# Patient Record
Sex: MALE | Race: WHITE | NOT HISPANIC OR LATINO | Employment: OTHER | URBAN - METROPOLITAN AREA
[De-identification: names, ages, dates, MRNs, and addresses within clinical notes are randomized per-mention and may not be internally consistent; named-entity substitution may affect disease eponyms.]

---

## 2017-01-01 ENCOUNTER — PATIENT MESSAGE (OUTPATIENT)
Dept: UROLOGY | Facility: CLINIC | Age: 72
End: 2017-01-01

## 2017-01-03 ENCOUNTER — HOSPITAL ENCOUNTER (INPATIENT)
Facility: HOSPITAL | Age: 72
LOS: 21 days | Discharge: HOME HEALTH | DRG: 559 | End: 2017-01-24
Attending: INTERNAL MEDICINE | Admitting: INTERNAL MEDICINE
Payer: MEDICARE

## 2017-01-03 DIAGNOSIS — M17.12 PRIMARY OSTEOARTHRITIS OF LEFT KNEE: ICD-10-CM

## 2017-01-03 DIAGNOSIS — F41.9 ANXIETY: ICD-10-CM

## 2017-01-03 DIAGNOSIS — L97.529 FOOT ULCER, LEFT, WITH UNSPECIFIED SEVERITY: ICD-10-CM

## 2017-01-03 DIAGNOSIS — I48.0 PAROXYSMAL ATRIAL FIBRILLATION: ICD-10-CM

## 2017-01-03 DIAGNOSIS — F41.1 GENERALIZED ANXIETY DISORDER: ICD-10-CM

## 2017-01-03 DIAGNOSIS — K21.9 GASTROESOPHAGEAL REFLUX DISEASE, ESOPHAGITIS PRESENCE NOT SPECIFIED: ICD-10-CM

## 2017-01-03 DIAGNOSIS — G47.33 OBSTRUCTIVE SLEEP APNEA ON CPAP: ICD-10-CM

## 2017-01-03 DIAGNOSIS — I25.10 CORONARY ARTERY DISEASE INVOLVING NATIVE CORONARY ARTERY OF NATIVE HEART WITHOUT ANGINA PECTORIS: ICD-10-CM

## 2017-01-03 DIAGNOSIS — S82.402F: ICD-10-CM

## 2017-01-03 DIAGNOSIS — C61 PROSTATE CANCER: ICD-10-CM

## 2017-01-03 DIAGNOSIS — E78.5 HYPERLIPIDEMIA, UNSPECIFIED HYPERLIPIDEMIA TYPE: ICD-10-CM

## 2017-01-03 DIAGNOSIS — H43.813 VITREOUS DETACHMENT, BILATERAL: ICD-10-CM

## 2017-01-03 DIAGNOSIS — L89.624 DECUBITUS ULCER OF LEFT HEEL, STAGE 4: ICD-10-CM

## 2017-01-03 DIAGNOSIS — Z98.890 STATUS POST SURGERY: ICD-10-CM

## 2017-01-03 DIAGNOSIS — S82.202N TRAUMATIC TYPE III OPEN FRACTURE OF SHAFT OF LEFT TIBIA AND FIBULA WITH NONUNION: Primary | ICD-10-CM

## 2017-01-03 DIAGNOSIS — F33.0 MAJOR DEPRESSIVE DISORDER, RECURRENT EPISODE, MILD: ICD-10-CM

## 2017-01-03 DIAGNOSIS — N13.8 BPH WITH URINARY OBSTRUCTION: ICD-10-CM

## 2017-01-03 DIAGNOSIS — S82.402N TRAUMATIC TYPE III OPEN FRACTURE OF SHAFT OF LEFT TIBIA AND FIBULA WITH NONUNION: Primary | ICD-10-CM

## 2017-01-03 DIAGNOSIS — N40.1 BPH WITH URINARY OBSTRUCTION: ICD-10-CM

## 2017-01-03 DIAGNOSIS — E55.9 VITAMIN D DEFICIENCY DISEASE: ICD-10-CM

## 2017-01-03 DIAGNOSIS — E66.01 MORBID OBESITY WITH BMI OF 40.0-44.9, ADULT: ICD-10-CM

## 2017-01-03 DIAGNOSIS — I10 ESSENTIAL HYPERTENSION: ICD-10-CM

## 2017-01-03 DIAGNOSIS — S82.202F: ICD-10-CM

## 2017-01-03 DIAGNOSIS — Z87.39 HISTORY OF GOUT: ICD-10-CM

## 2017-01-03 DIAGNOSIS — R19.7 DIARRHEA, UNSPECIFIED TYPE: ICD-10-CM

## 2017-01-03 PROCEDURE — 11000004 HC SNF PRIVATE

## 2017-01-03 PROCEDURE — 63600175 PHARM REV CODE 636 W HCPCS: Performed by: STUDENT IN AN ORGANIZED HEALTH CARE EDUCATION/TRAINING PROGRAM

## 2017-01-03 PROCEDURE — 25000003 PHARM REV CODE 250: Performed by: STUDENT IN AN ORGANIZED HEALTH CARE EDUCATION/TRAINING PROGRAM

## 2017-01-03 RX ORDER — VENLAFAXINE 37.5 MG/1
150 TABLET ORAL 2 TIMES DAILY
Status: DISCONTINUED | OUTPATIENT
Start: 2017-01-03 | End: 2017-01-25 | Stop reason: HOSPADM

## 2017-01-03 RX ORDER — ONDANSETRON 2 MG/ML
4 INJECTION INTRAMUSCULAR; INTRAVENOUS EVERY 12 HOURS PRN
Status: DISCONTINUED | OUTPATIENT
Start: 2017-01-03 | End: 2017-01-04

## 2017-01-03 RX ORDER — METOPROLOL TARTRATE 25 MG/1
25 TABLET, FILM COATED ORAL 2 TIMES DAILY
Status: DISCONTINUED | OUTPATIENT
Start: 2017-01-03 | End: 2017-01-25 | Stop reason: HOSPADM

## 2017-01-03 RX ORDER — AMOXICILLIN 250 MG
1 CAPSULE ORAL 2 TIMES DAILY
Status: DISCONTINUED | OUTPATIENT
Start: 2017-01-03 | End: 2017-01-04

## 2017-01-03 RX ORDER — ATORVASTATIN CALCIUM 20 MG/1
40 TABLET, FILM COATED ORAL DAILY
Status: DISCONTINUED | OUTPATIENT
Start: 2017-01-04 | End: 2017-01-25 | Stop reason: HOSPADM

## 2017-01-03 RX ORDER — SODIUM CHLORIDE 0.9 % (FLUSH) 0.9 %
10 SYRINGE (ML) INJECTION EVERY 6 HOURS
Status: DISCONTINUED | OUTPATIENT
Start: 2017-01-03 | End: 2017-01-25 | Stop reason: HOSPADM

## 2017-01-03 RX ORDER — SODIUM CHLORIDE 0.9 % (FLUSH) 0.9 %
10 SYRINGE (ML) INJECTION
Status: DISCONTINUED | OUTPATIENT
Start: 2017-01-03 | End: 2017-01-25 | Stop reason: HOSPADM

## 2017-01-03 RX ORDER — HYDROCODONE BITARTRATE AND ACETAMINOPHEN 5; 325 MG/1; MG/1
1 TABLET ORAL EVERY 4 HOURS PRN
Status: DISCONTINUED | OUTPATIENT
Start: 2017-01-03 | End: 2017-01-05

## 2017-01-03 RX ORDER — PANTOPRAZOLE SODIUM 40 MG/1
40 TABLET, DELAYED RELEASE ORAL DAILY
Status: DISCONTINUED | OUTPATIENT
Start: 2017-01-04 | End: 2017-01-25 | Stop reason: HOSPADM

## 2017-01-03 RX ORDER — RAMIPRIL 2.5 MG/1
5 CAPSULE ORAL DAILY
Status: DISCONTINUED | OUTPATIENT
Start: 2017-01-04 | End: 2017-01-16

## 2017-01-03 RX ORDER — GABAPENTIN 300 MG/1
300 CAPSULE ORAL 3 TIMES DAILY
Status: DISCONTINUED | OUTPATIENT
Start: 2017-01-03 | End: 2017-01-25 | Stop reason: HOSPADM

## 2017-01-03 RX ORDER — TAMSULOSIN HYDROCHLORIDE 0.4 MG/1
0.4 CAPSULE ORAL DAILY
Status: DISCONTINUED | OUTPATIENT
Start: 2017-01-04 | End: 2017-01-25 | Stop reason: HOSPADM

## 2017-01-03 RX ORDER — NITROGLYCERIN 0.4 MG/1
0.4 TABLET SUBLINGUAL EVERY 5 MIN PRN
Status: DISCONTINUED | OUTPATIENT
Start: 2017-01-03 | End: 2017-01-25 | Stop reason: HOSPADM

## 2017-01-03 RX ORDER — POLYETHYLENE GLYCOL 3350 17 G/17G
17 POWDER, FOR SOLUTION ORAL DAILY
Status: DISCONTINUED | OUTPATIENT
Start: 2017-01-04 | End: 2017-01-04

## 2017-01-03 RX ORDER — OXYCODONE AND ACETAMINOPHEN 10; 325 MG/1; MG/1
1 TABLET ORAL EVERY 4 HOURS PRN
Status: DISCONTINUED | OUTPATIENT
Start: 2017-01-03 | End: 2017-01-05

## 2017-01-03 RX ORDER — ASPIRIN 325 MG
325 TABLET ORAL 2 TIMES DAILY
Status: DISCONTINUED | OUTPATIENT
Start: 2017-01-03 | End: 2017-01-25 | Stop reason: HOSPADM

## 2017-01-03 RX ORDER — TORSEMIDE 20 MG/1
40 TABLET ORAL DAILY
Status: DISCONTINUED | OUTPATIENT
Start: 2017-01-04 | End: 2017-01-16

## 2017-01-03 RX ORDER — ACETAMINOPHEN 325 MG/1
650 TABLET ORAL EVERY 6 HOURS PRN
Status: DISCONTINUED | OUTPATIENT
Start: 2017-01-03 | End: 2017-01-25 | Stop reason: HOSPADM

## 2017-01-03 RX ADMIN — METOPROLOL TARTRATE 25 MG: 25 TABLET ORAL at 10:01

## 2017-01-03 RX ADMIN — PIPERACILLIN SODIUM,TAZOBACTAM SODIUM 4.5 G: 4; .5 INJECTION, POWDER, FOR SOLUTION INTRAVENOUS at 11:01

## 2017-01-03 RX ADMIN — Medication 10 ML: at 05:01

## 2017-01-03 RX ADMIN — VENLAFAXINE 150 MG: 37.5 TABLET ORAL at 10:01

## 2017-01-03 RX ADMIN — ASPIRIN 325 MG ORAL TABLET 325 MG: 325 PILL ORAL at 10:01

## 2017-01-03 RX ADMIN — OXYCODONE HYDROCHLORIDE AND ACETAMINOPHEN 1 TABLET: 10; 325 TABLET ORAL at 10:01

## 2017-01-03 RX ADMIN — GABAPENTIN 300 MG: 300 CAPSULE ORAL at 10:01

## 2017-01-03 NOTE — IP AVS SNAPSHOT
[facility]  [address]  [facility] [address]  Phone: [phone]           Patient Discharge Instructions     Our goal is to set you up for success. This packet includes information on your condition, medications, and your home care. It will help you to care for yourself so you don't get sicker and need to go back to the hospital.     Please ask your nurse if you have any questions.        There are many details to remember when preparing to leave the hospital. Here is what you will need to do:    1. Take your medicine. If you are prescribed medications, review your Medication List in the following pages. You may have new medications to  at the pharmacy and others that you'll need to stop taking. Review the instructions for how and when to take your medications. Talk with your doctor or nurses if you are unsure of what to do.     2. Go to your follow-up appointments. Specific follow-up information is listed in the following pages. Your may be contacted by a transition nurse or clinical provider about future appointments. Be sure we have all of the phone numbers to reach you, if needed. Please contact your provider's office if you are unable to make an appointment.     3. Watch for warning signs. Your doctor or nurse will give you detailed warning signs to watch for and when to call for assistance. These instructions may also include educational information about your condition. If you experience any of warning signs to your health, call your doctor.               [facility]  Unless otherwise directed by your provider, please contact [facility], our nurse care line that is available for 24/7 assistance.     [phone] (toll-free)    Registered nurses in the [facility] provide clinical advisement, health education, appointment booking, and other advisory services.                    ** Verify the list of medication(s) below is accurate and up  to date. Carry this with you in case of emergency. If your medications have changed, please notify your healthcare provider.             Medication List      START taking these medications        Additional Info                      artificial tears 0.5 % ophthalmic solution   Commonly known as:  ISOPTO TEARS   Refills:  0   Dose:  1 drop    Last time this was given:  1 drop on 1/11/2017  9:45 AM   Instructions:  Place 1 drop into both eyes as needed.     Begin Date    AM    Noon    PM    Bedtime       collagenase ointment   Quantity:  60 g   Refills:  0    Last time this was given:  1/23/2017  8:27 AM   Instructions:  Apply topically every Mon, Wed, Fri. Nursing to apply Santyl  nickel thick to wound bed and edges and cover with damp gauze (to activate santyl) daily and cover with telfa island dressing (mepore) over each pressure injury. Santyl has autolytic debridement properties and will promote healing.     Begin Date    AM    Noon    PM    Bedtime       diclofenac sodium 1 % Gel   Quantity:  1 Tube   Refills:  0   Dose:  2 g    Last time this was given:  2 g on 1/24/2017  8:21 AM   Instructions:  Apply 2 g topically once daily. Apply 2 g to left shoulder     Begin Date    AM    Noon    PM    Bedtime       lactobacillus acidophilus & bulgar 100 million cell packet   Commonly known as:  LACTINEX   Refills:  0   Dose:  1 tablet    Last time this was given:  1 each on 1/24/2017  8:14 AM   Instructions:  Take 1 packet (1 each total) by mouth 2 (two) times daily.     Begin Date    AM    Noon    PM    Bedtime       loperamide 2 mg capsule   Commonly known as:  IMODIUM   Quantity:  30 capsule   Refills:  0   Dose:  2 mg    Last time this was given:  2 mg on 1/23/2017  9:07 PM   Instructions:  Take 1 capsule (2 mg total) by mouth 4 (four) times daily as needed for Diarrhea.     Begin Date    AM    Noon    PM    Bedtime       PIPERACILLIN-TAZOBACTAM 4.5G/100ML D5W IVPB (READY TO MIX)   Quantity:  5100 mL   Refills:  0    Dose:  4.5 g   Indications:  Skin & soft tissue    Last time this was given:  4.5 g on 1/24/2017  5:55 AM   Instructions:  Inject 100 mLs (4.5 g total) into the vein every 8 (eight) hours.     Begin Date    AM    Noon    PM    Bedtime       polyethylene glycol 17 gram Pwpk   Commonly known as:  GLYCOLAX   Quantity:  30 packet   Refills:  0   Dose:  17 g    Last time this was given:  17 g on 1/19/2017 10:14 AM   Instructions:  Take 17 g by mouth once daily.     Begin Date    AM    Noon    PM    Bedtime       senna-docusate 8.6-50 mg 8.6-50 mg per tablet   Commonly known as:  PERICOLACE   Refills:  0   Dose:  1 tablet    Last time this was given:  1 tablet on 1/20/2017 10:43 PM   Instructions:  Take 1 tablet by mouth 2 (two) times daily.     Begin Date    AM    Noon    PM    Bedtime       teriparatide 20 mcg/dose - 600 mcg/2.4 mL Pnij   Commonly known as:  FORTEO   Quantity:  2.4 mL   Refills:  11   Dose:  20 mcg    Instructions:  Inject 0.08 mLs (20 mcg total) into the skin once daily. HOLD UNTIL SEEN BY YOUR PCP     Begin Date    AM    Noon    PM    Bedtime       zinc sulfate 220 (50) mg capsule   Commonly known as:  ZINCATE   Refills:  0   Dose:  220 mg    Last time this was given:  220 mg on 1/24/2017  8:14 AM   Instructions:  Take 1 capsule (220 mg total) by mouth once daily.     Begin Date    AM    Noon    PM    Bedtime         CHANGE how you take these medications        Additional Info                      acyclovir 800 MG Tab   Commonly known as:  ZOVIRAX   Quantity:  30 tablet   Refills:  11   Dose:  800 mg   What changed:    - medication strength  - additional instructions    Instructions:  Take 1 tablet (800 mg total) by mouth once daily. HOLD UNTIL SEEN BY YOUR PCP     Begin Date    AM    Noon    PM    Bedtime       oxycodone-acetaminophen  mg per tablet   Commonly known as:  PERCOCET   Quantity:  35 tablet   Refills:  0   Dose:  1 tablet   What changed:  reasons to take this    Last time this was  given:  1 tablet on 1/22/2017 10:57 PM   Instructions:  Take 1 tablet by mouth every 4 (four) hours as needed (pain).     Begin Date    AM    Noon    PM    Bedtime       torsemide 20 MG Tab   Commonly known as:  DEMADEX   Quantity:  180 tablet   Refills:  0   Dose:  40 mg   What changed:  additional instructions    Last time this was given:  40 mg on 1/24/2017  8:14 AM   Instructions:  Take 2 tablets (40 mg total) by mouth once daily. 1 tablet (20 mg) daily for 3 days.     Begin Date    AM    Noon    PM    Bedtime         CONTINUE taking these medications        Additional Info                      aspirin 325 MG tablet   Quantity:  60 tablet   Refills:  0   Dose:  325 mg    Last time this was given:  325 mg on 1/24/2017  8:14 AM   Instructions:  Take 1 tablet (325 mg total) by mouth 2 (two) times daily.     Begin Date    AM    Noon    PM    Bedtime       atorvastatin 40 MG tablet   Commonly known as:  LIPITOR   Quantity:  90 tablet   Refills:  0    Last time this was given:  40 mg on 1/24/2017  8:14 AM   Instructions:  TAKE ONE TABLET BY MOUTH ONCE DAILY     Begin Date    AM    Noon    PM    Bedtime       gabapentin 300 MG capsule   Commonly known as:  NEURONTIN   Quantity:  90 capsule   Refills:  11   Dose:  300 mg    Last time this was given:  300 mg on 1/24/2017  5:56 AM   Instructions:  Take 1 capsule (300 mg total) by mouth 3 (three) times daily.     Begin Date    AM    Noon    PM    Bedtime       melatonin 3 mg Tab   Refills:  0   Dose:  6 mg    Instructions:  Take 6 mg by mouth nightly as needed.     Begin Date    AM    Noon    PM    Bedtime       metoprolol tartrate 25 MG tablet   Commonly known as:  LOPRESSOR   Quantity:  180 tablet   Refills:  3   Dose:  25 mg    Last time this was given:  25 mg on 1/24/2017  8:14 AM   Instructions:  Take 1 tablet (25 mg total) by mouth 2 (two) times daily.     Begin Date    AM    Noon    PM    Bedtime       nitroGLYCERIN 0.4 MG SL tablet   Commonly known as:  NITROSTAT    Quantity:  100 tablet   Refills:  0   Dose:  0.4 mg    Instructions:  Place 1 tablet (0.4 mg total) under the tongue every 5 (five) minutes as needed for Chest pain.     Begin Date    AM    Noon    PM    Bedtime       omeprazole 20 MG capsule   Commonly known as:  PRILOSEC   Quantity:  180 capsule   Refills:  0   Dose:  40 mg    Instructions:  Take 2 capsules (40 mg total) by mouth once daily.     Begin Date    AM    Noon    PM    Bedtime       promethazine 12.5 MG Tab   Commonly known as:  PHENERGAN   Quantity:  30 tablet   Refills:  0   Dose:  12.5 mg    Instructions:  Take 1 tablet (12.5 mg total) by mouth every 6 (six) hours as needed.     Begin Date    AM    Noon    PM    Bedtime       ramipril 5 MG capsule   Commonly known as:  ALTACE   Quantity:  90 capsule   Refills:  0    Last time this was given:  5 mg on 1/24/2017  8:14 AM   Instructions:  TAKE ONE CAPSULE BY MOUTH ONCE DAILY     Begin Date    AM    Noon    PM    Bedtime       tamsulosin 0.4 mg Cp24   Commonly known as:  FLOMAX   Quantity:  90 capsule   Refills:  3   Dose:  0.4 mg    Last time this was given:  0.4 mg on 1/24/2017  8:14 AM   Instructions:  Take 1 capsule (0.4 mg total) by mouth once daily.     Begin Date    AM    Noon    PM    Bedtime       venlafaxine 75 MG tablet   Commonly known as:  EFFEXOR   Quantity:  360 tablet   Refills:  0   Dose:  150 mg    Last time this was given:  150 mg on 1/24/2017  8:14 AM   Instructions:  Take 2 tablets (150 mg total) by mouth 2 (two) times daily.     Begin Date    AM    Noon    PM    Bedtime       VITAMIN B-12 ORAL   Refills:  0   Dose:  2500 mcg    Instructions:  Take 2,500 mcg by mouth once daily.     Begin Date    AM    Noon    PM    Bedtime       vitamin D 1000 units Tab   Refills:  0   Dose:  1000 Units    Instructions:  Take 1 tablet (1,000 Units total) by mouth once daily.     Begin Date    AM    Noon    PM    Bedtime         STOP taking these medications     docusate sodium 100 MG capsule    Commonly known as:  COLACE       oxycodone 5 MG immediate release tablet   Commonly known as:  ROXICODONE            Where to Get Your Medications      These medications were sent to [id number] [place] - [address]  [address] [address]     Phone:  [phone]     diclofenac sodium 1 % Gel    loperamide 2 mg capsule    oxycodone-acetaminophen  mg per tablet    polyethylene glycol 17 gram Pwpk         You can get these medications from any pharmacy     Bring a paper prescription for each of these medications     aspirin 325 MG tablet    collagenase ointment    teriparatide 20 mcg/dose - 600 mcg/2.4 mL Pnij       You don't need a prescription for these medications     artificial tears 0.5 % ophthalmic solution    lactobacillus acidophilus & bulgar 100 million cell packet    senna-docusate 8.6-50 mg 8.6-50 mg per tablet    zinc sulfate 220 (50) mg capsule         Information about where to get these medications is not yet available     ! Ask your nurse or doctor about these medications     acyclovir 800 MG Tab    PIPERACILLIN-TAZOBACTAM 4.5G/100ML D5W IVPB (READY TO MIX)    torsemide 20 MG Tab                  Please bring to all follow up appointments:    1. A copy of your discharge instructions.  2. All medicines you are currently taking in their original bottles.  3. Identification and insurance card.    Please arrive 15 minutes ahead of scheduled appointment time.    Please call 24 hours in advance if you must reschedule your appointment and/or time.        Your Scheduled Appointments     Jan 31, 2017  9:00 AM CST   Post OP with MD Reji Maravilla - Orthopedics ([address] )    [address]  [facility] [address] [phone]            Feb 07, 2017  1:00 PM CST   Established Patient Visit with KAMAR Fierro - Infectious Diseases ([address] )    [address]  [facility] [address] [phone]               Follow-up Information     Follow up with Walter Cortés MD. Go on 1/31/2017.    Specialty:  Orthopedic Surgery    Why:  For wound re-check    Contact information:    [address] [facility] [id number] [phone]          Follow up with Benny Cannon PA-C. Go on 2/7/2017.    Specialty:  Infectious Diseases    Why:  Follow up    Contact information:    [address] [facility] [id number] [phone]          Follow up with Miguelina Weathers MD. Schedule an appointment as soon as possible for a visit in 2 weeks.    Specialties:  Internal Medicine, Pediatrics    Why:  Hospital follow up    Contact information:    [address] [facility] [address] [phone]          Follow up with [facility].    Specialties:  Pharmacist, DME Provider, IV Infusion    Why:  [facility] will provide services to pt when discharged.    Contact information:    [address]  [address]  [facility] [id number] [phone]          Follow up with [facility].    Specialty:  Home Health Services    Why:  [facility] will provide home health services to the pt when discharged.    Contact information:    [address]  [address]  [facility] [id number] [phone]          Discharge Instructions     Future Orders    Call MD for:  difficulty breathing or increased cough     Call MD for:  increased confusion or weakness     Call MD for:  persistent dizziness, light-headedness, or visual disturbances     Call MD for:  persistent nausea and vomiting or diarrhea     Call MD for:  redness, tenderness, or signs of infection (pain, swelling, redness, odor or green/yellow discharge around incision site)     Call MD for:  severe uncontrolled pain     Call MD for:  temperature >100.4     Diet general     Questions:    Total calories:      Fat restriction, if any:      Protein restriction, if any:      Na restriction, if any:      Fluid restriction:      Additional restrictions:          Primary  "Diagnosis     Your primary diagnosis was:  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      Admission Information     Date & Time Provider Department CSN    1/3/2017  5:22 PM Reuben Prasad MD [facility] [id number]      Care Providers     Provider Role Specialty Primary office phone    Reuben Prasad MD Attending Provider Hospitalist [phone]    [facility] Consulting Provider  Pharmacist  [phone]      Your Vitals Were     BP Pulse Temp Resp Height Weight    118/58 (BP Location: Left arm, Patient Position: Sitting, BP Method: Automatic) 76 97.5 °F (36.4 °C) (Oral) 18 6' 1" (1.854 m) 146.4 kg (322 lb 12.1 oz)    SpO2 BMI             95% 42.78 kg/m2         Recent Lab Values        7/13/2010 3/18/2011 2/10/2012 7/27/2012 11/18/2013 3/3/2014 3/31/2015 7/11/2016      8:35 AM  8:54 AM  8:56 AM  9:25 AM  3:45 PM 10:52 AM 10:35 AM 11:29 AM    A1C 5.3 5.6 5.5 5.4 5.5 5.6 5.3 5.3    Comment for A1C at 11:29 AM on 7/11/2016:  According to ADA guidelines, hemoglobin A1C <7.0% represents  optimal control in non-pregnant diabetic patients.  Different  metrics may apply to specific populations.   Standards of Medical Care in Diabetes - 2016.  For the purpose of screening for the presence of diabetes:  <5.7%     Consistent with the absence of diabetes  5.7-6.4%  Consistent with increasing risk for diabetes   (prediabetes)  >or=6.5%  Consistent with diabetes  Currently no consensus exists for use of hemoglobin A1C  for diagnosis of diabetes for children.        Allergies as of 1/24/2017        Reactions    Bacitracin Hives, Itching      Advance Directives     An advance directive is a document which, in the event you are no longer able to make decisions for yourself, tells your healthcare team what kind of treatment you do or do not want to receive, or who you would like to make those decisions for you.  If you do not currently have an advance directive, [facility] " encourages you to create one.  For more information call:  [phone]), [phone] ([phone]),  or log on to [email or web address].        Language Assistance Services     ATTENTION: Language assistance services are available, free of charge. Please call [phone].      ATENCIÓN: Si habla español, tiene a robison disposición servicios gratuitos de asistencia lingüística. Llame al [phone].     [facility] Ý: N?u b?n nói Ti?ng Vi?t, có các d?ch v? h? tr? ngôn ng? mi?n phí dành cho b?n. G?i s? [phone].         [facility] complies with applicable Federal civil rights laws and does not discriminate on the basis of race, color, national origin, age, disability, or sex.

## 2017-01-03 NOTE — IP AVS SNAPSHOT
[facility]  [address]  [facility] [address]  Phone: [phone]           Patient Discharge Instructions     Our goal is to set you up for success. This packet includes information on your condition, medications, and your home care. It will help you to care for yourself so you don't get sicker and need to go back to the hospital.     Please ask your nurse if you have any questions.        There are many details to remember when preparing to leave the hospital. Here is what you will need to do:    1. Take your medicine. If you are prescribed medications, review your Medication List in the following pages. You may have new medications to  at the pharmacy and others that you'll need to stop taking. Review the instructions for how and when to take your medications. Talk with your doctor or nurses if you are unsure of what to do.     2. Go to your follow-up appointments. Specific follow-up information is listed in the following pages. Your may be contacted by a transition nurse or clinical provider about future appointments. Be sure we have all of the phone numbers to reach you, if needed. Please contact your provider's office if you are unable to make an appointment.     3. Watch for warning signs. Your doctor or nurse will give you detailed warning signs to watch for and when to call for assistance. These instructions may also include educational information about your condition. If you experience any of warning signs to your health, call your doctor.               [facility]  Unless otherwise directed by your provider, please contact [facility], our nurse care line that is available for 24/7 assistance.     [phone] (toll-free)    Registered nurses in the [facility] provide clinical advisement, health education, appointment booking, and other advisory services.                    ** Verify the list of medication(s) below is accurate and up  to date. Carry this with you in case of emergency. If your medications have changed, please notify your healthcare provider.             Medication List      START taking these medications        Additional Info                      artificial tears 0.5 % ophthalmic solution   Commonly known as:  ISOPTO TEARS   Refills:  0   Dose:  1 drop    Last time this was given:  1 drop on 1/11/2017  9:45 AM   Instructions:  Place 1 drop into both eyes as needed.     Begin Date    AM    Noon    PM    Bedtime       collagenase ointment   Quantity:  60 g   Refills:  0    Last time this was given:  1/23/2017  8:27 AM   Instructions:  Apply topically every Mon, Wed, Fri. Nursing to apply Santyl  nickel thick to wound bed and edges and cover with damp gauze (to activate santyl) daily and cover with telfa island dressing (mepore) over each pressure injury. Santyl has autolytic debridement properties and will promote healing.     Begin Date    AM    Noon    PM    Bedtime       diclofenac sodium 1 % Gel   Quantity:  1 Tube   Refills:  0   Dose:  2 g    Last time this was given:  2 g on 1/24/2017  8:21 AM   Instructions:  Apply 2 g topically once daily. Apply 2 g to left shoulder     Begin Date    AM    Noon    PM    Bedtime       lactobacillus acidophilus & bulgar 100 million cell packet   Commonly known as:  LACTINEX   Refills:  0   Dose:  1 tablet    Last time this was given:  1 each on 1/24/2017  8:14 AM   Instructions:  Take 1 packet (1 each total) by mouth 2 (two) times daily.     Begin Date    AM    Noon    PM    Bedtime       loperamide 2 mg capsule   Commonly known as:  IMODIUM   Quantity:  30 capsule   Refills:  0   Dose:  2 mg    Last time this was given:  2 mg on 1/23/2017  9:07 PM   Instructions:  Take 1 capsule (2 mg total) by mouth 4 (four) times daily as needed for Diarrhea.     Begin Date    AM    Noon    PM    Bedtime       PIPERACILLIN-TAZOBACTAM 4.5G/100ML D5W IVPB (READY TO MIX)   Quantity:  5100 mL   Refills:  0    Dose:  4.5 g   Indications:  Skin & soft tissue    Last time this was given:  4.5 g on 1/24/2017  5:55 AM   Instructions:  Inject 100 mLs (4.5 g total) into the vein every 8 (eight) hours.     Begin Date    AM    Noon    PM    Bedtime       polyethylene glycol 17 gram Pwpk   Commonly known as:  GLYCOLAX   Quantity:  30 packet   Refills:  0   Dose:  17 g    Last time this was given:  17 g on 1/19/2017 10:14 AM   Instructions:  Take 17 g by mouth once daily.     Begin Date    AM    Noon    PM    Bedtime       senna-docusate 8.6-50 mg 8.6-50 mg per tablet   Commonly known as:  PERICOLACE   Refills:  0   Dose:  1 tablet    Last time this was given:  1 tablet on 1/20/2017 10:43 PM   Instructions:  Take 1 tablet by mouth 2 (two) times daily.     Begin Date    AM    Noon    PM    Bedtime       teriparatide 20 mcg/dose - 600 mcg/2.4 mL Pnij   Commonly known as:  FORTEO   Quantity:  2.4 mL   Refills:  11   Dose:  20 mcg    Instructions:  Inject 0.08 mLs (20 mcg total) into the skin once daily. HOLD UNTIL SEEN BY YOUR PCP     Begin Date    AM    Noon    PM    Bedtime       zinc sulfate 220 (50) mg capsule   Commonly known as:  ZINCATE   Refills:  0   Dose:  220 mg    Last time this was given:  220 mg on 1/24/2017  8:14 AM   Instructions:  Take 1 capsule (220 mg total) by mouth once daily.     Begin Date    AM    Noon    PM    Bedtime         CHANGE how you take these medications        Additional Info                      acyclovir 800 MG Tab   Commonly known as:  ZOVIRAX   Quantity:  30 tablet   Refills:  11   Dose:  800 mg   What changed:    - medication strength  - additional instructions    Instructions:  Take 1 tablet (800 mg total) by mouth once daily. HOLD UNTIL SEEN BY YOUR PCP     Begin Date    AM    Noon    PM    Bedtime       oxycodone-acetaminophen  mg per tablet   Commonly known as:  PERCOCET   Quantity:  35 tablet   Refills:  0   Dose:  1 tablet   What changed:  reasons to take this    Last time this was  given:  1 tablet on 1/22/2017 10:57 PM   Instructions:  Take 1 tablet by mouth every 4 (four) hours as needed (pain).     Begin Date    AM    Noon    PM    Bedtime       torsemide 20 MG Tab   Commonly known as:  DEMADEX   Quantity:  180 tablet   Refills:  0   Dose:  40 mg   What changed:  additional instructions    Last time this was given:  40 mg on 1/24/2017  8:14 AM   Instructions:  Take 2 tablets (40 mg total) by mouth once daily. 1 tablet (20 mg) daily for 3 days.     Begin Date    AM    Noon    PM    Bedtime         CONTINUE taking these medications        Additional Info                      aspirin 325 MG tablet   Quantity:  60 tablet   Refills:  0   Dose:  325 mg    Last time this was given:  325 mg on 1/24/2017  8:14 AM   Instructions:  Take 1 tablet (325 mg total) by mouth 2 (two) times daily.     Begin Date    AM    Noon    PM    Bedtime       atorvastatin 40 MG tablet   Commonly known as:  LIPITOR   Quantity:  90 tablet   Refills:  0    Last time this was given:  40 mg on 1/24/2017  8:14 AM   Instructions:  TAKE ONE TABLET BY MOUTH ONCE DAILY     Begin Date    AM    Noon    PM    Bedtime       gabapentin 300 MG capsule   Commonly known as:  NEURONTIN   Quantity:  90 capsule   Refills:  11   Dose:  300 mg    Last time this was given:  300 mg on 1/24/2017  5:56 AM   Instructions:  Take 1 capsule (300 mg total) by mouth 3 (three) times daily.     Begin Date    AM    Noon    PM    Bedtime       melatonin 3 mg Tab   Refills:  0   Dose:  6 mg    Instructions:  Take 6 mg by mouth nightly as needed.     Begin Date    AM    Noon    PM    Bedtime       metoprolol tartrate 25 MG tablet   Commonly known as:  LOPRESSOR   Quantity:  180 tablet   Refills:  3   Dose:  25 mg    Last time this was given:  25 mg on 1/24/2017  8:14 AM   Instructions:  Take 1 tablet (25 mg total) by mouth 2 (two) times daily.     Begin Date    AM    Noon    PM    Bedtime       nitroGLYCERIN 0.4 MG SL tablet   Commonly known as:  NITROSTAT    Quantity:  100 tablet   Refills:  0   Dose:  0.4 mg    Instructions:  Place 1 tablet (0.4 mg total) under the tongue every 5 (five) minutes as needed for Chest pain.     Begin Date    AM    Noon    PM    Bedtime       omeprazole 20 MG capsule   Commonly known as:  PRILOSEC   Quantity:  180 capsule   Refills:  0   Dose:  40 mg    Instructions:  Take 2 capsules (40 mg total) by mouth once daily.     Begin Date    AM    Noon    PM    Bedtime       promethazine 12.5 MG Tab   Commonly known as:  PHENERGAN   Quantity:  30 tablet   Refills:  0   Dose:  12.5 mg    Instructions:  Take 1 tablet (12.5 mg total) by mouth every 6 (six) hours as needed.     Begin Date    AM    Noon    PM    Bedtime       ramipril 5 MG capsule   Commonly known as:  ALTACE   Quantity:  90 capsule   Refills:  0    Last time this was given:  5 mg on 1/24/2017  8:14 AM   Instructions:  TAKE ONE CAPSULE BY MOUTH ONCE DAILY     Begin Date    AM    Noon    PM    Bedtime       tamsulosin 0.4 mg Cp24   Commonly known as:  FLOMAX   Quantity:  90 capsule   Refills:  3   Dose:  0.4 mg    Last time this was given:  0.4 mg on 1/24/2017  8:14 AM   Instructions:  Take 1 capsule (0.4 mg total) by mouth once daily.     Begin Date    AM    Noon    PM    Bedtime       venlafaxine 75 MG tablet   Commonly known as:  EFFEXOR   Quantity:  360 tablet   Refills:  0   Dose:  150 mg    Last time this was given:  150 mg on 1/24/2017  8:14 AM   Instructions:  Take 2 tablets (150 mg total) by mouth 2 (two) times daily.     Begin Date    AM    Noon    PM    Bedtime       VITAMIN B-12 ORAL   Refills:  0   Dose:  2500 mcg    Instructions:  Take 2,500 mcg by mouth once daily.     Begin Date    AM    Noon    PM    Bedtime       vitamin D 1000 units Tab   Refills:  0   Dose:  1000 Units    Instructions:  Take 1 tablet (1,000 Units total) by mouth once daily.     Begin Date    AM    Noon    PM    Bedtime         STOP taking these medications     docusate sodium 100 MG capsule    Commonly known as:  COLACE       oxycodone 5 MG immediate release tablet   Commonly known as:  ROXICODONE            Where to Get Your Medications      These medications were sent to [facility] - [place] - [address] [address]     Phone:  [phone]     diclofenac sodium 1 % Gel    loperamide 2 mg capsule    oxycodone-acetaminophen  mg per tablet    polyethylene glycol 17 gram Pwpk         You can get these medications from any pharmacy     Bring a paper prescription for each of these medications     aspirin 325 MG tablet    collagenase ointment    teriparatide 20 mcg/dose - 600 mcg/2.4 mL Pnij       You don't need a prescription for these medications     artificial tears 0.5 % ophthalmic solution    lactobacillus acidophilus & bulgar 100 million cell packet    senna-docusate 8.6-50 mg 8.6-50 mg per tablet    zinc sulfate 220 (50) mg capsule         Information about where to get these medications is not yet available     ! Ask your nurse or doctor about these medications     acyclovir 800 MG Tab    PIPERACILLIN-TAZOBACTAM 4.5G/100ML D5W IVPB (READY TO MIX)    torsemide 20 MG Tab                  Please bring to all follow up appointments:    1. A copy of your discharge instructions.  2. All medicines you are currently taking in their original bottles.  3. Identification and insurance card.    Please arrive 15 minutes ahead of scheduled appointment time.    Please call 24 hours in advance if you must reschedule your appointment and/or time.        Your Scheduled Appointments     Jan 31, 2017  9:00 AM CST   Post OP with MD Reji Maravilla - Orthopedics ([address] )    [address]  [facility] [address] [phone]            Feb 07, 2017  1:00 PM CST   Established Patient Visit with KAMAR Fierro - Infectious Diseases ([address] )    [address]  [facility] [address] [phone]               Follow-up Information     Follow up with Walter Cortés MD. Go on 1/31/2017.    Specialty:  Orthopedic Surgery    Why:  For wound re-check    Contact information:    [address] [facility] [id number] [phone]          Follow up with Benny Cannon PA-C. Go on 2/7/2017.    Specialty:  Infectious Diseases    Why:  Follow up    Contact information:    [address] [facility] [id number] [phone]          Follow up with Miguelina Weathers MD. Schedule an appointment as soon as possible for a visit in 2 weeks.    Specialties:  Internal Medicine, Pediatrics    Why:  Hospital follow up    Contact information:    [address] [facility] [address] [phone]          Follow up with [facility].    Specialties:  Pharmacist, DME Provider, IV Infusion    Why:  [place] will provide services to pt when discharged.    Contact information:    [address]  [address]  [facility] [id number] [phone]          Follow up with [facility].    Specialty:  Home Health Services    Why:  [facility] will provide home health services to the pt when discharged.    Contact information:    [address]  [address]  [facility] [id number] [phone]          Discharge Instructions     Future Orders    Call MD for:  difficulty breathing or increased cough     Call MD for:  increased confusion or weakness     Call MD for:  persistent dizziness, light-headedness, or visual disturbances     Call MD for:  persistent nausea and vomiting or diarrhea     Call MD for:  redness, tenderness, or signs of infection (pain, swelling, redness, odor or green/yellow discharge around incision site)     Call MD for:  severe uncontrolled pain     Call MD for:  temperature >100.4     Diet general     Questions:    Total calories:      Fat restriction, if any:      Protein restriction, if any:      Na restriction, if any:      Fluid restriction:      Additional restrictions:          Primary  "Diagnosis     Your primary diagnosis was:  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      Admission Information     Date & Time Provider Department CSN    1/3/2017  5:22 PM Reuben Prasad MD [facility] [id number]       Admisson Diagnosis: Status post surgery      Care Providers     Provider Role Specialty Primary office phone    Reuben Prasad MD Attending Provider Hospitalist [phone]    [facility] Consulting Provider  Pharmacist  [phone]      Your Vitals Were     BP Pulse Temp Resp Height Weight    118/58 (BP Location: Left arm, Patient Position: Sitting, BP Method: Automatic) 76 97.5 °F (36.4 °C) (Oral) 18 6' 1" (1.854 m) 146.4 kg (322 lb 12.1 oz)    SpO2 BMI             95% 42.78 kg/m2         Recent Lab Values        7/13/2010 3/18/2011 2/10/2012 7/27/2012 11/18/2013 3/3/2014 3/31/2015 7/11/2016      8:35 AM  8:54 AM  8:56 AM  9:25 AM  3:45 PM 10:52 AM 10:35 AM 11:29 AM    A1C 5.3 5.6 5.5 5.4 5.5 5.6 5.3 5.3    Comment for A1C at 11:29 AM on 7/11/2016:  According to ADA guidelines, hemoglobin A1C <7.0% represents  optimal control in non-pregnant diabetic patients.  Different  metrics may apply to specific populations.   Standards of Medical Care in Diabetes - 2016.  For the purpose of screening for the presence of diabetes:  <5.7%     Consistent with the absence of diabetes  5.7-6.4%  Consistent with increasing risk for diabetes   (prediabetes)  >or=6.5%  Consistent with diabetes  Currently no consensus exists for use of hemoglobin A1C  for diagnosis of diabetes for children.        Allergies as of 1/24/2017        Reactions    Bacitracin Hives, Itching      Advance Directives     An advance directive is a document which, in the event you are no longer able to make decisions for yourself, tells your healthcare team what kind of treatment you do or do not want to receive, or who you would like to make those decisions for you.  If you do not " currently have an advance directive, [facility] encourages you to create one.  For more information call:  [phone]), [phone] ([phone]), or log on to [email or web address].        Translation Services Information     ATTENTION: Language assistance services are available, free of charge. Please call [phone].    ATENCIÓN: Si habla español, tiene a robison disposición servicios gratuitos de asistencia lingüística. Llame al [phone].     CHÚ Ý: N?u b?n nói Ti?ng Vi?t, có các d?ch v? h? tr? ngôn ng? mi?n phí dành cho b?n. G?i s? [phone].         [facility] complies with applicable Federal civil rights laws and does not discriminate on the basis of race, color, national origin, age, disability, or sex.

## 2017-01-04 ENCOUNTER — PATIENT OUTREACH (OUTPATIENT)
Dept: ADMINISTRATIVE | Facility: CLINIC | Age: 72
End: 2017-01-04
Payer: MEDICARE

## 2017-01-04 LAB
ALBUMIN SERPL BCP-MCNC: 2.8 G/DL
ALP SERPL-CCNC: 127 U/L
ALT SERPL W/O P-5'-P-CCNC: 16 U/L
ANION GAP SERPL CALC-SCNC: 7 MMOL/L
AST SERPL-CCNC: 14 U/L
BILIRUB SERPL-MCNC: 0.7 MG/DL
BUN SERPL-MCNC: 20 MG/DL
CALCIUM SERPL-MCNC: 8.8 MG/DL
CHLORIDE SERPL-SCNC: 101 MMOL/L
CO2 SERPL-SCNC: 29 MMOL/L
CREAT SERPL-MCNC: 1.1 MG/DL
CRP SERPL-MCNC: 56.4 MG/L
ERYTHROCYTE [SEDIMENTATION RATE] IN BLOOD BY WESTERGREN METHOD: 100 MM/HR
EST. GFR  (AFRICAN AMERICAN): >60 ML/MIN/1.73 M^2
EST. GFR  (NON AFRICAN AMERICAN): >60 ML/MIN/1.73 M^2
GLUCOSE SERPL-MCNC: 82 MG/DL
POTASSIUM SERPL-SCNC: 3.8 MMOL/L
PROT SERPL-MCNC: 6.2 G/DL
SODIUM SERPL-SCNC: 137 MMOL/L

## 2017-01-04 PROCEDURE — 36415 COLL VENOUS BLD VENIPUNCTURE: CPT

## 2017-01-04 PROCEDURE — 97530 THERAPEUTIC ACTIVITIES: CPT

## 2017-01-04 PROCEDURE — 11000004 HC SNF PRIVATE

## 2017-01-04 PROCEDURE — 63600175 PHARM REV CODE 636 W HCPCS: Performed by: NURSE PRACTITIONER

## 2017-01-04 PROCEDURE — 25000003 PHARM REV CODE 250: Performed by: STUDENT IN AN ORGANIZED HEALTH CARE EDUCATION/TRAINING PROGRAM

## 2017-01-04 PROCEDURE — 97161 PT EVAL LOW COMPLEX 20 MIN: CPT

## 2017-01-04 PROCEDURE — 85651 RBC SED RATE NONAUTOMATED: CPT

## 2017-01-04 PROCEDURE — 97535 SELF CARE MNGMENT TRAINING: CPT

## 2017-01-04 PROCEDURE — 80053 COMPREHEN METABOLIC PANEL: CPT

## 2017-01-04 PROCEDURE — 97110 THERAPEUTIC EXERCISES: CPT

## 2017-01-04 PROCEDURE — 86140 C-REACTIVE PROTEIN: CPT

## 2017-01-04 PROCEDURE — 97166 OT EVAL MOD COMPLEX 45 MIN: CPT

## 2017-01-04 PROCEDURE — 99306 1ST NF CARE HIGH MDM 50: CPT | Mod: ,,, | Performed by: INTERNAL MEDICINE

## 2017-01-04 RX ORDER — AMOXICILLIN 250 MG
1 CAPSULE ORAL 2 TIMES DAILY
Status: DISCONTINUED | OUTPATIENT
Start: 2017-01-06 | End: 2017-01-25 | Stop reason: HOSPADM

## 2017-01-04 RX ORDER — POLYETHYLENE GLYCOL 3350 17 G/17G
17 POWDER, FOR SOLUTION ORAL DAILY
Status: DISCONTINUED | OUTPATIENT
Start: 2017-01-06 | End: 2017-01-25 | Stop reason: HOSPADM

## 2017-01-04 RX ORDER — ONDANSETRON 8 MG/1
8 TABLET, ORALLY DISINTEGRATING ORAL EVERY 8 HOURS PRN
Status: DISCONTINUED | OUTPATIENT
Start: 2017-01-04 | End: 2017-01-25 | Stop reason: HOSPADM

## 2017-01-04 RX ADMIN — VENLAFAXINE 150 MG: 37.5 TABLET ORAL at 08:01

## 2017-01-04 RX ADMIN — GABAPENTIN 300 MG: 300 CAPSULE ORAL at 02:01

## 2017-01-04 RX ADMIN — Medication 10 ML: at 02:01

## 2017-01-04 RX ADMIN — VENLAFAXINE 150 MG: 37.5 TABLET ORAL at 09:01

## 2017-01-04 RX ADMIN — HYDROCODONE BITARTRATE AND ACETAMINOPHEN 1 TABLET: 5; 325 TABLET ORAL at 10:01

## 2017-01-04 RX ADMIN — ASPIRIN 325 MG ORAL TABLET 325 MG: 325 PILL ORAL at 09:01

## 2017-01-04 RX ADMIN — ASPIRIN 325 MG ORAL TABLET 325 MG: 325 PILL ORAL at 08:01

## 2017-01-04 RX ADMIN — Medication 10 ML: at 06:01

## 2017-01-04 RX ADMIN — METOPROLOL TARTRATE 25 MG: 25 TABLET ORAL at 09:01

## 2017-01-04 RX ADMIN — PIPERACILLIN SODIUM,TAZOBACTAM SODIUM 4.5 G: 4; .5 INJECTION, POWDER, FOR SOLUTION INTRAVENOUS at 03:01

## 2017-01-04 RX ADMIN — HYDROCODONE BITARTRATE AND ACETAMINOPHEN 1 TABLET: 5; 325 TABLET ORAL at 09:01

## 2017-01-04 RX ADMIN — TORSEMIDE 40 MG: 20 TABLET ORAL at 08:01

## 2017-01-04 RX ADMIN — Medication 10 ML: at 12:01

## 2017-01-04 RX ADMIN — PIPERACILLIN SODIUM,TAZOBACTAM SODIUM 4.5 G: 4; .5 INJECTION, POWDER, FOR SOLUTION INTRAVENOUS at 06:01

## 2017-01-04 RX ADMIN — GABAPENTIN 300 MG: 300 CAPSULE ORAL at 06:01

## 2017-01-04 RX ADMIN — GABAPENTIN 300 MG: 300 CAPSULE ORAL at 09:01

## 2017-01-04 RX ADMIN — SODIUM CHLORIDE, PRESERVATIVE FREE 10 ML: 5 INJECTION INTRAVENOUS at 03:01

## 2017-01-04 RX ADMIN — SODIUM CHLORIDE, PRESERVATIVE FREE 10 ML: 5 INJECTION INTRAVENOUS at 08:01

## 2017-01-04 RX ADMIN — ALTEPLASE 2 MG: 2.2 INJECTION, POWDER, LYOPHILIZED, FOR SOLUTION INTRAVENOUS at 12:01

## 2017-01-04 RX ADMIN — OXYCODONE HYDROCHLORIDE AND ACETAMINOPHEN 1 TABLET: 10; 325 TABLET ORAL at 06:01

## 2017-01-04 RX ADMIN — TAMSULOSIN HYDROCHLORIDE 0.4 MG: 0.4 CAPSULE ORAL at 08:01

## 2017-01-04 RX ADMIN — ATORVASTATIN CALCIUM 40 MG: 20 TABLET, FILM COATED ORAL at 08:01

## 2017-01-04 RX ADMIN — PANTOPRAZOLE SODIUM 40 MG: 40 TABLET, DELAYED RELEASE ORAL at 08:01

## 2017-01-04 RX ADMIN — METOPROLOL TARTRATE 25 MG: 25 TABLET ORAL at 08:01

## 2017-01-04 RX ADMIN — HYDROCODONE BITARTRATE AND ACETAMINOPHEN 1 TABLET: 5; 325 TABLET ORAL at 03:01
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
## 2017-01-04 NOTE — PLAN OF CARE
Problem: Fall Risk (Adult)  Goal: Absence of Falls  Patient will demonstrate the desired outcomes by discharge/transition of care.   Outcome: Ongoing (interventions implemented as appropriate)  Pt. Had no falls at this time.will continue to monitor pt.

## 2017-01-04 NOTE — PLAN OF CARE
Problem: Occupational Therapy Goal  Goal: Occupational Therapy Goal  Goals to be met by: 14 days     Patient will increase functional independence with ADLs by performing:    LE Dressing with Set-up Assistance and Assistive Devices as needed.  Toileting from bedside commode with Minimal Assistance for hygiene and clothing management.   Bathing from edge of bed with Minimal Assistance.  Stand pivot transfers with Modified [place].  Toilet transfer to bedside commode with Modified [place].  Outcome: Ongoing (interventions implemented as appropriate)  Goals set. NIK Munson  1/4/2017
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
## 2017-01-04 NOTE — PROGRESS NOTES
"Ortho  Progress Note    Janusz Montgomery Jr. is a 71 y.o. male admitted to [facility]  1/3/2017      Chief Complaint/Reason for admission:  S/p placement of spanning external fixation of left tibial fracture nonunion, closed treatment of left tibial fracture nonunion with manipulation under anesthesia, and excisional debridement of left heel ulcer 3 x 3 cm.performed by Dr. Cortés on 12/28/2016.      The patient was seen and examined this morning at the bedside. Patient reports no acute issues .  Patient reports that pain is adequately controlled.    _______________    Vitals:    01/03/17 1742 01/03/17 2220 01/04/17 0838   BP: (!) 122/58 (!) 124/56 129/82   Pulse: 73 74 85   Resp: 16 18 18   Temp: 97.6 °F (36.4 °C) 97.7 °F (36.5 °C) 97.9 °F (36.6 °C)   TempSrc: Oral Oral Oral   SpO2: 95% 95% (!) 85%   Weight: (!) 156.1 kg (344 lb 2.2 oz)     Height: 6' 1" (1.854 m)         Vital Signs (Most Recent)  Temp: 97.9 °F (36.6 °C) (01/04/17 0838)  Pulse: 85 (01/04/17 0838)  Resp: 18 (01/04/17 0838)  BP: 129/82 (01/04/17 0838)  SpO2: (!) 85 % (01/04/17 0838)    Vital Signs Range (Last 24H):  Temp:  [96.3 °F (35.7 °C)-97.9 °F (36.6 °C)]   Pulse:  [64-85]   Resp:  [16-18]   BP: (117-129)/(55-82)   SpO2:  [85 %-95 %]     Physical Exam:  Awake/alert/oriented x3, No acute distress, Afebrile, Vital signs stable   Normocephalic, Atraumatic   No gross deficit of cranial nerves   Palpable distal pulses   Good inspiratory effort with unlabored breathing   Dressings clean/dry/intact, Operative limb neurovascularly intact   Incision: ex fix pin sites clean and intact with no erythema or drainage. Dressing in place to heel- wound care consult pending for wound care plan    Recent Labs      01/02/17   0530  01/03/17   0417  01/04/17   0632   K  4.0  3.9  3.8   CALCIUM  9.3  9.1  8.8   WBC  8.20  9.16   --    HGB  10.6*  10.5*   --    HCT  31.6*  31.4*   --    PLT  171  178   --        I/O last 3 completed shifts:  In: -   Out: 1175 " [Urine:1175]    Assessment:   10 days s/p placement of spanning external fixation of left tibial fracture nonunion, closed treatment of left tibial fracture nonunion with manipulation under anesthesia, and excisional debridement of left heel ulcer 3 x 3 cm.performed     Plan:  PT/OT  Pain Control  DVT Prophylaxis  Follow up in clinic with Dr. Cortés 1/31/17 as scheduled    Sabrina Casey NP Orthopedic Surgery

## 2017-01-04 NOTE — PLAN OF CARE
Problem: Physical Therapy Goal  Goal: Physical Therapy Goal  Goals to be met by: 2 weeks (tentative, pending medical progress)     Patient will increase functional independence with mobility by performin. Supine to sit with Modified [place]  2. Sit to supine with Modified [place]  3. Sit to stand transfer with Supervision and maintaining NWB LLE  4. Bed to chair transfer with Supervision using Rolling Walker and maintaining NWB LLE  5. Gait x 25 feet with Stand-by Assistance using Rolling Walker. and maintaining NWB LLE    6. Wheelchair propulsion x150 feet with Modified [place] using bilateral upper extremities  7. Ascend/Descend 2 inch curb step with Contact Guard Assistance using Rolling Walker. and maintaining NWB LLE  8. Lower extremity exercise program x20-30 reps per handout, with assistance as needed and gym therex  PT jatinder comleted. [place] Andrea, PT 2017
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
## 2017-01-04 NOTE — PROGRESS NOTES
The left heel was cleaned with normal saline/gauze, slough noted to wound bed ~ 25% with dark area noted to wound bed and adriana-wound, callused wound edges with ~25% maceration noted. .  Skin prep applied to adriana-wound, medi-honey applied to wound bed, covered with melgisorb Ag then covered with mepore dressing.  Spoke to Mike Hendrix -NP- approved recommendation of santyl to wound bed daily to resolve slough and callus formation       01/04/17 1500       Pressure Ulcer 12/28/16 Left heel Stage IV   Date First Assessed: 12/28/16   Pressure Ulcer Present on Admission: (c) yes  Side: Left  Location: heel  Staging: Stage IV   Staging Stage IV   Healing Pressure Ulcer no   Pressure Ulcer Risk Factors shear/friction;nutrition;mobility   Dressing Appearance intact;moist drainage   Drainage Amount small   Drainage Characteristics/Odor creamy;serosanguineous   Appearance slough;moist;reddened   Periwound Area dry   Wound Edges callused   Wound Length (cm) 4   Wound Width (cm) 3   Depth (cm) 0.7   Cleansed W/ sterile normal saline   Interventions barrier applied   Dressing Dressing changed;Ag dressings;honey dressing;telfa island dressing   Dressing Change Due 01/05/17     Consent was received from the patient for the wound photograph.

## 2017-01-05 LAB
C DIFF GDH STL QL: NEGATIVE
C DIFF TOX A+B STL QL IA: NEGATIVE

## 2017-01-05 PROCEDURE — 99309 SBSQ NF CARE MODERATE MDM 30: CPT | Mod: ,,, | Performed by: NURSE PRACTITIONER

## 2017-01-05 PROCEDURE — 97530 THERAPEUTIC ACTIVITIES: CPT

## 2017-01-05 PROCEDURE — 97110 THERAPEUTIC EXERCISES: CPT

## 2017-01-05 PROCEDURE — 87449 NOS EACH ORGANISM AG IA: CPT

## 2017-01-05 PROCEDURE — 25000003 PHARM REV CODE 250: Performed by: NURSE PRACTITIONER

## 2017-01-05 PROCEDURE — 97116 GAIT TRAINING THERAPY: CPT

## 2017-01-05 PROCEDURE — 25000003 PHARM REV CODE 250: Performed by: STUDENT IN AN ORGANIZED HEALTH CARE EDUCATION/TRAINING PROGRAM

## 2017-01-05 PROCEDURE — 11000004 HC SNF PRIVATE

## 2017-01-05 RX ORDER — L. ACIDOPHILUS/L.BULGARICUS 100MM CELL
1 GRANULES IN PACKET (EA) ORAL 2 TIMES DAILY
Status: DISCONTINUED | OUTPATIENT
Start: 2017-01-05 | End: 2017-01-25 | Stop reason: HOSPADM

## 2017-01-05 RX ORDER — DICLOFENAC SODIUM 10 MG/G
2 GEL TOPICAL DAILY
Status: DISCONTINUED | OUTPATIENT
Start: 2017-01-05 | End: 2017-01-25 | Stop reason: HOSPADM

## 2017-01-05 RX ORDER — LOPERAMIDE HYDROCHLORIDE 2 MG/1
2 CAPSULE ORAL 4 TIMES DAILY PRN
Status: DISCONTINUED | OUTPATIENT
Start: 2017-01-05 | End: 2017-01-25 | Stop reason: HOSPADM

## 2017-01-05 RX ORDER — OXYCODONE AND ACETAMINOPHEN 10; 325 MG/1; MG/1
1 TABLET ORAL EVERY 4 HOURS PRN
Status: DISCONTINUED | OUTPATIENT
Start: 2017-01-05 | End: 2017-01-25 | Stop reason: HOSPADM

## 2017-01-05 RX ADMIN — ASPIRIN 325 MG ORAL TABLET 325 MG: 325 PILL ORAL at 10:01

## 2017-01-05 RX ADMIN — OXYCODONE HYDROCHLORIDE AND ACETAMINOPHEN 1 TABLET: 10; 325 TABLET ORAL at 11:01

## 2017-01-05 RX ADMIN — VENLAFAXINE 150 MG: 37.5 TABLET ORAL at 09:01

## 2017-01-05 RX ADMIN — GABAPENTIN 300 MG: 300 CAPSULE ORAL at 10:01

## 2017-01-05 RX ADMIN — Medication 10 ML: at 12:01

## 2017-01-05 RX ADMIN — Medication 10 ML: at 05:01

## 2017-01-05 RX ADMIN — COLLAGENASE SANTYL: 250 OINTMENT TOPICAL at 09:01

## 2017-01-05 RX ADMIN — ASPIRIN 325 MG ORAL TABLET 325 MG: 325 PILL ORAL at 09:01

## 2017-01-05 RX ADMIN — PIPERACILLIN SODIUM,TAZOBACTAM SODIUM 4.5 G: 4; .5 INJECTION, POWDER, FOR SOLUTION INTRAVENOUS at 12:01

## 2017-01-05 RX ADMIN — GABAPENTIN 300 MG: 300 CAPSULE ORAL at 01:01

## 2017-01-05 RX ADMIN — METOPROLOL TARTRATE 25 MG: 25 TABLET ORAL at 09:01

## 2017-01-05 RX ADMIN — PIPERACILLIN SODIUM,TAZOBACTAM SODIUM 4.5 G: 4; .5 INJECTION, POWDER, FOR SOLUTION INTRAVENOUS at 08:01

## 2017-01-05 RX ADMIN — OXYCODONE HYDROCHLORIDE AND ACETAMINOPHEN 1 TABLET: 10; 325 TABLET ORAL at 05:01

## 2017-01-05 RX ADMIN — SODIUM CHLORIDE, PRESERVATIVE FREE 10 ML: 5 INJECTION INTRAVENOUS at 09:01

## 2017-01-05 RX ADMIN — METOPROLOL TARTRATE 25 MG: 25 TABLET ORAL at 10:01

## 2017-01-05 RX ADMIN — ATORVASTATIN CALCIUM 40 MG: 20 TABLET, FILM COATED ORAL at 09:01

## 2017-01-05 RX ADMIN — RAMIPRIL 5 MG: 2.5 CAPSULE ORAL at 09:01

## 2017-01-05 RX ADMIN — OXYCODONE HYDROCHLORIDE AND ACETAMINOPHEN 1 TABLET: 10; 325 TABLET ORAL at 10:01

## 2017-01-05 RX ADMIN — DICLOFENAC 2 G: 10 GEL TOPICAL at 12:01

## 2017-01-05 RX ADMIN — TAMSULOSIN HYDROCHLORIDE 0.4 MG: 0.4 CAPSULE ORAL at 09:01

## 2017-01-05 RX ADMIN — PANTOPRAZOLE SODIUM 40 MG: 40 TABLET, DELAYED RELEASE ORAL at 09:01

## 2017-01-05 RX ADMIN — LACTOBACILLUS ACIDOPHILUS / LACTOBACILLUS BULGARICUS 1 EACH: 100 MILLION CFU STRENGTH GRANULES at 10:01

## 2017-01-05 RX ADMIN — VENLAFAXINE 150 MG: 37.5 TABLET ORAL at 10:01

## 2017-01-05 RX ADMIN — LOPERAMIDE HYDROCHLORIDE 2 MG: 2 CAPSULE ORAL at 10:01

## 2017-01-05 RX ADMIN — GABAPENTIN 300 MG: 300 CAPSULE ORAL at 05:01

## 2017-01-05 RX ADMIN — Medication 10 ML: at 06:01

## 2017-01-05 RX ADMIN — TORSEMIDE 40 MG: 20 TABLET ORAL at 09:01

## 2017-01-05 NOTE — PROGRESS NOTES
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  He reports having 2 bouts per day.  In addition, he states pain medication 5 mg is not working but the 10 mg is working.  He also reports having shoulder pain especially when walking with his walker.     Pain Scale: 5    Review of Systems:  Constitutional: no fever or chills  Respiratory: no cough or shortness of breath  Cardiovascular: no chest pain or palpitations  Gastrointestinal: positive for diarrhea  Genitourinary: no hematuria or dysuria  Integument/Breast: no rash or pruritis, positive for skin ulcer to left heel  Musculoskeletal: positive for arthralgias and muscle weakness    OBJECTIVE:       Vital Signs Range (Last 24H):  Temp:  [97.9 °F (36.6 °C)-98.4 °F (36.9 °C)]   Pulse:  [74-75]   Resp:  [20]   BP: (120-131)/(60-78)   SpO2:  [95 %-96 %]     I & O (Last 24H):  No intake or output data in the 24 hours ending 01/05/17 0950    Physical Exam:  General: well developed, well nourished, appears stated age, no distress  Lungs:  clear to auscultation bilaterally and normal respiratory effort  Cardiovascular: Heart: regular rate and rhythm, S1, S2 normal, no murmur, click, rub or gallop. Chest Wall: no tenderness. Extremities: no cyanosis or edema, or clubbing. Pulses: 2+ and symmetric.  Abdomen/Rectal: Abdomen: soft, non-tender non-distented; bowel sounds normal; no masses,  no organomegaly. Rectal: not examined  Skin: Stage III left heel ulcer present.  External fixator to left lower leg, there is no redness or drainage present.  Musculoskeletal:no clubbing, cyanosis  Psych/Behavioral:  Alert and oriented, appropriate affect.    Diagnostic Results:    Recent Labs  Lab 01/01/17  0437 01/02/17  0530 01/03/17  7696    WBC 8.47 8.20 9.16   HGB 10.9* 10.6* 10.5*   HCT 32.5* 31.6* 31.4*    171 178        Recent Labs  Lab 01/02/17  0530 01/03/17  0417 01/04/17  0632    139 137   K 4.0 3.9 3.8    101 101   CO2 27 26 29   BUN 22 21 20   CREATININE 1.2 1.2 1.1   GLU 90 89 82   CALCIUM 9.3 9.1 8.8        No results for input(s): INR, APTT in the last 168 hours.    Invalid input(s): PT   Microbiology Results (last 7 days)     Procedure Component Value Units Date/Time    Clostridium difficile EIA [[id number]] Collected:  01/05/17 0059    Order Status:  Sent Specimen:  Stool from Stool Updated:  01/05/17 0827           Lab Results   Component Value Date    HGBA1C 5.3 07/11/2016     No results found for: POCTGLUCOSE    Imaging Results     None          Current Facility-Administered Medications   Medication    acetaminophen tablet 650 mg    alteplase injection 2 mg    aspirin tablet 325 mg    atorvastatin tablet 40 mg    collagenase ointment    gabapentin capsule 300 mg    hydrocodone-acetaminophen 5-325mg per tablet 1 tablet    metoprolol tartrate (LOPRESSOR) tablet 25 mg    nitroGLYCERIN SL tablet 0.4 mg    ondansetron disintegrating tablet 8 mg    oxycodone-acetaminophen  mg per tablet 1 tablet    pantoprazole EC tablet 40 mg    piperacillin-tazobactam 4.5 g in dextrose 5 % 100 mL IVPB (ready to mix system)    [START ON 1/6/2017] polyethylene glycol packet 17 g    ramipril capsule 5 mg    [START ON 1/6/2017] senna-docusate 8.6-50 mg per tablet 1 tablet    sodium chloride 0.9% flush 10 mL    And    sodium chloride 0.9% flush 10 mL    tamsulosin 24 hr capsule 0.4 mg    torsemide tablet 40 mg    venlafaxine tablet 150 mg         ASSESSMENT/PLAN:     Active Hospital Problems    Diagnosis  POA    *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  Will stop Norco and remove pain scale for Percocet.  Patient to follow up with Dr. Cortés in the clinic next week per CM.  Ortho NP to see weekly while admitted to SNF. Nursing to continue pin site care per orders.  Monitor sites for s/s infection.  Encouraged patient to elevate limb when in the bed.  Not Applicable    Decubitus ulcer of left heel, stage 4 [L89.624]  Per patient had a "blister" at the time of his accident in July which has progressed into what it is today.  He was followed by wound care outpatient ([facility]) who was doing debridement.  Wound care has seen him yesterday, recommends to change wound care to santyl to wound bed and cover with moist dressing and cover with telfa island dressing to promote healing.  Wound care to follow progress weekly.  In addition, seen by ID in acute who suspects this is Osteomyelitis culture grew Pseudomonas aeruginosa and Zosyn has been initiated.  ID recommends 6 weeks of treatment, per their note target end date is 2-2; however it appears that would only provide 5 weeks.  Called ID for order clarification, message left for Dr. Jarrett and/or Kassandra Hernandez with .  Yes    Major depressive disorder, recurrent episode, mild [F33.0]  No acute issues, continue venlafaxine 150 mg bid as ordered.  Yes    Paroxysmal atrial fibrillation - new onset after trauma [I48.0]  HR is 74, continue Lopressor 25 mg bid for rate control.  Continue  mg bid.  Yes    BPH with urinary obstruction [N40.1, N13.8]  No acute issues, continue flomax 0.4 mg daily.  Yes    GERD (gastroesophageal reflux disease) [K21.9]  No acute issues, continue Protonix 40 mg daily.  Yes    Hyperlipidemia [E78.5]  No acute issues, continue atorvastatin 40 mg daily.  Yes    Essential hypertension [I10]  BP is 128/78, continue Lopressor 25 mg bid and Altace 5 mg daily.  Yes    Coronary artery disease involving native coronary artery of native heart without angina pectoris s/p RCA stent " [I25.10]  No complaints of chest pain or SOB.  Continue ACE and b-blocker  Yes    Obstructive sleep apnea on CPAP [G47.33]  Patient to continue to wear home CPAP per settings.  Yes      Resolved Hospital Problems    Diagnosis Date Resolved POA   No resolved problems to display.   Diarrhea - unclear etiology.  Stool softeners are currently on hold.  He is on IV abx, will check stool for C.diff, if negative will order lomotil.  In the mean time will add lactobacillus.    Suspected Osteoarthritis of shoulders - patient reports pain to his shoulder when ambulating with a walker.  Will trial Voltaren cream to see if it helps.      Future Appointments  Date Time Provider Department Center   1/9/2017 10:30 AM Stella Chua MD [facility] ID [facility]   1/31/2017 9:00 AM Walter Cortés MD [facility] ORTHO [facility]       DVT Prophylaxis:  mg bid      Mike Hendrix, JIMMY, FNP-BC

## 2017-01-05 NOTE — PT/OT/SLP PROGRESS
"Physical Therapy  Treatment    Janusz Montgomery Jr.   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  Pt agreeable to session. "I'm glad you came in the afternoon. I didn't sleep much last night."    Pain Ratin/10  Location - Side: Left  Location - Orientation: lower  Location: leg  Pain Addressed: Pre-medicate for activity, Reposition, Distraction (reports had more effective pain meds today)  Pain Rating Post-Intervention: 2/10    Objective:    seated EOB w/ LLE elevated on bed. Wife present. IV in progress. EF in place  Functional Mobility:  Bed Mobility :   Supine to sit: Standby Assistance   Sit to supine: Standby Assistance   Rolling: Standby Assistance   Scooting: Standby Assistance    Functional Status:  Bed/Chair/WC:  (to/from stand w/ RW w/ SBA. SPT w/ RW and close SBA. Maintains NWB LLE. SPT w/c>mat w/ RW and min assist for stability, RW management)  Distance Walk: Amb w/ RW and CGA 21 feet, w/ w/c follow and IV pole in tow. NWB LLE  Walk: Minimal Contact Assistance  Assistive Device: rolling walker  Gait Deviation(s): decreased sonali;decreased toe-to-floor clearance;decreased step length;decreased weight-shifting ability  Distance Wheelchair: porpels w/c w/ BUEs and RLE ~35 feet w/ IVpole in tow.     PT Exercises: Ankle pumps 30 RLE  Quad sets 30x  Glut sets 30x  SAQ 30x  Long arc quads 20      Additional Treatment:  LBE x15 min w/ RLE only    Patient left up in chair with call button in reach.    Assessment:  Janusz Montgomery Jr. is a 71 y.o. 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 Pt improved today, able to amb 20 feet w/ RW and CGA and most transfers SBA. Patient will benefit from continued physical therapy to address deficits and improve safety and functional mobility. Continue with physical therapy plan of care.       Rehab identified problem list/impairments: Rehab identified problem list/impairments: weakness, impaired endurance, impaired functional mobilty, gait instability, pain    Rehab potential is good.    Activity tolerance: Good    Discharge recommendations: Discharge Facility/Level Of Care Needs: home with home health     Barriers to discharge: Barriers to Discharge: Decreased caregiver support    Equipment recommendations: Equipment Needed After Discharge: none     GOALS:   Physical Therapy Goals        Problem: Physical Therapy Goal    Goal Priority Disciplines Outcome Goal Variances Interventions   Physical Therapy Goal     PT/OT, PT      Description:  Goals to be met by: 2 weeks (tentative, pending medical progress)     Patient will increase functional independence with mobility by performin. Supine to sit with Modified [place] = not met  2. Sit to supine with Modified [place]= not met  3. Sit to stand transfer with Supervision and maintaining NWB LLE= not met  4. Bed to chair transfer with Supervision using Rolling Walker and maintaining NWB LLE= not met  5. Gait  x 25 feet with Stand-by Assistance using Rolling Walker.  and maintaining NWB LLE= not met    6. Wheelchair propulsion x150 feet with Modified [place] using bilateral upper extremities= not met    7. Lower extremity exercise program x20-30 reps per handout, with assistance as needed and gym therex= not met                  PLAN:    Patient to be seen 5 x/week  to address the above listed problems via gait training, therapeutic activities,  therapeutic exercises, wheelchair management/training  Plan of Care expires:    Plan of Care reviewed with: patient    Georgia NAJERA Emre, PT  01/05/2017

## 2017-01-05 NOTE — PROGRESS NOTES
Discharge Planning Assessment     Payor: [employer] MANAGED MEDICARE / Plan: [employer] MEDICARE HMO / Product Type: Capitation /     Expected length of stay:  [] 7 days   [] 10 days  [x] 14 days   [] 21 days   [] > 30 days    Communicated expected length of stay with patient/caregiver:  [x] Yes   [] No    Anticipated discharge date:  1/16/2017 or end of IV Abx if spouse not able to learn    Assessment information obtained from:  [x] Patient   [] Caregiver     Arrived from:   [x] Home   [] Assisted Living    [] Nursing Home   [] SNF   [] Rehab  [] LTACH   [] Group Home   [] Foster Care   [] Psych   [] Shelter   [] Homeless   [] Transfer  [] Correctional Facility  [] Name of Facility:      Patient currently lives with:    [] Alone   [x] Spouse   [] Daughter   [] Son   [] Grandparents   []  Parents   [] Siblings   [] Friends   [] Domestic Partner   [] Facility Resident      [] Foster Home    [] Other:       Extended Emergency Contact Information  Primary Emergency Contact: Bri Montgomery  Address: [address] [place] [address]  Home Phone: [phone]  Work Phone: [phone]  Mobile Phone: [phone]  Relation: Spouse     Prior to hospitalization cognitive status:   [x] Alert/Oriented  [] No Deficits [] Risk of Harm to Self/Others   [] Not Oriented to Person   [] Not Oriented to Place   [] Not Oriented to Time   [] Coma/Sedated/Intubated  [] Judgement Impaired    []  Unable to Assess   [] Inappropriate Behavior  [] Infant/Toddler    Prior to hospitalization functional status:   [x] Independent   [x] Assistive Equipment   [] Assistive Person    [] Completely Dependent  [] Infant/Toddler/Child Appropriate   [] Infant/Toddler/Child Delayed    []  Adolescent     Current cognitive status:   [x] Alert/Oriented  [] No Deficits [] Risk of Harm to Self/Others   [] Not Oriented to Person   [] Not Oriented to Place   [] Not Oriented to Time   [] Coma/Sedated/Intubated  [] Judgement Impaired     []  Unable to Assess   [] Inappropriate Behavior  [] Infant/Toddler    Current functional status:     [] Independent   [x] Assistive Equipment   [x] Assistive Person     [] Completely Dependent   [] Infant/Toddler/Child Appropriate   [] Infant/Toddler/Child Delayed     [] Adolescent     Capacity to Care for Self:   Is patient able to return to prior living arrangements after discharge: [x] Yes  [] No     Is patient able to care for self after discharge?   [] Yes   [x] No     [] Pediatric     Does the patient have family/friends to assist after discharge?:  [x] Yes   [] No    [] N/A   Comments:  Spouse however she cannot assist physically      Patient/caregiver perception of discharge disposition:   [] Home   [x] Home with Family  [x] Home Health   [] SNF   [] Rehab   [] LTAC    []  New Nursing Home Placement  [] Return to Nursing Home    [] Shelter     [] Assisted Living  [] Foster Home   [] Other:      Readmit:   Has patient andreas in the hospital in the last 30 days? [] Yes   [x] No     If YES, was patient admitted for the same reason?  [] Yes   [] No       Home Health:   Patient currently receives home health services?:   [] Yes   [x] No     Patient previously received home health services and would like to resume services if necessary   [x] Yes   [] No      If YES, name of home health provider:    DME:   Patient currently uses DME:   [x] Yes   [] No        List of equipment currently used:     [x] Wheelchair   [] Standard Walker  [x] Rolling Walker  []  Rollator    [] Oxygen    [] Portable oxygen   [] Nebulizer    [] Apnea Monitor    [] Crutches  [] Hospital Bed   []  Lift Device   [] Scooter [] Cane     [] Prosthesis   [x]  BSC   [x] Tub Bench   [] Catheter Supplies    [] Ostomy Supplies   [] Trach Supplies     [] Suction Machine        [] Home Vent    [] Bipap   [x] Other: shower chair      Medications:    Can the patient afford all prescribed medications?  [x] Yes   [] No     If NO, what medication:       Is  the patient taking medications as prescribed?    [x] Yes   [] No    Financial Concerns:   Does the patient have any financial concerns?   [] Yes   [x] No      If YES, what are the concerns:      Transportation:   Does the patient have transportation to healthcare appointments?   [x] Yes   [] No     If YES, what means of transportation does the patient have?   [] Car   [x] Family/Friend  [] Bicycle   [] Motorcycle   [] Public Transportation [] Ambulance[] Wheelchair van   [] Name of Provider    Dialysis:   Does the patient currently receive dialysis?   [] Yes   [x] No      If YES, what is the name of the provider:        Miguelina Weathers MD   [address] [place] [id number] [phone]         APS/CPS involved in the case:  [] Yes   [x] No   If YES, name of :     If YES, phone number of :        Discharge Plan A:  [] Home   [x] Home with Family  [x] Home Health   [] SNF   [] Rehab   [] LTAC   []  New Nursing Home Placement  [] Return to Nursing Home    [] Assisted Living    [] Shelter     [] Private Duty Nursing   [] Foster Home    [] Psych    [] Early Steps  [] WIC       [] Home Hospice   [] Inpatient Hospice   [] Other    Discharge Plan B:  [] Home   [] Home with Family  [] Home Health   [] SNF   [] Rehab   [] LTAC  []  New Nursing Home Placement   [] Return to  Nursing Home    [] Assisted Living   [] Shelter  [] Private Duty Nursing   [] Foster Home     [] Psych     [] Early Steps  [] WIC    [] Home Hospice     [] Inpatient Hospice   [] Other     [x] Patient and family in agreement with discharge plan.    Rounded with NP Mike and pharmacist Anayeli. Patient AAO, resting in bed with Lt leg elevated on pillows. Discharge plan is to return home with limited assist of spouse. Patient on IV Abx for 6 weeks. Discharge date to be set at IDT tomorrow. Patient informed on follow up appt Monday with ID.    Bianca Domingo RN, CM Skilled  [phone]

## 2017-01-05 NOTE — CLINICAL REVIEW
Occupational Therapy  Treatment    Janusz Montgomery Jr.   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    Pain Ratin/10        Location: leg          Objective:   Pt. found Supine on Mat  Supine to sit- Supervision  Sit to supine- Supervision     Functional Status:  Eating- Modified [place]   Dressing - Lower: Maximal Assistance   Bed/Chair/WC: Minimal Contact Assistance from EOM to w/c and then with ad for bal  Balance:   Static Sit: Good: Patient able to maintain balance without handhold support, limited postural sway.  Dynamic Sit: Good: Patient accepts moderate challenge; able to maintain balance while picking object off floor.  Static Stand: Good: Patient able to maintain balance without handhold support, limited postural sway  Dynamic Stand: Fair: Patient accepts minimal challenge; able to maintain balance while turning head/trunk.    OT Exercises: UE Ergometer 15 min  Rickshaw 10# 4 x 25 reps  Lat pull downs 40# 4 x 25 reps      Patient left supine with all lines intact, call button in reach and nsg notified    ASSESSMENT:  Janusz Montgomery Jr. is a 71 y.o. 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 participated well with task on this day and will continue to benefit from continued OT to progress towards goals..    Rehab identified problem list/impairments:   Rehab identified problem  list/impairments: weakness, impaired endurance, impaired self care skills, impaired balance, gait instability, impaired functional mobilty, decreased lower extremity function, decreased ROM, edema, orthopedic precautions, impaired skin, pain    Rehab potential is good    Activity tolerance: Good    Discharge recommendations: home health OT     Barriers to discharge: Barriers to Discharge: Decreased caregiver support     Equipment recommendations: none     GOALS:   Occupational Therapy Goals        Problem: Occupational Therapy Goal    Goal Priority Disciplines Outcome Interventions   Occupational Therapy Goal     OT, PT/OT Ongoing (interventions implemented as appropriate)    Description:  Goals to be met by: 14 days     Patient will increase functional independence with ADLs by performing:    LE Dressing with Set-up Assistance and Assistive Devices as needed.  Toileting from bedside commode with Minimal Assistance for hygiene and clothing management.   Bathing from  edge of bed with Minimal Assistance.  Stand pivot transfers with Modified [place].  Toilet transfer to bedside commode with Modified [place].                Plan:  Patient to be seen 5 x/week to address the above listed problems via self-care/home management, therapeutic activities, therapeutic exercises  Plan of Care expires:    Plan of Care reviewed with: patient  The [facility]IK and [facility]OR[facility]O have collaborated and discussed the patient's status, treatment plan and progress toward established goals.   SIRIA Morris 1/5/2017

## 2017-01-05 NOTE — PLAN OF CARE
Problem: Physical Therapy Goal  Goal: Physical Therapy Goal  Goals to be met by: 2 weeks (tentative, pending medical progress)     Patient will increase functional independence with mobility by performin. Supine to sit with Modified [place] = not met  2. Sit to supine with Modified [place]= not met  3. Sit to stand transfer with Supervision and maintaining NWB LLE= not met  4. Bed to chair transfer with Supervision using Rolling Walker and maintaining NWB LLE= not met  5. Gait x 25 feet with Stand-by Assistance using Rolling Walker. and maintaining NWB LLE= not met    6. Wheelchair propulsion x150 feet with Modified [place] using bilateral upper extremities= not met    7. Lower extremity exercise program x20-30 reps per handout, with assistance as needed and gym therex= not met   Goals remain appropriate. Continue with Physical therapy Plan of Care. Georgia Andrea, PT 2017

## 2017-01-05 NOTE — PLAN OF CARE
Problem: Occupational Therapy Goal  Goal: Occupational Therapy Goal  Goals to be met by: 14 days     Patient will increase functional independence with ADLs by performing:    LE Dressing with Set-up Assistance and Assistive Devices as needed.  Toileting from bedside commode with Minimal Assistance for hygiene and clothing management.   Bathing from edge of bed with Minimal Assistance.  Stand pivot transfers with Modified [place].  Toilet transfer to bedside commode with Modified [place].   Outcome: Ongoing (interventions implemented as appropriate)  .

## 2017-01-06 LAB
ALBUMIN SERPL BCP-MCNC: 2.9 G/DL
ALP SERPL-CCNC: 124 U/L
ALT SERPL W/O P-5'-P-CCNC: 20 U/L
ANION GAP SERPL CALC-SCNC: 9 MMOL/L
AST SERPL-CCNC: 19 U/L
BASOPHILS # BLD AUTO: 0.04 K/UL
BASOPHILS NFR BLD: 0.4 %
BILIRUB SERPL-MCNC: 0.6 MG/DL
BUN SERPL-MCNC: 17 MG/DL
CALCIUM SERPL-MCNC: 9 MG/DL
CHLORIDE SERPL-SCNC: 101 MMOL/L
CO2 SERPL-SCNC: 28 MMOL/L
CREAT SERPL-MCNC: 1.2 MG/DL
DIFFERENTIAL METHOD: ABNORMAL
EOSINOPHIL # BLD AUTO: 0.4 K/UL
EOSINOPHIL NFR BLD: 4.5 %
ERYTHROCYTE [DISTWIDTH] IN BLOOD BY AUTOMATED COUNT: 14.4 %
EST. GFR  (AFRICAN AMERICAN): >60 ML/MIN/1.73 M^2
EST. GFR  (NON AFRICAN AMERICAN): >60 ML/MIN/1.73 M^2
GLUCOSE SERPL-MCNC: 87 MG/DL
HCT VFR BLD AUTO: 32.5 %
HGB BLD-MCNC: 10.4 G/DL
LYMPHOCYTES # BLD AUTO: 2.9 K/UL
LYMPHOCYTES NFR BLD: 29.7 %
MCH RBC QN AUTO: 31.7 PG
MCHC RBC AUTO-ENTMCNC: 32 %
MCV RBC AUTO: 99 FL
MONOCYTES # BLD AUTO: 0.7 K/UL
MONOCYTES NFR BLD: 6.9 %
NEUTROPHILS # BLD AUTO: 5.6 K/UL
NEUTROPHILS NFR BLD: 58.5 %
PLATELET # BLD AUTO: 242 K/UL
PMV BLD AUTO: 10.5 FL
POTASSIUM SERPL-SCNC: 3.7 MMOL/L
PROT SERPL-MCNC: 6.7 G/DL
RBC # BLD AUTO: 3.28 M/UL
SODIUM SERPL-SCNC: 138 MMOL/L
WBC # BLD AUTO: 9.63 K/UL

## 2017-01-06 PROCEDURE — 97530 THERAPEUTIC ACTIVITIES: CPT

## 2017-01-06 PROCEDURE — 11000004 HC SNF PRIVATE

## 2017-01-06 PROCEDURE — 25000003 PHARM REV CODE 250: Performed by: NURSE PRACTITIONER

## 2017-01-06 PROCEDURE — 97116 GAIT TRAINING THERAPY: CPT

## 2017-01-06 PROCEDURE — 63600175 PHARM REV CODE 636 W HCPCS: Performed by: NURSE PRACTITIONER

## 2017-01-06 PROCEDURE — 97110 THERAPEUTIC EXERCISES: CPT

## 2017-01-06 PROCEDURE — 80053 COMPREHEN METABOLIC PANEL: CPT

## 2017-01-06 PROCEDURE — 25000003 PHARM REV CODE 250: Performed by: STUDENT IN AN ORGANIZED HEALTH CARE EDUCATION/TRAINING PROGRAM

## 2017-01-06 PROCEDURE — 97535 SELF CARE MNGMENT TRAINING: CPT

## 2017-01-06 PROCEDURE — 85025 COMPLETE CBC W/AUTO DIFF WBC: CPT

## 2017-01-06 PROCEDURE — 36415 COLL VENOUS BLD VENIPUNCTURE: CPT

## 2017-01-06 RX ADMIN — COLLAGENASE SANTYL: 250 OINTMENT TOPICAL at 09:01

## 2017-01-06 RX ADMIN — Medication 10 ML: at 05:01

## 2017-01-06 RX ADMIN — METOPROLOL TARTRATE 25 MG: 25 TABLET ORAL at 09:01

## 2017-01-06 RX ADMIN — OXYCODONE HYDROCHLORIDE AND ACETAMINOPHEN 1 TABLET: 10; 325 TABLET ORAL at 09:01

## 2017-01-06 RX ADMIN — LACTOBACILLUS ACIDOPHILUS / LACTOBACILLUS BULGARICUS 1 EACH: 100 MILLION CFU STRENGTH GRANULES at 09:01

## 2017-01-06 RX ADMIN — ATORVASTATIN CALCIUM 40 MG: 20 TABLET, FILM COATED ORAL at 09:01

## 2017-01-06 RX ADMIN — GABAPENTIN 300 MG: 300 CAPSULE ORAL at 01:01

## 2017-01-06 RX ADMIN — VENLAFAXINE 150 MG: 37.5 TABLET ORAL at 09:01

## 2017-01-06 RX ADMIN — TORSEMIDE 40 MG: 20 TABLET ORAL at 09:01

## 2017-01-06 RX ADMIN — RAMIPRIL 5 MG: 2.5 CAPSULE ORAL at 09:01

## 2017-01-06 RX ADMIN — GABAPENTIN 300 MG: 300 CAPSULE ORAL at 09:01

## 2017-01-06 RX ADMIN — TAMSULOSIN HYDROCHLORIDE 0.4 MG: 0.4 CAPSULE ORAL at 09:01

## 2017-01-06 RX ADMIN — GABAPENTIN 300 MG: 300 CAPSULE ORAL at 05:01

## 2017-01-06 RX ADMIN — Medication 10 ML: at 12:01

## 2017-01-06 RX ADMIN — PIPERACILLIN SODIUM,TAZOBACTAM SODIUM 4.5 G: 4; .5 INJECTION, POWDER, FOR SOLUTION INTRAVENOUS at 04:01

## 2017-01-06 RX ADMIN — DICLOFENAC 2 G: 10 GEL TOPICAL at 09:01

## 2017-01-06 RX ADMIN — ASPIRIN 325 MG ORAL TABLET 325 MG: 325 PILL ORAL at 09:01

## 2017-01-06 RX ADMIN — PANTOPRAZOLE SODIUM 40 MG: 40 TABLET, DELAYED RELEASE ORAL at 09:01

## 2017-01-06 RX ADMIN — PIPERACILLIN SODIUM,TAZOBACTAM SODIUM 4.5 G: 4; .5 INJECTION, POWDER, FOR SOLUTION INTRAVENOUS at 12:01

## 2017-01-06 RX ADMIN — PIPERACILLIN SODIUM,TAZOBACTAM SODIUM 4.5 G: 4; .5 INJECTION, POWDER, FOR SOLUTION INTRAVENOUS at 09:01

## 2017-01-06 RX ADMIN — OXYCODONE HYDROCHLORIDE AND ACETAMINOPHEN 1 TABLET: 10; 325 TABLET ORAL at 06:01

## 2017-01-06 RX ADMIN — Medication 10 ML: at 06:01

## 2017-01-06 NOTE — PLAN OF CARE
Problem: Physical Therapy Goal  Goal: Physical Therapy Goal  Goals to be met by: 2 weeks (tentative, pending medical progress)     Patient will increase functional independence with mobility by performin. Supine to sit with Modified [place] = not met  2. Sit to supine with Modified [place]= not met  3. Sit to stand transfer with Supervision and maintaining NWB LLE= not met  4. Bed to chair transfer with Supervision using Rolling Walker and maintaining NWB LLE= not met  5. Gait x 25 feet with Stand-by Assistance using Rolling Walker. and maintaining NWB LLE= not met    6. Wheelchair propulsion x150 feet with Modified [place] using bilateral upper extremities= not met    7. Lower extremity exercise program x20-30 reps per handout, with assistance as needed and gym therex= not met   Outcome: Ongoing (interventions implemented as appropriate)  Goals remain appropriate.

## 2017-01-06 NOTE — PLAN OF CARE
Problem: Patient Care Overview  Goal: Plan of Care Review  Outcome: Ongoing (interventions implemented as appropriate)    01/06/17 1655   Coping/Psychosocial   Plan Of Care Reviewed With patient         Problem: Infection, Risk/Actual (Adult)  Goal: Infection Prevention/Resolution  Patient will demonstrate the desired outcomes by discharge/transition of care.   Outcome: Ongoing (interventions implemented as appropriate)    01/06/17 1655   Infection, Risk/Actual (Adult)   Infection Prevention/Resolution making progress toward outcome         Problem: Infection, Risk/Actual (Adult)  Goal: Infection Prevention/Resolution  Patient will demonstrate the desired outcomes by discharge/transition of care.   Outcome: Ongoing (interventions implemented as appropriate)    01/06/17 1655   Infection, Risk/Actual (Adult)   Infection Prevention/Resolution making progress toward outcome         Comments:   Patient monitored every 1 to 2 hours for pain and safety. Safety maintained.  Patient instructed to call for assistance.Call  Light and persoanl items in reach.

## 2017-01-06 NOTE — PT/OT/SLP PROGRESS
Occupational Therapy  Treatment    Janusz Montgomery Jr.   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    Pain Ratin/10  Location - Side: Left  Location - Orientation: lower  Location: leg  Pain Addressed: Pre-medicate for activity  Pain Rating Post-Intervention: 2/10    Objective:  Patient found with:  (in w/c) with ext fixator    Functional Mobility:    Functional Status:  Bathing: Moderate Assistance (assist with buttocks and L foot)  Dressing - Lower: Minimal Contact Assistance (doff and maribel shorts;underwear with reacher from w/c and to stand from w/c and hold onto bedrail--assist only to minimally pull pants over hip)  Bed/Chair/WC: Supervision or Setup (SBA sit to stand from w/c at bed with rail 2x)    Balance:   Static Sit: Good: Patient able to maintain balance without handhold support, limited postural sway.  Dynamic Sit: Good: Patient accepts moderate challenge; able to maintain balance while picking object off floor.  Static Stand: Fair: Patient able to maintain balance with handhold support; may require occasional minimal assistance.  Dynamic Stand: Fair: Patient accepts minimal challenge; able to maintain balance while turning head/trunk.    OT Exercises: UE Ergometer 15 min  Rickshaw 25# 25 x 4  Lat pull downs 25# 15 x 4  Rowing 20# 15 x 4    Additional Treatment:  Pt propels w/c with RLE and BUE's as needed from gym to room and in room. Pt indep with brake management     Patient left up in chair with call button in  reach    ASSESSMENT:  Pt tolerated tx and demonstrated increased indep with w/c mobiltiy, t/f's, LBD and functional endurance. Pt cont to benefit from OT to increase funcitonal indep and safety    Rehab identified problem list/impairments: Rehab identified problem list/impairments: weakness, impaired endurance, impaired self care skills, impaired functional mobilty, impaired balance, gait instability, decreased lower extremity function, pain, edema, impaired skin    Rehab potential is good    Activity tolerance: Good    Discharge recommendations: home with home health     Barriers to discharge: Barriers to Discharge: Decreased caregiver support     Equipment recommendations: none     GOALS:   Occupational Therapy Goals        Problem: Occupational Therapy Goal    Goal Priority Disciplines Outcome Interventions   Occupational Therapy Goal     OT, PT/OT Ongoing (interventions implemented as appropriate)    Description:  Goals to be met by: 14 days     Patient will increase functional independence with ADLs by performing:    LE Dressing with Set-up Assistance and Assistive Devices as needed.  Toileting from bedside commode with Minimal Assistance for hygiene and clothing management.   Bathing from  edge of bed with Minimal Assistance.  Stand pivot transfers with Modified [place].  Toilet transfer to bedside commode with Modified [place].                Plan:  Patient to be seen 5 x/week to address the above listed problems via self-care/home management, therapeutic activities, therapeutic exercises  Plan of Care reviewed with: patient    NIK Munson  01/06/2017

## 2017-01-06 NOTE — PLAN OF CARE
Problem: Occupational Therapy Goal  Goal: Occupational Therapy Goal  Goals to be met by: 14 days     Patient will increase functional independence with ADLs by performing:    LE Dressing with Set-up Assistance and Assistive Devices as needed.  Toileting from bedside commode with Minimal Assistance for hygiene and clothing management.   Bathing from edge of bed with Minimal Assistance.  Stand pivot transfers with Modified [place].  Toilet transfer to bedside commode with Modified [place].   Outcome: Ongoing (interventions implemented as appropriate)  Increased LBD indep. NIK Munson  1/6/2017

## 2017-01-06 NOTE — PLAN OF CARE
Problem: Fall Risk (Adult)  Goal: Absence of Falls  Patient will demonstrate the desired outcomes by discharge/transition of care.   Outcome: Ongoing (interventions implemented as appropriate)  Pt. had no falls this shift.will continue to monitor pt.

## 2017-01-06 NOTE — PT/OT/SLP PROGRESS
"Physical Therapy  Treatment    Janusz Montgomery Jr.   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    "I'm ready."    Pain Ratin/10  Location - Side: Left     Location: leg  Pain Addressed: Pre-medicate for activity, Cessation of Activity, Reposition  Pain Rating Post-Intervention: 10    Objective:  Patient found with: peripheral IV (supine in bed )     Functional Mobility:  Bed Mobility :   Supine <> sit with SBA    Transfers:  Bed/Chair/WC: Supervision or Setup  Sit <> stand from EOB, w/c, EOM with RW and CGA/SBA  SPT from bed > w/c <> mat with CGA    Gait:  Distance Walk: pt ambulated x 31 feet and 32 feet NWB LLE, with RW and min/CGA for balance.  w/c in tow  Walk: Minimal Contact Assistance  Assistive Device: rolling walker  Gait Deviation(s): decreased sonali;decreased toe-to-floor clearance;decreased weight-shifting ability;decreased velocity of limb motion;decreased step length;decreased stride length;decreased swing-to-stance ratio     Balance:   Static Sit: Normal: Patient able to maintain steady balance without handhold support.  Dynamic Sit:  Good: Patient accepts moderate challenge; able to maintain balance while picking object off floor.  Static Stand: Fair: Patient able to maintain balance with handhold support; may require occasional minimal assistance.  Dynamic stand: Poor: Patient unable to accept challenge or move without loss of balance.    PT Exercises:   Ankle pumps 30  Quad sets 30  Glut sets 30  Short arc quads 30  Heel slides " 30  ABduction 30  ADduction 30    Additional Treatment:  LBE x 15 minutes with RLE for strengthening.    Patient left up in chair with OT in gym    Assessment:  Janusz Montgomery Jr. is a 71 y.o. 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 and presents with improving strength for transfers and gait. Pt ambulated x 32 feet and 31 feet with NWB LLE and CGA-min A to maintain balance and safety.  Pt would continue to benefit from PT services to improve his mobility.  Goals remain appropriate.      Rehab identified problem list/impairments: Rehab identified problem list/impairments: weakness, impaired endurance, impaired self care skills, impaired functional mobilty, gait instability, impaired balance, decreased coordination, decreased upper extremity function, decreased lower extremity function, decreased safety awareness, pain, decreased ROM, impaired coordination    Rehab potential is fair.    Activity tolerance: good    Discharge recommendations: Discharge Facility/Level Of Care Needs: home with home health     Barriers to discharge: Barriers to Discharge: Decreased caregiver support    Equipment recommendations: Equipment Needed After Discharge: none     GOALS:   Physical Therapy Goals        Problem: Physical Therapy Goal    Goal Priority Disciplines Outcome Goal Variances Interventions   Physical Therapy Goal     PT/OT, PT Ongoing (interventions implemented as appropriate)     Description:  Goals to be met by: 2 weeks (tentative, pending medical progress)     Patient will increase functional independence with mobility by performin. Supine to sit with Modified [place] = not met  2. Sit to supine with Modified [place]= not met  3. Sit to stand transfer with Supervision and maintaining NWB LLE= not met  4. Bed to chair transfer with Supervision using Rolling Walker and maintaining NWB LLE= not met  5. Gait  x 25 feet with Stand-by  Assistance using Rolling Walker.  and maintaining NWB LLE= not met    6. Wheelchair propulsion x150 feet with Modified [place] using bilateral upper extremities= not met    7. Lower extremity exercise program x20-30 reps per handout, with assistance as needed and gym therex= not met                  PLAN:    Patient to be seen 5 x/week  to address the above listed problems via gait training, therapeutic activities, therapeutic exercises, wheelchair management/training  Plan of Care expires:    Plan of Care reviewed with: patient    Jevon CAMARILLO Kassandra, PTA  01/06/2017

## 2017-01-06 NOTE — CLINICAL REVIEW
Spoke with JOVI Blackwell in ID, she advised end date of IV abx should be 2-9 and not 2-2.  Will adjust end time of Zosyn.

## 2017-01-06 NOTE — PROGRESS NOTES
Discharge Planning-Informed patient discharge date is 2/10/2017 after completion of IV Abx. Patient stated understanding. Discharge date written on dry erase board in room.

## 2017-01-07 PROCEDURE — 25000003 PHARM REV CODE 250: Performed by: NURSE PRACTITIONER

## 2017-01-07 PROCEDURE — 97116 GAIT TRAINING THERAPY: CPT

## 2017-01-07 PROCEDURE — 25000003 PHARM REV CODE 250: Performed by: STUDENT IN AN ORGANIZED HEALTH CARE EDUCATION/TRAINING PROGRAM

## 2017-01-07 PROCEDURE — 97110 THERAPEUTIC EXERCISES: CPT

## 2017-01-07 PROCEDURE — 11000004 HC SNF PRIVATE

## 2017-01-07 PROCEDURE — 25000003 PHARM REV CODE 250: Performed by: INTERNAL MEDICINE

## 2017-01-07 PROCEDURE — 97530 THERAPEUTIC ACTIVITIES: CPT

## 2017-01-07 RX ADMIN — ATORVASTATIN CALCIUM 40 MG: 20 TABLET, FILM COATED ORAL at 09:01

## 2017-01-07 RX ADMIN — ASPIRIN 325 MG ORAL TABLET 325 MG: 325 PILL ORAL at 09:01

## 2017-01-07 RX ADMIN — Medication 10 ML: at 12:01

## 2017-01-07 RX ADMIN — TAMSULOSIN HYDROCHLORIDE 0.4 MG: 0.4 CAPSULE ORAL at 09:01

## 2017-01-07 RX ADMIN — RAMIPRIL 5 MG: 2.5 CAPSULE ORAL at 09:01

## 2017-01-07 RX ADMIN — METOPROLOL TARTRATE 25 MG: 25 TABLET ORAL at 10:01

## 2017-01-07 RX ADMIN — Medication 10 ML: at 05:01

## 2017-01-07 RX ADMIN — COLLAGENASE SANTYL: 250 OINTMENT TOPICAL at 09:01

## 2017-01-07 RX ADMIN — PANTOPRAZOLE SODIUM 40 MG: 40 TABLET, DELAYED RELEASE ORAL at 09:01

## 2017-01-07 RX ADMIN — VENLAFAXINE 150 MG: 37.5 TABLET ORAL at 09:01

## 2017-01-07 RX ADMIN — Medication 10 ML: at 11:01

## 2017-01-07 RX ADMIN — STANDARDIZED SENNA CONCENTRATE AND DOCUSATE SODIUM 1 TABLET: 8.6; 5 TABLET, FILM COATED ORAL at 10:01

## 2017-01-07 RX ADMIN — LACTOBACILLUS ACIDOPHILUS / LACTOBACILLUS BULGARICUS 1 EACH: 100 MILLION CFU STRENGTH GRANULES at 10:01

## 2017-01-07 RX ADMIN — GABAPENTIN 300 MG: 300 CAPSULE ORAL at 01:01

## 2017-01-07 RX ADMIN — PIPERACILLIN SODIUM,TAZOBACTAM SODIUM 4.5 G: 4; .5 INJECTION, POWDER, FOR SOLUTION INTRAVENOUS at 04:01

## 2017-01-07 RX ADMIN — ASPIRIN 325 MG ORAL TABLET 325 MG: 325 PILL ORAL at 10:01

## 2017-01-07 RX ADMIN — PIPERACILLIN SODIUM,TAZOBACTAM SODIUM 4.5 G: 4; .5 INJECTION, POWDER, FOR SOLUTION INTRAVENOUS at 10:01

## 2017-01-07 RX ADMIN — Medication 10 ML: at 01:01

## 2017-01-07 RX ADMIN — DICLOFENAC 2 G: 10 GEL TOPICAL at 09:01

## 2017-01-07 RX ADMIN — VENLAFAXINE 150 MG: 37.5 TABLET ORAL at 10:01

## 2017-01-07 RX ADMIN — METOPROLOL TARTRATE 25 MG: 25 TABLET ORAL at 09:01

## 2017-01-07 RX ADMIN — TORSEMIDE 40 MG: 20 TABLET ORAL at 09:01

## 2017-01-07 RX ADMIN — GABAPENTIN 300 MG: 300 CAPSULE ORAL at 10:01

## 2017-01-07 RX ADMIN — LACTOBACILLUS ACIDOPHILUS / LACTOBACILLUS BULGARICUS 1 EACH: 100 MILLION CFU STRENGTH GRANULES at 09:01

## 2017-01-07 RX ADMIN — PIPERACILLIN SODIUM,TAZOBACTAM SODIUM 4.5 G: 4; .5 INJECTION, POWDER, FOR SOLUTION INTRAVENOUS at 12:01

## 2017-01-07 RX ADMIN — GABAPENTIN 300 MG: 300 CAPSULE ORAL at 05:01

## 2017-01-07 RX ADMIN — OXYCODONE HYDROCHLORIDE AND ACETAMINOPHEN 1 TABLET: 10; 325 TABLET ORAL at 09:01

## 2017-01-07 NOTE — PLAN OF CARE
Problem: Patient Care Overview  Goal: Plan of Care Review  Outcome: Ongoing (interventions implemented as appropriate)    01/07/17 1203   Coping/Psychosocial   Plan Of Care Reviewed With patient         Problem: Infection, Risk/Actual (Adult)  Goal: Infection Prevention/Resolution  Patient will demonstrate the desired outcomes by discharge/transition of care.   Outcome: Ongoing (interventions implemented as appropriate)    01/07/17 1203   Infection, Risk/Actual (Adult)   Infection Prevention/Resolution making progress toward outcome         Problem: Fall Risk (Adult)  Goal: Absence of Falls  Patient will demonstrate the desired outcomes by discharge/transition of care.   Outcome: Ongoing (interventions implemented as appropriate)    01/07/17 1203   Fall Risk (Adult)   Absence of Falls making progress toward outcome         Problem: Pressure Ulcer Risk (Isidoro Scale) (Adult,Obstetrics,Pediatric)  Goal: Skin Integrity  Patient will demonstrate the desired outcomes by discharge/transition of care.   Outcome: Ongoing (interventions implemented as appropriate)    01/07/17 1203   Pressure Ulcer Risk (Isidoro Scale) (Adult,Obstetrics,Pediatric)   Skin Integrity making progress toward outcome         Comments:   Patient monitored every 1 to 2 hours for pain and safety. Safety maintained.  Patient instructed to call for assistance.Call  Light and persoanl items in reach.

## 2017-01-07 NOTE — PT/OT/SLP PROGRESS
Occupational Therapy  Treatment    Janusz Montgomery Jr.   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    Pain Ratin/10                   Objective:  Patient found with: PICC line (external fixator)    Functional Mobility:  Bed Mobility:   Supine to sit: Modified Independent   Sit to supine: Modified Independent   Rolling: Modified Independent   Scooting: Modified Independent    Functional Status:  Bed/Chair/WC: Supervision or Setup (from w/c to and from mat no AD)      Balance:   Static Sit: Good: Patient able to maintain balance without handhold support, limited postural sway.  Dynamic Sit: Good: Patient accepts moderate challenge; able to maintain balance while picking object off floor.  Static Stand: Fair: Patient able to maintain balance with handhold support; may require occasional minimal assistance.  Dynamic Stand: Fair: Patient accepts minimal challenge; able to maintain balance while turning head/trunk.    OT Exercises: UE Ergometer 15 min  Rickshaw 30# 25 x 4  Lat pull downs 30# 15 x 4   Chest press supine 18# 15 x 1; 10 x 2    Additional Treatment:  Pt performed bridging with LLE on theraball 10reps x 3 sets  Pt performed crunches 10reps x 3 sets  Pt performed seated lateral trunk flexion to increase core strength x 30 reps  Educated pt re: w/c pushups      Patient left up in chair with tech transport    ASSESSMENT:  Pt tolerated tx and demonstrated increased indep with t/f's and UE strength and endurance. Pt will cont to benefit from OT to increase  functional indep and safety to increase functional indep    Rehab identified problem list/impairments: Rehab identified problem list/impairments: weakness, impaired endurance, impaired self care skills, impaired functional mobilty, pain, decreased coordination, impaired balance, gait instability, decreased lower extremity function, impaired skin, orthopedic precautions, edema    Rehab potential is good    Activity tolerance: Good    Discharge recommendations: home with home health     Barriers to discharge: Barriers to Discharge: Decreased caregiver support     Equipment recommendations: none     GOALS:   Occupational Therapy Goals        Problem: Occupational Therapy Goal    Goal Priority Disciplines Outcome Interventions   Occupational Therapy Goal     OT, PT/OT Ongoing (interventions implemented as appropriate)    Description:  Goals to be met by: 14 days     Patient will increase functional independence with ADLs by performing:    LE Dressing with Set-up Assistance and Assistive Devices as needed.  Toileting from bedside commode with Minimal Assistance for hygiene and clothing management.   Bathing from  edge of bed with Minimal Assistance.  Stand pivot transfers with Modified [place].  Toilet transfer to bedside commode with Modified [place].                Plan:  Patient to be seen 5 x/week to address the above listed problems via self-care/home management, therapeutic activities, therapeutic exercises  Plan of Care expires:    Plan of Care reviewed with: patient    NIK Munson  01/07/2017

## 2017-01-07 NOTE — PLAN OF CARE
Problem: Physical Therapy Goal  Goal: Physical Therapy Goal  Goals to be met by: 2 weeks (tentative, pending medical progress)     Patient will increase functional independence with mobility by performin. Supine to sit with Modified [place] = not met  2. Sit to supine with Modified [place]= not met  3. Sit to stand transfer with Supervision and maintaining NWB LLE= not met  4. Bed to chair transfer with Supervision using Rolling Walker and maintaining NWB LLE= not met  5. Gait x 25 feet with Stand-by Assistance using Rolling Walker. and maintaining NWB LLE= not met    6. Wheelchair propulsion x150 feet with Modified [place] using bilateral upper extremities= not met    7. Lower extremity exercise program x20-30 reps per handout, with assistance as needed and gym therex= not met   Outcome: Ongoing (interventions implemented as appropriate)  Goals remain appropriate.

## 2017-01-07 NOTE — PT/OT/SLP PROGRESS
"Physical Therapy  Treatment    Janusz Montgomery Jr.   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    PT Received On: 17                     Billable Minutes:  Gait [id number], Therapeutic Activity 8 and Therapeutic Exercise 30    Treatment Type: Treatment  PT/PTA: PTA     PTA Visit Number: 2       General Precautions: Standard, fall  Orthopedic Precautions: LLE non weight bearing   Braces:           Subjective:  "I can eat later."    Pain Ratin/10           Pain Addressed: Pre-medicate for activity  Pain Rating Post-Intervention: 0/10    Objective:  Patient found with: peripheral IV (supine in bed)     Functional Mobility:  Bed Mobility :   Supine > sit with (S)    Functional Status:  Bed/Chair/WC: Supervision or Setup  Sit > stand from EOB, w/c with SBA  SPT from bed > w/c with SBA    Gait:  Distance Walk: pt ambulated x 37 feet and 40 feet with NWB LLE, RW and CGA for balance and safety.  w/c in tow  Walk: Minimal Contact Assistance  Assistive Device: rolling walker  Gait Deviation(s): decreased sonali;decreased swing-to-stance ratio;decreased toe-to-floor clearance;decreased weight-shifting ability;decreased velocity of limb motion;decreased step length;decreased stride length    Wheelchair: Exception (Household Locomotion)  Distance Wheelchair: 150 feet with RLE and (S)     Balance:   Static Sit: Normal: Patient able to maintain steady balance without handhold support.  Dynamic Sit:  Good: Patient accepts moderate challenge; able to maintain balance while picking object off floor.  Static Stand: Good: Patient able to maintain balance without handhold support, limited postural sway  Dynamic stand: Fair: Patient accepts minimal challenge; able to maintain balance while turning head/trunk.    PT Exercises:   Ankle pumps 30  Quad sets 30  Glut sets 30  Hip flexion 30  Long arc quads 30  Knee flex 30  ABduction 30  ADduction " 30    Additional Treatment:  LBE x 15 minutes with RLE only for strengthening    Patient left up in chair with OT in gym    Assessment:  Janusz Montgomery Jr. is a 71 y.o. 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  Pt tolerated therapy well without complaint. PT will benefit from continued mobility to ensure safe mobility and mobility improvements.  Goals remain appropriate.         Rehab identified problem list/impairments: Rehab identified problem list/impairments: weakness, impaired endurance, impaired self care skills, impaired functional mobilty, gait instability, impaired balance, decreased coordination, decreased upper extremity function, decreased lower extremity function, decreased safety awareness, decreased ROM, impaired coordination    Rehab potential is fair.    Activity tolerance: Good    Discharge recommendations: Discharge Facility/Level Of Care Needs: home with home health     Barriers to discharge: Barriers to Discharge: Decreased caregiver support    Equipment recommendations: Equipment Needed After Discharge: none     GOALS:   Physical Therapy Goals        Problem: Physical Therapy Goal    Goal Priority Disciplines Outcome Goal Variances Interventions   Physical Therapy Goal     PT/OT, PT Ongoing (interventions implemented as appropriate)     Description:  Goals to be met by: 2 weeks (tentative, pending medical progress)     Patient will increase functional independence with mobility by performin. Supine to sit with Modified [place] = not met  2. Sit to supine with Modified [place]= not met  3. Sit to stand transfer with Supervision and maintaining NWB LLE= not met  4. Bed to chair transfer with Supervision using Rolling Walker and maintaining NWB LLE= not met  5. Gait  x 25 feet with Stand-by Assistance using Rolling Walker.  and maintaining NWB LLE= not  met    6. Wheelchair propulsion x150 feet with Modified [place] using bilateral upper extremities= not met    7. Lower extremity exercise program x20-30 reps per handout, with assistance as needed and gym therex= not met                  PLAN:    Patient to be seen 5 x/week  to address the above listed problems via gait training, therapeutic activities, therapeutic exercises, wheelchair management/training  Plan of Care expires:    Plan of Care reviewed with: patient    Usman-Brenda RBAIA Cannon, PTA  01/07/2017

## 2017-01-07 NOTE — PLAN OF CARE
Problem: Occupational Therapy Goal  Goal: Occupational Therapy Goal  Goals to be met by: 14 days     Patient will increase functional independence with ADLs by performing:    LE Dressing with Set-up Assistance and Assistive Devices as needed.  Toileting from bedside commode with Minimal Assistance for hygiene and clothing management.   Bathing from edge of bed with Minimal Assistance.  Stand pivot transfers with Modified [place].  Toilet transfer to bedside commode with Modified [place].   Outcome: Ongoing (interventions implemented as appropriate)  Progressing toward goals. NIK Munson  1/7/2017

## 2017-01-08 PROCEDURE — 11000004 HC SNF PRIVATE

## 2017-01-08 PROCEDURE — 63600175 PHARM REV CODE 636 W HCPCS: Performed by: NURSE PRACTITIONER

## 2017-01-08 PROCEDURE — 25000003 PHARM REV CODE 250: Performed by: NURSE PRACTITIONER

## 2017-01-08 PROCEDURE — 25000003 PHARM REV CODE 250: Performed by: STUDENT IN AN ORGANIZED HEALTH CARE EDUCATION/TRAINING PROGRAM

## 2017-01-08 PROCEDURE — 25000003 PHARM REV CODE 250: Performed by: INTERNAL MEDICINE

## 2017-01-08 RX ADMIN — PIPERACILLIN SODIUM,TAZOBACTAM SODIUM 4.5 G: 4; .5 INJECTION, POWDER, FOR SOLUTION INTRAVENOUS at 01:01

## 2017-01-08 RX ADMIN — GABAPENTIN 300 MG: 300 CAPSULE ORAL at 06:01

## 2017-01-08 RX ADMIN — GABAPENTIN 300 MG: 300 CAPSULE ORAL at 09:01

## 2017-01-08 RX ADMIN — STANDARDIZED SENNA CONCENTRATE AND DOCUSATE SODIUM 1 TABLET: 8.6; 5 TABLET, FILM COATED ORAL at 09:01

## 2017-01-08 RX ADMIN — ACETAMINOPHEN 650 MG: 325 TABLET ORAL at 11:01

## 2017-01-08 RX ADMIN — VENLAFAXINE 150 MG: 37.5 TABLET ORAL at 09:01

## 2017-01-08 RX ADMIN — VENLAFAXINE 150 MG: 37.5 TABLET ORAL at 11:01

## 2017-01-08 RX ADMIN — PIPERACILLIN SODIUM,TAZOBACTAM SODIUM 4.5 G: 4; .5 INJECTION, POWDER, FOR SOLUTION INTRAVENOUS at 06:01

## 2017-01-08 RX ADMIN — METOPROLOL TARTRATE 25 MG: 25 TABLET ORAL at 09:01

## 2017-01-08 RX ADMIN — Medication 10 ML: at 06:01

## 2017-01-08 RX ADMIN — ATORVASTATIN CALCIUM 40 MG: 20 TABLET, FILM COATED ORAL at 11:01

## 2017-01-08 RX ADMIN — Medication 10 ML: at 11:01

## 2017-01-08 RX ADMIN — LACTOBACILLUS ACIDOPHILUS / LACTOBACILLUS BULGARICUS 1 EACH: 100 MILLION CFU STRENGTH GRANULES at 09:01

## 2017-01-08 RX ADMIN — STANDARDIZED SENNA CONCENTRATE AND DOCUSATE SODIUM 1 TABLET: 8.6; 5 TABLET, FILM COATED ORAL at 11:01

## 2017-01-08 RX ADMIN — ASPIRIN 325 MG ORAL TABLET 325 MG: 325 PILL ORAL at 11:01

## 2017-01-08 RX ADMIN — DICLOFENAC 2 G: 10 GEL TOPICAL at 11:01

## 2017-01-08 RX ADMIN — METOPROLOL TARTRATE 25 MG: 25 TABLET ORAL at 11:01

## 2017-01-08 RX ADMIN — TAMSULOSIN HYDROCHLORIDE 0.4 MG: 0.4 CAPSULE ORAL at 11:01

## 2017-01-08 RX ADMIN — PIPERACILLIN SODIUM,TAZOBACTAM SODIUM 4.5 G: 4; .5 INJECTION, POWDER, FOR SOLUTION INTRAVENOUS at 09:01

## 2017-01-08 RX ADMIN — TORSEMIDE 40 MG: 20 TABLET ORAL at 11:01

## 2017-01-08 RX ADMIN — COLLAGENASE SANTYL: 250 OINTMENT TOPICAL at 11:01

## 2017-01-08 RX ADMIN — PANTOPRAZOLE SODIUM 40 MG: 40 TABLET, DELAYED RELEASE ORAL at 11:01

## 2017-01-08 RX ADMIN — ASPIRIN 325 MG ORAL TABLET 325 MG: 325 PILL ORAL at 09:01

## 2017-01-08 RX ADMIN — GABAPENTIN 300 MG: 300 CAPSULE ORAL at 01:01

## 2017-01-08 RX ADMIN — RAMIPRIL 5 MG: 2.5 CAPSULE ORAL at 11:01

## 2017-01-08 RX ADMIN — LACTOBACILLUS ACIDOPHILUS / LACTOBACILLUS BULGARICUS 1 EACH: 100 MILLION CFU STRENGTH GRANULES at 11:01

## 2017-01-08 NOTE — PLAN OF CARE
Problem: Fall Risk (Adult)  Goal: Absence of Falls  Patient will demonstrate the desired outcomes by discharge/transition of care.   Outcome: Ongoing (interventions implemented as appropriate)    01/08/17 0630   Fall Risk (Adult)   Absence of Falls making progress toward outcome

## 2017-01-08 NOTE — PLAN OF CARE
Problem: Patient Care Overview  Goal: Plan of Care Review  Outcome: Ongoing (interventions implemented as appropriate)    01/08/17 0638   Coping/Psychosocial   Plan Of Care Reviewed With patient

## 2017-01-09 ENCOUNTER — TELEPHONE (OUTPATIENT)
Dept: ORTHOPEDICS | Facility: CLINIC | Age: 72
End: 2017-01-09

## 2017-01-09 ENCOUNTER — OFFICE VISIT (OUTPATIENT)
Dept: INFECTIOUS DISEASES | Facility: CLINIC | Age: 72
End: 2017-01-09
Payer: MEDICARE

## 2017-01-09 VITALS
WEIGHT: 315 LBS | SYSTOLIC BLOOD PRESSURE: 130 MMHG | HEIGHT: 73 IN | BODY MASS INDEX: 41.75 KG/M2 | HEART RATE: 63 BPM | DIASTOLIC BLOOD PRESSURE: 73 MMHG | TEMPERATURE: 98 F

## 2017-01-09 DIAGNOSIS — L08.9: Primary | ICD-10-CM

## 2017-01-09 DIAGNOSIS — S82.402N TRAUMATIC TYPE III OPEN FRACTURE OF SHAFT OF LEFT TIBIA AND FIBULA WITH NONUNION: ICD-10-CM

## 2017-01-09 DIAGNOSIS — S82.202N TRAUMATIC TYPE III OPEN FRACTURE OF SHAFT OF LEFT TIBIA AND FIBULA WITH NONUNION: ICD-10-CM

## 2017-01-09 DIAGNOSIS — S90.812D: Primary | ICD-10-CM

## 2017-01-09 DIAGNOSIS — E66.01 MORBID OBESITY WITH BMI OF 40.0-44.9, ADULT: ICD-10-CM

## 2017-01-09 LAB
ALBUMIN SERPL BCP-MCNC: 3 G/DL
ALP SERPL-CCNC: 112 U/L
ALT SERPL W/O P-5'-P-CCNC: 19 U/L
ANION GAP SERPL CALC-SCNC: 9 MMOL/L
AST SERPL-CCNC: 17 U/L
BASOPHILS # BLD AUTO: 0.09 K/UL
BASOPHILS NFR BLD: 0.8 %
BILIRUB SERPL-MCNC: 0.4 MG/DL
BUN SERPL-MCNC: 14 MG/DL
CALCIUM SERPL-MCNC: 9.3 MG/DL
CHLORIDE SERPL-SCNC: 102 MMOL/L
CO2 SERPL-SCNC: 30 MMOL/L
CREAT SERPL-MCNC: 1.2 MG/DL
DIFFERENTIAL METHOD: ABNORMAL
EOSINOPHIL # BLD AUTO: 0.7 K/UL
EOSINOPHIL NFR BLD: 6.6 %
ERYTHROCYTE [DISTWIDTH] IN BLOOD BY AUTOMATED COUNT: 14.5 %
EST. GFR  (AFRICAN AMERICAN): >60 ML/MIN/1.73 M^2
EST. GFR  (NON AFRICAN AMERICAN): >60 ML/MIN/1.73 M^2
GLUCOSE SERPL-MCNC: 87 MG/DL
HCT VFR BLD AUTO: 34.4 %
HGB BLD-MCNC: 10.9 G/DL
LYMPHOCYTES # BLD AUTO: 2.2 K/UL
LYMPHOCYTES NFR BLD: 20.6 %
MCH RBC QN AUTO: 31.9 PG
MCHC RBC AUTO-ENTMCNC: 31.7 %
MCV RBC AUTO: 101 FL
MONOCYTES # BLD AUTO: 0.7 K/UL
MONOCYTES NFR BLD: 6.3 %
NEUTROPHILS # BLD AUTO: 7 K/UL
NEUTROPHILS NFR BLD: 65.7 %
PLATELET # BLD AUTO: 283 K/UL
PMV BLD AUTO: 11.3 FL
POTASSIUM SERPL-SCNC: 4.3 MMOL/L
PROT SERPL-MCNC: 7.1 G/DL
RBC # BLD AUTO: 3.42 M/UL
SODIUM SERPL-SCNC: 141 MMOL/L
WBC # BLD AUTO: 10.7 K/UL

## 2017-01-09 PROCEDURE — 99215 OFFICE O/P EST HI 40 MIN: CPT | Mod: S$GLB,,, | Performed by: INTERNAL MEDICINE

## 2017-01-09 PROCEDURE — 97110 THERAPEUTIC EXERCISES: CPT

## 2017-01-09 PROCEDURE — 3075F SYST BP GE 130 - 139MM HG: CPT | Mod: S$GLB,,, | Performed by: INTERNAL MEDICINE

## 2017-01-09 PROCEDURE — 1159F MED LIST DOCD IN RCRD: CPT | Mod: S$GLB,,, | Performed by: INTERNAL MEDICINE

## 2017-01-09 PROCEDURE — 11000004 HC SNF PRIVATE

## 2017-01-09 PROCEDURE — 97116 GAIT TRAINING THERAPY: CPT

## 2017-01-09 PROCEDURE — 85025 COMPLETE CBC W/AUTO DIFF WBC: CPT

## 2017-01-09 PROCEDURE — 3078F DIAST BP <80 MM HG: CPT | Mod: S$GLB,,, | Performed by: INTERNAL MEDICINE

## 2017-01-09 PROCEDURE — 25000003 PHARM REV CODE 250: Performed by: INTERNAL MEDICINE

## 2017-01-09 PROCEDURE — 80053 COMPREHEN METABOLIC PANEL: CPT

## 2017-01-09 PROCEDURE — 25000003 PHARM REV CODE 250: Performed by: STUDENT IN AN ORGANIZED HEALTH CARE EDUCATION/TRAINING PROGRAM

## 2017-01-09 PROCEDURE — 1157F ADVNC CARE PLAN IN RCRD: CPT | Mod: S$GLB,,, | Performed by: INTERNAL MEDICINE

## 2017-01-09 PROCEDURE — 1160F RVW MEDS BY RX/DR IN RCRD: CPT | Mod: S$GLB,,, | Performed by: INTERNAL MEDICINE

## 2017-01-09 PROCEDURE — 99999 PR PBB SHADOW E&M-EST. PATIENT-LVL III: CPT | Mod: PBBFAC,,, | Performed by: INTERNAL MEDICINE

## 2017-01-09 PROCEDURE — 25000003 PHARM REV CODE 250: Performed by: NURSE PRACTITIONER

## 2017-01-09 PROCEDURE — 63600175 PHARM REV CODE 636 W HCPCS: Performed by: NURSE PRACTITIONER

## 2017-01-09 PROCEDURE — 97530 THERAPEUTIC ACTIVITIES: CPT

## 2017-01-09 PROCEDURE — 1126F AMNT PAIN NOTED NONE PRSNT: CPT | Mod: S$GLB,,, | Performed by: INTERNAL MEDICINE

## 2017-01-09 PROCEDURE — 99499 UNLISTED E&M SERVICE: CPT | Mod: S$GLB,,, | Performed by: INTERNAL MEDICINE

## 2017-01-09 PROCEDURE — 36415 COLL VENOUS BLD VENIPUNCTURE: CPT

## 2017-01-09 RX ADMIN — ASPIRIN 325 MG ORAL TABLET 325 MG: 325 PILL ORAL at 09:01

## 2017-01-09 RX ADMIN — PIPERACILLIN SODIUM,TAZOBACTAM SODIUM 4.5 G: 4; .5 INJECTION, POWDER, FOR SOLUTION INTRAVENOUS at 11:01

## 2017-01-09 RX ADMIN — TORSEMIDE 40 MG: 20 TABLET ORAL at 08:01

## 2017-01-09 RX ADMIN — LACTOBACILLUS ACIDOPHILUS / LACTOBACILLUS BULGARICUS 1 EACH: 100 MILLION CFU STRENGTH GRANULES at 09:01

## 2017-01-09 RX ADMIN — Medication 10 ML: at 06:01

## 2017-01-09 RX ADMIN — VENLAFAXINE 150 MG: 37.5 TABLET ORAL at 09:01

## 2017-01-09 RX ADMIN — OXYCODONE HYDROCHLORIDE AND ACETAMINOPHEN 1 TABLET: 10; 325 TABLET ORAL at 09:01

## 2017-01-09 RX ADMIN — TAMSULOSIN HYDROCHLORIDE 0.4 MG: 0.4 CAPSULE ORAL at 08:01

## 2017-01-09 RX ADMIN — METOPROLOL TARTRATE 25 MG: 25 TABLET ORAL at 08:01

## 2017-01-09 RX ADMIN — COLLAGENASE SANTYL: 250 OINTMENT TOPICAL at 01:01

## 2017-01-09 RX ADMIN — RAMIPRIL 5 MG: 2.5 CAPSULE ORAL at 08:01

## 2017-01-09 RX ADMIN — Medication 10 ML: at 12:01

## 2017-01-09 RX ADMIN — GABAPENTIN 300 MG: 300 CAPSULE ORAL at 06:01

## 2017-01-09 RX ADMIN — STANDARDIZED SENNA CONCENTRATE AND DOCUSATE SODIUM 1 TABLET: 8.6; 5 TABLET, FILM COATED ORAL at 08:01

## 2017-01-09 RX ADMIN — STANDARDIZED SENNA CONCENTRATE AND DOCUSATE SODIUM 1 TABLET: 8.6; 5 TABLET, FILM COATED ORAL at 09:01

## 2017-01-09 RX ADMIN — DICLOFENAC 2 G: 10 GEL TOPICAL at 08:01

## 2017-01-09 RX ADMIN — ASPIRIN 325 MG ORAL TABLET 325 MG: 325 PILL ORAL at 08:01

## 2017-01-09 RX ADMIN — METOPROLOL TARTRATE 25 MG: 25 TABLET ORAL at 09:01

## 2017-01-09 RX ADMIN — GABAPENTIN 300 MG: 300 CAPSULE ORAL at 09:01

## 2017-01-09 RX ADMIN — POLYETHYLENE GLYCOL 3350 17 G: 17 POWDER, FOR SOLUTION ORAL at 08:01

## 2017-01-09 RX ADMIN — LACTOBACILLUS ACIDOPHILUS / LACTOBACILLUS BULGARICUS 1 EACH: 100 MILLION CFU STRENGTH GRANULES at 08:01

## 2017-01-09 RX ADMIN — GABAPENTIN 300 MG: 300 CAPSULE ORAL at 01:01

## 2017-01-09 RX ADMIN — ATORVASTATIN CALCIUM 40 MG: 20 TABLET, FILM COATED ORAL at 08:01

## 2017-01-09 RX ADMIN — PANTOPRAZOLE SODIUM 40 MG: 40 TABLET, DELAYED RELEASE ORAL at 08:01

## 2017-01-09 RX ADMIN — PIPERACILLIN SODIUM,TAZOBACTAM SODIUM 4.5 G: 4; .5 INJECTION, POWDER, FOR SOLUTION INTRAVENOUS at 02:01

## 2017-01-09 RX ADMIN — VENLAFAXINE 150 MG: 37.5 TABLET ORAL at 08:01

## 2017-01-09 RX ADMIN — PIPERACILLIN SODIUM,TAZOBACTAM SODIUM 4.5 G: 4; .5 INJECTION, POWDER, FOR SOLUTION INTRAVENOUS at 06:01

## 2017-01-09 NOTE — PROGRESS NOTES
TO AND FROM INFECTIOUS DISEASE CLINIC BY RELAINT TRANSPORT SERVICE ACCOMPANIED BY TRANSPORTERS. AWAKE AND ALERT ORIENTED X4 . NO COMPLAINT OF DISCOMFORT VERBALIZED.SITTING IN WHEELCHAIR EATING LUNCH.

## 2017-01-09 NOTE — PLAN OF CARE
Problem: Occupational Therapy Goal  Goal: Occupational Therapy Goal  Goals to be met by: 14 days     Patient will increase functional independence with ADLs by performing:    LE Dressing with Set-up Assistance and Assistive Devices as needed.  Toileting from bedside commode with Minimal Assistance for hygiene and clothing management.   Bathing from edge of bed with Minimal Assistance.  Stand pivot transfers with Modified [place].  Toilet transfer to bedside commode with Modified [place].   Outcome: Ongoing (interventions implemented as appropriate)  Progressing. NIK Munson  1/9/2017

## 2017-01-09 NOTE — PT/OT/SLP PROGRESS
Occupational Therapy  Treatment    Janusz Montgomery Jr.   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    Pain Ratin/10                   Objective:  Patient found with:  (in w/c) with ext fixator intact    Functional Mobility:  Bed Mobility:   Supine to sit: Modified Independent   Sit to supine: Modified Independent   Rolling: Modified Independent   Scooting: Modified Independent    Functional Status:  Bed/Chair/WC: Supervision or Setup (w/c to and from mat no AD)    Balance:   Static Sit: Normal: Patient able to maintain steady balance without handhold support.  Dynamic Sit: Good: Patient accepts moderate challenge; able to maintain balance while picking object off floor.    OT Exercises: UE Ergometer 15 min  Rickshaw 30# 25 x4  Lat pull downs 35# 15 x3  Rowing 35# 25x1;15x1;10x2    Additional Treatment:  Biceps 20# 10 x 2  Pt scooted along and around EOM to left and right  Pt performed 10 reps x 2 sets bridging with LLE on red theraball  Crunches with LE extended on mat 10 x 3  Mod I with w/c mobiltiy and management of parts for all t/f's and transport    Patient left up in chair     ASSESSMENT:  Pt tolerated tx and demonstrated increase functional endurance and UE strength. Pt will cont to benefit from OT to increase functional indep and safety    Rehab identified problem list/impairments: Rehab identified problem list/impairments: weakness, impaired endurance, impaired self care skills, impaired balance, gait instability, impaired functional mobilty, decreased  coordination, decreased lower extremity function, decreased ROM, edema, orthopedic precautions, impaired skin, pain    Rehab potential is good    Activity tolerance: Good    Discharge recommendations: home with home health     Barriers to discharge: Barriers to Discharge: Decreased caregiver support     Equipment recommendations: none     GOALS:   Occupational Therapy Goals        Problem: Occupational Therapy Goal    Goal Priority Disciplines Outcome Interventions   Occupational Therapy Goal     OT, PT/OT Ongoing (interventions implemented as appropriate)    Description:  Goals to be met by: 14 days     Patient will increase functional independence with ADLs by performing:    LE Dressing with Set-up Assistance and Assistive Devices as needed.  Toileting from bedside commode with Minimal Assistance for hygiene and clothing management.   Bathing from  edge of bed with Minimal Assistance.  Stand pivot transfers with Modified [place].  Toilet transfer to bedside commode with Modified [place].                Plan:  Patient to be seen 5 x/week to address the above listed problems via self-care/home management, therapeutic activities, therapeutic exercises  Plan of Care expires:    Plan of Care reviewed with: patient    NIK Munson  01/09/2017
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
## 2017-01-09 NOTE — PLAN OF CARE
Problem: Patient Care Overview  Goal: Plan of Care Review  Outcome: Ongoing (interventions implemented as appropriate)  AFEBRILE.FALL PRECAUTIONS MAINTAINED NO INJURIES NOTED.NO PRESSURE ULCERS NOTED.

## 2017-01-09 NOTE — TELEPHONE ENCOUNTER
----- Message from Alen Cadena sent at 1/9/2017 12:22 PM CST -----  Contact: self   Returning your call

## 2017-01-09 NOTE — TELEPHONE ENCOUNTER
----- Message from Michael Joseph Casale, MD sent at 1/6/2017  2:08 PM CST -----  Marcy Mast, can you please move this patients appointment up 2 weeks (around 1/16). Thank you!

## 2017-01-09 NOTE — PLAN OF CARE
Problem: Physical Therapy Goal  Goal: Physical Therapy Goal  Goals to be met by: 2 weeks (tentative, pending medical progress)     Patient will increase functional independence with mobility by performin. Supine to sit with Modified [place] = not met  2. Sit to supine with Modified [place]= not met  3. Sit to stand transfer with Supervision and maintaining NWB LLE= not met  4. Bed to chair transfer with Supervision using Rolling Walker and maintaining NWB LLE= not met  5. Gait x 25 feet with Stand-by Assistance using Rolling Walker. and maintaining NWB LLE= not met    6. Wheelchair propulsion x150 feet with Modified [place] using bilateral upper extremities= not met    7. Lower extremity exercise program x20-30 reps per handout, with assistance as needed and gym therex= met   Outcome: Ongoing (interventions implemented as appropriate)  Goals remain appropriate.

## 2017-01-09 NOTE — PT/OT/SLP PROGRESS
"Physical Therapy  Treatment    Janusz Montgomery Jr.   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    PT Received On: 17                     Billable Minutes:  Gait [id number] and Therapeutic Exercise 25    Treatment Type: Treatment  PT/PTA: PTA     PTA Visit Number: 3       General Precautions: Standard, fall  Orthopedic Precautions: LLE non weight bearing   Braces:  (external fixator LLE)      Subjective:  "I'm back."    Pain Ratin/10           Pain Addressed: Pre-medicate for activity  Pain Rating Post-Intervention: 0/10    Objective:  Patient found with:  (supine in bed)     Functional Mobility:  Bed Mobility :   Supine <> sit with (S)/mod I    Transfers:  Bed/Chair/WC: Supervision or Setup  SPT from bed > w/c with SBA  Sit <> stand from w/c with RW SBA    Gait:  Distance Walk: pt ambulated x 42 feet and 35 feet with NWB LLE, RW and CGA for balance and safety.  w/c in tow  Walk: Minimal Contact Assistance  Assistive Device: rolling walker  Gait Deviation(s): decreased sonali;decreased velocity of limb motion;decreased step length;decreased stride length;decreased swing-to-stance ratio;decreased toe-to-floor clearance;decreased weight-shifting ability    Wheelchair: Exception (Household Locomotion)  Distance Wheelchair: 150 feet with RLE and BUEs       Balance:   Static Sit: Normal: Patient able to maintain steady balance without handhold support.  Dynamic Sit:  Good: Patient accepts moderate challenge; able to maintain balance while picking object off floor.  Static Stand: Fair: Patient able to maintain balance with handhold support; may require occasional minimal assistance.  Dynamic stand: Fair: Patient accepts minimal challenge; able to maintain balance while turning head/trunk.    PT Exercises:   Ankle pumps 30  Quad sets 30  Glut sets 30  Hip flexion 30  Long arc quads 30    Additional Treatment:  LBE x 15 minutes with RLE only " for strengthening.    Patient left up in chair with call button in reach.    Assessment:  Janusz Montgomery Jr. is a 71 y.o. 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  Pt tolerated therapy well with increased gait distance x 42 feet the most with RW and CGA.  Pt would continue to benefit from PT to improve his mobility.  Goals remain appropriate.    Rehab identified problem list/impairments: Rehab identified problem list/impairments: weakness, impaired endurance, impaired self care skills, impaired functional mobilty, gait instability, impaired balance, decreased coordination, decreased upper extremity function, decreased lower extremity function, decreased safety awareness, pain, decreased ROM, impaired coordination, orthopedic precautions    Rehab potential is fair.    Activity tolerance: Good    Discharge recommendations: Discharge Facility/Level Of Care Needs: home with home health     Barriers to discharge: Barriers to Discharge: Decreased caregiver support    Equipment recommendations: Equipment Needed After Discharge: none     GOALS:   Physical Therapy Goals        Problem: Physical Therapy Goal    Goal Priority Disciplines Outcome Goal Variances Interventions   Physical Therapy Goal     PT/OT, PT Ongoing (interventions implemented as appropriate)     Description:  Goals to be met by: 2 weeks (tentative, pending medical progress)     Patient will increase functional independence with mobility by performin. Supine to sit with Modified [place] = not met  2. Sit to supine with Modified [place]= not met  3. Sit to stand transfer with Supervision and maintaining NWB LLE= not met  4. Bed to chair transfer with Supervision using Rolling Walker and maintaining NWB LLE= not met  5. Gait  x 25 feet with Stand-by Assistance using Rolling Walker.  and maintaining NWB LLE= not met    6. Wheelchair propulsion x150 feet with Modified  [place] using bilateral upper extremities= not met    7. Lower extremity exercise program x20-30 reps per handout, with assistance as needed and gym therex=  met                   PLAN:    Patient to be seen 5 x/week  to address the above listed problems via gait training, therapeutic activities, therapeutic exercises, wheelchair management/training  Plan of Care expires:    Plan of Care reviewed with: patient    Jevon Cannon, PTA  01/09/2017

## 2017-01-09 NOTE — TREATMENT PLAN
Rehab Services' DME recommendations    Janusz Montgomery Jr.  MRN: [id number]    [x]  No DME needed        [x] Home health PT and OT      Jevon Cannon, PTA 1/9/2017

## 2017-01-10 PROCEDURE — 25000003 PHARM REV CODE 250: Performed by: STUDENT IN AN ORGANIZED HEALTH CARE EDUCATION/TRAINING PROGRAM

## 2017-01-10 PROCEDURE — 25000003 PHARM REV CODE 250: Performed by: NURSE PRACTITIONER

## 2017-01-10 PROCEDURE — 99308 SBSQ NF CARE LOW MDM 20: CPT | Mod: ,,, | Performed by: NURSE PRACTITIONER

## 2017-01-10 PROCEDURE — 63600175 PHARM REV CODE 636 W HCPCS: Performed by: NURSE PRACTITIONER

## 2017-01-10 PROCEDURE — 99900058 HC 022 PAID UNDER SNF PPS

## 2017-01-10 PROCEDURE — 97110 THERAPEUTIC EXERCISES: CPT

## 2017-01-10 PROCEDURE — 97530 THERAPEUTIC ACTIVITIES: CPT

## 2017-01-10 PROCEDURE — 97116 GAIT TRAINING THERAPY: CPT

## 2017-01-10 PROCEDURE — 25000003 PHARM REV CODE 250: Performed by: INTERNAL MEDICINE

## 2017-01-10 PROCEDURE — 11000004 HC SNF PRIVATE

## 2017-01-10 RX ADMIN — ASPIRIN 325 MG ORAL TABLET 325 MG: 325 PILL ORAL at 09:01

## 2017-01-10 RX ADMIN — Medication 10 ML: at 06:01

## 2017-01-10 RX ADMIN — VENLAFAXINE 150 MG: 37.5 TABLET ORAL at 08:01

## 2017-01-10 RX ADMIN — TORSEMIDE 40 MG: 20 TABLET ORAL at 08:01

## 2017-01-10 RX ADMIN — PANTOPRAZOLE SODIUM 40 MG: 40 TABLET, DELAYED RELEASE ORAL at 08:01

## 2017-01-10 RX ADMIN — DICLOFENAC 2 G: 10 GEL TOPICAL at 08:01

## 2017-01-10 RX ADMIN — PIPERACILLIN SODIUM,TAZOBACTAM SODIUM 4.5 G: 4; .5 INJECTION, POWDER, FOR SOLUTION INTRAVENOUS at 02:01

## 2017-01-10 RX ADMIN — LACTOBACILLUS ACIDOPHILUS / LACTOBACILLUS BULGARICUS 1 EACH: 100 MILLION CFU STRENGTH GRANULES at 09:01

## 2017-01-10 RX ADMIN — OXYCODONE HYDROCHLORIDE AND ACETAMINOPHEN 1 TABLET: 10; 325 TABLET ORAL at 09:01

## 2017-01-10 RX ADMIN — PIPERACILLIN SODIUM,TAZOBACTAM SODIUM 4.5 G: 4; .5 INJECTION, POWDER, FOR SOLUTION INTRAVENOUS at 10:01

## 2017-01-10 RX ADMIN — ASPIRIN 325 MG ORAL TABLET 325 MG: 325 PILL ORAL at 08:01

## 2017-01-10 RX ADMIN — LACTOBACILLUS ACIDOPHILUS / LACTOBACILLUS BULGARICUS 1 EACH: 100 MILLION CFU STRENGTH GRANULES at 08:01

## 2017-01-10 RX ADMIN — GABAPENTIN 300 MG: 300 CAPSULE ORAL at 09:01

## 2017-01-10 RX ADMIN — COLLAGENASE SANTYL: 250 OINTMENT TOPICAL at 09:01

## 2017-01-10 RX ADMIN — GABAPENTIN 300 MG: 300 CAPSULE ORAL at 02:01

## 2017-01-10 RX ADMIN — ATORVASTATIN CALCIUM 40 MG: 20 TABLET, FILM COATED ORAL at 08:01

## 2017-01-10 RX ADMIN — POLYETHYLENE GLYCOL 3350 17 G: 17 POWDER, FOR SOLUTION ORAL at 08:01

## 2017-01-10 RX ADMIN — TAMSULOSIN HYDROCHLORIDE 0.4 MG: 0.4 CAPSULE ORAL at 08:01

## 2017-01-10 RX ADMIN — METOPROLOL TARTRATE 25 MG: 25 TABLET ORAL at 08:01

## 2017-01-10 RX ADMIN — METOPROLOL TARTRATE 25 MG: 25 TABLET ORAL at 09:01

## 2017-01-10 RX ADMIN — STANDARDIZED SENNA CONCENTRATE AND DOCUSATE SODIUM 1 TABLET: 8.6; 5 TABLET, FILM COATED ORAL at 09:01

## 2017-01-10 RX ADMIN — RAMIPRIL 5 MG: 2.5 CAPSULE ORAL at 08:01

## 2017-01-10 RX ADMIN — VENLAFAXINE 150 MG: 37.5 TABLET ORAL at 09:01

## 2017-01-10 RX ADMIN — OXYCODONE HYDROCHLORIDE AND ACETAMINOPHEN 1 TABLET: 10; 325 TABLET ORAL at 01:01

## 2017-01-10 RX ADMIN — PIPERACILLIN SODIUM,TAZOBACTAM SODIUM 4.5 G: 4; .5 INJECTION, POWDER, FOR SOLUTION INTRAVENOUS at 06:01

## 2017-01-10 RX ADMIN — Medication 10 ML: at 12:01

## 2017-01-10 RX ADMIN — STANDARDIZED SENNA CONCENTRATE AND DOCUSATE SODIUM 1 TABLET: 8.6; 5 TABLET, FILM COATED ORAL at 08:01

## 2017-01-10 RX ADMIN — GABAPENTIN 300 MG: 300 CAPSULE ORAL at 06:01

## 2017-01-10 NOTE — PLAN OF CARE
Problem: Physical Therapy Goal  Goal: Physical Therapy Goal  Goals to be met by: 2 weeks (tentative, pending medical progress)     Patient will increase functional independence with mobility by performin. Supine to sit with Modified [place] = not met  2. Sit to supine with Modified [place]= not met  3. Sit to stand transfer with Supervision and maintaining NWB LLE= not met  4. Bed to chair transfer with Supervision using Rolling Walker and maintaining NWB LLE= not met  5. Gait x 25 feet with Stand-by Assistance using Rolling Walker. and maintaining NWB LLE= not met    6. Wheelchair propulsion x150 feet with Modified [place] using bilateral upper extremities= not met    7. Lower extremity exercise program x20-30 reps per handout, with assistance as needed and gym therex= met   Outcome: Ongoing (interventions implemented as appropriate)  Goals remain appropriate.

## 2017-01-10 NOTE — PLAN OF CARE
Problem: Occupational Therapy Goal  Goal: Occupational Therapy Goal  Goals to be met by: 14 days     Patient will increase functional independence with ADLs by performing:    LE Dressing with Set-up Assistance and Assistive Devices as needed.  Toileting from bedside commode with Minimal Assistance for hygiene and clothing management.   Bathing from edge of bed with Minimal Assistance.  Stand pivot transfers with Modified [place].  Toilet transfer to bedside commode with Modified [place].   Outcome: Ongoing (interventions implemented as appropriate)  Increased indep with t/f's. NIK Munson  1/10/2017
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
## 2017-01-10 NOTE — PLAN OF CARE
Problem: Patient Care Overview  Goal: Plan of Care Review  Outcome: Ongoing (interventions implemented as appropriate)  PAIN CONTROLLED BY PERCOCET.AFEBRILE CONTINUES IV ANTIBIOTICS.FALL PRECAUTIONS MAINTAINED NO INJURIES NOTED.NO PRESSURE ULCERS NOTED.

## 2017-01-10 NOTE — PT/OT/SLP PROGRESS
"Physical Therapy  Treatment    Janusz Montgomery Jr.   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    PT Received On: 01/10/17                     Billable Minutes:  Gait [id number], Therapeutic Activity 8 and Therapeutic Exercise 30    Treatment Type: Treatment  PT/PTA: PTA     PTA Visit Number: 4       General Precautions: Standard, fall  Orthopedic Precautions: LLE non weight bearing   Braces:  (external fixator LLE)         Subjective:  "I'm ready."    Pain Ratin/10           Pain Addressed: Pre-medicate for activity  Pain Rating Post-Intervention: 0/10    Objective:  Patient found with:  (seated in w/c)     Functional Mobility:      Transfers:  Bed/Chair/WC: Supervision or Setup  Sit <> stand from w/c with RW and SBA    Gait:  Distance Walk: pt ambulated x 40 feet and 45 feet with NWB LLE, RW and CGA for balance and safety.  w/c in tow  Walk: Minimal Contact Assistance  Assistive Device: rolling walker  Gait Deviation(s): decreased sonali;decreased toe-to-floor clearance;decreased weight-shifting ability;decreased velocity of limb motion;decreased step length;decreased stride length;decreased swing-to-stance ratio    Wheelchair: Exception (Household Locomotion)/mod I  Distance Wheelchair: 150 feet with BUEs, RLE and (S)/mod I      Balance:   Static Sit: Normal: Patient able to maintain steady balance without handhold support.  Dynamic Sit:  Normal: Patient accepts maximal challenge and can shift weight easily within full range in all directions.  Static Stand: Fair: Patient able to maintain balance with handhold support; may require occasional minimal assistance.  Dynamic stand: Fair: Patient accepts minimal challenge; able to maintain balance while turning head/trunk.    PT Exercises:   Ankle pumps 30  Quad sets 30  Glut sets 30  Hip flexion 30  Long arc quads 30  ABduction 30  ADduction 30    Additional Treatment:  LBE x 15 minutes " with RLE for strengthening  Rows/presses x 100 reps    Patient left up in chair with call button in reach.    Assessment:  Janusz Montgomery Jr. is a 71 y.o. 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    Activity tolerance: Good    Discharge recommendations: Discharge Facility/Level Of Care Needs: home with home health     Barriers to discharge: Barriers to Discharge: Decreased caregiver support    Equipment recommendations: Equipment Needed After Discharge: none     GOALS:   Physical Therapy Goals        Problem: Physical Therapy Goal    Goal Priority Disciplines Outcome Goal Variances Interventions   Physical Therapy Goal     PT/OT, PT Ongoing (interventions implemented as appropriate)     Description:  Goals to be met by: 2 weeks (tentative, pending medical progress)     Patient will increase functional independence with mobility by performin. Supine to sit with Modified [place] = not met  2. Sit to supine with Modified [place]= not met  3. Sit to stand transfer with Supervision and maintaining NWB LLE= not met  4. Bed to chair transfer with Supervision using Rolling Walker and maintaining NWB LLE= not met  5. Gait  x 25 feet with Stand-by Assistance using Rolling Walker.  and maintaining NWB LLE= not met    6. Wheelchair propulsion x150 feet with Modified [place] using bilateral upper extremities= not met    7. Lower extremity exercise program x20-30 reps per handout, with assistance as needed and gym  therex=  met                   PLAN:    Patient to be seen 5 x/week  to address the above listed problems via gait training, therapeutic activities, therapeutic exercises, wheelchair management/training  Plan of Care expires:    Plan of Care reviewed with: patient    Jevon CAMARILLO Cannon, PTA  01/10/2017

## 2017-01-10 NOTE — PT/OT/SLP PROGRESS
Occupational Therapy  Treatment    Janusz Montgomery Jr.   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    Pain Ratin/10              Pain Rating Post-Intervention: 0/10    Objective:  Patient found with:  (supine in bed with ext fixator)    Functional Mobility:  Bed Mobility:   Supine to sit: Modified Independent    Scooting: Modified Independent    Functional Status:  Dressing - Lower: setup with shoe  Bed/Chair/WC: Supervision or Setup (bed to w/c no AD)    Balance:   Static Sit: Normal: Patient able to maintain steady balance without handhold support.  Dynamic Sit: Good: Patient accepts moderate challenge; able to maintain balance while picking object off floor.     OT Exercises: blue theraband for shoulder ext rotation with adduction 10 x 3  35# lat pulldown with tricep press 15 x 3  Chest press with blue theraband behind w/c with 6# dumbells 10 x 1; then with band and 5# dowel 10 x 2  Shoulder press with 5# dowel 15 x 2  Restbreaks needed    Additional Treatment:  Pt propels w/c and manages parts with mod I from room to and from gym    Patient left up in chair with call button in reach and eating lunch    ASSESSMENT:  Pt tolerated tx and cont to increase UE strength to increase functional indep and standing balance with RW    Rehab identified problem list/impairments: Rehab identified problem list/impairments: weakness, impaired endurance, impaired self care skills, decreased lower extremity function, decreased ROM, edema, orthopedic precautions, impaired skin, impaired  balance, gait instability, impaired functional mobilty    Rehab potential is good    Activity tolerance: Good    Discharge recommendations: home with home health     Barriers to discharge: Barriers to Discharge: Decreased caregiver support     Equipment recommendations: none     GOALS:   Occupational Therapy Goals        Problem: Occupational Therapy Goal    Goal Priority Disciplines Outcome Interventions   Occupational Therapy Goal     OT, PT/OT Ongoing (interventions implemented as appropriate)    Description:  Goals to be met by: 14 days     Patient will increase functional independence with ADLs by performing:    LE Dressing with Set-up Assistance and Assistive Devices as needed.  Toileting from bedside commode with Minimal Assistance for hygiene and clothing management.   Bathing from  edge of bed with Minimal Assistance.  Stand pivot transfers with Modified [place].  Toilet transfer to bedside commode with Modified [place].                Plan:  Patient to be seen 5 x/week to address the above listed problems via self-care/home management, therapeutic activities, therapeutic exercises  Plan of Care reviewed with: patient    NIK Munson  01/10/2017

## 2017-01-11 PROCEDURE — 97530 THERAPEUTIC ACTIVITIES: CPT

## 2017-01-11 PROCEDURE — 97116 GAIT TRAINING THERAPY: CPT

## 2017-01-11 PROCEDURE — 11000004 HC SNF PRIVATE

## 2017-01-11 PROCEDURE — 25000003 PHARM REV CODE 250: Performed by: INTERNAL MEDICINE

## 2017-01-11 PROCEDURE — 97110 THERAPEUTIC EXERCISES: CPT

## 2017-01-11 PROCEDURE — 25000003 PHARM REV CODE 250: Performed by: NURSE PRACTITIONER

## 2017-01-11 PROCEDURE — 25000003 PHARM REV CODE 250: Performed by: STUDENT IN AN ORGANIZED HEALTH CARE EDUCATION/TRAINING PROGRAM

## 2017-01-11 RX ORDER — ZINC SULFATE 50(220)MG
220 CAPSULE ORAL DAILY
Status: DISCONTINUED | OUTPATIENT
Start: 2017-01-12 | End: 2017-01-25 | Stop reason: HOSPADM

## 2017-01-11 RX ADMIN — GABAPENTIN 300 MG: 300 CAPSULE ORAL at 05:01

## 2017-01-11 RX ADMIN — POLYETHYLENE GLYCOL 3350 17 G: 17 POWDER, FOR SOLUTION ORAL at 09:01

## 2017-01-11 RX ADMIN — GABAPENTIN 300 MG: 300 CAPSULE ORAL at 09:01

## 2017-01-11 RX ADMIN — PIPERACILLIN SODIUM,TAZOBACTAM SODIUM 4.5 G: 4; .5 INJECTION, POWDER, FOR SOLUTION INTRAVENOUS at 03:01

## 2017-01-11 RX ADMIN — TORSEMIDE 40 MG: 20 TABLET ORAL at 09:01

## 2017-01-11 RX ADMIN — OXYCODONE HYDROCHLORIDE AND ACETAMINOPHEN 1 TABLET: 10; 325 TABLET ORAL at 04:01

## 2017-01-11 RX ADMIN — LACTOBACILLUS ACIDOPHILUS / LACTOBACILLUS BULGARICUS 1 EACH: 100 MILLION CFU STRENGTH GRANULES at 09:01

## 2017-01-11 RX ADMIN — TAMSULOSIN HYDROCHLORIDE 0.4 MG: 0.4 CAPSULE ORAL at 09:01

## 2017-01-11 RX ADMIN — Medication 10 ML: at 05:01

## 2017-01-11 RX ADMIN — Medication 10 ML: at 12:01

## 2017-01-11 RX ADMIN — METOPROLOL TARTRATE 25 MG: 25 TABLET ORAL at 09:01

## 2017-01-11 RX ADMIN — ASPIRIN 325 MG ORAL TABLET 325 MG: 325 PILL ORAL at 09:01

## 2017-01-11 RX ADMIN — GABAPENTIN 300 MG: 300 CAPSULE ORAL at 03:01

## 2017-01-11 RX ADMIN — VENLAFAXINE 150 MG: 37.5 TABLET ORAL at 09:01

## 2017-01-11 RX ADMIN — Medication 10 ML: at 07:01

## 2017-01-11 RX ADMIN — ATORVASTATIN CALCIUM 40 MG: 20 TABLET, FILM COATED ORAL at 09:01

## 2017-01-11 RX ADMIN — PIPERACILLIN SODIUM,TAZOBACTAM SODIUM 4.5 G: 4; .5 INJECTION, POWDER, FOR SOLUTION INTRAVENOUS at 05:01

## 2017-01-11 RX ADMIN — STANDARDIZED SENNA CONCENTRATE AND DOCUSATE SODIUM 1 TABLET: 8.6; 5 TABLET, FILM COATED ORAL at 09:01

## 2017-01-11 RX ADMIN — RAMIPRIL 5 MG: 2.5 CAPSULE ORAL at 09:01

## 2017-01-11 RX ADMIN — PIPERACILLIN SODIUM,TAZOBACTAM SODIUM 4.5 G: 4; .5 INJECTION, POWDER, FOR SOLUTION INTRAVENOUS at 09:01

## 2017-01-11 RX ADMIN — PANTOPRAZOLE SODIUM 40 MG: 40 TABLET, DELAYED RELEASE ORAL at 10:01

## 2017-01-11 RX ADMIN — DICLOFENAC 2 G: 10 GEL TOPICAL at 09:01

## 2017-01-11 RX ADMIN — HYPROMELLOSE 2910 1 DROP: 5 SOLUTION OPHTHALMIC at 09:01

## 2017-01-11 RX ADMIN — COLLAGENASE SANTYL: 250 OINTMENT TOPICAL at 09:01

## 2017-01-11 RX ADMIN — OXYCODONE HYDROCHLORIDE AND ACETAMINOPHEN 1 TABLET: 10; 325 TABLET ORAL at 09:01

## 2017-01-11 NOTE — PLAN OF CARE
Problem: Patient Care Overview  Goal: Plan of Care Review  Outcome: Revised  AAO X4. Respirations even and unlabored. No distress noted. Afebrile with recorded temp of 97.5 orally. External fixator to left lower leg. No redness, warmth or drainage noted. Both heels elevated off bed with pillows. Stage IV to left heel. Right upper arm PICC clean, dry and intact. Antibiotic infusing at this time. C/o pain and medicated as ordered. Instructed to call for assistance to BSCC or OOB. Pain and safety monitored every two hours.

## 2017-01-11 NOTE — PT/OT/SLP PROGRESS
Physical Therapy  Treatment    Janusz Montgomery Jr.   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    Pain Ratin/10    Objective:    Pt found supine in bed.    Functional Mobility:  Bed Mobility :   Supine to sit: Modified Independent on bed   Sit to supine: Modified Independent on bed    Bed/Chair/WC: Supervision or Setup   SQPT w/c<>EOB w/o AD w/ SPV for safety   Sit<>stand to/from w/c (x2 trials) w/ RW and close SBA for safety    Distance Walk: 2 trials (41ft and 40ft) w/ RW and CGA/close SBA for safety, vc's to slow down for stability, NWB LLE   Walk: Minimal Contact Assistance   Assistive Device: rolling walker   Gait Deviation(s): decreased toe-to-floor clearance    Distance Wheelchair: 150ft w/ BUE and RLE [place]     Additional Treatment:  Seated LBE RLE x15min   Seated UBE x10min   Seated [place] BUE 4x25 reps w/ 20#    Patient left supine with call button in reach.    Assessment:  Pt saúl session well w/ good participation. Pt requires some cueing t/o session for safety awareness during ambulation and transfers. Pt is able to maintain NWB LLE t/o session w/o cueing. Pt will continue PT POC.    Rehab identified problem list/impairments: Rehab identified problem list/impairments: weakness, impaired endurance, impaired self care skills, impaired functional mobilty, gait instability, impaired balance, decreased lower extremity function, decreased upper extremity function, pain, orthopedic precautions    Rehab potential is good.    Activity tolerance: Good    Discharge recommendations: Discharge  Facility/Level Of Care Needs: home with home health     Barriers to discharge: Barriers to Discharge: Decreased caregiver support    Equipment recommendations: Equipment Needed After Discharge: none     GOALS:   Physical Therapy Goals        Problem: Physical Therapy Goal    Goal Priority Disciplines Outcome Goal Variances Interventions   Physical Therapy Goal     PT/OT, PT Ongoing (interventions implemented as appropriate)     Description:  Goals to be met by: 2 weeks (tentative, pending medical progress)     Patient will increase functional independence with mobility by performin. Supine to sit with Modified [place] = not met  2. Sit to supine with Modified [place]= not met  3. Sit to stand transfer with Supervision and maintaining NWB LLE= not met  4. Bed to chair transfer with Supervision using Rolling Walker and maintaining NWB LLE= not met  5. Gait  x 25 feet with Stand-by Assistance using Rolling Walker.  and maintaining NWB LLE= not met    6. Wheelchair propulsion x150 feet with Modified [place] using bilateral upper extremities= not met    7. Lower extremity exercise program x20-30 reps per handout, with assistance as needed and gym therex=  met                   PLAN:    Patient to be seen 5 x/week  to address the above listed problems via gait training, therapeutic activities, therapeutic exercises, wheelchair management/training  Plan of Care expires:    Plan of Care reviewed with: patient    Mariluz M Paul, PT  2017

## 2017-01-11 NOTE — PROGRESS NOTES
The left heel pressure injury is off-loaded on pillows.  The wound bed (12:00) has ~ 30% slough with scattered slough throughout the wound bed, pink/moist, wound edges callused, loose.  The adriana-wound is pink/dry blanches well.  Will continue enzymatic debridement with Santyl daily.  Wound bed remains tender near slough area.  Nursing to continue care daily.         01/11/17 1000       Pressure Ulcer 12/28/16 Left heel Stage IV   Date First Assessed: 12/28/16   Pressure Ulcer Present on Admission: (c) yes  Side: Left  Location: heel  Staging: Stage IV   Staging Unstageable   Healing Pressure Ulcer yes   Pressure Ulcer Risk Factors mobility;moisture;shear/friction;nutrition   Dressing Appearance dried drainage;intact   Drainage Amount scant   Drainage Characteristics/Odor serosanguineous   Appearance slough;moist;pink   Periwound Area normal skin tone   Wound Edges callused;open   Wound Length (cm) 5   Wound Width (cm) 3   Cleansed W/ sterile normal saline   Interventions enzymatic debriding agent (Santyl)   Dressing Dressing changed;gauze;telfa island dressing  (moistened gauze)   Dressing Change Due 01/12/17   Consent was received from the patient for the wound photograph.

## 2017-01-11 NOTE — PLAN OF CARE
Problem: Physical Therapy Goal  Goal: Physical Therapy Goal  Goals to be met by: 2 weeks (tentative, pending medical progress)     Patient will increase functional independence with mobility by performin. Supine to sit with Modified [place] = not met  2. Sit to supine with Modified [place]= not met  3. Sit to stand transfer with Supervision and maintaining NWB LLE= not met  4. Bed to chair transfer with Supervision using Rolling Walker and maintaining NWB LLE= not met  5. Gait x 25 feet with Stand-by Assistance using Rolling Walker. and maintaining NWB LLE= not met    6. Wheelchair propulsion x150 feet with Modified [place] using bilateral upper extremities= not met    7. Lower extremity exercise program x20-30 reps per handout, with assistance as needed and gym therex= met   LTGs remain appropriate. Pt will continue PT POC.     Mariluz Miller, PT  2017

## 2017-01-11 NOTE — PROGRESS NOTES
Ortho  Progress Note    Janusz Montgomery Jr. is a 71 y.o. male admitted to [facility]  1/3/2017      Chief Complaint/Reason for admission:  s/p Spanning external fixation of left tibial fracture nonunion, closed treatment of left tibial fracture nonunion with manipulation under   Anesthesia, and excisional debridement of left heel ulcer 3 x 3 cm  performed by Dr. Cortés on 12/28/2016.      The patient was seen and examined this morning at the bedside. Patient reports no acute issues .  Patient reports that pain is adequately controlled.    _______________    Vitals:    01/10/17 0600 01/10/17 0800 01/10/17 1935 01/11/17 0400   BP:  (!) 129/52 (!) 131/59    Pulse:  70 78    Resp:  18 20    Temp:  97.5 °F (36.4 °C) 97.5 °F (36.4 °C)    TempSrc:  Oral Oral    SpO2:  95% 95%    Weight: (!) 147.1 kg (324 lb 4.8 oz)   (!) 149.2 kg (328 lb 14.8 oz)   Height:           Vital Signs (Most Recent)  Temp: 97.5 °F (36.4 °C) (01/10/17 1935)  Pulse: 78 (01/10/17 1935)  Resp: 20 (01/10/17 1935)  BP: (!) 131/59 (01/10/17 1935)  SpO2: 95 % (01/10/17 1935)    Vital Signs Range (Last 24H):  Temp:  [97.5 °F (36.4 °C)]   Pulse:  [78]   Resp:  [20]   BP: (131)/(59)   SpO2:  [95 %]     Physical Exam:  Awake/alert/oriented x3, No acute distress, Afebrile, Vital signs stable   Normocephalic, Atraumatic   No gross deficit of cranial nerves   Palpable distal pulses   Good inspiratory effort with unlabored breathing   Dressings clean/dry/intact, Operative limb neurovascularly intact   Incision: heel dressing changed by wound care. Santyl applied to center of wound and covered with moist gauze and an island dressing. Pin sites clean dry and intact with no drainage or erythema    Recent Labs      01/09/17   0543   K  4.3   CALCIUM  9.3   WBC  10.70   HGB  10.9*   HCT  34.4*   PLT  283       I/O last 3 completed shifts:  In: 1360 [P.O.:1250; I.V.:10; IV Piggyback:100]  Out: 5675 [Urine:5675]    Assessment:   2 weeks s/p Spanning external fixation of left  tibial fracture nonunion, closed treatment of left tibial fracture nonunion with manipulation under   Anesthesia, and excisional debridement of left heel ulcer 3 x 3 cm    Plan:  PT/OT  Pain Control  DVT Prophylaxis  Follow up with Dr. Cortés as scheduled. Will discuss with Dr. Cortés to arrange an appointment    Sabrina Casey NP Orthopedic Surgery

## 2017-01-11 NOTE — PT/OT/SLP PROGRESS
Occupational Therapy  Treatment    Janusz Montgomery Jr.   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    Pain Ratin/10  Location - Side: Bilateral  Location - Orientation: lower  Location: back  Pain Addressed: Pre-medicate for activity  Pain Rating Post-Intervention: 2/10    Objective:  Patient found with:  (supine in bed with ext fixator)    Functional Mobility:  Bed Mobility:   Supine to sit: Modified Independent   Sit to supine: Activity did not occur   Rolling: Modified Independent   Scooting: Modified Independent    Functional Status:  Dressing - Lower: Modified [place] (shoe)  Bed/Chair/WC: Supervision or Setup (setup w/c to t/f from bed no AD)      Balance:   Static Sit: Normal: Patient able to maintain steady balance without handhold support.  Dynamic Sit: Normal: Patient accepts maximal challenge and can shift weight easily within full range in all directions.  Static Stand: Fair: Patient able to maintain balance with handhold support; may require occasional minimal assistance.  Dynamic Stand: Fair: Patient accepts minimal challenge; able to maintain balance while turning head/trunk.    OT Exercises: UE Ergometer 10 min  Rowing 35# 15 x 3    Additional Treatment:  Shoulder ext rotation with blue theraband 10x 3  Chest press in w/c with blue band and 5# dowel 10 x 3  4# dumbell 15 x 2 wrist flexion and extension  Velcro   strengthening x 5 min  Pt stood at table with sup and NWB x 2 min then seated break then 6 min while performing a reaching  task    Patient left up in chair     ASSESSMENT:  Pt tolerated tx and demonstrates increased indep with t/f's and standing balance/tolerance. Pt cont to benefit from OT to address LBD, toileting, standing balance, UE strength and endurance  Rehab identified problem list/impairments: Rehab identified problem list/impairments: weakness, impaired endurance, gait instability, impaired functional mobilty, impaired self care skills, impaired balance, decreased lower extremity function, orthopedic precautions, edema, pain, impaired skin    Rehab potential is good    Activity tolerance: Good    Discharge recommendations: home with home health     Barriers to discharge: Barriers to Discharge: Decreased caregiver support     Equipment recommendations: none     GOALS:   Occupational Therapy Goals        Problem: Occupational Therapy Goal    Goal Priority Disciplines Outcome Interventions   Occupational Therapy Goal     OT, PT/OT Ongoing (interventions implemented as appropriate)    Description:  Goals to be met by: 14 days     Patient will increase functional independence with ADLs by performing:    LE Dressing with Set-up Assistance and Assistive Devices as needed.  Toileting from bedside commode with Minimal Assistance for hygiene and clothing management.   Bathing from  edge of bed with Minimal Assistance.  Stand pivot transfers with Modified [place].  Toilet transfer to bedside commode with Modified [place].                Plan:  Patient to be seen 5 x/week to address the above listed problems via self-care/home management, therapeutic activities, therapeutic exercises  Plan of Care reviewed with: patient    NIK Munson  01/11/2017

## 2017-01-11 NOTE — PLAN OF CARE
Problem: Occupational Therapy Goal  Goal: Occupational Therapy Goal  Goals to be met by: 14 days     Patient will increase functional independence with ADLs by performing:    LE Dressing with Set-up Assistance and Assistive Devices as needed.  Toileting from bedside commode with Minimal Assistance for hygiene and clothing management.   Bathing from edge of bed with Minimal Assistance.  Stand pivot transfers with Modified [place].  Toilet transfer to bedside commode with Modified [place].   Outcome: Ongoing (interventions implemented as appropriate)  Progressing. NIK Munson  1/11/2017

## 2017-01-12 DIAGNOSIS — S82.202F: Primary | ICD-10-CM

## 2017-01-12 DIAGNOSIS — S82.402F: Primary | ICD-10-CM

## 2017-01-12 LAB
ALBUMIN SERPL BCP-MCNC: 3 G/DL
ALP SERPL-CCNC: 115 U/L
ALT SERPL W/O P-5'-P-CCNC: 16 U/L
ANION GAP SERPL CALC-SCNC: 9 MMOL/L
AST SERPL-CCNC: 18 U/L
BASOPHILS # BLD AUTO: 0.09 K/UL
BASOPHILS NFR BLD: 0.8 %
BILIRUB SERPL-MCNC: 0.5 MG/DL
BUN SERPL-MCNC: 21 MG/DL
CALCIUM SERPL-MCNC: 8.9 MG/DL
CHLORIDE SERPL-SCNC: 101 MMOL/L
CO2 SERPL-SCNC: 29 MMOL/L
CREAT SERPL-MCNC: 1.4 MG/DL
CRP SERPL-MCNC: 22.5 MG/L
DIFFERENTIAL METHOD: ABNORMAL
EOSINOPHIL # BLD AUTO: 1.4 K/UL
EOSINOPHIL NFR BLD: 13.4 %
ERYTHROCYTE [DISTWIDTH] IN BLOOD BY AUTOMATED COUNT: 14.5 %
ERYTHROCYTE [SEDIMENTATION RATE] IN BLOOD BY WESTERGREN METHOD: 96 MM/HR
EST. GFR  (AFRICAN AMERICAN): 58.8 ML/MIN/1.73 M^2
EST. GFR  (NON AFRICAN AMERICAN): 50.9 ML/MIN/1.73 M^2
GLUCOSE SERPL-MCNC: 80 MG/DL
HCT VFR BLD AUTO: 32.5 %
HGB BLD-MCNC: 10.5 G/DL
LYMPHOCYTES # BLD AUTO: 2.7 K/UL
LYMPHOCYTES NFR BLD: 24.8 %
MCH RBC QN AUTO: 32.4 PG
MCHC RBC AUTO-ENTMCNC: 32.3 %
MCV RBC AUTO: 100 FL
MONOCYTES # BLD AUTO: 0.6 K/UL
MONOCYTES NFR BLD: 5.9 %
NEUTROPHILS # BLD AUTO: 5.9 K/UL
NEUTROPHILS NFR BLD: 55.1 %
PLATELET # BLD AUTO: 239 K/UL
PMV BLD AUTO: 10.5 FL
POTASSIUM SERPL-SCNC: 3.8 MMOL/L
PROT SERPL-MCNC: 6.8 G/DL
RBC # BLD AUTO: 3.24 M/UL
SODIUM SERPL-SCNC: 139 MMOL/L
WBC # BLD AUTO: 10.75 K/UL

## 2017-01-12 PROCEDURE — 25000003 PHARM REV CODE 250: Performed by: NURSE PRACTITIONER

## 2017-01-12 PROCEDURE — 97535 SELF CARE MNGMENT TRAINING: CPT

## 2017-01-12 PROCEDURE — 80053 COMPREHEN METABOLIC PANEL: CPT

## 2017-01-12 PROCEDURE — 97116 GAIT TRAINING THERAPY: CPT

## 2017-01-12 PROCEDURE — 86140 C-REACTIVE PROTEIN: CPT

## 2017-01-12 PROCEDURE — 25000003 PHARM REV CODE 250: Performed by: INTERNAL MEDICINE

## 2017-01-12 PROCEDURE — 25000003 PHARM REV CODE 250: Performed by: STUDENT IN AN ORGANIZED HEALTH CARE EDUCATION/TRAINING PROGRAM

## 2017-01-12 PROCEDURE — 11000004 HC SNF PRIVATE

## 2017-01-12 PROCEDURE — 97110 THERAPEUTIC EXERCISES: CPT

## 2017-01-12 PROCEDURE — 85651 RBC SED RATE NONAUTOMATED: CPT

## 2017-01-12 PROCEDURE — 85025 COMPLETE CBC W/AUTO DIFF WBC: CPT

## 2017-01-12 PROCEDURE — 97530 THERAPEUTIC ACTIVITIES: CPT

## 2017-01-12 PROCEDURE — 36415 COLL VENOUS BLD VENIPUNCTURE: CPT

## 2017-01-12 RX ORDER — TERIPARATIDE 250 UG/ML
20 INJECTION, SOLUTION SUBCUTANEOUS DAILY
Qty: 2.4 ML | Refills: 11 | Status: SHIPPED | OUTPATIENT
Start: 2017-01-12 | End: 2017-01-23

## 2017-01-12 RX ADMIN — PANTOPRAZOLE SODIUM 40 MG: 40 TABLET, DELAYED RELEASE ORAL at 09:01

## 2017-01-12 RX ADMIN — PIPERACILLIN SODIUM,TAZOBACTAM SODIUM 4.5 G: 4; .5 INJECTION, POWDER, FOR SOLUTION INTRAVENOUS at 10:01

## 2017-01-12 RX ADMIN — Medication 10 ML: at 06:01

## 2017-01-12 RX ADMIN — PIPERACILLIN SODIUM,TAZOBACTAM SODIUM 4.5 G: 4; .5 INJECTION, POWDER, FOR SOLUTION INTRAVENOUS at 06:01

## 2017-01-12 RX ADMIN — Medication 220 MG: at 09:01

## 2017-01-12 RX ADMIN — STANDARDIZED SENNA CONCENTRATE AND DOCUSATE SODIUM 1 TABLET: 8.6; 5 TABLET, FILM COATED ORAL at 09:01

## 2017-01-12 RX ADMIN — Medication 10 ML: at 11:01

## 2017-01-12 RX ADMIN — PIPERACILLIN SODIUM,TAZOBACTAM SODIUM 4.5 G: 4; .5 INJECTION, POWDER, FOR SOLUTION INTRAVENOUS at 03:01

## 2017-01-12 RX ADMIN — ASPIRIN 325 MG ORAL TABLET 325 MG: 325 PILL ORAL at 09:01

## 2017-01-12 RX ADMIN — LACTOBACILLUS ACIDOPHILUS / LACTOBACILLUS BULGARICUS 1 EACH: 100 MILLION CFU STRENGTH GRANULES at 10:01

## 2017-01-12 RX ADMIN — Medication 10 ML: at 05:01

## 2017-01-12 RX ADMIN — RAMIPRIL 5 MG: 2.5 CAPSULE ORAL at 09:01

## 2017-01-12 RX ADMIN — GABAPENTIN 300 MG: 300 CAPSULE ORAL at 03:01

## 2017-01-12 RX ADMIN — VENLAFAXINE 150 MG: 37.5 TABLET ORAL at 09:01

## 2017-01-12 RX ADMIN — TORSEMIDE 40 MG: 20 TABLET ORAL at 09:01

## 2017-01-12 RX ADMIN — ATORVASTATIN CALCIUM 40 MG: 20 TABLET, FILM COATED ORAL at 09:01

## 2017-01-12 RX ADMIN — Medication 10 ML: at 12:01

## 2017-01-12 RX ADMIN — COLLAGENASE SANTYL: 250 OINTMENT TOPICAL at 09:01

## 2017-01-12 RX ADMIN — POLYETHYLENE GLYCOL 3350 17 G: 17 POWDER, FOR SOLUTION ORAL at 09:01

## 2017-01-12 RX ADMIN — METOPROLOL TARTRATE 25 MG: 25 TABLET ORAL at 10:01

## 2017-01-12 RX ADMIN — ASPIRIN 325 MG ORAL TABLET 325 MG: 325 PILL ORAL at 10:01

## 2017-01-12 RX ADMIN — DICLOFENAC 2 G: 10 GEL TOPICAL at 09:01

## 2017-01-12 RX ADMIN — METOPROLOL TARTRATE 25 MG: 25 TABLET ORAL at 09:01

## 2017-01-12 RX ADMIN — VENLAFAXINE 150 MG: 37.5 TABLET ORAL at 10:01

## 2017-01-12 RX ADMIN — STANDARDIZED SENNA CONCENTRATE AND DOCUSATE SODIUM 1 TABLET: 8.6; 5 TABLET, FILM COATED ORAL at 10:01

## 2017-01-12 RX ADMIN — GABAPENTIN 300 MG: 300 CAPSULE ORAL at 10:01

## 2017-01-12 RX ADMIN — GABAPENTIN 300 MG: 300 CAPSULE ORAL at 06:01

## 2017-01-12 RX ADMIN — LACTOBACILLUS ACIDOPHILUS / LACTOBACILLUS BULGARICUS 1 EACH: 100 MILLION CFU STRENGTH GRANULES at 09:01

## 2017-01-12 RX ADMIN — TAMSULOSIN HYDROCHLORIDE 0.4 MG: 0.4 CAPSULE ORAL at 09:01

## 2017-01-12 NOTE — PT/OT/SLP PROGRESS
Occupational Therapy  Treatment    Janusz Montgomery Jr.   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    Pain Ratin/10              Pain Rating Post-Intervention: 0/10    Objective:  Patient found with:  (on toilet)    Functional Mobility:   Scooting: Modified Independent    Functional Status:    Toileting: Supervision or Setup (SBA from standard toilet with grab bar and use of sink while performing  clothing management; hygiene completed seated on toilet)  Bed/Chair/WC: Supervision or Setup (pt stood from w/c at counter to have hair washed.  approx 8 min stand then seated break then 3 min stand)  Toilet: Supervision or Setup (toilet with grab bar)     Balance:   Static Sit: Normal: Patient able to maintain steady balance without handhold support.  Dynamic Sit: Good: Patient accepts moderate challenge; able to maintain balance while picking object off floor.  Static Stand: Fair: Patient able to maintain balance with handhold support; may require occasional minimal assistance.  Dynamic Stand: Fair: Patient accepts minimal challenge; able to maintain balance while turning head/trunk.    OT Exercises: UE Ergometer 10 min    Additional Treatment:  Blue theraband 10 x 3 with shoulder external rotation with adduction  5# dowel shoulder press 15 x 2   Mod I with w/c mobility     Patient left up in chair     ASSESSMENT:  Pt tolerated tx and demonstrated increased standing tolerance and indep with toileting. Pt cont to benefit from  OT to increase functional indep and safety    Rehab identified problem list/impairments: Rehab identified problem list/impairments: weakness, impaired endurance, impaired self care skills, impaired functional mobilty, gait instability, impaired balance, decreased lower extremity function, orthopedic precautions, decreased ROM, impaired skin, pain, edema    Rehab potential is good    Activity tolerance: Good    Discharge recommendations: home with home health     Barriers to discharge: Barriers to Discharge: Decreased caregiver support     Equipment recommendations: none     GOALS:   Occupational Therapy Goals        Problem: Occupational Therapy Goal    Goal Priority Disciplines Outcome Interventions   Occupational Therapy Goal     OT, PT/OT Ongoing (interventions implemented as appropriate)    Description:  Goals to be met by: 14 days     Patient will increase functional independence with ADLs by performing:    LE Dressing with Set-up Assistance and Assistive Devices as needed.  Toileting from bedside commode with Minimal Assistance for hygiene and clothing management.   Bathing from  edge of bed with Minimal Assistance.  Stand pivot transfers with Modified [place].  Toilet transfer to bedside commode with Modified [place].                Plan:  Patient to be seen 5 x/week to address the above listed problems via self-care/home management, therapeutic activities, therapeutic exercises  Plan of Care reviewed with: patient    NIK Munson  01/12/2017

## 2017-01-12 NOTE — PLAN OF CARE
Problem: Occupational Therapy Goal  Goal: Occupational Therapy Goal  Goals to be met by: 14 days     Patient will increase functional independence with ADLs by performing:    LE Dressing with Set-up Assistance and Assistive Devices as needed.  Toileting from bedside commode with Minimal Assistance for hygiene and clothing management.   Bathing from edge of bed with Minimal Assistance.  Stand pivot transfers with Modified [place].  Toilet transfer to bedside commode with Modified [place].   Outcome: Ongoing (interventions implemented as appropriate)  Progressing toward  Goals. NIK Munson  1/12/2017

## 2017-01-12 NOTE — PT/OT/SLP PROGRESS
"Physical Therapy  Treatment    Janusz Montgomery Jr.   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    PT Received On: 17                     Billable Minutes:  Gait [id number], Therapeutic Activity 8 and Therapeutic Exercise 30    Treatment Type: Treatment  PT/PTA: PTA     PTA Visit Number: 1       General Precautions: Standard, fall  Orthopedic Precautions: LLE non weight bearing   Braces:  (external fixator LLE)         Subjective:  "I'm good."    Pain Ratin/10           Pain Addressed: Pre-medicate for activity  Pain Rating Post-Intervention: 0/10    Objective:  Patient found with:  (supine in bed)     Functional Mobility:  Bed Mobility :   Supine <> sit with mod I    Transfers:  Bed/Chair/WC: Supervision or Setup  Sit > stand from w/c with RW and SBA  SPT from bed > w/c with SBA    Gait:  Distance Walk: pt ambulated x 53 feet and 50 feet with NWB LLE, RW and CGA/close SBA for safety.  w/c in tow  Walk: Minimal Contact Assistance  Assistive Device: rolling walker  Gait Deviation(s): decreased sonali;decreased toe-to-floor clearance;decreased weight-shifting ability;decreased velocity of limb motion;decreased step length;decreased stride length;decreased swing-to-stance ratio    Wheelchair: Exception (Household Locomotion)  Distance Wheelchair: 150 feet with RLE and mod I     Balance:   Static Sit: Normal: Patient able to maintain steady balance without handhold support.  Dynamic Sit:  Normal: Patient accepts maximal challenge and can shift weight easily within full range in all directions.  Static Stand: Good: Patient able to maintain balance without handhold support, limited postural sway  Dynamic stand: Fair: Patient accepts minimal challenge; able to maintain balance while turning head/trunk.    PT Exercises:   Ankle pumps 30  Quad sets 30  Glut sets 30  Heel slides 30  Hip flexion 30  Long arc quads 30  Knee flex 30  ABduction " 30  ADduction 30    Additional Treatment:  LBE x 15 minutes with RLE for strengthening    Patient left up in chair withOT    Assessment:  Janusz Montgomery Jr. is a 71 y.o. 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 Pt remains at a SBA/CGA for transfers and gait.  Will continue to benefit from PT.    Rehab identified problem list/impairments: Rehab identified problem list/impairments: weakness, impaired endurance, impaired self care skills, impaired functional mobilty, gait instability, impaired balance, decreased coordination, decreased upper extremity function, decreased lower extremity function, decreased safety awareness, decreased ROM, impaired coordination, orthopedic precautions    Rehab potential is fair.    Activity tolerance: Good    Discharge recommendations: Discharge Facility/Level Of Care Needs: home with home health     Barriers to discharge: Barriers to Discharge: Decreased caregiver support    Equipment recommendations: Equipment Needed After Discharge: none     GOALS:   Physical Therapy Goals        Problem: Physical Therapy Goal    Goal Priority Disciplines Outcome Goal Variances Interventions   Physical Therapy Goal     PT/OT, PT Ongoing (interventions implemented as appropriate)     Description:  Goals to be met by: 2 weeks (tentative, pending medical progress)     Patient will increase functional independence with mobility by performin. Supine to sit with Modified [place] = not met  2. Sit to supine with Modified [place]= not met  3. Sit to stand transfer with Supervision and maintaining NWB LLE= not met  4. Bed to chair transfer with Supervision using Rolling Walker and maintaining NWB LLE= not met  5. Gait  x 25 feet with Stand-by Assistance using Rolling Walker.  and maintaining NWB LLE= not met    6. Wheelchair propulsion x150 feet with Modified [place] using bilateral upper extremities= not  met    7. Lower extremity exercise program x20-30 reps per handout, with assistance as needed and gym therex=  met                   PLAN:    Patient to be seen 5 x/week  to address the above listed problems via gait training, therapeutic activities, therapeutic exercises, wheelchair management/training  Plan of Care expires:    Plan of Care reviewed with: patient    Jevon Cannon, PTA  01/12/2017

## 2017-01-12 NOTE — PLAN OF CARE
Problem: Patient Care Overview  Goal: Plan of Care Review  Outcome: Revised  AAO X4. Respirations even and unlabored. No distress noted. Afebrile with recorded temp of 98.0. External fixator to LLE. Pin sites without redness, warmth or drainage. Stage IV pressure ulcer to left heel with daily dressing changes. NWB to LLE. Requires assistance to BSCC. Right upper arm PICC intact without redness, warmth or drainage. Instructed to call of assistance. C/o pain and pain med given as ordered. Pain and safety monitored every two hours. Bed low, call light within reach, bed locked.

## 2017-01-12 NOTE — PLAN OF CARE
Problem: Pressure Ulcer Risk (Isidoro Scale) (Adult,Obstetrics,Pediatric)  Goal: Skin Integrity  Patient will demonstrate the desired outcomes by discharge/transition of care.   Outcome: Ongoing (interventions implemented as appropriate)  Pressure ulcer to left heel has progressed to healing Stage IV.

## 2017-01-12 NOTE — PLAN OF CARE
Problem: Physical Therapy Goal  Goal: Physical Therapy Goal  Goals to be met by: 2 weeks (tentative, pending medical progress)     Patient will increase functional independence with mobility by performin. Supine to sit with Modified [place] = not met  2. Sit to supine with Modified [place]= not met  3. Sit to stand transfer with Supervision and maintaining NWB LLE= not met  4. Bed to chair transfer with Supervision using Rolling Walker and maintaining NWB LLE= not met  5. Gait x 25 feet with Stand-by Assistance using Rolling Walker. and maintaining NWB LLE= not met    6. Wheelchair propulsion x150 feet with Modified [place] using bilateral upper extremities= not met    7. Lower extremity exercise program x20-30 reps per handout, with assistance as needed and gym therex= met   Outcome: Ongoing (interventions implemented as appropriate)  Goals remain appropriate.

## 2017-01-13 PROCEDURE — 97110 THERAPEUTIC EXERCISES: CPT

## 2017-01-13 PROCEDURE — 25000003 PHARM REV CODE 250: Performed by: STUDENT IN AN ORGANIZED HEALTH CARE EDUCATION/TRAINING PROGRAM

## 2017-01-13 PROCEDURE — 25000003 PHARM REV CODE 250: Performed by: INTERNAL MEDICINE

## 2017-01-13 PROCEDURE — 25000003 PHARM REV CODE 250: Performed by: NURSE PRACTITIONER

## 2017-01-13 PROCEDURE — 97116 GAIT TRAINING THERAPY: CPT

## 2017-01-13 PROCEDURE — 11000004 HC SNF PRIVATE

## 2017-01-13 RX ADMIN — STANDARDIZED SENNA CONCENTRATE AND DOCUSATE SODIUM 1 TABLET: 8.6; 5 TABLET, FILM COATED ORAL at 10:01

## 2017-01-13 RX ADMIN — Medication 10 ML: at 11:01

## 2017-01-13 RX ADMIN — ASPIRIN 325 MG ORAL TABLET 325 MG: 325 PILL ORAL at 10:01

## 2017-01-13 RX ADMIN — GABAPENTIN 300 MG: 300 CAPSULE ORAL at 01:01

## 2017-01-13 RX ADMIN — METOPROLOL TARTRATE 25 MG: 25 TABLET ORAL at 10:01

## 2017-01-13 RX ADMIN — DICLOFENAC 2 G: 10 GEL TOPICAL at 10:01

## 2017-01-13 RX ADMIN — COLLAGENASE SANTYL: 250 OINTMENT TOPICAL at 10:01

## 2017-01-13 RX ADMIN — Medication 10 ML: at 05:01

## 2017-01-13 RX ADMIN — PIPERACILLIN SODIUM,TAZOBACTAM SODIUM 4.5 G: 4; .5 INJECTION, POWDER, FOR SOLUTION INTRAVENOUS at 10:01

## 2017-01-13 RX ADMIN — PANTOPRAZOLE SODIUM 40 MG: 40 TABLET, DELAYED RELEASE ORAL at 10:01

## 2017-01-13 RX ADMIN — ATORVASTATIN CALCIUM 40 MG: 20 TABLET, FILM COATED ORAL at 10:01

## 2017-01-13 RX ADMIN — VENLAFAXINE 150 MG: 37.5 TABLET ORAL at 10:01

## 2017-01-13 RX ADMIN — LACTOBACILLUS ACIDOPHILUS / LACTOBACILLUS BULGARICUS 1 EACH: 100 MILLION CFU STRENGTH GRANULES at 10:01

## 2017-01-13 RX ADMIN — OXYCODONE HYDROCHLORIDE AND ACETAMINOPHEN 1 TABLET: 10; 325 TABLET ORAL at 10:01

## 2017-01-13 RX ADMIN — TAMSULOSIN HYDROCHLORIDE 0.4 MG: 0.4 CAPSULE ORAL at 10:01

## 2017-01-13 RX ADMIN — Medication 10 ML: at 06:01

## 2017-01-13 RX ADMIN — PIPERACILLIN SODIUM,TAZOBACTAM SODIUM 4.5 G: 4; .5 INJECTION, POWDER, FOR SOLUTION INTRAVENOUS at 01:01

## 2017-01-13 RX ADMIN — TORSEMIDE 40 MG: 20 TABLET ORAL at 10:01

## 2017-01-13 RX ADMIN — GABAPENTIN 300 MG: 300 CAPSULE ORAL at 10:01

## 2017-01-13 RX ADMIN — PIPERACILLIN SODIUM,TAZOBACTAM SODIUM 4.5 G: 4; .5 INJECTION, POWDER, FOR SOLUTION INTRAVENOUS at 06:01

## 2017-01-13 RX ADMIN — GABAPENTIN 300 MG: 300 CAPSULE ORAL at 06:01

## 2017-01-13 RX ADMIN — RAMIPRIL 5 MG: 2.5 CAPSULE ORAL at 10:01

## 2017-01-13 RX ADMIN — Medication 10 ML: at 01:01

## 2017-01-13 RX ADMIN — Medication 220 MG: at 10:01

## 2017-01-13 NOTE — PT/OT/SLP PROGRESS
Occupational Therapy  Treatment    Janusz Montgomery Jr.   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    Pain Ratin/10  Location - Side: Left     Location: leg  Pain Addressed: Reposition  Pain Rating Post-Intervention: /10    Objective:  Patient found with:  (seated in w/c with external fixator)    Functional Mobility:  Bed Mobility:   Supine to sit: Activity did not occur   Sit to supine: Activity did not occur   Rolling: Activity did not occur   Scooting: Activity did not occur    Functional Status:        Balance:   Static Sit: Good: Patient able to maintain balance without handhold support, limited postural sway.  Dynamic Sit: Good: Patient accepts moderate challenge; able to maintain balance while picking object off floor.    OT Exercises: UBE x 15 minutes ( 7 minutes with bilater UE 1 # wrist weights)  Rickshaw with 20 # x 4 sets 25 reps  AROM with 5 # dowel for chest presses as wel as elbow flex/ext  AROM LUE for scapular retraction and depression x ~ 6 reps  AROM  LUE For ER/IR x 10 reps  AROM  LUEfor scaption to 90 degrees x 1 set 10 reps   AROM with 4# dumbbell for RUE shoulder flexion as well as abduction x 2 sets 10 reps      Additional Treatment:  Pt. Educated on need for assist with transfers.  Pt. Verbalized understanding.     Patient left up in chair with LLE elevated    ASSESSMENT:  Janusz Montgomery Jr. is a 71 y.o. male with a medical diagnosis of Traumatic type III open fracture of shaft of left tibia and  fibula with nonunion and presents with deficits with higher level ADL tasks involving standing as well as decreased endurance and would benefit from continued OT servcies.    Rehab identified problem list/impairments: Rehab identified problem list/impairments: impaired endurance, impaired self care skills, impaired functional mobilty    Rehab potential is good    Activity tolerance: Good    Discharge recommendations: home health OT (with Supervision)     Barriers to discharge:       Equipment recommendations: none     GOALS:   Occupational Therapy Goals        Problem: Occupational Therapy Goal    Goal Priority Disciplines Outcome Interventions   Occupational Therapy Goal     OT, PT/OT Ongoing (interventions implemented as appropriate)    Description:  Goals to be met by: 14 days     Patient will increase functional independence with ADLs by performing:    LE Dressing with Set-up Assistance and Assistive Devices as needed.  Toileting from bedside commode with Minimal Assistance for hygiene and clothing management.   Bathing from  edge of bed with Minimal Assistance.  Stand pivot transfers with Modified [place].  Toilet transfer to bedside commode with Modified [place].                Plan:  Patient to be seen 5 x/week to address the above listed problems via self-care/home management, therapeutic activities, therapeutic exercises  Plan of Care expires:    Plan of Care reviewed with: patient    NIK Jimenez  01/13/2017

## 2017-01-13 NOTE — PLAN OF CARE
Problem: Physical Therapy Goal  Goal: Physical Therapy Goal  Goals to be met by: 2 weeks (tentative, pending medical progress)     Patient will increase functional independence with mobility by performin. Supine to sit with Modified [place] = not met  2. Sit to supine with Modified [place]= not met  3. Sit to stand transfer with Supervision and maintaining NWB LLE= not met  4. Bed to chair transfer with Supervision using Rolling Walker and maintaining NWB LLE= not met  5. Gait x 25 feet with Stand-by Assistance using Rolling Walker. and maintaining NWB LLE= not met    6. Wheelchair propulsion x150 feet with Modified [place] using bilateral upper extremities= not met    7. Lower extremity exercise program x20-30 reps per handout, with assistance as needed and gym therex= met   Outcome: Ongoing (interventions implemented as appropriate)  Goals remain appropriate

## 2017-01-13 NOTE — PT/OT/SLP PROGRESS
"Physical Therapy  Treatment    Janusz Montgomery Jr.   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    PT Received On: 17                     Billable Minutes:45    Gait [id number], Therapeutic Activity 5 and Therapeutic Exercise 25    Treatment Type: Treatment  PT/PTA: PTA     PTA Visit Number: 2       General Precautions: Standard, fall  Orthopedic Precautions: LLE non weight bearing   Braces:  (external fixator) IV         Subjective:  "OK"    Pain Ratin/10              Pain Rating Post-Intervention: 0/10    Objective:  Patient found with:  (in WC, external fixator LLE, elev on leg rest)     Functional Status:  Bed/Chair/WC: sit std with RW SBA NWB LLE  Distance Walk: ~ 32 ft with RW  NWB LLE x 2 trials seated rest break , wc follow, iv in tow vcs for taking his time for inc safety/stability  Walk: Minimal Contact Assistance  Assistive Device: rolling walker  Gait Deviation(s): decreased sonali;decreased step length;decreased weight-shifting ability  Wheelchair: Exception (Household Locomotion)  Distance Wheelchair: ~100 ft     PT Exercises: 2x15 reps AP RLE, GS,LAQ,hip flex,abd/add    Additional Treatment:  LBE x 15 RLE    Patient left up in chair with all lines intact, call button in reach, nsg notified and belongings in reach.    Assessment:  Janusz Montgomery Jr. is a 71 y.o. 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      Rehab identified problem list/impairments: Rehab identified problem list/impairments: weakness, impaired endurance, impaired self care skills, impaired functional mobilty, gait instability, impaired balance, decreased lower extremity function, " orthopedic precautions    Rehab potential is good.    Activity tolerance: Fair    Discharge recommendations: Discharge Facility/Level Of Care Needs: home health PT (A/S upon D/C for safety)     Barriers to discharge: Barriers to Discharge: Decreased caregiver support    Equipment recommendations: Equipment Needed After Discharge: none     GOALS:   Physical Therapy Goals        Problem: Physical Therapy Goal    Goal Priority Disciplines Outcome Goal Variances Interventions   Physical Therapy Goal     PT/OT, PT Ongoing (interventions implemented as appropriate)     Description:  Goals to be met by: 2 weeks (tentative, pending medical progress)     Patient will increase functional independence with mobility by performin. Supine to sit with Modified [place] = not met  2. Sit to supine with Modified [place]= not met  3. Sit to stand transfer with Supervision and maintaining NWB LLE= not met  4. Bed to chair transfer with Supervision using Rolling Walker and maintaining NWB LLE= not met  5. Gait  x 25 feet with Stand-by Assistance using Rolling Walker.  and maintaining NWB LLE= not met    6. Wheelchair propulsion x150 feet with Modified [place] using bilateral upper extremities= not met    7. Lower extremity exercise program x20-30 reps per handout, with assistance as needed and gym therex=  met                   PLAN:    Patient to be seen 5 x/week  to address the above listed problems via gait training, therapeutic activities, therapeutic exercises, wheelchair management/training  Plan of Care expires:    Plan of Care reviewed with: patient    Felipa Polk, PTA  2017

## 2017-01-13 NOTE — PROGRESS NOTES
[facility]  Adult Nutrition  Consult Note    SUMMARY     Recommendations    Recommendation/Intervention:   1. Rec add arginaid bid   2. Rec add MVI daily   3. RD to monitor  Goals: Pt to meet >85% EEN  Nutrition Goal Status: new  Communication of RD Recs: reviewed with RN    Continuum of Care Plan    Referral to Outpatient Services:  (D/C planning: regular diet with supplements as needed)    Reason for Assessment    Reason for Assessment: length of stay  Diagnosis: other (see comments) (fractured bone)  Relevent Medical History: Stage IV heel ulcer, HLD, GOUT, H.Pylori, Ca, HTN, CAD   Interdisciplinary Rounds: did not attend     General Information Comments: Pt. with good po intake. Stage IV wound healing.    Nutrition Prescription Ordered    Current Diet Order: Regular       Nutrition Risk Screen     Nutrition Risk Screen: no indicators present    Nutrition/Diet History     Typical Food/Fluid Intake: Pt. eating % meals. Some constipation noted but last BM 1/12.  Food Preferences: Denies cultural, Restorationism, ethnic food preferences     Labs/Tests/Procedures/Meds     Pertinent Labs Reviewed: reviewed  Pertinent Labs Comments: alb 3.0  Pertinent Medications Reviewed: reviewed  Pertinent Medications Comments: 220 mg ZincSulfate, Senna, abx, l.bacillus, statin    Physical Findings    Overall Physical Appearance: obese   Oral/Mouth Cavity: WDL  Skin:  (Stage IV PU on heel-healing)    Anthropometrics     Height (inches): 72.99 in  Weight Method: Standard Scale  Weight (kg): (!) 145.8 kg  Ideal Body Weight (IBW), Male: 183.94 lb     % Ideal Body Weight, Male (lb): 174.75 lb     BMI (kg/m2): 42.42  BMI Grade: greater than 40 - morbid obesity     Estimated/Assessed Needs    Weight Used For Calorie Calculations: (!) 145.8 kg (321 lb 6.9 oz)   Height (cm): 185.4 cm     Energy Need Method: Kcal/kg (4018-6778 kcal)     RMR ([place]-St. Jeor Equation): 2280.81      Weight Used For Protein Calculations:  88 kg (194 lb 0.1 oz) (SBW)  Protein Requirements: 110-132 g    Fluid Need Method: RDA Method     Malnutrition (Undernutrition) Diagnosis    % Intake of Estimated Energy Needs: 75 - 100%  % Meal Intake: 100%     Nutrition Diagnosis    Nutrition Problem: Increased nutrient needs  Etiology/Related To: wound/fx  Nutrition Diagnosis Signs/Symptoms As Evidenced By: Stage IV wound/healing fx       Monitor and Evaluation    Food and Nutrient Intake: energy intake  Food and Nutrient Adminstration: diet order  Anthropometric Measurements: weight, weight change  Biochemical Data, Medical Tests and Procedures: electrolyte and renal panel, glucose/endocrine profile  Nutrition-Focused Physical Findings: overall appearance, skin    Nutrition Risk    Level of Risk:  (1 x week)    Nutrition Follow-Up    RD Follow-up?: Yes

## 2017-01-13 NOTE — PLAN OF CARE
Problem: Occupational Therapy Goal  Goal: Occupational Therapy Goal  Goals to be met by: 14 days     Patient will increase functional independence with ADLs by performing:    LE Dressing with Set-up Assistance and Assistive Devices as needed.  Toileting from bedside commode with Minimal Assistance for hygiene and clothing management.   Bathing from edge of bed with Minimal Assistance.  Stand pivot transfers with Modified [place].  Toilet transfer to bedside commode with Modified [place].   Outcome: Ongoing (interventions implemented as appropriate)  Pt. Is progressing with goals

## 2017-01-13 NOTE — PLAN OF CARE
Recommendations     Recommendation/Intervention:   1. Rec add arginaid bid   2. Rec add MVI daily   3. RD to monitor  Goals: Pt to meet >85% EEN  Nutrition Goal Status: new  Communication of RD Recs: reviewed with RN     Continuum of Care Plan     Referral to Outpatient Services: (D/C planning: regular diet with supplements as needed)

## 2017-01-13 NOTE — PLAN OF CARE
Problem: Occupational Therapy Goal  Goal: Occupational Therapy Goal  Goals to be met by: 14 days     Patient will increase functional independence with ADLs by performing:    LE Dressing with Set-up Assistance and Assistive Devices as needed.  Toileting from bedside commode with Minimal Assistance for hygiene and clothing management.   Bathing from edge of bed with Minimal Assistance.  Stand pivot transfers with Modified [place].  Toilet transfer to bedside commode with Modified [place].   Outcome: Ongoing (interventions implemented as appropriate)  Pt. Educated on there ex to LUE on this date.

## 2017-01-14 PROCEDURE — 25000003 PHARM REV CODE 250: Performed by: NURSE PRACTITIONER

## 2017-01-14 PROCEDURE — 25000003 PHARM REV CODE 250: Performed by: INTERNAL MEDICINE

## 2017-01-14 PROCEDURE — 11000004 HC SNF PRIVATE

## 2017-01-14 PROCEDURE — 25000003 PHARM REV CODE 250: Performed by: STUDENT IN AN ORGANIZED HEALTH CARE EDUCATION/TRAINING PROGRAM

## 2017-01-14 RX ADMIN — TORSEMIDE 40 MG: 20 TABLET ORAL at 12:01

## 2017-01-14 RX ADMIN — PIPERACILLIN SODIUM,TAZOBACTAM SODIUM 4.5 G: 4; .5 INJECTION, POWDER, FOR SOLUTION INTRAVENOUS at 05:01

## 2017-01-14 RX ADMIN — GABAPENTIN 300 MG: 300 CAPSULE ORAL at 02:01

## 2017-01-14 RX ADMIN — ASPIRIN 325 MG ORAL TABLET 325 MG: 325 PILL ORAL at 12:01

## 2017-01-14 RX ADMIN — Medication 10 ML: at 12:01

## 2017-01-14 RX ADMIN — COLLAGENASE SANTYL: 250 OINTMENT TOPICAL at 12:01

## 2017-01-14 RX ADMIN — ATORVASTATIN CALCIUM 40 MG: 20 TABLET, FILM COATED ORAL at 12:01

## 2017-01-14 RX ADMIN — DICLOFENAC 2 G: 10 GEL TOPICAL at 12:01

## 2017-01-14 RX ADMIN — PANTOPRAZOLE SODIUM 40 MG: 40 TABLET, DELAYED RELEASE ORAL at 12:01

## 2017-01-14 RX ADMIN — GABAPENTIN 300 MG: 300 CAPSULE ORAL at 05:01

## 2017-01-14 RX ADMIN — ASPIRIN 325 MG ORAL TABLET 325 MG: 325 PILL ORAL at 09:01

## 2017-01-14 RX ADMIN — POLYETHYLENE GLYCOL 3350 17 G: 17 POWDER, FOR SOLUTION ORAL at 12:01

## 2017-01-14 RX ADMIN — METOPROLOL TARTRATE 25 MG: 25 TABLET ORAL at 09:01

## 2017-01-14 RX ADMIN — LACTOBACILLUS ACIDOPHILUS / LACTOBACILLUS BULGARICUS 1 EACH: 100 MILLION CFU STRENGTH GRANULES at 09:01

## 2017-01-14 RX ADMIN — LACTOBACILLUS ACIDOPHILUS / LACTOBACILLUS BULGARICUS 1 EACH: 100 MILLION CFU STRENGTH GRANULES at 12:01

## 2017-01-14 RX ADMIN — PIPERACILLIN SODIUM,TAZOBACTAM SODIUM 4.5 G: 4; .5 INJECTION, POWDER, FOR SOLUTION INTRAVENOUS at 10:01

## 2017-01-14 RX ADMIN — GABAPENTIN 300 MG: 300 CAPSULE ORAL at 09:01

## 2017-01-14 RX ADMIN — STANDARDIZED SENNA CONCENTRATE AND DOCUSATE SODIUM 1 TABLET: 8.6; 5 TABLET, FILM COATED ORAL at 09:01

## 2017-01-14 RX ADMIN — Medication 220 MG: at 12:01

## 2017-01-14 RX ADMIN — VENLAFAXINE 150 MG: 37.5 TABLET ORAL at 12:01

## 2017-01-14 RX ADMIN — PIPERACILLIN SODIUM,TAZOBACTAM SODIUM 4.5 G: 4; .5 INJECTION, POWDER, FOR SOLUTION INTRAVENOUS at 02:01

## 2017-01-14 RX ADMIN — METOPROLOL TARTRATE 25 MG: 25 TABLET ORAL at 12:01

## 2017-01-14 RX ADMIN — Medication 10 ML: at 06:01

## 2017-01-14 RX ADMIN — VENLAFAXINE 150 MG: 37.5 TABLET ORAL at 09:01

## 2017-01-14 RX ADMIN — TAMSULOSIN HYDROCHLORIDE 0.4 MG: 0.4 CAPSULE ORAL at 12:01

## 2017-01-14 RX ADMIN — RAMIPRIL 5 MG: 2.5 CAPSULE ORAL at 12:01

## 2017-01-14 RX ADMIN — Medication 10 ML: at 05:01

## 2017-01-14 RX ADMIN — STANDARDIZED SENNA CONCENTRATE AND DOCUSATE SODIUM 1 TABLET: 8.6; 5 TABLET, FILM COATED ORAL at 12:01

## 2017-01-14 NOTE — PLAN OF CARE
Problem: Physical Therapy Goal  Goal: Physical Therapy Goal  Goals to be met by: 2 weeks (tentative, pending medical progress)     Patient will increase functional independence with mobility by performin. Supine to sit with Modified [place] = not met  2. Sit to supine with Modified [place]= not met  3. Sit to stand transfer with Supervision and maintaining NWB LLE= not met  4. Bed to chair transfer with Supervision using Rolling Walker and maintaining NWB LLE= not met  5. Gait x 25 feet with Stand-by Assistance using Rolling Walker. and maintaining NWB LLE= not met    6. Wheelchair propulsion x150 feet with Modified [place] using bilateral upper extremities= not met    7. Lower extremity exercise program x20-30 reps per handout, with assistance as needed and gym therex= met   Goals remain appropriate. Continue with Physical therapy Plan of Care. Georgia Andrea, PT 2017

## 2017-01-14 NOTE — PT/OT/SLP PROGRESS
Physical Therapy  Treatment    Janusz Montgomery Jr.   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    Pain Ratin/10              Pain Rating Post-Intervention: 0/10    Objective:  Patient found with: peripheral IV   Seated in w/c  Functional Mobility:    Functional Status:  Bed/Chair/WC:  (w/c<>stand w/ RW and SBA x2 trials.)  Distance Walk: 56 feet, 53 feet w/ RW NWB LLE. CGA. seated rest break, w/c follow and IV pole in tow. SOB after each gait trial. No LOB  Walk: Minimal Contact Assistance  Assistive Device: rolling walker  Gait Deviation(s): decreased sonali;decreased step length;decreased stride length  Distance Wheelchair: 100 feet w/ BUEs and RLE     PT Exercises: LBE x15 min w/ RLE; UBE x15 min w/ BUEs  Patient left up in chair with all lines intact and call button in reach.    Assessment:  Janusz Montgomery Jr. is a 71 y.o. 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 Patient will benefit from continued physical therapy to address deficits and improve safety and functional mobility. Continue with physical therapy plan of care.   .    Rehab identified problem list/impairments: Rehab identified problem list/impairments: weakness, impaired endurance, gait instability, impaired balance, decreased lower extremity function, orthopedic precautions    Rehab potential is good.    Activity tolerance: Good    Discharge recommendations: Discharge Facility/Level  Of Care Needs: home with home health     Barriers to discharge: Barriers to Discharge: Decreased caregiver support    Equipment recommendations: Equipment Needed After Discharge: none     GOALS:   Physical Therapy Goals        Problem: Physical Therapy Goal    Goal Priority Disciplines Outcome Goal Variances Interventions   Physical Therapy Goal     PT/OT, PT Ongoing (interventions implemented as appropriate)     Description:  Goals to be met by: 2 weeks (tentative, pending medical progress)     Patient will increase functional independence with mobility by performin. Supine to sit with Modified [place] = not met  2. Sit to supine with Modified [place]= not met  3. Sit to stand transfer with Supervision and maintaining NWB LLE= not met  4. Bed to chair transfer with Supervision using Rolling Walker and maintaining NWB LLE= not met  5. Gait  x 25 feet with Stand-by Assistance using Rolling Walker.  and maintaining NWB LLE= not met    6. Wheelchair propulsion x150 feet with Modified [place] using bilateral upper extremities= not met    7. Lower extremity exercise program x20-30 reps per handout, with assistance as needed and gym therex=  met                   PLAN:    Patient to be seen 5 x/week  to address the above listed problems via gait training, therapeutic activities, therapeutic exercises, wheelchair management/training  Plan of Care expires:    Plan of Care reviewed with: patient    Georgia REINALDO Andrea, PT  2017

## 2017-01-15 PROCEDURE — 11000004 HC SNF PRIVATE

## 2017-01-15 PROCEDURE — 25000003 PHARM REV CODE 250: Performed by: NURSE PRACTITIONER

## 2017-01-15 PROCEDURE — 25000003 PHARM REV CODE 250: Performed by: STUDENT IN AN ORGANIZED HEALTH CARE EDUCATION/TRAINING PROGRAM

## 2017-01-15 PROCEDURE — 25000003 PHARM REV CODE 250: Performed by: INTERNAL MEDICINE

## 2017-01-15 RX ADMIN — TAMSULOSIN HYDROCHLORIDE 0.4 MG: 0.4 CAPSULE ORAL at 10:01

## 2017-01-15 RX ADMIN — METOPROLOL TARTRATE 25 MG: 25 TABLET ORAL at 09:01

## 2017-01-15 RX ADMIN — Medication 10 ML: at 06:01

## 2017-01-15 RX ADMIN — Medication 220 MG: at 10:01

## 2017-01-15 RX ADMIN — Medication 10 ML: at 07:01

## 2017-01-15 RX ADMIN — ATORVASTATIN CALCIUM 40 MG: 20 TABLET, FILM COATED ORAL at 10:01

## 2017-01-15 RX ADMIN — Medication 10 ML: at 12:01

## 2017-01-15 RX ADMIN — METOPROLOL TARTRATE 25 MG: 25 TABLET ORAL at 10:01

## 2017-01-15 RX ADMIN — LACTOBACILLUS ACIDOPHILUS / LACTOBACILLUS BULGARICUS 1 EACH: 100 MILLION CFU STRENGTH GRANULES at 10:01

## 2017-01-15 RX ADMIN — DICLOFENAC 2 G: 10 GEL TOPICAL at 10:01

## 2017-01-15 RX ADMIN — COLLAGENASE SANTYL: 250 OINTMENT TOPICAL at 10:01

## 2017-01-15 RX ADMIN — PIPERACILLIN SODIUM,TAZOBACTAM SODIUM 4.5 G: 4; .5 INJECTION, POWDER, FOR SOLUTION INTRAVENOUS at 01:01

## 2017-01-15 RX ADMIN — GABAPENTIN 300 MG: 300 CAPSULE ORAL at 01:01

## 2017-01-15 RX ADMIN — TORSEMIDE 40 MG: 20 TABLET ORAL at 10:01

## 2017-01-15 RX ADMIN — VENLAFAXINE 150 MG: 37.5 TABLET ORAL at 09:01

## 2017-01-15 RX ADMIN — PANTOPRAZOLE SODIUM 40 MG: 40 TABLET, DELAYED RELEASE ORAL at 10:01

## 2017-01-15 RX ADMIN — LACTOBACILLUS ACIDOPHILUS / LACTOBACILLUS BULGARICUS 1 EACH: 100 MILLION CFU STRENGTH GRANULES at 09:01

## 2017-01-15 RX ADMIN — ASPIRIN 325 MG ORAL TABLET 325 MG: 325 PILL ORAL at 09:01

## 2017-01-15 RX ADMIN — STANDARDIZED SENNA CONCENTRATE AND DOCUSATE SODIUM 1 TABLET: 8.6; 5 TABLET, FILM COATED ORAL at 09:01

## 2017-01-15 RX ADMIN — POLYETHYLENE GLYCOL 3350 17 G: 17 POWDER, FOR SOLUTION ORAL at 10:01

## 2017-01-15 RX ADMIN — RAMIPRIL 5 MG: 2.5 CAPSULE ORAL at 10:01

## 2017-01-15 RX ADMIN — ASPIRIN 325 MG ORAL TABLET 325 MG: 325 PILL ORAL at 10:01

## 2017-01-15 RX ADMIN — PIPERACILLIN SODIUM,TAZOBACTAM SODIUM 4.5 G: 4; .5 INJECTION, POWDER, FOR SOLUTION INTRAVENOUS at 09:01

## 2017-01-15 RX ADMIN — GABAPENTIN 300 MG: 300 CAPSULE ORAL at 06:01

## 2017-01-15 RX ADMIN — GABAPENTIN 300 MG: 300 CAPSULE ORAL at 09:01

## 2017-01-15 RX ADMIN — STANDARDIZED SENNA CONCENTRATE AND DOCUSATE SODIUM 1 TABLET: 8.6; 5 TABLET, FILM COATED ORAL at 10:01

## 2017-01-15 RX ADMIN — VENLAFAXINE 150 MG: 37.5 TABLET ORAL at 10:01

## 2017-01-15 NOTE — PLAN OF CARE
Problem: Fall Risk (Adult)  Goal: Absence of Falls  Patient will demonstrate the desired outcomes by discharge/transition of care.   Outcome: Ongoing (interventions implemented as appropriate)  Utilizes nurses call light when assistance is needed. Call light remains within reach. Remains free of falls, injury or trauma. Will continue to monitor.

## 2017-01-16 LAB
ALBUMIN SERPL BCP-MCNC: 3.2 G/DL
ALP SERPL-CCNC: 124 U/L
ALT SERPL W/O P-5'-P-CCNC: 13 U/L
ANION GAP SERPL CALC-SCNC: 9 MMOL/L
AST SERPL-CCNC: 16 U/L
BASOPHILS # BLD AUTO: 0.09 K/UL
BASOPHILS NFR BLD: 0.8 %
BILIRUB SERPL-MCNC: 0.8 MG/DL
BUN SERPL-MCNC: 24 MG/DL
CALCIUM SERPL-MCNC: 9.3 MG/DL
CHLORIDE SERPL-SCNC: 101 MMOL/L
CO2 SERPL-SCNC: 28 MMOL/L
CREAT SERPL-MCNC: 1.6 MG/DL
DIFFERENTIAL METHOD: ABNORMAL
DIGOXIN SERPL-MCNC: <0.1 NG/ML
EOSINOPHIL # BLD AUTO: 0.9 K/UL
EOSINOPHIL NFR BLD: 8.5 %
EOSINOPHIL URNS QL WRIGHT STN: NORMAL
ERYTHROCYTE [DISTWIDTH] IN BLOOD BY AUTOMATED COUNT: 14.3 %
EST. GFR  (AFRICAN AMERICAN): 50.1 ML/MIN/1.73 M^2
EST. GFR  (NON AFRICAN AMERICAN): 43.3 ML/MIN/1.73 M^2
GLUCOSE SERPL-MCNC: 87 MG/DL
HCT VFR BLD AUTO: 35.6 %
HGB BLD-MCNC: 11.4 G/DL
LYMPHOCYTES # BLD AUTO: 2.3 K/UL
LYMPHOCYTES NFR BLD: 21 %
MCH RBC QN AUTO: 31.6 PG
MCHC RBC AUTO-ENTMCNC: 32 %
MCV RBC AUTO: 99 FL
MONOCYTES # BLD AUTO: 0.6 K/UL
MONOCYTES NFR BLD: 5.4 %
NEUTROPHILS # BLD AUTO: 6.9 K/UL
NEUTROPHILS NFR BLD: 64.3 %
PLATELET # BLD AUTO: 253 K/UL
PMV BLD AUTO: 10.9 FL
POTASSIUM SERPL-SCNC: 4.2 MMOL/L
PROT SERPL-MCNC: 7.2 G/DL
RBC # BLD AUTO: 3.61 M/UL
SODIUM SERPL-SCNC: 138 MMOL/L
WBC # BLD AUTO: 10.79 K/UL

## 2017-01-16 PROCEDURE — 85025 COMPLETE CBC W/AUTO DIFF WBC: CPT

## 2017-01-16 PROCEDURE — 97116 GAIT TRAINING THERAPY: CPT

## 2017-01-16 PROCEDURE — 25000003 PHARM REV CODE 250: Performed by: NURSE PRACTITIONER

## 2017-01-16 PROCEDURE — 36415 COLL VENOUS BLD VENIPUNCTURE: CPT

## 2017-01-16 PROCEDURE — 63600175 PHARM REV CODE 636 W HCPCS: Performed by: NURSE PRACTITIONER

## 2017-01-16 PROCEDURE — 97110 THERAPEUTIC EXERCISES: CPT

## 2017-01-16 PROCEDURE — 11000004 HC SNF PRIVATE

## 2017-01-16 PROCEDURE — 87205 SMEAR GRAM STAIN: CPT

## 2017-01-16 PROCEDURE — 80053 COMPREHEN METABOLIC PANEL: CPT

## 2017-01-16 PROCEDURE — 80162 ASSAY OF DIGOXIN TOTAL: CPT

## 2017-01-16 PROCEDURE — 99900058 HC 022 PAID UNDER SNF PPS

## 2017-01-16 PROCEDURE — 25000003 PHARM REV CODE 250: Performed by: STUDENT IN AN ORGANIZED HEALTH CARE EDUCATION/TRAINING PROGRAM

## 2017-01-16 RX ORDER — RAMIPRIL 2.5 MG/1
5 CAPSULE ORAL DAILY
Status: DISCONTINUED | OUTPATIENT
Start: 2017-01-18 | End: 2017-01-25 | Stop reason: HOSPADM

## 2017-01-16 RX ORDER — TORSEMIDE 20 MG/1
40 TABLET ORAL DAILY
Status: DISCONTINUED | OUTPATIENT
Start: 2017-01-18 | End: 2017-01-25 | Stop reason: HOSPADM

## 2017-01-16 RX ADMIN — LACTOBACILLUS ACIDOPHILUS / LACTOBACILLUS BULGARICUS 1 EACH: 100 MILLION CFU STRENGTH GRANULES at 09:01

## 2017-01-16 RX ADMIN — Medication 10 ML: at 12:01

## 2017-01-16 RX ADMIN — TORSEMIDE 40 MG: 20 TABLET ORAL at 09:01

## 2017-01-16 RX ADMIN — ATORVASTATIN CALCIUM 40 MG: 20 TABLET, FILM COATED ORAL at 09:01

## 2017-01-16 RX ADMIN — Medication 10 ML: at 07:01

## 2017-01-16 RX ADMIN — VENLAFAXINE 150 MG: 37.5 TABLET ORAL at 10:01

## 2017-01-16 RX ADMIN — ASPIRIN 325 MG ORAL TABLET 325 MG: 325 PILL ORAL at 09:01

## 2017-01-16 RX ADMIN — METOPROLOL TARTRATE 25 MG: 25 TABLET ORAL at 10:01

## 2017-01-16 RX ADMIN — GABAPENTIN 300 MG: 300 CAPSULE ORAL at 05:01

## 2017-01-16 RX ADMIN — GABAPENTIN 300 MG: 300 CAPSULE ORAL at 10:01

## 2017-01-16 RX ADMIN — PIPERACILLIN SODIUM,TAZOBACTAM SODIUM 4.5 G: 4; .5 INJECTION, POWDER, FOR SOLUTION INTRAVENOUS at 10:01

## 2017-01-16 RX ADMIN — PIPERACILLIN SODIUM,TAZOBACTAM SODIUM 4.5 G: 4; .5 INJECTION, POWDER, FOR SOLUTION INTRAVENOUS at 05:01

## 2017-01-16 RX ADMIN — COLLAGENASE SANTYL: 250 OINTMENT TOPICAL at 09:01

## 2017-01-16 RX ADMIN — PIPERACILLIN SODIUM,TAZOBACTAM SODIUM 4.5 G: 4; .5 INJECTION, POWDER, FOR SOLUTION INTRAVENOUS at 03:01

## 2017-01-16 RX ADMIN — LACTOBACILLUS ACIDOPHILUS / LACTOBACILLUS BULGARICUS 1 EACH: 100 MILLION CFU STRENGTH GRANULES at 10:01

## 2017-01-16 RX ADMIN — VENLAFAXINE 150 MG: 37.5 TABLET ORAL at 04:01

## 2017-01-16 RX ADMIN — METOPROLOL TARTRATE 25 MG: 25 TABLET ORAL at 09:01

## 2017-01-16 RX ADMIN — Medication 220 MG: at 09:01

## 2017-01-16 RX ADMIN — PANTOPRAZOLE SODIUM 40 MG: 40 TABLET, DELAYED RELEASE ORAL at 09:01

## 2017-01-16 RX ADMIN — TAMSULOSIN HYDROCHLORIDE 0.4 MG: 0.4 CAPSULE ORAL at 09:01

## 2017-01-16 RX ADMIN — GABAPENTIN 300 MG: 300 CAPSULE ORAL at 03:01

## 2017-01-16 RX ADMIN — DICLOFENAC 2 G: 10 GEL TOPICAL at 09:01

## 2017-01-16 RX ADMIN — Medication 10 ML: at 05:01

## 2017-01-16 NOTE — PT/OT/SLP PROGRESS
"Physical Therapy  Treatment    Janusz Montgomery Jr.   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    PT Received On: 17            Billable Minutes:  Gait [id number] and Therapeutic Exercise 30    Treatment Type: Treatment  PT/PTA: PTA     PTA Visit Number: 1       General Precautions: Standard, fall  Orthopedic Precautions: LLE non weight bearing   Braces:  (external fixator LLE)         Subjective:  "I'm fine."    Pain Ratin/10           Pain Addressed: Pre-medicate for activity  Pain Rating Post-Intervention: 0/10    Objective:  Patient found with:  (seated EOB)     Functional Mobility:  Bed Mobility :   Supine <> sit on mat with mod I    Transfers:  Bed/Chair/WC: Supervision or Setup  Sit <> stand from w/c with RW and SBA  SPT from bed > w/c <> mat with SBA    Gait:  Distance Walk: pt ambulated x 67 feet and 59 feet with NWB LLE, RW and CGA for safety. w/c in tow  Walk: Minimal Contact Assistance  Assistive Device: rolling walker  Gait Deviation(s): decreased sonali;decreased toe-to-floor clearance;decreased weight-shifting ability;decreased velocity of limb motion;decreased step length;decreased stride length;decreased swing-to-stance ratio    Wheelchair: Exception (Household Locomotion)  Distance Wheelchair: 150 feet, 100 feet with RLE and mod I     Balance:   Static Sit: Normal: Patient able to maintain steady balance without handhold support.  Dynamic Sit:  Normal: Patient accepts maximal challenge and can shift weight easily within full range in all directions.  Static Stand: Good: Patient able to maintain balance without handhold support, limited postural sway  Dynamic stand: Fair: Patient accepts minimal challenge; able to maintain balance while turning head/trunk.    PT Exercises:   Ankle pumps 4 x 15  Quad sets 5 x 15  Glut sets 4 x 15  Short arc quads 4 x 15  Hip flexion 4 x 15  Long arc quads 4 x 15    Additional " Treatment:  LBE x 10 minutes with RLE for strengthening    Patient left up in chair with OT in gym  Assessment:  Janusz Montgomery Jr. is a 71 y.o. 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  Pt tolerated therapy well.  Pt continues to limit with mobility 2* NWB LLE.  Pt will continue to benefit from PT services.    Rehab identified problem list/impairments: Rehab identified problem list/impairments: weakness, impaired endurance, impaired self care skills, impaired functional mobilty, gait instability, impaired balance, decreased coordination, decreased upper extremity function, decreased lower extremity function, decreased safety awareness, decreased ROM, impaired coordination, orthopedic precautions    Rehab potential is fair.    Activity tolerance: Fair    Discharge recommendations: Discharge Facility/Level Of Care Needs: home with home health     Barriers to discharge: Barriers to Discharge: Decreased caregiver support    Equipment recommendations: Equipment Needed After Discharge: none     GOALS:   Physical Therapy Goals        Problem: Physical Therapy Goal    Goal Priority Disciplines Outcome Goal Variances Interventions   Physical Therapy Goal     PT/OT, PT Ongoing (interventions implemented as appropriate)     Description:  Goals to be met by: 2 weeks (tentative, pending medical progress)     Patient will increase functional independence with mobility by performin. Supine to sit with Modified [place] = not met  2. Sit to supine with Modified [place]= not met  3. Sit to stand transfer with Supervision and maintaining NWB LLE= not met  4. Bed to chair transfer with Supervision using Rolling Walker and maintaining NWB LLE= not met  5. Gait  x 25 feet with Stand-by Assistance using Rolling Walker.  and maintaining NWB LLE= not met    6. Wheelchair propulsion x150 feet with Modified [place] using bilateral upper  extremities= not met    7. Lower extremity exercise program x20-30 reps per handout, with assistance as needed and gym therex=  met                   PLAN:    Patient to be seen 5 x/week  to address the above listed problems via gait training, therapeutic activities, therapeutic exercises, wheelchair management/training  Plan of Care expires:    Plan of Care reviewed with: patient    Jevon Cannon, PTA  01/16/2017

## 2017-01-16 NOTE — PLAN OF CARE
Problem: Physical Therapy Goal  Goal: Physical Therapy Goal  Goals to be met by: 2 weeks (tentative, pending medical progress)     Patient will increase functional independence with mobility by performin. Supine to sit with Modified [place] = not met  2. Sit to supine with Modified [place]= not met  3. Sit to stand transfer with Supervision and maintaining NWB LLE= not met  4. Bed to chair transfer with Supervision using Rolling Walker and maintaining NWB LLE= not met  5. Gait x 25 feet with Stand-by Assistance using Rolling Walker. and maintaining NWB LLE= not met    6. Wheelchair propulsion x150 feet with Modified [place] using bilateral upper extremities= not met    7. Lower extremity exercise program x20-30 reps per handout, with assistance as needed and gym therex= met   Outcome: Ongoing (interventions implemented as appropriate)  Goals remain appropriate.

## 2017-01-16 NOTE — PROGRESS NOTES
Labs drawn as ordered from PICC. Aseptic technique utilized. Each lumen flushed per protocol. Specimen given to .

## 2017-01-16 NOTE — PT/OT/SLP PROGRESS
Occupational Therapy  Treatment    Janusz Montgomery Jr.   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    Pain Ratin/10    Objective:  Patient found with:  (seated in w/c in gym)      Functional Status:    Balance:   Static Sit: Normal: Patient able to maintain steady balance without handhold support.  Dynamic Sit: Normal: Patient accepts maximal challenge and can shift weight easily within full range in all directions.    OT Exercises: Lat pull downs 40#, 15x4  Rowing 40#, 15x4   Bicep curls 10#, 15x3  External rotation blue theraband 15x3  chest press 5# with blue theraband, 15x2, 10x1      Patient left up in chair    ASSESSMENT:  Janusz Montgomery Jr. is a 71 y.o. 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 Pt. Is continuing to increase UE strength and endurance to assist in transfers. Pt. Would benefit from continued OT services to address weakness, balance, self care skills, and decreased extremity function.    Rehab identified problem list/impairments: Rehab identified problem list/impairments: weakness, impaired balance, impaired endurance, impaired self care skills, impaired functional mobilty, gait instability, decreased lower extremity function, impaired coordination, decreased ROM    Rehab potential is good    Activity tolerance: Good    Discharge recommendations: home with home health     Barriers to discharge:        Equipment recommendations: walker, rolling, wheelchair     GOALS:   Occupational Therapy Goals        Problem: Occupational Therapy Goal    Goal Priority Disciplines Outcome Interventions   Occupational Therapy Goal     OT, PT/OT Ongoing (interventions implemented as appropriate)    Description:  Goals to be met by: 14 days     Patient will increase functional independence with ADLs by performing:    LE Dressing with Set-up Assistance and Assistive Devices as needed.  Toileting from bedside commode with Minimal Assistance for hygiene and clothing management.   Bathing from  edge of bed with Minimal Assistance.  Stand pivot transfers with Modified [place].  Toilet transfer to bedside commode with Modified [place].                Plan:  Patient to be seen 5 x/week to address the above listed problems via self-care/home management, therapeutic exercises, therapeutic activities  Plan of Care reviewed with: patient    LORIE Doll   I certify that I was present in the room directing the student in service delivery and guiding them using my skilled judgment. As the co-signing therapist I have reviewed the students documentation and am responsible for the treatment, assessment, and plan.       01/16/2017

## 2017-01-16 NOTE — NURSING
PT ramipril and venlafaxine is out of stock. I sent a message to pharmacy. Will administer to pt once I receive it.

## 2017-01-17 ENCOUNTER — OFFICE VISIT (OUTPATIENT)
Dept: ORTHOPEDICS | Facility: CLINIC | Age: 72
DRG: 559 | End: 2017-01-17
Attending: INTERNAL MEDICINE
Payer: MEDICARE

## 2017-01-17 VITALS
BODY MASS INDEX: 41.75 KG/M2 | HEIGHT: 73 IN | DIASTOLIC BLOOD PRESSURE: 72 MMHG | WEIGHT: 315 LBS | HEART RATE: 80 BPM | SYSTOLIC BLOOD PRESSURE: 118 MMHG

## 2017-01-17 DIAGNOSIS — S82.202N TRAUMATIC TYPE III OPEN FRACTURE OF SHAFT OF LEFT TIBIA AND FIBULA WITH NONUNION: Primary | ICD-10-CM

## 2017-01-17 DIAGNOSIS — S82.402N TRAUMATIC TYPE III OPEN FRACTURE OF SHAFT OF LEFT TIBIA AND FIBULA WITH NONUNION: Primary | ICD-10-CM

## 2017-01-17 DIAGNOSIS — L89.624 DECUBITUS ULCER OF LEFT HEEL, STAGE 4: ICD-10-CM

## 2017-01-17 LAB
ANION GAP SERPL CALC-SCNC: 7 MMOL/L
BUN SERPL-MCNC: 23 MG/DL
CALCIUM SERPL-MCNC: 9.5 MG/DL
CHLORIDE SERPL-SCNC: 102 MMOL/L
CO2 SERPL-SCNC: 30 MMOL/L
CREAT SERPL-MCNC: 1.4 MG/DL
EST. GFR  (AFRICAN AMERICAN): 58.8 ML/MIN/1.73 M^2
EST. GFR  (NON AFRICAN AMERICAN): 50.9 ML/MIN/1.73 M^2
GLUCOSE SERPL-MCNC: 93 MG/DL
POTASSIUM SERPL-SCNC: 4.1 MMOL/L
SODIUM SERPL-SCNC: 139 MMOL/L

## 2017-01-17 PROCEDURE — 99999 PR PBB SHADOW E&M-EST. PATIENT-LVL III: CPT | Mod: PBBFAC,,, | Performed by: ORTHOPAEDIC SURGERY

## 2017-01-17 PROCEDURE — 80048 BASIC METABOLIC PNL TOTAL CA: CPT

## 2017-01-17 PROCEDURE — 11000004 HC SNF PRIVATE

## 2017-01-17 PROCEDURE — 36415 COLL VENOUS BLD VENIPUNCTURE: CPT

## 2017-01-17 PROCEDURE — 99024 POSTOP FOLLOW-UP VISIT: CPT | Mod: S$GLB,,, | Performed by: ORTHOPAEDIC SURGERY

## 2017-01-17 PROCEDURE — 25000003 PHARM REV CODE 250: Performed by: STUDENT IN AN ORGANIZED HEALTH CARE EDUCATION/TRAINING PROGRAM

## 2017-01-17 PROCEDURE — 99499 UNLISTED E&M SERVICE: CPT | Mod: S$GLB,,, | Performed by: ORTHOPAEDIC SURGERY

## 2017-01-17 PROCEDURE — 25000003 PHARM REV CODE 250: Performed by: INTERNAL MEDICINE

## 2017-01-17 PROCEDURE — 99308 SBSQ NF CARE LOW MDM 20: CPT | Mod: ,,, | Performed by: NURSE PRACTITIONER

## 2017-01-17 PROCEDURE — 97110 THERAPEUTIC EXERCISES: CPT

## 2017-01-17 PROCEDURE — 97116 GAIT TRAINING THERAPY: CPT

## 2017-01-17 PROCEDURE — 25000003 PHARM REV CODE 250: Performed by: NURSE PRACTITIONER

## 2017-01-17 RX ADMIN — ACETAMINOPHEN 650 MG: 325 TABLET ORAL at 01:01

## 2017-01-17 RX ADMIN — PIPERACILLIN SODIUM,TAZOBACTAM SODIUM 4.5 G: 4; .5 INJECTION, POWDER, FOR SOLUTION INTRAVENOUS at 01:01

## 2017-01-17 RX ADMIN — Medication 10 ML: at 06:01

## 2017-01-17 RX ADMIN — GABAPENTIN 300 MG: 300 CAPSULE ORAL at 09:01

## 2017-01-17 RX ADMIN — METOPROLOL TARTRATE 25 MG: 25 TABLET ORAL at 08:01

## 2017-01-17 RX ADMIN — METOPROLOL TARTRATE 25 MG: 25 TABLET ORAL at 09:01

## 2017-01-17 RX ADMIN — TAMSULOSIN HYDROCHLORIDE 0.4 MG: 0.4 CAPSULE ORAL at 08:01

## 2017-01-17 RX ADMIN — GABAPENTIN 300 MG: 300 CAPSULE ORAL at 01:01

## 2017-01-17 RX ADMIN — LACTOBACILLUS ACIDOPHILUS / LACTOBACILLUS BULGARICUS 1 EACH: 100 MILLION CFU STRENGTH GRANULES at 08:01

## 2017-01-17 RX ADMIN — PANTOPRAZOLE SODIUM 40 MG: 40 TABLET, DELAYED RELEASE ORAL at 08:01

## 2017-01-17 RX ADMIN — Medication 10 ML: at 12:01

## 2017-01-17 RX ADMIN — ASPIRIN 325 MG ORAL TABLET 325 MG: 325 PILL ORAL at 08:01

## 2017-01-17 RX ADMIN — Medication 220 MG: at 08:01

## 2017-01-17 RX ADMIN — LACTOBACILLUS ACIDOPHILUS / LACTOBACILLUS BULGARICUS 1 EACH: 100 MILLION CFU STRENGTH GRANULES at 09:01

## 2017-01-17 RX ADMIN — PIPERACILLIN SODIUM,TAZOBACTAM SODIUM 4.5 G: 4; .5 INJECTION, POWDER, FOR SOLUTION INTRAVENOUS at 06:01

## 2017-01-17 RX ADMIN — GABAPENTIN 300 MG: 300 CAPSULE ORAL at 06:01

## 2017-01-17 RX ADMIN — ASPIRIN 325 MG ORAL TABLET 325 MG: 325 PILL ORAL at 09:01

## 2017-01-17 RX ADMIN — VENLAFAXINE 150 MG: 37.5 TABLET ORAL at 09:01

## 2017-01-17 RX ADMIN — STANDARDIZED SENNA CONCENTRATE AND DOCUSATE SODIUM 1 TABLET: 8.6; 5 TABLET, FILM COATED ORAL at 09:01

## 2017-01-17 RX ADMIN — OXYCODONE HYDROCHLORIDE AND ACETAMINOPHEN 1 TABLET: 10; 325 TABLET ORAL at 09:01

## 2017-01-17 RX ADMIN — PIPERACILLIN SODIUM,TAZOBACTAM SODIUM 4.5 G: 4; .5 INJECTION, POWDER, FOR SOLUTION INTRAVENOUS at 09:01

## 2017-01-17 RX ADMIN — DICLOFENAC 2 G: 10 GEL TOPICAL at 08:01

## 2017-01-17 RX ADMIN — ATORVASTATIN CALCIUM 40 MG: 20 TABLET, FILM COATED ORAL at 08:01

## 2017-01-17 NOTE — PT/OT/SLP PROGRESS
"Physical Therapy  Treatment    Janusz Montgomery Jr.   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    PT Received On: 17                     Billable Minutes:  Gait [id number]    Treatment Type: Treatment  PT/PTA: PTA     PTA Visit Number: 2       General Precautions: Standard, fall  Orthopedic Precautions: LLE non weight bearing   Braces:           Subjective:  "I have an appointment today."    Pain Ratin/10              Pain Rating Post-Intervention: 0/10    Objective:  Patient found with: peripheral IV (seated EOB)     Functional Mobility:  Bed Mobility :   Supine <> sit with mod I    Transfers:  Bed/Chair/WC: Supervision or Setup  Sit <> stand from EOB, w/c with RW and SBA    Gait:  Distance Walk: pt ambulated 60 feet x 2 trials with NWB LLE, RW and CGA for safety.  w/c and IV pole in tow  Walk: Minimal Contact Assistance  Assistive Device: rolling walker  Gait Deviation(s): decreased sonali;decreased toe-to-floor clearance;decreased weight-shifting ability;decreased velocity of limb motion;decreased step length;decreased stride length;decreased swing-to-stance ratio      Balance:   Static Sit: Normal: Patient able to maintain steady balance without handhold support.  Dynamic Sit:  Normal: Patient accepts maximal challenge and can shift weight easily within full range in all directions.  Static Stand: Good: Patient able to maintain balance without handhold support, limited postural sway  Dynamic stand: Fair: Patient accepts minimal challenge; able to maintain balance while turning head/trunk.      Patient left up in chair with all lines intact, call button in reach and RN present.    Assessment:  Janusz Montgomery Jr. is a 71 y.o. 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  Pt remains at a SBA with transfers and CGA for gait.  Pt would continue to benefit from PT " services.  Goals remain appropriate.    Rehab identified problem list/impairments: Rehab identified problem list/impairments: weakness, impaired endurance, impaired functional mobilty, impaired self care skills, gait instability, impaired balance, decreased coordination, decreased upper extremity function, decreased lower extremity function, decreased safety awareness, pain, decreased ROM, impaired coordination, orthopedic precautions    Rehab potential is fair.    Activity tolerance: Good    Discharge recommendations: Discharge Facility/Level Of Care Needs: home with home health     Barriers to discharge: Barriers to Discharge: Decreased caregiver support    Equipment recommendations: Equipment Needed After Discharge: none     GOALS:   Physical Therapy Goals        Problem: Physical Therapy Goal    Goal Priority Disciplines Outcome Goal Variances Interventions   Physical Therapy Goal     PT/OT, PT Ongoing (interventions implemented as appropriate)     Description:  Goals to be met by: 2 weeks (tentative, pending medical progress)     Patient will increase functional independence with mobility by performin. Supine to sit with Modified [place] = not met  2. Sit to supine with Modified [place]= not met  3. Sit to stand transfer with Supervision and maintaining NWB LLE= not met  4. Bed to chair transfer with Supervision using Rolling Walker and maintaining NWB LLE= not met  5. Gait  x 25 feet with Stand-by Assistance using Rolling Walker.  and maintaining NWB LLE= not met    6. Wheelchair propulsion x150 feet with Modified [place] using bilateral upper extremities= not met    7. Lower extremity exercise program x20-30 reps per handout, with assistance as needed and gym therex=  met                   PLAN:    Patient to be seen 5 x/week  to address the above listed problems via gait training, therapeutic activities, therapeutic exercises, wheelchair management/training  Plan of Care expires:     Plan of Care reviewed with: patient    Jevon Cannon, PTA  01/17/2017

## 2017-01-17 NOTE — PROGRESS NOTES
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 The patient also has a left heel ulcer. He is currently at [facility] where he is receiving IV antibiotics and daily wound care for his heel ulcer. Overall, he is doing well. Pain is 0/10.    PE:  External fixator in place; pin sites clean without erythema or drainage. Heel ulcer improved in character with granulation tissue present.    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 His heel wound is healing well. He was seen today by our wound care specialist Chana Macdonald and new wound care orders were made (Santyl and Fabienne daily). The patient's IV antibiotics will be completed on 2/2/16. Once he is off of the antibiotics for several weeks, we will plan to obtain an indium scan to r/o infection in his tibia, which will guide further surgical management.  Follow-up in 2 weeks for re-assessment of heel wound.

## 2017-01-17 NOTE — PROGRESS NOTES
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  He said he has requested a pain pill.  He is also reports stools are still lose and he has refused his stool softeners.         Pain Scale: did not quanitfy    Review of Systems:  Constitutional: no fever or chills  Respiratory: no cough or shortness of breath  Cardiovascular: no chest pain or palpitations  Gastrointestinal: positive for loose stools  Genitourinary: no hematuria or dysuria  Integument/Breast: no rash or pruritis, positive for skin ulcer to left heel  Musculoskeletal: positive for arthralgias and muscle weakness    OBJECTIVE:       Vital Signs Range (Last 24H):  Temp:  [97.1 °F (36.2 °C)-98.6 °F (37 °C)]   Pulse:  [70-97]   Resp:  [18-20]   BP: (118-125)/(57-72)   SpO2:  [92 %-98 %]     I & O (Last 24H):    Intake/Output Summary (Last 24 hours) at 01/17/17 1453  Last data filed at 01/17/17 0229   Gross per 24 hour   Intake                0 ml   Output             1525 ml   Net            -1525 ml       Physical Exam:  General: well developed, well nourished, appears stated age, no distress  Lungs:  clear to auscultation bilaterally and normal respiratory effort  Cardiovascular: Heart: regular rate and rhythm, S1, S2 normal, no murmur, click, rub or gallop. Chest Wall: no tenderness. Extremities: no cyanosis or edema, or clubbing. Pulses: 2+ and symmetric.  Abdomen/Rectal: Abdomen: soft, non-tender non-distented; bowel sounds normal; no masses,  no organomegaly. Rectal: not examined  Skin: Dressing to left heel currently CDI.  External fixator to left lower leg, there is no redness or drainage present.  Musculoskeletal:no clubbing, cyanosis    Diagnostic Results:    Recent  Labs  Lab 01/12/17  0555 01/16/17  0611   WBC 10.75 10.79   HGB 10.5* 11.4*   HCT 32.5* 35.6*    253        Recent Labs  Lab 01/12/17  0555 01/16/17  0611 01/17/17  0600    138 139   K 3.8 4.2 4.1    101 102   CO2 29 28 30*   BUN 21 24* 23   CREATININE 1.4 1.6* 1.4   GLU 80 87 93   CALCIUM 8.9 9.3 9.5        No results for input(s): INR, APTT in the last 168 hours.    Invalid input(s): PT   Microbiology Results (last 7 days)     ** No results found for the last 168 hours. **           Lab Results   Component Value Date    HGBA1C 5.3 07/11/2016     No results found for: POCTGLUCOSE    Imaging Results     None          Current Facility-Administered Medications   Medication    acetaminophen tablet 650 mg    alteplase injection 2 mg    artificial tears 0.5 % ophthalmic solution 1 drop    aspirin tablet 325 mg    atorvastatin tablet 40 mg    collagenase ointment    diclofenac sodium 1 % gel 2 g    gabapentin capsule 300 mg    lactobacillus acidophilus & bulgar 100 million cell packet 1 each    loperamide capsule 2 mg    metoprolol tartrate (LOPRESSOR) tablet 25 mg    nitroGLYCERIN SL tablet 0.4 mg    ondansetron disintegrating tablet 8 mg    oxycodone-acetaminophen  mg per tablet 1 tablet    pantoprazole EC tablet 40 mg    piperacillin-tazobactam 4.5 g in dextrose 5 % 100 mL IVPB (ready to mix system)    polyethylene glycol packet 17 g    [START ON 1/18/2017] ramipril capsule 5 mg    senna-docusate 8.6-50 mg per tablet 1 tablet    sodium chloride 0.9% flush 10 mL    And    sodium chloride 0.9% flush 10 mL    tamsulosin 24 hr capsule 0.4 mg    [START ON 1/18/2017] torsemide tablet 40 mg    venlafaxine tablet 150 mg    zinc sulfate capsule 220 mg         ASSESSMENT/PLAN:     Active Hospital Problems    Diagnosis  POA    *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  Seen by Dr. Cortés today.  His note has been reviewed.  Recommends to continue Abx and daily dressing changes to his heel.  Once completed antibiotic course, he would like him to repeat an indium scan prior to proceeding with surgery.  Ortho NP to see weekly while admitted to SNF. Nursing to continue pin site care per orders.  Pin sites remains free of redness or drainage.  Continue to encouraged patient to elevate limb when in the bed.  Not Applicable    Decubitus ulcer of left heel, stage 4 [L89.624]  Currently being followed by wound care.  Recommending daily dressing changes and continue to use Santyl and Fabienne daily to wound.  Follow up with ID tomorrow for suspected Osteomyelitis.  Target end date of IV abx is 2-9-17.    Yes    Major depressive disorder, recurrent episode, mild [F33.0]  No acute issues, continue venlafaxine 150 mg bid as ordered.  Yes    Paroxysmal atrial fibrillation - new onset after trauma [I48.0]  HR is 80, continue Lopressor 25 mg bid for rate control.  Continue  mg bid.  Yes    BPH with urinary obstruction [N40.1, N13.8]  No acute issues, continue flomax 0.4 mg daily.  Yes    GERD (gastroesophageal reflux disease) [K21.9]  No acute issues, continue Protonix 40 mg daily.  Yes    Hyperlipidemia [E78.5]  No acute issues, continue atorvastatin 40 mg daily.  Yes    Essential hypertension [I10]  BP is 118/72, continue Lopressor 25 mg bid and Altace 5 mg daily.  Yes    Coronary artery disease involving native coronary artery of native heart without angina pectoris s/p RCA stent [I25.10]  No complaints of chest pain or SOB.  Continue ACE and b-blocker  Yes    Obstructive sleep apnea on CPAP [G47.33]  Patient to continue to wear home CPAP per settings.  Yes      Resolved Hospital Problems    Diagnosis Date Resolved POA   No resolved problems to display.   Diarrhea - stool for C.diff was negative.  He is reporting soft stools, will discontinue  Miralax and continue Senokot-S as ordered.  Continue Lomotil PRN.      NAT - Labs obtained yesterday showed a bump in his BUN/Cr (24/1.6).  Dose of Torsemide and Altace was held for 24 hours .  BMP repeated today, BUN/Cr (23/1.4).  Will continue to monitor.      Suspected Osteoarthritis of shoulders - improved with Voltaren cream, continue for now.        Future Appointments  Date Time Provider Department Center   1/18/2017 11:00 AM Benny Cannon PA-C [facility] ID [facility]   1/31/2017 9:00 AM Walter Cortés MD [facility] ORTHO [facility]       DVT Prophylaxis:  mg bid      JIMMY Moses, FNP-BC

## 2017-01-17 NOTE — NURSING
Patient left unit via [employer] wheelchair transportation to attend Ortho appointment at [facility]. VSS, no c/o pain or distress at this time.

## 2017-01-17 NOTE — PT/OT/SLP PROGRESS
Occupational Therapy  Treatment    Janusz Montgomery Jr.   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    Pain Ratin/10                   Objective:  Patient found with: PICC line, peripheral IV (ext fixator on EOB)    Functional Mobility:   Scooting: Modified Independent    Functional Status:  Bed/Chair/WC: Supervision or Setup (setup w/c --bed to w/c; w/c to and from mat)      Balance:   Static Sit: Normal: Patient able to maintain steady balance without handhold support.  Dynamic Sit: Normal: Patient accepts maximal challenge and can shift weight easily within full range in all directions.  Static Stand: Good: Patient able to maintain balance without handhold support, limited postural sway  Dynamic Stand: Fair: Patient accepts minimal challenge; able to maintain balance while turning head/trunk.        Additional Treatment:  Bridging on mat with LLE on red theraball 10reps x 3 sets  Crunches supine 15 x 2  Chest press supine with 20# bar 15 x 2  Bicep curls seated with 9# dumbells 10 x 3(modification for LUE due to shoulder)  Pt indep with w/c ;mobility--assist with IV pole from therapist from room to gym    Patient left up in chair with PT in gym    ASSESSMENT:  Pt tolerated tx and demonstrated increased UE strength and indep with t/f's    Rehab identified problem list/impairments: Rehab identified problem list/impairments: weakness, impaired endurance, impaired self care skills, impaired functional mobilty, gait instability, decreased lower extremity function, decreased coordination, impaired  balance, decreased ROM, orthopedic precautions    Rehab potential is good    Activity tolerance: Good    Discharge recommendations: home with home health     Barriers to discharge: Barriers to Discharge: Decreased caregiver support     Equipment recommendations: walker, rolling, wheelchair     GOALS:   Occupational Therapy Goals        Problem: Occupational Therapy Goal    Goal Priority Disciplines Outcome Interventions   Occupational Therapy Goal     OT, PT/OT Ongoing (interventions implemented as appropriate)    Description:  Goals to be met by: 14 days     Patient will increase functional independence with ADLs by performing:    LE Dressing with Set-up Assistance and Assistive Devices as needed.  Toileting from bedside commode with Minimal Assistance for hygiene and clothing management.   Bathing from  edge of bed with Minimal Assistance.  Stand pivot transfers with Modified [place].  Toilet transfer to bedside commode with Modified [place].                Plan:  Patient to be seen 5 x/week to address the above listed problems via self-care/home management, therapeutic activities, therapeutic exercises  Plan of Care reviewed with: patient    NIK Munson  01/17/2017

## 2017-01-17 NOTE — PLAN OF CARE
Problem: Physical Therapy Goal  Goal: Physical Therapy Goal  Goals to be met by: 2 weeks (tentative, pending medical progress)     Patient will increase functional independence with mobility by performin. Supine to sit with Modified [place] = not met  2. Sit to supine with Modified [place]= not met  3. Sit to stand transfer with Supervision and maintaining NWB LLE= not met  4. Bed to chair transfer with Supervision using Rolling Walker and maintaining NWB LLE= not met  5. Gait x 25 feet with Stand-by Assistance using Rolling Walker. and maintaining NWB LLE= not met    6. Wheelchair propulsion x150 feet with Modified [place] using bilateral upper extremities= not met    7. Lower extremity exercise program x20-30 reps per handout, with assistance as needed and gym therex= met   Outcome: Ongoing (interventions implemented as appropriate)  Goals remain appropriate.

## 2017-01-17 NOTE — PLAN OF CARE
Problem: Occupational Therapy Goal  Goal: Occupational Therapy Goal  Goals to be met by: 14 days     Patient will increase functional independence with ADLs by performing:    LE Dressing with Set-up Assistance and Assistive Devices as needed.  Toileting from bedside commode with Minimal Assistance for hygiene and clothing management.   Bathing from edge of bed with Minimal Assistance.  Stand pivot transfers with Modified [place].  Toilet transfer to bedside commode with Modified [place].   Outcome: Ongoing (interventions implemented as appropriate)  Progressing toward goals. NIK Munson  1/17/2017

## 2017-01-17 NOTE — PLAN OF CARE
Problem: Patient Care Overview  Goal: Plan of Care Review  Pt free of fall and injury. No complaints of pain. piperacillin still infusing. picc line flushed. Wound care to left heel done. Pin care site done. Pt sitting on side of bed. Bed in lowest position, call light in reach, will continue to monitor.     Problem: Infection, Risk/Actual (Adult)  Goal: Infection Prevention/Resolution  Patient will demonstrate the desired outcomes by discharge/transition of care.     01/07/17 1203   Infection, Risk/Actual (Adult)   Infection Prevention/Resolution making progress toward outcome         Problem: Fall Risk (Adult)  Goal: Absence of Falls  Patient will demonstrate the desired outcomes by discharge/transition of care.     01/16/17 1855   Fall Risk (Adult)   Absence of Falls making progress toward outcome

## 2017-01-18 ENCOUNTER — PATIENT MESSAGE (OUTPATIENT)
Dept: INFECTIOUS DISEASES | Facility: CLINIC | Age: 72
End: 2017-01-18

## 2017-01-18 ENCOUNTER — OFFICE VISIT (OUTPATIENT)
Dept: INFECTIOUS DISEASES | Facility: CLINIC | Age: 72
DRG: 559 | End: 2017-01-18
Attending: INTERNAL MEDICINE
Payer: MEDICARE

## 2017-01-18 VITALS — BODY MASS INDEX: 41.75 KG/M2 | TEMPERATURE: 98 F | WEIGHT: 315 LBS | HEIGHT: 73 IN

## 2017-01-18 DIAGNOSIS — L97.529 FOOT ULCER, LEFT, WITH UNSPECIFIED SEVERITY: Primary | ICD-10-CM

## 2017-01-18 PROCEDURE — 1159F MED LIST DOCD IN RCRD: CPT | Mod: S$GLB,,, | Performed by: PHYSICIAN ASSISTANT

## 2017-01-18 PROCEDURE — 97110 THERAPEUTIC EXERCISES: CPT

## 2017-01-18 PROCEDURE — 97530 THERAPEUTIC ACTIVITIES: CPT

## 2017-01-18 PROCEDURE — 1160F RVW MEDS BY RX/DR IN RCRD: CPT | Mod: S$GLB,,, | Performed by: PHYSICIAN ASSISTANT

## 2017-01-18 PROCEDURE — 1157F ADVNC CARE PLAN IN RCRD: CPT | Mod: S$GLB,,, | Performed by: PHYSICIAN ASSISTANT

## 2017-01-18 PROCEDURE — 1125F AMNT PAIN NOTED PAIN PRSNT: CPT | Mod: S$GLB,,, | Performed by: PHYSICIAN ASSISTANT

## 2017-01-18 PROCEDURE — 3078F DIAST BP <80 MM HG: CPT | Mod: S$GLB,,, | Performed by: PHYSICIAN ASSISTANT

## 2017-01-18 PROCEDURE — 99212 OFFICE O/P EST SF 10 MIN: CPT | Mod: S$GLB,,, | Performed by: PHYSICIAN ASSISTANT

## 2017-01-18 PROCEDURE — 99999 PR PBB SHADOW E&M-EST. PATIENT-LVL IV: CPT | Mod: PBBFAC,,, | Performed by: PHYSICIAN ASSISTANT

## 2017-01-18 PROCEDURE — 3074F SYST BP LT 130 MM HG: CPT | Mod: S$GLB,,, | Performed by: PHYSICIAN ASSISTANT

## 2017-01-18 PROCEDURE — 25000003 PHARM REV CODE 250: Performed by: INTERNAL MEDICINE

## 2017-01-18 PROCEDURE — 25000003 PHARM REV CODE 250: Performed by: NURSE PRACTITIONER

## 2017-01-18 PROCEDURE — 25000003 PHARM REV CODE 250: Performed by: STUDENT IN AN ORGANIZED HEALTH CARE EDUCATION/TRAINING PROGRAM

## 2017-01-18 PROCEDURE — 11000004 HC SNF PRIVATE

## 2017-01-18 PROCEDURE — 99499 UNLISTED E&M SERVICE: CPT | Mod: S$GLB,,, | Performed by: PHYSICIAN ASSISTANT

## 2017-01-18 RX ADMIN — VENLAFAXINE 150 MG: 37.5 TABLET ORAL at 09:01

## 2017-01-18 RX ADMIN — ATORVASTATIN CALCIUM 40 MG: 20 TABLET, FILM COATED ORAL at 09:01

## 2017-01-18 RX ADMIN — ASPIRIN 325 MG ORAL TABLET 325 MG: 325 PILL ORAL at 09:01

## 2017-01-18 RX ADMIN — STANDARDIZED SENNA CONCENTRATE AND DOCUSATE SODIUM 1 TABLET: 8.6; 5 TABLET, FILM COATED ORAL at 09:01

## 2017-01-18 RX ADMIN — LACTOBACILLUS ACIDOPHILUS / LACTOBACILLUS BULGARICUS 1 EACH: 100 MILLION CFU STRENGTH GRANULES at 09:01

## 2017-01-18 RX ADMIN — GABAPENTIN 300 MG: 300 CAPSULE ORAL at 09:01

## 2017-01-18 RX ADMIN — GABAPENTIN 300 MG: 300 CAPSULE ORAL at 05:01

## 2017-01-18 RX ADMIN — RAMIPRIL 5 MG: 2.5 CAPSULE ORAL at 09:01

## 2017-01-18 RX ADMIN — PIPERACILLIN SODIUM,TAZOBACTAM SODIUM 4.5 G: 4; .5 INJECTION, POWDER, FOR SOLUTION INTRAVENOUS at 09:01

## 2017-01-18 RX ADMIN — METOPROLOL TARTRATE 25 MG: 25 TABLET ORAL at 09:01

## 2017-01-18 RX ADMIN — DICLOFENAC 2 G: 10 GEL TOPICAL at 09:01

## 2017-01-18 RX ADMIN — PANTOPRAZOLE SODIUM 40 MG: 40 TABLET, DELAYED RELEASE ORAL at 09:01

## 2017-01-18 RX ADMIN — Medication 10 ML: at 06:01

## 2017-01-18 RX ADMIN — TAMSULOSIN HYDROCHLORIDE 0.4 MG: 0.4 CAPSULE ORAL at 09:01

## 2017-01-18 RX ADMIN — GABAPENTIN 300 MG: 300 CAPSULE ORAL at 02:01

## 2017-01-18 RX ADMIN — PIPERACILLIN SODIUM,TAZOBACTAM SODIUM 4.5 G: 4; .5 INJECTION, POWDER, FOR SOLUTION INTRAVENOUS at 05:01

## 2017-01-18 RX ADMIN — Medication 220 MG: at 09:01

## 2017-01-18 RX ADMIN — COLLAGENASE SANTYL: 250 OINTMENT TOPICAL at 01:01

## 2017-01-18 RX ADMIN — TORSEMIDE 40 MG: 20 TABLET ORAL at 09:01

## 2017-01-18 RX ADMIN — Medication 10 ML: at 05:01

## 2017-01-18 RX ADMIN — Medication 10 ML: at 12:01

## 2017-01-18 NOTE — LETTER
January 18, 2017      Walter Cortés MD  [address]  [facility] [address]           [facility] - Infectious Diseases  [address]  [facility] [address]  Phone: [phone]  Fax: [phone]          Patient: Janusz Montgomery Jr.   MR Number: [id number]   YOB: 1945   Date of Visit: 1/18/2017       Dear Dr. Walter Cortés:    Thank you for referring Janusz Montgomery to me for evaluation. Attached you will find relevant portions of my assessment and plan of care.    If you have questions, please do not hesitate to call me. I look forward to following Janusz Montgomery along with you.    Sincerely,    Benny Cannon PA-C    Enclosure  CC:  No Recipients    If you would like to receive this communication electronically, please contact [email or web address] or [phone] to request more information on [employer] Link access.    For providers and/or their staff who would like to refer a patient to [facility], please contact us through our one-stop-shop provider referral line, [facility], at [phone].    If you feel you have received this communication in error or would no longer like to receive these types of communications, please e-mail [email or web address]

## 2017-01-18 NOTE — PROGRESS NOTES
The left heel pressure injury- unstagable due to slough covering a ~ 25% of the wound- 75% is pink/moist.  Cleaned with normal saline, Santyl applied over the slough, kirsten placed in wound bed, covered with moist gauze to activate the Santyl, then a mepore dressing/tape.  Spoke with Chana, RN, WOCN at [facility] regarding wound care.  Will change to Mon-Wed-Fri.  Nursing to continue care.  Spoke to wife via phone to discuss wound care instructions- understanding verbalized per patient and wife.  Supplies at bedside.        01/18/17 1300       Pressure Ulcer 12/28/16 Left heel Stage IV   Date First Assessed: 12/28/16   Pressure Ulcer Present on Admission: (c) yes  Side: Left  Location: heel  Staging: Stage IV   Staging Unstageable   Healing Pressure Ulcer yes   Pressure Ulcer Risk Factors shear/friction;nutrition;mobility;moisture   Dressing Appearance intact   Drainage Amount none   Appearance pink;moist;slough  (slough ~ 25% at 12:00)   Periwound Area dry;intact   Wound Edges open   Cleansed W/ sterile normal saline   Interventions enzymatic debriding agent (Santyl);promogran (matrix wound dressing)   Dressing Dressing changed;gauze, wet-to-dry;telfa island dressing   Dressing Change Due 01/20/17

## 2017-01-18 NOTE — PLAN OF CARE
Problem: Occupational Therapy Goal  Goal: Occupational Therapy Goal  Goals to be met by: 14 days     Patient will increase functional independence with ADLs by performing:    LE Dressing with Set-up Assistance and Assistive Devices as needed.  Toileting from bedside commode with Minimal Assistance for hygiene and clothing management. --met  Bathing from edge of bed with Minimal Assistance.  Stand pivot transfers with Modified [place].--met squat pivot--cont to address stand with RW  Toilet transfer to bedside commode with Modified [place].   Outcome: Ongoing (interventions implemented as appropriate)  Progressing toward goals. NIK Munson  1/18/2017

## 2017-01-18 NOTE — PLAN OF CARE
Problem: Patient Care Overview  Goal: Plan of Care Review  Outcome: Ongoing (interventions implemented as appropriate)  Ptg a a o x 3, vss, resp effort even and unlabored. Voids per urinal. Pin care done. Call bell within reach, side rail sup x 3, Pt has remained free fro falls. Will continue to monitor.    01/18/17 3684   Coping/Psychosocial   Plan Of Care Reviewed With patient

## 2017-01-18 NOTE — PT/OT/SLP PROGRESS
Physical Therapy  Treatment    Janusz Montgomery Jr.   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    Pain Ratin/10     Location - Orientation: lower  Location: back  Pain Addressed: Distraction, Cessation of Activity  Pain Rating Post-Intervention: 5/10    Objective:  Patient found with: PICC line     Functional Mobility:  Bed Mobility :   Supine to sit: Modified Independent   Sit to supine: Modified Independent   Rolling: Modified Independent   Scooting: Modified Independent    Functional Status:  Bed/Chair/WC: Modified [place] (w/c<>mat w/ mod(I))  Distance Walk: pt refused ambulation due to back pain and concern of 'jarring' w/ hopping step  Walk: Minimal Contact Assistance  Assistive Device: rolling walker  Gait Deviation(s): decreased sonali;decreased step length;decreased stride length  Wheelchair: Modified [place]  Distance Wheelchair: propels on unit     PT Exercises: Ankle pumps 30  Quad sets 30  Glut sets 30  Short arc quads 30  Heel slides 20  ABduction 20  ADduction 20    Additional Treatment:  LBE x20 min    Patient left up in chair with call button in reach.    Assessment:  Janusz Montgomery Jr. is a 71 y.o. 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 Pt c/o LBP and did not ambulate today. Patient will benefit from continued physical therapy to address deficits and improve safety and functional mobility. Continue with physical therapy plan of care.   .    Rehab identified  problem list/impairments: Rehab identified problem list/impairments: weakness, impaired endurance, gait instability, impaired balance, pain, decreased lower extremity function, orthopedic precautions, impaired functional mobilty    Rehab potential is good.    Activity tolerance: Good    Discharge recommendations: Discharge Facility/Level Of Care Needs: home with home health     Barriers to discharge: Barriers to Discharge: Decreased caregiver support    Equipment recommendations: Equipment Needed After Discharge: wheelchair, walker, rolling     GOALS:   Physical Therapy Goals        Problem: Physical Therapy Goal    Goal Priority Disciplines Outcome Goal Variances Interventions   Physical Therapy Goal     PT/OT, PT Ongoing (interventions implemented as appropriate)     Description:  Goals to be met by: 2 weeks (tentative, pending medical progress)     Patient will increase functional independence with mobility by performin. Supine to sit with Modified [place] = not met  2. Sit to supine with Modified [place]= not met  3. Sit to stand transfer with Supervision and maintaining NWB LLE= not met  4. Bed to chair transfer with Supervision using Rolling Walker and maintaining NWB LLE= not met  5. Gait  x 25 feet with Stand-by Assistance using Rolling Walker.  and maintaining NWB LLE= not met    6. Wheelchair propulsion x150 feet with Modified [place] using bilateral upper extremities= not met    7. Lower extremity exercise program x20-30 reps per handout, with assistance as needed and gym therex=  met                   PLAN:    Patient to be seen 5 x/week  to address the above listed problems via gait training, therapeutic activities, therapeutic exercises, wheelchair management/training  Plan of Care expires:    Plan of Care reviewed with: patient    Georgiamercedes Andrea, PT  2017

## 2017-01-18 NOTE — PROGRESS NOTES
Pharmacy called to have Pipperacillin 4.5 G delivered to SNF medication due at 2 pm.  Pharmacist stated he will send medication.

## 2017-01-18 NOTE — MR AVS SNAPSHOT
[facility] - Infectious Diseases  [address]  [facility] [address]  Phone: [phone]  Fax: [phone]                  Janusz Montgomery Jr.   2017 11:00 AM   Office Visit    Description:  Male : 1945   Provider:  Benny Cannon PA-C   Department:  [facility] - Infectious Diseases           Reason for Visit     Follow-up                To Do List           Future Appointments        Provider Department Dept Phone    2017 9:00 AM MD [facility] - Orthopedics [phone]    2017 1:00 PM KAMAR Fierro [facility] Infectious Diseases [phone]      Goals (5 Years of Data)     None      Follow-Up and Disposition     Return in about 3 weeks (around 2017), or if symptoms worsen or fail to improve.      [facility]     [facility] On [facility] Nurse Care Line -  Assistance  Registered nurses in the [facility] Center provide clinical advisement, health education, appointment booking, and other advisory services.  Call for this free service at [phone].             Medications           Message regarding Medications     Verify the changes and/or additions to your medication regime listed below are the same as discussed with your clinician today.  If any of these changes or additions are incorrect, please notify your healthcare provider.             Verify that the below list of medications is an accurate representation of the medications you are currently taking.  If none reported, the list may be blank. If incorrect, please contact your healthcare provider. Carry this list with you in case of emergency.           Current Medications     ACYCLOVIR ORAL Take 800 mg by mouth once daily.     aspirin 325 MG tablet Take 1 tablet (325 mg total) by mouth 2 (two) times daily.    atorvastatin (LIPITOR) 40 MG tablet TAKE ONE TABLET BY MOUTH ONCE DAILY    CYANOCOBALAMIN, VITAMIN B-12, (VITAMIN B-12 ORAL) Take 2,500 mcg by mouth once daily.    docusate sodium  "(COLACE) 100 MG capsule Take 1 capsule (100 mg total) by mouth 2 (two) times daily.    gabapentin (NEURONTIN) 300 MG capsule Take 1 capsule (300 mg total) by mouth 3 (three) times daily.    melatonin 3 mg Tab Take 6 mg by mouth nightly as needed.    metoprolol tartrate (LOPRESSOR) 25 MG tablet Take 1 tablet (25 mg total) by mouth 2 (two) times daily.    nitroGLYCERIN (NITROSTAT) 0.4 MG SL tablet Place 1 tablet (0.4 mg total) under the tongue every 5 (five) minutes as needed for Chest pain.    omeprazole (PRILOSEC) 20 MG capsule Take 2 capsules (40 mg total) by mouth once daily.    oxycodone (ROXICODONE) 5 MG immediate release tablet Take 1 tablet (5 mg total) by mouth every 4 (four) hours as needed for Pain.    oxycodone-acetaminophen (PERCOCET)  mg per tablet Take 1 tablet by mouth every 4 (four) hours as needed for Pain.    promethazine (PHENERGAN) 12.5 MG Tab Take 1 tablet (12.5 mg total) by mouth every 6 (six) hours as needed.    ramipril (ALTACE) 5 MG capsule TAKE ONE CAPSULE BY MOUTH ONCE DAILY    tamsulosin (FLOMAX) 0.4 mg Cp24 Take 1 capsule (0.4 mg total) by mouth once daily.    teriparatide (FORTEO) 20 mcg/dose - 600 mcg/2.4 mL PnIj Inject 0.08 mLs (20 mcg total) into the skin once daily.    torsemide (DEMADEX) 20 MG Tab Take 2 tablets (40 mg total) by mouth once daily.    venlafaxine (EFFEXOR) 75 MG tablet Take 2 tablets (150 mg total) by mouth 2 (two) times daily.    vitamin D 1000 units Tab Take 1 tablet (1,000 Units total) by mouth once daily.           Clinical Reference Information           Vital Signs - Last Recorded  Most recent update: 1/18/2017 11:13 AM by Mag Gamble MA    [place] Ht Wt BMI       98.3 °F (36.8 °C) (Oral) 6' 0.83" (1.85 m) (!) 149.7 kg (330 lb) 43.74 kg/m2       Allergies as of 1/18/2017     Bacitracin      Immunizations Administered on Date of Encounter - 1/18/2017     None      Advance Directives     An advance directive is a document which, in the event you are no " longer able to make decisions for yourself, tells your healthcare team what kind of treatment you do or do not want to receive, or who you would like to make those decisions for you.  If you do not currently have an advance directive, [facility] encourages you to create one.  For more information call:  [phone]), [phone] ([phone]),  or log on to [email or web address].

## 2017-01-18 NOTE — PT/OT/SLP PROGRESS
Occupational Therapy  Treatment    Janusz Montgomery Jr.   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    Pain Ratin/10           Pain Addressed: Pre-medicate for activity  Pain Rating Post-Intervention: 0/10    Objective:  Patient found with: PICC line (supine ext fixator)    Functional Mobility:  Bed Mobility:   Supine to sit: Modified Independent   Sit to supine: Activity did not occur   Rolling: Modified Independent   Scooting: Modified Independent    Functional Status:  Toileting: Modified [place] (urinal EOB)  Bed/Chair/WC: Modified [place] (bed to w/c; w/c to and from mat no AD)      Balance:   Static Sit: Normal: Patient able to maintain steady balance without handhold support.  Dynamic Sit: Good: Patient accepts moderate challenge; able to maintain balance while picking object off floor.  Static Stand: Fair: Patient able to maintain balance with handhold support; may require occasional minimal assistance.  Dynamic Stand: Fair: Patient accepts minimal challenge; able to maintain balance while turning head/trunk.    OT Exercises: Lat pull downs 45$ 15 X 3  Rowing 45# 15 X 2    Additional Treatment:  BICEP curls 15# 15 x 2  triceps press 45# 15 x 1; 10 x 2  Bridging on mat supine with LLE on red theraball 15 x 3  Crunches supine 15 x 3  Lower abs--B LE straight leg raises x 10 supine  W/c mobility from room to gym with mod I and use of RLE and BUE's as needed    Patient left up in chair with PT in gyn    ASSESSMENT:  Pt tolerated tx  and demonstrated increased UE strength and endurance throughout UE/core program. Pt cont to benefit from OT to increase functional indep and safety to return home with family    Rehab identified problem list/impairments: Rehab identified problem list/impairments: weakness, impaired endurance, impaired self care skills, impaired balance, gait instability, impaired functional mobilty, decreased coordination, decreased lower extremity function, decreased ROM, pain, edema, impaired skin, orthopedic precautions    Rehab potential is good    Activity tolerance: Good    Discharge recommendations: home with home health     Barriers to discharge: Barriers to Discharge: Decreased caregiver support     Equipment recommendations: wheelchair, walker, rolling     GOALS:   Occupational Therapy Goals        Problem: Occupational Therapy Goal    Goal Priority Disciplines Outcome Interventions   Occupational Therapy Goal     OT, PT/OT Ongoing (interventions implemented as appropriate)    Description:  Goals to be met by: 14 days     Patient will increase functional independence with ADLs by performing:    LE Dressing with Set-up Assistance and Assistive Devices as needed.  Toileting from bedside commode with Minimal Assistance for hygiene and clothing management. --met  Bathing from  edge of bed with Minimal Assistance.  Stand pivot transfers with Modified [place].--met squat pivot--cont to address stand with RW  Toilet transfer to bedside commode with Modified [place].                 Plan:  Patient to be seen 5 x/week to address the above listed problems via self-care/home management, therapeutic activities, therapeutic exercises  Plan of Care reviewed with: patient    NIK Munson  01/18/2017

## 2017-01-18 NOTE — PROGRESS NOTES
Patient at therapy--seen by ortho yesterday with new orders.  Also being followed by Sabrina Hsu NP.
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
## 2017-01-18 NOTE — PLAN OF CARE
Problem: Fall Risk (Adult)  Goal: Absence of Falls  Patient will demonstrate the desired outcomes by discharge/transition of care.   Outcome: Ongoing (interventions implemented as appropriate)    01/18/17 1711   Fall Risk (Adult)   Absence of Falls making progress toward outcome                              Problem: Pressure Ulcer Risk (Isidoro Scale) (Adult,Obstetrics,Pediatric)  Goal: Skin Integrity  Patient will demonstrate the desired outcomes by discharge/transition of care.   Outcome: Ongoing (interventions implemented as appropriate)    01/18/17 1711   Pressure Ulcer Risk (Isidoro Scale) (Adult,Obstetrics,Pediatric)   Skin Integrity making progress toward outcome         Problem: Fracture Orthopaedic (Adult)  Goal: Signs and Symptoms of Listed Potential Problems Will be Absent, Minimized or Managed (Fracture Orthopaedic)  Signs and symptoms of listed potential problems will be absent, minimized or managed by discharge/transition of care (reference Fracture Orthopaedic (Adult) CPG).   Outcome: Ongoing (interventions implemented as appropriate)    01/18/17 1711   Fracture Orthopaedic   Problems Assessed (Orthopaedic Fracture) all   Problems Present (Orthopaedic Fracture) none         Comments:   Patient monitored every 1 to 2 hours for pain and safety.  Safety maintained.  Patient instructed to call for assistance.Call  Light and persoanl items in reach.

## 2017-01-19 LAB
ALBUMIN SERPL BCP-MCNC: 3.3 G/DL
ALP SERPL-CCNC: 114 U/L
ALT SERPL W/O P-5'-P-CCNC: 15 U/L
ANION GAP SERPL CALC-SCNC: 9 MMOL/L
AST SERPL-CCNC: 16 U/L
BASOPHILS # BLD AUTO: 0.11 K/UL
BASOPHILS NFR BLD: 1.1 %
BILIRUB SERPL-MCNC: 0.5 MG/DL
BUN SERPL-MCNC: 24 MG/DL
CALCIUM SERPL-MCNC: 9.2 MG/DL
CHLORIDE SERPL-SCNC: 103 MMOL/L
CO2 SERPL-SCNC: 28 MMOL/L
CREAT SERPL-MCNC: 1.4 MG/DL
CRP SERPL-MCNC: 12.4 MG/L
DIFFERENTIAL METHOD: ABNORMAL
EOSINOPHIL # BLD AUTO: 0.9 K/UL
EOSINOPHIL NFR BLD: 8.3 %
ERYTHROCYTE [DISTWIDTH] IN BLOOD BY AUTOMATED COUNT: 14.1 %
ERYTHROCYTE [SEDIMENTATION RATE] IN BLOOD BY WESTERGREN METHOD: 73 MM/HR
EST. GFR  (AFRICAN AMERICAN): 58.8 ML/MIN/1.73 M^2
EST. GFR  (NON AFRICAN AMERICAN): 50.9 ML/MIN/1.73 M^2
GLUCOSE SERPL-MCNC: 78 MG/DL
HCT VFR BLD AUTO: 33.5 %
HGB BLD-MCNC: 10.7 G/DL
LYMPHOCYTES # BLD AUTO: 2.6 K/UL
LYMPHOCYTES NFR BLD: 24.9 %
MCH RBC QN AUTO: 32 PG
MCHC RBC AUTO-ENTMCNC: 31.9 %
MCV RBC AUTO: 100 FL
MONOCYTES # BLD AUTO: 0.8 K/UL
MONOCYTES NFR BLD: 7.4 %
NEUTROPHILS # BLD AUTO: 6.1 K/UL
NEUTROPHILS NFR BLD: 58.3 %
PLATELET # BLD AUTO: 229 K/UL
PMV BLD AUTO: 10.9 FL
POTASSIUM SERPL-SCNC: 3.7 MMOL/L
PROT SERPL-MCNC: 7.1 G/DL
RBC # BLD AUTO: 3.34 M/UL
SODIUM SERPL-SCNC: 140 MMOL/L
WBC # BLD AUTO: 10.42 K/UL

## 2017-01-19 PROCEDURE — 86140 C-REACTIVE PROTEIN: CPT

## 2017-01-19 PROCEDURE — 80053 COMPREHEN METABOLIC PANEL: CPT

## 2017-01-19 PROCEDURE — 97110 THERAPEUTIC EXERCISES: CPT

## 2017-01-19 PROCEDURE — 25000003 PHARM REV CODE 250: Performed by: STUDENT IN AN ORGANIZED HEALTH CARE EDUCATION/TRAINING PROGRAM

## 2017-01-19 PROCEDURE — 25000003 PHARM REV CODE 250: Performed by: NURSE PRACTITIONER

## 2017-01-19 PROCEDURE — 97535 SELF CARE MNGMENT TRAINING: CPT

## 2017-01-19 PROCEDURE — 85651 RBC SED RATE NONAUTOMATED: CPT

## 2017-01-19 PROCEDURE — 36415 COLL VENOUS BLD VENIPUNCTURE: CPT

## 2017-01-19 PROCEDURE — 11000004 HC SNF PRIVATE

## 2017-01-19 PROCEDURE — 97116 GAIT TRAINING THERAPY: CPT

## 2017-01-19 PROCEDURE — 25000003 PHARM REV CODE 250: Performed by: INTERNAL MEDICINE

## 2017-01-19 PROCEDURE — 85025 COMPLETE CBC W/AUTO DIFF WBC: CPT

## 2017-01-19 RX ADMIN — VENLAFAXINE 150 MG: 37.5 TABLET ORAL at 09:01

## 2017-01-19 RX ADMIN — GABAPENTIN 300 MG: 300 CAPSULE ORAL at 02:01

## 2017-01-19 RX ADMIN — ASPIRIN 325 MG ORAL TABLET 325 MG: 325 PILL ORAL at 10:01

## 2017-01-19 RX ADMIN — LACTOBACILLUS ACIDOPHILUS / LACTOBACILLUS BULGARICUS 1 EACH: 100 MILLION CFU STRENGTH GRANULES at 10:01

## 2017-01-19 RX ADMIN — TAMSULOSIN HYDROCHLORIDE 0.4 MG: 0.4 CAPSULE ORAL at 12:01

## 2017-01-19 RX ADMIN — METOPROLOL TARTRATE 25 MG: 25 TABLET ORAL at 09:01

## 2017-01-19 RX ADMIN — STANDARDIZED SENNA CONCENTRATE AND DOCUSATE SODIUM 1 TABLET: 8.6; 5 TABLET, FILM COATED ORAL at 10:01

## 2017-01-19 RX ADMIN — METOPROLOL TARTRATE 25 MG: 25 TABLET ORAL at 10:01

## 2017-01-19 RX ADMIN — LACTOBACILLUS ACIDOPHILUS / LACTOBACILLUS BULGARICUS 1 EACH: 100 MILLION CFU STRENGTH GRANULES at 09:01

## 2017-01-19 RX ADMIN — PANTOPRAZOLE SODIUM 40 MG: 40 TABLET, DELAYED RELEASE ORAL at 10:01

## 2017-01-19 RX ADMIN — Medication 220 MG: at 10:01

## 2017-01-19 RX ADMIN — TORSEMIDE 40 MG: 20 TABLET ORAL at 10:01

## 2017-01-19 RX ADMIN — Medication 10 ML: at 12:01

## 2017-01-19 RX ADMIN — Medication 10 ML: at 06:01

## 2017-01-19 RX ADMIN — ATORVASTATIN CALCIUM 40 MG: 20 TABLET, FILM COATED ORAL at 10:01

## 2017-01-19 RX ADMIN — GABAPENTIN 300 MG: 300 CAPSULE ORAL at 09:01

## 2017-01-19 RX ADMIN — GABAPENTIN 300 MG: 300 CAPSULE ORAL at 05:01

## 2017-01-19 RX ADMIN — RAMIPRIL 5 MG: 2.5 CAPSULE ORAL at 10:01

## 2017-01-19 RX ADMIN — POLYETHYLENE GLYCOL 3350 17 G: 17 POWDER, FOR SOLUTION ORAL at 10:01

## 2017-01-19 RX ADMIN — Medication 10 ML: at 05:01

## 2017-01-19 RX ADMIN — PIPERACILLIN SODIUM,TAZOBACTAM SODIUM 4.5 G: 4; .5 INJECTION, POWDER, FOR SOLUTION INTRAVENOUS at 11:01

## 2017-01-19 RX ADMIN — VENLAFAXINE 150 MG: 37.5 TABLET ORAL at 10:01

## 2017-01-19 RX ADMIN — DICLOFENAC 2 G: 10 GEL TOPICAL at 10:01

## 2017-01-19 RX ADMIN — PIPERACILLIN SODIUM,TAZOBACTAM SODIUM 4.5 G: 4; .5 INJECTION, POWDER, FOR SOLUTION INTRAVENOUS at 04:01

## 2017-01-19 RX ADMIN — COLLAGENASE SANTYL: 250 OINTMENT TOPICAL at 10:01

## 2017-01-19 RX ADMIN — ASPIRIN 325 MG ORAL TABLET 325 MG: 325 PILL ORAL at 09:01

## 2017-01-19 RX ADMIN — Medication 10 ML: at 11:01

## 2017-01-19 RX ADMIN — PIPERACILLIN SODIUM,TAZOBACTAM SODIUM 4.5 G: 4; .5 INJECTION, POWDER, FOR SOLUTION INTRAVENOUS at 05:01

## 2017-01-19 NOTE — PT/OT/SLP PROGRESS
Occupational Therapy  Treatment    Janusz Montgomery Jr.   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 Pt indicated he needed to talk to nurse as 2 o'clock IV line was past due. Nurse notified.    Pain Ratin/10       Objective:  Patient found with: PICC line (on toilet in room; external fixator)      Functional Status:  Toileting: Minimal Contact Assistance (min A for BM hygeine )  Bed/Chair/WC: Modified [place] (w/c to and from mat no AD)  Toilet: Modified [place] (toilet transfer to w/c)     Balance:   Static Sit: Normal: Patient able to maintain steady balance without handhold support.  Dynamic Sit: Normal: Patient accepts maximal challenge and can shift weight easily within full range in all directions.  Static Stand: Fair: Patient able to maintain balance with handhold support; may require occasional minimal assistance.  Dynamic Stand: Fair: Patient accepts minimal challenge; able to maintain balance while turning head/trunk.    OT Exercises: UE Ergometer 10 mins  Lat pull downs 45#: 15reps x 2,   50#: 15reps x1   Chest press: 20# dowel: 15reps x2, 10reps x1  Straight leg raises: 10reps x2  Ab crunches: 15reps x3  Hip bridge (red stability ball under LLE): 15reps x3    Additional Treatment:  Pt. Needed assistance with BM hygiene.  Pt. Propelled self in w/c ~80ft from room to gym.    Patient left up in chair with PT    ASSESSMENT:  Janusz Montgomery Jr. is a 71 y.o. male with a medical diagnosis of Traumatic type III open fracture of shaft of left  tibia and fibula with nonunion and presents with decreased lower body functional mobility. Pt has increased therapeutic exercise weights/reps, and decreased assistance needed for self-care activities. Pt tolerates treatment with a positive attitude. Pt would continue to benefit from OT services to increase self-care abilities and independence in home.    Rehab identified problem list/impairments: Rehab identified problem list/impairments: weakness, impaired endurance, impaired self care skills, impaired functional mobilty, gait instability, impaired balance, decreased lower extremity function, edema, impaired skin, pain, orthopedic precautions    Rehab potential is good    Activity tolerance: Good    Discharge recommendations: home with home health     Barriers to discharge: Barriers to Discharge: Decreased caregiver support     Equipment recommendations: walker, rolling, wheelchair     GOALS:   Occupational Therapy Goals        Problem: Occupational Therapy Goal    Goal Priority Disciplines Outcome Interventions   Occupational Therapy Goal     OT, PT/OT Ongoing (interventions implemented as appropriate)    Description:  Goals to be met by: 14 days     Patient will increase functional independence with ADLs by performing:    LE Dressing with Set-up Assistance and Assistive Devices as needed.  Toileting from bedside commode with Minimal Assistance for hygiene and clothing management. --met  Bathing from  edge of bed with Minimal Assistance.  Stand pivot transfers with Modified [place].--met squat pivot--cont to address stand with RW  Toilet transfer to bedside commode with Modified [place].                 Plan:  Patient to be seen 5 x/week to address the above listed problems via self-care/home management, therapeutic activities, therapeutic exercises  Plan of Care reviewed with: patient    LORIE Doll   I certify that I was present in the room directing the student in service delivery and guiding them  using my skilled judgment. As the co-signing therapist I have reviewed the students documentation and am responsible for the treatment, assessment, and plan.       01/19/2017

## 2017-01-19 NOTE — TREATMENT PLAN
Rehab Services' DME recommendations    Janusz ALYSIA Montgomery Jr.  MRN: [id number]    [x]  No DME needed        [x] Home health PT, OT, Aide and Nurse        NIK Munson 1/19/2017

## 2017-01-19 NOTE — PLAN OF CARE
Problem: Physical Therapy Goal  Goal: Physical Therapy Goal  Goals to be met by: 2 weeks (tentative, pending medical progress)     Patient will increase functional independence with mobility by performin. Supine to sit with Modified [place] = met  2. Sit to supine with Modified [place]= met  3. Sit to stand transfer with Supervision and maintaining NWB LLE= met  4. Bed to chair transfer with Supervision using Rolling Walker and maintaining NWB LLE= met  5. Gait x 25 feet with Stand-by Assistance using Rolling Walker. and maintaining NWB LLE= met    6. Wheelchair propulsion x150 feet with Modified [place] using bilateral upper extremities= met    7. Lower extremity exercise program x20-30 reps per handout, with assistance as needed and gym therex= met   Goals remain appropriate. Continue with Physical therapy Plan of Care. Georgia Andrea, PT 2017

## 2017-01-19 NOTE — PROGRESS NOTES
Pt's wife called JOSEPH and stated that they have decided not to remain in the hospital until the antibiotics are completed but would like pt to discharge prior to that day which is 2/10/17.  RACHID called John to schedule a training for the pt's family prior to discharge.  Left message for Kandice.  RACHID will continue to monitor until discharge.

## 2017-01-19 NOTE — PROGRESS NOTES
Discharge Planning-Spoke with patient's spouse via phone regarding discharge date of 2/10/2017, copay amount, and days. Spouse stated they can not afford 19 days at $150. Informed spouse will have [facility] company contact her for training. RACHID tan.

## 2017-01-19 NOTE — PLAN OF CARE
Problem: Occupational Therapy Goal  Goal: Occupational Therapy Goal  Goals to be met by: 14 days     Patient will increase functional independence with ADLs by performing:    LE Dressing with Set-up Assistance and Assistive Devices as needed.  Toileting from bedside commode with Minimal Assistance for hygiene and clothing management. --met  Bathing from edge of bed with Minimal Assistance.  Stand pivot transfers with Modified [place].--met squat pivot--cont to address stand with RW  Toilet transfer to bedside commode with Modified [place].   Outcome: Ongoing (interventions implemented as appropriate)  Goals ongoing.  LORIE Doll  I certify that I was present in the room directing the student in service delivery and guiding them using my skilled judgment. As the co-signing therapist I have reviewed the students documentation and am responsible for the treatment, assessment, and plan.      1/19/2017

## 2017-01-19 NOTE — PT/OT/SLP PROGRESS
Physical Therapy  Treatment    Janusz Montgomery Jr.   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    Pain Ratin/10              Pain Rating Post-Intervention: 0/10    Objective:      Seated in w/c  Functional Mobility:  Bed Mobility :   Supine to sit: Modified Independent   Sit to supine: Modified Independent   Rolling: Activity did not occur   Scooting: Modified Independent    Functional Status:  Bed/Chair/WC: Modified [place]  Distance Walk: amb w/ Rw and close SBa w/ w/c follow 38 feet, 35 feet. seated rest break between trials. no LOB, NWB LLE  Walk: Exception/Household. Supervision.  Assistive Device: rolling walker  Gait Deviation(s): decreased sonali;decreased step length;decreased stride length;decreased toe-to-floor clearance  Wheelchair: Modified [place]  Distance Wheelchair: mod(I) on unit       Additional Treatment:  LBE x15 minutes w/ RLE    Patient left in w/c with self propelling on unit.    Assessment:  Janusz Montgomery Jr. is a 71 y.o. 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 Pt has progressed well. Overall SBA for gait w/ RW and mod(I) for trfs and bed mobility. Patient will benefit from continued physical therapy to address deficits and improve safety and functional mobility. Continue with physical therapy plan of care.   .    Rehab identified problem list/impairments: Rehab identified problem list/impairments: weakness,  impaired endurance, gait instability, impaired balance, decreased lower extremity function, orthopedic precautions    Rehab potential is good.    Activity tolerance: Good    Discharge recommendations: Discharge Facility/Level Of Care Needs: home with home health     Barriers to discharge: Barriers to Discharge: None    Equipment recommendations: Equipment Needed After Discharge: none (reports has needed DME)     GOALS:   Physical Therapy Goals        Problem: Physical Therapy Goal    Goal Priority Disciplines Outcome Goal Variances Interventions   Physical Therapy Goal     PT/OT, PT Ongoing (interventions implemented as appropriate)     Description:  Goals to be met by: 2 weeks (tentative, pending medical progress)     Patient will increase functional independence with mobility by performin. Supine to sit with Modified [place] =  met  2. Sit to supine with Modified [place]=  met  3. Sit to stand transfer with Supervision and maintaining NWB LLE= met  4. Bed to chair transfer with Supervision using Rolling Walker and maintaining NWB LLE= met  5. Gait  x 25 feet with Stand-by Assistance using Rolling Walker.  and maintaining NWB LLE=  met    6. Wheelchair propulsion x150 feet with Modified [place] using bilateral upper extremities=  met    7. Lower extremity exercise program x20-30 reps per handout, with assistance as needed and gym therex=  met                    PLAN:    Patient to be seen 5 x/week  to address the above listed problems via gait training, therapeutic activities, therapeutic exercises  Plan of Care expires:    Plan of Care reviewed with: patient    Georgia Andrea, PT  2017

## 2017-01-20 PROCEDURE — 25000003 PHARM REV CODE 250: Performed by: STUDENT IN AN ORGANIZED HEALTH CARE EDUCATION/TRAINING PROGRAM

## 2017-01-20 PROCEDURE — 25000003 PHARM REV CODE 250: Performed by: NURSE PRACTITIONER

## 2017-01-20 PROCEDURE — 25000003 PHARM REV CODE 250: Performed by: INTERNAL MEDICINE

## 2017-01-20 PROCEDURE — 97530 THERAPEUTIC ACTIVITIES: CPT

## 2017-01-20 PROCEDURE — 97116 GAIT TRAINING THERAPY: CPT

## 2017-01-20 PROCEDURE — 97110 THERAPEUTIC EXERCISES: CPT

## 2017-01-20 PROCEDURE — 11000004 HC SNF PRIVATE

## 2017-01-20 RX ADMIN — METOPROLOL TARTRATE 25 MG: 25 TABLET ORAL at 10:01

## 2017-01-20 RX ADMIN — STANDARDIZED SENNA CONCENTRATE AND DOCUSATE SODIUM 1 TABLET: 8.6; 5 TABLET, FILM COATED ORAL at 10:01

## 2017-01-20 RX ADMIN — GABAPENTIN 300 MG: 300 CAPSULE ORAL at 10:01

## 2017-01-20 RX ADMIN — ACETAMINOPHEN 650 MG: 325 TABLET ORAL at 10:01

## 2017-01-20 RX ADMIN — GABAPENTIN 300 MG: 300 CAPSULE ORAL at 01:01

## 2017-01-20 RX ADMIN — Medication 220 MG: at 08:01

## 2017-01-20 RX ADMIN — PANTOPRAZOLE SODIUM 40 MG: 40 TABLET, DELAYED RELEASE ORAL at 08:01

## 2017-01-20 RX ADMIN — Medication 10 ML: at 05:01

## 2017-01-20 RX ADMIN — OXYCODONE HYDROCHLORIDE AND ACETAMINOPHEN 1 TABLET: 10; 325 TABLET ORAL at 09:01

## 2017-01-20 RX ADMIN — LACTOBACILLUS ACIDOPHILUS / LACTOBACILLUS BULGARICUS 1 EACH: 100 MILLION CFU STRENGTH GRANULES at 10:01

## 2017-01-20 RX ADMIN — Medication 10 ML: at 12:01

## 2017-01-20 RX ADMIN — DICLOFENAC 2 G: 10 GEL TOPICAL at 08:01

## 2017-01-20 RX ADMIN — TORSEMIDE 40 MG: 20 TABLET ORAL at 08:01

## 2017-01-20 RX ADMIN — STANDARDIZED SENNA CONCENTRATE AND DOCUSATE SODIUM 1 TABLET: 8.6; 5 TABLET, FILM COATED ORAL at 08:01

## 2017-01-20 RX ADMIN — PIPERACILLIN SODIUM,TAZOBACTAM SODIUM 4.5 G: 4; .5 INJECTION, POWDER, FOR SOLUTION INTRAVENOUS at 04:01

## 2017-01-20 RX ADMIN — METOPROLOL TARTRATE 25 MG: 25 TABLET ORAL at 08:01

## 2017-01-20 RX ADMIN — VENLAFAXINE 150 MG: 37.5 TABLET ORAL at 08:01

## 2017-01-20 RX ADMIN — LACTOBACILLUS ACIDOPHILUS / LACTOBACILLUS BULGARICUS 1 EACH: 100 MILLION CFU STRENGTH GRANULES at 08:01

## 2017-01-20 RX ADMIN — ASPIRIN 325 MG ORAL TABLET 325 MG: 325 PILL ORAL at 10:01

## 2017-01-20 RX ADMIN — ASPIRIN 325 MG ORAL TABLET 325 MG: 325 PILL ORAL at 08:01

## 2017-01-20 RX ADMIN — GABAPENTIN 300 MG: 300 CAPSULE ORAL at 05:01

## 2017-01-20 RX ADMIN — COLLAGENASE SANTYL: 250 OINTMENT TOPICAL at 08:01

## 2017-01-20 RX ADMIN — TAMSULOSIN HYDROCHLORIDE 0.4 MG: 0.4 CAPSULE ORAL at 08:01

## 2017-01-20 RX ADMIN — ATORVASTATIN CALCIUM 40 MG: 20 TABLET, FILM COATED ORAL at 08:01

## 2017-01-20 RX ADMIN — VENLAFAXINE 150 MG: 37.5 TABLET ORAL at 10:01

## 2017-01-20 RX ADMIN — PIPERACILLIN SODIUM,TAZOBACTAM SODIUM 4.5 G: 4; .5 INJECTION, POWDER, FOR SOLUTION INTRAVENOUS at 08:01

## 2017-01-20 NOTE — PLAN OF CARE
Problem: Occupational Therapy Goal  Goal: Occupational Therapy Goal  Goals to be met by: 14 days     Patient will increase functional independence with ADLs by performing:    LE Dressing with Set-up Assistance and Assistive Devices as needed.  Toileting from bedside commode with Minimal Assistance for hygiene and clothing management. --met  Bathing from edge of bed with Minimal Assistance.  Stand pivot transfers with Modified [place].--met squat pivot--cont to address stand with RW  Toilet transfer to bedside commode with Modified [place].   Outcome: Ongoing (interventions implemented as appropriate)  Progressing toward goals. NIK Munson 1/20/2017

## 2017-01-20 NOTE — PT/OT/SLP PROGRESS
Physical Therapy  Treatment    Janusz Montgomery Jr.   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    Pain Ratin/10              Pain Rating Post-Intervention: 0/10    Objective:  Patient found with: PICC line     Functional Mobility:  Bed Mobility :   Supine to sit: Modified Independent   Sit to supine: Modified Independent    Functional Status:  Bed/Chair/WC: Modified [place]  Distance Walk:  (x2 trials (58 ft., and 40 ft.) w/c follow and seated rest break between trials. no LOB, NWB LLE)  Walk: Exception/Household. Supervision.  Assistive Device: rolling walker  Gait Deviation(s): decreased sonali;decreased step length;decreased stride length;decreased toe-to-floor clearance     PT Exercises: Ankle pumps 2x30  Glut sets 2x30  Straight leg raising 2x30  Hip flexion 2x30  Long arc quads 2x30    Additional Treatment:  LBE x15 min using LLE only to improve overall endurance.     Patient left up in chair with all lines intact and call button in reach.    Assessment:  Janusz Montgomery Jr. is a 71 y.o. 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 Pt tolerated treatment well, and continues to progress towards all goals. Pt would continue to benefit from PT intervention at this time. Continue with PT POC as indicated.    Rehab identified problem list/impairments: Rehab identified problem list/impairments: weakness, impaired endurance, gait instability,  impaired balance, decreased lower extremity function, orthopedic precautions    Rehab potential is good.    Activity tolerance: Good    Discharge recommendations: Discharge Facility/Level Of Care Needs: home with home health     Barriers to discharge: Barriers to Discharge: None    Equipment recommendations: Equipment Needed After Discharge: none     GOALS:   Physical Therapy Goals        Problem: Physical Therapy Goal    Goal Priority Disciplines Outcome Goal Variances Interventions   Physical Therapy Goal     PT/OT, PT Ongoing (interventions implemented as appropriate)     Description:  Goals to be met by: 2 weeks (tentative, pending medical progress)     Patient will increase functional independence with mobility by performin. Supine to sit with Modified [place] =  met  2. Sit to supine with Modified [place]=  met  3. Sit to stand transfer with Supervision and maintaining NWB LLE= met  4. Bed to chair transfer with Supervision using Rolling Walker and maintaining NWB LLE= met  5. Gait  x 25 feet with Stand-by Assistance using Rolling Walker.  and maintaining NWB LLE=  met    6. Wheelchair propulsion x150 feet with Modified [place] using bilateral upper extremities=  met    7. Lower extremity exercise program x20-30 reps per handout, with assistance as needed and gym therex=  met                    PLAN:    Patient to be seen 5 x/week  to address the above listed problems via gait training, therapeutic activities, therapeutic exercises  Plan of Care expires: 17  Plan of Care reviewed with: patient    Destinee Mar, PTA  2017

## 2017-01-20 NOTE — PLAN OF CARE
Problem: Physical Therapy Goal  Goal: Physical Therapy Goal  Goals to be met by: 2 weeks (tentative, pending medical progress)     Patient will increase functional independence with mobility by performin. Supine to sit with Modified [place] = met  2. Sit to supine with Modified [place]= met  3. Sit to stand transfer with Supervision and maintaining NWB LLE= met  4. Bed to chair transfer with Supervision using Rolling Walker and maintaining NWB LLE= met  5. Gait x 25 feet with Stand-by Assistance using Rolling Walker. and maintaining NWB LLE= met    6. Wheelchair propulsion x150 feet with Modified [place] using bilateral upper extremities= met    7. Lower extremity exercise program x20-30 reps per handout, with assistance as needed and gym therex= met   Goals remain appropriate at time. Continue with PT POC as indicated.

## 2017-01-20 NOTE — PT/OT/SLP PROGRESS
Occupational Therapy  Treatment    Janusz Montgomery Jr.   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    Pain Ratin/10                   Objective:  Patient found with: PICC line (supine in bed)    Functional Mobility:  Bed Mobility:   Supine to sit: Modified Independent   Sit to supine: Activity did not occur   Rolling: Modified Independent   Scooting: Modified Independent    Functional Status:  Dressing - Upper:  (setup button up to maribel,doff and maribel again due to hair washing)  Dressing - Lower: Modified [place] (shoe EOB)  Bed/Chair/WC: Supervision or Setup (sup stand pivot with RW from bed to w/c)      Balance:   Static Sit: Normal: Patient able to maintain steady balance without handhold support.  Dynamic Sit: Normal: Patient accepts maximal challenge and can shift weight easily within full range in all directions.  Static Stand: Fair: Patient able to maintain balance with handhold support; may require occasional minimal assistance.  Dynamic Stand: Fair: Patient accepts minimal challenge; able to maintain balance while turning head/trunk.    OT Exercises: Lat pull downs 50# 15 x 4-wide   Rowing 50# 15 x 4    Additional Treatment:  Bicep curls 15# 15 x 3  tricep press 50# 15 x 4  Close  lat pulls 15 x 4  Mod I with w/c mobility to and from gym  Pt stood at sink x 6 min to have hair washed NWB     Patient left up in chair     ASSESSMENT:  Pt tolerated tx and demonstrated increased indep with stand pivot t/f and standing task    Rehab identified  problem list/impairments: Rehab identified problem list/impairments: weakness, impaired endurance, impaired self care skills, impaired balance, gait instability, impaired functional mobilty, decreased coordination, edema, decreased ROM, orthopedic precautions, decreased lower extremity function, impaired skin    Rehab potential is good    Activity tolerance: Good    Discharge recommendations: home with home health     Barriers to discharge: Barriers to Discharge: None     Equipment recommendations: none     GOALS:   Occupational Therapy Goals        Problem: Occupational Therapy Goal    Goal Priority Disciplines Outcome Interventions   Occupational Therapy Goal     OT, PT/OT Ongoing (interventions implemented as appropriate)    Description:  Goals to be met by: 14 days     Patient will increase functional independence with ADLs by performing:    LE Dressing with Set-up Assistance and Assistive Devices as needed.  Toileting from bedside commode with Minimal Assistance for hygiene and clothing management. --met  Bathing from  edge of bed with Minimal Assistance.  Stand pivot transfers with Modified [place].--met squat pivot--cont to address stand with RW  Toilet transfer to bedside commode with Modified [place].                 Plan:  Patient to be seen 5 x/week to address the above listed problems via self-care/home management, therapeutic activities, therapeutic exercises  Plan of Care expires:    Plan of Care reviewed with: patient    Flavio NIK Roche  01/20/2017

## 2017-01-21 PROCEDURE — 11000004 HC SNF PRIVATE

## 2017-01-21 PROCEDURE — 25000003 PHARM REV CODE 250: Performed by: STUDENT IN AN ORGANIZED HEALTH CARE EDUCATION/TRAINING PROGRAM

## 2017-01-21 PROCEDURE — 25000003 PHARM REV CODE 250: Performed by: NURSE PRACTITIONER

## 2017-01-21 PROCEDURE — 25000003 PHARM REV CODE 250: Performed by: INTERNAL MEDICINE

## 2017-01-21 RX ADMIN — ACETAMINOPHEN 650 MG: 325 TABLET ORAL at 10:01

## 2017-01-21 RX ADMIN — TORSEMIDE 40 MG: 20 TABLET ORAL at 08:01

## 2017-01-21 RX ADMIN — TAMSULOSIN HYDROCHLORIDE 0.4 MG: 0.4 CAPSULE ORAL at 08:01

## 2017-01-21 RX ADMIN — PANTOPRAZOLE SODIUM 40 MG: 40 TABLET, DELAYED RELEASE ORAL at 08:01

## 2017-01-21 RX ADMIN — METOPROLOL TARTRATE 25 MG: 25 TABLET ORAL at 10:01

## 2017-01-21 RX ADMIN — VENLAFAXINE 150 MG: 37.5 TABLET ORAL at 08:01

## 2017-01-21 RX ADMIN — Medication 10 ML: at 12:01

## 2017-01-21 RX ADMIN — GABAPENTIN 300 MG: 300 CAPSULE ORAL at 02:01

## 2017-01-21 RX ADMIN — DICLOFENAC 2 G: 10 GEL TOPICAL at 09:01

## 2017-01-21 RX ADMIN — VENLAFAXINE 150 MG: 37.5 TABLET ORAL at 10:01

## 2017-01-21 RX ADMIN — Medication 10 ML: at 05:01

## 2017-01-21 RX ADMIN — ASPIRIN 325 MG ORAL TABLET 325 MG: 325 PILL ORAL at 10:01

## 2017-01-21 RX ADMIN — GABAPENTIN 300 MG: 300 CAPSULE ORAL at 10:01

## 2017-01-21 RX ADMIN — Medication 220 MG: at 08:01

## 2017-01-21 RX ADMIN — LACTOBACILLUS ACIDOPHILUS / LACTOBACILLUS BULGARICUS 1 EACH: 100 MILLION CFU STRENGTH GRANULES at 08:01

## 2017-01-21 RX ADMIN — ATORVASTATIN CALCIUM 40 MG: 20 TABLET, FILM COATED ORAL at 08:01

## 2017-01-21 RX ADMIN — ASPIRIN 325 MG ORAL TABLET 325 MG: 325 PILL ORAL at 08:01

## 2017-01-21 RX ADMIN — LACTOBACILLUS ACIDOPHILUS / LACTOBACILLUS BULGARICUS 1 EACH: 100 MILLION CFU STRENGTH GRANULES at 10:01

## 2017-01-21 RX ADMIN — RAMIPRIL 5 MG: 2.5 CAPSULE ORAL at 08:01

## 2017-01-21 RX ADMIN — PIPERACILLIN SODIUM,TAZOBACTAM SODIUM 4.5 G: 4; .5 INJECTION, POWDER, FOR SOLUTION INTRAVENOUS at 12:01

## 2017-01-21 RX ADMIN — METOPROLOL TARTRATE 25 MG: 25 TABLET ORAL at 08:01

## 2017-01-21 RX ADMIN — PIPERACILLIN SODIUM,TAZOBACTAM SODIUM 4.5 G: 4; .5 INJECTION, POWDER, FOR SOLUTION INTRAVENOUS at 08:01

## 2017-01-21 RX ADMIN — PIPERACILLIN SODIUM,TAZOBACTAM SODIUM 4.5 G: 4; .5 INJECTION, POWDER, FOR SOLUTION INTRAVENOUS at 05:01

## 2017-01-21 RX ADMIN — GABAPENTIN 300 MG: 300 CAPSULE ORAL at 05:01

## 2017-01-21 NOTE — PLAN OF CARE
Problem: Fall Risk (Adult)  Goal: Absence of Falls  Patient will demonstrate the desired outcomes by discharge/transition of care.   Outcome: Ongoing (interventions implemented as appropriate)  No falls or injuries this shift. Call light in reach. ndn.

## 2017-01-22 PROCEDURE — 25000003 PHARM REV CODE 250: Performed by: STUDENT IN AN ORGANIZED HEALTH CARE EDUCATION/TRAINING PROGRAM

## 2017-01-22 PROCEDURE — 11000004 HC SNF PRIVATE

## 2017-01-22 PROCEDURE — 25000003 PHARM REV CODE 250: Performed by: NURSE PRACTITIONER

## 2017-01-22 PROCEDURE — 63600175 PHARM REV CODE 636 W HCPCS: Performed by: NURSE PRACTITIONER

## 2017-01-22 RX ADMIN — SODIUM CHLORIDE, PRESERVATIVE FREE 10 ML: 5 INJECTION INTRAVENOUS at 11:01

## 2017-01-22 RX ADMIN — RAMIPRIL 5 MG: 2.5 CAPSULE ORAL at 09:01

## 2017-01-22 RX ADMIN — Medication 10 ML: at 06:01

## 2017-01-22 RX ADMIN — Medication 220 MG: at 09:01

## 2017-01-22 RX ADMIN — GABAPENTIN 300 MG: 300 CAPSULE ORAL at 06:01

## 2017-01-22 RX ADMIN — PIPERACILLIN SODIUM,TAZOBACTAM SODIUM 4.5 G: 4; .5 INJECTION, POWDER, FOR SOLUTION INTRAVENOUS at 09:01

## 2017-01-22 RX ADMIN — VENLAFAXINE 150 MG: 37.5 TABLET ORAL at 09:01

## 2017-01-22 RX ADMIN — TORSEMIDE 40 MG: 20 TABLET ORAL at 09:01

## 2017-01-22 RX ADMIN — OXYCODONE HYDROCHLORIDE AND ACETAMINOPHEN 1 TABLET: 10; 325 TABLET ORAL at 10:01

## 2017-01-22 RX ADMIN — LACTOBACILLUS ACIDOPHILUS / LACTOBACILLUS BULGARICUS 1 EACH: 100 MILLION CFU STRENGTH GRANULES at 09:01

## 2017-01-22 RX ADMIN — PIPERACILLIN SODIUM,TAZOBACTAM SODIUM 4.5 G: 4; .5 INJECTION, POWDER, FOR SOLUTION INTRAVENOUS at 06:01

## 2017-01-22 RX ADMIN — PIPERACILLIN SODIUM,TAZOBACTAM SODIUM 4.5 G: 4; .5 INJECTION, POWDER, FOR SOLUTION INTRAVENOUS at 02:01

## 2017-01-22 RX ADMIN — ATORVASTATIN CALCIUM 40 MG: 20 TABLET, FILM COATED ORAL at 09:01

## 2017-01-22 RX ADMIN — ASPIRIN 325 MG ORAL TABLET 325 MG: 325 PILL ORAL at 09:01

## 2017-01-22 RX ADMIN — METOPROLOL TARTRATE 25 MG: 25 TABLET ORAL at 09:01

## 2017-01-22 RX ADMIN — Medication 10 ML: at 12:01

## 2017-01-22 RX ADMIN — GABAPENTIN 300 MG: 300 CAPSULE ORAL at 01:01

## 2017-01-22 RX ADMIN — TAMSULOSIN HYDROCHLORIDE 0.4 MG: 0.4 CAPSULE ORAL at 09:01

## 2017-01-22 RX ADMIN — PANTOPRAZOLE SODIUM 40 MG: 40 TABLET, DELAYED RELEASE ORAL at 09:01

## 2017-01-22 RX ADMIN — GABAPENTIN 300 MG: 300 CAPSULE ORAL at 09:01

## 2017-01-22 RX ADMIN — DICLOFENAC 2 G: 10 GEL TOPICAL at 09:01

## 2017-01-22 NOTE — PLAN OF CARE
Problem: Patient Care Overview  Goal: Plan of Care Review  Outcome: Ongoing (interventions implemented as appropriate)  AFEBRILE.FALL PRECAUTIONS MAINTAINED NO INJURIES NOTED.AFEBRILE CONTINUES IV ANTIBIOTICS.NO PRESSURE ULCERS NOTED.

## 2017-01-22 NOTE — PLAN OF CARE
Problem: Patient Care Overview  Goal: Plan of Care Review  Outcome: Ongoing (interventions implemented as appropriate)    01/21/17 3505   Coping/Psychosocial   Plan Of Care Reviewed With patient         Comments:   Pt turns and repositions independently. No new skin breakdown noted. Pin site care done.  Pt pain and safety monitored q 1-2 hrs this shift. Heels elevated on pillows while in bed. Leg iced per order. Picc line dressing CDI. Bed locked and in lowest position. Rails elevated x 3. Brakes on. Call light and personal belongings in reach. Will continue to monitor.

## 2017-01-23 LAB
ALBUMIN SERPL BCP-MCNC: 3.2 G/DL
ALP SERPL-CCNC: 110 U/L
ALT SERPL W/O P-5'-P-CCNC: 19 U/L
ANION GAP SERPL CALC-SCNC: 10 MMOL/L
AST SERPL-CCNC: 17 U/L
BASOPHILS # BLD AUTO: 0.09 K/UL
BASOPHILS NFR BLD: 0.9 %
BILIRUB SERPL-MCNC: 0.6 MG/DL
BUN SERPL-MCNC: 25 MG/DL
CALCIUM SERPL-MCNC: 9.3 MG/DL
CHLORIDE SERPL-SCNC: 102 MMOL/L
CO2 SERPL-SCNC: 29 MMOL/L
CREAT SERPL-MCNC: 1.6 MG/DL
DIFFERENTIAL METHOD: ABNORMAL
DIGOXIN SERPL-MCNC: <0.1 NG/ML
EOSINOPHIL # BLD AUTO: 0.8 K/UL
EOSINOPHIL NFR BLD: 7.2 %
ERYTHROCYTE [DISTWIDTH] IN BLOOD BY AUTOMATED COUNT: 14.1 %
EST. GFR  (AFRICAN AMERICAN): 49.7 ML/MIN/1.73 M^2
EST. GFR  (NON AFRICAN AMERICAN): 43 ML/MIN/1.73 M^2
GLUCOSE SERPL-MCNC: 77 MG/DL
HCT VFR BLD AUTO: 33.6 %
HGB BLD-MCNC: 10.6 G/DL
LYMPHOCYTES # BLD AUTO: 3.3 K/UL
LYMPHOCYTES NFR BLD: 31.7 %
MCH RBC QN AUTO: 31.7 PG
MCHC RBC AUTO-ENTMCNC: 31.5 %
MCV RBC AUTO: 101 FL
MONOCYTES # BLD AUTO: 0.8 K/UL
MONOCYTES NFR BLD: 7.2 %
NEUTROPHILS # BLD AUTO: 5.6 K/UL
NEUTROPHILS NFR BLD: 53 %
PLATELET # BLD AUTO: 210 K/UL
PMV BLD AUTO: 11.3 FL
POTASSIUM SERPL-SCNC: 3.9 MMOL/L
PROT SERPL-MCNC: 7 G/DL
RBC # BLD AUTO: 3.34 M/UL
SODIUM SERPL-SCNC: 141 MMOL/L
WBC # BLD AUTO: 10.55 K/UL

## 2017-01-23 PROCEDURE — 99316 NF DSCHRG MGMT 30 MIN+: CPT | Mod: ,,, | Performed by: HOSPITALIST

## 2017-01-23 PROCEDURE — 25000003 PHARM REV CODE 250: Performed by: NURSE PRACTITIONER

## 2017-01-23 PROCEDURE — 97535 SELF CARE MNGMENT TRAINING: CPT

## 2017-01-23 PROCEDURE — 80053 COMPREHEN METABOLIC PANEL: CPT

## 2017-01-23 PROCEDURE — 80162 ASSAY OF DIGOXIN TOTAL: CPT

## 2017-01-23 PROCEDURE — 25000003 PHARM REV CODE 250: Performed by: PHYSICIAN ASSISTANT

## 2017-01-23 PROCEDURE — 36415 COLL VENOUS BLD VENIPUNCTURE: CPT

## 2017-01-23 PROCEDURE — 97530 THERAPEUTIC ACTIVITIES: CPT

## 2017-01-23 PROCEDURE — 97116 GAIT TRAINING THERAPY: CPT

## 2017-01-23 PROCEDURE — 97803 MED NUTRITION INDIV SUBSEQ: CPT

## 2017-01-23 PROCEDURE — 63600175 PHARM REV CODE 636 W HCPCS: Performed by: NURSE PRACTITIONER

## 2017-01-23 PROCEDURE — 11000004 HC SNF PRIVATE

## 2017-01-23 PROCEDURE — 25000003 PHARM REV CODE 250: Performed by: STUDENT IN AN ORGANIZED HEALTH CARE EDUCATION/TRAINING PROGRAM

## 2017-01-23 PROCEDURE — 97110 THERAPEUTIC EXERCISES: CPT

## 2017-01-23 PROCEDURE — 85025 COMPLETE CBC W/AUTO DIFF WBC: CPT

## 2017-01-23 RX ORDER — L. ACIDOPHILUS/L.BULGARICUS 100MM CELL
1 GRANULES IN PACKET (EA) ORAL 2 TIMES DAILY
Status: ON HOLD | COMMUNITY
Start: 2017-01-23 | End: 2017-03-03 | Stop reason: HOSPADM

## 2017-01-23 RX ORDER — ZINC SULFATE 50(220)MG
220 CAPSULE ORAL DAILY
COMMUNITY
Start: 2017-01-23 | End: 2019-05-08

## 2017-01-23 RX ORDER — TORSEMIDE 20 MG/1
40 TABLET ORAL DAILY
Qty: 180 TABLET | Refills: 0 | Status: ON HOLD
Start: 2017-01-23 | End: 2017-03-20 | Stop reason: HOSPADM

## 2017-01-23 RX ORDER — ACYCLOVIR 800 MG/1
800 TABLET ORAL DAILY
Qty: 30 TABLET | Refills: 11 | Status: ON HOLD
Start: 2017-01-23 | End: 2017-03-03 | Stop reason: HOSPADM

## 2017-01-23 RX ORDER — ASPIRIN 325 MG
325 TABLET ORAL 2 TIMES DAILY
Qty: 60 TABLET | Refills: 0 | Status: ON HOLD | OUTPATIENT
Start: 2017-01-23 | End: 2017-03-15

## 2017-01-23 RX ORDER — OXYCODONE AND ACETAMINOPHEN 10; 325 MG/1; MG/1
1 TABLET ORAL EVERY 4 HOURS PRN
Qty: 35 TABLET | Refills: 0 | Status: SHIPPED | OUTPATIENT
Start: 2017-01-23 | End: 2017-02-14 | Stop reason: ALTCHOICE

## 2017-01-23 RX ORDER — LOPERAMIDE HYDROCHLORIDE 2 MG/1
2 CAPSULE ORAL 4 TIMES DAILY PRN
Qty: 30 CAPSULE | Refills: 0 | Status: SHIPPED | OUTPATIENT
Start: 2017-01-23 | End: 2017-01-26

## 2017-01-23 RX ORDER — DICLOFENAC SODIUM 10 MG/G
2 GEL TOPICAL DAILY
Qty: 1 TUBE | Refills: 0 | Status: SHIPPED | OUTPATIENT
Start: 2017-01-23 | End: 2017-07-06

## 2017-01-23 RX ORDER — TERIPARATIDE 250 UG/ML
20 INJECTION, SOLUTION SUBCUTANEOUS DAILY
Qty: 2.4 ML | Refills: 11 | Status: SHIPPED | OUTPATIENT
Start: 2017-01-23 | End: 2017-01-26

## 2017-01-23 RX ORDER — POLYETHYLENE GLYCOL 3350 17 G/17G
17 POWDER, FOR SOLUTION ORAL DAILY
Qty: 30 PACKET | Refills: 0 | Status: ON HOLD | OUTPATIENT
Start: 2017-01-23 | End: 2017-03-15

## 2017-01-23 RX ORDER — AMOXICILLIN 250 MG
1 CAPSULE ORAL 2 TIMES DAILY
Status: ON HOLD | COMMUNITY
Start: 2017-01-23 | End: 2017-03-15

## 2017-01-23 RX ADMIN — RAMIPRIL 5 MG: 2.5 CAPSULE ORAL at 08:01

## 2017-01-23 RX ADMIN — METOPROLOL TARTRATE 25 MG: 25 TABLET ORAL at 09:01

## 2017-01-23 RX ADMIN — Medication 10 ML: at 05:01

## 2017-01-23 RX ADMIN — ATORVASTATIN CALCIUM 40 MG: 20 TABLET, FILM COATED ORAL at 08:01

## 2017-01-23 RX ADMIN — PIPERACILLIN SODIUM,TAZOBACTAM SODIUM 4.5 G: 4; .5 INJECTION, POWDER, FOR SOLUTION INTRAVENOUS at 02:01

## 2017-01-23 RX ADMIN — ASPIRIN 325 MG ORAL TABLET 325 MG: 325 PILL ORAL at 09:01

## 2017-01-23 RX ADMIN — VENLAFAXINE 150 MG: 37.5 TABLET ORAL at 11:01

## 2017-01-23 RX ADMIN — GABAPENTIN 300 MG: 300 CAPSULE ORAL at 09:01

## 2017-01-23 RX ADMIN — GABAPENTIN 300 MG: 300 CAPSULE ORAL at 01:01

## 2017-01-23 RX ADMIN — Medication 10 ML: at 11:01

## 2017-01-23 RX ADMIN — ASPIRIN 325 MG ORAL TABLET 325 MG: 325 PILL ORAL at 08:01

## 2017-01-23 RX ADMIN — Medication 10 ML: at 07:01

## 2017-01-23 RX ADMIN — PIPERACILLIN SODIUM,TAZOBACTAM SODIUM 4.5 G: 4; .5 INJECTION, POWDER, FOR SOLUTION INTRAVENOUS at 09:01

## 2017-01-23 RX ADMIN — TORSEMIDE 40 MG: 20 TABLET ORAL at 08:01

## 2017-01-23 RX ADMIN — Medication 220 MG: at 08:01

## 2017-01-23 RX ADMIN — TAMSULOSIN HYDROCHLORIDE 0.4 MG: 0.4 CAPSULE ORAL at 08:01

## 2017-01-23 RX ADMIN — LACTOBACILLUS ACIDOPHILUS / LACTOBACILLUS BULGARICUS 1 EACH: 100 MILLION CFU STRENGTH GRANULES at 08:01

## 2017-01-23 RX ADMIN — DICLOFENAC 2 G: 10 GEL TOPICAL at 08:01

## 2017-01-23 RX ADMIN — LACTOBACILLUS ACIDOPHILUS / LACTOBACILLUS BULGARICUS 1 EACH: 100 MILLION CFU STRENGTH GRANULES at 09:01

## 2017-01-23 RX ADMIN — LOPERAMIDE HYDROCHLORIDE 2 MG: 2 CAPSULE ORAL at 09:01

## 2017-01-23 RX ADMIN — PIPERACILLIN SODIUM,TAZOBACTAM SODIUM 4.5 G: 4; .5 INJECTION, POWDER, FOR SOLUTION INTRAVENOUS at 05:01

## 2017-01-23 RX ADMIN — METOPROLOL TARTRATE 25 MG: 25 TABLET ORAL at 08:01

## 2017-01-23 RX ADMIN — VENLAFAXINE 150 MG: 37.5 TABLET ORAL at 08:01

## 2017-01-23 RX ADMIN — GABAPENTIN 300 MG: 300 CAPSULE ORAL at 05:01

## 2017-01-23 RX ADMIN — PANTOPRAZOLE SODIUM 40 MG: 40 TABLET, DELAYED RELEASE ORAL at 08:01

## 2017-01-23 NOTE — PLAN OF CARE
Problem: Patient Care Overview  Goal: Plan of Care Review  Outcome: Ongoing (interventions implemented as appropriate)    01/23/17 0304   Coping/Psychosocial   Plan Of Care Reviewed With patient         Problem: Infection, Risk/Actual (Adult)  Goal: Identify Related Risk Factors and Signs and Symptoms  Related risk factors and signs and symptoms are identified upon initiation of Human Response Clinical Practice Guideline (CPG)   Outcome: Ongoing (interventions implemented as appropriate)    01/23/17 0304   Infection, Risk/Actual   Related Risk Factors (Infection, Risk/Actual) exposure to microbes;prolonged hospitalization;skin integrity impairment;trauma injury   Signs and Symptoms (Infection, Risk/Actual) edema;pain

## 2017-01-23 NOTE — PT/OT/SLP PROGRESS
Physical Therapy  Treatment/discharge summary    Janusz Montgomery Jr.   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  Pt was agreeable to PT services.    Pain Ratin/10    Objective:    Functional Mobility:  Bed Mobility :   Supine to sit: Modified Independent   Sit to supine: Modified Independent       Functional Status:  Bed/Chair/WC: Supervision or Setup (SBA for squat pivots. Pt was cautioned to take his time in doing this, as he displayed poor technique during one of his WC>mat transfers.)  Toilet:  (n/a)  Distance Walk: 70 feet with CGA.  Assistive Device: rolling walker  Wheelchair: Modified [place]  Distance Wheelchair: 300 feet     Gait Pattern: swing-to gait  Gait Deviation(s): decreased sonali    PT Exercises (focusing on core):  1 Abdominal isometrics x 20 (2trials)  2 SLR- 2x20 reps  3 Leg lifts- 2x10 reps (to work lower abdominal region)  4 Gluteal sets- 2x20 reps  5 Abdominal crunches in supine- 2x10 reps    Addition treatment:  Pt was given a HEP (with the above listed exercises) and educated on how to remain safe upon return home with ex-fix still in place.    Patient left up in chair with call button in reach.    Assessment:  Janusz Montgomery Jr. is a 72 y.o. 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  He will benefit from further PT services to  continue to improve his strength.    Rehab identified problem list/impairments: Rehab identified problem list/impairments: weakness, impaired endurance, impaired self care skills, impaired functional mobilty, impaired balance, gait instability, decreased lower extremity function, orthopedic precautions, impaired skin, edema    Rehab potential is good.    Activity tolerance: Good    Discharge recommendations: Discharge Facility/Level Of Care Needs: home health PT     Barriers to discharge: Barriers to Discharge: None    Equipment recommendations: Equipment Needed After Discharge: none     GOALS:   Physical Therapy Goals     Not on file      Multidisciplinary Problems (Resolved)        Problem: Physical Therapy Goal    Goal Priority Disciplines Outcome Goal Variances Interventions   Physical Therapy Goal   (Resolved)     PT/OT, PT Outcome(s) achieved     Description:  Goals to be met by: 2 weeks (tentative, pending medical progress)     Patient will increase functional independence with mobility by performin. Supine to sit with Modified [place] =  met  2. Sit to supine with Modified [place]=  met  3. Sit to stand transfer with Supervision and maintaining NWB LLE= met  4. Bed to chair transfer with Supervision using Rolling Walker and maintaining NWB LLE= met  5. Gait  x 25 feet with Stand-by Assistance using Rolling Walker.  and maintaining NWB LLE=  met    6. Wheelchair propulsion x150 feet with Modified [place] using bilateral upper extremities=  met    7. Lower extremity exercise program x20-30 reps per handout, with assistance as needed and gym therex=  met                    PLAN:    Patient to be seen 5 x/week  to address the above listed problems via gait training, therapeutic activities, therapeutic exercises  Plan of Care expires: 17  Plan of Care reviewed with: patient    Ariadna Cash, PT  2017

## 2017-01-23 NOTE — NURSING
Eugene ( n/p) was informed that pt refuse lopressor after nurse obtained  B/p reading of 108/53 and manual retake of 102/50. No new orders given. Will monitor

## 2017-01-23 NOTE — PT/OT/SLP PROGRESS
Occupational Therapy  Treatment /  Discharge    Janusz Montgomery Jr.   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 Pt asked to pause IV drip to change shirts, nurse notified.    Pain Ratin/10       Objective:  Patient found with: PICC line, peripheral IV (EOB eating breakfast)    Functional Mobility:  Bed Mobility:   Scooting: Modified Independent    Functional Status:  Eating: Complete [place]  Grooming: Complete [place] (pt brushed hair, teeth EOB)  Bathing: Supervision or Setup (set-up EOB bathing )  Dressing - Upper: Complete [place]  Dressing - Lower: Modified [place] (pt donned lower body dressing EOB, sit EOB <> stand w/ RW; pt used reacher to retrieve shoe)  Bed/Chair/WC: Modified [place] (sit EOB <> stand w/ RW)      Balance:   Static Sit: Normal: Patient able to maintain steady balance without handhold support.  Dynamic Sit: Normal: Patient accepts maximal challenge and can shift weight easily within full range in all directions.  Static Stand: Good: Patient able to maintain balance without handhold support, limited postural sway  Dynamic Stand: Good: Patient accepts moderate challenge; able to maintain balance while picking object off floor.    Additional Treatment:  Pt educated on AD use for lower body dressing/clothing retrieval.    Patient left seated EOB with call button in reach    ASSESSMENT:  Janusz Montgomery Jr. is a 72 y.o. male with a medical diagnosis of Traumatic type III open fracture of shaft of left tibia  and fibula with nonunion and presents with decreased functional mobility due to non-weight bearing of LLE. Pt has increased ability to perform therapeutic exercise and self-care tasks while maintaining orthopaedic precautions. Pt tolerates therapy well, and would continue to benefit from home health OT services to address self-care and independent living skills, however pt's wife Independent in assistance at home.    Rehab identified problem list/impairments: Rehab identified problem list/impairments: weakness, impaired endurance, impaired self care skills, decreased ROM, edema, orthopedic precautions, impaired skin, gait instability, impaired functional mobilty, decreased lower extremity function, impaired balance, impaired coordination    Rehab potential is good    Activity tolerance: Good    Discharge recommendations: home with home health     Barriers to discharge: Barriers to Discharge: None     Equipment recommendations: none     GOALS:   Occupational Therapy Goals     Not on file      Multidisciplinary Problems (Resolved)        Problem: Occupational Therapy Goal    Goal Priority Disciplines Outcome Interventions   Occupational Therapy Goal   (Resolved)     OT, PT/OT Outcome(s) achieved    Description:  Goals to be met by: 14 days     Patient will increase functional independence with ADLs by performing:    LE Dressing with Set-up Assistance and Assistive Devices as needed. --met  Toileting from bedside commode with Minimal Assistance for hygiene and clothing management. --met  Bathing from  edge of bed with Minimal Assistance. --met  Stand pivot transfers with Modified [place].--met squat pivot--cont to address stand with RW  Toilet transfer to bedside commode with Modified [place]. --not witnessed, pt transfers on/off standard toilet w/ grab bar Mod [place]                    Plan:  Patient discharged from  SNF OT 1/23/17.      LORIE Doll   I certify that I was present in the room  directing the student in service delivery and guiding them using my skilled judgment. As the co-signing therapist I have reviewed the students documentation and am responsible for the treatment, assessment, and plan.       01/23/2017

## 2017-01-23 NOTE — PLAN OF CARE
Problem: Physical Therapy Goal  Goal: Physical Therapy Goal  Goals to be met by: 2 weeks (tentative, pending medical progress)     Patient will increase functional independence with mobility by performin. Supine to sit with Modified [place] = met  2. Sit to supine with Modified [place]= met  3. Sit to stand transfer with Supervision and maintaining NWB LLE= met  4. Bed to chair transfer with Supervision using Rolling Walker and maintaining NWB LLE= met  5. Gait x 25 feet with Stand-by Assistance using Rolling Walker. and maintaining NWB LLE= met    6. Wheelchair propulsion x150 feet with Modified [place] using bilateral upper extremities= met    7. Lower extremity exercise program x20-30 reps per handout, with assistance as needed and gym therex= met   Outcome: Outcome(s) achieved Date Met:  17  Pt met all of his goals.

## 2017-01-23 NOTE — PLAN OF CARE
[facility] HOME HEALTH ORDERS  FACE TO FACE ENCOUNTER    Patient Name: Janusz Montgomery Jr.  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 [S82.202N, S82.402N]  Yes    Status post surgery [Z98.890]  Not Applicable    Decubitus ulcer of left heel, stage 4 [L89.624]  Yes    Major depressive disorder, recurrent episode, mild [F33.0]  Yes    Paroxysmal atrial fibrillation - new onset after trauma [I48.0]  Yes    BPH with urinary obstruction [N40.1, N13.8]  Yes    GERD (gastroesophageal reflux disease) [K21.9]  Yes    Hyperlipidemia [E78.5]  Yes    Essential hypertension [I10]  Yes    Coronary artery disease involving native coronary artery of native heart without angina pectoris s/p RCA stent [I25.10]  Yes    Obstructive sleep apnea on CPAP [G47.33]  Yes      Resolved Hospital Problems    Diagnosis Date Resolved POA   No resolved problems to display.       Future Appointments  Date Time Provider Department Center   1/31/2017 9:00 AM Walter Cortés MD [facility]   2/7/2017 1:00 PM Benny Cannon PA-C [facility]     Follow-up Information     Follow up with Walter Cortés MD. Go on 1/31/2017.    Specialty:  Orthopedic Surgery    Why:  For wound re-check    Contact information:    [address] [facility] [id number] [phone]          Follow up with Benny Cannon PA-C. Go on 2/7/2017.    Specialty:  Infectious Diseases    Why:  Follow up    Contact information:    [address] [facility] [address] [phone]          Follow up with Miguelina Weathers MD. Schedule an appointment as soon as possible for a visit in 2 weeks.    Specialties:   Internal Medicine, Pediatrics    Why:  Hospital follow up    Contact information:    [address] [place] [address]  [phone]              I have seen and examined this patient face to face today. My clinical findings that support the need for the home health skilled services and home bound status are the following:  Weakness/numbness causing balance and gait disturbance due to Infection, Weakness/Debility and Surgery making it taxing to leave home.    Allergies:  Review of patient's allergies indicates:   Allergen Reactions    Bacitracin Hives and Itching       Diet: regular diet    Activities: Non weight bearing to LLE    Nursing:   SN to complete comprehensive assessment including routine vital signs. Instruct on disease process and s/s of complications to report to MD. Review/verify medication list sent home with the patient at time of discharge  and instruct patient/caregiver as needed. Frequency may be adjusted depending on start of care date.    Notify MD if SBP > 160 or < 90; DBP > 90 or < 50; HR > 120 or < 50; Temp > 101;     Weekly ESR, CRP, CBC, CMP starting 1/25/17  Send results to JOVI Blackwell with infectious disease.  Fax to [phone]    CONSULTS:    Physical Therapy to evaluate and treat. Evaluate for home safety and equipment needs; Establish/upgrade home exercise program. Perform / instruct on therapeutic exercises, gait training, transfer training, and Range of Motion.  Occupational Therapy to evaluate and treat. Evaluate home environment for safety and equipment needs. Perform/Instruct on transfers, ADL training, ROM, and therapeutic exercises.   to evaluate for community resources/long-range planning.  Aide to provide assistance with personal care, ADLs, and vital signs.    MISCELLANEOUS CARE:  Home Infusion Therapy:   SN to perform Infusion Therapy/Central Line Care.  Review Central Line Care & Central Line Flush with patient.    Administer (drug and dose): Zosyn 4.5 gm  tid    Last dose given: 1/24/17 0600                         Home dose due: 1/24/17 1400    Scrub the Hub: Prior to accessing the line, always perform a 30 second alcohol scrub  Each lumen of the central line is to be flushed at least daily with 10 mL Normal Saline and 3 mL Heparin flush (100 units/mL)  Skilled Nurse (SN) may draw blood from IV access  Blood Draw Procedure:   - Aspirate at least 5 mL of blood   - Discard   - Obtain specimen   - Change posiflow cap   - Flush with 20 mL Normal Saline followed by a                 3-5 mL Heparin flush (100 units/mL)  Central :   - Sterile dressing changes are done weekly and as needed.   - Use chlor-hexadine scrub to cleanse site, apply Biopatch to insertion site,       apply securement device dressing   - Posi-flow caps are changed weekly and after EVERY lab draw.   - If sterile gauze is under dressing to control oozing,                 dressing change must be performed every 24 hours until gauze is not needed.    WOUND CARE ORDERS  Once daily pin site care:  Clean pins with 50% peroxide / 50% sterile water solution.  Wrap pins with Kerlex gauze.     Nursing to apply Santyl (nickel thick) to the slough area and place Fabienne to pink wound bed then  cover with damp gauze (to activate santyl) and cover with telfa island dressing (mepore) over pressure injury every Mon-Wed-Fri. Santyl has enzymatic debridement properties and will promote healing.  Fabienne has wound healing properties to 'jump-start' the granulation process.      Medications: Review discharge medications with patient and family and provide education.      Current Discharge Medication List      START taking these medications    Details   artificial tears (ISOPTO TEARS) 0.5 % ophthalmic solution Place 1 drop into both eyes as needed.      collagenase ointment Apply topically every Mon, Wed, Fri. Nursing to apply Santyl  nickel thick to wound bed and edges and cover with damp gauze (to activate  santyl) daily and cover with telfa island dressing (mepore) over each pressure injury. Santyl has autolytic debridement properties and will promote healing.  Qty: 60 g, Refills: 0      diclofenac sodium 1 % Gel Apply 2 g topically once daily. Apply 2 g to left shoulder  Qty: 1 Tube, Refills: 0      lactobacillus acidophilus & bulgar (LACTINEX) 100 million cell packet Take 1 packet (1 each total) by mouth 2 (two) times daily.      loperamide (IMODIUM) 2 mg capsule Take 1 capsule (2 mg total) by mouth 4 (four) times daily as needed for Diarrhea.  Qty: 30 capsule, Refills: 0      PIPERACILLIN SODIUM/TAZOBACTAM (PIPERACILLIN-TAZOBACTAM 4.5G/100ML D5W IVPB, READY TO MIX,) Inject 100 mLs (4.5 g total) into the vein every 8 (eight) hours.  Qty: 5100 mL, Refills: 0      polyethylene glycol (GLYCOLAX) 17 gram PwPk Take 17 g by mouth once daily.  Qty: 30 packet, Refills: 0      senna-docusate 8.6-50 mg (PERICOLACE) 8.6-50 mg per tablet Take 1 tablet by mouth 2 (two) times daily.      teriparatide (FORTEO) 20 mcg/dose - 600 mcg/2.4 mL PnIj Inject 0.08 mLs (20 mcg total) into the skin once daily. HOLD UNTIL SEEN BY YOUR PCP  Qty: 2.4 mL, Refills: 11      zinc sulfate (ZINCATE) 220 (50) mg capsule Take 1 capsule (220 mg total) by mouth once daily.         CONTINUE these medications which have CHANGED    Details   acyclovir (ZOVIRAX) 800 MG Tab Take 1 tablet (800 mg total) by mouth once daily. HOLD UNTIL SEEN BY YOUR PCP  Qty: 30 tablet, Refills: 11      aspirin 325 MG tablet Take 1 tablet (325 mg total) by mouth 2 (two) times daily.  Qty: 60 tablet, Refills: 0      oxycodone-acetaminophen (PERCOCET)  mg per tablet Take 1 tablet by mouth every 4 (four) hours as needed (pain).  Qty: 35 tablet, Refills: 0      torsemide (DEMADEX) 20 MG Tab Take 2 tablets (40 mg total) by mouth once daily. 1 tablet (20 mg) daily for 3 days.  Qty: 180 tablet, Refills: 0         CONTINUE these medications which have NOT CHANGED    Details    atorvastatin (LIPITOR) 40 MG tablet TAKE ONE TABLET BY MOUTH ONCE DAILY  Qty: 90 tablet, Refills: 0      gabapentin (NEURONTIN) 300 MG capsule Take 1 capsule (300 mg total) by mouth 3 (three) times daily.  Qty: 90 capsule, Refills: 11      omeprazole (PRILOSEC) 20 MG capsule Take 2 capsules (40 mg total) by mouth once daily.  Qty: 180 capsule, Refills: 0      promethazine (PHENERGAN) 12.5 MG Tab Take 1 tablet (12.5 mg total) by mouth every 6 (six) hours as needed.  Qty: 30 tablet, Refills: 0      ramipril (ALTACE) 5 MG capsule TAKE ONE CAPSULE BY MOUTH ONCE DAILY  Qty: 90 capsule, Refills: 0      tamsulosin (FLOMAX) 0.4 mg Cp24 Take 1 capsule (0.4 mg total) by mouth once daily.  Qty: 90 capsule, Refills: 3    Associated Diagnoses: Urinary frequency      venlafaxine (EFFEXOR) 75 MG tablet Take 2 tablets (150 mg total) by mouth 2 (two) times daily.  Qty: 360 tablet, Refills: 0    Associated Diagnoses: Mixed anxiety and depressive disorder      CYANOCOBALAMIN, VITAMIN B-12, (VITAMIN B-12 ORAL) Take 2,500 mcg by mouth once daily.      melatonin 3 mg Tab Take 6 mg by mouth nightly as needed.      metoprolol tartrate (LOPRESSOR) 25 MG tablet Take 1 tablet (25 mg total) by mouth 2 (two) times daily.  Qty: 180 tablet, Refills: 3    Associated Diagnoses: Essential hypertension      nitroGLYCERIN (NITROSTAT) 0.4 MG SL tablet Place 1 tablet (0.4 mg total) under the tongue every 5 (five) minutes as needed for Chest pain.  Qty: 100 tablet, Refills: 0    Associated Diagnoses: Coronary artery disease      vitamin D 1000 units Tab Take 1 tablet (1,000 Units total) by mouth once daily.         STOP taking these medications       docusate sodium (COLACE) 100 MG capsule Comments:   Reason for Stopping:         oxycodone (ROXICODONE) 5 MG immediate release tablet Comments:   Reason for Stopping:               I certify that this patient is confined to his home and needs intermittent skilled nursing care, physical therapy and  occupational therapy.

## 2017-01-23 NOTE — PLAN OF CARE
Problem: Occupational Therapy Goal  Goal: Occupational Therapy Goal  Goals to be met by: 14 days     Patient will increase functional independence with ADLs by performing:    LE Dressing with Set-up Assistance and Assistive Devices as needed. --met  Toileting from bedside commode with Minimal Assistance for hygiene and clothing management. --met  Bathing from edge of bed with Minimal Assistance. --met  Stand pivot transfers with Modified [place].--met squat pivot--cont to address stand with RW  Toilet transfer to bedside commode with Modified [place]. --not witnessed, pt transfers on/off standard toilet w/ grab bar Mod [place]     Outcome: Outcome(s) achieved Date Met:  01/23/17  Goals met. Pt to be discharged 1/23/17     LORIE Doll  I certify that I was present in the room directing the student in service delivery and guiding them using my skilled judgment. As the co-signing therapist I have reviewed the students documentation and am responsible for the treatment, assessment, and plan.      1/23/2017

## 2017-01-23 NOTE — PROGRESS NOTES
[facility]  Adult Nutrition  Consult Note    SUMMARY     Recommendations    Recommendation/Intervention: 1. Rec add arginaid bid 2. Rec add MVI daily 3. RD to monitor  Goals: Pt to meet >85% EEN  Nutrition Goal Status: goal met  Communication of RD Recs: reviewed with RN    Continuum of Care Plan    Referral to Outpatient Services:  (D/C planning: regular diet with supplements as needed)    Reason for Assessment    Reason for Assessment: length of stay  Diagnosis: other (see comments) (s/p external fixation of left tibial fracture & I&D L)heel )  Relevent Medical History: Stage IV heel ulcer, HLD, GOUT, H.Pylori, Ca, HTN, CAD   Interdisciplinary Rounds: attended     General Information Comments: Pt w/ no c/o n/v/c/d; saúl'ing meals well w/ good appetite    Nutrition Prescription Ordered    Current Diet Order: 2gm Na     Nutrition Risk Screen     Nutrition Risk Screen: no indicators present    Nutrition/Diet History       Typical Food/Fluid Intake: adequate  Food Preferences: Denies cultural, Jehovah's witness, ethnic food preferences        Factors Affecting Nutritional Intake: other (see comments) (none)                Labs/Tests/Procedures/Meds       Pertinent Labs Reviewed: reviewed  Pertinent Labs Comments: Alb 3.2, CRP 12.4, BUN 25, Crt 1.6  Pertinent Medications Reviewed: reviewed  Pertinent Medications Comments: statin, lactobacillus, pantoprazole, abx, senna-docusate, torsemide, zinc    Physical Findings    Overall Physical Appearance: obese     Oral/Mouth Cavity: WDL  Skin:  (Stage IV PU on heel-healing)    Anthropometrics       Height (inches): 72.99 in  Weight Method: Standard Scale  Weight (kg): (!) 145.6 kg  Ideal Body Weight (IBW), Male: 183.94 lb     % Ideal Body Weight, Male (lb): 174.51 lb     BMI (kg/m2): 42.36  BMI Grade: greater than 40 - morbid obesity        % Weight Change:  (2/2 fluid; - 37.6L since admit)        Estimated/Assessed Needs    Weight Used For Calorie Calculations: (!)  145.6 kg (320 lb 15.8 oz)   Height (cm): 185.4 cm     Energy Need Method: [place]-St Jeor (= 2274 cals daily (no AF used 2/2 high BMI))     RMR ([place]-St. Jeor Equation): 2273.89        Weight Used For Protein Calculations: (!) 145.6 kg (320 lb 15.8 oz)  Protein Requirements: 117 gms (.8 gms/kg)    Fluid Need Method: RDA Method        Malnutrition (Undernutrition) Diagnosis    % Intake of Estimated Energy Needs: 75 - 100%  % Meal Intake: 100%     Nutrition Diagnosis    Nutrition Problem: Increased nutrient needs  Etiology/Related To: wound/fx  Nutrition Diagnosis Signs/Symptoms As Evidenced By: Stage IV wound/healing fx  Nutrition Diagnosis Status: Continues    Monitor and Evaluation    Food and Nutrient Intake: energy intake  Food and Nutrient Adminstration: diet order        Anthropometric Measurements: weight, weight change  Biochemical Data, Medical Tests and Procedures: electrolyte and renal panel, glucose/endocrine profile  Nutrition-Focused Physical Findings: overall appearance, skin    Nutrition Risk    Level of Risk:  (1 x week)    Nutrition Follow-Up    RD Follow-up?: Yes

## 2017-01-24 VITALS
OXYGEN SATURATION: 95 % | BODY MASS INDEX: 41.75 KG/M2 | HEART RATE: 76 BPM | SYSTOLIC BLOOD PRESSURE: 118 MMHG | TEMPERATURE: 98 F | DIASTOLIC BLOOD PRESSURE: 58 MMHG | HEIGHT: 73 IN | RESPIRATION RATE: 18 BRPM | WEIGHT: 315 LBS

## 2017-01-24 PROCEDURE — 25000003 PHARM REV CODE 250: Performed by: PHYSICIAN ASSISTANT

## 2017-01-24 PROCEDURE — 63600175 PHARM REV CODE 636 W HCPCS: Performed by: NURSE PRACTITIONER

## 2017-01-24 PROCEDURE — 25000003 PHARM REV CODE 250: Performed by: STUDENT IN AN ORGANIZED HEALTH CARE EDUCATION/TRAINING PROGRAM

## 2017-01-24 PROCEDURE — 25000003 PHARM REV CODE 250: Performed by: NURSE PRACTITIONER

## 2017-01-24 PROCEDURE — 97110 THERAPEUTIC EXERCISES: CPT

## 2017-01-24 PROCEDURE — 97116 GAIT TRAINING THERAPY: CPT

## 2017-01-24 PROCEDURE — 11000004 HC SNF PRIVATE

## 2017-01-24 RX ADMIN — SODIUM CHLORIDE, PRESERVATIVE FREE 10 ML: 5 INJECTION INTRAVENOUS at 10:01

## 2017-01-24 RX ADMIN — DICLOFENAC 2 G: 10 GEL TOPICAL at 08:01

## 2017-01-24 RX ADMIN — GABAPENTIN 300 MG: 300 CAPSULE ORAL at 02:01

## 2017-01-24 RX ADMIN — METOPROLOL TARTRATE 25 MG: 25 TABLET ORAL at 08:01

## 2017-01-24 RX ADMIN — ASPIRIN 325 MG ORAL TABLET 325 MG: 325 PILL ORAL at 08:01

## 2017-01-24 RX ADMIN — TORSEMIDE 40 MG: 20 TABLET ORAL at 08:01

## 2017-01-24 RX ADMIN — Medication 220 MG: at 08:01

## 2017-01-24 RX ADMIN — Medication 10 ML: at 12:01

## 2017-01-24 RX ADMIN — Medication 10 ML: at 05:01

## 2017-01-24 RX ADMIN — SODIUM CHLORIDE, PRESERVATIVE FREE 10 ML: 5 INJECTION INTRAVENOUS at 02:01

## 2017-01-24 RX ADMIN — TAMSULOSIN HYDROCHLORIDE 0.4 MG: 0.4 CAPSULE ORAL at 08:01

## 2017-01-24 RX ADMIN — GABAPENTIN 300 MG: 300 CAPSULE ORAL at 05:01

## 2017-01-24 RX ADMIN — RAMIPRIL 5 MG: 2.5 CAPSULE ORAL at 08:01

## 2017-01-24 RX ADMIN — VENLAFAXINE 150 MG: 37.5 TABLET ORAL at 08:01

## 2017-01-24 RX ADMIN — ATORVASTATIN CALCIUM 40 MG: 20 TABLET, FILM COATED ORAL at 08:01

## 2017-01-24 RX ADMIN — PIPERACILLIN SODIUM,TAZOBACTAM SODIUM 4.5 G: 4; .5 INJECTION, POWDER, FOR SOLUTION INTRAVENOUS at 05:01

## 2017-01-24 RX ADMIN — LACTOBACILLUS ACIDOPHILUS / LACTOBACILLUS BULGARICUS 1 EACH: 100 MILLION CFU STRENGTH GRANULES at 08:01

## 2017-01-24 RX ADMIN — PANTOPRAZOLE SODIUM 40 MG: 40 TABLET, DELAYED RELEASE ORAL at 08:01

## 2017-01-24 NOTE — PLAN OF CARE
Problem: Pain, Acute (Adult)  Goal: Acceptable Pain Control/Comfort Level  Patient will demonstrate the desired outcomes by discharge/transition of care.   Outcome: Ongoing (interventions implemented as appropriate)  Pt. denies pain or discomfort
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
## 2017-01-24 NOTE — PT/OT/SLP DISCHARGE
Physical Therapy Discharge Summary    Janusz Montgomery Jr.  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  Please refer to prior PT noted date on 2017   for functional status.     Assessment:   Goals partially met. Pt wife able to assist patient as needed. Patient referred to HHPT  GOALS:   Physical Therapy Goals        Problem: Physical Therapy Goal    Goal Priority Disciplines Outcome Goal Variances Interventions   Physical Therapy Goal     PT/OT, PT Ongoing (interventions implemented as appropriate)     Description:  Goals to be met by: 2 weeks (tentative, pending medical progress)     Patient will increase functional independence with mobility by performin. Supine to sit with Modified [place] =  met  2. Sit to supine with Modified [place]=  met  3. Sit to stand transfer with Supervision and maintaining NWB LLE= met  4. Bed to chair transfer with Supervision using Rolling Walker and maintaining NWB LLE= met  5. Gait  x 25 feet with Stand-by Assistance using Rolling Walker.  and maintaining NWB LLE=  met    6. Wheelchair propulsion x150 feet with Modified [place] using bilateral upper extremities=  met    7. Lower extremity exercise program x20-30 reps per handout, with assistance as needed and gym therex=  met                   Reasons for Discontinuation of Therapy Services  Transfer to alternate level of care. and Satisfactory goal achievement.   Plan:  Patient Discharged to: Home with Home Health Service.

## 2017-01-24 NOTE — PLAN OF CARE
Problem: Pressure Ulcer Risk (Isidoro Scale) (Adult,Obstetrics,Pediatric)  Goal: Skin Integrity  Patient will demonstrate the desired outcomes by discharge/transition of care.   Outcome: Ongoing (interventions implemented as appropriate)  lle with external fixator sites dry and intact.

## 2017-01-24 NOTE — PLAN OF CARE
Problem: Physical Therapy Goal  Goal: Physical Therapy Goal  Goals to be met by: 2 weeks (tentative, pending medical progress)     Patient will increase functional independence with mobility by performin. Supine to sit with Modified [place] = met  2. Sit to supine with Modified [place]= met  3. Sit to stand transfer with Supervision and maintaining NWB LLE= met  4. Bed to chair transfer with Supervision using Rolling Walker and maintaining NWB LLE= met  5. Gait x 25 feet with Stand-by Assistance using Rolling Walker. and maintaining NWB LLE= met    6. Wheelchair propulsion x150 feet with Modified [place] using bilateral upper extremities= met    7. Lower extremity exercise program x20-30 reps per handout, with assistance as needed and gym therex= met   Outcome: Ongoing (interventions implemented as appropriate)  Gait goal not met

## 2017-01-24 NOTE — PT/OT/SLP PROGRESS
Physical Therapy  Treatment/discharge summary    Note: This note inadvertently  deleted and recopied here.Treatment note from 1/23/17. Patient did not discharge as planned, instead staying until 1/24/17. Discharge only cancelled w/ patient continuing to address goals.Please see discharge summary 1/24/2017  Georgia Andrea, PT 1/24/2017    Janusz Montgomery Jr.   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  Pt was agreeable to PT services.         Objective:    Functional Mobility:  Bed Mobility :   Supine to sit: Modified Independent   Sit to supine: Modified Independent       Functional Status:  Bed/Chair/WC: Modified [place]  Distance Walk: amb w/ Rw and close SBa w/ w/c follow 38 feet, 35 feet. seated rest break between trials. no LOB, NWB LLE  Walk: Exception/Household. Supervision.  Assistive Device: rolling walker  Gait Deviation(s): decreased sonali;decreased step length;decreased stride length;decreased toe-to-floor clearance  Wheelchair: Modified [place]  Distance Wheelchair: mod(I) on unit     Gait Pattern: swing-to gait  Gait Deviation(s): decreased sonali    PT Exercises (focusing on core):  1 Abdominal isometrics x 20 (2trials)  2 SLR- 2x20 reps  3 Leg lifts- 2x10 reps (to work lower abdominal region)  4 Gluteal sets- 2x20 reps  5 Abdominal crunches in supine- 2x10 reps    Addition treatment:  Pt was given a HEP (with the above listed exercises) and educated on how to remain safe upon return home with ex-fix still in place.    Patient left up in chair  with call button in reach.    Assessment:  Janusz Montgomery Jr. is a 72 y.o. 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  He will benefit from further PT services to continue to improve his strength.    Rehab identified problem list/impairments: Rehab identified problem list/impairments: weakness, impaired endurance, gait instability, impaired balance, decreased lower extremity function, orthopedic precautions    Rehab potential is good.    Activity tolerance: Good    Discharge recommendations: Discharge Facility/Level Of Care Needs: home with home health     Barriers to discharge: Barriers to Discharge: None    Equipment recommendations: Equipment Needed After Discharge: none (reports has needed DME)     GOALS:   Physical Therapy Goals        Problem: Physical Therapy Goal    Goal Priority Disciplines Outcome Goal Variances Interventions   Physical Therapy Goal     PT/OT, PT Ongoing (interventions implemented as appropriate)     Description:  Goals to be met by: 2 weeks (tentative, pending medical progress)     Patient will increase functional independence with mobility by performin. Supine to sit with Modified [place] =  met  2. Sit to supine with Modified [place]=  met  3. Sit to stand transfer with Supervision and maintaining NWB LLE= met  4. Bed to chair transfer with Supervision using Rolling Walker and maintaining NWB LLE= met  5. Gait  x 25 feet with Stand-by Assistance using Rolling Walker.  and maintaining NWB LLE=  met    6. Wheelchair propulsion x150 feet with Modified [place] using bilateral upper extremities=  met    7. Lower extremity exercise program x20-30 reps per handout, with assistance as needed and gym therex=  met                     PLAN:    Patient to be  seen 5 x/week  to address the above listed problems via gait training, therapeutic activities, therapeutic exercises, wheelchair management/training  Plan of Care expires: 01/29/17  Plan of Care reviewed with: patient    Georgia Andrea, PT  01/24/2017

## 2017-01-24 NOTE — PROGRESS NOTES
Pt. d/c to home accompanied by wife.d/c instructions along with wound care explained to pt. and pt. verbalized understanding.d/c sheet signed per pt. and copy given to pt.pt. escorted to front entrance and assisted into family vehicle.pt. personal belongings accompanied pt.

## 2017-01-24 NOTE — PT/OT/SLP PROGRESS
"Physical Therapy  Treatment    Janusz Montgomery Jr.   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    PT Received On: 17          Billable Minutes:  Gait [id number] and Therapeutic Exercise 15    Treatment Type: Treatment  PT/PTA: PTA     PTA Visit Number: 1       General Precautions: Standard, fall  Orthopedic Precautions: LLE non weight bearing   Braces:  (external fixator LLE)      Subjective:  "i'm going home."  Pain Ratin/10     Pain Addressed: Pre-medicate for activity  Pain Rating Post-Intervention: 0/10    Objective:  Patient found with: peripheral IV, PICC line (seated EOB )     Functional Mobility:  Bed Mobility :   Supine <> sit with mod I    Transfers:  Bed/Chair/WC: Supervision or Setup  Sit <> stand from EOB, w/c with SBA  SPT from bed > w/c with SBA    Gait:  Distance Walk: pt ambulated x 75 feet with NWB LLE, RW and CGA for safety.  w/c in tow  Walk: Minimal Contact Assistance  Assistive Device: rolling walker  Gait Deviation(s): decreased sonali;decreased toe-to-floor clearance;decreased weight-shifting ability;decreased velocity of limb motion;decreased step length;decreased stride length;decreased swing-to-stance ratio    Wheelchair: Modified [place]  Distance Wheelchair: 150 feet with RLE and (S) for IV pole     Balance:   Static Sit: Normal: Patient able to maintain steady balance without handhold support.  Dynamic Sit:  Normal: Patient accepts maximal challenge and can shift weight easily within full range in all directions.  Static Stand: Fair: Patient able to maintain balance with handhold support; may require occasional minimal assistance.  Dynamic stand: Fair: Patient accepts minimal challenge; able to maintain balance while turning head/trunk.      Additional Treatment:  LBE x 15 minutes with RLE for strengthening    Patient left up in chair with all lines intact and call button in " reach.    Assessment:  Janusz Montgomery Jr. is a 72 y.o. 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 Pt met all goals except gait goal per this date.  Pt required CGA for gait training for safety.  Will continue to benefit from HHPT.    Rehab identified problem list/impairments: Rehab identified problem list/impairments: weakness, impaired endurance, impaired self care skills, impaired functional mobilty, gait instability, impaired balance, decreased coordination, decreased upper extremity function, decreased lower extremity function, decreased safety awareness, decreased ROM    Rehab potential is fair.    Activity tolerance: Good    Discharge recommendations: Discharge Facility/Level Of Care Needs: home health PT     Barriers to discharge: Barriers to Discharge: Decreased caregiver support    Equipment recommendations: Equipment Needed After Discharge: none     GOALS:   Physical Therapy Goals        Problem: Physical Therapy Goal    Goal Priority Disciplines Outcome Goal Variances Interventions   Physical Therapy Goal     PT/OT, PT Ongoing (interventions implemented as appropriate)     Description:  Goals to be met by: 2 weeks (tentative, pending medical progress)     Patient will increase functional independence with mobility by performin. Supine to sit with Modified [place] =  met  2. Sit to supine with Modified [place]=  met  3. Sit to stand transfer with Supervision and maintaining NWB LLE= met  4. Bed to chair transfer with Supervision using Rolling Walker and maintaining NWB LLE= met  5. Gait  x 25 feet with Stand-by Assistance using Rolling Walker.  and maintaining NWB LLE=  Not met    6. Wheelchair propulsion x150 feet with Modified [place] using bilateral upper extremities=  met    7. Lower extremity exercise program x20-30 reps per handout, with assistance as needed and gym therex=  met                     PLAN:     Patient to be seen 5 x/week  to address the above listed problems via gait training, therapeutic activities, therapeutic exercises, wheelchair management/training  Plan of Care expires: 01/29/17  Plan of Care reviewed with: patient    UsmanRuthBrenda CAMARILLO Kassandra, PTA  01/24/2017

## 2017-01-25 ENCOUNTER — LAB VISIT (OUTPATIENT)
Dept: LAB | Facility: HOSPITAL | Age: 72
End: 2017-01-25
Attending: INTERNAL MEDICINE
Payer: MEDICARE

## 2017-01-25 ENCOUNTER — PATIENT OUTREACH (OUTPATIENT)
Dept: ADMINISTRATIVE | Facility: CLINIC | Age: 72
End: 2017-01-25
Payer: MEDICARE

## 2017-01-25 DIAGNOSIS — Z98.890 PERSONAL HISTORY OF SURGERY TO HEART AND GREAT VESSELS, PRESENTING HAZARDS TO HEALTH: Primary | ICD-10-CM

## 2017-01-25 DIAGNOSIS — I10 ESSENTIAL HYPERTENSION, MALIGNANT: ICD-10-CM

## 2017-01-25 LAB
ALBUMIN SERPL BCP-MCNC: 3.6 G/DL
ALP SERPL-CCNC: 123 U/L
ALT SERPL W/O P-5'-P-CCNC: 18 U/L
ANION GAP SERPL CALC-SCNC: 11 MMOL/L
AST SERPL-CCNC: 16 U/L
BASOPHILS # BLD AUTO: 0.06 K/UL
BASOPHILS NFR BLD: 0.6 %
BILIRUB SERPL-MCNC: 0.5 MG/DL
BUN SERPL-MCNC: 18 MG/DL
CALCIUM SERPL-MCNC: 9.4 MG/DL
CHLORIDE SERPL-SCNC: 103 MMOL/L
CO2 SERPL-SCNC: 27 MMOL/L
CREAT SERPL-MCNC: 1.1 MG/DL
CRP SERPL-MCNC: 12.8 MG/L
DIFFERENTIAL METHOD: ABNORMAL
EOSINOPHIL # BLD AUTO: 0.8 K/UL
EOSINOPHIL NFR BLD: 7.9 %
ERYTHROCYTE [DISTWIDTH] IN BLOOD BY AUTOMATED COUNT: 13.8 %
ERYTHROCYTE [SEDIMENTATION RATE] IN BLOOD BY WESTERGREN METHOD: 67 MM/HR
EST. GFR  (AFRICAN AMERICAN): >60 ML/MIN/1.73 M^2
EST. GFR  (NON AFRICAN AMERICAN): >60 ML/MIN/1.73 M^2
GIANT PLATELETS BLD QL SMEAR: PRESENT
GLUCOSE SERPL-MCNC: 83 MG/DL
HCT VFR BLD AUTO: 35.7 %
HGB BLD-MCNC: 11.8 G/DL
LYMPHOCYTES # BLD AUTO: 2 K/UL
LYMPHOCYTES NFR BLD: 19.9 %
MCH RBC QN AUTO: 32.4 PG
MCHC RBC AUTO-ENTMCNC: 33.1 %
MCV RBC AUTO: 98 FL
MONOCYTES # BLD AUTO: 0.6 K/UL
MONOCYTES NFR BLD: 6.1 %
NEUTROPHILS # BLD AUTO: 6.5 K/UL
NEUTROPHILS NFR BLD: 65.9 %
PLATELET # BLD AUTO: 173 K/UL
PMV BLD AUTO: 11.6 FL
POTASSIUM SERPL-SCNC: 4 MMOL/L
PROT SERPL-MCNC: 7.5 G/DL
RBC # BLD AUTO: 3.64 M/UL
SODIUM SERPL-SCNC: 141 MMOL/L
WBC # BLD AUTO: 9.89 K/UL

## 2017-01-25 PROCEDURE — 85651 RBC SED RATE NONAUTOMATED: CPT

## 2017-01-25 PROCEDURE — 85025 COMPLETE CBC W/AUTO DIFF WBC: CPT

## 2017-01-25 PROCEDURE — 80053 COMPREHEN METABOLIC PANEL: CPT

## 2017-01-25 PROCEDURE — 86140 C-REACTIVE PROTEIN: CPT

## 2017-01-25 PROCEDURE — 36415 COLL VENOUS BLD VENIPUNCTURE: CPT

## 2017-01-26 ENCOUNTER — HOSPITAL ENCOUNTER (EMERGENCY)
Facility: HOSPITAL | Age: 72
Discharge: HOME | End: 2017-01-27
Attending: EMERGENCY MEDICINE
Payer: MEDICARE

## 2017-01-26 ENCOUNTER — TELEPHONE (OUTPATIENT)
Dept: FAMILY MEDICINE | Facility: CLINIC | Age: 72
End: 2017-01-26

## 2017-01-26 VITALS
HEIGHT: 73 IN | RESPIRATION RATE: 18 BRPM | BODY MASS INDEX: 41.75 KG/M2 | TEMPERATURE: 99 F | OXYGEN SATURATION: 95 % | SYSTOLIC BLOOD PRESSURE: 132 MMHG | HEART RATE: 84 BPM | WEIGHT: 315 LBS | DIASTOLIC BLOOD PRESSURE: 62 MMHG

## 2017-01-26 DIAGNOSIS — T82.898A OCCLUDED PICC LINE, INITIAL ENCOUNTER: Primary | ICD-10-CM

## 2017-01-26 DIAGNOSIS — Z95.828 S/P PICC CENTRAL LINE PLACEMENT: ICD-10-CM

## 2017-01-26 PROCEDURE — 99283 EMERGENCY DEPT VISIT LOW MDM: CPT | Mod: 25

## 2017-01-26 PROCEDURE — 96374 THER/PROPH/DIAG INJ IV PUSH: CPT

## 2017-01-26 NOTE — TELEPHONE ENCOUNTER
----- Message from Hollie Peterson sent at 1/26/2017  2:26 PM CST -----  Contact: Abena with [facility]  Patient was admitted to by home health yesterday . She is calling to inform Dr Weathers he is being treated for -nursing, occupational therapy, and home health aid.  A return call is not needed she just wanted to inform the doctor .     [phone]    LL

## 2017-01-26 NOTE — ED AVS SNAPSHOT
[facility]  [place] [address]               Janusz Montgomery JrNgoc   2017 11:54 PM   ED    Description:  Male : 1945   Department:  [facility]           Your Care was Coordinated By:     Provider Role From To    Yaya Harvey MD Attending Provider 17 [phone] --      Reason for Visit     Vascular Access Problem           Diagnoses this Visit        Comments    Occluded PICC line, initial encounter    -  Primary     S/P PICC central line placement           ED Disposition     None           To Do List           Follow-up Information     Schedule an appointment as soon as possible for a visit with Miguelina Weathers MD.    Specialties:  Internal Medicine, Pediatrics    Contact information:    [address] [place] [address] [phone]          Follow up with [facility].    Specialty:  Emergency Medicine    Why:  As needed, If symptoms worsen    Contact information:    [place] [address] [phone]      [facility]     [facility] -  Assistance  Registered nurses in the [facility] provide clinical advisement, health education, appointment booking, and other advisory services.  Call for this free service at [phone].             Medications           Message regarding Medications     Verify the changes and/or additions to your medication regime listed below are the same as discussed with your clinician today.  If any of these changes or additions are incorrect, please notify your healthcare provider.        These medications were administered today        Dose Freq    heparin, porcine (PF) 100 unit/mL injection flush 500 Units 500 Units ED 1 Time    Sig: Inject 5 mLs (500 Units total) into the vein ED 1 Time.    Class: Normal    Route: Intravenous    Non-formulary Exception Code: Defer to pharmacy    heparin, porcine (PF) 100  unit/mL injection flush 500 Units 500 Units ED 1 Time    Sig: Inject 5 mLs (500 Units total) into the vein ED 1 Time.    Class: Normal    Route: Intravenous    Non-formulary Exception Code: Defer to pharmacy    sodium chloride 0.9% flush 5 mL 5 mL 2 times daily PRN    Sig: Inject 5 mLs into the vein 2 (two) times daily as needed for Line Care.    Class: Normal    Route: Intravenous      STOP taking these medications     loperamide (IMODIUM) 2 mg capsule Take 1 capsule (2 mg total) by mouth 4 (four) times daily as needed for Diarrhea.    promethazine (PHENERGAN) 12.5 MG Tab Take 1 tablet (12.5 mg total) by mouth every 6 (six) hours as needed.    teriparatide (FORTEO) 20 mcg/dose - 600 mcg/2.4 mL PnIj Inject 0.08 mLs (20 mcg total) into the skin once daily. HOLD UNTIL SEEN BY YOUR PCP           Verify that the below list of medications is an accurate representation of the medications you are currently taking.  If none reported, the list may be blank. If incorrect, please contact your healthcare provider. Carry this list with you in case of emergency.           Current Medications     acyclovir (ZOVIRAX) 800 MG Tab Take 1 tablet (800 mg total) by mouth once daily. HOLD UNTIL SEEN BY YOUR PCP    aspirin 325 MG tablet Take 1 tablet (325 mg total) by mouth 2 (two) times daily.    atorvastatin (LIPITOR) 40 MG tablet TAKE ONE TABLET BY MOUTH ONCE DAILY    gabapentin (NEURONTIN) 300 MG capsule Take 1 capsule (300 mg total) by mouth 3 (three) times daily.    lactobacillus acidophilus & bulgar (LACTINEX) 100 million cell packet Take 1 packet (1 each total) by mouth 2 (two) times daily.    metoprolol tartrate (LOPRESSOR) 25 MG tablet Take 1 tablet (25 mg total) by mouth 2 (two) times daily.    omeprazole (PRILOSEC) 20 MG capsule Take 2 capsules (40 mg total) by mouth once daily.    oxycodone-acetaminophen (PERCOCET)  mg per tablet Take 1 tablet by mouth every 4 (four) hours as needed (pain).    PIPERACILLIN  SODIUM/TAZOBACTAM (PIPERACILLIN-TAZOBACTAM 4.5G/100ML D5W IVPB, READY TO MIX,) Inject 100 mLs (4.5 g total) into the vein every 8 (eight) hours.    ramipril (ALTACE) 5 MG capsule TAKE ONE CAPSULE BY MOUTH ONCE DAILY    tamsulosin (FLOMAX) 0.4 mg Cp24 Take 1 capsule (0.4 mg total) by mouth once daily.    torsemide (DEMADEX) 20 MG Tab Take 2 tablets (40 mg total) by mouth once daily. 1 tablet (20 mg) daily for 3 days.    venlafaxine (EFFEXOR) 75 MG tablet Take 2 tablets (150 mg total) by mouth 2 (two) times daily.    vitamin D 1000 units Tab Take 1 tablet (1,000 Units total) by mouth once daily.    artificial tears (ISOPTO TEARS) 0.5 % ophthalmic solution Place 1 drop into both eyes as needed.    collagenase ointment Apply topically every Mon, Wed, Fri. Nursing to apply Santyl  nickel thick to wound bed and edges and cover with damp gauze (to activate santyl) daily and cover with telfa island dressing (mepore) over each pressure injury. Santyl has autolytic debridement properties and will promote healing.    CYANOCOBALAMIN, VITAMIN B-12, (VITAMIN B-12 ORAL) Take 2,500 mcg by mouth once daily.    diclofenac sodium 1 % Gel Apply 2 g topically once daily. Apply 2 g to left shoulder    heparin, porcine (PF) 100 unit/mL injection flush 500 Units Inject 5 mLs (500 Units total) into the vein ED 1 Time.    melatonin 3 mg Tab Take 6 mg by mouth nightly as needed.    nitroGLYCERIN (NITROSTAT) 0.4 MG SL tablet Place 1 tablet (0.4 mg total) under the tongue every 5 (five) minutes as needed for Chest pain.    polyethylene glycol (GLYCOLAX) 17 gram PwPk Take 17 g by mouth once daily.    senna-docusate 8.6-50 mg (PERICOLACE) 8.6-50 mg per tablet Take 1 tablet by mouth 2 (two) times daily.    sodium chloride 0.9% flush 5 mL Inject 5 mLs into the vein 2 (two) times daily as needed for Line Care.    zinc sulfate (ZINCATE) 220 (50) mg capsule Take 1 capsule (220 mg total) by mouth once daily.           Clinical Reference Information     "       Your Vitals Were     BP Pulse Temp Resp Height Weight    132/62 (BP Location: Right arm, Patient Position: Sitting) 84 99.2 °F (37.3 °C) (Oral) 18 6' 1" (1.854 m) 147.4 kg (325 lb)    SpO2 BMI             95% 42.88 kg/m2         Allergies as of 1/27/2017        Reactions    Bacitracin Hives, Itching      Immunizations Administered on Date of Encounter - 1/27/2017     None      ED Micro, Lab, POCT     None      ED Imaging Orders     None        Discharge Instructions       Return to the Emergency Department of any acute worsening of your symptoms or for any other concern.     You should return to the ED for fever/chills, shortness of breath, chest pain, weakness or "passing out".     Pt should take all medications as prescribed.    Pt should follow up with PCP as soon as possible.    The risks associated with not taking your medications as prescribed and not following up with your Primary Care doctor or sub specialist includes worsening of your condition, pain, disability, loss of function or livelihood, and death      TRAVIS Harvey M.D. 1:36 AM 1/27/2017        Discharge References/Attachments     CENTRAL LINE (CENTRAL VENOUS ACCESS DEVICE) (ENGLISH)      Your Scheduled Appointments     Jan 31, 2017  9:00 AM CST   Post OP with MD Reji Maravilla - Orthopedics ([facility] )    [address]  [place] [address]   [phone]            Feb 07, 2017  1:00 PM CST   Established Patient Visit with KAMAR Fierro - Infectious Diseases ([facility] )    [address]  [place] [address]   [phone]               [facility] complies with applicable Federal civil rights laws and does not discriminate on the basis of race, color, national origin, age, disability, or sex.        Language Assistance Services     ATTENTION: Language assistance services are available, free of charge. Please call [phone].      ATENCIÓN: Si habla " español, tiene a robison disposición servicios gratuitos de asistencia lingüística. Llame al [phone].     ANGIE Ý: N?u b?n nói Ti?ng Vi?t, có các d?ch v? h? tr? ngôn ng? mi?n phí dành cho b?n. G?i s? [phone].

## 2017-01-27 PROCEDURE — 36569 INSJ PICC 5 YR+ W/O IMAGING: CPT

## 2017-01-27 PROCEDURE — 25000003 PHARM REV CODE 250: Performed by: EMERGENCY MEDICINE

## 2017-01-27 RX ORDER — SODIUM CHLORIDE 0.9 % (FLUSH) 0.9 %
5 SYRINGE (ML) INJECTION 2 TIMES DAILY PRN
Status: DISCONTINUED | OUTPATIENT
Start: 2017-01-27 | End: 2017-01-27 | Stop reason: HOSPADM

## 2017-01-27 RX ORDER — HEPARIN 100 UNIT/ML
500 SYRINGE INTRAVENOUS
Status: DISCONTINUED | OUTPATIENT
Start: 2017-01-27 | End: 2017-01-27 | Stop reason: HOSPADM

## 2017-01-27 RX ORDER — HEPARIN 100 UNIT/ML
500 SYRINGE INTRAVENOUS
Status: COMPLETED | OUTPATIENT
Start: 2017-01-27 | End: 2017-01-27

## 2017-01-27 RX ADMIN — HEPARIN 500 UNITS: 100 SYRINGE at 12:01

## 2017-01-27 NOTE — DISCHARGE INSTRUCTIONS
"Return to the Emergency Department of any acute worsening of your symptoms or for any other concern.     You should return to the ED for fever/chills, shortness of breath, chest pain, weakness or "passing out".     Pt should take all medications as prescribed.    Pt should follow up with PCP as soon as possible.    The risks associated with not taking your medications as prescribed and not following up with your Primary Care doctor or sub specialist includes worsening of your condition, pain, disability, loss of function or livelihood, and death      TRAVIS Harvey M.D. 1:36 AM 1/27/2017      "

## 2017-01-27 NOTE — PROCEDURES
"Janusz Montgomery Jr. is a 72 y.o. male patient.    Temp: 99.2 °F (37.3 °C) (01/26/17 2326)  Pulse: 84 (01/26/17 2326)  Resp: 18 (01/26/17 2326)  BP: 132/62 (01/26/17 2326)  SpO2: 95 % (01/26/17 2326)  Weight: (!) 147.4 kg (325 lb) (01/26/17 2326)  Height: 6' 1" (185.4 cm) (01/26/17 2326)    PICC  Date/Time: 1/27/2017 3:10 AM  Consent Done: Yes  Time out: Immediately prior to procedure a time out was called to verify the correct patient, procedure, equipment, support staff and site/side marked as required  Indications: med administration  Anesthesia: local infiltration  Local anesthetic: lidocaine 1% without epinephrine  Anesthetic Total (mL): 3  Preparation: skin prepped with ChloraPrep  Skin prep agent dried: skin prep agent completely dried prior to procedure  Sterile barriers: all five maximum sterile barriers used - cap, mask, sterile gown, sterile gloves, and large sterile sheet  Hand hygiene: hand hygiene performed prior to central venous catheter insertion  Location details: right basilic  Catheter type: double lumen  Catheter size: 5 Fr  Catheter Length: 42cm    Ultrasound guidance: yes  Vessel Caliber: medium and patent, compressibility normal  Needle advanced into vessel with real time Ultrasound guidance.  Guidewire confirmed in vessel.  Sterile sheath used.  Manometry: esophageal manometry  Number of attempts: 1  Post-procedure: blood return through all ports, chlorhexidine patch and sterile dressing applied  Estimated blood loss (mL): 1          Navdeep Mahoney  1/27/2017  "

## 2017-01-27 NOTE — ED PROVIDER NOTES
Encounter Date: 1/26/2017    SCRIBE #1 NOTE: I, Jean Paul Jacob, am scribing for, and in the presence of,  Yaya Harvey MD. I have scribed the following portions of the note - Other sections scribed: HPI and ROS.       History     Chief Complaint   Patient presents with    Vascular Access Problem     unable to administer home IV therapy     Review of patient's allergies indicates:   Allergen Reactions    Bacitracin Hives and Itching     HPI Comments: CC: Vascular Access Problem      HPI: This 72 y.o male pt with hyperlipidemia, gout, morbid obesity, and CAD presents to the ED with evaluation of vascular access line. Pt is undergoing at home IV therapy secondary a left foot ulcer. Pt complains that he has been experiencing trouble administering at home IV therapy. He also complains that he has not been able to draw blood. Pt notes that he has to administer his meds 3x daily. Pt notes doing saline flushes after administering meds. He complains that it took a hour to flush the IV as opposed to the usual x30 minutes.     The history is provided by the patient. No  was used.     Past Medical History   Diagnosis Date    ALLERGIC RHINITIS     Anemia     Anxiety     Basal cell carcinoma      forehead    Basal cell carcinoma      left eylid    Basal cell carcinoma 08/18/2014     left prox cheek    Basal cell carcinoma 9/13/13     Right anterior shoulder    Basal cell carcinoma     Coronary artery disease involving native coronary artery of native heart without angina pectoris s/p RCA stent     Cortical cataract of both eyes 3/18/2016    Depression     Erectile dysfunction 3/24/2014    Essential hypertension     GERD (gastroesophageal reflux disease) 7/25/2012    Gout, arthritis     Grade III open fracture of left tibia and fibula s/p ex-fix on 7/1/16 and ORIF of left tibia on 7/15 7/6/2016    H/O: iritis     Helicobacter pylori (H. pylori) infection      Treated    Herpes  simplex keratoconjunctivitis 9/30/2015     - on acyclovir - followed by opthalmology, Dr. Uribe     Hyperlipidemia     Hypogonadism male     Mixed anxiety and depressive disorder     Morbid obesity     Obstructive sleep apnea on CPAP     Osteoarthritis of left knee 7/25/2012    Paroxysmal atrial fibrillation 7/6/2016    Prostate cancer 2/15/2016     - followed by urology, Dr. 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    Cerebral palsy 1/21/2016    Chronic bronchitis 1/21/2016    Chronic hepatitis 1/21/2016    Cirrhosis 1/21/2016    Complications of reattached extremity 1/21/2016    Cystic fibrosis 1/21/2016    Defibrillator discharge 8/1/2014    Dependence on renal dialysis 1/21/2016    Diabetes mellitus 12/14/2012    Diabetes mellitus 2/3/2014    Diabetic retinopathy 12/14/2012    Diabetic retinopathy 2/3/2014    Disorder of kidney and ureter 1/21/2016    Eczema 5/3/2013    Emphysema of lung 1/21/2016    Gastrostomy status 1/21/2016    Glaucoma 12/14/2012    Glaucoma 2/3/2014    Glomerulonephritis 1/21/2016    Heart failure 1/21/2016    Heart transplanted 1/21/2016    Hemolytic anemia 1/21/2016    Hepatitis B 1/21/2016    Hepatitis C 1/21/2016    HIV infection 1/21/2016    HSV (herpes simplex virus) anogenital infection 1/24/2013    Hypothyroidism 1/21/2016    Immune deficiency disorder 1/21/2016    Immune disorder 1/21/2016    Inflammatory bowel disease 1/21/2016    Interstitial nephritis chronic 1/21/2016     Intracranial hemorrhage 1/21/2016    Late complications of amputation stump 1/21/2016    Liver transplanted 1/21/2016    Lung transplanted 1/21/2016    Macular degeneration 12/14/2012    Malnutrition 1/21/2016    Mechanical heart valve present 8/1/2014    Multiple sclerosis 1/21/2016    Myocardial infarction 1/21/2016    Neoplasm of lip 1/21/2016    Osteoporosis 1/21/2016    Pacemaker 8/1/2014    Parkinson disease 1/21/2016    Peripheral vascular disease 1/21/2016    Phantom limb syndrome 1/21/2016    Pneumonia 1/21/2016    Pneumonia due to other staphylococcus 1/21/2016    Polyneuropathy 1/21/2016    Pulmonary embolism 1/21/2016    Recurrent upper respiratory infection (URI) 5/3/2013    Respiratory failure 1/21/2016    Retinal detachment 12/14/2012    Schizoaffective disorder 1/21/2016    Seizures 1/21/2016    Septic shock 1/21/2016    Sickle cell anemia 6/3/2015    Sickle cell trait 6/3/2015    Skin ulcer 1/21/2016    Spinal cord disease 1/21/2016    Strabismus 12/14/2012    Stroke 1/21/2016    Tobacco dependence 1/21/2016    Type 2 diabetes mellitus 1/21/2016    Urinary incontinence 1/21/2016    Urticaria 5/3/2013    Uveitis 12/14/2012     Past Surgical History   Procedure Laterality Date    Cardiac stenting x2      Squamous cell cancer removal x3 with mohs surgery      Tonsillectomy      Trus/bx      Cataract extraction w/  intraocular lens implant Right 3/29/2016     Dr. Conteh    Cataract extraction w/  intraocular lens implant Left 4/12/2016         Orif tibia fracture Left 07/15/2016    External fixation tibial fracture Left 07/01/2016    Total knee arthroplasty  10/2012     Family History   Problem Relation Age of Onset    Skin cancer Father     Lung cancer Father     Alzheimer's disease Mother     Hypertension Mother     Cancer Sister      colon    Peripheral vascular disease      Alzheimer's disease Sister     Melanoma Neg Hx      Psoriasis Neg Hx     Lupus Neg Hx     Eczema Neg Hx     Amblyopia Neg Hx     Blindness Neg Hx     Cataracts Neg Hx     Diabetes Neg Hx     Glaucoma Neg Hx     Macular degeneration Neg Hx     Retinal detachment Neg Hx     Strabismus Neg Hx     Stroke Neg Hx     Thyroid disease Neg Hx     Acne Neg Hx      Social History   Substance Use Topics    Smoking status: Never Smoker    Smokeless tobacco: Never Used    Alcohol use Yes      Comment: occasionally     Review of Systems   Constitutional: Negative for fever.   HENT: Negative for sore throat.    Respiratory: Negative for shortness of breath.    Cardiovascular: Negative for chest pain.   Gastrointestinal: Negative for abdominal pain and nausea.   Genitourinary: Negative for dysuria.   Musculoskeletal: Negative for back pain.   Skin: Negative for rash.   Neurological: Negative for dizziness, weakness and headaches.   Hematological: Does not bruise/bleed easily.       Physical Exam   Initial Vitals   BP Pulse Resp Temp SpO2   01/26/17 2326 01/26/17 2326 01/26/17 2326 01/26/17 2326 01/26/17 2326   132/62 84 18 99.2 °F (37.3 °C) 95 %     Physical Exam    Vitals reviewed.  Constitutional: He appears well-developed and well-nourished.   HENT:   Head: Normocephalic and atraumatic.   Eyes: EOM are normal. Pupils are equal, round, and reactive to light.   Neck: Normal range of motion. Neck supple.   Cardiovascular: Normal rate, regular rhythm, normal heart sounds and intact distal pulses.   Pulmonary/Chest: Breath sounds normal. No respiratory distress. He has no wheezes. He has no rhonchi. He has no rales.   Abdominal: Soft. Bowel sounds are normal.   Musculoskeletal: He exhibits tenderness.        Arms:  External fixation device to the left lower extremity.  Left heel with well dressed wound.   Neurological: He is alert and oriented to person, place, and time.   Skin: Skin is warm and dry.   Psychiatric: He has a normal mood and affect.         ED Course    Procedures  Labs Reviewed - No data to display       Medical decision-making:    The patient received a medical screening exam. If performed, the EKG was independently evaluated by me and is pending final cardiology evaluation.  If performed, all radiographic studies were independently evaluated by me and are pending final radiology evaluation. If labs were ordered, they were reviewed. Vital signs are independently assessed by me.  If performed, the pulse oximetry was independently evaluated by me.  I decided to obtain the patient's past medical record.  If available, I reviewed the patient's past medical record, including most recent labs and radiology reports.    Patient presents to the emergency department with inability to flush his right PICC line.  Patient has a nonhealing ulcer to the left heel.  Patient has an external fixation device to the left lower extremity.  Patient has a PICC line in place for administration of Zosyn 3 times daily.  Heparin for shows were utilized in order to try and clear.  The PICC line.  They would not flush.  Decision was made to remove the current PICC line and to replace his PICC line with another one.  In the meantime, a peripheral IV has been placed and the patient has started his Zosyn therapy.  Patient brought his Zosyn from home and administered his home medication.  If the PICC line is inserted without complication he is safe for discharge with already established primary care and orthopedic follow-up.    The results and physical exam findings were reviewed with the patient. Pt agrees with assessment, disposition and treatment plan and has no further questions or complaints at this time.    TRAVIS Harvey M.D. 1:35 AM 1/27/2017                        Scribe Attestation:   Scribe #1: I performed the above scribed service and the documentation accurately describes the services I performed. I attest to the accuracy of the note.    Attending Attestation:            Physician Attestation for Scribe:  Physician Attestation Statement for Scribe #1: I, Yaya Harvey MD, reviewed documentation, as scribed by Jean Paul Jacob in my presence, and it is both accurate and complete.                 ED Course     Clinical Impression:   The primary encounter diagnosis was Occluded PICC line, initial encounter. A diagnosis of S/P PICC central line placement was also pertinent to this visit.          Yaya Harvey MD  01/27/17 0138

## 2017-01-27 NOTE — ED TRIAGE NOTES
PT presents to ER due to vascular access problems. Pt states yesterday when the home health nurse came, she had trouble drawing blood out of his picc line. Pt states he receives antibiotics 3 times a day for his left foot. Pt states that his IV antibiotics will drip, but flushes will not flush. PT states he was able to get his antibiotics today, but that the flush meets heavy resistance.

## 2017-01-31 ENCOUNTER — PATIENT MESSAGE (OUTPATIENT)
Dept: INFECTIOUS DISEASES | Facility: CLINIC | Age: 72
End: 2017-01-31

## 2017-01-31 ENCOUNTER — OFFICE VISIT (OUTPATIENT)
Dept: ORTHOPEDICS | Facility: CLINIC | Age: 72
End: 2017-01-31
Payer: MEDICARE

## 2017-01-31 ENCOUNTER — HOSPITAL ENCOUNTER (OUTPATIENT)
Dept: RADIOLOGY | Facility: HOSPITAL | Age: 72
Discharge: HOME | End: 2017-01-31
Attending: ORTHOPAEDIC SURGERY
Payer: MEDICARE

## 2017-01-31 VITALS — DIASTOLIC BLOOD PRESSURE: 73 MMHG | HEART RATE: 68 BPM | SYSTOLIC BLOOD PRESSURE: 126 MMHG | HEIGHT: 73 IN

## 2017-01-31 DIAGNOSIS — S82.402N TRAUMATIC TYPE III OPEN FRACTURE OF SHAFT OF LEFT TIBIA AND FIBULA WITH NONUNION: Primary | ICD-10-CM

## 2017-01-31 DIAGNOSIS — Z98.890 POST-OPERATIVE STATE: Primary | ICD-10-CM

## 2017-01-31 DIAGNOSIS — S82.202N TRAUMATIC TYPE III OPEN FRACTURE OF SHAFT OF LEFT TIBIA AND FIBULA WITH NONUNION: Primary | ICD-10-CM

## 2017-01-31 DIAGNOSIS — Z98.890 POST-OPERATIVE STATE: ICD-10-CM

## 2017-01-31 DIAGNOSIS — L89.624 DECUBITUS ULCER OF LEFT HEEL, STAGE 4: ICD-10-CM

## 2017-01-31 PROCEDURE — 99024 POSTOP FOLLOW-UP VISIT: CPT | Mod: S$GLB,,, | Performed by: ORTHOPAEDIC SURGERY

## 2017-01-31 PROCEDURE — 99999 PR PBB SHADOW E&M-EST. PATIENT-LVL III: CPT | Mod: PBBFAC,,, | Performed by: ORTHOPAEDIC SURGERY

## 2017-01-31 PROCEDURE — 99499 UNLISTED E&M SERVICE: CPT | Mod: S$GLB,,, | Performed by: ORTHOPAEDIC SURGERY

## 2017-01-31 NOTE — PROGRESS NOTES
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 The patient also has a left heel ulcer. He is currently at home where he is receiving IV antibiotics and wound care 3x/week for his heel ulcer. Overall, he is doing well. Pain is 0/10.    PE:  External fixator in place; pin sites clean without erythema or drainage. Heel ulcer improved in character with granulation tissue present.    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 His heel wound is healing well. It was debrided in clinic today. He was seen today by our wound care specialist Chana Macdonald who agrees with the current plan. We will continue Santyl and Fabienne dressing changes 3 times per week.  The patient's IV antibiotics will be completed on 2/2/16. Once he is off of the antibiotics for several weeks, we will plan to obtain an indium scan to r/o infection in his tibia, which will guide further surgical management.  Follow-up in 2 weeks for re-assessment of heel wound.  X-rays of left ankle and left tibia at f/u.

## 2017-02-01 ENCOUNTER — LAB VISIT (OUTPATIENT)
Dept: LAB | Facility: HOSPITAL | Age: 72
End: 2017-02-01
Attending: INTERNAL MEDICINE
Payer: MEDICARE

## 2017-02-01 DIAGNOSIS — Z98.890 PERSONAL HISTORY OF SURGERY TO HEART AND GREAT VESSELS, PRESENTING HAZARDS TO HEALTH: ICD-10-CM

## 2017-02-01 DIAGNOSIS — Z75.1 PERSON AWAITING ADMISSION TO ADEQUATE FACILITY ELSEWHERE: ICD-10-CM

## 2017-02-01 DIAGNOSIS — L89.624 STAGE IV PRESSURE ULCER OF LEFT HEEL: Primary | ICD-10-CM

## 2017-02-01 LAB
ALBUMIN SERPL BCP-MCNC: 3.3 G/DL
ALP SERPL-CCNC: 117 U/L
ALT SERPL W/O P-5'-P-CCNC: 16 U/L
ANION GAP SERPL CALC-SCNC: 10 MMOL/L
AST SERPL-CCNC: 15 U/L
BILIRUB SERPL-MCNC: 0.6 MG/DL
BUN SERPL-MCNC: 17 MG/DL
CALCIUM SERPL-MCNC: 9 MG/DL
CHLORIDE SERPL-SCNC: 106 MMOL/L
CO2 SERPL-SCNC: 28 MMOL/L
CREAT SERPL-MCNC: 1.2 MG/DL
CRP SERPL-MCNC: 13 MG/L
EST. GFR  (AFRICAN AMERICAN): >60 ML/MIN/1.73 M^2
EST. GFR  (NON AFRICAN AMERICAN): >60 ML/MIN/1.73 M^2
GLUCOSE SERPL-MCNC: 93 MG/DL
POTASSIUM SERPL-SCNC: 3.6 MMOL/L
PROT SERPL-MCNC: 7.1 G/DL
SODIUM SERPL-SCNC: 144 MMOL/L

## 2017-02-01 PROCEDURE — 36415 COLL VENOUS BLD VENIPUNCTURE: CPT

## 2017-02-01 PROCEDURE — 86140 C-REACTIVE PROTEIN: CPT

## 2017-02-01 PROCEDURE — 80053 COMPREHEN METABOLIC PANEL: CPT

## 2017-02-03 ENCOUNTER — LAB VISIT (OUTPATIENT)
Dept: LAB | Facility: HOSPITAL | Age: 72
End: 2017-02-03
Attending: INTERNAL MEDICINE
Payer: MEDICARE

## 2017-02-03 DIAGNOSIS — Z98.890 PERSONAL HISTORY OF SURGERY TO HEART AND GREAT VESSELS, PRESENTING HAZARDS TO HEALTH: Primary | ICD-10-CM

## 2017-02-03 LAB — ERYTHROCYTE [SEDIMENTATION RATE] IN BLOOD BY WESTERGREN METHOD: 65 MM/HR

## 2017-02-03 PROCEDURE — 85651 RBC SED RATE NONAUTOMATED: CPT

## 2017-02-03 PROCEDURE — 36415 COLL VENOUS BLD VENIPUNCTURE: CPT

## 2017-02-07 ENCOUNTER — OFFICE VISIT (OUTPATIENT)
Dept: INFECTIOUS DISEASES | Facility: CLINIC | Age: 72
End: 2017-02-07
Payer: MEDICARE

## 2017-02-07 VITALS
TEMPERATURE: 98 F | DIASTOLIC BLOOD PRESSURE: 62 MMHG | WEIGHT: 315 LBS | SYSTOLIC BLOOD PRESSURE: 97 MMHG | HEART RATE: 54 BPM | HEIGHT: 73 IN | BODY MASS INDEX: 41.75 KG/M2

## 2017-02-07 DIAGNOSIS — L97.409: Primary | ICD-10-CM

## 2017-02-07 LAB
GRAM STN SPEC: NORMAL
GRAM STN SPEC: NORMAL

## 2017-02-07 PROCEDURE — 1159F MED LIST DOCD IN RCRD: CPT | Mod: S$GLB,,, | Performed by: PHYSICIAN ASSISTANT

## 2017-02-07 PROCEDURE — 3078F DIAST BP <80 MM HG: CPT | Mod: S$GLB,,, | Performed by: PHYSICIAN ASSISTANT

## 2017-02-07 PROCEDURE — 99499 UNLISTED E&M SERVICE: CPT | Mod: S$GLB,,, | Performed by: PHYSICIAN ASSISTANT

## 2017-02-07 PROCEDURE — 87077 CULTURE AEROBIC IDENTIFY: CPT

## 2017-02-07 PROCEDURE — 3074F SYST BP LT 130 MM HG: CPT | Mod: S$GLB,,, | Performed by: PHYSICIAN ASSISTANT

## 2017-02-07 PROCEDURE — 1126F AMNT PAIN NOTED NONE PRSNT: CPT | Mod: S$GLB,,, | Performed by: PHYSICIAN ASSISTANT

## 2017-02-07 PROCEDURE — 99999 PR PBB SHADOW E&M-EST. PATIENT-LVL V: CPT | Mod: PBBFAC,,, | Performed by: PHYSICIAN ASSISTANT

## 2017-02-07 PROCEDURE — 87070 CULTURE OTHR SPECIMN AEROBIC: CPT

## 2017-02-07 PROCEDURE — 99213 OFFICE O/P EST LOW 20 MIN: CPT | Mod: S$GLB,,, | Performed by: PHYSICIAN ASSISTANT

## 2017-02-07 PROCEDURE — 87186 SC STD MICRODIL/AGAR DIL: CPT

## 2017-02-07 PROCEDURE — 87075 CULTR BACTERIA EXCEPT BLOOD: CPT

## 2017-02-07 PROCEDURE — 1160F RVW MEDS BY RX/DR IN RCRD: CPT | Mod: S$GLB,,, | Performed by: PHYSICIAN ASSISTANT

## 2017-02-07 PROCEDURE — 87205 SMEAR GRAM STAIN: CPT

## 2017-02-07 PROCEDURE — 1157F ADVNC CARE PLAN IN RCRD: CPT | Mod: S$GLB,,, | Performed by: PHYSICIAN ASSISTANT
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
## 2017-02-08 ENCOUNTER — PATIENT MESSAGE (OUTPATIENT)
Dept: INFECTIOUS DISEASES | Facility: CLINIC | Age: 72
End: 2017-02-08

## 2017-02-08 ENCOUNTER — PATIENT MESSAGE (OUTPATIENT)
Dept: FAMILY MEDICINE | Facility: CLINIC | Age: 72
End: 2017-02-08

## 2017-02-08 ENCOUNTER — LAB VISIT (OUTPATIENT)
Dept: LAB | Facility: HOSPITAL | Age: 72
End: 2017-02-08
Attending: INTERNAL MEDICINE
Payer: MEDICARE

## 2017-02-08 DIAGNOSIS — L89.624 STAGE IV PRESSURE ULCER OF LEFT HEEL: Primary | ICD-10-CM

## 2017-02-08 DIAGNOSIS — I25.10 CORONARY ATHEROSCLEROSIS OF UNSPECIFIED TYPE OF VESSEL, NATIVE OR GRAFT: ICD-10-CM

## 2017-02-08 DIAGNOSIS — E55.9 VITAMIN D DEFICIENCY DISEASE: ICD-10-CM

## 2017-02-08 DIAGNOSIS — Z45.2 FITTING AND ADJUSTMENT OF VASCULAR CATHETER: ICD-10-CM

## 2017-02-08 DIAGNOSIS — E78.5 HYPERLIPIDEMIA, UNSPECIFIED HYPERLIPIDEMIA TYPE: Primary | ICD-10-CM

## 2017-02-08 DIAGNOSIS — I10 ESSENTIAL HYPERTENSION: ICD-10-CM

## 2017-02-08 LAB
ALBUMIN SERPL BCP-MCNC: 3.4 G/DL
ALP SERPL-CCNC: 111 U/L
ALT SERPL W/O P-5'-P-CCNC: 17 U/L
ANION GAP SERPL CALC-SCNC: 8 MMOL/L
AST SERPL-CCNC: 14 U/L
BASOPHILS # BLD AUTO: 0.08 K/UL
BASOPHILS NFR BLD: 0.9 %
BILIRUB SERPL-MCNC: 0.8 MG/DL
BUN SERPL-MCNC: 26 MG/DL
CALCIUM SERPL-MCNC: 9.2 MG/DL
CHLORIDE SERPL-SCNC: 104 MMOL/L
CO2 SERPL-SCNC: 29 MMOL/L
CREAT SERPL-MCNC: 1.4 MG/DL
CRP SERPL-MCNC: 10.8 MG/L
DIFFERENTIAL METHOD: ABNORMAL
EOSINOPHIL # BLD AUTO: 0.4 K/UL
EOSINOPHIL NFR BLD: 4.4 %
ERYTHROCYTE [DISTWIDTH] IN BLOOD BY AUTOMATED COUNT: 13.6 %
ERYTHROCYTE [SEDIMENTATION RATE] IN BLOOD BY WESTERGREN METHOD: 64 MM/HR
EST. GFR  (AFRICAN AMERICAN): 58 ML/MIN/1.73 M^2
EST. GFR  (NON AFRICAN AMERICAN): 51 ML/MIN/1.73 M^2
GLUCOSE SERPL-MCNC: 86 MG/DL
HCT VFR BLD AUTO: 34.5 %
HGB BLD-MCNC: 11.2 G/DL
LYMPHOCYTES # BLD AUTO: 2.5 K/UL
LYMPHOCYTES NFR BLD: 28 %
MCH RBC QN AUTO: 31.5 PG
MCHC RBC AUTO-ENTMCNC: 32.5 %
MCV RBC AUTO: 97 FL
MONOCYTES # BLD AUTO: 0.6 K/UL
MONOCYTES NFR BLD: 6.4 %
NEUTROPHILS # BLD AUTO: 5.4 K/UL
NEUTROPHILS NFR BLD: 60.2 %
PLATELET # BLD AUTO: 209 K/UL
PMV BLD AUTO: 10.5 FL
POTASSIUM SERPL-SCNC: 3.9 MMOL/L
PROT SERPL-MCNC: 7 G/DL
RBC # BLD AUTO: 3.55 M/UL
SODIUM SERPL-SCNC: 141 MMOL/L
WBC # BLD AUTO: 8.89 K/UL

## 2017-02-08 PROCEDURE — 85025 COMPLETE CBC W/AUTO DIFF WBC: CPT

## 2017-02-08 PROCEDURE — 80053 COMPREHEN METABOLIC PANEL: CPT

## 2017-02-08 PROCEDURE — 85651 RBC SED RATE NONAUTOMATED: CPT

## 2017-02-08 PROCEDURE — 36415 COLL VENOUS BLD VENIPUNCTURE: CPT

## 2017-02-08 PROCEDURE — 86140 C-REACTIVE PROTEIN: CPT

## 2017-02-09 ENCOUNTER — TELEPHONE (OUTPATIENT)
Dept: FAMILY MEDICINE | Facility: CLINIC | Age: 72
End: 2017-02-09

## 2017-02-09 ENCOUNTER — PATIENT MESSAGE (OUTPATIENT)
Dept: INFECTIOUS DISEASES | Facility: CLINIC | Age: 72
End: 2017-02-09

## 2017-02-09 ENCOUNTER — TELEPHONE (OUTPATIENT)
Dept: INFECTIOUS DISEASES | Facility: CLINIC | Age: 72
End: 2017-02-09

## 2017-02-09 DIAGNOSIS — L97.521 FOOT ULCER, LEFT, LIMITED TO BREAKDOWN OF SKIN: Primary | ICD-10-CM

## 2017-02-09 LAB — BACTERIA SPEC AEROBE CULT: NORMAL

## 2017-02-09 RX ORDER — CIPROFLOXACIN 750 MG/1
750 TABLET, FILM COATED ORAL EVERY 12 HOURS
Qty: 28 TABLET | Refills: 0 | Status: SHIPPED | OUTPATIENT
Start: 2017-02-09 | End: 2017-02-20

## 2017-02-09 NOTE — TELEPHONE ENCOUNTER
Spoke to Os[facility]  nurse, [facility] patient was discharged from service. [facility] patient met goals.

## 2017-02-09 NOTE — TELEPHONE ENCOUNTER
----- Message from Sonali Dorsey sent at 2/9/2017  1:56 PM CST -----  Contact: june nurse  Osiel stated that the pt was DC from [facility] today. [phone]

## 2017-02-09 NOTE — TELEPHONE ENCOUNTER
Saw patient in clinic 2/7/17. Cultures were repeated of left heel wound. Patient currently on zosyn. Repeat cultures growing pseudomonas, sensitive to cipro, resistant to zosyn. Initial cultures grew pseudomonas sensitive to zosyn and resistant to cipro. Will cover both species and continue the zosyn and have patient start on cipro 750 mg PO BID today. Treatment for 14 days. Will see patient in clinic for follow up. Continue following labs. Discussed with patient and patient and spouse verbally understood the therapy. Discussed with patient to not take any dairy products or multivitamins with cipro and to space them 2 hours apart.     Infectious Disease - Lab follow up 2/8/17     Dx: Left foot ulcer   Antibiotics: Zosyn 4.5g q6hr and cipro 750 mg PO BID  Estimated End Date: 14 days  WBC - 8.89  H/H - 11.2/34.5  Platelets - 209  Creatinine - 1.4  ESR - 10.8  CRP - 64  AST/ALT - 14/17

## 2017-02-13 LAB — BACTERIA SPEC ANAEROBE CULT: NORMAL

## 2017-02-14 ENCOUNTER — HOSPITAL ENCOUNTER (OUTPATIENT)
Dept: RADIOLOGY | Facility: HOSPITAL | Age: 72
Discharge: HOME | End: 2017-02-14
Attending: ORTHOPAEDIC SURGERY
Payer: MEDICARE

## 2017-02-14 ENCOUNTER — OFFICE VISIT (OUTPATIENT)
Dept: ORTHOPEDICS | Facility: CLINIC | Age: 72
End: 2017-02-14
Payer: MEDICARE

## 2017-02-14 VITALS
BODY MASS INDEX: 41.75 KG/M2 | SYSTOLIC BLOOD PRESSURE: 105 MMHG | DIASTOLIC BLOOD PRESSURE: 63 MMHG | WEIGHT: 315 LBS | HEART RATE: 68 BPM | HEIGHT: 73 IN

## 2017-02-14 DIAGNOSIS — L89.624 DECUBITUS ULCER OF LEFT HEEL, STAGE 4: ICD-10-CM

## 2017-02-14 DIAGNOSIS — S82.402N TRAUMATIC TYPE III OPEN FRACTURE OF SHAFT OF LEFT TIBIA AND FIBULA WITH NONUNION: Primary | ICD-10-CM

## 2017-02-14 DIAGNOSIS — S82.202N TRAUMATIC TYPE III OPEN FRACTURE OF SHAFT OF LEFT TIBIA AND FIBULA WITH NONUNION: Primary | ICD-10-CM

## 2017-02-14 PROCEDURE — 73610 X-RAY EXAM OF ANKLE: CPT | Mod: 26,LT,, | Performed by: RADIOLOGY

## 2017-02-14 PROCEDURE — 99999 PR PBB SHADOW E&M-EST. PATIENT-LVL III: CPT | Mod: PBBFAC,,, | Performed by: ORTHOPAEDIC SURGERY

## 2017-02-14 PROCEDURE — 73610 X-RAY EXAM OF ANKLE: CPT | Mod: TC,LT

## 2017-02-14 PROCEDURE — 99024 POSTOP FOLLOW-UP VISIT: CPT | Mod: S$GLB,,, | Performed by: ORTHOPAEDIC SURGERY

## 2017-02-14 PROCEDURE — 99499 UNLISTED E&M SERVICE: CPT | Mod: S$GLB,,, | Performed by: ORTHOPAEDIC SURGERY

## 2017-02-14 RX ORDER — TRAMADOL HYDROCHLORIDE 50 MG/1
50 TABLET ORAL EVERY 6 HOURS PRN
Qty: 30 TABLET | Refills: 2 | Status: ON HOLD | OUTPATIENT
Start: 2017-02-14 | End: 2017-03-03 | Stop reason: HOSPADM

## 2017-02-14 NOTE — PROGRESS NOTES
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 The patient also has a left heel ulcer. He is currently at home where he is receiving IV antibiotics and wound care 3x/week for his heel ulcer. Overall, he is doing well. Pain is 0/10. He last saw ID on 2/7/17 who made the decision to extend his antibiotics for an additional two weeks (end date: 2/21/17)    PE:  External fixator in place; pin sites clean without erythema or drainage. Heel ulcer substantially improved in character/depth with granulation tissue present.    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 His heel wound is healing well and looks substantially better today. We will continue Santyl and Fabienne dressing changes 3 times per week.    The patient's IV antibiotics will be completed on 2/21/17. Once he is off of the antibiotics for several weeks, we will plan to obtain an indium scan to r/o infection in his tibia, which will guide further surgical management.    Follow-up in 2 weeks for re-assessment of heel wound.

## 2017-02-15 ENCOUNTER — LAB VISIT (OUTPATIENT)
Dept: LAB | Facility: HOSPITAL | Age: 72
End: 2017-02-15
Attending: INTERNAL MEDICINE
Payer: MEDICARE

## 2017-02-15 DIAGNOSIS — I10 ESSENTIAL HYPERTENSION, MALIGNANT: Primary | ICD-10-CM

## 2017-02-15 DIAGNOSIS — I25.10 CORONARY ATHEROSCLEROSIS OF UNSPECIFIED TYPE OF VESSEL, NATIVE OR GRAFT: ICD-10-CM

## 2017-02-15 DIAGNOSIS — M17.12 DEGENERATIVE ARTHRITIS OF LEFT KNEE: ICD-10-CM

## 2017-02-15 LAB
ALBUMIN SERPL BCP-MCNC: 3.1 G/DL
ALP SERPL-CCNC: 94 U/L
ALT SERPL W/O P-5'-P-CCNC: 24 U/L
ANION GAP SERPL CALC-SCNC: 7 MMOL/L
ANISOCYTOSIS BLD QL SMEAR: SLIGHT
AST SERPL-CCNC: 21 U/L
BASOPHILS # BLD AUTO: 0.05 K/UL
BASOPHILS NFR BLD: 0.7 %
BILIRUB SERPL-MCNC: 0.6 MG/DL
BUN SERPL-MCNC: 18 MG/DL
CALCIUM SERPL-MCNC: 8.8 MG/DL
CHLORIDE SERPL-SCNC: 105 MMOL/L
CO2 SERPL-SCNC: 30 MMOL/L
CREAT SERPL-MCNC: 1.2 MG/DL
CRP SERPL-MCNC: 21.7 MG/L
DIFFERENTIAL METHOD: ABNORMAL
EOSINOPHIL # BLD AUTO: 0.3 K/UL
EOSINOPHIL NFR BLD: 4.6 %
ERYTHROCYTE [DISTWIDTH] IN BLOOD BY AUTOMATED COUNT: 13.7 %
ERYTHROCYTE [SEDIMENTATION RATE] IN BLOOD BY WESTERGREN METHOD: 66 MM/HR
EST. GFR  (AFRICAN AMERICAN): >60 ML/MIN/1.73 M^2
EST. GFR  (NON AFRICAN AMERICAN): >60 ML/MIN/1.73 M^2
GLUCOSE SERPL-MCNC: 91 MG/DL
HCT VFR BLD AUTO: 33.5 %
HGB BLD-MCNC: 10.9 G/DL
HYPOCHROMIA BLD QL SMEAR: ABNORMAL
LYMPHOCYTES # BLD AUTO: 1.3 K/UL
LYMPHOCYTES NFR BLD: 19 %
MCH RBC QN AUTO: 32 PG
MCHC RBC AUTO-ENTMCNC: 32.5 %
MCV RBC AUTO: 98 FL
MONOCYTES # BLD AUTO: 0.4 K/UL
MONOCYTES NFR BLD: 6.5 %
NEUTROPHILS # BLD AUTO: 4.7 K/UL
NEUTROPHILS NFR BLD: 69.2 %
PLATELET # BLD AUTO: 157 K/UL
PMV BLD AUTO: 11 FL
POTASSIUM SERPL-SCNC: 3.8 MMOL/L
PROT SERPL-MCNC: 6.7 G/DL
RBC # BLD AUTO: 3.41 M/UL
SODIUM SERPL-SCNC: 142 MMOL/L
WBC # BLD AUTO: 6.78 K/UL

## 2017-02-15 PROCEDURE — 86140 C-REACTIVE PROTEIN: CPT

## 2017-02-15 PROCEDURE — 36415 COLL VENOUS BLD VENIPUNCTURE: CPT

## 2017-02-15 PROCEDURE — 80053 COMPREHEN METABOLIC PANEL: CPT

## 2017-02-15 PROCEDURE — 85025 COMPLETE CBC W/AUTO DIFF WBC: CPT

## 2017-02-15 PROCEDURE — 85651 RBC SED RATE NONAUTOMATED: CPT

## 2017-02-18 ENCOUNTER — PATIENT MESSAGE (OUTPATIENT)
Dept: ORTHOPEDICS | Facility: CLINIC | Age: 72
End: 2017-02-18

## 2017-02-18 ENCOUNTER — HOSPITAL ENCOUNTER (EMERGENCY)
Facility: HOSPITAL | Age: 72
Discharge: HOME | End: 2017-02-18
Attending: EMERGENCY MEDICINE
Payer: MEDICARE

## 2017-02-18 VITALS
RESPIRATION RATE: 19 BRPM | HEART RATE: 82 BPM | WEIGHT: 315 LBS | TEMPERATURE: 98 F | SYSTOLIC BLOOD PRESSURE: 121 MMHG | DIASTOLIC BLOOD PRESSURE: 50 MMHG | BODY MASS INDEX: 41.75 KG/M2 | HEIGHT: 73 IN | OXYGEN SATURATION: 96 %

## 2017-02-18 DIAGNOSIS — R50.9 FEVER: Primary | ICD-10-CM

## 2017-02-18 LAB
ALBUMIN SERPL BCP-MCNC: 3.1 G/DL
ALP SERPL-CCNC: 106 U/L
ALT SERPL W/O P-5'-P-CCNC: 31 U/L
ANION GAP SERPL CALC-SCNC: 10 MMOL/L
AST SERPL-CCNC: 27 U/L
BASOPHILS # BLD AUTO: 0.02 K/UL
BASOPHILS NFR BLD: 0.4 %
BILIRUB SERPL-MCNC: 0.8 MG/DL
BILIRUB UR QL STRIP: NEGATIVE
BUN SERPL-MCNC: 20 MG/DL
CALCIUM SERPL-MCNC: 8.7 MG/DL
CHLORIDE SERPL-SCNC: 101 MMOL/L
CLARITY UR: CLEAR
CO2 SERPL-SCNC: 27 MMOL/L
COLOR UR: YELLOW
CREAT SERPL-MCNC: 1.5 MG/DL
CRP SERPL-MCNC: 53 MG/L
DEPRECATED S PYO AG THROAT QL EIA: NEGATIVE
DIFFERENTIAL METHOD: ABNORMAL
EOSINOPHIL # BLD AUTO: 0.4 K/UL
EOSINOPHIL NFR BLD: 7.3 %
ERYTHROCYTE [DISTWIDTH] IN BLOOD BY AUTOMATED COUNT: 13.8 %
ERYTHROCYTE [SEDIMENTATION RATE] IN BLOOD BY WESTERGREN METHOD: 68 MM/HR
EST. GFR  (AFRICAN AMERICAN): 54 ML/MIN/1.73 M^2
EST. GFR  (NON AFRICAN AMERICAN): 46 ML/MIN/1.73 M^2
FLUAV AG SPEC QL IA: NEGATIVE
FLUBV AG SPEC QL IA: NEGATIVE
GLUCOSE SERPL-MCNC: 122 MG/DL
GLUCOSE UR QL STRIP: NEGATIVE
HCT VFR BLD AUTO: 32.3 %
HGB BLD-MCNC: 10.7 G/DL
HGB UR QL STRIP: ABNORMAL
KETONES UR QL STRIP: NEGATIVE
LACTATE SERPL-SCNC: 1.1 MMOL/L
LEUKOCYTE ESTERASE UR QL STRIP: NEGATIVE
LYMPHOCYTES # BLD AUTO: 0.9 K/UL
LYMPHOCYTES NFR BLD: 17.6 %
MCH RBC QN AUTO: 31.9 PG
MCHC RBC AUTO-ENTMCNC: 33.1 %
MCV RBC AUTO: 96 FL
MICROSCOPIC COMMENT: ABNORMAL
MONOCYTES # BLD AUTO: 0.3 K/UL
MONOCYTES NFR BLD: 5.9 %
NEUTROPHILS # BLD AUTO: 3.4 K/UL
NEUTROPHILS NFR BLD: 68.8 %
NITRITE UR QL STRIP: NEGATIVE
PH UR STRIP: 5 [PH] (ref 5–8)
PLATELET # BLD AUTO: 142 K/UL
PMV BLD AUTO: 10.4 FL
POTASSIUM SERPL-SCNC: 3.3 MMOL/L
PROT SERPL-MCNC: 6.5 G/DL
PROT UR QL STRIP: NEGATIVE
RBC # BLD AUTO: 3.35 M/UL
RBC #/AREA URNS HPF: 8 /HPF (ref 0–4)
SODIUM SERPL-SCNC: 138 MMOL/L
SP GR UR STRIP: 1.01 (ref 1–1.03)
SPECIMEN SOURCE: NORMAL
URN SPEC COLLECT METH UR: ABNORMAL
UROBILINOGEN UR STRIP-ACNC: NEGATIVE EU/DL
WBC # BLD AUTO: 4.93 K/UL
WBC #/AREA URNS HPF: 1 /HPF (ref 0–5)

## 2017-02-18 PROCEDURE — 85025 COMPLETE CBC W/AUTO DIFF WBC: CPT

## 2017-02-18 PROCEDURE — 99284 EMERGENCY DEPT VISIT MOD MDM: CPT

## 2017-02-18 PROCEDURE — 87400 INFLUENZA A/B EACH AG IA: CPT | Mod: 59

## 2017-02-18 PROCEDURE — 87880 STREP A ASSAY W/OPTIC: CPT

## 2017-02-18 PROCEDURE — 86140 C-REACTIVE PROTEIN: CPT

## 2017-02-18 PROCEDURE — 87040 BLOOD CULTURE FOR BACTERIA: CPT

## 2017-02-18 PROCEDURE — 80053 COMPREHEN METABOLIC PANEL: CPT

## 2017-02-18 PROCEDURE — 81000 URINALYSIS NONAUTO W/SCOPE: CPT

## 2017-02-18 PROCEDURE — 85651 RBC SED RATE NONAUTOMATED: CPT

## 2017-02-18 PROCEDURE — 87081 CULTURE SCREEN ONLY: CPT

## 2017-02-18 PROCEDURE — 83605 ASSAY OF LACTIC ACID: CPT

## 2017-02-18 PROCEDURE — 87086 URINE CULTURE/COLONY COUNT: CPT

## 2017-02-18 NOTE — ED TRIAGE NOTES
Pt arrived to ED due to fever that started this morning. Pt.'s wife reports use of tylenol at home 1 hour ago. External fixator noted to l lower leg of pt. R upper arm PICC noted upon arrival . Reports use of IV antibiotics infusions at home,

## 2017-02-18 NOTE — ED AVS SNAPSHOT
[facility]  [place] [address]               Janusz Montgomery JrNgoc   2017 11:40 AM   ED    Description:  Male : 1945   Department:  [facility]           Your Care was Coordinated By:     Provider Role From To    Romero Grossman MD Attending Provider 17 1141 --      Reason for Visit     Fever           Diagnoses this Visit        Comments    Fever    -  Primary       ED Disposition     ED Disposition Condition Comment    Discharge  Our goal in the emergency department is to always give you outstanding care and exceptional service. You may receive a survey by mail or e-mail in the next week regarding your experience in our ED. We would greatly appreciate your completing and returnin g the survey. Your feedback provides us with a way to recognize our staff who give very good care and it helps us learn how to improve when your experience was below our aspiration of excellence.              To Do List           Follow-up Information     Follow up with Walter Cortés MD. Schedule an appointment as soon as possible for a visit on 2017.    Specialty:  Orthopedic Surgery    Why:  For repeat evaluation    Contact information:    [address]  [facility] [address]  [phone]          Follow up with [facility].    Specialty:  Emergency Medicine    Why:  If symptoms worsen    Contact information:    [place] [id number] [phone]      [facility]     [facility] On [facility] Nurse Care Line -  Assistance  Registered nurses in the [facility] provide clinical advisement, health education, appointment booking, and other advisory services.  Call for this free service at [phone].             Medications           Message regarding Medications     Verify the changes and/or additions to your medication regime listed below are the same as discussed with your  clinician today.  If any of these changes or additions are incorrect, please notify your healthcare provider.             Verify that the below list of medications is an accurate representation of the medications you are currently taking.  If none reported, the list may be blank. If incorrect, please contact your healthcare provider. Carry this list with you in case of emergency.           Current Medications     acyclovir (ZOVIRAX) 800 MG Tab Take 1 tablet (800 mg total) by mouth once daily. HOLD UNTIL SEEN BY YOUR PCP    artificial tears (ISOPTO TEARS) 0.5 % ophthalmic solution Place 1 drop into both eyes as needed.    atorvastatin (LIPITOR) 40 MG tablet TAKE ONE TABLET BY MOUTH ONCE DAILY    ciprofloxacin HCl (CIPRO) 750 MG tablet Take 1 tablet (750 mg total) by mouth every 12 (twelve) hours.    collagenase ointment Apply topically every Mon, Wed, Fri. Nursing to apply Santyl  nickel thick to wound bed and edges and cover with damp gauze (to activate santyl) daily and cover with telfa island dressing (mepore) over each pressure injury. Santyl has autolytic debridement properties and will promote healing.    CYANOCOBALAMIN, VITAMIN B-12, (VITAMIN B-12 ORAL) Take 2,500 mcg by mouth once daily.    diclofenac sodium 1 % Gel Apply 2 g topically once daily. Apply 2 g to left shoulder    gabapentin (NEURONTIN) 300 MG capsule Take 1 capsule (300 mg total) by mouth 3 (three) times daily.    lactobacillus acidophilus & bulgar (LACTINEX) 100 million cell packet Take 1 packet (1 each total) by mouth 2 (two) times daily.    melatonin 3 mg Tab Take 6 mg by mouth nightly as needed.    metoprolol tartrate (LOPRESSOR) 25 MG tablet Take 1 tablet (25 mg total) by mouth 2 (two) times daily.    nitroGLYCERIN (NITROSTAT) 0.4 MG SL tablet Place 1 tablet (0.4 mg total) under the tongue every 5 (five) minutes as needed for Chest pain.    omeprazole (PRILOSEC) 20 MG capsule Take 2 capsules (40 mg total) by mouth once daily.     "polyethylene glycol (GLYCOLAX) 17 gram PwPk Take 17 g by mouth once daily.    ramipril (ALTACE) 5 MG capsule TAKE ONE CAPSULE BY MOUTH ONCE DAILY    senna-docusate 8.6-50 mg (PERICOLACE) 8.6-50 mg per tablet Take 1 tablet by mouth 2 (two) times daily.    tamsulosin (FLOMAX) 0.4 mg Cp24 Take 1 capsule (0.4 mg total) by mouth once daily.    torsemide (DEMADEX) 20 MG Tab Take 2 tablets (40 mg total) by mouth once daily. 1 tablet (20 mg) daily for 3 days.    tramadol (ULTRAM) 50 mg tablet Take 1 tablet (50 mg total) by mouth every 6 (six) hours as needed for Pain.    venlafaxine (EFFEXOR) 75 MG tablet Take 2 tablets (150 mg total) by mouth 2 (two) times daily.    vitamin D 1000 units Tab Take 1 tablet (1,000 Units total) by mouth once daily.    zinc sulfate (ZINCATE) 220 (50) mg capsule Take 1 capsule (220 mg total) by mouth once daily.    aspirin 325 MG tablet Take 1 tablet (325 mg total) by mouth 2 (two) times daily.           Clinical Reference Information           Your Vitals Were     BP Pulse Temp Resp Height Weight    124/56 72 97.9 °F (36.6 °C) (Oral) 20 6' 1" (1.854 m) 149.7 kg (330 lb)    SpO2 BMI             95% 43.54 kg/m2         Allergies as of 2/18/2017        Reactions    Bacitracin Hives, Itching      Immunizations Administered on Date of Encounter - 2/18/2017     None      ED Micro, Lab, POCT     Start Ordered       Status Ordering Provider    02/18/17 1509 02/18/17 1508  Sedimentation rate, manual  STAT      Ordered     02/18/17 1509 02/18/17 1508  C-reactive protein  STAT      Ordered     02/18/17 1200 02/18/17 1200  CBC auto differential  STAT      Final result     02/18/17 1200 02/18/17 1200  Comprehensive metabolic panel  STAT      Final result     02/18/17 1200 02/18/17 1200  Urinalysis - Clean Catch  STAT      Final result     02/18/17 1200 02/18/17 1200  Urine culture  STAT      In process     02/18/17 1200 02/18/17 1200  Blood culture #1  STAT     Comments:  Blood Culture #1    In process     " 02/18/17 1200 02/18/17 1200  Blood culture #2  STAT     Comments:  Blood Culture #2    In process     02/18/17 1200 02/18/17 1200  Lactic acid, plasma  STAT      Final result     02/18/17 1200 02/18/17 1200  Influenza antigen Nasopharyngeal Swab  STAT      Final result     02/18/17 1200 02/18/17 1200  Throat Screen, Rapid (Strep Screen)  Once      Final result     02/18/17 1200 02/18/17 1200  Strep A culture, throat  Once      In process     02/18/17 1200 02/18/17 1200  Urinalysis Microscopic  Once      Final result       ED Imaging Orders     Start Ordered       Status Ordering Provider    02/18/17 1204 02/18/17 1204  X-Ray Tibia Fibula 2 View Left  1 time imaging      Final result     02/18/17 1200 02/18/17 1200  X-Ray Chest AP Portable  1 time imaging      Final result         Discharge Instructions         Febrile Illness, Uncertain Cause (Adult)  You have a fever, but the cause is not certain. A fever is a natural reaction of the body to an illness such as infection due to a virus or bacteria. In most cases, the temperature itself is not harmful. It actually helps the body fight infections. A fever does not need to be treated unless you feel very uncomfortable.  Sometimes a fever can be an early sign of a more serious infection, so make sure to follow up if your condition worsens.  Home care  Unless given other instructions by your healthcare provider, follow these guidelines when caring for yourself at home.  General care  · If your symptoms are severe, rest at home for the first 2 to 3 days. When you resume activity, don't let yourself get too tired.  · Do not smoke. Also avoid being exposed to secondhand smoke.  · Your appetite may be poor, so a light diet is fine. Avoid dehydration by drinking 6 to 8 glasses of fluids per day (such as water, soft drinks, sports drinks, juices, tea, or soup). Extra fluids will help loosen secretions in the nose and lungs.  Medicines  · You can take acetaminophen or ibuprofen  for pain, unless you were given a different fever-reducing/pain medicine to use. (Note: If you have chronic liver or kidney disease or have ever had a stomach ulcer or gastrointestinal bleeding, talk with your healthcare provider before using these medicines. Also talk to your provider if you are taking medicine to prevent blood clots.) Aspirin should never be given to anyone younger than 18 years of age who is ill with a viral infection or fever. It may cause severe liver or brain damage.  · If you were given antibiotics, take them until they are used up, or your healthcare provider tells you to stop. It is important to finish the antibiotics even though you feel better. This is to make sure the infection has cleared. Be aware that antibiotics are not usually given for a fever with an unknown cause.  · Over-the-counter medicines will not shorten the duration of the illness. However, they may be helpful for the following symptoms: cough, sore throat, or nasal and sinus congestion. Ask your pharmacist for product suggestions. (Note: Do not use decongestants if you have high blood pressure.)  Follow-up care  Follow up with your healthcare provider, or as advised.  · If a culture was done, you will be notified if your treatment needs to be changed. You can call as directed for the results.  · If X-rays, a CT, or an ultrasound were done, a specialist will review them. You will be notified of any findings that may affect your care.  Call 911  Contact emergency services right away if any of these occur:  · Trouble breathing or swallowing, or wheezing  · Chest pain  · Confusion  · Extreme drowsiness or trouble awakening  · Fainting or loss of consciousness  · Rapid heart rate  · Low blood pressure  · Vomiting blood, or large amounts of blood in stool  · Seizure  When to seek medical advice  Call your healthcare provider right away if any of these occur:  · Cough with lots of colored sputum (mucus) or blood in your  sputum  · Severe headache  · Face, neck, throat, or ear pain  · Feeling drowsy  · Abdominal pain  · Repeated vomiting or diarrhea  · Joint pain or a new rash  · Burning when urinating  · Fever of 100.4°F (38°C) or higher, that does not get better after taking fever-reducing medicine  · Feeling weak or dizzy  Date Last Reviewed: 7/30/2015  © 3474-0891 [employer]. [address] [place] [address], [place] [address]. All rights reserved. This information is not intended as a substitute for professional medical care. Always follow your healthcare professional's instructions.          Your Scheduled Appointments     Feb 21, 2017  2:00 PM CST   Health Assessment with HRA, [facility]   [facility])    [facility] [place] [address] [phone]            Feb 22, 2017  3:30 PM CST   Established Patient Visit with KAMAR Fierro - Infectious Diseases ([facility] )    [address]  [place] [address]   [phone]            Mar 01, 2017  1:00 PM CST   Post OP with KAMAR Kapoor - Orthopedics ([address] )    [address] [facility] [place] [address]   [phone]            Apr 03, 2017  8:00 AM CDT   Fasting Lab with LAB, [facility]    [address] [facility] [place] [address] [phone]            Apr 07, 2017  2:00 PM CDT   Established Patient Visit with Miguelina Weathers MD   [facility])    [address] [facility] [place] [address] [phone]               [facility] complies with applicable Federal civil rights laws and does not discriminate on the basis of race, color, national origin, age, disability, or sex.        Language Assistance Services     ATTENTION: Language assistance services are available, free of charge. Please call [phone].      ATENCIÓN: Si habla español, tiene a robison disposición servicios gratuitos de asistencia  lingüística. [facility] [phone].     ANGIE Ý: N?u b?n nói Ti?ng Vi?t, có các d?ch v? h? tr? ngôn ng? mi?n phí dành cho b?n. G?i s? [phone].
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
## 2017-02-18 NOTE — DISCHARGE INSTRUCTIONS
Febrile Illness, Uncertain Cause (Adult)  You have a fever, but the cause is not certain. A fever is a natural reaction of the body to an illness such as infection due to a virus or bacteria. In most cases, the temperature itself is not harmful. It actually helps the body fight infections. A fever does not need to be treated unless you feel very uncomfortable.  Sometimes a fever can be an early sign of a more serious infection, so make sure to follow up if your condition worsens.  Home care  Unless given other instructions by your healthcare provider, follow these guidelines when caring for yourself at home.  General care  · If your symptoms are severe, rest at home for the first 2 to 3 days. When you resume activity, don't let yourself get too tired.  · Do not smoke. Also avoid being exposed to secondhand smoke.  · Your appetite may be poor, so a light diet is fine. Avoid dehydration by drinking 6 to 8 glasses of fluids per day (such as water, soft drinks, sports drinks, juices, tea, or soup). Extra fluids will help loosen secretions in the nose and lungs.  Medicines  · You can take acetaminophen or ibuprofen for pain, unless you were given a different fever-reducing/pain medicine to use. (Note: If you have chronic liver or kidney disease or have ever had a stomach ulcer or gastrointestinal bleeding, talk with your healthcare provider before using these medicines. Also talk to your provider if you are taking medicine to prevent blood clots.) Aspirin should never be given to anyone younger than 18 years of age who is ill with a viral infection or fever. It may cause severe liver or brain damage.  · If you were given antibiotics, take them until they are used up, or your healthcare provider tells you to stop. It is important to finish the antibiotics even though you feel better. This is to make sure the infection has cleared. Be aware that antibiotics are not usually given for a fever with an unknown  cause.  · Over-the-counter medicines will not shorten the duration of the illness. However, they may be helpful for the following symptoms: cough, sore throat, or nasal and sinus congestion. Ask your pharmacist for product suggestions. (Note: Do not use decongestants if you have high blood pressure.)  Follow-up care  Follow up with your healthcare provider, or as advised.  · If a culture was done, you will be notified if your treatment needs to be changed. You can call as directed for the results.  · If X-rays, a CT, or an ultrasound were done, a specialist will review them. You will be notified of any findings that may affect your care.  Call 911  Contact emergency services right away if any of these occur:  · Trouble breathing or swallowing, or wheezing  · Chest pain  · Confusion  · Extreme drowsiness or trouble awakening  · Fainting or loss of consciousness  · Rapid heart rate  · Low blood pressure  · Vomiting blood, or large amounts of blood in stool  · Seizure  When to seek medical advice  Call your healthcare provider right away if any of these occur:  · Cough with lots of colored sputum (mucus) or blood in your sputum  · Severe headache  · Face, neck, throat, or ear pain  · Feeling drowsy  · Abdominal pain  · Repeated vomiting or diarrhea  · Joint pain or a new rash  · Burning when urinating  · Fever of 100.4°F (38°C) or higher, that does not get better after taking fever-reducing medicine  · Feeling weak or dizzy  Date Last Reviewed: 7/30/2015  © 0894-6341 [employer]. [address] [place] [address], [place] [address]. All rights reserved. This information is not intended as a substitute for professional medical care. Always follow your healthcare professional's instructions.

## 2017-02-19 ENCOUNTER — NURSE TRIAGE (OUTPATIENT)
Dept: ADMINISTRATIVE | Facility: CLINIC | Age: 72
End: 2017-02-19

## 2017-02-19 ENCOUNTER — PATIENT MESSAGE (OUTPATIENT)
Dept: INFECTIOUS DISEASES | Facility: CLINIC | Age: 72
End: 2017-02-19

## 2017-02-19 NOTE — TELEPHONE ENCOUNTER
Reason for Disposition   [1] Fever > 101 F (38.3 C) AND [2] age > 60    Protocols used:  FEVER-A-AH    Wife calling with concerns of fever.  Pt went to ED yesterday and had lab work up.  ED MD did not see anything concerning from the lab results.  Pt already on aggressive antibiotics (pt taking antibiotics at home and has a PICC line).  Advised Wife to continue Tylenol for fever and follow up with PCP.

## 2017-02-20 ENCOUNTER — OFFICE VISIT (OUTPATIENT)
Dept: INFECTIOUS DISEASES | Facility: CLINIC | Age: 72
End: 2017-02-20
Payer: MEDICARE

## 2017-02-20 ENCOUNTER — INFUSION (OUTPATIENT)
Dept: INFECTIOUS DISEASES | Facility: HOSPITAL | Age: 72
End: 2017-02-20
Attending: INTERNAL MEDICINE
Payer: MEDICARE

## 2017-02-20 ENCOUNTER — TELEPHONE (OUTPATIENT)
Dept: FAMILY MEDICINE | Facility: CLINIC | Age: 72
End: 2017-02-20

## 2017-02-20 ENCOUNTER — TELEPHONE (OUTPATIENT)
Dept: ORTHOPEDICS | Facility: CLINIC | Age: 72
End: 2017-02-20

## 2017-02-20 ENCOUNTER — NURSE TRIAGE (OUTPATIENT)
Dept: ADMINISTRATIVE | Facility: CLINIC | Age: 72
End: 2017-02-20

## 2017-02-20 VITALS
DIASTOLIC BLOOD PRESSURE: 59 MMHG | WEIGHT: 315 LBS | TEMPERATURE: 99 F | SYSTOLIC BLOOD PRESSURE: 123 MMHG | HEART RATE: 82 BPM | HEIGHT: 73 IN | BODY MASS INDEX: 41.75 KG/M2

## 2017-02-20 DIAGNOSIS — T50.905A DRUG REACTION, INITIAL ENCOUNTER: ICD-10-CM

## 2017-02-20 DIAGNOSIS — R50.2 DRUG-INDUCED FEVER: ICD-10-CM

## 2017-02-20 DIAGNOSIS — L97.521 FOOT ULCER, LEFT, LIMITED TO BREAKDOWN OF SKIN: Primary | ICD-10-CM

## 2017-02-20 DIAGNOSIS — L97.521 FOOT ULCER, LEFT, LIMITED TO BREAKDOWN OF SKIN: ICD-10-CM

## 2017-02-20 LAB
ALBUMIN SERPL BCP-MCNC: 3.1 G/DL
ALP SERPL-CCNC: 106 U/L
ALT SERPL W/O P-5'-P-CCNC: 41 U/L
ANION GAP SERPL CALC-SCNC: 8 MMOL/L
AST SERPL-CCNC: 34 U/L
BACTERIA THROAT CULT: NORMAL
BACTERIA UR CULT: NO GROWTH
BASOPHILS # BLD AUTO: 0.02 K/UL
BASOPHILS NFR BLD: 0.4 %
BILIRUB SERPL-MCNC: 0.6 MG/DL
BUN SERPL-MCNC: 17 MG/DL
CALCIUM SERPL-MCNC: 9 MG/DL
CHLORIDE SERPL-SCNC: 104 MMOL/L
CO2 SERPL-SCNC: 27 MMOL/L
CREAT SERPL-MCNC: 1.2 MG/DL
CRP SERPL-MCNC: 70.2 MG/L
DIFFERENTIAL METHOD: ABNORMAL
EOSINOPHIL # BLD AUTO: 0.4 K/UL
EOSINOPHIL NFR BLD: 8 %
ERYTHROCYTE [DISTWIDTH] IN BLOOD BY AUTOMATED COUNT: 14.2 %
ERYTHROCYTE [SEDIMENTATION RATE] IN BLOOD BY WESTERGREN METHOD: 88 MM/HR
EST. GFR  (AFRICAN AMERICAN): >60 ML/MIN/1.73 M^2
EST. GFR  (NON AFRICAN AMERICAN): >60 ML/MIN/1.73 M^2
GLUCOSE SERPL-MCNC: 101 MG/DL
HCT VFR BLD AUTO: 34.4 %
HGB BLD-MCNC: 11.3 G/DL
LYMPHOCYTES # BLD AUTO: 1.5 K/UL
LYMPHOCYTES NFR BLD: 30 %
MCH RBC QN AUTO: 31.9 PG
MCHC RBC AUTO-ENTMCNC: 32.8 %
MCV RBC AUTO: 97 FL
MONOCYTES # BLD AUTO: 0.4 K/UL
MONOCYTES NFR BLD: 7.8 %
NEUTROPHILS # BLD AUTO: 2.6 K/UL
NEUTROPHILS NFR BLD: 53.6 %
PLATELET # BLD AUTO: 162 K/UL
PMV BLD AUTO: 10.7 FL
POTASSIUM SERPL-SCNC: 3.6 MMOL/L
PROT SERPL-MCNC: 7.1 G/DL
RBC # BLD AUTO: 3.54 M/UL
SODIUM SERPL-SCNC: 139 MMOL/L
WBC # BLD AUTO: 4.87 K/UL

## 2017-02-20 PROCEDURE — 1125F AMNT PAIN NOTED PAIN PRSNT: CPT | Mod: S$GLB,,, | Performed by: INTERNAL MEDICINE

## 2017-02-20 PROCEDURE — 1159F MED LIST DOCD IN RCRD: CPT | Mod: S$GLB,,, | Performed by: INTERNAL MEDICINE

## 2017-02-20 PROCEDURE — 85025 COMPLETE CBC W/AUTO DIFF WBC: CPT

## 2017-02-20 PROCEDURE — 80053 COMPREHEN METABOLIC PANEL: CPT

## 2017-02-20 PROCEDURE — 36591 DRAW BLOOD OFF VENOUS DEVICE: CPT

## 2017-02-20 PROCEDURE — 1160F RVW MEDS BY RX/DR IN RCRD: CPT | Mod: S$GLB,,, | Performed by: INTERNAL MEDICINE

## 2017-02-20 PROCEDURE — 85651 RBC SED RATE NONAUTOMATED: CPT

## 2017-02-20 PROCEDURE — 36415 COLL VENOUS BLD VENIPUNCTURE: CPT

## 2017-02-20 PROCEDURE — 99999 PR PBB SHADOW E&M-EST. PATIENT-LVL III: CPT | Mod: PBBFAC,,, | Performed by: INTERNAL MEDICINE

## 2017-02-20 PROCEDURE — 1157F ADVNC CARE PLAN IN RCRD: CPT | Mod: S$GLB,,, | Performed by: INTERNAL MEDICINE

## 2017-02-20 PROCEDURE — 99215 OFFICE O/P EST HI 40 MIN: CPT | Mod: S$GLB,,, | Performed by: INTERNAL MEDICINE

## 2017-02-20 PROCEDURE — 99499 UNLISTED E&M SERVICE: CPT | Mod: S$GLB,,, | Performed by: INTERNAL MEDICINE

## 2017-02-20 PROCEDURE — 87040 BLOOD CULTURE FOR BACTERIA: CPT

## 2017-02-20 PROCEDURE — 3074F SYST BP LT 130 MM HG: CPT | Mod: S$GLB,,, | Performed by: INTERNAL MEDICINE

## 2017-02-20 PROCEDURE — 87070 CULTURE OTHR SPECIMN AEROBIC: CPT

## 2017-02-20 PROCEDURE — 3078F DIAST BP <80 MM HG: CPT | Mod: S$GLB,,, | Performed by: INTERNAL MEDICINE

## 2017-02-20 PROCEDURE — 86140 C-REACTIVE PROTEIN: CPT

## 2017-02-20 RX ORDER — HYDROXYZINE HYDROCHLORIDE 25 MG/1
25 TABLET, FILM COATED ORAL 3 TIMES DAILY PRN
Qty: 42 TABLET | Refills: 0 | Status: SHIPPED | OUTPATIENT
Start: 2017-02-20 | End: 2017-03-14
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
## 2017-02-20 NOTE — TELEPHONE ENCOUNTER
Spoke with pt's wife.   States pt was seen today by ID.   They are caring for fever and wound issues at this time.    Wife will call me if she should need anything in the future

## 2017-02-20 NOTE — PROGRESS NOTES
LABS AND ONE SET OF BLOOD CULTURES DRAWN FROM RIGHT PICC LINE.  SECOND SET OF BLOOD CULTURES DRAWN FROM LEFT AC.  PICC LINE THEN REMOVED AND TIP CULTURED.  PT TOLERATED WELL.  INSTRUCTED PT ON SITE CARE OVER THE NEXT COUPLE OF DAYS.  PT TOLERATED WELL AND LEFT IN NAD.

## 2017-02-20 NOTE — TELEPHONE ENCOUNTER
Rashblisters on chest, midsection, arms, legs picc line dressing blistered. Rash developing all day.,+ itching.T101.7 fever on/off all day, taking tylenol. [facility] in July 2016. Currently in fixator again. On abx- 4/d q6, cipro. Difficulty getting to WC to bed- cant put weight on leg. Sl HA. No stiff neck , eating ok today. Denies dizziness, dry mouth. Good uop. takes fluid pills. Spoke with MD on call -hold abx. rec tylenol and bendryl - if rash not better needs to go to ED this evening. rec follow up with ID in am. Seeing PA of Dr Jasiel mckinney - pt is getting weaker. HH RN comes M/W/F re wound/PICC. is not getting PT as pt pulled his back. Call back with questions.      Reason for Disposition   [1] Fever > 101 F (38.3 C) AND [2] age > 60    Protocols used:  FEVER-A-

## 2017-02-20 NOTE — TELEPHONE ENCOUNTER
----- Message from Hollie Peterson sent at 2/20/2017  8:05 AM CST -----  Contact: spouse - Bri  Patient was not feeling well this weekend , He went to the ER . Now he has a rash . She is requesting an appt for today . I offered walk in , and other locations . Patient's wife refused & stated they are calling infectious disease doctor .    [phone]   LL

## 2017-02-20 NOTE — TELEPHONE ENCOUNTER
Spoke w/patient's wife this morning, states patient has appt with infectious disease doctor this morning 2/20.

## 2017-02-20 NOTE — TELEPHONE ENCOUNTER
----- Message from Alyson Son sent at 2/20/2017  1:13 PM CST -----  Contact: Pt's wife  Pt's wife is returning call and can be reached at [phone].  Thank you

## 2017-02-21 ENCOUNTER — OFFICE VISIT (OUTPATIENT)
Dept: FAMILY MEDICINE | Facility: CLINIC | Age: 72
End: 2017-02-21
Payer: MEDICARE

## 2017-02-21 ENCOUNTER — TELEPHONE (OUTPATIENT)
Dept: ORTHOPEDICS | Facility: CLINIC | Age: 72
End: 2017-02-21

## 2017-02-21 VITALS
RESPIRATION RATE: 20 BRPM | DIASTOLIC BLOOD PRESSURE: 84 MMHG | SYSTOLIC BLOOD PRESSURE: 118 MMHG | HEART RATE: 57 BPM | OXYGEN SATURATION: 94 % | TEMPERATURE: 98 F

## 2017-02-21 DIAGNOSIS — Z00.00 ENCOUNTER FOR PREVENTIVE HEALTH EXAMINATION: Primary | ICD-10-CM

## 2017-02-21 DIAGNOSIS — S82.202N TRAUMATIC TYPE III OPEN FRACTURE OF SHAFT OF LEFT TIBIA AND FIBULA WITH NONUNION: ICD-10-CM

## 2017-02-21 DIAGNOSIS — I10 ESSENTIAL HYPERTENSION: ICD-10-CM

## 2017-02-21 DIAGNOSIS — S82.402N TRAUMATIC TYPE III OPEN FRACTURE OF SHAFT OF LEFT TIBIA AND FIBULA WITH NONUNION: ICD-10-CM

## 2017-02-21 DIAGNOSIS — C61 PROSTATE CANCER: ICD-10-CM

## 2017-02-21 DIAGNOSIS — E66.01 MORBID OBESITY WITH BMI OF 40.0-44.9, ADULT: ICD-10-CM

## 2017-02-21 DIAGNOSIS — E55.9 VITAMIN D DEFICIENCY DISEASE: ICD-10-CM

## 2017-02-21 DIAGNOSIS — E78.5 HYPERLIPIDEMIA, UNSPECIFIED HYPERLIPIDEMIA TYPE: ICD-10-CM

## 2017-02-21 DIAGNOSIS — F33.0 MAJOR DEPRESSIVE DISORDER, RECURRENT EPISODE, MILD: ICD-10-CM

## 2017-02-21 DIAGNOSIS — I48.0 PAROXYSMAL ATRIAL FIBRILLATION: ICD-10-CM

## 2017-02-21 DIAGNOSIS — L97.529 NON-PRESSURE CHRONIC ULCER OF OTHER PART OF LEFT FOOT WITH UNSPECIFIED SEVERITY: ICD-10-CM

## 2017-02-21 DIAGNOSIS — G47.33 OBSTRUCTIVE SLEEP APNEA ON CPAP: ICD-10-CM

## 2017-02-21 DIAGNOSIS — I25.10 CORONARY ARTERY DISEASE INVOLVING NATIVE CORONARY ARTERY OF NATIVE HEART WITHOUT ANGINA PECTORIS: ICD-10-CM

## 2017-02-21 DIAGNOSIS — K21.9 GASTROESOPHAGEAL REFLUX DISEASE, ESOPHAGITIS PRESENCE NOT SPECIFIED: ICD-10-CM

## 2017-02-21 DIAGNOSIS — R09.89 PROMINENT AORTA: ICD-10-CM

## 2017-02-21 PROBLEM — L97.522 CHRONIC ULCER OF LEFT FOOT WITH FAT LAYER EXPOSED: Status: ACTIVE | Noted: 2017-02-21

## 2017-02-21 PROCEDURE — 3074F SYST BP LT 130 MM HG: CPT | Mod: S$GLB,,, | Performed by: NURSE PRACTITIONER

## 2017-02-21 PROCEDURE — 99499 UNLISTED E&M SERVICE: CPT | Mod: S$GLB,,, | Performed by: NURSE PRACTITIONER

## 2017-02-21 PROCEDURE — 3079F DIAST BP 80-89 MM HG: CPT | Mod: S$GLB,,, | Performed by: NURSE PRACTITIONER

## 2017-02-21 PROCEDURE — 99999 PR PBB SHADOW E&M-EST. PATIENT-LVL IV: CPT | Mod: PBBFAC,,, | Performed by: NURSE PRACTITIONER

## 2017-02-21 PROCEDURE — G0439 PPPS, SUBSEQ VISIT: HCPCS | Mod: S$GLB,,, | Performed by: NURSE PRACTITIONER

## 2017-02-21 NOTE — TELEPHONE ENCOUNTER
----- Message from Cy Kirk sent at 2/21/2017 11:02 AM CST -----  Contact: Ms. Montgomery/wife  Ms. Montgomery would like to speak with you regarding a note that the pt needs. Ms. Montgomery can be reached at [phone].

## 2017-02-21 NOTE — TELEPHONE ENCOUNTER
"    Reason for Disposition   [1] Dressing needs to be changed (e.g., wet, soiled, loose, or open to air) AND [2] know how to perform dressing change     Coban too tight; replaced with ACE and new sterile gauze.    Answer Assessment - Initial Assessment Questions  1. SYMPTOM:  "What's the main symptom you're concerned about?" (e.g., pain, redness, swelling, pus)      Swelling, and squeezing on my right arm where the picc line was removed and the narrow coban was applied.    2. ONSET: "When did the ________  start?"      I noticed the pain for the last hour ago.    3. IV WHAT: "What kind of IV line do you have?" (e.g., central line, PICC, peripheral IV)      PICC line.    4. IV WHERE: "Where does the IV enter your body?"      Right arm, insertion site was about 2" above the bend of my elbow.    5. IV WHEN: "When was this IV put in?"      It has been in since January.     6. IV WHY: "Why do you have this IV line?"      Antibiotic infusions, by infectious disease.    7. IV RUNNING OK: "Is the IV running OK?" (e.g., running OK, running slowly, not running, unable to flush)       It was removed today, this morning.    8. PAIN: "Is there any pain?" If so, ask: "How bad is the pain?"   (e.g., scale 1-10; or mild, moderate, severe)      Yes. The coban is so tightly wrapped that my arm is bulging above it, and is red.    9. OTHER SYMPTOMS: "Do you have any other symptoms?" (e.g., fever, shaking chills, new weakness, pus)      No fever today.    10. VISITING NURSE: "Do you have a visiting nurse?" (e.g., home health nurse, IV infusion nurse)        Yes, HH nurse was here today. But she came as soon as we got home from the clinic today, so it was not swollen and painful then.    Protocols used: ST IV SITE (SKIN) [id number]-A-[facility] called to say his PICC line removal site (removed today) on his right arm, above antecubital, is having the circulation cut off due to the very narrow, very tight coban that it was wrapped with in " infusion center today when PICC removed.  His arm is puffy above the tight coban; he was told to leave it on for 24 hours.  His wife does have new roll of ACE wrap, and sterile gauze.  Janusz held the gauze at PICC site tight while she cut off the coban, and applied the ACE securely over the PICC site.  At no time was pressure released.  He has no bleeding.  He is in bed.  Explained the need to have his arm elevated above the level of his heart until the swelling is relieved.  Also told her to call back for any additional or new concerns.  Message to Dr Perry. Please contact caller directly with any additional care advice.

## 2017-02-21 NOTE — PROGRESS NOTES
Janusz Montgomery presented for a  Medicare AWV and comprehensive Health Risk Assessment today. The following components were reviewed and updated:    · Medical history  · Family History  · Social history  · Allergies and Current Medications  · Health Risk Assessment  · Health Maintenance  · Care Team     ** See Completed Assessments for Annual Wellness Visit within the encounter summary.**       The following assessments were completed:  · Living Situation  · CAGE  · Depression Screening  · Timed Get Up and Go  · Whisper Test  · Cognitive Function Screening  · Nutrition Screening  · ADL Screening  · PAQ Screening    Vitals:    02/21/17 1442   BP: 118/84   BP Location: Left arm   Patient Position: Sitting   BP Method: Manual   Pulse: (!) 57   Resp: 20   Temp: 97.9 °F (36.6 °C)   TempSrc: Oral   SpO2: (!) 94%     There is no height or weight on file to calculate BMI.  Physical Exam   Constitutional: He is oriented to person, place, and time.   Pulmonary/Chest: Effort normal.   Musculoskeletal: He exhibits edema.   Feet:   Left Foot:   Skin Integrity: Positive for ulcer (adhesive bandage secured to site. ) and skin breakdown.   Neurological: He is alert and oriented to person, place, and time.   Skin: Skin is warm.   Psychiatric: He has a normal mood and affect. His behavior is normal. Thought content normal.   Vitals reviewed.        Diagnoses and health risks identified today and associated recommendations/orders:    1. Encounter for preventive health examination  Education provided about preventive health examinations and procedures; discussed patient's health concerns, holistically addressed patient's health plan.  Reviewed medical record per discussion with patient for health risks, which are addressed in this documentation.     2. Major depressive disorder, recurrent episode, mild  Stable, symptoms controlled. Denies homicidal and suicidal ideation; Continuing current management.    3. Paroxysmal atrial fibrillation  - new onset after trauma  Stable, followed by [facility] cardiology, taking warfarin as directed, denies worsening symptoms; continue as directed.     4. Prostate cancer  Stable, followed by [facility] urology, taking tamsulosin as directed, denies worsening symptoms; continue as directed.     5. Prominent aorta  Stable, asymptomatic on ASA, STATIN, and antihypertensives with good control; monitor    6. Morbid obesity with BMI of 40.0-44.9, adult  Patient has limitations at this time due to left foot ulcer; reminded patient of [employer]'s Silver Sneakers benefits with access to fitness centers and classes.     7. 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     8. Non-pressure chronic ulcer of other part of left foot with unspecified severity  Stable, followed by [facility] orthopedics and infectious disease; continue as directed.     9. Vitamin D deficiency disease  Stable, taking vitamin D supplements.     10. Gastroesophageal reflux disease, esophagitis presence not specified  Symptoms controlled. Taking omeprazole as directed, advised about trigger foods, long-term PPI usage. ontinuing current management.    11. Obstructive sleep apnea on CPAP  Symptoms controlled; patient using CPAP as directed. Continuing current management.    12. Coronary artery disease involving native coronary artery of native heart without angina pectoris s/p RCA stent  Stable, followed by [facility] cardiology, taking ASA, warfarin, atorvastatin as directed, denies worsening symptoms; continue as directed.     13. Hyperlipidemia, unspecified hyperlipidemia type  Lipid panel (April 2016) reflects within acceptable range.   The current medical regimen is effective;  continue present plan and medications.    14. Essential hypertension  The current medical regimen is effective;  continue present plan and medications.      No Follow-up on file.    Quan WEST  Ramo Morris, NP

## 2017-02-21 NOTE — PATIENT INSTRUCTIONS
Counseling and Referral of Other Preventative  (Italic type indicates deductible and co-insurance are waived)    Patient Name: Janusz Montgomery  Today's Date: 2/21/2017      SERVICE LIMITATIONS RECOMMENDATION    Vaccines    · Pneumococcal (once after 65)    · Influenza (annually)    · Hepatitis B (if medium/high risk)    · Prevnar 13      Hepatitis B medium/high risk factors:       - End-stage renal disease       - Hemophiliacs who received Factor VII or         IX concentrates       - Clients of institutions for the mentally             retarded       - Persons who live in the same house as          a HepB carrier       - Homosexual men       - Illicit injectable drug abusers     Pneumococcal: Done, no repeat necessary     Influenza: Done, repeat in one year     Hepatitis B: N/A no clinical risk factors      Prevnar 13: Done, repeat at next scheduled date    Prostate cancer screening (annually to age 75)     Prostate specific antigen (PSA) Shared decision making with Provider. Sometimes a co-pay may be required if the patient decides to have this test. The USPSTF no longer recommends prostate cancer screening routinely in medicine: every 1 year completed: December 2016    Colorectal cancer screening (to age 75)    · Fecal occult blood test (annual)  · Flexible sigmoidoscopy (5y)  · Screening colonoscopy (10y)  · Barium enema   Last done 6/2007, recommend to repeat every 10  years    Diabetes self-management training (no USPSTF recommendations)  Requires referral by treating physician for patient with diabetes or renal disease. 10 hours of initial DSMT sessions of no less than 30 minutes each in a continuous 12-month period. 2 hours of follow-up DSMT in subsequent years.  N/A non-diabetic; no clinical risk factors, Ha1c 7/2016    Glaucoma screening (no USPSTF recommendation)  Diabetes mellitus, family history   , age 50 or over    American, age 65 or over  completed May 2016    Medical  nutrition therapy for diabetes or renal disease (no recommended schedule)  Requires referral by treating physician for patient with diabetes or renal disease or kidney transplant within the past 3 years.  Can be provided in same year as diabetes self-management training (DSMT), and CMS recommends medical nutrition therapy take place after DSMT. Up to 3 hours for initial year and 2 hours in subsequent years.  N/A no clinical risk factors     Cardiovascular screening blood tests (every 5 years)  · Fasting lipid panel  Order as a panel if possible  Last done 2/2017, recommend to repeat every 5  years    Diabetes screening tests (at least every 3 years, Medicare covers annually or at 6-month intervals for prediabetic patients)  · Fasting blood sugar (FBS) or glucose tolerance test (GTT)  Patient must be diagnosed with one of the following:       - Hypertension       - Dyslipidemia       - Obesity (BMI 30kg/m2)       - Previous elevated impaired FBS or GTT       ... or any two of the following:       - Overweight (BMI 25 but <30)       - Family history of diabetes       - Age 65 or older       - History of gestational diabetes or birth of baby weighing more than 9 pounds  N/A non-diabetic; no clinical risk factors, Ha1c 7/2016    Abdominal aortic aneurysm screening (once)  · Sonogram   Limited to patients who meet one of the following criteria:       - Men who are 65-75 years old and have smoked more than 100 cigarette in their lifetime       - Anyone with a family history of abdominal aortic aneurysm       - Anyone recommended for screening by the USPSTF  N/A patient had CT of Abdomen 7/2016    HIV screening (annually for increased risk patients)  · HIV-1 and HIV-2 by EIA, or HARRY, rapid antibody test or oral mucosa transudate  Patients must be at increased risk for HIV infection per USPSTF guidelines or pregnant. Tests covered annually for patient at increased risk or as requested by the patient. Pregnant patients may  receive up to 3 tests during pregnancy. no clinical risk factors     Smoking cessation counseling (up to 8 sessions per year)  Patients must be asymptomatic of tobacco-related conditions to receive as a preventative service.  non-tobacco user    Subsequent annual wellness visit  At least 12 months since last AWV  Return in one year     The following information is provided to all patients.  This information is to help you find resources for any of the problems found today that may be affecting your health:                Living healthy guide: www.[facility].[place].[facility]      Understanding Diabetes: www.diabetes.org      Eating healthy: www.cdc.gov/healthyweight      CDC home safety checklist: www.cdc.gov/steadi/patient.html      Agency on Aging: www.goea.[place].[facility]      Alcoholics anonymous (AA): www.aa.org      Physical Activity: www.fallon.nih.gov/yv6zcfu      Tobacco use: www.quitwithusla.org

## 2017-02-21 NOTE — TELEPHONE ENCOUNTER
Spoke with pt's wife.   States that she needs a letter stating that pt continues to need the use of a wheelchair at this time.

## 2017-02-22 DIAGNOSIS — S82.202N TRAUMATIC TYPE III OPEN FRACTURE OF SHAFT OF LEFT TIBIA AND FIBULA WITH NONUNION: Primary | ICD-10-CM

## 2017-02-22 DIAGNOSIS — S82.402N TRAUMATIC TYPE III OPEN FRACTURE OF SHAFT OF LEFT TIBIA AND FIBULA WITH NONUNION: Primary | ICD-10-CM

## 2017-02-22 LAB — BACTERIA CATH TIP CULT: NO GROWTH

## 2017-02-22 NOTE — PROGRESS NOTES
71 year old male with left tibial shaft non-union s/p external fixator placement on 12/28/16. Has heel wound. Due to wound and fracture he is not placing weight on the extremity.  Patient would benefit form continued use of a wheelchair.

## 2017-02-23 ENCOUNTER — TELEPHONE (OUTPATIENT)
Dept: ORTHOPEDICS | Facility: CLINIC | Age: 72
End: 2017-02-23

## 2017-02-23 LAB
BACTERIA BLD CULT: NORMAL
BACTERIA BLD CULT: NORMAL

## 2017-02-23 NOTE — TELEPHONE ENCOUNTER
----- Message from Silvia Leroy PA-C sent at 2/23/2017  7:11 AM CST -----  Contact: johnie  / [facility]   Done, papers on your desk.     ----- Message -----     From: Johnie Pruitt MA     Sent: 2/22/2017   2:32 PM       To: Silvia Leroy PA-C    Silvia  Pt needs new order for wheelchair with length of need being 99 months.    Also send clinic notes with order stating why pt still needs wheelchair.   Original order can from [facility] MD who only wrote order for 6 months.   Fax to Johnie at [facility] at  [phone]  Thanks  Kezia    ----- Message -----     From: Lulú Dai MA     Sent: 2/22/2017   2:10 PM       To: Milana BURT Staff    Pt needs new prescription for wheel chair original was for 6 month. Requesting a call back. Johnie can be reached at  [phone] ext [phone].

## 2017-02-24 ENCOUNTER — HOSPITAL ENCOUNTER (INPATIENT)
Facility: HOSPITAL | Age: 72
LOS: 3 days | Discharge: HOME HEALTH | DRG: 864 | End: 2017-03-03
Attending: EMERGENCY MEDICINE | Admitting: HOSPITALIST
Payer: MEDICARE

## 2017-02-24 ENCOUNTER — DOCUMENTATION ONLY (OUTPATIENT)
Dept: ORTHOPEDICS | Facility: CLINIC | Age: 72
End: 2017-02-24

## 2017-02-24 ENCOUNTER — TELEPHONE (OUTPATIENT)
Dept: ORTHOPEDICS | Facility: CLINIC | Age: 72
End: 2017-02-24

## 2017-02-24 DIAGNOSIS — L97.509 FOOT ULCER, UNSPECIFIED LATERALITY, WITH UNSPECIFIED SEVERITY: ICD-10-CM

## 2017-02-24 DIAGNOSIS — S82.402N TRAUMATIC TYPE III OPEN FRACTURE OF SHAFT OF LEFT TIBIA AND FIBULA WITH NONUNION: Primary | ICD-10-CM

## 2017-02-24 DIAGNOSIS — I10 ESSENTIAL HYPERTENSION: ICD-10-CM

## 2017-02-24 DIAGNOSIS — S82.202N TRAUMATIC TYPE III OPEN FRACTURE OF SHAFT OF LEFT TIBIA AND FIBULA WITH NONUNION: Primary | ICD-10-CM

## 2017-02-24 DIAGNOSIS — E78.5 HYPERLIPIDEMIA, UNSPECIFIED HYPERLIPIDEMIA TYPE: ICD-10-CM

## 2017-02-24 DIAGNOSIS — R50.9 FEVER: ICD-10-CM

## 2017-02-24 DIAGNOSIS — L97.509 FOOT ULCER: ICD-10-CM

## 2017-02-24 DIAGNOSIS — R50.9 FEVER, UNSPECIFIED FEVER CAUSE: ICD-10-CM

## 2017-02-24 LAB
ALBUMIN SERPL BCP-MCNC: 3.1 G/DL
ALP SERPL-CCNC: 108 U/L
ALT SERPL W/O P-5'-P-CCNC: 40 U/L
ANION GAP SERPL CALC-SCNC: 8 MMOL/L
AST SERPL-CCNC: 28 U/L
BASOPHILS # BLD AUTO: 0.06 K/UL
BASOPHILS NFR BLD: 0.7 %
BILIRUB SERPL-MCNC: 0.6 MG/DL
BILIRUB UR QL STRIP: NEGATIVE
BUN SERPL-MCNC: 20 MG/DL
CALCIUM SERPL-MCNC: 8.7 MG/DL
CHLORIDE SERPL-SCNC: 103 MMOL/L
CLARITY UR REFRACT.AUTO: CLEAR
CO2 SERPL-SCNC: 27 MMOL/L
COLOR UR AUTO: YELLOW
CREAT SERPL-MCNC: 1.2 MG/DL
CRP SERPL-MCNC: 37.1 MG/L
DIFFERENTIAL METHOD: ABNORMAL
EOSINOPHIL # BLD AUTO: 0.6 K/UL
EOSINOPHIL NFR BLD: 6.9 %
ERYTHROCYTE [DISTWIDTH] IN BLOOD BY AUTOMATED COUNT: 13.7 %
ERYTHROCYTE [SEDIMENTATION RATE] IN BLOOD BY WESTERGREN METHOD: 81 MM/HR
EST. GFR  (AFRICAN AMERICAN): >60 ML/MIN/1.73 M^2
EST. GFR  (NON AFRICAN AMERICAN): >60 ML/MIN/1.73 M^2
FLUAV AG SPEC QL IA: NEGATIVE
FLUBV AG SPEC QL IA: NEGATIVE
GLUCOSE SERPL-MCNC: 105 MG/DL
GLUCOSE UR QL STRIP: NEGATIVE
HCT VFR BLD AUTO: 31.8 %
HGB BLD-MCNC: 10.7 G/DL
HGB UR QL STRIP: NEGATIVE
INR PPP: 1
KETONES UR QL STRIP: NEGATIVE
LACTATE SERPL-SCNC: 0.8 MMOL/L
LEUKOCYTE ESTERASE UR QL STRIP: NEGATIVE
LIPASE SERPL-CCNC: 17 U/L
LYMPHOCYTES # BLD AUTO: 3.3 K/UL
LYMPHOCYTES NFR BLD: 35.9 %
MAGNESIUM SERPL-MCNC: 2 MG/DL
MCH RBC QN AUTO: 31.6 PG
MCHC RBC AUTO-ENTMCNC: 33.6 %
MCV RBC AUTO: 94 FL
MONOCYTES # BLD AUTO: 0.6 K/UL
MONOCYTES NFR BLD: 6.3 %
NEUTROPHILS # BLD AUTO: 4.6 K/UL
NEUTROPHILS NFR BLD: 50 %
NITRITE UR QL STRIP: NEGATIVE
PH UR STRIP: 6 [PH] (ref 5–8)
PHOSPHATE SERPL-MCNC: 2.6 MG/DL
PLATELET # BLD AUTO: 280 K/UL
PMV BLD AUTO: 9.5 FL
POTASSIUM SERPL-SCNC: 3.9 MMOL/L
PROCALCITONIN SERPL IA-MCNC: <0.09 NG/ML
PROT SERPL-MCNC: 7.1 G/DL
PROT UR QL STRIP: NEGATIVE
PROTHROMBIN TIME: 10.5 SEC
RBC # BLD AUTO: 3.39 M/UL
SODIUM SERPL-SCNC: 138 MMOL/L
SP GR UR STRIP: 1.01 (ref 1–1.03)
SPECIMEN SOURCE: NORMAL
URN SPEC COLLECT METH UR: NORMAL
UROBILINOGEN UR STRIP-ACNC: NEGATIVE EU/DL
WBC # BLD AUTO: 9.22 K/UL

## 2017-02-24 PROCEDURE — 84100 ASSAY OF PHOSPHORUS: CPT

## 2017-02-24 PROCEDURE — 25000003 PHARM REV CODE 250: Performed by: EMERGENCY MEDICINE

## 2017-02-24 PROCEDURE — 11000001 HC ACUTE MED/SURG PRIVATE ROOM

## 2017-02-24 PROCEDURE — 87186 SC STD MICRODIL/AGAR DIL: CPT

## 2017-02-24 PROCEDURE — 80053 COMPREHEN METABOLIC PANEL: CPT

## 2017-02-24 PROCEDURE — 87400 INFLUENZA A/B EACH AG IA: CPT

## 2017-02-24 PROCEDURE — 87086 URINE CULTURE/COLONY COUNT: CPT

## 2017-02-24 PROCEDURE — 84145 PROCALCITONIN (PCT): CPT

## 2017-02-24 PROCEDURE — 85025 COMPLETE CBC W/AUTO DIFF WBC: CPT

## 2017-02-24 PROCEDURE — 83735 ASSAY OF MAGNESIUM: CPT

## 2017-02-24 PROCEDURE — 87077 CULTURE AEROBIC IDENTIFY: CPT

## 2017-02-24 PROCEDURE — 83605 ASSAY OF LACTIC ACID: CPT

## 2017-02-24 PROCEDURE — 87040 BLOOD CULTURE FOR BACTERIA: CPT

## 2017-02-24 PROCEDURE — 85610 PROTHROMBIN TIME: CPT

## 2017-02-24 PROCEDURE — 25000003 PHARM REV CODE 250: Performed by: INTERNAL MEDICINE

## 2017-02-24 PROCEDURE — 93010 ELECTROCARDIOGRAM REPORT: CPT | Mod: ,,, | Performed by: INTERNAL MEDICINE

## 2017-02-24 PROCEDURE — 83690 ASSAY OF LIPASE: CPT

## 2017-02-24 PROCEDURE — G0378 HOSPITAL OBSERVATION PER HR: HCPCS

## 2017-02-24 PROCEDURE — 87088 URINE BACTERIA CULTURE: CPT

## 2017-02-24 PROCEDURE — 99285 EMERGENCY DEPT VISIT HI MDM: CPT | Mod: ,,, | Performed by: EMERGENCY MEDICINE

## 2017-02-24 PROCEDURE — 63600175 PHARM REV CODE 636 W HCPCS: Performed by: INTERNAL MEDICINE

## 2017-02-24 PROCEDURE — 81003 URINALYSIS AUTO W/O SCOPE: CPT

## 2017-02-24 PROCEDURE — 86140 C-REACTIVE PROTEIN: CPT

## 2017-02-24 PROCEDURE — 85651 RBC SED RATE NONAUTOMATED: CPT

## 2017-02-24 PROCEDURE — 93005 ELECTROCARDIOGRAM TRACING: CPT

## 2017-02-24 PROCEDURE — 99285 EMERGENCY DEPT VISIT HI MDM: CPT | Mod: 25

## 2017-02-24 RX ORDER — ENOXAPARIN SODIUM 100 MG/ML
40 INJECTION SUBCUTANEOUS EVERY 24 HOURS
Status: DISCONTINUED | OUTPATIENT
Start: 2017-02-25 | End: 2017-03-03 | Stop reason: HOSPADM

## 2017-02-24 RX ORDER — ASPIRIN 325 MG
325 TABLET ORAL 2 TIMES DAILY
Status: DISCONTINUED | OUTPATIENT
Start: 2017-02-24 | End: 2017-03-03 | Stop reason: HOSPADM

## 2017-02-24 RX ORDER — AMOXICILLIN 250 MG
1 CAPSULE ORAL 2 TIMES DAILY
Status: DISCONTINUED | OUTPATIENT
Start: 2017-02-24 | End: 2017-03-03 | Stop reason: HOSPADM

## 2017-02-24 RX ORDER — SODIUM CHLORIDE 0.9 % (FLUSH) 0.9 %
3 SYRINGE (ML) INJECTION EVERY 8 HOURS
Status: DISCONTINUED | OUTPATIENT
Start: 2017-02-24 | End: 2017-03-03 | Stop reason: HOSPADM

## 2017-02-24 RX ORDER — DICLOFENAC SODIUM 10 MG/G
2 GEL TOPICAL DAILY
Status: DISCONTINUED | OUTPATIENT
Start: 2017-02-25 | End: 2017-03-03 | Stop reason: HOSPADM

## 2017-02-24 RX ORDER — ONDANSETRON 2 MG/ML
4 INJECTION INTRAMUSCULAR; INTRAVENOUS EVERY 12 HOURS PRN
Status: DISCONTINUED | OUTPATIENT
Start: 2017-02-24 | End: 2017-03-03 | Stop reason: HOSPADM

## 2017-02-24 RX ORDER — PANTOPRAZOLE SODIUM 40 MG/1
40 TABLET, DELAYED RELEASE ORAL DAILY
Status: DISCONTINUED | OUTPATIENT
Start: 2017-02-25 | End: 2017-03-03 | Stop reason: HOSPADM

## 2017-02-24 RX ORDER — RAMIPRIL 5 MG/1
5 CAPSULE ORAL DAILY
Status: DISCONTINUED | OUTPATIENT
Start: 2017-02-25 | End: 2017-02-28

## 2017-02-24 RX ORDER — OXYCODONE AND ACETAMINOPHEN 10; 325 MG/1; MG/1
1 TABLET ORAL EVERY 6 HOURS PRN
COMMUNITY
End: 2017-03-13

## 2017-02-24 RX ORDER — NITROGLYCERIN 0.4 MG/1
0.4 TABLET SUBLINGUAL EVERY 5 MIN PRN
Status: DISCONTINUED | OUTPATIENT
Start: 2017-02-24 | End: 2017-03-03 | Stop reason: HOSPADM

## 2017-02-24 RX ORDER — METOPROLOL TARTRATE 25 MG/1
25 TABLET, FILM COATED ORAL 2 TIMES DAILY
Status: DISCONTINUED | OUTPATIENT
Start: 2017-02-24 | End: 2017-03-03 | Stop reason: HOSPADM

## 2017-02-24 RX ORDER — CHOLECALCIFEROL (VITAMIN D3) 25 MCG
1000 TABLET ORAL DAILY
Status: DISCONTINUED | OUTPATIENT
Start: 2017-02-25 | End: 2017-03-03 | Stop reason: HOSPADM

## 2017-02-24 RX ORDER — ACETAMINOPHEN 325 MG/1
650 TABLET ORAL EVERY 4 HOURS PRN
Status: DISCONTINUED | OUTPATIENT
Start: 2017-02-24 | End: 2017-03-03 | Stop reason: HOSPADM

## 2017-02-24 RX ORDER — GABAPENTIN 300 MG/1
300 CAPSULE ORAL 3 TIMES DAILY
Status: DISCONTINUED | OUTPATIENT
Start: 2017-02-24 | End: 2017-03-03 | Stop reason: HOSPADM

## 2017-02-24 RX ORDER — TRAMADOL HYDROCHLORIDE 50 MG/1
50 TABLET ORAL EVERY 6 HOURS PRN
Status: DISCONTINUED | OUTPATIENT
Start: 2017-02-24 | End: 2017-03-03 | Stop reason: HOSPADM

## 2017-02-24 RX ORDER — TAMSULOSIN HYDROCHLORIDE 0.4 MG/1
0.4 CAPSULE ORAL DAILY
Status: DISCONTINUED | OUTPATIENT
Start: 2017-02-25 | End: 2017-03-03 | Stop reason: HOSPADM

## 2017-02-24 RX ORDER — ATORVASTATIN CALCIUM 20 MG/1
40 TABLET, FILM COATED ORAL DAILY
Status: DISCONTINUED | OUTPATIENT
Start: 2017-02-25 | End: 2017-03-03 | Stop reason: HOSPADM

## 2017-02-24 RX ORDER — TORSEMIDE 20 MG/1
40 TABLET ORAL DAILY
Status: DISCONTINUED | OUTPATIENT
Start: 2017-02-25 | End: 2017-02-28

## 2017-02-24 RX ORDER — HYDROXYZINE HYDROCHLORIDE 25 MG/1
25 TABLET, FILM COATED ORAL 3 TIMES DAILY PRN
Status: DISCONTINUED | OUTPATIENT
Start: 2017-02-24 | End: 2017-03-03 | Stop reason: HOSPADM

## 2017-02-24 RX ORDER — OXYCODONE AND ACETAMINOPHEN 10; 325 MG/1; MG/1
1 TABLET ORAL EVERY 6 HOURS PRN
Status: DISCONTINUED | OUTPATIENT
Start: 2017-02-24 | End: 2017-03-03 | Stop reason: HOSPADM

## 2017-02-24 RX ORDER — AMOXICILLIN 250 MG
1 CAPSULE ORAL 2 TIMES DAILY
Status: DISCONTINUED | OUTPATIENT
Start: 2017-02-24 | End: 2017-02-24 | Stop reason: SDUPTHER

## 2017-02-24 RX ORDER — VENLAFAXINE 37.5 MG/1
150 TABLET ORAL 2 TIMES DAILY
Status: DISCONTINUED | OUTPATIENT
Start: 2017-02-24 | End: 2017-03-03 | Stop reason: HOSPADM

## 2017-02-24 RX ORDER — ACETAMINOPHEN 500 MG
1000 TABLET ORAL
Status: COMPLETED | OUTPATIENT
Start: 2017-02-24 | End: 2017-02-24

## 2017-02-24 RX ORDER — ACETAMINOPHEN 325 MG/1
650 TABLET ORAL EVERY 4 HOURS PRN
Status: DISCONTINUED | OUTPATIENT
Start: 2017-02-24 | End: 2017-02-24

## 2017-02-24 RX ORDER — FUROSEMIDE 10 MG/ML
20 INJECTION INTRAMUSCULAR; INTRAVENOUS ONCE
Status: COMPLETED | OUTPATIENT
Start: 2017-02-24 | End: 2017-02-24

## 2017-02-24 RX ORDER — ZINC SULFATE 50(220)MG
220 CAPSULE ORAL DAILY
Status: DISCONTINUED | OUTPATIENT
Start: 2017-02-25 | End: 2017-03-03 | Stop reason: HOSPADM

## 2017-02-24 RX ADMIN — GABAPENTIN 300 MG: 300 CAPSULE ORAL at 10:02

## 2017-02-24 RX ADMIN — FUROSEMIDE 20 MG: 10 INJECTION, SOLUTION INTRAMUSCULAR; INTRAVENOUS at 11:02

## 2017-02-24 RX ADMIN — ACETAMINOPHEN 1000 MG: 500 TABLET, FILM COATED ORAL at 06:02

## 2017-02-24 RX ADMIN — Medication 3 ML: at 11:02

## 2017-02-24 RX ADMIN — METOPROLOL TARTRATE 25 MG: 25 TABLET ORAL at 10:02

## 2017-02-24 RX ADMIN — VENLAFAXINE 150 MG: 75 TABLET ORAL at 10:02

## 2017-02-24 RX ADMIN — STANDARDIZED SENNA CONCENTRATE AND DOCUSATE SODIUM 1 TABLET: 8.6; 5 TABLET, FILM COATED ORAL at 10:02

## 2017-02-24 RX ADMIN — ASPIRIN 325 MG ORAL TABLET 325 MG: 325 PILL ORAL at 10:02

## 2017-02-24 NOTE — IP AVS SNAPSHOT
[facility]  [address]  [facility] [address]  Phone: [phone]           Patient Discharge Instructions     Our goal is to set you up for success. This packet includes information on your condition, medications, and your home care. It will help you to care for yourself so you don't get sicker and need to go back to the hospital.     Please ask your nurse if you have any questions.        There are many details to remember when preparing to leave the hospital. Here is what you will need to do:    1. Take your medicine. If you are prescribed medications, review your Medication List in the following pages. You may have new medications to  at the pharmacy and others that you'll need to stop taking. Review the instructions for how and when to take your medications. Talk with your doctor or nurses if you are unsure of what to do.     2. Go to your follow-up appointments. Specific follow-up information is listed in the following pages. Your may be contacted by a transition nurse or clinical provider about future appointments. Be sure we have all of the phone numbers to reach you, if needed. Please contact your provider's office if you are unable to make an appointment.     3. Watch for warning signs. Your doctor or nurse will give you detailed warning signs to watch for and when to call for assistance. These instructions may also include educational information about your condition. If you experience any of warning signs to your health, call your doctor.               [facility]  Unless otherwise directed by your provider, please contact [facility], our nurse care line that is available for 24/7 assistance.     [phone] (toll-free)    Registered nurses in the [facility] provide clinical advisement, health education, appointment booking, and other advisory services.                    ** Verify the list of medication(s) below is accurate and up  to date. Carry this with you in case of emergency. If your medications have changed, please notify your healthcare provider.             Medication List      CONTINUE taking these medications        Additional Info                      artificial tears 0.5 % ophthalmic solution   Commonly known as:  ISOPTO TEARS   Refills:  0   Dose:  1 drop    Instructions:  Place 1 drop into both eyes as needed.     Begin Date    AM    Noon    PM    Bedtime       aspirin 325 MG tablet   Quantity:  60 tablet   Refills:  0   Dose:  325 mg    Last time this was given:  325 mg on 3/3/2017  9:07 AM   Instructions:  Take 1 tablet (325 mg total) by mouth 2 (two) times daily.     Begin Date    AM    Noon    PM    Bedtime       atorvastatin 40 MG tablet   Commonly known as:  LIPITOR   Quantity:  90 tablet   Refills:  0    Last time this was given:  40 mg on 3/3/2017  9:07 AM   Instructions:  TAKE ONE TABLET BY MOUTH ONCE DAILY     Begin Date    AM    Noon    PM    Bedtime       collagenase ointment   Quantity:  60 g   Refills:  0    Last time this was given:  3/3/2017  9:11 AM   Instructions:  Apply topically every Mon, Wed, Fri. Nursing to apply Santyl  nickel thick to wound bed and edges and cover with damp gauze (to activate santyl) daily and cover with telfa island dressing (mepore) over each pressure injury. Santyl has autolytic debridement properties and will promote healing.     Begin Date    AM    Noon    PM    Bedtime       diclofenac sodium 1 % Gel   Quantity:  1 Tube   Refills:  0   Dose:  2 g    Instructions:  Apply 2 g topically once daily. Apply 2 g to left shoulder     Begin Date    AM    Noon    PM    Bedtime       gabapentin 300 MG capsule   Commonly known as:  NEURONTIN   Quantity:  90 capsule   Refills:  11   Dose:  300 mg    Last time this was given:  300 mg on 3/3/2017  2:14 PM   Instructions:  Take 1 capsule (300 mg total) by mouth 3 (three) times daily.     Begin Date    AM    Noon    PM    Bedtime       hydrOXYzine  HCl 25 MG tablet   Commonly known as:  ATARAX   Quantity:  42 tablet   Refills:  0   Dose:  25 mg    Instructions:  Take 1 tablet (25 mg total) by mouth 3 (three) times daily as needed for Itching.     Begin Date    AM    Noon    PM    Bedtime       melatonin 3 mg Tab   Refills:  0   Dose:  6 mg    Instructions:  Take 6 mg by mouth nightly as needed.     Begin Date    AM    Noon    PM    Bedtime       metoprolol tartrate 25 MG tablet   Commonly known as:  LOPRESSOR   Quantity:  180 tablet   Refills:  3   Dose:  25 mg    Last time this was given:  25 mg on 3/3/2017  9:06 AM   Instructions:  Take 1 tablet (25 mg total) by mouth 2 (two) times daily.     Begin Date    AM    Noon    PM    Bedtime       nitroGLYCERIN 0.4 MG SL tablet   Commonly known as:  NITROSTAT   Quantity:  100 tablet   Refills:  0   Dose:  0.4 mg    Instructions:  Place 1 tablet (0.4 mg total) under the tongue every 5 (five) minutes as needed for Chest pain.     Begin Date    AM    Noon    PM    Bedtime       omeprazole 20 MG capsule   Commonly known as:  PRILOSEC   Quantity:  180 capsule   Refills:  0   Dose:  40 mg    Instructions:  Take 2 capsules (40 mg total) by mouth once daily.     Begin Date    AM    Noon    PM    Bedtime       oxycodone-acetaminophen  mg per tablet   Commonly known as:  PERCOCET   Refills:  0   Dose:  1 tablet    Last time this was given:  1 tablet on 3/3/2017  9:04 AM   Instructions:  Take 1 tablet by mouth every 6 (six) hours as needed for Pain.     Begin Date    AM    Noon    PM    Bedtime       polyethylene glycol 17 gram Pwpk   Commonly known as:  GLYCOLAX   Quantity:  30 packet   Refills:  0   Dose:  17 g    Instructions:  Take 17 g by mouth once daily.     Begin Date    AM    Noon    PM    Bedtime       ramipril 5 MG capsule   Commonly known as:  ALTACE   Quantity:  90 capsule   Refills:  0    Last time this was given:  5 mg on 3/3/2017  9:07 AM   Instructions:  TAKE ONE CAPSULE BY MOUTH ONCE DAILY     Begin  Date    AM    Noon    PM    Bedtime       senna-docusate 8.6-50 mg 8.6-50 mg per tablet   Commonly known as:  PERICOLACE   Refills:  0   Dose:  1 tablet    Last time this was given:  1 tablet on 3/3/2017  9:07 AM   Instructions:  Take 1 tablet by mouth 2 (two) times daily.     Begin Date    AM    Noon    PM    Bedtime       tamsulosin 0.4 mg Cp24   Commonly known as:  FLOMAX   Quantity:  90 capsule   Refills:  3   Dose:  0.4 mg    Last time this was given:  0.4 mg on 3/3/2017  9:07 AM   Instructions:  Take 1 capsule (0.4 mg total) by mouth once daily.     Begin Date    AM    Noon    PM    Bedtime       torsemide 20 MG Tab   Commonly known as:  DEMADEX   Quantity:  180 tablet   Refills:  0   Dose:  40 mg    Last time this was given:  40 mg on 3/3/2017  9:05 AM   Instructions:  Take 2 tablets (40 mg total) by mouth once daily. 1 tablet (20 mg) daily for 3 days.     Begin Date    AM    Noon    PM    Bedtime       venlafaxine 75 MG tablet   Commonly known as:  EFFEXOR   Quantity:  360 tablet   Refills:  0   Dose:  150 mg    Last time this was given:  150 mg on 3/3/2017  9:06 AM   Instructions:  Take 2 tablets (150 mg total) by mouth 2 (two) times daily.     Begin Date    AM    Noon    PM    Bedtime       VITAMIN B-12 ORAL   Refills:  0   Dose:  2500 mcg    Last time this was given:  2,500 mcg on 3/3/2017  9:06 AM   Instructions:  Take 2,500 mcg by mouth once daily.     Begin Date    AM    Noon    PM    Bedtime       vitamin D 1000 units Tab   Refills:  0   Dose:  1000 Units    Last time this was given:  1,000 Units on 3/3/2017  9:07 AM   Instructions:  Take 1 tablet (1,000 Units total) by mouth once daily.     Begin Date    AM    Noon    PM    Bedtime       zinc sulfate 220 (50) mg capsule   Commonly known as:  ZINCATE   Refills:  0   Dose:  220 mg    Last time this was given:  220 mg on 3/3/2017  9:07 AM   Instructions:  Take 1 capsule (220 mg total) by mouth once daily.     Begin Date    AM    Noon    PM    Bedtime          STOP taking these medications     acyclovir 800 MG Tab   Commonly known as:  ZOVIRAX       lactobacillus acidophilus & bulgar 100 million cell packet   Commonly known as:  LACTINEX       tramadol 50 mg tablet   Commonly known as:  ULTRAM                  Please bring to all follow up appointments:    1. A copy of your discharge instructions.  2. All medicines you are currently taking in their original bottles.  3. Identification and insurance card.    Please arrive 15 minutes ahead of scheduled appointment time.    Please call 24 hours in advance if you must reschedule your appointment and/or time.        Your Scheduled Appointments     Mar 28, 2017  1:00 PM CDT   Established Patient Visit with KAMAR Fierro - Infectious Diseases ([facility] )    [address]  [facility] [id number] [phone]            Apr 03, 2017  8:00 AM CDT   Fasting Lab with LAB, [facility]    [address] [facility] [place] [address] [phone]            Apr 28, 2017  2:00 PM CDT   Established Patient Visit with Miguelina Weathers MD   [facility])    [address]  [place] [address] [phone]              Follow-up Information     Follow up with Reji Hemphill - Infectious Diseases On 3/28/2017.    Specialty:  Infectious Diseases    Why:  appointment 1:00pm    Contact information:    [address]  [facility] [id number] [phone]    Additional information:    1st Floor - [address]      Referrals     Future Orders    Ambulatory referral to Outpatient Case Management     Questions:    Does the patient have a chronic or uncontrolled disease process?:      Does the patient have a new diagnosis of a catastrophic or life altering illness/treatment?:      Does the patient have any psycho-social issues that may affect their ability to adhere to treatment plan?:      Does patient have any behaviors or circumstances that may  "impede ability to adhere to treatment plan?:      Is patient at risk for admission/readmission?:          Discharge Instructions     Future Orders    Activity as tolerated     Diet general     Questions:    Total calories:      Fat restriction, if any:      Protein restriction, if any:      Na restriction, if any:      Fluid restriction:      Additional restrictions:          Primary Diagnosis     Your primary diagnosis was:  Fever      Admission Information     Date & Time Provider Department CSN    2/24/2017 12:33 PM Lane Cherry MD [facility] [id number]      Care Providers     Provider Role Specialty Primary office phone    Lane Cherry MD Attending Provider Hospitalist [phone]    Lane Cherry MD Team Attending  Hospitalist [phone]    Holley Shirley MD Team Attending  Hospitalist [phone]      Your Vitals Were     BP Pulse Temp Resp Height Weight    130/62 (BP Location: Right arm, Patient Position: Lying, BP Method: Automatic) 63 98.3 °F (36.8 °C) (Oral) 18 6' 1" (1.854 m) 150.1 kg (331 lb)    SpO2 BMI             96% 43.67 kg/m2         Recent Lab Values        7/13/2010 3/18/2011 2/10/2012 7/27/2012 11/18/2013 3/3/2014 3/31/2015 7/11/2016      8:35 AM  8:54 AM  8:56 AM  9:25 AM  3:45 PM 10:52 AM 10:35 AM 11:29 AM    A1C 5.3 5.6 5.5 5.4 5.5 5.6 5.3 5.3    Comment for A1C at 11:29 AM on 7/11/2016:  According to ADA guidelines, hemoglobin A1C <7.0% represents  optimal control in non-pregnant diabetic patients.  Different  metrics may apply to specific populations.   Standards of Medical Care in Diabetes - 2016.  For the purpose of screening for the presence of diabetes:  <5.7%     Consistent with the absence of diabetes  5.7-6.4%  Consistent with increasing risk for diabetes   (prediabetes)  >or=6.5%  Consistent with diabetes  Currently no consensus exists for use of hemoglobin A1C  for diagnosis of diabetes for children.        Allergies as of 3/3/2017        " Reactions    Bacitracin Hives, Itching      Advance Directives     An advance directive is a document which, in the event you are no longer able to make decisions for yourself, tells your healthcare team what kind of treatment you do or do not want to receive, or who you would like to make those decisions for you.  If you do not currently have an advance directive, [facility] encourages you to create one.  For more information call:  [phone]), [phone] ([phone]),  or log on to [email or web address].        Language Assistance Services     ATTENTION: Language assistance services are available, free of charge. Please call [phone].      ATENCIÓN: Si cathy michael, tiene a robison disposición servicios gratuitos de asistencia lingüística. Llame al [phone].     [facility] Ý: N?u b?n nói Ti?ng Vi?t, có các d?ch v? h? tr? ngôn ng? mi?n phí dành cho b?n. G?i s? [phone].         [facility] complies with applicable Federal civil rights laws and does not discriminate on the basis of race, color, national origin, age, disability, or sex.

## 2017-02-24 NOTE — TELEPHONE ENCOUNTER
----- Message from Nelda Steward sent at 2/24/2017 12:30 PM CST -----  Contact: [phone]  Pt is in the ER for a high fevers and he has chills, she would like to speak with johnie.

## 2017-02-24 NOTE — PROGRESS NOTES
Spoke with pt wife; regarding her  left leg and foot. Pt had a fever today 101.6, chills, and drainage. Pt is told to go to ER. Pt wife stated that she will bring her  to emergency here [facility]. Message will be sent to Dr. ROBERTS and infectious disease Dr. Perry.  Mrs. Montgomery verbalized understanding.

## 2017-02-24 NOTE — TELEPHONE ENCOUNTER
Spoke with pt's wife.   Advised that we are aware that pt is in the ED.   Pt will be evaluated and treated accordingly.   Ortho resident will be consulted by ED physician.
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
## 2017-02-24 NOTE — ED NOTES
Patient identifiers verified and correct for Janusz Montgomery Jr.    C/C: fever  APPEARANCE: awake and alert in NAD.  SKIN: skin warm; LLE with external fixator with 6x entry points (no swelling, redness, or copious drainage)  MUSCULOSKELETAL: external fixator to LLE with swelling noted to LLE  RESPIRATORY: no shortness of breath. All breath sounds CTA bilaterally.  CARDIAC: heart tones normal. Regular rate and rhythm; 2+ distal pulses; 3+ LLE and left pedal edema  ABDOMEN: S/ND/NT, normoactive bowel sounds present in all four quadrants. Normal stool pattern. LBM 2/22/17  : voids spontaneously without difficulty. Denies hematuria or dysuria
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
## 2017-02-25 PROBLEM — E87.70 FLUID OVERLOAD: Status: ACTIVE | Noted: 2017-02-25

## 2017-02-25 LAB
ANION GAP SERPL CALC-SCNC: 9 MMOL/L
BACTERIA BLD CULT: NORMAL
BACTERIA BLD CULT: NORMAL
BASOPHILS # BLD AUTO: 0.07 K/UL
BASOPHILS NFR BLD: 0.9 %
BNP SERPL-MCNC: 34 PG/ML
BUN SERPL-MCNC: 18 MG/DL
CALCIUM SERPL-MCNC: 8.9 MG/DL
CHLORIDE SERPL-SCNC: 105 MMOL/L
CO2 SERPL-SCNC: 24 MMOL/L
CREAT SERPL-MCNC: 1.2 MG/DL
DIASTOLIC DYSFUNCTION: NO
DIFFERENTIAL METHOD: ABNORMAL
EOSINOPHIL # BLD AUTO: 0.6 K/UL
EOSINOPHIL NFR BLD: 7.4 %
ERYTHROCYTE [DISTWIDTH] IN BLOOD BY AUTOMATED COUNT: 13.5 %
EST. GFR  (AFRICAN AMERICAN): >60 ML/MIN/1.73 M^2
EST. GFR  (NON AFRICAN AMERICAN): >60 ML/MIN/1.73 M^2
GLUCOSE SERPL-MCNC: 98 MG/DL
HCT VFR BLD AUTO: 33.8 %
HGB BLD-MCNC: 11.4 G/DL
LYMPHOCYTES # BLD AUTO: 2.9 K/UL
LYMPHOCYTES NFR BLD: 36 %
MCH RBC QN AUTO: 31.5 PG
MCHC RBC AUTO-ENTMCNC: 33.7 %
MCV RBC AUTO: 93 FL
MITRAL VALVE MOBILITY: NORMAL
MONOCYTES # BLD AUTO: 0.6 K/UL
MONOCYTES NFR BLD: 6.9 %
NEUTROPHILS # BLD AUTO: 4 K/UL
NEUTROPHILS NFR BLD: 48.6 %
PLATELET # BLD AUTO: 293 K/UL
PMV BLD AUTO: 9.5 FL
POTASSIUM SERPL-SCNC: 4.2 MMOL/L
RBC # BLD AUTO: 3.62 M/UL
RETIRED EF AND QEF - SEE NOTES: 55 (ref 55–65)
SODIUM SERPL-SCNC: 138 MMOL/L
WBC # BLD AUTO: 8.13 K/UL

## 2017-02-25 PROCEDURE — 63600175 PHARM REV CODE 636 W HCPCS: Performed by: INTERNAL MEDICINE

## 2017-02-25 PROCEDURE — 83880 ASSAY OF NATRIURETIC PEPTIDE: CPT

## 2017-02-25 PROCEDURE — 99223 1ST HOSP IP/OBS HIGH 75: CPT | Mod: ,,, | Performed by: INTERNAL MEDICINE

## 2017-02-25 PROCEDURE — 93306 TTE W/DOPPLER COMPLETE: CPT

## 2017-02-25 PROCEDURE — G0378 HOSPITAL OBSERVATION PER HR: HCPCS

## 2017-02-25 PROCEDURE — 36415 COLL VENOUS BLD VENIPUNCTURE: CPT

## 2017-02-25 PROCEDURE — 99225 PR SUBSEQUENT OBSERVATION CARE,LEVEL II: CPT | Mod: ,,, | Performed by: PHYSICIAN ASSISTANT

## 2017-02-25 PROCEDURE — 85025 COMPLETE CBC W/AUTO DIFF WBC: CPT

## 2017-02-25 PROCEDURE — 80048 BASIC METABOLIC PNL TOTAL CA: CPT

## 2017-02-25 PROCEDURE — 93306 TTE W/DOPPLER COMPLETE: CPT | Mod: 26,,, | Performed by: INTERNAL MEDICINE

## 2017-02-25 PROCEDURE — 11000001 HC ACUTE MED/SURG PRIVATE ROOM

## 2017-02-25 PROCEDURE — 25000003 PHARM REV CODE 250: Performed by: INTERNAL MEDICINE

## 2017-02-25 RX ADMIN — STANDARDIZED SENNA CONCENTRATE AND DOCUSATE SODIUM 1 TABLET: 8.6; 5 TABLET, FILM COATED ORAL at 09:02

## 2017-02-25 RX ADMIN — ENOXAPARIN SODIUM 40 MG: 100 INJECTION SUBCUTANEOUS at 04:02

## 2017-02-25 RX ADMIN — OXYCODONE AND ACETAMINOPHEN 1 TABLET: 10; 325 TABLET ORAL at 10:02

## 2017-02-25 RX ADMIN — GABAPENTIN 300 MG: 300 CAPSULE ORAL at 09:02

## 2017-02-25 RX ADMIN — VITAMIN D, TAB 1000IU (100/BT) 1000 UNITS: 25 TAB at 10:02

## 2017-02-25 RX ADMIN — CYANOCOBALAMIN TAB 1000 MCG 2500 MCG: 1000 TAB at 10:02

## 2017-02-25 RX ADMIN — Medication 3 ML: at 10:02

## 2017-02-25 RX ADMIN — Medication 3 ML: at 05:02

## 2017-02-25 RX ADMIN — METOPROLOL TARTRATE 25 MG: 25 TABLET ORAL at 10:02

## 2017-02-25 RX ADMIN — PANTOPRAZOLE SODIUM 40 MG: 40 TABLET, DELAYED RELEASE ORAL at 10:02

## 2017-02-25 RX ADMIN — TAMSULOSIN HYDROCHLORIDE 0.4 MG: 0.4 CAPSULE ORAL at 10:02

## 2017-02-25 RX ADMIN — STANDARDIZED SENNA CONCENTRATE AND DOCUSATE SODIUM 1 TABLET: 8.6; 5 TABLET, FILM COATED ORAL at 10:02

## 2017-02-25 RX ADMIN — OXYCODONE AND ACETAMINOPHEN 1 TABLET: 10; 325 TABLET ORAL at 09:02

## 2017-02-25 RX ADMIN — GABAPENTIN 300 MG: 300 CAPSULE ORAL at 04:02

## 2017-02-25 RX ADMIN — Medication 220 MG: at 10:02

## 2017-02-25 RX ADMIN — ASPIRIN 325 MG ORAL TABLET 325 MG: 325 PILL ORAL at 09:02

## 2017-02-25 RX ADMIN — VENLAFAXINE 150 MG: 75 TABLET ORAL at 10:02

## 2017-02-25 RX ADMIN — RAMIPRIL 5 MG: 5 CAPSULE ORAL at 10:02

## 2017-02-25 RX ADMIN — ASPIRIN 325 MG ORAL TABLET 325 MG: 325 PILL ORAL at 10:02

## 2017-02-25 RX ADMIN — GABAPENTIN 300 MG: 300 CAPSULE ORAL at 05:02

## 2017-02-25 RX ADMIN — VENLAFAXINE 150 MG: 75 TABLET ORAL at 09:02

## 2017-02-25 RX ADMIN — ATORVASTATIN CALCIUM 40 MG: 20 TABLET, FILM COATED ORAL at 10:02

## 2017-02-25 RX ADMIN — METOPROLOL TARTRATE 25 MG: 25 TABLET ORAL at 09:02

## 2017-02-25 RX ADMIN — TORSEMIDE 40 MG: 20 TABLET ORAL at 10:02

## 2017-02-25 NOTE — PLAN OF CARE
Problem: Patient Care Overview  Goal: Plan of Care Review  Outcome: Ongoing (interventions implemented as appropriate)  Fall precautions maintained. Bed in lowest position with wheels locked. Side table and call bell within reach. Lasix given per one time order. Telemetry monitoring maintained. Will continue to monitor.

## 2017-02-25 NOTE — PROVIDER PROGRESS NOTES - EMERGENCY DEPT.
Encounter Date: 2/24/2017    ED Physician Progress Notes       SCRIBE NOTE: I, Susie Falcon, am scribing for, and in the presence of,  Dr. Bucio.  Physician Statement: I, Dr. Bucio, personally performed the services described in this documentation as scribed by Susie Falcon in my presence, and it is both accurate and complete.      EKG - STEMI Decision  Initial Reading: No STEMI present.
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
## 2017-02-26 LAB
ANION GAP SERPL CALC-SCNC: 8 MMOL/L
BASOPHILS # BLD AUTO: 0.08 K/UL
BASOPHILS NFR BLD: 0.8 %
BUN SERPL-MCNC: 20 MG/DL
CALCIUM SERPL-MCNC: 9.1 MG/DL
CHLORIDE SERPL-SCNC: 100 MMOL/L
CO2 SERPL-SCNC: 29 MMOL/L
CREAT SERPL-MCNC: 1.3 MG/DL
DIFFERENTIAL METHOD: ABNORMAL
EOSINOPHIL # BLD AUTO: 0.6 K/UL
EOSINOPHIL NFR BLD: 6.3 %
ERYTHROCYTE [DISTWIDTH] IN BLOOD BY AUTOMATED COUNT: 13.3 %
EST. GFR  (AFRICAN AMERICAN): >60 ML/MIN/1.73 M^2
EST. GFR  (NON AFRICAN AMERICAN): 55.3 ML/MIN/1.73 M^2
GLUCOSE SERPL-MCNC: 96 MG/DL
HCT VFR BLD AUTO: 34.3 %
HGB BLD-MCNC: 11.6 G/DL
LYMPHOCYTES # BLD AUTO: 3.7 K/UL
LYMPHOCYTES NFR BLD: 36.5 %
MCH RBC QN AUTO: 31.7 PG
MCHC RBC AUTO-ENTMCNC: 33.8 %
MCV RBC AUTO: 94 FL
MONOCYTES # BLD AUTO: 0.6 K/UL
MONOCYTES NFR BLD: 6.2 %
NEUTROPHILS # BLD AUTO: 5.1 K/UL
NEUTROPHILS NFR BLD: 50 %
PLATELET # BLD AUTO: 358 K/UL
PMV BLD AUTO: 9.5 FL
POTASSIUM SERPL-SCNC: 4.6 MMOL/L
RBC # BLD AUTO: 3.66 M/UL
SODIUM SERPL-SCNC: 137 MMOL/L
WBC # BLD AUTO: 10.17 K/UL

## 2017-02-26 PROCEDURE — 25000003 PHARM REV CODE 250: Performed by: PHYSICIAN ASSISTANT

## 2017-02-26 PROCEDURE — 11000001 HC ACUTE MED/SURG PRIVATE ROOM

## 2017-02-26 PROCEDURE — 85025 COMPLETE CBC W/AUTO DIFF WBC: CPT

## 2017-02-26 PROCEDURE — 63600175 PHARM REV CODE 636 W HCPCS: Performed by: INTERNAL MEDICINE

## 2017-02-26 PROCEDURE — 25000003 PHARM REV CODE 250: Performed by: INTERNAL MEDICINE

## 2017-02-26 PROCEDURE — G0378 HOSPITAL OBSERVATION PER HR: HCPCS

## 2017-02-26 PROCEDURE — 80048 BASIC METABOLIC PNL TOTAL CA: CPT

## 2017-02-26 PROCEDURE — 36415 COLL VENOUS BLD VENIPUNCTURE: CPT

## 2017-02-26 PROCEDURE — 99225 PR SUBSEQUENT OBSERVATION CARE,LEVEL II: CPT | Mod: ,,, | Performed by: PHYSICIAN ASSISTANT

## 2017-02-26 RX ORDER — SODIUM CHLORIDE 9 MG/ML
INJECTION, SOLUTION INTRAVENOUS CONTINUOUS
Status: ACTIVE | OUTPATIENT
Start: 2017-02-26 | End: 2017-02-26

## 2017-02-26 RX ADMIN — OXYCODONE AND ACETAMINOPHEN 1 TABLET: 10; 325 TABLET ORAL at 05:02

## 2017-02-26 RX ADMIN — VENLAFAXINE 150 MG: 75 TABLET ORAL at 09:02

## 2017-02-26 RX ADMIN — Medication 3 ML: at 10:02

## 2017-02-26 RX ADMIN — ENOXAPARIN SODIUM 40 MG: 100 INJECTION SUBCUTANEOUS at 03:02

## 2017-02-26 RX ADMIN — Medication 3 ML: at 03:02

## 2017-02-26 RX ADMIN — METOPROLOL TARTRATE 25 MG: 25 TABLET ORAL at 09:02

## 2017-02-26 RX ADMIN — Medication 220 MG: at 09:02

## 2017-02-26 RX ADMIN — SODIUM CHLORIDE: 0.9 INJECTION, SOLUTION INTRAVENOUS at 11:02

## 2017-02-26 RX ADMIN — STANDARDIZED SENNA CONCENTRATE AND DOCUSATE SODIUM 1 TABLET: 8.6; 5 TABLET, FILM COATED ORAL at 09:02

## 2017-02-26 RX ADMIN — ASPIRIN 325 MG ORAL TABLET 325 MG: 325 PILL ORAL at 09:02

## 2017-02-26 RX ADMIN — GABAPENTIN 300 MG: 300 CAPSULE ORAL at 03:02

## 2017-02-26 RX ADMIN — TORSEMIDE 40 MG: 20 TABLET ORAL at 09:02

## 2017-02-26 RX ADMIN — GABAPENTIN 300 MG: 300 CAPSULE ORAL at 05:02

## 2017-02-26 RX ADMIN — TAMSULOSIN HYDROCHLORIDE 0.4 MG: 0.4 CAPSULE ORAL at 09:02

## 2017-02-26 RX ADMIN — GABAPENTIN 300 MG: 300 CAPSULE ORAL at 09:02

## 2017-02-26 RX ADMIN — CYANOCOBALAMIN TAB 1000 MCG 2500 MCG: 1000 TAB at 09:02

## 2017-02-26 RX ADMIN — Medication 3 ML: at 05:02

## 2017-02-26 RX ADMIN — RAMIPRIL 5 MG: 5 CAPSULE ORAL at 09:02

## 2017-02-26 RX ADMIN — VITAMIN D, TAB 1000IU (100/BT) 1000 UNITS: 25 TAB at 09:02

## 2017-02-26 RX ADMIN — ATORVASTATIN CALCIUM 40 MG: 20 TABLET, FILM COATED ORAL at 09:02

## 2017-02-26 RX ADMIN — PANTOPRAZOLE SODIUM 40 MG: 40 TABLET, DELAYED RELEASE ORAL at 09:02

## 2017-02-26 NOTE — PROGRESS NOTES
ID Attending  Recent events noted and spoke to Bhavana from primary team:  - Radiology believes patient's significant internal and external fixation hardware will significantly limit MRI and make evaluation for osteomyelitis of very limited utility; radiology recommended 3 phase bone scan vs gallium scan  - Urine culture showing Enterococcus species: I&S pending. But colony count low (10k - 49k), the patient denies urinary symptoms, and UA 2/24 negative  - Past wound culture + for PsA which was resistant to pip/tazo and sensitive to cipro  - Afebrile since early AM 2/25    Recs:  1. Proceed with Gallium scan tomorrow  2. Treatment not indicated for urine cx result, and this is not viable cause of his fevers  3. PsA in wound cx might suggest possibility of more deep-seated infection with this organism, but again would not treat empirically based on past drug eruption with cipro and probably lack of efficacy here anyway with antibiotic therapy alone
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
## 2017-02-26 NOTE — SUBJECTIVE & OBJECTIVE
Interval History: Pt had no acute events overnight. This AM, pt is alert and lying in bed with family at bedside. Pt has no complaints at this time.    Review of Systems   Constitutional: Positive for chills and fever. Negative for diaphoresis and fatigue.   Respiratory: Negative for cough, chest tightness, shortness of breath and wheezing.    Cardiovascular: Negative for chest pain, palpitations and leg swelling.   Gastrointestinal: Negative for abdominal distention, abdominal pain, diarrhea, nausea and vomiting.   Musculoskeletal: Positive for arthralgias, gait problem and myalgias.     Objective:     Vital Signs (Most Recent):  Temp: 99.1 °F (37.3 °C) (02/26/17 0800)  Pulse: 68 (02/26/17 1300)  Resp: 18 (02/26/17 0800)  BP: (!) 152/67 (02/26/17 0800)  SpO2: 96 % (02/26/17 0800) Vital Signs (24h Range):  Temp:  [96.8 °F (36 °C)-100.2 °F (37.9 °C)] 99.1 °F (37.3 °C)  Pulse:  [63-87] 68  Resp:  [18] 18  SpO2:  [95 %-97 %] 96 %  BP: (116-166)/(55-71) 152/67     Weight: (!) 149.7 kg (330 lb)  Body mass index is 43.54 kg/(m^2).    Intake/Output Summary (Last 24 hours) at 02/26/17 1438  Last data filed at 02/25/17 2245   Gross per 24 hour   Intake                0 ml   Output              450 ml   Net             -450 ml      Physical Exam   Constitutional: He is oriented to person, place, and time. He appears well-developed and well-nourished. No distress.   Cardiovascular: Normal rate, regular rhythm, normal heart sounds and intact distal pulses.    No murmur heard.  Pulmonary/Chest: Effort normal and breath sounds normal. No respiratory distress. He has no wheezes.   Abdominal: Soft. Bowel sounds are normal. He exhibits no distension. There is no tenderness.   Neurological: He is alert and oriented to person, place, and time. He has normal reflexes. No cranial nerve deficit.   Skin: He is not diaphoretic.   Nursing note and vitals reviewed.      Significant Labs: All pertinent labs within the past 24 hours have been  reviewed.    Significant Imaging: I have reviewed all pertinent imaging results/findings within the past 24 hours.

## 2017-02-26 NOTE — ASSESSMENT & PLAN NOTE
- CXR shows borderline cardiomegaly, mild edema and pleural fluid; pulmonary edema vs pneumonia  - Xray of L tibia fibula shows TKA in place with good alignment and no complication; side plate which has fractured distally in the area of the tibial fracture and there is an adjacent fibular fracture; internal/external Ilizarov fixation device in place; bones show good alignment.   - Elevated ESR/CRP. Procalcitonin WNL. Blood cx NGTD. Urine cx pending. Tylenol PRN for fever. ID consulted and appreciate recs. Per ortho surg, no concern for left leg as source of fevers and no acute operative intervention.  2/25: Tmax of 100.8F. 2d echo shows EF 55% without signs of pericardial effusion, intracavity mass, thrombi, or vegetation. Per ID, observe off antibiotics for now; verify with Ortho whether fixation device is MRI-compatible (probably not); consider Gallium-67/indium scan with Ortho/Radiology guidance as to best modality; obtain records from [facility] regarding his prostate cancer status/treatment. Per ortho, all fixation devices are MRI compatible. Ordered MRI w/wo of LLE due to concern for hardware infection.

## 2017-02-26 NOTE — PLAN OF CARE
Spoke with Dr. Barlow, the covering physician for Mr. Janusz Montgomery regarding MRI examination ordered to evaluate for osteomyelitis.  MR Montgomery has significant internal and external fixation hardware which will significantly limit the MRI examination and make evaluation for osteomyelitis of very limited utility.  Recommended further discussion with primary team in AM.     Wil Champion  R-3  Pager: [phone]

## 2017-02-26 NOTE — PLAN OF CARE
Problem: Patient Care Overview  Goal: Plan of Care Review  Outcome: Ongoing (interventions implemented as appropriate)  Plan of care reviewed with patient. No distress noted at this time. External fixator in place. Cardiac monitor in place without ectopy. Fall precautions implemented with bed in lowest position, wheels locked, and call bell within reach. Peripheral IV saline locked. Will continue to monitor.
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
## 2017-02-26 NOTE — PLAN OF CARE
Problem: Patient Care Overview  Goal: Plan of Care Review  Outcome: Ongoing (interventions implemented as appropriate)  Fall precautions maintained. Bed in lowest position with wheels locked. Side table and call bell within reach. Lasix given per one time order. Edema decreased to LLE.  Telemetry monitoring maintained. Will continue to monitor.

## 2017-02-26 NOTE — ASSESSMENT & PLAN NOTE
- CXR shows borderline cardiomegaly, mild edema and pleural fluid; pulmonary edema vs pneumonia  - Xray of L tibia fibula shows TKA in place with good alignment and no complication; side plate which has fractured distally in the area of the tibial fracture and there is an adjacent fibular fracture; internal/external Ilizarov fixation device in place; bones show good alignment.   - Elevated ESR/CRP. Procalcitonin WNL. Blood cx NGTD. Urine cx pending. Tylenol PRN for fever. ID consulted and appreciate recs. Per ortho surg, no concern for left leg as source of fevers and no acute operative intervention.  2/25: Tmax of 100.8F. 2d echo shows EF 55% without signs of pericardial effusion, intracavity mass, thrombi, or vegetation. Per ID, observe off antibiotics for now; verify with Ortho whether fixation device is MRI-compatible (probably not); consider Gallium-67/indium scan with Ortho/Radiology guidance as to best modality. Per ortho, all fixation devices are MRI compatible. Ordered MRI w/wo of LLE due to concern for hardware infection.  2/26: Spoke with radiology who believes patient's significant internal and external fixation hardware will significantly limit MRI and make evaluation for osteomyelitis of very limited utility; radiology recommended 3 phase bone scan vs gallium scan. Spoke with ID, ordered Gallium scan of LLE. Urine cultures shows Enterococcus species: I&S pending.

## 2017-02-26 NOTE — SUBJECTIVE & OBJECTIVE
Interval History: Pt had no acute events overnight. This AM, pt sleeping in bed with daughter and wife at bedside. Pt is easily aroused. Pt has no complaints at this time. Pt denies chest pain, SOB, abdominal pain, N/V/D.    Review of Systems   Constitutional: Positive for chills and fever. Negative for diaphoresis and fatigue.   Respiratory: Negative for cough, chest tightness, shortness of breath and wheezing.    Cardiovascular: Negative for chest pain, palpitations and leg swelling.   Gastrointestinal: Negative for abdominal distention, abdominal pain, diarrhea, nausea and vomiting.   Musculoskeletal: Positive for arthralgias, gait problem and myalgias.     Objective:     Vital Signs (Most Recent):  Temp: 99.1 °F (37.3 °C) (02/25/17 1200)  Pulse: 63 (02/25/17 1450)  Resp: 18 (02/25/17 1200)  BP: (!) 117/55 (02/25/17 1200)  SpO2: (!) 94 % (02/25/17 1200) Vital Signs (24h Range):  Temp:  [99.1 °F (37.3 °C)-100.8 °F (38.2 °C)] 99.1 °F (37.3 °C)  Pulse:  [63-86] 63  Resp:  [14-24] 18  SpO2:  [91 %-99 %] 94 %  BP: (117-151)/(54-66) 117/55     Weight: (!) 149.7 kg (330 lb)  Body mass index is 43.54 kg/(m^2).    Intake/Output Summary (Last 24 hours) at 02/25/17 1821  Last data filed at 02/25/17 1100   Gross per 24 hour   Intake                0 ml   Output             3100 ml   Net            -3100 ml      Physical Exam   Constitutional: He is oriented to person, place, and time. He appears well-developed and well-nourished. No distress.   Cardiovascular: Normal rate, regular rhythm, normal heart sounds and intact distal pulses.    No murmur heard.  Pulmonary/Chest: Effort normal and breath sounds normal. No respiratory distress. He has no wheezes.   Abdominal: Soft. Bowel sounds are normal. He exhibits no distension. There is no tenderness.   Neurological: He is alert and oriented to person, place, and time. He has normal reflexes. No cranial nerve deficit.   Skin: He is not diaphoretic.   Nursing note and vitals  reviewed.      Significant Labs: All pertinent labs within the past 24 hours have been reviewed.    Significant Imaging: I have reviewed all pertinent imaging results/findings within the past 24 hours.

## 2017-02-27 ENCOUNTER — OUTPATIENT CASE MANAGEMENT (OUTPATIENT)
Dept: ADMINISTRATIVE | Facility: OTHER | Age: 72
End: 2017-02-27

## 2017-02-27 LAB
ANION GAP SERPL CALC-SCNC: 7 MMOL/L
ANISOCYTOSIS BLD QL SMEAR: SLIGHT
BACTERIA UR CULT: NORMAL
BASOPHILS # BLD AUTO: 0.06 K/UL
BASOPHILS NFR BLD: 0.5 %
BUN SERPL-MCNC: 22 MG/DL
CALCIUM SERPL-MCNC: 9 MG/DL
CHLORIDE SERPL-SCNC: 99 MMOL/L
CO2 SERPL-SCNC: 27 MMOL/L
CREAT SERPL-MCNC: 1.3 MG/DL
DIFFERENTIAL METHOD: ABNORMAL
EOSINOPHIL # BLD AUTO: 0.6 K/UL
EOSINOPHIL NFR BLD: 5.4 %
ERYTHROCYTE [DISTWIDTH] IN BLOOD BY AUTOMATED COUNT: 13.4 %
EST. GFR  (AFRICAN AMERICAN): >60 ML/MIN/1.73 M^2
EST. GFR  (NON AFRICAN AMERICAN): 55.3 ML/MIN/1.73 M^2
GLUCOSE SERPL-MCNC: 110 MG/DL
HCT VFR BLD AUTO: 35.9 %
HGB BLD-MCNC: 12 G/DL
HYPOCHROMIA BLD QL SMEAR: ABNORMAL
LYMPHOCYTES # BLD AUTO: 3.2 K/UL
LYMPHOCYTES NFR BLD: 28 %
MCH RBC QN AUTO: 31.7 PG
MCHC RBC AUTO-ENTMCNC: 33.4 %
MCV RBC AUTO: 95 FL
MONOCYTES # BLD AUTO: 0.7 K/UL
MONOCYTES NFR BLD: 6.1 %
NEUTROPHILS # BLD AUTO: 6.9 K/UL
NEUTROPHILS NFR BLD: 60 %
OSMOLALITY SERPL: 290 MOSM/KG
OSMOLALITY UR: 392 MOSM/KG
OVALOCYTES BLD QL SMEAR: ABNORMAL
PLATELET # BLD AUTO: 368 K/UL
PMV BLD AUTO: 9.6 FL
POIKILOCYTOSIS BLD QL SMEAR: SLIGHT
POLYCHROMASIA BLD QL SMEAR: ABNORMAL
POTASSIUM SERPL-SCNC: 4.1 MMOL/L
PROCALCITONIN SERPL IA-MCNC: <0.09 NG/ML
RBC # BLD AUTO: 3.79 M/UL
SODIUM SERPL-SCNC: 133 MMOL/L
WBC # BLD AUTO: 11.58 K/UL

## 2017-02-27 PROCEDURE — 36415 COLL VENOUS BLD VENIPUNCTURE: CPT

## 2017-02-27 PROCEDURE — 85025 COMPLETE CBC W/AUTO DIFF WBC: CPT

## 2017-02-27 PROCEDURE — 99225 PR SUBSEQUENT OBSERVATION CARE,LEVEL II: CPT | Mod: ,,, | Performed by: PHYSICIAN ASSISTANT

## 2017-02-27 PROCEDURE — 11000001 HC ACUTE MED/SURG PRIVATE ROOM

## 2017-02-27 PROCEDURE — 83935 ASSAY OF URINE OSMOLALITY: CPT

## 2017-02-27 PROCEDURE — 63600175 PHARM REV CODE 636 W HCPCS: Performed by: INTERNAL MEDICINE

## 2017-02-27 PROCEDURE — 84145 PROCALCITONIN (PCT): CPT

## 2017-02-27 PROCEDURE — 25000003 PHARM REV CODE 250: Performed by: INTERNAL MEDICINE

## 2017-02-27 PROCEDURE — G0378 HOSPITAL OBSERVATION PER HR: HCPCS

## 2017-02-27 PROCEDURE — 83930 ASSAY OF BLOOD OSMOLALITY: CPT

## 2017-02-27 PROCEDURE — 99233 SBSQ HOSP IP/OBS HIGH 50: CPT | Mod: ,,, | Performed by: INTERNAL MEDICINE

## 2017-02-27 PROCEDURE — 80048 BASIC METABOLIC PNL TOTAL CA: CPT

## 2017-02-27 RX ADMIN — PANTOPRAZOLE SODIUM 40 MG: 40 TABLET, DELAYED RELEASE ORAL at 08:02

## 2017-02-27 RX ADMIN — OXYCODONE AND ACETAMINOPHEN 1 TABLET: 10; 325 TABLET ORAL at 06:02

## 2017-02-27 RX ADMIN — VENLAFAXINE 150 MG: 75 TABLET ORAL at 08:02

## 2017-02-27 RX ADMIN — Medication 3 ML: at 02:02

## 2017-02-27 RX ADMIN — STANDARDIZED SENNA CONCENTRATE AND DOCUSATE SODIUM 1 TABLET: 8.6; 5 TABLET, FILM COATED ORAL at 08:02

## 2017-02-27 RX ADMIN — ATORVASTATIN CALCIUM 40 MG: 20 TABLET, FILM COATED ORAL at 08:02

## 2017-02-27 RX ADMIN — RAMIPRIL 5 MG: 5 CAPSULE ORAL at 08:02

## 2017-02-27 RX ADMIN — ENOXAPARIN SODIUM 40 MG: 100 INJECTION SUBCUTANEOUS at 12:02

## 2017-02-27 RX ADMIN — VITAMIN D, TAB 1000IU (100/BT) 1000 UNITS: 25 TAB at 08:02

## 2017-02-27 RX ADMIN — METOPROLOL TARTRATE 25 MG: 25 TABLET ORAL at 08:02

## 2017-02-27 RX ADMIN — ASPIRIN 325 MG ORAL TABLET 325 MG: 325 PILL ORAL at 08:02

## 2017-02-27 RX ADMIN — Medication 3 ML: at 10:02

## 2017-02-27 RX ADMIN — TORSEMIDE 40 MG: 20 TABLET ORAL at 08:02

## 2017-02-27 RX ADMIN — TAMSULOSIN HYDROCHLORIDE 0.4 MG: 0.4 CAPSULE ORAL at 08:02

## 2017-02-27 RX ADMIN — Medication 3 ML: at 06:02

## 2017-02-27 RX ADMIN — GABAPENTIN 300 MG: 300 CAPSULE ORAL at 09:02

## 2017-02-27 RX ADMIN — Medication 220 MG: at 08:02

## 2017-02-27 RX ADMIN — GABAPENTIN 300 MG: 300 CAPSULE ORAL at 06:02

## 2017-02-27 RX ADMIN — GABAPENTIN 300 MG: 300 CAPSULE ORAL at 02:02

## 2017-02-27 RX ADMIN — CYANOCOBALAMIN TAB 1000 MCG 2500 MCG: 1000 TAB at 08:02

## 2017-02-27 NOTE — ASSESSMENT & PLAN NOTE
- BNP WNL; one dose of lasix 20 mg IV on admit  - Pt responded well to lasix with huge improvement to LE edema  - Continue torsemide 40 mg po daily as home dose

## 2017-02-27 NOTE — PLAN OF CARE
02/27/17 1448   Discharge Assessment   Assessment Type Discharge Planning Assessment   Assessment information obtained from? Medical Record   Prior to hospitilization cognitive status: Alert/Oriented   Prior to hospitalization functional status: Assistive Equipment   Current cognitive status: Alert/Oriented   Arrived From home or self-care   Lives With spouse   Able to Return to Prior Arrangements yes   Is patient able to care for self after discharge? Yes   Readmission Within The Last 30 Days unable to assess   Patient currently being followed by outpatient case management? Yes   If yes, name of outpatient case management following: [facility] outpatient case management   Patient currently receives home health services? Yes   Patient previously received home health services and would like to resume services if necessary? Yes   If yes, name of home health provider: [facility]   Does the patient currently use HME? Yes   Equipment Currently Used at Home bath bench;bedside commode;walker, rolling;wheelchair   Does the patient have transportation to healthcare appointments? Yes   Transportation Available family or friend will provide   On Dialysis? No   Discharge Plan A Home Health   Discharge Plan B Skilled Nursing Facility   Patient/Family In Agreement With Plan yes   Pt has been to [facility] in recent past and home with Homehealth and IV antibiotics. Currently off unit. Will follow for d/c needs  3/1 Met with patient's daughter and spouse. He received antibiotic course at home with [facility] State they have all needed DME.

## 2017-02-27 NOTE — SUBJECTIVE & OBJECTIVE
Interval History: Pt had no acute events overnight. This AM, pt resting in bed with wife and daughter at bedside. Pt c/o fatigue 2/2 minimal sleep last night. Pt has no other complaints at this time. Pt and family aware of change in imaging modality.    Review of Systems   Constitutional: Positive for chills and fever. Negative for diaphoresis and fatigue.   Respiratory: Negative for cough, chest tightness, shortness of breath and wheezing.    Cardiovascular: Negative for chest pain, palpitations and leg swelling.   Gastrointestinal: Negative for abdominal distention, abdominal pain, diarrhea, nausea and vomiting.   Musculoskeletal: Positive for arthralgias, gait problem and myalgias.     Objective:     Vital Signs (Most Recent):  Temp: 98.7 °F (37.1 °C) (02/27/17 0800)  Pulse: 74 (02/27/17 1100)  Resp: 18 (02/27/17 0800)  BP: (!) 147/65 (02/27/17 0849)  SpO2: (!) 93 % (02/27/17 0800) Vital Signs (24h Range):  Temp:  [98.3 °F (36.8 °C)-99.9 °F (37.7 °C)] 98.7 °F (37.1 °C)  Pulse:  [68-88] 74  Resp:  [18-20] 18  SpO2:  [93 %-99 %] 93 %  BP: (118-157)/(60-66) 147/65     Weight: (!) 150.1 kg (331 lb)  Body mass index is 43.67 kg/(m^2).    Intake/Output Summary (Last 24 hours) at 02/27/17 1246  Last data filed at 02/27/17 0345   Gross per 24 hour   Intake              420 ml   Output             1500 ml   Net            -1080 ml      Physical Exam   Constitutional: He is oriented to person, place, and time. He appears well-developed and well-nourished. No distress.   Cardiovascular: Normal rate, regular rhythm, normal heart sounds and intact distal pulses.    No murmur heard.  Pulmonary/Chest: Effort normal and breath sounds normal. No respiratory distress. He has no wheezes.   Abdominal: Soft. Bowel sounds are normal. He exhibits no distension. There is no tenderness.   Neurological: He is alert and oriented to person, place, and time. He has normal reflexes. No cranial nerve deficit.   Skin: He is not diaphoretic.    Nursing note and vitals reviewed.      Significant Labs: All pertinent labs within the past 24 hours have been reviewed.    Significant Imaging: I have reviewed all pertinent imaging results/findings within the past 24 hours.

## 2017-02-27 NOTE — PLAN OF CARE
Problem: Patient Care Overview  Goal: Plan of Care Review  Outcome: Ongoing (interventions implemented as appropriate)  Pt on fall precautions. Yellow fall risk band and socks on pt. Instructed pt to call for assistance as needed and pt voiced understanding. Family at bedside. Complains of intermittent pain and is being medicated as needed. SR on tele. Afebrile. Up with assist. No WB to left lower extremity

## 2017-02-27 NOTE — ASSESSMENT & PLAN NOTE
- CXR shows borderline cardiomegaly, mild edema and pleural fluid; pulmonary edema vs pneumonia  - Xray of L tibia fibula shows TKA in place with good alignment and no complication; side plate which has fractured distally in the area of the tibial fracture and there is an adjacent fibular fracture; internal/external Ilizarov fixation device in place; bones show good alignment.   - Elevated ESR/CRP. Procalcitonin WNL. Blood cx NGTD. Urine cx pending. Tylenol PRN for fever. ID consulted and appreciate recs. Per ortho surg, no concern for left leg as source of fevers and no acute operative intervention.  2/25: Tmax of 100.8F. 2d echo shows EF 55% without signs of pericardial effusion, intracavity mass, thrombi, or vegetation. Per ID, observe off antibiotics for now; verify with Ortho whether fixation device is MRI-compatible (probably not); consider Gallium-67/indium scan with Ortho/Radiology guidance as to best modality. Per ortho, all fixation devices are MRI compatible. Ordered MRI w/wo of LLE due to concern for hardware infection.  2/26: Spoke with radiology who believes patient's significant internal and external fixation hardware will significantly limit MRI and make evaluation for osteomyelitis of very limited utility; radiology recommended 3 phase bone scan vs gallium scan. Spoke with ID, ordered Gallium scan of LLE. Urine cultures shows Enterococcus species: I&S pending.   2/27: Per ortho surg, indium scan with sulfur colloid ordered 2/2 more definitive to look at distal tibia and heel. Per ID, treatment not indicated for urine cx result and this is not viable cause of his fevers.

## 2017-02-27 NOTE — PROGRESS NOTES
Please note the following patient has been assigned to Delfina Sykes RN CCM,  with Outpatient Complex Care Mgmt for screening.    Please contact [facility] at ext [phone] with questions.    Thank you    Juhi Ashraf, SSC

## 2017-02-27 NOTE — PROGRESS NOTES
Notified JOVI Bateman of being called into pt's room by infectious disease MD and was told that the patient almost fell. Patient had just finished having a bowel movement.   When I arrived into the room patient was sitting up in a wheelchair and diaphoretic and stating that he is feeling weak and lightheaded.  BP 85/48, HR 82 96% on RA.  Assisted patient back into bed.  BP came up to 127/59, HR 73. Patient now asymptomatic. No new orders given. Will continue to monitor.

## 2017-02-27 NOTE — PLAN OF CARE
"Problem: Patient Care Overview  Goal: Plan of Care Review  Outcome: Ongoing (interventions implemented as appropriate)  Safety precautions maintained. Side rails up x2. Bed in lowest position. Call bell within reach. Tele monitoring maintained. Patient medicated for pain PRN as needed. Patient reports fluid to bilateral lower extremities is "much better". Will continue to monitor.       "
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
## 2017-02-28 PROBLEM — E87.5 HYPERKALEMIA: Status: ACTIVE | Noted: 2017-02-28

## 2017-02-28 LAB
ALBUMIN SERPL BCP-MCNC: 3.1 G/DL
ANION GAP SERPL CALC-SCNC: 10 MMOL/L
ANION GAP SERPL CALC-SCNC: 12 MMOL/L
ANISOCYTOSIS BLD QL SMEAR: SLIGHT
BASOPHILS # BLD AUTO: 0.03 K/UL
BASOPHILS NFR BLD: 0.3 %
BUN SERPL-MCNC: 28 MG/DL
BUN SERPL-MCNC: 30 MG/DL
CALCIUM SERPL-MCNC: 9 MG/DL
CALCIUM SERPL-MCNC: 9.3 MG/DL
CHLORIDE SERPL-SCNC: 97 MMOL/L
CHLORIDE SERPL-SCNC: 99 MMOL/L
CO2 SERPL-SCNC: 20 MMOL/L
CO2 SERPL-SCNC: 27 MMOL/L
CREAT SERPL-MCNC: 1.4 MG/DL
CREAT SERPL-MCNC: 1.6 MG/DL
DIFFERENTIAL METHOD: ABNORMAL
EOSINOPHIL # BLD AUTO: 0.8 K/UL
EOSINOPHIL NFR BLD: 7.1 %
ERYTHROCYTE [DISTWIDTH] IN BLOOD BY AUTOMATED COUNT: 13.4 %
EST. GFR  (AFRICAN AMERICAN): 49.7 ML/MIN/1.73 M^2
EST. GFR  (AFRICAN AMERICAN): 58.4 ML/MIN/1.73 M^2
EST. GFR  (NON AFRICAN AMERICAN): 43 ML/MIN/1.73 M^2
EST. GFR  (NON AFRICAN AMERICAN): 50.5 ML/MIN/1.73 M^2
GLUCOSE SERPL-MCNC: 108 MG/DL
GLUCOSE SERPL-MCNC: 95 MG/DL
HCT VFR BLD AUTO: 37.4 %
HGB BLD-MCNC: 12.5 G/DL
LYMPHOCYTES # BLD AUTO: 2.5 K/UL
LYMPHOCYTES NFR BLD: 21.9 %
MCH RBC QN AUTO: 31.3 PG
MCHC RBC AUTO-ENTMCNC: 33.4 %
MCV RBC AUTO: 94 FL
MONOCYTES # BLD AUTO: 0.6 K/UL
MONOCYTES NFR BLD: 5.2 %
NEUTROPHILS # BLD AUTO: 7.5 K/UL
NEUTROPHILS NFR BLD: 65.5 %
PHOSPHATE SERPL-MCNC: 3.6 MG/DL
PLATELET # BLD AUTO: 320 K/UL
PLATELET BLD QL SMEAR: ABNORMAL
PMV BLD AUTO: 10 FL
POTASSIUM SERPL-SCNC: 4.2 MMOL/L
POTASSIUM SERPL-SCNC: 5.5 MMOL/L
RBC # BLD AUTO: 4 M/UL
SODIUM SERPL-SCNC: 131 MMOL/L
SODIUM SERPL-SCNC: 134 MMOL/L
WBC # BLD AUTO: 11.46 K/UL

## 2017-02-28 PROCEDURE — 80048 BASIC METABOLIC PNL TOTAL CA: CPT

## 2017-02-28 PROCEDURE — 80069 RENAL FUNCTION PANEL: CPT

## 2017-02-28 PROCEDURE — 36415 COLL VENOUS BLD VENIPUNCTURE: CPT

## 2017-02-28 PROCEDURE — 85025 COMPLETE CBC W/AUTO DIFF WBC: CPT

## 2017-02-28 PROCEDURE — 11000001 HC ACUTE MED/SURG PRIVATE ROOM

## 2017-02-28 PROCEDURE — 25000003 PHARM REV CODE 250: Performed by: INTERNAL MEDICINE

## 2017-02-28 PROCEDURE — 63600175 PHARM REV CODE 636 W HCPCS: Performed by: INTERNAL MEDICINE

## 2017-02-28 PROCEDURE — 25000003 PHARM REV CODE 250: Performed by: PHYSICIAN ASSISTANT

## 2017-02-28 PROCEDURE — 99233 SBSQ HOSP IP/OBS HIGH 50: CPT | Mod: ,,, | Performed by: PHYSICIAN ASSISTANT

## 2017-02-28 RX ADMIN — SODIUM POLYSTYRENE SULFONATE 30 G: 15 SUSPENSION ORAL; RECTAL at 10:02

## 2017-02-28 RX ADMIN — GABAPENTIN 300 MG: 300 CAPSULE ORAL at 09:02

## 2017-02-28 RX ADMIN — VITAMIN D, TAB 1000IU (100/BT) 1000 UNITS: 25 TAB at 09:02

## 2017-02-28 RX ADMIN — METOPROLOL TARTRATE 25 MG: 25 TABLET ORAL at 10:02

## 2017-02-28 RX ADMIN — ENOXAPARIN SODIUM 40 MG: 100 INJECTION SUBCUTANEOUS at 12:02

## 2017-02-28 RX ADMIN — ASPIRIN 325 MG ORAL TABLET 325 MG: 325 PILL ORAL at 09:02

## 2017-02-28 RX ADMIN — VENLAFAXINE 150 MG: 75 TABLET ORAL at 10:02

## 2017-02-28 RX ADMIN — CYANOCOBALAMIN TAB 1000 MCG 2500 MCG: 1000 TAB at 09:02

## 2017-02-28 RX ADMIN — RAMIPRIL 5 MG: 5 CAPSULE ORAL at 09:02

## 2017-02-28 RX ADMIN — Medication 220 MG: at 10:02

## 2017-02-28 RX ADMIN — VENLAFAXINE 150 MG: 75 TABLET ORAL at 09:02

## 2017-02-28 RX ADMIN — GABAPENTIN 300 MG: 300 CAPSULE ORAL at 03:02

## 2017-02-28 RX ADMIN — Medication 3 ML: at 06:02

## 2017-02-28 RX ADMIN — TAMSULOSIN HYDROCHLORIDE 0.4 MG: 0.4 CAPSULE ORAL at 10:02

## 2017-02-28 RX ADMIN — PANTOPRAZOLE SODIUM 40 MG: 40 TABLET, DELAYED RELEASE ORAL at 10:02

## 2017-02-28 RX ADMIN — METOPROLOL TARTRATE 25 MG: 25 TABLET ORAL at 09:02

## 2017-02-28 RX ADMIN — TORSEMIDE 40 MG: 20 TABLET ORAL at 10:02

## 2017-02-28 RX ADMIN — OXYCODONE AND ACETAMINOPHEN 1 TABLET: 10; 325 TABLET ORAL at 09:02

## 2017-02-28 RX ADMIN — ATORVASTATIN CALCIUM 40 MG: 20 TABLET, FILM COATED ORAL at 09:02

## 2017-02-28 RX ADMIN — GABAPENTIN 300 MG: 300 CAPSULE ORAL at 06:02

## 2017-02-28 NOTE — PROGRESS NOTES
The wound base is moist/pink with yellow film over the wound bed.  The wound edges are callused.  The wound bed was cleaned with normal saline/gauze to remove the film.  Santyl was placed on the wound bed, covered with kirsten then covered with a mepore border dressing.  Nursing to continue every Mon-Wed-Fri.       02/27/17 1700       Pressure Ulcer 12/28/16 Left heel Stage IV   Date First Assessed: 12/28/16   Pressure Ulcer Present on Admission: (c) yes  Side: Left  Location: heel  Staging: Stage IV   Staging Stage IV   Healing Pressure Ulcer yes   Pressure Ulcer Risk Factors mobility;nutrition;shear/friction;moisture   Dressing Appearance intact;dry   Drainage Amount scant   Drainage Characteristics/Odor clear   Appearance pink;moist   Periwound Area normal skin tone   Wound Edges callused   Wound Length (cm) 4   Wound Width (cm) 2   Depth (cm) 0.3   Cleansed W/ sterile normal saline   Interventions enzymatic debriding agent (Santyl);promogran (matrix wound dressing)   Dressing Dressing changed;telfa island dressing   Dressing Change Due 03/01/17     Consent was received from the patient for the wound photograph.

## 2017-02-28 NOTE — PROGRESS NOTES
Pin Care performed to left lower extremity per MD orders. Pt tolerated well. Will continue to monitor.
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
## 2017-02-28 NOTE — PLAN OF CARE
Problem: Patient Care Overview  Goal: Plan of Care Review  Outcome: Ongoing (interventions implemented as appropriate)  Safety precautions maintained. Side rails up x2. Bed in lowest position. Call bell within reach. Tele monitoring maintained. Patient denies need for pain medication this shift. Tele monitoring maintained. Patient with limited mobility to LLE due to external fixator. Pin sites intact. Will continue to monitor.

## 2017-02-28 NOTE — PROGRESS NOTES
ORTHO PROGRESS NOTE:    Janusz Montgomery Jr. is a 72 y.o. male admitted on 2/24/2017  Hospital Day: 0      The patient was seen and examined this morning at the bedside. No acute issues overnight.  Pain to left leg and left heel is unchanged.  Afebrile    Awaiting sulfur colloid scan    PHYSICAL EXAM:  Awake/alert/oriented x 3  No acute distress  AFVSS  Good inspiratory effort with unlabored breathing    LLE: ex-fix in place; pin sites c/d/i. No erythema or swelling. Heel ulcer is stable in appearance; good granulation tissue present and no signs of infection. Grossly NVI.    Vitals:    02/28/17 0700 02/28/17 0800 02/28/17 0900 02/28/17 1100   BP:  (!) 113/56 (!) 125/55    BP Location:  Right arm Right arm    Patient Position:  Lying Lying    BP Method:  Automatic Automatic    Pulse: 78 76 84 81   Resp:  16     Temp:  99 °F (37.2 °C)     TempSrc:  Oral     SpO2:  95%     Weight:       Height:         I/O last 3 completed shifts:  In: 423 [P.O.:420; I.V.:3]  Out: 950 [Urine:950]    Recent Labs  Lab 02/26/17  0346 02/27/17  0454 02/28/17  0443   CALCIUM 9.1 9.0 9.3    133* 131*   K 4.6 4.1 5.5*   CO2 29 27 20*    99 99   BUN 20 22 28*   CREATININE 1.3 1.3 1.4       Recent Labs  Lab 02/26/17  0346 02/27/17  0454 02/28/17  0443   WBC 10.17 11.58 11.46   RBC 3.66* 3.79* 4.00*   HGB 11.6* 12.0* 12.5*   HCT 34.3* 35.9* 37.4*   * 368* 320     No results for input(s): INR, APTT in the last 72 hours.    Invalid input(s): PT    A/P: 72 y.o. 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 Admitted for fever of unknown origin.  -- Indium scan with sulfur colloid ordered  -- PT/OT: NWB LLE  -- DVT prophylaxis: Lovenox  -- Continue wound care to left heel per WOCN recommendations
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
## 2017-02-28 NOTE — SUBJECTIVE & OBJECTIVE
Interval History: no acute events overnight; no new complaints    Review of Systems   Constitutional: Positive for fever. Negative for chills, diaphoresis and fatigue.   Respiratory: Negative for cough, chest tightness, shortness of breath and wheezing.    Cardiovascular: Negative for chest pain, palpitations and leg swelling.   Gastrointestinal: Negative for abdominal distention, abdominal pain, diarrhea, nausea and vomiting.   Musculoskeletal: Positive for arthralgias, gait problem and myalgias.     Objective:     Vital Signs (Most Recent):  Temp: 99 °F (37.2 °C) (02/28/17 0800)  Pulse: 81 (02/28/17 1100)  Resp: 16 (02/28/17 0800)  BP: (!) 125/55 (02/28/17 0900)  SpO2: 95 % (02/28/17 0800) Vital Signs (24h Range):  Temp:  [98 °F (36.7 °C)-99.2 °F (37.3 °C)] 99 °F (37.2 °C)  Pulse:  [72-84] 81  Resp:  [16-22] 16  SpO2:  [92 %-96 %] 95 %  BP: ()/(45-67) 125/55     Weight: (!) 150.1 kg (331 lb)  Body mass index is 43.67 kg/(m^2).    Intake/Output Summary (Last 24 hours) at 02/28/17 1119  Last data filed at 02/27/17 1437   Gross per 24 hour   Intake                3 ml   Output                0 ml   Net                3 ml      Physical Exam   Constitutional: He is oriented to person, place, and time. He appears well-developed and well-nourished. No distress.   Cardiovascular: Normal rate, regular rhythm, normal heart sounds and intact distal pulses.    No murmur heard.  Pulmonary/Chest: Effort normal and breath sounds normal. No respiratory distress. He has no wheezes.   Abdominal: Soft. Bowel sounds are normal. He exhibits no distension. There is no tenderness.   Musculoskeletal:   +external fixator in place.  C/D/I.   Neurological: He is alert and oriented to person, place, and time. He has normal reflexes. No cranial nerve deficit.   Skin: He is not diaphoretic.   Nursing note and vitals reviewed.      Significant Labs: All pertinent labs within the past 24 hours have been reviewed.    Significant Imaging: I  have reviewed all pertinent imaging results/findings within the past 24 hours.
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
## 2017-02-28 NOTE — PLAN OF CARE
Problem: Patient Care Overview  Goal: Plan of Care Review  Outcome: Ongoing (interventions implemented as appropriate)  Patient maintained on fall precautions. Bed locked and lowest position. Call light within reach.  Patient instructed to use call light prn. Patient has LLE fixator in place. Patient denies pain at this time. Patient on contact precautions. Plan of care updated.

## 2017-03-01 ENCOUNTER — DOCUMENTATION ONLY (OUTPATIENT)
Dept: ORTHOPEDICS | Facility: CLINIC | Age: 72
End: 2017-03-01

## 2017-03-01 LAB
ANION GAP SERPL CALC-SCNC: 10 MMOL/L
BACTERIA BLD CULT: NORMAL
BACTERIA BLD CULT: NORMAL
BASOPHILS # BLD AUTO: 0.03 K/UL
BASOPHILS NFR BLD: 0.3 %
BUN SERPL-MCNC: 30 MG/DL
CALCIUM SERPL-MCNC: 9.1 MG/DL
CHLORIDE SERPL-SCNC: 96 MMOL/L
CO2 SERPL-SCNC: 29 MMOL/L
CREAT SERPL-MCNC: 1.5 MG/DL
DIFFERENTIAL METHOD: ABNORMAL
EOSINOPHIL # BLD AUTO: 1 K/UL
EOSINOPHIL NFR BLD: 8.7 %
ERYTHROCYTE [DISTWIDTH] IN BLOOD BY AUTOMATED COUNT: 13.1 %
EST. GFR  (AFRICAN AMERICAN): 53.8 ML/MIN/1.73 M^2
EST. GFR  (NON AFRICAN AMERICAN): 46.5 ML/MIN/1.73 M^2
GLUCOSE SERPL-MCNC: 104 MG/DL
HCT VFR BLD AUTO: 34.9 %
HGB BLD-MCNC: 11.4 G/DL
LYMPHOCYTES # BLD AUTO: 2.9 K/UL
LYMPHOCYTES NFR BLD: 23.9 %
MCH RBC QN AUTO: 30.8 PG
MCHC RBC AUTO-ENTMCNC: 32.7 %
MCV RBC AUTO: 94 FL
MONOCYTES # BLD AUTO: 0.8 K/UL
MONOCYTES NFR BLD: 7 %
NEUTROPHILS # BLD AUTO: 7.1 K/UL
NEUTROPHILS NFR BLD: 59.9 %
PLATELET # BLD AUTO: 409 K/UL
PMV BLD AUTO: 9.8 FL
POTASSIUM SERPL-SCNC: 3.9 MMOL/L
RBC # BLD AUTO: 3.7 M/UL
SODIUM SERPL-SCNC: 135 MMOL/L
WBC # BLD AUTO: 11.9 K/UL

## 2017-03-01 PROCEDURE — 25000003 PHARM REV CODE 250: Performed by: HOSPITALIST

## 2017-03-01 PROCEDURE — 99232 SBSQ HOSP IP/OBS MODERATE 35: CPT | Mod: ,,, | Performed by: INTERNAL MEDICINE

## 2017-03-01 PROCEDURE — 80048 BASIC METABOLIC PNL TOTAL CA: CPT

## 2017-03-01 PROCEDURE — 99233 SBSQ HOSP IP/OBS HIGH 50: CPT | Mod: ,,, | Performed by: PHYSICIAN ASSISTANT

## 2017-03-01 PROCEDURE — 25000003 PHARM REV CODE 250: Performed by: INTERNAL MEDICINE

## 2017-03-01 PROCEDURE — 85025 COMPLETE CBC W/AUTO DIFF WBC: CPT

## 2017-03-01 PROCEDURE — 36415 COLL VENOUS BLD VENIPUNCTURE: CPT

## 2017-03-01 PROCEDURE — 11000001 HC ACUTE MED/SURG PRIVATE ROOM

## 2017-03-01 PROCEDURE — 63600175 PHARM REV CODE 636 W HCPCS: Performed by: INTERNAL MEDICINE

## 2017-03-01 RX ADMIN — GABAPENTIN 300 MG: 300 CAPSULE ORAL at 01:03

## 2017-03-01 RX ADMIN — ATORVASTATIN CALCIUM 40 MG: 20 TABLET, FILM COATED ORAL at 09:03

## 2017-03-01 RX ADMIN — OXYCODONE AND ACETAMINOPHEN 1 TABLET: 10; 325 TABLET ORAL at 05:03

## 2017-03-01 RX ADMIN — CYANOCOBALAMIN TAB 1000 MCG 2500 MCG: 1000 TAB at 09:03

## 2017-03-01 RX ADMIN — VENLAFAXINE 150 MG: 75 TABLET ORAL at 09:03

## 2017-03-01 RX ADMIN — STANDARDIZED SENNA CONCENTRATE AND DOCUSATE SODIUM 1 TABLET: 8.6; 5 TABLET, FILM COATED ORAL at 09:03

## 2017-03-01 RX ADMIN — TAMSULOSIN HYDROCHLORIDE 0.4 MG: 0.4 CAPSULE ORAL at 09:03

## 2017-03-01 RX ADMIN — COLLAGENASE SANTYL: 250 OINTMENT TOPICAL at 09:03

## 2017-03-01 RX ADMIN — Medication 3 ML: at 09:03

## 2017-03-01 RX ADMIN — GABAPENTIN 300 MG: 300 CAPSULE ORAL at 09:03

## 2017-03-01 RX ADMIN — OXYCODONE AND ACETAMINOPHEN 1 TABLET: 10; 325 TABLET ORAL at 09:03

## 2017-03-01 RX ADMIN — ENOXAPARIN SODIUM 40 MG: 100 INJECTION SUBCUTANEOUS at 01:03

## 2017-03-01 RX ADMIN — METOPROLOL TARTRATE 25 MG: 25 TABLET ORAL at 09:03

## 2017-03-01 RX ADMIN — ASPIRIN 325 MG ORAL TABLET 325 MG: 325 PILL ORAL at 09:03

## 2017-03-01 RX ADMIN — VITAMIN D, TAB 1000IU (100/BT) 1000 UNITS: 25 TAB at 09:03

## 2017-03-01 RX ADMIN — Medication 220 MG: at 09:03

## 2017-03-01 RX ADMIN — GABAPENTIN 300 MG: 300 CAPSULE ORAL at 05:03

## 2017-03-01 RX ADMIN — PANTOPRAZOLE SODIUM 40 MG: 40 TABLET, DELAYED RELEASE ORAL at 09:03

## 2017-03-01 NOTE — PLAN OF CARE
Problem: Patient Care Overview  Goal: Plan of Care Review  Outcome: Ongoing (interventions implemented as appropriate)  Patient maintained on fall precautions. Pin care done. Wound care done. Patient tolerated well. Patient assessed for pain q 4 hours. Patient on cardiac monitoring showing normal sinus rhythm. Plan of care updated.

## 2017-03-01 NOTE — NURSING TRANSFER
Nursing Transfer Note      3/1/2017     Transfer from OBS 4 to 943A  Transfer via stretcher  Transfer with belongings and medication  Transported by patient transport  Medicines sent: yes   Chart send with patient: Yes  Notified: Patient and charge nurse on 9th floor.     Received orders for transfer. Patient is AAOX4. Patient in no acute distress. Offered to call patient's wife to notify her of transfer but patient declined stating he will call her later.
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
## 2017-03-01 NOTE — PLAN OF CARE
Problem: Patient Care Overview  Goal: Plan of Care Review  Outcome: Ongoing (interventions implemented as appropriate)  Patient AAO,VSS. Complains of Lt foot pain rating 6/10. PRN medication provided. POC reviewed with patient. Tele monitor on patient. Urinal provided at bedside. Bed in lowest position with wheels locked. Call bell and side table in reach of patient. Will continue to monitor patient for pain
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
## 2017-03-02 LAB
ANION GAP SERPL CALC-SCNC: 9 MMOL/L
BASOPHILS # BLD AUTO: 0.03 K/UL
BASOPHILS NFR BLD: 0.3 %
BUN SERPL-MCNC: 21 MG/DL
CALCIUM SERPL-MCNC: 9.1 MG/DL
CHLORIDE SERPL-SCNC: 96 MMOL/L
CO2 SERPL-SCNC: 26 MMOL/L
CREAT SERPL-MCNC: 1.2 MG/DL
DIFFERENTIAL METHOD: ABNORMAL
EOSINOPHIL # BLD AUTO: 1 K/UL
EOSINOPHIL NFR BLD: 9.4 %
ERYTHROCYTE [DISTWIDTH] IN BLOOD BY AUTOMATED COUNT: 13.1 %
EST. GFR  (AFRICAN AMERICAN): >60 ML/MIN/1.73 M^2
EST. GFR  (NON AFRICAN AMERICAN): >60 ML/MIN/1.73 M^2
GLUCOSE SERPL-MCNC: 98 MG/DL
HCT VFR BLD AUTO: 33.3 %
HGB BLD-MCNC: 11.5 G/DL
LYMPHOCYTES # BLD AUTO: 2.9 K/UL
LYMPHOCYTES NFR BLD: 26.7 %
MCH RBC QN AUTO: 31.9 PG
MCHC RBC AUTO-ENTMCNC: 34.5 %
MCV RBC AUTO: 92 FL
MONOCYTES # BLD AUTO: 0.6 K/UL
MONOCYTES NFR BLD: 5.6 %
NEUTROPHILS # BLD AUTO: 6.2 K/UL
NEUTROPHILS NFR BLD: 57.8 %
PLATELET # BLD AUTO: 342 K/UL
PMV BLD AUTO: 10 FL
POTASSIUM SERPL-SCNC: 3.9 MMOL/L
RBC # BLD AUTO: 3.61 M/UL
SODIUM SERPL-SCNC: 131 MMOL/L
WBC # BLD AUTO: 10.72 K/UL

## 2017-03-02 PROCEDURE — 25000003 PHARM REV CODE 250: Performed by: PHYSICIAN ASSISTANT

## 2017-03-02 PROCEDURE — 11000001 HC ACUTE MED/SURG PRIVATE ROOM

## 2017-03-02 PROCEDURE — 80048 BASIC METABOLIC PNL TOTAL CA: CPT

## 2017-03-02 PROCEDURE — 25000003 PHARM REV CODE 250: Performed by: INTERNAL MEDICINE

## 2017-03-02 PROCEDURE — 85025 COMPLETE CBC W/AUTO DIFF WBC: CPT

## 2017-03-02 PROCEDURE — 99233 SBSQ HOSP IP/OBS HIGH 50: CPT | Mod: ,,, | Performed by: INTERNAL MEDICINE

## 2017-03-02 PROCEDURE — 63600175 PHARM REV CODE 636 W HCPCS: Performed by: INTERNAL MEDICINE

## 2017-03-02 PROCEDURE — 36415 COLL VENOUS BLD VENIPUNCTURE: CPT

## 2017-03-02 PROCEDURE — 99233 SBSQ HOSP IP/OBS HIGH 50: CPT | Mod: ,,, | Performed by: PHYSICIAN ASSISTANT

## 2017-03-02 RX ORDER — TORSEMIDE 10 MG/1
40 TABLET ORAL DAILY
Status: DISCONTINUED | OUTPATIENT
Start: 2017-03-02 | End: 2017-03-03 | Stop reason: HOSPADM

## 2017-03-02 RX ORDER — RAMIPRIL 2.5 MG/1
5 CAPSULE ORAL DAILY
Status: DISCONTINUED | OUTPATIENT
Start: 2017-03-02 | End: 2017-03-03 | Stop reason: HOSPADM

## 2017-03-02 RX ADMIN — ATORVASTATIN CALCIUM 40 MG: 20 TABLET, FILM COATED ORAL at 09:03

## 2017-03-02 RX ADMIN — OXYCODONE AND ACETAMINOPHEN 1 TABLET: 10; 325 TABLET ORAL at 06:03

## 2017-03-02 RX ADMIN — RAMIPRIL 5 MG: 2.5 CAPSULE ORAL at 09:03

## 2017-03-02 RX ADMIN — STANDARDIZED SENNA CONCENTRATE AND DOCUSATE SODIUM 1 TABLET: 8.6; 5 TABLET, FILM COATED ORAL at 10:03

## 2017-03-02 RX ADMIN — METOPROLOL TARTRATE 25 MG: 25 TABLET ORAL at 10:03

## 2017-03-02 RX ADMIN — ASPIRIN 325 MG ORAL TABLET 325 MG: 325 PILL ORAL at 10:03

## 2017-03-02 RX ADMIN — Medication 3 ML: at 10:03

## 2017-03-02 RX ADMIN — Medication 220 MG: at 09:03

## 2017-03-02 RX ADMIN — TAMSULOSIN HYDROCHLORIDE 0.4 MG: 0.4 CAPSULE ORAL at 09:03

## 2017-03-02 RX ADMIN — ENOXAPARIN SODIUM 40 MG: 100 INJECTION SUBCUTANEOUS at 04:03

## 2017-03-02 RX ADMIN — VENLAFAXINE 150 MG: 75 TABLET ORAL at 10:03

## 2017-03-02 RX ADMIN — PANTOPRAZOLE SODIUM 40 MG: 40 TABLET, DELAYED RELEASE ORAL at 09:03

## 2017-03-02 RX ADMIN — METOPROLOL TARTRATE 25 MG: 25 TABLET ORAL at 09:03

## 2017-03-02 RX ADMIN — VENLAFAXINE 150 MG: 75 TABLET ORAL at 09:03

## 2017-03-02 RX ADMIN — CYANOCOBALAMIN TAB 1000 MCG 2500 MCG: 1000 TAB at 09:03

## 2017-03-02 RX ADMIN — ASPIRIN 325 MG ORAL TABLET 325 MG: 325 PILL ORAL at 09:03

## 2017-03-02 RX ADMIN — GABAPENTIN 300 MG: 300 CAPSULE ORAL at 05:03

## 2017-03-02 RX ADMIN — STANDARDIZED SENNA CONCENTRATE AND DOCUSATE SODIUM 1 TABLET: 8.6; 5 TABLET, FILM COATED ORAL at 09:03

## 2017-03-02 RX ADMIN — GABAPENTIN 300 MG: 300 CAPSULE ORAL at 10:03

## 2017-03-02 RX ADMIN — TORSEMIDE 40 MG: 10 TABLET ORAL at 09:03
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
## 2017-03-02 NOTE — SUBJECTIVE & OBJECTIVE
Interval History: no acute events overnight; no new complaints.  Pt going for second portion of indium scan today.    Review of Systems   Constitutional: Positive for fever. Negative for chills, diaphoresis and fatigue.   Respiratory: Negative for cough, chest tightness, shortness of breath and wheezing.    Cardiovascular: Negative for chest pain, palpitations and leg swelling.   Gastrointestinal: Negative for abdominal distention, abdominal pain, diarrhea, nausea and vomiting.   Musculoskeletal: Positive for arthralgias, gait problem and myalgias.     Objective:     Vital Signs (Most Recent):  Temp: 98.7 °F (37.1 °C) (03/02/17 1149)  Pulse: 66 (03/02/17 1149)  Resp: 18 (03/02/17 1149)  BP: (!) 122/56 (03/02/17 1149)  SpO2: (!) 92 % (03/02/17 1149) Vital Signs (24h Range):  Temp:  [98.3 °F (36.8 °C)-99 °F (37.2 °C)] 98.7 °F (37.1 °C)  Pulse:  [66-77] 66  Resp:  [16-20] 18  SpO2:  [90 %-92 %] 92 %  BP: (119-146)/(56-64) 122/56     Weight: (!) 150.1 kg (331 lb)  Body mass index is 43.67 kg/(m^2).    Intake/Output Summary (Last 24 hours) at 03/02/17 1421  Last data filed at 03/02/17 0600   Gross per 24 hour   Intake              350 ml   Output              370 ml   Net              -20 ml      Physical Exam   Constitutional: He is oriented to person, place, and time. He appears well-developed and well-nourished. No distress.   Cardiovascular: Normal rate, regular rhythm, normal heart sounds and intact distal pulses.    No murmur heard.  Pulmonary/Chest: Effort normal and breath sounds normal. No respiratory distress. He has no wheezes.   Abdominal: Soft. Bowel sounds are normal. He exhibits no distension. There is no tenderness.   Musculoskeletal:   +external fixator in place.  C/D/I.   Neurological: He is alert and oriented to person, place, and time. He has normal reflexes. No cranial nerve deficit.   Skin: He is not diaphoretic.   Nursing note and vitals reviewed.      Significant Labs: All pertinent labs within  the past 24 hours have been reviewed.    Significant Imaging: I have reviewed all pertinent imaging results/findings within the past 24 hours.

## 2017-03-02 NOTE — ASSESSMENT & PLAN NOTE
- CXR shows borderline cardiomegaly, mild edema and pleural fluid; pulmonary edema vs pneumonia  - Xray of L tibia fibula shows TKA in place with good alignment and no complication; side plate which has fractured distally in the area of the tibial fracture and there is an adjacent fibular fracture; internal/external Ilizarov fixation device in place; bones show good alignment.   - Elevated ESR/CRP. Procalcitonin WNL. Blood cx NGTD. Urine cx pending. Tylenol PRN for fever. ID consulted and appreciate recs. Per ortho surg, no concern for left leg as source of fevers and no acute operative intervention.  2/25: Tmax of 100.8F. 2d echo shows EF 55% without signs of pericardial effusion, intracavity mass, thrombi, or vegetation. Per ID, observe off antibiotics for now; verify with Ortho whether fixation device is MRI-compatible (probably not); consider Gallium-67/indium scan with Ortho/Radiology guidance as to best modality. Per ortho, all fixation devices are MRI compatible. Ordered MRI w/wo of LLE due to concern for hardware infection.  2/26: Spoke with radiology who believes patient's significant internal and external fixation hardware will significantly limit MRI and make evaluation for osteomyelitis of very limited utility; radiology recommended 3 phase bone scan vs gallium scan. Spoke with ID, ordered Gallium scan of LLE. Urine cultures shows Enterococcus species: I&S pending.   2/27: Per ortho surg, indium scan with sulfur colloid ordered 2/2 more definitive to look at distal tibia and heel. Per ID, treatment not indicated for urine cx result and this is not viable cause of his fevers.  Scan 3/2 and 3/3.

## 2017-03-03 ENCOUNTER — TELEPHONE (OUTPATIENT)
Dept: ORTHOPEDICS | Facility: CLINIC | Age: 72
End: 2017-03-03

## 2017-03-03 VITALS
DIASTOLIC BLOOD PRESSURE: 62 MMHG | OXYGEN SATURATION: 96 % | RESPIRATION RATE: 18 BRPM | HEART RATE: 66 BPM | TEMPERATURE: 98 F | SYSTOLIC BLOOD PRESSURE: 130 MMHG | WEIGHT: 315 LBS | BODY MASS INDEX: 41.75 KG/M2 | HEIGHT: 73 IN

## 2017-03-03 PROBLEM — R50.9 FEVER: Status: RESOLVED | Noted: 2017-02-24 | Resolved: 2017-03-03

## 2017-03-03 PROBLEM — E87.70 FLUID OVERLOAD: Status: RESOLVED | Noted: 2017-02-25 | Resolved: 2017-03-03

## 2017-03-03 PROBLEM — E87.5 HYPERKALEMIA: Status: RESOLVED | Noted: 2017-02-28 | Resolved: 2017-03-03

## 2017-03-03 LAB
ANION GAP SERPL CALC-SCNC: 12 MMOL/L
BASOPHILS # BLD AUTO: 0.05 K/UL
BASOPHILS NFR BLD: 0.4 %
BUN SERPL-MCNC: 20 MG/DL
CALCIUM SERPL-MCNC: 8.9 MG/DL
CHLORIDE SERPL-SCNC: 98 MMOL/L
CO2 SERPL-SCNC: 24 MMOL/L
CREAT SERPL-MCNC: 1.1 MG/DL
DIFFERENTIAL METHOD: ABNORMAL
EOSINOPHIL # BLD AUTO: 1.1 K/UL
EOSINOPHIL NFR BLD: 9.4 %
ERYTHROCYTE [DISTWIDTH] IN BLOOD BY AUTOMATED COUNT: 13.2 %
EST. GFR  (AFRICAN AMERICAN): >60 ML/MIN/1.73 M^2
EST. GFR  (NON AFRICAN AMERICAN): >60 ML/MIN/1.73 M^2
GLUCOSE SERPL-MCNC: 106 MG/DL
HCT VFR BLD AUTO: 32.4 %
HGB BLD-MCNC: 11 G/DL
LYMPHOCYTES # BLD AUTO: 2.2 K/UL
LYMPHOCYTES NFR BLD: 19.6 %
MCH RBC QN AUTO: 31.2 PG
MCHC RBC AUTO-ENTMCNC: 34 %
MCV RBC AUTO: 92 FL
MONOCYTES # BLD AUTO: 0.6 K/UL
MONOCYTES NFR BLD: 5 %
NEUTROPHILS # BLD AUTO: 7.3 K/UL
NEUTROPHILS NFR BLD: 65.4 %
PLATELET # BLD AUTO: 348 K/UL
PMV BLD AUTO: 10.2 FL
POTASSIUM SERPL-SCNC: 3.7 MMOL/L
RBC # BLD AUTO: 3.53 M/UL
SODIUM SERPL-SCNC: 134 MMOL/L
WBC # BLD AUTO: 11.19 K/UL

## 2017-03-03 PROCEDURE — 25000003 PHARM REV CODE 250: Performed by: INTERNAL MEDICINE

## 2017-03-03 PROCEDURE — 25000003 PHARM REV CODE 250: Performed by: PHYSICIAN ASSISTANT

## 2017-03-03 PROCEDURE — 36415 COLL VENOUS BLD VENIPUNCTURE: CPT

## 2017-03-03 PROCEDURE — 99239 HOSP IP/OBS DSCHRG MGMT >30: CPT | Mod: ,,, | Performed by: PHYSICIAN ASSISTANT

## 2017-03-03 PROCEDURE — 85025 COMPLETE CBC W/AUTO DIFF WBC: CPT

## 2017-03-03 PROCEDURE — 80048 BASIC METABOLIC PNL TOTAL CA: CPT

## 2017-03-03 PROCEDURE — 63600175 PHARM REV CODE 636 W HCPCS: Performed by: INTERNAL MEDICINE

## 2017-03-03 RX ADMIN — METOPROLOL TARTRATE 25 MG: 25 TABLET ORAL at 09:03

## 2017-03-03 RX ADMIN — PANTOPRAZOLE SODIUM 40 MG: 40 TABLET, DELAYED RELEASE ORAL at 09:03

## 2017-03-03 RX ADMIN — TORSEMIDE 40 MG: 10 TABLET ORAL at 09:03

## 2017-03-03 RX ADMIN — TAMSULOSIN HYDROCHLORIDE 0.4 MG: 0.4 CAPSULE ORAL at 09:03

## 2017-03-03 RX ADMIN — ENOXAPARIN SODIUM 40 MG: 100 INJECTION SUBCUTANEOUS at 01:03

## 2017-03-03 RX ADMIN — VITAMIN D, TAB 1000IU (100/BT) 1000 UNITS: 25 TAB at 09:03

## 2017-03-03 RX ADMIN — OXYCODONE AND ACETAMINOPHEN 1 TABLET: 10; 325 TABLET ORAL at 04:03

## 2017-03-03 RX ADMIN — GABAPENTIN 300 MG: 300 CAPSULE ORAL at 02:03

## 2017-03-03 RX ADMIN — ASPIRIN 325 MG ORAL TABLET 325 MG: 325 PILL ORAL at 09:03

## 2017-03-03 RX ADMIN — COLLAGENASE SANTYL: 250 OINTMENT TOPICAL at 09:03

## 2017-03-03 RX ADMIN — OXYCODONE AND ACETAMINOPHEN 1 TABLET: 10; 325 TABLET ORAL at 09:03

## 2017-03-03 RX ADMIN — ATORVASTATIN CALCIUM 40 MG: 20 TABLET, FILM COATED ORAL at 09:03

## 2017-03-03 RX ADMIN — RAMIPRIL 5 MG: 2.5 CAPSULE ORAL at 09:03

## 2017-03-03 RX ADMIN — Medication 3 ML: at 05:03

## 2017-03-03 RX ADMIN — Medication 220 MG: at 09:03

## 2017-03-03 RX ADMIN — GABAPENTIN 300 MG: 300 CAPSULE ORAL at 05:03

## 2017-03-03 RX ADMIN — CYANOCOBALAMIN TAB 1000 MCG 2500 MCG: 1000 TAB at 09:03

## 2017-03-03 RX ADMIN — VENLAFAXINE 150 MG: 75 TABLET ORAL at 09:03

## 2017-03-03 RX ADMIN — Medication 3 ML: at 02:03

## 2017-03-03 RX ADMIN — STANDARDIZED SENNA CONCENTRATE AND DOCUSATE SODIUM 1 TABLET: 8.6; 5 TABLET, FILM COATED ORAL at 09:03

## 2017-03-03 NOTE — PLAN OF CARE
[facility]    HOME HEALTH ORDERS  FACE TO FACE ENCOUNTER    Patient Name: Janusz Montgomery Jr.  YOB: 1945    PCP: Miguelina Weathers MD   PCP Address: [address] [facility] / [place] [address]  PCP Phone Number: [phone]  PCP Fax: [phone]    Encounter Date: 03/03/2017    Admit to Home Health    Diagnoses:  Active Hospital Problems    Diagnosis  POA    Essential hypertension [I10]  Yes     Priority: 2     Coronary artery disease involving native coronary artery of native heart without angina pectoris s/p RCA stent [I25.10]  Yes     Priority: 4     BPH with urinary obstruction [N40.1, N13.8]  Yes     Priority: 5     Prostate cancer [C61]  Yes     Priority: 6      - followed by urology, Dr. Young      Foot ulcer [L97.509]  Unknown      Resolved Hospital Problems    Diagnosis Date Resolved POA    *Fever [R50.9] 03/03/2017 Yes     Priority: 1 - High    Fluid overload [E87.70] 03/03/2017 Unknown     Priority: 3     Hyperkalemia [E87.5] 03/03/2017 Unknown       Future Appointments  Date Time Provider Department Center   3/28/2017 1:00 PM Benny Cannon PA-C [facility] ID Re[facility] Hemphill   4/3/2017 8:00 AM LAB, [facility] [place]   4/28/2017 2:00 PM Miguelina Weathers MD [facility] [place]     Follow-up Information     Follow up with Reji Romeoelia - Infectious Diseases On 3/28/2017.    Specialty:  Infectious Diseases    Why:  appointment 1:00pm    Contact information:    [address] Hemphill  [facility] [address] [phone]    Additional information:    1st Floor - [address]            I have seen and examined this patient face to face today. My clinical findings that support the need for the home health skilled services and home bound status are the following:  Weakness/numbness causing balance and gait disturbance due to Weakness/Debility making it taxing to leave home.    Allergies:  Review of patient's allergies indicates:   Allergen Reactions    Bacitracin Hives and  Itching       Diet: regular diet    Activities: activity as tolerated    Nursing:   SN to complete comprehensive assessment including routine vital signs. Instruct on disease process and s/s of complications to report to MD. Review/verify medication list sent home with the patient at time of discharge  and instruct patient/caregiver as needed. Frequency may be adjusted depending on start of care date.    Notify MD if SBP > 160 or < 90; DBP > 90 or < 50; HR > 120 or < 50; Temp > 101; Other:         CONSULTS:    Physical Therapy to evaluate and treat. Evaluate for home safety and equipment needs; Establish/upgrade home exercise program. Perform / instruct on therapeutic exercises, gait training, transfer training, and Range of Motion.  Occupational Therapy to evaluate and treat. Evaluate home environment for safety and equipment needs. Perform/Instruct on transfers, ADL training, ROM, and therapeutic exercises.  Aide to provide assistance with personal care, ADLs, and vital signs.    MISCELLANEOUS CARE:  Wound Care Orders:  yes:  Foot Ulcer:  Location: L heel    WOUND CARE ORDERS  yes:  Pressure Ulcer(s) Stage IV:  Location: L heel    Consult ET nurse        Apply the following to wound:   Collagenase (Santyl) daily, cover with telfa, wrap with with kerlix dressing      Medications: Review discharge medications with patient and family and provide education.      Current Discharge Medication List      CONTINUE these medications which have NOT CHANGED    Details   artificial tears (ISOPTO TEARS) 0.5 % ophthalmic solution Place 1 drop into both eyes as needed.      atorvastatin (LIPITOR) 40 MG tablet TAKE ONE TABLET BY MOUTH ONCE DAILY  Qty: 90 tablet, Refills: 0      collagenase ointment Apply topically every Mon, Wed, Fri. Nursing to apply Santyl  nickel thick to wound bed and edges and cover with damp gauze (to activate santyl) daily and cover with telfa island dressing (mepore) over each pressure injury. Leelee has  autolytic debridement properties and will promote healing.  Qty: 60 g, Refills: 0      CYANOCOBALAMIN, VITAMIN B-12, (VITAMIN B-12 ORAL) Take 2,500 mcg by mouth once daily.      gabapentin (NEURONTIN) 300 MG capsule Take 1 capsule (300 mg total) by mouth 3 (three) times daily.  Qty: 90 capsule, Refills: 11      hydrOXYzine HCl (ATARAX) 25 MG tablet Take 1 tablet (25 mg total) by mouth 3 (three) times daily as needed for Itching.  Qty: 42 tablet, Refills: 0    Associated Diagnoses: Drug reaction, initial encounter      melatonin 3 mg Tab Take 6 mg by mouth nightly as needed.      metoprolol tartrate (LOPRESSOR) 25 MG tablet Take 1 tablet (25 mg total) by mouth 2 (two) times daily.  Qty: 180 tablet, Refills: 3    Associated Diagnoses: Essential hypertension      nitroGLYCERIN (NITROSTAT) 0.4 MG SL tablet Place 1 tablet (0.4 mg total) under the tongue every 5 (five) minutes as needed for Chest pain.  Qty: 100 tablet, Refills: 0    Associated Diagnoses: Coronary artery disease      omeprazole (PRILOSEC) 20 MG capsule Take 2 capsules (40 mg total) by mouth once daily.  Qty: 180 capsule, Refills: 0      oxycodone-acetaminophen (PERCOCET)  mg per tablet Take 1 tablet by mouth every 6 (six) hours as needed for Pain.      polyethylene glycol (GLYCOLAX) 17 gram PwPk Take 17 g by mouth once daily.  Qty: 30 packet, Refills: 0      ramipril (ALTACE) 5 MG capsule TAKE ONE CAPSULE BY MOUTH ONCE DAILY  Qty: 90 capsule, Refills: 0      tamsulosin (FLOMAX) 0.4 mg Cp24 Take 1 capsule (0.4 mg total) by mouth once daily.  Qty: 90 capsule, Refills: 3    Associated Diagnoses: Urinary frequency      torsemide (DEMADEX) 20 MG Tab Take 2 tablets (40 mg total) by mouth once daily. 1 tablet (20 mg) daily for 3 days.  Qty: 180 tablet, Refills: 0      venlafaxine (EFFEXOR) 75 MG tablet Take 2 tablets (150 mg total) by mouth 2 (two) times daily.  Qty: 360 tablet, Refills: 0    Associated Diagnoses: Mixed anxiety and depressive disorder       vitamin D 1000 units Tab Take 1 tablet (1,000 Units total) by mouth once daily.      zinc sulfate (ZINCATE) 220 (50) mg capsule Take 1 capsule (220 mg total) by mouth once daily.      aspirin 325 MG tablet Take 1 tablet (325 mg total) by mouth 2 (two) times daily.  Qty: 60 tablet, Refills: 0      diclofenac sodium 1 % Gel Apply 2 g topically once daily. Apply 2 g to left shoulder  Qty: 1 Tube, Refills: 0      senna-docusate 8.6-50 mg (PERICOLACE) 8.6-50 mg per tablet Take 1 tablet by mouth 2 (two) times daily.         STOP taking these medications       acyclovir (ZOVIRAX) 800 MG Tab Comments:   Reason for Stopping:         lactobacillus acidophilus & bulgar (LACTINEX) 100 million cell packet Comments:   Reason for Stopping:         tramadol (ULTRAM) 50 mg tablet Comments:   Reason for Stopping:               I certify that this patient is confined to his home and needs intermittent skilled nursing care, physical therapy and occupational therapy.  Wound Care three times weekly.
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
## 2017-03-03 NOTE — PLAN OF CARE
Problem: Fall Risk (Adult)  Goal: Absence of Falls  Patient will demonstrate the desired outcomes by discharge/transition of care.   Outcome: Outcome(s) achieved Date Met:  03/03/17  Pt is free of falls and injuries .     Problem: Pressure Ulcer Risk (Isidoro Scale) (Adult,Obstetrics,Pediatric)  Goal: Skin Integrity  Patient will demonstrate the desired outcomes by discharge/transition of care.   Outcome: Outcome(s) achieved Date Met:  03/03/17  No skin breakdowns .

## 2017-03-03 NOTE — PROGRESS NOTES
Pt is ready for for discharge per MD order . Pt is awake ,alert and oriented x 4 . Stable V/S . Pt is free of falls and injuries per shift . IV access removed and discharge papers given and explained to pt by the discharge nurse . Pt and his family want to speak  With the doctor . Internal medicine Dr at the pt's bedside . Pt will leave the hospital to his home with his family .

## 2017-03-03 NOTE — TELEPHONE ENCOUNTER
Spoke with pt's wife.   Advised her that I do not have a time frame of when Dr Cortés will make rounds today.  She is wondering if pt will be discharged today.   Advised wife that I do not know this info.   She will contact the nurse taking care of pt today.

## 2017-03-03 NOTE — TELEPHONE ENCOUNTER
----- Message from Romero Martin sent at 3/3/2017  9:24 AM CST -----  Contact: spouse  Spouse called to find out if Dr. Cortés or a fellow will visit the pt in the hospital today. [phone]

## 2017-03-04 NOTE — PROGRESS NOTES
Pt and his family talked to ortho and infection disease doctors . Pt left the floor on wheelchair with his family to home .

## 2017-03-06 ENCOUNTER — PATIENT OUTREACH (OUTPATIENT)
Dept: ADMINISTRATIVE | Facility: CLINIC | Age: 72
End: 2017-03-06
Payer: MEDICARE

## 2017-03-06 ENCOUNTER — TELEPHONE (OUTPATIENT)
Dept: ORTHOPEDICS | Facility: CLINIC | Age: 72
End: 2017-03-06

## 2017-03-06 NOTE — PATIENT INSTRUCTIONS
Febrile Illness, Uncertain Cause (Adult)  You have a fever, but the cause is not certain. A fever is a natural reaction of the body to an illness such as infection due to a virus or bacteria. In most cases, the temperature itself is not harmful. It actually helps the body fight infections. A fever does not need to be treated unless you feel very uncomfortable.  Sometimes a fever can be an early sign of a more serious infection, so make sure to follow up if your condition worsens.  Home care  Unless given other instructions by your healthcare provider, follow these guidelines when caring for yourself at home.  General care  · If your symptoms are severe, rest at home for the first 2 to 3 days. When you resume activity, don't let yourself get too tired.  · Do not smoke. Also avoid being exposed to secondhand smoke.  · Your appetite may be poor, so a light diet is fine. Avoid dehydration by drinking 6 to 8 glasses of fluids per day (such as water, soft drinks, sports drinks, juices, tea, or soup). Extra fluids will help loosen secretions in the nose and lungs.  Medicines  · You can take acetaminophen or ibuprofen for pain, unless you were given a different fever-reducing/pain medicine to use. (Note: If you have chronic liver or kidney disease or have ever had a stomach ulcer or gastrointestinal bleeding, talk with your healthcare provider before using these medicines. Also talk to your provider if you are taking medicine to prevent blood clots.) Aspirin should never be given to anyone younger than 18 years of age who is ill with a viral infection or fever. It may cause severe liver or brain damage.  · If you were given antibiotics, take them until they are used up, or your healthcare provider tells you to stop. It is important to finish the antibiotics even though you feel better. This is to make sure the infection has cleared. Be aware that antibiotics are not usually given for a fever with an unknown  cause.  · Over-the-counter medicines will not shorten the duration of the illness. However, they may be helpful for the following symptoms: cough, sore throat, or nasal and sinus congestion. Ask your pharmacist for product suggestions. (Note: Do not use decongestants if you have high blood pressure.)  Follow-up care  Follow up with your healthcare provider, or as advised.  · If a culture was done, you will be notified if your treatment needs to be changed. You can call as directed for the results.  · If X-rays, a CT, or an ultrasound were done, a specialist will review them. You will be notified of any findings that may affect your care.  Call 911  Contact emergency services right away if any of these occur:  · Trouble breathing or swallowing, or wheezing  · Chest pain  · Confusion  · Extreme drowsiness or trouble awakening  · Fainting or loss of consciousness  · Rapid heart rate  · Low blood pressure  · Vomiting blood, or large amounts of blood in stool  · Seizure  When to seek medical advice  Call your healthcare provider right away if any of these occur:  · Cough with lots of colored sputum (mucus) or blood in your sputum  · Severe headache  · Face, neck, throat, or ear pain  · Feeling drowsy  · Abdominal pain  · Repeated vomiting or diarrhea  · Joint pain or a new rash  · Burning when urinating  · Fever of 100.4°F (38°C) or higher, that does not get better after taking fever-reducing medicine  · Feeling weak or dizzy  Date Last Reviewed: 7/30/2015  © 5166-2940 [employer]. [address] [place] [address], [place] [address]. All rights reserved. This information is not intended as a substitute for professional medical care. Always follow your healthcare professional's instructions.

## 2017-03-13 ENCOUNTER — HOSPITAL ENCOUNTER (OUTPATIENT)
Dept: RADIOLOGY | Facility: HOSPITAL | Age: 72
Discharge: HOME | DRG: 559 | End: 2017-03-13
Attending: ORTHOPAEDIC SURGERY
Payer: MEDICARE

## 2017-03-13 ENCOUNTER — OFFICE VISIT (OUTPATIENT)
Dept: ORTHOPEDICS | Facility: CLINIC | Age: 72
DRG: 559 | End: 2017-03-13
Payer: MEDICARE

## 2017-03-13 ENCOUNTER — PATIENT MESSAGE (OUTPATIENT)
Dept: ORTHOPEDICS | Facility: CLINIC | Age: 72
End: 2017-03-13

## 2017-03-13 ENCOUNTER — OUTPATIENT CASE MANAGEMENT (OUTPATIENT)
Dept: ADMINISTRATIVE | Facility: OTHER | Age: 72
End: 2017-03-13

## 2017-03-13 VITALS
HEIGHT: 73 IN | DIASTOLIC BLOOD PRESSURE: 71 MMHG | HEART RATE: 72 BPM | RESPIRATION RATE: 16 BRPM | BODY MASS INDEX: 41.75 KG/M2 | WEIGHT: 315 LBS | SYSTOLIC BLOOD PRESSURE: 119 MMHG

## 2017-03-13 DIAGNOSIS — S82.402N TRAUMATIC TYPE III OPEN FRACTURE OF SHAFT OF LEFT TIBIA AND FIBULA WITH NONUNION: ICD-10-CM

## 2017-03-13 DIAGNOSIS — L89.624 DECUBITUS ULCER OF LEFT HEEL, STAGE 4: ICD-10-CM

## 2017-03-13 DIAGNOSIS — S82.202N TRAUMATIC TYPE III OPEN FRACTURE OF SHAFT OF LEFT TIBIA AND FIBULA WITH NONUNION: ICD-10-CM

## 2017-03-13 DIAGNOSIS — Z98.890 STATUS POST SURGERY: Primary | ICD-10-CM

## 2017-03-13 DIAGNOSIS — Z98.890 STATUS POST SURGERY: ICD-10-CM

## 2017-03-13 PROCEDURE — 99024 POSTOP FOLLOW-UP VISIT: CPT | Mod: S$GLB,,, | Performed by: PHYSICIAN ASSISTANT

## 2017-03-13 PROCEDURE — 99999 PR PBB SHADOW E&M-EST. PATIENT-LVL V: CPT | Mod: PBBFAC,,, | Performed by: PHYSICIAN ASSISTANT

## 2017-03-13 PROCEDURE — 73610 X-RAY EXAM OF ANKLE: CPT | Mod: 26,LT,, | Performed by: RADIOLOGY

## 2017-03-13 PROCEDURE — 99499 UNLISTED E&M SERVICE: CPT | Mod: S$GLB,,, | Performed by: PHYSICIAN ASSISTANT

## 2017-03-13 PROCEDURE — 73590 X-RAY EXAM OF LOWER LEG: CPT | Mod: 26,LT,, | Performed by: RADIOLOGY

## 2017-03-13 RX ORDER — OXYCODONE AND ACETAMINOPHEN 10; 325 MG/1; MG/1
1 TABLET ORAL
Qty: 60 TABLET | Refills: 0 | Status: ON HOLD | OUTPATIENT
Start: 2017-03-13 | End: 2017-04-14 | Stop reason: HOSPADM

## 2017-03-13 NOTE — PROGRESS NOTES
Attempted to contact pt to complete initial assessment. Spoke to Mrs. Montgomery and she states pt is still asleep. She states he was up all night with pain. Pt has ortho appt today at 1 pm. Pt has TCC appt ernie with Prudence Gonzalez. Will meet pt after this appt.

## 2017-03-14 ENCOUNTER — TELEPHONE (OUTPATIENT)
Dept: ORTHOPEDICS | Facility: CLINIC | Age: 72
End: 2017-03-14

## 2017-03-14 ENCOUNTER — HOSPITAL ENCOUNTER (INPATIENT)
Facility: HOSPITAL | Age: 72
LOS: 8 days | Discharge: HOME HEALTH | DRG: 559 | End: 2017-03-23
Attending: EMERGENCY MEDICINE | Admitting: HOSPITALIST
Payer: MEDICARE

## 2017-03-14 ENCOUNTER — OFFICE VISIT (OUTPATIENT)
Dept: FAMILY MEDICINE | Facility: CLINIC | Age: 72
DRG: 559 | End: 2017-03-14
Payer: MEDICARE

## 2017-03-14 VITALS
OXYGEN SATURATION: 96 % | TEMPERATURE: 98 F | SYSTOLIC BLOOD PRESSURE: 84 MMHG | DIASTOLIC BLOOD PRESSURE: 40 MMHG | HEART RATE: 65 BPM

## 2017-03-14 DIAGNOSIS — L03.116 CELLULITIS OF LEFT LOWER EXTREMITY: ICD-10-CM

## 2017-03-14 DIAGNOSIS — A41.9 SEPSIS: ICD-10-CM

## 2017-03-14 DIAGNOSIS — R09.89 PROMINENT AORTA: ICD-10-CM

## 2017-03-14 DIAGNOSIS — L03.114 CELLULITIS OF LEFT UPPER EXTREMITY: ICD-10-CM

## 2017-03-14 DIAGNOSIS — F33.0 MAJOR DEPRESSIVE DISORDER, RECURRENT EPISODE, MILD: ICD-10-CM

## 2017-03-14 DIAGNOSIS — L97.509 FOOT ULCER, UNSPECIFIED LATERALITY, WITH UNSPECIFIED SEVERITY: ICD-10-CM

## 2017-03-14 DIAGNOSIS — A41.9 SEPSIS, DUE TO UNSPECIFIED ORGANISM: ICD-10-CM

## 2017-03-14 DIAGNOSIS — F41.9 ANXIETY: ICD-10-CM

## 2017-03-14 DIAGNOSIS — L97.529 NON-PRESSURE CHRONIC ULCER OF OTHER PART OF LEFT FOOT WITH UNSPECIFIED SEVERITY: ICD-10-CM

## 2017-03-14 DIAGNOSIS — I25.10 CORONARY ARTERY DISEASE INVOLVING NATIVE CORONARY ARTERY OF NATIVE HEART WITHOUT ANGINA PECTORIS: ICD-10-CM

## 2017-03-14 DIAGNOSIS — I95.9 HYPOTENSION, UNSPECIFIED HYPOTENSION TYPE: Primary | ICD-10-CM

## 2017-03-14 DIAGNOSIS — I10 ESSENTIAL HYPERTENSION: ICD-10-CM

## 2017-03-14 DIAGNOSIS — L03.116 CELLULITIS OF LEFT LEG: ICD-10-CM

## 2017-03-14 DIAGNOSIS — L03.90 CELLULITIS: Primary | ICD-10-CM

## 2017-03-14 DIAGNOSIS — E66.01 MORBID OBESITY WITH BMI OF 40.0-44.9, ADULT: ICD-10-CM

## 2017-03-14 DIAGNOSIS — Z88.3: ICD-10-CM

## 2017-03-14 DIAGNOSIS — E78.5 HYPERLIPIDEMIA, UNSPECIFIED HYPERLIPIDEMIA TYPE: ICD-10-CM

## 2017-03-14 DIAGNOSIS — K21.9 GASTROESOPHAGEAL REFLUX DISEASE, ESOPHAGITIS PRESENCE NOT SPECIFIED: ICD-10-CM

## 2017-03-14 PROCEDURE — 96365 THER/PROPH/DIAG IV INF INIT: CPT

## 2017-03-14 PROCEDURE — 99499 UNLISTED E&M SERVICE: CPT | Mod: S$GLB,,, | Performed by: NURSE PRACTITIONER

## 2017-03-14 PROCEDURE — 96367 TX/PROPH/DG ADDL SEQ IV INF: CPT

## 2017-03-14 PROCEDURE — 99999 PR PBB SHADOW E&M-EST. PATIENT-LVL IV: CPT | Mod: PBBFAC,,, | Performed by: NURSE PRACTITIONER

## 2017-03-14 PROCEDURE — 99285 EMERGENCY DEPT VISIT HI MDM: CPT | Mod: ,,, | Performed by: EMERGENCY MEDICINE

## 2017-03-14 PROCEDURE — 83605 ASSAY OF LACTIC ACID: CPT

## 2017-03-14 PROCEDURE — 85025 COMPLETE CBC W/AUTO DIFF WBC: CPT

## 2017-03-14 PROCEDURE — 80053 COMPREHEN METABOLIC PANEL: CPT

## 2017-03-14 PROCEDURE — 86140 C-REACTIVE PROTEIN: CPT

## 2017-03-14 PROCEDURE — 85651 RBC SED RATE NONAUTOMATED: CPT

## 2017-03-14 PROCEDURE — 96366 THER/PROPH/DIAG IV INF ADDON: CPT

## 2017-03-14 PROCEDURE — 87040 BLOOD CULTURE FOR BACTERIA: CPT

## 2017-03-14 PROCEDURE — 99285 EMERGENCY DEPT VISIT HI MDM: CPT | Mod: 25

## 2017-03-14 RX ORDER — MORPHINE SULFATE 2 MG/ML
2 INJECTION, SOLUTION INTRAMUSCULAR; INTRAVENOUS
Status: COMPLETED | OUTPATIENT
Start: 2017-03-14 | End: 2017-03-15

## 2017-03-14 RX ORDER — MUPIROCIN 20 MG/G
OINTMENT TOPICAL
Refills: 3 | Status: ON HOLD | COMMUNITY
Start: 2016-12-15 | End: 2017-03-15
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
## 2017-03-14 NOTE — MR AVS SNAPSHOT
[facility]  [address] [place] [address]  Phone: [phone]  Fax: [phone]                  Janusz Montgomery Jr.   3/14/2017 1:20 PM   Office Visit    Description:  Male : 1945   Provider:  WINIFRED De La Rosa   Department:  [facility]           Reason for Visit     Transitional Care           Diagnoses this Visit        Comments    Hypotension, unspecified hypotension type    -  Primary     Cellulitis of left lower extremity         Foot ulcer, unspecified laterality, with unspecified severity                To Do List           Future Appointments        Provider Department Dept Phone    3/21/2017 9:00 AM MD Reji Maravilla elia - Orthopedics [phone]    3/28/2017 1:00 PM KAMAR Fierro elia - Infectious Diseases [phone]    4/3/2017 8:00 AM [facility], [place] [facility] [phone]    2017 2:00 PM Miguelina Weathers MD [facility] [phone]      Goals (5 Years of Data)     None      [facility] On Call     [facility] On [facility] Nurse Care Line -  Assistance  Registered nurses in the [facility] provide clinical advisement, health education, appointment booking, and other advisory services.  Call for this free service at [phone].             Medications           Message regarding Medications     Verify the changes and/or additions to your medication regime listed below are the same as discussed with your clinician today.  If any of these changes or additions are incorrect, please notify your healthcare provider.        STOP taking these medications     hydrOXYzine HCl (ATARAX) 25 MG tablet Take 1 tablet (25 mg total) by mouth 3 (three) times daily as needed for Itching.           Verify that the below list of medications is an accurate representation of the medications you are currently taking.  If none reported, the list may be blank. If incorrect, please contact your healthcare  provider. Carry this list with you in case of emergency.           Current Medications     artificial tears (ISOPTO TEARS) 0.5 % ophthalmic solution Place 1 drop into both eyes as needed.    atorvastatin (LIPITOR) 40 MG tablet TAKE ONE TABLET BY MOUTH ONCE DAILY    collagenase ointment Apply topically every Mon, Wed, Fri. Nursing to apply Santyl  nickel thick to wound bed and edges and cover with damp gauze (to activate santyl) daily and cover with telfa island dressing (mepore) over each pressure injury. Santyl has autolytic debridement properties and will promote healing.    CYANOCOBALAMIN, VITAMIN B-12, (VITAMIN B-12 ORAL) Take 2,500 mcg by mouth once daily.    diclofenac sodium 1 % Gel Apply 2 g topically once daily. Apply 2 g to left shoulder    gabapentin (NEURONTIN) 300 MG capsule Take 1 capsule (300 mg total) by mouth 3 (three) times daily.    melatonin 3 mg Tab Take 6 mg by mouth nightly as needed.    metoprolol tartrate (LOPRESSOR) 25 MG tablet Take 1 tablet (25 mg total) by mouth 2 (two) times daily.    mupirocin (BACTROBAN) 2 % ointment APPLY TO RIGHT LEG 2 TIMES A DAY AFTER CLEANSING.    nitroGLYCERIN (NITROSTAT) 0.4 MG SL tablet Place 1 tablet (0.4 mg total) under the tongue every 5 (five) minutes as needed for Chest pain.    omeprazole (PRILOSEC) 20 MG capsule Take 2 capsules (40 mg total) by mouth once daily.    oxycodone-acetaminophen (PERCOCET)  mg per tablet Take 1 tablet by mouth every 4 to 6 hours as needed for Pain.    polyethylene glycol (GLYCOLAX) 17 gram PwPk Take 17 g by mouth once daily.    ramipril (ALTACE) 5 MG capsule TAKE ONE CAPSULE BY MOUTH ONCE DAILY    senna-docusate 8.6-50 mg (PERICOLACE) 8.6-50 mg per tablet Take 1 tablet by mouth 2 (two) times daily.    tamsulosin (FLOMAX) 0.4 mg Cp24 Take 1 capsule (0.4 mg total) by mouth once daily.    torsemide (DEMADEX) 20 MG Tab Take 2 tablets (40 mg total) by mouth once daily. 1 tablet (20 mg) daily for 3 days.    venlafaxine  (EFFEXOR) 75 MG tablet Take 2 tablets (150 mg total) by mouth 2 (two) times daily.    vitamin D 1000 units Tab Take 1 tablet (1,000 Units total) by mouth once daily.    zinc sulfate (ZINCATE) 220 (50) mg capsule Take 1 capsule (220 mg total) by mouth once daily.    aspirin 325 MG tablet Take 1 tablet (325 mg total) by mouth 2 (two) times daily.           Clinical Reference Information           Your Vitals Were     BP Pulse Temp SpO2          84/40 (BP Location: Right arm, Patient Position: Sitting, BP Method: Manual) 65 98.2 °F (36.8 °C) (Oral) 96%        Blood Pressure          Most Recent Value    BP  (!)  84/40      Allergies as of 3/14/2017     Bacitracin      Immunizations Administered on Date of Encounter - 3/14/2017     None      Language Assistance Services     ATTENTION: Language assistance services are available, free of charge. Please call [phone].      ATENCIÓN: Si habla michael, tiene a robison disposición servicios gratuitos de asistencia lingüística. Llame al [phone].     [facility] Ý: N?u b?n nói Ti?ng Vi?t, có các d?ch v? h? tr? ngôn ng? mi?n phí dành cho b?n. G?i s? [phone].         [facility] complies with applicable Federal civil rights laws and does not discriminate on the basis of race, color, national origin, age, disability, or sex.

## 2017-03-14 NOTE — TELEPHONE ENCOUNTER
"Direct call to Ortho Triage per pt 's wife stating that pt  Is en route to [facility] ED per MARY Gonzalez NP/Fam. Med for "streak that is going up' pt's leg. Noted report called to ER per Fam. Med  for further evaluation for hypotension and r/o sepsis. /Ortho On Call notified.   "

## 2017-03-14 NOTE — IP AVS SNAPSHOT
[facility]  [address]  [facility] [address]  Phone: [phone]           Patient Discharge Instructions     Our goal is to set you up for success. This packet includes information on your condition, medications, and your home care. It will help you to care for yourself so you don't get sicker and need to go back to the hospital.     Please ask your nurse if you have any questions.        There are many details to remember when preparing to leave the hospital. Here is what you will need to do:    1. Take your medicine. If you are prescribed medications, review your Medication List in the following pages. You may have new medications to  at the pharmacy and others that you'll need to stop taking. Review the instructions for how and when to take your medications. Talk with your doctor or nurses if you are unsure of what to do.     2. Go to your follow-up appointments. Specific follow-up information is listed in the following pages. Your may be contacted by a transition nurse or clinical provider about future appointments. Be sure we have all of the phone numbers to reach you, if needed. Please contact your provider's office if you are unable to make an appointment.     3. Watch for warning signs. Your doctor or nurse will give you detailed warning signs to watch for and when to call for assistance. These instructions may also include educational information about your condition. If you experience any of warning signs to your health, call your doctor.               [facility]  Unless otherwise directed by your provider, please contact [facility], our nurse care line that is available for 24/7 assistance.     [phone] (toll-free)    Registered nurses in the [facility] provide clinical advisement, health education, appointment booking, and other advisory services.                    ** Verify the list of medication(s) below is accurate and up  to date. Carry this with you in case of emergency. If your medications have changed, please notify your healthcare provider.             Medication List      START taking these medications        Additional Info                      cetirizine 10 MG tablet   Commonly known as:  ZYRTEC   Refills:  0   Dose:  10 mg    Last time this was given:  10 mg on 3/23/2017  9:18 AM   Instructions:  Take 1 tablet (10 mg total) by mouth once daily.     Begin Date    AM    Noon    PM    Bedtime       dextrose 5 % SolP 250 mL with vancomycin 1,000 mg SolR 1,500 mg   Refills:  0   Dose:  1500 mg   Indications:  Bone/Joint    Last time this was given:  1,500 mg on 3/23/2017 12:47 AM   Instructions:  Inject 1,500 mg into the vein once daily. 4/26/17=stop date.     Begin Date    AM    Noon    PM    Bedtime       diphenhydrAMINE 25 mg capsule   Commonly known as:  BENADRYL   Refills:  0   Dose:  25 mg    Last time this was given:  25 mg on 3/17/2017  9:57 PM   Instructions:  Take 1 each (25 mg total) by mouth every 6 (six) hours as needed for Itching.     Begin Date    AM    Noon    PM    Bedtime       miconazole 2 % cream   Commonly known as:  MICOTIN   Refills:  0    Last time this was given:  3/23/2017  9:37 AM   Instructions:  Apply topically 2 (two) times daily.     Begin Date    AM    Noon    PM    Bedtime       triamcinolone acetonide 0.1% 0.1 % cream   Commonly known as:  KENALOG   Refills:  0    Last time this was given:  3/23/2017  9:37 AM   Instructions:  Apply topically 2 (two) times daily.     Begin Date    AM    Noon    PM    Bedtime         CHANGE how you take these medications        Additional Info                      artificial tears 0.5 % ophthalmic solution   Commonly known as:  ISOPTO TEARS   Refills:  0   Dose:  1 drop   What changed:  reasons to take this    Instructions:  Place 1 drop into both eyes as needed.     Begin Date    AM    Noon    PM    Bedtime       aspirin 325 MG tablet   Refills:  0   Dose:  325 mg    What changed:    - when to take this  - Another medication with the same name was removed. Continue taking this medication, and follow the directions you see here.    Last time this was given:  325 mg on 3/23/2017  9:18 AM   Instructions:  Take 1 tablet (325 mg total) by mouth once daily.     Begin Date    AM    Noon    PM    Bedtime       gabapentin 300 MG capsule   Commonly known as:  NEURONTIN   Quantity:  90 capsule   Refills:  11   Dose:  300 mg   What changed:    - how much to take  - how to take this  - when to take this  - additional instructions    Last time this was given:  600 mg on 3/23/2017  3:00 PM   Instructions:  Take 1 capsule (300 mg total) by mouth 3 (three) times daily.     Begin Date    AM    Noon    PM    Bedtime         CONTINUE taking these medications        Additional Info                      acetaminophen 325 MG tablet   Commonly known as:  TYLENOL   Refills:  0   Dose:  325 mg    Instructions:  Take 325 mg by mouth every 6 (six) hours as needed for Pain.     Begin Date    AM    Noon    PM    Bedtime       atorvastatin 40 MG tablet   Commonly known as:  LIPITOR   Quantity:  90 tablet   Refills:  0    Last time this was given:  40 mg on 3/23/2017  9:18 AM   Instructions:  TAKE ONE TABLET BY MOUTH ONCE DAILY     Begin Date    AM    Noon    PM    Bedtime       collagenase ointment   Quantity:  60 g   Refills:  0    Instructions:  Apply topically every Mon, Wed, Fri. Nursing to apply Santyl  nickel thick to wound bed and edges and cover with damp gauze (to activate santyl) daily and cover with telfa island dressing (mepore) over each pressure injury. Santyl has autolytic debridement properties and will promote healing.     Begin Date    AM    Noon    PM    Bedtime       diclofenac sodium 1 % Gel   Quantity:  1 Tube   Refills:  0   Dose:  2 g    Instructions:  Apply 2 g topically once daily. Apply 2 g to left shoulder     Begin Date    AM    Noon    PM    Bedtime       melatonin 3 mg Tab    Refills:  0   Dose:  6 mg    Instructions:  Take 6 mg by mouth nightly as needed (for sleep).     Begin Date    AM    Noon    PM    Bedtime       omeprazole 20 MG capsule   Commonly known as:  PRILOSEC   Quantity:  180 capsule   Refills:  0   Dose:  40 mg    Instructions:  Take 2 capsules (40 mg total) by mouth once daily.     Begin Date    AM    Noon    PM    Bedtime       oxycodone-acetaminophen  mg per tablet   Commonly known as:  PERCOCET   Quantity:  60 tablet   Refills:  0   Dose:  1 tablet    Last time this was given:  1 tablet on 3/21/2017 11:21 PM   Instructions:  Take 1 tablet by mouth every 4 to 6 hours as needed for Pain.     Begin Date    AM    Noon    PM    Bedtime       tamsulosin 0.4 mg Cp24   Commonly known as:  FLOMAX   Quantity:  90 capsule   Refills:  3   Dose:  0.4 mg    Last time this was given:  0.4 mg on 3/23/2017  9:18 AM   Instructions:  Take 1 capsule (0.4 mg total) by mouth once daily.     Begin Date    AM    Noon    PM    Bedtime       venlafaxine 75 MG tablet   Commonly known as:  EFFEXOR   Quantity:  360 tablet   Refills:  0   Dose:  150 mg    Last time this was given:  150 mg on 3/23/2017  9:18 AM   Instructions:  Take 2 tablets (150 mg total) by mouth 2 (two) times daily.     Begin Date    AM    Noon    PM    Bedtime       VITAMIN B-12 ORAL   Refills:  0   Dose:  2500 mcg    Instructions:  Take 2,500 mcg by mouth once daily.     Begin Date    AM    Noon    PM    Bedtime       vitamin D 1000 units Tab   Refills:  0   Dose:  1000 Units    Instructions:  Take 1 tablet (1,000 Units total) by mouth once daily.     Begin Date    AM    Noon    PM    Bedtime       zinc sulfate 220 (50) mg capsule   Commonly known as:  ZINCATE   Refills:  0   Dose:  220 mg    Instructions:  Take 1 capsule (220 mg total) by mouth once daily.     Begin Date    AM    Noon    PM    Bedtime         STOP taking these medications     metoprolol tartrate 25 MG tablet   Commonly known as:  LOPRESSOR        ramipril 5 MG capsule   Commonly known as:  ALTACE       torsemide 20 MG Tab   Commonly known as:  DEMADEX            Where to Get Your Medications      You can get these medications from any pharmacy     You don't need a prescription for these medications     aspirin 325 MG tablet    cetirizine 10 MG tablet    diphenhydrAMINE 25 mg capsule    miconazole 2 % cream         Information about where to get these medications is not yet available     ! Ask your nurse or doctor about these medications     dextrose 5 % SolP 250 mL with vancomycin 1,000 mg SolR 1,500 mg    triamcinolone acetonide 0.1% 0.1 % cream                  Please bring to all follow up appointments:    1. A copy of your discharge instructions.  2. All medicines you are currently taking in their original bottles.  3. Identification and insurance card.    Please arrive 15 minutes ahead of scheduled appointment time.    Please call 24 hours in advance if you must reschedule your appointment and/or time.        Your Scheduled Appointments     Mar 28, 2017  8:30 AM CDT   Post OP with MD Reji Maravilla - Orthopedics ([address] )    [address]  [place] [address]   [phone]            Mar 28, 2017 10:00 AM CDT   Hospital Follow Up with KAVITHA Ward - [facility] Medicine ([facility])    [address]  [place] [address]   [phone]            Mar 28, 2017  1:00 PM CDT   Established Patient Visit with KAMAR Fierro - Infectious Diseases ([address] )    [address]  [place] [address]   [phone]            Apr 03, 2017  8:00 AM CDT   Fasting Lab with LAB, [facility])    [facility] [facility] [place] [address] [phone]            Apr 28, 2017  2:00 PM CDT   Established Patient Visit with Miguelina Weathers MD   [facility])    [address] [facility]  [place]  "[place] [address] [phone]              Follow-up Information     Follow up with [facility].    Specialty:  Home Health Services    Why:  Home Health     Contact information:    [address] [place] [address] [phone]          Follow up with [facility].    Specialties:  Pharmacist, DME Provider, IV Infusion    Why:  Infusion     Contact information:    [address] [place] [id number] [phone]          Follow up with BMP in 1 week after discharge.        Follow up with [facility] 3/28/2017.    Specialty:  Internal Medicine    Why:  @ 10:00    Contact information:    [address]  [facility] [id number] [phone]            Primary Diagnosis     Your primary diagnosis was:  Infection In Bloodstream      Admission Information     Date & Time Provider Department CSN    3/14/2017 10:37 PM Melani Iglesias MD [facility] [id number]      Care Providers     Provider Role Specialty Primary office phone    Melani Iglesias MD Attending Provider Hospitalist [phone]    Melani Iglesias MD Team Attending  Hospitalist [phone]    Rani Lackey DO Consulting Physician  Nephrology [phone]      Your Vitals Were     BP Pulse Temp Resp Height Weight    110/48 (BP Location: Right arm, Patient Position: Lying, BP Method: Automatic) 69 98 °F (36.7 °C) (Oral) 18 6' 1" (1.854 m) 149.5 kg (329 lb 10.8 oz)    SpO2 BMI             94% 43.5 kg/m2         Recent Lab Values        7/13/2010 3/18/2011 2/10/2012 7/27/2012 11/18/2013 3/3/2014 3/31/2015 7/11/2016      8:35 AM  8:54 AM  8:56 AM  9:25 AM  3:45 PM 10:52 AM 10:35 AM 11:29 AM    A1C 5.3 5.6 5.5 5.4 5.5 5.6 5.3 5.3    Comment for A1C at 11:29 AM on 7/11/2016:  According to ADA guidelines, hemoglobin A1C <7.0% represents  optimal control in non-pregnant diabetic patients.  Different  metrics may apply to specific populations.   Standards of Medical Care in Diabetes - " 2016.  For the purpose of screening for the presence of diabetes:  <5.7%     Consistent with the absence of diabetes  5.7-6.4%  Consistent with increasing risk for diabetes   (prediabetes)  >or=6.5%  Consistent with diabetes  Currently no consensus exists for use of hemoglobin A1C  for diagnosis of diabetes for children.        Pending Labs     Order Current Status    Human Herpesvirus-8 Ab, IgG, Serum In process    Vancomycin, random In process      Allergies as of 3/23/2017        Reactions    Ciprofloxacin Rash    Diffuse pruritic morbilliform rash developed 3/15/2017 after dose of cipro; previously in 2/2017 he had rash/fevers after initiation of cipro    Zosyn [Piperacillin-tazobactam] Anaphylaxis    Diffuse pruritic morbilliform rash developed 3/15/2017.  Then, 430am dose on 3/16 and rash worsened with SOB/tachypnea but no hypoxemia.     Bacitracin Itching, Rash    Violaceous rash in area of topical Tx.       Advance Directives     An advance directive is a document which, in the event you are no longer able to make decisions for yourself, tells your healthcare team what kind of treatment you do or do not want to receive, or who you would like to make those decisions for you.  If you do not currently have an advance directive, [facility] encourages you to create one.  For more information call:  [phone]), [phone] ([phone]),  or log on to [email or web address].        Language Assistance Services     ATTENTION: Language assistance services are available, free of charge. Please call [phone].      ATENCIÓN: Si habla español, tiene a robison disposición servicios gratuitos de asistencia lingüística. Llame al [phone].     ANGIE Ý: N?u b?n nói Ti?ng Vi?t, có các d?ch v? h? tr? ngôn ng? mi?n phí dành cho b?n. G?i s? [phone].         [facility] complies with applicable Federal civil rights laws and does not discriminate on the basis of race, color,  national origin, age, disability, or sex.

## 2017-03-15 PROBLEM — A41.9 SEPSIS: Status: ACTIVE | Noted: 2017-03-15

## 2017-03-15 PROBLEM — L03.90 CELLULITIS: Status: ACTIVE | Noted: 2017-03-15

## 2017-03-15 PROBLEM — R65.20 SEVERE SEPSIS: Status: ACTIVE | Noted: 2017-03-15

## 2017-03-15 LAB
ALBUMIN SERPL BCP-MCNC: 3.2 G/DL
ALP SERPL-CCNC: 127 U/L
ALT SERPL W/O P-5'-P-CCNC: 43 U/L
ANION GAP SERPL CALC-SCNC: 9 MMOL/L
AST SERPL-CCNC: 18 U/L
BASOPHILS # BLD AUTO: 0 K/UL
BASOPHILS # BLD AUTO: 0.06 K/UL
BASOPHILS NFR BLD: 0 %
BASOPHILS NFR BLD: 0.5 %
BILIRUB SERPL-MCNC: 0.8 MG/DL
BILIRUB UR QL STRIP: NEGATIVE
BUN SERPL-MCNC: 21 MG/DL
CALCIUM SERPL-MCNC: 8.8 MG/DL
CHLORIDE SERPL-SCNC: 99 MMOL/L
CLARITY UR REFRACT.AUTO: CLEAR
CO2 SERPL-SCNC: 28 MMOL/L
COLOR UR AUTO: YELLOW
CREAT SERPL-MCNC: 1.2 MG/DL
CRP SERPL-MCNC: 122.4 MG/L
DIFFERENTIAL METHOD: ABNORMAL
DIFFERENTIAL METHOD: ABNORMAL
EOSINOPHIL # BLD AUTO: 0.8 K/UL
EOSINOPHIL # BLD AUTO: 1 K/UL
EOSINOPHIL NFR BLD: 6.1 %
EOSINOPHIL NFR BLD: 9.4 %
ERYTHROCYTE [DISTWIDTH] IN BLOOD BY AUTOMATED COUNT: 13 %
ERYTHROCYTE [DISTWIDTH] IN BLOOD BY AUTOMATED COUNT: 13.3 %
ERYTHROCYTE [SEDIMENTATION RATE] IN BLOOD BY WESTERGREN METHOD: 76 MM/HR
EST. GFR  (AFRICAN AMERICAN): >60 ML/MIN/1.73 M^2
EST. GFR  (NON AFRICAN AMERICAN): >60 ML/MIN/1.73 M^2
GLUCOSE SERPL-MCNC: 97 MG/DL
GLUCOSE UR QL STRIP: NEGATIVE
GRAM STN SPEC: NORMAL
GRAM STN SPEC: NORMAL
HCT VFR BLD AUTO: 34.7 %
HCT VFR BLD AUTO: 34.7 %
HGB BLD-MCNC: 11.5 G/DL
HGB BLD-MCNC: 11.7 G/DL
HGB UR QL STRIP: ABNORMAL
KETONES UR QL STRIP: NEGATIVE
LACTATE SERPL-SCNC: 0.8 MMOL/L
LEUKOCYTE ESTERASE UR QL STRIP: NEGATIVE
LYMPHOCYTES # BLD AUTO: 0.6 K/UL
LYMPHOCYTES # BLD AUTO: 3.1 K/UL
LYMPHOCYTES NFR BLD: 24.1 %
LYMPHOCYTES NFR BLD: 5.8 %
MCH RBC QN AUTO: 31.2 PG
MCH RBC QN AUTO: 31.4 PG
MCHC RBC AUTO-ENTMCNC: 33.1 %
MCHC RBC AUTO-ENTMCNC: 33.7 %
MCV RBC AUTO: 93 FL
MCV RBC AUTO: 95 FL
MICROSCOPIC COMMENT: NORMAL
MONOCYTES # BLD AUTO: 0.2 K/UL
MONOCYTES # BLD AUTO: 0.9 K/UL
MONOCYTES NFR BLD: 2.3 %
MONOCYTES NFR BLD: 6.9 %
NEUTROPHILS # BLD AUTO: 7.9 K/UL
NEUTROPHILS # BLD AUTO: 8.8 K/UL
NEUTROPHILS NFR BLD: 62.2 %
NEUTROPHILS NFR BLD: 82.4 %
NITRITE UR QL STRIP: NEGATIVE
PH UR STRIP: 6 [PH] (ref 5–8)
PLATELET # BLD AUTO: 196 K/UL
PLATELET # BLD AUTO: 224 K/UL
PMV BLD AUTO: 10.1 FL
PMV BLD AUTO: 10.2 FL
POTASSIUM SERPL-SCNC: 3.7 MMOL/L
PROCALCITONIN SERPL IA-MCNC: <0.09 NG/ML
PROT SERPL-MCNC: 7.2 G/DL
PROT UR QL STRIP: NEGATIVE
RBC # BLD AUTO: 3.66 M/UL
RBC # BLD AUTO: 3.75 M/UL
RBC #/AREA URNS AUTO: 1 /HPF (ref 0–4)
SODIUM SERPL-SCNC: 136 MMOL/L
SP GR UR STRIP: 1.01 (ref 1–1.03)
URN SPEC COLLECT METH UR: ABNORMAL
UROBILINOGEN UR STRIP-ACNC: NEGATIVE EU/DL
WBC # BLD AUTO: 10.62 K/UL
WBC # BLD AUTO: 12.63 K/UL
WBC #/AREA URNS AUTO: 1 /HPF (ref 0–5)

## 2017-03-15 PROCEDURE — 25000003 PHARM REV CODE 250

## 2017-03-15 PROCEDURE — 84145 PROCALCITONIN (PCT): CPT

## 2017-03-15 PROCEDURE — 99223 1ST HOSP IP/OBS HIGH 75: CPT | Mod: ,,, | Performed by: INTERNAL MEDICINE

## 2017-03-15 PROCEDURE — 81001 URINALYSIS AUTO W/SCOPE: CPT

## 2017-03-15 PROCEDURE — 85025 COMPLETE CBC W/AUTO DIFF WBC: CPT

## 2017-03-15 PROCEDURE — 63600175 PHARM REV CODE 636 W HCPCS

## 2017-03-15 PROCEDURE — 87205 SMEAR GRAM STAIN: CPT

## 2017-03-15 PROCEDURE — 36415 COLL VENOUS BLD VENIPUNCTURE: CPT

## 2017-03-15 PROCEDURE — 25000003 PHARM REV CODE 250: Performed by: INTERNAL MEDICINE

## 2017-03-15 PROCEDURE — 99223 1ST HOSP IP/OBS HIGH 75: CPT | Mod: AI,GC,, | Performed by: HOSPITALIST

## 2017-03-15 PROCEDURE — 87070 CULTURE OTHR SPECIMN AEROBIC: CPT

## 2017-03-15 PROCEDURE — 11000001 HC ACUTE MED/SURG PRIVATE ROOM

## 2017-03-15 PROCEDURE — 87086 URINE CULTURE/COLONY COUNT: CPT

## 2017-03-15 PROCEDURE — 87040 BLOOD CULTURE FOR BACTERIA: CPT

## 2017-03-15 RX ORDER — OXYCODONE HYDROCHLORIDE 5 MG/1
5 TABLET ORAL EVERY 4 HOURS PRN
Status: DISCONTINUED | OUTPATIENT
Start: 2017-03-15 | End: 2017-03-15

## 2017-03-15 RX ORDER — OXYCODONE AND ACETAMINOPHEN 10; 325 MG/1; MG/1
1 TABLET ORAL EVERY 4 HOURS PRN
Status: DISCONTINUED | OUTPATIENT
Start: 2017-03-15 | End: 2017-03-23 | Stop reason: HOSPADM

## 2017-03-15 RX ORDER — SODIUM CHLORIDE 0.9 % (FLUSH) 0.9 %
3 SYRINGE (ML) INJECTION EVERY 8 HOURS
Status: DISCONTINUED | OUTPATIENT
Start: 2017-03-15 | End: 2017-03-23 | Stop reason: HOSPADM

## 2017-03-15 RX ORDER — ATORVASTATIN CALCIUM 20 MG/1
40 TABLET, FILM COATED ORAL DAILY
Status: DISCONTINUED | OUTPATIENT
Start: 2017-03-15 | End: 2017-03-23 | Stop reason: HOSPADM

## 2017-03-15 RX ORDER — GABAPENTIN 300 MG/1
300 CAPSULE ORAL DAILY
Status: DISCONTINUED | OUTPATIENT
Start: 2017-03-16 | End: 2017-03-17

## 2017-03-15 RX ORDER — ONDANSETRON 2 MG/ML
4 INJECTION INTRAMUSCULAR; INTRAVENOUS EVERY 12 HOURS PRN
Status: CANCELLED | OUTPATIENT
Start: 2017-03-15

## 2017-03-15 RX ORDER — ASPIRIN 325 MG
325 TABLET ORAL 2 TIMES DAILY
Status: ON HOLD | COMMUNITY
End: 2017-03-20 | Stop reason: HOSPADM

## 2017-03-15 RX ORDER — ASPIRIN 325 MG
325 TABLET ORAL DAILY
Status: DISCONTINUED | OUTPATIENT
Start: 2017-03-15 | End: 2017-03-23 | Stop reason: HOSPADM

## 2017-03-15 RX ORDER — CIPROFLOXACIN 250 MG/1
750 TABLET, FILM COATED ORAL EVERY 12 HOURS
Status: DISCONTINUED | OUTPATIENT
Start: 2017-03-15 | End: 2017-03-15

## 2017-03-15 RX ORDER — GABAPENTIN 300 MG/1
600 CAPSULE ORAL 2 TIMES DAILY
Status: DISCONTINUED | OUTPATIENT
Start: 2017-03-15 | End: 2017-03-23 | Stop reason: HOSPADM

## 2017-03-15 RX ORDER — VENLAFAXINE 37.5 MG/1
150 TABLET ORAL 2 TIMES DAILY
Status: DISCONTINUED | OUTPATIENT
Start: 2017-03-15 | End: 2017-03-23 | Stop reason: HOSPADM

## 2017-03-15 RX ORDER — ACETAMINOPHEN 325 MG/1
325 TABLET ORAL EVERY 6 HOURS PRN
Status: ON HOLD | COMMUNITY
End: 2017-04-14 | Stop reason: HOSPADM

## 2017-03-15 RX ORDER — OXYCODONE HYDROCHLORIDE 5 MG/1
10 TABLET ORAL EVERY 4 HOURS PRN
Status: DISCONTINUED | OUTPATIENT
Start: 2017-03-15 | End: 2017-03-15

## 2017-03-15 RX ORDER — ASPIRIN 325 MG
325 TABLET ORAL 2 TIMES DAILY
Status: DISCONTINUED | OUTPATIENT
Start: 2017-03-15 | End: 2017-03-15

## 2017-03-15 RX ORDER — DIPHENHYDRAMINE HCL 25 MG
25 CAPSULE ORAL EVERY 6 HOURS PRN
Status: DISCONTINUED | OUTPATIENT
Start: 2017-03-15 | End: 2017-03-16

## 2017-03-15 RX ORDER — GABAPENTIN 300 MG/1
300 CAPSULE ORAL 3 TIMES DAILY
Status: DISCONTINUED | OUTPATIENT
Start: 2017-03-15 | End: 2017-03-15

## 2017-03-15 RX ORDER — MORPHINE SULFATE 2 MG/ML
4 INJECTION, SOLUTION INTRAMUSCULAR; INTRAVENOUS EVERY 4 HOURS PRN
Status: CANCELLED | OUTPATIENT
Start: 2017-03-15

## 2017-03-15 RX ORDER — HYDROCODONE BITARTRATE AND ACETAMINOPHEN 5; 325 MG/1; MG/1
1 TABLET ORAL EVERY 4 HOURS PRN
Status: CANCELLED | OUTPATIENT
Start: 2017-03-15

## 2017-03-15 RX ORDER — OXYCODONE AND ACETAMINOPHEN 5; 325 MG/1; MG/1
1 TABLET ORAL EVERY 4 HOURS PRN
Status: DISCONTINUED | OUTPATIENT
Start: 2017-03-15 | End: 2017-03-23 | Stop reason: HOSPADM

## 2017-03-15 RX ORDER — POLYETHYLENE GLYCOL 3350 17 G/17G
17 POWDER, FOR SOLUTION ORAL DAILY
Status: DISCONTINUED | OUTPATIENT
Start: 2017-03-15 | End: 2017-03-23 | Stop reason: HOSPADM

## 2017-03-15 RX ORDER — CIPROFLOXACIN 500 MG/1
500 TABLET ORAL EVERY 12 HOURS
Status: DISCONTINUED | OUTPATIENT
Start: 2017-03-15 | End: 2017-03-15

## 2017-03-15 RX ORDER — TAMSULOSIN HYDROCHLORIDE 0.4 MG/1
0.4 CAPSULE ORAL DAILY
Status: DISCONTINUED | OUTPATIENT
Start: 2017-03-15 | End: 2017-03-23 | Stop reason: HOSPADM

## 2017-03-15 RX ORDER — ACETAMINOPHEN 325 MG/1
650 TABLET ORAL EVERY 6 HOURS PRN
Status: DISCONTINUED | OUTPATIENT
Start: 2017-03-15 | End: 2017-03-15

## 2017-03-15 RX ORDER — PANTOPRAZOLE SODIUM 40 MG/1
40 TABLET, DELAYED RELEASE ORAL DAILY
Status: DISCONTINUED | OUTPATIENT
Start: 2017-03-15 | End: 2017-03-22

## 2017-03-15 RX ORDER — BISACODYL 10 MG
10 SUPPOSITORY, RECTAL RECTAL DAILY PRN
Status: DISCONTINUED | OUTPATIENT
Start: 2017-03-15 | End: 2017-03-23 | Stop reason: HOSPADM

## 2017-03-15 RX ADMIN — VENLAFAXINE 150 MG: 37.5 TABLET ORAL at 10:03

## 2017-03-15 RX ADMIN — Medication 3 ML: at 06:03

## 2017-03-15 RX ADMIN — VANCOMYCIN HYDROCHLORIDE 2250 MG: 1 INJECTION, POWDER, LYOPHILIZED, FOR SOLUTION INTRAVENOUS at 02:03

## 2017-03-15 RX ADMIN — VANCOMYCIN HYDROCHLORIDE 1750 MG: 1 INJECTION, POWDER, LYOPHILIZED, FOR SOLUTION INTRAVENOUS at 06:03

## 2017-03-15 RX ADMIN — OXYCODONE AND ACETAMINOPHEN 1 TABLET: 10; 325 TABLET ORAL at 02:03

## 2017-03-15 RX ADMIN — ATORVASTATIN CALCIUM 40 MG: 20 TABLET, FILM COATED ORAL at 10:03

## 2017-03-15 RX ADMIN — ASPIRIN 325 MG ORAL TABLET 325 MG: 325 PILL ORAL at 10:03

## 2017-03-15 RX ADMIN — CIPROFLOXACIN HYDROCHLORIDE 500 MG: 500 TABLET, FILM COATED ORAL at 10:03

## 2017-03-15 RX ADMIN — MORPHINE SULFATE 2 MG: 2 INJECTION, SOLUTION INTRAMUSCULAR; INTRAVENOUS at 12:03

## 2017-03-15 RX ADMIN — TAMSULOSIN HYDROCHLORIDE 0.4 MG: 0.4 CAPSULE ORAL at 10:03

## 2017-03-15 RX ADMIN — OXYCODONE HYDROCHLORIDE 10 MG: 5 TABLET ORAL at 02:03

## 2017-03-15 RX ADMIN — PIPERACILLIN SODIUM, TAZOBACTAM SODIUM 4.5 G: 4; .5 INJECTION, POWDER, LYOPHILIZED, FOR SOLUTION INTRAVENOUS at 11:03

## 2017-03-15 RX ADMIN — OXYCODONE HYDROCHLORIDE 10 MG: 5 TABLET ORAL at 10:03

## 2017-03-15 RX ADMIN — VENLAFAXINE 150 MG: 37.5 TABLET ORAL at 09:03

## 2017-03-15 RX ADMIN — OXYCODONE AND ACETAMINOPHEN 1 TABLET: 10; 325 TABLET ORAL at 09:03

## 2017-03-15 RX ADMIN — DIPHENHYDRAMINE HYDROCHLORIDE 25 MG: 25 CAPSULE ORAL at 04:03

## 2017-03-15 RX ADMIN — PIPERACILLIN SODIUM, TAZOBACTAM SODIUM 4.5 G: 4; .5 INJECTION, POWDER, LYOPHILIZED, FOR SOLUTION INTRAVENOUS at 09:03

## 2017-03-15 RX ADMIN — Medication 3 ML: at 09:03

## 2017-03-15 RX ADMIN — Medication 3 ML: at 02:03

## 2017-03-15 RX ADMIN — GABAPENTIN 300 MG: 300 CAPSULE ORAL at 01:03

## 2017-03-15 RX ADMIN — GABAPENTIN 300 MG: 300 CAPSULE ORAL at 06:03

## 2017-03-15 RX ADMIN — GABAPENTIN 600 MG: 300 CAPSULE ORAL at 09:03

## 2017-03-15 RX ADMIN — PANTOPRAZOLE SODIUM 40 MG: 40 TABLET, DELAYED RELEASE ORAL at 10:03
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
## 2017-03-15 NOTE — PROGRESS NOTES
Consult received on patient. Recommend following pin site care per Ortho. Stage 3 pressure injury noted to left heel. See flow sheet and picture below. Recommend continuing patient home regimen for wound care to left heel. Per patient and wife, every MWF santyl and kirsten is applied to wound. Wife states she will bring patient's home supplies to hospital in morning. Wound dressed with medihoney and gauze. Keep heel elevated with pillows. Discussed recommendations with Dr. Akila Marino with IM1, orders placed for nursing. Nursing to continue care.     03/15/17 1431       Pressure Ulcer 12/28/16 Left heel Stage IV   Date First Assessed: 12/28/16   Pressure Ulcer Present on Admission: (c) yes  Side: Left  Location: heel  Staging: Stage IV   Staging Stage III   Pressure Ulcer Risk Factors activity;mobility   Drainage Amount none   Drainage Characteristics/Odor no odor   Appearance moist;reddened;slough  (scattered areas of yellow slough and fibrinous tissue)   Periwound Area redness   Wound Edges open   Wound Length (cm) 4   Wound Width (cm) 2   Depth (cm) 0.3   Cleansed W/ sterile normal saline   Dressing honey dressing       Left heel    Consent was received from the patient for the wound photograph.

## 2017-03-15 NOTE — SUBJECTIVE & OBJECTIVE
Interval History: Patient overnight developed diffuse erythematous rash on skin.  Cirpofloxacin was considered a possible allergic reaction, where it was discontinued.  Patient in AM with increasing erythematous lesions around body and face.  Patient was noted to be tachycardic to 120s, where EKG showed sinus tachycardia.  Zosyn was stopped, and patient was placed on IV benadryl q 6 hours for possible drug reaction/anyphylaxis.  Patient seen later in AM with improvement of erythema/itching.  Notes pain well controlled with current pain regimen.      Review of Systems   Constitutional: Negative for chills, diaphoresis, fatigue and fever.   HENT: Negative.    Eyes: Negative for photophobia and visual disturbance.   Respiratory: Negative for cough, shortness of breath and wheezing.    Cardiovascular: Negative for chest pain, palpitations and leg swelling.   Gastrointestinal: Negative for abdominal distention, constipation, diarrhea, nausea and vomiting.   Endocrine: Negative.    Genitourinary: Negative for dysuria and hematuria.   Musculoskeletal:        Left lower extremity pain and swelling   Skin: Positive for color change and wound.        Redness and drainage   Allergic/Immunologic: Negative.    Neurological: Negative.    Hematological: Negative.    Psychiatric/Behavioral: Negative.      Objective:     Vital Signs (Most Recent):  Temp: 99.6 °F (37.6 °C) (03/16/17 1005)  Pulse: (!) 120 (03/16/17 0741)  Resp: 20 (03/16/17 0741)  BP: 139/60 (03/16/17 0741)  SpO2: 97 % (03/16/17 0741) Vital Signs (24h Range):  Temp:  [98.4 °F (36.9 °C)-100.2 °F (37.9 °C)] 99.6 °F (37.6 °C)  Pulse:  [] 120  Resp:  [18-20] 20  SpO2:  [93 %-99 %] 97 %  BP: (105-139)/(46-60) 139/60     Weight: (!) 149.7 kg (330 lb)  Body mass index is 43.54 kg/(m^2).  No intake or output data in the 24 hours ending 03/16/17 1447     Physical Exam   Constitutional: He is oriented to person, place, and time. He appears well-developed and  well-nourished.   HENT:   Head: Normocephalic and atraumatic.   Eyes: EOM are normal. Pupils are equal, round, and reactive to light.   Neck: Normal range of motion.   Cardiovascular: Normal rate, regular rhythm, normal heart sounds and intact distal pulses.    Pulmonary/Chest: Effort normal and breath sounds normal.   Abdominal: Soft. Bowel sounds are normal. He exhibits no distension. There is no tenderness.   Neurological: He is alert and oriented to person, place, and time.   Skin:   Erythema and swelling on left lower extremity.  In AM, patient had diffuse, erythematous rash all over limbs and torso, with erythematous lesions on L side of face.  Much improved after IV benadryl.  New onset purple demarcated lesion along external fixator pin on L foot        Significant Labs:   Recent Results (from the past 24 hour(s))   Aerobic culture    Collection Time: 03/15/17  3:17 PM   Result Value Ref Range    Aerobic Bacterial Culture No growth    Gram stain    Collection Time: 03/15/17  3:17 PM   Result Value Ref Range    Gram Stain Result No WBC's     Gram Stain Result No organisms seen    CBC auto differential    Collection Time: 03/15/17  5:00 PM   Result Value Ref Range    WBC 10.62 3.90 - 12.70 K/uL    RBC 3.66 (L) 4.60 - 6.20 M/uL    Hemoglobin 11.5 (L) 14.0 - 18.0 g/dL    Hematocrit 34.7 (L) 40.0 - 54.0 %    MCV 95 82 - 98 fL    MCH 31.4 (H) 27.0 - 31.0 pg    MCHC 33.1 32.0 - 36.0 %    RDW 13.3 11.5 - 14.5 %    Platelets 196 150 - 350 K/uL    MPV 10.2 9.2 - 12.9 fL    Gran # 8.8 (H) 1.8 - 7.7 K/uL    Lymph # 0.6 (L) 1.0 - 4.8 K/uL    Mono # 0.2 (L) 0.3 - 1.0 K/uL    Eos # 1.0 (H) 0.0 - 0.5 K/uL    Baso # 0.00 0.00 - 0.20 K/uL    Gran% 82.4 (H) 38.0 - 73.0 %    Lymph% 5.8 (L) 18.0 - 48.0 %    Mono% 2.3 (L) 4.0 - 15.0 %    Eosinophil% 9.4 (H) 0.0 - 8.0 %    Basophil% 0.0 0.0 - 1.9 %    Differential Method Automated    Basic metabolic panel    Collection Time: 03/16/17  4:32 AM   Result Value Ref Range    Sodium 136  136 - 145 mmol/L    Potassium 4.0 3.5 - 5.1 mmol/L    Chloride 99 95 - 110 mmol/L    CO2 25 23 - 29 mmol/L    Glucose 118 (H) 70 - 110 mg/dL    BUN, Bld 12 8 - 23 mg/dL    Creatinine 1.1 0.5 - 1.4 mg/dL    Calcium 8.6 (L) 8.7 - 10.5 mg/dL    Anion Gap 12 8 - 16 mmol/L    eGFR if African American >60.0 >60 mL/min/1.73 m^2    eGFR if non African American >60.0 >60 mL/min/1.73 m^2   CBC auto differential    Collection Time: 03/16/17  4:32 AM   Result Value Ref Range    WBC 11.88 3.90 - 12.70 K/uL    RBC 3.93 (L) 4.60 - 6.20 M/uL    Hemoglobin 12.2 (L) 14.0 - 18.0 g/dL    Hematocrit 36.1 (L) 40.0 - 54.0 %    MCV 92 82 - 98 fL    MCH 31.0 27.0 - 31.0 pg    MCHC 33.8 32.0 - 36.0 %    RDW 13.3 11.5 - 14.5 %    Platelets 235 150 - 350 K/uL    MPV 10.5 9.2 - 12.9 fL    Gran # 9.6 (H) 1.8 - 7.7 K/uL    Lymph # 0.7 (L) 1.0 - 4.8 K/uL    Mono # 0.4 0.3 - 1.0 K/uL    Eos # 1.1 (H) 0.0 - 0.5 K/uL    Baso # 0.01 0.00 - 0.20 K/uL    Gran% 80.9 (H) 38.0 - 73.0 %    Lymph% 6.1 (L) 18.0 - 48.0 %    Mono% 3.3 (L) 4.0 - 15.0 %    Eosinophil% 9.4 (H) 0.0 - 8.0 %    Basophil% 0.1 0.0 - 1.9 %    Differential Method Automated          Significant Imaging: All imaging reviewed

## 2017-03-15 NOTE — CONSULTS
[facility]  Infectious Disease  Consult Note    Patient Name: Janusz Montgomery Jr.  MRN: [id number]  Admission Date: 3/14/2017  Hospital Length of Stay: 0 days  Attending Physician: Bhavana Hansen MD  Primary Care Provider: Miguelina Weathers MD     Isolation Status: No active isolations        Inpatient consult to Infectious Diseases  Consult performed by: TRISTAN ZAMBRANO  Consult ordered by: GHADA BURGOS Consult acknowledged.  Cellulitis of leg and possible orthopedic hardware infection. On vanc and zosyn.    Full consult and recommendations to follow today.  Continue current antibiotics.     In the interim, please call for any immediate concerns.     Thank you.   JIMMY Blackmon, ANP-C  [phone] pager,  [phone]

## 2017-03-15 NOTE — SUBJECTIVE & OBJECTIVE
Past Medical History:   Diagnosis Date    ALLERGIC RHINITIS     Anemia     Anxiety     Basal cell carcinoma     forehead    Basal cell carcinoma     left eylid    Basal cell carcinoma 08/18/2014    left prox cheek    Basal cell carcinoma 9/13/13    Right anterior shoulder    Basal cell carcinoma     Coronary artery disease involving native coronary artery of native heart without angina pectoris s/p RCA stent     Cortical cataract of both eyes 3/18/2016    Depression     Erectile dysfunction 3/24/2014    Essential hypertension     GERD (gastroesophageal reflux disease) 7/25/2012    Gout, arthritis     Grade III open fracture of left tibia and fibula s/p ex-fix on 7/1/16 and ORIF of left tibia on 7/15 7/6/2016    H/O: iritis     Helicobacter pylori (H. pylori) infection     Treated    Herpes simplex keratoconjunctivitis 9/30/2015    - on acyclovir - followed by opthalmology, Dr. Uribe     Hyperkalemia 2/28/2017    Hyperlipidemia     Hypogonadism male     Mixed anxiety and depressive disorder     Morbid obesity     Obstructive sleep apnea on CPAP     CPAP    Osteoarthritis of left knee 7/25/2012    Paroxysmal atrial fibrillation 7/6/2016    Prostate cancer 2/15/2016    - followed by urology, Dr. 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 Conteh    CATARACT EXTRACTION W/  INTRAOCULAR LENS IMPLANT Left 4/12/2016        EXTERNAL FIXATION TIBIAL FRACTURE Left 07/01/2016    ORIF TIBIA FRACTURE Left 07/15/2016    Squamous cell cancer removal x3 with Mohs surgery      TONSILLECTOMY      TOTAL KNEE ARTHROPLASTY  10/2012    trus/bx         Review of patient's allergies indicates:   Allergen  Reactions    Bacitracin Hives and Itching       Medications:  Prescriptions Prior to Admission   Medication Sig    acetaminophen (TYLENOL) 325 MG tablet Take 325 mg by mouth every 6 (six) hours as needed for Pain.    artificial tears (ISOPTO TEARS) 0.5 % ophthalmic solution Place 1 drop into both eyes as needed. (Patient taking differently: Place 1 drop into both eyes as needed (for dry eyes). )    aspirin 325 MG tablet Take 325 mg by mouth 2 (two) times daily.    atorvastatin (LIPITOR) 40 MG tablet TAKE ONE TABLET BY MOUTH ONCE DAILY    collagenase ointment Apply topically every Mon, Wed, Fri. Nursing to apply Santyl  nickel thick to wound bed and edges and cover with damp gauze (to activate santyl) daily and cover with telfa island dressing (mepore) over each pressure injury. Santyl has autolytic debridement properties and will promote healing.    CYANOCOBALAMIN, VITAMIN B-12, (VITAMIN B-12 ORAL) Take 2,500 mcg by mouth once daily.    diclofenac sodium 1 % Gel Apply 2 g topically once daily. Apply 2 g to left shoulder    gabapentin (NEURONTIN) 300 MG capsule Take 1 capsule (300 mg total) by mouth 3 (three) times daily. (Patient taking differently: Take two capsules in the morning, one capsule at noon and two capsules in the evening)    melatonin 3 mg Tab Take 6 mg by mouth nightly as needed (for sleep).     metoprolol tartrate (LOPRESSOR) 25 MG tablet Take 1 tablet (25 mg total) by mouth 2 (two) times daily.    omeprazole (PRILOSEC) 20 MG capsule Take 2 capsules (40 mg total) by mouth once daily.    oxycodone-acetaminophen (PERCOCET)  mg per tablet Take 1 tablet by mouth every 4 to 6 hours as needed for Pain.    ramipril (ALTACE) 5 MG capsule TAKE ONE CAPSULE BY MOUTH ONCE DAILY    tamsulosin (FLOMAX) 0.4 mg Cp24 Take 1 capsule (0.4 mg total) by mouth once daily.    torsemide (DEMADEX) 20 MG Tab Take 2 tablets (40 mg total) by mouth once daily. 1 tablet (20 mg) daily for 3 days. (Patient  taking differently: Take 20 mg by mouth every morning. )    venlafaxine (EFFEXOR) 75 MG tablet Take 2 tablets (150 mg total) by mouth 2 (two) times daily.    vitamin D 1000 units Tab Take 1 tablet (1,000 Units total) by mouth once daily.    zinc sulfate (ZINCATE) 220 (50) mg capsule Take 1 capsule (220 mg total) by mouth once daily.     Antibiotics     Start     Stop Route Frequency Ordered    03/15/17 1400  vancomycin (VANCOCIN) 1,750 mg in dextrose 5 % 500 mL IVPB      -- IV Every 12 hours (non-standard times) 03/15/17 0914    03/15/17 1215  piperacillin-tazobactam 4.5 g in dextrose 5 % 100 mL IVPB (ready to mix system)      -- IV Every 8 hours (non-standard times) 03/15/17 1109        Antifungals     None        Antivirals     None           Immunization History   Administered Date(s) Administered    Influenza 10/06/2008, 12/02/2011, 12/08/2012    Influenza - High Dose 12/17/2015, 10/24/2016    Influenza Split 12/08/2012    PPD Test 08/01/2016    Pneumococcal Conjugate - 13 Valent 09/30/2015    Pneumococcal Polysaccharide - 23 Valent 06/06/2014    Zoster 06/01/2009       Family History     Problem Relation (Age of Onset)    Alzheimer's disease Mother    Cancer Father, Sister, Brother    Hypertension Mother    Lung cancer Father    Peripheral vascular disease     Skin cancer Father        Social History     Social History    Marital status:      Spouse name: N/A    Number of children: N/A    Years of education: N/A     Occupational History    Retired       Social History Main Topics    Smoking status: Never Smoker    Smokeless tobacco: Never Used    Alcohol use Yes      Comment: occasionally    Drug use: No    Sexual activity: Yes     Other Topics Concern    None     Social History Narrative     Review of Systems   Constitutional: Negative for chills, diaphoresis, fatigue and fever.   HENT: Negative.  Negative for congestion, sore throat and trouble swallowing.    Eyes: Negative for  visual disturbance.   Respiratory: Negative for cough, shortness of breath and wheezing.    Cardiovascular: Positive for leg swelling. Negative for chest pain and palpitations.   Gastrointestinal: Negative for abdominal distention, constipation, diarrhea, nausea and vomiting.   Endocrine: Negative.    Genitourinary: Negative for dysuria and hematuria.   Musculoskeletal: Positive for joint swelling.        Left lower extremity pain and swelling   Skin: Positive for color change and wound.        Redness and drainage   Allergic/Immunologic: Negative.    Neurological: Negative.  Negative for dizziness, syncope and headaches.   Hematological: Negative.    Psychiatric/Behavioral: Negative.      Objective:     Vital Signs (Most Recent):  Temp: 98.4 °F (36.9 °C) (03/15/17 1638)  Pulse: 92 (03/15/17 1638)  Resp: 18 (03/15/17 1638)  BP: (!) 130/58 (03/15/17 1638)  SpO2: (!) 93 % (03/15/17 1235) Vital Signs (24h Range):  Temp:  [97.4 °F (36.3 °C)-99.2 °F (37.3 °C)] 98.4 °F (36.9 °C)  Pulse:  [] 92  Resp:  [18-20] 18  SpO2:  [93 %-100 %] 93 %  BP: (114-156)/(53-67) 130/58     Weight: (!) 149.7 kg (330 lb)  Body mass index is 43.54 kg/(m^2).    Estimated Creatinine Clearance: 87.3 mL/min (based on Cr of 1.2).    Physical Exam   Constitutional: He is oriented to person, place, and time. He appears well-developed and well-nourished. No distress.   HENT:   Head: Normocephalic and atraumatic.   Eyes: EOM are normal. No scleral icterus.   Neck: Normal range of motion.   Cardiovascular: Normal rate, regular rhythm and normal heart sounds.    Pulmonary/Chest: Effort normal and breath sounds normal. No respiratory distress.   Abdominal: Soft. Bowel sounds are normal. He exhibits no distension. There is no tenderness.   Musculoskeletal: He exhibits edema (left lower leg).   Erythema, warmth, swelling left lower extremity, external fixation in place.  No active purulence noted from visible pins. Dry crusting around pins.     Left  heel ulcer clean, no active drainage/purulence, no malodor.  Base mix granular tissue, slough/fibrous tissue.  No periwound edema/erythema.   Neurological: He is alert and oriented to person, place, and time.   Skin: Skin is warm and dry. Rash (macular rash bilateral upper extremities, upper abd ( not present on admission.  Noted later)) noted. He is not diaphoretic.   Psychiatric: He has a normal mood and affect. His behavior is normal.       Significant Labs:   Blood Culture:     Recent Labs  Lab 02/20/17  1150 02/24/17  1324 02/24/17  1334 03/14/17  2341 03/15/17  0045   LABBLOO No growth after 5 days. No growth after 5 days. No growth after 5 days. No Growth to date No Growth to date     CBC:     Recent Labs  Lab 03/14/17  2341 03/15/17  1700   WBC 12.63 10.62   HGB 11.7* 11.5*   HCT 34.7* 34.7*    196     CMP:     Recent Labs  Lab 03/14/17  2341      K 3.7   CL 99   CO2 28   GLU 97   BUN 21   CREATININE 1.2   CALCIUM 8.8   PROT 7.2   ALBUMIN 3.2*   BILITOT 0.8   ALKPHOS 127   AST 18   ALT 43   ANIONGAP 9   EGFRNONAA >60.0     Urine Culture:     Recent Labs  Lab 02/18/17  1346 02/24/17  1340   LABURIN No growth ENTEROCOCCUS FAECIUM VRE10,000 - 49,999 cfu/ml     Urine Studies:     Recent Labs  Lab 03/15/17  0105   COLORU Yellow   APPEARANCEUA Clear   PHUR 6.0   SPECGRAV 1.010   PROTEINUA Negative   GLUCUA Negative   KETONESU Negative   BILIRUBINUA Negative   OCCULTUA 1+*   NITRITE Negative   UROBILINOGEN Negative   LEUKOCYTESUR Negative   RBCUA 1   WBCUA 1       Significant Imaging: I have reviewed all pertinent imaging results/findings within the past 24 hours.

## 2017-03-15 NOTE — ASSESSMENT & PLAN NOTE
- hypotensive in clinic earlier today, BP 80s systolic, given 1L fluids and BP responded, and was 150s systolic on arrival to ED  - likely secondary to cellulitis vs infected hardware vs osteomyelitis  - X-ray of L lower limb negative for bone fractures, but noted soft tissue swelling.  Unclear at this time if patient has underlying osteomyelitis given external fixator   - blood cultures NGTD x 2 days, urine pending   - ID consulted, wound cultures from foot obtained, will continue to follow   - discontinued zosyn and ciprofloxacin due to drug reaction, will continue vancomycin for staph coverage
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
## 2017-03-15 NOTE — ED NOTES
Report received from rayne ireland rn patient transferred to room 27. Patient aaox3, vitals performed wnl, left leg elevated on pillows and wedge swelling and redness noted to left lower leg pulse palpated in right foot doppler used on left per denis mclain. Patient accompanied by family.Patient vancomycin started. Patient complains of still having pain to lower left leg so administered oxycodone 10mg.  Instructed patient to try to get some rest.Patient family states they were going to go home and come back in the morning. Lights off patient situated comfortably in bed and call bell in reach.
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
## 2017-03-15 NOTE — NURSING
Report given to ANUPAMA Baires on [facility]SU. Patient transferred to room 942 per stretcher in stable condition.

## 2017-03-15 NOTE — PHARMACY MED REC
"Admission Medication Reconciliation - Pharmacy Consult Note    The home medication history was taken by Bhavana Boston, Pharmacy Tech.    You may go to "Admission" then "Reconcile Home Medications" tabs to review and/or act upon these items. Based on information gathered and subsequent review by the clinical pharmacist, the items below may need attention.    Potentially problematic discrepancies with current MAR  o Patient is taking a drug DIFFERENTLY than how ordered upon admit  o Gabapentin 600mg PO qam, 300mg PO in the afternoon, and 600mg PO qPM    Abida Simon, PharmD  [id number]        Patient's prior to admission medication regimen was as follows:  Medication Sig    acetaminophen (TYLENOL) 325 MG tablet Take 325 mg by mouth every 6 (six) hours as needed for Pain.    artificial tears (ISOPTO TEARS) 0.5 % ophthalmic solution Place 1 drop into both eyes as needed. (Patient taking differently: Place 1 drop into both eyes as needed (for dry eyes). )    aspirin 325 MG tablet Take 325 mg by mouth 2 (two) times daily.    atorvastatin (LIPITOR) 40 MG tablet TAKE ONE TABLET BY MOUTH ONCE DAILY    collagenase ointment Apply topically every Mon, Wed, Fri. Nursing to apply Santyl  nickel thick to wound bed and edges and cover with damp gauze (to activate santyl) daily and cover with telfa island dressing (mepore) over each pressure injury. Santyl has autolytic debridement properties and will promote healing.    CYANOCOBALAMIN, VITAMIN B-12, (VITAMIN B-12 ORAL) Take 2,500 mcg by mouth once daily.    diclofenac sodium 1 % Gel Apply 2 g topically once daily. Apply 2 g to left shoulder    gabapentin (NEURONTIN) 300 MG capsule Take 1 capsule (300 mg total) by mouth 3 (three) times daily. (Patient taking differently: Take two capsules in the morning, one capsule at noon and two capsules in the evening)    melatonin 3 mg Tab Take 6 mg by mouth nightly as needed (for sleep).     metoprolol tartrate (LOPRESSOR) 25 MG " tablet Take 1 tablet (25 mg total) by mouth 2 (two) times daily.    omeprazole (PRILOSEC) 20 MG capsule Take 2 capsules (40 mg total) by mouth once daily.    oxycodone-acetaminophen (PERCOCET)  mg per tablet Take 1 tablet by mouth every 4 to 6 hours as needed for Pain.    ramipril (ALTACE) 5 MG capsule TAKE ONE CAPSULE BY MOUTH ONCE DAILY    tamsulosin (FLOMAX) 0.4 mg Cp24 Take 1 capsule (0.4 mg total) by mouth once daily.    torsemide (DEMADEX) 20 MG Tab Take 2 tablets (40 mg total) by mouth once daily. 1 tablet (20 mg) daily for 3 days. (Patient taking differently: Take 20 mg by mouth every morning. )    venlafaxine (EFFEXOR) 75 MG tablet Take 2 tablets (150 mg total) by mouth 2 (two) times daily.    vitamin D 1000 units Tab Take 1 tablet (1,000 Units total) by mouth once daily.    zinc sulfate (ZINCATE) 220 (50) mg capsule Take 1 capsule (220 mg total) by mouth once daily.         Please add appropriate    SmartPhrase below:

## 2017-03-15 NOTE — ASSESSMENT & PLAN NOTE
- noted in area overlying ex-fix  - orthopedic surgery consulted; they recommend strict elevation above heart at all times, and dressing recmomendations  - continue vancomycin for staph coverage, will consider pseudomonal coverage at another time with improvement of symptoms  - as patient with new purple well demarcated lesion along fixator pin, will consult dermatology to assess if there is an drug eruption rash in light of current circumstance, will appreciate recommendations

## 2017-03-15 NOTE — ED TRIAGE NOTES
Patient presents to ED with c/c of LLE swelling and redness since yesterday with pain lasting for a week. Patient family member report that he was at his follow up visit for a fever admission from two weeks ago when health team at his clinic instructed patient to come to [facility] ED.     Patient arrives to ED with an external fixation device in place since December 2016. Patient's wife at bedside reports that she cleans his pins everyday and has a home health nurse that visits patient 3 times a week. Dressings are in place over external pins. Dried drainage accompanied by pus noted around pins. Gauze against the skin has dried drainage adhered and built up around each site.

## 2017-03-15 NOTE — PROGRESS NOTES
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 The patient also has a left heel ulcer.   Patient was recently admitted to hospital for fever. At time all antibiotics were discontinued and fever reduced. Patient to have negative bone scan at the time.   Patient presents to clinic today for increased swelling of his foot. He stated that when he was in the hospital he was on lasix and the swelling was decreased. Since returning home and beginning his diuretics as home he has noticed that they are not as effective on his foot swelling. His wife reports that a few days ago his foot appeared dry and cracking. She placed neosporin on his foot, though due to allergy he broke out in a  Pruritic rash and redness to the top of his foot between his 2 pin sites. Patient stated that he is having pain to the pins in his foot. He denied fever chills or increased drianage from the pin sites.       PE:  Seen by myself and , External fixator in place; pin sites clean mild erythema to heel pin, skin shows erythematous rash to dorsal foot. Tenderness to lateral pin, no drainage. Mild swelling,     A/P:  71 year old male with left tibial shaft non-union s/p external fixator placement on 12/28/16.   Per : patient is to increases Neurontin to 300 for morning and afternoon dosage and increased night dosage to 600 mg over next week.   He recommends to hold on antibiotics at this time   He will go to Ed if increase in symptoms.   He is to return to clinic in 1 week for reevaluation, already has appointment for next Tuesday.
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
## 2017-03-16 PROBLEM — Z88.3: Status: ACTIVE | Noted: 2017-03-16

## 2017-03-16 PROBLEM — L03.114 CELLULITIS OF LEFT UPPER EXTREMITY: Status: ACTIVE | Noted: 2017-03-16

## 2017-03-16 PROBLEM — A41.9 SEPSIS: Status: ACTIVE | Noted: 2017-03-16

## 2017-03-16 LAB
ANION GAP SERPL CALC-SCNC: 12 MMOL/L
BACTERIA UR CULT: NO GROWTH
BASOPHILS # BLD AUTO: 0.01 K/UL
BASOPHILS NFR BLD: 0.1 %
BUN SERPL-MCNC: 12 MG/DL
CALCIUM SERPL-MCNC: 8.6 MG/DL
CHLORIDE SERPL-SCNC: 99 MMOL/L
CO2 SERPL-SCNC: 25 MMOL/L
CREAT SERPL-MCNC: 1.1 MG/DL
DIFFERENTIAL METHOD: ABNORMAL
EOSINOPHIL # BLD AUTO: 1.1 K/UL
EOSINOPHIL NFR BLD: 9.4 %
ERYTHROCYTE [DISTWIDTH] IN BLOOD BY AUTOMATED COUNT: 13.3 %
EST. GFR  (AFRICAN AMERICAN): >60 ML/MIN/1.73 M^2
EST. GFR  (NON AFRICAN AMERICAN): >60 ML/MIN/1.73 M^2
GLUCOSE SERPL-MCNC: 118 MG/DL
HCT VFR BLD AUTO: 36.1 %
HGB BLD-MCNC: 12.2 G/DL
LYMPHOCYTES # BLD AUTO: 0.7 K/UL
LYMPHOCYTES NFR BLD: 6.1 %
MCH RBC QN AUTO: 31 PG
MCHC RBC AUTO-ENTMCNC: 33.8 %
MCV RBC AUTO: 92 FL
MONOCYTES # BLD AUTO: 0.4 K/UL
MONOCYTES NFR BLD: 3.3 %
NEUTROPHILS # BLD AUTO: 9.6 K/UL
NEUTROPHILS NFR BLD: 80.9 %
PLATELET # BLD AUTO: 235 K/UL
PMV BLD AUTO: 10.5 FL
POTASSIUM SERPL-SCNC: 4 MMOL/L
RBC # BLD AUTO: 3.93 M/UL
SODIUM SERPL-SCNC: 136 MMOL/L
WBC # BLD AUTO: 11.88 K/UL

## 2017-03-16 PROCEDURE — 97161 PT EVAL LOW COMPLEX 20 MIN: CPT

## 2017-03-16 PROCEDURE — 11000001 HC ACUTE MED/SURG PRIVATE ROOM

## 2017-03-16 PROCEDURE — 99223 1ST HOSP IP/OBS HIGH 75: CPT | Mod: ,,, | Performed by: INTERNAL MEDICINE

## 2017-03-16 PROCEDURE — 25000003 PHARM REV CODE 250

## 2017-03-16 PROCEDURE — 93010 ELECTROCARDIOGRAM REPORT: CPT | Mod: ,,, | Performed by: INTERNAL MEDICINE

## 2017-03-16 PROCEDURE — 80048 BASIC METABOLIC PNL TOTAL CA: CPT

## 2017-03-16 PROCEDURE — 99233 SBSQ HOSP IP/OBS HIGH 50: CPT | Mod: GC,,, | Performed by: HOSPITALIST

## 2017-03-16 PROCEDURE — 97166 OT EVAL MOD COMPLEX 45 MIN: CPT

## 2017-03-16 PROCEDURE — 36415 COLL VENOUS BLD VENIPUNCTURE: CPT

## 2017-03-16 PROCEDURE — 93005 ELECTROCARDIOGRAM TRACING: CPT

## 2017-03-16 PROCEDURE — 25000003 PHARM REV CODE 250: Performed by: INTERNAL MEDICINE

## 2017-03-16 PROCEDURE — 63600175 PHARM REV CODE 636 W HCPCS

## 2017-03-16 PROCEDURE — 85025 COMPLETE CBC W/AUTO DIFF WBC: CPT

## 2017-03-16 RX ORDER — DIPHENHYDRAMINE HYDROCHLORIDE 50 MG/ML
25 INJECTION INTRAMUSCULAR; INTRAVENOUS EVERY 6 HOURS
Status: DISCONTINUED | OUTPATIENT
Start: 2017-03-16 | End: 2017-03-16

## 2017-03-16 RX ORDER — CETIRIZINE HYDROCHLORIDE 10 MG/1
10 TABLET ORAL DAILY
Status: DISCONTINUED | OUTPATIENT
Start: 2017-03-17 | End: 2017-03-23 | Stop reason: HOSPADM

## 2017-03-16 RX ORDER — DIPHENHYDRAMINE HYDROCHLORIDE 50 MG/ML
25 INJECTION INTRAMUSCULAR; INTRAVENOUS NIGHTLY
Status: DISCONTINUED | OUTPATIENT
Start: 2017-03-16 | End: 2017-03-17

## 2017-03-16 RX ORDER — DIPHENHYDRAMINE HCL 50 MG
50 CAPSULE ORAL EVERY 6 HOURS
Status: DISCONTINUED | OUTPATIENT
Start: 2017-03-16 | End: 2017-03-16

## 2017-03-16 RX ORDER — DIPHENHYDRAMINE HCL 25 MG
25 CAPSULE ORAL ONCE
Status: DISCONTINUED | OUTPATIENT
Start: 2017-03-16 | End: 2017-03-16

## 2017-03-16 RX ORDER — TRIAMCINOLONE ACETONIDE 1 MG/G
CREAM TOPICAL 2 TIMES DAILY
Status: DISCONTINUED | OUTPATIENT
Start: 2017-03-16 | End: 2017-03-23 | Stop reason: HOSPADM

## 2017-03-16 RX ORDER — METRONIDAZOLE 500 MG/100ML
500 INJECTION, SOLUTION INTRAVENOUS
Status: DISCONTINUED | OUTPATIENT
Start: 2017-03-16 | End: 2017-03-16

## 2017-03-16 RX ADMIN — DIPHENHYDRAMINE HYDROCHLORIDE 25 MG: 50 INJECTION, SOLUTION INTRAMUSCULAR; INTRAVENOUS at 09:03

## 2017-03-16 RX ADMIN — OXYCODONE AND ACETAMINOPHEN 1 TABLET: 10; 325 TABLET ORAL at 09:03

## 2017-03-16 RX ADMIN — POLYETHYLENE GLYCOL 3350 17 G: 17 POWDER, FOR SOLUTION ORAL at 08:03

## 2017-03-16 RX ADMIN — Medication 3 ML: at 08:03

## 2017-03-16 RX ADMIN — PANTOPRAZOLE SODIUM 40 MG: 40 TABLET, DELAYED RELEASE ORAL at 08:03

## 2017-03-16 RX ADMIN — ATORVASTATIN CALCIUM 40 MG: 20 TABLET, FILM COATED ORAL at 08:03

## 2017-03-16 RX ADMIN — TRIAMCINOLONE ACETONIDE: 1 CREAM TOPICAL at 11:03

## 2017-03-16 RX ADMIN — VENLAFAXINE 150 MG: 37.5 TABLET ORAL at 09:03

## 2017-03-16 RX ADMIN — VANCOMYCIN HYDROCHLORIDE 1750 MG: 1 INJECTION, POWDER, LYOPHILIZED, FOR SOLUTION INTRAVENOUS at 02:03

## 2017-03-16 RX ADMIN — ASPIRIN 325 MG ORAL TABLET 325 MG: 325 PILL ORAL at 08:03

## 2017-03-16 RX ADMIN — DIPHENHYDRAMINE HYDROCHLORIDE 25 MG: 25 CAPSULE ORAL at 04:03

## 2017-03-16 RX ADMIN — VENLAFAXINE 150 MG: 37.5 TABLET ORAL at 10:03

## 2017-03-16 RX ADMIN — Medication 3 ML: at 02:03

## 2017-03-16 RX ADMIN — DIPHENHYDRAMINE HYDROCHLORIDE 25 MG: 50 INJECTION, SOLUTION INTRAMUSCULAR; INTRAVENOUS at 02:03

## 2017-03-16 RX ADMIN — GABAPENTIN 300 MG: 300 CAPSULE ORAL at 02:03

## 2017-03-16 RX ADMIN — GABAPENTIN 600 MG: 300 CAPSULE ORAL at 09:03

## 2017-03-16 RX ADMIN — Medication 3 ML: at 10:03

## 2017-03-16 RX ADMIN — PIPERACILLIN SODIUM, TAZOBACTAM SODIUM 4.5 G: 4; .5 INJECTION, POWDER, LYOPHILIZED, FOR SOLUTION INTRAVENOUS at 04:03

## 2017-03-16 RX ADMIN — GABAPENTIN 600 MG: 300 CAPSULE ORAL at 08:03

## 2017-03-16 RX ADMIN — TAMSULOSIN HYDROCHLORIDE 0.4 MG: 0.4 CAPSULE ORAL at 08:03

## 2017-03-16 RX ADMIN — OXYCODONE AND ACETAMINOPHEN 1 TABLET: 10; 325 TABLET ORAL at 10:03

## 2017-03-16 RX ADMIN — DIPHENHYDRAMINE HYDROCHLORIDE 25 MG: 50 INJECTION, SOLUTION INTRAMUSCULAR; INTRAVENOUS at 08:03
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
## 2017-03-16 NOTE — PLAN OF CARE
Problem: Physical Therapy Goal  Goal: Physical Therapy Goal  Goals to be met by: 2017     Patient will increase functional independence with mobility by performin. Supine to sit with supervision  2. Sit to supine with supervision  3. Sit to stand transfer with Supervision  4. Bed to chair transfer with Stand-by Assistance   5. Gait x 10 feet with Stand-by Assistance using Rolling Walker.   5. Wheelchair propulsion x100 feet with Supervision using bilateral uppper extremities  Outcome: Ongoing (interventions implemented as appropriate)  Pt evaluation complete.      ЕЛЕНА MERAZ, PT  3/16/2017

## 2017-03-16 NOTE — PLAN OF CARE
Problem: Occupational Therapy Goal  Goal: Occupational Therapy Goal  Goals to be met by: 3/22/17     Patient will increase functional independence with ADLs by performing:    UE Dressing with Set-up Assistance.  LE Dressing with Supervision.  Grooming while seated with Set-up Assistance.  Toileting from bedside commode with Supervision for hygiene and clothing management.   Supine to sit with Modified [place].  Stand pivot transfers with Supervision with RW to stand..  Outcome: Ongoing (interventions implemented as appropriate)  Jamal Benz, OTR/L      3/16/2017

## 2017-03-16 NOTE — PT/OT/SLP EVAL
Occupational Therapy  Evaluation    Janusz Montgomery Jr.   MRN: [id number]   Admitting Diagnosis: Sepsis    OT Date of Treatment: 03/16/17   OT Start Time: 0850  OT Stop Time: 0915  OT Total Time (min): 25 min    Billable Minutes:  Evaluation 25    Diagnosis: Sepsis       Past Medical History:   Diagnosis Date    ALLERGIC RHINITIS     Anemia     Anxiety     Basal cell carcinoma     forehead    Basal cell carcinoma     left eylid    Basal cell carcinoma 08/18/2014    left prox cheek    Basal cell carcinoma 9/13/13    Right anterior shoulder    Basal cell carcinoma     Coronary artery disease involving native coronary artery of native heart without angina pectoris s/p RCA stent     Cortical cataract of both eyes 3/18/2016    Depression     Erectile dysfunction 3/24/2014    Essential hypertension     GERD (gastroesophageal reflux disease) 7/25/2012    Gout, arthritis     Grade III open fracture of left tibia and fibula s/p ex-fix on 7/1/16 and ORIF of left tibia on 7/15 7/6/2016    H/O: iritis     Helicobacter pylori (H. pylori) infection     Treated    Herpes simplex keratoconjunctivitis 9/30/2015    - on acyclovir - followed by opthalmology, Dr. Uribe     Hyperkalemia 2/28/2017    Hyperlipidemia     Hypogonadism male     Mixed anxiety and depressive disorder     Morbid obesity     Obstructive sleep apnea on CPAP     CPAP    Osteoarthritis of left knee 7/25/2012    Paroxysmal atrial fibrillation 7/6/2016    Prostate cancer 2/15/2016    - followed by urology, Dr. 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 Conteh    CATARACT EXTRACTION W/  INTRAOCULAR LENS IMPLANT Left 4/12/2016        EXTERNAL  FIXATION TIBIAL FRACTURE Left 2016    ORIF TIBIA FRACTURE Left 07/15/2016    Squamous cell cancer removal x3 with Mohs surgery      TONSILLECTOMY      TOTAL KNEE ARTHROPLASTY  10/2012    trus/bx         Referring physician: Dr Chamorro  Date referred to OT: 3/15/17    General Precautions: Standard, fall  Orthopedic Precautions: LLE non weight bearing  Braces:  (External Fixator (L) LE)    Do you have any cultural, spiritual, Mosque conflicts, given your current situation?: None stated     Patient History:  Living Environment  Lives With: spouse  Living Arrangements: house  Home Accessibility:  (Ramp in place)  Home Layout: Able to live on 1st floor  Stair Railings at Home: none  Transportation Available: family or friend will provide  Living Environment Comment: Pt  lives with wife in 1 story home with ramp in place. Pt reports that he was Mod (I) with W/C propulsion, functional transfers, and most self care tasks. He was receiving HH OT/PT prior to admit.  Equipment Currently Used at Home: walker, rolling, wheelchair, bedside commode, bath bench, slide board    Prior level of function:   Bed Mobility/Transfers: independent  Grooming: independent  Bathing: independent  Upper Body Dressing: independent  Lower Body Dressing: independent  Toileting: independent  Mode of Transportation: Family  Occupation: Retired  Type of Occupation: Retired Computer Speacialist     Dominant hand: right    Subjective:  Communicated with nurse prior to session.    Chief Complaint: Sepsis  Patient/Family stated goals: Pt wants to be able to do things for himself again.    Pain Ratin/10  Location - Side: Left  Location - Orientation: anterior  Location: foot  Pain Addressed: Reposition, Distraction  Pain Rating Post-Intervention: 4/10    Objective:  Patient found with: PICC line (and External Fixator (L) LE)    Cognitive Exam:  Oriented to: Person, Place, Time and Situation  Follows Commands/attention: Follows two-step  commands  Communication: clear/fluent  Memory:  No Deficits noted  Safety awareness/insight to disability: intact  Coping skills/emotional control: Appropriate to situation    Visual/perceptual:  Intact    Physical Exam:  Postural examination/scapula alignment: Posterior pelvic tilt  Skin integrity: Visible skin intact  Edema: None noted (B) UEs but (L) LE presents with Moderate edema distal extremity.    Sensation:   Intact    Upper Extremity Range of Motion:  Right Upper Extremity: WFL  Left Upper Extremity: WFL    Upper Extremity Strength:  Right Upper Extremity: Grossly 5/5 throughout  Left Upper Extremity: Grossly 4/5 throughout except Shld Flexion and Abduction with both grossly 3+/5 with shld stiffness reported. and moderate pain with resisted shlf flex/abd.   Strength: WFLS    Fine motor coordination:   Intact    Gross motor coordination: WFL    Functional Mobility:  Bed Mobility:  Rolling/Turning to Left: Modified independent  Rolling/Turning Right: Modified independent  Scooting/Bridging: Supervision  Supine to Sit: Supervision  Sit to Supine: Supervision    Transfers:  Sit <> Stand Assistance: Contact Guard Assistance  Sit <> Stand Assistive Device: Rolling Walker    Functional Ambulation: Pt was able to take 6 side steps with RW and   CGA with NWB maintained(L) LE    Activities of Daily Living:  Feeding Level of Assistance: Modified independent    UE Dressing Level of Assistance: Set-up Assistance (with no assist after set up. Pt donmalathi   [facility] [place] gown without assist sitting EOB unsupported.)    LE Dressing Level of Assistance: Set-up Assistance, Stand by assistance (with Pt aida hammer supine in bed and (R) sock sitting EOB with no assist after set up. Pt to be assessed   with aida hammer sitting EOB and standing to manage pants over hips .)    Grooming Position: Seated, EOB  Grooming Level of Assistance:  (Set  up only with pt grooming sitting unsupported EOB.)     Toileting Level of  "Assistance: Activity did not occur     Bathing Level of Assistance: Activity did not occur      Balance:   Static Sit: NORMAL: No deviations seen in posture held statically  Dynamic Sit: NORMAL: No deviations seen in posture held dynamically  Static Stand: FAIR: Maintains without assist but unable to take challenges RW to stand  Dynamic stand: FAIR: Needs CONTACT GUARD during gait RW to steady    Therapeutic Activities and Exercises:  OT evaluation performed.  White board updated.  Pt educated on role of OT, safety with functional mobility and ADLs    AM-PAC 6 CLICK ADL  How much help from another person does this patient currently need?  1 = Unable, Total/Dependent Assistance  2 = A lot, Maximum/Moderate Assistance  3 = A little, Minimum/Contact Guard/Supervision  4 = None, Modified Carrie/Independent    Putting on and taking off regular lower body clothing? : 3  Bathing (including washing, rinsing, drying)?: 3  Toileting, which includes using toilet, bedpan, or urinal? : 3  Putting on and taking off regular upper body clothing?: 3  Taking care of personal grooming such as brushing teeth?: 3  Eating meals?: 4  Total Score: 19    AM-PAC Raw Score CMS "G-Code Modifier Level of Impairment Assistance   6 % Total / Unable   7 - 9 CM 80 - 100% Maximal Assist   10 - 14 CL 60 - 80% Moderate Assist   15 - 19 CK 40 - 60% Moderate Assist   20 - 22 CJ 20 - 40% Minimal Assist   23 CI 1-20% SBA / CGA   24 CH 0% Independent/ Mod I       Patient left HOB elevated with all lines intact, call button in reach and nurse notified    Assessment:  Janusz Montgomery . is a 72 y.o. male with a medical diagnosis of Sepsis and presents   with decreased functional mobility, functional transfers, functional endurance, functional standing   balance and self care task (I)ce. Pt is motivated and would benefit from OT intervention to  further his  functional (I) and safety..    Rehab identified problem list/impairments: Rehab " identified problem list/impairments: weakness, impaired endurance, impaired self care skills, impaired functional mobilty, gait instability, impaired balance, decreased lower extremity function, decreased upper extremity function, pain, edema, orthopedic precautions, impaired cardiopulmonary response to activity    Rehab potential is Good    Activity tolerance: Good    Discharge recommendations: Discharge Facility/Level Of Care Needs: home with home health, home health OT     Barriers to discharge: Barriers to Discharge: None    Equipment recommendations: none     GOALS:   Occupational Therapy Goals        Problem: Occupational Therapy Goal    Goal Priority Disciplines Outcome Interventions   Occupational Therapy Goal     OT, PT/OT Ongoing (interventions implemented as appropriate)    Description:  Goals to be met by: 3/22/17     Patient will increase functional independence with ADLs by performing:    UE Dressing with Set-up Assistance.  LE Dressing with Supervision.  Grooming while seated with Set-up Assistance.  Toileting from bedside commode with Supervision for hygiene and clothing management.   Supine to sit with Modified [place].  Stand pivot transfers with Supervision with RW to stand..                PLAN:  Patient to be seen 4 x/week to address the above listed problems via self-care/home management, therapeutic activities, therapeutic exercises  Plan of Care expires: 04/16/17  Plan of Care reviewed with: patient         Jamal Benz OTR/L  03/16/2017

## 2017-03-16 NOTE — PLAN OF CARE
Problem: Patient Care Overview  Goal: Plan of Care Review  Outcome: Ongoing (interventions implemented as appropriate)  Slept well this shift. Complaint once of lt lower ext pain treated with oral pain med which was relieved. Also complain of itching to rash area which was treated with benadryl and relieved. VSS. No falls bed has been in low position with side rails up time 2. Needed items has been place all night next to bed. And call light in reach.

## 2017-03-16 NOTE — PROGRESS NOTES
Rash to nora arms, back and abd, chest area. Patient stated that a MD who came to look at his leg notice rash yesterday and stated he will let his other doctor know. According to patient this MD started him on oral benadryl which he just took a dose for rash with itching associated

## 2017-03-16 NOTE — SUBJECTIVE & OBJECTIVE
Interval History:   T max 100.2.  Rash of yesterday had improved, but diffuse pruritic rash manifested after dose of Zosyn this morning with associated shortness of breath and associated tachycardia.    No facial or lip swelling or blisters.  Zosyn was stopped and patient improved with IV benadryl.      At time of visit, patient is very drowsy,  complaining of itching and  just not feeling well today.     Review of Systems   Constitutional: Negative for chills, diaphoresis, fatigue and fever.   HENT: Negative.  Negative for congestion, sore throat and trouble swallowing.    Eyes: Negative for visual disturbance.   Respiratory: Negative for cough, shortness of breath and wheezing.    Cardiovascular: Positive for leg swelling. Negative for chest pain and palpitations.   Gastrointestinal: Negative for abdominal distention, constipation, diarrhea, nausea and vomiting.   Endocrine: Negative.    Genitourinary: Negative for dysuria and hematuria.   Musculoskeletal: Positive for joint swelling.        Left lower extremity pain and swelling   Skin: Positive for color change and wound.        Redness and drainage   Allergic/Immunologic: Negative.    Neurological: Negative.  Negative for dizziness, syncope and headaches.   Hematological: Negative.    Psychiatric/Behavioral: Negative.      Objective:     Vital Signs (Most Recent):  Temp: 99.9 °F (37.7 °C) (03/16/17 0741)  Pulse: (!) 120 (03/16/17 0741)  Resp: 20 (03/16/17 0741)  BP: 139/60 (03/16/17 0741)  SpO2: 97 % (03/16/17 0741) Vital Signs (24h Range):  Temp:  [98.3 °F (36.8 °C)-100.2 °F (37.9 °C)] 99.9 °F (37.7 °C)  Pulse:  [] 120  Resp:  [18-20] 20  SpO2:  [93 %-99 %] 97 %  BP: (105-145)/(46-65) 139/60     Weight: (!) 149.7 kg (330 lb)  Body mass index is 43.54 kg/(m^2).    Estimated Creatinine Clearance: 95.3 mL/min (based on Cr of 1.1).    Physical Exam   Constitutional: He is oriented to person, place, and time. He appears well-developed and well-nourished. No  distress.   HENT:   Head: Normocephalic and atraumatic.   Mouth/Throat: Oropharynx is clear and moist. No oropharyngeal exudate.   No facial or lip swelling. No mucosal lesions noted.   Eyes: EOM are normal. No scleral icterus.   Neck: Normal range of motion.   Cardiovascular: Normal rate, regular rhythm and normal heart sounds.    Pulmonary/Chest: Effort normal and breath sounds normal. No respiratory distress.   Abdominal: Soft. Bowel sounds are normal. He exhibits no distension. There is no tenderness.   Musculoskeletal: He exhibits edema (left lower leg).   Erythema, warmth, swelling left lower extremity - much  improved since yesterday, though there is now a distinct purplish patchy skin eruption/plaque over dorsum of foot.   External fixator in place.  No active purulence noted from visible pins. Dry crusting around all pins.     Left heel ulcer clean, no active drainage/purulence, no malodor.     Neurological: He is alert and oriented to person, place, and time.   Skin: Skin is warm and dry. Rash noted. He is not diaphoretic.   Maculopapular rash over face (appears to be resolving), back, abdomen, upper arms and legs.  Patchy/plaque like purplish band over dorsum of foot.     Psychiatric: He has a normal mood and affect. His behavior is normal.       Significant Labs:   Blood Culture:   Recent Labs  Lab 02/20/17  1150 02/24/17  1324 02/24/17  1334 03/14/17  2341 03/15/17  0045   LABBLOO No growth after 5 days. No growth after 5 days. No growth after 5 days. No Growth to date  No Growth to date No Growth to date  No Growth to date     CBC:   Recent Labs  Lab 03/14/17  2341 03/15/17  1700 03/16/17  0432   WBC 12.63 10.62 11.88   HGB 11.7* 11.5* 12.2*   HCT 34.7* 34.7* 36.1*    196 235     CMP:   Recent Labs  Lab 03/14/17  2341 03/16/17  0432    136   K 3.7 4.0   CL 99 99   CO2 28 25   GLU 97 118*   BUN 21 12   CREATININE 1.2 1.1   CALCIUM 8.8 8.6*   PROT 7.2  --    ALBUMIN 3.2*  --    BILITOT 0.8   --    ALKPHOS 127  --    AST 18  --    ALT 43  --    ANIONGAP 9 12   EGFRNONAA >60.0 >60.0       Significant Imaging: None

## 2017-03-16 NOTE — PLAN OF CARE
Extended Emergency Contact Information  Primary Emergency Contact: Bri Montgomery  Address: [address] [place] [address] [address]           [place] [address] [place]  Home Phone: [phone]  Work Phone: [phone]  Mobile Phone: [phone]  Relation: Spouse    Miguelina Weathers MD  [address] / [place] [address]    Future Appointments  Date Time Provider Department Center   3/21/2017 9:00 AM Walter Cortés MD [facility] ORTHO [address]   3/28/2017 1:00 PM Benny Cannon PA-C [facility] ID [address]   4/3/2017 8:00 AM LAB, [facility] [place]   4/28/2017 2:00 PM Miguelina Weathers MD [facility] [place]     Payor: HU[employer] MANAGED MEDICARE / Plan: HU[employer]NA MEDICARE HMO / Product Type: Capitation /       [employer] [id number]  [place] [address] [facility]  [address] [facility]  [place] [address]  Phone: [phone] Fax: [phone]       03/16/17 1631   Discharge Assessment   Assessment Type Discharge Planning Assessment   Confirmed/corrected address and phone number on facesheet? Yes   Assessment information obtained from? Medical Record   Communicated expected length of stay with patient/caregiver no   Prior to hospitilization cognitive status: Alert/Oriented   Prior to hospitalization functional status: Assistive Equipment   Current cognitive status: Alert/Oriented   Current Functional Status: Assistive Equipment   Arrived From home or self-care;home health   Lives With spouse   Able to Return to Prior Arrangements yes   Is patient able to care for self after discharge? No   How many people do you have in your home that can help with your care after discharge? 1   Who are your caregiver(s) and their phone number(s)? Bri Montgomery Spouse [phone]    Patient's perception of discharge disposition home or selfcare;home health   Readmission Within The Last 30 Days previous discharge plan unsuccessful   Patient currently being followed by outpatient case management? Yes   If yes,  name of outpatient case management following: [facility] outpatient case management   Patient currently receives home health services? Yes   Patient previously received home health services and would like to resume services if necessary? Yes   Does the patient currently use HME? Yes   Patient currently receives any other outside agency services? No   Equipment Currently Used at Home walker, rolling;wheelchair;bedside commode;bath bench;slide board   Do you have any problems affording any of your prescribed medications? No   Is the patient taking medications as prescribed? yes   Do you have any financial concerns preventing you from receiving the healthcare you need? No   Does the patient have transportation to healthcare appointments? Yes   Transportation Available family or friend will provide   On Dialysis? No   Does the patient receive services at the [facility]madin Clinic? No   Are there any open cases? No   Discharge Plan A Home with family;Home Health   Discharge Plan B Skilled Nursing Facility   Patient/Family In Agreement With Plan unable to assess

## 2017-03-16 NOTE — PROGRESS NOTES
ORTHO PROGRESS NOTE:      Attg Note:  All pin sites are colonized by definition and this one appears to have infection.  I would treat this with local peroxide/saline and abx.  MRI is of limited/no value.  No occlusive dressings on pins.  Let them drain.    Walter Cortés MD      Janusz Montgomery Jr. is a 72 y.o. male admitted on 3/14/2017  Hospital Day: 1  Post Op Day: * No surgery found *      The patient was seen and examined this morning at the bedside. No acute issues overnight and adequate control of pain on current regimen.    Patient did not work with PT yesterday.  Seen by ID for cellulitis.  Seen by wound care for heel wound.    PHYSICAL EXAM:  Awake/alert/oriented x 3  No acute distress  AF, tachycardic  Good inspiratory effort with unlabored breathing  Dressings c/d/i  NVI T/DP/SP with decreased sensation to toes likely neuropathic in nature  Pin sites with no drainage noted; crusting of old serous drainage  Erythema anterior leg and ankle with no fluctuance    Vitals:    03/15/17 2336 03/16/17 0211 03/16/17 0427 03/16/17 0741   BP: (!) 105/46  (!) 126/59 139/60   BP Location: Left arm  Left arm Left arm   Patient Position: Lying  Lying Lying   BP Method: Automatic  Automatic Automatic   Pulse: (!) 115  (!) 112 (!) 120   Resp: 20  20 20   Temp: 100.2 °F (37.9 °C) 99.4 °F (37.4 °C) 99 °F (37.2 °C) 99.9 °F (37.7 °C)   TempSrc: Oral Oral Oral Oral   SpO2: (!) 93%  99% 97%   Weight:       Height:         I/O last 3 completed shifts:  In: -   Out: 950 [Urine:950]    Recent Labs  Lab 03/14/17  2341 03/16/17  0432   CALCIUM 8.8 8.6*   PROT 7.2  --     136   K 3.7 4.0   CO2 28 25   CL 99 99   BUN 21 12   CREATININE 1.2 1.1       Recent Labs  Lab 03/14/17  2341 03/15/17  1700 03/16/17  0432   WBC 12.63 10.62 11.88   RBC 3.75* 3.66* 3.93*   HGB 11.7* 11.5* 12.2*   HCT 34.7* 34.7* 36.1*    196 235     No results for input(s): INR, APTT in the last 72 hours.    Invalid input(s): PT    Cultures NGTD  ESR  76, , procalcitonin <0.09    A/P: 72 y.o. male with LLE cellulitis and tibial nonunion.  Continue IV abx per ID.  Cellulitis improving.  Cultures NGTD.  Will consider further imaging.  Continue pin site care.  - PIN SITE CARE TWICE DAILY MIX 50% PEROXIDE WITH 50% NORMAL SALINE AND CLEAN ALL PIN SITES WITH GAUZE SOAKED IN SOLUTION. WRAP PIN SITES WITH DRY KERLEX.

## 2017-03-16 NOTE — ASSESSMENT & PLAN NOTE
- patient with recent drug reaction, possible anaphylaxis with ciprofloxacin and zosyn  - unclear at this time which agent caused the event, but both agents currently discontinued  - patient with marked improvement of rash and symptoms with cessation of medications  - will continue with ID recommendations on pseudomonal coverage, as patient with pseudomonal cellulitis in past
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
## 2017-03-17 ENCOUNTER — OUTPATIENT CASE MANAGEMENT (OUTPATIENT)
Dept: ADMINISTRATIVE | Facility: OTHER | Age: 72
End: 2017-03-17

## 2017-03-17 LAB
ALBUMIN SERPL BCP-MCNC: 2.4 G/DL
ALP SERPL-CCNC: 123 U/L
ALT SERPL W/O P-5'-P-CCNC: 49 U/L
ANION GAP SERPL CALC-SCNC: 10 MMOL/L
ANION GAP SERPL CALC-SCNC: 11 MMOL/L
AST SERPL-CCNC: 32 U/L
BACTERIA #/AREA URNS AUTO: NORMAL /HPF
BACTERIA SPEC AEROBE CULT: NORMAL
BASOPHILS # BLD AUTO: 0.01 K/UL
BASOPHILS # BLD AUTO: 0.01 K/UL
BASOPHILS NFR BLD: 0.1 %
BASOPHILS NFR BLD: 0.1 %
BILIRUB SERPL-MCNC: 0.8 MG/DL
BILIRUB UR QL STRIP: NEGATIVE
BUN SERPL-MCNC: 17 MG/DL
BUN SERPL-MCNC: 18 MG/DL
CALCIUM SERPL-MCNC: 8.4 MG/DL
CALCIUM SERPL-MCNC: 8.6 MG/DL
CHLORIDE SERPL-SCNC: 98 MMOL/L
CHLORIDE SERPL-SCNC: 98 MMOL/L
CK SERPL-CCNC: 48 U/L
CLARITY UR REFRACT.AUTO: ABNORMAL
CO2 SERPL-SCNC: 24 MMOL/L
CO2 SERPL-SCNC: 25 MMOL/L
COLOR UR AUTO: YELLOW
CREAT SERPL-MCNC: 1.6 MG/DL
CREAT SERPL-MCNC: 1.6 MG/DL
CREAT UR-MCNC: 59 MG/DL
CREAT UR-MCNC: 59 MG/DL
DIFFERENTIAL METHOD: ABNORMAL
DIFFERENTIAL METHOD: ABNORMAL
EOSINOPHIL # BLD AUTO: 1.9 K/UL
EOSINOPHIL # BLD AUTO: 1.9 K/UL
EOSINOPHIL NFR BLD: 20.8 %
EOSINOPHIL NFR BLD: 21.4 %
EOSINOPHIL URNS QL WRIGHT STN: NORMAL
ERYTHROCYTE [DISTWIDTH] IN BLOOD BY AUTOMATED COUNT: 13.3 %
ERYTHROCYTE [DISTWIDTH] IN BLOOD BY AUTOMATED COUNT: 13.7 %
EST. GFR  (AFRICAN AMERICAN): 49.7 ML/MIN/1.73 M^2
EST. GFR  (AFRICAN AMERICAN): 49.7 ML/MIN/1.73 M^2
EST. GFR  (NON AFRICAN AMERICAN): 43 ML/MIN/1.73 M^2
EST. GFR  (NON AFRICAN AMERICAN): 43 ML/MIN/1.73 M^2
GLUCOSE SERPL-MCNC: 114 MG/DL
GLUCOSE SERPL-MCNC: 115 MG/DL
GLUCOSE UR QL STRIP: NEGATIVE
GRAM STN SPEC: NORMAL
GRAM STN SPEC: NORMAL
HCT VFR BLD AUTO: 32.2 %
HCT VFR BLD AUTO: 33.4 %
HGB BLD-MCNC: 10.8 G/DL
HGB BLD-MCNC: 11 G/DL
HGB UR QL STRIP: ABNORMAL
KETONES UR QL STRIP: NEGATIVE
LEUKOCYTE ESTERASE UR QL STRIP: NEGATIVE
LYMPHOCYTES # BLD AUTO: 1 K/UL
LYMPHOCYTES # BLD AUTO: 1 K/UL
LYMPHOCYTES NFR BLD: 10.9 %
LYMPHOCYTES NFR BLD: 11.2 %
MCH RBC QN AUTO: 30.9 PG
MCH RBC QN AUTO: 31.1 PG
MCHC RBC AUTO-ENTMCNC: 32.9 %
MCHC RBC AUTO-ENTMCNC: 33.5 %
MCV RBC AUTO: 92 FL
MCV RBC AUTO: 94 FL
MICROSCOPIC COMMENT: NORMAL
MONOCYTES # BLD AUTO: 0.5 K/UL
MONOCYTES # BLD AUTO: 0.5 K/UL
MONOCYTES NFR BLD: 5.1 %
MONOCYTES NFR BLD: 5.4 %
NEUTROPHILS # BLD AUTO: 5.6 K/UL
NEUTROPHILS # BLD AUTO: 5.8 K/UL
NEUTROPHILS NFR BLD: 62.1 %
NEUTROPHILS NFR BLD: 62.6 %
NITRITE UR QL STRIP: NEGATIVE
PATH REV BLD -IMP: NORMAL
PATH REV BLD -IMP: NORMAL
PH UR STRIP: 6 [PH] (ref 5–8)
PLATELET # BLD AUTO: 198 K/UL
PLATELET # BLD AUTO: 203 K/UL
PMV BLD AUTO: 10 FL
PMV BLD AUTO: 10.4 FL
POTASSIUM SERPL-SCNC: 3.7 MMOL/L
POTASSIUM SERPL-SCNC: 4 MMOL/L
PROT SERPL-MCNC: 6.3 G/DL
PROT UR QL STRIP: NEGATIVE
PROT UR-MCNC: 9 MG/DL
PROT/CREAT RATIO, UR: 0.15
RBC # BLD AUTO: 3.5 M/UL
RBC # BLD AUTO: 3.54 M/UL
RBC #/AREA URNS AUTO: 0 /HPF (ref 0–4)
SODIUM SERPL-SCNC: 132 MMOL/L
SODIUM SERPL-SCNC: 134 MMOL/L
SODIUM UR-SCNC: <20 MMOL/L
SP GR UR STRIP: 1 (ref 1–1.03)
SQUAMOUS #/AREA URNS AUTO: 0 /HPF
URN SPEC COLLECT METH UR: ABNORMAL
UROBILINOGEN UR STRIP-ACNC: NEGATIVE EU/DL
VANCOMYCIN SERPL-MCNC: 28.5 UG/ML
VANCOMYCIN TROUGH SERPL-MCNC: 22.4 UG/ML
WBC # BLD AUTO: 9.08 K/UL
WBC # BLD AUTO: 9.25 K/UL
WBC #/AREA URNS AUTO: 0 /HPF (ref 0–5)

## 2017-03-17 PROCEDURE — 36569 INSJ PICC 5 YR+ W/O IMAGING: CPT

## 2017-03-17 PROCEDURE — 87205 SMEAR GRAM STAIN: CPT

## 2017-03-17 PROCEDURE — 87075 CULTR BACTERIA EXCEPT BLOOD: CPT

## 2017-03-17 PROCEDURE — 02HV33Z INSERTION OF INFUSION DEVICE INTO SUPERIOR VENA CAVA, PERCUTANEOUS APPROACH: ICD-10-PCS | Performed by: HOSPITALIST

## 2017-03-17 PROCEDURE — 87798 DETECT AGENT NOS DNA AMP: CPT

## 2017-03-17 PROCEDURE — 82550 ASSAY OF CK (CPK): CPT

## 2017-03-17 PROCEDURE — 25000003 PHARM REV CODE 250

## 2017-03-17 PROCEDURE — 25000003 PHARM REV CODE 250: Performed by: INTERNAL MEDICINE

## 2017-03-17 PROCEDURE — 80053 COMPREHEN METABOLIC PANEL: CPT

## 2017-03-17 PROCEDURE — 86790 VIRUS ANTIBODY NOS: CPT | Mod: 91

## 2017-03-17 PROCEDURE — 84300 ASSAY OF URINE SODIUM: CPT

## 2017-03-17 PROCEDURE — 11000001 HC ACUTE MED/SURG PRIVATE ROOM

## 2017-03-17 PROCEDURE — 63600175 PHARM REV CODE 636 W HCPCS

## 2017-03-17 PROCEDURE — 63600175 PHARM REV CODE 636 W HCPCS: Performed by: INTERNAL MEDICINE

## 2017-03-17 PROCEDURE — 99223 1ST HOSP IP/OBS HIGH 75: CPT | Mod: AI,GC,, | Performed by: ALLERGY & IMMUNOLOGY

## 2017-03-17 PROCEDURE — 85060 BLOOD SMEAR INTERPRETATION: CPT | Mod: ,,, | Performed by: PATHOLOGY

## 2017-03-17 PROCEDURE — 87205 SMEAR GRAM STAIN: CPT | Mod: 91

## 2017-03-17 PROCEDURE — 80202 ASSAY OF VANCOMYCIN: CPT | Mod: 91

## 2017-03-17 PROCEDURE — 86790 VIRUS ANTIBODY NOS: CPT

## 2017-03-17 PROCEDURE — 36415 COLL VENOUS BLD VENIPUNCTURE: CPT

## 2017-03-17 PROCEDURE — 85025 COMPLETE CBC W/AUTO DIFF WBC: CPT | Mod: 91

## 2017-03-17 PROCEDURE — 25000003 PHARM REV CODE 250: Performed by: STUDENT IN AN ORGANIZED HEALTH CARE EDUCATION/TRAINING PROGRAM

## 2017-03-17 PROCEDURE — C1751 CATH, INF, PER/CENT/MIDLINE: HCPCS

## 2017-03-17 PROCEDURE — 87070 CULTURE OTHR SPECIMN AEROBIC: CPT

## 2017-03-17 PROCEDURE — 82570 ASSAY OF URINE CREATININE: CPT

## 2017-03-17 PROCEDURE — 99233 SBSQ HOSP IP/OBS HIGH 50: CPT | Mod: ,,, | Performed by: INTERNAL MEDICINE

## 2017-03-17 PROCEDURE — 99233 SBSQ HOSP IP/OBS HIGH 50: CPT | Mod: GC,,, | Performed by: HOSPITALIST

## 2017-03-17 PROCEDURE — 25000003 PHARM REV CODE 250: Performed by: HOSPITALIST

## 2017-03-17 PROCEDURE — 81001 URINALYSIS AUTO W/SCOPE: CPT

## 2017-03-17 PROCEDURE — 80048 BASIC METABOLIC PNL TOTAL CA: CPT

## 2017-03-17 PROCEDURE — 76937 US GUIDE VASCULAR ACCESS: CPT

## 2017-03-17 PROCEDURE — 80202 ASSAY OF VANCOMYCIN: CPT

## 2017-03-17 RX ORDER — GABAPENTIN 300 MG/1
300 CAPSULE ORAL DAILY
Status: DISCONTINUED | OUTPATIENT
Start: 2017-03-17 | End: 2017-03-20

## 2017-03-17 RX ORDER — DIPHENHYDRAMINE HYDROCHLORIDE 50 MG/ML
12.5 INJECTION INTRAMUSCULAR; INTRAVENOUS EVERY 4 HOURS PRN
Status: DISCONTINUED | OUTPATIENT
Start: 2017-03-17 | End: 2017-03-21

## 2017-03-17 RX ORDER — DIPHENHYDRAMINE HCL 25 MG
25 CAPSULE ORAL EVERY 6 HOURS PRN
Status: DISCONTINUED | OUTPATIENT
Start: 2017-03-17 | End: 2017-03-23 | Stop reason: HOSPADM

## 2017-03-17 RX ORDER — DOXYLAMINE SUCCINATE 25 MG
TABLET ORAL 2 TIMES DAILY
Status: DISCONTINUED | OUTPATIENT
Start: 2017-03-17 | End: 2017-03-23 | Stop reason: HOSPADM

## 2017-03-17 RX ORDER — SODIUM CHLORIDE 0.9 % (FLUSH) 0.9 %
10 SYRINGE (ML) INJECTION
Status: DISCONTINUED | OUTPATIENT
Start: 2017-03-17 | End: 2017-03-23 | Stop reason: HOSPADM

## 2017-03-17 RX ORDER — SODIUM CHLORIDE 0.9 % (FLUSH) 0.9 %
10 SYRINGE (ML) INJECTION EVERY 6 HOURS
Status: DISCONTINUED | OUTPATIENT
Start: 2017-03-17 | End: 2017-03-23 | Stop reason: HOSPADM

## 2017-03-17 RX ADMIN — OXYCODONE AND ACETAMINOPHEN 1 TABLET: 10; 325 TABLET ORAL at 07:03

## 2017-03-17 RX ADMIN — TAMSULOSIN HYDROCHLORIDE 0.4 MG: 0.4 CAPSULE ORAL at 08:03

## 2017-03-17 RX ADMIN — Medication 3 ML: at 10:03

## 2017-03-17 RX ADMIN — VENLAFAXINE 150 MG: 37.5 TABLET ORAL at 09:03

## 2017-03-17 RX ADMIN — POLYETHYLENE GLYCOL 3350 17 G: 17 POWDER, FOR SOLUTION ORAL at 08:03

## 2017-03-17 RX ADMIN — TRIAMCINOLONE ACETONIDE: 1 CREAM TOPICAL at 03:03

## 2017-03-17 RX ADMIN — GABAPENTIN 600 MG: 300 CAPSULE ORAL at 08:03

## 2017-03-17 RX ADMIN — ATORVASTATIN CALCIUM 40 MG: 20 TABLET, FILM COATED ORAL at 08:03

## 2017-03-17 RX ADMIN — TRIAMCINOLONE ACETONIDE: 1 CREAM TOPICAL at 10:03

## 2017-03-17 RX ADMIN — Medication 10 ML: at 09:03

## 2017-03-17 RX ADMIN — VANCOMYCIN HYDROCHLORIDE 1500 MG: 1 INJECTION, POWDER, LYOPHILIZED, FOR SOLUTION INTRAVENOUS at 04:03

## 2017-03-17 RX ADMIN — PANTOPRAZOLE SODIUM 40 MG: 40 TABLET, DELAYED RELEASE ORAL at 08:03

## 2017-03-17 RX ADMIN — GABAPENTIN 300 MG: 300 CAPSULE ORAL at 03:03

## 2017-03-17 RX ADMIN — MICONAZOLE NITRATE: 20 CREAM TOPICAL at 03:03

## 2017-03-17 RX ADMIN — VANCOMYCIN HYDROCHLORIDE 1750 MG: 1 INJECTION, POWDER, LYOPHILIZED, FOR SOLUTION INTRAVENOUS at 02:03

## 2017-03-17 RX ADMIN — CETIRIZINE HYDROCHLORIDE 10 MG: 10 TABLET, FILM COATED ORAL at 08:03

## 2017-03-17 RX ADMIN — VENLAFAXINE 150 MG: 37.5 TABLET ORAL at 08:03

## 2017-03-17 RX ADMIN — DIPHENHYDRAMINE HYDROCHLORIDE 25 MG: 25 CAPSULE ORAL at 09:03

## 2017-03-17 RX ADMIN — GABAPENTIN 600 MG: 300 CAPSULE ORAL at 09:03

## 2017-03-17 RX ADMIN — OXYCODONE AND ACETAMINOPHEN 1 TABLET: 10; 325 TABLET ORAL at 09:03

## 2017-03-17 RX ADMIN — ASPIRIN 325 MG ORAL TABLET 325 MG: 325 PILL ORAL at 08:03
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
## 2017-03-17 NOTE — PROCEDURES
"Janusz Montgomery Jr. is a 72 y.o. male patient.    Temp: 97.9 °F (36.6 °C) (03/17/17 1134)  Pulse: 95 (03/17/17 1300)  Resp: 18 (03/17/17 1134)  BP: (!) 114/58 (03/17/17 1134)  SpO2: 95 % (03/17/17 1134)  Weight: (!) 149.7 kg (330 lb) (03/14/17 1733)  Height: 6' 1" (185.4 cm) (03/14/17 1733)    PICC  Date/Time: 3/17/2017 12:30 PM  Performed by: LILIA COX  Consent Done: Yes  Time out: Immediately prior to procedure a time out was called to verify the correct patient, procedure, equipment, support staff and site/side marked as required  Indications: med administration and vascular access  Anesthesia: local infiltration  Local anesthetic: lidocaine 1% without epinephrine  Anesthetic Total (mL): 3  Preparation: skin prepped with ChloraPrep  Skin prep agent dried: skin prep agent completely dried prior to procedure  Sterile barriers: all five maximum sterile barriers used - cap, mask, sterile gown, sterile gloves, and large sterile sheet  Hand hygiene: hand hygiene performed prior to central venous catheter insertion  Location details: left cephalic  Catheter type: double lumen  Catheter size: 5 Fr  Catheter Length: 45cm    Ultrasound guidance: yes  Vessel Caliber: medium and patent, compressibility normal  Needle advanced into vessel with real time Ultrasound guidance.  Guidewire confirmed in vessel.  Image recorded and saved.  Sterile sheath used.  no esophageal manometryNumber of attempts: 1  Post-procedure: blood return through all ports, chlorhexidine patch and sterile dressing applied  Technical procedures used: 3CG  Specimens: No  Implants: No  Assessment: placement verified by x-ray  Complications: none        Lilia Cox  3/17/2017  "

## 2017-03-17 NOTE — SUBJECTIVE & OBJECTIVE
Interval History:   Reports much improvement today. No difficulty swallowing, SOB, swelling today. Much improvement of rash (more pale today), purple skin tone of dorsum of foot has improved since yesterday. No acute complaints. Not on abx. Awaiting allergy consult.     Review of Systems   Constitutional: Negative for chills and fever.   Respiratory: Negative for cough and shortness of breath.    Cardiovascular: Negative for chest pain.   Gastrointestinal: Negative for abdominal pain, diarrhea, nausea and vomiting.   Genitourinary: Negative for dysuria.   Musculoskeletal: Negative for back pain.   Skin: Positive for rash and wound.   Neurological: Negative for headaches.     Objective:     Vital Signs (Most Recent):  Temp: 97.9 °F (36.6 °C) (03/17/17 1134)  Pulse: 95 (03/17/17 1300)  Resp: 18 (03/17/17 1134)  BP: (!) 114/58 (03/17/17 1134)  SpO2: 95 % (03/17/17 1134) Vital Signs (24h Range):  Temp:  [97.6 °F (36.4 °C)-98.9 °F (37.2 °C)] 97.9 °F (36.6 °C)  Pulse:  [] 95  Resp:  [18-20] 18  SpO2:  [94 %-99 %] 95 %  BP: (114-137)/(58-70) 114/58     Weight: (!) 149.7 kg (330 lb)  Body mass index is 43.54 kg/(m^2).    Estimated Creatinine Clearance: 65.5 mL/min (based on Cr of 1.6).    Physical Exam   Constitutional: He is oriented to person, place, and time. He appears well-developed and well-nourished. No distress.   HENT:   Head: Normocephalic and atraumatic.   Mouth/Throat: Oropharynx is clear and moist. No oropharyngeal exudate.   No facial or lip swelling. No mucosal lesions noted.   Eyes: EOM are normal. No scleral icterus.   Neck: Normal range of motion.   Cardiovascular: Normal rate, regular rhythm and normal heart sounds.    Pulmonary/Chest: Effort normal and breath sounds normal. No respiratory distress.   Abdominal: Soft. Bowel sounds are normal. He exhibits no distension. There is no tenderness.   Musculoskeletal: He exhibits edema (left lower leg).   Erythema, warmth, swelling left lower extremity -  much  improved since yesterday, along with the purple patchky skin eruption plaque over dorsum of the foot.  External fixator in place. Minimal drainage seen from pin. Cultured from 5th metatarsal pin today. Dry crusting around all pins.     Left heel ulcer clean, no active drainage/purulence, no malodor.     Neurological: He is alert and oriented to person, place, and time.   Skin: Skin is warm and dry. Rash noted. He is not diaphoretic.   Maculopapular rash over face (appears to be resolving), back, abdomen, upper arms and legs.  Patchy/plaque like purplish band over dorsum of foot.     Psychiatric: He has a normal mood and affect. His behavior is normal.       Significant Labs:   CBC:     Recent Labs  Lab 03/16/17  0432 03/17/17  0743 03/17/17  1051   WBC 11.88 9.25 9.08   HGB 12.2* 11.0* 10.8*   HCT 36.1* 33.4* 32.2*    198 203     CMP:     Recent Labs  Lab 03/16/17  0432 03/17/17  0530 03/17/17  1052    132* 134*   K 4.0 4.0 3.7   CL 99 98 98   CO2 25 24 25   * 114* 115*   BUN 12 17 18   CREATININE 1.1 1.6* 1.6*   CALCIUM 8.6* 8.4* 8.6*   PROT  --   --  6.3   ALBUMIN  --   --  2.4*   BILITOT  --   --  0.8   ALKPHOS  --   --  123   AST  --   --  32   ALT  --   --  49*   ANIONGAP 12 10 11   EGFRNONAA >60.0 43.0* 43.0*     Wound Culture:     Recent Labs  Lab 12/28/16  1308 02/07/17  1310 03/15/17  1517   LABAERO PSEUDOMONAS AERUGINOSAModerateSkin mynor also present PSEUDOMONAS AERUGINOSAModerate STAPHYLOCOCCUS SPECIESModerateIdentification and susceptibility pending     All pertinent labs within the past 24 hours have been reviewed.    Significant Imaging: I have reviewed all pertinent imaging results/findings within the past 24 hours.
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
## 2017-03-17 NOTE — PROGRESS NOTES
Dermatology Inpatient progress Note:  3/17/2017  7:48 PM    Subjective: patient feels rash is resolving. It is mildly pruritic. He has taken a few doses of benadryl which improved itching.     Scheduled Meds:   aspirin  325 mg Oral Daily    atorvastatin  40 mg Oral Daily    cetirizine  10 mg Oral Daily    gabapentin  300 mg Oral Daily    gabapentin  600 mg Oral BID    miconazole   Topical (Top) BID    pantoprazole  40 mg Oral Daily    polyethylene glycol  17 g Oral Daily    sodium chloride 0.9%  10 mL Intravenous Q6H    sodium chloride 0.9%  3 mL Intravenous Q8H    tamsulosin  0.4 mg Oral Daily    triamcinolone acetonide 0.1%   Topical (Top) BID    venlafaxine  150 mg Oral BID     Continuous Infusions:   PRN Meds:.bisacodyl, diphenhydrAMINE, diphenhydrAMINE, oxycodone-acetaminophen, oxycodone-acetaminophen, Flushing PICC Protocol **AND** sodium chloride 0.9% **AND** sodium chloride 0.9%    Review of Systems:  Constitutional:  no fevers, chills, fatigue, or malaise.  HEENT: no headaches, dysphagia, or mouth sores.  Ocular: no eye irritation or blurry vision.  Heme: no easy bruising or bleeding.  Musculoskeletal: no arthralgias, joint swelling, or myalgias.  GI: no nausea, vomiting, or diarrhea.  : no genital sores, dysuria, or genital itching.  Neuro: no numbness or tingling.  CV: no chest pain or shortness of breath.  Skin: + pruritus, denies burning, pain, blisters or history of keloidal scarring    Physical Exam:  Vitals:    03/17/17 1800   BP:    Pulse: 102   Resp:    Temp:      General: NAD, WDWN.  Psych: AAOx3.  HEENT: no mouth sores or nasal lesions.  Lymph: no lymphadenopathy.  GI: abdomen soft and nontender.  CV: RRR, no labored breathing.  Ocular: no ocular lesions or conjunctivitis.  Musculoskeletal: no joint edema or deformity noted.  Extremities: no peripheral edema.  Skin:  - Scalp: examined  - Face: examined. No erythema   - Neck:examined.   - Chest: examined. Pink patches with white  scaling   - Back: examined. Pink patches  - Abdomen: examined. Pink patches  - Genitalia/buttocks: no examined  - RLE and LLE: faint pink macules with fine white scale, erythema over shin and foot resolving, edema on lower legs, fixator in place  - LUE and RUE:  Faint pink macules       Assessment and Plan:  1. Dermatitis  - likely secondary to recent administration of cipro or zosyn, patient is improved from prior examination   - patient's regiscar score is 3 which is suggestive of possible dress (criteria include: -1 for lack of fever, 0 for lymph nodes- unable to assess due to body habitus, 0 for atypical lymphs, +2 for eso > 20%, +2 initial skin involvement and facial edema per hx, +1 for organ involvement (elevated creatinine 1.1--> 1.6), -1 for  Resolution in more than 15 days.   - also of note is that patient had eosinophilia over 2 weeks ago  - continue benadryl  - triamcinolone 0.1% cream BID   - cetirizine 10mg   - recommend getting TSH  - do not recommend high dose systemic steroids at this time. Would like to continue to monitor for clinical improvement.     Amie Chamorro, HO2  Dermatology

## 2017-03-17 NOTE — PROGRESS NOTES
ORTHO PROGRESS NOTE:      Attg Note:  All pin sites are colonized by definition and this one appears to have infection.  I would treat this with local peroxide/saline and abx.  MRI is of limited/no value.  No occlusive dressings on pins.  Let them drain.    Walter Cortés MD      Janusz Montgomery Jr. is a 72 y.o. male admitted on 3/14/2017  Hospital Day: 2  Post Op Day: * No surgery found *      The patient was seen and examined this morning at the bedside. No acute issues overnight and adequate control of pain on current regimen.    Patient did not work with PT yesterday.  Seen by ID for cellulitis.  Seen by wound care for heel wound.    PHYSICAL EXAM:  Awake/alert/oriented x 3  No acute distress  AF, tachycardic  Good inspiratory effort with unlabored breathing  Dressings c/d/i  NVI T/DP/SP with decreased sensation to toes likely neuropathic in nature  Pin sites with no drainage noted; crusting of old serous drainage  Erythema anterior leg and ankle with no fluctuance    Vitals:    03/17/17 0000 03/17/17 0100 03/17/17 0500 03/17/17 0526   BP: (!) 117/58  123/61    BP Location: Left arm  Left arm    Patient Position: Lying  Lying    BP Method: Automatic  Automatic    Pulse: 101 99 84 93   Resp: 18  18    Temp: 98 °F (36.7 °C)  97.6 °F (36.4 °C)    TempSrc: Oral  Oral    SpO2: 99%  (!) 94%    Weight:       Height:         I/O last 3 completed shifts:  In: 240 [P.O.:240]  Out: 450 [Urine:450]    Recent Labs  Lab 03/14/17  2341 03/16/17  0432 03/17/17  0530   CALCIUM 8.8 8.6* 8.4*   PROT 7.2  --   --     136 132*   K 3.7 4.0 4.0   CO2 28 25 24   CL 99 99 98   BUN 21 12 17   CREATININE 1.2 1.1 1.6*       Recent Labs  Lab 03/14/17  2341 03/15/17  1700 03/16/17  0432   WBC 12.63 10.62 11.88   RBC 3.75* 3.66* 3.93*   HGB 11.7* 11.5* 12.2*   HCT 34.7* 34.7* 36.1*    196 235     No results for input(s): INR, APTT in the last 72 hours.    Invalid input(s): PT    Cultures NGTD  ESR 76, , procalcitonin  <0.09    A/P: 72 y.o. male with LLE cellulitis and tibial nonunion.  Pin sites colonized by definition.  After discussion with staff and ID, will treat with long term IV abx to allow time for bone healing.  Further diagnostic studies unlikely to yield further information.  Cultures NGTD.  Continue pin site care.  Please call with acute changes or questions.  - PIN SITE CARE TWICE DAILY MIX 50% PEROXIDE WITH 50% NORMAL SALINE AND CLEAN ALL PIN SITES WITH GAUZE SOAKED IN SOLUTION. WRAP PIN SITES WITH DRY KERLEX.

## 2017-03-17 NOTE — ASSESSMENT & PLAN NOTE
- hypotensive in clinic earlier today, BP 80s systolic, given 1L fluids and BP responded, and was 150s systolic on arrival to ED  - likely secondary to cellulitis vs infected hardware vs osteomyelitis  - X-ray of L lower limb negative for bone fractures, but noted soft tissue swelling.  Unclear at this time if patient has underlying osteomyelitis given external fixator   - blood cultures NGTD x 3 days, urine negative, wound culture positive for staph  - discontinued zosyn and ciprofloxacin due to drug reaction, will continue vancomycin for staph coverage.  Per ID recs, if patient does decompensate hemodynamically or show unstable vital signs, will start gentamycin 5 mg/kg IV 1 dose with metronidazole 500 mg IV q8 hours for anaerobic and gram negative coverage

## 2017-03-17 NOTE — ASSESSMENT & PLAN NOTE
- noted in area overlying ex-fix  - orthopedic surgery consulted; they recommend strict elevation above heart at all times, and dressing recmomendations  - continue vancomycin for staph coverage, will consider pseudomonal coverage at another time with improvement of symptoms  - dermatology consulted, likely drug reaction with stasis/contact dermatitis on fixator pin in 5th metatarsal.  Potential biopsy today
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
## 2017-03-17 NOTE — CONSULTS
Double lumen PICC placed to Left cephalic vein.  45cm in length, 42cm arm circumference, 0cm exposed.  Lot# [id number].
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
## 2017-03-17 NOTE — PROGRESS NOTES
Met pt briefly on 3/15 at clinic appt. Pt was sent to the ED for hypotension and wound infection. Pt remains inpatient. Mrs. Montgomery aware that this RNCM will contact her post discharge. Mrs. Montgomery also has CM contact information. Will continue to follow.

## 2017-03-17 NOTE — PLAN OF CARE
Problem: Fall Risk (Adult)  Goal: Absence of Falls  Patient will demonstrate the desired outcomes by discharge/transition of care.   Outcome: Ongoing (interventions implemented as appropriate)  Patient remains free from any injuries or falls. Fall precautions maintained, Family at bedside.     Problem: Patient Care Overview  Goal: Plan of Care Review  Outcome: Ongoing (interventions implemented as appropriate)  Patient remains AAO X 4. No complains of pain. Wound care performed to the pin sites and left heel as ordered. VSS     Problem: Pressure Ulcer Risk (Isidoro Scale) (Adult,Obstetrics,Pediatric)  Goal: Skin Integrity  Patient will demonstrate the desired outcomes by discharge/transition of care.   Outcome: Ongoing (interventions implemented as appropriate)  Patient able to position/reposiotion independently. No new skin breakdowns. DSG to existing wound remains intact.
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
## 2017-03-17 NOTE — ASSESSMENT & PLAN NOTE
- patient with recent drug reaction, possible anaphylaxis with ciprofloxacin and zosyn  - unclear at this time which agent caused the event, but both agents currently discontinued  - patient with marked improvement of rash and symptoms with cessation of medications  - noted increase in Cr on 3/17 at 1.6 from 1.1 previously, temp of 100.6, diffuse rash, and eosinophils in WBC.  Concern for DRESS syndrome vs hypereosinophilic syndrome.  Will consult allergy for possible confirmation, will appreciate recommendations  - dermatology for possible biopsy to help confirm DRESS syndrome

## 2017-03-17 NOTE — SUBJECTIVE & OBJECTIVE
Interval History: Patient improvement of rash since day prior.  Notes continual itching, but otherwise more comfortable than day prior.  Denies fevers, chills, N/V, shortness of breath, chest or abdominal pains.      Review of Systems   Constitutional: Negative for chills, diaphoresis, fatigue and fever.   HENT: Negative.    Eyes: Negative for photophobia and visual disturbance.   Respiratory: Negative for cough, shortness of breath and wheezing.    Cardiovascular: Negative for chest pain, palpitations and leg swelling.   Gastrointestinal: Negative for abdominal distention, constipation, diarrhea, nausea and vomiting.   Endocrine: Negative.    Genitourinary: Negative for dysuria and hematuria.   Musculoskeletal:        Left lower extremity pain and swelling   Skin: Positive for color change and wound.        Redness and drainage   Allergic/Immunologic: Negative.    Neurological: Negative.    Hematological: Negative.    Psychiatric/Behavioral: Negative.      Objective:     Vital Signs (Most Recent):  Temp: 97.9 °F (36.6 °C) (03/17/17 1134)  Pulse: 95 (03/17/17 1134)  Resp: 18 (03/17/17 1134)  BP: (!) 114/58 (03/17/17 1134)  SpO2: 95 % (03/17/17 1134) Vital Signs (24h Range):  Temp:  [97.6 °F (36.4 °C)-98.9 °F (37.2 °C)] 97.9 °F (36.6 °C)  Pulse:  [] 95  Resp:  [18-20] 18  SpO2:  [94 %-99 %] 95 %  BP: (113-137)/(58-70) 114/58     Weight: (!) 149.7 kg (330 lb)  Body mass index is 43.54 kg/(m^2).    Intake/Output Summary (Last 24 hours) at 03/17/17 1252  Last data filed at 03/16/17 2141   Gross per 24 hour   Intake              240 ml   Output              450 ml   Net             -210 ml        Physical Exam   Constitutional: He is oriented to person, place, and time. He appears well-developed and well-nourished.   HENT:   Head: Normocephalic and atraumatic.   Eyes: EOM are normal. Pupils are equal, round, and reactive to light.   Neck: Normal range of motion.   Cardiovascular: Normal rate, regular rhythm, normal  heart sounds and intact distal pulses.    Pulmonary/Chest: Effort normal and breath sounds normal.   Abdominal: Soft. Bowel sounds are normal. He exhibits no distension. There is no tenderness.   Neurological: He is alert and oriented to person, place, and time.   Skin:   Erythema and swelling on left lower extremity.  In AM, patient had diffuse, erythematous rash all over limbs and torso, with erythematous lesions on L side of face.  Much improved after IV benadryl.  New onset purple demarcated lesion along external fixator pin on L foot        Significant Labs:   Recent Results (from the past 24 hour(s))   VANCOMYCIN, TROUGH before next dose    Collection Time: 03/17/17  1:18 AM   Result Value Ref Range    Vancomycin-Trough 22.4 (H) 10.0 - 22.0 ug/mL   Basic metabolic panel    Collection Time: 03/17/17  5:30 AM   Result Value Ref Range    Sodium 132 (L) 136 - 145 mmol/L    Potassium 4.0 3.5 - 5.1 mmol/L    Chloride 98 95 - 110 mmol/L    CO2 24 23 - 29 mmol/L    Glucose 114 (H) 70 - 110 mg/dL    BUN, Bld 17 8 - 23 mg/dL    Creatinine 1.6 (H) 0.5 - 1.4 mg/dL    Calcium 8.4 (L) 8.7 - 10.5 mg/dL    Anion Gap 10 8 - 16 mmol/L    eGFR if African American 49.7 (A) >60 mL/min/1.73 m^2    eGFR if non  43.0 (A) >60 mL/min/1.73 m^2   CBC auto differential    Collection Time: 03/17/17  7:43 AM   Result Value Ref Range    WBC 9.25 3.90 - 12.70 K/uL    RBC 3.54 (L) 4.60 - 6.20 M/uL    Hemoglobin 11.0 (L) 14.0 - 18.0 g/dL    Hematocrit 33.4 (L) 40.0 - 54.0 %    MCV 94 82 - 98 fL    MCH 31.1 (H) 27.0 - 31.0 pg    MCHC 32.9 32.0 - 36.0 %    RDW 13.7 11.5 - 14.5 %    Platelets 198 150 - 350 K/uL    MPV 10.4 9.2 - 12.9 fL    Gran # 5.8 1.8 - 7.7 K/uL    Lymph # 1.0 1.0 - 4.8 K/uL    Mono # 0.5 0.3 - 1.0 K/uL    Eos # 1.9 (H) 0.0 - 0.5 K/uL    Baso # 0.01 0.00 - 0.20 K/uL    Gran% 62.6 38.0 - 73.0 %    Lymph% 11.2 (L) 18.0 - 48.0 %    Mono% 5.1 4.0 - 15.0 %    Eosinophil% 20.8 (H) 0.0 - 8.0 %    Basophil% 0.1 0.0 -  1.9 %    Differential Method Automated    Vancomycin, random    Collection Time: 03/17/17  8:37 AM   Result Value Ref Range    Vancomycin, Random 28.5 Not established ug/mL   CK    Collection Time: 03/17/17  8:37 AM   Result Value Ref Range    CPK 48 20 - 200 U/L   Pathologist Interpretation Differential    Collection Time: 03/17/17 10:51 AM   Result Value Ref Range    Pathologist Review Review required    CBC auto differential    Collection Time: 03/17/17 10:51 AM   Result Value Ref Range    WBC 9.08 3.90 - 12.70 K/uL    RBC 3.50 (L) 4.60 - 6.20 M/uL    Hemoglobin 10.8 (L) 14.0 - 18.0 g/dL    Hematocrit 32.2 (L) 40.0 - 54.0 %    MCV 92 82 - 98 fL    MCH 30.9 27.0 - 31.0 pg    MCHC 33.5 32.0 - 36.0 %    RDW 13.3 11.5 - 14.5 %    Platelets 203 150 - 350 K/uL    MPV 10.0 9.2 - 12.9 fL    Gran # 5.6 1.8 - 7.7 K/uL    Lymph # 1.0 1.0 - 4.8 K/uL    Mono # 0.5 0.3 - 1.0 K/uL    Eos # 1.9 (H) 0.0 - 0.5 K/uL    Baso # 0.01 0.00 - 0.20 K/uL    Gran% 62.1 38.0 - 73.0 %    Lymph% 10.9 (L) 18.0 - 48.0 %    Mono% 5.4 4.0 - 15.0 %    Eosinophil% 21.4 (H) 0.0 - 8.0 %    Basophil% 0.1 0.0 - 1.9 %    Differential Method Automated    Comprehensive metabolic panel    Collection Time: 03/17/17 10:52 AM   Result Value Ref Range    Sodium 134 (L) 136 - 145 mmol/L    Potassium 3.7 3.5 - 5.1 mmol/L    Chloride 98 95 - 110 mmol/L    CO2 25 23 - 29 mmol/L    Glucose 115 (H) 70 - 110 mg/dL    BUN, Bld 18 8 - 23 mg/dL    Creatinine 1.6 (H) 0.5 - 1.4 mg/dL    Calcium 8.6 (L) 8.7 - 10.5 mg/dL    Total Protein 6.3 6.0 - 8.4 g/dL    Albumin 2.4 (L) 3.5 - 5.2 g/dL    Total Bilirubin 0.8 0.1 - 1.0 mg/dL    Alkaline Phosphatase 123 55 - 135 U/L    AST 32 10 - 40 U/L    ALT 49 (H) 10 - 44 U/L    Anion Gap 11 8 - 16 mmol/L    eGFR if African American 49.7 (A) >60 mL/min/1.73 m^2    eGFR if non  43.0 (A) >60 mL/min/1.73 m^2         Significant Imaging: All imaging reviewed
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
## 2017-03-18 LAB
ALBUMIN SERPL BCP-MCNC: 2.3 G/DL
ALP SERPL-CCNC: 118 U/L
ALT SERPL W/O P-5'-P-CCNC: 55 U/L
ANION GAP SERPL CALC-SCNC: 10 MMOL/L
AST SERPL-CCNC: 39 U/L
BASOPHILS # BLD AUTO: 0.01 K/UL
BASOPHILS NFR BLD: 0.1 %
BILIRUB SERPL-MCNC: 0.4 MG/DL
BUN SERPL-MCNC: 22 MG/DL
CALCIUM SERPL-MCNC: 8.5 MG/DL
CHLORIDE SERPL-SCNC: 102 MMOL/L
CO2 SERPL-SCNC: 27 MMOL/L
CREAT SERPL-MCNC: 1.6 MG/DL
DIFFERENTIAL METHOD: ABNORMAL
EOSINOPHIL # BLD AUTO: 1.5 K/UL
EOSINOPHIL NFR BLD: 20.8 %
ERYTHROCYTE [DISTWIDTH] IN BLOOD BY AUTOMATED COUNT: 13.2 %
EST. GFR  (AFRICAN AMERICAN): 49.7 ML/MIN/1.73 M^2
EST. GFR  (NON AFRICAN AMERICAN): 43 ML/MIN/1.73 M^2
GLUCOSE SERPL-MCNC: 105 MG/DL
HCT VFR BLD AUTO: 28.7 %
HGB BLD-MCNC: 10 G/DL
LYMPHOCYTES # BLD AUTO: 1.4 K/UL
LYMPHOCYTES NFR BLD: 19.1 %
MAGNESIUM SERPL-MCNC: 1.4 MG/DL
MCH RBC QN AUTO: 31.3 PG
MCHC RBC AUTO-ENTMCNC: 34.8 %
MCV RBC AUTO: 90 FL
MONOCYTES # BLD AUTO: 0.5 K/UL
MONOCYTES NFR BLD: 7.6 %
NEUTROPHILS # BLD AUTO: 3.7 K/UL
NEUTROPHILS NFR BLD: 52.3 %
PLATELET # BLD AUTO: 204 K/UL
PMV BLD AUTO: 9.5 FL
POTASSIUM SERPL-SCNC: 3.7 MMOL/L
PROT SERPL-MCNC: 5.9 G/DL
RBC # BLD AUTO: 3.19 M/UL
SODIUM SERPL-SCNC: 139 MMOL/L
T4 FREE SERPL-MCNC: 0.97 NG/DL
TSH SERPL DL<=0.005 MIU/L-ACNC: 6.16 UIU/ML
VANCOMYCIN SERPL-MCNC: 14.1 UG/ML
WBC # BLD AUTO: 7.07 K/UL

## 2017-03-18 PROCEDURE — 80053 COMPREHEN METABOLIC PANEL: CPT

## 2017-03-18 PROCEDURE — 36415 COLL VENOUS BLD VENIPUNCTURE: CPT

## 2017-03-18 PROCEDURE — 25000003 PHARM REV CODE 250

## 2017-03-18 PROCEDURE — 99499 UNLISTED E&M SERVICE: CPT | Mod: ,,, | Performed by: PHYSICIAN ASSISTANT

## 2017-03-18 PROCEDURE — 84439 ASSAY OF FREE THYROXINE: CPT

## 2017-03-18 PROCEDURE — 11000001 HC ACUTE MED/SURG PRIVATE ROOM

## 2017-03-18 PROCEDURE — 25000003 PHARM REV CODE 250: Performed by: STUDENT IN AN ORGANIZED HEALTH CARE EDUCATION/TRAINING PROGRAM

## 2017-03-18 PROCEDURE — 25000003 PHARM REV CODE 250: Performed by: HOSPITALIST

## 2017-03-18 PROCEDURE — 63600175 PHARM REV CODE 636 W HCPCS

## 2017-03-18 PROCEDURE — 99223 1ST HOSP IP/OBS HIGH 75: CPT | Mod: AI,GC,, | Performed by: ALLERGY & IMMUNOLOGY

## 2017-03-18 PROCEDURE — 63600175 PHARM REV CODE 636 W HCPCS: Performed by: INTERNAL MEDICINE

## 2017-03-18 PROCEDURE — 25000003 PHARM REV CODE 250: Performed by: INTERNAL MEDICINE

## 2017-03-18 PROCEDURE — 84443 ASSAY THYROID STIM HORMONE: CPT

## 2017-03-18 PROCEDURE — 80202 ASSAY OF VANCOMYCIN: CPT

## 2017-03-18 PROCEDURE — 85025 COMPLETE CBC W/AUTO DIFF WBC: CPT

## 2017-03-18 PROCEDURE — 83735 ASSAY OF MAGNESIUM: CPT

## 2017-03-18 PROCEDURE — 99233 SBSQ HOSP IP/OBS HIGH 50: CPT | Mod: GC,,, | Performed by: HOSPITALIST

## 2017-03-18 RX ORDER — MAGNESIUM SULFATE HEPTAHYDRATE 40 MG/ML
2 INJECTION, SOLUTION INTRAVENOUS ONCE
Status: COMPLETED | OUTPATIENT
Start: 2017-03-18 | End: 2017-03-18

## 2017-03-18 RX ADMIN — Medication 10 ML: at 06:03

## 2017-03-18 RX ADMIN — TAMSULOSIN HYDROCHLORIDE 0.4 MG: 0.4 CAPSULE ORAL at 09:03

## 2017-03-18 RX ADMIN — POLYETHYLENE GLYCOL 3350 17 G: 17 POWDER, FOR SOLUTION ORAL at 09:03

## 2017-03-18 RX ADMIN — GABAPENTIN 600 MG: 300 CAPSULE ORAL at 09:03

## 2017-03-18 RX ADMIN — CETIRIZINE HYDROCHLORIDE 10 MG: 10 TABLET, FILM COATED ORAL at 09:03

## 2017-03-18 RX ADMIN — Medication 10 ML: at 01:03

## 2017-03-18 RX ADMIN — PANTOPRAZOLE SODIUM 40 MG: 40 TABLET, DELAYED RELEASE ORAL at 09:03

## 2017-03-18 RX ADMIN — GABAPENTIN 600 MG: 300 CAPSULE ORAL at 08:03

## 2017-03-18 RX ADMIN — MAGNESIUM SULFATE IN WATER 2 G: 40 INJECTION, SOLUTION INTRAVENOUS at 04:03

## 2017-03-18 RX ADMIN — Medication 3 ML: at 01:03

## 2017-03-18 RX ADMIN — Medication 3 ML: at 09:03

## 2017-03-18 RX ADMIN — OXYCODONE AND ACETAMINOPHEN 1 TABLET: 10; 325 TABLET ORAL at 04:03

## 2017-03-18 RX ADMIN — ATORVASTATIN CALCIUM 40 MG: 20 TABLET, FILM COATED ORAL at 09:03

## 2017-03-18 RX ADMIN — VANCOMYCIN HYDROCHLORIDE 1500 MG: 1 INJECTION, POWDER, LYOPHILIZED, FOR SOLUTION INTRAVENOUS at 08:03

## 2017-03-18 RX ADMIN — MICONAZOLE NITRATE: 20 CREAM TOPICAL at 09:03

## 2017-03-18 RX ADMIN — ASPIRIN 325 MG ORAL TABLET 325 MG: 325 PILL ORAL at 09:03

## 2017-03-18 RX ADMIN — VENLAFAXINE 150 MG: 37.5 TABLET ORAL at 08:03

## 2017-03-18 RX ADMIN — VENLAFAXINE 150 MG: 37.5 TABLET ORAL at 09:03

## 2017-03-18 RX ADMIN — DIPHENHYDRAMINE HYDROCHLORIDE 12.5 MG: 50 INJECTION, SOLUTION INTRAMUSCULAR; INTRAVENOUS at 04:03

## 2017-03-18 RX ADMIN — TRIAMCINOLONE ACETONIDE: 1 CREAM TOPICAL at 08:03

## 2017-03-18 RX ADMIN — MICONAZOLE NITRATE: 20 CREAM TOPICAL at 08:03

## 2017-03-18 RX ADMIN — TRIAMCINOLONE ACETONIDE: 1 CREAM TOPICAL at 09:03

## 2017-03-18 RX ADMIN — OXYCODONE AND ACETAMINOPHEN 1 TABLET: 10; 325 TABLET ORAL at 09:03

## 2017-03-18 RX ADMIN — GABAPENTIN 300 MG: 300 CAPSULE ORAL at 01:03

## 2017-03-18 NOTE — MEDICAL/APP STUDENT
Progress Note  Hospital Medicine    Patient Name: Janusz Montgomery Jr.  YOB: 1945    Admit Date: 3/14/2017                     LOS: 3    SUBJECTIVE:     Reason for Admission:  Sepsis  See H&P for detailed presentating history and ROS.      Interval history: ***     aspirin  325 mg Oral Daily    atorvastatin  40 mg Oral Daily    cetirizine  10 mg Oral Daily    gabapentin  300 mg Oral Daily    gabapentin  600 mg Oral BID    miconazole   Topical (Top) BID    pantoprazole  40 mg Oral Daily    polyethylene glycol  17 g Oral Daily    sodium chloride 0.9%  10 mL Intravenous Q6H    sodium chloride 0.9%  3 mL Intravenous Q8H    tamsulosin  0.4 mg Oral Daily    triamcinolone acetonide 0.1%   Topical (Top) BID    vancomycin (VANCOCIN) IVPB  1,500 mg Intravenous Q12H    venlafaxine  150 mg Oral BID     OBJECTIVE:     Vital Signs Range (Last 24H):  Temp:  [97.5 °F (36.4 °C)-98.4 °F (36.9 °C)]   Pulse:  []   Resp:  [16-18]   BP: (122-132)/(56-62)   SpO2:  [96 %-98 %] Body mass index is 43.54 kg/(m^2).  Wt Readings from Last 1 Encounters:   03/14/17 1733 (!) 149.7 kg (330 lb)       I & O (Last 24H):  Intake/Output Summary (Last 24 hours) at 03/18/17 1845  Last data filed at 03/18/17 1818   Gross per 24 hour   Intake              720 ml   Output             2850 ml   Net            -2130 ml       Physical Exam:  {Exam:[id number]}    Diagnostic Results:    Recent Labs  Lab 03/17/17  0743 03/17/17  1051 03/18/17  0926   WBC 9.25 9.08 7.07   HGB 11.0* 10.8* 10.0*   HCT 33.4* 32.2* 28.7*    203 204         Recent Labs  Lab 03/14/17  2341  03/17/17  0530 03/17/17  1052 03/18/17  0508     < > 132* 134* 139   K 3.7  < > 4.0 3.7 3.7   CL 99  < > 98 98 102   CO2 28  < > 24 25 27   BUN 21  < > 17 18 22   CREATININE 1.2  < > 1.6* 1.6* 1.6*   CALCIUM 8.8  < > 8.4* 8.6* 8.5*   PROT 7.2  --   --  6.3 5.9*   BILITOT 0.8  --   --  0.8 0.4   ALKPHOS 127  --   --  123 118   ALT 43  --   --  49* 55*    AST 18  --   --  32 39   < > = values in this interval not displayed.  Lab Results   Component Value Date    INR 1.0 02/24/2017    INR 1.0 07/06/2016    INR 1.6 (H) 10/25/2012     Lab Results   Component Value Date    HGBA1C 5.3 07/11/2016     No results for input(s): POCTGLUCOSE in the last 72 hours.    ASSESSMENT/PLAN:     Active Hospital Problems    Diagnosis  POA    *Sepsis [A41.9]  Yes    Drug allergy, anti-infective [Z88.3]  Not Applicable    Cellulitis of left upper extremity [L03.114]  Yes    Cellulitis [L03.90]  Yes    Non-pressure chronic ulcer of other part of left foot with unspecified severity [L97.529]  Yes    Morbid obesity with BMI of 40.0-44.9, adult [E66.01, Z68.41]  Not Applicable    Anxiety [F41.9]  Yes    GERD (gastroesophageal reflux disease) [K21.9]  Yes    Essential hypertension [I10]  Yes    Hyperlipidemia [E78.5]  Yes    Coronary artery disease involving native coronary artery of native heart without angina pectoris s/p RCA stent [I25.10]  Yes      Resolved Hospital Problems    Diagnosis Date Resolved POA   No resolved problems to display.

## 2017-03-18 NOTE — ASSESSMENT & PLAN NOTE
- hypotensive in clinic on admission, BP 80s systolic, given 1L fluids and BP responded, and was 150s systolic on arrival to ED  - likely secondary to cellulitis vs infected hardware vs osteomyelitis  - X-ray of L lower limb negative for bone fractures, but noted soft tissue swelling.  Unclear at this time if patient has underlying osteomyelitis given external fixator   - blood cultures NGTD x 4 days, urine negative, wound culture positive for skin mynor; overall negative cultures to date   - otherwise afebrile during stay, no leukocytosis noted, overall showing major improvement with vancomycin and leg elevation   - discontinued zosyn and ciprofloxacin due to drug reaction, will continue vancomycin for staph coverage.  Per ID recs, if patient does decompensate hemodynamically or show unstable vital signs, will start gentamycin 5 mg/kg IV 1 dose with metronidazole 500 mg IV q8 hours for anaerobic and gram negative coverage
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
## 2017-03-18 NOTE — PROGRESS NOTES
S: no acute events overnight, afebrile. Patient reported improvement of his rash and the lesion over the left foot. Patient reported minimal pruritus over the back, no tenderness, no mucosal involvement.    O:   Vitals:    03/18/17 1300   BP:    Pulse: 75   Resp:    Temp:      General: Alert, awake, Oriented.     HEENT: no mouth sores or nasal lesions.  Extremities: + peripheral edema.  Skin:  - Scalp: Clear  - Face:Clear  - Neck: Clear  - Chest: Clear  - Back: mild erythematous patches  - Abdomen:clear  - Genitalia/buttocks:clear  - RUE: Clear.  - LUE: Clear  - RLE: Clear  - LLE:mild erythema over the left dorsal foot with white scaling, minimal edema. Fixator in place, Grade III pressure ulcer over the left heel  - Hands: clear  - Feet: Clear    Labs: CBC: WBC=7.07, eosinophilia (20.8%) decreased from 21.4 %  ALT=55 (49), Cr=1.6  TSH=6.157, Free T4=0.97    A/P:   1- Drug reaction: Diffuse morbiliform drug eruption, consistent with reaction to ciprofloxacin or zosyn, both of these medications were discontinued and patient has improved with almost complete resolution of the rash.    - Patient's DRESS regiscar score today is 1 which is not consistent with DRESS vs resolving (was 3 yesterday which was a possible DRESS) (criteria include: -1 for lack of fever, 0 for lymph nodes- unable to assess due to body habitus, 0 for atypical lymphs, +2 for eso > 20%, - 1 for rash not suggestive of DRESS,  +2 for organ involvement if considering liver and kidney dysfunction. -1 for Resolution in more than 15 days.   - also of note is that patient had eosinophilia over 2 weeks ago  - continue benadryl HS and cetirizine 10 mg PRN for itching.  - triamcinolone 0.1% cream BID PRN for back  -TSH high and normal Free T4, subclinical hypothyroidism, we recommend to recheck in 1 month after resolution of the acute illness and management per primary team.  - do not recommend high dose systemic steroids at this time. Patient is clinically  much improved, we will continue to follow.  Please avoid any ciprofloxacin or zosyn or any medication that can cross react.    Case was discussed with Dr. Ambrosio  Thank you for the consult, please call with any questions.    Deanna Valadez  PGY II Dermatology

## 2017-03-18 NOTE — ASSESSMENT & PLAN NOTE
- noted in area overlying ex-fix  - orthopedic surgery consulted; they recommend strict elevation above heart at all times, and dressing recmomendations  - continue vancomycin for staph coverage, will consider pseudomonal coverage at another time with improvement of symptoms  - dermatology consulted, likely drug reaction with stasis/contact dermatitis on fixator pin in 5th metatarsal
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
## 2017-03-18 NOTE — ASSESSMENT & PLAN NOTE
- patient with recent drug reaction, possible anaphylaxis with ciprofloxacin and zosyn  - unclear at this time which agent caused the event, but per allergy consult, likely zosyn as beta lactam allergy more likely   - patient with marked improvement of rash and symptoms with cessation of medications  - uncertain if DRESS syndrome present, but given improvement, unlikely at this point   - will continue to monitor, but mostly resolved at this point

## 2017-03-18 NOTE — PROGRESS NOTES
Chart reviewed.  Mild NAT.  Vanc trough low and restarted by primary team.  No growth on foot PIN cultures.  WIll see in AM.    Mike Parmar PA-C  Pager [phone]

## 2017-03-18 NOTE — SUBJECTIVE & OBJECTIVE
Interval History: Patient with continual improvement.  States he notices improvement in the erythema of his leg.  Denies fevers, chills, N/V, shortness of breath, chest or abdominal pains.      Review of Systems   Constitutional: Negative for chills, diaphoresis, fatigue and fever.   HENT: Negative.    Eyes: Negative for photophobia and visual disturbance.   Respiratory: Negative for cough, shortness of breath and wheezing.    Cardiovascular: Negative for chest pain, palpitations and leg swelling.   Gastrointestinal: Negative for abdominal distention, constipation, diarrhea, nausea and vomiting.   Endocrine: Negative.    Genitourinary: Negative for dysuria and hematuria.   Musculoskeletal:        Left lower extremity pain and swelling   Skin: Positive for color change and wound.        Redness and drainage   Allergic/Immunologic: Negative.    Neurological: Negative.    Hematological: Negative.    Psychiatric/Behavioral: Negative.      Objective:     Vital Signs (Most Recent):  Temp: 98 °F (36.7 °C) (03/18/17 1245)  Pulse: 72 (03/18/17 1500)  Resp: 16 (03/18/17 1245)  BP: (!) 128/58 (03/18/17 1245)  SpO2: 98 % (03/18/17 1245) Vital Signs (24h Range):  Temp:  [97.5 °F (36.4 °C)-98.4 °F (36.9 °C)] 98 °F (36.7 °C)  Pulse:  [] 72  Resp:  [16-18] 16  SpO2:  [96 %-98 %] 98 %  BP: (122-132)/(56-65) 128/58     Weight: (!) 149.7 kg (330 lb)  Body mass index is 43.54 kg/(m^2).    Intake/Output Summary (Last 24 hours) at 03/18/17 1544  Last data filed at 03/18/17 1327   Gross per 24 hour   Intake              720 ml   Output             1450 ml   Net             -730 ml        Physical Exam   Constitutional: He is oriented to person, place, and time. He appears well-developed and well-nourished.   HENT:   Head: Normocephalic and atraumatic.   Eyes: EOM are normal. Pupils are equal, round, and reactive to light.   Neck: Normal range of motion.   Cardiovascular: Normal rate, regular rhythm, normal heart sounds and intact  distal pulses.    Pulmonary/Chest: Effort normal and breath sounds normal.   Abdominal: Soft. Bowel sounds are normal. He exhibits no distension. There is no tenderness.   Neurological: He is alert and oriented to person, place, and time.   Skin:   Erythema and swelling on left lower extremity.  In AM, patient had diffuse, erythematous rash all over limbs and torso, with erythematous lesions on L side of face.  Much improved after IV benadryl.  New onset purple demarcated lesion along external fixator pin on L foot        Significant Labs:   Recent Results (from the past 24 hour(s))   Comprehensive metabolic panel    Collection Time: 03/18/17  5:08 AM   Result Value Ref Range    Sodium 139 136 - 145 mmol/L    Potassium 3.7 3.5 - 5.1 mmol/L    Chloride 102 95 - 110 mmol/L    CO2 27 23 - 29 mmol/L    Glucose 105 70 - 110 mg/dL    BUN, Bld 22 8 - 23 mg/dL    Creatinine 1.6 (H) 0.5 - 1.4 mg/dL    Calcium 8.5 (L) 8.7 - 10.5 mg/dL    Total Protein 5.9 (L) 6.0 - 8.4 g/dL    Albumin 2.3 (L) 3.5 - 5.2 g/dL    Total Bilirubin 0.4 0.1 - 1.0 mg/dL    Alkaline Phosphatase 118 55 - 135 U/L    AST 39 10 - 40 U/L    ALT 55 (H) 10 - 44 U/L    Anion Gap 10 8 - 16 mmol/L    eGFR if African American 49.7 (A) >60 mL/min/1.73 m^2    eGFR if non  43.0 (A) >60 mL/min/1.73 m^2   TSH    Collection Time: 03/18/17  5:08 AM   Result Value Ref Range    TSH 6.157 (H) 0.400 - 4.000 uIU/mL   Vancomycin, random    Collection Time: 03/18/17  5:08 AM   Result Value Ref Range    Vancomycin, Random 14.1 Not established ug/mL   T4, free    Collection Time: 03/18/17  5:08 AM   Result Value Ref Range    Free T4 0.97 0.71 - 1.51 ng/dL   CBC auto differential    Collection Time: 03/18/17  9:26 AM   Result Value Ref Range    WBC 7.07 3.90 - 12.70 K/uL    RBC 3.19 (L) 4.60 - 6.20 M/uL    Hemoglobin 10.0 (L) 14.0 - 18.0 g/dL    Hematocrit 28.7 (L) 40.0 - 54.0 %    MCV 90 82 - 98 fL    MCH 31.3 (H) 27.0 - 31.0 pg    MCHC 34.8 32.0 - 36.0 %    RDW  13.2 11.5 - 14.5 %    Platelets 204 150 - 350 K/uL    MPV 9.5 9.2 - 12.9 fL    Gran # 3.7 1.8 - 7.7 K/uL    Lymph # 1.4 1.0 - 4.8 K/uL    Mono # 0.5 0.3 - 1.0 K/uL    Eos # 1.5 (H) 0.0 - 0.5 K/uL    Baso # 0.01 0.00 - 0.20 K/uL    Gran% 52.3 38.0 - 73.0 %    Lymph% 19.1 18.0 - 48.0 %    Mono% 7.6 4.0 - 15.0 %    Eosinophil% 20.8 (H) 0.0 - 8.0 %    Basophil% 0.1 0.0 - 1.9 %    Differential Method Automated    Magnesium    Collection Time: 03/18/17  9:26 AM   Result Value Ref Range    Magnesium 1.4 (L) 1.6 - 2.6 mg/dL         Significant Imaging: All imaging reviewed

## 2017-03-19 PROBLEM — N17.9 AKI (ACUTE KIDNEY INJURY): Status: ACTIVE | Noted: 2017-03-19

## 2017-03-19 PROBLEM — L03.116 CELLULITIS OF LEFT LOWER EXTREMITY: Status: ACTIVE | Noted: 2017-03-19

## 2017-03-19 LAB
ALBUMIN SERPL BCP-MCNC: 2.5 G/DL
ALP SERPL-CCNC: 126 U/L
ALT SERPL W/O P-5'-P-CCNC: 78 U/L
ANION GAP SERPL CALC-SCNC: 8 MMOL/L
AST SERPL-CCNC: 63 U/L
BASOPHILS # BLD AUTO: 0.02 K/UL
BASOPHILS NFR BLD: 0.2 %
BILIRUB SERPL-MCNC: 0.5 MG/DL
BUN SERPL-MCNC: 20 MG/DL
CALCIUM SERPL-MCNC: 8.9 MG/DL
CHLORIDE SERPL-SCNC: 101 MMOL/L
CO2 SERPL-SCNC: 30 MMOL/L
CREAT SERPL-MCNC: 1.5 MG/DL
CRP SERPL-MCNC: 89.5 MG/L
DIFFERENTIAL METHOD: ABNORMAL
EOSINOPHIL # BLD AUTO: 1.6 K/UL
EOSINOPHIL NFR BLD: 16.2 %
ERYTHROCYTE [DISTWIDTH] IN BLOOD BY AUTOMATED COUNT: 13.5 %
ERYTHROCYTE [SEDIMENTATION RATE] IN BLOOD BY WESTERGREN METHOD: 90 MM/HR
EST. GFR  (AFRICAN AMERICAN): 53.8 ML/MIN/1.73 M^2
EST. GFR  (NON AFRICAN AMERICAN): 46.5 ML/MIN/1.73 M^2
GLUCOSE SERPL-MCNC: 102 MG/DL
HCT VFR BLD AUTO: 29.9 %
HGB BLD-MCNC: 10 G/DL
LYMPHOCYTES # BLD AUTO: 1.5 K/UL
LYMPHOCYTES NFR BLD: 16.1 %
MAGNESIUM SERPL-MCNC: 1.9 MG/DL
MCH RBC QN AUTO: 31.3 PG
MCHC RBC AUTO-ENTMCNC: 33.4 %
MCV RBC AUTO: 93 FL
MONOCYTES # BLD AUTO: 0.7 K/UL
MONOCYTES NFR BLD: 7.1 %
NEUTROPHILS # BLD AUTO: 5.7 K/UL
NEUTROPHILS NFR BLD: 60.3 %
PLATELET # BLD AUTO: 235 K/UL
PMV BLD AUTO: 9.9 FL
POTASSIUM SERPL-SCNC: 4 MMOL/L
PROT SERPL-MCNC: 6.2 G/DL
RBC # BLD AUTO: 3.2 M/UL
SODIUM SERPL-SCNC: 139 MMOL/L
VANCOMYCIN TROUGH SERPL-MCNC: 25.8 UG/ML
WBC # BLD AUTO: 9.54 K/UL

## 2017-03-19 PROCEDURE — 86140 C-REACTIVE PROTEIN: CPT

## 2017-03-19 PROCEDURE — 99232 SBSQ HOSP IP/OBS MODERATE 35: CPT | Mod: GC,,, | Performed by: HOSPITALIST

## 2017-03-19 PROCEDURE — 86580 TB INTRADERMAL TEST: CPT | Performed by: INTERNAL MEDICINE

## 2017-03-19 PROCEDURE — 25000003 PHARM REV CODE 250

## 2017-03-19 PROCEDURE — 85651 RBC SED RATE NONAUTOMATED: CPT

## 2017-03-19 PROCEDURE — 63600175 PHARM REV CODE 636 W HCPCS

## 2017-03-19 PROCEDURE — 85025 COMPLETE CBC W/AUTO DIFF WBC: CPT

## 2017-03-19 PROCEDURE — 25000003 PHARM REV CODE 250: Performed by: STUDENT IN AN ORGANIZED HEALTH CARE EDUCATION/TRAINING PROGRAM

## 2017-03-19 PROCEDURE — 83735 ASSAY OF MAGNESIUM: CPT

## 2017-03-19 PROCEDURE — 11000001 HC ACUTE MED/SURG PRIVATE ROOM

## 2017-03-19 PROCEDURE — 25000003 PHARM REV CODE 250: Performed by: INTERNAL MEDICINE

## 2017-03-19 PROCEDURE — 99499 UNLISTED E&M SERVICE: CPT | Mod: ,,, | Performed by: PHYSICIAN ASSISTANT

## 2017-03-19 PROCEDURE — 80202 ASSAY OF VANCOMYCIN: CPT

## 2017-03-19 PROCEDURE — 63600175 PHARM REV CODE 636 W HCPCS: Performed by: INTERNAL MEDICINE

## 2017-03-19 PROCEDURE — 25000003 PHARM REV CODE 250: Performed by: HOSPITALIST

## 2017-03-19 PROCEDURE — 97802 MEDICAL NUTRITION INDIV IN: CPT

## 2017-03-19 PROCEDURE — 80053 COMPREHEN METABOLIC PANEL: CPT

## 2017-03-19 RX ORDER — METOPROLOL TARTRATE 1 MG/ML
5 INJECTION, SOLUTION INTRAVENOUS EVERY 5 MIN PRN
Status: DISCONTINUED | OUTPATIENT
Start: 2017-03-19 | End: 2017-03-19

## 2017-03-19 RX ADMIN — GABAPENTIN 600 MG: 300 CAPSULE ORAL at 08:03

## 2017-03-19 RX ADMIN — CETIRIZINE HYDROCHLORIDE 10 MG: 10 TABLET, FILM COATED ORAL at 08:03

## 2017-03-19 RX ADMIN — GABAPENTIN 300 MG: 300 CAPSULE ORAL at 01:03

## 2017-03-19 RX ADMIN — VENLAFAXINE 150 MG: 37.5 TABLET ORAL at 08:03

## 2017-03-19 RX ADMIN — Medication 3 ML: at 06:03

## 2017-03-19 RX ADMIN — Medication 5 UNITS: at 10:03

## 2017-03-19 RX ADMIN — OXYCODONE AND ACETAMINOPHEN 1 TABLET: 10; 325 TABLET ORAL at 08:03

## 2017-03-19 RX ADMIN — MICONAZOLE NITRATE: 20 CREAM TOPICAL at 10:03

## 2017-03-19 RX ADMIN — Medication 10 ML: at 05:03

## 2017-03-19 RX ADMIN — ASPIRIN 325 MG ORAL TABLET 325 MG: 325 PILL ORAL at 08:03

## 2017-03-19 RX ADMIN — VANCOMYCIN HYDROCHLORIDE 1500 MG: 1 INJECTION, POWDER, LYOPHILIZED, FOR SOLUTION INTRAVENOUS at 07:03

## 2017-03-19 RX ADMIN — TRIAMCINOLONE ACETONIDE: 1 CREAM TOPICAL at 09:03

## 2017-03-19 RX ADMIN — POLYETHYLENE GLYCOL 3350 17 G: 17 POWDER, FOR SOLUTION ORAL at 08:03

## 2017-03-19 RX ADMIN — VANCOMYCIN HYDROCHLORIDE 1500 MG: 1 INJECTION, POWDER, LYOPHILIZED, FOR SOLUTION INTRAVENOUS at 09:03

## 2017-03-19 RX ADMIN — Medication 3 ML: at 10:03

## 2017-03-19 RX ADMIN — VENLAFAXINE 150 MG: 37.5 TABLET ORAL at 09:03

## 2017-03-19 RX ADMIN — TRIAMCINOLONE ACETONIDE: 1 CREAM TOPICAL at 10:03

## 2017-03-19 RX ADMIN — Medication 10 ML: at 06:03

## 2017-03-19 RX ADMIN — Medication 10 ML: at 12:03

## 2017-03-19 RX ADMIN — GABAPENTIN 600 MG: 300 CAPSULE ORAL at 09:03

## 2017-03-19 RX ADMIN — TAMSULOSIN HYDROCHLORIDE 0.4 MG: 0.4 CAPSULE ORAL at 08:03

## 2017-03-19 RX ADMIN — MICONAZOLE NITRATE: 20 CREAM TOPICAL at 09:03

## 2017-03-19 RX ADMIN — PANTOPRAZOLE SODIUM 40 MG: 40 TABLET, DELAYED RELEASE ORAL at 08:03

## 2017-03-19 RX ADMIN — ATORVASTATIN CALCIUM 40 MG: 20 TABLET, FILM COATED ORAL at 08:03
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
## 2017-03-19 NOTE — ASSESSMENT & PLAN NOTE
- patient with recent drug reaction, with ciprofloxacin and/or zosyn  - unclear at this time which agent caused the event, but per allergy consult, likely zosyn as beta lactam allergy more likely   - patient with marked improvement of rash and symptoms with cessation of medications  - uncertain if DRESS syndrome present, but given improvement, unlikely at this point. Eosinophils improving.   - will continue to monitor, but mostly resolved at this point

## 2017-03-19 NOTE — ASSESSMENT & PLAN NOTE
- hypotensive in clinic on admission, BP 80s systolic, given 1L fluids and BP responded, and was 150s systolic on arrival to ED  - likely secondary to cellulitis vs infected hardware vs osteomyelitis  - X-ray of L lower limb negative for bone fractures, but noted soft tissue swelling.  Unclear at this time if patient has underlying osteomyelitis given external fixator   - blood cultures NGTD x 4 days, urine negative, wound culture positive for skin mynor; overall negative cultures to date   - otherwise afebrile during stay, no leukocytosis noted  - discontinued zosyn and ciprofloxacin due to drug reaction, will continue vancomycin for staph coverage.  Per ID recs, if patient does decompensate hemodynamically or show unstable vital signs, will start gentamycin 5 mg/kg IV 1 dose with metronidazole 500 mg IV q8 hours for anaerobic and gram negative coverage

## 2017-03-19 NOTE — PLAN OF CARE
Problem: Fall Risk (Adult)  Goal: Identify Related Risk Factors and Signs and Symptoms  Related risk factors and signs and symptoms are identified upon initiation of Human Response Clinical Practice Guideline (CPG)   Outcome: Ongoing (interventions implemented as appropriate)  No falls noted. Cont to monitor.

## 2017-03-19 NOTE — ASSESSMENT & PLAN NOTE
- hold BP medications given concern for further hypotension in setting of sepsis  - Currently well controlled.

## 2017-03-19 NOTE — PLAN OF CARE
Problem: Infection, Risk/Actual (Adult)  Intervention: Prevent Infection/Maximize Resistance  Pin site care done at 1015AM. Pt tolerated well.

## 2017-03-19 NOTE — PLAN OF CARE
Problem: Patient Care Overview  Goal: Plan of Care Review  Recommendations     Recommendation/Intervention:   1. Order Arginaid - BID to aide in wound healing.   2. Continue Boost ONS daily   3. Encourage po intake of regular diet >75% - promote intake of HBV protein foods   4. RD to monitor.      Goals: Pt will meet >85% EPN and EEN  Nutrition Goal Status: new

## 2017-03-19 NOTE — PT/OT/SLP DISCHARGE
Occupational Therapy Discharge Summary    Janusz Montgomery Jr.  MRN: [id number]   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   Patient Discharged from acute Occupational Therapy on 1/3/2017.  Please refer to prior OT note dated on 1/2/2017 for functional status.     Assessment:   Patient appropriate for care in another setting.  GOALS:   Occupational Therapy Goals     Not on file      Multidisciplinary Problems (Resolved)        Problem: Occupational Therapy Goal    Goal Priority Disciplines Outcome Interventions   Occupational Therapy Goal   (Resolved)     OT, PT/OT Outcome(s) achieved    Description:  Goals to be met by: 14 days     Patient will increase functional independence with ADLs by performing:    LE Dressing with Set-up Assistance and Assistive Devices as needed. --met  Toileting from bedside commode with Minimal Assistance for hygiene and clothing management. --met  Bathing from  edge of bed with Minimal Assistance. --met  Stand pivot transfers with Modified [place].--met squat pivot--cont to address stand with RW  Toilet transfer to bedside commode with Modified [place]. --not witnessed, pt transfers on/off standard toilet w/ grab bar Mod [place]                  Reasons for Discontinuation of Therapy Services  Transfer to alternate level of care.      Plan:  Patient Discharged to: Skilled Nursing Facility.    Rachel Watt, OTR/L  3/19/2017

## 2017-03-19 NOTE — SUBJECTIVE & OBJECTIVE
Interval History: No AEON. Pt reports improvement with rash.    Review of Systems   Constitutional: Negative for chills and fever.   Respiratory: Negative for cough, shortness of breath and wheezing.    Cardiovascular: Negative for chest pain, palpitations and leg swelling.   Gastrointestinal: Negative for abdominal pain, constipation, diarrhea, nausea and vomiting.   Genitourinary: Negative for difficulty urinating and dysuria.     Objective:     Vital Signs (Most Recent):  Temp: 97.8 °F (36.6 °C) (03/19/17 1256)  Pulse: 73 (03/19/17 1300)  Resp: 16 (03/19/17 1256)  BP: 137/74 (03/19/17 1256)  SpO2: 95 % (03/19/17 1256) Vital Signs (24h Range):  Temp:  [96.4 °F (35.8 °C)-97.9 °F (36.6 °C)] 97.8 °F (36.6 °C)  Pulse:  [72-92] 73  Resp:  [16-18] 16  SpO2:  [94 %-98 %] 95 %  BP: (117-141)/(55-74) 137/74     Weight: (!) 149.7 kg (330 lb)  Body mass index is 43.54 kg/(m^2).    Intake/Output Summary (Last 24 hours) at 03/19/17 1425  Last data filed at 03/19/17 0758   Gross per 24 hour   Intake                0 ml   Output             2875 ml   Net            -2875 ml      Physical Exam   Constitutional: He appears well-developed and well-nourished. No distress.   Neck: Normal range of motion. Neck supple.   Cardiovascular: Normal rate, regular rhythm, normal heart sounds and intact distal pulses.  Exam reveals no gallop and no friction rub.    No murmur heard.  Pulmonary/Chest: Effort normal and breath sounds normal. No respiratory distress. He has no wheezes. He has no rales.   Abdominal: Soft. Bowel sounds are normal. He exhibits no distension. There is no tenderness.   Musculoskeletal:   LLE ex fix. No ooze or pus from pins   Lymphadenopathy:     He has no cervical adenopathy.   Skin: Rash (improving) noted.       Significant Labs:   CBC:   Recent Labs  Lab 03/18/17  0926 03/19/17  0400   WBC 7.07 9.54   HGB 10.0* 10.0*   HCT 28.7* 29.9*    235     CMP:   Recent Labs  Lab 03/18/17  0508 03/19/17  0400     139   K 3.7 4.0    101   CO2 27 30*    102   BUN 22 20   CREATININE 1.6* 1.5*   CALCIUM 8.5* 8.9   PROT 5.9* 6.2   ALBUMIN 2.3* 2.5*   BILITOT 0.4 0.5   ALKPHOS 118 126   AST 39 63*   ALT 55* 78*   ANIONGAP 10 8   EGFRNONAA 43.0* 46.5*       Significant Imaging: I have reviewed all pertinent imaging results/findings within the past 24 hours.

## 2017-03-19 NOTE — CONSULTS
"[facility]  Adult Nutrition  Consult Note    SUMMARY     Recommendations    Recommendation/Intervention:   1. Order Arginaid - BID to aide in wound healing.   2. Continue Boost ONS daily   3. Encourage po intake of regular diet >75% - promote intake of HBV protein foods   4. RD to monitor.     Goals: Pt will meet >85% EPN and EEN  Nutrition Goal Status: new  Communication of RD Recs: reviewed with RN    Continuum of Care Plan    Referral to Outpatient Services: other (see comments) (Nutrition D/C Planning: regular diet with HBV protein/OS)    Reason for Assessment    Reason for Assessment: nurse/nurse practitioner consult  Diagnosis: infection/sepsis (cellulitis)  Relevent Medical History: morbid obesity, HTN, HLD, CAD, Prostate ca (pending tx), CHARISMA on CPAP, depression   Interdisciplinary Rounds: did not attend     General Information Comments: Pt with sister at bedside reporting typical good appetite and optimal po intake past couple days. Patient did discuss a "fluke" in appetite change and did not eat much PTA. Appetite improving. Pt with decub ulceration and says it's healing slowly. Agreed to OS. RD to monitor.     Nutrition Prescription Ordered    Current Diet Order: Regular     Evaluation of Received Nutrients/Fluid Intake     Oral Fluid (mL): 960       Nutrition Risk Screen     Nutrition Risk Screen: no indicators present    Nutrition/Diet History    Patient Reported Diet/Restrictions/Preferences: general  Typical Food/Fluid Intake: recently good po intake %  Food Preferences: no cultural or Mandaeism needs identified        Factors Affecting Nutritional Intake:  (-)     Labs/Tests/Procedures/Meds     Pertinent Labs Reviewed: reviewed  Pertinent Labs Comments: CO2 30, BUN 20, Cr 1.5, Glu 102, .4, Alb 2.5  Pertinent Medications Reviewed: reviewed     Physical Findings    Overall Physical Appearance: obese      Skin: pressure ulcer(s) (stage IV)    Anthropometrics     Height " (inches): 72.99 in  Weight Method: Stated  Weight (kg): (!) 149.7 kg  Ideal Body Weight (IBW), Male: 183.94 lb     % Ideal Body Weight, Male (lb): 179.42 lb     BMI (kg/m2): 43.55  BMI Grade: greater than 40 - morbid obesity     Estimated/Assessed Needs    Weight Used For Calorie Calculations: (!) 149.7 kg (330 lb 0.5 oz)   Height (cm): 185.4 cm     Energy Need Method: [place]-St Jeor (2767 kcal (PAL x1.2))     RMR ([place]-St. Jeor Equation): 2314.85        Weight Used For Protein Calculations: 81.5 kg (179 lb 10.8 oz)  Protein Requirements:  g (1.2-1.5 g/kg)    Fluid Need Method: RDA Method     Monitor and Evaluation    Food and Nutrient Intake: energy intake, food and beverage intake  Food and Nutrient Adminstration: diet order     Physical Activity and Function: nutrition-related ADLs and IADLs  Anthropometric Measurements: weight change, body mass index, weight  Biochemical Data, Medical Tests and Procedures: electrolyte and renal panel, glucose/endocrine profile, lipid profile, inflammatory profile  Nutrition-Focused Physical Findings: overall appearance    Nutrition Risk    Level of Risk: moderate    Nutrition Follow-Up    RD Follow-up?: Yes    Nutrition Diagnosis    Increased nutrient needs related stage IV decub pressure ulcer as evidenced by pt requiring higher protein needs  Status: New

## 2017-03-19 NOTE — ASSESSMENT & PLAN NOTE
- noted in area overlying ex-fix  - orthopedic surgery consulted; they recommend strict elevation above heart at all times, and dressing recmomendations  - continue vancomycin for staph coverage, will consider pseudomonal coverage at another time with improvement of symptoms  - dermatology consulted, likely drug reaction with stasis/contact dermatitis on fixator pin in 5th metatarsal. Triamcinolone ointment for top of foot.

## 2017-03-19 NOTE — PROGRESS NOTES
Chart review:  Afebrile and WBC WNL.  NAT stable.  Vanc trough tonight.  No growth on pin cultures.  Will see Monday.    Mike Parmar PA-C  Pager [phone]

## 2017-03-19 NOTE — ASSESSMENT & PLAN NOTE
"- Wound care consult.  - from last ID progress note while patient was inpatient:   "left heel ulceration due to the accident, so the plan was to maintain the external fixator until this ulcer healed. From the ulcer grew Pseudomonas and prevotella spp., so he was started on IV treatment with pip/tazo and ciprofloxacin, then after 1+ weeks on this treatment, he developed fevers and diffuse maculopapular rash which were concerning for possible drug reaction. He was seen by Dr Perry in the outpatient [facility] on 2/20, and he came with a list of daily fevers from 2/18 - 2/20. The ulcer itself was not completed healed but exhibited no clear sign of infection, so his antibiotics were discontinued and his PICC line pulled on 2/20/17. Blood and urine cultures from 2/20 are NGTD, and cath tip cx revealed no growth as well"     "

## 2017-03-20 ENCOUNTER — OUTPATIENT CASE MANAGEMENT (OUTPATIENT)
Dept: ADMINISTRATIVE | Facility: OTHER | Age: 72
End: 2017-03-20

## 2017-03-20 LAB
ALBUMIN SERPL BCP-MCNC: 2.4 G/DL
ALP SERPL-CCNC: 135 U/L
ALT SERPL W/O P-5'-P-CCNC: 91 U/L
ANION GAP SERPL CALC-SCNC: 11 MMOL/L
AST SERPL-CCNC: 68 U/L
BACTERIA BLD CULT: NORMAL
BACTERIA BLD CULT: NORMAL
BACTERIA SPEC AEROBE CULT: NO GROWTH
BASOPHILS # BLD AUTO: 0.03 K/UL
BASOPHILS NFR BLD: 0.3 %
BILIRUB SERPL-MCNC: 0.5 MG/DL
BUN SERPL-MCNC: 21 MG/DL
CALCIUM SERPL-MCNC: 8.3 MG/DL
CHLORIDE SERPL-SCNC: 104 MMOL/L
CO2 SERPL-SCNC: 24 MMOL/L
CREAT SERPL-MCNC: 1.4 MG/DL
DIFFERENTIAL METHOD: ABNORMAL
EBV DNA SPEC QL NAA+PROBE: NOT DETECTED
EOSINOPHIL # BLD AUTO: 1.1 K/UL
EOSINOPHIL NFR BLD: 13 %
ERYTHROCYTE [DISTWIDTH] IN BLOOD BY AUTOMATED COUNT: 13.4 %
EST. GFR  (AFRICAN AMERICAN): 58.4 ML/MIN/1.73 M^2
EST. GFR  (NON AFRICAN AMERICAN): 50.5 ML/MIN/1.73 M^2
GLUCOSE SERPL-MCNC: 105 MG/DL
HCT VFR BLD AUTO: 28.5 %
HGB BLD-MCNC: 9.8 G/DL
LYMPHOCYTES # BLD AUTO: 1.6 K/UL
LYMPHOCYTES NFR BLD: 18 %
MAGNESIUM SERPL-MCNC: 1.5 MG/DL
MCH RBC QN AUTO: 31.5 PG
MCHC RBC AUTO-ENTMCNC: 34.4 %
MCV RBC AUTO: 92 FL
MONOCYTES # BLD AUTO: 0.5 K/UL
MONOCYTES NFR BLD: 5.7 %
NEUTROPHILS # BLD AUTO: 5.5 K/UL
NEUTROPHILS NFR BLD: 62.8 %
PLATELET # BLD AUTO: 233 K/UL
PMV BLD AUTO: 9.2 FL
POTASSIUM SERPL-SCNC: 4.1 MMOL/L
PROT SERPL-MCNC: 5.9 G/DL
RBC # BLD AUTO: 3.11 M/UL
SODIUM SERPL-SCNC: 139 MMOL/L
VANCOMYCIN SERPL-MCNC: 25.5 UG/ML
WBC # BLD AUTO: 8.71 K/UL

## 2017-03-20 PROCEDURE — 25000003 PHARM REV CODE 250

## 2017-03-20 PROCEDURE — 63600175 PHARM REV CODE 636 W HCPCS: Performed by: STUDENT IN AN ORGANIZED HEALTH CARE EDUCATION/TRAINING PROGRAM

## 2017-03-20 PROCEDURE — 99233 SBSQ HOSP IP/OBS HIGH 50: CPT | Mod: GC,,, | Performed by: HOSPITALIST

## 2017-03-20 PROCEDURE — 83735 ASSAY OF MAGNESIUM: CPT

## 2017-03-20 PROCEDURE — 25000003 PHARM REV CODE 250: Performed by: HOSPITALIST

## 2017-03-20 PROCEDURE — 25000003 PHARM REV CODE 250: Performed by: INTERNAL MEDICINE

## 2017-03-20 PROCEDURE — 80202 ASSAY OF VANCOMYCIN: CPT

## 2017-03-20 PROCEDURE — 80053 COMPREHEN METABOLIC PANEL: CPT

## 2017-03-20 PROCEDURE — 85025 COMPLETE CBC W/AUTO DIFF WBC: CPT

## 2017-03-20 PROCEDURE — 11000001 HC ACUTE MED/SURG PRIVATE ROOM

## 2017-03-20 PROCEDURE — 99232 SBSQ HOSP IP/OBS MODERATE 35: CPT | Mod: ,,, | Performed by: NURSE PRACTITIONER

## 2017-03-20 PROCEDURE — 97530 THERAPEUTIC ACTIVITIES: CPT

## 2017-03-20 RX ORDER — DIPHENHYDRAMINE HCL 25 MG
25 CAPSULE ORAL EVERY 6 HOURS PRN
Refills: 0 | COMMUNITY
Start: 2017-03-20 | End: 2017-12-06

## 2017-03-20 RX ORDER — GABAPENTIN 300 MG/1
600 CAPSULE ORAL DAILY
Status: DISCONTINUED | OUTPATIENT
Start: 2017-03-20 | End: 2017-03-23 | Stop reason: HOSPADM

## 2017-03-20 RX ORDER — TRIAMCINOLONE ACETONIDE 1 MG/G
CREAM TOPICAL 2 TIMES DAILY
Start: 2017-03-20 | End: 2017-03-28

## 2017-03-20 RX ORDER — ASPIRIN 325 MG
325 TABLET ORAL DAILY
Refills: 0 | COMMUNITY
Start: 2017-03-20 | End: 2018-01-17 | Stop reason: DRUGHIGH

## 2017-03-20 RX ORDER — CETIRIZINE HYDROCHLORIDE 10 MG/1
10 TABLET ORAL DAILY
Refills: 0 | COMMUNITY
Start: 2017-03-20 | End: 2017-12-06

## 2017-03-20 RX ORDER — DOXYLAMINE SUCCINATE 25 MG
TABLET ORAL 2 TIMES DAILY
Refills: 0 | COMMUNITY
Start: 2017-03-20 | End: 2017-12-06

## 2017-03-20 RX ORDER — MAGNESIUM SULFATE HEPTAHYDRATE 40 MG/ML
2 INJECTION, SOLUTION INTRAVENOUS ONCE
Status: COMPLETED | OUTPATIENT
Start: 2017-03-20 | End: 2017-03-20

## 2017-03-20 RX ADMIN — GABAPENTIN 600 MG: 300 CAPSULE ORAL at 01:03

## 2017-03-20 RX ADMIN — TRIAMCINOLONE ACETONIDE: 1 CREAM TOPICAL at 09:03

## 2017-03-20 RX ADMIN — OXYCODONE AND ACETAMINOPHEN 1 TABLET: 10; 325 TABLET ORAL at 12:03

## 2017-03-20 RX ADMIN — MAGNESIUM SULFATE IN WATER 2 G: 40 INJECTION, SOLUTION INTRAVENOUS at 09:03

## 2017-03-20 RX ADMIN — TRIAMCINOLONE ACETONIDE: 1 CREAM TOPICAL at 10:03

## 2017-03-20 RX ADMIN — TAMSULOSIN HYDROCHLORIDE 0.4 MG: 0.4 CAPSULE ORAL at 09:03

## 2017-03-20 RX ADMIN — VANCOMYCIN HYDROCHLORIDE 1000 MG: 1 INJECTION, POWDER, LYOPHILIZED, FOR SOLUTION INTRAVENOUS at 09:03

## 2017-03-20 RX ADMIN — Medication 10 ML: at 12:03

## 2017-03-20 RX ADMIN — Medication 3 ML: at 10:03

## 2017-03-20 RX ADMIN — Medication 3 ML: at 06:03

## 2017-03-20 RX ADMIN — GABAPENTIN 600 MG: 300 CAPSULE ORAL at 10:03

## 2017-03-20 RX ADMIN — MICONAZOLE NITRATE: 20 CREAM TOPICAL at 10:03

## 2017-03-20 RX ADMIN — Medication 10 ML: at 06:03

## 2017-03-20 RX ADMIN — MICONAZOLE NITRATE: 20 CREAM TOPICAL at 09:03

## 2017-03-20 RX ADMIN — ASPIRIN 325 MG ORAL TABLET 325 MG: 325 PILL ORAL at 09:03

## 2017-03-20 RX ADMIN — POLYETHYLENE GLYCOL 3350 17 G: 17 POWDER, FOR SOLUTION ORAL at 09:03

## 2017-03-20 RX ADMIN — PANTOPRAZOLE SODIUM 40 MG: 40 TABLET, DELAYED RELEASE ORAL at 09:03

## 2017-03-20 RX ADMIN — VENLAFAXINE 150 MG: 37.5 TABLET ORAL at 09:03

## 2017-03-20 RX ADMIN — OXYCODONE AND ACETAMINOPHEN 1 TABLET: 10; 325 TABLET ORAL at 09:03

## 2017-03-20 RX ADMIN — ATORVASTATIN CALCIUM 40 MG: 20 TABLET, FILM COATED ORAL at 09:03

## 2017-03-20 RX ADMIN — VENLAFAXINE 150 MG: 37.5 TABLET ORAL at 10:03

## 2017-03-20 RX ADMIN — CETIRIZINE HYDROCHLORIDE 10 MG: 10 TABLET, FILM COATED ORAL at 09:03

## 2017-03-20 RX ADMIN — GABAPENTIN 600 MG: 300 CAPSULE ORAL at 09:03

## 2017-03-20 NOTE — PLAN OF CARE
RACHID provided pt with [employer] SNF list. RACHID updated pt that Astrid informed us (JOSEPH/RACHID) that he still has some skilled days left but first 20 days are used. Pt verbalized understanding and stated that his wife will call RACHID with choices in the morning.

## 2017-03-20 NOTE — LETTER
March 20, 2017    Janusz Montgomery Jr.  [address] [facility]  [place] [address]             Outpatient Case Management  [address]  [facility] [address] Dear Janusz Montgomery Jr.:    We understand that receiving many services from different doctors and healthcare providers is overwhelming. There are appointments to make, transportation to arrange, dietary instructions to understand, and new medications to obtain.    This is where [facility] can help.     You are eligible to receive Outpatient Case Management services when you have healthcare needs that require the coordination of many providers, treatments, and services. You also qualify if you need assistance with a new treatment plan.     There is no charge for this support. You may have been referred to this program from your doctor(s), hospital staff member(s), or insurance company but you always have a choice to participate or not participate. To participate, you must give us your permission to be enrolled.     When you are enrolled in the [facility] program, the  who is assigned to you is    Delfina Sykes, RN  [phone]    Depending on your needs and wishes, your  may speak with you by phone, visit you at your place of living (for example your home, skilled nursing facility, or rehabilitation facility), or meet you at your doctors office.     Your  will tell you why you have been selected to participate in the program and will complete an assessment of your needs. Then a personalized plan of care will be developed with you and or your caregiver.             Here are examples of the services your [facility]  provides.     Coordinate communication among multiple providers.   Arrange for transportation, doctors visits, durable medical equipment, home care services, and special clinics.    Provide coaching on how to manage your health condition.     Answer questions about your health condition.   Help you understand your doctors treatment plan.    Provide additional instruction about your health condition, treatments, and medications.    Help you obtain information about your insurance coverage.    Advocate for your individual needs.    Request a Licensed Clinical  (LCSW) to visit you if you need their services. LCSWs help with long term planning (discussing placement options, advanced planning directives), financial planning, and assistance (for example rent, utilities, medication funding).     Your  will coordinate their activities with other outpatient services you are receiving. All services provided by [facility]  are coordinated with and communicated to your primary care physician.    Our goal is to help you manage your health condition(s) safely within your living environment, whether that is your home or a medical facility. We want to help you function at the healthiest and highest level possible.     Sincerely,      Tom Iqbal MD  Medical Director    Enclosures:    Frequently Asked Questions  Patient Rights and Responsibilities   Reporting a Grievance or Complaint  Consent/Release of Information  Stamped Addressed Envelope                  Frequently Asked Questions about [facility]    What is [facility]?  Outpatient Case management is not Home healthcare services. [facility]  do not provide hands-on care. [facility]  will work with your doctor to arrange for home health services, if needed. Home health services have a limited duration and there are some restrictions as to who can get these services. There is no prescribed limit to the amount of time you receive [facility] services. [facility]  are not agents of your insurance company. However, [facility]  can help you obtain information from your insurance company.     Who are the [facility] ?  [facility]  are Registered Nurses and Social Workers. It is important to remember that you and your  are a team that works together with your primary care physician to create your individualized plan of care. The ultimate goal of your care plan is to help you implement your doctors treatment plan and to help you function at the highest level of health possible.     What are my rights as a patient?  It is important for you to know and understand your rights and responsibilities while receiving services from the [facility] program. We have enclosed a complete description of your rights and responsibilities. You can help to make your care more effective when you understand your right and responsibilities.     What is needed to be enrolled in the program?  You are only enrolled in the [facility] Program when you give us your consent to participate. You will find enclosed a consent form. You are receiving this letter because you or your caregivers have given us a verbal consent to enroll you in [facility] Program. We ask that you sign and return the enclosed written consent in the stamped self-addressed envelope.                           Patient Rights and Responsibilities    We consider you a partner in your care. When you are well informed, participate in treatment decisions and communicate openly with your doctor and other healthcare professionals, you help make your care more effective.     While you are in the Outpatient Case Management Program, your rights include the following:     You have a right to be provided services in a non-discriminatory manner in accordance with the provisions of Title VI of the Civil Rights Act of 1964, Section 504 of the Rehabilitation Act of  1973, the Age Discrimination Act of 1975, the Americans with Disabilities Act as well as any other applicable Federal and State laws and regulations.     You have the right to a reasonable, timely response to your request or need for care, as well as the right to considerate and respectful care including an environment that preserves dignity and contributes to a positive self-image. You are responsible for being considerate and respectful of our staff.     You have a right to information regarding patient rights, advocacy services and complaint mechanisms, and the right to prompt resolution of any complaint. You or a designee has the right to participate in the resolution of ethical issues surrounding your care. You have a right to file a complaint if you feel that your rights have been infringed, without fear or penalty from [facility] or the federal government. You may file a complaint with the Director of Outpatient Case Management by calling [phone]. At any time, you may lodge a grievance with the [facility] and [facility] by calling [phone], or the Joint Commission on Accreditation of Healthcare Organizations at [phone].     You, or someone acting on your behalf, have the right to understandable information on your health status, treatment and progress in order to make decisions. You have the right to know the nature, risks and alternatives to treatment. You have the right to be informed, when appropriate, regarding the outcome of the care that has been provided. You have the right to refuse treatment to the extent permitted by law, and the right to be informed of the alternatives and consequences of refusing treatment.     You, in collaboration with your physician, have the right to make decisions regarding care and the right to participate in the development and implementation of the plan of care and effective pain management. You have the right to know the name and  professional status of those responsible for the delivery of your care and treatment.       You have a right, within legal guidelines, to have a guardian, next-of-kin or legal designee exercises your patient rights when you are unable to do so. You have the right for your wishes regarding end-of-life decisions to be addressed by the healthcare team through advance directives. You have the right to personal privacy and confidentiality and to expect confidentiality of all records and communications pertaining to your care.      You have the right to receive communications about your health information confidentially. You have the right to request restrictions on the uses and disclosures of your health information. You have the right to inspect, copy, request amendments and receive an accounting of to whom we have disclosed your health information.     You have the right to be provided with interpretation services if you do not speak English; to alternative communication techniques if you are hearing or vision impaired; and to have any other resources needed on your behalf to ensure effective communication. These services are provided free of charge.     You have a right to personal safety (free from mental, physical, sexual and verbal abuse, neglect and exploitation). You have the right to access protective and advocacy services.     Advance Directives  A Patient Advocate is available to meet with patients to answer questions regarding advance directives.    Living Will  A document that outlines what medical treatment the patient does or does not want in the event the patient becomes unable to make those decisions at the appropriate time.    Durable Medical Power of   A document by which the patient designates an individual to be responsible for making medical decisions in the event the patient becomes unable to do so.    HIPAA Notice of Privacy Practices  Your medical information is governed by federal  privacy laws. HIPAA protects private medical information and how that information is disclosed. If you have a question regarding the HIPAA Notice of Privacy Practices, or if you believe your privacy rights have been violated, you may call our designated hotline at ([phone].            Quality Improvement  Because we consistently strive to improve the care and service provided to our patients, we welcome your feedback. Your comments are an important part of our quality improvement process, as we like to know what we are doing right and which areas are in need of improvement. Our policy is to listen, be responsive and provide you with an appropriate and timely follow-up to your questions or concerns. Our goal is active patient and family involvement in all aspects of the care process.                                                                                  Reporting a Grievance or Complaint    During your time with the [facility] team you may have a grievance or complaint with our services. Your Patients Bill of Rights gives patients, families, and caregivers the right to express concerns and grievances and the right to expect a reasonable and timely response.     Your presentation of your concerns is not viewed negatively. It is an opportunity for us to improve the quality of our care and services we provide to you.     You may report your concerns directly to your , or you can phone in a complaint to:     Director of Outpatient Case Management  [phone]    You may also send a complaint letter to:    Director of Outpatient Case Management Services  [address] [place] [id number]    Tell us the details of your complaint and provide us with a contact phone number so we can contact you to obtain additional information. We will return a call to you within two business days of our receipt of your complaint, and to request additional information as  needed. If you choose to mail a letter, your complaint may take a few days longer to reach us.     All grievances will be addressed as quickly as possible. A grievance or complaint that involves situations or practices which place patients in immediate danger will be addressed as an urgent matter. We will work to resolve all other complaints within seven days of receipt. By that time, you will receive a phone call with either the resolution of your complaint, or a plan for corrective action. A formal written response will be sent to you within 30 days of receipt of your grievance.     If a resolution cannot be completed within 30 days, a letter will be sent to you or your family member with an estimated time for the final response.    Additionally, all patients have the right to file complaints with external agencies, without exception. Complaints/grievances can be addressed to the following agencies:            Patient Safety or Quality of Care Concerns  Office of Quality Monitoring   [facility] [place] [address]  [phone] Toll Free    HIPPA Privacy/Security Concerns  Office for Civil Rights Region IV  [place] Department of Health & Human Services  [address] [place] [address], [address]  [place] [address] [phone] Phone  [phone] TDD  [phone] Toll Free    Medicare/Medicaid Billing Concerns  [place] for Medicare & Medicaid Services  Region [address]  [address] [place] [address], [address]  [place] [address] [phone] Phone  [phone] Toll Free    General Concerns  [place] [facility] ([facility])  [phone] Toll Free Complaint Hotline                                                              Consent Form/Release of Information    By signing--     (1) I agree I have read the Outpatient Case Management information provided to me;     (2) I agree to voluntarily participate in the Outpatient Case Management program;     (3) I understand I  must consent to participation in the Outpatient Case Management program during my first interview with my ;    (4) I consent to the discussion and release of my personal health information to my healthcare team (including my personal physician, my medical home care team, any specialty physician(s), and my [facility] team);     (5) I agree my consent is valid for the length of time I am receiving Outpatient Case Management;    (6) I agree to referrals to community resources which my Case Management team recommends for me. I agree to the release of my personal information and personal health information as necessary to referral sources.    ___________________________________________________________________  Patients Printed Name     ___________________________________________________________________  Patients Signature       Date    If patient is in being cared for, please complete this section:     ___________________________________________________________________  Printed Name of Person Caring For Patient   Relationship To Patient    ___________________________________________________________________   Signature of Person Caring For Patient     Date    PLEASE SIGN AND RETURN IN THE ENCLOSED PRE-ADDRESSED ENVELOPE.

## 2017-03-20 NOTE — PT/OT/SLP PROGRESS
Physical Therapy  Treatment    Janusz Montgomery Jr.   MRN: [id number]   Admitting Diagnosis: Sepsis    PT Received On: 17  PT Start Time: 1435     PT Stop Time: 1450    PT Total Time (min): 15 min       Billable Minutes:  Therapeutic Activity 15    Treatment Type: Treatment  PT/PTA: PT       General Precautions: Standard, fall  Orthopedic Precautions: LLE non weight bearing   Braces:  (LLE external fixator)    Subjective:  Communicated with RN prior to session.  Pt agreeable to PT treatment.     Pain Ratin/10  Pain Rating Post-Intervention: 0/10    Objective:   Patient found supine in bed, family present with: PICC line (external fixator)    Functional Mobility:  Bed Mobility:   Supine to Sit: Stand by Assistance (with HOB elevated)  Sit to Supine:  (not assessed 2/2 pt up in wheelchair at end of session)    Transfers:  Sit <> Stand Assistance: Stand By Assistance  Sit <> Stand Assistive Device: No Assistive Device  Bed <> Chair Technique: Stand Pivot  Bed <> Chair Assistance: Contact Guard Assistance  Pt maintained LLE NWB status.    Gait:   Gait Distance: not performed this session    Balance:   Static Sit: FAIR+: Able to take MINIMAL challenges from all directions  Dynamic Sit: FAIR+: Maintains balance through MINIMAL excursions of active trunk motion  Static Stand: FAIR: Maintains without assist but unable to take challenges  Dynamic stand: FAIR: Needs CONTACT GUARD during gait     Therapeutic Activities and Exercises:  Pt verbalized understanding of LLE NWB status without cueing from PT.   Pt performed stand pivot transfer bed to wheelchair with CGA.  Pt demonstrated safety with locking brakes on wheelchair.  Pt performed wheelchair propulsion with BUEs and RLE x 60 ft x 2 trials with seated rest break between trials x 1 minute supervision while in hospital hallway.  Pt reversed wheelchair into hospital room and performed pivot turn with CGA.  Verbal cues for safety through doorway and with turns. Verbal  instruction on locking brakes while seated in room.  Pt demonstrated safety with adjusting leg rests.  Pt finished session seated in wheelchair with LLE elevated.  Verbal instruction on calling for assist to return to bed; white board updated transfer with assist x 1.    AM-PAC 6 CLICK MOBILITY  How much help from another person does this patient currently need?   1 = Unable, Total/Dependent Assistance  2 = A lot, Maximum/Moderate Assistance  3 = A little, Minimum/Contact Guard/Supervision  4 = None, Modified [place]/Independent    Turning over in bed (including adjusting bedclothes, sheets and blankets)?: 3  Sitting down on and standing up from a chair with arms (e.g., wheelchair, bedside commode, etc.): 3  Moving from lying on back to sitting on the side of the bed?: 3  Moving to and from a bed to a chair (including a wheelchair)?: 3  Need to walk in hospital room?: 3  Climbing 3-5 steps with a railing?: 1  Total Score: 16    AM-PAC Raw Score CMS G-Code Modifier Level of Impairment Assistance   6 % Total / Unable   7 - 9 CM 80 - 100% Maximal Assist   10 - 14 CL 60 - 80% Moderate Assist   15 - 19 CK 40 - 60% Moderate Assist   20 - 22 CJ 20 - 40% Minimal Assist   23 CI 1-20% SBA / CGA   24 CH 0% Independent/ Mod I     Patient left up in chair with all lines intact, call button in reach and family present.    Assessment:  Janusz Montgomery Jr. is a 72 y.o. male with a medical diagnosis of Sepsis and presents with below impairments.  He demonstrated safety with maintaining LLE NWB status.  Noted improvement in bed mobility, sit to stand, and stand pivot transfer this session.  He performed wheelchair propulsion for endurance activity with supervision - CGA.  He will continue to benefit from acute PT intervention to address below impairments.      Rehab identified problem list/impairments: Rehab identified problem list/impairments: weakness, impaired endurance, impaired self care skills, impaired functional  mobilty, decreased lower extremity function, decreased ROM, impaired joint extensibility, impaired balance, impaired skin    Rehab potential is good.    Activity tolerance: Good    Discharge recommendations: Discharge Facility/Level Of Care Needs: home health PT     Barriers to discharge: Barriers to Discharge: None    Equipment recommendations: Equipment Needed After Discharge: none     GOALS:   Physical Therapy Goals        Problem: Physical Therapy Goal    Goal Priority Disciplines Outcome Goal Variances Interventions   Physical Therapy Goal     PT/OT, PT Ongoing (interventions implemented as appropriate)     Description:  Goals to be met by: 2017     Patient will increase functional independence with mobility by performin. Supine to sit with supervision  2. Sit to supine with supervision  3. Sit to stand transfer with Supervision  4. Bed to chair transfer with Stand-by Assistance   5. Gait  x 10 feet with Stand-by Assistance using Rolling Walker.   5. Wheelchair propulsion x100 feet with Supervision using bilateral uppper extremities                PLAN:    Patient to be seen 4 x/week  to address the above listed problems via therapeutic activities, gait training, therapeutic exercises, wheelchair management/training  Plan of Care expires: 17  Plan of Care reviewed with: patient          Kandice Keyshawn, PT, DPT  3/20/2017  Pager: [phone]

## 2017-03-20 NOTE — PROGRESS NOTES
An OPCM welcome Packet and consent form was created and mailed to the patient on 3/20/17        Thank you,  Melani Renee, [facility]  [facility] Ext. [phone]

## 2017-03-20 NOTE — PLAN OF CARE
Problem: Physical Therapy Goal  Goal: Physical Therapy Goal  Goals to be met by: 2017     Patient will increase functional independence with mobility by performin. Supine to sit with supervision  2. Sit to supine with supervision  3. Sit to stand transfer with Supervision  4. Bed to chair transfer with Stand-by Assistance   5. Gait x 10 feet with Stand-by Assistance using Rolling Walker.   5. Wheelchair propulsion x100 feet with Supervision using bilateral uppper extremities   Outcome: Ongoing (interventions implemented as appropriate)  Continue to progress with PT intervention.     Kandice Mahajan, PT, DPT  3/20/2017  Pager: [phone]

## 2017-03-20 NOTE — SUBJECTIVE & OBJECTIVE
Interval History:  No acute events overnight.  No worsening of rash, skin eruption.  Afebrile. No leukocytosis.  Creatinine 1.4.   Reports overall he feels much improved.  Cultures of 5th metatarsal pin site - NGTD.       Review of Systems   Constitutional: Negative for chills and fever.   Respiratory: Negative for cough and shortness of breath.    Cardiovascular: Negative for chest pain.   Gastrointestinal: Negative for abdominal pain, diarrhea, nausea and vomiting.   Genitourinary: Negative for dysuria.   Musculoskeletal: Negative for back pain.   Skin: Positive for rash and wound.   Neurological: Negative for numbness and headaches.     Objective:     Vital Signs (Most Recent):  Temp: 98.4 °F (36.9 °C) (03/20/17 0901)  Pulse: 78 (03/20/17 0950)  Resp: 18 (03/20/17 0901)  BP: (!) 115/58 (03/20/17 0901)  SpO2: (!) 94 % (03/20/17 0901) Vital Signs (24h Range):  Temp:  [97.6 °F (36.4 °C)-98.5 °F (36.9 °C)] 98.4 °F (36.9 °C)  Pulse:  [73-97] 78  Resp:  [16-20] 18  SpO2:  [94 %-97 %] 94 %  BP: (115-151)/(54-74) 115/58     Weight: (!) 149.7 kg (330 lb)  Body mass index is 43.54 kg/(m^2).    Estimated Creatinine Clearance: 74.9 mL/min (based on Cr of 1.4).    Physical Exam   Constitutional: He is oriented to person, place, and time. He appears well-developed and well-nourished. No distress.   HENT:   Head: Normocephalic and atraumatic.   Mouth/Throat: Oropharynx is clear and moist. No oropharyngeal exudate.   No facial or lip swelling. No mucosal lesions noted.   Eyes: EOM are normal. No scleral icterus.   Neck: Normal range of motion.   Cardiovascular: Normal rate, regular rhythm and normal heart sounds.    Pulmonary/Chest: Effort normal and breath sounds normal. No respiratory distress.   Abdominal: Soft. Bowel sounds are normal. He exhibits no distension. There is no tenderness.   Musculoskeletal: He exhibits edema (left lower leg - improved).   No significant erythema or warmth of left lower leg.  Mild edema.   Overall much improved.   Patch over dorsum of foot much improved.  External fixator in place.  No active drainage noted from pins.     Left heel ulcer clean, no active drainage/purulence, no malodor.     Neurological: He is alert and oriented to person, place, and time.   Skin: Skin is warm and dry. Rash noted. He is not diaphoretic.   Rash over face, trunk, bilateral extremities resolving.     PICC LUE - c/d/i   Psychiatric: He has a normal mood and affect. His behavior is normal.       Significant Labs:   Blood Culture:   Recent Labs  Lab 02/20/17  1150 02/24/17  1324 02/24/17  1334 03/14/17  2341 03/15/17  0045   LABBLOO No growth after 5 days. No growth after 5 days. No growth after 5 days. No growth after 5 days. No growth after 5 days.     CBC:   Recent Labs  Lab 03/19/17  0400 03/20/17  0449   WBC 9.54 8.71   HGB 10.0* 9.8*   HCT 29.9* 28.5*    233     CMP:   Recent Labs  Lab 03/19/17  0400 03/20/17  0449    139   K 4.0 4.1    104   CO2 30* 24    105   BUN 20 21   CREATININE 1.5* 1.4   CALCIUM 8.9 8.3*   PROT 6.2 5.9*   ALBUMIN 2.5* 2.4*   BILITOT 0.5 0.5   ALKPHOS 126 135   AST 63* 68*   ALT 78* 91*   ANIONGAP 8 11   EGFRNONAA 46.5* 50.5*     Urine Culture:   Recent Labs  Lab 02/18/17  1346 02/24/17  1340 03/15/17  0114   LABURIN No growth ENTEROCOCCUS FAECIUM VRE10,000 - 49,999 cfu/ml No growth     Urine Studies:   Recent Labs  Lab 03/17/17  1345   COLORU Yellow   APPEARANCEUA Hazy*   PHUR 6.0   SPECGRAV 1.005   PROTEINUA Negative   GLUCUA Negative   KETONESU Negative   BILIRUBINUA Negative   OCCULTUA 1+*   NITRITE Negative   UROBILINOGEN Negative   LEUKOCYTESUR Negative   RBCUA 0   WBCUA 0   BACTERIA Occasional   SQUAMEPITHEL 0     Wound Culture:   Recent Labs  Lab 12/28/16  1308 02/07/17  1310 03/15/17  1517 03/17/17  1121   LABAERO PSEUDOMONAS AERUGINOSAModerateSkin mynor also present PSEUDOMONAS AERUGINOSAModerate Skin mynor,  no predominant organism No growth       Significant  Imaging: None

## 2017-03-20 NOTE — PROGRESS NOTES
Notified Dr. Mccabe that vanc trough drawn at 1840 was high at 25.8. No new orders, 2000 dose of vanc administered. Will continue to monitor.

## 2017-03-20 NOTE — PROGRESS NOTES
Received call from Mrs. Montgomery. She states pt remains inpatient. She is not sure if pt will qualify for SNF or if he will go home but he will require IV antibiotics. She states she is going to the hospital in the morning and will meet with CM to discuss. Will continue to follow.
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
## 2017-03-20 NOTE — PLAN OF CARE
PLAN IS TO REFER TO SNF FOR IV ANTIBIOTICS AND BID WOUND CARTE     03/20/17 1301   Discharge Reassessment   Assessment Type Discharge Planning Reassessment   Can the patient answer the patient profile reliably? Yes, cognitively intact   How does the patient rate their overall health at the present time? Fair   Describe the patient's ability to walk at the present time. Major restrictions/daily assistance from another person

## 2017-03-20 NOTE — SUBJECTIVE & OBJECTIVE
Interval History: CHANG VSS.  Denies fever/chills/pain.  Patient's vanc trough was 25.8 last night, vanc dose reduced from 1.5g BID to 1g BID.  Currently working on placement, patient is out of SNF days with insurance.    Review of Systems   Constitutional: Negative for chills and fever.   Respiratory: Negative for cough, shortness of breath and wheezing.    Cardiovascular: Negative for chest pain, palpitations and leg swelling.   Gastrointestinal: Negative for abdominal pain, constipation, diarrhea, nausea and vomiting.   Genitourinary: Positive for discharge. Negative for difficulty urinating and dysuria.     Objective:     Vital Signs (Most Recent):  Temp: 97.7 °F (36.5 °C) (03/20/17 1204)  Pulse: 75 (03/20/17 1300)  Resp: 18 (03/20/17 1204)  BP: (!) 117/53 (03/20/17 1204)  SpO2: (!) 93 % (03/20/17 1204) Vital Signs (24h Range):  Temp:  [97.6 °F (36.4 °C)-98.5 °F (36.9 °C)] 97.7 °F (36.5 °C)  Pulse:  [74-97] 75  Resp:  [16-20] 18  SpO2:  [93 %-97 %] 93 %  BP: (115-151)/(53-67) 117/53     Weight: (!) 149.7 kg (330 lb)  Body mass index is 43.54 kg/(m^2).    Intake/Output Summary (Last 24 hours) at 03/20/17 1447  Last data filed at 03/20/17 0050   Gross per 24 hour   Intake             1660 ml   Output             2575 ml   Net             -915 ml      Physical Exam   Constitutional: He appears well-developed and well-nourished. No distress.   Neck: Normal range of motion. Neck supple.   Cardiovascular: Normal rate, regular rhythm, normal heart sounds and intact distal pulses.  Exam reveals no gallop and no friction rub.    No murmur heard.  Pulmonary/Chest: Effort normal and breath sounds normal. No respiratory distress. He has no wheezes. He has no rales.   Abdominal: Soft. Bowel sounds are normal. He exhibits no distension. There is no tenderness.   Musculoskeletal:   LLE ex fix. No ooze or pus from pins   Lymphadenopathy:     He has no cervical adenopathy.   Skin: Rash (improving) noted.       Significant Labs:    CBC:   Recent Labs  Lab 03/19/17  0400 03/20/17  0449   WBC 9.54 8.71   HGB 10.0* 9.8*   HCT 29.9* 28.5*    233     CMP:   Recent Labs  Lab 03/19/17  0400 03/20/17  0449    139   K 4.0 4.1    104   CO2 30* 24    105   BUN 20 21   CREATININE 1.5* 1.4   CALCIUM 8.9 8.3*   PROT 6.2 5.9*   ALBUMIN 2.5* 2.4*   BILITOT 0.5 0.5   ALKPHOS 126 135   AST 63* 68*   ALT 78* 91*   ANIONGAP 8 11   EGFRNONAA 46.5* 50.5*       Significant Imaging: No recent imaging

## 2017-03-20 NOTE — PLAN OF CARE
Problem: Patient Care Overview  Goal: Plan of Care Review  Outcome: Ongoing (interventions implemented as appropriate)  Pt AAOx4. VSS, afebrile. NSR on telemetry. Pain controlled with PRN meds. Pin site care done once during shift. Pt tolerating diet, no c/o nausea. Repositioning in bed independently. Voiding in urinal. Pt is free from falls/injury/trauma. Call bell within reach. No acute events, will continue to monitor.

## 2017-03-20 NOTE — PLAN OF CARE
JOSEPH contacted Lebron @ [facility] re Mr Ham remaining SNF days Lebron stated that Mr Montgomery does have SNF days but will have to pay an undetermined amount of co pay.The above information was relayed to RACHID and the resident Akila Marino
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
## 2017-03-20 NOTE — PLAN OF CARE
[facility]     Department of Hospital Medicine     [address] [place] [address]     [phone] after hours  [phone] fax       NURSING HOME ORDERS    03/20/2017    Admit to Nursing Home: Skilled Bed                                                 Diagnoses:  Active Hospital Problems    Diagnosis  POA    *Sepsis [A41.9]  Yes    NAT (acute kidney injury) [N17.9]  No     - Cr 1.0 on admission. Increased to 1.6 after pt had drug reaction event  - Negative mark stain  -Cr stable at 1.5 today  - Renally dose medications  - Avoid nephrotoxic agents if possible  - Monitor I/O      Cellulitis of left lower extremity [L03.116]  Yes    Drug allergy, anti-infective [Z88.3]  Not Applicable    Cellulitis of left upper extremity [L03.114]  Yes    Cellulitis [L03.90]  Yes    Non-pressure chronic ulcer of other part of left foot with unspecified severity [L97.529]  Yes    Morbid obesity with BMI of 40.0-44.9, adult [E66.01, Z68.41]  Not Applicable    Anxiety [F41.9]  Yes    GERD (gastroesophageal reflux disease) [K21.9]  Yes    Essential hypertension [I10]  Yes    Hyperlipidemia [E78.5]  Yes    Coronary artery disease involving native coronary artery of native heart without angina pectoris s/p RCA stent [I25.10]  Yes      Resolved Hospital Problems    Diagnosis Date Resolved POA   No resolved problems to display.       Patient is homebound due to:  Sepsis    Allergies:  Review of patient's allergies indicates:   Allergen Reactions    Ciprofloxacin Rash     Diffuse pruritic morbilliform rash developed 3/15/2017 after dose of cipro; previously in 2/2017 he had rash/fevers after initiation of cipro    Zosyn [piperacillin-tazobactam] Anaphylaxis     Diffuse pruritic morbilliform rash developed 3/15/2017.  Then, 430am dose on 3/16 and rash worsened with SOB/tachypnea but no hypoxemia.     Bacitracin Itching and Rash     Violaceous rash in area of topical Tx.         Vitals:       Every shift (Skilled Nursing patients)    Diet: Regular    Acitivities:      - Up in a chair each morning as tolerated   - Ambulate with assistance to bathroom   - Scheduled walks once each shift (every 8 hours)    LABS:  Per facility protocol   CMP, CBC each month for 3 months     Nursing Precautions:      - Fall precautions per nursing home protocol        CONSULTS:      Physical Therapy to evaluate and treat     Occupational Therapy to evaluate and treat        MISCELLANEOUS CARE:      WOUND CARE:  Wound spray or saline for wound cleaning with all dressing changes.    All wounds to be measured with first dressing changes and every week.    L foot pin site care TWICE DAILY: mix 50% peroxide with 50% normal saline and clean all pin sites with gauze soaked in solution. Wrap pin sites with dry kerlex.      Foot Ulcer   Location: L heel  MWF: clean with normal saline, apply santyl to wound bed (nickel thick), apply kirsten on top, cover with dry gauze and secure with kerlex.    Home Infusion Therapy:   SN to perform Infusion Therapy/Central Line Care.  Review Central Line Care & Central Line Flush with patient.    Administer (drug and dose): Vancomycin 1 g q12h  Last dose: 4/28/17  Last dose given: 3/20/17 at 927                        Home dose due: 3/20/17 at 3200    Scrub the Hub: Prior to accessing the line, always perform a 30 second alcohol scrub  Each lumen of the central line is to be flushed at least daily with 10 mL Normal Saline and 3 mL Heparin flush (100 units/mL)  Skilled Nurse (SN) may draw blood from IV access  Blood Draw Procedure:   - Aspirate at least 5 mL of blood   - Discard   - Obtain specimen   - Change posiflow cap   - Flush with 20 mL Normal Saline followed by a                 3-5 mL Heparin flush (100 units/mL)  Central :   - Sterile dressing changes are done weekly and as needed.   - Use chlor-hexadine scrub to cleanse site, apply Biopatch to insertion  site,       apply securement device dressing   - Posi-flow caps are changed weekly and after EVERY lab draw.   - If sterile gauze is under dressing to control oozing,                 dressing change must be performed every 24 hours until gauze is not needed.        Medications: Discontinue all previous medication orders, if any. See new list below.     Janusz Montgomery Jr.   Home Medication Instructions LANCE:[id number]    Printed on:03/20/17 5659   Medication Information                      acetaminophen (TYLENOL) 325 MG tablet  Take 325 mg by mouth every 6 (six) hours as needed for Pain.             artificial tears (ISOPTO TEARS) 0.5 % ophthalmic solution  Place 1 drop into both eyes as needed.             aspirin 325 MG tablet  Take 1 tablet (325 mg total) by mouth once daily.             atorvastatin (LIPITOR) 40 MG tablet  TAKE ONE TABLET BY MOUTH ONCE DAILY             cetirizine (ZYRTEC) 10 MG tablet  Take 1 tablet (10 mg total) by mouth once daily.             collagenase ointment  Apply topically every Mon, Wed, Fri. Nursing to apply Santyl  nickel thick to wound bed and edges and cover with damp gauze (to activate santyl) daily and cover with telfa island dressing (mepore) over each pressure injury. Santyl has autolytic debridement properties and will promote healing.             CYANOCOBALAMIN, VITAMIN B-12, (VITAMIN B-12 ORAL)  Take 2,500 mcg by mouth once daily.             diclofenac sodium 1 % Gel  Apply 2 g topically once daily. Apply 2 g to left shoulder             diphenhydrAMINE (BENADRYL) 25 mg capsule  Take 1 each (25 mg total) by mouth every 6 (six) hours as needed for Itching.             gabapentin (NEURONTIN) 300 MG capsule  Take 1 capsule (300 mg total) by mouth 3 (three) times daily.             melatonin 3 mg Tab  Take 6 mg by mouth nightly as needed (for sleep).              miconazole (MICOTIN) 2 % cream  Apply topically 2 (two) times daily.             omeprazole (PRILOSEC) 20 MG  capsule  Take 2 capsules (40 mg total) by mouth once daily.             oxycodone-acetaminophen (PERCOCET)  mg per tablet  Take 1 tablet by mouth every 4 to 6 hours as needed for Pain.             tamsulosin (FLOMAX) 0.4 mg Cp24  Take 1 capsule (0.4 mg total) by mouth once daily.             triamcinolone acetonide 0.1% (KENALOG) 0.1 % cream  Apply topically 2 (two) times daily.             VANCOMYCIN HCL (VANCOMYCIN 1 G/250 ML D5W, READY TO MIX SYSTEM,)  Inject 250 mLs (1,000 mg total) into the vein every 12 (twelve) hours.             venlafaxine (EFFEXOR) 75 MG tablet  Take 2 tablets (150 mg total) by mouth 2 (two) times daily.             vitamin D 1000 units Tab  Take 1 tablet (1,000 Units total) by mouth once daily.             zinc sulfate (ZINCATE) 220 (50) mg capsule  Take 1 capsule (220 mg total) by mouth once daily.                       _________________________________  Akila Marino MD  03/20/2017

## 2017-03-21 PROBLEM — A41.9 SEPSIS: Status: RESOLVED | Noted: 2017-03-16 | Resolved: 2017-03-21

## 2017-03-21 LAB
ALBUMIN SERPL BCP-MCNC: 2.5 G/DL
ALP SERPL-CCNC: 141 U/L
ALT SERPL W/O P-5'-P-CCNC: 93 U/L
ANION GAP SERPL CALC-SCNC: 8 MMOL/L
ANISOCYTOSIS BLD QL SMEAR: SLIGHT
AST SERPL-CCNC: 65 U/L
BACTERIA SPEC ANAEROBE CULT: NORMAL
BASOPHILS # BLD AUTO: 0.03 K/UL
BASOPHILS NFR BLD: 0.3 %
BILIRUB SERPL-MCNC: 0.5 MG/DL
BILIRUB UR QL STRIP: NEGATIVE
BUN SERPL-MCNC: 23 MG/DL
CALCIUM SERPL-MCNC: 8.8 MG/DL
CHLORIDE SERPL-SCNC: 104 MMOL/L
CLARITY UR REFRACT.AUTO: CLEAR
CO2 SERPL-SCNC: 26 MMOL/L
COLOR UR AUTO: ABNORMAL
CREAT SERPL-MCNC: 1.8 MG/DL
CREAT UR-MCNC: 30 MG/DL
DIFFERENTIAL METHOD: ABNORMAL
EOSINOPHIL # BLD AUTO: 1.4 K/UL
EOSINOPHIL NFR BLD: 14.5 %
EOSINOPHIL URNS QL WRIGHT STN: NORMAL
ERYTHROCYTE [DISTWIDTH] IN BLOOD BY AUTOMATED COUNT: 13.7 %
EST. GFR  (AFRICAN AMERICAN): 43.1 ML/MIN/1.73 M^2
EST. GFR  (NON AFRICAN AMERICAN): 37.3 ML/MIN/1.73 M^2
GLUCOSE SERPL-MCNC: 94 MG/DL
GLUCOSE UR QL STRIP: NEGATIVE
HCT VFR BLD AUTO: 30.9 %
HGB BLD-MCNC: 10.1 G/DL
HGB UR QL STRIP: NEGATIVE
KETONES UR QL STRIP: NEGATIVE
LEUKOCYTE ESTERASE UR QL STRIP: ABNORMAL
LYMPHOCYTES # BLD AUTO: 2.5 K/UL
LYMPHOCYTES NFR BLD: 25.1 %
MAGNESIUM SERPL-MCNC: 2.1 MG/DL
MCH RBC QN AUTO: 31.1 PG
MCHC RBC AUTO-ENTMCNC: 32.7 %
MCV RBC AUTO: 95 FL
MICROSCOPIC COMMENT: NORMAL
MONOCYTES # BLD AUTO: 0.4 K/UL
MONOCYTES NFR BLD: 4.5 %
NEUTROPHILS # BLD AUTO: 5.4 K/UL
NEUTROPHILS NFR BLD: 55.6 %
NITRITE UR QL STRIP: NEGATIVE
PH UR STRIP: 7 [PH] (ref 5–8)
PLATELET # BLD AUTO: 209 K/UL
PLATELET BLD QL SMEAR: ABNORMAL
PMV BLD AUTO: 9.7 FL
POTASSIUM SERPL-SCNC: 4.5 MMOL/L
PROT SERPL-MCNC: 6.2 G/DL
PROT UR QL STRIP: NEGATIVE
RBC # BLD AUTO: 3.25 M/UL
RBC #/AREA URNS AUTO: 0 /HPF (ref 0–4)
SODIUM SERPL-SCNC: 138 MMOL/L
SODIUM UR-SCNC: 67 MMOL/L
SP GR UR STRIP: 1 (ref 1–1.03)
TB INDURATION 48 - 72 HR READ: 0 MM
URN SPEC COLLECT METH UR: ABNORMAL
UROBILINOGEN UR STRIP-ACNC: NEGATIVE EU/DL
VANCOMYCIN TROUGH SERPL-MCNC: 23.5 UG/ML
WBC # BLD AUTO: 9.75 K/UL
WBC #/AREA URNS AUTO: 1 /HPF (ref 0–5)

## 2017-03-21 PROCEDURE — 25000003 PHARM REV CODE 250

## 2017-03-21 PROCEDURE — 83735 ASSAY OF MAGNESIUM: CPT

## 2017-03-21 PROCEDURE — 25000003 PHARM REV CODE 250: Performed by: INTERNAL MEDICINE

## 2017-03-21 PROCEDURE — 82570 ASSAY OF URINE CREATININE: CPT

## 2017-03-21 PROCEDURE — 80202 ASSAY OF VANCOMYCIN: CPT

## 2017-03-21 PROCEDURE — 63600175 PHARM REV CODE 636 W HCPCS: Performed by: STUDENT IN AN ORGANIZED HEALTH CARE EDUCATION/TRAINING PROGRAM

## 2017-03-21 PROCEDURE — 85025 COMPLETE CBC W/AUTO DIFF WBC: CPT

## 2017-03-21 PROCEDURE — 80053 COMPREHEN METABOLIC PANEL: CPT

## 2017-03-21 PROCEDURE — 99232 SBSQ HOSP IP/OBS MODERATE 35: CPT | Mod: ,,, | Performed by: NURSE PRACTITIONER

## 2017-03-21 PROCEDURE — 99233 SBSQ HOSP IP/OBS HIGH 50: CPT | Mod: GC,,, | Performed by: HOSPITALIST

## 2017-03-21 PROCEDURE — 84300 ASSAY OF URINE SODIUM: CPT

## 2017-03-21 PROCEDURE — 81001 URINALYSIS AUTO W/SCOPE: CPT

## 2017-03-21 PROCEDURE — 11000001 HC ACUTE MED/SURG PRIVATE ROOM

## 2017-03-21 PROCEDURE — 87205 SMEAR GRAM STAIN: CPT

## 2017-03-21 PROCEDURE — 25000003 PHARM REV CODE 250: Performed by: HOSPITALIST

## 2017-03-21 PROCEDURE — 25000003 PHARM REV CODE 250: Performed by: STUDENT IN AN ORGANIZED HEALTH CARE EDUCATION/TRAINING PROGRAM

## 2017-03-21 RX ADMIN — POLYETHYLENE GLYCOL 3350 17 G: 17 POWDER, FOR SOLUTION ORAL at 09:03

## 2017-03-21 RX ADMIN — Medication 3 ML: at 01:03

## 2017-03-21 RX ADMIN — ATORVASTATIN CALCIUM 40 MG: 20 TABLET, FILM COATED ORAL at 01:03

## 2017-03-21 RX ADMIN — Medication 10 ML: at 12:03

## 2017-03-21 RX ADMIN — ALTEPLASE 2 MG: 2.2 INJECTION, POWDER, LYOPHILIZED, FOR SOLUTION INTRAVENOUS at 05:03

## 2017-03-21 RX ADMIN — VANCOMYCIN HYDROCHLORIDE 1000 MG: 1 INJECTION, POWDER, LYOPHILIZED, FOR SOLUTION INTRAVENOUS at 09:03

## 2017-03-21 RX ADMIN — OXYCODONE AND ACETAMINOPHEN 1 TABLET: 10; 325 TABLET ORAL at 11:03

## 2017-03-21 RX ADMIN — MICONAZOLE NITRATE: 20 CREAM TOPICAL at 11:03

## 2017-03-21 RX ADMIN — ASPIRIN 325 MG ORAL TABLET 325 MG: 325 PILL ORAL at 09:03

## 2017-03-21 RX ADMIN — Medication 10 ML: at 05:03

## 2017-03-21 RX ADMIN — Medication 3 ML: at 05:03

## 2017-03-21 RX ADMIN — TRIAMCINOLONE ACETONIDE: 1 CREAM TOPICAL at 09:03

## 2017-03-21 RX ADMIN — OXYCODONE AND ACETAMINOPHEN 1 TABLET: 10; 325 TABLET ORAL at 12:03

## 2017-03-21 RX ADMIN — CETIRIZINE HYDROCHLORIDE 10 MG: 10 TABLET, FILM COATED ORAL at 09:03

## 2017-03-21 RX ADMIN — Medication 10 ML: at 11:03

## 2017-03-21 RX ADMIN — GABAPENTIN 600 MG: 300 CAPSULE ORAL at 01:03

## 2017-03-21 RX ADMIN — TAMSULOSIN HYDROCHLORIDE 0.4 MG: 0.4 CAPSULE ORAL at 09:03

## 2017-03-21 RX ADMIN — SODIUM CHLORIDE 1000 ML: 0.9 INJECTION, SOLUTION INTRAVENOUS at 01:03

## 2017-03-21 RX ADMIN — GABAPENTIN 600 MG: 300 CAPSULE ORAL at 11:03

## 2017-03-21 RX ADMIN — VENLAFAXINE 150 MG: 37.5 TABLET ORAL at 09:03

## 2017-03-21 RX ADMIN — PANTOPRAZOLE SODIUM 40 MG: 40 TABLET, DELAYED RELEASE ORAL at 09:03

## 2017-03-21 RX ADMIN — VENLAFAXINE 150 MG: 37.5 TABLET ORAL at 11:03

## 2017-03-21 RX ADMIN — GABAPENTIN 600 MG: 300 CAPSULE ORAL at 09:03

## 2017-03-21 RX ADMIN — Medication 3 ML: at 11:03

## 2017-03-21 RX ADMIN — Medication 10 ML: at 06:03

## 2017-03-21 RX ADMIN — TRIAMCINOLONE ACETONIDE: 1 CREAM TOPICAL at 11:03

## 2017-03-21 RX ADMIN — MICONAZOLE NITRATE: 20 CREAM TOPICAL at 09:03

## 2017-03-21 NOTE — PROGRESS NOTES
If MD or resident come to see Pt please call pt wife so she can have an understanding of whats going on.

## 2017-03-21 NOTE — PLAN OF CARE
Problem: Fall Risk (Adult)  Goal: Identify Related Risk Factors and Signs and Symptoms  Related risk factors and signs and symptoms are identified upon initiation of Human Response Clinical Practice Guideline (CPG)   Outcome: Ongoing (interventions implemented as appropriate)  Pt lying in bed AAOx4. No sign of distress noted. All item in reach
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
## 2017-03-21 NOTE — PROGRESS NOTES
Ortho consult returned page, stated he believe  maybe gone for the day but will inform resident that pt family wants to speak with them regarding Pt scheduled appointment for today.
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
## 2017-03-21 NOTE — PLAN OF CARE
Referral sent to [facility]  and [facility] for possibly discharge for tomorrow. Pt's wife Bri Montgomery confirmed today that this d/c plan will meet their financial obligation, since first 20 SNF days are used and copayment are now required to skilled.
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
## 2017-03-21 NOTE — PLAN OF CARE
Problem: Patient Care Overview  Goal: Plan of Care Review  Outcome: Ongoing (interventions implemented as appropriate)  Safety:  call light in reach, patient oriented to room & instructed how to notify nurse if assistance is needed, current questions/concerns addressed, bed in lowest position with wheels locked & side rails up X 2, fall precautions followed, patient free from fall/injury thus far this shift.  VTE/bleeding precautions maintained.  Activity:  patient turned with assistance, weight shifted at least every other hour.  Neurological:  A&OX4, follows commands, equal  strength & dorsi/plantarflexion, neuro checks performed as ordered.  Respiratory:  Tolerates room air without distress.  Cardiac:  BP stable.  HR stable.  Afebrile this shift.  Intake/Output:  Pt. tolerates PO intake well, denies nausea.  LBM 3/19/2017.  Pt. voids spontaneously and without difficulty clear yellow urine without foul odor and with adequate output for shift.  Pain:  Patient received prn pain medication as ordered.  Skin:  Pin sites to LLE CDI, patient refused cleaning/wound care stating it was done once during day and he only wanted it done in mornings, administered ointments to skin as ordered.  Devices:  PICC to L arm CDI, negative for s/sx of infection/infiltration; red lumen became sluggish, administered alteplase as ordered and assessed some improvement with flushing after instillation, blood return present in both lumens.

## 2017-03-21 NOTE — SUBJECTIVE & OBJECTIVE
Interval History: BALDO DOWNING.  Denies fever/chills/pain.  Cr up to 1.8 from 1.4.  Likely effect of the elevated vancomycin troughs. Should improve with decreased vanc dosing.  Will do urine studies.  Will also give fluids.  Patient and wife are agreeable to home health, expecting discharge tomorrow.    Review of Systems   Constitutional: Negative for chills and fever.   Respiratory: Negative for cough, shortness of breath and wheezing.    Cardiovascular: Negative for chest pain, palpitations and leg swelling.   Gastrointestinal: Negative for abdominal pain, constipation, diarrhea, nausea and vomiting.   Genitourinary: Positive for discharge. Negative for difficulty urinating and dysuria.     Objective:     Vital Signs (Most Recent):  Temp: 97.6 °F (36.4 °C) (03/21/17 0800)  Pulse: 74 (03/21/17 0900)  Resp: 20 (03/21/17 0800)  BP: (!) 142/63 (03/21/17 0800)  SpO2: 95 % (03/21/17 0800) Vital Signs (24h Range):  Temp:  [97.6 °F (36.4 °C)-98.7 °F (37.1 °C)] 97.6 °F (36.4 °C)  Pulse:  [65-80] 74  Resp:  [16-20] 20  SpO2:  [91 %-96 %] 95 %  BP: (117-143)/(53-79) 142/63     Weight: (!) 149.7 kg (330 lb)  Body mass index is 43.54 kg/(m^2).    Intake/Output Summary (Last 24 hours) at 03/21/17 0917  Last data filed at 03/21/17 0502   Gross per 24 hour   Intake             1100 ml   Output             1650 ml   Net             -550 ml      Physical Exam   Constitutional: He appears well-developed and well-nourished. No distress.   Neck: Normal range of motion. Neck supple.   Cardiovascular: Normal rate, regular rhythm, normal heart sounds and intact distal pulses.  Exam reveals no gallop and no friction rub.    No murmur heard.  Pulmonary/Chest: Effort normal and breath sounds normal. No respiratory distress. He has no wheezes. He has no rales.   Abdominal: Soft. Bowel sounds are normal. He exhibits no distension. There is no tenderness.   Musculoskeletal:   LLE ex fix. No ooze or pus from pins   Lymphadenopathy:     He has no  cervical adenopathy.   Skin: Rash (improving) noted.       Significant Labs:   CBC:     Recent Labs  Lab 03/20/17  0449 03/21/17  0431   WBC 8.71 9.75   HGB 9.8* 10.1*   HCT 28.5* 30.9*    209     CMP:     Recent Labs  Lab 03/20/17 0449 03/21/17  0431    138   K 4.1 4.5    104   CO2 24 26    94   BUN 21 23   CREATININE 1.4 1.8*   CALCIUM 8.3* 8.8   PROT 5.9* 6.2   ALBUMIN 2.4* 2.5*   BILITOT 0.5 0.5   ALKPHOS 135 141*   AST 68* 65*   ALT 91* 93*   ANIONGAP 11 8   EGFRNONAA 50.5* 37.3*       Significant Imaging: No recent imaging
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
## 2017-03-21 NOTE — ASSESSMENT & PLAN NOTE
- continue ASA and statin  - beta blocker and ACEI were held due to hypotension in setting of sepsis  - continue to hold given currently good HR and BP
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
## 2017-03-21 NOTE — SUBJECTIVE & OBJECTIVE
Interval History:  No acute events overnight.  Afebrile.   Denies complaints.  Plan for possibly home tomorrow with HH, though with recurrent NAT today.  Vancomycin trough mildly elevated, likely due to NAT.     Review of Systems   Constitutional: Negative for chills and fever.   Respiratory: Negative for cough and shortness of breath.    Cardiovascular: Negative for chest pain.   Gastrointestinal: Negative for abdominal pain, diarrhea, nausea and vomiting.   Genitourinary: Negative for dysuria.   Musculoskeletal: Negative for back pain.        Left lower leg pain   Skin: Positive for rash (resolving.  No pruritis) and wound.   Neurological: Negative for numbness and headaches.     Objective:     Vital Signs (Most Recent):  Temp: 97.6 °F (36.4 °C) (03/21/17 0800)  Pulse: 74 (03/21/17 0900)  Resp: 20 (03/21/17 0800)  BP: (!) 142/63 (03/21/17 0800)  SpO2: 95 % (03/21/17 0800) Vital Signs (24h Range):  Temp:  [97.6 °F (36.4 °C)-98.7 °F (37.1 °C)] 97.6 °F (36.4 °C)  Pulse:  [65-80] 74  Resp:  [16-20] 20  SpO2:  [91 %-96 %] 95 %  BP: (117-143)/(53-79) 142/63     Weight: (!) 149.7 kg (330 lb)  Body mass index is 43.54 kg/(m^2).    Estimated Creatinine Clearance: 58.2 mL/min (based on Cr of 1.8).    Physical Exam   Constitutional: He is oriented to person, place, and time. He appears well-developed and well-nourished. No distress.   HENT:   Head: Normocephalic and atraumatic.   Mouth/Throat: Oropharynx is clear and moist. No oropharyngeal exudate.   Eyes: EOM are normal. No scleral icterus.   Neck: Normal range of motion.   Cardiovascular: Normal rate, regular rhythm and normal heart sounds.    Pulmonary/Chest: Effort normal and breath sounds normal. No respiratory distress.   Abdominal: Soft. Bowel sounds are normal. He exhibits no distension. There is no tenderness.   Musculoskeletal: He exhibits edema (left lower leg - improved).   No significant erythema or warmth of left lower leg.  Mild edema.  Overall remains  much improved.   Patch over dorsum of foot much improved.  External fixator in place.  No active drainage noted from pins.     Left heel ulcer clean, no active drainage/purulence, no malodor.     Neurological: He is alert and oriented to person, place, and time.   Skin: Skin is warm and dry. Rash noted. He is not diaphoretic.   Rash over face, trunk, bilateral extremities resolving.     PICC LUE - c/d/i   Psychiatric: He has a normal mood and affect. His behavior is normal.       Significant Labs:   Blood Culture:   Recent Labs  Lab 02/20/17  1150 02/24/17  1324 02/24/17  1334 03/14/17  2341 03/15/17  0045   LABBLOO No growth after 5 days. No growth after 5 days. No growth after 5 days. No growth after 5 days. No growth after 5 days.     CBC:   Recent Labs  Lab 03/20/17  0449 03/21/17  0431   WBC 8.71 9.75   HGB 9.8* 10.1*   HCT 28.5* 30.9*    209     CMP:   Recent Labs  Lab 03/20/17  0449 03/21/17  0431    138   K 4.1 4.5    104   CO2 24 26    94   BUN 21 23   CREATININE 1.4 1.8*   CALCIUM 8.3* 8.8   PROT 5.9* 6.2   ALBUMIN 2.4* 2.5*   BILITOT 0.5 0.5   ALKPHOS 135 141*   AST 68* 65*   ALT 91* 93*   ANIONGAP 11 8   EGFRNONAA 50.5* 37.3*       Significant Imaging: None

## 2017-03-21 NOTE — ASSESSMENT & PLAN NOTE
- Cr 1.0 on admission. Increased to 1.6 after pt had drug reaction event  - Negative mark stain  -Cr stable at 1.8 elevated from 1.5, should improve with decreased vanc doses. Will check UA, Mark's stain, FENa.  Give fluids.  - Renally dose medications  - Avoid nephrotoxic agents if possible  - Monitor I/O

## 2017-03-22 ENCOUNTER — TELEPHONE (OUTPATIENT)
Dept: ORTHOPEDICS | Facility: CLINIC | Age: 72
End: 2017-03-22

## 2017-03-22 LAB
ALBUMIN SERPL BCP-MCNC: 2.3 G/DL
ALP SERPL-CCNC: 137 U/L
ALT SERPL W/O P-5'-P-CCNC: 90 U/L
ANION GAP SERPL CALC-SCNC: 9 MMOL/L
AST SERPL-CCNC: 56 U/L
BASOPHILS # BLD AUTO: 0.04 K/UL
BASOPHILS NFR BLD: 0.4 %
BILIRUB SERPL-MCNC: 0.4 MG/DL
BUN SERPL-MCNC: 20 MG/DL
CALCIUM SERPL-MCNC: 8.4 MG/DL
CHLORIDE SERPL-SCNC: 107 MMOL/L
CO2 SERPL-SCNC: 24 MMOL/L
CREAT SERPL-MCNC: 1.7 MG/DL
DIFFERENTIAL METHOD: ABNORMAL
EOSINOPHIL # BLD AUTO: 1.1 K/UL
EOSINOPHIL NFR BLD: 12.1 %
ERYTHROCYTE [DISTWIDTH] IN BLOOD BY AUTOMATED COUNT: 13.5 %
EST. GFR  (AFRICAN AMERICAN): 46.2 ML/MIN/1.73 M^2
EST. GFR  (NON AFRICAN AMERICAN): 40 ML/MIN/1.73 M^2
GLUCOSE SERPL-MCNC: 102 MG/DL
HCT VFR BLD AUTO: 28.5 %
HGB BLD-MCNC: 9.8 G/DL
HHV6 IGG TITR SER IF: ABNORMAL TITER
HHV6 IGM TITR SER IF: ABNORMAL TITER
LYMPHOCYTES # BLD AUTO: 2.3 K/UL
LYMPHOCYTES NFR BLD: 24.5 %
MAGNESIUM SERPL-MCNC: 1.8 MG/DL
MCH RBC QN AUTO: 31.6 PG
MCHC RBC AUTO-ENTMCNC: 34.4 %
MCV RBC AUTO: 92 FL
MONOCYTES # BLD AUTO: 0.6 K/UL
MONOCYTES NFR BLD: 5.9 %
NEUTROPHILS # BLD AUTO: 5.4 K/UL
NEUTROPHILS NFR BLD: 56.8 %
PLATELET # BLD AUTO: 214 K/UL
PMV BLD AUTO: 9.2 FL
POTASSIUM SERPL-SCNC: 4.3 MMOL/L
PROT SERPL-MCNC: 5.9 G/DL
RBC # BLD AUTO: 3.1 M/UL
SODIUM SERPL-SCNC: 140 MMOL/L
WBC # BLD AUTO: 9.44 K/UL

## 2017-03-22 PROCEDURE — 25000003 PHARM REV CODE 250: Performed by: HOSPITALIST

## 2017-03-22 PROCEDURE — 25000003 PHARM REV CODE 250: Performed by: STUDENT IN AN ORGANIZED HEALTH CARE EDUCATION/TRAINING PROGRAM

## 2017-03-22 PROCEDURE — 11000001 HC ACUTE MED/SURG PRIVATE ROOM

## 2017-03-22 PROCEDURE — 97110 THERAPEUTIC EXERCISES: CPT

## 2017-03-22 PROCEDURE — 83735 ASSAY OF MAGNESIUM: CPT

## 2017-03-22 PROCEDURE — 25000003 PHARM REV CODE 250: Performed by: INTERNAL MEDICINE

## 2017-03-22 PROCEDURE — 85025 COMPLETE CBC W/AUTO DIFF WBC: CPT

## 2017-03-22 PROCEDURE — 97530 THERAPEUTIC ACTIVITIES: CPT

## 2017-03-22 PROCEDURE — 25000003 PHARM REV CODE 250

## 2017-03-22 PROCEDURE — 99233 SBSQ HOSP IP/OBS HIGH 50: CPT | Mod: GC,,, | Performed by: HOSPITALIST

## 2017-03-22 PROCEDURE — 63600175 PHARM REV CODE 636 W HCPCS: Performed by: STUDENT IN AN ORGANIZED HEALTH CARE EDUCATION/TRAINING PROGRAM

## 2017-03-22 PROCEDURE — 80053 COMPREHEN METABOLIC PANEL: CPT

## 2017-03-22 RX ORDER — AMOXICILLIN 250 MG
1 CAPSULE ORAL DAILY
Status: DISCONTINUED | OUTPATIENT
Start: 2017-03-22 | End: 2017-03-23 | Stop reason: HOSPADM

## 2017-03-22 RX ADMIN — GABAPENTIN 600 MG: 300 CAPSULE ORAL at 08:03

## 2017-03-22 RX ADMIN — Medication 10 ML: at 12:03

## 2017-03-22 RX ADMIN — VANCOMYCIN HYDROCHLORIDE 1500 MG: 1 INJECTION, POWDER, LYOPHILIZED, FOR SOLUTION INTRAVENOUS at 01:03

## 2017-03-22 RX ADMIN — PANTOPRAZOLE SODIUM 40 MG: 40 TABLET, DELAYED RELEASE ORAL at 09:03

## 2017-03-22 RX ADMIN — CETIRIZINE HYDROCHLORIDE 10 MG: 10 TABLET, FILM COATED ORAL at 09:03

## 2017-03-22 RX ADMIN — VENLAFAXINE 150 MG: 37.5 TABLET ORAL at 08:03

## 2017-03-22 RX ADMIN — STANDARDIZED SENNA CONCENTRATE AND DOCUSATE SODIUM 1 TABLET: 8.6; 5 TABLET, FILM COATED ORAL at 09:03

## 2017-03-22 RX ADMIN — SODIUM CHLORIDE 1000 ML: 0.9 INJECTION, SOLUTION INTRAVENOUS at 02:03

## 2017-03-22 RX ADMIN — GABAPENTIN 600 MG: 300 CAPSULE ORAL at 09:03

## 2017-03-22 RX ADMIN — POLYETHYLENE GLYCOL 3350 17 G: 17 POWDER, FOR SOLUTION ORAL at 09:03

## 2017-03-22 RX ADMIN — VENLAFAXINE 150 MG: 37.5 TABLET ORAL at 09:03

## 2017-03-22 RX ADMIN — TAMSULOSIN HYDROCHLORIDE 0.4 MG: 0.4 CAPSULE ORAL at 09:03

## 2017-03-22 RX ADMIN — GABAPENTIN 600 MG: 300 CAPSULE ORAL at 02:03

## 2017-03-22 RX ADMIN — MICONAZOLE NITRATE: 20 CREAM TOPICAL at 09:03

## 2017-03-22 RX ADMIN — ASPIRIN 325 MG ORAL TABLET 325 MG: 325 PILL ORAL at 09:03

## 2017-03-22 RX ADMIN — MICONAZOLE NITRATE: 20 CREAM TOPICAL at 08:03

## 2017-03-22 RX ADMIN — TRIAMCINOLONE ACETONIDE: 1 CREAM TOPICAL at 09:03

## 2017-03-22 RX ADMIN — Medication 3 ML: at 09:03

## 2017-03-22 RX ADMIN — Medication 3 ML: at 02:03

## 2017-03-22 RX ADMIN — ATORVASTATIN CALCIUM 40 MG: 20 TABLET, FILM COATED ORAL at 09:03

## 2017-03-22 NOTE — SUBJECTIVE & OBJECTIVE
Interval History: NAEON, VSS, no complaints.  Patient has NAT, which has multiple possible precipitants: hypotension at initial admit, patient's eosinophilic reaction, patient's antibiotics and protonix, as well as patient's lower extremity infection. Patient is pending placement with either home health or SNF.  Patient is out of SNF days and would have to pay out of pocket, there is uncertainty on whether home health would be able to assist with the IV vancomycin on a daily basis, and patient's wife is unable to administer the IV medication.  Social workers currently on the case.    Review of Systems   Constitutional: Negative for chills and fever.   Respiratory: Negative for cough, shortness of breath and wheezing.    Cardiovascular: Negative for chest pain, palpitations and leg swelling.   Gastrointestinal: Negative for abdominal pain, constipation, diarrhea, nausea and vomiting.   Genitourinary: Positive for discharge. Negative for difficulty urinating and dysuria.     Objective:     Vital Signs (Most Recent):  Temp: 97.7 °F (36.5 °C) (03/22/17 0805)  Pulse: 65 (03/22/17 0805)  Resp: 16 (03/22/17 0805)  BP: (!) 153/67 (03/22/17 0805)  SpO2: 97 % (03/22/17 0805) Vital Signs (24h Range):  Temp:  [97.4 °F (36.3 °C)-97.9 °F (36.6 °C)] 97.7 °F (36.5 °C)  Pulse:  [64-87] 65  Resp:  [16-21] 16  SpO2:  [94 %-98 %] 97 %  BP: (121-153)/(56-87) 153/67     Weight: (!) 149.5 kg (329 lb 10.8 oz)  Body mass index is 43.5 kg/(m^2).    Intake/Output Summary (Last 24 hours) at 03/22/17 1137  Last data filed at 03/22/17 0949   Gross per 24 hour   Intake              940 ml   Output             4375 ml   Net            -3435 ml      Physical Exam   Constitutional: He appears well-developed and well-nourished. No distress.   Neck: Normal range of motion. Neck supple.   Cardiovascular: Normal rate, regular rhythm, normal heart sounds and intact distal pulses.  Exam reveals no gallop and no friction rub.    No murmur  heard.  Pulmonary/Chest: Effort normal and breath sounds normal. No respiratory distress. He has no wheezes. He has no rales.   Abdominal: Soft. Bowel sounds are normal. He exhibits no distension. There is no tenderness.   Musculoskeletal:   LLE ex fix. No ooze or pus from pins   Lymphadenopathy:     He has no cervical adenopathy.   Skin: Rash (improving) noted.       Significant Labs:   CBC:   Recent Labs  Lab 03/21/17  0431 03/22/17  0400   WBC 9.75 9.44   HGB 10.1* 9.8*   HCT 30.9* 28.5*    214     CMP:   Recent Labs  Lab 03/21/17  0431 03/22/17  0400    140   K 4.5 4.3    107   CO2 26 24   GLU 94 102   BUN 23 20   CREATININE 1.8* 1.7*   CALCIUM 8.8 8.4*   PROT 6.2 5.9*   ALBUMIN 2.5* 2.3*   BILITOT 0.5 0.4   ALKPHOS 141* 137*   AST 65* 56*   ALT 93* 90*   ANIONGAP 8 9   EGFRNONAA 37.3* 40.0*       Significant Imaging: I have reviewed all pertinent imaging results/findings within the past 24 hours.

## 2017-03-22 NOTE — PLAN OF CARE
Ashli with [facility]  working with team on d/c planning and reports providing pt and his wife face to face encounters to work on a safe plan to go home with home health and infusion services with cal. Ashli reports that [facility] have not received clearance from [facility] on service coverage and may possibly receive tomorrow.     SW will continue to follow. Pt may possibly discharge tomorrow

## 2017-03-22 NOTE — PT/OT/SLP PROGRESS
Physical Therapy  Treatment    Janusz Montgomery Jr.   MRN: [id number]   Admitting Diagnosis: Sepsis    PT Received On: 17  PT Start Time: 09     PT Stop Time: 1010    PT Total Time (min): 31 min       Billable Minutes:  Therapeutic Activity 21 and Therapeutic Exercise 10    Treatment Type: Treatment  PT/PTA: PT             General Precautions: Standard, fall  Orthopedic Precautions: LLE non weight bearing   Braces:  (L LE ex fix)         Subjective:  Communicated with RN prior to session.  Pt agreeable to PT session. Family present at bedside throughout.     Pt reports pain/discomfort in L LE when moving toes or keep extremity lowered for a period of time. Relief noted when elevated.   Pain Ratin/10   Pain Rating Post-Intervention: 0/10    Objective:   Patient found with: PICC line, telemetry (L LE ex Fix)    Functional Mobility:    Bed Mobility:  Supine to Sit:  Supervision or Set-up Assistance   Sit to supine: Supervision or Set-up Assistance HOB flat      Transfers:   Sit to stand:Contact Guard Assistance with Rolling Walker   CGA for safety & balance upon standing. Performed x3 throughout session for functional strengthening. V/c for safe technique.       Gait:   Did not perform this visit 2/2 pt reported pain/discomfort with L LE in dependent position.       Balance:   Static Sit: GOOD: Takes MODERATE challenges from all directions  Dynamic Sit: GOOD: Maintains balance through MODERATE excursions of active trunk movement  Static Stand: FAIR: Maintains without assist but unable to take challenges  Dynamic stand: FAIR: Needs CONTACT GUARD during gait     Therapeutic Activities and Exercises:  Performed RW dips while standing on R LE x8 reps as pt tolerated. Pt maintained NWB status throughout.   Performed sit to stand from EOB x3 reps with CGA only for safety for functional strengthening.   Performed single-leg bridging with L LE crossed across R LE. Performed x6 reps.     PT provided pt HEP of quad sets,  glute sets, w/c push-ups, tricep extension with theraband, and bicep curls with theraband. PT reviewed with pt and educated family.     Education:  Education provided to pt and family regarding: HEP; safety with mobility. Verbalized understanding.     Whiteboard updated with correct mobility information. RN/PCT notified.  Pt ok to transfer via stand pivot with RN/PCT. Use RW & 1 person assist.      AM-PAC 6 CLICK MOBILITY  How much help from another person does this patient currently need?   1 = Unable, Total/Dependent Assistance  2 = A lot, Maximum/Moderate Assistance  3 = A little, Minimum/Contact Guard/Supervision  4 = None, Modified Bryan/Independent    Turning over in bed (including adjusting bedclothes, sheets and blankets)?: 4  Sitting down on and standing up from a chair with arms (e.g., wheelchair, bedside commode, etc.): 4  Moving from lying on back to sitting on the side of the bed?: 4  Moving to and from a bed to a chair (including a wheelchair)?: 3  Need to walk in hospital room?: 3  Climbing 3-5 steps with a railing?: 1  Total Score: 19    AM-PAC Raw Score CMS G-Code Modifier Level of Impairment Assistance   6 % Total / Unable   7 - 9 CM 80 - 100% Maximal Assist   10 - 14 CL 60 - 80% Moderate Assist   15 - 19 CK 40 - 60% Moderate Assist   20 - 22 CJ 20 - 40% Minimal Assist   23 CI 1-20% SBA / CGA   24 CH 0% Independent/ Mod I     Patient left supine with all lines intact and call button in reach.    Assessment:  Janusz Montgomery Jr. is a 72 y.o. male with a medical diagnosis of Sepsis and presents with decrease endurance, strength, balance, and coordination with overall mobility. Pt limited by L LE pain and NWB status.  Pt tolerated session well and continues to progress towards goals.  Pt demonstrated good knowledge and motivation for further strengthening. Pt would benefit from continued skilled physical therapy to address listed impairments, improve functional independence, and  maximize safety with mobility.  PT recommends dc disposition to Home with HHPT for continued strengthening. Pt may benefit from OP PT for higher strengthening and mobility, but at this time requires IV abx.  .    Rehab identified problem list/impairments: Rehab identified problem list/impairments: weakness, impaired endurance, impaired functional mobilty, gait instability, impaired balance, decreased coordination, decreased lower extremity function, orthopedic precautions, pain    Rehab potential is good.    Activity tolerance: Good    Discharge recommendations: Discharge Facility/Level Of Care Needs: home health PT (possible OP PT pending transportation?)     Barriers to discharge: Barriers to Discharge: None    Equipment recommendations: Equipment Needed After Discharge: none     GOALS:   Physical Therapy Goals        Problem: Physical Therapy Goal    Goal Priority Disciplines Outcome Goal Variances Interventions   Physical Therapy Goal     PT/OT, PT Ongoing (interventions implemented as appropriate)     Description:  Goals to be met by: 2017     Patient will increase functional independence with mobility by performin. Supine to sit with supervision. MET 3/22  2. Sit to supine with supervision. MET 3/22  3. Sit to stand transfer with Supervision  4. Bed to chair transfer with Stand-by Assistance   5. Gait  x 10 feet with Stand-by Assistance using Rolling Walker.   6. Wheelchair propulsion x100 feet with Supervision using bilateral uppper extremities  7. Pt will perform exercises x10-15 reps as tolerated for core and LE strengthening with Supervision.                    PLAN:    Patient to be seen 4 x/week  to address the above listed problems via gait training, therapeutic activities, therapeutic exercises  Plan of Care expires: 17  Plan of Care reviewed with: patient, spouse, daughter         Bhavana Terry, PT  2017

## 2017-03-22 NOTE — PLAN OF CARE
Problem: Physical Therapy Goal  Goal: Physical Therapy Goal  Goals to be met by: 2017     Patient will increase functional independence with mobility by performin. Supine to sit with supervision. MET 3/22  2. Sit to supine with supervision. MET 3/22  3. Sit to stand transfer with Supervision  4. Bed to chair transfer with Stand-by Assistance   5. Gait x 10 feet with Stand-by Assistance using Rolling Walker.   6. Wheelchair propulsion x100 feet with Supervision using bilateral uppper extremities  7. Pt will perform exercises x10-15 reps as tolerated for core and LE strengthening with Supervision.                  2 goals met. Goals updated. Pt progressing towards goals. All goals remain appropriate at this time.     Bhavana Terry, PT, DPT  3/22/2017
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
## 2017-03-22 NOTE — PROGRESS NOTES
Leg looks very good.  Swelling down.  5th MT pin loose.  I removed the pin on the floor.  Will keep remaining construct on for now.  He has PICC line and is going home today.  HH can inject for up to a week.  They are also going to teach the wife how to do this at home, and the family is looking into an infusion center near their home for the weekdays.  I will schedule him to come to our clinic for a check next week.      Walter Cortés MD

## 2017-03-22 NOTE — PLAN OF CARE
Problem: Patient Care Overview  Goal: Plan of Care Review  Outcome: Ongoing (interventions implemented as appropriate)  Pt AAOx4, VSS, afebrile. C/o pain to lower extremity with relief to PRN PO pain medications.  Pt left for ultrasound during shift.  Urine output 1100cc clear yellow so far during shift.  1 dose Vancomycin administered.  Fall risk precautions initiated.  Pt in lowest bed position setting, lighting adjusted, pt to wear nonskid socks when ambulating, side rails up x2.  Pt remain free from falls during shift.  Pt verbalize understanding to call when needed assistance.  Call light within reach.  Will continue to monitor.
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
## 2017-03-22 NOTE — ASSESSMENT & PLAN NOTE
- Cr 1.0 on admission. Increased to 1.6 after pt had drug reaction event  - Negative mark stain  -Cr elevated from 1.5, stable at 1.7.  Likely multifactorial intrinsic kidney injury: patient's eosinophilic reaction, hypotension on admit, lower extremity infection, medications such as antibiotics and protonix.  - IVF  - discuss with ID about possible alternative antibiotics that may be increase Cr less.  - Renally dose medications  - Avoid nephrotoxic agents if possible  - Monitor I/O

## 2017-03-22 NOTE — TELEPHONE ENCOUNTER
----- Message from Walter Cortés MD sent at 3/22/2017  3:19 PM CDT -----  Ulises Janusz ALYSIA Jr.  Male, 72 y.o., 1945    Kezia,    Can you get Mr. Montgomery an appt for later next week for a post admission skin check please?    Thanks,  Gustavo

## 2017-03-23 VITALS
HEIGHT: 73 IN | BODY MASS INDEX: 41.75 KG/M2 | WEIGHT: 315 LBS | DIASTOLIC BLOOD PRESSURE: 79 MMHG | OXYGEN SATURATION: 94 % | SYSTOLIC BLOOD PRESSURE: 136 MMHG | TEMPERATURE: 98 F | HEART RATE: 69 BPM | RESPIRATION RATE: 18 BRPM

## 2017-03-23 LAB
ALBUMIN SERPL BCP-MCNC: 2.5 G/DL
ALP SERPL-CCNC: 142 U/L
ALT SERPL W/O P-5'-P-CCNC: 87 U/L
ANION GAP SERPL CALC-SCNC: 6 MMOL/L
AST SERPL-CCNC: 49 U/L
BASOPHILS # BLD AUTO: 0.05 K/UL
BASOPHILS NFR BLD: 0.5 %
BILIRUB SERPL-MCNC: 0.5 MG/DL
BUN SERPL-MCNC: 18 MG/DL
CALCIUM SERPL-MCNC: 9 MG/DL
CHLORIDE SERPL-SCNC: 105 MMOL/L
CO2 SERPL-SCNC: 28 MMOL/L
CREAT SERPL-MCNC: 1.7 MG/DL
DIFFERENTIAL METHOD: ABNORMAL
EOSINOPHIL # BLD AUTO: 1 K/UL
EOSINOPHIL NFR BLD: 9.4 %
ERYTHROCYTE [DISTWIDTH] IN BLOOD BY AUTOMATED COUNT: 13.4 %
EST. GFR  (AFRICAN AMERICAN): 46.2 ML/MIN/1.73 M^2
EST. GFR  (NON AFRICAN AMERICAN): 40 ML/MIN/1.73 M^2
GLUCOSE SERPL-MCNC: 111 MG/DL
HCT VFR BLD AUTO: 30.8 %
HGB BLD-MCNC: 10.3 G/DL
LYMPHOCYTES # BLD AUTO: 2.2 K/UL
LYMPHOCYTES NFR BLD: 21.8 %
MAGNESIUM SERPL-MCNC: 1.9 MG/DL
MCH RBC QN AUTO: 31.2 PG
MCHC RBC AUTO-ENTMCNC: 33.4 %
MCV RBC AUTO: 93 FL
MONOCYTES # BLD AUTO: 0.5 K/UL
MONOCYTES NFR BLD: 4.4 %
NEUTROPHILS # BLD AUTO: 6.5 K/UL
NEUTROPHILS NFR BLD: 63.7 %
PLATELET # BLD AUTO: 240 K/UL
PMV BLD AUTO: 9.4 FL
POTASSIUM SERPL-SCNC: 3.9 MMOL/L
PROT SERPL-MCNC: 6.2 G/DL
RBC # BLD AUTO: 3.3 M/UL
SODIUM SERPL-SCNC: 139 MMOL/L
VANCOMYCIN SERPL-MCNC: 22.7 UG/ML
WBC # BLD AUTO: 10.25 K/UL

## 2017-03-23 PROCEDURE — 25000003 PHARM REV CODE 250: Performed by: STUDENT IN AN ORGANIZED HEALTH CARE EDUCATION/TRAINING PROGRAM

## 2017-03-23 PROCEDURE — 25000003 PHARM REV CODE 250: Performed by: INTERNAL MEDICINE

## 2017-03-23 PROCEDURE — 25000003 PHARM REV CODE 250

## 2017-03-23 PROCEDURE — 80053 COMPREHEN METABOLIC PANEL: CPT

## 2017-03-23 PROCEDURE — 97803 MED NUTRITION INDIV SUBSEQ: CPT

## 2017-03-23 PROCEDURE — 85025 COMPLETE CBC W/AUTO DIFF WBC: CPT

## 2017-03-23 PROCEDURE — 83735 ASSAY OF MAGNESIUM: CPT

## 2017-03-23 PROCEDURE — 99239 HOSP IP/OBS DSCHRG MGMT >30: CPT | Mod: GC,,, | Performed by: HOSPITALIST

## 2017-03-23 PROCEDURE — 80202 ASSAY OF VANCOMYCIN: CPT

## 2017-03-23 PROCEDURE — 25000003 PHARM REV CODE 250: Performed by: HOSPITALIST

## 2017-03-23 PROCEDURE — 63600175 PHARM REV CODE 636 W HCPCS: Performed by: STUDENT IN AN ORGANIZED HEALTH CARE EDUCATION/TRAINING PROGRAM

## 2017-03-23 RX ADMIN — Medication 10 ML: at 11:03

## 2017-03-23 RX ADMIN — Medication 3 ML: at 03:03

## 2017-03-23 RX ADMIN — GABAPENTIN 600 MG: 300 CAPSULE ORAL at 09:03

## 2017-03-23 RX ADMIN — ASPIRIN 325 MG ORAL TABLET 325 MG: 325 PILL ORAL at 09:03

## 2017-03-23 RX ADMIN — OXYCODONE AND ACETAMINOPHEN 1 TABLET: 10; 325 TABLET ORAL at 04:03

## 2017-03-23 RX ADMIN — CETIRIZINE HYDROCHLORIDE 10 MG: 10 TABLET, FILM COATED ORAL at 09:03

## 2017-03-23 RX ADMIN — MICONAZOLE NITRATE: 20 CREAM TOPICAL at 09:03

## 2017-03-23 RX ADMIN — GABAPENTIN 600 MG: 300 CAPSULE ORAL at 03:03

## 2017-03-23 RX ADMIN — POLYETHYLENE GLYCOL 3350 17 G: 17 POWDER, FOR SOLUTION ORAL at 09:03

## 2017-03-23 RX ADMIN — TRIAMCINOLONE ACETONIDE: 1 CREAM TOPICAL at 09:03

## 2017-03-23 RX ADMIN — ATORVASTATIN CALCIUM 40 MG: 20 TABLET, FILM COATED ORAL at 09:03

## 2017-03-23 RX ADMIN — STANDARDIZED SENNA CONCENTRATE AND DOCUSATE SODIUM 1 TABLET: 8.6; 5 TABLET, FILM COATED ORAL at 09:03

## 2017-03-23 RX ADMIN — Medication 10 ML: at 06:03

## 2017-03-23 RX ADMIN — VANCOMYCIN HYDROCHLORIDE 1500 MG: 1 INJECTION, POWDER, LYOPHILIZED, FOR SOLUTION INTRAVENOUS at 12:03

## 2017-03-23 RX ADMIN — TAMSULOSIN HYDROCHLORIDE 0.4 MG: 0.4 CAPSULE ORAL at 09:03

## 2017-03-23 RX ADMIN — VENLAFAXINE 150 MG: 37.5 TABLET ORAL at 09:03
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
## 2017-03-23 NOTE — PROGRESS NOTES
Wound care to pin sites on left foot tolerated well. Stated his heel is due tomorrow. Will continue to monitor. No drainage or odor at sites. Dressings applied per orders post cleaning.tolerated well. Assessment on going.

## 2017-03-23 NOTE — PLAN OF CARE
[facility] HEALTH ORDERS  FACE TO FACE ENCOUNTER    Patient Name: Janusz Montgomery Jr.  YOB: 1945    PCP: Miguelina Weathers MD   PCP Address: [address] / [place] [address]  PCP Phone Number: [phone]  PCP Fax: [phone]    Encounter Date: 03/23/2017    Admit to Home Health    Diagnoses:  Active Hospital Problems    Diagnosis  POA    NAT (acute kidney injury) [N17.9]  No    Cellulitis of left lower extremity [L03.116]  Yes    Drug allergy, anti-infective [Z88.3]  Not Applicable    Cellulitis of left upper extremity [L03.114]  Yes    Cellulitis [L03.90]  Yes    Non-pressure chronic ulcer of other part of left foot with unspecified severity [L97.529]  Yes    Morbid obesity with BMI of 40.0-44.9, adult [E66.01, Z68.41]  Not Applicable    Anxiety [F41.9]  Yes    GERD (gastroesophageal reflux disease) [K21.9]  Yes    Essential hypertension [I10]  Yes    Hyperlipidemia [E78.5]  Yes    Coronary artery disease involving native coronary artery of native heart without angina pectoris s/p RCA stent [I25.10]  Yes      Resolved Hospital Problems    Diagnosis Date Resolved POA    *Sepsis [A41.9] 03/21/2017 Yes       Future Appointments  Date Time Provider Department Center   3/28/2017 8:30 AM Walter Cortés MD [facility] [address]   3/28/2017 10:00 AM KAVITHA Ward [facility] [address] [facility]   3/28/2017 1:00 PM Benny Cannon PA-C [facility] ID [address]   4/3/2017 8:00 AM [facility]   4/28/2017 2:00 PM Miguelina Weathers MD [facility] [place]     Follow-up Information     Follow up with [facility].    Specialty:  Home Health Services    Why:  Home Health     Contact information:    [address]  [place] [id number] [phone]          Follow up with [facility].    Specialties:  Pharmacist, DME Provider, IV Infusion    Why:  Infusion     Contact information:    [address] [place]  [place] [address]  [phone]          Follow up with [facility] in 1 week after discharge.        Follow up with [facility] 3/28/2017.    Specialty:  Internal Medicine    Why:  @ 10:00    Contact information:    [address]  [place] [address]  [phone]              I have seen and examined this patient face to face today. My clinical findings that support the need for the home health skilled services and home bound status are the following:  Weakness/numbness causing balance and gait disturbance due to Weakness/Debility making it taxing to leave home.    Allergies:  Review of patient's allergies indicates:   Allergen Reactions    Ciprofloxacin Rash     Diffuse pruritic morbilliform rash developed 3/15/2017 after dose of cipro; previously in 2/2017 he had rash/fevers after initiation of cipro    Zosyn [piperacillin-tazobactam] Anaphylaxis     Diffuse pruritic morbilliform rash developed 3/15/2017.  Then, 430am dose on 3/16 and rash worsened with SOB/tachypnea but no hypoxemia.     Bacitracin Itching and Rash     Violaceous rash in area of topical Tx.        Diet: regular diet    Activities: activity as tolerated    Nursing:   SN to complete comprehensive assessment including routine vital signs. Instruct on disease process and s/s of complications to report to MD. Review/verify medication list sent home with the patient at time of discharge  and instruct patient/caregiver as needed. Frequency may be adjusted depending on start of care date.    Notify MD if SBP > 160 or < 90; DBP > 90 or < 50; HR > 120 or < 50; Temp > 101      CONSULTS:    Physical Therapy to evaluate and treat. Evaluate for home safety and equipment needs; Establish/upgrade home exercise program. Perform / instruct on therapeutic exercises, gait training, transfer training, and Range of Motion.  Occupational Therapy to evaluate and treat. Evaluate home environment for safety and equipment needs. Perform/Instruct on transfers,  ADL training, ROM, and therapeutic exercises.  Aide to provide assistance with personal care, ADLs, and vital signs.    MISCELLANEOUS CARE:  Home Infusion Therapy:   SN to perform Infusion Therapy/Central Line Care.  Review Central Line Care & Central Line Flush with patient.    Administer (drug and dose): dextrose 5 % SolP 250 mL with vancomycin 1,000 mg SolR 1,500 mg, administer over 90 minutes, every 24 hours.  Last dose given: Thursday, 3/23/2017 at 0047  Home dose due: Friday, 3/24/2017 at 0900  Stop date: 4/26/17    Scrub the Hub: Prior to accessing the line, always perform a 30 second alcohol scrub  Each lumen of the central line is to be flushed at least daily with 10 mL Normal Saline and 3 mL Heparin flush (100 units/mL)  Skilled Nurse (SN) may draw blood from IV access  Blood Draw Procedure:   - Aspirate at least 5 mL of blood   - Discard   - Obtain specimen   - Change posiflow cap   - Flush with 20 mL Normal Saline followed by a                 3-5 mL Heparin flush (100 units/mL)  Central :   - Sterile dressing changes are done weekly and as needed.   - Use chlor-hexadine scrub to cleanse site, apply Biopatch to insertion site,       apply securement device dressing   - Posi-flow caps are changed weekly and after EVERY lab draw.   - If sterile gauze is under dressing to control oozing,                 dressing change must be performed every 24 hours until gauze is not needed.    Wound Care Orders:  yes:  Surgical Wound:  Location: Left Foot  - Left Heel ulcer every MWF: (patient to bring patient's supplies from home for wound care) clean with normal saline, apply santyl to wound bed (nickel thick), apply kirsten on top, cover with dry gauze and secure with kerlix.    - PIN SITE CARE TWICE DAILY MIX 50% PEROXIDE WITH 50% NORMAL SALINE AND CLEAN ALL PIN SITES WITH GAUZE SOAKED IN SOLUTION. WRAP PIN SITES WITH DRY KERLEX.      Medications: Review discharge medications with patient and family  and provide education.      Current Discharge Medication List      START taking these medications    Details   cetirizine (ZYRTEC) 10 MG tablet Take 1 tablet (10 mg total) by mouth once daily.  Refills: 0      dextrose 5 % SolP 250 mL with vancomycin 1,000 mg SolR 1,500 mg Inject 1,500 mg into the vein once daily. 4/26/17=stop date.      diphenhydrAMINE (BENADRYL) 25 mg capsule Take 1 each (25 mg total) by mouth every 6 (six) hours as needed for Itching.  Refills: 0      miconazole (MICOTIN) 2 % cream Apply topically 2 (two) times daily.  Refills: 0      triamcinolone acetonide 0.1% (KENALOG) 0.1 % cream Apply topically 2 (two) times daily.         CONTINUE these medications which have CHANGED    Details   aspirin 325 MG tablet Take 1 tablet (325 mg total) by mouth once daily.  Refills: 0         CONTINUE these medications which have NOT CHANGED    Details   acetaminophen (TYLENOL) 325 MG tablet Take 325 mg by mouth every 6 (six) hours as needed for Pain.      artificial tears (ISOPTO TEARS) 0.5 % ophthalmic solution Place 1 drop into both eyes as needed.      atorvastatin (LIPITOR) 40 MG tablet TAKE ONE TABLET BY MOUTH ONCE DAILY  Qty: 90 tablet, Refills: 0      collagenase ointment Apply topically every Mon, Wed, Fri. Nursing to apply Santyl  nickel thick to wound bed and edges and cover with damp gauze (to activate santyl) daily and cover with telfa island dressing (mepore) over each pressure injury. Santyl has autolytic debridement properties and will promote healing.  Qty: 60 g, Refills: 0      CYANOCOBALAMIN, VITAMIN B-12, (VITAMIN B-12 ORAL) Take 2,500 mcg by mouth once daily.      diclofenac sodium 1 % Gel Apply 2 g topically once daily. Apply 2 g to left shoulder  Qty: 1 Tube, Refills: 0      gabapentin (NEURONTIN) 300 MG capsule Take 1 capsule (300 mg total) by mouth 3 (three) times daily.  Qty: 90 capsule, Refills: 11      melatonin 3 mg Tab Take 6 mg by mouth nightly as needed (for sleep).        omeprazole (PRILOSEC) 20 MG capsule Take 2 capsules (40 mg total) by mouth once daily.  Qty: 180 capsule, Refills: 0      oxycodone-acetaminophen (PERCOCET)  mg per tablet Take 1 tablet by mouth every 4 to 6 hours as needed for Pain.  Qty: 60 tablet, Refills: 0      tamsulosin (FLOMAX) 0.4 mg Cp24 Take 1 capsule (0.4 mg total) by mouth once daily.  Qty: 90 capsule, Refills: 3    Associated Diagnoses: Urinary frequency      venlafaxine (EFFEXOR) 75 MG tablet Take 2 tablets (150 mg total) by mouth 2 (two) times daily.  Qty: 360 tablet, Refills: 0    Associated Diagnoses: Mixed anxiety and depressive disorder      vitamin D 1000 units Tab Take 1 tablet (1,000 Units total) by mouth once daily.      zinc sulfate (ZINCATE) 220 (50) mg capsule Take 1 capsule (220 mg total) by mouth once daily.         STOP taking these medications       metoprolol tartrate (LOPRESSOR) 25 MG tablet Comments:   Reason for Stopping:         ramipril (ALTACE) 5 MG capsule Comments:   Reason for Stopping:         torsemide (DEMADEX) 20 MG Tab Comments:   Reason for Stopping:               I certify that this patient is confined to his home and needs intermittent skilled nursing care, physical therapy and occupational therapy.    Nathan Uriarte MD  PGY-1  Pager: [phone]

## 2017-03-23 NOTE — PROGRESS NOTES
[facility]  Adult Nutrition  Consult Note    SUMMARY     Recommendations    1. Continue current regular diet + Arginaid OS to promote wound healing.   2. RD to monitor & follow-up.    Goals: Pt will meet >85% EPN and EEN  Nutrition Goal Status: goal met  Communication of RD Recs: reviewed with RN    Reason for Assessment    Reason for Assessment: RD follow-up  Diagnosis: infection/sepsis (cellulitis)  Relevent Medical History: morbid obesity, HTN, HLD, CAD, Prostate ca (pending tx), CHARISMA on CPAP, depression   Interdisciplinary Rounds: did not attend     General Information Comments: Pt with improving appetite, consuming 100% of meals and OS.   Discharge planning: adequate PO intake    Nutrition Prescription Ordered    Current Diet Order: Regular     Oral Nutrition Supplement: Arginaid      Nutrition Risk Screen     Nutrition Risk Screen: no indicators present    Nutrition/Diet History    Patient Reported Diet/Restrictions/Preferences: general     Typical Food/Fluid Intake: Good appetite PTA.     Food Preferences: no cultural or Sabianism needs identified     Factors Affecting Nutritional Intake: other (see comments) (None)    Labs/Tests/Procedures/Meds    Pertinent Labs Reviewed: reviewed  Pertinent Labs Comments: Creat 1.7, GFR 46.2, Gluc 111, AST 49, ALT 87  Pertinent Medications Reviewed: reviewed    Physical Findings    Overall Physical Appearance: obese  Skin: pressure ulcer(s) (stage IV)    Anthropometrics    Height (inches): 72.99 in  Weight Method: Bed Scale  Weight (kg): (!) 149.5 kg    Ideal Body Weight (IBW), Male: 183.94 lb  % Ideal Body Weight, Male (lb): 179.18 lb     BMI (kg/m2): 43.49  BMI Grade: greater than 40 - morbid obesity    Estimated/Assessed Needs    Weight Used For Calorie Calculations: (!) 149.5 kg (329 lb 9.4 oz)   Height (cm): 185.4 cm     Energy Need Method: [place]-St Jeor, other (see comments) (2312 kcal/d)     Weight Used For Protein Calculations: (!) 149.5 kg (329  lb 9.4 oz)  Protein Requirements: 105-120 g/d    Fluid Need Method: RDA Method, other (see comments) (Per MD or 1 mL/kcal)     Monitor and Evaluation    Food and Nutrient Intake: energy intake, food and beverage intake  Food and Nutrient Adminstration: diet order     Physical Activity and Function: nutrition-related ADLs and IADLs  Anthropometric Measurements: weight, weight change, body mass index  Biochemical Data, Medical Tests and Procedures: electrolyte and renal panel, glucose/endocrine profile, gastrointestinal profile, inflammatory profile, lipid profile  Nutrition-Focused Physical Findings: overall appearance    Nutrition Risk    Level of Risk: (1x/week)    Nutrition Follow-Up    RD Follow-up?: Yes    Assessment and Plan    No nutritional dx at this time.

## 2017-03-23 NOTE — SUBJECTIVE & OBJECTIVE
Interval History: BALDO DOWNING, no complaints.      Patient is pending placement with either home health or SNF.  Patient is out of SNF days and would have to pay out of pocket, there is uncertainty on whether home health would be able to assist with the IV vancomycin on a daily basis, and patient's wife is unable to administer the IV medication.  Social workers currently on the case.    Patient has NAT, which has multiple possible precipitants: hypotension at initial admit, patient's eosinophilic reaction, patient's antibiotics and protonix, as well as patient's lower extremity infection. Cr stable at 1.7.  Appreciate nephrology eval and follow up as outpatient.    Review of Systems   Constitutional: Negative for chills and fever.   Respiratory: Negative for cough, shortness of breath and wheezing.    Cardiovascular: Negative for chest pain, palpitations and leg swelling.   Gastrointestinal: Negative for abdominal pain, constipation, diarrhea, nausea and vomiting.   Genitourinary: Positive for discharge. Negative for difficulty urinating and dysuria.     Objective:     Vital Signs (Most Recent):  Temp: 98 °F (36.7 °C) (03/23/17 1135)  Pulse: 69 (03/23/17 1135)  Resp: 18 (03/23/17 1135)  BP: (!) 110/48 (03/23/17 1135)  SpO2: (!) 94 % (03/23/17 1135) Vital Signs (24h Range):  Temp:  [97.4 °F (36.3 °C)-98.4 °F (36.9 °C)] 98 °F (36.7 °C)  Pulse:  [68-79] 69  Resp:  [15-18] 18  SpO2:  [94 %-97 %] 94 %  BP: (104-167)/(48-76) 110/48     Weight: (!) 149.5 kg (329 lb 10.8 oz)  Body mass index is 43.5 kg/(m^2).    Intake/Output Summary (Last 24 hours) at 03/23/17 1325  Last data filed at 03/23/17 1142   Gross per 24 hour   Intake             2450 ml   Output             5175 ml   Net            -2725 ml      Physical Exam   Constitutional: He appears well-developed and well-nourished. No distress.   Neck: Normal range of motion. Neck supple.   Cardiovascular: Normal rate, regular rhythm, normal heart sounds and intact distal  pulses.  Exam reveals no gallop and no friction rub.    No murmur heard.  Pulmonary/Chest: Effort normal and breath sounds normal. No respiratory distress. He has no wheezes. He has no rales.   Abdominal: Soft. Bowel sounds are normal. He exhibits no distension. There is no tenderness.   Musculoskeletal:   LLE ex fix. No ooze or pus from pins   Lymphadenopathy:     He has no cervical adenopathy.   Skin: Rash (improving) noted.       Significant Labs:   CBC:     Recent Labs  Lab 03/22/17  0400 03/23/17  0452   WBC 9.44 10.25   HGB 9.8* 10.3*   HCT 28.5* 30.8*    240     CMP:     Recent Labs  Lab 03/22/17  0400 03/23/17  0452    139   K 4.3 3.9    105   CO2 24 28    111*   BUN 20 18   CREATININE 1.7* 1.7*   CALCIUM 8.4* 9.0   PROT 5.9* 6.2   ALBUMIN 2.3* 2.5*   BILITOT 0.4 0.5   ALKPHOS 137* 142*   AST 56* 49*   ALT 90* 87*   ANIONGAP 9 6*   EGFRNONAA 40.0* 40.0*       Significant Imaging: I have reviewed all pertinent imaging results/findings within the past 24 hours.

## 2017-03-23 NOTE — PLAN OF CARE
Problem: Fall Risk (Adult)  Goal: Identify Related Risk Factors and Signs and Symptoms  Related risk factors and signs and symptoms are identified upon initiation of Human Response Clinical Practice Guideline (CPG)   Outcome: Ongoing (interventions implemented as appropriate)  No falls or trauma noted. Frequent rounding. Belongings in reach. Bed low and alarm on.     Problem: Patient Care Overview  Goal: Plan of Care Review  Outcome: Ongoing (interventions implemented as appropriate)  Alert and makes needs known. Wife and daughter came to visit at the bedside. Denied pain at this time. Stated he only drinks boost one time per day and they send it 3 times per day. Left upper arm picc flushes well both ports and has good return in each. Assessment on going.     Problem: Pressure Ulcer Risk (Isidoro Scale) (Adult,Obstetrics,Pediatric)  Goal: Identify Related Risk Factors and Signs and Symptoms  Related risk factors and signs and symptoms are identified upon initiation of Human Response Clinical Practice Guideline (CPG)   Outcome: Ongoing (interventions implemented as appropriate)  Pins in left leg intact and care to site done today. Tolerated well no redness and edema at insertion sites no drainage. Able to wiggle toes and move leg. Dressing intact to left heel. Wound care to that site due tomorrow. turns self in bed. Antifungal creams in use to toes and foot.     Problem: Infection, Risk/Actual (Adult)  Goal: Identify Related Risk Factors and Signs and Symptoms  Related risk factors and signs and symptoms are identified upon initiation of Human Response Clinical Practice Guideline (CPG)   Outcome: Ongoing (interventions implemented as appropriate)  Afebrile and vital signs stable. onn vancomycin and next dose due at 1 am tomorrow morning and trough prior to next dose.
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
## 2017-03-23 NOTE — ASSESSMENT & PLAN NOTE
- Cr 1.0 on admission. Increased to 1.6 after pt had drug reaction event  - Negative mark stain  -Cr elevated from 1.5, stable at 1.7.  Likely multifactorial intrinsic kidney injury: patient's eosinophilic reaction, hypotension on admit, lower extremity infection, medications such as antibiotics and protonix.  - IVF  - discuss with ID about possible alternative antibiotics that may be increase Cr less.  - Renally dose medications   - Avoid nephrotoxic agents if possible  - Monitor I/O  -appreciate nephrology recs, no interventions needed, avoid steroids or nephrotoxins, follow up in renal clinic in 2-3 weeks with repeat labs.

## 2017-03-23 NOTE — PROGRESS NOTES
D/c instructions reviewed and given. PT verbalized understanding. PICC line remain in place for Home ABX. Pt waiting at bedside for hosp transport

## 2017-03-23 NOTE — PROGRESS NOTES
Random vanco drawn and sent to lab. Tolerated well. Was informed patient ready to leave. Belongings packed. Assessment on going.

## 2017-03-24 ENCOUNTER — OUTPATIENT CASE MANAGEMENT (OUTPATIENT)
Dept: ADMINISTRATIVE | Facility: OTHER | Age: 72
End: 2017-03-24

## 2017-03-24 ENCOUNTER — TELEPHONE (OUTPATIENT)
Dept: ADMINISTRATIVE | Facility: CLINIC | Age: 72
End: 2017-03-24

## 2017-03-24 DIAGNOSIS — N17.9 AKI (ACUTE KIDNEY INJURY): Primary | ICD-10-CM

## 2017-03-24 LAB — HHV8 IGG SER QL IF: NORMAL

## 2017-03-24 NOTE — PROGRESS NOTES
"Spoke with Mrs. Montgomery. She states home health nurse is coming today. She reports she is upset because she was told the home health nurse would be administering Vancomycin. She states "now I find out that's not the case". She reports pt was unable to go to SNF because she would have to pay a daily copay for the days he has left. Pt has a f/u appt with Dr. Cortés on 3/28. Home health nurse arrived during phone call. Nurse is scheduled for wound care 3 xweek. Pt is receiving supplies from [employer]. RN assessment/plan of care completed. Will continue to follow.   "

## 2017-03-27 ENCOUNTER — LAB VISIT (OUTPATIENT)
Dept: LAB | Facility: HOSPITAL | Age: 72
End: 2017-03-27
Attending: INTERNAL MEDICINE
Payer: MEDICARE

## 2017-03-27 ENCOUNTER — TELEPHONE (OUTPATIENT)
Dept: INTERNAL MEDICINE | Facility: CLINIC | Age: 72
End: 2017-03-27

## 2017-03-27 ENCOUNTER — PATIENT OUTREACH (OUTPATIENT)
Dept: ADMINISTRATIVE | Facility: CLINIC | Age: 72
End: 2017-03-27
Payer: MEDICARE

## 2017-03-27 DIAGNOSIS — L89.624 STAGE IV PRESSURE ULCER OF LEFT HEEL: Primary | ICD-10-CM

## 2017-03-27 LAB
ALBUMIN SERPL BCP-MCNC: 2.9 G/DL
ALP SERPL-CCNC: 128 U/L
ALT SERPL W/O P-5'-P-CCNC: 43 U/L
ANION GAP SERPL CALC-SCNC: 6 MMOL/L
AST SERPL-CCNC: 24 U/L
BASOPHILS # BLD AUTO: 0.07 K/UL
BASOPHILS NFR BLD: 0.7 %
BILIRUB SERPL-MCNC: 0.4 MG/DL
BUN SERPL-MCNC: 23 MG/DL
CALCIUM SERPL-MCNC: 8.9 MG/DL
CHLORIDE SERPL-SCNC: 107 MMOL/L
CO2 SERPL-SCNC: 27 MMOL/L
CREAT SERPL-MCNC: 1.6 MG/DL
CRP SERPL-MCNC: 24.6 MG/L
DIFFERENTIAL METHOD: ABNORMAL
EOSINOPHIL # BLD AUTO: 1.2 K/UL
EOSINOPHIL NFR BLD: 12.4 %
ERYTHROCYTE [DISTWIDTH] IN BLOOD BY AUTOMATED COUNT: 13.4 %
ERYTHROCYTE [SEDIMENTATION RATE] IN BLOOD BY WESTERGREN METHOD: 27 MM/HR
EST. GFR  (AFRICAN AMERICAN): 50 ML/MIN/1.73 M^2
EST. GFR  (NON AFRICAN AMERICAN): 43 ML/MIN/1.73 M^2
GLUCOSE SERPL-MCNC: 85 MG/DL
HCT VFR BLD AUTO: 29.9 %
HGB BLD-MCNC: 9.9 G/DL
LYMPHOCYTES # BLD AUTO: 2.6 K/UL
LYMPHOCYTES NFR BLD: 26.9 %
MCH RBC QN AUTO: 30.7 PG
MCHC RBC AUTO-ENTMCNC: 33.1 %
MCV RBC AUTO: 93 FL
MONOCYTES # BLD AUTO: 0.6 K/UL
MONOCYTES NFR BLD: 6.6 %
NEUTROPHILS # BLD AUTO: 5.2 K/UL
NEUTROPHILS NFR BLD: 53.4 %
PLATELET # BLD AUTO: 274 K/UL
PMV BLD AUTO: 10 FL
POTASSIUM SERPL-SCNC: 3.9 MMOL/L
PROT SERPL-MCNC: 6.6 G/DL
RBC # BLD AUTO: 3.22 M/UL
SODIUM SERPL-SCNC: 140 MMOL/L
VANCOMYCIN TROUGH SERPL-MCNC: 11.5 UG/ML
WBC # BLD AUTO: 9.73 K/UL

## 2017-03-27 PROCEDURE — 86140 C-REACTIVE PROTEIN: CPT

## 2017-03-27 PROCEDURE — 80202 ASSAY OF VANCOMYCIN: CPT

## 2017-03-27 PROCEDURE — 85025 COMPLETE CBC W/AUTO DIFF WBC: CPT

## 2017-03-27 PROCEDURE — 36415 COLL VENOUS BLD VENIPUNCTURE: CPT

## 2017-03-27 PROCEDURE — 85651 RBC SED RATE NONAUTOMATED: CPT

## 2017-03-27 PROCEDURE — 80053 COMPREHEN METABOLIC PANEL: CPT

## 2017-03-27 NOTE — PATIENT INSTRUCTIONS
Discharge Instructions for Cellulitis  You have been diagnosed with cellulitis. This is an infection in the deepest layer of the skin. In some cases, the infection also affects the muscle. Cellulitis is caused by bacteria. The bacteria can enter the body through broken skin. This can happen with a cut, scratch, animal bite, or an insect bite that has been scratched. You may have been treated in the hospital with antibiotics and fluids. You will likely be given a prescription for antibiotics to take at home. This sheet will help you take care of yourself at home.  Home care  When you are home:  · Take the prescribed antibiotic medicine you are given as directed until it is gone. Take it even if you feel better. It treats the infection and stops it from returning. Not taking all the medicine can make future infections hard to treat.  · Keep the infected area clean.  · When possible, raise the infected area above the level of your heart. This helps keep swelling down.  · Talk with your healthcare provider if you are in pain. Ask what kind of over-the-counter medicine you can take for pain.  · Apply clean bandages as advised.  · Take your temperature once a day for a week.  · Wash your hands often to prevent spreading the infection.  In the future, wash your hands before and after you touch cuts, scratches, or bandages. This will help prevent infection.   When to call your healthcare provider  Call your healthcare provider immediately if you have any of the following:  · Difficulty or pain when moving the joints above or below the infected area  · Discharge or pus draining from the area  · Fever of 100.4°F (38°C) or higher, or as directed by your healthcare provider  · Pain that gets worse in or around the infected   · Redness that gets worse in or around the infected area, particularly if the area of redness expands to a wider area  · Shaking chills  · Swelling of the infected area  · Vomiting   Date Last Reviewed:  8/1/2016  © 9364-7659 [employer]. [address] [place] [address], [place] [address]. All rights reserved. This information is not intended as a substitute for professional medical care. Always follow your healthcare professional's instructions.

## 2017-03-28 ENCOUNTER — OFFICE VISIT (OUTPATIENT)
Dept: INFECTIOUS DISEASES | Facility: CLINIC | Age: 72
End: 2017-03-28
Payer: MEDICARE

## 2017-03-28 ENCOUNTER — OFFICE VISIT (OUTPATIENT)
Dept: ORTHOPEDICS | Facility: CLINIC | Age: 72
End: 2017-03-28
Payer: MEDICARE

## 2017-03-28 ENCOUNTER — OFFICE VISIT (OUTPATIENT)
Dept: PRIMARY CARE CLINIC | Facility: CLINIC | Age: 72
End: 2017-03-28
Payer: MEDICARE

## 2017-03-28 VITALS
WEIGHT: 315 LBS | HEART RATE: 79 BPM | HEIGHT: 73 IN | SYSTOLIC BLOOD PRESSURE: 116 MMHG | BODY MASS INDEX: 41.75 KG/M2 | DIASTOLIC BLOOD PRESSURE: 60 MMHG | OXYGEN SATURATION: 96 %

## 2017-03-28 VITALS
HEART RATE: 82 BPM | TEMPERATURE: 98 F | DIASTOLIC BLOOD PRESSURE: 72 MMHG | SYSTOLIC BLOOD PRESSURE: 132 MMHG | HEIGHT: 73 IN | BODY MASS INDEX: 41.75 KG/M2 | WEIGHT: 315 LBS

## 2017-03-28 VITALS — HEIGHT: 73 IN

## 2017-03-28 DIAGNOSIS — F41.1 GENERALIZED ANXIETY DISORDER: ICD-10-CM

## 2017-03-28 DIAGNOSIS — N13.8 BPH (BENIGN PROSTATIC HYPERTROPHY) WITH URINARY OBSTRUCTION: ICD-10-CM

## 2017-03-28 DIAGNOSIS — I25.10 CORONARY ARTERY DISEASE INVOLVING NATIVE CORONARY ARTERY OF NATIVE HEART WITHOUT ANGINA PECTORIS: ICD-10-CM

## 2017-03-28 DIAGNOSIS — S82.402N TRAUMATIC TYPE III OPEN FRACTURE OF SHAFT OF LEFT TIBIA AND FIBULA WITH NONUNION: Primary | ICD-10-CM

## 2017-03-28 DIAGNOSIS — E78.5 HYPERLIPIDEMIA, UNSPECIFIED HYPERLIPIDEMIA TYPE: ICD-10-CM

## 2017-03-28 DIAGNOSIS — Z88.3: ICD-10-CM

## 2017-03-28 DIAGNOSIS — I48.0 PAROXYSMAL ATRIAL FIBRILLATION: ICD-10-CM

## 2017-03-28 DIAGNOSIS — L97.529 NON-PRESSURE CHRONIC ULCER OF OTHER PART OF LEFT FOOT WITH UNSPECIFIED SEVERITY: ICD-10-CM

## 2017-03-28 DIAGNOSIS — S82.202N TRAUMATIC TYPE III OPEN FRACTURE OF SHAFT OF LEFT TIBIA AND FIBULA WITH NONUNION: Primary | ICD-10-CM

## 2017-03-28 DIAGNOSIS — L03.116 CELLULITIS OF LEFT FOOT: Primary | ICD-10-CM

## 2017-03-28 DIAGNOSIS — N40.1 BPH (BENIGN PROSTATIC HYPERTROPHY) WITH URINARY OBSTRUCTION: ICD-10-CM

## 2017-03-28 DIAGNOSIS — G47.33 OBSTRUCTIVE SLEEP APNEA ON CPAP: ICD-10-CM

## 2017-03-28 DIAGNOSIS — I10 ESSENTIAL HYPERTENSION: ICD-10-CM

## 2017-03-28 DIAGNOSIS — L03.116 CELLULITIS OF LEFT LOWER EXTREMITY: Primary | ICD-10-CM

## 2017-03-28 DIAGNOSIS — N17.9 AKI (ACUTE KIDNEY INJURY): ICD-10-CM

## 2017-03-28 PROBLEM — L03.90 CELLULITIS: Status: RESOLVED | Noted: 2017-03-15 | Resolved: 2017-03-28

## 2017-03-28 PROBLEM — L03.114 CELLULITIS OF LEFT UPPER EXTREMITY: Status: RESOLVED | Noted: 2017-03-16 | Resolved: 2017-03-28

## 2017-03-28 PROCEDURE — 99999 PR PBB SHADOW E&M-EST. PATIENT-LVL V: CPT | Mod: PBBFAC,,, | Performed by: NURSE PRACTITIONER

## 2017-03-28 PROCEDURE — 99499 UNLISTED E&M SERVICE: CPT | Mod: S$GLB,,, | Performed by: NURSE PRACTITIONER

## 2017-03-28 PROCEDURE — 99999 PR PBB SHADOW E&M-EST. PATIENT-LVL III: CPT | Mod: PBBFAC,,, | Performed by: PHYSICIAN ASSISTANT

## 2017-03-28 PROCEDURE — 99999 PR PBB SHADOW E&M-EST. PATIENT-LVL II: CPT | Mod: PBBFAC,,, | Performed by: ORTHOPAEDIC SURGERY

## 2017-03-28 PROCEDURE — 1159F MED LIST DOCD IN RCRD: CPT | Mod: S$GLB,,, | Performed by: PHYSICIAN ASSISTANT

## 2017-03-28 PROCEDURE — 1157F ADVNC CARE PLAN IN RCRD: CPT | Mod: S$GLB,,, | Performed by: PHYSICIAN ASSISTANT

## 2017-03-28 PROCEDURE — 99024 POSTOP FOLLOW-UP VISIT: CPT | Mod: S$GLB,,, | Performed by: ORTHOPAEDIC SURGERY

## 2017-03-28 PROCEDURE — 1126F AMNT PAIN NOTED NONE PRSNT: CPT | Mod: S$GLB,,, | Performed by: PHYSICIAN ASSISTANT

## 2017-03-28 PROCEDURE — 80202 ASSAY OF VANCOMYCIN: CPT

## 2017-03-28 PROCEDURE — 36415 COLL VENOUS BLD VENIPUNCTURE: CPT

## 2017-03-28 PROCEDURE — 3075F SYST BP GE 130 - 139MM HG: CPT | Mod: S$GLB,,, | Performed by: PHYSICIAN ASSISTANT

## 2017-03-28 PROCEDURE — 3078F DIAST BP <80 MM HG: CPT | Mod: S$GLB,,, | Performed by: PHYSICIAN ASSISTANT

## 2017-03-28 PROCEDURE — 1160F RVW MEDS BY RX/DR IN RCRD: CPT | Mod: S$GLB,,, | Performed by: PHYSICIAN ASSISTANT

## 2017-03-28 PROCEDURE — 99213 OFFICE O/P EST LOW 20 MIN: CPT | Mod: S$GLB,,, | Performed by: PHYSICIAN ASSISTANT

## 2017-03-28 PROCEDURE — 99499 UNLISTED E&M SERVICE: CPT | Mod: S$GLB,,, | Performed by: ORTHOPAEDIC SURGERY

## 2017-03-28 PROCEDURE — 99499 UNLISTED E&M SERVICE: CPT | Mod: S$GLB,,, | Performed by: PHYSICIAN ASSISTANT

## 2017-03-28 RX ORDER — OMEPRAZOLE 20 MG/1
20 CAPSULE, DELAYED RELEASE ORAL DAILY
Qty: 180 CAPSULE | Refills: 0
Start: 2017-03-28 | End: 2017-04-26
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
## 2017-03-28 NOTE — MR AVS SNAPSHOT
[facility] - Infectious Diseases  [address]  [facility] [address]  Phone: [phone]  Fax: [phone]                  Janusz Montgomery Jr.   3/28/2017 1:00 PM   Office Visit    Description:  Male : 1945   Provider:  Benny Cannon PA-C   Department:  [facility] - Infectious Diseases           Reason for Visit     Follow-up                To Do List           Future Appointments        Provider Department Dept Phone    3/28/2017 1:00 PM Benny Cannon PA-C [facility] Infectious Diseases [phone]    3/30/2017 11:00 AM Luis Palacio MD [facility] - Arrhythmia [phone]    4/3/2017 8:00 AM [facility], [place] [facility] [phone]    2017 10:15 AM Walter Cortés MD [facility] Orthopedics [phone]    2017 2:00 PM JIMMY Meléndez, ANP [facility] Infectious Diseases [phone]      Goals (5 Years of Data)     Patient/caregiver will have an action plan in place to manage and prevent complications of  infectio/wound prior to discharge from [facility].     Notes - Note created  3/24/2017  3:11 PM by Delfina Sykes RN    Overall Time to Completion  2 months from 2017    Short Term Goals  Patient/caregiver will discuss health care needs with Physician and care team during visits or using Patient Portal.  Interventions   · Empower patient/caregiver to discuss treatment plan with Physician/care team.   Status  · Met    Patient/caregiver will maintain follow-up appointment with Physician within 1 week.  Interventions   · Encourage compliance with Physician follow-ups.   Status  · Partially Met    Patient/caregiver will verbalize 3 signs and symptoms of Infectious Disease within 2 weeks.   Interventions   · Recognize and provide educational material (SUKUMAR).   Status  · Partially met    Patient/caregiver will verbalize 3 ways of preventing complications due to disease process within 2 weeks.  Interventions   · Discuss appropriate use of Home health with  patient/caregiver.  · Recognize and provide educational material (SUKUMAR).   Status  · Partially met          Patient/caregiver will have knowledge of resources available in order to obtain the services that are needed prior to discharge from [facility].     Notes - Note edited  3/24/2017  3:11 PM by Delfina Sykes RN    Overall Time to Completion  1 month from 03/17/2017    Short Term Goals  Patient/caregiver will verbalize understanding and will follow hospital discharge plan 2 weeks.  Interventions   · Collaborate with Inpatient case management regarding Discharge Plan.   Status  · Partially met  Met          [facility]     [facility] - 24/7 Assistance  Registered nurses in the [facility] provide clinical advisement, health education, appointment booking, and other advisory services.  Call for this free service at [phone].             Medications           Message regarding Medications     Verify the changes and/or additions to your medication regime listed below are the same as discussed with your clinician today.  If any of these changes or additions are incorrect, please notify your healthcare provider.             Verify that the below list of medications is an accurate representation of the medications you are currently taking.  If none reported, the list may be blank. If incorrect, please contact your healthcare provider. Carry this list with you in case of emergency.           Current Medications     acetaminophen (TYLENOL) 325 MG tablet Take 325 mg by mouth every 6 (six) hours as needed for Pain.    artificial tears (ISOPTO TEARS) 0.5 % ophthalmic solution Place 1 drop into both eyes as needed.    aspirin 325 MG tablet Take 1 tablet (325 mg total) by mouth once daily.    atorvastatin (LIPITOR) 40 MG tablet TAKE ONE TABLET BY MOUTH ONCE DAILY    cetirizine (ZYRTEC) 10 MG tablet Take 1 tablet (10 mg total) by mouth once daily.    collagenase ointment Apply topically  "every Mon, Wed, Fri. Nursing to apply Santyl  nickel thick to wound bed and edges and cover with damp gauze (to activate santyl) daily and cover with telfa island dressing (mepore) over each pressure injury. Santyl has autolytic debridement properties and will promote healing.    CYANOCOBALAMIN, VITAMIN B-12, (VITAMIN B-12 ORAL) Take 2,500 mcg by mouth once daily.    dextrose 5 % SolP 250 mL with vancomycin 1,000 mg SolR 1,500 mg Inject 1,500 mg into the vein once daily. 4/26/17=stop date.    diclofenac sodium 1 % Gel Apply 2 g topically once daily. Apply 2 g to left shoulder    diphenhydrAMINE (BENADRYL) 25 mg capsule Take 1 each (25 mg total) by mouth every 6 (six) hours as needed for Itching.    gabapentin (NEURONTIN) 300 MG capsule Take 1 capsule (300 mg total) by mouth 3 (three) times daily.    melatonin 3 mg Tab Take 6 mg by mouth nightly as needed (for sleep).     miconazole (MICOTIN) 2 % cream Apply topically 2 (two) times daily.    omeprazole (PRILOSEC) 20 MG capsule Take 1 capsule (20 mg total) by mouth once daily.    oxycodone-acetaminophen (PERCOCET)  mg per tablet Take 1 tablet by mouth every 4 to 6 hours as needed for Pain.    tamsulosin (FLOMAX) 0.4 mg Cp24 Take 1 capsule (0.4 mg total) by mouth once daily.    venlafaxine (EFFEXOR) 75 MG tablet Take 2 tablets (150 mg total) by mouth 2 (two) times daily.    vitamin D 1000 units Tab Take 1 tablet (1,000 Units total) by mouth once daily.    zinc sulfate (ZINCATE) 220 (50) mg capsule Take 1 capsule (220 mg total) by mouth once daily.           Clinical Reference Information           Your Vitals Were     BP Pulse Temp Height Weight BMI    132/72 82 97.7 °F (36.5 °C) (Oral) 6' 1" (1.854 m) 145.2 kg (320 lb) 42.22 kg/m2      Blood Pressure          Most Recent Value    BP  132/72      Allergies as of 3/28/2017     Ciprofloxacin    Zosyn [Piperacillin-tazobactam]    Bacitracin      Immunizations Administered on Date of Encounter - 3/28/2017     None    "   Language Assistance Services     ATTENTION: Language assistance services are available, free of charge. Please call [phone].      ATENCIÓN: Si habla michael, tiene a robison disposición servicios gratuitos de asistencia lingüística. Llame al [phone].     CHÚ Ý: N?u b?n nói Ti?ng Vi?t, có các d?ch v? h? tr? ngôn ng? mi?n phí dành cho b?n. G?i s? [phone].         [facility] - Infectious [facility] complies with applicable Federal civil rights laws and does not discriminate on the basis of race, color, national origin, age, disability, or sex.

## 2017-03-28 NOTE — PROGRESS NOTES
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 List.    ASSESSMENT & PLAN:     HIGH RISK CONDITION(S):         Recent admission for Cellulitic Process 2/2 chronic ulcer of left foot with:   *During admit, Orthopedic surgery consulted; they recommended strict elevation above heart at all times, and dressing changes.  *Of note, ID progress note while patient was inpatient:   "left heel ulceration due to the accident, so the plan was to maintain the external fixator until this ulcer healed ; ulcer grew Pseudomonas and prevotella spp., so he was started on IV treatment with pip/tazo and ciprofloxacin, then after 1+ weeks on this treatment, he developed fevers and diffuse maculopapular rash which were concerning for possible drug reaction.   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  - follow up in renal clinic in 2-3 weeks " "with repeat labs (per Nephro's recs prior to discharge); scheduled follow up on 4/12; of note, patient reports that "home health care nurse is drawing labs every week for ID already".          Essential hypertension:  Per JNC 8, goal < 150/90  Today: 116/60 (controlled)   *Discharged without anti hypertensive agents; stopped during most recent admission due to hypotension 2/2 sepsis. Presents today normotensive. Will not resume without indication.          Recent admission for Drug allergy, anti-infective:  (Addressed during hospitalization)  *Med Card confirmed and updated with allergies listed to Cipro and Zosyn   *Of note, patient with recent drug reaction, with ciprofloxacin and/or zosyn; remains unclear at this time which agent caused the event, but per allergy consult, likely zosyn as beta lactam allergy more likely; patient with marked improvement of rash and symptoms with cessation of medications; prior to discharge, mostly resolved      Hyperlipidemia:  Lab Results   Component Value Date    CHOL 155 04/13/2016    CHOL 164 03/31/2015    CHOL 156 07/24/2014     Lab Results   Component Value Date    HDL 35 (L) 04/13/2016    HDL 32 (L) 03/31/2015    HDL 31 (L) 07/24/2014     Lab Results   Component Value Date    LDLCALC 96.6 04/13/2016    LDLCALC 100.6 03/31/2015    LDLCALC 87.8 07/24/2014     Lab Results   Component Value Date    TRIG 117 04/13/2016    TRIG 157 (H) 03/31/2015    TRIG 186 (H) 07/24/2014     Lab Results   Component Value Date    CHOLHDL 22.6 04/13/2016    CHOLHDL 19.5 (L) 03/31/2015    CHOLHDL 19.9 (L) 07/24/2014   - continue statin therapy         Coronary artery disease involving native coronary artery of native heart without angina pectoris s/p RCA stent:  (stable)  - continue ASA and Statin  -  beta blocker and ACEI       Paroxysmal A Fib (dx: 7/2016):   Rate controlled: 79  Was started on Beta Blocker (Metoprolol) and followed by EP since, however was subsequently discontinued on most " "recent admission and not resumed at discharge, due to issues with hypotension; at present, blood pressures are not permissible and HR is controlled.   Will have him follow up with EP (apt scheduled 3/30)  - continue  mg therapy       GERD (gastroesophageal reflux disease):  (stable and  Controlled)   - continue PPI therapy (will start to wean; taking for "reflux problems only but want to get off; started taking 1 pill (=20mg) since discharge".          History of Anxiety Disorder:   (controlled)   - continue Effexor therapy         Morbid obesity with BMI of 40.0-44.9, adult  - weight 330 # currently (150 kg)        BPH:  Will follow up with Dr. Minor ( Urologist)  - Flomax      CHARISMA:  CPAP compliant (Setting: ~ 14)      Chronic long standing use of PPI therapy without indications:  Will wean to 20 mg daily x 4 weeks (until seen by PCP on 4/28); if without any "issues" during this time, would suggest stopping this medication at this time.         Priority Clinic Visit (Post Discharge Follow-up) Today:   - Our clinic physicians and nurses plan to follow the patient up for any medical issues in the Priority Clinic for 30 days post discharge.      Future Appointments  Date Time Provider Department Center   3/28/2017 1:00 PM Benny Cannon PA-C [facility] ID [facility]   3/30/2017 11:00 AM Luis Palacio MD [facility] ARRHYTH [facility]   4/3/2017 8:00 AM LAB, [facility] [place]   4/11/2017 10:15 AM Walter Cortés MD [facility] ORTHO [facility]   4/12/2017 2:40 PM Jessica Herndon MD [facility] NEPHRO [facility]   4/19/2017 10:00 AM Navdeep Guardado MD [facility] CARDIO [facility]   4/28/2017 2:00 PM Miguelina Weathers MD [facility] FAM MED [place]          Medication List          This list is accurate as of: 3/28/17 11:13 AM.  Always use your most recent med list.                     acetaminophen 325 MG tablet   Commonly known as:  TYLENOL       artificial tears 0.5 % ophthalmic solution   Commonly known as:  ISOPTO TEARS   Place 1 drop into " both eyes as needed.       aspirin 325 MG tablet   Take 1 tablet (325 mg total) by mouth once daily.       atorvastatin 40 MG tablet   Commonly known as:  LIPITOR   TAKE ONE TABLET BY MOUTH ONCE DAILY       cetirizine 10 MG tablet   Commonly known as:  ZYRTEC   Take 1 tablet (10 mg total) by mouth once daily.       collagenase ointment   Apply topically every Mon, Wed, Fri. Nursing to apply Santyl  nickel thick to wound bed and edges and cover with damp gauze (to activate santyl) daily and cover with telfa island dressing (mepore) over each pressure injury. Santyl has autolytic debridement properties and will promote healing.       dextrose 5 % SolP 250 mL with vancomycin 1,000 mg SolR 1,500 mg   Inject 1,500 mg into the vein once daily. 4/26/17=stop date.       diclofenac sodium 1 % Gel   Apply 2 g topically once daily. Apply 2 g to left shoulder       diphenhydrAMINE 25 mg capsule   Commonly known as:  BENADRYL   Take 1 each (25 mg total) by mouth every 6 (six) hours as needed for Itching.       gabapentin 300 MG capsule   Commonly known as:  NEURONTIN   Take 1 capsule (300 mg total) by mouth 3 (three) times daily.       melatonin 3 mg Tab       miconazole 2 % cream   Commonly known as:  MICOTIN   Apply topically 2 (two) times daily.       omeprazole 20 MG capsule   Commonly known as:  PRILOSEC   Take 1 capsule (20 mg total) by mouth once daily.       oxycodone-acetaminophen  mg per tablet   Commonly known as:  PERCOCET   Take 1 tablet by mouth every 4 to 6 hours as needed for Pain.       tamsulosin 0.4 mg Cp24   Commonly known as:  FLOMAX   Take 1 capsule (0.4 mg total) by mouth once daily.       venlafaxine 75 MG tablet   Commonly known as:  EFFEXOR   Take 2 tablets (150 mg total) by mouth 2 (two) times daily.       VITAMIN B-12 ORAL       vitamin D 1000 units Tab   Take 1 tablet (1,000 Units total) by mouth once daily.       zinc sulfate 220 (50) mg capsule   Commonly known as:  ZINCATE   Take 1 capsule  (220 mg total) by mouth once daily.            Where to Get Your Medications      Information about where to get these medications is not yet available     ! Ask your nurse or doctor about these medications     omeprazole 20 MG capsule             Signing Physician:  KAVITHA Car

## 2017-03-28 NOTE — PROGRESS NOTES
PharmD Priority Clinic Note      Date of Discharge: 3/23/17      Patient presents to clinic for follow-up after recent discharge from the hospital on 3/23/17. Patient was admitted on 3/14/17 for cellulitis.  Patient presents with his wife and daughter for this visit.  He continues to receive  The Vancomycin for treatment of his cellulitis.  He reports no adverse effects from use.  With regards to his other medications, the patient has good knowledge of what each are utilized for and how they should be taken.  He reports no other concerns with regards to medication use.    Patient's medication regimen at discharge was as follows:     artificial tears 0.5 % ophthalmic solution   Commonly known as:  ISOPTO TEARS   Place 1 drop into both eyes as needed.       aspirin 325 MG tablet   Take 1 tablet (325 mg total) by mouth once daily.       atorvastatin 40 MG tablet   Commonly known as:  LIPITOR   TAKE ONE TABLET BY MOUTH ONCE DAILY       cetirizine 10 MG tablet   Commonly known as:  ZYRTEC   Take 1 tablet (10 mg total) by mouth once daily.       collagenase ointment   Apply topically every Mon, Wed, Fri. Nursing to apply Santyl  nickel thick to wound bed and edges and cover with damp gauze (to activate santyl) daily and cover with telfa island dressing (mepore) over each pressure injury. Santyl has autolytic debridement properties and will promote healing.       dextrose 5 % SolP 250 mL with vancomycin 1,000 mg SolR 1,500 mg   Inject 1,500 mg into the vein once daily. 4/26/17=stop date.       diclofenac sodium 1 % Gel   Apply 2 g topically once daily. Apply 2 g to left shoulder       diphenhydrAMINE 25 mg capsule   Commonly known as:  BENADRYL   Take 1 each (25 mg total) by mouth every 6 (six) hours as needed for Itching.       gabapentin 300 MG capsule   Commonly known as:  NEURONTIN   Take 1 capsule (300 mg total) by mouth 3 (three) times daily.       melatonin 3 mg Tab       miconazole 2 % cream   Commonly known as:   MICOTIN   Apply topically 2 (two) times daily.       omeprazole 20 MG capsule   Commonly known as:  PRILOSEC   Take 2 capsules (40 mg total) by mouth once daily.       oxycodone-acetaminophen  mg per tablet   Commonly known as:  PERCOCET   Take 1 tablet by mouth every 4 to 6 hours as needed for Pain.       tamsulosin 0.4 mg Cp24   Commonly known as:  FLOMAX   Take 1 capsule (0.4 mg total) by mouth once daily.       venlafaxine 75 MG tablet   Commonly known as:  EFFEXOR   Take 2 tablets (150 mg total) by mouth 2 (two) times daily.       VITAMIN B-12 ORAL       vitamin D 1000 units Tab   Take 1 tablet (1,000 Units total) by mouth once daily.       zinc sulfate 220 (50) mg capsule   Commonly known as:  ZINCATE   Take 1 capsule (220 mg total) by mouth once daily.             Medications have been reviewed and reconciled.    In home Medication list is consistent with discharge medication regimen.       Medication discrepancies identified: None Identified    Wt Readings from Last 3 Encounters:   03/28/17 (!) 145.2 kg (320 lb)   03/22/17 (!) 149.5 kg (329 lb 10.8 oz)   03/13/17 (!) 150.1 kg (330 lb 14.6 oz)     Temp Readings from Last 3 Encounters:   03/23/17 98.4 °F (36.9 °C) (Oral)   03/14/17 98.2 °F (36.8 °C) (Oral)   03/03/17 98.3 °F (36.8 °C) (Oral)     BP Readings from Last 3 Encounters:   03/28/17 116/60   03/23/17 136/79   03/14/17 (!) 84/40     Pulse Readings from Last 3 Encounters:   03/28/17 79   03/23/17 69   03/14/17 65     Lab Results   Component Value Date    CREATININE 1.6 (H) 03/27/2017    BUN 23 03/27/2017     03/27/2017    K 3.9 03/27/2017     03/27/2017    CO2 27 03/27/2017     Lab Results   Component Value Date    CHOL 155 04/13/2016    CHOL 164 03/31/2015    CHOL 156 07/24/2014     Lab Results   Component Value Date    HDL 35 (L) 04/13/2016    HDL 32 (L) 03/31/2015    HDL 31 (L) 07/24/2014     Lab Results   Component Value Date    LDLCALC 96.6 04/13/2016    LDLCALC 100.6 03/31/2015     LDLCALC 87.8 07/24/2014     Lab Results   Component Value Date    TRIG 117 04/13/2016    TRIG 157 (H) 03/31/2015    TRIG 186 (H) 07/24/2014     Lab Results   Component Value Date    CHOLHDL 22.6 04/13/2016    CHOLHDL 19.5 (L) 03/31/2015    CHOLHDL 19.9 (L) 07/24/2014       Medication Therapy Plan for Medication related problems since discharge and Associated Resolutions:    1. Medication education needs: Yes  Associated Resolutions: General Medication education counseling provided    2. No Indication for a medication: Yes (Patient has been taking prilosec for soem time now and reports no problems with reflux symptoms.)  Associated Resolutions: Dosage Adjustment recommended      Medication Therapy Plan:  1. Medication education needs  General Medication education counseling provided    2. No Indication for a medication  Patient has been taking prilosec for soem time now and reports no problems with reflux symptoms. Recommend to decrease dose of omeprazole to one tablet daily.      Narda Atkinson, PharmD, BCPS   Clinical Pharmacist, Internal Medicine

## 2017-03-28 NOTE — PATIENT INSTRUCTIONS
1) Start taking the Protonix 20 mg daily tilll 4/28, then discontinue    Priority Clinic Visit (Post Discharge Follow-up) Today:   - Our clinic physicians and nurses plan to follow the patient up for any medical issues in the Priority Clinic for 30 days post discharge.    Future Appointments  Date Time Provider Department Center   3/28/2017 1:00 PM Benny Cannon PA-C [facility] ID [facility]   3/30/2017 11:00 AM Luis Palacio MD [facility] ARRHYTH [facility]   4/3/2017 8:00 AM LAB, [facility]   4/11/2017 10:15 AM Walter Cortés MD [facility] ORTHO [facility]   4/12/2017 2:40 PM Jessica Herndon MD [facility] NEPHRO [facility]   4/19/2017 10:00 AM Navdeep Guardado MD [facility] CARDIO [facility]   4/28/2017 2:00 PM Miguelina Weathers MD [facility] MED [place]

## 2017-03-28 NOTE — MR AVS SNAPSHOT
[facility]  [address]  [facility] [address]  Phone: [phone]                  Janusz Montgomery JrNgoc   3/28/2017 10:00 AM   Office Visit    Description:  Male : 1945   Provider:  KAVITHA Ward   Department:  [facility]           Reason for Visit     Hospital Follow Up           Diagnoses this Visit        Comments    Cellulitis of left lower extremity    -  Primary     Non-pressure chronic ulcer of other part of left foot with unspecified severity         Essential hypertension         Coronary artery disease involving native coronary artery of native heart without angina pectoris         Paroxysmal atrial fibrillation         NAT (acute kidney injury)         Hyperlipidemia, unspecified hyperlipidemia type         Drug allergy, anti-infective         BPH (benign prostatic hypertrophy) with urinary obstruction         Obstructive sleep apnea on CPAP         Generalized anxiety disorder                To Do List           Future Appointments        Provider Department Dept Phone    3/28/2017 1:00 PM KAMAR Fierro [facility] - Infectious Diseases [phone]    3/30/2017 11:00 AM Luis Palacio MD [facility] - Arrhythmia [phone]    4/3/2017 8:00 AM LAB, [facility] [phone]    2017 10:15 AM Walter Cortés MD [facility] - Orthopedics [phone]    2017 2:40 PM Jessica Herndon MD [facility] - Nephrology [phone]      Goals (5 Years of Data)     Patient/caregiver will have an action plan in place to manage and prevent complications of  infectio/wound prior to discharge from [facility].     Notes - Note created  3/24/2017  3:11 PM by Delfina Sykes RN    Overall Time to Completion  2 months from 2017    Short Term Goals  Patient/caregiver will discuss health care needs with Physician and care team during visits or using Patient Portal.  Interventions   · Empower patient/caregiver to discuss  treatment plan with Physician/care team.   Status  · Met    Patient/caregiver will maintain follow-up appointment with Physician within 1 week.  Interventions   · Encourage compliance with Physician follow-ups.   Status  · Partially Met    Patient/caregiver will verbalize 3 signs and symptoms of Infectious Disease within 2 weeks.   Interventions   · Recognize and provide educational material (SUKUMAR).   Status  · Partially met    Patient/caregiver will verbalize 3 ways of preventing complications due to disease process within 2 weeks.  Interventions   · Discuss appropriate use of Home health with patient/caregiver.  · Recognize and provide educational material (SUKUMAR).   Status  · Partially met          Patient/caregiver will have knowledge of resources available in order to obtain the services that are needed prior to discharge from [facility].     Notes - Note edited  3/24/2017  3:11 PM by Delfina Sykes RN    Overall Time to Completion  1 month from 03/17/2017    Short Term Goals  Patient/caregiver will verbalize understanding and will follow hospital discharge plan 2 weeks.  Interventions   · Collaborate with Inpatient case management regarding Discharge Plan.   Status  · Partially met  Met           These Medications        Disp Refills Start End    omeprazole (PRILOSEC) 20 MG capsule 180 capsule 0 3/28/2017     Take 1 capsule (20 mg total) by mouth once daily. - Oral    Pharmacy: [address] Ph #: [phone]         [facility] On Call     [facility] On Call Nurse Care Line - 24/7 Assistance  Registered nurses in the [facility] provide clinical advisement, health education, appointment booking, and other advisory services.  Call for this free service at [phone].             Medications           Message regarding Medications     Verify the changes and/or additions to your medication regime listed below are the same as discussed with your  clinician today.  If any of these changes or additions are incorrect, please notify your healthcare provider.        CHANGE how you are taking these medications     Start Taking Instead of    omeprazole (PRILOSEC) 20 MG capsule omeprazole (PRILOSEC) 20 MG capsule    Dosage:  Take 1 capsule (20 mg total) by mouth once daily. Dosage:  Take 2 capsules (40 mg total) by mouth once daily.    Reason for Change:  Reorder       STOP taking these medications     triamcinolone acetonide 0.1% (KENALOG) 0.1 % cream Apply topically 2 (two) times daily.           Verify that the below list of medications is an accurate representation of the medications you are currently taking.  If none reported, the list may be blank. If incorrect, please contact your healthcare provider. Carry this list with you in case of emergency.           Current Medications     acetaminophen (TYLENOL) 325 MG tablet Take 325 mg by mouth every 6 (six) hours as needed for Pain.    artificial tears (ISOPTO TEARS) 0.5 % ophthalmic solution Place 1 drop into both eyes as needed.    aspirin 325 MG tablet Take 1 tablet (325 mg total) by mouth once daily.    atorvastatin (LIPITOR) 40 MG tablet TAKE ONE TABLET BY MOUTH ONCE DAILY    cetirizine (ZYRTEC) 10 MG tablet Take 1 tablet (10 mg total) by mouth once daily.    collagenase ointment Apply topically every Mon, Wed, Fri. Nursing to apply Santyl  nickel thick to wound bed and edges and cover with damp gauze (to activate santyl) daily and cover with telfa island dressing (mepore) over each pressure injury. Santyl has autolytic debridement properties and will promote healing.    CYANOCOBALAMIN, VITAMIN B-12, (VITAMIN B-12 ORAL) Take 2,500 mcg by mouth once daily.    dextrose 5 % SolP 250 mL with vancomycin 1,000 mg SolR 1,500 mg Inject 1,500 mg into the vein once daily. 4/26/17=stop date.    diclofenac sodium 1 % Gel Apply 2 g topically once daily. Apply 2 g to left shoulder    diphenhydrAMINE (BENADRYL) 25 mg  "capsule Take 1 each (25 mg total) by mouth every 6 (six) hours as needed for Itching.    gabapentin (NEURONTIN) 300 MG capsule Take 1 capsule (300 mg total) by mouth 3 (three) times daily.    melatonin 3 mg Tab Take 6 mg by mouth nightly as needed (for sleep).     miconazole (MICOTIN) 2 % cream Apply topically 2 (two) times daily.    omeprazole (PRILOSEC) 20 MG capsule Take 1 capsule (20 mg total) by mouth once daily.    oxycodone-acetaminophen (PERCOCET)  mg per tablet Take 1 tablet by mouth every 4 to 6 hours as needed for Pain.    tamsulosin (FLOMAX) 0.4 mg Cp24 Take 1 capsule (0.4 mg total) by mouth once daily.    venlafaxine (EFFEXOR) 75 MG tablet Take 2 tablets (150 mg total) by mouth 2 (two) times daily.    vitamin D 1000 units Tab Take 1 tablet (1,000 Units total) by mouth once daily.    zinc sulfate (ZINCATE) 220 (50) mg capsule Take 1 capsule (220 mg total) by mouth once daily.           Clinical Reference Information           Your Vitals Were     BP Pulse Height Weight SpO2 BMI    116/60 (BP Location: Right arm, Patient Position: Sitting) 79 6' 1" (1.854 m) 145.2 kg (320 lb) 96% 42.22 kg/m2      Blood Pressure          Most Recent Value    BP  116/60      Allergies as of 3/28/2017     Ciprofloxacin    Zosyn [Piperacillin-tazobactam]    Bacitracin      Immunizations Administered on Date of Encounter - 3/28/2017     None      Orders Placed During Today's Visit      Normal Orders This Visit    Ambulatory Referral to Electrophysiology       Instructions    1) Start taking the Protonix 20 mg daily tilll 4/28, then discontinue    2)        Language Assistance Services     ATTENTION: Language assistance services are available, free of charge. Please call [phone].      ATENCIÓN: Si cathy rodriguez, tiene a robison disposición servicios gratuitos de asistencia lingüística. Llame al [phone].     CHÚ Ý: N?u b?n nói Ti?ng Vi?t, có các d?ch v? h? tr? ngôn ng? mi?n phí dành cho b?n. G?i s? " [phone].         [facility] complies with applicable Federal civil rights laws and does not discriminate on the basis of race, color, national origin, age, disability, or sex.

## 2017-03-28 NOTE — Clinical Note
Priority Clinic Visit (Post Discharge Follow-up) Today:  - Our clinic physicians and nurses plan to follow the patient up for any medical issues in the Priority Clinic for 30 days post discharge.  Future Appointments: 3/28/2017  1:00 PM    Benny Cannon PA-C        [facility] ID        [facility] 3/30/2017  11:00 AM   Luis Palacio MD          [facility] ARRHYTH   [facility] 4/3/2017   8:00 AM    LAB, [facility]               [facility]       [facility] 4/11/2017  10:15 AM   Walter Cortés MD       [facility] ORTHO     [facility] 4/12/2017  2:40 PM    Jessica Herndon MD         [facility] NEPHRO    [facility] 4/19/2017  10:00 AM   Navdeep Guardado MD        [facility] CARDIO    [facility] 4/28/2017  2:00 PM    Miguelina Weathers MD       [facility] MED   [place]

## 2017-03-29 ENCOUNTER — TELEPHONE (OUTPATIENT)
Dept: FAMILY MEDICINE | Facility: CLINIC | Age: 72
End: 2017-03-29

## 2017-03-29 NOTE — TELEPHONE ENCOUNTER
Spoke with [facility] nurse Stanley, requesting wound care order. States patient has a new stage 2 wound to posterior malleolus (ankle area) with minimal drainage. [facility] area was cleansed and cover with 4x4. Please advise.

## 2017-03-29 NOTE — TELEPHONE ENCOUNTER
----- Message from Mitul Cox sent at 3/29/2017  9:19 AM CDT -----  Contact: Stanley/[facility] /[phone]  Stanley states that the patient developed a stage two wound on his left posterior malleolus.  She's requesting Wound Care Orders. Thank you.

## 2017-03-29 NOTE — PHYSICIAN QUERY
PT Name: Janusz Montgomery Jr.  MR #: [id number]     Physician Query Form - Documentation Clarification      CDS/: Lyudmila Bermudez               Contact information: [email or web address]    This form is a permanent document in the medical record.     Query Date: March 28, 2017    By submitting this query, we are merely seeking further clarification of documentation. Please utilize your independent clinical judgment when addressing the question(s) below.    The Medical record reflects the following:    Supporting Clinical Findings Location in Medical Record     Grade III pressure ulcer over the left heel        Dermatology Consult      Non-pressure chronic ulcer of other part of left foot with unspecified severity. left heel ulceration due to the accident, so the plan was to maintain the external fixator until this ulcer healed.         H&P, progress notes and discharge summary                                                                            Doctor, Do you agree with the dermatology consult of the left heel ulcer, Stage 3 Pressure ulcer?    Provider Use Only      [  ]  Yes      [  ]  No      [  ]  Other, please specify      [ x ] Clinically undetermined

## 2017-03-29 NOTE — TELEPHONE ENCOUNTER
Who is doing his other wound care?  He was seen in the priority clinic yesterday - did they not see it?

## 2017-03-30 ENCOUNTER — HOSPITAL ENCOUNTER (OUTPATIENT)
Dept: CARDIOLOGY | Facility: CLINIC | Age: 72
Discharge: HOME | End: 2017-03-30
Payer: MEDICARE

## 2017-03-30 ENCOUNTER — CLINICAL SUPPORT (OUTPATIENT)
Dept: ELECTROPHYSIOLOGY | Facility: CLINIC | Age: 72
End: 2017-03-30
Payer: MEDICARE

## 2017-03-30 ENCOUNTER — DOCUMENTATION ONLY (OUTPATIENT)
Dept: ELECTROPHYSIOLOGY | Facility: CLINIC | Age: 72
End: 2017-03-30

## 2017-03-30 ENCOUNTER — INITIAL CONSULT (OUTPATIENT)
Dept: ELECTROPHYSIOLOGY | Facility: CLINIC | Age: 72
End: 2017-03-30
Payer: MEDICARE

## 2017-03-30 ENCOUNTER — INFUSION (OUTPATIENT)
Dept: INFECTIOUS DISEASES | Facility: HOSPITAL | Age: 72
End: 2017-03-30
Attending: INTERNAL MEDICINE
Payer: MEDICARE

## 2017-03-30 VITALS
BODY MASS INDEX: 41.75 KG/M2 | DIASTOLIC BLOOD PRESSURE: 72 MMHG | WEIGHT: 315 LBS | HEIGHT: 73 IN | HEART RATE: 76 BPM | SYSTOLIC BLOOD PRESSURE: 126 MMHG

## 2017-03-30 DIAGNOSIS — I10 ESSENTIAL HYPERTENSION: ICD-10-CM

## 2017-03-30 DIAGNOSIS — L97.529 NON-PRESSURE CHRONIC ULCER OF OTHER PART OF LEFT FOOT WITH UNSPECIFIED SEVERITY: ICD-10-CM

## 2017-03-30 DIAGNOSIS — I25.10 CORONARY ARTERY DISEASE INVOLVING NATIVE CORONARY ARTERY OF NATIVE HEART WITHOUT ANGINA PECTORIS: ICD-10-CM

## 2017-03-30 DIAGNOSIS — G47.33 OBSTRUCTIVE SLEEP APNEA ON CPAP: ICD-10-CM

## 2017-03-30 DIAGNOSIS — I48.0 PAROXYSMAL ATRIAL FIBRILLATION: ICD-10-CM

## 2017-03-30 DIAGNOSIS — I48.0 PAROXYSMAL ATRIAL FIBRILLATION: Primary | ICD-10-CM

## 2017-03-30 LAB — VANCOMYCIN TROUGH SERPL-MCNC: 9.7 UG/ML

## 2017-03-30 PROCEDURE — 1126F AMNT PAIN NOTED NONE PRSNT: CPT | Mod: S$GLB,,, | Performed by: INTERNAL MEDICINE

## 2017-03-30 PROCEDURE — 3078F DIAST BP <80 MM HG: CPT | Mod: S$GLB,,, | Performed by: INTERNAL MEDICINE

## 2017-03-30 PROCEDURE — 93224 XTRNL ECG REC UP TO 48 HRS: CPT | Mod: S$GLB,,, | Performed by: INTERNAL MEDICINE

## 2017-03-30 PROCEDURE — 1160F RVW MEDS BY RX/DR IN RCRD: CPT | Mod: S$GLB,,, | Performed by: INTERNAL MEDICINE

## 2017-03-30 PROCEDURE — 3074F SYST BP LT 130 MM HG: CPT | Mod: S$GLB,,, | Performed by: INTERNAL MEDICINE

## 2017-03-30 PROCEDURE — 99999 PR PBB SHADOW E&M-EST. PATIENT-LVL III: CPT | Mod: PBBFAC,,, | Performed by: INTERNAL MEDICINE

## 2017-03-30 PROCEDURE — 1159F MED LIST DOCD IN RCRD: CPT | Mod: S$GLB,,, | Performed by: INTERNAL MEDICINE

## 2017-03-30 PROCEDURE — 99499 UNLISTED E&M SERVICE: CPT | Mod: S$GLB,,, | Performed by: INTERNAL MEDICINE

## 2017-03-30 PROCEDURE — 1157F ADVNC CARE PLAN IN RCRD: CPT | Mod: S$GLB,,, | Performed by: INTERNAL MEDICINE

## 2017-03-30 PROCEDURE — 93010 ELECTROCARDIOGRAM REPORT: CPT | Mod: ,,, | Performed by: INTERNAL MEDICINE

## 2017-03-30 PROCEDURE — 99205 OFFICE O/P NEW HI 60 MIN: CPT | Mod: S$GLB,,, | Performed by: INTERNAL MEDICINE

## 2017-03-30 NOTE — MR AVS SNAPSHOT
[facility]  [address]  [facility] [address]  Phone: [phone]  Fax: [phone]                  Janusz Montgomery Jr.   3/30/2017 11:00 AM   Initial consult    Description:  Male : 1945   Provider:  Luis Palacio MD   Department:  [facility]           Reason for Visit     Atrial Fibrillation           Diagnoses this Visit        Comments    Paroxysmal atrial fibrillation    -  Primary     Essential hypertension         Coronary artery disease involving native coronary artery of native heart without angina pectoris         Obstructive sleep apnea on CPAP         Non-pressure chronic ulcer of other part of left foot with unspecified severity                To Do List           Future Appointments        Provider Department Dept Phone    4/3/2017 8:00 AM LAB, [place] [facility] [phone]    2017 10:15 AM Walter Cortsé MD [facility] - Orthopedics [phone]    2017 2:00 PM JIMMY Meléndez, ANP [facility] - Infectious Diseases [phone]    2017 2:40 PM Jessica Herndon MD [facility] - Nephrology [phone]    2017 10:00 AM Navdeep Guardado MD [facility] - Cardiology [phone]      Goals (5 Years of Data)     Patient/caregiver will have an action plan in place to manage and prevent complications of  infectio/wound prior to discharge from [facility].     Notes - Note created  3/24/2017  3:11 PM by Delfina Sykes RN    Overall Time to Completion  2 months from 2017    Short Term Goals  Patient/caregiver will discuss health care needs with Physician and care team during visits or using Patient Portal.  Interventions   · Empower patient/caregiver to discuss treatment plan with Physician/care team.   Status  · Met    Patient/caregiver will maintain follow-up appointment with Physician within 1 week.  Interventions   · Encourage compliance with Physician follow-ups.   Status  · Partially Met    Patient/caregiver will  verbalize 3 signs and symptoms of Infectious Disease within 2 weeks.   Interventions   · Recognize and provide educational material (SUKUMAR).   Status  · Partially met    Patient/caregiver will verbalize 3 ways of preventing complications due to disease process within 2 weeks.  Interventions   · Discuss appropriate use of Home health with patient/caregiver.  · Recognize and provide educational material (SUKUMAR).   Status  · Partially met          Patient/caregiver will have knowledge of resources available in order to obtain the services that are needed prior to discharge from [facility].     Notes - Note edited  3/24/2017  3:11 PM by Delfina Sykes RN    Overall Time to Completion  1 month from 03/17/2017    Short Term Goals  Patient/caregiver will verbalize understanding and will follow hospital discharge plan 2 weeks.  Interventions   · Collaborate with Inpatient case management regarding Discharge Plan.   Status  · Partially met  Met          Follow-Up and Disposition     Follow-up and Disposition History      [facility]     [facility] Nurse Care Line - 24/7 Assistance  Unless otherwise directed by your provider, please contact [facility], our nurse care line that is available for 24/7 assistance.     Registered nurses in the [facility] Center provide: appointment scheduling, clinical advisement, health education, and other advisory services.  Call: [phone] (toll free)               Medications           Message regarding Medications     Verify the changes and/or additions to your medication regime listed below are the same as discussed with your clinician today.  If any of these changes or additions are incorrect, please notify your healthcare provider.             Verify that the below list of medications is an accurate representation of the medications you are currently taking.  If none reported, the list may be blank. If incorrect, please contact your healthcare provider. Carry this list  with you in case of emergency.           Current Medications     acetaminophen (TYLENOL) 325 MG tablet Take 325 mg by mouth every 6 (six) hours as needed for Pain.    artificial tears (ISOPTO TEARS) 0.5 % ophthalmic solution Place 1 drop into both eyes as needed.    aspirin 325 MG tablet Take 1 tablet (325 mg total) by mouth once daily.    atorvastatin (LIPITOR) 40 MG tablet TAKE ONE TABLET BY MOUTH ONCE DAILY    cetirizine (ZYRTEC) 10 MG tablet Take 1 tablet (10 mg total) by mouth once daily.    collagenase ointment Apply topically every Mon, Wed, Fri. Nursing to apply Santyl  nickel thick to wound bed and edges and cover with damp gauze (to activate santyl) daily and cover with telfa island dressing (mepore) over each pressure injury. Santyl has autolytic debridement properties and will promote healing.    CYANOCOBALAMIN, VITAMIN B-12, (VITAMIN B-12 ORAL) Take 2,500 mcg by mouth once daily.    dextrose 5 % SolP 250 mL with vancomycin 1,000 mg SolR 1,500 mg Inject 1,500 mg into the vein once daily. 4/26/17=stop date.    diclofenac sodium 1 % Gel Apply 2 g topically once daily. Apply 2 g to left shoulder    diphenhydrAMINE (BENADRYL) 25 mg capsule Take 1 each (25 mg total) by mouth every 6 (six) hours as needed for Itching.    gabapentin (NEURONTIN) 300 MG capsule Take 1 capsule (300 mg total) by mouth 3 (three) times daily.    melatonin 3 mg Tab Take 6 mg by mouth nightly as needed (for sleep).     miconazole (MICOTIN) 2 % cream Apply topically 2 (two) times daily.    omeprazole (PRILOSEC) 20 MG capsule Take 1 capsule (20 mg total) by mouth once daily.    oxycodone-acetaminophen (PERCOCET)  mg per tablet Take 1 tablet by mouth every 4 to 6 hours as needed for Pain.    tamsulosin (FLOMAX) 0.4 mg Cp24 Take 1 capsule (0.4 mg total) by mouth once daily.    venlafaxine (EFFEXOR) 75 MG tablet Take 2 tablets (150 mg total) by mouth 2 (two) times daily.    vitamin D 1000 units Tab Take 1 tablet (1,000 Units total) by  "mouth once daily.    zinc sulfate (ZINCATE) 220 (50) mg capsule Take 1 capsule (220 mg total) by mouth once daily.           Clinical Reference Information           Your Vitals Were     BP Pulse Height Weight BMI    126/72 76 6' 1" (1.854 m) 145.2 kg (320 lb) 42.22 kg/m2      Blood Pressure          Most Recent Value    BP  126/72      Allergies as of 3/30/2017     Ciprofloxacin    Zosyn [Piperacillin-tazobactam]    Bacitracin      Immunizations Administered on Date of Encounter - 3/30/2017     None      Orders Placed During Today's Visit     Future Labs/Procedures Expected by Expires    Holter monitor - 24 hour  As directed 3/30/2018      Language Assistance Services     ATTENTION: Language assistance services are available, free of charge. Please call [phone].      ATENCIÓN: Si brysonla michael, tiene a robison disposición servicios gratuitos de asistencia lingüística. Llame al [phone].     ANGIE Ý: N?u b?n nói Ti?ng Vi?t, có các d?ch v? h? tr? ngôn ng? mi?n phí dành cho b?n. G?i s? [phone].         [facility] complies with applicable Federal civil rights laws and does not discriminate on the basis of race, color, national origin, age, disability, or sex.        "

## 2017-03-30 NOTE — PROGRESS NOTES
IMPLANTABLE LOOP RECORDER EDUCATION CHECKLIST    4/7/17 @ 1 PM  REPORT TO THE CARDIOLOGY WAITING AREA ON 3RD FLOOR OF THE HOSPITAL (DO NOT REPORT TO CLINIC)  Directions for Reporting to Cardiology Waiting Area in the Hospital  If you park in the Parking Garage:  Take elevators to the 2nd floor  Walk up ramp and turn right by Gold Elevators  Take elevator to the 3rd floor  Upon exiting the elevator, turn away from the clinic areas  Walk long chavez around to front of hospital to area with windows overlooking [facility]  Check in at Reception Desk  OR  If family is dropping you off:  Have them drop you off at the front of the Hospital  (Near the ER, where all the flags are hung).  Take the E elevators to the 3rd floor.  Check in at the Reception Desk in the waiting room.        WASH WITH HIBICLENS ANTIBACTERIAL SOAP (SUCH AS DIAL) ON THE NIGHT BEFORE AND THE MORNING OF YOUR PROCEDURE PRIOR TO ARRIVAL    DO NOT EAT OR DRINK ANYTHING 6 HOURS BEFORE YOUR PROCEDURE    MEDICATIONS:  YOU MAY TAKE YOUR NORMAL MORNING MEDICATIONS WITH A SIP OF WATER    YOU WILL GO HOME AFTER YOUR PROCEDURE    YOUR PAIN WILL ME MONITORED BY THE SEDATION NURSE DURING YOUR PROCEDURE    THE ABOVE INSTRUCTIONS WERE GIVEN TO THE PATIENT VERBALLY AND THEY VERBALIZED UNDERSTANDING. THEY DO NOT REQUIRE ANY SPECIAL NEEDS AND DO NOT HAVE ANY LEARNING BARRIERS.     Any need to reschedule or cancel procedures, or any questions regarding your procedures should be addressed directly with the Arrhythmia Department Nurses at the following phone number: [phone]

## 2017-03-30 NOTE — PROGRESS NOTES
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 The patient also has a left heel ulcer.   He was in the hospital last week with cellulitis and has a PICC line being treated long term.  His heel ulcer continues to fill in nicely.  He has no pain.  I removed the fifth MT pin last week in the hospital.  He is here for a follow up check of his skin.      PE:  Pins look good.  Skin swelling way down.  No erythema.  Alignment good.      A/P:  I will see him back in clinic for another check in 2 weeks.  I'd like to keep the ex fix on for a while longer if possible.  Ankle x-rays at follow up.

## 2017-03-30 NOTE — TELEPHONE ENCOUNTER
Spoke w/HH nurse, [facility] is presently doing the other wounds to pin site and heels, states they will address this new wound as well. Cleansed with NS, pat dry, and Mepilex dressing is applied.

## 2017-03-30 NOTE — LETTER
March 30, 2017      Yee Villafuerte, FNP  [address]  [facility] [address]           [facility]  [address]  [facility] [address]  Phone: [phone]  Fax: [phone]          Patient: Janusz Montgomery Jr.   MR Number: [id number]   YOB: 1945   Date of Visit: 3/30/2017       Dear Yee Villafuerte:    Thank you for referring Janusz Montgomery to me for evaluation. Attached you will find relevant portions of my assessment and plan of care.    If you have questions, please do not hesitate to call me. I look forward to following Janusz Montgomery along with you.    Sincerely,    Luis Palacio MD    Enclosure  CC:  No Recipients    If you would like to receive this communication electronically, please contact [email or web address] or [phone] to request more information on Shopping Mail Link access.    For providers and/or their staff who would like to refer a patient to [facility], please contact us through our one-stop-shop provider referral line, [facility], at [phone].    If you feel you have received this communication in error or would no longer like to receive these types of communications, please e-mail [email or web address]

## 2017-03-31 ENCOUNTER — OUTPATIENT CASE MANAGEMENT (OUTPATIENT)
Dept: ADMINISTRATIVE | Facility: OTHER | Age: 72
End: 2017-03-31

## 2017-03-31 NOTE — PROGRESS NOTES
Attempted to contact pt for follow up. No answer and unable to leave message. Will try again next week.

## 2017-04-01 PROCEDURE — 99495 TRANSJ CARE MGMT MOD F2F 14D: CPT | Mod: S$GLB,,, | Performed by: NURSE PRACTITIONER

## 2017-04-03 ENCOUNTER — OUTPATIENT CASE MANAGEMENT (OUTPATIENT)
Dept: ADMINISTRATIVE | Facility: OTHER | Age: 72
End: 2017-04-03

## 2017-04-03 ENCOUNTER — LAB VISIT (OUTPATIENT)
Dept: LAB | Facility: HOSPITAL | Age: 72
End: 2017-04-03
Attending: INTERNAL MEDICINE
Payer: MEDICARE

## 2017-04-03 DIAGNOSIS — L89.624 STAGE IV PRESSURE ULCER OF LEFT HEEL: Primary | ICD-10-CM

## 2017-04-03 LAB
ANION GAP SERPL CALC-SCNC: 8 MMOL/L
BASOPHILS # BLD AUTO: 0.08 K/UL
BASOPHILS NFR BLD: 0.9 %
BUN SERPL-MCNC: 21 MG/DL
CALCIUM SERPL-MCNC: 8.7 MG/DL
CHLORIDE SERPL-SCNC: 107 MMOL/L
CO2 SERPL-SCNC: 25 MMOL/L
CREAT SERPL-MCNC: 1.3 MG/DL
CRP SERPL-MCNC: 17.2 MG/L
DIFFERENTIAL METHOD: ABNORMAL
EOSINOPHIL # BLD AUTO: 0.8 K/UL
EOSINOPHIL NFR BLD: 9 %
ERYTHROCYTE [DISTWIDTH] IN BLOOD BY AUTOMATED COUNT: 13.8 %
ERYTHROCYTE [SEDIMENTATION RATE] IN BLOOD BY WESTERGREN METHOD: 61 MM/HR
EST. GFR  (AFRICAN AMERICAN): >60 ML/MIN/1.73 M^2
EST. GFR  (NON AFRICAN AMERICAN): 55 ML/MIN/1.73 M^2
GLUCOSE SERPL-MCNC: 85 MG/DL
HCT VFR BLD AUTO: 29.9 %
HGB BLD-MCNC: 10.1 G/DL
LYMPHOCYTES # BLD AUTO: 2.1 K/UL
LYMPHOCYTES NFR BLD: 22.4 %
MCH RBC QN AUTO: 31 PG
MCHC RBC AUTO-ENTMCNC: 33.8 %
MCV RBC AUTO: 92 FL
MONOCYTES # BLD AUTO: 0.6 K/UL
MONOCYTES NFR BLD: 6.9 %
NEUTROPHILS # BLD AUTO: 5.6 K/UL
NEUTROPHILS NFR BLD: 60.8 %
PLATELET # BLD AUTO: 214 K/UL
PMV BLD AUTO: 10.3 FL
POTASSIUM SERPL-SCNC: 3.9 MMOL/L
RBC # BLD AUTO: 3.26 M/UL
SODIUM SERPL-SCNC: 140 MMOL/L
WBC # BLD AUTO: 9.23 K/UL

## 2017-04-03 PROCEDURE — 80202 ASSAY OF VANCOMYCIN: CPT

## 2017-04-03 PROCEDURE — 85651 RBC SED RATE NONAUTOMATED: CPT

## 2017-04-03 PROCEDURE — 36415 COLL VENOUS BLD VENIPUNCTURE: CPT

## 2017-04-03 PROCEDURE — 80048 BASIC METABOLIC PNL TOTAL CA: CPT

## 2017-04-03 PROCEDURE — 85025 COMPLETE CBC W/AUTO DIFF WBC: CPT

## 2017-04-03 PROCEDURE — 86140 C-REACTIVE PROTEIN: CPT

## 2017-04-03 NOTE — PROGRESS NOTES
Please notify the patient that his holter did not show any atrial fibrillations and we should proceed with ILR implant.

## 2017-04-04 ENCOUNTER — TELEPHONE (OUTPATIENT)
Dept: INFECTIOUS DISEASES | Facility: CLINIC | Age: 72
End: 2017-04-04

## 2017-04-04 LAB — VANCOMYCIN TROUGH SERPL-MCNC: 12 UG/ML

## 2017-04-04 NOTE — TELEPHONE ENCOUNTER
Infectious Disease - Lab follow up  4/3/17    Dx: left foot cellulitis  Antibiotics: 1.5gq24 Vancomycin  Estimated End Date: 4/28/17    WBC - 9.23  Eos%: 9 (trending downward)  H/H - 10.1/29.9  Platelets - 214  Creatinine - 1.3  ESR - 61  CRP - 17.2 (trending downward)  Vancomycin Trough - 12.0    Discussed with Bill at [facility], will recheck vancomycin level on Thursday to ensure accuracy of level. Will follow up.

## 2017-04-05 ENCOUNTER — PATIENT MESSAGE (OUTPATIENT)
Dept: ELECTROPHYSIOLOGY | Facility: CLINIC | Age: 72
End: 2017-04-05

## 2017-04-05 ENCOUNTER — OUTPATIENT CASE MANAGEMENT (OUTPATIENT)
Dept: ADMINISTRATIVE | Facility: OTHER | Age: 72
End: 2017-04-05

## 2017-04-05 ENCOUNTER — TELEPHONE (OUTPATIENT)
Dept: ELECTROPHYSIOLOGY | Facility: CLINIC | Age: 72
End: 2017-04-05

## 2017-04-05 ENCOUNTER — TELEPHONE (OUTPATIENT)
Dept: ADMINISTRATIVE | Facility: CLINIC | Age: 72
End: 2017-04-05

## 2017-04-05 NOTE — TELEPHONE ENCOUNTER
Post-Procedure Patient Discharge Instructions  Loop Recorders    Wound Care   If you are discharged with a standard dressing over the incision, you may remove the dressing after 24 hours.    If there are Steri-strips (strips of tape) over your incision, leave them on until your follow-up appointment. They may begin to fall off on their own, which is normal. If there is Dermabond (clear glue) over your incision, do not scrub it off. It acts as a barrier and will eventually disappear.   You will be discharged with 5 days of oral antibiotics. Please take the full prescription until it is gone.   Do not get the incision wet for 48 hours following the procedure. You may sponge bath during this period, working around the incision. After 48 hours, you may shower, but you should still try to keep this area as dry as possible, and avoid direct water contact to the incision (allow the water to hit back of your shoulder rather than directly on the incision). Gently pat the incision dry if it does get wet.   You may take regular showers after 2 weeks, unless otherwise indicated at your follow-up visit.   Do not submerge the incision in a tub, pool, or body of water for 6 weeks.   If you notice unusual swelling, redness, drainage, have more device site pain, chest pain, shortness of breath, or have a fever greater than 100 degrees, call our device clinic immediately: [phone] or [phone] during normal office hours. You may call [phone] after-hours or on weekends and ask for the electrophysiologist on call.  Activity    If you were driving prior to the procedure, you may resume driving right after your procedure (as long you did not receive any sedation). If you have a history of passing out or a history of certain arrhythmias, there may be driving restrictions unrelated to the procedure. Please clarify with your physician if this is the case.   Avoid rough contact at the device site for 6  weeks.   You may participate in sexual activity unless otherwise instructed.   You may return to work the follow day unless told otherwise by your provider.  Long-Term Instructions   Metal Detectors at Airports: Metal detectors at airports do not interfere with you loop recorder.   MRI: You may have an MRI with an implantable loop recorder. All that is required is that you send a transmission to download your information before and after you have your MRI.  Long-Term Follow-Up   Your device has the ability to transmit device information from home to the doctors office using a home monitoring system.   This remote system takes the place of a doctors visit. Your device will be checked from home every 31 days. Every 12 months, you will be asked to come into the office for an in-office check.   Your device should last in the range of 3 years.

## 2017-04-05 NOTE — TELEPHONE ENCOUNTER
I informed Pt of Holter results per Dr. Palacio, stating that it did not show any Afib and we should proceed with ILR implantation on 4/7/17. I asked the Pt if he had any questions in reference to pre-procedure instructions, however, he states his mail is slow and has not received them. We discussed instructions and he verbalized understanding. I also offered to send them via his Patient portal, which he confirms he uses. He verbalized understanding and denied further questions, needs, and concerns.

## 2017-04-05 NOTE — TELEPHONE ENCOUNTER
----- Message from Luis Palacio MD sent at 4/3/2017 12:56 PM CDT -----  Please notify the patient that his holter did not show any atrial fibrillations and we should proceed with ILR implant.

## 2017-04-06 ENCOUNTER — OFFICE VISIT (OUTPATIENT)
Dept: ORTHOPEDICS | Facility: CLINIC | Age: 72
End: 2017-04-06
Payer: MEDICARE

## 2017-04-06 ENCOUNTER — HOSPITAL ENCOUNTER (OUTPATIENT)
Dept: RADIOLOGY | Facility: HOSPITAL | Age: 72
Discharge: HOME | End: 2017-04-06
Attending: ORTHOPAEDIC SURGERY
Payer: MEDICARE

## 2017-04-06 ENCOUNTER — DOCUMENTATION ONLY (OUTPATIENT)
Dept: INFECTIOUS DISEASES | Facility: CLINIC | Age: 72
End: 2017-04-06

## 2017-04-06 ENCOUNTER — PATIENT MESSAGE (OUTPATIENT)
Dept: ORTHOPEDICS | Facility: CLINIC | Age: 72
End: 2017-04-06

## 2017-04-06 ENCOUNTER — CLINICAL SUPPORT (OUTPATIENT)
Dept: CARDIOLOGY | Facility: CLINIC | Age: 72
End: 2017-04-06
Payer: MEDICARE

## 2017-04-06 VITALS
RESPIRATION RATE: 16 BRPM | WEIGHT: 315 LBS | SYSTOLIC BLOOD PRESSURE: 116 MMHG | BODY MASS INDEX: 41.75 KG/M2 | TEMPERATURE: 97 F | DIASTOLIC BLOOD PRESSURE: 66 MMHG | HEIGHT: 73 IN | HEART RATE: 70 BPM

## 2017-04-06 DIAGNOSIS — Z98.890 STATUS POST SURGERY: ICD-10-CM

## 2017-04-06 DIAGNOSIS — I48.91 ATRIAL FIBRILLATION, UNSPECIFIED TYPE: Primary | ICD-10-CM

## 2017-04-06 DIAGNOSIS — Z98.890 STATUS POST SURGERY: Primary | ICD-10-CM

## 2017-04-06 DIAGNOSIS — R60.9 EDEMA, UNSPECIFIED TYPE: ICD-10-CM

## 2017-04-06 DIAGNOSIS — I48.91 A-FIB: ICD-10-CM

## 2017-04-06 PROCEDURE — 99999 PR PBB SHADOW E&M-EST. PATIENT-LVL V: CPT | Mod: PBBFAC,,, | Performed by: PHYSICIAN ASSISTANT

## 2017-04-06 PROCEDURE — 99024 POSTOP FOLLOW-UP VISIT: CPT | Mod: S$GLB,,, | Performed by: PHYSICIAN ASSISTANT

## 2017-04-06 PROCEDURE — 99499 UNLISTED E&M SERVICE: CPT | Mod: S$GLB,,, | Performed by: PHYSICIAN ASSISTANT

## 2017-04-06 PROCEDURE — 93971 EXTREMITY STUDY: CPT | Mod: S$GLB,,, | Performed by: INTERNAL MEDICINE

## 2017-04-06 RX ORDER — FUROSEMIDE 20 MG/1
20 TABLET ORAL 2 TIMES DAILY
Qty: 10 TABLET | Refills: 0 | Status: SHIPPED | OUTPATIENT
Start: 2017-04-06 | End: 2018-02-07

## 2017-04-06 RX ORDER — SODIUM CHLORIDE 9 MG/ML
INJECTION, SOLUTION INTRAVENOUS ONCE
Status: CANCELLED | OUTPATIENT
Start: 2017-04-06 | End: 2017-04-06

## 2017-04-06 NOTE — PROGRESS NOTES
Infectious Disease - Lab follow up     Dx: Presumed Left foot osteomyelitis  Antibiotics: Vancomycin 1.5g IV q24h  Estimated End Date: 4/28/17    Comment: Vanc slowly accumulating.  No dose adjustment today.  If Vanc trough still subtherapeutic on Monday, will have dose increased.    Mike Parmar PA-C  Pager [phone]      LABS:   Ref. Range 4/6/2017 09:07   Sodium Latest Ref Range: 136 - 145 mmol/L 140   Potassium Latest Ref Range: 3.5 - 5.1 mmol/L 3.9   Chloride Latest Ref Range: 95 - 110 mmol/L 106   CO2 Latest Ref Range: 23 - 29 mmol/L 28   Anion Gap Latest Ref Range: 8 - 16 mmol/L 6 (L)   BUN, Bld Latest Ref Range: 8 - 23 mg/dL 17   Creatinine Latest Ref Range: 0.5 - 1.4 mg/dL 1.3   eGFR if non African American Latest Ref Range: >60 mL/min/1.73 m^2 55 (A)   eGFR if African American Latest Ref Range: >60 mL/min/1.73 m^2 >60   Glucose Latest Ref Range: 70 - 110 mg/dL 88   Calcium Latest Ref Range: 8.7 - 10.5 mg/dL 8.7      Ref. Range 3/27/2017 10:50 3/30/2017 10:15 4/3/2017 09:30 4/6/2017 09:07   Vancomycin-Trough Latest Ref Range: 10.0 - 22.0 ug/mL 11.5 9.7 (L) 12.0 13.2     Vancomycin Trough -

## 2017-04-06 NOTE — TELEPHONE ENCOUNTER
Patient has not been seen by me recently. Recommend we call priority clinic or infectious disease for orders regarding his wound.

## 2017-04-07 ENCOUNTER — HOSPITAL ENCOUNTER (OUTPATIENT)
Facility: HOSPITAL | Age: 72
Discharge: HOME | End: 2017-04-07
Attending: INTERNAL MEDICINE | Admitting: INTERNAL MEDICINE
Payer: MEDICARE

## 2017-04-07 VITALS
WEIGHT: 315 LBS | BODY MASS INDEX: 41.75 KG/M2 | SYSTOLIC BLOOD PRESSURE: 175 MMHG | HEART RATE: 83 BPM | TEMPERATURE: 98 F | OXYGEN SATURATION: 94 % | DIASTOLIC BLOOD PRESSURE: 70 MMHG | HEIGHT: 73 IN | RESPIRATION RATE: 16 BRPM

## 2017-04-07 DIAGNOSIS — I48.91 ATRIAL FIBRILLATION, UNSPECIFIED TYPE: ICD-10-CM

## 2017-04-07 DIAGNOSIS — I48.91 A-FIB: Primary | ICD-10-CM

## 2017-04-07 DIAGNOSIS — I10 ESSENTIAL (PRIMARY) HYPERTENSION: ICD-10-CM

## 2017-04-07 PROCEDURE — 33282 EP LAB PROCEDURE: CPT | Mod: ,,, | Performed by: INTERNAL MEDICINE

## 2017-04-07 PROCEDURE — 25000003 PHARM REV CODE 250: Performed by: INTERNAL MEDICINE

## 2017-04-07 PROCEDURE — 25000003 PHARM REV CODE 250

## 2017-04-07 PROCEDURE — 93010 ELECTROCARDIOGRAM REPORT: CPT | Mod: ,,, | Performed by: INTERNAL MEDICINE

## 2017-04-07 PROCEDURE — 63600175 PHARM REV CODE 636 W HCPCS

## 2017-04-07 PROCEDURE — C1764 EVENT RECORDER, CARDIAC: HCPCS

## 2017-04-07 PROCEDURE — 93005 ELECTROCARDIOGRAM TRACING: CPT

## 2017-04-07 RX ORDER — SODIUM CHLORIDE 9 MG/ML
INJECTION, SOLUTION INTRAVENOUS ONCE
Status: COMPLETED | OUTPATIENT
Start: 2017-04-07 | End: 2017-04-07

## 2017-04-07 RX ADMIN — SODIUM CHLORIDE 1000 ML: 0.9 INJECTION, SOLUTION INTRAVENOUS at 01:04

## 2017-04-07 NOTE — PLAN OF CARE
Problem: Patient Care Overview  Goal: Plan of Care Review  Outcome: Ongoing (interventions implemented as appropriate)  Received report from ANUPAMA Sweet. Pt received from EP lab via stretcher. Patient  AAOx3. VSS, no distress noted. Resp even and unlabored. Chest wall dermabond C/D/I. No active bleeding noted. Post procedure protocol reviewed with patient and patient's family. Understanding verbalized. Family members at bedside. Nurse call bell within reach. Will continue to monitor per post procedure protocol.

## 2017-04-07 NOTE — IP AVS SNAPSHOT
[facility]  [address]  [place] [address]  Phone: [phone]           Patient Discharge Instructions   Our goal is to set you up for success. This packet includes information on your condition, medications, and your home care.  It will help you care for yourself to prevent having to return to the hospital.     Please ask your nurse if you have any questions.      There are many details to remember when preparing to leave the hospital. Here is what you will need to do:    1. Take your medicine. If you are prescribed medications, review your Medication List on the following pages. You may have new medications to  at the pharmacy and others that you'll need to stop taking. Review the instructions for how and when to take your medications. Talk with your doctor or nurses if you are unsure of what to do.     2. Go to your follow-up appointments. Specific follow-up information is listed in the following pages. Your may be contacted by a nurse or clinical provider about future appointments. Be sure we have all of the phone numbers to reach you. Please contact your provider's office if you are unable to make an appointment.     3. Watch for warning signs. Your doctor or nurse will give you detailed warning signs to watch for and when to call for assistance. These instructions may also include educational information about your condition. If you experience any of warning signs to your health, call your doctor.           [facility]  Unless otherwise directed by your provider, please   contact [facility], our nurse care line   that is available for 24/7 assistance.     [phone] (toll-free)     Registered nurses in the [facility]   provide: appointment scheduling, clinical advisement, health education, and other advisory services.                  ** Verify the list of medication(s) below is accurate and up to date. Carry this with you in case of  emergency. If your medications have changed, please notify your healthcare provider.             Medication List      CHANGE how you take these medications        Additional Info                      artificial tears 0.5 % ophthalmic solution   Commonly known as:  ISOPTO TEARS   Refills:  0   Dose:  1 drop   What changed:  reasons to take this    Instructions:  Place 1 drop into both eyes as needed.     Begin Date    AM    Noon    PM    Bedtime       cetirizine 10 MG tablet   Commonly known as:  ZYRTEC   Refills:  0   Dose:  10 mg   What changed:    - when to take this  - reasons to take this    Instructions:  Take 1 tablet (10 mg total) by mouth once daily.     Begin Date    AM    Noon    PM    Bedtime       gabapentin 300 MG capsule   Commonly known as:  NEURONTIN   Quantity:  90 capsule   Refills:  11   Dose:  300 mg   What changed:    - how much to take  - how to take this  - when to take this  - additional instructions    Instructions:  Take 1 capsule (300 mg total) by mouth 3 (three) times daily.     Begin Date    AM    Noon    PM    Bedtime         CONTINUE taking these medications        Additional Info                      acetaminophen 325 MG tablet   Commonly known as:  TYLENOL   Refills:  0   Dose:  325 mg    Instructions:  Take 325 mg by mouth every 6 (six) hours as needed for Pain.     Begin Date    AM    Noon    PM    Bedtime       aspirin 325 MG tablet   Refills:  0   Dose:  325 mg    Instructions:  Take 1 tablet (325 mg total) by mouth once daily.     Begin Date    AM    Noon    PM    Bedtime       atorvastatin 40 MG tablet   Commonly known as:  LIPITOR   Quantity:  90 tablet   Refills:  0    Instructions:  TAKE ONE TABLET BY MOUTH ONCE DAILY     Begin Date    AM    Noon    PM    Bedtime       collagenase ointment   Quantity:  60 g   Refills:  0    Instructions:  Apply topically every Mon, Wed, Fri. Nursing to apply Santyl  nickel thick to wound bed and edges and cover with damp gauze (to activate  santyl) daily and cover with telfa island dressing (mepore) over each pressure injury. Santyl has autolytic debridement properties and will promote healing.     Begin Date    AM    Noon    PM    Bedtime       dextrose 5 % SolP 250 mL with vancomycin 1,000 mg SolR 1,500 mg   Refills:  0   Dose:  1500 mg   Indications:  Bone/Joint    Instructions:  Inject 1,500 mg into the vein once daily. 4/26/17=stop date.     Begin Date    AM    Noon    PM    Bedtime       diclofenac sodium 1 % Gel   Quantity:  1 Tube   Refills:  0   Dose:  2 g    Instructions:  Apply 2 g topically once daily. Apply 2 g to left shoulder     Begin Date    AM    Noon    PM    Bedtime       diphenhydrAMINE 25 mg capsule   Commonly known as:  BENADRYL   Refills:  0   Dose:  25 mg    Instructions:  Take 1 each (25 mg total) by mouth every 6 (six) hours as needed for Itching.     Begin Date    AM    Noon    PM    Bedtime       furosemide 20 MG tablet   Commonly known as:  LASIX   Quantity:  10 tablet   Refills:  0   Dose:  20 mg    Instructions:  Take 1 tablet (20 mg total) by mouth 2 (two) times daily.     Begin Date    AM    Noon    PM    Bedtime       melatonin 3 mg Tab   Refills:  0   Dose:  6 mg    Instructions:  Take 6 mg by mouth nightly as needed (for sleep).     Begin Date    AM    Noon    PM    Bedtime       miconazole 2 % cream   Commonly known as:  MICOTIN   Refills:  0    Instructions:  Apply topically 2 (two) times daily.     Begin Date    AM    Noon    PM    Bedtime       omeprazole 20 MG capsule   Commonly known as:  PRILOSEC   Quantity:  180 capsule   Refills:  0   Dose:  20 mg    Instructions:  Take 1 capsule (20 mg total) by mouth once daily.     Begin Date    AM    Noon    PM    Bedtime       oxycodone-acetaminophen  mg per tablet   Commonly known as:  PERCOCET   Quantity:  60 tablet   Refills:  0   Dose:  1 tablet    Instructions:  Take 1 tablet by mouth every 4 to 6 hours as needed for Pain.     Begin Date    AM    Noon    PM     Bedtime       tamsulosin 0.4 mg Cp24   Commonly known as:  FLOMAX   Quantity:  90 capsule   Refills:  3   Dose:  0.4 mg    Instructions:  Take 1 capsule (0.4 mg total) by mouth once daily.     Begin Date    AM    Noon    PM    Bedtime       venlafaxine 75 MG tablet   Commonly known as:  EFFEXOR   Quantity:  360 tablet   Refills:  0   Dose:  150 mg    Instructions:  Take 2 tablets (150 mg total) by mouth 2 (two) times daily.     Begin Date    AM    Noon    PM    Bedtime       VITAMIN B-12 ORAL   Refills:  0   Dose:  2500 mcg    Instructions:  Take 2,500 mcg by mouth once daily.     Begin Date    AM    Noon    PM    Bedtime       vitamin D 1000 units Tab   Refills:  0   Dose:  1000 Units    Instructions:  Take 1 tablet (1,000 Units total) by mouth once daily.     Begin Date    AM    Noon    PM    Bedtime       zinc sulfate 220 (50) mg capsule   Commonly known as:  ZINCATE   Refills:  0   Dose:  220 mg    Instructions:  Take 1 capsule (220 mg total) by mouth once daily.     Begin Date    AM    Noon    PM    Bedtime                  Please bring to all follow up appointments:    1. A copy of your discharge instructions.  2. All medicines you are currently taking in their original bottles.  3. Identification and insurance card.    Please arrive 15 minutes ahead of scheduled appointment time.    Please call 24 hours in advance if you must reschedule your appointment and/or time.        Your Scheduled Appointments     Apr 11, 2017 10:15 AM CDT   Established Patient Visit with MD Reji Maravilla - Orthopedics ([facility] )    [address]  [place] [address]   [phone]            Apr 11, 2017  2:00 PM CDT   Established Patient Visit with JIMMY Meléndez, SARAH [address] - Infectious Diseases ([facility] )    [address]  [place] [address]   [phone]            Apr 12, 2017  2:40 PM CDT   Consult with MD Reji Schaeffer - Nephrology  ([facility] )    [address]  [facility] [address]   [phone]            Apr 19, 2017 10:00 AM CDT   Consult with MD Reji Carmen - Cardiology ([facility] )    [address]  [facility] [address]   [phone]            Apr 24, 2017  8:00 AM CDT   Fasting Lab with LAB, [facility]   [facility]    [address] [facility] [address] [phone]              Follow-up Information     Follow up with Reji Mejias - Arrhythmia In 1 week.    Specialty:  Cardiology    Why:  For wound re-check    Contact information:    [address]  [place] [id number] [phone]    Additional information:    [facility] - 3rd Floor        Follow up with Luis Palacio MD In 3 months.    Specialties:  Cardiology, Electrophysiology    Contact information:    [address]  [facility] [address]  [phone]          Discharge Instructions     Future Orders    Activity as tolerated     Call MD for:  difficulty breathing or increased cough     Call MD for:  increased confusion or weakness     Call MD for:  persistent dizziness, light-headedness, or visual disturbances     Call MD for:  persistent nausea and vomiting or diarrhea     Call MD for:  redness, tenderness, or signs of infection (pain, swelling, redness, odor or green/yellow discharge around incision site)     Call MD for:  severe persistent headache     Call MD for:  severe uncontrolled pain     Call MD for:  temperature >100.4     Call MD for:  worsening rash     Diet general     Questions:    Total calories:      Fat restriction, if any:      Protein restriction, if any:      Na restriction, if any:      Fluid restriction:      Additional restrictions:  Cardiac (Low Na/Chol)        Primary Diagnosis     Your primary diagnosis was:  Atrial Fibrillation (Irregular Heartbeat)      Admission Information     Date & Time Provider Department CSN    4/7/2017  "12:40 PM Luis Palacio MD [facility] [id number]      Care Providers     Provider Role Specialty Primary office phone    Luis Palacio MD Attending Provider Cardiology [phone]      Your Vitals Were     BP Pulse Temp Resp Height Weight    175/70 (BP Location: Right arm, Patient Position: Sitting, BP Method: Automatic) 83 98.1 °F (36.7 °C) (Oral) 16 6' 1" (1.854 m) 145.1 kg (320 lb)    SpO2 BMI             94% 42.22 kg/m2         Recent Lab Values        7/13/2010 3/18/2011 2/10/2012 7/27/2012 11/18/2013 3/3/2014 3/31/2015 7/11/2016      8:35 AM  8:54 AM  8:56 AM  9:25 AM  3:45 PM 10:52 AM 10:35 AM 11:29 AM    A1C 5.3 5.6 5.5 5.4 5.5 5.6 5.3 5.3    Comment for A1C at 11:29 AM on 7/11/2016:  According to ADA guidelines, hemoglobin A1C <7.0% represents  optimal control in non-pregnant diabetic patients.  Different  metrics may apply to specific populations.   Standards of Medical Care in Diabetes - 2016.  For the purpose of screening for the presence of diabetes:  <5.7%     Consistent with the absence of diabetes  5.7-6.4%  Consistent with increasing risk for diabetes   (prediabetes)  >or=6.5%  Consistent with diabetes  Currently no consensus exists for use of hemoglobin A1C  for diagnosis of diabetes for children.        Allergies as of 4/7/2017        Reactions    Ciprofloxacin Rash    Diffuse pruritic morbilliform rash developed 3/15/2017 after dose of cipro; previously in 2/2017 he had rash/fevers after initiation of cipro    Zosyn [Piperacillin-tazobactam] Anaphylaxis    Diffuse pruritic morbilliform rash developed 3/15/2017.  Then, 430am dose on 3/16 and rash worsened with SOB/tachypnea but no hypoxemia.     Bacitracin Itching, Rash    Violaceous rash in area of topical Tx.       Advance Directives     An advance directive is a document which, in the event you are no longer able to make decisions for yourself, tells your healthcare team what kind of treatment you do or do not want to " receive, or who you would like to make those decisions for you.  If you do not currently have an advance directive, [facility] encourages you to create one.  For more information call:  [phone]), [phone] ([phone]),  or log on to [email or web address].        Language Assistance Services     ATTENTION: Language assistance services are available, free of charge. Please call [phone].      ATENCIÓN: Si habla español, tiene a robison disposición servicios gratuitos de asistencia lingüística. Llame al [phone].     CHÚ Ý: N?u b?n nói Ti?ng Vi?t, có các d?ch v? h? tr? ngôn ng? mi?n phí dành cho b?n. G?i s? [phone].         [facility] complies with applicable Federal civil rights laws and does not discriminate on the basis of race, color, national origin, age, disability, or sex.

## 2017-04-07 NOTE — PROGRESS NOTES
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 The patient also has a left heel ulcer.    Patient presents to clinic with chief complaint of increased foot pain and swelling ( achy throbbing pressure). Patient stated that he was taking 1 percocet 10 3 times daily and has had to increase intake due to pain. He stated that the Percocet 10 is not lasting 4 hours at this time. He reports having a hard time finding a comfortable position to lay down in and that the pain is disrupting his sleep. He stated that he has elevated the foot above his heart and has not been able to decrease the swelling. He denied calf pain. He does have history of Afib not currently being treated with anticoagulation therapy due to injury. Patient Has PICC line in left arm. He stated that he has been taking antibiotics as prescribed. He denied fever chills night sweats, erythema increased warmth, foul smell.   He stated that the thing that helped the most with the swelling was a dosage of lasix in the hospital.     PE:  Pins look good, clean.  Moderate to severe swelling from ankle crease to toes, non-pitting, skin appears dry and flaky, no color changes no breaking of the skin/ new wounds, mild TTP.  No erythema increased warmth, drainage from pin sites.  Alignment good.  No TTP to calf    A/P:    Discussed plan with .   At this time we will prescribe 20 mg of lasix 1 po bid prn swelling. Patient is to monitor his blood pressure during this time and discontinue if low or symptomatic.  He will also elevate to reduce swelling.   Order placed for ultrasound to rule out DVT due to past medical history.   return to clinic on Tuesday for skin check. At time x-ray of his ankle is needed  Discussed with patient if over the weekend symptoms increased he is to go to the ED, he and his wife voiced understanding.

## 2017-04-07 NOTE — PLAN OF CARE
Pt verbalized an understanding of discharge instructions including diet, s/s to notify md, post procedure care, and follow up. Chest mepilex dressing clean, dry and intact. No active bleeding, no hematoma. Pt left unit via wheelchair escorted by nurse with family by side.
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
## 2017-04-07 NOTE — INTERVAL H&P NOTE
The patient has been examined and the H&P has been reviewed:    I concur with the findings and no changes have occurred since H&P was written.         Prior to procedure, we discussed the alternatives, benefits and risks of the procedure including pain, infection, bleeding, death.The patient voices understanding and all questions have been answered. No further questions/concerns voiced at this time.     ENDY Elizondo-BC  Cardiology Electrophysiology  NP   [facility]    Attending: MD Luis Palacio             Active Hospital Problems    Diagnosis  POA    A-fib [I48.91]  Yes      Resolved Hospital Problems    Diagnosis Date Resolved POA   No resolved problems to display.

## 2017-04-08 NOTE — DISCHARGE SUMMARY
[facility]  Discharge Note  Short Stay    Admit Date: 4/7/2017    Discharge Date and Time: 4/7/2017  6:34 PM     Attending Physician: Luis Palacio     Discharge Provider: Luis Palacio    Diagnoses:  Active Hospital Problems    Diagnosis  POA    *A-fib [I48.91]  Yes      Resolved Hospital Problems    Diagnosis Date Resolved POA   No resolved problems to display.       Discharged Condition: good    Hospital Course: Patient was admitted for an outpatient procedure and tolerated the procedure well with no complications.    Final Diagnoses: Same as principal problem.    Disposition: Home or Self Care    Follow up/Patient Instructions:    Medications:  Reconciled Home Medications:   Discharge Medication List as of 4/7/2017  5:50 PM      CONTINUE these medications which have NOT CHANGED    Details   artificial tears (ISOPTO TEARS) 0.5 % ophthalmic solution Place 1 drop into both eyes as needed., Starting 1/23/2017, Until Discontinued, OTC      aspirin 325 MG tablet Take 1 tablet (325 mg total) by mouth once daily., Starting 3/20/2017, Until Tue 3/20/18, OTC      atorvastatin (LIPITOR) 40 MG tablet TAKE ONE TABLET BY MOUTH ONCE DAILY, Normal      collagenase ointment Apply topically every Mon, Wed, Fri. Nursing to apply Santyl  nickel thick to wound bed and edges and cover with damp gauze (to activate santyl) daily and cover with telfa island dressing (mepore) over each pressure injury. Santyl has autolytic debrideme nt properties and will promote healing., Starting 1/23/2017, Until Discontinued, Print      CYANOCOBALAMIN, VITAMIN B-12, (VITAMIN B-12 ORAL) Take 2,500 mcg by mouth once daily., Until Discontinued, Historical Med      diclofenac sodium 1 % Gel Apply 2 g topically once daily. Apply 2 g to left shoulder, Starting 1/23/2017, Until Discontinued, Normal      furosemide (LASIX) 20 MG tablet Take 1 tablet (20 mg total) by mouth 2 (two) times daily., Starting 4/6/2017, Until Fri 4/6/18, Print       gabapentin (NEURONTIN) 300 MG capsule Take 1 capsule (300 mg total) by mouth 3 (three) times daily., Starting 12/16/2016, Until Sat 12/16/17, Normal      melatonin 3 mg Tab Take 6 mg by mouth nightly as needed (for sleep). , Until Discontinued, Historical Med      miconazole (MICOTIN) 2 % cream Apply topically 2 (two) times daily., Starting 3/20/2017, Until Discontinued, OTC      omeprazole (PRILOSEC) 20 MG capsule Take 1 capsule (20 mg total) by mouth once daily., Starting 3/28/2017, Until Discontinued, No Print      oxycodone-acetaminophen (PERCOCET)  mg per tablet Take 1 tablet by mouth every 4 to 6 hours as needed for Pain., Starting 3/13/2017, Until Discontinued, Print      tamsulosin (FLOMAX) 0.4 mg Cp24 Take 1 capsule (0.4 mg total) by mouth once daily., Starting 12/15/2016, Until Discontinued, Normal      venlafaxine (EFFEXOR) 75 MG tablet Take 2 tablets (150 mg total) by mouth 2 (two) times daily., Starting 4/4/2016, Until Discontinued, Normal      vitamin D 1000 units Tab Take 1 tablet (1,000 Units total) by mouth once daily., Starting 8/5/2016, Until Discontinued, OTC      zinc sulfate (ZINCATE) 220 (50) mg capsule Take 1 capsule (220 mg total) by mouth once daily., Starting 1/23/2017, Until Discontinued, OTC      acetaminophen (TYLENOL) 325 MG tablet Take 325 mg by mouth every 6 (six) hours as needed for Pain., Until Discontinued, Historical Med      cetirizine (ZYRTEC) 10 MG tablet Take 1 tablet (10 mg total) by mouth once daily., Starting 3/20/2017, Until Tue 3/20/18, OTC      dextrose 5 % SolP 250 mL with vancomycin 1,000 mg SolR 1,500 mg Inject 1,500 mg into the vein once daily. 4/26/17=stop date., Starting 3/23/2017, Until Discontinued, No Print      diphenhydrAMINE (BENADRYL) 25 mg capsule Take 1 each (25 mg total) by mouth every 6 (six) hours as needed for Itching., Starting 3/20/2017, Until Discontinued, OTC             Discharge Procedure Orders  Diet general   Order Specific Question  Answer Comments   Additional restrictions: Cardiac (Low Na/Chol)      Activity as tolerated     Call MD for:  temperature >100.4     Call MD for:  persistent nausea and vomiting or diarrhea     Call MD for:  severe uncontrolled pain     Call MD for:  redness, tenderness, or signs of infection (pain, swelling, redness, odor or green/yellow discharge around incision site)     Call MD for:  severe persistent headache     Call MD for:  difficulty breathing or increased cough     Call MD for:  increased confusion or weakness     Call MD for:  persistent dizziness, light-headedness, or visual disturbances     Call MD for:  worsening rash     Remove dressing in 24 hours       Follow-up Information     Follow up with Reji Echols In 1 week.    Specialty:  Cardiology    Why:  For wound re-check    Contact information:    [facility]  [facility] [address] [phone]    Additional information:    [facility] [place] - 3rd Floor        Follow up with Luis Palacio MD In 3 months.    Specialties:  Cardiology, Electrophysiology    Contact information:    [address]  [facility] [id number] [phone]            Discharge Procedure Orders (must include Diet, Follow-up, Activity):    Discharge Procedure Orders (must include Diet, Follow-up, Activity)  Diet general   Order Specific Question Answer Comments   Additional restrictions: Cardiac (Low Na/Chol)      Activity as tolerated     Call MD for:  temperature >100.4     Call MD for:  persistent nausea and vomiting or diarrhea     Call MD for:  severe uncontrolled pain     Call MD for:  redness, tenderness, or signs of infection (pain, swelling, redness, odor or green/yellow discharge around incision site)     Call MD for:  severe persistent headache     Call MD for:  difficulty breathing or increased cough     Call MD for:  increased confusion or weakness     Call MD for:  persistent dizziness, light-headedness, or visual disturbances     Call MD  for:  worsening rash     Remove dressing in 24 hours

## 2017-04-10 DIAGNOSIS — I48.0 PAF (PAROXYSMAL ATRIAL FIBRILLATION): ICD-10-CM

## 2017-04-10 DIAGNOSIS — Z95.818 STATUS POST PLACEMENT OF IMPLANTABLE LOOP RECORDER: Primary | ICD-10-CM

## 2017-04-10 RX ORDER — RAMIPRIL 5 MG/1
CAPSULE ORAL
Qty: 90 CAPSULE | Refills: 0 | Status: SHIPPED | OUTPATIENT
Start: 2017-04-10 | End: 2017-06-09 | Stop reason: SDUPTHER

## 2017-04-11 ENCOUNTER — OFFICE VISIT (OUTPATIENT)
Dept: ORTHOPEDICS | Facility: CLINIC | Age: 72
End: 2017-04-11
Payer: MEDICARE

## 2017-04-11 ENCOUNTER — HOSPITAL ENCOUNTER (OUTPATIENT)
Dept: RADIOLOGY | Facility: HOSPITAL | Age: 72
Discharge: HOME | End: 2017-04-11
Attending: ORTHOPAEDIC SURGERY
Payer: MEDICARE

## 2017-04-11 ENCOUNTER — OFFICE VISIT (OUTPATIENT)
Dept: INFECTIOUS DISEASES | Facility: CLINIC | Age: 72
End: 2017-04-11
Payer: MEDICARE

## 2017-04-11 VITALS
TEMPERATURE: 98 F | HEART RATE: 74 BPM | BODY MASS INDEX: 41.75 KG/M2 | SYSTOLIC BLOOD PRESSURE: 128 MMHG | DIASTOLIC BLOOD PRESSURE: 71 MMHG | HEIGHT: 73 IN | WEIGHT: 315 LBS

## 2017-04-11 VITALS
SYSTOLIC BLOOD PRESSURE: 113 MMHG | HEIGHT: 73 IN | TEMPERATURE: 99 F | HEART RATE: 81 BPM | DIASTOLIC BLOOD PRESSURE: 53 MMHG

## 2017-04-11 DIAGNOSIS — S82.402N TRAUMATIC TYPE III OPEN FRACTURE OF SHAFT OF LEFT TIBIA AND FIBULA WITH NONUNION: ICD-10-CM

## 2017-04-11 DIAGNOSIS — S82.202N TRAUMATIC TYPE III OPEN FRACTURE OF SHAFT OF LEFT TIBIA AND FIBULA WITH NONUNION: Primary | ICD-10-CM

## 2017-04-11 DIAGNOSIS — S82.202N TRAUMATIC TYPE III OPEN FRACTURE OF SHAFT OF LEFT TIBIA AND FIBULA WITH NONUNION: ICD-10-CM

## 2017-04-11 DIAGNOSIS — L03.116 CELLULITIS OF LEFT LOWER EXTREMITY: ICD-10-CM

## 2017-04-11 DIAGNOSIS — S82.402N TRAUMATIC TYPE III OPEN FRACTURE OF SHAFT OF LEFT TIBIA AND FIBULA WITH NONUNION: Primary | ICD-10-CM

## 2017-04-11 DIAGNOSIS — L97.529 NON-PRESSURE CHRONIC ULCER OF OTHER PART OF LEFT FOOT WITH UNSPECIFIED SEVERITY: Primary | ICD-10-CM

## 2017-04-11 PROCEDURE — 99212 OFFICE O/P EST SF 10 MIN: CPT | Mod: S$GLB,,, | Performed by: NURSE PRACTITIONER

## 2017-04-11 PROCEDURE — 99214 OFFICE O/P EST MOD 30 MIN: CPT | Mod: S$GLB,,, | Performed by: ORTHOPAEDIC SURGERY

## 2017-04-11 PROCEDURE — 1159F MED LIST DOCD IN RCRD: CPT | Mod: S$GLB,,, | Performed by: ORTHOPAEDIC SURGERY

## 2017-04-11 PROCEDURE — 1157F ADVNC CARE PLAN IN RCRD: CPT | Mod: S$GLB,,, | Performed by: NURSE PRACTITIONER

## 2017-04-11 PROCEDURE — 73610 X-RAY EXAM OF ANKLE: CPT | Mod: 26,LT,, | Performed by: RADIOLOGY

## 2017-04-11 PROCEDURE — 99999 PR PBB SHADOW E&M-EST. PATIENT-LVL IV: CPT | Mod: PBBFAC,,, | Performed by: ORTHOPAEDIC SURGERY

## 2017-04-11 PROCEDURE — 1125F AMNT PAIN NOTED PAIN PRSNT: CPT | Mod: S$GLB,,, | Performed by: ORTHOPAEDIC SURGERY

## 2017-04-11 PROCEDURE — 1125F AMNT PAIN NOTED PAIN PRSNT: CPT | Mod: S$GLB,,, | Performed by: NURSE PRACTITIONER

## 2017-04-11 PROCEDURE — 3074F SYST BP LT 130 MM HG: CPT | Mod: S$GLB,,, | Performed by: NURSE PRACTITIONER

## 2017-04-11 PROCEDURE — 99999 PR PBB SHADOW E&M-EST. PATIENT-LVL V: CPT | Mod: PBBFAC,,, | Performed by: NURSE PRACTITIONER

## 2017-04-11 PROCEDURE — 99499 UNLISTED E&M SERVICE: CPT | Mod: S$GLB,,, | Performed by: NURSE PRACTITIONER

## 2017-04-11 PROCEDURE — 1160F RVW MEDS BY RX/DR IN RCRD: CPT | Mod: S$GLB,,, | Performed by: ORTHOPAEDIC SURGERY

## 2017-04-11 PROCEDURE — 1159F MED LIST DOCD IN RCRD: CPT | Mod: S$GLB,,, | Performed by: NURSE PRACTITIONER

## 2017-04-11 PROCEDURE — 3074F SYST BP LT 130 MM HG: CPT | Mod: S$GLB,,, | Performed by: ORTHOPAEDIC SURGERY

## 2017-04-11 PROCEDURE — 1157F ADVNC CARE PLAN IN RCRD: CPT | Mod: S$GLB,,, | Performed by: ORTHOPAEDIC SURGERY

## 2017-04-11 PROCEDURE — 3078F DIAST BP <80 MM HG: CPT | Mod: S$GLB,,, | Performed by: NURSE PRACTITIONER

## 2017-04-11 PROCEDURE — 1160F RVW MEDS BY RX/DR IN RCRD: CPT | Mod: S$GLB,,, | Performed by: NURSE PRACTITIONER

## 2017-04-11 PROCEDURE — 99499 UNLISTED E&M SERVICE: CPT | Mod: S$GLB,,, | Performed by: ORTHOPAEDIC SURGERY

## 2017-04-11 PROCEDURE — 3078F DIAST BP <80 MM HG: CPT | Mod: S$GLB,,, | Performed by: ORTHOPAEDIC SURGERY

## 2017-04-11 RX ORDER — MUPIROCIN 20 MG/G
1 OINTMENT TOPICAL
Status: CANCELLED | OUTPATIENT
Start: 2017-04-11
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
## 2017-04-11 NOTE — MR AVS SNAPSHOT
[facility] Infectious Diseases  [address]  [place] [address]  Phone: [phone]  Fax: [phone]                  Janusz Montgomery Jr.   2017 2:00 PM   Office Visit    Description:  Male : 1945   Provider:  JIMMY Meléndez, ANP   Department:  [facility] - Infectious Diseases           Reason for Visit     Follow-up                To Do List           Future Appointments        Provider Department Dept Phone    2017 2:40 PM Jessica Herndon MD [facility] Nephrology [phone]    2017 10:00 AM Navdepe Guardado MD [facility] Cardiology [phone]    2017 1:40 PM PACEMAKER, ICD [facility] Arrhythmia [phone]    2017 8:00 AM LAB, LAPALCO [facility] [phone]    2017 2:00 PM Miguelina Weathers MD [facility] Medicine [phone]      Your Future Surgeries/Procedures     2017   Surgery with Walter Cortés MD   [facility]    [address] [facility] [id number] [phone]              Goals (5 Years of Data)     Patient/caregiver will have an action plan in place to manage and prevent complications of  infectio/wound prior to discharge from [facility].     Notes - Note created  3/24/2017  3:11 PM by Delfina Sykes RN    Overall Time to Completion  2 months from 2017    Short Term Goals  Patient/caregiver will discuss health care needs with Physician and care team during visits or using Patient Portal.  Interventions   · Empower patient/caregiver to discuss treatment plan with Physician/care team.   Status  · Met    Patient/caregiver will maintain follow-up appointment with Physician within 1 week.  Interventions   · Encourage compliance with Physician follow-ups.   Status  · Partially Met    Patient/caregiver will verbalize 3 signs and symptoms of Infectious Disease within 2 weeks.   Interventions   · Recognize and provide educational material  (SUKUMAR).   Status  · Partially met    Patient/caregiver will verbalize 3 ways of preventing complications due to disease process within 2 weeks.  Interventions   · Discuss appropriate use of Home health with patient/caregiver.  · Recognize and provide educational material (SUKUMAR).   Status  · Partially met          Patient/caregiver will have knowledge of resources available in order to obtain the services that are needed prior to discharge from [facility].     Notes - Note edited  3/24/2017  3:11 PM by Delfina Sykes RN    Overall Time to Completion  1 month from 03/17/2017    Short Term Goals  Patient/caregiver will verbalize understanding and will follow hospital discharge plan 2 weeks.  Interventions   · Collaborate with Inpatient case management regarding Discharge Plan.   Status  · Partially met  Met          [facility]     [facility] On Call Nurse Care Line - 24/7 Assistance  Unless otherwise directed by your provider, please contact [facility], our nurse care line that is available for 24/7 assistance.     Registered nurses in the [facility] On Call Center provide: appointment scheduling, clinical advisement, health education, and other advisory services.  Call: [phone] (toll free)               Medications           Message regarding Medications     Verify the changes and/or additions to your medication regime listed below are the same as discussed with your clinician today.  If any of these changes or additions are incorrect, please notify your healthcare provider.             Verify that the below list of medications is an accurate representation of the medications you are currently taking.  If none reported, the list may be blank. If incorrect, please contact your healthcare provider. Carry this list with you in case of emergency.           Current Medications     acetaminophen (TYLENOL) 325 MG tablet Take 325 mg by mouth every 6 (six) hours as needed for Pain.    artificial tears (ISOPTO TEARS)  0.5 % ophthalmic solution Place 1 drop into both eyes as needed.    aspirin 325 MG tablet Take 1 tablet (325 mg total) by mouth once daily.    atorvastatin (LIPITOR) 40 MG tablet TAKE ONE TABLET BY MOUTH ONCE DAILY    cetirizine (ZYRTEC) 10 MG tablet Take 1 tablet (10 mg total) by mouth once daily.    collagenase ointment Apply topically every Mon, Wed, Fri. Nursing to apply Santyl  nickel thick to wound bed and edges and cover with damp gauze (to activate santyl) daily and cover with telfa island dressing (mepore) over each pressure injury. Santyl has autolytic debridement properties and will promote healing.    CYANOCOBALAMIN, VITAMIN B-12, (VITAMIN B-12 ORAL) Take 2,500 mcg by mouth once daily.    dextrose 5 % SolP 250 mL with vancomycin 1,000 mg SolR 1,500 mg Inject 1,500 mg into the vein once daily. 4/26/17=stop date.    diclofenac sodium 1 % Gel Apply 2 g topically once daily. Apply 2 g to left shoulder    diphenhydrAMINE (BENADRYL) 25 mg capsule Take 1 each (25 mg total) by mouth every 6 (six) hours as needed for Itching.    gabapentin (NEURONTIN) 300 MG capsule Take 1 capsule (300 mg total) by mouth 3 (three) times daily.    melatonin 3 mg Tab Take 6 mg by mouth nightly as needed (for sleep).     miconazole (MICOTIN) 2 % cream Apply topically 2 (two) times daily.    omeprazole (PRILOSEC) 20 MG capsule Take 1 capsule (20 mg total) by mouth once daily.    oxycodone-acetaminophen (PERCOCET)  mg per tablet Take 1 tablet by mouth every 4 to 6 hours as needed for Pain.    ramipril (ALTACE) 5 MG capsule TAKE ONE CAPSULE BY MOUTH ONCE DAILY    tamsulosin (FLOMAX) 0.4 mg Cp24 Take 1 capsule (0.4 mg total) by mouth once daily.    venlafaxine (EFFEXOR) 75 MG tablet Take 2 tablets (150 mg total) by mouth 2 (two) times daily.    vitamin D 1000 units Tab Take 1 tablet (1,000 Units total) by mouth once daily.    zinc sulfate (ZINCATE) 220 (50) mg capsule Take 1 capsule (220 mg total) by mouth once daily.     "furosemide (LASIX) 20 MG tablet Take 1 tablet (20 mg total) by mouth 2 (two) times daily.           Clinical Reference Information           Your Vitals Were     BP Pulse Temp Height Weight BMI    128/71 (BP Location: Right arm, Patient Position: Sitting, BP Method: Automatic) 74 97.7 °F (36.5 °C) (Oral) 6' 1" (1.854 m) 145.2 kg (320 lb) 42.22 kg/m2      Blood Pressure          Most Recent Value    BP  128/71      Allergies as of 4/11/2017     Ciprofloxacin    Zosyn [Piperacillin-tazobactam]    Bacitracin      Immunizations Administered on Date of Encounter - 4/11/2017     None      Instructions    1.  Continue current IV vancomycin.   2.  Continue weekly labs  3.  Call or seek immediate medical attention for any fevers, chills, sweats, diarrhea, n/v or increase in pain, swelling, drainage from wound.   4.  Follow up        Language Assistance Services     ATTENTION: Language assistance services are available, free of charge. Please call [phone].      ATENCIÓN: Si brysonla michael, tiene a robison disposición servicios gratuitos de asistencia lingüística. Llame al [phone].     ANGIE Ý: N?u b?n nói Ti?ng Vi?t, có các d?ch v? h? tr? ngôn ng? mi?n phí dành cho b?n. G?i s? [phone].         [facility] Infect[facility] complies with applicable Federal civil rights laws and does not discriminate on the basis of race, color, national origin, age, disability, or sex.        "

## 2017-04-11 NOTE — PATIENT INSTRUCTIONS
1.  Continue current IV vancomycin.   2.  Continue weekly labs  3.  Call or seek immediate medical attention for any fevers, chills, sweats, diarrhea, n/v or increase in pain, swelling, drainage from wound.   4.  Follow up prior to end of therapy.

## 2017-04-12 ENCOUNTER — LAB VISIT (OUTPATIENT)
Dept: LAB | Facility: HOSPITAL | Age: 72
End: 2017-04-12
Attending: PHYSICIAN ASSISTANT
Payer: MEDICARE

## 2017-04-12 ENCOUNTER — ANESTHESIA EVENT (OUTPATIENT)
Dept: SURGERY | Facility: HOSPITAL | Age: 72
End: 2017-04-12
Payer: MEDICARE

## 2017-04-12 ENCOUNTER — TELEPHONE (OUTPATIENT)
Dept: ORTHOPEDICS | Facility: CLINIC | Age: 72
End: 2017-04-12

## 2017-04-12 DIAGNOSIS — T84.7XXA INFECTION/INFLAMMATION-INTERNAL ORTHO DEVICE: Primary | ICD-10-CM

## 2017-04-12 LAB
ANION GAP SERPL CALC-SCNC: 10 MMOL/L
BUN SERPL-MCNC: 18 MG/DL
CALCIUM SERPL-MCNC: 9.2 MG/DL
CHLORIDE SERPL-SCNC: 104 MMOL/L
CO2 SERPL-SCNC: 27 MMOL/L
CREAT SERPL-MCNC: 1.3 MG/DL
EST. GFR  (AFRICAN AMERICAN): >60 ML/MIN/1.73 M^2
EST. GFR  (NON AFRICAN AMERICAN): 55 ML/MIN/1.73 M^2
GLUCOSE SERPL-MCNC: 99 MG/DL
POTASSIUM SERPL-SCNC: 3.8 MMOL/L
SODIUM SERPL-SCNC: 141 MMOL/L
VANCOMYCIN TROUGH SERPL-MCNC: 13.4 UG/ML

## 2017-04-12 PROCEDURE — 36415 COLL VENOUS BLD VENIPUNCTURE: CPT

## 2017-04-12 PROCEDURE — 80048 BASIC METABOLIC PNL TOTAL CA: CPT

## 2017-04-12 PROCEDURE — 80202 ASSAY OF VANCOMYCIN: CPT

## 2017-04-12 NOTE — TELEPHONE ENCOUNTER
Spoke with pt's wife, Bri.   Advised that pt should be NPO after midnight and arrive no later than 730 am tomorrow at [facility].   Bri verbalized understanding.

## 2017-04-13 ENCOUNTER — HOSPITAL ENCOUNTER (OUTPATIENT)
Facility: HOSPITAL | Age: 72
Discharge: HOME | End: 2017-04-14
Attending: ORTHOPAEDIC SURGERY | Admitting: ORTHOPAEDIC SURGERY
Payer: MEDICARE

## 2017-04-13 ENCOUNTER — ANESTHESIA (OUTPATIENT)
Dept: SURGERY | Facility: HOSPITAL | Age: 72
End: 2017-04-13
Payer: MEDICARE

## 2017-04-13 ENCOUNTER — OUTPATIENT CASE MANAGEMENT (OUTPATIENT)
Dept: ADMINISTRATIVE | Facility: OTHER | Age: 72
End: 2017-04-13

## 2017-04-13 DIAGNOSIS — S82.202N TRAUMATIC TYPE III OPEN FRACTURE OF SHAFT OF LEFT TIBIA AND FIBULA WITH NONUNION: Primary | ICD-10-CM

## 2017-04-13 DIAGNOSIS — S82.402N TRAUMATIC TYPE III OPEN FRACTURE OF SHAFT OF LEFT TIBIA AND FIBULA WITH NONUNION: Primary | ICD-10-CM

## 2017-04-13 DIAGNOSIS — L03.116 CELLULITIS OF LEFT LOWER EXTREMITY: ICD-10-CM

## 2017-04-13 LAB
ANION GAP SERPL CALC-SCNC: 9 MMOL/L
BUN SERPL-MCNC: 20 MG/DL
CALCIUM SERPL-MCNC: 8.6 MG/DL
CHLORIDE SERPL-SCNC: 105 MMOL/L
CO2 SERPL-SCNC: 26 MMOL/L
CREAT SERPL-MCNC: 1.5 MG/DL
EST. GFR  (AFRICAN AMERICAN): 53.8 ML/MIN/1.73 M^2
EST. GFR  (NON AFRICAN AMERICAN): 46.5 ML/MIN/1.73 M^2
GLUCOSE SERPL-MCNC: 116 MG/DL
HCT VFR BLD AUTO: 28.4 %
HGB BLD-MCNC: 9.9 G/DL
POTASSIUM SERPL-SCNC: 4.1 MMOL/L
SODIUM SERPL-SCNC: 140 MMOL/L

## 2017-04-13 PROCEDURE — 25000003 PHARM REV CODE 250: Performed by: ORTHOPAEDIC SURGERY

## 2017-04-13 PROCEDURE — D9220A PRA ANESTHESIA: Mod: ANES,,, | Performed by: ANESTHESIOLOGY

## 2017-04-13 PROCEDURE — C1713 ANCHOR/SCREW BN/BN,TIS/BN: HCPCS | Performed by: ORTHOPAEDIC SURGERY

## 2017-04-13 PROCEDURE — 63600175 PHARM REV CODE 636 W HCPCS: Performed by: ORTHOPAEDIC SURGERY

## 2017-04-13 PROCEDURE — 27100025 HC TUBING, SET FLUID WARMER: Performed by: NURSE ANESTHETIST, CERTIFIED REGISTERED

## 2017-04-13 PROCEDURE — 36415 COLL VENOUS BLD VENIPUNCTURE: CPT

## 2017-04-13 PROCEDURE — 63600175 PHARM REV CODE 636 W HCPCS: Performed by: ANESTHESIOLOGY

## 2017-04-13 PROCEDURE — 11000001 HC ACUTE MED/SURG PRIVATE ROOM

## 2017-04-13 PROCEDURE — 37000009 HC ANESTHESIA EA ADD 15 MINS: Performed by: ORTHOPAEDIC SURGERY

## 2017-04-13 PROCEDURE — 80048 BASIC METABOLIC PNL TOTAL CA: CPT

## 2017-04-13 PROCEDURE — 85014 HEMATOCRIT: CPT

## 2017-04-13 PROCEDURE — 27800903 OPTIME MED/SURG SUP & DEVICES OTHER IMPLANTS: Performed by: ORTHOPAEDIC SURGERY

## 2017-04-13 PROCEDURE — 94761 N-INVAS EAR/PLS OXIMETRY MLT: CPT

## 2017-04-13 PROCEDURE — 76942 ECHO GUIDE FOR BIOPSY: CPT | Performed by: ANESTHESIOLOGY

## 2017-04-13 PROCEDURE — 63600175 PHARM REV CODE 636 W HCPCS

## 2017-04-13 PROCEDURE — 15738 MUSCLE-SKIN GRAFT LEG: CPT | Mod: LT,,, | Performed by: ORTHOPAEDIC SURGERY

## 2017-04-13 PROCEDURE — 25000003 PHARM REV CODE 250

## 2017-04-13 PROCEDURE — 97597 DBRDMT OPN WND 1ST 20 CM/<: CPT | Mod: 59,LT,, | Performed by: ORTHOPAEDIC SURGERY

## 2017-04-13 PROCEDURE — 71000033 HC RECOVERY, INTIAL HOUR: Performed by: ORTHOPAEDIC SURGERY

## 2017-04-13 PROCEDURE — 36000711: Performed by: ORTHOPAEDIC SURGERY

## 2017-04-13 PROCEDURE — 25000003 PHARM REV CODE 250: Performed by: NURSE ANESTHETIST, CERTIFIED REGISTERED

## 2017-04-13 PROCEDURE — 27720 REPAIR OF TIBIA: CPT | Mod: 51,LT,, | Performed by: ORTHOPAEDIC SURGERY

## 2017-04-13 PROCEDURE — 27200750 HC INSULATED NEEDLE/ STIMUPLEX: Performed by: ANESTHESIOLOGY

## 2017-04-13 PROCEDURE — 37000008 HC ANESTHESIA 1ST 15 MINUTES: Performed by: ORTHOPAEDIC SURGERY

## 2017-04-13 PROCEDURE — 63600175 PHARM REV CODE 636 W HCPCS: Performed by: NURSE ANESTHETIST, CERTIFIED REGISTERED

## 2017-04-13 PROCEDURE — 20694 RMVL EXT FIXJ SYS UNDER ANES: CPT | Mod: 51,LT,, | Performed by: ORTHOPAEDIC SURGERY

## 2017-04-13 PROCEDURE — D9220A PRA ANESTHESIA: Mod: CRNA,,, | Performed by: NURSE ANESTHETIST, CERTIFIED REGISTERED

## 2017-04-13 PROCEDURE — 71000039 HC RECOVERY, EACH ADD'L HOUR: Performed by: ORTHOPAEDIC SURGERY

## 2017-04-13 PROCEDURE — 64445 NJX AA&/STRD SCIATIC NRV IMG: CPT | Mod: 59,LT,, | Performed by: ANESTHESIOLOGY

## 2017-04-13 PROCEDURE — 27000221 HC OXYGEN, UP TO 24 HOURS

## 2017-04-13 PROCEDURE — 64447 NJX AA&/STRD FEMORAL NRV IMG: CPT | Mod: 59,LT,, | Performed by: ANESTHESIOLOGY

## 2017-04-13 PROCEDURE — 36000710: Performed by: ORTHOPAEDIC SURGERY

## 2017-04-13 PROCEDURE — 85018 HEMOGLOBIN: CPT

## 2017-04-13 DEVICE — PUTTY DBX 10CC: Type: IMPLANTABLE DEVICE | Site: TIBIA | Status: FUNCTIONAL

## 2017-04-13 DEVICE — SCREW LOCKING 3.5MM X 38MM: Type: IMPLANTABLE DEVICE | Site: TIBIA | Status: FUNCTIONAL

## 2017-04-13 DEVICE — SCREW LOCKING 3.5MM X 40MM: Type: IMPLANTABLE DEVICE | Site: TIBIA | Status: FUNCTIONAL

## 2017-04-13 DEVICE — BONE CHIP CANC 1.7-10MM 15ML: Type: IMPLANTABLE DEVICE | Site: TIBIA | Status: FUNCTIONAL

## 2017-04-13 DEVICE — SCREW CORTEX 3.5 X 26: Type: IMPLANTABLE DEVICE | Site: LEG | Status: FUNCTIONAL

## 2017-04-13 DEVICE — SCREW LOCKING 3.5MM X 45MM: Type: IMPLANTABLE DEVICE | Site: TIBIA | Status: FUNCTIONAL

## 2017-04-13 DEVICE — SCREW CORTEX 3.5MM X 28MM: Type: IMPLANTABLE DEVICE | Site: TIBIA | Status: FUNCTIONAL

## 2017-04-13 DEVICE — IMPLANTABLE DEVICE: Type: IMPLANTABLE DEVICE | Site: TIBIA | Status: FUNCTIONAL

## 2017-04-13 DEVICE — KIT MED BONE INFUSE: Type: IMPLANTABLE DEVICE | Site: TIBIA | Status: FUNCTIONAL

## 2017-04-13 RX ORDER — OXYCODONE HYDROCHLORIDE 5 MG/1
15 TABLET ORAL
Status: DISCONTINUED | OUTPATIENT
Start: 2017-04-13 | End: 2017-04-14 | Stop reason: HOSPADM

## 2017-04-13 RX ORDER — CELECOXIB 200 MG/1
CAPSULE ORAL
Status: COMPLETED
Start: 2017-04-13 | End: 2017-04-13

## 2017-04-13 RX ORDER — GABAPENTIN 300 MG/1
300 CAPSULE ORAL 3 TIMES DAILY
Status: DISCONTINUED | OUTPATIENT
Start: 2017-04-13 | End: 2017-04-14 | Stop reason: HOSPADM

## 2017-04-13 RX ORDER — FENTANYL CITRATE 50 UG/ML
25 INJECTION, SOLUTION INTRAMUSCULAR; INTRAVENOUS EVERY 5 MIN PRN
Status: DISCONTINUED | OUTPATIENT
Start: 2017-04-13 | End: 2017-04-13

## 2017-04-13 RX ORDER — SODIUM CHLORIDE 0.9 % (FLUSH) 0.9 %
3 SYRINGE (ML) INJECTION EVERY 8 HOURS
Status: DISCONTINUED | OUTPATIENT
Start: 2017-04-13 | End: 2017-04-13 | Stop reason: HOSPADM

## 2017-04-13 RX ORDER — CELECOXIB 200 MG/1
200 CAPSULE ORAL DAILY
Status: DISCONTINUED | OUTPATIENT
Start: 2017-04-14 | End: 2017-04-14 | Stop reason: HOSPADM

## 2017-04-13 RX ORDER — OXYCODONE HYDROCHLORIDE 5 MG/1
TABLET ORAL
Status: COMPLETED
Start: 2017-04-13 | End: 2017-04-13

## 2017-04-13 RX ORDER — ACETAMINOPHEN 10 MG/ML
1000 INJECTION, SOLUTION INTRAVENOUS EVERY 6 HOURS
Status: DISCONTINUED | OUTPATIENT
Start: 2017-04-13 | End: 2017-04-14 | Stop reason: HOSPADM

## 2017-04-13 RX ORDER — SODIUM CHLORIDE 9 MG/ML
INJECTION, SOLUTION INTRAVENOUS CONTINUOUS
Status: DISCONTINUED | OUTPATIENT
Start: 2017-04-13 | End: 2017-04-14 | Stop reason: HOSPADM

## 2017-04-13 RX ORDER — TAMSULOSIN HYDROCHLORIDE 0.4 MG/1
0.4 CAPSULE ORAL DAILY
Status: DISCONTINUED | OUTPATIENT
Start: 2017-04-14 | End: 2017-04-14 | Stop reason: HOSPADM

## 2017-04-13 RX ORDER — ATORVASTATIN CALCIUM 20 MG/1
40 TABLET, FILM COATED ORAL DAILY
Status: DISCONTINUED | OUTPATIENT
Start: 2017-04-14 | End: 2017-04-14 | Stop reason: HOSPADM

## 2017-04-13 RX ORDER — SODIUM CHLORIDE 0.9 % (FLUSH) 0.9 %
3 SYRINGE (ML) INJECTION
Status: DISCONTINUED | OUTPATIENT
Start: 2017-04-13 | End: 2017-04-13 | Stop reason: HOSPADM

## 2017-04-13 RX ORDER — OXYCODONE HYDROCHLORIDE 5 MG/1
10 TABLET ORAL
Status: DISCONTINUED | OUTPATIENT
Start: 2017-04-13 | End: 2017-04-14 | Stop reason: HOSPADM

## 2017-04-13 RX ORDER — OXYCODONE HCL 10 MG/1
10 TABLET, FILM COATED, EXTENDED RELEASE ORAL EVERY 12 HOURS
Status: DISCONTINUED | OUTPATIENT
Start: 2017-04-13 | End: 2017-04-14 | Stop reason: HOSPADM

## 2017-04-13 RX ORDER — CELECOXIB 200 MG/1
400 CAPSULE ORAL ONCE
Status: COMPLETED | OUTPATIENT
Start: 2017-04-13 | End: 2017-04-13

## 2017-04-13 RX ORDER — MIDAZOLAM HYDROCHLORIDE 1 MG/ML
0.5 INJECTION INTRAMUSCULAR; INTRAVENOUS EVERY 5 MIN PRN
Status: DISCONTINUED | OUTPATIENT
Start: 2017-04-13 | End: 2017-04-13

## 2017-04-13 RX ORDER — OXYCODONE AND ACETAMINOPHEN 10; 325 MG/1; MG/1
1 TABLET ORAL EVERY 4 HOURS PRN
Qty: 90 TABLET | Refills: 0 | Status: SHIPPED | OUTPATIENT
Start: 2017-04-13 | End: 2017-07-06

## 2017-04-13 RX ORDER — FENTANYL CITRATE 50 UG/ML
INJECTION, SOLUTION INTRAMUSCULAR; INTRAVENOUS
Status: DISCONTINUED | OUTPATIENT
Start: 2017-04-13 | End: 2017-04-13

## 2017-04-13 RX ORDER — DOCUSATE SODIUM 100 MG/1
100 CAPSULE, LIQUID FILLED ORAL 2 TIMES DAILY
Status: DISCONTINUED | OUTPATIENT
Start: 2017-04-13 | End: 2017-04-14 | Stop reason: HOSPADM

## 2017-04-13 RX ORDER — ONDANSETRON 2 MG/ML
4 INJECTION INTRAMUSCULAR; INTRAVENOUS DAILY PRN
Status: DISCONTINUED | OUTPATIENT
Start: 2017-04-13 | End: 2017-04-13 | Stop reason: HOSPADM

## 2017-04-13 RX ORDER — DIPHENHYDRAMINE HCL 25 MG
25 CAPSULE ORAL EVERY 6 HOURS PRN
Status: DISCONTINUED | OUTPATIENT
Start: 2017-04-13 | End: 2017-04-14 | Stop reason: HOSPADM

## 2017-04-13 RX ORDER — CIPROFLOXACIN 2 MG/ML
INJECTION, SOLUTION INTRAVENOUS
Status: DISCONTINUED | OUTPATIENT
Start: 2017-04-13 | End: 2017-04-13

## 2017-04-13 RX ORDER — PROMETHAZINE HYDROCHLORIDE 12.5 MG/1
12.5 TABLET ORAL EVERY 6 HOURS PRN
Qty: 30 TABLET | Refills: 0 | Status: SHIPPED | OUTPATIENT
Start: 2017-04-13 | End: 2017-07-06

## 2017-04-13 RX ORDER — DOCUSATE SODIUM 100 MG/1
100 CAPSULE, LIQUID FILLED ORAL 2 TIMES DAILY PRN
Qty: 60 CAPSULE | Refills: 0 | Status: SHIPPED | OUTPATIENT
Start: 2017-04-13 | End: 2017-07-06

## 2017-04-13 RX ORDER — RAMELTEON 8 MG/1
8 TABLET ORAL NIGHTLY PRN
Status: DISCONTINUED | OUTPATIENT
Start: 2017-04-13 | End: 2017-04-14 | Stop reason: HOSPADM

## 2017-04-13 RX ORDER — SODIUM CHLORIDE 9 MG/ML
INJECTION, SOLUTION INTRAVENOUS CONTINUOUS PRN
Status: DISCONTINUED | OUTPATIENT
Start: 2017-04-13 | End: 2017-04-13

## 2017-04-13 RX ORDER — PROPOFOL 10 MG/ML
VIAL (ML) INTRAVENOUS
Status: DISCONTINUED | OUTPATIENT
Start: 2017-04-13 | End: 2017-04-13

## 2017-04-13 RX ORDER — OXYCODONE HYDROCHLORIDE 5 MG/1
5 TABLET ORAL
Status: DISCONTINUED | OUTPATIENT
Start: 2017-04-13 | End: 2017-04-14 | Stop reason: HOSPADM

## 2017-04-13 RX ORDER — MORPHINE SULFATE 2 MG/ML
2 INJECTION, SOLUTION INTRAMUSCULAR; INTRAVENOUS
Status: DISCONTINUED | OUTPATIENT
Start: 2017-04-13 | End: 2017-04-14 | Stop reason: HOSPADM

## 2017-04-13 RX ORDER — FUROSEMIDE 20 MG/1
20 TABLET ORAL 2 TIMES DAILY
Status: DISCONTINUED | OUTPATIENT
Start: 2017-04-13 | End: 2017-04-14 | Stop reason: HOSPADM

## 2017-04-13 RX ORDER — MIDAZOLAM HYDROCHLORIDE 1 MG/ML
INJECTION, SOLUTION INTRAMUSCULAR; INTRAVENOUS
Status: DISCONTINUED | OUTPATIENT
Start: 2017-04-13 | End: 2017-04-13

## 2017-04-13 RX ORDER — ASPIRIN 325 MG
325 TABLET ORAL DAILY
Status: DISCONTINUED | OUTPATIENT
Start: 2017-04-14 | End: 2017-04-14 | Stop reason: HOSPADM

## 2017-04-13 RX ORDER — LIDOCAINE HCL/PF 100 MG/5ML
SYRINGE (ML) INTRAVENOUS
Status: DISCONTINUED | OUTPATIENT
Start: 2017-04-13 | End: 2017-04-13

## 2017-04-13 RX ORDER — VENLAFAXINE 75 MG/1
150 TABLET ORAL 2 TIMES DAILY
Status: DISCONTINUED | OUTPATIENT
Start: 2017-04-13 | End: 2017-04-14 | Stop reason: HOSPADM

## 2017-04-13 RX ORDER — MORPHINE SULFATE 2 MG/ML
4 INJECTION, SOLUTION INTRAMUSCULAR; INTRAVENOUS
Status: DISCONTINUED | OUTPATIENT
Start: 2017-04-13 | End: 2017-04-14 | Stop reason: HOSPADM

## 2017-04-13 RX ORDER — PANTOPRAZOLE SODIUM 40 MG/1
40 TABLET, DELAYED RELEASE ORAL DAILY
Status: DISCONTINUED | OUTPATIENT
Start: 2017-04-14 | End: 2017-04-14 | Stop reason: HOSPADM

## 2017-04-13 RX ORDER — MUPIROCIN 20 MG/G
1 OINTMENT TOPICAL
Status: COMPLETED | OUTPATIENT
Start: 2017-04-13 | End: 2017-04-13

## 2017-04-13 RX ORDER — MORPHINE SULFATE 2 MG/ML
6 INJECTION, SOLUTION INTRAMUSCULAR; INTRAVENOUS
Status: DISCONTINUED | OUTPATIENT
Start: 2017-04-13 | End: 2017-04-14 | Stop reason: HOSPADM

## 2017-04-13 RX ORDER — ROCURONIUM BROMIDE 10 MG/ML
INJECTION, SOLUTION INTRAVENOUS
Status: DISCONTINUED | OUTPATIENT
Start: 2017-04-13 | End: 2017-04-13

## 2017-04-13 RX ORDER — ONDANSETRON 8 MG/1
8 TABLET, ORALLY DISINTEGRATING ORAL EVERY 8 HOURS PRN
Status: DISCONTINUED | OUTPATIENT
Start: 2017-04-13 | End: 2017-04-14 | Stop reason: HOSPADM

## 2017-04-13 RX ORDER — HYDROMORPHONE HYDROCHLORIDE 2 MG/ML
INJECTION, SOLUTION INTRAMUSCULAR; INTRAVENOUS; SUBCUTANEOUS
Status: DISCONTINUED | OUTPATIENT
Start: 2017-04-13 | End: 2017-04-13

## 2017-04-13 RX ORDER — ACETAMINOPHEN 10 MG/ML
INJECTION, SOLUTION INTRAVENOUS
Status: COMPLETED
Start: 2017-04-13 | End: 2017-04-13

## 2017-04-13 RX ORDER — HYDROMORPHONE HYDROCHLORIDE 1 MG/ML
0.2 INJECTION, SOLUTION INTRAMUSCULAR; INTRAVENOUS; SUBCUTANEOUS EVERY 5 MIN PRN
Status: DISCONTINUED | OUTPATIENT
Start: 2017-04-13 | End: 2017-04-13 | Stop reason: HOSPADM

## 2017-04-13 RX ORDER — FENTANYL CITRATE 50 UG/ML
25 INJECTION, SOLUTION INTRAMUSCULAR; INTRAVENOUS EVERY 5 MIN PRN
Status: DISCONTINUED | OUTPATIENT
Start: 2017-04-13 | End: 2017-04-13 | Stop reason: HOSPADM

## 2017-04-13 RX ADMIN — MUPIROCIN 1 G: 20 OINTMENT TOPICAL at 09:04

## 2017-04-13 RX ADMIN — CELECOXIB 400 MG: 200 CAPSULE ORAL at 05:04

## 2017-04-13 RX ADMIN — FENTANYL CITRATE 100 MCG: 50 INJECTION, SOLUTION INTRAMUSCULAR; INTRAVENOUS at 12:04

## 2017-04-13 RX ADMIN — OXYCODONE HYDROCHLORIDE 15 MG: 5 TABLET ORAL at 05:04

## 2017-04-13 RX ADMIN — ACETAMINOPHEN 1000 MG: 10 INJECTION, SOLUTION INTRAVENOUS at 05:04

## 2017-04-13 RX ADMIN — SODIUM CHLORIDE, SODIUM GLUCONATE, SODIUM ACETATE, POTASSIUM CHLORIDE, MAGNESIUM CHLORIDE, SODIUM PHOSPHATE, DIBASIC, AND POTASSIUM PHOSPHATE: .53; .5; .37; .037; .03; .012; .00082 INJECTION, SOLUTION INTRAVENOUS at 02:04

## 2017-04-13 RX ADMIN — HYDROMORPHONE HYDROCHLORIDE 0.4 MG: 2 INJECTION, SOLUTION INTRAMUSCULAR; INTRAVENOUS; SUBCUTANEOUS at 04:04

## 2017-04-13 RX ADMIN — MIDAZOLAM HYDROCHLORIDE 1 MG: 1 INJECTION, SOLUTION INTRAMUSCULAR; INTRAVENOUS at 12:04

## 2017-04-13 RX ADMIN — VANCOMYCIN HYDROCHLORIDE 1500 MG: 1 INJECTION, POWDER, LYOPHILIZED, FOR SOLUTION INTRAVENOUS at 09:04

## 2017-04-13 RX ADMIN — DEXTROSE 3 G: 50 INJECTION, SOLUTION INTRAVENOUS at 12:04

## 2017-04-13 RX ADMIN — PROPOFOL 50 MG: 10 INJECTION, EMULSION INTRAVENOUS at 02:04

## 2017-04-13 RX ADMIN — VENLAFAXINE 150 MG: 75 TABLET ORAL at 09:04

## 2017-04-13 RX ADMIN — ROCURONIUM BROMIDE 20 MG: 10 INJECTION, SOLUTION INTRAVENOUS at 02:04

## 2017-04-13 RX ADMIN — DEXTROSE 1 G: 50 INJECTION, SOLUTION INTRAVENOUS at 12:04

## 2017-04-13 RX ADMIN — SODIUM CHLORIDE: 0.9 INJECTION, SOLUTION INTRAVENOUS at 11:04

## 2017-04-13 RX ADMIN — CIPROFLOXACIN 900 MG: 2 INJECTION, SOLUTION INTRAVENOUS at 12:04

## 2017-04-13 RX ADMIN — FUROSEMIDE 20 MG: 20 TABLET ORAL at 09:04

## 2017-04-13 RX ADMIN — SODIUM CHLORIDE: 0.9 INJECTION, SOLUTION INTRAVENOUS at 05:04

## 2017-04-13 RX ADMIN — HYDROMORPHONE HYDROCHLORIDE 0.4 MG: 2 INJECTION, SOLUTION INTRAMUSCULAR; INTRAVENOUS; SUBCUTANEOUS at 02:04

## 2017-04-13 RX ADMIN — OXYCODONE HYDROCHLORIDE 10 MG: 10 TABLET, FILM COATED, EXTENDED RELEASE ORAL at 09:04

## 2017-04-13 RX ADMIN — GABAPENTIN 300 MG: 300 CAPSULE ORAL at 09:04

## 2017-04-13 RX ADMIN — PROPOFOL 150 MG: 10 INJECTION, EMULSION INTRAVENOUS at 12:04

## 2017-04-13 RX ADMIN — MIDAZOLAM HYDROCHLORIDE 1 MG: 1 INJECTION, SOLUTION INTRAMUSCULAR; INTRAVENOUS at 10:04

## 2017-04-13 RX ADMIN — OXYCODONE HYDROCHLORIDE 15 MG: 5 TABLET ORAL at 09:04

## 2017-04-13 RX ADMIN — DOCUSATE SODIUM 100 MG: 100 CAPSULE, LIQUID FILLED ORAL at 09:04

## 2017-04-13 RX ADMIN — ROCURONIUM BROMIDE 50 MG: 10 INJECTION, SOLUTION INTRAVENOUS at 12:04

## 2017-04-13 RX ADMIN — LIDOCAINE HYDROCHLORIDE 20 MG: 20 INJECTION, SOLUTION INTRAVENOUS at 12:04

## 2017-04-13 RX ADMIN — ACETAMINOPHEN 1000 MG: 10 INJECTION, SOLUTION INTRAVENOUS at 11:04

## 2017-04-13 NOTE — ANESTHESIA PROCEDURE NOTES
Saphenous    Patient location during procedure: pre-op   Block not for primary anesthetic.  Reason for block: at surgeon's request and post-op pain management   Post-op Pain Location: left ankle pain   Staffing  Anesthesiologist: RACHAEL HANSON  Resident/CRNA: JULI LEVI  Performed by: resident/CRNA   Preanesthetic Checklist  Completed: patient identified, site marked, surgical consent, pre-op evaluation, timeout performed, IV checked, risks and benefits discussed and monitors and equipment checked  Peripheral Block  Patient position: supine  Prep: ChloraPrep  Patient monitoring: cardiac monitor, frequent blood pressure checks and continuous pulse ox  Block type: saphenous  Laterality: left  Injection technique: single shot  Needle  Needle type: Stimuplex   Needle gauge: 21 G  Needle length: 4 in  Needle localization: ultrasound guidance and anatomical landmarks   -ultrasound image captured on disc.  Assessment  Injection assessment: local visualized surrounding nerve, negative aspiration and negative parasthesia  Paresthesia pain: none  Heart rate change: no  Slow fractionated injection: yes  Medications:  Bolus administered: 10 mL of 0.5 bupivacaine  Epinephrine added: 3.75 mcg/mL (1/300,000)

## 2017-04-13 NOTE — PROGRESS NOTES
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 The patient also has a left heel ulcer.   He's unergone treatment for the heel ulcer which has improved.  He's had the external fixation on now for 3 months but is having increased pain in the leg, 5/10 with some movement of the ex fix pins.  He had an open fracture initially, but indium bone scan after he broke the first plate was negative.  I have been trying to avoid re-fixation while the heel ulcer is present, but he's now gone back into valgus and the pins are loose.  He has a ceullulitis a few weeks ago which has resolved, but we've kept him on IV abx with a PICC line.     ROS:  Patient denies constitutional symptoms, cardiac symptoms, respiratory symptoms, GI symptoms.  The remainder of the musculoskeletal ROS is included in the HPI.    PE:    AA&O x 4.  NAD  HEENT:  NCAT, sclera nonicteric  Lungs:  Respirations are equal and unlabored.  CV:  2+ bilateral upper and lower extremity pulses.      MS:  Pins with no purulent d/c.      PE:  Pins look good.  Skin swelling way down.  No erythema.  Mild serous d/c.  Heel wound much improved with much smaller base with some eschar.      A/P:  At this point, the fixator is loose.  I will have to remove this and likely revise the ORIF.  I still can't realistically plate the fibula a given the location of his origninal open injury, but may be able to use a rush madonna here.  We discussed the infection risks at length.  The risks, benefits and alternatives to surgery were discussed with the patient at great length.  These include bleeding, infection, vessel/nerve damage, pain, numbness, tingling, complex regional pain syndrome, hardware/surgical failure, need for further surgery, malunion, nonunion, DVT, PE, arthritis and death.  Patient states an understanding and wishes to proceed with surgery.   All questions were answered.  No guarantees were implied or  stated. We have discussed that this could ultimately lead to amputation.   Informed consent was obtained.    Walter Cortés MD

## 2017-04-13 NOTE — IP AVS SNAPSHOT
[facility]  [address]  [facility] [address]  Phone: [phone]           Patient Discharge Instructions   Our goal is to set you up for success. This packet includes information on your condition, medications, and your home care.  It will help you care for yourself to prevent having to return to the hospital.     Please ask your nurse if you have any questions.      There are many details to remember when preparing to leave the hospital. Here is what you will need to do:    1. Take your medicine. If you are prescribed medications, review your Medication List on the following pages. You may have new medications to  at the pharmacy and others that you'll need to stop taking. Review the instructions for how and when to take your medications. Talk with your doctor or nurses if you are unsure of what to do.     2. Go to your follow-up appointments. Specific follow-up information is listed in the following pages. Your may be contacted by a nurse or clinical provider about future appointments. Be sure we have all of the phone numbers to reach you. Please contact your provider's office if you are unable to make an appointment.     3. Watch for warning signs. Your doctor or nurse will give you detailed warning signs to watch for and when to call for assistance. These instructions may also include educational information about your condition. If you experience any of warning signs to your health, call your doctor.           [facility]  Unless otherwise directed by your provider, please   contact [facility], our nurse care line   that is available for 24/7 assistance.     [phone] (toll-free)     Registered nurses in the [facility]   provide: appointment scheduling, clinical advisement, health education, and other advisory services.                  ** Verify the list of medication(s) below is accurate and up to date. Carry this with you in case of  emergency. If your medications have changed, please notify your healthcare provider.             Medication List      START taking these medications        Additional Info                      docusate sodium 100 MG capsule   Commonly known as:  COLACE   Quantity:  60 capsule   Refills:  0   Dose:  100 mg    Last time this was given:  100 mg on 4/13/2017  9:43 PM   Instructions:  Take 1 capsule (100 mg total) by mouth 2 (two) times daily as needed for Constipation.     Begin Date    AM    Noon    PM    Bedtime       promethazine 12.5 MG Tab   Commonly known as:  PHENERGAN   Quantity:  30 tablet   Refills:  0   Dose:  12.5 mg    Instructions:  Take 1 tablet (12.5 mg total) by mouth every 6 (six) hours as needed (for nausea).     Begin Date    AM    Noon    PM    Bedtime         CHANGE how you take these medications        Additional Info                      artificial tears 0.5 % ophthalmic solution   Commonly known as:  ISOPTO TEARS   Refills:  0   Dose:  1 drop   What changed:  reasons to take this    Instructions:  Place 1 drop into both eyes as needed.     Begin Date    AM    Noon    PM    Bedtime       cetirizine 10 MG tablet   Commonly known as:  ZYRTEC   Refills:  0   Dose:  10 mg   What changed:    - when to take this  - reasons to take this    Instructions:  Take 1 tablet (10 mg total) by mouth once daily.     Begin Date    AM    Noon    PM    Bedtime       gabapentin 300 MG capsule   Commonly known as:  NEURONTIN   Quantity:  90 capsule   Refills:  11   Dose:  300 mg   What changed:    - how much to take  - how to take this  - when to take this  - additional instructions    Last time this was given:  300 mg on 4/14/2017  6:11 AM   Instructions:  Take 1 capsule (300 mg total) by mouth 3 (three) times daily.     Begin Date    AM    Noon    PM    Bedtime       oxycodone-acetaminophen  mg per tablet   Commonly known as:  PERCOCET   Quantity:  90 tablet   Refills:  0   Dose:  1 tablet   What changed:   when to take this    Instructions:  Take 1 tablet by mouth every 4 (four) hours as needed for Pain.     Begin Date    AM    Noon    PM    Bedtime         CONTINUE taking these medications        Additional Info                      aspirin 325 MG tablet   Refills:  0   Dose:  325 mg    Instructions:  Take 1 tablet (325 mg total) by mouth once daily.     Begin Date    AM    Noon    PM    Bedtime       atorvastatin 40 MG tablet   Commonly known as:  LIPITOR   Quantity:  90 tablet   Refills:  0    Instructions:  TAKE ONE TABLET BY MOUTH ONCE DAILY     Begin Date    AM    Noon    PM    Bedtime       collagenase ointment   Quantity:  60 g   Refills:  0    Instructions:  Apply topically every Mon, Wed, Fri. Nursing to apply Santyl  nickel thick to wound bed and edges and cover with damp gauze (to activate santyl) daily and cover with telfa island dressing (mepore) over each pressure injury. Santyl has autolytic debridement properties and will promote healing.     Begin Date    AM    Noon    PM    Bedtime       dextrose 5 % SolP 250 mL with vancomycin 1,000 mg SolR 1,500 mg   Refills:  0   Dose:  1500 mg   Indications:  Bone/Joint    Last time this was given:  1,500 mg on 4/13/2017  9:40 PM   Instructions:  Inject 1,500 mg into the vein once daily. 4/26/17=stop date.     Begin Date    AM    Noon    PM    Bedtime       diclofenac sodium 1 % Gel   Quantity:  1 Tube   Refills:  0   Dose:  2 g    Instructions:  Apply 2 g topically once daily. Apply 2 g to left shoulder     Begin Date    AM    Noon    PM    Bedtime       diphenhydrAMINE 25 mg capsule   Commonly known as:  BENADRYL   Refills:  0   Dose:  25 mg    Instructions:  Take 1 each (25 mg total) by mouth every 6 (six) hours as needed for Itching.     Begin Date    AM    Noon    PM    Bedtime       furosemide 20 MG tablet   Commonly known as:  LASIX   Quantity:  10 tablet   Refills:  0   Dose:  20 mg    Last time this was given:  20 mg on 4/13/2017  9:42 PM    Instructions:  Take 1 tablet (20 mg total) by mouth 2 (two) times daily.     Begin Date    AM    Noon    PM    Bedtime       melatonin 3 mg Tab   Refills:  0   Dose:  6 mg    Instructions:  Take 6 mg by mouth nightly as needed (for sleep).     Begin Date    AM    Noon    PM    Bedtime       miconazole 2 % cream   Commonly known as:  MICOTIN   Refills:  0    Instructions:  Apply topically 2 (two) times daily.     Begin Date    AM    Noon    PM    Bedtime       omeprazole 20 MG capsule   Commonly known as:  PRILOSEC   Quantity:  180 capsule   Refills:  0   Dose:  20 mg    Instructions:  Take 1 capsule (20 mg total) by mouth once daily.     Begin Date    AM    Noon    PM    Bedtime       ramipril 5 MG capsule   Commonly known as:  ALTACE   Quantity:  90 capsule   Refills:  0    Instructions:  TAKE ONE CAPSULE BY MOUTH ONCE DAILY     Begin Date    AM    Noon    PM    Bedtime       tamsulosin 0.4 mg Cp24   Commonly known as:  FLOMAX   Quantity:  90 capsule   Refills:  3   Dose:  0.4 mg    Instructions:  Take 1 capsule (0.4 mg total) by mouth once daily.     Begin Date    AM    Noon    PM    Bedtime       venlafaxine 75 MG tablet   Commonly known as:  EFFEXOR   Quantity:  360 tablet   Refills:  0   Dose:  150 mg    Last time this was given:  150 mg on 4/13/2017  9:43 PM   Instructions:  Take 2 tablets (150 mg total) by mouth 2 (two) times daily.     Begin Date    AM    Noon    PM    Bedtime       VITAMIN B-12 ORAL   Refills:  0   Dose:  2500 mcg    Instructions:  Take 2,500 mcg by mouth once daily.     Begin Date    AM    Noon    PM    Bedtime       vitamin D 1000 units Tab   Refills:  0   Dose:  1000 Units    Instructions:  Take 1 tablet (1,000 Units total) by mouth once daily.     Begin Date    AM    Noon    PM    Bedtime       zinc sulfate 220 (50) mg capsule   Commonly known as:  ZINCATE   Refills:  0   Dose:  220 mg    Instructions:  Take 1 capsule (220 mg total) by mouth once daily.     Begin Date    AM    Noon     PM    Bedtime         STOP taking these medications     acetaminophen 325 MG tablet   Commonly known as:  TYLENOL            Where to Get Your Medications      You can get these medications from any pharmacy     Bring a paper prescription for each of these medications     docusate sodium 100 MG capsule    oxycodone-acetaminophen  mg per tablet    promethazine 12.5 MG Tab                  Please bring to all follow up appointments:    1. A copy of your discharge instructions.  2. All medicines you are currently taking in their original bottles.  3. Identification and insurance card.    Please arrive 15 minutes ahead of scheduled appointment time.    Please call 24 hours in advance if you must reschedule your appointment and/or time.        Your Scheduled Appointments     Apr 19, 2017 10:00 AM CDT   Consult with MD Reji Carmen - Cardiology ([facility] )    [address]  [place] [address] [phone]            Apr 21, 2017  1:40 PM CDT   Wound Check with PACEMAKER, ICD   Reji Hemphill - Arrhythmia ([facility] )    [address]  [place] [address] [phone]            Apr 24, 2017  8:00 AM CDT   [facility]   [facility]    [address] [facility] [place] [address] [phone]            Apr 26, 2017  1:00 PM CDT   Established Patient Visit with JIMMY Meléndez, SARAH [address] - Infectious Diseases ([facility] )    [address]  [place] [address] [phone]            Apr 26, 2017  2:40 PM CDT   Consult with MD Reji Schaeffer - Nephrology ([facility] )    [address]  [place] [address] [phone]              Follow-up Information     Follow up with Walter Cortés MD In 2 weeks.    Specialty:  Orthopedic Surgery    Contact information:    [facility] [address] [phone]          Discharge  "Instructions     Future Orders    Call MD for:  difficulty breathing or increased cough     Call MD for:  increased confusion or weakness     Call MD for:  persistent dizziness, light-headedness, or visual disturbances     Call MD for:  persistent nausea and vomiting or diarrhea     Call MD for:  redness, tenderness, or signs of infection (pain, swelling, redness, odor or green/yellow discharge around incision site)     Call MD for:  severe persistent headache     Call MD for:  severe uncontrolled pain     Call MD for:  temperature >100.4     Call MD for:  worsening rash     COMMODE FOR HOME USE     Questions:    Type:  Standard    Height:  6' 1" (1.854 m)    Weight:  145.2 kg (320 lb)    Does patient have medical equipment at home?:      Length of need (1-99 months):  6    Diet general     Questions:    Total calories:      Fat restriction, if any:      Protein restriction, if any:      Na restriction, if any:      Fluid restriction:      Additional restrictions:      TRANSFER TUB BENCH FOR HOME USE     Questions:    Type of Transfer Tub Bench:  Padded    Height:  6' 1" (1.854 m)    Weight:  145.2 kg (320 lb)    Does patient have medical equipment at home?:      Length of need (1-99 months):  99    WALKER FOR HOME USE     Questions:    Type of Walker:  Adult (5'4"-6'6")    With wheels?:  No    Height:  6' 1" (1.854 m)    Weight:  145.2 kg (320 lb)    Length of need (1-99 months):  6    Platform attachment:      Accessories/Other:      Assistance needed:      Does patient have medical equipment at home?:      Weight bearing restrictions (specify)     Comments:    Non weight bearing operative extremity        Primary Diagnosis     Your primary diagnosis was:  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      Admission Information     Date & Time Provider Department CSN    4/13/2017  6:39 AM Walter Cortés MD [facility] [id number]      Care Providers     " "Provider Role Specialty Primary office phone    Walter Cortés MD Attending Provider Orthopedic Surgery [phone]    Walter Cortés MD Surgeon  Orthopedic Surgery [phone]      Your Vitals Were     BP Pulse Temp Resp Height Weight    112/67 (BP Location: Right arm, Patient Position: Lying, BP Method: Automatic) 88 97.3 °F (36.3 °C) (Oral) 16 6' 1" (1.854 m) 145.2 kg (320 lb)    SpO2 BMI             92% 42.22 kg/m2         Recent Lab Values        7/13/2010 3/18/2011 2/10/2012 7/27/2012 11/18/2013 3/3/2014 3/31/2015 7/11/2016      8:35 AM  8:54 AM  8:56 AM  9:25 AM  3:45 PM 10:52 AM 10:35 AM 11:29 AM    A1C 5.3 5.6 5.5 5.4 5.5 5.6 5.3 5.3    Comment for A1C at 11:29 AM on 7/11/2016:  According to ADA guidelines, hemoglobin A1C <7.0% represents  optimal control in non-pregnant diabetic patients.  Different  metrics may apply to specific populations.   Standards of Medical Care in Diabetes - 2016.  For the purpose of screening for the presence of diabetes:  <5.7%     Consistent with the absence of diabetes  5.7-6.4%  Consistent with increasing risk for diabetes   (prediabetes)  >or=6.5%  Consistent with diabetes  Currently no consensus exists for use of hemoglobin A1C  for diagnosis of diabetes for children.        Allergies as of 4/14/2017        Reactions    Ciprofloxacin Rash    Diffuse pruritic morbilliform rash developed 3/15/2017 after dose of cipro; previously in 2/2017 he had rash/fevers after initiation of cipro    Zosyn [Piperacillin-tazobactam] Anaphylaxis    Diffuse pruritic morbilliform rash developed 3/15/2017.  Then, 430am dose on 3/16 and rash worsened with SOB/tachypnea but no hypoxemia.     Bacitracin Itching, Rash    Violaceous rash in area of topical Tx.       Advance Directives     An advance directive is a document which, in the event you are no longer able to make decisions for yourself, tells your healthcare team what kind of treatment you do or do not want to receive, or who you " would like to make those decisions for you.  If you do not currently have an advance directive, [facility] encourages you to create one.  For more information call:  [phone]), [phone] ([phone]),  or log on to [email or web address].        Language Assistance Services     ATTENTION: Language assistance services are available, free of charge. Please call [phone].      ATENCIÓN: Si habla español, tiene a robison disposición servicios gratuitos de asistencia lingüística. Llame al [phone].     CHÚ Ý: N?u b?n nói Ti?ng Vi?t, có các d?ch v? h? tr? ngôn ng? mi?n phí dành cho b?n. G?i s? [phone].         [facility] complies with applicable Federal civil rights laws and does not discriminate on the basis of race, color, national origin, age, disability, or sex.

## 2017-04-13 NOTE — ANESTHESIA PREPROCEDURE EVALUATION
04/13/2017  Janusz Montgomery Jr. is a 72 y.o., male.    [facility] Anesthesia Evaluation    I have reviewed the Patient Summary Reports.        Review of Systems  Social:  Non-Smoker        Physical Exam  General:  Well nourished, Obesity    Airway/Jaw/Neck:  Airway Findings: Mouth Opening: Normal Tongue: Normal  General Airway Assessment: Adult  Mallampati: III  TM Distance: Normal, at least 6 cm  Jaw/Neck Findings:  Neck ROM: Normal ROM       Chest/Lungs:  Chest/Lungs Findings: Clear to auscultation     Heart/Vascular:  Heart Findings: Rate: Normal  Rhythm: Regular Rhythm        Mental Status:  Mental Status Findings:  Cooperative, Alert and Oriented         Anesthesia Plan  Type of Anesthesia, risks & benefits discussed:  Anesthesia Type:  general  Patient's Preference:   Intra-op Monitoring Plan:   Intra-op Monitoring Plan Comments:   Post Op Pain Control Plan:   Post Op Pain Control Plan Comments:   Induction:    Beta Blocker:  Patient is not currently on a Beta-Blocker (No further documentation required).       Informed Consent: Patient understands risks and agrees with Anesthesia plan.  Questions answered. Anesthesia consent signed with patient.  ASA Score: 3     Day of Surgery Review of History & Physical:            Ready For Surgery From Anesthesia Perspective.

## 2017-04-13 NOTE — ANESTHESIA POSTPROCEDURE EVALUATION
"Anesthesia Post Evaluation    Patient: Janusz Montgomery Jr.    Procedure(s) Performed: Procedure(s) (LRB):  REMOVAL OF EXTERNAL FIXATION DEVICE, LEFT ANKLE C ARM  (Left)  OPEN REDUCTION INTERNAL [id number]/FIBULA (Left)  REMOVAL-HARDWARE-LEG (Left)  DEBRIDEMENT-FOOT (Left)    Final Anesthesia Type: general  Patient location during evaluation: PACU  Patient participation: Yes- Able to Participate  Level of consciousness: awake and alert  Post-procedure vital signs: reviewed and stable  Pain management: adequate  Airway patency: patent  PONV status at discharge: No PONV  Anesthetic complications: no      Cardiovascular status: blood pressure returned to baseline  Respiratory status: unassisted  Hydration status: euvolemic  Follow-up not needed.        Visit Vitals    BP (!) 143/65    Pulse 76    Temp 36.7 °C (98 °F) (Oral)    Resp 17    Ht 6' 1" (1.854 m)    Wt (!) 145.2 kg (320 lb)    SpO2 98%    BMI 42.22 kg/m2       Pain/Serjio Score: Pain Assessment Performed: Yes (4/13/2017  4:45 PM)  Presence of Pain: denies (4/13/2017  4:45 PM)  Pain Rating Prior to Med Admin: 0 (4/13/2017  5:39 PM)  Serjio Score: 9 (4/13/2017  4:45 PM)      "

## 2017-04-13 NOTE — TRANSFER OF CARE
"Anesthesia Transfer of Care Note    Patient: Janusz Montgomery Jr.    Procedure(s) Performed: Procedure(s) (LRB):  REMOVAL OF EXTERNAL FIXATION DEVICE, LEFT ANKLE C ARM  (Left)  OPEN REDUCTION INTERNAL [id number]/FIBULA (Left)  REMOVAL-HARDWARE-LEG (Left)  DEBRIDEMENT-FOOT (Left)    Patient location: PACU    Anesthesia Type: general    Transport from OR: Transported from OR on room air with adequate spontaneous ventilation    Post pain: adequate analgesia    Post assessment: no apparent anesthetic complications and tolerated procedure well    Post vital signs: stable    Level of consciousness: awake, alert and oriented    Nausea/Vomiting: no nausea/vomiting    Complications: none          Last vitals:   Visit Vitals    /63    Pulse 96    Temp 36.7 °C (98 °F) (Oral)    Resp (!) 21    Ht 6' 1" (1.854 m)    Wt (!) 145.2 kg (320 lb)    SpO2 98%    BMI 42.22 kg/m2     "

## 2017-04-13 NOTE — NURSING TRANSFER
Nursing Transfer Note      4/13/2017     Transfer to 504    Transfer via stretcher    Transfer with portable O2, IV pole, wound vac and prevenia plus system    Transported by pct    Medicines sent: IV fluids    Chart send with patient: yes    Notified: wife and daughter at bedside    Patient reassessed at: 4/13/17 @ 1800

## 2017-04-13 NOTE — PLAN OF CARE
Problem: Patient Care Overview  Goal: Plan of Care Review  Outcome: Ongoing (interventions implemented as appropriate)  Vital signs stable. Afebrile. Alert, oriented and following commands. Denies pain/nausea. Wound vac in place.  Foam dressings to pin sites and heel.  IV fluids per MD order. Labs drawn and sent.  Pt and family updated regarding POC

## 2017-04-13 NOTE — PROGRESS NOTES
Spoke to Angie in pacemaker clinic and she is aware pt noted with loop recorder. Angie stated nothing to be done for pt procedure; understanding verbalized.

## 2017-04-13 NOTE — ANESTHESIA RELEASE NOTE
"Anesthesia Release from PACU Note    Patient: Janusz Montgomery Jr.    Procedure(s) Performed: Procedure(s) (LRB):  REMOVAL OF EXTERNAL FIXATION DEVICE, LEFT ANKLE C ARM  (Left)  OPEN REDUCTION INTERNAL [id number]/FIBULA (Left)  REMOVAL-HARDWARE-LEG (Left)  DEBRIDEMENT-FOOT (Left)    Anesthesia type: general    Post pain: Adequate analgesia    Post assessment: no apparent anesthetic complications    Last Vitals:   Visit Vitals    BP (!) 143/65    Pulse 76    Temp 36.7 °C (98 °F) (Oral)    Resp 17    Ht 6' 1" (1.854 m)    Wt (!) 145.2 kg (320 lb)    SpO2 98%    BMI 42.22 kg/m2       Post vital signs: stable    Level of consciousness: awake, alert  and oriented    Nausea/Vomiting: no nausea/no vomiting    Complications: none    Airway Patency: patent    Respiratory: unassisted    Cardiovascular: stable and blood pressure at baseline    Hydration: euvolemic  "

## 2017-04-13 NOTE — BRIEF OP NOTE
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  Removal, under anesthesia, of external fixation left leg    2.  Revision fixation of left distal tibial nonunion with allograft bone grafting    3.  Non excisional debridement of left heel ulcer    Surgeon: Walter Cortés M.D.    Asst:  Nathan Garnica M.D    Anesthesia: GETA    EBL:  35cc    IVF:  1500cc crystalloid    Implants: Synthes 14 hole distal tibial plate    Specimens: None    Findings: No signs of infection.  Fibrous nonunion.     Dispo:  To PACU extubated/stable.    Dict#  [id number]

## 2017-04-13 NOTE — ANESTHESIA PROCEDURE NOTES
Popliteal    Patient location during procedure: pre-op   Block not for primary anesthetic.  Reason for block: at surgeon's request and post-op pain management   Post-op Pain Location: Left ankle  Start time: 4/13/2017 10:56 AM  Timeout: 4/13/2017 10:56 AM   End time: 4/13/2017 10:56 AM  Staffing  Anesthesiologist: RACHAEL HANSON  Resident/CRNA: JULI LEVI  Performed by: resident/CRNA   Preanesthetic Checklist  Completed: patient identified, site marked, surgical consent, pre-op evaluation, timeout performed, IV checked, risks and benefits discussed and monitors and equipment checked  Peripheral Block  Patient position: supine  Prep: ChloraPrep  Patient monitoring: heart rate, cardiac monitor, continuous pulse ox, continuous capnometry and frequent blood pressure checks  Block type: popliteal  Laterality: left  Injection technique: single shot  Needle  Needle type: Stimuplex   Needle gauge: 21 G  Needle length: 4 in  Needle localization: anatomical landmarks and ultrasound guidance   -ultrasound image captured on disc.  Assessment  Injection assessment: negative aspiration, negative parasthesia and local visualized surrounding nerve  Paresthesia pain: none  Heart rate change: no  Slow fractionated injection: yes  Medications:  Bolus administered: 30 mL of 0.5 bupivacaine  Epinephrine added: 3.75 mcg/mL (1/300,000)  Additional Notes  VSS.  [facility] RN monitoring vitals throughout procedure.  Patient tolerated procedure well.

## 2017-04-14 VITALS
SYSTOLIC BLOOD PRESSURE: 125 MMHG | DIASTOLIC BLOOD PRESSURE: 71 MMHG | TEMPERATURE: 97 F | HEIGHT: 73 IN | OXYGEN SATURATION: 92 % | RESPIRATION RATE: 16 BRPM | WEIGHT: 315 LBS | HEART RATE: 82 BPM | BODY MASS INDEX: 41.75 KG/M2

## 2017-04-14 PROCEDURE — 97161 PT EVAL LOW COMPLEX 20 MIN: CPT

## 2017-04-14 PROCEDURE — 25000003 PHARM REV CODE 250: Performed by: ORTHOPAEDIC SURGERY

## 2017-04-14 PROCEDURE — 94760 N-INVAS EAR/PLS OXIMETRY 1: CPT

## 2017-04-14 PROCEDURE — 97165 OT EVAL LOW COMPLEX 30 MIN: CPT

## 2017-04-14 PROCEDURE — 63600175 PHARM REV CODE 636 W HCPCS: Performed by: ORTHOPAEDIC SURGERY

## 2017-04-14 PROCEDURE — 27000221 HC OXYGEN, UP TO 24 HOURS

## 2017-04-14 RX ADMIN — ACETAMINOPHEN 1000 MG: 10 INJECTION, SOLUTION INTRAVENOUS at 06:04

## 2017-04-14 RX ADMIN — OXYCODONE HYDROCHLORIDE 10 MG: 5 TABLET ORAL at 06:04

## 2017-04-14 RX ADMIN — CELECOXIB 200 MG: 200 CAPSULE ORAL at 10:04

## 2017-04-14 RX ADMIN — PANTOPRAZOLE SODIUM 40 MG: 40 TABLET, DELAYED RELEASE ORAL at 10:04

## 2017-04-14 RX ADMIN — ASPIRIN 325 MG ORAL TABLET 325 MG: 325 PILL ORAL at 10:04

## 2017-04-14 RX ADMIN — OXYCODONE HYDROCHLORIDE 10 MG: 10 TABLET, FILM COATED, EXTENDED RELEASE ORAL at 10:04

## 2017-04-14 RX ADMIN — GABAPENTIN 300 MG: 300 CAPSULE ORAL at 06:04

## 2017-04-14 RX ADMIN — DOCUSATE SODIUM 100 MG: 100 CAPSULE, LIQUID FILLED ORAL at 10:04

## 2017-04-14 RX ADMIN — ATORVASTATIN CALCIUM 40 MG: 20 TABLET, FILM COATED ORAL at 10:04

## 2017-04-14 RX ADMIN — VENLAFAXINE 150 MG: 75 TABLET ORAL at 10:04

## 2017-04-14 RX ADMIN — TAMSULOSIN HYDROCHLORIDE 0.4 MG: 0.4 CAPSULE ORAL at 10:04

## 2017-04-14 RX ADMIN — OXYCODONE HYDROCHLORIDE 15 MG: 5 TABLET ORAL at 02:04

## 2017-04-14 NOTE — PLAN OF CARE
Problem: Physical Therapy Goal  Goal: Physical Therapy Goal  Goals to be met by: 17     Patient will increase functional independence with mobility by performin. Supine to sit with Modified [place]  2. Sit to supine with Modified [place]  3. Sit to stand transfer with Supervision  4. Bed to chair transfer with Supervision using LRAD  5. Gait x 10 feet with Minimal Assistance using Rolling or Standard Walker.   6. Wheelchair propulsion x 50 feet with Stand-by Assistance using bilateral uppper extremities  Outcome: Ongoing (interventions implemented as appropriate)  Evaluation complete and goals established.      Aileen Newman DPT, PT  2017

## 2017-04-14 NOTE — PLAN OF CARE
Problem: Occupational Therapy Goal  Goal: Occupational Therapy Goal  No OT goals at this time.  Outcome: Outcome(s) achieved Date Met:  04/14/17  OT evaluation completed and pt D/C from hospital at time of this writer's documentation. Pt to return home with  OT/PT.  NIK Del Rio  4/14/2017

## 2017-04-14 NOTE — PLAN OF CARE
Pt used [facility]. Ashli with [facility] is informed of discharge today and working pt up for admission. Blaze with [facility] is aware pt is discharging and will f/u with pt.    Etta Kenyon LMSW

## 2017-04-14 NOTE — PROGRESS NOTES
Daily Orthopaedic Progress Note    Janusz Montgomery Jr. is a 72 y.o. male admitted on 4/13/2017  Hospital Day: 1  Post Op Day: 1 Day Post-Op      The patient was seen and examined this morning at the bedside. SHANELL overnight. Pain very well controlled. Feels much better after ex fix removal. Afebrile.     +voiding  +flatus  +Tolerating PO  - fevers/chills     PHYSICAL EXAM:  Awake/alert/oriented x3, No acute distress, Afebrile, Vital signs stable  Good inspiratory effort with unlaboured breathing  Dressings Clean,Dry,Intact. Incisional vac left leg with good seal   Heel ulcer covered with mepilex, left heel   Palpable distal pulses  Neurovascularly intact  Compartments soft       Vitals:    04/13/17 1940 04/13/17 2340 04/14/17 0340 04/14/17 0745   BP: 133/60 (!) 146/66 130/63 112/67   BP Location: Right arm Right arm Right arm Right arm   Patient Position: Lying Lying Lying Lying   BP Method: Automatic Automatic Automatic Automatic   Pulse: 73 80 78 88   Resp: 16 16 16 16   Temp: 96.5 °F (35.8 °C) 96.2 °F (35.7 °C) 97.7 °F (36.5 °C) 97.3 °F (36.3 °C)   TempSrc: Oral Oral Oral Oral   SpO2: 96% 97% 97% (!) 92%   Weight:       Height:         I/O last 3 completed shifts:  In: 2100 [I.V.:2100]  Out: 1585 [Urine:1550; Blood:35]    Recent Labs  Lab 04/12/17  0905 04/13/17  1711   CALCIUM 9.2 8.6*    140   K 3.8 4.1   CO2 27 26    105   BUN 18 20   CREATININE 1.3 1.5*       Recent Labs  Lab 04/13/17  1711   HGB 9.9*   HCT 28.4*     No results for input(s): INR, APTT in the last 72 hours.    Invalid input(s): PT    A/P: 72 y.o. male 1 Day Post-Op s/p left revision ORIF tibia with bone grafting, left heel ulcer debridement, ex fix removal     -Continue with current pain control regimen  -Continue with current physical therapy plan, non weight bearing LLE  -incisional wound vac x 7 days. Will switch prior to discharge  -Continue with DVT prophylaxis, ASA 325mg daily   -wound care for heel ulcer. Continue home wound  care  -continue IV Vanc 1500 q 12 hrs to complete 6 week course    Dispo: Home today. Discussed case with RACHID Garnica M.D. PGY3  [facility]   Orthopaedic Surgery Resident    Attg Note:  I agree with the above assessment and plan.  Pain controlled well.  Incisional wound vac in place.  D/C today with f/u in one week.  NWB.  Resume wound care for heel ulcer.    Walter Cortés MD

## 2017-04-14 NOTE — PLAN OF CARE
Problem: Patient Care Overview  Goal: Plan of Care Review  Outcome: Ongoing (interventions implemented as appropriate)  No acute events throughout night, VS and assessment per flow sheet, patient progressing towards goals as tolerated, plan of care reviewed with Janusz Montgomery Jr. and family, all concerns addressed, will continue to monitor.

## 2017-04-14 NOTE — PT/OT/SLP EVAL
Physical Therapy  Evaluation    Janusz Montgomery Jr.   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    Past Medical History:   Diagnosis Date    NAT (acute kidney injury) 3/19/2017    ALLERGIC RHINITIS     Anemia     Anxiety     Basal cell carcinoma     forehead    Basal cell carcinoma     left eylid    Basal cell carcinoma 08/18/2014    left prox cheek    Basal cell carcinoma 9/13/13    Right anterior shoulder    Basal cell carcinoma     Chronic rhinitis 5/3/2013    Chronic rhinitis 5/3/2013    Coronary artery disease involving native coronary artery of native heart without angina pectoris s/p RCA stent     Cortical cataract of both eyes 3/18/2016    Depression     Erectile dysfunction 3/24/2014    Erectile dysfunction 3/24/2014    Essential hypertension     GERD (gastroesophageal reflux disease) 7/25/2012    Gout, arthritis     Grade III open fracture of left tibia and fibula s/p ex-fix on 7/1/16 and ORIF of left tibia on 7/15 7/6/2016    H/O: iritis     Helicobacter pylori (H. pylori) infection     Treated    Herpes simplex keratoconjunctivitis 9/30/2015    - on acyclovir - followed by opthalmology, Dr. Uribe     Herpes simplex keratoconjunctivitis 9/30/2015    - on acyclovir - followed by opthalmology, Dr. Uribe     Hyperkalemia 2/28/2017    Hyperlipidemia     Hypogonadism male     Hypogonadism male     Mixed anxiety and depressive disorder     Morbid obesity     Obstructive sleep apnea on CPAP     CPAP    Osteoarthritis of left knee 7/25/2012    Paroxysmal atrial fibrillation 7/6/2016    Primary osteoarthritis of left knee 7/25/2012    Prominent  "aorta 1/25/2016    "RESULTS: THE HEART IS MILDLY ENLARGED WITH A SLIGHTLY PROMINENT AORTA" - Xray Chest PA & Lateral 12-     Prostate cancer 2/15/2016    - followed by urology, Dr. Young     Prostate cancer 2/15/2016    - followed by urology, Dr. 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 Conteh    CATARACT EXTRACTION W/  INTRAOCULAR LENS IMPLANT Left 4/12/2016        EXTERNAL FIXATION TIBIAL FRACTURE Left 07/01/2016    ORIF TIBIA FRACTURE Left 07/15/2016    Squamous cell cancer removal x3 with Mohs surgery      TONSILLECTOMY      TOTAL KNEE ARTHROPLASTY  10/2012    trus/bx         Referring physician: Mendoza Garnica MD  Date referred to PT: 4/13/17    General Precautions: Standard, fall  Orthopedic Precautions: LLE non weight bearing   Braces:    NA     Do you have any cultural, spiritual, Roman Catholic conflicts, given your current situation?: None stated     Patient History:  Lives With: spouse  Living Arrangements: house  Home Accessibility:  (ramp in place)  Stair Railings at Home: none  Transportation Available: family or friend will provide  Living Environment Comment: Pt reports living with wife in [facility] with ramp in place. Bathroom has tub/shower combo with TTB. PTA, pt was mod (I) with transfer from bed-> WC, mod (I) for WC for functional mobility, and required assistance for some self care skills. Pt recently discharged from HHPT/OT.   Equipment Currently Used at Home: walker, rolling, wheelchair, bedside commode, bath bench, slide " board    Previous Level of Function:  Ambulation Skills: needs device  Transfer Skills: needs device  ADL Skills: needs assist  Work/Leisure Activity: needs assist    Subjective:  Communicated with RN prior to session.  Pt agreeable to PT/OT evaluation   Chief Complaint: ankle pain   Patient goals: return home     Pain Rating: 3/10   Location - Side: Left  Location - Orientation: generalized  Location: ankle  Pain Addressed: Distraction, Reposition       Objective:   Patient found with: peripheral IV, wound vac (IV not connected to monitor)     Cognitive Exam:  Oriented to: Person, Place, Time and Situation    Follows Commands/attention: Follows multistep  commands  Communication: clear/fluent  Safety awareness/insight to disability: intact    Physical Exam:  Postural examination/scapula alignment: Rounded shoulder and Head forward    Skin integrity: Thin and Dry  Edema: Mild in LLE    Sensation:   Intact    Lower Extremity Range of Motion:  Right Lower Extremity: WFL  Left Lower Extremity: WFL    Lower Extremity Strength:  Right Lower Extremity: WFL  Left Lower Extremity: did not assess secondary to pain in L ankle     Fine motor coordination:  Intact    Gross motor coordination: WFL    Functional Mobility:  Bed Mobility:  Rolling/Turning to Left: Stand by assistance, With side rail  Scooting/Bridging: Stand by Assistance  Supine to Sit: Stand by Assistance, With side rail  Sit to Supine:  (Activity did not occur )    Transfers:  Sit <> Stand Assistance: Contact Guard Assistance  Sit <> Stand Assistive Device: Rolling Walker  Bed <> Chair Technique: Stand Pivot  Bed <> Chair Assistance: Contact Guard Assistance  Bed <> Chair Assistive Device: No Assistive Device    Gait:   Gait Distance: Did not occur    Balance:   Static Sit: GOOD: Takes MODERATE challenges from all directions  Dynamic Sit: GOOD-: Maintains balance through MODERATE excursions of active trunk movement,     Static Stand: FAIR+: Takes MINIMAL  challenges from all directions  Dynamic stand: FAIR: Needs CONTACT GUARD during gait    Therapeutic Activities and Exercises:  Pt educated on role of PT and POC/goals for therapy as well as safety with mobility. Pt verbalized understanding. Pt expressed no further concerns/questions.     AM-PAC 6 CLICK MOBILITY  How much help from another person does this patient currently need?   1 = Unable, Total/Dependent Assistance  2 = A lot, Maximum/Moderate Assistance  3 = A little, Minimum/Contact Guard/Supervision  4 = None, Modified [place]/Independent    Turning over in bed (including adjusting bedclothes, sheets and blankets)?: 3  Sitting down on and standing up from a chair with arms (e.g., wheelchair, bedside commode, etc.): 3  Moving from lying on back to sitting on the side of the bed?: 3  Moving to and from a bed to a chair (including a wheelchair)?: 3  Need to walk in hospital room?: 2  Climbing 3-5 steps with a railing?: 1  Total Score: 15     AM-PAC Raw Score CMS G-Code Modifier Level of Impairment Assistance   6 % Total / Unable   7 - 9 CM 80 - 100% Maximal Assist   10 - 14 CL 60 - 80% Moderate Assist   15 - 19 CK 40 - 60% Moderate Assist   20 - 22 CJ 20 - 40% Minimal Assist   23 CI 1-20% SBA / CGA   24 CH 0% Independent/ Mod I     Patient left up in chair with all lines intact, call button in reach, RN notified and family present.    Assessment:   Janusz Montgomery Jr. is a 72 y.o. 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 Upon initial PT evaluation, pt presents with decreased endurance, weakness, impaired functional mobility, and orthopedic precautions. Pt educated on NWB on LLE during functional mobility. Pt verbalized understanding. Pt completed bed mobility with SBA using side rail and transfers with CGA. Pt would benefit from skilled PT services to address these deficits and improve return to PLOF. Anticipate d/c to HHPT.      Rehab identified problem list/impairments: Rehab identified problem list/impairments: weakness, impaired endurance, impaired self care skills, impaired functional mobilty, decreased lower extremity function, pain, decreased ROM    Rehab potential is good.    Activity tolerance: Good    Discharge recommendations: Discharge Facility/Level Of Care Needs: home health PT, home health OT     Barriers to discharge: Barriers to Discharge: None    Equipment recommendations: Equipment Needed After Discharge: none (Pt owns all necessary equipment)     GOALS:   Physical Therapy Goals        Problem: Physical Therapy Goal    Goal Priority Disciplines Outcome Goal Variances Interventions   Physical Therapy Goal     PT/OT, PT Ongoing (interventions implemented as appropriate)     Description:  Goals to be met by: 17     Patient will increase functional independence with mobility by performin. Supine to sit with Modified [place]  2. Sit to supine with Modified [place]  3. Sit to stand transfer with Supervision  4. Bed to chair transfer with Supervision using LRAD  5. Gait  x 10 feet with Minimal Assistance using Rolling or Standard Walker.   6. Wheelchair propulsion x 50 feet with Stand-by Assistance using bilateral uppper extremities                PLAN:    Patient to be seen daily to address the above listed problems via gait training, therapeutic activities, therapeutic exercises, neuromuscular re-education  Plan of Care expires: 17  Plan of Care reviewed with: patient      Aileen Newman, PT  2017

## 2017-04-14 NOTE — DISCHARGE SUMMARY
[facility]  Orthopedic Surgery  Discharge Summary      Patient Name: Janusz Montgomery Jr.  MRN: [id number]  Admission Date: 4/13/2017  Hospital Length of Stay: 0 days  Discharge Date and Time:  04/14/2017 12:43 PM  Attending Physician: Dr Walter Cortés  Discharging Provider: Mendoza Garnica MD  Primary Care Provider: Miguelina Weathers MD     HPI: 73 yo male with PMHx of grade III left open distal tibia fracture. He is s/p ORIF left tibia and subsequently developed a nonunion as well as broken hardware complicated by a heel ulcer. He was placed in a temporary external fixator. The decision was made to proceed with removal of ex fix as well as revision ORIF of the left tibia with bone grafting. No guarantees were made.     Procedure(s) (LRB):  REMOVAL OF EXTERNAL FIXATION DEVICE, LEFT ANKLE C ARM  (Left)  OPEN REDUCTION INTERNAL [id number] (Left)  REMOVAL-HARDWARE-LEG (Left)  DEBRIDEMENT-FOOT (Left)     Hospital Course:  On the day of surgery, the patient underwent a left tibia revision ORIF with bone grafting, removal of ex fix and heel ulcer debridement without complication. An incisional wound vac was placed intra-operatively. The patient was transported from the operative suite to the PACU in stable condition. The patient was then transferred to the floor for post surgical care and close monitoring. No complications were encountered. The patient's pain was well controlled on oral medications and they wanted to be discharged home. The patient was scheduled for a follow up appointment in 1 week with Silvia Leroy.  Wound care instructions were explained in detail to the patient and family.     Weight bearing status: Non weight bearing operative extremity            Consults:     Significant Diagnostic Studies: Radiology: X-Ray: left tibia, ankle. 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    Specialty:  Orthopedic Surgery    Contact information:    [address]  [facility] [id number]  [phone]          Patient Instructions:     COMMODE FOR HOME USE   Order Specific Question Answer Comments   Type: Standard    Height: 6' 1" (1.854 m)    Weight: 145.2 kg (320 lb)    Length of need (1-99 months): 6      WALKER FOR HOME USE   Order Specific Question Answer Comments   Type of Walker: Adult (5'4"-6'6")    With wheels? No    Height: 6' 1" (1.854 m)    Weight: 145.2 kg (320 lb)    Length of need (1-99 months): 6      TRANSFER TUB BENCH FOR HOME USE   Order Specific Question Answer Comments   Type of Transfer Tub Bench: Padded    Height: 6' 1" (1.854 m)    Weight: 145.2 kg (320 lb)    Length of need (1-99 months): 99      Diet general     Keep surgical extremity elevated     Ice to affected area     Weight bearing restrictions (specify)   Order Comments: Non weight bearing operative extremity     Call MD for:  temperature >100.4     Call MD for:  persistent nausea and vomiting or diarrhea     Call MD for:  severe uncontrolled pain     Call MD for:  redness, tenderness, or signs of infection (pain, swelling, redness, odor or green/yellow discharge around incision site)     Call MD for:  difficulty breathing or increased cough     Call MD for:  severe persistent headache     Call MD for:  worsening rash     Call MD for:  persistent dizziness, " light-headedness, or visual disturbances     Call MD for:  increased confusion or weakness     Change dressing (specify)   Order Comments: Keep incisional wound vac on until battery runs out. Remove when battery runs out and place island dressing on.     Wound care to address heel ulcer. Keep pressure off of heel    Cover pin sites with mepilex dressing       Medications:  Reconciled Home Medications:   Discharge Medication List as of 4/14/2017  9:07 AM      START taking these medications    Details   docusate sodium (COLACE) 100 MG capsule Take 1 capsule (100 mg total) by mouth 2 (two) times daily as needed for Constipation., Starting 4/13/2017, Until Discontinued, Print      promethazine (PHENERGAN) 12.5 MG Tab Take 1 tablet (12.5 mg total) by mouth every 6 (six) hours as needed (for nausea)., Starting 4/13/2017, Until Discontinued, Print         CONTINUE these medications which have CHANGED    Details   oxycodone-acetaminophen (PERCOCET)  mg per tablet Take 1 tablet by mouth every 4 (four) hours as needed for Pain., Starting 4/13/2017, Until Discontinued, Print         CONTINUE these medications which have NOT CHANGED    Details   artificial tears (ISOPTO TEARS) 0.5 % ophthalmic solution Place 1 drop into both eyes as needed., Starting 1/23/2017, Until Discontinued, OTC      aspirin 325 MG tablet Take 1 tablet (325 mg total) by mouth once daily., Starting 3/20/2017, Until Tue 3/20/18, OTC      atorvastatin (LIPITOR) 40 MG tablet TAKE ONE TABLET BY MOUTH ONCE DAILY, Normal      cetirizine (ZYRTEC) 10 MG tablet Take 1 tablet (10 mg total) by mouth once daily., Starting 3/20/2017, Until Tue 3/20/18, OTC      collagenase ointment Apply topically every Mon, Wed, Fri. Nursing to apply Santyl  nickel thick to wound bed and edges and cover with damp gauze (to activate santyl) daily and cover with telfa island dressing (mepore) over each pressure injury. Santyl has autolytic debrideme nt properties and will promote  healing., Starting 1/23/2017, Until Discontinued, Print      dextrose 5 % SolP 250 mL with vancomycin 1,000 mg SolR 1,500 mg Inject 1,500 mg into the vein once daily. 4/26/17=stop date., Starting 3/23/2017, Until Discontinued, No Print      furosemide (LASIX) 20 MG tablet Take 1 tablet (20 mg total) by mouth 2 (two) times daily., Starting 4/6/2017, Until Fri 4/6/18, Print      gabapentin (NEURONTIN) 300 MG capsule Take 1 capsule (300 mg total) by mouth 3 (three) times daily., Starting 12/16/2016, Until Sat 12/16/17, Normal      melatonin 3 mg Tab Take 6 mg by mouth nightly as needed (for sleep). , Until Discontinued, Historical Med      omeprazole (PRILOSEC) 20 MG capsule Take 1 capsule (20 mg total) by mouth once daily., Starting 3/28/2017, Until Discontinued, No Print      ramipril (ALTACE) 5 MG capsule TAKE ONE CAPSULE BY MOUTH ONCE DAILY, Normal      tamsulosin (FLOMAX) 0.4 mg Cp24 Take 1 capsule (0.4 mg total) by mouth once daily., Starting 12/15/2016, Until Discontinued, Normal      venlafaxine (EFFEXOR) 75 MG tablet Take 2 tablets (150 mg total) by mouth 2 (two) times daily., Starting 4/4/2016, Until Discontinued, Normal      vitamin D 1000 units Tab Take 1 tablet (1,000 Units total) by mouth once daily., Starting 8/5/2016, Until Discontinued, OTC      zinc sulfate (ZINCATE) 220 (50) mg capsule Take 1 capsule (220 mg total) by mouth once daily., Starting 1/23/2017, Until Discontinued, OTC      CYANOCOBALAMIN, VITAMIN B-12, (VITAMIN B-12 ORAL) Take 2,500 mcg by mouth once daily., Until Discontinued, Historical Med      diclofenac sodium 1 % Gel Apply 2 g topically once daily. Apply 2 g to left shoulder, Starting 1/23/2017, Until Discontinued, Normal      diphenhydrAMINE (BENADRYL) 25 mg capsule Take 1 each (25 mg total) by mouth every 6 (six) hours as needed for Itching., Starting 3/20/2017, Until Discontinued, OTC      miconazole (MICOTIN) 2 % cream Apply topically 2 (two) times daily., Starting 3/20/2017,  Until Discontinued, OTC         STOP taking these medications       acetaminophen (TYLENOL) 325 MG tablet Comments:   Reason for Stopping:               Mendoza Garnica MD  General Surgery  [facility]

## 2017-04-14 NOTE — PLAN OF CARE
[facility]    HOME HEALTH ORDERS  FACE TO FACE ENCOUNTER    Patient Name: Janusz Montgomery Jr.  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       Future Appointments  Date Time Provider Department Center   4/19/2017 10:00 AM Navdeep Guardado MD [facility] CARDIO [facility]   4/21/2017 1:40 PM PACEMAKER, ICD [facility] ARRHYTH [facility]   4/24/2017 8:00 AM LAB, [facility] [place]   4/26/2017 1:00 PM Sherita Roberts APRN, ANP [facility] ID [facility]   4/26/2017 2:40 PM Jessica Herndon MD [facility] NEPHRO [facility]   4/27/2017 2:00 PM Silvia Leroy PA-C [facility] ORTHO [facility]   4/28/2017 2:00 PM Miguelina Weathers MD [facility] FAM MED [place]   7/11/2017 9:00 AM EKG, APPT [facility] EKG [facility]   7/11/2017 9:40 AM Luis Palacio MD [facility] ARRHYTH [facility]           I have seen and examined this patient face to face today. My clinical findings that support the need for the home health skilled services and home bound status are the following:  Weakness/numbness causing balance and gait disturbance due to Fracture and Surgery making it taxing to leave home.    Allergies:  Review of patient's allergies indicates:   Allergen Reactions    Ciprofloxacin Rash     Diffuse pruritic morbilliform rash developed 3/15/2017 after dose of cipro; previously in 2/2017 he had rash/fevers after initiation of cipro    Zosyn [piperacillin-tazobactam] Anaphylaxis     Diffuse pruritic morbilliform rash developed 3/15/2017.  Then, 430am dose on 3/16 and rash worsened with SOB/tachypnea but no hypoxemia.     Bacitracin Itching  and Rash     Violaceous rash in area of topical Tx.        Diet: regular diet    Activities: Non weight bearing operative extremity      Home Health Admitting Clinician:   SN/PT to complete comprehensive assessment including routine vital signs. Instruct on disease process and s/s of complications to report to MD. Review/verify medication list sent home with the patient at time of discharge  and instruct patient/caregiver as needed. Frequency may be adjusted depending on start of care date.    Notify MD if SBP > 160 or < 90; DBP > 90 or < 50; HR > 120 or < 50; Temp > 101    Home Medical Equipment:  Walker, 3-1 bedside commode, transfer tub bench    CONSULTS:    PT to perform comprehensive assessment if performing admit visit and generate therapy plan of care. Evaluate for home safety and equipment needs; Establish/upgrade home exercise program. Perform/instruct on therapeutic exercises, gait training, transfer training, and Range of Motion.        WOUND CARE:         Foot Ulcer Location: L heel MWF: clean with normal saline, apply santyl to wound bed (nickel thick), apply kirsten on top, cover with dry gauze and secure with kerlex.    Incisional vac placed to left leg. Keep in place. Battery last for 7 days and will stop automatically. When battery runs out, remove vac and cover with island dressing.     MISCELLANEOUS CARE:     Home Infusion Therapy: SN to perform Infusion Therapy/Central Line Care.  Review Central Line Care & Central Line Flush with patient.     Administer (drug and dose): dextrose 5 % SolP 250 mL with vancomycin 1,000 mg SolR 1,500 mg, administer over 90 minutes, every 24 hours.  Last dose given: Friday, 4/14/17   Home dose due: Saturday 4/15/17 at 0900  Stop date: 4/26/17     Scrub the Hub: Prior to accessing the line, always perform a 30 second alcohol scrub  Each lumen of the central line is to be flushed at least daily with 10 mL Normal Saline and 3 mL Heparin flush (100 units/mL)  Skilled  Nurse (SN) may draw blood from IV access  Blood Draw Procedure:  - Aspirate at least 5 mL of blood  - Discard  - Obtain specimen  - Change posiflow cap  - Flush with 20 mL Normal Saline followed by a   3-5 mL Heparin flush (100 units/mL)  Central :  - Sterile dressing changes are done weekly and as needed.  - Use chlor-hexadine scrub to cleanse site, apply Biopatch to insertion site,   apply securement device dressing  - Posi-flow caps are changed weekly and after EVERY lab draw.  - If sterile gauze is under dressing to control oozing,   dressing change must be performed every 24 hours until gauze is not needed.    Medications: Review discharge medications with patient and family and provide education.      Current Discharge Medication List      START taking these medications    Details   docusate sodium (COLACE) 100 MG capsule Take 1 capsule (100 mg total) by mouth 2 (two) times daily as needed for Constipation.  Qty: 60 capsule, Refills: 0      !! oxycodone-acetaminophen (PERCOCET)  mg per tablet Take 1 tablet by mouth every 4 (four) hours as needed for Pain.  Qty: 90 tablet, Refills: 0      promethazine (PHENERGAN) 12.5 MG Tab Take 1 tablet (12.5 mg total) by mouth every 6 (six) hours as needed (for nausea).  Qty: 30 tablet, Refills: 0       !! - Potential duplicate medications found. Please discuss with provider.      CONTINUE these medications which have NOT CHANGED    Details   artificial tears (ISOPTO TEARS) 0.5 % ophthalmic solution Place 1 drop into both eyes as needed.      aspirin 325 MG tablet Take 1 tablet (325 mg total) by mouth once daily.  Refills: 0      atorvastatin (LIPITOR) 40 MG tablet TAKE ONE TABLET BY MOUTH ONCE DAILY  Qty: 90 tablet, Refills: 0      cetirizine (ZYRTEC) 10 MG tablet Take 1 tablet (10 mg total) by mouth once daily.  Refills: 0      collagenase ointment Apply topically every Mon, Wed, Fri. Nursing to apply Santyl  nickel thick to wound bed and edges and  cover with damp gauze (to activate santyl) daily and cover with telfa island dressing (mepore) over each pressure injury. Santyl has autolytic debridement properties and will promote healing.  Qty: 60 g, Refills: 0      dextrose 5 % SolP 250 mL with vancomycin 1,000 mg SolR 1,500 mg Inject 1,500 mg into the vein once daily. 4/26/17=stop date.      furosemide (LASIX) 20 MG tablet Take 1 tablet (20 mg total) by mouth 2 (two) times daily.  Qty: 10 tablet, Refills: 0      gabapentin (NEURONTIN) 300 MG capsule Take 1 capsule (300 mg total) by mouth 3 (three) times daily.  Qty: 90 capsule, Refills: 11      melatonin 3 mg Tab Take 6 mg by mouth nightly as needed (for sleep).       omeprazole (PRILOSEC) 20 MG capsule Take 1 capsule (20 mg total) by mouth once daily.  Qty: 180 capsule, Refills: 0      !! oxycodone-acetaminophen (PERCOCET)  mg per tablet Take 1 tablet by mouth every 4 to 6 hours as needed for Pain.  Qty: 60 tablet, Refills: 0      ramipril (ALTACE) 5 MG capsule TAKE ONE CAPSULE BY MOUTH ONCE DAILY  Qty: 90 capsule, Refills: 0      tamsulosin (FLOMAX) 0.4 mg Cp24 Take 1 capsule (0.4 mg total) by mouth once daily.  Qty: 90 capsule, Refills: 3    Associated Diagnoses: Urinary frequency      venlafaxine (EFFEXOR) 75 MG tablet Take 2 tablets (150 mg total) by mouth 2 (two) times daily.  Qty: 360 tablet, Refills: 0    Associated Diagnoses: Mixed anxiety and depressive disorder      vitamin D 1000 units Tab Take 1 tablet (1,000 Units total) by mouth once daily.      zinc sulfate (ZINCATE) 220 (50) mg capsule Take 1 capsule (220 mg total) by mouth once daily.      acetaminophen (TYLENOL) 325 MG tablet Take 325 mg by mouth every 6 (six) hours as needed for Pain.      CYANOCOBALAMIN, VITAMIN B-12, (VITAMIN B-12 ORAL) Take 2,500 mcg by mouth once daily.      diclofenac sodium 1 % Gel Apply 2 g topically once daily. Apply 2 g to left shoulder  Qty: 1 Tube, Refills: 0      diphenhydrAMINE (BENADRYL) 25 mg capsule  Take 1 each (25 mg total) by mouth every 6 (six) hours as needed for Itching.  Refills: 0      miconazole (MICOTIN) 2 % cream Apply topically 2 (two) times daily.  Refills: 0       !! - Potential duplicate medications found. Please discuss with provider.          I certify that this patient is confined to his home and needs intermittent skilled nursing care and physical therapy.

## 2017-04-17 ENCOUNTER — TELEPHONE (OUTPATIENT)
Dept: ADMINISTRATIVE | Facility: CLINIC | Age: 72
End: 2017-04-17

## 2017-04-17 ENCOUNTER — LAB VISIT (OUTPATIENT)
Dept: LAB | Facility: HOSPITAL | Age: 72
End: 2017-04-17
Attending: INTERNAL MEDICINE
Payer: MEDICARE

## 2017-04-17 ENCOUNTER — TELEPHONE (OUTPATIENT)
Dept: INFECTIOUS DISEASES | Facility: CLINIC | Age: 72
End: 2017-04-17

## 2017-04-17 ENCOUNTER — TELEPHONE (OUTPATIENT)
Dept: ORTHOPEDICS | Facility: CLINIC | Age: 72
End: 2017-04-17

## 2017-04-17 DIAGNOSIS — T84.7XXA INFECTION/INFLAMMATION-INTERNAL ORTHO DEVICE: Primary | ICD-10-CM

## 2017-04-17 LAB
ALBUMIN SERPL BCP-MCNC: 2.8 G/DL
ALP SERPL-CCNC: 108 U/L
ALT SERPL W/O P-5'-P-CCNC: 35 U/L
ANION GAP SERPL CALC-SCNC: 7 MMOL/L
AST SERPL-CCNC: 28 U/L
BASOPHILS # BLD AUTO: 0.06 K/UL
BASOPHILS NFR BLD: 0.6 %
BILIRUB SERPL-MCNC: 0.6 MG/DL
BUN SERPL-MCNC: 18 MG/DL
CALCIUM SERPL-MCNC: 9.1 MG/DL
CHLORIDE SERPL-SCNC: 104 MMOL/L
CO2 SERPL-SCNC: 28 MMOL/L
CREAT SERPL-MCNC: 1.3 MG/DL
CRP SERPL-MCNC: 106.6 MG/L
DIFFERENTIAL METHOD: ABNORMAL
EOSINOPHIL # BLD AUTO: 0.7 K/UL
EOSINOPHIL NFR BLD: 7.6 %
ERYTHROCYTE [DISTWIDTH] IN BLOOD BY AUTOMATED COUNT: 13.9 %
ERYTHROCYTE [SEDIMENTATION RATE] IN BLOOD BY WESTERGREN METHOD: >140 MM/HR
EST. GFR  (AFRICAN AMERICAN): >60 ML/MIN/1.73 M^2
EST. GFR  (NON AFRICAN AMERICAN): 55 ML/MIN/1.73 M^2
GLUCOSE SERPL-MCNC: 95 MG/DL
HCT VFR BLD AUTO: 28.8 %
HGB BLD-MCNC: 9.6 G/DL
LYMPHOCYTES # BLD AUTO: 2 K/UL
LYMPHOCYTES NFR BLD: 21.6 %
MCH RBC QN AUTO: 31.2 PG
MCHC RBC AUTO-ENTMCNC: 33.3 %
MCV RBC AUTO: 94 FL
MONOCYTES # BLD AUTO: 0.6 K/UL
MONOCYTES NFR BLD: 6.4 %
NEUTROPHILS # BLD AUTO: 6 K/UL
NEUTROPHILS NFR BLD: 63.7 %
PLATELET # BLD AUTO: 266 K/UL
PMV BLD AUTO: 10.3 FL
POTASSIUM SERPL-SCNC: 3.8 MMOL/L
PROT SERPL-MCNC: 6.6 G/DL
RBC # BLD AUTO: 3.08 M/UL
SODIUM SERPL-SCNC: 139 MMOL/L
VANCOMYCIN TROUGH SERPL-MCNC: 9.8 UG/ML
WBC # BLD AUTO: 9.35 K/UL

## 2017-04-17 PROCEDURE — 80202 ASSAY OF VANCOMYCIN: CPT

## 2017-04-17 PROCEDURE — 80053 COMPREHEN METABOLIC PANEL: CPT

## 2017-04-17 PROCEDURE — 85025 COMPLETE CBC W/AUTO DIFF WBC: CPT

## 2017-04-17 PROCEDURE — 85651 RBC SED RATE NONAUTOMATED: CPT

## 2017-04-17 PROCEDURE — 36415 COLL VENOUS BLD VENIPUNCTURE: CPT

## 2017-04-17 PROCEDURE — 86140 C-REACTIVE PROTEIN: CPT

## 2017-04-17 NOTE — TELEPHONE ENCOUNTER
Left voice mail for pt's wife, Bri to return my call.  Scheduled pt to see Sabrina Casey NP 4/21/2017 for wound vac removal

## 2017-04-17 NOTE — TELEPHONE ENCOUNTER
ID lab follow up:    WBC 9.35  ESR > 140     Creatinine 1.3.   Vancomycin trough 9.8.    Patient with recent removal of external fixator and left tibia revision ORIF with bone grafting, placement of wound vac and heel ulcer debridement.   Inflammatory marker elevation may be related to this.  Called patient.  He reports his pain has decreased, requiring less pain medication.  No fevers, chills, sweats.  No complaints.     Vancomycin trough low.  May have missed dose of vancomycin.  Currently on 1.5 grams IV q 24 hours.  Will re-check level on Wednesday as well as inflammatory markers.  Has been having problems drawing blood through PICC, though reports no problems infusing medication.  Coming in for possible cathflo on Wednesday.      Instructed to call for any fevers, chills or other concerns. They have my contact information.

## 2017-04-17 NOTE — OP NOTE
DATE OF PROCEDURE:  04/13/2017.    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    PROCEDURES PERFORMED:  1.  Removal under anesthesia of external fixation, left leg.  2.  Revision fixation of left distal tibial nonunion with allograft bone   grafting.  3.  Non-excisional debridement, left heel ulcer.  4.  Advancement flaps to left medial distal tibia, 8 cm.    SURGEON:  Walter Cortés M.D.    ASSISTANT:  Mendoza Garnica M.D. (RES).    ANESTHESIA:  General endotracheal.    ESTIMATED BLOOD LOSS:  35 mL.    IV FLUIDS:  1500 mL crystalloid.    IMPLANTS:  Synthes 14-hole distal tibial plate, bone morphogenic protein-2 and   30 mL of cancellous bone chips and 10 mL of demineralized bone matrix.    INDICATIONS FOR PROCEDURE:  The patient is a 72-year-old male who underwent open   reduction internal fixation of his left tibia open fracture approximately six   months ago.  The patient subsequently, upon weightbearing after three months,   broke his plate and drifted into valgus.  He, at that point, was taken back to   the Operating Room for external fixation as he had a large heel ulcer.  He has   been undergoing outpatient wound therapy for the heel ulcer, which he is doing   very well and near closing.  The patient's external fixator is now loose and his   heel ulcer looking better.  I am taking him back to the Operating Room for   revision fixation.  The risks, benefits, and alternatives to surgery were   discussed with the patient prior to going to the Operating Room.  Informed   consent was obtained.    PROCEDURE IN DETAIL:  The patient was identified in the preoperative holding   area and the site was marked.  The patient was wheeled into the Operating Room   and placed on the operating table in supine position.  General  endotracheal   anesthesia was induced and preoperative antibiotics were administered.  A   timeout was undertaken to confirm patient, site, surgery, surgeon and   administration of preoperative antibiotics.  All agreed and we proceeded.    Prior to prepping and draping, I removed all the bars and the pins from the   external fixator in both the foot and tibia.  I then cleansed these holes with   alcohol.  The patient had a heel ulcer, which had a small amount eschar, which   was minimal.  I did a non-excisional debridement of just the eschar layer on the   outside with a scrub sponge and was able to get to healthy beefy tissue.  At   this point, the lower extremity was prepped and draped in sterile fashion.    While the leg was still in the air, I took a piece of [place] and placed this over   the heel ulcer and then again re-prepped the lower extremity.    At this point, I incised through the medial incision after raising the   tourniquet.  I dissected down to the level of the prior plate.  I had to carry   my dissection a bit more proximally.  I sharply excised some of the scar from   around the plate.  I then opened up the proximal holes where the prior screws   had been placed.  I removed all the locking screws from distally and was able to   lever the plate off the bone.  I then removed the proximal screws and then,   using a combination of osteotome and a [place], was able to remove the plate from   proximally and slide this out distally.    At this point, I dissected into the fracture site and found no evidence of   infection, but I did find fibrous tissue.  I sharply excised all the fibrous   tissue from within the fracture site.  At this point, after doing this, I   curetted the entirety of the inside of the fracture and used an osteotome to   create some surrounding bleeding and then used drills to make drill holes.  I   drilled out the lateral side into the fibula as well at the fibular nonunion   site.  I  again copiously irrigated with normal saline solution.  At this point,   I had a very good bleeding fracture bed.  At this point, I elected to proceed   with placing some of the bone graft before I covered up the access to the tibia   where the plate was.  I placed the medium bone morphogenic 2 Infuse sponge, one   sponge proximal and one sponge distal, after compacting some of the distal bone   into the metaphysis.  I then placed a sponge laterally on the lateral cortex.  I   then filled the canal with cancellous bone graft and used this in a packed   manner with a tamp proximally, distally and toward the lateral side.  I then   contained the entirety of this with 10 mL of bone morphogenic protein.  At this   point, I replaced the 14-hole plate, removed this a little bit more proximal   than the last time, took the patient out of valgus, and I fixed this distally   with a single cortical screw to the central hole, and then after ensuring my   rotation was correct and my length and alignment, I placed a single cortical   screw in a separate hole than was used last time proximally through a small stab   incision.  I checked again and had good AP and lateral alignment with good   alignment of the fibula.  I then placed several more cortical screws proximally,   avoiding the prior used holes.  I then placed all the locking screws distally,   removed the cancellous screws I placed in the central hole and replaced these   with a lock screw.  I now had very good alignment and good fixation on the   tibia.    At this point, I elected to try and place a [place] madonna up the lateral side to   maintain some sort of stability on the lateral side, but could not dissect down   to the fracture site because the old healed open injury came with this at a   tangential angle and I was afraid of tissue necrosis.  I made a small stab   incision distally and drilled a hole at the tip of the fibula and then placed a   [place] madonna in the bone.  I  drilled this several times up toward the fracture site   at the fibula.  I was able to get to the proximal fibular shaft, but never able   to gain access into the shaft itself.  After trying this several times, I   abandoned the idea, but I felt that we could gain some success from getting the   Rush madonna all the way up into the fracture site multiple times and multiple   planes, again causing bleeding at that fracture site in order to stimulate bone   healing.    Wounds were copiously irrigated with normal saline solution.  I advanced flaps   proximally and distally in order to close the wounds in a fasciocutaneous   manner, pulled these down over the plate and was able to gain a tension-free   closure after dissecting in order to gain these flaps.  The deep fascia was   closed with 0-Vicryl suture, the subcutaneous tissue with 2-0 Vicryl suture and   the skin with 3-0 nylon suture in vertical mattress fashion.  The lateral   incision was closed with 2-0 Vicryl suture in the subcutaneous tissue and 3-0   nylon suture in the skin.  Sterile dressings were applied, and at this point, I   elected not to place him in any type of splint because I wanted to avoid any   pressure on the heel ulcer.  The heel ulcer was dressed with Fabienne and a   Mepilex.  The external fixator pin sites were also dressed with Mepilex.  I   placed an incisional wound VAC over the entirety of the medial incisions for the   surgery in order to get more blood flow at skin.    All instrument and sponge counts were reported correct at the end of the case.    There were no complications.  The patient was extubated, awakened and taken to   Recovery in stable condition.    PLAN FOR THE PATIENT:  He will be nonweightbearing likely for 10 to 12 weeks   pending fracture healing      DIMITRY  dd: 04/17/2017 10:53:28 (CDT)  td: 04/17/2017 12:39:49 (CDT)  Doc ID   #[id number]  Job ID #[id number]    CC:

## 2017-04-18 ENCOUNTER — TELEPHONE (OUTPATIENT)
Dept: ORTHOPEDICS | Facility: CLINIC | Age: 72
End: 2017-04-18

## 2017-04-18 NOTE — PT/OT/SLP DISCHARGE
Physical Therapy Discharge Summary    Janusz Montgomery Jr.  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  Please refer to prior PT noted date on 14 for functional status.     Assessment:   Patient appropriate for care in another setting.  GOALS:   Physical Therapy Goals     Not on file      Multidisciplinary Problems (Resolved)        Problem: Physical Therapy Goal    Goal Priority Disciplines Outcome Goal Variances Interventions   Physical Therapy Goal   (Resolved)     PT/OT, PT Outcome(s) achieved     Description:  Goals to be met by: 17     Patient will increase functional independence with mobility by performin. Supine to sit with Modified [place]  2. Sit to supine with Modified [place]  3. Sit to stand transfer with Supervision  4. Bed to chair transfer with Supervision using LRAD  5. Gait  x 10 feet with Minimal Assistance using Rolling or Standard Walker.   6. Wheelchair propulsion x 50 feet with Stand-by Assistance using bilateral uppper extremities              Reasons for Discontinuation of Therapy Services  Transfer to alternate level of care.      Plan:  Patient Discharged to: Home with Home Health Service.    Aileen Newman DPT, PT  2017

## 2017-04-19 ENCOUNTER — INFUSION (OUTPATIENT)
Dept: INFECTIOUS DISEASES | Facility: HOSPITAL | Age: 72
End: 2017-04-19
Attending: INTERNAL MEDICINE
Payer: MEDICARE

## 2017-04-19 ENCOUNTER — OFFICE VISIT (OUTPATIENT)
Dept: CARDIOLOGY | Facility: CLINIC | Age: 72
End: 2017-04-19
Payer: MEDICARE

## 2017-04-19 VITALS
OXYGEN SATURATION: 97 % | DIASTOLIC BLOOD PRESSURE: 51 MMHG | HEIGHT: 73 IN | HEART RATE: 72 BPM | SYSTOLIC BLOOD PRESSURE: 105 MMHG

## 2017-04-19 DIAGNOSIS — G47.33 OBSTRUCTIVE SLEEP APNEA ON CPAP: ICD-10-CM

## 2017-04-19 DIAGNOSIS — S82.202N TRAUMATIC TYPE III OPEN FRACTURE OF SHAFT OF LEFT TIBIA AND FIBULA WITH NONUNION: ICD-10-CM

## 2017-04-19 DIAGNOSIS — S82.402N TRAUMATIC TYPE III OPEN FRACTURE OF SHAFT OF LEFT TIBIA AND FIBULA WITH NONUNION: ICD-10-CM

## 2017-04-19 DIAGNOSIS — I48.0 PAROXYSMAL ATRIAL FIBRILLATION: ICD-10-CM

## 2017-04-19 DIAGNOSIS — I25.10 CORONARY ARTERY DISEASE INVOLVING NATIVE CORONARY ARTERY OF NATIVE HEART WITHOUT ANGINA PECTORIS: Primary | ICD-10-CM

## 2017-04-19 DIAGNOSIS — T82.898A OCCLUDED PICC LINE, INITIAL ENCOUNTER: Primary | ICD-10-CM

## 2017-04-19 DIAGNOSIS — E66.01 MORBID OBESITY WITH BMI OF 40.0-44.9, ADULT: ICD-10-CM

## 2017-04-19 DIAGNOSIS — T80.211A BLOODSTREAM INFECTION ASSOCIATED WITH CENTRAL VENOUS CATHETER, INITIAL ENCOUNTER: Primary | ICD-10-CM

## 2017-04-19 DIAGNOSIS — I10 ESSENTIAL HYPERTENSION: ICD-10-CM

## 2017-04-19 DIAGNOSIS — E78.5 HYPERLIPIDEMIA, UNSPECIFIED HYPERLIPIDEMIA TYPE: ICD-10-CM

## 2017-04-19 PROCEDURE — 1157F ADVNC CARE PLAN IN RCRD: CPT | Mod: S$GLB,,, | Performed by: INTERNAL MEDICINE

## 2017-04-19 PROCEDURE — 1160F RVW MEDS BY RX/DR IN RCRD: CPT | Mod: S$GLB,,, | Performed by: INTERNAL MEDICINE

## 2017-04-19 PROCEDURE — 99499 UNLISTED E&M SERVICE: CPT | Mod: S$GLB,,, | Performed by: INTERNAL MEDICINE

## 2017-04-19 PROCEDURE — 63600175 PHARM REV CODE 636 W HCPCS: Performed by: INTERNAL MEDICINE

## 2017-04-19 PROCEDURE — 3078F DIAST BP <80 MM HG: CPT | Mod: S$GLB,,, | Performed by: INTERNAL MEDICINE

## 2017-04-19 PROCEDURE — 1159F MED LIST DOCD IN RCRD: CPT | Mod: S$GLB,,, | Performed by: INTERNAL MEDICINE

## 2017-04-19 PROCEDURE — 1126F AMNT PAIN NOTED NONE PRSNT: CPT | Mod: S$GLB,,, | Performed by: INTERNAL MEDICINE

## 2017-04-19 PROCEDURE — 3074F SYST BP LT 130 MM HG: CPT | Mod: S$GLB,,, | Performed by: INTERNAL MEDICINE

## 2017-04-19 PROCEDURE — 99999 PR PBB SHADOW E&M-EST. PATIENT-LVL III: CPT | Mod: PBBFAC,,, | Performed by: INTERNAL MEDICINE

## 2017-04-19 PROCEDURE — 99214 OFFICE O/P EST MOD 30 MIN: CPT | Mod: S$GLB,,, | Performed by: INTERNAL MEDICINE

## 2017-04-19 RX ADMIN — ALTEPLASE 2 MG: 2.2 INJECTION, POWDER, LYOPHILIZED, FOR SOLUTION INTRAVENOUS at 10:04

## 2017-04-19 NOTE — LETTER
April 19, 2017      Yee Villafuerte, FN  [address]  [facility] [facility] [place] [facility] [address]           [facility]  [address]  [facility] [address]  Phone: [phone]          Patient: Janusz Montgomery Jr.   MR Number: [id number]   YOB: 1945   Date of Visit: 4/19/2017       Dear Yee Villafuerte:    Thank you for referring Janusz Montgomery to me for evaluation. Attached you will find relevant portions of my assessment and plan of care.    If you have questions, please do not hesitate to call me. I look forward to following Janusz Montgomery along with you.    Sincerely,    Navdeep Guardado MD    Enclosure  CC:  No Recipients    If you would like to receive this communication electronically, please contact [email or web address] or [phone] to request more information on [employer] access.    For providers and/or their staff who would like to refer a patient to [facility], please contact us through our one-stop-shop provider referral line, [facility], at [phone].    If you feel you have received this communication in error or would no longer like to receive these types of communications, please e-mail [email or web address]
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
## 2017-04-20 ENCOUNTER — OUTPATIENT CASE MANAGEMENT (OUTPATIENT)
Dept: ADMINISTRATIVE | Facility: OTHER | Age: 72
End: 2017-04-20

## 2017-04-20 ENCOUNTER — TELEPHONE (OUTPATIENT)
Dept: INFECTIOUS DISEASES | Facility: HOSPITAL | Age: 72
End: 2017-04-20

## 2017-04-20 DIAGNOSIS — Z51.81 THERAPEUTIC DRUG MONITORING: ICD-10-CM

## 2017-04-20 DIAGNOSIS — Z51.81 THERAPEUTIC DRUG MONITORING: Primary | ICD-10-CM

## 2017-04-20 DIAGNOSIS — L03.116 CELLULITIS OF LEFT LOWER EXTREMITY: Primary | ICD-10-CM

## 2017-04-20 NOTE — PROGRESS NOTES
Follow up with pt. Pt has ortho appt tomorrow. He states plan is to d/c wound vac. Pt has [facility]. Pt to resume on Monday. Pt states pt is controlled. He states he rarely has to take anything for pain anymore. Pt is getting VAncomycin once daily. Mrs. Montgomery is administering. She states it's going fine. They had problems with a clotted line last week. The problem is resolved. Will place pt in monitoring status and follow up in 3 weeks.

## 2017-04-20 NOTE — TELEPHONE ENCOUNTER
Returned HH call and discussed with patient.  Needs vancomycin trough and bmp tomorrow but will be coming to clinic for 0930 appt.  Patient will come to infusion center for blood work at 0845 tomorrow and start infusion after that.

## 2017-04-21 ENCOUNTER — TELEPHONE (OUTPATIENT)
Dept: INFECTIOUS DISEASES | Facility: CLINIC | Age: 72
End: 2017-04-21

## 2017-04-21 ENCOUNTER — CLINICAL SUPPORT (OUTPATIENT)
Dept: ELECTROPHYSIOLOGY | Facility: CLINIC | Age: 72
End: 2017-04-21
Payer: MEDICARE

## 2017-04-21 ENCOUNTER — OFFICE VISIT (OUTPATIENT)
Dept: ORTHOPEDICS | Facility: CLINIC | Age: 72
End: 2017-04-21
Payer: MEDICARE

## 2017-04-21 ENCOUNTER — INFUSION (OUTPATIENT)
Dept: INFECTIOUS DISEASES | Facility: HOSPITAL | Age: 72
End: 2017-04-21
Attending: INTERNAL MEDICINE
Payer: MEDICARE

## 2017-04-21 VITALS
DIASTOLIC BLOOD PRESSURE: 61 MMHG | HEART RATE: 75 BPM | SYSTOLIC BLOOD PRESSURE: 109 MMHG | BODY MASS INDEX: 41.75 KG/M2 | TEMPERATURE: 98 F | HEIGHT: 73 IN | RESPIRATION RATE: 17 BRPM | WEIGHT: 315 LBS

## 2017-04-21 DIAGNOSIS — Z51.81 THERAPEUTIC DRUG MONITORING: ICD-10-CM

## 2017-04-21 DIAGNOSIS — I48.0 PAF (PAROXYSMAL ATRIAL FIBRILLATION): ICD-10-CM

## 2017-04-21 DIAGNOSIS — Z51.89 VISIT FOR WOUND CHECK: Primary | ICD-10-CM

## 2017-04-21 DIAGNOSIS — Z95.818 STATUS POST PLACEMENT OF IMPLANTABLE LOOP RECORDER: ICD-10-CM

## 2017-04-21 LAB
ANION GAP SERPL CALC-SCNC: 8 MMOL/L
BUN SERPL-MCNC: 18 MG/DL
CALCIUM SERPL-MCNC: 9.4 MG/DL
CHLORIDE SERPL-SCNC: 104 MMOL/L
CO2 SERPL-SCNC: 27 MMOL/L
CREAT SERPL-MCNC: 1.2 MG/DL
CRP SERPL-MCNC: 27.7 MG/L
EST. GFR  (AFRICAN AMERICAN): >60 ML/MIN/1.73 M^2
EST. GFR  (NON AFRICAN AMERICAN): >60 ML/MIN/1.73 M^2
GLUCOSE SERPL-MCNC: 90 MG/DL
POTASSIUM SERPL-SCNC: 4.2 MMOL/L
SODIUM SERPL-SCNC: 139 MMOL/L
VANCOMYCIN TROUGH SERPL-MCNC: 10.7 UG/ML

## 2017-04-21 PROCEDURE — 99024 POSTOP FOLLOW-UP VISIT: CPT | Mod: S$GLB,,, | Performed by: NURSE PRACTITIONER

## 2017-04-21 PROCEDURE — 93291 INTERROG DEV EVAL SCRMS IP: CPT | Mod: S$GLB,,, | Performed by: INTERNAL MEDICINE

## 2017-04-21 PROCEDURE — 99496 TRANSJ CARE MGMT HIGH F2F 7D: CPT | Mod: S$GLB,,, | Performed by: NURSE PRACTITIONER

## 2017-04-21 PROCEDURE — 99999 PR PBB SHADOW E&M-EST. PATIENT-LVL V: CPT | Mod: PBBFAC,,, | Performed by: NURSE PRACTITIONER

## 2017-04-21 NOTE — TELEPHONE ENCOUNTER
ID note:    Repeat vancomycin trough 10.1 on 1.5 grams q 24 hours.   Inflammatory markers continue to trend down.   Creatinine 1.2     Increase to 1.75 q 24 hours.  May need to infuse over 2 hours instead of 90 minutes.   Order given to [facility] who will discuss with patient.   Repeat labs/trough on Monday 4/24.   Estimated end date 4/28

## 2017-04-24 ENCOUNTER — LAB VISIT (OUTPATIENT)
Dept: LAB | Facility: HOSPITAL | Age: 72
End: 2017-04-24
Attending: NURSE PRACTITIONER
Payer: MEDICARE

## 2017-04-24 DIAGNOSIS — T84.7XXA INFECTION/INFLAMMATION-INTERNAL ORTHO DEVICE: Primary | ICD-10-CM

## 2017-04-24 LAB
ALBUMIN SERPL BCP-MCNC: 3.4 G/DL
ALP SERPL-CCNC: 154 U/L
ALT SERPL W/O P-5'-P-CCNC: 46 U/L
ANION GAP SERPL CALC-SCNC: 9 MMOL/L
AST SERPL-CCNC: 25 U/L
BASOPHILS # BLD AUTO: 0.09 K/UL
BASOPHILS NFR BLD: 0.9 %
BILIRUB SERPL-MCNC: 0.7 MG/DL
BUN SERPL-MCNC: 19 MG/DL
CALCIUM SERPL-MCNC: 9.1 MG/DL
CHLORIDE SERPL-SCNC: 104 MMOL/L
CO2 SERPL-SCNC: 27 MMOL/L
CREAT SERPL-MCNC: 1.2 MG/DL
CRP SERPL-MCNC: 18.8 MG/L
DIFFERENTIAL METHOD: ABNORMAL
EOSINOPHIL # BLD AUTO: 0.7 K/UL
EOSINOPHIL NFR BLD: 7 %
ERYTHROCYTE [DISTWIDTH] IN BLOOD BY AUTOMATED COUNT: 14 %
ERYTHROCYTE [SEDIMENTATION RATE] IN BLOOD BY WESTERGREN METHOD: 77 MM/HR
EST. GFR  (AFRICAN AMERICAN): >60 ML/MIN/1.73 M^2
EST. GFR  (NON AFRICAN AMERICAN): >60 ML/MIN/1.73 M^2
GLUCOSE SERPL-MCNC: 86 MG/DL
HCT VFR BLD AUTO: 32.2 %
HGB BLD-MCNC: 10.6 G/DL
LYMPHOCYTES # BLD AUTO: 2.1 K/UL
LYMPHOCYTES NFR BLD: 19.9 %
MCH RBC QN AUTO: 30.7 PG
MCHC RBC AUTO-ENTMCNC: 32.9 %
MCV RBC AUTO: 93 FL
MONOCYTES # BLD AUTO: 0.7 K/UL
MONOCYTES NFR BLD: 6.4 %
NEUTROPHILS # BLD AUTO: 7 K/UL
NEUTROPHILS NFR BLD: 65.7 %
PLATELET # BLD AUTO: 364 K/UL
PMV BLD AUTO: 9.8 FL
POTASSIUM SERPL-SCNC: 4.2 MMOL/L
PROT SERPL-MCNC: 7.4 G/DL
RBC # BLD AUTO: 3.45 M/UL
SODIUM SERPL-SCNC: 140 MMOL/L
VANCOMYCIN TROUGH SERPL-MCNC: 12.4 UG/ML
WBC # BLD AUTO: 10.57 K/UL

## 2017-04-24 PROCEDURE — 85651 RBC SED RATE NONAUTOMATED: CPT

## 2017-04-24 PROCEDURE — 80202 ASSAY OF VANCOMYCIN: CPT

## 2017-04-24 PROCEDURE — 36415 COLL VENOUS BLD VENIPUNCTURE: CPT

## 2017-04-24 PROCEDURE — 86140 C-REACTIVE PROTEIN: CPT

## 2017-04-24 PROCEDURE — 85025 COMPLETE CBC W/AUTO DIFF WBC: CPT

## 2017-04-24 PROCEDURE — 80053 COMPREHEN METABOLIC PANEL: CPT

## 2017-04-24 NOTE — PROGRESS NOTES
Incisional wound vac removed from incision. Sutures in place with no surrounding erythema or drainage. Heel dressing in place which his wife states she is changing daily. Mepilex dressing placed over incision and will be changed every 3 days. Pt's wife changed heel dressing according to protocol she is currently following. He will f/u as scheduled or sooner as needed.

## 2017-04-25 ENCOUNTER — TELEPHONE (OUTPATIENT)
Dept: ORTHOPEDICS | Facility: CLINIC | Age: 72
End: 2017-04-25

## 2017-04-25 ENCOUNTER — LAB VISIT (OUTPATIENT)
Dept: LAB | Facility: HOSPITAL | Age: 72
End: 2017-04-25
Attending: INTERNAL MEDICINE
Payer: MEDICARE

## 2017-04-25 DIAGNOSIS — E78.5 HYPERLIPIDEMIA, UNSPECIFIED HYPERLIPIDEMIA TYPE: ICD-10-CM

## 2017-04-25 DIAGNOSIS — N17.9 AKI (ACUTE KIDNEY INJURY): ICD-10-CM

## 2017-04-25 DIAGNOSIS — E55.9 VITAMIN D DEFICIENCY DISEASE: ICD-10-CM

## 2017-04-25 LAB
25(OH)D3+25(OH)D2 SERPL-MCNC: 38 NG/ML
ALBUMIN SERPL BCP-MCNC: 3.3 G/DL
ANION GAP SERPL CALC-SCNC: 15 MMOL/L
BUN SERPL-MCNC: 17 MG/DL
CALCIUM SERPL-MCNC: 9.9 MG/DL
CHLORIDE SERPL-SCNC: 106 MMOL/L
CHOLEST/HDLC SERPL: 5.8 {RATIO}
CO2 SERPL-SCNC: 21 MMOL/L
CREAT SERPL-MCNC: 1.3 MG/DL
EST. GFR  (AFRICAN AMERICAN): >60 ML/MIN/1.73 M^2
EST. GFR  (NON AFRICAN AMERICAN): 55.3 ML/MIN/1.73 M^2
GLUCOSE SERPL-MCNC: 94 MG/DL
HDL/CHOLESTEROL RATIO: 17.3 %
HDLC SERPL-MCNC: 133 MG/DL
HDLC SERPL-MCNC: 23 MG/DL
LDLC SERPL CALC-MCNC: 82.6 MG/DL
NONHDLC SERPL-MCNC: 110 MG/DL
PHOSPHATE SERPL-MCNC: 3.7 MG/DL
POTASSIUM SERPL-SCNC: 4.8 MMOL/L
SODIUM SERPL-SCNC: 142 MMOL/L
TRIGL SERPL-MCNC: 137 MG/DL

## 2017-04-25 PROCEDURE — 80061 LIPID PANEL: CPT

## 2017-04-25 PROCEDURE — 82306 VITAMIN D 25 HYDROXY: CPT

## 2017-04-25 PROCEDURE — 80069 RENAL FUNCTION PANEL: CPT

## 2017-04-25 PROCEDURE — 36415 COLL VENOUS BLD VENIPUNCTURE: CPT | Mod: PO

## 2017-04-25 NOTE — TELEPHONE ENCOUNTER
----- Message from Cy Kirk sent at 4/25/2017 12:04 PM CDT -----  Contact: Joanie/[facility]   Joanie would like to speak with you regarding when the pt is able to begin therapy. Joanie can be reached at [phone].

## 2017-04-25 NOTE — TELEPHONE ENCOUNTER
Spoke with Marzena with [facility].   Advised that pt is still NWB but may do ROM at tolerated per Dr Cortés.

## 2017-04-26 ENCOUNTER — OFFICE VISIT (OUTPATIENT)
Dept: INFECTIOUS DISEASES | Facility: CLINIC | Age: 72
End: 2017-04-26
Payer: MEDICARE

## 2017-04-26 ENCOUNTER — OFFICE VISIT (OUTPATIENT)
Dept: NEPHROLOGY | Facility: CLINIC | Age: 72
End: 2017-04-26
Payer: MEDICARE

## 2017-04-26 VITALS
HEART RATE: 75 BPM | BODY MASS INDEX: 41.75 KG/M2 | DIASTOLIC BLOOD PRESSURE: 66 MMHG | HEIGHT: 73 IN | SYSTOLIC BLOOD PRESSURE: 110 MMHG | WEIGHT: 315 LBS | OXYGEN SATURATION: 99 %

## 2017-04-26 VITALS
DIASTOLIC BLOOD PRESSURE: 63 MMHG | HEIGHT: 73 IN | TEMPERATURE: 98 F | WEIGHT: 315 LBS | HEART RATE: 69 BPM | BODY MASS INDEX: 41.75 KG/M2 | SYSTOLIC BLOOD PRESSURE: 104 MMHG

## 2017-04-26 DIAGNOSIS — L97.529 NON-PRESSURE CHRONIC ULCER OF OTHER PART OF LEFT FOOT WITH UNSPECIFIED SEVERITY: ICD-10-CM

## 2017-04-26 DIAGNOSIS — D63.1 ANEMIA OF CHRONIC RENAL FAILURE, UNSPECIFIED STAGE: ICD-10-CM

## 2017-04-26 DIAGNOSIS — N18.3 CHRONIC KIDNEY DISEASE (CKD), STAGE 3 (MODERATE): Primary | ICD-10-CM

## 2017-04-26 DIAGNOSIS — Z79.2 RECEIVING INTRAVENOUS ANTIBIOTIC TREATMENT AS OUTPATIENT: ICD-10-CM

## 2017-04-26 DIAGNOSIS — S82.202N TRAUMATIC TYPE III OPEN FRACTURE OF SHAFT OF LEFT TIBIA AND FIBULA WITH NONUNION: ICD-10-CM

## 2017-04-26 DIAGNOSIS — N18.9 ANEMIA OF CHRONIC RENAL FAILURE, UNSPECIFIED STAGE: ICD-10-CM

## 2017-04-26 DIAGNOSIS — L03.116 CELLULITIS OF LEFT LOWER EXTREMITY: Primary | ICD-10-CM

## 2017-04-26 DIAGNOSIS — S82.402N TRAUMATIC TYPE III OPEN FRACTURE OF SHAFT OF LEFT TIBIA AND FIBULA WITH NONUNION: ICD-10-CM

## 2017-04-26 LAB — CRP SERPL-MCNC: 14.6 MG/L

## 2017-04-26 PROCEDURE — 1126F AMNT PAIN NOTED NONE PRSNT: CPT | Mod: GC,S$GLB,, | Performed by: INTERNAL MEDICINE

## 2017-04-26 PROCEDURE — 99999 PR PBB SHADOW E&M-EST. PATIENT-LVL V: CPT | Mod: PBBFAC,,, | Performed by: NURSE PRACTITIONER

## 2017-04-26 PROCEDURE — 99499 UNLISTED E&M SERVICE: CPT | Mod: S$GLB,,, | Performed by: NURSE PRACTITIONER

## 2017-04-26 PROCEDURE — 3074F SYST BP LT 130 MM HG: CPT | Mod: S$GLB,,, | Performed by: NURSE PRACTITIONER

## 2017-04-26 PROCEDURE — 1157F ADVNC CARE PLAN IN RCRD: CPT | Mod: GC,S$GLB,, | Performed by: INTERNAL MEDICINE

## 2017-04-26 PROCEDURE — 1126F AMNT PAIN NOTED NONE PRSNT: CPT | Mod: S$GLB,,, | Performed by: NURSE PRACTITIONER

## 2017-04-26 PROCEDURE — 1159F MED LIST DOCD IN RCRD: CPT | Mod: GC,S$GLB,, | Performed by: INTERNAL MEDICINE

## 2017-04-26 PROCEDURE — 3078F DIAST BP <80 MM HG: CPT | Mod: GC,S$GLB,, | Performed by: INTERNAL MEDICINE

## 2017-04-26 PROCEDURE — 99212 OFFICE O/P EST SF 10 MIN: CPT | Mod: S$GLB,,, | Performed by: NURSE PRACTITIONER

## 2017-04-26 PROCEDURE — 1160F RVW MEDS BY RX/DR IN RCRD: CPT | Mod: S$GLB,,, | Performed by: NURSE PRACTITIONER

## 2017-04-26 PROCEDURE — 3074F SYST BP LT 130 MM HG: CPT | Mod: GC,S$GLB,, | Performed by: INTERNAL MEDICINE

## 2017-04-26 PROCEDURE — 99203 OFFICE O/P NEW LOW 30 MIN: CPT | Mod: GC,S$GLB,, | Performed by: INTERNAL MEDICINE

## 2017-04-26 PROCEDURE — 3078F DIAST BP <80 MM HG: CPT | Mod: S$GLB,,, | Performed by: NURSE PRACTITIONER

## 2017-04-26 PROCEDURE — 1157F ADVNC CARE PLAN IN RCRD: CPT | Mod: S$GLB,,, | Performed by: NURSE PRACTITIONER

## 2017-04-26 PROCEDURE — 99999 PR PBB SHADOW E&M-EST. PATIENT-LVL IV: CPT | Mod: PBBFAC,GC,, | Performed by: INTERNAL MEDICINE

## 2017-04-26 PROCEDURE — 1160F RVW MEDS BY RX/DR IN RCRD: CPT | Mod: GC,S$GLB,, | Performed by: INTERNAL MEDICINE

## 2017-04-26 PROCEDURE — 1159F MED LIST DOCD IN RCRD: CPT | Mod: S$GLB,,, | Performed by: NURSE PRACTITIONER

## 2017-04-26 RX ORDER — GABAPENTIN 300 MG/1
300 CAPSULE ORAL 2 TIMES DAILY
Qty: 90 CAPSULE | Refills: 11 | Status: SHIPPED | OUTPATIENT
Start: 2017-04-26 | End: 2018-01-23

## 2017-04-26 RX ORDER — MINOCYCLINE HYDROCHLORIDE 100 MG/1
100 CAPSULE ORAL EVERY 12 HOURS
Qty: 28 CAPSULE | Refills: 1 | Status: SHIPPED | OUTPATIENT
Start: 2017-04-29 | End: 2017-05-16 | Stop reason: SDUPTHER
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
## 2017-04-26 NOTE — MR AVS SNAPSHOT
[facility] - Infectious Diseases  [address]  [facility] [address]  Phone: [phone]  Fax: [phone]                  Janusz Montgomery Jr.   2017 1:00 PM   Office Visit    Description:  Male : 1945   Provider:  JIMMY Meléndez, ANP   Department:  [facility] - Infectious Diseases           Reason for Visit     Follow-up           Diagnoses this Visit        Comments    Cellulitis of left lower extremity    -  Primary     Receiving intravenous antibiotic treatment as outpatient                To Do List           Future Appointments        Provider Department Dept Phone    2017 2:40 PM Jessica Herndon MD [facility] - Nephrology [phone]    2017 2:00 PM Silvia Leroy PA-C [facility] - Orthopedics [phone]    2017 2:00 PM Miguelina Weathers MD [facility] - [facility] [phone]    2017 9:00 AM EKG, APPT [facility] - [phone]    2017 9:40 AM Luis Palacio MD [facility] - Arrhythmia [phone]      Goals (5 Years of Data)              17    Patient/caregiver will have an action plan in place to manage and prevent complications of  infectio/wound prior to discharge from OPCM.   On track    Notes - Note created  3/24/2017  3:11 PM by Delfina Sykes RN    Overall Time to Completion  2 months from 2017    Short Term Goals  Patient/caregiver will discuss health care needs with Physician and care team during visits or using Patient Portal.  Interventions   · Empower patient/caregiver to discuss treatment plan with Physician/care team.   Status  · Met    Patient/caregiver will maintain follow-up appointment with Physician within 1 week.  Interventions   · Encourage compliance with Physician follow-ups.   Status  · Partially Met    Patient/caregiver will verbalize 3 signs and symptoms of Infectious Disease within 2 weeks.   Interventions   · Recognize and provide educational material (SUKUMAR).   Status  · Partially met    Patient/caregiver will  verbalize 3 ways of preventing complications due to disease process within 2 weeks.  Interventions   · Discuss appropriate use of Home health with patient/caregiver.  · Recognize and provide educational material (SUKUMAR).   Status  · Partially met          Patient/caregiver will have knowledge of resources available in order to obtain the services that are needed prior to discharge from [facility].   On track    Notes - Note edited  3/24/2017  3:11 PM by Delfina Sykes RN    Overall Time to Completion  1 month from 03/17/2017    Short Term Goals  Patient/caregiver will verbalize understanding and will follow hospital discharge plan 2 weeks.  Interventions   · Collaborate with Inpatient case management regarding Discharge Plan.   Status  · Partially met  Met          [facility]     [facility] On Call Nurse Care Line - 24/7 Assistance  Unless otherwise directed by your provider, please contact [facility], our nurse care line that is available for 24/7 assistance.     Registered nurses in the [facility] On Call Center provide: appointment scheduling, clinical advisement, health education, and other advisory services.  Call: [phone] (toll free)               Medications           Message regarding Medications     Verify the changes and/or additions to your medication regime listed below are the same as discussed with your clinician today.  If any of these changes or additions are incorrect, please notify your healthcare provider.             Verify that the below list of medications is an accurate representation of the medications you are currently taking.  If none reported, the list may be blank. If incorrect, please contact your healthcare provider. Carry this list with you in case of emergency.           Current Medications     artificial tears (ISOPTO TEARS) 0.5 % ophthalmic solution Place 1 drop into both eyes as needed.    aspirin 325 MG tablet Take 1 tablet (325 mg total) by mouth once daily.    atorvastatin  (LIPITOR) 40 MG tablet TAKE ONE TABLET BY MOUTH ONCE DAILY    cetirizine (ZYRTEC) 10 MG tablet Take 1 tablet (10 mg total) by mouth once daily.    collagenase ointment Apply topically every Mon, Wed, Fri. Nursing to apply Santyl  nickel thick to wound bed and edges and cover with damp gauze (to activate santyl) daily and cover with telfa island dressing (mepore) over each pressure injury. Santyl has autolytic debridement properties and will promote healing.    CYANOCOBALAMIN, VITAMIN B-12, (VITAMIN B-12 ORAL) Take 2,500 mcg by mouth once daily.    dextrose 5 % SolP 250 mL with vancomycin 1,000 mg SolR 1,500 mg Inject 1,500 mg into the vein once daily. 4/26/17=stop date.    diclofenac sodium 1 % Gel Apply 2 g topically once daily. Apply 2 g to left shoulder    diphenhydrAMINE (BENADRYL) 25 mg capsule Take 1 each (25 mg total) by mouth every 6 (six) hours as needed for Itching.    docusate sodium (COLACE) 100 MG capsule Take 1 capsule (100 mg total) by mouth 2 (two) times daily as needed for Constipation.    furosemide (LASIX) 20 MG tablet Take 1 tablet (20 mg total) by mouth 2 (two) times daily.    gabapentin (NEURONTIN) 300 MG capsule Take 1 capsule (300 mg total) by mouth 3 (three) times daily.    melatonin 3 mg Tab Take 6 mg by mouth nightly as needed (for sleep).     miconazole (MICOTIN) 2 % cream Apply topically 2 (two) times daily.    omeprazole (PRILOSEC) 20 MG capsule Take 1 capsule (20 mg total) by mouth once daily.    oxycodone-acetaminophen (PERCOCET)  mg per tablet Take 1 tablet by mouth every 4 (four) hours as needed for Pain.    promethazine (PHENERGAN) 12.5 MG Tab Take 1 tablet (12.5 mg total) by mouth every 6 (six) hours as needed (for nausea).    ramipril (ALTACE) 5 MG capsule TAKE ONE CAPSULE BY MOUTH ONCE DAILY    tamsulosin (FLOMAX) 0.4 mg Cp24 Take 1 capsule (0.4 mg total) by mouth once daily.    venlafaxine (EFFEXOR) 75 MG tablet Take 2 tablets (150 mg total) by mouth 2 (two) times daily.  "   vitamin D 1000 units Tab Take 1 tablet (1,000 Units total) by mouth once daily.    zinc sulfate (ZINCATE) 220 (50) mg capsule Take 1 capsule (220 mg total) by mouth once daily.           Clinical Reference Information           Your Vitals Were     BP Pulse Temp Height Weight BMI    104/63 (BP Location: Left arm, Patient Position: Sitting, BP Method: Automatic) 69 97.8 °F (36.6 °C) (Oral) 6' 1" (1.854 m) 145.2 kg (320 lb) 42.22 kg/m2      Blood Pressure          Most Recent Value    BP  104/63      Allergies as of 4/26/2017     Ciprofloxacin    Zosyn [Piperacillin-tazobactam]    Bacitracin      Immunizations Administered on Date of Encounter - 4/26/2017     None      Orders Placed During Today's Visit      Normal Orders This Visit    C-reactive protein       Instructions    1. Continue vancomycin IV. Plan until 4/28.  Will check inflammatory markers today and make decision regarding extending IV after you seen Orthpedics tomorrow.   2. Come to infusion center on Friday for PICC removal if we decide to stop .   3. Will send in RX for minocycline 100 mg by mouth twice a day.   Be sure to take with full glass of water and stay upright for 30 minutes after taking.   4.  Call for any questions            Language Assistance Services     ATTENTION: Language assistance services are available, free of charge. Please call [phone].      ATENCIÓN: Si habla frediañol, tiene a robison disposición servicios gratuitos de asistencia lingüística. Llame al [phone].     [facility] Ý: N?u b?n nói Ti?ng Vi?t, có các d?ch v? h? tr? ngôn ng? mi?n phí dành cho b?n. G?i s? [phone].         [facility] - Infectious Diseases complies with applicable Federal civil rights laws and does not discriminate on the basis of race, color, national origin, age, disability, or sex.        "

## 2017-04-26 NOTE — PATIENT INSTRUCTIONS
1. Continue vancomycin IV. Plan until 4/28.  Will check inflammatory markers today and make decision regarding extending IV after you seen Orthpedics tomorrow.   2. Come to infusion center on Friday for PICC removal if we decide to stop .   3. Will send in RX for minocycline 100 mg by mouth twice a day.   Be sure to take with full glass of water and stay upright for 30 minutes after taking.   4.  Call for any questions

## 2017-04-27 ENCOUNTER — OFFICE VISIT (OUTPATIENT)
Dept: ORTHOPEDICS | Facility: CLINIC | Age: 72
End: 2017-04-27
Payer: MEDICARE

## 2017-04-27 ENCOUNTER — HOSPITAL ENCOUNTER (OUTPATIENT)
Dept: RADIOLOGY | Facility: HOSPITAL | Age: 72
Discharge: HOME | End: 2017-04-27
Attending: ORTHOPAEDIC SURGERY
Payer: MEDICARE

## 2017-04-27 DIAGNOSIS — S82.202N TRAUMATIC TYPE III OPEN FRACTURE OF SHAFT OF LEFT TIBIA AND FIBULA WITH NONUNION: ICD-10-CM

## 2017-04-27 DIAGNOSIS — S82.402N TRAUMATIC TYPE III OPEN FRACTURE OF SHAFT OF LEFT TIBIA AND FIBULA WITH NONUNION: ICD-10-CM

## 2017-04-27 DIAGNOSIS — I48.0 PAF (PAROXYSMAL ATRIAL FIBRILLATION): ICD-10-CM

## 2017-04-27 DIAGNOSIS — S82.202N TRAUMATIC TYPE III OPEN FRACTURE OF SHAFT OF LEFT TIBIA AND FIBULA WITH NONUNION: Primary | ICD-10-CM

## 2017-04-27 DIAGNOSIS — Z95.818 STATUS POST PLACEMENT OF IMPLANTABLE LOOP RECORDER: Primary | ICD-10-CM

## 2017-04-27 DIAGNOSIS — Z09 S/P ORTHOPEDIC SURGERY, FOLLOW-UP EXAM: ICD-10-CM

## 2017-04-27 DIAGNOSIS — S82.402N TRAUMATIC TYPE III OPEN FRACTURE OF SHAFT OF LEFT TIBIA AND FIBULA WITH NONUNION: Primary | ICD-10-CM

## 2017-04-27 PROCEDURE — 99024 POSTOP FOLLOW-UP VISIT: CPT | Mod: S$GLB,,, | Performed by: PHYSICIAN ASSISTANT

## 2017-04-27 PROCEDURE — 99999 PR PBB SHADOW E&M-EST. PATIENT-LVL III: CPT | Mod: PBBFAC,,, | Performed by: PHYSICIAN ASSISTANT

## 2017-04-27 PROCEDURE — 99499 UNLISTED E&M SERVICE: CPT | Mod: S$GLB,,, | Performed by: PHYSICIAN ASSISTANT

## 2017-04-27 PROCEDURE — 73610 X-RAY EXAM OF ANKLE: CPT | Mod: 26,LT,, | Performed by: RADIOLOGY

## 2017-04-27 PROCEDURE — 73610 X-RAY EXAM OF ANKLE: CPT | Mod: TC,LT

## 2017-04-28 ENCOUNTER — INFUSION (OUTPATIENT)
Dept: INFECTIOUS DISEASES | Facility: HOSPITAL | Age: 72
End: 2017-04-28
Attending: INTERNAL MEDICINE
Payer: MEDICARE

## 2017-04-28 ENCOUNTER — OFFICE VISIT (OUTPATIENT)
Dept: FAMILY MEDICINE | Facility: CLINIC | Age: 72
End: 2017-04-28
Payer: MEDICARE

## 2017-04-28 VITALS
TEMPERATURE: 98 F | WEIGHT: 315 LBS | HEART RATE: 62 BPM | BODY MASS INDEX: 41.75 KG/M2 | SYSTOLIC BLOOD PRESSURE: 126 MMHG | OXYGEN SATURATION: 96 % | DIASTOLIC BLOOD PRESSURE: 84 MMHG | HEIGHT: 73 IN

## 2017-04-28 DIAGNOSIS — I10 ESSENTIAL HYPERTENSION: Primary | ICD-10-CM

## 2017-04-28 DIAGNOSIS — G47.33 OBSTRUCTIVE SLEEP APNEA ON CPAP: ICD-10-CM

## 2017-04-28 DIAGNOSIS — E78.5 HYPERLIPIDEMIA, UNSPECIFIED HYPERLIPIDEMIA TYPE: ICD-10-CM

## 2017-04-28 DIAGNOSIS — C61 PROSTATE CANCER: ICD-10-CM

## 2017-04-28 DIAGNOSIS — I48.0 PAROXYSMAL ATRIAL FIBRILLATION: ICD-10-CM

## 2017-04-28 DIAGNOSIS — Z12.5 SCREENING FOR PROSTATE CANCER: ICD-10-CM

## 2017-04-28 DIAGNOSIS — K21.9 GASTROESOPHAGEAL REFLUX DISEASE WITHOUT ESOPHAGITIS: ICD-10-CM

## 2017-04-28 DIAGNOSIS — F33.0 MAJOR DEPRESSIVE DISORDER, RECURRENT EPISODE, MILD: ICD-10-CM

## 2017-04-28 PROBLEM — Z79.2 RECEIVING INTRAVENOUS ANTIBIOTIC TREATMENT AS OUTPATIENT: Status: RESOLVED | Noted: 2017-04-26 | Resolved: 2017-04-28

## 2017-04-28 PROBLEM — I48.91 A-FIB: Status: RESOLVED | Noted: 2017-04-07 | Resolved: 2017-04-28

## 2017-04-28 PROBLEM — N17.9 AKI (ACUTE KIDNEY INJURY): Status: RESOLVED | Noted: 2017-03-19 | Resolved: 2017-04-28

## 2017-04-28 PROBLEM — L03.116 CELLULITIS OF LEFT LOWER EXTREMITY: Status: RESOLVED | Noted: 2017-03-19 | Resolved: 2017-04-28

## 2017-04-28 PROBLEM — Z88.3: Status: RESOLVED | Noted: 2017-03-16 | Resolved: 2017-04-28

## 2017-04-28 PROCEDURE — 99215 OFFICE O/P EST HI 40 MIN: CPT | Mod: S$GLB,,, | Performed by: INTERNAL MEDICINE

## 2017-04-28 PROCEDURE — 99499 UNLISTED E&M SERVICE: CPT | Mod: S$GLB,,, | Performed by: INTERNAL MEDICINE

## 2017-04-28 PROCEDURE — 3074F SYST BP LT 130 MM HG: CPT | Mod: S$GLB,,, | Performed by: INTERNAL MEDICINE

## 2017-04-28 PROCEDURE — 99999 PR PBB SHADOW E&M-EST. PATIENT-LVL III: CPT | Mod: PBBFAC,,, | Performed by: INTERNAL MEDICINE

## 2017-04-28 PROCEDURE — 1159F MED LIST DOCD IN RCRD: CPT | Mod: S$GLB,,, | Performed by: INTERNAL MEDICINE

## 2017-04-28 PROCEDURE — 1157F ADVNC CARE PLAN IN RCRD: CPT | Mod: S$GLB,,, | Performed by: INTERNAL MEDICINE

## 2017-04-28 PROCEDURE — 3079F DIAST BP 80-89 MM HG: CPT | Mod: S$GLB,,, | Performed by: INTERNAL MEDICINE

## 2017-04-28 PROCEDURE — 1160F RVW MEDS BY RX/DR IN RCRD: CPT | Mod: S$GLB,,, | Performed by: INTERNAL MEDICINE

## 2017-04-28 PROCEDURE — 1126F AMNT PAIN NOTED NONE PRSNT: CPT | Mod: S$GLB,,, | Performed by: INTERNAL MEDICINE

## 2017-04-28 NOTE — MR AVS SNAPSHOT
[facility]  [address] [address]  [place] [address]  Phone: [phone]  Fax: [phone]                  Janusz Montgomery Jr.   2017 2:00 PM   Office Visit    Description:  Male : 1945   Provider:  Miguelina Weathers MD   Department[facility]  [facility] Medicine           Reason for Visit     Hypertension     Hyperlipidemia           Diagnoses this Visit        Comments    Essential hypertension    -  Primary     Hyperlipidemia, unspecified hyperlipidemia type         Obstructive sleep apnea on CPAP         Gastroesophageal reflux disease without esophagitis         Paroxysmal atrial fibrillation         Major depressive disorder, recurrent episode, mild         Prostate cancer         Screening for prostate cancer                To Do List           Future Appointments        Provider Department Dept Phone    2017 1:00 PM JIMMY Meléndez, ANP [facility] - Infectious Diseases [phone]    2017 8:00 AM HOME MONITOR DEVICE CHECK, Replaced by [facility] - Arrhythmia [phone]    2017 10:45 AM Walter Cortés MD [facility] - Orthopedics [phone]    2017 8:00 AM HOME MONITOR DEVICE CHECK, Replaced by [facility] - Arrhythmia [phone]    2017 9:00 AM EKG, APPT [facility] - [phone]      Goals (5 Years of Data)              17    Patient/caregiver will have an action plan in place to manage and prevent complications of  infectio/wound prior to discharge from OPCM.   On track    Notes - Note created  3/24/2017  3:11 PM by Delfina Sykes RN    Overall Time to Completion  2 months from 2017    Short Term Goals  Patient/caregiver will discuss health care needs with Physician and care team during visits or using Patient Portal.  Interventions   · Empower patient/caregiver to discuss treatment plan with Physician/care team.   Status  · Met    Patient/caregiver will maintain follow-up appointment with Physician within 1 week.  Interventions   · Encourage  compliance with Physician follow-ups.   Status  · Partially Met    Patient/caregiver will verbalize 3 signs and symptoms of Infectious Disease within 2 weeks.   Interventions   · Recognize and provide educational material (SUKUMAR).   Status  · Partially met    Patient/caregiver will verbalize 3 ways of preventing complications due to disease process within 2 weeks.  Interventions   · Discuss appropriate use of Home health with patient/caregiver.  · Recognize and provide educational material (SUKUMAR).   Status  · Partially met          Patient/caregiver will have knowledge of resources available in order to obtain the services that are needed prior to discharge from O[facility].   On track    Notes - Note edited  3/24/2017  3:11 PM by Delfina Sykes RN    Overall Time to Completion  1 month from 03/17/2017    Short Term Goals  Patient/caregiver will verbalize understanding and will follow hospital discharge plan 2 weeks.  Interventions   · Collaborate with Inpatient case management regarding Discharge Plan.   Status  · Partially met  Met          Follow-Up and Disposition     Return in about 6 months (around 10/28/2017) for follow up chronic medical conditions..      [facility] On Call     [facility] On Call Nurse Care Line - 24/7 Assistance  Unless otherwise directed by your provider, please contact [facility] On-Call, our nurse care line that is available for 24/7 assistance.     Registered nurses in the [facility] On Call Center provide: appointment scheduling, clinical advisement, health education, and other advisory services.  Call: [phone] (toll free)               Medications           Message regarding Medications     Verify the changes and/or additions to your medication regime listed below are the same as discussed with your clinician today.  If any of these changes or additions are incorrect, please notify your healthcare provider.             Verify that the below list of medications is an accurate representation of  the medications you are currently taking.  If none reported, the list may be blank. If incorrect, please contact your healthcare provider. Carry this list with you in case of emergency.           Current Medications     artificial tears (ISOPTO TEARS) 0.5 % ophthalmic solution Place 1 drop into both eyes as needed.    aspirin 325 MG tablet Take 1 tablet (325 mg total) by mouth once daily.    atorvastatin (LIPITOR) 40 MG tablet TAKE ONE TABLET BY MOUTH ONCE DAILY    cetirizine (ZYRTEC) 10 MG tablet Take 1 tablet (10 mg total) by mouth once daily.    collagenase ointment Apply topically every Mon, Wed, Fri. Nursing to apply Santyl  nickel thick to wound bed and edges and cover with damp gauze (to activate santyl) daily and cover with telfa island dressing (mepore) over each pressure injury. Santyl has autolytic debridement properties and will promote healing.    CYANOCOBALAMIN, VITAMIN B-12, (VITAMIN B-12 ORAL) Take 2,500 mcg by mouth once daily.    dextrose 5 % SolP 250 mL with vancomycin 1,000 mg SolR 1,500 mg Inject 1,500 mg into the vein once daily. 4/26/17=stop date.    diclofenac sodium 1 % Gel Apply 2 g topically once daily. Apply 2 g to left shoulder    diphenhydrAMINE (BENADRYL) 25 mg capsule Take 1 each (25 mg total) by mouth every 6 (six) hours as needed for Itching.    docusate sodium (COLACE) 100 MG capsule Take 1 capsule (100 mg total) by mouth 2 (two) times daily as needed for Constipation.    furosemide (LASIX) 20 MG tablet Take 1 tablet (20 mg total) by mouth 2 (two) times daily.    gabapentin (NEURONTIN) 300 MG capsule Take 1 capsule (300 mg total) by mouth 2 (two) times daily.    melatonin 3 mg Tab Take 6 mg by mouth nightly as needed (for sleep).     miconazole (MICOTIN) 2 % cream Apply topically 2 (two) times daily.    minocycline (MINOCIN,DYNACIN) 100 MG capsule Starting on Apr 29, 2017. Take 1 capsule (100 mg total) by mouth every 12 (twelve) hours.    oxycodone-acetaminophen (PERCOCET)   "mg per tablet Take 1 tablet by mouth every 4 (four) hours as needed for Pain.    promethazine (PHENERGAN) 12.5 MG Tab Take 1 tablet (12.5 mg total) by mouth every 6 (six) hours as needed (for nausea).    ramipril (ALTACE) 5 MG capsule TAKE ONE CAPSULE BY MOUTH ONCE DAILY    tamsulosin (FLOMAX) 0.4 mg Cp24 Take 1 capsule (0.4 mg total) by mouth once daily.    venlafaxine (EFFEXOR) 75 MG tablet Take 2 tablets (150 mg total) by mouth 2 (two) times daily.    vitamin D 1000 units Tab Take 1 tablet (1,000 Units total) by mouth once daily.    zinc sulfate (ZINCATE) 220 (50) mg capsule Take 1 capsule (220 mg total) by mouth once daily.           Clinical Reference Information           Your Vitals Were     BP Pulse Temp Height Weight SpO2    126/84 62 98.3 °F (36.8 °C) (Oral) 6' 1" (1.854 m) 145 kg (319 lb 10.7 oz) 96%    BMI                42.17 kg/m2          Blood Pressure          Most Recent Value    BP  126/84      Allergies as of 4/28/2017     Ciprofloxacin    Zosyn [Piperacillin-tazobactam]    Bacitracin      Immunizations Administered on Date of Encounter - 4/28/2017     None      Orders Placed During Today's Visit     Future Labs/Procedures Expected by Expires    PSA, Screening  4/28/2017 4/28/2018      Language Assistance Services     ATTENTION: Language assistance services are available, free of charge. Please call [phone].      ATENCIÓN: Si habla español, tiene a robison disposición servicios gratuitos de asistencia lingüística. Llame al [phone].     [facility] Ý: N?u b?n nói Ti?ng Vi?t, có các d?ch v? h? tr? ngôn ng? mi?n phí dành cho b?n. G?i s? [phone].         [facility] complies with applicable Federal civil rights laws and does not discriminate on the basis of race, color, national origin, age, disability, or sex.        "
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
## 2017-04-28 NOTE — PROGRESS NOTES
Mr. Montgomery is a 72-year-old male who underwent open reduction internal fixation of his left tibia open fracture 07/15/2017. The patient subsequently, upon weightbearing after three months, broke his plate and drifted into valgus. He, at that point, was taken back to the Operating Room for external fixation 12/28/2016 as he had a large heel ulcer. He has been undergoing outpatient wound therapy for the heel ulcer, which he is doing very well and near closing. The patient's external fixator is now loose and his heel ulcer looking better. Patient was taken to the OR for removal of external fixation device, removal of hardware and revision of fixation with allograft on 04/13/2017 by .     He is here today for a post-operative visit after a   1. Removal under anesthesia of external fixation, left leg.  2. Revision fixation of left distal tibial nonunion with allograft bone   grafting.  3. Non-excisional debridement, left heel ulcer.  4. Advancement flaps to left medial distal tibia, 8 cm.  by Dr. Cortés  on 04/13/2017.    he reports that he is doing ok.  Pain is controlled.  he is taking pain medication.  He is at home. He is recieving home health PT/     he denies fever, chills, and sweats since the time of the surgery.   Patient is followed by infectious disease. He is receiving vancomycin through his PICC line. Plan is for removal of PICC tomorrow and transition to oral antibiotics. Reports johnnie inflammatory markers continue to decrease.     Physical exam:  Post op dressing taken down.  Incision is clean, dry and intact.  Sutures removed without difficulty.    Heel wound is healing well.     Assessment:  Post-op visit ( 2 weeks)    Plan:  - continue home health PT  - NWB 10-12 weeks pending healing.   - return to clinic in 4 weeks with  at time x-ray of his ankle is needed     He will return to clinic sooner if any concerns or changes.

## 2017-05-01 ENCOUNTER — TELEPHONE (OUTPATIENT)
Dept: UROLOGY | Facility: CLINIC | Age: 72
End: 2017-05-01

## 2017-05-01 ENCOUNTER — PATIENT MESSAGE (OUTPATIENT)
Dept: UROLOGY | Facility: CLINIC | Age: 72
End: 2017-05-01

## 2017-05-01 DIAGNOSIS — C61 PROSTATE CANCER: Primary | ICD-10-CM

## 2017-05-01 RX ORDER — SULFAMETHOXAZOLE AND TRIMETHOPRIM 800; 160 MG/1; MG/1
1 TABLET ORAL 2 TIMES DAILY
Qty: 6 TABLET | Refills: 0 | Status: SHIPPED | OUTPATIENT
Start: 2017-05-01 | End: 2017-05-04

## 2017-05-01 NOTE — TELEPHONE ENCOUNTER
----- Message from Sonali Dorsey sent at 5/1/2017  1:33 PM CDT -----  Contact: self  Pt called to schedule a psa appt. Please advise. [phone]

## 2017-05-03 ENCOUNTER — LAB VISIT (OUTPATIENT)
Dept: LAB | Facility: HOSPITAL | Age: 72
End: 2017-05-03
Attending: INTERNAL MEDICINE
Payer: MEDICARE

## 2017-05-03 DIAGNOSIS — T84.7XXA INFECTION/INFLAMMATION-INTERNAL ORTHO DEVICE: Primary | ICD-10-CM

## 2017-05-03 LAB
ALBUMIN SERPL BCP-MCNC: 3.4 G/DL
ALP SERPL-CCNC: 144 U/L
ALT SERPL W/O P-5'-P-CCNC: 31 U/L
ANION GAP SERPL CALC-SCNC: 10 MMOL/L
AST SERPL-CCNC: 16 U/L
BASOPHILS # BLD AUTO: 0.07 K/UL
BASOPHILS NFR BLD: 0.8 %
BILIRUB SERPL-MCNC: 0.5 MG/DL
BUN SERPL-MCNC: 24 MG/DL
CALCIUM SERPL-MCNC: 9.5 MG/DL
CHLORIDE SERPL-SCNC: 104 MMOL/L
CO2 SERPL-SCNC: 29 MMOL/L
CREAT SERPL-MCNC: 1.2 MG/DL
CRP SERPL-MCNC: 9.2 MG/L
DIFFERENTIAL METHOD: ABNORMAL
EOSINOPHIL # BLD AUTO: 0.5 K/UL
EOSINOPHIL NFR BLD: 5 %
ERYTHROCYTE [DISTWIDTH] IN BLOOD BY AUTOMATED COUNT: 14.3 %
ERYTHROCYTE [SEDIMENTATION RATE] IN BLOOD BY WESTERGREN METHOD: 57 MM/HR
EST. GFR  (AFRICAN AMERICAN): >60 ML/MIN/1.73 M^2
EST. GFR  (NON AFRICAN AMERICAN): >60 ML/MIN/1.73 M^2
GLUCOSE SERPL-MCNC: 104 MG/DL
HCT VFR BLD AUTO: 32.5 %
HGB BLD-MCNC: 10.8 G/DL
LYMPHOCYTES # BLD AUTO: 2 K/UL
LYMPHOCYTES NFR BLD: 22.4 %
MCH RBC QN AUTO: 31.1 PG
MCHC RBC AUTO-ENTMCNC: 33.2 %
MCV RBC AUTO: 94 FL
MONOCYTES # BLD AUTO: 0.8 K/UL
MONOCYTES NFR BLD: 9 %
NEUTROPHILS # BLD AUTO: 5.6 K/UL
NEUTROPHILS NFR BLD: 62.7 %
PLATELET # BLD AUTO: 217 K/UL
PMV BLD AUTO: 10.6 FL
POTASSIUM SERPL-SCNC: 4.2 MMOL/L
PROT SERPL-MCNC: 7 G/DL
RBC # BLD AUTO: 3.47 M/UL
SODIUM SERPL-SCNC: 143 MMOL/L
WBC # BLD AUTO: 8.93 K/UL

## 2017-05-03 PROCEDURE — 85025 COMPLETE CBC W/AUTO DIFF WBC: CPT

## 2017-05-03 PROCEDURE — 80053 COMPREHEN METABOLIC PANEL: CPT

## 2017-05-03 PROCEDURE — 86140 C-REACTIVE PROTEIN: CPT

## 2017-05-03 PROCEDURE — 85651 RBC SED RATE NONAUTOMATED: CPT

## 2017-05-03 PROCEDURE — 36415 COLL VENOUS BLD VENIPUNCTURE: CPT

## 2017-05-04 NOTE — PROGRESS NOTES
Patient seen and examined with Dr Herndon;   I have reviewed and agree with assessment and plan

## 2017-05-08 ENCOUNTER — PATIENT MESSAGE (OUTPATIENT)
Dept: UROLOGY | Facility: CLINIC | Age: 72
End: 2017-05-08

## 2017-05-10 ENCOUNTER — LAB VISIT (OUTPATIENT)
Dept: LAB | Facility: HOSPITAL | Age: 72
End: 2017-05-10
Attending: INTERNAL MEDICINE
Payer: MEDICARE

## 2017-05-10 DIAGNOSIS — T84.7XXA INFECTION/INFLAMMATION-INTERNAL ORTHO DEVICE: Primary | ICD-10-CM

## 2017-05-10 LAB
ALBUMIN SERPL BCP-MCNC: 3.5 G/DL
ALP SERPL-CCNC: 151 U/L
ALT SERPL W/O P-5'-P-CCNC: 32 U/L
ANION GAP SERPL CALC-SCNC: 10 MMOL/L
AST SERPL-CCNC: 22 U/L
BILIRUB SERPL-MCNC: 0.8 MG/DL
BUN SERPL-MCNC: 22 MG/DL
CALCIUM SERPL-MCNC: 9.3 MG/DL
CHLORIDE SERPL-SCNC: 105 MMOL/L
CO2 SERPL-SCNC: 27 MMOL/L
CREAT SERPL-MCNC: 1.3 MG/DL
CRP SERPL-MCNC: 15.1 MG/L
EST. GFR  (AFRICAN AMERICAN): >60 ML/MIN/1.73 M^2
EST. GFR  (NON AFRICAN AMERICAN): 55 ML/MIN/1.73 M^2
GLUCOSE SERPL-MCNC: 97 MG/DL
POTASSIUM SERPL-SCNC: 4.6 MMOL/L
PROT SERPL-MCNC: 7.5 G/DL
SODIUM SERPL-SCNC: 142 MMOL/L

## 2017-05-10 PROCEDURE — 80053 COMPREHEN METABOLIC PANEL: CPT

## 2017-05-10 PROCEDURE — 86140 C-REACTIVE PROTEIN: CPT

## 2017-05-10 PROCEDURE — 36415 COLL VENOUS BLD VENIPUNCTURE: CPT

## 2017-05-11 ENCOUNTER — OUTPATIENT CASE MANAGEMENT (OUTPATIENT)
Dept: ADMINISTRATIVE | Facility: OTHER | Age: 72
End: 2017-05-11

## 2017-05-11 ENCOUNTER — PATIENT MESSAGE (OUTPATIENT)
Dept: UROLOGY | Facility: CLINIC | Age: 72
End: 2017-05-11

## 2017-05-11 DIAGNOSIS — F41.8 MIXED ANXIETY AND DEPRESSIVE DISORDER: ICD-10-CM

## 2017-05-11 RX ORDER — VENLAFAXINE 75 MG/1
TABLET ORAL
Qty: 360 TABLET | Refills: 0 | Status: SHIPPED | OUTPATIENT
Start: 2017-05-11 | End: 2017-07-25 | Stop reason: SDUPTHER

## 2017-05-11 NOTE — PROGRESS NOTES
Follow up with pt. Pt states he continues on Vancomycin. Pt states he was discharged from PT until he is weight bearing. Pt is still using a wheelchair. External fixator and wound vac discontinued. Home health nurse comes once/week for wound care and labs. Mrs. Montgomery does wound care twice/week. All needs addressed at this time. Pt disenrolled from [facility]. Pt has contact information for [facility] for any future needs.

## 2017-05-16 ENCOUNTER — OUTPATIENT CASE MANAGEMENT (OUTPATIENT)
Dept: ADMINISTRATIVE | Facility: OTHER | Age: 72
End: 2017-05-16

## 2017-05-16 ENCOUNTER — OFFICE VISIT (OUTPATIENT)
Dept: INFECTIOUS DISEASES | Facility: CLINIC | Age: 72
End: 2017-05-16
Payer: MEDICARE

## 2017-05-16 VITALS — HEIGHT: 73 IN | BODY MASS INDEX: 41.75 KG/M2 | WEIGHT: 315 LBS

## 2017-05-16 DIAGNOSIS — Z79.2 LONG TERM (CURRENT) USE OF ANTIBIOTICS: ICD-10-CM

## 2017-05-16 DIAGNOSIS — S82.202N TRAUMATIC TYPE III OPEN FRACTURE OF SHAFT OF LEFT TIBIA AND FIBULA WITH NONUNION: ICD-10-CM

## 2017-05-16 DIAGNOSIS — L03.116 CELLULITIS OF LEFT LOWER EXTREMITY: ICD-10-CM

## 2017-05-16 DIAGNOSIS — L97.529 NON-PRESSURE CHRONIC ULCER OF OTHER PART OF LEFT FOOT WITH UNSPECIFIED SEVERITY: Primary | ICD-10-CM

## 2017-05-16 DIAGNOSIS — B00.52 HERPES SIMPLEX KERATOCONJUNCTIVITIS: ICD-10-CM

## 2017-05-16 DIAGNOSIS — H25.13 NUCLEAR SCLEROSIS OF BOTH EYES: ICD-10-CM

## 2017-05-16 DIAGNOSIS — S82.402N TRAUMATIC TYPE III OPEN FRACTURE OF SHAFT OF LEFT TIBIA AND FIBULA WITH NONUNION: ICD-10-CM

## 2017-05-16 PROCEDURE — 1126F AMNT PAIN NOTED NONE PRSNT: CPT | Mod: S$GLB,,, | Performed by: NURSE PRACTITIONER

## 2017-05-16 PROCEDURE — 1157F ADVNC CARE PLAN IN RCRD: CPT | Mod: S$GLB,,, | Performed by: NURSE PRACTITIONER

## 2017-05-16 PROCEDURE — 1160F RVW MEDS BY RX/DR IN RCRD: CPT | Mod: S$GLB,,, | Performed by: NURSE PRACTITIONER

## 2017-05-16 PROCEDURE — 99499 UNLISTED E&M SERVICE: CPT | Mod: S$GLB,,, | Performed by: NURSE PRACTITIONER

## 2017-05-16 PROCEDURE — 99213 OFFICE O/P EST LOW 20 MIN: CPT | Mod: S$GLB,,, | Performed by: NURSE PRACTITIONER

## 2017-05-16 PROCEDURE — 1159F MED LIST DOCD IN RCRD: CPT | Mod: S$GLB,,, | Performed by: NURSE PRACTITIONER

## 2017-05-16 PROCEDURE — 99999 PR PBB SHADOW E&M-EST. PATIENT-LVL IV: CPT | Mod: PBBFAC,,, | Performed by: NURSE PRACTITIONER

## 2017-05-16 RX ORDER — MINOCYCLINE HYDROCHLORIDE 100 MG/1
100 CAPSULE ORAL EVERY 12 HOURS
Qty: 60 CAPSULE | Refills: 2 | Status: SHIPPED | OUTPATIENT
Start: 2017-05-16 | End: 2017-07-25
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
## 2017-05-16 NOTE — PATIENT INSTRUCTIONS
1.  Continue minocycline 100 mg orally twice a day.   2.  Plan for 3 months.   3.  Will discuss duration with Dr. Cortés  4.  Labs today   5.  I will discuss the pre-op antibiotic with your urologist.   6.  Follow up 3 months or sooner if needed.     7.  Referral to Ophthalmology -  appointment next week.  Seek immediate medical attention if develop eye pain, drainage, loss of visual acuity.

## 2017-05-16 NOTE — PROGRESS NOTES
Please note this patient was mailed a Patient Satisfaction Discharge Survey on 5-16-17        Thank you,      Montse Mayers, SSC

## 2017-05-23 ENCOUNTER — OFFICE VISIT (OUTPATIENT)
Dept: OPTOMETRY | Facility: CLINIC | Age: 72
End: 2017-05-23
Payer: MEDICARE

## 2017-05-23 DIAGNOSIS — Z96.1 PSEUDOPHAKIA, BOTH EYES: ICD-10-CM

## 2017-05-23 DIAGNOSIS — H57.13 EYE PAIN, BILATERAL: Primary | ICD-10-CM

## 2017-05-23 DIAGNOSIS — B00.52 HERPES SIMPLEX DISCIFORM KERATITIS: ICD-10-CM

## 2017-05-23 DIAGNOSIS — I10 ESSENTIAL HYPERTENSION: ICD-10-CM

## 2017-05-23 PROCEDURE — 99999 PR PBB SHADOW E&M-EST. PATIENT-LVL II: CPT | Mod: PBBFAC,,, | Performed by: OPTOMETRIST

## 2017-05-23 PROCEDURE — 99499 UNLISTED E&M SERVICE: CPT | Mod: S$GLB,,, | Performed by: OPTOMETRIST

## 2017-05-23 PROCEDURE — 92014 COMPRE OPH EXAM EST PT 1/>: CPT | Mod: S$GLB,,, | Performed by: OPTOMETRIST

## 2017-05-23 RX ORDER — ACYCLOVIR 800 MG/1
200 TABLET ORAL DAILY
COMMUNITY
End: 2017-09-08

## 2017-05-23 NOTE — PROGRESS NOTES
HPI     Patient's last dilated exam was: 3/18/216  Pt states: s/p pciol ou Dr. Conteh, he has been treating him for HSK,   has been having flare ups, recently had sx on his foot so they took him   off his regular antivirals meds apprx 3 months ago. Currently taking an   antibiotic . Feels his vision has decreased since CE. Using otc readers   only, +2.50 . Floaters ou. Patient denies flashes, pain and double vision.   Eyes feel strained at night when watching tv or on the computer, treats   with warm compresses.       Last edited by Tena Tobias, OD on 5/23/2017  1:46 PM. (History)            Assessment /Plan     For exam results, see Encounter Report.    Eye pain, bilateral    Herpes simplex disciform keratitis    Essential hypertension    Pseudophakia, both eyes              1.  Educated pt eye discomfort due to dryness.  Uses CPAP at night.  Educated pt to use artificial tears at least 2x/day and Gel drops at night.  2.  No signs of HSV today--cornea clear.  Currently not taking Acyclovir due to interfering with other medication.  Monitor 6 months.  3.  No retinopathy--monitor yearly.  BP control.  4.   Continue w/ current rx.  Educated pt normal to need reading glasses after cataract surgery.

## 2017-05-23 NOTE — LETTER
May 23, 2017      JIMMY Meléndez, ANP  [address]  [facility] [address]           [facility]  [address]  [facility] [address]  Phone: [phone]  Fax: [phone]          Patient: Janusz Montgomery Jr.   MR Number: [id number]   YOB: 1945   Date of Visit: 5/23/2017       Dear Sherita Roberts:    Thank you for referring Janusz Montgomery to me for evaluation. Attached you will find relevant portions of my assessment and plan of care.    If you have questions, please do not hesitate to call me. I look forward to following Janusz Montgomery along with you.    Sincerely,    Tena Tobias, OD    Enclosure  CC:  No Recipients    If you would like to receive this communication electronically, please contact [email or web address] or [phone] to request more information on [employer] access.    For providers and/or their staff who would like to refer a patient to [facility], please contact us through our one-stop-shop provider referral line, [facility], at [phone].    If you feel you have received this communication in error or would no longer like to receive these types of communications, please e-mail [email or web address]

## 2017-05-24 ENCOUNTER — PATIENT MESSAGE (OUTPATIENT)
Dept: INFECTIOUS DISEASES | Facility: CLINIC | Age: 72
End: 2017-05-24

## 2017-05-24 ENCOUNTER — CLINICAL SUPPORT (OUTPATIENT)
Dept: ELECTROPHYSIOLOGY | Facility: CLINIC | Age: 72
End: 2017-05-24
Payer: MEDICARE

## 2017-05-24 DIAGNOSIS — I48.0 PAF (PAROXYSMAL ATRIAL FIBRILLATION): ICD-10-CM

## 2017-05-24 DIAGNOSIS — Z95.818 STATUS POST PLACEMENT OF IMPLANTABLE LOOP RECORDER: ICD-10-CM

## 2017-05-24 PROCEDURE — 93299 LOOP RECORDER REMOTE: CPT | Mod: S$GLB,,, | Performed by: INTERNAL MEDICINE

## 2017-05-24 PROCEDURE — 93297 REM INTERROG DEV EVAL ICPMS: CPT | Mod: S$GLB,,, | Performed by: INTERNAL MEDICINE

## 2017-05-26 ENCOUNTER — TELEPHONE (OUTPATIENT)
Dept: FAMILY MEDICINE | Facility: CLINIC | Age: 72
End: 2017-05-26

## 2017-05-26 DIAGNOSIS — Z09 S/P ORTHOPEDIC SURGERY, FOLLOW-UP EXAM: Primary | ICD-10-CM

## 2017-05-26 NOTE — TELEPHONE ENCOUNTER
----- Message from Ursula Zapata sent at 5/26/2017 10:44 AM CDT -----  [facility] has orders they need Dr peguero to sing. They were dropped back in March, but never received back from us. Please call at [phone]. Thank you!

## 2017-05-26 NOTE — TELEPHONE ENCOUNTER
Spoke w/Joanie, [facility] nurse. States she will re-fax  orders for signature.

## 2017-05-30 ENCOUNTER — OFFICE VISIT (OUTPATIENT)
Dept: ORTHOPEDICS | Facility: CLINIC | Age: 72
End: 2017-05-30
Payer: MEDICARE

## 2017-05-30 ENCOUNTER — HOSPITAL ENCOUNTER (OUTPATIENT)
Dept: RADIOLOGY | Facility: HOSPITAL | Age: 72
Discharge: HOME | End: 2017-05-30
Attending: ORTHOPAEDIC SURGERY
Payer: MEDICARE

## 2017-05-30 VITALS — WEIGHT: 315 LBS | BODY MASS INDEX: 41.75 KG/M2 | HEIGHT: 73 IN

## 2017-05-30 DIAGNOSIS — Z09 S/P ORTHOPEDIC SURGERY, FOLLOW-UP EXAM: ICD-10-CM

## 2017-05-30 DIAGNOSIS — L97.529 NON-PRESSURE CHRONIC ULCER OF OTHER PART OF LEFT FOOT WITH UNSPECIFIED SEVERITY: ICD-10-CM

## 2017-05-30 DIAGNOSIS — S82.202N TRAUMATIC TYPE III OPEN FRACTURE OF SHAFT OF LEFT TIBIA AND FIBULA WITH NONUNION: Primary | ICD-10-CM

## 2017-05-30 DIAGNOSIS — S82.202F: Primary | ICD-10-CM

## 2017-05-30 DIAGNOSIS — S82.402F: Primary | ICD-10-CM

## 2017-05-30 DIAGNOSIS — S82.402N TRAUMATIC TYPE III OPEN FRACTURE OF SHAFT OF LEFT TIBIA AND FIBULA WITH NONUNION: Primary | ICD-10-CM

## 2017-05-30 PROCEDURE — 99499 UNLISTED E&M SERVICE: CPT | Mod: S$GLB,,, | Performed by: ORTHOPAEDIC SURGERY

## 2017-05-30 PROCEDURE — 73610 X-RAY EXAM OF ANKLE: CPT | Mod: 26,LT,, | Performed by: RADIOLOGY

## 2017-05-30 PROCEDURE — 99024 POSTOP FOLLOW-UP VISIT: CPT | Mod: S$GLB,,, | Performed by: ORTHOPAEDIC SURGERY

## 2017-05-30 PROCEDURE — 99999 PR PBB SHADOW E&M-EST. PATIENT-LVL II: CPT | Mod: PBBFAC,,, | Performed by: ORTHOPAEDIC SURGERY

## 2017-06-01 ENCOUNTER — TELEPHONE (OUTPATIENT)
Dept: ADMINISTRATIVE | Facility: CLINIC | Age: 72
End: 2017-06-01

## 2017-06-02 ENCOUNTER — TELEPHONE (OUTPATIENT)
Dept: INFECTIOUS DISEASES | Facility: CLINIC | Age: 72
End: 2017-06-02

## 2017-06-02 NOTE — TELEPHONE ENCOUNTER
----- Message from Gabriella Gabriel MA sent at 6/1/2017  3:27 PM CDT -----  Patient's wife Bri called and stated Vangie left her a message to call the office back today. Vangie is gone on vacation until June 13, 2017. I was wondering if you knew what this is about?

## 2017-06-02 NOTE — TELEPHONE ENCOUNTER
Left message returning Nallely's call from [facility].  Will advise No home health wound care to be be performed by HH.  Wife will do wound care.

## 2017-06-02 NOTE — TELEPHONE ENCOUNTER
Spoke to patient and his wife.  Confirmed that will get one set of labs for this month.  Order was sent to Home Health.  He saw Dr. Cortés earlier in the week.     He is to continue the doxycycline.Reports some stomach upset.  No vomiting.  No diarrhea.  Reminded to take medication with full glass of water and stay upright for 30 minutes after taking.     Follow up in 2 months.

## 2017-06-05 ENCOUNTER — TELEPHONE (OUTPATIENT)
Dept: UROLOGY | Facility: CLINIC | Age: 72
End: 2017-06-05

## 2017-06-05 DIAGNOSIS — C61 PROSTATE CANCER: Primary | ICD-10-CM

## 2017-06-05 NOTE — TELEPHONE ENCOUNTER
Apt rescheduled- patient notified via phone and [employer]- copy  Of instructions sent with apt reminder-   Patient reports that he is on Minocycline 100 mg BID to prevent bone infx due to a sore in his foot- Does he need to take the cipro prior still ans if so can a new rx be sent over? Please advise

## 2017-06-05 NOTE — TELEPHONE ENCOUNTER
----- Message from Maritzaluis carlos Alvarado sent at 6/5/2017  3:38 PM CDT -----  Contact: self   Pt would like to reschedule his biopsy appointment tomorrow. He said he has other appointment going on and will not be able to make it for that appointment. Pt can be reach [phone]. Thanks!

## 2017-06-06 RX ORDER — SULFAMETHOXAZOLE AND TRIMETHOPRIM 800; 160 MG/1; MG/1
1 TABLET ORAL 2 TIMES DAILY
Qty: 6 TABLET | Refills: 0 | Status: SHIPPED | OUTPATIENT
Start: 2017-06-06 | End: 2017-06-09

## 2017-06-06 NOTE — PROGRESS NOTES
HPI:  Mr. Montgomery is status post ex fix removal and revision ORIF with allograft of left distal tibia fracture on 4/13/17.  He is doing well and has minimal pain.  His heel ulcer is being treated by his wife.  The  wound care nurse has not changed this in a while. It is filling in.  He remains NWB.    PE:  Incision well healed.  Heel ulcer continues to decrease in size.    Rads:  Hwr intact.  Good alignment.  Heel ulcer smaller.  Some fibrinous tissue.      A/P:  6 weeks status post revision fixation with removal of broken plate from originally open left dital tibial fracture.  His heel ulcer looks improved.  Chana Macdonald debrided this today and he will continue to use santyl but not Fabienne for now.  I will see him back in 5 weeks for another wound check.

## 2017-06-06 NOTE — TELEPHONE ENCOUNTER
Called and left message for patient to call us back regarding taking the bactrim prior to procedure.

## 2017-06-07 ENCOUNTER — LAB VISIT (OUTPATIENT)
Dept: LAB | Facility: HOSPITAL | Age: 72
End: 2017-06-07
Attending: INTERNAL MEDICINE
Payer: MEDICARE

## 2017-06-07 DIAGNOSIS — I10 ESSENTIAL HYPERTENSION, MALIGNANT: ICD-10-CM

## 2017-06-07 DIAGNOSIS — Z79.2 ENCOUNTER FOR LONG-TERM (CURRENT) USE OF ANTIBIOTICS: ICD-10-CM

## 2017-06-07 DIAGNOSIS — L03.116 CELLULITIS OF LEFT FOOT: ICD-10-CM

## 2017-06-07 DIAGNOSIS — T84.7XXA INFECTION/INFLAMMATION-INTERNAL ORTHO DEVICE: Primary | ICD-10-CM

## 2017-06-07 LAB
ALBUMIN SERPL BCP-MCNC: 3.6 G/DL
ALP SERPL-CCNC: 143 U/L
ALT SERPL W/O P-5'-P-CCNC: 28 U/L
ANION GAP SERPL CALC-SCNC: 11 MMOL/L
AST SERPL-CCNC: 18 U/L
BASOPHILS # BLD AUTO: 0.05 K/UL
BASOPHILS NFR BLD: 0.5 %
BILIRUB SERPL-MCNC: 0.6 MG/DL
BUN SERPL-MCNC: 23 MG/DL
CALCIUM SERPL-MCNC: 9.6 MG/DL
CHLORIDE SERPL-SCNC: 103 MMOL/L
CO2 SERPL-SCNC: 25 MMOL/L
CREAT SERPL-MCNC: 1.2 MG/DL
CRP SERPL-MCNC: 8.9 MG/L
DIFFERENTIAL METHOD: ABNORMAL
EOSINOPHIL # BLD AUTO: 0.3 K/UL
EOSINOPHIL NFR BLD: 3.2 %
ERYTHROCYTE [DISTWIDTH] IN BLOOD BY AUTOMATED COUNT: 14.4 %
ERYTHROCYTE [SEDIMENTATION RATE] IN BLOOD BY WESTERGREN METHOD: 54 MM/HR
EST. GFR  (AFRICAN AMERICAN): >60 ML/MIN/1.73 M^2
EST. GFR  (NON AFRICAN AMERICAN): >60 ML/MIN/1.73 M^2
GLUCOSE SERPL-MCNC: 80 MG/DL
HCT VFR BLD AUTO: 35.4 %
HGB BLD-MCNC: 12.1 G/DL
LYMPHOCYTES # BLD AUTO: 1.9 K/UL
LYMPHOCYTES NFR BLD: 17.7 %
MCH RBC QN AUTO: 32.2 PG
MCHC RBC AUTO-ENTMCNC: 34.2 %
MCV RBC AUTO: 94 FL
MONOCYTES # BLD AUTO: 1 K/UL
MONOCYTES NFR BLD: 9.2 %
NEUTROPHILS # BLD AUTO: 7.3 K/UL
NEUTROPHILS NFR BLD: 69.2 %
PLATELET # BLD AUTO: 182 K/UL
PMV BLD AUTO: 11.3 FL
POTASSIUM SERPL-SCNC: 4.6 MMOL/L
PROT SERPL-MCNC: 7.3 G/DL
RBC # BLD AUTO: 3.76 M/UL
SODIUM SERPL-SCNC: 139 MMOL/L
WBC # BLD AUTO: 10.61 K/UL

## 2017-06-07 PROCEDURE — 85651 RBC SED RATE NONAUTOMATED: CPT

## 2017-06-07 PROCEDURE — 80053 COMPREHEN METABOLIC PANEL: CPT

## 2017-06-07 PROCEDURE — 85025 COMPLETE CBC W/AUTO DIFF WBC: CPT

## 2017-06-07 PROCEDURE — 86140 C-REACTIVE PROTEIN: CPT

## 2017-06-09 RX ORDER — RAMIPRIL 5 MG/1
CAPSULE ORAL
Qty: 90 CAPSULE | Refills: 0 | Status: SHIPPED | OUTPATIENT
Start: 2017-06-09 | End: 2017-09-08 | Stop reason: SDUPTHER

## 2017-06-13 ENCOUNTER — TELEPHONE (OUTPATIENT)
Dept: INFECTIOUS DISEASES | Facility: CLINIC | Age: 72
End: 2017-06-13

## 2017-06-13 NOTE — TELEPHONE ENCOUNTER
Called patient with lab results. On doxycycline.   CRP 8.9  ESR 54 (down trending)  WBC wnl.   Renal function/liver function wnl    No answer.  Left voicemail with results and my contact information  Follow up scheduled for 7/5

## 2017-06-19 ENCOUNTER — TELEPHONE (OUTPATIENT)
Dept: FAMILY MEDICINE | Facility: CLINIC | Age: 72
End: 2017-06-19

## 2017-06-19 NOTE — TELEPHONE ENCOUNTER
----- Message from Lisa Adrian sent at 6/16/2017  4:11 PM CDT -----  Contact: Janey / [facility] is calling to inform clinic pt was unavailable for this week's visit.      Thanks

## 2017-06-26 ENCOUNTER — HOSPITAL ENCOUNTER (OUTPATIENT)
Dept: RADIOLOGY | Facility: HOSPITAL | Age: 72
Discharge: HOME | End: 2017-06-26
Attending: UROLOGY
Payer: MEDICARE

## 2017-06-26 ENCOUNTER — PROCEDURE VISIT (OUTPATIENT)
Dept: UROLOGY | Facility: CLINIC | Age: 72
End: 2017-06-26
Attending: UROLOGY
Payer: MEDICARE

## 2017-06-26 DIAGNOSIS — R97.20 ELEVATED PSA: ICD-10-CM

## 2017-06-26 DIAGNOSIS — C61 PROSTATE CANCER: Primary | ICD-10-CM

## 2017-06-26 PROCEDURE — 76942 ECHO GUIDE FOR BIOPSY: CPT | Mod: TC

## 2017-06-26 PROCEDURE — 55700 TRANSRECTAL ULTRASOUND: CPT | Mod: S$GLB,,, | Performed by: UROLOGY

## 2017-06-26 PROCEDURE — 88305 TISSUE EXAM BY PATHOLOGIST: CPT | Mod: 26,,, | Performed by: PATHOLOGY

## 2017-06-26 PROCEDURE — 76942 ECHO GUIDE FOR BIOPSY: CPT | Mod: 59,S$GLB,, | Performed by: UROLOGY

## 2017-06-26 PROCEDURE — 88305 TISSUE EXAM BY PATHOLOGIST: CPT | Performed by: PATHOLOGY

## 2017-06-26 PROCEDURE — 96372 THER/PROPH/DIAG INJ SC/IM: CPT | Mod: 59,S$GLB,, | Performed by: UROLOGY

## 2017-06-26 PROCEDURE — 76872 US TRANSRECTAL: CPT | Mod: S$GLB,,, | Performed by: UROLOGY

## 2017-06-26 RX ORDER — GENTAMICIN SULFATE 40 MG/ML
80 INJECTION, SOLUTION INTRAMUSCULAR; INTRAVENOUS
Status: COMPLETED | OUTPATIENT
Start: 2017-06-26 | End: 2017-06-26

## 2017-06-26 RX ADMIN — GENTAMICIN SULFATE 80 MG: 40 INJECTION, SOLUTION INTRAMUSCULAR; INTRAVENOUS at 11:06

## 2017-06-26 NOTE — PROGRESS NOTES
Name, , and allergies verified. Patient verbalized understanding of why they are here today and what medication they will be receiving.Gentamicin 80 mg given IM in right gluteus with no adverse effects. (see MAR) Patient tolerated procedure well.  Patient continued with biopsy as ordered by provider with tech by bedside. No acute distress noted upon exiting the procedure room.

## 2017-06-26 NOTE — PROCEDURES
TRUS  Date/Time: 6/26/2017 12:14 PM  Performed by: DEANNA DIAMOND  Authorized by: DEANNA DIAMOND     Total Biopsies:  12    Patient tolerance:  Patient tolerated the procedure well with no immediate complications         CLINICAL HISTORY: Elevated PSA/Prostate cancer.     Written informed consent obtained prior to procedure. Patient of Dr Minor.    OPERATIVE PROCEDURE: The patient was brought to the procedure room and after identification by name and number was placed supine on the procedure room table. Pre-operative PO antibiotics and enema was confirmed to be completed per protocol prior to commencement of procedure. He confirmed no use of anti-coagulations in the adriana-operative time. The patient was moved into the lateral decubitis position. Rectal numbing jelly was administered. IM gentamycin was given per protocol.    The transrectal ultrasound probe was inserted through the patient's anus and into his rectum without difficulty. The area of the pudendal nerve was identified bilaterally and injected with 3 mL of 1% lidocaine for nerve block bilaterally. Prostatic apex was also injected with 3 mL of 1% lidocaine bilaterally for further analgesia.    Measurement of the prostate revealed a prostate of approximately 33.0 grams in size. TRUS was carefully performed looking for any ultrasound abnormalities.    A series of prostate needle core biopsies was then performed. A total of 12 biopsies were taken - two each from base, mid, and apex of prostate on both left and right side. These specimens were labeled by side and location and sent to pathology for further analysis.     Transrectal ultrasound probe was removed. The patient tolerated the procedure well. He was then cleaned and returned to the supine position. Post-procedure instructions given.    ESTIMATED BLOOD LOSS: Minimal.     COMPLICATIONS: None.     FINDINGS:   1. 12 cores of prostate were removed for analysis.   2. 33.0 grams prostate by  ultrasound measurement.   3. PSA - 7.0  4. PSA density - 0.21    FOLLOWUP: The patient will follow up in 7 to 10 days for the biopsy   results with Dr Minor.    ____________________________  DEANNA DIAMOND MD

## 2017-06-27 ENCOUNTER — HOSPITAL ENCOUNTER (OUTPATIENT)
Dept: RADIOLOGY | Facility: HOSPITAL | Age: 72
Discharge: HOME | End: 2017-06-27
Attending: ORTHOPAEDIC SURGERY
Payer: MEDICARE

## 2017-06-27 ENCOUNTER — INFUSION (OUTPATIENT)
Dept: INFECTIOUS DISEASES | Facility: HOSPITAL | Age: 72
End: 2017-06-27
Attending: INTERNAL MEDICINE
Payer: MEDICARE

## 2017-06-27 ENCOUNTER — OFFICE VISIT (OUTPATIENT)
Dept: ORTHOPEDICS | Facility: CLINIC | Age: 72
End: 2017-06-27
Payer: MEDICARE

## 2017-06-27 ENCOUNTER — TELEPHONE (OUTPATIENT)
Dept: UROLOGY | Facility: CLINIC | Age: 72
End: 2017-06-27

## 2017-06-27 ENCOUNTER — PATIENT MESSAGE (OUTPATIENT)
Dept: UROLOGY | Facility: CLINIC | Age: 72
End: 2017-06-27

## 2017-06-27 ENCOUNTER — RESEARCH ENCOUNTER (OUTPATIENT)
Dept: RESEARCH | Facility: HOSPITAL | Age: 72
End: 2017-06-27

## 2017-06-27 ENCOUNTER — OFFICE VISIT (OUTPATIENT)
Dept: INFECTIOUS DISEASES | Facility: CLINIC | Age: 72
End: 2017-06-27
Payer: MEDICARE

## 2017-06-27 ENCOUNTER — HOSPITAL ENCOUNTER (EMERGENCY)
Facility: HOSPITAL | Age: 72
Discharge: HOME | End: 2017-06-27
Attending: EMERGENCY MEDICINE
Payer: MEDICARE

## 2017-06-27 VITALS
WEIGHT: 315 LBS | SYSTOLIC BLOOD PRESSURE: 137 MMHG | DIASTOLIC BLOOD PRESSURE: 63 MMHG | BODY MASS INDEX: 41.75 KG/M2 | HEIGHT: 73 IN | TEMPERATURE: 99 F | RESPIRATION RATE: 16 BRPM | HEART RATE: 74 BPM | OXYGEN SATURATION: 96 %

## 2017-06-27 VITALS — BODY MASS INDEX: 41.75 KG/M2 | HEIGHT: 73 IN | WEIGHT: 315 LBS

## 2017-06-27 VITALS
TEMPERATURE: 98 F | HEIGHT: 73 IN | WEIGHT: 315 LBS | HEART RATE: 73 BPM | DIASTOLIC BLOOD PRESSURE: 69 MMHG | SYSTOLIC BLOOD PRESSURE: 117 MMHG | BODY MASS INDEX: 41.75 KG/M2

## 2017-06-27 DIAGNOSIS — C61 PROSTATE CANCER: ICD-10-CM

## 2017-06-27 DIAGNOSIS — S82.402N TRAUMATIC TYPE III OPEN FRACTURE OF SHAFT OF LEFT TIBIA AND FIBULA WITH NONUNION: Primary | ICD-10-CM

## 2017-06-27 DIAGNOSIS — R31.9 HEMATURIA: ICD-10-CM

## 2017-06-27 DIAGNOSIS — S82.202N TRAUMATIC TYPE III OPEN FRACTURE OF SHAFT OF LEFT TIBIA AND FIBULA WITH NONUNION: Primary | ICD-10-CM

## 2017-06-27 DIAGNOSIS — E66.01 MORBID OBESITY WITH BMI OF 40.0-44.9, ADULT: ICD-10-CM

## 2017-06-27 DIAGNOSIS — S82.202F: ICD-10-CM

## 2017-06-27 DIAGNOSIS — S82.402F: ICD-10-CM

## 2017-06-27 DIAGNOSIS — R33.8 ACUTE URINARY RETENTION: Primary | ICD-10-CM

## 2017-06-27 LAB
ALBUMIN SERPL BCP-MCNC: 3.5 G/DL
ALP SERPL-CCNC: 129 U/L
ALT SERPL W/O P-5'-P-CCNC: 23 U/L
ANION GAP SERPL CALC-SCNC: 11 MMOL/L
AST SERPL-CCNC: 18 U/L
BACTERIA #/AREA URNS HPF: ABNORMAL /HPF
BASOPHILS # BLD AUTO: 0.06 K/UL
BASOPHILS NFR BLD: 0.5 %
BILIRUB SERPL-MCNC: 0.5 MG/DL
BILIRUB UR QL STRIP: NEGATIVE
BUN SERPL-MCNC: 22 MG/DL
CALCIUM SERPL-MCNC: 9.6 MG/DL
CHLORIDE SERPL-SCNC: 102 MMOL/L
CLARITY UR: CLEAR
CO2 SERPL-SCNC: 24 MMOL/L
COLOR UR: ABNORMAL
CREAT SERPL-MCNC: 1.5 MG/DL
DIFFERENTIAL METHOD: ABNORMAL
EOSINOPHIL # BLD AUTO: 0.4 K/UL
EOSINOPHIL NFR BLD: 3.1 %
ERYTHROCYTE [DISTWIDTH] IN BLOOD BY AUTOMATED COUNT: 14.2 %
EST. GFR  (AFRICAN AMERICAN): 54 ML/MIN/1.73 M^2
EST. GFR  (NON AFRICAN AMERICAN): 46 ML/MIN/1.73 M^2
GLUCOSE SERPL-MCNC: 115 MG/DL
GLUCOSE UR QL STRIP: NEGATIVE
HCT VFR BLD AUTO: 34.7 %
HGB BLD-MCNC: 11.6 G/DL
HGB UR QL STRIP: ABNORMAL
HYALINE CASTS #/AREA URNS LPF: 0 /LPF
KETONES UR QL STRIP: NEGATIVE
LEUKOCYTE ESTERASE UR QL STRIP: NEGATIVE
LYMPHOCYTES # BLD AUTO: 2.7 K/UL
LYMPHOCYTES NFR BLD: 24.1 %
MCH RBC QN AUTO: 31.9 PG
MCHC RBC AUTO-ENTMCNC: 33.4 %
MCV RBC AUTO: 95 FL
MICROSCOPIC COMMENT: ABNORMAL
MONOCYTES # BLD AUTO: 0.7 K/UL
MONOCYTES NFR BLD: 6.3 %
NEUTROPHILS # BLD AUTO: 7.4 K/UL
NEUTROPHILS NFR BLD: 65.9 %
NITRITE UR QL STRIP: NEGATIVE
PH UR STRIP: 7 [PH] (ref 5–8)
PLATELET # BLD AUTO: 172 K/UL
PMV BLD AUTO: 10.4 FL
POTASSIUM SERPL-SCNC: 4.4 MMOL/L
PROT SERPL-MCNC: 7.4 G/DL
PROT UR QL STRIP: ABNORMAL
RBC # BLD AUTO: 3.64 M/UL
RBC #/AREA URNS HPF: >100 /HPF (ref 0–4)
SODIUM SERPL-SCNC: 137 MMOL/L
SP GR UR STRIP: 1 (ref 1–1.03)
URN SPEC COLLECT METH UR: ABNORMAL
UROBILINOGEN UR STRIP-ACNC: NEGATIVE EU/DL
WBC # BLD AUTO: 11.29 K/UL
WBC #/AREA URNS HPF: 3 /HPF (ref 0–5)

## 2017-06-27 PROCEDURE — 51702 INSERT TEMP BLADDER CATH: CPT

## 2017-06-27 PROCEDURE — 87086 URINE CULTURE/COLONY COUNT: CPT

## 2017-06-27 PROCEDURE — 99024 POSTOP FOLLOW-UP VISIT: CPT | Mod: S$GLB,,, | Performed by: ORTHOPAEDIC SURGERY

## 2017-06-27 PROCEDURE — 99499 UNLISTED E&M SERVICE: CPT | Mod: S$GLB,,, | Performed by: INTERNAL MEDICINE

## 2017-06-27 PROCEDURE — 99999 PR PBB SHADOW E&M-EST. PATIENT-LVL III: CPT | Mod: PBBFAC,,, | Performed by: INTERNAL MEDICINE

## 2017-06-27 PROCEDURE — 99499 UNLISTED E&M SERVICE: CPT | Mod: S$GLB,,, | Performed by: ORTHOPAEDIC SURGERY

## 2017-06-27 PROCEDURE — 99283 EMERGENCY DEPT VISIT LOW MDM: CPT | Mod: 25

## 2017-06-27 PROCEDURE — 85025 COMPLETE CBC W/AUTO DIFF WBC: CPT

## 2017-06-27 PROCEDURE — 73610 X-RAY EXAM OF ANKLE: CPT | Mod: 26,LT,, | Performed by: RADIOLOGY

## 2017-06-27 PROCEDURE — 1157F ADVNC CARE PLAN IN RCRD: CPT | Mod: S$GLB,,, | Performed by: INTERNAL MEDICINE

## 2017-06-27 PROCEDURE — 1126F AMNT PAIN NOTED NONE PRSNT: CPT | Mod: S$GLB,,, | Performed by: INTERNAL MEDICINE

## 2017-06-27 PROCEDURE — 80053 COMPREHEN METABOLIC PANEL: CPT

## 2017-06-27 PROCEDURE — 1159F MED LIST DOCD IN RCRD: CPT | Mod: S$GLB,,, | Performed by: INTERNAL MEDICINE

## 2017-06-27 PROCEDURE — 99213 OFFICE O/P EST LOW 20 MIN: CPT | Mod: S$GLB,,, | Performed by: INTERNAL MEDICINE

## 2017-06-27 PROCEDURE — 81000 URINALYSIS NONAUTO W/SCOPE: CPT

## 2017-06-27 PROCEDURE — 99999 PR PBB SHADOW E&M-EST. PATIENT-LVL II: CPT | Mod: PBBFAC,,, | Performed by: ORTHOPAEDIC SURGERY

## 2017-06-27 NOTE — ED TRIAGE NOTES
Pt states he had a biopsy in his prostate earlier today and was urinating clots with hematuria today. Pt is afraid if he doesn't drink enough water today, he wont be able to clear the clots. Pt states before biopsy he did not have clots or a bloody urine. Pt has an appointment for x-ray for his left leg tomorrow. Pt has had no bowel movent in two days and c/o dark stools everyday.   No other complaints

## 2017-06-27 NOTE — ED PROVIDER NOTES
Encounter Date: 6/27/2017    SCRIBE #1 NOTE: I, Gordon Cannon , am scribing for, and in the presence of,  Helene Lam MD . I have scribed the following portions of the note - Other sections scribed: HPI, ROS, PE.       History     Chief Complaint   Patient presents with    Hematuria     pt states he is passing blood clots through his urine after a bx today. he is afraid the clots are going to be too big to pass.      CC:Hematuria    HPI: This 72 y.o. male presents to the ED c/o acute onset moderate difficulty urinating and hematuria following prostate biopsy on Monday 6/26/17 by Dr. Minor. Denies any fever, nausea, or vomiting after surgery. Denies appetite change, sore throat, shortness of breath, or any other associated symptoms. Pt reports appointment with Dr. Minor is scheduled for 7/6/17.     Of note, pt is undergoing tx by ortho for L foot fx.     Pt has a medical history of NAT (acute kidney injury) (3/19/2017); ALLERGIC RHINITIS; Anemia; Anxiety; Basal cell carcinoma; Basal cell carcinoma; Basal cell carcinoma (08/18/2014); Basal cell carcinoma (9/13/13); Basal cell carcinoma; Chronic rhinitis (5/3/2013); Chronic rhinitis (5/3/2013); Coronary artery disease involving native coronary artery of native heart without angina pectoris s/p RCA stent; Cortical cataract of both eyes (3/18/2016); Depression; Erectile dysfunction (3/24/2014); Erectile dysfunction (3/24/2014); Essential hypertension; GERD (gastroesophageal reflux disease) (7/25/2012); Gout, arthritis; Grade III open fracture of left tibia and fibula s/p ex-fix on 7/1/16 and ORIF of left tibia on 7/15 (7/6/2016); H/O: iritis; Helicobacter pylori (H. 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      The history is provided by the patient. No  was used.     Review of patient's allergies indicates:   Allergen Reactions    Ciprofloxacin Rash     Diffuse pruritic morbilliform rash developed 3/15/2017 after dose of cipro; previously in 2/2017 he had rash/fevers after initiation of cipro    Zosyn [piperacillin-tazobactam] Anaphylaxis     Diffuse pruritic morbilliform rash developed 3/15/2017.  Then, 430am dose on 3/16 and rash worsened with SOB/tachypnea but no hypoxemia.     Bacitracin Itching and Rash     Violaceous rash in area of topical Tx.      Past Medical History:   Diagnosis Date    NAT (acute kidney injury) 3/19/2017    ALLERGIC RHINITIS     Anemia     Anxiety     Basal cell carcinoma     forehead    Basal cell carcinoma     left eylid    Basal cell carcinoma 08/18/2014    left prox cheek    Basal cell carcinoma 9/13/13    Right anterior shoulder    Basal cell carcinoma     Chronic rhinitis 5/3/2013    Chronic rhinitis 5/3/2013    Coronary artery disease involving native coronary artery of native heart without angina pectoris s/p RCA stent     Cortical cataract of both eyes 3/18/2016    Depression     Erectile dysfunction 3/24/2014    Erectile dysfunction 3/24/2014    Essential hypertension     GERD (gastroesophageal reflux disease) 7/25/2012    Gout, arthritis     Grade III open fracture of left tibia and fibula s/p ex-fix on 7/1/16 and ORIF of left tibia on 7/15 7/6/2016  "   H/O: iritis     Helicobacter pylori (H. pylori) infection     Treated    Herpes simplex keratoconjunctivitis 9/30/2015    - on acyclovir - followed by opthalmology, Dr. Uribe     Herpes simplex keratoconjunctivitis 9/30/2015    - on acyclovir - followed by opthalmology, Dr. Uribe     Hyperkalemia 2/28/2017    Hyperlipidemia     Hypogonadism male     Hypogonadism male     Mixed anxiety and depressive disorder     Morbid obesity     Obstructive sleep apnea on CPAP     CPAP    Osteoarthritis of left knee 7/25/2012    Paroxysmal atrial fibrillation 7/6/2016    Primary osteoarthritis of left knee 7/25/2012    Prominent aorta 1/25/2016    "RESULTS: THE HEART IS MILDLY ENLARGED WITH A SLIGHTLY PROMINENT AORTA" - Xray Chest PA & Lateral 12-     Prostate cancer 2/15/2016    - followed by urology, Dr. Young     Prostate cancer 2/15/2016    - followed by urology, Dr. 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 Conteh    CATARACT EXTRACTION W/  INTRAOCULAR LENS IMPLANT Left 4/12/2016        EXTERNAL FIXATION TIBIAL FRACTURE Left 07/01/2016    ORIF TIBIA FRACTURE Left 07/15/2016    Squamous cell cancer removal x3 with Mohs surgery      TONSILLECTOMY      TOTAL KNEE ARTHROPLASTY  10/2012    trus/bx       Family History   Problem Relation Age of Onset    Skin cancer Father     Lung cancer Father     Cancer Father      smoker,     Alzheimer's disease Mother     Hypertension Mother     Cancer Sister      colon, lung cancer     Cancer " Brother      skin cancer, polypectomy     Peripheral vascular disease      Melanoma Neg Hx     Psoriasis Neg Hx     Lupus Neg Hx     Eczema Neg Hx     Amblyopia Neg Hx     Blindness Neg Hx     Cataracts Neg Hx     Diabetes Neg Hx     Glaucoma Neg Hx     Macular degeneration Neg Hx     Retinal detachment Neg Hx     Strabismus Neg Hx     Stroke Neg Hx     Thyroid disease Neg Hx     Acne Neg Hx      Social History   Substance Use Topics    Smoking status: Never Smoker    Smokeless tobacco: Never Used    Alcohol use Yes      Comment: occasionally     Review of Systems   Constitutional: Negative for appetite change and fever.   HENT: Negative for sore throat.    Eyes: Negative for visual disturbance.   Respiratory: Negative for shortness of breath.    Cardiovascular: Negative for chest pain.   Gastrointestinal: Negative for abdominal pain, nausea and vomiting.   Genitourinary: Positive for difficulty urinating and hematuria. Negative for dysuria.   Musculoskeletal: Negative for back pain and neck stiffness.   Skin: Negative for rash.   Neurological: Negative for weakness.   Hematological: Does not bruise/bleed easily.       Physical Exam     Initial Vitals [06/27/17 0039]   BP Pulse Resp Temp SpO2   (!) 145/77 77 16 98.8 °F (37.1 °C) 96 %      MAP       99.67         Physical Exam    Nursing note and vitals reviewed.  Constitutional: He appears well-developed and well-nourished. He is not diaphoretic. He is Obese . He is active. No distress.   HENT:   Head: Normocephalic and atraumatic.   Mouth/Throat: Oropharynx is clear and moist.   Eyes: Conjunctivae and EOM are normal. Pupils are equal, round, and reactive to light.   Neck: Normal range of motion. Neck supple.   Cardiovascular: Normal rate, regular rhythm and normal heart sounds.   Pulmonary/Chest: Effort normal and breath sounds normal. No respiratory distress. He has no wheezes. He has no rhonchi. He has no rales.   Abdominal: Soft. Normal  appearance and bowel sounds are normal. There is no tenderness.   Musculoskeletal: Normal range of motion. He exhibits edema (trace bilat LE).   Boot to left foot   Neurological: He is alert and oriented to person, place, and time. He has normal strength.   Skin: Skin is warm and dry.   Psychiatric: He has a normal mood and affect.         ED Course   Procedures  Labs Reviewed   CBC W/ AUTO DIFFERENTIAL - Abnormal; Notable for the following:        Result Value    RBC 3.64 (*)     Hemoglobin 11.6 (*)     Hematocrit 34.7 (*)     MCH 31.9 (*)     All other components within normal limits   COMPREHENSIVE METABOLIC PANEL - Abnormal; Notable for the following:     Glucose 115 (*)     Creatinine 1.5 (*)     eGFR if  54 (*)     eGFR if non  46 (*)     All other components within normal limits   CULTURE, URINE   CULTURE, URINE   URINALYSIS             Medical Decision Making:   History:   Old Medical Records: I decided to obtain old medical records.  Old Records Summarized: records from previous admission(s).  Initial Assessment:   This is an emergent evaluation of a 72 y.o. Male who is s/p prostate biopsy today who developed hematuria and difficulty voiding. Called Dr. Minor's office who advised him if symptoms did not improve to present to ED for eval + ?porter.  Differential Diagnosis:   Ddx includes urethral obstruction secondary to clot, NAT, anemia secondary to blood loss, other.  Clinical Tests:   Lab Tests: Ordered and Reviewed  ED Management:  Porter placed with significant return of pink/wine-colored urine.    Renal function shows creatinine 1.5, c/w preop. UA with RBCs but no leuks/nitrites. Sent for cx.    Patient will call Dr. Minor for f/u in AM. At present he is stable for d/c with porter to leg bag. Instructed on irrigating catheter by RN if no longer making urine. Return precautions discussed.            Scribe Attestation:   Scribe #1: I performed the above scribed service  and the documentation accurately describes the services I performed. I attest to the accuracy of the note.    Attending Attestation:           Physician Attestation for Scribe:  Physician Attestation Statement for Scribe #1: I, Helene Lam MD , reviewed documentation, as scribed by Gordon Cannon in my presence, and it is both accurate and complete.                 ED Course     Clinical Impression:   The primary encounter diagnosis was Acute urinary retention. A diagnosis of Hematuria was also pertinent to this visit.                           Helene Lam MD  06/27/17 0971
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
## 2017-06-27 NOTE — TELEPHONE ENCOUNTER
----- Message from Kavita Roque sent at 6/27/2017 10:20 AM CDT -----  Contact: Self  Pt requesting to speak to nurse regarding health. Please call [phone].

## 2017-06-27 NOTE — TELEPHONE ENCOUNTER
When he called last night, he was having blood with clots.  Is this still happening?  He can come in tomorrow to see Alva and a VOT.

## 2017-06-27 NOTE — TELEPHONE ENCOUNTER
Patient went to the ED with Urinary retention on Monday- he is requesting to have his catheter out by Thursday because he is going out of town on Thursday evening- he has a f/u with you on Thursday the 6th already scheduled- If you approve the catheter removal before the 6th- Ill have to put it on for tmrw bc Thursday is booked. Please advise.

## 2017-06-28 ENCOUNTER — OFFICE VISIT (OUTPATIENT)
Dept: UROLOGY | Facility: CLINIC | Age: 72
End: 2017-06-28
Payer: MEDICARE

## 2017-06-28 ENCOUNTER — OUTPATIENT CASE MANAGEMENT (OUTPATIENT)
Dept: ADMINISTRATIVE | Facility: OTHER | Age: 72
End: 2017-06-28

## 2017-06-28 VITALS
WEIGHT: 315 LBS | DIASTOLIC BLOOD PRESSURE: 58 MMHG | BODY MASS INDEX: 41.75 KG/M2 | HEIGHT: 73 IN | SYSTOLIC BLOOD PRESSURE: 118 MMHG

## 2017-06-28 DIAGNOSIS — R31.9 HEMATURIA: ICD-10-CM

## 2017-06-28 DIAGNOSIS — R33.9 URINARY RETENTION: Primary | ICD-10-CM

## 2017-06-28 PROCEDURE — 1157F ADVNC CARE PLAN IN RCRD: CPT | Mod: S$GLB,,, | Performed by: NURSE PRACTITIONER

## 2017-06-28 PROCEDURE — 99999 PR PBB SHADOW E&M-EST. PATIENT-LVL IV: CPT | Mod: PBBFAC,,, | Performed by: NURSE PRACTITIONER

## 2017-06-28 PROCEDURE — 1159F MED LIST DOCD IN RCRD: CPT | Mod: S$GLB,,, | Performed by: NURSE PRACTITIONER

## 2017-06-28 PROCEDURE — 99214 OFFICE O/P EST MOD 30 MIN: CPT | Mod: 25,S$GLB,, | Performed by: NURSE PRACTITIONER

## 2017-06-28 PROCEDURE — 51700 IRRIGATION OF BLADDER: CPT | Mod: S$GLB,,, | Performed by: NURSE PRACTITIONER

## 2017-06-28 NOTE — PROGRESS NOTES
"Subjective:       Patient ID: Janusz Montgomery Jr. is a 72 y.o. male who is an established patient of Dr. Minor though new to me was last seen in this office 6/26/2017    Chief Complaint:   No chief complaint on file.      Urinary Retention  Patient presented to ED on yesterday with c/o difficulty urinating and hematuria. He underwent prostate biopsy for prostate cancer on  Monday 6/26/17 by Dr. Minor. He reports hematuria with small clots during first void after biopsy. He reports straining to void as the day progressed d/t clots. Reports passing clots followed by a large volume of "burgundy" colored urine. He tells me that he presented to the ED because he was worried that his symptoms would get worse during the night d/t him not being able to drink as much water. Denies fever, flank pain, abdominal pain, dysuria, or n/v at this time    Patient currently does have a urinary catheter in place.  1300 ml of urine were drained when catheter was placed. UA showed > 100 RBCs and few bacteria. Urine cx was sent, results are pending. Prior to this event voiding symptoms consisted of nocturia x2 and urinary frequency. Prior treatments include Flomax. Recent medications that may have affected his voiding include none. He does not report prior hx of similar symptoms.    He is here today for VOT. He reports yellow urine to porter since 1030 am yesterday morning. He denies having any clots to urine since porter insertion. He denies pain and is wanting catheter to be removed because he is going out of town this weekend.        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  Review of patient's allergies indicates:   Allergen Reactions    Ciprofloxacin Rash     Diffuse pruritic morbilliform rash developed 3/15/2017 after dose of cipro; previously in 2/2017 he had rash/fevers after initiation of cipro    Zosyn [piperacillin-tazobactam] Anaphylaxis     Diffuse pruritic morbilliform rash developed 3/15/2017.  Then, 430am dose on 3/16 and rash worsened with SOB/tachypnea but no hypoxemia.     Bacitracin Itching and Rash     Violaceous rash in area of topical Tx.      Current Outpatient Prescriptions   Medication Sig    acyclovir (ZOVIRAX) 800 MG Tab Take 200 mg by mouth once daily.    artificial tears (ISOPTO TEARS) 0.5 % ophthalmic solution Place 1 drop into both eyes as needed. (Patient taking differently: Place 1 drop into both eyes as needed (for dry eyes). )    aspirin 325 MG tablet Take 1 tablet (325 mg total) by mouth once daily.    atorvastatin (LIPITOR) 40 MG tablet TAKE ONE TABLET BY MOUTH ONCE DAILY    cetirizine (ZYRTEC) 10 MG tablet Take 1 tablet (10 mg total) by mouth once daily. (Patient taking differently: Take 10 mg by mouth daily as needed. )    collagenase ointment Apply topically every Mon, Wed, Fri. Nursing to apply Santyl  nickel thick to wound bed and edges and cover with damp gauze (to activate santyl) daily and cover with telfa island dressing (mepore) over each pressure injury. Santyl has autolytic debridement properties and will promote healing.    CYANOCOBALAMIN,  VITAMIN B-12, (VITAMIN B-12 ORAL) Take 2,500 mcg by mouth once daily.    dextrose 5 % SolP 250 mL with vancomycin 1,000 mg SolR 1,500 mg Inject 1,500 mg into the vein once daily. 4/26/17=stop date.    diclofenac sodium 1 % Gel Apply 2 g topically once daily. Apply 2 g to left shoulder    diphenhydrAMINE (BENADRYL) 25 mg capsule Take 1 each (25 mg total) by mouth every 6 (six) hours as needed for Itching.    docusate sodium (COLACE) 100 MG capsule Take 1 capsule (100 mg total) by mouth 2 (two) times daily as needed for Constipation.    furosemide (LASIX) 20 MG tablet Take 1 tablet (20 mg total) by mouth 2 (two) times daily.    gabapentin (NEURONTIN) 300 MG capsule Take 1 capsule (300 mg total) by mouth 2 (two) times daily.    INV C. DIFFICILE VACCINE/PLACEBO Administer as directed. FOR INVESTIGATIONAL USE ONLY.    melatonin 3 mg Tab Take 6 mg by mouth nightly as needed (for sleep).     miconazole (MICOTIN) 2 % cream Apply topically 2 (two) times daily.    minocycline (MINOCIN,DYNACIN) 100 MG capsule Take 1 capsule (100 mg total) by mouth every 12 (twelve) hours.    oxycodone-acetaminophen (PERCOCET)  mg per tablet Take 1 tablet by mouth every 4 (four) hours as needed for Pain.    promethazine (PHENERGAN) 12.5 MG Tab Take 1 tablet (12.5 mg total) by mouth every 6 (six) hours as needed (for nausea).    ramipril (ALTACE) 5 MG capsule TAKE ONE CAPSULE BY MOUTH ONCE DAILY    tamsulosin (FLOMAX) 0.4 mg Cp24 Take 1 capsule (0.4 mg total) by mouth once daily.    venlafaxine (EFFEXOR) 75 MG tablet TAKE TWO TABLETS BY MOUTH TWICE DAILY    vitamin D 1000 units Tab Take 1 tablet (1,000 Units total) by mouth once daily.    zinc sulfate (ZINCATE) 220 (50) mg capsule Take 1 capsule (220 mg total) by mouth once daily.     No current facility-administered medications for this visit.        Review of Systems   Constitutional: Negative for activity change, chills, fatigue, fever and unexpected weight change.  "  Eyes: Negative for discharge, redness and visual disturbance.   Respiratory: Negative for cough, shortness of breath and wheezing.    Cardiovascular: Negative for chest pain and leg swelling.   Gastrointestinal: Negative for abdominal distention, abdominal pain, constipation, diarrhea, nausea and vomiting.   Genitourinary: Positive for difficulty urinating and hematuria. Negative for dysuria, flank pain, frequency and urgency.   Musculoskeletal: Negative for arthralgias, joint swelling and myalgias.   Skin: Negative for color change and rash.   Neurological: Negative for dizziness and light-headedness.   Psychiatric/Behavioral: Negative for behavioral problems and confusion. The patient is not nervous/anxious.        Objective:      Vitals:    06/28/17 0906   BP: (!) 118/58   Weight: (!) 152 kg (335 lb)   Height: 6' 1" (1.854 m)     Physical Exam   Constitutional: He is oriented to person, place, and time. He appears well-developed and well-nourished. He is cooperative.   HENT:   Head: Normocephalic and atraumatic.   Nose: Nose normal.   Eyes: Conjunctivae are normal. Right eye exhibits no discharge. Left eye exhibits no discharge.   Neck: Normal range of motion. Neck supple. No tracheal deviation present. No thyromegaly present.   Cardiovascular: Normal rate and regular rhythm.    Pulmonary/Chest: Effort normal. No respiratory distress. He has no wheezes.   Abdominal: Soft. He exhibits no distension. There is no hepatosplenomegaly. There is no tenderness. There is no CVA tenderness. No hernia.   Genitourinary:   Genitourinary Comments: Goodson with yellow urine, no clots   Musculoskeletal: He exhibits edema.        Right lower leg: He exhibits swelling.        Left lower leg: He exhibits swelling.   Boot to left foot   Neurological: He is alert and oriented to person, place, and time.   Skin: Skin is warm and dry. No rash noted. No erythema.     Psychiatric: He has a normal mood and affect. His behavior is normal. " Judgment normal.         Urine dipstick shows not done- Goodson in place    Voiding Trial: 240 cc instilled, 260 cc voided; good stream, no hematuria noted    Assessment:       1. Urinary retention    2. Hematuria          Plan:       1. Urinary retention  -Clot retention UR  -S/p prostate biopsy 6/26/17  - Voiding Trial: successful  -Adequate hydration  -Continue Flomax  -Patient instructed on return precautions    2. Hematuria  -Resolved  -Secondary to recent prostate biopsy            Return in about 8 days (around 7/6/2017) for Follow up.

## 2017-06-28 NOTE — PROGRESS NOTES
For your information:    The following patient has been assigned to Delfina Sykes RN with [facility] for high risk screening.    Reason: High Risk    Please contact [facility] at [phone] with any questions.    Thank you,  Melani Renee

## 2017-06-29 LAB — BACTERIA UR CULT: NO GROWTH

## 2017-06-29 RX ORDER — ATORVASTATIN CALCIUM 40 MG/1
40 TABLET, FILM COATED ORAL DAILY
Qty: 90 TABLET | Refills: 0 | Status: SHIPPED | OUTPATIENT
Start: 2017-06-29 | End: 2017-10-23 | Stop reason: SDUPTHER

## 2017-06-29 NOTE — PROGRESS NOTES
HPI:  Mr. Montgomery is status post ex fix removal and revision ORIF with allograft of left distal tibia fracture on 4/13/17.  He is doing well and has no pain.  His heel ulcer is being treated by his wife and he is followed in wound care clinic.  He remains NWB.  He's been on abx for the heel ulcer given the high stakes with the open fracture healing nearby.      ROS:  Patient denies constitutional symptoms, cardiac symptoms, respiratory symptoms, GI symptoms.  The remainder of the musculoskeletal ROS is included in the HPI.    PE:    AA&O x 4.  NAD  HEENT:  NCAT, sclera nonicteric  Lungs:  Respirations are equal and unlabored.  CV:  2+ bilateral upper and lower extremity pulses.    PE:  Incisions well healed.  Some swelling/edema today, but no erythema.  Heel ulcer continues to decrease in size with good base, dime sized at this point.      Rads:  Hwr intact.  Good alignment.  Defect filling in and fibular healing as well.      A/P:  10 weeks status post revision fixation with removal of broken plate from originally open left dital tibial fracture.  His heel ulcer continues to improve.   I am reluctant to begin WB too early given his history . He will begin to use the foot in the WC to scoot it, adding a little stress to the construct, but not ambulate.   I will see him back in clinic in 4 weeks with x-rays of the left ankle.

## 2017-07-05 ENCOUNTER — OFFICE VISIT (OUTPATIENT)
Dept: INFECTIOUS DISEASES | Facility: CLINIC | Age: 72
End: 2017-07-05
Payer: MEDICARE

## 2017-07-05 ENCOUNTER — RESEARCH ENCOUNTER (OUTPATIENT)
Dept: RESEARCH | Facility: HOSPITAL | Age: 72
End: 2017-07-05

## 2017-07-05 ENCOUNTER — INFUSION (OUTPATIENT)
Dept: INFECTIOUS DISEASES | Facility: HOSPITAL | Age: 72
End: 2017-07-05
Attending: INTERNAL MEDICINE
Payer: MEDICARE

## 2017-07-05 VITALS — BODY MASS INDEX: 41.75 KG/M2 | HEART RATE: 80 BPM | WEIGHT: 315 LBS | TEMPERATURE: 98 F | HEIGHT: 73 IN

## 2017-07-05 VITALS — BODY MASS INDEX: 41.75 KG/M2 | HEART RATE: 80 BPM | TEMPERATURE: 98 F | HEIGHT: 73 IN | WEIGHT: 315 LBS

## 2017-07-05 DIAGNOSIS — Z00.6 RESEARCH SUBJECT: ICD-10-CM

## 2017-07-05 DIAGNOSIS — E66.01 MORBID OBESITY WITH BMI OF 40.0-44.9, ADULT: ICD-10-CM

## 2017-07-05 DIAGNOSIS — S82.202N TRAUMATIC TYPE III OPEN FRACTURE OF SHAFT OF LEFT TIBIA AND FIBULA WITH NONUNION: Primary | ICD-10-CM

## 2017-07-05 DIAGNOSIS — L97.529 NON-PRESSURE CHRONIC ULCER OF OTHER PART OF LEFT FOOT WITH UNSPECIFIED SEVERITY: ICD-10-CM

## 2017-07-05 DIAGNOSIS — S82.402N TRAUMATIC TYPE III OPEN FRACTURE OF SHAFT OF LEFT TIBIA AND FIBULA WITH NONUNION: Primary | ICD-10-CM

## 2017-07-05 PROCEDURE — 99213 OFFICE O/P EST LOW 20 MIN: CPT | Mod: S$PBB,,, | Performed by: INTERNAL MEDICINE

## 2017-07-05 PROCEDURE — 99999 PR PBB SHADOW E&M-EST. PATIENT-LVL III: CPT | Mod: PBBFAC,,, | Performed by: INTERNAL MEDICINE

## 2017-07-05 PROCEDURE — 99499 UNLISTED E&M SERVICE: CPT | Mod: S$GLB,,, | Performed by: INTERNAL MEDICINE

## 2017-07-05 PROCEDURE — 1126F AMNT PAIN NOTED NONE PRSNT: CPT | Mod: S$GLB,,, | Performed by: NURSE PRACTITIONER

## 2017-07-05 PROCEDURE — 1159F MED LIST DOCD IN RCRD: CPT | Mod: S$GLB,,, | Performed by: NURSE PRACTITIONER

## 2017-07-05 PROCEDURE — 1159F MED LIST DOCD IN RCRD: CPT | Mod: ,,, | Performed by: INTERNAL MEDICINE

## 2017-07-05 PROCEDURE — 99499 UNLISTED E&M SERVICE: CPT | Mod: S$GLB,,, | Performed by: NURSE PRACTITIONER

## 2017-07-05 PROCEDURE — 1157F ADVNC CARE PLAN IN RCRD: CPT | Mod: ,,, | Performed by: INTERNAL MEDICINE

## 2017-07-05 PROCEDURE — 1157F ADVNC CARE PLAN IN RCRD: CPT | Mod: S$GLB,,, | Performed by: NURSE PRACTITIONER

## 2017-07-05 PROCEDURE — 1126F AMNT PAIN NOTED NONE PRSNT: CPT | Mod: ,,, | Performed by: INTERNAL MEDICINE

## 2017-07-05 PROCEDURE — 99212 OFFICE O/P EST SF 10 MIN: CPT | Mod: S$GLB,,, | Performed by: NURSE PRACTITIONER

## 2017-07-05 PROCEDURE — 99999 PR PBB SHADOW E&M-EST. PATIENT-LVL V: CPT | Mod: PBBFAC,,, | Performed by: NURSE PRACTITIONER

## 2017-07-05 NOTE — PROGRESS NOTES
Subjective:      Patient ID: Janusz Montgomery Jr. is a 72 y.o. male.    Chief Complaint:Research      History of Present Illness    Patient doing fairly well; no new complaints.  No fever nor chills; no diarrhea.    Review of Systems   Constitution: Negative for chills, decreased appetite, fever, weakness, malaise/fatigue, night sweats, weight gain and weight loss.   HENT: Negative for congestion, ear pain, headaches, hearing loss, hoarse voice, sore throat and tinnitus.    Eyes: Negative for blurred vision, redness and visual disturbance.   Cardiovascular: Negative for chest pain, leg swelling and palpitations.   Respiratory: Negative for cough, hemoptysis, shortness of breath and sputum production.    Hematologic/Lymphatic: Negative for adenopathy. Does not bruise/bleed easily.   Skin: Negative for dry skin, itching, rash and suspicious lesions.   Musculoskeletal: Negative for back pain, joint pain, myalgias and neck pain.   Gastrointestinal: Negative for abdominal pain, constipation, diarrhea, heartburn, nausea and vomiting.   Genitourinary: Negative for dysuria, flank pain, frequency, hematuria, hesitancy and urgency.   Neurological: Negative for dizziness, numbness and paresthesias.   Psychiatric/Behavioral: Negative for depression and memory loss. The patient does not have insomnia and is not nervous/anxious.      Objective:   Physical Exam   Constitutional: He is oriented to person, place, and time. He appears well-developed.   HENT:   Head: Normocephalic and atraumatic.   Eyes: Conjunctivae and EOM are normal. Pupils are equal, round, and reactive to light.   Neck: Normal range of motion. Neck supple. No thyromegaly present.   Cardiovascular: Normal rate, regular rhythm and normal heart sounds.    No murmur heard.  Pulmonary/Chest: Effort normal and breath sounds normal. He has no wheezes. He has no rales.   Abdominal: Soft. Bowel sounds are normal.   Musculoskeletal: Normal range of motion.    Lymphadenopathy:     He has no cervical adenopathy.   Neurological: He is alert and oriented to person, place, and time.   Skin: Skin is warm and dry.   Psychiatric: He has a normal mood and affect. His behavior is normal.   Vitals reviewed.    Assessment:       1. 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 Morbid obesity with BMI of 40.0-44.9, adult    3. Research subject          Plan:       1.  Labs and procedures per protocol.  2.  Vaccine per protocol.  3.  Follow up per protocol.

## 2017-07-05 NOTE — PATIENT INSTRUCTIONS
1.  Continue the minocycline for now until I hear from Dr. Cortés   2.  Will call you when you can stop antibiotics.   3.  Follow up in 1-2 months.  Will call you.   4.  Arrange follow up with Chana , wound care

## 2017-07-05 NOTE — PROGRESS NOTES
Subjective:      Patient ID: Janusz Montgomery Jr. is a 72 y.o. male.    Chief Complaint:Follow-up      History of Present Illness    {ID HPI BLOCKS:99922}    Review of Systems   Constitution: Negative for chills, decreased appetite, fever, weakness, malaise/fatigue, night sweats, weight gain and weight loss.   HENT: Negative for congestion, ear pain, headaches, hearing loss, hoarse voice, sore throat and tinnitus.    Eyes: Negative for blurred vision, redness and visual disturbance.   Cardiovascular: Negative for chest pain, leg swelling and palpitations.   Respiratory: Negative for cough, hemoptysis, shortness of breath and sputum production.    Hematologic/Lymphatic: Negative for adenopathy. Does not bruise/bleed easily.   Skin: Negative for dry skin, itching, rash and suspicious lesions.   Musculoskeletal: Negative for back pain, joint pain, myalgias and neck pain.   Gastrointestinal: Negative for abdominal pain, constipation, diarrhea, heartburn, nausea and vomiting.   Genitourinary: Negative for dysuria, flank pain, frequency, hematuria, hesitancy and urgency.   Neurological: Negative for dizziness, numbness and paresthesias.   Psychiatric/Behavioral: Negative for depression and memory loss. The patient does not have insomnia and is not nervous/anxious.      Objective:   Physical Exam  Assessment:       1. 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

## 2017-07-05 NOTE — PROGRESS NOTES
Subjectg # [phone]    Subject came to clinic on 6/5/2017 for the 2nd vaccination visit for the [employer] study. Research Coordinator- Humera Watts completed visit.  (IRB# [id number] PI: Maco).     Subject confirmed they are still willing to participate in the clinical study and any questions they might have were answered.    Subject has experienced 0 adverse events since the 1st vaccination, 9 days ago.  Subject returned their Diary Card 1 and it was reviewed with the . Concomitant medications were reviewed.    A physical exam was performed by Dr. Dewey, please see her note.      There were 0 temporary or definitive contraindication to the vaccination.    Subject's temperature was recorded 98.1 and subject reported no diarrhea on day of vaccination.    The study vaccine # [id number] was administered by unblinded study staff,  Clara Lepe RN.     Subject was kept for observation for 30 minutes by .  0 adverse reactions were noted.    Subject was given Diary Card #2 and a schedule of upcoming visits.   Subject will be contacted for 3rd vaccination visit appointment.     All of subject's questions were answered.

## 2017-07-05 NOTE — Clinical Note
Follow up on 8/10.  I am on hospital, but I'll see him around noon?  He already has a Derm appt. At 150.  Thanks.
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
## 2017-07-06 ENCOUNTER — OFFICE VISIT (OUTPATIENT)
Dept: UROLOGY | Facility: CLINIC | Age: 72
End: 2017-07-06
Payer: MEDICARE

## 2017-07-06 ENCOUNTER — OUTPATIENT CASE MANAGEMENT (OUTPATIENT)
Dept: ADMINISTRATIVE | Facility: OTHER | Age: 72
End: 2017-07-06

## 2017-07-06 VITALS — HEIGHT: 73 IN | BODY MASS INDEX: 41.75 KG/M2 | WEIGHT: 315 LBS

## 2017-07-06 DIAGNOSIS — N52.9 ED (ERECTILE DYSFUNCTION) OF ORGANIC ORIGIN: ICD-10-CM

## 2017-07-06 DIAGNOSIS — R35.1 NOCTURIA MORE THAN TWICE PER NIGHT: ICD-10-CM

## 2017-07-06 DIAGNOSIS — C61 PROSTATE CANCER: Primary | ICD-10-CM

## 2017-07-06 PROCEDURE — 1159F MED LIST DOCD IN RCRD: CPT | Mod: S$GLB,,, | Performed by: UROLOGY

## 2017-07-06 PROCEDURE — 99499 UNLISTED E&M SERVICE: CPT | Mod: S$GLB,,, | Performed by: UROLOGY

## 2017-07-06 PROCEDURE — 1157F ADVNC CARE PLAN IN RCRD: CPT | Mod: S$GLB,,, | Performed by: UROLOGY

## 2017-07-06 PROCEDURE — 1126F AMNT PAIN NOTED NONE PRSNT: CPT | Mod: S$GLB,,, | Performed by: UROLOGY

## 2017-07-06 PROCEDURE — 99214 OFFICE O/P EST MOD 30 MIN: CPT | Mod: S$GLB,,, | Performed by: UROLOGY

## 2017-07-06 PROCEDURE — 99999 PR PBB SHADOW E&M-EST. PATIENT-LVL III: CPT | Mod: PBBFAC,,, | Performed by: UROLOGY
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
## 2017-07-06 NOTE — PROGRESS NOTES
"Pt known to this RNCM. Pt states "everything is about the same". Pt states he is still unable to bear weight on his leg". Pt states he is doing exercises that were taught to him by PT. Pt is compliant with meds and appts. Pt denies any falls. Pt denies financial concerns. Pt declines OPCM at this time. Pt has OPCM contact information for any future needs.   "

## 2017-07-07 ENCOUNTER — LAB VISIT (OUTPATIENT)
Dept: LAB | Facility: HOSPITAL | Age: 72
End: 2017-07-07
Attending: INTERNAL MEDICINE
Payer: MEDICARE

## 2017-07-07 DIAGNOSIS — L89.624 STAGE IV PRESSURE ULCER OF LEFT HEEL: ICD-10-CM

## 2017-07-07 DIAGNOSIS — I25.10 CORONARY ATHEROSCLEROSIS OF UNSPECIFIED TYPE OF VESSEL, NATIVE OR GRAFT: Primary | ICD-10-CM

## 2017-07-07 DIAGNOSIS — Z79.82 ENCOUNTER FOR LONG-TERM (CURRENT) USE OF ASPIRIN: ICD-10-CM

## 2017-07-07 LAB
ALBUMIN SERPL BCP-MCNC: 3.3 G/DL
ALP SERPL-CCNC: 118 U/L
ALT SERPL W/O P-5'-P-CCNC: 18 U/L
ANION GAP SERPL CALC-SCNC: 6 MMOL/L
AST SERPL-CCNC: 14 U/L
BASOPHILS # BLD AUTO: 0.06 K/UL
BASOPHILS NFR BLD: 0.7 %
BILIRUB SERPL-MCNC: 0.5 MG/DL
BUN SERPL-MCNC: 23 MG/DL
CALCIUM SERPL-MCNC: 9.3 MG/DL
CHLORIDE SERPL-SCNC: 106 MMOL/L
CO2 SERPL-SCNC: 29 MMOL/L
CREAT SERPL-MCNC: 1.2 MG/DL
CRP SERPL-MCNC: 6.7 MG/L
DIFFERENTIAL METHOD: ABNORMAL
EOSINOPHIL # BLD AUTO: 0.3 K/UL
EOSINOPHIL NFR BLD: 3.1 %
ERYTHROCYTE [DISTWIDTH] IN BLOOD BY AUTOMATED COUNT: 14.4 %
ERYTHROCYTE [SEDIMENTATION RATE] IN BLOOD BY WESTERGREN METHOD: 46 MM/HR
EST. GFR  (AFRICAN AMERICAN): >60 ML/MIN/1.73 M^2
EST. GFR  (NON AFRICAN AMERICAN): >60 ML/MIN/1.73 M^2
GLUCOSE SERPL-MCNC: 132 MG/DL
HCT VFR BLD AUTO: 34.4 %
HGB BLD-MCNC: 11.3 G/DL
LYMPHOCYTES # BLD AUTO: 1.9 K/UL
LYMPHOCYTES NFR BLD: 21.9 %
MCH RBC QN AUTO: 31 PG
MCHC RBC AUTO-ENTMCNC: 32.8 %
MCV RBC AUTO: 95 FL
MONOCYTES # BLD AUTO: 0.4 K/UL
MONOCYTES NFR BLD: 4.7 %
NEUTROPHILS # BLD AUTO: 6 K/UL
NEUTROPHILS NFR BLD: 69.6 %
PLATELET # BLD AUTO: 158 K/UL
PMV BLD AUTO: 10.8 FL
POTASSIUM SERPL-SCNC: 4.2 MMOL/L
PROT SERPL-MCNC: 6.9 G/DL
RBC # BLD AUTO: 3.64 M/UL
SODIUM SERPL-SCNC: 141 MMOL/L
WBC # BLD AUTO: 8.68 K/UL

## 2017-07-07 PROCEDURE — 85025 COMPLETE CBC W/AUTO DIFF WBC: CPT

## 2017-07-07 PROCEDURE — 80053 COMPREHEN METABOLIC PANEL: CPT

## 2017-07-07 PROCEDURE — 36415 COLL VENOUS BLD VENIPUNCTURE: CPT

## 2017-07-07 PROCEDURE — 85651 RBC SED RATE NONAUTOMATED: CPT

## 2017-07-07 PROCEDURE — 86140 C-REACTIVE PROTEIN: CPT

## 2017-07-10 ENCOUNTER — CLINICAL SUPPORT (OUTPATIENT)
Dept: ELECTROPHYSIOLOGY | Facility: CLINIC | Age: 72
End: 2017-07-10
Payer: MEDICARE

## 2017-07-10 ENCOUNTER — TELEPHONE (OUTPATIENT)
Dept: ORTHOPEDICS | Facility: CLINIC | Age: 72
End: 2017-07-10

## 2017-07-10 DIAGNOSIS — I48.0 PAF (PAROXYSMAL ATRIAL FIBRILLATION): ICD-10-CM

## 2017-07-10 DIAGNOSIS — Z95.818 STATUS POST PLACEMENT OF IMPLANTABLE LOOP RECORDER: ICD-10-CM

## 2017-07-10 PROCEDURE — 93299 LOOP RECORDER REMOTE: CPT | Mod: S$GLB,,, | Performed by: INTERNAL MEDICINE

## 2017-07-10 PROCEDURE — 93297 REM INTERROG DEV EVAL ICPMS: CPT | Mod: S$GLB,,, | Performed by: INTERNAL MEDICINE

## 2017-07-10 NOTE — TELEPHONE ENCOUNTER
----- Message from Cy Kirk sent at 7/7/2017  3:44 PM CDT -----  Contact: Jan[facility] [place] [facility]  Janiya would like to speak with you regarding the pts wound care orders. Janiya can be reached at [phone].

## 2017-07-11 ENCOUNTER — OFFICE VISIT (OUTPATIENT)
Dept: ELECTROPHYSIOLOGY | Facility: CLINIC | Age: 72
End: 2017-07-11
Payer: MEDICARE

## 2017-07-11 ENCOUNTER — PATIENT MESSAGE (OUTPATIENT)
Dept: ORTHOPEDICS | Facility: CLINIC | Age: 72
End: 2017-07-11

## 2017-07-11 ENCOUNTER — TELEPHONE (OUTPATIENT)
Dept: INFECTIOUS DISEASES | Facility: HOSPITAL | Age: 72
End: 2017-07-11

## 2017-07-11 ENCOUNTER — PATIENT MESSAGE (OUTPATIENT)
Dept: INFECTIOUS DISEASES | Facility: CLINIC | Age: 72
End: 2017-07-11

## 2017-07-11 ENCOUNTER — RESEARCH ENCOUNTER (OUTPATIENT)
Dept: RESEARCH | Facility: HOSPITAL | Age: 72
End: 2017-07-11

## 2017-07-11 ENCOUNTER — HOSPITAL ENCOUNTER (OUTPATIENT)
Dept: CARDIOLOGY | Facility: CLINIC | Age: 72
Discharge: HOME | End: 2017-07-11
Payer: MEDICARE

## 2017-07-11 VITALS — HEIGHT: 73 IN | HEART RATE: 83 BPM | SYSTOLIC BLOOD PRESSURE: 128 MMHG | DIASTOLIC BLOOD PRESSURE: 82 MMHG

## 2017-07-11 DIAGNOSIS — I10 ESSENTIAL HYPERTENSION: ICD-10-CM

## 2017-07-11 DIAGNOSIS — I48.0 PAROXYSMAL ATRIAL FIBRILLATION: Primary | ICD-10-CM

## 2017-07-11 DIAGNOSIS — E66.01 MORBID OBESITY WITH BMI OF 40.0-44.9, ADULT: ICD-10-CM

## 2017-07-11 DIAGNOSIS — G47.33 OBSTRUCTIVE SLEEP APNEA ON CPAP: ICD-10-CM

## 2017-07-11 DIAGNOSIS — Z95.818 STATUS POST PLACEMENT OF IMPLANTABLE LOOP RECORDER: ICD-10-CM

## 2017-07-11 DIAGNOSIS — I25.10 CORONARY ARTERY DISEASE INVOLVING NATIVE CORONARY ARTERY OF NATIVE HEART WITHOUT ANGINA PECTORIS: ICD-10-CM

## 2017-07-11 DIAGNOSIS — L97.529 NON-PRESSURE CHRONIC ULCER OF OTHER PART OF LEFT FOOT WITH UNSPECIFIED SEVERITY: ICD-10-CM

## 2017-07-11 PROCEDURE — 99214 OFFICE O/P EST MOD 30 MIN: CPT | Mod: S$GLB,,, | Performed by: INTERNAL MEDICINE

## 2017-07-11 PROCEDURE — 1159F MED LIST DOCD IN RCRD: CPT | Mod: S$GLB,,, | Performed by: INTERNAL MEDICINE

## 2017-07-11 PROCEDURE — 1126F AMNT PAIN NOTED NONE PRSNT: CPT | Mod: S$GLB,,, | Performed by: INTERNAL MEDICINE

## 2017-07-11 PROCEDURE — 99999 PR PBB SHADOW E&M-EST. PATIENT-LVL III: CPT | Mod: PBBFAC,,, | Performed by: INTERNAL MEDICINE

## 2017-07-11 PROCEDURE — 1157F ADVNC CARE PLAN IN RCRD: CPT | Mod: S$GLB,,, | Performed by: INTERNAL MEDICINE

## 2017-07-11 PROCEDURE — 99499 UNLISTED E&M SERVICE: CPT | Mod: S$GLB,,, | Performed by: INTERNAL MEDICINE

## 2017-07-11 PROCEDURE — 93000 ELECTROCARDIOGRAM COMPLETE: CPT | Mod: S$GLB,,, | Performed by: INTERNAL MEDICINE

## 2017-07-11 NOTE — PROGRESS NOTES
[facility]  IRB# 2013.143.C  Subject # [id number]    , Mojgan Gold, ran into patient in [facility] this morning, 7/11/2017 and completed questions for follow-up telephone call. Patient has not had any changes in health (he was at hospital today for a regular visit with cardiologist) no ER visits or hospitalizations, no LSE, no systemic rxns, or injection site rxns, no ABX, no vaccines, and no anti-diarrheals. Patient reminded to call site if any LSE or if any changes in health. It was discussed with patient Visit 3 will be on 8/1/2017 following his appointment with Dr. Cortés in [place] at 11:00am. Subject reminded to bring all diary cards with him to his next appointment and continue to complete them if necessary.

## 2017-07-11 NOTE — TELEPHONE ENCOUNTER
Call to patient   CRP wnl.   Will stop doxycycline and observe.   Patient has follow up on 8/10.   Instructed to call immediately if anything worsens.   He still has not heard from wound care.   Will follow up.
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
## 2017-07-12 ENCOUNTER — PATIENT MESSAGE (OUTPATIENT)
Dept: INFECTIOUS DISEASES | Facility: CLINIC | Age: 72
End: 2017-07-12
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
## 2017-07-17 ENCOUNTER — OFFICE VISIT (OUTPATIENT)
Dept: ORTHOPEDICS | Facility: CLINIC | Age: 72
End: 2017-07-17
Payer: MEDICARE

## 2017-07-17 VITALS — WEIGHT: 315 LBS | HEIGHT: 73 IN | BODY MASS INDEX: 41.75 KG/M2

## 2017-07-17 DIAGNOSIS — M17.11 PRIMARY OSTEOARTHRITIS OF RIGHT KNEE: Primary | ICD-10-CM

## 2017-07-17 PROCEDURE — 99213 OFFICE O/P EST LOW 20 MIN: CPT | Mod: 25,S$GLB,, | Performed by: ORTHOPAEDIC SURGERY

## 2017-07-17 PROCEDURE — 20610 DRAIN/INJ JOINT/BURSA W/O US: CPT | Mod: RT,S$GLB,, | Performed by: ORTHOPAEDIC SURGERY

## 2017-07-17 PROCEDURE — 99999 PR PBB SHADOW E&M-EST. PATIENT-LVL II: CPT | Mod: PBBFAC,,, | Performed by: ORTHOPAEDIC SURGERY

## 2017-07-17 PROCEDURE — 1125F AMNT PAIN NOTED PAIN PRSNT: CPT | Mod: S$GLB,,, | Performed by: ORTHOPAEDIC SURGERY

## 2017-07-17 PROCEDURE — 1159F MED LIST DOCD IN RCRD: CPT | Mod: S$GLB,,, | Performed by: ORTHOPAEDIC SURGERY

## 2017-07-17 PROCEDURE — 1157F ADVNC CARE PLAN IN RCRD: CPT | Mod: S$GLB,,, | Performed by: ORTHOPAEDIC SURGERY

## 2017-07-17 RX ORDER — TRIAMCINOLONE ACETONIDE 40 MG/ML
40 INJECTION, SUSPENSION INTRA-ARTICULAR; INTRAMUSCULAR
Status: DISCONTINUED | OUTPATIENT
Start: 2017-07-17 | End: 2017-07-17 | Stop reason: HOSPADM

## 2017-07-17 RX ADMIN — TRIAMCINOLONE ACETONIDE 40 MG: 40 INJECTION, SUSPENSION INTRA-ARTICULAR; INTRAMUSCULAR at 03:07

## 2017-07-17 NOTE — PROCEDURES
Large Joint Aspiration/Injection  Date/Time: 7/17/2017 3:47 PM  Performed by: LINA FELIX  Authorized by: LINA FELIX     Consent Done?:  Yes (Verbal)  Indications:  Pain  Procedure site marked: Yes    Timeout: Prior to procedure the correct patient, procedure, and site was verified      Location:  Knee  Site:  R knee  Prep: Patient was prepped and draped in usual sterile fashion    Ultrasonic Guidance for needle placement: No  Needle size:  22 G  Approach:  Anteromedial  Medications:  40 mg triamcinolone acetonide 40 mg/mL
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
## 2017-07-19 ENCOUNTER — RESEARCH ENCOUNTER (OUTPATIENT)
Dept: RESEARCH | Facility: HOSPITAL | Age: 72
End: 2017-07-19

## 2017-07-19 NOTE — PROGRESS NOTES
[employer]. Diffense Vaccine Trial [id number]  IRB # [id number].[id number]/ P.I.: Maco  Subject #[id number]    Subject contacted by , Mojgan Gold, for follow-up telephone contact after last visit two on July 5, 2017. Patient confirms:  -no significant changes in health,  -no injection site reactions,  -no systemic reactions to vaccine,  -no ER visits or hospitalizations,   -no loose stool episodes  -no new reportable concom meds (such as abx, anti-diarrheals, or probiotics)  -no new vaccines    Subject reminded to complete diary card and call site if experiences any changes in health or has LSE that requires collection. Site reminded patient to bring all diaries to next appointment.

## 2017-07-24 ENCOUNTER — PATIENT MESSAGE (OUTPATIENT)
Dept: ADMINISTRATIVE | Facility: OTHER | Age: 72
End: 2017-07-24

## 2017-07-24 ENCOUNTER — PATIENT MESSAGE (OUTPATIENT)
Dept: INFECTIOUS DISEASES | Facility: CLINIC | Age: 72
End: 2017-07-24

## 2017-07-25 ENCOUNTER — OFFICE VISIT (OUTPATIENT)
Dept: PODIATRY | Facility: CLINIC | Age: 72
End: 2017-07-25
Payer: MEDICARE

## 2017-07-25 VITALS — BODY MASS INDEX: 41.75 KG/M2 | WEIGHT: 315 LBS | HEIGHT: 73 IN

## 2017-07-25 DIAGNOSIS — F41.8 MIXED ANXIETY AND DEPRESSIVE DISORDER: ICD-10-CM

## 2017-07-25 DIAGNOSIS — L97.422 CHRONIC HEEL ULCER, LEFT, WITH FAT LAYER EXPOSED: Primary | ICD-10-CM

## 2017-07-25 PROCEDURE — 99499 UNLISTED E&M SERVICE: CPT | Mod: S$GLB,,, | Performed by: PODIATRIST

## 2017-07-25 PROCEDURE — 1157F ADVNC CARE PLAN IN RCRD: CPT | Mod: S$GLB,,, | Performed by: PODIATRIST

## 2017-07-25 PROCEDURE — 99214 OFFICE O/P EST MOD 30 MIN: CPT | Mod: S$GLB,,, | Performed by: PODIATRIST

## 2017-07-25 PROCEDURE — 1159F MED LIST DOCD IN RCRD: CPT | Mod: S$GLB,,, | Performed by: PODIATRIST

## 2017-07-25 PROCEDURE — 99999 PR PBB SHADOW E&M-EST. PATIENT-LVL III: CPT | Mod: PBBFAC,,, | Performed by: PODIATRIST

## 2017-07-25 PROCEDURE — 1126F AMNT PAIN NOTED NONE PRSNT: CPT | Mod: S$GLB,,, | Performed by: PODIATRIST

## 2017-07-25 RX ORDER — VENLAFAXINE 75 MG/1
150 TABLET ORAL 2 TIMES DAILY
Qty: 360 TABLET | Refills: 0 | Status: SHIPPED | OUTPATIENT
Start: 2017-07-25 | End: 2017-12-11 | Stop reason: SDUPTHER

## 2017-07-25 NOTE — LETTER
July 25, 2017      Sherita Roberts, APRN, ANP  [address]  [facility] [address]           [facility]  [address]  [place] [address]  Phone: [phone]          Patient: Janusz Montgomery Jr.   MR Number: [id number]   YOB: 1945   Date of Visit: 7/25/2017       Dear Sherita Roberts:    Thank you for referring Janusz Montgomery to me for evaluation. Attached you will find relevant portions of my assessment and plan of care.    If you have questions, please do not hesitate to call me. I look forward to following Janusz Montgomery along with you.    Sincerely,    Sandra Gary DPM    Enclosure  CC:  No Recipients    If you would like to receive this communication electronically, please contact [email or web address] or [phone] to request more information on [employer] access.    For providers and/or their staff who would like to refer a patient to [facility], please contact us through our one-stop-shop provider referral line, [facility] , at [phone].    If you feel you have received this communication in error or would no longer like to receive these types of communications, please e-mail [email or web address]

## 2017-07-25 NOTE — PATIENT INSTRUCTIONS
Please keep football dressing clean, dry, and intact.  If dressing gets wet please contact our office.    Wear special shoe every time foot is placed on the floor.    Elevate affected foot as much as possible    Stay hydrated.      Nutrition and MyPlate: Protein Foods  This group includes foods that are high in protein. Protein helps the body build new cells and keeps tissues healthy. Most Americans get enough protein without even trying. It can be harder for vegetarians, but plenty of non-meat foods are rich in protein, too. Its best to get protein from a variety of sources.    Nutrient-Rich Choices  Theres a lot more to this food group than just meat and beans. It also includes nuts, seeds, and eggs. There are all sorts of nutrient-rich choices:  · Chicken and turkey with the skin removed  · Fish and shellfish  · Lean beef, pork, or lamb (without visible fat)  · Soy products, such as tofu, soybeans (edamame), tempeh, or soymilk  · Black beans, kidney beans, juárez beans, chickpeas (garbanzo beans), and lentils (Note: beans and peas count as both a protein and a vegetable)  · Peanuts, almonds, walnuts, sesame seeds, and sunflower  seeds, as well as foods made from these (such as peanut butter or tahini)  · Eggs and foods made with eggs (such as quiche or frittata)  What Makes Meat and Beans Less Healthy?  · Fatty meat is not healthy. Before you cook meat, trim off all the fat you can see. Chicken and turkey skin is also high in fat, and should be removed before cooking.  · Breading and frying make food less healthy. This includes dishes like fried chicken, fried fish, and refried beans.  · Sausage and lunch meats tend to be high in fat and salt. Buy low-fat, low-sodium versions.  One Small Change  Make a meal that includes a non-meat source of protein (such as tofu, lentils, or any other food listed above). Have a better idea? Write it here:  _____________________________________________________________  ©  4719-5481 [employer]. [address] [place] [address], [place] [address]. All rights reserved. This information is not intended as a substitute for professional medical care. Always follow your healthcare professional's instructions.

## 2017-07-25 NOTE — PROGRESS NOTES
Subjective:      Patient ID: Janusz Montgomery Jr. is a 72 y.o. male.    Chief Complaint: Wound Care (left foot heel pcp Dr. Weathers 4/28/17)    Janusz Montgomery Jr. is a 72 y.o. male   has a past medical history of NAT (acute kidney injury); ALLERGIC RHINITIS; Anemia; Anxiety; Basal cell carcinoma; Basal cell carcinoma; Basal cell carcinoma; Basal cell carcinoma; Basal cell carcinoma; Chronic rhinitis; Chronic rhinitis; Coronary artery disease involving native coronary artery of native heart without angina pectoris s/p RCA stent; Cortical cataract of both eyes; Depression; Erectile dysfunction; Erectile dysfunction; Essential hypertension; GERD (gastroesophageal reflux disease); Gout, arthritis; Grade III open fracture of left tibia and fibula s/p ex-fix on 7/1/16 and ORIF of left tibia on 7/15; H/O: iritis; Helicobacter pylori (H. 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 presents to the podiatry clinic for care of  left foot ulcer.   Location: heel Onset of the symptoms was several months ago. Current symptoms include: inability to bear weight and swelling. Signs of general infection none   Symptoms have progressed to a point and plateaued. Patient has had prior foot problems.  Treatment to date: 1 yr of abx, wound care, HH. Patients rates pain 0/10 on pain scale.    Shoe gear: moon boot left and tennis shoe right    Chief  Complaint   Patient presents with    Wound Care     left foot heel pcp Dr. eWathers 4/28/17       Hemoglobin A1C   Date Value Ref Range Status   07/11/2016 5.3 4.5 - 6.2 % Final     Comment:     According to ADA guidelines, hemoglobin A1C <7.0% represents  optimal control in non-pregnant diabetic patients.  Different  metrics may apply to specific populations.   Standards of Medical Care in Diabetes - 2016.  For the purpose of screening for the presence of diabetes:  <5.7%     Consistent with the absence of diabetes  5.7-6.4%  Consistent with increasing risk for diabetes   (prediabetes)  >or=6.5%  Consistent with diabetes  Currently no consensus exists for use of hemoglobin A1C  for diagnosis of diabetes for children.     03/31/2015 5.3 4.5 - 6.2 % Final   03/03/2014 5.6 4.5 - 6.2 % Final       Current shoe gear:  worn tennis shoes      Current Outpatient Prescriptions on File Prior to Visit   Medication Sig Dispense Refill    acyclovir (ZOVIRAX) 800 MG Tab Take 200 mg by mouth once daily.      artificial tears (ISOPTO TEARS) 0.5 % ophthalmic solution Place 1 drop into both eyes as needed. (Patient taking differently: Place 1 drop into both eyes as needed (for dry eyes). )      aspirin 325 MG tablet Take 1 tablet (325 mg total) by mouth once daily.  0    atorvastatin (LIPITOR) 40 MG tablet Take 1 tablet (40 mg total) by mouth once daily. 90 tablet 0    cetirizine (ZYRTEC) 10 MG tablet Take 1 tablet (10 mg total) by mouth once daily. (Patient taking differently: Take 10 mg by mouth daily as needed. )  0    collagenase ointment Apply topically every Mon, Wed, Fri. Nursing to apply Santyl  nickel thick to wound bed and edges and cover with damp gauze (to activate santyl) daily and cover with telfa island dressing (mepore) over each pressure injury. Santyl has autolytic debridement properties and will promote healing. 60 g 0    CYANOCOBALAMIN, VITAMIN B-12, (VITAMIN B-12 ORAL) Take 2,500 mcg by mouth once daily.       dextrose 5 % SolP 250 mL with vancomycin 1,000 mg SolR 1,500 mg Inject 1,500 mg into the vein once daily. 4/26/17=stop date.      diphenhydrAMINE (BENADRYL) 25 mg capsule Take 1 each (25 mg total) by mouth every 6 (six) hours as needed for Itching.  0    furosemide (LASIX) 20 MG tablet Take 1 tablet (20 mg total) by mouth 2 (two) times daily. 10 tablet 0    gabapentin (NEURONTIN) 300 MG capsule Take 1 capsule (300 mg total) by mouth 2 (two) times daily. 90 capsule 11    INV C. DIFFICILE VACCINE/PLACEBO Administer as directed. FOR INVESTIGATIONAL USE ONLY. 1 Syringe 0    INV C. DIFFICILE VACCINE/PLACEBO Administer as directed. FOR INVESTIGATIONAL USE ONLY. 1 Syringe 0    melatonin 3 mg Tab Take 6 mg by mouth nightly as needed (for sleep).       miconazole (MICOTIN) 2 % cream Apply topically 2 (two) times daily.  0    ramipril (ALTACE) 5 MG capsule TAKE ONE CAPSULE BY MOUTH ONCE DAILY 90 capsule 0    tamsulosin (FLOMAX) 0.4 mg Cp24 Take 1 capsule (0.4 mg total) by mouth once daily. 90 capsule 3    vitamin D 1000 units Tab Take 1 tablet (1,000 Units total) by mouth once daily.      zinc sulfate (ZINCATE) 220 (50) mg capsule Take 1 capsule (220 mg total) by mouth once daily.      [DISCONTINUED] minocycline (MINOCIN,DYNACIN) 100 MG capsule Take 1 capsule (100 mg total) by mouth every 12 (twelve) hours. 60 capsule 2    [DISCONTINUED] venlafaxine (EFFEXOR) 75 MG tablet TAKE TWO TABLETS BY MOUTH TWICE DAILY 360 tablet 0     No current facility-administered medications on file prior to visit.        Allergies   Allergen Reactions    Ciprofloxacin Rash     Diffuse pruritic morbilliform rash developed 3/15/2017 after dose of cipro; previously in 2/2017 he had rash/fevers after initiation of cipro    Zosyn [Piperacillin-Tazobactam] Anaphylaxis     Diffuse pruritic morbilliform rash developed 3/15/2017.  Then, 430am dose on 3/16 and rash worsened with SOB/tachypnea but no hypoxemia.     Bacitracin Itching  and Rash     Violaceous rash in area of topical Tx.        Past Surgical History:   Procedure Laterality Date    Cardiac stenting x2      CATARACT EXTRACTION W/  INTRAOCULAR LENS IMPLANT Right 3/29/2016    Dr. Conteh    CATARACT EXTRACTION W/  INTRAOCULAR LENS IMPLANT Left 4/12/2016        EXTERNAL FIXATION TIBIAL FRACTURE Left 07/01/2016    ORIF TIBIA FRACTURE Left 07/15/2016    Squamous cell cancer removal x3 with Mohs surgery      TONSILLECTOMY      TOTAL KNEE ARTHROPLASTY  10/2012    trus/bx         Family History   Problem Relation Age of Onset    Skin cancer Father     Lung cancer Father     Cancer Father      smoker,     Alzheimer's disease Mother     Hypertension Mother     Cancer Sister      colon, lung cancer     Cancer Brother      skin cancer, polypectomy     Peripheral vascular disease      Melanoma Neg Hx     Psoriasis Neg Hx     Lupus Neg Hx     Eczema Neg Hx     Amblyopia Neg Hx     Blindness Neg Hx     Cataracts Neg Hx     Diabetes Neg Hx     Glaucoma Neg Hx     Macular degeneration Neg Hx     Retinal detachment Neg Hx     Strabismus Neg Hx     Stroke Neg Hx     Thyroid disease Neg Hx     Acne Neg Hx        Social History     Social History    Marital status:      Spouse name: N/A    Number of children: N/A    Years of education: N/A     Occupational History    Retired       Social History Main Topics    Smoking status: Never Smoker    Smokeless tobacco: Never Used    Alcohol use Yes      Comment: occasionally    Drug use: No    Sexual activity: Yes     Other Topics Concern    Not on file     Social History Narrative    No narrative on file           Review of Systems   Constitution: Negative for chills, fever and weakness.   Cardiovascular: Negative for claudication and leg swelling.   Respiratory: Negative for cough and shortness of breath.    Skin: Positive for dry skin. Negative for itching, nail changes and rash.  "  Musculoskeletal: Positive for arthritis, joint pain and myalgias. Negative for falls, joint swelling and muscle weakness.   Gastrointestinal: Negative for diarrhea, nausea and vomiting.   Neurological: Positive for paresthesias. Negative for numbness and tremors.   Psychiatric/Behavioral: Negative for altered mental status and hallucinations.           Objective:       Vitals:    07/25/17 1329   Weight: (!) 154.2 kg (340 lb)   Height: 6' 1" (1.854 m)   PainSc: 0-No pain       Physical Exam   Constitutional:  Non-toxic appearance. He does not have a sickly appearance. No distress.   Pt. is well-developed, well-nourished, appears stated age, in no acute distress, alert and oriented x 3. No evidence of depression, anxiety, or agitation. Calm, cooperative, and communicative. Appropriate interactions and affect.   Cardiovascular:   Pulses:       Dorsalis pedis pulses are 2+ on the right side, and 2+ on the left side.        Posterior tibial pulses are 1+ on the right side, and 1+ on the left side.        Pulmonary/Chest: No respiratory distress.   Musculoskeletal:        Right ankle: He exhibits swelling. No tenderness. No lateral malleolus, no medial malleolus, no AITFL, no CF ligament, no head of 5th metatarsal and no proximal fibula tenderness found. Achilles tendon exhibits no pain, no defect and normal Zabala's test results.        Left ankle: He exhibits swelling. No tenderness. No lateral malleolus, no medial malleolus, no AITFL, no CF ligament and no posterior TFL tenderness found. Achilles tendon exhibits no pain, no defect and normal Zabala's test results.        Right foot: There is no tenderness and no bony tenderness.        Left foot: There is no tenderness and no bony tenderness.   Decreased stride, station of gait.  apropulsive toe off.  Increased angle and base of gait.    Patient has hammertoes of digits 2-5 bilateral partially reducible without symptom today.     There is equinus deformity " bilateral. There is limitation of dorsiflexion with knees extended and with knees flexed.    Shoes reveals lateral heel counter wear bilateral in athletic shoes.        Lymphadenopathy:   No lymphatic streaking    Negative lymphadenopathy bilateral popliteal fossa and tarsal tunnel.     Neurological:   Taylor-Jenn 5.07 monofilament is intact bilateral feet. Sharp/dull sensation is also intact Bilateral feet. Proprioception is grossly intact. Vibratory sensation intact (pt able to sense vibration stop within 3-5 seconds)     Skin: Skin is warm and dry. Lesion noted. No bruising, no burn, no laceration and no rash noted. He is not diaphoretic. There is erythema. No cyanosis. No pallor. Nails show no clubbing.   Ulcer location: posterior left heel  Signs of infection: local edema and erythema  Drainage: sanguinous  Periwound: intact  Base: granular and fibrinous   Psychiatric: His mood appears not anxious. His affect is not inappropriate. His speech is not slurred. He is not combative. He is communicative. He is attentive.   Nursing note reviewed.                Assessment:       Encounter Diagnosis   Name Primary?    Chronic heel ulcer, left, with fat layer exposed Yes         Plan:       Janusz was seen today for wound care.    Diagnoses and all orders for this visit:    Chronic heel ulcer, left, with fat layer exposed  -     US Lower Extrem Arteries Bilat with SAMANTHA; Future      I counseled the patient on his conditions, their implications and medical management.    Greater than 50% of this visit spent on counseling and coordination of care.    Greater than 20 minutes spent discussing wound healing cycle, healing, infection control, risk of bone infection. Adequate vitamin supplementation, protein intake, and hydration - discussed with patient    The wound is cleansed of foreign material as much as possible, and dressed. The patient is alerted to watch for any signs of infection (redness, pus, pain, increased  "swelling or fever) and call if such occurs.    Patient relates that he has a problem with "blood flow."  No arterial studies noted on file.  Arterial US ordered    Debridement: deferred  Dressings: endoform  Offloading: heel football and moon boot    Follow-up:Patient is to return to the clinic in one week for follow-up but should call [facility] immediately if any signs of infection, such as fever, chills, sweats, increased redness or pain.    Short-term goals include maintaining good offloading and minimizing bioburden, promoting granulation and epithelialization to healing.  Long-term goals include keeping the wound healed by good offloading and medical management under the direction of internist.      RTC in 8-10 weeks, sooner PRN        "

## 2017-07-28 DIAGNOSIS — S82.402N TRAUMATIC TYPE III OPEN FRACTURE OF SHAFT OF LEFT TIBIA AND FIBULA WITH NONUNION: Primary | ICD-10-CM

## 2017-07-28 DIAGNOSIS — S82.202N TRAUMATIC TYPE III OPEN FRACTURE OF SHAFT OF LEFT TIBIA AND FIBULA WITH NONUNION: Primary | ICD-10-CM

## 2017-07-28 NOTE — PT/OT/SLP EVAL
Occupational Therapy  Evaluation/Discharge    Janusz Montgomery Jr.   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   S/p ORIF POD#1    Past Medical History:   Diagnosis Date    NAT (acute kidney injury) 3/19/2017    ALLERGIC RHINITIS     Anemia     Anxiety     Basal cell carcinoma     forehead    Basal cell carcinoma     left eylid    Basal cell carcinoma 08/18/2014    left prox cheek    Basal cell carcinoma 9/13/13    Right anterior shoulder    Basal cell carcinoma     Chronic rhinitis 5/3/2013    Chronic rhinitis 5/3/2013    Coronary artery disease involving native coronary artery of native heart without angina pectoris s/p RCA stent     Cortical cataract of both eyes 3/18/2016    Depression     Erectile dysfunction 3/24/2014    Erectile dysfunction 3/24/2014    Essential hypertension     GERD (gastroesophageal reflux disease) 7/25/2012    Gout, arthritis     Grade III open fracture of left tibia and fibula s/p ex-fix on 7/1/16 and ORIF of left tibia on 7/15 7/6/2016    H/O: iritis     Helicobacter pylori (H. pylori) infection     Treated    Herpes simplex keratoconjunctivitis 9/30/2015    - on acyclovir - followed by opthalmology, Dr. Uribe     Herpes simplex keratoconjunctivitis 9/30/2015    - on acyclovir - followed by opthalmology, Dr. Uribe     Hyperkalemia 2/28/2017    Hyperlipidemia     Hypogonadism male     Hypogonadism male     Mixed anxiety and depressive disorder     Morbid obesity     Obstructive sleep apnea on CPAP     CPAP    Osteoarthritis of left knee 7/25/2012    Paroxysmal atrial fibrillation 7/6/2016    Primary osteoarthritis of left knee 7/25/2012     "Prominent aorta 1/25/2016    "RESULTS: THE HEART IS MILDLY ENLARGED WITH A SLIGHTLY PROMINENT AORTA" - Xray Chest PA & Lateral 12-     Prostate cancer 2/15/2016    - followed by urology, Dr. Young     Prostate cancer 2/15/2016    - followed by urology, Dr. 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 Conteh    CATARACT EXTRACTION W/  INTRAOCULAR LENS IMPLANT Left 4/12/2016        EXTERNAL FIXATION TIBIAL FRACTURE Left 07/01/2016    ORIF TIBIA FRACTURE Left 07/15/2016    Squamous cell cancer removal x3 with Mohs surgery      TONSILLECTOMY      TOTAL KNEE ARTHROPLASTY  10/2012    trus/bx         Referring physician: Mendoza Garnica MD  Date referred to OT:4/13/17    General Precautions: Standard, fall  Orthopedic Precautions: LLE non weight bearing  Braces: N/A    Do you have any cultural, spiritual, Baptist conflicts, given your current situation?: None     Patient History:  Living Environment  Lives With: spouse  Living Arrangements: house  Home Accessibility:  (ramp present at house, no concerns)  Home Layout: Able to live on 1st floor  Stair Railings at Home: none  Transportation Available: family or friend will provide  Living Environment Comment: Pt reports he lives with his wife in a H with ramp present. He states he has a tub/showert combo in the bathroom and grab bars in the bedroom/bathroom. PTA, he was NWB with ex fix, and has been performing scoot pivot transfers to w/c (I)'ly; and requires assist for bathing, LB dressing. He " was receiving HH OT/PT prior to admission, but was recently discharged.    Prior level of function:   Bed Mobility/Transfers: needs device  Grooming: independent  Bathing: needs assist  Upper Body Dressing: independent  Lower Body Dressing: needs assist  Toileting: needs device        Dominant hand: right    Subjective:  Communicated with RN prior to session.  Pt and family agreeable to OT/PT eval.  Chief Complaint: Pain  Patient/Family stated goals: Return home    Pain Rating: 3/10  Location - Side: Left  Location - Orientation: generalized  Location: ankle  Pain Addressed: Reposition, Distraction  Pain Rating Post-Intervention: 3/10    Objective:  Patient found with: wound vac    Cognitive Exam:  Oriented to: Person, Place, Time and Situation  Follows Commands/attention: Follows multistep  commands  Communication: clear/fluent  Memory:  No Deficits noted  Safety awareness/insight to disability: intact  Coping skills/emotional control: Appropriate to situation    Visual/perceptual:  Intact    Physical Exam:  Postural examination/scapula alignment: No postural abnormalities identified  Skin integrity: Wond vac in place on L ankle, L heel ulcer  Edema: Moderate in LLE at ankle    Sensation:   Intact    Upper Extremity Range of Motion:  Right Upper Extremity: WFL  Left Upper Extremity: WFL    Upper Extremity Strength:  Right Upper Extremity: WFL  Left Upper Extremity: WFL   Strength: WFL bilaterally    Fine motor coordination:   Intact    Gross motor coordination: WFL    Functional Mobility:  Bed Mobility:  Scooting/Bridging: Stand by Assistance (in sitting scooting to EOB)  Supine to Sit: Stand by Assistance    Transfers:  Sit <> Stand Assistance: Contact Guard Assistance  Sit <> Stand Assistive Device: Standard Walker  Bed <> Chair Technique: Squat Pivot  Bed <> Chair Transfer Assistance: Contact Guard Assistance  Bed <> Chair Assistive Device: No Assistive Device    Functional Ambulation: Pt did not ambulate  "today, but performed a squat pivot and sit to stand transfer.    Activities of Daily Living:    UE Dressing Level of Assistance: Set-up Assistance (to don t-shirt while seated EOB)    LE Dressing Level of Assistance: Moderate assistance (to don underwear and shorts, partially in sitting and standing to pull over hips)    Balance:   Static Sit: SBA  Dynamic Sit:SBA  Static Stand: CGA  Dynamic stand: CGA    Therapeutic Activities and Exercises:  - Pt educated on OT role and POC, as well as D/C rec of  OT/PT.  - Pt educated on LB dressing technique.    AM-PAC 6 CLICK ADL  How much help from another person does this patient currently need?  1 = Unable, Total/Dependent Assistance  2 = A lot, Maximum/Moderate Assistance  3 = A little, Minimum/Contact Guard/Supervision  4 = None, Modified [place]/Independent    Putting on and taking off regular lower body clothing? : 2  Bathing (including washing, rinsing, drying)?: 2  Toileting, which includes using toilet, bedpan, or urinal? : 3  Putting on and taking off regular upper body clothing?: 4  Taking care of personal grooming such as brushing teeth?: 4  Eating meals?: 4  Total Score: 19    AM-PAC Raw Score CMS "G-Code Modifier Level of Impairment Assistance   6 % Total / Unable   7 - 9 CM 80 - 100% Maximal Assist   10 - 14 CL 60 - 80% Moderate Assist   15 - 19 CK 40 - 60% Moderate Assist   20 - 22 CJ 20 - 40% Minimal Assist   23 CI 1-20% SBA / CGA   24 CH 0% Independent/ Mod I       Patient left up in chair with all lines intact and call button in reach    Assessment:  Janusz Montgomery Jr. is a 72 y.o. 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 He demo'd good mobility with performing sit to stand transfers with RW and for squat pivot transfers into chair. He has good family support. Pt D/C from hospital at time of this writer's documentation, so no " further OT needs warranted.    Rehab identified problem list/impairments: Rehab identified problem list/impairments: weakness, impaired endurance, impaired balance, impaired self care skills, impaired functional mobilty, decreased lower extremity function, orthopedic precautions, impaired joint extensibility, pain, edema    Rehab potential is excellent.    Activity tolerance: Excellent    Discharge recommendations: Discharge Facility/Level Of Care Needs: home health OT, home health PT     Barriers to discharge: Barriers to Discharge: None    Equipment recommendations: none (Pt has all needed equipment)     GOALS:   Occupational Therapy Goals     Not on file      Multidisciplinary Problems (Resolved)        Problem: Occupational Therapy Goal    Goal Priority Disciplines Outcome Interventions   Occupational Therapy Goal   (Resolved)     OT, PT/OT Outcome(s) achieved    Description:  No OT goals at this time.              PLAN: D/C acute care OT  Plan of Care reviewed with: patient, spouse         NIK Kenyon  04/14/2017   no difficulties

## 2017-07-31 ENCOUNTER — OFFICE VISIT (OUTPATIENT)
Dept: PODIATRY | Facility: CLINIC | Age: 72
End: 2017-07-31
Payer: MEDICARE

## 2017-07-31 VITALS — SYSTOLIC BLOOD PRESSURE: 116 MMHG | DIASTOLIC BLOOD PRESSURE: 58 MMHG

## 2017-07-31 DIAGNOSIS — L97.422 CHRONIC HEEL ULCER, LEFT, WITH FAT LAYER EXPOSED: Primary | ICD-10-CM

## 2017-07-31 PROCEDURE — 1159F MED LIST DOCD IN RCRD: CPT | Mod: S$GLB,,, | Performed by: PODIATRIST

## 2017-07-31 PROCEDURE — 99499 UNLISTED E&M SERVICE: CPT | Mod: S$GLB,,, | Performed by: PODIATRIST

## 2017-07-31 PROCEDURE — 99999 PR PBB SHADOW E&M-EST. PATIENT-LVL III: CPT | Mod: PBBFAC,,, | Performed by: PODIATRIST

## 2017-07-31 PROCEDURE — 1157F ADVNC CARE PLAN IN RCRD: CPT | Mod: S$GLB,,, | Performed by: PODIATRIST

## 2017-07-31 PROCEDURE — 99213 OFFICE O/P EST LOW 20 MIN: CPT | Mod: S$GLB,,, | Performed by: PODIATRIST

## 2017-07-31 NOTE — PATIENT INSTRUCTIONS
Please keep football dressing clean, dry, and intact.  If dressing gets wet please contact our office.    Wear special shoe every time foot is placed on the floor.    Elevate affected foot as much as possible    Stay hydrated.      Nutrition and MyPlate: Protein Foods  This group includes foods that are high in protein. Protein helps the body build new cells and keeps tissues healthy. Most Americans get enough protein without even trying. It can be harder for vegetarians, but plenty of non-meat foods are rich in protein, too. Its best to get protein from a variety of sources.    Nutrient-Rich Choices  Theres a lot more to this food group than just meat and beans. It also includes nuts, seeds, and eggs. There are all sorts of nutrient-rich choices:  · Chicken and turkey with the skin removed  · Fish and shellfish  · Lean beef, pork, or lamb (without visible fat)  · Soy products, such as tofu, soybeans (edamame), tempeh, or soymilk  · Black beans, kidney beans, juárez beans, chickpeas (garbanzo beans), and lentils (Note: beans and peas count as both a protein and a vegetable)  · Peanuts, almonds, walnuts, sesame seeds, and sunflower  seeds, as well as foods made from these (such as peanut butter or tahini)  · Eggs and foods made with eggs (such as quiche or frittata)  What Makes Meat and Beans Less Healthy?  · Fatty meat is not healthy. Before you cook meat, trim off all the fat you can see. Chicken and turkey skin is also high in fat, and should be removed before cooking.  · Breading and frying make food less healthy. This includes dishes like fried chicken, fried fish, and refried beans.  · Sausage and lunch meats tend to be high in fat and salt. Buy low-fat, low-sodium versions.  One Small Change  Make a meal that includes a non-meat source of protein (such as tofu, lentils, or any other food listed above). Have a better idea? Write it here:  _____________________________________________________________  ©  5419-5618 [employer]. [address] [place] [address], [place] [address]. All rights reserved. This information is not intended as a substitute for professional medical care. Always follow your healthcare professional's instructions.

## 2017-07-31 NOTE — PROGRESS NOTES
Subjective:      Patient ID: Janusz Montgomery Jr. is a 72 y.o. male.    Chief Complaint: Wound Care (pcp Dr. Weathers 4/28/17)    Janusz Montgomery Jr. is a 72 y.o. male returns to clinic for follow up of  left foot ulcers.  Patient has left football dressing clean, dry, intact.  Patient denies pain. No new pedal complaints.    Shoe gear: moon boot left and tennis shoe right    Chief Complaint   Patient presents with    Wound Care     pcp Dr. Weathers 4/28/17       Hemoglobin A1C   Date Value Ref Range Status   07/11/2016 5.3 4.5 - 6.2 % Final     Comment:     According to ADA guidelines, hemoglobin A1C <7.0% represents  optimal control in non-pregnant diabetic patients.  Different  metrics may apply to specific populations.   Standards of Medical Care in Diabetes - 2016.  For the purpose of screening for the presence of diabetes:  <5.7%     Consistent with the absence of diabetes  5.7-6.4%  Consistent with increasing risk for diabetes   (prediabetes)  >or=6.5%  Consistent with diabetes  Currently no consensus exists for use of hemoglobin A1C  for diagnosis of diabetes for children.     03/31/2015 5.3 4.5 - 6.2 % Final   03/03/2014 5.6 4.5 - 6.2 % Final       Current shoe gear:  worn tennis shoes      Current Outpatient Prescriptions on File Prior to Visit   Medication Sig Dispense Refill    acyclovir (ZOVIRAX) 800 MG Tab Take 200 mg by mouth once daily.      artificial tears (ISOPTO TEARS) 0.5 % ophthalmic solution Place 1 drop into both eyes as needed. (Patient taking differently: Place 1 drop into both eyes as needed (for dry eyes). )      aspirin 325 MG tablet Take 1 tablet (325 mg total) by mouth once daily.  0    atorvastatin (LIPITOR) 40 MG tablet Take 1 tablet (40 mg total) by mouth once daily. 90 tablet 0    cetirizine (ZYRTEC) 10 MG tablet Take 1 tablet (10 mg total) by mouth once daily. (Patient taking differently: Take 10 mg by mouth daily as needed. )  0    collagenase ointment Apply topically every  Mon, Wed, Fri. Nursing to apply Santyl  nickel thick to wound bed and edges and cover with damp gauze (to activate santyl) daily and cover with telfa island dressing (mepore) over each pressure injury. Santyl has autolytic debridement properties and will promote healing. 60 g 0    CYANOCOBALAMIN, VITAMIN B-12, (VITAMIN B-12 ORAL) Take 2,500 mcg by mouth once daily.      dextrose 5 % SolP 250 mL with vancomycin 1,000 mg SolR 1,500 mg Inject 1,500 mg into the vein once daily. 4/26/17=stop date.      diphenhydrAMINE (BENADRYL) 25 mg capsule Take 1 each (25 mg total) by mouth every 6 (six) hours as needed for Itching.  0    furosemide (LASIX) 20 MG tablet Take 1 tablet (20 mg total) by mouth 2 (two) times daily. 10 tablet 0    gabapentin (NEURONTIN) 300 MG capsule Take 1 capsule (300 mg total) by mouth 2 (two) times daily. 90 capsule 11    INV C. DIFFICILE VACCINE/PLACEBO Administer as directed. FOR INVESTIGATIONAL USE ONLY. 1 Syringe 0    INV C. DIFFICILE VACCINE/PLACEBO Administer as directed. FOR INVESTIGATIONAL USE ONLY. 1 Syringe 0    melatonin 3 mg Tab Take 6 mg by mouth nightly as needed (for sleep).       miconazole (MICOTIN) 2 % cream Apply topically 2 (two) times daily.  0    ramipril (ALTACE) 5 MG capsule TAKE ONE CAPSULE BY MOUTH ONCE DAILY 90 capsule 0    tamsulosin (FLOMAX) 0.4 mg Cp24 Take 1 capsule (0.4 mg total) by mouth once daily. 90 capsule 3    venlafaxine (EFFEXOR) 75 MG tablet Take 2 tablets (150 mg total) by mouth 2 (two) times daily. 360 tablet 0    vitamin D 1000 units Tab Take 1 tablet (1,000 Units total) by mouth once daily.      zinc sulfate (ZINCATE) 220 (50) mg capsule Take 1 capsule (220 mg total) by mouth once daily.       No current facility-administered medications on file prior to visit.        Allergies   Allergen Reactions    Ciprofloxacin Rash     Diffuse pruritic morbilliform rash developed 3/15/2017 after dose of cipro; previously in 2/2017 he had rash/fevers after  initiation of cipro    Zosyn [Piperacillin-Tazobactam] Anaphylaxis     Diffuse pruritic morbilliform rash developed 3/15/2017.  Then, 430am dose on 3/16 and rash worsened with SOB/tachypnea but no hypoxemia.     Bacitracin Itching and Rash     Violaceous rash in area of topical Tx.        Past Surgical History:   Procedure Laterality Date    Cardiac stenting x2      CATARACT EXTRACTION W/  INTRAOCULAR LENS IMPLANT Right 3/29/2016    Dr. Conteh    CATARACT EXTRACTION W/  INTRAOCULAR LENS IMPLANT Left 4/12/2016        EXTERNAL FIXATION TIBIAL FRACTURE Left 07/01/2016    ORIF TIBIA FRACTURE Left 07/15/2016    Squamous cell cancer removal x3 with Mohs surgery      TONSILLECTOMY      TOTAL KNEE ARTHROPLASTY  10/2012    trus/bx         Family History   Problem Relation Age of Onset    Skin cancer Father     Lung cancer Father     Cancer Father      smoker,     Alzheimer's disease Mother     Hypertension Mother     Cancer Sister      colon, lung cancer     Cancer Brother      skin cancer, polypectomy     Peripheral vascular disease      Melanoma Neg Hx     Psoriasis Neg Hx     Lupus Neg Hx     Eczema Neg Hx     Amblyopia Neg Hx     Blindness Neg Hx     Cataracts Neg Hx     Diabetes Neg Hx     Glaucoma Neg Hx     Macular degeneration Neg Hx     Retinal detachment Neg Hx     Strabismus Neg Hx     Stroke Neg Hx     Thyroid disease Neg Hx     Acne Neg Hx        Social History     Social History    Marital status:      Spouse name: N/A    Number of children: N/A    Years of education: N/A     Occupational History    Retired       Social History Main Topics    Smoking status: Never Smoker    Smokeless tobacco: Never Used    Alcohol use Yes      Comment: occasionally    Drug use: No    Sexual activity: Yes     Other Topics Concern    Not on file     Social History Narrative    No narrative on file           Review of Systems   Constitution: Negative for chills,  fever and weakness.   Cardiovascular: Negative for claudication and leg swelling.   Respiratory: Negative for cough and shortness of breath.    Skin: Positive for dry skin. Negative for itching, nail changes and rash.   Musculoskeletal: Positive for arthritis, joint pain and myalgias. Negative for falls, joint swelling and muscle weakness.   Gastrointestinal: Negative for diarrhea, nausea and vomiting.   Neurological: Positive for paresthesias. Negative for numbness and tremors.   Psychiatric/Behavioral: Negative for altered mental status and hallucinations.           Objective:       Vitals:    07/31/17 1332   BP: (!) 116/58       Physical Exam   Constitutional:  Non-toxic appearance. He does not have a sickly appearance. No distress.   Pt. is well-developed, well-nourished, appears stated age, in no acute distress, alert and oriented x 3. No evidence of depression, anxiety, or agitation. Calm, cooperative, and communicative. Appropriate interactions and affect.   Cardiovascular:   Pulses:       Dorsalis pedis pulses are 2+ on the right side, and 2+ on the left side.        Posterior tibial pulses are 1+ on the right side, and 1+ on the left side.        Pulmonary/Chest: No respiratory distress.   Musculoskeletal:        Right ankle: He exhibits swelling. No tenderness. No lateral malleolus, no medial malleolus, no AITFL, no CF ligament, no head of 5th metatarsal and no proximal fibula tenderness found. Achilles tendon exhibits no pain, no defect and normal Zabala's test results.        Left ankle: He exhibits swelling. No tenderness. No lateral malleolus, no medial malleolus, no AITFL, no CF ligament and no posterior TFL tenderness found. Achilles tendon exhibits no pain, no defect and normal Zabala's test results.        Right foot: There is no tenderness and no bony tenderness.        Left foot: There is no tenderness and no bony tenderness.   Decreased stride, station of gait.  apropulsive toe off.   Increased angle and base of gait.    Patient has hammertoes of digits 2-5 bilateral partially reducible without symptom today.     There is equinus deformity bilateral. There is limitation of dorsiflexion with knees extended and with knees flexed.    Shoes reveals lateral heel counter wear bilateral in athletic shoes.        Lymphadenopathy:   No lymphatic streaking    Negative lymphadenopathy bilateral popliteal fossa and tarsal tunnel.     Neurological:   [place] 5.07 monofilament is intact bilateral feet. Sharp/dull sensation is also intact Bilateral feet. Proprioception is grossly intact. Vibratory sensation intact (pt able to sense vibration stop within 3-5 seconds)     Skin: Skin is warm and dry. Lesion noted. No bruising, no burn, no laceration and no rash noted. He is not diaphoretic. There is erythema. No cyanosis. No pallor. Nails show no clubbing.   Ulcer location: posterior left heel  Signs of infection: local edema and erythema  Drainage: sanguinous  Periwound: intact  Base: granular and fibrinous   Psychiatric: His mood appears not anxious. His affect is not inappropriate. His speech is not slurred. He is not combative. He is communicative. He is attentive.   Nursing note reviewed.                      Assessment:       Encounter Diagnosis   Name Primary?    Chronic heel ulcer, left, with fat layer exposed Yes         Plan:       Janusz was seen today for wound care.    Diagnoses and all orders for this visit:    Chronic heel ulcer, left, with fat layer exposed      I counseled the patient on his conditions, their implications and medical management.    Greater than 50% of this visit spent on counseling and coordination of care.    Greater than 20 minutes spent discussing wound healing cycle, healing, infection control, risk of bone infection. Adequate vitamin supplementation, protein intake, and hydration - discussed with patient    The wound is cleansed of foreign material as much as  "possible, and dressed. The patient is alerted to watch for any signs of infection (redness, pus, pain, increased swelling or fever) and call if such occurs.    Patient relates that he has a problem with "blood flow."  No arterial studies noted on file.  Arterial US ordered    Debridement: deferred  Dressings: santyl hydrofera blue Offloading: heel football and moon boot    Follow-up:Patient is to return to the clinic in 1 week for follow-up but should call [facility] immediately if any signs of infection, such as fever, chills, sweats, increased redness or pain.    Short-term goals include maintaining good offloading and minimizing bioburden, promoting granulation and epithelialization to healing.  Long-term goals include keeping the wound healed by good offloading and medical management under the direction of internist.      "

## 2017-08-01 ENCOUNTER — HOSPITAL ENCOUNTER (OUTPATIENT)
Dept: RADIOLOGY | Facility: HOSPITAL | Age: 72
Discharge: HOME | End: 2017-08-01
Attending: ORTHOPAEDIC SURGERY
Payer: MEDICARE

## 2017-08-01 ENCOUNTER — OFFICE VISIT (OUTPATIENT)
Dept: INFECTIOUS DISEASES | Facility: CLINIC | Age: 72
End: 2017-08-01
Payer: MEDICARE

## 2017-08-01 ENCOUNTER — INFUSION (OUTPATIENT)
Dept: INFECTIOUS DISEASES | Facility: HOSPITAL | Age: 72
End: 2017-08-01
Attending: INTERNAL MEDICINE
Payer: MEDICARE

## 2017-08-01 ENCOUNTER — RESEARCH ENCOUNTER (OUTPATIENT)
Dept: RESEARCH | Facility: HOSPITAL | Age: 72
End: 2017-08-01

## 2017-08-01 ENCOUNTER — OFFICE VISIT (OUTPATIENT)
Dept: ORTHOPEDICS | Facility: CLINIC | Age: 72
End: 2017-08-01
Payer: MEDICARE

## 2017-08-01 VITALS
SYSTOLIC BLOOD PRESSURE: 112 MMHG | HEART RATE: 66 BPM | WEIGHT: 315 LBS | TEMPERATURE: 98 F | DIASTOLIC BLOOD PRESSURE: 56 MMHG | BODY MASS INDEX: 41.75 KG/M2 | HEIGHT: 73 IN

## 2017-08-01 VITALS — BODY MASS INDEX: 41.75 KG/M2 | HEIGHT: 73 IN | WEIGHT: 315 LBS

## 2017-08-01 DIAGNOSIS — Z00.6 RESEARCH SUBJECT: Primary | ICD-10-CM

## 2017-08-01 DIAGNOSIS — S82.402N TRAUMATIC TYPE III OPEN FRACTURE OF SHAFT OF LEFT TIBIA AND FIBULA WITH NONUNION: ICD-10-CM

## 2017-08-01 DIAGNOSIS — S82.202N TRAUMATIC TYPE III OPEN FRACTURE OF SHAFT OF LEFT TIBIA AND FIBULA WITH NONUNION: Primary | ICD-10-CM

## 2017-08-01 DIAGNOSIS — S82.202N TRAUMATIC TYPE III OPEN FRACTURE OF SHAFT OF LEFT TIBIA AND FIBULA WITH NONUNION: ICD-10-CM

## 2017-08-01 DIAGNOSIS — S82.402N TRAUMATIC TYPE III OPEN FRACTURE OF SHAFT OF LEFT TIBIA AND FIBULA WITH NONUNION: Primary | ICD-10-CM

## 2017-08-01 DIAGNOSIS — Z98.890 STATUS POST SURGERY: Primary | ICD-10-CM

## 2017-08-01 PROCEDURE — 1159F MED LIST DOCD IN RCRD: CPT | Mod: S$GLB,,, | Performed by: INTERNAL MEDICINE

## 2017-08-01 PROCEDURE — 1126F AMNT PAIN NOTED NONE PRSNT: CPT | Mod: S$GLB,,, | Performed by: INTERNAL MEDICINE

## 2017-08-01 PROCEDURE — 99213 OFFICE O/P EST LOW 20 MIN: CPT | Mod: S$GLB,,, | Performed by: INTERNAL MEDICINE

## 2017-08-01 PROCEDURE — 1126F AMNT PAIN NOTED NONE PRSNT: CPT | Mod: S$GLB,,, | Performed by: ORTHOPAEDIC SURGERY

## 2017-08-01 PROCEDURE — 1157F ADVNC CARE PLAN IN RCRD: CPT | Mod: S$GLB,,, | Performed by: ORTHOPAEDIC SURGERY

## 2017-08-01 PROCEDURE — 99214 OFFICE O/P EST MOD 30 MIN: CPT | Mod: S$GLB,,, | Performed by: ORTHOPAEDIC SURGERY

## 2017-08-01 PROCEDURE — 99999 PR PBB SHADOW E&M-EST. PATIENT-LVL III: CPT | Mod: PBBFAC,,, | Performed by: INTERNAL MEDICINE

## 2017-08-01 PROCEDURE — 99999 PR PBB SHADOW E&M-EST. PATIENT-LVL II: CPT | Mod: PBBFAC,,, | Performed by: ORTHOPAEDIC SURGERY

## 2017-08-01 PROCEDURE — 73610 X-RAY EXAM OF ANKLE: CPT | Mod: 26,LT,, | Performed by: RADIOLOGY

## 2017-08-01 PROCEDURE — 1157F ADVNC CARE PLAN IN RCRD: CPT | Mod: S$GLB,,, | Performed by: INTERNAL MEDICINE

## 2017-08-01 PROCEDURE — 99499 UNLISTED E&M SERVICE: CPT | Mod: S$GLB,,, | Performed by: ORTHOPAEDIC SURGERY

## 2017-08-01 PROCEDURE — 1159F MED LIST DOCD IN RCRD: CPT | Mod: S$GLB,,, | Performed by: ORTHOPAEDIC SURGERY

## 2017-08-01 NOTE — PROGRESS NOTES
Subjective:      Patient ID: Janusz Montgomery Jr. is a 72 y.o. male.    Chief Complaint:Research      History of Present Illness    Patient presents for his 3rd visit. No complaints - actually he feels better.    Review of Systems   Constitution: Negative for chills, decreased appetite, fever, weakness, malaise/fatigue, night sweats, weight gain and weight loss.   HENT: Negative for congestion, ear pain, headaches, hearing loss, hoarse voice, sore throat and tinnitus.    Eyes: Negative for blurred vision, redness and visual disturbance.   Cardiovascular: Negative for chest pain, leg swelling and palpitations.   Respiratory: Positive for shortness of breath. Negative for cough, hemoptysis and sputum production.    Hematologic/Lymphatic: Negative for adenopathy. Does not bruise/bleed easily.   Skin: Positive for dry skin. Negative for itching, rash and suspicious lesions.   Musculoskeletal: Negative for back pain, joint pain, myalgias and neck pain.   Gastrointestinal: Negative for abdominal pain, constipation, diarrhea, heartburn, nausea and vomiting.   Genitourinary: Positive for frequency. Negative for dysuria, flank pain, hematuria, hesitancy and urgency.   Neurological: Negative for dizziness, numbness and paresthesias.   Psychiatric/Behavioral: Negative for depression and memory loss. The patient does not have insomnia and is not nervous/anxious.      Objective:   Physical Exam   Constitutional: He is oriented to person, place, and time. He appears well-developed and well-nourished.   HENT:   Head: Normocephalic and atraumatic.   Right Ear: External ear normal.   Left Ear: External ear normal.   Mouth/Throat: Oropharynx is clear and moist.   Eyes: Conjunctivae and EOM are normal. Pupils are equal, round, and reactive to light.   Neck: Normal range of motion. Neck supple. No thyromegaly present.   Cardiovascular: Normal rate, regular rhythm and normal heart sounds.    No murmur heard.  Pulmonary/Chest: Effort  normal and breath sounds normal. He has no wheezes. He has no rales.   Abdominal: Soft. Bowel sounds are normal. He exhibits no mass. There is no tenderness. There is no rebound.   Musculoskeletal: Normal range of motion.   Lymphadenopathy:     He has no cervical adenopathy.   Neurological: He is alert and oriented to person, place, and time.   Skin: Skin is warm and dry.   Psychiatric: He has a normal mood and affect. His behavior is normal.   Vitals reviewed.    Assessment:       1. Research subject          Plan:       1.  Labs and procedures per protocol.  2.  Vaccine per protocol.  3.  Follow up per protocol.

## 2017-08-01 NOTE — PROGRESS NOTES
HPI:  Mr. 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  He is doing well and has no pain.  His heel ulcer is being followed in wound care clinic and healing in nicely.  We kept him on antibiotics for an extended period with the ulcer to avoid having issues with seeding the plate.  He is off of them now.  He remains NWB.      ROS:  Patient denies constitutional symptoms, cardiac symptoms, respiratory symptoms, GI symptoms.  The remainder of the musculoskeletal ROS is included in the HPI.    PE:    AA&O x 4.  NAD  HEENT:  NCAT, sclera nonicteric  Lungs:  Respirations are equal and unlabored.  CV:  2+ bilateral upper and lower extremity pulses.    PE:  Incisions well healed.  Some swelling/edema today, but no erythema.  Heel ulcer continues to decrease in size with good base, dime sized at this point.      Rads:  Hwr intact.  Good alignment.  Defect filling in and fibular healing as well, substantial improvement since prior films.    A/P:  4 months status post revision fixation with removal of broken plate from originally open left distal tibial fracture.  His heel ulcer continues to improve.   I am reluctant to begin WB too early given his history. He's been scooting in the WC using that foot and leaning on it a little when standing.  I told him to lean on it a little more, maybe up to 50% of his weight over the next few weeks.  I will see him back in 6 weeks with x-rays of the left ankle.  Sooner if he has any problems.

## 2017-08-01 NOTE — PROGRESS NOTES
[employer] C[employer] Vaccine Trial [id number]  IRB # [id number].C/ P.I.: Maco  Subject #[id number]    Subject came to clinic on 01Aug2017 for the 3rd vaccination visit for the [employer] study and seen by , Mojgan Gold      Subject confirmed they are still willing to participate in the clinical study and any questions they might have were answered.     The subject's demographics and concomitant medications reviewed for changes (to include new abx, probiotics, anti-diarrheals, or vaccines). No significant changes in health, No ER Visits or Hospitalizations, No LSE, and No new abx or vaccines.     Temperature obtained and documented in source. Please see Dr. Qureshi's note for physical exam and vitals.    Subject reported no diarrhea on the day of the vaccination and no fever noted.     Diary card #2 was collected and reviewed with subject.  Subject reported No AEs and has had No injection site or systemic reactions to the 2nd vaccine.      Subject received the 3rd vaccine by unblinded study staff, Clara Lepe.  The kit number was [id number].    Subject was kept under observation for 30 minutes after receiving injection and No adverse events were noted.  A diary card #3 was provided to the subject along with re-education on how to complete diary card if necessary. Subject still has MA-DE diary card and reminded of criteria to collect stool.     Subject was given upcoming visits dates and will return 05Sep2017 for their 4th visit. Site provided contact information to patient in case of any significant changes in health or LSE.     All questions were answered and patient left office in stable condition.

## 2017-08-07 ENCOUNTER — OFFICE VISIT (OUTPATIENT)
Dept: PODIATRY | Facility: CLINIC | Age: 72
End: 2017-08-07
Payer: MEDICARE

## 2017-08-07 VITALS — DIASTOLIC BLOOD PRESSURE: 54 MMHG | SYSTOLIC BLOOD PRESSURE: 98 MMHG

## 2017-08-07 DIAGNOSIS — L97.422 CHRONIC HEEL ULCER, LEFT, WITH FAT LAYER EXPOSED: Primary | ICD-10-CM

## 2017-08-07 PROCEDURE — 3074F SYST BP LT 130 MM HG: CPT | Mod: S$GLB,,, | Performed by: PODIATRIST

## 2017-08-07 PROCEDURE — 99213 OFFICE O/P EST LOW 20 MIN: CPT | Mod: S$GLB,,, | Performed by: PODIATRIST

## 2017-08-07 PROCEDURE — 99499 UNLISTED E&M SERVICE: CPT | Mod: S$GLB,,, | Performed by: PODIATRIST

## 2017-08-07 PROCEDURE — 1159F MED LIST DOCD IN RCRD: CPT | Mod: S$GLB,,, | Performed by: PODIATRIST

## 2017-08-07 PROCEDURE — 99999 PR PBB SHADOW E&M-EST. PATIENT-LVL II: CPT | Mod: PBBFAC,,, | Performed by: PODIATRIST

## 2017-08-07 PROCEDURE — 3078F DIAST BP <80 MM HG: CPT | Mod: S$GLB,,, | Performed by: PODIATRIST

## 2017-08-07 PROCEDURE — 1157F ADVNC CARE PLAN IN RCRD: CPT | Mod: S$GLB,,, | Performed by: PODIATRIST

## 2017-08-07 PROCEDURE — 3008F BODY MASS INDEX DOCD: CPT | Mod: S$GLB,,, | Performed by: PODIATRIST

## 2017-08-07 NOTE — PATIENT INSTRUCTIONS
Wound care Instructions:   · Once a day beginning in 72 hours::   ¨ Clean the toe separate from your body with warm running water and antibacterial soap such as dial for five minutes.  ¨ Clean any remaining crust away with soap and water using a cotton-tipped applicator.  ¨ DRY COMPLETELY  ¨ Apply Santyl daily  ¨ Cover with a breathable bandage until there is no more drainage or open flesh.  ¨ Change the dressing daily, or whenever it becomes wet or dirty.      When to seek medical advice  Call your health care provider right away if any of the following occur:  · Increasing redness, pain or swelling of the toe  · Red streaks in the skin leading away from the wound  · Continued pus or fluid drainage for more than 24 hours  · Fever of 100.4º F (38º C) or higher, or as directed by your health care provider

## 2017-08-07 NOTE — PROGRESS NOTES
Subjective:      Patient ID: Janusz Montgomery Jr. is a 72 y.o. male.    Chief Complaint: Wound Care (pcp Dr. Weathers 4/28/17)    Janusz Montgomery Jr. is a 72 y.o. male returns to clinic for follow up of  left foot ulcers.  Patient has left football dressing clean, dry, intact.  Patient denies pain. No new pedal complaints.    Shoe gear: moon boot left and tennis shoe right    Chief Complaint   Patient presents with    Wound Care     pcp Dr. Weathers 4/28/17       Hemoglobin A1C   Date Value Ref Range Status   07/11/2016 5.3 4.5 - 6.2 % Final     Comment:     According to ADA guidelines, hemoglobin A1C <7.0% represents  optimal control in non-pregnant diabetic patients.  Different  metrics may apply to specific populations.   Standards of Medical Care in Diabetes - 2016.  For the purpose of screening for the presence of diabetes:  <5.7%     Consistent with the absence of diabetes  5.7-6.4%  Consistent with increasing risk for diabetes   (prediabetes)  >or=6.5%  Consistent with diabetes  Currently no consensus exists for use of hemoglobin A1C  for diagnosis of diabetes for children.     03/31/2015 5.3 4.5 - 6.2 % Final   03/03/2014 5.6 4.5 - 6.2 % Final       Current shoe gear:  worn tennis shoes      Current Outpatient Prescriptions on File Prior to Visit   Medication Sig Dispense Refill    acyclovir (ZOVIRAX) 800 MG Tab Take 200 mg by mouth once daily.      artificial tears (ISOPTO TEARS) 0.5 % ophthalmic solution Place 1 drop into both eyes as needed. (Patient taking differently: Place 1 drop into both eyes as needed (for dry eyes). )      aspirin 325 MG tablet Take 1 tablet (325 mg total) by mouth once daily.  0    atorvastatin (LIPITOR) 40 MG tablet Take 1 tablet (40 mg total) by mouth once daily. 90 tablet 0    cetirizine (ZYRTEC) 10 MG tablet Take 1 tablet (10 mg total) by mouth once daily. (Patient taking differently: Take 10 mg by mouth daily as needed. )  0    collagenase ointment Apply topically every  Mon, Wed, Fri. Nursing to apply Santyl  nickel thick to wound bed and edges and cover with damp gauze (to activate santyl) daily and cover with telfa island dressing (mepore) over each pressure injury. Santyl has autolytic debridement properties and will promote healing. 60 g 0    CYANOCOBALAMIN, VITAMIN B-12, (VITAMIN B-12 ORAL) Take 2,500 mcg by mouth once daily.      dextrose 5 % SolP 250 mL with vancomycin 1,000 mg SolR 1,500 mg Inject 1,500 mg into the vein once daily. 4/26/17=stop date.      diphenhydrAMINE (BENADRYL) 25 mg capsule Take 1 each (25 mg total) by mouth every 6 (six) hours as needed for Itching.  0    furosemide (LASIX) 20 MG tablet Take 1 tablet (20 mg total) by mouth 2 (two) times daily. 10 tablet 0    gabapentin (NEURONTIN) 300 MG capsule Take 1 capsule (300 mg total) by mouth 2 (two) times daily. 90 capsule 11    INV C. DIFFICILE VACCINE/PLACEBO Administer as directed. FOR INVESTIGATIONAL USE ONLY. 1 Syringe 0    INV C. DIFFICILE VACCINE/PLACEBO Administer as directed. FOR INVESTIGATIONAL USE ONLY. 1 Syringe 0    INV C. DIFFICILE VACCINE/PLACEBO Administer as directed. FOR INVESTIGATIONAL USE ONLY. 1 Syringe 0    melatonin 3 mg Tab Take 6 mg by mouth nightly as needed (for sleep).       miconazole (MICOTIN) 2 % cream Apply topically 2 (two) times daily.  0    ramipril (ALTACE) 5 MG capsule TAKE ONE CAPSULE BY MOUTH ONCE DAILY 90 capsule 0    tamsulosin (FLOMAX) 0.4 mg Cp24 Take 1 capsule (0.4 mg total) by mouth once daily. 90 capsule 3    venlafaxine (EFFEXOR) 75 MG tablet Take 2 tablets (150 mg total) by mouth 2 (two) times daily. 360 tablet 0    vitamin D 1000 units Tab Take 1 tablet (1,000 Units total) by mouth once daily.      zinc sulfate (ZINCATE) 220 (50) mg capsule Take 1 capsule (220 mg total) by mouth once daily.       No current facility-administered medications on file prior to visit.        Allergies   Allergen Reactions    Ciprofloxacin Rash     Diffuse pruritic  morbilliform rash developed 3/15/2017 after dose of cipro; previously in 2/2017 he had rash/fevers after initiation of cipro    Zosyn [Piperacillin-Tazobactam] Anaphylaxis     Diffuse pruritic morbilliform rash developed 3/15/2017.  Then, 430am dose on 3/16 and rash worsened with SOB/tachypnea but no hypoxemia.     Bacitracin Itching and Rash     Violaceous rash in area of topical Tx.        Past Surgical History:   Procedure Laterality Date    Cardiac stenting x2      CATARACT EXTRACTION W/  INTRAOCULAR LENS IMPLANT Right 3/29/2016    Dr. Conteh    CATARACT EXTRACTION W/  INTRAOCULAR LENS IMPLANT Left 4/12/2016        EXTERNAL FIXATION TIBIAL FRACTURE Left 07/01/2016    ORIF TIBIA FRACTURE Left 07/15/2016    Squamous cell cancer removal x3 with Mohs surgery      TONSILLECTOMY      TOTAL KNEE ARTHROPLASTY  10/2012    trus/bx         Family History   Problem Relation Age of Onset    Skin cancer Father     Lung cancer Father     Cancer Father      smoker,     Alzheimer's disease Mother     Hypertension Mother     Cancer Sister      colon, lung cancer     Cancer Brother      skin cancer, polypectomy     Peripheral vascular disease      Melanoma Neg Hx     Psoriasis Neg Hx     Lupus Neg Hx     Eczema Neg Hx     Amblyopia Neg Hx     Blindness Neg Hx     Cataracts Neg Hx     Diabetes Neg Hx     Glaucoma Neg Hx     Macular degeneration Neg Hx     Retinal detachment Neg Hx     Strabismus Neg Hx     Stroke Neg Hx     Thyroid disease Neg Hx     Acne Neg Hx        Social History     Social History    Marital status:      Spouse name: N/A    Number of children: N/A    Years of education: N/A     Occupational History    Retired       Social History Main Topics    Smoking status: Never Smoker    Smokeless tobacco: Never Used    Alcohol use Yes      Comment: occasionally    Drug use: No    Sexual activity: Yes     Other Topics Concern    Not on file     Social  History Narrative    No narrative on file           Review of Systems   Constitution: Negative for chills, fever and weakness.   Cardiovascular: Negative for claudication and leg swelling.   Respiratory: Negative for cough and shortness of breath.    Skin: Positive for dry skin. Negative for itching, nail changes and rash.   Musculoskeletal: Positive for arthritis, joint pain and myalgias. Negative for falls, joint swelling and muscle weakness.   Gastrointestinal: Negative for diarrhea, nausea and vomiting.   Neurological: Positive for paresthesias. Negative for numbness and tremors.   Psychiatric/Behavioral: Negative for altered mental status and hallucinations.           Objective:       Vitals:    08/07/17 1347   BP: (!) 98/54       Physical Exam   Constitutional:  Non-toxic appearance. He does not have a sickly appearance. No distress.   Pt. is well-developed, well-nourished, appears stated age, in no acute distress, alert and oriented x 3. No evidence of depression, anxiety, or agitation. Calm, cooperative, and communicative. Appropriate interactions and affect.   Cardiovascular:   Pulses:       Dorsalis pedis pulses are 2+ on the right side, and 2+ on the left side.        Posterior tibial pulses are 1+ on the right side, and 1+ on the left side.        Pulmonary/Chest: No respiratory distress.   Musculoskeletal:        Right ankle: He exhibits swelling. No tenderness. No lateral malleolus, no medial malleolus, no AITFL, no CF ligament, no head of 5th metatarsal and no proximal fibula tenderness found. Achilles tendon exhibits no pain, no defect and normal Zabala's test results.        Left ankle: He exhibits swelling. No tenderness. No lateral malleolus, no medial malleolus, no AITFL, no CF ligament and no posterior TFL tenderness found. Achilles tendon exhibits no pain, no defect and normal Zabala's test results.        Right foot: There is no tenderness and no bony tenderness.        Left foot: There  is no tenderness and no bony tenderness.   Decreased stride, station of gait.  apropulsive toe off.  Increased angle and base of gait.    Patient has hammertoes of digits 2-5 bilateral partially reducible without symptom today.     There is equinus deformity bilateral. There is limitation of dorsiflexion with knees extended and with knees flexed.    Shoes reveals lateral heel counter wear bilateral in athletic shoes.        Lymphadenopathy:   No lymphatic streaking    Negative lymphadenopathy bilateral popliteal fossa and tarsal tunnel.     Neurological:   [place]-Jenn 5.07 monofilament is intact bilateral feet. Sharp/dull sensation is also intact Bilateral feet. Proprioception is grossly intact. Vibratory sensation intact (pt able to sense vibration stop within 3-5 seconds)     Skin: Skin is warm and dry. Lesion noted. No bruising, no burn, no laceration and no rash noted. He is not diaphoretic. There is erythema. No cyanosis. No pallor. Nails show no clubbing.   Ulcer location: posterior left heel  Signs of infection: local edema and erythema  Drainage: sanguinous  Periwound: intact  Base: granular and fibrinous   Psychiatric: His mood appears not anxious. His affect is not inappropriate. His speech is not slurred. He is not combative. He is communicative. He is attentive.   Nursing note reviewed.                              Assessment:       Encounter Diagnosis   Name Primary?    Chronic heel ulcer, left, with fat layer exposed Yes         Plan:       Janusz was seen today for wound care.    Diagnoses and all orders for this visit:    Chronic heel ulcer, left, with fat layer exposed      I counseled the patient on his conditions, their implications and medical management.    The wound is cleansed of foreign material as much as possible, and dressed. The patient is alerted to watch for any signs of infection (redness, pus, pain, increased swelling or fever) and call if such occurs.    Patient relates that he  "has a problem with "blood flow." Arterial US pending    Debridement: deferred  Dressings: iodosorb   Offloading: heel football and moon boot    Home wound care orders dispensed    Follow-up:Patient is to return to the clinic in 2 weeks for follow-up but should call [facility] immediately if any signs of infection, such as fever, chills, sweats, increased redness or pain.    Short-term goals include maintaining good offloading and minimizing bioburden, promoting granulation and epithelialization to healing.  Long-term goals include keeping the wound healed by good offloading and medical management under the direction of internist.      "

## 2017-08-08 ENCOUNTER — CLINICAL SUPPORT (OUTPATIENT)
Dept: ELECTROPHYSIOLOGY | Facility: CLINIC | Age: 72
End: 2017-08-08
Payer: MEDICARE

## 2017-08-08 ENCOUNTER — RESEARCH ENCOUNTER (OUTPATIENT)
Dept: RESEARCH | Facility: HOSPITAL | Age: 72
End: 2017-08-08

## 2017-08-08 DIAGNOSIS — Z95.818 STATUS POST PLACEMENT OF IMPLANTABLE LOOP RECORDER: ICD-10-CM

## 2017-08-08 DIAGNOSIS — I48.0 PAF (PAROXYSMAL ATRIAL FIBRILLATION): ICD-10-CM

## 2017-08-08 PROCEDURE — 93297 REM INTERROG DEV EVAL ICPMS: CPT | Mod: S$GLB,,, | Performed by: INTERNAL MEDICINE

## 2017-08-08 PROCEDURE — 93299 LOOP RECORDER REMOTE: CPT | Mod: S$GLB,,, | Performed by: INTERNAL MEDICINE

## 2017-08-08 NOTE — PROGRESS NOTES
[employer]. Diffense Vaccine Trial [id number]  IRB # [id number]/ P.I.: Maco  Subject #[id number]    Subject contacted by , Joanie Calix, on 07Aug2017 for follow-up telephone contact after last visit two. Patient confirms:  -no significant changes in health,  -no injection site reactions,  -no systemic reactions to vaccine,  -no ER visits or hospitalizations,   -no loose stool episodes  -no new reportable concom meds (such as abx, anti-diarrheals, or probiotics)  -no new vaccines    Subject reminded to complete diary card and call site if experiences any changes in health or has LSE that requires collection. Site reminded patient to bring all diaries to next appointment.

## 2017-08-09 ENCOUNTER — HOSPITAL ENCOUNTER (OUTPATIENT)
Dept: RADIOLOGY | Facility: HOSPITAL | Age: 72
Discharge: HOME | End: 2017-08-09
Attending: PODIATRIST
Payer: MEDICARE

## 2017-08-09 DIAGNOSIS — L97.422 CHRONIC HEEL ULCER, LEFT, WITH FAT LAYER EXPOSED: ICD-10-CM

## 2017-08-09 PROCEDURE — 93922 UPR/L XTREMITY ART 2 LEVELS: CPT | Mod: 26,,, | Performed by: RADIOLOGY

## 2017-08-09 PROCEDURE — 93925 LOWER EXTREMITY STUDY: CPT | Mod: TC

## 2017-08-09 PROCEDURE — 93925 LOWER EXTREMITY STUDY: CPT | Mod: 26,,, | Performed by: RADIOLOGY

## 2017-08-10 ENCOUNTER — OFFICE VISIT (OUTPATIENT)
Dept: DERMATOLOGY | Facility: CLINIC | Age: 72
End: 2017-08-10
Payer: MEDICARE

## 2017-08-10 ENCOUNTER — OFFICE VISIT (OUTPATIENT)
Dept: INFECTIOUS DISEASES | Facility: CLINIC | Age: 72
End: 2017-08-10
Payer: MEDICARE

## 2017-08-10 VITALS
SYSTOLIC BLOOD PRESSURE: 117 MMHG | HEART RATE: 68 BPM | HEIGHT: 73 IN | TEMPERATURE: 98 F | DIASTOLIC BLOOD PRESSURE: 67 MMHG | WEIGHT: 315 LBS | BODY MASS INDEX: 41.75 KG/M2

## 2017-08-10 DIAGNOSIS — L82.1 SK (SEBORRHEIC KERATOSIS): ICD-10-CM

## 2017-08-10 DIAGNOSIS — L82.0 INFLAMED SEBORRHEIC KERATOSIS: ICD-10-CM

## 2017-08-10 DIAGNOSIS — S82.402N TRAUMATIC TYPE III OPEN FRACTURE OF SHAFT OF LEFT TIBIA AND FIBULA WITH NONUNION: Primary | ICD-10-CM

## 2017-08-10 DIAGNOSIS — L73.8 SEBACEOUS GLAND HYPERPLASIA: ICD-10-CM

## 2017-08-10 DIAGNOSIS — L97.529 NON-PRESSURE CHRONIC ULCER OF OTHER PART OF LEFT FOOT WITH UNSPECIFIED SEVERITY: ICD-10-CM

## 2017-08-10 DIAGNOSIS — L57.8 CHRONIC SOLAR DERMATITIS: ICD-10-CM

## 2017-08-10 DIAGNOSIS — L57.0 AK (ACTINIC KERATOSIS): Primary | ICD-10-CM

## 2017-08-10 DIAGNOSIS — S82.202N TRAUMATIC TYPE III OPEN FRACTURE OF SHAFT OF LEFT TIBIA AND FIBULA WITH NONUNION: Primary | ICD-10-CM

## 2017-08-10 DIAGNOSIS — Z85.828 PERSONAL HISTORY OF OTHER MALIGNANT NEOPLASM OF SKIN: ICD-10-CM

## 2017-08-10 PROCEDURE — 99212 OFFICE O/P EST SF 10 MIN: CPT | Mod: S$GLB,,, | Performed by: NURSE PRACTITIONER

## 2017-08-10 PROCEDURE — 17003 DESTRUCT PREMALG LES 2-14: CPT | Mod: S$GLB,,, | Performed by: DERMATOLOGY

## 2017-08-10 PROCEDURE — 1159F MED LIST DOCD IN RCRD: CPT | Mod: S$GLB,,, | Performed by: DERMATOLOGY

## 2017-08-10 PROCEDURE — 1157F ADVNC CARE PLAN IN RCRD: CPT | Mod: S$GLB,,, | Performed by: DERMATOLOGY

## 2017-08-10 PROCEDURE — 17110 DESTRUCTION B9 LES UP TO 14: CPT | Mod: S$GLB,,, | Performed by: DERMATOLOGY

## 2017-08-10 PROCEDURE — 99999 PR PBB SHADOW E&M-EST. PATIENT-LVL V: CPT | Mod: PBBFAC,,, | Performed by: NURSE PRACTITIONER

## 2017-08-10 PROCEDURE — 1126F AMNT PAIN NOTED NONE PRSNT: CPT | Mod: S$GLB,,, | Performed by: DERMATOLOGY

## 2017-08-10 PROCEDURE — 3008F BODY MASS INDEX DOCD: CPT | Mod: S$GLB,,, | Performed by: DERMATOLOGY

## 2017-08-10 PROCEDURE — 99499 UNLISTED E&M SERVICE: CPT | Mod: S$GLB,,, | Performed by: NURSE PRACTITIONER

## 2017-08-10 PROCEDURE — 1126F AMNT PAIN NOTED NONE PRSNT: CPT | Mod: S$GLB,,, | Performed by: NURSE PRACTITIONER

## 2017-08-10 PROCEDURE — 3078F DIAST BP <80 MM HG: CPT | Mod: S$GLB,,, | Performed by: DERMATOLOGY

## 2017-08-10 PROCEDURE — 3074F SYST BP LT 130 MM HG: CPT | Mod: S$GLB,,, | Performed by: NURSE PRACTITIONER

## 2017-08-10 PROCEDURE — 1159F MED LIST DOCD IN RCRD: CPT | Mod: S$GLB,,, | Performed by: NURSE PRACTITIONER

## 2017-08-10 PROCEDURE — 99999 PR PBB SHADOW E&M-EST. PATIENT-LVL III: CPT | Mod: PBBFAC,,, | Performed by: DERMATOLOGY

## 2017-08-10 PROCEDURE — 3074F SYST BP LT 130 MM HG: CPT | Mod: S$GLB,,, | Performed by: DERMATOLOGY

## 2017-08-10 PROCEDURE — 3078F DIAST BP <80 MM HG: CPT | Mod: S$GLB,,, | Performed by: NURSE PRACTITIONER

## 2017-08-10 PROCEDURE — 17000 DESTRUCT PREMALG LESION: CPT | Mod: 59,S$GLB,, | Performed by: DERMATOLOGY

## 2017-08-10 PROCEDURE — 1157F ADVNC CARE PLAN IN RCRD: CPT | Mod: S$GLB,,, | Performed by: NURSE PRACTITIONER

## 2017-08-10 PROCEDURE — 99213 OFFICE O/P EST LOW 20 MIN: CPT | Mod: 25,S$GLB,, | Performed by: DERMATOLOGY

## 2017-08-10 NOTE — Clinical Note
Austin Gary!  Just touching bases regarding Mr. Montgomery.  We no longer need to see him unless  needed.  I told him you'd contact me if any concerns for infection in the heel.  They speak very highly of your care!  Looks like you did arterial US and SAMANTHA .66?   Probably needs vascular eval?   Call me with any concerns/questions!

## 2017-08-10 NOTE — PROGRESS NOTES
Subjective:      Patient ID: Janusz Montgomery Jr. is a 72 y.o. male.    Chief Complaint:Follow-up      History of Present Illness    Mr. Montgomery is here for follow up.   See my note of 7/5 for full history.   Most recently he is s/p external fixator removal and revision ORIF with allograft of left distal tibia fracture nonunion on 4/13/17 by Dr Cortés.  He had been on long term oral antibiotics, but has been off for one month.   Saw Dr. Cortés on 8/2.  Surgical wound noted to be well healed.      He is also following with Dr. Gary for chronic heel ulceration. She recently ordered arterial US which showed no focal stenosis but abnormal SAMANTHA in the left.    He denies pain, fevers, chills, pain or other systemic symptoms.           Review of Systems   Constitution: Negative for chills, decreased appetite, fever, weakness, malaise/fatigue, night sweats, weight gain and weight loss.   HENT: Negative for congestion, ear pain, headaches, hearing loss, hoarse voice, sore throat and tinnitus.    Eyes: Negative for blurred vision, redness and visual disturbance.   Cardiovascular: Negative for chest pain, leg swelling and palpitations.   Respiratory: Negative for cough, hemoptysis, shortness of breath and sputum production.    Hematologic/Lymphatic: Negative for adenopathy. Does not bruise/bleed easily.   Skin: Negative for dry skin, itching, rash and suspicious lesions.   Musculoskeletal: Negative for back pain, joint pain, myalgias and neck pain.   Gastrointestinal: Positive for nausea. Negative for abdominal pain, constipation, diarrhea, heartburn and vomiting.   Genitourinary: Negative for dysuria, flank pain, frequency, hematuria, hesitancy and urgency.   Neurological: Negative for dizziness, numbness and paresthesias.   Psychiatric/Behavioral: Negative for depression and memory loss. The patient does not have insomnia and is not nervous/anxious.      Objective:   Physical Exam   Constitutional: He is oriented to person,  place, and time. No distress.   Eyes: Conjunctivae are normal. Right eye exhibits no discharge. Left eye exhibits no discharge. No scleral icterus.   Cardiovascular: Normal rate and regular rhythm.    Pulmonary/Chest: Effort normal. No respiratory distress.   Abdominal: Soft. There is no tenderness.   Musculoskeletal: He exhibits edema (bilateral mild ankle edema - baseline).   Surgical incision site healing well.  No surrounding erythema. No tenderness.   Mild edema.       Left foot in football dressing;  not taken down. Dressing is c/d/i.  See photo from Dr. Gary's visit of 8/7   Neurological: He is alert and oriented to person, place, and time.   Skin: Skin is warm and dry. No rash noted. He is not diaphoretic.   Psychiatric: He has a normal mood and affect. His behavior is normal.   Vitals reviewed.    Assessment:       1. 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 Non-pressure chronic ulcer of other part of left foot with unspecified severity         Approx 4 months s/p removal of external fixator and pins and revision of left tibial non-union with bone graft and new plate.  He completed 6 weeks of IV antibiotics followed by 3 months or orals.  Off antibiotics now X 1 month.   No local or systemic signs of infection and per operative note, there was no evidence of bony infection.       He had chronic left heel wound and followed by Podiatry. Recent arterial US showed no focal stenosis, but  ABIs decreased in the left foot - .66 suggesting moderate PAD. In light of slow healing ulcer, needs Vascular evaluation.    Plan:     1.  Follow up with ID as needed.   2.  Follow up with Dr. Cortés and Dr. Gary as scheduled.   3.  I will communicate with Dr. Gary to contact me if any concerns with infection in the heel.   4.  Will also discuss referral for Vascular evaluation in light of arterial US  5.  Call for any concerns.  They have my contact information.

## 2017-08-10 NOTE — PATIENT INSTRUCTIONS

## 2017-08-10 NOTE — PROGRESS NOTES
"  Subjective:       Patient ID:  Janusz Montgomery Jr. is a 72 y.o. male who presents for   Chief Complaint   Patient presents with    Skin Check     UBSE     History of Present Illness: The patient presents for follow up of skin check.    The patient was last seen on: 4/5/16 for cryosurgery to actinic keratoses which have resolved and Bx of right chest- Invasive SCC with negative margins. Started on Efudex- patient did not use medication due to "up coming events and time got away".     This is a high risk patient here to check for the development of new lesions.  Hx of multiple NMSCs    Other skin complaints:   Patient complains of lesion(s)  Location: left ear  Duration: couple months  Symptoms: scaly  Relieving factors/Previous treatments: none    Patient complains of lesion(s)  Location: inner corner of left eye  Duration: year  Symptoms: scaly  Relieving factors/Previous treatments: none    Patient complains of lesion(s)  Location: left upper thigh   Duration: 2 years  Symptoms: rough, changing in color and size  Relieving factors/Previous treatments: none    Not using AM lactin regularly            Review of Systems   Constitutional: Negative for fever and chills.   Skin: Positive for wears hat ( when outdoors for long periods of time). Negative for itching, rash, daily sunscreen use, activity-related sunscreen use ( rarely outdoors) and recent sunburn.   Hematologic/Lymphatic: Bruises/bleeds easily (takes aspirin).        Objective:    Physical Exam   Constitutional: He appears well-developed and well-nourished. He is obese.  No distress.   HENT:   Mouth/Throat: Lips normal.    Eyes: Lids are normal.  No conjunctival no injection.   Cardiovascular: There is dependent edema (legs).     Lymphadenopathy:        Head (right side): No submental, no submandibular, no preauricular, no posterior auricular and no occipital adenopathy present.        Head (left side): No submental, no submandibular, no preauricular, no " posterior auricular and no occipital adenopathy present.     He has no axillary adenopathy.   Neurological: He is alert and oriented to person, place, and time. He is not disoriented.   Psychiatric: He has a normal mood and affect.   Skin:   Areas Examined (abnormalities noted in diagram):   Scalp / Hair Palpated and Inspected  Head / Face Inspection Performed  Neck Inspection Performed  Chest / Axilla Inspection Performed  Back Inspection Performed  RUE Inspected  LUE Inspection Performed  LLE Inspection Performed  Nails and Digits Inspection Performed                   Diagram Legend     Erythematous scaling macule/papule c/w actinic keratosis       Vascular papule c/w angioma      Pigmented verrucoid papule/plaque c/w seborrheic keratosis      Yellow umbilicated papule c/w sebaceous hyperplasia      Irregularly shaped tan macule c/w lentigo     1-2 mm smooth white papules consistent with Milia      Movable subcutaneous cyst with punctum c/w epidermal inclusion cyst      Subcutaneous movable cyst c/w pilar cyst      Firm pink to brown papule c/w dermatofibroma      Pedunculated fleshy papule(s) c/w skin tag(s)      Evenly pigmented macule c/w junctional nevus     Mildly variegated pigmented, slightly irregular-bordered macule c/w mildly atypical nevus      Flesh colored to evenly pigmented papule c/w intradermal nevus       Pink pearly papule/plaque c/w basal cell carcinoma      Erythematous hyperkeratotic cursted plaque c/w SCC      Surgical scar with no sign of skin cancer recurrence      Open and closed comedones      Inflammatory papules and pustules      Verrucoid papule consistent consistent with wart     Erythematous eczematous patches and plaques     Dystrophic onycholytic nail with subungual debris c/w onychomycosis     Umbilicated papule    Erythematous-base heme-crusted tan verrucoid plaque consistent with inflamed seborrheic keratosis     Erythematous Silvery Scaling Plaque c/w Psoriasis     See  annotation      Assessment / Plan:        AK (actinic keratosis)  Cryosurgery Procedure Note    Verbal consent from the patient is obtained and the patient is aware of the precancerous quality and need for treatment of these lesions. Liquid nitrogen cryosurgery is applied to the 5 actinic keratoses, as detailed in the physical exam, to produce a freeze injury. The patient is aware that blisters may form and is instructed on wound care with gentle cleansing and use of vaseline ointment to keep moist until healed. The patient is supplied a handout on cryosurgery and is instructed to call if lesions do not completely resolve.      SK (seborrheic keratosis)  These are benign inherited growths without a malignant potential. Reassurance given to patient. No treatment is necessary.     Chronic solar dermatitis  Encouraged regular use of  Am Lactin lotion or cream to arms and hands nightly. Available over-the counter.    Patient instructed in importance in daily sun protection of at least spf 30. Sun avoidance and topical protection discussed.     Patient encouraged to wear hat for all outdoor exposure.     Also discussed sun protective clothing.      Personal history of other malignant neoplasm of skin  Area(s) of previous NMSC evaluated with no signs of recurrence.    Upper body skin examination performed today including at least 6 points as noted in physical examination. No lesions suspicious for malignancy noted.      Sebaceous gland hyperplasia  This is a common condition representing benign enlargement of the sebaceous lobule. It typically occurs in adulthood. Reassurance given to patient.                Return in about 6 months (around 2/10/2018).

## 2017-08-14 ENCOUNTER — PATIENT MESSAGE (OUTPATIENT)
Dept: PODIATRY | Facility: CLINIC | Age: 72
End: 2017-08-14

## 2017-08-14 DIAGNOSIS — I73.9 PVD (PERIPHERAL VASCULAR DISEASE): ICD-10-CM

## 2017-08-14 DIAGNOSIS — L97.422 CHRONIC HEEL ULCER, LEFT, WITH FAT LAYER EXPOSED: Primary | ICD-10-CM

## 2017-08-21 ENCOUNTER — OFFICE VISIT (OUTPATIENT)
Dept: PODIATRY | Facility: CLINIC | Age: 72
End: 2017-08-21
Payer: MEDICARE

## 2017-08-21 VITALS
HEIGHT: 73 IN | WEIGHT: 315 LBS | DIASTOLIC BLOOD PRESSURE: 52 MMHG | SYSTOLIC BLOOD PRESSURE: 112 MMHG | BODY MASS INDEX: 41.75 KG/M2

## 2017-08-21 DIAGNOSIS — L97.422 CHRONIC HEEL ULCER, LEFT, WITH FAT LAYER EXPOSED: Primary | ICD-10-CM

## 2017-08-21 DIAGNOSIS — I73.9 PVD (PERIPHERAL VASCULAR DISEASE): ICD-10-CM

## 2017-08-21 PROCEDURE — 1126F AMNT PAIN NOTED NONE PRSNT: CPT | Mod: S$GLB,,, | Performed by: PODIATRIST

## 2017-08-21 PROCEDURE — 99499 UNLISTED E&M SERVICE: CPT | Mod: S$GLB,,, | Performed by: PODIATRIST

## 2017-08-21 PROCEDURE — 3074F SYST BP LT 130 MM HG: CPT | Mod: S$GLB,,, | Performed by: PODIATRIST

## 2017-08-21 PROCEDURE — 3008F BODY MASS INDEX DOCD: CPT | Mod: S$GLB,,, | Performed by: PODIATRIST

## 2017-08-21 PROCEDURE — 1157F ADVNC CARE PLAN IN RCRD: CPT | Mod: S$GLB,,, | Performed by: PODIATRIST

## 2017-08-21 PROCEDURE — 3078F DIAST BP <80 MM HG: CPT | Mod: S$GLB,,, | Performed by: PODIATRIST

## 2017-08-21 PROCEDURE — 99999 PR PBB SHADOW E&M-EST. PATIENT-LVL III: CPT | Mod: PBBFAC,,, | Performed by: PODIATRIST

## 2017-08-21 PROCEDURE — 99213 OFFICE O/P EST LOW 20 MIN: CPT | Mod: S$GLB,,, | Performed by: PODIATRIST

## 2017-08-21 PROCEDURE — 1159F MED LIST DOCD IN RCRD: CPT | Mod: S$GLB,,, | Performed by: PODIATRIST

## 2017-08-21 NOTE — PROGRESS NOTES
Subjective:      Patient ID: Janusz Montgomery Jr. is a 72 y.o. male.    Chief Complaint: Wound Care (left heel pcp Dr. Weathers 4/28/17)    Janusz Montgomery Jr. is a 72 y.o. male returns to clinic for follow up of  left foot ulcers.  Patient's wife has been performing wound care with santyl daily.  Patient denies pain. No new pedal complaints.    Shoe gear: moon boot left and tennis shoe right    Chief Complaint   Patient presents with    Wound Care     left heel pcp Dr. Weathers 4/28/17       Hemoglobin A1C   Date Value Ref Range Status   07/11/2016 5.3 4.5 - 6.2 % Final     Comment:     According to ADA guidelines, hemoglobin A1C <7.0% represents  optimal control in non-pregnant diabetic patients.  Different  metrics may apply to specific populations.   Standards of Medical Care in Diabetes - 2016.  For the purpose of screening for the presence of diabetes:  <5.7%     Consistent with the absence of diabetes  5.7-6.4%  Consistent with increasing risk for diabetes   (prediabetes)  >or=6.5%  Consistent with diabetes  Currently no consensus exists for use of hemoglobin A1C  for diagnosis of diabetes for children.     03/31/2015 5.3 4.5 - 6.2 % Final   03/03/2014 5.6 4.5 - 6.2 % Final       Current shoe gear:  worn tennis shoes      Current Outpatient Prescriptions on File Prior to Visit   Medication Sig Dispense Refill    acyclovir (ZOVIRAX) 800 MG Tab Take 200 mg by mouth once daily.      artificial tears (ISOPTO TEARS) 0.5 % ophthalmic solution Place 1 drop into both eyes as needed. (Patient taking differently: Place 1 drop into both eyes as needed (for dry eyes). )      aspirin 325 MG tablet Take 1 tablet (325 mg total) by mouth once daily.  0    atorvastatin (LIPITOR) 40 MG tablet Take 1 tablet (40 mg total) by mouth once daily. 90 tablet 0    cetirizine (ZYRTEC) 10 MG tablet Take 1 tablet (10 mg total) by mouth once daily. (Patient taking differently: Take 10 mg by mouth daily as needed. )  0    collagenase  ointment Apply topically every Mon, Wed, Fri. Nursing to apply Santyl  nickel thick to wound bed and edges and cover with damp gauze (to activate santyl) daily and cover with telfa island dressing (mepore) over each pressure injury. Santyl has autolytic debridement properties and will promote healing. 60 g 0    CYANOCOBALAMIN, VITAMIN B-12, (VITAMIN B-12 ORAL) Take 2,500 mcg by mouth once daily.      dextrose 5 % SolP 250 mL with vancomycin 1,000 mg SolR 1,500 mg Inject 1,500 mg into the vein once daily. 4/26/17=stop date.      diphenhydrAMINE (BENADRYL) 25 mg capsule Take 1 each (25 mg total) by mouth every 6 (six) hours as needed for Itching.  0    furosemide (LASIX) 20 MG tablet Take 1 tablet (20 mg total) by mouth 2 (two) times daily. 10 tablet 0    gabapentin (NEURONTIN) 300 MG capsule Take 1 capsule (300 mg total) by mouth 2 (two) times daily. 90 capsule 11    INV C. DIFFICILE VACCINE/PLACEBO Administer as directed. FOR INVESTIGATIONAL USE ONLY. 1 Syringe 0    INV C. DIFFICILE VACCINE/PLACEBO Administer as directed. FOR INVESTIGATIONAL USE ONLY. 1 Syringe 0    INV C. DIFFICILE VACCINE/PLACEBO Administer as directed. FOR INVESTIGATIONAL USE ONLY. 1 Syringe 0    melatonin 3 mg Tab Take 6 mg by mouth nightly as needed (for sleep).       miconazole (MICOTIN) 2 % cream Apply topically 2 (two) times daily.  0    ramipril (ALTACE) 5 MG capsule TAKE ONE CAPSULE BY MOUTH ONCE DAILY 90 capsule 0    tamsulosin (FLOMAX) 0.4 mg Cp24 Take 1 capsule (0.4 mg total) by mouth once daily. 90 capsule 3    venlafaxine (EFFEXOR) 75 MG tablet Take 2 tablets (150 mg total) by mouth 2 (two) times daily. 360 tablet 0    vitamin D 1000 units Tab Take 1 tablet (1,000 Units total) by mouth once daily.      zinc sulfate (ZINCATE) 220 (50) mg capsule Take 1 capsule (220 mg total) by mouth once daily.       No current facility-administered medications on file prior to visit.        Allergies   Allergen Reactions     Ciprofloxacin Rash     Diffuse pruritic morbilliform rash developed 3/15/2017 after dose of cipro; previously in 2/2017 he had rash/fevers after initiation of cipro    Zosyn [Piperacillin-Tazobactam] Anaphylaxis     Diffuse pruritic morbilliform rash developed 3/15/2017.  Then, 430am dose on 3/16 and rash worsened with SOB/tachypnea but no hypoxemia.     Bacitracin Itching and Rash     Violaceous rash in area of topical Tx.        Past Surgical History:   Procedure Laterality Date    Cardiac stenting x2      CATARACT EXTRACTION W/  INTRAOCULAR LENS IMPLANT Right 3/29/2016    Dr. Conteh    CATARACT EXTRACTION W/  INTRAOCULAR LENS IMPLANT Left 4/12/2016        EXTERNAL FIXATION TIBIAL FRACTURE Left 07/01/2016    ORIF TIBIA FRACTURE Left 07/15/2016    Squamous cell cancer removal x3 with Mohs surgery      TONSILLECTOMY      TOTAL KNEE ARTHROPLASTY  10/2012    trus/bx         Family History   Problem Relation Age of Onset    Skin cancer Father     Lung cancer Father     Cancer Father      smoker,     Alzheimer's disease Mother     Hypertension Mother     Cancer Sister      colon, lung cancer     Cancer Brother      skin cancer, polypectomy     Peripheral vascular disease      Melanoma Neg Hx     Psoriasis Neg Hx     Lupus Neg Hx     Eczema Neg Hx     Amblyopia Neg Hx     Blindness Neg Hx     Cataracts Neg Hx     Diabetes Neg Hx     Glaucoma Neg Hx     Macular degeneration Neg Hx     Retinal detachment Neg Hx     Strabismus Neg Hx     Stroke Neg Hx     Thyroid disease Neg Hx     Acne Neg Hx        Social History     Social History    Marital status:      Spouse name: N/A    Number of children: N/A    Years of education: N/A     Occupational History    Retired       Social History Main Topics    Smoking status: Never Smoker    Smokeless tobacco: Never Used    Alcohol use Yes      Comment: occasionally    Drug use: No    Sexual activity: Yes     Other  "Topics Concern    Not on file     Social History Narrative    No narrative on file           Review of Systems   Constitution: Negative for chills, fever and weakness.   Cardiovascular: Negative for claudication and leg swelling.   Respiratory: Negative for cough and shortness of breath.    Skin: Positive for dry skin. Negative for itching, nail changes and rash.   Musculoskeletal: Positive for arthritis, joint pain and myalgias. Negative for falls, joint swelling and muscle weakness.   Gastrointestinal: Negative for diarrhea, nausea and vomiting.   Neurological: Positive for paresthesias. Negative for numbness and tremors.   Psychiatric/Behavioral: Negative for altered mental status and hallucinations.           Objective:       Vitals:    08/21/17 1320   BP: (!) 112/52   Weight: (!) 153.8 kg (339 lb)   Height: 6' 1" (1.854 m)   PainSc: 0-No pain       Physical Exam   Constitutional:  Non-toxic appearance. He does not have a sickly appearance. No distress.   Pt. is well-developed, well-nourished, appears stated age, in no acute distress, alert and oriented x 3. No evidence of depression, anxiety, or agitation. Calm, cooperative, and communicative. Appropriate interactions and affect.   Cardiovascular:   Pulses:       Dorsalis pedis pulses are 2+ on the right side, and 2+ on the left side.        Posterior tibial pulses are 1+ on the right side, and 1+ on the left side.        Pulmonary/Chest: No respiratory distress.   Musculoskeletal:        Right ankle: He exhibits swelling. No tenderness. No lateral malleolus, no medial malleolus, no AITFL, no CF ligament, no head of 5th metatarsal and no proximal fibula tenderness found. Achilles tendon exhibits no pain, no defect and normal Zabala's test results.        Left ankle: He exhibits swelling. No tenderness. No lateral malleolus, no medial malleolus, no AITFL, no CF ligament and no posterior TFL tenderness found. Achilles tendon exhibits no pain, no defect and " normal Zabala's test results.        Right foot: There is no tenderness and no bony tenderness.        Left foot: There is no tenderness and no bony tenderness.   Decreased stride, station of gait.  apropulsive toe off.  Increased angle and base of gait.    Patient has hammertoes of digits 2-5 bilateral partially reducible without symptom today.     There is equinus deformity bilateral. There is limitation of dorsiflexion with knees extended and with knees flexed.    Shoes reveals lateral heel counter wear bilateral in athletic shoes.        Lymphadenopathy:   No lymphatic streaking    Negative lymphadenopathy bilateral popliteal fossa and tarsal tunnel.     Neurological:   Stanford-Jenn 5.07 monofilament is intact bilateral feet. Sharp/dull sensation is also intact Bilateral feet. Proprioception is grossly intact. Vibratory sensation intact (pt able to sense vibration stop within 3-5 seconds)     Skin: Skin is warm and dry. Lesion noted. No bruising, no burn, no laceration and no rash noted. He is not diaphoretic. There is erythema. No cyanosis. No pallor. Nails show no clubbing.   Ulcer location: posterior left heel  Signs of infection: local edema and erythema  Drainage: sanguinous  Periwound: intact  Base: granular and fibrinous    Dermatitis noted to posterior left heel   Psychiatric: His mood appears not anxious. His affect is not inappropriate. His speech is not slurred. He is not combative. He is communicative. He is attentive.   Nursing note reviewed.                              Assessment:       Encounter Diagnoses   Name Primary?    Chronic heel ulcer, left, with fat layer exposed Yes    PVD (peripheral vascular disease)          Plan:       Janusz was seen today for wound care.    Diagnoses and all orders for this visit:    Chronic heel ulcer, left, with fat layer exposed    PVD (peripheral vascular disease)      I counseled the patient on his conditions, their implications and medical  management.    The wound is cleansed of foreign material as much as possible, and dressed. The patient is alerted to watch for any signs of infection (redness, pus, pain, increased swelling or fever) and call if such occurs.    Referral to Vascular Surgery for evaluation of SAMANTHA results    Debridement: deferred  Dressings: hydrofera blue   Offloading: heel football and moon boot    Home wound care order with santyl to continue    Follow-up:Patient is to return to the clinic in 2 weeks for follow-up but should call [facility] immediately if any signs of infection, such as fever, chills, sweats, increased redness or pain.    Short-term goals include maintaining good offloading and minimizing bioburden, promoting granulation and epithelialization to healing.  Long-term goals include keeping the wound healed by good offloading and medical management under the direction of internist.

## 2017-08-23 DIAGNOSIS — B00.52 HERPES SIMPLEX KERATITIS: ICD-10-CM

## 2017-08-23 RX ORDER — ACYCLOVIR 800 MG/1
TABLET ORAL
Qty: 30 TABLET | Refills: 0 | Status: SHIPPED | OUTPATIENT
Start: 2017-08-23 | End: 2017-09-08 | Stop reason: SDUPTHER

## 2017-08-25 ENCOUNTER — TELEPHONE (OUTPATIENT)
Dept: ORTHOPEDICS | Facility: CLINIC | Age: 72
End: 2017-08-25

## 2017-08-25 NOTE — TELEPHONE ENCOUNTER
Spoke with Radha, pt's daughter.   States pt's wife has to have sx and Radha was looking for SNF or rehab placement for pt while spouse recoups at home   Advise Radha that pt's cannot be admitted into SNF or Rehab from home, only from a hospital setting.  Radha verbalized understanding.

## 2017-08-25 NOTE — TELEPHONE ENCOUNTER
----- Message from Romero Martin sent at 8/24/2017  4:32 PM CDT -----  Contact: daughter Radha Garcia request a call regarding questions about his physical therapy. [phone]

## 2017-08-27 ENCOUNTER — PATIENT MESSAGE (OUTPATIENT)
Dept: FAMILY MEDICINE | Facility: CLINIC | Age: 72
End: 2017-08-27

## 2017-08-28 ENCOUNTER — PATIENT MESSAGE (OUTPATIENT)
Dept: FAMILY MEDICINE | Facility: CLINIC | Age: 72
End: 2017-08-28

## 2017-09-05 ENCOUNTER — OFFICE VISIT (OUTPATIENT)
Dept: ORTHOPEDICS | Facility: CLINIC | Age: 72
End: 2017-09-05
Payer: MEDICARE

## 2017-09-05 ENCOUNTER — RESEARCH ENCOUNTER (OUTPATIENT)
Dept: RESEARCH | Facility: HOSPITAL | Age: 72
End: 2017-09-05

## 2017-09-05 ENCOUNTER — HOSPITAL ENCOUNTER (OUTPATIENT)
Dept: RADIOLOGY | Facility: HOSPITAL | Age: 72
Discharge: HOME | End: 2017-09-05
Attending: ORTHOPAEDIC SURGERY
Payer: MEDICARE

## 2017-09-05 DIAGNOSIS — S82.402F: Primary | ICD-10-CM

## 2017-09-05 DIAGNOSIS — S82.202F: Primary | ICD-10-CM

## 2017-09-05 DIAGNOSIS — Z98.890 STATUS POST SURGERY: ICD-10-CM

## 2017-09-05 PROCEDURE — 1157F ADVNC CARE PLAN IN RCRD: CPT | Mod: S$GLB,,, | Performed by: ORTHOPAEDIC SURGERY

## 2017-09-05 PROCEDURE — 99214 OFFICE O/P EST MOD 30 MIN: CPT | Mod: S$GLB,,, | Performed by: ORTHOPAEDIC SURGERY

## 2017-09-05 PROCEDURE — 1159F MED LIST DOCD IN RCRD: CPT | Mod: S$GLB,,, | Performed by: ORTHOPAEDIC SURGERY

## 2017-09-05 PROCEDURE — 3008F BODY MASS INDEX DOCD: CPT | Mod: S$GLB,,, | Performed by: ORTHOPAEDIC SURGERY

## 2017-09-05 PROCEDURE — 73610 X-RAY EXAM OF ANKLE: CPT | Mod: 26,LT,, | Performed by: RADIOLOGY

## 2017-09-05 PROCEDURE — 99499 UNLISTED E&M SERVICE: CPT | Mod: S$GLB,,, | Performed by: ORTHOPAEDIC SURGERY

## 2017-09-05 PROCEDURE — 73610 X-RAY EXAM OF ANKLE: CPT | Mod: TC,LT

## 2017-09-05 NOTE — PROGRESS NOTES
HPI:  Mr. 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  He is doing well and has no pain.  His heel ulcer is being followed in wound care clinic and healing in nicely.  We kept him on antibiotics for an extended period with the ulcer to avoid having issues with seeding the plate.  He is off of them now.  He remains NWB.  I have kept him NWB for an extended period due to the valgus and breaking of the plate the first time.       ROS:  Patient denies constitutional symptoms, cardiac symptoms, respiratory symptoms, GI symptoms.  The remainder of the musculoskeletal ROS is included in the HPI.    PE:    AA&O x 4.  NAD  HEENT:  NCAT, sclera nonicteric  Lungs:  Respirations are equal and unlabored.  CV:  2+ bilateral upper and lower extremity pulses.    PE:  Incisions well healed.  Some swelling/edema today, but no erythema.  Heel ulcer continues to decrease in size with good base, almost completely healed at this point.       Rads:  Hwr intact.  Good alignment.  Defect filling in and fibula healing as well.  Bone consolidating nicely    A/P:  5 months status post revision fixation with bone grafting of grade III open left distal tibia fracture.  He is filling in nicely.  At this point, he will begin progressive weight bearing.  He will RTC in 6 weeks with x-rays of the left ankle.  Sooner if he has any issues.

## 2017-09-05 NOTE — PROGRESS NOTES
Sanofi C. Diffense Vaccine Trial   Protocol [id number]/ IRB# [id number]  Subject #[id number]      Subject came to clinic on 05Sep2017 for the 4th study visit for the Sanofi Pasteur study and seen by , Mojgan Gold.   (IRB# [id number] PI: Maco Sanofi C. Diff Vaccine [id number]).     Subject confirmed they are still willing to participate in the clinical study and any questions they might have were answered.     The subject's demographics and concomitant medications reviewed for changes (to include new abx, probiotics, anti-diarrheals, or vaccines). No significant changes in health, NO ER Visits or Hospitalizations, No LSE, and No new abx or vaccines.     Diary card #3 was collected and reviewed with subject.      Subject reported No AEs and has had No injection site or systemic reactions after the 3rd vaccine.      Blood was drawn and processed according to protocol.    [employer] calls were discussed and subject was provided with information about the calls.      Subject given site contact information and reminded to call if they experience any diarrhea or hospitalizations.     Subject verbalized understanding of future study obligations and left visit in stable condition.

## 2017-09-06 ENCOUNTER — OFFICE VISIT (OUTPATIENT)
Dept: PODIATRY | Facility: CLINIC | Age: 72
End: 2017-09-06
Payer: MEDICARE

## 2017-09-06 VITALS
BODY MASS INDEX: 41.75 KG/M2 | SYSTOLIC BLOOD PRESSURE: 152 MMHG | HEART RATE: 92 BPM | DIASTOLIC BLOOD PRESSURE: 75 MMHG | HEIGHT: 73 IN | WEIGHT: 315 LBS

## 2017-09-06 DIAGNOSIS — L97.422 CHRONIC HEEL ULCER, LEFT, WITH FAT LAYER EXPOSED: Primary | ICD-10-CM

## 2017-09-06 DIAGNOSIS — I73.9 PVD (PERIPHERAL VASCULAR DISEASE): ICD-10-CM

## 2017-09-06 PROCEDURE — 3077F SYST BP >= 140 MM HG: CPT | Mod: S$GLB,,, | Performed by: PODIATRIST

## 2017-09-06 PROCEDURE — 3008F BODY MASS INDEX DOCD: CPT | Mod: S$GLB,,, | Performed by: PODIATRIST

## 2017-09-06 PROCEDURE — 99214 OFFICE O/P EST MOD 30 MIN: CPT | Mod: S$GLB,,, | Performed by: PODIATRIST

## 2017-09-06 PROCEDURE — 1126F AMNT PAIN NOTED NONE PRSNT: CPT | Mod: S$GLB,,, | Performed by: PODIATRIST

## 2017-09-06 PROCEDURE — 99499 UNLISTED E&M SERVICE: CPT | Mod: S$GLB,,, | Performed by: PODIATRIST

## 2017-09-06 PROCEDURE — 99999 PR PBB SHADOW E&M-EST. PATIENT-LVL III: CPT | Mod: PBBFAC,,, | Performed by: PODIATRIST

## 2017-09-06 PROCEDURE — 3078F DIAST BP <80 MM HG: CPT | Mod: S$GLB,,, | Performed by: PODIATRIST

## 2017-09-06 PROCEDURE — 1159F MED LIST DOCD IN RCRD: CPT | Mod: S$GLB,,, | Performed by: PODIATRIST

## 2017-09-06 PROCEDURE — 1157F ADVNC CARE PLAN IN RCRD: CPT | Mod: S$GLB,,, | Performed by: PODIATRIST

## 2017-09-06 NOTE — PATIENT INSTRUCTIONS
Wound has healed well with no signs of continued skin breakdown noted   Ok to transition into normal shoe gear at this time. Check feet daily for signs of drainage or lesion re-opening   Use of daily foot moisturizer to feet, avoiding the webspace's  Limit showers/bathing to roughly 15 minutes during the 1st few weeks after wound has healed to prevent skin breakdown     Recommend lotions: eucerin, aquaphor, A&D ointment, gold bond for diabetics, sween; urea 40 with aloe (found on amazon.com)    Shoe recommendations: (try 6pm.com, zappos.com , nordstromrack.com, or shoes.com for discounted prices) you can visit DSW shoes in [place] as well    Asics (GT 2000 or gel foundations), new balance, saucony (stabil c3),  Eugene (GTS or Beast or transcend), vionic, propet (tennis shoe)    sofft brand, clarks, crocs, aerosoles, naturalizers, SAS, ecco, eliza, sindy riki doan (dress shoes)    Vionic, burkenstocks, fitflops, propet (sandals)    Nike comfort thong sandals, crocs, propet (house shoes)    Nail Home remedy:  Vicks Vapor rub to nails for easier managability

## 2017-09-06 NOTE — PROGRESS NOTES
Subjective:      Patient ID: Janusz Montgomery Jr. is a 72 y.o. male.    Chief Complaint: Wound Care (pcp Dr. Weathers 4/28/17)    Janusz Montgomery Jr. is a 72 y.o. male returns to clinic for follow up of  left foot ulcers.  Patient's wife has been performing wound care with santyl daily.  Patient denies pain. No new pedal complaints.    Shoe gear: moon boot left and tennis shoe right    Chief Complaint   Patient presents with    Wound Care     pcp Dr. Weathers 4/28/17       Hemoglobin A1C   Date Value Ref Range Status   07/11/2016 5.3 4.5 - 6.2 % Final     Comment:     According to ADA guidelines, hemoglobin A1C <7.0% represents  optimal control in non-pregnant diabetic patients.  Different  metrics may apply to specific populations.   Standards of Medical Care in Diabetes - 2016.  For the purpose of screening for the presence of diabetes:  <5.7%     Consistent with the absence of diabetes  5.7-6.4%  Consistent with increasing risk for diabetes   (prediabetes)  >or=6.5%  Consistent with diabetes  Currently no consensus exists for use of hemoglobin A1C  for diagnosis of diabetes for children.     03/31/2015 5.3 4.5 - 6.2 % Final   03/03/2014 5.6 4.5 - 6.2 % Final       Current shoe gear:  worn tennis shoes      Current Outpatient Prescriptions on File Prior to Visit   Medication Sig Dispense Refill    acyclovir (ZOVIRAX) 800 MG Tab Take 200 mg by mouth once daily.      acyclovir (ZOVIRAX) 800 MG Tab TAKE ONE TABLET BY MOUTH ONCE DAILY 30 tablet 0    artificial tears (ISOPTO TEARS) 0.5 % ophthalmic solution Place 1 drop into both eyes as needed. (Patient taking differently: Place 1 drop into both eyes as needed (for dry eyes). )      aspirin 325 MG tablet Take 1 tablet (325 mg total) by mouth once daily.  0    atorvastatin (LIPITOR) 40 MG tablet Take 1 tablet (40 mg total) by mouth once daily. 90 tablet 0    cetirizine (ZYRTEC) 10 MG tablet Take 1 tablet (10 mg total) by mouth once daily. (Patient taking  differently: Take 10 mg by mouth daily as needed. )  0    collagenase ointment Apply topically every Mon, Wed, Fri. Nursing to apply Santyl  nickel thick to wound bed and edges and cover with damp gauze (to activate santyl) daily and cover with telfa island dressing (mepore) over each pressure injury. Santyl has autolytic debridement properties and will promote healing. 60 g 0    CYANOCOBALAMIN, VITAMIN B-12, (VITAMIN B-12 ORAL) Take 2,500 mcg by mouth once daily.      dextrose 5 % SolP 250 mL with vancomycin 1,000 mg SolR 1,500 mg Inject 1,500 mg into the vein once daily. 4/26/17=stop date.      diphenhydrAMINE (BENADRYL) 25 mg capsule Take 1 each (25 mg total) by mouth every 6 (six) hours as needed for Itching.  0    furosemide (LASIX) 20 MG tablet Take 1 tablet (20 mg total) by mouth 2 (two) times daily. 10 tablet 0    gabapentin (NEURONTIN) 300 MG capsule Take 1 capsule (300 mg total) by mouth 2 (two) times daily. 90 capsule 11    INV C. DIFFICILE VACCINE/PLACEBO Administer as directed. FOR INVESTIGATIONAL USE ONLY. 1 Syringe 0    INV C. DIFFICILE VACCINE/PLACEBO Administer as directed. FOR INVESTIGATIONAL USE ONLY. 1 Syringe 0    INV C. DIFFICILE VACCINE/PLACEBO Administer as directed. FOR INVESTIGATIONAL USE ONLY. 1 Syringe 0    melatonin 3 mg Tab Take 6 mg by mouth nightly as needed (for sleep).       miconazole (MICOTIN) 2 % cream Apply topically 2 (two) times daily.  0    ramipril (ALTACE) 5 MG capsule TAKE ONE CAPSULE BY MOUTH ONCE DAILY 90 capsule 0    tamsulosin (FLOMAX) 0.4 mg Cp24 Take 1 capsule (0.4 mg total) by mouth once daily. 90 capsule 3    venlafaxine (EFFEXOR) 75 MG tablet Take 2 tablets (150 mg total) by mouth 2 (two) times daily. 360 tablet 0    vitamin D 1000 units Tab Take 1 tablet (1,000 Units total) by mouth once daily.      zinc sulfate (ZINCATE) 220 (50) mg capsule Take 1 capsule (220 mg total) by mouth once daily.       No current facility-administered medications on  file prior to visit.        Allergies   Allergen Reactions    Ciprofloxacin Rash     Diffuse pruritic morbilliform rash developed 3/15/2017 after dose of cipro; previously in 2/2017 he had rash/fevers after initiation of cipro    Zosyn [Piperacillin-Tazobactam] Anaphylaxis     Diffuse pruritic morbilliform rash developed 3/15/2017.  Then, 430am dose on 3/16 and rash worsened with SOB/tachypnea but no hypoxemia.     Bacitracin Itching and Rash     Violaceous rash in area of topical Tx.        Past Surgical History:   Procedure Laterality Date    Cardiac stenting x2      CATARACT EXTRACTION W/  INTRAOCULAR LENS IMPLANT Right 3/29/2016    Dr. Conteh    CATARACT EXTRACTION W/  INTRAOCULAR LENS IMPLANT Left 4/12/2016        EXTERNAL FIXATION TIBIAL FRACTURE Left 07/01/2016    ORIF TIBIA FRACTURE Left 07/15/2016    Squamous cell cancer removal x3 with Mohs surgery      TONSILLECTOMY      TOTAL KNEE ARTHROPLASTY  10/2012    trus/bx         Family History   Problem Relation Age of Onset    Skin cancer Father     Lung cancer Father     Cancer Father      smoker,     Alzheimer's disease Mother     Hypertension Mother     Cancer Sister      colon, lung cancer     Cancer Brother      skin cancer, polypectomy     Peripheral vascular disease      Melanoma Neg Hx     Psoriasis Neg Hx     Lupus Neg Hx     Eczema Neg Hx     Amblyopia Neg Hx     Blindness Neg Hx     Cataracts Neg Hx     Diabetes Neg Hx     Glaucoma Neg Hx     Macular degeneration Neg Hx     Retinal detachment Neg Hx     Strabismus Neg Hx     Stroke Neg Hx     Thyroid disease Neg Hx     Acne Neg Hx        Social History     Social History    Marital status:      Spouse name: N/A    Number of children: N/A    Years of education: N/A     Occupational History    Retired       Social History Main Topics    Smoking status: Never Smoker    Smokeless tobacco: Never Used    Alcohol use Yes      Comment:  "occasionally    Drug use: No    Sexual activity: Yes     Other Topics Concern    Not on file     Social History Narrative    No narrative on file           Review of Systems   Constitution: Negative for chills, fever and weakness.   Cardiovascular: Negative for claudication and leg swelling.   Respiratory: Negative for cough and shortness of breath.    Skin: Positive for dry skin. Negative for itching, nail changes and rash.   Musculoskeletal: Positive for arthritis, joint pain and myalgias. Negative for falls, joint swelling and muscle weakness.   Gastrointestinal: Negative for diarrhea, nausea and vomiting.   Neurological: Positive for paresthesias. Negative for numbness and tremors.   Psychiatric/Behavioral: Negative for altered mental status and hallucinations.           Objective:       Vitals:    09/06/17 1445   BP: (!) 152/75   Pulse: 92   Weight: (!) 153.8 kg (339 lb)   Height: 6' 1" (1.854 m)   PainSc: 0-No pain       Physical Exam   Constitutional:  Non-toxic appearance. He does not have a sickly appearance. No distress.   Pt. is well-developed, well-nourished, appears stated age, in no acute distress, alert and oriented x 3. No evidence of depression, anxiety, or agitation. Calm, cooperative, and communicative. Appropriate interactions and affect.   Cardiovascular:   Pulses:       Dorsalis pedis pulses are 2+ on the right side, and 2+ on the left side.        Posterior tibial pulses are 1+ on the right side, and 1+ on the left side.        Pulmonary/Chest: No respiratory distress.   Musculoskeletal:        Right ankle: He exhibits swelling. No tenderness. No lateral malleolus, no medial malleolus, no AITFL, no CF ligament, no head of 5th metatarsal and no proximal fibula tenderness found. Achilles tendon exhibits no pain, no defect and normal Zabala's test results.        Left ankle: He exhibits swelling. No tenderness. No lateral malleolus, no medial malleolus, no AITFL, no CF ligament and no " posterior TFL tenderness found. Achilles tendon exhibits no pain, no defect and normal Zabala's test results.        Right foot: There is no tenderness and no bony tenderness.        Left foot: There is no tenderness and no bony tenderness.   Decreased stride, station of gait.  apropulsive toe off.  Increased angle and base of gait.    Patient has hammertoes of digits 2-5 bilateral partially reducible without symptom today.     There is equinus deformity bilateral. There is limitation of dorsiflexion with knees extended and with knees flexed.    Shoes reveals lateral heel counter wear bilateral in athletic shoes.        Lymphadenopathy:   No lymphatic streaking    Negative lymphadenopathy bilateral popliteal fossa and tarsal tunnel.     Neurological:   [place] 5.07 monofilament is intact bilateral feet. Sharp/dull sensation is also intact Bilateral feet. Proprioception is grossly intact. Vibratory sensation intact (pt able to sense vibration stop within 3-5 seconds)     Skin: Skin is warm and dry. Lesion and rash noted. No bruising, no burn and no laceration noted. He is not diaphoretic. There is erythema. No cyanosis. No pallor. Nails show no clubbing.   Ulcer location: posterior left heel  Signs of infection: local edema and erythema  Drainage:  None  Periwound: intact  Base: thin epithelial     Dermatitis and erythema noted to posterior left heel   Psychiatric: His mood appears not anxious. His affect is not inappropriate. His speech is not slurred. He is not combative. He is communicative. He is attentive.   Nursing note reviewed.                  Assessment:       Encounter Diagnoses   Name Primary?    Chronic heel ulcer, left, with fat layer exposed Yes    PVD (peripheral vascular disease)          Plan:       Janusz was seen today for wound care.    Diagnoses and all orders for this visit:    Chronic heel ulcer, left, with fat layer exposed    PVD (peripheral vascular disease)      I counseled the  patient on his conditions, their implications and medical management.    The wound is cleansed of foreign material as much as possible. The patient is alerted to watch for any signs of infection (redness, pus, pain, increased swelling or fever) and call if such occurs.    Referral to Vascular Surgery for evaluation of SAMANTHA results pending    Wound has healed well with no signs of continued skin breakdown noted   Ok to transition into normal shoe gear at this time. I advised patient to check feet daily for signs of drainage or lesion re-opening   Discussed use of daily foot moisturizer to feet, avoiding the webspace's  Limit showers/bathing to roughly 15 minutes during the 1st few weeks after wound has healed to prevent skin breakdown     Instructed patient on the importance of keeping feet dry. Patient instructed to use absorbent cotton socks and change them if they become sweaty; or wear an open-toe shoe or sandal. Wash the feet at least once a day with soap and water. Apply the antifungal gel as prescribed. Instructed patient that it takes time for symptoms to completely dissipate. Patient instructed to use lysol or over-the-counter antifungal powders or sprays to shoes daily and allow them to air dry, switching shoes from every other day would be optimal. Patient is to avoid barefoot walking in  high-risk environments (public showers, gyms and locker rooms) may prevent future infections.     Patient to RTC if:  Increasing redness or swelling of the foot   Pus draining from cracks in the skin   Fever of 100.4ºF (38ºC) or higher    Follow-up:Patient is to return to the clinic in 3 weeks for follow-up but should call [facility] immediately if any signs of infection, such as fever, chills, sweats, increased redness or pain.    Short-term goals include maintaining good offloading and minimizing bioburden, promoting granulation and epithelialization to healing.  Long-term goals include keeping the wound healed by good  offloading and medical management under the direction of internist.

## 2017-09-08 ENCOUNTER — PATIENT MESSAGE (OUTPATIENT)
Dept: PODIATRY | Facility: CLINIC | Age: 72
End: 2017-09-08

## 2017-09-08 DIAGNOSIS — B00.52 HERPES SIMPLEX KERATITIS: ICD-10-CM

## 2017-09-08 RX ORDER — RAMIPRIL 5 MG/1
5 CAPSULE ORAL DAILY
Qty: 90 CAPSULE | Refills: 0 | Status: SHIPPED | OUTPATIENT
Start: 2017-09-08 | End: 2017-12-11 | Stop reason: SDUPTHER

## 2017-09-08 RX ORDER — ACYCLOVIR 800 MG/1
800 TABLET ORAL DAILY
Qty: 90 TABLET | Refills: 4 | Status: SHIPPED | OUTPATIENT
Start: 2017-09-08 | End: 2018-04-16

## 2017-09-12 ENCOUNTER — CLINICAL SUPPORT (OUTPATIENT)
Dept: ELECTROPHYSIOLOGY | Facility: CLINIC | Age: 72
End: 2017-09-12
Payer: MEDICARE

## 2017-09-12 DIAGNOSIS — I48.0 PAF (PAROXYSMAL ATRIAL FIBRILLATION): ICD-10-CM

## 2017-09-12 DIAGNOSIS — Z95.818 STATUS POST PLACEMENT OF IMPLANTABLE LOOP RECORDER: ICD-10-CM

## 2017-09-15 ENCOUNTER — OFFICE VISIT (OUTPATIENT)
Dept: SLEEP MEDICINE | Facility: CLINIC | Age: 72
End: 2017-09-15
Payer: MEDICARE

## 2017-09-15 VITALS
HEART RATE: 84 BPM | BODY MASS INDEX: 41.75 KG/M2 | HEIGHT: 73 IN | SYSTOLIC BLOOD PRESSURE: 165 MMHG | DIASTOLIC BLOOD PRESSURE: 76 MMHG | WEIGHT: 315 LBS | OXYGEN SATURATION: 96 %

## 2017-09-15 DIAGNOSIS — G47.33 OSA (OBSTRUCTIVE SLEEP APNEA): Primary | ICD-10-CM

## 2017-09-15 DIAGNOSIS — E66.9 OBESITY, UNSPECIFIED OBESITY SEVERITY, UNSPECIFIED OBESITY TYPE: ICD-10-CM

## 2017-09-15 DIAGNOSIS — G47.21 DELAYED SLEEP PHASE SYNDROME: ICD-10-CM

## 2017-09-15 PROCEDURE — 3008F BODY MASS INDEX DOCD: CPT | Mod: S$GLB,,, | Performed by: INTERNAL MEDICINE

## 2017-09-15 PROCEDURE — 99214 OFFICE O/P EST MOD 30 MIN: CPT | Mod: S$GLB,,, | Performed by: INTERNAL MEDICINE

## 2017-09-15 PROCEDURE — 1126F AMNT PAIN NOTED NONE PRSNT: CPT | Mod: S$GLB,,, | Performed by: INTERNAL MEDICINE

## 2017-09-15 PROCEDURE — 1157F ADVNC CARE PLAN IN RCRD: CPT | Mod: S$GLB,,, | Performed by: INTERNAL MEDICINE

## 2017-09-15 PROCEDURE — 3078F DIAST BP <80 MM HG: CPT | Mod: S$GLB,,, | Performed by: INTERNAL MEDICINE

## 2017-09-15 PROCEDURE — 3077F SYST BP >= 140 MM HG: CPT | Mod: S$GLB,,, | Performed by: INTERNAL MEDICINE

## 2017-09-15 PROCEDURE — 1159F MED LIST DOCD IN RCRD: CPT | Mod: S$GLB,,, | Performed by: INTERNAL MEDICINE

## 2017-09-15 PROCEDURE — 99999 PR PBB SHADOW E&M-EST. PATIENT-LVL III: CPT | Mod: PBBFAC,,, | Performed by: INTERNAL MEDICINE
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
## 2017-09-23 ENCOUNTER — PATIENT MESSAGE (OUTPATIENT)
Dept: FAMILY MEDICINE | Facility: CLINIC | Age: 72
End: 2017-09-23

## 2017-09-23 DIAGNOSIS — H91.90 HEARING LOSS, UNSPECIFIED HEARING LOSS TYPE, UNSPECIFIED LATERALITY: Primary | ICD-10-CM

## 2017-09-26 ENCOUNTER — INITIAL CONSULT (OUTPATIENT)
Dept: VASCULAR SURGERY | Facility: CLINIC | Age: 72
End: 2017-09-26
Payer: MEDICARE

## 2017-09-26 VITALS
HEART RATE: 72 BPM | HEIGHT: 73 IN | SYSTOLIC BLOOD PRESSURE: 118 MMHG | DIASTOLIC BLOOD PRESSURE: 70 MMHG | BODY MASS INDEX: 41.75 KG/M2 | WEIGHT: 315 LBS

## 2017-09-26 DIAGNOSIS — S82.402F: ICD-10-CM

## 2017-09-26 DIAGNOSIS — S82.202F: ICD-10-CM

## 2017-09-26 DIAGNOSIS — L97.529 NON-PRESSURE CHRONIC ULCER OF OTHER PART OF LEFT FOOT WITH UNSPECIFIED SEVERITY: Primary | ICD-10-CM

## 2017-09-26 DIAGNOSIS — E66.01 MORBID OBESITY WITH BMI OF 40.0-44.9, ADULT: ICD-10-CM

## 2017-09-26 DIAGNOSIS — I48.0 PAROXYSMAL ATRIAL FIBRILLATION: ICD-10-CM

## 2017-09-26 PROCEDURE — 99204 OFFICE O/P NEW MOD 45 MIN: CPT | Mod: S$GLB,,, | Performed by: SURGERY

## 2017-09-26 PROCEDURE — 1157F ADVNC CARE PLAN IN RCRD: CPT | Mod: S$GLB,,, | Performed by: SURGERY

## 2017-09-26 PROCEDURE — 3078F DIAST BP <80 MM HG: CPT | Mod: S$GLB,,, | Performed by: SURGERY

## 2017-09-26 PROCEDURE — 99999 PR PBB SHADOW E&M-EST. PATIENT-LVL II: CPT | Mod: PBBFAC,,, | Performed by: SURGERY

## 2017-09-26 PROCEDURE — 1126F AMNT PAIN NOTED NONE PRSNT: CPT | Mod: S$GLB,,, | Performed by: SURGERY

## 2017-09-26 PROCEDURE — 3074F SYST BP LT 130 MM HG: CPT | Mod: S$GLB,,, | Performed by: SURGERY

## 2017-09-26 PROCEDURE — 1159F MED LIST DOCD IN RCRD: CPT | Mod: S$GLB,,, | Performed by: SURGERY

## 2017-09-26 PROCEDURE — 99499 UNLISTED E&M SERVICE: CPT | Mod: S$GLB,,, | Performed by: SURGERY

## 2017-09-26 PROCEDURE — 3008F BODY MASS INDEX DOCD: CPT | Mod: S$GLB,,, | Performed by: SURGERY
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
## 2017-09-26 NOTE — LETTER
September 26, 2017      Sandra Gary, DPM  [address] [facility]  [place] [address]           [facility]  [address] [facility] [address]  [place] [address]  Phone: [phone]  Fax: [phone]          Patient: Janusz Montgomery Jr.   MR Number: [id number]   YOB: 1945   Date of Visit: 9/26/2017       Dear Dr. Sandra Gary:    Thank you for referring Janusz Montgomery to me for evaluation. Attached you will find relevant portions of my assessment and plan of care.    If you have questions, please do not hesitate to call me. I look forward to following Janusz Montgomery along with you.    Sincerely,    Anna Zapata MD    Enclosure  CC:  No Recipients    If you would like to receive this communication electronically, please contact [email or web address] or [phone] to request more information on [employer] Link access.    For providers and/or their staff who would like to refer a patient to [facility], please contact us through our one-stop-shop provider referral line, [facility] [place], at [phone].    If you feel you have received this communication in error or would no longer like to receive these types of communications, please e-mail [email or web address]

## 2017-10-03 ENCOUNTER — DOCUMENTATION ONLY (OUTPATIENT)
Dept: ELECTROPHYSIOLOGY | Facility: CLINIC | Age: 72
End: 2017-10-03

## 2017-10-03 ENCOUNTER — TELEPHONE (OUTPATIENT)
Dept: ELECTROPHYSIOLOGY | Facility: CLINIC | Age: 72
End: 2017-10-03

## 2017-10-03 NOTE — TELEPHONE ENCOUNTER
Brought another strip to Dr. Palacio for review, SVT. ARIEL Malcolm RN    ----- Message from Luis Palacio MD sent at 10/3/2017 11:15 AM CDT -----  Regarding: RE: ILR - Event Report  Thanks, is there a better strip for review?  It isn't the best image to analyze.    Thanks    Luis  ----- Message -----  From: Eugenia Malcolm  Sent: 10/3/2017   7:58 AM  To: Luis Palacio MD, Ros Winchester  Subject: ILR - Event Report                               Dr. Palacio,    ILR for long-term AF surveillance.    Patient had TACHY episode 10/2/17 c/w PSVT, 1 min 6 secs.  Able to view strip under Chart Review, Documentation Only.    Thanks!   Eugenia

## 2017-10-11 ENCOUNTER — PATIENT MESSAGE (OUTPATIENT)
Dept: PODIATRY | Facility: CLINIC | Age: 72
End: 2017-10-11

## 2017-10-12 ENCOUNTER — CLINICAL SUPPORT (OUTPATIENT)
Dept: ELECTROPHYSIOLOGY | Facility: CLINIC | Age: 72
End: 2017-10-12
Payer: MEDICARE

## 2017-10-12 DIAGNOSIS — I48.0 PAF (PAROXYSMAL ATRIAL FIBRILLATION): ICD-10-CM

## 2017-10-12 DIAGNOSIS — Z95.818 STATUS POST PLACEMENT OF IMPLANTABLE LOOP RECORDER: ICD-10-CM

## 2017-10-12 PROCEDURE — 93298 REM INTERROG DEV EVAL SCRMS: CPT | Mod: S$GLB,,, | Performed by: INTERNAL MEDICINE

## 2017-10-12 PROCEDURE — 93299 LOOP RECORDER REMOTE: CPT | Mod: S$GLB,,, | Performed by: INTERNAL MEDICINE

## 2017-10-18 ENCOUNTER — OFFICE VISIT (OUTPATIENT)
Dept: ORTHOPEDICS | Facility: CLINIC | Age: 72
End: 2017-10-18
Payer: MEDICARE

## 2017-10-18 ENCOUNTER — HOSPITAL ENCOUNTER (OUTPATIENT)
Dept: RADIOLOGY | Facility: HOSPITAL | Age: 72
Discharge: HOME | End: 2017-10-18
Attending: PHYSICIAN ASSISTANT
Payer: MEDICARE

## 2017-10-18 DIAGNOSIS — Z98.890 STATUS POST SURGERY: ICD-10-CM

## 2017-10-18 DIAGNOSIS — S82.202F: ICD-10-CM

## 2017-10-18 DIAGNOSIS — T14.8XXA FRACTURE: Primary | ICD-10-CM

## 2017-10-18 DIAGNOSIS — S82.402F: ICD-10-CM

## 2017-10-18 DIAGNOSIS — M25.572 ACUTE LEFT ANKLE PAIN: ICD-10-CM

## 2017-10-18 PROCEDURE — 99024 POSTOP FOLLOW-UP VISIT: CPT | Mod: S$GLB,,, | Performed by: PHYSICIAN ASSISTANT

## 2017-10-18 PROCEDURE — 73610 X-RAY EXAM OF ANKLE: CPT | Mod: 26,LT,, | Performed by: RADIOLOGY

## 2017-10-18 PROCEDURE — 73610 X-RAY EXAM OF ANKLE: CPT | Mod: TC,LT

## 2017-10-18 PROCEDURE — 99499 UNLISTED E&M SERVICE: CPT | Mod: S$GLB,,, | Performed by: PHYSICIAN ASSISTANT

## 2017-10-18 PROCEDURE — 99999 PR PBB SHADOW E&M-EST. PATIENT-LVL III: CPT | Mod: PBBFAC,,, | Performed by: PHYSICIAN ASSISTANT

## 2017-10-19 ENCOUNTER — PATIENT MESSAGE (OUTPATIENT)
Dept: ORTHOPEDICS | Facility: CLINIC | Age: 72
End: 2017-10-19

## 2017-10-20 NOTE — PROGRESS NOTES
HPI:  Mr. 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  He is doing well and has no pain.  His heel ulcer is being followed in wound care clinic and continues heal.  He was on antibiotics for an extended period with the ulcer to avoid having issues with seeding the plate.  He is off of them now and has not had any fever chill increased warmth.  He was advanced to progressive weightbearing at previous visit. Patient stated that he has been at 50 percent weightbearing and is doing ok with this. He reports swelling and a dark red discoloration with his foot in the dependent position. He does have issues with vascular in his left leg.       ROS:  Patient denies constitutional symptoms, cardiac symptoms, respiratory symptoms, GI symptoms.  The remainder of the musculoskeletal ROS is included in the HPI.    PE:    AA&O x 4.  NAD  HEENT:  NCAT, sclera nonicteric  Lungs:  Respirations are equal and unlabored.  CV:  2+ bilateral upper and lower extremity pulses.    PE:  Incisions well healed. Mild swelling/edema today, but no erythema.  Heel ulcer continues to decrease in size per patient, new scabbing just formed over ulcer area       Rads:  Left ankle AP, lateral, and oblique views. Comparison 8/1/17. Healing, comminuted fractures of the distal shafts of the fibula and tibia remain in satisfactory position and alignment. Tibial fracture is internally fixated. Screw holes are noted in other areas. The mid shaft of the fibula has an old fracture. Ankle joint space is mildly narrowed.    A/P:   status post revision fixation with bone grafting of grade III open left distal tibia fracture.   At this point, he will continue progressive weight bearing. Order placed for PTMathew, Patient is to make appointment himself.  He will RTC in 8 weeks with x-rays of the left ankle.  Sooner if he has any issues.

## 2017-10-23 RX ORDER — ATORVASTATIN CALCIUM 40 MG/1
40 TABLET, FILM COATED ORAL DAILY
Qty: 90 TABLET | Refills: 0 | Status: SHIPPED | OUTPATIENT
Start: 2017-10-23 | End: 2018-01-23 | Stop reason: SDUPTHER

## 2017-10-25 ENCOUNTER — OFFICE VISIT (OUTPATIENT)
Dept: PODIATRY | Facility: CLINIC | Age: 72
End: 2017-10-25
Payer: MEDICARE

## 2017-10-25 VITALS — SYSTOLIC BLOOD PRESSURE: 130 MMHG | DIASTOLIC BLOOD PRESSURE: 60 MMHG

## 2017-10-25 DIAGNOSIS — Z87.2 HEALED ULCER OF LEFT FOOT ON EXAMINATION: Primary | ICD-10-CM

## 2017-10-25 DIAGNOSIS — I73.9 PVD (PERIPHERAL VASCULAR DISEASE): ICD-10-CM

## 2017-10-25 PROCEDURE — 99499 UNLISTED E&M SERVICE: CPT | Mod: S$GLB,,, | Performed by: PODIATRIST

## 2017-10-25 PROCEDURE — 99999 PR PBB SHADOW E&M-EST. PATIENT-LVL III: CPT | Mod: PBBFAC,,, | Performed by: PODIATRIST

## 2017-10-25 PROCEDURE — 99213 OFFICE O/P EST LOW 20 MIN: CPT | Mod: S$GLB,,, | Performed by: PODIATRIST

## 2017-10-25 NOTE — PATIENT INSTRUCTIONS
Recommend lotions: eucerin, aquaphor, A&D ointment, gold bond for diabetics, sween; urea 40 with aloe (found on amazon.com)    Shoe recommendations: (try 6pm.com, zappos.com , [email or web address].[email or web address], or shoes.com for discounted prices) you can visit DSW shoes in [place] as well    Asics (GT 2000 or gel foundations), new balance, saucony (stabil c3),  Knight (GTS or Beast or transcend), vionic, propet (tennis shoe)    sofmichelle brand, clarks, crocs, aerosoles, naturalizers, SAS, ecco, eliza, sindy doan, rockports (dress shoes)    Vionic, burkenstocks, fitflops, propet (sandals)    Nike comfort thong sandals, crocs, propet (house shoes)    Nail Home remedy:  Vicks Vapor rub to nails for easier managability    Occasional soaks for 15-20 mins in luke warm water with 1 cup of listerine and 1 cup of apple cider vinegar are ok You may add several drops of oil of oregano or tea tree oil as well

## 2017-10-25 NOTE — PROGRESS NOTES
Subjective:      Patient ID: Janusz Montgomery Jr. is a 72 y.o. male.    Chief Complaint: Foot Problem (left heel pcp Dr. Weathers )    Janusz Montgomery Jr. is a 72 y.o. male returns to clinic for follow up of  left foot ulcers.  Patient's wife has been inspecting foot daily.  He relates callus formation where wound was present and concern on if ulceration may be present beneath callus.  Patient mostly in wheelchair with crocs and uses feet to move while in chair.  Patient has not been moisturizing skin daily.  Patient denies pain. No new pedal complaints.    Shoe gear: crocs left and tennis shoe right    Chief Complaint   Patient presents with    Foot Problem     left heel pcp Dr. Weathers        Hemoglobin A1C   Date Value Ref Range Status   07/11/2016 5.3 4.5 - 6.2 % Final     Comment:     According to ADA guidelines, hemoglobin A1C <7.0% represents  optimal control in non-pregnant diabetic patients.  Different  metrics may apply to specific populations.   Standards of Medical Care in Diabetes - 2016.  For the purpose of screening for the presence of diabetes:  <5.7%     Consistent with the absence of diabetes  5.7-6.4%  Consistent with increasing risk for diabetes   (prediabetes)  >or=6.5%  Consistent with diabetes  Currently no consensus exists for use of hemoglobin A1C  for diagnosis of diabetes for children.     03/31/2015 5.3 4.5 - 6.2 % Final   03/03/2014 5.6 4.5 - 6.2 % Final         Current Outpatient Prescriptions on File Prior to Visit   Medication Sig Dispense Refill    acyclovir (ZOVIRAX) 800 MG Tab Take 1 tablet (800 mg total) by mouth once daily. 90 tablet 4    artificial tears (ISOPTO TEARS) 0.5 % ophthalmic solution Place 1 drop into both eyes as needed. (Patient taking differently: Place 1 drop into both eyes as needed (for dry eyes). )      aspirin 325 MG tablet Take 1 tablet (325 mg total) by mouth once daily.  0    atorvastatin (LIPITOR) 40 MG tablet Take 1 tablet (40 mg total) by mouth once  daily. 90 tablet 0    cetirizine (ZYRTEC) 10 MG tablet Take 1 tablet (10 mg total) by mouth once daily. (Patient taking differently: Take 10 mg by mouth daily as needed. )  0    collagenase ointment Apply topically every Mon, Wed, Fri. Nursing to apply Santyl  nickel thick to wound bed and edges and cover with damp gauze (to activate santyl) daily and cover with telfa island dressing (mepore) over each pressure injury. Santyl has autolytic debridement properties and will promote healing. 60 g 0    CYANOCOBALAMIN, VITAMIN B-12, (VITAMIN B-12 ORAL) Take 2,500 mcg by mouth once daily.      dextrose 5 % SolP 250 mL with vancomycin 1,000 mg SolR 1,500 mg Inject 1,500 mg into the vein once daily. 4/26/17=stop date.      diphenhydrAMINE (BENADRYL) 25 mg capsule Take 1 each (25 mg total) by mouth every 6 (six) hours as needed for Itching.  0    furosemide (LASIX) 20 MG tablet Take 1 tablet (20 mg total) by mouth 2 (two) times daily. 10 tablet 0    gabapentin (NEURONTIN) 300 MG capsule Take 1 capsule (300 mg total) by mouth 2 (two) times daily. 90 capsule 11    INV C. DIFFICILE VACCINE/PLACEBO Administer as directed. FOR INVESTIGATIONAL USE ONLY. 1 Syringe 0    INV C. DIFFICILE VACCINE/PLACEBO Administer as directed. FOR INVESTIGATIONAL USE ONLY. 1 Syringe 0    INV C. DIFFICILE VACCINE/PLACEBO Administer as directed. FOR INVESTIGATIONAL USE ONLY. 1 Syringe 0    melatonin 3 mg Tab Take 6 mg by mouth nightly as needed (for sleep).       miconazole (MICOTIN) 2 % cream Apply topically 2 (two) times daily.  0    ramipril (ALTACE) 5 MG capsule Take 1 capsule (5 mg total) by mouth once daily. 90 capsule 0    tamsulosin (FLOMAX) 0.4 mg Cp24 Take 1 capsule (0.4 mg total) by mouth once daily. 90 capsule 3    venlafaxine (EFFEXOR) 75 MG tablet Take 2 tablets (150 mg total) by mouth 2 (two) times daily. 360 tablet 0    vitamin D 1000 units Tab Take 1 tablet (1,000 Units total) by mouth once daily.      zinc sulfate  (ZINCATE) 220 (50) mg capsule Take 1 capsule (220 mg total) by mouth once daily.       No current facility-administered medications on file prior to visit.        Allergies   Allergen Reactions    Ciprofloxacin Rash     Diffuse pruritic morbilliform rash developed 3/15/2017 after dose of cipro; previously in 2/2017 he had rash/fevers after initiation of cipro    Zosyn [Piperacillin-Tazobactam] Anaphylaxis     Diffuse pruritic morbilliform rash developed 3/15/2017.  Then, 430am dose on 3/16 and rash worsened with SOB/tachypnea but no hypoxemia.     Bacitracin Itching and Rash     Violaceous rash in area of topical Tx.        Past Surgical History:   Procedure Laterality Date    Cardiac stenting x2      CATARACT EXTRACTION W/  INTRAOCULAR LENS IMPLANT Right 3/29/2016    Dr. Conteh    CATARACT EXTRACTION W/  INTRAOCULAR LENS IMPLANT Left 4/12/2016        EXTERNAL FIXATION TIBIAL FRACTURE Left 07/01/2016    ORIF TIBIA FRACTURE Left 07/15/2016    Squamous cell cancer removal x3 with Mohs surgery      TONSILLECTOMY      TOTAL KNEE ARTHROPLASTY  10/2012    trus/bx         Family History   Problem Relation Age of Onset    Skin cancer Father     Lung cancer Father     Cancer Father      smoker,     Alzheimer's disease Mother     Hypertension Mother     Cancer Sister      colon, lung cancer     Cancer Brother      skin cancer, polypectomy     Peripheral vascular disease      Melanoma Neg Hx     Psoriasis Neg Hx     Lupus Neg Hx     Eczema Neg Hx     Amblyopia Neg Hx     Blindness Neg Hx     Cataracts Neg Hx     Diabetes Neg Hx     Glaucoma Neg Hx     Macular degeneration Neg Hx     Retinal detachment Neg Hx     Strabismus Neg Hx     Stroke Neg Hx     Thyroid disease Neg Hx     Acne Neg Hx        Social History     Social History    Marital status:      Spouse name: N/A    Number of children: N/A    Years of education: N/A     Occupational History    Retired        Social History Main Topics    Smoking status: Never Smoker    Smokeless tobacco: Never Used    Alcohol use Yes      Comment: occasionally    Drug use: No    Sexual activity: Yes     Other Topics Concern    Not on file     Social History Narrative    No narrative on file           Review of Systems   Constitution: Negative for chills, fever and weakness.   Cardiovascular: Negative for claudication and leg swelling.   Respiratory: Negative for cough and shortness of breath.    Skin: Positive for dry skin. Negative for itching, nail changes and rash.   Musculoskeletal: Positive for arthritis, joint pain and myalgias. Negative for falls, joint swelling and muscle weakness.   Gastrointestinal: Negative for diarrhea, nausea and vomiting.   Neurological: Positive for paresthesias. Negative for numbness and tremors.   Psychiatric/Behavioral: Negative for altered mental status and hallucinations.           Objective:       Vitals:    10/25/17 1332   BP: 130/60       Physical Exam   Constitutional:  Non-toxic appearance. He does not have a sickly appearance. No distress.   Pt. is well-developed, well-nourished, appears stated age, in no acute distress, alert and oriented x 3. No evidence of depression, anxiety, or agitation. Calm, cooperative, and communicative. Appropriate interactions and affect.   Cardiovascular:   Pulses:       Dorsalis pedis pulses are 2+ on the right side, and 2+ on the left side.        Posterior tibial pulses are 1+ on the right side, and 1+ on the left side.        Pulmonary/Chest: No respiratory distress.   Musculoskeletal:        Right ankle: He exhibits swelling. No tenderness. No lateral malleolus, no medial malleolus, no AITFL, no CF ligament, no head of 5th metatarsal and no proximal fibula tenderness found. Achilles tendon exhibits no pain, no defect and normal Zabala's test results.        Left ankle: He exhibits swelling. No tenderness. No lateral malleolus, no medial malleolus,  no AITFL, no CF ligament and no posterior TFL tenderness found. Achilles tendon exhibits no pain, no defect and normal Zabala's test results.        Right foot: There is no tenderness and no bony tenderness.        Left foot: There is no tenderness and no bony tenderness.   Decreased stride, station of gait.  apropulsive toe off.  Increased angle and base of gait.    Patient has hammertoes of digits 2-5 bilateral partially reducible without symptom today.     There is equinus deformity bilateral. There is limitation of dorsiflexion with knees extended and with knees flexed.    Shoes reveals lateral heel counter wear bilateral in athletic shoes.        Lymphadenopathy:   No lymphatic streaking    Negative lymphadenopathy bilateral popliteal fossa and tarsal tunnel.     Neurological:   [place]-Jenn 5.07 monofilament is intact bilateral feet. Sharp/dull sensation is also intact Bilateral feet. Proprioception is grossly intact. Vibratory sensation intact (pt able to sense vibration stop within 3-5 seconds)     Skin: Skin is warm and dry. Lesion and rash noted. No bruising, no burn and no laceration noted. He is not diaphoretic. There is erythema. No cyanosis. No pallor. Nails show no clubbing.   Ulcer location: posterior left heel  Signs of infection: local edema and erythema  Drainage:  None  Periwound: intact  Base: thin epithelial     Dermatitis and erythema noted to posterior left heel   Psychiatric: His mood appears not anxious. His affect is not inappropriate. His speech is not slurred. He is not combative. He is communicative. He is attentive.   Nursing note reviewed.                          Assessment:       Encounter Diagnoses   Name Primary?    PVD (peripheral vascular disease)     Healed ulcer of left foot on examination Yes         Plan:       Janusz was seen today for foot problem.    Diagnoses and all orders for this visit:    Healed ulcer of left foot on examination    PVD (peripheral vascular  disease)      I counseled the patient on his conditions, their implications and medical management.    The wound is cleansed of foreign material as much as possible. The patient is alerted to watch for any signs of infection (redness, pus, pain, increased swelling or fever) and call if such occurs.    Wound has remained healed but with significant callus formation.  Patient advised to wear CAM boot while in chair  I advised patient to check feet daily for signs of drainage or lesion re-opening   Discussed use of daily foot moisturizer to feet, avoiding the webspace's    Instructed patient on the importance of keeping feet dry. Patient instructed to use absorbent cotton socks and change them if they become sweaty; or wear an open-toe shoe or sandal. Wash the feet at least once a day with soap and water. Apply the antifungal gel as prescribed. Instructed patient that it takes time for symptoms to completely dissipate. Patient instructed to use lysol or over-the-counter antifungal powders or sprays to shoes daily and allow them to air dry, switching shoes from every other day would be optimal. Patient is to avoid barefoot walking in  high-risk environments (public showers, gyms and locker rooms) may prevent future infections.     Patient to RTC if:  Increasing redness or swelling of the foot   Pus draining from cracks in the skin   Fever of 100.4ºF (38ºC) or higher    Follow-up:Patient is to return to the clinic in 3-5 weeks for follow-up but should call [facility] immediately if any signs of infection, such as fever, chills, sweats, increased redness or pain.    Short-term goals include maintaining good offloading and minimizing bioburden, promoting granulation and epithelialization to healing.  Long-term goals include keeping the wound healed by good offloading and medical management under the direction of internist.

## 2017-10-28 ENCOUNTER — PATIENT MESSAGE (OUTPATIENT)
Dept: FAMILY MEDICINE | Facility: CLINIC | Age: 72
End: 2017-10-28

## 2017-10-31 ENCOUNTER — CLINICAL SUPPORT (OUTPATIENT)
Dept: AUDIOLOGY | Facility: CLINIC | Age: 72
End: 2017-10-31
Payer: MEDICARE

## 2017-10-31 ENCOUNTER — OFFICE VISIT (OUTPATIENT)
Dept: OTOLARYNGOLOGY | Facility: CLINIC | Age: 72
End: 2017-10-31
Payer: MEDICARE

## 2017-10-31 VITALS — WEIGHT: 315 LBS | BODY MASS INDEX: 45.37 KG/M2 | DIASTOLIC BLOOD PRESSURE: 63 MMHG | SYSTOLIC BLOOD PRESSURE: 119 MMHG

## 2017-10-31 DIAGNOSIS — H69.93 DYSFUNCTION OF BOTH EUSTACHIAN TUBES: ICD-10-CM

## 2017-10-31 DIAGNOSIS — H90.3 SENSORINEURAL HEARING LOSS (SNHL) OF BOTH EARS: Primary | ICD-10-CM

## 2017-10-31 DIAGNOSIS — H90.3 SENSORINEURAL HEARING LOSS, BILATERAL: Primary | ICD-10-CM

## 2017-10-31 PROCEDURE — 92504 EAR MICROSCOPY EXAMINATION: CPT | Mod: S$GLB,,, | Performed by: NURSE PRACTITIONER

## 2017-10-31 PROCEDURE — 99213 OFFICE O/P EST LOW 20 MIN: CPT | Mod: 25,S$GLB,, | Performed by: NURSE PRACTITIONER

## 2017-10-31 PROCEDURE — 92557 COMPREHENSIVE HEARING TEST: CPT | Mod: S$GLB,,, | Performed by: AUDIOLOGIST

## 2017-10-31 PROCEDURE — 99999 PR PBB SHADOW E&M-EST. PATIENT-LVL IV: CPT | Mod: PBBFAC,,, | Performed by: NURSE PRACTITIONER

## 2017-10-31 PROCEDURE — 92567 TYMPANOMETRY: CPT | Mod: S$GLB,,, | Performed by: AUDIOLOGIST

## 2017-10-31 PROCEDURE — 99999 PR PBB SHADOW E&M-EST. PATIENT-LVL I: CPT | Mod: PBBFAC,,,

## 2017-10-31 NOTE — LETTER
November 2, 2017      Miguelina Weathers MD  [address]  [place] [address]           [facility] - Otorhinolaryngology  [address] [facility] [address]  Phone: [phone]  Fax: [phone]          Patient: Janusz Montgomery Jr.   MR Number: [id number]   YOB: 1945   Date of Visit: 10/31/2017       Dear Dr. Miguelina Weathers:    Thank you for referring Janusz Montgomery to me for evaluation. Attached you will find relevant portions of my assessment and plan of care.    If you have questions, please do not hesitate to call me. I look forward to following Janusz Montgomery along with you.    Sincerely,    Diomedes Kraus, SHAKEEL    Enclosure  CC:  No Recipients    If you would like to receive this communication electronically, please contact [email or web address] or [phone] to request more information on [employer] access.    For providers and/or their staff who would like to refer a patient to [facility], please contact us through our one-stop-shop provider referral line, [facility], at [phone].    If you feel you have received this communication in error or would no longer like to receive these types of communications, please e-mail [email or web address]

## 2017-10-31 NOTE — PROGRESS NOTES
"Subjective:       Patient ID: Janusz Montgomery Jr. is a 72 y.o. male.    Chief Complaint: Hearing Loss (dr alexander , candis, dr. mcdonnell)      Mr. Janusz Montgomery is a 73 y/o CM who presents with a several year h/o worsening hearing loss R worse than L. He denies otalgia, otorrhea, tinnitus, dizziness, or vertigo. He endorses loud noise exposure. He is accompanied by his wife and daughter for today's visit. He was seen by Dr.Timothy Alexander on 06/08/2015 for B age-related hearing loss and recommendations for a HAC.    He admits to cleaning his ears with q-tips.  Hearing Loss:   Chronicity:  Chronic  Onset:  More than 1 year ago  Progression since onset:  Unchanged  Frequency:  Constantly  Pain scale:  0/10  Hearing loss characteristics:  Moderate, trouble hearing TV, difficult on telephone, worse background noise and impaired select discriminationNo dizziness, no vertigo, no ear congestion, no ear pain, no fever, no headaches, no tinnitus, no buzzing, no rhinorrhea, no aural fullness, no fluctuance, no imbalance, no otalgia, no otorrhea, no facial weakness, no visual disturbances and not masked by noise.  Aggravated by:  Nothing  Risk factors for lung disease:  Noise exposure and family history of deafness  Treatments tried:  Hearing aids  Improvement on treatment:  Mild (He c/o a lot of background noise with the hearing aids and reports it "drove me crazy.")   PMH includes: noise exposure.  No stroke, TIA, or systemic emboli, no neurologic disease, no dizziness, no ear surgery, no environmental allergies, no ear tubes, no ototoxic drugs, no ear infections and no recent URI.       Past Medical History: Patient has a past medical history of NAT (acute kidney injury) (3/19/2017); ALLERGIC RHINITIS; Anemia; Anxiety; Basal cell carcinoma; Basal cell carcinoma; Basal cell carcinoma (08/18/2014); Basal cell carcinoma (9/13/13); Basal cell carcinoma; Chronic rhinitis (5/3/2013); Chronic rhinitis (5/3/2013); Coronary artery " disease involving native coronary artery of native heart without angina pectoris s/p RCA stent; Cortical cataract of both eyes (3/18/2016); Depression; Erectile dysfunction (3/24/2014); Erectile dysfunction (3/24/2014); Essential hypertension; GERD (gastroesophageal reflux disease) (7/25/2012); Gout, arthritis; Grade III open fracture of left tibia and fibula s/p ex-fix on 7/1/16 and ORIF of left tibia on 7/15 (7/6/2016); H/O: iritis; Helicobacter pylori (H. 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    Past Surgical History: Patient has a past surgical history that includes Cardiac stenting x2; Squamous cell cancer removal x3 with Mohs surgery; Tonsillectomy; trus/bx; Cataract extraction w/  intraocular lens implant (Right, 3/29/2016); Cataract extraction w/  intraocular lens implant (Left, 4/12/2016); ORIF tibia fracture (Left, 07/15/2016); External fixation tibial fracture (Left, 07/01/2016); and Total knee arthroplasty (10/2012).    Social History: Patient reports that he has never smoked. He has never used smokeless tobacco. He reports that he drinks alcohol. He reports that he does not use drugs.    Family History: family history includes Alzheimer's disease in his mother;  Cancer in his brother, father, and sister; Hypertension in his mother; Lung cancer in his father; Skin cancer in his father.    Medications:   Current Outpatient Prescriptions   Medication Sig    acyclovir (ZOVIRAX) 800 MG Tab Take 1 tablet (800 mg total) by mouth once daily.    artificial tears (ISOPTO TEARS) 0.5 % ophthalmic solution Place 1 drop into both eyes as needed. (Patient taking differently: Place 1 drop into both eyes as needed (for dry eyes). )    aspirin 325 MG tablet Take 1 tablet (325 mg total) by mouth once daily.    atorvastatin (LIPITOR) 40 MG tablet Take 1 tablet (40 mg total) by mouth once daily.    cetirizine (ZYRTEC) 10 MG tablet Take 1 tablet (10 mg total) by mouth once daily. (Patient taking differently: Take 10 mg by mouth daily as needed. )    collagenase ointment Apply topically every Mon, Wed, Fri. Nursing to apply Santyl  nickel thick to wound bed and edges and cover with damp gauze (to activate santyl) daily and cover with telfa island dressing (mepore) over each pressure injury. Santyl has autolytic debridement properties and will promote healing.    CYANOCOBALAMIN, VITAMIN B-12, (VITAMIN B-12 ORAL) Take 2,500 mcg by mouth once daily.    dextrose 5 % SolP 250 mL with vancomycin 1,000 mg SolR 1,500 mg Inject 1,500 mg into the vein once daily. 4/26/17=stop date.    diphenhydrAMINE (BENADRYL) 25 mg capsule Take 1 each (25 mg total) by mouth every 6 (six) hours as needed for Itching.    furosemide (LASIX) 20 MG tablet Take 1 tablet (20 mg total) by mouth 2 (two) times daily.    gabapentin (NEURONTIN) 300 MG capsule Take 1 capsule (300 mg total) by mouth 2 (two) times daily.    INV C. DIFFICILE VACCINE/PLACEBO Administer as directed. FOR INVESTIGATIONAL USE ONLY.    INV C. DIFFICILE VACCINE/PLACEBO Administer as directed. FOR INVESTIGATIONAL USE ONLY.    INV C. DIFFICILE VACCINE/PLACEBO Administer as directed. FOR INVESTIGATIONAL USE ONLY.    melatonin 3 mg Tab Take 6 mg by  mouth nightly as needed (for sleep).     miconazole (MICOTIN) 2 % cream Apply topically 2 (two) times daily.    ramipril (ALTACE) 5 MG capsule Take 1 capsule (5 mg total) by mouth once daily.    tamsulosin (FLOMAX) 0.4 mg Cp24 Take 1 capsule (0.4 mg total) by mouth once daily.    venlafaxine (EFFEXOR) 75 MG tablet Take 2 tablets (150 mg total) by mouth 2 (two) times daily.    vitamin D 1000 units Tab Take 1 tablet (1,000 Units total) by mouth once daily.    zinc sulfate (ZINCATE) 220 (50) mg capsule Take 1 capsule (220 mg total) by mouth once daily.     No current facility-administered medications for this visit.        Allergies: Patient is allergic to ciprofloxacin; zosyn [piperacillin-tazobactam]; and bacitracin.    Review of Systems   Constitutional: Negative for activity change, appetite change, chills, diaphoresis, fatigue, fever and unexpected weight change.   HENT: Positive for hearing loss. Negative for congestion, dental problem, drooling, ear discharge, ear pain, facial swelling, mouth sores, nosebleeds, postnasal drip, rhinorrhea, sinus pain, sinus pressure, sneezing, sore throat, tinnitus, trouble swallowing and voice change.    Eyes: Negative for pain and visual disturbance.   Respiratory: Negative for cough, choking, chest tightness, shortness of breath, wheezing and stridor.    Cardiovascular: Negative for chest pain.   Gastrointestinal: Negative for nausea and vomiting.   Musculoskeletal: Negative for gait problem, neck pain and neck stiffness.   Skin: Negative for color change, rash and wound.   Allergic/Immunologic: Negative for environmental allergies.   Neurological: Negative for dizziness, vertigo, seizures, syncope, facial asymmetry, speech difficulty, weakness, light-headedness, numbness and headaches.   Psychiatric/Behavioral: Negative for agitation, confusion and decreased concentration. The patient is not nervous/anxious.        Objective:       /63 (BP Location: Right arm,  Patient Position: Sitting, BP Method: X-Large (Automatic))   Wt (!) 156 kg (343 lb 14.7 oz) Comment: wheel chair bound  BMI 45.37 kg/m²     Physical Exam   Constitutional: He is oriented to person, place, and time. He appears well-developed and well-nourished.   HENT:   Head: Normocephalic and atraumatic. Not macrocephalic and not microcephalic. Head is without raccoon's eyes, without Albright's sign, without abrasion, without contusion, without laceration, without right periorbital erythema and without left periorbital erythema. Hair is normal.   Right Ear: Tympanic membrane, external ear and ear canal normal. No lacerations. No drainage, swelling or tenderness. No foreign bodies. No mastoid tenderness. Tympanic membrane is not injected, not scarred, not perforated, not erythematous, not retracted and not bulging. Tympanic membrane mobility is normal. No middle ear effusion. No hemotympanum. No decreased hearing is noted.   Left Ear: Tympanic membrane, external ear and ear canal normal. No lacerations. No drainage, swelling or tenderness. No foreign bodies. No mastoid tenderness. Tympanic membrane is not injected, not scarred, not perforated, not erythematous, not retracted and not bulging. Tympanic membrane mobility is normal.  No middle ear effusion. No hemotympanum. No decreased hearing is noted.   Nose: Nose normal. No mucosal edema, rhinorrhea, nose lacerations, sinus tenderness, nasal deformity or nasal septal hematoma. No epistaxis.  No foreign bodies.   Mouth/Throat: Uvula is midline, oropharynx is clear and moist and mucous membranes are normal. Abnormal dentition.   Eyes: Conjunctivae, EOM and lids are normal. Pupils are equal, round, and reactive to light.   Neck: Trachea normal and normal range of motion. Neck supple. No spinous process tenderness and no muscular tenderness present. No neck rigidity. No edema, no erythema and normal range of motion present. No thyroid mass and no thyromegaly present.    Pulmonary/Chest: Effort normal.   Abdominal: Soft.   Musculoskeletal: Normal range of motion.   L extremity boot in place.   Lymphadenopathy:        Head (right side): No submental, no submandibular, no tonsillar, no preauricular and no posterior auricular adenopathy present.        Head (left side): No submental, no submandibular, no tonsillar, no preauricular, no posterior auricular and no occipital adenopathy present.     He has no cervical adenopathy.   Neurological: He is alert and oriented to person, place, and time. No cranial nerve deficit or sensory deficit.   Skin: Skin is warm and dry.   Psychiatric: He has a normal mood and affect. His behavior is normal. Judgment and thought content normal.   Nursing note and vitals reviewed.      As a result of this patients history and examination findings, a comprehensive audiogram was ordered to determine the level of hearing/hearing loss.            Assessment:       1. Sensorineural hearing loss (SNHL) of both ears        Plan:       Advised to not put anything smaller that your elbow in your ear canal.  Audiogram Reviewed: B SNHL.  Hearing conservation strongly recommended.  Trial of amplification bilaterally also recommended.  Re-check of hearing in 18-24 months or sooner if subjective change noted.  Schedule HAC.  F/U with PCP as per schedule.  F/U with Dr. Camargo.  RTC within 6 months.

## 2017-10-31 NOTE — PATIENT INSTRUCTIONS
Advised to not put anything smaller that your elbow in your ear canal.  Audiogram Reviewed: B SNHL.  Hearing conservation strongly recommended.  Trial of amplification bilaterally also recommended.  Re-check of hearing in 18-24 months or sooner if subjective change noted.  Schedule HAC.  F/U with PCP as per schedule.  F/U with Dr. Camargo.  RTC within 6 months.

## 2017-11-06 ENCOUNTER — LAB VISIT (OUTPATIENT)
Dept: LAB | Facility: HOSPITAL | Age: 72
End: 2017-11-06
Attending: UROLOGY
Payer: MEDICARE

## 2017-11-06 ENCOUNTER — TELEPHONE (OUTPATIENT)
Dept: ORTHOPEDICS | Facility: CLINIC | Age: 72
End: 2017-11-06

## 2017-11-06 DIAGNOSIS — C61 PROSTATE CANCER: ICD-10-CM

## 2017-11-06 DIAGNOSIS — S82.202F: Primary | ICD-10-CM

## 2017-11-06 DIAGNOSIS — S82.402F: Primary | ICD-10-CM

## 2017-11-06 PROCEDURE — 84153 ASSAY OF PSA TOTAL: CPT

## 2017-11-06 PROCEDURE — 36415 COLL VENOUS BLD VENIPUNCTURE: CPT

## 2017-11-06 NOTE — TELEPHONE ENCOUNTER
Therapy orders sent to [facility]. Pt states that he receive a call and appointment is schedule. Patient states verbal understanding and has no further questions.

## 2017-11-06 NOTE — TELEPHONE ENCOUNTER
----- Message from Silvia Leroy PA-C sent at 11/6/2017  1:12 PM CST -----  Contact: spouse  New order palced  ----- Message -----  From: Francia Hdz MA  Sent: 11/6/2017  11:35 AM  To: Silvia Leroy PA-C        ----- Message -----  From: Diane Fenton  Sent: 11/6/2017  10:44 AM  To: [facility] Staff    Spouse called in said pt need Silvia to send in the PT orders

## 2017-11-07 ENCOUNTER — TELEPHONE (OUTPATIENT)
Dept: UROLOGY | Facility: CLINIC | Age: 72
End: 2017-11-07

## 2017-11-07 LAB — COMPLEXED PSA SERPL-MCNC: 8.7 NG/ML

## 2017-11-07 NOTE — TELEPHONE ENCOUNTER
----- Message from Bri christaese sent at 11/7/2017  2:32 PM CST -----  Contact: self  Pt would like to speak to nurse in regards to appt he cancelled today. He states both him and his wife are in wheel chairs and it is very hard for them to get a ride to come in. He would to discuss if he needs to r/s.  [phone]

## 2017-11-07 NOTE — TELEPHONE ENCOUNTER
Patient states that since him ans his wife are both w/c bound, it is hard for them to get out- he wants to know if he should reschedule- I see his PSA is elevated- can you advise on if I need to r/s him?

## 2017-11-08 ENCOUNTER — PATIENT MESSAGE (OUTPATIENT)
Dept: ORTHOPEDICS | Facility: CLINIC | Age: 72
End: 2017-11-08

## 2017-11-09 ENCOUNTER — TELEPHONE (OUTPATIENT)
Dept: ORTHOPEDICS | Facility: CLINIC | Age: 72
End: 2017-11-09

## 2017-11-14 ENCOUNTER — CLINICAL SUPPORT (OUTPATIENT)
Dept: REHABILITATION | Facility: HOSPITAL | Age: 72
End: 2017-11-14
Attending: PHYSICIAN ASSISTANT
Payer: MEDICARE

## 2017-11-14 DIAGNOSIS — Z74.09 IMPAIRED MOBILITY: ICD-10-CM

## 2017-11-14 DIAGNOSIS — M67.01 HEEL CORD TIGHTNESS, RIGHT: ICD-10-CM

## 2017-11-14 DIAGNOSIS — R26.89 BALANCE PROBLEMS: ICD-10-CM

## 2017-11-14 DIAGNOSIS — S82.202F TYPE III OPEN FRACTURE OF LEFT TIBIA AND FIBULA WITH ROUTINE HEALING, SUBSEQUENT ENCOUNTER: Primary | ICD-10-CM

## 2017-11-14 DIAGNOSIS — M25.673 DECREASED RANGE OF MOTION OF ANKLE: ICD-10-CM

## 2017-11-14 DIAGNOSIS — S82.402F TYPE III OPEN FRACTURE OF LEFT TIBIA AND FIBULA WITH ROUTINE HEALING, SUBSEQUENT ENCOUNTER: Primary | ICD-10-CM

## 2017-11-14 DIAGNOSIS — R29.898 RIGHT LEG WEAKNESS: ICD-10-CM

## 2017-11-14 DIAGNOSIS — M25.60 RESTRICTION OF JOINT MOTION: ICD-10-CM

## 2017-11-14 PROCEDURE — G8979 MOBILITY GOAL STATUS: HCPCS | Mod: CK,PN | Performed by: PHYSICAL THERAPIST

## 2017-11-14 PROCEDURE — 97162 PT EVAL MOD COMPLEX 30 MIN: CPT | Mod: PN | Performed by: PHYSICAL THERAPIST

## 2017-11-14 PROCEDURE — G8978 MOBILITY CURRENT STATUS: HCPCS | Mod: CL,PN | Performed by: PHYSICAL THERAPIST
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
## 2017-11-15 ENCOUNTER — CLINICAL SUPPORT (OUTPATIENT)
Dept: AUDIOLOGY | Facility: CLINIC | Age: 72
End: 2017-11-15

## 2017-11-15 DIAGNOSIS — H90.3 SENSORINEURAL HEARING LOSS, BILATERAL: Primary | ICD-10-CM

## 2017-11-15 PROCEDURE — 99499 UNLISTED E&M SERVICE: CPT | Mod: S$GLB,,, | Performed by: AUDIOLOGIST-HEARING AID FITTER

## 2017-11-15 NOTE — PROGRESS NOTES
Janusz MontgomeryJr. was seen in the clinic today for a hearing aid consult. He was accompanied by his wife and daughter.    Pricing and styles were discussed. Mr. Montgomery decided to order a pair of [employer] OPN 1 hearing aids in color 91 with #3 receivers and rechargeable batteries. The contract was signed and Mr. Montgomery was given a copy.     An appointment was scheduled for Mr. Montgomery to return to the clinic to  the hearing aids.
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
## 2017-11-16 ENCOUNTER — CLINICAL SUPPORT (OUTPATIENT)
Dept: REHABILITATION | Facility: HOSPITAL | Age: 72
End: 2017-11-16
Attending: PHYSICIAN ASSISTANT
Payer: MEDICARE

## 2017-11-16 ENCOUNTER — PATIENT MESSAGE (OUTPATIENT)
Dept: ORTHOPEDICS | Facility: CLINIC | Age: 72
End: 2017-11-16

## 2017-11-16 DIAGNOSIS — M25.60 RESTRICTION OF JOINT MOTION: ICD-10-CM

## 2017-11-16 DIAGNOSIS — M67.01 HEEL CORD TIGHTNESS, RIGHT: ICD-10-CM

## 2017-11-16 DIAGNOSIS — Z74.09 IMPAIRED MOBILITY: ICD-10-CM

## 2017-11-16 DIAGNOSIS — R26.89 BALANCE PROBLEMS: ICD-10-CM

## 2017-11-16 DIAGNOSIS — M25.673 DECREASED RANGE OF MOTION OF ANKLE: ICD-10-CM

## 2017-11-16 DIAGNOSIS — S82.402F TYPE III OPEN FRACTURE OF LEFT TIBIA AND FIBULA WITH ROUTINE HEALING, SUBSEQUENT ENCOUNTER: Primary | ICD-10-CM

## 2017-11-16 DIAGNOSIS — R29.898 RIGHT LEG WEAKNESS: ICD-10-CM

## 2017-11-16 DIAGNOSIS — S82.202F TYPE III OPEN FRACTURE OF LEFT TIBIA AND FIBULA WITH ROUTINE HEALING, SUBSEQUENT ENCOUNTER: Primary | ICD-10-CM

## 2017-11-16 PROCEDURE — 97110 THERAPEUTIC EXERCISES: CPT | Mod: PN | Performed by: PHYSICAL THERAPIST

## 2017-11-16 NOTE — PROGRESS NOTES
"                                          Physical Therapy Daily Note           Name: Janusz Montgomery Jr.  Clinic Number: [id number]  Date of Treatment: 11/16/2017   Diagnosis:   Encounter Diagnoses   Name Primary?    Type III open fracture of left tibia and fibula with routine healing, subsequent encounter Yes    Decreased range of motion of ankle     Restriction of joint motion     Limited passive range of motion on supination of midtarsal joint     Heel cord tightness, right     Balance problems     Right leg weakness     Impaired mobility        Time in: 1510  Time Out: 1620  Total Treatment Time: 70    VISITS / PURDY: 2/20 - 12/31/207  BOLD=INDICATES ACTIVITIES PERFORMED.      Subjective  Patient states "there is no pain in the ankle this afternoon. He says that it felt OK after the last session.  Pain level - 0/10    Objective    Treatment: Patient  was instructed in and performed therapeutic exercises to develop strength, ROM, flexibility, balance and joint mobility. Patient performed therapeutic exercises consisting of the following      Leg press   Recumbent bike  Upright bike   UE ergometer  Treadmill   Elliptical       THERAPEUTIC EXERCISE  SUPINE  -heel cord stretch 5'   -DF/PF x50 knee flexed at 30   -inver/ever x50   -circles x40 ea  -ABCs       SIDELYING  -eversion / inversion      PRONE      STANDING.    SITTING  +toe curls   +prostretch  +inversion  +DF knee flexed / knee extended  +anterior tib stretch  +heel cord stretch        MANUAL THERAPY: manual mobs at the mid tarsal joints, sub talar and tibiotalar joints  MODALITY  DIRECT EDUCATION:     Assessment    Completed activities w/o pain. There is significant intra-articular joint restriction along with tissue extensibility restrictions in the achilles, peroneals and posterior tibialis.   Medical Necessity is demonstrated by:  Fall Risk,  Unable/ limited ability  to participate in daily activities  Limited mobility, weight bearing status, " diminished neuromuscular response and weakness limits function of effected part for some activities, Requires skilled supervision to complete and progress treatment interventions and HEP.  Pt demo good understanding of the education provided. Patient demonstrated good return demonstration of activities.Patient's tolerance to treatment was good. Patient will continue to benefit from skilled PTintervention.Patient is making progress towards established goals.  New/Revised goals: no  Continue with established Plan of Care towards PT goals.       PLAN     Certification Period: 11/14/2017 to 2/14/2018  Recommended Treatment Plan: 2 times per week for 12 weeks: Manual Therapy, Neuromuscular Re-ed, Patient Education and Therapeutic Exercise  Other Recommendations: modalities PRN   Pt may be seen by PTA as part of the rehabilitation team.      Therapist: Yusuf Farooq, PT

## 2017-11-17 ENCOUNTER — DOCUMENTATION ONLY (OUTPATIENT)
Dept: ORTHOPEDICS | Facility: CLINIC | Age: 72
End: 2017-11-17

## 2017-11-17 DIAGNOSIS — S82.402F: Primary | ICD-10-CM

## 2017-11-17 DIAGNOSIS — S82.202F: Primary | ICD-10-CM

## 2017-11-17 NOTE — PROGRESS NOTES
the bone fracture site is incompletely healed and has the need for continued use of the exogen ultrasound bone healing system. New order placed.

## 2017-11-20 ENCOUNTER — CLINICAL SUPPORT (OUTPATIENT)
Dept: REHABILITATION | Facility: HOSPITAL | Age: 72
End: 2017-11-20
Attending: PHYSICIAN ASSISTANT
Payer: MEDICARE

## 2017-11-20 DIAGNOSIS — S82.202F TYPE III OPEN FRACTURE OF LEFT TIBIA AND FIBULA WITH ROUTINE HEALING, SUBSEQUENT ENCOUNTER: Primary | ICD-10-CM

## 2017-11-20 DIAGNOSIS — M25.60 RESTRICTION OF JOINT MOTION: ICD-10-CM

## 2017-11-20 DIAGNOSIS — M25.673 DECREASED RANGE OF MOTION OF ANKLE: ICD-10-CM

## 2017-11-20 DIAGNOSIS — Z74.09 IMPAIRED MOBILITY: ICD-10-CM

## 2017-11-20 DIAGNOSIS — R26.89 BALANCE PROBLEMS: ICD-10-CM

## 2017-11-20 DIAGNOSIS — S82.402F TYPE III OPEN FRACTURE OF LEFT TIBIA AND FIBULA WITH ROUTINE HEALING, SUBSEQUENT ENCOUNTER: Primary | ICD-10-CM

## 2017-11-20 DIAGNOSIS — M67.01 HEEL CORD TIGHTNESS, RIGHT: ICD-10-CM

## 2017-11-20 DIAGNOSIS — R29.898 RIGHT LEG WEAKNESS: ICD-10-CM

## 2017-11-20 PROCEDURE — 97110 THERAPEUTIC EXERCISES: CPT | Mod: PN | Performed by: PHYSICAL THERAPIST

## 2017-11-20 NOTE — PROGRESS NOTES
"                                          Physical Therapy Daily Note           Name: Janusz Montgomery Jr.  Clinic Number: [id number]  Date of Treatment: 11/20/2017   Diagnosis:   Encounter Diagnoses   Name Primary?    Type III open fracture of left tibia and fibula with routine healing, subsequent encounter Yes    Restriction of joint motion     Decreased range of motion of ankle     Limited passive range of motion on supination of midtarsal joint     Heel cord tightness, right     Balance problems     Right leg weakness     Impaired mobility        Time in: 1115  Time Out:1215   Total Treatment Time: 60    VISITS / PURDY: 3/20 - 12/31/207  BOLD=INDICATES ACTIVITIES PERFORMED.      Subjective  Patient states the ankle is feeling fine and there is no pain.   Pain level - 0/10    Objective    Treatment: Patient  was instructed in and performed therapeutic exercises to develop strength, ROM, flexibility, balance and joint mobility. Patient performed therapeutic exercises consisting of the following      Leg press   Recumbent bike  Upright bike   UE ergometer  Treadmill   Elliptical       THERAPEUTIC EXERCISE  SUPINE  -heel cord stretch 5'   -DF/PF x50 knee flexed at 30   -inver/ever x50   -circles x40 ea  -ABCs   -posterioir tib and peroneal stretch 30" x 4 ea. W/strap     SIDELYING  -eversion / inversion x30    PRONE    STANDING.    SITTING  -toe curls x20  -toes stretch 1' x 2   -prostretch 2'  -inversion x20  +DF knee flexed / knee extended  -anterior tib stretch  +heel cord stretch        MANUAL THERAPY: manual mobs at the  sub talar joint in side lying.   MODALITY  DIRECT EDUCATION:     Assessment    Completed activities w/o increased pain. Mobility of the sub-talar joint is markedly compromised. Progress with weight bearing activities / CC out of the boot. No pain demonstrated at the end of the session.   Medical Necessity is demonstrated by:  Fall Risk,  Unable/ limited ability  to participate in daily " activities  Limited mobility, weight bearing status, diminished neuromuscular response and weakness limits function of effected part for some activities, Requires skilled supervision to complete and progress treatment interventions and HEP.  Pt demo good understanding of the education provided. Patient demonstrated good return demonstration of activities.Patient's tolerance to treatment was good. Patient will continue to benefit from skilled PTintervention.Patient is making progress towards established goals.  New/Revised goals: no  Continue with established Plan of Care towards PT goals.       PLAN     Certification Period: 11/14/2017 to 2/14/2018  Recommended Treatment Plan: 2 times per week for 12 weeks: Manual Therapy, Neuromuscular Re-ed, Patient Education and Therapeutic Exercise  Other Recommendations: modalities PRN   Pt may be seen by PTA as part of the rehabilitation team.      Therapist: Yusuf Farooq, PT

## 2017-11-22 ENCOUNTER — CLINICAL SUPPORT (OUTPATIENT)
Dept: REHABILITATION | Facility: HOSPITAL | Age: 72
End: 2017-11-22
Attending: PHYSICIAN ASSISTANT
Payer: MEDICARE

## 2017-11-22 DIAGNOSIS — S82.402F TYPE III OPEN FRACTURE OF LEFT TIBIA AND FIBULA WITH ROUTINE HEALING, SUBSEQUENT ENCOUNTER: Primary | ICD-10-CM

## 2017-11-22 DIAGNOSIS — R26.89 BALANCE PROBLEMS: ICD-10-CM

## 2017-11-22 DIAGNOSIS — M67.01 HEEL CORD TIGHTNESS, RIGHT: ICD-10-CM

## 2017-11-22 DIAGNOSIS — M25.60 RESTRICTION OF JOINT MOTION: ICD-10-CM

## 2017-11-22 DIAGNOSIS — M25.673 DECREASED RANGE OF MOTION OF ANKLE: ICD-10-CM

## 2017-11-22 DIAGNOSIS — Z74.09 IMPAIRED MOBILITY: ICD-10-CM

## 2017-11-22 DIAGNOSIS — R29.898 RIGHT LEG WEAKNESS: ICD-10-CM

## 2017-11-22 DIAGNOSIS — S82.202F TYPE III OPEN FRACTURE OF LEFT TIBIA AND FIBULA WITH ROUTINE HEALING, SUBSEQUENT ENCOUNTER: Primary | ICD-10-CM

## 2017-11-22 PROCEDURE — 97110 THERAPEUTIC EXERCISES: CPT | Mod: PN | Performed by: PHYSICAL THERAPIST

## 2017-11-22 NOTE — PROGRESS NOTES
"                                          Physical Therapy Daily Note           Name: Janusz Montgomery Jr.  Clinic Number: [id number]  Date of Treatment: 11/22/2017   Diagnosis:   Encounter Diagnoses   Name Primary?    Type III open fracture of left tibia and fibula with routine healing, subsequent encounter Yes    Restriction of joint motion     Limited passive range of motion on supination of midtarsal joint     Heel cord tightness, right     Right leg weakness     Decreased range of motion of ankle     Impaired mobility     Balance problems        Time in: 1115  Time Out:1225  Total Treatment Time: 70    VISITS / PURDY: 4/20 - 12/31/207  BOLD=INDICATES ACTIVITIES PERFORMED.      Subjective  Patient states there is no pain in the ankle / leg.   Pain level - 0/10    Objective    Treatment: Patient  was instructed in and performed therapeutic exercises to develop strength, ROM, flexibility, balance and joint mobility. Patient performed therapeutic exercises consisting of the following      Leg press   Recumbent bike  Upright bike   UE ergometer  Treadmill   Elliptical       THERAPEUTIC EXERCISE  SUPINE  -heel cord stretch 5'   -DF/PF x50 knee flexed at 30   -inver/ever x50   -circles x40 ea  -ABCs   -posterioir tib and peroneal stretch 30" x 4 ea. W/strap     SIDELYING  -eversion / inversion x30    PRONE    STANDING.    SITTING  -toe curls x20  -toes stretch 1' x 2   -prostretch 2'  -inversion x20  -DF knee flexed / knee extended x20ea  -anterior tib stretch 30" x 4 with DF / PF x15   +heel cord stretch        MANUAL THERAPY: manual mobs at the  sub talar joint in side lying.   MODALITY  DIRECT EDUCATION:     Assessment    Activities performed. He did not encounter increased pain. Slow progression with passive intra-articular mobility. Capsular end feel. Will progress with weight bearing w/o the boot.    Medical Necessity is demonstrated by:  Fall Risk,  Unable/ limited ability  to participate in daily activities  " Limited mobility, weight bearing status, diminished neuromuscular response and weakness limits function of effected part for some activities, Requires skilled supervision to complete and progress treatment interventions and HEP.  Pt demo good understanding of the education provided. Patient demonstrated good return demonstration of activities.Patient's tolerance to treatment was good. Patient will continue to benefit from skilled PTintervention.Patient is making progress towards established goals.  New/Revised goals: no  Continue with established Plan of Care towards PT goals.       PLAN     Certification Period: 11/14/2017 to 2/14/2018  Recommended Treatment Plan: 2 times per week for 12 weeks: Manual Therapy, Neuromuscular Re-ed, Patient Education and Therapeutic Exercise  Other Recommendations: modalities PRN   Pt may be seen by PTA as part of the rehabilitation team.      Therapist: Yusuf Farooq, PT

## 2017-11-28 ENCOUNTER — CLINICAL SUPPORT (OUTPATIENT)
Dept: REHABILITATION | Facility: HOSPITAL | Age: 72
End: 2017-11-28
Attending: PHYSICIAN ASSISTANT
Payer: MEDICARE

## 2017-11-28 ENCOUNTER — PATIENT MESSAGE (OUTPATIENT)
Dept: ORTHOPEDICS | Facility: CLINIC | Age: 72
End: 2017-11-28

## 2017-11-28 ENCOUNTER — DOCUMENTATION ONLY (OUTPATIENT)
Dept: ORTHOPEDICS | Facility: CLINIC | Age: 72
End: 2017-11-28

## 2017-11-28 DIAGNOSIS — S82.402F: Primary | ICD-10-CM

## 2017-11-28 DIAGNOSIS — S82.202F: Primary | ICD-10-CM

## 2017-11-28 DIAGNOSIS — S82.202F TYPE III OPEN FRACTURE OF LEFT TIBIA AND FIBULA WITH ROUTINE HEALING, SUBSEQUENT ENCOUNTER: Primary | ICD-10-CM

## 2017-11-28 DIAGNOSIS — R29.898 RIGHT LEG WEAKNESS: ICD-10-CM

## 2017-11-28 DIAGNOSIS — S82.402F TYPE III OPEN FRACTURE OF LEFT TIBIA AND FIBULA WITH ROUTINE HEALING, SUBSEQUENT ENCOUNTER: Primary | ICD-10-CM

## 2017-11-28 DIAGNOSIS — M67.01 HEEL CORD TIGHTNESS, RIGHT: ICD-10-CM

## 2017-11-28 DIAGNOSIS — M25.673 DECREASED RANGE OF MOTION OF ANKLE: ICD-10-CM

## 2017-11-28 DIAGNOSIS — R26.89 BALANCE PROBLEMS: ICD-10-CM

## 2017-11-28 DIAGNOSIS — Z74.09 IMPAIRED MOBILITY: ICD-10-CM

## 2017-11-28 DIAGNOSIS — M25.60 RESTRICTION OF JOINT MOTION: ICD-10-CM

## 2017-11-28 PROCEDURE — 97110 THERAPEUTIC EXERCISES: CPT | Mod: PN | Performed by: PHYSICAL THERAPIST

## 2017-11-28 NOTE — PROGRESS NOTES
"                                          Physical Therapy Daily Note           Name: Janusz Montgomery Jr.  Clinic Number: [id number]  Date of Treatment: 11/28/2017   Diagnosis:   Encounter Diagnoses   Name Primary?    Type III open fracture of left tibia and fibula with routine healing, subsequent encounter Yes    Restriction of joint motion     Limited passive range of motion on supination of midtarsal joint     Heel cord tightness, right     Impaired mobility     Decreased range of motion of ankle     Right leg weakness     Balance problems        Time in: 1610  Time Out:1715  Total Treatment Time: 65    VISITS / PURDY: 5/20 - 12/31/207  BOLD=INDICATES ACTIVITIES PERFORMED.      Subjective  Patient states there is no pain in the ankle / leg.   Pain level - 0/10    Objective    Treatment: Patient  was instructed in and performed therapeutic exercises to develop strength, ROM, flexibility, balance and joint mobility. Patient performed therapeutic exercises consisting of the following      Leg press   Recumbent bike  Upright bike   UE ergometer  Treadmill   Elliptical       THERAPEUTIC EXERCISE  SUPINE  -heel cord stretch 5'   -DF/PF x50 knee flexed at 30   -inver/ever x50   -circles x40 ea  -ABCs   -posterioir tib and peroneal stretch 30" x 4 ea. W/strap     SIDELYING  -eversion / inversion x30    PRONE    STANDING.    SITTING  -toe curls x20  -toes stretch 1' x 2   -prostretch 2'  -inversion x20  -DF knee flexed / knee extended x20ea  -anterior tib stretch 30" x 4 with DF / PF x15   +soleus stretch   +self stretch ( ant tib, peroneal, post tib )        MANUAL THERAPY: manual mobs at the  sub talar joint in side lying.   MODALITY  DIRECT EDUCATION:    Patient was issued HEP   Written Home Exercises Reviewed.   Pt demo good understanding of the education provided. Patient demonstrated good return demonstration of activities       Assessment    FOTO NEXT SESSION  Completed routine w/o limitation. He remains motion " deficient however he perceives increased mobility. Does not demonstrate increased pain during the exercises.   Medical Necessity is demonstrated by:  Fall Risk,  Unable/ limited ability  to participate in daily activities  Limited mobility, weight bearing status, diminished neuromuscular response and weakness limits function of effected part for some activities, Requires skilled supervision to complete and progress treatment interventions and HEP.  Pt demo good understanding of the education provided. Patient demonstrated good return demonstration of activities.Patient's tolerance to treatment was good. Patient will continue to benefit from skilled PTintervention.Patient is making progress towards established goals.  New/Revised goals: no  Continue with established Plan of Care towards PT goals.       PLAN     Certification Period: 11/14/2017 to 2/14/2018  Recommended Treatment Plan: 2 times per week for 12 weeks: Manual Therapy, Neuromuscular Re-ed, Patient Education and Therapeutic Exercise  Other Recommendations: modalities PRN   Pt may be seen by PTA as part of the rehabilitation team.      Therapist: Yusuf Farooq, PT

## 2017-11-29 ENCOUNTER — CLINICAL SUPPORT (OUTPATIENT)
Dept: AUDIOLOGY | Facility: CLINIC | Age: 72
End: 2017-11-29

## 2017-11-29 DIAGNOSIS — H90.3 SENSORINEURAL HEARING LOSS, BILATERAL: Primary | ICD-10-CM

## 2017-11-29 NOTE — PROGRESS NOTES
Janusz Montgomery was seen in the clinic today to  his Oticon OPN 1 hearing aids with a #3 100 speaker and 8mm power dome on the right ear and a #3 85 speaker with 10 mm bass double vent dome on the left ear.    Personalization was performed. Acclimatization was set to 1. The alerts were demonstrated. Mr. Montgomery was pleased with how the hearing aids sounded. He was shown how to properly place the hearing aids on the  and how to open and close the battery door as well as how to change the battery if needed. Proper insertion/removal and care and maintenance were also reviewed. The hearing aids were paired to the iPhone. Mr. Montgomery was shown how to adjust the volume of the hearing aids with the phone.    A 2 week follow-up appointment was scheduled. Mr. Montgomery was encouraged to call the clinic if he needed to be seen sooner.

## 2017-12-01 ENCOUNTER — PATIENT MESSAGE (OUTPATIENT)
Dept: ORTHOPEDICS | Facility: CLINIC | Age: 72
End: 2017-12-01

## 2017-12-01 ENCOUNTER — CLINICAL SUPPORT (OUTPATIENT)
Dept: REHABILITATION | Facility: HOSPITAL | Age: 72
End: 2017-12-01
Attending: PHYSICIAN ASSISTANT
Payer: MEDICARE

## 2017-12-01 DIAGNOSIS — M25.60 RESTRICTION OF JOINT MOTION: ICD-10-CM

## 2017-12-01 DIAGNOSIS — S82.402F TYPE III OPEN FRACTURE OF LEFT TIBIA AND FIBULA WITH ROUTINE HEALING, SUBSEQUENT ENCOUNTER: Primary | ICD-10-CM

## 2017-12-01 DIAGNOSIS — Z74.09 IMPAIRED MOBILITY: ICD-10-CM

## 2017-12-01 DIAGNOSIS — S82.202F TYPE III OPEN FRACTURE OF LEFT TIBIA AND FIBULA WITH ROUTINE HEALING, SUBSEQUENT ENCOUNTER: Primary | ICD-10-CM

## 2017-12-01 DIAGNOSIS — M25.673 DECREASED RANGE OF MOTION OF ANKLE: ICD-10-CM

## 2017-12-01 DIAGNOSIS — R26.89 BALANCE PROBLEMS: ICD-10-CM

## 2017-12-01 PROCEDURE — 97110 THERAPEUTIC EXERCISES: CPT | Mod: PN | Performed by: PHYSICAL THERAPIST

## 2017-12-01 PROCEDURE — G8979 MOBILITY GOAL STATUS: HCPCS | Mod: CK,PN | Performed by: PHYSICAL THERAPIST

## 2017-12-01 PROCEDURE — G8978 MOBILITY CURRENT STATUS: HCPCS | Mod: CK,PN | Performed by: PHYSICAL THERAPIST

## 2017-12-01 NOTE — PROGRESS NOTES
"                                          Physical Therapy Daily Note           Name: Janusz Montgomery Jr.  Clinic Number: [id number]  Date of Treatment: 12/01/2017   Diagnosis:   Encounter Diagnoses   Name Primary?    Type III open fracture of left tibia and fibula with routine healing, subsequent encounter Yes    Restriction of joint motion     Limited passive range of motion on supination of midtarsal joint     Balance problems     Decreased range of motion of ankle     Impaired mobility        Time in: 917  Time Out:1010  Total Treatment Time: 53'     VISITS / PURDY: 6/20 - 12/31/207  BOLD=INDICATES ACTIVITIES PERFORMED.      Subjective  Patient states there is no pain in the ankle / leg. Says he can feel that the toes and ankle are moving more but he knows you cant see it. Reports that mobilizing the foot feels good and creates more mobility.   Pain level - 0/10    Objective    Treatment: Patient  was instructed in and performed therapeutic exercises to develop strength, ROM, flexibility, balance and joint mobility. Patient performed therapeutic exercises consisting of the following      Leg press   Recumbent bike  Upright bike   UE ergometer  Treadmill   Elliptical       THERAPEUTIC EXERCISE  SUPINE  -heel cord stretch 5'   -DF/PF x50 knee flexed at 30   -inver/ever x50   -circles x40 ea  -ABCs   -posterioir tib and peroneal stretch 30" x 4 ea. W/strap     SIDELYING  -eversion / inversion x30    PRONE    STANDING.    SITTING  -toe curls x20  -toes stretch 1' x 2   -prostretch 2'  -inversion x20  -DF knee flexed / knee extended x20ea  -anterior tib stretch 30" x 4 with DF / PF x15   +soleus stretch   -self stretch ( ant tib, peroneal, post tib ) 10" x 12 ea       MANUAL THERAPY: manual mobs at the  sub talar joint in side lying and tibiotalar joint supine . 1-1 PT = 10'   MODALITY  DIRECT EDUCATION:    Patient was issued HEP   Written Home Exercises Reviewed.   Pt demo good understanding of the education " provided. Patient demonstrated good return demonstration of activities       Assessment    Completed his routine w/o limitation from pain. Good response to manual  Intervention. Performed self stretches with adequate technique. Progress with CC activities.   Medical Necessity is demonstrated by:  Fall Risk,  Unable/ limited ability  to participate in daily activities  Limited mobility, weight bearing status, diminished neuromuscular response and weakness limits function of effected part for some activities, Requires skilled supervision to complete and progress treatment interventions and HEP.  Pt demo good understanding of the education provided. Patient demonstrated good return demonstration of activities.Patient's tolerance to treatment was good. Patient will continue to benefit from skilled PTintervention.Patient is making progress towards established goals.  New/Revised goals: no  Continue with established Plan of Care towards PT goals.   CMS Impairment/Limitation/Restriction for FOTO Lower Leg (w/o Knee) Survey  Status Limitation G-Code CMS Severity Modifier  Intake 30% 70%  Predicted 42% 58% Goal Status+ CK - At least 40 percent but less than 60 percent  12/1/2017 41% 59% Current Status CK - At least 40 percent but less than 60 percent  D/C Status CK **only report if this is discharge survey  +Based on FOTO predicted change score  * Mean, Risk Adjusted, Intake Composite FS measures from FOTO aggregate database.  ** As indicated by the ICF assignments to the survey items in the FOTO survey used.  [facility] [place]  FUNCTIONAL STATUS SUMMARY (11/14/2017)  Patient: Janusz Montgomery Jr. HATFIELD ([id number]) Primary Body Part: Lower Leg (w/o Knee) Initial DOS: 11/14/2017  Produced and © [id number] by  [employer]    PLAN     Certification Period: 11/14/2017 to 2/14/2018  Recommended Treatment Plan: 2 times per week for 12 weeks: Manual Therapy, Neuromuscular Re-ed, Patient  Education and Therapeutic Exercise  Other Recommendations: modalities PRN   Pt may be seen by PTA as part of the rehabilitation team.      Therapist: Yusuf Farooq PT

## 2017-12-05 ENCOUNTER — CLINICAL SUPPORT (OUTPATIENT)
Dept: REHABILITATION | Facility: HOSPITAL | Age: 72
End: 2017-12-05
Attending: PHYSICIAN ASSISTANT
Payer: MEDICARE

## 2017-12-05 DIAGNOSIS — M25.60 RESTRICTION OF JOINT MOTION: ICD-10-CM

## 2017-12-05 DIAGNOSIS — R29.898 RIGHT LEG WEAKNESS: ICD-10-CM

## 2017-12-05 DIAGNOSIS — M25.673 DECREASED RANGE OF MOTION OF ANKLE: ICD-10-CM

## 2017-12-05 DIAGNOSIS — S82.402F TYPE III OPEN FRACTURE OF LEFT TIBIA AND FIBULA WITH ROUTINE HEALING, SUBSEQUENT ENCOUNTER: Primary | ICD-10-CM

## 2017-12-05 DIAGNOSIS — S82.202F TYPE III OPEN FRACTURE OF LEFT TIBIA AND FIBULA WITH ROUTINE HEALING, SUBSEQUENT ENCOUNTER: Primary | ICD-10-CM

## 2017-12-05 DIAGNOSIS — Z74.09 IMPAIRED MOBILITY: ICD-10-CM

## 2017-12-05 DIAGNOSIS — M67.01 HEEL CORD TIGHTNESS, RIGHT: ICD-10-CM

## 2017-12-05 DIAGNOSIS — R26.89 BALANCE PROBLEMS: ICD-10-CM

## 2017-12-05 PROCEDURE — 97110 THERAPEUTIC EXERCISES: CPT | Mod: PN | Performed by: PHYSICAL THERAPIST

## 2017-12-05 NOTE — PROGRESS NOTES
"                                          Physical Therapy Daily Note           Name: Janusz Montgomery Jr.  Clinic Number: [id number]  Date of Treatment: 12/05/2017   Diagnosis:   Encounter Diagnoses   Name Primary?    Type III open fracture of left tibia and fibula with routine healing, subsequent encounter Yes    Limited passive range of motion on supination of midtarsal joint     Decreased range of motion of ankle     Heel cord tightness, right     Right leg weakness     Impaired mobility     Balance problems     Restriction of joint motion        Time in: 1610  Time Out:1710  Total Treatment Time:  60     VISITS / UPRDY: 7/20 - 12/31/207  BOLD=INDICATES ACTIVITIES PERFORMED.      Subjective  Patient states the foot and ankle are moving better than initially. Says he continues to stretch the toes and foot at home.   Pain level - 0/10 reported.     Objective    Treatment: Patient  was instructed in and performed therapeutic exercises to develop strength, ROM, flexibility, balance and joint mobility. Patient performed therapeutic exercises consisting of the following      Leg press   Recumbent bike  Upright bike   UE ergometer  Treadmill   Elliptical       THERAPEUTIC EXERCISE  SUPINE  -heel cord stretch 5'   -DF/PF x50 knee flexed at 30   -inver/ever x50   -circles x40 ea  -ABCs   -posterioir tib and peroneal stretch 30" x 4 ea. W/strap     SIDELYING  -eversion / inversion x30    PRONE    STANDING.  -gastroc stretch 2'  -soleus stretch 2'   -Toe stretch 2'  -heel raises x20    SITTING  -toe curls x20  -toes stretch 1' x 2   -prostretch 2'  -inversion x20  -DF knee flexed / knee extended x20ea  -anterior tib stretch 30" x 4 with DF / PF x15   +soleus stretch   -self stretch ( ant tib, peroneal, post tib ) 10" x 12 ea       MANUAL THERAPY: manual mobs at the  sub talar joint in side lying and tibiotalar joint supine NP   MODALITY  DIRECT EDUCATION:    Patient was issued HEP        Assessment    Continues to be " motion deficient due to tissue extensibility restrictions and joint mobility dysfunctions. He did not have any pain issues with standing stretches. There is significant mobility deficit at the toes and at the subtalar joint.   Medical Necessity is demonstrated by:  Fall Risk,  Unable/ limited ability  to participate in daily activities  Limited mobility, weight bearing status, diminished neuromuscular response and weakness limits function of effected part for some activities, Requires skilled supervision to complete and progress treatment interventions and HEP.  Pt demo good understanding of the education provided. Patient demonstrated good return demonstration of activities.Patient's tolerance to treatment was good. Patient will continue to benefit from skilled PTintervention.Patient is making progress towards established goals.  New/Revised goals: no  Continue with established Plan of Care towards PT goals.   CMS Impairment/Limitation/Restriction for FOTO Lower Leg (w/o Knee) Survey  Status Limitation G-Code CMS Severity Modifier  Intake 30% 70%  Predicted 42% 58% Goal Status+ CK - At least 40 percent but less than 60 percent  12/1/2017 41% 59% Current Status CK - At least 40 percent but less than 60 percent  D/C Status CK **only report if this is discharge survey  +Based on FOTO predicted change score  * Mean, Risk Adjusted, Intake Composite FS measures from FOTO aggregate database.  ** As indicated by the ICF assignments to the survey items in the FOTO survey used.  [facility] - [facility] [place]  FUNCTIONAL STATUS SUMMARY (11/14/2017)  Patient: Janusz Montgomery JrNgoc HATFIELD ([id number]) Primary Body Part: Lower Leg (w/o Knee) Initial DOS: 11/14/2017  Produced and © [id number] by  [employer]    PLAN     Certification Period: 11/14/2017 to 2/14/2018  Recommended Treatment Plan: 2 times per week for 12 weeks: Manual Therapy, Neuromuscular Re-ed, Patient Education and Therapeutic  Exercise  Other Recommendations: modalities PRN   Pt may be seen by PTA as part of the rehabilitation team.      Therapist: Yusuf Farooq PT

## 2017-12-06 ENCOUNTER — OFFICE VISIT (OUTPATIENT)
Dept: FAMILY MEDICINE | Facility: CLINIC | Age: 72
End: 2017-12-06
Payer: MEDICARE

## 2017-12-06 ENCOUNTER — CLINICAL SUPPORT (OUTPATIENT)
Dept: ELECTROPHYSIOLOGY | Facility: CLINIC | Age: 72
End: 2017-12-06
Attending: INTERNAL MEDICINE
Payer: MEDICARE

## 2017-12-06 ENCOUNTER — OFFICE VISIT (OUTPATIENT)
Dept: PODIATRY | Facility: CLINIC | Age: 72
End: 2017-12-06
Payer: MEDICARE

## 2017-12-06 VITALS
HEART RATE: 68 BPM | HEIGHT: 73 IN | OXYGEN SATURATION: 97 % | WEIGHT: 315 LBS | BODY MASS INDEX: 41.75 KG/M2 | SYSTOLIC BLOOD PRESSURE: 130 MMHG | DIASTOLIC BLOOD PRESSURE: 64 MMHG | TEMPERATURE: 98 F

## 2017-12-06 VITALS
DIASTOLIC BLOOD PRESSURE: 72 MMHG | HEART RATE: 88 BPM | BODY MASS INDEX: 41.75 KG/M2 | WEIGHT: 315 LBS | HEIGHT: 73 IN | SYSTOLIC BLOOD PRESSURE: 151 MMHG

## 2017-12-06 DIAGNOSIS — I25.10 CORONARY ARTERY DISEASE INVOLVING NATIVE CORONARY ARTERY OF NATIVE HEART WITHOUT ANGINA PECTORIS: ICD-10-CM

## 2017-12-06 DIAGNOSIS — C61 PROSTATE CANCER: ICD-10-CM

## 2017-12-06 DIAGNOSIS — Z23 NEED FOR INFLUENZA VACCINATION: ICD-10-CM

## 2017-12-06 DIAGNOSIS — K21.9 GASTROESOPHAGEAL REFLUX DISEASE WITHOUT ESOPHAGITIS: ICD-10-CM

## 2017-12-06 DIAGNOSIS — I73.9 PVD (PERIPHERAL VASCULAR DISEASE): Primary | ICD-10-CM

## 2017-12-06 DIAGNOSIS — I10 ESSENTIAL HYPERTENSION: ICD-10-CM

## 2017-12-06 DIAGNOSIS — E78.5 HYPERLIPIDEMIA, UNSPECIFIED HYPERLIPIDEMIA TYPE: ICD-10-CM

## 2017-12-06 DIAGNOSIS — I48.0 PAF (PAROXYSMAL ATRIAL FIBRILLATION): ICD-10-CM

## 2017-12-06 DIAGNOSIS — Z87.2 HEALED ULCER OF LEFT FOOT ON EXAMINATION: ICD-10-CM

## 2017-12-06 DIAGNOSIS — Z95.818 STATUS POST PLACEMENT OF IMPLANTABLE LOOP RECORDER: ICD-10-CM

## 2017-12-06 DIAGNOSIS — R06.02 SOB (SHORTNESS OF BREATH) ON EXERTION: Primary | ICD-10-CM

## 2017-12-06 DIAGNOSIS — I48.0 PAROXYSMAL ATRIAL FIBRILLATION: ICD-10-CM

## 2017-12-06 DIAGNOSIS — E66.01 MORBID OBESITY WITH BMI OF 40.0-44.9, ADULT: ICD-10-CM

## 2017-12-06 DIAGNOSIS — G47.33 OBSTRUCTIVE SLEEP APNEA ON CPAP: ICD-10-CM

## 2017-12-06 DIAGNOSIS — F41.9 ANXIETY: ICD-10-CM

## 2017-12-06 DIAGNOSIS — E55.9 VITAMIN D DEFICIENCY DISEASE: ICD-10-CM

## 2017-12-06 PROCEDURE — 99999 PR PBB SHADOW E&M-EST. PATIENT-LVL V: CPT | Mod: PBBFAC,,, | Performed by: NURSE PRACTITIONER

## 2017-12-06 PROCEDURE — 99499 UNLISTED E&M SERVICE: CPT | Mod: S$GLB,,, | Performed by: NURSE PRACTITIONER

## 2017-12-06 PROCEDURE — 99999 PR PBB SHADOW E&M-EST. PATIENT-LVL III: CPT | Mod: PBBFAC,,, | Performed by: PODIATRIST

## 2017-12-06 PROCEDURE — 99213 OFFICE O/P EST LOW 20 MIN: CPT | Mod: S$GLB,,, | Performed by: PODIATRIST

## 2017-12-06 PROCEDURE — G0008 ADMIN INFLUENZA VIRUS VAC: HCPCS | Mod: S$GLB,,, | Performed by: INTERNAL MEDICINE

## 2017-12-06 PROCEDURE — 90662 IIV NO PRSV INCREASED AG IM: CPT | Mod: S$GLB,,, | Performed by: INTERNAL MEDICINE

## 2017-12-06 PROCEDURE — 99214 OFFICE O/P EST MOD 30 MIN: CPT | Mod: S$GLB,,, | Performed by: NURSE PRACTITIONER

## 2017-12-06 PROCEDURE — 93298 REM INTERROG DEV EVAL SCRMS: CPT | Mod: S$GLB,,, | Performed by: INTERNAL MEDICINE

## 2017-12-06 PROCEDURE — 93299 LOOP RECORDER REMOTE: CPT | Mod: S$GLB,,, | Performed by: INTERNAL MEDICINE

## 2017-12-06 PROCEDURE — 99499 UNLISTED E&M SERVICE: CPT | Mod: S$GLB,,, | Performed by: PODIATRIST

## 2017-12-06 RX ORDER — ALPRAZOLAM 0.5 MG/1
0.5 TABLET, EXTENDED RELEASE ORAL DAILY
Qty: 15 TABLET | Refills: 0 | Status: SHIPPED | OUTPATIENT
Start: 2017-12-06 | End: 2018-02-07

## 2017-12-06 NOTE — PROGRESS NOTES
Subjective:      Patient ID: Janusz Montgomery Jr. is a 72 y.o. male.    Chief Complaint: Foot Problem (recheck left heel pcp Dr. Weathers 4/28/17)    Janusz Montgomery Jr. is a 72 y.o. male returns to clinic for follow up of left foot ulcers.  Patient's wife has been inspecting foot daily.  He relates callus formation where wound was present and concern on if ulceration may be present beneath callus.  Patient in PT and relates pain when he applies pressure to posterior heel.  Patient has been moisturizing skin daily.   No new pedal complaints. Patient admits to daily soaks    Shoe gear: crocs left and tennis shoe right    Chief Complaint   Patient presents with    Foot Problem     recheck left heel pcp Dr. Weathers 4/28/17       Hemoglobin A1C   Date Value Ref Range Status   07/11/2016 5.3 4.5 - 6.2 % Final     Comment:     According to ADA guidelines, hemoglobin A1C <7.0% represents  optimal control in non-pregnant diabetic patients.  Different  metrics may apply to specific populations.   Standards of Medical Care in Diabetes - 2016.  For the purpose of screening for the presence of diabetes:  <5.7%     Consistent with the absence of diabetes  5.7-6.4%  Consistent with increasing risk for diabetes   (prediabetes)  >or=6.5%  Consistent with diabetes  Currently no consensus exists for use of hemoglobin A1C  for diagnosis of diabetes for children.     03/31/2015 5.3 4.5 - 6.2 % Final   03/03/2014 5.6 4.5 - 6.2 % Final         Current Outpatient Prescriptions on File Prior to Visit   Medication Sig Dispense Refill    acyclovir (ZOVIRAX) 800 MG Tab Take 1 tablet (800 mg total) by mouth once daily. 90 tablet 4    artificial tears (ISOPTO TEARS) 0.5 % ophthalmic solution Place 1 drop into both eyes as needed. (Patient taking differently: Place 1 drop into both eyes as needed (for dry eyes). )      aspirin 325 MG tablet Take 1 tablet (325 mg total) by mouth once daily.  0    atorvastatin (LIPITOR) 40 MG tablet Take 1  tablet (40 mg total) by mouth once daily. 90 tablet 0    CYANOCOBALAMIN, VITAMIN B-12, (VITAMIN B-12 ORAL) Take 2,500 mcg by mouth once daily.      furosemide (LASIX) 20 MG tablet Take 1 tablet (20 mg total) by mouth 2 (two) times daily. 10 tablet 0    gabapentin (NEURONTIN) 300 MG capsule Take 1 capsule (300 mg total) by mouth 2 (two) times daily. 90 capsule 11    melatonin 3 mg Tab Take 6 mg by mouth nightly as needed (for sleep).       ramipril (ALTACE) 5 MG capsule Take 1 capsule (5 mg total) by mouth once daily. 90 capsule 0    tamsulosin (FLOMAX) 0.4 mg Cp24 Take 1 capsule (0.4 mg total) by mouth once daily. 90 capsule 3    venlafaxine (EFFEXOR) 75 MG tablet Take 2 tablets (150 mg total) by mouth 2 (two) times daily. 360 tablet 0    vitamin D 1000 units Tab Take 1 tablet (1,000 Units total) by mouth once daily.      zinc sulfate (ZINCATE) 220 (50) mg capsule Take 1 capsule (220 mg total) by mouth once daily.       No current facility-administered medications on file prior to visit.        Allergies   Allergen Reactions    Ciprofloxacin Rash     Diffuse pruritic morbilliform rash developed 3/15/2017 after dose of cipro; previously in 2/2017 he had rash/fevers after initiation of cipro    Zosyn [Piperacillin-Tazobactam] Anaphylaxis     Diffuse pruritic morbilliform rash developed 3/15/2017.  Then, 430am dose on 3/16 and rash worsened with SOB/tachypnea but no hypoxemia.     Bacitracin Itching and Rash     Violaceous rash in area of topical Tx.        Past Surgical History:   Procedure Laterality Date    Cardiac stenting x2      CATARACT EXTRACTION W/  INTRAOCULAR LENS IMPLANT Right 3/29/2016    Dr. Conteh    CATARACT EXTRACTION W/  INTRAOCULAR LENS IMPLANT Left 4/12/2016        EXTERNAL FIXATION TIBIAL FRACTURE Left 07/01/2016    ORIF TIBIA FRACTURE Left 07/15/2016    Squamous cell cancer removal x3 with Mohs surgery      TONSILLECTOMY      TOTAL KNEE ARTHROPLASTY  10/2012  "   trus/bx         Family History   Problem Relation Age of Onset    Skin cancer Father     Lung cancer Father     Cancer Father      smoker,     Alzheimer's disease Mother     Hypertension Mother     Cancer Sister      colon, lung cancer     Cancer Brother      skin cancer, polypectomy     Peripheral vascular disease      Melanoma Neg Hx     Psoriasis Neg Hx     Lupus Neg Hx     Eczema Neg Hx     Amblyopia Neg Hx     Blindness Neg Hx     Cataracts Neg Hx     Diabetes Neg Hx     Glaucoma Neg Hx     Macular degeneration Neg Hx     Retinal detachment Neg Hx     Strabismus Neg Hx     Stroke Neg Hx     Thyroid disease Neg Hx     Acne Neg Hx        Social History     Social History    Marital status:      Spouse name: N/A    Number of children: N/A    Years of education: N/A     Occupational History    Retired       Social History Main Topics    Smoking status: Never Smoker    Smokeless tobacco: Never Used    Alcohol use Yes      Comment: occasionally    Drug use: No    Sexual activity: Yes     Other Topics Concern    Not on file     Social History Narrative    No narrative on file           Review of Systems   Constitution: Negative for chills, fever and weakness.   Cardiovascular: Negative for claudication and leg swelling.   Respiratory: Negative for cough and shortness of breath.    Skin: Positive for dry skin. Negative for itching, nail changes and rash.   Musculoskeletal: Positive for arthritis, joint pain and myalgias. Negative for falls, joint swelling and muscle weakness.   Gastrointestinal: Negative for diarrhea, nausea and vomiting.   Neurological: Positive for paresthesias. Negative for numbness and tremors.   Psychiatric/Behavioral: Negative for altered mental status and hallucinations.           Objective:       Vitals:    12/06/17 1411   BP: (!) 151/72   Pulse: 88   Weight: (!) 155.6 kg (343 lb)   Height: 6' 1" (1.854 m)   PainSc: 0-No pain       Physical Exam "   Constitutional:  Non-toxic appearance. He does not have a sickly appearance. No distress.   Pt. is well-developed, well-nourished, appears stated age, in no acute distress, alert and oriented x 3. No evidence of depression, anxiety, or agitation. Calm, cooperative, and communicative. Appropriate interactions and affect.   Cardiovascular:   Pulses:       Dorsalis pedis pulses are 2+ on the right side, and 2+ on the left side.        Posterior tibial pulses are 1+ on the right side, and 1+ on the left side.        Pulmonary/Chest: No respiratory distress.   Musculoskeletal:        Right ankle: He exhibits swelling. No tenderness. No lateral malleolus, no medial malleolus, no AITFL, no CF ligament, no head of 5th metatarsal and no proximal fibula tenderness found. Achilles tendon exhibits no pain, no defect and normal Zabala's test results.        Left ankle: He exhibits swelling. No tenderness. No lateral malleolus, no medial malleolus, no AITFL, no CF ligament and no posterior TFL tenderness found. Achilles tendon exhibits no pain, no defect and normal Zabala's test results.        Right foot: There is no tenderness and no bony tenderness.        Left foot: There is no tenderness and no bony tenderness.   Decreased stride, station of gait.  apropulsive toe off.  Increased angle and base of gait.    Patient has hammertoes of digits 2-5 bilateral partially reducible without symptom today.     There is equinus deformity bilateral. There is limitation of dorsiflexion with knees extended and with knees flexed.    Shoes reveals lateral heel counter wear bilateral in athletic shoes.        Lymphadenopathy:   No lymphatic streaking    Negative lymphadenopathy bilateral popliteal fossa and tarsal tunnel.     Neurological:   Anchor-Jenn 5.07 monofilament is intact bilateral feet. Sharp/dull sensation is also intact Bilateral feet. Proprioception is grossly intact. Vibratory sensation intact (pt able to sense  vibration stop within 3-5 seconds)     Skin: Skin is warm and dry. Lesion and rash noted. No bruising, no burn and no laceration noted. He is not diaphoretic. There is erythema. No cyanosis. No pallor. Nails show no clubbing.   Ulcer location: posterior left heel  Signs of infection: local edema and erythema  Drainage:  None  Periwound: intact  Base: thin epithelial     Dermatitis and erythema noted to posterior left heel   Psychiatric: His mood appears not anxious. His affect is not inappropriate. His speech is not slurred. He is not combative. He is communicative. He is attentive.   Nursing note reviewed.    12/06                              Assessment:       Encounter Diagnoses   Name Primary?    PVD (peripheral vascular disease) Yes    Healed ulcer of left foot on examination          Plan:       Janusz was seen today for foot problem.    Diagnoses and all orders for this visit:    PVD (peripheral vascular disease)    Healed ulcer of left foot on examination      I counseled the patient on his conditions, their implications and medical management.    The wound is cleansed of foreign material as much as possible. The patient is alerted to watch for any signs of infection (redness, pus, pain, increased swelling or fever) and call if such occurs.    Wound has remained healed but with significant callus formation.  Patient advised to wear CAM boot while in chair  I advised patient to check feet daily for signs of drainage or lesion re-opening   Discussed use of daily foot moisturizer to feet, avoiding the webspace's    There is some maceration to the area; patient to keep showers under 15 minutes    Follow-up:Patient is to return to the clinic in 3-5 weeks for follow-up but should call [facility] immediately if any signs of infection, such as fever, chills, sweats, increased redness or pain.    Short-term goals include maintaining good offloading and minimizing bioburden, promoting granulation and epithelialization  to healing.  Long-term goals include keeping the wound healed by good offloading and medical management under the direction of internist.

## 2017-12-06 NOTE — PATIENT INSTRUCTIONS
Wound has healed well with no signs of continued skin breakdown noted   Ok to transition into normal shoe gear at this time. Check feet daily for signs of drainage or lesion re-opening   Use of daily foot moisturizer to feet, avoiding the webspace's  Limit showers/bathing to roughly 15 minutes during the 1st few weeks after wound has healed to prevent skin breakdown       Recommend lotions: eucerin, aquaphor, A&D ointment, gold bond for diabetics, sween; urea 40 with aloe (found on amazon.com)    Shoe recommendations: (try 6pm.com, zappos.com , nordstromrack.com, or shoes.com for discounted prices) you can visit [employer]SW shoes in [place] as well    Asics (GT 2000 or gel foundations), new balance, elham (stabil c3),  Knight (GTS or Beast or transcend), vionic, propet (tennis shoe)    sofft brand, clarks, crocs, aerosoles, naturalizers, SAS, ecco, eliza, sindy franki, riki (dress shoes)    Vionic, burkenstocks, fitflops, propet (sandals)    Nike comfort thong sandals, crocs, propet (house shoes)    Nail Home remedy:  Vicks Vapor rub to nails for easier managability    Occasional soaks for 15-20 mins in luke warm water with 1 cup of listerine and 1 cup of apple cider vinegar are ok You may add several drops of oil of oregano or tea tree oil as well

## 2017-12-06 NOTE — PATIENT INSTRUCTIONS
Hold off for colonoscopy until complete prostate cancer procedure  Continue all current medications  Check labs
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
## 2017-12-07 ENCOUNTER — CLINICAL SUPPORT (OUTPATIENT)
Dept: REHABILITATION | Facility: HOSPITAL | Age: 72
End: 2017-12-07
Attending: PHYSICIAN ASSISTANT
Payer: MEDICARE

## 2017-12-07 ENCOUNTER — TELEPHONE (OUTPATIENT)
Dept: ORTHOPEDICS | Facility: CLINIC | Age: 72
End: 2017-12-07

## 2017-12-07 DIAGNOSIS — Z74.09 IMPAIRED MOBILITY: ICD-10-CM

## 2017-12-07 DIAGNOSIS — R26.89 BALANCE PROBLEMS: ICD-10-CM

## 2017-12-07 DIAGNOSIS — M67.01 HEEL CORD TIGHTNESS, RIGHT: ICD-10-CM

## 2017-12-07 DIAGNOSIS — S82.202F TYPE III OPEN FRACTURE OF LEFT TIBIA AND FIBULA WITH ROUTINE HEALING, SUBSEQUENT ENCOUNTER: Primary | ICD-10-CM

## 2017-12-07 DIAGNOSIS — M25.60 RESTRICTION OF JOINT MOTION: ICD-10-CM

## 2017-12-07 DIAGNOSIS — M25.673 DECREASED RANGE OF MOTION OF ANKLE: ICD-10-CM

## 2017-12-07 DIAGNOSIS — S82.402F TYPE III OPEN FRACTURE OF LEFT TIBIA AND FIBULA WITH ROUTINE HEALING, SUBSEQUENT ENCOUNTER: Primary | ICD-10-CM

## 2017-12-07 DIAGNOSIS — R29.898 RIGHT LEG WEAKNESS: ICD-10-CM

## 2017-12-07 PROCEDURE — 97110 THERAPEUTIC EXERCISES: CPT | Mod: PN | Performed by: PHYSICAL THERAPIST

## 2017-12-07 NOTE — TELEPHONE ENCOUNTER
----- Message from Hollie Ellis sent at 12/7/2017  3:18 PM CST -----  Contact: wife/Bri Gregory called stating that they still have heard from the company that handles the bone stimulator for pt's left ankle. Pt's wife is requesting a call back and could be reached [phone]

## 2017-12-07 NOTE — TELEPHONE ENCOUNTER
Notified pt wife; regarding bone stimulator. [facility] have been notified about pt bone stimulator. Patient wife states verbal understanding and has no further questions.

## 2017-12-07 NOTE — PROGRESS NOTES
"                                          Physical Therapy Daily Note           Name: Janusz Montgomery Jr.  Clinic Number: [id number]  Date of Treatment: 12/07/2017   Diagnosis:   Encounter Diagnoses   Name Primary?    Type III open fracture of left tibia and fibula with routine healing, subsequent encounter Yes    Limited passive range of motion on supination of midtarsal joint     Decreased range of motion of ankle     Impaired mobility     Heel cord tightness, right     Balance problems     Restriction of joint motion     Right leg weakness        Time in: 1510  Time Out:1615  Total Treatment Time:  65     VISITS / PURDY: 8/20 - 12/31/207  BOLD=INDICATES ACTIVITIES PERFORMED.      Subjective  Patient states the foot and ankle are ok today. Going to see MD next week.   Pain level - 0/10 reported.     Objective    Treatment: Patient  was instructed in and performed therapeutic exercises to develop strength, ROM, flexibility, balance and joint mobility. Patient performed therapeutic exercises consisting of the following      Leg press   Recumbent bike  Upright bike   UE ergometer  Treadmill   Elliptical       THERAPEUTIC EXERCISE 1-1 PT = 30'   SUPINE   -heel cord stretch 5'   -DF/PF x50 knee flexed at 30   -inver/ever x50   -circles x40 ea  -ABCs   -posterioir tib and peroneal stretch 30" x 4 ea. W/strap     SIDELYING  -eversion / inversion x30    PRONE    STANDING.  -gastroc stretch 2'  -soleus stretch 2'   -Toe stretch 2'  -heel raises x20    SITTING  -toe curls x20  -toes stretch 1' x 2   -prostretch 2'  -inversion x20  -DF knee flexed / knee extended x20ea  -anterior tib stretch 30" x 4 with DF / PF x15   +soleus stretch   -self stretch ( ant tib, peroneal, post tib ) 10" x 12 ea       MANUAL THERAPY: manual mobs at the  sub talar joint and tibiotalar joint supine. Mulligan Belt fot TT mobs. Mobs for the mid tarsal joints. 1-1 = PT 5'   MODALITY  DIRECT EDUCATION:    Patient was issued HEP      "   Assessment  Completed exercise routine. He did not encounter increased pain during the routine. Still with JMD / MARA of the ankle and subtalar joints. Passive eversion seems improved.    Medical Necessity is demonstrated by:   Unable/ limited ability  to participate in daily activities  Limited mobility, weight bearing status, diminished neuromuscular response and weakness limits function of effected part for some activities, Requires skilled supervision to complete and progress treatment interventions and HEP.  Pt demo good understanding of the education provided. Patient demonstrated good return demonstration of activities.Patient's tolerance to treatment was good. Patient will continue to benefit from skilled PTintervention.Patient is making progress towards established goals.  New/Revised goals: no  Continue with established Plan of Care towards PT goals.   CMS Impairment/Limitation/Restriction for FOTO Lower Leg (w/o Knee) Survey  Status Limitation G-Code CMS Severity Modifier  Intake 30% 70%  Predicted 42% 58% Goal Status+ CK - At least 40 percent but less than 60 percent  12/1/2017 41% 59% Current Status CK - At least 40 percent but less than 60 percent  D/C Status CK **only report if this is discharge survey  +Based on FOTO predicted change score  * Mean, Risk Adjusted, Intake Composite FS measures from FOTO aggregate database.  ** As indicated by the ICF assignments to the survey items in the FOTO survey used.  [facility] [place]  FUNCTIONAL STATUS SUMMARY (11/14/2017)  Patient: Janusz Montgomery JrNgoc HATFIELD ([id number]) Primary Body Part: Lower Leg (w/o Knee) Initial DOS: 11/14/2017  Produced and © [id number] by  [employer]    PLAN     Certification Period: 11/14/2017 to 2/14/2018  Recommended Treatment Plan: 2 times per week for 12 weeks: Manual Therapy, Neuromuscular Re-ed, Patient Education and Therapeutic Exercise  Other Recommendations: modalities PRN   Pt may be  seen by PTA as part of the rehabilitation team.      Therapist: Yusuf Farooq, PT

## 2017-12-08 ENCOUNTER — NURSE TRIAGE (OUTPATIENT)
Dept: ADMINISTRATIVE | Facility: CLINIC | Age: 72
End: 2017-12-08

## 2017-12-08 NOTE — TELEPHONE ENCOUNTER
"Pt called re carmen horse in thighs, forearm    Reason for Disposition   Recent illness requiring prolonged bedrest (i.e., immobilization)    Answer Assessment - Initial Assessment Questions  1. ONSET: "When did the pain start?"      Took two diuretics last pm, tried eating banana q am, took potassium pills. Hx dehydration  2. LOCATION: "Where is the pain located?"      bilat thighs , fingers  3. PAIN: "How bad is the pain?"    (Scale 1-10; or mild, moderate, severe)    -  MILD (1-3): doesn't interfere with normal activities     -  MODERATE (4-7): interferes with normal activities (e.g., work or school) or awakens from sleep, limping     -  SEVERE (8-10): excruciating pain, unable to do any normal activities, unable to walk     Able to walk down. Pain level 8   4. WORK OR EXERCISE: "Has there been any recent work or exercise that involved this part of the body?"      No   5. CAUSE: "What do you think is causing the leg pain?"     Spasm, going to PT for left ankle , denies SOB, swelling of ankles , recovering from broken leg from last year - using walker and wWC  6. OTHER SYMPTOMS: "Do you have any other symptoms?" (e.g., chest pain, back pain, breathing difficulty, swelling, rash, fever, numbness, weakness)     No    Protocols used: ST LEG PAIN-A-AH  pharm: on file urgent message sent to PCP. Call back if no response form MD within 30 mins. Call back with questions     "

## 2017-12-08 NOTE — TELEPHONE ENCOUNTER
Pt states that he has not been taking fluid pills but he took 2 last night to reduce fluid. Pt states that he has severe cramping. Advised pt to take otc potassium pill. Pt states that he has taken otc meds. Advised pt to try heating pad and hot water to cramping area. Verbal understanding.

## 2017-12-11 ENCOUNTER — PATIENT MESSAGE (OUTPATIENT)
Dept: ORTHOPEDICS | Facility: CLINIC | Age: 72
End: 2017-12-11

## 2017-12-11 DIAGNOSIS — F41.8 MIXED ANXIETY AND DEPRESSIVE DISORDER: ICD-10-CM

## 2017-12-11 DIAGNOSIS — R35.0 URINARY FREQUENCY: ICD-10-CM

## 2017-12-11 DIAGNOSIS — B00.52 HERPES SIMPLEX KERATITIS: ICD-10-CM

## 2017-12-11 RX ORDER — TAMSULOSIN HYDROCHLORIDE 0.4 MG/1
0.4 CAPSULE ORAL DAILY
Qty: 90 CAPSULE | Refills: 0 | Status: SHIPPED | OUTPATIENT
Start: 2017-12-11 | End: 2018-03-27 | Stop reason: SDUPTHER

## 2017-12-11 RX ORDER — RAMIPRIL 5 MG/1
5 CAPSULE ORAL DAILY
Qty: 90 CAPSULE | Refills: 0 | Status: SHIPPED | OUTPATIENT
Start: 2017-12-11 | End: 2018-02-07 | Stop reason: SDUPTHER

## 2017-12-11 RX ORDER — VENLAFAXINE 75 MG/1
150 TABLET ORAL 2 TIMES DAILY
Qty: 360 TABLET | Refills: 0 | Status: SHIPPED | OUTPATIENT
Start: 2017-12-11 | End: 2018-01-12 | Stop reason: SDUPTHER

## 2017-12-11 NOTE — TELEPHONE ENCOUNTER
----- Message from Ursula Zapata sent at 12/11/2017 11:51 AM CST -----  Patient wants to discuss medication. Please call at [phone] Thank you!

## 2017-12-11 NOTE — TELEPHONE ENCOUNTER
Patient is requesting a refill on Ramipril,Effexor,Flomax and Omeprazole he states his GERD has flared please advise.

## 2017-12-12 ENCOUNTER — OFFICE VISIT (OUTPATIENT)
Dept: CARDIOLOGY | Facility: CLINIC | Age: 72
End: 2017-12-12
Payer: MEDICARE

## 2017-12-12 ENCOUNTER — CLINICAL SUPPORT (OUTPATIENT)
Dept: REHABILITATION | Facility: HOSPITAL | Age: 72
End: 2017-12-12
Attending: PHYSICIAN ASSISTANT
Payer: MEDICARE

## 2017-12-12 ENCOUNTER — LAB VISIT (OUTPATIENT)
Dept: LAB | Facility: HOSPITAL | Age: 72
End: 2017-12-12
Payer: MEDICARE

## 2017-12-12 VITALS
WEIGHT: 315 LBS | OXYGEN SATURATION: 100 % | DIASTOLIC BLOOD PRESSURE: 82 MMHG | SYSTOLIC BLOOD PRESSURE: 154 MMHG | BODY MASS INDEX: 46.54 KG/M2 | HEART RATE: 80 BPM

## 2017-12-12 DIAGNOSIS — N18.30 STAGE 3 CHRONIC KIDNEY DISEASE: ICD-10-CM

## 2017-12-12 DIAGNOSIS — R29.898 RIGHT LEG WEAKNESS: ICD-10-CM

## 2017-12-12 DIAGNOSIS — S82.402F TYPE III OPEN FRACTURE OF LEFT TIBIA AND FIBULA WITH ROUTINE HEALING, SUBSEQUENT ENCOUNTER: Primary | ICD-10-CM

## 2017-12-12 DIAGNOSIS — R06.02 SOB (SHORTNESS OF BREATH): ICD-10-CM

## 2017-12-12 DIAGNOSIS — S82.202F TYPE III OPEN FRACTURE OF LEFT TIBIA AND FIBULA WITH ROUTINE HEALING, SUBSEQUENT ENCOUNTER: Primary | ICD-10-CM

## 2017-12-12 DIAGNOSIS — R26.89 BALANCE PROBLEMS: ICD-10-CM

## 2017-12-12 DIAGNOSIS — E78.5 HYPERLIPIDEMIA, UNSPECIFIED HYPERLIPIDEMIA TYPE: ICD-10-CM

## 2017-12-12 DIAGNOSIS — M67.01 HEEL CORD TIGHTNESS, RIGHT: ICD-10-CM

## 2017-12-12 DIAGNOSIS — I10 ESSENTIAL HYPERTENSION: ICD-10-CM

## 2017-12-12 DIAGNOSIS — G47.33 OBSTRUCTIVE SLEEP APNEA ON CPAP: ICD-10-CM

## 2017-12-12 DIAGNOSIS — Z74.09 IMPAIRED MOBILITY: ICD-10-CM

## 2017-12-12 DIAGNOSIS — M25.673 DECREASED RANGE OF MOTION OF ANKLE: ICD-10-CM

## 2017-12-12 DIAGNOSIS — L97.529 NON-PRESSURE CHRONIC ULCER OF OTHER PART OF LEFT FOOT WITH UNSPECIFIED SEVERITY: ICD-10-CM

## 2017-12-12 DIAGNOSIS — Z95.818 STATUS POST PLACEMENT OF IMPLANTABLE LOOP RECORDER: ICD-10-CM

## 2017-12-12 DIAGNOSIS — E66.01 MORBID OBESITY WITH BMI OF 40.0-44.9, ADULT: ICD-10-CM

## 2017-12-12 DIAGNOSIS — I48.0 PAF (PAROXYSMAL ATRIAL FIBRILLATION): Primary | ICD-10-CM

## 2017-12-12 DIAGNOSIS — M25.60 RESTRICTION OF JOINT MOTION: ICD-10-CM

## 2017-12-12 DIAGNOSIS — I25.10 CORONARY ARTERY DISEASE INVOLVING NATIVE CORONARY ARTERY OF NATIVE HEART WITHOUT ANGINA PECTORIS: ICD-10-CM

## 2017-12-12 DIAGNOSIS — D63.1 ANEMIA OF CHRONIC RENAL FAILURE, UNSPECIFIED STAGE: ICD-10-CM

## 2017-12-12 DIAGNOSIS — N18.9 ANEMIA OF CHRONIC RENAL FAILURE, UNSPECIFIED STAGE: ICD-10-CM

## 2017-12-12 LAB
25(OH)D3+25(OH)D2 SERPL-MCNC: 30 NG/ML
ALBUMIN SERPL BCP-MCNC: 3.6 G/DL
ANION GAP SERPL CALC-SCNC: 9 MMOL/L
BUN SERPL-MCNC: 19 MG/DL
CALCIUM SERPL-MCNC: 9.4 MG/DL
CHLORIDE SERPL-SCNC: 107 MMOL/L
CO2 SERPL-SCNC: 26 MMOL/L
CREAT SERPL-MCNC: 1.2 MG/DL
EST. GFR  (AFRICAN AMERICAN): >60 ML/MIN/1.73 M^2
EST. GFR  (NON AFRICAN AMERICAN): >60 ML/MIN/1.73 M^2
FERRITIN SERPL-MCNC: 63 NG/ML
GLUCOSE SERPL-MCNC: 111 MG/DL
IRON SERPL-MCNC: 81 UG/DL
PHOSPHATE SERPL-MCNC: 2.6 MG/DL
POTASSIUM SERPL-SCNC: 4.8 MMOL/L
PTH-INTACT SERPL-MCNC: 143 PG/ML
SATURATED IRON: 23 %
SODIUM SERPL-SCNC: 142 MMOL/L
TOTAL IRON BINDING CAPACITY: 358 UG/DL
TRANSFERRIN SERPL-MCNC: 242 MG/DL

## 2017-12-12 PROCEDURE — 93000 ELECTROCARDIOGRAM COMPLETE: CPT | Mod: S$GLB,,, | Performed by: INTERNAL MEDICINE

## 2017-12-12 PROCEDURE — 82728 ASSAY OF FERRITIN: CPT

## 2017-12-12 PROCEDURE — 84100 ASSAY OF PHOSPHORUS: CPT

## 2017-12-12 PROCEDURE — 83540 ASSAY OF IRON: CPT

## 2017-12-12 PROCEDURE — 99999 PR PBB SHADOW E&M-EST. PATIENT-LVL IV: CPT | Mod: PBBFAC,,, | Performed by: INTERNAL MEDICINE

## 2017-12-12 PROCEDURE — 36415 COLL VENOUS BLD VENIPUNCTURE: CPT | Mod: PO

## 2017-12-12 PROCEDURE — 83970 ASSAY OF PARATHORMONE: CPT

## 2017-12-12 PROCEDURE — 99499 UNLISTED E&M SERVICE: CPT | Mod: S$GLB,,, | Performed by: INTERNAL MEDICINE

## 2017-12-12 PROCEDURE — 82306 VITAMIN D 25 HYDROXY: CPT

## 2017-12-12 PROCEDURE — 97110 THERAPEUTIC EXERCISES: CPT | Mod: PN | Performed by: PHYSICAL THERAPIST

## 2017-12-12 PROCEDURE — 99204 OFFICE O/P NEW MOD 45 MIN: CPT | Mod: S$GLB,,, | Performed by: INTERNAL MEDICINE

## 2017-12-12 NOTE — PROGRESS NOTES
Subjective:    Patient ID:  Janusz Montgomery Jr. is a 72 y.o. male who presents for evaluation of Consult (changing cardiologist )      HPI  Here to establish care for history of CAD and paroxysmal atrial fibrillation.  He previously seen cardiology and electrophysiology at [facility].  He is a loop recorder implanted that shows basically normal sinus rhythm on multiple interrogations.  He only is on aspirin for OAC has been stable from her arrhythmia standpoint.  He denies any chest pain and has a history of prior PCI.  He was originally scheduled for a stress test last year but went into the hospital with several issues including lower extremity wounds which she is now receiving rehabilitation.  Mainly complains of shortness of breath on heavier exertion but relieved with rest.  He notices more progressive symptoms lately getting short winded walking from the parking lot to the office.  Usually everything calms down after he rests.  He denies any chest pain or palpitations.  He's express no PND, orthopnea or lower edema.  He's not had any dizziness, presyncope or syncope.    Review of Systems   Constitution: Negative.   HENT: Negative.    Eyes: Negative.    Cardiovascular: Positive for dyspnea on exertion. Negative for chest pain, irregular heartbeat, leg swelling, near-syncope, orthopnea, palpitations, paroxysmal nocturnal dyspnea and syncope.   Respiratory: Positive for shortness of breath.    Skin: Negative.    Musculoskeletal: Negative.    Gastrointestinal: Negative for abdominal pain, constipation and diarrhea.   Genitourinary: Negative for dysuria.   Neurological: Negative for dizziness.   Psychiatric/Behavioral: Negative.      Past Medical History:   Diagnosis Date    NAT (acute kidney injury) 3/19/2017    ALLERGIC RHINITIS     Anemia     Anxiety     Basal cell carcinoma     forehead    Basal cell carcinoma     left eylid    Basal cell carcinoma 08/18/2014    left prox cheek    Basal cell carcinoma  "9/13/13    Right anterior shoulder    Basal cell carcinoma     Chronic rhinitis 5/3/2013    Chronic rhinitis 5/3/2013    Coronary artery disease involving native coronary artery of native heart without angina pectoris s/p RCA stent     Cortical cataract of both eyes 3/18/2016    Depression     Erectile dysfunction 3/24/2014    Erectile dysfunction 3/24/2014    Essential hypertension     GERD (gastroesophageal reflux disease) 7/25/2012    Gout, arthritis     Grade III open fracture of left tibia and fibula s/p ex-fix on 7/1/16 and ORIF of left tibia on 7/15 7/6/2016    H/O: iritis     Helicobacter pylori (H. pylori) infection     Treated    Herpes simplex keratoconjunctivitis 9/30/2015    - on acyclovir - followed by opthalmology, Dr. Uribe     Herpes simplex keratoconjunctivitis 9/30/2015    - on acyclovir - followed by opthalmology, Dr. Uribe     Hyperkalemia 2/28/2017    Hyperlipidemia     Hypogonadism male     Hypogonadism male     Mixed anxiety and depressive disorder     Morbid obesity     Obstructive sleep apnea on CPAP     CPAP    Osteoarthritis of left knee 7/25/2012    Paroxysmal atrial fibrillation 7/6/2016    Primary osteoarthritis of left knee 7/25/2012    Prominent aorta 1/25/2016    "RESULTS: THE HEART IS MILDLY ENLARGED WITH A SLIGHTLY PROMINENT AORTA" - Xray Chest PA & Lateral 12-     Prostate cancer 2/15/2016    - followed by urology, Dr. Young     Prostate cancer 2/15/2016    - followed by urology, Dr. 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 Conteh    CATARACT EXTRACTION W/  INTRAOCULAR LENS IMPLANT Left 4/12/2016        EXTERNAL FIXATION TIBIAL FRACTURE Left 07/01/2016    ORIF TIBIA FRACTURE Left 07/15/2016    Squamous cell cancer removal x3 with Mohs surgery      TONSILLECTOMY      TOTAL KNEE ARTHROPLASTY  10/2012    trus/bx       Social History   Substance Use Topics    Smoking status: Never Smoker    Smokeless tobacco: Never Used    Alcohol use Yes      Comment: occasionally     Family History   Problem Relation Age of Onset    Skin cancer Father     Lung cancer Father     Cancer Father      smoker,     Alzheimer's disease Mother     Hypertension Mother     Cancer Sister      colon, lung cancer     Cancer Brother      skin cancer, polypectomy     Peripheral vascular disease      Melanoma Neg Hx     Psoriasis Neg Hx     Lupus Neg Hx     Eczema Neg Hx     Amblyopia Neg Hx     Blindness Neg Hx     Cataracts Neg Hx     Diabetes Neg Hx     Glaucoma Neg Hx     Macular degeneration Neg Hx     Retinal detachment Neg Hx     Strabismus Neg Hx     Stroke Neg Hx     Thyroid disease Neg Hx     Acne Neg Hx         Objective:    Physical Exam   Constitutional: He is oriented to person, place, and time. He appears well-developed and well-nourished. No distress.   HENT:   Head: Normocephalic and atraumatic.   Eyes: Conjunctivae and EOM are normal. Pupils are equal, round, and reactive to light.   Neck: Normal range of motion. Neck supple. No thyromegaly present.   Cardiovascular: Normal rate, regular rhythm and normal heart sounds.    No murmur heard.  Pulmonary/Chest: Effort normal and breath sounds normal. No respiratory distress. He has no wheezes. He has no rales. He exhibits no tenderness.   Abdominal: Soft. Bowel sounds are normal.   Musculoskeletal: He exhibits no edema.   Neurological: He is alert and oriented to person, place, and time.   Skin: Skin is warm and dry.    Psychiatric: He has a normal mood and affect. His behavior is normal.         ekg normal sinus rhythm    Echo: 2-17  CONCLUSIONS     1 - Normal left ventricular systolic function (EF 55-60%).     2 - Normal left ventricular diastolic function.     3 - Normal right ventricular systolic function .     Assessment:       1. PAF (paroxysmal atrial fibrillation)    2. Essential hypertension    3. Coronary artery disease involving native coronary artery of native heart without angina pectoris s/p RCA stent    4. Obstructive sleep apnea on CPAP    5. Non-pressure chronic ulcer of other part of left foot with unspecified severity    6. Morbid obesity with BMI of 40.0-44.9, adult    7. Hyperlipidemia, unspecified hyperlipidemia type    8. Status post placement of implantable loop recorder         Plan:       -Continue med therapy  -Plan for ischemic workup with current symptoms and history of CAD with prior PCI  *Cannot exercise with lower extremity wounds currently in rehabilitation  -Stable normal sinus rhythm today and on loop recorder interrogation  -Currently only on aspirin therapy for OAC  -Follow-up with rehabilitation and wound care  -Consider PAD screen at next visit    Return to clinic in one month with testing ASAP

## 2017-12-12 NOTE — PROGRESS NOTES
"                                          Physical Therapy Daily Note           Name: Janusz Montgomery Jr.  Clinic Number: [id number]  Date of Treatment: 12/12/2017   Diagnosis:   Encounter Diagnoses   Name Primary?    Type III open fracture of left tibia and fibula with routine healing, subsequent encounter Yes    Limited passive range of motion on supination of midtarsal joint     Decreased range of motion of ankle     Impaired mobility     Balance problems     Restriction of joint motion     Heel cord tightness, right     Right leg weakness        Time in: 1510  Time Out:1620  Total Treatment Time:  70  VISITS / PURDY: 9/20 - 12/31/207  BOLD=INDICATES ACTIVITIES PERFORMED.      Subjective  Patient states there is some pain in the ankle when I stretch it but otherwise there is no pain when I walk.   Pain level - 0/10 reported.     Objective    Treatment: Patient  was instructed in and performed therapeutic exercises to develop strength, ROM, flexibility, balance and joint mobility. Patient performed therapeutic exercises consisting of the following      Leg press   Recumbent bike  Upright bike   UE ergometer  Treadmill   Elliptical 1-2' - endurance compromised.       THERAPEUTIC EXERCISE 1-1 PT = 30'   SUPINE   -heel cord stretch 5'   -DF/PF x50 knee flexed at 30   -inver/ever x50   -circles x40 ea  -ABCs   -posterioir tib and peroneal stretch 30" x 4 ea. W/strap     SIDELYING  -eversion / inversion x30    PRONE    STANDING.  -gastroc stretch 2'  -soleus stretch 2'   -Toe stretch 2'  -heel raises x20    SITTING  -toe curls x20  -toes stretch 1' x 2   -prostretch 2'  -inversion x20  -DF knee flexed / knee extended x20ea  -anterior tib stretch 30" x 4 with DF / PF x15   +soleus stretch   -self stretch ( ant tib, peroneal, post tib ) 10" x 12 ea       MANUAL THERAPY: manual mobs at the  sub talar joint and tibiotalar joint supine. Mulligan Belt fot TT mobs. Mobs for the mid tarsal joints. 1-1 = PT 5' "   MODALITY  DIRECT EDUCATION:    Patient was issued HEP        Assessment  Performed all activities as noted. He did not demonstrate any compromise from pain. Mobility remains restricted but improved.  Progress with weight bearing and CC activities w/o the boot.    Medical Necessity is demonstrated by:   Unable/ limited ability  to participate in daily activities  Limited mobility, weight bearing status, diminished neuromuscular response and weakness limits function of effected part for some activities, Requires skilled supervision to complete and progress treatment interventions and HEP.  Pt demo good understanding of the education provided. Patient demonstrated good return demonstration of activities.Patient's tolerance to treatment was good. Patient will continue to benefit from skilled PTintervention.Patient is making progress towards established goals.  New/Revised goals: no  Continue with established Plan of Care towards PT goals.   CMS Impairment/Limitation/Restriction for FOTO Lower Leg (w/o Knee) Survey  Status Limitation G-Code CMS Severity Modifier  Intake 30% 70%  Predicted 42% 58% Goal Status+ CK - At least 40 percent but less than 60 percent  12/1/2017 41% 59% Current Status CK - At least 40 percent but less than 60 percent  D/C Status CK **only report if this is discharge survey  +Based on FOTO predicted change score  * Mean, Risk Adjusted, Intake Composite FS measures from FOTO aggregate database.  ** As indicated by the ICF assignments to the survey items in the FOTO survey used.  [facility] - [facility] [place]  FUNCTIONAL STATUS SUMMARY (11/14/2017)  Patient: Janusz Montgomery JrNgoc HATFIELD ([id number]) Primary Body Part: Lower Leg (w/o Knee) Initial DOS: 11/14/2017  Produced and © [id number] by  [employer]    PLAN     Certification Period: 11/14/2017 to 2/14/2018  Recommended Treatment Plan: 2 times per week for 12 weeks: Manual Therapy, Neuromuscular Re-ed, Patient Education  and Therapeutic Exercise  Other Recommendations: modalities PRN   Pt may be seen by PTA as part of the rehabilitation team.      Therapist: Yusuf Farooq PT

## 2017-12-12 NOTE — LETTER
December 12, 2017      Prudence Gonzalez, FNP-C  [address] [facility] [address]           [facility]  [address] [facility] [address] [place] [address]  Phone: [phone]          Patient: Janusz Montgomery Jr.   MR Number: [id number]   YOB: 1945   Date of Visit: 12/12/2017       Dear Prudence Gonzalez:    Thank you for referring Janusz Montgomery to me for evaluation. Attached you will find relevant portions of my assessment and plan of care.    If you have questions, please do not hesitate to call me. I look forward to following Janusz Montgomery along with you.    Sincerely,    Miguel Smith MD    Enclosure  CC:  No Recipients    If you would like to receive this communication electronically, please contact [email or web address] or [phone] to request more information on [place] Link access.    For providers and/or their staff who would like to refer a patient to [facility], please contact us through our one-stop-shop provider referral line, [facility] , at [phone].    If you feel you have received this communication in error or would no longer like to receive these types of communications, please e-mail [email or web address]

## 2017-12-13 ENCOUNTER — TELEPHONE (OUTPATIENT)
Dept: FAMILY MEDICINE | Facility: CLINIC | Age: 72
End: 2017-12-13

## 2017-12-13 ENCOUNTER — OFFICE VISIT (OUTPATIENT)
Dept: ORTHOPEDICS | Facility: CLINIC | Age: 72
End: 2017-12-13
Payer: MEDICARE

## 2017-12-13 ENCOUNTER — HOSPITAL ENCOUNTER (OUTPATIENT)
Dept: RADIOLOGY | Facility: HOSPITAL | Age: 72
Discharge: HOME | End: 2017-12-13
Attending: PHYSICIAN ASSISTANT
Payer: MEDICARE

## 2017-12-13 DIAGNOSIS — S82.402F: Primary | ICD-10-CM

## 2017-12-13 DIAGNOSIS — S82.202F: Primary | ICD-10-CM

## 2017-12-13 DIAGNOSIS — T14.8XXA FRACTURE: ICD-10-CM

## 2017-12-13 PROCEDURE — 73610 X-RAY EXAM OF ANKLE: CPT | Mod: TC,LT

## 2017-12-13 PROCEDURE — 73610 X-RAY EXAM OF ANKLE: CPT | Mod: 26,LT,, | Performed by: RADIOLOGY

## 2017-12-13 PROCEDURE — 99999 PR PBB SHADOW E&M-EST. PATIENT-LVL III: CPT | Mod: PBBFAC,,, | Performed by: PHYSICIAN ASSISTANT

## 2017-12-13 PROCEDURE — 99499 UNLISTED E&M SERVICE: CPT | Mod: S$GLB,,, | Performed by: PHYSICIAN ASSISTANT

## 2017-12-13 PROCEDURE — 99213 OFFICE O/P EST LOW 20 MIN: CPT | Mod: S$GLB,,, | Performed by: PHYSICIAN ASSISTANT

## 2017-12-13 NOTE — TELEPHONE ENCOUNTER
I left a message with the patient's wife to have patient return a call tomorrow to the office to discuss labs. Patient's wife verbalized understanding of above.

## 2017-12-14 ENCOUNTER — TELEPHONE (OUTPATIENT)
Dept: FAMILY MEDICINE | Facility: CLINIC | Age: 72
End: 2017-12-14

## 2017-12-14 ENCOUNTER — CLINICAL SUPPORT (OUTPATIENT)
Dept: REHABILITATION | Facility: HOSPITAL | Age: 72
End: 2017-12-14
Attending: PHYSICIAN ASSISTANT
Payer: MEDICARE

## 2017-12-14 DIAGNOSIS — R26.89 BALANCE PROBLEMS: ICD-10-CM

## 2017-12-14 DIAGNOSIS — M67.01 HEEL CORD TIGHTNESS, RIGHT: ICD-10-CM

## 2017-12-14 DIAGNOSIS — S82.202F TYPE III OPEN FRACTURE OF LEFT TIBIA AND FIBULA WITH ROUTINE HEALING, SUBSEQUENT ENCOUNTER: Primary | ICD-10-CM

## 2017-12-14 DIAGNOSIS — M25.673 DECREASED RANGE OF MOTION OF ANKLE: ICD-10-CM

## 2017-12-14 DIAGNOSIS — Z74.09 IMPAIRED MOBILITY: ICD-10-CM

## 2017-12-14 DIAGNOSIS — R29.898 RIGHT LEG WEAKNESS: ICD-10-CM

## 2017-12-14 DIAGNOSIS — S82.402F TYPE III OPEN FRACTURE OF LEFT TIBIA AND FIBULA WITH ROUTINE HEALING, SUBSEQUENT ENCOUNTER: Primary | ICD-10-CM

## 2017-12-14 DIAGNOSIS — M25.60 RESTRICTION OF JOINT MOTION: ICD-10-CM

## 2017-12-14 PROCEDURE — 97110 THERAPEUTIC EXERCISES: CPT | Mod: PN | Performed by: PHYSICAL THERAPIST

## 2017-12-14 NOTE — PROGRESS NOTES
"                                          Physical Therapy Daily Note           Name: Janusz Montgomery Jr.  Clinic Number: [id number]  Date of Treatment: 12/14/2017   Diagnosis:   Encounter Diagnoses   Name Primary?    Type III open fracture of left tibia and fibula with routine healing, subsequent encounter Yes    Limited passive range of motion on supination of midtarsal joint     Balance problems     Impaired mobility     Heel cord tightness, right     Right leg weakness     Restriction of joint motion     Decreased range of motion of ankle        Time in: 1525  Time Out:1610  Total Treatment Time: 45   VISITS / PURDY: 10/20 - 12/31/207  BOLD=INDICATES ACTIVITIES PERFORMED.      Subjective  Patient states the ankle / foot are swollen but no pain.   Pain level - 0/10 reported.     Objective    Treatment: Patient  was instructed in and performed therapeutic exercises to develop strength, ROM, flexibility, balance and joint mobility. Patient performed therapeutic exercises consisting of the following      Leg press   Recumbent bike  Upright bike   UE ergometer  Treadmill   Elliptical 1-2' - endurance compromised.       THERAPEUTIC EXERCISE 1-1 PT = 30'   SUPINE   -heel cord stretch 5'   -DF/PF x50 knee flexed at 30   -inver/ever x50   -circles x40 ea  -ABCs   -posterioir tib and peroneal stretch 30" x 4 ea. W/strap     SIDELYING  -eversion / inversion x30    PRONE    STANDING.  -gastroc stretch 2'  -soleus stretch 2'   -Toe stretch 2'  -heel raises x20    SITTING  -toe curls x20  -toes stretch 1' x 2   -prostretch 2'  -inversion x20  -DF knee flexed / knee extended x20ea  -anterior tib stretch 30" x 4 with DF / PF x15   +soleus stretch   -self stretch ( ant tib, peroneal, post tib ) 10" x 12 ea       MANUAL THERAPY: manual mobs at the  sub talar joint and tibiotalar joint supine. Mulligan Belt fot TT mobs. Mobs for the mid tarsal joints. 1-1 = PT 5'   MODALITY  DIRECT EDUCATION:    Patient was issued HEP      "   Assessment  Routine performed. Patient completed activities listed. Not able to progress due to time of arrival. He did not encounter pain. May benefit from taping.   Medical Necessity is demonstrated by:   Unable/ limited ability  to participate in daily activities  Limited mobility, weight bearing status, diminished neuromuscular response and weakness limits function of effected part for some activities, Requires skilled supervision to complete and progress treatment interventions and HEP.  Pt demo good understanding of the education provided. Patient demonstrated good return demonstration of activities.Patient's tolerance to treatment was good. Patient will continue to benefit from skilled PTintervention.Patient is making progress towards established goals.  New/Revised goals: no  Continue with established Plan of Care towards PT goals.   CMS Impairment/Limitation/Restriction for FOTO Lower Leg (w/o Knee) Survey  Status Limitation G-Code CMS Severity Modifier  Intake 30% 70%  Predicted 42% 58% Goal Status+ CK - At least 40 percent but less than 60 percent  12/1/2017 41% 59% Current Status CK - At least 40 percent but less than 60 percent  D/C Status CK **only report if this is discharge survey  +Based on FOTO predicted change score  * Mean, Risk Adjusted, Intake Composite FS measures from FOTO aggregate database.  ** As indicated by the ICF assignments to the survey items in the FOTO survey used.  [facility] - [facility] [place]  FUNCTIONAL STATUS SUMMARY (11/14/2017)  Patient: Ulises Janusz Jr. HATFIELD ([id number]) Primary Body Part: Lower Leg (w/o Knee) Initial DOS: 11/14/2017  Produced and © [id number] by  [employer]    PLAN     Certification Period: 11/14/2017 to 2/14/2018  Recommended Treatment Plan: 2 times per week for 12 weeks: Manual Therapy, Neuromuscular Re-ed, Patient Education and Therapeutic Exercise  Other Recommendations: modalities PRN   Pt may be seen by PTA as part  of the rehabilitation team.      Therapist: Yusuf Farooq PT

## 2017-12-14 NOTE — TELEPHONE ENCOUNTER
I spoke with the patient and explained that his labs were for the most part normal. His anemia had improved. Patient verbalized understanding of above.

## 2017-12-14 NOTE — PROGRESS NOTES
HPI:  Mr. 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  He is doing well and has no pain.  His heel ulcer is being followed in wound care clinic and continues heal.  He was on antibiotics for an extended period with the ulcer to avoid having issues with seeding the plate.  He is off of them now and has not had any fever chill increased warmth.  He continued to advanced to progressive weightbearing. Patient stated that he has been at weight bearing as tolerated in the CAM boot and is doing ok with this. He stated that he is attending formal therapy and they are working mainly on the range of motion of his ankle. He reports that he seems to be doing well. He is using a walker to assist with ambulation. He reports that around the house he will take some steps without the CAM boot. He does have issues with vascular in his left leg.       ROS:  Patient denies constitutional symptoms, cardiac symptoms, respiratory symptoms, GI symptoms.  The remainder of the musculoskeletal ROS is included in the HPI.    PE:    AA&O x 4.  NAD  HEENT:  NCAT, sclera nonicteric  Lungs:  Respirations are equal and unlabored.  CV:  2+ bilateral upper and lower extremity pulses.    PE:  Incisions well healed. He does have a small medial wound reports caused by rubbing in the boot, Mild swelling today, but no erythema.  Heel ulcer continues to decrease in size per patient, new scabbing just formed over ulcer area       Rads:  Left ankle AP, lateral, and oblique views. Comparison 10/18/1/17. Healing, comminuted fractures of the distal shafts of the fibula and tibia remain in satisfactory position and alignment. Tibial fracture is internally fixated. Screw holes are noted in other areas. The mid shaft of the fibula has an old fracture. Ankle joint space is mildly narrowed. No hardware failure noted.     A/P:   status post revision fixation with bone grafting of grade III  open left distal tibia fracture. -  At this point, he will continue progressive weight bearing.    -PT, Mathew, Patient is to make appointment himself.  -  He will RTC in 8 weeks with  with x-rays of the left ankle.  Sooner if he has any issues.

## 2017-12-15 ENCOUNTER — HOSPITAL ENCOUNTER (OUTPATIENT)
Dept: RADIOLOGY | Facility: HOSPITAL | Age: 72
Discharge: HOME | End: 2017-12-15
Attending: INTERNAL MEDICINE
Payer: MEDICARE

## 2017-12-15 ENCOUNTER — HOSPITAL ENCOUNTER (OUTPATIENT)
Dept: CARDIOLOGY | Facility: HOSPITAL | Age: 72
Discharge: HOME | End: 2017-12-15
Attending: INTERNAL MEDICINE
Payer: MEDICARE

## 2017-12-15 DIAGNOSIS — I25.10 CORONARY ARTERY DISEASE INVOLVING NATIVE CORONARY ARTERY OF NATIVE HEART WITHOUT ANGINA PECTORIS: ICD-10-CM

## 2017-12-15 LAB — DIASTOLIC DYSFUNCTION: NO

## 2017-12-15 PROCEDURE — 78452 HT MUSCLE IMAGE SPECT MULT: CPT | Mod: 26,,, | Performed by: INTERNAL MEDICINE

## 2017-12-15 PROCEDURE — 93017 CV STRESS TEST TRACING ONLY: CPT

## 2017-12-15 PROCEDURE — A9502 TC99M TETROFOSMIN: HCPCS

## 2017-12-15 PROCEDURE — 78452 HT MUSCLE IMAGE SPECT MULT: CPT | Mod: TC

## 2017-12-15 PROCEDURE — 93018 CV STRESS TEST I&R ONLY: CPT | Mod: ,,, | Performed by: INTERNAL MEDICINE

## 2017-12-15 PROCEDURE — 93016 CV STRESS TEST SUPVJ ONLY: CPT | Mod: ,,, | Performed by: INTERNAL MEDICINE

## 2017-12-15 PROCEDURE — 63600175 PHARM REV CODE 636 W HCPCS

## 2017-12-15 RX ORDER — REGADENOSON 0.08 MG/ML
INJECTION, SOLUTION INTRAVENOUS
Status: DISPENSED
Start: 2017-12-15 | End: 2017-12-15

## 2017-12-19 ENCOUNTER — CLINICAL SUPPORT (OUTPATIENT)
Dept: REHABILITATION | Facility: HOSPITAL | Age: 72
End: 2017-12-19
Attending: PHYSICIAN ASSISTANT
Payer: MEDICARE

## 2017-12-19 ENCOUNTER — TELEPHONE (OUTPATIENT)
Dept: FAMILY MEDICINE | Facility: CLINIC | Age: 72
End: 2017-12-19

## 2017-12-19 DIAGNOSIS — S82.402F TYPE III OPEN FRACTURE OF LEFT TIBIA AND FIBULA WITH ROUTINE HEALING, SUBSEQUENT ENCOUNTER: Primary | ICD-10-CM

## 2017-12-19 DIAGNOSIS — M25.60 RESTRICTION OF JOINT MOTION: ICD-10-CM

## 2017-12-19 DIAGNOSIS — Z74.09 IMPAIRED MOBILITY: ICD-10-CM

## 2017-12-19 DIAGNOSIS — R29.898 RIGHT LEG WEAKNESS: ICD-10-CM

## 2017-12-19 DIAGNOSIS — R26.89 BALANCE PROBLEMS: ICD-10-CM

## 2017-12-19 DIAGNOSIS — S82.202F TYPE III OPEN FRACTURE OF LEFT TIBIA AND FIBULA WITH ROUTINE HEALING, SUBSEQUENT ENCOUNTER: Primary | ICD-10-CM

## 2017-12-19 DIAGNOSIS — Z12.11 SCREENING FOR COLON CANCER: Primary | ICD-10-CM

## 2017-12-19 DIAGNOSIS — M67.01 HEEL CORD TIGHTNESS, RIGHT: ICD-10-CM

## 2017-12-19 PROCEDURE — 97110 THERAPEUTIC EXERCISES: CPT | Mod: PN | Performed by: PHYSICAL THERAPIST

## 2017-12-19 NOTE — PROGRESS NOTES
"                                          Physical Therapy Daily Note           Name: Janusz Montgomery Jr.  Clinic Number: [id number]  Date of Treatment: 12/19/2017   Diagnosis:   Encounter Diagnoses   Name Primary?    Type III open fracture of left tibia and fibula with routine healing, subsequent encounter Yes    Limited passive range of motion on supination of midtarsal joint     Impaired mobility     Balance problems     Heel cord tightness, right     Right leg weakness     Restriction of joint motion        Time in: 1515  Time Out:1610  Total Treatment Time: 60  VISITS / PURDY: 11/20 - 12/31/207  BOLD=INDICATES ACTIVITIES PERFORMED.      Subjective  Patient states that he still has swelling in the ankle.   Pain level - 0/10 reported.     Objective    Treatment: Patient  was instructed in and performed therapeutic exercises to develop strength, ROM, flexibility, balance and joint mobility. Patient performed therapeutic exercises consisting of the following      Leg press   Recumbent bike  Upright bike   UE ergometer  Treadmill   Elliptical 1-2' - endurance compromised.       THERAPEUTIC EXERCISE 1-1 PT = 30'   SUPINE   -heel cord stretch 5'   -DF/PF x50 knee flexed at 30   -inver/ever x50   -circles x40 ea  -ABCs   -posterioir tib and peroneal stretch 30" x 4 ea. W/strap     SIDELYING  -eversion / inversion x30    PRONE    STANDING.  -gastroc stretch 2'  -soleus stretch 2'   -Toe stretch 2'  -heel raises x20    SITTING  -toe curls x20  -toes stretch 1' x 2   -prostretch 2'  -inversion x20  -DF knee flexed / knee extended x20ea  -anterior tib stretch 30" x 4 with DF / PF x15   +soleus stretch   -self stretch ( ant tib, peroneal, post tib ) 10" x 12 ea       MANUAL THERAPY: manual mobs at the  sub talar joint and tibiotalar joint supine. Mulligan Belt fot TT mobs. Mobs for the mid tarsal joints. 1-1 = PT 5'   MODALITY  DIRECT EDUCATION:    Patient was issued HEP        Assessment    Routine performed and completed " w/o limitation from pain. He reported that it felt better at the end of the session. Was not comfortable due to swelling.   Medical Necessity is demonstrated by:   Unable/ limited ability  to participate in daily activities  Limited mobility, weight bearing status, diminished neuromuscular response and weakness limits function of effected part for some activities, Requires skilled supervision to complete and progress treatment interventions and HEP.  Pt demo good understanding of the education provided. Patient demonstrated good return demonstration of activities.Patient's tolerance to treatment was good. Patient will continue to benefit from skilled PTintervention.Patient is making progress towards established goals.  New/Revised goals: no  Continue with established Plan of Care towards PT goals.   CMS Impairment/Limitation/Restriction for FOTO Lower Leg (w/o Knee) Survey  Status Limitation G-Code CMS Severity Modifier  Intake 30% 70%  Predicted 42% 58% Goal Status+ CK - At least 40 percent but less than 60 percent  12/1/2017 41% 59% Current Status CK - At least 40 percent but less than 60 percent  D/C Status CK **only report if this is discharge survey  +Based on FOTO predicted change score  * Mean, Risk Adjusted, Intake Composite FS measures from FOTO aggregate database.  ** As indicated by the ICF assignments to the survey items in the FOTO survey used.  [facility] - [facility] [place]  FUNCTIONAL STATUS SUMMARY (11/14/2017)  Patient: Janusz Montgomery Jr. HATFIELD ([id number]) Primary Body Part: Lower Leg (w/o Knee) Initial DOS: 11/14/2017  Produced and © [id number] by  [employer]    PLAN     Certification Period: 11/14/2017 to 2/14/2018  Recommended Treatment Plan: 2 times per week for 12 weeks: Manual Therapy, Neuromuscular Re-ed, Patient Education and Therapeutic Exercise  Other Recommendations: modalities PRN   Pt may be seen by PTA as part of the rehabilitation team.      Therapist:  Yusuf Farooq, PT

## 2017-12-19 NOTE — TELEPHONE ENCOUNTER
[employer] was given to patient on 12/19/2017 4:40 PM     Spoke w/patient, states yes he can complete. States he pass over and  fitkit today.

## 2017-12-20 ENCOUNTER — OFFICE VISIT (OUTPATIENT)
Dept: NEPHROLOGY | Facility: CLINIC | Age: 72
End: 2017-12-20
Payer: MEDICARE

## 2017-12-20 VITALS
WEIGHT: 315 LBS | OXYGEN SATURATION: 96 % | HEART RATE: 54 BPM | DIASTOLIC BLOOD PRESSURE: 66 MMHG | SYSTOLIC BLOOD PRESSURE: 130 MMHG | BODY MASS INDEX: 41.75 KG/M2 | HEIGHT: 73 IN

## 2017-12-20 DIAGNOSIS — I10 ESSENTIAL HYPERTENSION: Primary | ICD-10-CM

## 2017-12-20 DIAGNOSIS — E78.5 HYPERLIPIDEMIA, UNSPECIFIED HYPERLIPIDEMIA TYPE: ICD-10-CM

## 2017-12-20 PROCEDURE — 99999 PR PBB SHADOW E&M-EST. PATIENT-LVL III: CPT | Mod: PBBFAC,GC,, | Performed by: HOSPITALIST

## 2017-12-20 PROCEDURE — 99213 OFFICE O/P EST LOW 20 MIN: CPT | Mod: GC,S$GLB,, | Performed by: HOSPITALIST

## 2017-12-21 ENCOUNTER — CLINICAL SUPPORT (OUTPATIENT)
Dept: REHABILITATION | Facility: HOSPITAL | Age: 72
End: 2017-12-21
Attending: PHYSICIAN ASSISTANT
Payer: MEDICARE

## 2017-12-21 DIAGNOSIS — M25.673 DECREASED RANGE OF MOTION OF ANKLE: ICD-10-CM

## 2017-12-21 DIAGNOSIS — S82.402F TYPE III OPEN FRACTURE OF LEFT TIBIA AND FIBULA WITH ROUTINE HEALING, SUBSEQUENT ENCOUNTER: Primary | ICD-10-CM

## 2017-12-21 DIAGNOSIS — M67.01 HEEL CORD TIGHTNESS, RIGHT: ICD-10-CM

## 2017-12-21 DIAGNOSIS — Z74.09 IMPAIRED MOBILITY: ICD-10-CM

## 2017-12-21 DIAGNOSIS — S82.202F TYPE III OPEN FRACTURE OF LEFT TIBIA AND FIBULA WITH ROUTINE HEALING, SUBSEQUENT ENCOUNTER: Primary | ICD-10-CM

## 2017-12-21 PROCEDURE — 97110 THERAPEUTIC EXERCISES: CPT | Mod: PN

## 2017-12-21 NOTE — PROGRESS NOTES
"                                          Physical Therapy Daily Note           Name: Janusz Montgomery Jr.  Clinic Number: [id number]  Date of Treatment: 12/21/2017   Diagnosis:   Encounter Diagnoses   Name Primary?    Type III open fracture of left tibia and fibula with routine healing, subsequent encounter Yes    Limited passive range of motion on supination of midtarsal joint     Impaired mobility     Heel cord tightness, right     Decreased range of motion of ankle        Time in: 1510  Time Out:1605  Total Treatment Time: 65  VISITS / PURDY: 11/20 - 12/31/207  BOLD=INDICATES ACTIVITIES PERFORMED.      Subjective  Patient states that last week he had a lot of swelling in his ankle, but it is much better today.   Pain level - 0/10 reported.     Objective    Treatment: Patient  was instructed in and performed therapeutic exercises to develop strength, ROM, flexibility, balance and joint mobility. Patient performed therapeutic exercises consisting of the following      Leg press   Recumbent bike  Upright bike   UE ergometer  Treadmill   Elliptical 1-2' - endurance compromised.       THERAPEUTIC EXERCISE 1-1 PT = 30'   SUPINE   -heel cord stretch 5'   -DF/PF x50 knee flexed at 30   -inver/ever x50   -circles x40 ea  -ABCs   -posterioir tib and peroneal stretch 30" x 4 ea. W/strap     SIDELYING  -eversion / inversion x30    PRONE    STANDING.  -gastroc stretch 2'  -soleus stretch 2'   -Toe stretch 2'  -heel raises x20    SITTING  -toe curls x20  -toes stretch 1' x 2   -prostretch 2'  -inversion x20  -DF knee flexed / knee extended x20ea  -anterior tib stretch 30" x 4 with DF / PF x15   +soleus stretch   -self stretch ( ant tib, peroneal, post tib ) 10" x 12 ea       MANUAL THERAPY: manual mobs at the  sub talar joint and tibiotalar joint supine. Mulligan Belt fot TT mobs. Mobs for the mid tarsal joints. 1-1 = PT 5'   MODALITY  DIRECT EDUCATION:    Patient was issued HEP        Assessment    Routine performed and " completed w/o limitation from pain. PT with excellent recall of all exercises. Pt with good response to manual care with improvements in ankle ROM demonstrated post treatment session.   Medical Necessity is demonstrated by:   Unable/ limited ability  to participate in daily activities  Limited mobility, weight bearing status, diminished neuromuscular response and weakness limits function of effected part for some activities, Requires skilled supervision to complete and progress treatment interventions and HEP.  Pt demo good understanding of the education provided. Patient demonstrated good return demonstration of activities.Patient's tolerance to treatment was good. Patient will continue to benefit from skilled PTintervention.Patient is making progress towards established goals.  New/Revised goals: no  Continue with established Plan of Care towards PT goals.   CMS Impairment/Limitation/Restriction for FOTO Lower Leg (w/o Knee) Survey  Status Limitation G-Code CMS Severity Modifier  Intake 30% 70%  Predicted 42% 58% Goal Status+ CK - At least 40 percent but less than 60 percent  12/1/2017 41% 59% Current Status CK - At least 40 percent but less than 60 percent  D/C Status CK **only report if this is discharge survey  +Based on FOTO predicted change score  * Mean, Risk Adjusted, Intake Composite FS measures from FOTO aggregate database.  ** As indicated by the ICF assignments to the survey items in the FOTO survey used.  [facility] [place]  FUNCTIONAL STATUS SUMMARY (11/14/2017)  Patient: Ulises Janusz Jr. HATFIELD ([id number]) Primary Body Part: Lower Leg (w/o Knee) Initial DOS: 11/14/2017  Produced and © [id number] by  [employer]    PLAN     Certification Period: 11/14/2017 to 2/14/2018  Recommended Treatment Plan: 2 times per week for 12 weeks: Manual Therapy, Neuromuscular Re-ed, Patient Education and Therapeutic Exercise  Other Recommendations: modalities PRN   Pt may be seen  by PTA as part of the rehabilitation team.      Therapist: Ángela Andrade, PT

## 2017-12-26 ENCOUNTER — OFFICE VISIT (OUTPATIENT)
Dept: UROLOGY | Facility: CLINIC | Age: 72
End: 2017-12-26
Payer: MEDICARE

## 2017-12-26 VITALS
DIASTOLIC BLOOD PRESSURE: 58 MMHG | SYSTOLIC BLOOD PRESSURE: 120 MMHG | WEIGHT: 315 LBS | BODY MASS INDEX: 41.75 KG/M2 | HEIGHT: 73 IN

## 2017-12-26 DIAGNOSIS — R35.1 NOCTURIA MORE THAN TWICE PER NIGHT: ICD-10-CM

## 2017-12-26 DIAGNOSIS — N52.9 ED (ERECTILE DYSFUNCTION) OF ORGANIC ORIGIN: ICD-10-CM

## 2017-12-26 DIAGNOSIS — C61 PROSTATE CANCER: Primary | ICD-10-CM

## 2017-12-26 PROCEDURE — 99214 OFFICE O/P EST MOD 30 MIN: CPT | Mod: 25,S$GLB,, | Performed by: UROLOGY

## 2017-12-26 PROCEDURE — 99999 PR PBB SHADOW E&M-EST. PATIENT-LVL III: CPT | Mod: PBBFAC,,, | Performed by: UROLOGY

## 2017-12-26 PROCEDURE — 99499 UNLISTED E&M SERVICE: CPT | Mod: S$GLB,,, | Performed by: UROLOGY

## 2017-12-26 PROCEDURE — 81001 URINALYSIS AUTO W/SCOPE: CPT | Mod: S$GLB,,, | Performed by: UROLOGY
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
## 2017-12-27 NOTE — PATIENT INSTRUCTIONS
Discharge Instructions: Eating a Low-Salt Diet  Your health care provider has prescribed a low-salt diet for you. Most people with heart problems need to eat less salt, which is full of sodium. Too much sodium is linked to high blood pressure, which is linked to a greater risk of heart disease, stroke, blindness, and kidney problems.  Home care    Learn ways to cut back on salt (sodium):  · Eat less frozen, canned, dried, packaged, and fast foods. These often contain high amounts of sodium.  · Season foods with herbs instead of salt when you cook.  · Season with flavorings such as pepper, lemon, garlic, and onion.  · Dont add salt to your food at the table.  · Sprinkle salt-free herbal blends on meats and vegetables.  Learn to read food labels carefully:  · Look for the total amount of sodium per serving.  · Look for foods labeled low sodium, reduced sodium, or no added salt.  · Beware. Salt goes by many names. Cut down on foods with these words (all forms of salt) listed as ingredients:  ¨ Salt  ¨ Sodium  ¨ Soy sauce  ¨ Baking soda  ¨ Baking powder  ¨ MSG  ¨ Monosodium  ¨ Na (the chemical symbol for sodium)  Other ideas:  · Use more fresh food. Buy more fruits and vegetables.  · Select lean meats, fish, and poultry.  · Find a cookbook with low-salt recipes. Youll find ideas for tasty meals that are healthy for your heart.  ·   When eating out, ask questions about the menu. Tell the  you're on a low-salt diet.  ¨ If you order fish, chicken, beef, or pork, ask the  to have it broiled, baked, poached, or grilled without salt, butter, or breading.  ¨ Choose plain steamed rice, boiled noodles, and baked or boiled potatoes. Top potatoes with chives and a little sour cream instead of butter.  · Avoid antacids that are high in salt. Check the label before you buy.  Follow-up  Make a follow-up appointment with a nutritionist as directed by our staff.  Date Last Reviewed: 6/20/2015  © 4681-9984 [employer] [address] [place] [id number]. All rights reserved. This information is not intended as a substitute for professional medical care. Always follow your healthcare professional's instructions.        Insuficiencia Renal Crónica [Chronic Kidney Disease]     La función de los riñones es eliminar los productos de desecho y el exceso de agua de la jasmin. Cuando los riñones no funcionan normalmente y los productos de desecho se empiezan a acumular en la jasmin, se habla de insuficiencia renal. Cuando está avanzada, se llama insuficiencia renal crónica o enfermedad renal terminal. La insuficiencia renal crónica permite que el exceso de agua, desechos y sustancias tóxicas se acumulen en el cuerpo. [place] puede ser potencialmente mortal en el futuro, haciendo necesaria la diálisis o un trasplante de riñón para permanecer vivo.  La diabetes es la causa principal de la insuficiencia renal crónica. Otras causas son la presión arterial vidya, el endurecimiento de las arterias (aterosclerosis), el lupus, la inflamación de los vasos sanguíneos (vasculitis), infecciones virales y bacterianas previas y otras causas. Ciertos analgésicos de venta sin receta pueden causar insuficiencia renal cuando se maldonado a menudo simon un período marisabel de tiempo. Éstos son la aspirina, acetaminofeno (Tylenol), ibuprofeno (Advil, Motrin) y los medicamentos antiinflamatorios relacionados.  Cuidado En Casa:  1. Si tiene diabetes, hable con robison medico acerca de la calidad de robison control del azúcar en la jasmin y los ajustes necesarios para robison dieta.  2. Si tiene presión arterial vidya:  ¨ Superior el medicamento recetado para bajar la presión arterial al objetivo recomendado de 130/80 mm Hg.  ¨ Comience un programa de ejercicios regulares que usted disfrute. Consulte a robison médico para estar seguro de que el programa de ejercicios que planificó es el adecuado para usted.  ¨ Reduzca el consumo de sal (sodio). Robison médico puede decirle  cuánta sal es conveniente que coma al día.  3. Si tiene sobrepeso, hable con robison médico acerca de un plan para adelgazar.  4. Si fuma, debe dejar. Fumar empeora la enfermedad renal. Hable con robison médico acerca de las maneras que hay para ayudarle a dejar de fumar. Para más información visite los siguientes enlaces:  ¨ www.smokefree.gov/pubs/clearing_the_air.pdf  ¨ www.smokefree.gov  ¨ www.cancer.org/healthy/stayawayfromtobacco/guidetoquittingsmoking/  5. Todos los pacientes con insuficiencia renal crónica necesitan seguir hagn dieta especial. Asegúrese de entender la suya. En general, necesitará restringir las proteínas, sal, potasio y fósforo. También necesita reducir el consumo de líquidos.  6. La insuficiencia renal crónica es un factor de riesgo para la insuficiencia cardíaca. Hable con robison médico sobre cualquier otro factor de riesgo que pueda tener y qué puede hacer para reducirlos.  7. Hable con robison médico sobre cualquier mdicamento que esté tomando para determinar si tiene que reducir la dosis o dejarlo.  8. Evite los siguientes medicamentos de venta phillip, o consulte a robison médico antes de usarlos:  ¨ Aspirina y drogas antiinflamatorias no esteroides (MING) eduard el ibuprofeno, naprosina (El uso a corto plazo de acetaminofén para la fiebre o el dolor está renetta.)  ¨ Laxantes y antiácidos que contengan magnesio o aluminio  ¨ Fleet o enemas con sales de fósoforo o que contengan fósforo  ¨ Cierta medicina bloqueadora del ácido estomacal eduard la cimetidina, ranitidina  ¨ Descongestivos que contengan seudo-efedrina  ¨ Suplementos herbales  Seguimiento  con robison médico o de acuerdo a lo indicado por nuestro personal. Para mayor información contacte a alguno de los siguientes:  · Asociación Americana de Pacientes Renales (834) 248-6951 www.aakp.org  · Fundación Nacional del [place] (340) 191-5503 www.kidney.org  · [place] ([phone] 749-0088 www.kidneyfund.org  · Programa Nacional de Educación sobre  Insuficiencia Renal (660) 4KIDNEY www.nkdep.[facility].gov  Si le realizaron hang radiografía, un electrocardiograma u otro análisis de diagnóstico, se le informará de cualquier nuevo hallazgo que pueda afectar robison atención.  Busque Prontamente Atención Médica  si algo de lo siguiente ocurre:  · Náuseas o vómito  · Fiebre de más de 100.4°F (38°C)  · Aumento de peso inesperado o hinchazón de las rodillas, tobillos o alrededor de los ojos  · Disminución o ausencia de orina  Llame al 911  · Debilidad, mareos, desmayos, adormecimiento o confusión severos  · Dolor en el pecho o falta de aire  · El corazón late rápido, despacio o de manera irregular  Date Last Reviewed: 12/19/2016  © 4642-0453 [employer] [address] [place] [address]. Todos los derechos reservados. Esta información no pretende sustituir la atención médica profesional. Sólo robison médico puede diagnosticar y tratar un problema de rob.    Avoid Aleve, Motrin, Ibuprofen and other products containing NSAIDs (non-steroidal anti-inflammatories).

## 2017-12-28 LAB
BILIRUB SERPL-MCNC: NORMAL MG/DL
BLOOD URINE, POC: NORMAL
COLOR, POC UA: YELLOW
GLUCOSE UR QL STRIP: NORMAL
KETONES UR QL STRIP: NORMAL
LEUKOCYTE ESTERASE URINE, POC: NORMAL
NITRITE, POC UA: NORMAL
PH, POC UA: 6
PROTEIN, POC: NORMAL
SPECIFIC GRAVITY, POC UA: 1005
UROBILINOGEN, POC UA: NORMAL

## 2017-12-29 NOTE — PROGRESS NOTES
ATTENDING PHYSICIAN ATTESTATION  I have personally interviewed and examined the patient. I thoroughly reviewed the demographic, clinical, laboratorial and imaging information available in medical records. I agree with the assessment and recommendations provided by the subspecialty resident. Dr. Moctezuma was under my supervision.

## 2018-01-02 ENCOUNTER — CLINICAL SUPPORT (OUTPATIENT)
Dept: REHABILITATION | Facility: HOSPITAL | Age: 73
End: 2018-01-02
Attending: PHYSICIAN ASSISTANT
Payer: MEDICARE

## 2018-01-02 DIAGNOSIS — S82.202F TYPE III OPEN FRACTURE OF LEFT TIBIA AND FIBULA WITH ROUTINE HEALING, SUBSEQUENT ENCOUNTER: Primary | ICD-10-CM

## 2018-01-02 DIAGNOSIS — Z74.09 IMPAIRED MOBILITY: ICD-10-CM

## 2018-01-02 DIAGNOSIS — S82.402F TYPE III OPEN FRACTURE OF LEFT TIBIA AND FIBULA WITH ROUTINE HEALING, SUBSEQUENT ENCOUNTER: Primary | ICD-10-CM

## 2018-01-02 DIAGNOSIS — M67.01 HEEL CORD TIGHTNESS, RIGHT: ICD-10-CM

## 2018-01-02 PROCEDURE — 97110 THERAPEUTIC EXERCISES: CPT | Mod: PN

## 2018-01-02 NOTE — PROGRESS NOTES
"                                          Physical Therapy Daily Note           Name: Janusz Montgomery Jr.  Clinic Number: [id number]  Date of Treatment: 01/02/2018   Diagnosis:   Encounter Diagnoses   Name Primary?    Type III open fracture of left tibia and fibula with routine healing, subsequent encounter Yes    Limited passive range of motion on supination of midtarsal joint     Impaired mobility     Heel cord tightness, right        Time in: 1400  Time Out:1500  Total Treatment Time: 60  VISITS / PURDY: 11/20 - 12/31/207  BOLD=INDICATES ACTIVITIES PERFORMED.      Subjective  Patient states that he had some stiffness in his ankle over the holiday break, yet without pain.   Pain level - 0/10 reported.     Objective    Treatment: Patient  was instructed in and performed therapeutic exercises to develop strength, ROM, flexibility, balance and joint mobility. Patient performed therapeutic exercises consisting of the following      Leg press   Recumbent bike  Upright bike   UE ergometer  Treadmill   Elliptical 1-2' - endurance compromised.       THERAPEUTIC EXERCISE 1-1 PT = 30'   SUPINE   -heel cord stretch 5'   -DF/PF x50 knee flexed at 30   -inver/ever x50   -circles x40 ea  -ABCs   -posterioir tib and peroneal stretch 30" x 4 ea. W/strap     SIDELYING  -eversion / inversion x30    PRONE    STANDING.  -gastroc stretch 2'  -soleus stretch 2'   -Toe stretch 2'  -heel raises x20    SITTING  -toe curls x20  -toes stretch 1' x 2   -prostretch 2'  -inversion x20  -DF knee flexed / knee extended x20ea  -anterior tib stretch 30" x 4 with DF / PF x15   +soleus stretch   -self stretch ( ant tib, peroneal, post tib ) 10" x 12 ea       MANUAL THERAPY: manual mobs at the  sub talar joint and tibiotalar joint supine. [place] fot TT mobs. Mobs for the mid tarsal joints. 1-1 = PT 5'   MODALITY  DIRECT EDUCATION:    Patient was issued HEP        Assessment    Routine performed and completed w/o limitation from pain. PT with " excellent recall of all exercises, yet continues to demonstrate significant limitations in ankle ROM. Pt with good response to manual care with improvements in ankle ROM demonstrated post treatment session.   Medical Necessity is demonstrated by:   Unable/ limited ability  to participate in daily activities  Limited mobility, weight bearing status, diminished neuromuscular response and weakness limits function of effected part for some activities, Requires skilled supervision to complete and progress treatment interventions and HEP.  Pt demo good understanding of the education provided. Patient demonstrated good return demonstration of activities.Patient's tolerance to treatment was good. Patient will continue to benefit from skilled PTintervention.Patient is making progress towards established goals.  New/Revised goals: no  Continue with established Plan of Care towards PT goals.   CMS Impairment/Limitation/Restriction for FOTO Lower Leg (w/o Knee) Survey  Status Limitation G-Code CMS Severity Modifier  Intake 30% 70%  Predicted 42% 58% Goal Status+ CK - At least 40 percent but less than 60 percent  12/1/2017 41% 59% Current Status CK - At least 40 percent but less than 60 percent  D/C Status CK **only report if this is discharge survey  +Based on FOTO predicted change score  * Mean, Risk Adjusted, Intake Composite FS measures from FOTO aggregate database.  ** As indicated by the ICF assignments to the survey items in the FOTO survey used.  [facility] [place]  FUNCTIONAL STATUS SUMMARY (11/14/2017)  Patient: Ulises Janusz Jr. HATFIELD ([id number]) Primary Body Part: Lower Leg (w/o Knee) Initial DOS: 11/14/2017  Produced and © [id number] by  [employer]    PLAN     Certification Period: 11/14/2017 to 2/14/2018  Recommended Treatment Plan: 2 times per week for 12 weeks: Manual Therapy, Neuromuscular Re-ed, Patient Education and Therapeutic Exercise  Other Recommendations:  modalities PRN   Pt may be seen by PTA as part of the rehabilitation team.      Therapist: Ángela Andrade PT

## 2018-01-03 ENCOUNTER — OFFICE VISIT (OUTPATIENT)
Dept: PODIATRY | Facility: CLINIC | Age: 73
End: 2018-01-03
Payer: MEDICARE

## 2018-01-03 VITALS
SYSTOLIC BLOOD PRESSURE: 160 MMHG | WEIGHT: 315 LBS | DIASTOLIC BLOOD PRESSURE: 80 MMHG | BODY MASS INDEX: 41.75 KG/M2 | HEIGHT: 73 IN

## 2018-01-03 DIAGNOSIS — I73.9 PVD (PERIPHERAL VASCULAR DISEASE): Primary | ICD-10-CM

## 2018-01-03 DIAGNOSIS — L90.9 PLANTAR FAT PAD ATROPHY: ICD-10-CM

## 2018-01-03 DIAGNOSIS — Z87.2 HEALED ULCER OF LEFT FOOT ON EXAMINATION: ICD-10-CM

## 2018-01-03 PROCEDURE — 99213 OFFICE O/P EST LOW 20 MIN: CPT | Mod: S$GLB,,, | Performed by: PODIATRIST

## 2018-01-03 PROCEDURE — 99999 PR PBB SHADOW E&M-EST. PATIENT-LVL III: CPT | Mod: PBBFAC,,, | Performed by: PODIATRIST

## 2018-01-03 PROCEDURE — 99499 UNLISTED E&M SERVICE: CPT | Mod: S$GLB,,, | Performed by: PODIATRIST

## 2018-01-04 ENCOUNTER — CLINICAL SUPPORT (OUTPATIENT)
Dept: REHABILITATION | Facility: HOSPITAL | Age: 73
End: 2018-01-04
Attending: PHYSICIAN ASSISTANT
Payer: MEDICARE

## 2018-01-04 ENCOUNTER — PATIENT MESSAGE (OUTPATIENT)
Dept: FAMILY MEDICINE | Facility: CLINIC | Age: 73
End: 2018-01-04

## 2018-01-04 DIAGNOSIS — S82.202F TYPE III OPEN FRACTURE OF LEFT TIBIA AND FIBULA WITH ROUTINE HEALING, SUBSEQUENT ENCOUNTER: Primary | ICD-10-CM

## 2018-01-04 DIAGNOSIS — M67.01 HEEL CORD TIGHTNESS, RIGHT: ICD-10-CM

## 2018-01-04 DIAGNOSIS — M25.673 DECREASED RANGE OF MOTION OF ANKLE: ICD-10-CM

## 2018-01-04 DIAGNOSIS — M25.60 RESTRICTION OF JOINT MOTION: ICD-10-CM

## 2018-01-04 DIAGNOSIS — R26.89 BALANCE PROBLEMS: ICD-10-CM

## 2018-01-04 DIAGNOSIS — Z74.09 IMPAIRED MOBILITY: ICD-10-CM

## 2018-01-04 DIAGNOSIS — S82.402F TYPE III OPEN FRACTURE OF LEFT TIBIA AND FIBULA WITH ROUTINE HEALING, SUBSEQUENT ENCOUNTER: Primary | ICD-10-CM

## 2018-01-04 DIAGNOSIS — R29.898 RIGHT LEG WEAKNESS: ICD-10-CM

## 2018-01-04 PROCEDURE — 97110 THERAPEUTIC EXERCISES: CPT | Mod: PN | Performed by: PHYSICAL THERAPIST

## 2018-01-04 NOTE — PROGRESS NOTES
"                                          Physical Therapy Daily Note           Name: Janusz Montgomery Jr.  Clinic Number: [id number]  Date of Treatment: 01/04/2018   Diagnosis:   Encounter Diagnoses   Name Primary?    Type III open fracture of left tibia and fibula with routine healing, subsequent encounter Yes    Limited passive range of motion on supination of midtarsal joint     Balance problems     Decreased range of motion of ankle     Impaired mobility     Right leg weakness     Heel cord tightness, right     Restriction of joint motion        Time in: 1520  Time Out:16  Total Treatment Time:   VISITS / PURDY: 12/20 - 12/31/207  BOLD=INDICATES ACTIVITIES PERFORMED.      Subjective  Patient states the ankle is not painful or the foot. Feels like it is moving better.   Pain level - 0/10 reported.     Objective    Treatment: Patient  was instructed in and performed therapeutic exercises to develop strength, ROM, flexibility, balance and joint mobility. Patient performed therapeutic exercises consisting of the following      Leg press   Recumbent bike  Upright bike   UE ergometer  Treadmill   Elliptical 1-2' - endurance compromised.       THERAPEUTIC EXERCISE 1-1 PT = 30'   SUPINE   -heel cord stretch 5'   -DF/PF x50 knee flexed at 30   -inver/ever x50   -circles x40 ea  -ABCs   -posterioir tib and peroneal stretch 30" x 4 ea. W/strap     SIDELYING  -eversion / inversion x30    PRONE    STANDING.  -gastroc stretch 2'  -soleus stretch 2'   -Toe stretch 2'  -heel raises x20  =SL stand on airex  =SL stand on airex with fwd lean and back lean.   SITTING    -toe curls x20  -toes stretch 1' x 2   -prostretch 2'  -inversion x20  -DF knee flexed / knee extended x20ea  -anterior tib stretch 30" x 4 with DF / PF x15   +soleus stretch 2'   -self stretch ( ant tib, peroneal, post tib ) 10" x 12 ea       MANUAL THERAPY: manual mobs at the  sub talar joint and tibiotalar joint supine.  1-1 = PT 5'   MODALITY  DIRECT " EDUCATION:    Patient was issued HEP        Assessment    Demonstrating seemingly increased passive mobility at the subtalar and tibiotalar joints. Mobility restricted or unchanged at the tibiotalar and first MTP. He has limited active toe extension of the great toe. Gaining more activation of the post tibialis.   Medical Necessity is demonstrated by:   Unable/ limited ability  to participate in daily activities  Limited mobility, weight bearing status, diminished neuromuscular response and weakness limits function of effected part for some activities, Requires skilled supervision to complete and progress treatment interventions and HEP.  Pt demo good understanding of the education provided. Patient demonstrated good return demonstration of activities.Patient's tolerance to treatment was good. Patient will continue to benefit from skilled PTintervention.Patient is making progress towards established goals.  New/Revised goals: no  Continue with established Plan of Care towards PT goals.   CMS Impairment/Limitation/Restriction for FOTO Lower Leg (w/o Knee) Survey  Status Limitation G-Code CMS Severity Modifier  Intake 30% 70%  Predicted 42% 58% Goal Status+ CK - At least 40 percent but less than 60 percent  12/1/2017 41% 59% Current Status CK - At least 40 percent but less than 60 percent  D/C Status CK **only report if this is discharge survey  +Based on FOTO predicted change score  * Mean, Risk Adjusted, Intake Composite FS measures from FOTO aggregate database.  ** As indicated by the ICF assignments to the survey items in the FOTO survey used.  [facility] - [facility] [place]  FUNCTIONAL STATUS SUMMARY (11/14/2017)  Patient: Janusz Montgomery Jr. HATFIELD ([id number]) Primary Body Part: Lower Leg (w/o Knee) Initial DOS: 11/14/2017  Produced and © [id number] by  [employer]    PLAN     Certification Period: 11/14/2017 to 2/14/2018  Recommended Treatment Plan: 2 times per week for 12 weeks:  Manual Therapy, Neuromuscular Re-ed, Patient Education and Therapeutic Exercise  Other Recommendations: modalities PRN   Pt may be seen by PTA as part of the rehabilitation team.      Therapist: Yusuf Farooq, PT

## 2018-01-04 NOTE — PROGRESS NOTES
Subjective:      Patient ID: Janusz Montgomery Jr. is a 72 y.o. male.    Chief Complaint: Peripheral Vascular Disease (recheck left heel pcp Dr. Weathers 4/28/17)    Janusz Montgomery Jr. is a 72 y.o. male returns to clinic for follow up of left foot ulcers.  Patient's wife has been inspecting foot daily.  He relates callus formation where wound was present and concern on if ulceration may be present beneath callus.   Patient has been moisturizing skin daily.   No new pedal complaints. Patient would like to start swimming    Shoe gear:  CAM boot left and tennis shoe right    Chief Complaint   Patient presents with    Peripheral Vascular Disease     recheck left heel pcp Dr. Weathers 4/28/17       Hemoglobin A1C   Date Value Ref Range Status   07/11/2016 5.3 4.5 - 6.2 % Final     Comment:     According to ADA guidelines, hemoglobin A1C <7.0% represents  optimal control in non-pregnant diabetic patients.  Different  metrics may apply to specific populations.   Standards of Medical Care in Diabetes - 2016.  For the purpose of screening for the presence of diabetes:  <5.7%     Consistent with the absence of diabetes  5.7-6.4%  Consistent with increasing risk for diabetes   (prediabetes)  >or=6.5%  Consistent with diabetes  Currently no consensus exists for use of hemoglobin A1C  for diagnosis of diabetes for children.     03/31/2015 5.3 4.5 - 6.2 % Final   03/03/2014 5.6 4.5 - 6.2 % Final         Current Outpatient Prescriptions on File Prior to Visit   Medication Sig Dispense Refill    acyclovir (ZOVIRAX) 800 MG Tab Take 1 tablet (800 mg total) by mouth once daily. 90 tablet 4    ALPRAZolam (XANAX XR) 0.5 MG Tb24 Take 1 tablet (0.5 mg total) by mouth once daily. 15 tablet 0    artificial tears (ISOPTO TEARS) 0.5 % ophthalmic solution Place 1 drop into both eyes as needed. (Patient taking differently: Place 1 drop into both eyes as needed (for dry eyes). )      aspirin 325 MG tablet Take 1 tablet (325 mg total) by mouth  once daily.  0    atorvastatin (LIPITOR) 40 MG tablet Take 1 tablet (40 mg total) by mouth once daily. 90 tablet 0    CYANOCOBALAMIN, VITAMIN B-12, (VITAMIN B-12 ORAL) Take 2,500 mcg by mouth once daily.      furosemide (LASIX) 20 MG tablet Take 1 tablet (20 mg total) by mouth 2 (two) times daily. 10 tablet 0    gabapentin (NEURONTIN) 300 MG capsule Take 1 capsule (300 mg total) by mouth 2 (two) times daily. 90 capsule 11    melatonin 3 mg Tab Take 6 mg by mouth nightly as needed (for sleep).       ramipril (ALTACE) 5 MG capsule Take 1 capsule (5 mg total) by mouth once daily. 90 capsule 0    tamsulosin (FLOMAX) 0.4 mg Cp24 Take 1 capsule (0.4 mg total) by mouth once daily. 90 capsule 0    venlafaxine (EFFEXOR) 75 MG tablet Take 2 tablets (150 mg total) by mouth 2 (two) times daily. 360 tablet 0    vitamin D 1000 units Tab Take 1 tablet (1,000 Units total) by mouth once daily.      zinc sulfate (ZINCATE) 220 (50) mg capsule Take 1 capsule (220 mg total) by mouth once daily.       No current facility-administered medications on file prior to visit.        Allergies   Allergen Reactions    Ciprofloxacin Rash     Diffuse pruritic morbilliform rash developed 3/15/2017 after dose of cipro; previously in 2/2017 he had rash/fevers after initiation of cipro    Zosyn [Piperacillin-Tazobactam] Anaphylaxis     Diffuse pruritic morbilliform rash developed 3/15/2017.  Then, 430am dose on 3/16 and rash worsened with SOB/tachypnea but no hypoxemia.     Bacitracin Itching and Rash     Violaceous rash in area of topical Tx.        Past Surgical History:   Procedure Laterality Date    Cardiac stenting x2      CATARACT EXTRACTION W/  INTRAOCULAR LENS IMPLANT Right 3/29/2016    Dr. Conteh    CATARACT EXTRACTION W/  INTRAOCULAR LENS IMPLANT Left 4/12/2016        EXTERNAL FIXATION TIBIAL FRACTURE Left 07/01/2016    ORIF TIBIA FRACTURE Left 07/15/2016    Squamous cell cancer removal x3 with Mohs surgery  "     TONSILLECTOMY      TOTAL KNEE ARTHROPLASTY  10/2012    trus/bx         Family History   Problem Relation Age of Onset    Skin cancer Father     Lung cancer Father     Cancer Father      smoker,     Alzheimer's disease Mother     Hypertension Mother     Cancer Sister      colon, lung cancer     Cancer Brother      skin cancer, polypectomy     Peripheral vascular disease      Melanoma Neg Hx     Psoriasis Neg Hx     Lupus Neg Hx     Eczema Neg Hx     Amblyopia Neg Hx     Blindness Neg Hx     Cataracts Neg Hx     Diabetes Neg Hx     Glaucoma Neg Hx     Macular degeneration Neg Hx     Retinal detachment Neg Hx     Strabismus Neg Hx     Stroke Neg Hx     Thyroid disease Neg Hx     Acne Neg Hx        Social History     Social History    Marital status:      Spouse name: N/A    Number of children: N/A    Years of education: N/A     Occupational History    Retired       Social History Main Topics    Smoking status: Never Smoker    Smokeless tobacco: Never Used    Alcohol use Yes      Comment: occasionally    Drug use: No    Sexual activity: Yes     Other Topics Concern    Not on file     Social History Narrative    No narrative on file       Review of Systems   Constitution: Negative for chills, fever and weakness.   Cardiovascular: Negative for claudication and leg swelling.   Respiratory: Negative for cough and shortness of breath.    Skin: Positive for dry skin. Negative for itching, nail changes and rash.   Musculoskeletal: Positive for arthritis, joint pain and myalgias. Negative for falls, joint swelling and muscle weakness.   Gastrointestinal: Negative for diarrhea, nausea and vomiting.   Neurological: Positive for paresthesias. Negative for numbness and tremors.   Psychiatric/Behavioral: Negative for altered mental status and hallucinations.         Objective:       Vitals:    01/03/18 1417   BP: (!) 160/80   Weight: (!) 162.8 kg (358 lb 14.5 oz)   Height: 6' 1" " (1.854 m)   PainSc: 0-No pain       Physical Exam   Constitutional:  Non-toxic appearance. He does not have a sickly appearance. No distress.   Pt. is well-developed, well-nourished, appears stated age, in no acute distress, alert and oriented x 3. No evidence of depression, anxiety, or agitation. Calm, cooperative, and communicative. Appropriate interactions and affect.   Cardiovascular:   Pulses:       Dorsalis pedis pulses are 2+ on the right side, and 2+ on the left side.        Posterior tibial pulses are 1+ on the right side, and 1+ on the left side.        Pulmonary/Chest: No respiratory distress.   Musculoskeletal:        Right ankle: He exhibits swelling. No tenderness. No lateral malleolus, no medial malleolus, no AITFL, no CF ligament, no head of 5th metatarsal and no proximal fibula tenderness found. Achilles tendon exhibits no pain, no defect and normal Zabala's test results.        Left ankle: He exhibits swelling. No tenderness. No lateral malleolus, no medial malleolus, no AITFL, no CF ligament and no posterior TFL tenderness found. Achilles tendon exhibits no pain, no defect and normal Zabala's test results.        Right foot: There is no tenderness and no bony tenderness.        Left foot: There is no tenderness and no bony tenderness.   Decreased stride, station of gait.  apropulsive toe off.  Increased angle and base of gait.    Patient has hammertoes of digits 2-5 bilateral partially reducible without symptom today.     There is equinus deformity bilateral. There is limitation of dorsiflexion with knees extended and with knees flexed.    Shoes reveals lateral heel counter wear bilateral in athletic shoes.        Lymphadenopathy:   No lymphatic streaking    Negative lymphadenopathy bilateral popliteal fossa and tarsal tunnel.     Neurological:   [place] 5.07 monofilament is intact bilateral feet. Sharp/dull sensation is also intact Bilateral feet. Proprioception is grossly intact.  Vibratory sensation intact (pt able to sense vibration stop within 3-5 seconds)     Skin: Skin is warm and dry. Lesion and rash noted. No bruising, no burn and no laceration noted. He is not diaphoretic. There is erythema. No cyanosis. No pallor. Nails show no clubbing.   Ulcer location: posterior left heel  Signs of infection: local edema and erythema  Drainage:  None  Periwound: intact  Base: thin epithelial     Dermatitis and erythema noted to posterior left heel   Psychiatric: His mood appears not anxious. His affect is not inappropriate. His speech is not slurred. He is not combative. He is communicative. He is attentive.   Nursing note reviewed.      01/03 12/06              Assessment:       Encounter Diagnoses   Name Primary?    PVD (peripheral vascular disease) Yes    Healed ulcer of left foot on examination     Plantar fat pad atrophy          Plan:       Janusz was seen today for peripheral vascular disease.    Diagnoses and all orders for this visit:    PVD (peripheral vascular disease)    Healed ulcer of left foot on examination    Plantar fat pad atrophy      I counseled the patient on his conditions, their implications and medical management.    The wound is cleansed of foreign material as much as possible. The patient is alerted to watch for any signs of infection (redness, pus, pain, increased swelling or fever) and call if such occurs.    Wound has remained healed but with significant callus formation.  Patient advised to wear CAM boot  I advised patient to check feet daily for signs of drainage or lesion re-opening   Discussed use of daily foot moisturizer to feet, avoiding the webspace's    Patient can begin swimming however he must keep immersion in water to under 30 minutes    Follow-up:Patient is to return to the clinic in 2-3 months for follow-up but should call Prabhakarner immediately if any signs of infection, such as fever, chills, sweats, increased redness or pain.    Short-term  goals include maintaining good offloading and minimizing bioburden, promoting granulation and epithelialization to healing.  Long-term goals include keeping the wound healed by good offloading and medical management under the direction of internist.

## 2018-01-05 NOTE — PROGRESS NOTES
Patient, Janusz Montgomery Jr. (MRN #[id number]), presented with a recorded BMI of 47.35 kg/m^2 consistent with the definition of morbid obesity (ICD-10 E66.01). The patient's morbid obesity was monitored, evaluated, addressed and/or treated. This addendum to the medical record is made on 01/05/2018.

## 2018-01-05 NOTE — PROGRESS NOTES
"Subject came to clinic on 27Jun2017 for a screening visit for the [employer] P[employer] C. Diff vaccine study (IRB# [id number] PI: Maco; Protocol # H-030-014).      Before any study procedures were performed, , Mojgan Gold, obtained informed consent from the subject.    Adequate time was given for the subject to ask any questions and all questions answered without bias.  Subject verbalized desire to participate in the study, understanding he/she can withdraw consent at any time.  Subject and , Mojgan Gold, signed main study consent form, site maintains original copy, and signed copies were provided to the subject. Signed consent form will be scanned into patients medical record (please see Media tab in EPIC).  The subject was enrolled under protocol V 7.      (, Mojgan Gold,/ Dr. Dewey) have reviewed all Inclusion/Exclusion criteria will study participant. Patient meets all Inclusion criteria and none of the Exclusion criteria per protocol guidelines. Pt has had 2 hospitalizations within the past 12 months lasting over 24 hrs each and systemic antibiotic use.     The subject's demographics, medical history and concomitant medications were reviewed and captured.  Please see Dr. Qureshi's note regarding review of systems, physical exam, and vitals. Frailty questionnaire completed by patient. Subject's temperature was taken by Mojgan Gold and subject verified they were experiencing no diarrhea on day of randomization. Pt did clarify regarding his current prostate cancer, he is not currently receiving any type of chemotherapy or radiation for treatment. The only form of "treatment" he is undergoing currently is frequent TRUS biopsies and being followed by his urologist.     The subject was randomized and received subject # [id number]. The patient was assigned to the Main sub group.    Blood sample was obtained, processed, and stored per protocol.  Subject " received the 1st vaccine by unblinded study staff, Clara Lepe . Subject was kept under observation by Mojgan Gold for 30 minutes after receiving the vaccine.  No adverse reactions were noted.        Subject was provided with all required materials: diary card, memory aide, thermometer, written instructions for [employer], ruler (if in reactogenicity sub set), and stool kit with written instructions.  Detailed training was given to patient regarding how to complete diary, how to collect stool (if applicable), how to activate clincard, and how to contact site if patient has any questions. Subject was given a schedule of upcoming visits and will return in 7 days for their 2nd vaccination.  Patient left office in stable condition, all questions answered, and given contact information in case of significant changes in health or questions.    Prior to admission, Pt was ambulating independently with a straight cane ; pt sustained right  shoulder fracture due to fall PTA. Presently , pt needs more assistance with self care tasks and functional mobility. The scale below depicts a picture of the pt's current level of functioning. Barthel Index: Feeding Score___5___, Bathing Score____0__, Grooming Score___0__, Dressing Score___0__, Bowel Score___0__, Bladder Score____0__, Toilet Score____0_, Transfer Score__5____, Mobility Score____0_, Stairs Score___0__, Total Score_10/100____.

## 2018-01-09 ENCOUNTER — CLINICAL SUPPORT (OUTPATIENT)
Dept: REHABILITATION | Facility: HOSPITAL | Age: 73
End: 2018-01-09
Attending: PHYSICIAN ASSISTANT
Payer: MEDICARE

## 2018-01-09 DIAGNOSIS — M25.60 RESTRICTION OF JOINT MOTION: ICD-10-CM

## 2018-01-09 DIAGNOSIS — M67.01 HEEL CORD TIGHTNESS, RIGHT: ICD-10-CM

## 2018-01-09 DIAGNOSIS — S82.202F TYPE III OPEN FRACTURE OF LEFT TIBIA AND FIBULA WITH ROUTINE HEALING, SUBSEQUENT ENCOUNTER: Primary | ICD-10-CM

## 2018-01-09 DIAGNOSIS — M25.673 DECREASED RANGE OF MOTION OF ANKLE: ICD-10-CM

## 2018-01-09 DIAGNOSIS — R29.898 RIGHT LEG WEAKNESS: ICD-10-CM

## 2018-01-09 DIAGNOSIS — R26.89 BALANCE PROBLEMS: ICD-10-CM

## 2018-01-09 DIAGNOSIS — Z74.09 IMPAIRED MOBILITY: ICD-10-CM

## 2018-01-09 DIAGNOSIS — S82.402F TYPE III OPEN FRACTURE OF LEFT TIBIA AND FIBULA WITH ROUTINE HEALING, SUBSEQUENT ENCOUNTER: Primary | ICD-10-CM

## 2018-01-09 PROCEDURE — 97140 MANUAL THERAPY 1/> REGIONS: CPT | Mod: PN | Performed by: PHYSICAL THERAPIST

## 2018-01-09 PROCEDURE — 97110 THERAPEUTIC EXERCISES: CPT | Mod: PN | Performed by: PHYSICAL THERAPIST

## 2018-01-09 NOTE — PROGRESS NOTES
"                                          Physical Therapy Daily Note           Name: Janusz Montgomery Jr.  Clinic Number: [id number]  Date of Treatment: 01/09/2018   Diagnosis:   Encounter Diagnoses   Name Primary?    Type III open fracture of left tibia and fibula with routine healing, subsequent encounter Yes    Balance problems     Decreased range of motion of ankle     Impaired mobility     Right leg weakness     Heel cord tightness, right     Restriction of joint motion        Time in: 1505  Time Out:1625  Total Treatment Time: 80  VISITS / PURDY: 13/20 - 12/31/207  BOLD=INDICATES ACTIVITIES PERFORMED.      Subjective  Patient states there is no pain int he ankle / leg. Says MD said for him to gradually increase the weight bearing on the leg.   Pain level - 0/10 reported.     Objective    Treatment: Patient  was instructed in and performed therapeutic exercises to develop strength, ROM, flexibility, balance and joint mobility. Patient performed therapeutic exercises consisting of the following      Leg press   Recumbent bike  Upright bike   UE ergometer  Treadmill   Elliptical 1-2' - endurance compromised.       THERAPEUTIC EXERCISE 1-1 PT = 30'   SUPINE   -heel cord stretch 5'   -DF/PF x50 knee flexed at 30   -inver/ever x50   -circles x40 ea  -ABCs   -posterioir tib and peroneal stretch 30" x 4 ea. W/strap     SIDELYING  -eversion / inversion x30    PRONE    STANDING.  -gastroc stretch 2'  -soleus stretch 2'   -Toe stretch 2'  -heel raises x20  =SL stand on airex  =SL stand on airex with fwd lean and back lean.   SITTING    -toe curls x20  -toes stretch 1' x 2   -prostretch 2'  -inversion x20  -DF knee flexed / knee extended x20ea  -anterior tib stretch 30" x 4 with DF / PF x15   +soleus stretch 2'   -self stretch ( ant tib, peroneal, post tib ) 10" x 12 ea       MANUAL THERAPY: manual mobs at the  sub talar joint and tibiotalar joint supine.  1-1 = PT 7'   MODALITY  DIRECT EDUCATION:    Patient was issued " HEP        Assessment    Activities completed as noted. He did not encounter any difficulty with weight shifting. Progressing with stability training .   Medical Necessity is demonstrated by:   Unable/ limited ability  to participate in daily activities  Limited mobility, weight bearing status, diminished neuromuscular response and weakness limits function of effected part for some activities, Requires skilled supervision to complete and progress treatment interventions and HEP.  Pt demo good understanding of the education provided. Patient demonstrated good return demonstration of activities.Patient's tolerance to treatment was good. Patient will continue to benefit from skilled PTintervention.Patient is making progress towards established goals.  New/Revised goals: no  Continue with established Plan of Care towards PT goals.   CMS Impairment/Limitation/Restriction for FOTO Lower Leg (w/o Knee) Survey  Status Limitation G-Code CMS Severity Modifier  Intake 30% 70%  Predicted 42% 58% Goal Status+ CK - At least 40 percent but less than 60 percent  12/1/2017 41% 59% Current Status CK - At least 40 percent but less than 60 percent  D/C Status CK **only report if this is discharge survey  +Based on FOTO predicted change score  * Mean, Risk Adjusted, Intake Composite FS measures from FOTO aggregate database.  ** As indicated by the ICF assignments to the survey items in the FOTO survey used.  [facility] [place]  FUNCTIONAL STATUS SUMMARY (11/14/2017)  Patient: Janusz Montgomery Jr. HATFIELD ([id number]) Primary Body Part: Lower Leg (w/o Knee) Initial DOS: 11/14/2017  Produced and © [id number] by  [employer]    PLAN     Certification Period: 11/14/2017 to 2/14/2018  Recommended Treatment Plan: 2 times per week for 12 weeks: Manual Therapy, Neuromuscular Re-ed, Patient Education and Therapeutic Exercise  Other Recommendations: modalities PRN   Pt may be seen by PTA as part of the  rehabilitation team.      Therapist: Yusuf Farooq PT

## 2018-01-11 ENCOUNTER — PATIENT MESSAGE (OUTPATIENT)
Dept: FAMILY MEDICINE | Facility: CLINIC | Age: 73
End: 2018-01-11

## 2018-01-12 ENCOUNTER — PATIENT MESSAGE (OUTPATIENT)
Dept: FAMILY MEDICINE | Facility: CLINIC | Age: 73
End: 2018-01-12

## 2018-01-12 DIAGNOSIS — F41.8 MIXED ANXIETY AND DEPRESSIVE DISORDER: ICD-10-CM

## 2018-01-12 DIAGNOSIS — Z20.828 EXPOSURE TO THE FLU: Primary | ICD-10-CM

## 2018-01-12 RX ORDER — OSELTAMIVIR PHOSPHATE 75 MG/1
75 CAPSULE ORAL DAILY
Qty: 10 CAPSULE | Refills: 0 | Status: SHIPPED | OUTPATIENT
Start: 2018-01-12 | End: 2018-02-07 | Stop reason: ALTCHOICE

## 2018-01-12 RX ORDER — VENLAFAXINE 75 MG/1
150 TABLET ORAL 2 TIMES DAILY
Qty: 360 TABLET | Refills: 0 | Status: SHIPPED | OUTPATIENT
Start: 2018-01-12 | End: 2018-02-07 | Stop reason: SDUPTHER

## 2018-01-17 ENCOUNTER — CLINICAL SUPPORT (OUTPATIENT)
Dept: ELECTROPHYSIOLOGY | Facility: CLINIC | Age: 73
End: 2018-01-17
Attending: INTERNAL MEDICINE
Payer: MEDICARE

## 2018-01-17 ENCOUNTER — OFFICE VISIT (OUTPATIENT)
Dept: CARDIOLOGY | Facility: CLINIC | Age: 73
End: 2018-01-17
Payer: MEDICARE

## 2018-01-17 ENCOUNTER — TELEPHONE (OUTPATIENT)
Dept: ELECTROPHYSIOLOGY | Facility: CLINIC | Age: 73
End: 2018-01-17

## 2018-01-17 ENCOUNTER — PATIENT MESSAGE (OUTPATIENT)
Dept: CARDIOLOGY | Facility: CLINIC | Age: 73
End: 2018-01-17

## 2018-01-17 ENCOUNTER — DOCUMENTATION ONLY (OUTPATIENT)
Dept: ELECTROPHYSIOLOGY | Facility: CLINIC | Age: 73
End: 2018-01-17

## 2018-01-17 VITALS
BODY MASS INDEX: 49.45 KG/M2 | DIASTOLIC BLOOD PRESSURE: 64 MMHG | OXYGEN SATURATION: 95 % | HEART RATE: 89 BPM | SYSTOLIC BLOOD PRESSURE: 152 MMHG | WEIGHT: 315 LBS

## 2018-01-17 DIAGNOSIS — I25.10 CORONARY ARTERY DISEASE INVOLVING NATIVE CORONARY ARTERY OF NATIVE HEART WITHOUT ANGINA PECTORIS: Primary | ICD-10-CM

## 2018-01-17 DIAGNOSIS — Z95.818 STATUS POST PLACEMENT OF IMPLANTABLE LOOP RECORDER: ICD-10-CM

## 2018-01-17 DIAGNOSIS — I10 ESSENTIAL HYPERTENSION: ICD-10-CM

## 2018-01-17 DIAGNOSIS — G47.33 OBSTRUCTIVE SLEEP APNEA ON CPAP: ICD-10-CM

## 2018-01-17 DIAGNOSIS — E78.5 HYPERLIPIDEMIA, UNSPECIFIED HYPERLIPIDEMIA TYPE: ICD-10-CM

## 2018-01-17 DIAGNOSIS — L97.529 NON-PRESSURE CHRONIC ULCER OF OTHER PART OF LEFT FOOT WITH UNSPECIFIED SEVERITY: ICD-10-CM

## 2018-01-17 DIAGNOSIS — I48.0 PAF (PAROXYSMAL ATRIAL FIBRILLATION): ICD-10-CM

## 2018-01-17 DIAGNOSIS — E66.01 MORBID OBESITY WITH BMI OF 40.0-44.9, ADULT: ICD-10-CM

## 2018-01-17 PROCEDURE — 99999 PR PBB SHADOW E&M-EST. PATIENT-LVL III: CPT | Mod: PBBFAC,,, | Performed by: INTERNAL MEDICINE

## 2018-01-17 PROCEDURE — 99499 UNLISTED E&M SERVICE: CPT | Mod: S$GLB,,, | Performed by: INTERNAL MEDICINE

## 2018-01-17 PROCEDURE — 93298 REM INTERROG DEV EVAL SCRMS: CPT | Mod: S$GLB,,, | Performed by: INTERNAL MEDICINE

## 2018-01-17 PROCEDURE — 93299 LOOP RECORDER REMOTE: CPT | Mod: S$GLB,,, | Performed by: INTERNAL MEDICINE

## 2018-01-17 PROCEDURE — 99214 OFFICE O/P EST MOD 30 MIN: CPT | Mod: S$GLB,,, | Performed by: INTERNAL MEDICINE

## 2018-01-17 RX ORDER — ASPIRIN 81 MG/1
81 TABLET ORAL DAILY
Refills: 0 | Status: ON HOLD | COMMUNITY
Start: 2018-01-17 | End: 2020-01-30 | Stop reason: HOSPADM

## 2018-01-17 NOTE — PROGRESS NOTES
Subjective:    Patient ID:  Janusz Montgomery Jr. is a 72 y.o. male who presents for evaluation of No chief complaint on file.      HPI   previous history:  Here to establish care for history of CAD and paroxysmal atrial fibrillation.  He previously seen cardiology and electrophysiology at [facility].  He is a loop recorder implanted that shows basically normal sinus rhythm on multiple interrogations.  He only is on aspirin for OAC has been stable from her arrhythmia standpoint.  He denies any chest pain and has a history of prior PCI.  He was originally scheduled for a stress test last year but went into the hospital with several issues including lower extremity wounds which she is now receiving rehabilitation.  Mainly complains of shortness of breath on heavier exertion but relieved with rest.  He notices more progressive symptoms lately getting short winded walking from the parking lot to the office.  Usually everything calms down after he rests.  He denies any chest pain or palpitations.  He's express no PND, orthopnea or lower edema.  He's not had any dizziness, presyncope or syncope.    Today:  Here for follow-up of CAD and paroxysmal atrial fibrillation.  He mainly complains of shortness of breath and swelling with fluid accumulation.  He does try and treat himself and takes extra Lasix which dehydrates him.  He's had admission for this before with acute renal failure.  We discussed that he needs to prevent the problem by not taking in sodium.  He does have sleep apnea follow-up.  He denies any current PND, orthopnea and has stable abdominal swelling.  He denies any dizziness, presyncope or syncope.  He mainly is limited by recent leg fracture.    Review of Systems   Constitution: Negative.   HENT: Negative.    Eyes: Negative.    Cardiovascular: Positive for dyspnea on exertion. Negative for chest pain, irregular heartbeat, leg swelling, near-syncope, orthopnea, palpitations, paroxysmal nocturnal dyspnea and  syncope.   Respiratory: Positive for shortness of breath.    Skin: Negative.    Musculoskeletal: Negative.    Gastrointestinal: Negative for abdominal pain, constipation and diarrhea.   Genitourinary: Negative for dysuria.   Neurological: Negative for dizziness.   Psychiatric/Behavioral: Negative.         Objective:    Physical Exam   Constitutional: He is oriented to person, place, and time. He appears well-developed and well-nourished. No distress.   HENT:   Head: Normocephalic and atraumatic.   Eyes: Conjunctivae and EOM are normal. Pupils are equal, round, and reactive to light.   Neck: Normal range of motion. Neck supple. No thyromegaly present.   Cardiovascular: Normal rate, regular rhythm and normal heart sounds.    No murmur heard.  Pulmonary/Chest: Effort normal and breath sounds normal. No respiratory distress. He has no wheezes. He has no rales. He exhibits no tenderness.   Abdominal: Soft. Bowel sounds are normal.   Musculoskeletal: He exhibits no edema.   Neurological: He is alert and oriented to person, place, and time.   Skin: Skin is warm and dry.   Psychiatric: He has a normal mood and affect. His behavior is normal.         ekg normal sinus rhythm    Echo: 2-17  CONCLUSIONS     1 - Normal left ventricular systolic function (EF 55-60%).     2 - Normal left ventricular diastolic function.     3 - Normal right ventricular systolic function .     NST:  Impression: ABNORMAL MYOCARDIAL PERFUSION  1. There is evidence for mild myocardial ischemia in the anterior wall of the left ventricle. Specificity is reduced secondary to body habitus.   2. The perfusion scan is free of evidence for myocardial injury.   3. There is a moderate intensity fixed defect in the inferior wall of the left ventricle, secondary to diaphragm attenuation.   4. Resting wall motion is physiologic.   5. Visually estimated LV function is normal.   6. The ventricular volumes are normal at rest and stress.   7. The extracardiac  distribution of radioactivity is normal.   8. When compared to the previous study from 01/14/2009, possible anterior ischemia (vs attenuation artifact) and fixed inferior defect (suggestive of attenuation artifact) now present.  Consider PET-stress for further evaluation if clinically indicated.  Assessment:       1. Coronary artery disease involving native coronary artery of native heart without angina pectoris s/p RCA stent    2. PAF (paroxysmal atrial fibrillation)    3. Essential hypertension    4. Obstructive sleep apnea on CPAP    5. Non-pressure chronic ulcer of other part of left foot with unspecified severity    6. Morbid obesity with BMI of 40.0-44.9, adult    7. Hyperlipidemia, unspecified hyperlipidemia type         Plan:       -Continue med therapy  -Emphasized sodium restriction  -Stable normal sinus rhythm today and on loop recorder interrogation  -Currently only on aspirin therapy for OAC (*understands risks/benefits of stroke risk and prefers aspirin alone)  -Follow-up with rehabilitation and wound care  -Consider PAD screen at next visit    Return to clinic in 3 months

## 2018-01-17 NOTE — TELEPHONE ENCOUNTER
Received a loop alert showing AFw/ RVR. 4:30am lasting 4 hrs 28 mins. He was and still is feeling SOB. He went Dr Dr Smith his cardiologist today. He had a stress test. Was told to decrease salt and ecersize more.   I asked him to send a full transmission when he gets home so we can see the episode better. Pt verbalized understanding.    Addendum:  Spoke with patient about his episode of AF. I had a long discussion with the patient about the pathophysiology and risks of atrial fibrillation and its basic pathophysiology, including its health implications and treatment options with the patient. Specifically, I addressed the need for CVA (stroke) prophylaxis with aspirin versus oral anticoagulation (warfarin vs DOACs, discussed bleeding risks, irreversibility of DOACs vs Vitamin K/plasma reversal of warfarin, and need to come to the ER for any head trauma for CT scanning even if asymptomatic).  I recommended anticoagulation due to his [id number] score of 3. He preferred eliquis after discussing options. Rx sent to his pharmacy. Will request follow-up with me in clinic in 1-2 months.

## 2018-01-18 ENCOUNTER — CLINICAL SUPPORT (OUTPATIENT)
Dept: REHABILITATION | Facility: HOSPITAL | Age: 73
End: 2018-01-18
Attending: PHYSICIAN ASSISTANT
Payer: MEDICARE

## 2018-01-18 DIAGNOSIS — Z74.09 IMPAIRED MOBILITY: ICD-10-CM

## 2018-01-18 DIAGNOSIS — R29.898 RIGHT LEG WEAKNESS: ICD-10-CM

## 2018-01-18 DIAGNOSIS — R26.89 BALANCE PROBLEMS: ICD-10-CM

## 2018-01-18 DIAGNOSIS — M67.01 HEEL CORD TIGHTNESS, RIGHT: ICD-10-CM

## 2018-01-18 DIAGNOSIS — M25.673 DECREASED RANGE OF MOTION OF ANKLE: ICD-10-CM

## 2018-01-18 DIAGNOSIS — S82.202F TYPE III OPEN FRACTURE OF LEFT TIBIA AND FIBULA WITH ROUTINE HEALING, SUBSEQUENT ENCOUNTER: Primary | ICD-10-CM

## 2018-01-18 DIAGNOSIS — S82.402F TYPE III OPEN FRACTURE OF LEFT TIBIA AND FIBULA WITH ROUTINE HEALING, SUBSEQUENT ENCOUNTER: Primary | ICD-10-CM

## 2018-01-18 PROCEDURE — 97110 THERAPEUTIC EXERCISES: CPT | Mod: PN | Performed by: PHYSICAL THERAPIST

## 2018-01-18 NOTE — PROGRESS NOTES
Loop Event - AF   Received: 1/17/2018 2:39 PM   Message Contents   Eugenia Palacio MD   Cc: Ros Palacio,     ILR implanted for suspected AF.  Patient with AF episode detected 1/16/18 at 04:37 AM lasting 4 hours 28 mins.  Patient's only reported symptom was SOB, but reports this has been going on/off for about a year.  Takes  mg po daily.     Saw his cardiologist, Dr. Smith, today for follow up of stress test done 12/15/2017.     Will place strip of episode in your door to view.     Thanks,   Eugenia

## 2018-01-18 NOTE — PROGRESS NOTES
"                                          Physical Therapy Daily Note           Name: Janusz Montgomery Jr.  Clinic Number: [id number]  Date of Treatment: 01/18/2018   Diagnosis:   Encounter Diagnoses   Name Primary?    Type III open fracture of left tibia and fibula with routine healing, subsequent encounter Yes    Balance problems     Impaired mobility     Decreased range of motion of ankle     Heel cord tightness, right     Right leg weakness        Time in: 1415  Time Out:1525  Total Treatment Time: 70  VISITS / PURDY: 14/20 - 12/31/207  BOLD=INDICATES ACTIVITIES PERFORMED.      Subjective  Patient states the ankle is stiff today. There is no pain.       Pain level - 0/10 reported.     Objective    Treatment: Patient  was instructed in and performed therapeutic exercises to develop strength, ROM, flexibility, balance and joint mobility. Patient performed therapeutic exercises consisting of the following      Leg press   Recumbent bike  Upright bike   UE ergometer  Treadmill   Elliptical 1-2' - endurance compromised.       THERAPEUTIC EXERCISE 1-1 PT = 30'   SUPINE   -heel cord stretch 5'   -DF/PF x50 knee flexed at 30   -inver/ever x50   -circles x40 ea  -ABCs   -posterioir tib and peroneal stretch 30" x 4 ea. W/strap     SIDELYING  -eversion / inversion x30    PRONE    STANDING.  -gastroc stretch 2'  -soleus stretch 2'   -Toe stretch 2'  -heel raises x20  -SL stand on airex  =SL stand on airex with fwd lean and back lean.     SITTING  -toe curls x20  -toes stretch 1' x 2   -prostretch 2'  -inversion x20  -DF knee flexed / knee extended x20ea  -anterior tib stretch 30" x 4 with DF / PF x15   +soleus stretch 2'   -self stretch ( ant tib, peroneal, post tib ) 10" x 12 ea       MANUAL THERAPY: manual mobs at the  sub talar joint and tibiotalar joint supine.  = not performed.   MODALITY  DIRECT EDUCATION:    Patient was issued HEP        Assessment    The patient was somewhat apprehensive to unilateral standing " however he was able to complete without pain. He requires touchdown assistance with the other foot for stability. Continues to maintain heel cord tightness however he recognizes improvement in flexibility and mobility although still restricted.   Medical Necessity is demonstrated by:   Unable/ limited ability  to participate in daily activities  Limited mobility, weight bearing status, diminished neuromuscular response and weakness limits function of effected part for some activities, Requires skilled supervision to complete and progress treatment interventions and HEP.  Pt demo good understanding of the education provided. Patient demonstrated good return demonstration of activities.Patient's tolerance to treatment was good. Patient will continue to benefit from skilled PTintervention.Patient is making progress towards established goals.  New/Revised goals: no  Continue with established Plan of Care towards PT goals.   CMS Impairment/Limitation/Restriction for FOTO Lower Leg (w/o Knee) Survey  Status Limitation G-Code CMS Severity Modifier  Intake 30% 70%  Predicted 42% 58% Goal Status+ CK - At least 40 percent but less than 60 percent  12/1/2017 41% 59% Current Status CK - At least 40 percent but less than 60 percent  D/C Status CK **only report if this is discharge survey  +Based on FOTO predicted change score  * Mean, Risk Adjusted, Intake Composite FS measures from FOTO aggregate database.  ** As indicated by the ICF assignments to the survey items in the FOTO survey used.  [facility] [place]  FUNCTIONAL STATUS SUMMARY (11/14/2017)  Patient: Janusz Montgomery Jr. HATFIELD ([id number]) Primary Body Part: Lower Leg (w/o Knee) Initial DOS: 11/14/2017  Produced and © [id number] by  [employer]    PLAN     Certification Period: 11/14/2017 to 2/14/2018  Recommended Treatment Plan: 2 times per week for 12 weeks: Manual Therapy, Neuromuscular Re-ed, Patient Education and Therapeutic  Exercise  Other Recommendations: modalities PRN   Pt may be seen by PTA as part of the rehabilitation team.      Therapist: Yusuf Farooq PT

## 2018-01-23 ENCOUNTER — CLINICAL SUPPORT (OUTPATIENT)
Dept: REHABILITATION | Facility: HOSPITAL | Age: 73
End: 2018-01-23
Attending: PHYSICIAN ASSISTANT
Payer: MEDICARE

## 2018-01-23 DIAGNOSIS — Z74.09 IMPAIRED MOBILITY: ICD-10-CM

## 2018-01-23 DIAGNOSIS — R26.89 BALANCE PROBLEMS: ICD-10-CM

## 2018-01-23 DIAGNOSIS — M25.673 DECREASED RANGE OF MOTION OF ANKLE: ICD-10-CM

## 2018-01-23 DIAGNOSIS — M67.01 HEEL CORD TIGHTNESS, RIGHT: ICD-10-CM

## 2018-01-23 DIAGNOSIS — M25.60 RESTRICTION OF JOINT MOTION: ICD-10-CM

## 2018-01-23 DIAGNOSIS — S82.402F TYPE III OPEN FRACTURE OF LEFT TIBIA AND FIBULA WITH ROUTINE HEALING, SUBSEQUENT ENCOUNTER: Primary | ICD-10-CM

## 2018-01-23 DIAGNOSIS — S82.202F TYPE III OPEN FRACTURE OF LEFT TIBIA AND FIBULA WITH ROUTINE HEALING, SUBSEQUENT ENCOUNTER: Primary | ICD-10-CM

## 2018-01-23 DIAGNOSIS — R29.898 RIGHT LEG WEAKNESS: ICD-10-CM

## 2018-01-23 PROCEDURE — G8979 MOBILITY GOAL STATUS: HCPCS | Mod: CK,PN | Performed by: PHYSICAL THERAPIST

## 2018-01-23 PROCEDURE — G8978 MOBILITY CURRENT STATUS: HCPCS | Mod: CL,PN | Performed by: PHYSICAL THERAPIST

## 2018-01-23 PROCEDURE — 97110 THERAPEUTIC EXERCISES: CPT | Mod: PN | Performed by: PHYSICAL THERAPIST

## 2018-01-23 RX ORDER — GABAPENTIN 300 MG/1
CAPSULE ORAL
Qty: 90 CAPSULE | Refills: 11 | Status: SHIPPED | OUTPATIENT
Start: 2018-01-23 | End: 2018-11-16

## 2018-01-23 NOTE — PROGRESS NOTES
"                                          Physical Therapy Daily Note           Name: Janusz Montgomery Jr.  Clinic Number: [id number]  Date of Treatment: 01/23/2018   Diagnosis:   Encounter Diagnoses   Name Primary?    Type III open fracture of left tibia and fibula with routine healing, subsequent encounter Yes    Balance problems     Decreased range of motion of ankle     Heel cord tightness, right     Restriction of joint motion     Impaired mobility     Right leg weakness        Time in: 1415  Time Out:1530  Total Treatment Time: 75  VISITS / PURDY: 15/20 - 12/31/207  BOLD=INDICATES ACTIVITIES PERFORMED.      Subjective  Patient states the foot just feels numb and the ankle feels normal. No pain.     Pain level - 0/10 reported.     Objective    Joint mobility assessment   Tibiotalar (talocrual) - soft tissue end point with improved quality but no increased motion.   Subtalar - abrupt end point with improved quality and no change in quantity of motion.   Talonavicular and Calcaneocuboid- some improved quality medially and slight change laterally   Treatment: Patient  was instructed in and performed therapeutic exercises to develop strength, ROM, flexibility, balance and joint mobility. Patient performed therapeutic exercises consisting of the following      Leg press   Recumbent bike  Upright bike   UE ergometer  Treadmill   Elliptical 1-2' - endurance compromised.       THERAPEUTIC EXERCISE 1-1 PT = 30'   SUPINE   -heel cord stretch 5'   -DF/PF x50 knee flexed at 30   -inver/ever x50   -circles x40 ea  -ABCs   -posterioir tib and peroneal stretch 30" x 4 ea. W/strap     SIDELYING  -eversion / inversion x30    PRONE    STANDING.  -gastroc stretch 2'  -soleus stretch 2'   -Toe stretch 2'  -heel raises x20  -SL stand on airex  =SL stand on airex with fwd lean and back lean.     SITTING  -toe curls x20  -toes stretch 1' x 2   -prostretch 2'  -inversion x20  -DF knee flexed / knee extended x20ea  -anterior tib " "stretch 30" x 4 with DF / PF x15   +soleus stretch 2'   -self stretch ( ant tib, peroneal, post tib ) 10" x 12 ea       MANUAL THERAPY: manual mobs at the  sub talar joint and tibiotalar joint supine.  = not performed.   MODALITY  DIRECT EDUCATION:    Patient was issued HEP        Assessment    Patient demonstrates improvement in standing tolerance without the boot. There is no pain with CC activity. Not able to maintain unilateral stability with out support. Flexibility slightly improved. Progress with CC events. Also noted is the improvement in the quality of movement at the talocrual / subtalar joints with slight improvement in the joints of the mid-foot.   Medical Necessity is demonstrated by:   Unable/ limited ability  to participate in daily activities  Limited mobility, weight bearing status, diminished neuromuscular response and weakness limits function of effected part for some activities, Requires skilled supervision to complete and progress treatment interventions and HEP.  Pt demo good understanding of the education provided. Patient demonstrated good return demonstration of activities.Patient's tolerance to treatment was good. Patient will continue to benefit from skilled PTintervention.Patient is making progress towards established goals.  New/Revised goals: no  Continue with established Plan of Care towards PT goals.   CMS Impairment/Limitation/Restriction for FOTO Lower Leg (w/o Knee) Survey  Status Limitation G-Code CMS Severity Modifier  Intake 30% 70%  Predicted 42% 58% Goal Status+ CK - At least 40 percent but less than 60 percent  12/1/2017 41% 59%  1/23/2018 38% 62% Current Status CL - At least 60 percent but less than 80 percent  D/C Status CL **only report if this is discharge survey  +Based on FOTO predicted change score  *      PLAN     Certification Period: 11/14/2017 to 2/14/2018  Recommended Treatment Plan: 2 times per week for 12 weeks: Manual Therapy, Neuromuscular Re-ed, Patient " Education and Therapeutic Exercise  Other Recommendations: modalities PRN   Pt may be seen by PTA as part of the rehabilitation team.      Therapist: Yusuf Farooq PT

## 2018-01-24 RX ORDER — ATORVASTATIN CALCIUM 40 MG/1
40 TABLET, FILM COATED ORAL DAILY
Qty: 90 TABLET | Refills: 0 | Status: SHIPPED | OUTPATIENT
Start: 2018-01-24 | End: 2018-05-10 | Stop reason: SDUPTHER

## 2018-01-25 ENCOUNTER — CLINICAL SUPPORT (OUTPATIENT)
Dept: REHABILITATION | Facility: HOSPITAL | Age: 73
End: 2018-01-25
Attending: PHYSICIAN ASSISTANT
Payer: MEDICARE

## 2018-01-25 DIAGNOSIS — S82.202F TYPE III OPEN FRACTURE OF LEFT TIBIA AND FIBULA WITH ROUTINE HEALING, SUBSEQUENT ENCOUNTER: Primary | ICD-10-CM

## 2018-01-25 DIAGNOSIS — S82.402F TYPE III OPEN FRACTURE OF LEFT TIBIA AND FIBULA WITH ROUTINE HEALING, SUBSEQUENT ENCOUNTER: Primary | ICD-10-CM

## 2018-01-25 DIAGNOSIS — Z74.09 IMPAIRED MOBILITY: ICD-10-CM

## 2018-01-25 DIAGNOSIS — M25.673 DECREASED RANGE OF MOTION OF ANKLE: ICD-10-CM

## 2018-01-25 DIAGNOSIS — R29.898 RIGHT LEG WEAKNESS: ICD-10-CM

## 2018-01-25 DIAGNOSIS — M25.60 RESTRICTION OF JOINT MOTION: ICD-10-CM

## 2018-01-25 DIAGNOSIS — M67.01 HEEL CORD TIGHTNESS, RIGHT: ICD-10-CM

## 2018-01-25 DIAGNOSIS — R26.89 BALANCE PROBLEMS: ICD-10-CM

## 2018-01-25 PROCEDURE — 97110 THERAPEUTIC EXERCISES: CPT | Mod: PN | Performed by: PHYSICAL THERAPIST

## 2018-01-25 NOTE — PROGRESS NOTES
"                                          Physical Therapy Daily Note           Name: Janusz Montgomery Jr.  Clinic Number: [id number]  Date of Treatment: 01/25/2018   Diagnosis:   Encounter Diagnoses   Name Primary?    Type III open fracture of left tibia and fibula with routine healing, subsequent encounter Yes    Balance problems     Decreased range of motion of ankle     Heel cord tightness, right     Impaired mobility     Restriction of joint motion     Right leg weakness        Time in: 1005  Time Out:1135  Total Treatment Time: 90  VISITS / PURDY: 16/20 - 12/31/207  BOLD=INDICATES ACTIVITIES PERFORMED.      Subjective  Patient states the ankle is feeling fine and there is no pain. He occasionally walks in the house, not far, maybe one room withut the boot. There is no pain that he encounters. Says he feels the ankle is more mobile and looser.     Pain level - 0/10 reported.     Objective    Ankle   Left   Pain/Dysfunction with Movement     AROM PROM MMT     Plantarflexion 20  20 30  30 2-/5 Limited by weight bearing    Dorsiflexion  Knee extend.    Knee flexed        5  8  8  10    8  15  15  15 2+/5  Joint mobility and tissue extensibility limitation    Inversion 5  15 10  20 2+/5 Joint mobility and tissue extensibility limitation    Eversion 5  20 8  30 2+/5  Joint mobility and tissue extensibility limitation       Girth:                        L               1/25/2018     2" sup.med mall       10.75         10.25  Figure 8                    24              24  Mid foot                     11              11    Joint mobility assessment   Tibiotalar (talocrual) - soft tissue end point with improved quality but no increased motion.   Subtalar - abrupt end point with improved quality and no change in quantity of motion.   Talonavicular and Calcaneocuboid- some improved quality medially and slight change laterally   Treatment: Patient  was instructed in and performed therapeutic exercises to develop strength, " "ROM, flexibility, balance and joint mobility. Patient performed therapeutic exercises consisting of the following      Leg press   Recumbent bike  Upright bike   UE ergometer  Treadmill   Elliptical 1-2' - endurance compromised.       THERAPEUTIC EXERCISE 1-1 PT = 90'    SUPINE   -heel cord stretch 5'   -DF/PF x50 knee flexed at 30   -inver/ever x50   -circles x40 ea  -ABCs   -posterioir tib and peroneal stretch 30" x 4 ea. W/strap     SIDELYING  -eversion / inversion x30    PRONE    STANDING.  -gastroc stretch 2'  -soleus stretch 2'   -Toe stretch 2'  -heel raises x20  -SL stand on airex 15" x6   =SL stand on airex with fwd lean and back lean.     SITTING  -toe curls      x20  -toes stretch 1' x 2   -prostretch 2'  -inversion   x20  -DF knee flexed / knee extended x20ea  -anterior tib stretch 30" x 4 with DF / PF x15   +soleus stretch 2'   -self stretch ( ant tib, peroneal, post tib ) 10" x 12 ea       MANUAL THERAPY: manual mobs at the  sub talar joint and tibiotalar joint supine.  = performed for 10 minutes 1-1 PT   MODALITY = ice at EOS 12'   DIRECT EDUCATION:    Patient was issued HEP        Assessment    Patient is responding to manual mobs favorably. Some noted increased mobility at the sub-talar and improved achilles flexibility. Although motions remain compromised there is improvement in available range. Progressing with static  standing stability. No change in ankle edema.   Medical Necessity is demonstrated by:   Unable/ limited ability  to participate in daily activities  Limited mobility, weight bearing status, diminished neuromuscular response and weakness limits function of effected part for some activities, Requires skilled supervision to complete and progress treatment interventions and HEP.  Pt demo good understanding of the education provided. Patient demonstrated good return demonstration of activities.Patient's tolerance to treatment was good. Patient will continue to benefit from skilled " PTintervention.Patient is making progress towards established goals.  New/Revised goals: no  Continue with established Plan of Care towards PT goals.   CMS Impairment/Limitation/Restriction for FOTO Lower Leg (w/o Knee) Survey  Status Limitation G-Code CMS Severity Modifier  Intake 30% 70%  Predicted 42% 58% Goal Status+ CK - At least 40 percent but less than 60 percent  12/1/2017 41% 59%  1/23/2018 38% 62% Current Status CL - At least 60 percent but less than 80 percent  D/C Status CL **only report if this is discharge survey  +Based on FOTO predicted change score  *      PLAN     Certification Period: 11/14/2017 to 2/14/2018  Recommended Treatment Plan: 2 times per week for 12 weeks: Manual Therapy, Neuromuscular Re-ed, Patient Education and Therapeutic Exercise  Other Recommendations: modalities PRN   Pt may be seen by PTA as part of the rehabilitation team.      Therapist: Yusuf Farooq, PT

## 2018-01-30 ENCOUNTER — CLINICAL SUPPORT (OUTPATIENT)
Dept: REHABILITATION | Facility: HOSPITAL | Age: 73
End: 2018-01-30
Attending: PHYSICIAN ASSISTANT
Payer: MEDICARE

## 2018-01-30 DIAGNOSIS — R26.89 BALANCE PROBLEMS: ICD-10-CM

## 2018-01-30 DIAGNOSIS — S82.202F TYPE III OPEN FRACTURE OF LEFT TIBIA AND FIBULA WITH ROUTINE HEALING, SUBSEQUENT ENCOUNTER: Primary | ICD-10-CM

## 2018-01-30 DIAGNOSIS — R29.898 RIGHT LEG WEAKNESS: ICD-10-CM

## 2018-01-30 DIAGNOSIS — S82.402F TYPE III OPEN FRACTURE OF LEFT TIBIA AND FIBULA WITH ROUTINE HEALING, SUBSEQUENT ENCOUNTER: Primary | ICD-10-CM

## 2018-01-30 DIAGNOSIS — M67.01 HEEL CORD TIGHTNESS, RIGHT: ICD-10-CM

## 2018-01-30 DIAGNOSIS — Z74.09 IMPAIRED MOBILITY: ICD-10-CM

## 2018-01-30 DIAGNOSIS — M25.60 RESTRICTION OF JOINT MOTION: ICD-10-CM

## 2018-01-30 DIAGNOSIS — M25.673 DECREASED RANGE OF MOTION OF ANKLE: ICD-10-CM

## 2018-01-30 PROCEDURE — 97110 THERAPEUTIC EXERCISES: CPT | Mod: PN | Performed by: PHYSICAL THERAPIST

## 2018-01-30 NOTE — PROGRESS NOTES
"                                          Physical Therapy Daily Note           Name: Janusz Montgomery Jr.  Clinic Number: [id number]  Date of Treatment: 01/30/2018   Diagnosis:   Encounter Diagnoses   Name Primary?    Type III open fracture of left tibia and fibula with routine healing, subsequent encounter Yes    Balance problems     Impaired mobility     Decreased range of motion of ankle     Heel cord tightness, right     Restriction of joint motion     Right leg weakness        Time in: 1420  Time Out:1510  Total Treatment Time: 50  VISITS / PURDY: 17/20 - 12/31/207  BOLD=INDICATES ACTIVITIES PERFORMED.      Subjective  Patient states that he stopped using the walker today and is not using the WC at home     Pain level - 0/10 reported.     Objective    1/25/2018 -   Ankle   Left   Pain/Dysfunction with Movement     AROM PROM MMT     Plantarflexion 20  20 30  30 2-/5 Limited by weight bearing    Dorsiflexion  Knee extend.    Knee flexed        5  8  8  10    8  15  15  15 2+/5  Joint mobility and tissue extensibility limitation    Inversion 5  15 10  20 2+/5 Joint mobility and tissue extensibility limitation    Eversion 5  20 8  30 2+/5  Joint mobility and tissue extensibility limitation       Girth:                        L               1/25/2018     2" sup.med mall       10.75         10.25  Figure 8                    24              24  Mid foot                     11              11    Joint mobility assessment   Tibiotalar (talocrual) - soft tissue end point with improved quality but no increased motion.   Subtalar - abrupt end point with improved quality and no change in quantity of motion.   Talonavicular and Calcaneocuboid- some improved quality medially and slight change laterally   Treatment: Patient  was instructed in and performed therapeutic exercises to develop strength, ROM, flexibility, balance and joint mobility. Patient performed therapeutic exercises consisting of the following      Leg " "press   Recumbent bike  Upright bike   UE ergometer  Treadmill   Elliptical 1-2' - endurance compromised.       THERAPEUTIC EXERCISE 1-1 PT =   30'  SUPINE   -heel cord stretch 5'   -DF/PF x50 knee flexed at 30   -inver/ever x50   -circles x40 ea  -ABCs   -posterioir tib and peroneal stretch 30" x 4 ea. W/strap     SIDELYING  -eversion / inversion x30    PRONE    STANDING.  -gastroc stretch 2'  -soleus stretch 2'   -Toe stretch 2'  -heel raises x20  -SL stand on airex 15" x6   =SL stand on airex with fwd lean and back lean.     SITTING  -toe curls      x20  -toes stretch 1' x 2   -prostretch 2'  -inversion   x20  -DF knee flexed / knee extended x20ea  -anterior tib stretch 30" x 4 with DF / PF x15   +soleus stretch 2'   -self stretch ( ant tib, peroneal, post tib ) 10" x 12 ea       MANUAL THERAPY: manual mobs at the  sub talar joint and tibiotalar joint supine.  = performed for 10 minutes 1-1 PT   MODALITY = ice at EOS 12'   DIRECT EDUCATION:    Patient was issued HEP        Assessment  Patient completed all activities as noted. He did not encounter difficulty or was challenged by pain during CC activities. Unilateral balance and stability are compromised and is not able to maintain stability without support at this time.   Medical Necessity is demonstrated by:   Unable/ limited ability  to participate in daily activities  Limited mobility, weight bearing status, diminished neuromuscular response and weakness limits function of effected part for some activities, Requires skilled supervision to complete and progress treatment interventions and HEP.  Pt demo good understanding of the education provided. Patient demonstrated good return demonstration of activities.Patient's tolerance to treatment was good. Patient will continue to benefit from skilled PTintervention.Patient is making progress towards established goals.  New/Revised goals: no  Continue with established Plan of Care towards PT goals.   CMS " Impairment/Limitation/Restriction for FOTO Lower Leg (w/o Knee) Survey  Status Limitation G-Code CMS Severity Modifier  Intake 30% 70%  Predicted 42% 58% Goal Status+ CK - At least 40 percent but less than 60 percent  12/1/2017 41% 59%  1/23/2018 38% 62% Current Status CL - At least 60 percent but less than 80 percent  D/C Status CL **only report if this is discharge survey  +Based on FOTO predicted change score  *      PLAN     Certification Period: 11/14/2017 to 2/14/2018  Recommended Treatment Plan: 2 times per week for 12 weeks: Manual Therapy, Neuromuscular Re-ed, Patient Education and Therapeutic Exercise  Other Recommendations: modalities PRN   Pt may be seen by PTA as part of the rehabilitation team.      Therapist: Yusuf Farooq, PT

## 2018-01-31 ENCOUNTER — TELEPHONE (OUTPATIENT)
Dept: ELECTROPHYSIOLOGY | Facility: CLINIC | Age: 73
End: 2018-01-31

## 2018-01-31 ENCOUNTER — PATIENT MESSAGE (OUTPATIENT)
Dept: ORTHOPEDICS | Facility: CLINIC | Age: 73
End: 2018-01-31

## 2018-01-31 NOTE — TELEPHONE ENCOUNTER
I called Ms. Montgomery back. She called and said her  has been having some arrhythmias. I asked her to have him send or she can send a manual full report. Ms. Montgomery said she will not be able to send this until about 2:30pm today. Waiting for report to come through. The technician that monitors the Loop recorders for [facility] will let me know if there is any issues that need to be brought to the doctors attention.

## 2018-01-31 NOTE — TELEPHONE ENCOUNTER
Called Ms Montgomery after receiving the manual transmission of her husbands loop. There were no new events or anything of alarm nature. Tried the home# and it stayed busy. LM on mbl #

## 2018-02-01 ENCOUNTER — CLINICAL SUPPORT (OUTPATIENT)
Dept: REHABILITATION | Facility: HOSPITAL | Age: 73
End: 2018-02-01
Attending: PHYSICIAN ASSISTANT
Payer: MEDICARE

## 2018-02-01 DIAGNOSIS — M67.01 HEEL CORD TIGHTNESS, RIGHT: ICD-10-CM

## 2018-02-01 DIAGNOSIS — Z74.09 IMPAIRED MOBILITY: ICD-10-CM

## 2018-02-01 DIAGNOSIS — M25.60 RESTRICTION OF JOINT MOTION: ICD-10-CM

## 2018-02-01 DIAGNOSIS — M25.673 DECREASED RANGE OF MOTION OF ANKLE: ICD-10-CM

## 2018-02-01 DIAGNOSIS — S82.202F TYPE III OPEN FRACTURE OF LEFT TIBIA AND FIBULA WITH ROUTINE HEALING, SUBSEQUENT ENCOUNTER: Primary | ICD-10-CM

## 2018-02-01 DIAGNOSIS — R29.898 RIGHT LEG WEAKNESS: ICD-10-CM

## 2018-02-01 DIAGNOSIS — R26.89 BALANCE PROBLEMS: ICD-10-CM

## 2018-02-01 DIAGNOSIS — S82.402F TYPE III OPEN FRACTURE OF LEFT TIBIA AND FIBULA WITH ROUTINE HEALING, SUBSEQUENT ENCOUNTER: Primary | ICD-10-CM

## 2018-02-01 PROCEDURE — 97110 THERAPEUTIC EXERCISES: CPT | Mod: PN | Performed by: PHYSICAL THERAPIST

## 2018-02-01 NOTE — PROGRESS NOTES
"                                          Physical Therapy Daily Note           Name: Janusz Montgomery Jr.  Clinic Number: [id number]  Date of Treatment: 02/01/2018   Diagnosis:   Encounter Diagnoses   Name Primary?    Type III open fracture of left tibia and fibula with routine healing, subsequent encounter Yes    Balance problems     Impaired mobility     Heel cord tightness, right     Decreased range of motion of ankle     Restriction of joint motion     Right leg weakness        Time in: 1410  Time Out:1510  Total Treatment Time: 60  VISITS / PURDY: 18/20 - 12/31/207  BOLD=INDICATES ACTIVITIES PERFORMED.      Subjective  Patient states the calf muscle is sore from doing the heel lifts. He is not having any pain at this time.     Pain level - 0/10 reported.     Objective    1/25/2018 -   Ankle   Left   Pain/Dysfunction with Movement     AROM PROM MMT     Plantarflexion 20  20 30  30 2-/5 Limited by weight bearing    Dorsiflexion  Knee extend.    Knee flexed        5  8  8  10    8  15  15  15 2+/5  Joint mobility and tissue extensibility limitation    Inversion 5  15 10  20 2+/5 Joint mobility and tissue extensibility limitation    Eversion 5  20 8  30 2+/5  Joint mobility and tissue extensibility limitation       Girth:                        L               1/25/2018     2" sup.med mall       10.75         10.25  Figure 8                    24              24  Mid foot                     11              11    Joint mobility assessment   Tibiotalar (talocrual) - soft tissue end point with improved quality but no increased motion.   Subtalar - abrupt end point with improved quality and no change in quantity of motion.   Talonavicular and Calcaneocuboid- some improved quality medially and slight change laterally   Treatment: Patient  was instructed in and performed therapeutic exercises to develop strength, ROM, flexibility, balance and joint mobility. Patient performed therapeutic exercises consisting of the " "following      Leg press   Recumbent bike  Upright bike   UE ergometer  Treadmill   Elliptical 1-2' - endurance compromised.       THERAPEUTIC EXERCISE 1-1 PT =   60'  SUPINE   -heel cord stretch 5'   -DF/PF x50 knee flexed at 30   -inver/ever x50   -circles x40 ea  -ABCs   -posterioir tib and peroneal stretch 30" x 4 ea. W/strap     SIDELYING  -eversion / inversion x30    PRONE    STANDING.  -gastroc stretch 2'  -soleus stretch 2'   -Toe stretch 2'  -heel raises x20  -SL stand on airex 15" x6 // bars   -SL stand airex 30" VICKI   =SL stand on airex with fwd lean and back lean.   -tandem stand 15 " x8       SITTING  -toe curls      x20  -toes stretch 1' x 2   -prostretch 2'  -inversion   x20  -DF knee flexed / knee extended x20ea  -anterior tib stretch 30" x 4 with DF / PF x15   +soleus stretch 2'   -self stretch ( ant tib, peroneal, post tib ) 10" x 12 ea       MANUAL THERAPY:   MODALITY =   DIRECT EDUCATION:    Patient was issued HEP        Assessment  The patient performed and completed all activities. He maintains challenges with unilateral balance. Progressing to standing / CC activities and reducing supine and seated exercises.   Medical Necessity is demonstrated by:   Unable/ limited ability  to participate in daily activities  Limited mobility, weight bearing status, diminished neuromuscular response and weakness limits function of effected part for some activities, Requires skilled supervision to complete and progress treatment interventions and HEP.  Pt demo good understanding of the education provided. Patient demonstrated good return demonstration of activities.Patient's tolerance to treatment was good. Patient will continue to benefit from skilled PTintervention.Patient is making progress towards established goals.  New/Revised goals: no  Continue with established Plan of Care towards PT goals.   CMS Impairment/Limitation/Restriction for FOTO Lower Leg (w/o Knee) Survey  Status Limitation G-Code CMS " Severity Modifier  Intake 30% 70%  Predicted 42% 58% Goal Status+ CK - At least 40 percent but less than 60 percent  12/1/2017 41% 59%  1/23/2018 38% 62% Current Status CL - At least 60 percent but less than 80 percent  D/C Status CL **only report if this is discharge survey  +Based on FOTO predicted change score  *      PLAN     Certification Period: 11/14/2017 to 2/14/2018  Recommended Treatment Plan: 2 times per week for 12 weeks: Manual Therapy, Neuromuscular Re-ed, Patient Education and Therapeutic Exercise  Other Recommendations: modalities PRN   Pt may be seen by PTA as part of the rehabilitation team.      Therapist: Yusuf Farooq, PT

## 2018-02-06 ENCOUNTER — CLINICAL SUPPORT (OUTPATIENT)
Dept: REHABILITATION | Facility: HOSPITAL | Age: 73
End: 2018-02-06
Attending: PHYSICIAN ASSISTANT
Payer: MEDICARE

## 2018-02-06 DIAGNOSIS — R29.898 RIGHT LEG WEAKNESS: ICD-10-CM

## 2018-02-06 DIAGNOSIS — S82.402F TYPE III OPEN FRACTURE OF LEFT TIBIA AND FIBULA WITH ROUTINE HEALING, SUBSEQUENT ENCOUNTER: Primary | ICD-10-CM

## 2018-02-06 DIAGNOSIS — M67.01 HEEL CORD TIGHTNESS, RIGHT: ICD-10-CM

## 2018-02-06 DIAGNOSIS — S82.202F TYPE III OPEN FRACTURE OF LEFT TIBIA AND FIBULA WITH ROUTINE HEALING, SUBSEQUENT ENCOUNTER: Primary | ICD-10-CM

## 2018-02-06 DIAGNOSIS — M25.673 DECREASED RANGE OF MOTION OF ANKLE: ICD-10-CM

## 2018-02-06 DIAGNOSIS — M25.60 RESTRICTION OF JOINT MOTION: ICD-10-CM

## 2018-02-06 DIAGNOSIS — R26.89 BALANCE PROBLEMS: ICD-10-CM

## 2018-02-06 DIAGNOSIS — Z74.09 IMPAIRED MOBILITY: ICD-10-CM

## 2018-02-06 PROCEDURE — 97110 THERAPEUTIC EXERCISES: CPT | Mod: PN | Performed by: PHYSICAL THERAPIST

## 2018-02-06 NOTE — PLAN OF CARE
"                                        Physical Therapy Daily Note           Name: Janusz Montgomery Jr.  Clinic Number: [id number]  Date of Treatment: 02/06/2018   Diagnosis:   Encounter Diagnoses   Name Primary?    Type III open fracture of left tibia and fibula with routine healing, subsequent encounter Yes    Balance problems     Impaired mobility     Heel cord tightness, right     Decreased range of motion of ankle     Restriction of joint motion     Right leg weakness        Time in: 1515  Time Out:1625  Total Treatment Time: 70  VISITS / PURDY: 19/20 - 12/31/2017  BOLD=INDICATES ACTIVITIES PERFORMED.      Subjective  Patient states he is not encountering any pain in the ankle and does not have any pain in the foot when he walks. There is numbness on the bottom of the foot when walking and the top is hypersensitive when anything touches the top. Trying to get into some water therapy.     Pain level - 0/10 reported.     Objective    1/25/2018 -   Ankle   Left   Pain/Dysfunction with Movement     AROM PROM MMT     Plantarflexion 20  20 30  30 2-/5 Limited by weight bearing    Dorsiflexion  Knee extend.    Knee flexed        5  8  8  10    8  15  15  15 2+/5  Joint mobility and tissue extensibility limitation    Inversion 5  15 10  20 2+/5 Joint mobility and tissue extensibility limitation    Eversion 5  20 8  30 2+/5  Joint mobility and tissue extensibility limitation       Girth:                        L               1/25/2018     2" sup.med mall       10.75         10.25  Figure 8                    24              24  Mid foot                     11              11    Joint mobility assessment   Tibiotalar (talocrual) - soft tissue end point with improved quality but no increased motion.   Subtalar - abrupt end point with improved quality and no change in quantity of motion.   Talonavicular and Calcaneocuboid- some improved quality medially and slight change laterally   Treatment: Patient  was instructed in " "and performed therapeutic exercises to develop strength, ROM, flexibility, balance and joint mobility. Patient performed therapeutic exercises consisting of the following      Leg press   Recumbent bike  Upright bike 8'  UE ergometer  Treadmill   Elliptical 1-2' - endurance compromised.       THERAPEUTIC EXERCISE 1-1 PT = 70  '  SUPINE   -heel cord stretch 5'   -DF/PF x50 knee flexed at 30   -inver/ever x50   -circles x40 ea  -ABCs   -posterioir tib and peroneal stretch 30" x 4 ea. W/strap     SIDELYING  -eversion / inversion x30    PRONE    STANDING.  -gastroc stretch 2'  -soleus stretch 2'   -Toe stretch 2'  -heel raises x20  -SL stand on airex 15" x6 // bars   -SL stand airex 30" VICKI   =SL stand on airex with fwd lean and back lean.   -tandem stand 15 " x8       SITTING  -toe curls      x20  -toes stretch 1' x 2   -prostretch 2'  -inversion   x20  -DF knee flexed / knee extended x20ea  -anterior tib stretch 30" x 4 with DF / PF x15   +soleus stretch 2'   -self stretch ( ant tib, peroneal, post tib ) 10" x 12 ea       MANUAL THERAPY:   MODALITY =   DIRECT EDUCATION:    Patient was issued HEP        Assessment    The patient completed all activities as noted. He is able to maintain standing without encountering any pain not wearing the boot.  Mobility restrictions persist however there appears to be some improvement in mid tarsal passive mobility. Sensory challenges remain. Would like to progress with gait without the boot.   Medical Necessity is demonstrated by:   Unable/ limited ability  to participate in daily activities  Limited mobility, weight bearing status, diminished neuromuscular response and weakness limits function of effected part for some activities, Requires skilled supervision to complete and progress treatment interventions and HEP.  Pt demo good understanding of the education provided. Patient demonstrated good return demonstration of activities.Patient's tolerance to treatment was good. Patient " will continue to benefit from skilled PTintervention.Patient is making progress towards established goals.  New/Revised goals: no  Continue with established Plan of Care towards PT goals.   CMS Impairment/Limitation/Restriction for FOTO Lower Leg (w/o Knee) Survey  Status Limitation G-Code CMS Severity Modifier  Intake 30% 70%  Predicted 42% 58% Goal Status+ CK - At least 40 percent but less than 60 percent  12/1/2017 41% 59%  1/23/2018 38% 62% Current Status CL - At least 60 percent but less than 80 percent  D/C Status CL **only report if this is discharge survey  +Based on FOTO predicted change score  *      PLAN     Certification Period: 2/6/2018to 5/6//2018  Recommended Treatment Plan: 2 times per week for 12 weeks: Manual Therapy, Neuromuscular Re-ed, Patient Education and Therapeutic Exercise  Other Recommendations: modalities PRN   Pt may be seen by PTA as part of the rehabilitation team.      Therapist: Yusuf Farooq, PT        02/07/2018

## 2018-02-06 NOTE — PROGRESS NOTES
"                                          Physical Therapy Daily Note           Name: Janusz Montgomery Jr.  Clinic Number: [id number]  Date of Treatment: 02/06/2018   Diagnosis:   Encounter Diagnoses   Name Primary?    Type III open fracture of left tibia and fibula with routine healing, subsequent encounter Yes    Balance problems     Impaired mobility     Heel cord tightness, right     Decreased range of motion of ankle     Restriction of joint motion     Right leg weakness        Time in: 1515  Time Out:1625  Total Treatment Time: 70  VISITS / PURDY: 19/20 - 12/31/2017  BOLD=INDICATES ACTIVITIES PERFORMED.      Subjective  Patient states he is not encountering any pain in the ankle and does not have any pain in the foot when he walks. There is numbness on the bottom of the foot when walking and the top is hypersensitive when anything touches the top. Trying to get into some water therapy.     Pain level - 0/10 reported.     Objective    1/25/2018 -   Ankle   Left   Pain/Dysfunction with Movement     AROM PROM MMT     Plantarflexion 20  20 30  30 2-/5 Limited by weight bearing    Dorsiflexion  Knee extend.    Knee flexed        5  8  8  10    8  15  15  15 2+/5  Joint mobility and tissue extensibility limitation    Inversion 5  15 10  20 2+/5 Joint mobility and tissue extensibility limitation    Eversion 5  20 8  30 2+/5  Joint mobility and tissue extensibility limitation       Girth:                        L               1/25/2018     2" sup.med mall       10.75         10.25  Figure 8                    24              24  Mid foot                     11              11    Joint mobility assessment   Tibiotalar (talocrual) - soft tissue end point with improved quality but no increased motion.   Subtalar - abrupt end point with improved quality and no change in quantity of motion.   Talonavicular and Calcaneocuboid- some improved quality medially and slight change laterally   Treatment: Patient  was instructed " "in and performed therapeutic exercises to develop strength, ROM, flexibility, balance and joint mobility. Patient performed therapeutic exercises consisting of the following      Leg press   Recumbent bike  Upright bike 8'  UE ergometer  Treadmill   Elliptical 1-2' - endurance compromised.       THERAPEUTIC EXERCISE 1-1 PT = 70  '  SUPINE   -heel cord stretch 5'   -DF/PF x50 knee flexed at 30   -inver/ever x50   -circles x40 ea  -ABCs   -posterioir tib and peroneal stretch 30" x 4 ea. W/strap     SIDELYING  -eversion / inversion x30    PRONE    STANDING.  -gastroc stretch 2'  -soleus stretch 2'   -Toe stretch 2'  -heel raises x20  -SL stand on airex 15" x6 // bars   -SL stand airex 30" VICKI   =SL stand on airex with fwd lean and back lean.   -tandem stand 15 " x8       SITTING  -toe curls      x20  -toes stretch 1' x 2   -prostretch 2'  -inversion   x20  -DF knee flexed / knee extended x20ea  -anterior tib stretch 30" x 4 with DF / PF x15   +soleus stretch 2'   -self stretch ( ant tib, peroneal, post tib ) 10" x 12 ea       MANUAL THERAPY:   MODALITY =   DIRECT EDUCATION:    Patient was issued HEP        Assessment    The patient completed all activities as noted. He is able to maintain standing without encountering any pain not wearing the boot.  Mobility restrictions persist however there appears to be some improvement in mid tarsal passive mobility. Sensory challenges remain. Would like to progress with gait without the boot.   Medical Necessity is demonstrated by:   Unable/ limited ability  to participate in daily activities  Limited mobility, weight bearing status, diminished neuromuscular response and weakness limits function of effected part for some activities, Requires skilled supervision to complete and progress treatment interventions and HEP.  Pt demo good understanding of the education provided. Patient demonstrated good return demonstration of activities.Patient's tolerance to treatment was good. " Patient will continue to benefit from skilled PTintervention.Patient is making progress towards established goals.  New/Revised goals: no  Continue with established Plan of Care towards PT goals.   CMS Impairment/Limitation/Restriction for FOTO Lower Leg (w/o Knee) Survey  Status Limitation G-Code CMS Severity Modifier  Intake 30% 70%  Predicted 42% 58% Goal Status+ CK - At least 40 percent but less than 60 percent  12/1/2017 41% 59%  1/23/2018 38% 62% Current Status CL - At least 60 percent but less than 80 percent  D/C Status CL **only report if this is discharge survey  +Based on FOTO predicted change score  *      PLAN     Certification Period: 2/6/2018to 5/6//2018  Recommended Treatment Plan: 2 times per week for 12 weeks: Manual Therapy, Neuromuscular Re-ed, Patient Education and Therapeutic Exercise  Other Recommendations: modalities PRN   Pt may be seen by PTA as part of the rehabilitation team.      Therapist: Yusuf Farooq, PT

## 2018-02-07 ENCOUNTER — OFFICE VISIT (OUTPATIENT)
Dept: FAMILY MEDICINE | Facility: CLINIC | Age: 73
End: 2018-02-07
Payer: MEDICARE

## 2018-02-07 VITALS
BODY MASS INDEX: 41.75 KG/M2 | DIASTOLIC BLOOD PRESSURE: 76 MMHG | WEIGHT: 315 LBS | OXYGEN SATURATION: 97 % | HEART RATE: 68 BPM | HEIGHT: 73 IN | TEMPERATURE: 98 F | SYSTOLIC BLOOD PRESSURE: 128 MMHG

## 2018-02-07 DIAGNOSIS — K21.9 GASTROESOPHAGEAL REFLUX DISEASE WITHOUT ESOPHAGITIS: ICD-10-CM

## 2018-02-07 DIAGNOSIS — I10 ESSENTIAL HYPERTENSION: ICD-10-CM

## 2018-02-07 DIAGNOSIS — G47.33 OBSTRUCTIVE SLEEP APNEA ON CPAP: ICD-10-CM

## 2018-02-07 DIAGNOSIS — E66.01 MORBID OBESITY WITH BMI OF 40.0-44.9, ADULT: ICD-10-CM

## 2018-02-07 DIAGNOSIS — F33.0 MAJOR DEPRESSIVE DISORDER, RECURRENT EPISODE, MILD: ICD-10-CM

## 2018-02-07 DIAGNOSIS — I73.9 PVD (PERIPHERAL VASCULAR DISEASE): ICD-10-CM

## 2018-02-07 DIAGNOSIS — Z74.09 IMPAIRED MOBILITY: ICD-10-CM

## 2018-02-07 DIAGNOSIS — I48.0 PAROXYSMAL ATRIAL FIBRILLATION: ICD-10-CM

## 2018-02-07 DIAGNOSIS — Z00.00 ROUTINE MEDICAL EXAM: Primary | ICD-10-CM

## 2018-02-07 DIAGNOSIS — I25.10 CORONARY ARTERY DISEASE INVOLVING NATIVE CORONARY ARTERY OF NATIVE HEART WITHOUT ANGINA PECTORIS: ICD-10-CM

## 2018-02-07 DIAGNOSIS — C61 PROSTATE CANCER: ICD-10-CM

## 2018-02-07 DIAGNOSIS — R60.9 EDEMA, UNSPECIFIED TYPE: ICD-10-CM

## 2018-02-07 PROBLEM — R29.898 RIGHT LEG WEAKNESS: Status: RESOLVED | Noted: 2017-11-14 | Resolved: 2018-02-07

## 2018-02-07 PROBLEM — M25.60 RESTRICTION OF JOINT MOTION: Status: RESOLVED | Noted: 2017-11-14 | Resolved: 2018-02-07

## 2018-02-07 PROBLEM — L97.529 NON-PRESSURE CHRONIC ULCER OF OTHER PART OF LEFT FOOT WITH UNSPECIFIED SEVERITY: Status: RESOLVED | Noted: 2017-02-21 | Resolved: 2018-02-07

## 2018-02-07 PROCEDURE — 99499 UNLISTED E&M SERVICE: CPT | Mod: S$GLB,,, | Performed by: INTERNAL MEDICINE

## 2018-02-07 PROCEDURE — 99999 PR PBB SHADOW E&M-EST. PATIENT-LVL III: CPT | Mod: PBBFAC,,, | Performed by: INTERNAL MEDICINE

## 2018-02-07 PROCEDURE — 99397 PER PM REEVAL EST PAT 65+ YR: CPT | Mod: S$GLB,,, | Performed by: INTERNAL MEDICINE

## 2018-02-07 RX ORDER — FUROSEMIDE 20 MG/1
20 TABLET ORAL DAILY
Qty: 90 TABLET | Refills: 0 | Status: SHIPPED | OUTPATIENT
Start: 2018-02-07 | End: 2020-02-04 | Stop reason: SDUPTHER

## 2018-02-07 RX ORDER — VENLAFAXINE 75 MG/1
150 TABLET ORAL 2 TIMES DAILY
Qty: 360 TABLET | Refills: 1 | Status: SHIPPED | OUTPATIENT
Start: 2018-02-07 | End: 2018-11-09 | Stop reason: SDUPTHER

## 2018-02-07 RX ORDER — OMEPRAZOLE 20 MG/1
20 CAPSULE, DELAYED RELEASE ORAL DAILY PRN
Qty: 90 CAPSULE | Refills: 0 | Status: ON HOLD | OUTPATIENT
Start: 2018-02-07 | End: 2020-01-30 | Stop reason: HOSPADM

## 2018-02-07 RX ORDER — RAMIPRIL 5 MG/1
5 CAPSULE ORAL DAILY
Qty: 90 CAPSULE | Refills: 1 | Status: SHIPPED | OUTPATIENT
Start: 2018-02-07 | End: 2018-03-27 | Stop reason: SDUPTHER

## 2018-02-08 ENCOUNTER — CLINICAL SUPPORT (OUTPATIENT)
Dept: REHABILITATION | Facility: HOSPITAL | Age: 73
End: 2018-02-08
Attending: PHYSICIAN ASSISTANT
Payer: MEDICARE

## 2018-02-08 ENCOUNTER — PES CALL (OUTPATIENT)
Dept: ADMINISTRATIVE | Facility: CLINIC | Age: 73
End: 2018-02-08

## 2018-02-08 DIAGNOSIS — M25.673 DECREASED RANGE OF MOTION OF ANKLE: ICD-10-CM

## 2018-02-08 DIAGNOSIS — R26.89 BALANCE PROBLEMS: ICD-10-CM

## 2018-02-08 DIAGNOSIS — S82.402F TYPE III OPEN FRACTURE OF LEFT TIBIA AND FIBULA WITH ROUTINE HEALING, SUBSEQUENT ENCOUNTER: Primary | ICD-10-CM

## 2018-02-08 DIAGNOSIS — R29.898 RIGHT LEG WEAKNESS: ICD-10-CM

## 2018-02-08 DIAGNOSIS — M25.60 RESTRICTION OF JOINT MOTION: ICD-10-CM

## 2018-02-08 DIAGNOSIS — Z74.09 IMPAIRED MOBILITY: ICD-10-CM

## 2018-02-08 DIAGNOSIS — M67.01 HEEL CORD TIGHTNESS, RIGHT: ICD-10-CM

## 2018-02-08 DIAGNOSIS — S82.202F TYPE III OPEN FRACTURE OF LEFT TIBIA AND FIBULA WITH ROUTINE HEALING, SUBSEQUENT ENCOUNTER: Primary | ICD-10-CM

## 2018-02-08 PROCEDURE — G8979 MOBILITY GOAL STATUS: HCPCS | Mod: CK,PN | Performed by: PHYSICAL THERAPIST

## 2018-02-08 PROCEDURE — 97110 THERAPEUTIC EXERCISES: CPT | Mod: PN | Performed by: PHYSICAL THERAPIST

## 2018-02-08 PROCEDURE — G8978 MOBILITY CURRENT STATUS: HCPCS | Mod: CK,PN | Performed by: PHYSICAL THERAPIST

## 2018-02-08 NOTE — PROGRESS NOTES
"HISTORY OF PRESENT ILLNESS:  Janusz Montgomery Jr. is a 73 y.o. male who presents to the clinic today for a routine medical physical exam. His last physical exam was more than a year ago.      PAST MEDICAL HISTORY:  Past Medical History:   Diagnosis Date    NAT (acute kidney injury) 3/19/2017    ALLERGIC RHINITIS     Anemia     Anxiety     Basal cell carcinoma     forehead    Basal cell carcinoma     left eylid    Basal cell carcinoma 08/18/2014    left prox cheek    Basal cell carcinoma 9/13/13    Right anterior shoulder    Basal cell carcinoma     Chronic rhinitis 5/3/2013    Chronic rhinitis 5/3/2013    Coronary artery disease involving native coronary artery of native heart without angina pectoris s/p RCA stent     Cortical cataract of both eyes 3/18/2016    Depression     Erectile dysfunction 3/24/2014    Erectile dysfunction 3/24/2014    Essential hypertension     GERD (gastroesophageal reflux disease) 7/25/2012    Gout, arthritis     Grade III open fracture of left tibia and fibula s/p ex-fix on 7/1/16 and ORIF of left tibia on 7/15 7/6/2016    H/O: iritis     Helicobacter pylori (H. pylori) infection     Treated    Herpes simplex keratoconjunctivitis 9/30/2015    - on acyclovir - followed by opthalmology, Dr. Uribe     Herpes simplex keratoconjunctivitis 9/30/2015    - on acyclovir - followed by opthalmology, Dr. Uribe     Hyperkalemia 2/28/2017    Hyperlipidemia     Hypogonadism male     Hypogonadism male     Mixed anxiety and depressive disorder     Morbid obesity     Obstructive sleep apnea on CPAP     CPAP    Osteoarthritis of left knee 7/25/2012    Paroxysmal atrial fibrillation 7/6/2016    Primary osteoarthritis of left knee 7/25/2012    Prominent aorta 1/25/2016    "RESULTS: THE HEART IS MILDLY ENLARGED WITH A SLIGHTLY PROMINENT AORTA" - Xray Chest PA & Lateral 12-     Prostate cancer 2/15/2016    - followed by urology, Dr. Young     Prostate cancer " 2/15/2016    - followed by urology, Dr. 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 Conteh    CATARACT EXTRACTION W/  INTRAOCULAR LENS IMPLANT Left 4/12/2016        EXTERNAL FIXATION TIBIAL FRACTURE Left 07/01/2016    ORIF TIBIA FRACTURE Left 07/15/2016    Squamous cell cancer removal x3 with Mohs surgery      TONSILLECTOMY      TOTAL KNEE ARTHROPLASTY  10/2012    trus/bx         SOCIAL HISTORY:  Social History     Social History    Marital status:      Spouse name: N/A    Number of children: N/A    Years of education: N/A     Occupational History    Retired       Social History Main Topics    Smoking status: Never Smoker    Smokeless tobacco: Never Used    Alcohol use Yes      Comment: occasionally    Drug use: No    Sexual activity: Yes     Other Topics Concern    Not on file     Social History Narrative    No narrative on file       FAMILY HISTORY:  Family History   Problem Relation Age of Onset    Skin cancer Father     Lung cancer Father     Cancer Father      smoker,     Alzheimer's disease Mother     Hypertension Mother     Cancer Sister      colon, lung cancer     Cancer Brother      skin cancer, polypectomy     Peripheral vascular disease      Melanoma Neg Hx     Psoriasis Neg Hx     Lupus Neg Hx     Eczema Neg Hx     Amblyopia Neg Hx     Blindness Neg Hx     Cataracts Neg Hx     Diabetes Neg Hx     Glaucoma Neg Hx     Macular degeneration  Neg Hx     Retinal detachment Neg Hx     Strabismus Neg Hx     Stroke Neg Hx     Thyroid disease Neg Hx     Acne Neg Hx        ALLERGIES AND MEDICATIONS: updated and reviewed.  Review of patient's allergies indicates:   Allergen Reactions    Ciprofloxacin Rash     Diffuse pruritic morbilliform rash developed 3/15/2017 after dose of cipro; previously in 2/2017 he had rash/fevers after initiation of cipro    Zosyn [piperacillin-tazobactam] Anaphylaxis     Diffuse pruritic morbilliform rash developed 3/15/2017.  Then, 430am dose on 3/16 and rash worsened with SOB/tachypnea but no hypoxemia.     Bacitracin Itching and Rash     Violaceous rash in area of topical Tx.      Medication List with Changes/Refills   New Medications    FUROSEMIDE (LASIX) 20 MG TABLET    Take 1 tablet (20 mg total) by mouth once daily.    OMEPRAZOLE (PRILOSEC) 20 MG CAPSULE    Take 1 capsule (20 mg total) by mouth daily as needed.   Current Medications    ACYCLOVIR (ZOVIRAX) 800 MG TAB    Take 1 tablet (800 mg total) by mouth once daily.    APIXABAN 5 MG TAB    Take 1 tablet (5 mg total) by mouth 2 (two) times daily.    ARTIFICIAL TEARS (ISOPTO TEARS) 0.5 % OPHTHALMIC SOLUTION    Place 1 drop into both eyes as needed.    ASPIRIN (ECOTRIN) 81 MG EC TABLET    Take 1 tablet (81 mg total) by mouth once daily.    ATORVASTATIN (LIPITOR) 40 MG TABLET    Take 1 tablet (40 mg total) by mouth once daily.    CYANOCOBALAMIN, VITAMIN B-12, (VITAMIN B-12 ORAL)    Take 2,500 mcg by mouth once daily.    GABAPENTIN (NEURONTIN) 300 MG CAPSULE    TAKE ONE CAPSULE BY MOUTH THREE TIMES DAILY    MELATONIN 3 MG TAB    Take 6 mg by mouth nightly as needed (for sleep).     TAMSULOSIN (FLOMAX) 0.4 MG CP24    Take 1 capsule (0.4 mg total) by mouth once daily.    VITAMIN D 1000 UNITS TAB    Take 1 tablet (1,000 Units total) by mouth once daily.    ZINC SULFATE (ZINCATE) 220 (50) MG CAPSULE    Take 1 capsule (220 mg total) by mouth once daily.   Changed and/or  Refilled Medications    Modified Medication Previous Medication    RAMIPRIL (ALTACE) 5 MG CAPSULE ramipril (ALTACE) 5 MG capsule       Take 1 capsule (5 mg total) by mouth once daily.    Take 1 capsule (5 mg total) by mouth once daily.    VENLAFAXINE (EFFEXOR) 75 MG TABLET venlafaxine (EFFEXOR) 75 MG tablet       Take 2 tablets (150 mg total) by mouth 2 (two) times daily.    Take 2 tablets (150 mg total) by mouth 2 (two) times daily.   Discontinued Medications    ALPRAZOLAM (XANAX XR) 0.5 MG TB24    Take 1 tablet (0.5 mg total) by mouth once daily.    FUROSEMIDE (LASIX) 20 MG TABLET    Take 1 tablet (20 mg total) by mouth 2 (two) times daily.    OSELTAMIVIR (TAMIFLU) 75 MG CAPSULE    Take 1 capsule (75 mg total) by mouth once daily.         CARE TEAM:  Patient Care Team:  Miguelina Weathers MD as PCP - General (Internal Medicine)  Navdeep Guardado MD (Cardiology)  Luis Palacio MD as Consulting Physician (Cardiology)  Jessica Herndon MD (Inactive) (Nephrology)  Colt Sosa MD as Consulting Physician (Plastic Surgery)  AURELIANO Minor MD as Consulting Physician (Urology)  Walter Cortés MD as Consulting Physician (Orthopedic Surgery)  Mojgan Gold, RN as Research Coordinator (Infectious Diseases)  Bev Dewey MD as Consulting Physician (Infectious Diseases)  Humera Watts (Inactive) as Research Coordinator (Infectious Diseases)  Joanie Calix as Research Coordinator           SCREENING HISTORY:  Health Maintenance       Date Due Completion Date    TETANUS VACCINE [birth year]/1965 ---    Colonoscopy 06/21/2017 6/21/2007 (Done)    Override on 6/21/2007: Done    High Dose Statin 02/07/2019 2/7/2018    Lipid Panel 12/12/2022 12/12/2017            REVIEW OF SYSTEMS:   The patient reports poor dietary habits.  The patient does not exercise regularly.  Review of Systems   Constitutional: Negative for chills, fever and weight loss.        He is gaining weight again. He admits to over eating. He  "does a lot of eating late at night just before he goes to sleep. He would like to get back on an appetite suppressant medication. He currently cannot exercise due to he is still healing from his LLE fracture.  He has very poor sleep habits. Often sleeps 12 hours. Goes to bed at 5 am and gets up long after noon.   HENT: Positive for hearing loss (- chronic). Negative for congestion and sinus pain.    Eyes: Negative for blurred vision and discharge.   Respiratory: Positive for shortness of breath (- chronic - with exertion). Negative for cough, hemoptysis, sputum production and wheezing.    Cardiovascular: Positive for palpitations (- with exertion) and leg swelling (- chronic; currently not taking either lasix or torsemide; he used to be on torsemide.  This was changed to Lasix in the hospital.  He states he was discharged with Lasix but this "took Motrin 2 much out of him" and he stopped taking fluid pills altogether). Negative for chest pain and claudication.   Gastrointestinal: Positive for heartburn (- occasional; takes omeprazole as needed). Negative for abdominal pain, blood in stool, constipation, diarrhea, nausea and vomiting.   Genitourinary: Negative for dysuria, hematuria and urgency.        He is overdue for follow up with urology for his prostate cancer.   Musculoskeletal: Negative for neck pain.        Chronic left leg pain from previous traumatic fracture. Doing PT now for strengthening, ROM and balance.   Skin: Negative for rash.   Neurological: Positive for weakness. Negative for dizziness, focal weakness, seizures and headaches.   Endo/Heme/Allergies: Negative for polydipsia.   Psychiatric/Behavioral: Positive for depression (- worse now that he can no longer ride a motorcycle and does not see his grandchildren as much anymore). Negative for hallucinations, memory loss, substance abuse and suicidal ideas. The patient has insomnia.            PHYSICAL EXAM:  Vitals:    02/07/18 1429   BP: 128/76 " "  Pulse: 68   Temp: 98.4 °F (36.9 °C)     Weight: (!) 164.7 kg (363 lb 1.6 oz)   Height: 6' 1" (185.4 cm)   Body mass index is 47.9 kg/m².    Physical Examination: General appearance - alert, well appearing, and in no distress and morbidly obese  Mental status - alert, oriented to person, place, and time, normal behavior, speech, dress, motor activity, and thought processes, depressed mood  Eyes - pupils equal and reactive, extraocular eye movements intact, sclera anicteric  Mouth - mucous membranes moist, pharynx normal without lesions  Neck - supple, no significant adenopathy, carotids upstroke normal bilaterally, no bruits, thyroid exam: thyroid is normal in size without nodules or tenderness  Lymphatics - no palpable lymphadenopathy  Chest - clear to auscultation, no wheezes, rales or rhonchi, symmetric air entry  Heart - normal rate and regular rhythm, no gallops noted  Abdomen - soft, nontender, nondistended, no masses or organomegaly   Male - not examined  Back exam - limited range of motion due to body habitus  Neurological - alert, oriented, normal speech, no focal findings or movement disorder noted, cranial nerves II through XII intact  Musculoskeletal - not examined, left foot in boot  Extremities - pedal edema 1 +  Skin - normal coloration and turgor, no rashes, no suspicious skin lesions noted       ASSESSMENT AND PLAN:  1. Routine medical exam  Counseled on age appropriate medical preventative services including age appropriate cancer screenings, age appropriate eye and dental exams, over all nutritional health, need for a consistent exercise regimen, and an over all push towards maintaining a vigorous and active lifestyle.  Counseled on age appropriate vaccines and discussed upcoming health care needs based on age/gender. Discussed good sleep hygiene and stress management.     2. Essential hypertension/3. Coronary artery disease involving native coronary artery of native heart without angina " pectoris s/p RCA stent/4. Paroxysmal atrial fibrillation - new onset after trauma/5. PVD (peripheral vascular disease)  The current medical regimen is effective;  continue present plan and medications. Followed by cardiology.    6. Obstructive sleep apnea on CPAP  We discussed the potential ramifications of untreated sleep apnea, which could include daytime sleepiness, hypertension, heart disease including CHF, sudden death while sleeping and/or stroke. The patient was advised to abstain from driving should they feel sleepy or drowsy.  We discussed potential treatment options, which could include weight loss, body positioning, continuous positive airway pressure (CPAP), or referral for surgical consideration.  He reports compliance with his CPAP machine.    7. Gastroesophageal reflux disease without esophagitis  Symptoms controlled. Reflux precautions discussed (eliminate tobacco if a smoker; minimize caffeine, chocolate and red/white peppermint intake; avoid heavy and spicy meals; don't lay down within 2-3 hours after eating). Medication as needed. Patient asked to take medication breaks, if possible - discussed chronic use can limit calcium absorption, increases the risk for intestinal infections, and can cause kidney damage. There are also some newer studies that show possible increased risk of mortality.   - venlafaxine (EFFEXOR) 75 MG tablet; Take 2 tablets (150 mg total) by mouth 2 (two) times daily.  Dispense: 360 tablet; Refill: 1    8. Prostate cancer  The patient denies any symptoms.  He will follow-up with urology at his earliest convenience for further evaluation/surveillance.    9. Major depressive disorder, recurrent episode, mild  He will increase his venlafaxine back to 2 tablets twice a day.  Consider counseling.  We also discussed that he needs to find other ways to be happy.  He likes to work on the computer and he should try to volunteer somewhere and teach others.  Perhaps he can get a  motorcycle with 3 wheels.  He can also consider doing some mentoring of young children as he misses spending time with his grandchildren.    10. Impaired mobility  Improving. Continue PT.    11. Morbid obesity with BMI of 40.0-44.9, adult  The patient is asked to make an attempt to improve diet and exercise patterns to aid in medical management of this problem.   I will refer him to one of my partners for further evaluation/treatment per his request.    12. Edema, unspecified type  I advised the patient he needs to be on either Lasix or torsemide on a regular basis.  For now we will start him back on Lasix 20 mg once a day.  If necessary, we can increase this to twice a day or 40 mg once a day.  We will check a BMP in 2 weeks to assess his potassium level as he states in the past his medication has caused him some muscle cramping.  - furosemide (LASIX) 20 MG tablet; Take 1 tablet (20 mg total) by mouth once daily.  Dispense: 90 tablet; Refill: 0  - Basic metabolic panel; Future      The patient was given a refill on his omeprazole to take as needed for reflux.  Symptoms controlled some of the time. Reflux precautions discussed (eliminate tobacco if a smoker; minimize caffeine, chocolate and red/white peppermint intake; avoid heavy and spicy meals; don't lay down within 2-3 hours after eating). Medication as needed. Patient asked to take medication breaks, if possible - discussed chronic use can limit calcium absorption, increases the risk for intestinal infections, and can cause kidney damage. There are also some newer studies that show possible increased risk of mortality.         Follow-up in about 6 months (around 8/7/2018), or if symptoms worsen or fail to improve, for follow up chronic medical conditions.. or sooner as needed.        Answers for HPI/ROS submitted by the patient on 2/6/2018   activity change: Yes  unexpected weight change: No  rhinorrhea: No  trouble swallowing: No  visual disturbance: No  chest  tightness: Yes  polyuria: No  difficulty urinating: No  joint swelling: No  arthralgias: No  confusion: Yes  dysphoric mood: Yes

## 2018-02-14 ENCOUNTER — PATIENT MESSAGE (OUTPATIENT)
Dept: ORTHOPEDICS | Facility: CLINIC | Age: 73
End: 2018-02-14

## 2018-02-14 ENCOUNTER — TELEPHONE (OUTPATIENT)
Dept: ORTHOPEDICS | Facility: CLINIC | Age: 73
End: 2018-02-14

## 2018-02-15 ENCOUNTER — CLINICAL SUPPORT (OUTPATIENT)
Dept: REHABILITATION | Facility: HOSPITAL | Age: 73
End: 2018-02-15
Attending: PHYSICIAN ASSISTANT
Payer: MEDICARE

## 2018-02-15 DIAGNOSIS — M25.60 RESTRICTION OF JOINT MOTION: ICD-10-CM

## 2018-02-15 DIAGNOSIS — Z74.09 IMPAIRED MOBILITY: ICD-10-CM

## 2018-02-15 DIAGNOSIS — M67.01 HEEL CORD TIGHTNESS, RIGHT: ICD-10-CM

## 2018-02-15 DIAGNOSIS — R26.89 BALANCE PROBLEMS: ICD-10-CM

## 2018-02-15 DIAGNOSIS — M25.673 DECREASED RANGE OF MOTION OF ANKLE: ICD-10-CM

## 2018-02-15 DIAGNOSIS — R29.898 RIGHT LEG WEAKNESS: ICD-10-CM

## 2018-02-15 DIAGNOSIS — S82.202F TYPE III OPEN FRACTURE OF LEFT TIBIA AND FIBULA WITH ROUTINE HEALING, SUBSEQUENT ENCOUNTER: Primary | ICD-10-CM

## 2018-02-15 DIAGNOSIS — S82.402F TYPE III OPEN FRACTURE OF LEFT TIBIA AND FIBULA WITH ROUTINE HEALING, SUBSEQUENT ENCOUNTER: Primary | ICD-10-CM

## 2018-02-15 PROCEDURE — 97110 THERAPEUTIC EXERCISES: CPT | Mod: PN | Performed by: PHYSICAL THERAPIST

## 2018-02-15 NOTE — PROGRESS NOTES
"                                          Physical Therapy Daily Note           Name: Janusz Montgomery Jr.  Clinic Number: [id number]  Date of Treatment: 02/15/2018   Diagnosis:   Encounter Diagnoses   Name Primary?    Type III open fracture of left tibia and fibula with routine healing, subsequent encounter Yes    Balance problems     Impaired mobility     Restriction of joint motion     Decreased range of motion of ankle     Heel cord tightness, right     Right leg weakness        Time in: 1520  Time Out: 1620   Total Treatment Time: 60  VISITS / PURDY: 20/20 - 12/31/2017    BOLD=INDICATES ACTIVITIES PERFORMED.      Subjective  Patient states there is no pain only tightness across the top of the foot.     Pain level - 0/10 reported.     Objective    1/25/2018 -   Ankle   Left   Pain/Dysfunction with Movement     AROM PROM MMT     Plantarflexion 20  20 30  30 2-/5 Limited by weight bearing    Dorsiflexion  Knee extend.    Knee flexed        5  8  8  10    8  15  15  15 2+/5  Joint mobility and tissue extensibility limitation    Inversion 5  15 10  20 2+/5 Joint mobility and tissue extensibility limitation    Eversion 5  20 8  30 2+/5  Joint mobility and tissue extensibility limitation       Girth:                        L               1/25/2018     2" sup.med mall       10.75         10.25  Figure 8                    24              24  Mid foot                     11              11    Joint mobility assessment   Tibiotalar (talocrual) - soft tissue end point with improved quality but no increased motion.   Subtalar - abrupt end point with improved quality and no change in quantity of motion.   Talonavicular and Calcaneocuboid- some improved quality medially and slight change laterally   Treatment: Patient  was instructed in and performed therapeutic exercises to develop strength, ROM, flexibility, balance and joint mobility. Patient performed therapeutic exercises consisting of the following      Leg press " "  Recumbent bike  Upright bike 8'  UE ergometer  Treadmill   Elliptical 1-2' - endurance compromised.       THERAPEUTIC EXERCISE 1-1 PT =  30 '  SUPINE   -heel cord stretch 5'   -DF/PF x50 knee flexed at 30   -inver/ever x50   -circles x40 ea  -ABCs   -posterioir tib and peroneal stretch 30" x 4 ea. W/strap     SIDELYING  -eversion / inversion x30    PRONE    STANDING.  -gastroc stretch 2'  -soleus stretch 2'   -Toe stretch 2'  -heel raises x20  -SL stand on airex 15" x6 // bars   -SL stand airex 30" VICKI   =SL stand on airex with fwd lean and back lean.   -tandem stand 15 " x8       SITTING  -toe curls      x20  -toes stretch 1' x 2   -prostretch 2'  -inversion   x20  -DF knee flexed / knee extended x20ea  -anterior tib stretch 30" x 4 with DF / PF x15   +soleus stretch 2'   -self stretch ( ant tib, peroneal, post tib ) 10" x 12 ea       MANUAL THERAPY: joint mobs at the mid foot, subtalar and tibiotalar. 1-1 PT = 5 minutes.    MODALITY =   DIRECT EDUCATION:    Patient was issued HEP        Assessment     Performed all exercises as noted. Completed routine without limitation. He is progressing with weight bearing challenges. Able to maintain stability with single leg standing, no pain or inhibition of muscle function.   Medical Necessity is demonstrated by:   Unable/ limited ability  to participate in daily activities  Limited mobility, weight bearing status, diminished neuromuscular response and weakness limits function of effected part for some activities, Requires skilled supervision to complete and progress treatment interventions and HEP.  Pt demo good understanding of the education provided. Patient demonstrated good return demonstration of activities.Patient's tolerance to treatment was good. Patient will continue to benefit from skilled PTintervention.Patient is making progress towards established goals.  New/Revised goals: no  Continue with established Plan of Care towards PT goals.   *  CMS " Impairment/Limitation/Restriction for FOTO Lower Leg (w/o Knee) Survey  Status Limitation G-Code CMS Severity Modifier  Intake 30% 70%  Predicted 42% 58% Goal Status+ CK - At least 40 percent but less than 60 percent  12/1/2017 41% 59%  1/23/2018 38% 62%  2/8/2018 43% 57% Current Status CK - At least 40 percent but less than 60 percent  D/C Status CK **only report if this is discharge survey  +Based on FOTO predicted change score    PLAN     Certification Period: 2/6/2018to 5/6//2018  Recommended Treatment Plan: 2 times per week for 12 weeks: Manual Therapy, Neuromuscular Re-ed, Patient Education and Therapeutic Exercise  Other Recommendations: modalities PRN   Pt may be seen by PTA as part of the rehabilitation team.      Therapist: Yusuf Farooq, PT

## 2018-02-19 ENCOUNTER — CLINICAL SUPPORT (OUTPATIENT)
Dept: ELECTROPHYSIOLOGY | Facility: CLINIC | Age: 73
End: 2018-02-19
Attending: INTERNAL MEDICINE
Payer: MEDICARE

## 2018-02-19 DIAGNOSIS — I48.0 PAF (PAROXYSMAL ATRIAL FIBRILLATION): ICD-10-CM

## 2018-02-19 DIAGNOSIS — Z95.818 STATUS POST PLACEMENT OF IMPLANTABLE LOOP RECORDER: ICD-10-CM

## 2018-02-19 PROCEDURE — 93298 REM INTERROG DEV EVAL SCRMS: CPT | Mod: S$GLB,,, | Performed by: INTERNAL MEDICINE

## 2018-02-19 PROCEDURE — 93299 LOOP RECORDER REMOTE: CPT | Mod: S$GLB,,, | Performed by: INTERNAL MEDICINE

## 2018-02-20 ENCOUNTER — OFFICE VISIT (OUTPATIENT)
Dept: OPTOMETRY | Facility: CLINIC | Age: 73
End: 2018-02-20
Payer: MEDICARE

## 2018-02-20 DIAGNOSIS — B00.52 HERPES SIMPLEX DISCIFORM KERATITIS: Primary | ICD-10-CM

## 2018-02-20 DIAGNOSIS — S82.202F: Primary | ICD-10-CM

## 2018-02-20 DIAGNOSIS — S82.402F: Primary | ICD-10-CM

## 2018-02-20 PROCEDURE — 92012 INTRM OPH EXAM EST PATIENT: CPT | Mod: S$GLB,,, | Performed by: OPTOMETRIST

## 2018-02-20 PROCEDURE — 99999 PR PBB SHADOW E&M-EST. PATIENT-LVL II: CPT | Mod: PBBFAC,,, | Performed by: OPTOMETRIST

## 2018-02-20 RX ORDER — TRIFLURIDINE 1 G/100ML
1 SOLUTION OPHTHALMIC
Qty: 7.5 ML | Refills: 1 | Status: SHIPPED | OUTPATIENT
Start: 2018-02-20 | End: 2018-04-16 | Stop reason: ALTCHOICE

## 2018-02-20 RX ORDER — PREDNISOLONE ACETATE 10 MG/ML
1 SUSPENSION/ DROPS OPHTHALMIC 4 TIMES DAILY
Qty: 1 BOTTLE | Refills: 2 | Status: ON HOLD | OUTPATIENT
Start: 2018-02-20 | End: 2018-08-08 | Stop reason: HOSPADM

## 2018-02-20 NOTE — PROGRESS NOTES
Assessment /Plan     For exam results, see Encounter Report.    Herpes simplex disciform keratitis  -     prednisoLONE acetate (PRED FORTE) 1 % DrpS; Place 1 drop into the right eye 4 (four) times daily.  Dispense: 1 Bottle; Refill: 2  -     trifluridine (VIROPTIC) 1 % ophthalmic solution; Place 1 drop into both eyes 5 (five) times daily.  Dispense: 7.5 mL; Refill: 1        1.  Continue Acyclovir 800mg PO Daily.  Start PF 4x/day and Viroptic 5x/day.  No dendrites present today but will cover with Viroptic.  Follow-up with Dr. Dominguez 1 week.  Educated pt if condition worsens call clinic immediately.

## 2018-02-21 ENCOUNTER — LAB VISIT (OUTPATIENT)
Dept: LAB | Facility: HOSPITAL | Age: 73
End: 2018-02-21
Attending: INTERNAL MEDICINE
Payer: MEDICARE

## 2018-02-21 DIAGNOSIS — R60.9 EDEMA, UNSPECIFIED TYPE: ICD-10-CM

## 2018-02-21 LAB
ANION GAP SERPL CALC-SCNC: 7 MMOL/L
BUN SERPL-MCNC: 17 MG/DL
CALCIUM SERPL-MCNC: 9.2 MG/DL
CHLORIDE SERPL-SCNC: 104 MMOL/L
CO2 SERPL-SCNC: 28 MMOL/L
CREAT SERPL-MCNC: 1.1 MG/DL
EST. GFR  (AFRICAN AMERICAN): >60 ML/MIN/1.73 M^2
EST. GFR  (NON AFRICAN AMERICAN): >60 ML/MIN/1.73 M^2
GLUCOSE SERPL-MCNC: 96 MG/DL
POTASSIUM SERPL-SCNC: 4.4 MMOL/L
SODIUM SERPL-SCNC: 139 MMOL/L

## 2018-02-21 PROCEDURE — 80048 BASIC METABOLIC PNL TOTAL CA: CPT

## 2018-02-21 PROCEDURE — 36415 COLL VENOUS BLD VENIPUNCTURE: CPT | Mod: PN

## 2018-02-22 ENCOUNTER — OFFICE VISIT (OUTPATIENT)
Dept: DERMATOLOGY | Facility: CLINIC | Age: 73
End: 2018-02-22
Payer: MEDICARE

## 2018-02-22 DIAGNOSIS — L57.0 AK (ACTINIC KERATOSIS): Primary | ICD-10-CM

## 2018-02-22 DIAGNOSIS — L73.8 SEBACEOUS GLAND HYPERPLASIA: ICD-10-CM

## 2018-02-22 DIAGNOSIS — Z85.828 PERSONAL HISTORY OF OTHER MALIGNANT NEOPLASM OF SKIN: ICD-10-CM

## 2018-02-22 DIAGNOSIS — L82.1 SK (SEBORRHEIC KERATOSIS): ICD-10-CM

## 2018-02-22 DIAGNOSIS — L57.8 CHRONIC SOLAR DERMATITIS: ICD-10-CM

## 2018-02-22 PROCEDURE — 17000 DESTRUCT PREMALG LESION: CPT | Mod: S$GLB,,, | Performed by: DERMATOLOGY

## 2018-02-22 PROCEDURE — 3008F BODY MASS INDEX DOCD: CPT | Mod: S$GLB,,, | Performed by: DERMATOLOGY

## 2018-02-22 PROCEDURE — 99999 PR PBB SHADOW E&M-EST. PATIENT-LVL II: CPT | Mod: PBBFAC,,, | Performed by: DERMATOLOGY

## 2018-02-22 PROCEDURE — 1126F AMNT PAIN NOTED NONE PRSNT: CPT | Mod: S$GLB,,, | Performed by: DERMATOLOGY

## 2018-02-22 PROCEDURE — 1159F MED LIST DOCD IN RCRD: CPT | Mod: S$GLB,,, | Performed by: DERMATOLOGY

## 2018-02-22 PROCEDURE — 99213 OFFICE O/P EST LOW 20 MIN: CPT | Mod: 25,S$GLB,, | Performed by: DERMATOLOGY

## 2018-02-22 PROCEDURE — 17003 DESTRUCT PREMALG LES 2-14: CPT | Mod: S$GLB,,, | Performed by: DERMATOLOGY

## 2018-02-22 NOTE — PATIENT INSTRUCTIONS

## 2018-02-22 NOTE — PROGRESS NOTES
Subjective:       Patient ID:  Janusz Montgomery Jr. is a 73 y.o. male who presents for   Chief Complaint   Patient presents with    Spot     left post ear x few months , brown itchy no prev tx     Skin Check     UBSE     History of Present Illness: The patient presents for follow up of skin check.    The patient was last seen on: 8/10/17 for cryosurgery to actinic keratoses which have resolved.   This is a high risk patient here to check for the development of new lesions.    Other skin complaints: spot left ost ear x 3 months + itchy and brown no prev treatment    Using am lactin for arms and hands qweek.            Review of Systems   Constitutional: Positive for weight loss (wt # 360#). Negative for fever and chills.   Skin: Positive for wears hat ( when outdoors for long periods of time). Negative for itching, rash, daily sunscreen use, activity-related sunscreen use ( rarely outdoors) and recent sunburn.   Hematologic/Lymphatic: Bruises/bleeds easily (takes aspirin).        Objective:    Physical Exam   Constitutional: He appears well-developed and well-nourished. He is obese.  No distress.   HENT:   Mouth/Throat: Lips normal.    Eyes: Lids are normal.  No conjunctival no injection.   Cardiovascular: There is dependent edema (legs).     Lymphadenopathy:        Head (right side): No submental, no submandibular, no preauricular, no posterior auricular and no occipital adenopathy present.        Head (left side): No submental, no submandibular, no preauricular, no posterior auricular and no occipital adenopathy present.     He has no axillary adenopathy.   Neurological: He is alert and oriented to person, place, and time. He is not disoriented.   Psychiatric: He has a normal mood and affect.   Skin:   Areas Examined (abnormalities noted in diagram):   Scalp / Hair Palpated and Inspected  Head / Face Inspection Performed  Neck Inspection Performed  Chest / Axilla Inspection Performed  Back Inspection  Performed  RUE Inspected  LUE Inspection Performed  LLE Inspection Performed  Nails and Digits Inspection Performed                   Diagram Legend     Erythematous scaling macule/papule c/w actinic keratosis       Vascular papule c/w angioma      Pigmented verrucoid papule/plaque c/w seborrheic keratosis      Yellow umbilicated papule c/w sebaceous hyperplasia      Irregularly shaped tan macule c/w lentigo     1-2 mm smooth white papules consistent with Milia      Movable subcutaneous cyst with punctum c/w epidermal inclusion cyst      Subcutaneous movable cyst c/w pilar cyst      Firm pink to brown papule c/w dermatofibroma      Pedunculated fleshy papule(s) c/w skin tag(s)      Evenly pigmented macule c/w junctional nevus     Mildly variegated pigmented, slightly irregular-bordered macule c/w mildly atypical nevus      Flesh colored to evenly pigmented papule c/w intradermal nevus       Pink pearly papule/plaque c/w basal cell carcinoma      Erythematous hyperkeratotic cursted plaque c/w SCC      Surgical scar with no sign of skin cancer recurrence      Open and closed comedones      Inflammatory papules and pustules      Verrucoid papule consistent consistent with wart     Erythematous eczematous patches and plaques     Dystrophic onycholytic nail with subungual debris c/w onychomycosis     Umbilicated papule    Erythematous-base heme-crusted tan verrucoid plaque consistent with inflamed seborrheic keratosis     Erythematous Silvery Scaling Plaque c/w Psoriasis     See annotation      Assessment / Plan:        AK (actinic keratosis)  Cryosurgery Procedure Note    Verbal consent from the patient is obtained and the patient is aware of the precancerous quality and need for treatment of these lesions. Liquid nitrogen cryosurgery is applied to the 7 actinic keratoses, as detailed in the physical exam, to produce a freeze injury. The patient is aware that blisters may form and is instructed on wound care with gentle  cleansing and use of vaseline ointment to keep moist until healed. The patient is supplied a handout on cryosurgery and is instructed to call if lesions do not completely resolve.      SK (seborrheic keratosis)  These are benign inherited growths without a malignant potential. Reassurance given to patient. No treatment is necessary.       Chronic solar dermatitis  Encourage Am Lactin lotion or cream to arms and hands nightly. Available over-the counter.      Sebaceous gland hyperplasia   - stable and chronic      Personal history of other malignant neoplasm of skin  Area(s) of previous NMSC evaluated with no signs of recurrence.    Upper body skin examination performed today including at least 6 points as noted in physical examination. No lesions suspicious for malignancy noted.               Follow-up in about 6 months (around 8/22/2018).

## 2018-02-26 ENCOUNTER — CLINICAL SUPPORT (OUTPATIENT)
Dept: REHABILITATION | Facility: HOSPITAL | Age: 73
End: 2018-02-26
Attending: PHYSICIAN ASSISTANT
Payer: MEDICARE

## 2018-02-26 DIAGNOSIS — Z74.09 IMPAIRED MOBILITY: ICD-10-CM

## 2018-02-26 DIAGNOSIS — R26.89 BALANCE PROBLEMS: ICD-10-CM

## 2018-02-26 DIAGNOSIS — R29.898 RIGHT LEG WEAKNESS: ICD-10-CM

## 2018-02-26 DIAGNOSIS — M25.60 RESTRICTION OF JOINT MOTION: ICD-10-CM

## 2018-02-26 DIAGNOSIS — S82.202F TYPE III OPEN FRACTURE OF LEFT TIBIA AND FIBULA WITH ROUTINE HEALING, SUBSEQUENT ENCOUNTER: Primary | ICD-10-CM

## 2018-02-26 DIAGNOSIS — M25.673 DECREASED RANGE OF MOTION OF ANKLE: ICD-10-CM

## 2018-02-26 DIAGNOSIS — M67.01 HEEL CORD TIGHTNESS, RIGHT: ICD-10-CM

## 2018-02-26 DIAGNOSIS — S82.402F TYPE III OPEN FRACTURE OF LEFT TIBIA AND FIBULA WITH ROUTINE HEALING, SUBSEQUENT ENCOUNTER: Primary | ICD-10-CM

## 2018-02-26 PROCEDURE — 97110 THERAPEUTIC EXERCISES: CPT | Mod: PN | Performed by: PHYSICAL THERAPIST

## 2018-02-26 NOTE — PROGRESS NOTES
"                                          Physical Therapy Daily Note           Name: Janusz Montgomery Jr.  Clinic Number: [id number]  Date of Treatment: 02/26/2018   Diagnosis:   Encounter Diagnoses   Name Primary?    Type III open fracture of left tibia and fibula with routine healing, subsequent encounter Yes    Balance problems     Restriction of joint motion     Heel cord tightness, right     Impaired mobility     Decreased range of motion of ankle     Right leg weakness        Time in: 1415  Time Out: 1520  Total Treatment Time: 65  VISITS / PURDY: 20/20 - 12/31/2017  NEW:    BOLD=INDICATES ACTIVITIES PERFORMED.      Subjective  Patient states the foot and ankle are feeling oK. Does not seem to be hurting as much as before.     Pain level - 0/10 reported.     Objective    1/25/2018 -   Ankle   Left   Pain/Dysfunction with Movement     AROM PROM MMT     Plantarflexion 20  20 30  30 2-/5 Limited by weight bearing    Dorsiflexion  Knee extend.    Knee flexed        5  8  8  10    8  15  15  15 2+/5  Joint mobility and tissue extensibility limitation    Inversion 5  15 10  20 2+/5 Joint mobility and tissue extensibility limitation    Eversion 5  20 8  30 2+/5  Joint mobility and tissue extensibility limitation       Girth:                        L               1/25/2018     2" sup.med mall       10.75         10.25  Figure 8                    24              24  Mid foot                     11              11    Joint mobility assessment   Tibiotalar (talocrual) - soft tissue end point with improved quality but no increased motion.   Subtalar - abrupt end point with improved quality and no change in quantity of motion.   Talonavicular and Calcaneocuboid- some improved quality medially and slight change laterally   Treatment: Patient  was instructed in and performed therapeutic exercises to develop strength, ROM, flexibility, balance and joint mobility. Patient performed therapeutic exercises consisting of the " "following      Leg press   Recumbent bike  Upright bike 8'  UE ergometer  Treadmill   Elliptical 1-2' - endurance compromised.       THERAPEUTIC EXERCISE 1-1 PT =  65 '  SUPINE   -heel cord stretch 5'   -DF/PF x50 knee flexed at 30   -inver/ever x50   -circles x40 ea  -ABCs   -posterioir tib and peroneal stretch 30" x 4 ea. W/strap     SIDELYING  -eversion / inversion x30    PRONE    STANDING.  -gastroc stretch 2'  -soleus stretch 2'   -Toe stretch 2'  -heel raises x20  -SL stand on airex 15" x6 // bars   -SL stand airex 30" VICKI   =SL stand on airex with fwd lean and back lean.   -tandem stand 15 " x8   +Squats   +Marching   +Step overs step ups     SITTING  -toe curls      x20  -toes stretch 1' x 2   -prostretch 2'  -inversion   x20  -DF knee flexed / knee extended x20ea  -anterior tib stretch 30" x 4 with DF / PF x15   +soleus stretch 2'   -self stretch ( ant tib, peroneal, post tib ) 10" x 12 ea       MANUAL THERAPY: joint mobs at the mid foot, subtalar and tibiotalar. 1-1 PT = 5 minutes.    MODALITY =   DIRECT EDUCATION:    Patient was issued HEP        Assessment  The patient is completing all activities as noted.  He is able to maintain unilateral standing without pain. NM response is compromised. Requires support to maintain unilateral standing. Will progress with CC activities and minimize seated events.   Medical Necessity is demonstrated by:   Unable/ limited ability  to participate in daily activities  Limited mobility, weight bearing status, diminished neuromuscular response and weakness limits function of effected part for some activities, Requires skilled supervision to complete and progress treatment interventions and HEP.  Pt demo good understanding of the education provided. Patient demonstrated good return demonstration of activities.Patient's tolerance to treatment was good. Patient will continue to benefit from skilled PTintervention.Patient is making progress towards established " goals.  New/Revised goals: no  Continue with established Plan of Care towards PT goals.   *  CMS Impairment/Limitation/Restriction for FOTO Lower Leg (w/o Knee) Survey  Status Limitation G-Code CMS Severity Modifier  Intake 30% 70%  Predicted 42% 58% Goal Status+ CK - At least 40 percent but less than 60 percent  12/1/2017 41% 59%  1/23/2018 38% 62%  2/8/2018 43% 57% Current Status CK - At least 40 percent but less than 60 percent  D/C Status CK **only report if this is discharge survey  +Based on FOTO predicted change score    PLAN     Certification Period: 2/6/2018to 5/6//2018  Recommended Treatment Plan: 2 times per week for 12 weeks: Manual Therapy, Neuromuscular Re-ed, Patient Education and Therapeutic Exercise  Other Recommendations: modalities PRN   Pt may be seen by PTA as part of the rehabilitation team.      Therapist: Yusuf Farooq, PT

## 2018-02-27 ENCOUNTER — OFFICE VISIT (OUTPATIENT)
Dept: ELECTROPHYSIOLOGY | Facility: CLINIC | Age: 73
End: 2018-02-27
Payer: MEDICARE

## 2018-02-27 ENCOUNTER — HOSPITAL ENCOUNTER (OUTPATIENT)
Dept: CARDIOLOGY | Facility: CLINIC | Age: 73
Discharge: HOME | End: 2018-02-27
Payer: MEDICARE

## 2018-02-27 VITALS
BODY MASS INDEX: 41.75 KG/M2 | HEIGHT: 73 IN | SYSTOLIC BLOOD PRESSURE: 180 MMHG | HEART RATE: 81 BPM | WEIGHT: 315 LBS | DIASTOLIC BLOOD PRESSURE: 71 MMHG

## 2018-02-27 DIAGNOSIS — E66.01 MORBID OBESITY WITH BMI OF 40.0-44.9, ADULT: ICD-10-CM

## 2018-02-27 DIAGNOSIS — Z09 S/P ORTHOPEDIC SURGERY, FOLLOW-UP EXAM: Primary | ICD-10-CM

## 2018-02-27 DIAGNOSIS — G47.33 OBSTRUCTIVE SLEEP APNEA ON CPAP: ICD-10-CM

## 2018-02-27 DIAGNOSIS — I25.10 CORONARY ARTERY DISEASE INVOLVING NATIVE CORONARY ARTERY OF NATIVE HEART WITHOUT ANGINA PECTORIS: ICD-10-CM

## 2018-02-27 DIAGNOSIS — I48.0 PAROXYSMAL ATRIAL FIBRILLATION: Primary | ICD-10-CM

## 2018-02-27 DIAGNOSIS — I10 ESSENTIAL HYPERTENSION: ICD-10-CM

## 2018-02-27 DIAGNOSIS — Z95.818 STATUS POST PLACEMENT OF IMPLANTABLE LOOP RECORDER: ICD-10-CM

## 2018-02-27 DIAGNOSIS — I73.9 PVD (PERIPHERAL VASCULAR DISEASE): ICD-10-CM

## 2018-02-27 PROCEDURE — 99213 OFFICE O/P EST LOW 20 MIN: CPT | Mod: S$GLB,,, | Performed by: INTERNAL MEDICINE

## 2018-02-27 PROCEDURE — 93000 ELECTROCARDIOGRAM COMPLETE: CPT | Mod: S$GLB,,, | Performed by: INTERNAL MEDICINE

## 2018-02-27 PROCEDURE — 3008F BODY MASS INDEX DOCD: CPT | Mod: S$GLB,,, | Performed by: INTERNAL MEDICINE

## 2018-02-27 PROCEDURE — 1126F AMNT PAIN NOTED NONE PRSNT: CPT | Mod: S$GLB,,, | Performed by: INTERNAL MEDICINE

## 2018-02-27 PROCEDURE — 1159F MED LIST DOCD IN RCRD: CPT | Mod: S$GLB,,, | Performed by: INTERNAL MEDICINE

## 2018-02-27 PROCEDURE — 99499 UNLISTED E&M SERVICE: CPT | Mod: S$GLB,,, | Performed by: INTERNAL MEDICINE

## 2018-02-27 PROCEDURE — 99999 PR PBB SHADOW E&M-EST. PATIENT-LVL III: CPT | Mod: PBBFAC,,, | Performed by: INTERNAL MEDICINE
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
## 2018-02-28 ENCOUNTER — OFFICE VISIT (OUTPATIENT)
Dept: OPHTHALMOLOGY | Facility: CLINIC | Age: 73
End: 2018-02-28
Payer: MEDICARE

## 2018-02-28 ENCOUNTER — CLINICAL SUPPORT (OUTPATIENT)
Dept: REHABILITATION | Facility: HOSPITAL | Age: 73
End: 2018-02-28
Attending: PHYSICIAN ASSISTANT
Payer: MEDICARE

## 2018-02-28 DIAGNOSIS — B00.52 HERPES SIMPLEX VIRUS STROMAL KERATITIS: Primary | ICD-10-CM

## 2018-02-28 DIAGNOSIS — S82.202F TYPE III OPEN FRACTURE OF LEFT TIBIA AND FIBULA WITH ROUTINE HEALING, SUBSEQUENT ENCOUNTER: Primary | ICD-10-CM

## 2018-02-28 DIAGNOSIS — S82.402F TYPE III OPEN FRACTURE OF LEFT TIBIA AND FIBULA WITH ROUTINE HEALING, SUBSEQUENT ENCOUNTER: Primary | ICD-10-CM

## 2018-02-28 DIAGNOSIS — Z74.09 IMPAIRED MOBILITY: ICD-10-CM

## 2018-02-28 DIAGNOSIS — R29.898 RIGHT LEG WEAKNESS: ICD-10-CM

## 2018-02-28 DIAGNOSIS — M67.01 HEEL CORD TIGHTNESS, RIGHT: ICD-10-CM

## 2018-02-28 DIAGNOSIS — M25.673 DECREASED RANGE OF MOTION OF ANKLE: ICD-10-CM

## 2018-02-28 DIAGNOSIS — R26.89 BALANCE PROBLEMS: ICD-10-CM

## 2018-02-28 PROCEDURE — 97110 THERAPEUTIC EXERCISES: CPT | Mod: PN | Performed by: PHYSICAL THERAPIST

## 2018-02-28 PROCEDURE — 99999 PR PBB SHADOW E&M-EST. PATIENT-LVL II: CPT | Mod: PBBFAC,,, | Performed by: OPHTHALMOLOGY

## 2018-02-28 PROCEDURE — 92014 COMPRE OPH EXAM EST PT 1/>: CPT | Mod: S$GLB,,, | Performed by: OPHTHALMOLOGY

## 2018-02-28 NOTE — PROGRESS NOTES
"                                          Physical Therapy Daily Note           Name: Janusz Montgomery Jr.  Clinic Number: [id number]  Date of Treatment: 02/28/2018   Diagnosis:   Encounter Diagnoses   Name Primary?    Type III open fracture of left tibia and fibula with routine healing, subsequent encounter Yes    Balance problems     Heel cord tightness, right     Impaired mobility     Decreased range of motion of ankle     Right leg weakness        Time in: 1412  Time Out: 1520  Total Treatment Time: 68  VISITS / PURDY: 20/20 - 12/31/2017  NEW:    BOLD=INDICATES ACTIVITIES PERFORMED.      Subjective  Patient states that he stopped wearing the walking boot to try to progress and see how it feels.     Pain level - 0/10 reported.     Objective    1/25/2018 -   Ankle   Left   Pain/Dysfunction with Movement     AROM PROM MMT     Plantarflexion 20  20 30  30 2-/5 Limited by weight bearing    Dorsiflexion  Knee extend.    Knee flexed        5  8  8  10    8  15  15  15 2+/5  Joint mobility and tissue extensibility limitation    Inversion 5  15 10  20 2+/5 Joint mobility and tissue extensibility limitation    Eversion 5  20 8  30 2+/5  Joint mobility and tissue extensibility limitation       Girth:                        L               1/25/2018     2" sup.med mall       10.75         10.25  Figure 8                    24              24  Mid foot                     11              11    Joint mobility assessment   Tibiotalar (talocrual) - soft tissue end point with improved quality but no increased motion.   Subtalar - abrupt end point with improved quality and no change in quantity of motion.   Talonavicular and Calcaneocuboid- some improved quality medially and slight change laterally   Treatment: Patient  was instructed in and performed therapeutic exercises to develop strength, ROM, flexibility, balance and joint mobility. Patient performed therapeutic exercises consisting of the following      Leg press " "  Recumbent bike  Upright bike 8'  UE ergometer  Treadmill   Elliptical 1-2' - endurance compromised.       THERAPEUTIC EXERCISE 1-1 PT = 30 '  SUPINE   -heel cord stretch 5'   -DF/PF x50 knee flexed at 30   -inver/ever x50   -circles x40 ea  -ABCs   -posterioir tib and peroneal stretch 30" x 4 ea. W/strap     SIDELYING  -eversion / inversion x30    PRONE    STANDING.  -gastroc stretch 2'  -soleus stretch 2'   -Toe stretch 2'  -heel raises x20  -SL stand on airex 15" x6 // bars   -SL stand airex 30" VICKI   =SL stand on airex with fwd lean and back lean.   -tandem stand 15 " x8   +Squats   +Marching   +Step overs  + step ups     SITTING  -toe curls      x20  -toes stretch 1' x 2   -prostretch 2'  -inversion   x20  -DF knee flexed / knee extended x20ea  -anterior tib stretch 30" x 4 with DF / PF x15   +soleus stretch 2'   -self stretch ( ant tib, peroneal, post tib ) 10" x 12 ea       MANUAL THERAPY: joint mobs at the mid foot, subtalar and tibiotalar. 1-1 PT = 5 minutes.    MODALITY =   DIRECT EDUCATION:    Patient was issued HEP        Assessment  Completed activities without difficulty. He did not demonstrate increased pain and was not limited with added CC activities.   Medical Necessity is demonstrated by:   Unable/ limited ability  to participate in daily activities  Limited mobility, weight bearing status, diminished neuromuscular response and weakness limits function of effected part for some activities, Requires skilled supervision to complete and progress treatment interventions and HEP.  Pt demo good understanding of the education provided. Patient demonstrated good return demonstration of activities.Patient's tolerance to treatment was good. Patient will continue to benefit from skilled PTintervention.Patient is making progress towards established goals.  New/Revised goals: no  Continue with established Plan of Care towards PT goals.   *  CMS Impairment/Limitation/Restriction for FOTO Lower Leg (w/o Knee) " Survey  Status Limitation G-Code CMS Severity Modifier  Intake 30% 70%  Predicted 42% 58% Goal Status+ CK - At least 40 percent but less than 60 percent  12/1/2017 41% 59%  1/23/2018 38% 62%  2/8/2018 43% 57% Current Status CK - At least 40 percent but less than 60 percent  D/C Status CK **only report if this is discharge survey  +Based on FOTO predicted change score    PLAN     Certification Period: 2/6/2018to 5/6//2018  Recommended Treatment Plan: 2 times per week for 12 weeks: Manual Therapy, Neuromuscular Re-ed, Patient Education and Therapeutic Exercise  Other Recommendations: modalities PRN   Pt may be seen by PTA as part of the rehabilitation team.      Therapist: Yusuf Farooq, PT

## 2018-02-28 NOTE — PROGRESS NOTES
HPI     Eye Problem    Additional comments: Right Eye.            Comments   72 y/o male presents for HSV Evaluation.  Pt states he has had HSV   Flare-ups since the age of 22.  Alternates eyes.  Has had a flare-up OD x   1 wk.  Dr Tobias started Viroptic and Prednisolone last week.  Somewhat   improved.  Still photophobic.   Has been on Acyclovir for the last two   years and find that has helped lessen the episodes dramatically.     Former patient of Dr Barron.     Acyclovir 800 mg qd po  Viroptic 5 times a day OD  Prednislone qid OD        Last edited by Narda Salas on 2/28/2018 10:23 AM. (History)            Assessment /Plan     For exam results, see Encounter Report.    Herpes simplex virus stromal keratitis      Hx recurrent HSV, tx for 1 week  Increase oral to 800 bid,  PF qid long slow taper over 6 weeks.

## 2018-03-03 DIAGNOSIS — R35.0 URINARY FREQUENCY: ICD-10-CM

## 2018-03-05 RX ORDER — TAMSULOSIN HYDROCHLORIDE 0.4 MG/1
CAPSULE ORAL
Qty: 90 CAPSULE | Refills: 3 | Status: SHIPPED | OUTPATIENT
Start: 2018-03-05 | End: 2019-03-21 | Stop reason: SDUPTHER

## 2018-03-06 ENCOUNTER — CLINICAL SUPPORT (OUTPATIENT)
Dept: REHABILITATION | Facility: HOSPITAL | Age: 73
End: 2018-03-06
Attending: PHYSICIAN ASSISTANT
Payer: MEDICARE

## 2018-03-06 DIAGNOSIS — S82.402F TYPE III OPEN FRACTURE OF LEFT TIBIA AND FIBULA WITH ROUTINE HEALING, SUBSEQUENT ENCOUNTER: Primary | ICD-10-CM

## 2018-03-06 DIAGNOSIS — M25.60 RESTRICTION OF JOINT MOTION: ICD-10-CM

## 2018-03-06 DIAGNOSIS — S82.202F TYPE III OPEN FRACTURE OF LEFT TIBIA AND FIBULA WITH ROUTINE HEALING, SUBSEQUENT ENCOUNTER: Primary | ICD-10-CM

## 2018-03-06 DIAGNOSIS — Z74.09 IMPAIRED MOBILITY: ICD-10-CM

## 2018-03-06 DIAGNOSIS — M25.673 DECREASED RANGE OF MOTION OF ANKLE: ICD-10-CM

## 2018-03-06 DIAGNOSIS — M67.01 HEEL CORD TIGHTNESS, RIGHT: ICD-10-CM

## 2018-03-06 DIAGNOSIS — R26.89 BALANCE PROBLEMS: ICD-10-CM

## 2018-03-06 DIAGNOSIS — R29.898 RIGHT LEG WEAKNESS: ICD-10-CM

## 2018-03-06 PROCEDURE — 97110 THERAPEUTIC EXERCISES: CPT | Mod: PN | Performed by: PHYSICAL THERAPIST

## 2018-03-06 NOTE — PROGRESS NOTES
"                                          Physical Therapy Daily Note           Name: Janusz Montgomery Jr.  Clinic Number: [id number]  Date of Treatment: 03/06/2018   Diagnosis:   Encounter Diagnoses   Name Primary?    Type III open fracture of left tibia and fibula with routine healing, subsequent encounter Yes    Balance problems     Impaired mobility     Heel cord tightness, right     Decreased range of motion of ankle     Right leg weakness     Restriction of joint motion        Time in: 1515  Time Out: 1615  Total Treatment Time: 60  VISITS / PURDY: 20/20 - 12/31/2017  NEW:    BOLD=INDICATES ACTIVITIES PERFORMED.      Subjective  Patient states the ankle is doing fine. There has not been any pain. The gait is not smooth but no pain.     Pain level - 0/10 reported.     Objective    1/25/2018 -   Ankle   Left   Pain/Dysfunction with Movement     AROM PROM MMT     Plantarflexion 20  20 30  30 2-/5 Limited by weight bearing    Dorsiflexion  Knee extend.    Knee flexed        5  8  8  10    8  15  15  15 2+/5  Joint mobility and tissue extensibility limitation    Inversion 5  15 10  20 2+/5 Joint mobility and tissue extensibility limitation    Eversion 5  20 8  30 2+/5  Joint mobility and tissue extensibility limitation       Girth:                        L               1/25/2018     2" sup.med mall       10.75         10.25  Figure 8                    24              24  Mid foot                     11              11    Joint mobility assessment   Tibiotalar (talocrual) - soft tissue end point with improved quality but no increased motion.   Subtalar - abrupt end point with improved quality and no change in quantity of motion.   Talonavicular and Calcaneocuboid- some improved quality medially and slight change laterally   Treatment: Patient  was instructed in and performed therapeutic exercises to develop strength, ROM, flexibility, balance and joint mobility. Patient performed therapeutic exercises consisting " "of the following      Leg press   Recumbent bike  Upright bike 8'  UE ergometer  Treadmill   Elliptical 1-2' - endurance compromised.       THERAPEUTIC EXERCISE 1-1 PT = 30 '  SUPINE   -heel cord stretch 5'   -DF/PF x50 knee flexed at 30   -inver/ever x50   -circles x40 ea  -ABCs   -posterioir tib and peroneal stretch 30" x 4 ea. W/strap     SIDELYING  -eversion / inversion x30    PRONE    STANDING.  -gastroc stretch 2'  -soleus stretch 2'   -Toe stretch 2'  -heel raises x20  -SL stand on airex 15" x6 // bars   -SL stand airex 30" VICKI   =SL stand on airex with fwd lean and back lean.   -tandem stand 15 " x8   +Squats   +Marching   +Step overs  + step ups     SITTING  -toe curls      x20  -toes stretch 1' x 2   -prostretch 2'  -inversion   x20  -DF knee flexed / knee extended x20ea  -anterior tib stretch 30" x 4 with DF / PF x15   +soleus stretch 2'   -self stretch ( ant tib, peroneal, post tib ) 10" x 12 ea       MANUAL THERAPY: joint mobs at the mid foot, subtalar and tibiotalar. NT.    MODALITY = ice at end of session.   DIRECT EDUCATION:    Patient was issued HEP        Assessment  FOTO NEXT SESSION   Can feel the stretch and the muscle activity in the left leg. No pain was experienced. No limitations with activity. Will reassess mobility next session.   Medical Necessity is demonstrated by:   Unable/ limited ability  to participate in daily activities  Limited mobility, weight bearing status, diminished neuromuscular response and weakness limits function of effected part for some activities, Requires skilled supervision to complete and progress treatment interventions and HEP.  Pt demo good understanding of the education provided. Patient demonstrated good return demonstration of activities.Patient's tolerance to treatment was good. Patient will continue to benefit from skilled PTintervention.Patient is making progress towards established goals.  New/Revised goals: no  Continue with established Plan of Care " towards PT goals.   *  CMS Impairment/Limitation/Restriction for FOTO Lower Leg (w/o Knee) Survey  Status Limitation G-Code CMS Severity Modifier  Intake 30% 70%  Predicted 42% 58% Goal Status+ CK - At least 40 percent but less than 60 percent  12/1/2017 41% 59%  1/23/2018 38% 62%  2/8/2018 43% 57% Current Status CK - At least 40 percent but less than 60 percent  D/C Status CK **only report if this is discharge survey  +Based on FOTO predicted change score    PLAN     Certification Period: 2/6/2018to 5/6//2018  Recommended Treatment Plan: 2 times per week for 12 weeks: Manual Therapy, Neuromuscular Re-ed, Patient Education and Therapeutic Exercise  Other Recommendations: modalities PRN   Pt may be seen by PTA as part of the rehabilitation team.      Therapist: Yusuf Farooq, PT

## 2018-03-08 ENCOUNTER — OFFICE VISIT (OUTPATIENT)
Dept: FAMILY MEDICINE | Facility: CLINIC | Age: 73
End: 2018-03-08
Payer: MEDICARE

## 2018-03-08 ENCOUNTER — CLINICAL SUPPORT (OUTPATIENT)
Dept: REHABILITATION | Facility: HOSPITAL | Age: 73
End: 2018-03-08
Attending: PHYSICIAN ASSISTANT
Payer: MEDICARE

## 2018-03-08 VITALS
TEMPERATURE: 98 F | BODY MASS INDEX: 41.75 KG/M2 | HEART RATE: 95 BPM | SYSTOLIC BLOOD PRESSURE: 140 MMHG | WEIGHT: 315 LBS | OXYGEN SATURATION: 95 % | DIASTOLIC BLOOD PRESSURE: 80 MMHG | HEIGHT: 73 IN

## 2018-03-08 DIAGNOSIS — R26.89 BALANCE PROBLEMS: ICD-10-CM

## 2018-03-08 DIAGNOSIS — S82.202F TYPE III OPEN FRACTURE OF LEFT TIBIA AND FIBULA WITH ROUTINE HEALING, SUBSEQUENT ENCOUNTER: Primary | ICD-10-CM

## 2018-03-08 DIAGNOSIS — M25.60 RESTRICTION OF JOINT MOTION: ICD-10-CM

## 2018-03-08 DIAGNOSIS — E66.01 MORBID OBESITY WITH BMI OF 40.0-44.9, ADULT: Primary | ICD-10-CM

## 2018-03-08 DIAGNOSIS — M67.01 HEEL CORD TIGHTNESS, RIGHT: ICD-10-CM

## 2018-03-08 DIAGNOSIS — E78.5 HYPERLIPIDEMIA, UNSPECIFIED HYPERLIPIDEMIA TYPE: ICD-10-CM

## 2018-03-08 DIAGNOSIS — I10 ESSENTIAL HYPERTENSION: ICD-10-CM

## 2018-03-08 DIAGNOSIS — M25.673 DECREASED RANGE OF MOTION OF ANKLE: ICD-10-CM

## 2018-03-08 DIAGNOSIS — Z74.09 IMPAIRED MOBILITY: ICD-10-CM

## 2018-03-08 DIAGNOSIS — I48.0 PAROXYSMAL ATRIAL FIBRILLATION: ICD-10-CM

## 2018-03-08 DIAGNOSIS — S82.402F TYPE III OPEN FRACTURE OF LEFT TIBIA AND FIBULA WITH ROUTINE HEALING, SUBSEQUENT ENCOUNTER: Primary | ICD-10-CM

## 2018-03-08 DIAGNOSIS — R29.898 RIGHT LEG WEAKNESS: ICD-10-CM

## 2018-03-08 PROCEDURE — 99999 PR PBB SHADOW E&M-EST. PATIENT-LVL III: CPT | Mod: PBBFAC,,, | Performed by: FAMILY MEDICINE

## 2018-03-08 PROCEDURE — 99215 OFFICE O/P EST HI 40 MIN: CPT | Mod: S$GLB,,, | Performed by: FAMILY MEDICINE

## 2018-03-08 PROCEDURE — 97110 THERAPEUTIC EXERCISES: CPT | Mod: PN | Performed by: PHYSICAL THERAPIST

## 2018-03-08 PROCEDURE — 99499 UNLISTED E&M SERVICE: CPT | Mod: S$GLB,,, | Performed by: FAMILY MEDICINE

## 2018-03-08 PROCEDURE — 3079F DIAST BP 80-89 MM HG: CPT | Mod: CPTII,S$GLB,, | Performed by: FAMILY MEDICINE

## 2018-03-08 PROCEDURE — 3077F SYST BP >= 140 MM HG: CPT | Mod: CPTII,S$GLB,, | Performed by: FAMILY MEDICINE

## 2018-03-08 RX ORDER — PHENTERMINE HYDROCHLORIDE 37.5 MG/1
37.5 TABLET ORAL
Qty: 30 TABLET | Refills: 0 | Status: SHIPPED | OUTPATIENT
Start: 2018-03-08 | End: 2018-04-07

## 2018-03-08 RX ORDER — PHENTERMINE HYDROCHLORIDE 37.5 MG/1
37.5 TABLET ORAL
Qty: 30 TABLET | Refills: 0 | Status: SHIPPED | OUTPATIENT
Start: 2018-03-08 | End: 2018-03-08 | Stop reason: SDUPTHER
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
## 2018-03-08 NOTE — PATIENT INSTRUCTIONS
1.  Casein based protein for meal replacement   2.  Plant based protein for snacks closer to bed time.

## 2018-03-08 NOTE — PROGRESS NOTES
"                                          Physical Therapy Daily Note           Name: Janusz Montgomery Jr.  Clinic Number: [id number]  Date of Treatment: 03/08/2018   Diagnosis:   Encounter Diagnoses   Name Primary?    Type III open fracture of left tibia and fibula with routine healing, subsequent encounter Yes    Impaired mobility     Balance problems     Heel cord tightness, right     Decreased range of motion of ankle     Right leg weakness     Restriction of joint motion        Time in: 1512  Time Out: 1615  Total Treatment Time: 63  VISITS / PURDY: 20/20 - 12/31/2017  NEW:    BOLD=INDICATES ACTIVITIES PERFORMED.      Subjective  Patient states he has no pain at the ankle or foot.  Feeling things in other areas now that Hamzah walking without the boot. Doing well.     Pain level - 0/10 reported.     Objective    1/25/2018 -   Ankle   Left   Pain/Dysfunction with Movement     AROM PROM MMT     Plantarflexion 20  20 30  30 2-/5 Limited by weight bearing    Dorsiflexion  Knee extend.    Knee flexed        5  8  8  10    8  15  15  15 2+/5  Joint mobility and tissue extensibility limitation    Inversion 5  15 10  20 2+/5 Joint mobility and tissue extensibility limitation    Eversion 5  20 8  30 2+/5  Joint mobility and tissue extensibility limitation       Girth:                        L               1/25/2018     2" sup.med mall       10.75         10.25  Figure 8                    24              24  Mid foot                     11              11    Joint mobility assessment   Tibiotalar (talocrual) - soft tissue end point with improved quality but no increased motion.   Subtalar - abrupt end point with improved quality and no change in quantity of motion.   Talonavicular and Calcaneocuboid- some improved quality medially and slight change laterally   Treatment: Patient  was instructed in and performed therapeutic exercises to develop strength, ROM, flexibility, balance and joint mobility. Patient performed " "therapeutic exercises consisting of the following      Leg press   Recumbent bike  Upright bike 8'  UE ergometer  Treadmill   Elliptical 1-2' - endurance compromised.       THERAPEUTIC EXERCISE 1-1 PT = 30 '  SUPINE   -heel cord stretch 5'   -DF/PF x50 knee flexed at 30   -inver/ever x50   -circles x40 ea  -ABCs   -posterioir tib and peroneal stretch 30" x 4 ea. W/strap     SIDELYING  -eversion / inversion x30    PRONE    STANDING.  -gastroc stretch 2'  -soleus stretch 2'   -Toe stretch 2'  -heel raises x20  -SL stand on airex 15" x6 // bars   -SL stand airex 30" VICKI   =SL stand on airex with fwd lean and back lean.   -tandem stand 15 " x8   -Squats   -Marching   -step overs  -step ups     SITTING  -toe curls      x20  -toes stretch 1' x 2   -prostretch 2'  -inversion   x20  -DF knee flexed / knee extended x20ea  -anterior tib stretch 30" x 4 with DF / PF x15   +soleus stretch 2'   -self stretch ( ant tib, peroneal, post tib ) 10" x 12 ea       MANUAL THERAPY: joint mobs at the mid foot, subtalar and tibiotalar. 1-1 PT = 7min .    MODALITY = ice at end of session.   DIRECT EDUCATION:    Patient was issued HEP        Assessment     Session completed without difficult, however he did encounter some slight tingling along the lateral leg following unilateral standing.  He is progressing satisfactorily without the walking boot. Passive mobility of the tibiotalar joint is noted with passive joint mobs.   Medical Necessity is demonstrated by:   Unable/ limited ability  to participate in daily activities  Limited mobility, weight bearing status, diminished neuromuscular response and weakness limits function of effected part for some activities, Requires skilled supervision to complete and progress treatment interventions and HEP.  Pt demo good understanding of the education provided. Patient demonstrated good return demonstration of activities.Patient's tolerance to treatment was good. Patient will continue to benefit from " skilled PTintervention.Patient is making progress towards established goals.  New/Revised goals: no  Continue with established Plan of Care towards PT goals.   *  CMS Impairment/Limitation/Restriction for FOTO Lower Leg (w/o Knee) Survey  Status Limitation G-Code CMS Severity Modifier  Intake 30% 70%  Predicted 42% 58% Goal Status+ CK - At least 40 percent but less than 60 percent  12/1/2017 41% 59%  1/23/2018 38% 62%  2/8/2018 43% 57% Current Status CK - At least 40 percent but less than 60 percent  D/C Status CK **only report if this is discharge survey  +Based on FOTO predicted change score    PLAN     Certification Period: 2/6/2018to 5/6//2018  Recommended Treatment Plan: 2 times per week for 12 weeks: Manual Therapy, Neuromuscular Re-ed, Patient Education and Therapeutic Exercise  Other Recommendations: modalities PRN   Pt may be seen by PTA as part of the rehabilitation team.      Therapist: Yusuf Farooq, PT

## 2018-03-08 NOTE — ASSESSMENT & PLAN NOTE
Unlimited green vegetables, meat, poultry, seafood, eggs and hard cheeses.   Avoid fried foods  Dressings, seasonings, condiments, etc should have less than 2 g sugars.   beans or nuts can have 1 x a day.   2-3 servings of citrus fruits, berries, pineapple or melon a day (1 cup)  Milk and plain yogurt     No soda, sweet tea, juices or lemonade     Exercise 20 min 3 times a week this month     Patient warned of common side effects of phentermine including anxiety, insomnia, palpitations and increased blood pressure. It was also explained that it is for short-term usage along with diet and exercise, and that stopping the medication without making lifestyle changes will result in regain of weight. Patient states understanding.     Start phentermine with 1/2 pill a day to see if that will control your appetite. Go up to a full pill when needed.         Weight loss medications are controlled substances. They require routine follow up. Prescription or pills that are lost or destroyed will not be replaced.

## 2018-03-12 ENCOUNTER — CLINICAL SUPPORT (OUTPATIENT)
Dept: REHABILITATION | Facility: HOSPITAL | Age: 73
End: 2018-03-12
Attending: PHYSICIAN ASSISTANT
Payer: MEDICARE

## 2018-03-12 DIAGNOSIS — M25.673 DECREASED RANGE OF MOTION OF ANKLE: ICD-10-CM

## 2018-03-12 DIAGNOSIS — S82.202F TYPE III OPEN FRACTURE OF LEFT TIBIA AND FIBULA WITH ROUTINE HEALING, SUBSEQUENT ENCOUNTER: Primary | ICD-10-CM

## 2018-03-12 DIAGNOSIS — R29.898 RIGHT LEG WEAKNESS: ICD-10-CM

## 2018-03-12 DIAGNOSIS — S82.402F TYPE III OPEN FRACTURE OF LEFT TIBIA AND FIBULA WITH ROUTINE HEALING, SUBSEQUENT ENCOUNTER: Primary | ICD-10-CM

## 2018-03-12 DIAGNOSIS — R26.89 BALANCE PROBLEMS: ICD-10-CM

## 2018-03-12 DIAGNOSIS — M25.60 RESTRICTION OF JOINT MOTION: ICD-10-CM

## 2018-03-12 DIAGNOSIS — M67.01 HEEL CORD TIGHTNESS, RIGHT: ICD-10-CM

## 2018-03-12 DIAGNOSIS — Z74.09 IMPAIRED MOBILITY: ICD-10-CM

## 2018-03-12 PROCEDURE — 97110 THERAPEUTIC EXERCISES: CPT | Mod: PN | Performed by: PHYSICAL THERAPIST

## 2018-03-12 NOTE — PROGRESS NOTES
"                                          Physical Therapy Daily Note           Name: Janusz Montgomery Jr.  Clinic Number: [id number]  Date of Treatment: 03/12/2018   Diagnosis:   Encounter Diagnoses   Name Primary?    Type III open fracture of left tibia and fibula with routine healing, subsequent encounter Yes    Impaired mobility     Balance problems     Heel cord tightness, right     Decreased range of motion of ankle     Right leg weakness     Restriction of joint motion        Time in: 1445  Time Out: 1600  Total Treatment Time: 75  VISITS / PURDY: 20/20 - 12/31/2017  NEW:    BOLD=INDICATES ACTIVITIES PERFORMED.      Subjective  Patient states that he is not having any pain in the foot / ankle walking without the boot. The movement has improved.   Pain level - 0/10 reported.     Objective    1/25/2018 -   Ankle   Left   Pain/Dysfunction with Movement     AROM PROM MMT     Plantarflexion 20  20 30  30 2-/5 Limited by weight bearing    Dorsiflexion  Knee extend.    Knee flexed        5  8  8  10    8  15  15  15 2+/5  Joint mobility and tissue extensibility limitation    Inversion 5  15 10  20 2+/5 Joint mobility and tissue extensibility limitation    Eversion 5  20 8  30 2+/5  Joint mobility and tissue extensibility limitation       Girth:                        L               1/25/2018     2" sup.med mall       10.75         10.25  Figure 8                    24              24  Mid foot                     11              11    Joint mobility assessment   Tibiotalar (talocrual) - soft tissue end point with improved quality but no increased motion.   Subtalar - abrupt end point with improved quality and no change in quantity of motion.   Talonavicular and Calcaneocuboid- some improved quality medially and slight change laterally   Treatment: Patient  was instructed in and performed therapeutic exercises to develop strength, ROM, flexibility, balance and joint mobility. Patient performed therapeutic exercises " "consisting of the following    Shuttle 10'   Leg press   Recumbent bike  Upright bike 8'  UE ergometer  Treadmill   Elliptical 1-2' - endurance compromised.       THERAPEUTIC EXERCISE 1-1 PT = 75 '  SUPINE   -heel cord stretch 5'   -DF/PF x50 knee flexed at 30   -inver/ever x50   -circles x40 ea  -ABCs   -posterioir tib and peroneal stretch 30" x 4 ea. W/strap     SIDELYING  -eversion / inversion x30    PRONE    STANDING.  -gastroc stretch 2'  -soleus stretch 2'   -Toe stretch 2'  -heel raises x20  -SL stand on airex 15" x 8 at wall (forearm support)  -SL stand airex 30" VICKI   =SL stand on airex  -tandem stand 15 " x8   -Squats x20  -Marching 2'  -rocker board 2'   -step overs fwd 2' ea. Leg .   -step ups 2'    SITTING  -toe curls      x20  -toes stretch 1' x 2   -prostretch 2'  -inversion   x20  -DF knee flexed / knee extended x20ea  -anterior tib stretch 30" x 4 with DF / PF x15   +soleus stretch 2'   -self stretch ( ant tib, peroneal, post tib ) 10" x 12 ea       MANUAL THERAPY: joint mobs at the mid foot, subtalar and tibiotalar. 1-1 PT = 7min .    MODALITY =   DIRECT EDUCATION:    Patient was issued HEP        Assessment  FOTO NEXT SESSION   Patient was able to demonstrate satisfactory stability with unilateral standing and one hand support.  Performed step overs without signs of instability. He did not encounter any pain. Joint mobility unchanged.   Medical Necessity is demonstrated by:   Unable/ limited ability  to participate in daily activities  Limited mobility, weight bearing status, diminished neuromuscular response and weakness limits function of effected part for some activities, Requires skilled supervision to complete and progress treatment interventions and HEP.  Pt demo good understanding of the education provided. Patient demonstrated good return demonstration of activities.Patient's tolerance to treatment was good. Patient will continue to benefit from skilled PTintervention.Patient is making " progress towards established goals.  New/Revised goals: no  Continue with established Plan of Care towards PT goals.   *  CMS Impairment/Limitation/Restriction for FOTO Lower Leg (w/o Knee) Survey  Status Limitation G-Code CMS Severity Modifier  Intake 30% 70%  Predicted 42% 58% Goal Status+ CK - At least 40 percent but less than 60 percent  12/1/2017 41% 59%  1/23/2018 38% 62%  2/8/2018 43% 57% Current Status CK - At least 40 percent but less than 60 percent  D/C Status CK **only report if this is discharge survey  +Based on FOTO predicted change score    PLAN     Certification Period: 2/6/2018to 5/6//2018  Recommended Treatment Plan: 2 times per week for 12 weeks: Manual Therapy, Neuromuscular Re-ed, Patient Education and Therapeutic Exercise  Other Recommendations: modalities PRN   Pt may be seen by PTA as part of the rehabilitation team.      Therapist: Yusuf Farooq, PT

## 2018-03-13 ENCOUNTER — OFFICE VISIT (OUTPATIENT)
Dept: PODIATRY | Facility: CLINIC | Age: 73
End: 2018-03-13
Payer: MEDICARE

## 2018-03-13 VITALS — BODY MASS INDEX: 41.75 KG/M2 | HEIGHT: 73 IN | WEIGHT: 315 LBS

## 2018-03-13 DIAGNOSIS — Z87.2 HEALED ULCER OF LEFT FOOT ON EXAMINATION: ICD-10-CM

## 2018-03-13 DIAGNOSIS — I73.9 PVD (PERIPHERAL VASCULAR DISEASE): Primary | ICD-10-CM

## 2018-03-13 DIAGNOSIS — L90.9 PLANTAR FAT PAD ATROPHY: ICD-10-CM

## 2018-03-13 PROCEDURE — 99999 PR PBB SHADOW E&M-EST. PATIENT-LVL III: CPT | Mod: PBBFAC,,, | Performed by: PODIATRIST

## 2018-03-13 PROCEDURE — 99499 UNLISTED E&M SERVICE: CPT | Mod: S$GLB,,, | Performed by: PODIATRIST

## 2018-03-13 PROCEDURE — 99213 OFFICE O/P EST LOW 20 MIN: CPT | Mod: S$GLB,,, | Performed by: PODIATRIST

## 2018-03-13 NOTE — PROGRESS NOTES
Subjective:      Patient ID: Janusz Montgomery Jr. is a 73 y.o. male.    Chief Complaint: Wound Care (left heel Pcp Dr. Weathers 2/7/18 ) and Follow-up    Janusz Montgomery Jr. is a 73 y.o. male returns to clinic for follow up of left foot ulcers.  Patient's wife has been inspecting foot daily.  He relates callus formation where wound was present and concern on if ulceration may be present beneath callus.   He relates occasional pain to the heel during therapy sessions. Patient has been moisturizing skin daily.       Shoe gear:  Tennis shoes    Chief Complaint   Patient presents with    Wound Care     left heel Pcp Dr. Weathers 2/7/18     Follow-up       Hemoglobin A1C   Date Value Ref Range Status   07/11/2016 5.3 4.5 - 6.2 % Final     Comment:     According to ADA guidelines, hemoglobin A1C <7.0% represents  optimal control in non-pregnant diabetic patients.  Different  metrics may apply to specific populations.   Standards of Medical Care in Diabetes - 2016.  For the purpose of screening for the presence of diabetes:  <5.7%     Consistent with the absence of diabetes  5.7-6.4%  Consistent with increasing risk for diabetes   (prediabetes)  >or=6.5%  Consistent with diabetes  Currently no consensus exists for use of hemoglobin A1C  for diagnosis of diabetes for children.     03/31/2015 5.3 4.5 - 6.2 % Final   03/03/2014 5.6 4.5 - 6.2 % Final         Current Outpatient Prescriptions on File Prior to Visit   Medication Sig Dispense Refill    acyclovir (ZOVIRAX) 800 MG Tab Take 1 tablet (800 mg total) by mouth once daily. 90 tablet 4    apixaban 5 mg Tab Take 1 tablet (5 mg total) by mouth 2 (two) times daily. 60 tablet 11    artificial tears (ISOPTO TEARS) 0.5 % ophthalmic solution Place 1 drop into both eyes as needed. (Patient taking differently: Place 1 drop into both eyes as needed (for dry eyes). )      aspirin (ECOTRIN) 81 MG EC tablet Take 1 tablet (81 mg total) by mouth once daily.  0    atorvastatin  (LIPITOR) 40 MG tablet Take 1 tablet (40 mg total) by mouth once daily. 90 tablet 0    CYANOCOBALAMIN, VITAMIN B-12, (VITAMIN B-12 ORAL) Take 2,500 mcg by mouth once daily.      furosemide (LASIX) 20 MG tablet Take 1 tablet (20 mg total) by mouth once daily. 90 tablet 0    gabapentin (NEURONTIN) 300 MG capsule TAKE ONE CAPSULE BY MOUTH THREE TIMES DAILY 90 capsule 11    melatonin 3 mg Tab Take 6 mg by mouth nightly as needed (for sleep).       omeprazole (PRILOSEC) 20 MG capsule Take 1 capsule (20 mg total) by mouth daily as needed. 90 capsule 0    phentermine (ADIPEX-P) 37.5 mg tablet Take 1 tablet (37.5 mg total) by mouth before breakfast. 30 tablet 0    prednisoLONE acetate (PRED FORTE) 1 % DrpS Place 1 drop into the right eye 4 (four) times daily. 1 Bottle 2    ramipril (ALTACE) 5 MG capsule Take 1 capsule (5 mg total) by mouth once daily. 90 capsule 1    tamsulosin (FLOMAX) 0.4 mg Cp24 Take 1 capsule (0.4 mg total) by mouth once daily. 90 capsule 0    tamsulosin (FLOMAX) 0.4 mg Cp24 TAKE ONE CAPSULE BY MOUTH ONCE DAILY 90 capsule 3    trifluridine (VIROPTIC) 1 % ophthalmic solution Place 1 drop into both eyes 5 (five) times daily. 7.5 mL 1    venlafaxine (EFFEXOR) 75 MG tablet Take 2 tablets (150 mg total) by mouth 2 (two) times daily. 360 tablet 1    vitamin D 1000 units Tab Take 1 tablet (1,000 Units total) by mouth once daily.      zinc sulfate (ZINCATE) 220 (50) mg capsule Take 1 capsule (220 mg total) by mouth once daily.       No current facility-administered medications on file prior to visit.        Allergies   Allergen Reactions    Ciprofloxacin Rash     Diffuse pruritic morbilliform rash developed 3/15/2017 after dose of cipro; previously in 2/2017 he had rash/fevers after initiation of cipro    Zosyn [Piperacillin-Tazobactam] Anaphylaxis     Diffuse pruritic morbilliform rash developed 3/15/2017.  Then, 430am dose on 3/16 and rash worsened with SOB/tachypnea but no hypoxemia.      Bacitracin Itching and Rash     Violaceous rash in area of topical Tx.        Past Surgical History:   Procedure Laterality Date    Cardiac stenting x2      CATARACT EXTRACTION W/  INTRAOCULAR LENS IMPLANT Right 3/29/2016    Dr. Conteh    CATARACT EXTRACTION W/  INTRAOCULAR LENS IMPLANT Left 4/12/2016        EXTERNAL FIXATION TIBIAL FRACTURE Left 07/01/2016    ORIF TIBIA FRACTURE Left 07/15/2016    Squamous cell cancer removal x3 with Mohs surgery      TONSILLECTOMY      TOTAL KNEE ARTHROPLASTY  10/2012    trus/bx         Family History   Problem Relation Age of Onset    Skin cancer Father     Lung cancer Father     Cancer Father      smoker,     Alzheimer's disease Mother     Hypertension Mother     Cancer Sister      colon, lung cancer     Cancer Brother      skin cancer, polypectomy     Peripheral vascular disease      Melanoma Neg Hx     Psoriasis Neg Hx     Lupus Neg Hx     Eczema Neg Hx     Amblyopia Neg Hx     Blindness Neg Hx     Cataracts Neg Hx     Diabetes Neg Hx     Glaucoma Neg Hx     Macular degeneration Neg Hx     Retinal detachment Neg Hx     Strabismus Neg Hx     Stroke Neg Hx     Thyroid disease Neg Hx     Acne Neg Hx        Social History     Social History    Marital status:      Spouse name: N/A    Number of children: N/A    Years of education: N/A     Occupational History    Retired       Social History Main Topics    Smoking status: Never Smoker    Smokeless tobacco: Never Used    Alcohol use Yes      Comment: occasionally    Drug use: No    Sexual activity: Yes     Other Topics Concern    Not on file     Social History Narrative    No narrative on file       Review of Systems   Constitution: Negative for chills, fever and weakness.   Cardiovascular: Negative for claudication and leg swelling.   Respiratory: Negative for cough and shortness of breath.    Skin: Positive for dry skin. Negative for itching, nail changes and rash.  "  Musculoskeletal: Positive for arthritis, joint pain and myalgias. Negative for falls, joint swelling and muscle weakness.   Gastrointestinal: Negative for diarrhea, nausea and vomiting.   Neurological: Positive for paresthesias. Negative for numbness and tremors.   Psychiatric/Behavioral: Negative for altered mental status and hallucinations.         Objective:       Vitals:    03/13/18 1444   Weight: (!) 161.9 kg (357 lb)   Height: 6' 1" (1.854 m)   PainSc: 0-No pain       Physical Exam   Constitutional:  Non-toxic appearance. He does not have a sickly appearance. No distress.   Pt. is well-developed, well-nourished, appears stated age, in no acute distress, alert and oriented x 3. No evidence of depression, anxiety, or agitation. Calm, cooperative, and communicative. Appropriate interactions and affect.   Cardiovascular:   Pulses:       Dorsalis pedis pulses are 2+ on the right side, and 2+ on the left side.        Posterior tibial pulses are 1+ on the right side, and 1+ on the left side.        Pulmonary/Chest: No respiratory distress.   Musculoskeletal:        Right ankle: He exhibits swelling. No tenderness. No lateral malleolus, no medial malleolus, no AITFL, no CF ligament, no head of 5th metatarsal and no proximal fibula tenderness found. Achilles tendon exhibits no pain, no defect and normal Zabala's test results.        Left ankle: He exhibits swelling. No tenderness. No lateral malleolus, no medial malleolus, no AITFL, no CF ligament and no posterior TFL tenderness found. Achilles tendon exhibits no pain, no defect and normal Zabala's test results.        Right foot: There is no tenderness and no bony tenderness.        Left foot: There is no tenderness and no bony tenderness.   Decreased stride, station of gait.  apropulsive toe off.  Increased angle and base of gait.    Patient has hammertoes of digits 2-5 bilateral partially reducible without symptom today.     There is equinus deformity " bilateral. There is limitation of dorsiflexion with knees extended and with knees flexed.    Shoes reveals lateral heel counter wear bilateral in athletic shoes.        Lymphadenopathy:   No lymphatic streaking    Negative lymphadenopathy bilateral popliteal fossa and tarsal tunnel.     Neurological:   [place]-Jenn 5.07 monofilament is intact bilateral feet. Sharp/dull sensation is also intact Bilateral feet. Proprioception is grossly intact. Vibratory sensation intact (pt able to sense vibration stop within 3-5 seconds)     Skin: Skin is warm and dry. Lesion and rash noted. No bruising, no burn and no laceration noted. He is not diaphoretic. There is erythema. No cyanosis. No pallor. Nails show no clubbing.   Ulcer location: posterior left heel  Signs of infection: local edema and erythema  Drainage:  None  Periwound: intact  Base: thin epithelial     Dermatitis and erythema noted to posterior left heel   Psychiatric: His mood appears not anxious. His affect is not inappropriate. His speech is not slurred. He is not combative. He is communicative. He is attentive.   Nursing note reviewed.      Assessment:       Encounter Diagnoses   Name Primary?    PVD (peripheral vascular disease) Yes    Healed ulcer of left foot on examination     Plantar fat pad atrophy          Plan:       Janusz was seen today for wound care and follow-up.    Diagnoses and all orders for this visit:    PVD (peripheral vascular disease)    Healed ulcer of left foot on examination    Plantar fat pad atrophy      I counseled the patient on his conditions, their implications and medical management.    The wound is cleansed of foreign material as much as possible. The patient is alerted to watch for any signs of infection (redness, pus, pain, increased swelling or fever) and call if such occurs.    Wound has remained healed but with significant callus formation.    I advised patient to check feet daily for signs of drainage or lesion  re-opening   Discussed use of daily foot moisturizer to feet, avoiding the webspace's    Follow-up:Patient is to return to the clinic in 2-3 months for follow-up but should call [facility] immediately if any signs of infection, such as fever, chills, sweats, increased redness or pain.    Short-term goals include maintaining good offloading and minimizing bioburden, promoting granulation and epithelialization to healing.  Long-term goals include keeping the wound healed by good offloading and medical management under the direction of internist.

## 2018-03-14 ENCOUNTER — CLINICAL SUPPORT (OUTPATIENT)
Dept: REHABILITATION | Facility: HOSPITAL | Age: 73
End: 2018-03-14
Attending: PHYSICIAN ASSISTANT
Payer: MEDICARE

## 2018-03-14 DIAGNOSIS — R26.89 BALANCE PROBLEMS: ICD-10-CM

## 2018-03-14 DIAGNOSIS — R29.898 RIGHT LEG WEAKNESS: ICD-10-CM

## 2018-03-14 DIAGNOSIS — M25.60 RESTRICTION OF JOINT MOTION: ICD-10-CM

## 2018-03-14 DIAGNOSIS — S82.402F TYPE III OPEN FRACTURE OF LEFT TIBIA AND FIBULA WITH ROUTINE HEALING, SUBSEQUENT ENCOUNTER: Primary | ICD-10-CM

## 2018-03-14 DIAGNOSIS — M67.01 HEEL CORD TIGHTNESS, RIGHT: ICD-10-CM

## 2018-03-14 DIAGNOSIS — Z74.09 IMPAIRED MOBILITY: ICD-10-CM

## 2018-03-14 DIAGNOSIS — S82.202F TYPE III OPEN FRACTURE OF LEFT TIBIA AND FIBULA WITH ROUTINE HEALING, SUBSEQUENT ENCOUNTER: Primary | ICD-10-CM

## 2018-03-14 DIAGNOSIS — M25.673 DECREASED RANGE OF MOTION OF ANKLE: ICD-10-CM

## 2018-03-14 PROCEDURE — 97110 THERAPEUTIC EXERCISES: CPT | Mod: PN | Performed by: PHYSICAL THERAPIST

## 2018-03-14 NOTE — PROGRESS NOTES
"                                          Physical Therapy Daily Note           Name: Janusz Montgomery Jr.  Clinic Number: [id number]  Date of Treatment: 03/14/2018   Diagnosis:   Encounter Diagnoses   Name Primary?    Type III open fracture of left tibia and fibula with routine healing, subsequent encounter Yes    Impaired mobility     Balance problems     Right leg weakness     Decreased range of motion of ankle     Heel cord tightness, right     Restriction of joint motion        Time in: 1410  Time Out: 1600  Total Treatment Time: 75  VISITS / PURDY: 20/20 - 12/31/2017  NEW:    BOLD=INDICATES ACTIVITIES PERFORMED.      Subjective  Patient states that he is not having any pain in the foot / ankle walking without the boot. The movement has improved.   Pain level - 0/10 reported.     Objective    1/25/2018 -   Ankle   Left   Pain/Dysfunction with Movement     AROM PROM MMT     Plantarflexion 20  20 30  30 2-/5 Limited by weight bearing    Dorsiflexion  Knee extend.    Knee flexed        5  8  8  10    8  15  15  15 2+/5  Joint mobility and tissue extensibility limitation    Inversion 5  15 10  20 2+/5 Joint mobility and tissue extensibility limitation    Eversion 5  20 8  30 2+/5  Joint mobility and tissue extensibility limitation       Girth:                        L               1/25/2018     2" sup.med mall       10.75         10.25  Figure 8                    24              24  Mid foot                     11              11    Joint mobility assessment   Tibiotalar (talocrual) - soft tissue end point with improved quality but no increased motion.   Subtalar - abrupt end point with improved quality and no change in quantity of motion.   Talonavicular and Calcaneocuboid- some improved quality medially and slight change laterally   Treatment: Patient  was instructed in and performed therapeutic exercises to develop strength, ROM, flexibility, balance and joint mobility. Patient performed therapeutic exercises " "consisting of the following    Shuttle 10'   Leg press   Recumbent bike  Upright bike 8'  UE ergometer  Treadmill   Elliptical 1-2' - endurance compromised.       THERAPEUTIC EXERCISE 1-1 PT = 30 '  SUPINE   -heel cord stretch 5'   -DF/PF x50 knee flexed at 30   -inver/ever x50   -circles x40 ea  -ABCs   -posterioir tib and peroneal stretch 30" x 4 ea. W/strap     SIDELYING  -eversion / inversion x30    PRONE    STANDING.  -gastroc stretch 2'  -soleus stretch 2'   -Toe stretch 2'  -heel raises x20  -SL stand on airex 15" x 8 at wall (forearm support)  -SL stand airex 30" VICKI   =SL stand on airex  -tandem stand 15 " x8   -Squats x20  -Marching 2'  -rocker board 2'   -step overs fwd 2' ea. Leg .   -step ups 2'  +divers stance.    SITTING  -toe curls      x20  -toes stretch 1' x 2   -prostretch 2'  -inversion   x20  -DF knee flexed / knee extended x20ea  -anterior tib stretch 30" x 4 with DF / PF x15   +soleus stretch 2'   -self stretch ( ant tib, peroneal, post tib ) 10" x 12 ea       MANUAL THERAPY: RT with I strip from the plantar foot over the achilles to the posterior leg.  Fan strips for edema over the anterior leg and dorsum of the foot.   MODALITY =   DIRECT EDUCATION:    Patient was issued HEP        Assessment  FOTO NEXT SESSION  Completing his activities w/o pain. There is residual edema in the leg and ankle. Progressing with strengthening and neuromuscular stanford for balance and stability. Focus on unilateral heel raises to enhance strengthening and assist with balance.   Medical Necessity is demonstrated by:   Unable/ limited ability  to participate in daily activities  Limited mobility, weight bearing status, diminished neuromuscular response and weakness limits function of effected part for some activities, Requires skilled supervision to complete and progress treatment interventions and HEP.  Pt demo good understanding of the education provided. Patient demonstrated good return demonstration of " activities.Patient's tolerance to treatment was good. Patient will continue to benefit from skilled PTintervention.Patient is making progress towards established goals.  New/Revised goals: no  Continue with established Plan of Care towards PT goals.   *  CMS Impairment/Limitation/Restriction for FOTO Lower Leg (w/o Knee) Survey  Status Limitation G-Code CMS Severity Modifier  Intake 30% 70%  Predicted 42% 58% Goal Status+ CK - At least 40 percent but less than 60 percent  12/1/2017 41% 59%  1/23/2018 38% 62%  2/8/2018 43% 57% Current Status CK - At least 40 percent but less than 60 percent  D/C Status CK **only report if this is discharge survey  +Based on FOTO predicted change score    PLAN     Certification Period: 2/6/2018to 5/6//2018  Recommended Treatment Plan: 2 times per week for 12 weeks: Manual Therapy, Neuromuscular Re-ed, Patient Education and Therapeutic Exercise  Other Recommendations: modalities PRN   Pt may be seen by PTA as part of the rehabilitation team.      Therapist: Yusuf Farooq, PT

## 2018-03-16 ENCOUNTER — TELEPHONE (OUTPATIENT)
Dept: ORTHOPEDICS | Facility: CLINIC | Age: 73
End: 2018-03-16

## 2018-03-16 NOTE — TELEPHONE ENCOUNTER
Spoke with pt's wife.   Advised of appointment for xray scheduled at 2:00 Tuesday but pt will come at 945 am for xray

## 2018-03-16 NOTE — TELEPHONE ENCOUNTER
----- Message from yC Kirk sent at 3/16/2018 10:36 AM CDT -----  Contact: Ms. Montgomery/wife  Ms. Montgomery was returning your call regarding the pt. Ms. Montgomery can be reached at [phone].

## 2018-03-20 ENCOUNTER — HOSPITAL ENCOUNTER (OUTPATIENT)
Dept: RADIOLOGY | Facility: HOSPITAL | Age: 73
Discharge: HOME | End: 2018-03-20
Attending: ORTHOPAEDIC SURGERY
Payer: MEDICARE

## 2018-03-20 ENCOUNTER — OFFICE VISIT (OUTPATIENT)
Dept: ORTHOPEDICS | Facility: CLINIC | Age: 73
End: 2018-03-20
Payer: MEDICARE

## 2018-03-20 VITALS — HEIGHT: 73 IN | BODY MASS INDEX: 41.75 KG/M2 | WEIGHT: 315 LBS

## 2018-03-20 DIAGNOSIS — Z09 S/P ORTHOPEDIC SURGERY, FOLLOW-UP EXAM: ICD-10-CM

## 2018-03-20 DIAGNOSIS — S82.402F TYPE III OPEN FRACTURE OF LEFT TIBIA AND FIBULA WITH ROUTINE HEALING: Primary | ICD-10-CM

## 2018-03-20 DIAGNOSIS — S82.202F TYPE III OPEN FRACTURE OF LEFT TIBIA AND FIBULA WITH ROUTINE HEALING: Primary | ICD-10-CM

## 2018-03-20 PROCEDURE — 99999 PR PBB SHADOW E&M-EST. PATIENT-LVL II: CPT | Mod: PBBFAC,,, | Performed by: ORTHOPAEDIC SURGERY

## 2018-03-20 PROCEDURE — 73610 X-RAY EXAM OF ANKLE: CPT | Mod: TC,LT

## 2018-03-20 PROCEDURE — 99214 OFFICE O/P EST MOD 30 MIN: CPT | Mod: S$GLB,,, | Performed by: ORTHOPAEDIC SURGERY

## 2018-03-20 PROCEDURE — 73610 X-RAY EXAM OF ANKLE: CPT | Mod: 26,LT,, | Performed by: RADIOLOGY

## 2018-03-21 ENCOUNTER — LAB VISIT (OUTPATIENT)
Dept: LAB | Facility: HOSPITAL | Age: 73
End: 2018-03-21
Attending: UROLOGY
Payer: MEDICARE

## 2018-03-21 DIAGNOSIS — C61 PROSTATE CANCER: ICD-10-CM

## 2018-03-21 PROCEDURE — 36415 COLL VENOUS BLD VENIPUNCTURE: CPT

## 2018-03-21 PROCEDURE — 84153 ASSAY OF PSA TOTAL: CPT

## 2018-03-22 ENCOUNTER — CLINICAL SUPPORT (OUTPATIENT)
Dept: REHABILITATION | Facility: HOSPITAL | Age: 73
End: 2018-03-22
Attending: PHYSICIAN ASSISTANT
Payer: MEDICARE

## 2018-03-22 DIAGNOSIS — R26.89 BALANCE PROBLEMS: ICD-10-CM

## 2018-03-22 DIAGNOSIS — S82.402F TYPE III OPEN FRACTURE OF LEFT TIBIA AND FIBULA WITH ROUTINE HEALING, SUBSEQUENT ENCOUNTER: Primary | ICD-10-CM

## 2018-03-22 DIAGNOSIS — Z74.09 IMPAIRED MOBILITY: ICD-10-CM

## 2018-03-22 DIAGNOSIS — R29.898 RIGHT LEG WEAKNESS: ICD-10-CM

## 2018-03-22 DIAGNOSIS — M25.673 DECREASED RANGE OF MOTION OF ANKLE: ICD-10-CM

## 2018-03-22 DIAGNOSIS — M25.60 RESTRICTION OF JOINT MOTION: ICD-10-CM

## 2018-03-22 DIAGNOSIS — M67.01 HEEL CORD TIGHTNESS, RIGHT: ICD-10-CM

## 2018-03-22 DIAGNOSIS — S82.202F TYPE III OPEN FRACTURE OF LEFT TIBIA AND FIBULA WITH ROUTINE HEALING, SUBSEQUENT ENCOUNTER: Primary | ICD-10-CM

## 2018-03-22 LAB — COMPLEXED PSA SERPL-MCNC: 12 NG/ML

## 2018-03-22 PROCEDURE — 97110 THERAPEUTIC EXERCISES: CPT | Mod: PN | Performed by: PHYSICAL THERAPIST

## 2018-03-22 PROCEDURE — G8978 MOBILITY CURRENT STATUS: HCPCS | Mod: CK,PN | Performed by: PHYSICAL THERAPIST

## 2018-03-22 PROCEDURE — G8979 MOBILITY GOAL STATUS: HCPCS | Mod: CK,PN | Performed by: PHYSICAL THERAPIST

## 2018-03-22 NOTE — PROGRESS NOTES
HPI:  Mr. 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  He is doing well and still has no pain.  His heel ulcer is essentially healed at this point and he is ambulating with no assistive devices.      ROS:  Patient denies constitutional symptoms, cardiac symptoms, respiratory symptoms, GI symptoms.  The remainder of the musculoskeletal ROS is included in the HPI.    PE:    AA&O x 4.  NAD  HEENT:  NCAT, sclera nonicteric  Lungs:  Respirations are equal and unlabored.  CV:  2+ bilateral upper and lower extremity pulses.    PE:  Incisions well healed.  Heel ulcer essentially healed with small hardened area remaining.  Incisions/wounds all well healed.  One small superficial area of dry scab mid tibia which comes and goes.  Very superficial with no signs of infection..       Rads:  Hwr intact.  Good alignment.  Fibula healed and tibia with bridging bone.    A/P:  11 months status post revision fixation with bone grafting of grade III open left distal tibia fracture.  He's healed a great deal with bridging bone in 3 of 4 cortices. He has mild defect medial just under the plate.  His alignment is good.  He is doing very well and I am happy to see him walk, finally.  He will continue strengthening exercises and I will see him back in 3-4 months with x-rays of the left ankle, sooner if he has any problems.

## 2018-03-22 NOTE — PROGRESS NOTES
"                                          Physical Therapy Daily Note           Name: Janusz Montgomery Jr.  Clinic Number: [id number]  Date of Treatment: 03/22/2018   Diagnosis:   Encounter Diagnoses   Name Primary?    Type III open fracture of left tibia and fibula with routine healing, subsequent encounter Yes    Impaired mobility     Balance problems     Right leg weakness     Restriction of joint motion     Heel cord tightness, right     Decreased range of motion of ankle        Time in: 1310  Time Out: 1420   Time: 70  VISITS / PURDY: 20/20 - 12/31/2017  NEW:    Total - 30  BOLD=INDICATES ACTIVITIES PERFORMED.      Subjective  Patient states that he is not having any pain in the foot / ankle walking without the boot. The movement has improved.   Pain level - 0/10 reported.     Objective    1/25/2018 -   Ankle   Left   Pain/Dysfunction with Movement     AROM PROM MMT     Plantarflexion 20  20 30  30 2-/5 Limited by weight bearing    Dorsiflexion  Knee extend.    Knee flexed        5  8  8  10    8  15  15  15 2+/5  Joint mobility and tissue extensibility limitation    Inversion 5  15 10  20 2+/5 Joint mobility and tissue extensibility limitation    Eversion 5  20 8  30 2+/5  Joint mobility and tissue extensibility limitation       Girth:                        L               1/25/2018     2" sup.med mall       10.75         10.25  Figure 8                    24              24  Mid foot                     11              11    Joint mobility assessment   Tibiotalar (talocrual) - soft tissue end point with improved quality but no increased motion.   Subtalar - abrupt end point with improved quality and no change in quantity of motion.   Talonavicular and Calcaneocuboid- some improved quality medially and slight change laterally   Treatment: Patient  was instructed in and performed therapeutic exercises to develop strength, ROM, flexibility, balance and joint mobility. Patient performed therapeutic exercises " "consisting of the following    Shuttle 10'   Leg press   Recumbent bike  Upright bike 8'  UE ergometer  Treadmill   Elliptical 1-2' - endurance compromised.   MATRIX - exten 10# x20  MATRIX - flex  10# x 20   MATRIX - abd - 15# x 20   MATRIX -add - 15# - x20   MATRIX - leg press 10# - not able     THERAPEUTIC EXERCISE 1-1 PT =  '70  SUPINE   -heel cord stretch 5'   -DF/PF x50 knee flexed at 30   -inver/ever x50   -circles x40 ea  -ABCs   -posterioir tib and peroneal stretch 30" x 4 ea. W/strap     SIDELYING  -eversion / inversion x30    PRONE    STANDING.  -gastroc stretch 2'  -soleus stretch 2'   -Toe stretch 2'  -heel raises x20  -SL stand on airex 15" x 8 at wall (forearm support)  -SL stand airex 30" VICKI   =SL stand at wall 15" x 8 on floor   -SL stand at wall leg swing 2x10 ea.   -tandem stand 15 " x8   -Squats x20  -Marching 2'  -rocker board 2'   -step overs fwd 2' ea. Leg .   -step ups 2'  -divers stance - 1 min.     SITTING  -toe curls      x20  -toes stretch 1' x 2   -prostretch 2'  -inversion   x20  -DF knee flexed / knee extended x20ea  -anterior tib stretch 30" x 4 with DF / PF x15   +soleus stretch 2'   -self stretch ( ant tib, peroneal, post tib ) 10" x 12 ea       MANUAL THERAPY: RT with I strip from the plantar foot over the achilles to the posterior leg.  Fan strips for edema over the anterior leg and dorsum of the foot.   MODALITY =   DIRECT EDUCATION:    Patient was issued HEP        Assessment    Mr. Montgomery is performing all activities. He does not demonstrate limitation from pain and is progressing favorably with balance and stability. Weakness is the challenge at this time in the LEs.   Medical Necessity is demonstrated by:   Unable/ limited ability  to participate in daily activities  Limited mobility, weight bearing status, diminished neuromuscular response and weakness limits function of effected part for some activities, Requires skilled supervision to complete and progress treatment " interventions and HEP.  Pt demo good understanding of the education provided. Patient demonstrated good return demonstration of activities.Patient's tolerance to treatment was good. Patient will continue to benefit from skilled PTintervention.Patient is making progress towards established goals.  New/Revised goals: no  Continue with established Plan of Care towards PT goals.   *CMS Impairment/Limitation/Restriction for FOTO Lower Leg (w/o Knee) Survey  Status Limitation G-Code CMS Severity Modifier  Intake 30% 70%  Predicted 42% 58% Goal Status+ CK - At least 40 percent but less than 60 percent  12/1/2017 41% 59%  1/23/2018 38% 62%  2/8/2018 43% 57%  3/22/2018 48% 52% Current Status CK - At least 40 percent but less than 60 percent  D/C Status CK **only report if this is discharge survey  +Based on FOTO predicted change score  [facility] [place]  FUNCTIONAL STATUS SUMMARY (11/14/2017)  Patient: JACKIE GERMAIN ([id number]) Primary Body Part: Lower Leg (w/o Knee) Initial DOS: 11/14/2017    PLAN     Certification Period: 2/6/2018to 5/6//2018  Recommended Treatment Plan: 2 times per week for 12 weeks: Manual Therapy, Neuromuscular Re-ed, Patient Education and Therapeutic Exercise  Other Recommendations: modalities PRN   Pt may be seen by PTA as part of the rehabilitation team.      Therapist: Yusuf Farooq, PT

## 2018-03-26 ENCOUNTER — CLINICAL SUPPORT (OUTPATIENT)
Dept: REHABILITATION | Facility: HOSPITAL | Age: 73
End: 2018-03-26
Attending: PHYSICIAN ASSISTANT
Payer: MEDICARE

## 2018-03-26 ENCOUNTER — CLINICAL SUPPORT (OUTPATIENT)
Dept: ELECTROPHYSIOLOGY | Facility: CLINIC | Age: 73
End: 2018-03-26
Attending: INTERNAL MEDICINE
Payer: MEDICARE

## 2018-03-26 DIAGNOSIS — M67.01 HEEL CORD TIGHTNESS, RIGHT: ICD-10-CM

## 2018-03-26 DIAGNOSIS — M25.673 DECREASED RANGE OF MOTION OF ANKLE: ICD-10-CM

## 2018-03-26 DIAGNOSIS — S82.202F TYPE III OPEN FRACTURE OF LEFT TIBIA AND FIBULA WITH ROUTINE HEALING, SUBSEQUENT ENCOUNTER: ICD-10-CM

## 2018-03-26 DIAGNOSIS — M25.60 RESTRICTION OF JOINT MOTION: ICD-10-CM

## 2018-03-26 DIAGNOSIS — Z95.818 STATUS POST PLACEMENT OF IMPLANTABLE LOOP RECORDER: ICD-10-CM

## 2018-03-26 DIAGNOSIS — I48.0 PAF (PAROXYSMAL ATRIAL FIBRILLATION): ICD-10-CM

## 2018-03-26 DIAGNOSIS — R26.89 BALANCE PROBLEMS: ICD-10-CM

## 2018-03-26 DIAGNOSIS — Z74.09 IMPAIRED MOBILITY: Primary | ICD-10-CM

## 2018-03-26 DIAGNOSIS — R29.898 RIGHT LEG WEAKNESS: ICD-10-CM

## 2018-03-26 DIAGNOSIS — S82.402F TYPE III OPEN FRACTURE OF LEFT TIBIA AND FIBULA WITH ROUTINE HEALING, SUBSEQUENT ENCOUNTER: ICD-10-CM

## 2018-03-26 PROCEDURE — 97110 THERAPEUTIC EXERCISES: CPT | Mod: PN | Performed by: PHYSICAL THERAPIST

## 2018-03-26 PROCEDURE — 93298 REM INTERROG DEV EVAL SCRMS: CPT | Mod: S$GLB,,, | Performed by: INTERNAL MEDICINE

## 2018-03-26 PROCEDURE — 93299 LOOP RECORDER REMOTE: CPT | Mod: S$GLB,,, | Performed by: INTERNAL MEDICINE

## 2018-03-26 NOTE — PROGRESS NOTES
"                                          Physical Therapy Daily Note           Name: Janusz Montgomery Jr.  Clinic Number: [id number]  Date of Treatment: 03/26/2018   Diagnosis:   Encounter Diagnoses   Name Primary?    Impaired mobility Yes    Balance problems     Type III open fracture of left tibia and fibula with routine healing, subsequent encounter     Right leg weakness     Restriction of joint motion     Heel cord tightness, right     Decreased range of motion of ankle        Time in: 1505  Time Out: 1610   Time: 65  VISITS / PURDY: 20/20 - 12/31/2017  NEW:  19/20  Total - 30  BOLD=INDICATES ACTIVITIES PERFORMED.      Subjective  Patient states that he is not having any pain in the foot / ankle walking without the boot. The movement has improved.   Pain level - 0/10 reported.     Objective    1/25/2018 -   Ankle   Left   Pain/Dysfunction with Movement     AROM PROM MMT     Plantarflexion 20  20 30  30 2-/5 Limited by weight bearing    Dorsiflexion  Knee extend.    Knee flexed        5  8  8  10    8  15  15  15 2+/5  Joint mobility and tissue extensibility limitation    Inversion 5  15 10  20 2+/5 Joint mobility and tissue extensibility limitation    Eversion 5  20 8  30 2+/5  Joint mobility and tissue extensibility limitation       Girth:                        L               1/25/2018     2" sup.med mall       10.75         10.25  Figure 8                    24              24  Mid foot                     11              11    Joint mobility assessment   Tibiotalar (talocrual) - soft tissue end point with improved quality but no increased motion.   Subtalar - abrupt end point with improved quality and no change in quantity of motion.   Talonavicular and Calcaneocuboid- some improved quality medially and slight change laterally   Treatment: Patient  was instructed in and performed therapeutic exercises to develop strength, ROM, flexibility, balance and joint mobility. Patient performed therapeutic " "exercises consisting of the following    Shuttle 10'   Leg press   Recumbent bike  Upright bike 8'  UE ergometer  Treadmill   Elliptical 1-2' - endurance compromised.   MATRIX - exten 10# x20  MATRIX - flex  10# x 20   MATRIX - abd - 15# x 20   MATRIX -add - 15# - x20   MATRIX - leg press 10# - not able     THERAPEUTIC EXERCISE 1-1 PT =  65  SUPINE   -heel cord stretch 5'   -DF/PF x50 knee flexed at 30   -inver/ever x50   -circles x40 ea  -ABCs   -posterioir tib and peroneal stretch 30" x 4 ea. W/strap     SIDELYING  -eversion / inversion x30    PRONE    STANDING.  -gastroc stretch 2'  -soleus stretch 2'   -Toe stretch 2'  -heel raises x20  -SL stand on airex 15" x 8 at wall (forearm support)  -SL stand airex 30" VICKI   =SL stand at wall 15" x 8 on floor   -SL stand at wall leg swing 2x10 ea. ball  -tandem stand 15 " x8   -Squats x20  -Marching 2' trampoline  -rocker board 2'   -step overs fwd 2' ea. Leg .   -step ups 2'  -divers stance - 1 min.     SITTING  -toe curls      x20  -toes stretch 1' x 2   -prostretch 2'  -inversion   x20  -DF knee flexed / knee extended x20ea  -anterior tib stretch 30" x 4 with DF / PF x15   +soleus stretch 2'   -self stretch ( ant tib, peroneal, post tib ) 10" x 12 ea       MANUAL THERAPY: MODALITY =   DIRECT EDUCATION:    Patient was issued HEP        Assessment    The patient completed all activities noted. He has some challenges with SL standing on an unstable surface however he can manage with some support. He has demonstrated the ability to ambulate safely without support or without the boot. Will look to DC the patient at the next visit.   Medical Necessity is demonstrated by:   Unable/ limited ability  to participate in daily activities  Limited mobility, weight bearing status, diminished neuromuscular response and weakness limits function of effected part for some activities, Requires skilled supervision to complete and progress treatment interventions and HEP.  Pt demo good " understanding of the education provided. Patient demonstrated good return demonstration of activities.Patient's tolerance to treatment was good. Patient will continue to benefit from skilled PTintervention.Patient is making progress towards established goals.  New/Revised goals: no  Continue with established Plan of Care towards PT goals.   *CMS Impairment/Limitation/Restriction for FOTO Lower Leg (w/o Knee) Survey  Status Limitation G-Code CMS Severity Modifier  Intake 30% 70%  Predicted 42% 58% Goal Status+ CK - At least 40 percent but less than 60 percent  12/1/2017 41% 59%  1/23/2018 38% 62%  2/8/2018 43% 57%  3/22/2018 48% 52% Current Status CK - At least 40 percent but less than 60 percent  D/C Status CK **only report if this is discharge survey  +Based on FOTO predicted change score  [facility] - [facility] [place]  FUNCTIONAL STATUS SUMMARY (11/14/2017)  Patient: JACKIE GERMAIN ([id number]) Primary Body Part: Lower Leg (w/o Knee) Initial DOS: 11/14/2017    PLAN     Certification Period: 2/6/2018to 5/6//2018  Recommended Treatment Plan: 2 times per week for 12 weeks: Manual Therapy, Neuromuscular Re-ed, Patient Education and Therapeutic Exercise  Other Recommendations: modalities PRN   Pt may be seen by PTA as part of the rehabilitation team.      Therapist: Yusuf Farooq, PT

## 2018-03-27 ENCOUNTER — OFFICE VISIT (OUTPATIENT)
Dept: UROLOGY | Facility: CLINIC | Age: 73
End: 2018-03-27
Payer: MEDICARE

## 2018-03-27 VITALS — HEIGHT: 73 IN | WEIGHT: 315 LBS | BODY MASS INDEX: 41.75 KG/M2

## 2018-03-27 DIAGNOSIS — N52.9 ED (ERECTILE DYSFUNCTION) OF ORGANIC ORIGIN: ICD-10-CM

## 2018-03-27 DIAGNOSIS — R33.9 INCOMPLETE EMPTYING OF BLADDER: ICD-10-CM

## 2018-03-27 DIAGNOSIS — R35.1 NOCTURIA MORE THAN TWICE PER NIGHT: ICD-10-CM

## 2018-03-27 DIAGNOSIS — R35.0 URINARY FREQUENCY: ICD-10-CM

## 2018-03-27 DIAGNOSIS — C61 PROSTATE CANCER: Primary | ICD-10-CM

## 2018-03-27 DIAGNOSIS — I10 ESSENTIAL HYPERTENSION: ICD-10-CM

## 2018-03-27 PROCEDURE — 99499 UNLISTED E&M SERVICE: CPT | Mod: S$GLB,,, | Performed by: UROLOGY

## 2018-03-27 PROCEDURE — 99999 PR PBB SHADOW E&M-EST. PATIENT-LVL III: CPT | Mod: PBBFAC,,, | Performed by: UROLOGY

## 2018-03-27 PROCEDURE — 99214 OFFICE O/P EST MOD 30 MIN: CPT | Mod: S$GLB,,, | Performed by: UROLOGY

## 2018-03-27 RX ORDER — SILDENAFIL 100 MG/1
100 TABLET, FILM COATED ORAL DAILY PRN
Qty: 10 TABLET | Refills: 11 | Status: SHIPPED | OUTPATIENT
Start: 2018-03-27 | End: 2019-10-23 | Stop reason: SDUPTHER

## 2018-03-27 RX ORDER — TAMSULOSIN HYDROCHLORIDE 0.4 MG/1
0.4 CAPSULE ORAL DAILY
Qty: 90 CAPSULE | Refills: 0 | Status: SHIPPED | OUTPATIENT
Start: 2018-03-27 | End: 2018-04-16 | Stop reason: SDUPTHER

## 2018-03-27 RX ORDER — RAMIPRIL 5 MG/1
5 CAPSULE ORAL DAILY
Qty: 90 CAPSULE | Refills: 1 | Status: SHIPPED | OUTPATIENT
Start: 2018-03-27 | End: 2018-11-01 | Stop reason: SDUPTHER
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
## 2018-03-28 ENCOUNTER — OFFICE VISIT (OUTPATIENT)
Dept: OPHTHALMOLOGY | Facility: CLINIC | Age: 73
End: 2018-03-28
Payer: MEDICARE

## 2018-03-28 ENCOUNTER — TELEPHONE (OUTPATIENT)
Dept: RADIATION ONCOLOGY | Facility: CLINIC | Age: 73
End: 2018-03-28

## 2018-03-28 DIAGNOSIS — B00.52 HERPES SIMPLEX VIRUS STROMAL KERATITIS: Primary | ICD-10-CM

## 2018-03-28 PROCEDURE — 99999 PR PBB SHADOW E&M-EST. PATIENT-LVL II: CPT | Mod: PBBFAC,,, | Performed by: OPHTHALMOLOGY

## 2018-03-28 PROCEDURE — 92014 COMPRE OPH EXAM EST PT 1/>: CPT | Mod: S$GLB,,, | Performed by: OPHTHALMOLOGY

## 2018-03-28 RX ORDER — ACYCLOVIR 800 MG/1
800 TABLET ORAL 2 TIMES DAILY
Qty: 180 TABLET | Refills: 6 | Status: SHIPPED | OUTPATIENT
Start: 2018-03-28 | End: 2019-05-13 | Stop reason: SDUPTHER

## 2018-03-28 NOTE — PROGRESS NOTES
HPI     Eye Problem    Additional comments: Right Eye.            Comments   Pt presents for 1 month HSV Keratitis Check   Pt states OD is back to normal.   Tapered off of drops.  Last instilled   Sunday.   Still using Acyclovir 800 mg bid, will need new Rx if he is to continue.          Last edited by Narda Salas on 3/28/2018  1:26 PM. (History)            Assessment /Plan     For exam results, see Encounter Report.    Herpes simplex virus stromal keratitis      Quiet now, last flare in 2.2018  Tapered off PF  Cont Acyc 800bid

## 2018-03-28 NOTE — TELEPHONE ENCOUNTER
----- Message from Alicia Jackson sent at 3/28/2018 10:11 AM CDT -----  Contact: Pt's wife  Pt saw Dr Minor yesterday and was told to call us to discuss scheduling treatment. Please call at [phone].  Return call to patient  appt made and confirmed

## 2018-04-13 ENCOUNTER — OFFICE VISIT (OUTPATIENT)
Dept: CARDIOLOGY | Facility: CLINIC | Age: 73
End: 2018-04-13
Payer: MEDICARE

## 2018-04-13 VITALS
SYSTOLIC BLOOD PRESSURE: 138 MMHG | BODY MASS INDEX: 41.75 KG/M2 | DIASTOLIC BLOOD PRESSURE: 76 MMHG | HEART RATE: 94 BPM | WEIGHT: 315 LBS | HEIGHT: 73 IN | OXYGEN SATURATION: 98 %

## 2018-04-13 DIAGNOSIS — I25.10 CORONARY ARTERY DISEASE INVOLVING NATIVE CORONARY ARTERY OF NATIVE HEART WITHOUT ANGINA PECTORIS: ICD-10-CM

## 2018-04-13 DIAGNOSIS — E66.01 MORBID OBESITY WITH BMI OF 40.0-44.9, ADULT: ICD-10-CM

## 2018-04-13 DIAGNOSIS — E78.5 HYPERLIPIDEMIA, UNSPECIFIED HYPERLIPIDEMIA TYPE: ICD-10-CM

## 2018-04-13 DIAGNOSIS — I10 ESSENTIAL HYPERTENSION: ICD-10-CM

## 2018-04-13 DIAGNOSIS — I73.9 PVD (PERIPHERAL VASCULAR DISEASE): ICD-10-CM

## 2018-04-13 DIAGNOSIS — G47.33 OBSTRUCTIVE SLEEP APNEA ON CPAP: ICD-10-CM

## 2018-04-13 DIAGNOSIS — I48.0 PAF (PAROXYSMAL ATRIAL FIBRILLATION): Primary | ICD-10-CM

## 2018-04-13 PROCEDURE — 3075F SYST BP GE 130 - 139MM HG: CPT | Mod: CPTII,S$GLB,, | Performed by: INTERNAL MEDICINE

## 2018-04-13 PROCEDURE — 99214 OFFICE O/P EST MOD 30 MIN: CPT | Mod: S$GLB,,, | Performed by: INTERNAL MEDICINE

## 2018-04-13 PROCEDURE — 3078F DIAST BP <80 MM HG: CPT | Mod: CPTII,S$GLB,, | Performed by: INTERNAL MEDICINE

## 2018-04-13 PROCEDURE — 99999 PR PBB SHADOW E&M-EST. PATIENT-LVL III: CPT | Mod: PBBFAC,,, | Performed by: INTERNAL MEDICINE

## 2018-04-13 PROCEDURE — 99499 UNLISTED E&M SERVICE: CPT | Mod: S$PBB,,, | Performed by: INTERNAL MEDICINE

## 2018-04-13 NOTE — PROGRESS NOTES
Subjective:    Patient ID:  Janusz Montgomery Jr. is a 73 y.o. male who presents for evaluation of Follow-up      HPI   previous history:  Here for follow-up of CAD and paroxysmal atrial fibrillation.  He mainly complains of shortness of breath and swelling with fluid accumulation.  He does try and treat himself and takes extra Lasix which dehydrates him.  He's had admission for this before with acute renal failure.  We discussed that he needs to prevent the problem by not taking in sodium.  He does have sleep apnea follow-up.  He denies any current PND, orthopnea and has stable abdominal swelling.  He denies any dizziness, presyncope or syncope.  He mainly is limited by recent leg fracture.    Today:  Here follow-up of CAD and paroxysmal atrial fibrillation.  He did follow-up with EP at [facility] and his been started on blood thinners.  He still has mainly issues with shortness of breath and little bit of swelling from time to time.  He takes Lasix as needed.  He denies any PND or orthopnea.  He did follow-up is now Orthodoxy with CPAP.  He's not expressing dizziness, presyncope or syncope.  We discussed again sodium intake which is a fan of the Chinese buffet.  We also discussed exercise as tolerated.    Review of Systems   Constitution: Negative.   HENT: Negative.    Eyes: Negative.    Cardiovascular: Positive for dyspnea on exertion. Negative for chest pain, irregular heartbeat, leg swelling, near-syncope, orthopnea, palpitations, paroxysmal nocturnal dyspnea and syncope.   Respiratory: Positive for shortness of breath.    Skin: Negative.    Musculoskeletal: Negative.    Gastrointestinal: Negative for abdominal pain, constipation and diarrhea.   Genitourinary: Negative for dysuria.   Neurological: Negative for dizziness.   Psychiatric/Behavioral: Negative.         Objective:    Physical Exam   Constitutional: He is oriented to person, place, and time. He appears well-developed and well-nourished. No distress.    HENT:   Head: Normocephalic and atraumatic.   Eyes: Conjunctivae and EOM are normal. Pupils are equal, round, and reactive to light.   Neck: Normal range of motion. Neck supple. No thyromegaly present.   Cardiovascular: Normal rate, regular rhythm and normal heart sounds.    No murmur heard.  Pulmonary/Chest: Effort normal and breath sounds normal. No respiratory distress. He has no wheezes. He has no rales. He exhibits no tenderness.   Abdominal: Soft. Bowel sounds are normal.   Musculoskeletal: He exhibits no edema.   Neurological: He is alert and oriented to person, place, and time.   Skin: Skin is warm and dry.   Psychiatric: He has a normal mood and affect. His behavior is normal.         ekg normal sinus rhythm    Lower extremity arterial ultrasound: 2016  No appreciable focal stenosis; however, ankle-brachial index on the left is abnormal.    Echo: 2-17  CONCLUSIONS     1 - Normal left ventricular systolic function (EF 55-60%).     2 - Normal left ventricular diastolic function.     3 - Normal right ventricular systolic function .     NST:  Impression: ABNORMAL MYOCARDIAL PERFUSION  1. There is evidence for mild myocardial ischemia in the anterior wall of the left ventricle. Specificity is reduced secondary to body habitus.   2. The perfusion scan is free of evidence for myocardial injury.   3. There is a moderate intensity fixed defect in the inferior wall of the left ventricle, secondary to diaphragm attenuation.   4. Resting wall motion is physiologic.   5. Visually estimated LV function is normal.   6. The ventricular volumes are normal at rest and stress.   7. The extracardiac distribution of radioactivity is normal.   8. When compared to the previous study from 01/14/2009, possible anterior ischemia (vs attenuation artifact) and fixed inferior defect (suggestive of attenuation artifact) now present.  Consider PET-stress for further evaluation if clinically indicated.    LDL-84  11-17    Assessment:        1. PAF (paroxysmal atrial fibrillation)    2. Essential hypertension    3. PVD (peripheral vascular disease)    4. Coronary artery disease involving native coronary artery of native heart without angina pectoris s/p RCA stent    5. Morbid obesity with BMI of 40.0-44.9, adult    6. Obstructive sleep apnea on CPAP    7. Hyperlipidemia, unspecified hyperlipidemia type         Plan:       -Continue med therapy  -Emphasized sodium restriction  -Stable normal sinus rhythm today and on loop recorder interrogation  -Now on Eliquis for OAC  -Follow-up with rehabilitation and wound care  -PAD (carotid) screen  -Check labs including chemistry and BNP, currently taking Lasix as needed     Return to clinic in 6 months

## 2018-04-16 ENCOUNTER — OFFICE VISIT (OUTPATIENT)
Dept: RADIATION ONCOLOGY | Facility: CLINIC | Age: 73
End: 2018-04-16
Payer: MEDICARE

## 2018-04-16 VITALS
HEIGHT: 73 IN | HEART RATE: 75 BPM | SYSTOLIC BLOOD PRESSURE: 145 MMHG | RESPIRATION RATE: 20 BRPM | DIASTOLIC BLOOD PRESSURE: 65 MMHG | WEIGHT: 315 LBS | BODY MASS INDEX: 41.75 KG/M2

## 2018-04-16 DIAGNOSIS — C61 PROSTATE CANCER: Primary | ICD-10-CM

## 2018-04-16 DIAGNOSIS — D49.59 NEOPLASM OF PROSTATE: ICD-10-CM

## 2018-04-16 PROCEDURE — 99999 PR PBB SHADOW E&M-EST. PATIENT-LVL III: CPT | Mod: PBBFAC,,, | Performed by: RADIOLOGY

## 2018-04-16 PROCEDURE — 3077F SYST BP >= 140 MM HG: CPT | Mod: CPTII,S$GLB,, | Performed by: RADIOLOGY

## 2018-04-16 PROCEDURE — 99212 OFFICE O/P EST SF 10 MIN: CPT | Mod: S$GLB,,, | Performed by: RADIOLOGY

## 2018-04-16 PROCEDURE — 3078F DIAST BP <80 MM HG: CPT | Mod: CPTII,S$GLB,, | Performed by: RADIOLOGY

## 2018-04-16 PROCEDURE — 99499 UNLISTED E&M SERVICE: CPT | Mod: S$PBB,,, | Performed by: RADIOLOGY
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
## 2018-04-30 ENCOUNTER — CLINICAL SUPPORT (OUTPATIENT)
Dept: ELECTROPHYSIOLOGY | Facility: CLINIC | Age: 73
End: 2018-04-30
Attending: INTERNAL MEDICINE
Payer: MEDICARE

## 2018-04-30 DIAGNOSIS — Z95.818 STATUS POST PLACEMENT OF IMPLANTABLE LOOP RECORDER: ICD-10-CM

## 2018-04-30 DIAGNOSIS — I48.0 PAF (PAROXYSMAL ATRIAL FIBRILLATION): ICD-10-CM

## 2018-04-30 PROCEDURE — 93298 REM INTERROG DEV EVAL SCRMS: CPT | Mod: S$GLB,,, | Performed by: INTERNAL MEDICINE

## 2018-04-30 PROCEDURE — 93299 LOOP RECORDER REMOTE: CPT | Mod: S$GLB,,, | Performed by: INTERNAL MEDICINE

## 2018-05-03 ENCOUNTER — HOSPITAL ENCOUNTER (OUTPATIENT)
Dept: CARDIOLOGY | Facility: HOSPITAL | Age: 73
Discharge: HOME | End: 2018-05-03
Attending: INTERNAL MEDICINE
Payer: MEDICARE

## 2018-05-03 DIAGNOSIS — I73.9 PVD (PERIPHERAL VASCULAR DISEASE): ICD-10-CM

## 2018-05-03 DIAGNOSIS — I25.10 CORONARY ARTERY DISEASE INVOLVING NATIVE CORONARY ARTERY OF NATIVE HEART WITHOUT ANGINA PECTORIS: ICD-10-CM

## 2018-05-03 LAB — INTERNAL CAROTID STENOSIS: NORMAL

## 2018-05-03 PROCEDURE — 93880 EXTRACRANIAL BILAT STUDY: CPT

## 2018-05-03 PROCEDURE — 93880 EXTRACRANIAL BILAT STUDY: CPT | Mod: 26,,, | Performed by: INTERNAL MEDICINE

## 2018-05-08 ENCOUNTER — HOSPITAL ENCOUNTER (OUTPATIENT)
Dept: RADIOLOGY | Facility: HOSPITAL | Age: 73
Discharge: HOME | End: 2018-05-08
Attending: RADIOLOGY
Payer: MEDICARE

## 2018-05-08 DIAGNOSIS — D49.59 NEOPLASM OF PROSTATE: ICD-10-CM

## 2018-05-08 PROCEDURE — A9585 GADOBUTROL INJECTION: HCPCS | Performed by: RADIOLOGY

## 2018-05-08 PROCEDURE — 72197 MRI PELVIS W/O & W/DYE: CPT | Mod: TC

## 2018-05-08 PROCEDURE — 72197 MRI PELVIS W/O & W/DYE: CPT | Mod: 26,,, | Performed by: RADIOLOGY

## 2018-05-08 PROCEDURE — 25500020 PHARM REV CODE 255: Performed by: RADIOLOGY

## 2018-05-08 RX ORDER — GADOBUTROL 604.72 MG/ML
10 INJECTION INTRAVENOUS
Status: COMPLETED | OUTPATIENT
Start: 2018-05-08 | End: 2018-05-08

## 2018-05-08 RX ADMIN — GADOBUTROL 10 ML: 604.72 INJECTION INTRAVENOUS at 04:05

## 2018-05-10 RX ORDER — ATORVASTATIN CALCIUM 40 MG/1
40 TABLET, FILM COATED ORAL DAILY
Qty: 90 TABLET | Refills: 0 | Status: SHIPPED | OUTPATIENT
Start: 2018-05-10 | End: 2018-08-09 | Stop reason: SDUPTHER

## 2018-05-16 ENCOUNTER — PATIENT OUTREACH (OUTPATIENT)
Dept: ADMINISTRATIVE | Facility: HOSPITAL | Age: 73
End: 2018-05-16

## 2018-05-17 ENCOUNTER — RESEARCH ENCOUNTER (OUTPATIENT)
Dept: RESEARCH | Facility: HOSPITAL | Age: 73
End: 2018-05-17

## 2018-05-17 ENCOUNTER — PATIENT MESSAGE (OUTPATIENT)
Dept: ELECTROPHYSIOLOGY | Facility: CLINIC | Age: 73
End: 2018-05-17

## 2018-05-17 NOTE — PROGRESS NOTES
Jonofi ARIEL. [employer] Vaccine Trial [id number]  IRB# 2013.143.C/ PI: Maco   Subject #[id number]    TERMINATION/STUDY COMPLETION     , Mojgan Gold, contacted patient on 14May2018 to inform of official trial completion. Patient sent [employer] Volunteer letter regarding closure of trial in mail prior to phone conversation, however,  did explain details related to trial ending prematurely.     -Subject confirms NO KVNG's have occurred since last contact.      -Subject confirms current medications are still ongoing.     -Subject confirms NO other outstanding issues or questions related to adverse events or KVNG's.     Site encouraged subject to now discard of stool kit if he/she wishes. Patient also reminded the final and last payment for completed TC's have been uploaded to [employer] at beginning of this year.  Subject encouraged to contact site if any problems occur with clincard and using money on card.     All questions answered.  thanked subject for participation and encouraged to call if he/she has any questions in the future regarding the clinical trial.          SYSTEMS TO BE COMPLETED:    INFORM:  ONCORE:  EPIC NOTE:  REMOVE CARE TEAM:  LOG:  ACCELOVANCE:   **If Termination due to death, please be sure to report to IRB:

## 2018-05-23 ENCOUNTER — OFFICE VISIT (OUTPATIENT)
Dept: RADIATION ONCOLOGY | Facility: CLINIC | Age: 73
End: 2018-05-23
Attending: RADIOLOGY
Payer: MEDICARE

## 2018-05-23 VITALS
DIASTOLIC BLOOD PRESSURE: 61 MMHG | HEIGHT: 73 IN | SYSTOLIC BLOOD PRESSURE: 139 MMHG | HEART RATE: 83 BPM | RESPIRATION RATE: 18 BRPM | BODY MASS INDEX: 41.75 KG/M2 | WEIGHT: 315 LBS

## 2018-05-23 DIAGNOSIS — C61 PROSTATE CANCER: Primary | ICD-10-CM

## 2018-05-23 PROCEDURE — 99212 OFFICE O/P EST SF 10 MIN: CPT | Mod: S$GLB,,, | Performed by: RADIOLOGY

## 2018-05-23 PROCEDURE — 99499 UNLISTED E&M SERVICE: CPT | Mod: S$PBB,,, | Performed by: RADIOLOGY

## 2018-05-23 PROCEDURE — 3075F SYST BP GE 130 - 139MM HG: CPT | Mod: CPTII,S$GLB,, | Performed by: RADIOLOGY

## 2018-05-23 PROCEDURE — 99999 PR PBB SHADOW E&M-EST. PATIENT-LVL III: CPT | Mod: PBBFAC,,, | Performed by: RADIOLOGY

## 2018-05-23 PROCEDURE — 3078F DIAST BP <80 MM HG: CPT | Mod: CPTII,S$GLB,, | Performed by: RADIOLOGY
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
## 2018-05-23 NOTE — Clinical Note
arrange for mapping and implantation with dr peralta.  The patient will require medical clearance.

## 2018-05-31 ENCOUNTER — PATIENT MESSAGE (OUTPATIENT)
Dept: RADIATION ONCOLOGY | Facility: CLINIC | Age: 73
End: 2018-05-31

## 2018-05-31 ENCOUNTER — OFFICE VISIT (OUTPATIENT)
Dept: CARDIOLOGY | Facility: CLINIC | Age: 73
End: 2018-05-31
Payer: MEDICARE

## 2018-05-31 VITALS
SYSTOLIC BLOOD PRESSURE: 138 MMHG | OXYGEN SATURATION: 95 % | HEART RATE: 81 BPM | BODY MASS INDEX: 46.83 KG/M2 | RESPIRATION RATE: 20 BRPM | DIASTOLIC BLOOD PRESSURE: 68 MMHG | WEIGHT: 315 LBS

## 2018-05-31 DIAGNOSIS — E66.01 MORBID OBESITY WITH BMI OF 40.0-44.9, ADULT: ICD-10-CM

## 2018-05-31 DIAGNOSIS — E78.5 HYPERLIPIDEMIA, UNSPECIFIED HYPERLIPIDEMIA TYPE: ICD-10-CM

## 2018-05-31 DIAGNOSIS — Z95.818 STATUS POST PLACEMENT OF IMPLANTABLE LOOP RECORDER: ICD-10-CM

## 2018-05-31 DIAGNOSIS — G47.33 OBSTRUCTIVE SLEEP APNEA ON CPAP: ICD-10-CM

## 2018-05-31 DIAGNOSIS — I25.10 CORONARY ARTERY DISEASE INVOLVING NATIVE CORONARY ARTERY OF NATIVE HEART WITHOUT ANGINA PECTORIS: Primary | ICD-10-CM

## 2018-05-31 DIAGNOSIS — I73.9 PVD (PERIPHERAL VASCULAR DISEASE): ICD-10-CM

## 2018-05-31 DIAGNOSIS — I10 ESSENTIAL HYPERTENSION: ICD-10-CM

## 2018-05-31 DIAGNOSIS — I48.0 PAF (PAROXYSMAL ATRIAL FIBRILLATION): ICD-10-CM

## 2018-05-31 PROCEDURE — 99499 UNLISTED E&M SERVICE: CPT | Mod: S$PBB,,, | Performed by: INTERNAL MEDICINE

## 2018-05-31 PROCEDURE — 99999 PR PBB SHADOW E&M-EST. PATIENT-LVL III: CPT | Mod: PBBFAC,,, | Performed by: INTERNAL MEDICINE

## 2018-05-31 PROCEDURE — 99214 OFFICE O/P EST MOD 30 MIN: CPT | Mod: S$GLB,,, | Performed by: INTERNAL MEDICINE

## 2018-05-31 PROCEDURE — 3078F DIAST BP <80 MM HG: CPT | Mod: CPTII,S$GLB,, | Performed by: INTERNAL MEDICINE

## 2018-05-31 PROCEDURE — 3075F SYST BP GE 130 - 139MM HG: CPT | Mod: CPTII,S$GLB,, | Performed by: INTERNAL MEDICINE

## 2018-05-31 NOTE — PROGRESS NOTES
Subjective:    Patient ID:  Janusz Montgomery Jr. is a 73 y.o. male who presents for evaluation of Follow-up (clearance )      HPI   previous history:  Here follow-up of CAD and paroxysmal atrial fibrillation.  He did follow-up with EP at [facility] and his been started on blood thinners.  He still has mainly issues with shortness of breath and little bit of swelling from time to time.  He takes Lasix as needed.  He denies any PND or orthopnea.  He did follow-up is now Yarsanism with CPAP.  He's not expressing dizziness, presyncope or syncope.  We discussed again sodium intake which is a fan of the Chinese buffet.  We also discussed exercise as tolerated.    Today:  Here for follow-up of CAD and paroxysmal atrial fibrillation.  Had no worsening cardiopulmonary complaints.  He says slowly able to go up a flight of stairs without any issues.  He denies any chest pain but does get shortness of breath on heavier activity.  He's had no sustained tachycardia or palpitations.  Recent loop recorder transmission showed only sinus rhythm.  She denies any PND, orthopnea or lower edema.  He's not expressing dizziness, presyncope or syncope.  He does need cardiac clearance for prostate seeding with diagnosed cancer.    Review of Systems   Constitution: Negative.   HENT: Negative.    Eyes: Negative.    Cardiovascular: Positive for dyspnea on exertion. Negative for chest pain, irregular heartbeat, leg swelling, near-syncope, orthopnea, palpitations, paroxysmal nocturnal dyspnea and syncope.   Respiratory: Positive for shortness of breath.    Skin: Negative.    Musculoskeletal: Negative.    Gastrointestinal: Negative for abdominal pain, constipation and diarrhea.   Genitourinary: Negative for dysuria.   Neurological: Negative for dizziness.   Psychiatric/Behavioral: Negative.         Objective:    Physical Exam   Constitutional: He is oriented to person, place, and time. He appears well-developed and well-nourished. No distress.    HENT:   Head: Normocephalic and atraumatic.   Eyes: Conjunctivae and EOM are normal. Pupils are equal, round, and reactive to light.   Neck: Normal range of motion. Neck supple. No thyromegaly present.   Cardiovascular: Normal rate, regular rhythm and normal heart sounds.    No murmur heard.  Pulmonary/Chest: Effort normal and breath sounds normal. No respiratory distress. He has no wheezes. He has no rales. He exhibits no tenderness.   Abdominal: Soft. Bowel sounds are normal.   Musculoskeletal: He exhibits no edema.   Neurological: He is alert and oriented to person, place, and time.   Skin: Skin is warm and dry.   Psychiatric: He has a normal mood and affect. His behavior is normal.             Lower extremity arterial ultrasound: 2016  No appreciable focal stenosis; however, ankle-brachial index on the left is abnormal.    Echo: 2-17  CONCLUSIONS     1 - Normal left ventricular systolic function (EF 55-60%).     2 - Normal left ventricular diastolic function.     3 - Normal right ventricular systolic function .     NST:  12-17  Impression: ABNORMAL MYOCARDIAL PERFUSION  1. There is evidence for mild myocardial ischemia in the anterior wall of the left ventricle. Specificity is reduced secondary to body habitus.   2. The perfusion scan is free of evidence for myocardial injury.   3. There is a moderate intensity fixed defect in the inferior wall of the left ventricle, secondary to diaphragm attenuation.   4. Resting wall motion is physiologic.   5. Visually estimated LV function is normal.   6. The ventricular volumes are normal at rest and stress.   7. The extracardiac distribution of radioactivity is normal.   8. When compared to the previous study from 01/14/2009, possible anterior ischemia (vs attenuation artifact) and fixed inferior defect (suggestive of attenuation artifact) now present.  Consider PET-stress for further evaluation if clinically indicated.    LDL-84  11-17    Carotid ultrasound:   5-18  CONCLUSIONS   There is 20 - 39% right Internal Carotid stenosis.  There is 20 - 39% left Internal Carotid stenosis.  Assessment:       1. Coronary artery disease involving native coronary artery of native heart without angina pectoris s/p RCA stent    2. Status post placement of implantable loop recorder    3. PAF (paroxysmal atrial fibrillation)    4. PVD (peripheral vascular disease)    5. Essential hypertension    6. Morbid obesity with BMI of 40.0-44.9, adult    7. Obstructive sleep apnea on CPAP    8. Hyperlipidemia, unspecified hyperlipidemia type         Plan:       -Continue med therapy  -Emphasized sodium restriction  -Stable normal sinus rhythm on loop recorder interrogation  -Now on Eliquis for OAC  -Follow-up with rehabilitation and wound care  -Cleared at low to intermediate risk and okay to hold aspirin ×5 days and Eliquis ×2 days prior to the procedure    Return to clinic as scheduled

## 2018-05-31 NOTE — LETTER
May 31, 2018    Janusz Montgomery Jr.  [address]  [place] [id number]             [facility] [place] [facility] Cardiology  [address]  [place] [place] [address]  Phone: [phone]   Mr. Janusz Montgomery Jr. was seen by me on 5/31/2018 and is cleared for surgery  with low to intermediate risk.  Ok to hold Eliquis for 2 days prior.  Resume when cleared by surgeon.     If you have any questions or concerns, please don't hesitate to call.    Sincerely,      Miguel Smith MD

## 2018-06-04 ENCOUNTER — TELEPHONE (OUTPATIENT)
Dept: UROLOGY | Facility: CLINIC | Age: 73
End: 2018-06-04

## 2018-06-04 ENCOUNTER — TELEPHONE (OUTPATIENT)
Dept: RADIATION ONCOLOGY | Facility: CLINIC | Age: 73
End: 2018-06-04

## 2018-06-04 DIAGNOSIS — C61 PROSTATE CANCER: Primary | ICD-10-CM

## 2018-06-04 NOTE — TELEPHONE ENCOUNTER
Diagnoses and all orders for this visit:    Prostate cancer  -     Case Request Operating Room: INSERTION, RADIOACTIVE SEED, PROSTATE

## 2018-06-04 NOTE — TELEPHONE ENCOUNTER
Diagnoses and all orders for this visit:    Prostate cancer  -     Case Request Operating Room: BIOPSY,PROSTATE,USING PROSTATE MAPPING MAPPING ONLY

## 2018-06-05 ENCOUNTER — CLINICAL SUPPORT (OUTPATIENT)
Dept: ELECTROPHYSIOLOGY | Facility: CLINIC | Age: 73
End: 2018-06-05
Attending: INTERNAL MEDICINE
Payer: MEDICARE

## 2018-06-05 ENCOUNTER — HOSPITAL ENCOUNTER (OUTPATIENT)
Dept: RADIATION THERAPY | Facility: HOSPITAL | Age: 73
Discharge: HOME | End: 2018-06-05
Attending: RADIOLOGY
Payer: MEDICARE

## 2018-06-05 DIAGNOSIS — Z95.818 STATUS POST PLACEMENT OF IMPLANTABLE LOOP RECORDER: ICD-10-CM

## 2018-06-05 DIAGNOSIS — I48.0 PAF (PAROXYSMAL ATRIAL FIBRILLATION): ICD-10-CM

## 2018-06-05 PROCEDURE — 93299 LOOP RECORDER REMOTE: CPT | Mod: S$GLB,,, | Performed by: INTERNAL MEDICINE

## 2018-06-05 PROCEDURE — 93298 REM INTERROG DEV EVAL SCRMS: CPT | Mod: S$GLB,,, | Performed by: INTERNAL MEDICINE

## 2018-06-06 ENCOUNTER — ANESTHESIA EVENT (OUTPATIENT)
Dept: SURGERY | Facility: HOSPITAL | Age: 73
End: 2018-06-06
Payer: MEDICARE

## 2018-06-06 RX ORDER — MULTIVITAMIN
1 TABLET ORAL DAILY
Status: ON HOLD | COMMUNITY
End: 2022-01-01 | Stop reason: HOSPADM

## 2018-06-06 NOTE — PRE ADMISSION SCREENING
Anesthesia Assessment: Preoperative EQUATION    Planned Procedure: Procedure(s) (LRB):  BIOPSY,PROSTATE,USING PROSTATE MAPPING MAPPING ONLY (N/A)  Requested Anesthesia Type:Monitor Anesthesia Care  Surgeon: Bipin Thompson MD  Service: Urology  Known or anticipated Date of Surgery:6/13/2018    Surgeon notes: reviewed    Electronic QUestionnaire Assessment completed via nurse interview with patient.      NO AQ    Triage considerations:     The patient has no apparent active cardiac condition (No unstable coronary Syndrome such as severe unstable angina or recent [<1 month] myocardial infarction, decompensated CHF, severe valvular   disease or significant arrhythmia)    Previous anesthesia records:GETA and No problems   Airway/Jaw/Neck:   04/13/2017  Airway Findings: Mouth Opening: Normal Tongue: Normal  General Airway Assessment: Adult  Mallampati: III  TM Distance: Normal, at least 6 cm  Jaw/Neck Findings:  Neck ROM: Normal ROM     04/13/17; Placement Time: 1220; Method of Intubation: Direct laryngoscopy; Inserted by: Anesthesia MD; Airway Device: Endotracheal Tube; Mask Ventilation: Easy - oral; Intubated: Postinduction; Blade: Karon #4; Airway Device Size: 8.0; Style: Cuffed; Cuff Inflation: Minimal occlusive pressure; Inflation Amount: 8; Placement Verified By: Auscultation, Capnometry, ETT Condensation; Grade: Grade I; Complicating Factors: Obesity (beard; deep glottis); Intubation Findings: Positive EtCO2, Bilateral breath sounds, Atraumatic/Condition of teeth unchanged;  Depth of Insertion: 23; Securment: Teeth; Complications: None; Breath Sounds: Equal Bilateral; Insertion Attempts: 1    Last PCP note: 3-6 months ago , within [facility]   Subspecialty notes: Cardiology: General, Rad/Onc    Other important co-morbidities:   1. Coronary artery disease involving native coronary artery of native heart without angina pectoris s/p RCA stent    2. Status post placement of implantable loop recorder    3. PAF  (paroxysmal atrial fibrillation)    4. PVD (peripheral vascular disease)    5. Essential hypertension    6. Morbid obesity with BMI of 40.0-44.9, adult    7. Obstructive sleep apnea on CPAP    8. Hyperlipidemia, unspecified hyperlipidemia type              Anxiety/Depression            GERD               Tests already available:  Available tests,  within 3 months , within [facility] .   5/2018 CMP, Mag   2/27/18  EKG            Instructions given. (See in Nurse's note)    Optimization:  Anesthesia Preop Clinic Assessment  Indicated-not required for this surgery         Sub-specialist consult indicated:   Cardiology        Plan:    Testing:  none needed      Consultation:Cardiology 5/31/8 Pre-op Clearance completed    5/31/2018  [facility] - Cardiology   Miguel Smith MD   Cardiology   Coronary artery disease involving native coronary artery of native heart without angina pectoris s/p RCA stent +7 more   Dx     Continue med therapy  -Emphasized sodium restriction  -Stable normal sinus rhythm on loop recorder interrogation  -Now on Eliquis for OAC  -Follow-up with rehabilitation and wound care  -Cleared at low to intermediate risk and okay to hold aspirin ×5 days and Eliquis ×2 days prior to the procedure        Patient  has previously scheduled Medical Appointment: 6/13 Podiatry    Navigation: Tests Scheduled. None needed             Straight Line to surgery.               No tests, anesthesia preop clinic visit, or consult required.     Bettina Kelly RN

## 2018-06-06 NOTE — ANESTHESIA PREPROCEDURE EVALUATION
Anesthesia Assessment: Preoperative EQUATION     Planned Procedure: Procedure(s) (LRB):  BIOPSY,PROSTATE,USING PROSTATE MAPPING MAPPING ONLY (N/A)  Requested Anesthesia Type:Monitor Anesthesia Care  Surgeon: Bipin Thompson MD  Service: Urology  Known or anticipated Date of Surgery:6/13/2018     Surgeon notes: reviewed     Electronic QUestionnaire Assessment completed via nurse interview with patient.      NO AQ     Triage considerations:      The patient has no apparent active cardiac condition (No unstable coronary Syndrome such as severe unstable angina or recent [<1 month] myocardial infarction, decompensated CHF, severe valvular   disease or significant arrhythmia)     Previous anesthesia records:GETA and No problems   Airway/Jaw/Neck:   04/13/2017  Airway Findings: Mouth Opening: Normal Tongue: Normal  General Airway Assessment: Adult  Mallampati: III  TM Distance: Normal, at least 6 cm  Jaw/Neck Findings:  Neck ROM: Normal ROM     04/13/17; Placement Time: 1220; Method of Intubation: Direct laryngoscopy; Inserted by: Anesthesia MD; Airway Device: Endotracheal Tube; Mask Ventilation: Easy - oral; Intubated: Postinduction; Blade: Karon #4; Airway Device Size: 8.0; Style: Cuffed; Cuff Inflation: Minimal occlusive pressure; Inflation Amount: 8; Placement Verified By: Auscultation, Capnometry, ETT Condensation; Grade: Grade I; Complicating Factors: Obesity (beard; deep glottis); Intubation Findings: Positive EtCO2, Bilateral breath sounds, Atraumatic/Condition of teeth unchanged;  Depth of Insertion: 23; Securment: Teeth; Complications: None; Breath Sounds: Equal Bilateral; Insertion Attempts: 1     Last PCP note: 3-6 months ago , within [facility]   Subspecialty notes: Cardiology: General, Rad/Onc     Other important co-morbidities:   1. Coronary artery disease involving native coronary artery of native heart without angina pectoris s/p RCA stent    2. Status post placement of implantable loop recorder    3.  PAF (paroxysmal atrial fibrillation)    4. PVD (peripheral vascular disease)    5. Essential hypertension    6. Morbid obesity with BMI of 40.0-44.9, adult    7. Obstructive sleep apnea on CPAP    8. Hyperlipidemia, unspecified hyperlipidemia type              Anxiety/Depression            GERD               Tests already available:  Available tests,  within 3 months , within [facility] .   5/2018 CMP, Mag   2/27/18  EKG                            Instructions given. (See in Nurse's note)     Optimization:  Anesthesia Preop Clinic Assessment  Indicated-not required for this surgery                            Sub-specialist consult indicated:   Cardiology                                        Plan:    Testing:  none needed                           Consultation:Cardiology 5/31/8 Pre-op Clearance completed     [facility]31/[facility]   Miguel Smith MD   Cardiology   Coronary artery disease involving native coronary artery of native heart without angina pectoris s/p RCA stent +7 more   Dx     Continue med therapy  -Emphasized sodium restriction  -Stable normal sinus rhythm on loop recorder interrogation  -Now on Eliquis for OAC  -Follow-up with rehabilitation and wound care  -Cleared at low to intermediate risk and okay to hold aspirin ×5 days and Eliquis ×2 days prior to the procedure                              Patient  has previously scheduled Medical Appointment: 6/13 Podiatry     Navigation: Tests Scheduled. None needed                        Straight Line to surgery.                          No tests, anesthesia preop clinic visit, or consult required.      Bettina Kelly RN                                               Electronically signed by Bettina Kelly RN at 6/6/2018 11:10 AM                                                                                                                  06/06/2018  Janusz Montgomery Jr. is a 73 y.o., male.    Anesthesia Evaluation    I have reviewed  the Patient Summary Reports.     I have reviewed the Medications.     Review of Systems  Anesthesia Hx:  No problems with previous Anesthesia  History of prior surgery of interest to airway management or planning:  Denies Personal Hx of Anesthesia complications.   Social:  Non-Smoker    Hematology/Oncology:  Hematology Normal   Oncology Normal     EENT/Dental:EENT/Dental Normal   Cardiovascular:  Cardiovascular Normal Exercise tolerance: good   Coronary Artery Disease:  Peripheral Arterial Disease  Hypertension, Essential Hypertension  Disorder of Cardiac Rhythm, Atrial Fibrillation, Paroxysmal Atrial Fibrillation  Other Cardiac Conditions Has implanted loop recorder  Pulmonary:  Pulmonary Normal  Pulmonary Symptoms:  are shortness of breath with activity.  Obstructive Sleep Apnea (CHARISMA), CPAP used.   Renal/:  Renal/ Normal     Hepatic/GI:  Hepatic/GI Normal  Esophageal / Stomach Disorders Gerd Controlled by chronic antireflux medication.    Musculoskeletal:  Musculoskeletal Normal  Joint Disease:  Arthritis, Osteoarthritis    Neurological:  Neurology Normal  Osteoarthritis    Dermatological:  Skin Normal    Psych:  Psychiatric Normal   Anxiety Disorder.  Depression.          Physical Exam  General:  Morbid Obesity    Airway/Jaw/Neck:  Airway Findings: Mouth Opening: Normal Tongue: Normal  General Airway Assessment: Adult, Average, Possible difficult mask airway  Mallampati: III  TM Distance: Normal, at least 6 cm     Eyes/Ears/Nose:  EYES/EARS/NOSE FINDINGS: Normal   Dental:  Dental Findings: In tact   Chest/Lungs:  Chest/Lungs Findings: Clear to auscultation, Normal Respiratory Rate     Heart/Vascular:  Heart Findings: Rate: Normal  Rhythm: Irregularly Irregular  Sounds: Normal  Heart murmur: negative       Mental Status:  Mental Status Findings:  Cooperative, Alert and Oriented         Anesthesia Plan  Type of Anesthesia, risks & benefits discussed:  Anesthesia Type:  general  Patient's Preference:   Intra-op  Monitoring Plan:   Intra-op Monitoring Plan Comments:   Post Op Pain Control Plan:   Post Op Pain Control Plan Comments:   Induction:   IV  Beta Blocker:  Patient is not currently on a Beta-Blocker (No further documentation required).       Informed Consent: Patient understands risks and agrees with Anesthesia plan.  Questions answered. Anesthesia consent signed with patient.  ASA Score: 3     Day of Surgery Review of History & Physical:    H&P update referred to the surgeon.     Anesthesia Plan Notes:   73M morbid obesity, large beard, CHARISMA/CPAP, controlled Afib, prostate CA for mapping under heavy sedation/GA NC TIVA        Ready For Surgery From Anesthesia Perspective.

## 2018-06-06 NOTE — PRE-PROCEDURE INSTRUCTIONS
Pre-op interview completed via phone with wife and patient  PreOp Instructions given:  - Medication information (what to hold and what to take)  - NPO guidelines  - Arrival place directions given  - Time to be given the day before procedure by the  Surgeon's Office  - Bathing with antibacterial soap/Hibiclens   - Don't wear any jewelry or bring any valuables AM of surgery  - No makeup or moisturizer to face  - No perfume/cologne, powder, lotions or aftershave     Patient was instructed to call and update the PreOp nurse about any changes to health, changes to medication, new office visits or hospitalizations between now and surgery.     Pt. verbalized understanding

## 2018-06-12 ENCOUNTER — TELEPHONE (OUTPATIENT)
Dept: UROLOGY | Facility: CLINIC | Age: 73
End: 2018-06-12

## 2018-06-13 ENCOUNTER — ANESTHESIA (OUTPATIENT)
Dept: SURGERY | Facility: HOSPITAL | Age: 73
End: 2018-06-13
Payer: MEDICARE

## 2018-06-13 ENCOUNTER — HOSPITAL ENCOUNTER (OUTPATIENT)
Facility: HOSPITAL | Age: 73
Discharge: HOME | End: 2018-06-13
Attending: UROLOGY | Admitting: UROLOGY
Payer: MEDICARE

## 2018-06-13 VITALS
DIASTOLIC BLOOD PRESSURE: 69 MMHG | HEIGHT: 73 IN | BODY MASS INDEX: 41.75 KG/M2 | SYSTOLIC BLOOD PRESSURE: 139 MMHG | OXYGEN SATURATION: 97 % | WEIGHT: 315 LBS | HEART RATE: 61 BPM | RESPIRATION RATE: 16 BRPM | TEMPERATURE: 98 F

## 2018-06-13 DIAGNOSIS — C61 PROSTATE CANCER: ICD-10-CM

## 2018-06-13 PROCEDURE — 63600175 PHARM REV CODE 636 W HCPCS: Performed by: NURSE ANESTHETIST, CERTIFIED REGISTERED

## 2018-06-13 PROCEDURE — 25000003 PHARM REV CODE 250: Performed by: NURSE ANESTHETIST, CERTIFIED REGISTERED

## 2018-06-13 PROCEDURE — D9220A PRA ANESTHESIA: Mod: ANES,,, | Performed by: ANESTHESIOLOGY

## 2018-06-13 PROCEDURE — 37000009 HC ANESTHESIA EA ADD 15 MINS: Performed by: UROLOGY

## 2018-06-13 PROCEDURE — 25000003 PHARM REV CODE 250: Performed by: STUDENT IN AN ORGANIZED HEALTH CARE EDUCATION/TRAINING PROGRAM

## 2018-06-13 PROCEDURE — 36000705 HC OR TIME LEV I EA ADD 15 MIN: Performed by: UROLOGY

## 2018-06-13 PROCEDURE — 36000704 HC OR TIME LEV I 1ST 15 MIN: Performed by: UROLOGY

## 2018-06-13 PROCEDURE — 76873 ECHOGRAP TRANS R PROS STUDY: CPT | Mod: 26,,, | Performed by: UROLOGY

## 2018-06-13 PROCEDURE — D9220A PRA ANESTHESIA: Mod: CRNA,,, | Performed by: NURSE ANESTHETIST, CERTIFIED REGISTERED

## 2018-06-13 PROCEDURE — 71000033 HC RECOVERY, INTIAL HOUR: Performed by: UROLOGY

## 2018-06-13 PROCEDURE — 25000003 PHARM REV CODE 250: Performed by: UROLOGY

## 2018-06-13 PROCEDURE — 37000008 HC ANESTHESIA 1ST 15 MINUTES: Performed by: UROLOGY

## 2018-06-13 PROCEDURE — 71000015 HC POSTOP RECOV 1ST HR: Performed by: UROLOGY

## 2018-06-13 PROCEDURE — 71000039 HC RECOVERY, EACH ADD'L HOUR: Performed by: UROLOGY

## 2018-06-13 RX ORDER — KETAMINE HYDROCHLORIDE 10 MG/ML
INJECTION, SOLUTION INTRAMUSCULAR; INTRAVENOUS
Status: DISCONTINUED | OUTPATIENT
Start: 2018-06-13 | End: 2018-06-13

## 2018-06-13 RX ORDER — LIDOCAINE HYDROCHLORIDE 10 MG/ML
1 INJECTION, SOLUTION EPIDURAL; INFILTRATION; INTRACAUDAL; PERINEURAL ONCE
Status: DISCONTINUED | OUTPATIENT
Start: 2018-06-13 | End: 2018-06-13 | Stop reason: HOSPADM

## 2018-06-13 RX ORDER — LIDOCAINE HCL/PF 100 MG/5ML
SYRINGE (ML) INTRAVENOUS
Status: DISCONTINUED | OUTPATIENT
Start: 2018-06-13 | End: 2018-06-13

## 2018-06-13 RX ORDER — LIDOCAINE HYDROCHLORIDE 20 MG/ML
JELLY TOPICAL
Status: DISCONTINUED | OUTPATIENT
Start: 2018-06-13 | End: 2018-06-13 | Stop reason: HOSPADM

## 2018-06-13 RX ORDER — PROPOFOL 10 MG/ML
VIAL (ML) INTRAVENOUS CONTINUOUS PRN
Status: DISCONTINUED | OUTPATIENT
Start: 2018-06-13 | End: 2018-06-13

## 2018-06-13 RX ORDER — PROPOFOL 10 MG/ML
VIAL (ML) INTRAVENOUS
Status: DISCONTINUED | OUTPATIENT
Start: 2018-06-13 | End: 2018-06-13

## 2018-06-13 RX ORDER — KETAMINE HCL IN 0.9 % NACL 50 MG/5 ML
SYRINGE (ML) INTRAVENOUS
Status: DISCONTINUED
Start: 2018-06-13 | End: 2018-06-13 | Stop reason: HOSPADM

## 2018-06-13 RX ORDER — SODIUM CHLORIDE 9 MG/ML
INJECTION, SOLUTION INTRAVENOUS CONTINUOUS
Status: DISCONTINUED | OUTPATIENT
Start: 2018-06-13 | End: 2018-06-13 | Stop reason: HOSPADM

## 2018-06-13 RX ORDER — SULFAMETHOXAZOLE AND TRIMETHOPRIM 800; 160 MG/1; MG/1
1 TABLET ORAL 2 TIMES DAILY
Qty: 14 TABLET | Refills: 0 | Status: SHIPPED | OUTPATIENT
Start: 2018-06-13 | End: 2018-06-20

## 2018-06-13 RX ORDER — MIDAZOLAM HYDROCHLORIDE 1 MG/ML
INJECTION, SOLUTION INTRAMUSCULAR; INTRAVENOUS
Status: DISCONTINUED | OUTPATIENT
Start: 2018-06-13 | End: 2018-06-13

## 2018-06-13 RX ORDER — ONDANSETRON 8 MG/1
8 TABLET, ORALLY DISINTEGRATING ORAL EVERY 8 HOURS PRN
Status: DISCONTINUED | OUTPATIENT
Start: 2018-06-13 | End: 2018-06-13 | Stop reason: HOSPADM

## 2018-06-13 RX ADMIN — PROPOFOL 100 MG: 10 INJECTION, EMULSION INTRAVENOUS at 01:06

## 2018-06-13 RX ADMIN — PROPOFOL 150 MCG/KG/MIN: 10 INJECTION, EMULSION INTRAVENOUS at 01:06

## 2018-06-13 RX ADMIN — SODIUM CHLORIDE: 0.9 INJECTION, SOLUTION INTRAVENOUS at 12:06

## 2018-06-13 RX ADMIN — MIDAZOLAM HYDROCHLORIDE 2 MG: 1 INJECTION, SOLUTION INTRAMUSCULAR; INTRAVENOUS at 01:06

## 2018-06-13 RX ADMIN — LIDOCAINE HYDROCHLORIDE 100 MG: 20 INJECTION, SOLUTION INTRAVENOUS at 01:06

## 2018-06-13 RX ADMIN — KETAMINE HYDROCHLORIDE 20 MG: 10 INJECTION, SOLUTION INTRAMUSCULAR; INTRAVENOUS at 01:06

## 2018-06-13 NOTE — ANESTHESIA POSTPROCEDURE EVALUATION
"Anesthesia Post Evaluation    Patient: Janusz Montgomery Jr.    Procedure(s) Performed: Procedure(s):  MAPPING-ULTRASOUND    Final Anesthesia Type: general  Patient location during evaluation: PACU  Patient participation: Yes- Able to Participate  Level of consciousness: awake and alert  Post-procedure vital signs: reviewed and stable  Pain management: adequate  Airway patency: patent  PONV status at discharge: No PONV  Anesthetic complications: no      Cardiovascular status: blood pressure returned to baseline  Respiratory status: unassisted  Hydration status: euvolemic  Follow-up not needed.        Visit Vitals  BP (!) 147/57   Pulse 62   Temp 36.8 °C (98.3 °F) (Skin)   Resp 16   Ht 6' 1" (1.854 m)   Wt (!) 163.3 kg (360 lb)   SpO2 100%   BMI 47.50 kg/m²       Pain/Serjio Score: Pain Assessment Performed: Yes (6/13/2018  1:57 PM)  Presence of Pain: non-verbal indicators absent (6/13/2018  1:57 PM)  Serjio Score: 9 (6/13/2018  1:57 PM)      "

## 2018-06-13 NOTE — DISCHARGE SUMMARY
[facility]  Discharge Note  Short Stay    Admit Date: 6/13/2018    Discharge Date and Time: 06/13/2018 2:15 PM      Attending Physician: Bipin Thompson MD     Discharge Provider: Justo Brown    Diagnoses:  Active Hospital Problems    Diagnosis  POA    Prostate cancer [C61]  Yes     - followed by urology, Dr. Young        Resolved Hospital Problems    Diagnosis Date Resolved POA   No resolved problems to display.       Discharged Condition: good    Hospital Course: Patient was admitted for Prostate mapping and tolerated the procedure well with no complications. The patient was discharged home in good condition on the same day.       Final Diagnoses: Same as principal problem.    Disposition: Home or Self Care    Follow up/Patient Instructions:    Medications:  Reconciled Home Medications: Current Discharge Medication List      START taking these medications    Details   bisacodyl (FLEET) 10 mg/30 mL Enem Place 30 mLs (10 mg total) rectally once. Use 2 morning before brachytherapy surgery  Qty: 2 enema, Refills: 0      sulfamethoxazole-trimethoprim 800-160mg (BACTRIM DS) 800-160 mg Tab Take 1 tablet by mouth 2 (two) times daily. Start taking night before brachytherapy seed insertion  Qty: 14 tablet, Refills: 0         CONTINUE these medications which have NOT CHANGED    Details   acyclovir (ZOVIRAX) 800 MG Tab Take 1 tablet (800 mg total) by mouth 2 (two) times daily.  Qty: 180 tablet, Refills: 6      apixaban 5 mg Tab Take 1 tablet (5 mg total) by mouth 2 (two) times daily.  Qty: 60 tablet, Refills: 11      artificial tears (ISOPTO TEARS) 0.5 % ophthalmic solution Place 1 drop into both eyes as needed.      aspirin (ECOTRIN) 81 MG EC tablet Take 1 tablet (81 mg total) by mouth once daily.  Refills: 0      atorvastatin (LIPITOR) 40 MG tablet Take 1 tablet (40 mg total) by mouth once daily.  Qty: 90 tablet, Refills: 0      CYANOCOBALAMIN, VITAMIN B-12, (VITAMIN B-12 ORAL) Take 2,500 mcg by mouth once  daily.      gabapentin (NEURONTIN) 300 MG capsule TAKE ONE CAPSULE BY MOUTH THREE TIMES DAILY  Qty: 90 capsule, Refills: 11    Comments: Please consider 90 day supplies to promote better adherence      melatonin 3 mg Tab Take 6 mg by mouth nightly as needed (for sleep).       multivitamin (MULTIPLE VITAMINS) per tablet Take 1 tablet by mouth once daily.      omeprazole (PRILOSEC) 20 MG capsule Take 1 capsule (20 mg total) by mouth daily as needed.  Qty: 90 capsule, Refills: 0    Associated Diagnoses: Gastroesophageal reflux disease without esophagitis      prednisoLONE acetate (PRED FORTE) 1 % DrpS Place 1 drop into the right eye 4 (four) times daily.  Qty: 1 Bottle, Refills: 2    Associated Diagnoses: Herpes simplex disciform keratitis      ramipril (ALTACE) 5 MG capsule Take 1 capsule (5 mg total) by mouth once daily.  Qty: 90 capsule, Refills: 1    Associated Diagnoses: Essential hypertension      tamsulosin (FLOMAX) 0.4 mg Cp24 TAKE ONE CAPSULE BY MOUTH ONCE DAILY  Qty: 90 capsule, Refills: 3    Associated Diagnoses: Urinary frequency      venlafaxine (EFFEXOR) 75 MG tablet Take 2 tablets (150 mg total) by mouth 2 (two) times daily.  Qty: 360 tablet, Refills: 1    Associated Diagnoses: Major depressive disorder, recurrent episode, mild      vitamin D 1000 units Tab Take 1 tablet (1,000 Units total) by mouth once daily.      zinc sulfate (ZINCATE) 220 (50) mg capsule Take 1 capsule (220 mg total) by mouth once daily.      furosemide (LASIX) 20 MG tablet Take 1 tablet (20 mg total) by mouth once daily.  Qty: 90 tablet, Refills: 0    Associated Diagnoses: Edema, unspecified type      sildenafil (VIAGRA) 100 MG tablet Take 1 tablet (100 mg total) by mouth daily as needed for Erectile Dysfunction.  Qty: 10 tablet, Refills: 11    Associated Diagnoses: ED (erectile dysfunction) of organic origin             Discharge Procedure Orders  Diet general     Call MD for:  temperature >100.4     Call MD for:  persistent nausea  and vomiting     Call MD for:  severe uncontrolled pain     Call MD for:  difficulty breathing, headache or visual disturbances     Call MD for:  hives     Call MD for:  persistent dizziness or light-headedness     Call MD for:  extreme fatigue       Follow-up Information     Bipin Thompson MD.    Specialty:  Urology  Why:  brachytherapy seed insertion  Contact information:  [address]  [facility] [address]  [phone]
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
## 2018-06-13 NOTE — PLAN OF CARE
Discharge instructions reviewed w/ pt and wife, verbalized understanding. Pt in NADN.No complaints at this time. Tolerated liquids and urinated w/ no issues. To be d/c'd home w/ wife. Paper rx given.

## 2018-06-13 NOTE — OP NOTE
[facility]  Operative Note    Date: 06/13/2018    Pre-Op Diagnosis: prostate cancer    Post-Op Diagnosis: same    Procedure(s) Performed:   1.  Prostate mapping  2.  Transrectal US    Specimen(s): none    Staff Surgeon: Bipin Thompson MD    Assistant Surgeon: Justo Brown MD    Anesthesia: General mask inhalational anesthesia    Indications: Janusz Montgomery Jr. is a 73 y.o. male with prostate cancer who has elected to pursue brachytherapy as treatment.  He presents today for prostate mapping in order to plan his seed implantation in a few weeks.      Findings:   30.9g prostate  9 images     Estimated Blood Loss: none    Drains: none    Procedure in detail:  After informed consent was obtained, the patient was transferred to the cystoscopy suite. And placed in the supine position on the cysto table. SCDs were applied and working.  Anesthesia was administered.  When the patient was adequately sedated he was placed in the exaggerated dorsal lithotomy position and prepped and draped in the usual sterile fashion.  A 16 Fr porter catheter was placed into the urethra and advanced into the bladder. The bladder was drained and then filled with 120 mL sterile water.  The balloon was filled with 10 mL sterile water.    The rectal probe was introduced into the rectum and the prostate was identified on ultrasound. 15 mL of water were inflated into the rectal balloon. A total of 9 images were obtained of the prostate, with a total prostate volume of 30.9 g.      The balloon was deflated, the probe was removed, and the patient was taken out of lithotomy. The porter catheter was removed.     The patient tolerated the procedure well and was transferred to the recovery room in stable condition.      Disposition:  The patient will return as scheduled for his seed implantation.  He was given prescriptions for bactrim and fleet enema x2 to take before his seed implant. He was instructed to stop his anticoagulation 1 week  before seed implant.     Justo Brown MD

## 2018-06-13 NOTE — DISCHARGE INSTRUCTIONS
Discharge Instructions: After Your Surgery  Youve just had surgery. During surgery, you were given medicine called anesthesia to keep you relaxed and free of pain. After surgery, you may have some pain or nausea. This is common. Here are some tips for feeling better and getting well after surgery.     Stay on schedule with your medicine.   Going home  Your healthcare provider will show you how to take care of yourself when you go home. He or she will also answer your questions. Have an adult family member or friend drive you home. For the first 24 hours after your surgery:  · Do not drive or use heavy equipment.  · Do not make important decisions or sign legal papers.  · Do not drink alcohol.  · Have someone stay with you, if needed. He or she can watch for problems and help keep you safe.  Be sure to go to all follow-up visits with your healthcare provider. And rest after your surgery for as long as your healthcare provider tells you to.  Coping with pain  If you have pain after surgery, pain medicine will help you feel better. Take it as told, before pain becomes severe. Also, ask your healthcare provider or pharmacist about other ways to control pain. This might be with heat, ice, or relaxation. And follow any other instructions your surgeon or nurse gives you.  Tips for taking pain medicine  To get the best relief possible, remember these points:  · Pain medicines can upset your stomach. Taking them with a little food may help.  · Most pain relievers taken by mouth need at least 20 to 30 minutes to start to work.  · Taking medicine on a schedule can help you remember to take it. Try to time your medicine so that you can take it before starting an activity. This might be before you get dressed, go for a walk, or sit down for dinner.  · Constipation is a common side effect of pain medicines. Call your healthcare provider before taking any medicines such as laxatives or stool softeners to help ease constipation.  Also ask if you should skip any foods. Drinking lots of fluids and eating foods such as fruits and vegetables that are high in fiber can also help. Remember, do not take laxatives unless your surgeon has prescribed them.  · Drinking alcohol and taking pain medicine can cause dizziness and slow your breathing. It can even be deadly. Do not drink alcohol while taking pain medicine.  · Pain medicine can make you react more slowly to things. Do not drive or run machinery while taking pain medicine.  Your healthcare provider may tell you to take acetaminophen to help ease your pain. Ask him or her how much you are supposed to take each day. Acetaminophen or other pain relievers may interact with your prescription medicines or other over-the-counter (OTC) medicines. Some prescription medicines have acetaminophen and other ingredients. Using both prescription and OTC acetaminophen for pain can cause you to overdose. Read the labels on your OTC medicines with care. This will help you to clearly know the list of ingredients, how much to take, and any warnings. It may also help you not take too much acetaminophen. If you have questions or do not understand the information, ask your pharmacist or healthcare provider to explain it to you before you take the OTC medicine.  Managing nausea  Some people have an upset stomach after surgery. This is often because of anesthesia, pain, or pain medicine, or the stress of surgery. These tips will help you handle nausea and eat healthy foods as you get better. If you were on a special food plan before surgery, ask your healthcare provider if you should follow it while you get better. These tips may help:  · Do not push yourself to eat. Your body will tell you when to eat and how much.  · Start off with clear liquids and soup. They are easier to digest.  · Next try semi-solid foods, such as mashed potatoes, applesauce, and gelatin, as you feel ready.  · Slowly move to solid foods. Dont  eat fatty, rich, or spicy foods at first.  · Do not force yourself to have 3 large meals a day. Instead eat smaller amounts more often.  · Take pain medicines with a small amount of solid food, such as crackers or toast, to avoid nausea.     Call your surgeon if  · You still have pain an hour after taking medicine. The medicine may not be strong enough.  · You feel too sleepy, dizzy, or groggy. The medicine may be too strong.  · You have side effects like nausea, vomiting, or skin changes, such as rash, itching, or hives.       If you have obstructive sleep apnea  You were given anesthesia medicine during surgery to keep you comfortable and free of pain. After surgery, you may have more apnea spells because of this medicine and other medicines you were given. The spells may last longer than usual.   At home:  · Keep using the continuous positive airway pressure (CPAP) device when you sleep. Unless your healthcare provider tells you not to, use it when you sleep, day or night. CPAP is a common device used to treat obstructive sleep apnea.  · Talk with your provider before taking any pain medicine, muscle relaxants, or sedatives. Your provider will tell you about the possible dangers of taking these medicines.  Date Last Reviewed: 12/1/2016 © 2000-2017 [employer]. [address] [place] [address], [place] [address]. All rights reserved. This information is not intended as a substitute for professional medical care. Always follow your healthcare professional's instructions.      Take antibiotics (bactrim) night before and morning of brachytherapy seed insertion, and continue 2x daily for one week after  Use fleet enema x2 morning before surgery   Stop taking aspirin 7 days before surgery  Stop taking eliquis 3 days before surgery

## 2018-06-13 NOTE — TRANSFER OF CARE
"Anesthesia Transfer of Care Note    Patient: Janusz Montgomery Jr.    Procedure(s) Performed: Procedure(s):  MAPPING-ULTRASOUND    Patient location: PACU    Anesthesia Type: general    Transport from OR: Transported from OR on room air with adequate spontaneous ventilation    Post pain: adequate analgesia    Post assessment: no apparent anesthetic complications    Post vital signs: stable    Level of consciousness: awake    Nausea/Vomiting: no nausea/vomiting    Complications: none    Transfer of care protocol was followed      Last vitals:   Visit Vitals  BP (!) 102/43 (BP Location: Left arm, Patient Position: Lying)   Pulse 81   Temp 36.8 °C (98.3 °F) (Skin)   Resp 16   Ht 6' 1" (1.854 m)   Wt (!) 163.3 kg (360 lb)   SpO2 96%   BMI 47.50 kg/m²     "

## 2018-06-21 ENCOUNTER — TELEPHONE (OUTPATIENT)
Dept: UROLOGY | Facility: CLINIC | Age: 73
End: 2018-06-21

## 2018-06-21 NOTE — TELEPHONE ENCOUNTER
Called pt to confirm 10am arrival time for procedure on 6/27. Gave pt NPO, fleets enema, and Cipro instructions and gave pt opportunity to ask questions. Pt verbalized understanding.

## 2018-06-22 ENCOUNTER — TELEPHONE (OUTPATIENT)
Dept: UROLOGY | Facility: CLINIC | Age: 73
End: 2018-06-22

## 2018-06-22 PROCEDURE — 77295 3-D RADIOTHERAPY PLAN: CPT | Mod: 26,,, | Performed by: RADIOLOGY

## 2018-06-22 PROCEDURE — 77295 3-D RADIOTHERAPY PLAN: CPT | Mod: TC | Performed by: RADIOLOGY

## 2018-06-22 NOTE — TELEPHONE ENCOUNTER
I called and left a message.    Dr. Matta?   Can we postpone his brachytherapy or is it too late to change it?  I told him that as long as he has no productive cough or fever, we can proceed as scheduled on 6/27.

## 2018-06-22 NOTE — TELEPHONE ENCOUNTER
----- Message from Mavis Dai LPN sent at 6/22/2018  9:04 AM CDT -----  Contact: Home: [phone] or [phone]  Pt is having seed implant on 6/27, he has developed a sore throat and congestion,. No fever     Wants to know if he needs to reschedule   ----- Message -----  From: Olivia Solo MA  Sent: 6/22/2018   8:53 AM  To: Jay SIBLEY Staff    Needs Advice    Reason for call:  Pt is having seed implant on 6/27, he has developed a sore throat and congestion,. No fever       Communication Preference: Home: [phone] or [phone]    Additional Information: will he need to reschedule?

## 2018-06-25 ENCOUNTER — OFFICE VISIT (OUTPATIENT)
Dept: FAMILY MEDICINE | Facility: CLINIC | Age: 73
End: 2018-06-25
Payer: MEDICARE

## 2018-06-25 VITALS
HEIGHT: 73 IN | BODY MASS INDEX: 41.75 KG/M2 | TEMPERATURE: 99 F | OXYGEN SATURATION: 95 % | HEART RATE: 79 BPM | SYSTOLIC BLOOD PRESSURE: 126 MMHG | DIASTOLIC BLOOD PRESSURE: 80 MMHG | WEIGHT: 315 LBS

## 2018-06-25 DIAGNOSIS — E78.5 HYPERLIPIDEMIA, UNSPECIFIED HYPERLIPIDEMIA TYPE: ICD-10-CM

## 2018-06-25 DIAGNOSIS — R09.81 NASAL CONGESTION: ICD-10-CM

## 2018-06-25 DIAGNOSIS — J01.00 ACUTE NON-RECURRENT MAXILLARY SINUSITIS: Primary | ICD-10-CM

## 2018-06-25 DIAGNOSIS — J02.9 SORE THROAT: ICD-10-CM

## 2018-06-25 DIAGNOSIS — I10 ESSENTIAL HYPERTENSION: ICD-10-CM

## 2018-06-25 PROCEDURE — 99214 OFFICE O/P EST MOD 30 MIN: CPT | Mod: S$GLB,,, | Performed by: FAMILY MEDICINE

## 2018-06-25 PROCEDURE — 3074F SYST BP LT 130 MM HG: CPT | Mod: CPTII,S$GLB,, | Performed by: FAMILY MEDICINE

## 2018-06-25 PROCEDURE — 3079F DIAST BP 80-89 MM HG: CPT | Mod: CPTII,S$GLB,, | Performed by: FAMILY MEDICINE

## 2018-06-25 PROCEDURE — 99999 PR PBB SHADOW E&M-EST. PATIENT-LVL III: CPT | Mod: PBBFAC,,, | Performed by: FAMILY MEDICINE

## 2018-06-25 PROCEDURE — 99499 UNLISTED E&M SERVICE: CPT | Mod: S$PBB,,, | Performed by: FAMILY MEDICINE

## 2018-06-25 RX ORDER — AMOXICILLIN 875 MG/1
875 TABLET, FILM COATED ORAL EVERY 12 HOURS
Qty: 20 TABLET | Refills: 0 | Status: ON HOLD | OUTPATIENT
Start: 2018-06-25 | End: 2018-08-08 | Stop reason: HOSPADM

## 2018-06-25 RX ORDER — FLUTICASONE PROPIONATE 50 MCG
2 SPRAY, SUSPENSION (ML) NASAL DAILY
Qty: 1 BOTTLE | Refills: 2 | Status: SHIPPED | OUTPATIENT
Start: 2018-06-25 | End: 2018-09-26

## 2018-06-25 NOTE — PROGRESS NOTES
Office Visit    Patient Name: Janusz Montgomery Jr.    : 1945  MRN: [id number]      Assessment/Plan:  Janusz Montgomery Jr. is a 73 y.o. male who presents today for :    Acute non-recurrent maxillary sinusitis  -     amoxicillin (AMOXIL) 875 MG tablet; Take 1 tablet (875 mg total) by mouth every 12 (twelve) hours.  Dispense: 20 tablet; Refill: 0    Nasal congestion  -     fluticasone (FLONASE) 50 mcg/actuation nasal spray; 2 sprays (100 mcg total) by Each Nare route once daily.  Dispense: 1 Bottle; Refill: 2  Sore throat  -start Abx. Counseled pt that cough may persist for up to 2-3 weeks  -advised cont supportive therapy and symptomatic management. May try Nasal Saline Rinses. Cepacol prn for sore throat  -advised to use air humidifier/filter at home, changing AC filters regularly, avoid second-hand smoking  -stressed hydration (water) and rest, as well as frequent hand washing and covering coughs to prevent spread of respiratory illnesses  -     Tylenol every 4-6 hours as needed for fever, headaches, sore throat, ear pain, bodyaches, and/or nasal/sinus inflammation  -RTC if Sx worsens or call clinic back if pt has any concerns.    Continue OTC anti-histamine for drainage    Essential hypertension  Hyperlipidemia, unspecified hyperlipidemia type  -BP controlled - continue current med regimen  -advised DASH diet      Follow-up for worsening Sx. Urgent care/ED precautions provided.     This note was created by combination of typed  and Dragon dictation.  Transcription errors may be present.  If there are any questions, please contact me.      ----------------------------------------------------------------------------------------------------------------------      HPI:  Janusz is a 73 y.o. male who presents today for:    Sinus Problem        This patient has multiple medical diagnoses as noted below.    This patient is new to me   Patient is here today for nasal congestion and sinus pressure for the past  week. He has a mild cough productive of clear phlegm, +drainage. Pt states that he also has sore throat that has improved since the past day, as well as the cough. Has been taking PRN Tylenol and Zyrtec with some relief. He has normal appetite.  +sick contact with similar Sx. He has tolerate Amoxicillin in the past without any adverse side effects.  No body aches  No ear pain  No F/C  Pt is compliant with daily BP medication at home - no side effects, BP controlled per log. No CP/SOB/leg swelling. No refills needed today          Additional ROS    No dysphagia  No CP/SOB/palpitations/swelling  No nausea/vomiting/abd pain/no diarrhea  No rashes    Patient Active Problem List   Diagnosis    Essential hypertension    Hyperlipidemia    Coronary artery disease involving native coronary artery of native heart without angina pectoris s/p RCA stent    Obstructive sleep apnea on CPAP    GERD (gastroesophageal reflux disease)    Vitamin D deficiency disease    BPH (benign prostatic hypertrophy) with urinary obstruction    Morbid obesity with BMI of 40.0-44.9, adult    Prostate cancer    Type III open fracture of left tibia and fibula with routine healing    Paroxysmal atrial fibrillation     Major depressive disorder, recurrent episode, mild    Generalized anxiety disorder    History of gout    Herpes simplex keratoconjunctivitis    Decreased range of motion of ankle    Restriction of joint motion    Heel cord tightness, right    Right leg weakness    Impaired mobility    Balance problems    PVD (peripheral vascular disease)       Current Medications  Medications reviewed and updated.       Current Outpatient Prescriptions:     acyclovir (ZOVIRAX) 800 MG Tab, Take 1 tablet (800 mg total) by mouth 2 (two) times daily., Disp: 180 tablet, Rfl: 6    apixaban 5 mg Tab, Take 1 tablet (5 mg total) by mouth 2 (two) times daily., Disp: 60 tablet, Rfl: 11    artificial tears (ISOPTO TEARS) 0.5 % ophthalmic  solution, Place 1 drop into both eyes as needed. (Patient taking differently: Place 1 drop into both eyes as needed (for dry eyes). ), Disp: , Rfl:     aspirin (ECOTRIN) 81 MG EC tablet, Take 1 tablet (81 mg total) by mouth once daily., Disp: , Rfl: 0    atorvastatin (LIPITOR) 40 MG tablet, Take 1 tablet (40 mg total) by mouth once daily., Disp: 90 tablet, Rfl: 0    CYANOCOBALAMIN, VITAMIN B-12, (VITAMIN B-12 ORAL), Take 2,500 mcg by mouth once daily., Disp: , Rfl:     furosemide (LASIX) 20 MG tablet, Take 1 tablet (20 mg total) by mouth once daily., Disp: 90 tablet, Rfl: 0    gabapentin (NEURONTIN) 300 MG capsule, TAKE ONE CAPSULE BY MOUTH THREE TIMES DAILY, Disp: 90 capsule, Rfl: 11    melatonin 3 mg Tab, Take 6 mg by mouth nightly as needed (for sleep). , Disp: , Rfl:     multivitamin (MULTIPLE VITAMINS) per tablet, Take 1 tablet by mouth once daily., Disp: , Rfl:     omeprazole (PRILOSEC) 20 MG capsule, Take 1 capsule (20 mg total) by mouth daily as needed., Disp: 90 capsule, Rfl: 0    prednisoLONE acetate (PRED FORTE) 1 % DrpS, Place 1 drop into the right eye 4 (four) times daily., Disp: 1 Bottle, Rfl: 2    ramipril (ALTACE) 5 MG capsule, Take 1 capsule (5 mg total) by mouth once daily., Disp: 90 capsule, Rfl: 1    sildenafil (VIAGRA) 100 MG tablet, Take 1 tablet (100 mg total) by mouth daily as needed for Erectile Dysfunction., Disp: 10 tablet, Rfl: 11    tamsulosin (FLOMAX) 0.4 mg Cp24, TAKE ONE CAPSULE BY MOUTH ONCE DAILY, Disp: 90 capsule, Rfl: 3    venlafaxine (EFFEXOR) 75 MG tablet, Take 2 tablets (150 mg total) by mouth 2 (two) times daily., Disp: 360 tablet, Rfl: 1    vitamin D 1000 units Tab, Take 1 tablet (1,000 Units total) by mouth once daily., Disp: , Rfl:     zinc sulfate (ZINCATE) 220 (50) mg capsule, Take 1 capsule (220 mg total) by mouth once daily., Disp: , Rfl:     amoxicillin (AMOXIL) 875 MG tablet, Take 1 tablet (875 mg total) by mouth every 12 (twelve) hours., Disp: 20  tablet, Rfl: 0    fluticasone (FLONASE) 50 mcg/actuation nasal spray, 2 sprays (100 mcg total) by Each Nare route once daily., Disp: 1 Bottle, Rfl: 2    Past Surgical History:   Procedure Laterality Date    Cardiac stenting x2      CATARACT EXTRACTION W/  INTRAOCULAR LENS IMPLANT Right 3/29/2016    Dr. Conteh    CATARACT EXTRACTION W/  INTRAOCULAR LENS IMPLANT Left 4/12/2016        EXTERNAL FIXATION TIBIAL FRACTURE Left 07/01/2016    ORIF TIBIA FRACTURE Left 07/15/2016    Squamous cell cancer removal x3 with Mohs surgery      TONSILLECTOMY      TOTAL KNEE ARTHROPLASTY  10/2012    trus/bx         Family History   Problem Relation Age of Onset    Skin cancer Father     Lung cancer Father     Cancer Father         smoker,     Alzheimer's disease Mother     Hypertension Mother     Cancer Sister         colon, lung cancer     Cancer Brother         skin cancer, polypectomy     Peripheral vascular disease Unknown     Melanoma Neg Hx     Psoriasis Neg Hx     Lupus Neg Hx     Eczema Neg Hx     Amblyopia Neg Hx     Blindness Neg Hx     Cataracts Neg Hx     Diabetes Neg Hx     Glaucoma Neg Hx     Macular degeneration Neg Hx     Retinal detachment Neg Hx     Strabismus Neg Hx     Stroke Neg Hx     Thyroid disease Neg Hx     Acne Neg Hx        Social History     Social History    Marital status:      Spouse name: N/A    Number of children: N/A    Years of education: N/A     Occupational History    Retired       Social History Main Topics    Smoking status: Never Smoker    Smokeless tobacco: Never Used    Alcohol use Yes      Comment: occasionally    Drug use: No    Sexual activity: Yes     Other Topics Concern    Not on file     Social History Narrative    No narrative on file             Allergies   Review of patient's allergies indicates:   Allergen Reactions    Ciprofloxacin Rash     Diffuse pruritic morbilliform rash developed 3/15/2017 after dose of  "cipro; previously in 2/2017 he had rash/fevers after initiation of cipro    Zosyn [piperacillin-tazobactam] Anaphylaxis     Diffuse pruritic morbilliform rash developed 3/15/2017.  Then, 430am dose on 3/16 and rash worsened with SOB/tachypnea but no hypoxemia.     Bacitracin Itching and Rash     Violaceous rash in area of topical Tx.              Review of Systems  See HPI      Physical Exam  /80   Pulse 79   Temp 98.5 °F (36.9 °C) (Oral)   Ht 6' 1" (1.854 m)   Wt (!) 164.3 kg (362 lb 3.5 oz)   SpO2 95%   BMI 47.79 kg/m²     GEN: NAD, well developed, appears tired but nontoxic  HEENT: NCAT, PERRLA, EOMI, sclera clear, anicteric, TM clear bilaterally with normal light reflex, mild nasal turbinate swelling, MMM with no lesions, O/P clear - no tonsillar swelling/discharge, +mild drainage, +minimal clear nasal discharge,+mild frontal/maxillary TTP,No trismus/uvula deviation  NECK: normal, supple with midline trachea, no LAD, no thyromegaly  LUNGS: CTAB, no w/r/r, no increased work of breathing  HEART: RRR, normal S1 and S2, no m/r/g  ABD: s/nt/nd, NABS  SKIN: normal turgor, no rashes, no other lesions.   PSYCH: AOx3, appropriate mood and affect    "

## 2018-07-09 ENCOUNTER — CLINICAL SUPPORT (OUTPATIENT)
Dept: ELECTROPHYSIOLOGY | Facility: CLINIC | Age: 73
End: 2018-07-09
Attending: INTERNAL MEDICINE
Payer: MEDICARE

## 2018-07-09 DIAGNOSIS — Z95.818 STATUS POST PLACEMENT OF IMPLANTABLE LOOP RECORDER: ICD-10-CM

## 2018-07-09 DIAGNOSIS — I48.0 PAF (PAROXYSMAL ATRIAL FIBRILLATION): ICD-10-CM

## 2018-07-09 PROCEDURE — 93298 REM INTERROG DEV EVAL SCRMS: CPT | Mod: S$GLB,,, | Performed by: INTERNAL MEDICINE

## 2018-07-09 PROCEDURE — 93299 LOOP RECORDER REMOTE: CPT | Mod: S$GLB,,, | Performed by: INTERNAL MEDICINE

## 2018-07-13 ENCOUNTER — TELEPHONE (OUTPATIENT)
Dept: OPTOMETRY | Facility: CLINIC | Age: 73
End: 2018-07-13

## 2018-07-25 DIAGNOSIS — Z01.818 PREOP TESTING: Primary | ICD-10-CM

## 2018-07-26 ENCOUNTER — ANESTHESIA EVENT (OUTPATIENT)
Dept: SURGERY | Facility: HOSPITAL | Age: 73
End: 2018-07-26
Payer: MEDICARE

## 2018-07-26 ENCOUNTER — OFFICE VISIT (OUTPATIENT)
Dept: FAMILY MEDICINE | Facility: CLINIC | Age: 73
End: 2018-07-26
Payer: MEDICARE

## 2018-07-26 ENCOUNTER — LAB VISIT (OUTPATIENT)
Dept: LAB | Facility: HOSPITAL | Age: 73
End: 2018-07-26
Attending: ANESTHESIOLOGY
Payer: MEDICARE

## 2018-07-26 VITALS
BODY MASS INDEX: 41.75 KG/M2 | HEIGHT: 73 IN | OXYGEN SATURATION: 96 % | DIASTOLIC BLOOD PRESSURE: 76 MMHG | HEART RATE: 81 BPM | WEIGHT: 315 LBS | SYSTOLIC BLOOD PRESSURE: 138 MMHG | TEMPERATURE: 98 F

## 2018-07-26 DIAGNOSIS — H65.93 FLUID LEVEL BEHIND TYMPANIC MEMBRANE OF BOTH EARS: ICD-10-CM

## 2018-07-26 DIAGNOSIS — Z01.818 PREOP TESTING: ICD-10-CM

## 2018-07-26 DIAGNOSIS — H93.8X3 EAR FULLNESS, BILATERAL: Primary | ICD-10-CM

## 2018-07-26 DIAGNOSIS — H91.8X3 OTHER SPECIFIED HEARING LOSS OF BOTH EARS: ICD-10-CM

## 2018-07-26 LAB
ANION GAP SERPL CALC-SCNC: 7 MMOL/L
BUN SERPL-MCNC: 16 MG/DL
CALCIUM SERPL-MCNC: 9.3 MG/DL
CHLORIDE SERPL-SCNC: 108 MMOL/L
CO2 SERPL-SCNC: 25 MMOL/L
CREAT SERPL-MCNC: 1.2 MG/DL
EST. GFR  (AFRICAN AMERICAN): >60 ML/MIN/1.73 M^2
EST. GFR  (NON AFRICAN AMERICAN): >60 ML/MIN/1.73 M^2
GLUCOSE SERPL-MCNC: 109 MG/DL
POTASSIUM SERPL-SCNC: 4.5 MMOL/L
SODIUM SERPL-SCNC: 140 MMOL/L

## 2018-07-26 PROCEDURE — 3075F SYST BP GE 130 - 139MM HG: CPT | Mod: CPTII,S$GLB,, | Performed by: FAMILY MEDICINE

## 2018-07-26 PROCEDURE — 99999 PR PBB SHADOW E&M-EST. PATIENT-LVL III: CPT | Mod: PBBFAC,,, | Performed by: FAMILY MEDICINE

## 2018-07-26 PROCEDURE — 3078F DIAST BP <80 MM HG: CPT | Mod: CPTII,S$GLB,, | Performed by: FAMILY MEDICINE

## 2018-07-26 PROCEDURE — 36415 COLL VENOUS BLD VENIPUNCTURE: CPT | Mod: PO

## 2018-07-26 PROCEDURE — 99213 OFFICE O/P EST LOW 20 MIN: CPT | Mod: S$GLB,,, | Performed by: FAMILY MEDICINE

## 2018-07-26 PROCEDURE — 80048 BASIC METABOLIC PNL TOTAL CA: CPT

## 2018-07-26 NOTE — ANESTHESIA PREPROCEDURE EVALUATION
Anesthesia Assessment: Preoperative EQUATION     Planned Procedure: Procedure(s) (LRB):  INSERTION, RADIOACTIVE SEED, PROSTATE (N/A)  Requested Anesthesia Type:Monitor Anesthesia Care  Surgeon: Bipin Thompson MD  Service: Urology  Known or anticipated Date of Surgery:8/8/2018     Surgeon notes: reviewed     Electronic QUestionnaire Assessment completed via nurse interview with patient.      NO AQ     Triage considerations:      The patient has no apparent active cardiac condition (No unstable coronary Syndrome such as severe unstable angina or recent [<1 month] myocardial infarction, decompensated CHF, severe valvular   disease or significant arrhythmia)     Previous anesthesia records:GETA and No problems   06/13/2018  Anesthesia Hx:  No problems with previous Anesthesia  History of prior surgery of interest to airway management or planning:  Denies Personal Hx of Anesthesia complications.   Airway/Jaw/Neck:  Airway Findings: Mouth Opening: Normal Tongue: Normal  General Airway Assessment: Adult, Average, Possible difficult mask airway  Mallampati: III  TM Distance: Normal, at least 6 cm        Last PCP note: within 3 months , within [facility]   Subspecialty notes: Cardiology: General     Other important co-morbidities:   1. Coronary artery disease involving native coronary artery of native heart without angina pectoris s/p RCA stent    2. Status post placement of implantable loop recorder    3. PAF (paroxysmal atrial fibrillation)    4. PVD (peripheral vascular disease)    5. Essential hypertension    6. Morbid obesity with BMI of 40.0-44.9, adult    7. Obstructive sleep apnea on CPAP    8. Hyperlipidemia, unspecified hyperlipidemia type              Anxiety/Depression            GERD     Tests already available:  Available tests,  6-12 months ago , within [facility] .  2/2018 EKG                            Instructions given. (See in Nurse's note)     Optimization:  Anesthesia Preop Clinic Assessment  Indicated-not  required for this surgery               Sub-specialist consult indicated:  Cardiology instructions for aspirin, Eliquis    From: Bettina Kelly RN   Sent: 7/26/2018   9:08 AM   To: Bettina Kelly RN, Miguel Smith MD, *   Subject: Pre-op instructions for aspirin, Eliquis       Patient is scheduled for surgery on 8/8/18 for insertion, radioactive seed, prostate with Dr Thompson. His original date of surgery was on 6/27 but was postponed due to a sinus infection.  Pre-op instructions for aspirin, Eliquis were given by you in your 5/31/18 note.   May patient hold aspirin for 5 days and Eliquis for 2 days prior to surgery as per your previous instructions?   RE: Pre-op instructions for aspirin, Eliquis   Received: Yesterday   Message Contents   MD Bettina Spring RN          Yes.  Okay to proceed as indicated    --Sarah                           Plan:    Testing:  [facility]                           Patient  has previously scheduled Medical Appointment: 7/26 [facility]     Navigation: Tests Scheduled. [facility] 7/26                        Results will be tracked by Preop Clinic.     Bettina Kelly RN                           Electronically signed by Bettina Kelly RN at 7/26/2018 10:05 AM                                                                                                                  07/26/2018  Janusz Montgomery Jr. is a 73 y.o., male.    Anesthesia Evaluation         Review of Systems  Anesthesia Hx:  No problems with previous Anesthesia   Social:  Non-Smoker    Cardiovascular:   Hypertension CAD  CABG/stent  PVD  Coronary Artery Disease:  Deep Venous Thrombosis (DVT), Hx of DVT, recent DVT (<6 months)  Hypertension, Essential Hypertension  Disorder of Cardiac Rhythm, Atrial Fibrillation, Paroxysmal Atrial Fibrillation    Pulmonary:   Sleep Apnea  Obstructive Sleep Apnea (CHARISMA), CPAP used.   Hepatic/GI:   GERD  Esophageal / Stomach Disorders Gerd     Musculoskeletal:  Joint Disease:  Arthritis, Osteoarthritis, Gout    Neurological:  Osteoarthritis    Psych:   anxiety depression          Physical Exam  General:  Well nourished, Morbid Obesity    Airway/Jaw/Neck:  Airway Findings: Mouth Opening: Normal Tongue: Normal  General Airway Assessment: Adult  Mallampati: II  Improves to II with phonation.  TM Distance: Normal, at least 6 cm  Jaw/Neck Findings:  Neck ROM: Normal ROM      Dental:  Dental Findings: In tact   Chest/Lungs:  Chest/Lungs Findings: Clear to auscultation, Normal Respiratory Rate     Heart/Vascular:  Heart Findings: Rate: Normal  Rhythm: Regular Rhythm  Sounds: Normal             Anesthesia Plan  Type of Anesthesia, risks & benefits discussed:  Anesthesia Type:  MAC, general  Patient's Preference: MAC  Intra-op Monitoring Plan:   Intra-op Monitoring Plan Comments:   Post Op Pain Control Plan:   Post Op Pain Control Plan Comments:   Induction:   IV  Beta Blocker:  Patient is not currently on a Beta-Blocker (No further documentation required).       Informed Consent: Patient understands risks and agrees with Anesthesia plan.  Questions answered. Anesthesia consent signed with patient.  ASA Score: 3     Day of Surgery Review of History & Physical: I have interviewed and examined the patient. I have reviewed the patient's H&P dated:  There are no significant changes.          Ready For Surgery From Anesthesia Perspective.

## 2018-07-26 NOTE — PRE ADMISSION SCREENING
Anesthesia Assessment: Preoperative EQUATION    Planned Procedure: Procedure(s) (LRB):  INSERTION, RADIOACTIVE SEED, PROSTATE (N/A)  Requested Anesthesia Type:Monitor Anesthesia Care  Surgeon: Bipin Thompson MD  Service: Urology  Known or anticipated Date of Surgery:8/8/2018    Surgeon notes: reviewed    Electronic QUestionnaire Assessment completed via nurse interview with patient.      NO AQ    Triage considerations:     The patient has no apparent active cardiac condition (No unstable coronary Syndrome such as severe unstable angina or recent [<1 month] myocardial infarction, decompensated CHF, severe valvular   disease or significant arrhythmia)    Previous anesthesia records:GETA and No problems   06/13/2018  Anesthesia Hx:  No problems with previous Anesthesia  History of prior surgery of interest to airway management or planning:  Denies Personal Hx of Anesthesia complications.   Airway/Jaw/Neck:  Airway Findings: Mouth Opening: Normal Tongue: Normal  General Airway Assessment: Adult, Average, Possible difficult mask airway  Mallampati: III  TM Distance: Normal, at least 6 cm       Last PCP note: within 3 months , within [facility]   Subspecialty notes: Cardiology: General    Other important co-morbidities:   1. Coronary artery disease involving native coronary artery of native heart without angina pectoris s/p RCA stent    2. Status post placement of implantable loop recorder    3. PAF (paroxysmal atrial fibrillation)    4. PVD (peripheral vascular disease)    5. Essential hypertension    6. Morbid obesity with BMI of 40.0-44.9, adult    7. Obstructive sleep apnea on CPAP    8. Hyperlipidemia, unspecified hyperlipidemia type              Anxiety/Depression            GERD     Tests already available:  Available tests,  6-12 months ago , within [facility] .  2/2018 EKG            Instructions given. (See in Nurse's note)    Optimization:  Anesthesia Preop Clinic Assessment  Indicated-not required for this surgery          Sub-specialist consult indicated:  Cardiology instructions for aspirin, Eliquis       Plan:    Testing:  [facility]      Patient  has previously scheduled Medical Appointment: 7/26 Family [facility]    Navigation: Tests Scheduled. [facility] 7/26             Results will be tracked by Preop Clinic.    Bettina Kelly RN

## 2018-07-26 NOTE — PROGRESS NOTES
Office Visit    Patient Name: Janusz Montgomery Jr.    : 1945  MRN: [id number]      Assessment/Plan:  Janusz Montgomery Jr. is a 73 y.o. male who presents today for :    Ear fullness, bilateral    Fluid level behind tympanic membrane of both ears    Other specified hearing loss of both ears      -no evidence of infection today, given history, will have him follow up with ENT as he is due for visit. Reassured pt with supportive therapy and symptomatic management. Avoid letting fluids in ear while bathing/swimming given prior history.  -stressed hydration (water) and rest, as well as frequent hand washing and covering coughs  -if pain or fever develops, may take Tylenol as needed and RTC for eval.      Follow-up for worsening Sx or as needed.     This note was created by combination of typed  and Dragon dictation.  Transcription errors may be present.  If there are any questions, please contact me.      ----------------------------------------------------------------------------------------------------------------------      HPI:  Janusz is a 73 y.o. male who presents today for:    Ear Fullness      This patient has multiple medical diagnoses as noted below.    Patient is here today for bilateral ear fullness the past few days - no pain/ear drainage/F/C/n/V/sore throat/nasal congestion/coughing. He reports a history of numerous ear infections in the past, where he has regularly f/u with ENT - last visit was about a year ago, which pt admits he is overdue for a visit with ENT.  Pt has h/o hard of hearing and normally wears his hearing aid. No body aches. No F/C. He was treated with Augmentin a month ago for sinusitis, which had resolved.      Additional ROS    No dysphagia  No CP/SOB/palpitations/swelling  No nausea/vomiting/abd pain/no diarrhea  No rashes    Patient Care Team:  Miguelina Weathers MD as PCP - General (Internal Medicine)  Miguelina Weathers MD as PCP - Astrid CHISHOLM/Sekou Guardado,  MD (Cardiology)  Luis Palacio MD as Consulting Physician (Cardiology)  Jessica Herndon MD (Inactive) (Nephrology)  Colt Sosa MD as Consulting Physician (Plastic Surgery)  AURELIANO Minor MD as Consulting Physician (Urology)  Walter Cortés MD as Consulting Physician (Orthopedic Surgery)    Patient Active Problem List   Diagnosis    Essential hypertension    Hyperlipidemia    Coronary artery disease involving native coronary artery of native heart without angina pectoris s/p RCA stent    Obstructive sleep apnea on CPAP    GERD (gastroesophageal reflux disease)    Vitamin D deficiency disease    BPH (benign prostatic hypertrophy) with urinary obstruction    Morbid obesity with BMI of 40.0-44.9, adult    Prostate cancer    Type III open fracture of left tibia and fibula with routine healing    Paroxysmal atrial fibrillation     Major depressive disorder, recurrent episode, mild    Generalized anxiety disorder    History of gout    Herpes simplex keratoconjunctivitis    Decreased range of motion of ankle    Restriction of joint motion    Heel cord tightness, right    Right leg weakness    Impaired mobility    Balance problems    PVD (peripheral vascular disease)       Current Medications  Medications reviewed and updated.       Current Outpatient Prescriptions:     acyclovir (ZOVIRAX) 800 MG Tab, Take 1 tablet (800 mg total) by mouth 2 (two) times daily., Disp: 180 tablet, Rfl: 6    amoxicillin (AMOXIL) 875 MG tablet, Take 1 tablet (875 mg total) by mouth every 12 (twelve) hours., Disp: 20 tablet, Rfl: 0    apixaban 5 mg Tab, Take 1 tablet (5 mg total) by mouth 2 (two) times daily., Disp: 60 tablet, Rfl: 11    artificial tears (ISOPTO TEARS) 0.5 % ophthalmic solution, Place 1 drop into both eyes as needed. (Patient taking differently: Place 1 drop into both eyes as needed (for dry eyes). ), Disp: , Rfl:     aspirin (ECOTRIN) 81 MG EC tablet, Take 1 tablet (81 mg total) by  mouth once daily., Disp: , Rfl: 0    atorvastatin (LIPITOR) 40 MG tablet, Take 1 tablet (40 mg total) by mouth once daily., Disp: 90 tablet, Rfl: 0    CYANOCOBALAMIN, VITAMIN B-12, (VITAMIN B-12 ORAL), Take 2,500 mcg by mouth once daily., Disp: , Rfl:     fluticasone (FLONASE) 50 mcg/actuation nasal spray, 2 sprays (100 mcg total) by Each Nare route once daily., Disp: 1 Bottle, Rfl: 2    furosemide (LASIX) 20 MG tablet, Take 1 tablet (20 mg total) by mouth once daily., Disp: 90 tablet, Rfl: 0    gabapentin (NEURONTIN) 300 MG capsule, TAKE ONE CAPSULE BY MOUTH THREE TIMES DAILY, Disp: 90 capsule, Rfl: 11    melatonin 3 mg Tab, Take 6 mg by mouth nightly as needed (for sleep). , Disp: , Rfl:     multivitamin (MULTIPLE VITAMINS) per tablet, Take 1 tablet by mouth once daily., Disp: , Rfl:     omeprazole (PRILOSEC) 20 MG capsule, Take 1 capsule (20 mg total) by mouth daily as needed., Disp: 90 capsule, Rfl: 0    prednisoLONE acetate (PRED FORTE) 1 % DrpS, Place 1 drop into the right eye 4 (four) times daily., Disp: 1 Bottle, Rfl: 2    ramipril (ALTACE) 5 MG capsule, Take 1 capsule (5 mg total) by mouth once daily., Disp: 90 capsule, Rfl: 1    sildenafil (VIAGRA) 100 MG tablet, Take 1 tablet (100 mg total) by mouth daily as needed for Erectile Dysfunction., Disp: 10 tablet, Rfl: 11    tamsulosin (FLOMAX) 0.4 mg Cp24, TAKE ONE CAPSULE BY MOUTH ONCE DAILY, Disp: 90 capsule, Rfl: 3    venlafaxine (EFFEXOR) 75 MG tablet, Take 2 tablets (150 mg total) by mouth 2 (two) times daily., Disp: 360 tablet, Rfl: 1    vitamin D 1000 units Tab, Take 1 tablet (1,000 Units total) by mouth once daily., Disp: , Rfl:     zinc sulfate (ZINCATE) 220 (50) mg capsule, Take 1 capsule (220 mg total) by mouth once daily., Disp: , Rfl:     Past Surgical History:   Procedure Laterality Date    Cardiac stenting x2      CATARACT EXTRACTION W/  INTRAOCULAR LENS IMPLANT Right 3/29/2016    Dr. Conteh    CATARACT EXTRACTION W/   INTRAOCULAR LENS IMPLANT Left 4/12/2016        EXTERNAL FIXATION TIBIAL FRACTURE Left 07/01/2016    ORIF TIBIA FRACTURE Left 07/15/2016    Squamous cell cancer removal x3 with Mohs surgery      TONSILLECTOMY      TOTAL KNEE ARTHROPLASTY  10/2012    trus/bx         Family History   Problem Relation Age of Onset    Skin cancer Father     Lung cancer Father     Cancer Father         smoker,     Alzheimer's disease Mother     Hypertension Mother     Cancer Sister         colon, lung cancer     Cancer Brother         skin cancer, polypectomy     Peripheral vascular disease Unknown     Melanoma Neg Hx     Psoriasis Neg Hx     Lupus Neg Hx     Eczema Neg Hx     Amblyopia Neg Hx     Blindness Neg Hx     Cataracts Neg Hx     Diabetes Neg Hx     Glaucoma Neg Hx     Macular degeneration Neg Hx     Retinal detachment Neg Hx     Strabismus Neg Hx     Stroke Neg Hx     Thyroid disease Neg Hx     Acne Neg Hx        Social History     Social History    Marital status:      Spouse name: N/A    Number of children: N/A    Years of education: N/A     Occupational History    Retired       Social History Main Topics    Smoking status: Never Smoker    Smokeless tobacco: Never Used    Alcohol use Yes      Comment: occasionally    Drug use: No    Sexual activity: Yes     Other Topics Concern    Not on file     Social History Narrative    No narrative on file             Allergies   Review of patient's allergies indicates:   Allergen Reactions    Ciprofloxacin Rash     Diffuse pruritic morbilliform rash developed 3/15/2017 after dose of cipro; previously in 2/2017 he had rash/fevers after initiation of cipro    Zosyn [piperacillin-tazobactam] Anaphylaxis     Diffuse pruritic morbilliform rash developed 3/15/2017.  Then, 430am dose on 3/16 and rash worsened with SOB/tachypnea but no hypoxemia.     Bacitracin Itching and Rash     Violaceous rash in area of topical Tx.   "            Review of Systems  See HPI      Physical Exam  /76   Pulse 81   Temp 98 °F (36.7 °C) (Oral)   Ht 6' 1" (1.854 m)   Wt (!) 164.2 kg (361 lb 15.9 oz)   SpO2 96%   BMI 47.76 kg/m²     GEN: NAD, patient is hard of hearing  HEENT: NCAT, PERRLA, EOMI, sclera clear, anicteric, TM with mild effusion but no erythema/perforation bilaterally, he does have baseline hard of hearing, mild nasal turbinate swelling, MMM with no lesions, O/P clear - no tonsillar swelling/discharge  NECK: normal, supple with midline trachea, no LAD, no thyromegaly  LUNGS: CTAB, no w/r/r, no increased work of breathing  HEART: RRR, normal S1 and S2, no m/r/g  ABD: s/nt/nd, NABS  SKIN: normal turgor, no rashes, no other lesions.   PSYCH: AOx3, appropriate mood and affect    "

## 2018-08-01 ENCOUNTER — HOSPITAL ENCOUNTER (OUTPATIENT)
Dept: RADIATION THERAPY | Facility: HOSPITAL | Age: 73
Discharge: HOME | End: 2018-08-01
Attending: RADIOLOGY
Payer: MEDICARE

## 2018-08-07 ENCOUNTER — TELEPHONE (OUTPATIENT)
Dept: UROLOGY | Facility: CLINIC | Age: 73
End: 2018-08-07

## 2018-08-08 ENCOUNTER — SURGERY (OUTPATIENT)
Age: 73
End: 2018-08-08

## 2018-08-08 ENCOUNTER — HOSPITAL ENCOUNTER (OUTPATIENT)
Facility: HOSPITAL | Age: 73
Discharge: HOME | End: 2018-08-08
Attending: UROLOGY | Admitting: UROLOGY
Payer: MEDICARE

## 2018-08-08 ENCOUNTER — ANESTHESIA (OUTPATIENT)
Dept: SURGERY | Facility: HOSPITAL | Age: 73
End: 2018-08-08
Payer: MEDICARE

## 2018-08-08 ENCOUNTER — HOSPITAL ENCOUNTER (OUTPATIENT)
Dept: RADIOLOGY | Facility: HOSPITAL | Age: 73
Discharge: HOME | End: 2018-08-08
Attending: UROLOGY | Admitting: UROLOGY
Payer: MEDICARE

## 2018-08-08 VITALS
DIASTOLIC BLOOD PRESSURE: 67 MMHG | TEMPERATURE: 98 F | RESPIRATION RATE: 16 BRPM | BODY MASS INDEX: 41.75 KG/M2 | OXYGEN SATURATION: 96 % | HEIGHT: 73 IN | WEIGHT: 315 LBS | HEART RATE: 87 BPM | SYSTOLIC BLOOD PRESSURE: 149 MMHG

## 2018-08-08 DIAGNOSIS — C61 PROSTATE CANCER: Primary | ICD-10-CM

## 2018-08-08 DIAGNOSIS — C61 PROSTATE CANCER: ICD-10-CM

## 2018-08-08 PROCEDURE — 63600175 PHARM REV CODE 636 W HCPCS: Performed by: NURSE ANESTHETIST, CERTIFIED REGISTERED

## 2018-08-08 PROCEDURE — 37000009 HC ANESTHESIA EA ADD 15 MINS: Performed by: UROLOGY

## 2018-08-08 PROCEDURE — 77370 RADIATION PHYSICS CONSULT: CPT | Performed by: RADIOLOGY

## 2018-08-08 PROCEDURE — 71000015 HC POSTOP RECOV 1ST HR: Performed by: UROLOGY

## 2018-08-08 PROCEDURE — D9220A PRA ANESTHESIA: Mod: ANES,,, | Performed by: ANESTHESIOLOGY

## 2018-08-08 PROCEDURE — 71000044 HC DOSC ROUTINE RECOVERY FIRST HOUR: Performed by: UROLOGY

## 2018-08-08 PROCEDURE — 77014 CT GUIDANCE RADIATION THERAPY FIELD: CPT | Mod: TC

## 2018-08-08 PROCEDURE — 76965 ECHO GUIDANCE RADIOTHERAPY: CPT | Mod: 26,,, | Performed by: UROLOGY

## 2018-08-08 PROCEDURE — 37000008 HC ANESTHESIA 1ST 15 MINUTES: Performed by: UROLOGY

## 2018-08-08 PROCEDURE — 77778 APPLY INTERSTIT RADIAT COMPL: CPT | Mod: TC | Performed by: RADIOLOGY

## 2018-08-08 PROCEDURE — D9220A PRA ANESTHESIA: Mod: CRNA,,, | Performed by: NURSE ANESTHETIST, CERTIFIED REGISTERED

## 2018-08-08 PROCEDURE — 77263 THER RADIOLOGY TX PLNG CPLX: CPT | Mod: ,,, | Performed by: RADIOLOGY

## 2018-08-08 PROCEDURE — 36000705 HC OR TIME LEV I EA ADD 15 MIN: Performed by: UROLOGY

## 2018-08-08 PROCEDURE — 25000003 PHARM REV CODE 250: Performed by: UROLOGY

## 2018-08-08 PROCEDURE — 36000704 HC OR TIME LEV I 1ST 15 MIN: Performed by: UROLOGY

## 2018-08-08 PROCEDURE — 25000003 PHARM REV CODE 250: Performed by: NURSE ANESTHETIST, CERTIFIED REGISTERED

## 2018-08-08 PROCEDURE — 25000003 PHARM REV CODE 250: Performed by: STUDENT IN AN ORGANIZED HEALTH CARE EDUCATION/TRAINING PROGRAM

## 2018-08-08 PROCEDURE — 77014 CT GUIDANCE RADIATION THERAPY FIELD: CPT | Mod: 26,TC,, | Performed by: RADIOLOGY

## 2018-08-08 PROCEDURE — 63600175 PHARM REV CODE 636 W HCPCS: Performed by: STUDENT IN AN ORGANIZED HEALTH CARE EDUCATION/TRAINING PROGRAM

## 2018-08-08 PROCEDURE — 55875 TRANSPERI NEEDLE PLACE PROS: CPT | Mod: ,,, | Performed by: UROLOGY

## 2018-08-08 PROCEDURE — 77470 SPECIAL RADIATION TREATMENT: CPT | Mod: 26,59,, | Performed by: RADIOLOGY

## 2018-08-08 PROCEDURE — C2640 BRACHYTX, STRANDED, P-103: HCPCS | Performed by: RADIOLOGY

## 2018-08-08 PROCEDURE — 77790 RADIATION HANDLING: CPT | Mod: TC | Performed by: RADIOLOGY

## 2018-08-08 PROCEDURE — 77470 SPECIAL RADIATION TREATMENT: CPT | Mod: 59,TC | Performed by: RADIOLOGY

## 2018-08-08 PROCEDURE — 77300 RADIATION THERAPY DOSE PLAN: CPT | Mod: TC | Performed by: RADIOLOGY

## 2018-08-08 PROCEDURE — 77778 APPLY INTERSTIT RADIAT COMPL: CPT | Mod: 26,,, | Performed by: RADIOLOGY

## 2018-08-08 RX ORDER — PROPOFOL 10 MG/ML
VIAL (ML) INTRAVENOUS
Status: DISCONTINUED | OUTPATIENT
Start: 2018-08-08 | End: 2018-08-08

## 2018-08-08 RX ORDER — POLYETHYLENE GLYCOL 3350 17 G/17G
17 POWDER, FOR SOLUTION ORAL DAILY
Qty: 30 PACKET | Refills: 0 | Status: SHIPPED | OUTPATIENT
Start: 2018-08-08 | End: 2018-09-06

## 2018-08-08 RX ORDER — SUCCINYLCHOLINE CHLORIDE 20 MG/ML
INJECTION INTRAMUSCULAR; INTRAVENOUS
Status: DISCONTINUED | OUTPATIENT
Start: 2018-08-08 | End: 2018-08-08

## 2018-08-08 RX ORDER — HYDROCODONE BITARTRATE AND ACETAMINOPHEN 5; 325 MG/1; MG/1
1 TABLET ORAL EVERY 6 HOURS PRN
Qty: 11 TABLET | Refills: 0 | Status: SHIPPED | OUTPATIENT
Start: 2018-08-08 | End: 2018-08-18

## 2018-08-08 RX ORDER — NEOSTIGMINE METHYLSULFATE 1 MG/ML
INJECTION, SOLUTION INTRAVENOUS
Status: DISCONTINUED | OUTPATIENT
Start: 2018-08-08 | End: 2018-08-08

## 2018-08-08 RX ORDER — IBUPROFEN 800 MG/1
800 TABLET ORAL EVERY 6 HOURS PRN
Qty: 40 TABLET | Refills: 1 | Status: SHIPPED | OUTPATIENT
Start: 2018-08-08 | End: 2018-09-06

## 2018-08-08 RX ORDER — SODIUM CHLORIDE, SODIUM LACTATE, POTASSIUM CHLORIDE, CALCIUM CHLORIDE 600; 310; 30; 20 MG/100ML; MG/100ML; MG/100ML; MG/100ML
INJECTION, SOLUTION INTRAVENOUS CONTINUOUS PRN
Status: DISCONTINUED | OUTPATIENT
Start: 2018-08-08 | End: 2018-08-08

## 2018-08-08 RX ORDER — SODIUM CHLORIDE 0.9 % (FLUSH) 0.9 %
3 SYRINGE (ML) INJECTION
Status: DISCONTINUED | OUTPATIENT
Start: 2018-08-08 | End: 2018-08-08 | Stop reason: HOSPADM

## 2018-08-08 RX ORDER — GLYCOPYRROLATE 0.2 MG/ML
INJECTION INTRAMUSCULAR; INTRAVENOUS
Status: DISCONTINUED | OUTPATIENT
Start: 2018-08-08 | End: 2018-08-08

## 2018-08-08 RX ORDER — LORAZEPAM 2 MG/ML
0.25 INJECTION INTRAMUSCULAR ONCE AS NEEDED
Status: DISCONTINUED | OUTPATIENT
Start: 2018-08-08 | End: 2018-08-08 | Stop reason: HOSPADM

## 2018-08-08 RX ORDER — ONDANSETRON 2 MG/ML
INJECTION INTRAMUSCULAR; INTRAVENOUS
Status: DISCONTINUED | OUTPATIENT
Start: 2018-08-08 | End: 2018-08-08

## 2018-08-08 RX ORDER — METHYLPREDNISOLONE 4 MG/1
TABLET ORAL
Qty: 1 PACKAGE | Refills: 0 | Status: SHIPPED | OUTPATIENT
Start: 2018-08-08 | End: 2018-09-06

## 2018-08-08 RX ORDER — LIDOCAINE HYDROCHLORIDE 10 MG/ML
1 INJECTION, SOLUTION EPIDURAL; INFILTRATION; INTRACAUDAL; PERINEURAL ONCE
Status: COMPLETED | OUTPATIENT
Start: 2018-08-08 | End: 2018-08-08

## 2018-08-08 RX ORDER — LIDOCAINE HYDROCHLORIDE 20 MG/ML
JELLY TOPICAL
Status: DISCONTINUED | OUTPATIENT
Start: 2018-08-08 | End: 2018-08-08 | Stop reason: HOSPADM

## 2018-08-08 RX ORDER — SODIUM CHLORIDE 9 MG/ML
INJECTION, SOLUTION INTRAVENOUS CONTINUOUS
Status: DISCONTINUED | OUTPATIENT
Start: 2018-08-08 | End: 2018-08-08 | Stop reason: HOSPADM

## 2018-08-08 RX ORDER — LIDOCAINE HCL/PF 100 MG/5ML
SYRINGE (ML) INTRAVENOUS
Status: DISCONTINUED | OUTPATIENT
Start: 2018-08-08 | End: 2018-08-08

## 2018-08-08 RX ORDER — ROCURONIUM BROMIDE 10 MG/ML
INJECTION, SOLUTION INTRAVENOUS
Status: DISCONTINUED | OUTPATIENT
Start: 2018-08-08 | End: 2018-08-08

## 2018-08-08 RX ORDER — HYDROMORPHONE HYDROCHLORIDE 1 MG/ML
0.2 INJECTION, SOLUTION INTRAMUSCULAR; INTRAVENOUS; SUBCUTANEOUS EVERY 5 MIN PRN
Status: DISCONTINUED | OUTPATIENT
Start: 2018-08-08 | End: 2018-08-08 | Stop reason: HOSPADM

## 2018-08-08 RX ORDER — FENTANYL CITRATE 50 UG/ML
INJECTION, SOLUTION INTRAMUSCULAR; INTRAVENOUS
Status: DISCONTINUED | OUTPATIENT
Start: 2018-08-08 | End: 2018-08-08

## 2018-08-08 RX ADMIN — SODIUM CHLORIDE: 0.9 INJECTION, SOLUTION INTRAVENOUS at 09:08

## 2018-08-08 RX ADMIN — ROCURONIUM BROMIDE 10 MG: 10 INJECTION, SOLUTION INTRAVENOUS at 12:08

## 2018-08-08 RX ADMIN — FENTANYL CITRATE 25 MCG: 50 INJECTION, SOLUTION INTRAMUSCULAR; INTRAVENOUS at 01:08

## 2018-08-08 RX ADMIN — SODIUM CHLORIDE, SODIUM LACTATE, POTASSIUM CHLORIDE, AND CALCIUM CHLORIDE: 600; 310; 30; 20 INJECTION, SOLUTION INTRAVENOUS at 12:08

## 2018-08-08 RX ADMIN — GENTAMICIN SULFATE 240 MG: 40 INJECTION, SOLUTION INTRAMUSCULAR; INTRAVENOUS at 12:08

## 2018-08-08 RX ADMIN — FENTANYL CITRATE 50 MCG: 50 INJECTION, SOLUTION INTRAMUSCULAR; INTRAVENOUS at 12:08

## 2018-08-08 RX ADMIN — ROCURONIUM BROMIDE 10 MG: 10 INJECTION, SOLUTION INTRAVENOUS at 01:08

## 2018-08-08 RX ADMIN — NEOSTIGMINE METHYLSULFATE 4 MG: 1 INJECTION INTRAVENOUS at 01:08

## 2018-08-08 RX ADMIN — LIDOCAINE HYDROCHLORIDE 100 MG: 20 INJECTION, SOLUTION INTRAVENOUS at 12:08

## 2018-08-08 RX ADMIN — GLYCOPYRROLATE 0.6 MG: 0.2 INJECTION, SOLUTION INTRAMUSCULAR; INTRAVENOUS at 01:08

## 2018-08-08 RX ADMIN — LIDOCAINE HYDROCHLORIDE 0.01 MG: 10 INJECTION, SOLUTION EPIDURAL; INFILTRATION; INTRACAUDAL; PERINEURAL at 09:08

## 2018-08-08 RX ADMIN — LIDOCAINE HYDROCHLORIDE 10 ML: 20 JELLY TOPICAL at 12:08

## 2018-08-08 RX ADMIN — PROPOFOL 200 MG: 10 INJECTION, EMULSION INTRAVENOUS at 12:08

## 2018-08-08 RX ADMIN — ONDANSETRON 4 MG: 2 INJECTION INTRAMUSCULAR; INTRAVENOUS at 01:08

## 2018-08-08 RX ADMIN — SUCCINYLCHOLINE CHLORIDE 160 MG: 20 INJECTION, SOLUTION INTRAMUSCULAR; INTRAVENOUS at 12:08

## 2018-08-08 RX ADMIN — ROCURONIUM BROMIDE 20 MG: 10 INJECTION, SOLUTION INTRAVENOUS at 12:08

## 2018-08-08 NOTE — OP NOTE
[facility]  Operative Note    Date: 08/08/2018    Pre-Op Diagnosis: Prostate Cancer    Post-Op Diagnosis: same    Procedure(s) Performed:   1. Transrectal ultrasound of prostate  2. Ultrasound guided needle placement  3. Radioactive seed implantation in prostate  4. Fluoroscopy <1 hour    Specimen(s): none    Staff Surgeon: stanley Matat MD    Assistant Surgeon: Justo Estevez MD    Anesthesia: General endotracheal anesthesia    Indications: Janusz Montgomery Jr. is a 73 y.o. male with prostate cancer who has elected to pursue brachytherapy as treatment.     Findings:   84 seeds placed    Estimated Blood Loss: min    Drains: none    Procedure in detail:  The patient confirmed he had taken his pre-procedure antibiotics and did the bowel prep.  After risks benefits and possible complications of placement of radioactive seed placement were discussed, the patient elected to undergo the procedure and informed consent was obtained. All questions were answered in the pre-operative area. The patient was transferred to cystoscopy suite and placed in the supine position.  SCDs were applied and working.  Anesthesia was administered.  Once adequately sedated, the patient was placed in dorsal lithotomy position and prepped and draped in the usual sterile fashion. Time out was performed, adriana-procedural antibiotics were confirmed    A 16-[place] Goodson catheter was advanced into the bladder and 10 mL of sterile water was used to inflate the balloon.  The patient's bladder was emptied completely and filled with 100 mL of  sterile water.    The US probe was introduced into the patient's rectum and the secured with stabilizers in coordination with the patients previous prostate mapping.      A total of 84 preloaded seeds were placed trans perineally into the prostate in accordance to the patients previous prostate mapping under the guidance of radiation oncology, who was present throughout the procedure.      A KUB  was taken at the end of the procedure to confirm seed implantation.       Follow up care:  The patient will follow up with Dr. Matta as scheduled.  He will get a CT scan of the abdomen and pelvis before leaving today. He will be discharged home with prescriptions for antibiotics, medrol dose pack, ibuprofen 800 TID, and flomax.    Justo Estevez MD

## 2018-08-08 NOTE — OP NOTE
[facility]  Operative Note    Date: 08/08/2018    Pre-Op Diagnosis: Prostate Cancer    Post-Op Diagnosis: same    Procedure(s) Performed:   1. Transrectal ultrasound of prostate  2. Ultrasound guided needle placement  3. Radioactive seed implantation in prostate  4. Fluoroscopy <1 hour    Specimen(s): none    Staff Surgeon: Bipin Thompson MD    Assistant Surgeon: Justo Estevez MD    Anesthesia: General endotracheal anesthesia    Indications: Janusz Montgomery Jr. is a 73 y.o. male with prostate cancer who has elected to pursue brachytherapy as treatment.     Findings:   84 seeds placed    Estimated Blood Loss: min    Drains: none    Procedure in detail:  The patient confirmed he had taken his pre-procedure antibiotics and did the bowel prep.  After risks benefits and possible complications of placement of radioactive seed placement were discussed, the patient elected to undergo the procedure and informed consent was obtained. All questions were answered in the pre-operative area. The patient was transferred to cystoscopy suite and placed in the supine position.  SCDs were applied and working.  Anesthesia was administered.  Once adequately sedated, the patient was placed in dorsal lithotomy position and prepped and draped in the usual sterile fashion. Time out was performed, adriana-procedural antibiotics were confirmed    A 16-[place] Goodson catheter was advanced into the bladder and 10 mL of sterile water was used to inflate the balloon.  The patient's bladder was emptied completely and filled with 100 mL of  sterile water.    The US probe was introduced into the patient's rectum and the secured with stabilizers in coordination with the patients previous prostate mapping.      A total of 84 preloaded seeds were placed trans perineally into the prostate in accordance to the patients previous prostate mapping under the guidance of radiation oncology, who was present throughout the procedure.      A KUB was  taken at the end of the procedure to confirm seed implantation.       Follow up care:  The patient will follow up with Dr. Matta as scheduled.  He will get a CT scan of the abdomen and pelvis before leaving today. He will be discharged home with prescriptions for antibiotics, medrol dose pack, ibuprofen 800 TID, and flomax.    Justo Estevez MD

## 2018-08-08 NOTE — H&P
"Urology ([facility]) H&P  Staff: MD Jay      HPI:  Janusz Montgomery Jr. is a 73 y.o. male with prostate cancer who has elected to undergo brachytherapy.      ROS:  Neg except per HPI    Past Medical History:   Diagnosis Date    NAT (acute kidney injury) 3/19/2017    ALLERGIC RHINITIS     Anemia     Anxiety     Basal cell carcinoma     forehead    Basal cell carcinoma     left eylid    Basal cell carcinoma 08/18/2014    left prox cheek    Basal cell carcinoma 9/13/13    Right anterior shoulder    Basal cell carcinoma     Chronic rhinitis 5/3/2013    Chronic rhinitis 5/3/2013    Coronary artery disease involving native coronary artery of native heart without angina pectoris s/p RCA stent     Cortical cataract of both eyes 3/18/2016    Depression     Erectile dysfunction 3/24/2014    Erectile dysfunction 3/24/2014    Essential hypertension     GERD (gastroesophageal reflux disease) 7/25/2012    Gout, arthritis     Grade III open fracture of left tibia and fibula s/p ex-fix on 7/1/16 and ORIF of left tibia on 7/15 7/6/2016    H/O: iritis     Helicobacter pylori (H. pylori) infection     Treated    Herpes simplex keratoconjunctivitis 9/30/2015    - on acyclovir - followed by opthalmology, Dr. Uribe     Herpes simplex keratoconjunctivitis 9/30/2015    - on acyclovir - followed by opthalmology, Dr. Uribe     Hyperkalemia 2/28/2017    Hyperlipidemia     Hypogonadism male     Hypogonadism male     Mixed anxiety and depressive disorder     Morbid obesity     Obstructive sleep apnea on CPAP     CPAP    Osteoarthritis of left knee 7/25/2012    Paroxysmal atrial fibrillation 7/6/2016    Primary osteoarthritis of left knee 7/25/2012    Prominent aorta 1/25/2016    "RESULTS: THE HEART IS MILDLY ENLARGED WITH A SLIGHTLY PROMINENT AORTA" - Xray Chest PA & Lateral 12-     Prostate cancer 2/15/2016    - followed by urology, Dr. Young     Prostate cancer 2/15/2016    - followed by " urology, Dr. 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 Conteh    CATARACT EXTRACTION W/  INTRAOCULAR LENS IMPLANT Left 4/12/2016        EXTERNAL FIXATION TIBIAL FRACTURE Left 07/01/2016    ORIF TIBIA FRACTURE Left 07/15/2016    Squamous cell cancer removal x3 with Mohs surgery      TONSILLECTOMY      TOTAL KNEE ARTHROPLASTY  10/2012    trus/bx         Social History     Social History    Marital status:      Spouse name: N/A    Number of children: N/A    Years of education: N/A     Occupational History    Retired       Social History Main Topics    Smoking status: Never Smoker    Smokeless tobacco: Never Used    Alcohol use Yes      Comment: occasionally    Drug use: No    Sexual activity: Yes     Other Topics Concern    None     Social History Narrative    None       Family History   Problem Relation Age of Onset    Skin cancer Father     Lung cancer Father     Cancer Father         smoker,     Alzheimer's disease Mother     Hypertension Mother     Cancer Sister         colon, lung cancer     Cancer Brother         skin cancer, polypectomy     Peripheral vascular disease Unknown     Melanoma Neg Hx     Psoriasis Neg Hx     Lupus Neg Hx     Eczema Neg Hx     Amblyopia Neg Hx     Blindness Neg Hx     Cataracts Neg Hx     Diabetes Neg Hx     Glaucoma Neg Hx     Macular degeneration Neg Hx     Retinal detachment Neg Hx     Strabismus Neg Hx     Stroke Neg Hx      Thyroid disease Neg Hx     Acne Neg Hx        Review of patient's allergies indicates:   Allergen Reactions    Ciprofloxacin Rash     Diffuse pruritic morbilliform rash developed 3/15/2017 after dose of cipro; previously in 2/2017 he had rash/fevers after initiation of cipro    Zosyn [piperacillin-tazobactam] Anaphylaxis     Diffuse pruritic morbilliform rash developed 3/15/2017.  Then, 430am dose on 3/16 and rash worsened with SOB/tachypnea but no hypoxemia.     Bacitracin Itching and Rash     Violaceous rash in area of topical Tx.        No current facility-administered medications on file prior to encounter.      Current Outpatient Prescriptions on File Prior to Encounter   Medication Sig Dispense Refill    acyclovir (ZOVIRAX) 800 MG Tab Take 1 tablet (800 mg total) by mouth 2 (two) times daily. 180 tablet 6    apixaban 5 mg Tab Take 1 tablet (5 mg total) by mouth 2 (two) times daily. 60 tablet 11    artificial tears (ISOPTO TEARS) 0.5 % ophthalmic solution Place 1 drop into both eyes as needed. (Patient taking differently: Place 1 drop into both eyes as needed (for dry eyes). )      aspirin (ECOTRIN) 81 MG EC tablet Take 1 tablet (81 mg total) by mouth once daily.  0    atorvastatin (LIPITOR) 40 MG tablet Take 1 tablet (40 mg total) by mouth once daily. 90 tablet 0    CYANOCOBALAMIN, VITAMIN B-12, (VITAMIN B-12 ORAL) Take 2,500 mcg by mouth once daily.      gabapentin (NEURONTIN) 300 MG capsule TAKE ONE CAPSULE BY MOUTH THREE TIMES DAILY 90 capsule 11    melatonin 3 mg Tab Take 6 mg by mouth nightly as needed (for sleep).       omeprazole (PRILOSEC) 20 MG capsule Take 1 capsule (20 mg total) by mouth daily as needed. 90 capsule 0    prednisoLONE acetate (PRED FORTE) 1 % DrpS Place 1 drop into the right eye 4 (four) times daily. 1 Bottle 2    ramipril (ALTACE) 5 MG capsule Take 1 capsule (5 mg total) by mouth once daily. 90 capsule 1    tamsulosin (FLOMAX) 0.4 mg Cp24 TAKE ONE CAPSULE BY MOUTH  ONCE DAILY 90 capsule 3    venlafaxine (EFFEXOR) 75 MG tablet Take 2 tablets (150 mg total) by mouth 2 (two) times daily. 360 tablet 1    vitamin D 1000 units Tab Take 1 tablet (1,000 Units total) by mouth once daily.      zinc sulfate (ZINCATE) 220 (50) mg capsule Take 1 capsule (220 mg total) by mouth once daily.      furosemide (LASIX) 20 MG tablet Take 1 tablet (20 mg total) by mouth once daily. 90 tablet 0    multivitamin (MULTIPLE VITAMINS) per tablet Take 1 tablet by mouth once daily.      sildenafil (VIAGRA) 100 MG tablet Take 1 tablet (100 mg total) by mouth daily as needed for Erectile Dysfunction. 10 tablet 11       Anticoagulation:  Apixaban, aspirin. Stopped both 5 days prior    Physical Exam:      AAOx4, NAD, WDWN  NC/AT, EOMI, PER, sclerae anicteric, speech normal, tongue midline  Nl effort, CTAB  RRR  Soft, non-tender, non-distended      Labs:    Urine dipstick today - negative for all components    Lab Results   Component Value Date    WBC 10.25 12/12/2017    HGB 12.7 (L) 12/12/2017    HCT 38.3 (L) 12/12/2017     (H) 12/12/2017     12/12/2017       BMP  Lab Results   Component Value Date     07/26/2018    K 4.5 07/26/2018     07/26/2018    CO2 25 07/26/2018    BUN 16 07/26/2018    CREATININE 1.2 07/26/2018    CALCIUM 9.3 07/26/2018    ANIONGAP 7 (L) 07/26/2018    ESTGFRAFRICA >60.0 07/26/2018    EGFRNONAA >60.0 07/26/2018       Lab Results   Component Value Date    PSA 7.0 (H) 04/28/2017    PSA 3.8 03/31/2015    PSA 3.1 03/03/2014         Assessment: Janusz Montgomery Jr. is a 73 y.o. male with prostate cancer    Plan:     1. To OR for braachytherapy  2. Consents signed   3. I have explained the risk, benefits, and alternatives of the procedure in detail. The patient voices understanding and all questions have been answered. The patient agrees to proceed as planned.       Justo Estevez MD

## 2018-08-08 NOTE — DISCHARGE SUMMARY
[facility]  Discharge Note  Short Stay    Admit Date: 8/8/2018    Discharge Date and Time: 08/08/2018 3:18 PM      Attending Physician: Bipin Thompson MD    Discharge Provider: Justo Estevez    Diagnoses:  Active Hospital Problems    Diagnosis  POA    *Prostate cancer [C61]  Yes     - followed by urology, Dr. Young        Resolved Hospital Problems    Diagnosis Date Resolved POA   No resolved problems to display.       Discharged Condition: good    Hospital Course: Patient was admitted for brachytherapy and tolerated the procedure well with no complications. The patient was discharged home in good condition on the same day.       Final Diagnoses: Same as principal problem.    Disposition: Home or Self Care    Follow up/Patient Instructions:    Medications:  Reconciled Home Medications: Discharge Medication List as of 8/8/2018  2:30 PM      START taking these medications    Details   HYDROcodone-acetaminophen (NORCO) 5-325 mg per tablet Take 1 tablet by mouth every 6 (six) hours as needed for Pain., Starting Wed 8/8/2018, Until Sat 8/18/2018, Print      ibuprofen (ADVIL,MOTRIN) 800 MG tablet Take 1 tablet (800 mg total) by mouth every 6 (six) hours as needed for Pain (pain and inflammation)., Starting Wed 8/8/2018, Print      methylPREDNISolone (MEDROL DOSEPACK) 4 mg tablet use as directed, Print      polyethylene glycol (GLYCOLAX) 17 gram PwPk Take 17 g by mouth once daily., Starting Wed 8/8/2018, Print         CONTINUE these medications which have NOT CHANGED    Details   acyclovir (ZOVIRAX) 800 MG Tab Take 1 tablet (800 mg total) by mouth 2 (two) times daily., Starting Wed 3/28/2018, Normal      apixaban 5 mg Tab Take 1 tablet (5 mg total) by mouth 2 (two) times daily., Starting Wed 1/17/2018, Normal      artificial tears (ISOPTO TEARS) 0.5 % ophthalmic solution Place 1 drop into both eyes as needed., Starting 1/23/2017, Until Discontinued, OTC      aspirin (ECOTRIN) 81 MG EC tablet Take 1 tablet (81 mg  total) by mouth once daily., Starting Wed 1/17/2018, Until Thu 1/17/2019, OTC      atorvastatin (LIPITOR) 40 MG tablet Take 1 tablet (40 mg total) by mouth once daily., Starting Thu 5/10/2018, Normal      CYANOCOBALAMIN, VITAMIN B-12, (VITAMIN B-12 ORAL) Take 2,500 mcg by mouth once daily., Until Discontinued, Historical Med      fluticasone (FLONASE) 50 mcg/actuation nasal spray 2 sprays (100 mcg total) by Each Nare route once daily., Starting Mon 6/25/2018, Normal      furosemide (LASIX) 20 MG tablet Take 1 tablet (20 mg total) by mouth once daily., Starting Wed 2/7/2018, Until Thu 2/7/2019, Normal      gabapentin (NEURONTIN) 300 MG capsule TAKE ONE CAPSULE BY MOUTH THREE TIMES DAILY, Normal      melatonin 3 mg Tab Take 6 mg by mouth nightly as needed (for sleep). , Until Discontinued, Historical Med      multivitamin (MULTIPLE VITAMINS) per tablet Take 1 tablet by mouth once daily., Historical Med      omeprazole (PRILOSEC) 20 MG capsule Take 1 capsule (20 mg total) by mouth daily as needed., Starting Wed 2/7/2018, Until Thu 2/7/2019, Normal      ramipril (ALTACE) 5 MG capsule Take 1 capsule (5 mg total) by mouth once daily., Starting Tue 3/27/2018, Normal      sildenafil (VIAGRA) 100 MG tablet Take 1 tablet (100 mg total) by mouth daily as needed for Erectile Dysfunction., Starting Tue 3/27/2018, Normal      tamsulosin (FLOMAX) 0.4 mg Cp24 TAKE ONE CAPSULE BY MOUTH ONCE DAILY, Normal      venlafaxine (EFFEXOR) 75 MG tablet Take 2 tablets (150 mg total) by mouth 2 (two) times daily., Starting Wed 2/7/2018, Normal      vitamin D 1000 units Tab Take 1 tablet (1,000 Units total) by mouth once daily., Starting 8/5/2016, Until Discontinued, OTC      zinc sulfate (ZINCATE) 220 (50) mg capsule Take 1 capsule (220 mg total) by mouth once daily., Starting 1/23/2017, Until Discontinued, OTC         STOP taking these medications       amoxicillin (AMOXIL) 875 MG tablet Comments:   Reason for Stopping:         prednisoLONE  acetate (PRED FORTE) 1 % DrpS Comments:   Reason for Stopping:               Discharge Procedure Orders  [facility]   Standing Status: Future Number of Occurrences: 1 Standing Exp. Date: 08/08/19   Order Specific Question Answer Comments   May the Radiologist modify the order per protocol to meet the clinical needs of the patient? Yes      Call MD for:  persistent nausea and vomiting or diarrhea     Call MD for:  severe uncontrolled pain     Call MD for:  persistent dizziness, light-headedness, or visual disturbances     Call MD for:   Order Comments: Temperature > 101F       Follow-up Information     Sotero Matta Jr, MD.    Specialty:  Radiation Oncology  Why:  for previously schedueld post op visit  Contact information:  [facility]  [place] [id number] [phone]                   Discharge Procedure Orders (must include Diet, Follow-up, Activity):    Discharge Procedure Orders (must include Diet, Follow-up, Activity)  [facility] Radiation Therapy [facility]   Standing Status: Future Number of Occurrences: 1 Standing Exp. Date: 08/08/19   Order Specific Question Answer Comments   May the Radiologist modify the order per protocol to meet the clinical needs of the patient? Yes      Call MD for:  persistent nausea and vomiting or diarrhea     Call MD for:  severe uncontrolled pain     Call MD for:  persistent dizziness, light-headedness, or visual disturbances     Call MD for:   Order Comments: Temperature > 101F

## 2018-08-08 NOTE — ANESTHESIA POSTPROCEDURE EVALUATION
"Anesthesia Post Evaluation    Patient: Janusz Montgomery Jr.    Procedure(s) Performed: Procedure(s) (LRB):  INSERTION, RADIOACTIVE SEED, PROSTATE (N/A)    Final Anesthesia Type: general  Patient location during evaluation: PACU  Patient participation: Yes- Able to Participate  Level of consciousness: awake and alert  Post-procedure vital signs: reviewed and stable  Pain management: adequate  Airway patency: patent  PONV status at discharge: No PONV  Anesthetic complications: no      Cardiovascular status: blood pressure returned to baseline  Respiratory status: unassisted  Hydration status: euvolemic  Follow-up not needed.        Visit Vitals  BP (!) 149/67   Pulse 87   Temp 36.9 °C (98.4 °F) (Temporal)   Resp 16   Ht 6' 1" (1.854 m)   Wt (!) 164.2 kg (361 lb 15.9 oz)   SpO2 96%   BMI 47.76 kg/m²       Pain/Serjio Score: Pain Assessment Performed: Yes (8/8/2018  3:00 PM)  Presence of Pain: denies (8/8/2018  3:00 PM)  Serjio Score: 10 (8/8/2018  3:00 PM)  Modified Serjio Score: 20 (8/8/2018  3:00 PM)      "

## 2018-08-08 NOTE — TRANSFER OF CARE
"Anesthesia Transfer of Care Note    Patient: Janusz Montgomery Jr.    Procedure(s) Performed: Procedure(s) (LRB):  INSERTION, RADIOACTIVE SEED, PROSTATE (N/A)    Patient location: PACU    Anesthesia Type: general    Transport from OR: Transported from OR on 6-10 L/min O2 by face mask with adequate spontaneous ventilation    Post pain: adequate analgesia    Post assessment: no apparent anesthetic complications    Post vital signs: stable    Level of consciousness: awake, alert and oriented    Nausea/Vomiting: no nausea/vomiting    Complications: none    Transfer of care protocol was followed      Last vitals:   Visit Vitals  BP (!) 147/63 (Patient Position: Lying)   Pulse 76   Temp 36.7 °C (98.1 °F) (Temporal)   Resp (!) 21   Ht 6' 1" (1.854 m)   Wt (!) 164.2 kg (361 lb 15.9 oz)   SpO2 95%   BMI 47.76 kg/m²     "

## 2018-08-09 PROCEDURE — 77318 BRACHYTX ISODOSE COMPLEX: CPT | Mod: 26,,, | Performed by: RADIOLOGY

## 2018-08-09 PROCEDURE — 77318 BRACHYTX ISODOSE COMPLEX: CPT | Mod: TC | Performed by: RADIOLOGY

## 2018-08-10 RX ORDER — ATORVASTATIN CALCIUM 40 MG/1
TABLET, FILM COATED ORAL
Qty: 90 TABLET | Refills: 0 | Status: SHIPPED | OUTPATIENT
Start: 2018-08-10 | End: 2018-11-15 | Stop reason: SDUPTHER

## 2018-08-14 ENCOUNTER — CLINICAL SUPPORT (OUTPATIENT)
Dept: ELECTROPHYSIOLOGY | Facility: CLINIC | Age: 73
End: 2018-08-14
Attending: INTERNAL MEDICINE
Payer: MEDICARE

## 2018-08-14 ENCOUNTER — TELEPHONE (OUTPATIENT)
Dept: FAMILY MEDICINE | Facility: CLINIC | Age: 73
End: 2018-08-14

## 2018-08-14 DIAGNOSIS — I48.0 PAF (PAROXYSMAL ATRIAL FIBRILLATION): ICD-10-CM

## 2018-08-14 DIAGNOSIS — Z95.818 STATUS POST PLACEMENT OF IMPLANTABLE LOOP RECORDER: ICD-10-CM

## 2018-08-14 PROCEDURE — 93299 LOOP RECORDER REMOTE: CPT

## 2018-08-14 PROCEDURE — 93298 REM INTERROG DEV EVAL SCRMS: CPT | Mod: ,,, | Performed by: INTERNAL MEDICINE

## 2018-08-14 NOTE — TELEPHONE ENCOUNTER
----- Message from Jamila Kay sent at 8/13/2018  4:36 PM CDT -----  Contact: José Miguel [phone]  Patient is returning a call back from the staff, please call at your earliest convenience.

## 2018-08-16 ENCOUNTER — CLINICAL SUPPORT (OUTPATIENT)
Dept: AUDIOLOGY | Facility: CLINIC | Age: 73
End: 2018-08-16
Payer: MEDICARE

## 2018-08-16 ENCOUNTER — OFFICE VISIT (OUTPATIENT)
Dept: OTOLARYNGOLOGY | Facility: CLINIC | Age: 73
End: 2018-08-16
Payer: MEDICARE

## 2018-08-16 VITALS — TEMPERATURE: 98 F | SYSTOLIC BLOOD PRESSURE: 136 MMHG | HEART RATE: 92 BPM | DIASTOLIC BLOOD PRESSURE: 62 MMHG

## 2018-08-16 DIAGNOSIS — Z86.69 HISTORY OF HEARING LOSS: ICD-10-CM

## 2018-08-16 DIAGNOSIS — H61.22 IMPACTED CERUMEN, LEFT EAR: ICD-10-CM

## 2018-08-16 DIAGNOSIS — J32.9 RECURRENT SINUS INFECTIONS: Primary | ICD-10-CM

## 2018-08-16 DIAGNOSIS — R42 DIZZINESS: ICD-10-CM

## 2018-08-16 DIAGNOSIS — H61.21 IMPACTED CERUMEN, RIGHT EAR: ICD-10-CM

## 2018-08-16 DIAGNOSIS — H92.09 EAR DISCOMFORT, UNSPECIFIED LATERALITY: ICD-10-CM

## 2018-08-16 DIAGNOSIS — H90.3 SENSORINEURAL HEARING LOSS, BILATERAL: Primary | ICD-10-CM

## 2018-08-16 DIAGNOSIS — Z97.4 WEARS HEARING AID IN BOTH EARS: ICD-10-CM

## 2018-08-16 PROCEDURE — 3078F DIAST BP <80 MM HG: CPT | Mod: CPTII,S$GLB,, | Performed by: OTOLARYNGOLOGY

## 2018-08-16 PROCEDURE — 99999 PR PBB SHADOW E&M-EST. PATIENT-LVL I: CPT | Mod: PBBFAC,,,

## 2018-08-16 PROCEDURE — 99999 PR PBB SHADOW E&M-EST. PATIENT-LVL III: CPT | Mod: PBBFAC,,, | Performed by: OTOLARYNGOLOGY

## 2018-08-16 PROCEDURE — 99213 OFFICE O/P EST LOW 20 MIN: CPT | Mod: 25,S$GLB,, | Performed by: OTOLARYNGOLOGY

## 2018-08-16 PROCEDURE — 92567 TYMPANOMETRY: CPT | Mod: S$GLB,,, | Performed by: AUDIOLOGIST

## 2018-08-16 PROCEDURE — G0268 REMOVAL OF IMPACTED WAX MD: HCPCS | Mod: S$GLB,,, | Performed by: OTOLARYNGOLOGY

## 2018-08-16 PROCEDURE — 92557 COMPREHENSIVE HEARING TEST: CPT | Mod: S$GLB,,, | Performed by: AUDIOLOGIST

## 2018-08-16 PROCEDURE — 3075F SYST BP GE 130 - 139MM HG: CPT | Mod: CPTII,S$GLB,, | Performed by: OTOLARYNGOLOGY

## 2018-08-16 NOTE — PATIENT INSTRUCTIONS
Dry C.I. partially extracted from deep AS eac; AD eac irrigated  Dropper supplied for instillation of white vinegar into ear canals h.s  prn  Audiometry/tympanometry reviewed:  ear pressures WNL  Consider VNG for jose vertigo sx; vertigo literature provided  Yearly ear cleaning + audiometric monitoring encourged  Discontinue placing rolled up newspaper into ear canals
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
## 2018-08-16 NOTE — PROGRESS NOTES
Mr. Janusz Montgomery was referred by Dr. Knutson for an audiological evaluation today.      Audiological testing revealed a mild to profound sensorineural hearing loss, bilaterally.  A speech reception threshold was obtained at 30 dBHL for the right ear and at 25 dBHL for the left ear.  Speech discrimination was 52% for the right ear and 72% for the left ear.      Tympanometry testing revealed a Type A tympanogram for the right ear and a shallow tympanogram for the left ear.     Recommendations:  1. Otologic evaluation  2. Annual hearing evaluation  3. Hearing protection when in noise   4. Continue use of binaural amplification

## 2018-08-20 ENCOUNTER — PATIENT MESSAGE (OUTPATIENT)
Dept: RADIATION ONCOLOGY | Facility: CLINIC | Age: 73
End: 2018-08-20

## 2018-08-20 ENCOUNTER — TELEPHONE (OUTPATIENT)
Dept: RADIATION ONCOLOGY | Facility: CLINIC | Age: 73
End: 2018-08-20

## 2018-08-20 NOTE — TELEPHONE ENCOUNTER
"Phone review s/p brachytherapy. Pt states he is doing fine. No dysuria or hematuria. Nocturia x 2-3. Bowel movements are "back to normal regular - every 3-4 days" per client. Mr. HATFIELD voiced he has completed his medication received at discharge. Coordinated follow up appointment.   "

## 2018-08-30 ENCOUNTER — PATIENT MESSAGE (OUTPATIENT)
Dept: RADIATION ONCOLOGY | Facility: CLINIC | Age: 73
End: 2018-08-30

## 2018-08-31 ENCOUNTER — TELEPHONE (OUTPATIENT)
Dept: ELECTROPHYSIOLOGY | Facility: CLINIC | Age: 73
End: 2018-08-31

## 2018-08-31 NOTE — TELEPHONE ENCOUNTER
Patient wanted to know if any events showed up on his loop recorder remote transmission on 08/18/2018. I informed him that no alerts were noted on 08/18/2018. Patient stated understanding.   ----- Message from Daria Brooks MA sent at 8/31/2018  3:54 PM CDT -----  Contact: patient called  Please call the patient at [phone] he need to talk to someone about his device. Thank you.

## 2018-09-04 ENCOUNTER — CLINICAL SUPPORT (OUTPATIENT)
Dept: AUDIOLOGY | Facility: CLINIC | Age: 73
End: 2018-09-04

## 2018-09-04 DIAGNOSIS — H90.3 SENSORINEURAL HEARING LOSS, BILATERAL: Primary | ICD-10-CM

## 2018-09-04 PROCEDURE — 99499 UNLISTED E&M SERVICE: CPT | Mod: S$GLB,,, | Performed by: OTOLARYNGOLOGY

## 2018-09-04 NOTE — PROGRESS NOTES
Janusz Montgomery was seen in the clinic today for an annual hearing aid follow-up. He was accompanied by his wife.    The hearing aids were reprogrammed based on the results of Mr. Montgomery's hearing test from 8/14/2018. Acclimatization was increased to 2. Complex noise reduction was increased to -9. The firmware was updated in both hearing aids. Mr. Bertrand reported he was having some intermittent charging issues with the hearing aids. I will order his two replacement battery doors and call him to pick them up when we receive them. Care and maintenance were reviewed.    Mr. Montgomery was encouraged to call the clinic for a follow-up appointment as needed. A one year reminder was put in to the Epic system.    Hearing Aid Information:    Right ear  : [employer]  Model: OPN 1  Type: ANTHONY  Color: Silver grey  Battery: Rechargeable  Tube/ length & power: #3 100  Dome size & style: 8mm power  Serial number: [id number]   Warranty expiration: 12/15/2020  L and D expiration: 12/15/2020       Left ear  :    Model:    Type:    Color:    Battery:  Tube/ length & power: #3 85    Dome size & style: 10mm bass double    Serial number: [id number]  Warranty expiration:    L and D expiration:

## 2018-09-06 ENCOUNTER — TELEPHONE (OUTPATIENT)
Dept: AUDIOLOGY | Facility: CLINIC | Age: 73
End: 2018-09-06

## 2018-09-06 ENCOUNTER — OFFICE VISIT (OUTPATIENT)
Dept: RADIATION ONCOLOGY | Facility: CLINIC | Age: 73
End: 2018-09-06
Payer: MEDICARE

## 2018-09-06 VITALS
HEART RATE: 79 BPM | BODY MASS INDEX: 41.75 KG/M2 | DIASTOLIC BLOOD PRESSURE: 64 MMHG | WEIGHT: 315 LBS | RESPIRATION RATE: 18 BRPM | HEIGHT: 73 IN | SYSTOLIC BLOOD PRESSURE: 142 MMHG

## 2018-09-06 DIAGNOSIS — C61 PROSTATE CANCER: Primary | ICD-10-CM

## 2018-09-06 PROCEDURE — 99213 OFFICE O/P EST LOW 20 MIN: CPT | Mod: PBBFAC | Performed by: RADIOLOGY

## 2018-09-06 PROCEDURE — 99999 PR PBB SHADOW E&M-EST. PATIENT-LVL III: CPT | Mod: PBBFAC,,, | Performed by: RADIOLOGY

## 2018-09-06 PROCEDURE — 99499 UNLISTED E&M SERVICE: CPT | Mod: S$PBB,,, | Performed by: RADIOLOGY

## 2018-09-06 RX ORDER — METHYLPREDNISOLONE 4 MG/1
TABLET ORAL
Qty: 1 PACKAGE | Refills: 1 | Status: SHIPPED | OUTPATIENT
Start: 2018-09-06 | End: 2018-09-26
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
## 2018-09-11 ENCOUNTER — TELEPHONE (OUTPATIENT)
Dept: AUDIOLOGY | Facility: CLINIC | Age: 73
End: 2018-09-11

## 2018-09-13 ENCOUNTER — OFFICE VISIT (OUTPATIENT)
Dept: PODIATRY | Facility: CLINIC | Age: 73
End: 2018-09-13
Payer: MEDICARE

## 2018-09-13 ENCOUNTER — DOCUMENTATION ONLY (OUTPATIENT)
Dept: AUDIOLOGY | Facility: CLINIC | Age: 73
End: 2018-09-13

## 2018-09-13 VITALS — WEIGHT: 315 LBS | BODY MASS INDEX: 41.75 KG/M2 | HEIGHT: 73 IN

## 2018-09-13 DIAGNOSIS — L90.9 PLANTAR FAT PAD ATROPHY: ICD-10-CM

## 2018-09-13 DIAGNOSIS — L84 PRE-ULCERATIVE CALLUSES: ICD-10-CM

## 2018-09-13 DIAGNOSIS — Z87.2 HEALED ULCER OF LEFT FOOT ON EXAMINATION: ICD-10-CM

## 2018-09-13 DIAGNOSIS — I73.9 PVD (PERIPHERAL VASCULAR DISEASE): Primary | ICD-10-CM

## 2018-09-13 PROCEDURE — 99213 OFFICE O/P EST LOW 20 MIN: CPT | Mod: S$PBB,,, | Performed by: PODIATRIST

## 2018-09-13 PROCEDURE — 99213 OFFICE O/P EST LOW 20 MIN: CPT | Mod: PBBFAC,PO | Performed by: PODIATRIST

## 2018-09-13 PROCEDURE — 99999 PR PBB SHADOW E&M-EST. PATIENT-LVL III: CPT | Mod: PBBFAC,,, | Performed by: PODIATRIST

## 2018-09-13 PROCEDURE — 99499 UNLISTED E&M SERVICE: CPT | Mod: HCNC,S$GLB,, | Performed by: PODIATRIST

## 2018-09-13 PROCEDURE — 1101F PT FALLS ASSESS-DOCD LE1/YR: CPT | Mod: CPTII,,, | Performed by: PODIATRIST

## 2018-09-13 NOTE — PROGRESS NOTES
Oticon Opn 1 miniRITE   Rechargeable Battery  Lt SN [id number]  Rt SN [id number]  Speaker: 3/85  Warranty Exp 12/15/20

## 2018-09-13 NOTE — PROGRESS NOTES
Subjective:      Patient ID: Janusz Montgomery Jr. is a 73 y.o. male.    Chief Complaint: Foot Problem (back of [facility] heal PCp Dr. Weathers  )    Janusz Montgomery Jr. is a 73 y.o. male returns to clinic for follow up of left foot ulcers.  Patient's wife has been inspecting foot daily.  He relates callus formation where wound was present and concern on if ulceration may be present beneath callus.  He relates he took a long drive and began to have pain to the heel and some darkness to the lesion site    Shoe gear:  Tennis shoes    Chief Complaint   Patient presents with    Foot Problem     back of [facility] PCp Dr. Weathers         Hemoglobin A1C   Date Value Ref Range Status   07/11/2016 5.3 4.5 - 6.2 % Final     Comment:     According to ADA guidelines, hemoglobin A1C <7.0% represents  optimal control in non-pregnant diabetic patients.  Different  metrics may apply to specific populations.   Standards of Medical Care in Diabetes - 2016.  For the purpose of screening for the presence of diabetes:  <5.7%     Consistent with the absence of diabetes  5.7-6.4%  Consistent with increasing risk for diabetes   (prediabetes)  >or=6.5%  Consistent with diabetes  Currently no consensus exists for use of hemoglobin A1C  for diagnosis of diabetes for children.     03/31/2015 5.3 4.5 - 6.2 % Final   03/03/2014 5.6 4.5 - 6.2 % Final         Current Outpatient Medications on File Prior to Visit   Medication Sig Dispense Refill    acyclovir (ZOVIRAX) 800 MG Tab Take 1 tablet (800 mg total) by mouth 2 (two) times daily. 180 tablet 6    apixaban 5 mg Tab Take 1 tablet (5 mg total) by mouth 2 (two) times daily. 60 tablet 11    artificial tears (ISOPTO TEARS) 0.5 % ophthalmic solution Place 1 drop into both eyes as needed. (Patient taking differently: Place 1 drop into both eyes as needed (for dry eyes). )      aspirin (ECOTRIN) 81 MG EC tablet Take 1 tablet (81 mg total) by mouth once daily.  0    atorvastatin (LIPITOR) 40 MG tablet  TAKE 1 TABLET BY MOUTH ONCE DAILY 90 tablet 0    CYANOCOBALAMIN, VITAMIN B-12, (VITAMIN B-12 ORAL) Take 2,500 mcg by mouth once daily.      fluticasone (FLONASE) 50 mcg/actuation nasal spray 2 sprays (100 mcg total) by Each Nare route once daily. 1 Bottle 2    furosemide (LASIX) 20 MG tablet Take 1 tablet (20 mg total) by mouth once daily. 90 tablet 0    gabapentin (NEURONTIN) 300 MG capsule TAKE ONE CAPSULE BY MOUTH THREE TIMES DAILY 90 capsule 11    melatonin 3 mg Tab Take 6 mg by mouth nightly as needed (for sleep).       methylPREDNISolone (MEDROL DOSEPACK) 4 mg tablet use as directed 1 Package 1    multivitamin (MULTIPLE VITAMINS) per tablet Take 1 tablet by mouth once daily.      omeprazole (PRILOSEC) 20 MG capsule Take 1 capsule (20 mg total) by mouth daily as needed. 90 capsule 0    ramipril (ALTACE) 5 MG capsule Take 1 capsule (5 mg total) by mouth once daily. 90 capsule 1    sildenafil (VIAGRA) 100 MG tablet Take 1 tablet (100 mg total) by mouth daily as needed for Erectile Dysfunction. 10 tablet 11    tamsulosin (FLOMAX) 0.4 mg Cp24 TAKE ONE CAPSULE BY MOUTH ONCE DAILY 90 capsule 3    venlafaxine (EFFEXOR) 75 MG tablet Take 2 tablets (150 mg total) by mouth 2 (two) times daily. 360 tablet 1    vitamin D 1000 units Tab Take 1 tablet (1,000 Units total) by mouth once daily.      zinc sulfate (ZINCATE) 220 (50) mg capsule Take 1 capsule (220 mg total) by mouth once daily.       No current facility-administered medications on file prior to visit.        Allergies   Allergen Reactions    Ciprofloxacin Rash     Diffuse pruritic morbilliform rash developed 3/15/2017 after dose of cipro; previously in 2/2017 he had rash/fevers after initiation of cipro    Zosyn [Piperacillin-Tazobactam] Anaphylaxis     Diffuse pruritic morbilliform rash developed 3/15/2017.  Then, 430am dose on 3/16 and rash worsened with SOB/tachypnea but no hypoxemia.     Bacitracin Itching and Rash     Violaceous rash in area  of topical Tx.        Past Surgical History:   Procedure Laterality Date    APPLICATION-EXTERNAL FIXATION DEVICE, ankle Left 12/28/2016    Performed by Walter Cortés [facility] at [facility]    Cardiac stenting x2      CATARACT EXTRACTION W/  INTRAOCULAR LENS IMPLANT Right 3/29/2016    Dr. Conteh    CATARACT EXTRACTION W/  INTRAOCULAR LENS IMPLANT Left 4/12/2016        DEBRIDEMENT-FOOT Left 4/13/2017    Performed by Walter [facility] at [facility]    EXTERNAL FIXATION TIBIAL FRACTURE Left 07/01/2016    FULL THICKNESS SKIN GRAFT from left shoulder Left 12/8/2015    Performed by Colt [facility]osa [facility] at [facility]    INSERTION, RADIOACTIVE SEED, PROSTATE N/A 8/8/2018    [facility]    INSERTION-INTRAOCULAR LENS (IOL) Left 4/12/2016    [facility]    INSERTION-INTRAOCULAR LENS (IOL) Right 3/29/2016    [facility]    MAPPING-ULTRASOUND  6/13/2018    [facility]    OPEN REDUCTION INTERNAL AWIUIDRV-ALPMOU-ZUDNS/FIBULA Left 4/13/2017    Performed by Walter [facility] at [facility]    OPEN REDUCTION INTERNAL FIXATION-TIBIA Left 7/15/2016    Performed by [facility] at [facility]    ORIF TIBIA FRACTURE Left 07/15/2016    PHACOEMULSIFICATION-ASPIRATION-CATARACT Left 4/12/2016    Performed by Pool Conteh MD at [facility]    PHACOEMULSIFICATION-ASPIRATION-CATARACT Right 3/29/2016    [facility]    RADIOACTIVE SEED IMPLANTATION OF PROSTATE N/A 8/8/2018    Procedure: INSERTION, RADIOACTIVE SEED, PROSTATE;  Surgeon: Bipin Thompson MD;  Location: [facility] OR [address] [place] [address];  Service: Urology;  Laterality: N/A;  1 hour    REDUCTION-CLOSED-TIBIA Left 12/28/2016    Performed by Walter [facility] at [facility]    REMOVAL OF EXTERNAL  FIXATION DEVICE Left 7/15/2016    [facility]    REMOVAL OF EXTERNAL FIXATION DEVICE, LEFT ANKLE C ARM Left 4/13/2017    Per[facility] at [facility]    REMOVAL-HARDWARE-LEG Left 4/13/2017    Per[facility] at [facility]    REPAIR-MOHS DEFECT Basal Cell Left Lateral Eyebrow Left 12/8/2015    [facility]t [facility] at [facility]    Squamous cell cancer removal x3 with Mohs surgery      TONSILLECTOMY      TOTAL KNEE ARTHROPLASTY  10/2012    trus/bx         Family History   Problem Relation Age of Onset    Skin cancer Father     Lung cancer Father     Cancer Father         smoker,     Alzheimer's disease Mother     Hypertension Mother     Cancer Sister         colon, lung cancer     Cancer Brother         skin cancer, polypectomy     Peripheral vascular disease Unknown     Melanoma Neg Hx     Psoriasis Neg Hx     Lupus Neg Hx     Eczema Neg Hx     Amblyopia Neg Hx     Blindness Neg Hx     Cataracts Neg Hx     Diabetes Neg Hx     Glaucoma Neg Hx     Macular degeneration Neg Hx     Retinal detachment Neg Hx     Strabismus Neg Hx     Stroke Neg Hx     Thyroid disease Neg Hx     Acne Neg Hx        Social History     Socioeconomic History    Marital status:      Spouse name: Not on file    Number of children: Not on file    Years of education: Not on file    Highest education level: Not on file   Social Needs    Financial resource strain: Not on file    Food insecurity - worry: Not on file    Food insecurity - inability: Not on file    Transportation needs - medical: Not on file    Transportation needs - non-medical: Not on file   Occupational History    Occupation: Retired    Tobacco Use    Smoking status: Never Smoker    Smokeless tobacco: Never Used   Substance and Sexual Activity    Alcohol use: Yes     Comment: occasionally    Drug use: No    Sexual activity:  "Yes   Other Topics Concern    Not on file   Social History Narrative    Not on file       Review of Systems   Constitution: Negative for chills, fever and weakness.   Cardiovascular: Negative for claudication and leg swelling.   Respiratory: Negative for cough and shortness of breath.    Skin: Positive for dry skin. Negative for itching, nail changes and rash.   Musculoskeletal: Positive for arthritis, joint pain and myalgias. Negative for falls, joint swelling and muscle weakness.   Gastrointestinal: Negative for diarrhea, nausea and vomiting.   Neurological: Positive for paresthesias. Negative for numbness and tremors.   Psychiatric/Behavioral: Negative for altered mental status and hallucinations.         Objective:       Vitals:    09/13/18 1611   Weight: (!) 163.3 kg (360 lb)   Height: 6' 1" (1.854 m)   PainSc:   3   PainLoc: Foot       Physical Exam   Constitutional:  Non-toxic appearance. He does not have a sickly appearance. No distress.   Pt. is well-developed, well-nourished, appears stated age, in no acute distress, alert and oriented x 3. No evidence of depression, anxiety, or agitation. Calm, cooperative, and communicative. Appropriate interactions and affect.   Cardiovascular:   Pulses:       Dorsalis pedis pulses are 2+ on the right side, and 2+ on the left side.        Posterior tibial pulses are 1+ on the right side, and 1+ on the left side.        Pulmonary/Chest: No respiratory distress.   Musculoskeletal:        Right ankle: He exhibits swelling. No tenderness. No lateral malleolus, no medial malleolus, no AITFL, no CF ligament, no head of 5th metatarsal and no proximal fibula tenderness found. Achilles tendon exhibits no pain, no defect and normal Zabala's test results.        Left ankle: He exhibits swelling. No tenderness. No lateral malleolus, no medial malleolus, no AITFL, no CF ligament and no posterior TFL tenderness found. Achilles tendon exhibits no pain, no defect and normal " Zabala's test results.        Right foot: There is no tenderness and no bony tenderness.        Left foot: There is no tenderness and no bony tenderness.   Decreased stride, station of gait.  apropulsive toe off.  Increased angle and base of gait.    Patient has hammertoes of digits 2-5 bilateral partially reducible without symptom today.     There is equinus deformity bilateral. There is limitation of dorsiflexion with knees extended and with knees flexed.    Shoes reveals lateral heel counter wear bilateral in athletic shoes.        Lymphadenopathy:   No lymphatic streaking    Negative lymphadenopathy bilateral popliteal fossa and tarsal tunnel.     Neurological:   Ione-Jenn 5.07 monofilament is intact bilateral feet. Sharp/dull sensation is also intact Bilateral feet. Proprioception is grossly intact. Vibratory sensation intact (pt able to sense vibration stop within 3-5 seconds)     Skin: Skin is warm and dry. Lesion and rash noted. No bruising, no burn and no laceration noted. He is not diaphoretic. There is erythema. No cyanosis. No pallor. Nails show no clubbing.   Ulcer location: posterior left heel  Signs of infection: local edema and erythema  Drainage:  None  Periwound: intact  Base: thin epithelial     Dermatitis and erythema noted to posterior left heel   Psychiatric: His mood appears not anxious. His affect is not inappropriate. His speech is not slurred. He is not combative. He is communicative. He is attentive.   Nursing note reviewed.              Assessment:       Encounter Diagnoses   Name Primary?    PVD (peripheral vascular disease) Yes    Pre-ulcerative calluses     Healed ulcer of left foot on examination     Plantar fat pad atrophy          Plan:       Janusz was seen today for foot problem.    Diagnoses and all orders for this visit:    PVD (peripheral vascular disease)    Pre-ulcerative calluses    Healed ulcer of left foot on examination    Plantar fat pad atrophy      I  counseled the patient on his conditions, their implications and medical management.    The site is cleansed of foreign material as much as possible. The patient is alerted to watch for any signs of infection (redness, pus, pain, increased swelling or fever) and call if such occurs.    Wound has remained healed but with significant callus formation and bleeding in its layers   I advised patient to check feet daily for signs of drainage or lesion re-opening   Discussed use of daily foot moisturizer to feet, avoiding the webspace's    Follow-up:Patient is to return to the clinic in 1-2 months for follow-up but should call [facility] immediately if any signs of infection, such as fever, chills, sweats, increased redness or pain.    Short-term goals include maintaining good offloading and minimizing bioburden, promoting granulation and epithelialization to healing.  Long-term goals include keeping the wound healed by good offloading and medical management under the direction of internist.

## 2018-09-15 ENCOUNTER — PATIENT MESSAGE (OUTPATIENT)
Dept: PODIATRY | Facility: CLINIC | Age: 73
End: 2018-09-15

## 2018-09-17 ENCOUNTER — PATIENT MESSAGE (OUTPATIENT)
Dept: ORTHOPEDICS | Facility: CLINIC | Age: 73
End: 2018-09-17

## 2018-09-18 ENCOUNTER — CLINICAL SUPPORT (OUTPATIENT)
Dept: ELECTROPHYSIOLOGY | Facility: CLINIC | Age: 73
End: 2018-09-18
Attending: INTERNAL MEDICINE
Payer: MEDICARE

## 2018-09-18 DIAGNOSIS — Z95.818 STATUS POST PLACEMENT OF IMPLANTABLE LOOP RECORDER: ICD-10-CM

## 2018-09-18 DIAGNOSIS — I48.0 PAF (PAROXYSMAL ATRIAL FIBRILLATION): ICD-10-CM

## 2018-09-18 PROCEDURE — 93299 LOOP RECORDER REMOTE: CPT | Mod: PBBFAC | Performed by: INTERNAL MEDICINE

## 2018-09-18 PROCEDURE — 93298 REM INTERROG DEV EVAL SCRMS: CPT | Mod: ,,, | Performed by: INTERNAL MEDICINE

## 2018-09-19 ENCOUNTER — HOSPITAL ENCOUNTER (OUTPATIENT)
Dept: RADIOLOGY | Facility: HOSPITAL | Age: 73
Discharge: HOME | End: 2018-09-19
Attending: ORTHOPAEDIC SURGERY
Payer: MEDICARE

## 2018-09-19 ENCOUNTER — TELEPHONE (OUTPATIENT)
Dept: ORTHOPEDICS | Facility: CLINIC | Age: 73
End: 2018-09-19

## 2018-09-19 DIAGNOSIS — S82.202F TYPE III OPEN FRACTURE OF LEFT TIBIA AND FIBULA WITH ROUTINE HEALING: Primary | ICD-10-CM

## 2018-09-19 DIAGNOSIS — S82.202F TYPE III OPEN FRACTURE OF LEFT TIBIA AND FIBULA WITH ROUTINE HEALING: ICD-10-CM

## 2018-09-19 DIAGNOSIS — S82.402F TYPE III OPEN FRACTURE OF LEFT TIBIA AND FIBULA WITH ROUTINE HEALING: ICD-10-CM

## 2018-09-19 DIAGNOSIS — S82.402F TYPE III OPEN FRACTURE OF LEFT TIBIA AND FIBULA WITH ROUTINE HEALING: Primary | ICD-10-CM

## 2018-09-25 ENCOUNTER — OFFICE VISIT (OUTPATIENT)
Dept: OPTOMETRY | Facility: CLINIC | Age: 73
End: 2018-09-25
Payer: MEDICARE

## 2018-09-25 DIAGNOSIS — B00.52 HERPES SIMPLEX DISCIFORM KERATITIS: ICD-10-CM

## 2018-09-25 DIAGNOSIS — H04.123 DRY EYE SYNDROME OF BOTH EYES: ICD-10-CM

## 2018-09-25 DIAGNOSIS — Z96.1 PSEUDOPHAKIA, BOTH EYES: ICD-10-CM

## 2018-09-25 DIAGNOSIS — I10 ESSENTIAL HYPERTENSION: Primary | ICD-10-CM

## 2018-09-25 PROCEDURE — 99999 PR PBB SHADOW E&M-EST. PATIENT-LVL II: CPT | Mod: PBBFAC,,, | Performed by: OPTOMETRIST

## 2018-09-25 PROCEDURE — 92014 COMPRE OPH EXAM EST PT 1/>: CPT | Mod: S$PBB,,, | Performed by: OPTOMETRIST

## 2018-09-25 PROCEDURE — 99212 OFFICE O/P EST SF 10 MIN: CPT | Mod: PBBFAC | Performed by: OPTOMETRIST

## 2018-09-25 NOTE — PROGRESS NOTES
HPI     Last eye exam was 3/28/18 with Dr. Dominguez.  Patient states vision has slowly gotten worse since cataract sx. Using   glasses to read small print. Wanted to get a prescription for distance   only glasses-has trouble with bifocals. Happy with OTC readers for small   print. Sensitive to bright light.  Patient denies diplopia, headaches, flashes/floaters, and pain.    AT's prn OU      Last edited by Melani Mackenzie on 9/25/2018  2:33 PM. (History)            Assessment /Plan     For exam results, see Encounter Report.    Essential hypertension    Herpes simplex disciform keratitis    Dry eye syndrome of both eyes    Pseudophakia, both eyes            1.  No retinopathy--monitor yearly.  BP control.  Eye health normal OU.  2.  Stable-no signs of flare-up.  3.  Recommend Systane at least 2x/day OU.  4.  Distance rx given.  History of trichiasis--no problems today.

## 2018-09-26 ENCOUNTER — HOSPITAL ENCOUNTER (OUTPATIENT)
Dept: RADIOLOGY | Facility: HOSPITAL | Age: 73
Discharge: HOME | End: 2018-09-26
Attending: ORTHOPAEDIC SURGERY
Payer: MEDICARE

## 2018-09-26 ENCOUNTER — OFFICE VISIT (OUTPATIENT)
Dept: ORTHOPEDICS | Facility: CLINIC | Age: 73
End: 2018-09-26
Payer: MEDICARE

## 2018-09-26 VITALS — WEIGHT: 315 LBS | HEIGHT: 73 IN | BODY MASS INDEX: 41.75 KG/M2

## 2018-09-26 DIAGNOSIS — S82.202F TYPE III OPEN FRACTURE OF LEFT TIBIA AND FIBULA WITH ROUTINE HEALING: Primary | ICD-10-CM

## 2018-09-26 DIAGNOSIS — S82.402F TYPE III OPEN FRACTURE OF LEFT TIBIA AND FIBULA WITH ROUTINE HEALING: Primary | ICD-10-CM

## 2018-09-26 DIAGNOSIS — S81.802S WOUND OF LEFT LEG, SEQUELA: ICD-10-CM

## 2018-09-26 PROCEDURE — 73610 X-RAY EXAM OF ANKLE: CPT | Mod: TC,LT

## 2018-09-26 PROCEDURE — 99214 OFFICE O/P EST MOD 30 MIN: CPT | Mod: S$PBB,,, | Performed by: ORTHOPAEDIC SURGERY

## 2018-09-26 PROCEDURE — 99213 OFFICE O/P EST LOW 20 MIN: CPT | Mod: PBBFAC,25 | Performed by: ORTHOPAEDIC SURGERY

## 2018-09-26 PROCEDURE — 99999 PR PBB SHADOW E&M-EST. PATIENT-LVL III: CPT | Mod: PBBFAC,,, | Performed by: ORTHOPAEDIC SURGERY

## 2018-09-26 PROCEDURE — 73610 X-RAY EXAM OF ANKLE: CPT | Mod: 26,LT,, | Performed by: RADIOLOGY

## 2018-09-26 PROCEDURE — 1101F PT FALLS ASSESS-DOCD LE1/YR: CPT | Mod: CPTII,,, | Performed by: ORTHOPAEDIC SURGERY

## 2018-09-27 ENCOUNTER — OFFICE VISIT (OUTPATIENT)
Dept: PODIATRY | Facility: CLINIC | Age: 73
End: 2018-09-27
Payer: MEDICARE

## 2018-09-27 VITALS — BODY MASS INDEX: 47.63 KG/M2 | WEIGHT: 315 LBS

## 2018-09-27 DIAGNOSIS — L84 PRE-ULCERATIVE CALLUSES: ICD-10-CM

## 2018-09-27 DIAGNOSIS — I73.9 PVD (PERIPHERAL VASCULAR DISEASE): Primary | ICD-10-CM

## 2018-09-27 DIAGNOSIS — L97.922 LEG ULCER, LEFT, WITH FAT LAYER EXPOSED: ICD-10-CM

## 2018-09-27 PROCEDURE — 99999 PR PBB SHADOW E&M-EST. PATIENT-LVL III: CPT | Mod: PBBFAC,,, | Performed by: PODIATRIST

## 2018-09-27 PROCEDURE — 99213 OFFICE O/P EST LOW 20 MIN: CPT | Mod: PBBFAC,PO | Performed by: PODIATRIST

## 2018-09-27 PROCEDURE — 99214 OFFICE O/P EST MOD 30 MIN: CPT | Mod: S$PBB,,, | Performed by: PODIATRIST

## 2018-09-27 PROCEDURE — 1101F PT FALLS ASSESS-DOCD LE1/YR: CPT | Mod: CPTII,,, | Performed by: PODIATRIST

## 2018-09-27 PROCEDURE — 99499 UNLISTED E&M SERVICE: CPT | Mod: HCNC,S$GLB,, | Performed by: PODIATRIST

## 2018-09-27 NOTE — PATIENT INSTRUCTIONS
Wound care Instructions:   · Once a day beginning in 24 hours::   ¨ Clean the toe separate from your body with warm running water and antibacterial soap such as dial for five   ¨ Clean any remaining crust away with soap and water using a cotton-tipped applicator.  ¨ DRY COMPLETELY  ¨ Apply honey to wound  ¨ Cover with a breathable bandage until there is no more drainage or open flesh.  · Change the dressing daily, or whenever it becomes wet or dirty.  · If you were prescribed antibiotics, take them as directed until they are all gone.  · Wear comfortable shoes with a lot of toe room, or open-toe sandals, while your toe is healing.  · You may use acetaminophen or ibuprofen to control pain, unless another medicine was prescribed. If you have chronic liver or kidney disease or ever had a stomach ulcer or GI bleeding, talk with your doctor before using these medicines.      When to seek medical advice  Call your health care provider right away if any of the following occur:  · Increasing redness, pain or swelling of the toe  · Red streaks in the skin leading away from the wound  · Continued pus or fluid drainage for more than 24 hours  · Fever of 100.4º F (38º C) or higher, or as directed by your health care provider

## 2018-09-27 NOTE — PROGRESS NOTES
Subjective:      Patient ID: Janusz Montgomery Jr. is a 73 y.o. male.    Chief Complaint: Peripheral Vascular Disease (left leg Pcp Dr. Weathers 2/7/18) and Wound Care    Janusz Montgomery Jr. is a 73 y.o. male returns to clinic for follow up of left foot ulcers. Patient's wife has been inspecting foot daily.  He relates callus formation where wound was present is painful when he walks barefoot.  Patient has new ulceration the medial left ankle. Seen by his orthopedist who prescribed medi honey.     Shoe gear:  Tennis shoes    Chief Complaint   Patient presents with    Peripheral Vascular Disease     left leg Pcp Dr. Weathers 2/7/18    Wound Care       Hemoglobin A1C   Date Value Ref Range Status   07/11/2016 5.3 4.5 - 6.2 % Final     Comment:     According to ADA guidelines, hemoglobin A1C <7.0% represents  optimal control in non-pregnant diabetic patients.  Different  metrics may apply to specific populations.   Standards of Medical Care in Diabetes - 2016.  For the purpose of screening for the presence of diabetes:  <5.7%     Consistent with the absence of diabetes  5.7-6.4%  Consistent with increasing risk for diabetes   (prediabetes)  >or=6.5%  Consistent with diabetes  Currently no consensus exists for use of hemoglobin A1C  for diagnosis of diabetes for children.     03/31/2015 5.3 4.5 - 6.2 % Final   03/03/2014 5.6 4.5 - 6.2 % Final         Current Outpatient Medications on File Prior to Visit   Medication Sig Dispense Refill    acyclovir (ZOVIRAX) 800 MG Tab Take 1 tablet (800 mg total) by mouth 2 (two) times daily. 180 tablet 6    apixaban 5 mg Tab Take 1 tablet (5 mg total) by mouth 2 (two) times daily. 60 tablet 11    artificial tears (ISOPTO TEARS) 0.5 % ophthalmic solution Place 1 drop into both eyes as needed. (Patient taking differently: Place 1 drop into both eyes as needed (for dry eyes). )      aspirin (ECOTRIN) 81 MG EC tablet Take 1 tablet (81 mg total) by mouth once daily.  0    atorvastatin  (LIPITOR) 40 MG tablet TAKE 1 TABLET BY MOUTH ONCE DAILY 90 tablet 0    CYANOCOBALAMIN, VITAMIN B-12, (VITAMIN B-12 ORAL) Take 2,500 mcg by mouth once daily.      furosemide (LASIX) 20 MG tablet Take 1 tablet (20 mg total) by mouth once daily. 90 tablet 0    gabapentin (NEURONTIN) 300 MG capsule TAKE ONE CAPSULE BY MOUTH THREE TIMES DAILY 90 capsule 11    honey (MEDIHONEY, HONEY,) 100 % Pste Apply 1 application topically 2 (two) times daily as needed. 1 Tube 3    melatonin 3 mg Tab Take 6 mg by mouth nightly as needed (for sleep).       multivitamin (MULTIPLE VITAMINS) per tablet Take 1 tablet by mouth once daily.      omeprazole (PRILOSEC) 20 MG capsule Take 1 capsule (20 mg total) by mouth daily as needed. 90 capsule 0    ramipril (ALTACE) 5 MG capsule Take 1 capsule (5 mg total) by mouth once daily. 90 capsule 1    sildenafil (VIAGRA) 100 MG tablet Take 1 tablet (100 mg total) by mouth daily as needed for Erectile Dysfunction. 10 tablet 11    tamsulosin (FLOMAX) 0.4 mg Cp24 TAKE ONE CAPSULE BY MOUTH ONCE DAILY 90 capsule 3    venlafaxine (EFFEXOR) 75 MG tablet Take 2 tablets (150 mg total) by mouth 2 (two) times daily. 360 tablet 1    vitamin D 1000 units Tab Take 1 tablet (1,000 Units total) by mouth once daily.      zinc sulfate (ZINCATE) 220 (50) mg capsule Take 1 capsule (220 mg total) by mouth once daily.       No current facility-administered medications on file prior to visit.        Allergies   Allergen Reactions    Ciprofloxacin Rash     Diffuse pruritic morbilliform rash developed 3/15/2017 after dose of cipro; previously in 2/2017 he had rash/fevers after initiation of cipro    Zosyn [Piperacillin-Tazobactam] Anaphylaxis     Diffuse pruritic morbilliform rash developed 3/15/2017.  Then, 430am dose on 3/16 and rash worsened with SOB/tachypnea but no hypoxemia.     Bacitracin Itching and Rash     Violaceous rash in area of topical Tx.        Past Surgical History:   Procedure Laterality  Date    APPLICATION-EXTERNAL FIXATION DEVICE, ankle Left 12/28/2016    Performed by Walter Cortés [facility] at [facility] OR [facility]    Cardiac stenting x2      CATARACT EXTRACTION W/  INTRAOCULAR LENS IMPLANT Right 3/29/2016    Dr. Conteh    CATARACT EXTRACTION W/  INTRAOCULAR LENS IMPLANT Left 4/12/2016        DEBRIDEMENT-FOOT Left 4/13/2017    Performed by [facility] at [facility] OR [facility]    EXTERNAL FIXATION TIBIAL FRACTURE Left 07/01/2016    FULL THICKNESS SKIN GRAFT from left shoulder Left 12/8/2015    Performed by Colt Sosa [facility] at [facility]    INSERTION, RADIOACTIVE SEED, PROSTATE N/A 8/8/2018    [facility]    INSERTION-INTRAOCULAR LENS (IOL) Left 4/12/2016    [facility]    INSERTION-INTRAOCULAR LENS (IOL) Right 3/29/2016    Performed by Pool [facility]h [facility] at [facility] OR [address] [place] [address]    MAPPING-ULTRASOUND  6/13/2018    [facility]    OPEN REDUCTION INTERNAL [id number] Left 4/13/2017    Performed by [facility] at [facility] OR [facility]    OPEN REDUCTION INTERNAL FIXATION-TIBIA Left 7/15/2016    Performed by [facility] at [facility] OR [facility]    ORIF TIBIA FRACTURE Left 07/15/2016    PHACOEMULSIFICATION-ASPIRATION-CATARACT Left 4/12/2016    [facility]    PHACOEMULSIFICATION-ASPIRATION-CATARACT Right 3/29/2016    [facility]    RADIOACTIVE SEED IMPLANTATION OF PROSTATE N/A 8/8/2018    Procedure: INSERTION, RADIOACTIVE SEED, PROSTATE;  Surgeon: Bipin Thompson MD;  Location: [facility] OR [address] [place] [address];  Service: Urology;  Laterality: N/A;  1 hour    REDUCTION-CLOSED-TIBIA Left 12/28/2016    Performed by Walter [facility] at [facility] OR [facility]    REMOVAL OF EXTERNAL FIXATION DEVICE Left 7/15/2016    Performed by Walter [facility]és [facility] at  [facility] OR 2ND [facility]    REMOVAL OF EXTERNAL FIXATION DEVICE, LEFT ANKLE C ARM Left 4/13/2017    Performed by [facility]    REMOVAL-HARDWARE-LEG Left 4/13/2017    [facility] at [facility]    REPAIR-MOHS DEFECT Basal Cell Left Lateral Eyebrow Left 12/8/2015    Per[facility] Colt [facility]osa [facility] at [facility] OR [facility]    Squamous cell cancer removal x3 with Mohs surgery      TONSILLECTOMY      TOTAL KNEE ARTHROPLASTY  10/2012    trus/bx         Family History   Problem Relation Age of Onset    Skin cancer Father     Lung cancer Father     Cancer Father         smoker,     Alzheimer's disease Mother     Hypertension Mother     Cancer Sister         colon, lung cancer     Cancer Brother         skin cancer, polypectomy     Peripheral vascular disease Unknown     Melanoma Neg Hx     Psoriasis Neg Hx     Lupus Neg Hx     Eczema Neg Hx     Amblyopia Neg Hx     Blindness Neg Hx     Cataracts Neg Hx     Diabetes Neg Hx     Glaucoma Neg Hx     Macular degeneration Neg Hx     Retinal detachment Neg Hx     Strabismus Neg Hx     Stroke Neg Hx     Thyroid disease Neg Hx     Acne Neg Hx        Social History     Socioeconomic History    Marital status:      Spouse name: Not on file    Number of children: Not on file    Years of education: Not on file    Highest education level: Not on file   Social Needs    Financial resource strain: Not on file    Food insecurity - worry: Not on file    Food insecurity - inability: Not on file    Transportation needs - medical: Not on file    Transportation needs - non-medical: Not on file   Occupational History    Occupation: Retired    Tobacco Use    Smoking status: Never Smoker    Smokeless tobacco: Never Used   Substance and Sexual Activity    Alcohol use: Yes     Comment: occasionally    Drug use: No    Sexual activity: Yes   Other Topics Concern    Not on file   Social History Narrative     Not on file       Review of Systems   Constitution: Negative for chills, fever and weakness.   Cardiovascular: Negative for claudication and leg swelling.   Respiratory: Negative for cough and shortness of breath.    Skin: Positive for dry skin and poor wound healing. Negative for itching and rash.   Musculoskeletal: Positive for arthritis, joint pain, joint swelling and myalgias. Negative for falls and muscle weakness.   Gastrointestinal: Negative for diarrhea, nausea and vomiting.   Neurological: Positive for paresthesias. Negative for numbness and tremors.   Psychiatric/Behavioral: Negative for altered mental status and hallucinations.         Objective:       Vitals:    09/27/18 1620   Weight: (!) 163.7 kg (361 lb)   PainSc: 0-No pain       Physical Exam   Constitutional:  Non-toxic appearance. He does not have a sickly appearance. No distress.   Pt. is well-developed, well-nourished, appears stated age, in no acute distress, alert and oriented x 3. No evidence of depression, anxiety, or agitation. Calm, cooperative, and communicative. Appropriate interactions and affect.   Cardiovascular:   Pulses:       Dorsalis pedis pulses are 2+ on the right side, and 2+ on the left side.        Posterior tibial pulses are 1+ on the right side, and 1+ on the left side.        Pulmonary/Chest: No respiratory distress.   Musculoskeletal:        Right ankle: He exhibits swelling. No tenderness. No lateral malleolus, no medial malleolus, no AITFL, no CF ligament, no head of 5th metatarsal and no proximal fibula tenderness found. Achilles tendon exhibits no pain, no defect and normal Zabala's test results.        Left ankle: He exhibits swelling. No tenderness. No lateral malleolus, no medial malleolus, no AITFL, no CF ligament and no posterior TFL tenderness found. Achilles tendon exhibits no pain, no defect and normal Zabala's test results.        Right foot: There is no tenderness and no bony tenderness.        Left  foot: There is no tenderness and no bony tenderness.   Decreased stride, station of gait.  apropulsive toe off.  Increased angle and base of gait.    Patient has hammertoes of digits 2-5 bilateral partially reducible without symptom today.     There is equinus deformity bilateral. There is limitation of dorsiflexion with knees extended and with knees flexed.    Shoes reveals lateral heel counter wear bilateral in athletic shoes.        Lymphadenopathy:   No lymphatic streaking    Negative lymphadenopathy bilateral popliteal fossa and tarsal tunnel.     Neurological:   [place]a-Jenn 5.07 monofilament is intact bilateral feet. Sharp/dull sensation is also intact Bilateral feet. Proprioception is grossly intact. Vibratory sensation intact (pt able to sense vibration stop within 3-5 seconds)     Skin: Skin is warm and dry. Lesion and rash noted. No bruising, no burn and no laceration noted. He is not diaphoretic. There is erythema. No cyanosis. No pallor. Nails show no clubbing.   Ulcer location: medial left ankle  Signs of infection:local edema and erythema  Drainage: Sero-Sanguinous  Purulence: no  Crepitus/fluctuance: no  Periwound: Reddened  Base: Mixed Granular/Fibrotic  Depth: subcutaneous tissue  Probe to bone: no      Ulcer location: posterior left heel  Signs of infection: local edema and erythema  Drainage:  None  Periwound: intact  Base: thin epithelial     Dermatitis and erythema noted to posterior left heel   Psychiatric: His mood appears not anxious. His affect is not inappropriate. His speech is not slurred. He is not combative. He is communicative. He is attentive.   Nursing note reviewed.    09/27                  Assessment:       Encounter Diagnoses   Name Primary?    PVD (peripheral vascular disease) Yes    Pre-ulcerative calluses     Leg ulcer, left, with fat layer exposed          Plan:       Janusz was seen today for peripheral vascular disease and wound care.    Diagnoses and all orders for  this visit:    PVD (peripheral vascular disease)    Pre-ulcerative calluses    Leg ulcer, left, with fat layer exposed      I counseled the patient on his conditions, their implications and medical management.    The site is cleansed of foreign material as much as possible. The patient is alerted to watch for any signs of infection (redness, pus, pain, increased swelling or fever) and call if such occurs.    Heel wound has remained healed but with significant callus formation and bleeding in its layers   I advised patient to check feet daily for signs of drainage or lesion re-opening   Discussed use of daily foot moisturizer to feet, avoiding the webspace's    Left ankle ulcer cleansed with saline and completely dried.  medihoney and mepilex border.  Home wound care dispensed    - Tubigrips to BLE; patient is to elevate legs. When sleeping, place a pillow under lower extremities. When sitting, support the legs so that they are level with the waist.    Follow-up:Patient is to return to the clinic in 2 weeks for follow-up but should call [facility] immediately if any signs of infection, such as fever, chills, sweats, increased redness or pain.    Short-term goals include maintaining good offloading and minimizing bioburden, promoting granulation and epithelialization to healing.  Long-term goals include keeping the wound healed by good offloading and medical management under the direction of internist.

## 2018-10-02 ENCOUNTER — OFFICE VISIT (OUTPATIENT)
Dept: CARDIOLOGY | Facility: CLINIC | Age: 73
End: 2018-10-02
Payer: MEDICARE

## 2018-10-02 VITALS
SYSTOLIC BLOOD PRESSURE: 122 MMHG | WEIGHT: 315 LBS | DIASTOLIC BLOOD PRESSURE: 78 MMHG | HEART RATE: 72 BPM | OXYGEN SATURATION: 98 % | BODY MASS INDEX: 47.41 KG/M2 | RESPIRATION RATE: 22 BRPM

## 2018-10-02 DIAGNOSIS — I73.9 PVD (PERIPHERAL VASCULAR DISEASE): ICD-10-CM

## 2018-10-02 DIAGNOSIS — G47.33 OBSTRUCTIVE SLEEP APNEA ON CPAP: ICD-10-CM

## 2018-10-02 DIAGNOSIS — I10 ESSENTIAL HYPERTENSION: ICD-10-CM

## 2018-10-02 DIAGNOSIS — E66.01 MORBID OBESITY WITH BMI OF 40.0-44.9, ADULT: ICD-10-CM

## 2018-10-02 DIAGNOSIS — E78.5 HYPERLIPIDEMIA, UNSPECIFIED HYPERLIPIDEMIA TYPE: ICD-10-CM

## 2018-10-02 DIAGNOSIS — I48.0 PAF (PAROXYSMAL ATRIAL FIBRILLATION): Primary | ICD-10-CM

## 2018-10-02 DIAGNOSIS — I25.10 CORONARY ARTERY DISEASE INVOLVING NATIVE CORONARY ARTERY OF NATIVE HEART WITHOUT ANGINA PECTORIS: ICD-10-CM

## 2018-10-02 DIAGNOSIS — R06.02 SOB (SHORTNESS OF BREATH): ICD-10-CM

## 2018-10-02 PROCEDURE — 3074F SYST BP LT 130 MM HG: CPT | Mod: CPTII,,, | Performed by: INTERNAL MEDICINE

## 2018-10-02 PROCEDURE — 99213 OFFICE O/P EST LOW 20 MIN: CPT | Mod: PBBFAC,25 | Performed by: INTERNAL MEDICINE

## 2018-10-02 PROCEDURE — 99214 OFFICE O/P EST MOD 30 MIN: CPT | Mod: S$PBB,,, | Performed by: INTERNAL MEDICINE

## 2018-10-02 PROCEDURE — 93005 ELECTROCARDIOGRAM TRACING: CPT | Mod: PBBFAC | Performed by: INTERNAL MEDICINE

## 2018-10-02 PROCEDURE — 1101F PT FALLS ASSESS-DOCD LE1/YR: CPT | Mod: CPTII,,, | Performed by: INTERNAL MEDICINE

## 2018-10-02 PROCEDURE — 3078F DIAST BP <80 MM HG: CPT | Mod: CPTII,,, | Performed by: INTERNAL MEDICINE

## 2018-10-02 PROCEDURE — 93010 ELECTROCARDIOGRAM REPORT: CPT | Mod: S$PBB,,, | Performed by: INTERNAL MEDICINE

## 2018-10-02 PROCEDURE — 99999 PR PBB SHADOW E&M-EST. PATIENT-LVL III: CPT | Mod: PBBFAC,,, | Performed by: INTERNAL MEDICINE

## 2018-10-02 NOTE — PROGRESS NOTES
Subjective:    Patient ID:  Janusz Montgomery Jr. is a 73 y.o. male who presents for evaluation of Follow-up (6 mo )      HPI   previous history:  Here follow-up of CAD and paroxysmal atrial fibrillation.  He did follow-up with EP at [facility] and his been started on blood thinners.  He still has mainly issues with shortness of breath and little bit of swelling from time to time.  He takes Lasix as needed.  He denies any PND or orthopnea.  He did follow-up is now [employer] with CPAP.  He's not expressing dizziness, presyncope or syncope.  We discussed again sodium intake which is a fan of the [employer] buffet.  We also discussed exercise as tolerated.    Today:  Here for follow-up of CAD and paroxysmal atrial fibrillation.  Had no worsening cardiopulmonary complaints.  He says slowly able to go up a flight of stairs without any issues.  He denies any chest pain but does get shortness of breath on heavier activity.  He's had no sustained tachycardia or palpitations.  Recent loop recorder transmission showed only sinus rhythm.  She denies any PND, orthopnea or lower edema.  He's not expressing dizziness, presyncope or syncope.  He does need cardiac clearance for prostate seeding with diagnosed cancer.    Review of Systems   Constitution: Negative.   HENT: Negative.    Eyes: Negative.    Cardiovascular: Positive for dyspnea on exertion. Negative for chest pain, irregular heartbeat, leg swelling, near-syncope, orthopnea, palpitations, paroxysmal nocturnal dyspnea and syncope.   Respiratory: Positive for shortness of breath.    Skin: Negative.    Musculoskeletal: Negative.    Gastrointestinal: Negative for abdominal pain, constipation and diarrhea.   Genitourinary: Negative for dysuria.   Neurological: Negative for dizziness.   Psychiatric/Behavioral: Negative.         Objective:    Physical Exam   Constitutional: He is oriented to person, place, and time. He appears well-developed and well-nourished. No distress.   HENT:    Head: Normocephalic and atraumatic.   Eyes: Conjunctivae and EOM are normal. Pupils are equal, round, and reactive to light.   Neck: Normal range of motion. Neck supple. No thyromegaly present.   Cardiovascular: Normal rate, regular rhythm and normal heart sounds.   No murmur heard.  Pulmonary/Chest: Effort normal and breath sounds normal. No respiratory distress. He has no wheezes. He has no rales. He exhibits no tenderness.   Abdominal: Soft. Bowel sounds are normal.   Musculoskeletal: He exhibits no edema.   Neurological: He is alert and oriented to person, place, and time.   Skin: Skin is warm and dry.   Psychiatric: He has a normal mood and affect. His behavior is normal.             Lower extremity arterial ultrasound: 2016  No appreciable focal stenosis; however, ankle-brachial index on the left is abnormal.    Echo: 2-17  CONCLUSIONS     1 - Normal left ventricular systolic function (EF 55-60%).     2 - Normal left ventricular diastolic function.     3 - Normal right ventricular systolic function .     NST:  12-17  Impression: ABNORMAL MYOCARDIAL PERFUSION  1. There is evidence for mild myocardial ischemia in the anterior wall of the left ventricle. Specificity is reduced secondary to body habitus.   2. The perfusion scan is free of evidence for myocardial injury.   3. There is a moderate intensity fixed defect in the inferior wall of the left ventricle, secondary to diaphragm attenuation.   4. Resting wall motion is physiologic.   5. Visually estimated LV function is normal.   6. The ventricular volumes are normal at rest and stress.   7. The extracardiac distribution of radioactivity is normal.   8. When compared to the previous study from 01/14/2009, possible anterior ischemia (vs attenuation artifact) and fixed inferior defect (suggestive of attenuation artifact) now present.  Consider PET-stress for further evaluation if clinically indicated.    LDL-84  11-17    Carotid ultrasound:  5-18  CONCLUSIONS    There is 20 - 39% right Internal Carotid stenosis.  There is 20 - 39% left Internal Carotid stenosis.    Assessment:       1. PAF (paroxysmal atrial fibrillation)    2. Coronary artery disease involving native coronary artery of native heart without angina pectoris s/p RCA stent    3. PVD (peripheral vascular disease)    4. Essential hypertension    5. Morbid obesity with BMI of 40.0-44.9, adult    6. Obstructive sleep apnea on CPAP    7. Hyperlipidemia, unspecified hyperlipidemia type         Plan:       -Continue med therapy  -Emphasized sodium restriction  -Stable normal sinus rhythm on loop recorder interrogation * followed at [facility]  -Now on Eliquis for OAC  -Follow-up with rehabilitation and wound care      Return to clinic in 6 months

## 2018-10-04 ENCOUNTER — PATIENT MESSAGE (OUTPATIENT)
Dept: ORTHOPEDICS | Facility: CLINIC | Age: 73
End: 2018-10-04

## 2018-10-09 ENCOUNTER — OFFICE VISIT (OUTPATIENT)
Dept: DERMATOLOGY | Facility: CLINIC | Age: 73
End: 2018-10-09
Payer: MEDICARE

## 2018-10-09 DIAGNOSIS — D48.5 NEOPLASM OF UNCERTAIN BEHAVIOR OF SKIN: Primary | ICD-10-CM

## 2018-10-09 DIAGNOSIS — L57.0 AK (ACTINIC KERATOSIS): ICD-10-CM

## 2018-10-09 DIAGNOSIS — L82.1 SK (SEBORRHEIC KERATOSIS): ICD-10-CM

## 2018-10-09 DIAGNOSIS — Z85.828 PERSONAL HISTORY OF OTHER MALIGNANT NEOPLASM OF SKIN: ICD-10-CM

## 2018-10-09 DIAGNOSIS — L73.8 SEBACEOUS GLAND HYPERPLASIA: ICD-10-CM

## 2018-10-09 DIAGNOSIS — L57.8 CHRONIC SOLAR DERMATITIS: ICD-10-CM

## 2018-10-09 PROCEDURE — 88305 TISSUE EXAM BY PATHOLOGIST: CPT | Mod: 59 | Performed by: PATHOLOGY

## 2018-10-09 PROCEDURE — 99213 OFFICE O/P EST LOW 20 MIN: CPT | Mod: PBBFAC,25 | Performed by: DERMATOLOGY

## 2018-10-09 PROCEDURE — 11100 PR BIOPSY OF SKIN LESION: CPT | Mod: 59,S$PBB,, | Performed by: DERMATOLOGY

## 2018-10-09 PROCEDURE — 99213 OFFICE O/P EST LOW 20 MIN: CPT | Mod: 25,S$PBB,, | Performed by: DERMATOLOGY

## 2018-10-09 PROCEDURE — 17003 DESTRUCT PREMALG LES 2-14: CPT | Mod: S$PBB,,, | Performed by: DERMATOLOGY

## 2018-10-09 PROCEDURE — 17003 DESTRUCT PREMALG LES 2-14: CPT | Mod: PBBFAC | Performed by: DERMATOLOGY

## 2018-10-09 PROCEDURE — 17000 DESTRUCT PREMALG LESION: CPT | Mod: S$PBB,51,59, | Performed by: DERMATOLOGY

## 2018-10-09 PROCEDURE — 99999 PR PBB SHADOW E&M-EST. PATIENT-LVL III: CPT | Mod: PBBFAC,,, | Performed by: DERMATOLOGY

## 2018-10-09 PROCEDURE — 11101 PR BIOPSY, EACH ADDED LESION: CPT | Mod: S$PBB,,, | Performed by: DERMATOLOGY

## 2018-10-09 PROCEDURE — 11100 PR BIOPSY OF SKIN LESION: CPT | Mod: 59,PBBFAC | Performed by: DERMATOLOGY

## 2018-10-09 PROCEDURE — 17000 DESTRUCT PREMALG LESION: CPT | Mod: PBBFAC | Performed by: DERMATOLOGY

## 2018-10-09 PROCEDURE — 11101 PR BIOPSY, EACH ADDED LESION: CPT | Mod: 59,PBBFAC | Performed by: DERMATOLOGY

## 2018-10-09 PROCEDURE — 1101F PT FALLS ASSESS-DOCD LE1/YR: CPT | Mod: CPTII,,, | Performed by: DERMATOLOGY

## 2018-10-09 RX ORDER — AMOXICILLIN 500 MG/1
CAPSULE ORAL
Refills: 0 | COMMUNITY
Start: 2018-10-05 | End: 2018-12-18 | Stop reason: ALTCHOICE

## 2018-10-09 NOTE — PROGRESS NOTES
Subjective:       Patient ID:  Janusz Montgomery Jr. is a 73 y.o. male who presents for   Chief Complaint   Patient presents with    Skin Check     History of Present Illness: The patient presents for follow up of skin check.    The patient was last seen on: 2/22/18 for cryosurgery to actinic keratoses which have resolved.     Other skin complaints: scaly spot on left leg x year.    This is a high risk patient here to check for the development of new lesions.    Using am lactin for arms and hands biw - tiw.              Review of Systems   Constitutional: Negative for fever, chills and weight loss (wt # 360# - stable).   Skin: Positive for wears hat ( when outdoors for long periods of time). Negative for itching, rash, daily sunscreen use, activity-related sunscreen use ( rarely outdoors) and recent sunburn.   Hematologic/Lymphatic: Bruises/bleeds easily (takes aspirin).        Objective:    Physical Exam   Constitutional: He appears well-developed and well-nourished. He is obese.  No distress.   HENT:   Mouth/Throat: Lips normal.    Eyes: Lids are normal.  No conjunctival no injection.   Cardiovascular: There is dependent edema (legs).     Lymphadenopathy:        Head (right side): No submental, no submandibular, no preauricular, no posterior auricular and no occipital adenopathy present.        Head (left side): No submental, no submandibular, no preauricular, no posterior auricular and no occipital adenopathy present.     He has no axillary adenopathy.   Neurological: He is alert and oriented to person, place, and time. He is not disoriented.   Psychiatric: He has a normal mood and affect.   Skin:   Areas Examined (abnormalities noted in diagram):   Scalp / Hair Palpated and Inspected  Head / Face Inspection Performed  Neck Inspection Performed  Chest / Axilla Inspection Performed  Back Inspection Performed  RUE Inspected  LUE Inspection Performed  LLE Inspection Performed  Nails and Digits Inspection  Performed                       Diagram Legend     Erythematous scaling macule/papule c/w actinic keratosis       Vascular papule c/w angioma      Pigmented verrucoid papule/plaque c/w seborrheic keratosis      Yellow umbilicated papule c/w sebaceous hyperplasia      Irregularly shaped tan macule c/w lentigo     1-2 mm smooth white papules consistent with Milia      Movable subcutaneous cyst with punctum c/w epidermal inclusion cyst      Subcutaneous movable cyst c/w pilar cyst      Firm pink to brown papule c/w dermatofibroma      Pedunculated fleshy papule(s) c/w skin tag(s)      Evenly pigmented macule c/w junctional nevus     Mildly variegated pigmented, slightly irregular-bordered macule c/w mildly atypical nevus      Flesh colored to evenly pigmented papule c/w intradermal nevus       Pink pearly papule/plaque c/w basal cell carcinoma      Erythematous hyperkeratotic cursted plaque c/w SCC      Surgical scar with no sign of skin cancer recurrence      Open and closed comedones      Inflammatory papules and pustules      Verrucoid papule consistent consistent with wart     Erythematous eczematous patches and plaques     Dystrophic onycholytic nail with subungual debris c/w onychomycosis     Umbilicated papule    Erythematous-base heme-crusted tan verrucoid plaque consistent with inflamed seborrheic keratosis     Erythematous Silvery Scaling Plaque c/w Psoriasis     See annotation              Assessment / Plan:      Pathology Orders:     Normal Orders This Visit    Tissue Specimen To Pathology, Dermatology     Questions:    Directional Terms:  Other(comment)    Clinical information:  r/o superficial bcc    Specific Site:  forehead    Tissue Specimen To Pathology, Dermatology     Questions:    Directional Terms:  Other(comment)    Clinical information:  r/o bcc vs superficial bcc    Specific Site:  right medial shoulder        Neoplasm of uncertain behavior of skin  -     Tissue Specimen To Pathology,  Dermatology  -     Tissue Specimen To Pathology, Dermatology    Shave biopsy procedure note:    Shave biopsy x 2 performed after verbal consent including risk of infection, scar, recurrence, need for additional treatment of site. Area prepped with alcohol, anesthetized with approximately 1.0cc of 1% lidocaine with epinephrine. Lesional tissue shaved with razor blade. Hemostasis achieved with application of aluminum chloride followed by hyfrecation. No complications. Dressing applied. Wound care explained.        AK (actinic keratosis)  Cryosurgery Procedure Note    Verbal consent from the patient is obtained including, but not limited to, risk of hypopigmentation/hyperpigmentation, scar, recurrence of lesion. The patient is aware of the precancerous quality and need for treatment of these lesions. Liquid nitrogen cryosurgery is applied to the 7 actinic keratoses, as detailed in the physical exam, to produce a freeze injury. The patient is aware that blisters may form and is instructed on wound care with gentle cleansing and use of vaseline ointment to keep moist until healed. The patient is supplied a handout on cryosurgery and is instructed to call if lesions do not completely resolve.      Chronic solar dermatitis  Encourage Am Lactin lotion or cream to arms and hands nightly. Available over-the counter.      SK (seborrheic keratosis)  These are benign inherited growths without a malignant potential. Reassurance given to patient. No treatment is necessary.       Sebaceous gland hyperplasia   - stable and chronic      Personal history of other malignant neoplasm of skin  Area(s) of previous NMSC evaluated with no signs of recurrence.    Upper body skin examination performed today including at least 6 points as noted in physical examination. Suspicious lesions noted.               Follow-up in about 6 months (around 4/9/2019).

## 2018-10-09 NOTE — PATIENT INSTRUCTIONS
" Shave Biopsy Wound Care    Your doctor has performed a shave biopsy today.  A band aid and vaseline ointment has been placed over the site.  This should remain in place for 24 hours.  It is recommended that you keep the area dry for the first 24 hours.  After 24 hours, you may remove the band aid and wash the area with warm soap and water and apply Vaseline jelly.  Many patients prefer to use Neosporin or Bacitracin ointment.  This is acceptable; however, know that you can develop an allergy to this medication even if you have used it safely for years.  It is important to keep the area moist.  Letting it dry out and get air slows healing time, and will worsen the scar.  Band aid is optional after first 24 hours.      If you notice increasing redness, tenderness, pain, or yellow drainage at the biopsy site, please notify your doctor.  These are signs of an infection.    If your biopsy site is bleeding, apply firm pressure for 15 minutes straight.  Repeat for another 15 minutes, if it is still bleeding.   If the surgical site continues to bleed, then please contact your doctor.      For [facility] users:   You will receive a [facility] notification after the pathologist has finished reviewing your biopsy specimen. Pathology results, however, will not be released online so you will see a "no content" message. Once your dermatologist reviews and clinically correlates your biopsy results, you will either receive a letter in the mail with the results of a phone call from your doctor's office if further explanation or treatment is warranted.       [address] [place] [address]/ [phone] FAX/ [email or web address]      CRYOSURGERY      Your doctor has used a method called cryosurgery to treat your skin condition. Cryosurgery refers to the use of very cold substances to treat a variety of skin conditions such as warts, pre-skin cancers, molluscum contagiosum, sun spots, and several benign " growths. The substance we use in cryosurgery is liquid nitrogen and is so cold (-195 degrees Celsius) that is burns when administered.     Following treatment in the office, the skin may immediately burn and become red. You may find the area around the lesion is affected as well. It is sometimes necessary to treat not only the lesion, but a small area of the surrounding normal skin to achieve a good response.     A blister, and even a blood filled blister, may form after treatment.   This is a normal response. If the blister is painful, it is acceptable to sterilize a needle and with rubbing alcohol and gently pop the blister. It is important that you gently wash the area with soap and warm water as the blister fluid may contain wart virus if a wart was treated. Do no remove the roof of the blister.     The area treated can take anywhere from 1-3 weeks to heal. Healing time depends on the kind skin lesion treated, the location, and how aggressively the lesion was treated. It is recommended that the areas treated are covered with Vaseline or bacitracin ointment and a band-aid. If a band-aid is not practical, just ointment applied several times per day will do. Keeping these areas moist will speed the healing time.    Treatment with liquid nitrogen can leave a scar. In dark skin, it may be a light or dark scar, in light skin it may be a white or pink scar. These will generally fade with time, but may never go away completely.     If you have any concerns after your treatment, please feel free to call the office.       [facility] [place] [address]/ [phone] FAX/ [email or web address]

## 2018-10-11 ENCOUNTER — PATIENT MESSAGE (OUTPATIENT)
Dept: FAMILY MEDICINE | Facility: CLINIC | Age: 73
End: 2018-10-11

## 2018-10-11 ENCOUNTER — PATIENT MESSAGE (OUTPATIENT)
Dept: ADMINISTRATIVE | Facility: OTHER | Age: 73
End: 2018-10-11

## 2018-10-11 ENCOUNTER — OFFICE VISIT (OUTPATIENT)
Dept: PODIATRY | Facility: CLINIC | Age: 73
End: 2018-10-11
Payer: MEDICARE

## 2018-10-11 VITALS
HEIGHT: 73 IN | BODY MASS INDEX: 41.75 KG/M2 | SYSTOLIC BLOOD PRESSURE: 138 MMHG | WEIGHT: 315 LBS | DIASTOLIC BLOOD PRESSURE: 40 MMHG

## 2018-10-11 DIAGNOSIS — L84 PRE-ULCERATIVE CALLUSES: ICD-10-CM

## 2018-10-11 DIAGNOSIS — I73.9 PVD (PERIPHERAL VASCULAR DISEASE): Primary | ICD-10-CM

## 2018-10-11 DIAGNOSIS — L90.9 PLANTAR FAT PAD ATROPHY: ICD-10-CM

## 2018-10-11 PROCEDURE — 1101F PT FALLS ASSESS-DOCD LE1/YR: CPT | Mod: CPTII,,, | Performed by: PODIATRIST

## 2018-10-11 PROCEDURE — 99213 OFFICE O/P EST LOW 20 MIN: CPT | Mod: PBBFAC,PO | Performed by: PODIATRIST

## 2018-10-11 PROCEDURE — 99999 PR PBB SHADOW E&M-EST. PATIENT-LVL III: CPT | Mod: PBBFAC,,, | Performed by: PODIATRIST

## 2018-10-11 PROCEDURE — 3075F SYST BP GE 130 - 139MM HG: CPT | Mod: CPTII,,, | Performed by: PODIATRIST

## 2018-10-11 PROCEDURE — 3078F DIAST BP <80 MM HG: CPT | Mod: CPTII,,, | Performed by: PODIATRIST

## 2018-10-11 PROCEDURE — 99213 OFFICE O/P EST LOW 20 MIN: CPT | Mod: S$PBB,,, | Performed by: PODIATRIST

## 2018-10-11 NOTE — PROGRESS NOTES
Subjective:      Patient ID: Janusz Montgomery Jr. is a 73 y.o. male.    Chief Complaint: Wound Care (PCP Dr Weathers 2/7/18)    Janusz Montgomery Jr. is a 73 y.o. male returns to clinic for follow up of left foot and ankle ulcers. Patient's wife has been inspecting foot daily.  He has been caring for all lesions as instructed.  He relates ankle ulcer has healed.  No new pedal complaints    Shoe gear:  Tennis shoes    Chief Complaint   Patient presents with    Wound Care     PCP Dr Weathers 2/7/18       Hemoglobin A1C   Date Value Ref Range Status   07/11/2016 5.3 4.5 - 6.2 % Final     Comment:     According to ADA guidelines, hemoglobin A1C <7.0% represents  optimal control in non-pregnant diabetic patients.  Different  metrics may apply to specific populations.   Standards of Medical Care in Diabetes - 2016.  For the purpose of screening for the presence of diabetes:  <5.7%     Consistent with the absence of diabetes  5.7-6.4%  Consistent with increasing risk for diabetes   (prediabetes)  >or=6.5%  Consistent with diabetes  Currently no consensus exists for use of hemoglobin A1C  for diagnosis of diabetes for children.     03/31/2015 5.3 4.5 - 6.2 % Final   03/03/2014 5.6 4.5 - 6.2 % Final         Current Outpatient Medications on File Prior to Visit   Medication Sig Dispense Refill    acyclovir (ZOVIRAX) 800 MG Tab Take 1 tablet (800 mg total) by mouth 2 (two) times daily. 180 tablet 6    amoxicillin (AMOXIL) 500 MG capsule TAKE 2 CAPSULES BY MOUTH STAT, THEN 1 CAPSULE 4 TIMES DAILY UNTIL GONE  0    apixaban 5 mg Tab Take 1 tablet (5 mg total) by mouth 2 (two) times daily. 60 tablet 11    artificial tears (ISOPTO TEARS) 0.5 % ophthalmic solution Place 1 drop into both eyes as needed. (Patient taking differently: Place 1 drop into both eyes as needed (for dry eyes). )      aspirin (ECOTRIN) 81 MG EC tablet Take 1 tablet (81 mg total) by mouth once daily.  0    atorvastatin (LIPITOR) 40 MG tablet TAKE 1 TABLET BY  MOUTH ONCE DAILY 90 tablet 0    CYANOCOBALAMIN, VITAMIN B-12, (VITAMIN B-12 ORAL) Take 2,500 mcg by mouth once daily.      furosemide (LASIX) 20 MG tablet Take 1 tablet (20 mg total) by mouth once daily. 90 tablet 0    gabapentin (NEURONTIN) 300 MG capsule TAKE ONE CAPSULE BY MOUTH THREE TIMES DAILY 90 capsule 11    honey (MEDIHONEY, HONEY,) 100 % Pste Apply 1 application topically 2 (two) times daily as needed. 1 Tube 3    melatonin 3 mg Tab Take 6 mg by mouth nightly as needed (for sleep).       multivitamin (MULTIPLE VITAMINS) per tablet Take 1 tablet by mouth once daily.      omeprazole (PRILOSEC) 20 MG capsule Take 1 capsule (20 mg total) by mouth daily as needed. 90 capsule 0    ramipril (ALTACE) 5 MG capsule Take 1 capsule (5 mg total) by mouth once daily. 90 capsule 1    sildenafil (VIAGRA) 100 MG tablet Take 1 tablet (100 mg total) by mouth daily as needed for Erectile Dysfunction. 10 tablet 11    tamsulosin (FLOMAX) 0.4 mg Cp24 TAKE ONE CAPSULE BY MOUTH ONCE DAILY 90 capsule 3    venlafaxine (EFFEXOR) 75 MG tablet Take 2 tablets (150 mg total) by mouth 2 (two) times daily. 360 tablet 1    vitamin D 1000 units Tab Take 1 tablet (1,000 Units total) by mouth once daily.      zinc sulfate (ZINCATE) 220 (50) mg capsule Take 1 capsule (220 mg total) by mouth once daily.       No current facility-administered medications on file prior to visit.        Allergies   Allergen Reactions    Ciprofloxacin Rash     Diffuse pruritic morbilliform rash developed 3/15/2017 after dose of cipro; previously in 2/2017 he had rash/fevers after initiation of cipro    Zosyn [Piperacillin-Tazobactam] Anaphylaxis     Diffuse pruritic morbilliform rash developed 3/15/2017.  Then, 430am dose on 3/16 and rash worsened with SOB/tachypnea but no hypoxemia.     Bacitracin Itching and Rash     Violaceous rash in area of topical Tx.        Past Surgical History:   Procedure Laterality Date    APPLICATION-EXTERNAL FIXATION  DEVICE, ankle Left 12/28/2016    [facility]formed [facility] at [facility]    Cardiac stenting x2      CATARACT EXTRACTION W/  INTRAOCULAR LENS IMPLANT Right 3/29/2016    Dr. Conteh    CATARACT EXTRACTION W/  INTRAOCULAR LENS IMPLANT Left 4/12/2016        DEBRIDEMENT-FOOT Left 4/13/2017    Performed [facility] at [facility]    EXTERNAL FIXATION TIBIAL FRACTURE Left 07/01/2016    FULL THICKNESS SKIN GRAFT from left shoulder Left 12/8/2015    Performed by Colt Sosa [facility] at [facility]    INSERTION, RADIOACTIVE SEED, PROSTATE N/A 8/8/2018    [facility]    INSERTION-INTRAOCULAR LENS (IOL) Left 4/12/2016    [facility]    INSERTION-INTRAOCULAR LENS (IOL) Right 3/29/2016    [facility]    MAPPING-ULTRASOUND  6/13/2018    [facility]    OPEN REDUCTION INTERNAL [id number] Left 4/13/2017    Per[facility]    OPEN REDUCTION INTERNAL FIXATION-TIBIA Left 7/15/2016    [facility]    ORIF TIBIA FRACTURE Left 07/15/2016    PHACOEMULSIFICATION-ASPIRATION-CATARACT Left 4/12/2016    [facility]    PHACOEMULSIFICATION-ASPIRATION-CATARACT Right 3/29/2016    [facility]    RADIOACTIVE SEED IMPLANTATION OF PROSTATE N/A 8/8/2018    Procedure: INSERTION, RADIOACTIVE SEED, PROSTATE;  Surgeon: Bipin Thompson MD;  Location: [facility] OR [address] [place] [address];  Service: Urology;  Laterality: N/A;  1 hour    REDUCTION-CLOSED-TIBIA Left 12/28/2016    [facility] by [facility] at [facility]    REMOVAL OF EXTERNAL FIXATION DEVICE Left 7/15/2016    [facility]    REMOVAL OF EXTERNAL  FIXATION DEVICE, LEFT ANKLE C ARM Left 4/13/2017    [facility]    REMOVAL-HARDWARE-LEG Left 4/13/2017    [facility]    REPAIR-MOHS DEFECT Basal Cell Left Lateral Eyebrow Left 12/8/2015    [facility]t [facility] at [facility]    Squamous cell cancer removal x3 with Mohs surgery      TONSILLECTOMY      TOTAL KNEE ARTHROPLASTY  10/2012    trus/bx         Family History   Problem Relation Age of Onset    Skin cancer Father     Lung cancer Father     Cancer Father         smoker,     Alzheimer's disease Mother     Hypertension Mother     Cancer Sister         colon, lung cancer     Cancer Brother         skin cancer, polypectomy     Peripheral vascular disease Unknown     Melanoma Neg Hx     Psoriasis Neg Hx     Lupus Neg Hx     Eczema Neg Hx     Amblyopia Neg Hx     Blindness Neg Hx     Cataracts Neg Hx     Diabetes Neg Hx     Glaucoma Neg Hx     Macular degeneration Neg Hx     Retinal detachment Neg Hx     Strabismus Neg Hx     Stroke Neg Hx     Thyroid disease Neg Hx     Acne Neg Hx        Social History     Socioeconomic History    Marital status:      Spouse name: Not on file    Number of children: Not on file    Years of education: Not on file    Highest education level: Not on file   Social Needs    Financial resource strain: Not on file    Food insecurity - worry: Not on file    Food insecurity - inability: Not on file    Transportation needs - medical: Not on file    Transportation needs - non-medical: Not on file   Occupational History    Occupation: Retired    Tobacco Use    Smoking status: Never Smoker    Smokeless tobacco: Never Used   Substance and Sexual Activity    Alcohol use: Yes     Comment: occasionally    Drug use: No    Sexual activity: Yes   Other Topics Concern    Not on file   Social History Narrative    Not on file       Review of Systems  "  Constitution: Negative for chills, fever and weakness.   Cardiovascular: Negative for claudication and leg swelling.   Respiratory: Negative for cough and shortness of breath.    Skin: Positive for dry skin and poor wound healing. Negative for itching and rash.   Musculoskeletal: Positive for arthritis, joint pain, joint swelling and myalgias. Negative for falls and muscle weakness.   Gastrointestinal: Negative for diarrhea, nausea and vomiting.   Neurological: Positive for paresthesias. Negative for numbness and tremors.   Psychiatric/Behavioral: Negative for altered mental status and hallucinations.         Objective:       Vitals:    10/11/18 1425   BP: (!) 138/40   Weight: (!) 162.8 kg (359 lb)   Height: 6' 1" (1.854 m)   PainSc: 0-No pain       Physical Exam   Constitutional:  Non-toxic appearance. He does not have a sickly appearance. No distress.   Pt. is well-developed, well-nourished, appears stated age, in no acute distress, alert and oriented x 3. No evidence of depression, anxiety, or agitation. Calm, cooperative, and communicative. Appropriate interactions and affect.   Cardiovascular:   Pulses:       Dorsalis pedis pulses are 2+ on the right side, and 2+ on the left side.        Posterior tibial pulses are 1+ on the right side, and 1+ on the left side.        Pulmonary/Chest: No respiratory distress.   Musculoskeletal:        Right ankle: He exhibits swelling. No tenderness. No lateral malleolus, no medial malleolus, no AITFL, no CF ligament, no head of 5th metatarsal and no proximal fibula tenderness found. Achilles tendon exhibits no pain, no defect and normal Zabala's test results.        Left ankle: He exhibits swelling. No tenderness. No lateral malleolus, no medial malleolus, no AITFL, no CF ligament and no posterior TFL tenderness found. Achilles tendon exhibits no pain, no defect and normal Zabala's test results.        Right foot: There is no tenderness and no bony tenderness.        " Left foot: There is no tenderness and no bony tenderness.   Decreased stride, station of gait.  apropulsive toe off.  Increased angle and base of gait.    Patient has hammertoes of digits 2-5 bilateral partially reducible without symptom today.     There is equinus deformity bilateral. There is limitation of dorsiflexion with knees extended and with knees flexed.    Shoes reveals lateral heel counter wear bilateral in athletic shoes.        Lymphadenopathy:   No lymphatic streaking    Negative lymphadenopathy bilateral popliteal fossa and tarsal tunnel.     Neurological:   Pearl-Jenn 5.07 monofilament is intact bilateral feet. Sharp/dull sensation is also intact Bilateral feet. Proprioception is grossly intact. Vibratory sensation intact (pt able to sense vibration stop within 3-5 seconds)     Skin: Skin is warm and dry. Lesion and rash noted. No bruising, no burn and no laceration noted. He is not diaphoretic. There is erythema. No cyanosis. No pallor. Nails show no clubbing.   Ulcer location: medial left ankle  Signs of infection: none  Drainage: none  Purulence: no  Crepitus/fluctuance: no  Periwound: Reddened  Base: thin epithelial itssue  Depth: skin  Probe to bone: no      Ulcer location: posterior left heel  Signs of infection:none  Drainage:  None  Periwound: intact  Base: thin epithelial with bleeding within layers    Dermatitis and erythema noted to posterior left heel   Psychiatric: His mood appears not anxious. His affect is not inappropriate. His speech is not slurred. He is not combative. He is communicative. He is attentive.   Nursing note reviewed.    10/11              09/27                  Assessment:       Encounter Diagnoses   Name Primary?    PVD (peripheral vascular disease) Yes    Pre-ulcerative calluses     Plantar fat pad atrophy          Plan:       Janusz was seen today for wound care.    Diagnoses and all orders for this visit:    PVD (peripheral vascular  disease)    Pre-ulcerative calluses    Plantar fat pad atrophy      I counseled the patient on his conditions, their implications and medical management.    The site is cleansed of foreign material as much as possible. The patient is alerted to watch for any signs of infection (redness, pus, pain, increased swelling or fever) and call if such occurs.    Heel wound has remained healed but with significant callus formation and bleeding in its layers   I advised patient to check feet daily for signs of drainage or lesion re-opening   Discussed use of daily foot moisturizer to feet, avoiding the webspace's    Left ankle wound is also healed.  Patient encouraged to moisturize skin daily    - Tubigrips to BLE; patient is to elevate legs. When sleeping, place a pillow under lower extremities. When sitting, support the legs so that they are level with the waist.    Follow-up:Patient is to return to the clinic in 4-8 weeks for follow-up but should call [facility] immediately if any signs of infection, such as fever, chills, sweats, increased redness or pain.    Short-term goals include maintaining good offloading and minimizing bioburden, promoting granulation and epithelialization to healing.  Long-term goals include keeping the wound healed by good offloading and medical management under the direction of internist.

## 2018-10-16 DIAGNOSIS — C44.310 BCC (BASAL CELL CARCINOMA), FACE: Primary | ICD-10-CM

## 2018-10-16 RX ORDER — FLUOROURACIL 50 MG/G
CREAM TOPICAL
Qty: 40 G | Refills: 1 | Status: SHIPPED | OUTPATIENT
Start: 2018-10-16 | End: 2018-12-18 | Stop reason: ALTCHOICE

## 2018-10-16 NOTE — PROGRESS NOTES
FINAL PATHOLOGIC DIAGNOSIS  1. Skin, forehead, shave biopsy:  - BASAL CELL CARCINOMA.  - THE TUMOR EXTENDS TO THE DEEP AND LATERAL BIOPSY MARGINS.  MICROSCOPIC DESCRIPTION: Sections show a basaloid tumor within the dermis exhibiting peripheral palisading,  retraction artifact and apoptosis.  2. Skin, right medial shoulder, shave biopsy:  - BASAL CELL CARCINOMA WITH MIXED SUPERFICIAL AND NODULAR GROWTH PATTERN.  - THE TUMOR EXTENDS TO A LATERAL BIOPSY MARGIN.    Spoke to pt and discussed options. Given size of forehead lesion, rec topical treatment -- sent rx for efudex bid x 1 month to both sites    F/u 3 months

## 2018-10-22 ENCOUNTER — CLINICAL SUPPORT (OUTPATIENT)
Dept: ELECTROPHYSIOLOGY | Facility: CLINIC | Age: 73
End: 2018-10-22
Attending: INTERNAL MEDICINE
Payer: MEDICARE

## 2018-10-22 DIAGNOSIS — Z95.818 STATUS POST PLACEMENT OF IMPLANTABLE LOOP RECORDER: ICD-10-CM

## 2018-10-22 DIAGNOSIS — I48.0 PAF (PAROXYSMAL ATRIAL FIBRILLATION): ICD-10-CM

## 2018-10-22 DIAGNOSIS — I48.91 ATRIAL FIBRILLATION: ICD-10-CM

## 2018-10-22 DIAGNOSIS — Z95.818 STATUS POST PLACEMENT OF IMPLANTABLE LOOP RECORDER: Primary | ICD-10-CM

## 2018-10-22 PROCEDURE — 93299 LOOP RECORDER REMOTE: CPT | Mod: PBBFAC | Performed by: INTERNAL MEDICINE

## 2018-10-22 PROCEDURE — 93298 REM INTERROG DEV EVAL SCRMS: CPT | Mod: ,,, | Performed by: INTERNAL MEDICINE

## 2018-10-23 ENCOUNTER — PATIENT OUTREACH (OUTPATIENT)
Dept: OTHER | Facility: OTHER | Age: 73
End: 2018-10-23

## 2018-10-23 NOTE — LETTER
Jocelyne Monge, PharmD  [address] [place] [place] [address]     Dear Janusz Montgomery Jr.,    Welcome to the [facility]!           My name is Jocelyne Monge PharmD and I am your dedicated Digital Medicine clinician.  As an expert in medication management, I will help ensure that the medications you are taking continue to provide you with the intended benefits.        I am Anna Lopes and I will be your health  for the duration of the program.  My  job is to help you identify lifestyle changes to improve your blood pressure control.  We will talk about nutrition, exercise, and other ways that you may be able to adjust your current habits to better your health. Together, we will work to improve your overall health and encourage you to meet your goals for a healthier lifestyle.    What we expect from YOU:    You will need to take blood pressure readings multiple times a week and no less than one reading per week.   It is important that you take your measurements at different times during the day, when possible.     What you should expect from your Digital Medicine Care Team:   We will provide you with education about high blood pressure, including lifestyle changes that could help you to control your blood pressure.   We will review your weekly readings and provide you with monthly blood pressure progress reports after you have been in the program for more than 30 days.   We will send monthly progress reports on your blood pressure control to your physician so they can follow along with your progress as well.    You will be able to reach me by phone at [phone] or through your [facility] account by clicking my name under Care Team on the right side of the home screen.    I look forward to working with you to achieve your blood pressure goals!    Sincerely,    Jocelyne Monge PharmD  Your personal clinician  Please visit  [email or web address] to learn more about high blood pressure and what you can do lower your blood pressure.                                                                                           Janusz Montgomery Jr.  [address] [facility] [address]

## 2018-10-23 NOTE — PROGRESS NOTES
Last 5 Patient Entered Readings                                      Current 30 Day Average: 131/78     Recent Readings 10/11/2018    SBP (mmHg) 131    DBP (mmHg) 78    Pulse 71          10/24: Digital Medicine: Health  Introduction Call     Left voicemail and requested call back in order to complete the hypertension digital medicine program introduction call. Will contact patient on 10/29 to complete enrollment call.

## 2018-10-24 ENCOUNTER — PATIENT MESSAGE (OUTPATIENT)
Dept: ADMINISTRATIVE | Facility: OTHER | Age: 73
End: 2018-10-24

## 2018-10-24 NOTE — PROGRESS NOTES
"Last 5 Patient Entered Readings                                      Current 30 Day Average: 131/78     Recent Readings 10/11/2018    SBP (mmHg) 131    DBP (mmHg) 78    Pulse 71        Digital Medicine: Health  Introduction    Introduced . Janusz Montgomery  to Digital Medicine. Discussed health  role and recommended lifestyle modifications.    Lifestyle Assessment:  Current Dietary Habits(i.e. low sodium, food labels, dining out): Patient reports that he is overweight and he eats "whatever he wants." He states that he stays away from red meat but eats a lot of fish and po boys. He reports not having a consistent dietary regimen. Sent patient e-mail with low sodium healthy food options.     Exercise: Patient states that his physical activity level is a "0." He reports not doing much to be physically active. Discussed the importance of exercise and set a goal with patient to try and get 150 minutes of physical activity every week as recommended by the AHA.    Alcohol/Tobacco: Patient reports not using tobacco but has an occasional drink every now and then.     Medication Adherence: has been compliant with the medicaiton regimen. Patient does not have any complaints about his BP meds at this time.      Other goals: Patient states that he would like to get off of blood pressure medication.     BP Technique: Follow the following steps when taking your blood pressure to ensure an accurate reading.  1. Be still- ensure at least 5 minutes of quiet rest before measurements. Do not talk or listen while taking the reading.  2. Don't smoke, drink caffeinated beverages or exercise within 30 minutes before measuring your blood pressure. Empty your bladder.  3. Sit correctly - sit with your back straight and supported (on a dining chair, rather than a sofa). Your feet should be flat on the floor and your legs should not be crossed. Your arm should be supported on a flat surface (such as a table) with the upper arm at " heart level. Make sure the bottom of the cuff is placed directly above the bend of the elbow.   Measure at the different times of the day, at least 45 minutes after your medications. It is best to take the readings daily.  4. Dont take the measurement over clothes.    Reviewed AHA/AACE recommendations:    Limit sodium intake to <2000mg/day    Perform 150 minutes of physical activity per week    Reviewed the importance of self-monitoring, medication adherence, and that the health  can be used as a resource for lifestyle modifications to help reduce or maintain a healthy lifestyle.    Reviewed that the Digital Medicine team is not available for emergencies and instructed the patient to call 911 or [facility] ([phone] or [phone]) if one arises.

## 2018-10-25 ENCOUNTER — TELEPHONE (OUTPATIENT)
Dept: RADIATION ONCOLOGY | Facility: CLINIC | Age: 73
End: 2018-10-25

## 2018-10-25 NOTE — TELEPHONE ENCOUNTER
----- Message from Alicia Jackson sent at 10/25/2018  3:22 PM CDT -----  Pt is returning your call. Please call at [phone].

## 2018-10-29 ENCOUNTER — TELEPHONE (OUTPATIENT)
Dept: PULMONOLOGY | Facility: CLINIC | Age: 73
End: 2018-10-29

## 2018-10-29 DIAGNOSIS — I48.0 PAROXYSMAL ATRIAL FIBRILLATION: Primary | ICD-10-CM

## 2018-10-29 DIAGNOSIS — G47.33 OSA (OBSTRUCTIVE SLEEP APNEA): Primary | ICD-10-CM

## 2018-10-29 NOTE — TELEPHONE ENCOUNTER
----- Message from Joanie Simons sent at 10/29/2018 12:09 PM CDT -----  Contact: pt            Name of Who is Calling: pt      What is the request in detail: pt wants to discuss receiving supplies earlier. Call pt       Can the clinic reply by [facility]: no      What Number to Call Back if not in [facility]: [phone]

## 2018-10-30 ENCOUNTER — HOSPITAL ENCOUNTER (OUTPATIENT)
Dept: CARDIOLOGY | Facility: CLINIC | Age: 73
Discharge: HOME | End: 2018-10-30
Payer: MEDICARE

## 2018-10-30 ENCOUNTER — IMMUNIZATION (OUTPATIENT)
Dept: PHARMACY | Facility: CLINIC | Age: 73
End: 2018-10-30
Payer: MEDICARE

## 2018-10-30 ENCOUNTER — OFFICE VISIT (OUTPATIENT)
Dept: ELECTROPHYSIOLOGY | Facility: CLINIC | Age: 73
End: 2018-10-30
Payer: MEDICARE

## 2018-10-30 ENCOUNTER — LAB VISIT (OUTPATIENT)
Dept: LAB | Facility: HOSPITAL | Age: 73
End: 2018-10-30
Attending: RADIOLOGY
Payer: MEDICARE

## 2018-10-30 VITALS
WEIGHT: 315 LBS | DIASTOLIC BLOOD PRESSURE: 78 MMHG | BODY MASS INDEX: 41.75 KG/M2 | HEIGHT: 73 IN | SYSTOLIC BLOOD PRESSURE: 134 MMHG | HEART RATE: 88 BPM

## 2018-10-30 DIAGNOSIS — C61 PROSTATE CANCER: ICD-10-CM

## 2018-10-30 DIAGNOSIS — I10 ESSENTIAL HYPERTENSION: ICD-10-CM

## 2018-10-30 DIAGNOSIS — I48.0 PAROXYSMAL ATRIAL FIBRILLATION: Primary | ICD-10-CM

## 2018-10-30 DIAGNOSIS — E66.01 MORBID OBESITY WITH BMI OF 40.0-44.9, ADULT: ICD-10-CM

## 2018-10-30 DIAGNOSIS — I48.0 PAROXYSMAL ATRIAL FIBRILLATION: ICD-10-CM

## 2018-10-30 DIAGNOSIS — I25.10 CORONARY ARTERY DISEASE INVOLVING NATIVE CORONARY ARTERY OF NATIVE HEART WITHOUT ANGINA PECTORIS: ICD-10-CM

## 2018-10-30 LAB — COMPLEXED PSA SERPL-MCNC: 0.04 NG/ML

## 2018-10-30 PROCEDURE — 3075F SYST BP GE 130 - 139MM HG: CPT | Mod: CPTII,,, | Performed by: INTERNAL MEDICINE

## 2018-10-30 PROCEDURE — 93005 ELECTROCARDIOGRAM TRACING: CPT | Mod: PBBFAC | Performed by: INTERNAL MEDICINE

## 2018-10-30 PROCEDURE — 99214 OFFICE O/P EST MOD 30 MIN: CPT | Mod: PBBFAC,25 | Performed by: INTERNAL MEDICINE

## 2018-10-30 PROCEDURE — 99999 PR PBB SHADOW E&M-EST. PATIENT-LVL IV: CPT | Mod: PBBFAC,,, | Performed by: INTERNAL MEDICINE

## 2018-10-30 PROCEDURE — 1101F PT FALLS ASSESS-DOCD LE1/YR: CPT | Mod: CPTII,,, | Performed by: INTERNAL MEDICINE

## 2018-10-30 PROCEDURE — 84153 ASSAY OF PSA TOTAL: CPT

## 2018-10-30 PROCEDURE — 36415 COLL VENOUS BLD VENIPUNCTURE: CPT

## 2018-10-30 PROCEDURE — 3078F DIAST BP <80 MM HG: CPT | Mod: CPTII,,, | Performed by: INTERNAL MEDICINE

## 2018-10-30 PROCEDURE — 93010 ELECTROCARDIOGRAM REPORT: CPT | Mod: S$PBB,,, | Performed by: INTERNAL MEDICINE

## 2018-10-30 PROCEDURE — 99213 OFFICE O/P EST LOW 20 MIN: CPT | Mod: S$PBB,,, | Performed by: INTERNAL MEDICINE
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
## 2018-10-31 ENCOUNTER — OFFICE VISIT (OUTPATIENT)
Dept: ORTHOPEDICS | Facility: CLINIC | Age: 73
End: 2018-10-31
Payer: MEDICARE

## 2018-10-31 ENCOUNTER — HOSPITAL ENCOUNTER (OUTPATIENT)
Dept: RADIOLOGY | Facility: HOSPITAL | Age: 73
Discharge: HOME | End: 2018-10-31
Attending: STUDENT IN AN ORGANIZED HEALTH CARE EDUCATION/TRAINING PROGRAM
Payer: MEDICARE

## 2018-10-31 VITALS
DIASTOLIC BLOOD PRESSURE: 81 MMHG | BODY MASS INDEX: 41.75 KG/M2 | HEIGHT: 73 IN | WEIGHT: 315 LBS | SYSTOLIC BLOOD PRESSURE: 127 MMHG | HEART RATE: 88 BPM

## 2018-10-31 DIAGNOSIS — S82.202F TYPE III OPEN FRACTURE OF LEFT TIBIA AND FIBULA WITH ROUTINE HEALING: ICD-10-CM

## 2018-10-31 DIAGNOSIS — S82.402F TYPE III OPEN FRACTURE OF LEFT TIBIA AND FIBULA WITH ROUTINE HEALING: ICD-10-CM

## 2018-10-31 DIAGNOSIS — S82.402F TYPE III OPEN FRACTURE OF LEFT TIBIA AND FIBULA WITH ROUTINE HEALING: Primary | ICD-10-CM

## 2018-10-31 DIAGNOSIS — S82.202F TYPE III OPEN FRACTURE OF LEFT TIBIA AND FIBULA WITH ROUTINE HEALING: Primary | ICD-10-CM

## 2018-10-31 PROCEDURE — 99999 PR PBB SHADOW E&M-EST. PATIENT-LVL III: CPT | Mod: PBBFAC,,, | Performed by: ORTHOPAEDIC SURGERY

## 2018-10-31 PROCEDURE — 3079F DIAST BP 80-89 MM HG: CPT | Mod: CPTII,S$GLB,, | Performed by: ORTHOPAEDIC SURGERY

## 2018-10-31 PROCEDURE — 73610 X-RAY EXAM OF ANKLE: CPT | Mod: 26,LT,, | Performed by: RADIOLOGY

## 2018-10-31 PROCEDURE — 99214 OFFICE O/P EST MOD 30 MIN: CPT | Mod: S$GLB,,, | Performed by: ORTHOPAEDIC SURGERY

## 2018-10-31 PROCEDURE — 73610 X-RAY EXAM OF ANKLE: CPT | Mod: TC,LT

## 2018-10-31 PROCEDURE — 3074F SYST BP LT 130 MM HG: CPT | Mod: CPTII,S$GLB,, | Performed by: ORTHOPAEDIC SURGERY

## 2018-10-31 PROCEDURE — 1101F PT FALLS ASSESS-DOCD LE1/YR: CPT | Mod: CPTII,S$GLB,, | Performed by: ORTHOPAEDIC SURGERY

## 2018-10-31 RX ORDER — GABAPENTIN 300 MG/1
300 CAPSULE ORAL 2 TIMES DAILY
Qty: 60 CAPSULE | Refills: 11 | Status: SHIPPED | OUTPATIENT
Start: 2018-10-31 | End: 2020-04-16 | Stop reason: SDUPTHER

## 2018-11-01 DIAGNOSIS — I10 ESSENTIAL HYPERTENSION: ICD-10-CM

## 2018-11-01 RX ORDER — RAMIPRIL 5 MG/1
5 CAPSULE ORAL DAILY
Qty: 90 CAPSULE | Refills: 0 | Status: SHIPPED | OUTPATIENT
Start: 2018-11-01 | End: 2019-01-16 | Stop reason: ALTCHOICE

## 2018-11-02 ENCOUNTER — PATIENT MESSAGE (OUTPATIENT)
Dept: ORTHOPEDICS | Facility: CLINIC | Age: 73
End: 2018-11-02

## 2018-11-05 NOTE — PROGRESS NOTES
HPI:  Mr. 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  He is doing well and still has no pain.  His heel ulcer is essentially healed at this point and he continues to ambulate with no assistive devices.   He does have one small area of irritated/abraded skin on the medial side of the ankle.  No drainage.  No constitutional symptoms.     I saw him a month ago and he'd stopped taking his neurontin.  He's had some medial sided pain near the top of the plate since that time, 2/10 at its worst with prolonged activity, relieved largely by rest.       ROS:  Patient denies constitutional symptoms, cardiac symptoms, respiratory symptoms, GI symptoms.  The remainder of the musculoskeletal ROS is included in the HPI.    PE:    AA&O x 4.  NAD  HEENT:  NCAT, sclera nonicteric  Lungs:  Respirations are equal and unlabored.  CV:  2+ bilateral upper and lower extremity pulses.    PE:  Incisions well healed throughout.  There is one dime sized medial area with very superficial skin abrasion/irritation that appears to be only epidermal.  No underlying fluctuance or surrounding erythema..  Heel ulcer essentially healed with small hardened area remaining.      CRP 9.4  ESR 43    Rads:  Hwr intact.  Good alignment.  Fibula healed and tibia with bridging bone, much more than last films.  No varus.  No change.    A/P:  1.5 years out now from revision of his fixation and he is overall doing very well.  His inflammation labs are only mildly elevated with the heel ulcer almost all the way healed.  He is seeing his wound care doctor still.   I will start his neurontin again, 300 BID which he was taking before.  He will put a little medihoney on the dry area on the medial side and let me know if he has any breakdown in that area or erythema.

## 2018-11-07 ENCOUNTER — TELEPHONE (OUTPATIENT)
Dept: FAMILY MEDICINE | Facility: CLINIC | Age: 73
End: 2018-11-07

## 2018-11-07 NOTE — TELEPHONE ENCOUNTER
----- Message from Kavita Roque sent at 11/7/2018  1:05 PM CST -----  Contact: Self   Pt calling to speak to a nurse regarding health. [phone]

## 2018-11-07 NOTE — TELEPHONE ENCOUNTER
Patient would like to see a physician regarding getting refills on several of his medications and would like to discuss his health. Patient requested to see Dr. KECIA Cannon next week since Dr. Weathers is unavailable in the near future, therefore he was scheduled for 11/15/2018 at 2:20pm.             ----- Message from Kavita Roque sent at 11/7/2018  2:35 PM CST -----  Contact: self   Pt returning call. [phone]

## 2018-11-07 NOTE — TELEPHONE ENCOUNTER
Attempted to call patient at number listed in chart. Patient did not answer. Voicemail message left for patient to return call at earliest convenience.

## 2018-11-09 DIAGNOSIS — F33.0 MAJOR DEPRESSIVE DISORDER, RECURRENT EPISODE, MILD: ICD-10-CM

## 2018-11-10 RX ORDER — VENLAFAXINE 75 MG/1
TABLET ORAL
Qty: 360 TABLET | Refills: 0 | Status: SHIPPED | OUTPATIENT
Start: 2018-11-10 | End: 2019-02-18 | Stop reason: SDUPTHER

## 2018-11-12 ENCOUNTER — OFFICE VISIT (OUTPATIENT)
Dept: RADIATION ONCOLOGY | Facility: CLINIC | Age: 73
End: 2018-11-12
Payer: MEDICARE

## 2018-11-12 VITALS
WEIGHT: 315 LBS | TEMPERATURE: 98 F | HEIGHT: 73 IN | HEART RATE: 99 BPM | BODY MASS INDEX: 41.75 KG/M2 | SYSTOLIC BLOOD PRESSURE: 160 MMHG | RESPIRATION RATE: 18 BRPM | DIASTOLIC BLOOD PRESSURE: 75 MMHG

## 2018-11-12 DIAGNOSIS — C61 PROSTATE CANCER: Primary | ICD-10-CM

## 2018-11-12 PROCEDURE — 99499 UNLISTED E&M SERVICE: CPT | Mod: S$GLB,,, | Performed by: RADIOLOGY

## 2018-11-12 PROCEDURE — 99999 PR PBB SHADOW E&M-EST. PATIENT-LVL III: CPT | Mod: PBBFAC,,, | Performed by: RADIOLOGY
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
## 2018-11-12 NOTE — Clinical Note
plan phone follow up in 2 weeks to check urine status with increased flomax.  Did it help.  Plan follow up with Dr. Minor in 4 - 6 months with psa

## 2018-11-15 ENCOUNTER — OFFICE VISIT (OUTPATIENT)
Dept: PODIATRY | Facility: CLINIC | Age: 73
End: 2018-11-15
Payer: MEDICARE

## 2018-11-15 ENCOUNTER — OFFICE VISIT (OUTPATIENT)
Dept: FAMILY MEDICINE | Facility: CLINIC | Age: 73
End: 2018-11-15
Payer: MEDICARE

## 2018-11-15 VITALS
BODY MASS INDEX: 41.75 KG/M2 | SYSTOLIC BLOOD PRESSURE: 125 MMHG | WEIGHT: 315 LBS | DIASTOLIC BLOOD PRESSURE: 87 MMHG | TEMPERATURE: 98 F | DIASTOLIC BLOOD PRESSURE: 60 MMHG | OXYGEN SATURATION: 96 % | HEART RATE: 80 BPM | WEIGHT: 315 LBS | SYSTOLIC BLOOD PRESSURE: 147 MMHG | BODY MASS INDEX: 41.75 KG/M2 | HEIGHT: 73 IN | HEIGHT: 73 IN

## 2018-11-15 DIAGNOSIS — E78.5 HYPERLIPIDEMIA, UNSPECIFIED HYPERLIPIDEMIA TYPE: ICD-10-CM

## 2018-11-15 DIAGNOSIS — Z87.2 HEALED ULCER OF LEFT FOOT ON EXAMINATION: ICD-10-CM

## 2018-11-15 DIAGNOSIS — K21.9 GASTROESOPHAGEAL REFLUX DISEASE WITHOUT ESOPHAGITIS: ICD-10-CM

## 2018-11-15 DIAGNOSIS — E66.01 MORBID OBESITY WITH BMI OF 40.0-44.9, ADULT: ICD-10-CM

## 2018-11-15 DIAGNOSIS — I10 ESSENTIAL HYPERTENSION: Primary | ICD-10-CM

## 2018-11-15 DIAGNOSIS — N40.1 BENIGN PROSTATIC HYPERPLASIA WITH URINARY OBSTRUCTION: ICD-10-CM

## 2018-11-15 DIAGNOSIS — I73.9 PVD (PERIPHERAL VASCULAR DISEASE): Primary | ICD-10-CM

## 2018-11-15 DIAGNOSIS — Z23 NEED FOR TDAP VACCINATION: ICD-10-CM

## 2018-11-15 DIAGNOSIS — L84 PRE-ULCERATIVE CALLUSES: ICD-10-CM

## 2018-11-15 DIAGNOSIS — Z12.11 COLON CANCER SCREENING: ICD-10-CM

## 2018-11-15 DIAGNOSIS — I48.0 PAROXYSMAL ATRIAL FIBRILLATION: ICD-10-CM

## 2018-11-15 DIAGNOSIS — I25.10 CORONARY ARTERY DISEASE INVOLVING NATIVE CORONARY ARTERY OF NATIVE HEART WITHOUT ANGINA PECTORIS: ICD-10-CM

## 2018-11-15 DIAGNOSIS — L90.9 PLANTAR FAT PAD ATROPHY: ICD-10-CM

## 2018-11-15 DIAGNOSIS — N13.8 BENIGN PROSTATIC HYPERPLASIA WITH URINARY OBSTRUCTION: ICD-10-CM

## 2018-11-15 PROCEDURE — 1101F PT FALLS ASSESS-DOCD LE1/YR: CPT | Mod: CPTII,S$GLB,, | Performed by: PODIATRIST

## 2018-11-15 PROCEDURE — 99213 OFFICE O/P EST LOW 20 MIN: CPT | Mod: S$GLB,,, | Performed by: PODIATRIST

## 2018-11-15 PROCEDURE — 3079F DIAST BP 80-89 MM HG: CPT | Mod: CPTII,S$GLB,, | Performed by: PODIATRIST

## 2018-11-15 PROCEDURE — 3078F DIAST BP <80 MM HG: CPT | Mod: CPTII,S$GLB,, | Performed by: FAMILY MEDICINE

## 2018-11-15 PROCEDURE — 99999 PR PBB SHADOW E&M-EST. PATIENT-LVL III: CPT | Mod: PBBFAC,,, | Performed by: PODIATRIST

## 2018-11-15 PROCEDURE — 1101F PT FALLS ASSESS-DOCD LE1/YR: CPT | Mod: CPTII,S$GLB,, | Performed by: FAMILY MEDICINE

## 2018-11-15 PROCEDURE — 99999 PR PBB SHADOW E&M-EST. PATIENT-LVL III: CPT | Mod: PBBFAC,,, | Performed by: FAMILY MEDICINE

## 2018-11-15 PROCEDURE — 3074F SYST BP LT 130 MM HG: CPT | Mod: CPTII,S$GLB,, | Performed by: PODIATRIST

## 2018-11-15 PROCEDURE — 99214 OFFICE O/P EST MOD 30 MIN: CPT | Mod: S$GLB,,, | Performed by: FAMILY MEDICINE

## 2018-11-15 PROCEDURE — 3074F SYST BP LT 130 MM HG: CPT | Mod: CPTII,S$GLB,, | Performed by: FAMILY MEDICINE

## 2018-11-15 RX ORDER — ATORVASTATIN CALCIUM 40 MG/1
40 TABLET, FILM COATED ORAL DAILY
Qty: 90 TABLET | Refills: 1 | Status: SHIPPED | OUTPATIENT
Start: 2018-11-15 | End: 2019-05-13 | Stop reason: SDUPTHER

## 2018-11-15 NOTE — PROGRESS NOTES
Office Visit    Patient Name: Janusz Montgomery Jr.    : 1945  MRN: [id number]      Assessment/Plan:  Janusz Montgomery Jr. is a 73 y.o. male who presents today for :    Essential hypertension  Morbid obesity with BMI of 40.0-44.9, adult  - continue current medications   - ?advised DASH diet, portion control, regular cardiovascular exercises  - ?counseled on weight loss    Hyperlipidemia, unspecified hyperlipidemia type  -     atorvastatin (LIPITOR) 40 MG tablet; Take 1 tablet (40 mg total) by mouth once daily.  Dispense: 90 tablet; Refill: 1  -continue statin   -advised low-fat diet, regular exercise, and weight loss.     Paroxysmal atrial fibrillation   Coronary artery disease involving native coronary artery of native heart without angina pectoris s/p RCA stent  -stable, continue statin, ASA, Eliquis  -caution on bleeding risks  -continue ILR monitoring   -f/u with Cardiology as needed      Colon cancer screening  -     Case request GI: COLONOSCOPY    Gastroesophageal reflux disease without esophagitis  -stable, continue with antacid and avoid triggers    Benign prostatic hyperplasia with urinary obstruction  -stable, continue current med    Need for Tdap vaccination  -patient declined flu vaccine today        Follow-up in about 6 months (around 5/15/2019).     This note was created by combination of typed  and Dragon dictation.  Transcription errors may be present.  If there are any questions, please contact me.      ----------------------------------------------------------------------------------------------------------------------      HPI:  Patient Care Team:  Miguelina Weathers MD as PCP - General (Internal Medicine)  Luis Palacio MD as Consulting Physician (Cardiology)  Colt Sosa MD as Consulting Physician (Plastic Surgery)  AURELIANO Minor MD as Consulting Physician (Urology)  Walter Cortés MD as Consulting Physician (Orthopedic Surgery)  Miguelina Weathers MD as  Hypertension Digital Medicine Responsible Provider (Internal Medicine)  Jocelyne Monge, PharmD as Hypertension Digital Medicine Clinician (Pharmacist)  Miguelina Kitchen as Digital Medicine Health     Janusz is a 73 y.o. male with      Patient Active Problem List   Diagnosis    Essential hypertension    Hyperlipidemia    Coronary artery disease involving native coronary artery of native heart without angina pectoris s/p RCA stent    Obstructive sleep apnea on CPAP    GERD (gastroesophageal reflux disease)    Vitamin D deficiency disease    Benign prostatic hyperplasia with urinary obstruction    Morbid obesity with BMI of 40.0-44.9, adult    Prostate cancer    Type III open fracture of left tibia and fibula with routine healing    Paroxysmal atrial fibrillation     Major depressive disorder, recurrent episode, mild    Generalized anxiety disorder    History of gout    Herpes simplex keratoconjunctivitis    Decreased range of motion of ankle    Restriction of joint motion    Heel cord tightness, right    Right leg weakness    Impaired mobility    Balance problems    PVD (peripheral vascular disease)     This patient is known to me and to this clinic. The patient's last visit with me was on 7/26/2018.    Patient presents today for f/u :  Follow-up    HTN - compliant with meds as prescribed, denies any side effects.  120-130s/60s-80s per BP log at home.  No vision changes/urinary changes/leg swelling. He has occasional mild SOB - last saw Cardiology two weeks ago for pAFib and ILR monitoring (implanted 4/7/17). Still on Eliquis daily without any issues, no gum bleeding/GI bleeds. HLD - tolerating statin well - needs refills today.  GERD - doing well on PPI, tries to avoid food triggers. No abd pain or epigastric tenderness/sour taste/chronic coughs. He desires to get Colonoscopy referral today.             Additional ROS    No F/C/wt changes/fatigue  No dysphagia/sore throat  No  CP/SOB/palpitations/swelling  No cough/wheezing  No nausea/vomiting/abd pain/no diarrhea, no constipation, no blood in stool  No muscle aches/joint pain  No rashes  No weakness/HA/tingling/numbness  No anxiety/depression  No dysuria/hematuria      Patient Active Problem List   Diagnosis    Essential hypertension    Hyperlipidemia    Coronary artery disease involving native coronary artery of native heart without angina pectoris s/p RCA stent    Obstructive sleep apnea on CPAP    GERD (gastroesophageal reflux disease)    Vitamin D deficiency disease    Benign prostatic hyperplasia with urinary obstruction    Morbid obesity with BMI of 40.0-44.9, adult    Prostate cancer    Type III open fracture of left tibia and fibula with routine healing    Paroxysmal atrial fibrillation     Major depressive disorder, recurrent episode, mild    Generalized anxiety disorder    History of gout    Herpes simplex keratoconjunctivitis    Decreased range of motion of ankle    Restriction of joint motion    Heel cord tightness, right    Right leg weakness    Impaired mobility    Balance problems    PVD (peripheral vascular disease)       Current Medications  Medications reviewed/updated.     Current Outpatient Medications on File Prior to Visit   Medication Sig Dispense Refill    acyclovir (ZOVIRAX) 800 MG Tab Take 1 tablet (800 mg total) by mouth 2 (two) times daily. 180 tablet 6    amoxicillin (AMOXIL) 500 MG capsule TAKE 2 CAPSULES BY MOUTH STAT, THEN 1 CAPSULE 4 TIMES DAILY UNTIL GONE  0    apixaban 5 mg Tab Take 1 tablet (5 mg total) by mouth 2 (two) times daily. 60 tablet 11    artificial tears (ISOPTO TEARS) 0.5 % ophthalmic solution Place 1 drop into both eyes as needed. (Patient taking differently: Place 1 drop into both eyes as needed (for dry eyes). )      aspirin (ECOTRIN) 81 MG EC tablet Take 1 tablet (81 mg total) by mouth once daily.  0    CYANOCOBALAMIN, VITAMIN B-12, (VITAMIN B-12 ORAL) Take  2,500 mcg by mouth once daily.      fluorouracil (EFUDEX) 5 % cream AAA bid x 4 weeks right neck and forehead 40 g 1    furosemide (LASIX) 20 MG tablet Take 1 tablet (20 mg total) by mouth once daily. 90 tablet 0    gabapentin (NEURONTIN) 300 MG capsule TAKE ONE CAPSULE BY MOUTH THREE TIMES DAILY 90 capsule 11    gabapentin (NEURONTIN) 300 MG capsule Take 1 capsule (300 mg total) by mouth 2 (two) times daily. 60 capsule 11    honey (MEDIHONEY, HONEY,) 100 % Pste Apply 1 application topically 2 (two) times daily as needed. 1 Tube 3    honey (MEDIHONEY, HONEY,) 100 % Pste Apply 1 application topically 2 (two) times daily. 1 Tube 1    influenza (FLUZONE HIGH-DOSE) 180 mcg/0.5 mL vaccine Inject into the muscle. 0.5 mL 0    melatonin 3 mg Tab Take 6 mg by mouth nightly as needed (for sleep).       multivitamin (MULTIPLE VITAMINS) per tablet Take 1 tablet by mouth once daily.      omeprazole (PRILOSEC) 20 MG capsule Take 1 capsule (20 mg total) by mouth daily as needed. 90 capsule 0    ramipril (ALTACE) 5 MG capsule Take 1 capsule (5 mg total) by mouth once daily. 90 capsule 0    sildenafil (VIAGRA) 100 MG tablet Take 1 tablet (100 mg total) by mouth daily as needed for Erectile Dysfunction. 10 tablet 11    tamsulosin (FLOMAX) 0.4 mg Cp24 TAKE ONE CAPSULE BY MOUTH ONCE DAILY 90 capsule 3    venlafaxine (EFFEXOR) 75 MG tablet TAKE TWO TABLETS BY MOUTH TWICE DAILY 360 tablet 0    vitamin D 1000 units Tab Take 1 tablet (1,000 Units total) by mouth once daily.      zinc sulfate (ZINCATE) 220 (50) mg capsule Take 1 capsule (220 mg total) by mouth once daily.      [DISCONTINUED] atorvastatin (LIPITOR) 40 MG tablet TAKE 1 TABLET BY MOUTH ONCE DAILY 90 tablet 0     No current facility-administered medications on file prior to visit.            Past Surgical History:   Procedure Laterality Date    APPLICATION-EXTERNAL FIXATION DEVICE, ankle Left 12/28/2016    Performed by Walter Cortés MD at [facility] OR [address] [place] [address]     Cardiac stenting x2      CATARACT EXTRACTION W/  INTRAOCULAR LENS IMPLANT Right 3/29/2016    Dr. Conteh    CATARACT EXTRACTION W/  INTRAOCULAR LENS IMPLANT Left 4/12/2016        DEBRIDEMENT-FOOT Left 4/13/2017    Performed by Walter [facility] at [facility] OR [facility]    EXTERNAL FIXATION TIBIAL FRACTURE Left 07/01/2016    FULL THICKNESS SKIN GRAFT from left shoulder Left 12/8/2015    Performed by Colt Sosa MD at [facility] OR [facility]    INSERTION, RADIOACTIVE SEED, PROSTATE N/A 8/8/2018    Performed by Bipin Thompson MD at [facility] OR [facility]    INSERTION-INTRAOCULAR LENS (IOL) Left 4/12/2016    Performed by Pool Conteh MD at [facility] OR [facility]    INSERTION-INTRAOCULAR LENS (IOL) Right 3/29/2016    [facility]    MAPPING-ULTRASOUND  6/13/2018    [facility]    OPEN REDUCTION INTERNAL [id number] Left 4/13/2017    Performed by Walter Cort[facility] MD at [facility] OR [facility]    OPEN REDUCTION INTERNAL FIXATION-TIBIA Left 7/15/2016    Performed by Walter Cort[facility] MD at [facility] OR [facility]    ORIF TIBIA FRACTURE Left 07/15/2016    PHACOEMULSIFICATION-ASPIRATION-CATARACT Left 4/12/2016    Performed by Pool Conteh MD at [facility] OR [facility]    PHACOEMULSIFICATION-ASPIRATION-CATARACT Right 3/29/2016    Performed by Pool Conteh MD at [facility] OR [facility]    RADIOACTIVE SEED IMPLANTATION OF PROSTATE N/A 8/8/2018    Procedure: INSERTION, RADIOACTIVE SEED, PROSTATE;  Surgeon: Bipin Thompson MD;  Location: [facility] OR [address] [place] [address];  Service: Urology;  Laterality: N/A;  1 hour    REDUCTION-CLOSED-TIBIA Left 12/28/2016    Performed by Walter Cort[facility] MD at [facility] OR [facility]    REMOVAL OF EXTERNAL FIXATION DEVICE Left 7/15/2016    Performed by [facility] at [facility]    REMOVAL OF EXTERNAL FIXATION DEVICE, LEFT ANKLE C ARM Left 4/13/2017    Performed by [facility]  at [facility] OR [facility]    REMOVAL-HARDWARE-LEG Left 4/13/2017    Performed by [facility] at [facility] OR [facility]    REPAIR-MOHS DEFECT Basal Cell Left Lateral Eyebrow Left 12/8/2015    Performed by [facility]t [facility] at [facility] OR [facility]    Squamous cell cancer removal x3 with Mohs surgery      TONSILLECTOMY      TOTAL KNEE ARTHROPLASTY  10/2012    trus/bx         Family History   Problem Relation Age of Onset    Skin cancer Father     Lung cancer Father     Cancer Father         smoker,     Alzheimer's disease Mother     Hypertension Mother     Cancer Sister         colon, lung cancer     Cancer Brother         skin cancer, polypectomy     Peripheral vascular disease Unknown     Melanoma Neg Hx     Psoriasis Neg Hx     Lupus Neg Hx     Eczema Neg Hx     Amblyopia Neg Hx     Blindness Neg Hx     Cataracts Neg Hx     Diabetes Neg Hx     Glaucoma Neg Hx     Macular degeneration Neg Hx     Retinal detachment Neg Hx     Strabismus Neg Hx     Stroke Neg Hx     Thyroid disease Neg Hx     Acne Neg Hx        Social History     Socioeconomic History    Marital status:      Spouse name: Not on file    Number of children: Not on file    Years of education: Not on file    Highest education level: Not on file   Social Needs    Financial resource strain: Not on file    Food insecurity - worry: Not on file    Food insecurity - inability: Not on file    Transportation needs - medical: Not on file    Transportation needs - non-medical: Not on file   Occupational History    Occupation: Retired    Tobacco Use    Smoking status: Never Smoker    Smokeless tobacco: Never Used   Substance and Sexual Activity    Alcohol use: Yes     Comment: occasionally    Drug use: No    Sexual activity: Yes   Other Topics Concern    Not on file   Social History Narrative    Not on file             Allergies   Review of patient's allergies indicates:   Allergen Reactions    Ciprofloxacin Rash  "    Diffuse pruritic morbilliform rash developed 3/15/2017 after dose of cipro; previously in 2/2017 he had rash/fevers after initiation of cipro    Zosyn [piperacillin-tazobactam] Anaphylaxis     Diffuse pruritic morbilliform rash developed 3/15/2017.  Then, 430am dose on 3/16 and rash worsened with SOB/tachypnea but no hypoxemia.     Bacitracin Itching and Rash     Violaceous rash in area of topical Tx.              Review of Systems  See HPI      Physical Exam  /60   Pulse 80   Temp 98.2 °F (36.8 °C) (Oral)   Ht 6' 1" (1.854 m)   Wt (!) 165.4 kg (364 lb 10.3 oz)   SpO2 96%   BMI 48.11 kg/m²     GEN: NAD, well developed, pleasant, well nourished  HEENT: NCAT, PERRLA, EOMI, sclera clear, anicteric, O/P clear, MMM with no lesions  NECK: normal, supple with midline trachea, no LAD, no thyromegaly, no JVD  LUNGS: CTAB, no w/r/r, no increased work of breathing   HEART: RRR, normal S1 and S2, no m/r/g, no edema  ABD: s/nt/nd, NABS  SKIN: normal turgor, no rashes  PSYCH: AOx3, appropriate mood and affect  MSK: warm/well perfused, normal ROM in all extremities, no c/c/e.        "

## 2018-11-16 ENCOUNTER — OFFICE VISIT (OUTPATIENT)
Dept: FAMILY MEDICINE | Facility: CLINIC | Age: 73
End: 2018-11-16
Payer: MEDICARE

## 2018-11-16 VITALS
WEIGHT: 315 LBS | TEMPERATURE: 98 F | OXYGEN SATURATION: 96 % | BODY MASS INDEX: 41.75 KG/M2 | HEART RATE: 97 BPM | HEIGHT: 73 IN | SYSTOLIC BLOOD PRESSURE: 128 MMHG | DIASTOLIC BLOOD PRESSURE: 66 MMHG

## 2018-11-16 DIAGNOSIS — G47.33 OBSTRUCTIVE SLEEP APNEA ON CPAP: ICD-10-CM

## 2018-11-16 DIAGNOSIS — R60.0 BILATERAL LEG EDEMA: ICD-10-CM

## 2018-11-16 DIAGNOSIS — E78.5 HYPERLIPIDEMIA, UNSPECIFIED HYPERLIPIDEMIA TYPE: ICD-10-CM

## 2018-11-16 DIAGNOSIS — Z23 NEED FOR SHINGLES VACCINE: ICD-10-CM

## 2018-11-16 DIAGNOSIS — Z23 NEED FOR TDAP VACCINATION: ICD-10-CM

## 2018-11-16 DIAGNOSIS — I73.9 PVD (PERIPHERAL VASCULAR DISEASE): ICD-10-CM

## 2018-11-16 DIAGNOSIS — I10 ESSENTIAL HYPERTENSION: ICD-10-CM

## 2018-11-16 DIAGNOSIS — F33.0 MAJOR DEPRESSIVE DISORDER, RECURRENT EPISODE, MILD: ICD-10-CM

## 2018-11-16 DIAGNOSIS — I48.0 PAROXYSMAL ATRIAL FIBRILLATION: ICD-10-CM

## 2018-11-16 DIAGNOSIS — I25.10 CORONARY ARTERY DISEASE INVOLVING NATIVE CORONARY ARTERY OF NATIVE HEART WITHOUT ANGINA PECTORIS: ICD-10-CM

## 2018-11-16 DIAGNOSIS — E66.01 MORBID OBESITY WITH BMI OF 40.0-44.9, ADULT: ICD-10-CM

## 2018-11-16 DIAGNOSIS — C61 PROSTATE CANCER: ICD-10-CM

## 2018-11-16 DIAGNOSIS — R06.02 SHORTNESS OF BREATH: Primary | ICD-10-CM

## 2018-11-16 DIAGNOSIS — Z12.11 COLON CANCER SCREENING: ICD-10-CM

## 2018-11-16 PROBLEM — R26.89 BALANCE PROBLEMS: Status: RESOLVED | Noted: 2017-11-14 | Resolved: 2018-11-16

## 2018-11-16 PROBLEM — Z74.09 IMPAIRED MOBILITY: Status: RESOLVED | Noted: 2017-11-14 | Resolved: 2018-11-16

## 2018-11-16 PROBLEM — I65.23 BILATERAL CAROTID ARTERY STENOSIS: Status: ACTIVE | Noted: 2018-11-16

## 2018-11-16 PROCEDURE — 3078F DIAST BP <80 MM HG: CPT | Mod: CPTII,S$GLB,, | Performed by: INTERNAL MEDICINE

## 2018-11-16 PROCEDURE — 99999 PR PBB SHADOW E&M-EST. PATIENT-LVL V: CPT | Mod: PBBFAC,,, | Performed by: INTERNAL MEDICINE

## 2018-11-16 PROCEDURE — 1101F PT FALLS ASSESS-DOCD LE1/YR: CPT | Mod: CPTII,S$GLB,, | Performed by: INTERNAL MEDICINE

## 2018-11-16 PROCEDURE — 3074F SYST BP LT 130 MM HG: CPT | Mod: CPTII,S$GLB,, | Performed by: INTERNAL MEDICINE

## 2018-11-16 PROCEDURE — 99215 OFFICE O/P EST HI 40 MIN: CPT | Mod: S$GLB,,, | Performed by: INTERNAL MEDICINE

## 2018-11-16 NOTE — PROGRESS NOTES
Subjective:      Patient ID: Janusz Montgomery Jr. is a 73 y.o. male.    Chief Complaint: Wound Care (left heel PCP Dr Weathers)    Janusz Montgomery Jr. is a 73 y.o. male returns to clinic for follow up of left foot and ankle ulcers. Patient's wife has been inspecting foot daily.  He has been caring for all lesions as instructed but admits that he has not been moisturizing skin daily.  He relates all ulcers have  healed.  No new pedal complaints    Shoe gear:  Tennis shoes    Chief Complaint   Patient presents with    Wound Care     left heel PCP Dr Weathers       Hemoglobin A1C   Date Value Ref Range Status   07/11/2016 5.3 4.5 - 6.2 % Final     Comment:     According to ADA guidelines, hemoglobin A1C <7.0% represents  optimal control in non-pregnant diabetic patients.  Different  metrics may apply to specific populations.   Standards of Medical Care in Diabetes - 2016.  For the purpose of screening for the presence of diabetes:  <5.7%     Consistent with the absence of diabetes  5.7-6.4%  Consistent with increasing risk for diabetes   (prediabetes)  >or=6.5%  Consistent with diabetes  Currently no consensus exists for use of hemoglobin A1C  for diagnosis of diabetes for children.     03/31/2015 5.3 4.5 - 6.2 % Final   03/03/2014 5.6 4.5 - 6.2 % Final         Current Outpatient Medications on File Prior to Visit   Medication Sig Dispense Refill    acyclovir (ZOVIRAX) 800 MG Tab Take 1 tablet (800 mg total) by mouth 2 (two) times daily. 180 tablet 6    amoxicillin (AMOXIL) 500 MG capsule TAKE 2 CAPSULES BY MOUTH STAT, THEN 1 CAPSULE 4 TIMES DAILY UNTIL GONE  0    apixaban 5 mg Tab Take 1 tablet (5 mg total) by mouth 2 (two) times daily. 60 tablet 11    artificial tears (ISOPTO TEARS) 0.5 % ophthalmic solution Place 1 drop into both eyes as needed. (Patient taking differently: Place 1 drop into both eyes as needed (for dry eyes). )      aspirin (ECOTRIN) 81 MG EC tablet Take 1 tablet (81 mg total) by mouth once  daily.  0    CYANOCOBALAMIN, VITAMIN B-12, (VITAMIN B-12 ORAL) Take 2,500 mcg by mouth once daily.      fluorouracil (EFUDEX) 5 % cream AAA bid x 4 weeks right neck and forehead 40 g 1    furosemide (LASIX) 20 MG tablet Take 1 tablet (20 mg total) by mouth once daily. 90 tablet 0    gabapentin (NEURONTIN) 300 MG capsule TAKE ONE CAPSULE BY MOUTH THREE TIMES DAILY 90 capsule 11    gabapentin (NEURONTIN) 300 MG capsule Take 1 capsule (300 mg total) by mouth 2 (two) times daily. 60 capsule 11    honey (MEDIHONEY, HONEY,) 100 % Pste Apply 1 application topically 2 (two) times daily as needed. 1 Tube 3    honey (MEDIHONEY, HONEY,) 100 % Pste Apply 1 application topically 2 (two) times daily. 1 Tube 1    influenza (FLUZONE HIGH-DOSE) 180 mcg/0.5 mL vaccine Inject into the muscle. 0.5 mL 0    melatonin 3 mg Tab Take 6 mg by mouth nightly as needed (for sleep).       multivitamin (MULTIPLE VITAMINS) per tablet Take 1 tablet by mouth once daily.      omeprazole (PRILOSEC) 20 MG capsule Take 1 capsule (20 mg total) by mouth daily as needed. 90 capsule 0    ramipril (ALTACE) 5 MG capsule Take 1 capsule (5 mg total) by mouth once daily. 90 capsule 0    sildenafil (VIAGRA) 100 MG tablet Take 1 tablet (100 mg total) by mouth daily as needed for Erectile Dysfunction. 10 tablet 11    tamsulosin (FLOMAX) 0.4 mg Cp24 TAKE ONE CAPSULE BY MOUTH ONCE DAILY 90 capsule 3    venlafaxine (EFFEXOR) 75 MG tablet TAKE TWO TABLETS BY MOUTH TWICE DAILY 360 tablet 0    vitamin D 1000 units Tab Take 1 tablet (1,000 Units total) by mouth once daily.      zinc sulfate (ZINCATE) 220 (50) mg capsule Take 1 capsule (220 mg total) by mouth once daily.      [DISCONTINUED] atorvastatin (LIPITOR) 40 MG tablet TAKE 1 TABLET BY MOUTH ONCE DAILY 90 tablet 0     No current facility-administered medications on file prior to visit.        Allergies   Allergen Reactions    Ciprofloxacin Rash     Diffuse pruritic morbilliform rash developed  3/15/2017 after dose of cipro; previously in 2/2017 he had rash/fevers after initiation of cipro    Zosyn [Piperacillin-Tazobactam] Anaphylaxis     Diffuse pruritic morbilliform rash developed 3/15/2017.  Then, 430am dose on 3/16 and rash worsened with SOB/tachypnea but no hypoxemia.     Bacitracin Itching and Rash     Violaceous rash in area of topical Tx.        Past Surgical History:   Procedure Laterality Date    APPLICATION-EXTERNAL FIXATION DEVICE, ankle Left 12/28/2016    Performed by [facility] at [facility]    Cardiac stenting x2      CATARACT EXTRACTION W/  INTRAOCULAR LENS IMPLANT Right 3/29/2016    Dr. Conteh    CATARACT EXTRACTION W/  INTRAOCULAR LENS IMPLANT Left 4/12/2016        DEBRIDEMENT-FOOT Left 4/13/2017    Performed by [facility] at [facility]    EXTERNAL FIXATION TIBIAL FRACTURE Left 07/01/2016    FULL THICKNESS SKIN GRAFT from left shoulder Left 12/8/2015    Performed by Colt Sosa [facility] at [facility]    INSERTION, RADIOACTIVE SEED, PROSTATE N/A 8/8/2018    Performed by Bipin Thompson [facility] at [facility]    INSERTION-INTRAOCULAR LENS (IOL) Left 4/12/2016    Performed by Pool Conteh [facility] at [facility]    INSERTION-INTRAOCULAR LENS (IOL) Right 3/29/2016    Performed by Pool [facility]h [facility] at [facility]    MAPPING-ULTRASOUND  6/13/2018    Performed by Bipin Thompson [facility] at [facility]    OPEN REDUCTION INTERNAL [id number] Left 4/13/2017    Performed by [facility] at [facility]    OPEN REDUCTION INTERNAL FIXATION-TIBIA Left 7/15/2016    Performed by [facility] at [facility]    ORIF TIBIA FRACTURE Left 07/15/2016    PHACOEMULSIFICATION-ASPIRATION-CATARACT Left 4/12/2016    Performed by Pool [facility]teh [facility] at [facility]    PHACOEMULSIFICATION-ASPIRATION-CATARACT Right 3/29/2016    Performed by Pool [facility]teh [facility] at [facility]  [facility]    RADIOACTIVE SEED IMPLANTATION OF PROSTATE N/A 8/8/2018    Procedure: INSERTION, RADIOACTIVE SEED, PROSTATE;  Surgeon: Bipin Thompson MD;  [facility];  Service: Urology;  Laterality: N/A;  1 hour    REDUCTION-CLOSED-TIBIA Left 12/28/2016    Performed by [facility] at [facility]    REMOVAL OF EXTERNAL FIXATION DEVICE Left 7/15/2016    Performed by [facility] at [facility]    REMOVAL OF EXTERNAL FIXATION DEVICE, LEFT ANKLE C ARM Left 4/13/2017    Performed by [facility] at [facility]    REMOVAL-HARDWARE-LEG Left 4/13/2017    Performed by [facility] at [facility]    REPAIR-MOHS DEFECT Basal Cell Left Lateral Eyebrow Left 12/8/2015    Performed by Colt Sosa [facility] at [facility]    Squamous cell cancer removal x3 with Mohs surgery      TONSILLECTOMY      TOTAL KNEE ARTHROPLASTY  10/2012    trus/bx         Family History   Problem Relation Age of Onset    Skin cancer Father     Lung cancer Father     Cancer Father         smoker,     Alzheimer's disease Mother     Hypertension Mother     Cancer Sister         colon, lung cancer     Cancer Brother         skin cancer, polypectomy     Peripheral vascular disease Unknown     Melanoma Neg Hx     Psoriasis Neg Hx     Lupus Neg Hx     Eczema Neg Hx     Amblyopia Neg Hx     Blindness Neg Hx     Cataracts Neg Hx     Diabetes Neg Hx     Glaucoma Neg Hx     Macular degeneration Neg Hx     Retinal detachment Neg Hx     Strabismus Neg Hx     Stroke Neg Hx     Thyroid disease Neg Hx     Acne Neg Hx        Social History     Socioeconomic History    Marital status:      Spouse name: Not on file    Number of children: Not on file    Years of education: Not on file    Highest education level: Not on file   Social Needs    Financial resource strain: Not on file    Food insecurity - worry: Not on file    Food insecurity - inability: Not on file  "   Transportation needs - medical: Not on file    Transportation needs - non-medical: Not on file   Occupational History    Occupation: Retired    Tobacco Use    Smoking status: Never Smoker    Smokeless tobacco: Never Used   Substance and Sexual Activity    Alcohol use: Yes     Comment: occasionally    Drug use: No    Sexual activity: Yes   Other Topics Concern    Not on file   Social History Narrative    Not on file       Review of Systems   Constitution: Negative for chills, fever and weakness.   Cardiovascular: Negative for claudication and leg swelling.   Respiratory: Negative for cough and shortness of breath.    Skin: Positive for dry skin and poor wound healing. Negative for itching and rash.   Musculoskeletal: Positive for arthritis, joint pain, joint swelling and myalgias. Negative for falls and muscle weakness.   Gastrointestinal: Negative for diarrhea, nausea and vomiting.   Neurological: Positive for paresthesias. Negative for numbness and tremors.   Psychiatric/Behavioral: Negative for altered mental status and hallucinations.         Objective:       Vitals:    11/15/18 1448   BP: (!) 147/87   Weight: (!) 165.1 kg (364 lb)   Height: 6' 1" (1.854 m)   PainSc: 0-No pain       Physical Exam   Constitutional:  Non-toxic appearance. He does not have a sickly appearance. No distress.   Pt. is well-developed, well-nourished, appears stated age, in no acute distress, alert and oriented x 3. No evidence of depression, anxiety, or agitation. Calm, cooperative, and communicative. Appropriate interactions and affect.   Cardiovascular:   Pulses:       Dorsalis pedis pulses are 2+ on the right side, and 2+ on the left side.        Posterior tibial pulses are 1+ on the right side, and 1+ on the left side.        Pulmonary/Chest: No respiratory distress.   Musculoskeletal:        Right ankle: He exhibits swelling. No tenderness. No lateral malleolus, no medial malleolus, no AITFL, no CF ligament, no head of " 5th metatarsal and no proximal fibula tenderness found. Achilles tendon exhibits no pain, no defect and normal Zabala's test results.        Left ankle: He exhibits swelling. No tenderness. No lateral malleolus, no medial malleolus, no AITFL, no CF ligament and no posterior TFL tenderness found. Achilles tendon exhibits no pain, no defect and normal Zabala's test results.        Right foot: There is no tenderness and no bony tenderness.        Left foot: There is no tenderness and no bony tenderness.   Decreased stride, station of gait.  apropulsive toe off.  Increased angle and base of gait.    Patient has hammertoes of digits 2-5 bilateral partially reducible without symptom today.     There is equinus deformity bilateral. There is limitation of dorsiflexion with knees extended and with knees flexed.    Shoes reveals lateral heel counter wear bilateral in athletic shoes.        Lymphadenopathy:   No lymphatic streaking    Negative lymphadenopathy bilateral popliteal fossa and tarsal tunnel.     Neurological:   Hopewell-Jenn 5.07 monofilament is intact bilateral feet. Sharp/dull sensation is also intact Bilateral feet. Proprioception is grossly intact. Vibratory sensation intact (pt able to sense vibration stop within 3-5 seconds)     Skin: Skin is warm and dry. Lesion and rash noted. No bruising, no burn and no laceration noted. He is not diaphoretic. There is erythema. No cyanosis. No pallor. Nails show no clubbing.   Ulcer location: medial left ankle  Signs of infection: none  Drainage: none  Purulence: no  Crepitus/fluctuance: no  Periwound: Reddened  Base: thin epithelial itssue  Depth: skin  Probe to bone: no      Ulcer location: posterior left heel  Signs of infection:none  Drainage:  None  Periwound: intact  Base: thin epithelial with bleeding within layers    Dermatitis and erythema noted to posterior left heel   Psychiatric: His mood appears not anxious. His affect is not inappropriate. His speech  is not slurred. He is not combative. He is communicative. He is attentive.   Nursing note reviewed.        Assessment:       Encounter Diagnoses   Name Primary?    PVD (peripheral vascular disease) Yes    Pre-ulcerative calluses     Plantar fat pad atrophy     Healed ulcer of left foot on examination          Plan:       Janusz was seen today for wound care.    Diagnoses and all orders for this visit:    PVD (peripheral vascular disease)    Pre-ulcerative calluses    Plantar fat pad atrophy    Healed ulcer of left foot on examination      I counseled the patient on his conditions, their implications and medical management.    The site is cleansed of foreign material as much as possible. The patient is alerted to watch for any signs of infection (redness, pus, pain, increased swelling or fever) and call if such occurs.    All wounds have has remained healed but with significant callus formation and bleeding in the layer of left heel. I advised patient to check feet daily for signs of drainage or lesion re-opening   Discussed use of daily foot moisturizer to feet, avoiding the webspace's    Patient is to elevate legs. When sleeping, place a pillow under lower extremities. When sitting, support the legs so that they are level with the waist.    Follow-up:Patient is to return to the clinic in 9-12 weeks for follow-up but should call [facility] immediately if any signs of infection, such as fever, chills, sweats, increased redness or pain.    Short-term goals include maintaining good offloading and minimizing bioburden, promoting granulation and epithelialization to healing.  Long-term goals include keeping the wound healed by good offloading and medical management under the direction of internist.

## 2018-11-16 NOTE — PROGRESS NOTES
HISTORY OF PRESENT ILLNESS:  Janusz Montgomery Jr. is a 73 y.o. male who presents to the clinic today for Shortness of Breath; Hypertension; and Hot Flashes  .   The patient presents to clinic today for follow-up of his chronic medical conditions which include hypertension/coronary artery disease and atrial fibrillation, hyperlipidemia, and obstructive sleep apnea.  He has been having some pain in his leg which had the previous fracture.  He will start some physical therapy soon.  He is most concerned about his current shortness of breath which occurs even with simple tasks such as taking a shower going to the bathroom.  He did see Cardiology recently regarding his atrial fibrillation any appears to be in sinus rhythm. He feels like his symptoms has worsened since he saw them about a month ago.  He is status post brachytherapy in August of this year for prostate cancer.  Follow-up PSA counts have been undetectable.  He does have problems with frequent urination and postvoid dribbling.  He admits to being almost completely sedentary and only occasionally leaving his house to go out to eat but otherwise just sits in his chair behind his computer.  He is seeing vascular for his peripheral vascular disease. He has chronic lower extremity edema. This is worse when he eats too much salt.  He denies actual cardiac chest pain. No dysuria.  Hypertension: The patient reports that they check their blood pressures - not done The patient  is enrolled and active in the digital hypertension program. The patient denies  cardiac chest pain, headaches and side effects of medication. The patient reports problems with  shortness of breath and lower extremity edema. The patient  has been compliant with the current medication regimen.  The patient : tries to follow a low salt diet.  Hyperlipidemia: Patient reports that they have been compliant with low fat diet and regular exercise. Patient reports that they Have been compliant with their  "medication regimen but admits to not exercising at all.        PAST MEDICAL HISTORY:  Past Medical History:   Diagnosis Date    NAT (acute kidney injury) 3/19/2017    ALLERGIC RHINITIS     Anemia     Anxiety     Basal cell carcinoma     forehead    Basal cell carcinoma     left eylid    Basal cell carcinoma 08/18/2014    left prox cheek    Basal cell carcinoma 9/13/13    Right anterior shoulder    Basal cell carcinoma     Chronic rhinitis 5/3/2013    Chronic rhinitis 5/3/2013    Coronary artery disease involving native coronary artery of native heart without angina pectoris s/p RCA stent     Cortical cataract of both eyes 3/18/2016    Depression     Erectile dysfunction 3/24/2014    Erectile dysfunction 3/24/2014    Essential hypertension     GERD (gastroesophageal reflux disease) 7/25/2012    Gout, arthritis     Grade III open fracture of left tibia and fibula s/p ex-fix on 7/1/16 and ORIF of left tibia on 7/15 7/6/2016    H/O: iritis     Helicobacter pylori (H. pylori) infection     Treated    Herpes simplex keratoconjunctivitis 9/30/2015    - on acyclovir - followed by opthalmology, Dr. Uribe     Herpes simplex keratoconjunctivitis 9/30/2015    - on acyclovir - followed by opthalmology, Dr. Uribe     Hyperkalemia 2/28/2017    Hyperlipidemia     Hypogonadism male     Hypogonadism male     Mixed anxiety and depressive disorder     Morbid obesity     Obstructive sleep apnea on CPAP     CPAP    Osteoarthritis of left knee 7/25/2012    Paroxysmal atrial fibrillation 7/6/2016    Primary osteoarthritis of left knee 7/25/2012    Prominent aorta 1/25/2016    "RESULTS: THE HEART IS MILDLY ENLARGED WITH A SLIGHTLY PROMINENT AORTA" - Xray Chest PA & Lateral 12-     Prostate cancer 2/15/2016    - followed by urology, Dr. Young     Prostate cancer 2/15/2016    - followed by urology, Dr. 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    chest and forehead    Squamous cell cancer of skin of nose     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 7/6/2016    Type III open fracture of left tibia and fibula with routine healing 7/6/2016    Vitamin D deficiency disease     Vitreous detachment 3/18/2016       PAST SURGICAL HISTORY:  Past Surgical History:   Procedure Laterality Date    APPLICATION-EXTERNAL FIXATION DEVICE, ankle Left 12/28/2016    Performed by [facility] at [facility] OR 2ND [facility]    Cardiac stenting x2      CATARACT EXTRACTION W/  INTRAOCULAR LENS IMPLANT Right 3/29/2016    Dr. Conteh    CATARACT EXTRACTION W/  INTRAOCULAR LENS IMPLANT Left 4/12/2016        DEBRIDEMENT-FOOT Left 4/13/2017    Performed by [facility] at [facility] OR 2ND [facility]LR    EXTERNAL FIXATION TIBIAL FRACTURE Left 07/01/2016    FULL THICKNESS SKIN GRAFT from left shoulder Left 12/8/2015    Performed by Colt Sosa [facility] at [facility] OR 2ND FLR    INSERTION, RADIOACTIVE SEED, PROSTATE N/A 8/8/2018    Performed by Bipin Thompson MD at [facility] OR 1ST FLR    INSERTION-INTRAOCULAR LENS (IOL) Left 4/12/2016    Performed by Pool [facility]h MD at [facility] OR 1ST FLR    INSERTION-INTRAOCULAR LENS (IOL) Right 3/29/2016    Performed by Pool [facility]h [facility] at [facility] OR [facility]ST FLR    MAPPING-ULTRASOUND  6/13/2018    Performed by [facility]ipin Thompson [facility] at [facility] OR 1ST FLR    OPEN REDUCTION INTERNAL [id number] Left 4/13/2017    Performed by [facility] at [facility] OR 2ND [facility]LR    OPEN REDUCTION INTERNAL FIXATION-TIBIA Left 7/15/2016    Performed by [facility] at [facility] OR [facility]ND FLR    ORIF TIBIA FRACTURE Left 07/15/2016    PHACOEMULSIFICATION-ASPIRATION-CATARACT Left 4/12/2016    Performed by Pool [facility]h [facility] at [facility] OR [facility]ST FLR    PHACOEMULSIFICATION-ASPIRATION-CATARACT Right 3/29/2016    Performed by Pool  SHANDA Conteh MD at [facility] OR 1ST FLR    RADIOACTIVE SEED IMPLANTATION OF PROSTATE N/A 8/8/2018    Procedure: INSERTION, RADIOACTIVE SEED, PROSTATE;  Surgeon: Bipin Thompson MD;  Location: [facility] OR [address] [place] [address];  Service: Urology;  Laterality: N/A;  1 hour    REDUCTION-CLOSED-TIBIA Left 12/28/2016    Performed by Walter [facility] MD at [facility] OR 2ND FLR    REMOVAL OF EXTERNAL FIXATION DEVICE Left 7/15/2016    Performed by Walter [facility] MD at [facility] OR 2ND FLR    REMOVAL OF EXTERNAL FIXATION DEVICE, LEFT ANKLE C ARM Left 4/13/2017    Performed by Walter [facility] MD at [facility] OR 2ND FLR    REMOVAL-HARDWARE-LEG Left 4/13/2017    [facility]    REPAIR-MOHS DEFECT Basal Cell Left Lateral Eyebrow Left 12/8/2015    Performed by Colt Sosa MD at [facility] OR 2ND FLR    Squamous cell cancer removal x3 with Mohs surgery      TONSILLECTOMY      TOTAL KNEE ARTHROPLASTY  10/2012    trus/bx         SOCIAL HISTORY:  Social History     Socioeconomic History    Marital status:      Spouse name: Not on file    Number of children: Not on file    Years of education: Not on file    Highest education level: Not on file   Social Needs    Financial resource strain: Not on file    Food insecurity - worry: Not on file    Food insecurity - inability: Not on file    Transportation needs - medical: Not on file    Transportation needs - non-medical: Not on file   Occupational History    Occupation: Retired    Tobacco Use    Smoking status: Never Smoker    Smokeless tobacco: Never Used   Substance and Sexual Activity    Alcohol use: Yes     Comment: occasionally    Drug use: No    Sexual activity: Yes   Other Topics Concern    Not on file   Social History Narrative    Not on file       FAMILY HISTORY:  Family History   Problem Relation Age of Onset    Skin cancer Father     Lung cancer Father     Cancer Father         smoker,     Alzheimer's disease Mother      Hypertension Mother     Cancer Sister         colon, lung cancer     Cancer Brother         skin cancer, polypectomy     Peripheral vascular disease Unknown     Melanoma Neg Hx     Psoriasis Neg Hx     Lupus Neg Hx     Eczema Neg Hx     Amblyopia Neg Hx     Blindness Neg Hx     Cataracts Neg Hx     Diabetes Neg Hx     Glaucoma Neg Hx     Macular degeneration Neg Hx     Retinal detachment Neg Hx     Strabismus Neg Hx     Stroke Neg Hx     Thyroid disease Neg Hx     Acne Neg Hx        ALLERGIES AND MEDICATIONS: updated and reviewed.  Review of patient's allergies indicates:   Allergen Reactions    Ciprofloxacin Rash     Diffuse pruritic morbilliform rash developed 3/15/2017 after dose of cipro; previously in 2/2017 he had rash/fevers after initiation of cipro    Zosyn [piperacillin-tazobactam] Anaphylaxis     Diffuse pruritic morbilliform rash developed 3/15/2017.  Then, 430am dose on 3/16 and rash worsened with SOB/tachypnea but no hypoxemia.     Bacitracin Itching and Rash     Violaceous rash in area of topical Tx.         Medication List           Accurate as of 11/16/18  5:56 PM. If you have any questions, ask your nurse or doctor.               CHANGE how you take these medications    artificial tears 0.5 % ophthalmic solution  Commonly known as:  ISOPTO TEARS  Place 1 drop into both eyes as needed.  What changed:  reasons to take this     gabapentin 300 MG capsule  Commonly known as:  NEURONTIN  Take 1 capsule (300 mg total) by mouth 2 (two) times daily.  What changed:  Another medication with the same name was removed. Continue taking this medication, and follow the directions you see here.  Changed by:  Miguelina Weathers MD        CONTINUE taking these medications    acyclovir 800 MG Tab  Commonly known as:  ZOVIRAX  Take 1 tablet (800 mg total) by mouth 2 (two) times daily.     amoxicillin 500 MG capsule  Commonly known as:  AMOXIL     apixaban 5 mg Tab  Commonly known as:  ELIQUIS  Take 1  tablet (5 mg total) by mouth 2 (two) times daily.     aspirin 81 MG EC tablet  Commonly known as:  ECOTRIN  Take 1 tablet (81 mg total) by mouth once daily.     atorvastatin 40 MG tablet  Commonly known as:  LIPITOR  Take 1 tablet (40 mg total) by mouth once daily.     fluorouracil 5 % cream  Commonly known as:  EFUDEX  AAA bid x 4 weeks right neck and forehead     FLUZONE HIGH-DOSE 2018-19 (PF) 180 mcg/0.5 mL vaccine  Generic drug:  influenza  Inject into the muscle.     furosemide 20 MG tablet  Commonly known as:  LASIX  Take 1 tablet (20 mg total) by mouth once daily.     * honey 100 % Pste  Commonly known as:  MEDIHONEY (HONEY)  Apply 1 application topically 2 (two) times daily as needed.     * honey 100 % Pste  Commonly known as:  MEDIHONEY (HONEY)  Apply 1 application topically 2 (two) times daily.     melatonin 3 mg Tab     MULTIPLE VITAMINS per tablet  Generic drug:  multivitamin     omeprazole 20 MG capsule  Commonly known as:  PRILOSEC  Take 1 capsule (20 mg total) by mouth daily as needed.     ramipril 5 MG capsule  Commonly known as:  ALTACE  Take 1 capsule (5 mg total) by mouth once daily.     sildenafil 100 MG tablet  Commonly known as:  VIAGRA  Take 1 tablet (100 mg total) by mouth daily as needed for Erectile Dysfunction.     tamsulosin 0.4 mg Cap  Commonly known as:  FLOMAX  TAKE ONE CAPSULE BY MOUTH ONCE DAILY     venlafaxine 75 MG tablet  Commonly known as:  EFFEXOR  TAKE TWO TABLETS BY MOUTH TWICE DAILY     VITAMIN B-12 ORAL     vitamin D 1000 units Tab  Commonly known as:  VITAMIN D3  Take 1 tablet (1,000 Units total) by mouth once daily.     zinc sulfate 220 (50) mg capsule  Commonly known as:  ZINCATE  Take 1 capsule (220 mg total) by mouth once daily.         * This list has 2 medication(s) that are the same as other medications prescribed for you. Read the directions carefully, and ask your doctor or other care provider to review them with you.                   CARE TEAM:  Patient Care  "Team:  Miguelina Weathers MD as PCP - General (Internal Medicine)  Luis Palacio MD as Consulting Physician (Cardiology)  Colt Sosa MD as Consulting Physician (Plastic Surgery)  AURELIANO Minor MD as Consulting Physician (Urology)  Walter Cortés MD as Consulting Physician (Orthopedic Surgery)  Miguelina Weathers MD as Hypertension Digital Medicine Responsible Provider (Internal Medicine)  Jocelyne Monge, PharmD as Hypertension Digital Medicine Clinician (Pharmacist)  Miguelina Kitchen as Digital Medicine Health   Miguel Smith MD as Consulting Physician (INTERVENTIONAL CARDIOLOGY)         REVIEW OF SYSTEMS:  Review of Systems   Constitutional: Positive for fatigue (- chronic). Negative for chills, fever and unexpected weight change.   HENT: Negative for congestion, ear pain, sore throat and voice change.    Eyes: Negative for photophobia, pain, discharge and visual disturbance.   Respiratory: Positive for shortness of breath (- admits to being very sedentary). Negative for cough and wheezing.    Cardiovascular: Positive for leg swelling (- chronic). Negative for chest pain and palpitations.   Gastrointestinal: Negative for abdominal pain, blood in stool, constipation, diarrhea, nausea and vomiting.   Endocrine:        - he always feels warm (lifelong problem)   Genitourinary: Positive for frequency (-with postvoid dribbling). Negative for dysuria.   Musculoskeletal:        - leg pain - will start PT soon   Skin: Negative for color change and rash.   Neurological: Negative for seizures, weakness and headaches.   Hematological: Negative for adenopathy. Does not bruise/bleed easily.   Psychiatric/Behavioral: Negative for behavioral problems and dysphoric mood. The patient is not nervous/anxious.          PHYSICAL EXAM:  Vitals:    11/16/18 1452   BP: 128/66   Pulse: 97   Temp: 98 °F (36.7 °C)     Weight: (!) 164.3 kg (362 lb 3.5 oz)   Height: 6' 1" (185.4 cm)   Body mass index is 47.79 " kg/m².    Physical Examination: General appearance - alert, well appearing, and in no distress and morbidly obese  Mental status - alert, oriented to person, place, and time, normal mood, behavior, speech, dress, motor activity, and thought processes  Eyes - pupils equal and reactive, extraocular eye movements intact, sclera anicteric  Mouth - mucous membranes moist, pharynx normal without lesions  Neck - supple, no significant adenopathy, carotids upstroke normal bilaterally, no bruits, thyroid exam: thyroid is normal in size without nodules or tenderness  Lymphatics - no palpable lymphadenopathy  Chest - clear to auscultation, no wheezes, rales or rhonchi, symmetric air entry  Heart - normal rate and regular rhythm, no gallops noted   Male - not examined  Back exam - limited range of motion, no significant pain with motion noted during exam; in depth exam deferred  Neurological - alert, oriented, normal speech, no focal findings or movement disorder noted, cranial nerves II through XII intact  Musculoskeletal - not examined  Extremities - pedal edema 1-2 +, varicose veins noted  Skin - normal coloration and turgor, no rashes, no suspicious skin lesions noted       ASSESSMENT AND PLAN:  1. Shortness of breath  I am not sure if this is due to deconditioning or if he is truly having a cardiac problem.  I will send a note to his cardiologist.  He is planned to start some increased physical activity in the near future.  He will go to the emergency room for any acute changes in his medical condition.    2. Essential hypertension/3. Coronary artery disease involving native coronary artery of native heart without angina pectoris s/p RCA stent/4. Paroxysmal atrial fibrillation   Discussed sodium restriction, maintaining ideal body weight and regular exercise program as physiologic means to achieve blood pressure control. The patient will strive towards this. The current medical regimen is effective;  continue present  plan and medications. Recommended patient to check home readings to monitor and see me for followup as scheduled or sooner as needed. Patient was educated that both decongestant and anti-inflammatory medication may raise blood pressure. Followed by cardiology.    5. Hyperlipidemia, unspecified hyperlipidemia type  We discussed low fat diet and regular exercise.The current medical regimen is effective;  continue present plan and medications.    - Lipid panel; Future    6. Obstructive sleep apnea on CPAP  CPAP compliance: yes.  We discussed the potential ramifications of untreated sleep apnea, which could include daytime sleepiness, hypertension, heart disease including CHF, sudden death while sleeping and/or stroke. The patient was advised to abstain from driving should they feel sleepy or drowsy.  We discussed potential treatment options, which could include weight loss, body positioning, continuous positive airway pressure (CPAP), or referral for surgical consideration.      7. Prostate cancer  Status post brachytherapy in August.  Followed by Urology.    8. Major depressive disorder, recurrent episode, mild  The current medical regimen is effective;  continue present plan and medications.     9. PVD (peripheral vascular disease)  Stable. Followed by vascular surgery.    10. Morbid obesity with BMI of 40.0-44.9, adult  The patient is asked to make an attempt to improve diet and exercise patterns to aid in medical management of this problem.     11. Colon cancer screening    - Case request GI: COLONOSCOPY    12. Need for Tdap vaccination  Patient was advised to get immunization at the pharmacy.     13. Need for shingles vaccine  Patient was advised to get immunization at the pharmacy.     14. Bilateral leg edema  I discussed with the patient the need for low salt diet, leg elevation, and support stockings.             Follow-up in about 6 months (around 5/16/2019). or sooner as needed.

## 2018-11-16 NOTE — Clinical Note
Dr. Smith - our mutual patient is complaining of worsening SOB with even minimal exertion and completion of ADLs. He realizes he is likely deconditioned but he is worried about his heart. Can you please follow up with him? Thanks!  Miguelina

## 2018-11-21 ENCOUNTER — PATIENT OUTREACH (OUTPATIENT)
Dept: OTHER | Facility: OTHER | Age: 73
End: 2018-11-21

## 2018-11-21 NOTE — PROGRESS NOTES
Last 5 Patient Entered Readings                                      Current 30 Day Average: 140/67     Recent Readings 11/6/2018 11/1/2018 10/25/2018 10/11/2018    SBP (mmHg) 134 139 146 131    DBP (mmHg) 59 69 72 78    Pulse 86 83 71 71          Left voicemail. Need to complete pharmacist introduction. Pertinent PMH includes htn, hld, CAD, afib, bph, gout, and CHARISMA. Currently on ramipril. On metoprolol in past. Review questionnaires.

## 2018-11-26 ENCOUNTER — TELEPHONE (OUTPATIENT)
Dept: RADIATION ONCOLOGY | Facility: CLINIC | Age: 73
End: 2018-11-26

## 2018-11-27 ENCOUNTER — CLINICAL SUPPORT (OUTPATIENT)
Dept: CARDIOLOGY | Facility: HOSPITAL | Age: 73
End: 2018-11-27
Attending: INTERNAL MEDICINE
Payer: MEDICARE

## 2018-11-27 DIAGNOSIS — Z46.9 FITTING AND ADJUSTMENT OF DEVICE: ICD-10-CM

## 2018-11-27 PROCEDURE — 93299 CARDIAC DEVICE CHECK - REMOTE: CPT | Mod: HCNC

## 2018-11-28 ENCOUNTER — LAB VISIT (OUTPATIENT)
Dept: LAB | Facility: HOSPITAL | Age: 73
End: 2018-11-28
Attending: INTERNAL MEDICINE
Payer: MEDICARE

## 2018-11-28 DIAGNOSIS — E78.5 HYPERLIPIDEMIA, UNSPECIFIED HYPERLIPIDEMIA TYPE: ICD-10-CM

## 2018-11-28 LAB
CHOLEST SERPL-MCNC: 150 MG/DL
CHOLEST/HDLC SERPL: 5 {RATIO}
HDLC SERPL-MCNC: 30 MG/DL
HDLC SERPL: 20 %
LDLC SERPL CALC-MCNC: 90.2 MG/DL
NONHDLC SERPL-MCNC: 120 MG/DL
TRIGL SERPL-MCNC: 149 MG/DL

## 2018-11-28 PROCEDURE — 36415 COLL VENOUS BLD VENIPUNCTURE: CPT | Mod: HCNC,PO

## 2018-11-28 PROCEDURE — 80061 LIPID PANEL: CPT | Mod: HCNC

## 2018-11-28 NOTE — PROGRESS NOTES
Last 5 Patient Entered Readings                                      Current 30 Day Average: 137/64     Recent Readings 11/6/2018 11/1/2018 10/25/2018 10/11/2018    SBP (mmHg) 134 139 146 131    DBP (mmHg) 59 69 72 78    Pulse 86 83 71 71          Left voicemail and sent [facility] message. Need to complete pharmacist introduction. Pertinent PMH includes htn, hld, CAD, afib, bph, gout, and CHARISMA. Currently on ramipril. On metoprolol in past. Review questionnaires. Last BP reading 11/6.

## 2018-12-06 NOTE — PROGRESS NOTES
Last 5 Patient Entered Readings                                      Current 30 Day Average: 134/59     Recent Readings 11/6/2018 11/1/2018 10/25/2018 10/11/2018    SBP (mmHg) 134 139 146 131    DBP (mmHg) 59 69 72 78    Pulse 86 83 71 71          Left voicemail. Need to complete pharmacist introduction. Pertinent PMH includes htn, hld, CAD, afib, bph, gout, and CHARISMA. Currently on ramipril. On metoprolol in past. Review questionnaires. Last BP reading 11/6.

## 2018-12-18 NOTE — PROGRESS NOTES
Last 5 Patient Entered Readings                                      Current 30 Day Average: 141/68     Recent Readings 12/17/2018 11/6/2018 11/1/2018 10/25/2018 10/11/2018    SBP (mmHg) 141 134 139 146 131    DBP (mmHg) 68 59 69 72 78    Pulse 71 86 83 71 71          Patient's BP average is above goal of <130/80.     Patient denies s/s of hypotension (lightheadedness, dizziness, nausea, fatigue) associated with low readings. Instructed patient to inform me if this occurs, patient confirms understanding.      Patient denies s/s of hypertension (SOB, CP, severe headaches, changes in vision) associated with high readings. Instructed patient to go to the ED if BP > 180/110 and accompanied by hypertensive s/s, patient confirms understanding.    Completed pharmacist introduction. Pertinent PMH includes htn, hld, CAD, afib, bph, gout, and CHARISMA. Patient is agreeable to changing ramipril to ARB but requests prescription be sent after Jan 1 due to insurance. He is having difficulty with BP cuff charging. He prefers to take it to [facility] versus tech support calling. He agreed to at least 2 BP readings per week to better determine ARB dose.    Will continue to monitor regularly. Will follow up in 2-3 weeks, sooner if BP begins to trend upward or downward.    Patient has my contact information and knows to call with any concerns or clinical changes.     Current HTN regimen:  Hypertension Medications             furosemide (LASIX) 20 MG tablet Take 1 tablet (20 mg total) by mouth once daily.    ramipril (ALTACE) 5 MG capsule Take 1 capsule (5 mg total) by mouth once daily.

## 2018-12-26 DIAGNOSIS — Z95.818 STATUS POST PLACEMENT OF IMPLANTABLE LOOP RECORDER: Primary | ICD-10-CM

## 2018-12-28 ENCOUNTER — CLINICAL SUPPORT (OUTPATIENT)
Dept: CARDIOLOGY | Facility: HOSPITAL | Age: 73
End: 2018-12-28
Attending: INTERNAL MEDICINE
Payer: MEDICARE

## 2018-12-28 DIAGNOSIS — Z95.818 STATUS POST PLACEMENT OF IMPLANTABLE LOOP RECORDER: ICD-10-CM

## 2018-12-28 PROCEDURE — 93299 CARDIAC DEVICE CHECK - REMOTE: CPT | Mod: HCNC

## 2018-12-28 PROCEDURE — 93298 REM INTERROG DEV EVAL SCRMS: CPT | Mod: HCNC,,, | Performed by: INTERNAL MEDICINE

## 2018-12-28 PROCEDURE — 93298 CARDIAC DEVICE CHECK - REMOTE: ICD-10-PCS | Mod: HCNC,,, | Performed by: INTERNAL MEDICINE

## 2019-01-02 ENCOUNTER — PATIENT OUTREACH (OUTPATIENT)
Dept: OTHER | Facility: OTHER | Age: 74
End: 2019-01-02

## 2019-01-02 NOTE — PROGRESS NOTES
Last 5 Patient Entered Readings                                      Current 30 Day Average: 142/68     Recent Readings 12/23/2018 12/18/2018 12/17/2018 11/6/2018 11/1/2018    SBP (mmHg) 146 140 141 134 139    DBP (mmHg) 67 68 68 59 69    Pulse 75 75 71 86 83        Digital Medicine: Health  Follow Up     Left voicemail to follow up with Mr. Janusz Montgomery Jr..  Current BP average 142/68 mmHg is not at goal, <130/80 mmHg.  Explained health  change. Will follow up in 2 weeks.

## 2019-01-04 ENCOUNTER — OFFICE VISIT (OUTPATIENT)
Dept: URGENT CARE | Facility: CLINIC | Age: 74
End: 2019-01-04
Payer: MEDICARE

## 2019-01-04 VITALS
HEART RATE: 97 BPM | OXYGEN SATURATION: 92 % | RESPIRATION RATE: 20 BRPM | SYSTOLIC BLOOD PRESSURE: 155 MMHG | DIASTOLIC BLOOD PRESSURE: 77 MMHG | TEMPERATURE: 98 F | BODY MASS INDEX: 41.75 KG/M2 | WEIGHT: 315 LBS | HEIGHT: 73 IN

## 2019-01-04 DIAGNOSIS — J02.9 SORE THROAT: ICD-10-CM

## 2019-01-04 DIAGNOSIS — I10 ESSENTIAL HYPERTENSION: ICD-10-CM

## 2019-01-04 DIAGNOSIS — J20.9 ACUTE BRONCHITIS, UNSPECIFIED ORGANISM: Primary | ICD-10-CM

## 2019-01-04 DIAGNOSIS — Z09 S/P ORTHOPEDIC SURGERY, FOLLOW-UP EXAM: Primary | ICD-10-CM

## 2019-01-04 DIAGNOSIS — R06.89 DECREASED BREATH SOUNDS: ICD-10-CM

## 2019-01-04 DIAGNOSIS — J01.00 ACUTE NON-RECURRENT MAXILLARY SINUSITIS: ICD-10-CM

## 2019-01-04 DIAGNOSIS — H66.001 ACUTE SUPPURATIVE OTITIS MEDIA OF RIGHT EAR WITHOUT SPONTANEOUS RUPTURE OF TYMPANIC MEMBRANE, RECURRENCE NOT SPECIFIED: ICD-10-CM

## 2019-01-04 LAB
CTP QC/QA: YES
S PYO RRNA THROAT QL PROBE: NEGATIVE

## 2019-01-04 PROCEDURE — 99499 UNLISTED E&M SERVICE: CPT | Mod: S$GLB,,, | Performed by: NURSE PRACTITIONER

## 2019-01-04 PROCEDURE — 3077F PR MOST RECENT SYSTOLIC BLOOD PRESSURE >= 140 MM HG: ICD-10-PCS | Mod: CPTII,S$GLB,, | Performed by: NURSE PRACTITIONER

## 2019-01-04 PROCEDURE — 99214 PR OFFICE/OUTPT VISIT, EST, LEVL IV, 30-39 MIN: ICD-10-PCS | Mod: S$GLB,,, | Performed by: NURSE PRACTITIONER

## 2019-01-04 PROCEDURE — 3077F SYST BP >= 140 MM HG: CPT | Mod: CPTII,S$GLB,, | Performed by: NURSE PRACTITIONER

## 2019-01-04 PROCEDURE — 99214 OFFICE O/P EST MOD 30 MIN: CPT | Mod: S$GLB,,, | Performed by: NURSE PRACTITIONER

## 2019-01-04 PROCEDURE — 94640 AIRWAY INHALATION TREATMENT: CPT | Mod: 59,S$GLB,, | Performed by: NURSE PRACTITIONER

## 2019-01-04 PROCEDURE — 71046 XR CHEST PA AND LATERAL: ICD-10-PCS | Mod: S$GLB,,, | Performed by: RADIOLOGY

## 2019-01-04 PROCEDURE — 3078F PR MOST RECENT DIASTOLIC BLOOD PRESSURE < 80 MM HG: ICD-10-PCS | Mod: CPTII,S$GLB,, | Performed by: NURSE PRACTITIONER

## 2019-01-04 PROCEDURE — 87880 POCT RAPID STREP A: ICD-10-PCS | Mod: QW,S$GLB,, | Performed by: NURSE PRACTITIONER

## 2019-01-04 PROCEDURE — 71046 X-RAY EXAM CHEST 2 VIEWS: CPT | Mod: S$GLB,,, | Performed by: RADIOLOGY

## 2019-01-04 PROCEDURE — 99499 RISK ADDL DX/OHS AUDIT: ICD-10-PCS | Mod: S$GLB,,, | Performed by: NURSE PRACTITIONER

## 2019-01-04 PROCEDURE — 94640 PR INHAL RX, AIRWAY OBST/DX SPUTUM INDUCT: ICD-10-PCS | Mod: 59,S$GLB,, | Performed by: NURSE PRACTITIONER

## 2019-01-04 PROCEDURE — 87880 STREP A ASSAY W/OPTIC: CPT | Mod: QW,S$GLB,, | Performed by: NURSE PRACTITIONER

## 2019-01-04 PROCEDURE — 3078F DIAST BP <80 MM HG: CPT | Mod: CPTII,S$GLB,, | Performed by: NURSE PRACTITIONER

## 2019-01-04 PROCEDURE — 1101F PT FALLS ASSESS-DOCD LE1/YR: CPT | Mod: CPTII,S$GLB,, | Performed by: NURSE PRACTITIONER

## 2019-01-04 PROCEDURE — 1101F PR PT FALLS ASSESS DOC 0-1 FALLS W/OUT INJ PAST YR: ICD-10-PCS | Mod: CPTII,S$GLB,, | Performed by: NURSE PRACTITIONER

## 2019-01-04 RX ORDER — ALBUTEROL SULFATE 90 UG/1
2 AEROSOL, METERED RESPIRATORY (INHALATION) EVERY 6 HOURS PRN
Qty: 18 G | Refills: 0 | Status: SHIPPED | OUTPATIENT
Start: 2019-01-04 | End: 2020-02-17 | Stop reason: SDUPTHER

## 2019-01-04 RX ORDER — PREDNISONE 20 MG/1
20 TABLET ORAL DAILY
Qty: 5 TABLET | Refills: 0 | Status: SHIPPED | OUTPATIENT
Start: 2019-01-04 | End: 2019-01-09

## 2019-01-04 RX ORDER — GUAIFENESIN 600 MG/1
600 TABLET, EXTENDED RELEASE ORAL 2 TIMES DAILY
Qty: 30 TABLET | Refills: 0 | Status: SHIPPED | OUTPATIENT
Start: 2019-01-04 | End: 2019-01-19

## 2019-01-04 RX ORDER — AZELASTINE 1 MG/ML
2 SPRAY, METERED NASAL 2 TIMES DAILY
Qty: 30 ML | Refills: 0 | Status: SHIPPED | OUTPATIENT
Start: 2019-01-04 | End: 2019-06-21

## 2019-01-04 RX ORDER — IPRATROPIUM BROMIDE 0.5 MG/2.5ML
0.5 SOLUTION RESPIRATORY (INHALATION)
Status: COMPLETED | OUTPATIENT
Start: 2019-01-04 | End: 2019-01-04

## 2019-01-04 RX ORDER — LEVALBUTEROL INHALATION SOLUTION 1.25 MG/3ML
1.25 SOLUTION RESPIRATORY (INHALATION)
Status: COMPLETED | OUTPATIENT
Start: 2019-01-04 | End: 2019-01-04

## 2019-01-04 RX ORDER — CODEINE PHOSPHATE AND GUAIFENESIN 10; 100 MG/5ML; MG/5ML
10 SOLUTION ORAL NIGHTLY PRN
Qty: 70 ML | Refills: 0 | Status: SHIPPED | OUTPATIENT
Start: 2019-01-04 | End: 2019-01-11

## 2019-01-04 RX ORDER — DOXYCYCLINE 100 MG/1
100 CAPSULE ORAL EVERY 12 HOURS
Qty: 20 CAPSULE | Refills: 0 | Status: SHIPPED | OUTPATIENT
Start: 2019-01-04 | End: 2019-01-14

## 2019-01-04 RX ADMIN — LEVALBUTEROL INHALATION SOLUTION 1.25 MG: 1.25 SOLUTION RESPIRATORY (INHALATION) at 11:01

## 2019-01-04 RX ADMIN — IPRATROPIUM BROMIDE 0.5 MG: 0.5 SOLUTION RESPIRATORY (INHALATION) at 11:01

## 2019-01-04 NOTE — PATIENT INSTRUCTIONS
Please return here or go to the Emergency Department for any concerns or worsening of condition.  If you were prescribed antibiotics, please take them to completion.  If you were prescribed a narcotic medication, do not drive or operate heavy equipment or machinery while taking these medications.  Please follow up with your primary care doctor or specialist as needed.    If you  smoke, please stop smoking.    What Is Acute Bronchitis?  Acute bronchitis is when the airways in your lungs (bronchial tubes) become red and swollen (inflamed). It is usually caused by a viral infection. But it can also occur because of a bacteria or allergen. Symptoms include a cough that produces yellow or greenish mucus and can last for days or sometimes weeks.  Inside healthy lungs    Air travels in and out of the lungs through the airways. The linings of these airways produce sticky mucus. This mucus traps particles that enter the lungs. Tiny structures called cilia then sweep the particles out of the airways.     Healthy airway: Airways are normally open. Air moves in and out easily.      Healthy cilia: Tiny, hairlike cilia sweep mucus and particles up and out of the airways.   Lungs with bronchitis  Bronchitis often occurs with a cold or the flu virus. The airways become inflamed (red and swollen). There is a deep hacking cough from the extra mucus. Other symptoms may include:  · Wheezing  · Chest discomfort  · Shortness of breath  · Mild fever  A second infection, this time due to bacteria, may then occur. And airways irritated by allergens or smoke are more likely to get infected.        Inflamed airway: Inflammation and extra mucus narrow the airway, causing shortness of breath.      Impaired cilia: Extra mucus impairs cilia, causing congestion and wheezing. Smoking makes the problem worse.   Making a diagnosis  A physical exam, health history, and certain tests help your healthcare provider make the  diagnosis.  Health history  Your healthcare provider will ask you about your symptoms.  The exam  Your provider listens to your chest for signs of congestion. He or she may also check your ears, nose, and throat.  Possible tests  · A sputum test for bacteria. This requires a sample of mucus from your lungs.  · A nasal or throat swab. This tests to see if you have a bacterial infection.  · A chest X-ray. This is done if your healthcare provider thinks you have pneumonia.  · Tests to check for an underlying condition. Other tests may be done to check for things such as allergies, asthma, or COPD (chronic obstructive pulmonary disease). You may need to see a specialist for more lung function testing.  Treating a cough  The main treatment for bronchitis is easing symptoms. Avoiding smoke, allergens, and other things that trigger coughing can often help. If the infection is bacterial, you may be given antibiotics. During the illness, it's important to get plenty of sleep. To ease symptoms:  · Dont smoke. Also avoid secondhand smoke.  · Use a humidifier. Or try breathing in steam from a hot shower. This may help loosen mucus.  · Drink a lot of water and juice. They can soothe the throat and may help thin mucus.  · Sit up or use extra pillows when in bed. This helps to lessen coughing and congestion.  · Ask your provider about using medicine. Ask about using cough medicine, pain and fever medicine, or a decongestant.  Antibiotics  Most cases of bronchitis are caused by cold or flu viruses. They dont need antibiotics to treat them, even if your mucus is thick and green or yellow. Antibiotics dont treat viral illness and antibiotics have not been shown to have any benefit in cases of acute bronchitis. Taking antibiotics when they are not needed increases your risk of getting an infection later that is antibiotic-resistant. Antibiotics can also cause severe cases of diarrhea that require other antibiotics to treat.  It is  important that you accept your healthcare provider's opinion to not use antibiotics. Your provider will prescribe antibiotics if the infection is caused by bacteria. If they are prescribed:  · Take all of the medicine. Take the medicine until it is used up, even if symptoms have improved. If you dont, the bronchitis may come back.  · Take the medicines as directed. For instance, some medicines should be taken with food.  · Ask about side effects. Ask your provider or pharmacist what side effects are common, and what to do about them.  Follow-up care  You should see your provider again in 2 to 3 weeks. By this time, symptoms should have improved. An infection that lasts longer may mean you have a more serious problem.  Prevention  · Avoid tobacco smoke. If you smoke, quit. Stay away from smoky places. Ask friends and family not to smoke around you, or in your home or car.  · Get checked for allergies.  · Ask your provider about getting a yearly flu shot. Also ask about pneumococcal or pneumonia shots.  · Wash your hands often. This helps reduce the chance of picking up viruses that cause colds and flu.  Call your healthcare provider if:  · Symptoms worsen, or you have new symptoms  · Breathing problems worsen or  become severe  · Symptoms dont get better within a week, or within 3 days of taking antibiotics   Date Last Reviewed: 2/1/2017  © 8872-8710 [employer]. [address] [place] [address], [place] [address]. All rights reserved. This information is not intended as a substitute for professional medical care. Always follow your healthcare professional's instructions.        Bronchitis with Wheezing (Viral or Bacterial: Adult)    Bronchitis is an infection of the air passages. It often occurs during a cold and is usually caused by a virus. Symptoms include cough with mucus (phlegm) and low-grade fever. This illness is contagious during the first few days and is spread through the air by coughing and  sneezing, or by direct contact (touching the sick person and then touching your own eyes, nose, or mouth).  If there is a lot of inflammation, air flow is restricted. The air passages may also go into spasm, especially if you have asthma. This causes wheezing and difficulty breathing even in people who do not have asthma.  Bronchitis usually lasts 7 to 14 days. The wheezing should improve with treatment during the first week. An inhaler is often prescribed to relax the air passages and stop wheezing. Antibiotics will be prescribed if your doctor thinks there is also a secondary bacterial infection.  Home care  · If symptoms are severe, rest at home for the first 2 to 3 days. When you go back to your usual activities, don't let yourself get too tired.  · Do not smoke. Also avoid being exposed to secondhand smoke.  · You may use over-the-counter medicine to control fever or pain, unless another medicine was prescribed. Note: If you have chronic liver or kidney disease or have ever had a stomach ulcer or gastrointestinal bleeding, talk with your healthcare provider before using these medicines. Also talk to your provider if you are taking medicine to prevent blood clots.) Aspirin should never be given to anyone younger than 18 years of age who is ill with a viral infection or fever. It may cause severe liver or brain damage.  · Your appetite may be poor, so a light diet is fine. Avoid dehydration by drinking 6 to 8 glasses of fluids per day (such as water, soft drinks, sports drinks, juices, tea, or soup). Extra fluids will help loosen secretions in the nose and lungs.  · Over-the-counter cough, cold, and sore-throat medicines will not shorten the length of the illness, but they may be helpful to reduce symptoms. (Note: Do not use decongestants if you have high blood pressure.)  · If you were given an inhaler, use it exactly as directed. If you need to use it more often than prescribed, your condition may be worsening.  If this happens, contact your healthcare provider.  · If prescribed, finish all antibiotic medicine, even if you are feeling better after only a few days.  Follow-up care  Follow up with your healthcare provider, or as advised. If you had an X-ray or ECG (electrocardiogram), a specialist will review it. You will be notified of any new findings that may affect your care.  Note: If you are age 65 or older, or if you have a chronic lung disease or condition that affects your immune system, or you smoke, talk to your healthcare provider about having a pneumococcal vaccinations and a yearly influenza vaccination (flu shot).  When to seek medical advice  Call your healthcare provider right away if any of these occur:  · Fever of 100.4°F (38°C) or higher  · Coughing up increasing amounts of colored sputum  · Weakness, drowsiness, headache, facial pain, ear pain, or a stiff neck  Call 911, or get immediate medical care  Contact emergency services right away if any of these occur.  · Coughing up blood  · Worsening weakness, drowsiness, headache, or stiff neck  · Increased wheezing not helped with medication, shortness of breath, or pain with breathing  Date Last Reviewed: 9/13/2015  © 0666-1631 [employer]. [address] [place] [address]. All rights reserved. This information is not intended as a substitute for professional medical care. Always follow your healthcare professional's instructions.        Middle Ear Infection (Adult)  You have an infection of the middle ear, the space behind the eardrum. This is also called acute otitis media (AOM). Sometimes it is caused by the common cold. This is because congestion can block the internal passage (eustachian tube) that drains fluid from the middle ear. When the middle ear fills with fluid, bacteria can grow there and cause an infection. Oral antibiotics are used to treat this illness, not ear drops. Symptoms usually start to improve within 1 to 2 days  of treatment.    Home care  The following are general care guidelines:  · Finish all of the antibiotic medicine given, even though you may feel better after the first few days.  · You may use over-the-counter medicine, such as acetaminophen or ibuprofen, to control pain and fever, unless something else was prescribed. If you have chronic liver or kidney disease or have ever had a stomach ulcer or gastrointestinal bleeding, talk with your healthcare provider before using these medicines. Do not give aspirin to anyone under 18 years of age who has a fever. It may cause severe illness or death.  Follow-up care  Follow up with your healthcare provider, or as advised, in 2 weeks if all symptoms have not gotten better, or if hearing doesn't go back to normal within 1 month.  When to seek medical advice  Call your healthcare provider right away if any of these occur:  · Ear pain gets worse or does not improve after 3 days of treatment  · Unusual drowsiness or confusion  · Neck pain, stiff neck, or headache  · Fluid or blood draining from the ear canal  · Fever of 100.4°F (38°C) or as advised   · Seizure  Date Last Reviewed: 6/1/2016 © 2000-2017 [employer] [address] [place] [address]. All rights reserved. This information is not intended as a substitute for professional medical care. Always follow your healthcare professional's instructions.        Sinusitis (Antibiotic Treatment)    The sinuses are air-filled spaces within the bones of the face. They connect to the inside of the nose. Sinusitis is an inflammation of the tissue lining the sinus cavity. Sinus inflammation can occur during a cold. It can also be due to allergies to pollens and other particles in the air. Sinusitis can cause symptoms of sinus congestion and fullness. A sinus infection causes fever, headache and facial pain. There is often green or yellow drainage from the nose or into the back of the throat (post-nasal drip). You  have been given antibiotics to treat this condition.  Home care:  · Take the full course of antibiotics as instructed. Do not stop taking them, even if you feel better.  · Drink plenty of water, hot tea, and other liquids. This may help thin mucus. It also may promote sinus drainage.  · Heat may help soothe painful areas of the face. Use a towel soaked in hot water. Or,  the shower and direct the hot spray onto your face. Using a vaporizer along with a menthol rub at night may also help.   · An expectorant containing guaifenesin may help thin the mucus and promote drainage from the sinuses.  · Over-the-counter decongestants may be used unless a similar medicine was prescribed. Nasal sprays work the fastest. Use one that contains phenylephrine or oxymetazoline. First blow the nose gently. Then use the spray. Do not use these medicines more often than directed on the label or symptoms may get worse. You may also use tablets containing pseudoephedrine. Avoid products that combine ingredients, because side effects may be increased. Read labels. You can also ask the pharmacist for help. (NOTE: Persons with high blood pressure should not use decongestants. They can raise blood pressure.)  · Over-the-counter antihistamines may help if allergies contributed to your sinusitis.    · Do not use nasal rinses or irrigation during an acute sinus infection, unless told to by your health care provider. Rinsing may spread the infection to other sinuses.  · Use acetaminophen or ibuprofen to control pain, unless another pain medicine was prescribed. (If you have chronic liver or kidney disease or ever had a stomach ulcer, talk with your doctor before using these medicines. Aspirin should never be used in anyone under 18 years of age who is ill with a fever. It may cause severe liver damage.)  · Don't smoke. This can worsen symptoms.  Follow-up care  Follow up with your healthcare provider or our staff if you are not improving  within the next week.  When to seek medical advice  Call your healthcare provider if any of these occur:  · Facial pain or headache becoming more severe  · Stiff neck  · Unusual drowsiness or confusion  · Swelling of the forehead or eyelids  · Vision problems, including blurred or double vision  · Fever of 100.4ºF (38ºC) or higher, or as directed by your healthcare provider  · Seizure  · Breathing problems  · Symptoms not resolving within 10 days  Date Last Reviewed: 4/13/2015  © 4115-0730 [employer]. [address] [place] [address], [place] [address]. All rights reserved. This information is not intended as a substitute for professional medical care. Always follow your healthcare professional's instructions.

## 2019-01-04 NOTE — PROGRESS NOTES
"Subjective:       Patient ID: Janusz Montgomery Jr. is a 73 y.o. male.    Vitals:  height is 6' 1" (1.854 m) and weight is 164.2 kg (362 lb) (abnormal). His temperature is 98.3 °F (36.8 °C). His blood pressure is 155/77 (abnormal) and his pulse is 97. His respiration is 20 and oxygen saturation is 92 (abnormal)%.     Chief Complaint: Sinus Problem    Pt has sinus congestion and sore throat. He can hardly eat , drink and swallow. His sinus are causing his ear discomfort. He has used a throat spray that didn't help much.      Sinus Problem   This is a new problem. The current episode started in the past 7 days (tuesday). The problem has been gradually worsening since onset. There has been no fever. He is experiencing no pain. Associated symptoms include congestion, coughing, ear pain, headaches, a hoarse voice, shortness of breath, sinus pressure and a sore throat. Pertinent negatives include no chills or diaphoresis.       Constitution: Positive for fatigue. Negative for chills, sweating and fever.   HENT: Positive for ear pain, congestion, postnasal drip, sinus pressure, sore throat and voice change. Negative for sinus pain.    Neck: Negative for painful lymph nodes.   Eyes: Negative for eye redness.   Respiratory: Positive for sleep apnea, chest tightness, cough and shortness of breath. Negative for sputum production, bloody sputum, COPD, stridor, wheezing and asthma.    Gastrointestinal: Negative for nausea and vomiting.   Musculoskeletal: Negative for muscle ache.   Skin: Negative for rash.   Allergic/Immunologic: Negative for seasonal allergies and asthma.   Neurological: Positive for headaches.   Hematologic/Lymphatic: Negative for swollen lymph nodes.       Objective:      Physical Exam   Constitutional: He is oriented to person, place, and time. He appears well-developed and well-nourished. He is cooperative.  Non-toxic appearance. He appears ill. No distress.   Sats 92% on room air, hypertensive, morbidly " obese   HENT:   Head: Normocephalic and atraumatic.   Right Ear: Hearing, external ear and ear canal normal. Tympanic membrane is erythematous. Tympanic membrane mobility is abnormal.   Left Ear: Hearing, external ear and ear canal normal. A middle ear effusion is present.   Nose: Mucosal edema and rhinorrhea present. No nasal deformity. No epistaxis. Right sinus exhibits no maxillary sinus tenderness and no frontal sinus tenderness. Left sinus exhibits no maxillary sinus tenderness and no frontal sinus tenderness.   Mouth/Throat: Uvula is midline and mucous membranes are normal. No trismus in the jaw. Normal dentition. No uvula swelling. Posterior oropharyngeal erythema present.   Eyes: Conjunctivae and lids are normal. No scleral icterus.   Sclera clear bilat   Neck: Trachea normal, normal range of motion, full passive range of motion without pain and phonation normal. Neck supple. No spinous process tenderness and no muscular tenderness present. No neck rigidity. Normal range of motion present.   Cardiovascular: Normal rate, regular rhythm, normal heart sounds and normal pulses.   Pulmonary/Chest: Effort normal. No respiratory distress. He has decreased breath sounds in the right upper field, the right middle field, the right lower field, the left upper field and the left lower field.   Patient was satting 92% on room air on initial assessment.  Also has decreased breath sounds throughout with minimal air movement on initial assessment.  Patient given DuoNeb treatment and chest x-ray performed.    Breath sounds improved after DuoNeb.  Chest x-ray negative for any acute changes.  Sats are 99% on room air after treatment.  Discussed in following up with pulmonology as he states he has had increasing shortness of breath over the past few months which has been unrelieved by anything.   Abdominal: Normal appearance.   Musculoskeletal: Normal range of motion. He exhibits no edema or deformity.   Lymphadenopathy:     He  has cervical adenopathy.        Right cervical: Superficial cervical adenopathy present.        Left cervical: Superficial cervical adenopathy present.   Neurological: He is alert and oriented to person, place, and time. He exhibits normal muscle tone. Coordination normal.   Skin: Skin is warm, dry and intact. Capillary refill takes less than 2 seconds. He is not diaphoretic. No pallor.   Psychiatric: He has a normal mood and affect. His speech is normal and behavior is normal. Judgment and thought content normal. Cognition and memory are normal.   Nursing note and vitals reviewed.      EXAMINATION:  XR CHEST PA AND LATERAL    CLINICAL HISTORY:  Other abnormalities of breathing    TECHNIQUE:  PA view of the chest.    COMPARISON:  Chest radiograph 03/17/2017    FINDINGS:  Cardiomediastinal silhouette is midline and stable in size and configuration without convincing evidence of failure.  Suspected metallic device projects over the upper left heart which could represent a loop recorder device.  Pulmonary vasculature and hilar regions are within normal limits.    Bilateral multifocal calcified pleural plaques grossly similar to prior, probably sequela of prior asbestos exposure.  The lungs are otherwise well expanded without large consolidation or definite new focal opacity.  No pleural effusion or pneumothorax noting chronic blunting of the left costophrenic angle which likely represents pleural thickening/scarring.  Included osseous structures appear grossly stable.      Impression       No detrimental change or radiographic acute intrathoracic process seen.    Grossly stable chronic findings as above.       Assessment:       1. Acute bronchitis, unspecified organism    2. Acute suppurative otitis media of right ear without spontaneous rupture of tympanic membrane, recurrence not specified    3. Acute non-recurrent maxillary sinusitis    4. Sore throat    5. Decreased breath sounds    6. Essential hypertension         Plan:         Acute bronchitis, unspecified organism  -     levalbuterol nebulizer solution 1.25 mg  -     ipratropium 0.02 % nebulizer solution 0.5 mg  -     guaiFENesin (MUCINEX) 600 mg 12 hr tablet; Take 1 tablet (600 mg total) by mouth 2 (two) times daily. for 15 days  Dispense: 30 tablet; Refill: 0  -     predniSONE (DELTASONE) 20 MG tablet; Take 1 tablet (20 mg total) by mouth once daily. for 5 days  Dispense: 5 tablet; Refill: 0  -     albuterol (PROVENTIL/VENTOLIN HFA) 90 mcg/actuation inhaler; Inhale 2 puffs into the lungs every 6 (six) hours as needed for Wheezing. Rescue  Dispense: 18 g; Refill: 0  -     guaifenesin-codeine 100-10 mg/5 ml (CHERATUSSIN AC)  mg/5 mL syrup; Take 10 mLs by mouth nightly as needed for Cough.  Dispense: 70 mL; Refill: 0    Acute suppurative otitis media of right ear without spontaneous rupture of tympanic membrane, recurrence not specified  -     guaiFENesin (MUCINEX) 600 mg 12 hr tablet; Take 1 tablet (600 mg total) by mouth 2 (two) times daily. for 15 days  Dispense: 30 tablet; Refill: 0    Acute non-recurrent maxillary sinusitis  -     guaiFENesin (MUCINEX) 600 mg 12 hr tablet; Take 1 tablet (600 mg total) by mouth 2 (two) times daily. for 15 days  Dispense: 30 tablet; Refill: 0  -     azelastine (ASTELIN) 137 mcg (0.1 %) nasal spray; 2 sprays (274 mcg total) by Nasal route 2 (two) times daily.  Dispense: 30 mL; Refill: 0  -     predniSONE (DELTASONE) 20 MG tablet; Take 1 tablet (20 mg total) by mouth once daily. for 5 days  Dispense: 5 tablet; Refill: 0  -     doxycycline (MONODOX) 100 MG capsule; Take 1 capsule (100 mg total) by mouth every 12 (twelve) hours. for 10 days  Dispense: 20 capsule; Refill: 0    Sore throat  -     POCT rapid strep A  -     predniSONE (DELTASONE) 20 MG tablet; Take 1 tablet (20 mg total) by mouth once daily. for 5 days  Dispense: 5 tablet; Refill: 0    Decreased breath sounds  -     XR CHEST PA AND LATERAL; Future; Expected date:  01/04/2019  -     levalbuterol nebulizer solution 1.25 mg  -     ipratropium 0.02 % nebulizer solution 0.5 mg  -     predniSONE (DELTASONE) 20 MG tablet; Take 1 tablet (20 mg total) by mouth once daily. for 5 days  Dispense: 5 tablet; Refill: 0  -     albuterol (PROVENTIL/VENTOLIN HFA) 90 mcg/actuation inhaler; Inhale 2 puffs into the lungs every 6 (six) hours as needed for Wheezing. Rescue  Dispense: 18 g; Refill: 0  -     Ambulatory referral to Pulmonology    Essential hypertension          Patient Instructions       Please return here or go to the Emergency Department for any concerns or worsening of condition.  If you were prescribed antibiotics, please take them to completion.  If you were prescribed a narcotic medication, do not drive or operate heavy equipment or machinery while taking these medications.  Please follow up with your primary care doctor or specialist as needed.    If you  smoke, please stop smoking.    What Is Acute Bronchitis?  Acute bronchitis is when the airways in your lungs (bronchial tubes) become red and swollen (inflamed). It is usually caused by a viral infection. But it can also occur because of a bacteria or allergen. Symptoms include a cough that produces yellow or greenish mucus and can last for days or sometimes weeks.  Inside healthy lungs    Air travels in and out of the lungs through the airways. The linings of these airways produce sticky mucus. This mucus traps particles that enter the lungs. Tiny structures called cilia then sweep the particles out of the airways.     Healthy airway: Airways are normally open. Air moves in and out easily.      Healthy cilia: Tiny, hairlike cilia sweep mucus and particles up and out of the airways.   Lungs with bronchitis  Bronchitis often occurs with a cold or the flu virus. The airways become inflamed (red and swollen). There is a deep hacking cough from the extra mucus. Other symptoms may include:  · Wheezing  · Chest  discomfort  · Shortness of breath  · Mild fever  A second infection, this time due to bacteria, may then occur. And airways irritated by allergens or smoke are more likely to get infected.        Inflamed airway: Inflammation and extra mucus narrow the airway, causing shortness of breath.      Impaired cilia: Extra mucus impairs cilia, causing congestion and wheezing. Smoking makes the problem worse.   Making a diagnosis  A physical exam, health history, and certain tests help your healthcare provider make the diagnosis.  Health history  Your healthcare provider will ask you about your symptoms.  The exam  Your provider listens to your chest for signs of congestion. He or she may also check your ears, nose, and throat.  Possible tests  · A sputum test for bacteria. This requires a sample of mucus from your lungs.  · A nasal or throat swab. This tests to see if you have a bacterial infection.  · A chest X-ray. This is done if your healthcare provider thinks you have pneumonia.  · Tests to check for an underlying condition. Other tests may be done to check for things such as allergies, asthma, or COPD (chronic obstructive pulmonary disease). You may need to see a specialist for more lung function testing.  Treating a cough  The main treatment for bronchitis is easing symptoms. Avoiding smoke, allergens, and other things that trigger coughing can often help. If the infection is bacterial, you may be given antibiotics. During the illness, it's important to get plenty of sleep. To ease symptoms:  · Dont smoke. Also avoid secondhand smoke.  · Use a humidifier. Or try breathing in steam from a hot shower. This may help loosen mucus.  · Drink a lot of water and juice. They can soothe the throat and may help thin mucus.  · Sit up or use extra pillows when in bed. This helps to lessen coughing and congestion.  · Ask your provider about using medicine. Ask about using cough medicine, pain and fever medicine, or a  decongestant.  Antibiotics  Most cases of bronchitis are caused by cold or flu viruses. They dont need antibiotics to treat them, even if your mucus is thick and green or yellow. Antibiotics dont treat viral illness and antibiotics have not been shown to have any benefit in cases of acute bronchitis. Taking antibiotics when they are not needed increases your risk of getting an infection later that is antibiotic-resistant. Antibiotics can also cause severe cases of diarrhea that require other antibiotics to treat.  It is important that you accept your healthcare provider's opinion to not use antibiotics. Your provider will prescribe antibiotics if the infection is caused by bacteria. If they are prescribed:  · Take all of the medicine. Take the medicine until it is used up, even if symptoms have improved. If you dont, the bronchitis may come back.  · Take the medicines as directed. For instance, some medicines should be taken with food.  · Ask about side effects. Ask your provider or pharmacist what side effects are common, and what to do about them.  Follow-up care  You should see your provider again in 2 to 3 weeks. By this time, symptoms should have improved. An infection that lasts longer may mean you have a more serious problem.  Prevention  · Avoid tobacco smoke. If you smoke, quit. Stay away from smoky places. Ask friends and family not to smoke around you, or in your home or car.  · Get checked for allergies.  · Ask your provider about getting a yearly flu shot. Also ask about pneumococcal or pneumonia shots.  · Wash your hands often. This helps reduce the chance of picking up viruses that cause colds and flu.  Call your healthcare provider if:  · Symptoms worsen, or you have new symptoms  · Breathing problems worsen or  become severe  · Symptoms dont get better within a week, or within 3 days of taking antibiotics   Date Last Reviewed: 2/1/2017  © 5873-9661 [employer]. [address], [place] [address]. All rights reserved. This information is not intended as a substitute for professional medical care. Always follow your healthcare professional's instructions.        Bronchitis with Wheezing (Viral or Bacterial: Adult)    Bronchitis is an infection of the air passages. It often occurs during a cold and is usually caused by a virus. Symptoms include cough with mucus (phlegm) and low-grade fever. This illness is contagious during the first few days and is spread through the air by coughing and sneezing, or by direct contact (touching the sick person and then touching your own eyes, nose, or mouth).  If there is a lot of inflammation, air flow is restricted. The air passages may also go into spasm, especially if you have asthma. This causes wheezing and difficulty breathing even in people who do not have asthma.  Bronchitis usually lasts 7 to 14 days. The wheezing should improve with treatment during the first week. An inhaler is often prescribed to relax the air passages and stop wheezing. Antibiotics will be prescribed if your doctor thinks there is also a secondary bacterial infection.  Home care  · If symptoms are severe, rest at home for the first 2 to 3 days. When you go back to your usual activities, don't let yourself get too tired.  · Do not smoke. Also avoid being exposed to secondhand smoke.  · You may use over-the-counter medicine to control fever or pain, unless another medicine was prescribed. Note: If you have chronic liver or kidney disease or have ever had a stomach ulcer or gastrointestinal bleeding, talk with your healthcare provider before using these medicines. Also talk to your provider if you are taking medicine to prevent blood clots.) Aspirin should never be given to anyone younger than 18 years of age who is ill with a viral infection or fever. It may cause severe liver or brain damage.  · Your appetite may be poor, so a light diet is fine. Avoid dehydration by  drinking 6 to 8 glasses of fluids per day (such as water, soft drinks, sports drinks, juices, tea, or soup). Extra fluids will help loosen secretions in the nose and lungs.  · Over-the-counter cough, cold, and sore-throat medicines will not shorten the length of the illness, but they may be helpful to reduce symptoms. (Note: Do not use decongestants if you have high blood pressure.)  · If you were given an inhaler, use it exactly as directed. If you need to use it more often than prescribed, your condition may be worsening. If this happens, contact your healthcare provider.  · If prescribed, finish all antibiotic medicine, even if you are feeling better after only a few days.  Follow-up care  Follow up with your healthcare provider, or as advised. If you had an X-ray or ECG (electrocardiogram), a specialist will review it. You will be notified of any new findings that may affect your care.  Note: If you are age 65 or older, or if you have a chronic lung disease or condition that affects your immune system, or you smoke, talk to your healthcare provider about having a pneumococcal vaccinations and a yearly influenza vaccination (flu shot).  When to seek medical advice  Call your healthcare provider right away if any of these occur:  · Fever of 100.4°F (38°C) or higher  · Coughing up increasing amounts of colored sputum  · Weakness, drowsiness, headache, facial pain, ear pain, or a stiff neck  Call 911, or get immediate medical care  Contact emergency services right away if any of these occur.  · Coughing up blood  · Worsening weakness, drowsiness, headache, or stiff neck  · Increased wheezing not helped with medication, shortness of breath, or pain with breathing  Date Last Reviewed: 9/13/2015 © 2000-2017 [employer]. [address] [place] [address], [place] [address]. All rights reserved. This information is not intended as a substitute for professional medical care. Always follow your healthcare  professional's instructions.        Middle Ear Infection (Adult)  You have an infection of the middle ear, the space behind the eardrum. This is also called acute otitis media (AOM). Sometimes it is caused by the common cold. This is because congestion can block the internal passage (eustachian tube) that drains fluid from the middle ear. When the middle ear fills with fluid, bacteria can grow there and cause an infection. Oral antibiotics are used to treat this illness, not ear drops. Symptoms usually start to improve within 1 to 2 days of treatment.    Home care  The following are general care guidelines:  · Finish all of the antibiotic medicine given, even though you may feel better after the first few days.  · You may use over-the-counter medicine, such as acetaminophen or ibuprofen, to control pain and fever, unless something else was prescribed. If you have chronic liver or kidney disease or have ever had a stomach ulcer or gastrointestinal bleeding, talk with your healthcare provider before using these medicines. Do not give aspirin to anyone under 18 years of age who has a fever. It may cause severe illness or death.  Follow-up care  Follow up with your healthcare provider, or as advised, in 2 weeks if all symptoms have not gotten better, or if hearing doesn't go back to normal within 1 month.  When to seek medical advice  Call your healthcare provider right away if any of these occur:  · Ear pain gets worse or does not improve after 3 days of treatment  · Unusual drowsiness or confusion  · Neck pain, stiff neck, or headache  · Fluid or blood draining from the ear canal  · Fever of 100.4°F (38°C) or as advised   · Seizure  Date Last Reviewed: 6/1/2016 © 2000-2017 [employer]. [address] [place] [address], [place] [address]. All rights reserved. This information is not intended as a substitute for professional medical care. Always follow your healthcare professional's instructions.        Sinusitis  (Antibiotic Treatment)    The sinuses are air-filled spaces within the bones of the face. They connect to the inside of the nose. Sinusitis is an inflammation of the tissue lining the sinus cavity. Sinus inflammation can occur during a cold. It can also be due to allergies to pollens and other particles in the air. Sinusitis can cause symptoms of sinus congestion and fullness. A sinus infection causes fever, headache and facial pain. There is often green or yellow drainage from the nose or into the back of the throat (post-nasal drip). You have been given antibiotics to treat this condition.  Home care:  · Take the full course of antibiotics as instructed. Do not stop taking them, even if you feel better.  · Drink plenty of water, hot tea, and other liquids. This may help thin mucus. It also may promote sinus drainage.  · Heat may help soothe painful areas of the face. Use a towel soaked in hot water. Or,  the shower and direct the hot spray onto your face. Using a vaporizer along with a menthol rub at night may also help.   · An expectorant containing guaifenesin may help thin the mucus and promote drainage from the sinuses.  · Over-the-counter decongestants may be used unless a similar medicine was prescribed. Nasal sprays work the fastest. Use one that contains phenylephrine or oxymetazoline. First blow the nose gently. Then use the spray. Do not use these medicines more often than directed on the label or symptoms may get worse. You may also use tablets containing pseudoephedrine. Avoid products that combine ingredients, because side effects may be increased. Read labels. You can also ask the pharmacist for help. (NOTE: Persons with high blood pressure should not use decongestants. They can raise blood pressure.)  · Over-the-counter antihistamines may help if allergies contributed to your sinusitis.    · Do not use nasal rinses or irrigation during an acute sinus infection, unless told to by your health  care provider. Rinsing may spread the infection to other sinuses.  · Use acetaminophen or ibuprofen to control pain, unless another pain medicine was prescribed. (If you have chronic liver or kidney disease or ever had a stomach ulcer, talk with your doctor before using these medicines. Aspirin should never be used in anyone under 18 years of age who is ill with a fever. It may cause severe liver damage.)  · Don't smoke. This can worsen symptoms.  Follow-up care  Follow up with your healthcare provider or our staff if you are not improving within the next week.  When to seek medical advice  Call your healthcare provider if any of these occur:  · Facial pain or headache becoming more severe  · Stiff neck  · Unusual drowsiness or confusion  · Swelling of the forehead or eyelids  · Vision problems, including blurred or double vision  · Fever of 100.4ºF (38ºC) or higher, or as directed by your healthcare provider  · Seizure  · Breathing problems  · Symptoms not resolving within 10 days  Date Last Reviewed: 4/13/2015  © 4144-9213 [employer] [address] [place] [address], [place] [address]. All rights reserved. This information is not intended as a substitute for professional medical care. Always follow your healthcare professional's instructions.

## 2019-01-09 ENCOUNTER — OFFICE VISIT (OUTPATIENT)
Dept: DERMATOLOGY | Facility: CLINIC | Age: 74
End: 2019-01-09
Payer: MEDICARE

## 2019-01-09 ENCOUNTER — PATIENT OUTREACH (OUTPATIENT)
Dept: OTHER | Facility: OTHER | Age: 74
End: 2019-01-09

## 2019-01-09 DIAGNOSIS — D48.5 NEOPLASM OF UNCERTAIN BEHAVIOR OF SKIN: Primary | ICD-10-CM

## 2019-01-09 DIAGNOSIS — Z85.828 PERSONAL HISTORY OF OTHER MALIGNANT NEOPLASM OF SKIN: ICD-10-CM

## 2019-01-09 DIAGNOSIS — L57.8 CHRONIC SOLAR DERMATITIS: ICD-10-CM

## 2019-01-09 DIAGNOSIS — L57.0 AK (ACTINIC KERATOSIS): ICD-10-CM

## 2019-01-09 DIAGNOSIS — L82.1 SK (SEBORRHEIC KERATOSIS): ICD-10-CM

## 2019-01-09 PROCEDURE — 99999 PR PBB SHADOW E&M-EST. PATIENT-LVL III: CPT | Mod: PBBFAC,HCNC,, | Performed by: DERMATOLOGY

## 2019-01-09 PROCEDURE — 1101F PT FALLS ASSESS-DOCD LE1/YR: CPT | Mod: CPTII,HCNC,S$GLB, | Performed by: DERMATOLOGY

## 2019-01-09 PROCEDURE — 11103 TANGNTL BX SKIN EA SEP/ADDL: CPT | Mod: HCNC,S$GLB,, | Performed by: DERMATOLOGY

## 2019-01-09 PROCEDURE — 11102 PR TANGENTIAL BIOPSY, SKIN, SINGLE LESION: ICD-10-PCS | Mod: HCNC,S$GLB,, | Performed by: DERMATOLOGY

## 2019-01-09 PROCEDURE — 17000 PR DESTRUCTION(LASER SURGERY,CRYOSURGERY,CHEMOSURGERY),PREMALIGNANT LESIONS,FIRST LESION: ICD-10-PCS | Mod: 59,HCNC,S$GLB, | Performed by: DERMATOLOGY

## 2019-01-09 PROCEDURE — 11103 PR TANGENTIAL BIOPSY, SKIN, EA ADDTL LESION: ICD-10-PCS | Mod: HCNC,S$GLB,, | Performed by: DERMATOLOGY

## 2019-01-09 PROCEDURE — 11102 TANGNTL BX SKIN SINGLE LES: CPT | Mod: HCNC,S$GLB,, | Performed by: DERMATOLOGY

## 2019-01-09 PROCEDURE — 17000 DESTRUCT PREMALG LESION: CPT | Mod: 59,HCNC,S$GLB, | Performed by: DERMATOLOGY

## 2019-01-09 PROCEDURE — 88305 TISSUE EXAM BY PATHOLOGIST: CPT | Mod: 26,HCNC,, | Performed by: PATHOLOGY

## 2019-01-09 PROCEDURE — 99213 OFFICE O/P EST LOW 20 MIN: CPT | Mod: 25,HCNC,S$GLB, | Performed by: DERMATOLOGY

## 2019-01-09 PROCEDURE — 99999 PR PBB SHADOW E&M-EST. PATIENT-LVL III: ICD-10-PCS | Mod: PBBFAC,HCNC,, | Performed by: DERMATOLOGY

## 2019-01-09 PROCEDURE — 17003 DESTRUCT PREMALG LES 2-14: CPT | Mod: HCNC,S$GLB,, | Performed by: DERMATOLOGY

## 2019-01-09 PROCEDURE — 88305 TISSUE SPECIMEN TO PATHOLOGY, DERMATOLOGY: ICD-10-PCS | Mod: 26,HCNC,, | Performed by: PATHOLOGY

## 2019-01-09 PROCEDURE — 17003 DESTRUCTION, PREMALIGNANT LESIONS; SECOND THROUGH 14 LESIONS: ICD-10-PCS | Mod: HCNC,S$GLB,, | Performed by: DERMATOLOGY

## 2019-01-09 PROCEDURE — 99213 PR OFFICE/OUTPT VISIT, EST, LEVL III, 20-29 MIN: ICD-10-PCS | Mod: 25,HCNC,S$GLB, | Performed by: DERMATOLOGY

## 2019-01-09 PROCEDURE — 88305 TISSUE EXAM BY PATHOLOGIST: CPT | Mod: HCNC,59 | Performed by: PATHOLOGY

## 2019-01-09 PROCEDURE — 1101F PR PT FALLS ASSESS DOC 0-1 FALLS W/OUT INJ PAST YR: ICD-10-PCS | Mod: CPTII,HCNC,S$GLB, | Performed by: DERMATOLOGY

## 2019-01-09 NOTE — PROGRESS NOTES
Subjective:       Patient ID:  Janusz Montgomery Jr. is a 73 y.o. male who presents for   Chief Complaint   Patient presents with    Skin Check     TBSE, follow up efudex     History of Present Illness: The patient presents for follow up of skin check.    The patient was last seen on: 10/8/18 for cryosurgery to actinic keratoses which have resolved. He also recently finished a 4 week course of efudex to forehead and right shoulder for BCCs x 2 (see path below).   This is a high risk patient here to check for the development of new lesions.    Other skin complaints: none    FINAL PATHOLOGIC DIAGNOSIS  1. Skin, forehead, shave biopsy:  - BASAL CELL CARCINOMA.    2. Skin, right medial shoulder, shave biopsy:  - BASAL CELL CARCINOMA WITH MIXED SUPERFICIAL AND NODULAR GROWTH PATTERN.  - THE TUMOR EXTENDS TO A LATERAL BIOPSY MARGIN.Notes recorded by Bianca Kumar MD on 10/16/2018 at 6:08 PM CDT  FINAL PATHOLOGIC DIAGNOSIS          Review of Systems   Constitutional: Negative for fever, chills and weight loss (wt # 360# - stable).   Skin: Positive for daily sunscreen use and activity-related sunscreen use ( rarely outdoors). Negative for itching, rash, recent sunburn and wears hat ( when outdoors for long periods of time).   Hematologic/Lymphatic: Bruises/bleeds easily (takes aspirin).        Objective:    Physical Exam   Constitutional: He appears well-developed and well-nourished. He is obese.  No distress.   HENT:   Mouth/Throat: Lips normal.    Eyes: Lids are normal.  No conjunctival no injection.   Cardiovascular: There is dependent edema (legs).     Lymphadenopathy:        Head (right side): No submental, no submandibular, no preauricular, no posterior auricular and no occipital adenopathy present.        Head (left side): No submental, no submandibular, no preauricular, no posterior auricular and no occipital adenopathy present.     He has no axillary adenopathy.   Neurological: He is alert and oriented to  person, place, and time. He is not disoriented.   Psychiatric: He has a normal mood and affect.   Skin:   Areas Examined (abnormalities noted in diagram):   Scalp / Hair Palpated and Inspected  Head / Face Inspection Performed  Neck Inspection Performed  Chest / Axilla Inspection Performed  Back Inspection Performed  RUE Inspected  LUE Inspection Performed  Nails and Digits Inspection Performed                       Diagram Legend     Erythematous scaling macule/papule c/w actinic keratosis       Vascular papule c/w angioma      Pigmented verrucoid papule/plaque c/w seborrheic keratosis      Yellow umbilicated papule c/w sebaceous hyperplasia      Irregularly shaped tan macule c/w lentigo     1-2 mm smooth white papules consistent with Milia      Movable subcutaneous cyst with punctum c/w epidermal inclusion cyst      Subcutaneous movable cyst c/w pilar cyst      Firm pink to brown papule c/w dermatofibroma      Pedunculated fleshy papule(s) c/w skin tag(s)      Evenly pigmented macule c/w junctional nevus     Mildly variegated pigmented, slightly irregular-bordered macule c/w mildly atypical nevus      Flesh colored to evenly pigmented papule c/w intradermal nevus       Pink pearly papule/plaque c/w basal cell carcinoma      Erythematous hyperkeratotic cursted plaque c/w SCC      Surgical scar with no sign of skin cancer recurrence      Open and closed comedones      Inflammatory papules and pustules      Verrucoid papule consistent consistent with wart     Erythematous eczematous patches and plaques     Dystrophic onycholytic nail with subungual debris c/w onychomycosis     Umbilicated papule    Erythematous-base heme-crusted tan verrucoid plaque consistent with inflamed seborrheic keratosis     Erythematous Silvery Scaling Plaque c/w Psoriasis     See annotation              Assessment / Plan:      Pathology Orders:     Normal Orders This Visit    Tissue Specimen To Pathology, Dermatology     Questions:     Directional Terms:  Other(comment)    Clinical information:  r/o bcc    Specific Site:  left nasal bridge    Tissue Specimen To Pathology, Dermatology     Questions:    Directional Terms:  Other(comment)    Clinical information:  r/o bcc    Specific Site:  left post ear        Neoplasm of uncertain behavior of skin  -     Tissue Specimen To Pathology, Dermatology  -     Tissue Specimen To Pathology, Dermatology    Shave biopsy procedure note:    Shave biopsy x 2 performed after verbal consent including risk of infection, scar, recurrence, need for additional treatment of site. Area prepped with alcohol, anesthetized with approximately 1.0cc of 1% lidocaine with epinephrine. Lesional tissue shaved with razor blade. Hemostasis achieved with application of aluminum chloride followed by hyfrecation. No complications. Dressing applied. Wound care explained.    If biopsy positive for malignancy, refer to Dr. Mcclure for Mohs surgery consultation.      AK (actinic keratosis)  Cryosurgery Procedure Note    Verbal consent from the patient is obtained including, but not limited to, risk of hypopigmentation/hyperpigmentation, scar, recurrence of lesion. The patient is aware of the precancerous quality and need for treatment of these lesions. Liquid nitrogen cryosurgery is applied to the 4 actinic keratoses, as detailed in the physical exam, to produce a freeze injury. The patient is aware that blisters may form and is instructed on wound care with gentle cleansing and use of vaseline ointment to keep moist until healed. The patient is supplied a handout on cryosurgery and is instructed to call if lesions do not completely resolve.      Chronic solar dermatitis  Encourage Am Lactin lotion or cream to arms and hands nightly. Available over-the counter.      SK (seborrheic keratosis)   - stable and chronic      Personal history of other malignant neoplasm of skin  Area(s) of previous NMSC evaluated with no signs of  recurrence.    Upper body skin examination performed today including at least 6 points as noted in physical examination. Suspicious lesions noted.               Follow-up in about 6 months (around 7/9/2019).

## 2019-01-09 NOTE — PROGRESS NOTES
"Last 5 Patient Entered Readings                                      Current 30 Day Average: 142/68     Recent Readings 12/23/2018 12/18/2018 12/17/2018 11/6/2018 11/1/2018    SBP (mmHg) 146 140 141 134 139    DBP (mmHg) 67 68 68 59 69    Pulse 75 75 71 86 83          HPI:  Called patient to follow up. Patient endorses adherence to medication regimen. Brief encounter as patient on his way to appointments. States he went to [facility], cuff was working initially but he now is having trouble. States he thinks it is " error."     Patient denies hypotensive s/sx (lightheadedness, dizziness, nausea, fatigue); patient denies hypertensive s/sx (SOB, CP, severe headaches, changes in vision, dizziness, fatigue, confusion, anxiety, nosebleeds).     Assessment:  Reviewed recent readings. Per 2017 ACC/ AHA HTN guidelines (goal of BP < 130/80), current 30-day average need to be addressed more throroughly today.     Plan:  He is amenable to changing ramipril to ARB, has 1-2 weeks remaining of ramipril. I requested some more BP readings to better determine dose, anticipating olmesartan 20 mg daily or equivalent. I will continue to monitor regularly and will follow-up in 1 to 2 weeks, sooner if blood pressure begins to trend upward or downward.     Current medication regimen:  Hypertension Medications             furosemide (LASIX) 20 MG tablet Take 1 tablet (20 mg total) by mouth once daily.    ramipril (ALTACE) 5 MG capsule Take 1 capsule (5 mg total) by mouth once daily.          Patient denies having questions or concerns. Patient has my contact information and knows to call with any concerns or clinical changes.    "

## 2019-01-09 NOTE — PATIENT INSTRUCTIONS
" Shave Biopsy Wound Care    Your doctor has performed a shave biopsy today.  A band aid and vaseline ointment has been placed over the site.  This should remain in place for 24 hours.  It is recommended that you keep the area dry for the first 24 hours.  After 24 hours, you may remove the band aid and wash the area with warm soap and water and apply Vaseline jelly.  Many patients prefer to use Neosporin or Bacitracin ointment.  This is acceptable; however, know that you can develop an allergy to this medication even if you have used it safely for years.  It is important to keep the area moist.  Letting it dry out and get air slows healing time, and will worsen the scar.  Band aid is optional after first 24 hours.      If you notice increasing redness, tenderness, pain, or yellow drainage at the biopsy site, please notify your doctor.  These are signs of an infection.    If your biopsy site is bleeding, apply firm pressure for 15 minutes straight.  Repeat for another 15 minutes, if it is still bleeding.   If the surgical site continues to bleed, then please contact your doctor.      For [facility] users:   You will receive a [facility] notification after the pathologist has finished reviewing your biopsy specimen. Pathology results, however, will not be released online so you will see a "no content" message. Once your dermatologist reviews and clinically correlates your biopsy results, you will either receive a letter in the mail with the results of a phone call from your doctor's office if further explanation or treatment is warranted.       [address] [place] [address]/ [phone] FAX/ [email or web address]      CRYOSURGERY      Your doctor has used a method called cryosurgery to treat your skin condition. Cryosurgery refers to the use of very cold substances to treat a variety of skin conditions such as warts, pre-skin cancers, molluscum contagiosum, sun spots, and several benign " growths. The substance we use in cryosurgery is liquid nitrogen and is so cold (-195 degrees Celsius) that is burns when administered.     Following treatment in the office, the skin may immediately burn and become red. You may find the area around the lesion is affected as well. It is sometimes necessary to treat not only the lesion, but a small area of the surrounding normal skin to achieve a good response.     A blister, and even a blood filled blister, may form after treatment.   This is a normal response. If the blister is painful, it is acceptable to sterilize a needle and with rubbing alcohol and gently pop the blister. It is important that you gently wash the area with soap and warm water as the blister fluid may contain wart virus if a wart was treated. Do no remove the roof of the blister.     The area treated can take anywhere from 1-3 weeks to heal. Healing time depends on the kind skin lesion treated, the location, and how aggressively the lesion was treated. It is recommended that the areas treated are covered with Vaseline or bacitracin ointment and a band-aid. If a band-aid is not practical, just ointment applied several times per day will do. Keeping these areas moist will speed the healing time.    Treatment with liquid nitrogen can leave a scar. In dark skin, it may be a light or dark scar, in light skin it may be a white or pink scar. These will generally fade with time, but may never go away completely.     If you have any concerns after your treatment, please feel free to call the office.       [facility] [place] [address]/ [phone] FAX/ [email or web address]

## 2019-01-16 ENCOUNTER — PATIENT OUTREACH (OUTPATIENT)
Dept: OTHER | Facility: OTHER | Age: 74
End: 2019-01-16

## 2019-01-16 DIAGNOSIS — I10 ESSENTIAL HYPERTENSION: Primary | ICD-10-CM

## 2019-01-16 RX ORDER — OLMESARTAN MEDOXOMIL 20 MG/1
20 TABLET ORAL DAILY
Qty: 30 TABLET | Refills: 5 | Status: SHIPPED | OUTPATIENT
Start: 2019-01-16 | End: 2019-06-26 | Stop reason: SDUPTHER

## 2019-01-16 NOTE — PROGRESS NOTES
Last 5 Patient Entered Readings                                      Current 30 Day Average: 143/67     Recent Readings 1/15/2019 1/10/2019 12/23/2018 12/18/2018 12/17/2018    SBP (mmHg) 126 161 146 140 141    DBP (mmHg) 59 72 67 68 68    Pulse 75 81 75 75 71        HPI:  Called patient to follow up. Patient endorses adherence to medication regimen. Patient denies hypotensive s/sx (lightheadedness, dizziness, nausea, fatigue); patient denies hypertensive s/sx (SOB, CP, severe headaches, changes in vision, dizziness, fatigue, confusion, anxiety, nosebleeds).     Assessment:  Reviewed recent readings. Per 2017 ACC/ AHA HTN guidelines (goal of BP < 130/80), current 30-day average needs to be addressed more thoroughly today.     Plan:  Change ramipril to olmesartan 20 mg daily. Reminded patient to take BP reading an hour or more after medication and to rest 5 minutes prior to measurement. I will continue to monitor regularly and will follow-up in 2 to 3 weeks, sooner if blood pressure begins to trend upward or downward.     Current medication regimen:  Hypertension Medications             furosemide (LASIX) 20 MG tablet Take 1 tablet (20 mg total) by mouth once daily.    ramipril (ALTACE) 5 MG capsule Take 1 capsule (5 mg total) by mouth once daily.          Patient denies having questions or concerns. Patient has my contact information and knows to call with any concerns or clinical changes.

## 2019-01-17 ENCOUNTER — OFFICE VISIT (OUTPATIENT)
Dept: PODIATRY | Facility: CLINIC | Age: 74
End: 2019-01-17
Payer: MEDICARE

## 2019-01-17 VITALS
WEIGHT: 315 LBS | BODY MASS INDEX: 41.75 KG/M2 | SYSTOLIC BLOOD PRESSURE: 155 MMHG | DIASTOLIC BLOOD PRESSURE: 73 MMHG | HEIGHT: 73 IN

## 2019-01-17 DIAGNOSIS — L84 PRE-ULCERATIVE CALLUSES: ICD-10-CM

## 2019-01-17 DIAGNOSIS — I87.2 VENOUS STASIS DERMATITIS OF BOTH LOWER EXTREMITIES: ICD-10-CM

## 2019-01-17 DIAGNOSIS — L90.9 PLANTAR FAT PAD ATROPHY: ICD-10-CM

## 2019-01-17 DIAGNOSIS — I73.9 PVD (PERIPHERAL VASCULAR DISEASE): Primary | ICD-10-CM

## 2019-01-17 DIAGNOSIS — I87.8 VENOUS STASIS OF LOWER EXTREMITY: ICD-10-CM

## 2019-01-17 DIAGNOSIS — Z87.2 HEALED ULCER OF LEFT FOOT ON EXAMINATION: ICD-10-CM

## 2019-01-17 PROCEDURE — 1101F PR PT FALLS ASSESS DOC 0-1 FALLS W/OUT INJ PAST YR: ICD-10-PCS | Mod: CPTII,HCNC,S$GLB, | Performed by: PODIATRIST

## 2019-01-17 PROCEDURE — 99999 PR PBB SHADOW E&M-EST. PATIENT-LVL III: ICD-10-PCS | Mod: PBBFAC,HCNC,, | Performed by: PODIATRIST

## 2019-01-17 PROCEDURE — 3077F SYST BP >= 140 MM HG: CPT | Mod: CPTII,HCNC,S$GLB, | Performed by: PODIATRIST

## 2019-01-17 PROCEDURE — 99499 RISK ADDL DX/OHS AUDIT: ICD-10-PCS | Mod: HCNC,S$GLB,, | Performed by: PODIATRIST

## 2019-01-17 PROCEDURE — 3077F PR MOST RECENT SYSTOLIC BLOOD PRESSURE >= 140 MM HG: ICD-10-PCS | Mod: CPTII,HCNC,S$GLB, | Performed by: PODIATRIST

## 2019-01-17 PROCEDURE — 99214 PR OFFICE/OUTPT VISIT, EST, LEVL IV, 30-39 MIN: ICD-10-PCS | Mod: HCNC,S$GLB,, | Performed by: PODIATRIST

## 2019-01-17 PROCEDURE — 1101F PT FALLS ASSESS-DOCD LE1/YR: CPT | Mod: CPTII,HCNC,S$GLB, | Performed by: PODIATRIST

## 2019-01-17 PROCEDURE — 99499 UNLISTED E&M SERVICE: CPT | Mod: HCNC,S$GLB,, | Performed by: PODIATRIST

## 2019-01-17 PROCEDURE — 3078F DIAST BP <80 MM HG: CPT | Mod: CPTII,HCNC,S$GLB, | Performed by: PODIATRIST

## 2019-01-17 PROCEDURE — 99999 PR PBB SHADOW E&M-EST. PATIENT-LVL III: CPT | Mod: PBBFAC,HCNC,, | Performed by: PODIATRIST

## 2019-01-17 PROCEDURE — 3078F PR MOST RECENT DIASTOLIC BLOOD PRESSURE < 80 MM HG: ICD-10-PCS | Mod: CPTII,HCNC,S$GLB, | Performed by: PODIATRIST

## 2019-01-17 PROCEDURE — 99214 OFFICE O/P EST MOD 30 MIN: CPT | Mod: HCNC,S$GLB,, | Performed by: PODIATRIST

## 2019-01-18 ENCOUNTER — CLINICAL SUPPORT (OUTPATIENT)
Dept: REHABILITATION | Facility: HOSPITAL | Age: 74
End: 2019-01-18
Payer: MEDICARE

## 2019-01-18 DIAGNOSIS — R26.9 GAIT ABNORMALITY: ICD-10-CM

## 2019-01-18 DIAGNOSIS — M25.672 DECREASED RANGE OF MOTION OF LEFT ANKLE: ICD-10-CM

## 2019-01-18 DIAGNOSIS — R26.89 BALANCE PROBLEMS: ICD-10-CM

## 2019-01-18 DIAGNOSIS — M25.60 RESTRICTION OF JOINT MOTION: ICD-10-CM

## 2019-01-18 DIAGNOSIS — M67.01 HEEL CORD TIGHTNESS, RIGHT: ICD-10-CM

## 2019-01-18 DIAGNOSIS — Z09 S/P ORTHOPEDIC SURGERY, FOLLOW-UP EXAM: Primary | ICD-10-CM

## 2019-01-18 DIAGNOSIS — Z74.09 IMPAIRED MOBILITY: ICD-10-CM

## 2019-01-18 PROCEDURE — 97161 PT EVAL LOW COMPLEX 20 MIN: CPT | Mod: HCNC,PN | Performed by: PHYSICAL THERAPIST
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
## 2019-01-20 NOTE — PROGRESS NOTES
Subjective:      Patient ID: Janusz Montgomery Jr. is a 73 y.o. male.    Chief Complaint: Wound Care (left heel (PCP Dr Weathers 11/16/18))    Janusz Montgomery Jr. is a 73 y.o. male returns to clinic for follow up of left foot and ankle ulcers. Patient's wife has been inspecting foot daily.  He has been caring for all lesions as instructed but admits that he has not been moisturizing skin daily.  He relates that for the last several days he has been having significant pain to his posterior heel and fear his ulcer may have reopened.  He also complains of consistent swelling and discoloration to his left leg    Shoe gear:  Tennis shoes    Chief Complaint   Patient presents with    Wound Care     left heel (PCP Dr Weathers 11/16/18)       Hemoglobin A1C   Date Value Ref Range Status   07/11/2016 5.3 4.5 - 6.2 % Final     Comment:     According to ADA guidelines, hemoglobin A1C <7.0% represents  optimal control in non-pregnant diabetic patients.  Different  metrics may apply to specific populations.   Standards of Medical Care in Diabetes - 2016.  For the purpose of screening for the presence of diabetes:  <5.7%     Consistent with the absence of diabetes  5.7-6.4%  Consistent with increasing risk for diabetes   (prediabetes)  >or=6.5%  Consistent with diabetes  Currently no consensus exists for use of hemoglobin A1C  for diagnosis of diabetes for children.     03/31/2015 5.3 4.5 - 6.2 % Final   03/03/2014 5.6 4.5 - 6.2 % Final         Current Outpatient Medications on File Prior to Visit   Medication Sig Dispense Refill    acyclovir (ZOVIRAX) 800 MG Tab Take 1 tablet (800 mg total) by mouth 2 (two) times daily. 180 tablet 6    albuterol (PROVENTIL/VENTOLIN HFA) 90 mcg/actuation inhaler Inhale 2 puffs into the lungs every 6 (six) hours as needed for Wheezing. Rescue 18 g 0    apixaban 5 mg Tab Take 1 tablet (5 mg total) by mouth 2 (two) times daily. 60 tablet 11    artificial tears (ISOPTO TEARS) 0.5 % ophthalmic  solution Place 1 drop into both eyes as needed. (Patient taking differently: Place 1 drop into both eyes as needed (for dry eyes). )      aspirin (ECOTRIN) 81 MG EC tablet Take 1 tablet (81 mg total) by mouth once daily.  0    atorvastatin (LIPITOR) 40 MG tablet Take 1 tablet (40 mg total) by mouth once daily. 90 tablet 1    azelastine (ASTELIN) 137 mcg (0.1 %) nasal spray 2 sprays (274 mcg total) by Nasal route 2 (two) times daily. 30 mL 0    CYANOCOBALAMIN, VITAMIN B-12, (VITAMIN B-12 ORAL) Take 2,500 mcg by mouth once daily.      furosemide (LASIX) 20 MG tablet Take 1 tablet (20 mg total) by mouth once daily. 90 tablet 0    gabapentin (NEURONTIN) 300 MG capsule Take 1 capsule (300 mg total) by mouth 2 (two) times daily. 60 capsule 11    [] guaiFENesin (MUCINEX) 600 mg 12 hr tablet Take 1 tablet (600 mg total) by mouth 2 (two) times daily. for 15 days 30 tablet 0    honey (MEDIHONEY, HONEY,) 100 % Pste Apply 1 application topically 2 (two) times daily as needed. 1 Tube 3    influenza (FLUZONE HIGH-DOSE) 180 mcg/0.5 mL vaccine Inject into the muscle. 0.5 mL 0    melatonin 3 mg Tab Take 6 mg by mouth nightly as needed (for sleep).       multivitamin (MULTIPLE VITAMINS) per tablet Take 1 tablet by mouth once daily.      olmesartan (BENICAR) 20 MG tablet Take 1 tablet (20 mg total) by mouth once daily. (replaces ramipril for blood pressure) 30 tablet 5    omeprazole (PRILOSEC) 20 MG capsule Take 1 capsule (20 mg total) by mouth daily as needed. 90 capsule 0    sildenafil (VIAGRA) 100 MG tablet Take 1 tablet (100 mg total) by mouth daily as needed for Erectile Dysfunction. 10 tablet 11    tamsulosin (FLOMAX) 0.4 mg Cp24 TAKE ONE CAPSULE BY MOUTH ONCE DAILY 90 capsule 3    venlafaxine (EFFEXOR) 75 MG tablet TAKE TWO TABLETS BY MOUTH TWICE DAILY 360 tablet 0    vitamin D 1000 units Tab Take 1 tablet (1,000 Units total) by mouth once daily.      zinc sulfate (ZINCATE) 220 (50) mg capsule Take 1  capsule (220 mg total) by mouth once daily.       No current facility-administered medications on file prior to visit.        Allergies   Allergen Reactions    Ciprofloxacin Rash     Diffuse pruritic morbilliform rash developed 3/15/2017 after dose of cipro; previously in 2/2017 he had rash/fevers after initiation of cipro    Zosyn [Piperacillin-Tazobactam] Anaphylaxis     Diffuse pruritic morbilliform rash developed 3/15/2017.  Then, 430am dose on 3/16 and rash worsened with SOB/tachypnea but no hypoxemia.     Bacitracin Itching and Rash     Violaceous rash in area of topical Tx.        Past Surgical History:   Procedure Laterality Date    APPLICATION-EXTERNAL FIXATION DEVICE, ankle Left 12/28/2016    Performed by Walter [facility] at [facility] OR [facility]    Cardiac stenting x2      CATARACT EXTRACTION W/  INTRAOCULAR LENS IMPLANT Right 3/29/2016    Dr. Conteh    CATARACT EXTRACTION W/  INTRAOCULAR LENS IMPLANT Left 4/12/2016        DEBRIDEMENT-FOOT Left 4/13/2017    Performed by [facility] at [facility]    EXTERNAL FIXATION TIBIAL FRACTURE Left 07/01/2016    FULL THICKNESS SKIN GRAFT from left shoulder Left 12/8/2015    Performed by Colt Sosa [facility] at [facility]    INSERTION, RADIOACTIVE SEED, PROSTATE N/A 8/8/2018    Performed by Bipin Thompson MD at [facility] OR [facility]LR    INSERTION-INTRAOCULAR LENS (IOL) Left 4/12/2016    Performed by Pool Con[facility]h [facility] at [facility] OR [facility]    INSERTION-INTRAOCULAR LENS (IOL) Right 3/29/2016    Performed by Pool [facility]teh [facility] at [facility] OR [facility]LR    MAPPING-ULTRASOUND  6/13/2018    Performed by Bipin Thompson [facility] at [facility] OR [facility]ST [facility]LR    OPEN REDUCTION INTERNAL UEPMAWHA-MXOURP-RSDYD/FIBULA Left 4/13/2017    Performed by Walter [facility] at [facility] OR [facility]    OPEN REDUCTION INTERNAL FIXATION-TIBIA Left 7/15/2016    Performed by [facility] at [facility]    ORIF TIBIA FRACTURE Left 07/15/2016     PHACOEMULSIFICATION-ASPIRATION-CATARACT Left 4/12/2016    [facility] Pool [facility]    PHACOEMULSIFICATION-ASPIRATION-CATARACT Right 3/29/2016    Performed by Pool Conteh [facility] at [facility]    REDUCTION-CLOSED-TIBIA Left 12/28/2016    Performed by [facility] at [facility]    REMOVAL OF EXTERNAL FIXATION DEVICE Left 7/15/2016    Performed by [facility] at [facility]    REMOVAL OF EXTERNAL FIXATION DEVICE, LEFT ANKLE C ARM Left 4/13/2017    Performed by [facility] at [facility]    REMOVAL-HARDWARE-LEG Left 4/13/2017    Performed by Walter [facility] at [facility]    REPAIR-MOHS DEFECT Basal Cell Left Lateral Eyebrow Left 12/8/2015    Performed by Colt [facility]osa [facility] at [facility]ND [facility]    Squamous cell cancer removal x3 with Mohs surgery      TONSILLECTOMY      TOTAL KNEE ARTHROPLASTY  10/2012    trus/bx         Family History   Problem Relation Age of Onset    Skin cancer Father     Lung cancer Father     Cancer Father         smoker,     Alzheimer's disease Mother     Hypertension Mother     Cancer Sister         colon, lung cancer     Cancer Brother         skin cancer, polypectomy     Peripheral vascular disease Unknown     Melanoma Neg Hx     Psoriasis Neg Hx     Lupus Neg Hx     Eczema Neg Hx     Amblyopia Neg Hx     Blindness Neg Hx     Cataracts Neg Hx     Diabetes Neg Hx     Glaucoma Neg Hx     Macular degeneration Neg Hx     Retinal detachment Neg Hx     Strabismus Neg Hx     Stroke Neg Hx     Thyroid disease Neg Hx     Acne Neg Hx        Social History     Socioeconomic History    Marital status:      Spouse name: Not on file    Number of children: Not on file    Years of education: Not on file    Highest education level: Not on file   Social Needs    Financial resource strain: Not on file    Food insecurity - worry: Not on file    Food insecurity -  "inability: Not on file    Transportation needs - medical: Not on file    Transportation needs - non-medical: Not on file   Occupational History    Occupation: Retired    Tobacco Use    Smoking status: Never Smoker    Smokeless tobacco: Never Used   Substance and Sexual Activity    Alcohol use: Yes     Comment: occasionally    Drug use: No    Sexual activity: Yes   Other Topics Concern    Not on file   Social History Narrative    Not on file       Review of Systems   Constitution: Negative for chills, fever and weakness.   Cardiovascular: Negative for claudication and leg swelling.   Respiratory: Negative for cough and shortness of breath.    Skin: Positive for dry skin and poor wound healing. Negative for itching and rash.   Musculoskeletal: Positive for arthritis, joint pain, joint swelling and myalgias. Negative for falls and muscle weakness.   Gastrointestinal: Negative for diarrhea, nausea and vomiting.   Neurological: Positive for paresthesias. Negative for numbness and tremors.   Psychiatric/Behavioral: Negative for altered mental status and hallucinations.         Objective:       Vitals:    01/17/19 1440   BP: (!) 155/73   Weight: (!) 164.2 kg (362 lb)   Height: 6' 1" (1.854 m)   PainSc:   3       Physical Exam   Constitutional:  Non-toxic appearance. He does not have a sickly appearance. No distress.   Pt. is well-developed, well-nourished, appears stated age, in no acute distress, alert and oriented x 3. No evidence of depression, anxiety, or agitation. Calm, cooperative, and communicative. Appropriate interactions and affect.   Cardiovascular:   Pulses:       Dorsalis pedis pulses are 2+ on the right side, and 2+ on the left side.        Posterior tibial pulses are 1+ on the right side, and 1+ on the left side.   There is decreased digital hair. Skin is atrophic, slightly hyperpigmented, and pitting edema nora (L>>R)     Pulmonary/Chest: No respiratory distress.   Musculoskeletal:        Right " ankle: He exhibits swelling. No tenderness. No lateral malleolus, no medial malleolus, no AITFL, no CF ligament, no head of 5th metatarsal and no proximal fibula tenderness found. Achilles tendon exhibits no pain, no defect and normal Zabala's test results.        Left ankle: He exhibits swelling. No tenderness. No lateral malleolus, no medial malleolus, no AITFL, no CF ligament and no posterior TFL tenderness found. Achilles tendon exhibits no pain, no defect and normal Zabala's test results.        Right foot: There is no tenderness and no bony tenderness.        Left foot: There is no tenderness and no bony tenderness.   Decreased stride, station of gait.  apropulsive toe off.  Increased angle and base of gait.    Patient has hammertoes of digits 2-5 bilateral partially reducible without symptom today.     There is equinus deformity bilateral. There is limitation of dorsiflexion with knees extended and with knees flexed.    Shoes reveals lateral heel counter wear bilateral in athletic shoes.        Lymphadenopathy:   No lymphatic streaking    Negative lymphadenopathy bilateral popliteal fossa and tarsal tunnel.     Neurological:   [place] 5.07 monofilament is intact bilateral feet. Sharp/dull sensation is also intact Bilateral feet. Proprioception is grossly intact. Vibratory sensation intact (pt able to sense vibration stop within 3-5 seconds)     Skin: Skin is warm and dry. Lesion and rash noted. No bruising, no burn and no laceration noted. He is not diaphoretic. There is erythema. No cyanosis. No pallor. Nails show no clubbing.   Ulcer location: medial left ankle  Signs of infection: none  Drainage: none  Purulence: no  Crepitus/fluctuance: no  Periwound: Reddened  Base: thin epithelial itssue  Depth: skin  Probe to bone: no      Ulcer location: posterior left heel  Signs of infection:none  Drainage:  None  Periwound: intact  Base: thin epithelial with bleeding within layers    Dermatitis and  erythema noted to posterior left heel   Psychiatric: His mood appears not anxious. His affect is not inappropriate. His speech is not slurred. He is not combative. He is communicative. He is attentive.   Nursing note reviewed.        Assessment:       Encounter Diagnoses   Name Primary?    PVD (peripheral vascular disease) Yes    Venous stasis dermatitis of both lower extremities     Venous stasis of lower extremity     Pre-ulcerative calluses     Plantar fat pad atrophy     Healed ulcer of left foot on examination          Plan:       Janusz was seen today for wound care.    Diagnoses and all orders for this visit:    PVD (peripheral vascular disease)  -     COMPRESSION STOCKINGS    Venous stasis dermatitis of both lower extremities  -     COMPRESSION STOCKINGS    Venous stasis of lower extremity  -     COMPRESSION STOCKINGS    Pre-ulcerative calluses    Plantar fat pad atrophy    Healed ulcer of left foot on examination      I counseled the patient on his conditions, their implications and medical management.    The site is cleansed of foreign material as much as possible. The patient is alerted to watch for any signs of infection (redness, pus, pain, increased swelling or fever) and call if such occurs.    All wounds have has remained healed but with significant callus formation and central coring to the lesion on the posterior left heel. I advised patient to check feet daily for signs of drainage or lesion re-opening He may need to come more often as a proc B between scheduled visits to keep callus at bay    His shoes may be the culprit    Discussed use of daily foot moisturizer to feet, avoiding the webspace's    - Tubigrips to BLE; patient is to elevate legs. When sleeping, place a pillow under lower extremities. When sitting, support the legs so that they are level with the waist.    rx compression stockings    Follow-up:Patient is to return to the clinic in 9-12 weeks for follow-up but should call  [facility] immediately if any signs of infection, such as fever, chills, sweats, increased redness or pain.    Short-term goals include maintaining good offloading and minimizing bioburden, promoting granulation and epithelialization to healing.  Long-term goals include keeping the wound healed by good offloading and medical management under the direction of internist.

## 2019-01-22 DIAGNOSIS — Z46.9 FITTING AND ADJUSTMENT OF DEVICE: Primary | ICD-10-CM

## 2019-01-22 NOTE — PROGRESS NOTES
Last 5 Patient Entered Readings                                      Current 30 Day Average: 145/71     Recent Readings 1/22/2019 1/15/2019 1/10/2019 12/23/2018 12/18/2018    SBP (mmHg) 147 126 161 146 140    DBP (mmHg) 86 59 72 67 68    Pulse 68 75 81 75 75        Digital Medicine: Health  Follow Up     Left voicemail to follow up with Mr. Janusz Montgomery Jr..  Current BP average 145/71 mmHg is not at goal, <130/80 mmHg.  Explained health  change. Will follow up in 2 weeks.

## 2019-01-25 NOTE — PROGRESS NOTES
Patient, Janusz Montgomery Jr. (MRN #[id number]), presented with a recorded BMI of 47.76 kg/m^2 consistent with the definition of morbid obesity (ICD-10 E66.01). The patient's morbid obesity was monitored, evaluated, addressed and/or treated. This addendum to the medical record is made on 01/25/2019.

## 2019-01-29 ENCOUNTER — CLINICAL SUPPORT (OUTPATIENT)
Dept: REHABILITATION | Facility: HOSPITAL | Age: 74
End: 2019-01-29
Payer: MEDICARE

## 2019-01-29 DIAGNOSIS — M67.01 HEEL CORD TIGHTNESS, RIGHT: ICD-10-CM

## 2019-01-29 DIAGNOSIS — Z74.09 IMPAIRED MOBILITY: ICD-10-CM

## 2019-01-29 DIAGNOSIS — R26.9 GAIT ABNORMALITY: Primary | ICD-10-CM

## 2019-01-29 DIAGNOSIS — R29.898 RIGHT LEG WEAKNESS: ICD-10-CM

## 2019-01-29 DIAGNOSIS — R26.89 BALANCE PROBLEMS: ICD-10-CM

## 2019-01-29 DIAGNOSIS — M25.671 DECREASED RANGE OF MOTION OF RIGHT ANKLE: ICD-10-CM

## 2019-01-29 PROCEDURE — 97140 MANUAL THERAPY 1/> REGIONS: CPT | Mod: HCNC,PN | Performed by: PHYSICAL THERAPIST

## 2019-01-29 PROCEDURE — 97110 THERAPEUTIC EXERCISES: CPT | Mod: HCNC,PN | Performed by: PHYSICAL THERAPIST

## 2019-01-29 NOTE — PROGRESS NOTES
"  Physical Therapy Daily Treatment Note     Name: Janusz Montgomery Jr.  Clinic Number: [id number]    Therapy Diagnosis:   Encounter Diagnoses   Name Primary?    Gait abnormality Yes    Heel cord tightness, right     Decreased range of motion of right ankle     Impaired mobility     Right leg weakness     Balance problems      Physician: Silvia Leroy PA-C    Visit Date: 1/29/2019    Physician Orders: evaluate and treat  Medical Diagnosis: Type III open fracture of left tibia and fibula with routine healing: S/P orthopedic surgery, ORIF left tibia  follow-up exam  Evaluation Date: 1/18/2019  Authorization Period Expiration: 12/31/2019  Plan of Care Certification Period: 4/18/2019  Visit #/Visits authorized: 2/ 20     Time In: 1505  Time Out: 1610  Total Billable Time: 65 minutes    Precautions: Standard    Subjective     Pt reports: there is no pain at the moment. Says there is no pain walking but there is pain to touch in the left side of the foot.    .He is not  compliant with home exercise program that he was previously provided with during last therapy encounter.  Response to previous treatment: NA.   Functional change: NA     Pain: 0/10  Location: left ankle    Objective     Janusz received therapeutic exercises to develop strength, ROM, flexibility and balance / stability for 60 minutes including:    Leg press   Recumbent bike  Upright bike   UE ergometer  Treadmill   Elliptical       THERAPEUTIC EXERCISE  SUPINE    SIDELYING    PRONE    STANDING.  -heel cord stretches  30" x 4   -soleus stretches 30" x 4     SITTING  -anterior tib stretch 30" x 4   -heel lifts x 20  -foot raise x 20   -arch lift x 20   -eversion x 20   -inversion x 20        MANUAL THERAPY:MODALITY  DIRECT EDUCATION:      Patient was instructed to resume previous HEP provided in 12/2018  Written Home Exercises Reviewed.   Pt demo good understanding of the education provided. Patient demonstrated good return demonstration of activities     " Janusz received the following manual therapy techniques:  IASTM over the anterior leg and foot for 5' minutes,   Janusz participated in neuromuscular re-education activities to improve:NA    Assessment     The patient completed all activities as noted. He maintains limitation of mobility due to joint mobility and tissue extensibility dysfunction. Significant heel cord and anterior leg muscle tightness along with decreased ankle muscle weakness that effects balance and stability.   Janusz is progressing well towards his goals.   Pt prognosis is Fair.   Pt will continue to benefit from skilled outpatient physical therapy to address the deficits listed in the problem list box on initial evaluation, provide pt/family education and to maximize pt's level of independence in the home and community environment.   Pt's spiritual, cultural and educational needs considered and pt agreeable to plan of care and goals.     Anticipated barriers to physical therapy: compliance with HEP which he has not demonstrated to this point.     Goals:   Short Term Goals (4 Weeks):      1.Pt to increase strength by a 1/2 grade of muscles test to allow for improvement in functional activities such as performing chores.  2.Pt to improve passive range of motion by 5 to 10 degrees or greater to allow for improved joint and functional mobility   3.Pt to report compliance with HEP and demonstrate proper exercise technique to PT to show competence with self management of condition.  4.Eliminate forefoot  pain by during walking  5. Demonstrate improved neuromuscular response to unilateral stand     Long Term Goals (8 Weeks):      1. Increase AROM by5 to 10 degrees or greater in dorsiflexion / plantarflexion / inversion / eversion to allow improved joint biomechanics during gait   2.Increase leg muscle strength one grade  3. Independent with home exercise program.   4. Return to functional activities with manageable complaints.  5. Patient to demonstrate  improved gait pattern with equal step length, improved heel strike and toe off.      Plan     Certification Period: 1/18/2019 to 4/18/2019  Recommended Treatment Plan: 2 times per week for 12 weeks: Manual Therapy, Neuromuscular Re-ed, Patient Education and Therapeutic Exercise  Other Recommendations: modalities PRN  Pt may be seen by PTA as part of the rehabilitation team.        Yusuf Farooq, PT

## 2019-01-31 ENCOUNTER — CLINICAL SUPPORT (OUTPATIENT)
Dept: REHABILITATION | Facility: HOSPITAL | Age: 74
End: 2019-01-31
Payer: MEDICARE

## 2019-01-31 DIAGNOSIS — R26.9 GAIT ABNORMALITY: Primary | ICD-10-CM

## 2019-01-31 DIAGNOSIS — R26.89 BALANCE PROBLEMS: ICD-10-CM

## 2019-01-31 DIAGNOSIS — R29.898 RIGHT LEG WEAKNESS: ICD-10-CM

## 2019-01-31 DIAGNOSIS — Z74.09 IMPAIRED MOBILITY: ICD-10-CM

## 2019-01-31 PROCEDURE — 97140 MANUAL THERAPY 1/> REGIONS: CPT | Mod: HCNC,PN | Performed by: PHYSICAL THERAPIST

## 2019-01-31 PROCEDURE — 97110 THERAPEUTIC EXERCISES: CPT | Mod: HCNC,PN | Performed by: PHYSICAL THERAPIST

## 2019-01-31 NOTE — PROGRESS NOTES
"  Physical Therapy Daily Treatment Note     Name: Janusz Montgomery Jr.  Clinic Number: [id number]    Therapy Diagnosis:   Encounter Diagnoses   Name Primary?    Gait abnormality Yes    Right leg weakness     Impaired mobility     Balance problems      Physician: Silvia Leroy PA-C    Visit Date: 1/31/2019    Physician Orders: evaluate and treat  Medical Diagnosis: Type III open fracture of left tibia and fibula with routine healing: S/P orthopedic surgery, ORIF left tibia  follow-up exam  Evaluation Date: 1/18/2019  Authorization Period Expiration: 12/31/2019  Plan of Care Certification Period: 4/18/2019  Visit #/Visits authorized: 2/ 20     Time In: 1510  Time Out: 1625  Total Billable Time:  75 minutes    Precautions: Standard    Subjective     Pt reports: there is no pain in the leg or foot. It still remains tight and just does not move. Says he responded well to the last session.   He says he is attempting to be more compliant with home exercise program that he was previously provided with during last therapy encounter.  Response to previous treatment: NA.   Functional change: NA     Pain: 0/10  Location: left ankle    Objective     Janusz received therapeutic exercises to develop strength, ROM, flexibility and balance / stability for 60 minutes including:    Leg press   Recumbent bike  Upright bike 8'   UE ergometer  Treadmill   Elliptical       THERAPEUTIC EXERCISE  SUPINE    SIDELYING    PRONE    STANDING.  -heel cord stretches  30" x 4   -soleus stretches 30" x 4     SITTING  -anterior tib stretch 30" x 4 on oval foam   -heel lifts 2x30  On oval foam   -foot raise 2x30 on oval foam   -arch lift x 20   -eversion x 20   -inversion x 20        MANUAL THERAPY:IASTM over the anterior, lateral and posterior aspects of the leg using up and down regulation strokes. RT applied as a decompressive measure of the areas treated. 1-1 PT = 12'   MODALITY  DIRECT EDUCATION:      Patient was instructed to resume previous HEP " provided in 12/2018  Written Home Exercises Reviewed.   Pt demo good understanding of the education provided. Patient demonstrated good return demonstration of activities     Janusz received the following manual therapy techniques:  IASTM over the anterior leg and foot for 5' minutes,   Janusz participated in neuromuscular re-education activities to improve:NA    Assessment     The patient performed all activities as noted. He encounters difficulty with inversion and eversion movements. There is tenderness and discomfort in the medial aspect of the proximal leg associated with IASTM.  There may be some pes anserine involvement. Continue to work towards restoring mobility.   Janusz is progressing well towards his goals.   Pt prognosis is Fair.   Pt will continue to benefit from skilled outpatient physical therapy to address the deficits listed in the problem list box on initial evaluation, provide pt/family education and to maximize pt's level of independence in the home and community environment.   Pt's spiritual, cultural and educational needs considered and pt agreeable to plan of care and goals.     Anticipated barriers to physical therapy: compliance with HEP which he has not demonstrated to this point.     Goals:   Short Term Goals (4 Weeks):      1.Pt to increase strength by a 1/2 grade of muscles test to allow for improvement in functional activities such as performing chores.  2.Pt to improve passive range of motion by 5 to 10 degrees or greater to allow for improved joint and functional mobility   3.Pt to report compliance with HEP and demonstrate proper exercise technique to PT to show competence with self management of condition.  4.Eliminate forefoot  pain by during walking  5. Demonstrate improved neuromuscular response to unilateral stand     Long Term Goals (8 Weeks):      1. Increase AROM by5 to 10 degrees or greater in dorsiflexion / plantarflexion / inversion / eversion to allow improved joint  biomechanics during gait   2.Increase leg muscle strength one grade  3. Independent with home exercise program.   4. Return to functional activities with manageable complaints.  5. Patient to demonstrate improved gait pattern with equal step length, improved heel strike and toe off.      Plan     Certification Period: 1/18/2019 to 4/18/2019  Recommended Treatment Plan: 2 times per week for 12 weeks: Manual Therapy, Neuromuscular Re-ed, Patient Education and Therapeutic Exercise  Other Recommendations: modalities PRN  Pt may be seen by PTA as part of the rehabilitation team.        Yusuf Farooq, PT

## 2019-02-04 DIAGNOSIS — Z46.9 FITTING AND ADJUSTMENT OF DEVICE: Primary | ICD-10-CM
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
## 2019-02-06 NOTE — PROGRESS NOTES
Last 5 Patient Entered Readings                                      Current 30 Day Average: 146/71     Recent Readings 2/5/2019 2/5/2019 1/29/2019 1/25/2019 1/22/2019    SBP (mmHg) 147 144 142 155 147    DBP (mmHg) 75 61 67 74 86    Pulse 86 84 75 90 68          Health  spoke with patient on 2/5. He is working on physical activity and reports coming down with the cold. Follow-up on change to olmesartan.

## 2019-02-12 ENCOUNTER — CLINICAL SUPPORT (OUTPATIENT)
Dept: REHABILITATION | Facility: HOSPITAL | Age: 74
End: 2019-02-12
Payer: MEDICARE

## 2019-02-12 DIAGNOSIS — R26.89 BALANCE PROBLEMS: Primary | ICD-10-CM

## 2019-02-12 DIAGNOSIS — Z74.09 IMPAIRED MOBILITY: ICD-10-CM

## 2019-02-12 DIAGNOSIS — R26.9 GAIT ABNORMALITY: ICD-10-CM

## 2019-02-12 PROCEDURE — 97110 THERAPEUTIC EXERCISES: CPT | Mod: HCNC,PN | Performed by: PHYSICAL THERAPIST
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
## 2019-02-13 ENCOUNTER — PATIENT OUTREACH (OUTPATIENT)
Dept: OTHER | Facility: OTHER | Age: 74
End: 2019-02-13

## 2019-02-13 NOTE — PROGRESS NOTES
Last 5 Patient Entered Readings                                      Current 30 Day Average: 141/70     Recent Readings 2/8/2019 2/8/2019 2/5/2019 2/5/2019 1/29/2019    SBP (mmHg) 126 141 147 144 142    DBP (mmHg) 67 73 75 61 67    Pulse 87 94 86 84 75          Left voicemail.  Follow up on change from ramipril 5mg to olmesartan 20 mg.

## 2019-02-18 ENCOUNTER — PES CALL (OUTPATIENT)
Dept: ADMINISTRATIVE | Facility: CLINIC | Age: 74
End: 2019-02-18

## 2019-02-18 DIAGNOSIS — F33.0 MAJOR DEPRESSIVE DISORDER, RECURRENT EPISODE, MILD: ICD-10-CM

## 2019-02-18 DIAGNOSIS — Z12.11 COLON CANCER SCREENING: Primary | ICD-10-CM

## 2019-02-18 RX ORDER — VENLAFAXINE 75 MG/1
150 TABLET ORAL 2 TIMES DAILY
Qty: 360 TABLET | Refills: 0 | Status: SHIPPED | OUTPATIENT
Start: 2019-02-18 | End: 2019-05-29 | Stop reason: SDUPTHER

## 2019-02-19 ENCOUNTER — PATIENT MESSAGE (OUTPATIENT)
Dept: ELECTROPHYSIOLOGY | Facility: CLINIC | Age: 74
End: 2019-02-19

## 2019-02-19 DIAGNOSIS — I48.0 PAROXYSMAL ATRIAL FIBRILLATION: Primary | ICD-10-CM

## 2019-02-20 ENCOUNTER — TELEPHONE (OUTPATIENT)
Dept: ENDOSCOPY | Facility: HOSPITAL | Age: 74
End: 2019-02-20

## 2019-02-21 ENCOUNTER — CLINICAL SUPPORT (OUTPATIENT)
Dept: REHABILITATION | Facility: HOSPITAL | Age: 74
End: 2019-02-21
Payer: MEDICARE

## 2019-02-21 ENCOUNTER — TELEPHONE (OUTPATIENT)
Dept: ENDOSCOPY | Facility: HOSPITAL | Age: 74
End: 2019-02-21

## 2019-02-21 DIAGNOSIS — R26.89 BALANCE PROBLEMS: Primary | ICD-10-CM

## 2019-02-21 DIAGNOSIS — R26.9 GAIT ABNORMALITY: ICD-10-CM

## 2019-02-21 DIAGNOSIS — M25.672 DECREASED RANGE OF MOTION OF LEFT ANKLE: ICD-10-CM

## 2019-02-21 DIAGNOSIS — Z74.09 IMPAIRED MOBILITY: ICD-10-CM

## 2019-02-21 DIAGNOSIS — R29.898 LEFT LEG WEAKNESS: ICD-10-CM

## 2019-02-21 PROCEDURE — 97110 THERAPEUTIC EXERCISES: CPT | Mod: HCNC,PN | Performed by: PHYSICAL THERAPIST

## 2019-02-21 NOTE — PROGRESS NOTES
"  Physical Therapy Daily Treatment Note     Name: Janusz Montgomery Jr.  Clinic Number: [id number]    Therapy Diagnosis:   Encounter Diagnoses   Name Primary?    Balance problems Yes    Gait abnormality     Impaired mobility     Decreased range of motion of left ankle     Left leg weakness      Physician: Silvia Leroy PA-C    Visit Date: 2/21/2019    Physician Orders: evaluate and treat  Medical Diagnosis: Type III open fracture of left tibia and fibula with routine healing: S/P orthopedic surgery, ORIF left tibia  follow-up exam  Evaluation Date: 1/18/2019  Authorization Period Expiration: 12/31/2019  Plan of Care Certification Period: 4/18/2019  Visit #/Visits authorized: 5/ 20     Time In: 1510  Time Out: 1615  Total Billable Time:  65 minutes    Precautions: Standard    Subjective     Pt reports:the leg and foot are feeling fine and no pain reported. Just tightness. Says the foot hurt after removing the tape but later felt better.    He says he is doingt the exercises at home and takes a hot bath before therapy to loosen the muscles.     Response to previous treatment: Good   Functional change: unchanged    Pain: 0/10  Location: left ankle    Objective     Janusz received therapeutic exercises to develop strength, ROM, flexibility and balance / stability for 30 minutes including:    Leg press   Recumbent bike  Upright bike 8'   UE ergometer  Treadmill   Elliptical   Shuttle 9'      THERAPEUTIC EXERCISE  SUPINE    SIDELYING    PRONE    STANDING.  -heel cord stretches  30" x 4   -soleus stretches 30" x 4   -balance  =SL stable  15" x 4   =double leg airex narrow base eyes open 15" x 4   =double leg wide base airex eyes closed 15" x 4   =stagger stand ( medial heel -medial heel ) 15" x 4   -heel lifts 2x15    -foot raises 2 x 15   -arch lift x 20   -divers stand 20" x 3   -squats at table x 20   -rock fwd active DF 1' x 2 ea.     SITTING  -anterior tib stretch 30" x 4 on oval foam   -heel lifts 2x30  On oval foam "   -foot raise 2x30 on oval foam   -arch lift x 20   -eversion x 20   -inversion x 20   -ankle patterns inv / DF= ever / PF; ever / DF = inv / PF      MANUAL THERAPY: Manual mobs of the sub-talar and mid tarsal joints.   MODALITY  DIRECT EDUCATION:      Patient was instructed to resume previous HEP provided in 12/2018  Written Home Exercises Reviewed.   Pt demo good understanding of the education provided. Patient demonstrated good return demonstration of activities  Encouraged manual stretching for the evertors and dorsiflexors.   Janusz received the following manual therapy techniques:  IASTM over the anterior leg and foot for NP  Janusz participated in neuromuscular re-education activities to improve balance and stability    Assessment       FOTO next visit    Activities completed. The patient is at an impasse as far as progression is concerned. He is performing activities that he previously has performed with the attempt to regain what he lost after the previous therapy. He is challenged with balance activities and does not understand why he has so much difficulty. Began patterned ankle exercises to facilitate muscular control. DN may be an option to reduce tissue restriction.      Janusz is progressing  towards his goals.   Pt prognosis is Fair.   Pt will continue to benefit from skilled outpatient physical therapy to address the deficits listed in the problem list box on initial evaluation, provide pt/family education and to maximize pt's level of independence in the home and community environment.   Pt's spiritual, cultural and educational needs considered and pt agreeable to plan of care and goals.     Anticipated barriers to physical therapy: compliance with HEP which he has not demonstrated to this point.     Goals:   Short Term Goals (4 Weeks):      1.Pt to increase strength by a 1/2 grade of muscles test to allow for improvement in functional activities such as performing chores.  2.Pt to improve passive  range of motion by 5 to 10 degrees or greater to allow for improved joint and functional mobility   3.Pt to report compliance with HEP and demonstrate proper exercise technique to PT to show competence with self management of condition.  4.Eliminate forefoot  pain by during walking  5. Demonstrate improved neuromuscular response to unilateral stand     Long Term Goals (8 Weeks):      1. Increase AROM by5 to 10 degrees or greater in dorsiflexion / plantarflexion / inversion / eversion to allow improved joint biomechanics during gait   2.Increase leg muscle strength one grade  3. Independent with home exercise program.   4. Return to functional activities with manageable complaints.  5. Patient to demonstrate improved gait pattern with equal step length, improved heel strike and toe off.      Plan     Certification Period: 1/18/2019 to 4/18/2019  Recommended Treatment Plan: 2 times per week for 12 weeks: Manual Therapy, Neuromuscular Re-ed, Patient Education and Therapeutic Exercise  Other Recommendations: modalities PRN  Pt may be seen by PTA as part of the rehabilitation team.        Yusuf Farooq, PT

## 2019-02-21 NOTE — TELEPHONE ENCOUNTER
Luis Palacio MD   to Me           2/20/19 3:26 PM    Yes that is fine. Thanks              2/20/19 2:13 PM   You routed this conversation to Luis Palacio MD    Me          2/20/19 2:12 PM   Note      Pt needs to be scheduled for a colonoscopy.  Pt is currently taking Eliquis.  Per Endoscopy protocol it should be held x 2 days prior.  Ok to hold?  Please advise.  Thanks

## 2019-02-26 ENCOUNTER — CLINICAL SUPPORT (OUTPATIENT)
Dept: REHABILITATION | Facility: HOSPITAL | Age: 74
End: 2019-02-26
Payer: MEDICARE

## 2019-02-26 DIAGNOSIS — R26.9 GAIT ABNORMALITY: ICD-10-CM

## 2019-02-26 DIAGNOSIS — R29.898 LEFT LEG WEAKNESS: ICD-10-CM

## 2019-02-26 DIAGNOSIS — M25.60 RESTRICTION OF JOINT MOTION: ICD-10-CM

## 2019-02-26 DIAGNOSIS — R26.89 BALANCE PROBLEMS: Primary | ICD-10-CM

## 2019-02-26 DIAGNOSIS — M25.672 DECREASED RANGE OF MOTION OF LEFT ANKLE: ICD-10-CM

## 2019-02-26 DIAGNOSIS — R29.898 RIGHT LEG WEAKNESS: ICD-10-CM

## 2019-02-26 DIAGNOSIS — Z74.09 IMPAIRED MOBILITY: ICD-10-CM

## 2019-02-26 PROCEDURE — 97110 THERAPEUTIC EXERCISES: CPT | Mod: HCNC,PN | Performed by: PHYSICAL THERAPIST

## 2019-02-26 NOTE — PROGRESS NOTES
"  Physical Therapy Daily Treatment Note     Name: Janusz Montgomery Jr.  Clinic Number: [id number]    Therapy Diagnosis:   Encounter Diagnoses   Name Primary?    Balance problems Yes    Decreased range of motion of left ankle     Gait abnormality     Impaired mobility     Left leg weakness     Restriction of joint motion     Right leg weakness      Physician: Silvia Leroy PA-C    Visit Date: 2/26/2019    Physician Orders: evaluate and treat  Medical Diagnosis: Type III open fracture of left tibia and fibula with routine healing: S/P orthopedic surgery, ORIF left tibia  follow-up exam  Evaluation Date: 1/18/2019  Authorization Period Expiration: 12/31/2019  Plan of Care Certification Period: 4/18/2019  Visit #/Visits authorized: 6 / 20     Time In: 1505  Time Out: 1615  Total Billable Time:70  minutes    Precautions: Standard    Subjective     Pt reports: his legs were very sore after the last session along the inside of the thighs. .    He says he was too sore to do any exercises at home.   Response to previous treatment: Good other than having t he soreness in the legs   Functional change: unchanged    Pain: 0/10  Location: left ankle    Objective     Janusz received therapeutic exercises to develop strength, ROM, flexibility and balance / stability for  minutes including:    Leg press   Recumbent bike  Upright bike 8'   UE ergometer  Treadmill   Elliptical   Shuttle 9'      THERAPEUTIC EXERCISE  SUPINE    SIDELYING    PRONE    STANDING.  -heel cord stretches  30" x 4   -soleus stretches 30" x 4   -balance  =SL stable  15" x 4   =double leg airex narrow base eyes open 15" x 4   =double leg wide base airex eyes closed 15" x 4   =stagger stand ( medial heel -medial heel ) 15" x 4   -heel lifts 2x15    -foot raises 2 x 15   -arch lift x 20   -divers stand 20" x 3   -squats at table x 20   -rock fwd active DF 1' x 2 ea. On step   -  SITTING  -anterior tib stretch 30" x 4 on oval foam   -heel lifts 2x30  On 1/2 roll "   -foot raise 2x30 on 1/2 roll   -arch lift x 20   -eversion x 20   -inversion x 20   -ankle patterns inv / DF= ever / PF; ever / DF = inv / PF      MANUAL THERAPY: Manual mobs of the sub-talar and mid tarsal joints.   MODALITY  DIRECT EDUCATION:      Patient was instructed to resume previous HEP provided in 12/2018  Written Home Exercises Reviewed.    Patient was issued HEP 2/26/2019  Printed Home Exercises Reviewed.   Pt demo good understanding of the education provided. Patient demonstrated good return demonstration of activities       See EMR under Patient Instructions for exercises provided 2/26/2019.    Pt demo good understanding of the education provided. Patient demonstrated good return demonstration of activities  Encouraged manual stretching for the evertors and dorsiflexors.   Janusz received the following manual therapy techniques:  IASTM over the anterior leg and foot for NP  Janusz participated in neuromuscular re-education activities to improve balance and stability    Assessment       FOTO next visit  Patient completed the routine. He is not able to actively pull the left foot into dorsiflexion when standing. There is mobility limitations possibly associated with intra-articular limitations along with tissue extensibility dysfunction. He is challenged with sustaining an activity for a period of time and this may be tied into his PVD. There has  Not been any significant change in his overall joint ROM.   Janusz is progressing  towards his goals.   Pt prognosis is Fair.   Pt will continue to benefit from skilled outpatient physical therapy to address the deficits listed in the problem list box on initial evaluation, provide pt/family education and to maximize pt's level of independence in the home and community environment.   Pt's spiritual, cultural and educational needs considered and pt agreeable to plan of care and goals.     Anticipated barriers to physical therapy: compliance with HEP which he has  not demonstrated to this point.     Goals:   Short Term Goals (4 Weeks):      1.Pt to increase strength by a 1/2 grade of muscles test to allow for improvement in functional activities such as performing chores.  2.Pt to improve passive range of motion by 5 to 10 degrees or greater to allow for improved joint and functional mobility   3.Pt to report compliance with HEP and demonstrate proper exercise technique to PT to show competence with self management of condition.  4.Eliminate forefoot  pain by during walking  5. Demonstrate improved neuromuscular response to unilateral stand     Long Term Goals (8 Weeks):      1. Increase AROM by5 to 10 degrees or greater in dorsiflexion / plantarflexion / inversion / eversion to allow improved joint biomechanics during gait   2.Increase leg muscle strength one grade  3. Independent with home exercise program.   4. Return to functional activities with manageable complaints.  5. Patient to demonstrate improved gait pattern with equal step length, improved heel strike and toe off.      Plan     Certification Period: 1/18/2019 to 4/18/2019  Recommended Treatment Plan: 2 times per week for 12 weeks: Manual Therapy, Neuromuscular Re-ed, Patient Education and Therapeutic Exercise  Other Recommendations: modalities PRN  Pt may be seen by PTA as part of the rehabilitation team.        Yusuf Farooq, PT

## 2019-02-27 ENCOUNTER — TELEPHONE (OUTPATIENT)
Dept: FAMILY MEDICINE | Facility: CLINIC | Age: 74
End: 2019-02-27

## 2019-02-27 NOTE — PROGRESS NOTES
Last 5 Patient Entered Readings                                      Current 30 Day Average: 141/67     Recent Readings 2/25/2019 2/19/2019 2/8/2019 2/8/2019 2/5/2019    SBP (mmHg) 142 146 126 141 147    DBP (mmHg) 69 56 67 73 75    Pulse 78 77 87 94 86          Left voicemail.  Follow up on change from ramipril 5mg to olmesartan 20 mg.

## 2019-03-06 ENCOUNTER — CLINICAL SUPPORT (OUTPATIENT)
Dept: CARDIOLOGY | Facility: HOSPITAL | Age: 74
End: 2019-03-06
Attending: INTERNAL MEDICINE
Payer: MEDICARE

## 2019-03-06 DIAGNOSIS — Z46.9 FITTING AND ADJUSTMENT OF DEVICE: ICD-10-CM

## 2019-03-06 PROCEDURE — 93299 CARDIAC DEVICE CHECK - REMOTE: CPT | Mod: HCNC

## 2019-03-11 ENCOUNTER — PATIENT OUTREACH (OUTPATIENT)
Dept: OTHER | Facility: OTHER | Age: 74
End: 2019-03-11

## 2019-03-11 NOTE — PROGRESS NOTES
Last 5 Patient Entered Readings                                      Current 30 Day Average: 146/66     Recent Readings 3/3/2019 3/3/2019 2/25/2019 2/19/2019 2/8/2019    SBP (mmHg) 149 127 142 146 126    DBP (mmHg) 71 69 69 56 67    Pulse 72 73 78 77 87        Digital Medicine: Health  Follow Up    Left voicemail to follow up with Mr. Janusz Montgomery Jr..  Current BP average 146/66 mmHg is not at goal, <130/80 mmHg.  Will follow up in 2 weeks.

## 2019-03-12 ENCOUNTER — CLINICAL SUPPORT (OUTPATIENT)
Dept: REHABILITATION | Facility: HOSPITAL | Age: 74
End: 2019-03-12
Payer: MEDICARE

## 2019-03-12 DIAGNOSIS — R29.898 RIGHT LEG WEAKNESS: ICD-10-CM

## 2019-03-12 DIAGNOSIS — Z09 S/P ORTHOPEDIC SURGERY, FOLLOW-UP EXAM: ICD-10-CM

## 2019-03-12 DIAGNOSIS — Z74.09 IMPAIRED MOBILITY: ICD-10-CM

## 2019-03-12 DIAGNOSIS — M25.672 DECREASED RANGE OF MOTION OF LEFT ANKLE: ICD-10-CM

## 2019-03-12 DIAGNOSIS — M25.60 RESTRICTION OF JOINT MOTION: ICD-10-CM

## 2019-03-12 DIAGNOSIS — R26.89 BALANCE PROBLEMS: Primary | ICD-10-CM

## 2019-03-12 DIAGNOSIS — R26.9 GAIT ABNORMALITY: ICD-10-CM

## 2019-03-12 DIAGNOSIS — R29.898 LEFT LEG WEAKNESS: ICD-10-CM

## 2019-03-12 PROCEDURE — 97110 THERAPEUTIC EXERCISES: CPT | Mod: HCNC,PN | Performed by: PHYSICAL THERAPIST

## 2019-03-12 NOTE — PROGRESS NOTES
"  Physical Therapy Daily Treatment Note     Name: Janusz Montgomery Jr.  Clinic Number: [id number]    Therapy Diagnosis:   Encounter Diagnoses   Name Primary?    Balance problems Yes    Decreased range of motion of left ankle     Gait abnormality     Impaired mobility     Left leg weakness     Restriction of joint motion     Right leg weakness     S/P orthopedic surgery, follow-up exam      Physician: Silvia Leroy PA-C    Visit Date: 3/12/2019    Physician Orders: evaluate and treat  Medical Diagnosis: Type III open fracture of left tibia and fibula with routine healing: S/P orthopedic surgery, ORIF left tibia  follow-up exam  Evaluation Date: 1/18/2019  Authorization Period Expiration: 12/31/2019  Plan of Care Certification Period: 4/18/2019  Visit #/Visits authorized: 7 / 20     Time In: 1615  Time Out: 1730  Total Billable Time:  75  minutes    Precautions: Standard    Subjective     Pt reports: the leg is feeling about the same. He says that he thinks bone is restricting mobility in the foot. Says the MD put some bone in the foot to help stimulate new growth.      Response to previous treatment: No difficulty. Trying to do things at home.   Functional change: unchanged    Pain: 0/10  Location: left ankle    Objective     Janusz received therapeutic exercises to develop strength, ROM, flexibility and balance / stability for  minutes including:    Leg press   Recumbent bike  Upright bike 8'   UE ergometer  Treadmill   Elliptical   Shuttle 9'      THERAPEUTIC EXERCISE  SUPINE    SIDELYING    PRONE    STANDING.  -heel cord stretches  30" x 4   -soleus stretches 30" x 4   -balance  =SL stable  15" x 4    =double leg airex narrow base eyes open 15" x 4   =double leg wide base airex eyes closed 15" x 4   =stagger stand ( medial heel -medial heel ) 15" x 4   -heel lifts 2x15    -foot raises 2 x 15   -arch lift x 40   -divers stand 20" x 3   -squats at table x 20   -rock fwd active DF 1' x 2 ea. On step   -Red disc " "    SITTING  -anterior tib stretch 30" x 4 on oval foam   -heel lifts 2x30  On 1/2 roll   -foot raise 2x30 on 1/2 roll   -arch lift x 20   -eversion x 20   -inversion x 20   -ankle patterns inv / DF= ever / PF; ever / DF = inv / PF    BAPS BOARD unloaded standing position off edge of table. CW/CC, DF/PF, INVER / EVER     MANUAL THERAPY: Passive plantarflexion to stretch the dorsum of the foot and ankle as well as passive DF and toe flexor stretch. Passive inversion also performed. Patient was also instructed in such activity.   MODALITY  DIRECT EDUCATION:   Discussed importance of follow up an constant movement of the ankle. Reviewed gait pattern with return demonstration emphasis on heel strike and DF along woith toe off    Patient was instructed to resume previous HEP provided in 12/2018  Written Home Exercises Reviewed.    Patient was issued HEP 2/26/2019  Printed Home Exercises Reviewed.   Pt demo good understanding of the education provided. Patient demonstrated good return demonstration of activities       See EMR under Patient Instructions for exercises provided 2/26/2019.    Pt demo good understanding of the education provided. Patient demonstrated good return demonstration of activities  Encouraged manual stretching for the evertors and dorsiflexors.   Janusz received the following manual therapy techniques:  IASTM over the anterior leg and foot for NP  Janusz participated in neuromuscular re-education activities to improve balance and stability    Assessment       FOTO next visit  Patient completed the routine. He is not able to control inversion in  The loaded position evident on the BAPS board, however, unloaded he is able to initiate the movement and control the board. This is indicative of a stability and motor control dysfunction.   Janusz is progressing  towards his goals.   Pt prognosis is Fair.   Pt will continue to benefit from skilled outpatient physical therapy to address the deficits listed in the " problem list box on initial evaluation, provide pt/family education and to maximize pt's level of independence in the home and community environment.   Pt's spiritual, cultural and educational needs considered and pt agreeable to plan of care and goals.     Anticipated barriers to physical therapy: compliance with HEP which he has not demonstrated to this point.     Goals:   Short Term Goals (4 Weeks):      1.Pt to increase strength by a 1/2 grade of muscles test to allow for improvement in functional activities such as performing chores.  2.Pt to improve passive range of motion by 5 to 10 degrees or greater to allow for improved joint and functional mobility   3.Pt to report compliance with HEP and demonstrate proper exercise technique to PT to show competence with self management of condition.  4.Eliminate forefoot  pain by during walking  5. Demonstrate improved neuromuscular response to unilateral stand     Long Term Goals (8 Weeks):      1. Increase AROM by5 to 10 degrees or greater in dorsiflexion / plantarflexion / inversion / eversion to allow improved joint biomechanics during gait   2.Increase leg muscle strength one grade  3. Independent with home exercise program.   4. Return to functional activities with manageable complaints.  5. Patient to demonstrate improved gait pattern with equal step length, improved heel strike and toe off.      Plan     Certification Period: 1/18/2019 to 4/18/2019  Recommended Treatment Plan: 2 times per week for 12 weeks: Manual Therapy, Neuromuscular Re-ed, Patient Education and Therapeutic Exercise  Other Recommendations: modalities PRN  Pt may be seen by PTA as part of the rehabilitation team.        Yusuf Farooq, PT

## 2019-03-13 ENCOUNTER — OFFICE VISIT (OUTPATIENT)
Dept: PULMONOLOGY | Facility: CLINIC | Age: 74
End: 2019-03-13
Payer: MEDICARE

## 2019-03-13 VITALS
SYSTOLIC BLOOD PRESSURE: 112 MMHG | TEMPERATURE: 99 F | DIASTOLIC BLOOD PRESSURE: 72 MMHG | HEIGHT: 73 IN | OXYGEN SATURATION: 93 % | HEART RATE: 89 BPM | BODY MASS INDEX: 41.75 KG/M2 | WEIGHT: 315 LBS

## 2019-03-13 DIAGNOSIS — G47.33 OBSTRUCTIVE SLEEP APNEA ON CPAP: ICD-10-CM

## 2019-03-13 DIAGNOSIS — E66.01 MORBID OBESITY WITH BMI OF 40.0-44.9, ADULT: ICD-10-CM

## 2019-03-13 DIAGNOSIS — G47.21 DELAYED SLEEP PHASE SYNDROME: ICD-10-CM

## 2019-03-13 DIAGNOSIS — G47.33 OSA (OBSTRUCTIVE SLEEP APNEA): Primary | ICD-10-CM

## 2019-03-13 PROCEDURE — 3074F SYST BP LT 130 MM HG: CPT | Mod: HCNC,CPTII,S$GLB, | Performed by: INTERNAL MEDICINE

## 2019-03-13 PROCEDURE — 99999 PR PBB SHADOW E&M-EST. PATIENT-LVL V: ICD-10-PCS | Mod: PBBFAC,HCNC,, | Performed by: INTERNAL MEDICINE

## 2019-03-13 PROCEDURE — 99499 RISK ADDL DX/OHS AUDIT: ICD-10-PCS | Mod: HCNC,S$GLB,, | Performed by: INTERNAL MEDICINE

## 2019-03-13 PROCEDURE — 3078F PR MOST RECENT DIASTOLIC BLOOD PRESSURE < 80 MM HG: ICD-10-PCS | Mod: HCNC,CPTII,S$GLB, | Performed by: INTERNAL MEDICINE

## 2019-03-13 PROCEDURE — 1101F PR PT FALLS ASSESS DOC 0-1 FALLS W/OUT INJ PAST YR: ICD-10-PCS | Mod: HCNC,CPTII,S$GLB, | Performed by: INTERNAL MEDICINE

## 2019-03-13 PROCEDURE — 3078F DIAST BP <80 MM HG: CPT | Mod: HCNC,CPTII,S$GLB, | Performed by: INTERNAL MEDICINE

## 2019-03-13 PROCEDURE — 99214 OFFICE O/P EST MOD 30 MIN: CPT | Mod: HCNC,S$GLB,, | Performed by: INTERNAL MEDICINE

## 2019-03-13 PROCEDURE — 3074F PR MOST RECENT SYSTOLIC BLOOD PRESSURE < 130 MM HG: ICD-10-PCS | Mod: HCNC,CPTII,S$GLB, | Performed by: INTERNAL MEDICINE

## 2019-03-13 PROCEDURE — 1101F PT FALLS ASSESS-DOCD LE1/YR: CPT | Mod: HCNC,CPTII,S$GLB, | Performed by: INTERNAL MEDICINE

## 2019-03-13 PROCEDURE — 99999 PR PBB SHADOW E&M-EST. PATIENT-LVL V: CPT | Mod: PBBFAC,HCNC,, | Performed by: INTERNAL MEDICINE

## 2019-03-13 PROCEDURE — 99214 PR OFFICE/OUTPT VISIT, EST, LEVL IV, 30-39 MIN: ICD-10-PCS | Mod: HCNC,S$GLB,, | Performed by: INTERNAL MEDICINE

## 2019-03-13 PROCEDURE — 99499 UNLISTED E&M SERVICE: CPT | Mod: HCNC,S$GLB,, | Performed by: INTERNAL MEDICINE
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
## 2019-03-13 NOTE — ASSESSMENT & PLAN NOTE
doing well with apap 14-18.  27/30 greater 4 hours.  Residual ahi of 1.  Patient benefits from apap.  Patient request new unit.

## 2019-03-14 ENCOUNTER — PATIENT MESSAGE (OUTPATIENT)
Dept: FAMILY MEDICINE | Facility: CLINIC | Age: 74
End: 2019-03-14

## 2019-03-19 ENCOUNTER — CLINICAL SUPPORT (OUTPATIENT)
Dept: REHABILITATION | Facility: HOSPITAL | Age: 74
End: 2019-03-19
Payer: MEDICARE

## 2019-03-19 DIAGNOSIS — R26.89 BALANCE PROBLEMS: Primary | ICD-10-CM

## 2019-03-19 DIAGNOSIS — R29.898 LEFT LEG WEAKNESS: ICD-10-CM

## 2019-03-19 DIAGNOSIS — R26.9 GAIT ABNORMALITY: ICD-10-CM

## 2019-03-19 DIAGNOSIS — Z09 S/P ORTHOPEDIC SURGERY, FOLLOW-UP EXAM: ICD-10-CM

## 2019-03-19 DIAGNOSIS — M25.672 DECREASED RANGE OF MOTION OF LEFT ANKLE: ICD-10-CM

## 2019-03-19 DIAGNOSIS — Z74.09 IMPAIRED MOBILITY: ICD-10-CM

## 2019-03-19 PROCEDURE — G8979 MOBILITY GOAL STATUS: HCPCS | Mod: CK,HCNC,PN | Performed by: PHYSICAL THERAPIST

## 2019-03-19 PROCEDURE — G8978 MOBILITY CURRENT STATUS: HCPCS | Mod: CK,HCNC,PN | Performed by: PHYSICAL THERAPIST

## 2019-03-19 PROCEDURE — 97110 THERAPEUTIC EXERCISES: CPT | Mod: HCNC,PN | Performed by: PHYSICAL THERAPIST

## 2019-03-19 NOTE — PROGRESS NOTES
"  Physical Therapy Daily Treatment Note     Name: Janusz Montgomery Jr.  Clinic Number: [id number]    Therapy Diagnosis:   Encounter Diagnoses   Name Primary?    Balance problems Yes    Decreased range of motion of left ankle     Gait abnormality     Impaired mobility     Left leg weakness     S/P orthopedic surgery, follow-up exam      Physician: Silvia Leroy PA-C    Visit Date: 3/19/2019    Physician Orders: evaluate and treat  Medical Diagnosis: Type III open fracture of left tibia and fibula with routine healing: S/P orthopedic surgery, ORIF left tibia  follow-up exam  Evaluation Date: 1/18/2019  Authorization Period Expiration: 12/31/2019  Plan of Care Certification Period: 4/18/2019  Visit #/Visits authorized: 8 / 20     Time In: 1515  Time Out: 1615  Total Billable Time:  60  minutes    Precautions: Standard    Subjective     Pt reports: the leg is feeling good and there is no pain. Thought it would be sore after the last session but it has felt great the past few days.       Response to previous treatment:  he ankle is feeling sore but not as bad as it does.  Functional change:  Functionally can do light work and walk 2 blocks but difficult to climb stairs.  Not doing heavy work or lifting anything heavy. More movement,       Pain: 0/10  Location: left ankle    Objective     Range of Motion/Strength:    Ankle   Left   Pain/Dysfunction     AROM PROM MMT     Plantarflexion 30    30 35    35 2+/5     Dorsiflexion  Knee ext 1      8 3       12 2+/5     Inversion (foot)  Calcaneal  10   10 15    20 2+/5     Eversion (foot)  Calcaneal  10    15 15    20                 Janusz received therapeutic exercises to develop strength, ROM, flexibility and balance / stability for 30 minutes including:    Leg press   Recumbent bike  Upright bike 8'   UE ergometer  Treadmill   Elliptical   Shuttle 9'      THERAPEUTIC EXERCISE  SUPINE    SIDELYING    PRONE    STANDING.  -heel cord stretches  30" x 4   -soleus stretches 30" " "x 4   -balance  =SL stable  15" x 4    =double leg airex narrow base eyes open 15" x 4   =double leg wide base airex eyes closed 15" x 4   =stagger stand ( medial heel -medial heel ) 15" x 4   -heel lifts 2x15    -foot raises 2 x 15   -arch lift x 40   -divers stand 20" x 3   -squats at table x 20   -rock fwd active DF 1' x 2 ea. On step   -Red disc     SITTING  -anterior tib stretch 30" x 4 on oval foam   -heel lifts 2x30  On 1/2 roll   -foot raise 2x30 on 1/2 roll   -arch lift x 20   -eversion x 20   -inversion x 20   -ankle patterns inv / DF= ever / PF; ever / DF = inv / PF    BAPS BOARD unloaded standing position off edge of table. CW/CC, DF/PF, INVER / EVER     MANUAL THERAPY: Passive plantarflexion to stretch the dorsum of the foot and ankle as well as passive DF and toe flexor stretch. Passive inversion also performed. Patient was also instructed in such activity. NP  MODALITY  DIRECT EDUCATION:   Discussed importance of follow up an constant movement of the ankle. Reviewed gait pattern with return demonstration emphasis on heel strike and DF along woith toe off NP   Patient was instructed to resume previous HEP provided in 12/2018  Written Home Exercises Reviewed.    Patient was issued HEP 2/26/2019  Printed Home Exercises Reviewed.   Pt demo good understanding of the education provided. Patient demonstrated good return demonstration of activities       See EMR under Patient Instructions for exercises provided 2/26/2019.    Pt demo good understanding of the education provided. Patient demonstrated good return demonstration of activities  Encouraged manual stretching for the evertors and dorsiflexors.   Janusz received the following manual therapy techniques:  IASTM over the anterior leg and foot for NP  Janusz participated in neuromuscular re-education activities to improve balance and stability        FOTO Visit # 8  CMS Impairment/Limitation/Restriction for FOTO Lower Leg (w/o Knee) Survey  Status Limitation " G-Code CMS Severity Modifier  Intake 41% 59%  Predicted 50% 50% Goal Status+ CK - At least 40 percent but less than 60 percent  3/19/2019 46% 54% Current Status CK - At least 40 percent but less than 60 percent  D/C Status CK **only report if this is discharge survey  +Based on FOTO predicted change score  [facility] [place]  FUNCTIONAL STATUS SUMMARY (1/18/2019)  Patient: JACKIE GERMAIN ([id number]) Primary Body Part: Lower Leg (w/o Knee) Initial DOS: 1/18/2019  Produced and © [id number] by  [employer]          Assessment     The patient demonstrates some slight  Improvement in ankle mobility. Passive intra-articular end feel is restrictive, not abrupt, but is a capsular hard end point at the tibio-talar and sub-talar joints. He is responding to exercises well at this time.   Jackie is progressing  towards his goals.   Pt prognosis is Fair.   Pt will continue to benefit from skilled outpatient physical therapy to address the deficits listed in the problem list box on initial evaluation, provide pt/family education and to maximize pt's level of independence in the home and community environment.   Pt's spiritual, cultural and educational needs considered and pt agreeable to plan of care and goals.     Anticipated barriers to physical therapy: compliance with HEP which he has not demonstrated to this point.     Goals:   Short Term Goals (4 Weeks):      1.Pt to increase strength by a 1/2 grade of muscles test to allow for improvement in functional activities such as performing chores.  2.Pt to improve passive range of motion by 5 to 10 degrees or greater to allow for improved joint and functional mobility   3.Pt to report compliance with HEP and demonstrate proper exercise technique to PT to show competence with self management of condition.  4.Eliminate forefoot  pain by during walking  5. Demonstrate improved neuromuscular response to unilateral stand     Long Term Goals  (8 Weeks):      1. Increase AROM by5 to 10 degrees or greater in dorsiflexion / plantarflexion / inversion / eversion to allow improved joint biomechanics during gait   2.Increase leg muscle strength one grade  3. Independent with home exercise program.   4. Return to functional activities with manageable complaints.  5. Patient to demonstrate improved gait pattern with equal step length, improved heel strike and toe off.      Plan     Certification Period: 1/18/2019 to 4/18/2019  Recommended Treatment Plan: 2 times per week for 12 weeks: Manual Therapy, Neuromuscular Re-ed, Patient Education and Therapeutic Exercise  Other Recommendations: modalities PRN  Pt may be seen by PTA as part of the rehabilitation team.        Yusuf Farooq, PT

## 2019-03-20 ENCOUNTER — TELEPHONE (OUTPATIENT)
Dept: FAMILY MEDICINE | Facility: CLINIC | Age: 74
End: 2019-03-20

## 2019-03-20 DIAGNOSIS — I48.0 PAROXYSMAL ATRIAL FIBRILLATION: ICD-10-CM

## 2019-03-20 NOTE — TELEPHONE ENCOUNTER
----- Message from Jamila Kay sent at 3/20/2019  3:27 PM CDT -----  Contact: Janusz [phone]  Type: Patient Call Back    Who called:Janusz     What is the request in detail: The patient is returning a call to Ms. Michela Pantoja call back number:[phone]

## 2019-03-20 NOTE — TELEPHONE ENCOUNTER
----- Message from Miguelina Weathers MD sent at 3/19/2019  5:43 PM CDT -----  Patient returned your call. Please call again. Patient would like to speak to Michela who called him. He can be reached at [phone].

## 2019-03-21 ENCOUNTER — PATIENT MESSAGE (OUTPATIENT)
Dept: ORTHOPEDICS | Facility: CLINIC | Age: 74
End: 2019-03-21

## 2019-03-21 ENCOUNTER — LAB VISIT (OUTPATIENT)
Dept: LAB | Facility: HOSPITAL | Age: 74
End: 2019-03-21
Attending: RADIOLOGY
Payer: MEDICARE

## 2019-03-21 ENCOUNTER — CLINICAL SUPPORT (OUTPATIENT)
Dept: REHABILITATION | Facility: HOSPITAL | Age: 74
End: 2019-03-21
Payer: MEDICARE

## 2019-03-21 DIAGNOSIS — Z74.09 IMPAIRED MOBILITY: ICD-10-CM

## 2019-03-21 DIAGNOSIS — R26.9 GAIT ABNORMALITY: ICD-10-CM

## 2019-03-21 DIAGNOSIS — C61 PROSTATE CANCER: ICD-10-CM

## 2019-03-21 DIAGNOSIS — R26.89 BALANCE PROBLEMS: ICD-10-CM

## 2019-03-21 DIAGNOSIS — R35.0 URINARY FREQUENCY: ICD-10-CM

## 2019-03-21 DIAGNOSIS — M25.672 DECREASED RANGE OF MOTION OF LEFT ANKLE: Primary | ICD-10-CM

## 2019-03-21 DIAGNOSIS — R29.898 LEFT LEG WEAKNESS: ICD-10-CM

## 2019-03-21 PROCEDURE — 97110 THERAPEUTIC EXERCISES: CPT | Mod: HCNC,PN | Performed by: PHYSICAL THERAPIST

## 2019-03-21 PROCEDURE — 84153 ASSAY OF PSA TOTAL: CPT | Mod: HCNC

## 2019-03-21 PROCEDURE — 36415 COLL VENOUS BLD VENIPUNCTURE: CPT | Mod: HCNC,PO

## 2019-03-21 RX ORDER — TAMSULOSIN HYDROCHLORIDE 0.4 MG/1
1 CAPSULE ORAL DAILY
Qty: 90 CAPSULE | Refills: 3 | Status: SHIPPED | OUTPATIENT
Start: 2019-03-21 | End: 2020-06-11 | Stop reason: SDUPTHER

## 2019-03-21 NOTE — PROGRESS NOTES
"  Physical Therapy Daily Treatment Note     Name: Janusz Montgomery Jr.  Clinic Number: [id number]    Therapy Diagnosis:   Encounter Diagnoses   Name Primary?    Decreased range of motion of left ankle Yes    Left leg weakness     Gait abnormality     Impaired mobility     Balance problems      Physician: Silvia Leroy PA-C    Visit Date: 3/21/2019    Physician Orders: evaluate and treat  Medical Diagnosis: Type III open fracture of left tibia and fibula with routine healing: S/P orthopedic surgery, ORIF left tibia  follow-up exam  Evaluation Date: 1/18/2019  Authorization Period Expiration: 12/31/2019  Plan of Care Certification Period: 4/18/2019  Visit #/Visits authorized: 9 / 20     Time In: 1410  Time Out: 1510  Total Billable Time: 60   minutes    Precautions: Standard    Subjective     Pt reports: the ankle is feeling more mobile and able to raise the heels.   Response to previous treatment: felt good after the last session. Hurt some later that night but the next day it felt good.   Functional change:  Functionally can do light work and walk 2 blocks but difficult to climb stairs.  Not doing heavy work or lifting anything heavy. More movement, Uneven surfaces or unstable surfaces are difficult to walk on.       Pain: 0/10  Location: left ankle    Objective     Range of Motion/Strength:    Ankle   Left   Pain/Dysfunction     AROM PROM MMT     Plantarflexion 30    30 35    35 2+/5     Dorsiflexion  Knee ext 1      8 3       12 2+/5     Inversion (foot)  Calcaneal  10   10 15    20 2+/5     Eversion (foot)  Calcaneal  10    15 15    20                 Janusz received therapeutic exercises to develop strength, ROM, flexibility and balance / stability for 30 minutes including:    Leg press   Recumbent bike  Upright bike 8'   UE ergometer  Treadmill   Elliptical   Shuttle 9'      THERAPEUTIC EXERCISE  SUPINE    SIDELYING    PRONE    STANDING.  -heel cord stretches  30" x 4   -soleus stretches 30" x 4 " "  -balance  =SL stable  15" x 4    =double leg airex narrow base eyes open 15" x 4   =double leg wide base airex eyes closed 15" x 4   =stagger stand ( medial heel -medial heel ) 15" x 4   -heel lifts 2x15    -foot raises 2 x 15   -arch lift x 40   -divers stand 20" x 3   -squats at table x 20   -rock fwd active DF 1' x 2 ea. On step   -Red disc     SITTING  -anterior tib stretch 30" x 4 on oval foam   -heel lifts 2x30  On 1/2 roll   -foot raise 2x30 on 1/2 roll   -arch lift x 20   -eversion x 20   -inversion x 20   -ankle patterns inv / DF= ever / PF; ever / DF = inv / PF    BAPS BOARD unloaded standing position off edge of table. CW/CC, DF/PF, INVER / EVER     MANUAL THERAPY: Passive plantarflexion to stretch the dorsum of the foot and ankle as well as passive DF and toe flexor stretch. Passive inversion also performed. Patient was also instructed in such activity. NP  MODALITY  DIRECT EDUCATION:   Discussed importance of follow up an constant movement of the ankle. Reviewed gait pattern with return demonstration emphasis on heel strike and DF along woith toe off NP   Patient was instructed to resume previous HEP provided in 12/2018  Written Home Exercises Reviewed.    Patient was issued HEP 2/26/2019  Printed Home Exercises Reviewed.   Pt demo good understanding of the education provided. Patient demonstrated good return demonstration of activities       See EMR under Patient Instructions for exercises provided 2/26/2019.  7  Pt demo good understanding of the education provided. Patient demonstrated good return demonstration of activities  Encouraged manual stretching for the evertors and dorsiflexors.   Janusz received the following manual therapy techniques:  IASTM over the anterior leg and foot for NP  Janusz participated in neuromuscular re-education activities to improve balance and stability        FOTO Visit # 8  CMS Impairment/Limitation/Restriction for FOTO Lower Leg (w/o Knee) Survey  Status Limitation " G-Code CMS Severity Modifier  Intake 41% 59%  Predicted 50% 50% Goal Status+ CK - At least 40 percent but less than 60 percent  3/19/2019 46% 54% Current Status CK - At least 40 percent but less than 60 percent  D/C Status CK **only report if this is discharge survey  +Based on FOTO predicted change score  [facility] [place]  FUNCTIONAL STATUS SUMMARY (1/18/2019)  Patient: JACKIE GERMAIN ([id number]) Primary Body Part: Lower Leg (w/o Knee) Initial DOS: 1/18/2019  Produced and © [id number] by  [employer]          Assessment     The patient completed all activities as noted. He did not encounter any challenges or difficulties with the activities.  Demonstrated satisfactory response to balance activities.   Jackie is progressing  towards his goals.   Pt prognosis is Fair.   Pt will continue to benefit from skilled outpatient physical therapy to address the deficits listed in the problem list box on initial evaluation, provide pt/family education and to maximize pt's level of independence in the home and community environment.   Pt's spiritual, cultural and educational needs considered and pt agreeable to plan of care and goals.     Anticipated barriers to physical therapy: compliance with HEP which he has not demonstrated to this point.     Goals:   Short Term Goals (4 Weeks):      1.Pt to increase strength by a 1/2 grade of muscles test to allow for improvement in functional activities such as performing chores.  2.Pt to improve passive range of motion by 5 to 10 degrees or greater to allow for improved joint and functional mobility   3.Pt to report compliance with HEP and demonstrate proper exercise technique to PT to show competence with self management of condition.  4.Eliminate forefoot  pain by during walking  5. Demonstrate improved neuromuscular response to unilateral stand     Long Term Goals (8 Weeks):      1. Increase AROM by5 to 10 degrees or greater in  dorsiflexion / plantarflexion / inversion / eversion to allow improved joint biomechanics during gait   2.Increase leg muscle strength one grade  3. Independent with home exercise program.   4. Return to functional activities with manageable complaints.  5. Patient to demonstrate improved gait pattern with equal step length, improved heel strike and toe off.      Plan     Certification Period: 1/18/2019 to 4/18/2019  Recommended Treatment Plan: 2 times per week for 12 weeks: Manual Therapy, Neuromuscular Re-ed, Patient Education and Therapeutic Exercise  Other Recommendations: modalities PRN  Pt may be seen by PTA as part of the rehabilitation team.        Yusuf Farooq, PT

## 2019-03-22 LAB — COMPLEXED PSA SERPL-MCNC: 0.02 NG/ML

## 2019-03-26 ENCOUNTER — OFFICE VISIT (OUTPATIENT)
Dept: OPTOMETRY | Facility: CLINIC | Age: 74
End: 2019-03-26
Payer: MEDICARE

## 2019-03-26 ENCOUNTER — TELEPHONE (OUTPATIENT)
Dept: DERMATOLOGY | Facility: CLINIC | Age: 74
End: 2019-03-26

## 2019-03-26 DIAGNOSIS — H57.13 PAIN OF BOTH EYES: Primary | ICD-10-CM

## 2019-03-26 DIAGNOSIS — H04.123 DRY EYE SYNDROME, BILATERAL: ICD-10-CM

## 2019-03-26 DIAGNOSIS — B00.52 HERPES SIMPLEX DISCIFORM KERATITIS: ICD-10-CM

## 2019-03-26 PROCEDURE — 92012 INTRM OPH EXAM EST PATIENT: CPT | Mod: HCNC,S$GLB,, | Performed by: OPTOMETRIST

## 2019-03-26 PROCEDURE — 99999 PR PBB SHADOW E&M-EST. PATIENT-LVL II: ICD-10-PCS | Mod: PBBFAC,HCNC,, | Performed by: OPTOMETRIST

## 2019-03-26 PROCEDURE — 99999 PR PBB SHADOW E&M-EST. PATIENT-LVL II: CPT | Mod: PBBFAC,HCNC,, | Performed by: OPTOMETRIST

## 2019-03-26 PROCEDURE — 92012 PR EYE EXAM, EST PATIENT,INTERMED: ICD-10-PCS | Mod: HCNC,S$GLB,, | Performed by: OPTOMETRIST

## 2019-03-26 NOTE — PROGRESS NOTES
Subjective:       Patient ID: Janusz Montgomery Jr. is a 74 y.o. male      Chief Complaint   Patient presents with    Eye Problem     History of Present Illness  Dls: 9/25/18 Dr. Tobias    73 y/o male presents today with c/o x 3 days pain os> od 3-4 today 1-2 ou swollen lids os >od fbs os worse when blinking some itching ou off/on no changes in vision no tearing no mucous. Pt using refresh liquigel ou 4-6 times a day systane ou prn.  Pt states symptoms worse in the AM when he wakes up. Sleeps with CPAP machine    Acyclovir 800mg BID PO        Assessment/Plan:     1. Pain of both eyes  2. Dry eye syndrome, bilateral  Immediate TBUT. Dryness causing eye discomfort. Pt uses CPAP at night. Switch to ann at night and can use gel/tear QID daytime.     3. Herpes simplex disciform keratitis  Quiet. No dendrite on exam. Continue acyclovir 800 mg BID PO    Follow up if symptoms worsen or fail to improve.

## 2019-03-26 NOTE — TELEPHONE ENCOUNTER
Pt is now rescheduled for mohs surgery on 4/4 at 740 am for BCC on L nasal bridge and 4/11 at 750 am for L posterior ear. Ok to stay on ASA and Eliquis. Pt confirmed date, time and location.

## 2019-03-26 NOTE — TELEPHONE ENCOUNTER
----- Message from Jigna Stoll MA sent at 3/25/2019  5:01 PM CDT -----  Contact: Patient      ----- Message -----  From: Angelique Rajput  Sent: 3/25/2019   4:48 PM  To: Kami Shen S Staff    Patient would like to reschedule his surgery that he had to cancel      Callback: [phone]    Thank you

## 2019-03-27 NOTE — PROGRESS NOTES
Last 5 Patient Entered Readings                                      Current 30 Day Average: 141/70     Recent Readings 3/26/2019 3/12/2019 3/3/2019 3/3/2019 2/25/2019    SBP (mmHg) 139 134 149 127 142    DBP (mmHg) 76 66 71 69 69    Pulse 70 86 72 73 78        [facility]: [facility]  Follow Up    Left voicemail to follow up with Mr. Janusz Montgomery Jr..  Current BP average 141/70 mmHg is not at goal, <130/80 mmHg.  2nd attempt. Will follow up in 2 weeks.

## 2019-03-28 ENCOUNTER — PATIENT MESSAGE (OUTPATIENT)
Dept: ORTHOPEDICS | Facility: CLINIC | Age: 74
End: 2019-03-28

## 2019-03-28 ENCOUNTER — OFFICE VISIT (OUTPATIENT)
Dept: PODIATRY | Facility: CLINIC | Age: 74
End: 2019-03-28
Payer: MEDICARE

## 2019-03-28 VITALS
SYSTOLIC BLOOD PRESSURE: 166 MMHG | HEIGHT: 73 IN | BODY MASS INDEX: 41.75 KG/M2 | DIASTOLIC BLOOD PRESSURE: 78 MMHG | WEIGHT: 315 LBS

## 2019-03-28 DIAGNOSIS — Z87.2 HEALED ULCER OF LEFT FOOT ON EXAMINATION: ICD-10-CM

## 2019-03-28 DIAGNOSIS — I87.8 VENOUS STASIS OF LOWER EXTREMITY: ICD-10-CM

## 2019-03-28 DIAGNOSIS — I73.9 PVD (PERIPHERAL VASCULAR DISEASE): Primary | ICD-10-CM

## 2019-03-28 DIAGNOSIS — L90.9 PLANTAR FAT PAD ATROPHY: ICD-10-CM

## 2019-03-28 DIAGNOSIS — L84 PRE-ULCERATIVE CALLUSES: ICD-10-CM

## 2019-03-28 DIAGNOSIS — Z09 S/P ORTHOPEDIC SURGERY, FOLLOW-UP EXAM: Primary | ICD-10-CM

## 2019-03-28 PROCEDURE — 99212 OFFICE O/P EST SF 10 MIN: CPT | Mod: HCNC,S$GLB,, | Performed by: PODIATRIST

## 2019-03-28 PROCEDURE — 99499 UNLISTED E&M SERVICE: CPT | Mod: HCNC,S$GLB,, | Performed by: PODIATRIST

## 2019-03-28 PROCEDURE — 99999 PR PBB SHADOW E&M-EST. PATIENT-LVL III: ICD-10-PCS | Mod: PBBFAC,HCNC,, | Performed by: PODIATRIST

## 2019-03-28 PROCEDURE — 1101F PT FALLS ASSESS-DOCD LE1/YR: CPT | Mod: HCNC,CPTII,S$GLB, | Performed by: PODIATRIST

## 2019-03-28 PROCEDURE — 3077F SYST BP >= 140 MM HG: CPT | Mod: HCNC,CPTII,S$GLB, | Performed by: PODIATRIST

## 2019-03-28 PROCEDURE — 3078F PR MOST RECENT DIASTOLIC BLOOD PRESSURE < 80 MM HG: ICD-10-PCS | Mod: HCNC,CPTII,S$GLB, | Performed by: PODIATRIST

## 2019-03-28 PROCEDURE — 99212 PR OFFICE/OUTPT VISIT, EST, LEVL II, 10-19 MIN: ICD-10-PCS | Mod: HCNC,S$GLB,, | Performed by: PODIATRIST

## 2019-03-28 PROCEDURE — 99999 PR PBB SHADOW E&M-EST. PATIENT-LVL III: CPT | Mod: PBBFAC,HCNC,, | Performed by: PODIATRIST

## 2019-03-28 PROCEDURE — 3078F DIAST BP <80 MM HG: CPT | Mod: HCNC,CPTII,S$GLB, | Performed by: PODIATRIST

## 2019-03-28 PROCEDURE — 1101F PR PT FALLS ASSESS DOC 0-1 FALLS W/OUT INJ PAST YR: ICD-10-PCS | Mod: HCNC,CPTII,S$GLB, | Performed by: PODIATRIST

## 2019-03-28 PROCEDURE — 99499 RISK ADDL DX/OHS AUDIT: ICD-10-PCS | Mod: HCNC,S$GLB,, | Performed by: PODIATRIST

## 2019-03-28 PROCEDURE — 3077F PR MOST RECENT SYSTOLIC BLOOD PRESSURE >= 140 MM HG: ICD-10-PCS | Mod: HCNC,CPTII,S$GLB, | Performed by: PODIATRIST
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
## 2019-04-02 ENCOUNTER — OFFICE VISIT (OUTPATIENT)
Dept: RADIATION ONCOLOGY | Facility: CLINIC | Age: 74
End: 2019-04-02
Payer: MEDICARE

## 2019-04-02 ENCOUNTER — PATIENT MESSAGE (OUTPATIENT)
Dept: DERMATOLOGY | Facility: CLINIC | Age: 74
End: 2019-04-02

## 2019-04-02 VITALS
SYSTOLIC BLOOD PRESSURE: 140 MMHG | DIASTOLIC BLOOD PRESSURE: 65 MMHG | RESPIRATION RATE: 16 BRPM | HEART RATE: 84 BPM | HEIGHT: 73 IN | WEIGHT: 315 LBS | BODY MASS INDEX: 41.75 KG/M2

## 2019-04-02 DIAGNOSIS — C61 PROSTATE CANCER: Primary | ICD-10-CM

## 2019-04-02 PROCEDURE — 99999 PR PBB SHADOW E&M-EST. PATIENT-LVL III: ICD-10-PCS | Mod: PBBFAC,HCNC,, | Performed by: RADIOLOGY

## 2019-04-02 PROCEDURE — 99212 PR OFFICE/OUTPT VISIT, EST, LEVL II, 10-19 MIN: ICD-10-PCS | Mod: HCNC,S$GLB,, | Performed by: RADIOLOGY

## 2019-04-02 PROCEDURE — 1101F PR PT FALLS ASSESS DOC 0-1 FALLS W/OUT INJ PAST YR: ICD-10-PCS | Mod: HCNC,CPTII,S$GLB, | Performed by: RADIOLOGY

## 2019-04-02 PROCEDURE — 99212 OFFICE O/P EST SF 10 MIN: CPT | Mod: HCNC,S$GLB,, | Performed by: RADIOLOGY

## 2019-04-02 PROCEDURE — 1101F PT FALLS ASSESS-DOCD LE1/YR: CPT | Mod: HCNC,CPTII,S$GLB, | Performed by: RADIOLOGY

## 2019-04-02 PROCEDURE — 99999 PR PBB SHADOW E&M-EST. PATIENT-LVL III: CPT | Mod: PBBFAC,HCNC,, | Performed by: RADIOLOGY
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
## 2019-04-03 NOTE — PROGRESS NOTES
Last 5 Patient Entered Readings                                      Current 30 Day Average: 137/74     Recent Readings 4/3/2019 4/1/2019 3/26/2019 3/12/2019 3/3/2019    SBP (mmHg) 150 126 139 134 149    DBP (mmHg) 87 65 76 66 71    Pulse 70 70 70 86 72          HPI:  Called patient to follow up. Patient endorses adherence to medication regimen, tolerating olmesartan. States he felt over heated and that is why he took BP 4/3. States he ate a spicy dish. Patient denies hypotensive s/sx (lightheadedness, dizziness, nausea, fatigue); patient denies hypertensive s/sx (SOB, CP, severe headaches, changes in vision).     Assessment:  Reviewed recent readings. Per 2017 ACC/ AHA HTN guidelines (goal of BP < 130/80), current 30-day average is improving but remains above goal.     Plan:  Continue current medication regimen. Due to BP fluctuations, requested at least 2-3 BP readings per week to better assess. I will continue to monitor regularly and will follow-up in 2 to 3 weeks, sooner if blood pressure begins to trend upward or downward.     Current medication regimen:  Hypertension Medications             furosemide (LASIX) 20 MG tablet Take 1 tablet (20 mg total) by mouth once daily.    olmesartan (BENICAR) 20 MG tablet Take 1 tablet (20 mg total) by mouth once daily. (replaces ramipril for blood pressure)          Patient denies having questions or concerns. Patient has my contact information and knows to call with any concerns or clinical changes.

## 2019-04-04 ENCOUNTER — PROCEDURE VISIT (OUTPATIENT)
Dept: DERMATOLOGY | Facility: CLINIC | Age: 74
End: 2019-04-04
Payer: MEDICARE

## 2019-04-04 ENCOUNTER — TELEPHONE (OUTPATIENT)
Dept: DERMATOLOGY | Facility: CLINIC | Age: 74
End: 2019-04-04

## 2019-04-04 VITALS
HEART RATE: 57 BPM | DIASTOLIC BLOOD PRESSURE: 66 MMHG | SYSTOLIC BLOOD PRESSURE: 113 MMHG | WEIGHT: 315 LBS | HEIGHT: 73 IN | BODY MASS INDEX: 41.75 KG/M2

## 2019-04-04 DIAGNOSIS — C44.311 BASAL CELL CARCINOMA OF LEFT SIDE OF NOSE: Primary | ICD-10-CM

## 2019-04-04 DIAGNOSIS — R06.02 SOB (SHORTNESS OF BREATH): Primary | ICD-10-CM

## 2019-04-04 PROCEDURE — 99499 NO LOS: ICD-10-PCS | Mod: HCNC,S$GLB,, | Performed by: DERMATOLOGY

## 2019-04-04 PROCEDURE — 17311 MOHS 1 STAGE H/N/HF/G: CPT | Mod: HCNC,S$GLB,, | Performed by: DERMATOLOGY

## 2019-04-04 PROCEDURE — 17312 MOHS ADDL STAGE: CPT | Mod: HCNC,S$GLB,, | Performed by: DERMATOLOGY

## 2019-04-04 PROCEDURE — 17312: ICD-10-PCS | Mod: HCNC,S$GLB,, | Performed by: DERMATOLOGY

## 2019-04-04 PROCEDURE — 99499 UNLISTED E&M SERVICE: CPT | Mod: HCNC,S$GLB,, | Performed by: DERMATOLOGY

## 2019-04-04 PROCEDURE — 13152 PR RECMPL WND LID,NOS,EAR 2.6-7.5 CM: ICD-10-PCS | Mod: 51,HCNC,S$GLB, | Performed by: DERMATOLOGY

## 2019-04-04 PROCEDURE — 17311: ICD-10-PCS | Mod: HCNC,S$GLB,, | Performed by: DERMATOLOGY

## 2019-04-04 PROCEDURE — 13152 CMPLX RPR E/N/E/L 2.6-7.5 CM: CPT | Mod: 51,HCNC,S$GLB, | Performed by: DERMATOLOGY

## 2019-04-04 RX ORDER — OXYCODONE AND ACETAMINOPHEN 5; 325 MG/1; MG/1
1 TABLET ORAL
Qty: 20 TABLET | Refills: 0 | Status: SHIPPED | OUTPATIENT
Start: 2019-04-04 | End: 2019-04-11 | Stop reason: SDUPTHER

## 2019-04-04 NOTE — PROGRESS NOTES
PROCEDURE: Mohs' Micrographic Surgery    INDICATION: Location in mask areas of face including central face, nose, eyelids, eyebrows, lips, chin, preauricular, temple, and ear. Biopsy-proven skin cancer of cosmetically and functionally important areas, including head, neck, genital, hand, foot, or areas known for having difficulty in healing, such as the lower anterior legs. Tumor with ill-defined borders.    REFERRING MD: Bianca Kumar M.D.    CASE NUMBER:     ANESTHETIC: 4 cc 0.5% Lidocaine with Epi 1:200,000 mixed 1:1 with 0.5% Bupivacaine    SURGICAL PREP: Hibiclens    SURGEON: Hermelinda Mcclure MD    ASSISTANTS: Dorothy Cannon PA-C and Cat Chamorro, Surg Tech    PREOPERATIVE DIAGNOSIS: basal cell carcinoma    POSTOPERATIVE DIAGNOSIS: basal cell carcinoma    PATHOLOGIC DIAGNOSIS: basal cell carcinoma- nodular, superficial; SCCIS    HISTOLOGY OF SPECIMENS IN FIRST STAGE:   Tumor Type: Tumor seen. Superficial basal cell carcinoma: Foci of basaloid cells with peripheral palisading and focal retraction artifact arising along the dermoepidermal junction and extending into the papillary dermis.   Depth of Invasion: epidermis and dermis  Perineural Invasion: No    HISTOLOGY OF SPECIMENS IN SUBSEQUENT STAGES:  Tumor Type: Tumor seen. Same as in first stage.  Squamous cell carcinoma in situ: Epidermis with full-thickness atypia and variable epidermal maturation.   Depth of Invasion: epidermis and dermis  Perineural Invasion: No    STAGES OF MOHS' SURGERY PERFORMED: 4    TUMOR-FREE PLANE ACHIEVED: Yes    HEMOSTASIS: electrocoagulation     SPECIMENS: 5 (2 in stage A, 1 in stage B, 1 in stage C and 1 in stage D)    LOCATION: left nasal bridge. Patient verified location with hand held mirror.    INITIAL LESION SIZE: 0.4 x 0.5 cm    FINAL DEFECT SIZE: 0.8 x 1.3 cm    WOUND REPAIR/DISPOSITION: The patient tolerated Mohs' Micrographic Surgery for a basal cell carcinoma very well. When the tumor was completely removed, a  "repair of the surgical defect was undertaken.      PROCEDURE: Complex Linear Repair    INDICATION: Status post Mohs' Micrographic Surgery for basal cell carcinoma.    CASE NUMBER:     SURGEON: Hermelinda Mcclure MD    ASSISTANTS: Dorothy Cannon PA-C, Cat Chamorro, Surg Tech, and Rom Campuzano MD    ANESTHETIC: 2 cc 1% Lidocaine with Epinephrine 1:100,000    SURGICAL PREP: Hibiclens    LOCATION: left nasal bridge    DEFECT SIZE: 0.8 x 1.3 cm    WOUND REPAIR/DISPOSITION:  After the patient's carcinoma had been completely removed with Mohs' Micrographic Surgery, a repair of the surgical defect was undertaken. The patient was returned to the operating suite where the area of left nasal bridge was prepped, draped, and anesthetized in the usual sterile fashion. The wound was widely undermined in all directions. Then, electrocoagulation was used to obtain meticulous hemostasis. 5-0 Vicryl buried vertical mattress sutures were placed into the subcutaneous and dermal plane to close the wound and ankur the cutaneous wound edge. Bilateral dog ears were identified and were removed by a standard Burow's triangle technique. The cutaneous wound edges were closed using interrupted 5-0 Prolene suture.    The patient tolerated the procedure well.    The area was cleaned and dressed appropriately and the patient was given wound care instructions, as well as appointment for follow-up evaluation. Patient was placed on Percocet 5 prn postop pain.    LENGTH OF REPAIR: 2.7 cm    Vitals:    04/04/19 0818 04/04/19 1206   BP: (P) 136/65 113/66   BP Location: (P) Right arm Right arm   Patient Position: (P) Sitting Sitting   BP Method: (P) Large (Automatic) Large (Automatic)   Pulse: (P) 87 (!) 57   Weight: (!) 164.7 kg (363 lb)    Height: 6' 1" (1.854 m)          "

## 2019-04-04 NOTE — TELEPHONE ENCOUNTER
----- Message from Shari Mejía sent at 4/4/2019  7:40 AM CDT -----  Contact: Wife  Wife is calling to reschedule the pt's appt due to bad weather.  Wife called at 7:40 am and requests a returned call.    She can be reached at [phone].    Thank you.

## 2019-04-04 NOTE — TELEPHONE ENCOUNTER
Contacted Pt, he stated that they were running late and would be at the clinic shortly. No need to reschedule appt.

## 2019-04-10 ENCOUNTER — OFFICE VISIT (OUTPATIENT)
Dept: ORTHOPEDICS | Facility: CLINIC | Age: 74
End: 2019-04-10
Payer: MEDICARE

## 2019-04-10 ENCOUNTER — HOSPITAL ENCOUNTER (OUTPATIENT)
Dept: RADIOLOGY | Facility: HOSPITAL | Age: 74
Discharge: HOME | End: 2019-04-10
Attending: ORTHOPAEDIC SURGERY
Payer: MEDICARE

## 2019-04-10 DIAGNOSIS — S82.202F TYPE III OPEN FRACTURE OF LEFT TIBIA AND FIBULA WITH ROUTINE HEALING: ICD-10-CM

## 2019-04-10 DIAGNOSIS — M25.672 DECREASED RANGE OF MOTION OF LEFT ANKLE: Primary | ICD-10-CM

## 2019-04-10 DIAGNOSIS — Z09 S/P ORTHOPEDIC SURGERY, FOLLOW-UP EXAM: ICD-10-CM

## 2019-04-10 DIAGNOSIS — S82.402F TYPE III OPEN FRACTURE OF LEFT TIBIA AND FIBULA WITH ROUTINE HEALING: ICD-10-CM

## 2019-04-10 PROCEDURE — 73610 XR ANKLE COMPLETE 3 VIEW LEFT: ICD-10-PCS | Mod: 26,HCNC,LT, | Performed by: RADIOLOGY

## 2019-04-10 PROCEDURE — 1101F PR PT FALLS ASSESS DOC 0-1 FALLS W/OUT INJ PAST YR: ICD-10-PCS | Mod: CPTII,S$GLB,, | Performed by: ORTHOPAEDIC SURGERY

## 2019-04-10 PROCEDURE — 99214 OFFICE O/P EST MOD 30 MIN: CPT | Mod: S$GLB,,, | Performed by: ORTHOPAEDIC SURGERY

## 2019-04-10 PROCEDURE — 1101F PT FALLS ASSESS-DOCD LE1/YR: CPT | Mod: CPTII,S$GLB,, | Performed by: ORTHOPAEDIC SURGERY

## 2019-04-10 PROCEDURE — 99999 PR PBB SHADOW E&M-EST. PATIENT-LVL I: CPT | Mod: PBBFAC,,, | Performed by: ORTHOPAEDIC SURGERY

## 2019-04-10 PROCEDURE — 73610 X-RAY EXAM OF ANKLE: CPT | Mod: TC,HCNC,LT

## 2019-04-10 PROCEDURE — 99999 PR PBB SHADOW E&M-EST. PATIENT-LVL I: ICD-10-PCS | Mod: PBBFAC,,, | Performed by: ORTHOPAEDIC SURGERY

## 2019-04-10 PROCEDURE — 99214 PR OFFICE/OUTPT VISIT, EST, LEVL IV, 30-39 MIN: ICD-10-PCS | Mod: S$GLB,,, | Performed by: ORTHOPAEDIC SURGERY

## 2019-04-10 PROCEDURE — 73610 X-RAY EXAM OF ANKLE: CPT | Mod: 26,HCNC,LT, | Performed by: RADIOLOGY

## 2019-04-10 NOTE — PROGRESS NOTES
HPI:  Mr. Montgomery had a grade III open left distal tibia and fibula fracture treated with ORIFwho subsequently broke the proximal screws in the plate, and had a heel ulcer.  I took him back to the OR for spanning external fixation while we worked on getting the heel ulcer to heal, then took him back for revision ORIF with allograft of left distal tibia fracture nonunion on 4/13/17.    Today he reports doing well, pain is controlled, currently working with PT. He reports some frustration with his limited ankle ROM since the surgery. The ulcer over the medial malleolus is healing well and he reports good color and skin healing compared to last visit. The ulcer over the calcaneous has healed completely and scabbed over. He is now seeing wound care every 6 months for the ulcer and is compliant with care at home. He is still having medial sided pain near the top of the plate, exacerbated by excessive walking and relieved by rest. He has restarted one gabapentin daily, using two on days when the pain is especially bothersome.  Overall, he is walking well with no assistive devices and feels pretty good.     ROS:  Patient denies constitutional symptoms, cardiac symptoms, respiratory symptoms, GI symptoms.  The remainder of the musculoskeletal ROS is included in the HPI.    PE:    AA&O x 4.  NAD  HEENT:  NCAT, sclera nonicteric  Lungs:  Respirations are equal and unlabored.  CV:  2+ bilateral upper and lower extremity pulses.    PE:  Incisions well healed throughout.  There is one dime sized medial area with very superficial skin abrasion/irritation that appears to be only epidermal.  No underlying fluctuance or surrounding erythema.  Heel ulcer essentially healed with small hardened area remaining.      Rads:  Hwr intact.  Good alignment.  Fibula healed and tibia with bridging bone, significant improvement from last films.  Synostosis between tibia and fibula.    A/P:  2 years out now from revision of his fixation and he is  overall doing very well.  His heel ulcer has completely healed with only a small amount of remaining dry scab.  He has restarted neurontin, 300 BID which he was taking before.   Would strongly benefit from continued physical therapy for gait training/balance and calf stretching/strengthening. Return to clinic PRN.

## 2019-04-11 ENCOUNTER — PROCEDURE VISIT (OUTPATIENT)
Dept: DERMATOLOGY | Facility: CLINIC | Age: 74
End: 2019-04-11
Payer: MEDICARE

## 2019-04-11 VITALS
BODY MASS INDEX: 41.75 KG/M2 | SYSTOLIC BLOOD PRESSURE: 128 MMHG | HEART RATE: 71 BPM | DIASTOLIC BLOOD PRESSURE: 64 MMHG | HEIGHT: 73 IN | WEIGHT: 315 LBS

## 2019-04-11 DIAGNOSIS — C44.219 BASAL CELL CARCINOMA OF LEFT EAR: Primary | ICD-10-CM

## 2019-04-11 PROCEDURE — 14061 PR ADJ TISS XFER LID,NOS,EAR 10.1-30 SQCM: ICD-10-PCS | Mod: 79,HCNC,S$GLB, | Performed by: DERMATOLOGY

## 2019-04-11 PROCEDURE — 99499 NO LOS: ICD-10-PCS | Mod: HCNC,S$GLB,, | Performed by: DERMATOLOGY

## 2019-04-11 PROCEDURE — 14061 TIS TRNFR E/N/E/L10.1-30SQCM: CPT | Mod: 79,HCNC,S$GLB, | Performed by: DERMATOLOGY

## 2019-04-11 PROCEDURE — 99499 UNLISTED E&M SERVICE: CPT | Mod: HCNC,S$GLB,, | Performed by: DERMATOLOGY

## 2019-04-11 PROCEDURE — 17312: ICD-10-PCS | Mod: HCNC,S$GLB,, | Performed by: DERMATOLOGY

## 2019-04-11 PROCEDURE — 17311 MOHS 1 STAGE H/N/HF/G: CPT | Mod: 51,79,HCNC,S$GLB | Performed by: DERMATOLOGY

## 2019-04-11 PROCEDURE — 17311: ICD-10-PCS | Mod: 51,79,HCNC,S$GLB | Performed by: DERMATOLOGY

## 2019-04-11 PROCEDURE — 17312 MOHS ADDL STAGE: CPT | Mod: HCNC,S$GLB,, | Performed by: DERMATOLOGY

## 2019-04-11 RX ORDER — OXYCODONE AND ACETAMINOPHEN 5; 325 MG/1; MG/1
1 TABLET ORAL
Qty: 20 TABLET | Refills: 0 | Status: SHIPPED | OUTPATIENT
Start: 2019-04-11 | End: 2020-07-07

## 2019-04-11 RX ORDER — AZITHROMYCIN 250 MG/1
250 TABLET, FILM COATED ORAL SEE ADMIN INSTRUCTIONS
Qty: 6 TABLET | Refills: 0 | Status: SHIPPED | OUTPATIENT
Start: 2019-04-11 | End: 2019-05-08 | Stop reason: ALTCHOICE

## 2019-04-11 NOTE — PROGRESS NOTES
74 y.o. male patient is here for suture removal following Mohs' surgery.    Patient reports no problems with left side of nose.    WOUND PE:  The left side of nose sutures intact. Wound healing well. Good skin edges. No signs or symptoms of infection.      IMPRESSION:  Healing operative site from Mohs' surgery BCC, left side of nose s/p Mohs with CLC, postop day # 7    PLAN:  Sutures removed today. Steri-strips applied.  Continue wound care.  Keep moist with Aquaphor.    RTC:  In 1 week.

## 2019-04-11 NOTE — PROGRESS NOTES
Last 5 Patient Entered Readings                                      Current 30 Day Average: 137/74     Recent Readings 4/3/2019 4/1/2019 3/26/2019 3/12/2019 3/3/2019    SBP (mmHg) 150 126 139 134 149    DBP (mmHg) 87 65 76 66 71    Pulse 70 70 70 86 72        [facility]: Health  Follow Up    Left voicemail to follow up with Mr. Janusz Montgomery Jr..  Current BP average 137/74 mmHg is not at goal, <130/80 mmHg.  3rd attempt. Will follow up in 2 weeks.

## 2019-04-11 NOTE — PROGRESS NOTES
PROCEDURE: Mohs' Micrographic Surgery    INDICATION: Location in mask areas of face including central face, nose, eyelids, eyebrows, lips, chin, preauricular, temple, and ear. Biopsy-proven skin cancer of cosmetically and functionally important areas, including head, neck, genital, hand, foot, or areas known for having difficulty in healing, such as the lower anterior legs. Tumor with ill-defined borders.    REFERRING MD: Bianca Kumar M.D.    CASE NUMBER:     ANESTHETIC: 4 cc 0.5% Lidocaine with Epi 1:200,000 mixed 1:1 with 0.5% Bupivacaine    SURGICAL PREP: Betadine    SURGEON: Hermelinda Mcclure MD    ASSISTANTS: Cat Chamorro, Surg Tech    PREOPERATIVE DIAGNOSIS: basal cell carcinoma    POSTOPERATIVE DIAGNOSIS: basal cell carcinoma    PATHOLOGIC DIAGNOSIS: basal cell carcinoma- nodular    HISTOLOGY OF SPECIMENS IN FIRST STAGE:   Tumor Type: Tumor seen. Nodular basal cell carcinoma: Nodular tumor in dermis composed of basaloid cells exhibiting peripheral palisading and retraction artifact.   Depth of Invasion: epidermis and dermis  Perineural Invasion: No    HISTOLOGY OF SPECIMENS IN SUBSEQUENT STAGES:  · Tumor Type: No tumor seen.    STAGES OF MOHS' SURGERY PERFORMED: 2    TUMOR-FREE PLANE ACHIEVED: Yes    HEMOSTASIS: electrocoagulation     SPECIMENS: 4 (2 in stage A and 2 in stage B)    LOCATION: left posterior ear. Patient verified location by looking at photo taken prior to procedure.     INITIAL LESION SIZE: 0.6 x 0.6 cm    FINAL DEFECT SIZE: 1.3 x 1.3 cm    WOUND REPAIR/DISPOSITION: The patient tolerated Mohs' Micrographic Surgery for a basal cell carcinoma very well. When the tumor was completely removed, a repair of the surgical defect was undertaken.      PROCEDURE: Rhombic Transposition Flap (Adjacent Tissue Transfer)    INDICATION: Status post Mohs' Micrographic Surgery for basal cell carcinoma.    CASE NUMBER:     ANESTHETIC: 4 cc 1% Lidocaine with Epinephrine 1:100,000    SURGICAL  "PREP: Betadine    SURGEON: Hermelinda Mcclure MD    ASSISTANTS: Dorothy Cannon PA-C and Cat Chamorro Surg Tech    LOCATION: left posterior ear    WOUND REPAIR/DISPOSITION:  After the patient's carcinoma had been completely removed with Mohs' Micrographic Surgery, a repair of the surgical defect was undertaken. The patient was returned to the operating suite where the area of left posterior ear was prepped, draped, and anesthetized in the usual sterile fashion. A rhombic transposition flap was fashioned from the superior surrounding tissue of the left posterior ear and scalp. A # 15 blade was used to incise the flap. The area was widely undermined in the subcutaneous tissue plane. The flap was then elevated and transposed in to close the primary defect. Then, electrocoagulation was used to obtain meticulous hemostasis. The secondary defect was closed with 4-0 Vicryl sutures placed into the subcutaneous and dermal tissue plane. A small Burow's triangle was removed from the inferior aspect of the primary defect away from the flap in order to help with tissue movement. The flap was then advanced in by a complex closure using 4-0 Vicryl buried vertical mattress sutures placed in the subcutaneous and dermal tissue planes. The flap was trimmed to fit the defect. The cutaneous wound edges of the flap and secondary defect were closed with simple interrupted 4-0 Prolene and 5-0 Prolene sutures.    The patient tolerated the procedure well.    The area was cleaned and dressed appropriately and the patient was given wound care instructions, as well as an appointment for follow-up evaluation. Patient was placed on Percocet 5 prn postop pain and a Z-scottie.    SIZE OF FLAP: 12 cm squared    Vitals:    04/11/19 0724 04/11/19 1145   BP: 135/75 128/64   BP Location: Right arm Right arm   Patient Position: Sitting Sitting   BP Method: Large (Automatic) Large (Automatic)   Pulse: 86 71   Weight: (!) 164.7 kg (363 lb)    Height: 6' 1" (1.854 m) "

## 2019-04-18 ENCOUNTER — TELEPHONE (OUTPATIENT)
Dept: DERMATOLOGY | Facility: CLINIC | Age: 74
End: 2019-04-18

## 2019-04-18 ENCOUNTER — OFFICE VISIT (OUTPATIENT)
Dept: OPHTHALMOLOGY | Facility: CLINIC | Age: 74
End: 2019-04-18
Payer: MEDICARE

## 2019-04-18 ENCOUNTER — OFFICE VISIT (OUTPATIENT)
Dept: DERMATOLOGY | Facility: CLINIC | Age: 74
End: 2019-04-18
Payer: MEDICARE

## 2019-04-18 DIAGNOSIS — H02.59 FLOPPY EYELID SYNDROME: ICD-10-CM

## 2019-04-18 DIAGNOSIS — Z09 POSTOP CHECK: Primary | ICD-10-CM

## 2019-04-18 PROCEDURE — 99999 PR PBB SHADOW E&M-EST. PATIENT-LVL II: ICD-10-PCS | Mod: PBBFAC,HCNC,, | Performed by: OPHTHALMOLOGY

## 2019-04-18 PROCEDURE — 92012 PR EYE EXAM, EST PATIENT,INTERMED: ICD-10-PCS | Mod: HCNC,S$GLB,, | Performed by: OPHTHALMOLOGY

## 2019-04-18 PROCEDURE — 99999 PR PBB SHADOW E&M-EST. PATIENT-LVL II: CPT | Mod: PBBFAC,HCNC,, | Performed by: OPHTHALMOLOGY

## 2019-04-18 PROCEDURE — 99024 PR POST-OP FOLLOW-UP VISIT: ICD-10-PCS | Mod: HCNC,S$GLB,, | Performed by: DERMATOLOGY

## 2019-04-18 PROCEDURE — 99024 POSTOP FOLLOW-UP VISIT: CPT | Mod: HCNC,S$GLB,, | Performed by: DERMATOLOGY

## 2019-04-18 PROCEDURE — 92012 INTRM OPH EXAM EST PATIENT: CPT | Mod: HCNC,S$GLB,, | Performed by: OPHTHALMOLOGY

## 2019-04-18 RX ORDER — FLUOROMETHOLONE 1 MG/ML
1 SUSPENSION/ DROPS OPHTHALMIC 3 TIMES DAILY
Qty: 5 ML | Refills: 1 | Status: SHIPPED | OUTPATIENT
Start: 2019-04-18 | End: 2019-04-28

## 2019-04-18 NOTE — PROGRESS NOTES
HPI     Triage pt(03/26/19 )  Hx of HSV.  Patient states OS very gritty and rachel x this morning.  Painful-3 on pain scale only b/c he took meds.  No eye drops.  Taking Acyc 800mg 2x daily    I have personally interviewed the patient, reviewed the history and   examined the patient and agree with the technician's exam.      Last edited by Ilan Aranda MD on 4/18/2019  1:37 PM. (History)            Assessment /Plan     For exam results, see Encounter Report.    Floppy eyelid syndrome      Seen with Dr. Cooper. Will treat with topical corticosteroid and return next week.

## 2019-04-18 NOTE — TELEPHONE ENCOUNTER
Returned Mr. Montgomery phone call, spoke with his wife they wanted to move time up for S/R due to weather changed time to 11am

## 2019-04-18 NOTE — TELEPHONE ENCOUNTER
----- Message from Destinee Sebastian sent at 4/18/2019  8:44 AM CDT -----  Contact: pts wife   Kami - pt Patient Requesting Order    Order Needed: pts wife is calling to speak with the nurse pt is calling to reschedule his appt today unless the pt can come earlier this morning to remove his stiches pt is rescheduling due to the weather     Communication Preference: can you please call pts wife at [phone]    Additional Information: none    MELI

## 2019-04-18 NOTE — PROGRESS NOTES
74 y.o. male patient is here for suture removal following Mohs' surgery.    Patient reports no problems left posterior ear     WOUND PE:  The left posterior ear sutures intact. Wound healing well. Good skin edges. No signs or symptoms of infection. Flap is pink and viable.    IMPRESSION:  Healing operative site from Mohs' surgery BCC left posterior ear ,s/p Mohs with Rhombic Transpostion flap postop day #7    PLAN:  Sutures removed today. Steri-strips applied.  Continue wound care.  Keep moist with Aquaphor.  Offered 1 month check - pt to call if needed.    RTC:  In 3-6 months with Bianca Kumar M.D. for skin check or sooner if new concern arises.

## 2019-04-19 ENCOUNTER — OFFICE VISIT (OUTPATIENT)
Dept: URGENT CARE | Facility: CLINIC | Age: 74
End: 2019-04-19
Payer: MEDICARE

## 2019-04-19 VITALS
OXYGEN SATURATION: 97 % | HEART RATE: 74 BPM | HEIGHT: 73 IN | DIASTOLIC BLOOD PRESSURE: 68 MMHG | WEIGHT: 315 LBS | BODY MASS INDEX: 41.75 KG/M2 | SYSTOLIC BLOOD PRESSURE: 140 MMHG | TEMPERATURE: 97 F | RESPIRATION RATE: 16 BRPM

## 2019-04-19 DIAGNOSIS — J30.2 ACUTE SEASONAL ALLERGIC RHINITIS: Primary | ICD-10-CM

## 2019-04-19 DIAGNOSIS — J02.9 SORE THROAT: ICD-10-CM

## 2019-04-19 PROCEDURE — 1101F PR PT FALLS ASSESS DOC 0-1 FALLS W/OUT INJ PAST YR: ICD-10-PCS | Mod: CPTII,S$GLB,, | Performed by: SURGERY

## 2019-04-19 PROCEDURE — 1101F PT FALLS ASSESS-DOCD LE1/YR: CPT | Mod: CPTII,S$GLB,, | Performed by: SURGERY

## 2019-04-19 PROCEDURE — 3077F SYST BP >= 140 MM HG: CPT | Mod: CPTII,S$GLB,, | Performed by: SURGERY

## 2019-04-19 PROCEDURE — 3078F PR MOST RECENT DIASTOLIC BLOOD PRESSURE < 80 MM HG: ICD-10-PCS | Mod: CPTII,S$GLB,, | Performed by: SURGERY

## 2019-04-19 PROCEDURE — 96372 THER/PROPH/DIAG INJ SC/IM: CPT | Mod: 59,S$GLB,, | Performed by: SURGERY

## 2019-04-19 PROCEDURE — 99214 PR OFFICE/OUTPT VISIT, EST, LEVL IV, 30-39 MIN: ICD-10-PCS | Mod: 25,S$GLB,, | Performed by: SURGERY

## 2019-04-19 PROCEDURE — 96372 PR INJECTION,THERAP/PROPH/DIAG2ST, IM OR SUBCUT: ICD-10-PCS | Mod: 59,S$GLB,, | Performed by: SURGERY

## 2019-04-19 PROCEDURE — 3078F DIAST BP <80 MM HG: CPT | Mod: CPTII,S$GLB,, | Performed by: SURGERY

## 2019-04-19 PROCEDURE — 3077F PR MOST RECENT SYSTOLIC BLOOD PRESSURE >= 140 MM HG: ICD-10-PCS | Mod: CPTII,S$GLB,, | Performed by: SURGERY

## 2019-04-19 PROCEDURE — 99214 OFFICE O/P EST MOD 30 MIN: CPT | Mod: 25,S$GLB,, | Performed by: SURGERY

## 2019-04-19 RX ORDER — AZELASTINE 1 MG/ML
2 SPRAY, METERED NASAL 2 TIMES DAILY
Qty: 30 ML | Refills: 0 | Status: SHIPPED | OUTPATIENT
Start: 2019-04-19 | End: 2019-05-08 | Stop reason: SDUPTHER

## 2019-04-19 RX ORDER — PREDNISONE 20 MG/1
40 TABLET ORAL DAILY
Qty: 10 TABLET | Refills: 0 | Status: SHIPPED | OUTPATIENT
Start: 2019-04-20 | End: 2019-04-25

## 2019-04-19 RX ORDER — BETAMETHASONE SODIUM PHOSPHATE AND BETAMETHASONE ACETATE 3; 3 MG/ML; MG/ML
9 INJECTION, SUSPENSION INTRA-ARTICULAR; INTRALESIONAL; INTRAMUSCULAR; SOFT TISSUE ONCE
Status: COMPLETED | OUTPATIENT
Start: 2019-04-19 | End: 2019-04-19

## 2019-04-19 RX ADMIN — BETAMETHASONE SODIUM PHOSPHATE AND BETAMETHASONE ACETATE 9 MG: 3; 3 INJECTION, SUSPENSION INTRA-ARTICULAR; INTRALESIONAL; INTRAMUSCULAR; SOFT TISSUE at 04:04

## 2019-04-19 NOTE — PATIENT INSTRUCTIONS
Allergic Rhinitis  Allergic rhinitis is an allergic reaction that affects the nose, and often the eyes. Its often known as nasal allergies. Nasal allergies are often due to things in the environment that are breathed in. Depending what you are sensitive to, nasal allergies may occur only during certain seasons. Or they may occur year round. Common indoor allergens include house dust mites, mold, cockroaches, and pet dander. Outdoor allergens include pollen from trees, grasses, and weeds.   Symptoms include a drippy, stuffy, and itchy nose. They also include sneezing and red and itchy eyes. You may feel tired more often. Severe allergies may also affect your breathing and trigger a condition called asthma.   Tests can be done to see what allergens are affecting you. You may be referred to an allergy specialist for testing and further evaluation.  Home care  Your healthcare provider may prescribe medicines to help relieve allergy symptoms. These may include oral medicines, nasal sprays, or eye drops.  Ask your provider for advice on how to avoid substances that you are allergic to. Below are a few tips for each type of allergen.  Pet dander:  · Do not have pets with fur and feathers.  · If you can't avoid having a pet, keep it out of your bedroom and off upholstered furniture.  Pollen:  · When pollen counts are high, keep windows of your car and home closed. If possible, use an air conditioner instead.  · Wear a filter mask when mowing or doing yard work.  House dust mites:  · Wash bedding every week in warm water and detergent and dry on a hot setting.  · Cover the mattress, box spring, and pillows with allergy covers.   · If possible, sleep in a room with no carpet, curtains, or upholstered furniture.  Cockroaches:  · Store food in sealed containers.  · Remove garbage from the home promptly.  · Fix water leaks  Mold:  · Keep humidity low by using a dehumidifier or air conditioner. Keep the dehumidifier and air  conditioner clean and free of mold.  · Clean moldy areas with bleach and water.  In general:  · Vacuum once or twice a week. If possible, use a vacuum with a high-efficiency particulate air (HEPA) filter.  · Do not smoke. Avoid cigarette smoke. Cigarette smoke is an irritant that can make symptoms worse.  Follow-up care  Follow up as advised by the healthcare provider or our staff. If you were referred to an allergy specialist, make this appointment promptly.  When to seek medical advice  Call your healthcare provider right away if the following occur:  · Coughing or wheezing  · Fever greater than 100.4°F (38°C)  · Hives (raised red bumps)  · Continuing symptoms, new symptoms, or worsening symptoms  Call 911 right away if you have:  · Trouble breathing  · Severe swelling of the face or severe itching of the eyes or mouth  Date Last Reviewed: 3/1/2017  © 2309-9086 [email or web address]. [address] [place] [address], [place] [address]. All rights reserved. This information is not intended as a substitute for professional medical care. Always follow your healthcare professional's instructions.

## 2019-04-19 NOTE — PROGRESS NOTES
"Subjective:       Patient ID: Janusz Montgomery Jr. is a 74 y.o. male.    Vitals:  height is 6' 1" (1.854 m) and weight is 164.7 kg (363 lb) (abnormal). His temperature is 97.1 °F (36.2 °C). His blood pressure is 140/68 (abnormal) and his pulse is 74. His respiration is 16 and oxygen saturation is 97%.     Chief Complaint: URI    URI    This is a new problem. The current episode started yesterday. The problem has been unchanged. There has been no fever. Associated symptoms include congestion, coughing and ear pain. Pertinent negatives include no nausea, rash, sinus pain, sore throat, vomiting or wheezing. He has tried nothing for the symptoms.       Constitution: Negative for chills, sweating, fatigue and fever.   HENT: Positive for ear pain, congestion, postnasal drip and sinus pressure. Negative for sinus pain, sore throat and voice change.    Neck: Negative for painful lymph nodes.   Eyes: Negative for eye redness.   Respiratory: Positive for cough and sputum production. Negative for chest tightness, bloody sputum, COPD, shortness of breath, stridor, wheezing and asthma.    Gastrointestinal: Negative for nausea and vomiting.   Musculoskeletal: Negative for muscle ache.   Skin: Negative for rash.   Allergic/Immunologic: Negative for seasonal allergies and asthma.   Hematologic/Lymphatic: Negative for swollen lymph nodes.       Objective:      Physical Exam   Constitutional: He is oriented to person, place, and time. He appears well-developed and well-nourished. He is cooperative.  Non-toxic appearance. He does not appear ill. No distress.   HENT:   Head: Normocephalic and atraumatic.   Right Ear: Hearing, external ear and ear canal normal. A middle ear effusion (clear) is present.   Left Ear: Hearing, external ear and ear canal normal. A middle ear effusion (  clear) is present.   Nose: Mucosal edema and rhinorrhea present. No nasal deformity. No epistaxis. Right sinus exhibits no maxillary sinus tenderness and no " frontal sinus tenderness. Left sinus exhibits no maxillary sinus tenderness and no frontal sinus tenderness.   Mouth/Throat: Uvula is midline and mucous membranes are normal. No trismus in the jaw. Normal dentition. No uvula swelling. Posterior oropharyngeal edema and posterior oropharyngeal erythema present.   Eyes: Conjunctivae and lids are normal. No scleral icterus.   Sclera clear bilat   Neck: Trachea normal, full passive range of motion without pain and phonation normal. Neck supple.   Cardiovascular: Normal rate, regular rhythm, normal heart sounds, intact distal pulses and normal pulses.   Pulmonary/Chest: Effort normal and breath sounds normal. No respiratory distress.   Abdominal: Soft. Normal appearance and bowel sounds are normal. He exhibits no distension. There is no tenderness.   Musculoskeletal: Normal range of motion. He exhibits no edema or deformity.   Neurological: He is alert and oriented to person, place, and time. He exhibits normal muscle tone. Coordination normal.   Skin: Skin is warm, dry and intact. He is not diaphoretic. No pallor.   Psychiatric: He has a normal mood and affect. His speech is normal and behavior is normal. Judgment and thought content normal. Cognition and memory are normal.   Nursing note and vitals reviewed.      Assessment:       1. Acute seasonal allergic rhinitis    2. Sore throat        Plan:         Acute seasonal allergic rhinitis  -     betamethasone acetate-betamethasone sodium phosphate injection 9 mg  -     predniSONE (DELTASONE) 20 MG tablet; Take 2 tablets (40 mg total) by mouth once daily. for 5 days  Dispense: 10 tablet; Refill: 0  -     azelastine (ASTELIN) 137 mcg (0.1 %) nasal spray; 2 sprays (274 mcg total) by Nasal route 2 (two) times daily.  Dispense: 30 mL; Refill: 0    Sore throat      Patient Instructions     Allergic Rhinitis  Allergic rhinitis is an allergic reaction that affects the nose, and often the eyes. Its often known as nasal allergies.  Nasal allergies are often due to things in the environment that are breathed in. Depending what you are sensitive to, nasal allergies may occur only during certain seasons. Or they may occur year round. Common indoor allergens include house dust mites, mold, cockroaches, and pet dander. Outdoor allergens include pollen from trees, grasses, and weeds.   Symptoms include a drippy, stuffy, and itchy nose. They also include sneezing and red and itchy eyes. You may feel tired more often. Severe allergies may also affect your breathing and trigger a condition called asthma.   Tests can be done to see what allergens are affecting you. You may be referred to an allergy specialist for testing and further evaluation.  Home care  Your healthcare provider may prescribe medicines to help relieve allergy symptoms. These may include oral medicines, nasal sprays, or eye drops.  Ask your provider for advice on how to avoid substances that you are allergic to. Below are a few tips for each type of allergen.  Pet dander:  · Do not have pets with fur and feathers.  · If you can't avoid having a pet, keep it out of your bedroom and off upholstered furniture.  Pollen:  · When pollen counts are high, keep windows of your car and home closed. If possible, use an air conditioner instead.  · Wear a filter mask when mowing or doing yard work.  House dust mites:  · Wash bedding every week in warm water and detergent and dry on a hot setting.  · Cover the mattress, box spring, and pillows with allergy covers.   · If possible, sleep in a room with no carpet, curtains, or upholstered furniture.  Cockroaches:  · Store food in sealed containers.  · Remove garbage from the home promptly.  · Fix water leaks  Mold:  · Keep humidity low by using a dehumidifier or air conditioner. Keep the dehumidifier and air conditioner clean and free of mold.  · Clean moldy areas with bleach and water.  In general:  · Vacuum once or twice a week. If possible, use a  vacuum with a high-efficiency particulate air (HEPA) filter.  · Do not smoke. Avoid cigarette smoke. Cigarette smoke is an irritant that can make symptoms worse.  Follow-up care  Follow up as advised by the healthcare provider or our staff. If you were referred to an allergy specialist, make this appointment promptly.  When to seek medical advice  Call your healthcare provider right away if the following occur:  · Coughing or wheezing  · Fever greater than 100.4°F (38°C)  · Hives (raised red bumps)  · Continuing symptoms, new symptoms, or worsening symptoms  Call 911 right away if you have:  · Trouble breathing  · Severe swelling of the face or severe itching of the eyes or mouth  Date Last Reviewed: 3/1/2017  © 6836-7792 [employer]. [address] [place] [address], [place] [address]. All rights reserved. This information is not intended as a substitute for professional medical care. Always follow your healthcare professional's instructions.

## 2019-04-25 ENCOUNTER — CLINICAL SUPPORT (OUTPATIENT)
Dept: CARDIOLOGY | Facility: HOSPITAL | Age: 74
End: 2019-04-25
Attending: INTERNAL MEDICINE
Payer: MEDICARE

## 2019-04-25 DIAGNOSIS — Z95.818 STATUS POST PLACEMENT OF IMPLANTABLE LOOP RECORDER: ICD-10-CM

## 2019-04-25 DIAGNOSIS — I48.91 ATRIAL FIBRILLATION: ICD-10-CM

## 2019-04-25 PROCEDURE — 93299 CARDIAC DEVICE CHECK CHECK - REMOTE: CPT | Mod: HCNC

## 2019-04-29 NOTE — PROGRESS NOTES
Last 5 Patient Entered Readings                                      Current 30 Day Average: 139/72     Recent Readings 4/13/2019 4/3/2019 4/1/2019 3/26/2019 3/12/2019    SBP (mmHg) 141 150 126 139 134    DBP (mmHg) 63 87 65 76 66    Pulse 79 70 70 70 86        Messaging patient regarding lack of readings.

## 2019-04-30 ENCOUNTER — DOCUMENTATION ONLY (OUTPATIENT)
Dept: BARIATRICS | Facility: CLINIC | Age: 74
End: 2019-04-30

## 2019-04-30 NOTE — PROGRESS NOTES
Bariatric Surgery Online Course Form Submission  Someone has submitted a Bariatric Surgery Online Course Form Submission on this page.  Date: 04- 22:01:48  Patient's Name: Janusz Montgomery  YOB: 1945 Email: [email or web address]  Phone: [phone]   Patient Address: [address] [place] [id number]  Preferred Surgical Location: [facility]   I certify that I am 18 years of age or older: Yes   Confirmation Code: [id number]  Verification of Bariatric Seminar: yes  Insurance Information  Insurance or Self Pay? Insurance - Fill out fields below  Insurance Company Name: [employer]   Type of Coverage/Coverage Plan (i.e. PPO, HMO): HMO [id number]   Group Name:   Subscriber Name: Janusz Montgomery Jr   Member Name (patient's name): Janusz Montgomery Jr   Member ID/Policy #:[id number]   Plan Effective Date: 01/01/2019  Card Issuance date: 11/02/2018

## 2019-05-01 ENCOUNTER — HOSPITAL ENCOUNTER (OUTPATIENT)
Dept: RADIOLOGY | Facility: HOSPITAL | Age: 74
Discharge: HOME | End: 2019-05-01
Attending: INTERNAL MEDICINE
Payer: MEDICARE

## 2019-05-01 DIAGNOSIS — R06.02 SOB (SHORTNESS OF BREATH): ICD-10-CM

## 2019-05-01 PROCEDURE — 71046 X-RAY EXAM CHEST 2 VIEWS: CPT | Mod: 26,HCNC,, | Performed by: RADIOLOGY

## 2019-05-01 PROCEDURE — 71046 X-RAY EXAM CHEST 2 VIEWS: CPT | Mod: TC,HCNC

## 2019-05-01 PROCEDURE — 71046 XR CHEST PA AND LATERAL: ICD-10-PCS | Mod: 26,HCNC,, | Performed by: RADIOLOGY

## 2019-05-07 NOTE — PROGRESS NOTES
Last 5 Patient Entered Readings                                      Current 30 Day Average: 141/63     Recent Readings 4/13/2019 4/3/2019 4/1/2019 3/26/2019 3/12/2019    SBP (mmHg) 141 150 126 139 134    DBP (mmHg) 63 87 65 76 66    Pulse 79 70 70 70 86          Health  addressing lack of BP readings

## 2019-05-08 ENCOUNTER — OFFICE VISIT (OUTPATIENT)
Dept: BARIATRICS | Facility: CLINIC | Age: 74
End: 2019-05-08
Payer: MEDICARE

## 2019-05-08 VITALS
HEIGHT: 73 IN | HEART RATE: 87 BPM | BODY MASS INDEX: 41.75 KG/M2 | DIASTOLIC BLOOD PRESSURE: 52 MMHG | WEIGHT: 315 LBS | SYSTOLIC BLOOD PRESSURE: 108 MMHG

## 2019-05-08 DIAGNOSIS — E55.9 VITAMIN D DEFICIENCY DISEASE: ICD-10-CM

## 2019-05-08 DIAGNOSIS — E66.01 MORBID OBESITY WITH BMI OF 40.0-44.9, ADULT: ICD-10-CM

## 2019-05-08 DIAGNOSIS — I10 ESSENTIAL HYPERTENSION: Primary | ICD-10-CM

## 2019-05-08 DIAGNOSIS — I48.0 PAROXYSMAL ATRIAL FIBRILLATION: ICD-10-CM

## 2019-05-08 DIAGNOSIS — G47.33 OBSTRUCTIVE SLEEP APNEA ON CPAP: ICD-10-CM

## 2019-05-08 DIAGNOSIS — C61 PROSTATE CANCER: ICD-10-CM

## 2019-05-08 DIAGNOSIS — K21.9 GASTROESOPHAGEAL REFLUX DISEASE, ESOPHAGITIS PRESENCE NOT SPECIFIED: ICD-10-CM

## 2019-05-08 DIAGNOSIS — E66.01 MORBID OBESITY WITH BMI OF 40.0-44.9, ADULT: Primary | ICD-10-CM

## 2019-05-08 DIAGNOSIS — I25.10 CORONARY ARTERY DISEASE INVOLVING NATIVE CORONARY ARTERY OF NATIVE HEART WITHOUT ANGINA PECTORIS: ICD-10-CM

## 2019-05-08 DIAGNOSIS — I10 ESSENTIAL HYPERTENSION: ICD-10-CM

## 2019-05-08 DIAGNOSIS — E78.5 HYPERLIPIDEMIA, UNSPECIFIED HYPERLIPIDEMIA TYPE: ICD-10-CM

## 2019-05-08 PROCEDURE — 1101F PT FALLS ASSESS-DOCD LE1/YR: CPT | Mod: HCNC,CPTII,S$GLB, | Performed by: SURGERY

## 2019-05-08 PROCEDURE — 99214 PR OFFICE/OUTPT VISIT, EST, LEVL IV, 30-39 MIN: ICD-10-PCS | Mod: HCNC,S$GLB,, | Performed by: SURGERY

## 2019-05-08 PROCEDURE — 1101F PR PT FALLS ASSESS DOC 0-1 FALLS W/OUT INJ PAST YR: ICD-10-PCS | Mod: HCNC,CPTII,S$GLB, | Performed by: SURGERY

## 2019-05-08 PROCEDURE — 99214 OFFICE O/P EST MOD 30 MIN: CPT | Mod: HCNC,S$GLB,, | Performed by: SURGERY

## 2019-05-08 PROCEDURE — 3078F DIAST BP <80 MM HG: CPT | Mod: HCNC,CPTII,S$GLB, | Performed by: SURGERY

## 2019-05-08 PROCEDURE — 3078F PR MOST RECENT DIASTOLIC BLOOD PRESSURE < 80 MM HG: ICD-10-PCS | Mod: HCNC,CPTII,S$GLB, | Performed by: SURGERY

## 2019-05-08 PROCEDURE — 3074F PR MOST RECENT SYSTOLIC BLOOD PRESSURE < 130 MM HG: ICD-10-PCS | Mod: HCNC,CPTII,S$GLB, | Performed by: SURGERY

## 2019-05-08 PROCEDURE — 99999 PR PBB SHADOW E&M-EST. PATIENT-LVL III: ICD-10-PCS | Mod: PBBFAC,HCNC,, | Performed by: SURGERY

## 2019-05-08 PROCEDURE — 3074F SYST BP LT 130 MM HG: CPT | Mod: HCNC,CPTII,S$GLB, | Performed by: SURGERY

## 2019-05-08 PROCEDURE — 99999 PR PBB SHADOW E&M-EST. PATIENT-LVL III: CPT | Mod: PBBFAC,HCNC,, | Performed by: SURGERY

## 2019-05-08 RX ORDER — ACETAMINOPHEN 500 MG
10 TABLET ORAL NIGHTLY
COMMUNITY

## 2019-05-08 NOTE — PROGRESS NOTES
BARIATRIC NEW PATIENT EVALUATION      HISTORY OF PRESENT ILLNESS:  Janusz Montgomery Jr. is a 74 y.o.-year-old male presenting for morbid obesity with a Body mass index is 46.25 kg/m². and inability to lose weight. The patient has tried multiple attempts at weight loss including the prick in diet where he lost 30-40 lb but gained his back.  He also has tried the GB current diet, Sugar busters, and watching calorie intake.  None of these have allowed for long-term weight loss success.  He states he struggled with his weight since he is  approximately 35 years ago and has slowly gained weight over that time period.  He recently has noticed increased pain in his joints especially his ankles with the weight and is interested in weight loss to help take pressure off the joint pains.  He has also noticed increased shortness of breath.  Presents today interested in sleeve gastrectomy.    HPI      PAST MEDICAL HISTORY:  Past Medical History:   Diagnosis Date    NAT (acute kidney injury) 3/19/2017    ALLERGIC RHINITIS     Anemia     Anxiety     Basal cell carcinoma 10/19/2018    forehead and right medial shoulder    Chronic rhinitis 5/3/2013    Chronic rhinitis 5/3/2013    Coronary artery disease involving native coronary artery of native heart without angina pectoris s/p RCA stent     Cortical cataract of both eyes 3/18/2016    Delayed sleep phase syndrome 3/13/2019    Depression     Erectile dysfunction 3/24/2014    Erectile dysfunction 3/24/2014    Essential hypertension     GERD (gastroesophageal reflux disease) 7/25/2012    Gout, arthritis     Grade III open fracture of left tibia and fibula s/p ex-fix on 7/1/16 and ORIF of left tibia on 7/15 7/6/2016    H/O: iritis     Helicobacter pylori (H. pylori) infection     Treated    Herpes simplex keratoconjunctivitis 9/30/2015    - on acyclovir - followed by opthalmology, Dr. Uribe     Herpes simplex keratoconjunctivitis 9/30/2015    - on acyclovir  "- followed by opthalmology, Dr. Uribe     Hyperkalemia 2/28/2017    Hyperlipidemia     Hypogonadism male     Hypogonadism male     Mixed anxiety and depressive disorder     Morbid obesity     Obstructive sleep apnea on CPAP     CPAP    Osteoarthritis of left knee 7/25/2012    Paroxysmal atrial fibrillation 7/6/2016    Primary osteoarthritis of left knee 7/25/2012    Prominent aorta 1/25/2016    "RESULTS: THE HEART IS MILDLY ENLARGED WITH A SLIGHTLY PROMINENT AORTA" - Xray Chest PA & Lateral 12-     Prostate cancer 2/15/2016    - followed by urology, Dr. Young     Prostate cancer 2/15/2016    - followed by urology, Dr. 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    chest and forehead    Squamous cell cancer of skin of nose     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 7/6/2016    Type III open fracture of left tibia and fibula with routine healing 7/6/2016    Vitamin D deficiency disease     Vitreous detachment 3/18/2016        PAST SURGICAL HISTORY:  Past Surgical History:   Procedure Laterality Date    APPLICATION-EXTERNAL FIXATION DEVICE, ankle Left 12/28/2016    Performed by [facility] at [facility] 2ND [facility]LR    Cardiac stenting x2      CATARACT EXTRACTION W/  INTRAOCULAR LENS IMPLANT Right 3/29/2016    Dr. Conteh    CATARACT EXTRACTION W/  INTRAOCULAR LENS IMPLANT Left 4/12/2016        DEBRIDEMENT-FOOT Left 4/13/2017    [facility] at [facility]    EXTERNAL FIXATION TIBIAL FRACTURE Left 07/01/2016    FULL THICKNESS SKIN GRAFT from left shoulder Left 12/8/2015    [facility] Colt [facility]osa [facility] at [facility]ND [facility]    INSERTION, RADIOACTIVE SEED, PROSTATE N/A 8/8/2018    Performed by Bipin Thompson [facility] at [facility]ST [facility]LR    INSERTION-INTRAOCULAR LENS (IOL) Left 4/12/2016    " [facility] Pool [facility] at [facility]ST [facility]LR    INSERTION-INTRAOCULAR LENS (IOL) Right 3/29/2016    [facility] Pool [facility]h [facility] at [facility]LR    MAPPING-ULTRASOUND  6/13/2018    [facility] at [facility]    OPEN REDUCTION INTERNAL DIDPHLUX-YEHYRD-LLOFF/FIBULA Left 4/13/2017    [facility] at [facility]LR    OPEN REDUCTION INTERNAL FIXATION-TIBIA Left 7/15/2016    [facility] at [facility]    ORIF TIBIA FRACTURE Left 07/15/2016    PHACOEMULSIFICATION-ASPIRATION-CATARACT Left 4/12/2016    [facility] by Pool [facility]h [facility] at [facility]ST [facility]LR    PHACOEMULSIFICATION-ASPIRATION-CATARACT Right 3/29/2016    Performed by Pool [facility]h [facility] at [facility]ST [facility]    REDUCTION-CLOSED-TIBIA Left 12/28/2016    [facility] at [facility]ND [facility]    REMOVAL OF EXTERNAL FIXATION DEVICE Left 7/15/2016    [facility] at [facility]    REMOVAL OF EXTERNAL FIXATION DEVICE, LEFT ANKLE C ARM Left 4/13/2017    [facility] at [facility]LR    REMOVAL-HARDWARE-LEG Left 4/13/2017    [facility] at [facility]    REPAIR-MOHS DEFECT Basal Cell Left Lateral Eyebrow Left 12/8/2015    [facility]t [facility] at [facility] 2ND [facility]LR    Squamous cell cancer removal x3 with Mohs surgery      TONSILLECTOMY      TOTAL KNEE ARTHROPLASTY  10/2012    trus/bx         FAMILY HISTORY:  Family History   Problem Relation Age of Onset    Skin cancer Father     Lung cancer Father     Cancer Father         smoker,     Alzheimer's disease Mother     Hypertension Mother     Cancer Sister         colon, lung cancer     No Known Problems Sister     Cancer Brother         skin cancer, polypectomy     Peripheral vascular disease Unknown     No Known Problems Maternal Aunt     No Known Problems Maternal Uncle     No Known  Problems Paternal Aunt     No Known Problems Paternal Uncle     No Known Problems Maternal Grandmother     No Known Problems Maternal Grandfather     No Known Problems Paternal Grandmother     No Known Problems Paternal Grandfather     Melanoma Neg Hx     Psoriasis Neg Hx     Lupus Neg Hx     Eczema Neg Hx     Amblyopia Neg Hx     Blindness Neg Hx     Cataracts Neg Hx     Diabetes Neg Hx     Glaucoma Neg Hx     Macular degeneration Neg Hx     Retinal detachment Neg Hx     Strabismus Neg Hx     Stroke Neg Hx     Thyroid disease Neg Hx     Acne Neg Hx         SOCIAL HISTORY:   reports that he has never smoked. He has never used smokeless tobacco. He reports that he drinks alcohol. He reports that he does not use drugs.     MEDICATIONS:  Medications have been reviewed.    ALLERGIES:  Allergies have been reviewed.    ROS:  Review of Systems   Constitutional: Negative for activity change, appetite change, chills, diaphoresis, fatigue and fever.   HENT: Negative for congestion, sinus pressure, sneezing, sore throat and tinnitus.    Eyes: Negative for pain, discharge, redness, itching and visual disturbance.   Respiratory: Negative for apnea, cough, choking, chest tightness and shortness of breath.    Cardiovascular: Negative for chest pain, palpitations and leg swelling.   Gastrointestinal: Negative for abdominal distention, abdominal pain, blood in stool, constipation, diarrhea and nausea.   Musculoskeletal: Positive for arthralgias. Negative for gait problem.   Neurological: Negative for dizziness, facial asymmetry, light-headedness and headaches.   Psychiatric/Behavioral: Negative for agitation, behavioral problems and confusion.       PE:  Physical Exam   Constitutional: He is oriented to person, place, and time. He appears well-developed and well-nourished. No distress.   HENT:   Head: Normocephalic and atraumatic.   Right Ear: External ear normal.   Left Ear: External ear normal.   Eyes: EOM are  normal. Right eye exhibits no discharge. Left eye exhibits no discharge. No scleral icterus.   Neck: Normal range of motion.   Cardiovascular: Normal rate and regular rhythm.   Pulmonary/Chest: Effort normal. No respiratory distress.   Abdominal: Soft. Bowel sounds are normal. He exhibits no distension and no mass. There is no tenderness. There is no rebound and no guarding. No hernia.   Musculoskeletal: Normal range of motion.   Neurological: He is alert and oriented to person, place, and time.   Skin: Skin is warm and dry. No rash noted. He is not diaphoretic. No erythema. No pallor.   Psychiatric: He has a normal mood and affect. His behavior is normal. Judgment and thought content normal.       DIAGNOSIS:  1. Morbid Obesity with a Body mass index is 46.25 kg/m². and inability to lose weight.    PLAN:  The patient is a potential candidate for Bariatric Surgery. He is interested in sleeve gastrectomy. The surgery and post-op care was discussed in detail with the patient. All questions were answered.    He understands that bariatric surgery is a tool to aid in weight loss and that he needs to be committed to the diet and exercise post-operatively for successful weight loss. Discussed with patient that bariatric surgery is not the easy way out and that it will take plenty of dedication on the patient's part to be successful. Also discussed the possibility of weight regain if the patient strays from the diet guidelines or exercise requirements. Patient verbalized understanding and wishes to proceed with the work-up.    ORDERS:  1. Chest X-Ray and EKG  2. Psychological Consult, Bariatric Dietician Consult, Cardiac Clearance and Eliquis Recs.  3. Bariatric Labs: BMP, CBC, Folate Serum, H. pylori, HgA1C, Hepatic Panel/LFT, Iron & TIBC, Lipid Profile, Magnesium, Phosphate, T3, T4, TSH, Free T4, Vitamin B12, and Vitamin B1.    Referring Physician: Self, Aaareferral  RTC: As scheduled.

## 2019-05-09 ENCOUNTER — DOCUMENTATION ONLY (OUTPATIENT)
Dept: BARIATRICS | Facility: CLINIC | Age: 74
End: 2019-05-09

## 2019-05-13 DIAGNOSIS — E78.5 HYPERLIPIDEMIA, UNSPECIFIED HYPERLIPIDEMIA TYPE: ICD-10-CM

## 2019-05-13 RX ORDER — ACYCLOVIR 800 MG/1
800 TABLET ORAL 2 TIMES DAILY
Qty: 180 TABLET | Refills: 6 | Status: SHIPPED | OUTPATIENT
Start: 2019-05-13 | End: 2020-06-11 | Stop reason: SDUPTHER

## 2019-05-13 RX ORDER — ATORVASTATIN CALCIUM 40 MG/1
TABLET, FILM COATED ORAL
Qty: 90 TABLET | Refills: 0 | Status: SHIPPED | OUTPATIENT
Start: 2019-05-13 | End: 2019-05-13 | Stop reason: SDUPTHER

## 2019-05-13 RX ORDER — ATORVASTATIN CALCIUM 40 MG/1
40 TABLET, FILM COATED ORAL DAILY
Qty: 120 TABLET | Refills: 0 | Status: SHIPPED | OUTPATIENT
Start: 2019-05-13 | End: 2019-09-24 | Stop reason: SDUPTHER

## 2019-05-13 NOTE — PROGRESS NOTES
Last 5 Patient Entered Readings                                      Current 30 Day Average: 141/63     Recent Readings 4/13/2019 4/3/2019 4/1/2019 3/26/2019 3/12/2019    SBP (mmHg) 141 150 126 139 134    DBP (mmHg) 63 87 65 76 66    Pulse 79 70 70 70 86        Digital Medicine: Health  Follow Up    Left voicemail to follow up with Mr. Janusz Montgomery Jr..  Current BP average 141/63 mmHg is not at goal, <130/80 mmHg.  No recent BP readings. Will follow up in 2 weeks.  Consider start of noncompliance workflow.

## 2019-05-14 ENCOUNTER — HOSPITAL ENCOUNTER (OUTPATIENT)
Dept: PULMONOLOGY | Facility: CLINIC | Age: 74
Discharge: HOME | End: 2019-05-14
Payer: MEDICARE

## 2019-05-14 DIAGNOSIS — R06.02 SOB (SHORTNESS OF BREATH): Primary | ICD-10-CM

## 2019-05-14 DIAGNOSIS — R06.02 SOB (SHORTNESS OF BREATH): ICD-10-CM

## 2019-05-14 LAB
PRE FEV1 FVC: 79
PRE FEV1: 2.13
PRE FVC: 2.7
PREDICTED FEV1 FVC: 77
PREDICTED FEV1: 3.73
PREDICTED FVC: 4.71

## 2019-05-14 PROCEDURE — 94010 BREATHING CAPACITY TEST: CPT | Mod: HCNC,S$GLB,, | Performed by: INTERNAL MEDICINE

## 2019-05-14 PROCEDURE — 94010 BREATHING CAPACITY TEST: ICD-10-PCS | Mod: HCNC,S$GLB,, | Performed by: INTERNAL MEDICINE

## 2019-05-23 ENCOUNTER — TELEPHONE (OUTPATIENT)
Dept: FAMILY MEDICINE | Facility: CLINIC | Age: 74
End: 2019-05-23

## 2019-05-23 NOTE — TELEPHONE ENCOUNTER
Patient already had colonoscopy order placed 2/2019 - please call main campus endo to see status of scheduling.  Patient already seeing pulmonary on 6/21 per chart.

## 2019-05-23 NOTE — TELEPHONE ENCOUNTER
Spoke with the pt and he is requesting order for colonoscopy.  Pt states he is having problems breathing.  Patient has been experiencing shortness of breath for a couple of months.  I offered the pt an appt to be seen here and he refused.  Pt has an appt with respiratory the end of next month.  Pt states he will only have 3 test done and will not see a doctor.  He would like to see someone with respiratory before the end of next month.  Please advise.  Pt is requesting to have his colonoscopy done at [facility].

## 2019-05-23 NOTE — TELEPHONE ENCOUNTER
"----- Message from Virginia Cayetano sent at 5/23/2019 12:03 PM CDT -----  Contact: PATIENT  HIMSELF  Name of Who is Calling:   PATIENT  HIMSELF    What is the request in detail:   Patient called and said, "he's returning your call and would like you to please call again."  Patient is also asking to schedule his colonoscopy.  Please give a call back at your earliest convenience.   THANKS!           Can the clinic reply by [facility]: NO    Number to Call Back:  PATIENT'S  PH# [phone] (H)                                  "

## 2019-05-24 NOTE — TELEPHONE ENCOUNTER
Patient return call and was informed of charted information. Patient was given number to call to schedule appointment for Colonoscopy.

## 2019-05-27 ENCOUNTER — CLINICAL SUPPORT (OUTPATIENT)
Dept: CARDIOLOGY | Facility: HOSPITAL | Age: 74
End: 2019-05-27
Attending: INTERNAL MEDICINE
Payer: MEDICARE

## 2019-05-27 DIAGNOSIS — Z95.818 STATUS POST PLACEMENT OF IMPLANTABLE LOOP RECORDER: ICD-10-CM

## 2019-05-27 DIAGNOSIS — I48.91 ATRIAL FIBRILLATION: ICD-10-CM

## 2019-05-27 PROCEDURE — 93299 CARDIAC DEVICE CHECK CHECK - REMOTE: CPT | Mod: HCNC

## 2019-05-28 NOTE — PROGRESS NOTES
Last 5 Patient Entered Readings                                      Current 30 Day Average:      Recent Readings 4/13/2019 4/3/2019 4/1/2019 3/26/2019 3/12/2019    SBP (mmHg) 141 150 126 139 134    DBP (mmHg) 63 87 65 76 66    Pulse 79 70 70 70 86        Digital Medicine: Health  Follow Up    Left voicemail to follow up with Mr. Janusz Montgomery Jr..  No recent BP readings. Plan to start noncompliance workflow.   Await new readings and continue to follow up.

## 2019-05-29 DIAGNOSIS — F33.0 MAJOR DEPRESSIVE DISORDER, RECURRENT EPISODE, MILD: ICD-10-CM

## 2019-05-29 RX ORDER — VENLAFAXINE 75 MG/1
TABLET ORAL
Qty: 360 TABLET | Refills: 0 | Status: SHIPPED | OUTPATIENT
Start: 2019-05-29 | End: 2019-09-23 | Stop reason: SDUPTHER

## 2019-05-30 ENCOUNTER — TELEPHONE (OUTPATIENT)
Dept: ELECTROPHYSIOLOGY | Facility: CLINIC | Age: 74
End: 2019-05-30

## 2019-05-30 ENCOUNTER — PATIENT MESSAGE (OUTPATIENT)
Dept: OPTOMETRY | Facility: CLINIC | Age: 74
End: 2019-05-30

## 2019-05-30 NOTE — TELEPHONE ENCOUNTER
Attempting to reach Mr. Boudreaux.  No answer to mobile or home numbers.  Third number listed is step-daughter, who reports he is not with her at this time.     Message left on mobile line asking him to return call to our office.

## 2019-05-30 NOTE — TELEPHONE ENCOUNTER
----- Message from Marizol JEB Rosales sent at 5/30/2019  3:09 PM CDT -----  Patient stated he has been put on a new medication by Dr. Palacio and has been having head aches. He would like for Dr. Palacio or his nurse to return his call. Thanks.

## 2019-05-31 ENCOUNTER — PATIENT MESSAGE (OUTPATIENT)
Dept: PULMONOLOGY | Facility: CLINIC | Age: 74
End: 2019-05-31

## 2019-05-31 ENCOUNTER — PATIENT MESSAGE (OUTPATIENT)
Dept: FAMILY MEDICINE | Facility: CLINIC | Age: 74
End: 2019-05-31

## 2019-05-31 ENCOUNTER — TELEPHONE (OUTPATIENT)
Dept: BARIATRICS | Facility: CLINIC | Age: 74
End: 2019-05-31

## 2019-05-31 NOTE — TELEPHONE ENCOUNTER
5/31/2019: Telephone call:    Discuss scheduling RD visit as part of his bariatric workup. Pt stated he does not know his schedule for this month yet, would either like a call back next week or RD gave him number to call back once he has finalized his schedule.  Pt verbalized understanding.    6/5/2019:   Left message with contact information for pt to call back regarding scheduling RD visit.

## 2019-06-03 ENCOUNTER — TELEPHONE (OUTPATIENT)
Dept: ELECTROPHYSIOLOGY | Facility: CLINIC | Age: 74
End: 2019-06-03

## 2019-06-03 NOTE — TELEPHONE ENCOUNTER
Attempting again to check on Mr. Boudreaux to see how he is doing.  No answer, message left asking him to return call to office.

## 2019-06-04 ENCOUNTER — OFFICE VISIT (OUTPATIENT)
Dept: URGENT CARE | Facility: CLINIC | Age: 74
End: 2019-06-04
Payer: MEDICARE

## 2019-06-04 VITALS
SYSTOLIC BLOOD PRESSURE: 148 MMHG | RESPIRATION RATE: 18 BRPM | OXYGEN SATURATION: 97 % | HEART RATE: 111 BPM | HEIGHT: 73 IN | TEMPERATURE: 100 F | DIASTOLIC BLOOD PRESSURE: 71 MMHG | BODY MASS INDEX: 41.75 KG/M2 | WEIGHT: 315 LBS

## 2019-06-04 DIAGNOSIS — M54.2 NECK PAIN: Primary | ICD-10-CM

## 2019-06-04 DIAGNOSIS — G44.209 TENSION HEADACHE: ICD-10-CM

## 2019-06-04 DIAGNOSIS — B96.89 BACTERIAL CONJUNCTIVITIS OF BOTH EYES: ICD-10-CM

## 2019-06-04 DIAGNOSIS — H66.001 ACUTE SUPPURATIVE OTITIS MEDIA OF RIGHT EAR WITHOUT SPONTANEOUS RUPTURE OF TYMPANIC MEMBRANE, RECURRENCE NOT SPECIFIED: ICD-10-CM

## 2019-06-04 DIAGNOSIS — H10.9 BACTERIAL CONJUNCTIVITIS OF BOTH EYES: ICD-10-CM

## 2019-06-04 PROCEDURE — 3077F PR MOST RECENT SYSTOLIC BLOOD PRESSURE >= 140 MM HG: ICD-10-PCS | Mod: CPTII,S$GLB,, | Performed by: PHYSICIAN ASSISTANT

## 2019-06-04 PROCEDURE — 96372 THER/PROPH/DIAG INJ SC/IM: CPT | Mod: S$GLB,,, | Performed by: EMERGENCY MEDICINE

## 2019-06-04 PROCEDURE — 99214 OFFICE O/P EST MOD 30 MIN: CPT | Mod: 25,S$GLB,, | Performed by: PHYSICIAN ASSISTANT

## 2019-06-04 PROCEDURE — 1101F PT FALLS ASSESS-DOCD LE1/YR: CPT | Mod: CPTII,S$GLB,, | Performed by: PHYSICIAN ASSISTANT

## 2019-06-04 PROCEDURE — 72040 XR CERVICAL SPINE 2 OR 3 VIEWS: ICD-10-PCS | Mod: S$GLB,,, | Performed by: RADIOLOGY

## 2019-06-04 PROCEDURE — 3078F PR MOST RECENT DIASTOLIC BLOOD PRESSURE < 80 MM HG: ICD-10-PCS | Mod: CPTII,S$GLB,, | Performed by: PHYSICIAN ASSISTANT

## 2019-06-04 PROCEDURE — 3077F SYST BP >= 140 MM HG: CPT | Mod: CPTII,S$GLB,, | Performed by: PHYSICIAN ASSISTANT

## 2019-06-04 PROCEDURE — 3078F DIAST BP <80 MM HG: CPT | Mod: CPTII,S$GLB,, | Performed by: PHYSICIAN ASSISTANT

## 2019-06-04 PROCEDURE — 1101F PR PT FALLS ASSESS DOC 0-1 FALLS W/OUT INJ PAST YR: ICD-10-PCS | Mod: CPTII,S$GLB,, | Performed by: PHYSICIAN ASSISTANT

## 2019-06-04 PROCEDURE — 96372 PR INJECTION,THERAP/PROPH/DIAG2ST, IM OR SUBCUT: ICD-10-PCS | Mod: S$GLB,,, | Performed by: EMERGENCY MEDICINE

## 2019-06-04 PROCEDURE — 99214 PR OFFICE/OUTPT VISIT, EST, LEVL IV, 30-39 MIN: ICD-10-PCS | Mod: 25,S$GLB,, | Performed by: PHYSICIAN ASSISTANT

## 2019-06-04 PROCEDURE — 72040 X-RAY EXAM NECK SPINE 2-3 VW: CPT | Mod: S$GLB,,, | Performed by: RADIOLOGY

## 2019-06-04 RX ORDER — KETOROLAC TROMETHAMINE 30 MG/ML
30 INJECTION, SOLUTION INTRAMUSCULAR; INTRAVENOUS
Status: COMPLETED | OUTPATIENT
Start: 2019-06-04 | End: 2019-06-04

## 2019-06-04 RX ORDER — NAPROXEN 500 MG/1
500 TABLET ORAL 2 TIMES DAILY WITH MEALS
Qty: 14 TABLET | Refills: 0 | Status: SHIPPED | OUTPATIENT
Start: 2019-06-04 | End: 2019-06-11

## 2019-06-04 RX ORDER — AZITHROMYCIN 250 MG/1
TABLET, FILM COATED ORAL
Qty: 6 TABLET | Refills: 0 | Status: SHIPPED | OUTPATIENT
Start: 2019-06-04 | End: 2020-01-28 | Stop reason: CLARIF

## 2019-06-04 RX ORDER — ERYTHROMYCIN 5 MG/G
OINTMENT OPHTHALMIC 3 TIMES DAILY
Qty: 1 G | Refills: 1 | Status: SHIPPED | OUTPATIENT
Start: 2019-06-04 | End: 2020-01-28 | Stop reason: CLARIF

## 2019-06-04 RX ADMIN — KETOROLAC TROMETHAMINE 30 MG: 30 INJECTION, SOLUTION INTRAMUSCULAR; INTRAVENOUS at 01:06

## 2019-06-04 NOTE — PATIENT INSTRUCTIONS
If your condition worsens or fails to improve we recommend that you receive another evaluation at the ER immediately or contact your PCP to discuss your concerns or return here.     You must understand that you've received an urgent care treatment only and that you may be released before all your medical problems are known or treated.     You the patient will arrange for followup care as instructed.     Neck Pain/Headaches  Please return here or go to the Emergency Department for any concerns or worsening of condition.   - Begin your anti- inflammatory (naprosyn) on  6/5/19  ONLY TAKE AS NEEDED.   You can take the Muscle Relaxant (flexeril) on today as prescribed.   Follow up with your PCP in the next 3-5 days or sooner if no improvement.    Ear Infection  Take full course of antibiotics as prescribed.  Warm compresses to affected ear  Elevate head on a pillow at night   Use nasal spray directed for nasal congestion  Tylenol or Motrin every 4 - 6 hours as needed for fever or ear pain.  Follow up with your PCP in 1 week of initiating antibiotics or sooner for no improvement in symptoms  Follow up in the ER for any worsening of symptoms such as new fever, increasing ear pain, neck stiffness, shortness of breath, etc.  If you smoke, stop smoking.    Conjunctivitis  Keep eyes cleaned.  Use the eye drops as prescribed.    Do not wear your contact lens ( if you use them) for at least 5 days after you stop having symptoms and are rechecked by your doctor.   Avoid sharing towels while infection persist.  Cool compresses to affected eye.   Throw away the contacts, contact solution and carrying case you were using and start with new material.   If you develop increase eye symptoms or change in your vision seek medical care immediately either with your ophthalomologist or the ER or return here.       General Neck and Back Pain    Both neck and back pain are usually caused by injury to the muscles or ligaments of the spine.  Sometimes the disks that separate each bone of the spine may cause pain by pressing on a nearby nerve. Back and neck pain may appear after a sudden twisting or bending force (such as in a car accident), or sometimes after a simple awkward movement. In either case, muscle spasm is often present and adds to the pain.  Acute neck and back pain usually gets better in 1 to 2 weeks. Pain related to disk disease, arthritis in the spinal joints or spinal stenosis (narrowing of the spinal canal) can become chronic and last for months or years.  Back and neck pain are common problems. Most people feel better in 1 or 2 weeks, and most of the rest in 1 to 2 months. Most people can remain active.  People experience and describe pain differently.  · Pain can be sharp, stabbing, shooting, aching, cramping, or burning  · Movement, standing, bending, lifting, sitting, or walking may worsen the pain  · Pain can be localized to one spot or area, or it can be more generalized  · Pain can spread or radiate upwards, downwards, to the front, or go down your arms  · Muscle spasm may occur.  Most of the time mechanical problems with the muscles or spine cause the pain. it is usually caused by an injury, whether known or not, to the muscles or ligaments. While illnesses can cause back pain, it is usually not caused by a serious illness. Pain is usually related to physical activity, whether sports, exercise, work, or normal activity. Sometimes it can occur without an identifiable cause. This can happen simply by stretching or moving wrong, without noting pain at the time. Other causes include:  · Overexertion, lifting, pushing, pulling incorrectly or too aggressively.  · Sudden twisting, bending or stretching from an accident (car or fall), or accidental movement.  · Poor posture  · Poor conditioning, lack of regular exercise  · Spinal disc disease or arthritis  · Stress  · Pregnancy, or illness like appendicitis, bladder or kidney infection,  pelvic infections   Home care  · For neck pain: Use a comfortable pillow that supports the head and keeps the spine in a neutral position. The position of the head should not be tilted forward or backward.  · When in bed, try to find a position of comfort. A firm mattress is best. Try lying flat on your back with pillows under your knees. You can also try lying on your side with your knees bent up towards your chest and a pillow between your knees.  · At first, do not try to stretch out the sore spots. If there is a strain, it is not like the good soreness you get after exercising without an injury. In this case, stretching may make it worse.  · Avoid prolonged sitting, long car rides or travel. This puts more stress on the lower back than standing or walking.  · During the first 24 to 72 hours after an injury, apply an ice pack to the painful area for 20 minutes and then remove it for 20 minutes over a period of 60 to 90 minutes or several times a day.   · You can alternate ice and heat therapies. Talk with your healthcare provider about the best treatment for your back or neck pain. As a safety precaution, do not use a heating pad at bedtime. Sleeping with a heating pad can lead to skin burns or tissue damage.  · Therapeutic massage can help relax the back and neck muscles without stretching them.  · Be aware of safe lifting methods and do not lift anything over 15 pounds until all the pain is gone.  Medications  Talk to your healthcare provider before using medicine, especially if you have other medical problems or are taking other medicines.  · You may use over-the-counter medicine to control pain, unless another pain medicine was prescribed. If you have chronic conditions like diabetes, liver or kidney disease, stomach ulcers,  gastrointestinal bleeding, or are taking blood thinner medicines.  · Be careful if you are given pain medicines, narcotics, or medicine for muscle spasm. They can cause drowsiness, and  can affect your coordination, reflexes, and judgment. Do not drive or operate heavy machinery.  Follow-up care  Follow up with your healthcare provider, or as advised. Physical therapy or further tests may be needed.  If X-rays were taken, you will be notified of any new findings that may affect your care.  Call 911  Seek emergency medical care if any of the following occur:  · Trouble breathing  · Confusion  · Very drowsy or trouble awakening  · Fainting or loss of consciousness  · Rapid or very slow heart rate  · Loss of bowel or bladder control  When to seek medical advice  Call your healthcare provider right away if any of these occur:  · Pain becomes worse or spreads into your arms or legs  · Weakness, numbness or pain in one or both arms or legs  · Numbness in the groin area  · Difficulty walking  · Fever of 100.4ºF (38ºC) or higher, or as directed by your healthcare provider  Date Last Reviewed: 7/1/2016 © 2000-2017 [employer]. [address] [place] [address]. All rights reserved. This information is not intended as a substitute for professional medical care. Always follow your healthcare professional's instructions.        Tension Headaches     Massaging your neck muscles can help relieve tension headache pain.     Tension headaches cause a dull, steady pain on both sides of the head and in the neck and the back of the head. The eyes may also feel tired. Tension headaches can be triggered by muscle tension and clenching, lack of sleep, poor posture, eyestrain, stress,depression, anxiety, arthritis of the neck, and other factors.  To help prevent tension headaches  Take the following steps:  · Make sure your work area is properly set up to help you avoid neck strain and eyestrain.  · Make sure that your eyeglass prescription is current and is appropriate for the work you do.  · Learn techniques for relaxing and reducing emotional stress. These include deep breathing, progressive  relaxation, yoga, meditation, and biofeedback.  · Maintain a regular exercise regimen under the guidance of a doctor. This can help keep your neck and back flexible, strong, and relaxed.  To relieve the pain  Take the following steps:  · Use moist heat to relax the muscles. Soak in a hot bath or wrap a warm, moist towel around your neck.  · Brush your scalp lightly with a soft hairbrush.  · Give yourself a massage. Knead the muscles running from your shoulders up the back of your skull.  · Use an ice pack. Apply this directly to the place where you feel pain.  · Rest. Sleeping often helps relieve headache pain.  · Drink plenty of fluids. Dehydration is another trigger for headaches.  · Use pain medicine sparingly for moderate to severe pain.   Date Last Reviewed: 10/19/2015  © 9520-5100 [employer]. [address] [place] [address]. All rights reserved. This information is not intended as a substitute for professional medical care. Always follow your healthcare professional's instructions.        Conjunctivitis, Bacterial    You have an infection in the membranes covering the white part of the eye. This part of the eye is called the conjunctiva. The infection is called conjunctivitis. The most common symptoms of conjunctivitis include a thick, pus-like discharge from the eye, swollen eyelids, redness, eyelids sticking together upon awakening, and a gritty or scratchy feeling in the eye. Your infection was caused by bacteria. It may be treated with medicine. With treatment, the infection takes about 7 to 10 days to resolve.  Home care  · Use prescribed antibiotic eye drops or ointment as directed to treat the infection.  · Apply a warm compress (towel soaked in warm water) to the affected eye 3 to 4 times a day. Do this just before applying medicine to the eye.  · Use a warm, wet cloth to wipe away crusting of the eyelids in the morning. This is caused by mucus drainage during the night. You  may also use saline irrigating solution or artificial tears to rinse away mucus in the eye. Do not put a patch over the eye.  · Wash your hands before and after touching the infected eye. This is to prevent spreading the infection to the other eye, and to other people. Do not share your towels or washcloths with others.  · You may use acetaminophen or ibuprofen to control pain, unless another medicine was prescribed. (Note: If you have chronic liver or kidney disease or have ever had a stomach ulcer or gastrointestinal bleeding, talk with your doctor before using these medicines.)  · Do not wear contact lenses until your eyes have healed and all symptoms are gone.  Follow-up care  Follow up with your healthcare provider, or as advised.  When to seek medical advice  Call your healthcare provider right away if any of these occur:  · Worsening vision  · Increasing pain in the eye  · Increasing swelling or redness of the eyelid  · Redness spreading around the eye  Date Last Reviewed: 6/14/2015  © 3080-2810 [employer]. [address] [place] [address], [place] [address]. All rights reserved. This information is not intended as a substitute for professional medical care. Always follow your healthcare professional's instructions.        Middle Ear Infection (Adult)  You have an infection of the middle ear, the space behind the eardrum. This is also called acute otitis media (AOM). Sometimes it is caused by the common cold. This is because congestion can block the internal passage (eustachian tube) that drains fluid from the middle ear. When the middle ear fills with fluid, bacteria can grow there and cause an infection. Oral antibiotics are used to treat this illness, not ear drops. Symptoms usually start to improve within 1 to 2 days of treatment.    Home care  The following are general care guidelines:  · Finish all of the antibiotic medicine given, even though you may feel better after the first few days.  · You  may use over-the-counter medicine, such as acetaminophen or ibuprofen, to control pain and fever, unless something else was prescribed. If you have chronic liver or kidney disease or have ever had a stomach ulcer or gastrointestinal bleeding, talk with your healthcare provider before using these medicines. Do not give aspirin to anyone under 18 years of age who has a fever. It may cause severe illness or death.  Follow-up care  Follow up with your healthcare provider, or as advised, in 2 weeks if all symptoms have not gotten better, or if hearing doesn't go back to normal within 1 month.  When to seek medical advice  Call your healthcare provider right away if any of these occur:  · Ear pain gets worse or does not improve after 3 days of treatment  · Unusual drowsiness or confusion  · Neck pain, stiff neck, or headache  · Fluid or blood draining from the ear canal  · Fever of 100.4°F (38°C) or as advised   · Seizure  Date Last Reviewed: 6/1/2016  © 5803-2615 [email or web address]. [address] [place] [address], [place] [address]. All rights reserved. This information is not intended as a substitute for professional medical care. Always follow your healthcare professional's instructions.

## 2019-06-04 NOTE — PROGRESS NOTES
"Subjective:       Patient ID: Janusz Montgomery Jr. is a 74 y.o. male.    Vitals:  height is 6' 1" (1.854 m) and weight is 158.8 kg (350 lb) (abnormal). His temperature is 100.2 °F (37.9 °C). His blood pressure is 148/71 (abnormal) and his pulse is 111 (abnormal). His respiration is 18 and oxygen saturation is 97%.     Chief Complaint: Sinus Problem    Pt c/o lately been having headache in the back of his head that feels like "pressure."  Reports he has been taking extra strength tylenol for headaches, which improves headache, but does not take headache away completely. And, patient reports headache "comes right back." Patient also reports neck pain that goes into back. States his PCP told him it may be related to OA, but states his PCP never worked him up for this. Reports he fell in his garden a week ago, but denies head injury or LOC during the fall. Reports headaches were happening before the fall and states headaches have not worsened since the fall. He denies changes in vision with the headaches, but he does reports eye irritation and discharge. States he uses a CPAP and is unsure if his eyes are just dry due to the machine. He also reports having bilateral ear pain x 3-4 days.     Headache    This is a new problem. The current episode started 1 to 4 weeks ago. The problem has been gradually worsening. Associated symptoms include ear pain, eye redness, hearing loss (wears hearing aids) and neck pain. Pertinent negatives include no abdominal pain, blurred vision, coughing, dizziness, fever, nausea, numbness, photophobia, sinus pressure, sore throat or vomiting.       Constitution: Negative for chills, sweating, fatigue and fever.   HENT: Positive for ear pain and hearing loss (wears hearing aids). Negative for ear discharge, postnasal drip, sinus pain, sinus pressure and sore throat.    Neck: Positive for neck pain. Negative for neck stiffness and painful lymph nodes.   Cardiovascular: Negative for chest pain and " leg swelling.   Eyes: Positive for eye discharge, eye itching and eye redness. Negative for photophobia, vision loss, double vision and blurred vision.   Respiratory: Negative for cough, shortness of breath, stridor and wheezing.    Gastrointestinal: Negative for abdominal pain, nausea, vomiting and diarrhea.   Genitourinary: Negative for dysuria, frequency and urgency.   Musculoskeletal: Negative for joint pain, joint swelling, muscle cramps and muscle ache.   Skin: Negative for color change, pale and rash.   Allergic/Immunologic: Negative for seasonal allergies.   Neurological: Positive for headaches. Negative for dizziness, history of vertigo, light-headedness, passing out, numbness and tingling.   Hematologic/Lymphatic: Negative for swollen lymph nodes, easy bruising/bleeding and history of blood clots. Does not bruise/bleed easily.   Psychiatric/Behavioral: Negative for nervous/anxious, sleep disturbance and depression. The patient is not nervous/anxious.        Objective:      Physical Exam   Constitutional: He is oriented to person, place, and time. Vital signs are normal. He appears well-developed and well-nourished. He is active and cooperative.  Non-toxic appearance. He does not appear ill. No distress.   Patient is sitting pleasantly in exam room in no acute distress. Nontoxic appearing.   HENT:   Head: Normocephalic and atraumatic.   Right Ear: External ear and ear canal normal. Tympanic membrane is erythematous and bulging. A middle ear effusion is present. Decreased hearing (wears hearing aids) is noted.   Left Ear: External ear and ear canal normal. Tympanic membrane is scarred. A middle ear effusion is present. Decreased hearing (wears hearing aids) is noted.   Nose: Nose normal. No mucosal edema, rhinorrhea or nasal deformity. No epistaxis. Right sinus exhibits no maxillary sinus tenderness and no frontal sinus tenderness. Left sinus exhibits no maxillary sinus tenderness and no frontal sinus  tenderness.   Mouth/Throat: Uvula is midline, oropharynx is clear and moist and mucous membranes are normal. No trismus in the jaw. Normal dentition. No uvula swelling. No posterior oropharyngeal erythema.   No temporal TTP   Eyes: Pupils are equal, round, and reactive to light. EOM and lids are normal. Right eye exhibits discharge and exudate. Left eye exhibits discharge. Left eye exhibits no exudate. Right conjunctiva is injected. Left conjunctiva is injected. No scleral icterus.   Sclera clear bilat   Neck: Trachea normal, normal range of motion, full passive range of motion without pain and phonation normal. Neck supple. Spinous process tenderness and muscular tenderness present. No neck rigidity. No edema, no erythema and normal range of motion present.   Cardiovascular: Normal rate, regular rhythm, normal heart sounds, intact distal pulses and normal pulses.   Pulmonary/Chest: Effort normal and breath sounds normal. No respiratory distress. He has no wheezes.   Abdominal: Soft. Normal appearance and bowel sounds are normal. He exhibits no distension, no abdominal bruit, no pulsatile midline mass and no mass. There is no tenderness.   Musculoskeletal: Normal range of motion. He exhibits no edema or deformity.   Lymphadenopathy:     He has no cervical adenopathy.   Neurological: He is alert and oriented to person, place, and time. He has normal strength and normal reflexes. He is not disoriented. No cranial nerve deficit or sensory deficit. He exhibits normal muscle tone. He displays a negative Romberg sign. Coordination and gait normal.   Grossly intact   Skin: Skin is warm, dry and intact. He is not diaphoretic. No pallor.   Psychiatric: He has a normal mood and affect. His speech is normal and behavior is normal. Judgment and thought content normal. Cognition and memory are normal.   Nursing note and vitals reviewed.      X-ray Cervical Spine 2 Or 3 Views  Result Date: 6/4/2019  EXAMINATION: XR CERVICAL SPINE  2 OR 3 VIEWS CLINICAL HISTORY: Cervicalgia TECHNIQUE: AP, lateral and open mouth views of the cervical spine were performed. COMPARISON: None. FINDINGS: There is poor visualization of C7 and the cervicothoracic junction.  Posterior vertebral alignment is satisfactory.  Vertebral body heights are well maintained.  There is no evidence for acute fracture or bone destruction.  The odontoid is intact.  There are degenerative changes present with small anterior osteophytes noted throughout the cervical spine.  Prevertebral soft tissues are unremarkable.   Poor visualization of C7 and the cervicothoracic junction. Cervical spondylosis with small anterior osteophytes noted throughout the cervical spine. No evidence for acute fracture, bone destruction, or subluxation within the visualized cervical spine. Electronically signed by: Simone Montilla MD Date:    06/04/2019 Time: 12:44      Will start on short course of NSAIDs for tension headaches and OA pain of neck. Discussed in detail risks (includeing internal bleeding/death) with patient. Advised on ER precautions. Stressed importance of close follow up with his PCP. Answered all patient questions to satisfaction. Patient voiced understanding and is in agreement with current treatment plan.    Discussed patient with Dr. Monte, who agrees with current plan of care.    Assessment:       1. Neck pain    2. Tension headache    3. Acute suppurative otitis media of right ear without spontaneous rupture of tympanic membrane, recurrence not specified    4. Bacterial conjunctivitis of both eyes        Plan:         Neck pain  -     X-Ray Cervical Spine 2 or 3 Views; Future; Expected date: 06/04/2019  -     ketorolac injection 30 mg  -     naproxen (NAPROSYN) 500 MG tablet; Take 1 tablet (500 mg total) by mouth 2 (two) times daily with meals. for 7 days  Dispense: 14 tablet; Refill: 0    Tension headache  -     ketorolac injection 30 mg  -     naproxen (NAPROSYN) 500 MG tablet; Take  1 tablet (500 mg total) by mouth 2 (two) times daily with meals. for 7 days  Dispense: 14 tablet; Refill: 0    Acute suppurative otitis media of right ear without spontaneous rupture of tympanic membrane, recurrence not specified  -     azithromycin (Z-JESSICA) 250 MG tablet; Take 2 tablets by mouth on day 1; Take 1 tablet by mouth on days 2-5  Dispense: 6 tablet; Refill: 0    Bacterial conjunctivitis of both eyes  -     erythromycin (ROMYCIN) ophthalmic ointment; Place into both eyes 3 (three) times daily.  Dispense: 1 g; Refill: 1      Patient Instructions       If your condition worsens or fails to improve we recommend that you receive another evaluation at the ER immediately or contact your PCP to discuss your concerns or return here.     You must understand that you've received an urgent care treatment only and that you may be released before all your medical problems are known or treated.     You the patient will arrange for followup care as instructed.     Neck Pain/Headaches  Please return here or go to the Emergency Department for any concerns or worsening of condition.   - Begin your anti- inflammatory (naprosyn) on  6/5/19  ONLY TAKE AS NEEDED.   You can take the Muscle Relaxant (flexeril) on today as prescribed.   Follow up with your PCP in the next 3-5 days or sooner if no improvement.    Ear Infection  Take full course of antibiotics as prescribed.  Warm compresses to affected ear  Elevate head on a pillow at night   Use nasal spray directed for nasal congestion  Tylenol or Motrin every 4 - 6 hours as needed for fever or ear pain.  Follow up with your PCP in 1 week of initiating antibiotics or sooner for no improvement in symptoms  Follow up in the ER for any worsening of symptoms such as new fever, increasing ear pain, neck stiffness, shortness of breath, etc.  If you smoke, stop smoking.    Conjunctivitis  Keep eyes cleaned.  Use the eye drops as prescribed.    Do not wear your contact lens ( if you use  them) for at least 5 days after you stop having symptoms and are rechecked by your doctor.   Avoid sharing towels while infection persist.  Cool compresses to affected eye.   Throw away the contacts, contact solution and carrying case you were using and start with new material.   If you develop increase eye symptoms or change in your vision seek medical care immediately either with your ophthalomologist or the ER or return here.       General Neck and Back Pain    Both neck and back pain are usually caused by injury to the muscles or ligaments of the spine. Sometimes the disks that separate each bone of the spine may cause pain by pressing on a nearby nerve. Back and neck pain may appear after a sudden twisting or bending force (such as in a car accident), or sometimes after a simple awkward movement. In either case, muscle spasm is often present and adds to the pain.  Acute neck and back pain usually gets better in 1 to 2 weeks. Pain related to disk disease, arthritis in the spinal joints or spinal stenosis (narrowing of the spinal canal) can become chronic and last for months or years.  Back and neck pain are common problems. Most people feel better in 1 or 2 weeks, and most of the rest in 1 to 2 months. Most people can remain active.  People experience and describe pain differently.  · Pain can be sharp, stabbing, shooting, aching, cramping, or burning  · Movement, standing, bending, lifting, sitting, or walking may worsen the pain  · Pain can be localized to one spot or area, or it can be more generalized  · Pain can spread or radiate upwards, downwards, to the front, or go down your arms  · Muscle spasm may occur.  Most of the time mechanical problems with the muscles or spine cause the pain. it is usually caused by an injury, whether known or not, to the muscles or ligaments. While illnesses can cause back pain, it is usually not caused by a serious illness. Pain is usually related to physical activity,  whether sports, exercise, work, or normal activity. Sometimes it can occur without an identifiable cause. This can happen simply by stretching or moving wrong, without noting pain at the time. Other causes include:  · Overexertion, lifting, pushing, pulling incorrectly or too aggressively.  · Sudden twisting, bending or stretching from an accident (car or fall), or accidental movement.  · Poor posture  · Poor conditioning, lack of regular exercise  · Spinal disc disease or arthritis  · Stress  · Pregnancy, or illness like appendicitis, bladder or kidney infection, pelvic infections   Home care  · For neck pain: Use a comfortable pillow that supports the head and keeps the spine in a neutral position. The position of the head should not be tilted forward or backward.  · When in bed, try to find a position of comfort. A firm mattress is best. Try lying flat on your back with pillows under your knees. You can also try lying on your side with your knees bent up towards your chest and a pillow between your knees.  · At first, do not try to stretch out the sore spots. If there is a strain, it is not like the good soreness you get after exercising without an injury. In this case, stretching may make it worse.  · Avoid prolonged sitting, long car rides or travel. This puts more stress on the lower back than standing or walking.  · During the first 24 to 72 hours after an injury, apply an ice pack to the painful area for 20 minutes and then remove it for 20 minutes over a period of 60 to 90 minutes or several times a day.   · You can alternate ice and heat therapies. Talk with your healthcare provider about the best treatment for your back or neck pain. As a safety precaution, do not use a heating pad at bedtime. Sleeping with a heating pad can lead to skin burns or tissue damage.  · Therapeutic massage can help relax the back and neck muscles without stretching them.  · Be aware of safe lifting methods and do not lift  anything over 15 pounds until all the pain is gone.  Medications  Talk to your healthcare provider before using medicine, especially if you have other medical problems or are taking other medicines.  · You may use over-the-counter medicine to control pain, unless another pain medicine was prescribed. If you have chronic conditions like diabetes, liver or kidney disease, stomach ulcers,  gastrointestinal bleeding, or are taking blood thinner medicines.  · Be careful if you are given pain medicines, narcotics, or medicine for muscle spasm. They can cause drowsiness, and can affect your coordination, reflexes, and judgment. Do not drive or operate heavy machinery.  Follow-up care  Follow up with your healthcare provider, or as advised. Physical therapy or further tests may be needed.  If X-rays were taken, you will be notified of any new findings that may affect your care.  Call 911  Seek emergency medical care if any of the following occur:  · Trouble breathing  · Confusion  · Very drowsy or trouble awakening  · Fainting or loss of consciousness  · Rapid or very slow heart rate  · Loss of bowel or bladder control  When to seek medical advice  Call your healthcare provider right away if any of these occur:  · Pain becomes worse or spreads into your arms or legs  · Weakness, numbness or pain in one or both arms or legs  · Numbness in the groin area  · Difficulty walking  · Fever of 100.4ºF (38ºC) or higher, or as directed by your healthcare provider  Date Last Reviewed: 7/1/2016  © 4031-6979 [employer]. [address] [place] [address]. All rights reserved. This information is not intended as a substitute for professional medical care. Always follow your healthcare professional's instructions.        Tension Headaches     Massaging your neck muscles can help relieve tension headache pain.     Tension headaches cause a dull, steady pain on both sides of the head and in the neck and the back of the  head. The eyes may also feel tired. Tension headaches can be triggered by muscle tension and clenching, lack of sleep, poor posture, eyestrain, stress,depression, anxiety, arthritis of the neck, and other factors.  To help prevent tension headaches  Take the following steps:  · Make sure your work area is properly set up to help you avoid neck strain and eyestrain.  · Make sure that your eyeglass prescription is current and is appropriate for the work you do.  · Learn techniques for relaxing and reducing emotional stress. These include deep breathing, progressive relaxation, yoga, meditation, and biofeedback.  · Maintain a regular exercise regimen under the guidance of a doctor. This can help keep your neck and back flexible, strong, and relaxed.  To relieve the pain  Take the following steps:  · Use moist heat to relax the muscles. Soak in a hot bath or wrap a warm, moist towel around your neck.  · Brush your scalp lightly with a soft hairbrush.  · Give yourself a massage. Knead the muscles running from your shoulders up the back of your skull.  · Use an ice pack. Apply this directly to the place where you feel pain.  · Rest. Sleeping often helps relieve headache pain.  · Drink plenty of fluids. Dehydration is another trigger for headaches.  · Use pain medicine sparingly for moderate to severe pain.   Date Last Reviewed: 10/19/2015  © 8792-3995 [employer]. [address] [place], [place] [id number]. All rights reserved. This information is not intended as a substitute for professional medical care. Always follow your healthcare professional's instructions.        Conjunctivitis, Bacterial    You have an infection in the membranes covering the white part of the eye. This part of the eye is called the conjunctiva. The infection is called conjunctivitis. The most common symptoms of conjunctivitis include a thick, pus-like discharge from the eye, swollen eyelids, redness, eyelids sticking together upon  awakening, and a gritty or scratchy feeling in the eye. Your infection was caused by bacteria. It may be treated with medicine. With treatment, the infection takes about 7 to 10 days to resolve.  Home care  · Use prescribed antibiotic eye drops or ointment as directed to treat the infection.  · Apply a warm compress (towel soaked in warm water) to the affected eye 3 to 4 times a day. Do this just before applying medicine to the eye.  · Use a warm, wet cloth to wipe away crusting of the eyelids in the morning. This is caused by mucus drainage during the night. You may also use saline irrigating solution or artificial tears to rinse away mucus in the eye. Do not put a patch over the eye.  · Wash your hands before and after touching the infected eye. This is to prevent spreading the infection to the other eye, and to other people. Do not share your towels or washcloths with others.  · You may use acetaminophen or ibuprofen to control pain, unless another medicine was prescribed. (Note: If you have chronic liver or kidney disease or have ever had a stomach ulcer or gastrointestinal bleeding, talk with your doctor before using these medicines.)  · Do not wear contact lenses until your eyes have healed and all symptoms are gone.  Follow-up care  Follow up with your healthcare provider, or as advised.  When to seek medical advice  Call your healthcare provider right away if any of these occur:  · Worsening vision  · Increasing pain in the eye  · Increasing swelling or redness of the eyelid  · Redness spreading around the eye  Date Last Reviewed: 6/14/2015  © 5688-6073 [employer]. [address] [place] [address], [place] [address]. All rights reserved. This information is not intended as a substitute for professional medical care. Always follow your healthcare professional's instructions.        Middle Ear Infection (Adult)  You have an infection of the middle ear, the space behind the eardrum. This is also called  acute otitis media (AOM). Sometimes it is caused by the common cold. This is because congestion can block the internal passage (eustachian tube) that drains fluid from the middle ear. When the middle ear fills with fluid, bacteria can grow there and cause an infection. Oral antibiotics are used to treat this illness, not ear drops. Symptoms usually start to improve within 1 to 2 days of treatment.    Home care  The following are general care guidelines:  · Finish all of the antibiotic medicine given, even though you may feel better after the first few days.  · You may use over-the-counter medicine, such as acetaminophen or ibuprofen, to control pain and fever, unless something else was prescribed. If you have chronic liver or kidney disease or have ever had a stomach ulcer or gastrointestinal bleeding, talk with your healthcare provider before using these medicines. Do not give aspirin to anyone under 18 years of age who has a fever. It may cause severe illness or death.  Follow-up care  Follow up with your healthcare provider, or as advised, in 2 weeks if all symptoms have not gotten better, or if hearing doesn't go back to normal within 1 month.  When to seek medical advice  Call your healthcare provider right away if any of these occur:  · Ear pain gets worse or does not improve after 3 days of treatment  · Unusual drowsiness or confusion  · Neck pain, stiff neck, or headache  · Fluid or blood draining from the ear canal  · Fever of 100.4°F (38°C) or as advised   · Seizure  Date Last Reviewed: 6/1/2016  © 4166-0099 [employer]. [address] [place] [address], [place] [address]. All rights reserved. This information is not intended as a substitute for professional medical care. Always follow your healthcare professional's instructions.

## 2019-06-05 ENCOUNTER — PATIENT MESSAGE (OUTPATIENT)
Dept: FAMILY MEDICINE | Facility: CLINIC | Age: 74
End: 2019-06-05

## 2019-06-05 ENCOUNTER — DOCUMENTATION ONLY (OUTPATIENT)
Dept: BARIATRICS | Facility: CLINIC | Age: 74
End: 2019-06-05

## 2019-06-05 ENCOUNTER — OFFICE VISIT (OUTPATIENT)
Dept: OTOLARYNGOLOGY | Facility: CLINIC | Age: 74
End: 2019-06-05
Payer: MEDICARE

## 2019-06-05 ENCOUNTER — PATIENT MESSAGE (OUTPATIENT)
Dept: BARIATRICS | Facility: CLINIC | Age: 74
End: 2019-06-05

## 2019-06-05 ENCOUNTER — PATIENT MESSAGE (OUTPATIENT)
Dept: CARDIOLOGY | Facility: CLINIC | Age: 74
End: 2019-06-05

## 2019-06-05 ENCOUNTER — PATIENT MESSAGE (OUTPATIENT)
Dept: OPTOMETRY | Facility: CLINIC | Age: 74
End: 2019-06-05

## 2019-06-05 VITALS — SYSTOLIC BLOOD PRESSURE: 116 MMHG | DIASTOLIC BLOOD PRESSURE: 69 MMHG | HEART RATE: 79 BPM

## 2019-06-05 DIAGNOSIS — R51.9 HEADACHE DISORDER: ICD-10-CM

## 2019-06-05 DIAGNOSIS — M54.2 POSTERIOR NECK PAIN: ICD-10-CM

## 2019-06-05 DIAGNOSIS — Z97.4 WEARS HEARING AID IN BOTH EARS: ICD-10-CM

## 2019-06-05 DIAGNOSIS — Z85.828 HISTORY OF SKIN CANCER: ICD-10-CM

## 2019-06-05 DIAGNOSIS — Z86.69 HISTORY OF HEARING LOSS: ICD-10-CM

## 2019-06-05 DIAGNOSIS — H61.21 IMPACTED CERUMEN OF RIGHT EAR: Primary | ICD-10-CM

## 2019-06-05 PROCEDURE — 99213 PR OFFICE/OUTPT VISIT, EST, LEVL III, 20-29 MIN: ICD-10-PCS | Mod: 25,HCNC,S$GLB, | Performed by: OTOLARYNGOLOGY

## 2019-06-05 PROCEDURE — 99213 OFFICE O/P EST LOW 20 MIN: CPT | Mod: 25,HCNC,S$GLB, | Performed by: OTOLARYNGOLOGY

## 2019-06-05 PROCEDURE — 99999 PR PBB SHADOW E&M-EST. PATIENT-LVL IV: ICD-10-PCS | Mod: PBBFAC,HCNC,, | Performed by: OTOLARYNGOLOGY

## 2019-06-05 PROCEDURE — 3288F FALL RISK ASSESSMENT DOCD: CPT | Mod: HCNC,CPTII,S$GLB, | Performed by: OTOLARYNGOLOGY

## 2019-06-05 PROCEDURE — 3288F PR FALLS RISK ASSESSMENT DOCUMENTED: ICD-10-PCS | Mod: HCNC,CPTII,S$GLB, | Performed by: OTOLARYNGOLOGY

## 2019-06-05 PROCEDURE — 1100F PTFALLS ASSESS-DOCD GE2>/YR: CPT | Mod: HCNC,CPTII,S$GLB, | Performed by: OTOLARYNGOLOGY

## 2019-06-05 PROCEDURE — 3074F SYST BP LT 130 MM HG: CPT | Mod: HCNC,CPTII,S$GLB, | Performed by: OTOLARYNGOLOGY

## 2019-06-05 PROCEDURE — 3078F PR MOST RECENT DIASTOLIC BLOOD PRESSURE < 80 MM HG: ICD-10-PCS | Mod: HCNC,CPTII,S$GLB, | Performed by: OTOLARYNGOLOGY

## 2019-06-05 PROCEDURE — 3078F DIAST BP <80 MM HG: CPT | Mod: HCNC,CPTII,S$GLB, | Performed by: OTOLARYNGOLOGY

## 2019-06-05 PROCEDURE — 69210 REMOVE IMPACTED EAR WAX UNI: CPT | Mod: HCNC,S$GLB,, | Performed by: OTOLARYNGOLOGY

## 2019-06-05 PROCEDURE — 3074F PR MOST RECENT SYSTOLIC BLOOD PRESSURE < 130 MM HG: ICD-10-PCS | Mod: HCNC,CPTII,S$GLB, | Performed by: OTOLARYNGOLOGY

## 2019-06-05 PROCEDURE — 69210 PR REMOVAL IMPACTED CERUMEN REQUIRING INSTRUMENTATION, UNILATERAL: ICD-10-PCS | Mod: HCNC,S$GLB,, | Performed by: OTOLARYNGOLOGY

## 2019-06-05 PROCEDURE — 99999 PR PBB SHADOW E&M-EST. PATIENT-LVL IV: CPT | Mod: PBBFAC,HCNC,, | Performed by: OTOLARYNGOLOGY

## 2019-06-05 PROCEDURE — 1100F PR PT FALLS ASSESS DOC 2+ FALLS/FALL W/INJURY/YR: ICD-10-PCS | Mod: HCNC,CPTII,S$GLB, | Performed by: OTOLARYNGOLOGY
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
## 2019-06-05 NOTE — PATIENT INSTRUCTIONS
Dry veneer of cerumen removed from AD TM surface/deep canal  No obvious acute ear, sinus or throat infection   Finish previously prescribed ZPAK course   Monitor eye condition (with ophthalmologist)   Rx for mupirocin ointment 2%; apply to nasal vestibules nightly x 2 weeks for infection  RTC 6 months for ear cleaning ; audiometry on return

## 2019-06-06 ENCOUNTER — OFFICE VISIT (OUTPATIENT)
Dept: OPTOMETRY | Facility: CLINIC | Age: 74
End: 2019-06-06
Payer: MEDICARE

## 2019-06-06 DIAGNOSIS — H10.13 ALLERGIC CONJUNCTIVITIS OF BOTH EYES: Primary | ICD-10-CM

## 2019-06-06 PROCEDURE — 99999 PR PBB SHADOW E&M-EST. PATIENT-LVL III: ICD-10-PCS | Mod: PBBFAC,HCNC,, | Performed by: OPTOMETRIST

## 2019-06-06 PROCEDURE — 99999 PR PBB SHADOW E&M-EST. PATIENT-LVL III: CPT | Mod: PBBFAC,HCNC,, | Performed by: OPTOMETRIST

## 2019-06-06 PROCEDURE — 92012 PR EYE EXAM, EST PATIENT,INTERMED: ICD-10-PCS | Mod: HCNC,S$GLB,, | Performed by: OPTOMETRIST

## 2019-06-06 PROCEDURE — 92012 INTRM OPH EXAM EST PATIENT: CPT | Mod: HCNC,S$GLB,, | Performed by: OPTOMETRIST

## 2019-06-06 RX ORDER — PREDNISOLONE ACETATE 10 MG/ML
1 SUSPENSION/ DROPS OPHTHALMIC 4 TIMES DAILY
Qty: 10 ML | Refills: 0 | Status: SHIPPED | OUTPATIENT
Start: 2019-06-06 | End: 2019-07-06

## 2019-06-07 NOTE — PROGRESS NOTES
HPI     Pt is coming in for f/u from ent care visit.   Starting having redness OD, after using erythromycin eyes started to swell  Swelling and itching- 4 on scale    Last edited by Pat Cueva, OD on 6/6/2019  3:35 PM. (History)        ROS     Negative for: Constitutional, Gastrointestinal, Neurological, Skin,   Genitourinary, Musculoskeletal, HENT, Endocrine, Cardiovascular, Eyes,   Respiratory, Psychiatric, Allergic/Imm, Heme/Lymph    Last edited by Pat Cueva, OD on 6/6/2019  3:35 PM. (History)        Assessment /Plan     For exam results, see Encounter Report.    Allergic conjunctivitis of both eyes  -     prednisoLONE acetate (PRED FORTE) 1 % DrpS; Place 1 drop into both eyes 4 (four) times daily. 4x/day for 1 wk, 3x/day for 4 days, 2x/day for 4 days, 1x/day for 4 days then STOP  Dispense: 10 mL; Refill: 0    No e/o herpes simplex keratitis. Swelling and papillae consistent w/allergic conjunctivitis. Rx PF QID OU w/taper. RTC 3 weeks IOP check. Will call for update in 1 week

## 2019-06-12 ENCOUNTER — DOCUMENTATION ONLY (OUTPATIENT)
Dept: BARIATRICS | Facility: CLINIC | Age: 74
End: 2019-06-12

## 2019-06-14 ENCOUNTER — TELEPHONE (OUTPATIENT)
Dept: OPTOMETRY | Facility: CLINIC | Age: 74
End: 2019-06-14

## 2019-06-14 NOTE — PROGRESS NOTES
"Last 5 Patient Entered Readings                                      Current 30 Day Average: 144/60     Recent Readings 6/14/2019 6/12/2019 6/7/2019 4/13/2019 4/3/2019    SBP (mmHg) 132 138 149 141 150    DBP (mmHg) 70 59 60 63 87    Pulse 98 89 78 79 70        Digital Medicine: Health  Follow Up    Lifestyle Modifications:    1.Dietary Modifications (Sodium intake <2,000mg/day, food labels, dining out): Deferred    2.Physical Activity: Deferred    3.Medication Therapy: Patient has been compliant with the medication regimen.    4.Patient has the following medication side effects/concerns: None  (Frequency/Alleviating factors/Precipitating factors, etc.)     Follow up with Mr. Janusz Montgomery . completed. Mr. Montgomery is doing okay. He reports that his "heart is fine", but he is dealing with a few other health issues. His most worrisome news is about arthritis in his neck. He asked who he should see about this. Advised patient to discuss with his PCP. Patient verbalized understanding. Encouraged patient to continue to submit frequent readings. No further questions or concerns. He thanked me for "being there". Will continue to follow up to achieve health goals.               "

## 2019-06-21 ENCOUNTER — HOSPITAL ENCOUNTER (OUTPATIENT)
Dept: PULMONOLOGY | Facility: CLINIC | Age: 74
Discharge: HOME | End: 2019-06-21
Payer: MEDICARE

## 2019-06-21 ENCOUNTER — OFFICE VISIT (OUTPATIENT)
Dept: PULMONOLOGY | Facility: CLINIC | Age: 74
End: 2019-06-21
Payer: MEDICARE

## 2019-06-21 VITALS
DIASTOLIC BLOOD PRESSURE: 66 MMHG | HEART RATE: 84 BPM | OXYGEN SATURATION: 93 % | HEIGHT: 73 IN | WEIGHT: 315 LBS | BODY MASS INDEX: 41.75 KG/M2 | SYSTOLIC BLOOD PRESSURE: 112 MMHG

## 2019-06-21 VITALS — WEIGHT: 315 LBS | BODY MASS INDEX: 41.75 KG/M2 | HEIGHT: 73 IN

## 2019-06-21 DIAGNOSIS — R06.02 SOB (SHORTNESS OF BREATH): ICD-10-CM

## 2019-06-21 DIAGNOSIS — J98.4 RESTRICTIVE LUNG DISEASE: Primary | ICD-10-CM

## 2019-06-21 DIAGNOSIS — R06.09 DYSPNEA ON EXERTION: ICD-10-CM

## 2019-06-21 DIAGNOSIS — R09.82 POST-NASAL DRIP: ICD-10-CM

## 2019-06-21 DIAGNOSIS — J30.2 SEASONAL ALLERGIC RHINITIS, UNSPECIFIED TRIGGER: ICD-10-CM

## 2019-06-21 PROCEDURE — 99214 OFFICE O/P EST MOD 30 MIN: CPT | Mod: 25,HCNC,GC,S$GLB | Performed by: STUDENT IN AN ORGANIZED HEALTH CARE EDUCATION/TRAINING PROGRAM

## 2019-06-21 PROCEDURE — 1101F PR PT FALLS ASSESS DOC 0-1 FALLS W/OUT INJ PAST YR: ICD-10-PCS | Mod: HCNC,CPTII,GC,S$GLB | Performed by: STUDENT IN AN ORGANIZED HEALTH CARE EDUCATION/TRAINING PROGRAM

## 2019-06-21 PROCEDURE — 99999 PR PBB SHADOW E&M-EST. PATIENT-LVL V: CPT | Mod: PBBFAC,HCNC,GC, | Performed by: STUDENT IN AN ORGANIZED HEALTH CARE EDUCATION/TRAINING PROGRAM

## 2019-06-21 PROCEDURE — 99214 PR OFFICE/OUTPT VISIT, EST, LEVL IV, 30-39 MIN: ICD-10-PCS | Mod: 25,HCNC,GC,S$GLB | Performed by: STUDENT IN AN ORGANIZED HEALTH CARE EDUCATION/TRAINING PROGRAM

## 2019-06-21 PROCEDURE — 1101F PT FALLS ASSESS-DOCD LE1/YR: CPT | Mod: HCNC,CPTII,GC,S$GLB | Performed by: STUDENT IN AN ORGANIZED HEALTH CARE EDUCATION/TRAINING PROGRAM

## 2019-06-21 PROCEDURE — 99999 PR PBB SHADOW E&M-EST. PATIENT-LVL V: ICD-10-PCS | Mod: PBBFAC,HCNC,GC, | Performed by: STUDENT IN AN ORGANIZED HEALTH CARE EDUCATION/TRAINING PROGRAM

## 2019-06-21 PROCEDURE — 94618 PULMONARY STRESS TESTING: CPT | Mod: HCNC,S$GLB,, | Performed by: INTERNAL MEDICINE

## 2019-06-21 PROCEDURE — 3074F PR MOST RECENT SYSTOLIC BLOOD PRESSURE < 130 MM HG: ICD-10-PCS | Mod: HCNC,CPTII,GC,S$GLB | Performed by: STUDENT IN AN ORGANIZED HEALTH CARE EDUCATION/TRAINING PROGRAM

## 2019-06-21 PROCEDURE — 94729 PR C02/MEMBANE DIFFUSE CAPACITY: ICD-10-PCS | Mod: HCNC,S$GLB,, | Performed by: INTERNAL MEDICINE

## 2019-06-21 PROCEDURE — 3074F SYST BP LT 130 MM HG: CPT | Mod: HCNC,CPTII,GC,S$GLB | Performed by: STUDENT IN AN ORGANIZED HEALTH CARE EDUCATION/TRAINING PROGRAM

## 2019-06-21 PROCEDURE — 94618 PULMONARY STRESS TESTING: ICD-10-PCS | Mod: HCNC,S$GLB,, | Performed by: INTERNAL MEDICINE

## 2019-06-21 PROCEDURE — 94727 PR PULM FUNCTION TEST BY GAS: ICD-10-PCS | Mod: HCNC,S$GLB,, | Performed by: INTERNAL MEDICINE

## 2019-06-21 PROCEDURE — 94727 GAS DIL/WSHOT DETER LNG VOL: CPT | Mod: HCNC,S$GLB,, | Performed by: INTERNAL MEDICINE

## 2019-06-21 PROCEDURE — 94729 DIFFUSING CAPACITY: CPT | Mod: HCNC,S$GLB,, | Performed by: INTERNAL MEDICINE

## 2019-06-21 PROCEDURE — 3078F DIAST BP <80 MM HG: CPT | Mod: HCNC,CPTII,GC,S$GLB | Performed by: STUDENT IN AN ORGANIZED HEALTH CARE EDUCATION/TRAINING PROGRAM

## 2019-06-21 PROCEDURE — 3078F PR MOST RECENT DIASTOLIC BLOOD PRESSURE < 80 MM HG: ICD-10-PCS | Mod: HCNC,CPTII,GC,S$GLB | Performed by: STUDENT IN AN ORGANIZED HEALTH CARE EDUCATION/TRAINING PROGRAM

## 2019-06-21 RX ORDER — FLUTICASONE PROPIONATE 50 MCG
1 SPRAY, SUSPENSION (ML) NASAL DAILY
Qty: 1 BOTTLE | Refills: 0 | Status: SHIPPED | OUTPATIENT
Start: 2019-06-21 | End: 2020-01-02 | Stop reason: SDUPTHER

## 2019-06-21 RX ORDER — AZELASTINE 1 MG/ML
1 SPRAY, METERED NASAL 2 TIMES DAILY
Qty: 30 ML | Refills: 2 | Status: ON HOLD | OUTPATIENT
Start: 2019-06-21 | End: 2020-08-27 | Stop reason: HOSPADM

## 2019-06-23 NOTE — PROGRESS NOTES
I have reviewed the notes, assessments, and/or procedures performed by Dr. Fields, I concur with her/his documentation of Janusz Montgomery Jr..

## 2019-06-23 NOTE — PROGRESS NOTES
Subjective:      Patient ID: Janusz Montgomery Jr. is a 74 y.o. male.    Chief Complaint: PURDY    Mr. Montgomery is a 75 yo M with a PMH of HTN, CAD s/p PCI, atrial fibrillation, CHARISMA on CPAP, and skin CA who presents to pulmonary clinic to establish care. Patient follows closely with his PCP and cardiologist.    Patient states that over the last 3-4 months, he has been having increased dyspnea with exertion. He normally has some dyspnea with exertion at baseline but it has worsened significantly. More recently, his SOB has become very severe and that he is only able to walk 1/4th block. Sometimes just flipping in bed causes him to become short of breath.     Patient states that he does have somewhat of a dry cough with deep inspiration, but no sputum production or wheezing or hemoptysis. Patient does have a history of allergies and post-nasal dirp especially in the winter time and the beginning of summer. Since his PURDY has worsened, he does not feel like he has had worsening allergies or PND.    Patient has known sleep apnea and wears his CPAP every night. Patient retired back in 2005 and was a . 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 Patient is a never-smoker. Patient has not had any recent moves or travel. Patient denies trouble swallowing, raynauds, joint pain, rashes. He states that he has dry eyes.     Review of Systems   Constitutional: Negative for fever and chills.   HENT: Negative for postnasal drip, rhinorrhea, sore throat and congestion.    Eyes: Negative for redness and itching.   Respiratory: Positive for snoring, cough, shortness of breath and dyspnea on extertion. Negative for hemoptysis, sputum production and wheezing.    Cardiovascular: Negative for chest pain and leg swelling.   Musculoskeletal: Negative for arthralgias and myalgias.   Skin: Negative for rash.   Gastrointestinal: Negative for  "nausea, vomiting, abdominal pain and abdominal distention.   Neurological: Positive for headaches. Negative for dizziness.     Objective:   /66 (BP Location: Right arm, Patient Position: Sitting)   Pulse 84   Ht 6' 1" (1.854 m)   Wt (!) 154.2 kg (340 lb)   SpO2 (!) 93%   BMI 44.86 kg/m²     Physical Exam   Constitutional: He is oriented to person, place, and time. He appears well-developed and well-nourished. No distress. He is obese.   HENT:   Head: Normocephalic.   Nose: Nose normal.   Mouth/Throat: Oropharynx is clear and moist.   Neck: Normal range of motion. Neck supple.   Cardiovascular: Normal rate, regular rhythm and normal heart sounds.   Pulmonary/Chest: Normal expansion and symmetric chest wall expansion. He has no wheezes. He has no rhonchi. He has rales.   Abdominal: Soft. Bowel sounds are normal. He exhibits no distension. There is no tenderness.   Musculoskeletal: Normal range of motion. He exhibits no edema.   Lymphadenopathy: No supraclavicular adenopathy is present.     He has no cervical adenopathy.   Neurological: He is alert and oriented to person, place, and time.   Skin: Skin is warm and dry. He is not diaphoretic.   Psychiatric: He has a normal mood and affect. His behavior is normal.   Nursing note and vitals reviewed.    Personal Diagnostic Review  PFTs 5/2019: No obstruction, moderate restriction (TLC 61%), and mildly reduced DLCO (69%).    Assessment:       1. Restrictive lung disease    2. Dyspnea on exertion    3. Post-nasal drip    4. Seasonal allergic rhinitis, unspecified trigger        Outpatient Encounter Medications as of 6/21/2019   Medication Sig Dispense Refill    acyclovir (ZOVIRAX) 800 MG Tab Take 1 tablet (800 mg total) by mouth 2 (two) times daily. 180 tablet 6    albuterol (PROVENTIL/VENTOLIN HFA) 90 mcg/actuation inhaler Inhale 2 puffs into the lungs every 6 (six) hours as needed for Wheezing. Rescue 18 g 0    apixaban (ELIQUIS) 5 mg Tab Take 1 tablet (5 mg " total) by mouth 2 (two) times daily. 60 tablet 11    artificial tears (ISOPTO TEARS) 0.5 % ophthalmic solution Place 1 drop into both eyes as needed. (Patient taking differently: Place 1 drop into both eyes as needed (for dry eyes). )      atorvastatin (LIPITOR) 40 MG tablet Take 1 tablet (40 mg total) by mouth once daily. Please make a follow up visit with your doctor for more refills of this medication 120 tablet 0    azithromycin (Z-JESSICA) 250 MG tablet Take 2 tablets by mouth on day 1; Take 1 tablet by mouth on days 2-5 6 tablet 0    CYANOCOBALAMIN, VITAMIN B-12, (VITAMIN B-12 ORAL) Take 2,500 mcg by mouth once daily.      erythromycin (ROMYCIN) ophthalmic ointment Place into both eyes 3 (three) times daily. 1 g 1    gabapentin (NEURONTIN) 300 MG capsule Take 1 capsule (300 mg total) by mouth 2 (two) times daily. (Patient taking differently: Take 300 mg by mouth 2 (two) times daily as needed. ) 60 capsule 11    melatonin 5 mg Tab Take 10 mg by mouth nightly.      multivitamin (MULTIPLE VITAMINS) per tablet Take 1 tablet by mouth once daily.      olmesartan (BENICAR) 20 MG tablet Take 1 tablet (20 mg total) by mouth once daily. (replaces ramipril for blood pressure) 30 tablet 5    oxyCODONE-acetaminophen (PERCOCET) 5-325 mg per tablet Take 1 tablet by mouth every 4 to 6 hours as needed for Pain. 20 tablet 0    prednisoLONE acetate (PRED FORTE) 1 % DrpS Place 1 drop into both eyes 4 (four) times daily. 4x/day for 1 wk, 3x/day for 4 days, 2x/day for 4 days, 1x/day for 4 days then STOP 10 mL 0    sildenafil (VIAGRA) 100 MG tablet Take 1 tablet (100 mg total) by mouth daily as needed for Erectile Dysfunction. 10 tablet 11    tamsulosin (FLOMAX) 0.4 mg Cap Take 1 capsule (0.4 mg total) by mouth once daily. 90 capsule 3    venlafaxine (EFFEXOR) 75 MG tablet TAKE 2 TABLETS BY MOUTH TWICE DAILY 360 tablet 0    vitamin D 1000 units Tab Take 1 tablet (1,000 Units total) by mouth once daily.       [DISCONTINUED] azelastine (ASTELIN) 137 mcg (0.1 %) nasal spray 2 sprays (274 mcg total) by Nasal route 2 (two) times daily. (Patient taking differently: 2 sprays by Nasal route 2 (two) times daily as needed. ) 30 mL 0    aspirin (ECOTRIN) 81 MG EC tablet Take 1 tablet (81 mg total) by mouth once daily.  0    azelastine (ASTELIN) 137 mcg (0.1 %) nasal spray 1 spray (137 mcg total) by Nasal route 2 (two) times daily. 30 mL 2    fluticasone propionate (FLONASE) 50 mcg/actuation nasal spray 1 spray (50 mcg total) by Each Nare route once daily. 1 Bottle 0    furosemide (LASIX) 20 MG tablet Take 1 tablet (20 mg total) by mouth once daily. (Patient taking differently: Take 20 mg by mouth daily as needed. ) 90 tablet 0    omeprazole (PRILOSEC) 20 MG capsule Take 1 capsule (20 mg total) by mouth daily as needed. 90 capsule 0     No facility-administered encounter medications on file as of 6/21/2019.      Orders Placed This Encounter   Procedures    CT Chest Without Contrast     Standing Status:   Future     Standing Expiration Date:   6/21/2020     Order Specific Question:   May the Radiologist modify the order per protocol to meet the clinical needs of the patient?     Answer:   Yes    Transthoracic echo (TTE) 2D with Color Flow     Standing Status:   Future     Standing Expiration Date:   6/21/2020     Order Specific Question:   Test type     Answer:   Color     Comments:   to look for TR jet for PASP measurement     Plan:     1. Dyspnea on Exertion  -No obstruction noted on PFTs, never-smoker, no significant occupational/environmental triggers or exposures. No rheumatological disease noted.  -Moderate restriction noted on PFTs with mild DLCO impairment.  -CT chest ordered for better characterization  -2D ECHO ordered given history of CAD  -Concern for patient's obesity possibly contributing as well (BMI 44.8)  -If CT chest is unremarkable, may need to consider CPET for better characterization of SOB.    2.  Allergic Rhinitis/PND  -Discussed using nasal sprays when trigger seasons start.  -NS nasal spray, Flonase daily, and Azelastine. Prescriptions sent to pharmacy.  -Discussed the benefit of daily anti-histamine as well.    Ashlee Fields MD  [facility] PCCM Fellow

## 2019-06-24 ENCOUNTER — PATIENT MESSAGE (OUTPATIENT)
Dept: FAMILY MEDICINE | Facility: CLINIC | Age: 74
End: 2019-06-24

## 2019-06-24 ENCOUNTER — CLINICAL SUPPORT (OUTPATIENT)
Dept: CARDIOLOGY | Facility: HOSPITAL | Age: 74
End: 2019-06-24
Attending: INTERNAL MEDICINE
Payer: MEDICARE

## 2019-06-24 DIAGNOSIS — Z95.818 STATUS POST PLACEMENT OF IMPLANTABLE LOOP RECORDER: ICD-10-CM

## 2019-06-24 DIAGNOSIS — I48.91 ATRIAL FIBRILLATION: ICD-10-CM

## 2019-06-24 PROCEDURE — 93299 CARDIAC DEVICE CHECK CHECK - REMOTE: CPT | Mod: HCNC

## 2019-06-25 ENCOUNTER — OFFICE VISIT (OUTPATIENT)
Dept: PULMONOLOGY | Facility: CLINIC | Age: 74
End: 2019-06-25
Payer: MEDICARE

## 2019-06-25 VITALS
DIASTOLIC BLOOD PRESSURE: 62 MMHG | HEIGHT: 73 IN | OXYGEN SATURATION: 94 % | HEART RATE: 90 BPM | BODY MASS INDEX: 41.75 KG/M2 | WEIGHT: 315 LBS | SYSTOLIC BLOOD PRESSURE: 130 MMHG

## 2019-06-25 DIAGNOSIS — R06.00 DYSPNEA, UNSPECIFIED TYPE: ICD-10-CM

## 2019-06-25 DIAGNOSIS — G47.33 OBSTRUCTIVE SLEEP APNEA ON CPAP: ICD-10-CM

## 2019-06-25 DIAGNOSIS — E66.01 MORBID OBESITY WITH BMI OF 40.0-44.9, ADULT: ICD-10-CM

## 2019-06-25 DIAGNOSIS — G47.01 INSOMNIA DUE TO MEDICAL CONDITION: ICD-10-CM

## 2019-06-25 DIAGNOSIS — G47.21 DELAYED SLEEP PHASE SYNDROME: ICD-10-CM

## 2019-06-25 PROBLEM — G47.00 INSOMNIA: Status: ACTIVE | Noted: 2019-06-25

## 2019-06-25 PROCEDURE — 3078F DIAST BP <80 MM HG: CPT | Mod: HCNC,CPTII,S$GLB, | Performed by: INTERNAL MEDICINE

## 2019-06-25 PROCEDURE — 3288F PR FALLS RISK ASSESSMENT DOCUMENTED: ICD-10-PCS | Mod: HCNC,CPTII,S$GLB, | Performed by: INTERNAL MEDICINE

## 2019-06-25 PROCEDURE — 99214 OFFICE O/P EST MOD 30 MIN: CPT | Mod: HCNC,S$GLB,, | Performed by: INTERNAL MEDICINE

## 2019-06-25 PROCEDURE — 99214 PR OFFICE/OUTPT VISIT, EST, LEVL IV, 30-39 MIN: ICD-10-PCS | Mod: HCNC,S$GLB,, | Performed by: INTERNAL MEDICINE

## 2019-06-25 PROCEDURE — 99999 PR PBB SHADOW E&M-EST. PATIENT-LVL IV: ICD-10-PCS | Mod: PBBFAC,HCNC,, | Performed by: INTERNAL MEDICINE

## 2019-06-25 PROCEDURE — 3075F PR MOST RECENT SYSTOLIC BLOOD PRESS GE 130-139MM HG: ICD-10-PCS | Mod: HCNC,CPTII,S$GLB, | Performed by: INTERNAL MEDICINE

## 2019-06-25 PROCEDURE — 3075F SYST BP GE 130 - 139MM HG: CPT | Mod: HCNC,CPTII,S$GLB, | Performed by: INTERNAL MEDICINE

## 2019-06-25 PROCEDURE — 1100F PTFALLS ASSESS-DOCD GE2>/YR: CPT | Mod: HCNC,CPTII,S$GLB, | Performed by: INTERNAL MEDICINE

## 2019-06-25 PROCEDURE — 3078F PR MOST RECENT DIASTOLIC BLOOD PRESSURE < 80 MM HG: ICD-10-PCS | Mod: HCNC,CPTII,S$GLB, | Performed by: INTERNAL MEDICINE

## 2019-06-25 PROCEDURE — 1100F PR PT FALLS ASSESS DOC 2+ FALLS/FALL W/INJURY/YR: ICD-10-PCS | Mod: HCNC,CPTII,S$GLB, | Performed by: INTERNAL MEDICINE

## 2019-06-25 PROCEDURE — 3288F FALL RISK ASSESSMENT DOCD: CPT | Mod: HCNC,CPTII,S$GLB, | Performed by: INTERNAL MEDICINE

## 2019-06-25 PROCEDURE — 99999 PR PBB SHADOW E&M-EST. PATIENT-LVL IV: CPT | Mod: PBBFAC,HCNC,, | Performed by: INTERNAL MEDICINE
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
## 2019-06-26 ENCOUNTER — PATIENT MESSAGE (OUTPATIENT)
Dept: ELECTROPHYSIOLOGY | Facility: CLINIC | Age: 74
End: 2019-06-26

## 2019-06-26 ENCOUNTER — OFFICE VISIT (OUTPATIENT)
Dept: FAMILY MEDICINE | Facility: CLINIC | Age: 74
End: 2019-06-26
Payer: MEDICARE

## 2019-06-26 VITALS
DIASTOLIC BLOOD PRESSURE: 56 MMHG | TEMPERATURE: 98 F | HEART RATE: 86 BPM | BODY MASS INDEX: 41.75 KG/M2 | OXYGEN SATURATION: 97 % | WEIGHT: 315 LBS | HEIGHT: 73 IN | SYSTOLIC BLOOD PRESSURE: 114 MMHG

## 2019-06-26 DIAGNOSIS — Z23 NEED FOR TDAP VACCINATION: ICD-10-CM

## 2019-06-26 DIAGNOSIS — I48.0 PAROXYSMAL ATRIAL FIBRILLATION: ICD-10-CM

## 2019-06-26 DIAGNOSIS — G47.33 OBSTRUCTIVE SLEEP APNEA ON CPAP: ICD-10-CM

## 2019-06-26 DIAGNOSIS — I10 ESSENTIAL HYPERTENSION: ICD-10-CM

## 2019-06-26 DIAGNOSIS — E78.5 HYPERLIPIDEMIA, UNSPECIFIED HYPERLIPIDEMIA TYPE: ICD-10-CM

## 2019-06-26 DIAGNOSIS — R60.0 BILATERAL LEG EDEMA: ICD-10-CM

## 2019-06-26 DIAGNOSIS — C61 PROSTATE CANCER: ICD-10-CM

## 2019-06-26 DIAGNOSIS — G47.01 INSOMNIA DUE TO MEDICAL CONDITION: ICD-10-CM

## 2019-06-26 DIAGNOSIS — I73.9 PVD (PERIPHERAL VASCULAR DISEASE): ICD-10-CM

## 2019-06-26 DIAGNOSIS — Z23 NEED FOR SHINGLES VACCINE: ICD-10-CM

## 2019-06-26 DIAGNOSIS — R06.00 DYSPNEA, UNSPECIFIED TYPE: ICD-10-CM

## 2019-06-26 DIAGNOSIS — F33.0 MAJOR DEPRESSIVE DISORDER, RECURRENT EPISODE, MILD: ICD-10-CM

## 2019-06-26 DIAGNOSIS — Z00.00 ROUTINE MEDICAL EXAM: Primary | ICD-10-CM

## 2019-06-26 DIAGNOSIS — K21.9 GASTROESOPHAGEAL REFLUX DISEASE, ESOPHAGITIS PRESENCE NOT SPECIFIED: ICD-10-CM

## 2019-06-26 DIAGNOSIS — E66.01 MORBID OBESITY WITH BMI OF 40.0-44.9, ADULT: ICD-10-CM

## 2019-06-26 PROBLEM — M25.60 RESTRICTION OF JOINT MOTION: Status: RESOLVED | Noted: 2017-11-14 | Resolved: 2019-06-26

## 2019-06-26 PROBLEM — M67.01 HEEL CORD TIGHTNESS, RIGHT: Status: RESOLVED | Noted: 2017-11-14 | Resolved: 2019-06-26

## 2019-06-26 PROCEDURE — 3074F SYST BP LT 130 MM HG: CPT | Mod: HCNC,CPTII,S$GLB, | Performed by: INTERNAL MEDICINE

## 2019-06-26 PROCEDURE — 99999 PR PBB SHADOW E&M-EST. PATIENT-LVL III: CPT | Mod: PBBFAC,HCNC,, | Performed by: INTERNAL MEDICINE

## 2019-06-26 PROCEDURE — 3078F DIAST BP <80 MM HG: CPT | Mod: HCNC,CPTII,S$GLB, | Performed by: INTERNAL MEDICINE

## 2019-06-26 PROCEDURE — 99999 PR PBB SHADOW E&M-EST. PATIENT-LVL III: ICD-10-PCS | Mod: PBBFAC,HCNC,, | Performed by: INTERNAL MEDICINE

## 2019-06-26 PROCEDURE — 99397 PR PREVENTIVE VISIT,EST,65 & OVER: ICD-10-PCS | Mod: HCNC,S$GLB,, | Performed by: INTERNAL MEDICINE

## 2019-06-26 PROCEDURE — 3078F PR MOST RECENT DIASTOLIC BLOOD PRESSURE < 80 MM HG: ICD-10-PCS | Mod: HCNC,CPTII,S$GLB, | Performed by: INTERNAL MEDICINE

## 2019-06-26 PROCEDURE — 3074F PR MOST RECENT SYSTOLIC BLOOD PRESSURE < 130 MM HG: ICD-10-PCS | Mod: HCNC,CPTII,S$GLB, | Performed by: INTERNAL MEDICINE

## 2019-06-26 PROCEDURE — 99397 PER PM REEVAL EST PAT 65+ YR: CPT | Mod: HCNC,S$GLB,, | Performed by: INTERNAL MEDICINE

## 2019-06-26 RX ORDER — OLMESARTAN MEDOXOMIL 20 MG/1
20 TABLET ORAL DAILY
Qty: 90 TABLET | Refills: 1 | Status: SHIPPED | OUTPATIENT
Start: 2019-06-26 | End: 2019-12-18 | Stop reason: SDUPTHER

## 2019-06-27 ENCOUNTER — OFFICE VISIT (OUTPATIENT)
Dept: OPTOMETRY | Facility: CLINIC | Age: 74
End: 2019-06-27
Payer: MEDICARE

## 2019-06-27 DIAGNOSIS — H10.13 ALLERGIC CONJUNCTIVITIS OF BOTH EYES: Primary | ICD-10-CM

## 2019-06-27 DIAGNOSIS — H04.123 DRY EYE SYNDROME OF BOTH EYES: ICD-10-CM

## 2019-06-27 PROCEDURE — 92012 PR EYE EXAM, EST PATIENT,INTERMED: ICD-10-PCS | Mod: HCNC,S$GLB,, | Performed by: OPTOMETRIST

## 2019-06-27 PROCEDURE — 92012 INTRM OPH EXAM EST PATIENT: CPT | Mod: HCNC,S$GLB,, | Performed by: OPTOMETRIST

## 2019-06-27 PROCEDURE — 99999 PR PBB SHADOW E&M-EST. PATIENT-LVL III: CPT | Mod: PBBFAC,HCNC,, | Performed by: OPTOMETRIST

## 2019-06-27 PROCEDURE — 99999 PR PBB SHADOW E&M-EST. PATIENT-LVL III: ICD-10-PCS | Mod: PBBFAC,HCNC,, | Performed by: OPTOMETRIST

## 2019-06-27 NOTE — PROGRESS NOTES
HPI     Pt is in for f/u for Allergic Conjunctivitis OU   Has stop Pred forte drops  No symptoms today     Last edited by Lisa Daniels on 6/27/2019  1:24 PM. (History)        ROS     Negative for: Constitutional, Gastrointestinal, Neurological, Skin,   Genitourinary, Musculoskeletal, HENT, Endocrine, Cardiovascular, Eyes,   Respiratory, Psychiatric, Allergic/Imm, Heme/Lymph    Last edited by Pat Cueva, OD on 6/27/2019  1:47 PM. (History)        Assessment /Plan     For exam results, see Encounter Report.    Allergic conjunctivitis of both eyes    Dry eye syndrome of both eyes      1-2. S/sx resolved. Recommend Zaditor or Alaway bid OU and cool compresses to help soothe itching. Patient advised to RTC if condition gets worse. Cont ATs.

## 2019-06-29 ENCOUNTER — PATIENT OUTREACH (OUTPATIENT)
Dept: ADMINISTRATIVE | Facility: OTHER | Age: 74
End: 2019-06-29

## 2019-07-08 ENCOUNTER — PATIENT MESSAGE (OUTPATIENT)
Dept: FAMILY MEDICINE | Facility: CLINIC | Age: 74
End: 2019-07-08

## 2019-07-16 ENCOUNTER — TELEPHONE (OUTPATIENT)
Dept: PULMONOLOGY | Facility: CLINIC | Age: 74
End: 2019-07-16

## 2019-07-16 NOTE — TELEPHONE ENCOUNTER
----- Message from Anayeli Martin sent at 7/16/2019 10:50 AM CDT -----  Contact: self [phone]  .Needs Advice    Reason for call:        Communication Preference:phone     Additional Information:pt states he was suppose to have follow up test states he haven't heard anything from the doctor please call back to discuss

## 2019-07-16 NOTE — TELEPHONE ENCOUNTER
Mr Montgomery saw Dr Fields last month and she ordered a CT chest and  Echo test and to return  September 6th to see Dr Fredo Manrique. The CT and ECHO will be 7-23-19 at [facility] in [place], more convenient for Mr Montgomery. I left a message on patients telephone and I will mail him a appointment. Jenny MOSQUEDA.

## 2019-07-18 ENCOUNTER — PATIENT OUTREACH (OUTPATIENT)
Dept: ADMINISTRATIVE | Facility: OTHER | Age: 74
End: 2019-07-18

## 2019-07-18 ENCOUNTER — OFFICE VISIT (OUTPATIENT)
Dept: PODIATRY | Facility: CLINIC | Age: 74
End: 2019-07-18
Payer: MEDICARE

## 2019-07-18 VITALS — WEIGHT: 315 LBS | HEIGHT: 73 IN | BODY MASS INDEX: 41.75 KG/M2

## 2019-07-18 DIAGNOSIS — L84 PRE-ULCERATIVE CALLUSES: ICD-10-CM

## 2019-07-18 DIAGNOSIS — L90.9 PLANTAR FAT PAD ATROPHY: ICD-10-CM

## 2019-07-18 DIAGNOSIS — I87.8 VENOUS STASIS OF LOWER EXTREMITY: ICD-10-CM

## 2019-07-18 DIAGNOSIS — I73.9 PVD (PERIPHERAL VASCULAR DISEASE): Primary | ICD-10-CM

## 2019-07-18 PROCEDURE — 99213 PR OFFICE/OUTPT VISIT, EST, LEVL III, 20-29 MIN: ICD-10-PCS | Mod: HCNC,S$GLB,, | Performed by: PODIATRIST

## 2019-07-18 PROCEDURE — 1101F PR PT FALLS ASSESS DOC 0-1 FALLS W/OUT INJ PAST YR: ICD-10-PCS | Mod: HCNC,CPTII,S$GLB, | Performed by: PODIATRIST

## 2019-07-18 PROCEDURE — 99999 PR PBB SHADOW E&M-EST. PATIENT-LVL III: ICD-10-PCS | Mod: PBBFAC,HCNC,, | Performed by: PODIATRIST

## 2019-07-18 PROCEDURE — 1101F PT FALLS ASSESS-DOCD LE1/YR: CPT | Mod: HCNC,CPTII,S$GLB, | Performed by: PODIATRIST

## 2019-07-18 PROCEDURE — 99999 PR PBB SHADOW E&M-EST. PATIENT-LVL III: CPT | Mod: PBBFAC,HCNC,, | Performed by: PODIATRIST

## 2019-07-18 PROCEDURE — 99213 OFFICE O/P EST LOW 20 MIN: CPT | Mod: HCNC,S$GLB,, | Performed by: PODIATRIST

## 2019-07-18 NOTE — PROGRESS NOTES
Subjective:      Patient ID: Janusz Montgomery Jr. is a 74 y.o. male.    Chief Complaint: Foot Pain (PVD (PCP Dr Rm 6/26/19))        Janusz Montgomery Jr. is a 74 y.o. malewho presents to the clinic for evaluation and treatment of high risk feet. Janusz Montgomery Jr.   has a past medical history of NAT (acute kidney injury) (3/19/2017), ALLERGIC RHINITIS, Anemia, Anxiety, Basal cell carcinoma (10/19/2018), Chronic rhinitis (5/3/2013), Chronic rhinitis (5/3/2013), Coronary artery disease involving native coronary artery of native heart without angina pectoris s/p RCA stent, Cortical cataract of both eyes (3/18/2016), Delayed sleep phase syndrome (3/13/2019), Depression, Erectile dysfunction (3/24/2014), Erectile dysfunction (3/24/2014), Essential hypertension, GERD (gastroesophageal reflux disease) (7/25/2012), Gout, arthritis, Grade III open fracture of left tibia and fibula s/p ex-fix on 7/1/16 and ORIF of left tibia on 7/15 (7/6/2016), H/O: iritis, Helicobacter pylori (H. 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  The patient's chief complaint is left  foot callus. History of left foot and ankle ulcers. Patient's wife has been out of town and therefore he has not been caring for feet as instructed.  This patient has documented high risk feet requiring routine maintenance secondary to peripheral vascular disease.    PCP: Miguelina Weathers MD    Date Last Seen by PCP:   Chief Complaint   Patient presents with    Foot Pain     PVD (PCP Dr Weathers 6/26/19)         Current shoe gear: tennis shoe    Hemoglobin A1C   Date Value Ref Range Status   07/11/2016 5.3 4.5 - 6.2 % Final     Comment:     According to ADA guidelines, hemoglobin A1C <7.0% represents  optimal control in non-pregnant diabetic patients.  Different  metrics may apply to specific populations.   Standards of Medical Care in Diabetes - 2016.  For the purpose of screening for the presence of diabetes:  <5.7%     Consistent with the absence of diabetes  5.7-6.4%  Consistent with increasing risk for diabetes   (prediabetes)  >or=6.5%  Consistent with diabetes  Currently no consensus exists for use of hemoglobin A1C  for diagnosis of diabetes for children.     03/31/2015 5.3 4.5 - 6.2 % Final   03/03/2014 5.6 4.5 - 6.2 % Final         Current Outpatient Medications on File Prior to Visit   Medication Sig Dispense Refill    acyclovir (ZOVIRAX) 800 MG Tab Take 1 tablet (800 mg total) by mouth 2 (two) times daily. 180 tablet 6    albuterol (PROVENTIL/VENTOLIN HFA) 90 mcg/actuation inhaler Inhale 2 puffs into the lungs every 6 (six) hours as needed for Wheezing. Rescue 18 g 0    apixaban (ELIQUIS) 5 mg Tab Take 1 tablet (5 mg total) by mouth 2 (two) times daily. 180 tablet 4    artificial tears (ISOPTO TEARS) 0.5 % ophthalmic solution Place 1 drop into both eyes as needed. (Patient taking differently: Place 1 drop into both eyes as needed (for dry eyes). )      atorvastatin (LIPITOR) 40 MG tablet Take 1 tablet (40 mg total) by mouth once daily. Please make a follow up visit with your doctor for more refills  of this medication 120 tablet 0    azelastine (ASTELIN) 137 mcg (0.1 %) nasal spray 1 spray (137 mcg total) by Nasal route 2 (two) times daily. 30 mL 2    azithromycin (Z-JESSICA) 250 MG tablet Take 2 tablets by mouth on day 1; Take 1 tablet by mouth on days 2-5 6 tablet 0    CYANOCOBALAMIN, VITAMIN B-12, (VITAMIN B-12 ORAL) Take 2,500 mcg by mouth once daily.      erythromycin (ROMYCIN) ophthalmic ointment Place into both eyes 3 (three) times daily. 1 g 1    fluticasone propionate (FLONASE) 50 mcg/actuation nasal spray 1 spray (50 mcg total) by Each Nare route once daily. 1 Bottle 0    gabapentin (NEURONTIN) 300 MG capsule Take 1 capsule (300 mg total) by mouth 2 (two) times daily. (Patient taking differently: Take 300 mg by mouth 2 (two) times daily as needed. ) 60 capsule 11    melatonin 5 mg Tab Take 10 mg by mouth nightly.      multivitamin (MULTIPLE VITAMINS) per tablet Take 1 tablet by mouth once daily.      olmesartan (BENICAR) 20 MG tablet Take 1 tablet (20 mg total) by mouth once daily. (replaces ramipril for blood pressure) 90 tablet 1    oxyCODONE-acetaminophen (PERCOCET) 5-325 mg per tablet Take 1 tablet by mouth every 4 to 6 hours as needed for Pain. 20 tablet 0    sildenafil (VIAGRA) 100 MG tablet Take 1 tablet (100 mg total) by mouth daily as needed for Erectile Dysfunction. 10 tablet 11    tamsulosin (FLOMAX) 0.4 mg Cap Take 1 capsule (0.4 mg total) by mouth once daily. 90 capsule 3    venlafaxine (EFFEXOR) 75 MG tablet TAKE 2 TABLETS BY MOUTH TWICE DAILY 360 tablet 0    vitamin D 1000 units Tab Take 1 tablet (1,000 Units total) by mouth once daily.      aspirin (ECOTRIN) 81 MG EC tablet Take 1 tablet (81 mg total) by mouth once daily.  0    furosemide (LASIX) 20 MG tablet Take 1 tablet (20 mg total) by mouth once daily. (Patient taking differently: Take 20 mg by mouth daily as needed. ) 90 tablet 0    omeprazole (PRILOSEC) 20 MG capsule Take 1 capsule (20 mg total) by mouth daily as  needed. 90 capsule 0     No current facility-administered medications on file prior to visit.        Allergies   Allergen Reactions    Ciprofloxacin Rash     Diffuse pruritic morbilliform rash developed 3/15/2017 after dose of cipro; previously in 2/2017 he had rash/fevers after initiation of cipro    Zosyn [Piperacillin-Tazobactam] Anaphylaxis     Diffuse pruritic morbilliform rash developed 3/15/2017.  Then, 430am dose on 3/16 and rash worsened with SOB/tachypnea but no hypoxemia.     Bacitracin Itching and Rash     Violaceous rash in area of topical Tx.        Past Surgical History:   Procedure Laterality Date    APPLICATION-EXTERNAL FIXATION DEVICE, ankle Left 12/28/2016    [facility]    Cardiac stenting x2      CATARACT EXTRACTION W/  INTRAOCULAR LENS IMPLANT Right 3/29/2016    Dr. Conteh    CATARACT EXTRACTION W/  INTRAOCULAR LENS IMPLANT Left 4/12/2016        DEBRIDEMENT-FOOT Left 4/13/2017    [facility]    EXTERNAL FIXATION TIBIAL FRACTURE Left 07/01/2016    FULL THICKNESS SKIN GRAFT from left shoulder Left 12/8/2015    [facility]t [facility] at [facility]    INSERTION OF IMPLANTABLE LOOP RECORDER  04/07/2017    INSERTION, RADIOACTIVE SEED, PROSTATE N/A 8/8/2018    Performed [facility] Bipin Thompson [facility] at [facility]    INSERTION-INTRAOCULAR LENS (IOL) Left 4/12/2016    [facility] Pool [facility]h [facility] at [facility]    INSERTION-INTRAOCULAR LENS (IOL) Right 3/29/2016    [facility] Pool [facility]h [facility] at [facility]    MAPPING-ULTRASOUND  6/13/2018    [facility]ipin Thompson [facility] at [facility]    OPEN REDUCTION INTERNAL [id number] Left 4/13/2017    [facility]    OPEN REDUCTION INTERNAL FIXATION-TIBIA Left 7/15/2016    [facility]    ORIF TIBIA  FRACTURE Left 07/15/2016    PHACOEMULSIFICATION-ASPIRATION-CATARACT Left 4/12/2016    [facility]    PHACOEMULSIFICATION-ASPIRATION-CATARACT Right 3/29/2016    Performed [facility] at [facility]    REDUCTION-CLOSED-TIBIA Left 12/28/2016    [facility] at [facility]    REMOVAL OF EXTERNAL FIXATION DEVICE Left 7/15/2016    [facility] at [facility]    REMOVAL OF EXTERNAL FIXATION DEVICE, LEFT ANKLE C ARM Left 4/13/2017    [facility]    REMOVAL-HARDWARE-LEG Left 4/13/2017    Per[facility] at [facility] OR [facility]    REPAIR-MOHS DEFECT Basal Cell Left Lateral Eyebrow Left 12/8/2015    Performed [facility]t [facility] at [facility] OR [facility]    Squamous cell cancer removal x3 with Mohs surgery      TONSILLECTOMY      TOTAL KNEE ARTHROPLASTY  10/2012    trus/bx         Family History   Problem Relation Age of Onset    Skin cancer Father     Lung cancer Father     Cancer Father         smoker,     Alzheimer's disease Mother     Hypertension Mother     Cancer Sister         colon, lung cancer     No Known Problems Sister     Cancer Brother         skin cancer, polypectomy     Peripheral vascular disease Unknown     No Known Problems Maternal Aunt     No Known Problems Maternal Uncle     No Known Problems Paternal Aunt     No Known Problems Paternal Uncle     No Known Problems Maternal Grandmother     No Known Problems Maternal Grandfather     No Known Problems Paternal Grandmother     No Known Problems Paternal Grandfather     Melanoma Neg Hx     Psoriasis Neg Hx     Lupus Neg Hx     Eczema Neg Hx     Amblyopia Neg Hx     Blindness Neg Hx     Cataracts Neg Hx     Diabetes Neg Hx     Glaucoma Neg Hx     Macular degeneration Neg Hx     Retinal detachment Neg Hx     Strabismus Neg Hx     Stroke Neg Hx      Thyroid disease Neg Hx     Acne Neg Hx        Social History     Socioeconomic History    Marital status:      Spouse name: Not on file    Number of children: Not on file    Years of education: Not on file    Highest education level: Not on file   Occupational History    Occupation: Retired    Social Needs    Financial resource strain: Not on file    Food insecurity:     Worry: Not on file     Inability: Not on file    Transportation needs:     Medical: Not on file     Non-medical: Not on file   Tobacco Use    Smoking status: Never Smoker    Smokeless tobacco: Never Used   Substance and Sexual Activity    Alcohol use: Yes     Comment: occasionally    Drug use: No    Sexual activity: Yes   Lifestyle    Physical activity:     Days per week: Not on file     Minutes per session: Not on file    Stress: Not on file   Relationships    Social connections:     Talks on phone: Not on file     Gets together: Not on file     Attends Synagogue service: Not on file     Active member of club or organization: Not on file     Attends meetings of clubs or organizations: Not on file     Relationship status: Not on file   Other Topics Concern    Not on file   Social History Narrative    Not on file       Review of Systems   Constitution: Negative for chills and fever.   Cardiovascular: Negative for claudication and leg swelling.   Respiratory: Negative for cough and shortness of breath.    Skin: Positive for dry skin and poor wound healing. Negative for itching and rash.   Musculoskeletal: Positive for arthritis, joint pain, joint swelling and myalgias. Negative for falls and muscle weakness.   Gastrointestinal: Negative for diarrhea, nausea and vomiting.   Neurological: Positive for paresthesias. Negative for numbness, tremors and weakness.   Psychiatric/Behavioral: Negative for altered mental status and hallucinations.         Objective:       Vitals:    07/18/19 1441   Weight: (!) 157.9 kg (348 lb)  "  Height: 6' 1" (1.854 m)   PainSc: 0-No pain       Physical Exam   Constitutional:  Non-toxic appearance. He does not have a sickly appearance. No distress.   Pt. is well-developed, well-nourished, appears stated age, in no acute distress, alert and oriented x 3. No evidence of depression, anxiety, or agitation. Calm, cooperative, and communicative. Appropriate interactions and affect.   Cardiovascular:   Pulses:       Dorsalis pedis pulses are 2+ on the right side, and 2+ on the left side.        Posterior tibial pulses are 1+ on the right side, and 1+ on the left side.   There is decreased digital hair. Skin is atrophic, slightly hyperpigmented, and pitting edema nora (L>>R)     Pulmonary/Chest: No respiratory distress.   Musculoskeletal:        Right ankle: He exhibits swelling. No tenderness. No lateral malleolus, no medial malleolus, no AITFL, no CF ligament, no head of 5th metatarsal and no proximal fibula tenderness found. Achilles tendon exhibits no pain, no defect and normal Zabala's test results.        Left ankle: He exhibits swelling. No tenderness. No lateral malleolus, no medial malleolus, no AITFL, no CF ligament and no posterior TFL tenderness found. Achilles tendon exhibits no pain, no defect and normal Zabala's test results.        Right foot: There is no tenderness and no bony tenderness.        Left foot: There is no tenderness and no bony tenderness.   Decreased stride, station of gait.  apropulsive toe off.  Increased angle and base of gait.    Patient has hammertoes of digits 2-5 bilateral partially reducible without symptom today.     There is equinus deformity bilateral. There is limitation of dorsiflexion with knees extended and with knees flexed.    Shoes reveals lateral heel counter wear bilateral in athletic shoes.        Lymphadenopathy:   No lymphatic streaking    Negative lymphadenopathy bilateral popliteal fossa and tarsal tunnel.     Neurological:   [place] 5.07 " monofilament is intact bilateral feet. Sharp/dull sensation is also intact Bilateral feet. Proprioception is grossly intact. Vibratory sensation intact (pt able to sense vibration stop within 3-5 seconds)     Skin: Skin is warm and dry. Lesion noted. No bruising, no burn, no laceration and no rash noted. He is not diaphoretic. No cyanosis or erythema. No pallor. Nails show no clubbing.   Ulcer location: medial left ankle  Signs of infection: none  Drainage: none  Purulence: no  Crepitus/fluctuance: no  Periwound: Reddened  Base: thin epithelial itssue  Depth: skin  Probe to bone: no      Ulcer location: posterior left heel  Signs of infection:none  Drainage:  None  Periwound: intact  Base: thin epithelial with bleeding within layers     Psychiatric: His mood appears not anxious. His affect is not inappropriate. His speech is not slurred. He is not combative. He is communicative. He is attentive.   Nursing note reviewed.        Assessment:       Encounter Diagnoses   Name Primary?    PVD (peripheral vascular disease) Yes    Venous stasis of lower extremity     Pre-ulcerative calluses     Plantar fat pad atrophy          Plan:       Janusz was seen today for foot pain.    Diagnoses and all orders for this visit:    PVD (peripheral vascular disease)    Venous stasis of lower extremity    Pre-ulcerative calluses    Plantar fat pad atrophy      I counseled the patient on his conditions, their implications and medical management.    The site is cleansed of foreign material as much as possible. The patient is alerted to watch for any signs of infection (redness, pus, pain, increased swelling or fever) and call if such occurs.    All wounds have has remained healed, I advised patient to check feet daily for signs of drainage or lesion re-opening     Discussed use of daily foot moisturizer to feet, avoiding the webspace's    - Tubigrips to BLE; patient is to elevate legs. When sleeping, place a pillow under lower  extremities. When sitting, support the legs so that they are level with the waist.    Follow-up:Patient is to return to the clinic in 9-12 weeks for follow-up but should call [facility] immediately if any signs of infection, such as fever, chills, sweats, increased redness or pain.    Short-term goals include maintaining good offloading and minimizing bioburden, promoting granulation and epithelialization to healing.  Long-term goals include keeping the wound healed by good offloading and medical management under the direction of internist.

## 2019-07-23 ENCOUNTER — HOSPITAL ENCOUNTER (OUTPATIENT)
Dept: RADIOLOGY | Facility: HOSPITAL | Age: 74
Discharge: HOME | End: 2019-07-23
Attending: STUDENT IN AN ORGANIZED HEALTH CARE EDUCATION/TRAINING PROGRAM
Payer: MEDICARE

## 2019-07-23 ENCOUNTER — HOSPITAL ENCOUNTER (OUTPATIENT)
Dept: CARDIOLOGY | Facility: HOSPITAL | Age: 74
Discharge: HOME | End: 2019-07-23
Attending: STUDENT IN AN ORGANIZED HEALTH CARE EDUCATION/TRAINING PROGRAM
Payer: MEDICARE

## 2019-07-23 DIAGNOSIS — J98.4 RESTRICTIVE LUNG DISEASE: ICD-10-CM

## 2019-07-23 DIAGNOSIS — R06.09 DYSPNEA ON EXERTION: ICD-10-CM

## 2019-07-23 LAB
AORTIC ROOT ANNULUS: 3.03 CM
AORTIC VALVE CUSP SEPERATION: 2.26 CM
ASCENDING AORTA: 3.56 CM
AV INDEX (PROSTH): 0.92
AV MEAN GRADIENT: 4 MMHG
AV PEAK GRADIENT: 7 MMHG
AV VALVE AREA: 4.31 CM2
AV VELOCITY RATIO: 0.87
CV ECHO LV RWT: 0.4 CM
DOP CALC AO PEAK VEL: 1.34 M/S
DOP CALC AO VTI: 30.66 CM
DOP CALC LVOT AREA: 4.7 CM2
DOP CALC LVOT DIAMETER: 2.44 CM
DOP CALC LVOT PEAK VEL: 1.17 M/S
DOP CALC LVOT STROKE VOLUME: 132.22 CM3
DOP CALCLVOT PEAK VEL VTI: 28.29 CM
E WAVE DECELERATION TIME: 188.37 MSEC
E/A RATIO: 1.91
E/E' RATIO: 14.7 M/S
ECHO LV POSTERIOR WALL: 1.18 CM (ref 0.6–1.1)
FRACTIONAL SHORTENING: 33 % (ref 28–44)
INTERVENTRICULAR SEPTUM: 1.46 CM (ref 0.6–1.1)
IVRT: 0.11 MSEC
LA MAJOR: 5.49 CM
LA MINOR: 5.34 CM
LA WIDTH: 4.02 CM
LEFT ATRIUM SIZE: 4.72 CM
LEFT ATRIUM VOLUME: 87.32 CM3
LEFT INTERNAL DIMENSION IN SYSTOLE: 3.94 CM (ref 2.1–4)
LEFT VENTRICLE DIASTOLIC VOLUME: 169.84 ML
LEFT VENTRICLE SYSTOLIC VOLUME: 67.53 ML
LEFT VENTRICULAR INTERNAL DIMENSION IN DIASTOLE: 5.85 CM (ref 3.5–6)
LEFT VENTRICULAR MASS: 343.18 G
LV LATERAL E/E' RATIO: 13.36 M/S
LV SEPTAL E/E' RATIO: 16.33 M/S
MV PEAK A VEL: 0.77 M/S
MV PEAK E VEL: 1.47 M/S
PISA TR MAX VEL: 2.49 M/S
PV PEAK VELOCITY: 1.67 CM/S
RA MAJOR: 5.07 CM
RA PRESSURE: 8 MMHG
RA WIDTH: 3.31 CM
RIGHT VENTRICULAR END-DIASTOLIC DIMENSION: 3.06 CM
RV TISSUE DOPPLER FREE WALL SYSTOLIC VELOCITY 1 (APICAL 4 CHAMBER VIEW): 13.78 CM/S
SINUS: 3.84 CM
STJ: 2.91 CM
TDI LATERAL: 0.11 M/S
TDI SEPTAL: 0.09 M/S
TDI: 0.1 M/S
TR MAX PG: 25 MMHG
TRICUSPID ANNULAR PLANE SYSTOLIC EXCURSION: 2.99 CM
TV REST PULMONARY ARTERY PRESSURE: 33 MMHG

## 2019-07-23 PROCEDURE — 93306 TTE W/DOPPLER COMPLETE: CPT | Mod: HCNC

## 2019-07-23 PROCEDURE — 71250 CT THORAX DX C-: CPT | Mod: 26,HCNC,, | Performed by: RADIOLOGY

## 2019-07-23 PROCEDURE — 71250 CT THORAX DX C-: CPT | Mod: TC,HCNC

## 2019-07-23 PROCEDURE — 93306 TTE W/DOPPLER COMPLETE: CPT | Mod: 26,HCNC,, | Performed by: INTERNAL MEDICINE

## 2019-07-23 PROCEDURE — 93306 TRANSTHORACIC ECHO (TTE) COMPLETE (CUPID ONLY): ICD-10-PCS | Mod: 26,HCNC,, | Performed by: INTERNAL MEDICINE

## 2019-07-23 PROCEDURE — 71250 CT CHEST WITHOUT CONTRAST: ICD-10-PCS | Mod: 26,HCNC,, | Performed by: RADIOLOGY

## 2019-07-24 ENCOUNTER — PATIENT OUTREACH (OUTPATIENT)
Dept: OTHER | Facility: OTHER | Age: 74
End: 2019-07-24

## 2019-07-24 ENCOUNTER — CLINICAL SUPPORT (OUTPATIENT)
Dept: CARDIOLOGY | Facility: HOSPITAL | Age: 74
End: 2019-07-24
Attending: INTERNAL MEDICINE
Payer: MEDICARE

## 2019-07-24 DIAGNOSIS — I48.91 ATRIAL FIBRILLATION: ICD-10-CM

## 2019-07-24 DIAGNOSIS — Z95.818 STATUS POST PLACEMENT OF IMPLANTABLE LOOP RECORDER: ICD-10-CM

## 2019-07-24 PROCEDURE — 93299 CARDIAC DEVICE CHECK CHECK - REMOTE: CPT | Mod: HCNC

## 2019-07-24 NOTE — PROGRESS NOTES
Last 5 Patient Entered Readings                                      Current 30 Day Average: 148/63     Recent Readings 7/10/2019 7/3/2019 6/21/2019 6/14/2019 6/12/2019    SBP (mmHg) 141 154 123 132 138    DBP (mmHg) 62 64 61 70 59    Pulse 72 78 82 98 89        Left message requesting call back  Office BP 6/26/19 114/56 mmHg  Review cuff size, measurement technique, and medication adherence

## 2019-08-12 ENCOUNTER — CLINICAL SUPPORT (OUTPATIENT)
Dept: CARDIOLOGY | Facility: HOSPITAL | Age: 74
End: 2019-08-12
Payer: MEDICARE

## 2019-08-12 ENCOUNTER — OFFICE VISIT (OUTPATIENT)
Dept: OTOLARYNGOLOGY | Facility: CLINIC | Age: 74
End: 2019-08-12
Payer: MEDICARE

## 2019-08-12 VITALS — TEMPERATURE: 99 F | SYSTOLIC BLOOD PRESSURE: 121 MMHG | DIASTOLIC BLOOD PRESSURE: 56 MMHG | HEART RATE: 70 BPM

## 2019-08-12 DIAGNOSIS — Z95.818 PRESENCE OF OTHER CARDIAC IMPLANTS AND GRAFTS: ICD-10-CM

## 2019-08-12 DIAGNOSIS — Z97.4 WEARS HEARING AID IN BOTH EARS: ICD-10-CM

## 2019-08-12 DIAGNOSIS — H61.21 IMPACTED CERUMEN OF RIGHT EAR: Primary | ICD-10-CM

## 2019-08-12 DIAGNOSIS — H93.8X1 SENSATION OF PLUGGED EAR ON RIGHT SIDE: ICD-10-CM

## 2019-08-12 PROCEDURE — 3074F SYST BP LT 130 MM HG: CPT | Mod: HCNC,CPTII,S$GLB, | Performed by: OTOLARYNGOLOGY

## 2019-08-12 PROCEDURE — 1101F PT FALLS ASSESS-DOCD LE1/YR: CPT | Mod: HCNC,CPTII,S$GLB, | Performed by: OTOLARYNGOLOGY

## 2019-08-12 PROCEDURE — 3078F PR MOST RECENT DIASTOLIC BLOOD PRESSURE < 80 MM HG: ICD-10-PCS | Mod: HCNC,CPTII,S$GLB, | Performed by: OTOLARYNGOLOGY

## 2019-08-12 PROCEDURE — 3074F PR MOST RECENT SYSTOLIC BLOOD PRESSURE < 130 MM HG: ICD-10-PCS | Mod: HCNC,CPTII,S$GLB, | Performed by: OTOLARYNGOLOGY

## 2019-08-12 PROCEDURE — 99999 PR PBB SHADOW E&M-EST. PATIENT-LVL IV: CPT | Mod: PBBFAC,HCNC,, | Performed by: OTOLARYNGOLOGY

## 2019-08-12 PROCEDURE — 69210 REMOVE IMPACTED EAR WAX UNI: CPT | Mod: HCNC,S$GLB,, | Performed by: OTOLARYNGOLOGY

## 2019-08-12 PROCEDURE — 99212 PR OFFICE/OUTPT VISIT, EST, LEVL II, 10-19 MIN: ICD-10-PCS | Mod: 25,HCNC,S$GLB, | Performed by: OTOLARYNGOLOGY

## 2019-08-12 PROCEDURE — 99212 OFFICE O/P EST SF 10 MIN: CPT | Mod: 25,HCNC,S$GLB, | Performed by: OTOLARYNGOLOGY

## 2019-08-12 PROCEDURE — 99999 PR PBB SHADOW E&M-EST. PATIENT-LVL IV: ICD-10-PCS | Mod: PBBFAC,HCNC,, | Performed by: OTOLARYNGOLOGY

## 2019-08-12 PROCEDURE — 3078F DIAST BP <80 MM HG: CPT | Mod: HCNC,CPTII,S$GLB, | Performed by: OTOLARYNGOLOGY

## 2019-08-12 PROCEDURE — 69210 PR REMOVAL IMPACTED CERUMEN REQUIRING INSTRUMENTATION, UNILATERAL: ICD-10-PCS | Mod: HCNC,S$GLB,, | Performed by: OTOLARYNGOLOGY

## 2019-08-12 PROCEDURE — 1101F PR PT FALLS ASSESS DOC 0-1 FALLS W/OUT INJ PAST YR: ICD-10-PCS | Mod: HCNC,CPTII,S$GLB, | Performed by: OTOLARYNGOLOGY

## 2019-08-12 NOTE — PATIENT INSTRUCTIONS
Veneer of debris/dried secretion/cerumen removed from deep AD eac/TM surface  Trial of white vinegar instillation into right ear canal  Prn may help  Stop straining at stool re: nasal/vascular sx  Avoid (dirty) water intrusion into right ear canal; Ventura's earplug use may help  RTC prn

## 2019-08-12 NOTE — PROGRESS NOTES
Subjective:       Patient ID: Janusz Montgomery Jr. is a 74 y.o. male.    Chief Complaint: Follow-up (ear cleaning)    HPI: Mr. Montgomery is a 74-year-old  male once again indicates a blocked sensation in his right ear is if there is a wax buildup there/other.    He was recently evaluated here 6/5/19 and treated by me for a blocked sensation in his right ear. A veneer of dry cerumen/dry secretion was removed from the posterior aspect of the right eardrum at that visit.  His right ear feels blocked up once again.  He admits to soaking in a bathtub and allowing water intrusion into his right ear canal.  He had indicated excision of a skin cancer from his nose with subsequent repair.    He parenthetically indicates straining at stool recently and subsequently experiencing left nasal pressure symptoms and a pulsation perception intranasally.  He is wondering if this sx  this relates to his previous nasal skin cancer/ excision procedure.  He completed an audiometric study in August 2018 a year ago which indicated his significant bilateral sloping mid and high frequency sensorineural hearing loss.  His right ear SRT score measured 30 decibels in his left ear SRT score measured 25 decibels.  There was a poorer  AD 52%  discrimination score compared to the AS 72%  discrimination score.    Past Medical History:   Diagnosis Date    NAT (acute kidney injury) 3/19/2017    ALLERGIC RHINITIS     Anemia     Anxiety     Basal cell carcinoma 10/19/2018    forehead and right medial shoulder    Chronic rhinitis 5/3/2013    Chronic rhinitis 5/3/2013    Coronary artery disease involving native coronary artery of native heart without angina pectoris s/p RCA stent     Cortical cataract of both eyes 3/18/2016    Delayed sleep phase syndrome 3/13/2019    Depression     Erectile dysfunction 3/24/2014    Erectile dysfunction 3/24/2014    Essential hypertension     GERD (gastroesophageal reflux disease) 7/25/2012    Gout,  "arthritis     Grade III open fracture of left tibia and fibula s/p ex-fix on 7/1/16 and ORIF of left tibia on 7/15 7/6/2016    H/O: iritis     Helicobacter pylori (H. pylori) infection     Treated    Herpes simplex keratoconjunctivitis 9/30/2015    - on acyclovir - followed by opthalmology, Dr. Uribe     Herpes simplex keratoconjunctivitis 9/30/2015    - on acyclovir - followed by opthalmology, Dr. Uribe     Hyperkalemia 2/28/2017    Hyperlipidemia     Hypogonadism male     Hypogonadism male     Mixed anxiety and depressive disorder     Morbid obesity     Obstructive sleep apnea on CPAP     CPAP    Osteoarthritis of left knee 7/25/2012    Paroxysmal atrial fibrillation 7/6/2016    Primary osteoarthritis of left knee 7/25/2012    Prominent aorta 1/25/2016    "RESULTS: THE HEART IS MILDLY ENLARGED WITH A SLIGHTLY PROMINENT AORTA" - Xray Chest PA & Lateral 12-     Prostate cancer 2/15/2016    - followed by urology, Dr. Young     Prostate cancer 2/15/2016    - followed by urology, Dr. 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    chest and forehead    Squamous cell cancer of skin of nose     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 7/6/2016    Type III open fracture of left tibia and fibula with routine healing 7/6/2016    Vitamin D deficiency disease     Vitreous detachment 3/18/2016    Allergies:  Ciprofloxacin, bacitracin, Zosyn    Review of Systems        Objective:     Blood pressure 121/56 pulse 70 temperature 98.5° height 6 ft 1 in weight 348 lb  General:  Alert and oriented gentleman in no acute distress  Both ears were examined under the microscope in the micro procedure room  A veneer of dry secretions is removed from the deep right ear canal and from the surface of the right eardrum with micro forceps.  Physical Exam   HENT: "   Ears:        Assessment:       1. Impacted cerumen of right ear    2. Sensation of plugged ear on right side    3. Wears hearing aid in both ears      4.    Left intranasal sx ( pressure/pulsation)  while strainig at stool   Plan:     Veneer of debris/dried secretion/cerumen removed from deep AD eac/TM surface  Trial of white vinegar instillation into right ear canal  Prn may help  Stop straining at stool re: nasal/vascular sx  Avoid (dirty) water intrusion into right ear canal; Ventura's earplug use may help  RTC prn

## 2019-08-16 ENCOUNTER — OFFICE VISIT (OUTPATIENT)
Dept: DERMATOLOGY | Facility: CLINIC | Age: 74
End: 2019-08-16
Payer: MEDICARE

## 2019-08-16 DIAGNOSIS — L57.0 AK (ACTINIC KERATOSIS): Primary | ICD-10-CM

## 2019-08-16 DIAGNOSIS — Z85.828 PERSONAL HISTORY OF OTHER MALIGNANT NEOPLASM OF SKIN: ICD-10-CM

## 2019-08-16 DIAGNOSIS — L82.1 SK (SEBORRHEIC KERATOSIS): ICD-10-CM

## 2019-08-16 PROCEDURE — 3288F FALL RISK ASSESSMENT DOCD: CPT | Mod: HCNC,CPTII,S$GLB, | Performed by: DERMATOLOGY

## 2019-08-16 PROCEDURE — 3288F PR FALLS RISK ASSESSMENT DOCUMENTED: ICD-10-PCS | Mod: HCNC,CPTII,S$GLB, | Performed by: DERMATOLOGY

## 2019-08-16 PROCEDURE — 17000 DESTRUCT PREMALG LESION: CPT | Mod: HCNC,S$GLB,, | Performed by: DERMATOLOGY

## 2019-08-16 PROCEDURE — 1100F PR PT FALLS ASSESS DOC 2+ FALLS/FALL W/INJURY/YR: ICD-10-PCS | Mod: HCNC,CPTII,S$GLB, | Performed by: DERMATOLOGY

## 2019-08-16 PROCEDURE — 99213 OFFICE O/P EST LOW 20 MIN: CPT | Mod: 25,HCNC,S$GLB, | Performed by: DERMATOLOGY

## 2019-08-16 PROCEDURE — 17000 PR DESTRUCTION(LASER SURGERY,CRYOSURGERY,CHEMOSURGERY),PREMALIGNANT LESIONS,FIRST LESION: ICD-10-PCS | Mod: HCNC,S$GLB,, | Performed by: DERMATOLOGY

## 2019-08-16 PROCEDURE — 17003 DESTRUCT PREMALG LES 2-14: CPT | Mod: HCNC,S$GLB,, | Performed by: DERMATOLOGY

## 2019-08-16 PROCEDURE — 99999 PR PBB SHADOW E&M-EST. PATIENT-LVL II: CPT | Mod: PBBFAC,HCNC,, | Performed by: DERMATOLOGY

## 2019-08-16 PROCEDURE — 99213 PR OFFICE/OUTPT VISIT, EST, LEVL III, 20-29 MIN: ICD-10-PCS | Mod: 25,HCNC,S$GLB, | Performed by: DERMATOLOGY

## 2019-08-16 PROCEDURE — 99999 PR PBB SHADOW E&M-EST. PATIENT-LVL II: ICD-10-PCS | Mod: PBBFAC,HCNC,, | Performed by: DERMATOLOGY

## 2019-08-16 PROCEDURE — 17003 DESTRUCTION, PREMALIGNANT LESIONS; SECOND THROUGH 14 LESIONS: ICD-10-PCS | Mod: HCNC,S$GLB,, | Performed by: DERMATOLOGY

## 2019-08-16 PROCEDURE — 1100F PTFALLS ASSESS-DOCD GE2>/YR: CPT | Mod: HCNC,CPTII,S$GLB, | Performed by: DERMATOLOGY

## 2019-08-16 NOTE — PROGRESS NOTES
Subjective:       Patient ID:  Janusz Montgomery Jr. is a 74 y.o. male who presents for   Chief Complaint   Patient presents with    Skin Check     TBSE    Lesion     scalp     History of Present Illness: The patient presents for follow up of skin check.    The patient was last seen on: 1/2019 for cryosurgery to actinic keratoses which have resolved and bx of bcc x 2 left nasal bridge and left post ear - both excised by SSW  This is a high risk patient here to check for the development of new lesions.      Other skin complaints:   Patient complains of lesion(s)  Location: scalp  Duration: 7 months  Symptoms: scabs  Relieving factors/Previous treatments: none          Review of Systems   Constitutional: Negative for fever, chills and weight loss (wt # 360# - stable).   Skin: Positive for daily sunscreen use and activity-related sunscreen use ( rarely outdoors). Negative for itching, rash, recent sunburn and wears hat ( when outdoors for long periods of time).   Hematologic/Lymphatic: Bruises/bleeds easily (takes aspirin).        Objective:    Physical Exam   Constitutional: He appears well-developed and well-nourished. He is obese.  No distress.   HENT:   Mouth/Throat: Lips normal.    Eyes: Lids are normal.  No conjunctival no injection.   Cardiovascular: There is dependent edema (legs).     Lymphadenopathy:        Head (right side): No submental, no submandibular, no preauricular, no posterior auricular and no occipital adenopathy present.        Head (left side): No submental, no submandibular, no preauricular, no posterior auricular and no occipital adenopathy present.     He has no axillary adenopathy.   Neurological: He is alert and oriented to person, place, and time. He is not disoriented.   Psychiatric: He has a normal mood and affect.   Skin:   Areas Examined (abnormalities noted in diagram):   Scalp / Hair Palpated and Inspected  Head / Face Inspection Performed  Neck Inspection Performed  Chest / Axilla  Inspection Performed  Back Inspection Performed  RUE Inspected  LUE Inspection Performed  LLE Inspection Performed  Nails and Digits Inspection Performed                   Diagram Legend     Erythematous scaling macule/papule c/w actinic keratosis       Vascular papule c/w angioma      Pigmented verrucoid papule/plaque c/w seborrheic keratosis      Yellow umbilicated papule c/w sebaceous hyperplasia      Irregularly shaped tan macule c/w lentigo     1-2 mm smooth white papules consistent with Milia      Movable subcutaneous cyst with punctum c/w epidermal inclusion cyst      Subcutaneous movable cyst c/w pilar cyst      Firm pink to brown papule c/w dermatofibroma      Pedunculated fleshy papule(s) c/w skin tag(s)      Evenly pigmented macule c/w junctional nevus     Mildly variegated pigmented, slightly irregular-bordered macule c/w mildly atypical nevus      Flesh colored to evenly pigmented papule c/w intradermal nevus       Pink pearly papule/plaque c/w basal cell carcinoma      Erythematous hyperkeratotic cursted plaque c/w SCC      Surgical scar with no sign of skin cancer recurrence      Open and closed comedones      Inflammatory papules and pustules      Verrucoid papule consistent consistent with wart     Erythematous eczematous patches and plaques     Dystrophic onycholytic nail with subungual debris c/w onychomycosis     Umbilicated papule    Erythematous-base heme-crusted tan verrucoid plaque consistent with inflamed seborrheic keratosis     Erythematous Silvery Scaling Plaque c/w Psoriasis     See annotation      Assessment / Plan:        AK (actinic keratosis)  Cryosurgery Procedure Note    Verbal consent from the patient is obtained including, but not limited to, risk of hypopigmentation/hyperpigmentation, scar, recurrence of lesion. The patient is aware of the precancerous quality and need for treatment of these lesions. Liquid nitrogen cryosurgery is applied to the 9 actinic keratoses, as detailed  in the physical exam, to produce a freeze injury. The patient is aware that blisters may form and is instructed on wound care with gentle cleansing and use of vaseline ointment to keep moist until healed. The patient is supplied a handout on cryosurgery and is instructed to call if lesions do not completely resolve.      SK (seborrheic keratosis)  These are benign inherited growths without a malignant potential. Reassurance given to patient. No treatment is necessary.       Personal history of other malignant neoplasm of skin  Area(s) of previous NMSC evaluated with no signs of recurrence.    Upper body skin examination performed today including at least 6 points as noted in physical examination. No lesions suspicious for malignancy noted.               Follow up in about 6 months (around 2/16/2020).

## 2019-08-16 NOTE — PATIENT INSTRUCTIONS

## 2019-08-20 ENCOUNTER — TELEPHONE (OUTPATIENT)
Dept: FAMILY MEDICINE | Facility: CLINIC | Age: 74
End: 2019-08-20

## 2019-08-20 DIAGNOSIS — Z12.11 ENCOUNTER FOR SCREENING COLONOSCOPY: Primary | ICD-10-CM

## 2019-08-20 NOTE — TELEPHONE ENCOUNTER
Please call patient. I received a message that their colonoscopy order has been cancelled as the department was unable to reach him to schedule. Please check with patient  to see why they did not schedule their colonoscopy.  We can do yearly fecal occult blood testing as an alternative, if desired.

## 2019-08-20 NOTE — TELEPHONE ENCOUNTER
"----- Message from Alize Campbell RN sent at 8/17/2019 11:22 AM CDT -----  Regarding: Cancellation of Order # [id number]  Order number [id number] for the procedure [facility] [GI[facility]]   has been canceled by Alize Campbell RN [[id number]]. This   procedure was ordered by Miguelina Weathers MD [[id number]] on Feb 18, 2019 for the patient Janusz HATFIELD Ulises Krueger [[id number]]. The reason for  cancellation was "None".  "

## 2019-08-21 ENCOUNTER — PATIENT OUTREACH (OUTPATIENT)
Dept: ADMINISTRATIVE | Facility: HOSPITAL | Age: 74
End: 2019-08-21

## 2019-08-21 ENCOUNTER — TELEPHONE (OUTPATIENT)
Dept: OTOLARYNGOLOGY | Facility: CLINIC | Age: 74
End: 2019-08-21

## 2019-08-21 NOTE — TELEPHONE ENCOUNTER
----- Message from Tamy Nobles sent at 8/21/2019  1:03 PM CDT -----  Contact: pt  Needs Advice    Reason for call: Pt is requesting to speak with Dr. Knutson nurse.        Communication Preference: Please give pt a call back at [phone]      Additional Information:n/a

## 2019-08-23 NOTE — TELEPHONE ENCOUNTER
Spoke with patient's wife and she said to schedule Colonoscopy at [facility].

## 2019-08-26 ENCOUNTER — PATIENT OUTREACH (OUTPATIENT)
Dept: OTHER | Facility: OTHER | Age: 74
End: 2019-08-26

## 2019-08-26 NOTE — PROGRESS NOTES
Last 5 Patient Entered Readings                                      Current 30 Day Average: 133/55     Recent Readings 8/11/2019 7/31/2019 7/10/2019 7/3/2019 6/21/2019    SBP (mmHg) 130 135 141 154 123    DBP (mmHg) 51 59 62 64 61    Pulse 77 70 72 78 82        Messaging patient regarding lack of readings.

## 2019-09-06 ENCOUNTER — OFFICE VISIT (OUTPATIENT)
Dept: PULMONOLOGY | Facility: CLINIC | Age: 74
End: 2019-09-06
Payer: MEDICARE

## 2019-09-06 ENCOUNTER — TELEPHONE (OUTPATIENT)
Dept: ENDOSCOPY | Facility: HOSPITAL | Age: 74
End: 2019-09-06

## 2019-09-06 VITALS
WEIGHT: 315 LBS | OXYGEN SATURATION: 95 % | BODY MASS INDEX: 41.75 KG/M2 | DIASTOLIC BLOOD PRESSURE: 68 MMHG | SYSTOLIC BLOOD PRESSURE: 120 MMHG | HEART RATE: 85 BPM | HEIGHT: 73 IN

## 2019-09-06 DIAGNOSIS — G47.33 OBSTRUCTIVE SLEEP APNEA: ICD-10-CM

## 2019-09-06 DIAGNOSIS — J98.4 RESTRICTIVE LUNG DISEASE: ICD-10-CM

## 2019-09-06 DIAGNOSIS — E66.01 MORBID OBESITY WITH BMI OF 40.0-44.9, ADULT: Primary | ICD-10-CM

## 2019-09-06 DIAGNOSIS — Z87.09 PERSONAL HISTORY OF ASBESTOSIS: ICD-10-CM

## 2019-09-06 PROCEDURE — 3074F PR MOST RECENT SYSTOLIC BLOOD PRESSURE < 130 MM HG: ICD-10-PCS | Mod: HCNC,CPTII,GC,S$GLB

## 2019-09-06 PROCEDURE — 99214 PR OFFICE/OUTPT VISIT, EST, LEVL IV, 30-39 MIN: ICD-10-PCS | Mod: HCNC,GC,S$GLB,

## 2019-09-06 PROCEDURE — 99214 OFFICE O/P EST MOD 30 MIN: CPT | Mod: HCNC,GC,S$GLB,

## 2019-09-06 PROCEDURE — 3288F FALL RISK ASSESSMENT DOCD: CPT | Mod: HCNC,CPTII,GC,S$GLB

## 2019-09-06 PROCEDURE — 99999 PR PBB SHADOW E&M-EST. PATIENT-LVL IV: CPT | Mod: PBBFAC,HCNC,GC,

## 2019-09-06 PROCEDURE — 3288F PR FALLS RISK ASSESSMENT DOCUMENTED: ICD-10-PCS | Mod: HCNC,CPTII,GC,S$GLB

## 2019-09-06 PROCEDURE — 3078F DIAST BP <80 MM HG: CPT | Mod: HCNC,CPTII,GC,S$GLB

## 2019-09-06 PROCEDURE — 1100F PR PT FALLS ASSESS DOC 2+ FALLS/FALL W/INJURY/YR: ICD-10-PCS | Mod: HCNC,CPTII,GC,S$GLB

## 2019-09-06 PROCEDURE — 1100F PTFALLS ASSESS-DOCD GE2>/YR: CPT | Mod: HCNC,CPTII,GC,S$GLB

## 2019-09-06 PROCEDURE — 3074F SYST BP LT 130 MM HG: CPT | Mod: HCNC,CPTII,GC,S$GLB

## 2019-09-06 PROCEDURE — 99999 PR PBB SHADOW E&M-EST. PATIENT-LVL IV: ICD-10-PCS | Mod: PBBFAC,HCNC,GC,

## 2019-09-06 PROCEDURE — 3078F PR MOST RECENT DIASTOLIC BLOOD PRESSURE < 80 MM HG: ICD-10-PCS | Mod: HCNC,CPTII,GC,S$GLB

## 2019-09-06 NOTE — TELEPHONE ENCOUNTER
Patient has order for colonoscopy and is currently taking eliquis. Per endoscopy protocol it must be held 2 days prior to procedure. Is it ok to hold? Please advise.

## 2019-09-06 NOTE — PROGRESS NOTES
Subjective:       Patient ID: Janusz Montgomery Jr. is a 74 y.o. male.    Chief Complaint: No chief complaint on file.    HPI   Mr. Montgomery is a 74 year old male with past medical history of HTN, CAD, a. Fib, CHARISMA (on CPAP), restrictive lung disease with mild decrease in DLCO, pulmonary nodules and pleural plaques (history of remote asbestos exposure), allergic rhinitis, and skin cancer.  When last seen he was referred for TTE and CT chest without contrast.  TTE showed normal EF and moderate eccentric left ventricular hypertrophy.   CT thorax shoed calcified pleural plaques with mild prominence of interstitial markings (consistent with asbestos exposure).     His home pulmonary meds are:  Albuterol PRN    Today he is in his usual state of health.  He states he gets short of breath with minor tasks.  He states his breathing is improved from his last visit, but remains labored with walking to his car.  He doesn't have cough, or sputum production.  No fevers.  He is compliant with his CPAP.  Never Smoker.  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  Sister also has lung cancer.      Review of Systems   Constitutional: Positive for weight loss, activity change, appetite change and fatigue. Negative for fever, chills, night sweats and weakness.   HENT: Positive for postnasal drip and congestion.    Respiratory: Positive for shortness of breath, previous hospitalization due to pulmonary problems, asthma nighttime symptoms, dyspnea on extertion and use of rescue inhaler. Negative for apnea, cough, hemoptysis, sputum production, choking, chest tightness, wheezing, orthopnea, somnolence and Paroxysmal Nocturnal Dyspnea.    Cardiovascular: Negative for chest pain, palpitations and leg swelling.   Musculoskeletal: Negative for arthralgias.   Gastrointestinal: Negative for abdominal pain and abdominal distention.   Psychiatric/Behavioral: Negative for confusion.          Objective:      Physical Exam   Constitutional: He is oriented to person, place, and time. He appears well-developed and well-nourished. No distress. He is obese.   HENT:   Head: Normocephalic.   Mouth/Throat: No oropharyngeal exudate. Mallampati Score: IV.   Neck: Normal range of motion. Neck supple. No JVD present.   Cardiovascular: Normal rate and regular rhythm. Exam reveals no friction rub.   No murmur heard.  Pulmonary/Chest: Symmetric chest wall expansion. No respiratory distress. He has no decreased breath sounds. Chest wall is not dull to percussion. He exhibits no tenderness.   Shortened expiratory phase consistent with known restriction.   Abdominal: Soft. Bowel sounds are normal.   obesity   Musculoskeletal: Normal range of motion. He exhibits no edema.   Neurological: He is alert and oriented to person, place, and time.   Skin: He is not diaphoretic.   Psychiatric: He has a normal mood and affect. His behavior is normal. Judgment and thought content normal.     Personal Diagnostic Review  TLC 61% predicted 6/21/2019    No flowsheet data found.      Assessment:       No diagnosis found.    Outpatient Encounter Medications as of 9/6/2019   Medication Sig Dispense Refill    acyclovir (ZOVIRAX) 800 MG Tab Take 1 tablet (800 mg total) by mouth 2 (two) times daily. 180 tablet 6    albuterol (PROVENTIL/VENTOLIN HFA) 90 mcg/actuation inhaler Inhale 2 puffs into the lungs every 6 (six) hours as needed for Wheezing. Rescue 18 g 0    apixaban (ELIQUIS) 5 mg Tab Take 1 tablet (5 mg total) by mouth 2 (two) times daily. 180 tablet 4    artificial tears (ISOPTO TEARS) 0.5 % ophthalmic solution Place 1 drop into both eyes as needed. (Patient taking differently: Place 1 drop into both eyes as needed (for dry eyes). )      aspirin (ECOTRIN) 81 MG EC tablet Take 1 tablet (81 mg total) by mouth once daily.  0    atorvastatin (LIPITOR) 40 MG tablet Take 1 tablet (40 mg total) by mouth once daily. Please make a  follow up visit with your doctor for more refills of this medication 120 tablet 0    azelastine (ASTELIN) 137 mcg (0.1 %) nasal spray 1 spray (137 mcg total) by Nasal route 2 (two) times daily. 30 mL 2    azithromycin (Z-JESSICA) 250 MG tablet Take 2 tablets by mouth on day 1; Take 1 tablet by mouth on days 2-5 6 tablet 0    CYANOCOBALAMIN, VITAMIN B-12, (VITAMIN B-12 ORAL) Take 2,500 mcg by mouth once daily.      erythromycin (ROMYCIN) ophthalmic ointment Place into both eyes 3 (three) times daily. 1 g 1    fluticasone propionate (FLONASE) 50 mcg/actuation nasal spray 1 spray (50 mcg total) by Each Nare route once daily. 1 Bottle 0    furosemide (LASIX) 20 MG tablet Take 1 tablet (20 mg total) by mouth once daily. (Patient taking differently: Take 20 mg by mouth daily as needed. ) 90 tablet 0    gabapentin (NEURONTIN) 300 MG capsule Take 1 capsule (300 mg total) by mouth 2 (two) times daily. (Patient taking differently: Take 300 mg by mouth 2 (two) times daily as needed. ) 60 capsule 11    melatonin 5 mg Tab Take 10 mg by mouth nightly.      multivitamin (MULTIPLE VITAMINS) per tablet Take 1 tablet by mouth once daily.      olmesartan (BENICAR) 20 MG tablet Take 1 tablet (20 mg total) by mouth once daily. (replaces ramipril for blood pressure) 90 tablet 1    omeprazole (PRILOSEC) 20 MG capsule Take 1 capsule (20 mg total) by mouth daily as needed. 90 capsule 0    oxyCODONE-acetaminophen (PERCOCET) 5-325 mg per tablet Take 1 tablet by mouth every 4 to 6 hours as needed for Pain. 20 tablet 0    sildenafil (VIAGRA) 100 MG tablet Take 1 tablet (100 mg total) by mouth daily as needed for Erectile Dysfunction. 10 tablet 11    tamsulosin (FLOMAX) 0.4 mg Cap Take 1 capsule (0.4 mg total) by mouth once daily. 90 capsule 3    venlafaxine (EFFEXOR) 75 MG tablet TAKE 2 TABLETS BY MOUTH TWICE DAILY 360 tablet 0    vitamin D 1000 units Tab Take 1 tablet (1,000 Units total) by mouth once daily.       No  facility-administered encounter medications on file as of 9/6/2019.      No orders of the defined types were placed in this encounter.      Plan:       1) moderate restrictive lung disease with mild reduction in DLCO  2) CHARISMA on CPAP  3) asbestosis exposure with CT findings compatible with pleural and interstitial disease.  4) sinus congestion  5) morbid obesity    - annual chest x-ray  - will order CPET at this time.  - continue CPAP QHS  - continue PRN albuterol  - continue flonase daily with PRN nasal saline.  - counseled extensively on weight loss.    Fredo Manrique MD  [facility] PGY5  [phone]

## 2019-09-09 ENCOUNTER — TELEPHONE (OUTPATIENT)
Dept: ENDOSCOPY | Facility: HOSPITAL | Age: 74
End: 2019-09-09

## 2019-09-09 NOTE — TELEPHONE ENCOUNTER
Luis Palacio MD  You 3 days ago      Sure    Routing comment       You routed conversation to Luis Palacio MD 3 days ago      You 3 days ago         Patient has order for colonoscopy and is currently taking eliquis. Per endoscopy protocol it must be held 2 days prior to procedure. Is it ok to hold? Please advise.         Documentation

## 2019-09-09 NOTE — TELEPHONE ENCOUNTER
Placed call to patient to schedule procedure. Left voicemail message with endoscopy  phone number.

## 2019-09-11 ENCOUNTER — TELEPHONE (OUTPATIENT)
Dept: ENDOSCOPY | Facility: HOSPITAL | Age: 74
End: 2019-09-11

## 2019-09-11 ENCOUNTER — CLINICAL SUPPORT (OUTPATIENT)
Dept: CARDIOLOGY | Facility: HOSPITAL | Age: 74
End: 2019-09-11
Payer: MEDICARE

## 2019-09-11 PROCEDURE — 93298 REM INTERROG DEV EVAL SCRMS: CPT | Mod: ,,, | Performed by: INTERNAL MEDICINE

## 2019-09-11 PROCEDURE — 93298 CARDIAC DEVICE CHECK - REMOTE: ICD-10-PCS | Mod: ,,, | Performed by: INTERNAL MEDICINE

## 2019-09-11 PROCEDURE — 93299 CARDIAC DEVICE CHECK - REMOTE: CPT | Performed by: INTERNAL MEDICINE

## 2019-09-11 NOTE — TELEPHONE ENCOUNTER
Ok to hold eliquis per cardiology. Placed a call to patient. Left voicemail message. Endoscopy  phone number provided.

## 2019-09-12 ENCOUNTER — PATIENT MESSAGE (OUTPATIENT)
Dept: PULMONOLOGY | Facility: CLINIC | Age: 74
End: 2019-09-12

## 2019-09-12 ENCOUNTER — TELEPHONE (OUTPATIENT)
Dept: PULMONOLOGY | Facility: CLINIC | Age: 74
End: 2019-09-12

## 2019-09-12 DIAGNOSIS — Z12.11 SPECIAL SCREENING FOR MALIGNANT NEOPLASMS, COLON: Primary | ICD-10-CM

## 2019-09-12 RX ORDER — POLYETHYLENE GLYCOL 3350, SODIUM SULFATE ANHYDROUS, SODIUM BICARBONATE, SODIUM CHLORIDE, POTASSIUM CHLORIDE 236; 22.74; 6.74; 5.86; 2.97 G/4L; G/4L; G/4L; G/4L; G/4L
4 POWDER, FOR SOLUTION ORAL ONCE
Qty: 4000 ML | Refills: 0 | Status: SHIPPED | OUTPATIENT
Start: 2019-09-12 | End: 2019-09-12

## 2019-09-12 NOTE — TELEPHONE ENCOUNTER
I spoke to Mr Montgomery and scheduled the CPX on 9/17/19 at 2:15pm. Pt verbalized understanding. I mailed appointment to pt as well.

## 2019-09-16 ENCOUNTER — PATIENT OUTREACH (OUTPATIENT)
Dept: ADMINISTRATIVE | Facility: OTHER | Age: 74
End: 2019-09-16

## 2019-09-17 ENCOUNTER — HOSPITAL ENCOUNTER (OUTPATIENT)
Dept: CARDIOLOGY | Facility: CLINIC | Age: 74
Discharge: HOME | End: 2019-09-17
Payer: MEDICARE

## 2019-09-17 VITALS
WEIGHT: 315 LBS | HEIGHT: 73 IN | HEART RATE: 78 BPM | DIASTOLIC BLOOD PRESSURE: 61 MMHG | BODY MASS INDEX: 41.75 KG/M2 | SYSTOLIC BLOOD PRESSURE: 116 MMHG

## 2019-09-17 DIAGNOSIS — J98.4 RESTRICTIVE LUNG DISEASE: ICD-10-CM

## 2019-09-17 LAB
CV STRESS BASE HR: 78 BPM
DIASTOLIC BLOOD PRESSURE: 61 MMHG
OHS CV CPX 1 MINUTE RECOVERY HEART RATE: 151 BPM
OHS CV CPX 85 PERCENT MAX PREDICTED HEART RATE MALE: 126
OHS CV CPX ANAEROBIC THRESHOLD DIASTOLIC BLOOD PRESSURE: 100 MMHG
OHS CV CPX ANAEROBIC THRESHOLD HEART RATE: 130
OHS CV CPX ANAEROBIC THRESHOLD RATE PRESSURE PRODUCT: NORMAL
OHS CV CPX ANAEROBIC THRESHOLD SYSTOLIC BLOOD PRESSURE: 165
OHS CV CPX DATA GRADE - AT: 2
OHS CV CPX DATA GRADE - PEAK: 3.9
OHS CV CPX DATA O2 SAT - PEAK: 91
OHS CV CPX DATA O2 SAT - REST: 95
OHS CV CPX DATA SPEED - AT: 2.1
OHS CV CPX DATA SPEED - PEAK: 2.6
OHS CV CPX DATA TIME - AT: 3.27
OHS CV CPX DATA TIME - PEAK: 5.82
OHS CV CPX DATA VE/VCO2 - AT: 37
OHS CV CPX DATA VE/VCO2 - PEAK: 37
OHS CV CPX DATA VE/VO2 - AT: 36
OHS CV CPX DATA VE/VO2 - PEAK: 47
OHS CV CPX DATA VO2 - AT: 8.3
OHS CV CPX DATA VO2 - PEAK: 10.5
OHS CV CPX DATA VO2 - REST: 2.9
OHS CV CPX FEV1/FVC: 0.77
OHS CV CPX FORCED EXPIRATORY VOLUME: 2.02
OHS CV CPX FORCED VITAL CAPACITY (FVC): 2.63
OHS CV CPX HIGHEST VO: 17.6
OHS CV CPX MAX PREDICTED HEART RATE: 148
OHS CV CPX MAXIMAL VOLUNTARY VENTILATION (MVV) PREDICTED: 80.8
OHS CV CPX MAXIMAL VOLUNTARY VENTILATION (MVV): 88
OHS CV CPX MAXIUMUM EXERCISE VENTILATION (VE MAX): 69.5
OHS CV CPX PATIENT AGE: 72
OHS CV CPX PATIENT HEIGHT IN: 73
OHS CV CPX PATIENT IS FEMALE AGE 11-19: 0
OHS CV CPX PATIENT IS FEMALE AGE GREATER THAN 19: 0
OHS CV CPX PATIENT IS FEMALE AGE LESS THAN 11: 0
OHS CV CPX PATIENT IS FEMALE: 0
OHS CV CPX PATIENT IS MALE AGE 11-25: 0
OHS CV CPX PATIENT IS MALE AGE GREATER THAN 25: 1
OHS CV CPX PATIENT IS MALE AGE LESS THAN 11: 0
OHS CV CPX PATIENT IS MALE GREATER THAN 18: 1
OHS CV CPX PATIENT IS MALE LESS THAN OR EQUAL TO 18: 0
OHS CV CPX PATIENT IS MALE: 1
OHS CV CPX PATIENT WEIGHT RETURNED IN OZ: 5408
OHS CV CPX PEAK DIASTOLIC BLOOD PRESSURE: 60 MMHG
OHS CV CPX PEAK HEAR RATE: 151 BPM
OHS CV CPX PEAK RATE PRESSURE PRODUCT: NORMAL
OHS CV CPX PEAK SYSTOLIC BLOOD PRESSURE: 176 MMHG
OHS CV CPX PERCENT BODY FAT: 33.1
OHS CV CPX PERCENT MAX PREDICTED HEART RATE ACHIEVED: 102
OHS CV CPX PREDICTED VO2: 17.6 ML/KG/MIN
OHS CV CPX RATE PRESSURE PRODUCT PRESENTING: 9048
OHS CV CPX REST PET CO2: 28
OHS CV CPX VE/VCO2 SLOPE: 29.6
STRESS ECHO POST EXERCISE DUR MIN: 5 MINUTES
STRESS ECHO POST EXERCISE DUR SEC: 49 SECONDS
STRESS ST DEPRESSION: 1 MM
SYSTOLIC BLOOD PRESSURE: 116 MMHG

## 2019-09-17 PROCEDURE — 94621 CARDIOPULMONARY EXERCISE TESTING (CUPID ONLY): ICD-10-PCS | Mod: HCNC,S$GLB,, | Performed by: INTERNAL MEDICINE

## 2019-09-17 PROCEDURE — 94621 CARDIOPULM EXERCISE TESTING: CPT | Mod: HCNC,S$GLB,, | Performed by: INTERNAL MEDICINE

## 2019-09-19 ENCOUNTER — OFFICE VISIT (OUTPATIENT)
Dept: PODIATRY | Facility: CLINIC | Age: 74
End: 2019-09-19
Payer: MEDICARE

## 2019-09-19 VITALS
HEIGHT: 73 IN | BODY MASS INDEX: 41.75 KG/M2 | WEIGHT: 315 LBS | SYSTOLIC BLOOD PRESSURE: 106 MMHG | HEART RATE: 82 BPM | DIASTOLIC BLOOD PRESSURE: 46 MMHG

## 2019-09-19 DIAGNOSIS — I87.8 VENOUS STASIS OF LOWER EXTREMITY: ICD-10-CM

## 2019-09-19 DIAGNOSIS — I73.9 PVD (PERIPHERAL VASCULAR DISEASE): Primary | ICD-10-CM

## 2019-09-19 DIAGNOSIS — I87.2 VENOUS STASIS DERMATITIS OF BOTH LOWER EXTREMITIES: ICD-10-CM

## 2019-09-19 DIAGNOSIS — Z87.2 HEALED ULCER OF LEFT FOOT ON EXAMINATION: ICD-10-CM

## 2019-09-19 DIAGNOSIS — L84 PRE-ULCERATIVE CALLUSES: ICD-10-CM

## 2019-09-19 DIAGNOSIS — L90.9 PLANTAR FAT PAD ATROPHY: ICD-10-CM

## 2019-09-19 PROCEDURE — 3288F FALL RISK ASSESSMENT DOCD: CPT | Mod: HCNC,CPTII,S$GLB, | Performed by: PODIATRIST

## 2019-09-19 PROCEDURE — 99999 PR PBB SHADOW E&M-EST. PATIENT-LVL III: ICD-10-PCS | Mod: PBBFAC,HCNC,, | Performed by: PODIATRIST

## 2019-09-19 PROCEDURE — 99213 PR OFFICE/OUTPT VISIT, EST, LEVL III, 20-29 MIN: ICD-10-PCS | Mod: HCNC,S$GLB,, | Performed by: PODIATRIST

## 2019-09-19 PROCEDURE — 1100F PTFALLS ASSESS-DOCD GE2>/YR: CPT | Mod: HCNC,CPTII,S$GLB, | Performed by: PODIATRIST

## 2019-09-19 PROCEDURE — 3288F PR FALLS RISK ASSESSMENT DOCUMENTED: ICD-10-PCS | Mod: HCNC,CPTII,S$GLB, | Performed by: PODIATRIST

## 2019-09-19 PROCEDURE — 1100F PR PT FALLS ASSESS DOC 2+ FALLS/FALL W/INJURY/YR: ICD-10-PCS | Mod: HCNC,CPTII,S$GLB, | Performed by: PODIATRIST

## 2019-09-19 PROCEDURE — 3078F PR MOST RECENT DIASTOLIC BLOOD PRESSURE < 80 MM HG: ICD-10-PCS | Mod: HCNC,CPTII,S$GLB, | Performed by: PODIATRIST

## 2019-09-19 PROCEDURE — 3078F DIAST BP <80 MM HG: CPT | Mod: HCNC,CPTII,S$GLB, | Performed by: PODIATRIST

## 2019-09-19 PROCEDURE — 3074F SYST BP LT 130 MM HG: CPT | Mod: HCNC,CPTII,S$GLB, | Performed by: PODIATRIST

## 2019-09-19 PROCEDURE — 99213 OFFICE O/P EST LOW 20 MIN: CPT | Mod: HCNC,S$GLB,, | Performed by: PODIATRIST

## 2019-09-19 PROCEDURE — 99999 PR PBB SHADOW E&M-EST. PATIENT-LVL III: CPT | Mod: PBBFAC,HCNC,, | Performed by: PODIATRIST

## 2019-09-19 PROCEDURE — 3074F PR MOST RECENT SYSTOLIC BLOOD PRESSURE < 130 MM HG: ICD-10-PCS | Mod: HCNC,CPTII,S$GLB, | Performed by: PODIATRIST

## 2019-09-19 NOTE — PROGRESS NOTES
Subjective:      Patient ID: Janusz Montgmoery Jr. is a 74 y.o. male.    Chief Complaint: Heel Pain (ov 6/26/19 Dr Jasiel HARRISON)        Janusz Montgomery Jr. is a 74 y.o. malewho presents to the clinic for evaluation and treatment of high risk feet. Janusz Montgomery Jr.   has a past medical history of NAT (acute kidney injury) (3/19/2017), ALLERGIC RHINITIS, Anemia, Anxiety, Basal cell carcinoma (10/19/2018), Basal cell carcinoma (01/09/2019), Chronic rhinitis (5/3/2013), Chronic rhinitis (5/3/2013), Coronary artery disease involving native coronary artery of native heart without angina pectoris s/p RCA stent, Cortical cataract of both eyes (3/18/2016), Delayed sleep phase syndrome (3/13/2019), Depression, Erectile dysfunction (3/24/2014), Erectile dysfunction (3/24/2014), Essential hypertension, GERD (gastroesophageal reflux disease) (7/25/2012), Gout, arthritis, Grade III open fracture of left tibia and fibula s/p ex-fix on 7/1/16 and ORIF of left tibia on 7/15 (7/6/2016), H/O: iritis, Helicobacter pylori (H. 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  The  patient's chief complaint is left foot callus. History of left foot and ankle ulcers.  This patient has documented high risk feet requiring routine maintenance secondary to peripheral vascular disease.    PCP: Miguelina Weathers MD    Date Last Seen by PCP:   Chief Complaint   Patient presents with    Heel Pain     ov 6/26/19 Dr Weathers PCP         Current shoe gear: tennis shoe    Hemoglobin A1C   Date Value Ref Range Status   07/11/2016 5.3 4.5 - 6.2 % Final     Comment:     According to ADA guidelines, hemoglobin A1C <7.0% represents  optimal control in non-pregnant diabetic patients.  Different  metrics may apply to specific populations.   Standards of Medical Care in Diabetes - 2016.  For the purpose of screening for the presence of diabetes:  <5.7%     Consistent with the absence of diabetes  5.7-6.4%  Consistent with increasing risk for diabetes   (prediabetes)  >or=6.5%  Consistent with diabetes  Currently no consensus exists for use of hemoglobin A1C  for diagnosis of diabetes for children.     03/31/2015 5.3 4.5 - 6.2 % Final   03/03/2014 5.6 4.5 - 6.2 % Final         Current Outpatient Medications on File Prior to Visit   Medication Sig Dispense Refill    acyclovir (ZOVIRAX) 800 MG Tab Take 1 tablet (800 mg total) by mouth 2 (two) times daily. 180 tablet 6    albuterol (PROVENTIL/VENTOLIN HFA) 90 mcg/actuation inhaler Inhale 2 puffs into the lungs every 6 (six) hours as needed for Wheezing. Rescue 18 g 0    apixaban (ELIQUIS) 5 mg Tab Take 1 tablet (5 mg total) by mouth 2 (two) times daily. 180 tablet 4    artificial tears (ISOPTO TEARS) 0.5 % ophthalmic solution Place 1 drop into both eyes as needed. (Patient taking differently: Place 1 drop into both eyes as needed (for dry eyes). )      atorvastatin (LIPITOR) 40 MG tablet Take 1 tablet (40 mg total) by mouth once daily. Please make a follow up visit with your doctor for more refills of this medication 120 tablet 0    azelastine (ASTELIN) 137 mcg  (0.1 %) nasal spray 1 spray (137 mcg total) by Nasal route 2 (two) times daily. 30 mL 2    azithromycin (Z-JESSICA) 250 MG tablet Take 2 tablets by mouth on day 1; Take 1 tablet by mouth on days 2-5 6 tablet 0    CYANOCOBALAMIN, VITAMIN B-12, (VITAMIN B-12 ORAL) Take 2,500 mcg by mouth once daily.      erythromycin (ROMYCIN) ophthalmic ointment Place into both eyes 3 (three) times daily. 1 g 1    fluticasone propionate (FLONASE) 50 mcg/actuation nasal spray 1 spray (50 mcg total) by Each Nare route once daily. 1 Bottle 0    gabapentin (NEURONTIN) 300 MG capsule Take 1 capsule (300 mg total) by mouth 2 (two) times daily. (Patient taking differently: Take 300 mg by mouth 2 (two) times daily as needed. ) 60 capsule 11    melatonin 5 mg Tab Take 10 mg by mouth nightly.      multivitamin (MULTIPLE VITAMINS) per tablet Take 1 tablet by mouth once daily.      olmesartan (BENICAR) 20 MG tablet Take 1 tablet (20 mg total) by mouth once daily. (replaces ramipril for blood pressure) 90 tablet 1    oxyCODONE-acetaminophen (PERCOCET) 5-325 mg per tablet Take 1 tablet by mouth every 4 to 6 hours as needed for Pain. 20 tablet 0    sildenafil (VIAGRA) 100 MG tablet Take 1 tablet (100 mg total) by mouth daily as needed for Erectile Dysfunction. 10 tablet 11    tamsulosin (FLOMAX) 0.4 mg Cap Take 1 capsule (0.4 mg total) by mouth once daily. 90 capsule 3    venlafaxine (EFFEXOR) 75 MG tablet TAKE 2 TABLETS BY MOUTH TWICE DAILY 360 tablet 0    vitamin D 1000 units Tab Take 1 tablet (1,000 Units total) by mouth once daily.      aspirin (ECOTRIN) 81 MG EC tablet Take 1 tablet (81 mg total) by mouth once daily.  0    furosemide (LASIX) 20 MG tablet Take 1 tablet (20 mg total) by mouth once daily. (Patient taking differently: Take 20 mg by mouth daily as needed. ) 90 tablet 0    omeprazole (PRILOSEC) 20 MG capsule Take 1 capsule (20 mg total) by mouth daily as needed. 90 capsule 0     No current facility-administered  medications on file prior to visit.        Allergies   Allergen Reactions    Ciprofloxacin Rash     Diffuse pruritic morbilliform rash developed 3/15/2017 after dose of cipro; previously in 2/2017 he had rash/fevers after initiation of cipro    Zosyn [Piperacillin-Tazobactam] Anaphylaxis     Diffuse pruritic morbilliform rash developed 3/15/2017.  Then, 430am dose on 3/16 and rash worsened with SOB/tachypnea but no hypoxemia.     Bacitracin Itching and Rash     Violaceous rash in area of topical Tx.        Past Surgical History:   Procedure Laterality Date    APPLICATION-EXTERNAL FIXATION DEVICE, ankle Left 12/28/2016    Performed [facility]    Cardiac stenting x2      CATARACT EXTRACTION W/  INTRAOCULAR LENS IMPLANT Right 3/29/2016    Dr. Conteh    CATARACT EXTRACTION W/  INTRAOCULAR LENS IMPLANT Left 4/12/2016        DEBRIDEMENT-FOOT Left 4/13/2017    Performed [facility] at [facility]    EXTERNAL FIXATION TIBIAL FRACTURE Left 07/01/2016    FULL THICKNESS SKIN GRAFT from left shoulder Left 12/8/2015    Performed by Colt Sosa [facility] at [facility]    INSERTION OF IMPLANTABLE LOOP RECORDER  04/07/2017    INSERTION, RADIOACTIVE SEED, PROSTATE N/A 8/8/2018    Performed by Bipin Thompson [facility] at [facility]ST [facility]    INSERTION-INTRAOCULAR LENS (IOL) Left 4/12/2016    Performed by Pool [facility]h [facility] at [facility]    INSERTION-INTRAOCULAR LENS (IOL) Right 3/29/2016    Performed by Pool [facility]h [facility] at [facility]    MAPPING-ULTRASOUND  6/13/2018    Performed by Bipin Thompson [facility] at [facility]ST [facility]    OPEN REDUCTION INTERNAL [id number] Left 4/13/2017    Performed by [facility] at [facility]ND [facility]    OPEN REDUCTION INTERNAL FIXATION-TIBIA Left 7/15/2016    Performed by [facility] at [facility]    ORIF TIBIA FRACTURE Left 07/15/2016     PHACOEMULSIFICATION-ASPIRATION-CATARACT Left 4/12/2016    [facility] Pool [facility]ST [facility]LR    PHACOEMULSIFICATION-ASPIRATION-CATARACT Right 3/29/2016    Performed by Pool [facility]h [facility] at [facility] OR [facility]ST [facility]LR    REDUCTION-CLOSED-TIBIA Left 12/28/2016    Per[facility] at [facility] OR [facility]ND [facility]LR    REMOVAL OF EXTERNAL FIXATION DEVICE Left 7/15/2016    Per[facility] at [facility] OR [facility]    REMOVAL OF EXTERNAL FIXATION DEVICE, LEFT ANKLE C ARM Left 4/13/2017    Performed [facility] at [facility] OR [facility]    REMOVAL-HARDWARE-LEG Left 4/13/2017    Performed [facility] at [facility]    REPAIR-MOHS DEFECT Basal Cell Left Lateral Eyebrow Left 12/8/2015    Per[facility]t [facility]osa [facility] at [facility] OR 2ND [facility]LR    Squamous cell cancer removal x3 with Mohs surgery      TONSILLECTOMY      TOTAL KNEE ARTHROPLASTY  10/2012    trus/bx         Family History   Problem Relation Age of Onset    Skin cancer Father     Lung cancer Father     Cancer Father         smoker,     Alzheimer's disease Mother     Hypertension Mother     Cancer Sister         colon, lung cancer     No Known Problems Sister     Cancer Brother         skin cancer, polypectomy     Peripheral vascular disease Unknown     No Known Problems Maternal Aunt     No Known Problems Maternal Uncle     No Known Problems Paternal Aunt     No Known Problems Paternal Uncle     No Known Problems Maternal Grandmother     No Known Problems Maternal Grandfather     No Known Problems Paternal Grandmother     No Known Problems Paternal Grandfather     Melanoma Neg Hx     Psoriasis Neg Hx     Lupus Neg Hx     Eczema Neg Hx     Amblyopia Neg Hx     Blindness Neg Hx     Cataracts Neg Hx     Diabetes Neg Hx     Glaucoma Neg Hx     Macular degeneration Neg Hx     Retinal detachment Neg Hx     Strabismus Neg Hx     Stroke Neg Hx     Thyroid disease Neg Hx      Acne Neg Hx        Social History     Socioeconomic History    Marital status:      Spouse name: Not on file    Number of children: Not on file    Years of education: Not on file    Highest education level: Not on file   Occupational History    Occupation: Retired    Social Needs    Financial resource strain: Not on file    Food insecurity:     Worry: Not on file     Inability: Not on file    Transportation needs:     Medical: Not on file     Non-medical: Not on file   Tobacco Use    Smoking status: Never Smoker    Smokeless tobacco: Never Used   Substance and Sexual Activity    Alcohol use: Yes     Comment: occasionally    Drug use: No    Sexual activity: Yes   Lifestyle    Physical activity:     Days per week: Not on file     Minutes per session: Not on file    Stress: Not on file   Relationships    Social connections:     Talks on phone: Not on file     Gets together: Not on file     Attends [employer] service: Not on file     Active member of club or organization: Not on file     Attends meetings of clubs or organizations: Not on file     Relationship status: Not on file   Other Topics Concern    Not on file   Social History Narrative    Not on file       Review of Systems   Constitution: Negative for chills and fever.   Cardiovascular: Negative for claudication and leg swelling.   Respiratory: Negative for cough and shortness of breath.    Skin: Positive for dry skin and poor wound healing. Negative for itching and rash.   Musculoskeletal: Positive for arthritis, joint pain, joint swelling and myalgias. Negative for falls and muscle weakness.   Gastrointestinal: Negative for diarrhea, nausea and vomiting.   Neurological: Positive for paresthesias. Negative for numbness, tremors and weakness.   Psychiatric/Behavioral: Negative for altered mental status and hallucinations.         Objective:       Vitals:    09/19/19 1447   BP: (!) 106/46   Pulse: 82   Weight: (!) 152 kg (335 lb)   Height:  "6' 1" (1.854 m)   PainSc: 0-No pain       Physical Exam   Constitutional:  Non-toxic appearance. He does not have a sickly appearance. No distress.   Pt. is well-developed, well-nourished, appears stated age, in no acute distress, alert and oriented x 3. No evidence of depression, anxiety, or agitation. Calm, cooperative, and communicative. Appropriate interactions and affect.   Cardiovascular:   Pulses:       Dorsalis pedis pulses are 2+ on the right side, and 2+ on the left side.        Posterior tibial pulses are 1+ on the right side, and 1+ on the left side.   There is decreased digital hair. Skin is atrophic, slightly hyperpigmented, and pitting edema nora (L>>R)     Pulmonary/Chest: No respiratory distress.   Musculoskeletal:        Right ankle: He exhibits swelling. No tenderness. No lateral malleolus, no medial malleolus, no AITFL, no CF ligament, no head of 5th metatarsal and no proximal fibula tenderness found. Achilles tendon exhibits no pain, no defect and normal Zabala's test results.        Left ankle: He exhibits swelling. No tenderness. No lateral malleolus, no medial malleolus, no AITFL, no CF ligament and no posterior TFL tenderness found. Achilles tendon exhibits no pain, no defect and normal Zabala's test results.        Right foot: There is no tenderness and no bony tenderness.        Left foot: There is no tenderness and no bony tenderness.   Decreased stride, station of gait.  apropulsive toe off.  Increased angle and base of gait.    Patient has hammertoes of digits 2-5 bilateral partially reducible without symptom today.     There is equinus deformity bilateral. There is limitation of dorsiflexion with knees extended and with knees flexed.    Shoes reveals lateral heel counter wear bilateral in athletic shoes.        Lymphadenopathy:   No lymphatic streaking    Negative lymphadenopathy bilateral popliteal fossa and tarsal tunnel.     Neurological:   Etlan-Jenn 5.07 monofilament is " intact bilateral feet. Sharp/dull sensation is also intact Bilateral feet. Proprioception is grossly intact. Vibratory sensation intact (pt able to sense vibration stop within 3-5 seconds)     Skin: Skin is warm and dry. Lesion noted. No bruising, no burn, no laceration and no rash noted. He is not diaphoretic. No cyanosis or erythema. No pallor. Nails show no clubbing.   Ulcer location: medial left ankle  Signs of infection: none  Drainage: none  Purulence: no  Crepitus/fluctuance: no  Periwound: Reddened  Base: thin epithelial itssue  Depth: skin  Probe to bone: no      Ulcer location: posterior left heel  Signs of infection:none  Drainage:  None  Periwound: intact  Base: thin epithelial with bleeding within layers     Psychiatric: His mood appears not anxious. His affect is not inappropriate. His speech is not slurred. He is not combative. He is communicative. He is attentive.   Nursing note reviewed.        Assessment:       Encounter Diagnoses   Name Primary?    PVD (peripheral vascular disease) Yes    Venous stasis of lower extremity     Pre-ulcerative calluses     Plantar fat pad atrophy     Healed ulcer of left foot on examination     Venous stasis dermatitis of both lower extremities          Plan:       Janusz was seen today for heel pain.    Diagnoses and all orders for this visit:    PVD (peripheral vascular disease)    Venous stasis of lower extremity    Pre-ulcerative calluses    Plantar fat pad atrophy    Healed ulcer of left foot on examination    Venous stasis dermatitis of both lower extremities      I counseled the patient on his conditions, their implications and medical management.    The site is cleansed of foreign material as much as possible. The patient is alerted to watch for any signs of infection (redness, pus, pain, increased swelling or fever) and call if such occurs.    All wounds have has remained healed, I advised patient to check feet daily for signs of drainage or lesion  re-opening     Discussed use of daily foot moisturizer to feet, avoiding the webspace's    - Tubigrips to BLE; patient is to elevate legs. When sleeping, place a pillow under lower extremities. When sitting, support the legs so that they are level with the waist.    Follow-up:Patient is to return to the clinic in 9-12 weeks for follow-up but should call [facility] immediately if any signs of infection, such as fever, chills, sweats, increased redness or pain.    Short-term goals include maintaining good offloading and minimizing bioburden, promoting granulation and epithelialization to healing.  Long-term goals include keeping the wound healed by good offloading and medical management under the direction of internist.

## 2019-09-19 NOTE — PATIENT INSTRUCTIONS
Recommend lotions: eucerin, eucerin for diabetics, aquaphor, A&D ointment, gold bond for diabetics, sween, [place] Bees all purpose baby ointment,  urea 40 with aloe (found on amazon.com)    Shoe recommendations: (try 6pm.com, zappos.com , nordstromrack.Benefitter, or shoes.Benefitter for discounted prices) you can visit DSW shoes in [place]  or [employer] [facility] in the [facility] (there are also several shoe brand outlets in the [facility])    Asics (GT 2000 or gel foundations), new balance stability type shoes (such as the 940 series), saucony (stabil c3),  Knight (GTS or Beast or transcend), propet (tennis shoe)    Sofft Brand (women) Joaquin&Ike (men), clarks, crocs, aerosoles, naturalizers, SAS, ecco, born, sindy doan, rockports (dress shoes)    Vionic, burkenstocks, fitflops, propet (sandals)  Nike comfort thong sandals, crocs, propet (house shoes)    Nail Home remedy:  Vicks Vapor rub to nails for easier manageability      Occasional soaks for 15-20 mins in luke warm water with 1 cup of listerine and 1 cup of apple cider vinegar are ok You may add several drops of oil of oregano or tea tree oil as well      Wearing Proper Shoes                    You walk on your feet every day, forcing them to support the weight of your body. Repeated stress on your feet can cause damage over time. The right shoes can help protect your feet. The wrong shoes can cause more foot problems. Read the information below to help you find a shoe that fits your foot needs.      A good shoe fit will cover your foot outline. A shoe that doesnt cover the outline is a bad fit.   Whats your foot shape?  To get a good fit, you need to know the shape of your foot. Do this simple test: While standing, place your foot on a piece of paper and trace around it. Is your foot straight or curved? Do you have a foot problem, such as a bunion, that causes your foot outline to show a bulge on the side of your big toe?  Finding your fit  Bring your foot outline to the  shoe store to help you find the right shoe. Place a shoe you like on top of the outline to see if it matches the shape. The shoe should cover the outline. (If you have a bunion, the shoe may not cover the bulge on the outline. Look for soft leather shoes to stretch over the bunion.) Once youve found a pair of proper shoes, put them on. Walk around. Be sure the shoes dont rub or pinch. If the shoes feel good, youve found your fit!  The right shoe for you  A good shoe has features that provide comfort and support. It must also be the right size and shape for your feet. Look for a shoe made of breathable fabric and lining, such as leather or canvas. Be sure that shoes have enough tread to prevent slipping. Go to a good shoe store for help finding the right shoe.  Good shoe features  An ideal shoe has the following:  · Laces for support. If tying laces is a problem for you, try shoes with Velcro fasteners or jeremy.  · A front of the shoe (toe box) with ½ inch space in front of your longest toes.  · An arch shape that supports your foot.  · No more than 1½ inches of heel.  · A stiff, snug back of the shoe to keep your foot from sliding around.  · A smooth lining with no rough seams.  Shoe shopping tips  Below are some dos and donts for when you go to the shoe store.  Do:  · Select the shoes that feel right. Wear them around the house. Then bring them to your foot healthcare provider to check for fit. If they dont fit well, return them.  · Shop late in the day, when your feet will be slightly bigger.  · Each time you buy shoes, have both your feet measured while you are standing. Foot size changes with time.  · Pick shoes to suit their purpose. High heels are OK for an occasional night on the town. But for everyday wear, choose a more sensible shoe.  · Try on shoes while wearing any inserts specially made for your feet (orthoses).  · Try on both the right and left shoes. If your feet are different sizes, pick a  pair that fits the larger foot.  Dont:  · Dont buy shoes based on shoe size alone. Always try on shoes, as sizes differ from brand to brand and within brands.  · Dont expect shoes to break in. If they dont fit at the store, dont buy them.  · Dont buy a shoe that doesnt match your foot shape.  What about socks?  Always wear socks with shoes. Socks help absorb sweat and reduce friction and blistering. When shopping for shoes, choose soft, padded socks with seams that dont irritate your feet.  If you have foot problems  Some foot problems cause deformities. This can make it hard to find a good fit. Look for shoes made of soft leather to stretch over the deformity. If you have bunions, buy shoes with a wider toe box. To fit hammertoes, look for shoes with a tall toe box. If you have arch problems, you may need inserts. In some cases, youll need to have custom footwear or orthoses made for your feet.  Suggested footwear  Ask your healthcare provider what kind of footwear you need. He or she may recommend a certain brand or shoe store.  Date Last Reviewed: 8/1/2016  © 0194-4235 [employer]. [address] [place] [address]. All rights reserved. This information is not intended as a substitute for professional medical care. Always follow your healthcare professional's instructions.        Step-by-Step:  Inspecting Your Feet   Date Last Reviewed: 10/1/2016  © 6184-8071 [employer]. [address] [place] [address]. All rights reserved. This information is not intended as a substitute for professional medical care. Always follow your healthcare professional's instructions.

## 2019-09-23 DIAGNOSIS — F33.0 MAJOR DEPRESSIVE DISORDER, RECURRENT EPISODE, MILD: ICD-10-CM

## 2019-09-23 RX ORDER — VENLAFAXINE 75 MG/1
150 TABLET ORAL 2 TIMES DAILY
Qty: 360 TABLET | Refills: 0 | Status: SHIPPED | OUTPATIENT
Start: 2019-09-23 | End: 2019-12-18 | Stop reason: SDUPTHER

## 2019-09-23 NOTE — PROGRESS NOTES
"Digital Medicine: Health  Follow-Up    The history is provided by the patient.     Follow Up  Follow-up reason(s): reading review      Readings are missing. Mr. Montgomery is doing okay, and stated that "things seem to be improving". He has resumed monitoring, and is pleased with the readings. He does not have any questions or concerns regarding his BP at this time. Encouraged him to call if questions arise and to submit more frequent readings. He thanked me for calling.     Assessment/Plan - Deferred    SDOH - Deferred    INTERVENTION(S)  encouragement/support      There are no preventive care reminders to display for this patient.    Last 5 Patient Entered Readings                                      Current 30 Day Average: 116/61     Recent Readings 9/13/2019 9/13/2019 8/11/2019 7/31/2019 7/10/2019    SBP (mmHg) 116 136 130 135 141    DBP (mmHg) 63 58 51 59 62    Pulse 78 74 77 70 72                "

## 2019-09-24 ENCOUNTER — PATIENT OUTREACH (OUTPATIENT)
Dept: ADMINISTRATIVE | Facility: OTHER | Age: 74
End: 2019-09-24

## 2019-09-24 DIAGNOSIS — E78.5 HYPERLIPIDEMIA, UNSPECIFIED HYPERLIPIDEMIA TYPE: ICD-10-CM

## 2019-09-24 RX ORDER — ATORVASTATIN CALCIUM 40 MG/1
40 TABLET, FILM COATED ORAL DAILY
Qty: 90 TABLET | Refills: 0 | Status: SHIPPED | OUTPATIENT
Start: 2019-09-24 | End: 2019-12-18 | Stop reason: SDUPTHER

## 2019-09-25 ENCOUNTER — CLINICAL SUPPORT (OUTPATIENT)
Dept: AUDIOLOGY | Facility: CLINIC | Age: 74
End: 2019-09-25
Payer: MEDICARE

## 2019-09-25 ENCOUNTER — OFFICE VISIT (OUTPATIENT)
Dept: RHEUMATOLOGY | Facility: CLINIC | Age: 74
End: 2019-09-25
Payer: MEDICARE

## 2019-09-25 VITALS
DIASTOLIC BLOOD PRESSURE: 70 MMHG | HEIGHT: 73 IN | HEART RATE: 64 BPM | WEIGHT: 315 LBS | BODY MASS INDEX: 41.75 KG/M2 | SYSTOLIC BLOOD PRESSURE: 110 MMHG

## 2019-09-25 DIAGNOSIS — M47.812 SPONDYLOSIS OF CERVICAL REGION WITHOUT MYELOPATHY OR RADICULOPATHY: Primary | ICD-10-CM

## 2019-09-25 DIAGNOSIS — H90.3 SENSORINEURAL HEARING LOSS, BILATERAL: Primary | ICD-10-CM

## 2019-09-25 PROCEDURE — 1100F PR PT FALLS ASSESS DOC 2+ FALLS/FALL W/INJURY/YR: ICD-10-PCS | Mod: HCNC,CPTII,S$GLB, | Performed by: INTERNAL MEDICINE

## 2019-09-25 PROCEDURE — 3074F SYST BP LT 130 MM HG: CPT | Mod: HCNC,CPTII,S$GLB, | Performed by: INTERNAL MEDICINE

## 2019-09-25 PROCEDURE — 99499 RISK ADDL DX/OHS AUDIT: ICD-10-PCS | Mod: HCNC,S$GLB,, | Performed by: INTERNAL MEDICINE

## 2019-09-25 PROCEDURE — 99204 PR OFFICE/OUTPT VISIT, NEW, LEVL IV, 45-59 MIN: ICD-10-PCS | Mod: HCNC,S$GLB,, | Performed by: INTERNAL MEDICINE

## 2019-09-25 PROCEDURE — 92557 COMPREHENSIVE HEARING TEST: CPT | Mod: HCNC,S$GLB,, | Performed by: AUDIOLOGIST-HEARING AID FITTER

## 2019-09-25 PROCEDURE — 1100F PTFALLS ASSESS-DOCD GE2>/YR: CPT | Mod: HCNC,CPTII,S$GLB, | Performed by: INTERNAL MEDICINE

## 2019-09-25 PROCEDURE — 99499 UNLISTED E&M SERVICE: CPT | Mod: HCNC,S$GLB,, | Performed by: INTERNAL MEDICINE

## 2019-09-25 PROCEDURE — 99999 PR PBB SHADOW E&M-EST. PATIENT-LVL I: CPT | Mod: PBBFAC,HCNC,, | Performed by: AUDIOLOGIST-HEARING AID FITTER

## 2019-09-25 PROCEDURE — 3078F DIAST BP <80 MM HG: CPT | Mod: HCNC,CPTII,S$GLB, | Performed by: INTERNAL MEDICINE

## 2019-09-25 PROCEDURE — 99499 NO LOS: ICD-10-PCS | Mod: HCNC,S$GLB,, | Performed by: AUDIOLOGIST-HEARING AID FITTER

## 2019-09-25 PROCEDURE — 92557 PR COMPREHENSIVE HEARING TEST: ICD-10-PCS | Mod: HCNC,S$GLB,, | Performed by: AUDIOLOGIST-HEARING AID FITTER

## 2019-09-25 PROCEDURE — 3074F PR MOST RECENT SYSTOLIC BLOOD PRESSURE < 130 MM HG: ICD-10-PCS | Mod: HCNC,CPTII,S$GLB, | Performed by: INTERNAL MEDICINE

## 2019-09-25 PROCEDURE — 99999 PR PBB SHADOW E&M-EST. PATIENT-LVL I: ICD-10-PCS | Mod: PBBFAC,HCNC,, | Performed by: AUDIOLOGIST-HEARING AID FITTER

## 2019-09-25 PROCEDURE — 99999 PR PBB SHADOW E&M-EST. PATIENT-LVL III: ICD-10-PCS | Mod: PBBFAC,HCNC,, | Performed by: INTERNAL MEDICINE

## 2019-09-25 PROCEDURE — 3288F PR FALLS RISK ASSESSMENT DOCUMENTED: ICD-10-PCS | Mod: HCNC,CPTII,S$GLB, | Performed by: INTERNAL MEDICINE

## 2019-09-25 PROCEDURE — 99999 PR PBB SHADOW E&M-EST. PATIENT-LVL III: CPT | Mod: PBBFAC,HCNC,, | Performed by: INTERNAL MEDICINE

## 2019-09-25 PROCEDURE — 99204 OFFICE O/P NEW MOD 45 MIN: CPT | Mod: HCNC,S$GLB,, | Performed by: INTERNAL MEDICINE

## 2019-09-25 PROCEDURE — 3078F PR MOST RECENT DIASTOLIC BLOOD PRESSURE < 80 MM HG: ICD-10-PCS | Mod: HCNC,CPTII,S$GLB, | Performed by: INTERNAL MEDICINE

## 2019-09-25 PROCEDURE — 99499 UNLISTED E&M SERVICE: CPT | Mod: HCNC,S$GLB,, | Performed by: AUDIOLOGIST-HEARING AID FITTER

## 2019-09-25 PROCEDURE — 3288F FALL RISK ASSESSMENT DOCD: CPT | Mod: HCNC,CPTII,S$GLB, | Performed by: INTERNAL MEDICINE

## 2019-09-25 NOTE — PROGRESS NOTES
Janusz Montgomery was seen in the clinic today for a hearing aid follow-up.     Mr. Montgomery reported he was struggling hearing in background noise. His receivers were changed to #3 85 gain with a 10mm single vent dome on the left and 12mm single vent dome on the right. Acclimatization was set to 2. Complex and simple noise reduction were increased. Soft sound perception was increased to comfort. He was pleased with how the hearing aids sounded. He was counseled on the importance of daily, consistent use of the hearing aids.     Mr. Montgomery will call the clinic for a follow-up appointment as needed. A one year appointment reminder was put in to the Epic system.

## 2019-09-25 NOTE — PROGRESS NOTES
Janusz Montgomery was seen in the clinic today for an hearing evaluation. Mr. Montgomery reported decreased hearing since his last hearing test one year ago.      Otoscopy was unremarkable. Audiological testing revealed a moderate to profound mid to high frequency sensorineural hearing loss in the left ear and a mild to profound sensorineural hearing loss in the right ear. A speech reception threshold was obtained at 25 dBHL in the left ear and 35 dBHL in the right ear. Speech recognition was 84% in the left ear and 80% in the right ear.    Tympanometry revealed rounded, shallow Type A tympanogram in the left ear and a rounded Type A tympanogram in the right ear.    Recommendations:  1. Otologic evaluation  2. Annual hearing evaluation  3. Continued use of hearing aids

## 2019-09-25 NOTE — PROGRESS NOTES
Chief Complaint   Patient presents with    Disease Management     neck pain       Patient was referred by : self     History of presenting illness    74 year old male comes with :    Severe neck pain with headaches  Morning stiffness that gets better after few minutes of walking and moving    C spine xray :    There is poor visualization of C7 and the cervicothoracic junction.  Posterior vertebral alignment is satisfactory.  Vertebral body heights are well maintained.  There is no evidence for acute fracture or bone destruction.  The odontoid is intact.  There are degenerative changes present with small anterior osteophytes noted throughout the cervical spine.  Prevertebral soft tissues are unremarkable.    He had sinusitis at the same time  He got steroids and all symptoms went away    Left foot/ankle hurts because of a old trauma    Left ankle xray  Postsurgical changes are again noted consistent with ORIF of the distal tibia.  No evidence of hardware fracture or loosening is seen.  Callus formation is noted along the previously identified tibia and fibula fractures.  Osseous fusion is noted in the distal tibia and fibula.  No new fractures are seen.    Left knee is replaced      Past history : anxiety and depression,CAD,PVD,Carotid artery stenosis  Has a loop recorder  CHARISMA  Obesity  Gout is doing good  GERD  Prostate cancer recovered  BPH  A fib  HTN,HLD   Asbestosis,restrictive lung disease  He has a mild decrease in DLCO, pulmonary nodules and pleural plaques (history of remote asbestos exposure), allergic rhinitis  TTE showed normal EF and moderate eccentric left ventricular hypertrophy.     CT thorax shoed calcified pleural plaques with mild prominence of interstitial markings (consistent with asbestos exposure).     CPX study :    · Moderate to severe functional impairment associated with a borderline reduced breathing reserve, normal oxygen stauration, a good effort, and a borderline reduced AT. These  findings are indicative of functional impairment secondary to deconditioning, subclinical ventilatory defect.  · There were no arrhythmias during stress.  · ST depression of 1 mm with horizontal slope in the inferolateral leads was noted during stress.  · The test was stopped because the patient experienced shortness of breath. The test was stopped secondary to shortness of breath  · Overall, the patient's exercise capacity was moderately impaired.  · There is 1 mm of depression in the leads.  · The EKG portion of this study is suspicious for myocardial ischemia.         Family history : cancers    Social history : not a smoker,alcohol         Review of Systems       No skin rashes,malar rash,photosensitivity   No telangiectasias   No calcinosis   No psoriasis   No patchy alopecia   No oral and nasal ulcers   No dry eyes and dry mouth   No dysphagia,diplopia and dysphonia and muscle weakness   No n/v/d/c   No acid reflux+   No raynaud's+   No digital ulcers   No cytopenias   No renal issues   No blood clots   No fever,chills,night sweats,weight loss and loss of appetite   No new onset headaches   No recurrent conjunctivitis or uveitis or scleritis or episcleritis   No chronic or bloody diarrhea with no u colitis or crohn's /inflammatory bowel disease   No penile and urethral  d/c/STDs/no ulcers     Physical Exam     ESTEVEZ-28 tender joint count: 0  ESTEVEZ-28 swollen joint count: 0       Physical Exam   Constitutional: He is oriented to person, place, and time and well-developed, well-nourished, and in no distress. No distress.   HENT:   Head: Normocephalic.   Mouth/Throat: Oropharynx is clear and moist.   Eyes: Conjunctivae are normal. Pupils are equal, round, and reactive to light. Right eye exhibits no discharge. Left eye exhibits no discharge. No scleral icterus.   Neck: Normal range of motion. No thyromegaly present.   Cardiovascular: Normal rate, regular rhythm, normal heart sounds and intact distal pulses.     Pulmonary/Chest: Effort normal and breath sounds normal. No stridor.   Abdominal: Soft. Bowel sounds are normal.   Lymphadenopathy:     He has no cervical adenopathy.   Neurological: He is alert and oriented to person, place, and time.   Skin: Skin is warm. No rash noted. He is not diaphoretic.     Psychiatric: Affect and judgment normal.   Musculoskeletal: Normal range of motion.           Assessment     74 year old male comes with :    One episode of severe neck pain with headaches  Morning stiffness that gets better after few minutes of walking and moving    C spine xray :arthritis changes noted    He had sinusitis at the same time  He got steroids and all symptoms went away    He really has no other joint involvement    Left foot/ankle hurts because of a old trauma    He has many conditions :anxiety and depression,CAD,PVD,Carotid artery stenosis  Has a loop recorder  CHARISMA  Obesity  Gout is doing good  GERD  Prostate cancer recovered  BPH  A fib  HTN,HLD   Asbestosis,restrictive lung disease  Recent CPX study abnormal indicating cardiac abnormality  SOB with minimal exertion is the only complaint today      1. Spondylosis of cervical region without myelopathy or radiculopathy        Suspect he had a mild flare of the C spine arthritis which has resolved    New problem     Plan    Trial of neck PT if this were to happen again    Ask pulm about the abnormal CPX study    Janusz was seen today for disease management.    Diagnoses and all orders for this visit:    Spondylosis of cervical region without myelopathy or radiculopathy

## 2019-09-27 ENCOUNTER — PATIENT MESSAGE (OUTPATIENT)
Dept: RADIATION ONCOLOGY | Facility: CLINIC | Age: 74
End: 2019-09-27

## 2019-09-27 ENCOUNTER — PATIENT MESSAGE (OUTPATIENT)
Dept: AUDIOLOGY | Facility: CLINIC | Age: 74
End: 2019-09-27

## 2019-10-01 ENCOUNTER — TELEPHONE (OUTPATIENT)
Dept: ELECTROPHYSIOLOGY | Facility: CLINIC | Age: 74
End: 2019-10-01

## 2019-10-01 DIAGNOSIS — I25.10 CORONARY ARTERY DISEASE INVOLVING NATIVE CORONARY ARTERY OF NATIVE HEART WITHOUT ANGINA PECTORIS: Primary | ICD-10-CM

## 2019-10-01 NOTE — PROGRESS NOTES
I have placed a referral to cardiology today to evaluate the findings of his CPET study and continue the evaluation for suspected myocardial ischemia.  I called the patient regarding this finding and reached his wife.  She states he is sleeping and I will call back this afternoon.      Ryan Keen, MD [facility] PGY5  [phone]

## 2019-10-01 NOTE — TELEPHONE ENCOUNTER
Dr Smith is no longer with [facility], will you be taking over care?  If not then I can set patient up with a new cardiologist here.  Plz advise    Vera          ----- Message from Fredo Manrique MD sent at 10/1/2019 12:08 PM CDT -----  Patient received this CPX study with findings suggestive of ischemia.  Per chart review, you are his primary cardiologist. Please evaluate and treat accordingly.  Thanks, Fredo Manrique (Pulmonary and Critical Care), MD

## 2019-10-03 ENCOUNTER — PATIENT MESSAGE (OUTPATIENT)
Dept: FAMILY MEDICINE | Facility: CLINIC | Age: 74
End: 2019-10-03

## 2019-10-03 ENCOUNTER — OFFICE VISIT (OUTPATIENT)
Dept: HOME HEALTH SERVICES | Facility: CLINIC | Age: 74
End: 2019-10-03
Payer: MEDICARE

## 2019-10-03 VITALS
SYSTOLIC BLOOD PRESSURE: 136 MMHG | BODY MASS INDEX: 41.75 KG/M2 | HEIGHT: 73 IN | WEIGHT: 315 LBS | DIASTOLIC BLOOD PRESSURE: 66 MMHG

## 2019-10-03 DIAGNOSIS — L97.321 CHRONIC ULCER OF LEFT ANKLE LIMITED TO BREAKDOWN OF SKIN: ICD-10-CM

## 2019-10-03 DIAGNOSIS — N40.1 BENIGN PROSTATIC HYPERPLASIA WITH URINARY OBSTRUCTION: ICD-10-CM

## 2019-10-03 DIAGNOSIS — G47.33 OBSTRUCTIVE SLEEP APNEA: ICD-10-CM

## 2019-10-03 DIAGNOSIS — I10 ESSENTIAL HYPERTENSION: ICD-10-CM

## 2019-10-03 DIAGNOSIS — J98.4 RESTRICTIVE LUNG DISEASE: ICD-10-CM

## 2019-10-03 DIAGNOSIS — I25.10 CORONARY ARTERY DISEASE INVOLVING NATIVE CORONARY ARTERY OF NATIVE HEART WITHOUT ANGINA PECTORIS: ICD-10-CM

## 2019-10-03 DIAGNOSIS — E78.5 HYPERLIPIDEMIA, UNSPECIFIED HYPERLIPIDEMIA TYPE: ICD-10-CM

## 2019-10-03 DIAGNOSIS — Z87.09 PERSONAL HISTORY OF ASBESTOSIS: ICD-10-CM

## 2019-10-03 DIAGNOSIS — I73.9 PVD (PERIPHERAL VASCULAR DISEASE): ICD-10-CM

## 2019-10-03 DIAGNOSIS — H02.59 FLOPPY EYELID SYNDROME: ICD-10-CM

## 2019-10-03 DIAGNOSIS — F33.0 MAJOR DEPRESSIVE DISORDER, RECURRENT EPISODE, MILD: ICD-10-CM

## 2019-10-03 DIAGNOSIS — I65.23 BILATERAL CAROTID ARTERY STENOSIS: ICD-10-CM

## 2019-10-03 DIAGNOSIS — C61 PROSTATE CANCER: ICD-10-CM

## 2019-10-03 DIAGNOSIS — B00.52 HERPES SIMPLEX KERATOCONJUNCTIVITIS: Primary | ICD-10-CM

## 2019-10-03 DIAGNOSIS — F41.1 GENERALIZED ANXIETY DISORDER: ICD-10-CM

## 2019-10-03 DIAGNOSIS — N13.8 BENIGN PROSTATIC HYPERPLASIA WITH URINARY OBSTRUCTION: ICD-10-CM

## 2019-10-03 DIAGNOSIS — Z00.00 ENCOUNTER FOR PREVENTIVE HEALTH EXAMINATION: ICD-10-CM

## 2019-10-03 DIAGNOSIS — E66.01 MORBID OBESITY WITH BODY MASS INDEX OF 45.0-49.9 IN ADULT: ICD-10-CM

## 2019-10-03 DIAGNOSIS — K21.9 GASTROESOPHAGEAL REFLUX DISEASE, ESOPHAGITIS PRESENCE NOT SPECIFIED: ICD-10-CM

## 2019-10-03 PROCEDURE — G0439 PR MEDICARE ANNUAL WELLNESS SUBSEQUENT VISIT: ICD-10-PCS | Mod: S$GLB,,, | Performed by: NURSE PRACTITIONER

## 2019-10-03 PROCEDURE — 3075F SYST BP GE 130 - 139MM HG: CPT | Mod: CPTII,S$GLB,, | Performed by: NURSE PRACTITIONER

## 2019-10-03 PROCEDURE — 3078F DIAST BP <80 MM HG: CPT | Mod: CPTII,S$GLB,, | Performed by: NURSE PRACTITIONER

## 2019-10-03 PROCEDURE — 99499 RISK ADDL DX/OHS AUDIT: ICD-10-PCS | Mod: S$GLB,,, | Performed by: NURSE PRACTITIONER

## 2019-10-03 PROCEDURE — 3075F PR MOST RECENT SYSTOLIC BLOOD PRESS GE 130-139MM HG: ICD-10-PCS | Mod: CPTII,S$GLB,, | Performed by: NURSE PRACTITIONER

## 2019-10-03 PROCEDURE — 99499 UNLISTED E&M SERVICE: CPT | Mod: S$GLB,,, | Performed by: NURSE PRACTITIONER

## 2019-10-03 PROCEDURE — G0439 PPPS, SUBSEQ VISIT: HCPCS | Mod: S$GLB,,, | Performed by: NURSE PRACTITIONER

## 2019-10-03 PROCEDURE — 3078F PR MOST RECENT DIASTOLIC BLOOD PRESSURE < 80 MM HG: ICD-10-PCS | Mod: CPTII,S$GLB,, | Performed by: NURSE PRACTITIONER

## 2019-10-03 NOTE — PROGRESS NOTES
"Janusz Montgomery presented for a  Medicare AWV and comprehensive Health Risk Assessment today. The following components were reviewed and updated:    · Medical history  · Family History  · Social history  · Allergies and Current Medications  · Health Risk Assessment  · Health Maintenance  · Care Team     ** See Completed Assessments for Annual Wellness Visit within the encounter summary.**       The following assessments were completed:  · Living Situation  · CAGE  · Depression Screening  · Timed Get Up and Go  · Whisper Test  · Cognitive Function Screening  ·   ·   ·   · Nutrition Screening  · ADL Screening  · PAQ Screening    Vitals:    10/03/19 1503   BP: 136/66   Weight: (!) 156.9 kg (346 lb)   Height: 6' 1" (1.854 m)     Body mass index is 45.65 kg/m².  Physical Exam   Constitutional: He is oriented to person, place, and time. He appears well-developed.   He is Obese   HENT:   Head: Normocephalic.   Eyes: Pupils are equal, round, and reactive to light. EOM are normal.   Neck: Normal range of motion.   Cardiovascular: Normal rate and regular rhythm.   Pulmonary/Chest: Effort normal and breath sounds normal. No respiratory distress. He has no wheezes.   Abdominal: Soft. Bowel sounds are normal. He exhibits no distension.   Obese   Musculoskeletal: Normal range of motion.   Neurological: He is alert and oriented to person, place, and time.   Skin: Skin is warm and dry.   Dressing to left ankle, clean, dry, and intact   Psychiatric: He has a normal mood and affect. His behavior is normal.         Diagnoses and health risks identified today and associated recommendations/orders:      1. Encounter for preventive health examination  Assessment completed. Preventive measures reviewed.    2. Herpes simplex keratoconjunctivitis  Stable, followed by Optometry.    3. Major depressive disorder, recurrent episode, mild  Stable, followed by Psychiatry.    4. Generalized anxiety disorder  Stable, followed by Psychiatry.    5. " Floppy eyelid syndrome  Stable, followed by Optometry.    6. Restrictive lung disease  Stable, followed by Pulmonary.    7. Personal history of asbestosis  Stable, followed PCP.    8. Essential hypertension  Stable, followed PCP.    9. Hyperlipidemia, unspecified hyperlipidemia type  Stable, followed PCP.    10. Coronary artery disease involving native coronary artery of native heart without angina pectoris s/p RCA stent  Stable, followed Cardiology.    11. PVD (peripheral vascular disease)  Stable, followed Cardiology.    12. Bilateral carotid artery stenosis  Stable, followed Cardiology.    13. Benign prostatic hyperplasia with urinary obstruction  Stable, followed Urology.    14. Obstructive sleep apnea  Stable, followed Pulmonary.    15. Gastroesophageal reflux disease, esophagitis presence not specified  Stable, followed PCP.    16. Morbid obesity with body mass index of 45.0-49.9 in adult  Stable, followed PCP.    17. Prostate cancer  Stable, followed Urology.    18. Chronic ulcer of left ankle limited to breakdown of skin  Stable, followed PCP.    Provided Janusz with a 5-10 year written screening schedule and personal prevention plan. Recommendations were developed using the USPSTF age appropriate recommendations. Education, counseling, and referrals were provided as needed. After Visit Summary printed and given to patient which includes a list of additional screenings\tests needed.    No follow-ups on file.    Zoie Light NP  I offered to discuss end of life issues, including information on how to make advance directives that the patient could use to name someone who would make medical decisions on their behalf if they became too ill to make themselves.    ___Patient declined  _X_Patient is interested, I provided paper work and offered to discuss.

## 2019-10-03 NOTE — PATIENT INSTRUCTIONS
Counseling and Referral of Other Preventative  (Italic type indicates deductible and co-insurance are waived)    Patient Name: Janusz Montgomery  Today's Date: 10/3/2019    Health Maintenance       Date Due Completion Date    TETANUS VACCINE 01/23/1963 ---    Shingles Vaccine (2 of 3) 07/27/2009 6/1/2009    Influenza Vaccine (1) 09/01/2019 10/30/2018    Colonoscopy 11/22/2019 (Originally 6/21/2017) 6/21/2007    Lipid Panel 11/28/2019 11/28/2018    PROSTATE-SPECIFIC ANTIGEN 03/21/2020 3/21/2019    High Dose Statin 09/25/2020 9/25/2019        No orders of the defined types were placed in this encounter.    The following information is provided to all patients.  This information is to help you find resources for any of the problems found today that may be affecting your health:                Living healthy guide: www.[facility].[place].gov      Understanding Diabetes: www.diabetes.org      Eating healthy: www.cdc.gov/healthyweight      [facility] home safety checklist: www.cdc.gov/steadi/patient.html      Agency on Aging: www.goea.[place].[facility]      Alcoholics anonymous (AA): www.aa.org      Physical Activity: www.fallon.nih.gov/mu6xrsv      Tobacco use: www.quitwithusla.org

## 2019-10-04 ENCOUNTER — PATIENT MESSAGE (OUTPATIENT)
Dept: FAMILY MEDICINE | Facility: CLINIC | Age: 74
End: 2019-10-04

## 2019-10-04 ENCOUNTER — PATIENT MESSAGE (OUTPATIENT)
Dept: UROLOGY | Facility: CLINIC | Age: 74
End: 2019-10-04

## 2019-10-04 DIAGNOSIS — C61 PROSTATE CANCER: Primary | ICD-10-CM

## 2019-10-09 NOTE — PROGRESS NOTES
Digital Medicine: Clinician Follow-Up    States he was paying bills when he checked his BP 10/7  States it has been a while since he charged his BP cuff. Believes it fits him correctly.  He does not take furosemide daily    The history is provided by the patient.     Follow Up  Follow-up reason(s): reading review              Sleep Apnea  Patient previously diagnosed with CHARISMA     He reports he is currently using CPAP     Medication Affordability  Patient is currently not having problems affording medications    Medication Adherence:     reports adherence with olmesartan  He does not wonder if medications are working.  He knows purpose of medications.        INTERVENTION(S)  reviewed appropriate dose schedule and encouragement/support    PLAN  patient verbalizes understanding    Reviewed to charge BP cuff monthly  Reminded him to rest 5 minutes prior to measurement and to wait at least 45 minutes - 1 hour after medications  Discussed that furosemide is often prescribed PRN edema but that he can confirm with Dr. Weathers. He also has a cardiology appointment 10/24 with Dr. Sandhu.  Continue current regimen and monitoring.      There are no preventive care reminders to display for this patient.    Last 5 Patient Entered Readings                                      Current 30 Day Average: 133/67     Recent Readings 10/7/2019 10/7/2019 9/23/2019 9/13/2019 9/13/2019    SBP (mmHg) 146 149 136 116 136    DBP (mmHg) 74 72 66 63 58    Pulse 69 68 59 78 74             Hypertension Medications             furosemide (LASIX) 20 MG tablet Take 1 tablet (20 mg total) by mouth once daily.    olmesartan (BENICAR) 20 MG tablet Take 1 tablet (20 mg total) by mouth once daily. (replaces ramipril for blood pressure)

## 2019-10-11 ENCOUNTER — CLINICAL SUPPORT (OUTPATIENT)
Dept: CARDIOLOGY | Facility: HOSPITAL | Age: 74
End: 2019-10-11
Payer: MEDICARE

## 2019-10-11 DIAGNOSIS — Z95.818 STATUS POST PLACEMENT OF IMPLANTABLE LOOP RECORDER: ICD-10-CM

## 2019-10-11 DIAGNOSIS — I48.91 ATRIAL FIBRILLATION: ICD-10-CM

## 2019-10-11 PROCEDURE — 93298 REM INTERROG DEV EVAL SCRMS: CPT | Mod: HCNC,,, | Performed by: INTERNAL MEDICINE

## 2019-10-11 PROCEDURE — 93299 CARDIAC DEVICE CHECK - REMOTE: CPT | Mod: HCNC | Performed by: INTERNAL MEDICINE

## 2019-10-11 PROCEDURE — 93298 CARDIAC DEVICE CHECK - REMOTE: ICD-10-PCS | Mod: HCNC,,, | Performed by: INTERNAL MEDICINE

## 2019-10-21 ENCOUNTER — TELEPHONE (OUTPATIENT)
Dept: UROLOGY | Facility: CLINIC | Age: 74
End: 2019-10-21

## 2019-10-21 NOTE — TELEPHONE ENCOUNTER
----- Message from Sally Ralph sent at 10/21/2019  3:22 PM CDT -----  Contact: Patient ph [phone]  Type:  Patient Returning Call    Who Called: Patient    Who Left Message for Patient: Not sure    Does the patient know what this is regarding?: Appointment and blood work    Would the patient rather a call back or a response via [facility]? Call back    Best Call Back Number:[phone]

## 2019-10-22 ENCOUNTER — LAB VISIT (OUTPATIENT)
Dept: LAB | Facility: HOSPITAL | Age: 74
End: 2019-10-22
Attending: STUDENT IN AN ORGANIZED HEALTH CARE EDUCATION/TRAINING PROGRAM
Payer: MEDICARE

## 2019-10-22 DIAGNOSIS — C61 PROSTATE CANCER: ICD-10-CM

## 2019-10-22 PROCEDURE — 84153 ASSAY OF PSA TOTAL: CPT | Mod: HCNC

## 2019-10-22 PROCEDURE — 36415 COLL VENOUS BLD VENIPUNCTURE: CPT | Mod: HCNC

## 2019-10-23 ENCOUNTER — PATIENT MESSAGE (OUTPATIENT)
Dept: ADMINISTRATIVE | Facility: OTHER | Age: 74
End: 2019-10-23

## 2019-10-23 ENCOUNTER — OFFICE VISIT (OUTPATIENT)
Dept: UROLOGY | Facility: CLINIC | Age: 74
End: 2019-10-23
Payer: MEDICARE

## 2019-10-23 VITALS
HEIGHT: 73 IN | WEIGHT: 315 LBS | DIASTOLIC BLOOD PRESSURE: 80 MMHG | BODY MASS INDEX: 41.75 KG/M2 | SYSTOLIC BLOOD PRESSURE: 132 MMHG

## 2019-10-23 DIAGNOSIS — N52.9 ED (ERECTILE DYSFUNCTION) OF ORGANIC ORIGIN: ICD-10-CM

## 2019-10-23 DIAGNOSIS — C61 PROSTATE CANCER: Primary | ICD-10-CM

## 2019-10-23 LAB — COMPLEXED PSA SERPL-MCNC: 0.01 NG/ML (ref 0–4)

## 2019-10-23 PROCEDURE — 1101F PT FALLS ASSESS-DOCD LE1/YR: CPT | Mod: HCNC,CPTII,S$GLB, | Performed by: NURSE PRACTITIONER

## 2019-10-23 PROCEDURE — 3075F SYST BP GE 130 - 139MM HG: CPT | Mod: HCNC,CPTII,S$GLB, | Performed by: NURSE PRACTITIONER

## 2019-10-23 PROCEDURE — 1101F PR PT FALLS ASSESS DOC 0-1 FALLS W/OUT INJ PAST YR: ICD-10-PCS | Mod: HCNC,CPTII,S$GLB, | Performed by: NURSE PRACTITIONER

## 2019-10-23 PROCEDURE — 99214 PR OFFICE/OUTPT VISIT, EST, LEVL IV, 30-39 MIN: ICD-10-PCS | Mod: HCNC,S$GLB,, | Performed by: NURSE PRACTITIONER

## 2019-10-23 PROCEDURE — 99999 PR PBB SHADOW E&M-EST. PATIENT-LVL IV: CPT | Mod: PBBFAC,HCNC,, | Performed by: NURSE PRACTITIONER

## 2019-10-23 PROCEDURE — 99214 OFFICE O/P EST MOD 30 MIN: CPT | Mod: HCNC,S$GLB,, | Performed by: NURSE PRACTITIONER

## 2019-10-23 PROCEDURE — 3079F PR MOST RECENT DIASTOLIC BLOOD PRESSURE 80-89 MM HG: ICD-10-PCS | Mod: HCNC,CPTII,S$GLB, | Performed by: NURSE PRACTITIONER

## 2019-10-23 PROCEDURE — 99999 PR PBB SHADOW E&M-EST. PATIENT-LVL IV: ICD-10-PCS | Mod: PBBFAC,HCNC,, | Performed by: NURSE PRACTITIONER

## 2019-10-23 PROCEDURE — 3075F PR MOST RECENT SYSTOLIC BLOOD PRESS GE 130-139MM HG: ICD-10-PCS | Mod: HCNC,CPTII,S$GLB, | Performed by: NURSE PRACTITIONER

## 2019-10-23 PROCEDURE — 3079F DIAST BP 80-89 MM HG: CPT | Mod: HCNC,CPTII,S$GLB, | Performed by: NURSE PRACTITIONER

## 2019-10-23 RX ORDER — SILDENAFIL 100 MG/1
100 TABLET, FILM COATED ORAL DAILY PRN
Qty: 10 TABLET | Refills: 11 | Status: SHIPPED | OUTPATIENT
Start: 2019-10-23 | End: 2019-12-11
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
## 2019-10-24 ENCOUNTER — OFFICE VISIT (OUTPATIENT)
Dept: CARDIOLOGY | Facility: CLINIC | Age: 74
End: 2019-10-24
Payer: MEDICARE

## 2019-10-24 VITALS
WEIGHT: 315 LBS | SYSTOLIC BLOOD PRESSURE: 150 MMHG | BODY MASS INDEX: 44.5 KG/M2 | DIASTOLIC BLOOD PRESSURE: 65 MMHG | HEART RATE: 75 BPM | OXYGEN SATURATION: 97 %

## 2019-10-24 DIAGNOSIS — J98.4 RESTRICTIVE LUNG DISEASE: Primary | ICD-10-CM

## 2019-10-24 DIAGNOSIS — R07.89 CHEST PAIN, ATYPICAL: ICD-10-CM

## 2019-10-24 DIAGNOSIS — I10 HYPERTENSION: ICD-10-CM

## 2019-10-24 DIAGNOSIS — I48.0 PAROXYSMAL ATRIAL FIBRILLATION: ICD-10-CM

## 2019-10-24 DIAGNOSIS — I10 ESSENTIAL HYPERTENSION: ICD-10-CM

## 2019-10-24 DIAGNOSIS — I65.23 BILATERAL CAROTID ARTERY STENOSIS: ICD-10-CM

## 2019-10-24 DIAGNOSIS — I73.9 PVD (PERIPHERAL VASCULAR DISEASE): ICD-10-CM

## 2019-10-24 DIAGNOSIS — E78.2 MIXED HYPERLIPIDEMIA: ICD-10-CM

## 2019-10-24 DIAGNOSIS — I25.10 CORONARY ARTERY DISEASE INVOLVING NATIVE CORONARY ARTERY OF NATIVE HEART WITHOUT ANGINA PECTORIS: ICD-10-CM

## 2019-10-24 PROCEDURE — 3078F DIAST BP <80 MM HG: CPT | Mod: HCNC,CPTII,S$GLB, | Performed by: INTERNAL MEDICINE

## 2019-10-24 PROCEDURE — 3077F SYST BP >= 140 MM HG: CPT | Mod: HCNC,CPTII,S$GLB, | Performed by: INTERNAL MEDICINE

## 2019-10-24 PROCEDURE — 1101F PR PT FALLS ASSESS DOC 0-1 FALLS W/OUT INJ PAST YR: ICD-10-PCS | Mod: HCNC,CPTII,S$GLB, | Performed by: INTERNAL MEDICINE

## 2019-10-24 PROCEDURE — 1101F PT FALLS ASSESS-DOCD LE1/YR: CPT | Mod: HCNC,CPTII,S$GLB, | Performed by: INTERNAL MEDICINE

## 2019-10-24 PROCEDURE — 99214 PR OFFICE/OUTPT VISIT, EST, LEVL IV, 30-39 MIN: ICD-10-PCS | Mod: HCNC,S$GLB,, | Performed by: INTERNAL MEDICINE

## 2019-10-24 PROCEDURE — 99214 OFFICE O/P EST MOD 30 MIN: CPT | Mod: HCNC,S$GLB,, | Performed by: INTERNAL MEDICINE

## 2019-10-24 PROCEDURE — 3077F PR MOST RECENT SYSTOLIC BLOOD PRESSURE >= 140 MM HG: ICD-10-PCS | Mod: HCNC,CPTII,S$GLB, | Performed by: INTERNAL MEDICINE

## 2019-10-24 PROCEDURE — 3078F PR MOST RECENT DIASTOLIC BLOOD PRESSURE < 80 MM HG: ICD-10-PCS | Mod: HCNC,CPTII,S$GLB, | Performed by: INTERNAL MEDICINE

## 2019-10-24 PROCEDURE — 93000 EKG 12-LEAD: ICD-10-PCS | Mod: HCNC,S$GLB,, | Performed by: INTERNAL MEDICINE

## 2019-10-24 PROCEDURE — 93000 ELECTROCARDIOGRAM COMPLETE: CPT | Mod: HCNC,S$GLB,, | Performed by: INTERNAL MEDICINE

## 2019-10-24 PROCEDURE — 99999 PR PBB SHADOW E&M-EST. PATIENT-LVL IV: CPT | Mod: PBBFAC,HCNC,, | Performed by: INTERNAL MEDICINE

## 2019-10-24 PROCEDURE — 99999 PR PBB SHADOW E&M-EST. PATIENT-LVL IV: ICD-10-PCS | Mod: PBBFAC,HCNC,, | Performed by: INTERNAL MEDICINE

## 2019-10-24 NOTE — PROGRESS NOTES
Subjective:    Patient ID:  Janusz Montgomery Jr. is a 74 y.o. male who presents for follow-up of Chest Pain      HPI     CAD - POLO to RCA > 20 years ago, PAF on eliquis ,[employer] ILR, obesity, HTN, HLD    Previosly followed by Dr Smith  Here follow-up of CAD and paroxysmal atrial fibrillation.  He did follow-up with EP at [facility] and his been started on blood thinners.  He still has mainly issues with shortness of breath and little bit of swelling from time to time.  He takes Lasix as needed.  He denies any PND or orthopnea.  He did follow-up is now Bahai with CPAP.  He's not expressing dizziness, presyncope or syncope.  We discussed again sodium intake which is a fan of the Chinese buffet.  We also discussed exercise as tolerated.     Today:  Here for follow-up of CAD and paroxysmal atrial fibrillation.  Had no worsening cardiopulmonary complaints.  He says slowly able to go up a flight of stairs without any issues.  He denies any chest pain but does get shortness of breath on heavier activity.  He's had no sustained tachycardia or palpitations.  Recent loop recorder transmission showed only sinus rhythm.  She denies any PND, orthopnea or lower edema.  He's not expressing dizziness, presyncope or syncope.  He does need cardiac clearance for prostate seeding with diagnosed cancer.     Lower extremity arterial ultrasound: 2016  No appreciable focal stenosis; however, ankle-brachial index on the left is abnormal.     Echo: 7/23/19  · Normal left ventricular systolic function. The estimated ejection fraction is 60%  · No wall motion abnormalities.  · Moderate eccentric left ventricular hypertrophy.  · Mild left ventricular enlargement.  · Normal right ventricular systolic function.  · The sinuses of Valsalva is mildly dilated. 3.8cm.  · Mild aortic regurgitation.  · The estimated PA systolic pressure is 33 mm Hg          NST:  12-17  Impression: ABNORMAL MYOCARDIAL PERFUSION  1. There is evidence for mild  myocardial ischemia in the anterior wall of the left ventricle. Specificity is reduced secondary to body habitus.   2. The perfusion scan is free of evidence for myocardial injury.   3. There is a moderate intensity fixed defect in the inferior wall of the left ventricle, secondary to diaphragm attenuation.   4. Resting wall motion is physiologic.   5. Visually estimated LV function is normal.   6. The ventricular volumes are normal at rest and stress.   7. The extracardiac distribution of radioactivity is normal.   8. When compared to the previous study from 01/14/2009, possible anterior ischemia (vs attenuation artifact) and fixed inferior defect (suggestive of attenuation artifact) now present.  Consider PET-stress for further evaluation if clinically indicated.     LDL-84  11-17     Carotid ultrasound:  5-18  CONCLUSIONS   There is 20 - 39% right Internal Carotid stenosis.  There is 20 - 39% left Internal Carotid stenosis.    Reports worsening PURDY - sometimes associated with chest tightness  EKG NSR - ok    Review of Systems   Constitution: Negative for decreased appetite.   HENT: Negative for ear discharge.    Eyes: Negative for blurred vision.   Endocrine: Negative for polyphagia.   Skin: Negative for nail changes.   Genitourinary: Negative for bladder incontinence.   Neurological: Negative for aphonia.   Psychiatric/Behavioral: Negative for hallucinations.   Allergic/Immunologic: Negative for hives.        Objective:    Physical Exam   Constitutional: He is oriented to person, place, and time. He appears well-developed and well-nourished. No distress.   HENT:   Head: Normocephalic and atraumatic.   Eyes: Pupils are equal, round, and reactive to light. Conjunctivae and EOM are normal.   Neck: Normal range of motion. Neck supple. No thyromegaly present.   Cardiovascular: Normal rate, regular rhythm and normal heart sounds.   No murmur heard.  Pulmonary/Chest: Effort normal and breath sounds normal. No  respiratory distress. He has no wheezes. He has no rales. He exhibits no tenderness.   Abdominal: Soft. Bowel sounds are normal.   Musculoskeletal: He exhibits no edema.   Neurological: He is alert and oriented to person, place, and time.   Skin: Skin is warm and dry.   Psychiatric: He has a normal mood and affect. His behavior is normal.         Assessment:       1. Restrictive lung disease    2. Essential hypertension    3. Mixed hyperlipidemia    4. Paroxysmal atrial fibrillation     5. Coronary artery disease involving native coronary artery of native heart without angina pectoris s/p RCA stent    6. PVD (peripheral vascular disease)    7. Bilateral carotid artery stenosis    8. Chest pain, atypical         Plan:       PET stress for CAD and CP

## 2019-10-24 NOTE — LETTER
October 24, 2019      Fredo Manrique MD  [address]  [facility] [address]           [facility] - Cardiology  [address]  [facility] [place] [address]  Phone: [phone]          Patient: Janusz Montgomery Jr.   MR Number: [id number]   YOB: 1945   Date of Visit: 10/24/2019       Dear Dr. Fredo Manrique:    Thank you for referring Janusz Montgomery to me for evaluation. Attached you will find relevant portions of my assessment and plan of care.    If you have questions, please do not hesitate to call me. I look forward to following Janusz Montgomery along with you.    Sincerely,    Benjamin Sandhu MD    Enclosure  CC:  No Recipients    If you would like to receive this communication electronically, please contact [email or web address] or [phone] to request more information on [employer] access.    For providers and/or their staff who would like to refer a patient to [facility], please contact us through our one-stop-shop provider referral line, [facility] , at [phone].    If you feel you have received this communication in error or would no longer like to receive these types of communications, please e-mail [email or web address]

## 2019-10-30 ENCOUNTER — PATIENT MESSAGE (OUTPATIENT)
Dept: CARDIOLOGY | Facility: CLINIC | Age: 74
End: 2019-10-30

## 2019-10-30 RX ORDER — NITROGLYCERIN 0.4 MG/1
0.4 TABLET SUBLINGUAL EVERY 5 MIN PRN
Qty: 25 TABLET | Refills: 11 | Status: SHIPPED | OUTPATIENT
Start: 2019-10-30 | End: 2021-06-29 | Stop reason: SDUPTHER

## 2019-11-10 ENCOUNTER — CLINICAL SUPPORT (OUTPATIENT)
Dept: CARDIOLOGY | Facility: HOSPITAL | Age: 74
End: 2019-11-10
Attending: INTERNAL MEDICINE
Payer: MEDICARE

## 2019-11-10 DIAGNOSIS — Z95.818 STATUS POST PLACEMENT OF IMPLANTABLE LOOP RECORDER: ICD-10-CM

## 2019-11-10 PROCEDURE — 93299 CARDIAC DEVICE CHECK - REMOTE: CPT | Mod: HCNC | Performed by: INTERNAL MEDICINE

## 2019-11-10 PROCEDURE — 93298 CARDIAC DEVICE CHECK - REMOTE: ICD-10-PCS | Mod: HCNC,,, | Performed by: INTERNAL MEDICINE

## 2019-11-10 PROCEDURE — 93298 REM INTERROG DEV EVAL SCRMS: CPT | Mod: HCNC,,, | Performed by: INTERNAL MEDICINE

## 2019-11-13 ENCOUNTER — PATIENT OUTREACH (OUTPATIENT)
Dept: OTHER | Facility: OTHER | Age: 74
End: 2019-11-13

## 2019-11-13 ENCOUNTER — PATIENT MESSAGE (OUTPATIENT)
Dept: OTHER | Facility: OTHER | Age: 74
End: 2019-11-13

## 2019-11-13 NOTE — PROGRESS NOTES
Left voicemail requesting call back  Request more BP readings  Stress test 11/20 and cardiology follow up 11/29/19

## 2019-11-14 ENCOUNTER — OFFICE VISIT (OUTPATIENT)
Dept: PODIATRY | Facility: CLINIC | Age: 74
End: 2019-11-14
Payer: MEDICARE

## 2019-11-14 VITALS
HEIGHT: 73 IN | WEIGHT: 315 LBS | DIASTOLIC BLOOD PRESSURE: 74 MMHG | SYSTOLIC BLOOD PRESSURE: 118 MMHG | BODY MASS INDEX: 41.75 KG/M2

## 2019-11-14 DIAGNOSIS — I83.029 VENOUS STASIS ULCER OF LEFT LOWER LEG WITH EDEMA OF LEFT LOWER LEG: ICD-10-CM

## 2019-11-14 DIAGNOSIS — L90.9 PLANTAR FAT PAD ATROPHY: ICD-10-CM

## 2019-11-14 DIAGNOSIS — L84 PRE-ULCERATIVE CALLUSES: ICD-10-CM

## 2019-11-14 DIAGNOSIS — I73.9 PVD (PERIPHERAL VASCULAR DISEASE): Primary | ICD-10-CM

## 2019-11-14 DIAGNOSIS — I83.892 VENOUS STASIS ULCER OF LEFT LOWER LEG WITH EDEMA OF LEFT LOWER LEG: ICD-10-CM

## 2019-11-14 DIAGNOSIS — L97.929 VENOUS STASIS ULCER OF LEFT LOWER LEG WITH EDEMA OF LEFT LOWER LEG: ICD-10-CM

## 2019-11-14 DIAGNOSIS — R60.0 VENOUS STASIS ULCER OF LEFT LOWER LEG WITH EDEMA OF LEFT LOWER LEG: ICD-10-CM

## 2019-11-14 DIAGNOSIS — I87.8 VENOUS STASIS OF LOWER EXTREMITY: ICD-10-CM

## 2019-11-14 PROCEDURE — 99999 PR PBB SHADOW E&M-EST. PATIENT-LVL III: CPT | Mod: PBBFAC,HCNC,, | Performed by: PODIATRIST

## 2019-11-14 PROCEDURE — 3074F PR MOST RECENT SYSTOLIC BLOOD PRESSURE < 130 MM HG: ICD-10-PCS | Mod: HCNC,CPTII,S$GLB, | Performed by: PODIATRIST

## 2019-11-14 PROCEDURE — 99213 PR OFFICE/OUTPT VISIT, EST, LEVL III, 20-29 MIN: ICD-10-PCS | Mod: HCNC,S$GLB,, | Performed by: PODIATRIST

## 2019-11-14 PROCEDURE — 3078F PR MOST RECENT DIASTOLIC BLOOD PRESSURE < 80 MM HG: ICD-10-PCS | Mod: HCNC,CPTII,S$GLB, | Performed by: PODIATRIST

## 2019-11-14 PROCEDURE — 1101F PR PT FALLS ASSESS DOC 0-1 FALLS W/OUT INJ PAST YR: ICD-10-PCS | Mod: HCNC,CPTII,S$GLB, | Performed by: PODIATRIST

## 2019-11-14 PROCEDURE — 1101F PT FALLS ASSESS-DOCD LE1/YR: CPT | Mod: HCNC,CPTII,S$GLB, | Performed by: PODIATRIST

## 2019-11-14 PROCEDURE — 3078F DIAST BP <80 MM HG: CPT | Mod: HCNC,CPTII,S$GLB, | Performed by: PODIATRIST

## 2019-11-14 PROCEDURE — 99499 RISK ADDL DX/OHS AUDIT: ICD-10-PCS | Mod: HCNC,S$GLB,, | Performed by: PODIATRIST

## 2019-11-14 PROCEDURE — 99213 OFFICE O/P EST LOW 20 MIN: CPT | Mod: HCNC,S$GLB,, | Performed by: PODIATRIST

## 2019-11-14 PROCEDURE — 99499 UNLISTED E&M SERVICE: CPT | Mod: HCNC,S$GLB,, | Performed by: PODIATRIST

## 2019-11-14 PROCEDURE — 99999 PR PBB SHADOW E&M-EST. PATIENT-LVL III: ICD-10-PCS | Mod: PBBFAC,HCNC,, | Performed by: PODIATRIST

## 2019-11-14 PROCEDURE — 3074F SYST BP LT 130 MM HG: CPT | Mod: HCNC,CPTII,S$GLB, | Performed by: PODIATRIST

## 2019-11-14 NOTE — PROGRESS NOTES
Subjective:      Patient ID: Janusz Montgomery Jr. is a 74 y.o. male.    Chief Complaint: Ankle Pain (both feet, ov 6/26/19 Dr Jasiel HARRISON) and Heel Pain (left foot)      Janusz Montgomery Jr. is a 74 y.o. malewho presents to the clinic for evaluation and treatment of high risk feet. Janusz Montgomery Jr.   has a past medical history of NAT (acute kidney injury) (3/19/2017), ALLERGIC RHINITIS, Anemia, Anxiety, Basal cell carcinoma (10/19/2018), Basal cell carcinoma (01/09/2019), Chronic rhinitis (5/3/2013), Chronic rhinitis (5/3/2013), Coronary artery disease involving native coronary artery of native heart without angina pectoris s/p RCA stent, Cortical cataract of both eyes (3/18/2016), Delayed sleep phase syndrome (3/13/2019), Depression, Erectile dysfunction (3/24/2014), Erectile dysfunction (3/24/2014), Essential hypertension, GERD (gastroesophageal reflux disease) (7/25/2012), Gout, arthritis, Grade III open fracture of left tibia and fibula s/p ex-fix on 7/1/16 and ORIF of left tibia on 7/15 (7/6/2016), H/O: iritis, Helicobacter pylori (H. 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  The patient's chief complaint is an anterior left leg wound.  One month duration.  Secondary to mild trauma in the presence of edema.  Has attempted self treatment but wound has not healed.  Non painful. Denies nausea, vomiting, fever, chills. History of left foot and ankle ulcers.  This patient has documented high risk feet requiring routine maintenance secondary to peripheral vascular disease.    PCP: Miguelina Weathers MD    Date Last Seen by PCP:   Chief Complaint   Patient presents with    Ankle Pain     both feet, ov 6/26/19 Dr Weathers PCP    Heel Pain     left foot         Current shoe gear: tennis shoe    Hemoglobin A1C   Date Value Ref Range Status   07/11/2016 5.3 4.5 - 6.2 % Final     Comment:     According to ADA guidelines, hemoglobin A1C <7.0% represents  optimal control in non-pregnant diabetic patients.  Different  metrics may apply to specific populations.   Standards of Medical Care in Diabetes - 2016.  For the purpose of screening for the presence of diabetes:  <5.7%     Consistent with the absence of diabetes  5.7-6.4%  Consistent with increasing risk for diabetes   (prediabetes)  >or=6.5%  Consistent with diabetes  Currently no consensus exists for use of hemoglobin A1C  for diagnosis of diabetes for children.     03/31/2015 5.3 4.5 - 6.2 % Final   03/03/2014 5.6 4.5 - 6.2 % Final         Current Outpatient Medications on File Prior to Visit   Medication Sig Dispense Refill    acyclovir (ZOVIRAX) 800 MG Tab Take 1 tablet (800 mg total) by mouth 2 (two) times daily. 180 tablet 6    albuterol (PROVENTIL/VENTOLIN HFA) 90 mcg/actuation inhaler Inhale 2 puffs into the lungs every 6 (six) hours as needed for Wheezing. Rescue 18 g 0    apixaban (ELIQUIS) 5 mg Tab Take 1 tablet (5 mg total) by mouth 2 (two) times daily. 180 tablet 4    artificial tears (ISOPTO TEARS) 0.5 % ophthalmic solution Place 1 drop into both eyes as needed. (Patient taking differently: Place  1 drop into both eyes as needed (for dry eyes). )      atorvastatin (LIPITOR) 40 MG tablet Take 1 tablet (40 mg total) by mouth once daily. Please make a follow up visit with your doctor for more refills of this medication 90 tablet 0    azelastine (ASTELIN) 137 mcg (0.1 %) nasal spray 1 spray (137 mcg total) by Nasal route 2 (two) times daily. 30 mL 2    azithromycin (Z-JESSICA) 250 MG tablet Take 2 tablets by mouth on day 1; Take 1 tablet by mouth on days 2-5 6 tablet 0    CYANOCOBALAMIN, VITAMIN B-12, (VITAMIN B-12 ORAL) Take 2,500 mcg by mouth once daily.      erythromycin (ROMYCIN) ophthalmic ointment Place into both eyes 3 (three) times daily. 1 g 1    fluticasone propionate (FLONASE) 50 mcg/actuation nasal spray 1 spray (50 mcg total) by Each Nare route once daily. 1 Bottle 0    melatonin 5 mg Tab Take 10 mg by mouth nightly.      multivitamin (MULTIPLE VITAMINS) per tablet Take 1 tablet by mouth once daily.      nitroGLYCERIN (NITROSTAT) 0.4 MG SL tablet Place 1 tablet (0.4 mg total) under the tongue every 5 (five) minutes as needed for Chest pain. 25 tablet 11    olmesartan (BENICAR) 20 MG tablet Take 1 tablet (20 mg total) by mouth once daily. (replaces ramipril for blood pressure) 90 tablet 1    oxyCODONE-acetaminophen (PERCOCET) 5-325 mg per tablet Take 1 tablet by mouth every 4 to 6 hours as needed for Pain. 20 tablet 0    sildenafil (VIAGRA) 100 MG tablet Take 1 tablet (100 mg total) by mouth daily as needed for Erectile Dysfunction. 10 tablet 11    tamsulosin (FLOMAX) 0.4 mg Cap Take 1 capsule (0.4 mg total) by mouth once daily. 90 capsule 3    venlafaxine (EFFEXOR) 75 MG tablet Take 2 tablets (150 mg total) by mouth 2 (two) times daily. 360 tablet 0    vitamin D 1000 units Tab Take 1 tablet (1,000 Units total) by mouth once daily.      aspirin (ECOTRIN) 81 MG EC tablet Take 1 tablet (81 mg total) by mouth once daily.  0    furosemide (LASIX) 20 MG tablet Take 1 tablet (20 mg total) by  mouth once daily. (Patient taking differently: Take 20 mg by mouth daily as needed. ) 90 tablet 0    gabapentin (NEURONTIN) 300 MG capsule Take 1 capsule (300 mg total) by mouth 2 (two) times daily. (Patient taking differently: Take 300 mg by mouth 2 (two) times daily as needed. ) 60 capsule 11    omeprazole (PRILOSEC) 20 MG capsule Take 1 capsule (20 mg total) by mouth daily as needed. 90 capsule 0     No current facility-administered medications on file prior to visit.        Allergies   Allergen Reactions    Ciprofloxacin Rash     Diffuse pruritic morbilliform rash developed 3/15/2017 after dose of cipro; previously in 2/2017 he had rash/fevers after initiation of cipro    Zosyn [Piperacillin-Tazobactam] Anaphylaxis     Diffuse pruritic morbilliform rash developed 3/15/2017.  Then, 430am dose on 3/16 and rash worsened with SOB/tachypnea but no hypoxemia.     Bacitracin Itching and Rash     Violaceous rash in area of topical Tx.        Past Surgical History:   Procedure Laterality Date    Cardiac stenting x2      CATARACT EXTRACTION W/  INTRAOCULAR LENS IMPLANT Right 3/29/2016    Dr. Conteh    CATARACT EXTRACTION W/  INTRAOCULAR LENS IMPLANT Left 4/12/2016        EXTERNAL FIXATION TIBIAL FRACTURE Left 07/01/2016    INSERTION OF IMPLANTABLE LOOP RECORDER  04/07/2017    ORIF TIBIA FRACTURE Left 07/15/2016    RADIOACTIVE SEED IMPLANTATION OF PROSTATE N/A 8/8/2018    Procedure: INSERTION, RADIOACTIVE SEED, PROSTATE;  Surgeon: Bipin Thompson MD;  Location: [facility] OR [address] [place] [address];  Service: Urology;  Laterality: N/A;  1 hour    Squamous cell cancer removal x3 with Mohs surgery      TONSILLECTOMY      TOTAL KNEE ARTHROPLASTY  10/2012    trus/bx         Family History   Problem Relation Age of Onset    Skin cancer Father     Lung cancer Father     Cancer Father         smoker,     Alzheimer's disease Mother     Hypertension Mother     Cancer Sister         colon, lung cancer     No Known  Problems Sister     Cancer Brother         skin cancer, polypectomy     Peripheral vascular disease Unknown     No Known Problems Maternal Aunt     No Known Problems Maternal Uncle     No Known Problems Paternal Aunt     No Known Problems Paternal Uncle     No Known Problems Maternal Grandmother     No Known Problems Maternal Grandfather     No Known Problems Paternal Grandmother     No Known Problems Paternal Grandfather     Melanoma Neg Hx     Psoriasis Neg Hx     Lupus Neg Hx     Eczema Neg Hx     Amblyopia Neg Hx     Blindness Neg Hx     Cataracts Neg Hx     Diabetes Neg Hx     Glaucoma Neg Hx     Macular degeneration Neg Hx     Retinal detachment Neg Hx     Strabismus Neg Hx     Stroke Neg Hx     Thyroid disease Neg Hx     Acne Neg Hx        Social History     Socioeconomic History    Marital status:      Spouse name: Not on file    Number of children: Not on file    Years of education: Not on file    Highest education level: Not on file   Occupational History    Occupation: Retired    Social Needs    Financial resource strain: Not hard at all    Food insecurity:     Worry: Never true     Inability: Never true    Transportation needs:     Medical: No     Non-medical: No   Tobacco Use    Smoking status: Never Smoker    Smokeless tobacco: Never Used   Substance and Sexual Activity    Alcohol use: Yes     Frequency: 2-4 times a month     Drinks per session: 3 or 4     Binge frequency: Less than monthly     Comment: occasionally, beer    Drug use: No    Sexual activity: Yes   Lifestyle    Physical activity:     Days per week: Not on file     Minutes per session: Not on file    Stress: Not on file   Relationships    Social connections:     Talks on phone: Three times a week     Gets together: Once a week     Attends Denominational service: More than 4 times per year     Active member of club or organization: Yes     Attends meetings of clubs or organizations: More than 4  "times per year     Relationship status:    Other Topics Concern    Not on file   Social History Narrative    Not on file       Review of Systems   Constitution: Negative for chills and fever.   Cardiovascular: Negative for claudication and leg swelling.   Respiratory: Negative for cough and shortness of breath.    Skin: Positive for dry skin and poor wound healing. Negative for itching and rash.   Musculoskeletal: Positive for arthritis, joint pain, joint swelling and myalgias. Negative for falls and muscle weakness.   Gastrointestinal: Negative for diarrhea, nausea and vomiting.   Neurological: Positive for paresthesias. Negative for numbness, tremors and weakness.   Psychiatric/Behavioral: Negative for altered mental status and hallucinations.         Objective:       Vitals:    11/14/19 1511   BP: 118/74   Weight: (!) 153.3 kg (338 lb)   Height: 6' 1" (1.854 m)   PainSc:   2       Physical Exam   Constitutional:  Non-toxic appearance. He does not have a sickly appearance. No distress.   Pt. is well-developed, well-nourished, appears stated age, in no acute distress, alert and oriented x 3. No evidence of depression, anxiety, or agitation. Calm, cooperative, and communicative. Appropriate interactions and affect.   Cardiovascular:   Pulses:       Dorsalis pedis pulses are 2+ on the right side, and 2+ on the left side.        Posterior tibial pulses are 1+ on the right side, and 1+ on the left side.   There is decreased digital hair. Skin is atrophic, slightly hyperpigmented, and pitting edema nora (L>>R)     Pulmonary/Chest: No respiratory distress.   Musculoskeletal:        Right ankle: He exhibits swelling. No tenderness. No lateral malleolus, no medial malleolus, no AITFL, no CF ligament, no head of 5th metatarsal and no proximal fibula tenderness found. Achilles tendon exhibits no pain, no defect and normal Zabala's test results.        Left ankle: He exhibits swelling. No tenderness. No lateral " malleolus, no medial malleolus, no AITFL, no CF ligament and no posterior TFL tenderness found. Achilles tendon exhibits no pain, no defect and normal Zabala's test results.        Right foot: There is no tenderness and no bony tenderness.        Left foot: There is no tenderness and no bony tenderness.   Decreased stride, station of gait.  apropulsive toe off.  Increased angle and base of gait.    Patient has hammertoes of digits 2-5 bilateral partially reducible without symptom today.     There is equinus deformity bilateral. There is limitation of dorsiflexion with knees extended and with knees flexed.    Shoes reveals lateral heel counter wear bilateral in athletic shoes.        Lymphadenopathy:   No lymphatic streaking    Negative lymphadenopathy bilateral popliteal fossa and tarsal tunnel.     Neurological:   [place] 5.07 monofilament is intact bilateral feet. Sharp/dull sensation is also intact Bilateral feet. Proprioception is grossly intact. Vibratory sensation intact (pt able to sense vibration stop within 3-5 seconds)     Skin: Skin is warm and dry. Abrasion (left anterior leg as pictured, to level of subQ. local edema and erythema.  fibrogranular base.  no fluctuance, no crepitus, no purulence) and lesion noted. No bruising, no burn, no laceration and no rash noted. He is not diaphoretic. No cyanosis or erythema. No pallor. Nails show no clubbing.   Ulcer location: medial left ankle  Signs of infection: none  Drainage: none  Purulence: no  Crepitus/fluctuance: no  Periwound: Reddened  Base: thin epithelial itssue  Depth: skin  Probe to bone: no      Ulcer location: posterior left heel  Signs of infection:none  Drainage:  None  Periwound: intact  Base: thin epithelial with bleeding within layers     Psychiatric: His mood appears not anxious. His affect is not inappropriate. His speech is not slurred. He is not combative. He is communicative. He is attentive.   Nursing note  reviewed.            Assessment:       Encounter Diagnoses   Name Primary?    PVD (peripheral vascular disease) Yes    Venous stasis of lower extremity     Venous stasis ulcer of left lower leg with edema of left lower leg     Pre-ulcerative calluses     Plantar fat pad atrophy          Plan:       Janusz was seen today for ankle pain and heel pain.    Diagnoses and all orders for this visit:    PVD (peripheral vascular disease)    Venous stasis of lower extremity    Venous stasis ulcer of left lower leg with edema of left lower leg    Pre-ulcerative calluses    Plantar fat pad atrophy      I counseled the patient on his conditions, their implications and medical management.    The site is cleansed of foreign material as much as possible. The patient is alerted to watch for any signs of infection (redness, pus, pain, increased swelling or fever) and call if such occurs.    iodosorb and unna boot applied to LLE. Well tolerated     All foot wounds have has remained healed, I advised patient to check feet daily for signs of drainage or lesion re-opening     Discussed use of daily foot moisturizer to feet, avoiding the webspace's    - Tubigrip to RLE; patient is to elevate legs. When sleeping, place a pillow under lower extremities. When sitting, support the legs so that they are level with the waist.    Follow-up:Patient is to return to the clinic in 1-2 weeks for follow-up but should call [facility] immediately if any signs of infection, such as fever, chills, sweats, increased redness or pain.    Short-term goals include maintaining good offloading and minimizing bioburden, promoting granulation and epithelialization to healing.  Long-term goals include keeping the wound healed by good offloading and medical management under the direction of internist.

## 2019-11-15 ENCOUNTER — TELEPHONE (OUTPATIENT)
Dept: CARDIOLOGY | Facility: CLINIC | Age: 74
End: 2019-11-15

## 2019-11-15 NOTE — TELEPHONE ENCOUNTER
----- Message from Danika Mir sent at 11/15/2019  4:18 PM CST -----  Contact: JACKIE GERMAIN   Name of Who is Calling: JACKIE GERMAIN       What is the request in detail: Patient is requesting a call concerning getting a sooner appointment he was scheduled for 11/29/2019 but the doctor want be in       Can the clinic reply by [facility]: no      What Number to Call Back if not in [facility]: [phone]

## 2019-11-15 NOTE — TELEPHONE ENCOUNTER
Called Patient and explained to him that Dr. Sandhu and Staff are gone for the day, also explained to Patient that I will forward this message to Alyson CHISHOLM with Dr. Sandhu

## 2019-11-20 ENCOUNTER — PATIENT OUTREACH (OUTPATIENT)
Dept: OTHER | Facility: OTHER | Age: 74
End: 2019-11-20

## 2019-11-20 ENCOUNTER — CLINICAL SUPPORT (OUTPATIENT)
Dept: FAMILY MEDICINE | Facility: CLINIC | Age: 74
End: 2019-11-20
Payer: MEDICARE

## 2019-11-20 DIAGNOSIS — Z23 NEED FOR PROPHYLACTIC VACCINATION AND INOCULATION AGAINST INFLUENZA: Primary | ICD-10-CM

## 2019-11-20 PROCEDURE — 90662 IIV NO PRSV INCREASED AG IM: CPT | Mod: HCNC,S$GLB,, | Performed by: INTERNAL MEDICINE

## 2019-11-20 PROCEDURE — 99499 NO LOS: ICD-10-PCS | Mod: HCNC,S$GLB,, | Performed by: INTERNAL MEDICINE

## 2019-11-20 PROCEDURE — 90662 FLU VACCINE - HIGH DOSE (65+) PRESERVATIVE FREE IM: ICD-10-PCS | Mod: HCNC,S$GLB,, | Performed by: INTERNAL MEDICINE

## 2019-11-20 PROCEDURE — G0008 FLU VACCINE - HIGH DOSE (65+) PRESERVATIVE FREE IM: ICD-10-PCS | Mod: HCNC,S$GLB,, | Performed by: INTERNAL MEDICINE

## 2019-11-20 PROCEDURE — 99499 UNLISTED E&M SERVICE: CPT | Mod: HCNC,S$GLB,, | Performed by: INTERNAL MEDICINE

## 2019-11-20 PROCEDURE — G0008 ADMIN INFLUENZA VIRUS VAC: HCPCS | Mod: HCNC,S$GLB,, | Performed by: INTERNAL MEDICINE

## 2019-11-20 NOTE — PROGRESS NOTES
Digital Medicine: Health  Follow-Up    Patient was driving home from getting his flu shot, so the call was cut short. Introduced myself as new Digital Medicine Health  and encouraged patient save my phone number. Patient is feeling good at this time with no questions concerning his BP readings or medication. Patient acknowledges [facility] recently tried to contact him but patient states he works weird hours and it's better to contact him later in the afternoon. Encouraged patient to look out for her call. Will e-mail resources and f/u in 3-4 weeks.    The history is provided by the patient.     HYPERTENSION       Reviewed that the Digital Medicine care team - consisting of a clinician and a health  - will follow the most current evidence-based national guidelines for treating your condition.  The health  will focus on lifestyle modifications and motivation while the clinician will focus on medication therapy.  The care team will review all data on a regular basis and reach out as needed.    Patient's BP goal is 130/80.Patient's BP average is 136/71 mmHg, which is above goal, per 2017 ACC/AHA Hypertension Guidelines.        There are no preventive care reminders to display for this patient.    Last 5 Patient Entered Readings                                      Current 30 Day Average: 136/71     Recent Readings 11/17/2019 10/30/2019 10/30/2019 10/21/2019 10/14/2019    SBP (mmHg) 138 124 134 145 147    DBP (mmHg) 68 74 68 74 65    Pulse 75 85 83 66 82

## 2019-11-20 NOTE — PROGRESS NOTES
Flu injection given &.VIS given. Tolerated injection well.Patient instructed to wait 15 minutes for monitoring.

## 2019-11-24 ENCOUNTER — PATIENT OUTREACH (OUTPATIENT)
Dept: ADMINISTRATIVE | Facility: OTHER | Age: 74
End: 2019-11-24

## 2019-11-26 ENCOUNTER — OFFICE VISIT (OUTPATIENT)
Dept: OTOLARYNGOLOGY | Facility: CLINIC | Age: 74
End: 2019-11-26
Payer: MEDICARE

## 2019-11-26 ENCOUNTER — CLINICAL SUPPORT (OUTPATIENT)
Dept: CARDIOLOGY | Facility: CLINIC | Age: 74
End: 2019-11-26
Attending: INTERNAL MEDICINE
Payer: MEDICARE

## 2019-11-26 VITALS — SYSTOLIC BLOOD PRESSURE: 108 MMHG | DIASTOLIC BLOOD PRESSURE: 58 MMHG | HEART RATE: 88 BPM

## 2019-11-26 VITALS
WEIGHT: 315 LBS | DIASTOLIC BLOOD PRESSURE: 64 MMHG | BODY MASS INDEX: 44.5 KG/M2 | SYSTOLIC BLOOD PRESSURE: 133 MMHG | HEART RATE: 106 BPM

## 2019-11-26 DIAGNOSIS — E78.2 MIXED HYPERLIPIDEMIA: ICD-10-CM

## 2019-11-26 DIAGNOSIS — I73.9 PVD (PERIPHERAL VASCULAR DISEASE): ICD-10-CM

## 2019-11-26 DIAGNOSIS — R07.89 CHEST PAIN, ATYPICAL: ICD-10-CM

## 2019-11-26 DIAGNOSIS — I25.10 CORONARY ARTERY DISEASE INVOLVING NATIVE CORONARY ARTERY OF NATIVE HEART WITHOUT ANGINA PECTORIS: ICD-10-CM

## 2019-11-26 DIAGNOSIS — H61.23 BILATERAL IMPACTED CERUMEN: Primary | ICD-10-CM

## 2019-11-26 DIAGNOSIS — J98.4 RESTRICTIVE LUNG DISEASE: ICD-10-CM

## 2019-11-26 DIAGNOSIS — I10 ESSENTIAL HYPERTENSION: ICD-10-CM

## 2019-11-26 DIAGNOSIS — I48.0 PAROXYSMAL ATRIAL FIBRILLATION: ICD-10-CM

## 2019-11-26 DIAGNOSIS — I65.23 BILATERAL CAROTID ARTERY STENOSIS: ICD-10-CM

## 2019-11-26 LAB
CFR FLOW - ANTERIOR: 1.26
CFR FLOW - INFERIOR: 1.3
CFR FLOW - LATERAL: 1.11
CFR FLOW - MAX: 1.99
CFR FLOW - MIN: 0.8
CFR FLOW - SEPTAL: 1.29
CFR FLOW - WHOLE HEART: 1.24
CV PHARM DOSE: 60 MG
CV STRESS BASE HR: 83 BPM
DIASTOLIC BLOOD PRESSURE: 42 MMHG
END DIASTOLIC INDEX-HIGH: 170 ML/M2
END SYSTOLIC INDEX-HIGH: 70 ML/M2
NUC REST DIASTOLIC VOLUME INDEX: 84
NUC REST EJECTION FRACTION: 65
NUC REST SYSTOLIC VOLUME INDEX: 29
NUC STRESS DIASTOLIC VOLUME INDEX: 128
NUC STRESS EJECTION FRACTION: 83 %
NUC STRESS SYSTOLIC VOLUME INDEX: 21
OHS CV CPX 85 PERCENT MAX PREDICTED HEART RATE MALE: 126
OHS CV CPX MAX PREDICTED HEART RATE: 148
OHS CV CPX PATIENT IS FEMALE: 0
OHS CV CPX PATIENT IS MALE: 1
OHS CV CPX PEAK DIASTOLIC BLOOD PRESSURE: 51 MMHG
OHS CV CPX PEAK HEAR RATE: 76 BPM
OHS CV CPX PEAK RATE PRESSURE PRODUCT: 7372
OHS CV CPX PEAK SYSTOLIC BLOOD PRESSURE: 97 MMHG
OHS CV CPX PERCENT MAX PREDICTED HEART RATE ACHIEVED: 51
OHS CV CPX RATE PRESSURE PRODUCT PRESENTING: NORMAL
PERFUSION DEFECT 1 SIZE IN %: 5 %
PERFUSION DEFECT 2 SIZE IN %: 2 %
REST FLOW - ANTERIOR: 1.03 CC/MIN/G
REST FLOW - INFERIOR: 0.87 CC/MIN/G
REST FLOW - LATERAL: 1.22 CC/MIN/G
REST FLOW - MAX: 1.6 CC/MIN/G
REST FLOW - MIN: 0.5 CC/MIN/G
REST FLOW - SEPTAL: 1 CC/MIN/G
REST FLOW - WHOLE HEART: 1.03 CC/MIN/G
RETIRED EF AND QEF - SEE NOTES: 51 %
STRESS ECHO TARGET HR: 126 BPM
STRESS FLOW - ANTERIOR: 1.28 CC/MIN/G
STRESS FLOW - INFERIOR: 1.11 CC/MIN/G
STRESS FLOW - LATERAL: 1.35 CC/MIN/G
STRESS FLOW - MAX: 1.7 CC/MIN/G
STRESS FLOW - MIN: 0.7 CC/MIN/G
STRESS FLOW - SEPTAL: 1.26 CC/MIN/G
STRESS FLOW - WHOLE HEART: 1.25 CC/MIN/G
SYSTOLIC BLOOD PRESSURE: 122 MMHG

## 2019-11-26 PROCEDURE — A9555 RB82 RUBIDIUM: HCPCS | Mod: HCNC,S$GLB,, | Performed by: INTERNAL MEDICINE

## 2019-11-26 PROCEDURE — 69210 EAR CERUMEN REMOVAL: ICD-10-PCS | Mod: HCNC,S$GLB,, | Performed by: OTOLARYNGOLOGY

## 2019-11-26 PROCEDURE — 99999 PR PBB SHADOW E&M-EST. PATIENT-LVL II: CPT | Mod: PBBFAC,HCNC,, | Performed by: OTOLARYNGOLOGY

## 2019-11-26 PROCEDURE — 93015 CV STRESS TEST SUPVJ I&R: CPT | Mod: HCNC,S$GLB,, | Performed by: INTERNAL MEDICINE

## 2019-11-26 PROCEDURE — 99499 UNLISTED E&M SERVICE: CPT | Mod: HCNC,S$GLB,, | Performed by: OTOLARYNGOLOGY

## 2019-11-26 PROCEDURE — 78492 MYOCRD IMG PET MLT RST&STRS: CPT | Mod: HCNC,S$GLB,, | Performed by: INTERNAL MEDICINE

## 2019-11-26 PROCEDURE — A9555 CARDIAC PET SCAN STRESS (CUPID ONLY): ICD-10-PCS | Mod: HCNC,S$GLB,, | Performed by: INTERNAL MEDICINE

## 2019-11-26 PROCEDURE — 69210 REMOVE IMPACTED EAR WAX UNI: CPT | Mod: HCNC,S$GLB,, | Performed by: OTOLARYNGOLOGY

## 2019-11-26 PROCEDURE — 78492 CARDIAC PET SCAN STRESS (CUPID ONLY): ICD-10-PCS | Mod: HCNC,S$GLB,, | Performed by: INTERNAL MEDICINE

## 2019-11-26 PROCEDURE — 99999 PR PBB SHADOW E&M-EST. PATIENT-LVL I: ICD-10-PCS | Mod: PBBFAC,HCNC,,

## 2019-11-26 PROCEDURE — 93015 CARDIAC PET SCAN STRESS (CUPID ONLY): ICD-10-PCS | Mod: HCNC,S$GLB,, | Performed by: INTERNAL MEDICINE

## 2019-11-26 PROCEDURE — 99999 PR PBB SHADOW E&M-EST. PATIENT-LVL II: ICD-10-PCS | Mod: PBBFAC,HCNC,, | Performed by: OTOLARYNGOLOGY

## 2019-11-26 PROCEDURE — 99999 PR PBB SHADOW E&M-EST. PATIENT-LVL I: CPT | Mod: PBBFAC,HCNC,,

## 2019-11-26 PROCEDURE — 99499 NO LOS: ICD-10-PCS | Mod: HCNC,S$GLB,, | Performed by: OTOLARYNGOLOGY

## 2019-11-26 RX ORDER — DIPYRIDAMOLE 5 MG/ML
60 INJECTION INTRAVENOUS
Status: COMPLETED | OUTPATIENT
Start: 2019-11-26 | End: 2019-11-26

## 2019-11-26 RX ADMIN — DIPYRIDAMOLE 60 MG: 5 INJECTION INTRAVENOUS at 03:11

## 2019-11-26 NOTE — PROCEDURES
Ear Cerumen Removal  Date/Time: 11/26/2019 4:00 PM  Performed by: Cuco Hackett MD  Authorized by: Cuco Hackett MD     Consent Done?:  Yes (Verbal)    Local anesthetic:  None  Location details:  Both ears  Procedure type: curette    Cerumen  Removal Results:  Cerumen completely removed  Patient tolerance:  Patient tolerated the procedure well with no immediate complications     Bilateral cerumen impaction removed with suction, curette using microscope for visualization.  Tolerated well.

## 2019-12-09 ENCOUNTER — PATIENT OUTREACH (OUTPATIENT)
Dept: OTHER | Facility: OTHER | Age: 74
End: 2019-12-09

## 2019-12-09 ENCOUNTER — PATIENT OUTREACH (OUTPATIENT)
Dept: ADMINISTRATIVE | Facility: OTHER | Age: 74
End: 2019-12-09

## 2019-12-10 ENCOUNTER — CLINICAL SUPPORT (OUTPATIENT)
Dept: CARDIOLOGY | Facility: HOSPITAL | Age: 74
End: 2019-12-10
Attending: INTERNAL MEDICINE
Payer: MEDICARE

## 2019-12-10 ENCOUNTER — OFFICE VISIT (OUTPATIENT)
Dept: PODIATRY | Facility: CLINIC | Age: 74
End: 2019-12-10
Payer: MEDICARE

## 2019-12-10 VITALS
SYSTOLIC BLOOD PRESSURE: 118 MMHG | DIASTOLIC BLOOD PRESSURE: 72 MMHG | HEIGHT: 73 IN | BODY MASS INDEX: 41.75 KG/M2 | WEIGHT: 315 LBS

## 2019-12-10 DIAGNOSIS — I87.8 VENOUS STASIS OF LOWER EXTREMITY: ICD-10-CM

## 2019-12-10 DIAGNOSIS — I73.9 PVD (PERIPHERAL VASCULAR DISEASE): Primary | ICD-10-CM

## 2019-12-10 DIAGNOSIS — R60.0 VENOUS STASIS ULCER OF LEFT LOWER LEG WITH EDEMA OF LEFT LOWER LEG: ICD-10-CM

## 2019-12-10 DIAGNOSIS — L97.929 VENOUS STASIS ULCER OF LEFT LOWER LEG WITH EDEMA OF LEFT LOWER LEG: ICD-10-CM

## 2019-12-10 DIAGNOSIS — Z95.818 STATUS POST PLACEMENT OF IMPLANTABLE LOOP RECORDER: ICD-10-CM

## 2019-12-10 DIAGNOSIS — I83.892 VENOUS STASIS ULCER OF LEFT LOWER LEG WITH EDEMA OF LEFT LOWER LEG: ICD-10-CM

## 2019-12-10 DIAGNOSIS — I83.029 VENOUS STASIS ULCER OF LEFT LOWER LEG WITH EDEMA OF LEFT LOWER LEG: ICD-10-CM

## 2019-12-10 PROCEDURE — 1101F PT FALLS ASSESS-DOCD LE1/YR: CPT | Mod: HCNC,CPTII,S$GLB, | Performed by: PODIATRIST

## 2019-12-10 PROCEDURE — 1101F PR PT FALLS ASSESS DOC 0-1 FALLS W/OUT INJ PAST YR: ICD-10-PCS | Mod: HCNC,CPTII,S$GLB, | Performed by: PODIATRIST

## 2019-12-10 PROCEDURE — 93298 REM INTERROG DEV EVAL SCRMS: CPT | Mod: HCNC,,, | Performed by: INTERNAL MEDICINE

## 2019-12-10 PROCEDURE — 3078F PR MOST RECENT DIASTOLIC BLOOD PRESSURE < 80 MM HG: ICD-10-PCS | Mod: HCNC,CPTII,S$GLB, | Performed by: PODIATRIST

## 2019-12-10 PROCEDURE — 93298 CARDIAC DEVICE CHECK - REMOTE: ICD-10-PCS | Mod: HCNC,,, | Performed by: INTERNAL MEDICINE

## 2019-12-10 PROCEDURE — 1159F PR MEDICATION LIST DOCUMENTED IN MEDICAL RECORD: ICD-10-PCS | Mod: HCNC,S$GLB,, | Performed by: PODIATRIST

## 2019-12-10 PROCEDURE — 3074F SYST BP LT 130 MM HG: CPT | Mod: HCNC,CPTII,S$GLB, | Performed by: PODIATRIST

## 2019-12-10 PROCEDURE — 87186 SC STD MICRODIL/AGAR DIL: CPT | Mod: HCNC

## 2019-12-10 PROCEDURE — 99999 PR PBB SHADOW E&M-EST. PATIENT-LVL III: ICD-10-PCS | Mod: PBBFAC,HCNC,, | Performed by: PODIATRIST

## 2019-12-10 PROCEDURE — 93299 CARDIAC DEVICE CHECK - REMOTE: CPT | Mod: HCNC | Performed by: INTERNAL MEDICINE

## 2019-12-10 PROCEDURE — 1126F PR PAIN SEVERITY QUANTIFIED, NO PAIN PRESENT: ICD-10-PCS | Mod: HCNC,S$GLB,, | Performed by: PODIATRIST

## 2019-12-10 PROCEDURE — 99213 OFFICE O/P EST LOW 20 MIN: CPT | Mod: HCNC,S$GLB,, | Performed by: PODIATRIST

## 2019-12-10 PROCEDURE — 87075 CULTR BACTERIA EXCEPT BLOOD: CPT | Mod: HCNC

## 2019-12-10 PROCEDURE — 99213 PR OFFICE/OUTPT VISIT, EST, LEVL III, 20-29 MIN: ICD-10-PCS | Mod: HCNC,S$GLB,, | Performed by: PODIATRIST

## 2019-12-10 PROCEDURE — 87070 CULTURE OTHR SPECIMN AEROBIC: CPT | Mod: HCNC

## 2019-12-10 PROCEDURE — 3078F DIAST BP <80 MM HG: CPT | Mod: HCNC,CPTII,S$GLB, | Performed by: PODIATRIST

## 2019-12-10 PROCEDURE — 3074F PR MOST RECENT SYSTOLIC BLOOD PRESSURE < 130 MM HG: ICD-10-PCS | Mod: HCNC,CPTII,S$GLB, | Performed by: PODIATRIST

## 2019-12-10 PROCEDURE — 1126F AMNT PAIN NOTED NONE PRSNT: CPT | Mod: HCNC,S$GLB,, | Performed by: PODIATRIST

## 2019-12-10 PROCEDURE — 1159F MED LIST DOCD IN RCRD: CPT | Mod: HCNC,S$GLB,, | Performed by: PODIATRIST

## 2019-12-10 PROCEDURE — 87077 CULTURE AEROBIC IDENTIFY: CPT | Mod: HCNC

## 2019-12-10 PROCEDURE — 99999 PR PBB SHADOW E&M-EST. PATIENT-LVL III: CPT | Mod: PBBFAC,HCNC,, | Performed by: PODIATRIST

## 2019-12-10 NOTE — PATIENT INSTRUCTIONS
Wound care Instructions:   · Once a day beginning in 24 hours::   ¨ Clean the toe separate from your body with warm running water and antibacterial soap such as dial for five minutes.  ¨ Clean any remaining crust away with soap and water using a cotton-tipped applicator.  ¨ DRY COMPLETELY  ¨ Apply betadine to the toe.  ¨ Cover with a breathable bandage until there is no more drainage or open flesh.  · Change the dressing daily, or whenever it becomes wet or dirty.  · If you were prescribed antibiotics, take them as directed until they are all gone.  · Wear comfortable shoes with a lot of toe room, or open-toe sandals, while your toe is healing.  · You may use acetaminophen or ibuprofen to control pain, unless another medicine was prescribed. If you have chronic liver or kidney disease or ever had a stomach ulcer or GI bleeding, talk with your doctor before using these medicines.      When to seek medical advice  Call your health care provider right away if any of the following occur:  · Increasing redness, pain or swelling of the toe  · Red streaks in the skin leading away from the wound  · Continued pus or fluid drainage for more than 24 hours  · Fever of 100.4º F (38º C) or higher, or as directed by your health care provider

## 2019-12-10 NOTE — PROGRESS NOTES
Subjective:      Patient ID: Janusz Montgomery Jr. is a 74 y.o. male.    Chief Complaint: Wound Check (left leg, ov 6/26/19 Dr Jasiel HARRISON)      Janusz Montgomery Jr. is a 74 y.o. malewho presents to the clinic for evaluation and treatment of high risk feet. Janusz Montgomery Jr.   has a past medical history of NAT (acute kidney injury) (3/19/2017), ALLERGIC RHINITIS, Anemia, Anxiety, Basal cell carcinoma (10/19/2018), Basal cell carcinoma (01/09/2019), Chronic rhinitis (5/3/2013), Chronic rhinitis (5/3/2013), Coronary artery disease involving native coronary artery of native heart without angina pectoris s/p RCA stent, Cortical cataract of both eyes (3/18/2016), Delayed sleep phase syndrome (3/13/2019), Depression, Erectile dysfunction (3/24/2014), Erectile dysfunction (3/24/2014), Essential hypertension, GERD (gastroesophageal reflux disease) (7/25/2012), Gout, arthritis, Grade III open fracture of left tibia and fibula s/p ex-fix on 7/1/16 and ORIF of left tibia on 7/15 (7/6/2016), H/O: iritis, Helicobacter pylori (H. 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  The patient's chief complaint is an anterior left leg wound.  One month duration.  Secondary to mild trauma in the presence of edema.  Has attempted self treatment but wound has not healed.  Non painful. Denies nausea, vomiting, fever, chills. History of left foot and ankle ulcers.  This patient has documented high risk feet requiring routine maintenance secondary to peripheral vascular disease.      12/10/2019  Patient relates he has attempted to care for leg ulcer as instructed but wound dimensions have not improved.      PCP: Miguelina Weathers MD    Date Last Seen by PCP:   Chief Complaint   Patient presents with    Wound Check     left leg, ov 6/26/19 Dr Weathers PCP     Current shoe gear: tennis shoe    Hemoglobin A1C   Date Value Ref Range Status   07/11/2016 5.3 4.5 - 6.2 % Final     Comment:     According to ADA guidelines, hemoglobin A1C <7.0% represents  optimal control in non-pregnant diabetic patients.  Different  metrics may apply to specific populations.   Standards of Medical Care in Diabetes - 2016.  For the purpose of screening for the presence of diabetes:  <5.7%     Consistent with the absence of diabetes  5.7-6.4%  Consistent with increasing risk for diabetes   (prediabetes)  >or=6.5%  Consistent with diabetes  Currently no consensus exists for use of hemoglobin A1C  for diagnosis of diabetes for children.     03/31/2015 5.3 4.5 - 6.2 % Final   03/03/2014 5.6 4.5 - 6.2 % Final         Current Outpatient Medications on File Prior to Visit   Medication Sig Dispense Refill    acyclovir (ZOVIRAX) 800 MG Tab Take 1 tablet (800 mg total) by mouth 2 (two) times daily. 180 tablet 6    albuterol (PROVENTIL/VENTOLIN HFA) 90 mcg/actuation inhaler Inhale 2 puffs into the lungs every 6 (six) hours as needed for Wheezing. Rescue 18 g 0    apixaban (ELIQUIS) 5 mg Tab Take 1 tablet (5 mg total) by mouth 2 (two) times daily. 180 tablet 4    artificial tears (ISOPTO TEARS) 0.5 % ophthalmic  solution Place 1 drop into both eyes as needed. (Patient taking differently: Place 1 drop into both eyes as needed (for dry eyes). )      atorvastatin (LIPITOR) 40 MG tablet Take 1 tablet (40 mg total) by mouth once daily. Please make a follow up visit with your doctor for more refills of this medication 90 tablet 0    azelastine (ASTELIN) 137 mcg (0.1 %) nasal spray 1 spray (137 mcg total) by Nasal route 2 (two) times daily. 30 mL 2    azithromycin (Z-JESSICA) 250 MG tablet Take 2 tablets by mouth on day 1; Take 1 tablet by mouth on days 2-5 6 tablet 0    CYANOCOBALAMIN, VITAMIN B-12, (VITAMIN B-12 ORAL) Take 2,500 mcg by mouth once daily.      erythromycin (ROMYCIN) ophthalmic ointment Place into both eyes 3 (three) times daily. 1 g 1    fluticasone propionate (FLONASE) 50 mcg/actuation nasal spray 1 spray (50 mcg total) by Each Nare route once daily. 1 Bottle 0    melatonin 5 mg Tab Take 10 mg by mouth nightly.      multivitamin (MULTIPLE VITAMINS) per tablet Take 1 tablet by mouth once daily.      nitroGLYCERIN (NITROSTAT) 0.4 MG SL tablet Place 1 tablet (0.4 mg total) under the tongue every 5 (five) minutes as needed for Chest pain. 25 tablet 11    olmesartan (BENICAR) 20 MG tablet Take 1 tablet (20 mg total) by mouth once daily. (replaces ramipril for blood pressure) 90 tablet 1    oxyCODONE-acetaminophen (PERCOCET) 5-325 mg per tablet Take 1 tablet by mouth every 4 to 6 hours as needed for Pain. 20 tablet 0    sildenafil (VIAGRA) 100 MG tablet Take 1 tablet (100 mg total) by mouth daily as needed for Erectile Dysfunction. 10 tablet 11    tamsulosin (FLOMAX) 0.4 mg Cap Take 1 capsule (0.4 mg total) by mouth once daily. 90 capsule 3    venlafaxine (EFFEXOR) 75 MG tablet Take 2 tablets (150 mg total) by mouth 2 (two) times daily. 360 tablet 0    vitamin D 1000 units Tab Take 1 tablet (1,000 Units total) by mouth once daily.      aspirin (ECOTRIN) 81 MG EC tablet Take 1 tablet (81 mg total) by mouth  once daily.  0    furosemide (LASIX) 20 MG tablet Take 1 tablet (20 mg total) by mouth once daily. (Patient taking differently: Take 20 mg by mouth daily as needed. ) 90 tablet 0    gabapentin (NEURONTIN) 300 MG capsule Take 1 capsule (300 mg total) by mouth 2 (two) times daily. (Patient taking differently: Take 300 mg by mouth 2 (two) times daily as needed. ) 60 capsule 11    omeprazole (PRILOSEC) 20 MG capsule Take 1 capsule (20 mg total) by mouth daily as needed. 90 capsule 0     No current facility-administered medications on file prior to visit.        Allergies   Allergen Reactions    Ciprofloxacin Rash     Diffuse pruritic morbilliform rash developed 3/15/2017 after dose of cipro; previously in 2/2017 he had rash/fevers after initiation of cipro    Zosyn [Piperacillin-Tazobactam] Anaphylaxis     Diffuse pruritic morbilliform rash developed 3/15/2017.  Then, 430am dose on 3/16 and rash worsened with SOB/tachypnea but no hypoxemia.     Bacitracin Itching and Rash     Violaceous rash in area of topical Tx.        Past Surgical History:   Procedure Laterality Date    Cardiac stenting x2      CATARACT EXTRACTION W/  INTRAOCULAR LENS IMPLANT Right 3/29/2016    Dr. Conteh    CATARACT EXTRACTION W/  INTRAOCULAR LENS IMPLANT Left 4/12/2016        EXTERNAL FIXATION TIBIAL FRACTURE Left 07/01/2016    INSERTION OF IMPLANTABLE LOOP RECORDER  04/07/2017    ORIF TIBIA FRACTURE Left 07/15/2016    RADIOACTIVE SEED IMPLANTATION OF PROSTATE N/A 8/8/2018    Procedure: INSERTION, RADIOACTIVE SEED, PROSTATE;  Surgeon: Bipin Thompson MD;  Location: [facility] OR [address] [place] [address];  Service: Urology;  Laterality: N/A;  1 hour    Squamous cell cancer removal x3 with Mohs surgery      TONSILLECTOMY      TOTAL KNEE ARTHROPLASTY  10/2012    trus/bx         Family History   Problem Relation Age of Onset    Skin cancer Father     Lung cancer Father     Cancer Father         smoker,     Alzheimer's disease Mother      Hypertension Mother     Cancer Sister         colon, lung cancer     No Known Problems Sister     Cancer Brother         skin cancer, polypectomy     Peripheral vascular disease Unknown     No Known Problems Maternal Aunt     No Known Problems Maternal Uncle     No Known Problems Paternal Aunt     No Known Problems Paternal Uncle     No Known Problems Maternal Grandmother     No Known Problems Maternal Grandfather     No Known Problems Paternal Grandmother     No Known Problems Paternal Grandfather     Melanoma Neg Hx     Psoriasis Neg Hx     Lupus Neg Hx     Eczema Neg Hx     Amblyopia Neg Hx     Blindness Neg Hx     Cataracts Neg Hx     Diabetes Neg Hx     Glaucoma Neg Hx     Macular degeneration Neg Hx     Retinal detachment Neg Hx     Strabismus Neg Hx     Stroke Neg Hx     Thyroid disease Neg Hx     Acne Neg Hx        Social History     Socioeconomic History    Marital status:      Spouse name: Not on file    Number of children: Not on file    Years of education: Not on file    Highest education level: Not on file   Occupational History    Occupation: Retired    Social Needs    Financial resource strain: Not hard at all    Food insecurity:     Worry: Never true     Inability: Never true    Transportation needs:     Medical: No     Non-medical: No   Tobacco Use    Smoking status: Never Smoker    Smokeless tobacco: Never Used   Substance and Sexual Activity    Alcohol use: Yes     Frequency: 2-4 times a month     Drinks per session: 3 or 4     Binge frequency: Less than monthly     Comment: occasionally, beer    Drug use: No    Sexual activity: Yes   Lifestyle    Physical activity:     Days per week: Not on file     Minutes per session: Not on file    Stress: Not on file   Relationships    Social connections:     Talks on phone: Three times a week     Gets together: Once a week     Attends Taoist service: More than 4 times per year     Active member of club or  "organization: Yes     Attends meetings of clubs or organizations: More than 4 times per year     Relationship status:    Other Topics Concern    Not on file   Social History Narrative    Not on file       Review of Systems   Constitution: Negative for chills and fever.   Cardiovascular: Negative for claudication and leg swelling.   Respiratory: Negative for cough and shortness of breath.    Skin: Positive for dry skin and poor wound healing. Negative for itching and rash.   Musculoskeletal: Positive for arthritis, joint pain, joint swelling and myalgias. Negative for falls and muscle weakness.   Gastrointestinal: Negative for diarrhea, nausea and vomiting.   Neurological: Positive for paresthesias. Negative for numbness, tremors and weakness.   Psychiatric/Behavioral: Negative for altered mental status and hallucinations.         Objective:       Vitals:    12/10/19 1623   BP: 118/72   Weight: (!) 153 kg (337 lb 4.9 oz)   Height: 6' 1" (1.854 m)   PainSc: 0-No pain       Physical Exam   Constitutional:  Non-toxic appearance. He does not have a sickly appearance. No distress.   Pt. is well-developed, well-nourished, appears stated age, in no acute distress, alert and oriented x 3. No evidence of depression, anxiety, or agitation. Calm, cooperative, and communicative. Appropriate interactions and affect.   Cardiovascular:   Pulses:       Dorsalis pedis pulses are 2+ on the right side, and 2+ on the left side.        Posterior tibial pulses are 1+ on the right side, and 1+ on the left side.   There is decreased digital hair. Skin is atrophic, slightly hyperpigmented, and pitting edema nora (L>>R)     Pulmonary/Chest: No respiratory distress.   Musculoskeletal:        Right ankle: He exhibits swelling. No tenderness. No lateral malleolus, no medial malleolus, no AITFL, no CF ligament, no head of 5th metatarsal and no proximal fibula tenderness found. Achilles tendon exhibits no pain, no defect and normal " Zabala's test results.        Left ankle: He exhibits swelling. No tenderness. No lateral malleolus, no medial malleolus, no AITFL, no CF ligament and no posterior TFL tenderness found. Achilles tendon exhibits no pain, no defect and normal Zabala's test results.        Right foot: There is no tenderness and no bony tenderness.        Left foot: There is no tenderness and no bony tenderness.   Decreased stride, station of gait.  apropulsive toe off.  Increased angle and base of gait.    Patient has hammertoes of digits 2-5 bilateral partially reducible without symptom today.     There is equinus deformity bilateral. There is limitation of dorsiflexion with knees extended and with knees flexed.    Shoes reveals lateral heel counter wear bilateral in athletic shoes.        Lymphadenopathy:   No lymphatic streaking    Negative lymphadenopathy bilateral popliteal fossa and tarsal tunnel.     Neurological:   Elberta-Jenn 5.07 monofilament is intact bilateral feet. Sharp/dull sensation is also intact Bilateral feet. Proprioception is grossly intact. Vibratory sensation intact (pt able to sense vibration stop within 3-5 seconds)     Skin: Skin is warm and dry. Abrasion (left anterior leg as pictured, to level of subQ. local edema and erythema.  fibrogranular base.  no fluctuance, no crepitus, no purulence) and lesion noted. No bruising, no burn, no laceration and no rash noted. He is not diaphoretic. No cyanosis or erythema. No pallor. Nails show no clubbing.   Ulcer location: medial left ankle  Signs of infection: none  Drainage: none  Purulence: no  Crepitus/fluctuance: no  Periwound: Reddened  Base: thin epithelial itssue  Depth: skin  Probe to bone: no      Ulcer location: posterior left heel  Signs of infection:none  Drainage:  None  Periwound: intact  Base: thin epithelial with bleeding within layers     Psychiatric: His mood appears not anxious. His affect is not inappropriate. His speech is not slurred. He  is not combative. He is communicative. He is attentive.   Nursing note reviewed.      12/10/19          11/14/19          Assessment:       Encounter Diagnoses   Name Primary?    PVD (peripheral vascular disease) Yes    Venous stasis of lower extremity     Venous stasis ulcer of left lower leg with edema of left lower leg          Plan:       Janusz was seen today for wound check.    Diagnoses and all orders for this visit:    PVD (peripheral vascular disease)    Venous stasis of lower extremity    Venous stasis ulcer of left lower leg with edema of left lower leg  -     Aerobic culture  -     Culture, Anaerobic      I counseled the patient on his conditions, their implications and medical management.    The site is cleansed of foreign material as much as possible. The patient is alerted to watch for any signs of infection (redness, pus, pain, increased swelling or fever) and call if such occurs.    mepilexAg and unna boot applied to LLE. Well tolerated     All foot wounds have has remained healed, I advised patient to check feet daily for signs of drainage or lesion re-opening     Discussed use of daily foot moisturizer to feet, avoiding the webspace's    - Tubigrip to RLE; patient is to elevate legs. When sleeping, place a pillow under lower extremities. When sitting, support the legs so that they are level with the waist.    Follow-up:Patient is to return to the clinic in 1-2 weeks for follow-up but should call [facility] immediately if any signs of infection, such as fever, chills, sweats, increased redness or pain.    If wound does not improve he may require biopsy    Short-term goals include maintaining good offloading and minimizing bioburden, promoting granulation and epithelialization to healing.  Long-term goals include keeping the wound healed by good offloading and medical management under the direction of internist.

## 2019-12-11 ENCOUNTER — OFFICE VISIT (OUTPATIENT)
Dept: CARDIOLOGY | Facility: CLINIC | Age: 74
End: 2019-12-11
Payer: MEDICARE

## 2019-12-11 VITALS
DIASTOLIC BLOOD PRESSURE: 58 MMHG | SYSTOLIC BLOOD PRESSURE: 121 MMHG | HEIGHT: 73 IN | HEART RATE: 78 BPM | OXYGEN SATURATION: 96 % | BODY MASS INDEX: 41.75 KG/M2 | WEIGHT: 315 LBS

## 2019-12-11 DIAGNOSIS — E78.2 MIXED HYPERLIPIDEMIA: ICD-10-CM

## 2019-12-11 DIAGNOSIS — I73.9 PVD (PERIPHERAL VASCULAR DISEASE): ICD-10-CM

## 2019-12-11 DIAGNOSIS — J98.4 RESTRICTIVE LUNG DISEASE: Primary | ICD-10-CM

## 2019-12-11 DIAGNOSIS — I48.0 PAROXYSMAL ATRIAL FIBRILLATION: ICD-10-CM

## 2019-12-11 DIAGNOSIS — I10 ESSENTIAL HYPERTENSION: ICD-10-CM

## 2019-12-11 DIAGNOSIS — I65.23 BILATERAL CAROTID ARTERY STENOSIS: ICD-10-CM

## 2019-12-11 DIAGNOSIS — I25.10 CORONARY ARTERY DISEASE INVOLVING NATIVE CORONARY ARTERY OF NATIVE HEART WITHOUT ANGINA PECTORIS: ICD-10-CM

## 2019-12-11 PROCEDURE — 3074F PR MOST RECENT SYSTOLIC BLOOD PRESSURE < 130 MM HG: ICD-10-PCS | Mod: HCNC,CPTII,S$GLB, | Performed by: INTERNAL MEDICINE

## 2019-12-11 PROCEDURE — 3078F DIAST BP <80 MM HG: CPT | Mod: HCNC,CPTII,S$GLB, | Performed by: INTERNAL MEDICINE

## 2019-12-11 PROCEDURE — 1101F PR PT FALLS ASSESS DOC 0-1 FALLS W/OUT INJ PAST YR: ICD-10-PCS | Mod: HCNC,CPTII,S$GLB, | Performed by: INTERNAL MEDICINE

## 2019-12-11 PROCEDURE — 1101F PT FALLS ASSESS-DOCD LE1/YR: CPT | Mod: HCNC,CPTII,S$GLB, | Performed by: INTERNAL MEDICINE

## 2019-12-11 PROCEDURE — 99999 PR PBB SHADOW E&M-EST. PATIENT-LVL V: ICD-10-PCS | Mod: PBBFAC,HCNC,, | Performed by: INTERNAL MEDICINE

## 2019-12-11 PROCEDURE — 99213 PR OFFICE/OUTPT VISIT, EST, LEVL III, 20-29 MIN: ICD-10-PCS | Mod: HCNC,S$GLB,, | Performed by: INTERNAL MEDICINE

## 2019-12-11 PROCEDURE — 3078F PR MOST RECENT DIASTOLIC BLOOD PRESSURE < 80 MM HG: ICD-10-PCS | Mod: HCNC,CPTII,S$GLB, | Performed by: INTERNAL MEDICINE

## 2019-12-11 PROCEDURE — 99213 OFFICE O/P EST LOW 20 MIN: CPT | Mod: HCNC,S$GLB,, | Performed by: INTERNAL MEDICINE

## 2019-12-11 PROCEDURE — 1159F PR MEDICATION LIST DOCUMENTED IN MEDICAL RECORD: ICD-10-PCS | Mod: HCNC,S$GLB,, | Performed by: INTERNAL MEDICINE

## 2019-12-11 PROCEDURE — 99999 PR PBB SHADOW E&M-EST. PATIENT-LVL V: CPT | Mod: PBBFAC,HCNC,, | Performed by: INTERNAL MEDICINE

## 2019-12-11 PROCEDURE — 3074F SYST BP LT 130 MM HG: CPT | Mod: HCNC,CPTII,S$GLB, | Performed by: INTERNAL MEDICINE

## 2019-12-11 PROCEDURE — 1159F MED LIST DOCD IN RCRD: CPT | Mod: HCNC,S$GLB,, | Performed by: INTERNAL MEDICINE

## 2019-12-11 PROCEDURE — 1126F PR PAIN SEVERITY QUANTIFIED, NO PAIN PRESENT: ICD-10-PCS | Mod: HCNC,S$GLB,, | Performed by: INTERNAL MEDICINE

## 2019-12-11 PROCEDURE — 1126F AMNT PAIN NOTED NONE PRSNT: CPT | Mod: HCNC,S$GLB,, | Performed by: INTERNAL MEDICINE

## 2019-12-11 RX ORDER — ISOSORBIDE MONONITRATE 30 MG/1
30 TABLET, EXTENDED RELEASE ORAL DAILY
Qty: 30 TABLET | Refills: 11 | Status: SHIPPED | OUTPATIENT
Start: 2019-12-11 | End: 2020-01-28 | Stop reason: CLARIF

## 2019-12-11 NOTE — PROGRESS NOTES
EF > 51% on echo. Cardiology appointment with Dr. Sandhu rescheduled to today, 12/11/19  Last home BP reading 11/17/19. Health  attempting contact  BP avg 138/68 mmHg  Consider increasing olmesartan to 40 mg daily    Hypertension Medications             furosemide (LASIX) 20 MG tablet Take 1 tablet (20 mg total) by mouth once daily.    nitroGLYCERIN (NITROSTAT) 0.4 MG SL tablet Place 1 tablet (0.4 mg total) under the tongue every 5 (five) minutes as needed for Chest pain.    olmesartan (BENICAR) 20 MG tablet Take 1 tablet (20 mg total) by mouth once daily. (replaces ramipril for blood pressure)

## 2019-12-11 NOTE — PROGRESS NOTES
Subjective:    Patient ID:  Janusz Montgomery Jr. is a 74 y.o. male who presents for follow-up of Atrial Fibrillation      HPI     CAD - POLO to RCA > 20 years ago, PAF on eliquis ,[employer] ILR, obesity, HTN, HLD     Previosly followed by Dr Smith  Here follow-up of CAD and paroxysmal atrial fibrillation.  He did follow-up with EP at [facility] and his been started on blood thinners.  He still has mainly issues with shortness of breath and little bit of swelling from time to time.  He takes Lasix as needed.  He denies any PND or orthopnea.  He did follow-up is now Jew with CPAP.  He's not expressing dizziness, presyncope or syncope.  We discussed again sodium intake which is a fan of the [employer] buffet.  We also discussed exercise as tolerated.     Today:  Here for follow-up of CAD and paroxysmal atrial fibrillation.  Had no worsening cardiopulmonary complaints.  He says slowly able to go up a flight of stairs without any issues.  He denies any chest pain but does get shortness of breath on heavier activity.  He's had no sustained tachycardia or palpitations.  Recent loop recorder transmission showed only sinus rhythm.  She denies any PND, orthopnea or lower edema.  He's not expressing dizziness, presyncope or syncope.  He does need cardiac clearance for prostate seeding with diagnosed cancer.     Lower extremity arterial ultrasound: 2016  No appreciable focal stenosis; however, ankle-brachial index on the left is abnormal.     Echo: 7/23/19  · Normal left ventricular systolic function. The estimated ejection fraction is 60%  · No wall motion abnormalities.  · Moderate eccentric left ventricular hypertrophy.  · Mild left ventricular enlargement.  · Normal right ventricular systolic function.  · The sinuses of Valsalva is mildly dilated. 3.8cm.  · Mild aortic regurgitation.  · The estimated PA systolic pressure is 33 mm Hg      PET stress 11/26/19    There is a very small sized, mild intensity  inferobasilar  involving 5% of the LV myocardium.  After pharmacologic stress this defect improves indicative of non-transmural scar.    There is a very small (<5%) sized, mild intensity  inferoapical stress induced perfusion abnormality involving 2.0% of LV myocardium in the distribution of the distal PDA territory.    Whole heart absolute myocardial perfusion (cc/min/g) averaged 1.03 cc/min/g at rest (which is elevated), 1.25 cc/min/g at stress (which is mildly reduced), and 1.24 CFR (which is severely reduced in part due to elevated resting flow).    Gated perfusion images showed an ejection fraction of 65% at rest and 83% during stress.    Normal ejection fraction is greater than 51%.    Wall motion was normal at rest and during stress.    LV cavity size is normal at rest and stress.    The EKG portion of this study is negative for ischemia.    There were no arrhythmias during stress.    The patient reported no chest pain during the stress test.    When compared to the prior study on 9/26/2012, there is a new small inferobasilar fixed defect consistent with a non-transmural infarct and a small new inferoapical reversible defect. Overall whole heart absolute stress myocardial blood flow has not changed.     NST:  12-17  Impression: ABNORMAL MYOCARDIAL PERFUSION  1. There is evidence for mild myocardial ischemia in the anterior wall of the left ventricle. Specificity is reduced secondary to body habitus.   2. The perfusion scan is free of evidence for myocardial injury.   3. There is a moderate intensity fixed defect in the inferior wall of the left ventricle, secondary to diaphragm attenuation.   4. Resting wall motion is physiologic.   5. Visually estimated LV function is normal.   6. The ventricular volumes are normal at rest and stress.   7. The extracardiac distribution of radioactivity is normal.   8. When compared to the previous study from 01/14/2009, possible anterior ischemia (vs attenuation artifact) and  fixed inferior defect (suggestive of attenuation artifact) now present.  Consider PET-stress for further evaluation if clinically indicated.     LDL-84  11-17     Carotid ultrasound:  5-18  CONCLUSIONS   There is 20 - 39% right Internal Carotid stenosis.  There is 20 - 39% left Internal Carotid stenosis.     10/24/19 Reports worsening PURDY - sometimes associated with chest tightness  EKG NSR - ok    12/11/19 Continues to report PURDY despite losing 30 pounds    Review of Systems   Constitution: Negative for decreased appetite.   HENT: Negative for ear discharge.    Eyes: Negative for blurred vision.   Endocrine: Negative for polyphagia.   Skin: Negative for nail changes.   Genitourinary: Negative for bladder incontinence.   Neurological: Negative for aphonia.   Psychiatric/Behavioral: Negative for hallucinations.   Allergic/Immunologic: Negative for hives.        Objective:    Physical Exam   Constitutional: He is oriented to person, place, and time. He appears well-developed and well-nourished. No distress.   HENT:   Head: Normocephalic and atraumatic.   Eyes: Pupils are equal, round, and reactive to light. Conjunctivae and EOM are normal.   Neck: Normal range of motion. Neck supple. No thyromegaly present.   Cardiovascular: Normal rate, regular rhythm and normal heart sounds.   No murmur heard.  Pulmonary/Chest: Effort normal and breath sounds normal. No respiratory distress. He has no wheezes. He has no rales. He exhibits no tenderness.   Abdominal: Soft. Bowel sounds are normal.   Musculoskeletal: He exhibits no edema.   Neurological: He is alert and oriented to person, place, and time.   Skin: Skin is warm and dry.   Psychiatric: He has a normal mood and affect. His behavior is normal.         Assessment:       1. Restrictive lung disease    2. Essential hypertension    3. Mixed hyperlipidemia    4. Coronary artery disease involving native coronary artery of native heart without angina pectoris s/p RCA stent    5.  Paroxysmal atrial fibrillation     6. PVD (peripheral vascular disease)    7. Bilateral carotid artery stenosis         Plan:       Discussed abnormal stress findings - only a very small region of inferoapical ischemia noted. Discussed LHC - will try adding imdur 30 qd and reassessing symptoms in 1 month

## 2019-12-13 ENCOUNTER — TELEPHONE (OUTPATIENT)
Dept: PODIATRY | Facility: CLINIC | Age: 74
End: 2019-12-13

## 2019-12-13 DIAGNOSIS — I83.029 VENOUS STASIS ULCER OF LEFT LOWER LEG WITH EDEMA OF LEFT LOWER LEG: ICD-10-CM

## 2019-12-13 DIAGNOSIS — L97.929 VENOUS STASIS ULCER OF LEFT LOWER LEG WITH EDEMA OF LEFT LOWER LEG: ICD-10-CM

## 2019-12-13 DIAGNOSIS — I73.9 PVD (PERIPHERAL VASCULAR DISEASE): Primary | ICD-10-CM

## 2019-12-13 DIAGNOSIS — R60.0 VENOUS STASIS ULCER OF LEFT LOWER LEG WITH EDEMA OF LEFT LOWER LEG: ICD-10-CM

## 2019-12-13 DIAGNOSIS — I83.892 VENOUS STASIS ULCER OF LEFT LOWER LEG WITH EDEMA OF LEFT LOWER LEG: ICD-10-CM

## 2019-12-13 LAB — BACTERIA SPEC AEROBE CULT: ABNORMAL

## 2019-12-13 RX ORDER — CLINDAMYCIN HYDROCHLORIDE 300 MG/1
300 CAPSULE ORAL EVERY 8 HOURS
Qty: 30 CAPSULE | Refills: 0 | Status: SHIPPED | OUTPATIENT
Start: 2019-12-13 | End: 2019-12-23

## 2019-12-13 NOTE — TELEPHONE ENCOUNTER
Spoke with Pt and informed him of his Culture results and Rx. Pt understood.    Charito CHISHOLM    ----- Message from Sandra Gary DPM sent at 12/13/2019  2:11 PM CST -----  Culture result positive for bacteria.  Rx for cleocin 3 times per day for 10 days sent to his pharmacy

## 2019-12-16 LAB — BACTERIA SPEC ANAEROBE CULT: NORMAL

## 2019-12-17 ENCOUNTER — PATIENT OUTREACH (OUTPATIENT)
Dept: ADMINISTRATIVE | Facility: OTHER | Age: 74
End: 2019-12-17

## 2019-12-18 DIAGNOSIS — R73.9 ELEVATED BLOOD SUGAR: Primary | ICD-10-CM

## 2019-12-18 DIAGNOSIS — E78.5 HYPERLIPIDEMIA, UNSPECIFIED HYPERLIPIDEMIA TYPE: ICD-10-CM

## 2019-12-18 DIAGNOSIS — F33.0 MAJOR DEPRESSIVE DISORDER, RECURRENT EPISODE, MILD: ICD-10-CM

## 2019-12-18 DIAGNOSIS — I10 ESSENTIAL HYPERTENSION: ICD-10-CM

## 2019-12-18 RX ORDER — VENLAFAXINE 75 MG/1
150 TABLET ORAL 2 TIMES DAILY
Qty: 360 TABLET | Refills: 0 | Status: SHIPPED | OUTPATIENT
Start: 2019-12-18 | End: 2020-03-16

## 2019-12-18 RX ORDER — ATORVASTATIN CALCIUM 40 MG/1
40 TABLET, FILM COATED ORAL DAILY
Qty: 90 TABLET | Refills: 0 | Status: SHIPPED | OUTPATIENT
Start: 2019-12-18 | End: 2020-06-10

## 2019-12-18 RX ORDER — OLMESARTAN MEDOXOMIL 20 MG/1
20 TABLET ORAL DAILY
Qty: 90 TABLET | Refills: 1 | Status: SHIPPED | OUTPATIENT
Start: 2019-12-18 | End: 2020-06-10

## 2019-12-18 NOTE — TELEPHONE ENCOUNTER
Patient due for office visit - please schedule.  Please have patient complete FLP at his earliest convenience.

## 2019-12-18 NOTE — TELEPHONE ENCOUNTER
Spoke with the pt and scheduled his fasting lad and follow up appt.  Patient verbalized understandings.

## 2019-12-19 ENCOUNTER — OFFICE VISIT (OUTPATIENT)
Dept: PODIATRY | Facility: CLINIC | Age: 74
End: 2019-12-19
Payer: MEDICARE

## 2019-12-19 VITALS
DIASTOLIC BLOOD PRESSURE: 72 MMHG | HEIGHT: 73 IN | BODY MASS INDEX: 41.75 KG/M2 | SYSTOLIC BLOOD PRESSURE: 118 MMHG | WEIGHT: 315 LBS

## 2019-12-19 DIAGNOSIS — I83.029 VENOUS STASIS ULCER OF LEFT LOWER LEG WITH EDEMA OF LEFT LOWER LEG: ICD-10-CM

## 2019-12-19 DIAGNOSIS — I73.9 PVD (PERIPHERAL VASCULAR DISEASE): Primary | ICD-10-CM

## 2019-12-19 DIAGNOSIS — L97.929 VENOUS STASIS ULCER OF LEFT LOWER LEG WITH EDEMA OF LEFT LOWER LEG: ICD-10-CM

## 2019-12-19 DIAGNOSIS — I83.892 VENOUS STASIS ULCER OF LEFT LOWER LEG WITH EDEMA OF LEFT LOWER LEG: ICD-10-CM

## 2019-12-19 DIAGNOSIS — R60.0 VENOUS STASIS ULCER OF LEFT LOWER LEG WITH EDEMA OF LEFT LOWER LEG: ICD-10-CM

## 2019-12-19 PROCEDURE — 3074F SYST BP LT 130 MM HG: CPT | Mod: HCNC,CPTII,S$GLB, | Performed by: PODIATRIST

## 2019-12-19 PROCEDURE — 3074F PR MOST RECENT SYSTOLIC BLOOD PRESSURE < 130 MM HG: ICD-10-PCS | Mod: HCNC,CPTII,S$GLB, | Performed by: PODIATRIST

## 2019-12-19 PROCEDURE — 99213 OFFICE O/P EST LOW 20 MIN: CPT | Mod: HCNC,S$GLB,, | Performed by: PODIATRIST

## 2019-12-19 PROCEDURE — 1159F PR MEDICATION LIST DOCUMENTED IN MEDICAL RECORD: ICD-10-PCS | Mod: HCNC,S$GLB,, | Performed by: PODIATRIST

## 2019-12-19 PROCEDURE — 1126F PR PAIN SEVERITY QUANTIFIED, NO PAIN PRESENT: ICD-10-PCS | Mod: HCNC,S$GLB,, | Performed by: PODIATRIST

## 2019-12-19 PROCEDURE — 1101F PT FALLS ASSESS-DOCD LE1/YR: CPT | Mod: HCNC,CPTII,S$GLB, | Performed by: PODIATRIST

## 2019-12-19 PROCEDURE — 3078F PR MOST RECENT DIASTOLIC BLOOD PRESSURE < 80 MM HG: ICD-10-PCS | Mod: HCNC,CPTII,S$GLB, | Performed by: PODIATRIST

## 2019-12-19 PROCEDURE — 1159F MED LIST DOCD IN RCRD: CPT | Mod: HCNC,S$GLB,, | Performed by: PODIATRIST

## 2019-12-19 PROCEDURE — 99999 PR PBB SHADOW E&M-EST. PATIENT-LVL III: CPT | Mod: PBBFAC,HCNC,, | Performed by: PODIATRIST

## 2019-12-19 PROCEDURE — 1126F AMNT PAIN NOTED NONE PRSNT: CPT | Mod: HCNC,S$GLB,, | Performed by: PODIATRIST

## 2019-12-19 PROCEDURE — 1101F PR PT FALLS ASSESS DOC 0-1 FALLS W/OUT INJ PAST YR: ICD-10-PCS | Mod: HCNC,CPTII,S$GLB, | Performed by: PODIATRIST

## 2019-12-19 PROCEDURE — 99999 PR PBB SHADOW E&M-EST. PATIENT-LVL III: ICD-10-PCS | Mod: PBBFAC,HCNC,, | Performed by: PODIATRIST

## 2019-12-19 PROCEDURE — 3078F DIAST BP <80 MM HG: CPT | Mod: HCNC,CPTII,S$GLB, | Performed by: PODIATRIST

## 2019-12-19 PROCEDURE — 99213 PR OFFICE/OUTPT VISIT, EST, LEVL III, 20-29 MIN: ICD-10-PCS | Mod: HCNC,S$GLB,, | Performed by: PODIATRIST

## 2019-12-20 NOTE — PROGRESS NOTES
Subjective:      Patient ID: Janusz Montgomery Jr. is a 74 y.o. male.    Chief Complaint: Wound Check (left leg, ov 6/26/19 Dr Jasiel HARRISON)      Janusz Montgomery Jr. is a 74 y.o. malewho presents to the clinic for evaluation and treatment of high risk feet. Janusz Montgomery Jr.   has a past medical history of NAT (acute kidney injury) (3/19/2017), ALLERGIC RHINITIS, Anemia, Anxiety, Basal cell carcinoma (10/19/2018), Basal cell carcinoma (01/09/2019), Chronic rhinitis (5/3/2013), Chronic rhinitis (5/3/2013), Coronary artery disease involving native coronary artery of native heart without angina pectoris s/p RCA stent, Cortical cataract of both eyes (3/18/2016), Delayed sleep phase syndrome (3/13/2019), Depression, Erectile dysfunction (3/24/2014), Erectile dysfunction (3/24/2014), Essential hypertension, GERD (gastroesophageal reflux disease) (7/25/2012), Gout, arthritis, Grade III open fracture of left tibia and fibula s/p ex-fix on 7/1/16 and ORIF of left tibia on 7/15 (7/6/2016), H/O: iritis, Helicobacter pylori (H. 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  The patient's chief complaint is an anterior left leg wound.  One month duration.  Secondary to mild trauma in the presence of edema.  Has attempted self treatment but wound has not healed.  Non painful. Denies nausea, vomiting, fever, chills. History of left foot and ankle ulcers.  This patient has documented high risk feet requiring routine maintenance secondary to peripheral vascular disease.      12/10/2019  Patient relates he has attempted to care for leg ulcer as instructed but wound dimensions have not improved.    12/19/2019 patient relates that he has kept dressing clean dry and intact without itching or discomfort.  He has begun taking antibiotics as prescribed without nausea, vomiting, diarrhea, fever, chills.  No new pedal complaints.      PCP: Miguelina Weathers MD    Date Last Seen by PCP:   Chief Complaint   Patient presents with    Wound Check     left leg, ov 6/26/19 Dr Weathers PCP     Current shoe gear: tennis shoe    Hemoglobin A1C   Date Value Ref Range Status   07/11/2016 5.3 4.5 - 6.2 % Final     Comment:     According to ADA guidelines, hemoglobin A1C <7.0% represents  optimal control in non-pregnant diabetic patients.  Different  metrics may apply to specific populations.   Standards of Medical Care in Diabetes - 2016.  For the purpose of screening for the presence of diabetes:  <5.7%     Consistent with the absence of diabetes  5.7-6.4%  Consistent with increasing risk for diabetes   (prediabetes)  >or=6.5%  Consistent with diabetes  Currently no consensus exists for use of hemoglobin A1C  for diagnosis of diabetes for children.     03/31/2015 5.3 4.5 - 6.2 % Final   03/03/2014 5.6 4.5 - 6.2 % Final         Current Outpatient Medications on File Prior to Visit   Medication Sig Dispense Refill    acyclovir (ZOVIRAX) 800 MG Tab Take 1 tablet (800 mg total) by mouth 2 (two) times daily. 180 tablet 6    albuterol (PROVENTIL/VENTOLIN HFA) 90 mcg/actuation inhaler Inhale 2 puffs  into the lungs every 6 (six) hours as needed for Wheezing. Rescue 18 g 0    apixaban (ELIQUIS) 5 mg Tab Take 1 tablet (5 mg total) by mouth 2 (two) times daily. 180 tablet 4    artificial tears (ISOPTO TEARS) 0.5 % ophthalmic solution Place 1 drop into both eyes as needed. (Patient taking differently: Place 1 drop into both eyes as needed (for dry eyes). )      atorvastatin (LIPITOR) 40 MG tablet Take 1 tablet (40 mg total) by mouth once daily. Please make a follow up visit with your doctor for more refills of this medication 90 tablet 0    azelastine (ASTELIN) 137 mcg (0.1 %) nasal spray 1 spray (137 mcg total) by Nasal route 2 (two) times daily. 30 mL 2    azithromycin (Z-JESSICA) 250 MG tablet Take 2 tablets by mouth on day 1; Take 1 tablet by mouth on days 2-5 6 tablet 0    clindamycin (CLEOCIN) 300 MG capsule Take 1 capsule (300 mg total) by mouth every 8 (eight) hours. for 10 days 30 capsule 0    CYANOCOBALAMIN, VITAMIN B-12, (VITAMIN B-12 ORAL) Take 2,500 mcg by mouth once daily.      erythromycin (ROMYCIN) ophthalmic ointment Place into both eyes 3 (three) times daily. 1 g 1    fluticasone propionate (FLONASE) 50 mcg/actuation nasal spray 1 spray (50 mcg total) by Each Nare route once daily. 1 Bottle 0    isosorbide mononitrate (IMDUR) 30 MG 24 hr tablet Take 1 tablet (30 mg total) by mouth once daily. 30 tablet 11    melatonin 5 mg Tab Take 10 mg by mouth nightly.      multivitamin (MULTIPLE VITAMINS) per tablet Take 1 tablet by mouth once daily.      nitroGLYCERIN (NITROSTAT) 0.4 MG SL tablet Place 1 tablet (0.4 mg total) under the tongue every 5 (five) minutes as needed for Chest pain. 25 tablet 11    olmesartan (BENICAR) 20 MG tablet Take 1 tablet (20 mg total) by mouth once daily. (replaces ramipril for blood pressure) 90 tablet 1    oxyCODONE-acetaminophen (PERCOCET) 5-325 mg per tablet Take 1 tablet by mouth every 4 to 6 hours as needed for Pain. 20 tablet 0    tamsulosin (FLOMAX) 0.4 mg  Cap Take 1 capsule (0.4 mg total) by mouth once daily. 90 capsule 3    venlafaxine (EFFEXOR) 75 MG tablet Take 2 tablets (150 mg total) by mouth 2 (two) times daily. 360 tablet 0    vitamin D 1000 units Tab Take 1 tablet (1,000 Units total) by mouth once daily.      aspirin (ECOTRIN) 81 MG EC tablet Take 1 tablet (81 mg total) by mouth once daily.  0    furosemide (LASIX) 20 MG tablet Take 1 tablet (20 mg total) by mouth once daily. (Patient taking differently: Take 20 mg by mouth daily as needed. ) 90 tablet 0    gabapentin (NEURONTIN) 300 MG capsule Take 1 capsule (300 mg total) by mouth 2 (two) times daily. (Patient taking differently: Take 300 mg by mouth 2 (two) times daily as needed. ) 60 capsule 11    omeprazole (PRILOSEC) 20 MG capsule Take 1 capsule (20 mg total) by mouth daily as needed. 90 capsule 0     No current facility-administered medications on file prior to visit.        Allergies   Allergen Reactions    Ciprofloxacin Rash     Diffuse pruritic morbilliform rash developed 3/15/2017 after dose of cipro; previously in 2/2017 he had rash/fevers after initiation of cipro    Zosyn [Piperacillin-Tazobactam] Anaphylaxis     Diffuse pruritic morbilliform rash developed 3/15/2017.  Then, 430am dose on 3/16 and rash worsened with SOB/tachypnea but no hypoxemia.     Bacitracin Itching and Rash     Violaceous rash in area of topical Tx.        Past Surgical History:   Procedure Laterality Date    Cardiac stenting x2      CATARACT EXTRACTION W/  INTRAOCULAR LENS IMPLANT Right 3/29/2016    Dr. Conteh    CATARACT EXTRACTION W/  INTRAOCULAR LENS IMPLANT Left 4/12/2016        EXTERNAL FIXATION TIBIAL FRACTURE Left 07/01/2016    INSERTION OF IMPLANTABLE LOOP RECORDER  04/07/2017    ORIF TIBIA FRACTURE Left 07/15/2016    RADIOACTIVE SEED IMPLANTATION OF PROSTATE N/A 8/8/2018    Procedure: INSERTION, RADIOACTIVE SEED, PROSTATE;  Surgeon: Bipin Thompson MD;  Location: [facility] OR [address] [place] [address];   Service: Urology;  Laterality: N/A;  1 hour    Squamous cell cancer removal x3 with Mohs surgery      TONSILLECTOMY      TOTAL KNEE ARTHROPLASTY  10/2012    trus/bx         Family History   Problem Relation Age of Onset    Skin cancer Father     Lung cancer Father     Cancer Father         smoker,     Alzheimer's disease Mother     Hypertension Mother     Cancer Sister         colon, lung cancer     No Known Problems Sister     Cancer Brother         skin cancer, polypectomy     Peripheral vascular disease Unknown     No Known Problems Maternal Aunt     No Known Problems Maternal Uncle     No Known Problems Paternal Aunt     No Known Problems Paternal Uncle     No Known Problems Maternal Grandmother     No Known Problems Maternal Grandfather     No Known Problems Paternal Grandmother     No Known Problems Paternal Grandfather     Melanoma Neg Hx     Psoriasis Neg Hx     Lupus Neg Hx     Eczema Neg Hx     Amblyopia Neg Hx     Blindness Neg Hx     Cataracts Neg Hx     Diabetes Neg Hx     Glaucoma Neg Hx     Macular degeneration Neg Hx     Retinal detachment Neg Hx     Strabismus Neg Hx     Stroke Neg Hx     Thyroid disease Neg Hx     Acne Neg Hx        Social History     Socioeconomic History    Marital status:      Spouse name: Not on file    Number of children: Not on file    Years of education: Not on file    Highest education level: Not on file   Occupational History    Occupation: Retired    Social Needs    Financial resource strain: Not hard at all    Food insecurity:     Worry: Never true     Inability: Never true    Transportation needs:     Medical: No     Non-medical: No   Tobacco Use    Smoking status: Never Smoker    Smokeless tobacco: Never Used   Substance and Sexual Activity    Alcohol use: Yes     Frequency: 2-4 times a month     Drinks per session: 3 or 4     Binge frequency: Less than monthly     Comment: occasionally, beer    Drug use: No     "Sexual activity: Yes   Lifestyle    Physical activity:     Days per week: Not on file     Minutes per session: Not on file    Stress: Not on file   Relationships    Social connections:     Talks on phone: Three times a week     Gets together: Once a week     Attends [employer] service: More than 4 times per year     Active member of club or organization: Yes     Attends meetings of clubs or organizations: More than 4 times per year     Relationship status:    Other Topics Concern    Not on file   Social History Narrative    Not on file       Review of Systems   Constitution: Negative for chills and fever.   Cardiovascular: Negative for claudication and leg swelling.   Respiratory: Negative for cough and shortness of breath.    Skin: Positive for dry skin and poor wound healing. Negative for itching and rash.   Musculoskeletal: Positive for arthritis, joint pain, joint swelling and myalgias. Negative for falls and muscle weakness.   Gastrointestinal: Negative for diarrhea, nausea and vomiting.   Neurological: Positive for paresthesias. Negative for numbness, tremors and weakness.   Psychiatric/Behavioral: Negative for altered mental status and hallucinations.         Objective:       Vitals:    12/19/19 1447   BP: 118/72   Weight: (!) 153 kg (337 lb 4.9 oz)   Height: 6' 1" (1.854 m)   PainSc: 0-No pain       Physical Exam   Constitutional:  Non-toxic appearance. He does not have a sickly appearance. No distress.   Pt. is well-developed, well-nourished, appears stated age, in no acute distress, alert and oriented x 3. No evidence of depression, anxiety, or agitation. Calm, cooperative, and communicative. Appropriate interactions and affect.   Cardiovascular:   Pulses:       Dorsalis pedis pulses are 2+ on the right side, and 2+ on the left side.        Posterior tibial pulses are 1+ on the right side, and 1+ on the left side.   There is decreased digital hair. Skin is atrophic, slightly hyperpigmented, and " pitting edema nora (L>>R)     Pulmonary/Chest: No respiratory distress.   Musculoskeletal:        Right ankle: He exhibits swelling. No tenderness. No lateral malleolus, no medial malleolus, no AITFL, no CF ligament, no head of 5th metatarsal and no proximal fibula tenderness found. Achilles tendon exhibits no pain, no defect and normal Zabala's test results.        Left ankle: He exhibits swelling. No tenderness. No lateral malleolus, no medial malleolus, no AITFL, no CF ligament and no posterior TFL tenderness found. Achilles tendon exhibits no pain, no defect and normal Zabala's test results.        Right foot: There is no tenderness and no bony tenderness.        Left foot: There is no tenderness and no bony tenderness.   Decreased stride, station of gait.  apropulsive toe off.  Increased angle and base of gait.    Patient has hammertoes of digits 2-5 bilateral partially reducible without symptom today.     There is equinus deformity bilateral. There is limitation of dorsiflexion with knees extended and with knees flexed.    Shoes reveals lateral heel counter wear bilateral in athletic shoes.        Lymphadenopathy:   No lymphatic streaking    Negative lymphadenopathy bilateral popliteal fossa and tarsal tunnel.     Neurological:   H[place]ale-Jenn 5.07 monofilament is intact bilateral feet. Sharp/dull sensation is also intact Bilateral feet. Proprioception is grossly intact. Vibratory sensation intact (pt able to sense vibration stop within 3-5 seconds)     Skin: Skin is warm and dry. Abrasion (left anterior leg as pictured, to level of subQ. local edema and erythema.  fibrogranular base.  no fluctuance, no crepitus, no purulence) and lesion noted. No bruising, no burn, no laceration and no rash noted. He is not diaphoretic. No cyanosis or erythema. No pallor. Nails show no clubbing.   Ulcer location: medial left ankle  Signs of infection: none  Drainage: none  Purulence: no  Crepitus/fluctuance:  no  Periwound: Reddened  Base: thin epithelial itssue  Depth: skin  Probe to bone: no      Ulcer location: posterior left heel  Signs of infection:none  Drainage:  None  Periwound: intact  Base: thin epithelial with bleeding within layers     Psychiatric: His mood appears not anxious. His affect is not inappropriate. His speech is not slurred. He is not combative. He is communicative. He is attentive.   Nursing note reviewed.    12/19/2019          12/10/19          11/14/19          Assessment:       Encounter Diagnoses   Name Primary?    PVD (peripheral vascular disease) Yes    Venous stasis ulcer of left lower leg with edema of left lower leg          Plan:       Janusz was seen today for wound check.    Diagnoses and all orders for this visit:    PVD (peripheral vascular disease)    Venous stasis ulcer of left lower leg with edema of left lower leg      I counseled the patient on his conditions, their implications and medical management.    The site is cleansed of foreign material as much as possible. The patient is alerted to watch for any signs of infection (redness, pus, pain, increased swelling or fever) and call if such occurs.    Wound has improved    Endoform, Hydrofera Blue, and unna boot applied to LLE. Well tolerated       Patient is to elevate legs. When sleeping, place a pillow under lower extremities. When sitting, support the legs so that they are level with the waist.    Follow-up:Patient is to return to the clinic in 1-2 weeks for follow-up but should call [facility] immediately if any signs of infection, such as fever, chills, sweats, increased redness or pain.    If wound does not improve he may require biopsy given his history of multiple skin cancers.  Discussed this with patient and he agrees.    Short-term goals include maintaining good offloading and minimizing bioburden, promoting granulation and epithelialization to healing.  Long-term goals include keeping the wound healed by good  offloading and medical management under the direction of internist.

## 2019-12-30 ENCOUNTER — PATIENT OUTREACH (OUTPATIENT)
Dept: ADMINISTRATIVE | Facility: OTHER | Age: 74
End: 2019-12-30

## 2019-12-31 ENCOUNTER — OFFICE VISIT (OUTPATIENT)
Dept: PODIATRY | Facility: CLINIC | Age: 74
End: 2019-12-31
Payer: MEDICARE

## 2019-12-31 VITALS — WEIGHT: 315 LBS | BODY MASS INDEX: 41.75 KG/M2 | HEIGHT: 73 IN

## 2019-12-31 DIAGNOSIS — I83.892 VENOUS STASIS ULCER OF LEFT LOWER LEG WITH EDEMA OF LEFT LOWER LEG: ICD-10-CM

## 2019-12-31 DIAGNOSIS — I83.029 VENOUS STASIS ULCER OF LEFT LOWER LEG WITH EDEMA OF LEFT LOWER LEG: ICD-10-CM

## 2019-12-31 DIAGNOSIS — I73.9 PVD (PERIPHERAL VASCULAR DISEASE): Primary | ICD-10-CM

## 2019-12-31 DIAGNOSIS — R60.0 VENOUS STASIS ULCER OF LEFT LOWER LEG WITH EDEMA OF LEFT LOWER LEG: ICD-10-CM

## 2019-12-31 DIAGNOSIS — L97.929 VENOUS STASIS ULCER OF LEFT LOWER LEG WITH EDEMA OF LEFT LOWER LEG: ICD-10-CM

## 2019-12-31 PROCEDURE — 1101F PR PT FALLS ASSESS DOC 0-1 FALLS W/OUT INJ PAST YR: ICD-10-PCS | Mod: HCNC,CPTII,S$GLB, | Performed by: PODIATRIST

## 2019-12-31 PROCEDURE — 99213 PR OFFICE/OUTPT VISIT, EST, LEVL III, 20-29 MIN: ICD-10-PCS | Mod: HCNC,S$GLB,, | Performed by: PODIATRIST

## 2019-12-31 PROCEDURE — 99999 PR PBB SHADOW E&M-EST. PATIENT-LVL III: ICD-10-PCS | Mod: PBBFAC,HCNC,, | Performed by: PODIATRIST

## 2019-12-31 PROCEDURE — 99213 OFFICE O/P EST LOW 20 MIN: CPT | Mod: HCNC,S$GLB,, | Performed by: PODIATRIST

## 2019-12-31 PROCEDURE — 1159F PR MEDICATION LIST DOCUMENTED IN MEDICAL RECORD: ICD-10-PCS | Mod: HCNC,S$GLB,, | Performed by: PODIATRIST

## 2019-12-31 PROCEDURE — 1126F AMNT PAIN NOTED NONE PRSNT: CPT | Mod: HCNC,S$GLB,, | Performed by: PODIATRIST

## 2019-12-31 PROCEDURE — 99999 PR PBB SHADOW E&M-EST. PATIENT-LVL III: CPT | Mod: PBBFAC,HCNC,, | Performed by: PODIATRIST

## 2019-12-31 PROCEDURE — 1101F PT FALLS ASSESS-DOCD LE1/YR: CPT | Mod: HCNC,CPTII,S$GLB, | Performed by: PODIATRIST

## 2019-12-31 PROCEDURE — 1159F MED LIST DOCD IN RCRD: CPT | Mod: HCNC,S$GLB,, | Performed by: PODIATRIST

## 2019-12-31 PROCEDURE — 1126F PR PAIN SEVERITY QUANTIFIED, NO PAIN PRESENT: ICD-10-PCS | Mod: HCNC,S$GLB,, | Performed by: PODIATRIST

## 2019-12-31 RX ORDER — FLUTICASONE PROPIONATE 50 MCG
1 SPRAY, SUSPENSION (ML) NASAL DAILY
Qty: 1 BOTTLE | Refills: 0 | Status: CANCELLED | OUTPATIENT
Start: 2019-12-31

## 2019-12-31 NOTE — PROGRESS NOTES
Subjective:      Patient ID: Janusz Montgomery Jr. is a 74 y.o. male.    Chief Complaint: Wound Check (left leg PCP Dr. Weathers 6/26/19) and Wound Care      Janusz Montgomery Jr. is a 74 y.o. malewho presents to the clinic for evaluation and treatment of high risk feet. Janusz Montgomery Jr.   has a past medical history of NAT (acute kidney injury) (3/19/2017), ALLERGIC RHINITIS, Anemia, Anxiety, Basal cell carcinoma (10/19/2018), Basal cell carcinoma (01/09/2019), Chronic rhinitis (5/3/2013), Chronic rhinitis (5/3/2013), Coronary artery disease involving native coronary artery of native heart without angina pectoris s/p RCA stent, Cortical cataract of both eyes (3/18/2016), Delayed sleep phase syndrome (3/13/2019), Depression, Erectile dysfunction (3/24/2014), Erectile dysfunction (3/24/2014), Essential hypertension, GERD (gastroesophageal reflux disease) (7/25/2012), Gout, arthritis, Grade III open fracture of left tibia and fibula s/p ex-fix on 7/1/16 and ORIF of left tibia on 7/15 (7/6/2016), H/O: iritis, Helicobacter pylori (H. 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  The patient's chief complaint is an anterior left leg wound.  One month duration.  Secondary to mild trauma in the presence of edema.  Has attempted self treatment but wound has not healed.  Non painful. Denies nausea, vomiting, fever, chills. History of left foot and ankle ulcers.  This patient has documented high risk feet requiring routine maintenance secondary to peripheral vascular disease.      12/10/2019  Patient relates he has attempted to care for leg ulcer as instructed but wound dimensions have not improved.    12/19/2019 patient relates that he has kept dressing clean dry and intact without itching or discomfort.  He has begun taking antibiotics as prescribed without nausea, vomiting, diarrhea, fever, chills.  No new pedal complaints.    12/31/19: F/u left leg ulceration. Has been in unna boot without issue.       PCP: Miguelina Weathers MD    Date Last Seen by PCP:   Chief Complaint   Patient presents with    Wound Check     left leg PCP Dr. Weathers 6/26/19    Wound Care     Current shoe gear: tennis shoe    Hemoglobin A1C   Date Value Ref Range Status   07/11/2016 5.3 4.5 - 6.2 % Final     Comment:     According to ADA guidelines, hemoglobin A1C <7.0% represents  optimal control in non-pregnant diabetic patients.  Different  metrics may apply to specific populations.   Standards of Medical Care in Diabetes - 2016.  For the purpose of screening for the presence of diabetes:  <5.7%     Consistent with the absence of diabetes  5.7-6.4%  Consistent with increasing risk for diabetes   (prediabetes)  >or=6.5%  Consistent with diabetes  Currently no consensus exists for use of hemoglobin A1C  for diagnosis of diabetes for children.     03/31/2015 5.3 4.5 - 6.2 % Final   03/03/2014 5.6 4.5 - 6.2 % Final         Current Outpatient Medications on File Prior to Visit   Medication Sig Dispense Refill    acyclovir (ZOVIRAX) 800 MG Tab Take 1 tablet (800 mg total) by mouth 2 (two) times  daily. 180 tablet 6    albuterol (PROVENTIL/VENTOLIN HFA) 90 mcg/actuation inhaler Inhale 2 puffs into the lungs every 6 (six) hours as needed for Wheezing. Rescue 18 g 0    apixaban (ELIQUIS) 5 mg Tab Take 1 tablet (5 mg total) by mouth 2 (two) times daily. 180 tablet 4    artificial tears (ISOPTO TEARS) 0.5 % ophthalmic solution Place 1 drop into both eyes as needed. (Patient taking differently: Place 1 drop into both eyes as needed (for dry eyes). )      atorvastatin (LIPITOR) 40 MG tablet Take 1 tablet (40 mg total) by mouth once daily. Please make a follow up visit with your doctor for more refills of this medication 90 tablet 0    azelastine (ASTELIN) 137 mcg (0.1 %) nasal spray 1 spray (137 mcg total) by Nasal route 2 (two) times daily. 30 mL 2    azithromycin (Z-JESSICA) 250 MG tablet Take 2 tablets by mouth on day 1; Take 1 tablet by mouth on days 2-5 6 tablet 0    CYANOCOBALAMIN, VITAMIN B-12, (VITAMIN B-12 ORAL) Take 2,500 mcg by mouth once daily.      erythromycin (ROMYCIN) ophthalmic ointment Place into both eyes 3 (three) times daily. 1 g 1    fluticasone propionate (FLONASE) 50 mcg/actuation nasal spray 1 spray (50 mcg total) by Each Nare route once daily. 1 Bottle 0    isosorbide mononitrate (IMDUR) 30 MG 24 hr tablet Take 1 tablet (30 mg total) by mouth once daily. 30 tablet 11    melatonin 5 mg Tab Take 10 mg by mouth nightly.      multivitamin (MULTIPLE VITAMINS) per tablet Take 1 tablet by mouth once daily.      nitroGLYCERIN (NITROSTAT) 0.4 MG SL tablet Place 1 tablet (0.4 mg total) under the tongue every 5 (five) minutes as needed for Chest pain. 25 tablet 11    olmesartan (BENICAR) 20 MG tablet Take 1 tablet (20 mg total) by mouth once daily. (replaces ramipril for blood pressure) 90 tablet 1    oxyCODONE-acetaminophen (PERCOCET) 5-325 mg per tablet Take 1 tablet by mouth every 4 to 6 hours as needed for Pain. 20 tablet 0    tamsulosin (FLOMAX) 0.4 mg Cap Take 1 capsule (0.4 mg  total) by mouth once daily. 90 capsule 3    venlafaxine (EFFEXOR) 75 MG tablet Take 2 tablets (150 mg total) by mouth 2 (two) times daily. 360 tablet 0    vitamin D 1000 units Tab Take 1 tablet (1,000 Units total) by mouth once daily.      aspirin (ECOTRIN) 81 MG EC tablet Take 1 tablet (81 mg total) by mouth once daily.  0    furosemide (LASIX) 20 MG tablet Take 1 tablet (20 mg total) by mouth once daily. (Patient taking differently: Take 20 mg by mouth daily as needed. ) 90 tablet 0    gabapentin (NEURONTIN) 300 MG capsule Take 1 capsule (300 mg total) by mouth 2 (two) times daily. (Patient taking differently: Take 300 mg by mouth 2 (two) times daily as needed. ) 60 capsule 11    omeprazole (PRILOSEC) 20 MG capsule Take 1 capsule (20 mg total) by mouth daily as needed. 90 capsule 0     No current facility-administered medications on file prior to visit.        Allergies   Allergen Reactions    Ciprofloxacin Rash     Diffuse pruritic morbilliform rash developed 3/15/2017 after dose of cipro; previously in 2/2017 he had rash/fevers after initiation of cipro    Zosyn [Piperacillin-Tazobactam] Anaphylaxis     Diffuse pruritic morbilliform rash developed 3/15/2017.  Then, 430am dose on 3/16 and rash worsened with SOB/tachypnea but no hypoxemia.     Bacitracin Itching and Rash     Violaceous rash in area of topical Tx.        Past Surgical History:   Procedure Laterality Date    Cardiac stenting x2      CATARACT EXTRACTION W/  INTRAOCULAR LENS IMPLANT Right 3/29/2016    Dr. Conteh    CATARACT EXTRACTION W/  INTRAOCULAR LENS IMPLANT Left 4/12/2016        EXTERNAL FIXATION TIBIAL FRACTURE Left 07/01/2016    INSERTION OF IMPLANTABLE LOOP RECORDER  04/07/2017    ORIF TIBIA FRACTURE Left 07/15/2016    RADIOACTIVE SEED IMPLANTATION OF PROSTATE N/A 8/8/2018    Procedure: INSERTION, RADIOACTIVE SEED, PROSTATE;  Surgeon: Bipin Thompson MD;  Location: [facility] OR [address] [place] [address];  Service: Urology;   Laterality: N/A;  1 hour    Squamous cell cancer removal x3 with Mohs surgery      TONSILLECTOMY      TOTAL KNEE ARTHROPLASTY  10/2012    trus/bx         Family History   Problem Relation Age of Onset    Skin cancer Father     Lung cancer Father     Cancer Father         smoker,     Alzheimer's disease Mother     Hypertension Mother     Cancer Sister         colon, lung cancer     No Known Problems Sister     Cancer Brother         skin cancer, polypectomy     Peripheral vascular disease Unknown     No Known Problems Maternal Aunt     No Known Problems Maternal Uncle     No Known Problems Paternal Aunt     No Known Problems Paternal Uncle     No Known Problems Maternal Grandmother     No Known Problems Maternal Grandfather     No Known Problems Paternal Grandmother     No Known Problems Paternal Grandfather     Melanoma Neg Hx     Psoriasis Neg Hx     Lupus Neg Hx     Eczema Neg Hx     Amblyopia Neg Hx     Blindness Neg Hx     Cataracts Neg Hx     Diabetes Neg Hx     Glaucoma Neg Hx     Macular degeneration Neg Hx     Retinal detachment Neg Hx     Strabismus Neg Hx     Stroke Neg Hx     Thyroid disease Neg Hx     Acne Neg Hx        Social History     Socioeconomic History    Marital status:      Spouse name: Not on file    Number of children: Not on file    Years of education: Not on file    Highest education level: Not on file   Occupational History    Occupation: Retired    Social Needs    Financial resource strain: Not hard at all    Food insecurity:     Worry: Never true     Inability: Never true    Transportation needs:     Medical: No     Non-medical: No   Tobacco Use    Smoking status: Never Smoker    Smokeless tobacco: Never Used   Substance and Sexual Activity    Alcohol use: Yes     Frequency: 2-4 times a month     Drinks per session: 3 or 4     Binge frequency: Less than monthly     Comment: occasionally, beer    Drug use: No    Sexual activity: Yes  "  Lifestyle    Physical activity:     Days per week: Not on file     Minutes per session: Not on file    Stress: Not on file   Relationships    Social connections:     Talks on phone: Three times a week     Gets together: Once a week     Attends [employer] service: More than 4 times per year     Active member of club or organization: Yes     Attends meetings of clubs or organizations: More than 4 times per year     Relationship status:    Other Topics Concern    Not on file   Social History Narrative    Not on file       Review of Systems   Constitution: Negative for chills and fever.   Cardiovascular: Negative for claudication and leg swelling.   Respiratory: Negative for cough and shortness of breath.    Skin: Positive for dry skin and poor wound healing. Negative for itching and rash.   Musculoskeletal: Positive for arthritis, joint pain, joint swelling and myalgias. Negative for falls and muscle weakness.   Gastrointestinal: Negative for diarrhea, nausea and vomiting.   Neurological: Positive for paresthesias. Negative for numbness, tremors and weakness.   Psychiatric/Behavioral: Negative for altered mental status and hallucinations.         Objective:       Vitals:    12/31/19 1421   Weight: (!) 152.9 kg (337 lb)   Height: 6' 1" (1.854 m)   PainSc: 0-No pain       Physical Exam   Constitutional:  Non-toxic appearance. He does not have a sickly appearance. No distress.   Pt. is well-developed, well-nourished, appears stated age, in no acute distress, alert and oriented x 3. No evidence of depression, anxiety, or agitation. Calm, cooperative, and communicative. Appropriate interactions and affect.   Cardiovascular:   Pulses:       Dorsalis pedis pulses are 2+ on the right side, and 2+ on the left side.        Posterior tibial pulses are 1+ on the right side, and 1+ on the left side.   There is decreased digital hair. Skin is atrophic, slightly hyperpigmented, and pitting edema nora (L>>R)   "   Pulmonary/Chest: No respiratory distress.   Musculoskeletal:        Right ankle: He exhibits swelling. No tenderness. No lateral malleolus, no medial malleolus, no AITFL, no CF ligament, no head of 5th metatarsal and no proximal fibula tenderness found. Achilles tendon exhibits no pain, no defect and normal Zabala's test results.        Left ankle: He exhibits swelling. No tenderness. No lateral malleolus, no medial malleolus, no AITFL, no CF ligament and no posterior TFL tenderness found. Achilles tendon exhibits no pain, no defect and normal Zabala's test results.        Right foot: There is no tenderness and no bony tenderness.        Left foot: There is no tenderness and no bony tenderness.   Decreased stride, station of gait.  apropulsive toe off.  Increased angle and base of gait.    Patient has hammertoes of digits 2-5 bilateral partially reducible without symptom today.     There is equinus deformity bilateral. There is limitation of dorsiflexion with knees extended and with knees flexed.    Shoes reveals lateral heel counter wear bilateral in athletic shoes.        Lymphadenopathy:   No lymphatic streaking    Negative lymphadenopathy bilateral popliteal fossa and tarsal tunnel.     Neurological:   [place] 5.07 monofilament is intact bilateral feet. Sharp/dull sensation is also intact Bilateral feet. Proprioception is grossly intact. Vibratory sensation intact (pt able to sense vibration stop within 3-5 seconds)     Skin: Skin is warm and dry. Abrasion (left anterior leg as pictured, to level of subQ. local edema and erythema.  fibrogranular base.  no fluctuance, no crepitus, no purulence) and lesion noted. No bruising, no burn, no laceration and no rash noted. He is not diaphoretic. No cyanosis or erythema. No pallor. Nails show no clubbing.   Ulcer location: medial left ankle  Signs of infection: none  Drainage: none  Purulence: no  Crepitus/fluctuance: no  Periwound: Reddened  Base: thin  epithelial itssue  Depth: skin  Probe to bone: no      Ulcer location: posterior left heel  Signs of infection:none  Drainage:  None  Periwound: intact  Base: thin epithelial with bleeding within layers     Psychiatric: His mood appears not anxious. His affect is not inappropriate. His speech is not slurred. He is not combative. He is communicative. He is attentive.   Nursing note reviewed.    12/31/19: Epic unable to load pic.     12/19/2019          12/10/19          11/14/19          Assessment:       Encounter Diagnoses   Name Primary?    PVD (peripheral vascular disease) Yes    Venous stasis ulcer of left lower leg with edema of left lower leg          Plan:       Janusz was seen today for wound check and wound care.    Diagnoses and all orders for this visit:    PVD (peripheral vascular disease)    Venous stasis ulcer of left lower leg with edema of left lower leg      I counseled the patient on his conditions, their implications and medical management.    The site is cleansed of foreign material as much as possible. The patient is alerted to watch for any signs of infection (redness, pus, pain, increased swelling or fever) and call if such occurs.    Wound has improved    Endoform, Hydrofera Blue, and unna boot applied to LLE. Well tolerated       Patient is to elevate legs. When sleeping, place a pillow under lower extremities. When sitting, support the legs so that they are level with the waist.    Follow-up:Patient is to return to the clinic in 1-2 weeks for follow-up but should call [facility] immediately if any signs of infection, such as fever, chills, sweats, increased redness or pain.    If wound does not improve he may require biopsy given his history of multiple skin cancers.  Discussed this with patient and he agrees.    Short-term goals include maintaining good offloading and minimizing bioburden, promoting granulation and epithelialization to healing.  Long-term goals include keeping the wound  healed by good offloading and medical management under the direction of internist.

## 2020-01-02 RX ORDER — FLUTICASONE PROPIONATE 50 MCG
1 SPRAY, SUSPENSION (ML) NASAL DAILY
Qty: 1 BOTTLE | Refills: 0 | Status: SHIPPED | OUTPATIENT
Start: 2020-01-02 | End: 2022-01-01

## 2020-01-02 NOTE — TELEPHONE ENCOUNTER
Spoke with the pt and he told the Endoscopy department about the infection in his leg.  Pt states the department canceled it.  Pt is still dealing with infection on his left leg.Patient verbalized understandings.

## 2020-01-02 NOTE — TELEPHONE ENCOUNTER
----- Message from Fredy Ramon sent at 1/2/2020  9:11 AM CST -----  Contact: Pt  Pt called and would like a refill for fluticasone propionate (FLONASE) 50 mcg/actuation nasal spray [[id number]]     Pt can be reached at [phone]

## 2020-01-06 ENCOUNTER — PATIENT OUTREACH (OUTPATIENT)
Dept: ADMINISTRATIVE | Facility: OTHER | Age: 75
End: 2020-01-06

## 2020-01-06 NOTE — PATIENT INSTRUCTIONS
Patient has open case request for colonoscopy.  [facility] order not entered   Subjective:       Patient ID: Alysia Ross is a 89 y.o. female.    Chief Complaint: Follow-up (6 months)    Pt is a 89 y.o. female who presents for evaluation and management of   Encounter Diagnoses   Name Primary?    Dyslipidemia Yes    Essential hypertension     Osteoporosis, unspecified osteoporosis type, unspecified pathological fracture presence     History of CVA (cerebrovascular accident)     Severe obesity (BMI 35.0-35.9 with comorbidity)     Vitamin D deficiency disease     Tremor     Dependent edema     Depression, unspecified depression type     Hyperlipidemia, unspecified hyperlipidemia type     Hypokalemia     Osteoarthritis, unspecified osteoarthritis type, unspecified site    .  Doing well on current meds. Denies any side effects. Prevention is up to date.  Review of Systems   Constitutional: Negative for chills and fever.   Respiratory: Negative for shortness of breath.    Cardiovascular: Positive for leg swelling. Negative for chest pain and palpitations.   Gastrointestinal: Negative for abdominal pain, blood in stool, constipation and nausea.   Genitourinary: Negative for difficulty urinating.   Musculoskeletal: Positive for arthralgias and back pain.   Psychiatric/Behavioral: Negative for dysphoric mood, sleep disturbance and suicidal ideas. The patient is not nervous/anxious.        Objective:      Physical Exam   Constitutional: She is oriented to person, place, and time. She appears well-developed and well-nourished.   HENT:   Head: Normocephalic and atraumatic.   Right Ear: External ear normal.   Left Ear: External ear normal.   Nose: Nose normal.   Mouth/Throat: Oropharynx is clear and moist.   Eyes: EOM are normal. Pupils are equal, round, and reactive to light.   Neck: Normal range of motion. Neck supple. No JVD present. No tracheal deviation present. No thyromegaly present.   Cardiovascular: Normal rate, normal heart sounds and intact distal pulses.    No murmur  heard.  Pulmonary/Chest: Effort normal and breath sounds normal. No respiratory distress. She has no wheezes. She has no rales. She exhibits no tenderness.   Abdominal: Soft. Bowel sounds are normal. She exhibits no distension and no mass. There is no tenderness. There is no rebound and no guarding.   Musculoskeletal: Normal range of motion. She exhibits edema. She exhibits no tenderness.   Lymphadenopathy:     She has no cervical adenopathy.   Neurological: She is alert and oriented to person, place, and time. She has normal reflexes. She displays normal reflexes. No cranial nerve deficit. She exhibits normal muscle tone. Coordination normal.   Skin: Skin is warm and dry. No rash noted. No erythema. No pallor.   Psychiatric: She has a normal mood and affect. Her behavior is normal. Judgment and thought content normal.       Assessment:       1. Dyslipidemia    2. Essential hypertension    3. Osteoporosis, unspecified osteoporosis type, unspecified pathological fracture presence    4. History of CVA (cerebrovascular accident)    5. Severe obesity (BMI 35.0-35.9 with comorbidity)    6. Vitamin D deficiency disease    7. Tremor    8. Dependent edema    9. Depression, unspecified depression type    10. Hyperlipidemia, unspecified hyperlipidemia type    11. Hypokalemia    12. Osteoarthritis, unspecified osteoarthritis type, unspecified site        Plan:   Alysia ROBERTS was seen today for follow-up.    Diagnoses and all orders for this visit:    Dyslipidemia  -     Comprehensive metabolic panel; Future  -     Lipid panel; Future  -     TSH; Future    Essential hypertension  -     Comprehensive metabolic panel; Future  -     Lipid panel; Future  -     TSH; Future  -     spironolactone-hydrochlorothiazide 25-25mg (ALDACTAZIDE) 25-25 mg Tab; Take 1 tablet by mouth once daily.  -     amLODIPine (NORVASC) 2.5 MG tablet; Take 1 tablet (2.5 mg total) by mouth once daily.  -     lisinopril (PRINIVIL,ZESTRIL) 40 MG tablet; Take 1  tablet (40 mg total) by mouth once daily.  -     metoprolol succinate (TOPROL-XL) 25 MG 24 hr tablet; Take 1 tablet (25 mg total) by mouth once daily.    Osteoporosis, unspecified osteoporosis type, unspecified pathological fracture presence  -     alendronate (FOSAMAX) 70 MG tablet; Take 1 tablet (70 mg total) by mouth every 7 days.    History of CVA (cerebrovascular accident)    Severe obesity (BMI 35.0-35.9 with comorbidity)    Vitamin D deficiency disease  -     Vitamin D; Future    Tremor  -     primidone (MYSOLINE) 50 MG Tab; Take 1 tablet (50 mg total) by mouth 3 (three) times daily.    Dependent edema  -     spironolactone-hydrochlorothiazide 25-25mg (ALDACTAZIDE) 25-25 mg Tab; Take 1 tablet by mouth once daily.    Depression, unspecified depression type  -     citalopram (CELEXA) 10 MG tablet; Take 1 tablet (10 mg total) by mouth once daily.  -     clonazePAM (KLONOPIN) 0.5 MG tablet; TAKE ONE-HALF TABLET BY MOUTH ONCE DAILY    Hyperlipidemia, unspecified hyperlipidemia type  -     simvastatin (ZOCOR) 40 MG tablet; Take 1 tablet (40 mg total) by mouth every evening.    Hypokalemia  -     potassium chloride (K-TAB) 20 mEq; Take 1 tablet (20 mEq total) by mouth once daily.    Osteoarthritis, unspecified osteoarthritis type, unspecified site  -     traMADol (ULTRAM) 50 mg tablet; Take 1 tablet (50 mg total) by mouth every 6 (six) hours as needed.      Problem List Items Addressed This Visit     Dependent edema    Relevant Medications    spironolactone-hydrochlorothiazide 25-25mg (ALDACTAZIDE) 25-25 mg Tab    Depression    Relevant Medications    citalopram (CELEXA) 10 MG tablet    clonazePAM (KLONOPIN) 0.5 MG tablet    Dyslipidemia - Primary    Relevant Orders    Comprehensive metabolic panel    Lipid panel    TSH    History of CVA (cerebrovascular accident)    HTN (hypertension)    Relevant Medications    spironolactone-hydrochlorothiazide 25-25mg (ALDACTAZIDE) 25-25 mg Tab    amLODIPine (NORVASC) 2.5 MG  tablet    lisinopril (PRINIVIL,ZESTRIL) 40 MG tablet    metoprolol succinate (TOPROL-XL) 25 MG 24 hr tablet    Other Relevant Orders    Comprehensive metabolic panel    Lipid panel    TSH    Hyperlipidemia    Relevant Medications    simvastatin (ZOCOR) 40 MG tablet    Hypokalemia    Relevant Medications    potassium chloride (K-TAB) 20 mEq    Osteoporosis    Relevant Medications    alendronate (FOSAMAX) 70 MG tablet    Severe obesity (BMI 35.0-35.9 with comorbidity)    Tremor    Relevant Medications    primidone (MYSOLINE) 50 MG Tab      Other Visit Diagnoses     Vitamin D deficiency disease        Relevant Orders    Vitamin D    Osteoarthritis, unspecified osteoarthritis type, unspecified site        Relevant Medications    traMADol (ULTRAM) 50 mg tablet        No Follow-up on file.

## 2020-01-07 ENCOUNTER — OFFICE VISIT (OUTPATIENT)
Dept: PODIATRY | Facility: CLINIC | Age: 75
End: 2020-01-07
Payer: MEDICARE

## 2020-01-07 VITALS
HEIGHT: 73 IN | WEIGHT: 315 LBS | SYSTOLIC BLOOD PRESSURE: 142 MMHG | HEART RATE: 81 BPM | DIASTOLIC BLOOD PRESSURE: 63 MMHG | BODY MASS INDEX: 41.75 KG/M2

## 2020-01-07 DIAGNOSIS — R60.0 VENOUS STASIS ULCER OF LEFT LOWER LEG WITH EDEMA OF LEFT LOWER LEG: ICD-10-CM

## 2020-01-07 DIAGNOSIS — I87.8 VENOUS STASIS OF LOWER EXTREMITY: ICD-10-CM

## 2020-01-07 DIAGNOSIS — I83.892 VENOUS STASIS ULCER OF LEFT LOWER LEG WITH EDEMA OF LEFT LOWER LEG: ICD-10-CM

## 2020-01-07 DIAGNOSIS — I73.9 PVD (PERIPHERAL VASCULAR DISEASE): Primary | ICD-10-CM

## 2020-01-07 DIAGNOSIS — L84 PRE-ULCERATIVE CALLUSES: ICD-10-CM

## 2020-01-07 DIAGNOSIS — L90.9 PLANTAR FAT PAD ATROPHY: ICD-10-CM

## 2020-01-07 DIAGNOSIS — L97.929 VENOUS STASIS ULCER OF LEFT LOWER LEG WITH EDEMA OF LEFT LOWER LEG: ICD-10-CM

## 2020-01-07 DIAGNOSIS — I83.029 VENOUS STASIS ULCER OF LEFT LOWER LEG WITH EDEMA OF LEFT LOWER LEG: ICD-10-CM

## 2020-01-07 PROCEDURE — 99213 PR OFFICE/OUTPT VISIT, EST, LEVL III, 20-29 MIN: ICD-10-PCS | Mod: HCNC,S$GLB,, | Performed by: PODIATRIST

## 2020-01-07 PROCEDURE — 3078F PR MOST RECENT DIASTOLIC BLOOD PRESSURE < 80 MM HG: ICD-10-PCS | Mod: HCNC,CPTII,S$GLB, | Performed by: PODIATRIST

## 2020-01-07 PROCEDURE — 99213 OFFICE O/P EST LOW 20 MIN: CPT | Mod: HCNC,S$GLB,, | Performed by: PODIATRIST

## 2020-01-07 PROCEDURE — 3077F SYST BP >= 140 MM HG: CPT | Mod: HCNC,CPTII,S$GLB, | Performed by: PODIATRIST

## 2020-01-07 PROCEDURE — 3077F PR MOST RECENT SYSTOLIC BLOOD PRESSURE >= 140 MM HG: ICD-10-PCS | Mod: HCNC,CPTII,S$GLB, | Performed by: PODIATRIST

## 2020-01-07 PROCEDURE — 1101F PR PT FALLS ASSESS DOC 0-1 FALLS W/OUT INJ PAST YR: ICD-10-PCS | Mod: HCNC,CPTII,S$GLB, | Performed by: PODIATRIST

## 2020-01-07 PROCEDURE — 3078F DIAST BP <80 MM HG: CPT | Mod: HCNC,CPTII,S$GLB, | Performed by: PODIATRIST

## 2020-01-07 PROCEDURE — 99999 PR PBB SHADOW E&M-EST. PATIENT-LVL III: CPT | Mod: PBBFAC,HCNC,, | Performed by: PODIATRIST

## 2020-01-07 PROCEDURE — 1159F PR MEDICATION LIST DOCUMENTED IN MEDICAL RECORD: ICD-10-PCS | Mod: HCNC,S$GLB,, | Performed by: PODIATRIST

## 2020-01-07 PROCEDURE — 1125F PR PAIN SEVERITY QUANTIFIED, PAIN PRESENT: ICD-10-PCS | Mod: HCNC,S$GLB,, | Performed by: PODIATRIST

## 2020-01-07 PROCEDURE — 99999 PR PBB SHADOW E&M-EST. PATIENT-LVL III: ICD-10-PCS | Mod: PBBFAC,HCNC,, | Performed by: PODIATRIST

## 2020-01-07 PROCEDURE — 1159F MED LIST DOCD IN RCRD: CPT | Mod: HCNC,S$GLB,, | Performed by: PODIATRIST

## 2020-01-07 PROCEDURE — 1125F AMNT PAIN NOTED PAIN PRSNT: CPT | Mod: HCNC,S$GLB,, | Performed by: PODIATRIST

## 2020-01-07 PROCEDURE — 1101F PT FALLS ASSESS-DOCD LE1/YR: CPT | Mod: HCNC,CPTII,S$GLB, | Performed by: PODIATRIST

## 2020-01-08 NOTE — PROGRESS NOTES
Infrequent BP readings but at goal, defer to health  outreach  BP average 122/66 mmHg  Imdur 30 mg daily added by Dr. Sandhu at 12/11/19 appointment due to abnormal stress findings    Hypertension Medications             furosemide (LASIX) 20 MG tablet Take 1 tablet (20 mg total) by mouth once daily.    isosorbide mononitrate (IMDUR) 30 MG 24 hr tablet Take 1 tablet (30 mg total) by mouth once daily.    nitroGLYCERIN (NITROSTAT) 0.4 MG SL tablet Place 1 tablet (0.4 mg total) under the tongue every 5 (five) minutes as needed for Chest pain.    olmesartan (BENICAR) 20 MG tablet Take 1 tablet (20 mg total) by mouth once daily. (replaces ramipril for blood pressure)

## 2020-01-09 ENCOUNTER — CLINICAL SUPPORT (OUTPATIENT)
Dept: CARDIOLOGY | Facility: HOSPITAL | Age: 75
End: 2020-01-09
Attending: INTERNAL MEDICINE
Payer: MEDICARE

## 2020-01-09 DIAGNOSIS — Z95.818 STATUS POST PLACEMENT OF IMPLANTABLE LOOP RECORDER: ICD-10-CM

## 2020-01-09 PROCEDURE — 93298 CARDIAC DEVICE CHECK - REMOTE: ICD-10-PCS | Mod: HCNC,,, | Performed by: INTERNAL MEDICINE

## 2020-01-09 PROCEDURE — 93298 REM INTERROG DEV EVAL SCRMS: CPT | Mod: HCNC,,, | Performed by: INTERNAL MEDICINE

## 2020-01-09 NOTE — PROGRESS NOTES
Digital Medicine: Health  Follow-Up    The history is provided by the patient.       Intervention/Plan        Topic    Lipid (Cholesterol) Test        Last 5 Patient Entered Readings                                      Current 30 Day Average: 122/66     Recent Readings 1/6/2020 12/17/2019 11/17/2019 10/30/2019 10/30/2019    SBP (mmHg) 119 124 138 124 134    DBP (mmHg) 65 67 68 74 68    Pulse 81 85 75 85 83                  Screenings    SDOH

## 2020-01-12 NOTE — PROGRESS NOTES
Subjective:      Patient ID: Janusz Montgomery Jr. is a 74 y.o. male.    Chief Complaint: Foot Pain (ov 6/26/19 [facility] pcp- pain to left heel with dry scaly area) and Wound Care (left inner lateral lower leg)      Janusz Montgomery Jr. is a 74 y.o. malewho presents to the clinic for evaluation and treatment of high risk feet. Janusz Montgomery Jr.   has a past medical history of NAT (acute kidney injury) (3/19/2017), ALLERGIC RHINITIS, Anemia, Anxiety, Basal cell carcinoma (10/19/2018), Basal cell carcinoma (01/09/2019), Chronic rhinitis (5/3/2013), Chronic rhinitis (5/3/2013), Coronary artery disease involving native coronary artery of native heart without angina pectoris s/p RCA stent, Cortical cataract of both eyes (3/18/2016), Delayed sleep phase syndrome (3/13/2019), Depression, Erectile dysfunction (3/24/2014), Erectile dysfunction (3/24/2014), Essential hypertension, GERD (gastroesophageal reflux disease) (7/25/2012), Gout, arthritis, Grade III open fracture of left tibia and fibula s/p ex-fix on 7/1/16 and ORIF of left tibia on 7/15 (7/6/2016), H/O: iritis, Helicobacter pylori (H. 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  The patient's chief complaint is an anterior left leg wound.  One month duration.  Secondary to mild trauma in the presence of edema.  Has attempted self treatment but wound has not healed.  Non painful. Denies nausea, vomiting, fever, chills. History of left foot and ankle ulcers.  This patient has documented high risk feet requiring routine maintenance secondary to peripheral vascular disease.      12/10/2019  Patient relates he has attempted to care for leg ulcer as instructed but wound dimensions have not improved.    12/19/2019 patient relates that he has kept dressing clean dry and intact without itching or discomfort.  He has begun taking antibiotics as prescribed without nausea, vomiting, diarrhea, fever, chills.  No new pedal complaints.    12/31/19: F/u left leg ulceration. Has been in unna boot without issue.     01/07/2020 Patient relates that he has kept dressing clean dry and intact without itching or discomfort. He relates increased callus to posterior heel.  He denies nausea, vomiting, diarrhea, fever, chills.  No new pedal complaints.    PCP: Miguelina Weathers MD    Date Last Seen by PCP:   Chief Complaint   Patient presents with    Foot Pain     ov 6/26/19 J[facility] pcp- pain to left heel with dry scaly area    Wound Care     left inner lateral lower leg     Current shoe gear: tennis shoe    Hemoglobin A1C   Date Value Ref Range Status   07/11/2016 5.3 4.5 - 6.2 % Final     Comment:     According to ADA guidelines, hemoglobin A1C <7.0% represents  optimal control in non-pregnant diabetic patients.  Different  metrics may apply to specific populations.   Standards of Medical Care in Diabetes - 2016.  For the purpose of screening for the presence of diabetes:  <5.7%     Consistent with the absence of diabetes  5.7-6.4%  Consistent with increasing risk for diabetes   (prediabetes)  >or=6.5%  Consistent with  diabetes  Currently no consensus exists for use of hemoglobin A1C  for diagnosis of diabetes for children.     03/31/2015 5.3 4.5 - 6.2 % Final   03/03/2014 5.6 4.5 - 6.2 % Final         Current Outpatient Medications on File Prior to Visit   Medication Sig Dispense Refill    acyclovir (ZOVIRAX) 800 MG Tab Take 1 tablet (800 mg total) by mouth 2 (two) times daily. 180 tablet 6    albuterol (PROVENTIL/VENTOLIN HFA) 90 mcg/actuation inhaler Inhale 2 puffs into the lungs every 6 (six) hours as needed for Wheezing. Rescue 18 g 0    apixaban (ELIQUIS) 5 mg Tab Take 1 tablet (5 mg total) by mouth 2 (two) times daily. 180 tablet 4    artificial tears (ISOPTO TEARS) 0.5 % ophthalmic solution Place 1 drop into both eyes as needed. (Patient taking differently: Place 1 drop into both eyes as needed (for dry eyes). )      aspirin (ECOTRIN) 81 MG EC tablet Take 1 tablet (81 mg total) by mouth once daily.  0    atorvastatin (LIPITOR) 40 MG tablet Take 1 tablet (40 mg total) by mouth once daily. Please make a follow up visit with your doctor for more refills of this medication 90 tablet 0    azelastine (ASTELIN) 137 mcg (0.1 %) nasal spray 1 spray (137 mcg total) by Nasal route 2 (two) times daily. 30 mL 2    azithromycin (Z-JESSICA) 250 MG tablet Take 2 tablets by mouth on day 1; Take 1 tablet by mouth on days 2-5 6 tablet 0    CYANOCOBALAMIN, VITAMIN B-12, (VITAMIN B-12 ORAL) Take 2,500 mcg by mouth once daily.      erythromycin (ROMYCIN) ophthalmic ointment Place into both eyes 3 (three) times daily. 1 g 1    fluticasone propionate (FLONASE) 50 mcg/actuation nasal spray 1 spray (50 mcg total) by Each Nostril route once daily. 1 Bottle 0    furosemide (LASIX) 20 MG tablet Take 1 tablet (20 mg total) by mouth once daily. (Patient taking differently: Take 20 mg by mouth daily as needed. ) 90 tablet 0    gabapentin (NEURONTIN) 300 MG capsule Take 1 capsule (300 mg total) by mouth 2 (two) times daily. (Patient taking  differently: Take 300 mg by mouth 2 (two) times daily as needed. ) 60 capsule 11    isosorbide mononitrate (IMDUR) 30 MG 24 hr tablet Take 1 tablet (30 mg total) by mouth once daily. 30 tablet 11    melatonin 5 mg Tab Take 10 mg by mouth nightly.      multivitamin (MULTIPLE VITAMINS) per tablet Take 1 tablet by mouth once daily.      nitroGLYCERIN (NITROSTAT) 0.4 MG SL tablet Place 1 tablet (0.4 mg total) under the tongue every 5 (five) minutes as needed for Chest pain. 25 tablet 11    olmesartan (BENICAR) 20 MG tablet Take 1 tablet (20 mg total) by mouth once daily. (replaces ramipril for blood pressure) 90 tablet 1    omeprazole (PRILOSEC) 20 MG capsule Take 1 capsule (20 mg total) by mouth daily as needed. 90 capsule 0    oxyCODONE-acetaminophen (PERCOCET) 5-325 mg per tablet Take 1 tablet by mouth every 4 to 6 hours as needed for Pain. 20 tablet 0    tamsulosin (FLOMAX) 0.4 mg Cap Take 1 capsule (0.4 mg total) by mouth once daily. 90 capsule 3    venlafaxine (EFFEXOR) 75 MG tablet Take 2 tablets (150 mg total) by mouth 2 (two) times daily. 360 tablet 0    vitamin D 1000 units Tab Take 1 tablet (1,000 Units total) by mouth once daily.       No current facility-administered medications on file prior to visit.        Allergies   Allergen Reactions    Ciprofloxacin Rash     Diffuse pruritic morbilliform rash developed 3/15/2017 after dose of cipro; previously in 2/2017 he had rash/fevers after initiation of cipro    Zosyn [Piperacillin-Tazobactam] Anaphylaxis     Diffuse pruritic morbilliform rash developed 3/15/2017.  Then, 430am dose on 3/16 and rash worsened with SOB/tachypnea but no hypoxemia.     Bacitracin Itching and Rash     Violaceous rash in area of topical Tx.        Past Surgical History:   Procedure Laterality Date    Cardiac stenting x2      CATARACT EXTRACTION W/  INTRAOCULAR LENS IMPLANT Right 3/29/2016    Dr. Conteh    CATARACT EXTRACTION W/  INTRAOCULAR LENS IMPLANT Left  4/12/2016        EXTERNAL FIXATION TIBIAL FRACTURE Left 07/01/2016    INSERTION OF IMPLANTABLE LOOP RECORDER  04/07/2017    ORIF TIBIA FRACTURE Left 07/15/2016    RADIOACTIVE SEED IMPLANTATION OF PROSTATE N/A 8/8/2018    Procedure: INSERTION, RADIOACTIVE SEED, PROSTATE;  Surgeon: Bipin Thompson MD;  Location: [facility] OR [address] [place] [address];  Service: Urology;  Laterality: N/A;  1 hour    Squamous cell cancer removal x3 with Mohs surgery      TONSILLECTOMY      TOTAL KNEE ARTHROPLASTY  10/2012    trus/bx         Family History   Problem Relation Age of Onset    Skin cancer Father     Lung cancer Father     Cancer Father         smoker,     Alzheimer's disease Mother     Hypertension Mother     Cancer Sister         colon, lung cancer     No Known Problems Sister     Cancer Brother         skin cancer, polypectomy     Peripheral vascular disease Unknown     No Known Problems Maternal Aunt     No Known Problems Maternal Uncle     No Known Problems Paternal Aunt     No Known Problems Paternal Uncle     No Known Problems Maternal Grandmother     No Known Problems Maternal Grandfather     No Known Problems Paternal Grandmother     No Known Problems Paternal Grandfather     Melanoma Neg Hx     Psoriasis Neg Hx     Lupus Neg Hx     Eczema Neg Hx     Amblyopia Neg Hx     Blindness Neg Hx     Cataracts Neg Hx     Diabetes Neg Hx     Glaucoma Neg Hx     Macular degeneration Neg Hx     Retinal detachment Neg Hx     Strabismus Neg Hx     Stroke Neg Hx     Thyroid disease Neg Hx     Acne Neg Hx        Social History     Socioeconomic History    Marital status:      Spouse name: Not on file    Number of children: Not on file    Years of education: Not on file    Highest education level: Not on file   Occupational History    Occupation: Retired    Social Needs    Financial resource strain: Not hard at all    Food insecurity:     Worry: Never true     Inability: Never true     "Transportation needs:     Medical: No     Non-medical: No   Tobacco Use    Smoking status: Never Smoker    Smokeless tobacco: Never Used   Substance and Sexual Activity    Alcohol use: Yes     Frequency: 2-4 times a month     Drinks per session: 3 or 4     Binge frequency: Less than monthly     Comment: occasionally, beer    Drug use: No    Sexual activity: Yes   Lifestyle    Physical activity:     Days per week: Not on file     Minutes per session: Not on file    Stress: Not on file   Relationships    Social connections:     Talks on phone: Three times a week     Gets together: Once a week     Attends [employer] service: More than 4 times per year     Active member of club or organization: Yes     Attends meetings of clubs or organizations: More than 4 times per year     Relationship status:    Other Topics Concern    Not on file   Social History Narrative    Not on file       Review of Systems   Constitution: Negative for chills and fever.   Cardiovascular: Negative for claudication and leg swelling.   Respiratory: Negative for cough and shortness of breath.    Skin: Positive for dry skin and poor wound healing. Negative for itching and rash.   Musculoskeletal: Positive for arthritis, joint pain, joint swelling and myalgias. Negative for falls and muscle weakness.   Gastrointestinal: Negative for diarrhea, nausea and vomiting.   Neurological: Positive for paresthesias. Negative for numbness, tremors and weakness.   Psychiatric/Behavioral: Negative for altered mental status and hallucinations.         Objective:       Vitals:    01/07/20 1615   BP: (!) 142/63   Pulse: 81   Weight: (!) 152.9 kg (337 lb 1.3 oz)   Height: 6' 1" (1.854 m)   PainSc:   3       Physical Exam   Constitutional:  Non-toxic appearance. He does not have a sickly appearance. No distress.   Pt. is well-developed, well-nourished, appears stated age, in no acute distress, alert and oriented x 3. No evidence of depression, anxiety, " or agitation. Calm, cooperative, and communicative. Appropriate interactions and affect.   Cardiovascular:   Pulses:       Dorsalis pedis pulses are 2+ on the right side, and 2+ on the left side.        Posterior tibial pulses are 1+ on the right side, and 1+ on the left side.   There is decreased digital hair. Skin is atrophic, slightly hyperpigmented, and pitting edema nora (L>>R)     Pulmonary/Chest: No respiratory distress.   Musculoskeletal:        Right ankle: He exhibits swelling. No tenderness. No lateral malleolus, no medial malleolus, no AITFL, no CF ligament, no head of 5th metatarsal and no proximal fibula tenderness found. Achilles tendon exhibits no pain, no defect and normal Zabala's test results.        Left ankle: He exhibits swelling. No tenderness. No lateral malleolus, no medial malleolus, no AITFL, no CF ligament and no posterior TFL tenderness found. Achilles tendon exhibits no pain, no defect and normal Zabala's test results.        Right foot: There is no tenderness and no bony tenderness.        Left foot: There is no tenderness and no bony tenderness.   Decreased stride, station of gait.  apropulsive toe off.  Increased angle and base of gait.    Patient has hammertoes of digits 2-5 bilateral partially reducible without symptom today.     There is equinus deformity bilateral. There is limitation of dorsiflexion with knees extended and with knees flexed.    Shoes reveals lateral heel counter wear bilateral in athletic shoes.        Lymphadenopathy:   No lymphatic streaking    Negative lymphadenopathy bilateral popliteal fossa and tarsal tunnel.     Neurological:   [place] 5.07 monofilament is intact bilateral feet. Sharp/dull sensation is also intact Bilateral feet. Proprioception is grossly intact. Vibratory sensation intact (pt able to sense vibration stop within 3-5 seconds)     Skin: Skin is warm and dry. Abrasion (left anterior leg as pictured, to level of subQ. local edema.   Granular base.  no fluctuance, no crepitus, no purulence) and lesion noted. No bruising, no burn, no laceration and no rash noted. He is not diaphoretic. No cyanosis or erythema. No pallor. Nails show no clubbing.   Ulcer location: medial left ankle  Signs of infection: none  Drainage: none  Purulence: no  Crepitus/fluctuance: no  Periwound: Reddened  Base: thin epithelial itssue  Depth: skin  Probe to bone: no      Ulcer location: posterior left heel  Signs of infection:none  Drainage:  None  Periwound: intact  Base: thin epithelial with bleeding within layers     Psychiatric: His mood appears not anxious. His affect is not inappropriate. His speech is not slurred. He is not combative. He is communicative. He is attentive.   Nursing note reviewed.    01/07/20 12/31/19: Epic unable to load pic.     12/19/2019          12/10/19          11/14/19          Assessment:       Encounter Diagnoses   Name Primary?    PVD (peripheral vascular disease) Yes    Venous stasis ulcer of left lower leg with edema of left lower leg     Venous stasis of lower extremity     Pre-ulcerative calluses     Plantar fat pad atrophy          Plan:       Janusz was seen today for foot pain and wound care.    Diagnoses and all orders for this visit:    PVD (peripheral vascular disease)    Venous stasis ulcer of left lower leg with edema of left lower leg    Venous stasis of lower extremity    Pre-ulcerative calluses    Plantar fat pad atrophy      I counseled the patient on his conditions, their implications and medical management.    The site is cleansed of foreign material as much as possible. The patient is alerted to watch for any signs of infection (redness, pus, pain, increased swelling or fever) and call if such occurs.    Wound has improved    Endoform, Hydrofera Blue, and unna boot applied to LLE. Well tolerated       Patient is to elevate legs. When sleeping, place a pillow under lower extremities. When sitting, support  the legs so that they are level with the waist.    Follow-up:Patient is to return to the clinic in 1-2 weeks for follow-up but should call [facility] immediately if any signs of infection, such as fever, chills, sweats, increased redness or pain.    If wound does not improve he may require biopsy given his history of multiple skin cancers.  Discussed this with patient and he agrees.    Short-term goals include maintaining good offloading and minimizing bioburden, promoting granulation and epithelialization to healing.  Long-term goals include keeping the wound healed by good offloading and medical management under the direction of internist.

## 2020-01-13 ENCOUNTER — PATIENT OUTREACH (OUTPATIENT)
Dept: ADMINISTRATIVE | Facility: OTHER | Age: 75
End: 2020-01-13

## 2020-01-13 NOTE — PROGRESS NOTES
Patient, Janusz Montgomery Jr. (MRN #[id number]), presented with a recorded BMI of 44.47 kg/m^2 consistent with the definition of morbid obesity (ICD-10 E66.01). The patient's morbid obesity was monitored, evaluated, addressed and/or treated. This addendum to the medical record is made on 01/12/2020.

## 2020-01-14 ENCOUNTER — OFFICE VISIT (OUTPATIENT)
Dept: PODIATRY | Facility: CLINIC | Age: 75
End: 2020-01-14
Payer: MEDICARE

## 2020-01-14 VITALS — BODY MASS INDEX: 41.75 KG/M2 | HEIGHT: 73 IN | WEIGHT: 315 LBS

## 2020-01-14 DIAGNOSIS — R60.0 VENOUS STASIS ULCER OF LEFT LOWER LEG WITH EDEMA OF LEFT LOWER LEG: ICD-10-CM

## 2020-01-14 DIAGNOSIS — L97.929 VENOUS STASIS ULCER OF LEFT LOWER LEG WITH EDEMA OF LEFT LOWER LEG: ICD-10-CM

## 2020-01-14 DIAGNOSIS — I87.8 VENOUS STASIS OF LOWER EXTREMITY: ICD-10-CM

## 2020-01-14 DIAGNOSIS — I83.029 VENOUS STASIS ULCER OF LEFT LOWER LEG WITH EDEMA OF LEFT LOWER LEG: ICD-10-CM

## 2020-01-14 DIAGNOSIS — I83.892 VENOUS STASIS ULCER OF LEFT LOWER LEG WITH EDEMA OF LEFT LOWER LEG: ICD-10-CM

## 2020-01-14 DIAGNOSIS — I73.9 PVD (PERIPHERAL VASCULAR DISEASE): Primary | ICD-10-CM

## 2020-01-14 PROCEDURE — 99999 PR PBB SHADOW E&M-EST. PATIENT-LVL III: CPT | Mod: PBBFAC,HCNC,, | Performed by: PODIATRIST

## 2020-01-14 PROCEDURE — 99213 PR OFFICE/OUTPT VISIT, EST, LEVL III, 20-29 MIN: ICD-10-PCS | Mod: HCNC,S$GLB,, | Performed by: PODIATRIST

## 2020-01-14 PROCEDURE — 1126F PR PAIN SEVERITY QUANTIFIED, NO PAIN PRESENT: ICD-10-PCS | Mod: HCNC,S$GLB,, | Performed by: PODIATRIST

## 2020-01-14 PROCEDURE — 99999 PR PBB SHADOW E&M-EST. PATIENT-LVL III: ICD-10-PCS | Mod: PBBFAC,HCNC,, | Performed by: PODIATRIST

## 2020-01-14 PROCEDURE — 1126F AMNT PAIN NOTED NONE PRSNT: CPT | Mod: HCNC,S$GLB,, | Performed by: PODIATRIST

## 2020-01-14 PROCEDURE — 99213 OFFICE O/P EST LOW 20 MIN: CPT | Mod: HCNC,S$GLB,, | Performed by: PODIATRIST

## 2020-01-14 PROCEDURE — 1101F PT FALLS ASSESS-DOCD LE1/YR: CPT | Mod: HCNC,CPTII,S$GLB, | Performed by: PODIATRIST

## 2020-01-14 PROCEDURE — 1159F MED LIST DOCD IN RCRD: CPT | Mod: HCNC,S$GLB,, | Performed by: PODIATRIST

## 2020-01-14 PROCEDURE — 1159F PR MEDICATION LIST DOCUMENTED IN MEDICAL RECORD: ICD-10-PCS | Mod: HCNC,S$GLB,, | Performed by: PODIATRIST

## 2020-01-14 PROCEDURE — 1101F PR PT FALLS ASSESS DOC 0-1 FALLS W/OUT INJ PAST YR: ICD-10-PCS | Mod: HCNC,CPTII,S$GLB, | Performed by: PODIATRIST

## 2020-01-14 NOTE — PROGRESS NOTES
Subjective:      Patient ID: Janusz Montgomery Jr. is a 74 y.o. male.    Chief Complaint: Wound Check (left leg, Dr Weathers)      Janusz Montgomery Jr. is a 74 y.o. malewho presents to the clinic for evaluation and treatment of high risk feet. Janusz Montgomery Jr.   has a past medical history of NAT (acute kidney injury) (3/19/2017), ALLERGIC RHINITIS, Anemia, Anxiety, Basal cell carcinoma (10/19/2018), Basal cell carcinoma (01/09/2019), Chronic rhinitis (5/3/2013), Chronic rhinitis (5/3/2013), Coronary artery disease involving native coronary artery of native heart without angina pectoris s/p RCA stent, Cortical cataract of both eyes (3/18/2016), Delayed sleep phase syndrome (3/13/2019), Depression, Erectile dysfunction (3/24/2014), Erectile dysfunction (3/24/2014), Essential hypertension, GERD (gastroesophageal reflux disease) (7/25/2012), Gout, arthritis, Grade III open fracture of left tibia and fibula s/p ex-fix on 7/1/16 and ORIF of left tibia on 7/15 (7/6/2016), H/O: iritis, Helicobacter pylori (H. 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  The  patient's chief complaint is an anterior left leg wound.  One month duration.  Secondary to mild trauma in the presence of edema.  Has attempted self treatment but wound has not healed.  Non painful. Denies nausea, vomiting, fever, chills. History of left foot and ankle ulcers.  This patient has documented high risk feet requiring routine maintenance secondary to peripheral vascular disease.    12/10/2019  Patient relates he has attempted to care for leg ulcer as instructed but wound dimensions have not improved.    12/19/2019 patient relates that he has kept dressing clean dry and intact without itching or discomfort.  He has begun taking antibiotics as prescribed without nausea, vomiting, diarrhea, fever, chills.  No new pedal complaints.    12/31/19: F/u left leg ulceration. Has been in unna boot without issue.     01/07/2020 Patient relates that he has kept dressing clean dry and intact without itching or discomfort. He relates increased callus to posterior heel.  He denies nausea, vomiting, diarrhea, fever, chills.  No new pedal complaints.    01/14/2020  Patient relates that he has kept dressing clean dry and intact without itching however he relates that dressing was slipping and therefore somewhat uncomfortable this week.   He denies nausea, vomiting, diarrhea, fever, chills.  No new pedal complaints.      PCP: Miguelina Weathers MD    Date Last Seen by PCP:   Chief Complaint   Patient presents with    Wound Check     left leg, Dr Weathers     Current shoe gear: tennis shoe    Hemoglobin A1C   Date Value Ref Range Status   07/11/2016 5.3 4.5 - 6.2 % Final     Comment:     According to ADA guidelines, hemoglobin A1C <7.0% represents  optimal control in non-pregnant diabetic patients.  Different  metrics may apply to specific populations.   Standards of Medical Care in Diabetes - 2016.  For the purpose of screening for the presence of diabetes:  <5.7%     Consistent with the absence of diabetes  5.7-6.4%   Consistent with increasing risk for diabetes   (prediabetes)  >or=6.5%  Consistent with diabetes  Currently no consensus exists for use of hemoglobin A1C  for diagnosis of diabetes for children.     03/31/2015 5.3 4.5 - 6.2 % Final   03/03/2014 5.6 4.5 - 6.2 % Final         Current Outpatient Medications on File Prior to Visit   Medication Sig Dispense Refill    acyclovir (ZOVIRAX) 800 MG Tab Take 1 tablet (800 mg total) by mouth 2 (two) times daily. 180 tablet 6    apixaban (ELIQUIS) 5 mg Tab Take 1 tablet (5 mg total) by mouth 2 (two) times daily. 180 tablet 4    artificial tears (ISOPTO TEARS) 0.5 % ophthalmic solution Place 1 drop into both eyes as needed. (Patient taking differently: Place 1 drop into both eyes as needed (for dry eyes). )      atorvastatin (LIPITOR) 40 MG tablet Take 1 tablet (40 mg total) by mouth once daily. Please make a follow up visit with your doctor for more refills of this medication 90 tablet 0    azelastine (ASTELIN) 137 mcg (0.1 %) nasal spray 1 spray (137 mcg total) by Nasal route 2 (two) times daily. 30 mL 2    azithromycin (Z-JESSICA) 250 MG tablet Take 2 tablets by mouth on day 1; Take 1 tablet by mouth on days 2-5 6 tablet 0    CYANOCOBALAMIN, VITAMIN B-12, (VITAMIN B-12 ORAL) Take 2,500 mcg by mouth once daily.      erythromycin (ROMYCIN) ophthalmic ointment Place into both eyes 3 (three) times daily. 1 g 1    fluticasone propionate (FLONASE) 50 mcg/actuation nasal spray 1 spray (50 mcg total) by Each Nostril route once daily. 1 Bottle 0    isosorbide mononitrate (IMDUR) 30 MG 24 hr tablet Take 1 tablet (30 mg total) by mouth once daily. 30 tablet 11    melatonin 5 mg Tab Take 10 mg by mouth nightly.      multivitamin (MULTIPLE VITAMINS) per tablet Take 1 tablet by mouth once daily.      nitroGLYCERIN (NITROSTAT) 0.4 MG SL tablet Place 1 tablet (0.4 mg total) under the tongue every 5 (five) minutes as needed for Chest pain. 25 tablet 11    olmesartan (BENICAR) 20 MG  tablet Take 1 tablet (20 mg total) by mouth once daily. (replaces ramipril for blood pressure) 90 tablet 1    oxyCODONE-acetaminophen (PERCOCET) 5-325 mg per tablet Take 1 tablet by mouth every 4 to 6 hours as needed for Pain. 20 tablet 0    tamsulosin (FLOMAX) 0.4 mg Cap Take 1 capsule (0.4 mg total) by mouth once daily. 90 capsule 3    venlafaxine (EFFEXOR) 75 MG tablet Take 2 tablets (150 mg total) by mouth 2 (two) times daily. 360 tablet 0    vitamin D 1000 units Tab Take 1 tablet (1,000 Units total) by mouth once daily.      albuterol (PROVENTIL/VENTOLIN HFA) 90 mcg/actuation inhaler Inhale 2 puffs into the lungs every 6 (six) hours as needed for Wheezing. Rescue 18 g 0    aspirin (ECOTRIN) 81 MG EC tablet Take 1 tablet (81 mg total) by mouth once daily.  0    furosemide (LASIX) 20 MG tablet Take 1 tablet (20 mg total) by mouth once daily. (Patient taking differently: Take 20 mg by mouth daily as needed. ) 90 tablet 0    gabapentin (NEURONTIN) 300 MG capsule Take 1 capsule (300 mg total) by mouth 2 (two) times daily. (Patient taking differently: Take 300 mg by mouth 2 (two) times daily as needed. ) 60 capsule 11    omeprazole (PRILOSEC) 20 MG capsule Take 1 capsule (20 mg total) by mouth daily as needed. 90 capsule 0     No current facility-administered medications on file prior to visit.        Allergies   Allergen Reactions    Ciprofloxacin Rash     Diffuse pruritic morbilliform rash developed 3/15/2017 after dose of cipro; previously in 2/2017 he had rash/fevers after initiation of cipro    Zosyn [Piperacillin-Tazobactam] Anaphylaxis     Diffuse pruritic morbilliform rash developed 3/15/2017.  Then, 430am dose on 3/16 and rash worsened with SOB/tachypnea but no hypoxemia.     Bacitracin Itching and Rash     Violaceous rash in area of topical Tx.        Past Surgical History:   Procedure Laterality Date    Cardiac stenting x2      CATARACT EXTRACTION W/  INTRAOCULAR LENS IMPLANT Right 3/29/2016     Dr. Conteh    CATARACT EXTRACTION W/  INTRAOCULAR LENS IMPLANT Left 4/12/2016        EXTERNAL FIXATION TIBIAL FRACTURE Left 07/01/2016    INSERTION OF IMPLANTABLE LOOP RECORDER  04/07/2017    ORIF TIBIA FRACTURE Left 07/15/2016    RADIOACTIVE SEED IMPLANTATION OF PROSTATE N/A 8/8/2018    Procedure: INSERTION, RADIOACTIVE SEED, PROSTATE;  Surgeon: Bipin Thompson MD;  Location: [facility] OR [address] [place] [address];  Service: Urology;  Laterality: N/A;  1 hour    Squamous cell cancer removal x3 with Mohs surgery      TONSILLECTOMY      TOTAL KNEE ARTHROPLASTY  10/2012    trus/bx         Family History   Problem Relation Age of Onset    Skin cancer Father     Lung cancer Father     Cancer Father         smoker,     Alzheimer's disease Mother     Hypertension Mother     Cancer Sister         colon, lung cancer     No Known Problems Sister     Cancer Brother         skin cancer, polypectomy     Peripheral vascular disease Unknown     No Known Problems Maternal Aunt     No Known Problems Maternal Uncle     No Known Problems Paternal Aunt     No Known Problems Paternal Uncle     No Known Problems Maternal Grandmother     No Known Problems Maternal Grandfather     No Known Problems Paternal Grandmother     No Known Problems Paternal Grandfather     Melanoma Neg Hx     Psoriasis Neg Hx     Lupus Neg Hx     Eczema Neg Hx     Amblyopia Neg Hx     Blindness Neg Hx     Cataracts Neg Hx     Diabetes Neg Hx     Glaucoma Neg Hx     Macular degeneration Neg Hx     Retinal detachment Neg Hx     Strabismus Neg Hx     Stroke Neg Hx     Thyroid disease Neg Hx     Acne Neg Hx        Social History     Socioeconomic History    Marital status:      Spouse name: Not on file    Number of children: Not on file    Years of education: Not on file    Highest education level: Not on file   Occupational History    Occupation: Retired    Social Needs    Financial resource strain: Not hard at all  "   Food insecurity:     Worry: Never true     Inability: Never true    Transportation needs:     Medical: No     Non-medical: No   Tobacco Use    Smoking status: Never Smoker    Smokeless tobacco: Never Used   Substance and Sexual Activity    Alcohol use: Yes     Frequency: 2-4 times a month     Drinks per session: 3 or 4     Binge frequency: Less than monthly     Comment: occasionally, beer    Drug use: No    Sexual activity: Yes   Lifestyle    Physical activity:     Days per week: Not on file     Minutes per session: Not on file    Stress: Not on file   Relationships    Social connections:     Talks on phone: Three times a week     Gets together: Once a week     Attends Orthodoxy service: More than 4 times per year     Active member of club or organization: Yes     Attends meetings of clubs or organizations: More than 4 times per year     Relationship status:    Other Topics Concern    Not on file   Social History Narrative    Not on file       Review of Systems   Constitution: Negative for chills and fever.   Cardiovascular: Negative for claudication and leg swelling.   Respiratory: Negative for cough and shortness of breath.    Skin: Positive for dry skin and poor wound healing. Negative for itching and rash.   Musculoskeletal: Positive for arthritis, joint pain, joint swelling and myalgias. Negative for falls and muscle weakness.   Gastrointestinal: Negative for diarrhea, nausea and vomiting.   Neurological: Positive for paresthesias. Negative for numbness, tremors and weakness.   Psychiatric/Behavioral: Negative for altered mental status and hallucinations.         Objective:       Vitals:    01/14/20 1510   Weight: (!) 152.9 kg (337 lb 1.3 oz)   Height: 6' 1" (1.854 m)   PainSc: 0-No pain       Physical Exam   Constitutional:  Non-toxic appearance. He does not have a sickly appearance. No distress.   Pt. is well-developed, well-nourished, appears stated age, in no acute distress, alert and " oriented x 3. No evidence of depression, anxiety, or agitation. Calm, cooperative, and communicative. Appropriate interactions and affect.   Cardiovascular:   Pulses:       Dorsalis pedis pulses are 2+ on the right side, and 2+ on the left side.        Posterior tibial pulses are 1+ on the right side, and 1+ on the left side.   There is decreased digital hair. Skin is atrophic, slightly hyperpigmented, and pitting edema nora (L>>R)     Pulmonary/Chest: No respiratory distress.   Musculoskeletal:        Right ankle: He exhibits swelling. No tenderness. No lateral malleolus, no medial malleolus, no AITFL, no CF ligament, no head of 5th metatarsal and no proximal fibula tenderness found. Achilles tendon exhibits no pain, no defect and normal Zabala's test results.        Left ankle: He exhibits swelling. No tenderness. No lateral malleolus, no medial malleolus, no AITFL, no CF ligament and no posterior TFL tenderness found. Achilles tendon exhibits no pain, no defect and normal Zabala's test results.        Right foot: There is no tenderness and no bony tenderness.        Left foot: There is no tenderness and no bony tenderness.   Decreased stride, station of gait.  apropulsive toe off.  Increased angle and base of gait.    Patient has hammertoes of digits 2-5 bilateral partially reducible without symptom today.     There is equinus deformity bilateral. There is limitation of dorsiflexion with knees extended and with knees flexed.    Shoes reveals lateral heel counter wear bilateral in athletic shoes.        Lymphadenopathy:   No lymphatic streaking    Negative lymphadenopathy bilateral popliteal fossa and tarsal tunnel.     Neurological:   [place] 5.07 monofilament is intact bilateral feet. Sharp/dull sensation is also intact Bilateral feet. Proprioception is grossly intact. Vibratory sensation intact (pt able to sense vibration stop within 3-5 seconds)     Skin: Skin is warm and dry. Abrasion (left  anterior leg as pictured, to level of subQ. local edema.  Granular base.  no fluctuance, no crepitus, no purulence) and lesion noted. No bruising, no burn, no laceration and no rash noted. He is not diaphoretic. No cyanosis or erythema. No pallor. Nails show no clubbing.   Ulcer location: medial left ankle  Signs of infection: none  Drainage: none  Purulence: no  Crepitus/fluctuance: no  Periwound: Reddened  Base: thin epithelial itssue  Depth: skin  Probe to bone: no      Ulcer location: posterior left heel  Signs of infection:none  Drainage:  None  Periwound: intact  Base: thin epithelial with bleeding within layers     Psychiatric: His mood appears not anxious. His affect is not inappropriate. His speech is not slurred. He is not combative. He is communicative. He is attentive.   Nursing note reviewed.    01/14/20 01/07/20 12/31/19: Epic unable to load pic.     12/19/2019          12/10/19          11/14/19          Assessment:       Encounter Diagnoses   Name Primary?    PVD (peripheral vascular disease) Yes    Venous stasis ulcer of left lower leg with edema of left lower leg     Venous stasis of lower extremity          Plan:       Janusz was seen today for wound check.    Diagnoses and all orders for this visit:    PVD (peripheral vascular disease)    Venous stasis ulcer of left lower leg with edema of left lower leg    Venous stasis of lower extremity      I counseled the patient on his conditions, their implications and medical management.    The site is cleansed of foreign material as much as possible. The patient is alerted to watch for any signs of infection (redness, pus, pain, increased swelling or fever) and call if such occurs.    Wound is stagnant, without signs of infection    Fabienne and unna boot applied to LLE. Well tolerated     Patient is to elevate legs. When sleeping, place a pillow under lower extremities. When sitting, support the legs so that they are level with the  waist.    Follow-up:Patient is to return to the clinic in 1-2 weeks for follow-up but should call [facility] immediately if any signs of infection, such as fever, chills, sweats, increased redness or pain.    If wound does not improve he may require biopsy given his history of multiple skin cancers.  Discussed this with patient and he agrees.    Short-term goals include maintaining good offloading and minimizing bioburden, promoting granulation and epithelialization to healing.  Long-term goals include keeping the wound healed by good offloading and medical management under the direction of internist.       Alert and oriented to person, place, time/situation. normal mood and affect. no apparent risk to self or others.

## 2020-01-16 NOTE — PROGRESS NOTES
Digital Medicine: Health  Follow-Up    Patient is doing good at this time and does not have any questions concerning BP readings, medication or lifestyle routine. Patient states he has been busy and forgets to take readings but agreed to submit one today.     The history is provided by the patient.       INTERVENTION(S)  encouragement/support, denied further coaching, denied resources, denied support and denied questions    PLAN  patient verbalizes understanding and continue monitoring          Topic    Lipid (Cholesterol) Test        Last 5 Patient Entered Readings                                      Current 30 Day Average: 122/66     Recent Readings 1/6/2020 12/17/2019 11/17/2019 10/30/2019 10/30/2019    SBP (mmHg) 119 124 138 124 134    DBP (mmHg) 65 67 68 74 68    Pulse 81 85 75 85 83            Diet Screening   No change to diet.      Physical Activity Screening   No change to exercise routine.

## 2020-01-17 ENCOUNTER — OFFICE VISIT (OUTPATIENT)
Dept: OPHTHALMOLOGY | Facility: CLINIC | Age: 75
End: 2020-01-17
Payer: MEDICARE

## 2020-01-17 DIAGNOSIS — H26.493 POSTERIOR CAPSULAR OPACIFICATION, BILATERAL: Primary | ICD-10-CM

## 2020-01-17 DIAGNOSIS — B00.52 HERPES SIMPLEX VIRUS STROMAL KERATITIS: ICD-10-CM

## 2020-01-17 PROCEDURE — 92014 PR EYE EXAM, EST PATIENT,COMPREHESV: ICD-10-PCS | Mod: 57,HCNC,S$GLB, | Performed by: OPHTHALMOLOGY

## 2020-01-17 PROCEDURE — 99999 PR PBB SHADOW E&M-EST. PATIENT-LVL III: CPT | Mod: PBBFAC,HCNC,, | Performed by: OPHTHALMOLOGY

## 2020-01-17 PROCEDURE — 66821 AFTER CATARACT LASER SURGERY: CPT | Mod: 50,HCNC,S$GLB, | Performed by: OPHTHALMOLOGY

## 2020-01-17 PROCEDURE — 92014 COMPRE OPH EXAM EST PT 1/>: CPT | Mod: 57,HCNC,S$GLB, | Performed by: OPHTHALMOLOGY

## 2020-01-17 PROCEDURE — 66821 PR DISCISSION,2ND CATARACT,LASER: ICD-10-PCS | Mod: 50,HCNC,S$GLB, | Performed by: OPHTHALMOLOGY

## 2020-01-17 PROCEDURE — 99999 PR PBB SHADOW E&M-EST. PATIENT-LVL III: ICD-10-PCS | Mod: PBBFAC,HCNC,, | Performed by: OPHTHALMOLOGY

## 2020-01-17 NOTE — PROGRESS NOTES
HPI     73 YO male presents today for a HSV F/U/ He states that his vision seems   to be decreasing OD>OS notes may need some glasses to drive.     Acyclovir PO BID   Systane OU PRN     Last edited by Jo-Ann Castillo, PCT on 1/17/2020 11:19 AM. (History)            Assessment /Plan     For exam results, see Encounter Report.    Posterior capsular opacification, bilateral    Herpes simplex virus stromal keratitis      Visually significant posterior capsular opacity present.  Discussed risks, benefits, and alternatives to laser surgery.  YAG laser capsulotomy Procedure Note:   Informed consent obtained and correct eye(s) verified with patient.  1 drop of topical Proparacaine and Iopidine instilled, and eye(s) dilated with 1% Tropicamide 2.5% Phenylephrine.  YAG laser applied to posterior capsule in cruciate pattern OU  Patient tolerated procedure well. No complications. Follow up in 1 month/PRN.    HSV quiet for few yearsn ow

## 2020-01-20 ENCOUNTER — PATIENT OUTREACH (OUTPATIENT)
Dept: ADMINISTRATIVE | Facility: OTHER | Age: 75
End: 2020-01-20

## 2020-01-20 NOTE — PROGRESS NOTES
LINKS immunization registry, Care Everywhere and Health Maintenance updated.  Chart reviewed for overdue Proactive [facility] Encounters health maintenance testing.  Patient has open case request for colonoscopy.  FItkit order not entered

## 2020-01-21 ENCOUNTER — OFFICE VISIT (OUTPATIENT)
Dept: PODIATRY | Facility: CLINIC | Age: 75
End: 2020-01-21
Payer: MEDICARE

## 2020-01-21 VITALS
DIASTOLIC BLOOD PRESSURE: 60 MMHG | HEART RATE: 73 BPM | WEIGHT: 315 LBS | BODY MASS INDEX: 41.75 KG/M2 | HEIGHT: 73 IN | SYSTOLIC BLOOD PRESSURE: 91 MMHG

## 2020-01-21 DIAGNOSIS — I83.892 VENOUS STASIS ULCER OF LEFT LOWER LEG WITH EDEMA OF LEFT LOWER LEG: ICD-10-CM

## 2020-01-21 DIAGNOSIS — I83.029 VENOUS STASIS ULCER OF LEFT LOWER LEG WITH EDEMA OF LEFT LOWER LEG: ICD-10-CM

## 2020-01-21 DIAGNOSIS — I73.9 PVD (PERIPHERAL VASCULAR DISEASE): Primary | ICD-10-CM

## 2020-01-21 DIAGNOSIS — R60.0 VENOUS STASIS ULCER OF LEFT LOWER LEG WITH EDEMA OF LEFT LOWER LEG: ICD-10-CM

## 2020-01-21 DIAGNOSIS — L97.929 VENOUS STASIS ULCER OF LEFT LOWER LEG WITH EDEMA OF LEFT LOWER LEG: ICD-10-CM

## 2020-01-21 PROCEDURE — 1159F MED LIST DOCD IN RCRD: CPT | Mod: HCNC,S$GLB,, | Performed by: PODIATRIST

## 2020-01-21 PROCEDURE — 1159F PR MEDICATION LIST DOCUMENTED IN MEDICAL RECORD: ICD-10-PCS | Mod: HCNC,S$GLB,, | Performed by: PODIATRIST

## 2020-01-21 PROCEDURE — 3078F PR MOST RECENT DIASTOLIC BLOOD PRESSURE < 80 MM HG: ICD-10-PCS | Mod: HCNC,CPTII,S$GLB, | Performed by: PODIATRIST

## 2020-01-21 PROCEDURE — 1101F PT FALLS ASSESS-DOCD LE1/YR: CPT | Mod: HCNC,CPTII,S$GLB, | Performed by: PODIATRIST

## 2020-01-21 PROCEDURE — 99213 OFFICE O/P EST LOW 20 MIN: CPT | Mod: HCNC,S$GLB,, | Performed by: PODIATRIST

## 2020-01-21 PROCEDURE — 3074F SYST BP LT 130 MM HG: CPT | Mod: HCNC,CPTII,S$GLB, | Performed by: PODIATRIST

## 2020-01-21 PROCEDURE — 1126F AMNT PAIN NOTED NONE PRSNT: CPT | Mod: HCNC,S$GLB,, | Performed by: PODIATRIST

## 2020-01-21 PROCEDURE — 99213 PR OFFICE/OUTPT VISIT, EST, LEVL III, 20-29 MIN: ICD-10-PCS | Mod: HCNC,S$GLB,, | Performed by: PODIATRIST

## 2020-01-21 PROCEDURE — 3078F DIAST BP <80 MM HG: CPT | Mod: HCNC,CPTII,S$GLB, | Performed by: PODIATRIST

## 2020-01-21 PROCEDURE — 99999 PR PBB SHADOW E&M-EST. PATIENT-LVL III: ICD-10-PCS | Mod: PBBFAC,HCNC,, | Performed by: PODIATRIST

## 2020-01-21 PROCEDURE — 3074F PR MOST RECENT SYSTOLIC BLOOD PRESSURE < 130 MM HG: ICD-10-PCS | Mod: HCNC,CPTII,S$GLB, | Performed by: PODIATRIST

## 2020-01-21 PROCEDURE — 1126F PR PAIN SEVERITY QUANTIFIED, NO PAIN PRESENT: ICD-10-PCS | Mod: HCNC,S$GLB,, | Performed by: PODIATRIST

## 2020-01-21 PROCEDURE — 1101F PR PT FALLS ASSESS DOC 0-1 FALLS W/OUT INJ PAST YR: ICD-10-PCS | Mod: HCNC,CPTII,S$GLB, | Performed by: PODIATRIST

## 2020-01-21 PROCEDURE — 99999 PR PBB SHADOW E&M-EST. PATIENT-LVL III: CPT | Mod: PBBFAC,HCNC,, | Performed by: PODIATRIST

## 2020-01-21 NOTE — PATIENT INSTRUCTIONS
Wound care Instructions:   · Once a day beginning in 48 hours::   ¨ Clean the leg separate from your body with warm running water and antibacterial soap such as dial  ¨ Clean any remaining crust away with soap and water using a cotton-tipped applicator.  ¨ DRY COMPLETELY  ¨ Apply betadineand sugar paste to the wound bed  ¨ Cover with a breathable bandage until there is no more drainage or open flesh.  · Change the dressing daily, or whenever it becomes wet or dirty.        When to seek medical advice  Call your health care provider right away if any of the following occur:  · Increasing redness, pain or swelling of the toe  · Red streaks in the skin leading away from the wound  · Continued pus or fluid drainage for more than 24 hours  · Fever of 100.4º F (38º C) or higher, or as directed by your health care provider        Recommend lotions: eucerin, eucerin for diabetics, aquaphor, A&D ointment, gold bond for diabetics, sween, Herington's Bees all purpose baby ointment,  urea 40 with aloe (found on amazon.com)    Shoe recommendations: (try 6pm.Merus, zappos.Merus , Admittedly, or shoes.Merus for discounted prices) you can visit [employer] shoes in [place]  or [facility] the [facility] (there are also several shoe brand outlets in the [facility])    Asics (GT 2000 or gel foundations), new balance stability type shoes (such as the 940 series), saucony (stabil c3),  Knight (GTS or Beast or transcend), propet (tennis shoe)    Trang Santiago (women) Joaquin&Ike (men), clarks, crocs, aerosoles, naturalizers, SAS, ecco, born, sindy franki, rockelodia (dress shoes)    Vionic, burkenstocks, fitflops, propet (sandals)  Nike comfort thong sandals, crocs, propet (house shoes)    Nail Home remedy:  Vicks Vapor rub to nails for easier manageability    Athletes Foot     Athletes Foot is caused by a fungal infection in the skin. It affects the skin between the toes where it causes fissures (cracks in the skin). It can also affect the  bottom of the foot where it causes dry white scales and peeling of the skin. This infection is more likely to occur when the foot is in hot, sweaty socks and shoes for long periods of time.   This infection is treated with skin creams or oral medicine.     Home Care:   It is important to keep the feet dry. Use absorbent cotton socks and change them if they become sweaty; or wear an open-toe shoe or sandal. Wash the feet at least once a day with soap and water.   Rotate your shoes. If you must wear the same shoes everyday then spray the shoes with lysol or antifungal spray and allow that to dry overnight before wearing the shoes again  Apply the antifungal cream as prescribed. Some antifungal creams are available without a prescription (Lotrimin, Tinactin).   It may take a week before the rash starts to improve and it can take about three to four weeks to completely clear. Continue the medicine until the rash is all gone.   Use over-the-counter antifungal powders or sprays on your feet after exposure to high-risk environments (public showers, gyms and locker rooms) may prevent future infections. You may wish to use appropriate footwear to reduce exposure.  Clean tubs and bathroom floor with bleach  Clean feet with Nizoral shampoo or dial antibacterial soap and then dry completely.    Follow Up   with your doctor as recommended by our staff if the rash is not starting to improve after TEN days of treatment, or if the rash continues to spread.     Get Prompt Medical Attention   if any of the following occur:   Increasing redness or swelling of the foot   Pus draining from cracks in the skin   Fever of 100.4ºF (38ºC) or higher, or as directed by your healthcare provider    © 6479-4834 Disha Babin, [address] [place] [address], [place] [address]. All rights reserved. This information is not intended as a substitute for professional medical care. Always follow your healthcare professional's instructions.

## 2020-01-23 ENCOUNTER — OFFICE VISIT (OUTPATIENT)
Dept: CARDIOLOGY | Facility: CLINIC | Age: 75
End: 2020-01-23
Payer: MEDICARE

## 2020-01-23 VITALS
DIASTOLIC BLOOD PRESSURE: 58 MMHG | SYSTOLIC BLOOD PRESSURE: 126 MMHG | HEIGHT: 73 IN | BODY MASS INDEX: 41.75 KG/M2 | WEIGHT: 315 LBS | OXYGEN SATURATION: 95 % | HEART RATE: 81 BPM

## 2020-01-23 DIAGNOSIS — I10 ESSENTIAL HYPERTENSION: ICD-10-CM

## 2020-01-23 DIAGNOSIS — I48.0 PAROXYSMAL ATRIAL FIBRILLATION: ICD-10-CM

## 2020-01-23 DIAGNOSIS — R07.89 CHEST PAIN, ATYPICAL: Primary | ICD-10-CM

## 2020-01-23 DIAGNOSIS — I25.10 CORONARY ARTERY DISEASE INVOLVING NATIVE CORONARY ARTERY OF NATIVE HEART WITHOUT ANGINA PECTORIS: ICD-10-CM

## 2020-01-23 DIAGNOSIS — E78.00 PURE HYPERCHOLESTEROLEMIA: ICD-10-CM

## 2020-01-23 PROCEDURE — 99999 PR PBB SHADOW E&M-EST. PATIENT-LVL V: CPT | Mod: PBBFAC,HCNC,, | Performed by: INTERNAL MEDICINE

## 2020-01-23 PROCEDURE — 3078F DIAST BP <80 MM HG: CPT | Mod: HCNC,CPTII,S$GLB, | Performed by: INTERNAL MEDICINE

## 2020-01-23 PROCEDURE — 1159F PR MEDICATION LIST DOCUMENTED IN MEDICAL RECORD: ICD-10-PCS | Mod: HCNC,S$GLB,, | Performed by: INTERNAL MEDICINE

## 2020-01-23 PROCEDURE — 1101F PR PT FALLS ASSESS DOC 0-1 FALLS W/OUT INJ PAST YR: ICD-10-PCS | Mod: HCNC,CPTII,S$GLB, | Performed by: INTERNAL MEDICINE

## 2020-01-23 PROCEDURE — 3078F PR MOST RECENT DIASTOLIC BLOOD PRESSURE < 80 MM HG: ICD-10-PCS | Mod: HCNC,CPTII,S$GLB, | Performed by: INTERNAL MEDICINE

## 2020-01-23 PROCEDURE — 99499 RISK ADDL DX/OHS AUDIT: ICD-10-PCS | Mod: HCNC,S$GLB,, | Performed by: INTERNAL MEDICINE

## 2020-01-23 PROCEDURE — 1126F AMNT PAIN NOTED NONE PRSNT: CPT | Mod: HCNC,S$GLB,, | Performed by: INTERNAL MEDICINE

## 2020-01-23 PROCEDURE — 1159F MED LIST DOCD IN RCRD: CPT | Mod: HCNC,S$GLB,, | Performed by: INTERNAL MEDICINE

## 2020-01-23 PROCEDURE — 1126F PR PAIN SEVERITY QUANTIFIED, NO PAIN PRESENT: ICD-10-PCS | Mod: HCNC,S$GLB,, | Performed by: INTERNAL MEDICINE

## 2020-01-23 PROCEDURE — 99999 PR PBB SHADOW E&M-EST. PATIENT-LVL V: ICD-10-PCS | Mod: PBBFAC,HCNC,, | Performed by: INTERNAL MEDICINE

## 2020-01-23 PROCEDURE — 3074F PR MOST RECENT SYSTOLIC BLOOD PRESSURE < 130 MM HG: ICD-10-PCS | Mod: HCNC,CPTII,S$GLB, | Performed by: INTERNAL MEDICINE

## 2020-01-23 PROCEDURE — 99499 UNLISTED E&M SERVICE: CPT | Mod: HCNC,S$GLB,, | Performed by: INTERNAL MEDICINE

## 2020-01-23 PROCEDURE — 1101F PT FALLS ASSESS-DOCD LE1/YR: CPT | Mod: HCNC,CPTII,S$GLB, | Performed by: INTERNAL MEDICINE

## 2020-01-23 PROCEDURE — 99214 PR OFFICE/OUTPT VISIT, EST, LEVL IV, 30-39 MIN: ICD-10-PCS | Mod: HCNC,S$GLB,, | Performed by: INTERNAL MEDICINE

## 2020-01-23 PROCEDURE — 3074F SYST BP LT 130 MM HG: CPT | Mod: HCNC,CPTII,S$GLB, | Performed by: INTERNAL MEDICINE

## 2020-01-23 PROCEDURE — 99214 OFFICE O/P EST MOD 30 MIN: CPT | Mod: HCNC,S$GLB,, | Performed by: INTERNAL MEDICINE

## 2020-01-23 RX ORDER — SODIUM CHLORIDE 9 MG/ML
INJECTION, SOLUTION INTRAVENOUS CONTINUOUS
Status: CANCELLED | OUTPATIENT
Start: 2020-01-23

## 2020-01-23 RX ORDER — METOPROLOL SUCCINATE 25 MG/1
25 TABLET, EXTENDED RELEASE ORAL DAILY
Qty: 30 TABLET | Refills: 11 | Status: SHIPPED | OUTPATIENT
Start: 2020-01-23 | End: 2020-02-07 | Stop reason: CLARIF

## 2020-01-23 RX ORDER — CLOPIDOGREL BISULFATE 75 MG/1
75 TABLET ORAL DAILY
Qty: 38 TABLET | Refills: 11 | Status: SHIPPED | OUTPATIENT
Start: 2020-01-23 | End: 2020-03-20 | Stop reason: SDUPTHER

## 2020-01-23 NOTE — PROGRESS NOTES
Subjective:    Patient ID:  Janusz Montgomery Jr. is a 75 y.o. male who presents for follow-up of Atrial Fibrillation      HPI     CAD - POLO to RCA > 20 years ago, PAF on eliquis ,Medtronic ILR, obesity, HTN, HLD     Previosly followed by Dr Smith  Here follow-up of CAD and paroxysmal atrial fibrillation.  He did follow-up with EP at [facility] and his been started on blood thinners.  He still has mainly issues with shortness of breath and little bit of swelling from time to time.  He takes Lasix as needed.  He denies any PND or orthopnea.  He did follow-up is now [employer] with CPAP.  He's not expressing dizziness, presyncope or syncope.  We discussed again sodium intake which is a fan of the [employer] buffet.  We also discussed exercise as tolerated.     Here for follow-up of CAD and paroxysmal atrial fibrillation.  Had no worsening cardiopulmonary complaints.  He says slowly able to go up a flight of stairs without any issues.  He denies any chest pain but does get shortness of breath on heavier activity.  He's had no sustained tachycardia or palpitations.  Recent loop recorder transmission showed only sinus rhythm.  She denies any PND, orthopnea or lower edema.  He's not expressing dizziness, presyncope or syncope.  He does need cardiac clearance for prostate seeding with diagnosed cancer.     Lower extremity arterial ultrasound: 2016  No appreciable focal stenosis; however, ankle-brachial index on the left is abnormal.     Echo: 7/23/19  · Normal left ventricular systolic function. The estimated ejection fraction is 60%  · No wall motion abnormalities.  · Moderate eccentric left ventricular hypertrophy.  · Mild left ventricular enlargement.  · Normal right ventricular systolic function.  · The sinuses of Valsalva is mildly dilated. 3.8cm.  · Mild aortic regurgitation.  · The estimated PA systolic pressure is 33 mm Hg      PET stress 11/26/19    There is a very small sized, mild intensity  inferobasilar involving  5% of the LV myocardium.  After pharmacologic stress this defect improves indicative of non-transmural scar.    There is a very small (<5%) sized, mild intensity  inferoapical stress induced perfusion abnormality involving 2.0% of LV myocardium in the distribution of the distal PDA territory.    Whole heart absolute myocardial perfusion (cc/min/g) averaged 1.03 cc/min/g at rest (which is elevated), 1.25 cc/min/g at stress (which is mildly reduced), and 1.24 CFR (which is severely reduced in part due to elevated resting flow).    Gated perfusion images showed an ejection fraction of 65% at rest and 83% during stress.    Normal ejection fraction is greater than 51%.    Wall motion was normal at rest and during stress.    LV cavity size is normal at rest and stress.    The EKG portion of this study is negative for ischemia.    There were no arrhythmias during stress.    The patient reported no chest pain during the stress test.    When compared to the prior study on 9/26/2012, there is a new small inferobasilar fixed defect consistent with a non-transmural infarct and a small new inferoapical reversible defect. Overall whole heart absolute stress myocardial blood flow has not changed.     NST:  12-17  Impression: ABNORMAL MYOCARDIAL PERFUSION  1. There is evidence for mild myocardial ischemia in the anterior wall of the left ventricle. Specificity is reduced secondary to body habitus.   2. The perfusion scan is free of evidence for myocardial injury.   3. There is a moderate intensity fixed defect in the inferior wall of the left ventricle, secondary to diaphragm attenuation.   4. Resting wall motion is physiologic.   5. Visually estimated LV function is normal.   6. The ventricular volumes are normal at rest and stress.   7. The extracardiac distribution of radioactivity is normal.   8. When compared to the previous study from 01/14/2009, possible anterior ischemia (vs attenuation artifact) and fixed  inferior defect (suggestive of attenuation artifact) now present.  Consider PET-stress for further evaluation if clinically indicated.     LDL-84  11-17     Carotid ultrasound:  5-18  CONCLUSIONS   There is 20 - 39% right Internal Carotid stenosis.  There is 20 - 39% left Internal Carotid stenosis.     10/24/19 Reports worsening PURDY - sometimes associated with chest tightness  EKG NSR - ok     12/11/19 Continues to report PURDY despite losing 30 pounds    Discussed abnormal stress findings - only a very small region of inferoapical ischemia noted. Discussed LHC - will try adding imdur 30 qd and reassessing symptoms in 1 month    1/23/20 Just got imdur filled. Continues to hav significant PURDY    Review of Systems   Constitution: Negative for decreased appetite.   HENT: Negative for ear discharge.    Eyes: Negative for blurred vision.   Endocrine: Negative for polyphagia.   Skin: Negative for nail changes.   Genitourinary: Negative for bladder incontinence.   Neurological: Negative for aphonia.   Psychiatric/Behavioral: Negative for hallucinations.   Allergic/Immunologic: Negative for hives.        Objective:    Physical Exam   Constitutional: He is oriented to person, place, and time. He appears well-developed and well-nourished. No distress.   HENT:   Head: Normocephalic and atraumatic.   Eyes: Pupils are equal, round, and reactive to light. Conjunctivae and EOM are normal.   Neck: Normal range of motion. Neck supple. No thyromegaly present.   Cardiovascular: Normal rate, regular rhythm and normal heart sounds.   No murmur heard.  Pulmonary/Chest: Effort normal and breath sounds normal. No respiratory distress. He has no wheezes. He has no rales. He exhibits no tenderness.   Abdominal: Soft. Bowel sounds are normal.   Musculoskeletal: He exhibits no edema.   Neurological: He is alert and oriented to person, place, and time.   Skin: Skin is warm and dry.   Psychiatric: He has a normal mood and affect. His behavior is  normal.         Assessment:       1. Chest pain, atypical    2. Coronary artery disease involving native coronary artery of native heart without angina pectoris s/p RCA stent    3. Pure hypercholesterolemia    4. Essential hypertension    5. Paroxysmal atrial fibrillation          Plan:       Add toprol XL 25 qd  Discussed LHC for ongoing PURDY and abnormal PET - risks/benefits explained and he agrees to proceed  Load plavix  Hold eliquis 3 days before cath

## 2020-01-23 NOTE — H&P
[facility] - [place] - Cardiology  History & Physical    Subjective:      Chief Complaint/Reason for Admission: [facility]    Janusz Montgomery Jr. is a 75 y.o. male.    CAD - POLO to RCA > 20 years ago, PAF on eliquis ,[employer]tr[employer] ILR, obesity, HTN, HLD     Previosly followed by Dr Smith  Here follow-up of CAD and paroxysmal atrial fibrillation.  He did follow-up with EP at main [facility] and his been started on blood thinners.  He still has mainly issues with shortness of breath and little bit of swelling from time to time.  He takes Lasix as needed.  He denies any PND or orthopnea.  He did follow-up is now [employer] with CPAP.  He's not expressing dizziness, presyncope or syncope.  We discussed again sodium intake which is a fan of the [employer] buffet.  We also discussed exercise as tolerated.     Here for follow-up of CAD and paroxysmal atrial fibrillation.  Had no worsening cardiopulmonary complaints.  He says slowly able to go up a flight of stairs without any issues.  He denies any chest pain but does get shortness of breath on heavier activity.  He's had no sustained tachycardia or palpitations.  Recent loop recorder transmission showed only sinus rhythm.  She denies any PND, orthopnea or lower edema.  He's not expressing dizziness, presyncope or syncope.  He does need cardiac clearance for prostate seeding with diagnosed cancer.     Lower extremity arterial ultrasound: 2016  No appreciable focal stenosis; however, ankle-brachial index on the left is abnormal.     Echo: 7/23/19  · Normal left ventricular systolic function. The estimated ejection fraction is 60%  · No wall motion abnormalities.  · Moderate eccentric left ventricular hypertrophy.  · Mild left ventricular enlargement.  · Normal right ventricular systolic function.  · The sinuses of Valsalva is mildly dilated. 3.8cm.  · Mild aortic regurgitation.  · The estimated PA systolic pressure is 33 mm Hg      PET stress 11/26/19    There is a very small sized, mild intensity   inferobasilar involving 5% of the LV myocardium.  After pharmacologic stress this defect improves indicative of non-transmural scar.    There is a very small (<5%) sized, mild intensity  inferoapical stress induced perfusion abnormality involving 2.0% of LV myocardium in the distribution of the distal PDA territory.    Whole heart absolute myocardial perfusion (cc/min/g) averaged 1.03 cc/min/g at rest (which is elevated), 1.25 cc/min/g at stress (which is mildly reduced), and 1.24 CFR (which is severely reduced in part due to elevated resting flow).    Gated perfusion images showed an ejection fraction of 65% at rest and 83% during stress.    Normal ejection fraction is greater than 51%.    Wall motion was normal at rest and during stress.    LV cavity size is normal at rest and stress.    The EKG portion of this study is negative for ischemia.    There were no arrhythmias during stress.    The patient reported no chest pain during the stress test.    When compared to the prior study on 9/26/2012, there is a new small inferobasilar fixed defect consistent with a non-transmural infarct and a small new inferoapical reversible defect. Overall whole heart absolute stress myocardial blood flow has not changed.     NST:  12-17  Impression: ABNORMAL MYOCARDIAL PERFUSION  1. There is evidence for mild myocardial ischemia in the anterior wall of the left ventricle. Specificity is reduced secondary to body habitus.   2. The perfusion scan is free of evidence for myocardial injury.   3. There is a moderate intensity fixed defect in the inferior wall of the left ventricle, secondary to diaphragm attenuation.   4. Resting wall motion is physiologic.   5. Visually estimated LV function is normal.   6. The ventricular volumes are normal at rest and stress.   7. The extracardiac distribution of radioactivity is normal.   8. When compared to the previous study from 01/14/2009, possible anterior ischemia (vs attenuation  artifact) and fixed inferior defect (suggestive of attenuation artifact) now present.  Consider PET-stress for further evaluation if clinically indicated.     LDL-84  11-17     Carotid ultrasound:  5-18  CONCLUSIONS   There is 20 - 39% right Internal Carotid stenosis.  There is 20 - 39% left Internal Carotid stenosis.     10/24/19 Reports worsening PURDY - sometimes associated with chest tightness  EKG NSR - ok     12/11/19 Continues to report PURDY despite losing 30 pounds    Discussed abnormal stress findings - only a very small region of inferoapical ischemia noted. Discussed LHC - will try adding imdur 30 qd and reassessing symptoms in 1 month    1/23/20 Just got imdur filled. 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 pylori) infection     Treated    Herpes simplex keratoconjunctivitis 9/30/2015    - on acyclovir - followed by opthalmology, Dr. Uribe     Herpes simplex keratoconjunctivitis 9/30/2015    - on acyclovir - followed by opthalmology, Dr. Uribe     Hyperkalemia 2/28/2017    Hyperlipidemia     Hypogonadism male      "Hypogonadism male     Mixed anxiety and depressive disorder     Morbid obesity     Obstructive sleep apnea on CPAP     CPAP    Osteoarthritis of left knee 7/25/2012    Paroxysmal atrial fibrillation 7/6/2016    Primary osteoarthritis of left knee 7/25/2012    Prominent aorta 1/25/2016    "RESULTS: THE HEART IS MILDLY ENLARGED WITH A SLIGHTLY PROMINENT AORTA" - Xray Chest PA & Lateral 12-     Prostate cancer 2/15/2016    - followed by urology, Dr. Young     Prostate cancer 2/15/2016    - followed by urology, Dr. 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 Conteh    CATARACT EXTRACTION W/  INTRAOCULAR LENS IMPLANT Left 4/12/2016        EXTERNAL FIXATION TIBIAL FRACTURE Left 07/01/2016    INSERTION OF IMPLANTABLE LOOP RECORDER  04/07/2017    ORIF TIBIA FRACTURE Left 07/15/2016    RADIOACTIVE SEED IMPLANTATION OF PROSTATE N/A 8/8/2018    Procedure: INSERTION, RADIOACTIVE SEED, PROSTATE;  Surgeon: Bipin Thompson MD;  Location: [facility] OR [address] [place] [address];  Service: Urology;  Laterality: N/A;  1 hour    Squamous cell cancer removal x3 with Mohs surgery      TONSILLECTOMY      TOTAL KNEE ARTHROPLASTY  10/2012    trus/bx       Family History   Problem Relation Age of Onset    Skin cancer Father     Lung cancer Father     Cancer Father         smoker,     Alzheimer's disease Mother     Hypertension Mother     Cancer Sister "         colon, lung cancer     No Known Problems Sister     Cancer Brother         skin cancer, polypectomy     Peripheral vascular disease Unknown     No Known Problems Maternal Aunt     No Known Problems Maternal Uncle     No Known Problems Paternal Aunt     No Known Problems Paternal Uncle     No Known Problems Maternal Grandmother     No Known Problems Maternal Grandfather     No Known Problems Paternal Grandmother     No Known Problems Paternal Grandfather     Melanoma Neg Hx     Psoriasis Neg Hx     Lupus Neg Hx     Eczema Neg Hx     Amblyopia Neg Hx     Blindness Neg Hx     Cataracts Neg Hx     Diabetes Neg Hx     Glaucoma Neg Hx     Macular degeneration Neg Hx     Retinal detachment Neg Hx     Strabismus Neg Hx     Stroke Neg Hx     Thyroid disease Neg Hx     Acne Neg Hx      Social History     Tobacco Use    Smoking status: Never Smoker    Smokeless tobacco: Never Used   Substance Use Topics    Alcohol use: Yes     Frequency: 2-4 times a month     Drinks per session: 3 or 4     Binge frequency: Less than monthly     Comment: occasionally, beer    Drug use: No         (Not in a hospital admission)  Review of patient's allergies indicates:   Allergen Reactions    Ciprofloxacin Rash     Diffuse pruritic morbilliform rash developed 3/15/2017 after dose of cipro; previously in 2/2017 he had rash/fevers after initiation of cipro    Zosyn [piperacillin-tazobactam] Rash     Diffuse pruritic morbilliform rash developed 3/15/2017.  Then, 430am dose on 3/16 and rash worsened with SOB/tachypnea but no hypoxemia.     Bacitracin Itching and Rash     Violaceous rash in area of topical Tx.         Review of Systems   All other systems reviewed and are negative.      Objective:      Vital Signs (Most Recent)  Pulse: 81 (01/23/20 1319)  BP: (!) 126/58 (01/23/20 1319)  SpO2: 95 % (01/23/20 1319)    Vital Signs Range (Last 24H):  [unfilled]    Physical Exam   Constitutional: He is oriented  to person, place, and time. He appears well-developed and well-nourished.   HENT:   Head: Normocephalic and atraumatic.   Eyes: Pupils are equal, round, and reactive to light. EOM are normal.   Neck: Normal range of motion. Neck supple.   Cardiovascular: Normal rate and regular rhythm.   Pulmonary/Chest: Effort normal and breath sounds normal.   Abdominal: Soft. Bowel sounds are normal.   Musculoskeletal: Normal range of motion.   Neurological: He is alert and oriented to person, place, and time.   Skin: Skin is warm and dry.       Data Review:    CBC:   Lab Results   Component Value Date    WBC 12.14 09/26/2018    RBC 3.79 (L) 09/26/2018    HGB 12.7 (L) 09/26/2018    HCT 38.9 (L) 09/26/2018     09/26/2018     BMP:   Lab Results   Component Value Date     07/26/2018     07/26/2018    K 4.5 07/26/2018     07/26/2018    CO2 25 07/26/2018    BUN 16 07/26/2018    CREATININE 1.2 07/26/2018    CALCIUM 9.3 07/26/2018      ECG: NSR NSSTT changes.     Assessment:      There are no hospital problems to display for this patient.    PURDY - abnormal PET  On ASA/plavix and 2 anti anginal medications    Plan:      Hold eliquis 3 days before cath

## 2020-01-27 NOTE — PROGRESS NOTES
Subjective:      Patient ID: Janusz Montgomery Jr. is a 75 y.o. male.    Chief Complaint: Peripheral Vascular Disease (ov 6/26/19 [facility]as[facility] pcp) and Wound Care (left lower leg- dry skin to heel)      Janusz Montgomery Jr. is a 75 y.o. malewho presents to the clinic for evaluation and treatment of high risk feet. Janusz Montgomery Jr.   has a past medical history of NAT (acute kidney injury) (3/19/2017), ALLERGIC RHINITIS, Anemia, Anxiety, Basal cell carcinoma (10/19/2018), Basal cell carcinoma (01/09/2019), Chronic rhinitis (5/3/2013), Chronic rhinitis (5/3/2013), Coronary artery disease involving native coronary artery of native heart without angina pectoris s/p RCA stent, Cortical cataract of both eyes (3/18/2016), Delayed sleep phase syndrome (3/13/2019), Depression, Erectile dysfunction (3/24/2014), Erectile dysfunction (3/24/2014), Essential hypertension, GERD (gastroesophageal reflux disease) (7/25/2012), Gout, arthritis, Grade III open fracture of left tibia and fibula s/p ex-fix on 7/1/16 and ORIF of left tibia on 7/15 (7/6/2016), H/O: iritis, Helicobacter pylori (H. 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  The patient's chief complaint is an anterior left leg wound.  One month duration.  Secondary to mild trauma in the presence of edema.  Has attempted self treatment but wound has not healed.  Non painful. Denies nausea, vomiting, fever, chills. History of left foot and ankle ulcers.  This patient has documented high risk feet requiring routine maintenance secondary to peripheral vascular disease.    12/10/2019  Patient relates he has attempted to care for leg ulcer as instructed but wound dimensions have not improved.    12/19/2019 patient relates that he has kept dressing clean dry and intact without itching or discomfort.  He has begun taking antibiotics as prescribed without nausea, vomiting, diarrhea, fever, chills.  No new pedal complaints.    12/31/19: F/u left leg ulceration. Has been in unna boot without issue.     01/07/2020 Patient relates that he has kept dressing clean dry and intact without itching or discomfort. He relates increased callus to posterior heel.  He denies nausea, vomiting, diarrhea, fever, chills.  No new pedal complaints.    01/14/2020  Patient relates that he has kept dressing clean dry and intact without itching however he relates that dressing was slipping and therefore somewhat uncomfortable this week.   He denies nausea, vomiting, diarrhea, fever, chills.  No new pedal complaints.    01/21/2020 patient relates that he has kept dressing clean, dry, intact.  He has no pedal complaints.  He does eyes nausea, vomiting, fever, chills.  He is anxious to get out of Unna boot.      PCP: Miguelina Weathers MD    Date Last Seen by PCP:   Chief Complaint   Patient presents with    Peripheral Vascular Disease     ov 6/26/19 Jasiel pcp    Wound Care     left lower leg- dry skin to heel     Current shoe gear: tennis shoe    Hemoglobin A1C   Date Value Ref Range Status   07/11/2016 5.3 4.5 - 6.2 % Final     Comment:     According to ADA  guidelines, hemoglobin A1C <7.0% represents  optimal control in non-pregnant diabetic patients.  Different  metrics may apply to specific populations.   Standards of Medical Care in Diabetes - 2016.  For the purpose of screening for the presence of diabetes:  <5.7%     Consistent with the absence of diabetes  5.7-6.4%  Consistent with increasing risk for diabetes   (prediabetes)  >or=6.5%  Consistent with diabetes  Currently no consensus exists for use of hemoglobin A1C  for diagnosis of diabetes for children.     03/31/2015 5.3 4.5 - 6.2 % Final   03/03/2014 5.6 4.5 - 6.2 % Final         Current Outpatient Medications on File Prior to Visit   Medication Sig Dispense Refill    acyclovir (ZOVIRAX) 800 MG Tab Take 1 tablet (800 mg total) by mouth 2 (two) times daily. 180 tablet 6    apixaban (ELIQUIS) 5 mg Tab Take 1 tablet (5 mg total) by mouth 2 (two) times daily. 180 tablet 4    artificial tears (ISOPTO TEARS) 0.5 % ophthalmic solution Place 1 drop into both eyes as needed. (Patient taking differently: Place 1 drop into both eyes as needed (for dry eyes). )      atorvastatin (LIPITOR) 40 MG tablet Take 1 tablet (40 mg total) by mouth once daily. Please make a follow up visit with your doctor for more refills of this medication 90 tablet 0    azelastine (ASTELIN) 137 mcg (0.1 %) nasal spray 1 spray (137 mcg total) by Nasal route 2 (two) times daily. 30 mL 2    azithromycin (Z-JESSICA) 250 MG tablet Take 2 tablets by mouth on day 1; Take 1 tablet by mouth on days 2-5 (Patient not taking: Reported on 1/23/2020) 6 tablet 0    CYANOCOBALAMIN, VITAMIN B-12, (VITAMIN B-12 ORAL) Take 2,500 mcg by mouth once daily.      erythromycin (ROMYCIN) ophthalmic ointment Place into both eyes 3 (three) times daily. (Patient not taking: Reported on 1/23/2020) 1 g 1    fluticasone propionate (FLONASE) 50 mcg/actuation nasal spray 1 spray (50 mcg total) by Each Nostril route once daily. 1 Bottle 0    isosorbide mononitrate (IMDUR)  30 MG 24 hr tablet Take 1 tablet (30 mg total) by mouth once daily. (Patient not taking: Reported on 1/23/2020) 30 tablet 11    melatonin 5 mg Tab Take 10 mg by mouth nightly.      multivitamin (MULTIPLE VITAMINS) per tablet Take 1 tablet by mouth once daily.      nitroGLYCERIN (NITROSTAT) 0.4 MG SL tablet Place 1 tablet (0.4 mg total) under the tongue every 5 (five) minutes as needed for Chest pain. 25 tablet 11    olmesartan (BENICAR) 20 MG tablet Take 1 tablet (20 mg total) by mouth once daily. (replaces ramipril for blood pressure) 90 tablet 1    oxyCODONE-acetaminophen (PERCOCET) 5-325 mg per tablet Take 1 tablet by mouth every 4 to 6 hours as needed for Pain. 20 tablet 0    tamsulosin (FLOMAX) 0.4 mg Cap Take 1 capsule (0.4 mg total) by mouth once daily. 90 capsule 3    venlafaxine (EFFEXOR) 75 MG tablet Take 2 tablets (150 mg total) by mouth 2 (two) times daily. 360 tablet 0    vitamin D 1000 units Tab Take 1 tablet (1,000 Units total) by mouth once daily.      albuterol (PROVENTIL/VENTOLIN HFA) 90 mcg/actuation inhaler Inhale 2 puffs into the lungs every 6 (six) hours as needed for Wheezing. Rescue 18 g 0    aspirin (ECOTRIN) 81 MG EC tablet Take 1 tablet (81 mg total) by mouth once daily.  0    furosemide (LASIX) 20 MG tablet Take 1 tablet (20 mg total) by mouth once daily. (Patient taking differently: Take 20 mg by mouth daily as needed. ) 90 tablet 0    gabapentin (NEURONTIN) 300 MG capsule Take 1 capsule (300 mg total) by mouth 2 (two) times daily. (Patient taking differently: Take 300 mg by mouth 2 (two) times daily as needed. ) 60 capsule 11    omeprazole (PRILOSEC) 20 MG capsule Take 1 capsule (20 mg total) by mouth daily as needed. 90 capsule 0     No current facility-administered medications on file prior to visit.        Allergies   Allergen Reactions    Ciprofloxacin Rash     Diffuse pruritic morbilliform rash developed 3/15/2017 after dose of cipro; previously in 2/2017 he had  rash/fevers after initiation of cipro    Zosyn [Piperacillin-Tazobactam] Anaphylaxis     Diffuse pruritic morbilliform rash developed 3/15/2017.  Then, 430am dose on 3/16 and rash worsened with SOB/tachypnea but no hypoxemia.     Bacitracin Itching and Rash     Violaceous rash in area of topical Tx.        Past Surgical History:   Procedure Laterality Date    Cardiac stenting x2      CATARACT EXTRACTION W/  INTRAOCULAR LENS IMPLANT Right 3/29/2016    Dr. Conteh    CATARACT EXTRACTION W/  INTRAOCULAR LENS IMPLANT Left 4/12/2016        EXTERNAL FIXATION TIBIAL FRACTURE Left 07/01/2016    INSERTION OF IMPLANTABLE LOOP RECORDER  04/07/2017    ORIF TIBIA FRACTURE Left 07/15/2016    RADIOACTIVE SEED IMPLANTATION OF PROSTATE N/A 8/8/2018    Procedure: INSERTION, RADIOACTIVE SEED, PROSTATE;  Surgeon: Bipin Thompson MD;  Location[facility] OR [address] [place] [address];  Service: Urology;  Laterality: N/A;  1 hour    Squamous cell cancer removal x3 with Mohs surgery      TONSILLECTOMY      TOTAL KNEE ARTHROPLASTY  10/2012    trus/bx         Family History   Problem Relation Age of Onset    Skin cancer Father     Lung cancer Father     Cancer Father         smoker,     Alzheimer's disease Mother     Hypertension Mother     Cancer Sister         colon, lung cancer     No Known Problems Sister     Cancer Brother         skin cancer, polypectomy     Peripheral vascular disease Unknown     No Known Problems Maternal Aunt     No Known Problems Maternal Uncle     No Known Problems Paternal Aunt     No Known Problems Paternal Uncle     No Known Problems Maternal Grandmother     No Known Problems Maternal Grandfather     No Known Problems Paternal Grandmother     No Known Problems Paternal Grandfather     Melanoma Neg Hx     Psoriasis Neg Hx     Lupus Neg Hx     Eczema Neg Hx     Amblyopia Neg Hx     Blindness Neg Hx     Cataracts Neg Hx     Diabetes Neg Hx     Glaucoma Neg Hx     Macular  degeneration Neg Hx     Retinal detachment Neg Hx     Strabismus Neg Hx     Stroke Neg Hx     Thyroid disease Neg Hx     Acne Neg Hx        Social History     Socioeconomic History    Marital status:      Spouse name: Not on file    Number of children: Not on file    Years of education: Not on file    Highest education level: Not on file   Occupational History    Occupation: Retired    Social Needs    Financial resource strain: Not hard at all    Food insecurity:     Worry: Never true     Inability: Never true    Transportation needs:     Medical: No     Non-medical: No   Tobacco Use    Smoking status: Never Smoker    Smokeless tobacco: Never Used   Substance and Sexual Activity    Alcohol use: Yes     Frequency: 2-4 times a month     Drinks per session: 3 or 4     Binge frequency: Less than monthly     Comment: occasionally, beer    Drug use: No    Sexual activity: Yes   Lifestyle    Physical activity:     Days per week: Not on file     Minutes per session: Not on file    Stress: Not on file   Relationships    Social connections:     Talks on phone: Three times a week     Gets together: Once a week     Attends [employer] service: More than 4 times per year     Active member of club or organization: Yes     Attends meetings of clubs or organizations: More than 4 times per year     Relationship status:    Other Topics Concern    Not on file   Social History Narrative    Not on file       Review of Systems   Constitution: Negative for chills and fever.   Cardiovascular: Negative for claudication and leg swelling.   Respiratory: Negative for cough and shortness of breath.    Skin: Positive for dry skin and poor wound healing. Negative for itching and rash.   Musculoskeletal: Positive for arthritis, joint pain, joint swelling and myalgias. Negative for falls and muscle weakness.   Gastrointestinal: Negative for diarrhea, nausea and vomiting.   Neurological: Positive for paresthesias.  "Negative for numbness, tremors and weakness.   Psychiatric/Behavioral: Negative for altered mental status and hallucinations.         Objective:       Vitals:    01/21/20 1619   BP: 91/60   Pulse: 73   Weight: (!) 152.9 kg (337 lb)   Height: 6' 1" (1.854 m)   PainSc: 0-No pain       Physical Exam   Constitutional:  Non-toxic appearance. He does not have a sickly appearance. No distress.   Pt. is well-developed, well-nourished, appears stated age, in no acute distress, alert and oriented x 3. No evidence of depression, anxiety, or agitation. Calm, cooperative, and communicative. Appropriate interactions and affect.   Cardiovascular:   Pulses:       Dorsalis pedis pulses are 2+ on the right side, and 2+ on the left side.        Posterior tibial pulses are 1+ on the right side, and 1+ on the left side.   There is decreased digital hair. Skin is atrophic, slightly hyperpigmented, and pitting edema nora (L>>R)     Pulmonary/Chest: No respiratory distress.   Musculoskeletal:        Right ankle: He exhibits swelling. No tenderness. No lateral malleolus, no medial malleolus, no AITFL, no CF ligament, no head of 5th metatarsal and no proximal fibula tenderness found. Achilles tendon exhibits no pain, no defect and normal Zabala's test results.        Left ankle: He exhibits swelling. No tenderness. No lateral malleolus, no medial malleolus, no AITFL, no CF ligament and no posterior TFL tenderness found. Achilles tendon exhibits no pain, no defect and normal Zabala's test results.        Right foot: There is no tenderness and no bony tenderness.        Left foot: There is no tenderness and no bony tenderness.   Decreased stride, station of gait.  apropulsive toe off.  Increased angle and base of gait.    Patient has hammertoes of digits 2-5 bilateral partially reducible without symptom today.     There is equinus deformity bilateral. There is limitation of dorsiflexion with knees extended and with knees flexed.    Shoes " reveals lateral heel counter wear bilateral in athletic shoes.        Lymphadenopathy:   No lymphatic streaking    Negative lymphadenopathy bilateral popliteal fossa and tarsal tunnel.     Neurological:   [place] 5.07 monofilament is intact bilateral feet. Sharp/dull sensation is also intact Bilateral feet. Proprioception is grossly intact. Vibratory sensation intact (pt able to sense vibration stop within 3-5 seconds)     Skin: Skin is warm and dry. Abrasion (left anterior leg as pictured, to level of subQ. local edema.  Granular base.  no fluctuance, no crepitus, no purulence) and lesion noted. No bruising, no burn, no laceration and no rash noted. He is not diaphoretic. No cyanosis or erythema. No pallor. Nails show no clubbing.   Ulcer location: medial left ankle  Signs of infection: none  Drainage: none  Purulence: no  Crepitus/fluctuance: no  Periwound: Reddened  Base: thin epithelial itssue  Depth: skin  Probe to bone: no      Ulcer location: posterior left heel  Signs of infection:none  Drainage:  None  Periwound: intact  Base: thin epithelial with bleeding within layers     Psychiatric: His mood appears not anxious. His affect is not inappropriate. His speech is not slurred. He is not combative. He is communicative. He is attentive.   Nursing note reviewed.    01/21/20 01/14/20 01/07/20 12/31/19: Epic unable to load pic.     12/19/2019          12/10/19          11/14/19          Assessment:       Encounter Diagnoses   Name Primary?    PVD (peripheral vascular disease) Yes    Venous stasis ulcer of left lower leg with edema of left lower leg          Plan:       Janusz was seen today for peripheral vascular disease and wound care.    Diagnoses and all orders for this visit:    PVD (peripheral vascular disease)    Venous stasis ulcer of left lower leg with edema of left lower leg      I counseled the patient on his conditions, their implications and medical  management.    The site is cleansed of foreign material as much as possible. The patient is alerted to watch for any signs of infection (redness, pus, pain, increased swelling or fever) and call if such occurs.    Wound is improved, without signs of infection    Fabienne and tubigrip to LLE. Well tolerated.  Detailed home care instructions dispensed.     Patient is to elevate legs. When sleeping, place a pillow under lower extremities. When sitting, support the legs so that they are level with the waist.    Follow-up:Patient is to return to the clinic in 2-4 weeks for follow-up but should call [facility] immediately if any signs of infection, such as fever, chills, sweats, increased redness or pain.    If wound does not improve he may require biopsy given his history of multiple skin cancers.  Discussed this with patient and he agrees.    Short-term goals include maintaining good offloading and minimizing bioburden, promoting granulation and epithelialization to healing.  Long-term goals include keeping the wound healed by good offloading and medical management under the direction of internist.

## 2020-01-28 ENCOUNTER — HOSPITAL ENCOUNTER (OUTPATIENT)
Dept: PREADMISSION TESTING | Facility: HOSPITAL | Age: 75
Discharge: HOME | End: 2020-01-28
Attending: INTERNAL MEDICINE
Payer: MEDICARE

## 2020-01-28 ENCOUNTER — PATIENT OUTREACH (OUTPATIENT)
Dept: ADMINISTRATIVE | Facility: OTHER | Age: 75
End: 2020-01-28

## 2020-01-28 ENCOUNTER — PATIENT MESSAGE (OUTPATIENT)
Dept: PODIATRY | Facility: CLINIC | Age: 75
End: 2020-01-28

## 2020-01-28 VITALS
RESPIRATION RATE: 16 BRPM | BODY MASS INDEX: 41.75 KG/M2 | SYSTOLIC BLOOD PRESSURE: 102 MMHG | WEIGHT: 315 LBS | HEART RATE: 77 BPM | OXYGEN SATURATION: 96 % | DIASTOLIC BLOOD PRESSURE: 54 MMHG | HEIGHT: 73 IN | TEMPERATURE: 98 F

## 2020-01-28 DIAGNOSIS — I10 ESSENTIAL HYPERTENSION: ICD-10-CM

## 2020-01-28 DIAGNOSIS — R07.89 CHEST PAIN, ATYPICAL: ICD-10-CM

## 2020-01-28 DIAGNOSIS — E78.00 PURE HYPERCHOLESTEROLEMIA: ICD-10-CM

## 2020-01-28 DIAGNOSIS — I48.0 PAROXYSMAL ATRIAL FIBRILLATION: ICD-10-CM

## 2020-01-28 DIAGNOSIS — I25.10 CORONARY ARTERY DISEASE INVOLVING NATIVE CORONARY ARTERY OF NATIVE HEART WITHOUT ANGINA PECTORIS: ICD-10-CM

## 2020-01-28 LAB
ANION GAP SERPL CALC-SCNC: 5 MMOL/L (ref 8–16)
APTT BLDCRRT: 35.9 SEC (ref 21–32)
BUN SERPL-MCNC: 18 MG/DL (ref 8–23)
CALCIUM SERPL-MCNC: 8.6 MG/DL (ref 8.7–10.5)
CHLORIDE SERPL-SCNC: 108 MMOL/L (ref 95–110)
CO2 SERPL-SCNC: 28 MMOL/L (ref 23–29)
CREAT SERPL-MCNC: 1.2 MG/DL (ref 0.5–1.4)
ERYTHROCYTE [DISTWIDTH] IN BLOOD BY AUTOMATED COUNT: 13.9 % (ref 11.5–14.5)
EST. GFR  (AFRICAN AMERICAN): >60 ML/MIN/1.73 M^2
EST. GFR  (NON AFRICAN AMERICAN): 60 ML/MIN/1.73 M^2
GLUCOSE SERPL-MCNC: 98 MG/DL (ref 70–110)
HCT VFR BLD AUTO: 34.5 % (ref 40–54)
HGB BLD-MCNC: 11.5 G/DL (ref 14–18)
INR PPP: 1 (ref 0.8–1.2)
MCH RBC QN AUTO: 33.5 PG (ref 27–31)
MCHC RBC AUTO-ENTMCNC: 33.3 G/DL (ref 32–36)
MCV RBC AUTO: 101 FL (ref 82–98)
PLATELET # BLD AUTO: 175 K/UL (ref 150–350)
PMV BLD AUTO: 10.6 FL (ref 9.2–12.9)
POTASSIUM SERPL-SCNC: 4.5 MMOL/L (ref 3.5–5.1)
PROTHROMBIN TIME: 10.5 SEC (ref 9–12.5)
RBC # BLD AUTO: 3.43 M/UL (ref 4.6–6.2)
SODIUM SERPL-SCNC: 141 MMOL/L (ref 136–145)
WBC # BLD AUTO: 10.17 K/UL (ref 3.9–12.7)

## 2020-01-28 PROCEDURE — 85610 PROTHROMBIN TIME: CPT | Mod: HCNC

## 2020-01-28 PROCEDURE — 85027 COMPLETE CBC AUTOMATED: CPT | Mod: HCNC

## 2020-01-28 PROCEDURE — 36415 COLL VENOUS BLD VENIPUNCTURE: CPT | Mod: HCNC

## 2020-01-28 PROCEDURE — 80048 BASIC METABOLIC PNL TOTAL CA: CPT | Mod: HCNC

## 2020-01-28 PROCEDURE — 85730 THROMBOPLASTIN TIME PARTIAL: CPT | Mod: HCNC

## 2020-01-28 NOTE — PRE ADMISSION SCREENING
"Called Dr. Sandhu/and Alyson to inform that pt has not started the Plavix load yet, and that pt has edema and a non healing area to LLE r/t accident years ago.  Pt sees wound team here at [facility]----Dr. Sandhu stated ok to proceed with procedure.    Dr. Sandhu stated for pt to start the 8 pill Plavix load today and take 1 pill daily for procedure.  + understanding by pt.    Pt states he gets a chest heaviness at times--instructed pt to seek medical care/ER/call MD if s/s of heart attack, weakness, pallor, chest pain, pt w/ + understanding.  He has NTG as well.  States "I know when to go".  "

## 2020-01-28 NOTE — DISCHARGE INSTRUCTIONS
"Your angiogram is scheduled for___Thursday, 1/30/20____________    Call [phone] between 2pm and 5pm on __Wednesday, 1/29/20__________to find out your arrival time for the day of your procedure.    Report to Same Day Surgery Unit at __________ AM on the 2nd floor of the hospital.  Use the front entrance of the hospital.  The front doors of the hospital open promptly at 5:30am.  If you need wheelchair assistance, call [phone] from your cell phone, or call "0" from the courtesy phone in the lobby.    IMPORTANT INSTRUCTIONS:  Do not eat or drink anything after midnight.  This includes water and coffee.  It is okay to brush your teeth.  Do not chew gum or have hard candy or mints.    Follow Dr. Sandhu's instructions concerning your blood thinner.    Take your morning medications as instructed with small sips of water.     Do not take any diabetic medication.  Metformin or Synjardy should be held for 2 days before the angiogram.    Shower the night before your procedure and the morning of your procedure with Hibiclens as directed.     Do not wear make-up.     You may wear deodorant, but do not wear body lotion, powder or perfume/cologne       Do not wear jewelry.     You will be asked to remove any dentures or partials for the procedure.     You do not need money or valuables.  You may bring your cell phone.     If you are going home the same day, you must arrange for someone to drive you home.     Wear comfortable, loose fitting clothes.      Call your doctor if you have sickness or fever before your angiogram.     Our number in [facility] is [phone] if you need to contact us.     If you are going home the same day, you must arrange for a family member or a friend to drive you home.  Public transportation is prohibited.  "

## 2020-01-29 ENCOUNTER — OFFICE VISIT (OUTPATIENT)
Dept: PODIATRY | Facility: CLINIC | Age: 75
End: 2020-01-29
Payer: MEDICARE

## 2020-01-29 VITALS
WEIGHT: 315 LBS | DIASTOLIC BLOOD PRESSURE: 62 MMHG | BODY MASS INDEX: 41.75 KG/M2 | SYSTOLIC BLOOD PRESSURE: 124 MMHG | HEIGHT: 73 IN

## 2020-01-29 DIAGNOSIS — R60.0 VENOUS STASIS ULCER OF LEFT LOWER LEG WITH EDEMA OF LEFT LOWER LEG: ICD-10-CM

## 2020-01-29 DIAGNOSIS — I83.892 VENOUS STASIS ULCER OF LEFT LOWER LEG WITH EDEMA OF LEFT LOWER LEG: ICD-10-CM

## 2020-01-29 DIAGNOSIS — L97.929 VENOUS STASIS ULCER OF LEFT LOWER LEG WITH EDEMA OF LEFT LOWER LEG: ICD-10-CM

## 2020-01-29 DIAGNOSIS — I83.029 VENOUS STASIS ULCER OF LEFT LOWER LEG WITH EDEMA OF LEFT LOWER LEG: ICD-10-CM

## 2020-01-29 DIAGNOSIS — I73.9 PVD (PERIPHERAL VASCULAR DISEASE): Primary | ICD-10-CM

## 2020-01-29 PROCEDURE — 1126F AMNT PAIN NOTED NONE PRSNT: CPT | Mod: HCNC,S$GLB,, | Performed by: PODIATRIST

## 2020-01-29 PROCEDURE — 1101F PT FALLS ASSESS-DOCD LE1/YR: CPT | Mod: HCNC,CPTII,S$GLB, | Performed by: PODIATRIST

## 2020-01-29 PROCEDURE — 3074F PR MOST RECENT SYSTOLIC BLOOD PRESSURE < 130 MM HG: ICD-10-PCS | Mod: HCNC,CPTII,S$GLB, | Performed by: PODIATRIST

## 2020-01-29 PROCEDURE — 1126F PR PAIN SEVERITY QUANTIFIED, NO PAIN PRESENT: ICD-10-PCS | Mod: HCNC,S$GLB,, | Performed by: PODIATRIST

## 2020-01-29 PROCEDURE — 1159F MED LIST DOCD IN RCRD: CPT | Mod: HCNC,S$GLB,, | Performed by: PODIATRIST

## 2020-01-29 PROCEDURE — 3074F SYST BP LT 130 MM HG: CPT | Mod: HCNC,CPTII,S$GLB, | Performed by: PODIATRIST

## 2020-01-29 PROCEDURE — 1101F PR PT FALLS ASSESS DOC 0-1 FALLS W/OUT INJ PAST YR: ICD-10-PCS | Mod: HCNC,CPTII,S$GLB, | Performed by: PODIATRIST

## 2020-01-29 PROCEDURE — 3078F DIAST BP <80 MM HG: CPT | Mod: HCNC,CPTII,S$GLB, | Performed by: PODIATRIST

## 2020-01-29 PROCEDURE — 99999 PR PBB SHADOW E&M-EST. PATIENT-LVL IV: CPT | Mod: PBBFAC,HCNC,, | Performed by: PODIATRIST

## 2020-01-29 PROCEDURE — 99999 PR PBB SHADOW E&M-EST. PATIENT-LVL IV: ICD-10-PCS | Mod: PBBFAC,HCNC,, | Performed by: PODIATRIST

## 2020-01-29 PROCEDURE — 99214 OFFICE O/P EST MOD 30 MIN: CPT | Mod: HCNC,S$GLB,, | Performed by: PODIATRIST

## 2020-01-29 PROCEDURE — 3078F PR MOST RECENT DIASTOLIC BLOOD PRESSURE < 80 MM HG: ICD-10-PCS | Mod: HCNC,CPTII,S$GLB, | Performed by: PODIATRIST

## 2020-01-29 PROCEDURE — 99214 PR OFFICE/OUTPT VISIT, EST, LEVL IV, 30-39 MIN: ICD-10-PCS | Mod: HCNC,S$GLB,, | Performed by: PODIATRIST

## 2020-01-29 PROCEDURE — 1159F PR MEDICATION LIST DOCUMENTED IN MEDICAL RECORD: ICD-10-PCS | Mod: HCNC,S$GLB,, | Performed by: PODIATRIST

## 2020-01-30 ENCOUNTER — HOSPITAL ENCOUNTER (OUTPATIENT)
Facility: HOSPITAL | Age: 75
Discharge: HOME | End: 2020-01-30
Attending: INTERNAL MEDICINE | Admitting: INTERNAL MEDICINE
Payer: MEDICARE

## 2020-01-30 VITALS
OXYGEN SATURATION: 99 % | WEIGHT: 315 LBS | HEART RATE: 62 BPM | DIASTOLIC BLOOD PRESSURE: 74 MMHG | RESPIRATION RATE: 17 BRPM | TEMPERATURE: 98 F | BODY MASS INDEX: 45.67 KG/M2 | SYSTOLIC BLOOD PRESSURE: 169 MMHG

## 2020-01-30 DIAGNOSIS — G47.33 OBSTRUCTIVE SLEEP APNEA: Primary | ICD-10-CM

## 2020-01-30 DIAGNOSIS — I25.10 CORONARY ARTERY DISEASE INVOLVING NATIVE CORONARY ARTERY OF NATIVE HEART WITHOUT ANGINA PECTORIS: ICD-10-CM

## 2020-01-30 DIAGNOSIS — R06.00 DYSPNEA, UNSPECIFIED TYPE: ICD-10-CM

## 2020-01-30 DIAGNOSIS — I48.0 PAROXYSMAL ATRIAL FIBRILLATION: ICD-10-CM

## 2020-01-30 DIAGNOSIS — R60.0 BILATERAL LEG EDEMA: ICD-10-CM

## 2020-01-30 DIAGNOSIS — I10 ESSENTIAL HYPERTENSION: ICD-10-CM

## 2020-01-30 DIAGNOSIS — F41.1 GENERALIZED ANXIETY DISORDER: ICD-10-CM

## 2020-01-30 DIAGNOSIS — G47.21 DELAYED SLEEP PHASE SYNDROME: ICD-10-CM

## 2020-01-30 DIAGNOSIS — N40.1 BENIGN PROSTATIC HYPERPLASIA WITH URINARY OBSTRUCTION: ICD-10-CM

## 2020-01-30 DIAGNOSIS — N13.8 BENIGN PROSTATIC HYPERPLASIA WITH URINARY OBSTRUCTION: ICD-10-CM

## 2020-01-30 DIAGNOSIS — Z87.39 HISTORY OF GOUT: ICD-10-CM

## 2020-01-30 DIAGNOSIS — R29.898 LEFT LEG WEAKNESS: ICD-10-CM

## 2020-01-30 DIAGNOSIS — R07.89 CHEST PAIN, ATYPICAL: ICD-10-CM

## 2020-01-30 DIAGNOSIS — G47.01 INSOMNIA DUE TO MEDICAL CONDITION: ICD-10-CM

## 2020-01-30 DIAGNOSIS — Z87.09 PERSONAL HISTORY OF ASBESTOSIS: ICD-10-CM

## 2020-01-30 DIAGNOSIS — M25.672 DECREASED RANGE OF MOTION OF LEFT ANKLE: ICD-10-CM

## 2020-01-30 DIAGNOSIS — C61 PROSTATE CANCER: ICD-10-CM

## 2020-01-30 DIAGNOSIS — Z74.09 IMPAIRED MOBILITY: ICD-10-CM

## 2020-01-30 DIAGNOSIS — R29.898 RIGHT LEG WEAKNESS: ICD-10-CM

## 2020-01-30 DIAGNOSIS — R26.9 GAIT ABNORMALITY: ICD-10-CM

## 2020-01-30 DIAGNOSIS — H02.59 FLOPPY EYELID SYNDROME: ICD-10-CM

## 2020-01-30 DIAGNOSIS — K21.9 GASTROESOPHAGEAL REFLUX DISEASE, ESOPHAGITIS PRESENCE NOT SPECIFIED: ICD-10-CM

## 2020-01-30 DIAGNOSIS — F33.0 MAJOR DEPRESSIVE DISORDER, RECURRENT EPISODE, MILD: ICD-10-CM

## 2020-01-30 DIAGNOSIS — I73.9 PVD (PERIPHERAL VASCULAR DISEASE): ICD-10-CM

## 2020-01-30 DIAGNOSIS — L97.321 CHRONIC ULCER OF LEFT ANKLE LIMITED TO BREAKDOWN OF SKIN: ICD-10-CM

## 2020-01-30 DIAGNOSIS — I65.23 BILATERAL CAROTID ARTERY STENOSIS: ICD-10-CM

## 2020-01-30 DIAGNOSIS — B00.52 HERPES SIMPLEX KERATOCONJUNCTIVITIS: ICD-10-CM

## 2020-01-30 DIAGNOSIS — J98.4 RESTRICTIVE LUNG DISEASE: ICD-10-CM

## 2020-01-30 DIAGNOSIS — E78.00 PURE HYPERCHOLESTEROLEMIA: ICD-10-CM

## 2020-01-30 DIAGNOSIS — R26.89 BALANCE PROBLEMS: ICD-10-CM

## 2020-01-30 DIAGNOSIS — E66.01 MORBID OBESITY WITH BODY MASS INDEX OF 45.0-49.9 IN ADULT: ICD-10-CM

## 2020-01-30 LAB
ALLENS TEST: ABNORMAL
POC ACTIVATED CLOTTING TIME K: 158 SEC (ref 74–137)
POC ACTIVATED CLOTTING TIME K: 268 SEC (ref 74–137)
SAMPLE: ABNORMAL
SAMPLE: ABNORMAL
SITE: ABNORMAL

## 2020-01-30 PROCEDURE — C1887 CATHETER, GUIDING: HCPCS | Mod: HCNC | Performed by: INTERNAL MEDICINE

## 2020-01-30 PROCEDURE — 92978 ENDOLUMINL IVUS OCT C 1ST: CPT

## 2020-01-30 PROCEDURE — 63600175 PHARM REV CODE 636 W HCPCS: Mod: HCNC | Performed by: INTERNAL MEDICINE

## 2020-01-30 PROCEDURE — 93458 PR CATH PLACE/CORON ANGIO, IMG SUPER/INTERP,W LEFT HEART VENTRICULOGRAPHY: ICD-10-PCS | Mod: 26,HCNC,, | Performed by: INTERNAL MEDICINE

## 2020-01-30 PROCEDURE — 85347 COAGULATION TIME ACTIVATED: CPT | Mod: HCNC | Performed by: INTERNAL MEDICINE

## 2020-01-30 PROCEDURE — 99152 MOD SED SAME PHYS/QHP 5/>YRS: CPT | Mod: HCNC | Performed by: INTERNAL MEDICINE

## 2020-01-30 PROCEDURE — 25000003 PHARM REV CODE 250: Mod: HCNC | Performed by: INTERNAL MEDICINE

## 2020-01-30 PROCEDURE — 93799: ICD-10-PCS | Mod: HCNC,,, | Performed by: INTERNAL MEDICINE

## 2020-01-30 PROCEDURE — 85347 COAGULATION TIME ACTIVATED: CPT | Mod: HCNC

## 2020-01-30 PROCEDURE — 93799 UNLISTED CV SVC/PROCEDURE: CPT | Mod: HCNC,,, | Performed by: INTERNAL MEDICINE

## 2020-01-30 PROCEDURE — 25500020 PHARM REV CODE 255: Mod: HCNC | Performed by: INTERNAL MEDICINE

## 2020-01-30 PROCEDURE — C1769 GUIDE WIRE: HCPCS | Mod: HCNC | Performed by: INTERNAL MEDICINE

## 2020-01-30 PROCEDURE — 93458 L HRT ARTERY/VENTRICLE ANGIO: CPT | Mod: HCNC | Performed by: INTERNAL MEDICINE

## 2020-01-30 PROCEDURE — 99153 MOD SED SAME PHYS/QHP EA: CPT | Mod: HCNC | Performed by: INTERNAL MEDICINE

## 2020-01-30 PROCEDURE — 99152 PR MOD CONSCIOUS SEDATION, SAME PHYS, 5+ YRS, FIRST 15 MIN: ICD-10-PCS | Mod: HCNC,,, | Performed by: INTERNAL MEDICINE

## 2020-01-30 PROCEDURE — C1753 CATH, INTRAVAS ULTRASOUND: HCPCS | Mod: HCNC | Performed by: INTERNAL MEDICINE

## 2020-01-30 PROCEDURE — 99152 MOD SED SAME PHYS/QHP 5/>YRS: CPT | Mod: HCNC,,, | Performed by: INTERNAL MEDICINE

## 2020-01-30 PROCEDURE — 99900035 HC TECH TIME PER 15 MIN (STAT): Mod: HCNC

## 2020-01-30 PROCEDURE — C1894 INTRO/SHEATH, NON-LASER: HCPCS | Mod: HCNC | Performed by: INTERNAL MEDICINE

## 2020-01-30 PROCEDURE — C1760 CLOSURE DEV, VASC: HCPCS | Mod: HCNC | Performed by: INTERNAL MEDICINE

## 2020-01-30 PROCEDURE — 93458 L HRT ARTERY/VENTRICLE ANGIO: CPT | Mod: 26,HCNC,, | Performed by: INTERNAL MEDICINE

## 2020-01-30 RX ORDER — MIDAZOLAM HYDROCHLORIDE 1 MG/ML
INJECTION, SOLUTION INTRAMUSCULAR; INTRAVENOUS
Status: DISCONTINUED | OUTPATIENT
Start: 2020-01-30 | End: 2020-01-30 | Stop reason: HOSPADM

## 2020-01-30 RX ORDER — CEFAZOLIN SODIUM 2 G/50ML
SOLUTION INTRAVENOUS
Status: DISCONTINUED | OUTPATIENT
Start: 2020-01-30 | End: 2020-01-30 | Stop reason: HOSPADM

## 2020-01-30 RX ORDER — SODIUM CHLORIDE 9 MG/ML
INJECTION, SOLUTION INTRAVENOUS CONTINUOUS
Status: DISCONTINUED | OUTPATIENT
Start: 2020-01-30 | End: 2020-01-30 | Stop reason: HOSPADM

## 2020-01-30 RX ORDER — FENTANYL CITRATE 50 UG/ML
INJECTION, SOLUTION INTRAMUSCULAR; INTRAVENOUS
Status: DISCONTINUED | OUTPATIENT
Start: 2020-01-30 | End: 2020-01-30 | Stop reason: HOSPADM

## 2020-01-30 RX ORDER — LIDOCAINE HYDROCHLORIDE 10 MG/ML
INJECTION, SOLUTION EPIDURAL; INFILTRATION; INTRACAUDAL; PERINEURAL
Status: DISCONTINUED | OUTPATIENT
Start: 2020-01-30 | End: 2020-01-30 | Stop reason: HOSPADM

## 2020-01-30 RX ORDER — HYDROCODONE BITARTRATE AND ACETAMINOPHEN 5; 325 MG/1; MG/1
1 TABLET ORAL EVERY 4 HOURS PRN
Status: DISCONTINUED | OUTPATIENT
Start: 2020-01-30 | End: 2020-01-30 | Stop reason: HOSPADM

## 2020-01-30 RX ORDER — PROTAMINE SULFATE 10 MG/ML
INJECTION, SOLUTION INTRAVENOUS
Status: DISCONTINUED | OUTPATIENT
Start: 2020-01-30 | End: 2020-01-30 | Stop reason: HOSPADM

## 2020-01-30 RX ORDER — HEPARIN SODIUM 1000 [USP'U]/ML
INJECTION, SOLUTION INTRAVENOUS; SUBCUTANEOUS
Status: DISCONTINUED | OUTPATIENT
Start: 2020-01-30 | End: 2020-01-30 | Stop reason: HOSPADM

## 2020-01-30 RX ORDER — ACETAMINOPHEN 325 MG/1
650 TABLET ORAL EVERY 4 HOURS PRN
Status: DISCONTINUED | OUTPATIENT
Start: 2020-01-30 | End: 2020-01-30 | Stop reason: HOSPADM

## 2020-01-30 RX ADMIN — ACETAMINOPHEN 650 MG: 325 TABLET ORAL at 09:01

## 2020-01-30 RX ADMIN — SODIUM CHLORIDE: 0.9 INJECTION, SOLUTION INTRAVENOUS at 08:01

## 2020-01-30 RX ADMIN — HYDROCODONE BITARTRATE AND ACETAMINOPHEN 1 TABLET: 5; 325 TABLET ORAL at 06:01

## 2020-01-30 RX ADMIN — SODIUM CHLORIDE: 0.9 INJECTION, SOLUTION INTRAVENOUS at 07:01

## 2020-01-30 RX ADMIN — HYDROCODONE BITARTRATE AND ACETAMINOPHEN 1 TABLET: 5; 325 TABLET ORAL at 02:01

## 2020-01-30 NOTE — HPI
CAD - POLO to RCA > 20 years ago, PAF on eliquis ,[employer]tr[employer] ILR, obesity, HTN, HLD     Previosly followed by Dr Smith  Here follow-up of CAD and paroxysmal atrial fibrillation.  He did follow-up with EP at [facility] and his been started on blood thinners.  He still has mainly issues with shortness of breath and little bit of swelling from time to time.  He takes Lasix as needed.  He denies any PND or orthopnea.  He did follow-up is now Cheondoism with CPAP.  He's not expressing dizziness, presyncope or syncope.  We discussed again sodium intake which is a fan of the Chinese buffet.  We also discussed exercise as tolerated.     Here for follow-up of CAD and paroxysmal atrial fibrillation.  Had no worsening cardiopulmonary complaints.  He says slowly able to go up a flight of stairs without any issues.  He denies any chest pain but does get shortness of breath on heavier activity.  He's had no sustained tachycardia or palpitations.  Recent loop recorder transmission showed only sinus rhythm.  She denies any PND, orthopnea or lower edema.  He's not expressing dizziness, presyncope or syncope.  He does need cardiac clearance for prostate seeding with diagnosed cancer.     Lower extremity arterial ultrasound: 2016  No appreciable focal stenosis; however, ankle-brachial index on the left is abnormal.     Echo: 7/23/19  · Normal left ventricular systolic function. The estimated ejection fraction is 60%  · No wall motion abnormalities.  · Moderate eccentric left ventricular hypertrophy.  · Mild left ventricular enlargement.  · Normal right ventricular systolic function.  · The sinuses of Valsalva is mildly dilated. 3.8cm.  · Mild aortic regurgitation.  · The estimated PA systolic pressure is 33 mm Hg      PET stress 11/26/19    There is a very small sized, mild intensity  inferobasilar involving 5% of the LV myocardium.  After pharmacologic stress this defect improves indicative of non-transmural scar.    There is a very  small (<5%) sized, mild intensity  inferoapical stress induced perfusion abnormality involving 2.0% of LV myocardium in the distribution of the distal PDA territory.    Whole heart absolute myocardial perfusion (cc/min/g) averaged 1.03 cc/min/g at rest (which is elevated), 1.25 cc/min/g at stress (which is mildly reduced), and 1.24 CFR (which is severely reduced in part due to elevated resting flow).    Gated perfusion images showed an ejection fraction of 65% at rest and 83% during stress.    Normal ejection fraction is greater than 51%.    Wall motion was normal at rest and during stress.    LV cavity size is normal at rest and stress.    The EKG portion of this study is negative for ischemia.    There were no arrhythmias during stress.    The patient reported no chest pain during the stress test.    When compared to the prior study on 9/26/2012, there is a new small inferobasilar fixed defect consistent with a non-transmural infarct and a small new inferoapical reversible defect. Overall whole heart absolute stress myocardial blood flow has not changed.     NST:  12-17  Impression: ABNORMAL MYOCARDIAL PERFUSION  1. There is evidence for mild myocardial ischemia in the anterior wall of the left ventricle. Specificity is reduced secondary to body habitus.   2. The perfusion scan is free of evidence for myocardial injury.   3. There is a moderate intensity fixed defect in the inferior wall of the left ventricle, secondary to diaphragm attenuation.   4. Resting wall motion is physiologic.   5. Visually estimated LV function is normal.   6. The ventricular volumes are normal at rest and stress.   7. The extracardiac distribution of radioactivity is normal.   8. When compared to the previous study from 01/14/2009, possible anterior ischemia (vs attenuation artifact) and fixed inferior defect (suggestive of attenuation artifact) now present.  Consider PET-stress for further evaluation if clinically  indicated.     LDL-84  11-17     Carotid ultrasound:  5-18  CONCLUSIONS   There is 20 - 39% right Internal Carotid stenosis.  There is 20 - 39% left Internal Carotid stenosis.     10/24/19 Reports worsening PURDY - sometimes associated with chest tightness  EKG NSR - ok     12/11/19 Continues to report PURDY despite losing 30 pounds    Discussed abnormal stress findings - only a very small region of inferoapical ischemia noted. Discussed LHC - will try adding imdur 30 qd and reassessing symptoms in 1 month     1/23/20 Just got imdur filled. Continues to hav significant PURDY

## 2020-01-30 NOTE — ASSESSMENT & PLAN NOTE
Patent RCA stent. LM stenosis extending into ostial LAD. F/U with Dr Brady next week to discuss CABG versus staged PCI of LAD. Continue plavix. Stop ASA to avoid bleeding risk with triple therapy

## 2020-01-30 NOTE — PROCEDURES
Janusz Montgomery Jr. is a 75 y.o. male patient.    Temp: 97.9 °F (36.6 °C) (01/30/20 0643)  Pulse: 78 (01/30/20 0643)  Resp: 18 (01/30/20 0643)  BP: 133/63 (01/30/20 0643)  SpO2: 95 % (01/30/20 0643)  Weight: (!) 157 kg (346 lb 2 oz) (01/29/20 1110)       Procedures     Pre-sedation Assessment:    1. ASA Score: ASA 3 - Patient with moderate systemic disease with functional limitations  2. Mallampati Class: III (soft and hard palate and base of uvula visible)  3. Patient or family history of any reaction to anesthesia or sedation: No  4. Plan for Sedation: Moderate  5. H&P within 30 days of the procedure and updated within 24 hrs of admission or registration: Yes    Benjamin Sandhu  1/30/2020

## 2020-01-30 NOTE — PROCEDURES
Janusz Montgomery Jr. is a 75 y.o. male patient.    Temp: 97.9 °F (36.6 °C) (01/30/20 0643)  Pulse: 78 (01/30/20 0643)  Resp: 18 (01/30/20 0643)  BP: 133/63 (01/30/20 0643)  SpO2: 95 % (01/30/20 0643)  Weight: (!) 157 kg (346 lb 2 oz) (01/29/20 1110)       Procedures     [facility]   Dr Sandhu  Pre-op Dx CAD  Post-op Dx same  Specimen none  EBL < 50 cc    1/30/20 [facility] - EDP 20, LAD ostial 80%, Cx ok, RCA mid stent patent - mild diffuse disease    Reviewed with Dr Brady  IVUS of LM/LAD today  Refer to CT surgery out patient - if not CABG candidate then high risk PCI of LAD    Benjamin Sandhu  1/30/2020

## 2020-01-30 NOTE — HOSPITAL COURSE
1/30/20 [facility] - EDP 20, LAD ostial 80%, Cx ok, RCA mid stent patent - mild diffuse disease     Reviewed with Dr Brady  IVUS of LM/LAD today  Refer to CT surgery out patient - if not CABG candidate then high risk PCI of LAD    There is a distal left main 50% stenosis, extending into ostial LAD which is 90% stenosed.  IVUS was performed on left main, lad.  Then the wire was redirected from the left main into the circumflex and IVUS was performed on the left main and proximal circumflex also.  IVUS images reviewed and revealed block extending from distal left main into the ostial LAD.  Not much block per done in ostial circumflex.  ML a and left main is 7.8 mm2.  Assessment and plan   CT surgery consultation.  If patient not a candidate for CT surgery, then PCI from left main into ostial LAD.  Provisional PCI of circumflex if required due to plaque shift into circumflex.

## 2020-01-30 NOTE — Clinical Note
Catheter is inserted into the ostium   left main. Angiography performed of the left coronary arteries in multiple views. Wire removed prior to angiography.

## 2020-01-30 NOTE — DISCHARGE SUMMARY
[facility] [place]  Cardiology  Discharge Summary      Patient Name: Janusz Montgomery Jr.  MRN: [id number]  Admission Date: 1/30/2020  Hospital Length of Stay: 0 days  Discharge Date and Time:  01/30/2020 10:54 AM  Attending Physician: Benjamin Sandhu MD    Discharging Provider: Benjamin Sandhu MD  Primary Care Physician: Miguelina Weathers MD    HPI:   CAD - POLO to RCA > 20 years ago, PAF on eliquis ,[employer] ILR, obesity, HTN, HLD     Previosly followed by Dr Smith  Here follow-up of CAD and paroxysmal atrial fibrillation.  He did follow-up with EP at [facility] and his been started on blood thinners.  He still has mainly issues with shortness of breath and little bit of swelling from time to time.  He takes Lasix as needed.  He denies any PND or orthopnea.  He did follow-up is now [employer] with CPAP.  He's not expressing dizziness, presyncope or syncope.  We discussed again sodium intake which is a fan of the [employer].  We also discussed exercise as tolerated.     Here for follow-up of CAD and paroxysmal atrial fibrillation.  Had no worsening cardiopulmonary complaints.  He says slowly able to go up a flight of stairs without any issues.  He denies any chest pain but does get shortness of breath on heavier activity.  He's had no sustained tachycardia or palpitations.  Recent loop recorder transmission showed only sinus rhythm.  She denies any PND, orthopnea or lower edema.  He's not expressing dizziness, presyncope or syncope.  He does need cardiac clearance for prostate seeding with diagnosed cancer.     Lower extremity arterial ultrasound: 2016  No appreciable focal stenosis; however, ankle-brachial index on the left is abnormal.     Echo: 7/23/19  · Normal left ventricular systolic function. The estimated ejection fraction is 60%  · No wall motion abnormalities.  · Moderate eccentric left ventricular hypertrophy.  · Mild left ventricular enlargement.  · Normal right ventricular systolic  function.  · The sinuses of Valsalva is mildly dilated. 3.8cm.  · Mild aortic regurgitation.  · The estimated PA systolic pressure is 33 mm Hg      PET stress 11/26/19    There is a very small sized, mild intensity  inferobasilar involving 5% of the LV myocardium.  After pharmacologic stress this defect improves indicative of non-transmural scar.    There is a very small (<5%) sized, mild intensity  inferoapical stress induced perfusion abnormality involving 2.0% of LV myocardium in the distribution of the distal PDA territory.    Whole heart absolute myocardial perfusion (cc/min/g) averaged 1.03 cc/min/g at rest (which is elevated), 1.25 cc/min/g at stress (which is mildly reduced), and 1.24 CFR (which is severely reduced in part due to elevated resting flow).    Gated perfusion images showed an ejection fraction of 65% at rest and 83% during stress.    Normal ejection fraction is greater than 51%.    Wall motion was normal at rest and during stress.    LV cavity size is normal at rest and stress.    The EKG portion of this study is negative for ischemia.    There were no arrhythmias during stress.    The patient reported no chest pain during the stress test.    When compared to the prior study on 9/26/2012, there is a new small inferobasilar fixed defect consistent with a non-transmural infarct and a small new inferoapical reversible defect. Overall whole heart absolute stress myocardial blood flow has not changed.     NST:  12-17  Impression: ABNORMAL MYOCARDIAL PERFUSION  1. There is evidence for mild myocardial ischemia in the anterior wall of the left ventricle. Specificity is reduced secondary to body habitus.   2. The perfusion scan is free of evidence for myocardial injury.   3. There is a moderate intensity fixed defect in the inferior wall of the left ventricle, secondary to diaphragm attenuation.   4. Resting wall motion is physiologic.   5. Visually estimated LV function is normal.   6. The  ventricular volumes are normal at rest and stress.   7. The extracardiac distribution of radioactivity is normal.   8. When compared to the previous study from 01/14/2009, possible anterior ischemia (vs attenuation artifact) and fixed inferior defect (suggestive of attenuation artifact) now present.  Consider PET-stress for further evaluation if clinically indicated.     LDL-84  11-17     Carotid ultrasound:  5-18  CONCLUSIONS   There is 20 - 39% right Internal Carotid stenosis.  There is 20 - 39% left Internal Carotid stenosis.     10/24/19 Reports worsening PURDY - sometimes associated with chest tightness  EKG NSR - ok     12/11/19 Continues to report PURDY despite losing 30 pounds    Discussed abnormal stress findings - only a very small region of inferoapical ischemia noted. Discussed LHC - will try adding imdur 30 qd and reassessing symptoms in 1 month     1/23/20 Just got imdur filled. Continues to hav significant PURDY       Procedure(s) (LRB):  Left heart cath (Left)  IVUS, Coronary     Indwelling Lines/Drains at time of discharge:  Lines/Drains/Airways     None                 Hospital Course:  1/30/20 [facility] - EDP 20, LAD ostial 80%, Cx ok, RCA mid stent patent - mild diffuse disease     Reviewed with Dr Brady  IVUS of LM/LAD today  Refer to CT surgery out patient - if not CABG candidate then high risk PCI of LAD    There is a distal left main 50% stenosis, extending into ostial LAD which is 90% stenosed.  IVUS was performed on left main, lad.  Then the wire was redirected from the left main into the circumflex and IVUS was performed on the left main and proximal circumflex also.  IVUS images reviewed and revealed block extending from distal left main into the ostial LAD.  Not much block per done in ostial circumflex.  ML a and left main is 7.8 mm2.  Assessment and plan   CT surgery consultation.  If patient not a candidate for CT surgery, then PCI from left main into ostial LAD.  Provisional PCI of circumflex if  required due to plaque shift into circumflex.    Consults:     Significant Diagnostic Studies: Angiography:     Pending Diagnostic Studies:     None          Final Active Diagnoses:    Diagnosis Date Noted POA    PRINCIPAL PROBLEM:  Coronary artery disease involving native coronary artery of native heart without angina pectoris s/p RCA stent [I25.10]  Yes    Chest pain, atypical [R07.89] 10/24/2019 Yes    Paroxysmal atrial fibrillation  [I48.0] 07/06/2016 Yes      Problems Resolved During this Admission:     * Coronary artery disease involving native coronary artery of native heart without angina pectoris s/p RCA stent  Patent RCA stent. LM stenosis extending into ostial LAD. F/U with Dr Brady next week to discuss CABG versus staged PCI of LAD. Continue plavix. Stop ASA to avoid bleeding risk with triple therapy    Paroxysmal atrial fibrillation   Resume eliquis in AM        Discharged Condition: good    Disposition: Home or Self Care    Follow Up: Dr Brady 1 week    Patient Instructions:   No discharge procedures on file.  Medications:  Reconciled Home Medications:      Medication List      CHANGE how you take these medications    artificial tears 0.5 % ophthalmic solution  Commonly known as:  ISOPTO TEARS  Place 1 drop into both eyes as needed.  What changed:  reasons to take this     furosemide 20 MG tablet  Commonly known as:  LASIX  Take 1 tablet (20 mg total) by mouth once daily.  What changed:    · when to take this  · reasons to take this     gabapentin 300 MG capsule  Commonly known as:  NEURONTIN  Take 1 capsule (300 mg total) by mouth 2 (two) times daily.  What changed:    · when to take this  · reasons to take this        CONTINUE taking these medications    acyclovir 800 MG Tab  Commonly known as:  ZOVIRAX  Take 1 tablet (800 mg total) by mouth 2 (two) times daily.     albuterol 90 mcg/actuation inhaler  Commonly known as:  PROVENTIL/VENTOLIN HFA  Inhale 2 puffs into the lungs every 6 (six) hours as  needed for Wheezing. Rescue     apixaban 5 mg Tab  Commonly known as:  ELIQUIS  Take 1 tablet (5 mg total) by mouth 2 (two) times daily.     atorvastatin 40 MG tablet  Commonly known as:  LIPITOR  Take 1 tablet (40 mg total) by mouth once daily. Please make a follow up visit with your doctor for more refills of this medication     azelastine 137 mcg (0.1 %) nasal spray  Commonly known as:  ASTELIN  1 spray (137 mcg total) by Nasal route 2 (two) times daily.     clopidogrel 75 mg tablet  Commonly known as:  PLAVIX  Take 1 tablet (75 mg total) by mouth once daily. 8 pills the first day     fluticasone propionate 50 mcg/actuation nasal spray  Commonly known as:  FLONASE  1 spray (50 mcg total) by Each Nostril route once daily.     melatonin  Commonly known as:  MELATIN  Take 10 mg by mouth nightly.     metoprolol succinate 25 MG 24 hr tablet  Commonly known as:  TOPROL-XL  Take 1 tablet (25 mg total) by mouth once daily.     Multiple Vitamins per tablet  Generic drug:  multivitamin  Take 1 tablet by mouth once daily.     nitroGLYCERIN 0.4 MG SL tablet  Commonly known as:  NITROSTAT  Place 1 tablet (0.4 mg total) under the tongue every 5 (five) minutes as needed for Chest pain.     olmesartan 20 MG tablet  Commonly known as:  BENICAR  Take 1 tablet (20 mg total) by mouth once daily. (replaces ramipril for blood pressure)     oxyCODONE-acetaminophen 5-325 mg per tablet  Commonly known as:  Percocet  Take 1 tablet by mouth every 4 to 6 hours as needed for Pain.     tamsulosin 0.4 mg Cap  Commonly known as:  FLOMAX  Take 1 capsule (0.4 mg total) by mouth once daily.     venlafaxine 75 MG tablet  Commonly known as:  EFFEXOR  Take 2 tablets (150 mg total) by mouth 2 (two) times daily.     VITAMIN B-12 ORAL  Take 2,500 mcg by mouth once daily.     vitamin D 1000 units Tab  Commonly known as:  VITAMIN D3  Take 1 tablet (1,000 Units total) by mouth once daily.        STOP taking these medications    aspirin 81 MG EC  tablet  Commonly known as:  ECOTRIN     omeprazole 20 MG capsule  Commonly known as:  PRILOSEC            Time spent on the discharge of patient: 30 minutes    Benjamin Sandhu MD  Cardiology  [facility]

## 2020-01-30 NOTE — H&P
[facility] - Cardiology  History & Physical    Subjective:      Chief Complaint/Reason for Admission: [facility]    Janusz Montgomery Jr. is a 75 y.o. male.    CAD - POLO to RCA > 20 years ago, PAF on eliquis ,[employer] ILR, obesity, HTN, HLD     Previosly followed by Dr Smith  Here follow-up of CAD and paroxysmal atrial fibrillation.  He did follow-up with EP at [facility] and his been started on blood thinners.  He still has mainly issues with shortness of breath and little bit of swelling from time to time.  He takes Lasix as needed.  He denies any PND or orthopnea.  He did follow-up is now [facility] with CPAP.  He's not expressing dizziness, presyncope or syncope.  We discussed again sodium intake which is a fan of the [employer] buff[employer].  We also discussed exercise as tolerated.     Here for follow-up of CAD and paroxysmal atrial fibrillation.  Had no worsening cardiopulmonary complaints.  He says slowly able to go up a flight of stairs without any issues.  He denies any chest pain but does get shortness of breath on heavier activity.  He's had no sustained tachycardia or palpitations.  Recent loop recorder transmission showed only sinus rhythm.  She denies any PND, orthopnea or lower edema.  He's not expressing dizziness, presyncope or syncope.  He does need cardiac clearance for prostate seeding with diagnosed cancer.     Lower extremity arterial ultrasound: 2016  No appreciable focal stenosis; however, ankle-brachial index on the left is abnormal.     Echo: 7/23/19  · Normal left ventricular systolic function. The estimated ejection fraction is 60%  · No wall motion abnormalities.  · Moderate eccentric left ventricular hypertrophy.  · Mild left ventricular enlargement.  · Normal right ventricular systolic function.  · The sinuses of Valsalva is mildly dilated. 3.8cm.  · Mild aortic regurgitation.  · The estimated PA systolic pressure is 33 mm Hg      PET stress 11/26/19    There is a very small sized, mild intensity   inferobasilar involving 5% of the LV myocardium.  After pharmacologic stress this defect improves indicative of non-transmural scar.    There is a very small (<5%) sized, mild intensity  inferoapical stress induced perfusion abnormality involving 2.0% of LV myocardium in the distribution of the distal PDA territory.    Whole heart absolute myocardial perfusion (cc/min/g) averaged 1.03 cc/min/g at rest (which is elevated), 1.25 cc/min/g at stress (which is mildly reduced), and 1.24 CFR (which is severely reduced in part due to elevated resting flow).    Gated perfusion images showed an ejection fraction of 65% at rest and 83% during stress.    Normal ejection fraction is greater than 51%.    Wall motion was normal at rest and during stress.    LV cavity size is normal at rest and stress.    The EKG portion of this study is negative for ischemia.    There were no arrhythmias during stress.    The patient reported no chest pain during the stress test.    When compared to the prior study on 9/26/2012, there is a new small inferobasilar fixed defect consistent with a non-transmural infarct and a small new inferoapical reversible defect. Overall whole heart absolute stress myocardial blood flow has not changed.     NST:  12-17  Impression: ABNORMAL MYOCARDIAL PERFUSION  1. There is evidence for mild myocardial ischemia in the anterior wall of the left ventricle. Specificity is reduced secondary to body habitus.   2. The perfusion scan is free of evidence for myocardial injury.   3. There is a moderate intensity fixed defect in the inferior wall of the left ventricle, secondary to diaphragm attenuation.   4. Resting wall motion is physiologic.   5. Visually estimated LV function is normal.   6. The ventricular volumes are normal at rest and stress.   7. The extracardiac distribution of radioactivity is normal.   8. When compared to the previous study from 01/14/2009, possible anterior ischemia (vs attenuation  artifact) and fixed inferior defect (suggestive of attenuation artifact) now present.  Consider PET-stress for further evaluation if clinically indicated.     LDL-84  11-17     Carotid ultrasound:  5-18  CONCLUSIONS   There is 20 - 39% right Internal Carotid stenosis.  There is 20 - 39% left Internal Carotid stenosis.     10/24/19 Reports worsening PURDY - sometimes associated with chest tightness  EKG NSR - ok     12/11/19 Continues to report PURDY despite losing 30 pounds    Discussed abnormal stress findings - only a very small region of inferoapical ischemia noted. Discussed LHC - will try adding imdur 30 qd and reassessing symptoms in 1 month    1/23/20 Just got imdur filled. 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 pylori) infection     Treated    Herpes simplex keratoconjunctivitis 9/30/2015    - on acyclovir - followed by opthalmology, Dr. Uribe     Herpes simplex keratoconjunctivitis 9/30/2015    - on acyclovir - followed by opthalmology, Dr. Uribe     Hyperkalemia 2/28/2017    Hyperlipidemia     Hypogonadism male      "Hypogonadism male     Mixed anxiety and depressive disorder     Morbid obesity     Obstructive sleep apnea on CPAP     CPAP    Osteoarthritis of left knee 7/25/2012    Paroxysmal atrial fibrillation 7/6/2016    Primary osteoarthritis of left knee 7/25/2012    Prominent aorta 1/25/2016    "RESULTS: THE HEART IS MILDLY ENLARGED WITH A SLIGHTLY PROMINENT AORTA" - Xray Chest PA & Lateral 12-     Prostate cancer 2/15/2016    - followed by urology, Dr. Young     Prostate cancer 2/15/2016    - followed by urology, Dr. 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 Conteh    CATARACT EXTRACTION W/  INTRAOCULAR LENS IMPLANT Left 4/12/2016        EXTERNAL FIXATION TIBIAL FRACTURE Left 07/01/2016    INSERTION OF IMPLANTABLE LOOP RECORDER  04/07/2017    ORIF TIBIA FRACTURE Left 07/15/2016    RADIOACTIVE SEED IMPLANTATION OF PROSTATE N/A 8/8/2018    Procedure: INSERTION, RADIOACTIVE SEED, PROSTATE;  Surgeon: Bipin Thompson MD;  Location: [facility] OR [address] [place] [address];  Service: Urology;  Laterality: N/A;  1 hour    Squamous cell cancer removal x3 with Mohs surgery      TONSILLECTOMY      TOTAL KNEE ARTHROPLASTY  10/2012    trus/bx       Family History   Problem Relation Age of Onset    Skin cancer Father     Lung cancer Father     Cancer Father         smoker,     Alzheimer's disease Mother     Hypertension Mother     Cancer Sister "         colon, lung cancer     No Known Problems Sister     Cancer Brother         skin cancer, polypectomy     Peripheral vascular disease Unknown     No Known Problems Maternal Aunt     No Known Problems Maternal Uncle     No Known Problems Paternal Aunt     No Known Problems Paternal Uncle     No Known Problems Maternal Grandmother     No Known Problems Maternal Grandfather     No Known Problems Paternal Grandmother     No Known Problems Paternal Grandfather     Melanoma Neg Hx     Psoriasis Neg Hx     Lupus Neg Hx     Eczema Neg Hx     Amblyopia Neg Hx     Blindness Neg Hx     Cataracts Neg Hx     Diabetes Neg Hx     Glaucoma Neg Hx     Macular degeneration Neg Hx     Retinal detachment Neg Hx     Strabismus Neg Hx     Stroke Neg Hx     Thyroid disease Neg Hx     Acne Neg Hx      Social History     Tobacco Use    Smoking status: Never Smoker    Smokeless tobacco: Never Used   Substance Use Topics    Alcohol use: Yes     Frequency: 2-4 times a month     Drinks per session: 3 or 4     Binge frequency: Less than monthly     Comment: occasionally, beer    Drug use: No       PTA Medications   Medication Sig    acyclovir (ZOVIRAX) 800 MG Tab Take 1 tablet (800 mg total) by mouth 2 (two) times daily.    artificial tears (ISOPTO TEARS) 0.5 % ophthalmic solution Place 1 drop into both eyes as needed. (Patient taking differently: Place 1 drop into both eyes as needed (for dry eyes). )    aspirin (ECOTRIN) 81 MG EC tablet Take 1 tablet (81 mg total) by mouth once daily.    atorvastatin (LIPITOR) 40 MG tablet Take 1 tablet (40 mg total) by mouth once daily. Please make a follow up visit with your doctor for more refills of this medication    clopidogrel (PLAVIX) 75 mg tablet Take 1 tablet (75 mg total) by mouth once daily. 8 pills the first day    CYANOCOBALAMIN, VITAMIN B-12, (VITAMIN B-12 ORAL) Take 2,500 mcg by mouth once daily.    melatonin 5 mg Tab Take 10 mg by mouth nightly.     metoprolol succinate (TOPROL-XL) 25 MG 24 hr tablet Take 1 tablet (25 mg total) by mouth once daily.    multivitamin (MULTIPLE VITAMINS) per tablet Take 1 tablet by mouth once daily.    olmesartan (BENICAR) 20 MG tablet Take 1 tablet (20 mg total) by mouth once daily. (replaces ramipril for blood pressure)    tamsulosin (FLOMAX) 0.4 mg Cap Take 1 capsule (0.4 mg total) by mouth once daily.    venlafaxine (EFFEXOR) 75 MG tablet Take 2 tablets (150 mg total) by mouth 2 (two) times daily.    vitamin D 1000 units Tab Take 1 tablet (1,000 Units total) by mouth once daily.    albuterol (PROVENTIL/VENTOLIN HFA) 90 mcg/actuation inhaler Inhale 2 puffs into the lungs every 6 (six) hours as needed for Wheezing. Rescue    apixaban (ELIQUIS) 5 mg Tab Take 1 tablet (5 mg total) by mouth 2 (two) times daily.    azelastine (ASTELIN) 137 mcg (0.1 %) nasal spray 1 spray (137 mcg total) by Nasal route 2 (two) times daily.    fluticasone propionate (FLONASE) 50 mcg/actuation nasal spray 1 spray (50 mcg total) by Each Nostril route once daily.    furosemide (LASIX) 20 MG tablet Take 1 tablet (20 mg total) by mouth once daily. (Patient taking differently: Take 20 mg by mouth daily as needed. )    gabapentin (NEURONTIN) 300 MG capsule Take 1 capsule (300 mg total) by mouth 2 (two) times daily. (Patient taking differently: Take 300 mg by mouth 2 (two) times daily as needed. )    nitroGLYCERIN (NITROSTAT) 0.4 MG SL tablet Place 1 tablet (0.4 mg total) under the tongue every 5 (five) minutes as needed for Chest pain.    omeprazole (PRILOSEC) 20 MG capsule Take 1 capsule (20 mg total) by mouth daily as needed.    oxyCODONE-acetaminophen (PERCOCET) 5-325 mg per tablet Take 1 tablet by mouth every 4 to 6 hours as needed for Pain.     Review of patient's allergies indicates:   Allergen Reactions    Ciprofloxacin Rash     Diffuse pruritic morbilliform rash developed 3/15/2017 after dose of cipro; previously in 2/2017 he had  rash/fevers after initiation of cipro    Zosyn [piperacillin-tazobactam] Rash     Diffuse pruritic morbilliform rash developed 3/15/2017.  Then, 430am dose on 3/16 and rash worsened with SOB/tachypnea but no hypoxemia.     Bacitracin Itching and Rash     Violaceous rash in area of topical Tx.         Review of Systems   All other systems reviewed and are negative.      Objective:      Vital Signs (Most Recent)  Temp: 97.9 °F (36.6 °C) (01/30/20 0643)  Pulse: 78 (01/30/20 0643)  Resp: 18 (01/30/20 0643)  BP: 133/63 (01/30/20 0643)  SpO2: 95 % (01/30/20 0643)    Vital Signs Range (Last 24H):  [unfilled]    Physical Exam   Constitutional: He is oriented to person, place, and time. He appears well-developed and well-nourished.   HENT:   Head: Normocephalic and atraumatic.   Eyes: Pupils are equal, round, and reactive to light. EOM are normal.   Neck: Normal range of motion. Neck supple.   Cardiovascular: Normal rate and regular rhythm.   Pulmonary/Chest: Effort normal and breath sounds normal.   Abdominal: Soft. Bowel sounds are normal.   Musculoskeletal: Normal range of motion.   Neurological: He is alert and oriented to person, place, and time.   Skin: Skin is warm and dry.       Data Review:    CBC:   Lab Results   Component Value Date    WBC 10.17 01/28/2020    RBC 3.43 (L) 01/28/2020    HGB 11.5 (L) 01/28/2020    HCT 34.5 (L) 01/28/2020     01/28/2020     BMP:   Lab Results   Component Value Date    GLU 98 01/28/2020     01/28/2020    K 4.5 01/28/2020     01/28/2020    CO2 28 01/28/2020    BUN 18 01/28/2020    CREATININE 1.2 01/28/2020    CALCIUM 8.6 (L) 01/28/2020      ECG: NSR NSSTT changes.     Assessment:      Active Hospital Problems    Diagnosis  POA    Chest pain, atypical [R07.89]  Yes      Resolved Hospital Problems   No resolved problems to display.     PURDY - abnormal PET  On ASA/plavix and 2 anti anginal medications    Plan:      Hold eliquis 3 days before cath

## 2020-01-30 NOTE — PLAN OF CARE
1039-Dr. Brady notified R groin site is oozing.    1112-Dr. Brady notified site is still oozing after holding pressure for 30 minutes.     1123-Dr. Brady is bedside. Injected groin site with 20 ml of lidocaine Hcl 2% and Epinephrine 1:100,000. Then applied pressure dressing to site.    1415-Pressure dressing removed. Still oozing. Dr. Brady notified. New orders noted. Pressure dressing applied.  Surrounding groin area remains soft. Site without redness or swelling. Will continue to monitor.      1545-Report given to Jillian Frias RN. Pt supine with HOB flat. Pressure dressing intact.  Surrounding groin area remains soft. Site without redness or swelling. Family at bedside.    General

## 2020-01-31 ENCOUNTER — PATIENT MESSAGE (OUTPATIENT)
Dept: CARDIOLOGY | Facility: CLINIC | Age: 75
End: 2020-01-31

## 2020-01-31 NOTE — PROGRESS NOTES
Subjective:      Patient ID: Janusz Montgomery Jr. is a 75 y.o. male.    Chief Complaint: Wound Check (Dr Jaisel HARRISON)      Janusz Montgomery Jr. is a 75 y.o. malewho presents to the clinic for evaluation and treatment of high risk feet. Janusz Montgomery Jr.   has a past medical history of NAT (acute kidney injury) (3/19/2017), ALLERGIC RHINITIS, Anemia, Anxiety, Basal cell carcinoma (10/19/2018), Basal cell carcinoma (01/09/2019), Chronic rhinitis (5/3/2013), Chronic rhinitis (5/3/2013), Coronary artery disease involving native coronary artery of native heart without angina pectoris s/p RCA stent, Cortical cataract of both eyes (3/18/2016), Delayed sleep phase syndrome (3/13/2019), Depression, Erectile dysfunction (3/24/2014), Erectile dysfunction (3/24/2014), Essential hypertension, GERD (gastroesophageal reflux disease) (7/25/2012), Gout, arthritis, Grade III open fracture of left tibia and fibula s/p ex-fix on 7/1/16 and ORIF of left tibia on 7/15 (7/6/2016), H/O: iritis, Helicobacter pylori (H. 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  The patient's  chief complaint is an anterior left leg wound.  One month duration.  Secondary to mild trauma in the presence of edema.  Has attempted self treatment but wound has not healed.  Non painful. Denies nausea, vomiting, fever, chills. History of left foot and ankle ulcers.  This patient has documented high risk feet requiring routine maintenance secondary to peripheral vascular disease.    12/10/2019  Patient relates he has attempted to care for leg ulcer as instructed but wound dimensions have not improved.    12/19/2019 patient relates that he has kept dressing clean dry and intact without itching or discomfort.  He has begun taking antibiotics as prescribed without nausea, vomiting, diarrhea, fever, chills.  No new pedal complaints.    12/31/19: F/u left leg ulceration. Has been in unna boot without issue.     01/07/2020 Patient relates that he has kept dressing clean dry and intact without itching or discomfort. He relates increased callus to posterior heel.  He denies nausea, vomiting, diarrhea, fever, chills.  No new pedal complaints.    01/14/2020  Patient relates that he has kept dressing clean dry and intact without itching however he relates that dressing was slipping and therefore somewhat uncomfortable this week.   He denies nausea, vomiting, diarrhea, fever, chills.  No new pedal complaints.    01/21/2020 patient relates that he has kept dressing clean, dry, intact.  He has no pedal complaints.  He does eyes nausea, vomiting, fever, chills.  He is anxious to get out of Unna boot.    1/29/20: F/u left leg ulceration. Has been wearing tubi . Pt. Is concerned regarding wound appearance.       PCP: Miguelina Weathers MD    Date Last Seen by PCP:   Chief Complaint   Patient presents with    Wound Check     Dr Weathers PCP     Current shoe gear: tennis shoe    Hemoglobin A1C   Date Value Ref Range Status   07/11/2016 5.3 4.5 - 6.2 % Final     Comment:     According to ADA guidelines, hemoglobin A1C <7.0%  represents  optimal control in non-pregnant diabetic patients.  Different  metrics may apply to specific populations.   Standards of Medical Care in Diabetes - 2016.  For the purpose of screening for the presence of diabetes:  <5.7%     Consistent with the absence of diabetes  5.7-6.4%  Consistent with increasing risk for diabetes   (prediabetes)  >or=6.5%  Consistent with diabetes  Currently no consensus exists for use of hemoglobin A1C  for diagnosis of diabetes for children.     03/31/2015 5.3 4.5 - 6.2 % Final   03/03/2014 5.6 4.5 - 6.2 % Final         Current Outpatient Medications on File Prior to Visit   Medication Sig Dispense Refill    acyclovir (ZOVIRAX) 800 MG Tab Take 1 tablet (800 mg total) by mouth 2 (two) times daily. 180 tablet 6    apixaban (ELIQUIS) 5 mg Tab Take 1 tablet (5 mg total) by mouth 2 (two) times daily. 180 tablet 4    artificial tears (ISOPTO TEARS) 0.5 % ophthalmic solution Place 1 drop into both eyes as needed. (Patient taking differently: Place 1 drop into both eyes as needed (for dry eyes). )      atorvastatin (LIPITOR) 40 MG tablet Take 1 tablet (40 mg total) by mouth once daily. Please make a follow up visit with your doctor for more refills of this medication 90 tablet 0    azelastine (ASTELIN) 137 mcg (0.1 %) nasal spray 1 spray (137 mcg total) by Nasal route 2 (two) times daily. 30 mL 2    clopidogrel (PLAVIX) 75 mg tablet Take 1 tablet (75 mg total) by mouth once daily. 8 pills the first day 38 tablet 11    CYANOCOBALAMIN, VITAMIN B-12, (VITAMIN B-12 ORAL) Take 2,500 mcg by mouth once daily.      fluticasone propionate (FLONASE) 50 mcg/actuation nasal spray 1 spray (50 mcg total) by Each Nostril route once daily. 1 Bottle 0    melatonin 5 mg Tab Take 10 mg by mouth nightly.      metoprolol succinate (TOPROL-XL) 25 MG 24 hr tablet Take 1 tablet (25 mg total) by mouth once daily. 30 tablet 11    multivitamin (MULTIPLE VITAMINS) per tablet Take 1 tablet by mouth once  daily.      nitroGLYCERIN (NITROSTAT) 0.4 MG SL tablet Place 1 tablet (0.4 mg total) under the tongue every 5 (five) minutes as needed for Chest pain. 25 tablet 11    olmesartan (BENICAR) 20 MG tablet Take 1 tablet (20 mg total) by mouth once daily. (replaces ramipril for blood pressure) 90 tablet 1    oxyCODONE-acetaminophen (PERCOCET) 5-325 mg per tablet Take 1 tablet by mouth every 4 to 6 hours as needed for Pain. 20 tablet 0    tamsulosin (FLOMAX) 0.4 mg Cap Take 1 capsule (0.4 mg total) by mouth once daily. 90 capsule 3    venlafaxine (EFFEXOR) 75 MG tablet Take 2 tablets (150 mg total) by mouth 2 (two) times daily. 360 tablet 0    vitamin D 1000 units Tab Take 1 tablet (1,000 Units total) by mouth once daily.      albuterol (PROVENTIL/VENTOLIN HFA) 90 mcg/actuation inhaler Inhale 2 puffs into the lungs every 6 (six) hours as needed for Wheezing. Rescue 18 g 0    furosemide (LASIX) 20 MG tablet Take 1 tablet (20 mg total) by mouth once daily. (Patient taking differently: Take 20 mg by mouth daily as needed. ) 90 tablet 0    gabapentin (NEURONTIN) 300 MG capsule Take 1 capsule (300 mg total) by mouth 2 (two) times daily. (Patient taking differently: Take 300 mg by mouth 2 (two) times daily as needed. ) 60 capsule 11     Current Facility-Administered Medications on File Prior to Visit   Medication Dose Route Frequency Provider Last Rate Last Dose    [DISCONTINUED] 0.9%  NaCl infusion   Intravenous Continuous Benjamin Sandhu  mL/hr at 01/30/20 0700      [DISCONTINUED] 0.9%  NaCl infusion   Intravenous Continuous Miguel Angel Brady  mL/hr at 01/30/20 0847      [DISCONTINUED] acetaminophen tablet 650 mg  650 mg Oral Q4H PRN Miguel Angel Brady MD   650 mg at 01/30/20 0922    [DISCONTINUED] cefazolin (ANCEF) 2 gram in dextrose 5% 50 mL IVPB (premix)    Continuous PRN Miguel Angel Brady  mL/hr at 01/30/20 0821 2 g at 01/30/20 0821    [DISCONTINUED] fentaNYL injection    PRN Benjamin Sandhu MD    "50 mcg at 01/30/20 0828    [DISCONTINUED] heparin (porcine) injection    PRN Benjamin Sandhu MD   15,000 Units at 01/30/20 0801    [DISCONTINUED] HYDROcodone-acetaminophen 5-325 mg per tablet 1 tablet  1 tablet Oral Q4H PRN Miguel Angel Brady MD   1 tablet at 01/30/20 1818    [DISCONTINUED] iohexol (OMNIPAQUE 350) injection    PRN Benjamin Sandhu MD   134 mL at 01/30/20 0835    [DISCONTINUED] lidocaine (PF) 10 mg/ml (1%) injection    PRN Benjamin Sandhu MD   10 mL at 01/30/20 0740    [DISCONTINUED] midazolam injection    PRN Benjamin Sandhu MD   0.5 mg at 01/30/20 0820    [DISCONTINUED] protamine injection    PRN Miguel Angel Brady MD   5 mg at 01/30/20 0827       Allergies   Allergen Reactions    Ciprofloxacin Rash     Diffuse pruritic morbilliform rash developed 3/15/2017 after dose of cipro; previously in 2/2017 he had rash/fevers after initiation of cipro    Zosyn [Piperacillin-Tazobactam] Anaphylaxis     Diffuse pruritic morbilliform rash developed 3/15/2017.  Then, 430am dose on 3/16 and rash worsened with SOB/tachypnea but no hypoxemia.     Bacitracin Itching and Rash     Violaceous rash in area of topical Tx.        Past Surgical History:   Procedure Laterality Date    Cardiac stenting x2      CATARACT EXTRACTION W/  INTRAOCULAR LENS IMPLANT Right 3/29/2016    Dr. Conteh    CATARACT EXTRACTION W/  INTRAOCULAR LENS IMPLANT Left 4/12/2016        EXTERNAL FIXATION TIBIAL FRACTURE Left 07/01/2016    INSERTION OF IMPLANTABLE LOOP RECORDER  04/07/2017    LEFT HEART CATHETERIZATION Left 1/30/2020    Procedure: Left heart cath;  Surgeon: Benjamin Sandhu MD;  Location: [facility] CATH LAB;  Service: Cardiology;  Laterality: Left;  RN PREOP 1/28/20--Pt starting Plavix loading dose today (8pills)- Dr. Sandhu aware.  Pt has a bandaged "non healing area to LLE"--Dr. Sandhu aware.    ORIF TIBIA FRACTURE Left 07/15/2016    RADIOACTIVE SEED IMPLANTATION OF PROSTATE N/A 8/8/2018    Procedure: " INSERTION, RADIOACTIVE SEED, PROSTATE;  Surgeon: Bipin Thompson MD;  Location: [facility] OR [address] [place] [address];  Service: Urology;  Laterality: N/A;  1 hour    Squamous cell cancer removal x3 with Mohs surgery      TONSILLECTOMY      TOTAL KNEE ARTHROPLASTY  10/2012    trus/bx         Family History   Problem Relation Age of Onset    Skin cancer Father     Lung cancer Father     Cancer Father         smoker,     Alzheimer's disease Mother     Hypertension Mother     Cancer Sister         colon, lung cancer     No Known Problems Sister     Cancer Brother         skin cancer, polypectomy     Peripheral vascular disease Unknown     No Known Problems Maternal Aunt     No Known Problems Maternal Uncle     No Known Problems Paternal Aunt     No Known Problems Paternal Uncle     No Known Problems Maternal Grandmother     No Known Problems Maternal Grandfather     No Known Problems Paternal Grandmother     No Known Problems Paternal Grandfather     Melanoma Neg Hx     Psoriasis Neg Hx     Lupus Neg Hx     Eczema Neg Hx     Amblyopia Neg Hx     Blindness Neg Hx     Cataracts Neg Hx     Diabetes Neg Hx     Glaucoma Neg Hx     Macular degeneration Neg Hx     Retinal detachment Neg Hx     Strabismus Neg Hx     Stroke Neg Hx     Thyroid disease Neg Hx     Acne Neg Hx        Social History     Socioeconomic History    Marital status:      Spouse name: Not on file    Number of children: Not on file    Years of education: Not on file    Highest education level: Not on file   Occupational History    Occupation: Retired    Social Needs    Financial resource strain: Not hard at all    Food insecurity:     Worry: Never true     Inability: Never true    Transportation needs:     Medical: No     Non-medical: No   Tobacco Use    Smoking status: Never Smoker    Smokeless tobacco: Never Used   Substance and Sexual Activity    Alcohol use: Yes     Frequency: 2-4 times a month     Drinks per session: 3 or  "4     Binge frequency: Less than monthly     Comment: occasionally, beer    Drug use: No    Sexual activity: Yes   Lifestyle    Physical activity:     Days per week: Not on file     Minutes per session: Not on file    Stress: Not on file   Relationships    Social connections:     Talks on phone: Three times a week     Gets together: Once a week     Attends Denominational service: More than 4 times per year     Active member of club or organization: Yes     Attends meetings of clubs or organizations: More than 4 times per year     Relationship status:    Other Topics Concern    Not on file   Social History Narrative    Not on file       Review of Systems   Constitution: Negative for chills and fever.   Cardiovascular: Negative for claudication and leg swelling.   Respiratory: Negative for cough and shortness of breath.    Skin: Positive for dry skin and poor wound healing. Negative for itching and rash.   Musculoskeletal: Positive for arthritis, joint pain, joint swelling and myalgias. Negative for falls and muscle weakness.   Gastrointestinal: Negative for diarrhea, nausea and vomiting.   Neurological: Positive for paresthesias. Negative for numbness, tremors and weakness.   Psychiatric/Behavioral: Negative for altered mental status and hallucinations.         Objective:       Vitals:    01/29/20 1406   BP: 124/62   Weight: (!) 157 kg (346 lb 2 oz)   Height: 6' 1" (1.854 m)   PainSc: 0-No pain       Physical Exam   Constitutional:  Non-toxic appearance. He does not have a sickly appearance. No distress.   Pt. is well-developed, well-nourished, appears stated age, in no acute distress, alert and oriented x 3. No evidence of depression, anxiety, or agitation. Calm, cooperative, and communicative. Appropriate interactions and affect.   Cardiovascular:   Pulses:       Dorsalis pedis pulses are 2+ on the right side, and 2+ on the left side.        Posterior tibial pulses are 1+ on the right side, and 1+ on the " left side.   There is decreased digital hair. Skin is atrophic, slightly hyperpigmented, and pitting edema nora (L>>R)     Pulmonary/Chest: No respiratory distress.   Musculoskeletal:        Right ankle: He exhibits swelling. No tenderness. No lateral malleolus, no medial malleolus, no AITFL, no CF ligament, no head of 5th metatarsal and no proximal fibula tenderness found. Achilles tendon exhibits no pain, no defect and normal Zabala's test results.        Left ankle: He exhibits swelling. No tenderness. No lateral malleolus, no medial malleolus, no AITFL, no CF ligament and no posterior TFL tenderness found. Achilles tendon exhibits no pain, no defect and normal Zabala's test results.        Right foot: There is no tenderness and no bony tenderness.        Left foot: There is no tenderness and no bony tenderness.   Decreased stride, station of gait.  apropulsive toe off.  Increased angle and base of gait.    Patient has hammertoes of digits 2-5 bilateral partially reducible without symptom today.     There is equinus deformity bilateral. There is limitation of dorsiflexion with knees extended and with knees flexed.    Shoes reveals lateral heel counter wear bilateral in athletic shoes.        Lymphadenopathy:   No lymphatic streaking    Negative lymphadenopathy bilateral popliteal fossa and tarsal tunnel.     Neurological:   [place] 5.07 monofilament is intact bilateral feet. Sharp/dull sensation is also intact Bilateral feet. Proprioception is grossly intact. Vibratory sensation intact (pt able to sense vibration stop within 3-5 seconds)     Skin: Skin is warm and dry. Abrasion (left anterior leg as pictured, to level of subQ. local edema.  Granular base.  no fluctuance, no crepitus, no purulence) and lesion noted. No bruising, no burn, no laceration and no rash noted. He is not diaphoretic. No cyanosis or erythema. No pallor. Nails show no clubbing.   Ulcer location: medial left ankle  Signs of  infection: none  Drainage: none  Purulence: no  Crepitus/fluctuance: no  Periwound: Reddened  Base: thin epithelial itssue  Depth: skin  Probe to bone: no      Ulcer location: posterior left heel  Signs of infection:none  Drainage:  None  Periwound: intact  Base: thin epithelial with bleeding within layers     Psychiatric: His mood appears not anxious. His affect is not inappropriate. His speech is not slurred. He is not combative. He is communicative. He is attentive.   Nursing note reviewed.    1/31/20 01/21/20 01/14/20 01/07/20 12/31/19: Epic unable to load pic.     12/19/2019          12/10/19          11/14/19          Assessment:       Encounter Diagnoses   Name Primary?    PVD (peripheral vascular disease) Yes    Venous stasis ulcer of left lower leg with edema of left lower leg          Plan:       Janusz was seen today for wound check.    Diagnoses and all orders for this visit:    PVD (peripheral vascular disease)    Venous stasis ulcer of left lower leg with edema of left lower leg      I counseled the patient on his conditions, their implications and medical management.    The site is cleansed of foreign material as much as possible. The patient is alerted to watch for any signs of infection (redness, pus, pain, increased swelling or fever) and call if such occurs.    Wound is improved, without signs of infection    Fabienne and coflex wrap applied.   Detailed home care instructions dispensed.     Patient is to elevate legs. When sleeping, place a pillow under lower extremities. When sitting, support the legs so that they are level with the waist.    Follow-up:Patient is to return to the clinic in 1 weeks for follow-up but should call [facility] immediately if any signs of infection, such as fever, chills, sweats, increased redness or pain.    If wound does not improve he may require biopsy given his history of multiple skin cancers.  Discussed this with patient  and he agrees.-- Declines biopsy today     Short-term goals include maintaining good offloading and minimizing bioburden, promoting granulation and epithelialization to healing.  Long-term goals include keeping the wound healed by good offloading and medical management under the direction of internist.

## 2020-01-31 NOTE — PLAN OF CARE
Patient with no hematoma, no bleeding at site, hob to 45 degrees for 1/2 hour then 90 degrees for half hour. No bleeding or hematoma

## 2020-01-31 NOTE — PLAN OF CARE
Dermabond placed on puncture site then covered with gauze and tegaderm. Bedrest continues as ordered.

## 2020-01-31 NOTE — PLAN OF CARE
Spoke to Dr Brady regarding the continued oozing,steady dripping of blood from right groin puncture site. New orders received, Pressure held x 45 minutes.

## 2020-01-31 NOTE — PLAN OF CARE
Pt verbalized readiness to go home and understanding of discharge instructions. Reviewed plan of care. Questions encouraged questions answered. Hemostasis maintained after ambulation to restroom and in hallways of [facility].

## 2020-01-31 NOTE — DISCHARGE INSTRUCTIONS
ANGIOGRAM INSTRUCTIONS                                           Drink plenty of fluids for the next 48 hours and follow your doctor's diet orders.    Rest for the next 72 hours.   Try not to keep the injected leg bent for a long period of time.  Remove the dressing in 24 hours, and you may shower. Clean the area with soap and water, and apply a band aid for the next 5 days.                                                                 No  Lifting over 5-10 lbs., that is, not more than 1 gallon of water, or straining for 72 hours.    No driving, no drinking alcohol, and no signing legal documents for the next 24 hours.    Your last pain pill was given at 6:15 pm     Call your doctor for elevated temperature, shortness of breath, chest pain, or cold discolored foot or leg.    If oozing occurs at the injections site, lie down.  Apply pressure with a clean wash cloth for 20 to 30 minutes and call your doctor.    If severe bleeding occurs, lie down, apply pressure.  Call 911 and request an ambulance to take you to the nearest hospital emergency room.    Continue to take your regular medications as instructed.      Follow the instructions in the handout given to you.              Vascular Closure Device     -Re apply a clean, dry band aid and every day for five days or until a scab has formed at the site.  Change the band aid as needed  -Keep the site dry and clean.  -You may shower 24 hours after the procedure, but do not bathe or use a pool until the wound had completely closed.  -Gently clean your puncture site with soap and warm water.  -After showering , gently pat-dry the site with a clean towel; then let the site air dry before covering with a band aid  -Limit tight fitting clothes or underwear that my irritate the puncture site until the site has healed.    Daily Activities    Do not drive, drink alcohol, or sign legal documents for 24 hours, or if taking narcotic pain medication.    Day of discharge  -No  driving  Modify activity for 3-5 days  -No heavy lifting of anything over 5 pounds  (equivalent to a 1/2 gallon of milk)  -No pushing or pulling.  -No vigorous activity or straining  -Avoid stairs unless necessary : if necessary, take them slowly.  -Coughing, sneezing, or straining for a bowel movement:  Support your groin by pressing with your palm on top of the dressing/bandage.  -Sexual activity : check with your doctor  -No strenuous exercise.  -Avoid driving unless necessary.  Talk to your doctor about returning back to work, which depends on your type of work., your procedure and any medication you might be taking.          Discharge Instructions for Cardiac Catheterization  Cardiac catheterization is a procedure to look for blocked areas in the blood vessels that send blood to the heart. A thin, flexible tube (catheter) is put in a blood vessel in your groin or arm. The healthcare provider injects contrast fluid into your blood, which then flows to your heart. X-rays pictures are taken of your heart. Your provider will review the results with you. Be sure to ask any questions you have before you leave. This sheet will help you take care of yourself at home.  Home care  · Only do light and easy activities for the next 2 to 3 days. Ask for help with chores and errands while you recover. Have someone drive you to your appointments.  · Don't lift anything heavy for a while. Your healthcare team will tell you when it's safe to lift again.  · Ask your healthcare team when you can expect to return to work. Unless your job involves lifting, you may be able to return to your normal activities within a couple of days.  · Take your medicines as directed. Don't skip doses.  · Drink 6 to 8 glasses of water a day. This is to help flush the contrast dye out of your body. Call your healthcare team if your urine has any change in color.  · Take your temperature each day for 7 days. If you feel cold and clammy or start  sweating, take your temperature right away and call your healthcare team.  · Check your incisions every day for signs of infection. These include redness, swelling, and drainage. It is normal to have a small bruise or bump where the catheter was inserted. A bruise that is getting larger is not normal and should be reported to your healthcare team. If you see blood forming in the incision, call your healthcare team. Go to the emergency department if you have uncontrolled bleeding from the artery site. This is especially true if you take medicines that make it difficult for your blood to clot. Examples are aspirin, clopidogrel, and warfarin.  · Eat a healthy diet. Make sure it is low in fat, salt, and cholesterol. Ask your healthcare team for diet information.  · Stop smoking. Enroll in a stop-smoking program or ask your healthcare team for help. Stop-smoking programs can be life saving.  · Exercise as your healthcare team tells you to. Your healthcare team may recommend you start a cardiac rehabilitation program. Cardiac rehab is an exercise program in which trained healthcare staff watch your progress and stress on your heart while you exercise. Ask your team how to enroll.  · Don't swim or take baths until your healthcare team says its OK. You can shower the day after the procedure. Keep the site clean and dry. This keeps the incision from getting wet and infected until the skin and artery can heal.  Follow-up care  · Make a follow-up appointment as advised by our staff. It's common to have a follow-up appointment 2 to 4 weeks after an angioplasty or coronary stent procedure.  · Make a yearly appointment, too. This is to make sure you are still doing well and not having any new symptoms.  · Don't wait for a follow-up appointment if your medicines aren't working or you are having heart-related symptoms.  When to seek medical care  Call your healthcare provider right away if you have any of the following:  · Chest  pain  · Constant or increasing pain or numbness in your leg  · Fever of 100.4°F (38.0°C) or higher, or as directed by your healthcare provider  · Symptoms of infection. These include redness, swelling, drainage, or warmth at the incision site.  · Shortness of breath  · A leg that feels cold or appears blue  · Bleeding, bruising, or a lot of swelling where the catheter was inserted  · Blood in your urine  · Black or tarry stools  · Any unusual bleeding   Date Last Reviewed: 10/1/2016  © 2973-0289 [employer]. [address] [place] [address]. All rights reserved. This information is not intended as a substitute for professional medical care. Always follow your healthcare professional's instructions.    Fall Prevention  Millions of people fall every year and injure themselves. You may have had anesthesia or sedation which may increase your risk of falling. You may have health issues that put you at an increased risk of falling.     Here are ways to reduce your risk of falling.  ·   · Make your home safe by keeping walkways clear of objects you may trip over.  · Use non-slip pads under rugs. Do not use area rugs or small throw rugs.  · Use non-slip mats in bathtubs and showers.  · Install handrails and lights on staircases.  · Do not walk in poorly lit areas.  · Do not stand on chairs or wobbly ladders.  · Use caution when reaching overhead or looking upward. This position can cause a loss of balance.  · Be sure your shoes fit properly, have non-slip bottoms and are in good condition.   · Wear shoes both inside and out. Avoid going barefoot or wearing slippers.  · Be cautious when going up and down stairs, curbs, and when walking on uneven sidewalks.  · If your balance is poor, consider using a cane or walker.  · If your fall was related to alcohol use, stop or limit alcohol intake.   · If your fall was related to use of sleeping medicines, talk to your doctor about this. You may need to reduce  your dosage at bedtime if you awaken during the night to go to the bathroom.    · To reduce the need for nighttime bathroom trips:  ¨ Avoid drinking fluids for several hours before going to bed  ¨ Empty your bladder before going to bed  ¨ Men can keep a urinal at the bedside  · Stay as active as you can. Balance, flexibility, strength, and endurance all come from exercise. They all play a role in preventing falls. Ask your healthcare provider which types of activity are right for you.  · Get your vision checked on a regular basis.  · If you have pets, know where they are before you stand up or walk so you don't trip over them.  · Use night lights.

## 2020-02-01 ENCOUNTER — PATIENT MESSAGE (OUTPATIENT)
Dept: ADMINISTRATIVE | Facility: OTHER | Age: 75
End: 2020-02-01

## 2020-02-03 ENCOUNTER — TELEPHONE (OUTPATIENT)
Dept: PODIATRY | Facility: CLINIC | Age: 75
End: 2020-02-03

## 2020-02-03 ENCOUNTER — PATIENT OUTREACH (OUTPATIENT)
Dept: ADMINISTRATIVE | Facility: HOSPITAL | Age: 75
End: 2020-02-03

## 2020-02-03 ENCOUNTER — PATIENT OUTREACH (OUTPATIENT)
Dept: ADMINISTRATIVE | Facility: OTHER | Age: 75
End: 2020-02-03

## 2020-02-03 NOTE — TELEPHONE ENCOUNTER
----- Message from Bri Galloway sent at 2/3/2020  8:17 AM CST -----  Type:  Sooner Appointment Request    Patient is requesting a sooner appointment.  Patient declined first available appointment listed as well as another facility and provider .  Patient will not accept being placed on the waitlist and is requesting a message be sent to doctor.    Name of Caller: spouse/ Bri    When is the first available appointment? 02/20    Symptoms: spot on foot    Would the patient rather a call back or a response via [facility]? Call back    Best Call Back Number: [phone]    Additional Information:

## 2020-02-03 NOTE — PROGRESS NOTES
LINKS immunization registry, Care Everywhere and Health Maintenance updated.  Chart reviewed for overdue Proactive [facility] health maintenance testing.  Patient has open case request for colonoscopy[y.  [facility] order not entered

## 2020-02-04 ENCOUNTER — OFFICE VISIT (OUTPATIENT)
Dept: CARDIOLOGY | Facility: CLINIC | Age: 75
End: 2020-02-04
Payer: MEDICARE

## 2020-02-04 VITALS
HEART RATE: 86 BPM | OXYGEN SATURATION: 94 % | WEIGHT: 315 LBS | BODY MASS INDEX: 45.96 KG/M2 | SYSTOLIC BLOOD PRESSURE: 133 MMHG | DIASTOLIC BLOOD PRESSURE: 61 MMHG | RESPIRATION RATE: 16 BRPM

## 2020-02-04 DIAGNOSIS — R60.9 EDEMA, UNSPECIFIED TYPE: ICD-10-CM

## 2020-02-04 DIAGNOSIS — I25.118 CORONARY ARTERY DISEASE OF NATIVE ARTERY OF NATIVE HEART WITH STABLE ANGINA PECTORIS: Primary | ICD-10-CM

## 2020-02-04 DIAGNOSIS — I25.10 CORONARY ARTERY DISEASE, ANGINA PRESENCE UNSPECIFIED, UNSPECIFIED VESSEL OR LESION TYPE, UNSPECIFIED WHETHER NATIVE OR TRANSPLANTED HEART: ICD-10-CM

## 2020-02-04 PROCEDURE — 1101F PT FALLS ASSESS-DOCD LE1/YR: CPT | Mod: HCNC,CPTII,S$GLB, | Performed by: INTERNAL MEDICINE

## 2020-02-04 PROCEDURE — 3075F PR MOST RECENT SYSTOLIC BLOOD PRESS GE 130-139MM HG: ICD-10-PCS | Mod: HCNC,CPTII,S$GLB, | Performed by: INTERNAL MEDICINE

## 2020-02-04 PROCEDURE — 99215 PR OFFICE/OUTPT VISIT, EST, LEVL V, 40-54 MIN: ICD-10-PCS | Mod: HCNC,S$GLB,, | Performed by: INTERNAL MEDICINE

## 2020-02-04 PROCEDURE — 1101F PR PT FALLS ASSESS DOC 0-1 FALLS W/OUT INJ PAST YR: ICD-10-PCS | Mod: HCNC,CPTII,S$GLB, | Performed by: INTERNAL MEDICINE

## 2020-02-04 PROCEDURE — 3078F PR MOST RECENT DIASTOLIC BLOOD PRESSURE < 80 MM HG: ICD-10-PCS | Mod: HCNC,CPTII,S$GLB, | Performed by: INTERNAL MEDICINE

## 2020-02-04 PROCEDURE — 1126F AMNT PAIN NOTED NONE PRSNT: CPT | Mod: HCNC,S$GLB,, | Performed by: INTERNAL MEDICINE

## 2020-02-04 PROCEDURE — 1159F PR MEDICATION LIST DOCUMENTED IN MEDICAL RECORD: ICD-10-PCS | Mod: HCNC,S$GLB,, | Performed by: INTERNAL MEDICINE

## 2020-02-04 PROCEDURE — 99215 OFFICE O/P EST HI 40 MIN: CPT | Mod: HCNC,S$GLB,, | Performed by: INTERNAL MEDICINE

## 2020-02-04 PROCEDURE — 3078F DIAST BP <80 MM HG: CPT | Mod: HCNC,CPTII,S$GLB, | Performed by: INTERNAL MEDICINE

## 2020-02-04 PROCEDURE — 1126F PR PAIN SEVERITY QUANTIFIED, NO PAIN PRESENT: ICD-10-PCS | Mod: HCNC,S$GLB,, | Performed by: INTERNAL MEDICINE

## 2020-02-04 PROCEDURE — 99999 PR PBB SHADOW E&M-EST. PATIENT-LVL III: ICD-10-PCS | Mod: PBBFAC,HCNC,, | Performed by: INTERNAL MEDICINE

## 2020-02-04 PROCEDURE — 99999 PR PBB SHADOW E&M-EST. PATIENT-LVL III: CPT | Mod: PBBFAC,HCNC,, | Performed by: INTERNAL MEDICINE

## 2020-02-04 PROCEDURE — 1159F MED LIST DOCD IN RCRD: CPT | Mod: HCNC,S$GLB,, | Performed by: INTERNAL MEDICINE

## 2020-02-04 PROCEDURE — 3075F SYST BP GE 130 - 139MM HG: CPT | Mod: HCNC,CPTII,S$GLB, | Performed by: INTERNAL MEDICINE

## 2020-02-04 RX ORDER — FUROSEMIDE 20 MG/1
20 TABLET ORAL DAILY
Qty: 90 TABLET | Refills: 0 | Status: SHIPPED | OUTPATIENT
Start: 2020-02-04 | End: 2020-05-25

## 2020-02-04 RX ORDER — SODIUM CHLORIDE 9 MG/ML
INJECTION, SOLUTION INTRAVENOUS CONTINUOUS
Status: CANCELLED | OUTPATIENT
Start: 2020-02-04

## 2020-02-04 NOTE — PROGRESS NOTES
CARDIOVASCULAR CONSULTATION    REASON FOR CONSULT:   Janusz Montgomery Jr. is a 75 y.o. male who presents for EVALUATION of PCI.      HISTORY OF PRESENT ILLNESS:     Patient is a pleasant 75-year-old man.  Past medical history significant for morbid obesity and multiple other, but 80s including coronary artery disease.  Was having dyspnea on exertion as well as chest tightness on exertion.  Underwent coronary angiogram by Dr. Sandhu.  On the coronary angiogram was noted to have his distal left main disease as well as ostial LAD 90% stenosis.  IVUS was performed during that cardiac catheterization he is here is intact family to discuss revascularization options.  Main complaint is significant dyspnea on exertion as well as chest tightness whenever he exerts himself.  Was started on Imdur, but started having horrible headaches with the Imdur and had to be stopped.  Denies any resting chest pains.    Cardiac catheterization January 2020:    There is a distal left main 50% stenosis, extending into ostial LAD which is 90% stenosed.  IVUS was performed on left main, lad.  Then the wire was redirected from the left main into the circumflex and IVUS was performed on the left main and proximal circumflex also.     IVUS images reviewed and revealed block extending from distal left main into the ostial LAD.  Not much block per done in ostial circumflex.  ML a and left main is 7.8 mm2.     Assessment and plan     CT surgery consultation.     If patient not a candidate for CT surgery, then PCI from left main into ostial LAD.  Provisional PCI of circumflex if required due to plaque shift into circumflex.     Aspirin 81 mg daily     Plavix 75 mg daily     Follow-up in Cardiology Clinic      · 50% distal LM extending into ostial LAD  · 80% ostial LAD stenosis  · Patent mid RCA stent with 20% in stent restenosis  · LVEDP (Pre): 20        · Normal left ventricular systolic function. The estimated ejection fraction is 60%  · No wall  "motion abnormalities.  · Moderate eccentric left ventricular hypertrophy.  · Mild left ventricular enlargement.  · Normal right ventricular systolic function.  · The sinuses of Valsalva is mildly dilated. 3.8cm.  · Mild aortic regurgitation.  · The estimated PA systolic pressure is 33 mm Hg        PAST MEDICAL HISTORY:     Past Medical History:   Diagnosis Date    NAT (acute kidney injury) 3/19/2017    ALLERGIC RHINITIS     Anemia     Anxiety     Basal cell carcinoma 10/19/2018    forehead and right medial shoulder    Basal cell carcinoma 01/09/2019    left nasal bridge and left posterior ear    Chronic rhinitis 5/3/2013    Chronic rhinitis 5/3/2013    Coronary artery disease involving native coronary artery of native heart without angina pectoris s/p RCA stent     Cortical cataract of both eyes 3/18/2016    Delayed sleep phase syndrome 3/13/2019    Depression     Erectile dysfunction 3/24/2014    Erectile dysfunction 3/24/2014    Essential hypertension     GERD (gastroesophageal reflux disease) 7/25/2012    Gout, arthritis     Grade III open fracture of left tibia and fibula s/p ex-fix on 7/1/16 and ORIF of left tibia on 7/15 7/6/2016    H/O: iritis     Helicobacter pylori (H. pylori) infection     Treated    Herpes simplex keratoconjunctivitis 9/30/2015    - on acyclovir - followed by opthalmology, Dr. Uribe     Herpes simplex keratoconjunctivitis 9/30/2015    - on acyclovir - followed by opthalmology, Dr. Uribe     Hyperkalemia 2/28/2017    Hyperlipidemia     Hypogonadism male     Hypogonadism male     Mixed anxiety and depressive disorder     Morbid obesity     Obstructive sleep apnea on CPAP     CPAP    Osteoarthritis of left knee 7/25/2012    Paroxysmal atrial fibrillation 7/6/2016    Primary osteoarthritis of left knee 7/25/2012    Prominent aorta 1/25/2016    "RESULTS: THE HEART IS MILDLY ENLARGED WITH A SLIGHTLY PROMINENT AORTA" - Xray Chest PA & Lateral 12-     " "Prostate cancer 2/15/2016    - followed by urology, Dr. Young     Prostate cancer 2/15/2016    - followed by urology, Dr. 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 Conteh    CATARACT EXTRACTION W/  INTRAOCULAR LENS IMPLANT Left 4/12/2016        EXTERNAL FIXATION TIBIAL FRACTURE Left 07/01/2016    INSERTION OF IMPLANTABLE LOOP RECORDER  04/07/2017    LEFT HEART CATHETERIZATION Left 1/30/2020    Procedure: Left heart cath;  Surgeon: Benjamin Sandhu MD;  Location: [facility] CATH LAB;  Service: Cardiology;  Laterality: Left;  RN PREOP 1/28/20--Pt starting Plavix loading dose today (8pills)- Dr. Sandhu aware.  Pt has a bandaged "non healing area to LLE"--Dr. Sandhu aware.    ORIF TIBIA FRACTURE Left 07/15/2016    RADIOACTIVE SEED IMPLANTATION OF PROSTATE N/A 8/8/2018    Procedure: INSERTION, RADIOACTIVE SEED, PROSTATE;  Surgeon: Bipin Thompson MD;  Location: [address];  Service: Urology;  Laterality: N/A;  1 hour    Squamous cell cancer removal x3 with Mohs surgery      TONSILLECTOMY      TOTAL KNEE ARTHROPLASTY  10/2012    trus/bx         ALLERGIES AND MEDICATION:     Review of patient's allergies indicates:   Allergen Reactions    Ciprofloxacin Rash     Diffuse pruritic morbilliform rash developed 3/15/2017 after dose of cipro; previously in 2/2017 he had rash/fevers after initiation of " cipro    Zosyn [piperacillin-tazobactam] Rash     Diffuse pruritic morbilliform rash developed 3/15/2017.  Then, 430am dose on 3/16 and rash worsened with SOB/tachypnea but no hypoxemia.     Bacitracin Itching and Rash     Violaceous rash in area of topical Tx.         Medication List           Accurate as of February 4, 2020  2:48 PM. If you have any questions, ask your nurse or doctor.               CHANGE how you take these medications    artificial tears 0.5 % ophthalmic solution  Commonly known as:  ISOPTO TEARS  Place 1 drop into both eyes as needed.  What changed:  reasons to take this     furosemide 20 MG tablet  Commonly known as:  LASIX  Take 1 tablet (20 mg total) by mouth once daily.  What changed:    · when to take this  · reasons to take this     gabapentin 300 MG capsule  Commonly known as:  NEURONTIN  Take 1 capsule (300 mg total) by mouth 2 (two) times daily.  What changed:    · when to take this  · reasons to take this        CONTINUE taking these medications    acyclovir 800 MG Tab  Commonly known as:  ZOVIRAX  Take 1 tablet (800 mg total) by mouth 2 (two) times daily.     albuterol 90 mcg/actuation inhaler  Commonly known as:  PROVENTIL/VENTOLIN HFA  Inhale 2 puffs into the lungs every 6 (six) hours as needed for Wheezing. Rescue     apixaban 5 mg Tab  Commonly known as:  ELIQUIS  Take 1 tablet (5 mg total) by mouth 2 (two) times daily.     atorvastatin 40 MG tablet  Commonly known as:  LIPITOR  Take 1 tablet (40 mg total) by mouth once daily. Please make a follow up visit with your doctor for more refills of this medication     azelastine 137 mcg (0.1 %) nasal spray  Commonly known as:  ASTELIN  1 spray (137 mcg total) by Nasal route 2 (two) times daily.     clopidogreL 75 mg tablet  Commonly known as:  PLAVIX  Take 1 tablet (75 mg total) by mouth once daily. 8 pills the first day     fluticasone propionate 50 mcg/actuation nasal spray  Commonly known as:  FLONASE  1 spray (50 mcg total) by  Each Nostril route once daily.     melatonin  Commonly known as:  MELATIN     metoprolol succinate 25 MG 24 hr tablet  Commonly known as:  TOPROL-XL  Take 1 tablet (25 mg total) by mouth once daily.     Multiple Vitamins per tablet  Generic drug:  multivitamin     nitroGLYCERIN 0.4 MG SL tablet  Commonly known as:  NITROSTAT  Place 1 tablet (0.4 mg total) under the tongue every 5 (five) minutes as needed for Chest pain.     olmesartan 20 MG tablet  Commonly known as:  BENICAR  Take 1 tablet (20 mg total) by mouth once daily. (replaces ramipril for blood pressure)     oxyCODONE-acetaminophen 5-325 mg per tablet  Commonly known as:  Percocet  Take 1 tablet by mouth every 4 to 6 hours as needed for Pain.     tamsulosin 0.4 mg Cap  Commonly known as:  FLOMAX  Take 1 capsule (0.4 mg total) by mouth once daily.     venlafaxine 75 MG tablet  Commonly known as:  EFFEXOR  Take 2 tablets (150 mg total) by mouth 2 (two) times daily.     VITAMIN B-12 ORAL     vitamin D 1000 units Tab  Commonly known as:  VITAMIN D3  Take 1 tablet (1,000 Units total) by mouth once daily.           Where to Get Your Medications      These medications were sent to [facility] [place] [address] - [address] [address]    Phone:  [phone]   · furosemide 20 MG tablet         SOCIAL HISTORY:     Social History     Socioeconomic History    Marital status:      Spouse name: Not on file    Number of children: Not on file    Years of education: Not on file    Highest education level: Not on file   Occupational History    Occupation: Retired    Social Needs    Financial resource strain: Not hard at all    Food insecurity:     Worry: Never true     Inability: Never true    Transportation needs:     Medical: No     Non-medical: No   Tobacco Use    Smoking status: Never Smoker    Smokeless tobacco: Never Used   Substance and Sexual Activity    Alcohol use: Yes     Frequency: 2-4 times a  month     Drinks per session: 3 or 4     Binge frequency: Less than monthly     Comment: occasionally, beer    Drug use: No    Sexual activity: Yes   Lifestyle    Physical activity:     Days per week: Not on file     Minutes per session: Not on file    Stress: Not on file   Relationships    Social connections:     Talks on phone: Three times a week     Gets together: Once a week     Attends [employer] service: More than 4 times per year     Active member of club or organization: Yes     Attends meetings of clubs or organizations: More than 4 times per year     Relationship status:    Other Topics Concern    Not on file   Social History Narrative    Not on file       FAMILY HISTORY:     Family History   Problem Relation Age of Onset    Skin cancer Father     Lung cancer Father     Cancer Father         smoker,     Alzheimer's disease Mother     Hypertension Mother     Cancer Sister         colon, lung cancer     No Known Problems Sister     Cancer Brother         skin cancer, polypectomy     Peripheral vascular disease Unknown     No Known Problems Maternal Aunt     No Known Problems Maternal Uncle     No Known Problems Paternal Aunt     No Known Problems Paternal Uncle     No Known Problems Maternal Grandmother     No Known Problems Maternal Grandfather     No Known Problems Paternal Grandmother     No Known Problems Paternal Grandfather     Melanoma Neg Hx     Psoriasis Neg Hx     Lupus Neg Hx     Eczema Neg Hx     Amblyopia Neg Hx     Blindness Neg Hx     Cataracts Neg Hx     Diabetes Neg Hx     Glaucoma Neg Hx     Macular degeneration Neg Hx     Retinal detachment Neg Hx     Strabismus Neg Hx     Stroke Neg Hx     Thyroid disease Neg Hx     Acne Neg Hx        REVIEW OF SYSTEMS:   Review of Systems   Constitution: Negative.   HENT: Negative.    Eyes: Negative.    Cardiovascular: Positive for chest pain and dyspnea on exertion.   Respiratory: Negative.    Endocrine:  Negative.    Hematologic/Lymphatic: Negative.    Skin: Negative.    Musculoskeletal: Negative.    Gastrointestinal: Negative.    Genitourinary: Negative.    Neurological: Negative.    Psychiatric/Behavioral: Negative.    Allergic/Immunologic: Negative.        A 10 point review of systems was performed and all the pertinent positives have been mentioned. Rest of review of systems was negative.        PHYSICAL EXAM:     Vitals:    02/04/20 1359   BP: 133/61   Pulse: 86   Resp: 16    Body mass index is 45.96 kg/m².  Weight: (!) 158 kg (348 lb 5.2 oz)         Physical Exam   Constitutional: He is oriented to person, place, and time. He appears well-developed and well-nourished.   HENT:   Head: Normocephalic.   Eyes: Pupils are equal, round, and reactive to light. Conjunctivae are normal.   Neck: Normal range of motion. Neck supple.   Cardiovascular: Normal rate, regular rhythm and normal heart sounds.   Pulmonary/Chest: Effort normal and breath sounds normal.   Abdominal: Soft. Bowel sounds are normal.   Neurological: He is alert and oriented to person, place, and time.   Skin: Skin is warm.   Vitals reviewed.        DATA:     Laboratory:  CBC:  Recent Labs   Lab 12/12/17  1318 09/26/18  0937 01/28/20  1508   WBC 10.25 12.14 10.17   Hemoglobin 12.7 L 12.7 L 11.5 L   Hematocrit 38.3 L 38.9 L 34.5 L   Platelets 163 173 175       CHEMISTRIES:  Recent Labs   Lab 03/22/17  0400 03/23/17  0452  05/03/18  1346 07/26/18  1442 01/28/20  1508   Glucose 102 111 H   < > 105 109 98   Sodium 140 139   < > 139 140 141   Potassium 4.3 3.9   < > 4.8 4.5 4.5   BUN, Bld 20 18   < > 21 16 18   Creatinine 1.7 H 1.7 H   < > 1.2 1.2 1.2   eGFR if  46.2 A 46.2 A   < > >60 >60.0 >60   eGFR if non  40.0 A 40.0 A   < > >60 >60.0 60   Calcium 8.4 L 9.0   < > 9.3 9.3 8.6 L   Magnesium 1.8 1.9  --  2.3  --   --     < > = values in this interval not displayed.       CARDIAC BIOMARKERS:  Recent Labs   Lab 03/17/17  0804    CPK 48       COAGS:  Recent Labs   Lab 02/24/17  1335 01/28/20  1508   INR 1.0 1.0       LIPIDS/LFTS:  Recent Labs   Lab 04/25/17  0816  07/07/17  1306 12/12/17  1318 05/03/18  1346 11/28/18  1356   Cholesterol 133  --   --  146  --  150   Triglycerides 137  --   --  152 H  --  149   HDL 23 L  --   --  32 L  --  30 L   LDL Cholesterol 82.6  --   --  83.6  --  90.2   Non-HDL Cholesterol 110  --   --  114  --  120   AST  --    < > 14 20 14  --    ALT  --    < > 18 29 20  --     < > = values in this interval not displayed.       Hemoglobin A1C   Date Value Ref Range Status   07/11/2016 5.3 4.5 - 6.2 % Final     Comment:     According to ADA guidelines, hemoglobin A1C <7.0% represents  optimal control in non-pregnant diabetic patients.  Different  metrics may apply to specific populations.   Standards of Medical Care in Diabetes - 2016.  For the purpose of screening for the presence of diabetes:  <5.7%     Consistent with the absence of diabetes  5.7-6.4%  Consistent with increasing risk for diabetes   (prediabetes)  >or=6.5%  Consistent with diabetes  Currently no consensus exists for use of hemoglobin A1C  for diagnosis of diabetes for children.     03/31/2015 5.3 4.5 - 6.2 % Final   03/03/2014 5.6 4.5 - 6.2 % Final       TSH  Recent Labs   Lab 03/18/17  0508   TSH 6.157 H       The 10-year ASCVD risk score (Yannick MURRAY Jr., et al., 2013) is: 28.7%    Values used to calculate the score:      Age: 75 years      Sex: Male      Is Non- : No      Diabetic: No      Tobacco smoker: No      Systolic Blood Pressure: 133 mmHg      Is BP treated: Yes      HDL Cholesterol: 30 mg/dL      Total Cholesterol: 150 mg/dL             ASSESSMENT AND PLAN     Patient Active Problem List   Diagnosis    Essential hypertension    Hyperlipidemia    Coronary artery disease involving native coronary artery of native heart without angina pectoris s/p RCA stent    Obstructive sleep apnea    GERD (gastroesophageal  reflux disease)    Benign prostatic hyperplasia with urinary obstruction    Morbid obesity with body mass index of 45.0-49.9 in adult    Prostate cancer    Paroxysmal atrial fibrillation     Major depressive disorder, recurrent episode, mild    Generalized anxiety disorder    History of gout    Herpes simplex keratoconjunctivitis    Decreased range of motion of left ankle    Right leg weakness    Impaired mobility    Balance problems    PVD (peripheral vascular disease)    Bilateral leg edema    Bilateral carotid artery stenosis    Gait abnormality    Left leg weakness    Delayed sleep phase syndrome    Floppy eyelid syndrome    Dyspnea    Insomnia    Restrictive lung disease    Personal history of asbestosis    Chronic ulcer of left ankle limited to breakdown of skin    Chest pain, atypical         1.  Patient with ostial severe LAD disease, moderate distal left main LAD disease.  Here to discuss various options.  I discussed his case with  at the [facility] to see if he would be a good candidate for LIMA to LAD.  Per CT surgery, he would be very high risk for CT surgery because of his morbid obesity and other comorbidities.  Had a detailed discussion about risks benefits of medical management versus PCI.  After detailed discussion the patient and the family agrees for revascularization percutaneous approach.    Risks, benefits and alternatives of the catheterization procedure were discussed with the patient.The risks of coronary angiography include but are not limited to: bleeding, infection, death, heart attack, arrhythmia, kidney injury or failure, potential need for dialysis, allergic reactions, stroke, need for emergency surgery, hematoma, pseudoaneurysm etc.  Should stenting be indicated, the patient has agreed to dual anti-platelet therapy for 1-consecutive year with a drug-eluting stent and a minimum of 1-month with the use of a bare metal stent. Additionally, pt is aware  that non-compliance is likely to result in stent clotting with heart attack, heart failure, and/or death  The risks of moderate sedation include hypotension, respiratory depression, arrhythmias, bronchospasm, and death. Informed consent was obtained and the  patient is agreeable to proceed with the procedure. Consent was placed on the chart.    Patient has been asked to hold his Eliquis 2 days prior to the procedure.  He has been asked to start taking his aspirin when he holds the Eliquis.        Thank you very much for involving me in the care of your patient.  Please do not hesitate to contact me if there are any questions.      Miguel Angel Brady MD, FACC, [facility]  Interventional Cardiologist, [facility].           This note was dictated with the help of speech recognition software.  There might be un-intended errors and/or substitutions.

## 2020-02-04 NOTE — H&P (VIEW-ONLY)
CARDIOVASCULAR CONSULTATION    REASON FOR CONSULT:   Janusz Montgomery Jr. is a 75 y.o. male who presents for EVALUATION of PCI.      HISTORY OF PRESENT ILLNESS:     Patient is a pleasant 75-year-old man.  Past medical history significant for morbid obesity and multiple other, but 80s including coronary artery disease.  Was having dyspnea on exertion as well as chest tightness on exertion.  Underwent coronary angiogram by Dr. Sandhu.  On the coronary angiogram was noted to have his distal left main disease as well as ostial LAD 90% stenosis.  IVUS was performed during that cardiac catheterization he is here is intact family to discuss revascularization options.  Main complaint is significant dyspnea on exertion as well as chest tightness whenever he exerts himself.  Was started on Imdur, but started having horrible headaches with the Imdur and had to be stopped.  Denies any resting chest pains.    Cardiac catheterization January 2020:    There is a distal left main 50% stenosis, extending into ostial LAD which is 90% stenosed.  IVUS was performed on left main, lad.  Then the wire was redirected from the left main into the circumflex and IVUS was performed on the left main and proximal circumflex also.     IVUS images reviewed and revealed block extending from distal left main into the ostial LAD.  Not much block per done in ostial circumflex.  ML a and left main is 7.8 mm2.     Assessment and plan     CT surgery consultation.     If patient not a candidate for CT surgery, then PCI from left main into ostial LAD.  Provisional PCI of circumflex if required due to plaque shift into circumflex.     Aspirin 81 mg daily     Plavix 75 mg daily     Follow-up in Cardiology Clinic      · 50% distal LM extending into ostial LAD  · 80% ostial LAD stenosis  · Patent mid RCA stent with 20% in stent restenosis  · LVEDP (Pre): 20        · Normal left ventricular systolic function. The estimated ejection fraction is 60%  · No wall  "motion abnormalities.  · Moderate eccentric left ventricular hypertrophy.  · Mild left ventricular enlargement.  · Normal right ventricular systolic function.  · The sinuses of Valsalva is mildly dilated. 3.8cm.  · Mild aortic regurgitation.  · The estimated PA systolic pressure is 33 mm Hg        PAST MEDICAL HISTORY:     Past Medical History:   Diagnosis Date    NAT (acute kidney injury) 3/19/2017    ALLERGIC RHINITIS     Anemia     Anxiety     Basal cell carcinoma 10/19/2018    forehead and right medial shoulder    Basal cell carcinoma 01/09/2019    left nasal bridge and left posterior ear    Chronic rhinitis 5/3/2013    Chronic rhinitis 5/3/2013    Coronary artery disease involving native coronary artery of native heart without angina pectoris s/p RCA stent     Cortical cataract of both eyes 3/18/2016    Delayed sleep phase syndrome 3/13/2019    Depression     Erectile dysfunction 3/24/2014    Erectile dysfunction 3/24/2014    Essential hypertension     GERD (gastroesophageal reflux disease) 7/25/2012    Gout, arthritis     Grade III open fracture of left tibia and fibula s/p ex-fix on 7/1/16 and ORIF of left tibia on 7/15 7/6/2016    H/O: iritis     Helicobacter pylori (H. pylori) infection     Treated    Herpes simplex keratoconjunctivitis 9/30/2015    - on acyclovir - followed by opthalmology, Dr. Uribe     Herpes simplex keratoconjunctivitis 9/30/2015    - on acyclovir - followed by opthalmology, Dr. Uribe     Hyperkalemia 2/28/2017    Hyperlipidemia     Hypogonadism male     Hypogonadism male     Mixed anxiety and depressive disorder     Morbid obesity     Obstructive sleep apnea on CPAP     CPAP    Osteoarthritis of left knee 7/25/2012    Paroxysmal atrial fibrillation 7/6/2016    Primary osteoarthritis of left knee 7/25/2012    Prominent aorta 1/25/2016    "RESULTS: THE HEART IS MILDLY ENLARGED WITH A SLIGHTLY PROMINENT AORTA" - Xray Chest PA & Lateral 12-     " "Prostate cancer 2/15/2016    - followed by urology, Dr. Young     Prostate cancer 2/15/2016    - followed by urology, Dr. 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 Conteh    CATARACT EXTRACTION W/  INTRAOCULAR LENS IMPLANT Left 4/12/2016        EXTERNAL FIXATION TIBIAL FRACTURE Left 07/01/2016    INSERTION OF IMPLANTABLE LOOP RECORDER  04/07/2017    LEFT HEART CATHETERIZATION Left 1/30/2020    Procedure: Left heart cath;  Surgeon: Benjamin Sandhu MD;  [facility] C[facility] LAB;  Service: Cardiology;  Laterality: Left;  RN PREOP 1/28/20--Pt starting Plavix loading dose today (8pills)- Dr. Sandhu aware.  Pt has a bandaged "non healing area to LLE"--Dr. Sandhu aware.    ORIF TIBIA FRACTURE Left 07/15/2016    RADIOACTIVE SEED IMPLANTATION OF PROSTATE N/A 8/8/2018    Procedure: INSERTION, RADIOACTIVE SEED, PROSTATE;  Surgeon: Bipin Thompson MD;  Location: [address];  Service: Urology;  Laterality: N/A;  1 hour    Squamous cell cancer removal x3 with Mohs surgery      TONSILLECTOMY      TOTAL KNEE ARTHROPLASTY  10/2012    trus/bx         ALLERGIES AND MEDICATION:     Review of patient's allergies indicates:   Allergen Reactions    Ciprofloxacin Rash     Diffuse pruritic morbilliform rash developed 3/15/2017 after dose of cipro; previously in 2/2017 he had rash/fevers after initiation of " cipro    Zosyn [piperacillin-tazobactam] Rash     Diffuse pruritic morbilliform rash developed 3/15/2017.  Then, 430am dose on 3/16 and rash worsened with SOB/tachypnea but no hypoxemia.     Bacitracin Itching and Rash     Violaceous rash in area of topical Tx.         Medication List           Accurate as of February 4, 2020  2:48 PM. If you have any questions, ask your nurse or doctor.               CHANGE how you take these medications    artificial tears 0.5 % ophthalmic solution  Commonly known as:  ISOPTO TEARS  Place 1 drop into both eyes as needed.  What changed:  reasons to take this     furosemide 20 MG tablet  Commonly known as:  LASIX  Take 1 tablet (20 mg total) by mouth once daily.  What changed:    · when to take this  · reasons to take this     gabapentin 300 MG capsule  Commonly known as:  NEURONTIN  Take 1 capsule (300 mg total) by mouth 2 (two) times daily.  What changed:    · when to take this  · reasons to take this        CONTINUE taking these medications    acyclovir 800 MG Tab  Commonly known as:  ZOVIRAX  Take 1 tablet (800 mg total) by mouth 2 (two) times daily.     albuterol 90 mcg/actuation inhaler  Commonly known as:  PROVENTIL/VENTOLIN HFA  Inhale 2 puffs into the lungs every 6 (six) hours as needed for Wheezing. Rescue     apixaban 5 mg Tab  Commonly known as:  ELIQUIS  Take 1 tablet (5 mg total) by mouth 2 (two) times daily.     atorvastatin 40 MG tablet  Commonly known as:  LIPITOR  Take 1 tablet (40 mg total) by mouth once daily. Please make a follow up visit with your doctor for more refills of this medication     azelastine 137 mcg (0.1 %) nasal spray  Commonly known as:  ASTELIN  1 spray (137 mcg total) by Nasal route 2 (two) times daily.     clopidogreL 75 mg tablet  Commonly known as:  PLAVIX  Take 1 tablet (75 mg total) by mouth once daily. 8 pills the first day     fluticasone propionate 50 mcg/actuation nasal spray  Commonly known as:  FLONASE  1 spray (50 mcg total) by  Each Nostril route once daily.     melatonin  Commonly known as:  MELATIN     metoprolol succinate 25 MG 24 hr tablet  Commonly known as:  TOPROL-XL  Take 1 tablet (25 mg total) by mouth once daily.     Multiple Vitamins per tablet  Generic drug:  multivitamin     nitroGLYCERIN 0.4 MG SL tablet  Commonly known as:  NITROSTAT  Place 1 tablet (0.4 mg total) under the tongue every 5 (five) minutes as needed for Chest pain.     olmesartan 20 MG tablet  Commonly known as:  BENICAR  Take 1 tablet (20 mg total) by mouth once daily. (replaces ramipril for blood pressure)     oxyCODONE-acetaminophen 5-325 mg per tablet  Commonly known as:  Percocet  Take 1 tablet by mouth every 4 to 6 hours as needed for Pain.     tamsulosin 0.4 mg Cap  Commonly known as:  FLOMAX  Take 1 capsule (0.4 mg total) by mouth once daily.     venlafaxine 75 MG tablet  Commonly known as:  EFFEXOR  Take 2 tablets (150 mg total) by mouth 2 (two) times daily.     VITAMIN B-12 ORAL     vitamin D 1000 units Tab  Commonly known as:  VITAMIN D3  Take 1 tablet (1,000 Units total) by mouth once daily.           Where to Get Your Medications      These medications were sent to [id number] - [place] - [address]  [address] [place] [address]    Phone:  [phone]   · furosemide 20 MG tablet         SOCIAL HISTORY:     Social History     Socioeconomic History    Marital status:      Spouse name: Not on file    Number of children: Not on file    Years of education: Not on file    Highest education level: Not on file   Occupational History    Occupation: Retired    Social Needs    Financial resource strain: Not hard at all    Food insecurity:     Worry: Never true     Inability: Never true    Transportation needs:     Medical: No     Non-medical: No   Tobacco Use    Smoking status: Never Smoker    Smokeless tobacco: Never Used   Substance and Sexual Activity    Alcohol use: Yes     Frequency: 2-4 times a  month     Drinks per session: 3 or 4     Binge frequency: Less than monthly     Comment: occasionally, beer    Drug use: No    Sexual activity: Yes   Lifestyle    Physical activity:     Days per week: Not on file     Minutes per session: Not on file    Stress: Not on file   Relationships    Social connections:     Talks on phone: Three times a week     Gets together: Once a week     Attends Latter day service: More than 4 times per year     Active member of club or organization: Yes     Attends meetings of clubs or organizations: More than 4 times per year     Relationship status:    Other Topics Concern    Not on file   Social History Narrative    Not on file       FAMILY HISTORY:     Family History   Problem Relation Age of Onset    Skin cancer Father     Lung cancer Father     Cancer Father         smoker,     Alzheimer's disease Mother     Hypertension Mother     Cancer Sister         colon, lung cancer     No Known Problems Sister     Cancer Brother         skin cancer, polypectomy     Peripheral vascular disease Unknown     No Known Problems Maternal Aunt     No Known Problems Maternal Uncle     No Known Problems Paternal Aunt     No Known Problems Paternal Uncle     No Known Problems Maternal Grandmother     No Known Problems Maternal Grandfather     No Known Problems Paternal Grandmother     No Known Problems Paternal Grandfather     Melanoma Neg Hx     Psoriasis Neg Hx     Lupus Neg Hx     Eczema Neg Hx     Amblyopia Neg Hx     Blindness Neg Hx     Cataracts Neg Hx     Diabetes Neg Hx     Glaucoma Neg Hx     Macular degeneration Neg Hx     Retinal detachment Neg Hx     Strabismus Neg Hx     Stroke Neg Hx     Thyroid disease Neg Hx     Acne Neg Hx        REVIEW OF SYSTEMS:   Review of Systems   Constitution: Negative.   HENT: Negative.    Eyes: Negative.    Cardiovascular: Positive for chest pain and dyspnea on exertion.   Respiratory: Negative.    Endocrine:  Negative.    Hematologic/Lymphatic: Negative.    Skin: Negative.    Musculoskeletal: Negative.    Gastrointestinal: Negative.    Genitourinary: Negative.    Neurological: Negative.    Psychiatric/Behavioral: Negative.    Allergic/Immunologic: Negative.        A 10 point review of systems was performed and all the pertinent positives have been mentioned. Rest of review of systems was negative.        PHYSICAL EXAM:     Vitals:    02/04/20 1359   BP: 133/61   Pulse: 86   Resp: 16    Body mass index is 45.96 kg/m².  Weight: (!) 158 kg (348 lb 5.2 oz)         Physical Exam   Constitutional: He is oriented to person, place, and time. He appears well-developed and well-nourished.   HENT:   Head: Normocephalic.   Eyes: Pupils are equal, round, and reactive to light. Conjunctivae are normal.   Neck: Normal range of motion. Neck supple.   Cardiovascular: Normal rate, regular rhythm and normal heart sounds.   Pulmonary/Chest: Effort normal and breath sounds normal.   Abdominal: Soft. Bowel sounds are normal.   Neurological: He is alert and oriented to person, place, and time.   Skin: Skin is warm.   Vitals reviewed.        DATA:     Laboratory:  CBC:  Recent Labs   Lab 12/12/17  1318 09/26/18  0937 01/28/20  1508   WBC 10.25 12.14 10.17   Hemoglobin 12.7 L 12.7 L 11.5 L   Hematocrit 38.3 L 38.9 L 34.5 L   Platelets 163 173 175       CHEMISTRIES:  Recent Labs   Lab 03/22/17  0400 03/23/17  0452  05/03/18  1346 07/26/18  1442 01/28/20  1508   Glucose 102 111 H   < > 105 109 98   Sodium 140 139   < > 139 140 141   Potassium 4.3 3.9   < > 4.8 4.5 4.5   BUN, Bld 20 18   < > 21 16 18   Creatinine 1.7 H 1.7 H   < > 1.2 1.2 1.2   eGFR if  46.2 A 46.2 A   < > >60 >60.0 >60   eGFR if non  40.0 A 40.0 A   < > >60 >60.0 60   Calcium 8.4 L 9.0   < > 9.3 9.3 8.6 L   Magnesium 1.8 1.9  --  2.3  --   --     < > = values in this interval not displayed.       CARDIAC BIOMARKERS:  Recent Labs   Lab 03/17/17  0802    CPK 48       COAGS:  Recent Labs   Lab 02/24/17  1335 01/28/20  1508   INR 1.0 1.0       LIPIDS/LFTS:  Recent Labs   Lab 04/25/17  0816  07/07/17  1306 12/12/17  1318 05/03/18  1346 11/28/18  1356   Cholesterol 133  --   --  146  --  150   Triglycerides 137  --   --  152 H  --  149   HDL 23 L  --   --  32 L  --  30 L   LDL Cholesterol 82.6  --   --  83.6  --  90.2   Non-HDL Cholesterol 110  --   --  114  --  120   AST  --    < > 14 20 14  --    ALT  --    < > 18 29 20  --     < > = values in this interval not displayed.       Hemoglobin A1C   Date Value Ref Range Status   07/11/2016 5.3 4.5 - 6.2 % Final     Comment:     According to ADA guidelines, hemoglobin A1C <7.0% represents  optimal control in non-pregnant diabetic patients.  Different  metrics may apply to specific populations.   Standards of Medical Care in Diabetes - 2016.  For the purpose of screening for the presence of diabetes:  <5.7%     Consistent with the absence of diabetes  5.7-6.4%  Consistent with increasing risk for diabetes   (prediabetes)  >or=6.5%  Consistent with diabetes  Currently no consensus exists for use of hemoglobin A1C  for diagnosis of diabetes for children.     03/31/2015 5.3 4.5 - 6.2 % Final   03/03/2014 5.6 4.5 - 6.2 % Final       TSH  Recent Labs   Lab 03/18/17  0508   TSH 6.157 H       The 10-year ASCVD risk score (Yannick MURRAY Jr., et al., 2013) is: 28.7%    Values used to calculate the score:      Age: 75 years      Sex: Male      Is Non- : No      Diabetic: No      Tobacco smoker: No      Systolic Blood Pressure: 133 mmHg      Is BP treated: Yes      HDL Cholesterol: 30 mg/dL      Total Cholesterol: 150 mg/dL             ASSESSMENT AND PLAN     Patient Active Problem List   Diagnosis    Essential hypertension    Hyperlipidemia    Coronary artery disease involving native coronary artery of native heart without angina pectoris s/p RCA stent    Obstructive sleep apnea    GERD (gastroesophageal  reflux disease)    Benign prostatic hyperplasia with urinary obstruction    Morbid obesity with body mass index of 45.0-49.9 in adult    Prostate cancer    Paroxysmal atrial fibrillation     Major depressive disorder, recurrent episode, mild    Generalized anxiety disorder    History of gout    Herpes simplex keratoconjunctivitis    Decreased range of motion of left ankle    Right leg weakness    Impaired mobility    Balance problems    PVD (peripheral vascular disease)    Bilateral leg edema    Bilateral carotid artery stenosis    Gait abnormality    Left leg weakness    Delayed sleep phase syndrome    Floppy eyelid syndrome    Dyspnea    Insomnia    Restrictive lung disease    Personal history of asbestosis    Chronic ulcer of left ankle limited to breakdown of skin    Chest pain, atypical         1.  Patient with ostial severe LAD disease, moderate distal left main LAD disease.  Here to discuss various options.  I discussed his case with  at the [facility] to see if he would be a good candidate for LIMA to LAD.  Per CT surgery, he would be very high risk for CT surgery because of his morbid obesity and other comorbidities.  Had a detailed discussion about risks benefits of medical management versus PCI.  After detailed discussion the patient and the family agrees for revascularization percutaneous approach.    Risks, benefits and alternatives of the catheterization procedure were discussed with the patient.The risks of coronary angiography include but are not limited to: bleeding, infection, death, heart attack, arrhythmia, kidney injury or failure, potential need for dialysis, allergic reactions, stroke, need for emergency surgery, hematoma, pseudoaneurysm etc.  Should stenting be indicated, the patient has agreed to dual anti-platelet therapy for 1-consecutive year with a drug-eluting stent and a minimum of 1-month with the use of a bare metal stent. Additionally, pt is aware  that non-compliance is likely to result in stent clotting with heart attack, heart failure, and/or death  The risks of moderate sedation include hypotension, respiratory depression, arrhythmias, bronchospasm, and death. Informed consent was obtained and the  patient is agreeable to proceed with the procedure. Consent was placed on the chart.    Patient has been asked to hold his Eliquis 2 days prior to the procedure.  He has been asked to start taking his aspirin when he holds the Eliquis.        Thank you very much for involving me in the care of your patient.  Please do not hesitate to contact me if there are any questions.      Miguel Angel Brady MD, FACC, [facility]  Interventional Cardiologist, [facility].           This note was dictated with the help of speech recognition software.  There might be un-intended errors and/or substitutions.

## 2020-02-06 ENCOUNTER — OFFICE VISIT (OUTPATIENT)
Dept: PODIATRY | Facility: CLINIC | Age: 75
End: 2020-02-06
Payer: MEDICARE

## 2020-02-06 VITALS
BODY MASS INDEX: 41.75 KG/M2 | WEIGHT: 315 LBS | DIASTOLIC BLOOD PRESSURE: 68 MMHG | SYSTOLIC BLOOD PRESSURE: 111 MMHG | HEIGHT: 73 IN | HEART RATE: 82 BPM

## 2020-02-06 DIAGNOSIS — L97.929 VENOUS STASIS ULCER OF LEFT LOWER LEG WITH EDEMA OF LEFT LOWER LEG: ICD-10-CM

## 2020-02-06 DIAGNOSIS — R60.0 VENOUS STASIS ULCER OF LEFT LOWER LEG WITH EDEMA OF LEFT LOWER LEG: ICD-10-CM

## 2020-02-06 DIAGNOSIS — I73.9 PVD (PERIPHERAL VASCULAR DISEASE): Primary | ICD-10-CM

## 2020-02-06 DIAGNOSIS — I83.892 VENOUS STASIS ULCER OF LEFT LOWER LEG WITH EDEMA OF LEFT LOWER LEG: ICD-10-CM

## 2020-02-06 DIAGNOSIS — I83.029 VENOUS STASIS ULCER OF LEFT LOWER LEG WITH EDEMA OF LEFT LOWER LEG: ICD-10-CM

## 2020-02-06 PROCEDURE — 99213 OFFICE O/P EST LOW 20 MIN: CPT | Mod: HCNC,S$GLB,, | Performed by: PODIATRIST

## 2020-02-06 PROCEDURE — 1126F AMNT PAIN NOTED NONE PRSNT: CPT | Mod: HCNC,S$GLB,, | Performed by: PODIATRIST

## 2020-02-06 PROCEDURE — 3288F FALL RISK ASSESSMENT DOCD: CPT | Mod: HCNC,CPTII,S$GLB, | Performed by: PODIATRIST

## 2020-02-06 PROCEDURE — 3288F PR FALLS RISK ASSESSMENT DOCUMENTED: ICD-10-PCS | Mod: HCNC,CPTII,S$GLB, | Performed by: PODIATRIST

## 2020-02-06 PROCEDURE — 99999 PR PBB SHADOW E&M-EST. PATIENT-LVL III: CPT | Mod: PBBFAC,HCNC,, | Performed by: PODIATRIST

## 2020-02-06 PROCEDURE — 99999 PR PBB SHADOW E&M-EST. PATIENT-LVL III: ICD-10-PCS | Mod: PBBFAC,HCNC,, | Performed by: PODIATRIST

## 2020-02-06 PROCEDURE — 3078F DIAST BP <80 MM HG: CPT | Mod: HCNC,CPTII,S$GLB, | Performed by: PODIATRIST

## 2020-02-06 PROCEDURE — 3074F SYST BP LT 130 MM HG: CPT | Mod: HCNC,CPTII,S$GLB, | Performed by: PODIATRIST

## 2020-02-06 PROCEDURE — 3078F PR MOST RECENT DIASTOLIC BLOOD PRESSURE < 80 MM HG: ICD-10-PCS | Mod: HCNC,CPTII,S$GLB, | Performed by: PODIATRIST

## 2020-02-06 PROCEDURE — 1159F MED LIST DOCD IN RCRD: CPT | Mod: HCNC,S$GLB,, | Performed by: PODIATRIST

## 2020-02-06 PROCEDURE — 1100F PR PT FALLS ASSESS DOC 2+ FALLS/FALL W/INJURY/YR: ICD-10-PCS | Mod: HCNC,CPTII,S$GLB, | Performed by: PODIATRIST

## 2020-02-06 PROCEDURE — 3074F PR MOST RECENT SYSTOLIC BLOOD PRESSURE < 130 MM HG: ICD-10-PCS | Mod: HCNC,CPTII,S$GLB, | Performed by: PODIATRIST

## 2020-02-06 PROCEDURE — 99213 PR OFFICE/OUTPT VISIT, EST, LEVL III, 20-29 MIN: ICD-10-PCS | Mod: HCNC,S$GLB,, | Performed by: PODIATRIST

## 2020-02-06 PROCEDURE — 1100F PTFALLS ASSESS-DOCD GE2>/YR: CPT | Mod: HCNC,CPTII,S$GLB, | Performed by: PODIATRIST

## 2020-02-06 PROCEDURE — 1159F PR MEDICATION LIST DOCUMENTED IN MEDICAL RECORD: ICD-10-PCS | Mod: HCNC,S$GLB,, | Performed by: PODIATRIST

## 2020-02-06 PROCEDURE — 1126F PR PAIN SEVERITY QUANTIFIED, NO PAIN PRESENT: ICD-10-PCS | Mod: HCNC,S$GLB,, | Performed by: PODIATRIST

## 2020-02-06 NOTE — PROGRESS NOTES
Subjective:      Patient ID: Janusz Montgomery Jr. is a 75 y.o. male.    Chief Complaint: Follow-up (2 weeks)      Janusz Montgomery Jr. is a 75 y.o. malewho presents to the clinic for evaluation and treatment of high risk feet. Janusz Montgomery Jr.   has a past medical history of NAT (acute kidney injury) (3/19/2017), ALLERGIC RHINITIS, Anemia, Anxiety, Basal cell carcinoma (10/19/2018), Basal cell carcinoma (01/09/2019), Chronic rhinitis (5/3/2013), Chronic rhinitis (5/3/2013), Coronary artery disease involving native coronary artery of native heart without angina pectoris s/p RCA stent, Cortical cataract of both eyes (3/18/2016), Delayed sleep phase syndrome (3/13/2019), Depression, Erectile dysfunction (3/24/2014), Erectile dysfunction (3/24/2014), Essential hypertension, GERD (gastroesophageal reflux disease) (7/25/2012), Gout, arthritis, Grade III open fracture of left tibia and fibula s/p ex-fix on 7/1/16 and ORIF of left tibia on 7/15 (7/6/2016), H/O: iritis, Helicobacter pylori (H. 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  The patient's chief  complaint is an anterior left leg wound.  One month duration.  Secondary to mild trauma in the presence of edema.  Has attempted self treatment but wound has not healed.  Non painful. Denies nausea, vomiting, fever, chills. History of left foot and ankle ulcers.  This patient has documented high risk feet requiring routine maintenance secondary to peripheral vascular disease.    12/10/2019  Patient relates he has attempted to care for leg ulcer as instructed but wound dimensions have not improved.    12/19/2019 patient relates that he has kept dressing clean dry and intact without itching or discomfort.  He has begun taking antibiotics as prescribed without nausea, vomiting, diarrhea, fever, chills.  No new pedal complaints.    12/31/19: F/u left leg ulceration. Has been in unna boot without issue.     01/07/2020 Patient relates that he has kept dressing clean dry and intact without itching or discomfort. He relates increased callus to posterior heel.  He denies nausea, vomiting, diarrhea, fever, chills.  No new pedal complaints.    01/14/2020  Patient relates that he has kept dressing clean dry and intact without itching however he relates that dressing was slipping and therefore somewhat uncomfortable this week.   He denies nausea, vomiting, diarrhea, fever, chills.  No new pedal complaints.    01/21/2020 patient relates that he has kept dressing clean, dry, intact.  He has no pedal complaints.  He does eyes nausea, vomiting, fever, chills.  He is anxious to get out of Unna boot.    1/29/20: F/u left leg ulceration. Has been wearing tubi . Pt. Is concerned regarding wound appearance.     02/06/2020 patient relates that he has kept dressing clean, dry, intact.  He has no pedal complaints.  He does eyes nausea, vomiting, fever, chills.  He relates that he has a cardiovascular procedure scheduled next week.       PCP: Miguelina Weathers MD    Date Last Seen by PCP:   Chief Complaint   Patient presents with     Follow-up     2 weeks     Current shoe gear: tennis shoe    Hemoglobin A1C   Date Value Ref Range Status   07/11/2016 5.3 4.5 - 6.2 % Final     Comment:     According to ADA guidelines, hemoglobin A1C <7.0% represents  optimal control in non-pregnant diabetic patients.  Different  metrics may apply to specific populations.   Standards of Medical Care in Diabetes - 2016.  For the purpose of screening for the presence of diabetes:  <5.7%     Consistent with the absence of diabetes  5.7-6.4%  Consistent with increasing risk for diabetes   (prediabetes)  >or=6.5%  Consistent with diabetes  Currently no consensus exists for use of hemoglobin A1C  for diagnosis of diabetes for children.     03/31/2015 5.3 4.5 - 6.2 % Final   03/03/2014 5.6 4.5 - 6.2 % Final         Current Outpatient Medications on File Prior to Visit   Medication Sig Dispense Refill    acyclovir (ZOVIRAX) 800 MG Tab Take 1 tablet (800 mg total) by mouth 2 (two) times daily. 180 tablet 6    apixaban (ELIQUIS) 5 mg Tab Take 1 tablet (5 mg total) by mouth 2 (two) times daily. 180 tablet 4    artificial tears (ISOPTO TEARS) 0.5 % ophthalmic solution Place 1 drop into both eyes as needed. (Patient taking differently: Place 1 drop into both eyes as needed (for dry eyes). )      atorvastatin (LIPITOR) 40 MG tablet Take 1 tablet (40 mg total) by mouth once daily. Please make a follow up visit with your doctor for more refills of this medication 90 tablet 0    azelastine (ASTELIN) 137 mcg (0.1 %) nasal spray 1 spray (137 mcg total) by Nasal route 2 (two) times daily. 30 mL 2    clopidogrel (PLAVIX) 75 mg tablet Take 1 tablet (75 mg total) by mouth once daily. 8 pills the first day 38 tablet 11    CYANOCOBALAMIN, VITAMIN B-12, (VITAMIN B-12 ORAL) Take 2,500 mcg by mouth once daily.      fluticasone propionate (FLONASE) 50 mcg/actuation nasal spray 1 spray (50 mcg total) by Each Nostril route once daily. 1 Bottle 0    furosemide (LASIX) 20 MG tablet Take 1  tablet (20 mg total) by mouth once daily. 90 tablet 0    melatonin 5 mg Tab Take 10 mg by mouth nightly.      metoprolol succinate (TOPROL-XL) 25 MG 24 hr tablet Take 1 tablet (25 mg total) by mouth once daily. 30 tablet 11    multivitamin (MULTIPLE VITAMINS) per tablet Take 1 tablet by mouth once daily.      nitroGLYCERIN (NITROSTAT) 0.4 MG SL tablet Place 1 tablet (0.4 mg total) under the tongue every 5 (five) minutes as needed for Chest pain. 25 tablet 11    olmesartan (BENICAR) 20 MG tablet Take 1 tablet (20 mg total) by mouth once daily. (replaces ramipril for blood pressure) 90 tablet 1    oxyCODONE-acetaminophen (PERCOCET) 5-325 mg per tablet Take 1 tablet by mouth every 4 to 6 hours as needed for Pain. 20 tablet 0    tamsulosin (FLOMAX) 0.4 mg Cap Take 1 capsule (0.4 mg total) by mouth once daily. 90 capsule 3    venlafaxine (EFFEXOR) 75 MG tablet Take 2 tablets (150 mg total) by mouth 2 (two) times daily. 360 tablet 0    vitamin D 1000 units Tab Take 1 tablet (1,000 Units total) by mouth once daily.      albuterol (PROVENTIL/VENTOLIN HFA) 90 mcg/actuation inhaler Inhale 2 puffs into the lungs every 6 (six) hours as needed for Wheezing. Rescue 18 g 0    gabapentin (NEURONTIN) 300 MG capsule Take 1 capsule (300 mg total) by mouth 2 (two) times daily. (Patient taking differently: Take 300 mg by mouth 2 (two) times daily as needed. ) 60 capsule 11     No current facility-administered medications on file prior to visit.        Allergies   Allergen Reactions    Ciprofloxacin Rash     Diffuse pruritic morbilliform rash developed 3/15/2017 after dose of cipro; previously in 2/2017 he had rash/fevers after initiation of cipro    Zosyn [Piperacillin-Tazobactam] Anaphylaxis     Diffuse pruritic morbilliform rash developed 3/15/2017.  Then, 430am dose on 3/16 and rash worsened with SOB/tachypnea but no hypoxemia.     Bacitracin Itching and Rash     Violaceous rash in area of topical Tx.        Past  "Surgical History:   Procedure Laterality Date    Cardiac stenting x2      CATARACT EXTRACTION W/  INTRAOCULAR LENS IMPLANT Right 3/29/2016    Dr. Conteh    CATARACT EXTRACTION W/  INTRAOCULAR LENS IMPLANT Left 4/12/2016        EXTERNAL FIXATION TIBIAL FRACTURE Left 07/01/2016    INSERTION OF IMPLANTABLE LOOP RECORDER  04/07/2017    LEFT HEART CATHETERIZATION Left 1/30/2020    Procedure: Left heart cath;  Surgeon: Benjamin Sandhu MD;  Location: [facility] CATH LAB;  Service: Cardiology;  Laterality: Left;  RN PREOP 1/28/20--Pt starting Plavix loading dose today (8pills)- Dr. Sandhu aware.  Pt has a bandaged "non healing area to LLE"--Dr. Sandhu aware.    ORIF TIBIA FRACTURE Left 07/15/2016    RADIOACTIVE SEED IMPLANTATION OF PROSTATE N/A 8/8/2018    Procedure: INSERTION, RADIOACTIVE SEED, PROSTATE;  Surgeon: Bipin Thompson MD;  Location: [facility] OR [address] [place] [address];  Service: Urology;  Laterality: N/A;  1 hour    Squamous cell cancer removal x3 with Mohs surgery      TONSILLECTOMY      TOTAL KNEE ARTHROPLASTY  10/2012    trus/bx         Family History   Problem Relation Age of Onset    Skin cancer Father     Lung cancer Father     Cancer Father         smoker,     Alzheimer's disease Mother     Hypertension Mother     Cancer Sister         colon, lung cancer     No Known Problems Sister     Cancer Brother         skin cancer, polypectomy     Peripheral vascular disease Unknown     No Known Problems Maternal Aunt     No Known Problems Maternal Uncle     No Known Problems Paternal Aunt     No Known Problems Paternal Uncle     No Known Problems Maternal Grandmother     No Known Problems Maternal Grandfather     No Known Problems Paternal Grandmother     No Known Problems Paternal Grandfather     Melanoma Neg Hx     Psoriasis Neg Hx     Lupus Neg Hx     Eczema Neg Hx     Amblyopia Neg Hx     Blindness Neg Hx     Cataracts Neg Hx     Diabetes Neg Hx     Glaucoma Neg Hx     Macular " degeneration Neg Hx     Retinal detachment Neg Hx     Strabismus Neg Hx     Stroke Neg Hx     Thyroid disease Neg Hx     Acne Neg Hx        Social History     Socioeconomic History    Marital status:      Spouse name: Not on file    Number of children: Not on file    Years of education: Not on file    Highest education level: Not on file   Occupational History    Occupation: Retired    Social Needs    Financial resource strain: Not hard at all    Food insecurity:     Worry: Never true     Inability: Never true    Transportation needs:     Medical: No     Non-medical: No   Tobacco Use    Smoking status: Never Smoker    Smokeless tobacco: Never Used   Substance and Sexual Activity    Alcohol use: Yes     Frequency: 2-4 times a month     Drinks per session: 3 or 4     Binge frequency: Less than monthly     Comment: occasionally, beer    Drug use: No    Sexual activity: Yes   Lifestyle    Physical activity:     Days per week: Not on file     Minutes per session: Not on file    Stress: Not on file   Relationships    Social connections:     Talks on phone: Three times a week     Gets together: Once a week     Attends Christianity service: More than 4 times per year     Active member of club or organization: Yes     Attends meetings of clubs or organizations: More than 4 times per year     Relationship status:    Other Topics Concern    Not on file   Social History Narrative    Not on file       Review of Systems   Constitution: Negative for chills and fever.   Cardiovascular: Negative for claudication and leg swelling.   Respiratory: Negative for cough and shortness of breath.    Skin: Positive for dry skin and poor wound healing. Negative for itching and rash.   Musculoskeletal: Positive for arthritis, joint pain, joint swelling and myalgias. Negative for falls and muscle weakness.   Gastrointestinal: Negative for diarrhea, nausea and vomiting.   Neurological: Positive for paresthesias.  "Negative for numbness, tremors and weakness.   Psychiatric/Behavioral: Negative for altered mental status and hallucinations.         Objective:       Vitals:    02/06/20 1553   BP: 111/68   Pulse: 82   Weight: (!) 158 kg (348 lb 5.2 oz)   Height: 6' 1" (1.854 m)   PainSc: 0-No pain       Physical Exam   Constitutional:  Non-toxic appearance. He does not have a sickly appearance. No distress.   Pt. is well-developed, well-nourished, appears stated age, in no acute distress, alert and oriented x 3. No evidence of depression, anxiety, or agitation. Calm, cooperative, and communicative. Appropriate interactions and affect.   Cardiovascular:   Pulses:       Dorsalis pedis pulses are 2+ on the right side, and 2+ on the left side.        Posterior tibial pulses are 1+ on the right side, and 1+ on the left side.   There is decreased digital hair. Skin is atrophic, slightly hyperpigmented, and pitting edema nora (L>>R)     Pulmonary/Chest: No respiratory distress.   Musculoskeletal:        Right ankle: He exhibits swelling. No tenderness. No lateral malleolus, no medial malleolus, no AITFL, no CF ligament, no head of 5th metatarsal and no proximal fibula tenderness found. Achilles tendon exhibits no pain, no defect and normal Zabala's test results.        Left ankle: He exhibits swelling. No tenderness. No lateral malleolus, no medial malleolus, no AITFL, no CF ligament and no posterior TFL tenderness found. Achilles tendon exhibits no pain, no defect and normal Zabala's test results.        Right foot: There is no tenderness and no bony tenderness.        Left foot: There is no tenderness and no bony tenderness.   Decreased stride, station of gait.  apropulsive toe off.  Increased angle and base of gait.    Patient has hammertoes of digits 2-5 bilateral partially reducible without symptom today.     There is equinus deformity bilateral. There is limitation of dorsiflexion with knees extended and with knees " flexed.    Shoes reveals lateral heel counter wear bilateral in athletic shoes.        Lymphadenopathy:   No lymphatic streaking    Negative lymphadenopathy bilateral popliteal fossa and tarsal tunnel.     Neurological:   [place] 5.07 monofilament is intact bilateral feet. Sharp/dull sensation is also intact Bilateral feet. Proprioception is grossly intact. Vibratory sensation intact (pt able to sense vibration stop within 3-5 seconds)     Skin: Skin is warm and dry. Abrasion (left anterior leg as pictured, to level of subQ. local edema.  Granular base.  no fluctuance, no crepitus, no purulence) and lesion noted. No bruising, no burn, no laceration and no rash noted. He is not diaphoretic. No cyanosis or erythema. No pallor. Nails show no clubbing.   Ulcer location: medial left ankle  Signs of infection: none  Drainage: none  Purulence: no  Crepitus/fluctuance: no  Periwound: Reddened  Base: thin epithelial itssue  Depth: skin  Probe to bone: no      Ulcer location: posterior left heel  Signs of infection:none  Drainage:  None  Periwound: intact  Base: thin epithelial with bleeding within layers     Psychiatric: His mood appears not anxious. His affect is not inappropriate. His speech is not slurred. He is not combative. He is communicative. He is attentive.   Nursing note reviewed.    02/06/20 1/31/20 01/21/20 01/14/20 01/07/20 12/31/19: Epic unable to load pic.     12/19/2019          12/10/19          11/14/19          Assessment:       Encounter Diagnoses   Name Primary?    PVD (peripheral vascular disease) Yes    Venous stasis ulcer of left lower leg with edema of left lower leg          Plan:       Janusz was seen today for follow-up.    Diagnoses and all orders for this visit:    PVD (peripheral vascular disease)    Venous stasis ulcer of left lower leg with edema of left lower leg      I counseled the patient on his conditions, their implications  and medical management.    The site is cleansed of foreign material as much as possible. The patient is alerted to watch for any signs of infection (redness, pus, pain, increased swelling or fever) and call if such occurs.    Wound is improved, without signs of infection    endoform and coflex applied Detailed home care instructions dispensed.     Patient is to elevate legs. When sleeping, place a pillow under lower extremities. When sitting, support the legs so that they are level with the waist.    Follow-up:Patient is to return to the clinic in 1 weeks for follow-up but should call [facility] immediately if any signs of infection, such as fever, chills, sweats, increased redness or pain.    If wound does not improve he may require biopsy given his history of multiple skin cancers.  Discussed this with patient and he agrees.-- Declines biopsy today     Short-term goals include maintaining good offloading and minimizing bioburden, promoting granulation and epithelialization to healing.  Long-term goals include keeping the wound healed by good offloading and medical management under the direction of internist.

## 2020-02-07 ENCOUNTER — HOSPITAL ENCOUNTER (OUTPATIENT)
Dept: PREADMISSION TESTING | Facility: HOSPITAL | Age: 75
Discharge: HOME | End: 2020-02-07
Attending: INTERNAL MEDICINE
Payer: MEDICARE

## 2020-02-07 VITALS
DIASTOLIC BLOOD PRESSURE: 63 MMHG | BODY MASS INDEX: 41.75 KG/M2 | SYSTOLIC BLOOD PRESSURE: 116 MMHG | RESPIRATION RATE: 18 BRPM | OXYGEN SATURATION: 97 % | HEART RATE: 76 BPM | HEIGHT: 73 IN | TEMPERATURE: 98 F | WEIGHT: 315 LBS

## 2020-02-07 DIAGNOSIS — I25.118 CORONARY ARTERY DISEASE OF NATIVE ARTERY OF NATIVE HEART WITH STABLE ANGINA PECTORIS: ICD-10-CM

## 2020-02-07 LAB
ANION GAP SERPL CALC-SCNC: 7 MMOL/L (ref 8–16)
BASOPHILS # BLD AUTO: 0.07 K/UL (ref 0–0.2)
BASOPHILS NFR BLD: 0.7 % (ref 0–1.9)
BUN SERPL-MCNC: 25 MG/DL (ref 8–23)
CALCIUM SERPL-MCNC: 9 MG/DL (ref 8.7–10.5)
CHLORIDE SERPL-SCNC: 108 MMOL/L (ref 95–110)
CO2 SERPL-SCNC: 25 MMOL/L (ref 23–29)
CREAT SERPL-MCNC: 1.1 MG/DL (ref 0.5–1.4)
DIFFERENTIAL METHOD: ABNORMAL
EOSINOPHIL # BLD AUTO: 0.3 K/UL (ref 0–0.5)
EOSINOPHIL NFR BLD: 3.2 % (ref 0–8)
ERYTHROCYTE [DISTWIDTH] IN BLOOD BY AUTOMATED COUNT: 14.3 % (ref 11.5–14.5)
EST. GFR  (AFRICAN AMERICAN): >60 ML/MIN/1.73 M^2
EST. GFR  (NON AFRICAN AMERICAN): >60 ML/MIN/1.73 M^2
GLUCOSE SERPL-MCNC: 107 MG/DL (ref 70–110)
HCT VFR BLD AUTO: 35.9 % (ref 40–54)
HGB BLD-MCNC: 11.7 G/DL (ref 14–18)
IMM GRANULOCYTES # BLD AUTO: 0.01 K/UL (ref 0–0.04)
IMM GRANULOCYTES NFR BLD AUTO: 0.1 % (ref 0–0.5)
LYMPHOCYTES # BLD AUTO: 2.1 K/UL (ref 1–4.8)
LYMPHOCYTES NFR BLD: 19.9 % (ref 18–48)
MCH RBC QN AUTO: 33.1 PG (ref 27–31)
MCHC RBC AUTO-ENTMCNC: 32.6 G/DL (ref 32–36)
MCV RBC AUTO: 101 FL (ref 82–98)
MONOCYTES # BLD AUTO: 0.6 K/UL (ref 0.3–1)
MONOCYTES NFR BLD: 6 % (ref 4–15)
NEUTROPHILS # BLD AUTO: 7.2 K/UL (ref 1.8–7.7)
NEUTROPHILS NFR BLD: 70.1 % (ref 38–73)
NRBC BLD-RTO: 0 /100 WBC
PLATELET # BLD AUTO: 215 K/UL (ref 150–350)
PMV BLD AUTO: 10.2 FL (ref 9.2–12.9)
POTASSIUM SERPL-SCNC: 4.3 MMOL/L (ref 3.5–5.1)
RBC # BLD AUTO: 3.54 M/UL (ref 4.6–6.2)
SODIUM SERPL-SCNC: 140 MMOL/L (ref 136–145)
WBC # BLD AUTO: 10.31 K/UL (ref 3.9–12.7)

## 2020-02-07 PROCEDURE — 80048 BASIC METABOLIC PNL TOTAL CA: CPT | Mod: HCNC

## 2020-02-07 PROCEDURE — 85025 COMPLETE CBC W/AUTO DIFF WBC: CPT | Mod: HCNC

## 2020-02-07 PROCEDURE — 36415 COLL VENOUS BLD VENIPUNCTURE: CPT | Mod: HCNC

## 2020-02-07 NOTE — DISCHARGE INSTRUCTIONS
"  Your surgery is scheduled for __Friday feb. 14, 2020_.    Call [phone] between 2 p.m. and 5 p.m. on   _Thursday__ to find out your arrival time for the day of your surgery.      Please report to SAME DAY SURGERY UNIT on the 2nd FLOOR at _______ a.m.  Use front door entrance. The doors open at 0530 am.      If you need WHEELCHAIR assistance please call  [phone] from your cell phone or "0"  from the  hospital courtesy phone located to the right after you enter the hospital lobby.      INSTRUCTIONS IMPORTANT!!!  ¨ Do not eat or drink after 12 midnight-including water. OK to brush teeth, no   gum, candy or mints!    ¨ Take only these medicines with a small swallow of water-morning of surgery.  Plavix, Gabapentin, Metoprolol, and Olmesartan with water.      _x___  Return to Hospital Lab on ______________for additional blood test.    _x___  Prep instructions: ENEMA   SHOWER   OTHER  ______________________  _x___  Please shower using Hibiclens soap the night before AND  the morning of   your surgery/procedure. Do not use Hibiclens on your face or genitals       _x___  No shaving of procedural area at least 4-5 days before surgery due to  increased risk of skin irritation and/or possible infection.  _x___  Do not wear makeup, including mascara. WEARING EYE MAKEUP MAY                        _x___  No powder, lotions or creams to your body.  _x___  You may wear only deodorant on the day of surgery.  _x___  Please remove all jewelry, including piercings and leave at home.  _x___  No money or valuables needed. Please leave at home.  You may bring your          cell phone.  _x___  Please bring any documents given by your doctor.  _x___  If going home the same day, arrange for a ride home. You will not be able to   drive if Anesthesia was used.    _x___  Wear loose fitting clothing. Allow for dressings, bandages.        x        You MAY use Tylenol/acetaminophen until day of surgery.    _x___  Call MD for temperature above " 101 degrees.        _x___ Stop taking any Fish Oil supplement or any Vitamins that contain Vitamin  E at least 5 days prior to surgery.              I have read or had read and explained to me, and understand the above information.  Additional comments or instructions:Please call   [phone] if you have any questions regarding the instructions above.

## 2020-02-08 ENCOUNTER — CLINICAL SUPPORT (OUTPATIENT)
Dept: CARDIOLOGY | Facility: HOSPITAL | Age: 75
End: 2020-02-08
Attending: INTERNAL MEDICINE
Payer: MEDICARE

## 2020-02-08 DIAGNOSIS — Z95.818 STATUS POST PLACEMENT OF IMPLANTABLE LOOP RECORDER: ICD-10-CM

## 2020-02-08 PROCEDURE — 93298 CARDIAC DEVICE CHECK - REMOTE: ICD-10-PCS | Mod: HCNC,,, | Performed by: INTERNAL MEDICINE

## 2020-02-08 PROCEDURE — 93298 REM INTERROG DEV EVAL SCRMS: CPT | Mod: HCNC,,, | Performed by: INTERNAL MEDICINE

## 2020-02-08 PROCEDURE — G2066 INTER DEVC REMOTE 30D: HCPCS | Mod: HCNC | Performed by: INTERNAL MEDICINE

## 2020-02-11 ENCOUNTER — PATIENT OUTREACH (OUTPATIENT)
Dept: ADMINISTRATIVE | Facility: OTHER | Age: 75
End: 2020-02-11

## 2020-02-11 ENCOUNTER — LAB VISIT (OUTPATIENT)
Dept: LAB | Facility: HOSPITAL | Age: 75
End: 2020-02-11
Attending: INTERNAL MEDICINE
Payer: MEDICARE

## 2020-02-11 DIAGNOSIS — E78.5 HYPERLIPIDEMIA, UNSPECIFIED HYPERLIPIDEMIA TYPE: ICD-10-CM

## 2020-02-11 DIAGNOSIS — I10 ESSENTIAL HYPERTENSION: ICD-10-CM

## 2020-02-11 DIAGNOSIS — R73.9 ELEVATED BLOOD SUGAR: ICD-10-CM

## 2020-02-11 LAB
ALBUMIN SERPL BCP-MCNC: 3.8 G/DL (ref 3.5–5.2)
ALP SERPL-CCNC: 133 U/L (ref 55–135)
ALT SERPL W/O P-5'-P-CCNC: 29 U/L (ref 10–44)
ANION GAP SERPL CALC-SCNC: 10 MMOL/L (ref 8–16)
AST SERPL-CCNC: 17 U/L (ref 10–40)
BASOPHILS # BLD AUTO: 0.12 K/UL (ref 0–0.2)
BASOPHILS NFR BLD: 0.9 % (ref 0–1.9)
BILIRUB SERPL-MCNC: 0.7 MG/DL (ref 0.1–1)
BUN SERPL-MCNC: 28 MG/DL (ref 8–23)
CALCIUM SERPL-MCNC: 9.5 MG/DL (ref 8.7–10.5)
CHLORIDE SERPL-SCNC: 107 MMOL/L (ref 95–110)
CHOLEST SERPL-MCNC: 168 MG/DL (ref 120–199)
CHOLEST/HDLC SERPL: 4.9 {RATIO} (ref 2–5)
CO2 SERPL-SCNC: 26 MMOL/L (ref 23–29)
CREAT SERPL-MCNC: 1.5 MG/DL (ref 0.5–1.4)
DIFFERENTIAL METHOD: ABNORMAL
EOSINOPHIL # BLD AUTO: 0.3 K/UL (ref 0–0.5)
EOSINOPHIL NFR BLD: 2.3 % (ref 0–8)
ERYTHROCYTE [DISTWIDTH] IN BLOOD BY AUTOMATED COUNT: 14.3 % (ref 11.5–14.5)
EST. GFR  (AFRICAN AMERICAN): 52.6 ML/MIN/1.73 M^2
EST. GFR  (NON AFRICAN AMERICAN): 45.5 ML/MIN/1.73 M^2
ESTIMATED AVG GLUCOSE: 114 MG/DL (ref 68–131)
GLUCOSE SERPL-MCNC: 112 MG/DL (ref 70–110)
HBA1C MFR BLD HPLC: 5.6 % (ref 4–5.6)
HCT VFR BLD AUTO: 40.6 % (ref 40–54)
HDLC SERPL-MCNC: 34 MG/DL (ref 40–75)
HDLC SERPL: 20.2 % (ref 20–50)
HGB BLD-MCNC: 12.5 G/DL (ref 14–18)
IMM GRANULOCYTES # BLD AUTO: 0.05 K/UL (ref 0–0.04)
IMM GRANULOCYTES NFR BLD AUTO: 0.4 % (ref 0–0.5)
LDLC SERPL CALC-MCNC: 104 MG/DL (ref 63–159)
LYMPHOCYTES # BLD AUTO: 3.3 K/UL (ref 1–4.8)
LYMPHOCYTES NFR BLD: 26 % (ref 18–48)
MCH RBC QN AUTO: 32.8 PG (ref 27–31)
MCHC RBC AUTO-ENTMCNC: 30.8 G/DL (ref 32–36)
MCV RBC AUTO: 107 FL (ref 82–98)
MONOCYTES # BLD AUTO: 0.7 K/UL (ref 0.3–1)
MONOCYTES NFR BLD: 5.5 % (ref 4–15)
NEUTROPHILS # BLD AUTO: 8.3 K/UL (ref 1.8–7.7)
NEUTROPHILS NFR BLD: 64.9 % (ref 38–73)
NONHDLC SERPL-MCNC: 134 MG/DL
NRBC BLD-RTO: 0 /100 WBC
PLATELET # BLD AUTO: 238 K/UL (ref 150–350)
PMV BLD AUTO: 11.1 FL (ref 9.2–12.9)
POTASSIUM SERPL-SCNC: 5.4 MMOL/L (ref 3.5–5.1)
PROT SERPL-MCNC: 7.5 G/DL (ref 6–8.4)
RBC # BLD AUTO: 3.81 M/UL (ref 4.6–6.2)
SODIUM SERPL-SCNC: 143 MMOL/L (ref 136–145)
TRIGL SERPL-MCNC: 150 MG/DL (ref 30–150)
WBC # BLD AUTO: 12.83 K/UL (ref 3.9–12.7)

## 2020-02-11 PROCEDURE — 36415 COLL VENOUS BLD VENIPUNCTURE: CPT | Mod: HCNC,PO

## 2020-02-11 PROCEDURE — 80053 COMPREHEN METABOLIC PANEL: CPT | Mod: HCNC

## 2020-02-11 PROCEDURE — 85025 COMPLETE CBC W/AUTO DIFF WBC: CPT | Mod: HCNC

## 2020-02-11 PROCEDURE — 80061 LIPID PANEL: CPT | Mod: HCNC

## 2020-02-11 PROCEDURE — 83036 HEMOGLOBIN GLYCOSYLATED A1C: CPT | Mod: HCNC

## 2020-02-11 NOTE — PROGRESS NOTES
Chart reviewed.   Requested updates from Care Everywhere.  Immunizations reconciled.    updated.  Colonoscopy open case request 08/23/2019

## 2020-02-13 ENCOUNTER — OFFICE VISIT (OUTPATIENT)
Dept: PODIATRY | Facility: CLINIC | Age: 75
End: 2020-02-13
Payer: MEDICARE

## 2020-02-13 VITALS — BODY MASS INDEX: 41.75 KG/M2 | WEIGHT: 315 LBS | HEIGHT: 73 IN

## 2020-02-13 DIAGNOSIS — L97.929 VENOUS STASIS ULCER OF LEFT LOWER LEG WITH EDEMA OF LEFT LOWER LEG: ICD-10-CM

## 2020-02-13 DIAGNOSIS — I87.8 VENOUS STASIS OF LOWER EXTREMITY: ICD-10-CM

## 2020-02-13 DIAGNOSIS — R60.0 VENOUS STASIS ULCER OF LEFT LOWER LEG WITH EDEMA OF LEFT LOWER LEG: ICD-10-CM

## 2020-02-13 DIAGNOSIS — I73.9 PVD (PERIPHERAL VASCULAR DISEASE): Primary | ICD-10-CM

## 2020-02-13 DIAGNOSIS — I83.029 VENOUS STASIS ULCER OF LEFT LOWER LEG WITH EDEMA OF LEFT LOWER LEG: ICD-10-CM

## 2020-02-13 DIAGNOSIS — I83.892 VENOUS STASIS ULCER OF LEFT LOWER LEG WITH EDEMA OF LEFT LOWER LEG: ICD-10-CM

## 2020-02-13 PROCEDURE — 1101F PT FALLS ASSESS-DOCD LE1/YR: CPT | Mod: HCNC,CPTII,S$GLB, | Performed by: PODIATRIST

## 2020-02-13 PROCEDURE — 1126F AMNT PAIN NOTED NONE PRSNT: CPT | Mod: HCNC,S$GLB,, | Performed by: PODIATRIST

## 2020-02-13 PROCEDURE — 1159F MED LIST DOCD IN RCRD: CPT | Mod: HCNC,S$GLB,, | Performed by: PODIATRIST

## 2020-02-13 PROCEDURE — 1126F PR PAIN SEVERITY QUANTIFIED, NO PAIN PRESENT: ICD-10-PCS | Mod: HCNC,S$GLB,, | Performed by: PODIATRIST

## 2020-02-13 PROCEDURE — 99213 OFFICE O/P EST LOW 20 MIN: CPT | Mod: HCNC,S$GLB,, | Performed by: PODIATRIST

## 2020-02-13 PROCEDURE — 1159F PR MEDICATION LIST DOCUMENTED IN MEDICAL RECORD: ICD-10-PCS | Mod: HCNC,S$GLB,, | Performed by: PODIATRIST

## 2020-02-13 PROCEDURE — 99999 PR PBB SHADOW E&M-EST. PATIENT-LVL III: ICD-10-PCS | Mod: PBBFAC,HCNC,, | Performed by: PODIATRIST

## 2020-02-13 PROCEDURE — 99999 PR PBB SHADOW E&M-EST. PATIENT-LVL III: CPT | Mod: PBBFAC,HCNC,, | Performed by: PODIATRIST

## 2020-02-13 PROCEDURE — 99213 PR OFFICE/OUTPT VISIT, EST, LEVL III, 20-29 MIN: ICD-10-PCS | Mod: HCNC,S$GLB,, | Performed by: PODIATRIST

## 2020-02-13 PROCEDURE — 1101F PR PT FALLS ASSESS DOC 0-1 FALLS W/OUT INJ PAST YR: ICD-10-PCS | Mod: HCNC,CPTII,S$GLB, | Performed by: PODIATRIST

## 2020-02-13 NOTE — PROGRESS NOTES
Subjective:      Patient ID: Janusz Montgomery Jr. is a 75 y.o. male.    Chief Complaint: Wound Check (Dr Jasiel HARRISON)      Janusz Montgomery Jr. is a 75 y.o. malewho presents to the clinic for evaluation and treatment of high risk feet. Janusz Montgomery Jr.   has a past medical history of NAT (acute kidney injury) (3/19/2017), ALLERGIC RHINITIS, Anemia, Anxiety, Basal cell carcinoma (10/19/2018), Basal cell carcinoma (01/09/2019), Chronic rhinitis (5/3/2013), Chronic rhinitis (5/3/2013), Coronary artery disease involving native coronary artery of native heart without angina pectoris s/p RCA stent, Cortical cataract of both eyes (3/18/2016), Delayed sleep phase syndrome (3/13/2019), Depression, Erectile dysfunction (3/24/2014), Erectile dysfunction (3/24/2014), Essential hypertension, GERD (gastroesophageal reflux disease) (7/25/2012), Gout, arthritis, Grade III open fracture of left tibia and fibula s/p ex-fix on 7/1/16 and ORIF of left tibia on 7/15 (7/6/2016), H/O: iritis, Helicobacter pylori (H. 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  The patient's  chief complaint is an anterior left leg wound.  One month duration.  Secondary to mild trauma in the presence of edema.  Has attempted self treatment but wound has not healed.  Non painful. Denies nausea, vomiting, fever, chills. History of left foot and ankle ulcers.  This patient has documented high risk feet requiring routine maintenance secondary to peripheral vascular disease.    12/10/2019  Patient relates he has attempted to care for leg ulcer as instructed but wound dimensions have not improved.    12/19/2019 patient relates that he has kept dressing clean dry and intact without itching or discomfort.  He has begun taking antibiotics as prescribed without nausea, vomiting, diarrhea, fever, chills.  No new pedal complaints.    12/31/19: F/u left leg ulceration. Has been in unna boot without issue.     01/07/2020 Patient relates that he has kept dressing clean dry and intact without itching or discomfort. He relates increased callus to posterior heel.  He denies nausea, vomiting, diarrhea, fever, chills.  No new pedal complaints.    01/14/2020  Patient relates that he has kept dressing clean dry and intact without itching however he relates that dressing was slipping and therefore somewhat uncomfortable this week.   He denies nausea, vomiting, diarrhea, fever, chills.  No new pedal complaints.    01/21/2020 patient relates that he has kept dressing clean, dry, intact.  He has no pedal complaints.  He does eyes nausea, vomiting, fever, chills.  He is anxious to get out of Unna boot.    1/29/20: F/u left leg ulceration. Has been wearing tubi . Pt. Is concerned regarding wound appearance.     02/06/2020 patient relates that he has kept dressing clean, dry, intact.  He has no pedal complaints.  He does eyes nausea, vomiting, fever, chills.  He relates that he has a cardiovascular procedure scheduled next week.     02/13/2020 patient relates that he has kept dressing clean, dry, intact.  He has no pedal  complaints.  He denies nausea, vomiting, fever, chills.        PCP: Miguelina Weathers MD    Date Last Seen by PCP:   Chief Complaint   Patient presents with    Wound Check     Dr Weathers PCP     Current shoe gear: tennis shoe    Hemoglobin A1C   Date Value Ref Range Status   02/11/2020 5.6 4.0 - 5.6 % Final     Comment:     ADA Screening Guidelines:  5.7-6.4%  Consistent with prediabetes  >or=6.5%  Consistent with diabetes  High levels of fetal hemoglobin interfere with the HbA1C  assay. Heterozygous hemoglobin variants (HbS, HgC, etc)do  not significantly interfere with this assay.   However, presence of multiple variants may affect accuracy.     07/11/2016 5.3 4.5 - 6.2 % Final     Comment:     According to ADA guidelines, hemoglobin A1C <7.0% represents  optimal control in non-pregnant diabetic patients.  Different  metrics may apply to specific populations.   Standards of Medical Care in Diabetes - 2016.  For the purpose of screening for the presence of diabetes:  <5.7%     Consistent with the absence of diabetes  5.7-6.4%  Consistent with increasing risk for diabetes   (prediabetes)  >or=6.5%  Consistent with diabetes  Currently no consensus exists for use of hemoglobin A1C  for diagnosis of diabetes for children.     03/31/2015 5.3 4.5 - 6.2 % Final         Current Outpatient Medications on File Prior to Visit   Medication Sig Dispense Refill    acyclovir (ZOVIRAX) 800 MG Tab Take 1 tablet (800 mg total) by mouth 2 (two) times daily. 180 tablet 6    apixaban (ELIQUIS) 5 mg Tab Take 1 tablet (5 mg total) by mouth 2 (two) times daily. 180 tablet 4    artificial tears (ISOPTO TEARS) 0.5 % ophthalmic solution Place 1 drop into both eyes as needed. (Patient taking differently: Place 1 drop into both eyes as needed (for dry eyes). )      atorvastatin (LIPITOR) 40 MG tablet Take 1 tablet (40 mg total) by mouth once daily. Please make a follow up visit with your doctor for more refills of this medication 90 tablet  0    azelastine (ASTELIN) 137 mcg (0.1 %) nasal spray 1 spray (137 mcg total) by Nasal route 2 (two) times daily. 30 mL 2    clopidogrel (PLAVIX) 75 mg tablet Take 1 tablet (75 mg total) by mouth once daily. 8 pills the first day 38 tablet 11    CYANOCOBALAMIN, VITAMIN B-12, (VITAMIN B-12 ORAL) Take 2,500 mcg by mouth once daily.      fluticasone propionate (FLONASE) 50 mcg/actuation nasal spray 1 spray (50 mcg total) by Each Nostril route once daily. 1 Bottle 0    furosemide (LASIX) 20 MG tablet Take 1 tablet (20 mg total) by mouth once daily. 90 tablet 0    melatonin 5 mg Tab Take 10 mg by mouth nightly.      multivitamin (MULTIPLE VITAMINS) per tablet Take 1 tablet by mouth once daily.      nitroGLYCERIN (NITROSTAT) 0.4 MG SL tablet Place 1 tablet (0.4 mg total) under the tongue every 5 (five) minutes as needed for Chest pain. 25 tablet 11    olmesartan (BENICAR) 20 MG tablet Take 1 tablet (20 mg total) by mouth once daily. (replaces ramipril for blood pressure) 90 tablet 1    oxyCODONE-acetaminophen (PERCOCET) 5-325 mg per tablet Take 1 tablet by mouth every 4 to 6 hours as needed for Pain. 20 tablet 0    tamsulosin (FLOMAX) 0.4 mg Cap Take 1 capsule (0.4 mg total) by mouth once daily. 90 capsule 3    venlafaxine (EFFEXOR) 75 MG tablet Take 2 tablets (150 mg total) by mouth 2 (two) times daily. 360 tablet 0    vitamin D 1000 units Tab Take 1 tablet (1,000 Units total) by mouth once daily.      albuterol (PROVENTIL/VENTOLIN HFA) 90 mcg/actuation inhaler Inhale 2 puffs into the lungs every 6 (six) hours as needed for Wheezing. Rescue 18 g 0    gabapentin (NEURONTIN) 300 MG capsule Take 1 capsule (300 mg total) by mouth 2 (two) times daily. (Patient taking differently: Take 300 mg by mouth 2 (two) times daily as needed. ) 60 capsule 11     No current facility-administered medications on file prior to visit.        Allergies   Allergen Reactions    Ciprofloxacin Rash     Diffuse pruritic morbilliform  "rash developed 3/15/2017 after dose of cipro; previously in 2/2017 he had rash/fevers after initiation of cipro    Zosyn [Piperacillin-Tazobactam] Anaphylaxis     Diffuse pruritic morbilliform rash developed 3/15/2017.  Then, 430am dose on 3/16 and rash worsened with SOB/tachypnea but no hypoxemia.     Bacitracin Itching and Rash     Violaceous rash in area of topical Tx.        Past Surgical History:   Procedure Laterality Date    Cardiac stenting x2      CATARACT EXTRACTION W/  INTRAOCULAR LENS IMPLANT Right 3/29/2016    Dr. Conteh    CATARACT EXTRACTION W/  INTRAOCULAR LENS IMPLANT Left 4/12/2016        EXTERNAL FIXATION TIBIAL FRACTURE Left 07/01/2016    INSERTION OF IMPLANTABLE LOOP RECORDER  04/07/2017    LEFT HEART CATHETERIZATION Left 1/30/2020    Procedure: Left heart cath;  Surgeon: Benjamin Sandhu MD;  Location: [facility] CATH LAB;  Service: Cardiology;  Laterality: Left;  RN PREOP 1/28/20--Pt starting Plavix loading dose today (8pills)- Dr. Sandhu aware.  Pt has a bandaged "non healing area to LLE"--Dr. Sandhu aware.    ORIF TIBIA FRACTURE Left 07/15/2016    RADIOACTIVE SEED IMPLANTATION OF PROSTATE N/A 8/8/2018    Procedure: INSERTION, RADIOACTIVE SEED, PROSTATE;  Surgeon: Bipin Thompson MD;  Location: [address];  Service: Urology;  Laterality: N/A;  1 hour    Squamous cell cancer removal x3 with Mohs surgery      TONSILLECTOMY      TOTAL KNEE ARTHROPLASTY  10/2012    trus/bx         Family History   Problem Relation Age of Onset    Skin cancer Father     Lung cancer Father     Cancer Father         smoker,     Alzheimer's disease Mother     Hypertension Mother     Cancer Sister         colon, lung cancer     No Known Problems Sister     Cancer Brother         skin cancer, polypectomy     Peripheral vascular disease Unknown     No Known Problems Maternal Aunt     No Known Problems Maternal Uncle     No Known Problems Paternal Aunt     No Known Problems " Paternal Uncle     No Known Problems Maternal Grandmother     No Known Problems Maternal Grandfather     No Known Problems Paternal Grandmother     No Known Problems Paternal Grandfather     Melanoma Neg Hx     Psoriasis Neg Hx     Lupus Neg Hx     Eczema Neg Hx     Amblyopia Neg Hx     Blindness Neg Hx     Cataracts Neg Hx     Diabetes Neg Hx     Glaucoma Neg Hx     Macular degeneration Neg Hx     Retinal detachment Neg Hx     Strabismus Neg Hx     Stroke Neg Hx     Thyroid disease Neg Hx     Acne Neg Hx        Social History     Socioeconomic History    Marital status:      Spouse name: Not on file    Number of children: Not on file    Years of education: Not on file    Highest education level: Not on file   Occupational History    Occupation: Retired    Social Needs    Financial resource strain: Not hard at all    Food insecurity:     Worry: Never true     Inability: Never true    Transportation needs:     Medical: No     Non-medical: No   Tobacco Use    Smoking status: Never Smoker    Smokeless tobacco: Never Used   Substance and Sexual Activity    Alcohol use: Yes     Frequency: 2-4 times a month     Drinks per session: 3 or 4     Binge frequency: Less than monthly     Comment: occasionally, beer    Drug use: Never    Sexual activity: Yes     Partners: Female   Lifestyle    Physical activity:     Days per week: Not on file     Minutes per session: Not on file    Stress: Not on file   Relationships    Social connections:     Talks on phone: Three times a week     Gets together: Once a week     Attends Judaism service: More than 4 times per year     Active member of club or organization: Yes     Attends meetings of clubs or organizations: More than 4 times per year     Relationship status:    Other Topics Concern    Not on file   Social History Narrative    Not on file       Review of Systems   Constitution: Negative for chills and fever.   Cardiovascular:  "Negative for claudication and leg swelling.   Respiratory: Negative for cough and shortness of breath.    Skin: Positive for dry skin and poor wound healing. Negative for itching and rash.   Musculoskeletal: Positive for arthritis, joint pain, joint swelling and myalgias. Negative for falls and muscle weakness.   Gastrointestinal: Negative for diarrhea, nausea and vomiting.   Neurological: Positive for paresthesias. Negative for numbness, tremors and weakness.   Psychiatric/Behavioral: Negative for altered mental status and hallucinations.         Objective:       Vitals:    02/13/20 1547   Weight: (!) 156.8 kg (345 lb 10.9 oz)   Height: 6' 1" (1.854 m)   PainSc: 0-No pain       Physical Exam   Constitutional:  Non-toxic appearance. He does not have a sickly appearance. No distress.   Pt. is well-developed, well-nourished, appears stated age, in no acute distress, alert and oriented x 3. No evidence of depression, anxiety, or agitation. Calm, cooperative, and communicative. Appropriate interactions and affect.   Cardiovascular:   Pulses:       Dorsalis pedis pulses are 2+ on the right side, and 2+ on the left side.        Posterior tibial pulses are 1+ on the right side, and 1+ on the left side.   There is decreased digital hair. Skin is atrophic, slightly hyperpigmented, and pitting edema nora (L>>R)     Pulmonary/Chest: No respiratory distress.   Musculoskeletal:        Right ankle: He exhibits swelling. No tenderness. No lateral malleolus, no medial malleolus, no AITFL, no CF ligament, no head of 5th metatarsal and no proximal fibula tenderness found. Achilles tendon exhibits no pain, no defect and normal Zabala's test results.        Left ankle: He exhibits swelling. No tenderness. No lateral malleolus, no medial malleolus, no AITFL, no CF ligament and no posterior TFL tenderness found. Achilles tendon exhibits no pain, no defect and normal Zabala's test results.        Right foot: There is no tenderness and " no bony tenderness.        Left foot: There is no tenderness and no bony tenderness.   Decreased stride, station of gait.  apropulsive toe off.  Increased angle and base of gait.    Patient has hammertoes of digits 2-5 bilateral partially reducible without symptom today.     There is equinus deformity bilateral. There is limitation of dorsiflexion with knees extended and with knees flexed.    Shoes reveals lateral heel counter wear bilateral in athletic shoes.        Lymphadenopathy:   No lymphatic streaking    Negative lymphadenopathy bilateral popliteal fossa and tarsal tunnel.     Neurological:   [place] 5.07 monofilament is intact bilateral feet. Sharp/dull sensation is also intact Bilateral feet. Proprioception is grossly intact. Vibratory sensation intact (pt able to sense vibration stop within 3-5 seconds)     Skin: Skin is warm and dry. Abrasion (left anterior leg as pictured, to level of subQ. local edema.  Granular base.  no fluctuance, no crepitus, no purulence) and lesion noted. No bruising, no burn, no laceration and no rash noted. He is not diaphoretic. No cyanosis or erythema. No pallor. Nails show no clubbing.   Ulcer location: medial left ankle  Signs of infection: none  Drainage: none  Purulence: no  Crepitus/fluctuance: no  Periwound: Reddened  Base: thin epithelial itssue  Depth: skin  Probe to bone: no      Ulcer location: posterior left heel  Signs of infection:none  Drainage:  None  Periwound: intact  Base: thin epithelial with bleeding within layers     Psychiatric: His mood appears not anxious. His affect is not inappropriate. His speech is not slurred. He is not combative. He is communicative. He is attentive.   Nursing note reviewed.    02/13/2020 02/06/20 1/31/20 01/21/20 01/14/20 01/07/20 12/31/19: Epic unable to load pic.     12/19/2019          12/10/19          11/14/19          Assessment:       Encounter  Diagnoses   Name Primary?    PVD (peripheral vascular disease) Yes    Venous stasis ulcer of left lower leg with edema of left lower leg     Venous stasis of lower extremity          Plan:       Janusz was seen today for wound check.    Diagnoses and all orders for this visit:    PVD (peripheral vascular disease)    Venous stasis ulcer of left lower leg with edema of left lower leg    Venous stasis of lower extremity      I counseled the patient on his conditions, their implications and medical management.    The site is cleansed of foreign material as much as possible. The patient is alerted to watch for any signs of infection (redness, pus, pain, increased swelling or fever) and call if such occurs.    Wound is improved, without signs of infection    endoform and coflex applied Detailed home care instructions dispensed.     Patient is to elevate legs. When sleeping, place a pillow under lower extremities. When sitting, support the legs so that they are level with the waist.    Follow-up:Patient is to return to the clinic in 1 weeks for follow-up but should call [facility] immediately if any signs of infection, such as fever, chills, sweats, increased redness or pain.    If wound does not improve he may require biopsy given his history of multiple skin cancers.  Discussed this with patient and he agrees.-- Declines biopsy today     Short-term goals include maintaining good offloading and minimizing bioburden, promoting granulation and epithelialization to healing.  Long-term goals include keeping the wound healed by good offloading and medical management under the direction of internist.

## 2020-02-14 ENCOUNTER — HOSPITAL ENCOUNTER (OUTPATIENT)
Facility: HOSPITAL | Age: 75
Discharge: HOME | End: 2020-02-15
Attending: INTERNAL MEDICINE | Admitting: INTERNAL MEDICINE
Payer: MEDICARE

## 2020-02-14 DIAGNOSIS — H02.59 FLOPPY EYELID SYNDROME: ICD-10-CM

## 2020-02-14 DIAGNOSIS — J98.4 RESTRICTIVE LUNG DISEASE: ICD-10-CM

## 2020-02-14 DIAGNOSIS — N13.8 BENIGN PROSTATIC HYPERPLASIA WITH URINARY OBSTRUCTION: ICD-10-CM

## 2020-02-14 DIAGNOSIS — C61 PROSTATE CANCER: ICD-10-CM

## 2020-02-14 DIAGNOSIS — I10 ESSENTIAL HYPERTENSION: Primary | ICD-10-CM

## 2020-02-14 DIAGNOSIS — E78.49 OTHER HYPERLIPIDEMIA: ICD-10-CM

## 2020-02-14 DIAGNOSIS — G47.33 OBSTRUCTIVE SLEEP APNEA: ICD-10-CM

## 2020-02-14 DIAGNOSIS — N40.1 BENIGN PROSTATIC HYPERPLASIA WITH URINARY OBSTRUCTION: ICD-10-CM

## 2020-02-14 DIAGNOSIS — K21.9 GASTROESOPHAGEAL REFLUX DISEASE, ESOPHAGITIS PRESENCE NOT SPECIFIED: ICD-10-CM

## 2020-02-14 DIAGNOSIS — I65.23 BILATERAL CAROTID ARTERY STENOSIS: ICD-10-CM

## 2020-02-14 DIAGNOSIS — F33.0 MAJOR DEPRESSIVE DISORDER, RECURRENT EPISODE, MILD: ICD-10-CM

## 2020-02-14 DIAGNOSIS — I73.9 PVD (PERIPHERAL VASCULAR DISEASE): ICD-10-CM

## 2020-02-14 DIAGNOSIS — F41.1 GENERALIZED ANXIETY DISORDER: ICD-10-CM

## 2020-02-14 DIAGNOSIS — Z87.39 HISTORY OF GOUT: ICD-10-CM

## 2020-02-14 DIAGNOSIS — M25.672 DECREASED RANGE OF MOTION OF LEFT ANKLE: ICD-10-CM

## 2020-02-14 DIAGNOSIS — R06.00 DYSPNEA, UNSPECIFIED TYPE: ICD-10-CM

## 2020-02-14 DIAGNOSIS — R26.9 GAIT ABNORMALITY: ICD-10-CM

## 2020-02-14 DIAGNOSIS — E66.01 MORBID OBESITY WITH BODY MASS INDEX OF 45.0-49.9 IN ADULT: ICD-10-CM

## 2020-02-14 DIAGNOSIS — Z87.09 PERSONAL HISTORY OF ASBESTOSIS: ICD-10-CM

## 2020-02-14 DIAGNOSIS — L97.321 CHRONIC ULCER OF LEFT ANKLE LIMITED TO BREAKDOWN OF SKIN: ICD-10-CM

## 2020-02-14 DIAGNOSIS — G47.21 DELAYED SLEEP PHASE SYNDROME: ICD-10-CM

## 2020-02-14 DIAGNOSIS — B00.52 HERPES SIMPLEX KERATOCONJUNCTIVITIS: ICD-10-CM

## 2020-02-14 DIAGNOSIS — R26.89 BALANCE PROBLEMS: ICD-10-CM

## 2020-02-14 DIAGNOSIS — Z74.09 IMPAIRED MOBILITY: ICD-10-CM

## 2020-02-14 DIAGNOSIS — I25.10 CORONARY ARTERY DISEASE INVOLVING NATIVE CORONARY ARTERY OF NATIVE HEART WITHOUT ANGINA PECTORIS: ICD-10-CM

## 2020-02-14 DIAGNOSIS — I48.0 PAROXYSMAL ATRIAL FIBRILLATION: ICD-10-CM

## 2020-02-14 DIAGNOSIS — R07.89 CHEST PAIN, ATYPICAL: ICD-10-CM

## 2020-02-14 DIAGNOSIS — G47.01 INSOMNIA DUE TO MEDICAL CONDITION: ICD-10-CM

## 2020-02-14 DIAGNOSIS — I25.118 CORONARY ARTERY DISEASE OF NATIVE ARTERY OF NATIVE HEART WITH STABLE ANGINA PECTORIS: ICD-10-CM

## 2020-02-14 DIAGNOSIS — R29.898 LEFT LEG WEAKNESS: ICD-10-CM

## 2020-02-14 DIAGNOSIS — I25.10 CAD (CORONARY ARTERY DISEASE): ICD-10-CM

## 2020-02-14 DIAGNOSIS — R60.0 BILATERAL LEG EDEMA: ICD-10-CM

## 2020-02-14 DIAGNOSIS — R29.898 RIGHT LEG WEAKNESS: ICD-10-CM

## 2020-02-14 PROCEDURE — C1753 CATH, INTRAVAS ULTRASOUND: HCPCS | Mod: HCNC | Performed by: INTERNAL MEDICINE

## 2020-02-14 PROCEDURE — 93452 LEFT HRT CATH W/VENTRCLGRPHY: CPT | Mod: HCNC | Performed by: INTERNAL MEDICINE

## 2020-02-14 PROCEDURE — 92928 PRQ TCAT PLMT NTRAC ST 1 LES: CPT | Mod: LM,HCNC,, | Performed by: INTERNAL MEDICINE

## 2020-02-14 PROCEDURE — 99152 PR MOD CONSCIOUS SEDATION, SAME PHYS, 5+ YRS, FIRST 15 MIN: ICD-10-PCS | Mod: HCNC,,, | Performed by: INTERNAL MEDICINE

## 2020-02-14 PROCEDURE — C1887 CATHETER, GUIDING: HCPCS | Mod: HCNC | Performed by: INTERNAL MEDICINE

## 2020-02-14 PROCEDURE — C1874 STENT, COATED/COV W/DEL SYS: HCPCS | Mod: HCNC | Performed by: INTERNAL MEDICINE

## 2020-02-14 PROCEDURE — 93452 PR LEFT HEART CATH INJECT VETRICULOGRAPHY, IMAGE SUPERVISE/INTERP: ICD-10-PCS | Mod: 26,51,HCNC, | Performed by: INTERNAL MEDICINE

## 2020-02-14 PROCEDURE — 25000003 PHARM REV CODE 250: Mod: HCNC | Performed by: INTERNAL MEDICINE

## 2020-02-14 PROCEDURE — C9600 PERC DRUG-EL COR STENT SING: HCPCS | Mod: LM,HCNC | Performed by: INTERNAL MEDICINE

## 2020-02-14 PROCEDURE — 27201423 OPTIME MED/SURG SUP & DEVICES STERILE SUPPLY: Mod: HCNC | Performed by: INTERNAL MEDICINE

## 2020-02-14 PROCEDURE — 92978 ENDOLUMINL IVUS OCT C 1ST: CPT | Mod: 26,LM,HCNC, | Performed by: INTERNAL MEDICINE

## 2020-02-14 PROCEDURE — 63600175 PHARM REV CODE 636 W HCPCS: Mod: HCNC | Performed by: INTERNAL MEDICINE

## 2020-02-14 PROCEDURE — 93452 LEFT HRT CATH W/VENTRCLGRPHY: CPT | Mod: 26,51,HCNC, | Performed by: INTERNAL MEDICINE

## 2020-02-14 PROCEDURE — 85347 COAGULATION TIME ACTIVATED: CPT | Mod: HCNC | Performed by: INTERNAL MEDICINE

## 2020-02-14 PROCEDURE — 25500020 PHARM REV CODE 255: Mod: HCNC | Performed by: INTERNAL MEDICINE

## 2020-02-14 PROCEDURE — 92978 PR IVUS, CORONARY, 1ST VESSEL: ICD-10-PCS | Mod: 26,LM,HCNC, | Performed by: INTERNAL MEDICINE

## 2020-02-14 PROCEDURE — C1769 GUIDE WIRE: HCPCS | Mod: HCNC | Performed by: INTERNAL MEDICINE

## 2020-02-14 PROCEDURE — 99153 MOD SED SAME PHYS/QHP EA: CPT | Mod: HCNC | Performed by: INTERNAL MEDICINE

## 2020-02-14 PROCEDURE — 92928 PR STENT: ICD-10-PCS | Mod: LM,HCNC,, | Performed by: INTERNAL MEDICINE

## 2020-02-14 PROCEDURE — C1894 INTRO/SHEATH, NON-LASER: HCPCS | Mod: HCNC | Performed by: INTERNAL MEDICINE

## 2020-02-14 PROCEDURE — C1725 CATH, TRANSLUMIN NON-LASER: HCPCS | Mod: HCNC | Performed by: INTERNAL MEDICINE

## 2020-02-14 PROCEDURE — 99152 MOD SED SAME PHYS/QHP 5/>YRS: CPT | Mod: HCNC,,, | Performed by: INTERNAL MEDICINE

## 2020-02-14 PROCEDURE — 99152 MOD SED SAME PHYS/QHP 5/>YRS: CPT | Mod: HCNC | Performed by: INTERNAL MEDICINE

## 2020-02-14 PROCEDURE — 92978 ENDOLUMINL IVUS OCT C 1ST: CPT | Mod: LM,HCNC | Performed by: INTERNAL MEDICINE

## 2020-02-14 DEVICE — STENT RONYX40012UX RESOLUTE ONYX 4.00X12
Type: IMPLANTABLE DEVICE | Site: CORONARY | Status: FUNCTIONAL
Brand: RESOLUTE ONYX™

## 2020-02-14 RX ORDER — SODIUM CHLORIDE 9 MG/ML
INJECTION, SOLUTION INTRAVENOUS CONTINUOUS
Status: DISCONTINUED | OUTPATIENT
Start: 2020-02-14 | End: 2020-02-14

## 2020-02-14 RX ORDER — SODIUM CHLORIDE 9 MG/ML
INJECTION, SOLUTION INTRAVENOUS CONTINUOUS
Status: DISCONTINUED | OUTPATIENT
Start: 2020-02-14 | End: 2020-02-15 | Stop reason: HOSPADM

## 2020-02-14 RX ORDER — ACETAMINOPHEN 325 MG/1
650 TABLET ORAL EVERY 4 HOURS PRN
Status: DISCONTINUED | OUTPATIENT
Start: 2020-02-14 | End: 2020-02-15 | Stop reason: HOSPADM

## 2020-02-14 RX ORDER — HEPARIN SODIUM 1000 [USP'U]/ML
INJECTION, SOLUTION INTRAVENOUS; SUBCUTANEOUS
Status: DISCONTINUED | OUTPATIENT
Start: 2020-02-14 | End: 2020-02-14 | Stop reason: HOSPADM

## 2020-02-14 RX ORDER — MIDAZOLAM HYDROCHLORIDE 1 MG/ML
INJECTION, SOLUTION INTRAMUSCULAR; INTRAVENOUS
Status: DISCONTINUED | OUTPATIENT
Start: 2020-02-14 | End: 2020-02-14 | Stop reason: HOSPADM

## 2020-02-14 RX ORDER — VERAPAMIL HYDROCHLORIDE 2.5 MG/ML
INJECTION, SOLUTION INTRAVENOUS
Status: DISCONTINUED | OUTPATIENT
Start: 2020-02-14 | End: 2020-02-14 | Stop reason: HOSPADM

## 2020-02-14 RX ORDER — ASPIRIN 325 MG
325 TABLET, DELAYED RELEASE (ENTERIC COATED) ORAL ONCE
Status: COMPLETED | OUTPATIENT
Start: 2020-02-14 | End: 2020-02-14

## 2020-02-14 RX ORDER — HYDROCODONE BITARTRATE AND ACETAMINOPHEN 5; 325 MG/1; MG/1
1 TABLET ORAL EVERY 4 HOURS PRN
Status: DISCONTINUED | OUTPATIENT
Start: 2020-02-14 | End: 2020-02-15 | Stop reason: HOSPADM

## 2020-02-14 RX ORDER — NITROGLYCERIN 20 MG/100ML
INJECTION INTRAVENOUS
Status: DISCONTINUED | OUTPATIENT
Start: 2020-02-14 | End: 2020-02-14 | Stop reason: HOSPADM

## 2020-02-14 RX ORDER — IODIXANOL 320 MG/ML
INJECTION, SOLUTION INTRAVASCULAR
Status: DISCONTINUED | OUTPATIENT
Start: 2020-02-14 | End: 2020-02-14 | Stop reason: HOSPADM

## 2020-02-14 RX ORDER — LIDOCAINE HYDROCHLORIDE 10 MG/ML
INJECTION, SOLUTION EPIDURAL; INFILTRATION; INTRACAUDAL; PERINEURAL
Status: DISCONTINUED | OUTPATIENT
Start: 2020-02-14 | End: 2020-02-14 | Stop reason: HOSPADM

## 2020-02-14 RX ORDER — ATORVASTATIN CALCIUM 40 MG/1
40 TABLET, FILM COATED ORAL NIGHTLY
Status: DISCONTINUED | OUTPATIENT
Start: 2020-02-14 | End: 2020-02-15 | Stop reason: HOSPADM

## 2020-02-14 RX ORDER — PHENYLEPHRINE HCL IN 0.9% NACL 1 MG/10 ML
SYRINGE (ML) INTRAVENOUS
Status: DISCONTINUED | OUTPATIENT
Start: 2020-02-14 | End: 2020-02-14 | Stop reason: HOSPADM

## 2020-02-14 RX ORDER — MORPHINE SULFATE 10 MG/ML
3 INJECTION INTRAMUSCULAR; INTRAVENOUS; SUBCUTANEOUS
Status: DISCONTINUED | OUTPATIENT
Start: 2020-02-14 | End: 2020-02-15 | Stop reason: HOSPADM

## 2020-02-14 RX ORDER — CLOPIDOGREL BISULFATE 75 MG/1
75 TABLET ORAL DAILY
Status: DISCONTINUED | OUTPATIENT
Start: 2020-02-15 | End: 2020-02-15 | Stop reason: HOSPADM

## 2020-02-14 RX ORDER — ATORVASTATIN CALCIUM 10 MG/1
10 TABLET, FILM COATED ORAL NIGHTLY
Status: DISCONTINUED | OUTPATIENT
Start: 2020-02-14 | End: 2020-02-14

## 2020-02-14 RX ORDER — FENTANYL CITRATE 50 UG/ML
INJECTION, SOLUTION INTRAMUSCULAR; INTRAVENOUS
Status: DISCONTINUED | OUTPATIENT
Start: 2020-02-14 | End: 2020-02-14 | Stop reason: HOSPADM

## 2020-02-14 RX ORDER — NAPROXEN SODIUM 220 MG/1
81 TABLET, FILM COATED ORAL DAILY
Status: DISCONTINUED | OUTPATIENT
Start: 2020-02-15 | End: 2020-02-15 | Stop reason: HOSPADM

## 2020-02-14 RX ADMIN — SODIUM CHLORIDE: 0.9 INJECTION, SOLUTION INTRAVENOUS at 11:02

## 2020-02-14 RX ADMIN — ATORVASTATIN CALCIUM 40 MG: 40 TABLET, FILM COATED ORAL at 08:02

## 2020-02-14 RX ADMIN — SODIUM CHLORIDE: 0.9 INJECTION, SOLUTION INTRAVENOUS at 03:02

## 2020-02-14 RX ADMIN — ASPIRIN 325 MG: 325 TABLET, DELAYED RELEASE ORAL at 11:02

## 2020-02-14 NOTE — NURSING
1400 Received patient from cath lab via stretcher. Alert and oriented times 4. Right wrist vasband intact. Fingers warm with palpable radial pulse. Instructed to not to move right arm and keep straight. Denies any pain. Respirations even unlabored.

## 2020-02-14 NOTE — Clinical Note
148 ml injected throughout the case. 52 mL total wasted during the case. 200 mL total used in the case.

## 2020-02-14 NOTE — Clinical Note
Pt appears to become diaphoretic and BP and HR dropped. Pt connected momentarily back to transducer to monitor AO pressure. Pt awake and alert talking.

## 2020-02-14 NOTE — NURSING
Dr. Brady made aware that pt stopped taking his eliquis 2 days ago as directed, has been taking his plavix, and has not been taking aspirin as directed after stopping eliquis. Orders received to give 325 mg aspirin now before procedure. [facility] Nurse Jillian made aware of orders.

## 2020-02-14 NOTE — NURSING
1700 Right wrist pressure device removed from after q15 minute air removal. No bleeding, sterile gauze pressure dressing to site. Radial pulses palpable bilaterally. Fingers warm and dry to touch. Instructed to call for any bleeding

## 2020-02-15 ENCOUNTER — PATIENT MESSAGE (OUTPATIENT)
Dept: CARDIOLOGY | Facility: CLINIC | Age: 75
End: 2020-02-15

## 2020-02-15 VITALS
RESPIRATION RATE: 18 BRPM | HEART RATE: 71 BPM | TEMPERATURE: 98 F | BODY MASS INDEX: 41.75 KG/M2 | WEIGHT: 315 LBS | HEIGHT: 73 IN | SYSTOLIC BLOOD PRESSURE: 139 MMHG | OXYGEN SATURATION: 98 % | DIASTOLIC BLOOD PRESSURE: 64 MMHG

## 2020-02-15 PROCEDURE — 63600175 PHARM REV CODE 636 W HCPCS: Mod: HCNC | Performed by: INTERNAL MEDICINE

## 2020-02-15 RX ORDER — CLOPIDOGREL BISULFATE 75 MG/1
75 TABLET ORAL DAILY
Status: DISCONTINUED | OUTPATIENT
Start: 2020-02-15 | End: 2020-02-15 | Stop reason: SDUPTHER

## 2020-02-15 RX ORDER — ATORVASTATIN CALCIUM 40 MG/1
40 TABLET, FILM COATED ORAL DAILY
Status: DISCONTINUED | OUTPATIENT
Start: 2020-02-15 | End: 2020-02-15 | Stop reason: SDUPTHER

## 2020-02-15 RX ORDER — VENLAFAXINE 37.5 MG/1
150 TABLET ORAL 2 TIMES DAILY
Status: DISCONTINUED | OUTPATIENT
Start: 2020-02-15 | End: 2020-02-15 | Stop reason: HOSPADM

## 2020-02-15 RX ORDER — FLUTICASONE PROPIONATE 50 MCG
1 SPRAY, SUSPENSION (ML) NASAL DAILY
Status: DISCONTINUED | OUTPATIENT
Start: 2020-02-15 | End: 2020-02-15 | Stop reason: HOSPADM

## 2020-02-15 RX ORDER — NITROGLYCERIN 0.4 MG/1
0.4 TABLET SUBLINGUAL EVERY 5 MIN PRN
Status: DISCONTINUED | OUTPATIENT
Start: 2020-02-15 | End: 2020-02-15 | Stop reason: HOSPADM

## 2020-02-15 RX ORDER — AZELASTINE 1 MG/ML
1 SPRAY, METERED NASAL 2 TIMES DAILY
Status: DISCONTINUED | OUTPATIENT
Start: 2020-02-15 | End: 2020-02-15 | Stop reason: HOSPADM

## 2020-02-15 RX ORDER — ALBUTEROL SULFATE 90 UG/1
2 AEROSOL, METERED RESPIRATORY (INHALATION) EVERY 6 HOURS PRN
Status: DISCONTINUED | OUTPATIENT
Start: 2020-02-15 | End: 2020-02-15 | Stop reason: HOSPADM

## 2020-02-15 RX ORDER — CHOLECALCIFEROL (VITAMIN D3) 25 MCG
1000 TABLET ORAL DAILY
Status: DISCONTINUED | OUTPATIENT
Start: 2020-02-15 | End: 2020-02-15 | Stop reason: HOSPADM

## 2020-02-15 RX ORDER — FUROSEMIDE 20 MG/1
20 TABLET ORAL DAILY
Status: DISCONTINUED | OUTPATIENT
Start: 2020-02-15 | End: 2020-02-15 | Stop reason: HOSPADM

## 2020-02-15 RX ORDER — TAMSULOSIN HYDROCHLORIDE 0.4 MG/1
1 CAPSULE ORAL DAILY
Status: DISCONTINUED | OUTPATIENT
Start: 2020-02-15 | End: 2020-02-15 | Stop reason: HOSPADM

## 2020-02-15 RX ORDER — OLMESARTAN MEDOXOMIL 20 MG/1
20 TABLET ORAL DAILY
Status: DISCONTINUED | OUTPATIENT
Start: 2020-02-15 | End: 2020-02-15 | Stop reason: HOSPADM

## 2020-02-15 RX ADMIN — SODIUM CHLORIDE: 0.9 INJECTION, SOLUTION INTRAVENOUS at 03:02

## 2020-02-15 NOTE — PROGRESS NOTES
WRITTEN DISCHARGE INSTRUCTIONS: 322  Follow-up Information     Miguel Angel Brady MD. Go on 3/2/2020.    Specialties:  Cardiology, INTERVENTIONAL CARDIOLOGY  Why:  9:40am  Hospital Follow-Up with Dr. Brady scheduled  Contact information:  [address]  [address]  [place] [address]  [phone]               HELP AT HOME: Ensure that you have someone to Help you manage your care at home after discharge.    [phone] After discharge for assistance with [facility] On-Call Nurse Care Line open 24/7 for assistance.    Things you are responsible for to Manage Your Care at Home:  1. Getting your prescriptions filled or picking up those prescriptions that have been called in for you.  2. Taking your medication as directed; DO NOT MISS ANY DOSES!  3. Going to your follow-up doctor appointment(s).  This is important because it allows your doctor to monitor your progress and determine if any changes need to be made to your treatment plan.    Thanks for choosing [facility] for your care. Please answer any calls you may receive from [facility]. We want to continue to support you as you manage your healthcare needs.  We are happy to have the opportunity to serve you.

## 2020-02-15 NOTE — PLAN OF CARE
02/15/20 0906   Post-Acute Status   Post-Acute Authorization Other  (Home with follow-ups)   Other Status No Post-Acute Service Needs   Discharge Delays None known at this time   Discharge Plan   Discharge Plan A Home with family   Discharge Plan B Other  (TBD)

## 2020-02-15 NOTE — NURSING
Pt discharged home.  Pt teaching and dc instructions given including follow up appointments.  Pt verbalized understanding.  Tele removed.  IV removed tip intact.  Pt tolerated well.  Pt up in chair.  Call light in reach. Awaiting ride home.

## 2020-02-15 NOTE — PLAN OF CARE
02/15/20 0907   Final Note   Assessment Type Final Discharge Note   Anticipated Discharge Disposition Home   What phone number can be called within the next 1-3 days to see how you are doing after discharge?   ([phone])   Hospital Follow Up  Appt(s) scheduled? Yes   Discharge plans and expectations educations in teach back method with documentation complete? Yes   Right Care Referral Info   Post Acute Recommendation No Care

## 2020-02-15 NOTE — PLAN OF CARE
02/15/20 0911   Discharge Assessment   Assessment Type Discharge Planning Assessment   Confirmed/corrected address and phone number on facesheet? Yes   Assessment information obtained from? Patient   Communicated expected length of stay with patient/caregiver no   Prior to hospitilization cognitive status: Alert/Oriented   Prior to hospitalization functional status: Independent;Assistive Equipment  (CPAP; transport chair)   Current cognitive status: Alert/Oriented   Current Functional Status: Independent;Assistive Equipment  (CPAP; transport chair)   Facility Arrived From: Home   Lives With spouse   Able to Return to Prior Arrangements yes   Is patient able to care for self after discharge? Yes   Who are your caregiver(s) and their phone number(s)? Damiánspouse: [phone]   Patient's perception of discharge disposition home or selfcare   Readmission Within the Last 30 Days no previous admission in last 30 days   Patient currently being followed by outpatient case management? No   Patient currently receives any other outside agency services? No   Equipment Currently Used at Home CPAP;other (see comments)  (transport chair)   Do you have any problems affording any of your prescribed medications? No   Is the patient taking medications as prescribed? yes   Does the patient have transportation home? Yes   Transportation Anticipated car, drives self;family or friend will provide   Does the patient receive services at the [facility]madin Clinic? No   Discharge Plan A Home with family   Discharge Plan B Other  (TBD)   DME Needed Upon Discharge  other (see comments)  (TBD)   Patient/Family in Agreement with Plan yes   SW Role explained to patient; two patient identifiers recognized; SW contact information placed on Communication board. Discussed patient managing health care at home; determined who would be helping patient at home with recovery: Spouse will help at home    PCP: Miguelina Weathers MD Prefers morning    Extended  Emergency Contact Information  Primary Emergency Contact: Bri Montgomery  Address: [address] [place] [address] [address] [address] [place]  Home Phone: [phone]  Mobile Phone: [phone]  Relation: Spouse     [facility] [address] [facility] [address] [facility] [address] [facility]  [address] [facility]  [place] [address]  Phone: [phone] Fax: [phone]    Payor: [employer] MANAGED MEDICARE / Plan: [employer] MEDICARE HMO / Product Type: Capitation /

## 2020-02-15 NOTE — PLAN OF CARE
02/15/20 1056   Medicare Message   Important Message from Medicare regarding Discharge Appeal Rights Given to patient/caregiver;Explained to patient/caregiver;Signed/date by patient/caregiver;Other (comments)   Date IMM was signed 02/15/20   Time IMM was signed [id number]

## 2020-02-15 NOTE — DISCHARGE SUMMARY
[facility]  Cardiology  Discharge Summary      Patient Name: Janusz Montgomery Jr.  MRN: [id number]  Admission Date: 2/14/2020  Hospital Length of Stay: 0 days  Discharge Date and Time:  02/15/2020 8:30 AM  Attending Physician: Miguel Angel Brady MD  Discharging Provider: Ramón Joyner MD  Primary Care Physician: Miguelina Weathers MD    HPI:  Janusz Montgomery junior is a 75-year-old male who presented for percutaneous coronary intervention on 02/14/2020 by Dr. Brady.  Patient had initially seen Dr. Sandhu with complaints of dyspnea on exertion as well as chest discomfort.  He underwent coronary angiography by Dr. Sandhu and was noted to have a distal left main stenosis and ostial 90% left anterior descending stenosis.  Dr. Brady performed IVUS.  IVUS images revealed stenosis extending from the distal left main into the ostial LAD without any significant ostial circumflex disease.    Procedure(s) (LRB):  Left heart cath, IVUS guided left main / LAD PCI. Noon start, radial approach (Left)  Percutaneous coronary intervention (N/A)  IVUS, Coronary  Ultrasound Guidance  Stent, Drug Eluting, Single Vessel, Coronary     Indwelling Lines/Drains at time of discharge:  Lines/Drains/Airways     None                 Hospital Course (synopsis of major diagnoses, care, treatment, and services provided during the course of the hospital stay):   Patient underwent a successful percutaneous coronary intervention with IVUS guidance.  4 x 12 mm POLO was implanted and post dilated to greater than 4.5 mm.        Significant Diagnostic Studies: Angiography: Patient underwent a successful percutaneous coronary intervention with IVUS guidance.  4 x 12 mm POLO was implanted and post dilated to greater than 4.5 mm.      Pending Diagnostic Studies:     None          Final Active Diagnoses:    Diagnosis Date Noted POA    PRINCIPAL PROBLEM:  CAD (coronary artery disease) [I25.10] 02/04/2020 Yes      Problems Resolved During this Admission:        Discharged Condition: good    Follow Up:  Follow-up Information     Miguel Angel Brady MD In 1 week.    Specialties:  Cardiology, INTERVENTIONAL CARDIOLOGY  Contact information:  [address]  [address]  [place] [id number] [phone]                 Patient Instructions:   No discharge procedures on file.  Medications:  Reconciled Home Medications:      Medication List      CHANGE how you take these medications    artificial tears 0.5 % ophthalmic solution  Commonly known as:  ISOPTO TEARS  Place 1 drop into both eyes as needed.  What changed:  reasons to take this     gabapentin 300 MG capsule  Commonly known as:  NEURONTIN  Take 1 capsule (300 mg total) by mouth 2 (two) times daily.  What changed:    · when to take this  · reasons to take this        CONTINUE taking these medications    acyclovir 800 MG Tab  Commonly known as:  ZOVIRAX  Take 1 tablet (800 mg total) by mouth 2 (two) times daily.     albuterol 90 mcg/actuation inhaler  Commonly known as:  PROVENTIL/VENTOLIN HFA  Inhale 2 puffs into the lungs every 6 (six) hours as needed for Wheezing. Rescue     apixaban 5 mg Tab  Commonly known as:  ELIQUIS  Take 1 tablet (5 mg total) by mouth 2 (two) times daily.     atorvastatin 40 MG tablet  Commonly known as:  LIPITOR  Take 1 tablet (40 mg total) by mouth once daily. Please make a follow up visit with your doctor for more refills of this medication     azelastine 137 mcg (0.1 %) nasal spray  Commonly known as:  ASTELIN  1 spray (137 mcg total) by Nasal route 2 (two) times daily.     clopidogreL 75 mg tablet  Commonly known as:  PLAVIX  Take 1 tablet (75 mg total) by mouth once daily. 8 pills the first day     fluticasone propionate 50 mcg/actuation nasal spray  Commonly known as:  FLONASE  1 spray (50 mcg total) by Each Nostril route once daily.     furosemide 20 MG tablet  Commonly known as:  LASIX  Take 1 tablet (20 mg total) by mouth once daily.     melatonin  Commonly known as:  MELATIN  Take 10 mg by  mouth nightly.     Multiple Vitamins per tablet  Generic drug:  multivitamin  Take 1 tablet by mouth once daily.     nitroGLYCERIN 0.4 MG SL tablet  Commonly known as:  NITROSTAT  Place 1 tablet (0.4 mg total) under the tongue every 5 (five) minutes as needed for Chest pain.     olmesartan 20 MG tablet  Commonly known as:  BENICAR  Take 1 tablet (20 mg total) by mouth once daily. (replaces ramipril for blood pressure)     oxyCODONE-acetaminophen 5-325 mg per tablet  Commonly known as:  Percocet  Take 1 tablet by mouth every 4 to 6 hours as needed for Pain.     tamsulosin 0.4 mg Cap  Commonly known as:  FLOMAX  Take 1 capsule (0.4 mg total) by mouth once daily.     venlafaxine 75 MG tablet  Commonly known as:  EFFEXOR  Take 2 tablets (150 mg total) by mouth 2 (two) times daily.     VITAMIN B-12 ORAL  Take 2,500 mcg by mouth once daily.     vitamin D 1000 units Tab  Commonly known as:  VITAMIN D3  Take 1 tablet (1,000 Units total) by mouth once daily.            Time spent on the discharge of patient: 30 minutes    Ramón Joyner MD  Cardiology  [facility]

## 2020-02-17 ENCOUNTER — OFFICE VISIT (OUTPATIENT)
Dept: FAMILY MEDICINE | Facility: CLINIC | Age: 75
End: 2020-02-17
Payer: MEDICARE

## 2020-02-17 VITALS
SYSTOLIC BLOOD PRESSURE: 100 MMHG | HEART RATE: 79 BPM | DIASTOLIC BLOOD PRESSURE: 52 MMHG | OXYGEN SATURATION: 95 % | WEIGHT: 315 LBS | BODY MASS INDEX: 41.75 KG/M2 | TEMPERATURE: 98 F | HEIGHT: 73 IN

## 2020-02-17 DIAGNOSIS — R51.9 NONINTRACTABLE HEADACHE, UNSPECIFIED CHRONICITY PATTERN, UNSPECIFIED HEADACHE TYPE: ICD-10-CM

## 2020-02-17 DIAGNOSIS — C61 PROSTATE CANCER: ICD-10-CM

## 2020-02-17 DIAGNOSIS — E78.49 OTHER HYPERLIPIDEMIA: ICD-10-CM

## 2020-02-17 DIAGNOSIS — F33.0 MAJOR DEPRESSIVE DISORDER, RECURRENT EPISODE, MILD: ICD-10-CM

## 2020-02-17 DIAGNOSIS — Z23 NEED FOR TDAP VACCINATION: ICD-10-CM

## 2020-02-17 DIAGNOSIS — R14.0 BLOATING: ICD-10-CM

## 2020-02-17 DIAGNOSIS — I25.10 CORONARY ARTERY DISEASE INVOLVING NATIVE CORONARY ARTERY OF NATIVE HEART WITHOUT ANGINA PECTORIS: ICD-10-CM

## 2020-02-17 DIAGNOSIS — E66.01 MORBID OBESITY WITH BODY MASS INDEX OF 45.0-49.9 IN ADULT: ICD-10-CM

## 2020-02-17 DIAGNOSIS — I48.0 PAROXYSMAL ATRIAL FIBRILLATION: ICD-10-CM

## 2020-02-17 DIAGNOSIS — Z12.11 COLON CANCER SCREENING: ICD-10-CM

## 2020-02-17 DIAGNOSIS — Z23 NEED FOR SHINGLES VACCINE: ICD-10-CM

## 2020-02-17 DIAGNOSIS — I10 ESSENTIAL HYPERTENSION: Primary | ICD-10-CM

## 2020-02-17 DIAGNOSIS — I65.23 BILATERAL CAROTID ARTERY STENOSIS: ICD-10-CM

## 2020-02-17 DIAGNOSIS — R06.02 SHORTNESS OF BREATH: ICD-10-CM

## 2020-02-17 LAB
POC ACTIVATED CLOTTING TIME K: 285 SEC (ref 74–137)
POC ACTIVATED CLOTTING TIME K: 301 SEC (ref 74–137)
SAMPLE: ABNORMAL
SAMPLE: ABNORMAL

## 2020-02-17 PROCEDURE — 1101F PT FALLS ASSESS-DOCD LE1/YR: CPT | Mod: HCNC,CPTII,S$GLB, | Performed by: INTERNAL MEDICINE

## 2020-02-17 PROCEDURE — 3074F SYST BP LT 130 MM HG: CPT | Mod: HCNC,CPTII,S$GLB, | Performed by: INTERNAL MEDICINE

## 2020-02-17 PROCEDURE — 1159F PR MEDICATION LIST DOCUMENTED IN MEDICAL RECORD: ICD-10-PCS | Mod: HCNC,S$GLB,, | Performed by: INTERNAL MEDICINE

## 2020-02-17 PROCEDURE — 99214 OFFICE O/P EST MOD 30 MIN: CPT | Mod: HCNC,S$GLB,, | Performed by: INTERNAL MEDICINE

## 2020-02-17 PROCEDURE — 1159F MED LIST DOCD IN RCRD: CPT | Mod: HCNC,S$GLB,, | Performed by: INTERNAL MEDICINE

## 2020-02-17 PROCEDURE — 99214 PR OFFICE/OUTPT VISIT, EST, LEVL IV, 30-39 MIN: ICD-10-PCS | Mod: HCNC,S$GLB,, | Performed by: INTERNAL MEDICINE

## 2020-02-17 PROCEDURE — 99499 UNLISTED E&M SERVICE: CPT | Mod: HCNC,S$GLB,, | Performed by: INTERNAL MEDICINE

## 2020-02-17 PROCEDURE — 1126F PR PAIN SEVERITY QUANTIFIED, NO PAIN PRESENT: ICD-10-PCS | Mod: HCNC,S$GLB,, | Performed by: INTERNAL MEDICINE

## 2020-02-17 PROCEDURE — 3078F PR MOST RECENT DIASTOLIC BLOOD PRESSURE < 80 MM HG: ICD-10-PCS | Mod: HCNC,CPTII,S$GLB, | Performed by: INTERNAL MEDICINE

## 2020-02-17 PROCEDURE — 1101F PR PT FALLS ASSESS DOC 0-1 FALLS W/OUT INJ PAST YR: ICD-10-PCS | Mod: HCNC,CPTII,S$GLB, | Performed by: INTERNAL MEDICINE

## 2020-02-17 PROCEDURE — 3078F DIAST BP <80 MM HG: CPT | Mod: HCNC,CPTII,S$GLB, | Performed by: INTERNAL MEDICINE

## 2020-02-17 PROCEDURE — 99499 RISK ADDL DX/OHS AUDIT: ICD-10-PCS | Mod: HCNC,S$GLB,, | Performed by: INTERNAL MEDICINE

## 2020-02-17 PROCEDURE — 3074F PR MOST RECENT SYSTOLIC BLOOD PRESSURE < 130 MM HG: ICD-10-PCS | Mod: HCNC,CPTII,S$GLB, | Performed by: INTERNAL MEDICINE

## 2020-02-17 PROCEDURE — 99999 PR PBB SHADOW E&M-EST. PATIENT-LVL III: ICD-10-PCS | Mod: PBBFAC,HCNC,, | Performed by: INTERNAL MEDICINE

## 2020-02-17 PROCEDURE — 99999 PR PBB SHADOW E&M-EST. PATIENT-LVL III: CPT | Mod: PBBFAC,HCNC,, | Performed by: INTERNAL MEDICINE

## 2020-02-17 PROCEDURE — 1126F AMNT PAIN NOTED NONE PRSNT: CPT | Mod: HCNC,S$GLB,, | Performed by: INTERNAL MEDICINE

## 2020-02-17 RX ORDER — ALBUTEROL SULFATE 90 UG/1
2 AEROSOL, METERED RESPIRATORY (INHALATION) EVERY 6 HOURS PRN
Qty: 18 G | Refills: 0 | Status: SHIPPED | OUTPATIENT
Start: 2020-02-17 | End: 2021-03-23 | Stop reason: SDUPTHER

## 2020-02-17 NOTE — INTERVAL H&P NOTE
The patient has been examined and the H&P has been reviewed:    I concur with the findings and no changes have occurred since H&P was written.    Anesthesia/Surgery risks, benefits and alternative options discussed and understood by patient/family.          Active Hospital Problems    Diagnosis  POA    *CAD (coronary artery disease) [I25.10]  Yes     Priority: 1 - High      Resolved Hospital Problems   No resolved problems to display.

## 2020-02-17 NOTE — PROGRESS NOTES
HISTORY OF PRESENT ILLNESS:  Janusz Montgomery Jr. is a 75 y.o. male who presents to the clinic today for Shortness of Breath; Hypertension; Hyperlipidemia; Medication Refill; and Memory Loss  .   The patient presents to clinic today for follow-up of his hypertension/coronary artery disease/paroxysmal atrial fibrillation with bilateral carotid artery disease, hyperlipidemia, and depression.  The patient had been having problems with shortness of breath.  He was found to have a cardiac artery blockage.  He was originally evaluated for CABG, but was not deemed a good candidate due to his weight.  He underwent successful stenting 3 days ago.  He states in some ways his shortness of breath is improved.  He does still have some shortness of breath due to deconditioning.  His primary complaints today are intermittent headache.  He wants to know what he can take for his symptoms.  He also reports bloating that occurs quickly with whatever he eats.  Unfortunately, he continues to be hungry.  He is in the process of trying to improve his dietary habits as well as his exercise habits.  He is aware of the need for significant weight loss.  He still has a wrapping on his left lower extremity.  He states that the ulcer there has pretty much completely healed.  He prefers to swim for exercise and hopes he can get back to that soon.  He has poor sleep habits.  He states he sometimes sleeps 9-12 hours a day.  He does report compliance with his CPAP machine.      PAST MEDICAL HISTORY:  Past Medical History:   Diagnosis Date    NAT (acute kidney injury) 3/19/2017    ALLERGIC RHINITIS     Anemia     Anxiety     Basal cell carcinoma 10/19/2018    forehead and right medial shoulder    Basal cell carcinoma 01/09/2019    left nasal bridge and left posterior ear    Chronic rhinitis 5/3/2013    Chronic rhinitis 5/3/2013    Coronary artery disease involving native coronary artery of native heart without angina pectoris s/p RCA stent   "   Cortical cataract of both eyes 3/18/2016    Delayed sleep phase syndrome 3/13/2019    Depression     Erectile dysfunction 3/24/2014    Erectile dysfunction 3/24/2014    Essential hypertension     GERD (gastroesophageal reflux disease) 7/25/2012    Gout, arthritis     Grade III open fracture of left tibia and fibula s/p ex-fix on 7/1/16 and ORIF of left tibia on 7/15 7/6/2016    H/O: iritis     Helicobacter pylori (H. pylori) infection     Treated    Herpes simplex keratoconjunctivitis 9/30/2015    - on acyclovir - followed by opthalmology, Dr. Uribe     Herpes simplex keratoconjunctivitis 9/30/2015    - on acyclovir - followed by opthalmology, Dr. Uribe     Hyperkalemia 2/28/2017    Hyperlipidemia     Hypogonadism male     Hypogonadism male     Mixed anxiety and depressive disorder     Morbid obesity     Obstructive sleep apnea on CPAP     CPAP    Osteoarthritis of left knee 7/25/2012    Paroxysmal atrial fibrillation 7/6/2016    Primary osteoarthritis of left knee 7/25/2012    Prominent aorta 1/25/2016    "RESULTS: THE HEART IS MILDLY ENLARGED WITH A SLIGHTLY PROMINENT AORTA" - Xray Chest PA & Lateral 12-     Prostate cancer 2/15/2016    - followed by urology, Dr. Young     Prostate cancer 2/15/2016    - followed by urology, Dr. 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 Conteh " "   CATARACT EXTRACTION W/  INTRAOCULAR LENS IMPLANT Left 4/12/2016        EXTERNAL FIXATION TIBIAL FRACTURE Left 07/01/2016    INSERTION OF IMPLANTABLE LOOP RECORDER  04/07/2017    LEFT HEART CATHETERIZATION Left 1/30/2020    Procedure: Left heart cath;  Surgeon: Benjamin Sandhu MD;  Location: [facility] CATH LAB;  Service: Cardiology;  Laterality: Left;  RN PREOP 1/28/20--Pt starting Plavix loading dose today (8pills)- Dr. Sandhu aware.  Pt has a bandaged "non healing area to LLE"--Dr. Sandhu aware.    ORIF TIBIA FRACTURE Left 07/15/2016    RADIOACTIVE SEED IMPLANTATION OF PROSTATE N/A 8/8/2018    Procedure: INSERTION, RADIOACTIVE SEED, PROSTATE;  Surgeon: Bipin Thompson MD;  Location: [address];  Service: Urology;  Laterality: N/A;  1 hour    Squamous cell cancer removal x3 with Mohs surgery      TONSILLECTOMY      TOTAL KNEE ARTHROPLASTY  10/2012    trus/bx         SOCIAL HISTORY:  Social History     Socioeconomic History    Marital status:      Spouse name: Not on file    Number of children: Not on file    Years of education: Not on file    Highest education level: Not on file   Occupational History    Occupation: Retired    Social Needs    Financial resource strain: Not hard at all    Food insecurity:     Worry: Never true     Inability: Never true    Transportation needs:     Medical: No     Non-medical: No   Tobacco Use    Smoking status: Never Smoker    Smokeless tobacco: Never Used   Substance and Sexual Activity    Alcohol use: Yes     Frequency: 2-4 times a month     Drinks per session: 3 or 4     Binge frequency: Less than monthly     Comment: occasionally, beer    Drug use: Never    Sexual activity: Yes     Partners: Female   Lifestyle    Physical activity:     Days per week: Not on file     Minutes per session: Not on file    Stress: Not on file   Relationships    Social connections:     Talks on phone: Three times a week     Gets together: Once a week     " Attends Congregation service: More than 4 times per year     Active member of club or organization: Yes     Attends meetings of clubs or organizations: More than 4 times per year     Relationship status:    Other Topics Concern    Not on file   Social History Narrative    Not on file       FAMILY HISTORY:  Family History   Problem Relation Age of Onset    Skin cancer Father     Lung cancer Father     Cancer Father         smoker,     Alzheimer's disease Mother     Hypertension Mother     Cancer Sister         colon, lung cancer     No Known Problems Sister     Cancer Brother         skin cancer, polypectomy     Peripheral vascular disease Unknown     No Known Problems Maternal Aunt     No Known Problems Maternal Uncle     No Known Problems Paternal Aunt     No Known Problems Paternal Uncle     No Known Problems Maternal Grandmother     No Known Problems Maternal Grandfather     No Known Problems Paternal Grandmother     No Known Problems Paternal Grandfather     Melanoma Neg Hx     Psoriasis Neg Hx     Lupus Neg Hx     Eczema Neg Hx     Amblyopia Neg Hx     Blindness Neg Hx     Cataracts Neg Hx     Diabetes Neg Hx     Glaucoma Neg Hx     Macular degeneration Neg Hx     Retinal detachment Neg Hx     Strabismus Neg Hx     Stroke Neg Hx     Thyroid disease Neg Hx     Acne Neg Hx        ALLERGIES AND MEDICATIONS: updated and reviewed.  Review of patient's allergies indicates:   Allergen Reactions    Ciprofloxacin Rash     Diffuse pruritic morbilliform rash developed 3/15/2017 after dose of cipro; previously in 2/2017 he had rash/fevers after initiation of cipro    Zosyn [piperacillin-tazobactam] Rash     Diffuse pruritic morbilliform rash developed 3/15/2017.  Then, 430am dose on 3/16 and rash worsened with SOB/tachypnea but no hypoxemia.     Bacitracin Itching and Rash     Violaceous rash in area of topical Tx.      Medication List with Changes/Refills   Current Medications     ACYCLOVIR (ZOVIRAX) 800 MG TAB    Take 1 tablet (800 mg total) by mouth 2 (two) times daily.    APIXABAN (ELIQUIS) 5 MG TAB    Take 1 tablet (5 mg total) by mouth 2 (two) times daily.    ARTIFICIAL TEARS (ISOPTO TEARS) 0.5 % OPHTHALMIC SOLUTION    Place 1 drop into both eyes as needed.    ATORVASTATIN (LIPITOR) 40 MG TABLET    Take 1 tablet (40 mg total) by mouth once daily. Please make a follow up visit with your doctor for more refills of this medication    AZELASTINE (ASTELIN) 137 MCG (0.1 %) NASAL SPRAY    1 spray (137 mcg total) by Nasal route 2 (two) times daily.    CLOPIDOGREL (PLAVIX) 75 MG TABLET    Take 1 tablet (75 mg total) by mouth once daily. 8 pills the first day    CYANOCOBALAMIN, VITAMIN B-12, (VITAMIN B-12 ORAL)    Take 2,500 mcg by mouth once daily.    FLUTICASONE PROPIONATE (FLONASE) 50 MCG/ACTUATION NASAL SPRAY    1 spray (50 mcg total) by Each Nostril route once daily.    FUROSEMIDE (LASIX) 20 MG TABLET    Take 1 tablet (20 mg total) by mouth once daily.    GABAPENTIN (NEURONTIN) 300 MG CAPSULE    Take 1 capsule (300 mg total) by mouth 2 (two) times daily.    MELATONIN 5 MG TAB    Take 10 mg by mouth nightly.    MULTIVITAMIN (MULTIPLE VITAMINS) PER TABLET    Take 1 tablet by mouth once daily.    NITROGLYCERIN (NITROSTAT) 0.4 MG SL TABLET    Place 1 tablet (0.4 mg total) under the tongue every 5 (five) minutes as needed for Chest pain.    OLMESARTAN (BENICAR) 20 MG TABLET    Take 1 tablet (20 mg total) by mouth once daily. (replaces ramipril for blood pressure)    OXYCODONE-ACETAMINOPHEN (PERCOCET) 5-325 MG PER TABLET    Take 1 tablet by mouth every 4 to 6 hours as needed for Pain.    TAMSULOSIN (FLOMAX) 0.4 MG CAP    Take 1 capsule (0.4 mg total) by mouth once daily.    VENLAFAXINE (EFFEXOR) 75 MG TABLET    Take 2 tablets (150 mg total) by mouth 2 (two) times daily.    VITAMIN D 1000 UNITS TAB    Take 1 tablet (1,000 Units total) by mouth once daily.   Changed and/or Refilled Medications     Modified Medication Previous Medication    ALBUTEROL (PROVENTIL/VENTOLIN HFA) 90 MCG/ACTUATION INHALER albuterol (PROVENTIL/VENTOLIN HFA) 90 mcg/actuation inhaler       Inhale 2 puffs into the lungs every 6 (six) hours as needed for Wheezing. Rescue    Inhale 2 puffs into the lungs every 6 (six) hours as needed for Wheezing. Rescue         CARE TEAM:  Patient Care Team:  Miguelina Weathers MD as PCP - General (Internal Medicine)  Navdeep Guardado MD (Cardiology)  Luis Palacio MD as Consulting Physician (Cardiology)  Jessica Herndon MD (Inactive) (Nephrology)  Colt Sosa MD as Consulting Physician (Plastic Surgery)  AURELIANO Minor MD as Consulting Physician (Urology)  Walter Cortés MD as Consulting Physician (Orthopedic Surgery)  Miguelina Weathers MD as Hypertension Digital Medicine Responsible Provider (Internal Medicine)  Suzie CrossD as Hypertension Digital Medicine Clinician (Pharmacist)  Miguel Smith MD as Consulting Physician (INTERVENTIONAL CARDIOLOGY)  Naomy Anaya LPN as Licensed Practical Nurse  Jose Francisco Sawyer MD as Consulting Physician (Rheumatology)  Sandra Gary DPM as Consulting Physician (Podiatry)  MOE Smalls as Audiologist (Audiology)  Fredo Manrique MD as Resident (Pulmonary Disease)  Fabio Knutson III, MD as Consulting Physician (Otolaryngology)  Hermelinda Mcclure MD as Consulting Physician (Dermatology)  Astrid Sapp Managed Medicare as Hypertension Digital Medicine Contract  Dayanara Reed as Digital Medicine Health          REVIEW OF SYSTEMS:  Review of Systems   Constitutional: Positive for fatigue. Negative for chills, fever and unexpected weight change.   HENT: Negative for congestion, ear pain, sore throat and voice change.    Eyes: Negative for photophobia, pain, discharge and visual disturbance.   Respiratory: Positive for shortness of breath. Negative for cough and wheezing.    Cardiovascular: Negative for chest  "pain, palpitations and leg swelling.   Gastrointestinal: Positive for abdominal pain (- with bloating feeling). Negative for blood in stool, constipation, diarrhea, nausea and vomiting.   Genitourinary: Negative for dysuria and frequency.   Musculoskeletal: Negative for gait problem, joint swelling and neck stiffness.   Skin: Negative for color change and rash.        - see HPI   Neurological: Positive for headaches. Negative for seizures and weakness.   Hematological: Negative for adenopathy. Does not bruise/bleed easily.   Psychiatric/Behavioral: Positive for dysphoric mood and sleep disturbance. Negative for behavioral problems. The patient is not nervous/anxious.          PHYSICAL EXAM:  Vitals:    02/17/20 1514   BP: (!) 100/52   Pulse: 79   Temp: 98 °F (36.7 °C)     Weight: (!) 153.4 kg (338 lb 3 oz)   Height: 6' 1" (185.4 cm)   Body mass index is 44.62 kg/m².      General appearance - alert, well appearing, and in no distress, morbidly obese  Psychiatric - alert, oriented to person, place, and time, normal behavior, speech, dress, motor activity, mildly depressed mood (baseline)  Eyes - pupils equal and reactive, extraocular eye movements intact, sclera anicteric  Mouth - mucous membranes moist, pharynx normal without lesions and Mallampati score: 4  Neck - supple, no significant adenopathy, carotids upstroke normal bilaterally, no bruits  Lymphatics - no palpable cervical lymphadenopathy  Chest - clear to auscultation, no wheezes, rales or rhonchi, symmetric air entry  Heart - normal rate and regular rhythm, no gallops noted  Back exam - not examined  Neurological - alert, normal speech, no focal findings or movement disorder noted, cranial nerves II through XII intact  Musculoskeletal - patient noted to have Moderate-Severe osteoarthritic changes to both knee joints. No joint effusions noted., no muscular tenderness noted  Extremities - not examined, left ankle with ace wrap  Skin - normal coloration, no " suspicious skin lesions      Labs:  No labs needed at this time      ASSESSMENT AND PLAN:  1. Essential hypertension  Discussed sodium restriction, maintaining ideal body weight and regular exercise program as physiologic means to achieve blood pressure control. The patient will strive towards this. The current medical regimen is effective;  continue present plan and medications. Recommended patient to check home readings to monitor and see me for followup as scheduled or sooner as needed. Patient was educated that both decongestant and anti-inflammatory medication may raise blood pressure.    2. Coronary artery disease involving native coronary artery of native heart without angina pectoris s/p RCA stent  Status post recent successful stenting.  We discussed the need for compliance with Plavix for 1 year and aspirin for 1 week.  He will follow up with Cardiology as per their recommendations.    3. Paroxysmal atrial fibrillation   He is currently in normal sinus rhythm. He will continue on Eliquis twice a day.    4. Bilateral carotid artery stenosis  Asymptomatic.  Observe.    5. Other hyperlipidemia  He is currently on Lipitor 40 mg daily.  He will discuss with Cardiology if this should be increased to 80 mg daily for a goal LDL of 70 or less.    6. Major depressive disorder, recurrent episode, mild  The current medical regimen is effective;  continue present plan and medications.    7. Morbid obesity with body mass index of 45.0-49.9 in adult  The patient is asked to make an attempt to improve diet and exercise patterns to aid in medical management of this problem.    8. Colon cancer screening  He has to be on Plavix for the next year.  He is also on Eliquis.  Since he is unable to hold the Plavix at this time we will have to hold off on his cancer screening for 1 year unless he develops any bleeding problems.    9. Need for shingles vaccine  Patient was advised to get immunization at the pharmacy.    10. Need for  Tdap vaccination  Patient was advised to get immunization at the pharmacy.    11. Shortness of breath  Stable. Medication as needed.  I advised the patient that if the medication does not help her shortness of breath he should not use it.  If it does help, but he needs to use it frequently, he should let me know so I can start him on a preventative inhaler.  - albuterol (PROVENTIL/VENTOLIN HFA) 90 mcg/actuation inhaler; Inhale 2 puffs into the lungs every 6 (six) hours as needed for Wheezing. Rescue  Dispense: 18 g; Refill: 0    12. Nonintractable headache, unspecified chronicity pattern, unspecified headache type  He may be having headaches as he has decreased his caffeine intake.  He can take Tylenol as needed.  He will let me know if symptoms worsen or persist.    13. Bloating  I will refer him to GI for further evaluation.  We discussed the need for dietary changes.  I think just stopping the artificial sweeteners and soft drinks will be enough to make his symptoms better.  - Ambulatory referral/consult to Gastroenterology; Future    14. Prostate cancer  Treatment per urology.         Orders Placed This Encounter   Procedures    Ambulatory referral/consult to Gastroenterology      Follow up in about 6 months (around 8/17/2020), or if symptoms worsen or fail to improve, for annual exam. or sooner as needed.

## 2020-02-18 ENCOUNTER — PATIENT OUTREACH (OUTPATIENT)
Dept: ADMINISTRATIVE | Facility: OTHER | Age: 75
End: 2020-02-18

## 2020-02-18 ENCOUNTER — PATIENT MESSAGE (OUTPATIENT)
Dept: CARDIOLOGY | Facility: CLINIC | Age: 75
End: 2020-02-18

## 2020-02-18 NOTE — PROGRESS NOTES
LINKS immunization registry, Care Everywhere and Health Maintenance updated.  Chart reviewed for overdue Proactive [facility] health maintenance testing.Patient has open case request for colonoscopy.  [employer] order not entered

## 2020-02-20 ENCOUNTER — PATIENT OUTREACH (OUTPATIENT)
Dept: OTHER | Facility: OTHER | Age: 75
End: 2020-02-20

## 2020-02-20 ENCOUNTER — OFFICE VISIT (OUTPATIENT)
Dept: OTOLARYNGOLOGY | Facility: CLINIC | Age: 75
End: 2020-02-20
Payer: MEDICARE

## 2020-02-20 VITALS — DIASTOLIC BLOOD PRESSURE: 48 MMHG | HEART RATE: 72 BPM | SYSTOLIC BLOOD PRESSURE: 98 MMHG

## 2020-02-20 DIAGNOSIS — Z78.9 HISTORY OF EXCESSIVE CERUMEN: ICD-10-CM

## 2020-02-20 DIAGNOSIS — H61.21 IMPACTED CERUMEN OF RIGHT EAR: ICD-10-CM

## 2020-02-20 DIAGNOSIS — H92.01 EAR DISCOMFORT, RIGHT: Primary | ICD-10-CM

## 2020-02-20 PROCEDURE — 69210 REMOVE IMPACTED EAR WAX UNI: CPT | Mod: HCNC,S$GLB,, | Performed by: OTOLARYNGOLOGY

## 2020-02-20 PROCEDURE — 99999 PR PBB SHADOW E&M-EST. PATIENT-LVL IV: ICD-10-PCS | Mod: PBBFAC,HCNC,, | Performed by: OTOLARYNGOLOGY

## 2020-02-20 PROCEDURE — 99999 PR PBB SHADOW E&M-EST. PATIENT-LVL IV: CPT | Mod: PBBFAC,HCNC,, | Performed by: OTOLARYNGOLOGY

## 2020-02-20 PROCEDURE — 99499 NO LOS: ICD-10-PCS | Mod: HCNC,S$GLB,, | Performed by: OTOLARYNGOLOGY

## 2020-02-20 PROCEDURE — 69210 PR REMOVAL IMPACTED CERUMEN REQUIRING INSTRUMENTATION, UNILATERAL: ICD-10-PCS | Mod: HCNC,S$GLB,, | Performed by: OTOLARYNGOLOGY

## 2020-02-20 PROCEDURE — 99499 UNLISTED E&M SERVICE: CPT | Mod: HCNC,S$GLB,, | Performed by: OTOLARYNGOLOGY

## 2020-02-20 NOTE — PATIENT INSTRUCTIONS
Rind of skin and cerumen removed from AD eac ( with water irrigation/ peroxide + suction)  Hair removed from deep AS eac   RTC 3 months for right ear cleaning   May use drop or two of white vinegar instillation to right eac prn

## 2020-02-21 NOTE — PROGRESS NOTES
CC:  HPI:Mr. Montgomery is a 75-year-old anti-coagulated bearded  gentleman who indicates the need for right ear cleaning procedure due to discomfort and hearing loss perceived there.  His right ear is his problematic ear usually.  His right ear was last cleaned by me in August of 2019.  A veneer of dried secretion and cerumen was removed from the deep right ear canal and surface of the right tympanic membrane at that visit.    I had suggested his use of white vinegar insulation the right ear canal on a p.r.n. basis.  He indicates surviving and LAD occlusion requiring coronary stenting.    Answers for HPI/ROS submitted by the patient on 2/17/2020   shortness of breath: Yes  abdominal pain: Yes  Difficulty urinating?: Yes  back pain: Yes  headaches: Yes  Bruises or bleeds easily: Yes  decreased concentration: Yes  sleep disturbance: Yes      Past Medical History:   Diagnosis Date    NAT (acute kidney injury) 3/19/2017    ALLERGIC RHINITIS     Anemia     Anxiety     Basal cell carcinoma 10/19/2018    forehead and right medial shoulder    Basal cell carcinoma 01/09/2019    left nasal bridge and left posterior ear    Chronic rhinitis 5/3/2013    Chronic rhinitis 5/3/2013    Coronary artery disease involving native coronary artery of native heart without angina pectoris s/p RCA stent     Cortical cataract of both eyes 3/18/2016    Delayed sleep phase syndrome 3/13/2019    Depression     Erectile dysfunction 3/24/2014    Erectile dysfunction 3/24/2014    Essential hypertension     GERD (gastroesophageal reflux disease) 7/25/2012    Gout, arthritis     Grade III open fracture of left tibia and fibula s/p ex-fix on 7/1/16 and ORIF of left tibia on 7/15 7/6/2016    H/O: iritis     Helicobacter pylori (H. pylori) infection     Treated    Herpes simplex keratoconjunctivitis 9/30/2015    - on acyclovir - followed by opthalmology, Dr. Uribe     Herpes simplex keratoconjunctivitis 9/30/2015    - on  "acyclovir - followed by opthalmology, Dr. Uribe     Hyperkalemia 2/28/2017    Hyperlipidemia     Hypogonadism male     Hypogonadism male     Mixed anxiety and depressive disorder     Morbid obesity     Obstructive sleep apnea on CPAP     CPAP    Osteoarthritis of left knee 7/25/2012    Paroxysmal atrial fibrillation 7/6/2016    Primary osteoarthritis of left knee 7/25/2012    Prominent aorta 1/25/2016    "RESULTS: THE HEART IS MILDLY ENLARGED WITH A SLIGHTLY PROMINENT AORTA" - Xray Chest PA & Lateral 12-     Prostate cancer 2/15/2016    - followed by urology, Dr. Young     Prostate cancer 2/15/2016    - followed by urology, Dr. 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 Conteh    CATARACT EXTRACTION W/  INTRAOCULAR LENS IMPLANT Left 4/12/2016        EXTERNAL FIXATION TIBIAL FRACTURE Left 07/01/2016    INSERTION OF IMPLANTABLE LOOP RECORDER  04/07/2017    LEFT HEART CATHETERIZATION Left 1/30/2020    Procedure: Left heart cath;  Surgeon: Benjamin Sandhu MD;  Location: [facility] CATH LAB;  Service: Cardiology;  Laterality: Left;  RN PREOP 1/28/20--Pt starting Plavix loading dose today (8pills)- Dr. Sandhu aware.  Pt has a bandaged "non healing area to LLE"--Dr. Sandhu aware.    ORIF TIBIA FRACTURE Left 07/15/2016    RADIOACTIVE SEED IMPLANTATION OF PROSTATE N/A " 8/8/2018    Procedure: INSERTION, RADIOACTIVE SEED, PROSTATE;  Surgeon: Bipin Thompson MD;  Location: [facility] OR [address] [place] [address];  Service: Urology;  Laterality: N/A;  1 hour    Squamous cell cancer removal x3 with Mohs surgery      TONSILLECTOMY      TOTAL KNEE ARTHROPLASTY  10/2012    trus/bx         Current Outpatient Medications on File Prior to Visit   Medication Sig Dispense Refill    acyclovir (ZOVIRAX) 800 MG Tab Take 1 tablet (800 mg total) by mouth 2 (two) times daily. 180 tablet 6    albuterol (PROVENTIL/VENTOLIN HFA) 90 mcg/actuation inhaler Inhale 2 puffs into the lungs every 6 (six) hours as needed for Wheezing. Rescue 18 g 0    apixaban (ELIQUIS) 5 mg Tab Take 1 tablet (5 mg total) by mouth 2 (two) times daily. 180 tablet 4    artificial tears (ISOPTO TEARS) 0.5 % ophthalmic solution Place 1 drop into both eyes as needed. (Patient taking differently: Place 1 drop into both eyes as needed (for dry eyes). )      atorvastatin (LIPITOR) 40 MG tablet Take 1 tablet (40 mg total) by mouth once daily. Please make a follow up visit with your doctor for more refills of this medication 90 tablet 0    azelastine (ASTELIN) 137 mcg (0.1 %) nasal spray 1 spray (137 mcg total) by Nasal route 2 (two) times daily. 30 mL 2    clopidogrel (PLAVIX) 75 mg tablet Take 1 tablet (75 mg total) by mouth once daily. 8 pills the first day 38 tablet 11    CYANOCOBALAMIN, VITAMIN B-12, (VITAMIN B-12 ORAL) Take 2,500 mcg by mouth once daily.      fluticasone propionate (FLONASE) 50 mcg/actuation nasal spray 1 spray (50 mcg total) by Each Nostril route once daily. 1 Bottle 0    furosemide (LASIX) 20 MG tablet Take 1 tablet (20 mg total) by mouth once daily. 90 tablet 0    melatonin 5 mg Tab Take 10 mg by mouth nightly.      multivitamin (MULTIPLE VITAMINS) per tablet Take 1 tablet by mouth once daily.      nitroGLYCERIN (NITROSTAT) 0.4 MG SL tablet Place 1 tablet (0.4 mg total) under the tongue every 5 (five) minutes as needed  for Chest pain. 25 tablet 11    olmesartan (BENICAR) 20 MG tablet Take 1 tablet (20 mg total) by mouth once daily. (replaces ramipril for blood pressure) 90 tablet 1    oxyCODONE-acetaminophen (PERCOCET) 5-325 mg per tablet Take 1 tablet by mouth every 4 to 6 hours as needed for Pain. 20 tablet 0    tamsulosin (FLOMAX) 0.4 mg Cap Take 1 capsule (0.4 mg total) by mouth once daily. 90 capsule 3    venlafaxine (EFFEXOR) 75 MG tablet Take 2 tablets (150 mg total) by mouth 2 (two) times daily. 360 tablet 0    vitamin D 1000 units Tab Take 1 tablet (1,000 Units total) by mouth once daily.      gabapentin (NEURONTIN) 300 MG capsule Take 1 capsule (300 mg total) by mouth 2 (two) times daily. 60 capsule 11     No current facility-administered medications on file prior to visit.          PE:  Height 6 ft 1 in weight 338 lb blood pressure 98/48 pulse 72  General:  Alert and oriented gentleman in no acute distress  Both ears were examined under the microscope in the micro procedure room  Procedure:  A cuff of cerumen and keratin agents debris is carefully extracted from the deep right ear canal as well as from the surface of the right tympanic membrane with insulation of peroxide and water irrigation and careful suctioning.  Right TM is intact and clearer when visualized.  A hairs removed from the deep left ear canal with microforceps.  The left TM is slightly sclerotic.  Left middle ear space appears well aerated.    DIAGNOSIS:     ICD-10-CM ICD-9-CM    1. Ear discomfort, right H92.01 388.70    2. History of excessive cerumen Z78.9 V49.89    3. Impacted cerumen of right ear H61.21 380.4    4.      Low blood pressure reading today    PLAN: Rind of skin and cerumen removed from AD eac ( with water irrigation/ peroxide + suction)  Hair removed from deep AS eac   RTC 3 months for right ear cleaning   May use drop or two of white vinegar instillation to right eac prn

## 2020-02-24 ENCOUNTER — PATIENT OUTREACH (OUTPATIENT)
Dept: ADMINISTRATIVE | Facility: OTHER | Age: 75
End: 2020-02-24

## 2020-02-24 ENCOUNTER — TELEPHONE (OUTPATIENT)
Dept: FAMILY MEDICINE | Facility: CLINIC | Age: 75
End: 2020-02-24

## 2020-02-24 ENCOUNTER — OFFICE VISIT (OUTPATIENT)
Dept: PODIATRY | Facility: CLINIC | Age: 75
End: 2020-02-24
Payer: MEDICARE

## 2020-02-24 VITALS — WEIGHT: 315 LBS | HEIGHT: 73 IN | BODY MASS INDEX: 41.75 KG/M2

## 2020-02-24 DIAGNOSIS — L97.929 VENOUS STASIS ULCER OF LEFT LOWER LEG WITH EDEMA OF LEFT LOWER LEG: ICD-10-CM

## 2020-02-24 DIAGNOSIS — I83.029 VENOUS STASIS ULCER OF LEFT LOWER LEG WITH EDEMA OF LEFT LOWER LEG: ICD-10-CM

## 2020-02-24 DIAGNOSIS — I73.9 PVD (PERIPHERAL VASCULAR DISEASE): Primary | ICD-10-CM

## 2020-02-24 DIAGNOSIS — R60.0 VENOUS STASIS ULCER OF LEFT LOWER LEG WITH EDEMA OF LEFT LOWER LEG: ICD-10-CM

## 2020-02-24 DIAGNOSIS — I83.892 VENOUS STASIS ULCER OF LEFT LOWER LEG WITH EDEMA OF LEFT LOWER LEG: ICD-10-CM

## 2020-02-24 PROCEDURE — 1101F PT FALLS ASSESS-DOCD LE1/YR: CPT | Mod: HCNC,CPTII,S$GLB, | Performed by: PODIATRIST

## 2020-02-24 PROCEDURE — 99213 PR OFFICE/OUTPT VISIT, EST, LEVL III, 20-29 MIN: ICD-10-PCS | Mod: HCNC,S$GLB,, | Performed by: PODIATRIST

## 2020-02-24 PROCEDURE — 1125F PR PAIN SEVERITY QUANTIFIED, PAIN PRESENT: ICD-10-PCS | Mod: HCNC,S$GLB,, | Performed by: PODIATRIST

## 2020-02-24 PROCEDURE — 99499 UNLISTED E&M SERVICE: CPT | Mod: HCNC,S$GLB,, | Performed by: PODIATRIST

## 2020-02-24 PROCEDURE — 1159F PR MEDICATION LIST DOCUMENTED IN MEDICAL RECORD: ICD-10-PCS | Mod: HCNC,S$GLB,, | Performed by: PODIATRIST

## 2020-02-24 PROCEDURE — 1125F AMNT PAIN NOTED PAIN PRSNT: CPT | Mod: HCNC,S$GLB,, | Performed by: PODIATRIST

## 2020-02-24 PROCEDURE — 99499 RISK ADDL DX/OHS AUDIT: ICD-10-PCS | Mod: HCNC,S$GLB,, | Performed by: PODIATRIST

## 2020-02-24 PROCEDURE — 99999 PR PBB SHADOW E&M-EST. PATIENT-LVL III: ICD-10-PCS | Mod: PBBFAC,HCNC,, | Performed by: PODIATRIST

## 2020-02-24 PROCEDURE — 99213 OFFICE O/P EST LOW 20 MIN: CPT | Mod: HCNC,S$GLB,, | Performed by: PODIATRIST

## 2020-02-24 PROCEDURE — 99999 PR PBB SHADOW E&M-EST. PATIENT-LVL III: CPT | Mod: PBBFAC,HCNC,, | Performed by: PODIATRIST

## 2020-02-24 PROCEDURE — 1101F PR PT FALLS ASSESS DOC 0-1 FALLS W/OUT INJ PAST YR: ICD-10-PCS | Mod: HCNC,CPTII,S$GLB, | Performed by: PODIATRIST

## 2020-02-24 PROCEDURE — 1159F MED LIST DOCD IN RCRD: CPT | Mod: HCNC,S$GLB,, | Performed by: PODIATRIST

## 2020-02-24 NOTE — TELEPHONE ENCOUNTER
Left messages and mailing letter for patient to schedule his Gastroenterology appointment. Thanks.

## 2020-02-26 NOTE — PROGRESS NOTES
Called to address low BP alert  Left voicemail requesting call back  BP average has decreased, 110/58 mmHg  Patient s/p cardiac stenting    Hypertension Medications             furosemide (LASIX) 20 MG tablet Take 1 tablet (20 mg total) by mouth once daily.    nitroGLYCERIN (NITROSTAT) 0.4 MG SL tablet Place 1 tablet (0.4 mg total) under the tongue every 5 (five) minutes as needed for Chest pain.    olmesartan (BENICAR) 20 MG tablet Take 1 tablet (20 mg total) by mouth once daily. (replaces ramipril for blood pressure)

## 2020-03-01 ENCOUNTER — PATIENT OUTREACH (OUTPATIENT)
Dept: ADMINISTRATIVE | Facility: OTHER | Age: 75
End: 2020-03-01

## 2020-03-02 ENCOUNTER — OFFICE VISIT (OUTPATIENT)
Dept: PODIATRY | Facility: CLINIC | Age: 75
End: 2020-03-02
Payer: MEDICARE

## 2020-03-02 VITALS
DIASTOLIC BLOOD PRESSURE: 62 MMHG | WEIGHT: 315 LBS | BODY MASS INDEX: 41.75 KG/M2 | HEIGHT: 73 IN | SYSTOLIC BLOOD PRESSURE: 104 MMHG

## 2020-03-02 DIAGNOSIS — I87.8 VENOUS STASIS OF LOWER EXTREMITY: ICD-10-CM

## 2020-03-02 DIAGNOSIS — R60.0 VENOUS STASIS ULCER OF LEFT LOWER LEG WITH EDEMA OF LEFT LOWER LEG: ICD-10-CM

## 2020-03-02 DIAGNOSIS — L97.929 VENOUS STASIS ULCER OF LEFT LOWER LEG WITH EDEMA OF LEFT LOWER LEG: ICD-10-CM

## 2020-03-02 DIAGNOSIS — I83.892 VENOUS STASIS ULCER OF LEFT LOWER LEG WITH EDEMA OF LEFT LOWER LEG: ICD-10-CM

## 2020-03-02 DIAGNOSIS — I83.029 VENOUS STASIS ULCER OF LEFT LOWER LEG WITH EDEMA OF LEFT LOWER LEG: ICD-10-CM

## 2020-03-02 DIAGNOSIS — I73.9 PVD (PERIPHERAL VASCULAR DISEASE): Primary | ICD-10-CM

## 2020-03-02 PROCEDURE — 1126F AMNT PAIN NOTED NONE PRSNT: CPT | Mod: HCNC,S$GLB,, | Performed by: PODIATRIST

## 2020-03-02 PROCEDURE — 3074F SYST BP LT 130 MM HG: CPT | Mod: HCNC,CPTII,S$GLB, | Performed by: PODIATRIST

## 2020-03-02 PROCEDURE — 3074F PR MOST RECENT SYSTOLIC BLOOD PRESSURE < 130 MM HG: ICD-10-PCS | Mod: HCNC,CPTII,S$GLB, | Performed by: PODIATRIST

## 2020-03-02 PROCEDURE — 1126F PR PAIN SEVERITY QUANTIFIED, NO PAIN PRESENT: ICD-10-PCS | Mod: HCNC,S$GLB,, | Performed by: PODIATRIST

## 2020-03-02 PROCEDURE — 1101F PT FALLS ASSESS-DOCD LE1/YR: CPT | Mod: HCNC,CPTII,S$GLB, | Performed by: PODIATRIST

## 2020-03-02 PROCEDURE — 1101F PR PT FALLS ASSESS DOC 0-1 FALLS W/OUT INJ PAST YR: ICD-10-PCS | Mod: HCNC,CPTII,S$GLB, | Performed by: PODIATRIST

## 2020-03-02 PROCEDURE — 1159F PR MEDICATION LIST DOCUMENTED IN MEDICAL RECORD: ICD-10-PCS | Mod: HCNC,S$GLB,, | Performed by: PODIATRIST

## 2020-03-02 PROCEDURE — 3078F PR MOST RECENT DIASTOLIC BLOOD PRESSURE < 80 MM HG: ICD-10-PCS | Mod: HCNC,CPTII,S$GLB, | Performed by: PODIATRIST

## 2020-03-02 PROCEDURE — 99213 OFFICE O/P EST LOW 20 MIN: CPT | Mod: HCNC,S$GLB,, | Performed by: PODIATRIST

## 2020-03-02 PROCEDURE — 3078F DIAST BP <80 MM HG: CPT | Mod: HCNC,CPTII,S$GLB, | Performed by: PODIATRIST

## 2020-03-02 PROCEDURE — 99999 PR PBB SHADOW E&M-EST. PATIENT-LVL III: CPT | Mod: PBBFAC,HCNC,, | Performed by: PODIATRIST

## 2020-03-02 PROCEDURE — 1159F MED LIST DOCD IN RCRD: CPT | Mod: HCNC,S$GLB,, | Performed by: PODIATRIST

## 2020-03-02 PROCEDURE — 99999 PR PBB SHADOW E&M-EST. PATIENT-LVL III: ICD-10-PCS | Mod: PBBFAC,HCNC,, | Performed by: PODIATRIST

## 2020-03-02 PROCEDURE — 99213 PR OFFICE/OUTPT VISIT, EST, LEVL III, 20-29 MIN: ICD-10-PCS | Mod: HCNC,S$GLB,, | Performed by: PODIATRIST

## 2020-03-03 ENCOUNTER — OFFICE VISIT (OUTPATIENT)
Dept: DERMATOLOGY | Facility: CLINIC | Age: 75
End: 2020-03-03
Payer: MEDICARE

## 2020-03-03 ENCOUNTER — OFFICE VISIT (OUTPATIENT)
Dept: CARDIOLOGY | Facility: CLINIC | Age: 75
End: 2020-03-03
Payer: MEDICARE

## 2020-03-03 VITALS
DIASTOLIC BLOOD PRESSURE: 50 MMHG | RESPIRATION RATE: 16 BRPM | SYSTOLIC BLOOD PRESSURE: 106 MMHG | OXYGEN SATURATION: 95 % | BODY MASS INDEX: 44.68 KG/M2 | HEART RATE: 73 BPM | WEIGHT: 315 LBS

## 2020-03-03 DIAGNOSIS — L73.8 SEBACEOUS GLAND HYPERPLASIA: ICD-10-CM

## 2020-03-03 DIAGNOSIS — I25.10 CORONARY ARTERY DISEASE INVOLVING NATIVE CORONARY ARTERY OF NATIVE HEART WITHOUT ANGINA PECTORIS: Primary | ICD-10-CM

## 2020-03-03 DIAGNOSIS — I48.0 PAROXYSMAL ATRIAL FIBRILLATION: ICD-10-CM

## 2020-03-03 DIAGNOSIS — L57.0 AK (ACTINIC KERATOSIS): Primary | ICD-10-CM

## 2020-03-03 DIAGNOSIS — I10 ESSENTIAL HYPERTENSION: ICD-10-CM

## 2020-03-03 DIAGNOSIS — I73.9 PVD (PERIPHERAL VASCULAR DISEASE): ICD-10-CM

## 2020-03-03 DIAGNOSIS — E78.49 OTHER HYPERLIPIDEMIA: ICD-10-CM

## 2020-03-03 DIAGNOSIS — L82.1 SK (SEBORRHEIC KERATOSIS): ICD-10-CM

## 2020-03-03 DIAGNOSIS — L82.0 BENIGN LICHENOID KERATOSIS: ICD-10-CM

## 2020-03-03 DIAGNOSIS — I25.10 CORONARY ARTERY DISEASE INVOLVING NATIVE CORONARY ARTERY OF NATIVE HEART WITHOUT ANGINA PECTORIS: ICD-10-CM

## 2020-03-03 DIAGNOSIS — Z85.828 PERSONAL HISTORY OF OTHER MALIGNANT NEOPLASM OF SKIN: ICD-10-CM

## 2020-03-03 DIAGNOSIS — I65.23 BILATERAL CAROTID ARTERY STENOSIS: ICD-10-CM

## 2020-03-03 PROCEDURE — 17003 DESTRUCTION, PREMALIGNANT LESIONS; SECOND THROUGH 14 LESIONS: ICD-10-PCS | Mod: HCNC,S$GLB,, | Performed by: DERMATOLOGY

## 2020-03-03 PROCEDURE — 1101F PT FALLS ASSESS-DOCD LE1/YR: CPT | Mod: HCNC,CPTII,S$GLB, | Performed by: DERMATOLOGY

## 2020-03-03 PROCEDURE — 1101F PR PT FALLS ASSESS DOC 0-1 FALLS W/OUT INJ PAST YR: ICD-10-PCS | Mod: HCNC,CPTII,S$GLB, | Performed by: INTERNAL MEDICINE

## 2020-03-03 PROCEDURE — 3074F PR MOST RECENT SYSTOLIC BLOOD PRESSURE < 130 MM HG: ICD-10-PCS | Mod: HCNC,CPTII,S$GLB, | Performed by: DERMATOLOGY

## 2020-03-03 PROCEDURE — 99999 PR PBB SHADOW E&M-EST. PATIENT-LVL III: ICD-10-PCS | Mod: PBBFAC,HCNC,, | Performed by: INTERNAL MEDICINE

## 2020-03-03 PROCEDURE — 99214 PR OFFICE/OUTPT VISIT, EST, LEVL IV, 30-39 MIN: ICD-10-PCS | Mod: 25,HCNC,S$GLB, | Performed by: DERMATOLOGY

## 2020-03-03 PROCEDURE — 99214 OFFICE O/P EST MOD 30 MIN: CPT | Mod: 25,HCNC,S$GLB, | Performed by: DERMATOLOGY

## 2020-03-03 PROCEDURE — 3078F DIAST BP <80 MM HG: CPT | Mod: HCNC,CPTII,S$GLB, | Performed by: INTERNAL MEDICINE

## 2020-03-03 PROCEDURE — 1159F MED LIST DOCD IN RCRD: CPT | Mod: HCNC,S$GLB,, | Performed by: INTERNAL MEDICINE

## 2020-03-03 PROCEDURE — 99999 PR PBB SHADOW E&M-EST. PATIENT-LVL II: CPT | Mod: PBBFAC,HCNC,, | Performed by: DERMATOLOGY

## 2020-03-03 PROCEDURE — 17000 PR DESTRUCTION(LASER SURGERY,CRYOSURGERY,CHEMOSURGERY),PREMALIGNANT LESIONS,FIRST LESION: ICD-10-PCS | Mod: HCNC,S$GLB,, | Performed by: DERMATOLOGY

## 2020-03-03 PROCEDURE — 3074F PR MOST RECENT SYSTOLIC BLOOD PRESSURE < 130 MM HG: ICD-10-PCS | Mod: HCNC,CPTII,S$GLB, | Performed by: INTERNAL MEDICINE

## 2020-03-03 PROCEDURE — 3074F SYST BP LT 130 MM HG: CPT | Mod: HCNC,CPTII,S$GLB, | Performed by: DERMATOLOGY

## 2020-03-03 PROCEDURE — 99214 OFFICE O/P EST MOD 30 MIN: CPT | Mod: HCNC,S$GLB,, | Performed by: INTERNAL MEDICINE

## 2020-03-03 PROCEDURE — 1126F PR PAIN SEVERITY QUANTIFIED, NO PAIN PRESENT: ICD-10-PCS | Mod: HCNC,S$GLB,, | Performed by: INTERNAL MEDICINE

## 2020-03-03 PROCEDURE — 3074F SYST BP LT 130 MM HG: CPT | Mod: HCNC,CPTII,S$GLB, | Performed by: INTERNAL MEDICINE

## 2020-03-03 PROCEDURE — 1126F AMNT PAIN NOTED NONE PRSNT: CPT | Mod: HCNC,S$GLB,, | Performed by: DERMATOLOGY

## 2020-03-03 PROCEDURE — 99999 PR PBB SHADOW E&M-EST. PATIENT-LVL III: CPT | Mod: PBBFAC,HCNC,, | Performed by: INTERNAL MEDICINE

## 2020-03-03 PROCEDURE — 17003 DESTRUCT PREMALG LES 2-14: CPT | Mod: HCNC,S$GLB,, | Performed by: DERMATOLOGY

## 2020-03-03 PROCEDURE — 99999 PR PBB SHADOW E&M-EST. PATIENT-LVL II: ICD-10-PCS | Mod: PBBFAC,HCNC,, | Performed by: DERMATOLOGY

## 2020-03-03 PROCEDURE — 1101F PR PT FALLS ASSESS DOC 0-1 FALLS W/OUT INJ PAST YR: ICD-10-PCS | Mod: HCNC,CPTII,S$GLB, | Performed by: DERMATOLOGY

## 2020-03-03 PROCEDURE — 17000 DESTRUCT PREMALG LESION: CPT | Mod: HCNC,S$GLB,, | Performed by: DERMATOLOGY

## 2020-03-03 PROCEDURE — 1159F PR MEDICATION LIST DOCUMENTED IN MEDICAL RECORD: ICD-10-PCS | Mod: HCNC,S$GLB,, | Performed by: DERMATOLOGY

## 2020-03-03 PROCEDURE — 1126F AMNT PAIN NOTED NONE PRSNT: CPT | Mod: HCNC,S$GLB,, | Performed by: INTERNAL MEDICINE

## 2020-03-03 PROCEDURE — 3078F PR MOST RECENT DIASTOLIC BLOOD PRESSURE < 80 MM HG: ICD-10-PCS | Mod: HCNC,CPTII,S$GLB, | Performed by: INTERNAL MEDICINE

## 2020-03-03 PROCEDURE — 99214 PR OFFICE/OUTPT VISIT, EST, LEVL IV, 30-39 MIN: ICD-10-PCS | Mod: HCNC,S$GLB,, | Performed by: INTERNAL MEDICINE

## 2020-03-03 PROCEDURE — 1101F PT FALLS ASSESS-DOCD LE1/YR: CPT | Mod: HCNC,CPTII,S$GLB, | Performed by: INTERNAL MEDICINE

## 2020-03-03 PROCEDURE — 1126F PR PAIN SEVERITY QUANTIFIED, NO PAIN PRESENT: ICD-10-PCS | Mod: HCNC,S$GLB,, | Performed by: DERMATOLOGY

## 2020-03-03 PROCEDURE — 1159F MED LIST DOCD IN RCRD: CPT | Mod: HCNC,S$GLB,, | Performed by: DERMATOLOGY

## 2020-03-03 PROCEDURE — 3078F DIAST BP <80 MM HG: CPT | Mod: HCNC,CPTII,S$GLB, | Performed by: DERMATOLOGY

## 2020-03-03 PROCEDURE — 3078F PR MOST RECENT DIASTOLIC BLOOD PRESSURE < 80 MM HG: ICD-10-PCS | Mod: HCNC,CPTII,S$GLB, | Performed by: DERMATOLOGY

## 2020-03-03 PROCEDURE — 1159F PR MEDICATION LIST DOCUMENTED IN MEDICAL RECORD: ICD-10-PCS | Mod: HCNC,S$GLB,, | Performed by: INTERNAL MEDICINE

## 2020-03-03 NOTE — PATIENT INSTRUCTIONS

## 2020-03-03 NOTE — PROGRESS NOTES
CARDIOVASCULAR CONSULTATION    REASON FOR CONSULT:   Janusz Montgomery Jr. is a 75 y.o. male who presents for EVALUATION of PCI.      HISTORY OF PRESENT ILLNESS:     Notes from March 2020:  Patient here for follow-up after unprotected left main stenting via the right radial artery approach recently.  Doing fine.  States chest tightness has gone away.  Breathing feels better, although still has shortness of breath, exercise tolerance has improved since the PCI.  Referral for cardiac rehabilitation has been made.  Denies any chest pains at rest on exertion, orthopnea, PND.      PCI February 2020:  1.  Successful PCI with IVUS guidance from left main into ostial LAD with drug-eluting stent x1.  Four into 12 mm drug-eluting stent was implanted, post dilated with a 4.5 mm noncompliant balloon at high atmospheric pressures.  Excellent angiographic results with ENMANUEL 3 flow pre and post PCI.  IVUS post PCI demonstrated well-expanded stent and well apposed stent.     2.  RCA:  Mild 20-30% stenosis, previously placed RCA stent is patent.     Access:  Ultrasound-guided access of right radial artery was obtained.  Hemostasis with radial band.     Assessment and plan     1.  Aspirin 81 mg daily for 1 week     2.  Plavix 75 mg daily uninterrupted for at least 1 year     3.  Resume Eliquis 5 mg b.i.d. in a.m.     4.  Follow-up in Cardiology Clinic with me x1, subsequently with Dr. Sandhu.    =========================  Patient is a pleasant 75-year-old man.  Past medical history significant for morbid obesity and multiple other, but 80s including coronary artery disease.  Was having dyspnea on exertion as well as chest tightness on exertion.  Underwent coronary angiogram by Dr. Sandhu.  On the coronary angiogram was noted to have his distal left main disease as well as ostial LAD 90% stenosis.  IVUS was performed during that cardiac catheterization he is here is intact family to discuss revascularization options.  Main complaint is  significant dyspnea on exertion as well as chest tightness whenever he exerts himself.  Was started on Imdur, but started having horrible headaches with the Imdur and had to be stopped.  Denies any resting chest pains.    Cardiac catheterization January 2020:    There is a distal left main 50% stenosis, extending into ostial LAD which is 90% stenosed.  IVUS was performed on left main, lad.  Then the wire was redirected from the left main into the circumflex and IVUS was performed on the left main and proximal circumflex also.     IVUS images reviewed and revealed block extending from distal left main into the ostial LAD.  Not much block per done in ostial circumflex.  ML a and left main is 7.8 mm2.     Assessment and plan     CT surgery consultation.     If patient not a candidate for CT surgery, then PCI from left main into ostial LAD.  Provisional PCI of circumflex if required due to plaque shift into circumflex.     Aspirin 81 mg daily     Plavix 75 mg daily     Follow-up in Cardiology Clinic      · 50% distal LM extending into ostial LAD  · 80% ostial LAD stenosis  · Patent mid RCA stent with 20% in stent restenosis  · LVEDP (Pre): 20        · Normal left ventricular systolic function. The estimated ejection fraction is 60%  · No wall motion abnormalities.  · Moderate eccentric left ventricular hypertrophy.  · Mild left ventricular enlargement.  · Normal right ventricular systolic function.  · The sinuses of Valsalva is mildly dilated. 3.8cm.  · Mild aortic regurgitation.  · The estimated PA systolic pressure is 33 mm Hg        PAST MEDICAL HISTORY:     Past Medical History:   Diagnosis Date    NAT (acute kidney injury) 3/19/2017    ALLERGIC RHINITIS     Anemia     Anxiety     Basal cell carcinoma 10/19/2018    forehead and right medial shoulder    Basal cell carcinoma 01/09/2019    left nasal bridge and left posterior ear    Chronic rhinitis 5/3/2013    Chronic rhinitis 5/3/2013    Coronary artery  "disease involving native coronary artery of native heart without angina pectoris s/p RCA stent     Cortical cataract of both eyes 3/18/2016    Delayed sleep phase syndrome 3/13/2019    Depression     Erectile dysfunction 3/24/2014    Erectile dysfunction 3/24/2014    Essential hypertension     GERD (gastroesophageal reflux disease) 7/25/2012    Gout, arthritis     Grade III open fracture of left tibia and fibula s/p ex-fix on 7/1/16 and ORIF of left tibia on 7/15 7/6/2016    H/O: iritis     Helicobacter pylori (H. pylori) infection     Treated    Herpes simplex keratoconjunctivitis 9/30/2015    - on acyclovir - followed by opthalmology, Dr. Uribe     Herpes simplex keratoconjunctivitis 9/30/2015    - on acyclovir - followed by opthalmology, Dr. Uribe     Hyperkalemia 2/28/2017    Hyperlipidemia     Hypogonadism male     Hypogonadism male     Mixed anxiety and depressive disorder     Morbid obesity     Obstructive sleep apnea on CPAP     CPAP    Osteoarthritis of left knee 7/25/2012    Paroxysmal atrial fibrillation 7/6/2016    Primary osteoarthritis of left knee 7/25/2012    Prominent aorta 1/25/2016    "RESULTS: THE HEART IS MILDLY ENLARGED WITH A SLIGHTLY PROMINENT AORTA" - Xray Chest PA & Lateral 12-     Prostate cancer 2/15/2016    - followed by urology, Dr. Young     Prostate cancer 2/15/2016    - followed by urology, Dr. 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 Conteh    CATARACT EXTRACTION W/  INTRAOCULAR LENS IMPLANT Left 4/12/2016        EXTERNAL FIXATION TIBIAL FRACTURE Left 07/01/2016    INSERTION OF IMPLANTABLE LOOP RECORDER  04/07/2017    LEFT HEART CATHETERIZATION Left 1/30/2020    Procedure: Left heart cath;  Surgeon: Benjamin Sandhu MD;  Location: [facility] CATH LAB;  Service: Cardiology;  Laterality: Left;  RN PREOP 1/28/20--Pt starting Plavix loading dose today (8pills)- Dr. Sandhu aware.  Pt has a bandaged "non healing area to LLE"--Dr. Sandhu aware.    LEFT HEART CATHETERIZATION Left 2/14/2020    Procedure: Left heart cath, IVUS guided left main / LAD PCI. Noon start, radial approach;  Surgeon: Miguel Angel Brady MD;  Location: [facility] CATH LAB;  Service: Cardiology;  Laterality: Left;  RN PRE OP 2-7-2020  BMI--45.61    ORIF TIBIA FRACTURE Left 07/15/2016    RADIOACTIVE SEED IMPLANTATION OF PROSTATE N/A 8/8/2018    Procedure: INSERTION, RADIOACTIVE SEED, PROSTATE;  Surgeon: Bipin Thompson MD;  Location: [facility] OR [address] [place] [address];  Service: Urology;  Laterality: N/A;  1 hour    Squamous cell cancer removal x3 with Mohs surgery      TONSILLECTOMY      TOTAL KNEE ARTHROPLASTY  10/2012    trus/bx      ULTRASOUND GUIDANCE  2/14/2020    Procedure: Ultrasound Guidance;  Surgeon: Miguel Angel Brady MD;  Location: [facility] CATH LAB;  Service: Cardiology;;       ALLERGIES AND MEDICATION:     Review of patient's allergies indicates:   Allergen Reactions    Ciprofloxacin Rash     Diffuse pruritic morbilliform rash developed 3/15/2017 after dose of cipro; previously in 2/2017 he had rash/fevers after initiation of cipro    Zosyn [piperacillin-tazobactam] Rash     Diffuse pruritic morbilliform rash developed 3/15/2017.  Then, 430am dose on 3/16 and rash worsened with SOB/tachypnea but no hypoxemia.     Bacitracin Itching and Rash     Violaceous rash in area of topical Tx.       "   Medication List           Accurate as of March 3, 2020  9:57 AM. If you have any questions, ask your nurse or doctor.               CHANGE how you take these medications    artificial tears 0.5 % ophthalmic solution  Commonly known as:  ISOPTO TEARS  Place 1 drop into both eyes as needed.  What changed:  reasons to take this        CONTINUE taking these medications    acyclovir 800 MG Tab  Commonly known as:  ZOVIRAX  Take 1 tablet (800 mg total) by mouth 2 (two) times daily.     albuterol 90 mcg/actuation inhaler  Commonly known as:  PROVENTIL/VENTOLIN HFA  Inhale 2 puffs into the lungs every 6 (six) hours as needed for Wheezing. Rescue     apixaban 5 mg Tab  Commonly known as:  ELIQUIS  Take 1 tablet (5 mg total) by mouth 2 (two) times daily.     atorvastatin 40 MG tablet  Commonly known as:  LIPITOR  Take 1 tablet (40 mg total) by mouth once daily. Please make a follow up visit with your doctor for more refills of this medication     azelastine 137 mcg (0.1 %) nasal spray  Commonly known as:  ASTELIN  1 spray (137 mcg total) by Nasal route 2 (two) times daily.     clopidogreL 75 mg tablet  Commonly known as:  PLAVIX  Take 1 tablet (75 mg total) by mouth once daily. 8 pills the first day     fluticasone propionate 50 mcg/actuation nasal spray  Commonly known as:  FLONASE  1 spray (50 mcg total) by Each Nostril route once daily.     furosemide 20 MG tablet  Commonly known as:  LASIX  Take 1 tablet (20 mg total) by mouth once daily.     gabapentin 300 MG capsule  Commonly known as:  NEURONTIN  Take 1 capsule (300 mg total) by mouth 2 (two) times daily.     melatonin  Commonly known as:  MELATIN     Multiple Vitamins per tablet  Generic drug:  multivitamin     nitroGLYCERIN 0.4 MG SL tablet  Commonly known as:  NITROSTAT  Place 1 tablet (0.4 mg total) under the tongue every 5 (five) minutes as needed for Chest pain.     olmesartan 20 MG tablet  Commonly known as:  BENICAR  Take 1 tablet (20 mg total) by mouth once  daily. (replaces ramipril for blood pressure)     oxyCODONE-acetaminophen 5-325 mg per tablet  Commonly known as:  Percocet  Take 1 tablet by mouth every 4 to 6 hours as needed for Pain.     tamsulosin 0.4 mg Cap  Commonly known as:  FLOMAX  Take 1 capsule (0.4 mg total) by mouth once daily.     venlafaxine 75 MG tablet  Commonly known as:  EFFEXOR  Take 2 tablets (150 mg total) by mouth 2 (two) times daily.     VITAMIN B-12 ORAL     vitamin D 1000 units Tab  Commonly known as:  VITAMIN D3  Take 1 tablet (1,000 Units total) by mouth once daily.            SOCIAL HISTORY:     Social History     Socioeconomic History    Marital status:      Spouse name: Not on file    Number of children: Not on file    Years of education: Not on file    Highest education level: Not on file   Occupational History    Occupation: Retired    Social Needs    Financial resource strain: Not hard at all    Food insecurity:     Worry: Never true     Inability: Never true    Transportation needs:     Medical: No     Non-medical: No   Tobacco Use    Smoking status: Never Smoker    Smokeless tobacco: Never Used   Substance and Sexual Activity    Alcohol use: Yes     Frequency: 2-4 times a month     Drinks per session: 3 or 4     Binge frequency: Less than monthly     Comment: occasionally, beer    Drug use: Never    Sexual activity: Yes     Partners: Female   Lifestyle    Physical activity:     Days per week: Not on file     Minutes per session: Not on file    Stress: Not on file   Relationships    Social connections:     Talks on phone: Three times a week     Gets together: Once a week     Attends Taoism service: More than 4 times per year     Active member of club or organization: Yes     Attends meetings of clubs or organizations: More than 4 times per year     Relationship status:    Other Topics Concern    Not on file   Social History Narrative    Not on file       FAMILY HISTORY:     Family History    Problem Relation Age of Onset    Skin cancer Father     Lung cancer Father     Cancer Father         smoker,     Alzheimer's disease Mother     Hypertension Mother     Cancer Sister         colon, lung cancer     No Known Problems Sister     Cancer Brother         skin cancer, polypectomy     Peripheral vascular disease Unknown     No Known Problems Maternal Aunt     No Known Problems Maternal Uncle     No Known Problems Paternal Aunt     No Known Problems Paternal Uncle     No Known Problems Maternal Grandmother     No Known Problems Maternal Grandfather     No Known Problems Paternal Grandmother     No Known Problems Paternal Grandfather     Melanoma Neg Hx     Psoriasis Neg Hx     Lupus Neg Hx     Eczema Neg Hx     Amblyopia Neg Hx     Blindness Neg Hx     Cataracts Neg Hx     Diabetes Neg Hx     Glaucoma Neg Hx     Macular degeneration Neg Hx     Retinal detachment Neg Hx     Strabismus Neg Hx     Stroke Neg Hx     Thyroid disease Neg Hx     Acne Neg Hx        REVIEW OF SYSTEMS:   Review of Systems   Constitution: Negative.   HENT: Negative.    Eyes: Negative.    Cardiovascular: Positive for dyspnea on exertion.   Respiratory: Negative.    Endocrine: Negative.    Hematologic/Lymphatic: Negative.    Skin: Negative.    Musculoskeletal: Negative.    Gastrointestinal: Negative.    Genitourinary: Negative.    Neurological: Negative.    Psychiatric/Behavioral: Negative.    Allergic/Immunologic: Negative.        A 10 point review of systems was performed and all the pertinent positives have been mentioned. Rest of review of systems was negative.        PHYSICAL EXAM:     Vitals:    03/03/20 0947   BP: (!) 106/50   Pulse: 73   Resp: 16    Body mass index is 44.68 kg/m².  Weight: (!) 153.6 kg (338 lb 10 oz)         Physical Exam   Constitutional: He is oriented to person, place, and time. He appears well-developed and well-nourished.   HENT:   Head: Normocephalic.   Eyes: Pupils are  equal, round, and reactive to light. Conjunctivae are normal.   Neck: Normal range of motion. Neck supple.   Cardiovascular: Normal rate, regular rhythm and normal heart sounds.   Pulmonary/Chest: Effort normal and breath sounds normal.   Abdominal: Soft. Bowel sounds are normal.   Neurological: He is alert and oriented to person, place, and time.   Skin: Skin is warm.   Vitals reviewed.        DATA:     Laboratory:  CBC:  Recent Labs   Lab 01/28/20  1508 02/07/20  1522 02/11/20  1200   WBC 10.17 10.31 12.83 H   Hemoglobin 11.5 L 11.7 L 12.5 L   Hematocrit 34.5 L 35.9 L 40.6   Platelets 175 215 238       CHEMISTRIES:  Recent Labs   Lab 03/22/17  0400 03/23/17  0452  05/03/18  1346  01/28/20  1508 02/07/20  1522 02/11/20  1200   Glucose 102 111 H   < > 105   < > 98 107 112 H   Sodium 140 139   < > 139   < > 141 140 143   Potassium 4.3 3.9   < > 4.8   < > 4.5 4.3 5.4 H   BUN, Bld 20 18   < > 21   < > 18 25 H 28 H   Creatinine 1.7 H 1.7 H   < > 1.2   < > 1.2 1.1 1.5 H   eGFR if  46.2 A 46.2 A   < > >60   < > >60 >60 52.6 A   eGFR if non  40.0 A 40.0 A   < > >60   < > 60 >60 45.5 A   Calcium 8.4 L 9.0   < > 9.3   < > 8.6 L 9.0 9.5   Magnesium 1.8 1.9  --  2.3  --   --   --   --     < > = values in this interval not displayed.       CARDIAC BIOMARKERS:  Recent Labs   Lab 03/17/17  0837   CPK 48       COAGS:  Recent Labs   Lab 01/28/20  1508   INR 1.0       LIPIDS/LFTS:  Recent Labs   Lab 12/12/17  1318 05/03/18  1346 11/28/18  1356 02/11/20  1200   Cholesterol 146  --  150 168   Triglycerides 152 H  --  149 150   HDL 32 L  --  30 L 34 L   LDL Cholesterol 83.6  --  90.2 104.0   Non-HDL Cholesterol 114  --  120 134   AST 20 14  --  17   ALT 29 20  --  29       Hemoglobin A1C   Date Value Ref Range Status   02/11/2020 5.6 4.0 - 5.6 % Final     Comment:     ADA Screening Guidelines:  5.7-6.4%  Consistent with prediabetes  >or=6.5%  Consistent with diabetes  High levels of fetal hemoglobin  interfere with the HbA1C  assay. Heterozygous hemoglobin variants (HbS, HgC, etc)do  not significantly interfere with this assay.   However, presence of multiple variants may affect accuracy.     07/11/2016 5.3 4.5 - 6.2 % Final     Comment:     According to ADA guidelines, hemoglobin A1C <7.0% represents  optimal control in non-pregnant diabetic patients.  Different  metrics may apply to specific populations.   Standards of Medical Care in Diabetes - 2016.  For the purpose of screening for the presence of diabetes:  <5.7%     Consistent with the absence of diabetes  5.7-6.4%  Consistent with increasing risk for diabetes   (prediabetes)  >or=6.5%  Consistent with diabetes  Currently no consensus exists for use of hemoglobin A1C  for diagnosis of diabetes for children.     03/31/2015 5.3 4.5 - 6.2 % Final       TSH  Recent Labs   Lab 03/18/17  0508   TSH 6.157 H       The 10-year ASCVD risk score (Yannick GAO Jr., et al., 2013) is: 20.1%    Values used to calculate the score:      Age: 75 years      Sex: Male      Is Non- : No      Diabetic: No      Tobacco smoker: No      Systolic Blood Pressure: 106 mmHg      Is BP treated: Yes      HDL Cholesterol: 34 mg/dL      Total Cholesterol: 168 mg/dL             ASSESSMENT AND PLAN     Patient Active Problem List   Diagnosis    Essential hypertension    Other hyperlipidemia    Coronary artery disease involving native coronary artery of native heart without angina pectoris s/p RCA stent    Obstructive sleep apnea    GERD (gastroesophageal reflux disease)    Benign prostatic hyperplasia with urinary obstruction    Morbid obesity with body mass index of 45.0-49.9 in adult    Prostate cancer    Paroxysmal atrial fibrillation     Major depressive disorder, recurrent episode, mild    Generalized anxiety disorder    History of gout    Herpes simplex keratoconjunctivitis    Decreased range of motion of left ankle    Right leg weakness    Impaired  mobility    Balance problems    PVD (peripheral vascular disease)    Bilateral leg edema    Bilateral carotid artery stenosis    Gait abnormality    Left leg weakness    Delayed sleep phase syndrome    Floppy eyelid syndrome    Dyspnea    Insomnia    Restrictive lung disease    Personal history of asbestosis    Chronic ulcer of left ankle limited to breakdown of skin    Chest pain, atypical    CAD (coronary artery disease)         1.  Patient with ostial severe LAD disease, moderate distal left main LAD disease.  Now status post PCI of left main into LAD.  Symptoms have improved since PCI.  Still significantly deconditioned because of his morbid obesity.  Has been watching his diet and has started walking around more in an effort to lose weight.  Continue Plavix 75 mg daily uninterrupted for at least 1 year and longer if no contraindications.  Continue Eliquis 5 mg b.i.d..  If Eliquis has to be held for any reason, start aspirin 81 mg daily in lieu of Eliquis.    No complications from radial catheterization.  Radial pulse present.  No bleeding or hematoma noted.    Mediterranean diet, weight loss, aggressive risk factor modification has been recommended.    Follow-up with Dr. Sandhu in 3 months.      Thank you very much for involving me in the care of your patient.  Please do not hesitate to contact me if there are any questions.      Miguel Angel Brady MD, FACC, [facility]  Interventional Cardiologist, [facility].           This note was dictated with the help of speech recognition software.  There might be un-intended errors and/or substitutions.

## 2020-03-03 NOTE — PROGRESS NOTES
Subjective:       Patient ID:  Janusz Montgomery Jr. is a 75 y.o. male who presents for   Chief Complaint   Patient presents with    Skin Check     TBSE    Lesion     nose     History of Present Illness: The patient presents for follow up of skin check.    The patient was last seen on: 8/16/19 for cryosurgery to actinic keratoses which have resolved.   This is a high risk patient here to check for the development of new lesions.    Other skin complaints: bumps on nose x 1 month + scabs. No prev treatment        Review of Systems   Constitutional: Negative for fever, chills and weight loss (wt # 360# - stable).   Skin: Positive for daily sunscreen use, activity-related sunscreen use ( rarely outdoors) and wears hat ( when outdoors for long periods of time). Negative for itching, rash and recent sunburn.   Hematologic/Lymphatic: Bruises/bleeds easily (takes eliquis and plavix (recent stent placed)).        Objective:    Physical Exam   Constitutional: He appears well-developed and well-nourished. He is obese.  No distress.   HENT:   Mouth/Throat: Lips normal.    Eyes: Lids are normal.  No conjunctival no injection.   Cardiovascular: There is dependent edema (legs).     Lymphadenopathy:        Head (right side): No submental, no submandibular, no preauricular, no posterior auricular and no occipital adenopathy present.        Head (left side): No submental, no submandibular, no preauricular, no posterior auricular and no occipital adenopathy present.     He has no axillary adenopathy.   Neurological: He is alert and oriented to person, place, and time. He is not disoriented.   Psychiatric: He has a normal mood and affect.   Skin:   Areas Examined (abnormalities noted in diagram):   Scalp / Hair Palpated and Inspected  Head / Face Inspection Performed  Neck Inspection Performed  Chest / Axilla Inspection Performed  Abdomen Inspection Performed  Genitals / Buttocks / Groin Inspection Performed  Back Inspection  Performed  RUE Inspected  LUE Inspection Performed  RLE Inspected  LLE Inspection Performed  Nails and Digits Inspection Performed                   Diagram Legend     Erythematous scaling macule/papule c/w actinic keratosis       Vascular papule c/w angioma      Pigmented verrucoid papule/plaque c/w seborrheic keratosis      Yellow umbilicated papule c/w sebaceous hyperplasia      Irregularly shaped tan macule c/w lentigo     1-2 mm smooth white papules consistent with Milia      Movable subcutaneous cyst with punctum c/w epidermal inclusion cyst      Subcutaneous movable cyst c/w pilar cyst      Firm pink to brown papule c/w dermatofibroma      Pedunculated fleshy papule(s) c/w skin tag(s)      Evenly pigmented macule c/w junctional nevus     Mildly variegated pigmented, slightly irregular-bordered macule c/w mildly atypical nevus      Flesh colored to evenly pigmented papule c/w intradermal nevus       Pink pearly papule/plaque c/w basal cell carcinoma      Erythematous hyperkeratotic cursted plaque c/w SCC      Surgical scar with no sign of skin cancer recurrence      Open and closed comedones      Inflammatory papules and pustules      Verrucoid papule consistent consistent with wart     Erythematous eczematous patches and plaques     Dystrophic onycholytic nail with subungual debris c/w onychomycosis     Umbilicated papule    Erythematous-base heme-crusted tan verrucoid plaque consistent with inflamed seborrheic keratosis     Erythematous Silvery Scaling Plaque c/w Psoriasis     See annotation      Assessment / Plan:        AK (actinic keratosis)  Cryosurgery Procedure Note    Verbal consent from the patient is obtained including, but not limited to, risk of hypopigmentation/hyperpigmentation, scar, recurrence of lesion. The patient is aware of the precancerous quality and need for treatment of these lesions. Liquid nitrogen cryosurgery is applied to the 6 actinic keratoses, as detailed in the physical exam,  to produce a freeze injury. The patient is aware that blisters may form and is instructed on wound care with gentle cleansing and use of vaseline ointment to keep moist until healed. The patient is supplied a handout on cryosurgery and is instructed to call if lesions do not completely resolve.      SK (seborrheic keratosis)  These are benign inherited growths without a malignant potential. Reassurance given to patient. No treatment is necessary.       Benign lichenoid keratosis  Reassurance given to patient. No treatment is necessary.   Treatment of benign, asymptomatic lesions may be considered cosmetic.      Sebaceous gland hyperplasia   - stable and chronic      Personal history of other malignant neoplasm of skin  Area(s) of previous NMSC evaluated with no signs of recurrence.      Total body skin examination performed today including at least 12 points as noted in physical examination. No lesions suspicious for malignancy noted.               Follow up in about 6 months (around 9/3/2020).

## 2020-03-05 NOTE — PROGRESS NOTES
Subjective:      Patient ID: Janusz Montgomery Jr. is a 75 y.o. male.    Chief Complaint: Wound Check (left leg)      Janusz Montgomery Jr. is a 75 y.o. malewho presents to the clinic for evaluation and treatment of high risk feet. Janusz Montgomery Jr.   has a past medical history of NAT (acute kidney injury) (3/19/2017), ALLERGIC RHINITIS, Anemia, Anxiety, Basal cell carcinoma (10/19/2018), Basal cell carcinoma (01/09/2019), Chronic rhinitis (5/3/2013), Chronic rhinitis (5/3/2013), Coronary artery disease involving native coronary artery of native heart without angina pectoris s/p RCA stent, Cortical cataract of both eyes (3/18/2016), Delayed sleep phase syndrome (3/13/2019), Depression, Erectile dysfunction (3/24/2014), Erectile dysfunction (3/24/2014), Essential hypertension, GERD (gastroesophageal reflux disease) (7/25/2012), Gout, arthritis, Grade III open fracture of left tibia and fibula s/p ex-fix on 7/1/16 and ORIF of left tibia on 7/15 (7/6/2016), H/O: iritis, Helicobacter pylori (H. 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  The patient's chief  complaint is an anterior left leg wound.  One month duration.  Secondary to mild trauma in the presence of edema.  Has attempted self treatment but wound has not healed.  Non painful. Denies nausea, vomiting, fever, chills. History of left foot and ankle ulcers.  This patient has documented high risk feet requiring routine maintenance secondary to peripheral vascular disease.    12/10/2019  Patient relates he has attempted to care for leg ulcer as instructed but wound dimensions have not improved.    12/19/2019 patient relates that he has kept dressing clean dry and intact without itching or discomfort.  He has begun taking antibiotics as prescribed without nausea, vomiting, diarrhea, fever, chills.  No new pedal complaints.    12/31/19: F/u left leg ulceration. Has been in unna boot without issue.     01/07/2020 Patient relates that he has kept dressing clean dry and intact without itching or discomfort. He relates increased callus to posterior heel.  He denies nausea, vomiting, diarrhea, fever, chills.  No new pedal complaints.    01/14/2020  Patient relates that he has kept dressing clean dry and intact without itching however he relates that dressing was slipping and therefore somewhat uncomfortable this week.   He denies nausea, vomiting, diarrhea, fever, chills.  No new pedal complaints.    01/21/2020 patient relates that he has kept dressing clean, dry, intact.  He has no pedal complaints.  He does eyes nausea, vomiting, fever, chills.  He is anxious to get out of Unna boot.    1/29/20: F/u left leg ulceration. Has been wearing tubi . Pt. Is concerned regarding wound appearance.     02/06/2020 patient relates that he has kept dressing clean, dry, intact.  He has no pedal complaints.  He does eyes nausea, vomiting, fever, chills.  He relates that he has a cardiovascular procedure scheduled next week.     02/13/2020 patient relates that he has kept dressing clean, dry, intact.  He has no pedal complaints.   He denies nausea, vomiting, fever, chills.      02/24/2020 patient relates that he has kept dressing clean, dry, and intact.  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 He denies nausea, vomiting, fever, chills.      03/02/2020 patient relates that he has kept dressing clean, dry, and intact.   He denies nausea, vomiting, fever, chills.      PCP: Miguelina Weathers MD    Date Last Seen by PCP:   Chief Complaint   Patient presents with    Wound Check     left leg     Current shoe gear: tennis shoe    Hemoglobin A1C   Date Value Ref Range Status   02/11/2020 5.6 4.0 - 5.6 % Final     Comment:     ADA Screening Guidelines:  5.7-6.4%  Consistent with prediabetes  >or=6.5%  Consistent with diabetes  High levels of fetal hemoglobin interfere with the HbA1C  assay. Heterozygous hemoglobin variants (HbS, HgC, etc)do  not significantly interfere with this assay.   However, presence of multiple variants may affect accuracy.     07/11/2016 5.3 4.5 - 6.2 % Final     Comment:     According to ADA guidelines, hemoglobin A1C <7.0% represents  optimal control in non-pregnant diabetic patients.  Different  metrics may apply to specific populations.   Standards of Medical Care in Diabetes - 2016.  For the purpose of screening for the presence of diabetes:  <5.7%     Consistent with the absence of diabetes  5.7-6.4%  Consistent with increasing risk for diabetes   (prediabetes)  >or=6.5%  Consistent with diabetes  Currently no consensus exists for use of hemoglobin A1C  for diagnosis of diabetes for children.     03/31/2015 5.3 4.5 - 6.2 % Final         Current Outpatient Medications on File Prior to Visit   Medication Sig Dispense Refill    acyclovir (ZOVIRAX) 800 MG Tab Take 1 tablet (800 mg total) by mouth 2 (two) times daily. 180  tablet 6    albuterol (PROVENTIL/VENTOLIN HFA) 90 mcg/actuation inhaler Inhale 2 puffs into the lungs every 6 (six) hours as needed for Wheezing. Rescue 18 g 0    apixaban (ELIQUIS) 5 mg Tab Take 1 tablet (5 mg total) by mouth 2 (two) times daily. 180 tablet 4    artificial tears (ISOPTO TEARS) 0.5 % ophthalmic solution Place 1 drop into both eyes as needed. (Patient taking differently: Place 1 drop into both eyes as needed (for dry eyes). )      atorvastatin (LIPITOR) 40 MG tablet Take 1 tablet (40 mg total) by mouth once daily. Please make a follow up visit with your doctor for more refills of this medication 90 tablet 0    azelastine (ASTELIN) 137 mcg (0.1 %) nasal spray 1 spray (137 mcg total) by Nasal route 2 (two) times daily. 30 mL 2    clopidogrel (PLAVIX) 75 mg tablet Take 1 tablet (75 mg total) by mouth once daily. 8 pills the first day 38 tablet 11    CYANOCOBALAMIN, VITAMIN B-12, (VITAMIN B-12 ORAL) Take 2,500 mcg by mouth once daily.      fluticasone propionate (FLONASE) 50 mcg/actuation nasal spray 1 spray (50 mcg total) by Each Nostril route once daily. 1 Bottle 0    furosemide (LASIX) 20 MG tablet Take 1 tablet (20 mg total) by mouth once daily. 90 tablet 0    melatonin 5 mg Tab Take 10 mg by mouth nightly.      multivitamin (MULTIPLE VITAMINS) per tablet Take 1 tablet by mouth once daily.      nitroGLYCERIN (NITROSTAT) 0.4 MG SL tablet Place 1 tablet (0.4 mg total) under the tongue every 5 (five) minutes as needed for Chest pain. 25 tablet 11    olmesartan (BENICAR) 20 MG tablet Take 1 tablet (20 mg total) by mouth once daily. (replaces ramipril for blood pressure) 90 tablet 1    oxyCODONE-acetaminophen (PERCOCET) 5-325 mg per tablet Take 1 tablet by mouth every 4 to 6 hours as needed for Pain. 20 tablet 0    tamsulosin (FLOMAX) 0.4 mg Cap Take 1 capsule (0.4 mg total) by mouth once daily. 90 capsule 3    venlafaxine (EFFEXOR) 75 MG tablet Take 2 tablets (150 mg total) by mouth 2  "(two) times daily. 360 tablet 0    vitamin D 1000 units Tab Take 1 tablet (1,000 Units total) by mouth once daily.      gabapentin (NEURONTIN) 300 MG capsule Take 1 capsule (300 mg total) by mouth 2 (two) times daily. 60 capsule 11     No current facility-administered medications on file prior to visit.        Allergies   Allergen Reactions    Ciprofloxacin Rash     Diffuse pruritic morbilliform rash developed 3/15/2017 after dose of cipro; previously in 2/2017 he had rash/fevers after initiation of cipro    Zosyn [Piperacillin-Tazobactam] Anaphylaxis     Diffuse pruritic morbilliform rash developed 3/15/2017.  Then, 430am dose on 3/16 and rash worsened with SOB/tachypnea but no hypoxemia.     Bacitracin Itching and Rash     Violaceous rash in area of topical Tx.        Past Surgical History:   Procedure Laterality Date    Cardiac stenting x2      CATARACT EXTRACTION W/  INTRAOCULAR LENS IMPLANT Right 3/29/2016    Dr. Conteh    CATARACT EXTRACTION W/  INTRAOCULAR LENS IMPLANT Left 4/12/2016        EXTERNAL FIXATION TIBIAL FRACTURE Left 07/01/2016    INSERTION OF IMPLANTABLE LOOP RECORDER  04/07/2017    LEFT HEART CATHETERIZATION Left 1/30/2020    Procedure: Left heart cath;  Surgeon: Benjamin Sandhu MD;  Location: [facility] [place] CATH LAB;  Service: Cardiology;  Laterality: Left;  RN PREOP 1/28/20--Pt starting Plavix loading dose today (8pills)- Dr. Sandhu aware.  Pt has a bandaged "non healing area to LLE"--Dr. Sandhu aware.    LEFT HEART CATHETERIZATION Left 2/14/2020    Procedure: Left heart cath, IVUS guided left main / LAD PCI. Noon start, radial approach;  Surgeon: Miguel Angel Brady MD;  Location: [facility] [place] CATH LAB;  Service: Cardiology;  Laterality: Left;  RN PRE OP 2-7-2020  BMI--45.61    ORIF TIBIA FRACTURE Left 07/15/2016    RADIOACTIVE SEED IMPLANTATION OF PROSTATE N/A 8/8/2018    Procedure: INSERTION, RADIOACTIVE SEED, PROSTATE;  Surgeon: Bipin Thompson MD;  [facility];  " Service: Urology;  Laterality: N/A;  1 hour    Squamous cell cancer removal x3 with Mohs surgery      TONSILLECTOMY      TOTAL KNEE ARTHROPLASTY  10/2012    trus/bx      ULTRASOUND GUIDANCE  2/14/2020    Procedure: Ultrasound Guidance;  Surgeon: Miguel Angel Brady MD;  Location: [facility] CATH LAB;  Service: Cardiology;;       Family History   Problem Relation Age of Onset    Skin cancer Father     Lung cancer Father     Cancer Father         smoker,     Alzheimer's disease Mother     Hypertension Mother     Cancer Sister         colon, lung cancer     No Known Problems Sister     Cancer Brother         skin cancer, polypectomy     Peripheral vascular disease Unknown     No Known Problems Maternal Aunt     No Known Problems Maternal Uncle     No Known Problems Paternal Aunt     No Known Problems Paternal Uncle     No Known Problems Maternal Grandmother     No Known Problems Maternal Grandfather     No Known Problems Paternal Grandmother     No Known Problems Paternal Grandfather     Melanoma Neg Hx     Psoriasis Neg Hx     Lupus Neg Hx     Eczema Neg Hx     Amblyopia Neg Hx     Blindness Neg Hx     Cataracts Neg Hx     Diabetes Neg Hx     Glaucoma Neg Hx     Macular degeneration Neg Hx     Retinal detachment Neg Hx     Strabismus Neg Hx     Stroke Neg Hx     Thyroid disease Neg Hx     Acne Neg Hx        Social History     Socioeconomic History    Marital status:      Spouse name: Not on file    Number of children: Not on file    Years of education: Not on file    Highest education level: Not on file   Occupational History    Occupation: Retired    Social Needs    Financial resource strain: Not hard at all    Food insecurity:     Worry: Never true     Inability: Never true    Transportation needs:     Medical: No     Non-medical: No   Tobacco Use    Smoking status: Never Smoker    Smokeless tobacco: Never Used   Substance and Sexual Activity    Alcohol use: Yes      "Frequency: 2-4 times a month     Drinks per session: 3 or 4     Binge frequency: Less than monthly     Comment: occasionally, beer    Drug use: Never    Sexual activity: Yes     Partners: Female   Lifestyle    Physical activity:     Days per week: Not on file     Minutes per session: Not on file    Stress: Not on file   Relationships    Social connections:     Talks on phone: Three times a week     Gets together: Once a week     Attends Islam service: More than 4 times per year     Active member of club or organization: Yes     Attends meetings of clubs or organizations: More than 4 times per year     Relationship status:    Other Topics Concern    Not on file   Social History Narrative    Not on file       Review of Systems   Constitution: Negative for chills and fever.   Cardiovascular: Negative for claudication and leg swelling.   Respiratory: Negative for cough and shortness of breath.    Skin: Positive for dry skin and poor wound healing. Negative for itching and rash.   Musculoskeletal: Positive for arthritis, joint pain, joint swelling and myalgias. Negative for falls and muscle weakness.   Gastrointestinal: Negative for diarrhea, nausea and vomiting.   Neurological: Positive for paresthesias. Negative for numbness, tremors and weakness.   Psychiatric/Behavioral: Negative for altered mental status and hallucinations.         Objective:       Vitals:    03/02/20 1431   BP: 104/62   Weight: (!) 153.4 kg (338 lb 3 oz)   Height: 6' 1" (1.854 m)   PainSc: 0-No pain       Physical Exam   Constitutional:  Non-toxic appearance. He does not have a sickly appearance. No distress.   Pt. is well-developed, well-nourished, appears stated age, in no acute distress, alert and oriented x 3. No evidence of depression, anxiety, or agitation. Calm, cooperative, and communicative. Appropriate interactions and affect.   Cardiovascular:   Pulses:       Dorsalis pedis pulses are 2+ on the right side, and 2+ on the " left side.        Posterior tibial pulses are 1+ on the right side, and 1+ on the left side.   There is decreased digital hair. Skin is atrophic, slightly hyperpigmented, and pitting edema nora (L>>R)     Pulmonary/Chest: No respiratory distress.   Musculoskeletal:        Right ankle: He exhibits swelling. No tenderness. No lateral malleolus, no medial malleolus, no AITFL, no CF ligament, no head of 5th metatarsal and no proximal fibula tenderness found. Achilles tendon exhibits no pain, no defect and normal Zabala's test results.        Left ankle: He exhibits swelling. No tenderness. No lateral malleolus, no medial malleolus, no AITFL, no CF ligament and no posterior TFL tenderness found. Achilles tendon exhibits no pain, no defect and normal Zabala's test results.        Right foot: There is no tenderness and no bony tenderness.        Left foot: There is no tenderness and no bony tenderness.   Decreased stride, station of gait.  apropulsive toe off.  Increased angle and base of gait.    Patient has hammertoes of digits 2-5 bilateral partially reducible without symptom today.     There is equinus deformity bilateral. There is limitation of dorsiflexion with knees extended and with knees flexed.    Shoes reveals lateral heel counter wear bilateral in athletic shoes.        Lymphadenopathy:   No lymphatic streaking    Negative lymphadenopathy bilateral popliteal fossa and tarsal tunnel.     Neurological:   Julian-Jenn 5.07 monofilament is intact bilateral feet. Sharp/dull sensation is also intact Bilateral feet. Proprioception is grossly intact. Vibratory sensation intact (pt able to sense vibration stop within 3-5 seconds)     Skin: Skin is warm and dry. Abrasion (left anterior leg as pictured, to level of subQ. local edema.  Granular base.  no fluctuance, no crepitus, no purulence) and lesion noted. No bruising, no burn, no laceration and no rash noted. He is not diaphoretic. No cyanosis or erythema. No  pallor. Nails show no clubbing.   Ulcer location: medial left ankle  Signs of infection: none  Drainage: none  Purulence: no  Crepitus/fluctuance: no  Periwound: Reddened  Base: thin epithelial itssue  Depth: skin  Probe to bone: no      Ulcer location: posterior left heel  Signs of infection:none  Drainage:  None  Periwound: intact  Base: thin epithelial with bleeding within layers     Psychiatric: His mood appears not anxious. His affect is not inappropriate. His speech is not slurred. He is not combative. He is communicative. He is attentive.   Nursing note reviewed.    03/02/2020 02/24/20 02/13/2020 02/06/20 1/31/20 01/21/20 01/14/20 01/07/20 12/31/19: Epic unable to load pic.     12/19/2019          12/10/19          11/14/19          Assessment:       Encounter Diagnoses   Name Primary?    PVD (peripheral vascular disease) Yes    Venous stasis ulcer of left lower leg with edema of left lower leg     Venous stasis of lower extremity          Plan:       Janusz was seen today for wound check.    Diagnoses and all orders for this visit:    PVD (peripheral vascular disease)    Venous stasis ulcer of left lower leg with edema of left lower leg    Venous stasis of lower extremity      I counseled the patient on his conditions, their implications and medical management.    The site is cleansed of foreign material as much as possible. The patient is alerted to watch for any signs of infection (redness, pus, pain, increased swelling or fever) and call if such occurs.    Some Wound regression secondary to increased lower extremity edema, without signs of infection    Fabienne and Unna boot applied Detailed home care instructions dispensed.     Patient is to elevate legs. When sleeping, place a pillow under lower extremities. When sitting, support the legs so that they are level with the waist.    Follow-up:Patient is to return to the  clinic in 1 weeks for follow-up but should call [facility] immediately if any signs of infection, such as fever, chills, sweats, increased redness or pain.    Short-term goals include maintaining good offloading and minimizing bioburden, promoting granulation and epithelialization to healing.  Long-term goals include keeping the wound healed by good offloading and medical management under the direction of internist.

## 2020-03-06 NOTE — PROGRESS NOTES
Health  attempted contact 3/5/20  Push back follow up  BP at goal, average 103/55 mmHg  Assess s/s of hypotension  Patient s/p cardiac stenting    Hypertension Medications             furosemide (LASIX) 20 MG tablet Take 1 tablet (20 mg total) by mouth once daily.    nitroGLYCERIN (NITROSTAT) 0.4 MG SL tablet Place 1 tablet (0.4 mg total) under the tongue every 5 (five) minutes as needed for Chest pain.    olmesartan (BENICAR) 20 MG tablet Take 1 tablet (20 mg total) by mouth once daily. (replaces ramipril for blood pressure)

## 2020-03-09 ENCOUNTER — CLINICAL SUPPORT (OUTPATIENT)
Dept: CARDIOLOGY | Facility: HOSPITAL | Age: 75
End: 2020-03-09
Attending: INTERNAL MEDICINE
Payer: MEDICARE

## 2020-03-09 ENCOUNTER — PATIENT OUTREACH (OUTPATIENT)
Dept: ADMINISTRATIVE | Facility: OTHER | Age: 75
End: 2020-03-09

## 2020-03-09 ENCOUNTER — OFFICE VISIT (OUTPATIENT)
Dept: PODIATRY | Facility: CLINIC | Age: 75
End: 2020-03-09
Payer: MEDICARE

## 2020-03-09 VITALS
WEIGHT: 315 LBS | SYSTOLIC BLOOD PRESSURE: 108 MMHG | HEIGHT: 73 IN | DIASTOLIC BLOOD PRESSURE: 62 MMHG | BODY MASS INDEX: 41.75 KG/M2

## 2020-03-09 DIAGNOSIS — R60.0 VENOUS STASIS ULCER OF LEFT LOWER LEG WITH EDEMA OF LEFT LOWER LEG: ICD-10-CM

## 2020-03-09 DIAGNOSIS — I83.029 VENOUS STASIS ULCER OF LEFT LOWER LEG WITH EDEMA OF LEFT LOWER LEG: ICD-10-CM

## 2020-03-09 DIAGNOSIS — L97.929 VENOUS STASIS ULCER OF LEFT LOWER LEG WITH EDEMA OF LEFT LOWER LEG: ICD-10-CM

## 2020-03-09 DIAGNOSIS — Z46.9 FITTING AND ADJUSTMENT OF DEVICE: ICD-10-CM

## 2020-03-09 DIAGNOSIS — I83.892 VENOUS STASIS ULCER OF LEFT LOWER LEG WITH EDEMA OF LEFT LOWER LEG: ICD-10-CM

## 2020-03-09 DIAGNOSIS — I73.9 PVD (PERIPHERAL VASCULAR DISEASE): Primary | ICD-10-CM

## 2020-03-09 DIAGNOSIS — I87.8 VENOUS STASIS OF LOWER EXTREMITY: ICD-10-CM

## 2020-03-09 PROCEDURE — 3074F SYST BP LT 130 MM HG: CPT | Mod: HCNC,CPTII,S$GLB, | Performed by: PODIATRIST

## 2020-03-09 PROCEDURE — 1101F PT FALLS ASSESS-DOCD LE1/YR: CPT | Mod: HCNC,CPTII,S$GLB, | Performed by: PODIATRIST

## 2020-03-09 PROCEDURE — 93298 CARDIAC DEVICE CHECK - REMOTE: ICD-10-PCS | Mod: HCNC,,, | Performed by: INTERNAL MEDICINE

## 2020-03-09 PROCEDURE — 99213 OFFICE O/P EST LOW 20 MIN: CPT | Mod: HCNC,S$GLB,, | Performed by: PODIATRIST

## 2020-03-09 PROCEDURE — 1125F PR PAIN SEVERITY QUANTIFIED, PAIN PRESENT: ICD-10-PCS | Mod: HCNC,S$GLB,, | Performed by: PODIATRIST

## 2020-03-09 PROCEDURE — 99213 PR OFFICE/OUTPT VISIT, EST, LEVL III, 20-29 MIN: ICD-10-PCS | Mod: HCNC,S$GLB,, | Performed by: PODIATRIST

## 2020-03-09 PROCEDURE — 1159F PR MEDICATION LIST DOCUMENTED IN MEDICAL RECORD: ICD-10-PCS | Mod: HCNC,S$GLB,, | Performed by: PODIATRIST

## 2020-03-09 PROCEDURE — 1159F MED LIST DOCD IN RCRD: CPT | Mod: HCNC,S$GLB,, | Performed by: PODIATRIST

## 2020-03-09 PROCEDURE — 3074F PR MOST RECENT SYSTOLIC BLOOD PRESSURE < 130 MM HG: ICD-10-PCS | Mod: HCNC,CPTII,S$GLB, | Performed by: PODIATRIST

## 2020-03-09 PROCEDURE — 1125F AMNT PAIN NOTED PAIN PRSNT: CPT | Mod: HCNC,S$GLB,, | Performed by: PODIATRIST

## 2020-03-09 PROCEDURE — 3078F PR MOST RECENT DIASTOLIC BLOOD PRESSURE < 80 MM HG: ICD-10-PCS | Mod: HCNC,CPTII,S$GLB, | Performed by: PODIATRIST

## 2020-03-09 PROCEDURE — 93298 REM INTERROG DEV EVAL SCRMS: CPT | Mod: HCNC,,, | Performed by: INTERNAL MEDICINE

## 2020-03-09 PROCEDURE — 3078F DIAST BP <80 MM HG: CPT | Mod: HCNC,CPTII,S$GLB, | Performed by: PODIATRIST

## 2020-03-09 PROCEDURE — G2066 INTER DEVC REMOTE 30D: HCPCS | Mod: HCNC | Performed by: INTERNAL MEDICINE

## 2020-03-09 PROCEDURE — 99999 PR PBB SHADOW E&M-EST. PATIENT-LVL III: CPT | Mod: PBBFAC,HCNC,, | Performed by: PODIATRIST

## 2020-03-09 PROCEDURE — 99999 PR PBB SHADOW E&M-EST. PATIENT-LVL III: ICD-10-PCS | Mod: PBBFAC,HCNC,, | Performed by: PODIATRIST

## 2020-03-09 PROCEDURE — 1101F PR PT FALLS ASSESS DOC 0-1 FALLS W/OUT INJ PAST YR: ICD-10-PCS | Mod: HCNC,CPTII,S$GLB, | Performed by: PODIATRIST

## 2020-03-12 NOTE — PROGRESS NOTES
Subjective:      Patient ID: Janusz Montgomery Jr. is a 75 y.o. male.    Chief Complaint: Wound Check      Janusz Montgomery Jr. is a 75 y.o. malewho presents to the clinic for evaluation and treatment of high risk feet. Janusz Montgomery Jr.   has a past medical history of NAT (acute kidney injury) (3/19/2017), ALLERGIC RHINITIS, Anemia, Anxiety, Basal cell carcinoma (10/19/2018), Basal cell carcinoma (01/09/2019), Chronic rhinitis (5/3/2013), Chronic rhinitis (5/3/2013), Coronary artery disease involving native coronary artery of native heart without angina pectoris s/p RCA stent, Cortical cataract of both eyes (3/18/2016), Delayed sleep phase syndrome (3/13/2019), Depression, Erectile dysfunction (3/24/2014), Erectile dysfunction (3/24/2014), Essential hypertension, GERD (gastroesophageal reflux disease) (7/25/2012), Gout, arthritis, Grade III open fracture of left tibia and fibula s/p ex-fix on 7/1/16 and ORIF of left tibia on 7/15 (7/6/2016), H/O: iritis, Helicobacter pylori (H. 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  The patient's chief complaint is  an anterior left leg wound.  One month duration.  Secondary to mild trauma in the presence of edema.  Has attempted self treatment but wound has not healed.  Non painful. Denies nausea, vomiting, fever, chills. History of left foot and ankle ulcers.  This patient has documented high risk feet requiring routine maintenance secondary to peripheral vascular disease.    12/10/2019  Patient relates he has attempted to care for leg ulcer as instructed but wound dimensions have not improved.    12/19/2019 patient relates that he has kept dressing clean dry and intact without itching or discomfort.  He has begun taking antibiotics as prescribed without nausea, vomiting, diarrhea, fever, chills.  No new pedal complaints.    12/31/19: F/u left leg ulceration. Has been in unna boot without issue.     01/07/2020 Patient relates that he has kept dressing clean dry and intact without itching or discomfort. He relates increased callus to posterior heel.  He denies nausea, vomiting, diarrhea, fever, chills.  No new pedal complaints.    01/14/2020  Patient relates that he has kept dressing clean dry and intact without itching however he relates that dressing was slipping and therefore somewhat uncomfortable this week.   He denies nausea, vomiting, diarrhea, fever, chills.  No new pedal complaints.    01/21/2020 patient relates that he has kept dressing clean, dry, intact.  He has no pedal complaints.  He does eyes nausea, vomiting, fever, chills.  He is anxious to get out of Unna boot.    1/29/20: F/u left leg ulceration. Has been wearing tubi . Pt. Is concerned regarding wound appearance.     02/06/2020 patient relates that he has kept dressing clean, dry, intact.  He has no pedal complaints.  He does eyes nausea, vomiting, fever, chills.  He relates that he has a cardiovascular procedure scheduled next week.     02/13/2020 patient relates that he has kept dressing clean, dry, intact.  He has no pedal complaints.  He denies  nausea, vomiting, fever, chills.      02/24/2020 patient relates that he has kept dressing clean, dry, and intact.  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 He denies nausea, vomiting, fever, chills.      03/02/2020 patient relates that he has kept dressing clean, dry, and intact.   He denies nausea, vomiting, fever, chills.      PCP: Miguelina Weathers MD    Date Last Seen by PCP:   Chief Complaint   Patient presents with    Wound Check     Current shoe gear: tennis shoe    Hemoglobin A1C   Date Value Ref Range Status   02/11/2020 5.6 4.0 - 5.6 % Final     Comment:     ADA Screening Guidelines:  5.7-6.4%  Consistent with prediabetes  >or=6.5%  Consistent with diabetes  High levels of fetal hemoglobin interfere with the HbA1C  assay. Heterozygous hemoglobin variants (HbS, HgC, etc)do  not significantly interfere with this assay.   However, presence of multiple variants may affect accuracy.     07/11/2016 5.3 4.5 - 6.2 % Final     Comment:     According to ADA guidelines, hemoglobin A1C <7.0% represents  optimal control in non-pregnant diabetic patients.  Different  metrics may apply to specific populations.   Standards of Medical Care in Diabetes - 2016.  For the purpose of screening for the presence of diabetes:  <5.7%     Consistent with the absence of diabetes  5.7-6.4%  Consistent with increasing risk for diabetes   (prediabetes)  >or=6.5%  Consistent with diabetes  Currently no consensus exists for use of hemoglobin A1C  for diagnosis of diabetes for children.     03/31/2015 5.3 4.5 - 6.2 % Final         Current Outpatient Medications on File Prior to Visit   Medication Sig Dispense Refill    acyclovir (ZOVIRAX) 800 MG Tab Take 1 tablet (800 mg total) by mouth 2 (two) times daily. 180 tablet 6    albuterol  (PROVENTIL/VENTOLIN HFA) 90 mcg/actuation inhaler Inhale 2 puffs into the lungs every 6 (six) hours as needed for Wheezing. Rescue 18 g 0    apixaban (ELIQUIS) 5 mg Tab Take 1 tablet (5 mg total) by mouth 2 (two) times daily. 180 tablet 4    artificial tears (ISOPTO TEARS) 0.5 % ophthalmic solution Place 1 drop into both eyes as needed. (Patient taking differently: Place 1 drop into both eyes as needed (for dry eyes). )      atorvastatin (LIPITOR) 40 MG tablet Take 1 tablet (40 mg total) by mouth once daily. Please make a follow up visit with your doctor for more refills of this medication 90 tablet 0    azelastine (ASTELIN) 137 mcg (0.1 %) nasal spray 1 spray (137 mcg total) by Nasal route 2 (two) times daily. 30 mL 2    clopidogrel (PLAVIX) 75 mg tablet Take 1 tablet (75 mg total) by mouth once daily. 8 pills the first day 38 tablet 11    CYANOCOBALAMIN, VITAMIN B-12, (VITAMIN B-12 ORAL) Take 2,500 mcg by mouth once daily.      fluticasone propionate (FLONASE) 50 mcg/actuation nasal spray 1 spray (50 mcg total) by Each Nostril route once daily. 1 Bottle 0    furosemide (LASIX) 20 MG tablet Take 1 tablet (20 mg total) by mouth once daily. 90 tablet 0    melatonin 5 mg Tab Take 10 mg by mouth nightly.      multivitamin (MULTIPLE VITAMINS) per tablet Take 1 tablet by mouth once daily.      nitroGLYCERIN (NITROSTAT) 0.4 MG SL tablet Place 1 tablet (0.4 mg total) under the tongue every 5 (five) minutes as needed for Chest pain. 25 tablet 11    olmesartan (BENICAR) 20 MG tablet Take 1 tablet (20 mg total) by mouth once daily. (replaces ramipril for blood pressure) 90 tablet 1    oxyCODONE-acetaminophen (PERCOCET) 5-325 mg per tablet Take 1 tablet by mouth every 4 to 6 hours as needed for Pain. 20 tablet 0    tamsulosin (FLOMAX) 0.4 mg Cap Take 1 capsule (0.4 mg total) by mouth once daily. 90 capsule 3    venlafaxine (EFFEXOR) 75 MG tablet Take 2 tablets (150 mg total) by mouth 2 (two) times daily. 360  "tablet 0    vitamin D 1000 units Tab Take 1 tablet (1,000 Units total) by mouth once daily.      gabapentin (NEURONTIN) 300 MG capsule Take 1 capsule (300 mg total) by mouth 2 (two) times daily. 60 capsule 11     No current facility-administered medications on file prior to visit.        Allergies   Allergen Reactions    Ciprofloxacin Rash     Diffuse pruritic morbilliform rash developed 3/15/2017 after dose of cipro; previously in 2/2017 he had rash/fevers after initiation of cipro    Zosyn [Piperacillin-Tazobactam] Anaphylaxis     Diffuse pruritic morbilliform rash developed 3/15/2017.  Then, 430am dose on 3/16 and rash worsened with SOB/tachypnea but no hypoxemia.     Bacitracin Itching and Rash     Violaceous rash in area of topical Tx.        Past Surgical History:   Procedure Laterality Date    Cardiac stenting x2      CATARACT EXTRACTION W/  INTRAOCULAR LENS IMPLANT Right 3/29/2016    Dr. Conteh    CATARACT EXTRACTION W/  INTRAOCULAR LENS IMPLANT Left 4/12/2016        EXTERNAL FIXATION TIBIAL FRACTURE Left 07/01/2016    INSERTION OF IMPLANTABLE LOOP RECORDER  04/07/2017    LEFT HEART CATHETERIZATION Left 1/30/2020    Procedure: Left heart cath;  Surgeon: Benjamin Sandhu MD;  Location: [facility] CATH LAB;  Service: Cardiology;  Laterality: Left;  RN PREOP 1/28/20--Pt starting Plavix loading dose today (8pills)- Dr. Sandhu aware.  Pt has a bandaged "non healing area to LLE"--Dr. Sandhu aware.    LEFT HEART CATHETERIZATION Left 2/14/2020    Procedure: Left heart cath, IVUS guided left main / LAD PCI. Noon start, radial approach;  Surgeon: Miguel Angel Brady MD;  Location: [facility] CATH LAB;  Service: Cardiology;  Laterality: Left;  RN PRE OP 2-7-2020  BMI--45.61    ORIF TIBIA FRACTURE Left 07/15/2016    RADIOACTIVE SEED IMPLANTATION OF PROSTATE N/A 8/8/2018    Procedure: INSERTION, RADIOACTIVE SEED, PROSTATE;  Surgeon: Bipin Thompson MD;  Location: [facility] OR [address] [place] [address];  Service: Urology;  " Laterality: N/A;  1 hour    Squamous cell cancer removal x3 with Mohs surgery      TONSILLECTOMY      TOTAL KNEE ARTHROPLASTY  10/2012    trus/bx      ULTRASOUND GUIDANCE  2/14/2020    Procedure: Ultrasound Guidance;  Surgeon: Miguel Angel Brady MD;  Location: [facility] CATH LAB;  Service: Cardiology;;       Family History   Problem Relation Age of Onset    Skin cancer Father     Lung cancer Father     Cancer Father         smoker,     Alzheimer's disease Mother     Hypertension Mother     Cancer Sister         colon, lung cancer     No Known Problems Sister     Cancer Brother         skin cancer, polypectomy     Peripheral vascular disease Unknown     No Known Problems Maternal Aunt     No Known Problems Maternal Uncle     No Known Problems Paternal Aunt     No Known Problems Paternal Uncle     No Known Problems Maternal Grandmother     No Known Problems Maternal Grandfather     No Known Problems Paternal Grandmother     No Known Problems Paternal Grandfather     Melanoma Neg Hx     Psoriasis Neg Hx     Lupus Neg Hx     Eczema Neg Hx     Amblyopia Neg Hx     Blindness Neg Hx     Cataracts Neg Hx     Diabetes Neg Hx     Glaucoma Neg Hx     Macular degeneration Neg Hx     Retinal detachment Neg Hx     Strabismus Neg Hx     Stroke Neg Hx     Thyroid disease Neg Hx     Acne Neg Hx        Social History     Socioeconomic History    Marital status:      Spouse name: Not on file    Number of children: Not on file    Years of education: Not on file    Highest education level: Not on file   Occupational History    Occupation: Retired    Social Needs    Financial resource strain: Not hard at all    Food insecurity:     Worry: Never true     Inability: Never true    Transportation needs:     Medical: No     Non-medical: No   Tobacco Use    Smoking status: Never Smoker    Smokeless tobacco: Never Used   Substance and Sexual Activity    Alcohol use: Yes     Frequency: 2-4 times a  "month     Drinks per session: 3 or 4     Binge frequency: Less than monthly     Comment: occasionally, beer    Drug use: Never    Sexual activity: Yes     Partners: Female   Lifestyle    Physical activity:     Days per week: Not on file     Minutes per session: Not on file    Stress: Not on file   Relationships    Social connections:     Talks on phone: Three times a week     Gets together: Once a week     Attends Orthodoxy service: More than 4 times per year     Active member of club or organization: Yes     Attends meetings of clubs or organizations: More than 4 times per year     Relationship status:    Other Topics Concern    Not on file   Social History Narrative    Not on file       Review of Systems   Constitution: Negative for chills and fever.   Cardiovascular: Negative for claudication and leg swelling.   Respiratory: Negative for cough and shortness of breath.    Skin: Positive for dry skin and poor wound healing. Negative for itching and rash.   Musculoskeletal: Positive for arthritis, joint pain, joint swelling and myalgias. Negative for falls and muscle weakness.   Gastrointestinal: Negative for diarrhea, nausea and vomiting.   Neurological: Positive for paresthesias. Negative for numbness, tremors and weakness.   Psychiatric/Behavioral: Negative for altered mental status and hallucinations.         Objective:       Vitals:    03/09/20 1451   BP: 108/62   Weight: (!) 153.6 kg (338 lb 10 oz)   Height: 6' 1" (1.854 m)   PainSc:   2       Physical Exam   Constitutional:  Non-toxic appearance. He does not have a sickly appearance. No distress.   Pt. is well-developed, well-nourished, appears stated age, in no acute distress, alert and oriented x 3. No evidence of depression, anxiety, or agitation. Calm, cooperative, and communicative. Appropriate interactions and affect.   Cardiovascular:   Pulses:       Dorsalis pedis pulses are 2+ on the right side, and 2+ on the left side.        Posterior " tibial pulses are 1+ on the right side, and 1+ on the left side.   There is decreased digital hair. Skin is atrophic, slightly hyperpigmented, and pitting edema nora (L>>R)     Pulmonary/Chest: No respiratory distress.   Musculoskeletal:        Right ankle: He exhibits swelling. No tenderness. No lateral malleolus, no medial malleolus, no AITFL, no CF ligament, no head of 5th metatarsal and no proximal fibula tenderness found. Achilles tendon exhibits no pain, no defect and normal Zabala's test results.        Left ankle: He exhibits swelling. No tenderness. No lateral malleolus, no medial malleolus, no AITFL, no CF ligament and no posterior TFL tenderness found. Achilles tendon exhibits no pain, no defect and normal Zabala's test results.        Right foot: There is no tenderness and no bony tenderness.        Left foot: There is no tenderness and no bony tenderness.   Decreased stride, station of gait.  apropulsive toe off.  Increased angle and base of gait.    Patient has hammertoes of digits 2-5 bilateral partially reducible without symptom today.     There is equinus deformity bilateral. There is limitation of dorsiflexion with knees extended and with knees flexed.    Shoes reveals lateral heel counter wear bilateral in athletic shoes.        Lymphadenopathy:   No lymphatic streaking    Negative lymphadenopathy bilateral popliteal fossa and tarsal tunnel.     Neurological:   Madison-Jenn 5.07 monofilament is intact bilateral feet. Sharp/dull sensation is also intact Bilateral feet. Proprioception is grossly intact. Vibratory sensation intact (pt able to sense vibration stop within 3-5 seconds)     Skin: Skin is warm and dry. Abrasion (left anterior leg as pictured, to level of subQ. local edema.  Granular base.  no fluctuance, no crepitus, no purulence) and lesion noted. No bruising, no burn, no laceration and no rash noted. He is not diaphoretic. No cyanosis or erythema. No pallor. Nails show no  clubbing.   Ulcer location: medial left ankle  Signs of infection: none  Drainage: none  Purulence: no  Crepitus/fluctuance: no  Periwound: Reddened  Base: thin epithelial itssue  Depth: skin  Probe to bone: no      Ulcer location: posterior left heel  Signs of infection:none  Drainage:  None  Periwound: intact  Base: thin epithelial with bleeding within layers     Psychiatric: His mood appears not anxious. His affect is not inappropriate. His speech is not slurred. He is not combative. He is communicative. He is attentive.   Nursing note reviewed.      03/09/2020 03/02/2020 02/24/20 02/13/2020 02/06/20 1/31/20 01/21/20 01/14/20 01/07/20 12/31/19: Epic unable to load pic.     12/19/2019          12/10/19          11/14/19          Assessment:       Encounter Diagnoses   Name Primary?    PVD (peripheral vascular disease) Yes    Venous stasis ulcer of left lower leg with edema of left lower leg     Venous stasis of lower extremity          Plan:       Janusz was seen today for wound check.    Diagnoses and all orders for this visit:    PVD (peripheral vascular disease)    Venous stasis ulcer of left lower leg with edema of left lower leg    Venous stasis of lower extremity      I counseled the patient on his conditions, their implications and medical management.    The site is cleansed of foreign material as much as possible. The patient is alerted to watch for any signs of infection (redness, pus, pain, increased swelling or fever) and call if such occurs.    Wound stagnant, without signs of infection.  May biopsy on next encounter    Fabienne and Unna boot applied Detailed home care instructions dispensed.     Patient is to elevate legs. When sleeping, place a pillow under lower extremities. When sitting, support the legs so that they are level with the waist.    Follow-up:Patient is to return to the clinic in 1 weeks for  follow-up but should call [facility] immediately if any signs of infection, such as fever, chills, sweats, increased redness or pain.    Short-term goals include maintaining good offloading and minimizing bioburden, promoting granulation and epithelialization to healing.  Long-term goals include keeping the wound healed by good offloading and medical management under the direction of internist.

## 2020-03-16 ENCOUNTER — PATIENT OUTREACH (OUTPATIENT)
Dept: ADMINISTRATIVE | Facility: OTHER | Age: 75
End: 2020-03-16

## 2020-03-16 ENCOUNTER — OFFICE VISIT (OUTPATIENT)
Dept: PODIATRY | Facility: CLINIC | Age: 75
End: 2020-03-16
Payer: MEDICARE

## 2020-03-16 VITALS
DIASTOLIC BLOOD PRESSURE: 56 MMHG | WEIGHT: 315 LBS | BODY MASS INDEX: 41.75 KG/M2 | SYSTOLIC BLOOD PRESSURE: 116 MMHG | HEIGHT: 73 IN

## 2020-03-16 DIAGNOSIS — I73.9 PVD (PERIPHERAL VASCULAR DISEASE): Primary | ICD-10-CM

## 2020-03-16 DIAGNOSIS — I83.029 VENOUS STASIS ULCER OF LEFT LOWER LEG WITH EDEMA OF LEFT LOWER LEG: ICD-10-CM

## 2020-03-16 DIAGNOSIS — F33.0 MAJOR DEPRESSIVE DISORDER, RECURRENT EPISODE, MILD: ICD-10-CM

## 2020-03-16 DIAGNOSIS — I83.892 VENOUS STASIS ULCER OF LEFT LOWER LEG WITH EDEMA OF LEFT LOWER LEG: ICD-10-CM

## 2020-03-16 DIAGNOSIS — R60.0 VENOUS STASIS ULCER OF LEFT LOWER LEG WITH EDEMA OF LEFT LOWER LEG: ICD-10-CM

## 2020-03-16 DIAGNOSIS — L97.929 VENOUS STASIS ULCER OF LEFT LOWER LEG WITH EDEMA OF LEFT LOWER LEG: ICD-10-CM

## 2020-03-16 PROCEDURE — 99999 PR PBB SHADOW E&M-EST. PATIENT-LVL III: ICD-10-PCS | Mod: PBBFAC,HCNC,, | Performed by: PODIATRIST

## 2020-03-16 PROCEDURE — 1126F AMNT PAIN NOTED NONE PRSNT: CPT | Mod: HCNC,S$GLB,, | Performed by: PODIATRIST

## 2020-03-16 PROCEDURE — 3074F SYST BP LT 130 MM HG: CPT | Mod: HCNC,CPTII,S$GLB, | Performed by: PODIATRIST

## 2020-03-16 PROCEDURE — 1101F PT FALLS ASSESS-DOCD LE1/YR: CPT | Mod: HCNC,CPTII,S$GLB, | Performed by: PODIATRIST

## 2020-03-16 PROCEDURE — 1126F PR PAIN SEVERITY QUANTIFIED, NO PAIN PRESENT: ICD-10-PCS | Mod: HCNC,S$GLB,, | Performed by: PODIATRIST

## 2020-03-16 PROCEDURE — 99999 PR PBB SHADOW E&M-EST. PATIENT-LVL III: CPT | Mod: PBBFAC,HCNC,, | Performed by: PODIATRIST

## 2020-03-16 PROCEDURE — 3078F DIAST BP <80 MM HG: CPT | Mod: HCNC,CPTII,S$GLB, | Performed by: PODIATRIST

## 2020-03-16 PROCEDURE — 1101F PR PT FALLS ASSESS DOC 0-1 FALLS W/OUT INJ PAST YR: ICD-10-PCS | Mod: HCNC,CPTII,S$GLB, | Performed by: PODIATRIST

## 2020-03-16 PROCEDURE — 99213 OFFICE O/P EST LOW 20 MIN: CPT | Mod: HCNC,S$GLB,, | Performed by: PODIATRIST

## 2020-03-16 PROCEDURE — 1159F MED LIST DOCD IN RCRD: CPT | Mod: HCNC,S$GLB,, | Performed by: PODIATRIST

## 2020-03-16 PROCEDURE — 1159F PR MEDICATION LIST DOCUMENTED IN MEDICAL RECORD: ICD-10-PCS | Mod: HCNC,S$GLB,, | Performed by: PODIATRIST

## 2020-03-16 PROCEDURE — 99213 PR OFFICE/OUTPT VISIT, EST, LEVL III, 20-29 MIN: ICD-10-PCS | Mod: HCNC,S$GLB,, | Performed by: PODIATRIST

## 2020-03-16 PROCEDURE — 3074F PR MOST RECENT SYSTOLIC BLOOD PRESSURE < 130 MM HG: ICD-10-PCS | Mod: HCNC,CPTII,S$GLB, | Performed by: PODIATRIST

## 2020-03-16 PROCEDURE — 3078F PR MOST RECENT DIASTOLIC BLOOD PRESSURE < 80 MM HG: ICD-10-PCS | Mod: HCNC,CPTII,S$GLB, | Performed by: PODIATRIST

## 2020-03-16 RX ORDER — VENLAFAXINE 75 MG/1
TABLET ORAL
Qty: 360 TABLET | Refills: 0 | Status: SHIPPED | OUTPATIENT
Start: 2020-03-16 | End: 2020-06-10

## 2020-03-16 NOTE — PROGRESS NOTES
Subjective:      Patient ID: Janusz Montgomery Jr. is a 75 y.o. male.    Chief Complaint: Wound Check (left leg Pcp Dr. Weathers ) and Wound Care      Janusz Montgomery Jr. is a 75 y.o. malewho presents to the clinic for evaluation and treatment of high risk feet. Janusz Montgomery Jr.   has a past medical history of NAT (acute kidney injury) (3/19/2017), ALLERGIC RHINITIS, Anemia, Anxiety, Basal cell carcinoma (10/19/2018), Basal cell carcinoma (01/09/2019), Chronic rhinitis (5/3/2013), Chronic rhinitis (5/3/2013), Coronary artery disease involving native coronary artery of native heart without angina pectoris s/p RCA stent, Cortical cataract of both eyes (3/18/2016), Delayed sleep phase syndrome (3/13/2019), Depression, Erectile dysfunction (3/24/2014), Erectile dysfunction (3/24/2014), Essential hypertension, GERD (gastroesophageal reflux disease) (7/25/2012), Gout, arthritis, Grade III open fracture of left tibia and fibula s/p ex-fix on 7/1/16 and ORIF of left tibia on 7/15 (7/6/2016), H/O: iritis, Helicobacter pylori (H. 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  The patient's chief complaint is an anterior left leg wound.  One month duration.  Secondary to mild trauma in the presence of edema.  Has attempted self treatment but wound has not healed.  Non painful. Denies nausea, vomiting, fever, chills. History of left foot and ankle ulcers.  This patient has documented high risk feet requiring routine maintenance secondary to peripheral vascular disease.    12/10/2019  Patient relates he has attempted to care for leg ulcer as instructed but wound dimensions have not improved.    12/19/2019 patient relates that he has kept dressing clean dry and intact without itching or discomfort.  He has begun taking antibiotics as prescribed without nausea, vomiting, diarrhea, fever, chills.  No new pedal complaints.    12/31/19: F/u left leg ulceration. Has been in unna boot without issue.     01/07/2020 Patient relates that he has kept dressing clean dry and intact without itching or discomfort. He relates increased callus to posterior heel.  He denies nausea, vomiting, diarrhea, fever, chills.  No new pedal complaints.    01/14/2020  Patient relates that he has kept dressing clean dry and intact without itching however he relates that dressing was slipping and therefore somewhat uncomfortable this week.   He denies nausea, vomiting, diarrhea, fever, chills.  No new pedal complaints.    01/21/2020 patient relates that he has kept dressing clean, dry, intact.  He has no pedal complaints.  He does eyes nausea, vomiting, fever, chills.  He is anxious to get out of Unna boot.    1/29/20: F/u left leg ulceration. Has been wearing tubi . Pt. Is concerned regarding wound appearance.     02/06/2020 patient relates that he has kept dressing clean, dry, intact.  He has no pedal complaints.  He does eyes nausea, vomiting, fever, chills.  He relates that he has a cardiovascular procedure scheduled next week.     02/13/2020 patient relates that he has kept dressing clean, dry,  intact.  He has no pedal complaints.  He denies nausea, vomiting, fever, chills.      02/24/2020 patient relates that he has kept dressing clean, dry, and intact.  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 He denies nausea, vomiting, fever, chills.      03/02/2020 patient relates that he has kept dressing clean, dry, and intact.   He denies nausea, vomiting, fever, chills.        03/16/2020 patient relates that he has kept dressing clean, dry, and intact.   He denies nausea, vomiting, fever, chills.        PCP: Miguelina Weathers MD    Date Last Seen by PCP:   Chief Complaint   Patient presents with    Wound Check     left leg Pcp Dr. Weathers     Wound Care     Current shoe gear: tennis shoe    Hemoglobin A1C   Date Value Ref Range Status   02/11/2020 5.6 4.0 - 5.6 % Final     Comment:     ADA Screening Guidelines:  5.7-6.4%  Consistent with prediabetes  >or=6.5%  Consistent with diabetes  High levels of fetal hemoglobin interfere with the HbA1C  assay. Heterozygous hemoglobin variants (HbS, HgC, etc)do  not significantly interfere with this assay.   However, presence of multiple variants may affect accuracy.     07/11/2016 5.3 4.5 - 6.2 % Final     Comment:     According to ADA guidelines, hemoglobin A1C <7.0% represents  optimal control in non-pregnant diabetic patients.  Different  metrics may apply to specific populations.   Standards of Medical Care in Diabetes - 2016.  For the purpose of screening for the presence of diabetes:  <5.7%     Consistent with the absence of diabetes  5.7-6.4%  Consistent with increasing risk for diabetes   (prediabetes)  >or=6.5%  Consistent with diabetes  Currently no consensus exists for use of hemoglobin A1C  for diagnosis of diabetes for children.     03/31/2015 5.3 4.5 - 6.2  % Final         Current Outpatient Medications on File Prior to Visit   Medication Sig Dispense Refill    acyclovir (ZOVIRAX) 800 MG Tab Take 1 tablet (800 mg total) by mouth 2 (two) times daily. 180 tablet 6    albuterol (PROVENTIL/VENTOLIN HFA) 90 mcg/actuation inhaler Inhale 2 puffs into the lungs every 6 (six) hours as needed for Wheezing. Rescue 18 g 0    apixaban (ELIQUIS) 5 mg Tab Take 1 tablet (5 mg total) by mouth 2 (two) times daily. 180 tablet 4    artificial tears (ISOPTO TEARS) 0.5 % ophthalmic solution Place 1 drop into both eyes as needed. (Patient taking differently: Place 1 drop into both eyes as needed (for dry eyes). )      atorvastatin (LIPITOR) 40 MG tablet Take 1 tablet (40 mg total) by mouth once daily. Please make a follow up visit with your doctor for more refills of this medication 90 tablet 0    azelastine (ASTELIN) 137 mcg (0.1 %) nasal spray 1 spray (137 mcg total) by Nasal route 2 (two) times daily. 30 mL 2    clopidogrel (PLAVIX) 75 mg tablet Take 1 tablet (75 mg total) by mouth once daily. 8 pills the first day 38 tablet 11    CYANOCOBALAMIN, VITAMIN B-12, (VITAMIN B-12 ORAL) Take 2,500 mcg by mouth once daily.      fluticasone propionate (FLONASE) 50 mcg/actuation nasal spray 1 spray (50 mcg total) by Each Nostril route once daily. 1 Bottle 0    furosemide (LASIX) 20 MG tablet Take 1 tablet (20 mg total) by mouth once daily. 90 tablet 0    melatonin 5 mg Tab Take 10 mg by mouth nightly.      multivitamin (MULTIPLE VITAMINS) per tablet Take 1 tablet by mouth once daily.      nitroGLYCERIN (NITROSTAT) 0.4 MG SL tablet Place 1 tablet (0.4 mg total) under the tongue every 5 (five) minutes as needed for Chest pain. 25 tablet 11    olmesartan (BENICAR) 20 MG tablet Take 1 tablet (20 mg total) by mouth once daily. (replaces ramipril for blood pressure) 90 tablet 1    oxyCODONE-acetaminophen (PERCOCET) 5-325 mg per tablet Take 1 tablet by mouth every 4 to 6 hours as needed for  "Pain. 20 tablet 0    tamsulosin (FLOMAX) 0.4 mg Cap Take 1 capsule (0.4 mg total) by mouth once daily. 90 capsule 3    vitamin D 1000 units Tab Take 1 tablet (1,000 Units total) by mouth once daily.      [DISCONTINUED] venlafaxine (EFFEXOR) 75 MG tablet Take 2 tablets (150 mg total) by mouth 2 (two) times daily. 360 tablet 0    gabapentin (NEURONTIN) 300 MG capsule Take 1 capsule (300 mg total) by mouth 2 (two) times daily. 60 capsule 11     No current facility-administered medications on file prior to visit.        Allergies   Allergen Reactions    Ciprofloxacin Rash     Diffuse pruritic morbilliform rash developed 3/15/2017 after dose of cipro; previously in 2/2017 he had rash/fevers after initiation of cipro    Zosyn [Piperacillin-Tazobactam] Anaphylaxis     Diffuse pruritic morbilliform rash developed 3/15/2017.  Then, 430am dose on 3/16 and rash worsened with SOB/tachypnea but no hypoxemia.     Bacitracin Itching and Rash     Violaceous rash in area of topical Tx.        Past Surgical History:   Procedure Laterality Date    Cardiac stenting x2      CATARACT EXTRACTION W/  INTRAOCULAR LENS IMPLANT Right 3/29/2016    Dr. Conteh    CATARACT EXTRACTION W/  INTRAOCULAR LENS IMPLANT Left 4/12/2016        EXTERNAL FIXATION TIBIAL FRACTURE Left 07/01/2016    INSERTION OF IMPLANTABLE LOOP RECORDER  04/07/2017    LEFT HEART CATHETERIZATION Left 1/30/2020    Procedure: Left heart cath;  Surgeon: Benjamin Sandhu MD;  Location: [facility] CATH LAB;  Service: Cardiology;  Laterality: Left;  RN PREOP 1/28/20--Pt starting Plavix loading dose today (8pills)- Dr. Sandhu aware.  Pt has a bandaged "non healing area to LLE"--Dr. Sandhu aware.    LEFT HEART CATHETERIZATION Left 2/14/2020    Procedure: Left heart cath, IVUS guided left main / LAD PCI. Noon start, radial approach;  Surgeon: Miguel Angel Brady MD;  Location: [facility] CATH LAB;  Service: Cardiology;  Laterality: Left;  RN PRE OP 2-7-2020  BMI--45.61 "    ORIF TIBIA FRACTURE Left 07/15/2016    RADIOACTIVE SEED IMPLANTATION OF PROSTATE N/A 8/8/2018    Procedure: INSERTION, RADIOACTIVE SEED, PROSTATE;  Surgeon: Bipin Thompson MD;  Location: [facility] OR [address] [place] [address];  Service: Urology;  Laterality: N/A;  1 hour    Squamous cell cancer removal x3 with Mohs surgery      TONSILLECTOMY      TOTAL KNEE ARTHROPLASTY  10/2012    trus/bx      ULTRASOUND GUIDANCE  2/14/2020    Procedure: Ultrasound Guidance;  Surgeon: Miguel Angel Brady MD;  Location: [facility] CATH LAB;  Service: Cardiology;;       Family History   Problem Relation Age of Onset    Skin cancer Father     Lung cancer Father     Cancer Father         smoker,     Alzheimer's disease Mother     Hypertension Mother     Cancer Sister         colon, lung cancer     No Known Problems Sister     Cancer Brother         skin cancer, polypectomy     Peripheral vascular disease Unknown     No Known Problems Maternal Aunt     No Known Problems Maternal Uncle     No Known Problems Paternal Aunt     No Known Problems Paternal Uncle     No Known Problems Maternal Grandmother     No Known Problems Maternal Grandfather     No Known Problems Paternal Grandmother     No Known Problems Paternal Grandfather     Melanoma Neg Hx     Psoriasis Neg Hx     Lupus Neg Hx     Eczema Neg Hx     Amblyopia Neg Hx     Blindness Neg Hx     Cataracts Neg Hx     Diabetes Neg Hx     Glaucoma Neg Hx     Macular degeneration Neg Hx     Retinal detachment Neg Hx     Strabismus Neg Hx     Stroke Neg Hx     Thyroid disease Neg Hx     Acne Neg Hx        Social History     Socioeconomic History    Marital status:      Spouse name: Not on file    Number of children: Not on file    Years of education: Not on file    Highest education level: Not on file   Occupational History    Occupation: Retired    Social Needs    Financial resource strain: Not hard at all    Food insecurity:     Worry: Never true     Inability: Never  "true    Transportation needs:     Medical: No     Non-medical: No   Tobacco Use    Smoking status: Never Smoker    Smokeless tobacco: Never Used   Substance and Sexual Activity    Alcohol use: Yes     Frequency: 2-4 times a month     Drinks per session: 3 or 4     Binge frequency: Less than monthly     Comment: occasionally, beer    Drug use: Never    Sexual activity: Yes     Partners: Female   Lifestyle    Physical activity:     Days per week: Not on file     Minutes per session: Not on file    Stress: Not on file   Relationships    Social connections:     Talks on phone: Three times a week     Gets together: Once a week     Attends Sikhism service: More than 4 times per year     Active member of club or organization: Yes     Attends meetings of clubs or organizations: More than 4 times per year     Relationship status:    Other Topics Concern    Not on file   Social History Narrative    Not on file       Review of Systems   Constitution: Negative for chills and fever.   Cardiovascular: Negative for claudication and leg swelling.   Respiratory: Negative for cough and shortness of breath.    Skin: Positive for dry skin and poor wound healing. Negative for itching and rash.   Musculoskeletal: Positive for arthritis, joint pain, joint swelling and myalgias. Negative for falls and muscle weakness.   Gastrointestinal: Negative for diarrhea, nausea and vomiting.   Neurological: Positive for paresthesias. Negative for numbness, tremors and weakness.   Psychiatric/Behavioral: Negative for altered mental status and hallucinations.         Objective:       Vitals:    03/16/20 1658   BP: (!) 116/56   Weight: (!) 153.3 kg (338 lb)   Height: 6' 1" (1.854 m)   PainSc: 0-No pain       Physical Exam   Constitutional:  Non-toxic appearance. He does not have a sickly appearance. No distress.   Pt. is well-developed, well-nourished, appears stated age, in no acute distress, alert and oriented x 3. No evidence of " depression, anxiety, or agitation. Calm, cooperative, and communicative. Appropriate interactions and affect.   Cardiovascular:   Pulses:       Dorsalis pedis pulses are 2+ on the right side, and 2+ on the left side.        Posterior tibial pulses are 1+ on the right side, and 1+ on the left side.   There is decreased digital hair. Skin is atrophic, slightly hyperpigmented, and pitting edema nora (L>>R)     Pulmonary/Chest: No respiratory distress.   Musculoskeletal:        Right ankle: He exhibits swelling. No tenderness. No lateral malleolus, no medial malleolus, no AITFL, no CF ligament, no head of 5th metatarsal and no proximal fibula tenderness found. Achilles tendon exhibits no pain, no defect and normal Zabala's test results.        Left ankle: He exhibits swelling. No tenderness. No lateral malleolus, no medial malleolus, no AITFL, no CF ligament and no posterior TFL tenderness found. Achilles tendon exhibits no pain, no defect and normal Zabala's test results.        Right foot: There is no tenderness and no bony tenderness.        Left foot: There is no tenderness and no bony tenderness.   Decreased stride, station of gait.  apropulsive toe off.  Increased angle and base of gait.    Patient has hammertoes of digits 2-5 bilateral partially reducible without symptom today.     There is equinus deformity bilateral. There is limitation of dorsiflexion with knees extended and with knees flexed.    Shoes reveals lateral heel counter wear bilateral in athletic shoes.        Lymphadenopathy:   No lymphatic streaking    Negative lymphadenopathy bilateral popliteal fossa and tarsal tunnel.     Neurological:   [place] 5.07 monofilament is intact bilateral feet. Sharp/dull sensation is also intact Bilateral feet. Proprioception is grossly intact. Vibratory sensation intact (pt able to sense vibration stop within 3-5 seconds)     Skin: Skin is warm and dry. Abrasion (left anterior leg as pictured, to level  of subQ. local edema.  Granular base.  no fluctuance, no crepitus, no purulence) and lesion noted. No bruising, no burn, no laceration and no rash noted. He is not diaphoretic. No cyanosis or erythema. No pallor. Nails show no clubbing.   Ulcer location: medial left ankle  Signs of infection: none  Drainage: none  Purulence: no  Crepitus/fluctuance: no  Periwound: Reddened  Base: thin epithelial itssue  Depth: skin  Probe to bone: no      Ulcer location: posterior left heel  Signs of infection:none  Drainage:  None  Periwound: intact  Base: thin epithelial with bleeding within layers     Psychiatric: His mood appears not anxious. His affect is not inappropriate. His speech is not slurred. He is not combative. He is communicative. He is attentive.   Nursing note reviewed.    03/16/2020 03/09/2020 03/02/2020 02/24/20 02/13/2020 02/06/20 1/31/20 01/21/20 01/14/20 01/07/20 12/31/19: Epic unable to load pic.     12/19/2019          12/10/19          11/14/19          Assessment:       Encounter Diagnoses   Name Primary?    PVD (peripheral vascular disease) Yes    Venous stasis ulcer of left lower leg with edema of left lower leg          Plan:       Janusz was seen today for wound check and wound care.    Diagnoses and all orders for this visit:    PVD (peripheral vascular disease)    Venous stasis ulcer of left lower leg with edema of left lower leg      I counseled the patient on his conditions, their implications and medical management.    The site is cleansed of foreign material as much as possible. The patient is alerted to watch for any signs of infection (redness, pus, pain, increased swelling or fever) and call if such occurs.    Wound stagnant, without signs of infection.  May biopsy on next encounter    MepileNidia and Unna boot applied Detailed home care instructions dispensed.     Patient is to elevate  legs. When sleeping, place a pillow under lower extremities. When sitting, support the legs so that they are level with the waist.    Follow-up:Patient is to return to the clinic in 1 weeks for follow-up but should call [facility] immediately if any signs of infection, such as fever, chills, sweats, increased redness or pain.    Short-term goals include maintaining good offloading and minimizing bioburden, promoting granulation and epithelialization to healing.  Long-term goals include keeping the wound healed by good offloading and medical management under the direction of internist.

## 2020-03-17 ENCOUNTER — PATIENT MESSAGE (OUTPATIENT)
Dept: UROLOGY | Facility: CLINIC | Age: 75
End: 2020-03-17

## 2020-03-17 ENCOUNTER — PATIENT MESSAGE (OUTPATIENT)
Dept: FAMILY MEDICINE | Facility: CLINIC | Age: 75
End: 2020-03-17

## 2020-03-17 RX ORDER — VENLAFAXINE 75 MG/1
150 TABLET ORAL 2 TIMES DAILY
Qty: 360 TABLET | Refills: 0 | OUTPATIENT
Start: 2020-03-17

## 2020-03-20 RX ORDER — CLOPIDOGREL BISULFATE 75 MG/1
75 TABLET ORAL DAILY
Qty: 90 TABLET | Refills: 3 | Status: SHIPPED | OUTPATIENT
Start: 2020-03-20 | End: 2021-03-29 | Stop reason: SDUPTHER

## 2020-03-23 ENCOUNTER — OFFICE VISIT (OUTPATIENT)
Dept: PODIATRY | Facility: CLINIC | Age: 75
End: 2020-03-23
Payer: MEDICARE

## 2020-03-23 VITALS — HEIGHT: 73 IN | BODY MASS INDEX: 41.75 KG/M2 | WEIGHT: 315 LBS

## 2020-03-23 DIAGNOSIS — B35.3 TINEA PEDIS OF LEFT FOOT: ICD-10-CM

## 2020-03-23 DIAGNOSIS — I73.9 PVD (PERIPHERAL VASCULAR DISEASE): Primary | ICD-10-CM

## 2020-03-23 DIAGNOSIS — I83.892 VENOUS STASIS ULCER OF LEFT LOWER LEG WITH EDEMA OF LEFT LOWER LEG: ICD-10-CM

## 2020-03-23 DIAGNOSIS — R60.0 VENOUS STASIS ULCER OF LEFT LOWER LEG WITH EDEMA OF LEFT LOWER LEG: ICD-10-CM

## 2020-03-23 DIAGNOSIS — I83.029 VENOUS STASIS ULCER OF LEFT LOWER LEG WITH EDEMA OF LEFT LOWER LEG: ICD-10-CM

## 2020-03-23 DIAGNOSIS — L97.929 VENOUS STASIS ULCER OF LEFT LOWER LEG WITH EDEMA OF LEFT LOWER LEG: ICD-10-CM

## 2020-03-23 PROCEDURE — 99999 PR PBB SHADOW E&M-EST. PATIENT-LVL III: CPT | Mod: PBBFAC,HCNC,, | Performed by: PODIATRIST

## 2020-03-23 PROCEDURE — 99214 PR OFFICE/OUTPT VISIT, EST, LEVL IV, 30-39 MIN: ICD-10-PCS | Mod: HCNC,S$GLB,, | Performed by: PODIATRIST

## 2020-03-23 PROCEDURE — 99214 OFFICE O/P EST MOD 30 MIN: CPT | Mod: HCNC,S$GLB,, | Performed by: PODIATRIST

## 2020-03-23 PROCEDURE — 1126F PR PAIN SEVERITY QUANTIFIED, NO PAIN PRESENT: ICD-10-PCS | Mod: HCNC,S$GLB,, | Performed by: PODIATRIST

## 2020-03-23 PROCEDURE — 1159F PR MEDICATION LIST DOCUMENTED IN MEDICAL RECORD: ICD-10-PCS | Mod: HCNC,S$GLB,, | Performed by: PODIATRIST

## 2020-03-23 PROCEDURE — 1101F PR PT FALLS ASSESS DOC 0-1 FALLS W/OUT INJ PAST YR: ICD-10-PCS | Mod: HCNC,CPTII,S$GLB, | Performed by: PODIATRIST

## 2020-03-23 PROCEDURE — 1159F MED LIST DOCD IN RCRD: CPT | Mod: HCNC,S$GLB,, | Performed by: PODIATRIST

## 2020-03-23 PROCEDURE — 1101F PT FALLS ASSESS-DOCD LE1/YR: CPT | Mod: HCNC,CPTII,S$GLB, | Performed by: PODIATRIST

## 2020-03-23 PROCEDURE — 1126F AMNT PAIN NOTED NONE PRSNT: CPT | Mod: HCNC,S$GLB,, | Performed by: PODIATRIST

## 2020-03-23 PROCEDURE — 99999 PR PBB SHADOW E&M-EST. PATIENT-LVL III: ICD-10-PCS | Mod: PBBFAC,HCNC,, | Performed by: PODIATRIST

## 2020-03-23 NOTE — PROGRESS NOTES
Subjective:      Patient ID: Janusz Montgomery Jr. is a 75 y.o. male.    Chief Complaint: Follow-up (right leg, ov Dr Weathers PCP 2/17/20)      Janusz Montgomery Jr. is a 75 y.o. malewho presents to the clinic for evaluation and treatment of high risk feet. Janusz Montgomery Jr.   has a past medical history of NAT (acute kidney injury) (3/19/2017), ALLERGIC RHINITIS, Anemia, Anxiety, Basal cell carcinoma (10/19/2018), Basal cell carcinoma (01/09/2019), Chronic rhinitis (5/3/2013), Chronic rhinitis (5/3/2013), Coronary artery disease involving native coronary artery of native heart without angina pectoris s/p RCA stent, Cortical cataract of both eyes (3/18/2016), Delayed sleep phase syndrome (3/13/2019), Depression, Erectile dysfunction (3/24/2014), Erectile dysfunction (3/24/2014), Essential hypertension, GERD (gastroesophageal reflux disease) (7/25/2012), Gout, arthritis, Grade III open fracture of left tibia and fibula s/p ex-fix on 7/1/16 and ORIF of left tibia on 7/15 (7/6/2016), H/O: iritis, Helicobacter pylori (H. 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  The patient's chief complaint is an anterior left leg wound.  One month duration.  Secondary to mild trauma in the presence of edema.  Has attempted self treatment but wound has not healed.  Non painful. Denies nausea, vomiting, fever, chills. History of left foot and ankle ulcers.  This patient has documented high risk feet requiring routine maintenance secondary to peripheral vascular disease.    12/10/2019  Patient relates he has attempted to care for leg ulcer as instructed but wound dimensions have not improved.    12/19/2019 patient relates that he has kept dressing clean dry and intact without itching or discomfort.  He has begun taking antibiotics as prescribed without nausea, vomiting, diarrhea, fever, chills.  No new pedal complaints.    12/31/19: F/u left leg ulceration. Has been in unna boot without issue.     01/07/2020 Patient relates that he has kept dressing clean dry and intact without itching or discomfort. He relates increased callus to posterior heel.  He denies nausea, vomiting, diarrhea, fever, chills.  No new pedal complaints.    01/14/2020  Patient relates that he has kept dressing clean dry and intact without itching however he relates that dressing was slipping and therefore somewhat uncomfortable this week.   He denies nausea, vomiting, diarrhea, fever, chills.  No new pedal complaints.    01/21/2020 patient relates that he has kept dressing clean, dry, intact.  He has no pedal complaints.  He does eyes nausea, vomiting, fever, chills.  He is anxious to get out of Unna boot.    1/29/20: F/u left leg ulceration. Has been wearing tubi . Pt. Is concerned regarding wound appearance.     02/06/2020 patient relates that he has kept dressing clean, dry, intact.  He has no pedal complaints.  He does eyes nausea, vomiting, fever, chills.  He relates that he has a cardiovascular procedure scheduled next week.     02/13/2020 patient relates that he has kept dressing clean, dry,  intact.  He has no pedal complaints.  He denies nausea, vomiting, fever, chills.      02/24/2020 patient relates that he has kept dressing clean, dry, and intact.  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 He denies nausea, vomiting, fever, chills.      03/02/2020 patient relates that he has kept dressing clean, dry, and intact.   He denies nausea, vomiting, fever, chills.        03/16/2020 patient relates that he has kept dressing clean, dry, and intact.   He denies nausea, vomiting, fever, chills.      03/23/2020 patient relates that he was able to keep dressing clean, dry, intact until today.  Relates that his leg and foot got wet in the shower.  He states that he has been elevating the leg to that of his ability and taking his diuretics as prescribed however he still feels as if he is retaining fluid.  Patient denies nausea, vomiting, fever, chills.  No new pedal complaints.      PCP: Miguelina Weathers MD    Date Last Seen by PCP:   Chief Complaint   Patient presents with    Follow-up     right leg, ov Dr Weathers PCP 2/17/20     Current shoe gear: tennis shoe    Hemoglobin A1C   Date Value Ref Range Status   02/11/2020 5.6 4.0 - 5.6 % Final     Comment:     ADA Screening Guidelines:  5.7-6.4%  Consistent with prediabetes  >or=6.5%  Consistent with diabetes  High levels of fetal hemoglobin interfere with the HbA1C  assay. Heterozygous hemoglobin variants (HbS, HgC, etc)do  not significantly interfere with this assay.   However, presence of multiple variants may affect accuracy.     07/11/2016 5.3 4.5 - 6.2 % Final     Comment:     According to ADA guidelines, hemoglobin A1C <7.0% represents  optimal control in non-pregnant diabetic patients.  Different  metrics may apply to specific populations.   Standards of  Medical Care in Diabetes - 2016.  For the purpose of screening for the presence of diabetes:  <5.7%     Consistent with the absence of diabetes  5.7-6.4%  Consistent with increasing risk for diabetes   (prediabetes)  >or=6.5%  Consistent with diabetes  Currently no consensus exists for use of hemoglobin A1C  for diagnosis of diabetes for children.     03/31/2015 5.3 4.5 - 6.2 % Final         Current Outpatient Medications on File Prior to Visit   Medication Sig Dispense Refill    acyclovir (ZOVIRAX) 800 MG Tab Take 1 tablet (800 mg total) by mouth 2 (two) times daily. 180 tablet 6    albuterol (PROVENTIL/VENTOLIN HFA) 90 mcg/actuation inhaler Inhale 2 puffs into the lungs every 6 (six) hours as needed for Wheezing. Rescue 18 g 0    apixaban (ELIQUIS) 5 mg Tab Take 1 tablet (5 mg total) by mouth 2 (two) times daily. 180 tablet 4    artificial tears (ISOPTO TEARS) 0.5 % ophthalmic solution Place 1 drop into both eyes as needed. (Patient taking differently: Place 1 drop into both eyes as needed (for dry eyes). )      atorvastatin (LIPITOR) 40 MG tablet Take 1 tablet (40 mg total) by mouth once daily. Please make a follow up visit with your doctor for more refills of this medication 90 tablet 0    azelastine (ASTELIN) 137 mcg (0.1 %) nasal spray 1 spray (137 mcg total) by Nasal route 2 (two) times daily. 30 mL 2    clopidogreL (PLAVIX) 75 mg tablet Take 1 tablet (75 mg total) by mouth once daily. 90 tablet 3    CYANOCOBALAMIN, VITAMIN B-12, (VITAMIN B-12 ORAL) Take 2,500 mcg by mouth once daily.      fluticasone propionate (FLONASE) 50 mcg/actuation nasal spray 1 spray (50 mcg total) by Each Nostril route once daily. 1 Bottle 0    furosemide (LASIX) 20 MG tablet Take 1 tablet (20 mg total) by mouth once daily. 90 tablet 0    melatonin 5 mg Tab Take 10 mg by mouth nightly.      multivitamin (MULTIPLE VITAMINS) per tablet Take 1 tablet by mouth once daily.      nitroGLYCERIN (NITROSTAT) 0.4 MG SL tablet Place  1 tablet (0.4 mg total) under the tongue every 5 (five) minutes as needed for Chest pain. 25 tablet 11    olmesartan (BENICAR) 20 MG tablet Take 1 tablet (20 mg total) by mouth once daily. (replaces ramipril for blood pressure) 90 tablet 1    oxyCODONE-acetaminophen (PERCOCET) 5-325 mg per tablet Take 1 tablet by mouth every 4 to 6 hours as needed for Pain. 20 tablet 0    tamsulosin (FLOMAX) 0.4 mg Cap Take 1 capsule (0.4 mg total) by mouth once daily. 90 capsule 3    venlafaxine (EFFEXOR) 75 MG tablet Take 2 tablets by mouth twice daily 360 tablet 0    vitamin D 1000 units Tab Take 1 tablet (1,000 Units total) by mouth once daily.      gabapentin (NEURONTIN) 300 MG capsule Take 1 capsule (300 mg total) by mouth 2 (two) times daily. 60 capsule 11     No current facility-administered medications on file prior to visit.        Allergies   Allergen Reactions    Ciprofloxacin Rash     Diffuse pruritic morbilliform rash developed 3/15/2017 after dose of cipro; previously in 2/2017 he had rash/fevers after initiation of cipro    Zosyn [Piperacillin-Tazobactam] Anaphylaxis     Diffuse pruritic morbilliform rash developed 3/15/2017.  Then, 430am dose on 3/16 and rash worsened with SOB/tachypnea but no hypoxemia.     Bacitracin Itching and Rash     Violaceous rash in area of topical Tx.        Past Surgical History:   Procedure Laterality Date    Cardiac stenting x2      CATARACT EXTRACTION W/  INTRAOCULAR LENS IMPLANT Right 3/29/2016    Dr. Conteh    CATARACT EXTRACTION W/  INTRAOCULAR LENS IMPLANT Left 4/12/2016        EXTERNAL FIXATION TIBIAL FRACTURE Left 07/01/2016    INSERTION OF IMPLANTABLE LOOP RECORDER  04/07/2017    LEFT HEART CATHETERIZATION Left 1/30/2020    Procedure: Left heart cath;  Surgeon: Benjamin Sandhu MD;  Location: [facility] CATH LAB;  Service: Cardiology;  Laterality: Left;  RN PREOP 1/28/20--Pt starting Plavix loading dose today (8pills)- Dr. Sandhu aware.  Pt has a  "bandaged "non healing area to LLE"--Dr. Sandhu aware.    LEFT HEART CATHETERIZATION Left 2/14/2020    Procedure: Left heart cath, IVUS guided left main / LAD PCI. Noon start, radial approach;  Surgeon: Miguel Angel Brady MD;  Location: [facility] CATH LAB;  Service: Cardiology;  Laterality: Left;  RN PRE OP 2-7-2020  BMI--45.61    ORIF TIBIA FRACTURE Left 07/15/2016    RADIOACTIVE SEED IMPLANTATION OF PROSTATE N/A 8/8/2018    Procedure: INSERTION, RADIOACTIVE SEED, PROSTATE;  Surgeon: Bipin Thompson MD;  Location: [facility] OR [address] [place] [address];  Service: Urology;  Laterality: N/A;  1 hour    Squamous cell cancer removal x3 with Mohs surgery      TONSILLECTOMY      TOTAL KNEE ARTHROPLASTY  10/2012    trus/bx      ULTRASOUND GUIDANCE  2/14/2020    Procedure: Ultrasound Guidance;  Surgeon: Miguel Angel Brady MD;  Location: [facility] CATH LAB;  Service: Cardiology;;       Family History   Problem Relation Age of Onset    Skin cancer Father     Lung cancer Father     Cancer Father         smoker,     Alzheimer's disease Mother     Hypertension Mother     Cancer Sister         colon, lung cancer     No Known Problems Sister     Cancer Brother         skin cancer, polypectomy     Peripheral vascular disease Unknown     No Known Problems Maternal Aunt     No Known Problems Maternal Uncle     No Known Problems Paternal Aunt     No Known Problems Paternal Uncle     No Known Problems Maternal Grandmother     No Known Problems Maternal Grandfather     No Known Problems Paternal Grandmother     No Known Problems Paternal Grandfather     Melanoma Neg Hx     Psoriasis Neg Hx     Lupus Neg Hx     Eczema Neg Hx     Amblyopia Neg Hx     Blindness Neg Hx     Cataracts Neg Hx     Diabetes Neg Hx     Glaucoma Neg Hx     Macular degeneration Neg Hx     Retinal detachment Neg Hx     Strabismus Neg Hx     Stroke Neg Hx     Thyroid disease Neg Hx     Acne Neg Hx        Social History     Socioeconomic History    Marital status: "      Spouse name: Not on file    Number of children: Not on file    Years of education: Not on file    Highest education level: Not on file   Occupational History    Occupation: Retired    Social Needs    Financial resource strain: Not hard at all    Food insecurity:     Worry: Never true     Inability: Never true    Transportation needs:     Medical: No     Non-medical: No   Tobacco Use    Smoking status: Never Smoker    Smokeless tobacco: Never Used   Substance and Sexual Activity    Alcohol use: Yes     Frequency: 2-4 times a month     Drinks per session: 3 or 4     Binge frequency: Less than monthly     Comment: occasionally, beer    Drug use: Never    Sexual activity: Yes     Partners: Female   Lifestyle    Physical activity:     Days per week: Not on file     Minutes per session: Not on file    Stress: Not on file   Relationships    Social connections:     Talks on phone: Three times a week     Gets together: Once a week     Attends Buddhist service: More than 4 times per year     Active member of club or organization: Yes     Attends meetings of clubs or organizations: More than 4 times per year     Relationship status:    Other Topics Concern    Not on file   Social History Narrative    Not on file       Review of Systems   Constitution: Negative for chills and fever.   Cardiovascular: Negative for claudication and leg swelling.   Respiratory: Negative for cough and shortness of breath.    Skin: Positive for dry skin and poor wound healing. Negative for itching and rash.   Musculoskeletal: Positive for arthritis, joint pain, joint swelling and myalgias. Negative for falls and muscle weakness.   Gastrointestinal: Negative for diarrhea, nausea and vomiting.   Neurological: Positive for paresthesias. Negative for numbness, tremors and weakness.   Psychiatric/Behavioral: Negative for altered mental status and hallucinations.         Objective:       Vitals:    03/23/20 1647  "  Weight: (!) 153.3 kg (337 lb 15.4 oz)   Height: 6' 1" (1.854 m)   PainSc: 0-No pain       Physical Exam   Constitutional:  Non-toxic appearance. He does not have a sickly appearance. No distress.   Pt. is well-developed, well-nourished, appears stated age, in no acute distress, alert and oriented x 3. No evidence of depression, anxiety, or agitation. Calm, cooperative, and communicative. Appropriate interactions and affect.   Cardiovascular:   Pulses:       Dorsalis pedis pulses are 2+ on the right side, and 2+ on the left side.        Posterior tibial pulses are 1+ on the right side, and 1+ on the left side.   There is decreased digital hair. Skin is atrophic, slightly hyperpigmented, and pitting edema nora (L>>R)     Pulmonary/Chest: No respiratory distress.   Musculoskeletal:        Right ankle: He exhibits swelling. No tenderness. No lateral malleolus, no medial malleolus, no AITFL, no CF ligament, no head of 5th metatarsal and no proximal fibula tenderness found. Achilles tendon exhibits no pain, no defect and normal Zabala's test results.        Left ankle: He exhibits swelling. No tenderness. No lateral malleolus, no medial malleolus, no AITFL, no CF ligament and no posterior TFL tenderness found. Achilles tendon exhibits no pain, no defect and normal Zabala's test results.        Right foot: There is no tenderness and no bony tenderness.        Left foot: There is no tenderness and no bony tenderness.   Decreased stride, station of gait.  apropulsive toe off.  Increased angle and base of gait.    Patient has hammertoes of digits 2-5 bilateral partially reducible without symptom today.     There is equinus deformity bilateral. There is limitation of dorsiflexion with knees extended and with knees flexed.    Shoes reveals lateral heel counter wear bilateral in athletic shoes.        Lymphadenopathy:   No lymphatic streaking    Negative lymphadenopathy bilateral popliteal fossa and tarsal tunnel.   "   Neurological:   [place] 5.07 monofilament is intact bilateral feet. Sharp/dull sensation is also intact Bilateral feet. Proprioception is grossly intact. Vibratory sensation intact (pt able to sense vibration stop within 3-5 seconds)     Skin: Skin is warm and dry. Abrasion (left anterior leg as pictured, to level of subQ. local edema.  Granular base.  no fluctuance, no crepitus, no purulence), lesion and rash noted. No bruising, no burn and no laceration noted. He is not diaphoretic. No cyanosis or erythema. No pallor. Nails show no clubbing.   Scaling dryness in a moccasin distribution is noted to the bilateral lower extremities with associated erythema.          Ulcer location: posterior left heel  Signs of infection:none  Drainage:  None  Periwound: intact  Base: thin epithelial with bleeding within layers     Psychiatric: His mood appears not anxious. His affect is not inappropriate. His speech is not slurred. He is not combative. He is communicative. He is attentive.   Nursing note reviewed.    03/23/2020 03/16/2020 03/09/2020 03/02/2020 02/24/20 02/13/2020 02/06/20 1/31/20 01/21/20 01/14/20 01/07/20 12/31/19: Epic unable to load pic.     12/19/2019          12/10/19          11/14/19          Assessment:       Encounter Diagnoses   Name Primary?    PVD (peripheral vascular disease) Yes    Venous stasis ulcer of left lower leg with edema of left lower leg     Tinea pedis of left foot          Plan:       Janusz was seen today for follow-up.    Diagnoses and all orders for this visit:    PVD (peripheral vascular disease)    Venous stasis ulcer of left lower leg with edema of left lower leg    Tinea pedis of left foot      I counseled the patient on his conditions, their implications and medical management.    The site is cleansed of foreign material as much as possible. The patient  is alerted to watch for any signs of infection (redness, pus, pain, increased swelling or fever) and call if such occurs.    Wound slightly improved, without signs of infection.  May biopsy on next encounter    Fabienne, Mepilex and TubiGrip applied    Detailed home care instructions dispensed.     Patient is to elevate legs. When sleeping, place a pillow under lower extremities. When sitting, support the legs so that they are level with the waist.      Instructed patient on the importance of keeping feet dry. Patient instructed to use absorbent cotton socks and change them if they become sweaty; or wear an open-toe shoe or sandal. Wash the feet at least once a day with soap and water. Apply the antifungal gel as prescribed. Instructed patient that it takes time for symptoms to completely dissipate. Patient instructed to use lysol or over-the-counter antifungal powders or sprays to shoes daily and allow them to air dry, switching shoes from every other day would be optimal. Patient is to avoid barefoot walking in  high-risk environments (public showers, gyms and locker rooms) may prevent future infections.     Patient to RTC if:  Increasing redness or swelling of the foot   Pus draining from cracks in the skin   Fever of 100.4ºF (38ºC) or higher    Follow-up:Patient is to return to the clinic in 1-2 weeks for follow-up but should call [facility] immediately if any signs of infection, such as fever, chills, sweats, increased redness or pain.    Short-term goals include maintaining good offloading and minimizing bioburden, promoting granulation and epithelialization to healing.  Long-term goals include keeping the wound healed by good offloading and medical management under the direction of internist.

## 2020-03-23 NOTE — PATIENT INSTRUCTIONS
Wound care Instructions:   · Once a day beginning in 3 days:   ¨ Clean the left leg and foot separate from your body with warm running water and antibacterial soap such as dial   ¨ Clean any remaining crust away with soap and water using a cotton-tipped applicator.  ¨ DRY COMPLETELY  ¨ Apply Fabienne preferably or Betadine to the wound site.  ¨ Cover with a breathable bandage until there is no more drainage or open flesh.  ¨ Apply antifungal cream to feet  ¨ Use compression sock or stocking  ¨ Elevate the leg  · Change the dressing daily, or whenever it becomes wet or dirty.  · If you were prescribed antibiotics, take them as directed until they are all gone.  · Wear comfortable shoes with a lot of toe room. You may use acetaminophen or ibuprofen to control pain, unless another medicine was prescribed. If you have chronic liver or kidney disease or ever had a stomach ulcer or GI bleeding, talk with your doctor before using these medicines.      When to seek medical advice  Call your health care provider right away if any of the following occur:  · Increasing redness, pain or swelling of the toe  · Red streaks in the skin leading away from the wound  · Continued pus or fluid drainage for more than 24 hours  · Fever of 100.4º F (38º C) or higher, or as directed by your health care provider          Recommend lotions: eucerin, eucerin for diabetics, aquaphor, A&D ointment, gold bond for diabetics, sween, [place]'s Bees all purpose baby ointment,  urea 40 with aloe (found on amazon.com)    Shoe recommendations: (try 6pm.BrightLocker, zappos.BrightLocker , Vouchercloud, or shoes.BrightLocker for discounted prices) you can visit DSW shoes in [place]  or Striped Sail [facility] in the [facility] (there are also several shoe brand outlets in the [facility])    Asics (GT 2000 or gel foundations), new balance stability type shoes (such as the 940 series), saucony (stabil c3),  Knight (GTS or Beast or transcend), propet (tennis shoe)    Sofft Brand (women)  Joaquin&Ike (men), clarks, crocs, aerosoles, naturalizers, SAS, ecco, born, sindy doan, rockports (dress shoes)    Vionic, burkenstocks, fitflops, propet (sandals)  Nike comfort thong sandals, crocs, propet (house shoes)    Nail Home remedy:  Vicks Vapor rub to nails for easier manageability    Athletes Foot     Athletes Foot is caused by a fungal infection in the skin. It affects the skin between the toes where it causes fissures (cracks in the skin). It can also affect the bottom of the foot where it causes dry white scales and peeling of the skin. This infection is more likely to occur when the foot is in hot, sweaty socks and shoes for long periods of time.   This infection is treated with skin creams or oral medicine.     Home Care:   It is important to keep the feet dry. Use absorbent cotton socks and change them if they become sweaty; or wear an open-toe shoe or sandal. Wash the feet at least once a day with soap and water.   Rotate your shoes. If you must wear the same shoes everyday then spray the shoes with lysol or antifungal spray and allow that to dry overnight before wearing the shoes again  Apply the antifungal cream as prescribed. Some antifungal creams are available without a prescription (Lotrimin, Tinactin).   It may take a week before the rash starts to improve and it can take about three to four weeks to completely clear. Continue the medicine until the rash is all gone.   Use over-the-counter antifungal powders or sprays on your feet after exposure to high-risk environments (public showers, gyms and locker rooms) may prevent future infections. You may wish to use appropriate footwear to reduce exposure.  Clean tubs and bathroom floor with bleach  Clean feet with Nizoral shampoo or dial antibacterial soap and then dry completely.    Follow Up   with your doctor as recommended by our staff if the rash is not starting to improve after TEN days of treatment, or if the rash continues to spread.      Get Prompt Medical Attention   if any of the following occur:   Increasing redness or swelling of the foot   Pus draining from cracks in the skin   Fever of 100.4ºF (38ºC) or higher, or as directed by your healthcare provider    © 1504-1027 Disha Babin, [address] [place] [address], [place] [address]. All rights reserved. This information is not intended as a substitute for professional medical care. Always follow your healthcare professional's instructions.

## 2020-03-30 ENCOUNTER — PATIENT MESSAGE (OUTPATIENT)
Dept: CARDIOLOGY | Facility: CLINIC | Age: 75
End: 2020-03-30

## 2020-03-30 ENCOUNTER — PATIENT MESSAGE (OUTPATIENT)
Dept: UROLOGY | Facility: CLINIC | Age: 75
End: 2020-03-30

## 2020-03-30 DIAGNOSIS — I73.9 PVD (PERIPHERAL VASCULAR DISEASE): Primary | ICD-10-CM

## 2020-03-30 DIAGNOSIS — I65.23 BILATERAL CAROTID ARTERY STENOSIS: ICD-10-CM

## 2020-03-31 ENCOUNTER — LAB VISIT (OUTPATIENT)
Dept: LAB | Facility: HOSPITAL | Age: 75
End: 2020-03-31
Attending: NURSE PRACTITIONER
Payer: MEDICARE

## 2020-03-31 DIAGNOSIS — C61 PROSTATE CANCER: ICD-10-CM

## 2020-03-31 LAB — COMPLEXED PSA SERPL-MCNC: <0.01 NG/ML (ref 0–4)

## 2020-03-31 PROCEDURE — 36415 COLL VENOUS BLD VENIPUNCTURE: CPT | Mod: HCNC,PO

## 2020-03-31 PROCEDURE — 84153 ASSAY OF PSA TOTAL: CPT | Mod: HCNC

## 2020-04-04 NOTE — TELEPHONE ENCOUNTER
Called pt's wife to confirm 11:30am arrival time for procedure. Gave pt's wife NPO instructions and gave pt's wife opportunity to ask questions. Pt's wife verbalized understanding.     IMPRESSION:  HIV patient with severe COVID-19 infection - clinically improving    Recommend:  1.  Continue outpatient ART  2.  Continue Bactrim 1 DS PO daily.  He should be discharged with this.  He can follow up with his PCP to have CD4 count checked  3.  Finish 5 day course of plaquenil and azithromycin  4.  Can complete 7 day course of systemic antibiotics    ID team 1 will sign off.  Reconsult as needed

## 2020-04-08 ENCOUNTER — CLINICAL SUPPORT (OUTPATIENT)
Dept: CARDIOLOGY | Facility: HOSPITAL | Age: 75
End: 2020-04-08
Attending: INTERNAL MEDICINE
Payer: MEDICARE

## 2020-04-08 DIAGNOSIS — Z46.9 FITTING AND ADJUSTMENT OF DEVICE: ICD-10-CM

## 2020-04-08 PROCEDURE — 93298 CARDIAC DEVICE CHECK - REMOTE: ICD-10-PCS | Mod: HCNC,,, | Performed by: INTERNAL MEDICINE

## 2020-04-08 PROCEDURE — 93298 REM INTERROG DEV EVAL SCRMS: CPT | Mod: HCNC,,, | Performed by: INTERNAL MEDICINE

## 2020-04-08 PROCEDURE — G2066 INTER DEVC REMOTE 30D: HCPCS | Mod: HCNC | Performed by: INTERNAL MEDICINE

## 2020-04-09 ENCOUNTER — PATIENT OUTREACH (OUTPATIENT)
Dept: ADMINISTRATIVE | Facility: OTHER | Age: 75
End: 2020-04-09

## 2020-04-13 ENCOUNTER — OFFICE VISIT (OUTPATIENT)
Dept: PODIATRY | Facility: CLINIC | Age: 75
End: 2020-04-13
Payer: MEDICARE

## 2020-04-13 VITALS — WEIGHT: 315 LBS | BODY MASS INDEX: 41.75 KG/M2 | HEIGHT: 73 IN

## 2020-04-13 DIAGNOSIS — I83.029 VENOUS STASIS ULCER OF LEFT LOWER LEG WITH EDEMA OF LEFT LOWER LEG: ICD-10-CM

## 2020-04-13 DIAGNOSIS — I73.9 PVD (PERIPHERAL VASCULAR DISEASE): Primary | ICD-10-CM

## 2020-04-13 DIAGNOSIS — I83.892 VENOUS STASIS ULCER OF LEFT LOWER LEG WITH EDEMA OF LEFT LOWER LEG: ICD-10-CM

## 2020-04-13 DIAGNOSIS — L90.9 PLANTAR FAT PAD ATROPHY: ICD-10-CM

## 2020-04-13 DIAGNOSIS — L97.929 VENOUS STASIS ULCER OF LEFT LOWER LEG WITH EDEMA OF LEFT LOWER LEG: ICD-10-CM

## 2020-04-13 DIAGNOSIS — I87.8 VENOUS STASIS OF LOWER EXTREMITY: ICD-10-CM

## 2020-04-13 DIAGNOSIS — R60.0 VENOUS STASIS ULCER OF LEFT LOWER LEG WITH EDEMA OF LEFT LOWER LEG: ICD-10-CM

## 2020-04-13 DIAGNOSIS — B35.3 TINEA PEDIS OF LEFT FOOT: ICD-10-CM

## 2020-04-13 PROCEDURE — 1159F MED LIST DOCD IN RCRD: CPT | Mod: HCNC,S$GLB,, | Performed by: PODIATRIST

## 2020-04-13 PROCEDURE — 99214 PR OFFICE/OUTPT VISIT, EST, LEVL IV, 30-39 MIN: ICD-10-PCS | Mod: HCNC,S$GLB,, | Performed by: PODIATRIST

## 2020-04-13 PROCEDURE — 1101F PR PT FALLS ASSESS DOC 0-1 FALLS W/OUT INJ PAST YR: ICD-10-PCS | Mod: HCNC,CPTII,S$GLB, | Performed by: PODIATRIST

## 2020-04-13 PROCEDURE — 1101F PT FALLS ASSESS-DOCD LE1/YR: CPT | Mod: HCNC,CPTII,S$GLB, | Performed by: PODIATRIST

## 2020-04-13 PROCEDURE — 87077 CULTURE AEROBIC IDENTIFY: CPT | Mod: HCNC

## 2020-04-13 PROCEDURE — 87070 CULTURE OTHR SPECIMN AEROBIC: CPT | Mod: HCNC

## 2020-04-13 PROCEDURE — 99999 PR PBB SHADOW E&M-EST. PATIENT-LVL III: ICD-10-PCS | Mod: PBBFAC,HCNC,, | Performed by: PODIATRIST

## 2020-04-13 PROCEDURE — 99214 OFFICE O/P EST MOD 30 MIN: CPT | Mod: HCNC,S$GLB,, | Performed by: PODIATRIST

## 2020-04-13 PROCEDURE — 1159F PR MEDICATION LIST DOCUMENTED IN MEDICAL RECORD: ICD-10-PCS | Mod: HCNC,S$GLB,, | Performed by: PODIATRIST

## 2020-04-13 PROCEDURE — 87075 CULTR BACTERIA EXCEPT BLOOD: CPT | Mod: HCNC

## 2020-04-13 PROCEDURE — 1125F PR PAIN SEVERITY QUANTIFIED, PAIN PRESENT: ICD-10-PCS | Mod: HCNC,S$GLB,, | Performed by: PODIATRIST

## 2020-04-13 PROCEDURE — 99999 PR PBB SHADOW E&M-EST. PATIENT-LVL III: CPT | Mod: PBBFAC,HCNC,, | Performed by: PODIATRIST

## 2020-04-13 PROCEDURE — 87186 SC STD MICRODIL/AGAR DIL: CPT | Mod: HCNC

## 2020-04-13 PROCEDURE — 1125F AMNT PAIN NOTED PAIN PRSNT: CPT | Mod: HCNC,S$GLB,, | Performed by: PODIATRIST

## 2020-04-13 RX ORDER — DOXYCYCLINE HYCLATE 100 MG
100 TABLET ORAL 2 TIMES DAILY
Qty: 20 TABLET | Refills: 0 | Status: SHIPPED | OUTPATIENT
Start: 2020-04-13 | End: 2020-08-17

## 2020-04-13 NOTE — PROGRESS NOTES
Subjective:      Patient ID: Janusz Montgomery Jr. is a 75 y.o. male.    Chief Complaint: Wound Care (left leg Pcp Dr. Weathers 2/17/2020) and Wound Check      Janusz Montgomery Jr. is a 75 y.o. malewho presents to the clinic for evaluation and treatment of high risk feet. Janusz Montgomery Jr.   has a past medical history of NAT (acute kidney injury) (3/19/2017), ALLERGIC RHINITIS, Anemia, Anxiety, Basal cell carcinoma (10/19/2018), Basal cell carcinoma (01/09/2019), Chronic rhinitis (5/3/2013), Chronic rhinitis (5/3/2013), Coronary artery disease involving native coronary artery of native heart without angina pectoris s/p RCA stent, Cortical cataract of both eyes (3/18/2016), Delayed sleep phase syndrome (3/13/2019), Depression, Erectile dysfunction (3/24/2014), Erectile dysfunction (3/24/2014), Essential hypertension, GERD (gastroesophageal reflux disease) (7/25/2012), Gout, arthritis, Grade III open fracture of left tibia and fibula s/p ex-fix on 7/1/16 and ORIF of left tibia on 7/15 (7/6/2016), H/O: iritis, Helicobacter pylori (H. 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  The patient's chief complaint is an anterior left leg wound.  One month duration.  Secondary to mild trauma in the presence of edema.  Has attempted self treatment but wound has not healed.  Non painful. Denies nausea, vomiting, fever, chills. History of left foot and ankle ulcers.  This patient has documented high risk feet requiring routine maintenance secondary to peripheral vascular disease.    12/10/2019  Patient relates he has attempted to care for leg ulcer as instructed but wound dimensions have not improved.    12/19/2019 patient relates that he has kept dressing clean dry and intact without itching or discomfort.  He has begun taking antibiotics as prescribed without nausea, vomiting, diarrhea, fever, chills.  No new pedal complaints.    12/31/19: F/u left leg ulceration. Has been in unna boot without issue.     01/07/2020 Patient relates that he has kept dressing clean dry and intact without itching or discomfort. He relates increased callus to posterior heel.  He denies nausea, vomiting, diarrhea, fever, chills.  No new pedal complaints.    01/14/2020  Patient relates that he has kept dressing clean dry and intact without itching however he relates that dressing was slipping and therefore somewhat uncomfortable this week.   He denies nausea, vomiting, diarrhea, fever, chills.  No new pedal complaints.    01/21/2020 patient relates that he has kept dressing clean, dry, intact.  He has no pedal complaints.  He does eyes nausea, vomiting, fever, chills.  He is anxious to get out of Unna boot.    1/29/20: F/u left leg ulceration. Has been wearing tubi . Pt. Is concerned regarding wound appearance.     02/06/2020 patient relates that he has kept dressing clean, dry, intact.  He has no pedal complaints.  He does eyes nausea, vomiting, fever, chills.  He relates that he has a cardiovascular procedure scheduled next week.     02/13/2020 patient relates that he has kept dressing  clean, dry, intact.  He has no pedal complaints.  He denies nausea, vomiting, fever, chills.      02/24/2020 patient relates that he has kept dressing clean, dry, and intact.  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 He denies nausea, vomiting, fever, chills.      03/02/2020 patient relates that he has kept dressing clean, dry, and intact.   He denies nausea, vomiting, fever, chills.        03/16/2020 patient relates that he has kept dressing clean, dry, and intact.   He denies nausea, vomiting, fever, chills.      03/23/2020 patient relates that he was able to keep dressing clean, dry, intact until today.  Relates that his leg and foot got wet in the shower.  He states that he has been elevating the leg to that of his ability and taking his diuretics as prescribed however he still feels as if he is retaining fluid.  Patient denies nausea, vomiting, fever, chills.  No new pedal complaints.     04/13/2020 for the last 2 weeks patient has been caring for the wound at home with supplies that he has from previous wounds.  He has been using a Tubigrip for compression.  He relates that for the last several days he has had increase in itching and increased redness to the left leg.  Patient denies nausea, vomiting, fever, chills.    PCP: Miguelina Weathers MD    Date Last Seen by PCP:   Chief Complaint   Patient presents with    Wound Care     left leg Pcp Dr. Weathers 2/17/2020    Wound Check     Current shoe gear: tennis shoe    Hemoglobin A1C   Date Value Ref Range Status   02/11/2020 5.6 4.0 - 5.6 % Final     Comment:     ADA Screening Guidelines:  5.7-6.4%  Consistent with prediabetes  >or=6.5%  Consistent with diabetes  High levels of fetal hemoglobin interfere with the HbA1C  assay. Heterozygous hemoglobin variants  (HbS, HgC, etc)do  not significantly interfere with this assay.   However, presence of multiple variants may affect accuracy.     07/11/2016 5.3 4.5 - 6.2 % Final     Comment:     According to ADA guidelines, hemoglobin A1C <7.0% represents  optimal control in non-pregnant diabetic patients.  Different  metrics may apply to specific populations.   Standards of Medical Care in Diabetes - 2016.  For the purpose of screening for the presence of diabetes:  <5.7%     Consistent with the absence of diabetes  5.7-6.4%  Consistent with increasing risk for diabetes   (prediabetes)  >or=6.5%  Consistent with diabetes  Currently no consensus exists for use of hemoglobin A1C  for diagnosis of diabetes for children.     03/31/2015 5.3 4.5 - 6.2 % Final         Current Outpatient Medications on File Prior to Visit   Medication Sig Dispense Refill    acyclovir (ZOVIRAX) 800 MG Tab Take 1 tablet (800 mg total) by mouth 2 (two) times daily. 180 tablet 6    albuterol (PROVENTIL/VENTOLIN HFA) 90 mcg/actuation inhaler Inhale 2 puffs into the lungs every 6 (six) hours as needed for Wheezing. Rescue 18 g 0    apixaban (ELIQUIS) 5 mg Tab Take 1 tablet (5 mg total) by mouth 2 (two) times daily. 180 tablet 4    artificial tears (ISOPTO TEARS) 0.5 % ophthalmic solution Place 1 drop into both eyes as needed. (Patient taking differently: Place 1 drop into both eyes as needed (for dry eyes). )      atorvastatin (LIPITOR) 40 MG tablet Take 1 tablet (40 mg total) by mouth once daily. Please make a follow up visit with your doctor for more refills of this medication 90 tablet 0    azelastine (ASTELIN) 137 mcg (0.1 %) nasal spray 1 spray (137 mcg total) by Nasal route 2 (two) times daily. 30 mL 2    clopidogreL (PLAVIX) 75 mg tablet Take 1 tablet (75 mg total) by mouth once daily. 90 tablet 3    CYANOCOBALAMIN, VITAMIN B-12, (VITAMIN B-12 ORAL) Take 2,500 mcg by mouth once daily.      fluticasone propionate (FLONASE) 50 mcg/actuation nasal  spray 1 spray (50 mcg total) by Each Nostril route once daily. 1 Bottle 0    furosemide (LASIX) 20 MG tablet Take 1 tablet (20 mg total) by mouth once daily. 90 tablet 0    melatonin 5 mg Tab Take 10 mg by mouth nightly.      multivitamin (MULTIPLE VITAMINS) per tablet Take 1 tablet by mouth once daily.      nitroGLYCERIN (NITROSTAT) 0.4 MG SL tablet Place 1 tablet (0.4 mg total) under the tongue every 5 (five) minutes as needed for Chest pain. 25 tablet 11    olmesartan (BENICAR) 20 MG tablet Take 1 tablet (20 mg total) by mouth once daily. (replaces ramipril for blood pressure) 90 tablet 1    oxyCODONE-acetaminophen (PERCOCET) 5-325 mg per tablet Take 1 tablet by mouth every 4 to 6 hours as needed for Pain. 20 tablet 0    tamsulosin (FLOMAX) 0.4 mg Cap Take 1 capsule (0.4 mg total) by mouth once daily. 90 capsule 3    venlafaxine (EFFEXOR) 75 MG tablet Take 2 tablets by mouth twice daily 360 tablet 0    vitamin D 1000 units Tab Take 1 tablet (1,000 Units total) by mouth once daily.      gabapentin (NEURONTIN) 300 MG capsule Take 1 capsule (300 mg total) by mouth 2 (two) times daily. 60 capsule 11     No current facility-administered medications on file prior to visit.        Allergies   Allergen Reactions    Ciprofloxacin Rash     Diffuse pruritic morbilliform rash developed 3/15/2017 after dose of cipro; previously in 2/2017 he had rash/fevers after initiation of cipro    Zosyn [Piperacillin-Tazobactam] Anaphylaxis     Diffuse pruritic morbilliform rash developed 3/15/2017.  Then, 430am dose on 3/16 and rash worsened with SOB/tachypnea but no hypoxemia.     Bacitracin Itching and Rash     Violaceous rash in area of topical Tx.        Past Surgical History:   Procedure Laterality Date    Cardiac stenting x2      CATARACT EXTRACTION W/  INTRAOCULAR LENS IMPLANT Right 3/29/2016    Dr. Conteh    CATARACT EXTRACTION W/  INTRAOCULAR LENS IMPLANT Left 4/12/2016        EXTERNAL FIXATION  "TIBIAL FRACTURE Left 07/01/2016    INSERTION OF IMPLANTABLE LOOP RECORDER  04/07/2017    LEFT HEART CATHETERIZATION Left 1/30/2020    Procedure: Left heart cath;  Surgeon: Benjamin Sandhu MD;  Location: [facility] CATH LAB;  Service: Cardiology;  Laterality: Left;  RN PREOP 1/28/20--Pt starting Plavix loading dose today (8pills)- Dr. Sandhu aware.  Pt has a bandaged "non healing area to LLE"--Dr. Sandhu aware.    LEFT HEART CATHETERIZATION Left 2/14/2020    Procedure: Left heart cath, IVUS guided left main / LAD PCI. Noon start, radial approach;  Surgeon: Miguel Angel Brady MD;  Location: [facility] CATH LAB;  Service: Cardiology;  Laterality: Left;  RN PRE OP 2-7-2020  BMI--45.61    ORIF TIBIA FRACTURE Left 07/15/2016    RADIOACTIVE SEED IMPLANTATION OF PROSTATE N/A 8/8/2018    Procedure: INSERTION, RADIOACTIVE SEED, PROSTATE;  Surgeon: Bipin Thompson MD;  Location: [address];  Service: Urology;  Laterality: N/A;  1 hour    Squamous cell cancer removal x3 with Mohs surgery      TONSILLECTOMY      TOTAL KNEE ARTHROPLASTY  10/2012    trus/bx      ULTRASOUND GUIDANCE  2/14/2020    Procedure: Ultrasound Guidance;  Surgeon: Miguel Angel Brady MD;  Location: [facility] CATH LAB;  Service: Cardiology;;       Family History   Problem Relation Age of Onset    Skin cancer Father     Lung cancer Father     Cancer Father         smoker,     Alzheimer's disease Mother     Hypertension Mother     Cancer Sister         colon, lung cancer     No Known Problems Sister     Cancer Brother         skin cancer, polypectomy     Peripheral vascular disease Unknown     No Known Problems Maternal Aunt     No Known Problems Maternal Uncle     No Known Problems Paternal Aunt     No Known Problems Paternal Uncle     No Known Problems Maternal Grandmother     No Known Problems Maternal Grandfather     No Known Problems Paternal Grandmother     No Known Problems Paternal Grandfather     Melanoma Neg Hx     Psoriasis Neg Hx     " Lupus Neg Hx     Eczema Neg Hx     Amblyopia Neg Hx     Blindness Neg Hx     Cataracts Neg Hx     Diabetes Neg Hx     Glaucoma Neg Hx     Macular degeneration Neg Hx     Retinal detachment Neg Hx     Strabismus Neg Hx     Stroke Neg Hx     Thyroid disease Neg Hx     Acne Neg Hx        Social History     Socioeconomic History    Marital status:      Spouse name: Not on file    Number of children: Not on file    Years of education: Not on file    Highest education level: Not on file   Occupational History    Occupation: Retired    Social Needs    Financial resource strain: Not hard at all    Food insecurity:     Worry: Never true     Inability: Never true    Transportation needs:     Medical: No     Non-medical: No   Tobacco Use    Smoking status: Never Smoker    Smokeless tobacco: Never Used   Substance and Sexual Activity    Alcohol use: Yes     Frequency: 2-4 times a month     Drinks per session: 3 or 4     Binge frequency: Less than monthly     Comment: occasionally, beer    Drug use: Never    Sexual activity: Yes     Partners: Female   Lifestyle    Physical activity:     Days per week: Not on file     Minutes per session: Not on file    Stress: Not on file   Relationships    Social connections:     Talks on phone: Three times a week     Gets together: Once a week     Attends Congregational service: More than 4 times per year     Active member of club or organization: Yes     Attends meetings of clubs or organizations: More than 4 times per year     Relationship status:    Other Topics Concern    Not on file   Social History Narrative    Not on file       Review of Systems   Constitution: Negative for chills and fever.   Cardiovascular: Negative for claudication and leg swelling.   Respiratory: Negative for cough and shortness of breath.    Skin: Positive for dry skin, itching (Left leg) and poor wound healing. Negative for rash.   Musculoskeletal: Positive for arthritis,  "joint pain, joint swelling and myalgias. Negative for falls and muscle weakness.   Gastrointestinal: Negative for diarrhea, nausea and vomiting.   Neurological: Positive for paresthesias. Negative for numbness, tremors and weakness.   Psychiatric/Behavioral: Negative for altered mental status and hallucinations.         Objective:       Vitals:    04/13/20 1557   Weight: (!) 152.9 kg (337 lb)   Height: 6' 1" (1.854 m)   PainSc:   5   PainLoc: Ankle       Physical Exam   Constitutional:  Non-toxic appearance. He does not have a sickly appearance. No distress.   Pt. is well-developed, well-nourished, appears stated age, in no acute distress, alert and oriented x 3. No evidence of depression, anxiety, or agitation. Calm, cooperative, and communicative. Appropriate interactions and affect.   Cardiovascular:   Pulses:       Dorsalis pedis pulses are 2+ on the right side, and 2+ on the left side.        Posterior tibial pulses are 1+ on the right side, and 1+ on the left side.   There is decreased digital hair. Skin is atrophic, slightly hyperpigmented, and pitting edema nora (L>>R)     Pulmonary/Chest: No respiratory distress.   Musculoskeletal:        Right ankle: He exhibits swelling. No tenderness. No lateral malleolus, no medial malleolus, no AITFL, no CF ligament, no head of 5th metatarsal and no proximal fibula tenderness found. Achilles tendon exhibits no pain, no defect and normal Zabala's test results.        Left ankle: He exhibits swelling. No tenderness. No lateral malleolus, no medial malleolus, no AITFL, no CF ligament and no posterior TFL tenderness found. Achilles tendon exhibits no pain, no defect and normal Zabala's test results.        Right foot: There is no tenderness and no bony tenderness.        Left foot: There is no tenderness and no bony tenderness.   Decreased stride, station of gait.  apropulsive toe off.  Increased angle and base of gait.    Patient has hammertoes of digits 2-5 bilateral " partially reducible without symptom today.     There is equinus deformity bilateral. There is limitation of dorsiflexion with knees extended and with knees flexed.    Shoes reveals lateral heel counter wear bilateral in athletic shoes.        Lymphadenopathy:   No lymphatic streaking    Negative lymphadenopathy bilateral popliteal fossa and tarsal tunnel.     Neurological:   [place]-Jenn 5.07 monofilament is intact bilateral feet. Sharp/dull sensation is also intact Bilateral feet. Proprioception is grossly intact. Vibratory sensation intact (pt able to sense vibration stop within 3-5 seconds)     Skin: Skin is warm and dry. Abrasion (Diffuse to left leg secondary to scratching), lesion and rash noted. No bruising, no burn and no laceration noted. He is not diaphoretic. There is erythema (Circumference of left lower leg). No cyanosis. No pallor. Nails show no clubbing.   Redness and weeping noted to the left lower leg    Ulcer location: posterior left heel  Signs of infection:none  Drainage:  None  Periwound: intact  Base: thin epithelial with bleeding within layers    Ulcer location: left anterior leg as pictured  Signs of infection:  Local edema and erythema  Drainage: Sero-Sanguinous  Purulence: no  Crepitus/fluctuance: no  Periwound: Reddened  Base: Granular/Healthy  Depth: subcutaneous tissue  Probe to bone: no   Psychiatric: His mood appears not anxious. His affect is not inappropriate. His speech is not slurred. He is not combative. He is communicative. He is attentive.   Nursing note reviewed.     04/13/2020 03/23/2020 03/16/2020 03/09/2020 03/02/2020 02/24/20 02/13/2020 02/06/20 1/31/20 01/21/20 01/14/20 01/07/20 12/31/19: Epic unable to load pic.     12/19/2019          12/10/19          11/14/19          Assessment:       Encounter Diagnoses   Name Primary?    PVD  (peripheral vascular disease) Yes    Venous stasis ulcer of left lower leg with edema of left lower leg     Tinea pedis of left foot     Venous stasis of lower extremity     Plantar fat pad atrophy          Plan:       Janusz was seen today for wound care and wound check.    Diagnoses and all orders for this visit:    PVD (peripheral vascular disease)  -     Aerobic culture  -     Culture, Anaerobic  -     Ambulatory referral/consult to Home Health; Future    Venous stasis ulcer of left lower leg with edema of left lower leg  -     Aerobic culture  -     Culture, Anaerobic  -     Ambulatory referral/consult to Home Health; Future    Tinea pedis of left foot  -     Aerobic culture  -     Culture, Anaerobic  -     Ambulatory referral/consult to Home Health; Future    Venous stasis of lower extremity    Plantar fat pad atrophy    Other orders  -     doxycycline (VIBRA-TABS) 100 MG tablet; Take 1 tablet (100 mg total) by mouth 2 (two) times daily.      I counseled the patient on his conditions, their implications and medical management.    The site is cleansed of foreign material as much as possible. The patient is alerted to watch for any signs of infection (redness, pus, pain, increased swelling or fever) and call if such occurs.     wound has regressed from previous encounter, with increased localized edema and erythema.  Weeping noted to the leg consistent with venous stasis,  There are also some signs of tinea.     Again discussed  Biopsy of the wound, patient relates that he would like me to discuss this wound with Dermatology prior to any biopsy or have him scheduled to see his dermatologist as soon as possible.  Message in his sent to his dermatologist via [facility].      aerobic and anaerobic culture swabs taken.  Doxycycline prescribed prophylactically until culture results can be interpreted.    Calmoseptine applied to lower leg.   Fabienne applied to wound bed.   Calamine Coflex for compression to the  leg    Home health referral placed for twice weekly dressing changes, given the increased swelling and weeping he needs more consistent compression    Patient is to elevate legs. When sleeping, place a pillow under lower extremities. When sitting, support the legs so that they are level with the waist.      Follow-up:Patient is to return to the clinic in 2-4 weeks for follow-up but should call [facility] immediately if any signs of infection, such as fever, chills, sweats, increased redness or pain.    Short-term goals include maintaining good offloading and minimizing bioburden, promoting granulation and epithelialization to healing.  Long-term goals include keeping the wound healed by good offloading and medical management under the direction of internist.

## 2020-04-14 ENCOUNTER — OFFICE VISIT (OUTPATIENT)
Dept: UROLOGY | Facility: CLINIC | Age: 75
End: 2020-04-14
Payer: MEDICARE

## 2020-04-14 DIAGNOSIS — R39.15 URINARY URGENCY: ICD-10-CM

## 2020-04-14 DIAGNOSIS — C61 PROSTATE CANCER: Primary | ICD-10-CM

## 2020-04-14 DIAGNOSIS — R35.1 NOCTURIA MORE THAN TWICE PER NIGHT: ICD-10-CM

## 2020-04-14 DIAGNOSIS — N52.9 ED (ERECTILE DYSFUNCTION) OF ORGANIC ORIGIN: ICD-10-CM

## 2020-04-14 PROCEDURE — 99212 PR OFFICE/OUTPT VISIT, EST, LEVL II, 10-19 MIN: ICD-10-PCS | Mod: HCNC,95,, | Performed by: UROLOGY

## 2020-04-14 PROCEDURE — 1159F MED LIST DOCD IN RCRD: CPT | Mod: HCNC,95,, | Performed by: UROLOGY

## 2020-04-14 PROCEDURE — 1101F PT FALLS ASSESS-DOCD LE1/YR: CPT | Mod: HCNC,CPTII,95, | Performed by: UROLOGY

## 2020-04-14 PROCEDURE — 99499 UNLISTED E&M SERVICE: CPT | Mod: HCNC,95,, | Performed by: UROLOGY

## 2020-04-14 PROCEDURE — 99499 RISK ADDL DX/OHS AUDIT: ICD-10-PCS | Mod: HCNC,95,, | Performed by: UROLOGY

## 2020-04-14 PROCEDURE — 1159F PR MEDICATION LIST DOCUMENTED IN MEDICAL RECORD: ICD-10-PCS | Mod: HCNC,95,, | Performed by: UROLOGY

## 2020-04-14 PROCEDURE — 1101F PR PT FALLS ASSESS DOC 0-1 FALLS W/OUT INJ PAST YR: ICD-10-PCS | Mod: HCNC,CPTII,95, | Performed by: UROLOGY

## 2020-04-14 PROCEDURE — 99212 OFFICE O/P EST SF 10 MIN: CPT | Mod: HCNC,95,, | Performed by: UROLOGY

## 2020-04-14 RX ORDER — OXYBUTYNIN CHLORIDE 5 MG/1
5 TABLET, EXTENDED RELEASE ORAL DAILY
Qty: 30 TABLET | Refills: 11 | Status: SHIPPED | OUTPATIENT
Start: 2020-04-14 | End: 2022-01-01 | Stop reason: SDUPTHER
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
## 2020-04-15 ENCOUNTER — PATIENT MESSAGE (OUTPATIENT)
Dept: FAMILY MEDICINE | Facility: CLINIC | Age: 75
End: 2020-04-15

## 2020-04-16 ENCOUNTER — PATIENT MESSAGE (OUTPATIENT)
Dept: FAMILY MEDICINE | Facility: CLINIC | Age: 75
End: 2020-04-16

## 2020-04-16 ENCOUNTER — TELEPHONE (OUTPATIENT)
Dept: PODIATRY | Facility: CLINIC | Age: 75
End: 2020-04-16

## 2020-04-16 DIAGNOSIS — L97.929 VENOUS STASIS ULCER OF LEFT LOWER LEG WITH EDEMA OF LEFT LOWER LEG: ICD-10-CM

## 2020-04-16 DIAGNOSIS — I73.9 PVD (PERIPHERAL VASCULAR DISEASE): Primary | ICD-10-CM

## 2020-04-16 DIAGNOSIS — K21.9 GASTROESOPHAGEAL REFLUX DISEASE WITHOUT ESOPHAGITIS: ICD-10-CM

## 2020-04-16 DIAGNOSIS — S82.202F TYPE III OPEN FRACTURE OF LEFT TIBIA AND FIBULA WITH ROUTINE HEALING: ICD-10-CM

## 2020-04-16 DIAGNOSIS — I83.892 VENOUS STASIS ULCER OF LEFT LOWER LEG WITH EDEMA OF LEFT LOWER LEG: ICD-10-CM

## 2020-04-16 DIAGNOSIS — R60.0 VENOUS STASIS ULCER OF LEFT LOWER LEG WITH EDEMA OF LEFT LOWER LEG: ICD-10-CM

## 2020-04-16 DIAGNOSIS — S82.402F TYPE III OPEN FRACTURE OF LEFT TIBIA AND FIBULA WITH ROUTINE HEALING: ICD-10-CM

## 2020-04-16 DIAGNOSIS — I83.029 VENOUS STASIS ULCER OF LEFT LOWER LEG WITH EDEMA OF LEFT LOWER LEG: ICD-10-CM

## 2020-04-16 LAB — BACTERIA SPEC AEROBE CULT: ABNORMAL

## 2020-04-16 RX ORDER — GABAPENTIN 300 MG/1
300 CAPSULE ORAL 2 TIMES DAILY
Qty: 60 CAPSULE | Refills: 11 | Status: SHIPPED | OUTPATIENT
Start: 2020-04-16 | End: 2020-09-03 | Stop reason: SDUPTHER

## 2020-04-16 RX ORDER — CLINDAMYCIN HYDROCHLORIDE 300 MG/1
300 CAPSULE ORAL EVERY 8 HOURS
Qty: 30 CAPSULE | Refills: 0 | Status: SHIPPED | OUTPATIENT
Start: 2020-04-16 | End: 2020-04-26

## 2020-04-16 NOTE — TELEPHONE ENCOUNTER
----- Message from Sandra Gary DPM sent at 4/16/2020  2:02 PM CDT -----  Culture result positive for bacteria.  Rx for Cleocin 3 times per day for 10 days sent to his pharmacy

## 2020-04-17 LAB — BACTERIA SPEC ANAEROBE CULT: NORMAL

## 2020-04-17 RX ORDER — OMEPRAZOLE 20 MG/1
20 CAPSULE, DELAYED RELEASE ORAL DAILY PRN
Qty: 90 CAPSULE | Refills: 0 | Status: ON HOLD | OUTPATIENT
Start: 2020-04-17 | End: 2022-01-01 | Stop reason: HOSPADM

## 2020-04-23 ENCOUNTER — PATIENT OUTREACH (OUTPATIENT)
Dept: ADMINISTRATIVE | Facility: OTHER | Age: 75
End: 2020-04-23

## 2020-04-27 ENCOUNTER — OFFICE VISIT (OUTPATIENT)
Dept: PODIATRY | Facility: CLINIC | Age: 75
End: 2020-04-27
Payer: MEDICARE

## 2020-04-27 VITALS
SYSTOLIC BLOOD PRESSURE: 138 MMHG | DIASTOLIC BLOOD PRESSURE: 72 MMHG | WEIGHT: 315 LBS | BODY MASS INDEX: 41.75 KG/M2 | HEIGHT: 73 IN

## 2020-04-27 DIAGNOSIS — L97.929 VENOUS STASIS ULCER OF LEFT LOWER LEG WITH EDEMA OF LEFT LOWER LEG: ICD-10-CM

## 2020-04-27 DIAGNOSIS — I83.029 VENOUS STASIS ULCER OF LEFT LOWER LEG WITH EDEMA OF LEFT LOWER LEG: ICD-10-CM

## 2020-04-27 DIAGNOSIS — I83.892 VENOUS STASIS ULCER OF LEFT LOWER LEG WITH EDEMA OF LEFT LOWER LEG: ICD-10-CM

## 2020-04-27 DIAGNOSIS — I73.9 PVD (PERIPHERAL VASCULAR DISEASE): Primary | ICD-10-CM

## 2020-04-27 DIAGNOSIS — R60.0 VENOUS STASIS ULCER OF LEFT LOWER LEG WITH EDEMA OF LEFT LOWER LEG: ICD-10-CM

## 2020-04-27 PROCEDURE — 1126F PR PAIN SEVERITY QUANTIFIED, NO PAIN PRESENT: ICD-10-PCS | Mod: HCNC,S$GLB,, | Performed by: PODIATRIST

## 2020-04-27 PROCEDURE — 1159F MED LIST DOCD IN RCRD: CPT | Mod: HCNC,S$GLB,, | Performed by: PODIATRIST

## 2020-04-27 PROCEDURE — 3075F SYST BP GE 130 - 139MM HG: CPT | Mod: HCNC,CPTII,S$GLB, | Performed by: PODIATRIST

## 2020-04-27 PROCEDURE — 99213 PR OFFICE/OUTPT VISIT, EST, LEVL III, 20-29 MIN: ICD-10-PCS | Mod: HCNC,S$GLB,, | Performed by: PODIATRIST

## 2020-04-27 PROCEDURE — 99999 PR PBB SHADOW E&M-EST. PATIENT-LVL III: ICD-10-PCS | Mod: PBBFAC,HCNC,, | Performed by: PODIATRIST

## 2020-04-27 PROCEDURE — 3075F PR MOST RECENT SYSTOLIC BLOOD PRESS GE 130-139MM HG: ICD-10-PCS | Mod: HCNC,CPTII,S$GLB, | Performed by: PODIATRIST

## 2020-04-27 PROCEDURE — 1101F PT FALLS ASSESS-DOCD LE1/YR: CPT | Mod: HCNC,CPTII,S$GLB, | Performed by: PODIATRIST

## 2020-04-27 PROCEDURE — 3078F DIAST BP <80 MM HG: CPT | Mod: HCNC,CPTII,S$GLB, | Performed by: PODIATRIST

## 2020-04-27 PROCEDURE — 1126F AMNT PAIN NOTED NONE PRSNT: CPT | Mod: HCNC,S$GLB,, | Performed by: PODIATRIST

## 2020-04-27 PROCEDURE — 3078F PR MOST RECENT DIASTOLIC BLOOD PRESSURE < 80 MM HG: ICD-10-PCS | Mod: HCNC,CPTII,S$GLB, | Performed by: PODIATRIST

## 2020-04-27 PROCEDURE — 99999 PR PBB SHADOW E&M-EST. PATIENT-LVL III: CPT | Mod: PBBFAC,HCNC,, | Performed by: PODIATRIST

## 2020-04-27 PROCEDURE — 1101F PR PT FALLS ASSESS DOC 0-1 FALLS W/OUT INJ PAST YR: ICD-10-PCS | Mod: HCNC,CPTII,S$GLB, | Performed by: PODIATRIST

## 2020-04-27 PROCEDURE — 99213 OFFICE O/P EST LOW 20 MIN: CPT | Mod: HCNC,S$GLB,, | Performed by: PODIATRIST

## 2020-04-27 PROCEDURE — 1159F PR MEDICATION LIST DOCUMENTED IN MEDICAL RECORD: ICD-10-PCS | Mod: HCNC,S$GLB,, | Performed by: PODIATRIST

## 2020-04-27 NOTE — PATIENT INSTRUCTIONS
Please dressing clean, dry, and intact.    If dressing gets wet please contact our office.    Wear special shoe every time foot is placed on the floor.    Elevate affected foot as much as possible    Stay hydrated.      Nutrition and MyPlate: Protein Foods  This group includes foods that are high in protein. Protein helps the body build new cells and keeps tissues healthy. Most Americans get enough protein without even trying. It can be harder for vegetarians, but plenty of non-meat foods are rich in protein, too. Its best to get protein from a variety of sources.    Nutrient-Rich Choices  Theres a lot more to this food group than just meat and beans. It also includes nuts, seeds, and eggs. There are all sorts of nutrient-rich choices:  · Chicken and turkey with the skin removed  · Fish and shellfish  · Lean beef, pork, or lamb (without visible fat)  · Soy products, such as tofu, soybeans (edamame), tempeh, or soymilk  · Black beans, kidney beans, juárez beans, chickpeas (garbanzo beans), and lentils (Note: beans and peas count as both a protein and a vegetable)  · Peanuts, almonds, walnuts, sesame seeds, and sunflower  seeds, as well as foods made from these (such as peanut butter or tahini)  · Eggs and foods made with eggs (such as quiche or frittata)  What Makes Meat and Beans Less Healthy?  · Fatty meat is not healthy. Before you cook meat, trim off all the fat you can see. Chicken and turkey skin is also high in fat, and should be removed before cooking.  · Breading and frying make food less healthy. This includes dishes like fried chicken, fried fish, and refried beans.  · Sausage and lunch meats tend to be high in fat and salt. Buy low-fat, low-sodium versions.  One Small Change  Make a meal that includes a non-meat source of protein (such as tofu, lentils, or any other food listed above). Have a better idea? Write it here:  _____________________________________________________________  © 2000-2015 [employer]. [address] [place] [address], [place] [address]. All rights reserved. This information is not intended as a substitute for professional medical care. Always follow your healthcare professional's instructions.

## 2020-04-27 NOTE — PROGRESS NOTES
Subjective:      Patient ID: Janusz Montgomery Jr. is a 75 y.o. male.    Chief Complaint: Wound Check      Janusz Montgomery Jr. is a 75 y.o. malewho presents to the clinic for evaluation and treatment of high risk feet. Janusz Montgomery Jr.   has a past medical history of NAT (acute kidney injury) (3/19/2017), ALLERGIC RHINITIS, Anemia, Anxiety, Basal cell carcinoma (10/19/2018), Basal cell carcinoma (01/09/2019), Chronic rhinitis (5/3/2013), Chronic rhinitis (5/3/2013), Coronary artery disease involving native coronary artery of native heart without angina pectoris s/p RCA stent, Cortical cataract of both eyes (3/18/2016), Delayed sleep phase syndrome (3/13/2019), Depression, Erectile dysfunction (3/24/2014), Erectile dysfunction (3/24/2014), Essential hypertension, GERD (gastroesophageal reflux disease) (7/25/2012), Gout, arthritis, Grade III open fracture of left tibia and fibula s/p ex-fix on 7/1/16 and ORIF of left tibia on 7/15 (7/6/2016), H/O: iritis, Helicobacter pylori (H. 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  The patient's chief complaint is  an anterior left leg wound.  One month duration.  Secondary to mild trauma in the presence of edema.  Has attempted self treatment but wound has not healed.  Non painful. Denies nausea, vomiting, fever, chills. History of left foot and ankle ulcers.  This patient has documented high risk feet requiring routine maintenance secondary to peripheral vascular disease.    12/10/2019  Patient relates he has attempted to care for leg ulcer as instructed but wound dimensions have not improved.    12/19/2019 patient relates that he has kept dressing clean dry and intact without itching or discomfort.  He has begun taking antibiotics as prescribed without nausea, vomiting, diarrhea, fever, chills.  No new pedal complaints.    12/31/19: F/u left leg ulceration. Has been in unna boot without issue.     01/07/2020 Patient relates that he has kept dressing clean dry and intact without itching or discomfort. He relates increased callus to posterior heel.  He denies nausea, vomiting, diarrhea, fever, chills.  No new pedal complaints.    01/14/2020  Patient relates that he has kept dressing clean dry and intact without itching however he relates that dressing was slipping and therefore somewhat uncomfortable this week.   He denies nausea, vomiting, diarrhea, fever, chills.  No new pedal complaints.    01/21/2020 patient relates that he has kept dressing clean, dry, intact.  He has no pedal complaints.  He does eyes nausea, vomiting, fever, chills.  He is anxious to get out of Unna boot.    1/29/20: F/u left leg ulceration. Has been wearing tubi . Pt. Is concerned regarding wound appearance.     02/06/2020 patient relates that he has kept dressing clean, dry, intact.  He has no pedal complaints.  He does eyes nausea, vomiting, fever, chills.  He relates that he has a cardiovascular procedure scheduled next week.     02/13/2020 patient relates that he has kept dressing clean, dry, intact.  He has no pedal complaints.  He denies  nausea, vomiting, fever, chills.      02/24/2020 patient relates that he has kept dressing clean, dry, and intact.  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 He denies nausea, vomiting, fever, chills.      03/02/2020 patient relates that he has kept dressing clean, dry, and intact.   He denies nausea, vomiting, fever, chills.        03/16/2020 patient relates that he has kept dressing clean, dry, and intact.   He denies nausea, vomiting, fever, chills.      03/23/2020 patient relates that he was able to keep dressing clean, dry, intact until today.  Relates that his leg and foot got wet in the shower.  He states that he has been elevating the leg to that of his ability and taking his diuretics as prescribed however he still feels as if he is retaining fluid.  Patient denies nausea, vomiting, fever, chills.  No new pedal complaints.     04/13/2020 for the last 2 weeks patient has been caring for the wound at home with supplies that he has from previous wounds.  He has been using a Tubigrip for compression.  He relates that for the last several days he has had increase in itching and increased redness to the left leg.  Patient denies nausea, vomiting, fever, chills.    04/27/2020  patient relates that he has kept dressing clean, dry, and intact.   He denies nausea, vomiting, fever, chills.      PCP: Miguelina Weathers MD    Date Last Seen by PCP:   Chief Complaint   Patient presents with    Wound Check     Current shoe gear: tennis shoe    Hemoglobin A1C   Date Value Ref Range Status   02/11/2020 5.6 4.0 - 5.6 % Final     Comment:     ADA Screening Guidelines:  5.7-6.4%  Consistent with prediabetes  >or=6.5%  Consistent with diabetes  High levels of fetal hemoglobin interfere with the HbA1C  assay. Heterozygous  hemoglobin variants (HbS, HgC, etc)do  not significantly interfere with this assay.   However, presence of multiple variants may affect accuracy.     2016 5.3 4.5 - 6.2 % Final     Comment:     According to ADA guidelines, hemoglobin A1C <7.0% represents  optimal control in non-pregnant diabetic patients.  Different  metrics may apply to specific populations.   Standards of Medical Care in Diabetes - 2016.  For the purpose of screening for the presence of diabetes:  <5.7%     Consistent with the absence of diabetes  5.7-6.4%  Consistent with increasing risk for diabetes   (prediabetes)  >or=6.5%  Consistent with diabetes  Currently no consensus exists for use of hemoglobin A1C  for diagnosis of diabetes for children.     2015 5.3 4.5 - 6.2 % Final         Current Outpatient Medications on File Prior to Visit   Medication Sig Dispense Refill    acyclovir (ZOVIRAX) 800 MG Tab Take 1 tablet (800 mg total) by mouth 2 (two) times daily. 180 tablet 6    albuterol (PROVENTIL/VENTOLIN HFA) 90 mcg/actuation inhaler Inhale 2 puffs into the lungs every 6 (six) hours as needed for Wheezing. Rescue 18 g 0    apixaban (ELIQUIS) 5 mg Tab Take 1 tablet (5 mg total) by mouth 2 (two) times daily. 180 tablet 4    artificial tears (ISOPTO TEARS) 0.5 % ophthalmic solution Place 1 drop into both eyes as needed. (Patient taking differently: Place 1 drop into both eyes as needed (for dry eyes). )      atorvastatin (LIPITOR) 40 MG tablet Take 1 tablet (40 mg total) by mouth once daily. Please make a follow up visit with your doctor for more refills of this medication 90 tablet 0    azelastine (ASTELIN) 137 mcg (0.1 %) nasal spray 1 spray (137 mcg total) by Nasal route 2 (two) times daily. 30 mL 2    [] clindamycin (CLEOCIN) 300 MG capsule Take 1 capsule (300 mg total) by mouth every 8 (eight) hours. for 10 days 30 capsule 0    clopidogreL (PLAVIX) 75 mg tablet Take 1 tablet (75 mg total) by mouth once daily. 90  tablet 3    CYANOCOBALAMIN, VITAMIN B-12, (VITAMIN B-12 ORAL) Take 2,500 mcg by mouth once daily.      doxycycline (VIBRA-TABS) 100 MG tablet Take 1 tablet (100 mg total) by mouth 2 (two) times daily. 20 tablet 0    fluticasone propionate (FLONASE) 50 mcg/actuation nasal spray 1 spray (50 mcg total) by Each Nostril route once daily. 1 Bottle 0    furosemide (LASIX) 20 MG tablet Take 1 tablet (20 mg total) by mouth once daily. 90 tablet 0    gabapentin (NEURONTIN) 300 MG capsule Take 1 capsule (300 mg total) by mouth 2 (two) times daily. 60 capsule 11    melatonin 5 mg Tab Take 10 mg by mouth nightly.      multivitamin (MULTIPLE VITAMINS) per tablet Take 1 tablet by mouth once daily.      nitroGLYCERIN (NITROSTAT) 0.4 MG SL tablet Place 1 tablet (0.4 mg total) under the tongue every 5 (five) minutes as needed for Chest pain. 25 tablet 11    olmesartan (BENICAR) 20 MG tablet Take 1 tablet (20 mg total) by mouth once daily. (replaces ramipril for blood pressure) 90 tablet 1    omeprazole (PRILOSEC) 20 MG capsule Take 1 capsule (20 mg total) by mouth daily as needed. 90 capsule 0    oxybutynin (DITROPAN-XL) 5 MG TR24 Take 1 tablet (5 mg total) by mouth once daily. 30 tablet 11    oxyCODONE-acetaminophen (PERCOCET) 5-325 mg per tablet Take 1 tablet by mouth every 4 to 6 hours as needed for Pain. 20 tablet 0    tamsulosin (FLOMAX) 0.4 mg Cap Take 1 capsule (0.4 mg total) by mouth once daily. 90 capsule 3    venlafaxine (EFFEXOR) 75 MG tablet Take 2 tablets by mouth twice daily 360 tablet 0    vitamin D 1000 units Tab Take 1 tablet (1,000 Units total) by mouth once daily.       No current facility-administered medications on file prior to visit.        Allergies   Allergen Reactions    Ciprofloxacin Rash     Diffuse pruritic morbilliform rash developed 3/15/2017 after dose of cipro; previously in 2/2017 he had rash/fevers after initiation of cipro    Zosyn [Piperacillin-Tazobactam] Anaphylaxis     Diffuse  "pruritic morbilliform rash developed 3/15/2017.  Then, 430am dose on 3/16 and rash worsened with SOB/tachypnea but no hypoxemia.     Bacitracin Itching and Rash     Violaceous rash in area of topical Tx.        Past Surgical History:   Procedure Laterality Date    Cardiac stenting x2      CATARACT EXTRACTION W/  INTRAOCULAR LENS IMPLANT Right 3/29/2016    Dr. Conteh    CATARACT EXTRACTION W/  INTRAOCULAR LENS IMPLANT Left 4/12/2016        EXTERNAL FIXATION TIBIAL FRACTURE Left 07/01/2016    INSERTION OF IMPLANTABLE LOOP RECORDER  04/07/2017    LEFT HEART CATHETERIZATION Left 1/30/2020    Procedure: Left heart cath;  Surgeon: Benjamin Sandhu MD;  Location: [facility] CATH LAB;  Service: Cardiology;  Laterality: Left;  RN PREOP 1/28/20--Pt starting Plavix loading dose today (8pills)- Dr. Sandhu aware.  Pt has a bandaged "non healing area to LLE"--Dr. Sandhu aware.    LEFT HEART CATHETERIZATION Left 2/14/2020    Procedure: Left heart cath, IVUS guided left main / LAD PCI. Noon start, radial approach;  Surgeon: Miguel Angel Brady MD;  Location: [facility] CATH LAB;  Service: Cardiology;  Laterality: Left;  RN PRE OP 2-7-2020  BMI--45.61    ORIF TIBIA FRACTURE Left 07/15/2016    RADIOACTIVE SEED IMPLANTATION OF PROSTATE N/A 8/8/2018    Procedure: INSERTION, RADIOACTIVE SEED, PROSTATE;  Surgeon: Bipin Thompson MD;  Location: [facility] OR [address] [place] [address];  Service: Urology;  Laterality: N/A;  1 hour    Squamous cell cancer removal x3 with Mohs surgery      TONSILLECTOMY      TOTAL KNEE ARTHROPLASTY  10/2012    trus/bx      ULTRASOUND GUIDANCE  2/14/2020    Procedure: Ultrasound Guidance;  Surgeon: Miguel Angel Brady MD;  Location: [facility] CATH LAB;  Service: Cardiology;;       Family History   Problem Relation Age of Onset    Skin cancer Father     Lung cancer Father     Cancer Father         smoker,     Alzheimer's disease Mother     Hypertension Mother     Cancer Sister         colon, lung cancer     No Known " Problems Sister     Cancer Brother         skin cancer, polypectomy     Peripheral vascular disease Unknown     No Known Problems Maternal Aunt     No Known Problems Maternal Uncle     No Known Problems Paternal Aunt     No Known Problems Paternal Uncle     No Known Problems Maternal Grandmother     No Known Problems Maternal Grandfather     No Known Problems Paternal Grandmother     No Known Problems Paternal Grandfather     Melanoma Neg Hx     Psoriasis Neg Hx     Lupus Neg Hx     Eczema Neg Hx     Amblyopia Neg Hx     Blindness Neg Hx     Cataracts Neg Hx     Diabetes Neg Hx     Glaucoma Neg Hx     Macular degeneration Neg Hx     Retinal detachment Neg Hx     Strabismus Neg Hx     Stroke Neg Hx     Thyroid disease Neg Hx     Acne Neg Hx        Social History     Socioeconomic History    Marital status:      Spouse name: Not on file    Number of children: Not on file    Years of education: Not on file    Highest education level: Not on file   Occupational History    Occupation: Retired    Social Needs    Financial resource strain: Not hard at all    Food insecurity:     Worry: Never true     Inability: Never true    Transportation needs:     Medical: No     Non-medical: No   Tobacco Use    Smoking status: Never Smoker    Smokeless tobacco: Never Used   Substance and Sexual Activity    Alcohol use: Yes     Frequency: 2-4 times a month     Drinks per session: 3 or 4     Binge frequency: Less than monthly     Comment: occasionally, beer    Drug use: Never    Sexual activity: Yes     Partners: Female   Lifestyle    Physical activity:     Days per week: Not on file     Minutes per session: Not on file    Stress: Not on file   Relationships    Social connections:     Talks on phone: Three times a week     Gets together: Once a week     Attends [employer] service: More than 4 times per year     Active member of club or organization: Yes     Attends meetings of clubs or  "organizations: More than 4 times per year     Relationship status:    Other Topics Concern    Not on file   Social History Narrative    Not on file       Review of Systems   Constitution: Negative for chills and fever.   Cardiovascular: Negative for claudication and leg swelling.   Respiratory: Negative for cough and shortness of breath.    Skin: Positive for dry skin, itching (Left leg) and poor wound healing. Negative for rash.   Musculoskeletal: Positive for arthritis, joint pain, joint swelling and myalgias. Negative for falls and muscle weakness.   Gastrointestinal: Negative for diarrhea, nausea and vomiting.   Neurological: Positive for paresthesias. Negative for numbness, tremors and weakness.   Psychiatric/Behavioral: Negative for altered mental status and hallucinations.         Objective:       Vitals:    04/27/20 1546   BP: 138/72   Weight: (!) 152.9 kg (337 lb)   Height: 6' 1" (1.854 m)   PainSc: 0-No pain       Physical Exam   Constitutional:  Non-toxic appearance. He does not have a sickly appearance. No distress.   Pt. is well-developed, well-nourished, appears stated age, in no acute distress, alert and oriented x 3. No evidence of depression, anxiety, or agitation. Calm, cooperative, and communicative. Appropriate interactions and affect.   Cardiovascular:   Pulses:       Dorsalis pedis pulses are 2+ on the right side, and 2+ on the left side.        Posterior tibial pulses are 1+ on the right side, and 1+ on the left side.   There is decreased digital hair. Skin is atrophic, slightly hyperpigmented, and pitting edema nora (L>>R)     Pulmonary/Chest: No respiratory distress.   Musculoskeletal:        Right ankle: He exhibits swelling. No tenderness. No lateral malleolus, no medial malleolus, no AITFL, no CF ligament, no head of 5th metatarsal and no proximal fibula tenderness found. Achilles tendon exhibits no pain, no defect and normal Zabala's test results.        Left ankle: He exhibits " swelling. No tenderness. No lateral malleolus, no medial malleolus, no AITFL, no CF ligament and no posterior TFL tenderness found. Achilles tendon exhibits no pain, no defect and normal Zabala's test results.        Right foot: There is no tenderness and no bony tenderness.        Left foot: There is no tenderness and no bony tenderness.   Decreased stride, station of gait.  apropulsive toe off.  Increased angle and base of gait.    Patient has hammertoes of digits 2-5 bilateral partially reducible without symptom today.     There is equinus deformity bilateral. There is limitation of dorsiflexion with knees extended and with knees flexed.    Shoes reveals lateral heel counter wear bilateral in athletic shoes.        Lymphadenopathy:   No lymphatic streaking    Negative lymphadenopathy bilateral popliteal fossa and tarsal tunnel.     Neurological:   [place] 5.07 monofilament is intact bilateral feet. Sharp/dull sensation is also intact Bilateral feet. Proprioception is grossly intact. Vibratory sensation intact (pt able to sense vibration stop within 3-5 seconds)     Skin: Skin is warm and dry. Lesion noted. No bruising, no burn, no laceration and no rash noted. He is not diaphoretic. No cyanosis. No pallor. Nails show no clubbing.   Redness and weeping noted to the left lower leg    Ulcer location: posterior left heel  Signs of infection:none  Drainage:  None  Periwound: intact  Base: thin epithelial with bleeding within layers    Ulcer location: left anterior leg as pictured  Signs of infection:  Local edema and erythema  Drainage: Sero-Sanguinous  Purulence: no  Crepitus/fluctuance: no  Periwound: Reddened  Base: Granular/Healthy  Depth: subcutaneous tissue  Probe to bone: no   Psychiatric: His mood appears not anxious. His affect is not inappropriate. His speech is not slurred. He is not combative. He is communicative. He is attentive.   Nursing note  reviewed.      4/27/2020 04/13/2020 03/23/2020 03/16/2020 03/09/2020 03/02/2020 02/24/20 02/13/2020 02/06/20 1/31/20 01/21/20 01/14/20 01/07/20                Assessment:       Encounter Diagnoses   Name Primary?    PVD (peripheral vascular disease) Yes    Venous stasis ulcer of left lower leg with edema of left lower leg          Plan:       Janusz was seen today for wound check.    Diagnoses and all orders for this visit:    PVD (peripheral vascular disease)    Venous stasis ulcer of left lower leg with edema of left lower leg      I counseled the patient on his conditions, their implications and medical management.    The site is cleansed of foreign material as much as possible. The patient is alerted to watch for any signs of infection (redness, pus, pain, increased swelling or fever) and call if such occurs.    Wound has improved.    Fabienne applied to wound bed.   Calamine Coflex for compression to the leg    Patient is to elevate legs. When sleeping, place a pillow under lower extremities. When sitting, support the legs so that they are level with the waist.      Follow-up:Patient is to return to the clinic in 2-4 weeks for follow-up but should call [facility] immediately if any signs of infection, such as fever, chills, sweats, increased redness or pain.    Short-term goals include maintaining good offloading and minimizing bioburden, promoting granulation and epithelialization to healing.  Long-term goals include keeping the wound healed by good offloading and medical management under the direction of internist.

## 2020-04-28 ENCOUNTER — OFFICE VISIT (OUTPATIENT)
Dept: GASTROENTEROLOGY | Facility: CLINIC | Age: 75
End: 2020-04-28
Payer: MEDICARE

## 2020-04-28 VITALS — BODY MASS INDEX: 41.75 KG/M2 | WEIGHT: 315 LBS | HEIGHT: 73 IN

## 2020-04-28 DIAGNOSIS — R68.81 EARLY SATIETY: Primary | ICD-10-CM

## 2020-04-28 PROCEDURE — 99203 OFFICE O/P NEW LOW 30 MIN: CPT | Mod: HCNC,95,, | Performed by: INTERNAL MEDICINE

## 2020-04-28 PROCEDURE — 99203 PR OFFICE/OUTPT VISIT, NEW, LEVL III, 30-44 MIN: ICD-10-PCS | Mod: HCNC,95,, | Performed by: INTERNAL MEDICINE

## 2020-04-28 PROCEDURE — 1159F PR MEDICATION LIST DOCUMENTED IN MEDICAL RECORD: ICD-10-PCS | Mod: HCNC,95,, | Performed by: INTERNAL MEDICINE

## 2020-04-28 PROCEDURE — 1101F PT FALLS ASSESS-DOCD LE1/YR: CPT | Mod: HCNC,CPTII,95, | Performed by: INTERNAL MEDICINE

## 2020-04-28 PROCEDURE — 1101F PR PT FALLS ASSESS DOC 0-1 FALLS W/OUT INJ PAST YR: ICD-10-PCS | Mod: HCNC,CPTII,95, | Performed by: INTERNAL MEDICINE

## 2020-04-28 PROCEDURE — 1159F MED LIST DOCD IN RCRD: CPT | Mod: HCNC,95,, | Performed by: INTERNAL MEDICINE

## 2020-04-28 RX ORDER — METOPROLOL SUCCINATE 25 MG/1
25 TABLET, EXTENDED RELEASE ORAL DAILY
COMMUNITY
Start: 2020-04-07 | End: 2021-01-13

## 2020-04-28 NOTE — PROGRESS NOTES
The patient location is: home  The chief complaint leading to consultation is: upper abdominal discomfort  Visit type: audiovisual due to COVID -19 pandemic  Total time spent with patient: 20 minutes  Each patient to whom he or she provides medical services by telemedicine is:  (1) informed of the relationship between the physician and patient and the respective role of any other health care provider with respect to management of the patient; and (2) notified that he or she may decline to receive medical services by telemedicine and may withdraw from such care at any time.    Reason for visit:  Upper abdominal discomfort and postprandial fullness    HPI:  Mr. Montgomery is a 75-year-old gentleman with obesity, hypertension/coronary artery disease/paroxysmal atrial fibrillation, bilateral carotid artery disease, hyperlipidemia, and depression.  He been having problems with shortness of breath for a while and according to him nothing significant was found. He was found to have a coronary artery blockage.  He was originally evaluated for CABG, but was not deemed a good candidate due to his weight.  He underwent successful stenting recently.  He states in some ways his shortness of breath is improved.  He does still have some shortness of breath due to deconditioning.  His primary complaints today involves bloating and upper abdominal discomfort that occurs quickly whenever he eats.  He eats small portion of meals than he is used to, therefore he continues to be hungry.  He is in the process of trying to improve his dietary habits as well as his exercise habits.  He is aware of the need for significant weight loss. He has poor sleep habits.  He states he sometimes sleeps 9-12 hours a day.  He does report compliance with his CPAP machine.  He denies any dysphagia or odynophagia.  No nausea or vomiting.  He does have intermittent heartburn for which she takes omeprazole 20 mg on a p.r.n. basis.  His bowel habits are erratic  changing from sometimes once to twice a day to once every 2-3 days.  No blood in the stool.  He has not had undergone an endoscopy or colonoscopy in 8-10 years.  He denies any severe abdominal pain.      Past medical, surgical, social family history reviewed in epic    Medication allergies reviewed in epic    Review of systems:    Constitutional:  No fever, no chills, no unintentional weight loss, appetite S normal  Eyes:  No visual changes or red eyes  ENT:  No odynophagia or hoarseness of voice  Cardiovascular:  No angina or palpitation  Respiratory:  Dyspnea on exertion, no cough  Genitourinary:  No dysuria or frequency, has urgency  Musculoskeletal:  No myalgias or arthralgias  Skin:  No pruritus or eczema  Neurologic:  No headaches or seizures  Gastrointestinal:  See HPI    Physical exam:  Awake, alert, oriented x3  No physical exam (virtual visit)    Impression:  Postprandial bloating and early satiety.    Recommendation:  Will proceed with EGD and colonoscopy on the 2nd floor due to his comorbidities including obesity, sleep apnea, restrictive lung disease, coronary artery disease.  Will schedule the procedure when the current COVID pandemic restrictions are lifted.

## 2020-04-28 NOTE — LETTER
April 28, 2020      Miguelina Weathers MD  [address]  [place] [address]           [facility]  [address]  [place] [address]  Phone: [phone]  Fax: [phone]          Patient: Janusz Montgomery Jr.   MR Number: [id number]   YOB: 1945   Date of Visit: 4/28/2020       Dear Dr. Miguelina Weathers:    Thank you for referring Janusz Montgomery to me for evaluation. Attached you will find relevant portions of my assessment and plan of care.    If you have questions, please do not hesitate to call me. I look forward to following Janusz Montgomery along with you.    Sincerely,    Nico Lackey MD    Enclosure  CC:  No Recipients    If you would like to receive this communication electronically, please contact [email or web address] or [phone] to request more information on [employer] Link access.    For providers and/or their staff who would like to refer a patient to [facility], please contact us through our one-stop-shop provider referral line, [facility], at [phone].    If you feel you have received this communication in error or would no longer like to receive these types of communications, please e-mail [email or web address]

## 2020-04-30 NOTE — PROGRESS NOTES
Sent [facility] message to follow up with Mr. Blumyd ALYSIA Montgomery Jr..    Per 30 day average, blood pressure is well controlled 105/59 mmHg. Encouraged adherence to low sodium diet and physical activity guidelines. Advised patient to call or message with questions or concerns.     Hypertension Medications             furosemide (LASIX) 20 MG tablet Take 1 tablet (20 mg total) by mouth once daily.    metoprolol succinate (TOPROL-XL) 25 MG 24 hr tablet     nitroGLYCERIN (NITROSTAT) 0.4 MG SL tablet Place 1 tablet (0.4 mg total) under the tongue every 5 (five) minutes as needed for Chest pain.    olmesartan (BENICAR) 20 MG tablet Take 1 tablet (20 mg total) by mouth once daily. (replaces ramipril for blood pressure)

## 2020-05-06 ENCOUNTER — PATIENT MESSAGE (OUTPATIENT)
Dept: ADMINISTRATIVE | Facility: OTHER | Age: 75
End: 2020-05-06

## 2020-05-08 ENCOUNTER — CLINICAL SUPPORT (OUTPATIENT)
Dept: CARDIOLOGY | Facility: HOSPITAL | Age: 75
End: 2020-05-08
Attending: INTERNAL MEDICINE
Payer: MEDICARE

## 2020-05-08 ENCOUNTER — PATIENT OUTREACH (OUTPATIENT)
Dept: ADMINISTRATIVE | Facility: OTHER | Age: 75
End: 2020-05-08

## 2020-05-08 DIAGNOSIS — Z46.9 FITTING AND ADJUSTMENT OF DEVICE: ICD-10-CM

## 2020-05-08 PROCEDURE — 93298 CARDIAC DEVICE CHECK - REMOTE: ICD-10-PCS | Mod: HCNC,,, | Performed by: INTERNAL MEDICINE

## 2020-05-08 PROCEDURE — G2066 INTER DEVC REMOTE 30D: HCPCS | Mod: HCNC | Performed by: INTERNAL MEDICINE

## 2020-05-08 PROCEDURE — 93298 REM INTERROG DEV EVAL SCRMS: CPT | Mod: HCNC,,, | Performed by: INTERNAL MEDICINE

## 2020-05-11 ENCOUNTER — OFFICE VISIT (OUTPATIENT)
Dept: PODIATRY | Facility: CLINIC | Age: 75
End: 2020-05-11
Payer: MEDICARE

## 2020-05-11 VITALS — WEIGHT: 315 LBS | HEIGHT: 73 IN | BODY MASS INDEX: 41.75 KG/M2

## 2020-05-11 DIAGNOSIS — I83.029 VENOUS STASIS ULCER OF LEFT LOWER LEG WITH EDEMA OF LEFT LOWER LEG: ICD-10-CM

## 2020-05-11 DIAGNOSIS — I73.9 PVD (PERIPHERAL VASCULAR DISEASE): Primary | ICD-10-CM

## 2020-05-11 DIAGNOSIS — I83.892 VENOUS STASIS ULCER OF LEFT LOWER LEG WITH EDEMA OF LEFT LOWER LEG: ICD-10-CM

## 2020-05-11 DIAGNOSIS — R60.0 VENOUS STASIS ULCER OF LEFT LOWER LEG WITH EDEMA OF LEFT LOWER LEG: ICD-10-CM

## 2020-05-11 DIAGNOSIS — L97.929 VENOUS STASIS ULCER OF LEFT LOWER LEG WITH EDEMA OF LEFT LOWER LEG: ICD-10-CM

## 2020-05-11 PROCEDURE — 99213 OFFICE O/P EST LOW 20 MIN: CPT | Mod: HCNC,S$GLB,, | Performed by: PODIATRIST

## 2020-05-11 PROCEDURE — 99999 PR PBB SHADOW E&M-EST. PATIENT-LVL III: ICD-10-PCS | Mod: PBBFAC,HCNC,, | Performed by: PODIATRIST

## 2020-05-11 PROCEDURE — 1126F PR PAIN SEVERITY QUANTIFIED, NO PAIN PRESENT: ICD-10-PCS | Mod: HCNC,S$GLB,, | Performed by: PODIATRIST

## 2020-05-11 PROCEDURE — 99999 PR PBB SHADOW E&M-EST. PATIENT-LVL III: CPT | Mod: PBBFAC,HCNC,, | Performed by: PODIATRIST

## 2020-05-11 PROCEDURE — 99213 PR OFFICE/OUTPT VISIT, EST, LEVL III, 20-29 MIN: ICD-10-PCS | Mod: HCNC,S$GLB,, | Performed by: PODIATRIST

## 2020-05-11 PROCEDURE — 1159F PR MEDICATION LIST DOCUMENTED IN MEDICAL RECORD: ICD-10-PCS | Mod: HCNC,S$GLB,, | Performed by: PODIATRIST

## 2020-05-11 PROCEDURE — 1159F MED LIST DOCD IN RCRD: CPT | Mod: HCNC,S$GLB,, | Performed by: PODIATRIST

## 2020-05-11 PROCEDURE — 1101F PT FALLS ASSESS-DOCD LE1/YR: CPT | Mod: HCNC,CPTII,S$GLB, | Performed by: PODIATRIST

## 2020-05-11 PROCEDURE — 1126F AMNT PAIN NOTED NONE PRSNT: CPT | Mod: HCNC,S$GLB,, | Performed by: PODIATRIST

## 2020-05-11 PROCEDURE — 1101F PR PT FALLS ASSESS DOC 0-1 FALLS W/OUT INJ PAST YR: ICD-10-PCS | Mod: HCNC,CPTII,S$GLB, | Performed by: PODIATRIST

## 2020-05-11 NOTE — PROGRESS NOTES
Subjective:      Patient ID: Janusz Montgomery Jr. is a 75 y.o. male.    Chief Complaint: Wound Care (Pcp Dr. Weathers ) and Wound Check      Janusz Montgomery Jr. is a 75 y.o. malewho presents to the clinic for evaluation and treatment of high risk feet. Janusz Montgomery Jr.   has a past medical history of NAT (acute kidney injury) (3/19/2017), ALLERGIC RHINITIS, Anemia, Anxiety, Basal cell carcinoma (10/19/2018), Basal cell carcinoma (01/09/2019), Chronic rhinitis (5/3/2013), Chronic rhinitis (5/3/2013), Coronary artery disease involving native coronary artery of native heart without angina pectoris s/p RCA stent, Cortical cataract of both eyes (3/18/2016), Delayed sleep phase syndrome (3/13/2019), Depression, Erectile dysfunction (3/24/2014), Erectile dysfunction (3/24/2014), Essential hypertension, GERD (gastroesophageal reflux disease) (7/25/2012), Gout, arthritis, Grade III open fracture of left tibia and fibula s/p ex-fix on 7/1/16 and ORIF of left tibia on 7/15 (7/6/2016), H/O: iritis, Helicobacter pylori (H. 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   The patient's chief complaint is an anterior left leg wound.  One month duration.  Secondary to mild trauma in the presence of edema.  Has attempted self treatment but wound has not healed.  Non painful. Denies nausea, vomiting, fever, chills. History of left foot and ankle ulcers.  This patient has documented high risk feet requiring routine maintenance secondary to peripheral vascular disease.    12/10/2019  Patient relates he has attempted to care for leg ulcer as instructed but wound dimensions have not improved.    12/19/2019 patient relates that he has kept dressing clean dry and intact without itching or discomfort.  He has begun taking antibiotics as prescribed without nausea, vomiting, diarrhea, fever, chills.  No new pedal complaints.    12/31/19: F/u left leg ulceration. Has been in unna boot without issue.     01/07/2020 Patient relates that he has kept dressing clean dry and intact without itching or discomfort. He relates increased callus to posterior heel.  He denies nausea, vomiting, diarrhea, fever, chills.  No new pedal complaints.    01/14/2020  Patient relates that he has kept dressing clean dry and intact without itching however he relates that dressing was slipping and therefore somewhat uncomfortable this week.   He denies nausea, vomiting, diarrhea, fever, chills.  No new pedal complaints.    01/21/2020 patient relates that he has kept dressing clean, dry, intact.  He has no pedal complaints.  He does eyes nausea, vomiting, fever, chills.  He is anxious to get out of Unna boot.    1/29/20: F/u left leg ulceration. Has been wearing tubi . Pt. Is concerned regarding wound appearance.     02/06/2020 patient relates that he has kept dressing clean, dry, intact.  He has no pedal complaints.  He does eyes nausea, vomiting, fever, chills.  He relates that he has a cardiovascular procedure scheduled next week.     02/13/2020 patient relates that he has kept dressing clean, dry, intact.  He has no  pedal complaints.  He denies nausea, vomiting, fever, chills.      02/24/2020 patient relates that he has kept dressing clean, dry, and intact.  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 He denies nausea, vomiting, fever, chills.      03/02/2020 patient relates that he has kept dressing clean, dry, and intact.   He denies nausea, vomiting, fever, chills.        03/16/2020 patient relates that he has kept dressing clean, dry, and intact.   He denies nausea, vomiting, fever, chills.      03/23/2020 patient relates that he was able to keep dressing clean, dry, intact until today.  Relates that his leg and foot got wet in the shower.  He states that he has been elevating the leg to that of his ability and taking his diuretics as prescribed however he still feels as if he is retaining fluid.  Patient denies nausea, vomiting, fever, chills.  No new pedal complaints.     04/13/2020 for the last 2 weeks patient has been caring for the wound at home with supplies that he has from previous wounds.  He has been using a Tubigrip for compression.  He relates that for the last several days he has had increase in itching and increased redness to the left leg.  Patient denies nausea, vomiting, fever, chills.    04/27/2020  patient relates that he has kept dressing clean, dry, and intact.   He denies nausea, vomiting, fever, chills.     05/11/2020 patient relates that he has kept dressing clean, dry, and intact.   He denies nausea, vomiting, fever, chills.     PCP: Miguelina Weathers MD    Date Last Seen by PCP:   Chief Complaint   Patient presents with    Wound Care     Pcp Dr. Weathers     Wound Check     Current shoe gear: tennis shoe    Hemoglobin A1C   Date Value Ref Range Status   02/11/2020 5.6 4.0 - 5.6 % Final     Comment:      ADA Screening Guidelines:  5.7-6.4%  Consistent with prediabetes  >or=6.5%  Consistent with diabetes  High levels of fetal hemoglobin interfere with the HbA1C  assay. Heterozygous hemoglobin variants (HbS, HgC, etc)do  not significantly interfere with this assay.   However, presence of multiple variants may affect accuracy.     07/11/2016 5.3 4.5 - 6.2 % Final     Comment:     According to ADA guidelines, hemoglobin A1C <7.0% represents  optimal control in non-pregnant diabetic patients.  Different  metrics may apply to specific populations.   Standards of Medical Care in Diabetes - 2016.  For the purpose of screening for the presence of diabetes:  <5.7%     Consistent with the absence of diabetes  5.7-6.4%  Consistent with increasing risk for diabetes   (prediabetes)  >or=6.5%  Consistent with diabetes  Currently no consensus exists for use of hemoglobin A1C  for diagnosis of diabetes for children.     03/31/2015 5.3 4.5 - 6.2 % Final         Current Outpatient Medications on File Prior to Visit   Medication Sig Dispense Refill    acyclovir (ZOVIRAX) 800 MG Tab Take 1 tablet (800 mg total) by mouth 2 (two) times daily. 180 tablet 6    albuterol (PROVENTIL/VENTOLIN HFA) 90 mcg/actuation inhaler Inhale 2 puffs into the lungs every 6 (six) hours as needed for Wheezing. Rescue 18 g 0    apixaban (ELIQUIS) 5 mg Tab Take 1 tablet (5 mg total) by mouth 2 (two) times daily. 180 tablet 4    artificial tears (ISOPTO TEARS) 0.5 % ophthalmic solution Place 1 drop into both eyes as needed. (Patient taking differently: Place 1 drop into both eyes as needed (for dry eyes). )      atorvastatin (LIPITOR) 40 MG tablet Take 1 tablet (40 mg total) by mouth once daily. Please make a follow up visit with your doctor for more refills of this medication 90 tablet 0    azelastine (ASTELIN) 137 mcg (0.1 %) nasal spray 1 spray (137 mcg total) by Nasal route 2 (two) times daily. 30 mL 2    clopidogreL (PLAVIX) 75 mg tablet Take 1  tablet (75 mg total) by mouth once daily. 90 tablet 3    CYANOCOBALAMIN, VITAMIN B-12, (VITAMIN B-12 ORAL) Take 2,500 mcg by mouth once daily.      doxycycline (VIBRA-TABS) 100 MG tablet Take 1 tablet (100 mg total) by mouth 2 (two) times daily. 20 tablet 0    fluticasone propionate (FLONASE) 50 mcg/actuation nasal spray 1 spray (50 mcg total) by Each Nostril route once daily. 1 Bottle 0    furosemide (LASIX) 20 MG tablet Take 1 tablet (20 mg total) by mouth once daily. 90 tablet 0    gabapentin (NEURONTIN) 300 MG capsule Take 1 capsule (300 mg total) by mouth 2 (two) times daily. 60 capsule 11    melatonin 5 mg Tab Take 10 mg by mouth nightly.      metoprolol succinate (TOPROL-XL) 25 MG 24 hr tablet       multivitamin (MULTIPLE VITAMINS) per tablet Take 1 tablet by mouth once daily.      nitroGLYCERIN (NITROSTAT) 0.4 MG SL tablet Place 1 tablet (0.4 mg total) under the tongue every 5 (five) minutes as needed for Chest pain. 25 tablet 11    olmesartan (BENICAR) 20 MG tablet Take 1 tablet (20 mg total) by mouth once daily. (replaces ramipril for blood pressure) 90 tablet 1    omeprazole (PRILOSEC) 20 MG capsule Take 1 capsule (20 mg total) by mouth daily as needed. 90 capsule 0    oxybutynin (DITROPAN-XL) 5 MG TR24 Take 1 tablet (5 mg total) by mouth once daily. 30 tablet 11    oxyCODONE-acetaminophen (PERCOCET) 5-325 mg per tablet Take 1 tablet by mouth every 4 to 6 hours as needed for Pain. 20 tablet 0    tamsulosin (FLOMAX) 0.4 mg Cap Take 1 capsule (0.4 mg total) by mouth once daily. 90 capsule 3    venlafaxine (EFFEXOR) 75 MG tablet Take 2 tablets by mouth twice daily 360 tablet 0    vitamin D 1000 units Tab Take 1 tablet (1,000 Units total) by mouth once daily.       No current facility-administered medications on file prior to visit.        Allergies   Allergen Reactions    Ciprofloxacin Rash     Diffuse pruritic morbilliform rash developed 3/15/2017 after dose of cipro; previously in 2/2017  "he had rash/fevers after initiation of cipro    Zosyn [Piperacillin-Tazobactam] Anaphylaxis     Diffuse pruritic morbilliform rash developed 3/15/2017.  Then, 430am dose on 3/16 and rash worsened with SOB/tachypnea but no hypoxemia.     Bacitracin Itching and Rash     Violaceous rash in area of topical Tx.        Past Surgical History:   Procedure Laterality Date    Cardiac stenting x2      CATARACT EXTRACTION W/  INTRAOCULAR LENS IMPLANT Right 3/29/2016    Dr. Conteh    CATARACT EXTRACTION W/  INTRAOCULAR LENS IMPLANT Left 4/12/2016        EXTERNAL FIXATION TIBIAL FRACTURE Left 07/01/2016    INSERTION OF IMPLANTABLE LOOP RECORDER  04/07/2017    LEFT HEART CATHETERIZATION Left 1/30/2020    Procedure: Left heart cath;  Surgeon: Benjamin Sandhu MD;  Location: [facility] CATH LAB;  Service: Cardiology;  Laterality: Left;  RN PREOP 1/28/20--Pt starting Plavix loading dose today (8pills)- Dr. Sandhu aware.  Pt has a bandaged "non healing area to LLE"--Dr. Sandhu aware.    LEFT HEART CATHETERIZATION Left 2/14/2020    Procedure: Left heart cath, IVUS guided left main / LAD PCI. Noon start, radial approach;  Surgeon: Miguel Angel Brady MD;  Location: [facility] CATH LAB;  Service: Cardiology;  Laterality: Left;  RN PRE OP 2-7-2020  BMI--45.61    ORIF TIBIA FRACTURE Left 07/15/2016    RADIOACTIVE SEED IMPLANTATION OF PROSTATE N/A 8/8/2018    Procedure: INSERTION, RADIOACTIVE SEED, PROSTATE;  Surgeon: Bipin Thompson MD;  Location: [address];  Service: Urology;  Laterality: N/A;  1 hour    Squamous cell cancer removal x3 with Mohs surgery      TONSILLECTOMY      TOTAL KNEE ARTHROPLASTY  10/2012    trus/bx      ULTRASOUND GUIDANCE  2/14/2020    Procedure: Ultrasound Guidance;  Surgeon: Miguel Angel Brady MD;  Location: [facility] CATH LAB;  Service: Cardiology;;       Family History   Problem Relation Age of Onset    Skin cancer Father     Lung cancer Father     Cancer Father         smoker,     Alzheimer's " disease Mother     Hypertension Mother     Cancer Sister         colon, lung cancer     No Known Problems Sister     Cancer Brother         skin cancer, polypectomy     Peripheral vascular disease Unknown     No Known Problems Maternal Aunt     No Known Problems Maternal Uncle     No Known Problems Paternal Aunt     No Known Problems Paternal Uncle     No Known Problems Maternal Grandmother     No Known Problems Maternal Grandfather     No Known Problems Paternal Grandmother     No Known Problems Paternal Grandfather     Melanoma Neg Hx     Psoriasis Neg Hx     Lupus Neg Hx     Eczema Neg Hx     Amblyopia Neg Hx     Blindness Neg Hx     Cataracts Neg Hx     Diabetes Neg Hx     Glaucoma Neg Hx     Macular degeneration Neg Hx     Retinal detachment Neg Hx     Strabismus Neg Hx     Stroke Neg Hx     Thyroid disease Neg Hx     Acne Neg Hx        Social History     Socioeconomic History    Marital status:      Spouse name: Not on file    Number of children: Not on file    Years of education: Not on file    Highest education level: Not on file   Occupational History    Occupation: Retired    Social Needs    Financial resource strain: Not hard at all    Food insecurity:     Worry: Never true     Inability: Never true    Transportation needs:     Medical: No     Non-medical: No   Tobacco Use    Smoking status: Never Smoker    Smokeless tobacco: Never Used   Substance and Sexual Activity    Alcohol use: Yes     Frequency: 2-4 times a month     Drinks per session: 3 or 4     Binge frequency: Less than monthly     Comment: occasionally, beer    Drug use: Never    Sexual activity: Yes     Partners: Female   Lifestyle    Physical activity:     Days per week: Not on file     Minutes per session: Not on file    Stress: Not on file   Relationships    Social connections:     Talks on phone: Three times a week     Gets together: Once a week     Attends [employer] service: More than 4  "times per year     Active member of club or organization: Yes     Attends meetings of clubs or organizations: More than 4 times per year     Relationship status:    Other Topics Concern    Not on file   Social History Narrative    Not on file       Review of Systems   Constitution: Negative for chills and fever.   Cardiovascular: Negative for claudication and leg swelling.   Respiratory: Negative for cough and shortness of breath.    Skin: Positive for dry skin, itching (Left leg) and poor wound healing. Negative for rash.   Musculoskeletal: Positive for arthritis, joint pain, joint swelling and myalgias. Negative for falls and muscle weakness.   Gastrointestinal: Negative for diarrhea, nausea and vomiting.   Neurological: Positive for paresthesias. Negative for numbness, tremors and weakness.   Psychiatric/Behavioral: Negative for altered mental status and hallucinations.         Objective:       Vitals:    05/11/20 1503   Weight: (!) 152.9 kg (337 lb)   Height: 6' 1" (1.854 m)   PainSc: 0-No pain       Physical Exam   Constitutional:  Non-toxic appearance. He does not have a sickly appearance. No distress.   Pt. is well-developed, well-nourished, appears stated age, in no acute distress, alert and oriented x 3. No evidence of depression, anxiety, or agitation. Calm, cooperative, and communicative. Appropriate interactions and affect.   Cardiovascular:   Pulses:       Dorsalis pedis pulses are 2+ on the right side, and 2+ on the left side.        Posterior tibial pulses are 1+ on the right side, and 1+ on the left side.   There is decreased digital hair. Skin is atrophic, slightly hyperpigmented, and pitting edema nora (L>>R)     Pulmonary/Chest: No respiratory distress.   Musculoskeletal:        Right ankle: He exhibits swelling. No tenderness. No lateral malleolus, no medial malleolus, no AITFL, no CF ligament, no head of 5th metatarsal and no proximal fibula tenderness found. Achilles tendon exhibits no " pain, no defect and normal Zabala's test results.        Left ankle: He exhibits swelling. No tenderness. No lateral malleolus, no medial malleolus, no AITFL, no CF ligament and no posterior TFL tenderness found. Achilles tendon exhibits no pain, no defect and normal Zabala's test results.        Right foot: There is no tenderness and no bony tenderness.        Left foot: There is no tenderness and no bony tenderness.   Decreased stride, station of gait.  apropulsive toe off.  Increased angle and base of gait.    Patient has hammertoes of digits 2-5 bilateral partially reducible without symptom today.     There is equinus deformity bilateral. There is limitation of dorsiflexion with knees extended and with knees flexed.    Shoes reveals lateral heel counter wear bilateral in athletic shoes.        Lymphadenopathy:   No lymphatic streaking    Negative lymphadenopathy bilateral popliteal fossa and tarsal tunnel.     Neurological:   [place] 5.07 monofilament is intact bilateral feet. Sharp/dull sensation is also intact Bilateral feet. Proprioception is grossly intact. Vibratory sensation intact (pt able to sense vibration stop within 3-5 seconds)     Skin: Skin is warm and dry. Lesion noted. No bruising, no burn, no laceration and no rash noted. He is not diaphoretic. No cyanosis. No pallor. Nails show no clubbing.   Redness and weeping noted to the left lower leg    Ulcer location: posterior left heel  Signs of infection:none  Drainage:  None  Periwound: intact  Base: thin epithelial with bleeding within layers    Ulcer location: left anterior leg as pictured  Signs of infection:  Local edema and erythema  Drainage: Sero-Sanguinous  Purulence: no  Crepitus/fluctuance: no  Periwound: Reddened  Base: Granular/Healthy  Depth: subcutaneous tissue  Probe to bone: no   Psychiatric: His mood appears not anxious. His affect is not inappropriate. His speech is not slurred. He is not combative. He is communicative.  He is attentive.   Nursing note reviewed.      05/11/2020 4/27/2020 04/13/2020 03/23/2020 03/16/2020 03/09/2020 03/02/2020 02/24/20 02/13/2020 02/06/20 1/31/20 01/21/20 01/14/20 01/07/20                Assessment:       Encounter Diagnoses   Name Primary?    PVD (peripheral vascular disease) Yes    Venous stasis ulcer of left lower leg with edema of left lower leg          Plan:       Janusz was seen today for wound care and wound check.    Diagnoses and all orders for this visit:    PVD (peripheral vascular disease)    Venous stasis ulcer of left lower leg with edema of left lower leg      I counseled the patient on his conditions, their implications and medical management.    The site is cleansed of foreign material as much as possible. The patient is alerted to watch for any signs of infection (redness, pus, pain, increased swelling or fever) and call if such occurs.    Wound has improved.    Fabienne applied to wound bed.   Calamine Coflex for compression to the leg    Patient is to elevate legs. When sleeping, place a pillow under lower extremities. When sitting, support the legs so that they are level with the waist.      Follow-up:Patient is to return to the clinic in 2-4 weeks for follow-up but should call [facility] immediately if any signs of infection, such as fever, chills, sweats, increased redness or pain.    Short-term goals include maintaining good offloading and minimizing bioburden, promoting granulation and epithelialization to healing.  Long-term goals include keeping the wound healed by good offloading and medical management under the direction of internist.

## 2020-05-22 ENCOUNTER — PATIENT OUTREACH (OUTPATIENT)
Dept: ADMINISTRATIVE | Facility: OTHER | Age: 75
End: 2020-05-22

## 2020-05-23 DIAGNOSIS — R60.9 EDEMA, UNSPECIFIED TYPE: ICD-10-CM

## 2020-05-25 ENCOUNTER — OFFICE VISIT (OUTPATIENT)
Dept: PODIATRY | Facility: CLINIC | Age: 75
End: 2020-05-25
Payer: MEDICARE

## 2020-05-25 VITALS
SYSTOLIC BLOOD PRESSURE: 116 MMHG | HEIGHT: 73 IN | DIASTOLIC BLOOD PRESSURE: 66 MMHG | BODY MASS INDEX: 41.75 KG/M2 | WEIGHT: 315 LBS

## 2020-05-25 DIAGNOSIS — L97.929 VENOUS STASIS ULCER OF LEFT LOWER LEG WITH EDEMA OF LEFT LOWER LEG: ICD-10-CM

## 2020-05-25 DIAGNOSIS — I83.029 VENOUS STASIS ULCER OF LEFT LOWER LEG WITH EDEMA OF LEFT LOWER LEG: ICD-10-CM

## 2020-05-25 DIAGNOSIS — R60.0 VENOUS STASIS ULCER OF LEFT LOWER LEG WITH EDEMA OF LEFT LOWER LEG: ICD-10-CM

## 2020-05-25 DIAGNOSIS — I83.892 VENOUS STASIS ULCER OF LEFT LOWER LEG WITH EDEMA OF LEFT LOWER LEG: ICD-10-CM

## 2020-05-25 DIAGNOSIS — I73.9 PVD (PERIPHERAL VASCULAR DISEASE): Primary | ICD-10-CM

## 2020-05-25 PROCEDURE — 99999 PR PBB SHADOW E&M-EST. PATIENT-LVL III: ICD-10-PCS | Mod: PBBFAC,HCNC,, | Performed by: PODIATRIST

## 2020-05-25 PROCEDURE — 1159F PR MEDICATION LIST DOCUMENTED IN MEDICAL RECORD: ICD-10-PCS | Mod: HCNC,S$GLB,, | Performed by: PODIATRIST

## 2020-05-25 PROCEDURE — 3074F SYST BP LT 130 MM HG: CPT | Mod: HCNC,CPTII,S$GLB, | Performed by: PODIATRIST

## 2020-05-25 PROCEDURE — 1101F PT FALLS ASSESS-DOCD LE1/YR: CPT | Mod: HCNC,CPTII,S$GLB, | Performed by: PODIATRIST

## 2020-05-25 PROCEDURE — 1159F MED LIST DOCD IN RCRD: CPT | Mod: HCNC,S$GLB,, | Performed by: PODIATRIST

## 2020-05-25 PROCEDURE — 99213 OFFICE O/P EST LOW 20 MIN: CPT | Mod: HCNC,S$GLB,, | Performed by: PODIATRIST

## 2020-05-25 PROCEDURE — 99999 PR PBB SHADOW E&M-EST. PATIENT-LVL III: CPT | Mod: PBBFAC,HCNC,, | Performed by: PODIATRIST

## 2020-05-25 PROCEDURE — 3074F PR MOST RECENT SYSTOLIC BLOOD PRESSURE < 130 MM HG: ICD-10-PCS | Mod: HCNC,CPTII,S$GLB, | Performed by: PODIATRIST

## 2020-05-25 PROCEDURE — 3078F DIAST BP <80 MM HG: CPT | Mod: HCNC,CPTII,S$GLB, | Performed by: PODIATRIST

## 2020-05-25 PROCEDURE — 1101F PR PT FALLS ASSESS DOC 0-1 FALLS W/OUT INJ PAST YR: ICD-10-PCS | Mod: HCNC,CPTII,S$GLB, | Performed by: PODIATRIST

## 2020-05-25 PROCEDURE — 99213 PR OFFICE/OUTPT VISIT, EST, LEVL III, 20-29 MIN: ICD-10-PCS | Mod: HCNC,S$GLB,, | Performed by: PODIATRIST

## 2020-05-25 PROCEDURE — 1126F AMNT PAIN NOTED NONE PRSNT: CPT | Mod: HCNC,S$GLB,, | Performed by: PODIATRIST

## 2020-05-25 PROCEDURE — 1126F PR PAIN SEVERITY QUANTIFIED, NO PAIN PRESENT: ICD-10-PCS | Mod: HCNC,S$GLB,, | Performed by: PODIATRIST

## 2020-05-25 PROCEDURE — 3078F PR MOST RECENT DIASTOLIC BLOOD PRESSURE < 80 MM HG: ICD-10-PCS | Mod: HCNC,CPTII,S$GLB, | Performed by: PODIATRIST

## 2020-05-25 RX ORDER — FUROSEMIDE 20 MG/1
TABLET ORAL
Qty: 90 TABLET | Refills: 0 | Status: ON HOLD | OUTPATIENT
Start: 2020-05-25 | End: 2020-08-27 | Stop reason: HOSPADM

## 2020-05-25 NOTE — PROGRESS NOTES
Subjective:      Patient ID: Janusz Montgomery Jr. is a 75 y.o. male.    Chief Complaint: Wound Check (left leg Pcp Dr. Weathers 2/17/2020) and Wound Care      Janusz Montgomery Jr. is a 75 y.o. malewho presents to the clinic for evaluation and treatment of high risk feet. Janusz Montgomery Jr.   has a past medical history of NAT (acute kidney injury) (3/19/2017), ALLERGIC RHINITIS, Anemia, Anxiety, Basal cell carcinoma (10/19/2018), Basal cell carcinoma (01/09/2019), Chronic rhinitis (5/3/2013), Chronic rhinitis (5/3/2013), Coronary artery disease involving native coronary artery of native heart without angina pectoris s/p RCA stent, Cortical cataract of both eyes (3/18/2016), Delayed sleep phase syndrome (3/13/2019), Depression, Erectile dysfunction (3/24/2014), Erectile dysfunction (3/24/2014), Essential hypertension, GERD (gastroesophageal reflux disease) (7/25/2012), Gout, arthritis, Grade III open fracture of left tibia and fibula s/p ex-fix on 7/1/16 and ORIF of left tibia on 7/15 (7/6/2016), H/O: iritis, Helicobacter pylori (H. 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  The patient's chief complaint is an anterior left leg wound.  One month duration.  Secondary to mild trauma in the presence of edema.  Has attempted self treatment but wound has not healed.  Non painful. Denies nausea, vomiting, fever, chills. History of left foot and ankle ulcers.  This patient has documented high risk feet requiring routine maintenance secondary to peripheral vascular disease.    12/10/2019  Patient relates he has attempted to care for leg ulcer as instructed but wound dimensions have not improved.    12/19/2019 patient relates that he has kept dressing clean dry and intact without itching or discomfort.  He has begun taking antibiotics as prescribed without nausea, vomiting, diarrhea, fever, chills.  No new pedal complaints.    12/31/19: F/u left leg ulceration. Has been in unna boot without issue.     01/07/2020 Patient relates that he has kept dressing clean dry and intact without itching or discomfort. He relates increased callus to posterior heel.  He denies nausea, vomiting, diarrhea, fever, chills.  No new pedal complaints.    01/14/2020  Patient relates that he has kept dressing clean dry and intact without itching however he relates that dressing was slipping and therefore somewhat uncomfortable this week.   He denies nausea, vomiting, diarrhea, fever, chills.  No new pedal complaints.    01/21/2020 patient relates that he has kept dressing clean, dry, intact.  He has no pedal complaints.  He does eyes nausea, vomiting, fever, chills.  He is anxious to get out of Unna boot.    1/29/20: F/u left leg ulceration. Has been wearing tubi . Pt. Is concerned regarding wound appearance.     02/06/2020 patient relates that he has kept dressing clean, dry, intact.  He has no pedal complaints.  He does eyes nausea, vomiting, fever, chills.  He relates that he has a cardiovascular procedure scheduled next week.     02/13/2020 patient relates that he has kept dressing  clean, dry, intact.  He has no pedal complaints.  He denies nausea, vomiting, fever, chills.      02/24/2020 patient relates that he has kept dressing clean, dry, and intact.  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 He denies nausea, vomiting, fever, chills.      03/02/2020 patient relates that he has kept dressing clean, dry, and intact.   He denies nausea, vomiting, fever, chills.        03/16/2020 patient relates that he has kept dressing clean, dry, and intact.   He denies nausea, vomiting, fever, chills.      03/23/2020 patient relates that he was able to keep dressing clean, dry, intact until today.  Relates that his leg and foot got wet in the shower.  He states that he has been elevating the leg to that of his ability and taking his diuretics as prescribed however he still feels as if he is retaining fluid.  Patient denies nausea, vomiting, fever, chills.  No new pedal complaints.     04/13/2020 for the last 2 weeks patient has been caring for the wound at home with supplies that he has from previous wounds.  He has been using a Tubigrip for compression.  He relates that for the last several days he has had increase in itching and increased redness to the left leg.  Patient denies nausea, vomiting, fever, chills.    04/27/2020  patient relates that he has kept dressing clean, dry, and intact.   He denies nausea, vomiting, fever, chills.     05/11/2020 patient relates that he has kept dressing clean, dry, and intact.   He denies nausea, vomiting, fever, chills.     05/25/2020 patient relates that he has kept dressing clean, dry, and intact.   He denies nausea, vomiting, fever, chills.     PCP: Miguelina Weathers MD    Date Last Seen by PCP:   Chief Complaint   Patient presents with    Wound Check     left  leg Pcp Dr. Weathers 2/17/2020    Wound Care     Current shoe gear: tennis shoe    Hemoglobin A1C   Date Value Ref Range Status   02/11/2020 5.6 4.0 - 5.6 % Final     Comment:     ADA Screening Guidelines:  5.7-6.4%  Consistent with prediabetes  >or=6.5%  Consistent with diabetes  High levels of fetal hemoglobin interfere with the HbA1C  assay. Heterozygous hemoglobin variants (HbS, HgC, etc)do  not significantly interfere with this assay.   However, presence of multiple variants may affect accuracy.     07/11/2016 5.3 4.5 - 6.2 % Final     Comment:     According to ADA guidelines, hemoglobin A1C <7.0% represents  optimal control in non-pregnant diabetic patients.  Different  metrics may apply to specific populations.   Standards of Medical Care in Diabetes - 2016.  For the purpose of screening for the presence of diabetes:  <5.7%     Consistent with the absence of diabetes  5.7-6.4%  Consistent with increasing risk for diabetes   (prediabetes)  >or=6.5%  Consistent with diabetes  Currently no consensus exists for use of hemoglobin A1C  for diagnosis of diabetes for children.     03/31/2015 5.3 4.5 - 6.2 % Final         Current Outpatient Medications on File Prior to Visit   Medication Sig Dispense Refill    acyclovir (ZOVIRAX) 800 MG Tab Take 1 tablet (800 mg total) by mouth 2 (two) times daily. 180 tablet 6    albuterol (PROVENTIL/VENTOLIN HFA) 90 mcg/actuation inhaler Inhale 2 puffs into the lungs every 6 (six) hours as needed for Wheezing. Rescue 18 g 0    apixaban (ELIQUIS) 5 mg Tab Take 1 tablet (5 mg total) by mouth 2 (two) times daily. 180 tablet 4    artificial tears (ISOPTO TEARS) 0.5 % ophthalmic solution Place 1 drop into both eyes as needed. (Patient taking differently: Place 1 drop into both eyes as needed (for dry eyes). )      atorvastatin (LIPITOR) 40 MG tablet Take 1 tablet (40 mg total) by mouth once daily. Please make a follow up visit with your doctor for more refills of this medication 90  tablet 0    azelastine (ASTELIN) 137 mcg (0.1 %) nasal spray 1 spray (137 mcg total) by Nasal route 2 (two) times daily. 30 mL 2    clopidogreL (PLAVIX) 75 mg tablet Take 1 tablet (75 mg total) by mouth once daily. 90 tablet 3    CYANOCOBALAMIN, VITAMIN B-12, (VITAMIN B-12 ORAL) Take 2,500 mcg by mouth once daily.      doxycycline (VIBRA-TABS) 100 MG tablet Take 1 tablet (100 mg total) by mouth 2 (two) times daily. 20 tablet 0    fluticasone propionate (FLONASE) 50 mcg/actuation nasal spray 1 spray (50 mcg total) by Each Nostril route once daily. 1 Bottle 0    furosemide (LASIX) 20 MG tablet Take 1 tablet by mouth once daily 90 tablet 0    gabapentin (NEURONTIN) 300 MG capsule Take 1 capsule (300 mg total) by mouth 2 (two) times daily. 60 capsule 11    melatonin 5 mg Tab Take 10 mg by mouth nightly.      metoprolol succinate (TOPROL-XL) 25 MG 24 hr tablet       multivitamin (MULTIPLE VITAMINS) per tablet Take 1 tablet by mouth once daily.      nitroGLYCERIN (NITROSTAT) 0.4 MG SL tablet Place 1 tablet (0.4 mg total) under the tongue every 5 (five) minutes as needed for Chest pain. 25 tablet 11    olmesartan (BENICAR) 20 MG tablet Take 1 tablet (20 mg total) by mouth once daily. (replaces ramipril for blood pressure) 90 tablet 1    omeprazole (PRILOSEC) 20 MG capsule Take 1 capsule (20 mg total) by mouth daily as needed. 90 capsule 0    oxybutynin (DITROPAN-XL) 5 MG TR24 Take 1 tablet (5 mg total) by mouth once daily. 30 tablet 11    oxyCODONE-acetaminophen (PERCOCET) 5-325 mg per tablet Take 1 tablet by mouth every 4 to 6 hours as needed for Pain. 20 tablet 0    tamsulosin (FLOMAX) 0.4 mg Cap Take 1 capsule (0.4 mg total) by mouth once daily. 90 capsule 3    venlafaxine (EFFEXOR) 75 MG tablet Take 2 tablets by mouth twice daily 360 tablet 0    vitamin D 1000 units Tab Take 1 tablet (1,000 Units total) by mouth once daily.      [DISCONTINUED] furosemide (LASIX) 20 MG tablet Take 1 tablet (20 mg  "total) by mouth once daily. 90 tablet 0     No current facility-administered medications on file prior to visit.        Allergies   Allergen Reactions    Ciprofloxacin Rash     Diffuse pruritic morbilliform rash developed 3/15/2017 after dose of cipro; previously in 2/2017 he had rash/fevers after initiation of cipro    Zosyn [Piperacillin-Tazobactam] Anaphylaxis     Diffuse pruritic morbilliform rash developed 3/15/2017.  Then, 430am dose on 3/16 and rash worsened with SOB/tachypnea but no hypoxemia.     Bacitracin Itching and Rash     Violaceous rash in area of topical Tx.        Past Surgical History:   Procedure Laterality Date    Cardiac stenting x2      CATARACT EXTRACTION W/  INTRAOCULAR LENS IMPLANT Right 3/29/2016    Dr. Conteh    CATARACT EXTRACTION W/  INTRAOCULAR LENS IMPLANT Left 4/12/2016        EXTERNAL FIXATION TIBIAL FRACTURE Left 07/01/2016    INSERTION OF IMPLANTABLE LOOP RECORDER  04/07/2017    LEFT HEART CATHETERIZATION Left 1/30/2020    Procedure: Left heart cath;  Surgeon: Benjamin Sandhu MD;  Location: [facility] CATH LAB;  Service: Cardiology;  Laterality: Left;  RN PREOP 1/28/20--Pt starting Plavix loading dose today (8pills)- Dr. Sandhu aware.  Pt has a bandaged "non healing area to LLE"--Dr. Sandhu aware.    LEFT HEART CATHETERIZATION Left 2/14/2020    Procedure: Left heart cath, IVUS guided left main / LAD PCI. Noon start, radial approach;  Surgeon: Miguel Angel Brady MD;  Location: [facility] CATH LAB;  Service: Cardiology;  Laterality: Left;  RN PRE OP 2-7-2020  BMI--45.61    ORIF TIBIA FRACTURE Left 07/15/2016    RADIOACTIVE SEED IMPLANTATION OF PROSTATE N/A 8/8/2018    Procedure: INSERTION, RADIOACTIVE SEED, PROSTATE;  Surgeon: Bipin Thompson MD;  Location: [facility] OR [address] [place] [address];  Service: Urology;  Laterality: N/A;  1 hour    Squamous cell cancer removal x3 with Mohs surgery      TONSILLECTOMY      TOTAL KNEE ARTHROPLASTY  10/2012    trus/bx      ULTRASOUND GUIDANCE  " 2/14/2020    Procedure: Ultrasound Guidance;  Surgeon: Miguel Angel Brady MD;  Location: [facility] CATH LAB;  Service: Cardiology;;       Family History   Problem Relation Age of Onset    Skin cancer Father     Lung cancer Father     Cancer Father         smoker,     Alzheimer's disease Mother     Hypertension Mother     Cancer Sister         colon, lung cancer     No Known Problems Sister     Cancer Brother         skin cancer, polypectomy     Peripheral vascular disease Unknown     No Known Problems Maternal Aunt     No Known Problems Maternal Uncle     No Known Problems Paternal Aunt     No Known Problems Paternal Uncle     No Known Problems Maternal Grandmother     No Known Problems Maternal Grandfather     No Known Problems Paternal Grandmother     No Known Problems Paternal Grandfather     Melanoma Neg Hx     Psoriasis Neg Hx     Lupus Neg Hx     Eczema Neg Hx     Amblyopia Neg Hx     Blindness Neg Hx     Cataracts Neg Hx     Diabetes Neg Hx     Glaucoma Neg Hx     Macular degeneration Neg Hx     Retinal detachment Neg Hx     Strabismus Neg Hx     Stroke Neg Hx     Thyroid disease Neg Hx     Acne Neg Hx        Social History     Socioeconomic History    Marital status:      Spouse name: Not on file    Number of children: Not on file    Years of education: Not on file    Highest education level: Not on file   Occupational History    Occupation: Retired    Social Needs    Financial resource strain: Not hard at all    Food insecurity:     Worry: Never true     Inability: Never true    Transportation needs:     Medical: No     Non-medical: No   Tobacco Use    Smoking status: Never Smoker    Smokeless tobacco: Never Used   Substance and Sexual Activity    Alcohol use: Yes     Frequency: 2-4 times a month     Drinks per session: 3 or 4     Binge frequency: Less than monthly     Comment: occasionally, beer    Drug use: Never    Sexual activity: Yes     Partners: Female  "  Lifestyle    Physical activity:     Days per week: Not on file     Minutes per session: Not on file    Stress: Not on file   Relationships    Social connections:     Talks on phone: Three times a week     Gets together: Once a week     Attends [employer] service: More than 4 times per year     Active member of club or organization: Yes     Attends meetings of clubs or organizations: More than 4 times per year     Relationship status:    Other Topics Concern    Not on file   Social History Narrative    Not on file       Review of Systems   Constitution: Negative for chills and fever.   Cardiovascular: Negative for claudication and leg swelling.   Respiratory: Negative for cough and shortness of breath.    Skin: Positive for dry skin, itching (Left leg) and poor wound healing. Negative for rash.   Musculoskeletal: Positive for arthritis, joint pain, joint swelling and myalgias. Negative for falls and muscle weakness.   Gastrointestinal: Negative for diarrhea, nausea and vomiting.   Neurological: Positive for paresthesias. Negative for numbness, tremors and weakness.   Psychiatric/Behavioral: Negative for altered mental status and hallucinations.         Objective:       Vitals:    05/25/20 1415   BP: 116/66   Weight: (!) 152.9 kg (337 lb)   Height: 6' 1" (1.854 m)   PainSc: 0-No pain       Physical Exam   Constitutional:  Non-toxic appearance. He does not have a sickly appearance. No distress.   Pt. is well-developed, well-nourished, appears stated age, in no acute distress, alert and oriented x 3. No evidence of depression, anxiety, or agitation. Calm, cooperative, and communicative. Appropriate interactions and affect.   Cardiovascular:   Pulses:       Dorsalis pedis pulses are 2+ on the right side, and 2+ on the left side.        Posterior tibial pulses are 1+ on the right side, and 1+ on the left side.   There is decreased digital hair. Skin is atrophic, slightly hyperpigmented, and pitting edema nora " (L>>R)     Pulmonary/Chest: No respiratory distress.   Musculoskeletal:        Right ankle: He exhibits swelling. No tenderness. No lateral malleolus, no medial malleolus, no AITFL, no CF ligament, no head of 5th metatarsal and no proximal fibula tenderness found. Achilles tendon exhibits no pain, no defect and normal Zabala's test results.        Left ankle: He exhibits swelling. No tenderness. No lateral malleolus, no medial malleolus, no AITFL, no CF ligament and no posterior TFL tenderness found. Achilles tendon exhibits no pain, no defect and normal Zabala's test results.        Right foot: There is no tenderness and no bony tenderness.        Left foot: There is no tenderness and no bony tenderness.   Decreased stride, station of gait.  apropulsive toe off.  Increased angle and base of gait.    Patient has hammertoes of digits 2-5 bilateral partially reducible without symptom today.     There is equinus deformity bilateral. There is limitation of dorsiflexion with knees extended and with knees flexed.    Shoes reveals lateral heel counter wear bilateral in athletic shoes.        Lymphadenopathy:   No lymphatic streaking    Negative lymphadenopathy bilateral popliteal fossa and tarsal tunnel.     Neurological:   [place] 5.07 monofilament is intact bilateral feet. Sharp/dull sensation is also intact Bilateral feet. Proprioception is grossly intact. Vibratory sensation intact (pt able to sense vibration stop within 3-5 seconds)     Skin: Skin is warm and dry. Lesion noted. No bruising, no burn, no laceration and no rash noted. He is not diaphoretic. No cyanosis. No pallor. Nails show no clubbing.   Redness and weeping noted to the left lower leg    Ulcer location: posterior left heel  Signs of infection:none  Drainage:  None  Periwound: intact  Base: thin epithelial with bleeding within layers    Ulcer location: left anterior leg as pictured  Signs of infection:  Local edema and erythema  Drainage:  Sero-Sanguinous  Purulence: no  Crepitus/fluctuance: no  Periwound: Reddened  Base: Granular/Healthy  Depth: subcutaneous tissue  Probe to bone: no   Psychiatric: His mood appears not anxious. His affect is not inappropriate. His speech is not slurred. He is not combative. He is communicative. He is attentive.   Nursing note reviewed.      05/25/2020 05/11/2020 4/27/2020 04/13/2020 03/23/2020 03/16/2020 03/09/2020 03/02/2020 02/24/20 02/13/2020 02/06/20 1/31/20 01/21/20 01/14/20 01/07/20                Assessment:       Encounter Diagnoses   Name Primary?    PVD (peripheral vascular disease) Yes    Venous stasis ulcer of left lower leg with edema of left lower leg          Plan:       Janusz was seen today for wound check and wound care.    Diagnoses and all orders for this visit:    PVD (peripheral vascular disease)    Venous stasis ulcer of left lower leg with edema of left lower leg      I counseled the patient on his conditions, their implications and medical management.    The site is cleansed of foreign material as much as possible. The patient is alerted to watch for any signs of infection (redness, pus, pain, increased swelling or fever) and call if such occurs.    Wound is stagnant     Iodosorb applied to wound bed.   Calamine Coflex for compression to the leg    Staff assisted in having patient seen by Dermatology for potential biopsy.    Patient is to elevate legs. When sleeping, place a pillow under lower extremities. When sitting, support the legs so that they are level with the waist.    Follow-up:Patient is to return to the clinic in 2-4 weeks for follow-up but [facility] if any signs of infection, such as fever, chills, sweats, increased redness or pain.    Short-term goals include maintaining good offloading and minimizing  bioburden, promoting granulation and epithelialization to healing.  Long-term goals include keeping the wound healed by good offloading and medical management under the direction of internist.

## 2020-05-28 NOTE — PROGRESS NOTES
Digital Medicine: Health  Follow-Up    Patient is doing good at this time and does not have any questions concerning BP readings, medication or lifestyle routine. Patient agreed to submit BP reading today.    Patient reports drinking plenty of water.  Patient is interested in adjusting his diet and exercise routine and found the previous resources that were sent to him very helpful. Patient requests more educational resources on diet and exercise. Will e-mail resources and agreed to f/u in 2-3 weeks to discuss and set goals.     The history is provided by the patient.     Follow Up  Follow-up reason(s): reading review          Intervention/Plan    There are no preventive care reminders to display for this patient.    Last 5 Patient Entered Readings                                      Current 30 Day Average: 113/62     Recent Readings 5/19/2020 5/17/2020 5/17/2020 5/12/2020 5/12/2020    SBP (mmHg) 134 135 146 76 98    DBP (mmHg) 72 51 60 46 56    Pulse 83 66 71 72 67

## 2020-05-29 ENCOUNTER — PATIENT OUTREACH (OUTPATIENT)
Dept: ADMINISTRATIVE | Facility: OTHER | Age: 75
End: 2020-05-29

## 2020-06-01 ENCOUNTER — OFFICE VISIT (OUTPATIENT)
Dept: PODIATRY | Facility: CLINIC | Age: 75
End: 2020-06-01
Payer: MEDICARE

## 2020-06-01 ENCOUNTER — OFFICE VISIT (OUTPATIENT)
Dept: OTOLARYNGOLOGY | Facility: CLINIC | Age: 75
End: 2020-06-01
Payer: MEDICARE

## 2020-06-01 VITALS
DIASTOLIC BLOOD PRESSURE: 66 MMHG | BODY MASS INDEX: 44.46 KG/M2 | WEIGHT: 315 LBS | SYSTOLIC BLOOD PRESSURE: 119 MMHG | HEART RATE: 67 BPM

## 2020-06-01 VITALS
HEIGHT: 73 IN | DIASTOLIC BLOOD PRESSURE: 82 MMHG | SYSTOLIC BLOOD PRESSURE: 146 MMHG | BODY MASS INDEX: 41.75 KG/M2 | WEIGHT: 315 LBS

## 2020-06-01 DIAGNOSIS — Z97.4 WEARS HEARING AID IN RIGHT EAR: ICD-10-CM

## 2020-06-01 DIAGNOSIS — I83.029 VENOUS STASIS ULCER OF LEFT LOWER LEG WITH EDEMA OF LEFT LOWER LEG: Primary | ICD-10-CM

## 2020-06-01 DIAGNOSIS — I73.9 PVD (PERIPHERAL VASCULAR DISEASE): ICD-10-CM

## 2020-06-01 DIAGNOSIS — R60.0 VENOUS STASIS ULCER OF LEFT LOWER LEG WITH EDEMA OF LEFT LOWER LEG: Primary | ICD-10-CM

## 2020-06-01 DIAGNOSIS — I83.892 VENOUS STASIS ULCER OF LEFT LOWER LEG WITH EDEMA OF LEFT LOWER LEG: Primary | ICD-10-CM

## 2020-06-01 DIAGNOSIS — H92.01 EAR DISCOMFORT, RIGHT: Primary | ICD-10-CM

## 2020-06-01 DIAGNOSIS — H60.391 OTHER INFECTIVE OTITIS EXTERNA OF RIGHT EAR, UNSPECIFIED CHRONICITY: ICD-10-CM

## 2020-06-01 DIAGNOSIS — L97.929 VENOUS STASIS ULCER OF LEFT LOWER LEG WITH EDEMA OF LEFT LOWER LEG: Primary | ICD-10-CM

## 2020-06-01 PROCEDURE — 1101F PT FALLS ASSESS-DOCD LE1/YR: CPT | Mod: HCNC,CPTII,S$GLB, | Performed by: OTOLARYNGOLOGY

## 2020-06-01 PROCEDURE — 1101F PT FALLS ASSESS-DOCD LE1/YR: CPT | Mod: HCNC,CPTII,S$GLB, | Performed by: PODIATRIST

## 2020-06-01 PROCEDURE — 99212 PR OFFICE/OUTPT VISIT, EST, LEVL II, 10-19 MIN: ICD-10-PCS | Mod: 25,HCNC,S$GLB, | Performed by: OTOLARYNGOLOGY

## 2020-06-01 PROCEDURE — 3074F PR MOST RECENT SYSTOLIC BLOOD PRESSURE < 130 MM HG: ICD-10-PCS | Mod: HCNC,CPTII,S$GLB, | Performed by: PODIATRIST

## 2020-06-01 PROCEDURE — 3074F SYST BP LT 130 MM HG: CPT | Mod: HCNC,CPTII,S$GLB, | Performed by: PODIATRIST

## 2020-06-01 PROCEDURE — 99212 OFFICE O/P EST SF 10 MIN: CPT | Mod: 25,HCNC,S$GLB, | Performed by: OTOLARYNGOLOGY

## 2020-06-01 PROCEDURE — 3079F PR MOST RECENT DIASTOLIC BLOOD PRESSURE 80-89 MM HG: ICD-10-PCS | Mod: HCNC,CPTII,S$GLB, | Performed by: PODIATRIST

## 2020-06-01 PROCEDURE — 99999 PR PBB SHADOW E&M-EST. PATIENT-LVL IV: ICD-10-PCS | Mod: PBBFAC,HCNC,, | Performed by: OTOLARYNGOLOGY

## 2020-06-01 PROCEDURE — 1126F AMNT PAIN NOTED NONE PRSNT: CPT | Mod: HCNC,S$GLB,, | Performed by: PODIATRIST

## 2020-06-01 PROCEDURE — 1159F MED LIST DOCD IN RCRD: CPT | Mod: HCNC,S$GLB,, | Performed by: OTOLARYNGOLOGY

## 2020-06-01 PROCEDURE — 99214 OFFICE O/P EST MOD 30 MIN: CPT | Mod: HCNC,S$GLB,, | Performed by: PODIATRIST

## 2020-06-01 PROCEDURE — 3074F PR MOST RECENT SYSTOLIC BLOOD PRESSURE < 130 MM HG: ICD-10-PCS | Mod: HCNC,CPTII,S$GLB, | Performed by: OTOLARYNGOLOGY

## 2020-06-01 PROCEDURE — 3078F DIAST BP <80 MM HG: CPT | Mod: HCNC,CPTII,S$GLB, | Performed by: OTOLARYNGOLOGY

## 2020-06-01 PROCEDURE — 3074F SYST BP LT 130 MM HG: CPT | Mod: HCNC,CPTII,S$GLB, | Performed by: OTOLARYNGOLOGY

## 2020-06-01 PROCEDURE — 3079F DIAST BP 80-89 MM HG: CPT | Mod: HCNC,CPTII,S$GLB, | Performed by: PODIATRIST

## 2020-06-01 PROCEDURE — 69210 PR REMOVAL IMPACTED CERUMEN REQUIRING INSTRUMENTATION, UNILATERAL: ICD-10-PCS | Mod: HCNC,S$GLB,, | Performed by: OTOLARYNGOLOGY

## 2020-06-01 PROCEDURE — 69210 REMOVE IMPACTED EAR WAX UNI: CPT | Mod: HCNC,S$GLB,, | Performed by: OTOLARYNGOLOGY

## 2020-06-01 PROCEDURE — 3078F PR MOST RECENT DIASTOLIC BLOOD PRESSURE < 80 MM HG: ICD-10-PCS | Mod: HCNC,CPTII,S$GLB, | Performed by: OTOLARYNGOLOGY

## 2020-06-01 PROCEDURE — 1101F PR PT FALLS ASSESS DOC 0-1 FALLS W/OUT INJ PAST YR: ICD-10-PCS | Mod: HCNC,CPTII,S$GLB, | Performed by: PODIATRIST

## 2020-06-01 PROCEDURE — 1126F PR PAIN SEVERITY QUANTIFIED, NO PAIN PRESENT: ICD-10-PCS | Mod: HCNC,S$GLB,, | Performed by: PODIATRIST

## 2020-06-01 PROCEDURE — 1159F PR MEDICATION LIST DOCUMENTED IN MEDICAL RECORD: ICD-10-PCS | Mod: HCNC,S$GLB,, | Performed by: OTOLARYNGOLOGY

## 2020-06-01 PROCEDURE — 1101F PR PT FALLS ASSESS DOC 0-1 FALLS W/OUT INJ PAST YR: ICD-10-PCS | Mod: HCNC,CPTII,S$GLB, | Performed by: OTOLARYNGOLOGY

## 2020-06-01 PROCEDURE — 99999 PR PBB SHADOW E&M-EST. PATIENT-LVL IV: CPT | Mod: PBBFAC,HCNC,, | Performed by: OTOLARYNGOLOGY

## 2020-06-01 PROCEDURE — 99999 PR PBB SHADOW E&M-EST. PATIENT-LVL III: ICD-10-PCS | Mod: PBBFAC,HCNC,, | Performed by: PODIATRIST

## 2020-06-01 PROCEDURE — 1159F MED LIST DOCD IN RCRD: CPT | Mod: HCNC,S$GLB,, | Performed by: PODIATRIST

## 2020-06-01 PROCEDURE — 1159F PR MEDICATION LIST DOCUMENTED IN MEDICAL RECORD: ICD-10-PCS | Mod: HCNC,S$GLB,, | Performed by: PODIATRIST

## 2020-06-01 PROCEDURE — 99999 PR PBB SHADOW E&M-EST. PATIENT-LVL III: CPT | Mod: PBBFAC,HCNC,, | Performed by: PODIATRIST

## 2020-06-01 PROCEDURE — 99214 PR OFFICE/OUTPT VISIT, EST, LEVL IV, 30-39 MIN: ICD-10-PCS | Mod: HCNC,S$GLB,, | Performed by: PODIATRIST

## 2020-06-01 NOTE — PROGRESS NOTES
Subjective:      Patient ID: Janusz Montgomery Jr. is a 75 y.o. male.    Chief Complaint: Wound Check (Pcp Dr. Weathers 2/17/2020)      Janusz Montgomery Jr. is a 75 y.o. malewho presents to the clinic for evaluation and treatment of high risk feet. Janusz Montgomery Jr.   has a past medical history of NAT (acute kidney injury) (3/19/2017), ALLERGIC RHINITIS, Anemia, Anxiety, Basal cell carcinoma (10/19/2018), Basal cell carcinoma (01/09/2019), Chronic rhinitis (5/3/2013), Chronic rhinitis (5/3/2013), Coronary artery disease involving native coronary artery of native heart without angina pectoris s/p RCA stent, Cortical cataract of both eyes (3/18/2016), Delayed sleep phase syndrome (3/13/2019), Depression, Erectile dysfunction (3/24/2014), Erectile dysfunction (3/24/2014), Essential hypertension, GERD (gastroesophageal reflux disease) (7/25/2012), Gout, arthritis, Grade III open fracture of left tibia and fibula s/p ex-fix on 7/1/16 and ORIF of left tibia on 7/15 (7/6/2016), H/O: iritis, Helicobacter pylori (H. 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  The  patient's chief complaint is an anterior left leg wound.  One month duration.  Secondary to mild trauma in the presence of edema.  Has attempted self treatment but wound has not healed.  Non painful. Denies nausea, vomiting, fever, chills. History of left foot and ankle ulcers.  This patient has documented high risk feet requiring routine maintenance secondary to peripheral vascular disease.    12/10/2019  Patient relates he has attempted to care for leg ulcer as instructed but wound dimensions have not improved.    12/19/2019 patient relates that he has kept dressing clean dry and intact without itching or discomfort.  He has begun taking antibiotics as prescribed without nausea, vomiting, diarrhea, fever, chills.  No new pedal complaints.    12/31/19: F/u left leg ulceration. Has been in unna boot without issue.     01/07/2020 Patient relates that he has kept dressing clean dry and intact without itching or discomfort. He relates increased callus to posterior heel.  He denies nausea, vomiting, diarrhea, fever, chills.  No new pedal complaints.    01/14/2020  Patient relates that he has kept dressing clean dry and intact without itching however he relates that dressing was slipping and therefore somewhat uncomfortable this week.   He denies nausea, vomiting, diarrhea, fever, chills.  No new pedal complaints.    01/21/2020 patient relates that he has kept dressing clean, dry, intact.  He has no pedal complaints.  He does eyes nausea, vomiting, fever, chills.  He is anxious to get out of Unna boot.    1/29/20: F/u left leg ulceration. Has been wearing tubi . Pt. Is concerned regarding wound appearance.     02/06/2020 patient relates that he has kept dressing clean, dry, intact.  He has no pedal complaints.  He does eyes nausea, vomiting, fever, chills.  He relates that he has a cardiovascular procedure scheduled next week.     02/13/2020 patient relates that he has kept dressing clean, dry, intact.  He has no  pedal complaints.  He denies nausea, vomiting, fever, chills.      02/24/2020 patient relates that he has kept dressing clean, dry, and intact.  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 He denies nausea, vomiting, fever, chills.      03/02/2020 patient relates that he has kept dressing clean, dry, and intact.   He denies nausea, vomiting, fever, chills.        03/16/2020 patient relates that he has kept dressing clean, dry, and intact.   He denies nausea, vomiting, fever, chills.      03/23/2020 patient relates that he was able to keep dressing clean, dry, intact until today.  Relates that his leg and foot got wet in the shower.  He states that he has been elevating the leg to that of his ability and taking his diuretics as prescribed however he still feels as if he is retaining fluid.  Patient denies nausea, vomiting, fever, chills.  No new pedal complaints.     04/13/2020 for the last 2 weeks patient has been caring for the wound at home with supplies that he has from previous wounds.  He has been using a Tubigrip for compression.  He relates that for the last several days he has had increase in itching and increased redness to the left leg.  Patient denies nausea, vomiting, fever, chills.    04/27/2020  patient relates that he has kept dressing clean, dry, and intact.   He denies nausea, vomiting, fever, chills.     05/11/2020 patient relates that he has kept dressing clean, dry, and intact.   He denies nausea, vomiting, fever, chills.     05/25/2020 patient relates that he has kept dressing clean, dry, and intact.   He denies nausea, vomiting, fever, chills.    06/01/2020 patient relates that he has kept dressing clean, dry, and intact.   He denies nausea, vomiting, fever, chills. He has an appointment with  dermatology pending      PCP: Miguelina Weathers MD    Date Last Seen by PCP:   Chief Complaint   Patient presents with    Wound Check     Pcp Dr. Weathers 2/17/2020     Current shoe gear: tennis shoe    Hemoglobin A1C   Date Value Ref Range Status   02/11/2020 5.6 4.0 - 5.6 % Final     Comment:     ADA Screening Guidelines:  5.7-6.4%  Consistent with prediabetes  >or=6.5%  Consistent with diabetes  High levels of fetal hemoglobin interfere with the HbA1C  assay. Heterozygous hemoglobin variants (HbS, HgC, etc)do  not significantly interfere with this assay.   However, presence of multiple variants may affect accuracy.     07/11/2016 5.3 4.5 - 6.2 % Final     Comment:     According to ADA guidelines, hemoglobin A1C <7.0% represents  optimal control in non-pregnant diabetic patients.  Different  metrics may apply to specific populations.   Standards of Medical Care in Diabetes - 2016.  For the purpose of screening for the presence of diabetes:  <5.7%     Consistent with the absence of diabetes  5.7-6.4%  Consistent with increasing risk for diabetes   (prediabetes)  >or=6.5%  Consistent with diabetes  Currently no consensus exists for use of hemoglobin A1C  for diagnosis of diabetes for children.     03/31/2015 5.3 4.5 - 6.2 % Final         Current Outpatient Medications on File Prior to Visit   Medication Sig Dispense Refill    acyclovir (ZOVIRAX) 800 MG Tab Take 1 tablet (800 mg total) by mouth 2 (two) times daily. 180 tablet 6    albuterol (PROVENTIL/VENTOLIN HFA) 90 mcg/actuation inhaler Inhale 2 puffs into the lungs every 6 (six) hours as needed for Wheezing. Rescue 18 g 0    apixaban (ELIQUIS) 5 mg Tab Take 1 tablet (5 mg total) by mouth 2 (two) times daily. 180 tablet 4    artificial tears (ISOPTO TEARS) 0.5 % ophthalmic solution Place 1 drop into both eyes as needed. (Patient taking differently: Place 1 drop into both eyes as needed (for dry eyes). )      atorvastatin (LIPITOR) 40 MG tablet Take 1 tablet  (40 mg total) by mouth once daily. Please make a follow up visit with your doctor for more refills of this medication 90 tablet 0    azelastine (ASTELIN) 137 mcg (0.1 %) nasal spray 1 spray (137 mcg total) by Nasal route 2 (two) times daily. 30 mL 2    clopidogreL (PLAVIX) 75 mg tablet Take 1 tablet (75 mg total) by mouth once daily. 90 tablet 3    CYANOCOBALAMIN, VITAMIN B-12, (VITAMIN B-12 ORAL) Take 2,500 mcg by mouth once daily.      doxycycline (VIBRA-TABS) 100 MG tablet Take 1 tablet (100 mg total) by mouth 2 (two) times daily. 20 tablet 0    fluticasone propionate (FLONASE) 50 mcg/actuation nasal spray 1 spray (50 mcg total) by Each Nostril route once daily. 1 Bottle 0    furosemide (LASIX) 20 MG tablet Take 1 tablet by mouth once daily 90 tablet 0    furosemide (LASIX) 20 MG tablet Take 1 tablet (20 mg total) by mouth once daily. 90 tablet 0    gabapentin (NEURONTIN) 300 MG capsule Take 1 capsule (300 mg total) by mouth 2 (two) times daily. 60 capsule 11    melatonin 5 mg Tab Take 10 mg by mouth nightly.      metoprolol succinate (TOPROL-XL) 25 MG 24 hr tablet       multivitamin (MULTIPLE VITAMINS) per tablet Take 1 tablet by mouth once daily.      nitroGLYCERIN (NITROSTAT) 0.4 MG SL tablet Place 1 tablet (0.4 mg total) under the tongue every 5 (five) minutes as needed for Chest pain. 25 tablet 11    olmesartan (BENICAR) 20 MG tablet Take 1 tablet (20 mg total) by mouth once daily. (replaces ramipril for blood pressure) 90 tablet 1    omeprazole (PRILOSEC) 20 MG capsule Take 1 capsule (20 mg total) by mouth daily as needed. 90 capsule 0    oxybutynin (DITROPAN-XL) 5 MG TR24 Take 1 tablet (5 mg total) by mouth once daily. 30 tablet 11    oxyCODONE-acetaminophen (PERCOCET) 5-325 mg per tablet Take 1 tablet by mouth every 4 to 6 hours as needed for Pain. 20 tablet 0    tamsulosin (FLOMAX) 0.4 mg Cap Take 1 capsule (0.4 mg total) by mouth once daily. 90 capsule 3    venlafaxine (EFFEXOR) 75 MG  "tablet Take 2 tablets by mouth twice daily 360 tablet 0    vitamin D 1000 units Tab Take 1 tablet (1,000 Units total) by mouth once daily.       No current facility-administered medications on file prior to visit.        Allergies   Allergen Reactions    Ciprofloxacin Rash     Diffuse pruritic morbilliform rash developed 3/15/2017 after dose of cipro; previously in 2/2017 he had rash/fevers after initiation of cipro    Zosyn [Piperacillin-Tazobactam] Anaphylaxis     Diffuse pruritic morbilliform rash developed 3/15/2017.  Then, 430am dose on 3/16 and rash worsened with SOB/tachypnea but no hypoxemia.     Bacitracin Itching and Rash     Violaceous rash in area of topical Tx.        Past Surgical History:   Procedure Laterality Date    Cardiac stenting x2      CATARACT EXTRACTION W/  INTRAOCULAR LENS IMPLANT Right 3/29/2016    Dr. Conteh    CATARACT EXTRACTION W/  INTRAOCULAR LENS IMPLANT Left 4/12/2016        EXTERNAL FIXATION TIBIAL FRACTURE Left 07/01/2016    INSERTION OF IMPLANTABLE LOOP RECORDER  04/07/2017    LEFT HEART CATHETERIZATION Left 1/30/2020    Procedure: Left heart cath;  Surgeon: Benjamin Sandhu MD;  Location: [facility] CATH LAB;  Service: Cardiology;  Laterality: Left;  RN PREOP 1/28/20--Pt starting Plavix loading dose today (8pills)- Dr. Sandhu aware.  Pt has a bandaged "non healing area to LLE"--Dr. Sandhu aware.    LEFT HEART CATHETERIZATION Left 2/14/2020    Procedure: Left heart cath, IVUS guided left main / LAD PCI. Noon start, radial approach;  Surgeon: Miguel Angel Brady MD;  Location: [facility] CATH LAB;  Service: Cardiology;  Laterality: Left;  RN PRE OP 2-7-2020  BMI--45.61    ORIF TIBIA FRACTURE Left 07/15/2016    RADIOACTIVE SEED IMPLANTATION OF PROSTATE N/A 8/8/2018    Procedure: INSERTION, RADIOACTIVE SEED, PROSTATE;  Surgeon: Bipin Thompson MD;  [facility] OR [address] [place] [address];  Service: Urology;  Laterality: N/A;  1 hour    Squamous cell cancer removal x3 with Mohs " surgery      TONSILLECTOMY      TOTAL KNEE ARTHROPLASTY  10/2012    trus/bx      ULTRASOUND GUIDANCE  2/14/2020    Procedure: Ultrasound Guidance;  Surgeon: Miguel Angel Brady MD;  Location: [facility] CATH LAB;  Service: Cardiology;;       Family History   Problem Relation Age of Onset    Skin cancer Father     Lung cancer Father     Cancer Father         smoker,     Alzheimer's disease Mother     Hypertension Mother     Cancer Sister         colon, lung cancer     No Known Problems Sister     Cancer Brother         skin cancer, polypectomy     Peripheral vascular disease Unknown     No Known Problems Maternal Aunt     No Known Problems Maternal Uncle     No Known Problems Paternal Aunt     No Known Problems Paternal Uncle     No Known Problems Maternal Grandmother     No Known Problems Maternal Grandfather     No Known Problems Paternal Grandmother     No Known Problems Paternal Grandfather     Melanoma Neg Hx     Psoriasis Neg Hx     Lupus Neg Hx     Eczema Neg Hx     Amblyopia Neg Hx     Blindness Neg Hx     Cataracts Neg Hx     Diabetes Neg Hx     Glaucoma Neg Hx     Macular degeneration Neg Hx     Retinal detachment Neg Hx     Strabismus Neg Hx     Stroke Neg Hx     Thyroid disease Neg Hx     Acne Neg Hx        Social History     Socioeconomic History    Marital status:      Spouse name: Not on file    Number of children: Not on file    Years of education: Not on file    Highest education level: Not on file   Occupational History    Occupation: Retired    Social Needs    Financial resource strain: Not hard at all    Food insecurity:     Worry: Never true     Inability: Never true    Transportation needs:     Medical: No     Non-medical: No   Tobacco Use    Smoking status: Never Smoker    Smokeless tobacco: Never Used   Substance and Sexual Activity    Alcohol use: Yes     Frequency: 2-4 times a month     Drinks per session: 3 or 4     Binge frequency: Less than  "monthly     Comment: occasionally, beer    Drug use: Never    Sexual activity: Yes     Partners: Female   Lifestyle    Physical activity:     Days per week: Not on file     Minutes per session: Not on file    Stress: Not on file   Relationships    Social connections:     Talks on phone: Three times a week     Gets together: Once a week     Attends Buddhism service: More than 4 times per year     Active member of club or organization: Yes     Attends meetings of clubs or organizations: More than 4 times per year     Relationship status:    Other Topics Concern    Not on file   Social History Narrative    Not on file       Review of Systems   Constitution: Negative for chills and fever.   Cardiovascular: Negative for claudication and leg swelling.   Respiratory: Negative for cough and shortness of breath.    Skin: Positive for dry skin, itching (Left leg) and poor wound healing. Negative for rash.   Musculoskeletal: Positive for arthritis, joint pain, joint swelling and myalgias. Negative for falls and muscle weakness.   Gastrointestinal: Negative for diarrhea, nausea and vomiting.   Neurological: Positive for paresthesias. Negative for numbness, tremors and weakness.   Psychiatric/Behavioral: Negative for altered mental status and hallucinations.         Objective:       Vitals:    06/01/20 1121   BP: (!) 146/82   Weight: (!) 152.9 kg (337 lb)   Height: 6' 1" (1.854 m)   PainSc: 0-No pain       Physical Exam   Constitutional:  Non-toxic appearance. He does not have a sickly appearance. No distress.   Pt. is well-developed, well-nourished, appears stated age, in no acute distress, alert and oriented x 3. No evidence of depression, anxiety, or agitation. Calm, cooperative, and communicative. Appropriate interactions and affect.   Cardiovascular:   Pulses:       Dorsalis pedis pulses are 2+ on the right side, and 2+ on the left side.        Posterior tibial pulses are 1+ on the right side, and 1+ on the " left side.   There is decreased digital hair. Skin is atrophic, slightly hyperpigmented, and pitting edema nora (L>>R)     Pulmonary/Chest: No respiratory distress.   Musculoskeletal:        Right ankle: He exhibits swelling. No tenderness. No lateral malleolus, no medial malleolus, no AITFL, no CF ligament, no head of 5th metatarsal and no proximal fibula tenderness found. Achilles tendon exhibits no pain, no defect and normal Zabala's test results.        Left ankle: He exhibits swelling. No tenderness. No lateral malleolus, no medial malleolus, no AITFL, no CF ligament and no posterior TFL tenderness found. Achilles tendon exhibits no pain, no defect and normal Zabala's test results.        Right foot: There is no tenderness and no bony tenderness.        Left foot: There is no tenderness and no bony tenderness.   Decreased stride, station of gait.  apropulsive toe off.  Increased angle and base of gait.    Patient has hammertoes of digits 2-5 bilateral partially reducible without symptom today.     There is equinus deformity bilateral. There is limitation of dorsiflexion with knees extended and with knees flexed.    Shoes reveals lateral heel counter wear bilateral in athletic shoes.        Lymphadenopathy:   No lymphatic streaking    Negative lymphadenopathy bilateral popliteal fossa and tarsal tunnel.     Neurological:   Grahn-Jenn 5.07 monofilament is intact bilateral feet. Sharp/dull sensation is also intact Bilateral feet. Proprioception is grossly intact. Vibratory sensation intact (pt able to sense vibration stop within 3-5 seconds)     Skin: Skin is warm and dry. Lesion noted. No bruising, no burn, no laceration and no rash noted. He is not diaphoretic. No cyanosis. No pallor. Nails show no clubbing.   Redness and weeping noted to the left lower leg    Ulcer location: posterior left heel  Signs of infection:none  Drainage:  None  Periwound: intact  Base: thin epithelial with bleeding within  layers    Ulcer location: left anterior leg as pictured  Signs of infection:  Local edema and erythema  Drainage: Sero-Sanguinous  Purulence: no  Crepitus/fluctuance: no  Periwound: Reddened  Base: Granular/Healthy  Depth: subcutaneous tissue  Probe to bone: no   Psychiatric: His mood appears not anxious. His affect is not inappropriate. His speech is not slurred. He is not combative. He is communicative. He is attentive.   Nursing note reviewed.      06/01/2020 05/25/2020 05/11/2020 4/27/2020 04/13/2020 03/23/2020 03/16/2020 03/09/2020 03/02/2020 02/24/20 02/13/2020 02/06/20 1/31/20 01/21/20 01/14/20 01/07/20                Assessment:       Encounter Diagnoses   Name Primary?    PVD (peripheral vascular disease)     Venous stasis ulcer of left lower leg with edema of left lower leg Yes         Plan:       Janusz was seen today for wound check.    Diagnoses and all orders for this visit:    Venous stasis ulcer of left lower leg with edema of left lower leg  -     Ambulatory referral/consult to Wound Clinic; Future    PVD (peripheral vascular disease)      I counseled the patient on his conditions, their implications and medical management.    The site is cleansed of foreign material as much as possible. The patient is alerted to watch for any signs of infection (redness, pus, pain, increased swelling or fever) and call if such occurs.    Wound is stagnant     Program kirsten. Calamine Coflex for compression to the leg    Patient being seen by Dermatology for potential biopsy.    Referral placed to wound care.    Patient is to elevate legs. When sleeping, place a pillow under lower extremities. When sitting, support the legs so that they are level with the waist.    Follow-up:Patient is to present to wound clinic for follow-up but should call [facility]  immediately if any signs of infection, such as fever, chills, sweats, increased redness or pain.    Short-term goals include maintaining good offloading and minimizing bioburden, promoting granulation and epithelialization to healing.  Long-term goals include keeping the wound healed by good offloading and medical management under the direction of internist.

## 2020-06-01 NOTE — PROGRESS NOTES
CC:  Right ear discomfort/fills up   HPI:Mr. Montgomery is a 75-year-old ( white) bearded, anticoagulated  male who complains of right ear discomfort.    His right ear is his problem ear usually.  He wears a hearing aid in his right ear only.   I have treated him for cerumen impaction extractions from his right eare several times in the past.  I  last examined him 02/20/2020 right ear discomfort.  A rind of skin and cerumen was removed the right ear canal with use of water irrigation peroxide installation +suctioning.    He was advised to utilize a drop or two of white vinegar instilled in his right ear canal for canal maintenance.    He was asked return in 3 months for repeat examination cleaning of the right ear; he is heeding the advice.    Answers for HPI/ROS submitted by the patient on 6/1/2020   hearing loss: Yes  Light sensitivity / Light hurts the eyes?: Yes  shortness of breath: Yes  frequency: Yes  Sexual problems / dysfunction?: Yes  back pain: Yes  dizziness: Yes  Bruises or bleeds easily: Yes    Past Medical History:   Diagnosis Date    NAT (acute kidney injury) 3/19/2017    ALLERGIC RHINITIS     Anemia     Anxiety     Basal cell carcinoma 10/19/2018    forehead and right medial shoulder    Basal cell carcinoma 01/09/2019    left nasal bridge and left posterior ear    Chronic rhinitis 5/3/2013    Chronic rhinitis 5/3/2013    Coronary artery disease involving native coronary artery of native heart without angina pectoris s/p RCA stent     Cortical cataract of both eyes 3/18/2016    Delayed sleep phase syndrome 3/13/2019    Depression     Erectile dysfunction 3/24/2014    Erectile dysfunction 3/24/2014    Essential hypertension     GERD (gastroesophageal reflux disease) 7/25/2012    Gout, arthritis     Grade III open fracture of left tibia and fibula s/p ex-fix on 7/1/16 and ORIF of left tibia on 7/15 7/6/2016    H/O: iritis     Helicobacter pylori (H. pylori) infection      "Treated    Herpes simplex keratoconjunctivitis 9/30/2015    - on acyclovir - followed by opthalmology, Dr. Uribe     Herpes simplex keratoconjunctivitis 9/30/2015    - on acyclovir - followed by opthalmology, Dr. Uribe     Hyperkalemia 2/28/2017    Hyperlipidemia     Hypogonadism male     Hypogonadism male     Mixed anxiety and depressive disorder     Morbid obesity     Obstructive sleep apnea on CPAP     CPAP    Osteoarthritis of left knee 7/25/2012    Paroxysmal atrial fibrillation 7/6/2016    Primary osteoarthritis of left knee 7/25/2012    Prominent aorta 1/25/2016    "RESULTS: THE HEART IS MILDLY ENLARGED WITH A SLIGHTLY PROMINENT AORTA" - Xray Chest PA & Lateral 12-     Prostate cancer 2/15/2016    - followed by urology, Dr. Young     Prostate cancer 2/15/2016    - followed by urology, Dr. 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  Both ears were examined under the microscope in the micro procedure room.  Procedure:  Some viscous brown cerumen is carefully suctioned from the mildly inflamed right ear canal and from the TM surface.  The right ear canal is irrigated with water and gently suctioned once again.  The right TM is intact.  Left ear canal is dry and clean.  Left TM is slightly sclerotic.  Left middle ear space is well " aerated.      DIAGNOSIS:     ICD-10-CM ICD-9-CM    1. Ear discomfort, right H92.01 388.70    2. Other infective otitis externa of right ear, unspecified chronicity H60.391 380.10    3. Wears hearing aid in right ear Z97.4 [id number]      PLAN: AD eac cleaned with curette, suctioned/irrigated  Contingent Rx for Tobramycin sol. 0.3%; 4 drops to right ear BID x 5-7 days for AD canal infection  May use drop of white vinegar to AD canal q 2-3 weeks prn therafter  RTC 3 months for AD evaluation/cleaning

## 2020-06-01 NOTE — PATIENT INSTRUCTIONS
AD eac cleaned with curette, suctioned/irrigated  Contingent Rx for Tobramycin sol. 0.3%; 4 drops to right ear BID x 5-7 days for AD canal infection  May use drop of white vinegar to AD canal q 2-3 weeks prn therafter  RTC 3 months for AD evaluation/cleaning

## 2020-06-04 ENCOUNTER — TELEPHONE (OUTPATIENT)
Dept: ENDOSCOPY | Facility: HOSPITAL | Age: 75
End: 2020-06-04

## 2020-06-04 NOTE — TELEPHONE ENCOUNTER
Patient has an order for an EGD and colonoscopy.  Is it ok to hold Plavix 5 days and Eliquis 2 days prior to procedure?  Please advise.  Thank you.  Bernarda

## 2020-06-07 ENCOUNTER — CLINICAL SUPPORT (OUTPATIENT)
Dept: CARDIOLOGY | Facility: HOSPITAL | Age: 75
End: 2020-06-07
Attending: INTERNAL MEDICINE
Payer: MEDICARE

## 2020-06-07 DIAGNOSIS — Z46.9 FITTING AND ADJUSTMENT OF DEVICE: ICD-10-CM

## 2020-06-07 DIAGNOSIS — Z95.818 PRESENCE OF OTHER CARDIAC IMPLANTS AND GRAFTS: ICD-10-CM

## 2020-06-07 PROCEDURE — G2066 INTER DEVC REMOTE 30D: HCPCS | Mod: HCNC | Performed by: INTERNAL MEDICINE

## 2020-06-07 PROCEDURE — 93298 REM INTERROG DEV EVAL SCRMS: CPT | Mod: HCNC,,, | Performed by: INTERNAL MEDICINE

## 2020-06-07 PROCEDURE — 93298 CARDIAC DEVICE CHECK - REMOTE: ICD-10-PCS | Mod: HCNC,,, | Performed by: INTERNAL MEDICINE

## 2020-06-09 DIAGNOSIS — Z12.11 SPECIAL SCREENING FOR MALIGNANT NEOPLASMS, COLON: Primary | ICD-10-CM

## 2020-06-09 RX ORDER — POLYETHYLENE GLYCOL 3350, SODIUM SULFATE ANHYDROUS, SODIUM BICARBONATE, SODIUM CHLORIDE, POTASSIUM CHLORIDE 236; 22.74; 6.74; 5.86; 2.97 G/4L; G/4L; G/4L; G/4L; G/4L
4 POWDER, FOR SOLUTION ORAL ONCE
Qty: 4000 ML | Refills: 0 | Status: SHIPPED | OUTPATIENT
Start: 2020-06-09 | End: 2020-06-09

## 2020-06-09 NOTE — TELEPHONE ENCOUNTER
June 9, 2020   Nico Lackey MD   to Me         8:00 AM    Will proceed with Plavix on board.          7:50 AM   You routed this conversation to Nico Lackey MD    Me        7:50 AM   Note      Dr Lackey-  Please see message below from Dr Sandhu regarding holding of anti-coagulants.  Please advise.  Thank you.  Bernarda            June 8, 2020   Nathaly Kraus MA   to Me        7:54 AM    Benjamin Sandhu MD routed conversation to Edson Alexander [facility] 3 days ago     Benjamin Sandhu MD 3 days ago             Ok to hold eliquis 2 days. Needs to stay on plavix with LM POLO 2/14/20 June 5, 2020              7:09 PM   Benjamin Sandhu MD routed this conversation to [facility]    Benjamin Sandhu MD         7:09 PM   Note      Ok to hold eliquis 2 days. Needs to stay on plavix with LM POLO 2/14/20 June 4, 2020         1:36 PM   You routed this conversation to Benjamin Sandhu MD    Me      11:36 PM   Note      Patient has an order for an EGD and colonoscopy.  Is it ok to hold Plavix 5 days and Eliquis 2 days prior to procedure?  Please advise.  Thank you.  Bernarda

## 2020-06-09 NOTE — TELEPHONE ENCOUNTER
Dr Lackey-  Please see message below from Dr Sandhu regarding holding of anti-coagulants.  Please advise.  Thank you.  Bernarda

## 2020-06-10 ENCOUNTER — OFFICE VISIT (OUTPATIENT)
Dept: OPTOMETRY | Facility: CLINIC | Age: 75
End: 2020-06-10
Payer: MEDICARE

## 2020-06-10 ENCOUNTER — PATIENT OUTREACH (OUTPATIENT)
Dept: ADMINISTRATIVE | Facility: OTHER | Age: 75
End: 2020-06-10

## 2020-06-10 DIAGNOSIS — H57.12 PAIN OF LEFT EYE: Primary | ICD-10-CM

## 2020-06-10 DIAGNOSIS — E78.5 HYPERLIPIDEMIA, UNSPECIFIED HYPERLIPIDEMIA TYPE: ICD-10-CM

## 2020-06-10 DIAGNOSIS — I10 ESSENTIAL HYPERTENSION: ICD-10-CM

## 2020-06-10 PROCEDURE — 92012 INTRM OPH EXAM EST PATIENT: CPT | Mod: HCNC,S$GLB,, | Performed by: OPTOMETRIST

## 2020-06-10 PROCEDURE — 92012 PR EYE EXAM, EST PATIENT,INTERMED: ICD-10-PCS | Mod: HCNC,S$GLB,, | Performed by: OPTOMETRIST

## 2020-06-10 PROCEDURE — 99999 PR PBB SHADOW E&M-EST. PATIENT-LVL II: ICD-10-PCS | Mod: PBBFAC,HCNC,, | Performed by: OPTOMETRIST

## 2020-06-10 PROCEDURE — 99999 PR PBB SHADOW E&M-EST. PATIENT-LVL II: CPT | Mod: PBBFAC,HCNC,, | Performed by: OPTOMETRIST

## 2020-06-10 RX ORDER — OLMESARTAN MEDOXOMIL 20 MG/1
TABLET ORAL
Qty: 90 TABLET | Refills: 0 | Status: ON HOLD | OUTPATIENT
Start: 2020-06-10 | End: 2020-08-27 | Stop reason: HOSPADM

## 2020-06-10 RX ORDER — ATORVASTATIN CALCIUM 40 MG/1
TABLET, FILM COATED ORAL
Qty: 90 TABLET | Refills: 0 | Status: SHIPPED | OUTPATIENT
Start: 2020-06-10 | End: 2020-09-07 | Stop reason: SDUPTHER

## 2020-06-10 RX ORDER — TOBRAMYCIN 3 MG/ML
SOLUTION/ DROPS OPHTHALMIC
COMMUNITY
Start: 2020-06-01 | End: 2020-08-14

## 2020-06-10 NOTE — PROGRESS NOTES
HPI     Last eye exam was 1/17/20 with Dr. Dominguez.  Patient states Friday night/saturday morning, OS has been photophobic,   achy pain/straining feeling especially at the end of the day, with slight   FB sensation. Thinks it could be viral flareup. Sometimes OD is   bothersome.    Refresh/Systane prn OU            Last edited by Melani Mackenzie on 6/10/2020  2:01 PM. (History)            Assessment /Plan     For exam results, see Encounter Report.    Pain of left eye            1.  No flare-up of HSV present.  Eye discomfort caused by dry eye.  Patient always watching TV and on the computer.  Recommend using artificial tears more than 2x/day and gel at night.  Continue Acyclovir 800mg BID.  RTC as scheduled.

## 2020-06-11 DIAGNOSIS — R35.0 URINARY FREQUENCY: ICD-10-CM

## 2020-06-11 DIAGNOSIS — B00.52 HERPES SIMPLEX DISCIFORM KERATITIS: Primary | ICD-10-CM

## 2020-06-11 RX ORDER — ACYCLOVIR 800 MG/1
800 TABLET ORAL 2 TIMES DAILY
Qty: 180 TABLET | Refills: 6 | Status: SHIPPED | OUTPATIENT
Start: 2020-06-11 | End: 2021-06-23 | Stop reason: SDUPTHER

## 2020-06-11 NOTE — PROGRESS NOTES
Assessment /Plan     For exam results, see Encounter Report.    Herpes simplex disciform keratitis  -     acyclovir (ZOVIRAX) 800 MG Tab; Take 1 tablet (800 mg total) by mouth 2 (two) times daily.  Dispense: 180 tablet; Refill: 6

## 2020-06-12 ENCOUNTER — OFFICE VISIT (OUTPATIENT)
Dept: DERMATOLOGY | Facility: CLINIC | Age: 75
End: 2020-06-12
Payer: MEDICARE

## 2020-06-12 DIAGNOSIS — D48.5 NEOPLASM OF UNCERTAIN BEHAVIOR OF SKIN: Primary | ICD-10-CM

## 2020-06-12 PROCEDURE — 99499 UNLISTED E&M SERVICE: CPT | Mod: HCNC,S$GLB,, | Performed by: DERMATOLOGY

## 2020-06-12 PROCEDURE — 88305 TISSUE EXAM BY PATHOLOGIST: ICD-10-PCS | Mod: 26,HCNC,, | Performed by: PATHOLOGY

## 2020-06-12 PROCEDURE — 99999 PR PBB SHADOW E&M-EST. PATIENT-LVL III: ICD-10-PCS | Mod: PBBFAC,HCNC,, | Performed by: DERMATOLOGY

## 2020-06-12 PROCEDURE — 88305 TISSUE EXAM BY PATHOLOGIST: CPT | Mod: HCNC | Performed by: PATHOLOGY

## 2020-06-12 PROCEDURE — 99999 PR PBB SHADOW E&M-EST. PATIENT-LVL III: CPT | Mod: PBBFAC,HCNC,, | Performed by: DERMATOLOGY

## 2020-06-12 PROCEDURE — 88305 TISSUE EXAM BY PATHOLOGIST: CPT | Mod: 26,HCNC,, | Performed by: PATHOLOGY

## 2020-06-12 PROCEDURE — 11104 PR PUNCH BIOPSY, SKIN, SINGLE LESION: ICD-10-PCS | Mod: HCNC,S$GLB,, | Performed by: DERMATOLOGY

## 2020-06-12 PROCEDURE — 11104 PUNCH BX SKIN SINGLE LESION: CPT | Mod: HCNC,S$GLB,, | Performed by: DERMATOLOGY

## 2020-06-12 PROCEDURE — 99499 NO LOS: ICD-10-PCS | Mod: HCNC,S$GLB,, | Performed by: DERMATOLOGY

## 2020-06-12 RX ORDER — TAMSULOSIN HYDROCHLORIDE 0.4 MG/1
1 CAPSULE ORAL DAILY
Qty: 90 CAPSULE | Refills: 3 | Status: SHIPPED | OUTPATIENT
Start: 2020-06-12 | End: 2021-06-17 | Stop reason: SDUPTHER

## 2020-06-12 NOTE — PATIENT INSTRUCTIONS
"Punch Biopsy Wound Care    Your doctor has performed a punch biopsy today.  A band aid and antibiotic ointment has been placed over the site.  This should remain in place for 24 hours.  It is recommended that you keep the area dry for the first 24 hours.  After 24 hours, you may remove the band aid and wash the area with warm soap and water and apply Vaseline jelly.  Many patients prefer to use Neosporin or Bacitracin ointment.  This is acceptable; however know that you can develop an allergy to this medication even if you have used it safely for years.  It is important to keep the area moist.  Letting it dry out and get air slows healing time, will worsen the scar, and make it more difficult to remove the stitches if they were placed.  Band aid is optional after first 24 hours.      If you notice increasing redness, tenderness, pain, or yellow drainage at the biopsy or surgical site, please notify your doctor.  These are signs of an infection.    If your biopsy/surgical site is bleeding, apply firm pressure for 15 minutes straight.  Repeat for another 15 minutes, if it is still bleeding.   If the surgical site continues to bleed, then please contact your doctor.      For [facility] users:   You will receive a [facility] notification after the pathologist has finished reviewing your biopsy specimen. Pathology results, however, will not be released online so you will see a "no content" message. Once your dermatologist reviews and clinically correlates your biopsy results, you will either receive a letter in the mail with the results of a phone call from your doctor's office if further explanation or treatment is warranted.       [address] [place] [address]/ [phone] FAX/ [email or web address]       Recommend white vinegar: water 1:1 compresses 2x/day followed by cool blow dry and then application Zeasorb AF powder for maintenance.    "

## 2020-06-12 NOTE — PROGRESS NOTES
Subjective:       Patient ID:  Janusz Montgomery Jr. is a 75 y.o. male who presents for   Chief Complaint   Patient presents with    Lesion     nose    Lesion     left lower leg     History of Present Illness: The patient presents for follow up of skin check.    The patient was last seen on: 3/3/20 for cryosurgery to actinic keratoses which have resolved.     Other skin complaints: ulceration left lower leg 2/2 orthopedic surgery 4 years ago; approx 1 year ago area opened up and will not heal despite wound care. Wound care wants bx    H/o nmsc    No diabetes      Review of Systems   Constitutional: Negative for fever, chills and weight loss (wt # 360# - stable).   Cardiovascular: Positive for pedal edema (chronic LE edema).   Skin: Positive for daily sunscreen use, activity-related sunscreen use ( rarely outdoors) and wears hat ( when outdoors for long periods of time). Negative for itching, rash and recent sunburn.   Hematologic/Lymphatic: Bruises/bleeds easily (takes eliquis ).        Objective:    Physical Exam   Constitutional: He appears well-developed and well-nourished. He is obese.  No distress.   Neurological: He is alert and oriented to person, place, and time. He is not disoriented.   Psychiatric: He has a normal mood and affect.   Skin:   Areas Examined (abnormalities noted in diagram):   Head / Face Inspection Performed  LLE Inspection Performed              Diagram Legend     Erythematous scaling macule/papule c/w actinic keratosis       Vascular papule c/w angioma      Pigmented verrucoid papule/plaque c/w seborrheic keratosis      Yellow umbilicated papule c/w sebaceous hyperplasia      Irregularly shaped tan macule c/w lentigo     1-2 mm smooth white papules consistent with Milia      Movable subcutaneous cyst with punctum c/w epidermal inclusion cyst      Subcutaneous movable cyst c/w pilar cyst      Firm pink to brown papule c/w dermatofibroma      Pedunculated fleshy papule(s) c/w skin tag(s)       Evenly pigmented macule c/w junctional nevus     Mildly variegated pigmented, slightly irregular-bordered macule c/w mildly atypical nevus      Flesh colored to evenly pigmented papule c/w intradermal nevus       Pink pearly papule/plaque c/w basal cell carcinoma      Erythematous hyperkeratotic cursted plaque c/w SCC      Surgical scar with no sign of skin cancer recurrence      Open and closed comedones      Inflammatory papules and pustules      Verrucoid papule consistent consistent with wart     Erythematous eczematous patches and plaques     Dystrophic onycholytic nail with subungual debris c/w onychomycosis     Umbilicated papule    Erythematous-base heme-crusted tan verrucoid plaque consistent with inflamed seborrheic keratosis     Erythematous Silvery Scaling Plaque c/w Psoriasis     See annotation              Assessment / Plan:      Pathology Orders:     Normal Orders This Visit    Specimen to Pathology, Dermatology     Questions:    Procedure Type:  Dermatology and skin neoplasms    Number of Specimens:  1    ------------------------:  -------------------------    Spec 1 Procedure:  Biopsy    Spec 1 Clinical Impression:  r/o stasis ulcer vs scc    Spec 1 Source:  left lower medial leg        Neoplasm of uncertain behavior of skin  -     Specimen to Pathology, Dermatology    Punch biopsy procedure note:  Punch biopsy performed after verbal consent obtained. Area marked and prepped with alcohol. Approximately 1cc of 1% lidocaine with epinephrine injected. 3 mm disposable punch used to remove lesion. Hemostasis obtained and biopsy site closed with gelfoam. Wound care instructions reviewed with patient and handout given.               No follow-ups on file.

## 2020-06-15 ENCOUNTER — HOSPITAL ENCOUNTER (OUTPATIENT)
Dept: WOUND CARE | Facility: HOSPITAL | Age: 75
Discharge: HOME | End: 2020-06-15
Attending: PODIATRIST
Payer: MEDICARE

## 2020-06-15 VITALS
TEMPERATURE: 98 F | HEIGHT: 73 IN | SYSTOLIC BLOOD PRESSURE: 140 MMHG | RESPIRATION RATE: 20 BRPM | BODY MASS INDEX: 41.75 KG/M2 | HEART RATE: 68 BPM | DIASTOLIC BLOOD PRESSURE: 81 MMHG | WEIGHT: 315 LBS

## 2020-06-15 DIAGNOSIS — L97.929 VENOUS STASIS ULCER OF LEFT LOWER LEG WITH EDEMA OF LEFT LOWER LEG: ICD-10-CM

## 2020-06-15 DIAGNOSIS — I83.029 VENOUS STASIS ULCER OF LEFT LOWER LEG WITH EDEMA OF LEFT LOWER LEG: ICD-10-CM

## 2020-06-15 DIAGNOSIS — Z87.09 PERSONAL HISTORY OF ASBESTOSIS: ICD-10-CM

## 2020-06-15 DIAGNOSIS — L97.322 CHRONIC ULCER OF LEFT ANKLE WITH FAT LAYER EXPOSED: Primary | ICD-10-CM

## 2020-06-15 DIAGNOSIS — I83.892 VENOUS STASIS ULCER OF LEFT LOWER LEG WITH EDEMA OF LEFT LOWER LEG: ICD-10-CM

## 2020-06-15 DIAGNOSIS — J98.4 RESTRICTIVE LUNG DISEASE: ICD-10-CM

## 2020-06-15 DIAGNOSIS — R60.0 VENOUS STASIS ULCER OF LEFT LOWER LEG WITH EDEMA OF LEFT LOWER LEG: ICD-10-CM

## 2020-06-15 PROCEDURE — 99214 OFFICE O/P EST MOD 30 MIN: CPT | Mod: HCNC,,, | Performed by: FAMILY MEDICINE

## 2020-06-15 PROCEDURE — 99214 PR OFFICE/OUTPT VISIT, EST, LEVL IV, 30-39 MIN: ICD-10-PCS | Mod: HCNC,,, | Performed by: FAMILY MEDICINE

## 2020-06-15 NOTE — PROGRESS NOTES
[facility]                Progress Note    Subjective:       Patient ID: Janusz Montgomery Jr. is a 75 y.o. male.    Chief Complaint: Venous Ulcer    Pt has been having problems in same area since 4 years ago. Wound on medial ankle beefy red with epithelial  Islands throughout collogen and calamine coflex.      Review of Systems   Constitutional: Negative.    HENT: Negative.    Eyes: Negative.    Respiratory: Negative.    Cardiovascular: Negative.    Musculoskeletal: Negative.    Skin: Positive for wound.   Neurological: Negative.    Hematological: Negative.    All other systems reviewed and are negative.        Objective:        Physical Exam  Vitals signs reviewed.   Constitutional:       Appearance: He is well-developed.   HENT:      Head: Normocephalic and atraumatic.   Eyes:      Conjunctiva/sclera: Conjunctivae normal.      Pupils: Pupils are equal, round, and reactive to light.   Neck:      Musculoskeletal: Normal range of motion.   Skin:     General: Skin is warm and dry.      Comments: Please see wound description   Neurological:      Mental Status: He is alert and oriented to person, place, and time.   Psychiatric:         Behavior: Behavior normal.         Vitals:    06/15/20 1540   BP: (!) 140/81   Pulse: 68   Resp: 20   Temp: 98.2 °F (36.8 °C)       Assessment:           ICD-10-CM ICD-9-CM   1. Chronic ulcer of left ankle with fat layer exposed  L97.322 707.13   2. Venous stasis ulcer of left lower leg with edema of left lower leg  I83.029 454.8    I83.892 454.0    L97.929     R60.9    3. Restrictive lung disease  J98.4 518.89   4. Personal history of asbestosis  Z87.09 V12.69            Wound 02/14/20 Venous Ulcer Left distal Leg #1 (Active)   02/14/20     Pre-existing: Yes   Primary Wound Type: Venous ulcer   Side: Left   Orientation: distal   Location: Leg   Wound/PI Number (optional): #1   Ankle-Brachial Index:    Pulses:    Removal Indication and  Assessment:    Wound Outcome:    (Retired) Wound Type:    (Retired) Wound Length (cm):    (Retired) Wound Width (cm):    (Retired) Depth (cm):    Wound Description (Comments):    Removal Indications:    Dressing Appearance Intact 06/15/20 1544   Drainage Amount Scant 06/15/20 1544   Drainage Characteristics/Odor Serosanguineous 06/15/20 1544   Red (%), Wound Tissue Color 100 % 06/15/20 1544   Wound Length (cm) 3 cm 06/15/20 1544   Wound Width (cm) 3.2 cm 06/15/20 1544   Wound Depth (cm) 0.2 cm 06/15/20 1544   Wound Volume (cm^3) 1.92 cm^3 06/15/20 1544   Wound Surface Area (cm^2) 9.6 cm^2 06/15/20 1544   Care Cleansed with:;Antimicrobial agent;Sterile normal saline 06/15/20 1544           Plan:                Orders Placed This Encounter   Procedures    US Lower Extremity Veins Bilateral Insuf     Standing Status:   Future     Standing Expiration Date:   6/15/2021     Order Specific Question:   May the Radiologist modify the order per protocol to meet the clinical needs of the patient?     Answer:   Yes    X-Ray Chest AP Portable     Standing Status:   Future     Standing Expiration Date:   6/15/2021     Order Specific Question:   May the Radiologist modify the order per protocol to meet the clinical needs of the patient?     Answer:   Yes    CBC auto differential     Standing Status:   Future     Standing Expiration Date:   8/14/2021    Comprehensive metabolic panel     Standing Status:   Future     Standing Expiration Date:   8/14/2021    C-Reactive Protein     Standing Status:   Future     Standing Expiration Date:   8/14/2021    Prealbumin     Standing Status:   Future     Standing Expiration Date:   8/14/2021    Sedimentation rate     Standing Status:   Future     Standing Expiration Date:   8/14/2021    Ambulatory referral/consult to Wound Clinic     Standing Status:   Standing     Number of Occurrences:   1     Referral Priority:   Routine     Referral Type:   Consultation     Referral Reason:    Specialty Services Required     Requested Specialty:   Wound Care     Number of Visits Requested:   1    Change dressing     Fabienne, Aquacel foam, calamine coflex.    CV Ankle Brachial Indices WO Stress W Toe Tracings     Standing Status:   Future     Standing Expiration Date:   6/15/2021    EKG 12-lead     Standing Status:   Future     Standing Expiration Date:   6/15/2021     Order Specific Question:   Diagnosis     Answer:   Evaluation by medical service required [[id number]]        Follow up in about 1 week (around 6/22/2020).

## 2020-06-16 ENCOUNTER — TELEPHONE (OUTPATIENT)
Dept: UROLOGY | Facility: CLINIC | Age: 75
End: 2020-06-16

## 2020-06-16 ENCOUNTER — TELEPHONE (OUTPATIENT)
Dept: WOUND CARE | Facility: HOSPITAL | Age: 75
End: 2020-06-16

## 2020-06-16 NOTE — TELEPHONE ENCOUNTER
Patient called regarding pain to heel area that was worse at night in bed and relieved with dependency. Toes not discolored per patient. No numbness to leg or foot. Instructed patient to go to ED if pain worsens or toe color changes. Scheduled patient for nurse visit tomorrow to remove 2 layer compression and reassess leg.

## 2020-06-17 ENCOUNTER — HOSPITAL ENCOUNTER (OUTPATIENT)
Dept: WOUND CARE | Facility: HOSPITAL | Age: 75
Discharge: HOME | End: 2020-06-17
Attending: FAMILY MEDICINE
Payer: MEDICARE

## 2020-06-17 VITALS — SYSTOLIC BLOOD PRESSURE: 126 MMHG | TEMPERATURE: 98 F | HEART RATE: 83 BPM | DIASTOLIC BLOOD PRESSURE: 57 MMHG

## 2020-06-17 DIAGNOSIS — I83.029 VENOUS STASIS ULCER OF LEFT LOWER LEG WITH EDEMA OF LEFT LOWER LEG: Primary | ICD-10-CM

## 2020-06-17 DIAGNOSIS — R60.0 VENOUS STASIS ULCER OF LEFT LOWER LEG WITH EDEMA OF LEFT LOWER LEG: Primary | ICD-10-CM

## 2020-06-17 DIAGNOSIS — I83.892 VENOUS STASIS ULCER OF LEFT LOWER LEG WITH EDEMA OF LEFT LOWER LEG: Primary | ICD-10-CM

## 2020-06-17 DIAGNOSIS — L97.929 VENOUS STASIS ULCER OF LEFT LOWER LEG WITH EDEMA OF LEFT LOWER LEG: Primary | ICD-10-CM

## 2020-06-17 LAB
FINAL PATHOLOGIC DIAGNOSIS: NORMAL
GROSS: NORMAL

## 2020-06-17 PROCEDURE — 29581 APPL MULTLAYER CMPRN SYS LEG: CPT | Mod: HCNC,LT

## 2020-06-17 NOTE — PROGRESS NOTES
"[facility]  [address]   [place]  [id number]  Nurse Visit    Subjective:       Patient seen in clinic today. Came in for nurse visit due to complaints of heel pain that worsened at night with elevation and improved with dependency. Foot warm to touch without appearance of circulation issues at this time. Patient due for SAMANTHA on Monday prior to appointment with Dr Ghotra for wound care. Note that wound to LLE appears healthy. Cleaned and reapplied Fabienne. Note that Left Heel has a large area of hyperkeratosis that is a chronic calloused area usually pared by Podiatry (per patient account). Appears to need to be pared again to reduce pain sensations possibly associated with it. Patient has neuropathy and is on Gabapentin. Patient asking a lot of questions about his medications. I directed him to either speak with primary care physician between now and Monday or wait to have another conversation regarding likely neuropathic pain with MD Ghotra during next appointment 6/22/20. Also noted dermatitis to LLE mostly within ankle area. Patient states it is pinker than previously and does itch off and on. Offered to place tubigrip medium to LLE if patient very concerned about itching and pain at night to heel, but patient liked the Coflex and was ok with reapplication. Applied cavilon to periwound and along reddened areas to protect from moisture. Placed Aquacel AG foam to heel and over wounds prior to wrapping. Reapplied Coflex with calamine compression as ordered but only did a "spiral" wrap so as to reduce compression gradient at the ankle and perhaps improve patient's discomfort moving forward.       Assessment:          Wound 02/14/20 Venous Ulcer Left distal Leg #1 (Active)   02/14/20     Pre-existing: Yes   Primary Wound Type: Venous ulcer   Side: Left   Orientation: distal   Location: Leg   Wound/PI Number (optional): #1   Ankle-Brachial Index:    Pulses:    Removal Indication and " Assessment:    Wound Outcome:    (Retired) Wound Type:    (Retired) Wound Length (cm):    (Retired) Wound Width (cm):    (Retired) Depth (cm):    Wound Description (Comments):    Removal Indications:    Wound WDL WDL 06/17/20 1300   Dressing Appearance Dried drainage 06/17/20 1300   Drainage Amount Small 06/17/20 1300   Drainage Characteristics/Odor Serosanguineous 06/17/20 1300   Appearance Red;Granulating 06/17/20 1300   Tissue loss description Full thickness 06/17/20 1300   Black (%), Wound Tissue Color 0 % 06/17/20 1300   Red (%), Wound Tissue Color 100 % 06/17/20 1300   Yellow (%), Wound Tissue Color 0 % 06/17/20 1300   Care Cleansed with:;Soap and water;Sterile normal saline 06/17/20 1300   Dressing Changed;Collagen;Foam;Silver;Compression wrap 06/17/20 1300   Compression Two layer compression 06/17/20 1300           Plan:     No orders of the defined types were placed in this encounter.          Follow up in about 5 days (around 6/22/2020) for wound care.

## 2020-06-22 ENCOUNTER — HOSPITAL ENCOUNTER (OUTPATIENT)
Dept: WOUND CARE | Facility: HOSPITAL | Age: 75
Discharge: HOME | End: 2020-06-22
Attending: FAMILY MEDICINE
Payer: MEDICARE

## 2020-06-22 ENCOUNTER — HOSPITAL ENCOUNTER (OUTPATIENT)
Dept: RADIOLOGY | Facility: HOSPITAL | Age: 75
Discharge: HOME | End: 2020-06-22
Attending: FAMILY MEDICINE
Payer: MEDICARE

## 2020-06-22 VITALS — TEMPERATURE: 97 F | DIASTOLIC BLOOD PRESSURE: 65 MMHG | HEART RATE: 72 BPM | SYSTOLIC BLOOD PRESSURE: 138 MMHG

## 2020-06-22 DIAGNOSIS — L97.322 CHRONIC ULCER OF LEFT ANKLE WITH FAT LAYER EXPOSED: ICD-10-CM

## 2020-06-22 DIAGNOSIS — L97.929 VENOUS STASIS ULCER OF LEFT LOWER LEG WITH EDEMA OF LEFT LOWER LEG: ICD-10-CM

## 2020-06-22 DIAGNOSIS — I83.029 VENOUS STASIS ULCER OF LEFT LOWER LEG WITH EDEMA OF LEFT LOWER LEG: ICD-10-CM

## 2020-06-22 DIAGNOSIS — R60.0 VENOUS STASIS ULCER OF LEFT LOWER LEG WITH EDEMA OF LEFT LOWER LEG: ICD-10-CM

## 2020-06-22 DIAGNOSIS — I83.029 VENOUS STASIS ULCER OF LEFT LOWER LEG WITH EDEMA OF LEFT LOWER LEG: Primary | ICD-10-CM

## 2020-06-22 DIAGNOSIS — J98.4 RESTRICTIVE LUNG DISEASE: ICD-10-CM

## 2020-06-22 DIAGNOSIS — L97.929 VENOUS STASIS ULCER OF LEFT LOWER LEG WITH EDEMA OF LEFT LOWER LEG: Primary | ICD-10-CM

## 2020-06-22 DIAGNOSIS — I83.892 VENOUS STASIS ULCER OF LEFT LOWER LEG WITH EDEMA OF LEFT LOWER LEG: Primary | ICD-10-CM

## 2020-06-22 DIAGNOSIS — R60.0 VENOUS STASIS ULCER OF LEFT LOWER LEG WITH EDEMA OF LEFT LOWER LEG: Primary | ICD-10-CM

## 2020-06-22 DIAGNOSIS — I73.9 PVD (PERIPHERAL VASCULAR DISEASE): ICD-10-CM

## 2020-06-22 DIAGNOSIS — I83.892 VENOUS STASIS ULCER OF LEFT LOWER LEG WITH EDEMA OF LEFT LOWER LEG: ICD-10-CM

## 2020-06-22 PROCEDURE — 93970 US LOWER EXTREMITY VEINS BILATERAL INSUFFICIENCY: ICD-10-PCS | Mod: 26,,, | Performed by: RADIOLOGY

## 2020-06-22 PROCEDURE — 93970 EXTREMITY STUDY: CPT | Mod: TC

## 2020-06-22 PROCEDURE — 29445 APPL RIGID TOT CNTC LEG CAST: CPT | Mod: HCNC

## 2020-06-22 PROCEDURE — 99214 PR OFFICE/OUTPT VISIT, EST, LEVL IV, 30-39 MIN: ICD-10-PCS | Mod: HCNC,,, | Performed by: FAMILY MEDICINE

## 2020-06-22 PROCEDURE — 99214 OFFICE O/P EST MOD 30 MIN: CPT | Mod: HCNC,,, | Performed by: FAMILY MEDICINE

## 2020-06-22 PROCEDURE — 93970 EXTREMITY STUDY: CPT | Mod: 26,,, | Performed by: RADIOLOGY
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
## 2020-06-23 ENCOUNTER — TELEPHONE (OUTPATIENT)
Dept: DERMATOLOGY | Facility: CLINIC | Age: 75
End: 2020-06-23

## 2020-06-23 RX ORDER — FLUOROURACIL 50 MG/G
CREAM TOPICAL
Qty: 40 G | Refills: 1 | Status: SHIPPED | OUTPATIENT
Start: 2020-06-23 | End: 2020-11-05

## 2020-06-23 NOTE — TELEPHONE ENCOUNTER
1. Skin, left lower medial leg, punch biopsy:  - BASAL CELL CARCINOMA.  - THE TUMOR EXTENDS TO THE LATERAL BIOPSY MARGINS.  - THE LESION IS ULCERATED.  - DERMAL SCAR.    Spoke to pt and discussed results. Can d/c wound care at this time.     Discussed case with Dr. Mcclure. Given significant lymphedema, surgery is not recommended. Radiation would also likely leave a significant incredibly difficult to heal wound. Will treat with efudex bid x 4 weeks to attempt to shrink/treat area.     Pt to send pics to me weekly during therapy. Will consider occlusion based on response.

## 2020-06-24 ENCOUNTER — TELEPHONE (OUTPATIENT)
Dept: DERMATOLOGY | Facility: CLINIC | Age: 75
End: 2020-06-24

## 2020-06-24 NOTE — TELEPHONE ENCOUNTER
Spoke with patient and explained that he needed to contact the physician that ordered his labs to have them review them with him.  He verbalized understanding.

## 2020-06-25 ENCOUNTER — PATIENT MESSAGE (OUTPATIENT)
Dept: FAMILY MEDICINE | Facility: CLINIC | Age: 75
End: 2020-06-25

## 2020-06-26 ENCOUNTER — TELEPHONE (OUTPATIENT)
Dept: DERMATOLOGY | Facility: CLINIC | Age: 75
End: 2020-06-26

## 2020-06-26 NOTE — TELEPHONE ENCOUNTER
Spoke with pt wife, she states the area was reported as Basal cell cancer and that her  was sent medication for the cancer but has not used the medication. The skin around wound is very dry from wound care X 1 year.  Pt wife states that pt would like to wait to use cancer treatment ointment until the area around the wound is in better condition, pt scared that the wound will not reaction. I advise pt to use Vaseline over area and wrap 2 X day.          ----- Message from Destinee Sebastian sent at 6/26/2020  9:28 AM CDT -----  Regarding: pts wife  pts wife is calling to speak with the nurse they need clarification on some instructions for the pts medication pt had a procedure done. Pts leg doesn't look any better it looks worse from the biopsy that was done. Can you please call pts wife at [phone].    MELI

## 2020-06-29 ENCOUNTER — PATIENT MESSAGE (OUTPATIENT)
Dept: FAMILY MEDICINE | Facility: CLINIC | Age: 75
End: 2020-06-29

## 2020-06-29 ENCOUNTER — PATIENT MESSAGE (OUTPATIENT)
Dept: PODIATRY | Facility: CLINIC | Age: 75
End: 2020-06-29

## 2020-06-29 NOTE — TELEPHONE ENCOUNTER
Spoke with patient concerning his concerns regarding procedure done by his dermatologist.  He has questions regarding the medication he was prescribed to use to treat on the basal cell carcinoma located on his leg.  Patient instructed to contact dermatologist who did his procedure with all inquiries.

## 2020-06-30 ENCOUNTER — TELEPHONE (OUTPATIENT)
Dept: FAMILY MEDICINE | Facility: CLINIC | Age: 75
End: 2020-06-30

## 2020-06-30 DIAGNOSIS — I87.2 VENOUS STASIS DERMATITIS OF LEFT LOWER EXTREMITY: Primary | ICD-10-CM

## 2020-06-30 RX ORDER — TRIAMCINOLONE ACETONIDE 1 MG/G
OINTMENT TOPICAL
Qty: 454 G | Refills: 3 | Status: SHIPPED | OUTPATIENT
Start: 2020-06-30 | End: 2020-08-21 | Stop reason: SDUPTHER

## 2020-07-02 ENCOUNTER — PES CALL (OUTPATIENT)
Dept: ADMINISTRATIVE | Facility: CLINIC | Age: 75
End: 2020-07-02

## 2020-07-07 ENCOUNTER — HOSPITAL ENCOUNTER (OUTPATIENT)
Dept: RADIOLOGY | Facility: HOSPITAL | Age: 75
Discharge: HOME | End: 2020-07-07
Attending: FAMILY MEDICINE
Payer: MEDICARE

## 2020-07-07 ENCOUNTER — CLINICAL SUPPORT (OUTPATIENT)
Dept: CARDIOLOGY | Facility: HOSPITAL | Age: 75
End: 2020-07-07
Attending: INTERNAL MEDICINE
Payer: MEDICARE

## 2020-07-07 ENCOUNTER — OFFICE VISIT (OUTPATIENT)
Dept: FAMILY MEDICINE | Facility: CLINIC | Age: 75
End: 2020-07-07
Payer: MEDICARE

## 2020-07-07 DIAGNOSIS — Z46.9 FITTING AND ADJUSTMENT OF DEVICE: ICD-10-CM

## 2020-07-07 DIAGNOSIS — R06.02 SOB (SHORTNESS OF BREATH) ON EXERTION: ICD-10-CM

## 2020-07-07 DIAGNOSIS — Z95.818 PRESENCE OF OTHER CARDIAC IMPLANTS AND GRAFTS: ICD-10-CM

## 2020-07-07 DIAGNOSIS — M25.572 ACUTE LEFT ANKLE PAIN: Primary | ICD-10-CM

## 2020-07-07 DIAGNOSIS — E66.01 MORBID OBESITY: ICD-10-CM

## 2020-07-07 PROCEDURE — 1101F PR PT FALLS ASSESS DOC 0-1 FALLS W/OUT INJ PAST YR: ICD-10-PCS | Mod: HCNC,CPTII,S$GLB, | Performed by: FAMILY MEDICINE

## 2020-07-07 PROCEDURE — 71046 X-RAY EXAM CHEST 2 VIEWS: CPT | Mod: TC,HCNC,FY,PO

## 2020-07-07 PROCEDURE — 99999 PR PBB SHADOW E&M-EST. PATIENT-LVL IV: CPT | Mod: PBBFAC,HCNC,, | Performed by: FAMILY MEDICINE

## 2020-07-07 PROCEDURE — 99999 PR PBB SHADOW E&M-EST. PATIENT-LVL IV: ICD-10-PCS | Mod: PBBFAC,HCNC,, | Performed by: FAMILY MEDICINE

## 2020-07-07 PROCEDURE — 71046 XR CHEST PA AND LATERAL: ICD-10-PCS | Mod: 26,HCNC,, | Performed by: RADIOLOGY

## 2020-07-07 PROCEDURE — 1101F PT FALLS ASSESS-DOCD LE1/YR: CPT | Mod: HCNC,CPTII,S$GLB, | Performed by: FAMILY MEDICINE

## 2020-07-07 PROCEDURE — 1159F MED LIST DOCD IN RCRD: CPT | Mod: HCNC,S$GLB,, | Performed by: FAMILY MEDICINE

## 2020-07-07 PROCEDURE — 1159F PR MEDICATION LIST DOCUMENTED IN MEDICAL RECORD: ICD-10-PCS | Mod: HCNC,S$GLB,, | Performed by: FAMILY MEDICINE

## 2020-07-07 PROCEDURE — 93298 CARDIAC DEVICE CHECK - REMOTE: ICD-10-PCS | Mod: HCNC,,, | Performed by: INTERNAL MEDICINE

## 2020-07-07 PROCEDURE — G2066 INTER DEVC REMOTE 30D: HCPCS | Mod: HCNC | Performed by: INTERNAL MEDICINE

## 2020-07-07 PROCEDURE — 93298 REM INTERROG DEV EVAL SCRMS: CPT | Mod: HCNC,,, | Performed by: INTERNAL MEDICINE

## 2020-07-07 PROCEDURE — 71046 X-RAY EXAM CHEST 2 VIEWS: CPT | Mod: 26,HCNC,, | Performed by: RADIOLOGY

## 2020-07-07 PROCEDURE — 99214 PR OFFICE/OUTPT VISIT, EST, LEVL IV, 30-39 MIN: ICD-10-PCS | Mod: HCNC,S$GLB,, | Performed by: FAMILY MEDICINE

## 2020-07-07 PROCEDURE — 99214 OFFICE O/P EST MOD 30 MIN: CPT | Mod: HCNC,S$GLB,, | Performed by: FAMILY MEDICINE

## 2020-07-07 RX ORDER — ACETAMINOPHEN AND CODEINE PHOSPHATE 300; 30 MG/1; MG/1
1 TABLET ORAL EVERY 12 HOURS PRN
Qty: 14 TABLET | Refills: 0 | Status: SHIPPED | OUTPATIENT
Start: 2020-07-07 | End: 2020-08-21 | Stop reason: SDUPTHER

## 2020-07-07 NOTE — H&P (VIEW-ONLY)
[facility]  Progress Note    SUBJECTIVE:     Chief Complaint   Patient presents with    Ankle Pain    Shortness of Breath     states when he walks in the heat or at times at night       HPI   Janusz Montgomery Jr.  is a 75 y.o. male here for 2 complaints.  1. L ankle pain. He is currently seeing a dermatologist for a skin cancer in that ankle area. Rates pain as mild-moderate. Takes tylenol which helps but sometimes it keeps him up at night due to the pain.   2. SOB on exertion. It is better with rest. He can go 1-2 blocks then get short winded. No chest pain. Did see cardiology couple months ago. Admits been gaining weight, and not exercising. Patient has no other new complaints/problems at this time.      Review of patient's allergies indicates:   Allergen Reactions    Ciprofloxacin Rash     Diffuse pruritic morbilliform rash developed 3/15/2017 after dose of cipro; previously in 2/2017 he had rash/fevers after initiation of cipro    Zosyn [piperacillin-tazobactam] Rash     Diffuse pruritic morbilliform rash developed 3/15/2017.  Then, 430am dose on 3/16 and rash worsened with SOB/tachypnea but no hypoxemia.     Bacitracin Itching and Rash     Violaceous rash in area of topical Tx.        Past Medical History:   Diagnosis Date    NAT (acute kidney injury) 3/19/2017    ALLERGIC RHINITIS     Anemia     Anxiety     Basal cell carcinoma 10/19/2018    forehead and right medial shoulder    Basal cell carcinoma 01/09/2019    left nasal bridge and left posterior ear    Chronic rhinitis 5/3/2013    Chronic rhinitis 5/3/2013    Coronary artery disease involving native coronary artery of native heart without angina pectoris s/p RCA stent     Cortical cataract of both eyes 3/18/2016    Delayed sleep phase syndrome 3/13/2019    Depression     Erectile dysfunction 3/24/2014    Erectile dysfunction 3/24/2014    Essential hypertension     GERD (gastroesophageal reflux disease) 7/25/2012    Gout,  "arthritis     Grade III open fracture of left tibia and fibula s/p ex-fix on 7/1/16 and ORIF of left tibia on 7/15 7/6/2016    H/O: iritis     Helicobacter pylori (H. pylori) infection     Treated    Herpes simplex keratoconjunctivitis 9/30/2015    - on acyclovir - followed by opthalmology, Dr. Uribe     Herpes simplex keratoconjunctivitis 9/30/2015    - on acyclovir - followed by opthalmology, Dr. Uribe     Hyperkalemia 2/28/2017    Hyperlipidemia     Hypogonadism male     Hypogonadism male     Mixed anxiety and depressive disorder     Morbid obesity     Obstructive sleep apnea on CPAP     CPAP    Osteoarthritis of left knee 7/25/2012    Paroxysmal atrial fibrillation 7/6/2016    Primary osteoarthritis of left knee 7/25/2012    Prominent aorta 1/25/2016    "RESULTS: THE HEART IS MILDLY ENLARGED WITH A SLIGHTLY PROMINENT AORTA" - Xray Chest PA & Lateral 12-     Prostate cancer 2/15/2016    - followed by urology, Dr. Young     Prostate cancer 2/15/2016    - followed by urology, Dr. 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 Conteh    CATARACT EXTRACTION W/  INTRAOCULAR LENS IMPLANT Left 4/12/2016        EXTERNAL FIXATION TIBIAL FRACTURE Left 07/01/2016    INSERTION OF IMPLANTABLE LOOP RECORDER  04/07/2017    LEFT HEART CATHETERIZATION Left 1/30/2020    Procedure: Left heart cath;  Surgeon: Benjamin HATFIELD" "MD Edson;  Location: [facility] CATH LAB;  Service: Cardiology;  Laterality: Left;  RN PREOP 1/28/20--Pt starting Plavix loading dose today (8pills)- Dr. Sandhu aware.  Pt has a bandaged "non healing area to LLE"--Dr. Sandhu aware.    LEFT HEART CATHETERIZATION Left 2/14/2020    Procedure: Left heart cath, IVUS guided left main / LAD PCI. Noon start, radial approach;  Surgeon: Miguel Angel Brady MD;  Location: [facility] CATH LAB;  Service: Cardiology;  Laterality: Left;  RN PRE OP 2-7-2020  BMI--45.61    ORIF TIBIA FRACTURE Left 07/15/2016    RADIOACTIVE SEED IMPLANTATION OF PROSTATE N/A 8/8/2018    Procedure: INSERTION, RADIOACTIVE SEED, PROSTATE;  Surgeon: Bipin Thompson MD;  Location: [address];  Service: Urology;  Laterality: N/A;  1 hour    Squamous cell cancer removal x3 with Mohs surgery      TONSILLECTOMY      TOTAL KNEE ARTHROPLASTY  10/2012    trus/bx      ULTRASOUND GUIDANCE  2/14/2020    Procedure: Ultrasound Guidance;  Surgeon: Miguel Angel Brady MD;  Location: [facility] CATH LAB;  Service: Cardiology;;     Family History   Problem Relation Age of Onset    Skin cancer Father     Lung cancer Father     Cancer Father         smoker,     Alzheimer's disease Mother     Hypertension Mother     Cancer Sister         colon, lung cancer     No Known Problems Sister     Cancer Brother         skin cancer, polypectomy     Peripheral vascular disease Unknown     No Known Problems Maternal Aunt     No Known Problems Maternal Uncle     No Known Problems Paternal Aunt     No Known Problems Paternal Uncle     No Known Problems Maternal Grandmother     No Known Problems Maternal Grandfather     No Known Problems Paternal Grandmother     No Known Problems Paternal Grandfather     Melanoma Neg Hx     Psoriasis Neg Hx     Lupus Neg Hx     Eczema Neg Hx     Amblyopia Neg Hx     Blindness Neg Hx     Cataracts Neg Hx     Diabetes Neg Hx     Glaucoma Neg Hx     Macular degeneration Neg Hx     Retinal " detachment Neg Hx     Strabismus Neg Hx     Stroke Neg Hx     Thyroid disease Neg Hx     Acne Neg Hx      Social History     Tobacco Use    Smoking status: Never Smoker    Smokeless tobacco: Never Used   Substance Use Topics    Alcohol use: Yes     Frequency: 2-4 times a month     Drinks per session: 3 or 4     Binge frequency: Never     Comment: occasionally, beer    Drug use: Never        Review of Systems   Constitutional: Negative for chills, fever and malaise/fatigue.   HENT: Negative.    Respiratory: Positive for shortness of breath (on exertion). Negative for cough.    Cardiovascular: Positive for leg swelling. Negative for chest pain.   Gastrointestinal: Negative.  Negative for abdominal pain, nausea and vomiting.   Genitourinary: Negative.    Neurological: Negative for weakness and headaches.   All other systems reviewed and are negative.    OBJECTIVE:   There were no vitals filed for this visit.  There is no height or weight on file to calculate BMI.    Physical Exam   Constitutional: He is oriented to person, place, and time and well-developed, well-nourished, and in no distress. No distress.   HENT:   Head: Normocephalic and atraumatic.   Nose: Nose normal.   Eyes: Conjunctivae and EOM are normal.   Cardiovascular: Normal rate, regular rhythm and normal heart sounds. Exam reveals no gallop and no friction rub.   No murmur heard.  Pulmonary/Chest: Effort normal and breath sounds normal. No respiratory distress. He has no wheezes. He has no rales. He exhibits no tenderness.   Abdominal: Soft. Bowel sounds are normal. He exhibits no distension. There is no abdominal tenderness. There is no rebound.   Musculoskeletal:         General: Edema (1+ lower leg edema, pitting, b/l) present.   Neurological: He is alert and oriented to person, place, and time.   Skin: Skin is warm. He is not diaphoretic.   + left lower leg wrapped       Old records were reviewed. Labs and/or images were independently  reviewed.    ASSESSMENT     1. Acute left ankle pain    2. SOB (shortness of breath) on exertion    3. Morbid obesity        PLAN:     Acute left ankle pain  -     acetaminophen-codeine 300-30mg (TYLENOL #3) 300-30 mg Tab; Take 1 tablet by mouth every 12 (twelve) hours as needed.  Dispense: 14 tablet; Refill: 0  -     Discussed that this is not a long term medication, will need to discuss with PCP for that. F/u with dermatogy for continued skin treatment. Take tylenol PRN pain, and only tylenol 3 for severe breakthrough pain.     SOB (shortness of breath) on exertion/Morbid obesity  -     X-Ray Chest PA And Lateral; Future; Expected date: 07/07/2020  -     Suspect physical deconditioning to be main problem. Recently saw cardiology and had stents done earlier this year. Will check CXR, and go from there.       RTC PRN    Reinaldo Cannon MD  07/07/2020 9:28 AM

## 2020-07-07 NOTE — PROGRESS NOTES
[facility]  Progress Note    SUBJECTIVE:     Chief Complaint   Patient presents with    Ankle Pain    Shortness of Breath     states when he walks in the heat or at times at night       HPI   Janusz Montgomery Jr.  is a 75 y.o. male here for 2 complaints.  1. L ankle pain. He is currently seeing a dermatologist for a skin cancer in that ankle area. Rates pain as mild-moderate. Takes tylenol which helps but sometimes it keeps him up at night due to the pain.   2. SOB on exertion. It is better with rest. He can go 1-2 blocks then get short winded. No chest pain. Did see cardiology couple months ago. Admits been gaining weight, and not exercising. Patient has no other new complaints/problems at this time.      Review of patient's allergies indicates:   Allergen Reactions    Ciprofloxacin Rash     Diffuse pruritic morbilliform rash developed 3/15/2017 after dose of cipro; previously in 2/2017 he had rash/fevers after initiation of cipro    Zosyn [piperacillin-tazobactam] Rash     Diffuse pruritic morbilliform rash developed 3/15/2017.  Then, 430am dose on 3/16 and rash worsened with SOB/tachypnea but no hypoxemia.     Bacitracin Itching and Rash     Violaceous rash in area of topical Tx.        Past Medical History:   Diagnosis Date    NAT (acute kidney injury) 3/19/2017    ALLERGIC RHINITIS     Anemia     Anxiety     Basal cell carcinoma 10/19/2018    forehead and right medial shoulder    Basal cell carcinoma 01/09/2019    left nasal bridge and left posterior ear    Chronic rhinitis 5/3/2013    Chronic rhinitis 5/3/2013    Coronary artery disease involving native coronary artery of native heart without angina pectoris s/p RCA stent     Cortical cataract of both eyes 3/18/2016    Delayed sleep phase syndrome 3/13/2019    Depression     Erectile dysfunction 3/24/2014    Erectile dysfunction 3/24/2014    Essential hypertension     GERD (gastroesophageal reflux disease) 7/25/2012    Gout,  "arthritis     Grade III open fracture of left tibia and fibula s/p ex-fix on 7/1/16 and ORIF of left tibia on 7/15 7/6/2016    H/O: iritis     Helicobacter pylori (H. pylori) infection     Treated    Herpes simplex keratoconjunctivitis 9/30/2015    - on acyclovir - followed by opthalmology, Dr. Uribe     Herpes simplex keratoconjunctivitis 9/30/2015    - on acyclovir - followed by opthalmology, Dr. Uribe     Hyperkalemia 2/28/2017    Hyperlipidemia     Hypogonadism male     Hypogonadism male     Mixed anxiety and depressive disorder     Morbid obesity     Obstructive sleep apnea on CPAP     CPAP    Osteoarthritis of left knee 7/25/2012    Paroxysmal atrial fibrillation 7/6/2016    Primary osteoarthritis of left knee 7/25/2012    Prominent aorta 1/25/2016    "RESULTS: THE HEART IS MILDLY ENLARGED WITH A SLIGHTLY PROMINENT AORTA" - Xray Chest PA & Lateral 12-     Prostate cancer 2/15/2016    - followed by urology, Dr. Young     Prostate cancer 2/15/2016    - followed by urology, Dr. 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 Conteh    CATARACT EXTRACTION W/  INTRAOCULAR LENS IMPLANT Left 4/12/2016        EXTERNAL FIXATION TIBIAL FRACTURE Left 07/01/2016    INSERTION OF IMPLANTABLE LOOP RECORDER  04/07/2017    LEFT HEART CATHETERIZATION Left 1/30/2020    Procedure: Left heart cath;  Surgeon: Benjamin HATFIELD" "MD Edson;  Location: [facility] CATH LAB;  Service: Cardiology;  Laterality: Left;  RN PREOP 1/28/20--Pt starting Plavix loading dose today (8pills)- Dr. Sandhu aware.  Pt has a bandaged "non healing area to LLE"--Dr. Sandhu aware.    LEFT HEART CATHETERIZATION Left 2/14/2020    Procedure: Left heart cath, IVUS guided left main / LAD PCI. Noon start, radial approach;  Surgeon: Miguel Angel Brady MD;  Location: [facility] CATH LAB;  Service: Cardiology;  Laterality: Left;  RN PRE OP 2-7-2020  BMI--45.61    ORIF TIBIA FRACTURE Left 07/15/2016    RADIOACTIVE SEED IMPLANTATION OF PROSTATE N/A 8/8/2018    Procedure: INSERTION, RADIOACTIVE SEED, PROSTATE;  Surgeon: Bipin Thompson MD;  Location: [address];  Service: Urology;  Laterality: N/A;  1 hour    Squamous cell cancer removal x3 with Mohs surgery      TONSILLECTOMY      TOTAL KNEE ARTHROPLASTY  10/2012    trus/bx      ULTRASOUND GUIDANCE  2/14/2020    Procedure: Ultrasound Guidance;  Surgeon: Miguel Angel Brady MD;  Location: [facility] CATH LAB;  Service: Cardiology;;     Family History   Problem Relation Age of Onset    Skin cancer Father     Lung cancer Father     Cancer Father         smoker,     Alzheimer's disease Mother     Hypertension Mother     Cancer Sister         colon, lung cancer     No Known Problems Sister     Cancer Brother         skin cancer, polypectomy     Peripheral vascular disease Unknown     No Known Problems Maternal Aunt     No Known Problems Maternal Uncle     No Known Problems Paternal Aunt     No Known Problems Paternal Uncle     No Known Problems Maternal Grandmother     No Known Problems Maternal Grandfather     No Known Problems Paternal Grandmother     No Known Problems Paternal Grandfather     Melanoma Neg Hx     Psoriasis Neg Hx     Lupus Neg Hx     Eczema Neg Hx     Amblyopia Neg Hx     Blindness Neg Hx     Cataracts Neg Hx     Diabetes Neg Hx     Glaucoma Neg Hx     Macular degeneration Neg Hx     Retinal " detachment Neg Hx     Strabismus Neg Hx     Stroke Neg Hx     Thyroid disease Neg Hx     Acne Neg Hx      Social History     Tobacco Use    Smoking status: Never Smoker    Smokeless tobacco: Never Used   Substance Use Topics    Alcohol use: Yes     Frequency: 2-4 times a month     Drinks per session: 3 or 4     Binge frequency: Never     Comment: occasionally, beer    Drug use: Never        Review of Systems   Constitutional: Negative for chills, fever and malaise/fatigue.   HENT: Negative.    Respiratory: Positive for shortness of breath (on exertion). Negative for cough.    Cardiovascular: Positive for leg swelling. Negative for chest pain.   Gastrointestinal: Negative.  Negative for abdominal pain, nausea and vomiting.   Genitourinary: Negative.    Neurological: Negative for weakness and headaches.   All other systems reviewed and are negative.    OBJECTIVE:   There were no vitals filed for this visit.  There is no height or weight on file to calculate BMI.    Physical Exam   Constitutional: He is oriented to person, place, and time and well-developed, well-nourished, and in no distress. No distress.   HENT:   Head: Normocephalic and atraumatic.   Nose: Nose normal.   Eyes: Conjunctivae and EOM are normal.   Cardiovascular: Normal rate, regular rhythm and normal heart sounds. Exam reveals no gallop and no friction rub.   No murmur heard.  Pulmonary/Chest: Effort normal and breath sounds normal. No respiratory distress. He has no wheezes. He has no rales. He exhibits no tenderness.   Abdominal: Soft. Bowel sounds are normal. He exhibits no distension. There is no abdominal tenderness. There is no rebound.   Musculoskeletal:         General: Edema (1+ lower leg edema, pitting, b/l) present.   Neurological: He is alert and oriented to person, place, and time.   Skin: Skin is warm. He is not diaphoretic.   + left lower leg wrapped       Old records were reviewed. Labs and/or images were independently  reviewed.    ASSESSMENT     1. Acute left ankle pain    2. SOB (shortness of breath) on exertion    3. Morbid obesity        PLAN:     Acute left ankle pain  -     acetaminophen-codeine 300-30mg (TYLENOL #3) 300-30 mg Tab; Take 1 tablet by mouth every 12 (twelve) hours as needed.  Dispense: 14 tablet; Refill: 0  -     Discussed that this is not a long term medication, will need to discuss with PCP for that. F/u with dermatogy for continued skin treatment. Take tylenol PRN pain, and only tylenol 3 for severe breakthrough pain.     SOB (shortness of breath) on exertion/Morbid obesity  -     X-Ray Chest PA And Lateral; Future; Expected date: 07/07/2020  -     Suspect physical deconditioning to be main problem. Recently saw cardiology and had stents done earlier this year. Will check CXR, and go from there.       RTC PRN    Reinaldo Cannon MD  07/07/2020 9:28 AM

## 2020-07-17 RX ORDER — DOXYCYCLINE 100 MG/1
CAPSULE ORAL
Qty: 28 CAPSULE | Refills: 0 | Status: SHIPPED | OUTPATIENT
Start: 2020-07-17 | End: 2020-08-17

## 2020-07-20 ENCOUNTER — LAB VISIT (OUTPATIENT)
Dept: FAMILY MEDICINE | Facility: CLINIC | Age: 75
End: 2020-07-20
Payer: MEDICARE

## 2020-07-20 PROCEDURE — U0003 INFECTIOUS AGENT DETECTION BY NUCLEIC ACID (DNA OR RNA); SEVERE ACUTE RESPIRATORY SYNDROME CORONAVIRUS 2 (SARS-COV-2) (CORONAVIRUS DISEASE [COVID-19]), AMPLIFIED PROBE TECHNIQUE, MAKING USE OF HIGH THROUGHPUT TECHNOLOGIES AS DESCRIBED BY CMS-2020-01-R: HCPCS | Mod: HCNC

## 2020-07-21 LAB — SARS-COV-2 RNA RESP QL NAA+PROBE: NOT DETECTED

## 2020-07-22 ENCOUNTER — ANESTHESIA EVENT (OUTPATIENT)
Dept: ENDOSCOPY | Facility: HOSPITAL | Age: 75
End: 2020-07-22
Payer: MEDICARE

## 2020-07-23 ENCOUNTER — PATIENT OUTREACH (OUTPATIENT)
Dept: OTHER | Facility: OTHER | Age: 75
End: 2020-07-23

## 2020-07-23 ENCOUNTER — ANESTHESIA (OUTPATIENT)
Dept: ENDOSCOPY | Facility: HOSPITAL | Age: 75
End: 2020-07-23
Payer: MEDICARE

## 2020-07-23 ENCOUNTER — HOSPITAL ENCOUNTER (OUTPATIENT)
Facility: HOSPITAL | Age: 75
Discharge: HOME | End: 2020-07-23
Attending: INTERNAL MEDICINE | Admitting: INTERNAL MEDICINE
Payer: MEDICARE

## 2020-07-23 VITALS
RESPIRATION RATE: 16 BRPM | SYSTOLIC BLOOD PRESSURE: 139 MMHG | HEIGHT: 73 IN | TEMPERATURE: 98 F | WEIGHT: 315 LBS | DIASTOLIC BLOOD PRESSURE: 63 MMHG | OXYGEN SATURATION: 99 % | BODY MASS INDEX: 41.75 KG/M2 | HEART RATE: 76 BPM

## 2020-07-23 DIAGNOSIS — R68.81 EARLY SATIETY: Primary | ICD-10-CM

## 2020-07-23 PROCEDURE — 63600175 PHARM REV CODE 636 W HCPCS: Mod: HCNC | Performed by: NURSE ANESTHETIST, CERTIFIED REGISTERED

## 2020-07-23 PROCEDURE — D9220A PRA ANESTHESIA: Mod: HCNC,ANES,, | Performed by: ANESTHESIOLOGY

## 2020-07-23 PROCEDURE — D9220A PRA ANESTHESIA: ICD-10-PCS | Mod: HCNC,CRNA,, | Performed by: NURSE ANESTHETIST, CERTIFIED REGISTERED

## 2020-07-23 PROCEDURE — 88305 TISSUE EXAM BY PATHOLOGIST: CPT | Mod: 59,HCNC | Performed by: PATHOLOGY

## 2020-07-23 PROCEDURE — D9220A PRA ANESTHESIA: ICD-10-PCS | Mod: HCNC,ANES,, | Performed by: ANESTHESIOLOGY

## 2020-07-23 PROCEDURE — 27201012 HC FORCEPS, HOT/COLD, DISP: Mod: HCNC | Performed by: INTERNAL MEDICINE

## 2020-07-23 PROCEDURE — 88305 TISSUE EXAM BY PATHOLOGIST: CPT | Mod: 26,HCNC,, | Performed by: PATHOLOGY

## 2020-07-23 PROCEDURE — 37000008 HC ANESTHESIA 1ST 15 MINUTES: Mod: HCNC | Performed by: INTERNAL MEDICINE

## 2020-07-23 PROCEDURE — 37000009 HC ANESTHESIA EA ADD 15 MINS: Mod: HCNC | Performed by: INTERNAL MEDICINE

## 2020-07-23 PROCEDURE — 45378 PR COLONOSCOPY,DIAGNOSTIC: ICD-10-PCS | Mod: HCNC,GC,, | Performed by: INTERNAL MEDICINE

## 2020-07-23 PROCEDURE — 45378 DIAGNOSTIC COLONOSCOPY: CPT | Mod: HCNC | Performed by: INTERNAL MEDICINE

## 2020-07-23 PROCEDURE — D9220A PRA ANESTHESIA: Mod: HCNC,CRNA,, | Performed by: NURSE ANESTHETIST, CERTIFIED REGISTERED

## 2020-07-23 PROCEDURE — 25000003 PHARM REV CODE 250: Mod: HCNC | Performed by: NURSE ANESTHETIST, CERTIFIED REGISTERED

## 2020-07-23 PROCEDURE — 88342 IMHCHEM/IMCYTCHM 1ST ANTB: CPT | Mod: 59,HCNC | Performed by: PATHOLOGY

## 2020-07-23 PROCEDURE — 88342 CHG IMMUNOCYTOCHEMISTRY: ICD-10-PCS | Mod: 26,HCNC,, | Performed by: PATHOLOGY

## 2020-07-23 PROCEDURE — 25000003 PHARM REV CODE 250: Mod: HCNC | Performed by: INTERNAL MEDICINE

## 2020-07-23 PROCEDURE — 43239 EGD BIOPSY SINGLE/MULTIPLE: CPT | Mod: 51,HCNC,GC, | Performed by: INTERNAL MEDICINE

## 2020-07-23 PROCEDURE — 43239 PR EGD, FLEX, W/BIOPSY, SGL/MULTI: ICD-10-PCS | Mod: 51,HCNC,GC, | Performed by: INTERNAL MEDICINE

## 2020-07-23 PROCEDURE — 88342 IMHCHEM/IMCYTCHM 1ST ANTB: CPT | Mod: 26,HCNC,, | Performed by: PATHOLOGY

## 2020-07-23 PROCEDURE — 88305 TISSUE EXAM BY PATHOLOGIST: ICD-10-PCS | Mod: 26,HCNC,, | Performed by: PATHOLOGY

## 2020-07-23 PROCEDURE — 45378 DIAGNOSTIC COLONOSCOPY: CPT | Mod: HCNC,GC,, | Performed by: INTERNAL MEDICINE

## 2020-07-23 PROCEDURE — 43239 EGD BIOPSY SINGLE/MULTIPLE: CPT | Mod: HCNC | Performed by: INTERNAL MEDICINE

## 2020-07-23 RX ORDER — EPHEDRINE SULFATE 50 MG/ML
INJECTION, SOLUTION INTRAVENOUS
Status: DISCONTINUED | OUTPATIENT
Start: 2020-07-23 | End: 2020-07-23

## 2020-07-23 RX ORDER — SODIUM CHLORIDE 0.9 % (FLUSH) 0.9 %
3 SYRINGE (ML) INJECTION
Status: DISCONTINUED | OUTPATIENT
Start: 2020-07-23 | End: 2020-07-23 | Stop reason: HOSPADM

## 2020-07-23 RX ORDER — VASOPRESSIN 20 [USP'U]/ML
INJECTION, SOLUTION INTRAMUSCULAR; SUBCUTANEOUS
Status: DISCONTINUED | OUTPATIENT
Start: 2020-07-23 | End: 2020-07-23

## 2020-07-23 RX ORDER — PROPOFOL 10 MG/ML
VIAL (ML) INTRAVENOUS CONTINUOUS PRN
Status: DISCONTINUED | OUTPATIENT
Start: 2020-07-23 | End: 2020-07-23

## 2020-07-23 RX ORDER — PROPOFOL 10 MG/ML
VIAL (ML) INTRAVENOUS
Status: DISCONTINUED | OUTPATIENT
Start: 2020-07-23 | End: 2020-07-23

## 2020-07-23 RX ORDER — LIDOCAINE HYDROCHLORIDE 40 MG/ML
SOLUTION TOPICAL
Status: DISCONTINUED | OUTPATIENT
Start: 2020-07-23 | End: 2020-07-23

## 2020-07-23 RX ORDER — LIDOCAINE HYDROCHLORIDE 20 MG/ML
INJECTION INTRAVENOUS
Status: DISCONTINUED | OUTPATIENT
Start: 2020-07-23 | End: 2020-07-23

## 2020-07-23 RX ORDER — KETAMINE HCL IN 0.9 % NACL 50 MG/5 ML
SYRINGE (ML) INTRAVENOUS
Status: DISCONTINUED | OUTPATIENT
Start: 2020-07-23 | End: 2020-07-23

## 2020-07-23 RX ORDER — GLYCOPYRROLATE 0.2 MG/ML
INJECTION INTRAMUSCULAR; INTRAVENOUS
Status: DISCONTINUED | OUTPATIENT
Start: 2020-07-23 | End: 2020-07-23

## 2020-07-23 RX ORDER — SODIUM CHLORIDE 9 MG/ML
INJECTION, SOLUTION INTRAVENOUS CONTINUOUS
Status: DISCONTINUED | OUTPATIENT
Start: 2020-07-23 | End: 2020-07-23 | Stop reason: HOSPADM

## 2020-07-23 RX ORDER — PHENYLEPHRINE HYDROCHLORIDE 10 MG/ML
INJECTION INTRAVENOUS
Status: DISCONTINUED | OUTPATIENT
Start: 2020-07-23 | End: 2020-07-23

## 2020-07-23 RX ADMIN — LIDOCAINE HYDROCHLORIDE 2 ML: 40 SOLUTION TOPICAL at 09:07

## 2020-07-23 RX ADMIN — EPHEDRINE SULFATE 10 MG: 50 INJECTION INTRAVENOUS at 09:07

## 2020-07-23 RX ADMIN — Medication 10 MG: at 09:07

## 2020-07-23 RX ADMIN — PROPOFOL 60 MG: 10 INJECTION, EMULSION INTRAVENOUS at 09:07

## 2020-07-23 RX ADMIN — VASOPRESSIN 1 UNITS: 20 INJECTION, SOLUTION INTRAMUSCULAR; SUBCUTANEOUS at 09:07

## 2020-07-23 RX ADMIN — SODIUM CHLORIDE: 0.9 INJECTION, SOLUTION INTRAVENOUS at 07:07

## 2020-07-23 RX ADMIN — PROPOFOL 30 MG: 10 INJECTION, EMULSION INTRAVENOUS at 09:07

## 2020-07-23 RX ADMIN — PROPOFOL 150 MCG/KG/MIN: 10 INJECTION, EMULSION INTRAVENOUS at 09:07

## 2020-07-23 RX ADMIN — LIDOCAINE HYDROCHLORIDE 80 MG: 20 INJECTION, SOLUTION INTRAVENOUS at 09:07

## 2020-07-23 RX ADMIN — PHENYLEPHRINE HYDROCHLORIDE 200 MCG: 10 INJECTION INTRAVENOUS at 09:07

## 2020-07-23 RX ADMIN — Medication 25 MG: at 09:07

## 2020-07-23 RX ADMIN — VASOPRESSIN 1 UNITS: 20 INJECTION, SOLUTION INTRAMUSCULAR; SUBCUTANEOUS at 10:07

## 2020-07-23 RX ADMIN — FENTANYL CITRATE 25 MCG: 50 INJECTION, SOLUTION INTRAMUSCULAR; INTRAVENOUS at 09:07

## 2020-07-23 RX ADMIN — GLYCOPYRROLATE 0.2 MG: 0.2 INJECTION, SOLUTION INTRAMUSCULAR; INTRAVENOUS at 09:07

## 2020-07-23 RX ADMIN — PHENYLEPHRINE HYDROCHLORIDE 100 MCG: 10 INJECTION INTRAVENOUS at 09:07

## 2020-07-23 NOTE — PROVATION PATIENT INSTRUCTIONS
Discharge Summary/Instructions after an Endoscopic Procedure  Patient Name: Janusz Montgomery  Patient MRN: [id number]  Patient YOB: 1945 Thursday, July 23, 2020  Nico Lackey MD  RESTRICTIONS:  During your procedure today, you received medications for sedation.  These   medications may affect your judgment, balance and coordination.  Therefore,   for 24 hours, you have the following restrictions:   - DO NOT drive a car, operate machinery, make legal/financial decisions,   sign important papers or drink alcohol.    ACTIVITY:  Today: no heavy lifting, straining or running due to procedural   sedation/anesthesia.  The following day: return to full activity including work.  DIET:  Eat and drink normally unless instructed otherwise.     TREATMENT FOR COMMON SIDE EFFECTS:  - Mild abdominal pain, nausea, belching, bloating or excessive gas:  rest,   eat lightly and use a heating pad.  - Sore Throat: treat with throat lozenges and/or gargle with warm salt   water.  - Because air was used during the procedure, expelling large amounts of air   from your rectum or belching is normal.  - If a bowel prep was taken, you may not have a bowel movement for 1-3 days.    This is normal.  SYMPTOMS TO WATCH FOR AND REPORT TO YOUR PHYSICIAN:  1. Abdominal pain or bloating, other than gas cramps.  2. Chest pain.  3. Back pain.  4. Signs of infection such as: chills or fever occurring within 24 hours   after the procedure.  5. Rectal bleeding, which would show as bright red, maroon, or black stools.   (A tablespoon of blood from the rectum is not serious, especially if   hemorrhoids are present.)  6. Vomiting.  7. Weakness or dizziness.  GO DIRECTLY TO THE NEAREST EMERGENCY ROOM IF YOU HAVE ANY OF THE FOLLOWING:      Difficulty breathing              Chills and/or fever over 101 F   Persistent vomiting and/or vomiting blood   Severe abdominal pain   Severe chest pain   Black, tarry stools   Bleeding- more than one  tablespoon   Any other symptom or condition that you feel may need urgent attention  Your doctor recommends these additional instructions:  If any biopsies were taken, your doctors clinic will contact you in 1 to 2   weeks with any results.  - Patient has a contact number available for emergencies.  The signs and   symptoms of potential delayed complications were discussed with the   patient.  Return to normal activities tomorrow.  Written discharge   instructions were provided to the patient.   - Discharge patient to home.   - Resume previous diet.   - Continue present medications.   - Repeat colonoscopy is not recommended for screening purposes.   For questions, problems or results please call your physician - Nico Lackey MD at Work:  [phone].  [facility], EMERGENCY ROOM PHONE NUMBER: [phone]  IF A COMPLICATION OR EMERGENCY SITUATION ARISES AND YOU ARE UNABLE TO REACH   YOUR PHYSICIAN - GO DIRECTLY TO THE EMERGENCY ROOM.  Nico Lackey MD  7/23/2020 10:11:49 AM  This report has been verified and signed electronically.  [employer]

## 2020-07-23 NOTE — ANESTHESIA POSTPROCEDURE EVALUATION
6/6/2019  Silvino Bergeron  32/1949  78year old   male  Akua Monge MD    HPI:   Patient presents with:  Post-Op: F/u on right hip surgery. Denies any pain but notices swelling in the right leg      The patient complains of pain located right hip. Anesthesia Post Evaluation    Patient: Janusz Montgomery Jr.    Procedure(s) Performed: Procedure(s) (LRB):  EGD (ESOPHAGOGASTRODUODENOSCOPY) (N/A)  COLONOSCOPY (N/A)    Final Anesthesia Type: general    Patient location during evaluation: PACU  Patient participation: Yes- Able to Participate  Level of consciousness: awake and alert and oriented  Post-procedure vital signs: reviewed and stable  Pain management: adequate  Airway patency: patent  CHARISMA mitigation strategies: Intraoperative administration of CPAP, nasopharyngeal airway, or oral appliance during sedation and Multimodal analgesia  PONV status at discharge: No PONV  Anesthetic complications: no      Cardiovascular status: hemodynamically stable  Respiratory status: unassisted  Hydration status: euvolemic  Follow-up not needed.          Vitals Value Taken Time   /63 07/23/20 1046   Temp 36.7 °C (98 °F) 07/23/20 1045   Pulse 79 07/23/20 1054   Resp 30 07/23/20 1054   SpO2 100 % 07/23/20 1054   Vitals shown include unvalidated device data.      No case tracking events are documented in the log.      Pain/Serjio Score: Serijo Score: 10 (7/23/2020 10:45 AM)         placed in this encounter.

## 2020-07-23 NOTE — PROVATION PATIENT INSTRUCTIONS
Discharge Summary/Instructions after an Endoscopic Procedure  Patient Name: Janusz Montgomery  Patient MRN: [id number]  Patient YOB: 1945 Thursday, July 23, 2020  Nico Lackey MD  RESTRICTIONS:  During your procedure today, you received medications for sedation.  These   medications may affect your judgment, balance and coordination.  Therefore,   for 24 hours, you have the following restrictions:   - DO NOT drive a car, operate machinery, make legal/financial decisions,   sign important papers or drink alcohol.    ACTIVITY:  Today: no heavy lifting, straining or running due to procedural   sedation/anesthesia.  The following day: return to full activity including work.  DIET:  Eat and drink normally unless instructed otherwise.     TREATMENT FOR COMMON SIDE EFFECTS:  - Mild abdominal pain, nausea, belching, bloating or excessive gas:  rest,   eat lightly and use a heating pad.  - Sore Throat: treat with throat lozenges and/or gargle with warm salt   water.  - Because air was used during the procedure, expelling large amounts of air   from your rectum or belching is normal.  - If a bowel prep was taken, you may not have a bowel movement for 1-3 days.    This is normal.  SYMPTOMS TO WATCH FOR AND REPORT TO YOUR PHYSICIAN:  1. Abdominal pain or bloating, other than gas cramps.  2. Chest pain.  3. Back pain.  4. Signs of infection such as: chills or fever occurring within 24 hours   after the procedure.  5. Rectal bleeding, which would show as bright red, maroon, or black stools.   (A tablespoon of blood from the rectum is not serious, especially if   hemorrhoids are present.)  6. Vomiting.  7. Weakness or dizziness.  GO DIRECTLY TO THE NEAREST EMERGENCY ROOM IF YOU HAVE ANY OF THE FOLLOWING:      Difficulty breathing              Chills and/or fever over 101 F   Persistent vomiting and/or vomiting blood   Severe abdominal pain   Severe chest pain   Black, tarry stools   Bleeding- more than one  tablespoon   Any other symptom or condition that you feel may need urgent attention  Your doctor recommends these additional instructions:  If any biopsies were taken, your doctors clinic will contact you in 1 to 2   weeks with any results.  - Patient has a contact number available for emergencies.  The signs and   symptoms of potential delayed complications were discussed with the   patient.  Return to normal activities tomorrow.  Written discharge   instructions were provided to the patient.   - Discharge patient to home.   - Resume previous diet.   - Continue present medications.   - Await pathology results.   For questions, problems or results please call your physician - Nico Lackey MD at Work:  [phone].  [facility], EMERGENCY ROOM PHONE NUMBER: [phone]  IF A COMPLICATION OR EMERGENCY SITUATION ARISES AND YOU ARE UNABLE TO REACH   YOUR PHYSICIAN - GO DIRECTLY TO THE EMERGENCY ROOM.  Nico Lackey MD  7/23/2020 9:42:26 AM  This report has been verified and signed electronically.  [employer]ATION

## 2020-07-23 NOTE — ANESTHESIA PREPROCEDURE EVALUATION
07/23/2020  Pre-operative evaluation for Procedure(s) (LRB):  EGD (ESOPHAGOGASTRODUODENOSCOPY) (N/A)  COLONOSCOPY (N/A)    Janusz Montgomery Jr. is a 75 y.o. male CAD s/p PCI in February, GERD, CHARISMA, a fib, moderate restrictive lung disease associated with significant PURDY.     Patient Active Problem List   Diagnosis    Essential hypertension    Other hyperlipidemia    Coronary artery disease involving native coronary artery of native heart without angina pectoris s/p RCA stent    Obstructive sleep apnea    GERD (gastroesophageal reflux disease)    Benign prostatic hyperplasia with urinary obstruction    Morbid obesity with body mass index of 45.0-49.9 in adult    Prostate cancer    Paroxysmal atrial fibrillation     Major depressive disorder, recurrent episode, mild    Generalized anxiety disorder    History of gout    Herpes simplex keratoconjunctivitis    Decreased range of motion of left ankle    Right leg weakness    Impaired mobility    Balance problems    PVD (peripheral vascular disease)    Bilateral leg edema    Bilateral carotid artery stenosis    Gait abnormality    Left leg weakness    Delayed sleep phase syndrome    Floppy eyelid syndrome    Dyspnea    Insomnia    Restrictive lung disease    Personal history of asbestosis    Chronic ulcer of left ankle limited to breakdown of skin    Chest pain, atypical    CAD (coronary artery disease)    Venous stasis ulcer of left lower leg with edema of left lower leg       Review of patient's allergies indicates:   Allergen Reactions    Ciprofloxacin Rash     Diffuse pruritic morbilliform rash developed 3/15/2017 after dose of cipro; previously in 2/2017 he had rash/fevers after initiation of cipro    Zosyn [piperacillin-tazobactam] Rash     Diffuse pruritic morbilliform rash developed 3/15/2017.  Then, 430am dose on 3/16 and  rash worsened with SOB/tachypnea but no hypoxemia.     Bacitracin Itching and Rash     Violaceous rash in area of topical Tx.        No current facility-administered medications on file prior to encounter.      Current Outpatient Medications on File Prior to Encounter   Medication Sig Dispense Refill    albuterol (PROVENTIL/VENTOLIN HFA) 90 mcg/actuation inhaler Inhale 2 puffs into the lungs every 6 (six) hours as needed for Wheezing. Rescue 18 g 0    apixaban (ELIQUIS) 5 mg Tab Take 1 tablet (5 mg total) by mouth 2 (two) times daily. 180 tablet 4    artificial tears (ISOPTO TEARS) 0.5 % ophthalmic solution Place 1 drop into both eyes as needed. (Patient taking differently: Place 1 drop into both eyes as needed (for dry eyes). )      azelastine (ASTELIN) 137 mcg (0.1 %) nasal spray 1 spray (137 mcg total) by Nasal route 2 (two) times daily. 30 mL 2    clopidogreL (PLAVIX) 75 mg tablet Take 1 tablet (75 mg total) by mouth once daily. 90 tablet 3    CYANOCOBALAMIN, VITAMIN B-12, (VITAMIN B-12 ORAL) Take 2,500 mcg by mouth once daily.      doxycycline (VIBRA-TABS) 100 MG tablet Take 1 tablet (100 mg total) by mouth 2 (two) times daily. 20 tablet 0    fluticasone propionate (FLONASE) 50 mcg/actuation nasal spray 1 spray (50 mcg total) by Each Nostril route once daily. 1 Bottle 0    gabapentin (NEURONTIN) 300 MG capsule Take 1 capsule (300 mg total) by mouth 2 (two) times daily. 60 capsule 11    melatonin 5 mg Tab Take 10 mg by mouth nightly.      metoprolol succinate (TOPROL-XL) 25 MG 24 hr tablet       multivitamin (MULTIPLE VITAMINS) per tablet Take 1 tablet by mouth once daily.      nitroGLYCERIN (NITROSTAT) 0.4 MG SL tablet Place 1 tablet (0.4 mg total) under the tongue every 5 (five) minutes as needed for Chest pain. 25 tablet 11    omeprazole (PRILOSEC) 20 MG capsule Take 1 capsule (20 mg total) by mouth daily as needed. 90 capsule 0    oxybutynin (DITROPAN-XL) 5 MG TR24 Take 1 tablet (5 mg total)  "by mouth once daily. 30 tablet 11    vitamin D 1000 units Tab Take 1 tablet (1,000 Units total) by mouth once daily.         Past Surgical History:   Procedure Laterality Date    Cardiac stenting x2      CATARACT EXTRACTION W/  INTRAOCULAR LENS IMPLANT Right 3/29/2016    Dr. Conteh    CATARACT EXTRACTION W/  INTRAOCULAR LENS IMPLANT Left 4/12/2016        EXTERNAL FIXATION TIBIAL FRACTURE Left 07/01/2016    INSERTION OF IMPLANTABLE LOOP RECORDER  04/07/2017    LEFT HEART CATHETERIZATION Left 1/30/2020    Procedure: Left heart cath;  Surgeon: Benjamin Sandhu MD;  Location: [facility] CATH LAB;  Service: Cardiology;  Laterality: Left;  RN PREOP 1/28/20--Pt starting Plavix loading dose today (8pills)- Dr. Sandhu aware.  Pt has a bandaged "non healing area to LLE"--Dr. Sandhu aware.    LEFT HEART CATHETERIZATION Left 2/14/2020    Procedure: Left heart cath, IVUS guided left main / LAD PCI. Noon start, radial approach;  Surgeon: Miguel Angel Brady MD;  Location: [facility] CATH LAB;  Service: Cardiology;  Laterality: Left;  RN PRE OP 2-7-2020  BMI--45.61    ORIF TIBIA FRACTURE Left 07/15/2016    RADIOACTIVE SEED IMPLANTATION OF PROSTATE N/A 8/8/2018    Procedure: INSERTION, RADIOACTIVE SEED, PROSTATE;  Surgeon: Bipin Thompson MD;  Location: [address];  Service: Urology;  Laterality: N/A;  1 hour    Squamous cell cancer removal x3 with Mohs surgery      TONSILLECTOMY      TOTAL KNEE ARTHROPLASTY  10/2012    trus/bx      ULTRASOUND GUIDANCE  2/14/2020    Procedure: Ultrasound Guidance;  Surgeon: Miguel Angel Brady MD;  Location: [facility] CATH LAB;  Service: Cardiology;;       Social History     Socioeconomic History    Marital status:      Spouse name: Not on file    Number of children: Not on file    Years of education: Not on file    Highest education level: Not on file   Occupational History    Occupation: Retired    Social Needs    Financial resource strain: Not hard at all    Food " insecurity     Worry: Never true     Inability: Never true    Transportation needs     Medical: No     Non-medical: No   Tobacco Use    Smoking status: Never Smoker    Smokeless tobacco: Never Used   Substance and Sexual Activity    Alcohol use: Yes     Frequency: 2-4 times a month     Drinks per session: 3 or 4     Binge frequency: Never     Comment: occasionally, beer    Drug use: Never    Sexual activity: Yes     Partners: Female   Lifestyle    Physical activity     Days per week: 0 days     Minutes per session: 0 min    Stress: Only a little   Relationships    Social connections     Talks on phone: Twice a week     Gets together: Once a week     Attends [employer] service: More than 4 times per year     Active member of club or organization: Yes     Attends meetings of clubs or organizations: More than 4 times per year     Relationship status:    Other Topics Concern    Not on file   Social History Narrative    Not on file         CBC: No results for input(s): WBC, RBC, HGB, HCT, PLT, MCV, MCH, MCHC in the last 72 hours.    CMP: No results for input(s): NA, K, CL, CO2, BUN, CREATININE, GLU, MG, PHOS, CALCIUM, ALBUMIN, PROT, ALKPHOS, ALT, AST, BILITOT in the last 72 hours.    INR  No results for input(s): PT, INR, PROTIME, APTT in the last 72 hours.        Diagnostic Studies:      EKD Echo:  No results found. However, due to the size of the patient record, not all encounters were searched. Please check Results Review for a complete set of results.      Anesthesia Evaluation    I have reviewed the Patient Summary Reports.   I have reviewed the NPO Status.      Review of Systems  Anesthesia Hx:  History of prior surgery of interest to airway management or planning: Denies Family Hx of Anesthesia complications.   Denies Personal Hx of Anesthesia complications.       Physical Exam  General:  Obesity    Airway/Jaw/Neck:  Airway Findings: Mouth Opening: Normal Tongue: Normal  General Airway  Assessment: Adult  Mallampati: III  TM Distance: Normal, at least 6 cm  Jaw/Neck Findings:  Neck ROM: Normal ROM  Neck Findings:  Girth Increased  Large beard      Dental:  Dental Findings: In tact   Chest/Lungs:  Chest/Lungs Findings: Clear to auscultation, Normal Respiratory Rate         Mental Status:  Mental Status Findings:  Cooperative, Alert and Oriented         Anesthesia Plan  Type of Anesthesia, risks & benefits discussed:  Anesthesia Type:  general  Patient's Preference:   Intra-op Monitoring Plan: standard ASA monitors  Intra-op Monitoring Plan Comments:   Post Op Pain Control Plan: multimodal analgesia  Post Op Pain Control Plan Comments:   Induction:   IV  Beta Blocker:  Patient is on a Beta-Blocker and has not received dose within the past 24 hours due to non-compliance or for other reasons (Patient should receive a perioperative dose or document why it is withheld). Perioperative Beta Blocker not given due to: Other (see comments)      Informed Consent: Patient understands risks and agrees with Anesthesia plan.  Questions answered. Anesthesia consent signed with patient.  ASA Score: 3     Day of Surgery Review of History & Physical:    H&P update referred to the provider.     Anesthesia Plan Notes: Will have esmolol available intraop.         Ready For Surgery From Anesthesia Perspective.

## 2020-07-23 NOTE — PLAN OF CARE
Pt tolerated procedure well.  Discharge paperwork printed and reviewed with pt and spouse.  Copies of discharge paperwork printed and given to patient.  No complaints of pain or nausea.  Pt tolerating PO liquids well.  VSS.  IV removed prior to discharge.  Safety maintained throughout care.

## 2020-07-23 NOTE — PLAN OF CARE
Plan of care reviewed with Janusz Montgomery Jr..  Patient verbalized understanding.  Pt states he took his Plavix this AM and last took Eliquis 7/20.  Dr Lackey made aware and ok to continue with the procedures today.

## 2020-07-23 NOTE — INTERVAL H&P NOTE
The patient has been examined and the H&P has been reviewed:    There is no interval changes since last encounter.    EGD/Colonoscopy: Early satiety and change in bowels  Sedation: GA  ASA: Per anesthesia  Mallampati: Per anesthesia    Endoscopy risks, benefits and alternative options discussed and understood by patient/family.          Active Hospital Problems    Diagnosis  POA    Early satiety [R68.81]  Yes      Resolved Hospital Problems   No resolved problems to display.

## 2020-07-23 NOTE — DISCHARGE INSTRUCTIONS

## 2020-07-23 NOTE — TRANSFER OF CARE
"Anesthesia Transfer of Care Note    Patient: Janusz Montgomery Jr.    Procedure(s) Performed: Procedure(s) (LRB):  EGD (ESOPHAGOGASTRODUODENOSCOPY) (N/A)  COLONOSCOPY (N/A)    Patient location: [facility]    Anesthesia Type: general    Transport from OR: Transported from OR on 2-3 L/min O2 by NC with adequate spontaneous ventilation    Post pain: adequate analgesia    Post assessment: no apparent anesthetic complications and tolerated procedure well    Post vital signs: stable    Level of consciousness: awake, alert and oriented    Nausea/Vomiting: no nausea/vomiting    Complications: none    Transfer of care protocol was followed      Last vitals:   Visit Vitals  BP (!) 110/55 (BP Location: Left arm, Patient Position: Lying)   Pulse 86   Temp 36.5 °C (97.7 °F) (Temporal)   Resp 17   Ht 6' 1" (1.854 m)   Wt (!) 156.5 kg (345 lb)   SpO2 97%   BMI 45.52 kg/m²     "

## 2020-07-24 RX ORDER — FENTANYL CITRATE 50 UG/ML
INJECTION, SOLUTION INTRAMUSCULAR; INTRAVENOUS
Status: DISCONTINUED | OUTPATIENT
Start: 2020-07-23 | End: 2020-07-24

## 2020-07-24 NOTE — ADDENDUM NOTE
Addendum  created 07/24/20 1445 by Camryn Colbert, CRNA    Intraprocedure Meds edited, Orders acknowledged in Narrator

## 2020-07-28 LAB
FINAL PATHOLOGIC DIAGNOSIS: NORMAL
GROSS: NORMAL

## 2020-07-29 NOTE — PROGRESS NOTES
Please notify patient, the stomach biopsies did not reveal any H.pylori. Please schedule a follow up visit.

## 2020-08-06 ENCOUNTER — PATIENT OUTREACH (OUTPATIENT)
Dept: ADMINISTRATIVE | Facility: OTHER | Age: 75
End: 2020-08-06

## 2020-08-06 ENCOUNTER — CLINICAL SUPPORT (OUTPATIENT)
Dept: CARDIOLOGY | Facility: HOSPITAL | Age: 75
End: 2020-08-06
Payer: MEDICARE

## 2020-08-06 DIAGNOSIS — Z95.818 PRESENCE OF OTHER CARDIAC IMPLANTS AND GRAFTS: ICD-10-CM

## 2020-08-06 PROCEDURE — G2066 INTER DEVC REMOTE 30D: HCPCS | Mod: HCNC | Performed by: INTERNAL MEDICINE

## 2020-08-06 PROCEDURE — 93298 CARDIAC DEVICE CHECK - REMOTE: ICD-10-PCS | Mod: HCNC,,, | Performed by: INTERNAL MEDICINE

## 2020-08-06 PROCEDURE — 93298 REM INTERROG DEV EVAL SCRMS: CPT | Mod: HCNC,,, | Performed by: INTERNAL MEDICINE

## 2020-08-06 NOTE — PROGRESS NOTES
Requested updates within Care Everywhere.  Patient's chart was reviewed for overdue PENG topics.  Immunizations reconciled.

## 2020-08-07 ENCOUNTER — PATIENT MESSAGE (OUTPATIENT)
Dept: FAMILY MEDICINE | Facility: CLINIC | Age: 75
End: 2020-08-07

## 2020-08-07 NOTE — TELEPHONE ENCOUNTER
Recommend office visit - he is due for his annual follow up with me. We can discuss pain management at that time. In the meantime I recommend leg elevation to get fluid out since this is probably contributing to the pain.

## 2020-08-14 PROBLEM — R07.89 CHEST PAIN, ATYPICAL: Status: RESOLVED | Noted: 2019-10-24 | Resolved: 2020-08-14

## 2020-08-17 ENCOUNTER — LAB VISIT (OUTPATIENT)
Dept: LAB | Facility: HOSPITAL | Age: 75
End: 2020-08-17
Attending: INTERNAL MEDICINE
Payer: MEDICARE

## 2020-08-17 ENCOUNTER — OFFICE VISIT (OUTPATIENT)
Dept: FAMILY MEDICINE | Facility: CLINIC | Age: 75
End: 2020-08-17
Payer: MEDICARE

## 2020-08-17 VITALS
HEART RATE: 81 BPM | BODY MASS INDEX: 41.75 KG/M2 | TEMPERATURE: 97 F | WEIGHT: 315 LBS | OXYGEN SATURATION: 96 % | SYSTOLIC BLOOD PRESSURE: 130 MMHG | HEIGHT: 73 IN | DIASTOLIC BLOOD PRESSURE: 82 MMHG

## 2020-08-17 DIAGNOSIS — M65.311 TRIGGER FINGER OF RIGHT THUMB: ICD-10-CM

## 2020-08-17 DIAGNOSIS — Z23 NEED FOR SHINGLES VACCINE: ICD-10-CM

## 2020-08-17 DIAGNOSIS — N28.9 ACUTE RENAL INSUFFICIENCY: ICD-10-CM

## 2020-08-17 DIAGNOSIS — I10 ESSENTIAL HYPERTENSION: ICD-10-CM

## 2020-08-17 DIAGNOSIS — C61 PROSTATE CANCER: ICD-10-CM

## 2020-08-17 DIAGNOSIS — R60.0 BILATERAL LEG EDEMA: ICD-10-CM

## 2020-08-17 DIAGNOSIS — Z23 NEED FOR TDAP VACCINATION: ICD-10-CM

## 2020-08-17 DIAGNOSIS — N40.1 BENIGN PROSTATIC HYPERPLASIA WITH URINARY OBSTRUCTION: ICD-10-CM

## 2020-08-17 DIAGNOSIS — F33.0 MAJOR DEPRESSIVE DISORDER, RECURRENT EPISODE, MILD: ICD-10-CM

## 2020-08-17 DIAGNOSIS — E78.49 OTHER HYPERLIPIDEMIA: ICD-10-CM

## 2020-08-17 DIAGNOSIS — I25.10 CORONARY ARTERY DISEASE INVOLVING NATIVE CORONARY ARTERY OF NATIVE HEART WITHOUT ANGINA PECTORIS: ICD-10-CM

## 2020-08-17 DIAGNOSIS — G47.33 OBSTRUCTIVE SLEEP APNEA: ICD-10-CM

## 2020-08-17 DIAGNOSIS — L97.929 ULCER OF LEFT LOWER EXTREMITY, UNSPECIFIED ULCER STAGE: ICD-10-CM

## 2020-08-17 DIAGNOSIS — N13.8 BENIGN PROSTATIC HYPERPLASIA WITH URINARY OBSTRUCTION: ICD-10-CM

## 2020-08-17 DIAGNOSIS — Z74.09 IMPAIRED MOBILITY: ICD-10-CM

## 2020-08-17 DIAGNOSIS — Z00.00 ROUTINE MEDICAL EXAM: Primary | ICD-10-CM

## 2020-08-17 DIAGNOSIS — E66.01 MORBID OBESITY WITH BODY MASS INDEX OF 45.0-49.9 IN ADULT: ICD-10-CM

## 2020-08-17 DIAGNOSIS — E87.5 HYPERKALEMIA: ICD-10-CM

## 2020-08-17 DIAGNOSIS — I48.0 PAROXYSMAL ATRIAL FIBRILLATION: ICD-10-CM

## 2020-08-17 DIAGNOSIS — I65.23 BILATERAL CAROTID ARTERY STENOSIS: ICD-10-CM

## 2020-08-17 LAB
ANION GAP SERPL CALC-SCNC: 9 MMOL/L (ref 8–16)
BUN SERPL-MCNC: 27 MG/DL (ref 8–23)
CALCIUM SERPL-MCNC: 9 MG/DL (ref 8.7–10.5)
CHLORIDE SERPL-SCNC: 109 MMOL/L (ref 95–110)
CO2 SERPL-SCNC: 23 MMOL/L (ref 23–29)
COMPLEXED PSA SERPL-MCNC: <0.01 NG/ML (ref 0–4)
CREAT SERPL-MCNC: 1.5 MG/DL (ref 0.5–1.4)
EST. GFR  (AFRICAN AMERICAN): 52.6 ML/MIN/1.73 M^2
EST. GFR  (NON AFRICAN AMERICAN): 45.5 ML/MIN/1.73 M^2
GLUCOSE SERPL-MCNC: 106 MG/DL (ref 70–110)
POTASSIUM SERPL-SCNC: 4.6 MMOL/L (ref 3.5–5.1)
SODIUM SERPL-SCNC: 141 MMOL/L (ref 136–145)

## 2020-08-17 PROCEDURE — 80048 BASIC METABOLIC PNL TOTAL CA: CPT | Mod: HCNC

## 2020-08-17 PROCEDURE — 36415 COLL VENOUS BLD VENIPUNCTURE: CPT | Mod: HCNC,PO

## 2020-08-17 PROCEDURE — 99397 PR PREVENTIVE VISIT,EST,65 & OVER: ICD-10-PCS | Mod: HCNC,S$GLB,, | Performed by: INTERNAL MEDICINE

## 2020-08-17 PROCEDURE — 3079F DIAST BP 80-89 MM HG: CPT | Mod: HCNC,CPTII,S$GLB, | Performed by: INTERNAL MEDICINE

## 2020-08-17 PROCEDURE — 3075F PR MOST RECENT SYSTOLIC BLOOD PRESS GE 130-139MM HG: ICD-10-PCS | Mod: HCNC,CPTII,S$GLB, | Performed by: INTERNAL MEDICINE

## 2020-08-17 PROCEDURE — 99499 RISK ADDL DX/OHS AUDIT: ICD-10-PCS | Mod: S$GLB,,, | Performed by: INTERNAL MEDICINE

## 2020-08-17 PROCEDURE — 99397 PER PM REEVAL EST PAT 65+ YR: CPT | Mod: HCNC,S$GLB,, | Performed by: INTERNAL MEDICINE

## 2020-08-17 PROCEDURE — 99499 UNLISTED E&M SERVICE: CPT | Mod: S$GLB,,, | Performed by: INTERNAL MEDICINE

## 2020-08-17 PROCEDURE — 3075F SYST BP GE 130 - 139MM HG: CPT | Mod: HCNC,CPTII,S$GLB, | Performed by: INTERNAL MEDICINE

## 2020-08-17 PROCEDURE — 99999 PR PBB SHADOW E&M-EST. PATIENT-LVL V: ICD-10-PCS | Mod: PBBFAC,HCNC,, | Performed by: INTERNAL MEDICINE

## 2020-08-17 PROCEDURE — 84153 ASSAY OF PSA TOTAL: CPT | Mod: HCNC

## 2020-08-17 PROCEDURE — 3079F PR MOST RECENT DIASTOLIC BLOOD PRESSURE 80-89 MM HG: ICD-10-PCS | Mod: HCNC,CPTII,S$GLB, | Performed by: INTERNAL MEDICINE

## 2020-08-17 PROCEDURE — 99999 PR PBB SHADOW E&M-EST. PATIENT-LVL V: CPT | Mod: PBBFAC,HCNC,, | Performed by: INTERNAL MEDICINE

## 2020-08-17 NOTE — PROGRESS NOTES
"  HISTORY OF PRESENT ILLNESS:  Janusz Montgomery Jr. is a 75 y.o. male who presents to the clinic today for a routine medical physical exam. His last physical exam was approximately 1 years(s) ago.      PAST MEDICAL HISTORY:  Past Medical History:   Diagnosis Date    NAT (acute kidney injury) 3/19/2017    ALLERGIC RHINITIS     Anemia     Anxiety     Basal cell carcinoma 10/19/2018    forehead and right medial shoulder    Basal cell carcinoma 01/09/2019    left nasal bridge and left posterior ear    Chronic rhinitis 5/3/2013    Chronic rhinitis 5/3/2013    Coronary artery disease involving native coronary artery of native heart without angina pectoris s/p RCA stent     Cortical cataract of both eyes 3/18/2016    Delayed sleep phase syndrome 3/13/2019    Depression     Erectile dysfunction 3/24/2014    Erectile dysfunction 3/24/2014    Essential hypertension     GERD (gastroesophageal reflux disease) 7/25/2012    Gout, arthritis     Grade III open fracture of left tibia and fibula s/p ex-fix on 7/1/16 and ORIF of left tibia on 7/15 7/6/2016    H/O: iritis     Helicobacter pylori (H. pylori) infection     Treated    Herpes simplex keratoconjunctivitis 9/30/2015    - on acyclovir - followed by opthalmology, Dr. Uribe     Herpes simplex keratoconjunctivitis 9/30/2015    - on acyclovir - followed by opthalmology, Dr. Uribe     Hyperkalemia 2/28/2017    Hyperlipidemia     Hypogonadism male     Hypogonadism male     Mixed anxiety and depressive disorder     Morbid obesity     Obstructive sleep apnea on CPAP     CPAP    Osteoarthritis of left knee 7/25/2012    Paroxysmal atrial fibrillation 7/6/2016    Primary osteoarthritis of left knee 7/25/2012    Prominent aorta 1/25/2016    "RESULTS: THE HEART IS MILDLY ENLARGED WITH A SLIGHTLY PROMINENT AORTA" - Xray Chest PA & Lateral 12-     Prostate cancer 2/15/2016    - followed by urology, Dr. Young     Prostate cancer 2/15/2016    - " "followed by urology, Dr. 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 Conteh    CATARACT EXTRACTION W/  INTRAOCULAR LENS IMPLANT Left 4/12/2016        COLONOSCOPY N/A 7/23/2020    Procedure: COLONOSCOPY;  Surgeon: Nico Lackey MD;  Location: [facility] ([address] [place] [address]);  Service: Endoscopy;  Laterality: N/A;    ESOPHAGOGASTRODUODENOSCOPY N/A 7/23/2020    Procedure: EGD (ESOPHAGOGASTRODUODENOSCOPY);  Surgeon: Nico Lackey MD;  Location: [facility] ([address] [place] [address]);  Service: Endoscopy;  Laterality: N/A;  per Dr Lackey-Will proceed with EGD and colonoscopy on the 2nd floor due to his comorbidities including obesity, sleep apnea, restrictive lung disease, coronary artery disease.  has loop recorder      ok to hold Eliquis 2 days per Dr Sandhu-must remain    EXTERNAL FIXATION TIBIAL FRACTURE Left 07/01/2016    INSERTION OF IMPLANTABLE LOOP RECORDER  04/07/2017    LEFT HEART CATHETERIZATION Left 1/30/2020    Procedure: Left heart cath;  Surgeon: Benjamin Sandhu MD;  Location: [facility] CATH LAB;  Service: Cardiology;  Laterality: Left;  RN PREOP 1/28/20--Pt starting Plavix loading dose today (8pills)- Dr. Sandhu aware.  Pt has a bandaged "non healing area to LLE"--Dr. Sandhu aware.    LEFT HEART CATHETERIZATION Left 2/14/2020    Procedure: Left heart cath, IVUS guided left main / LAD PCI. Noon start, radial approach;  " Surgeon: Miguel Angel Brady MD;  Location: [facility] CATH LAB;  Service: Cardiology;  Laterality: Left;  RN PRE OP 2-7-2020  BMI--45.61    ORIF TIBIA FRACTURE Left 07/15/2016    RADIOACTIVE SEED IMPLANTATION OF PROSTATE N/A 8/8/2018    Procedure: INSERTION, RADIOACTIVE SEED, PROSTATE;  Surgeon: Bipin Thompson MD;  Location: [address];  Service: Urology;  Laterality: N/A;  1 hour    Squamous cell cancer removal x3 with Mohs surgery      TONSILLECTOMY      TOTAL KNEE ARTHROPLASTY  10/2012    trus/bx      ULTRASOUND GUIDANCE  2/14/2020    Procedure: Ultrasound Guidance;  Surgeon: Miguel Angel Brady MD;  Location: [facility] CATH LAB;  Service: Cardiology;;       SOCIAL HISTORY:  Social History     Socioeconomic History    Marital status:      Spouse name: Not on file    Number of children: Not on file    Years of education: Not on file    Highest education level: Not on file   Occupational History    Occupation: Retired    Social Needs    Financial resource strain: Not hard at all    Food insecurity     Worry: Never true     Inability: Never true    Transportation needs     Medical: No     Non-medical: No   Tobacco Use    Smoking status: Never Smoker    Smokeless tobacco: Never Used   Substance and Sexual Activity    Alcohol use: Yes     Frequency: 2-4 times a month     Drinks per session: 3 or 4     Binge frequency: Never     Comment: occasionally, beer    Drug use: Never    Sexual activity: Yes     Partners: Female   Lifestyle    Physical activity     Days per week: 0 days     Minutes per session: 0 min    Stress: Only a little   Relationships    Social connections     Talks on phone: Twice a week     Gets together: Once a week     Attends Hoah[employer] service: More than 4 times per year     Active member of club or organization: Yes     Attends meetings of clubs or organizations: More than 4 times per year     Relationship status:    Other Topics Concern    Not on file   Social History  Narrative    Not on file       FAMILY HISTORY:  Family History   Problem Relation Age of Onset    Skin cancer Father     Lung cancer Father     Cancer Father         smoker,     Alzheimer's disease Mother     Hypertension Mother     Cancer Sister         colon, lung cancer     No Known Problems Sister     Cancer Brother         skin cancer, polypectomy     Peripheral vascular disease Unknown     No Known Problems Maternal Aunt     No Known Problems Maternal Uncle     No Known Problems Paternal Aunt     No Known Problems Paternal Uncle     No Known Problems Maternal Grandmother     No Known Problems Maternal Grandfather     No Known Problems Paternal Grandmother     No Known Problems Paternal Grandfather     Melanoma Neg Hx     Psoriasis Neg Hx     Lupus Neg Hx     Eczema Neg Hx     Amblyopia Neg Hx     Blindness Neg Hx     Cataracts Neg Hx     Diabetes Neg Hx     Glaucoma Neg Hx     Macular degeneration Neg Hx     Retinal detachment Neg Hx     Strabismus Neg Hx     Stroke Neg Hx     Thyroid disease Neg Hx     Acne Neg Hx        ALLERGIES AND MEDICATIONS: updated and reviewed.  Review of patient's allergies indicates:   Allergen Reactions    Ciprofloxacin Rash     Diffuse pruritic morbilliform rash developed 3/15/2017 after dose of cipro; previously in 2/2017 he had rash/fevers after initiation of cipro    Zosyn [piperacillin-tazobactam] Rash     Diffuse pruritic morbilliform rash developed 3/15/2017.  Then, 430am dose on 3/16 and rash worsened with SOB/tachypnea but no hypoxemia.     Bacitracin Itching and Rash     Violaceous rash in area of topical Tx.      Medication List with Changes/Refills   Current Medications    ACYCLOVIR (ZOVIRAX) 800 MG TAB    Take 1 tablet (800 mg total) by mouth 2 (two) times daily.    ALBUTEROL (PROVENTIL/VENTOLIN HFA) 90 MCG/ACTUATION INHALER    Inhale 2 puffs into the lungs every 6 (six) hours as needed for Wheezing. Rescue    APIXABAN (ELIQUIS) 5 MG  TAB    Take 1 tablet (5 mg total) by mouth 2 (two) times daily.    ARTIFICIAL TEARS (ISOPTO TEARS) 0.5 % OPHTHALMIC SOLUTION    Place 1 drop into both eyes as needed.    ATORVASTATIN (LIPITOR) 40 MG TABLET    Take 1 tablet by mouth once daily    AZELASTINE (ASTELIN) 137 MCG (0.1 %) NASAL SPRAY    1 spray (137 mcg total) by Nasal route 2 (two) times daily.    CLOPIDOGREL (PLAVIX) 75 MG TABLET    Take 1 tablet (75 mg total) by mouth once daily.    CYANOCOBALAMIN, VITAMIN B-12, (VITAMIN B-12 ORAL)    Take 2,500 mcg by mouth once daily.    FLUOROURACIL (EFUDEX) 5 % CREAM    AAA left lower medial leg bid x 4 weeks    FLUTICASONE PROPIONATE (FLONASE) 50 MCG/ACTUATION NASAL SPRAY    1 spray (50 mcg total) by Each Nostril route once daily.    FUROSEMIDE (LASIX) 20 MG TABLET    Take 1 tablet by mouth once daily    GABAPENTIN (NEURONTIN) 300 MG CAPSULE    Take 1 capsule (300 mg total) by mouth 2 (two) times daily.    MELATONIN 5 MG TAB    Take 10 mg by mouth nightly.    METOPROLOL SUCCINATE (TOPROL-XL) 25 MG 24 HR TABLET        MULTIVITAMIN (MULTIPLE VITAMINS) PER TABLET    Take 1 tablet by mouth once daily.    NITROGLYCERIN (NITROSTAT) 0.4 MG SL TABLET    Place 1 tablet (0.4 mg total) under the tongue every 5 (five) minutes as needed for Chest pain.    OLMESARTAN (BENICAR) 20 MG TABLET    TAKE 1 TABLET BY MOUTH ONCE DAILY. REPLACES RAMIPRIL FOR BLOOD PRESSURE    OMEPRAZOLE (PRILOSEC) 20 MG CAPSULE    Take 1 capsule (20 mg total) by mouth daily as needed.    OXYBUTYNIN (DITROPAN-XL) 5 MG TR24    Take 1 tablet (5 mg total) by mouth once daily.    TAMSULOSIN (FLOMAX) 0.4 MG CAP    Take 1 capsule (0.4 mg total) by mouth once daily.    TRIAMCINOLONE ACETONIDE 0.1% (KENALOG) 0.1 % OINTMENT    AAA bid left lower leg under occlusion    VENLAFAXINE (EFFEXOR) 75 MG TABLET    Take 2 tablets by mouth twice daily    VITAMIN D 1000 UNITS TAB    Take 1 tablet (1,000 Units total) by mouth once daily.   Discontinued Medications     DOXYCYCLINE (VIBRA-TABS) 100 MG TABLET    Take 1 tablet (100 mg total) by mouth 2 (two) times daily.    DOXYCYCLINE (VIBRAMYCIN) 100 MG CAP    Take 1 po bid with food and not within 1 hour prior to lying down    TOBRAMYCIN SULFATE 0.3% (TOBREX) 0.3 % OPHTHALMIC SOLUTION    INSTILL 4 DROPS INTO RIGHT EAR TWICE DAILY FOR INFECTION         CARE TEAM:  Patient Care Team:  Miguelina Weathers MD as PCP - General (Internal Medicine)  Navdeep Guardado MD (Cardiology)  Luis Palacio MD as Consulting Physician (Cardiology)  Jessica Herndon MD (Inactive) (Nephrology)  Colt Sosa MD as Consulting Physician (Plastic Surgery)  AURELIANO Minor MD as Consulting Physician (Urology)  Walter Cortés MD as Consulting Physician (Orthopedic Surgery)  Miguelina Weathers MD as Hypertension Digital Medicine Responsible Provider (Internal Medicine)  Jocelyne Monge, PharmD as Hypertension Digital Medicine Clinician (Pharmacist)  Miguel Smith MD (Inactive) as Consulting Physician (INTERVENTIONAL CARDIOLOGY)  Naomy Anaya LPN as Licensed Practical Nurse  Jose Francisco Sawyer MD as Consulting Physician (Rheumatology)  Sandra Gary DPM as Consulting Physician (Podiatry)  MOE Smalls as Audiologist (Audiology)  Fredo Manrique MD as Resident (Pulmonary Disease)  Fabio Knutson III, MD as Consulting Physician (Otolaryngology)  Hermelinda Mcclure MD as Consulting Physician (Dermatology)  Dayanara Reed as Digital Medicine Health   [facility] Managed Medicare as Hypertension Digital Medicine Contract           SCREENING HISTORY:  Health Maintenance       Date Due Completion Date    TETANUS VACCINE 01/23/1963 ---    Shingles Vaccine (2 of 3) 07/27/2009 6/1/2009    Influenza Vaccine (1) 09/01/2020 11/20/2019    Lipid Panel 02/11/2021 2/11/2020    PROSTATE-SPECIFIC ANTIGEN 03/31/2021 3/31/2020    High Dose Statin 08/17/2021 8/17/2020    Colorectal Cancer Screening 07/23/2030 7/23/2020     "        REVIEW OF SYSTEMS:   The patient reports: poor dietary habits - : tends to eat out a lot, snacks late at night and eats a lot of snacks.  The patient reports : that they are unable to exercise because  of orthopaedic limitations.  Review of Systems   Constitutional: Positive for fatigue. Negative for chills, fever and unexpected weight change.   HENT: Negative for congestion, ear pain, sore throat and voice change.    Eyes: Negative for photophobia, pain, discharge and visual disturbance.   Respiratory: Positive for shortness of breath. Negative for cough and wheezing.    Cardiovascular: Positive for leg swelling. Negative for chest pain and palpitations.   Gastrointestinal: Negative for abdominal pain, blood in stool, constipation, diarrhea, nausea and vomiting.   Genitourinary: Negative for dysuria and frequency.   Musculoskeletal: Positive for arthralgias and back pain. Negative for gait problem, joint swelling and neck stiffness.   Skin: Positive for wound. Negative for color change and rash.   Neurological: Negative for seizures, weakness and headaches.   Hematological: Negative for adenopathy. Does not bruise/bleed easily.   Psychiatric/Behavioral: Positive for dysphoric mood and sleep disturbance. Negative for behavioral problems. The patient is not nervous/anxious.      ROS : patient denies: difficulty initiating urination          PHYSICAL EXAM:  Vitals:    08/17/20 1823   BP: 130/82   Pulse:    Temp:      Weight: (!) 156.8 kg (345 lb 10.9 oz)   Height: 6' 1" (185.4 cm)   Body mass index is 45.61 kg/m².    General appearance - alert, well appearing, and in no distress, morbidly obese  Psychiatric - alert, oriented to person, place, and time, normal mood, behavior, speech, dress, motor activity, and thought processes, mildly depressed mood (baseline)  Eyes - pupils equal and reactive, extraocular eye movements intact, sclera anicteric  Mouth - not examined; patient wearing mask due to Covid 19 " pandemic  Neck - supple, no significant adenopathy, carotids upstroke normal bilaterally, no bruits, thyroid exam: thyroid is normal in size without nodules or tenderness  Lymphatics - no palpable cervical lymphadenopathy  Chest - clear to auscultation, no wheezes, rales or rhonchi, symmetric air entry  Heart - normal rate and regular rhythm, no gallops noted  Abdomen - not examined   Male - not examined  Back exam - moderate limit in range of motion noted on exam due to body habitus, in depth exam deferred  Neurological - alert, normal speech, no focal findings or movement disorder noted, cranial nerves II through XII intact  Musculoskeletal - patient noted to have Moderate-Severe osteoarthritic changes to both knee joints. No joint effusions noted., no muscular tenderness noted; he had a right trigger thumb noted  Extremities - pedal edema 1-2+, venous stasis dermatitis noted - moderate bilateral, he had a bandage over the lower part of his left leg where he has a ulcer that is not healing well due to recent skin biopsy  Skin - normal coloration, no suspicious skin lesions      Labs:  CMP  Sodium   Date Value Ref Range Status   06/22/2020 139 136 - 145 mmol/L Final     Potassium   Date Value Ref Range Status   06/22/2020 5.2 (H) 3.5 - 5.1 mmol/L Final     Chloride   Date Value Ref Range Status   06/22/2020 107 95 - 110 mmol/L Final     CO2   Date Value Ref Range Status   06/22/2020 26 23 - 29 mmol/L Final     Glucose   Date Value Ref Range Status   06/22/2020 95 70 - 110 mg/dL Final     BUN, Bld   Date Value Ref Range Status   06/22/2020 32 (H) 8 - 23 mg/dL Final     Creatinine   Date Value Ref Range Status   06/22/2020 1.6 (H) 0.5 - 1.4 mg/dL Final     Calcium   Date Value Ref Range Status   06/22/2020 9.1 8.7 - 10.5 mg/dL Final     Total Protein   Date Value Ref Range Status   06/22/2020 7.3 6.0 - 8.4 g/dL Final     Albumin   Date Value Ref Range Status   06/22/2020 3.7 3.5 - 5.2 g/dL Final     Total Bilirubin    Date Value Ref Range Status   06/22/2020 0.6 0.1 - 1.0 mg/dL Final     Comment:     For infants and newborns, interpretation of results should be based  on gestational age, weight and in agreement with clinical  observations.  Premature Infant recommended reference ranges:  Up to 24 hours.............<8.0 mg/dL  Up to 48 hours............<12.0 mg/dL  3-5 days..................<15.0 mg/dL  6-29 days.................<15.0 mg/dL       Alkaline Phosphatase   Date Value Ref Range Status   06/22/2020 116 55 - 135 U/L Final     AST   Date Value Ref Range Status   06/22/2020 23 10 - 40 U/L Final     ALT   Date Value Ref Range Status   06/22/2020 31 10 - 44 U/L Final     Anion Gap   Date Value Ref Range Status   06/22/2020 6 (L) 8 - 16 mmol/L Final     eGFR if    Date Value Ref Range Status   06/22/2020 49 (A) >60 mL/min/1.73 m^2 Final     eGFR if non    Date Value Ref Range Status   06/22/2020 42 (A) >60 mL/min/1.73 m^2 Final     Comment:     Calculation used to obtain the estimated glomerular filtration  rate (eGFR) is the CKD-EPI equation.                ASSESSMENT AND PLAN:  1. Routine medical exam  Counseled on age appropriate medical preventative services including age appropriate cancer screenings, age appropriate eye and dental exams, over all nutritional health, need for a consistent exercise regimen, and an over all push towards maintaining a vigorous and active lifestyle.  Counseled on age appropriate vaccines and discussed upcoming health care needs based on age/gender. Discussed good sleep hygiene and stress management.    2. Essential hypertension/3. Coronary artery disease involving native coronary artery of native heart without angina pectoris s/p RCA stent/4. Paroxysmal atrial fibrillation /5. Bilateral carotid artery stenosis  Discussed sodium restriction, maintaining ideal body weight and regular exercise program as physiologic means to achieve blood pressure control. The  patient will strive towards this.   The current medical regimen is effective;  continue present plan and medications. Recommended patient to check home readings to monitor and see me for followup as scheduled or sooner as needed.   Patient was educated that both decongestant and anti-inflammatory medication may raise blood pressure.  The patient is active on the digital hypertension program.  He is followed by Cardiology - he is overdue for follow-up.  I encouraged him to make an appointment at his earliest convenience concern the fact that he is having some shortness of breath.  I suspect this is mostly deconditioning, however.    6. Other hyperlipidemia  We discussed low fat diet and regular exercise.The current medical regimen is effective;  continue present plan and medications.     7. Benign prostatic hyperplasia with urinary obstruction/8. Prostate cancer  The current medical regimen is effective;  continue present plan and medications.   Followed by: Urology.     9. Major depressive disorder, recurrent episode, mild  The current medical regimen is effective;  continue present plan and medications.     10. Obstructive sleep apnea  CPAP compliance: yes (fair). The patient reports that they continue to benefit from regular use of their CPAP machine..  We discussed the potential ramifications of untreated sleep apnea, which could include daytime sleepiness, hypertension, heart disease including CHF, sudden death while sleeping and/or stroke. The patient was advised to abstain from driving should they feel sleepy or drowsy.  We discussed potential treatment options, which could include weight loss, body positioning, continuous positive airway pressure (CPAP), or referral for surgical consideration.     11. Bilateral leg edema  I discussed with the patient that there is a dilemma regarding the need to get fluid out of his legs, yet the recent decrease in kidney function which may be related to his Lasix use.  We  discussed leg elevation, support stockings, and low-salt diet.  We discussed the need for weight loss.  I advised him to take the Lasix only as needed.  I will refer him to vascular surgery for further evaluation.    12. Morbid obesity with body mass index of 45.0-49.9 in adult  The patient is asked to make an attempt to improve diet and exercise patterns to aid in medical management of this problem.    13. Impaired mobility  Doing okay at this time.    14. Need for Tdap vaccination/15. Need for shingles vaccine  Patient was advised to get immunization at the pharmacy.    16. Hyperkalemia  I will recheck a lab today.  We discussed the need to avoid foods high in potassium.  - Basic metabolic panel; Future    17. Acute renal insufficiency  I will check a kidney ultrasound and refer him to Nephrology for further evaluation.  - US Retroperitoneal Complete (Kidney and; Future  - Ambulatory referral/consult to Nephrology; Future    18. Ulcer of left lower extremity, unspecified ulcer stage  The patient has been refer to wound care and is also followed by dermatology.    19. Trigger finger of right thumb  I will refer him to orthopedics for further evaluation and treatment as per his request.  - Ambulatory referral/consult to Orthopedics; Future           Follow up in about 6 months (around 2/17/2021), or if symptoms worsen or fail to improve, for follow up chronic medical conditions.. or sooner as needed.

## 2020-08-18 ENCOUNTER — TELEPHONE (OUTPATIENT)
Dept: WOUND CARE | Facility: CLINIC | Age: 75
End: 2020-08-18

## 2020-08-18 NOTE — TELEPHONE ENCOUNTER
Returned call and spoke with patient offered 7am Friday 8/21/2020 - accepted appointment date/time and was instructed to report to 5th floor clinic [facility] location.

## 2020-08-18 NOTE — TELEPHONE ENCOUNTER
----- Message from Marzena Mancilla NP sent at 8/17/2020  1:58 PM CDT -----  I would be happy to.  I will forward this to Jesica to schedule.    Kezia  ----- Message -----  From: Bianca Kumar MD  Sent: 8/17/2020   8:37 AM CDT  To: SHAKEEL Pal,  This morbidly obese gentleman with PVD was treated for a BCC lower leg with efudex bid x 1 month (last application around end of July). Many pics in chart. He needs your expertise. Will you please see him? Thanks EARNEST

## 2020-08-19 NOTE — PROGRESS NOTES
Subjective:       Patient ID: Janusz Montgomery Jr. is a 75 y.o. white male who presents for follow-up evaluation of   No chief complaint on file.    HPI     Patient is new to me. Last seen by Dr. Moctezuma in December 2017; lost to follow up since then. He was followed by Dr. 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 Moctezuma.  Prior pertinent chart reviewed since this is patient's first appointment with me.    Patient presents for f/u of CKD.  Baseline creatinine of 1.5-1.6 mg/dL since Feb 2020; prior to that, it was 1.01-1.2 mg/dL.  Appears that patient had NAT in Feb 2020 that never returned to baseline. Elevated sCr initially noted on 2/11; patient had cardiac cath on 2/14 and was stented. No longer on lasix; says a provider told him to stop it due to renal function, but he feels like he needs it for his edema. Has been working on elevating legs and low salt diet.    BP low today. Says he has been dizzier than usual since last night. LPN got SBP 90 in one arm and 70 in the other arm. SBP remained low on repeat.    Significant hx includes HLD, CAD s angina, HTN, skin ulcer, BCC, sepsis, AKIs, afib    The patient denies taking NSAIDs, recreational drugs, recent episode of dehydration, diarrhea, nausea or vomiting, acute illness, hospitalization or exposure to IV radiocontrast. On Plavix and Eliquis. Says he is often on antibiotics. Takes melatonin.     Significant family hx includes: alzheimer's, HTN, skin ca, lung ca, PVD. No known renal disease.    Last renal US: 3/2017 , reviewed.    Review of Systems   Constitutional: Positive for fatigue. Negative for appetite change and fever.   HENT: Negative for facial swelling.    Respiratory: Positive for shortness of breath. Negative for cough and wheezing.    Cardiovascular: Positive for leg swelling. Negative for chest pain and palpitations.   Gastrointestinal: Negative for constipation, diarrhea, nausea and vomiting.   Endocrine: Positive for  "polydipsia and polyuria.   Genitourinary: Positive for frequency and urgency. Negative for difficulty urinating, dysuria, flank pain and hematuria.   Musculoskeletal: Negative for back pain.   Skin: Positive for wound (to left leg).   Neurological: Positive for dizziness and light-headedness.   Psychiatric/Behavioral: Positive for sleep disturbance.       Objective:       Blood pressure 90/60, pulse 68, height 6' 1" (1.854 m), weight (!) 155.3 kg (342 lb 6 oz), SpO2 96 %.  Physical Exam  Vitals signs reviewed.   Constitutional:       General: He is not in acute distress.     Appearance: Normal appearance. He is well-developed. He is obese.   Eyes:      Conjunctiva/sclera: Conjunctivae normal.   Neck:      Musculoskeletal: Neck supple.      Vascular: No JVD.   Cardiovascular:      Rate and Rhythm: Normal rate and regular rhythm.      Heart sounds: Normal heart sounds. No murmur. No friction rub. No gallop.    Pulmonary:      Effort: Pulmonary effort is normal. No respiratory distress.      Breath sounds: Normal breath sounds.   Abdominal:      General: Bowel sounds are normal. There is no distension.      Palpations: Abdomen is soft.      Tenderness: There is no abdominal tenderness. There is no right CVA tenderness or left CVA tenderness.   Musculoskeletal:         General: Swelling (trace to right leg; left leg wrapped) present.   Skin:     General: Skin is warm and dry.      Findings: No lesion or rash.      Nails: There is no clubbing.     Neurological:      Mental Status: He is alert and oriented to person, place, and time.   Psychiatric:         Mood and Affect: Mood normal.         Behavior: Behavior normal.         Thought Content: Thought content normal.         Judgment: Judgment normal.           Lab Results   Component Value Date    CREATININE 1.5 (H) 08/17/2020     Prot/Creat Ratio, Ur   Date Value Ref Range Status   03/17/2017 0.15 0.00 - 0.20 Final     Lab Results   Component Value Date     " 08/17/2020    K 4.6 08/17/2020    CO2 23 08/17/2020     08/17/2020     Lab Results   Component Value Date    .0 (H) 12/12/2017    CALCIUM 9.0 08/17/2020    PHOS 2.6 (L) 12/12/2017     Lab Results   Component Value Date    HGB 11.4 (L) 06/22/2020    WBC 10.23 06/22/2020    HCT 35.4 (L) 06/22/2020      Lab Results   Component Value Date    HGBA1C 5.6 02/11/2020     06/22/2020    BUN 27 (H) 08/17/2020     Lab Results   Component Value Date    LDLCALC 104.0 02/11/2020         Assessment:       1. CKD (chronic kidney disease) stage 3, GFR 30-59 ml/min    2. Hypertension, unspecified type    3. Acute renal insufficiency    4. Paroxysmal atrial fibrillation         Plan:   CKD stage 3B c eGFR 42-45 mL/min - likely 2/2 HTN nephrosclerosis and atherosclerotic disease, and age-related nephron loss. Has progressed this year but cause of progression is unclear; he did have a mildly elevated sCr prior to cardiac cath. sCr has remained stable since the cardiac cath though. Home BPs appear labile, which could also contribute to progression. Need to get UA, UPCR, US.    UPCR Needs repeat. On olmesartan 20 mg.   Acid-base WNL.   Renal osteodystrophy Ca okay. On vitamin D 1000 daily. Will repeat PTH, vit D for next visit.   Anemia Hgb at goal for CKD.   DM N/a.   Lipid Management Okay on statin per PCP.   ESRD planning Defer.      HTN - hypotensive and dizzy today; takes metoprolol succinate 25 mg, olmesartan 20mg. Recently taken off lasix due to renal function.  Lasix would actually be okay for edema with sCr of this level, but ideally SBP would remain > 110 to avoid hypoperfusion injuries to the kidneys; other BP meds may need adjustment to make room for lasix.  Will send to ER today due to symptomatic hypotension.    All questions patient had were answered.  Asked if further questions. None. F/u in clinic in 2 weeks  with labs and urine prior to next visit or sooner if needed.  ER for emergency  concerns.    Summary of Plan:  1. To ER today for symptomatic hypotension  2. UPCR, UA  3. Renal US due to recent decrease in function  4. F/u in 2 weeks to monitor BP and discuss CKD since hypotension took precedence today    Note: Report called to Diamante in ER.

## 2020-08-21 ENCOUNTER — PATIENT MESSAGE (OUTPATIENT)
Dept: FAMILY MEDICINE | Facility: CLINIC | Age: 75
End: 2020-08-21

## 2020-08-21 ENCOUNTER — HOSPITAL ENCOUNTER (OUTPATIENT)
Dept: RADIOLOGY | Facility: HOSPITAL | Age: 75
Discharge: HOME | DRG: 683 | End: 2020-08-21
Attending: INTERNAL MEDICINE
Payer: MEDICARE

## 2020-08-21 ENCOUNTER — TELEPHONE (OUTPATIENT)
Dept: WOUND CARE | Facility: CLINIC | Age: 75
End: 2020-08-21

## 2020-08-21 ENCOUNTER — OFFICE VISIT (OUTPATIENT)
Dept: WOUND CARE | Facility: CLINIC | Age: 75
DRG: 683 | End: 2020-08-21
Payer: MEDICARE

## 2020-08-21 VITALS
HEIGHT: 73 IN | SYSTOLIC BLOOD PRESSURE: 157 MMHG | BODY MASS INDEX: 41.75 KG/M2 | DIASTOLIC BLOOD PRESSURE: 71 MMHG | TEMPERATURE: 97 F | HEART RATE: 92 BPM | WEIGHT: 315 LBS

## 2020-08-21 DIAGNOSIS — I83.899 VARICOSE VEINS OF LEG WITH COMPLICATIONS: ICD-10-CM

## 2020-08-21 DIAGNOSIS — T81.89XA DELAYED SURGICAL WOUND HEALING, INITIAL ENCOUNTER: Primary | ICD-10-CM

## 2020-08-21 DIAGNOSIS — M25.572 ACUTE LEFT ANKLE PAIN: ICD-10-CM

## 2020-08-21 DIAGNOSIS — I87.2 VENOUS STASIS DERMATITIS OF BOTH LOWER EXTREMITIES: ICD-10-CM

## 2020-08-21 DIAGNOSIS — N28.9 ACUTE RENAL INSUFFICIENCY: ICD-10-CM

## 2020-08-21 PROCEDURE — 29580 STRAPPING UNNA BOOT: CPT | Mod: HCNC,LT,S$GLB, | Performed by: NURSE PRACTITIONER

## 2020-08-21 PROCEDURE — 3288F PR FALLS RISK ASSESSMENT DOCUMENTED: ICD-10-PCS | Mod: HCNC,CPTII,S$GLB, | Performed by: NURSE PRACTITIONER

## 2020-08-21 PROCEDURE — 3078F PR MOST RECENT DIASTOLIC BLOOD PRESSURE < 80 MM HG: ICD-10-PCS | Mod: HCNC,CPTII,S$GLB, | Performed by: NURSE PRACTITIONER

## 2020-08-21 PROCEDURE — 99999 PR PBB SHADOW E&M-EST. PATIENT-LVL V: ICD-10-PCS | Mod: PBBFAC,HCNC,, | Performed by: NURSE PRACTITIONER

## 2020-08-21 PROCEDURE — 3288F FALL RISK ASSESSMENT DOCD: CPT | Mod: HCNC,CPTII,S$GLB, | Performed by: NURSE PRACTITIONER

## 2020-08-21 PROCEDURE — 99999 PR PBB SHADOW E&M-EST. PATIENT-LVL V: CPT | Mod: PBBFAC,HCNC,, | Performed by: NURSE PRACTITIONER

## 2020-08-21 PROCEDURE — 3008F PR BODY MASS INDEX (BMI) DOCUMENTED: ICD-10-PCS | Mod: HCNC,CPTII,S$GLB, | Performed by: NURSE PRACTITIONER

## 2020-08-21 PROCEDURE — 1125F PR PAIN SEVERITY QUANTIFIED, PAIN PRESENT: ICD-10-PCS | Mod: HCNC,S$GLB,, | Performed by: NURSE PRACTITIONER

## 2020-08-21 PROCEDURE — 1100F PR PT FALLS ASSESS DOC 2+ FALLS/FALL W/INJURY/YR: ICD-10-PCS | Mod: HCNC,CPTII,S$GLB, | Performed by: NURSE PRACTITIONER

## 2020-08-21 PROCEDURE — 76770 US RETROPERITONEAL COMPLETE: ICD-10-PCS | Mod: 26,HCNC,, | Performed by: INTERNAL MEDICINE

## 2020-08-21 PROCEDURE — 3008F BODY MASS INDEX DOCD: CPT | Mod: HCNC,CPTII,S$GLB, | Performed by: NURSE PRACTITIONER

## 2020-08-21 PROCEDURE — 1159F PR MEDICATION LIST DOCUMENTED IN MEDICAL RECORD: ICD-10-PCS | Mod: HCNC,S$GLB,, | Performed by: NURSE PRACTITIONER

## 2020-08-21 PROCEDURE — 99213 PR OFFICE/OUTPT VISIT, EST, LEVL III, 20-29 MIN: ICD-10-PCS | Mod: 25,HCNC,S$GLB, | Performed by: NURSE PRACTITIONER

## 2020-08-21 PROCEDURE — 3077F SYST BP >= 140 MM HG: CPT | Mod: HCNC,CPTII,S$GLB, | Performed by: NURSE PRACTITIONER

## 2020-08-21 PROCEDURE — 1125F AMNT PAIN NOTED PAIN PRSNT: CPT | Mod: HCNC,S$GLB,, | Performed by: NURSE PRACTITIONER

## 2020-08-21 PROCEDURE — 1159F MED LIST DOCD IN RCRD: CPT | Mod: HCNC,S$GLB,, | Performed by: NURSE PRACTITIONER

## 2020-08-21 PROCEDURE — 76770 US EXAM ABDO BACK WALL COMP: CPT | Mod: TC,HCNC

## 2020-08-21 PROCEDURE — 3077F PR MOST RECENT SYSTOLIC BLOOD PRESSURE >= 140 MM HG: ICD-10-PCS | Mod: HCNC,CPTII,S$GLB, | Performed by: NURSE PRACTITIONER

## 2020-08-21 PROCEDURE — 3078F DIAST BP <80 MM HG: CPT | Mod: HCNC,CPTII,S$GLB, | Performed by: NURSE PRACTITIONER

## 2020-08-21 PROCEDURE — 76770 US EXAM ABDO BACK WALL COMP: CPT | Mod: 26,HCNC,, | Performed by: INTERNAL MEDICINE

## 2020-08-21 PROCEDURE — 99213 OFFICE O/P EST LOW 20 MIN: CPT | Mod: 25,HCNC,S$GLB, | Performed by: NURSE PRACTITIONER

## 2020-08-21 PROCEDURE — 29580 PR STRAPPING UNNA BOOT: ICD-10-PCS | Mod: HCNC,LT,S$GLB, | Performed by: NURSE PRACTITIONER

## 2020-08-21 PROCEDURE — 1100F PTFALLS ASSESS-DOCD GE2>/YR: CPT | Mod: HCNC,CPTII,S$GLB, | Performed by: NURSE PRACTITIONER

## 2020-08-21 RX ORDER — ACETAMINOPHEN AND CODEINE PHOSPHATE 300; 30 MG/1; MG/1
1 TABLET ORAL EVERY 8 HOURS PRN
Qty: 21 TABLET | Refills: 0 | Status: SHIPPED | OUTPATIENT
Start: 2020-08-21 | End: 2020-08-28

## 2020-08-21 RX ORDER — TRIAMCINOLONE ACETONIDE 1 MG/G
CREAM TOPICAL 2 TIMES DAILY
Qty: 453.6 G | Refills: 0 | Status: SHIPPED | OUTPATIENT
Start: 2020-08-21 | End: 2020-12-17 | Stop reason: SDUPTHER

## 2020-08-21 NOTE — LETTER
August 21, 2020      Bianca Kumar MD  [address]  [facility] [address]           [facility]  [address]  [facility] [address]  Phone: [phone]          Patient: Janusz Montgomery Jr.   MR Number: [id number]   YOB: 1945   Date of Visit: 8/21/2020       Dear Dr. Bianca Kumar:    Thank you for referring Janusz Montgomery to me for evaluation. Attached you will find relevant portions of my assessment and plan of care.    If you have questions, please do not hesitate to call me. I look forward to following Janusz Montgomery along with you.    Sincerely,    Marzena Mancilla, NP    Enclosure  CC:  No Recipients    If you would like to receive this communication electronically, please contact [email or web address] or [phone] to request more information on [facility] Link access.    For providers and/or their staff who would like to refer a patient to [facility], please contact us through our one-stop-shop provider referral line, [facility], at [phone].    If you feel you have received this communication in error or would no longer like to receive these types of communications, please e-mail [email or web address]

## 2020-08-21 NOTE — PROGRESS NOTES
Subjective:       Patient ID: Janusz Montgomery Jr. is a 75 y.o. male.    Chief Complaint: Wound Check    Wound Check    This is a 75 year old male referred by Dr. Kumar for evaluation and management of an open wound to the left lower leg.  He was seen by Dr. Kumar on 6/12/20 at which time he presented for evaluation of his wound which had been present for the past year. He requested and underwent a biopsy of the leg wound on that date.  The biopsy was positive for BCC.  He used efudex for about a month.  He was seen at [facility] on 6/17/20 and kirsten and a coflex compression wrap was applied to the leg.  He currently is using vaseline on the wound but no form of compression.  He also has neuropathy and is on gabapentin.  He is afebrile. He denies increased redness, swelling or purulent drainage.  His pain level is 5/10.    Patient Active Problem List   Diagnosis    Essential hypertension    Other hyperlipidemia    Coronary artery disease involving native coronary artery of native heart without angina pectoris s/p RCA stent    Obstructive sleep apnea    GERD (gastroesophageal reflux disease)    Benign prostatic hyperplasia with urinary obstruction    Morbid obesity with body mass index of 45.0-49.9 in adult    Prostate cancer    Paroxysmal atrial fibrillation     Major depressive disorder, recurrent episode, mild    Generalized anxiety disorder    History of gout    Herpes simplex keratoconjunctivitis    Decreased range of motion of left ankle    Right leg weakness    Impaired mobility    Balance problems    PVD (peripheral vascular disease)    Bilateral leg edema    Bilateral carotid artery stenosis    Gait abnormality    Left leg weakness    Delayed sleep phase syndrome    Floppy eyelid syndrome    Dyspnea    Insomnia    Restrictive lung disease    Personal history of asbestosis    CAD (coronary artery disease)    Varicose veins of leg with complications     "Early satiety    Delayed surgical wound healing    Venous stasis dermatitis of both lower extremities     Past Medical History:   Diagnosis Date    NAT (acute kidney injury) 3/19/2017    ALLERGIC RHINITIS     Anemia     Anxiety     Basal cell carcinoma 10/19/2018    forehead and right medial shoulder    Basal cell carcinoma 01/09/2019    left nasal bridge and left posterior ear    Chronic rhinitis 5/3/2013    Chronic rhinitis 5/3/2013    Coronary artery disease involving native coronary artery of native heart without angina pectoris s/p RCA stent     Cortical cataract of both eyes 3/18/2016    Delayed sleep phase syndrome 3/13/2019    Depression     Erectile dysfunction 3/24/2014    Erectile dysfunction 3/24/2014    Essential hypertension     GERD (gastroesophageal reflux disease) 7/25/2012    Gout, arthritis     Grade III open fracture of left tibia and fibula s/p ex-fix on 7/1/16 and ORIF of left tibia on 7/15 7/6/2016    H/O: iritis     Helicobacter pylori (H. pylori) infection     Treated    Herpes simplex keratoconjunctivitis 9/30/2015    - on acyclovir - followed by opthalmology, Dr. Uirbe     Herpes simplex keratoconjunctivitis 9/30/2015    - on acyclovir - followed by opthalmology, Dr. Uribe     Hyperkalemia 2/28/2017    Hyperlipidemia     Hypogonadism male     Hypogonadism male     Mixed anxiety and depressive disorder     Morbid obesity     Obstructive sleep apnea on CPAP     CPAP    Osteoarthritis of left knee 7/25/2012    Paroxysmal atrial fibrillation 7/6/2016    Primary osteoarthritis of left knee 7/25/2012    Prominent aorta 1/25/2016    "RESULTS: THE HEART IS MILDLY ENLARGED WITH A SLIGHTLY PROMINENT AORTA" - Xray Chest PA & Lateral 12-     Prostate cancer 2/15/2016    - followed by urology, Dr. Young     Prostate cancer 2/15/2016    - followed by urology, Dr. 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 Conteh    CATARACT EXTRACTION W/  INTRAOCULAR LENS IMPLANT Left 4/12/2016        COLONOSCOPY N/A 7/23/2020    Procedure: COLONOSCOPY;  Surgeon: Nico Lackey MD;  Location: [facility] ([address] [place] [address]);  Service: Endoscopy;  Laterality: N/A;    ESOPHAGOGASTRODUODENOSCOPY N/A 7/23/2020    Procedure: EGD (ESOPHAGOGASTRODUODENOSCOPY);  Surgeon: Nico Lackey MD;  Location: [facility] ([address] [place] [address]);  Service: Endoscopy;  Laterality: N/A;  per Dr Lackey-Will proceed with EGD and colonoscopy on the 2nd floor due to his comorbidities including obesity, sleep apnea, restrictive lung disease, coronary artery disease.  has loop recorder      ok to hold Eliquis 2 days per Dr Sandhu-must remain    EXTERNAL FIXATION TIBIAL FRACTURE Left 07/01/2016    INSERTION OF IMPLANTABLE LOOP RECORDER  04/07/2017    LEFT HEART CATHETERIZATION Left 1/30/2020    Procedure: Left heart cath;  Surgeon: Benjamin Sandhu MD;  Location: [facility] CATH LAB;  Service: Cardiology;  Laterality: Left;  RN PREOP 1/28/20--Pt starting Plavix loading dose today (8pills)- Dr. Sandhu aware.  Pt has a bandaged "non healing area to LLE"--Dr. Sandhu aware.    LEFT HEART CATHETERIZATION Left 2/14/2020    Procedure: Left heart cath, IVUS guided left main / LAD PCI. Noon start, radial approach;  Surgeon: Miugel Angel Brady MD;  Location: [facility] CATH LAB;  Service: Cardiology;  Laterality: Left;  RN PRE OP 2-7-2020  BMI--45.61    ORIF TIBIA " FRACTURE Left 07/15/2016    RADIOACTIVE SEED IMPLANTATION OF PROSTATE N/A 8/8/2018    Procedure: INSERTION, RADIOACTIVE SEED, PROSTATE;  Surgeon: Bipin Thompson MD;  Location: [facility] OR [address] [place] [address];  Service: Urology;  Laterality: N/A;  1 hour    Squamous cell cancer removal x3 with Mohs surgery      TONSILLECTOMY      TOTAL KNEE ARTHROPLASTY  10/2012    trus/bx      ULTRASOUND GUIDANCE  2/14/2020    Procedure: Ultrasound Guidance;  Surgeon: Miguel Angel Brady MD;  Location: [facility] CATH LAB;  Service: Cardiology;;     Review of Systems   Constitutional: Negative for chills, diaphoresis and fever.   HENT: Positive for hearing loss (Wears hearing aids) and rhinorrhea. Negative for postnasal drip, sinus pressure, sneezing, sore throat, tinnitus and trouble swallowing.    Eyes: Positive for visual disturbance.   Respiratory: Positive for apnea (on CPAP) and shortness of breath. Negative for cough and wheezing.    Cardiovascular: Positive for leg swelling. Negative for chest pain and palpitations.   Gastrointestinal: Negative for constipation, diarrhea, nausea and vomiting.   Genitourinary: Positive for frequency. Negative for difficulty urinating, dysuria and hematuria.   Musculoskeletal: Positive for arthralgias (right thumb, neck). Negative for back pain and joint swelling.   Skin: Positive for wound.   Neurological: Positive for dizziness. Negative for weakness, light-headedness and headaches.   Hematological: Bruises/bleeds easily.   Psychiatric/Behavioral: Positive for decreased concentration and dysphoric mood. Negative for confusion and sleep disturbance. The patient is nervous/anxious.        Objective:      Physical Exam  Vitals signs and nursing note reviewed.   Constitutional:       General: He is not in acute distress.     Appearance: He is well-developed. He is not diaphoretic.   HENT:      Head: Normocephalic and atraumatic.   Eyes:      General: No scleral icterus.        Right eye: No discharge.         Left  eye: No discharge.      Conjunctiva/sclera: Conjunctivae normal.      Pupils: Pupils are equal, round, and reactive to light.   Neck:      Musculoskeletal: Normal range of motion and neck supple.      Thyroid: No thyromegaly.      Vascular: No JVD.      Trachea: No tracheal deviation.   Cardiovascular:      Rate and Rhythm: Normal rate and regular rhythm.      Heart sounds: Normal heart sounds. No murmur. No friction rub. No gallop.    Pulmonary:      Effort: Pulmonary effort is normal. No respiratory distress.      Breath sounds: Normal breath sounds. No wheezing or rales.   Abdominal:      General: Bowel sounds are normal. There is no distension.      Palpations: Abdomen is soft.      Tenderness: There is no abdominal tenderness.   Musculoskeletal: Normal range of motion.         General: No tenderness.        Legs:    Skin:     General: Skin is warm and dry.      Findings: No erythema or rash.   Neurological:      Mental Status: He is alert and oriented to person, place, and time.   Psychiatric:         Behavior: Behavior normal.         Thought Content: Thought content normal.         Judgment: Judgment normal.             Janusz was seen in the clinic room and placed in the supine position on the treatment table.  The left leg was cleansed with Easi-clense sponges and dried thoroughly.  Eucerin cream was applied to the lower legs. Calmoseptine was applied to the periwound area. Medihoney gel and a hydrofiber dressing was applied to the wound and covered with an ABD pad.  The patient's foot was positioned at a 90 degree angle.  The coflex with calamine was applied per package instructions.  A two layered application was performed using a spiral technique beginning with the foam layer followed by the cohesive bandage avoiding creases or folds.  The wrap was started behind the first metatarsal and ended below the tibial tubercle of the knee.  There was overlap of each turn half the width of the previous turn.  The  compression wrap will be changed every 7 days.    Assessment:       1. Delayed surgical wound healing, initial encounter    2. Varicose veins of leg with complications    3. Venous stasis dermatitis of both lower extremities               Plan:            Increase gabapentin to 300 mg three times daily.  Triamncinolone cream to affected area on legs.  Coflex compression wrap with calamine left lower leg as detailed above.  Patient was warned not to get the dressings wet and to use cast covers for showering.  Should the dressing become wet, he is to remove it, place a wet-to-dry dressing over the wound, cover with gauze and roll gauze and use ace wraps for compression and to secure bandages.  He should then notify this office as soon as possible to have a new dressing applied.  Return to clinic in one week.

## 2020-08-21 NOTE — TELEPHONE ENCOUNTER
Returned call and spoke with patient who advised when he lays down he has a burning sensation.  I advised the patient to elevate his legs above the level of his heart for 45 minutes and Jonna advised patient to contact PCP regarding his pain.  I asked the patient if he had increased his Gabapentin as Jonna instructed and the patient stated he took two when he returned home.  The patient was advised that he has to consistently take the Gabapentin so it will build up in his system to be effective. I advised the patient that I will call him next week to check on his status.

## 2020-08-21 NOTE — TELEPHONE ENCOUNTER
----- Message from Sukhi Stack sent at 8/21/2020  1:38 PM CDT -----  Regarding: Urgent call back asap        The Pt states that he was at his appt with you this morning and the leg was bandaged up and now is hurting him real bad.  The Pt states that he took Tylenol and it isn't helping.  The Pt is wondering if the bandage is to tight.    Please contact the Pt to give advice.    Phone # [phone]

## 2020-08-24 ENCOUNTER — IMMUNIZATION (OUTPATIENT)
Dept: PHARMACY | Facility: CLINIC | Age: 75
End: 2020-08-24
Payer: MEDICARE

## 2020-08-24 ENCOUNTER — OFFICE VISIT (OUTPATIENT)
Dept: NEPHROLOGY | Facility: CLINIC | Age: 75
End: 2020-08-24
Payer: MEDICARE

## 2020-08-24 ENCOUNTER — HOSPITAL ENCOUNTER (INPATIENT)
Facility: HOSPITAL | Age: 75
LOS: 3 days | Discharge: HOME | DRG: 683 | End: 2020-08-27
Attending: EMERGENCY MEDICINE | Admitting: EMERGENCY MEDICINE
Payer: MEDICARE

## 2020-08-24 VITALS
WEIGHT: 315 LBS | DIASTOLIC BLOOD PRESSURE: 60 MMHG | OXYGEN SATURATION: 96 % | BODY MASS INDEX: 41.75 KG/M2 | HEART RATE: 68 BPM | HEIGHT: 73 IN | SYSTOLIC BLOOD PRESSURE: 90 MMHG

## 2020-08-24 DIAGNOSIS — I95.9 HYPOTENSION, UNSPECIFIED HYPOTENSION TYPE: ICD-10-CM

## 2020-08-24 DIAGNOSIS — R06.02 SOB (SHORTNESS OF BREATH): ICD-10-CM

## 2020-08-24 DIAGNOSIS — N18.30 CKD (CHRONIC KIDNEY DISEASE) STAGE 3, GFR 30-59 ML/MIN: Primary | ICD-10-CM

## 2020-08-24 DIAGNOSIS — N17.9 ACUTE KIDNEY INJURY SUPERIMPOSED ON CHRONIC KIDNEY DISEASE: ICD-10-CM

## 2020-08-24 DIAGNOSIS — I10 HYPERTENSION, UNSPECIFIED TYPE: ICD-10-CM

## 2020-08-24 DIAGNOSIS — N28.9 ACUTE RENAL INSUFFICIENCY: ICD-10-CM

## 2020-08-24 DIAGNOSIS — I95.9 HYPOTENSION, UNSPECIFIED HYPOTENSION TYPE: Primary | ICD-10-CM

## 2020-08-24 DIAGNOSIS — M79.644 CHRONIC PAIN OF RIGHT THUMB: Primary | ICD-10-CM

## 2020-08-24 DIAGNOSIS — G89.29 CHRONIC PAIN OF RIGHT THUMB: Primary | ICD-10-CM

## 2020-08-24 DIAGNOSIS — N17.9 ACUTE KIDNEY INJURY: ICD-10-CM

## 2020-08-24 DIAGNOSIS — N18.9 ACUTE KIDNEY INJURY SUPERIMPOSED ON CHRONIC KIDNEY DISEASE: ICD-10-CM

## 2020-08-24 DIAGNOSIS — I48.0 PAROXYSMAL ATRIAL FIBRILLATION: ICD-10-CM

## 2020-08-24 DIAGNOSIS — R42 DIZZINESS: ICD-10-CM

## 2020-08-24 DIAGNOSIS — R42 LIGHTHEADEDNESS: ICD-10-CM

## 2020-08-24 DIAGNOSIS — I83.899 VARICOSE VEINS OF LEG WITH COMPLICATIONS: ICD-10-CM

## 2020-08-24 DIAGNOSIS — E66.01 MORBID OBESITY WITH BODY MASS INDEX OF 45.0-49.9 IN ADULT: ICD-10-CM

## 2020-08-24 LAB
ALBUMIN SERPL BCP-MCNC: 3.6 G/DL (ref 3.5–5.2)
ALP SERPL-CCNC: 125 U/L (ref 55–135)
ALT SERPL W/O P-5'-P-CCNC: 17 U/L (ref 10–44)
ANION GAP SERPL CALC-SCNC: 9 MMOL/L (ref 8–16)
AST SERPL-CCNC: 13 U/L (ref 10–40)
BASOPHILS # BLD AUTO: 0.07 K/UL (ref 0–0.2)
BASOPHILS NFR BLD: 0.5 % (ref 0–1.9)
BILIRUB SERPL-MCNC: 0.6 MG/DL (ref 0.1–1)
BILIRUB UR QL STRIP: NEGATIVE
BUN SERPL-MCNC: 52 MG/DL (ref 8–23)
CALCIUM SERPL-MCNC: 7.9 MG/DL (ref 8.7–10.5)
CHLORIDE SERPL-SCNC: 108 MMOL/L (ref 95–110)
CLARITY UR REFRACT.AUTO: CLEAR
CO2 SERPL-SCNC: 20 MMOL/L (ref 23–29)
COLOR UR AUTO: YELLOW
CREAT SERPL-MCNC: 2.9 MG/DL (ref 0.5–1.4)
DIFFERENTIAL METHOD: ABNORMAL
EOSINOPHIL # BLD AUTO: 0.4 K/UL (ref 0–0.5)
EOSINOPHIL NFR BLD: 3 % (ref 0–8)
ERYTHROCYTE [DISTWIDTH] IN BLOOD BY AUTOMATED COUNT: 13.8 % (ref 11.5–14.5)
EST. GFR  (AFRICAN AMERICAN): 23.7 ML/MIN/1.73 M^2
EST. GFR  (NON AFRICAN AMERICAN): 20.5 ML/MIN/1.73 M^2
GLUCOSE SERPL-MCNC: 98 MG/DL (ref 70–110)
GLUCOSE UR QL STRIP: NEGATIVE
HCT VFR BLD AUTO: 33.7 % (ref 40–54)
HGB BLD-MCNC: 10.9 G/DL (ref 14–18)
HGB UR QL STRIP: NEGATIVE
IMM GRANULOCYTES # BLD AUTO: 0.02 K/UL (ref 0–0.04)
IMM GRANULOCYTES NFR BLD AUTO: 0.2 % (ref 0–0.5)
KETONES UR QL STRIP: NEGATIVE
LACTATE SERPL-SCNC: 0.9 MMOL/L (ref 0.5–2.2)
LEUKOCYTE ESTERASE UR QL STRIP: NEGATIVE
LYMPHOCYTES # BLD AUTO: 2.5 K/UL (ref 1–4.8)
LYMPHOCYTES NFR BLD: 18.9 % (ref 18–48)
MAGNESIUM SERPL-MCNC: 1.9 MG/DL (ref 1.6–2.6)
MCH RBC QN AUTO: 33.9 PG (ref 27–31)
MCHC RBC AUTO-ENTMCNC: 32.3 G/DL (ref 32–36)
MCV RBC AUTO: 105 FL (ref 82–98)
MONOCYTES # BLD AUTO: 0.8 K/UL (ref 0.3–1)
MONOCYTES NFR BLD: 6.3 % (ref 4–15)
NEUTROPHILS # BLD AUTO: 9.3 K/UL (ref 1.8–7.7)
NEUTROPHILS NFR BLD: 71.1 % (ref 38–73)
NITRITE UR QL STRIP: NEGATIVE
NRBC BLD-RTO: 0 /100 WBC
PH UR STRIP: 5 [PH] (ref 5–8)
PHOSPHATE SERPL-MCNC: 5.1 MG/DL (ref 2.7–4.5)
PLATELET # BLD AUTO: 149 K/UL (ref 150–350)
PMV BLD AUTO: 10.9 FL (ref 9.2–12.9)
POTASSIUM SERPL-SCNC: 5.2 MMOL/L (ref 3.5–5.1)
PROT SERPL-MCNC: 6.8 G/DL (ref 6–8.4)
PROT UR QL STRIP: NEGATIVE
RBC # BLD AUTO: 3.22 M/UL (ref 4.6–6.2)
SARS-COV-2 RDRP RESP QL NAA+PROBE: NEGATIVE
SODIUM SERPL-SCNC: 137 MMOL/L (ref 136–145)
SP GR UR STRIP: 1.01 (ref 1–1.03)
TROPONIN I SERPL DL<=0.01 NG/ML-MCNC: 0.01 NG/ML (ref 0–0.03)
URN SPEC COLLECT METH UR: NORMAL
WBC # BLD AUTO: 13.14 K/UL (ref 3.9–12.7)

## 2020-08-24 PROCEDURE — 25000003 PHARM REV CODE 250: Mod: HCNC | Performed by: HOSPITALIST

## 2020-08-24 PROCEDURE — 93010 EKG 12-LEAD: ICD-10-PCS | Mod: HCNC,,, | Performed by: INTERNAL MEDICINE

## 2020-08-24 PROCEDURE — 83735 ASSAY OF MAGNESIUM: CPT | Mod: HCNC

## 2020-08-24 PROCEDURE — 99499 RISK ADDL DX/OHS AUDIT: ICD-10-PCS | Mod: S$GLB,,, | Performed by: NURSE PRACTITIONER

## 2020-08-24 PROCEDURE — U0002 COVID-19 LAB TEST NON-CDC: HCPCS | Mod: HCNC

## 2020-08-24 PROCEDURE — 99999 PR PBB SHADOW E&M-EST. PATIENT-LVL V: ICD-10-PCS | Mod: PBBFAC,HCNC,, | Performed by: NURSE PRACTITIONER

## 2020-08-24 PROCEDURE — 1126F PR PAIN SEVERITY QUANTIFIED, NO PAIN PRESENT: ICD-10-PCS | Mod: HCNC,S$GLB,, | Performed by: NURSE PRACTITIONER

## 2020-08-24 PROCEDURE — 3074F SYST BP LT 130 MM HG: CPT | Mod: HCNC,CPTII,S$GLB, | Performed by: NURSE PRACTITIONER

## 2020-08-24 PROCEDURE — 96365 THER/PROPH/DIAG IV INF INIT: CPT | Mod: HCNC

## 2020-08-24 PROCEDURE — 84156 ASSAY OF PROTEIN URINE: CPT | Mod: HCNC

## 2020-08-24 PROCEDURE — 81003 URINALYSIS AUTO W/O SCOPE: CPT | Mod: HCNC

## 2020-08-24 PROCEDURE — 3008F BODY MASS INDEX DOCD: CPT | Mod: HCNC,CPTII,S$GLB, | Performed by: NURSE PRACTITIONER

## 2020-08-24 PROCEDURE — 1126F AMNT PAIN NOTED NONE PRSNT: CPT | Mod: HCNC,S$GLB,, | Performed by: NURSE PRACTITIONER

## 2020-08-24 PROCEDURE — 25000003 PHARM REV CODE 250: Mod: HCNC | Performed by: STUDENT IN AN ORGANIZED HEALTH CARE EDUCATION/TRAINING PROGRAM

## 2020-08-24 PROCEDURE — 1101F PR PT FALLS ASSESS DOC 0-1 FALLS W/OUT INJ PAST YR: ICD-10-PCS | Mod: HCNC,CPTII,S$GLB, | Performed by: NURSE PRACTITIONER

## 2020-08-24 PROCEDURE — 3008F PR BODY MASS INDEX (BMI) DOCUMENTED: ICD-10-PCS | Mod: HCNC,CPTII,S$GLB, | Performed by: NURSE PRACTITIONER

## 2020-08-24 PROCEDURE — 63600175 PHARM REV CODE 636 W HCPCS: Mod: HCNC | Performed by: EMERGENCY MEDICINE

## 2020-08-24 PROCEDURE — 93010 ELECTROCARDIOGRAM REPORT: CPT | Mod: HCNC,,, | Performed by: INTERNAL MEDICINE

## 2020-08-24 PROCEDURE — 1101F PT FALLS ASSESS-DOCD LE1/YR: CPT | Mod: HCNC,CPTII,S$GLB, | Performed by: NURSE PRACTITIONER

## 2020-08-24 PROCEDURE — 83605 ASSAY OF LACTIC ACID: CPT | Mod: HCNC

## 2020-08-24 PROCEDURE — 99291 CRITICAL CARE FIRST HOUR: CPT | Mod: HCNC,,, | Performed by: EMERGENCY MEDICINE

## 2020-08-24 PROCEDURE — 99999 PR PBB SHADOW E&M-EST. PATIENT-LVL V: CPT | Mod: PBBFAC,HCNC,, | Performed by: NURSE PRACTITIONER

## 2020-08-24 PROCEDURE — 27000190 HC CPAP FULL FACE MASK W/VALVE: Mod: HCNC

## 2020-08-24 PROCEDURE — 1159F PR MEDICATION LIST DOCUMENTED IN MEDICAL RECORD: ICD-10-PCS | Mod: HCNC,S$GLB,, | Performed by: NURSE PRACTITIONER

## 2020-08-24 PROCEDURE — 63700000 PHARM REV CODE 250 ALT 637 W/O HCPCS: Mod: HCNC | Performed by: EMERGENCY MEDICINE

## 2020-08-24 PROCEDURE — 99291 PR CRITICAL CARE, E/M 30-74 MINUTES: ICD-10-PCS | Mod: HCNC,,, | Performed by: EMERGENCY MEDICINE

## 2020-08-24 PROCEDURE — 3078F PR MOST RECENT DIASTOLIC BLOOD PRESSURE < 80 MM HG: ICD-10-PCS | Mod: HCNC,CPTII,S$GLB, | Performed by: NURSE PRACTITIONER

## 2020-08-24 PROCEDURE — 93005 ELECTROCARDIOGRAM TRACING: CPT | Mod: HCNC

## 2020-08-24 PROCEDURE — 84484 ASSAY OF TROPONIN QUANT: CPT | Mod: HCNC

## 2020-08-24 PROCEDURE — 85025 COMPLETE CBC W/AUTO DIFF WBC: CPT | Mod: HCNC

## 2020-08-24 PROCEDURE — 1159F MED LIST DOCD IN RCRD: CPT | Mod: HCNC,S$GLB,, | Performed by: NURSE PRACTITIONER

## 2020-08-24 PROCEDURE — 94660 CPAP INITIATION&MGMT: CPT | Mod: HCNC

## 2020-08-24 PROCEDURE — 94761 N-INVAS EAR/PLS OXIMETRY MLT: CPT | Mod: HCNC

## 2020-08-24 PROCEDURE — 96361 HYDRATE IV INFUSION ADD-ON: CPT | Mod: HCNC

## 2020-08-24 PROCEDURE — 99285 EMERGENCY DEPT VISIT HI MDM: CPT | Mod: 25,HCNC

## 2020-08-24 PROCEDURE — 99499 UNLISTED E&M SERVICE: CPT | Mod: S$GLB,,, | Performed by: NURSE PRACTITIONER

## 2020-08-24 PROCEDURE — 3078F DIAST BP <80 MM HG: CPT | Mod: HCNC,CPTII,S$GLB, | Performed by: NURSE PRACTITIONER

## 2020-08-24 PROCEDURE — 20600001 HC STEP DOWN PRIVATE ROOM: Mod: HCNC

## 2020-08-24 PROCEDURE — 99214 OFFICE O/P EST MOD 30 MIN: CPT | Mod: HCNC,S$GLB,, | Performed by: NURSE PRACTITIONER

## 2020-08-24 PROCEDURE — 84100 ASSAY OF PHOSPHORUS: CPT | Mod: HCNC

## 2020-08-24 PROCEDURE — 99214 PR OFFICE/OUTPT VISIT, EST, LEVL IV, 30-39 MIN: ICD-10-PCS | Mod: HCNC,S$GLB,, | Performed by: NURSE PRACTITIONER

## 2020-08-24 PROCEDURE — 99900035 HC TECH TIME PER 15 MIN (STAT): Mod: HCNC

## 2020-08-24 PROCEDURE — 80053 COMPREHEN METABOLIC PANEL: CPT | Mod: HCNC

## 2020-08-24 PROCEDURE — 3074F PR MOST RECENT SYSTOLIC BLOOD PRESSURE < 130 MM HG: ICD-10-PCS | Mod: HCNC,CPTII,S$GLB, | Performed by: NURSE PRACTITIONER

## 2020-08-24 RX ORDER — SODIUM CHLORIDE 0.9 % (FLUSH) 0.9 %
10 SYRINGE (ML) INJECTION
Status: DISCONTINUED | OUTPATIENT
Start: 2020-08-24 | End: 2020-08-27 | Stop reason: HOSPADM

## 2020-08-24 RX ORDER — ALBUTEROL SULFATE 90 UG/1
2 AEROSOL, METERED RESPIRATORY (INHALATION) EVERY 6 HOURS PRN
Status: DISCONTINUED | OUTPATIENT
Start: 2020-08-24 | End: 2020-08-27 | Stop reason: HOSPADM

## 2020-08-24 RX ORDER — ACETAMINOPHEN 325 MG/1
650 TABLET ORAL EVERY 8 HOURS PRN
Status: DISCONTINUED | OUTPATIENT
Start: 2020-08-24 | End: 2020-08-27 | Stop reason: HOSPADM

## 2020-08-24 RX ORDER — CLOPIDOGREL BISULFATE 75 MG/1
75 TABLET ORAL DAILY
Status: DISCONTINUED | OUTPATIENT
Start: 2020-08-25 | End: 2020-08-27 | Stop reason: HOSPADM

## 2020-08-24 RX ORDER — OXYBUTYNIN CHLORIDE 5 MG/1
5 TABLET, EXTENDED RELEASE ORAL DAILY
Status: DISCONTINUED | OUTPATIENT
Start: 2020-08-25 | End: 2020-08-27 | Stop reason: HOSPADM

## 2020-08-24 RX ORDER — SODIUM CHLORIDE 9 MG/ML
500 INJECTION, SOLUTION INTRAVENOUS
Status: COMPLETED | OUTPATIENT
Start: 2020-08-24 | End: 2020-08-24

## 2020-08-24 RX ORDER — TALC
10 POWDER (GRAM) TOPICAL NIGHTLY
Status: DISCONTINUED | OUTPATIENT
Start: 2020-08-24 | End: 2020-08-24

## 2020-08-24 RX ORDER — ACETAMINOPHEN AND CODEINE PHOSPHATE 300; 30 MG/1; MG/1
1 TABLET ORAL EVERY 8 HOURS PRN
Status: DISCONTINUED | OUTPATIENT
Start: 2020-08-24 | End: 2020-08-27 | Stop reason: HOSPADM

## 2020-08-24 RX ORDER — GLUCAGON 1 MG
1 KIT INJECTION
Status: DISCONTINUED | OUTPATIENT
Start: 2020-08-24 | End: 2020-08-27 | Stop reason: HOSPADM

## 2020-08-24 RX ORDER — ACYCLOVIR 800 MG/1
800 TABLET ORAL 2 TIMES DAILY
Status: DISCONTINUED | OUTPATIENT
Start: 2020-08-24 | End: 2020-08-24

## 2020-08-24 RX ORDER — FLUTICASONE PROPIONATE 50 MCG
1 SPRAY, SUSPENSION (ML) NASAL DAILY
Status: DISCONTINUED | OUTPATIENT
Start: 2020-08-25 | End: 2020-08-27 | Stop reason: HOSPADM

## 2020-08-24 RX ORDER — AZELASTINE 1 MG/ML
1 SPRAY, METERED NASAL 2 TIMES DAILY
Status: DISCONTINUED | OUTPATIENT
Start: 2020-08-24 | End: 2020-08-27 | Stop reason: HOSPADM

## 2020-08-24 RX ORDER — IBUPROFEN 200 MG
16 TABLET ORAL
Status: DISCONTINUED | OUTPATIENT
Start: 2020-08-24 | End: 2020-08-27 | Stop reason: HOSPADM

## 2020-08-24 RX ORDER — IBUPROFEN 200 MG
24 TABLET ORAL
Status: DISCONTINUED | OUTPATIENT
Start: 2020-08-24 | End: 2020-08-27 | Stop reason: HOSPADM

## 2020-08-24 RX ORDER — TAMSULOSIN HYDROCHLORIDE 0.4 MG/1
1 CAPSULE ORAL DAILY
Status: DISCONTINUED | OUTPATIENT
Start: 2020-08-25 | End: 2020-08-27 | Stop reason: HOSPADM

## 2020-08-24 RX ORDER — GABAPENTIN 300 MG/1
300 CAPSULE ORAL 3 TIMES DAILY
Status: DISCONTINUED | OUTPATIENT
Start: 2020-08-25 | End: 2020-08-27 | Stop reason: HOSPADM

## 2020-08-24 RX ORDER — AZITHROMYCIN 250 MG/1
500 TABLET, FILM COATED ORAL
Status: COMPLETED | OUTPATIENT
Start: 2020-08-24 | End: 2020-08-24

## 2020-08-24 RX ORDER — VENLAFAXINE 75 MG/1
75 TABLET ORAL 2 TIMES DAILY
Status: DISCONTINUED | OUTPATIENT
Start: 2020-08-25 | End: 2020-08-27 | Stop reason: HOSPADM

## 2020-08-24 RX ORDER — ATORVASTATIN CALCIUM 20 MG/1
40 TABLET, FILM COATED ORAL DAILY
Status: DISCONTINUED | OUTPATIENT
Start: 2020-08-25 | End: 2020-08-27 | Stop reason: HOSPADM

## 2020-08-24 RX ORDER — TALC
6 POWDER (GRAM) TOPICAL NIGHTLY PRN
Status: DISCONTINUED | OUTPATIENT
Start: 2020-08-24 | End: 2020-08-27 | Stop reason: HOSPADM

## 2020-08-24 RX ORDER — OLMESARTAN MEDOXOMIL 20 MG/1
20 TABLET ORAL DAILY
Status: DISCONTINUED | OUTPATIENT
Start: 2020-08-25 | End: 2020-08-24

## 2020-08-24 RX ADMIN — SODIUM CHLORIDE 500 ML: 0.9 INJECTION, SOLUTION INTRAVENOUS at 05:08

## 2020-08-24 RX ADMIN — SODIUM CHLORIDE 500 ML: 0.9 INJECTION, SOLUTION INTRAVENOUS at 08:08

## 2020-08-24 RX ADMIN — MELATONIN TAB 3 MG 6 MG: 3 TAB at 11:08

## 2020-08-24 RX ADMIN — AZITHROMYCIN 500 MG: 250 TABLET, FILM COATED ORAL at 05:08

## 2020-08-24 RX ADMIN — AZELASTINE HYDROCHLORIDE 137 MCG: 137 SPRAY, METERED NASAL at 11:08

## 2020-08-24 RX ADMIN — APIXABAN 5 MG: 5 TABLET, FILM COATED ORAL at 11:08

## 2020-08-24 RX ADMIN — SODIUM CHLORIDE 500 ML: 0.9 INJECTION, SOLUTION INTRAVENOUS at 03:08

## 2020-08-24 RX ADMIN — CEFTRIAXONE 2 G: 2 INJECTION, SOLUTION INTRAVENOUS at 05:08

## 2020-08-24 NOTE — ED NOTES
Pt identifiers checked and accurate with Janusz Montgomery    Pt reports to ED from clinic for hypotension and dizziness x 1 day. Pt denies CP, SOB, abdominal pain, N/V/D, fever, chills.     LOC: The patient is awake, alert and aware of environment with an appropriate affect, the patient is oriented x 3 and speaking appropriately.  APPEARANCE: Patient resting comfortably and in no acute distress, patient is clean and well groomed, pt placed in hospital gown.   SKIN: The skin is warm and dry, color consistent with ethnicity, patient has normal skin turgor and moist mucus membranes, skin intact.  MUSCULOSKELETAL: Patient moving all extremities well, no obvious swelling or deformities noted.   RESPIRATORY: Airway is open and patent; respirations are spontaneous, patient has a normal effort and rate.   CARDIAC: Patient has peripheral edema noted to bilateral lower extremities, compression sock present to left leg upon arrival. No complaints of chest pain   ABDOMEN: Soft and non tender to palpation, no distention noted. Bowel sounds present x 4  NEUROLOGIC: eyes open spontaneously, behavior appropriate to situation, follows commands, facial expression symmetrical, purposeful motor response noted    Resident MD at the bedside.

## 2020-08-24 NOTE — PROVIDER PROGRESS NOTES - EMERGENCY DEPT.
Encounter Date: 8/24/2020    ED Physician Progress Notes        Physician Note:   5:23 PM  Assumed care of patient at sign out pending lactate, UA results, admission.     6:57 PM  Patient's labs notable for NAT (baseline 1.5, 2.9 today), mild hyperK. Patient states he has been drinking minimal water and only soda.   Patient received rocephin, I agree lower suspicion for sepsis and leukocytosis may be reactive.  Admit for NAT, continued management.   UA pending.

## 2020-08-24 NOTE — ED PROVIDER NOTES
"ED Resident HAND-OFF NOTE:  5:12 PM 8/24/2020  Janusz Montgomery Jr. is a 75 y.o. male who presented to the ED on 8/24/2020 and has been managed by Dr. Mccarthy and Dr. Billy, who reports patient C/O lightheadedness and weakness . I assumed care of patient from off-going ED physician team at 5:12 PM pending basic labs.  Patient was noted to have an NAT, leukocytosis 13, received IV antibiotics for presumed consolidation on x-ray from off going team, with plan for admission to Medicine for NAT requiring IV fluids.     On my evaluation, Janusz Montgomery Jr. appears well, hemodynamically stable and in NAD. Thus far, Janusz Montgomery Jr. has received:  Medications   0.9%  NaCl infusion (0 mLs Intravenous Stopped 8/24/20 1755)   cefTRIAXone (ROCEPHIN) 2 g/50 mL D5W IVPB (2 g Intravenous New Bag 8/24/20 1755)   azithromycin tablet 500 mg (500 mg Oral Given 8/24/20 1755)   0.9%  NaCl infusion (500 mLs Intravenous New Bag 8/24/20 1759)       On my exam, I appreciate:  BP (!) 117/53   Pulse 62   Temp 97.7 °F (36.5 °C) (Oral)   Resp (!) 22   Ht 6' 1" (1.854 m)   Wt (!) 155.1 kg (342 lb)   SpO2 97%   BMI 45.12 kg/m²   Constitutional: Well-appearing; Well-Nourished; Non-Toxic-appearing and in NAD.  Head/Face: AT/NC & No Facial asymmetry.  Oropharynx: Speaking Full Sentences with No drooling or slurring of speech.  Cardiovascular: Reg Rate; Reg Rhythm; No Murmurs.  Pulmonary/Chest: AT Thorax with Lungs CTA B/L.  Abdominal: Soft, ND, NT.  Musculoskeletal: FROM.  Neuro/Psych: Calm; Cooperative, Following Command, Answering Questions Appropriately.     Disposition:  Admitted to Internal Medicine for NAT  I have discussed and counseled Janusz Montgomery Jr. regarding exam, results, diagnosis, treatment, and plan.  ______________________  Emilia Whitehead MD   Emergency Medicine Resident  5:12 PM 8/24/2020           Emilia Whitehead MD  Resident  08/24/20 2034    "

## 2020-08-24 NOTE — LETTER
August 24, 2020      Miguelina Weathers MD  [address]  [place] [address]           [facility] - Nephrology 5th Fl  [address]  [facility] [address]  Phone: [phone]  Fax: [phone]          Patient: Janusz Montgomery Jr.   MR Number: [id number]   YOB: 1945   Date of Visit: 8/24/2020       Dear Dr. Miguelina Weathers:    Thank you for referring Janusz Montgomery to me for evaluation. Attached you will find relevant portions of my assessment and plan of care.    If you have questions, please do not hesitate to call me. I look forward to following Janusz Montgomery along with you.    Sincerely,    Malini Pagan, SHAKEEL    Enclosure  CC:  No Recipients    If you would like to receive this communication electronically, please contact [email or web address] or [phone] to request more information on [employer] access.    For providers and/or their staff who would like to refer a patient to [facility], please contact us through our one-stop-shop provider referral line, [facility], at [phone].    If you feel you have received this communication in error or would no longer like to receive these types of communications, please e-mail [email or web address]

## 2020-08-24 NOTE — PATIENT INSTRUCTIONS
To ER today for symptomatic hypotension.  Urine studies and Ultrasound before next appointment.  Return to clinic in 2 weeks.  Continue monitoring blood pressure at home.
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
## 2020-08-25 PROBLEM — N18.9 ACUTE KIDNEY INJURY SUPERIMPOSED ON CHRONIC KIDNEY DISEASE: Status: ACTIVE | Noted: 2020-08-24

## 2020-08-25 LAB
ANION GAP SERPL CALC-SCNC: 9 MMOL/L (ref 8–16)
BASOPHILS # BLD AUTO: 0.06 K/UL (ref 0–0.2)
BASOPHILS NFR BLD: 0.5 % (ref 0–1.9)
BUN SERPL-MCNC: 55 MG/DL (ref 8–23)
CALCIUM SERPL-MCNC: 8.1 MG/DL (ref 8.7–10.5)
CHLORIDE SERPL-SCNC: 111 MMOL/L (ref 95–110)
CK SERPL-CCNC: 66 U/L (ref 20–200)
CK SERPL-CCNC: 67 U/L (ref 20–200)
CO2 SERPL-SCNC: 22 MMOL/L (ref 23–29)
CREAT SERPL-MCNC: 2.9 MG/DL (ref 0.5–1.4)
CREAT UR-MCNC: 112 MG/DL (ref 23–375)
CREAT UR-MCNC: 36 MG/DL (ref 23–375)
DIFFERENTIAL METHOD: ABNORMAL
EOSINOPHIL # BLD AUTO: 0.4 K/UL (ref 0–0.5)
EOSINOPHIL NFR BLD: 3.3 % (ref 0–8)
ERYTHROCYTE [DISTWIDTH] IN BLOOD BY AUTOMATED COUNT: 13.8 % (ref 11.5–14.5)
EST. GFR  (AFRICAN AMERICAN): 23.7 ML/MIN/1.73 M^2
EST. GFR  (NON AFRICAN AMERICAN): 20.5 ML/MIN/1.73 M^2
GLUCOSE SERPL-MCNC: 86 MG/DL (ref 70–110)
HCT VFR BLD AUTO: 37.7 % (ref 40–54)
HGB BLD-MCNC: 11.7 G/DL (ref 14–18)
IMM GRANULOCYTES # BLD AUTO: 0.03 K/UL (ref 0–0.04)
IMM GRANULOCYTES NFR BLD AUTO: 0.2 % (ref 0–0.5)
LYMPHOCYTES # BLD AUTO: 2.6 K/UL (ref 1–4.8)
LYMPHOCYTES NFR BLD: 20.8 % (ref 18–48)
MAGNESIUM SERPL-MCNC: 2.1 MG/DL (ref 1.6–2.6)
MCH RBC QN AUTO: 33.3 PG (ref 27–31)
MCHC RBC AUTO-ENTMCNC: 31 G/DL (ref 32–36)
MCV RBC AUTO: 107 FL (ref 82–98)
MONOCYTES # BLD AUTO: 0.9 K/UL (ref 0.3–1)
MONOCYTES NFR BLD: 7.2 % (ref 4–15)
NEUTROPHILS # BLD AUTO: 8.5 K/UL (ref 1.8–7.7)
NEUTROPHILS NFR BLD: 68 % (ref 38–73)
NRBC BLD-RTO: 0 /100 WBC
PHOSPHATE SERPL-MCNC: 5.1 MG/DL (ref 2.7–4.5)
PLATELET # BLD AUTO: 167 K/UL (ref 150–350)
PMV BLD AUTO: 11.8 FL (ref 9.2–12.9)
POTASSIUM SERPL-SCNC: 5.1 MMOL/L (ref 3.5–5.1)
PROT UR-MCNC: 13 MG/DL (ref 0–15)
PROT/CREAT UR: 0.12 MG/G{CREAT} (ref 0–0.2)
RBC # BLD AUTO: 3.51 M/UL (ref 4.6–6.2)
SODIUM SERPL-SCNC: 142 MMOL/L (ref 136–145)
SODIUM UR-SCNC: 45 MMOL/L (ref 20–250)
UUN UR-MCNC: 309 MG/DL (ref 140–1050)
WBC # BLD AUTO: 12.57 K/UL (ref 3.9–12.7)

## 2020-08-25 PROCEDURE — 84540 ASSAY OF URINE/UREA-N: CPT | Mod: HCNC

## 2020-08-25 PROCEDURE — 82550 ASSAY OF CK (CPK): CPT | Mod: HCNC

## 2020-08-25 PROCEDURE — 25000003 PHARM REV CODE 250: Mod: HCNC | Performed by: STUDENT IN AN ORGANIZED HEALTH CARE EDUCATION/TRAINING PROGRAM

## 2020-08-25 PROCEDURE — 84100 ASSAY OF PHOSPHORUS: CPT | Mod: HCNC

## 2020-08-25 PROCEDURE — 99223 1ST HOSP IP/OBS HIGH 75: CPT | Mod: HCNC,AI,, | Performed by: HOSPITALIST

## 2020-08-25 PROCEDURE — 83735 ASSAY OF MAGNESIUM: CPT | Mod: HCNC

## 2020-08-25 PROCEDURE — 94761 N-INVAS EAR/PLS OXIMETRY MLT: CPT | Mod: HCNC

## 2020-08-25 PROCEDURE — 36415 COLL VENOUS BLD VENIPUNCTURE: CPT | Mod: HCNC

## 2020-08-25 PROCEDURE — 82570 ASSAY OF URINE CREATININE: CPT | Mod: HCNC

## 2020-08-25 PROCEDURE — 99900035 HC TECH TIME PER 15 MIN (STAT): Mod: HCNC

## 2020-08-25 PROCEDURE — 80048 BASIC METABOLIC PNL TOTAL CA: CPT | Mod: HCNC

## 2020-08-25 PROCEDURE — 94660 CPAP INITIATION&MGMT: CPT | Mod: HCNC

## 2020-08-25 PROCEDURE — 99223 PR INITIAL HOSPITAL CARE,LEVL III: ICD-10-PCS | Mod: HCNC,,, | Performed by: INTERNAL MEDICINE

## 2020-08-25 PROCEDURE — 84300 ASSAY OF URINE SODIUM: CPT | Mod: HCNC

## 2020-08-25 PROCEDURE — 20600001 HC STEP DOWN PRIVATE ROOM: Mod: HCNC

## 2020-08-25 PROCEDURE — 99223 1ST HOSP IP/OBS HIGH 75: CPT | Mod: HCNC,,, | Performed by: INTERNAL MEDICINE

## 2020-08-25 PROCEDURE — 25000242 PHARM REV CODE 250 ALT 637 W/ HCPCS: Mod: HCNC | Performed by: STUDENT IN AN ORGANIZED HEALTH CARE EDUCATION/TRAINING PROGRAM

## 2020-08-25 PROCEDURE — 85025 COMPLETE CBC W/AUTO DIFF WBC: CPT | Mod: HCNC

## 2020-08-25 PROCEDURE — 99223 PR INITIAL HOSPITAL CARE,LEVL III: ICD-10-PCS | Mod: HCNC,AI,, | Performed by: HOSPITALIST

## 2020-08-25 RX ORDER — ACYCLOVIR 800 MG/1
800 TABLET ORAL 2 TIMES DAILY
Status: DISCONTINUED | OUTPATIENT
Start: 2020-08-25 | End: 2020-08-27 | Stop reason: HOSPADM

## 2020-08-25 RX ORDER — HYDRALAZINE HYDROCHLORIDE 25 MG/1
25 TABLET, FILM COATED ORAL 3 TIMES DAILY PRN
Status: DISCONTINUED | OUTPATIENT
Start: 2020-08-25 | End: 2020-08-27 | Stop reason: HOSPADM

## 2020-08-25 RX ADMIN — TAMSULOSIN HYDROCHLORIDE 0.4 MG: 0.4 CAPSULE ORAL at 09:08

## 2020-08-25 RX ADMIN — APIXABAN 5 MG: 5 TABLET, FILM COATED ORAL at 09:08

## 2020-08-25 RX ADMIN — OXYBUTYNIN CHLORIDE 5 MG: 5 TABLET, EXTENDED RELEASE ORAL at 09:08

## 2020-08-25 RX ADMIN — VENLAFAXINE 75 MG: 75 TABLET ORAL at 09:08

## 2020-08-25 RX ADMIN — GABAPENTIN 300 MG: 300 CAPSULE ORAL at 02:08

## 2020-08-25 RX ADMIN — GABAPENTIN 300 MG: 300 CAPSULE ORAL at 09:08

## 2020-08-25 RX ADMIN — ATORVASTATIN CALCIUM 40 MG: 20 TABLET, FILM COATED ORAL at 09:08

## 2020-08-25 RX ADMIN — MELATONIN TAB 3 MG 6 MG: 3 TAB at 09:08

## 2020-08-25 RX ADMIN — ACYCLOVIR 800 MG: 800 TABLET ORAL at 09:08

## 2020-08-25 RX ADMIN — ACYCLOVIR 800 MG: 800 TABLET ORAL at 11:08

## 2020-08-25 RX ADMIN — CLOPIDOGREL 75 MG: 75 TABLET, FILM COATED ORAL at 09:08

## 2020-08-25 RX ADMIN — FLUTICASONE PROPIONATE 50 MCG: 50 SPRAY, METERED NASAL at 09:08
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
## 2020-08-25 NOTE — PLAN OF CARE
Problem: Adult Inpatient Plan of Care  Goal: Plan of Care Review  Outcome: Ongoing, Progressing     Problem: Adult Inpatient Plan of Care  Goal: Optimal Comfort and Wellbeing  Outcome: Ongoing, Progressing     POC reviewed with Pt. VSS. A&Ox4. Pt denies pain. Safety checks preformed. Call light in reach. All questions answered. Will continue to monitor.

## 2020-08-25 NOTE — ASSESSMENT & PLAN NOTE
possible NAT secondary to ATN from hypotension.     Plan:  - IVF if needed  - trend renal function  - renal eco   - UPCR, UA

## 2020-08-25 NOTE — SUBJECTIVE & OBJECTIVE
"Past Medical History:   Diagnosis Date    NAT (acute kidney injury) 3/19/2017    ALLERGIC RHINITIS     Anemia     Anxiety     Basal cell carcinoma 10/19/2018    forehead and right medial shoulder    Basal cell carcinoma 01/09/2019    left nasal bridge and left posterior ear    Chronic rhinitis 5/3/2013    Chronic rhinitis 5/3/2013    Coronary artery disease involving native coronary artery of native heart without angina pectoris s/p RCA stent     Cortical cataract of both eyes 3/18/2016    Delayed sleep phase syndrome 3/13/2019    Depression     Erectile dysfunction 3/24/2014    Erectile dysfunction 3/24/2014    Essential hypertension     GERD (gastroesophageal reflux disease) 7/25/2012    Gout, arthritis     Grade III open fracture of left tibia and fibula s/p ex-fix on 7/1/16 and ORIF of left tibia on 7/15 7/6/2016    H/O: iritis     Helicobacter pylori (H. pylori) infection     Treated    Herpes simplex keratoconjunctivitis 9/30/2015    - on acyclovir - followed by opthalmology, Dr. Uribe     Herpes simplex keratoconjunctivitis 9/30/2015    - on acyclovir - followed by opthalmology, Dr. Uribe     Hyperkalemia 2/28/2017    Hyperlipidemia     Hypogonadism male     Hypogonadism male     Mixed anxiety and depressive disorder     Morbid obesity     Obstructive sleep apnea on CPAP     CPAP    Osteoarthritis of left knee 7/25/2012    Paroxysmal atrial fibrillation 7/6/2016    Primary osteoarthritis of left knee 7/25/2012    Prominent aorta 1/25/2016    "RESULTS: THE HEART IS MILDLY ENLARGED WITH A SLIGHTLY PROMINENT AORTA" - Xray Chest PA & Lateral 12-     Prostate cancer 2/15/2016    - followed by urology, Dr. Young     Prostate cancer 2/15/2016    - followed by urology, Dr. 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 Conteh    CATARACT EXTRACTION W/  INTRAOCULAR LENS IMPLANT Left 4/12/2016        COLONOSCOPY N/A 7/23/2020    Procedure: COLONOSCOPY;  Surgeon: Nico Lackey MD;  Location: [facility] ([address] [place] [address]);  Service: Endoscopy;  Laterality: N/A;    ESOPHAGOGASTRODUODENOSCOPY N/A 7/23/2020    Procedure: EGD (ESOPHAGOGASTRODUODENOSCOPY);  Surgeon: Nico Lackey MD;  Location: [facility] ([address] [place] [address]);  Service: Endoscopy;  Laterality: N/A;  per Dr Lackey-Will proceed with EGD and colonoscopy on the 2nd floor due to his comorbidities including obesity, sleep apnea, restrictive lung disease, coronary artery disease.  has loop recorder      ok to hold Eliquis 2 days per Dr Sandhu-must remain    EXTERNAL FIXATION TIBIAL FRACTURE Left 07/01/2016    INSERTION OF IMPLANTABLE LOOP RECORDER  04/07/2017    LEFT HEART CATHETERIZATION Left 1/30/2020    Procedure: Left heart cath;  Surgeon: Benjamin Sandhu MD;  Location: [facility] CATH LAB;  Service: Cardiology;  Laterality: Left;  RN PREOP 1/28/20--Pt starting Plavix loading dose today (8pills)- Dr. Sandhu aware.  Pt has a bandaged "non healing area to LLE"--Dr. Sandhu aware.    LEFT HEART CATHETERIZATION Left 2/14/2020    Procedure: Left heart cath, IVUS guided left main / LAD PCI. Noon start, radial approach;  Surgeon: Miguel Angel Brady MD;  Location: [facility] CATH LAB;  Service: Cardiology;  Laterality: Left;  RN PRE OP 2-7-2020  BMI--45.61    ORIF TIBIA FRACTURE Left 07/15/2016    RADIOACTIVE SEED IMPLANTATION OF PROSTATE N/A 8/8/2018    Procedure: " INSERTION, RADIOACTIVE SEED, PROSTATE;  Surgeon: Bipin Thompson MD;  Location: [facility] OR [address] [place] [address];  Service: Urology;  Laterality: N/A;  1 hour    Squamous cell cancer removal x3 with Mohs surgery      TONSILLECTOMY      TOTAL KNEE ARTHROPLASTY  10/2012    trus/bx      ULTRASOUND GUIDANCE  2/14/2020    Procedure: Ultrasound Guidance;  Surgeon: Miguel Angel Brady MD;  Location: [facility] CATH LAB;  Service: Cardiology;;       Review of patient's allergies indicates:   Allergen Reactions    Ciprofloxacin Rash     Diffuse pruritic morbilliform rash developed 3/15/2017 after dose of cipro; previously in 2/2017 he had rash/fevers after initiation of cipro    Zosyn [piperacillin-tazobactam] Rash     Diffuse pruritic morbilliform rash developed 3/15/2017.  Then, 430am dose on 3/16 and rash worsened with SOB/tachypnea but no hypoxemia.     Bacitracin Itching and Rash     Violaceous rash in area of topical Tx.        No current facility-administered medications on file prior to encounter.      Current Outpatient Medications on File Prior to Encounter   Medication Sig    acetaminophen-codeine 300-30mg (TYLENOL #3) 300-30 mg Tab Take 1 tablet by mouth every 8 (eight) hours as needed (pain).    acyclovir (ZOVIRAX) 800 MG Tab Take 1 tablet (800 mg total) by mouth 2 (two) times daily.    apixaban (ELIQUIS) 5 mg Tab Take 1 tablet (5 mg total) by mouth 2 (two) times daily.    artificial tears (ISOPTO TEARS) 0.5 % ophthalmic solution Place 1 drop into both eyes as needed. (Patient taking differently: Place 1 drop into both eyes as needed (for dry eyes). )    atorvastatin (LIPITOR) 40 MG tablet Take 1 tablet by mouth once daily    clopidogreL (PLAVIX) 75 mg tablet Take 1 tablet (75 mg total) by mouth once daily.    fluorouraciL (EFUDEX) 5 % cream AAA left lower medial leg bid x 4 weeks    gabapentin (NEURONTIN) 300 MG capsule Take 1 capsule (300 mg total) by mouth 2 (two) times daily. (Patient taking differently: Take 300 mg by  mouth 3 (three) times daily. )    metoprolol succinate (TOPROL-XL) 25 MG 24 hr tablet once daily.     olmesartan (BENICAR) 20 MG tablet TAKE 1 TABLET BY MOUTH ONCE DAILY. REPLACES RAMIPRIL FOR BLOOD PRESSURE    oxybutynin (DITROPAN-XL) 5 MG TR24 Take 1 tablet (5 mg total) by mouth once daily.    tamsulosin (FLOMAX) 0.4 mg Cap Take 1 capsule (0.4 mg total) by mouth once daily.    triamcinolone acetonide 0.1% (KENALOG) 0.1 % cream Apply topically 2 (two) times daily. Apply to affected area twice daily as directed.    venlafaxine (EFFEXOR) 75 MG tablet Take 2 tablets by mouth twice daily    albuterol (PROVENTIL/VENTOLIN HFA) 90 mcg/actuation inhaler Inhale 2 puffs into the lungs every 6 (six) hours as needed for Wheezing. Rescue    azelastine (ASTELIN) 137 mcg (0.1 %) nasal spray 1 spray (137 mcg total) by Nasal route 2 (two) times daily.    CYANOCOBALAMIN, VITAMIN B-12, (VITAMIN B-12 ORAL) Take 2,500 mcg by mouth once daily.    fluticasone propionate (FLONASE) 50 mcg/actuation nasal spray 1 spray (50 mcg total) by Each Nostril route once daily. (Patient taking differently: 1 spray by Each Nostril route once daily. As needed)    furosemide (LASIX) 20 MG tablet Take 1 tablet by mouth once daily (Patient not taking: Reported on 8/24/2020)    melatonin 5 mg Tab Take 10 mg by mouth nightly.    multivitamin (MULTIPLE VITAMINS) per tablet Take 1 tablet by mouth once daily.    nitroGLYCERIN (NITROSTAT) 0.4 MG SL tablet Place 1 tablet (0.4 mg total) under the tongue every 5 (five) minutes as needed for Chest pain.    omeprazole (PRILOSEC) 20 MG capsule Take 1 capsule (20 mg total) by mouth daily as needed.    vitamin D 1000 units Tab Take 1 tablet (1,000 Units total) by mouth once daily.     Family History     Problem Relation (Age of Onset)    Alzheimer's disease Mother    Cancer Father, Sister, Brother    Hypertension Mother    Lung cancer Father    No Known Problems Sister, Maternal Aunt, Maternal Uncle,  Paternal Aunt, Paternal Uncle, Maternal Grandmother, Maternal Grandfather, Paternal Grandmother, Paternal Grandfather    Peripheral vascular disease     Skin cancer Father        Tobacco Use    Smoking status: Never Smoker    Smokeless tobacco: Never Used   Substance and Sexual Activity    Alcohol use: Yes     Frequency: 2-4 times a month     Drinks per session: 3 or 4     Binge frequency: Never     Comment: occasionally, beer    Drug use: Never    Sexual activity: Yes     Partners: Female     Review of Systems   Constitutional: Positive for activity change and fatigue. Negative for chills and fever.   HENT: Negative for congestion, ear pain, hearing loss, mouth sores, nosebleeds and sore throat.    Eyes: Negative for photophobia, pain and visual disturbance.   Respiratory: Negative for apnea, cough, chest tightness, shortness of breath, wheezing and stridor.    Cardiovascular: Positive for leg swelling. Negative for chest pain and palpitations.   Gastrointestinal: Negative for abdominal distention, abdominal pain, blood in stool, constipation, diarrhea and nausea.   Endocrine: Negative for polydipsia and polyphagia.   Genitourinary: Negative for difficulty urinating and dysuria.   Musculoskeletal: Positive for back pain, gait problem and myalgias.   Neurological: Positive for dizziness, light-headedness and numbness. Negative for seizures, weakness and headaches.   Hematological: Negative.    Psychiatric/Behavioral: Negative.      Objective:     Vital Signs (Most Recent):  Temp: 98.2 °F (36.8 °C) (08/25/20 0400)  Pulse: 68 (08/25/20 0400)  Resp: 14 (08/25/20 0400)  BP: (!) 114/52 (08/25/20 0400)  SpO2: 100 % (08/25/20 0400) Vital Signs (24h Range):  Temp:  [97.7 °F (36.5 °C)-98.5 °F (36.9 °C)] 98.2 °F (36.8 °C)  Pulse:  [60-92] 68  Resp:  [13-33] 14  SpO2:  [95 %-100 %] 100 %  BP: ()/(39-60) 114/52     Weight: (!) 155.1 kg (342 lb)  Body mass index is 45.12 kg/m².    Physical Exam  Vitals signs reviewed.    Constitutional:       Appearance: Normal appearance. He is obese.   HENT:      Head: Normocephalic.      Nose: Nose normal.      Mouth/Throat:      Mouth: Mucous membranes are moist.   Eyes:      Extraocular Movements: Extraocular movements intact.      Conjunctiva/sclera: Conjunctivae normal.      Pupils: Pupils are equal, round, and reactive to light.   Neck:      Musculoskeletal: Normal range of motion and neck supple.   Cardiovascular:      Rate and Rhythm: Normal rate and regular rhythm.      Pulses: Normal pulses.      Heart sounds: Normal heart sounds.   Pulmonary:      Effort: Pulmonary effort is normal.      Breath sounds: Normal breath sounds.   Abdominal:      General: Bowel sounds are normal.   Neurological:      General: No focal deficit present.      Mental Status: He is alert and oriented to person, place, and time.   Psychiatric:         Mood and Affect: Mood normal.         Behavior: Behavior normal.           CRANIAL NERVES     CN III, IV, VI   Pupils are equal, round, and reactive to light.       Significant Labs: All pertinent labs within the past 24 hours have been reviewed.    Significant Imaging: I have reviewed and interpreted all pertinent imaging results/findings within the past 24 hours.

## 2020-08-25 NOTE — PLAN OF CARE
08/25/20 1259   Post-Acute Status   Post-Acute Authorization Other   Other Status No Post-Acute Service Needs   Discharge Delays None known at this time   Discharge Plan   Discharge Plan A Home with family   Discharge Plan B Home with family;Home Health

## 2020-08-25 NOTE — PLAN OF CARE
I have seen the patient, discussed the resident's history and physical, assessment and plan. I have personally interviewed and examined the patient at bedside.  In summary:    Mr. Janusz Montgomery Jr. is a 75 y.o. male with CKD3 and HTN (on the [facility] home BP monitoring program) who presents to the ER from Nephrology clinic for evaluation of dizziness and elevated creatinine.  In clinic, he was found to have SBP 90/60 and was sent to the ER.  Per his home BP monitoring program, his BP fluctuates 100-160s.  He is on Olmesartan and Metoprolol.  Labs showed Creatinine 2.9 from a baseline of 1.5.  Likely NAT 2/2 ATN from hypotension.  BP improved with IVF boluses.  Check renal US and protein:creatinine ratio per Nephro recs.  Hold Olmesartan, Metoprolol and Acyclovir for now.    Full H&P by team intern to follow.    Jannette Barlow MD  [facility] Medicine  Medical Director, [facility]  Cell:  [phone]  Spectra:  [phone]

## 2020-08-25 NOTE — RESPIRATORY THERAPY
Pt does not want to wear CPAP at this moment. Pt waiting for bedroom upstairs. Will continue to monitor.
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
## 2020-08-25 NOTE — PLAN OF CARE
POC reviewed with patient, questions and concerns addressed. No acute events through the night.  All Vital signs stable. No complaints.  AAOx4. Safety maintained.  Bed locked, in lowest position, call light within reach, side rails x2. Mobilized to their highest function. See doc flow sheets for further information. Will continue to monitor.

## 2020-08-25 NOTE — ED NOTES
Dr. Barlow notified on BP 87/39 - 57. VORB to start 500mL of NS and continue to monitor.  At this time, patient denies any CP/SOB. AAOx4. No acute distress noted. Will continue to monitor.

## 2020-08-25 NOTE — PLAN OF CARE
CM to bedside - pt provided assessment info. Pt w/ CPAP in place, lives w/ spouse and pt reports being the caregiver to his spouse. Pt will likely d/c home w/ no needs. Pt has an appt today w/ Dr Swift - JOSEPH contacted Ortho clinic and appt was rescheduled to next Tues 9/1.    CM provided patient anticipated KIARA which was written on the whiteboard and will be update by nursing staff.   Patient provided a Discharge Planning booklet. Patient verbalized understanding.    JOSEPH LIND [id number] - assisting 8WT JOSEPH Duarte [id number]     08/25/20 1312   Discharge Assessment   Assessment Type Discharge Planning Assessment   Confirmed/corrected address and phone number on facesheet? Yes   Assessment information obtained from? Patient   Expected Length of Stay (days) 2   Communicated expected length of stay with patient/caregiver yes   Prior to hospitilization cognitive status: Alert/Oriented   Prior to hospitalization functional status: Independent   Current cognitive status: Alert/Oriented   Current Functional Status: Independent   Facility Arrived From: N/A   Lives With spouse   Able to Return to Prior Arrangements yes   Is patient able to care for self after discharge? Yes   Who are your caregiver(s) and their phone number(s)? spouse - Bri [phone]   Patient's perception of discharge disposition home or selfcare   Readmission Within the Last 30 Days no previous admission in last 30 days   Patient currently being followed by outpatient case management? No   Patient currently receives any other outside agency services? No   Equipment Currently Used at Home CPAP   Do you have any problems affording any of your prescribed medications? No   Is the patient taking medications as prescribed? yes   Does the patient have transportation home? Yes  (pt's dgtr will likely pickup pt but if unable pt may need a ride)   Transportation Anticipated car, drives self   Dialysis Name and Scheduled days N/A   Does the patient receive services at  the [facility]? No   Discharge Plan A Home with family   Discharge Plan B Home with family;Home Health   DME Needed Upon Discharge  other (see comments)  (TBD)   Patient/Family in Agreement with Plan yes

## 2020-08-26 PROBLEM — R68.81 EARLY SATIETY: Status: RESOLVED | Noted: 2020-07-23 | Resolved: 2020-08-26

## 2020-08-26 LAB
ANION GAP SERPL CALC-SCNC: 10 MMOL/L (ref 8–16)
ANION GAP SERPL CALC-SCNC: 8 MMOL/L (ref 8–16)
BASOPHILS # BLD AUTO: 0.08 K/UL (ref 0–0.2)
BASOPHILS NFR BLD: 0.8 % (ref 0–1.9)
BUN SERPL-MCNC: 42 MG/DL (ref 8–23)
BUN SERPL-MCNC: 46 MG/DL (ref 8–23)
CALCIUM SERPL-MCNC: 8.7 MG/DL (ref 8.7–10.5)
CALCIUM SERPL-MCNC: 8.8 MG/DL (ref 8.7–10.5)
CHLORIDE SERPL-SCNC: 109 MMOL/L (ref 95–110)
CHLORIDE SERPL-SCNC: 113 MMOL/L (ref 95–110)
CO2 SERPL-SCNC: 20 MMOL/L (ref 23–29)
CO2 SERPL-SCNC: 25 MMOL/L (ref 23–29)
CREAT SERPL-MCNC: 2.4 MG/DL (ref 0.5–1.4)
CREAT SERPL-MCNC: 2.6 MG/DL (ref 0.5–1.4)
DIFFERENTIAL METHOD: ABNORMAL
EOSINOPHIL # BLD AUTO: 0.4 K/UL (ref 0–0.5)
EOSINOPHIL NFR BLD: 3.7 % (ref 0–8)
ERYTHROCYTE [DISTWIDTH] IN BLOOD BY AUTOMATED COUNT: 13.5 % (ref 11.5–14.5)
EST. GFR  (AFRICAN AMERICAN): 27.1 ML/MIN/1.73 M^2
EST. GFR  (AFRICAN AMERICAN): 29.8 ML/MIN/1.73 M^2
EST. GFR  (NON AFRICAN AMERICAN): 23.4 ML/MIN/1.73 M^2
EST. GFR  (NON AFRICAN AMERICAN): 25.8 ML/MIN/1.73 M^2
GLUCOSE SERPL-MCNC: 103 MG/DL (ref 70–110)
GLUCOSE SERPL-MCNC: 96 MG/DL (ref 70–110)
HCT VFR BLD AUTO: 35.1 % (ref 40–54)
HGB BLD-MCNC: 11.1 G/DL (ref 14–18)
IMM GRANULOCYTES # BLD AUTO: 0.02 K/UL (ref 0–0.04)
IMM GRANULOCYTES NFR BLD AUTO: 0.2 % (ref 0–0.5)
LYMPHOCYTES # BLD AUTO: 1.9 K/UL (ref 1–4.8)
LYMPHOCYTES NFR BLD: 19.7 % (ref 18–48)
MAGNESIUM SERPL-MCNC: 2 MG/DL (ref 1.6–2.6)
MCH RBC QN AUTO: 33.4 PG (ref 27–31)
MCHC RBC AUTO-ENTMCNC: 31.6 G/DL (ref 32–36)
MCV RBC AUTO: 106 FL (ref 82–98)
MONOCYTES # BLD AUTO: 0.5 K/UL (ref 0.3–1)
MONOCYTES NFR BLD: 5.6 % (ref 4–15)
NEUTROPHILS # BLD AUTO: 6.6 K/UL (ref 1.8–7.7)
NEUTROPHILS NFR BLD: 70 % (ref 38–73)
NRBC BLD-RTO: 0 /100 WBC
PHOSPHATE SERPL-MCNC: 4.5 MG/DL (ref 2.7–4.5)
PLATELET # BLD AUTO: 133 K/UL (ref 150–350)
PMV BLD AUTO: 11.3 FL (ref 9.2–12.9)
POTASSIUM SERPL-SCNC: 4.9 MMOL/L (ref 3.5–5.1)
POTASSIUM SERPL-SCNC: 5.3 MMOL/L (ref 3.5–5.1)
RBC # BLD AUTO: 3.32 M/UL (ref 4.6–6.2)
SODIUM SERPL-SCNC: 142 MMOL/L (ref 136–145)
SODIUM SERPL-SCNC: 143 MMOL/L (ref 136–145)
WBC # BLD AUTO: 9.45 K/UL (ref 3.9–12.7)

## 2020-08-26 PROCEDURE — 80048 BASIC METABOLIC PNL TOTAL CA: CPT | Mod: 91,HCNC

## 2020-08-26 PROCEDURE — 25000003 PHARM REV CODE 250: Mod: HCNC | Performed by: STUDENT IN AN ORGANIZED HEALTH CARE EDUCATION/TRAINING PROGRAM

## 2020-08-26 PROCEDURE — 99900035 HC TECH TIME PER 15 MIN (STAT): Mod: HCNC

## 2020-08-26 PROCEDURE — 94660 CPAP INITIATION&MGMT: CPT | Mod: HCNC

## 2020-08-26 PROCEDURE — 99232 PR SUBSEQUENT HOSPITAL CARE,LEVL II: ICD-10-PCS | Mod: HCNC,GC,, | Performed by: INTERNAL MEDICINE

## 2020-08-26 PROCEDURE — 99232 SBSQ HOSP IP/OBS MODERATE 35: CPT | Mod: HCNC,GC,, | Performed by: INTERNAL MEDICINE

## 2020-08-26 PROCEDURE — 80048 BASIC METABOLIC PNL TOTAL CA: CPT | Mod: HCNC

## 2020-08-26 PROCEDURE — 36415 COLL VENOUS BLD VENIPUNCTURE: CPT | Mod: HCNC

## 2020-08-26 PROCEDURE — 20600001 HC STEP DOWN PRIVATE ROOM: Mod: HCNC

## 2020-08-26 PROCEDURE — 83735 ASSAY OF MAGNESIUM: CPT | Mod: HCNC

## 2020-08-26 PROCEDURE — 85025 COMPLETE CBC W/AUTO DIFF WBC: CPT | Mod: HCNC

## 2020-08-26 PROCEDURE — 94761 N-INVAS EAR/PLS OXIMETRY MLT: CPT | Mod: HCNC

## 2020-08-26 PROCEDURE — 84100 ASSAY OF PHOSPHORUS: CPT | Mod: HCNC

## 2020-08-26 RX ORDER — METOPROLOL SUCCINATE 25 MG/1
25 TABLET, EXTENDED RELEASE ORAL DAILY
Status: DISCONTINUED | OUTPATIENT
Start: 2020-08-26 | End: 2020-08-27 | Stop reason: HOSPADM

## 2020-08-26 RX ADMIN — FLUTICASONE PROPIONATE 50 MCG: 50 SPRAY, METERED NASAL at 08:08

## 2020-08-26 RX ADMIN — AZELASTINE HYDROCHLORIDE 137 MCG: 137 SPRAY, METERED NASAL at 09:08

## 2020-08-26 RX ADMIN — GABAPENTIN 300 MG: 300 CAPSULE ORAL at 09:08

## 2020-08-26 RX ADMIN — GABAPENTIN 300 MG: 300 CAPSULE ORAL at 02:08

## 2020-08-26 RX ADMIN — VENLAFAXINE 75 MG: 75 TABLET ORAL at 09:08

## 2020-08-26 RX ADMIN — MELATONIN TAB 3 MG 6 MG: 3 TAB at 11:08

## 2020-08-26 RX ADMIN — APIXABAN 5 MG: 5 TABLET, FILM COATED ORAL at 08:08

## 2020-08-26 RX ADMIN — GABAPENTIN 300 MG: 300 CAPSULE ORAL at 08:08

## 2020-08-26 RX ADMIN — OXYBUTYNIN CHLORIDE 5 MG: 5 TABLET, EXTENDED RELEASE ORAL at 08:08

## 2020-08-26 RX ADMIN — CLOPIDOGREL 75 MG: 75 TABLET, FILM COATED ORAL at 08:08

## 2020-08-26 RX ADMIN — VENLAFAXINE 75 MG: 75 TABLET ORAL at 08:08

## 2020-08-26 RX ADMIN — APIXABAN 5 MG: 5 TABLET, FILM COATED ORAL at 09:08

## 2020-08-26 RX ADMIN — ACYCLOVIR 800 MG: 800 TABLET ORAL at 09:08

## 2020-08-26 RX ADMIN — ATORVASTATIN CALCIUM 40 MG: 20 TABLET, FILM COATED ORAL at 08:08

## 2020-08-26 RX ADMIN — ACYCLOVIR 800 MG: 800 TABLET ORAL at 08:08

## 2020-08-26 RX ADMIN — TAMSULOSIN HYDROCHLORIDE 0.4 MG: 0.4 CAPSULE ORAL at 08:08

## 2020-08-26 RX ADMIN — METOPROLOL SUCCINATE 25 MG: 25 TABLET, EXTENDED RELEASE ORAL at 12:08

## 2020-08-26 RX ADMIN — AZELASTINE HYDROCHLORIDE 137 MCG: 137 SPRAY, METERED NASAL at 08:08

## 2020-08-26 RX ADMIN — SODIUM POLYSTYRENE SULFONATE 45 G: 15 SUSPENSION ORAL; RECTAL at 12:08

## 2020-08-26 NOTE — CONSULTS
Wound care consult received.  Pt with CKD 3 and CAD who presented to [facility] ED from nephrology clinic for hypotension and dizziness and pt was admitted with acute kidney injury superimposed on chronic kidney disease.  Pt known to outpt wound care clinic and has been managed for wound from treated BCC to L leg with MediHoney and compression.  See assessment below.  Discussed recommendations with Dr. Manley.    Recommendations:  - Santyl ointment to provide enzymatic debridement of slough and promote healing  - Would need SAMANTHA to continue with compression  Wound care team to follow pt prn  [id number]      L leg- full thickness ulceration 3.5x3.5x0.1cm mostly slough. Tan colored drainage with no odor.
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
## 2020-08-26 NOTE — SUBJECTIVE & OBJECTIVE
"Past Medical History:   Diagnosis Date    NAT (acute kidney injury) 3/19/2017    ALLERGIC RHINITIS     Anemia     Anxiety     Basal cell carcinoma 10/19/2018    forehead and right medial shoulder    Basal cell carcinoma 01/09/2019    left nasal bridge and left posterior ear    Chronic rhinitis 5/3/2013    Chronic rhinitis 5/3/2013    Coronary artery disease involving native coronary artery of native heart without angina pectoris s/p RCA stent     Cortical cataract of both eyes 3/18/2016    Delayed sleep phase syndrome 3/13/2019    Depression     Erectile dysfunction 3/24/2014    Erectile dysfunction 3/24/2014    Essential hypertension     GERD (gastroesophageal reflux disease) 7/25/2012    Gout, arthritis     Grade III open fracture of left tibia and fibula s/p ex-fix on 7/1/16 and ORIF of left tibia on 7/15 7/6/2016    H/O: iritis     Helicobacter pylori (H. pylori) infection     Treated    Herpes simplex keratoconjunctivitis 9/30/2015    - on acyclovir - followed by opthalmology, Dr. Uribe     Herpes simplex keratoconjunctivitis 9/30/2015    - on acyclovir - followed by opthalmology, Dr. Uribe     Hyperkalemia 2/28/2017    Hyperlipidemia     Hypogonadism male     Hypogonadism male     Mixed anxiety and depressive disorder     Morbid obesity     Obstructive sleep apnea on CPAP     CPAP    Osteoarthritis of left knee 7/25/2012    Paroxysmal atrial fibrillation 7/6/2016    Primary osteoarthritis of left knee 7/25/2012    Prominent aorta 1/25/2016    "RESULTS: THE HEART IS MILDLY ENLARGED WITH A SLIGHTLY PROMINENT AORTA" - Xray Chest PA & Lateral 12-     Prostate cancer 2/15/2016    - followed by urology, Dr. Young     Prostate cancer 2/15/2016    - followed by urology, Dr. 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 Conteh    CATARACT EXTRACTION W/  INTRAOCULAR LENS IMPLANT Left 4/12/2016        COLONOSCOPY N/A 7/23/2020    Procedure: COLONOSCOPY;  Surgeon: Nico Lackey MD;  Location: [facility] ([address] [place] [address]);  Service: Endoscopy;  Laterality: N/A;    ESOPHAGOGASTRODUODENOSCOPY N/A 7/23/2020    Procedure: EGD (ESOPHAGOGASTRODUODENOSCOPY);  Surgeon: Nico Lackey MD;  Location: [facility] ([address] [place] [address]);  Service: Endoscopy;  Laterality: N/A;  per Dr Lackey-Will proceed with EGD and colonoscopy on the 2nd floor due to his comorbidities including obesity, sleep apnea, restrictive lung disease, coronary artery disease.  has loop recorder      ok to hold Eliquis 2 days per Dr Sandhu-must remain    EXTERNAL FIXATION TIBIAL FRACTURE Left 07/01/2016    INSERTION OF IMPLANTABLE LOOP RECORDER  04/07/2017    LEFT HEART CATHETERIZATION Left 1/30/2020    Procedure: Left heart cath;  Surgeon: Benjamin Sandhu MD;  Location: [facility] CATH LAB;  Service: Cardiology;  Laterality: Left;  RN PREOP 1/28/20--Pt starting Plavix loading dose today (8pills)- Dr. Sandhu aware.  Pt has a bandaged "non healing area to LLE"--Dr. Sandhu aware.    LEFT HEART CATHETERIZATION Left 2/14/2020    Procedure: Left heart cath, IVUS guided left main / LAD PCI. Noon start, radial approach;  Surgeon: Miguel Angel Brady MD;  Location: [facility] CATH LAB;  Service: Cardiology;  Laterality: Left;  RN PRE OP 2-7-2020  BMI--45.61    ORIF TIBIA FRACTURE Left 07/15/2016    RADIOACTIVE SEED IMPLANTATION OF PROSTATE N/A 8/8/2018    Procedure: " INSERTION, RADIOACTIVE SEED, PROSTATE;  Surgeon: Bipin Thompson MD;  Location: [facility] OR [address] [place] [address];  Service: Urology;  Laterality: N/A;  1 hour    Squamous cell cancer removal x3 with Mohs surgery      TONSILLECTOMY      TOTAL KNEE ARTHROPLASTY  10/2012    trus/bx      ULTRASOUND GUIDANCE  2/14/2020    Procedure: Ultrasound Guidance;  Surgeon: Miguel Angel Brady MD;  Location: [facility] CATH LAB;  Service: Cardiology;;       Review of patient's allergies indicates:   Allergen Reactions    Ciprofloxacin Rash     Diffuse pruritic morbilliform rash developed 3/15/2017 after dose of cipro; previously in 2/2017 he had rash/fevers after initiation of cipro    Zosyn [piperacillin-tazobactam] Rash     Diffuse pruritic morbilliform rash developed 3/15/2017.  Then, 430am dose on 3/16 and rash worsened with SOB/tachypnea but no hypoxemia.     Bacitracin Itching and Rash     Violaceous rash in area of topical Tx.      Current Facility-Administered Medications   Medication Frequency    acetaminophen tablet 650 mg Q8H PRN    acetaminophen-codeine 300-30mg per tablet 1 tablet Q8H PRN    acyclovir tablet 800 mg BID    albuterol inhaler 2 puff Q6H PRN    apixaban tablet 5 mg BID    artificial tears 0.5 % ophthalmic solution 1 drop PRN    atorvastatin tablet 40 mg Daily    azelastine 137 mcg (0.1 %) nasal spray 137 mcg BID    clopidogreL tablet 75 mg Daily    dextrose 50% injection 12.5 g PRN    dextrose 50% injection 25 g PRN    fluticasone propionate 50 mcg/actuation nasal spray 50 mcg Daily    gabapentin capsule 300 mg TID    glucagon (human recombinant) injection 1 mg PRN    glucose chewable tablet 16 g PRN    glucose chewable tablet 24 g PRN    hydrALAZINE tablet 25 mg TID PRN    melatonin tablet 6 mg Nightly PRN    oxybutynin 24 hr tablet 5 mg Daily    sodium chloride 0.9% flush 10 mL PRN    sodium chloride 0.9% flush 10 mL PRN    tamsulosin 24 hr capsule 0.4 mg Daily    venlafaxine tablet 75 mg BID      Family History     Problem Relation (Age of Onset)    Alzheimer's disease Mother    Cancer Father, Sister, Brother    Hypertension Mother    Lung cancer Father    No Known Problems Sister, Maternal Aunt, Maternal Uncle, Paternal Aunt, Paternal Uncle, Maternal Grandmother, Maternal Grandfather, Paternal Grandmother, Paternal Grandfather    Peripheral vascular disease     Skin cancer Father        Tobacco Use    Smoking status: Never Smoker    Smokeless tobacco: Never Used   Substance and Sexual Activity    Alcohol use: Yes     Frequency: 2-4 times a month     Drinks per session: 3 or 4     Binge frequency: Never     Comment: occasionally, beer    Drug use: Never    Sexual activity: Yes     Partners: Female     Review of Systems   Constitutional: Negative for chills, diaphoresis and fever.   HENT: Negative for ear discharge, ear pain, sore throat and trouble swallowing.    Eyes: Negative for pain and discharge.   Respiratory: Negative for cough, shortness of breath, wheezing and stridor.    Cardiovascular: Negative for chest pain, palpitations and leg swelling.   Gastrointestinal: Positive for abdominal distention. Negative for abdominal pain, blood in stool, constipation, diarrhea, nausea and vomiting.   Endocrine: Positive for heat intolerance, polydipsia and polyuria.   Genitourinary: Negative for dysuria and hematuria.   Musculoskeletal: Negative for joint swelling and neck stiffness.   Skin: Negative for rash and wound.   Neurological: Negative for seizures, syncope and facial asymmetry.   Psychiatric/Behavioral: Negative for agitation, confusion and suicidal ideas.     Objective:     Vital Signs (Most Recent):  Temp: 98 °F (36.7 °C) (08/25/20 1640)  Pulse: 77 (08/25/20 1640)  Resp: 20 (08/25/20 1640)  BP: (!) 158/67 (08/25/20 1640)  SpO2: (!) 91 % (08/25/20 1640)  O2 Device (Oxygen Therapy): room air (08/25/20 1640) Vital Signs (24h Range):  Temp:  [97.6 °F (36.4 °C)-99.1 °F (37.3 °C)] 98 °F (36.7  °C)  Pulse:  [63-92] 77  Resp:  [13-33] 20  SpO2:  [91 %-100 %] 91 %  BP: ()/(39-72) 158/67     Weight: (!) 155.1 kg (342 lb) (08/24/20 1440)  Body mass index is 45.12 kg/m².  Body surface area is 2.83 meters squared.    I/O last 3 completed shifts:  In: 1050 [I.V.:1000; IV Piggyback:50]  Out: 1550 [Urine:1550]    Physical Exam  Vitals signs and nursing note reviewed.   Constitutional:       General: He is not in acute distress.     Appearance: He is well-developed. He is obese. He is not diaphoretic.   HENT:      Head: Normocephalic and atraumatic.      Right Ear: External ear normal.      Left Ear: External ear normal.      Mouth/Throat:      Pharynx: No oropharyngeal exudate.   Eyes:      General: No scleral icterus.        Right eye: No discharge.         Left eye: No discharge.      Conjunctiva/sclera: Conjunctivae normal.      Pupils: Pupils are equal, round, and reactive to light.   Neck:      Musculoskeletal: Normal range of motion.      Thyroid: No thyromegaly.      Trachea: No tracheal deviation.   Cardiovascular:      Rate and Rhythm: Normal rate and regular rhythm.      Heart sounds: No murmur. No friction rub. No gallop.    Pulmonary:      Effort: Pulmonary effort is normal. No respiratory distress.      Breath sounds: Normal breath sounds. No stridor. No wheezing or rales.   Abdominal:      General: Bowel sounds are normal. There is distension.      Palpations: Abdomen is soft.      Tenderness: There is no abdominal tenderness. There is no guarding.   Musculoskeletal:         General: No tenderness or deformity.      Right lower leg: No edema.      Left lower leg: Edema present.   Lymphadenopathy:      Cervical: No cervical adenopathy.   Skin:     Coloration: Skin is not pale.      Findings: No erythema or rash.   Neurological:      Mental Status: He is alert and oriented to person, place, and time.      Cranial Nerves: No cranial nerve deficit.   Psychiatric:         Thought Content: Thought  content normal.         Significant Labs:  All labs within the past 24 hours have been reviewed.    Significant Imaging:  Labs: Reviewed  X-Ray: Reviewed  US: Reviewed

## 2020-08-26 NOTE — SUBJECTIVE & OBJECTIVE
Interval History: Patient seen and examined this AM. No acute events overnight. Systolic blood pressure 120-140s and UOP 2.55 L in the last 24hrs. BUN and creatinine improving but with some mild hyperkalemia for which will give Kayexalate. Nephrology following. Tentative plan to discharge to home on 8/27.    Review of Systems   Constitutional: Negative for chills, diaphoresis, fatigue and fever.   HENT: Negative for rhinorrhea and sore throat.    Eyes: Negative for visual disturbance.   Respiratory: Negative for cough, shortness of breath and wheezing.    Cardiovascular: Negative for chest pain, palpitations and leg swelling.   Gastrointestinal: Negative for abdominal distention, abdominal pain, blood in stool, constipation, diarrhea, nausea and vomiting.   Endocrine: Positive for polydipsia and polyuria.   Genitourinary: Negative for difficulty urinating, dysuria and hematuria.   Musculoskeletal: Negative for arthralgias and back pain.   Skin: Positive for wound.   Neurological: Negative for dizziness, light-headedness and headaches.   Psychiatric/Behavioral: Negative for confusion and sleep disturbance.     Objective:     Vital Signs (Most Recent):  Temp: 97.9 °F (36.6 °C) (08/26/20 1641)  Pulse: 82 (08/26/20 1641)  Resp: 16 (08/26/20 1641)  BP: (!) 149/63 (08/26/20 1641)  SpO2: 99 % (08/26/20 1641) Vital Signs (24h Range):  Temp:  [97.8 °F (36.6 °C)-98.6 °F (37 °C)] 97.9 °F (36.6 °C)  Pulse:  [] 82  Resp:  [10-20] 16  SpO2:  [79 %-100 %] 99 %  BP: (120-159)/(48-67) 149/63     Weight: (!) 155.1 kg (342 lb)  Body mass index is 45.12 kg/m².    Intake/Output Summary (Last 24 hours) at 8/26/2020 1814  Last data filed at 8/26/2020 1600  Gross per 24 hour   Intake 1280 ml   Output 3320 ml   Net -2040 ml      Physical Exam  Vitals signs and nursing note reviewed.   Constitutional:       General: He is awake. He is not in acute distress.     Appearance: He is well-developed. He is morbidly obese. He is not  diaphoretic.   HENT:      Head: Normocephalic and atraumatic.      Mouth/Throat:      Mouth: Mucous membranes are moist.      Pharynx: Oropharynx is clear. No oropharyngeal exudate.   Eyes:      General: No scleral icterus.     Extraocular Movements: Extraocular movements intact.      Conjunctiva/sclera: Conjunctivae normal.   Neck:      Musculoskeletal: Normal range of motion and neck supple.      Thyroid: No thyromegaly.      Trachea: No tracheal deviation.   Cardiovascular:      Rate and Rhythm: Normal rate.      Heart sounds: No murmur. No friction rub. No gallop.    Pulmonary:      Effort: Pulmonary effort is normal. No respiratory distress.      Breath sounds: No wheezing, rhonchi or rales.   Abdominal:      General: Bowel sounds are normal. There is no distension.      Palpations: Abdomen is soft.      Tenderness: There is no abdominal tenderness.      Comments: Obese/protuberant abdomen.   Musculoskeletal:         General: Deformity (left lower extremity wound dressed) present.      Right lower leg: No edema.      Left lower leg: Edema present.   Skin:     General: Skin is warm and dry.   Neurological:      General: No focal deficit present.      Mental Status: He is alert. Mental status is at baseline.      Motor: No weakness.      Coordination: Coordination normal.      Gait: Gait normal.   Psychiatric:         Mood and Affect: Mood normal.         Behavior: Behavior normal. Behavior is cooperative.       Significant Labs:   Recent Lab Results       08/26/20  1226   08/26/20  0335   08/25/20  1944        Anion Gap 8 10       Baso #   0.08       Basophil%   0.8       BUN, Bld 42 46       Calcium 8.8 8.7       Chloride 109 113       CO2 25 20       CPK     66     Creatinine 2.4 2.6       Differential Method   Automated       eGFR if  29.8 27.1       eGFR if non  25.8  Comment:  Calculation used to obtain the estimated glomerular filtration  rate (eGFR) is the CKD-EPI equation.     23.4  Comment:  Calculation used to obtain the estimated glomerular filtration  rate (eGFR) is the CKD-EPI equation.          Eos #   0.4       Eosinophil%   3.7       Glucose 96 103       Gran # (ANC)   6.6       Gran%   70.0       Hematocrit   35.1       Hemoglobin   11.1       Immature Grans (Abs)   0.02  Comment:  Mild elevation in immature granulocytes is non specific and   can be seen in a variety of conditions including stress response,   acute inflammation, trauma and pregnancy. Correlation with other   laboratory and clinical findings is essential.         Immature Granulocytes   0.2       Lymph #   1.9       Lymph%   19.7       Magnesium   2.0       MCH   33.4       MCHC   31.6       MCV   106       Mono #   0.5       Mono%   5.6       MPV   11.3       nRBC   0       Phosphorus   4.5       Platelets   133       Potassium 4.9 5.3  Comment:  *No Visible Hemolysis       RBC   3.32       RDW   13.5       Sodium 142 143       WBC   9.45             Significant Imaging: I have reviewed all pertinent imaging results/findings within the past 24 hours.

## 2020-08-26 NOTE — MEDICAL/APP STUDENT
Progress Note  Hospital Medicine                                                              Team: [facility] HOSP MED 2    Patient Name: Janusz Montgomery Jr.  YOB: 1945    Admit Date: 8/24/2020                     LOS: 2    SUBJECTIVE:     Reason for Admission:  Acute kidney injury superimposed on chronic kidney disease  75 year old male with history of ckd 3 and cad who presented to [facility] ed from nephrology clinic for hypotension and dizziness. Patient says he felt his usual self except for slightly more dizzy than usual. He was coming to our clinic to see a nephrologist for the first time per him for kidney disease that was new to him. He says he has frequent changes in bp at home, with some dizziness, never syncope, when he gets up quickly. Never had felt as dizzy as he had on day of admission.   No changes in bp meds recently, and only med change was addition of hydrocodone for pain.   On admission creat elevated to 2.9 from 1.5 and sbp 90's. Given 3 liters ns and sbp improved to 170. Minimal change in creat over night. Nephrology consulted for ra.    Interval history: Patient had no acute events over night. He has improving BUN/Cr from admission and is producing adequate urine. Patient does continue to report shortness of breath.       OBJECTIVE:     Vital Signs Range (Last 24H):  Temp:  [97.8 °F (36.6 °C)-98.6 °F (37 °C)]   Pulse:  []   Resp:  [10-20]   BP: (120-159)/(48-67)   SpO2:  [79 %-100 %] Body mass index is 45.12 kg/m².  Wt Readings from Last 1 Encounters:   08/24/20 1440 (!) 155.1 kg (342 lb)       I & O (Last 24H):    Intake/Output Summary (Last 24 hours) at 8/26/2020 1607  Last data filed at 8/26/2020 1400  Gross per 24 hour   Intake 980 ml   Output 3100 ml   Net -2120 ml       Physical Exam:  General: morbidly obese  HENT: Head:normocephalic, atraumatic. Ears:not examined. Nose: Nares normal. Septum midline. Mucosa normal. No drainage or sinus tenderness., no discharge. Throat: not  examined.  Eyes: conjunctivae/corneas clear. PERRL.   Lungs:  clear to auscultation bilaterally and normal respiratory effort  Cardiovascular: Heart: regular rate and rhythm, S1, S2 normal, no murmur, click, rub or gallop. Chest Wall: not examined. Extremities: bilateral LE pitting edema. Pulses: 2+ and symmetric.  Abdomen/Rectal: Abdomen: soft, non-tender non-distented; bowel sounds normal; no masses,  no organomegaly. Rectal: not examined  Skin: wound on left leg  Neurologic: Mental status: Alert, oriented, thought content appropriate    Diagnostic Results:  Lab Results   Component Value Date    WBC 9.45 08/26/2020    HGB 11.1 (L) 08/26/2020    HCT 35.1 (L) 08/26/2020     (H) 08/26/2020     (L) 08/26/2020     Recent Labs   Lab 08/26/20  0335 08/26/20  1226    96    142   K 5.3* 4.9   * 109   CO2 20* 25   BUN 46* 42*   CREATININE 2.6* 2.4*   CALCIUM 8.7 8.8   MG 2.0  --      Lab Results   Component Value Date    INR 1.0 01/28/2020    INR 1.0 02/24/2017    INR 1.0 07/06/2016     Lab Results   Component Value Date    HGBA1C 5.6 02/11/2020     No results for input(s): POCTGLUCOSE in the last 72 hours.  CPK: 66  P: 4.5  US Doppler of Kidneys: Results Reviewed with team  ASSESSMENT/PLAN:     Current Problems List:  Active Hospital Problems    Diagnosis  POA    *Acute kidney injury superimposed on chronic kidney disease [N17.9, N18.9]  Yes    CKD (chronic kidney disease) stage 3, GFR 30-59 ml/min [N18.3]  Yes    Early satiety [R68.81]  Yes    Insomnia [G47.00]  Yes     maintenance is the issue.  H/o prostate cancer with urinary urgency and incontinence.        Left leg weakness [R29.898]  Yes    Paroxysmal atrial fibrillation  [I48.0]  No     - on ASA 325mg daily  - followed by cardiology, Dr. Guardado      Essential hypertension [I10]  No      Resolved Hospital Problems   No resolved problems to display.       Active Problems, Status & Treatment Plan Addressed Today:  NAT Superimposed  on CKD III  NAT most likley secondary to ischemic ATN from hypotension  Assess US of renal arteries  Patient blood pressure remains labile, but no severe hypotensive episodes in the last 24 hours    Hyperkalemia  Continue to monitor for concerning level of elevation  NS and furosemide PRN  Signing Physician:  David Whitehead

## 2020-08-26 NOTE — HOSPITAL COURSE
Patient admitted to hospital medicine on 8/25 with NAT. Nephrology consulted and following. Urine output remains adequate at this time and BUN & creatinine improving. He was evaluated for lower extremity wound this admission with plan to follow up in wound care clinic on Friday. Patient is ready for discharge to home with follow up in the nephrology clinic within 1 week.

## 2020-08-26 NOTE — PLAN OF CARE
Problem: Adult Inpatient Plan of Care  Goal: Plan of Care Review  Outcome: Ongoing, Progressing  Plan of care was reviewed with pt. Sonja. Vitals were stable. Cardiac monitoring was done. PT/OT was able to work with the pt. US was done. Wound care came by to assess the pt. No major changes through out the day. Safety precautions were in placed.

## 2020-08-26 NOTE — ASSESSMENT & PLAN NOTE
-non oliguric kdigo stage 2 most likely secondary to ischemic ATN from hypotension  -with hyperkalemia now resolved  -UA, UPC bland   -urine microscopy bland  -no obvious medication causes, no obvious signs of infection  -he does have polydipsia and polyuria and very labile blood pressures at home  -hold olmesartan and metoprolol for now  -ordered renal doppler US, serum ck, monitor on tele  -can give normal saline and furosemide if needed for future hyperkalemia

## 2020-08-26 NOTE — ASSESSMENT & PLAN NOTE
possible NAT secondary to ATN from hypotension.     Plan:  - FENa 2.6% and FEUrea 541.3% suggestive of intrinsic process  - UPCR only 0.12  - UA bland  - Nephrology following

## 2020-08-26 NOTE — ASSESSMENT & PLAN NOTE
-very labile pressures at home  -no history to suggest pheo, migraines, carcinoid, drugs  -positive history of cad  -ordered renal doppler, consider tsh  -hold bp meds for now

## 2020-08-26 NOTE — CONSULTS
[facility]  Nephrology  Consult Note    Patient Name: Janusz Montgomery Jr.  MRN: [id number]  Admission Date: 8/24/2020  Hospital Length of Stay: 1 days  Attending Provider: Liza Manley MD   Primary Care Physician: Miguelina Weathers MD  Principal Problem:Acute kidney injury superimposed on chronic kidney disease    Inpatient consult to Nephrology  Consult performed by: Cedric Prieto MD  Consult ordered by: Pato Nelson MD  Reason for consult: nat  Assessment/Recommendations: Hypotension  Renal doppler, hold bp meds, tsh, monitor on tele        Subjective:     HPI: 75 year old male with history of ckd 3 and cad who presented to [facility] ed from nephrology clinic for hypotension and dizziness. Patient says he felt his usual self except for slightly more dizzy than usual. He was coming to our clinic to see a nephrologist for the first time per him for kidney disease that was new to him. He says he has frequent changes in bp at home, with some dizziness, never syncope, when he gets up quickly. Never had felt as dizzy as he had on day of admission.   No changes in bp meds recently, and only med change was addition of hydrocodone for pain.   On admission creat elevated to 2.9 from 1.5 and sbp 90's. Given 3 liters ns and sbp improved to 170. Minimal change in creat over night. Nephrology consulted for nat.    Past Medical History:   Diagnosis Date    NAT (acute kidney injury) 3/19/2017    ALLERGIC RHINITIS     Anemia     Anxiety     Basal cell carcinoma 10/19/2018    forehead and right medial shoulder    Basal cell carcinoma 01/09/2019    left nasal bridge and left posterior ear    Chronic rhinitis 5/3/2013    Chronic rhinitis 5/3/2013    Coronary artery disease involving native coronary artery of native heart without angina pectoris s/p RCA stent     Cortical cataract of both eyes 3/18/2016    Delayed sleep phase syndrome 3/13/2019    Depression     Erectile dysfunction 3/24/2014     "Erectile dysfunction 3/24/2014    Essential hypertension     GERD (gastroesophageal reflux disease) 7/25/2012    Gout, arthritis     Grade III open fracture of left tibia and fibula s/p ex-fix on 7/1/16 and ORIF of left tibia on 7/15 7/6/2016    H/O: iritis     Helicobacter pylori (H. pylori) infection     Treated    Herpes simplex keratoconjunctivitis 9/30/2015    - on acyclovir - followed by opthalmology, Dr. Uribe     Herpes simplex keratoconjunctivitis 9/30/2015    - on acyclovir - followed by opthalmology, Dr. Uribe     Hyperkalemia 2/28/2017    Hyperlipidemia     Hypogonadism male     Hypogonadism male     Mixed anxiety and depressive disorder     Morbid obesity     Obstructive sleep apnea on CPAP     CPAP    Osteoarthritis of left knee 7/25/2012    Paroxysmal atrial fibrillation 7/6/2016    Primary osteoarthritis of left knee 7/25/2012    Prominent aorta 1/25/2016    "RESULTS: THE HEART IS MILDLY ENLARGED WITH A SLIGHTLY PROMINENT AORTA" - Xray Chest PA & Lateral 12-     Prostate cancer 2/15/2016    - followed by urology, Dr. Young     Prostate cancer 2/15/2016    - followed by urology, Dr. 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 Conteh    CATARACT EXTRACTION W/  INTRAOCULAR LENS IMPLANT Left 4/12/2016        COLONOSCOPY N/A 7/23/2020    Procedure: COLONOSCOPY;  Surgeon: Nico Lackey, " "MD;  Location: [facility] (2ND FLR);  Service: Endoscopy;  Laterality: N/A;    ESOPHAGOGASTRODUODENOSCOPY N/A 7/23/2020    Procedure: EGD (ESOPHAGOGASTRODUODENOSCOPY);  Surgeon: Nico Lackey MD;  Location: [facility] (2ND FLR);  Service: Endoscopy;  Laterality: N/A;  per Dr Lackey-Will proceed with EGD and colonoscopy on the 2nd floor due to his comorbidities including obesity, sleep apnea, restrictive lung disease, coronary artery disease.  has loop recorder      ok to hold Eliquis 2 days per Dr Sandhu-must remain    EXTERNAL FIXATION TIBIAL FRACTURE Left 07/01/2016    INSERTION OF IMPLANTABLE LOOP RECORDER  04/07/2017    LEFT HEART CATHETERIZATION Left 1/30/2020    Procedure: Left heart cath;  Surgeon: Benjamin Sandhu MD;  Location: [facility] CATH LAB;  Service: Cardiology;  Laterality: Left;  RN PREOP 1/28/20--Pt starting Plavix loading dose today (8pills)- Dr. Sandhu aware.  Pt has a bandaged "non healing area to LLE"--Dr. Sandhu aware.    LEFT HEART CATHETERIZATION Left 2/14/2020    Procedure: Left heart cath, IVUS guided left main / LAD PCI. Noon start, radial approach;  Surgeon: Miguel Angel Brady MD;  Location: [facility] CATH LAB;  Service: Cardiology;  Laterality: Left;  RN PRE OP 2-7-2020  BMI--45.61    ORIF TIBIA FRACTURE Left 07/15/2016    RADIOACTIVE SEED IMPLANTATION OF PROSTATE N/A 8/8/2018    Procedure: INSERTION, RADIOACTIVE SEED, PROSTATE;  Surgeon: Bipin Thompson MD;  Location: [facility] OR 1ST FLR;  Service: Urology;  Laterality: N/A;  1 hour    Squamous cell cancer removal x3 with Mohs surgery      TONSILLECTOMY      TOTAL KNEE ARTHROPLASTY  10/2012    trus/bx      ULTRASOUND GUIDANCE  2/14/2020    Procedure: Ultrasound Guidance;  Surgeon: Miguel Angel Brady MD;  Location: [facility] CATH LAB;  Service: Cardiology;;       Review of patient's allergies indicates:   Allergen Reactions    Ciprofloxacin Rash     Diffuse pruritic morbilliform rash developed 3/15/2017 after dose of cipro; previously in 2/2017 " he had rash/fevers after initiation of cipro    Zosyn [piperacillin-tazobactam] Rash     Diffuse pruritic morbilliform rash developed 3/15/2017.  Then, 430am dose on 3/16 and rash worsened with SOB/tachypnea but no hypoxemia.     Bacitracin Itching and Rash     Violaceous rash in area of topical Tx.      Current Facility-Administered Medications   Medication Frequency    acetaminophen tablet 650 mg Q8H PRN    acetaminophen-codeine 300-30mg per tablet 1 tablet Q8H PRN    acyclovir tablet 800 mg BID    albuterol inhaler 2 puff Q6H PRN    apixaban tablet 5 mg BID    artificial tears 0.5 % ophthalmic solution 1 drop PRN    atorvastatin tablet 40 mg Daily    azelastine 137 mcg (0.1 %) nasal spray 137 mcg BID    clopidogreL tablet 75 mg Daily    dextrose 50% injection 12.5 g PRN    dextrose 50% injection 25 g PRN    fluticasone propionate 50 mcg/actuation nasal spray 50 mcg Daily    gabapentin capsule 300 mg TID    glucagon (human recombinant) injection 1 mg PRN    glucose chewable tablet 16 g PRN    glucose chewable tablet 24 g PRN    hydrALAZINE tablet 25 mg TID PRN    melatonin tablet 6 mg Nightly PRN    oxybutynin 24 hr tablet 5 mg Daily    sodium chloride 0.9% flush 10 mL PRN    sodium chloride 0.9% flush 10 mL PRN    tamsulosin 24 hr capsule 0.4 mg Daily    venlafaxine tablet 75 mg BID     Family History     Problem Relation (Age of Onset)    Alzheimer's disease Mother    Cancer Father, Sister, Brother    Hypertension Mother    Lung cancer Father    No Known Problems Sister, Maternal Aunt, Maternal Uncle, Paternal Aunt, Paternal Uncle, Maternal Grandmother, Maternal Grandfather, Paternal Grandmother, Paternal Grandfather    Peripheral vascular disease     Skin cancer Father        Tobacco Use    Smoking status: Never Smoker    Smokeless tobacco: Never Used   Substance and Sexual Activity    Alcohol use: Yes     Frequency: 2-4 times a month     Drinks per session: 3 or 4     Binge  frequency: Never     Comment: occasionally, beer    Drug use: Never    Sexual activity: Yes     Partners: Female     Review of Systems   Constitutional: Negative for chills, diaphoresis and fever.   HENT: Negative for ear discharge, ear pain, sore throat and trouble swallowing.    Eyes: Negative for pain and discharge.   Respiratory: Negative for cough, shortness of breath, wheezing and stridor.    Cardiovascular: Negative for chest pain, palpitations and leg swelling.   Gastrointestinal: Positive for abdominal distention. Negative for abdominal pain, blood in stool, constipation, diarrhea, nausea and vomiting.   Endocrine: Positive for heat intolerance, polydipsia and polyuria.   Genitourinary: Negative for dysuria and hematuria.   Musculoskeletal: Negative for joint swelling and neck stiffness.   Skin: Negative for rash and wound.   Neurological: Negative for seizures, syncope and facial asymmetry.   Psychiatric/Behavioral: Negative for agitation, confusion and suicidal ideas.     Objective:     Vital Signs (Most Recent):  Temp: 98 °F (36.7 °C) (08/25/20 1640)  Pulse: 77 (08/25/20 1640)  Resp: 20 (08/25/20 1640)  BP: (!) 158/67 (08/25/20 1640)  SpO2: (!) 91 % (08/25/20 1640)  O2 Device (Oxygen Therapy): room air (08/25/20 1640) Vital Signs (24h Range):  Temp:  [97.6 °F (36.4 °C)-99.1 °F (37.3 °C)] 98 °F (36.7 °C)  Pulse:  [63-92] 77  Resp:  [13-33] 20  SpO2:  [91 %-100 %] 91 %  BP: ()/(39-72) 158/67     Weight: (!) 155.1 kg (342 lb) (08/24/20 1440)  Body mass index is 45.12 kg/m².  Body surface area is 2.83 meters squared.    I/O last 3 completed shifts:  In: 1050 [I.V.:1000; IV Piggyback:50]  Out: 1550 [Urine:1550]    Physical Exam  Vitals signs and nursing note reviewed.   Constitutional:       General: He is not in acute distress.     Appearance: He is well-developed. He is obese. He is not diaphoretic.   HENT:      Head: Normocephalic and atraumatic.      Right Ear: External ear normal.      Left Ear:  External ear normal.      Mouth/Throat:      Pharynx: No oropharyngeal exudate.   Eyes:      General: No scleral icterus.        Right eye: No discharge.         Left eye: No discharge.      Conjunctiva/sclera: Conjunctivae normal.      Pupils: Pupils are equal, round, and reactive to light.   Neck:      Musculoskeletal: Normal range of motion.      Thyroid: No thyromegaly.      Trachea: No tracheal deviation.   Cardiovascular:      Rate and Rhythm: Normal rate and regular rhythm.      Heart sounds: No murmur. No friction rub. No gallop.    Pulmonary:      Effort: Pulmonary effort is normal. No respiratory distress.      Breath sounds: Normal breath sounds. No stridor. No wheezing or rales.   Abdominal:      General: Bowel sounds are normal. There is distension.      Palpations: Abdomen is soft.      Tenderness: There is no abdominal tenderness. There is no guarding.   Musculoskeletal:         General: No tenderness or deformity.      Right lower leg: No edema.      Left lower leg: Edema present.   Lymphadenopathy:      Cervical: No cervical adenopathy.   Skin:     Coloration: Skin is not pale.      Findings: No erythema or rash.   Neurological:      Mental Status: He is alert and oriented to person, place, and time.      Cranial Nerves: No cranial nerve deficit.   Psychiatric:         Thought Content: Thought content normal.         Significant Labs:  All labs within the past 24 hours have been reviewed.    Significant Imaging:  Labs: Reviewed  X-Ray: Reviewed  US: Reviewed    Assessment/Plan:     * Acute kidney injury superimposed on chronic kidney disease  -non oliguric kdigo stage 2 most likely secondary to ischemic ATN from hypotension  -with hyperkalemia now resolved  -UA, UPC bland   -urine microscopy bland  -no obvious medication causes, no obvious signs of infection  -he does have polydipsia and polyuria and very labile blood pressures at home  -hold olmesartan and metoprolol for now  -ordered renal doppler  US, serum ck, monitor on tele  -can give normal saline and furosemide if needed for future hyperkalemia    Essential hypertension  -very labile pressures at home  -no history to suggest pheo, migraines, carcinoid, drugs  -positive history of cad  -ordered renal doppler, consider tsh  -hold bp meds for now      Cedric Prieto MD  Nephrology  [facility]    ATTENDING PHYSICIAN ATTESTATION  I have personally verified the history and examined the patient. I thoroughly reviewed the demographic, clinical, laboratorial and imaging information available in medical records. I agree with the assessment and recommendations provided by the subspecialty resident who was under my supervision.

## 2020-08-26 NOTE — HPI
75 year old male with history of ckd 3 and cad who presented to [facility] ed from nephrology clinic for hypotension and dizziness. Patient says he felt his usual self except for slightly more dizzy than usual. He was coming to our clinic to see a nephrologist for the first time per him for kidney disease that was new to him. He says he has frequent changes in bp at home, with some dizziness, never syncope, when he gets up quickly. Never had felt as dizzy as he had on day of admission.   No changes in bp meds recently, and only med change was addition of hydrocodone for pain.   On admission creat elevated to 2.9 from 1.5 and sbp 90's. Given 3 liters ns and sbp improved to 170. Minimal change in creat over night. Nephrology consulted for ra.

## 2020-08-27 ENCOUNTER — TELEPHONE (OUTPATIENT)
Dept: WOUND CARE | Facility: CLINIC | Age: 75
End: 2020-08-27

## 2020-08-27 VITALS
RESPIRATION RATE: 16 BRPM | BODY MASS INDEX: 41.75 KG/M2 | TEMPERATURE: 98 F | DIASTOLIC BLOOD PRESSURE: 65 MMHG | WEIGHT: 315 LBS | HEIGHT: 73 IN | HEART RATE: 74 BPM | OXYGEN SATURATION: 100 % | SYSTOLIC BLOOD PRESSURE: 145 MMHG

## 2020-08-27 LAB
ANION GAP SERPL CALC-SCNC: 7 MMOL/L (ref 8–16)
BASOPHILS # BLD AUTO: 0.08 K/UL (ref 0–0.2)
BASOPHILS NFR BLD: 0.9 % (ref 0–1.9)
BUN SERPL-MCNC: 40 MG/DL (ref 8–23)
CALCIUM SERPL-MCNC: 8.8 MG/DL (ref 8.7–10.5)
CHLORIDE SERPL-SCNC: 109 MMOL/L (ref 95–110)
CO2 SERPL-SCNC: 26 MMOL/L (ref 23–29)
CREAT SERPL-MCNC: 2.3 MG/DL (ref 0.5–1.4)
DIFFERENTIAL METHOD: ABNORMAL
EOSINOPHIL # BLD AUTO: 0.3 K/UL (ref 0–0.5)
EOSINOPHIL NFR BLD: 3.7 % (ref 0–8)
ERYTHROCYTE [DISTWIDTH] IN BLOOD BY AUTOMATED COUNT: 13.5 % (ref 11.5–14.5)
EST. GFR  (AFRICAN AMERICAN): 31.4 ML/MIN/1.73 M^2
EST. GFR  (NON AFRICAN AMERICAN): 27.2 ML/MIN/1.73 M^2
GLUCOSE SERPL-MCNC: 96 MG/DL (ref 70–110)
HCT VFR BLD AUTO: 34.6 % (ref 40–54)
HGB BLD-MCNC: 11 G/DL (ref 14–18)
IMM GRANULOCYTES # BLD AUTO: 0.01 K/UL (ref 0–0.04)
IMM GRANULOCYTES NFR BLD AUTO: 0.1 % (ref 0–0.5)
LYMPHOCYTES # BLD AUTO: 2.1 K/UL (ref 1–4.8)
LYMPHOCYTES NFR BLD: 22.4 % (ref 18–48)
MAGNESIUM SERPL-MCNC: 1.8 MG/DL (ref 1.6–2.6)
MCH RBC QN AUTO: 33.1 PG (ref 27–31)
MCHC RBC AUTO-ENTMCNC: 31.8 G/DL (ref 32–36)
MCV RBC AUTO: 104 FL (ref 82–98)
MONOCYTES # BLD AUTO: 0.6 K/UL (ref 0.3–1)
MONOCYTES NFR BLD: 6.4 % (ref 4–15)
NEUTROPHILS # BLD AUTO: 6.1 K/UL (ref 1.8–7.7)
NEUTROPHILS NFR BLD: 66.5 % (ref 38–73)
NRBC BLD-RTO: 0 /100 WBC
PHOSPHATE SERPL-MCNC: 4.3 MG/DL (ref 2.7–4.5)
PLATELET # BLD AUTO: 130 K/UL (ref 150–350)
PMV BLD AUTO: 11.4 FL (ref 9.2–12.9)
POTASSIUM SERPL-SCNC: 4.9 MMOL/L (ref 3.5–5.1)
RBC # BLD AUTO: 3.32 M/UL (ref 4.6–6.2)
SODIUM SERPL-SCNC: 142 MMOL/L (ref 136–145)
WBC # BLD AUTO: 9.17 K/UL (ref 3.9–12.7)

## 2020-08-27 PROCEDURE — 94761 N-INVAS EAR/PLS OXIMETRY MLT: CPT | Mod: HCNC

## 2020-08-27 PROCEDURE — 85025 COMPLETE CBC W/AUTO DIFF WBC: CPT | Mod: HCNC

## 2020-08-27 PROCEDURE — 80048 BASIC METABOLIC PNL TOTAL CA: CPT | Mod: HCNC

## 2020-08-27 PROCEDURE — 99239 PR HOSPITAL DISCHARGE DAY,>30 MIN: ICD-10-PCS | Mod: HCNC,GC,, | Performed by: STUDENT IN AN ORGANIZED HEALTH CARE EDUCATION/TRAINING PROGRAM

## 2020-08-27 PROCEDURE — 84100 ASSAY OF PHOSPHORUS: CPT | Mod: HCNC

## 2020-08-27 PROCEDURE — 99900035 HC TECH TIME PER 15 MIN (STAT): Mod: HCNC

## 2020-08-27 PROCEDURE — 25000003 PHARM REV CODE 250: Mod: HCNC | Performed by: STUDENT IN AN ORGANIZED HEALTH CARE EDUCATION/TRAINING PROGRAM

## 2020-08-27 PROCEDURE — 94660 CPAP INITIATION&MGMT: CPT | Mod: HCNC

## 2020-08-27 PROCEDURE — 83735 ASSAY OF MAGNESIUM: CPT | Mod: HCNC

## 2020-08-27 PROCEDURE — 99239 HOSP IP/OBS DSCHRG MGMT >30: CPT | Mod: HCNC,GC,, | Performed by: STUDENT IN AN ORGANIZED HEALTH CARE EDUCATION/TRAINING PROGRAM

## 2020-08-27 PROCEDURE — 36415 COLL VENOUS BLD VENIPUNCTURE: CPT | Mod: HCNC

## 2020-08-27 RX ADMIN — VENLAFAXINE 75 MG: 75 TABLET ORAL at 10:08

## 2020-08-27 RX ADMIN — ACYCLOVIR 800 MG: 800 TABLET ORAL at 10:08

## 2020-08-27 RX ADMIN — TAMSULOSIN HYDROCHLORIDE 0.4 MG: 0.4 CAPSULE ORAL at 10:08

## 2020-08-27 RX ADMIN — CLOPIDOGREL 75 MG: 75 TABLET, FILM COATED ORAL at 10:08

## 2020-08-27 RX ADMIN — AZELASTINE HYDROCHLORIDE 137 MCG: 137 SPRAY, METERED NASAL at 10:08

## 2020-08-27 RX ADMIN — GABAPENTIN 300 MG: 300 CAPSULE ORAL at 10:08

## 2020-08-27 RX ADMIN — METOPROLOL SUCCINATE 25 MG: 25 TABLET, EXTENDED RELEASE ORAL at 10:08

## 2020-08-27 RX ADMIN — OXYBUTYNIN CHLORIDE 5 MG: 5 TABLET, EXTENDED RELEASE ORAL at 10:08

## 2020-08-27 RX ADMIN — APIXABAN 5 MG: 5 TABLET, FILM COATED ORAL at 10:08

## 2020-08-27 RX ADMIN — ATORVASTATIN CALCIUM 40 MG: 20 TABLET, FILM COATED ORAL at 10:08

## 2020-08-27 RX ADMIN — FLUTICASONE PROPIONATE 50 MCG: 50 SPRAY, METERED NASAL at 10:08

## 2020-08-27 NOTE — TELEPHONE ENCOUNTER
----- Message from Colt Lester sent at 8/27/2020  8:06 AM CDT -----  Patient called to speak w/ someone regarding rescheduling his upcoming appt due to him currently being admitted to the hospital, requesting callback [phone]

## 2020-08-27 NOTE — SUBJECTIVE & OBJECTIVE
Interval History: NAEON. Urine output 2870 ml yesterday. BP has been stable. Cr trending down. From a medical stand point, patient is clear for discharge. He will need to follow up with nephrology and has an established appointment with wound care outpatient tomorrow.     Review of Systems  Objective:     Vital Signs (Most Recent):  Temp: 97.9 °F (36.6 °C) (08/27/20 1154)  Pulse: 74 (08/27/20 1154)  Resp: 16 (08/27/20 1154)  BP: (!) 145/65 (08/27/20 1154)  SpO2: 100 % (08/27/20 1154) Vital Signs (24h Range):  Temp:  [97.5 °F (36.4 °C)-98.5 °F (36.9 °C)] 97.9 °F (36.6 °C)  Pulse:  [64-97] 74  Resp:  [13-17] 16  SpO2:  [92 %-100 %] 100 %  BP: (117-169)/(51-65) 145/65     Weight: (!) 155 kg (341 lb 11.4 oz)  Body mass index is 45.08 kg/m².    Intake/Output Summary (Last 24 hours) at 8/27/2020 1339  Last data filed at 8/27/2020 1200  Gross per 24 hour   Intake 1060 ml   Output 3070 ml   Net -2010 ml      Physical Exam    Significant Labs:   CBC:   Recent Labs   Lab 08/26/20  0335 08/27/20  0607   WBC 9.45 9.17   HGB 11.1* 11.0*   HCT 35.1* 34.6*   * 130*     CMP:   Recent Labs   Lab 08/26/20  0335 08/26/20  1226 08/27/20  0607    142 142   K 5.3* 4.9 4.9   * 109 109   CO2 20* 25 26    96 96   BUN 46* 42* 40*   CREATININE 2.6* 2.4* 2.3*   CALCIUM 8.7 8.8 8.8   ANIONGAP 10 8 7*   EGFRNONAA 23.4* 25.8* 27.2*       Significant Imaging: I have reviewed all pertinent imaging results/findings within the past 24 hours.

## 2020-08-27 NOTE — ASSESSMENT & PLAN NOTE
Patient reports left leg weakness. He has a left leg wound from squamous carcinoma.    - Would care consulted for wound  - Follow up in wound care clinic on Friday
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
## 2020-08-27 NOTE — PLAN OF CARE
Future Appointments   Date Time Provider Department Center   8/28/2020  8:40 AM Marzena Laurent, NP [facility] WOUND [address]   9/1/2020  2:30 PM [facility] XRORTHO1 485 LB LIMIT [facility] XRAYORT [address] [facility]   9/1/2020  3:00 PM Rad Swift MD [facility] ORTHO [address]   9/2/2020  2:40 PM Marzena Laurent, NP [facility] WOUND [address]   9/5/2020 11:00 AM HOME MONITOR DEVICE CHECK, [facility] ARRHPRO [address]   9/9/2020  3:20 PM Marzena Laurent, NP [facility] WOUND [address]   9/16/2020 10:20 AM Marzena Laurent, NP [facility] WOUND [address]   9/23/2020  2:20 PM Marzena Laurent, NP [facility] WOUND [address]   9/24/2020  1:00 PM Sukhi Chaudhry MD [facility] VAS [facility]   9/28/2020  4:00 PM Bianca Kumar MD [facility] DERM [address]   9/30/2020  2:20 PM Marzena Laurent, NP [facility] WOUND [address]   10/22/2020 10:30 AM Sandra Gary DPM [facility] POD [place]   10/27/2020  1:30 PM Nico Lackey MD [facility] GASTRO [address]           08/27/20 1507   Final Note   Assessment Type Final Discharge Note   Anticipated Discharge Disposition Home   What phone number can be called within the next 1-3 days to see how you are doing after discharge? [phone]   Hospital Follow Up  Appt(s) scheduled? Yes  (Ambulatory referral sent to Nephrology.)   Right Care Referral Info   Post Acute Recommendation No Care   Post-Acute Status   Post-Acute Authorization Other   Other Status No Post-Acute Service Needs   Discharge Delays None known at this time

## 2020-08-27 NOTE — TELEPHONE ENCOUNTER
Returned call and spoke with patient's wife who advised patient is currently in the hospital awaiting test but may get discharged later today and needs to reschedule missed appointment due to hospitalization.  Offered tomorrow at 840am, accepted appointment date/time and given desk number [phone] to call if patient remains in hospital.  Patient has follow up appointment next week on 9/2/2020.

## 2020-08-27 NOTE — PLAN OF CARE
Pt AAOx4, VSS, no c/o pain or sob throughout shift, pt safety maintained, POC discussed with pt, no acute changes, call light within reach, WCTM

## 2020-08-27 NOTE — NURSING
Patient discharged with personal belongings. Brought down by wheelchair. Will be driving himself home.

## 2020-08-27 NOTE — PLAN OF CARE
AAO x 4. VSS. Patient up to chair waiting to be discharged. Refused dressing change, said he has an appointment tomorrow with wound care. No complaints of SOB or pain. Patient safety maintained. Will continue to monitor.

## 2020-08-28 ENCOUNTER — OFFICE VISIT (OUTPATIENT)
Dept: WOUND CARE | Facility: CLINIC | Age: 75
End: 2020-08-28
Payer: MEDICARE

## 2020-08-28 ENCOUNTER — PATIENT OUTREACH (OUTPATIENT)
Dept: ADMINISTRATIVE | Facility: CLINIC | Age: 75
End: 2020-08-28

## 2020-08-28 VITALS
DIASTOLIC BLOOD PRESSURE: 64 MMHG | TEMPERATURE: 98 F | WEIGHT: 315 LBS | SYSTOLIC BLOOD PRESSURE: 137 MMHG | BODY MASS INDEX: 41.75 KG/M2 | HEART RATE: 87 BPM | HEIGHT: 73 IN

## 2020-08-28 DIAGNOSIS — T81.89XA DELAYED SURGICAL WOUND HEALING, INITIAL ENCOUNTER: Primary | ICD-10-CM

## 2020-08-28 DIAGNOSIS — I87.2 VENOUS STASIS DERMATITIS OF BOTH LOWER EXTREMITIES: ICD-10-CM

## 2020-08-28 DIAGNOSIS — I83.899 VARICOSE VEINS OF LEG WITH COMPLICATIONS: ICD-10-CM

## 2020-08-28 DIAGNOSIS — N18.30 CHRONIC KIDNEY DISEASE, STAGE III (MODERATE): Primary | ICD-10-CM

## 2020-08-28 PROCEDURE — 99999 PR PBB SHADOW E&M-EST. PATIENT-LVL V: ICD-10-PCS | Mod: PBBFAC,HCNC,, | Performed by: NURSE PRACTITIONER

## 2020-08-28 PROCEDURE — 99999 PR PBB SHADOW E&M-EST. PATIENT-LVL V: CPT | Mod: PBBFAC,HCNC,, | Performed by: NURSE PRACTITIONER

## 2020-08-28 PROCEDURE — 99499 NO LOS: ICD-10-PCS | Mod: HCNC,S$GLB,, | Performed by: NURSE PRACTITIONER

## 2020-08-28 PROCEDURE — 29580 STRAPPING UNNA BOOT: CPT | Mod: HCNC,LT,S$GLB, | Performed by: NURSE PRACTITIONER

## 2020-08-28 PROCEDURE — 29580 PR STRAPPING UNNA BOOT: ICD-10-PCS | Mod: HCNC,LT,S$GLB, | Performed by: NURSE PRACTITIONER

## 2020-08-28 PROCEDURE — 99499 UNLISTED E&M SERVICE: CPT | Mod: HCNC,S$GLB,, | Performed by: NURSE PRACTITIONER

## 2020-08-28 NOTE — PROGRESS NOTES
Subjective:       Patient ID: Janusz Montgomery Jr. is a 75 y.o. white male who presents for follow-up evaluation of   Chronic Kidney Disease    HPI     Patient seen by me last week.  Last seen by Dr. Moctezuma in December 2017; lost to follow up since then. He was followed by Dr. 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 Moctezuma.      Patient presents for f/u of CKD and hospital follow up.  Baseline creatinine of 1.5-1.6 mg/dL since Feb 2020; prior to that, it was 1.01-1.2 mg/dL.  Appears that patient had NAT in Feb 2020 that never returned to baseline. Elevated sCr initially noted on 2/11; patient had cardiac cath on 2/14 and was stented. No longer on lasix; says a provider told him to stop it due to renal function, but he feels like he needs it for his edema. Has been working on elevating legs and low salt diet.    Patient went to ER after clinic appt with me last week to be evaluated for symptomatic hypotension. He was discharged 8/27/20. He was found to have an NAT c sCr of 2.9 mg/dL and admitted. NAT attributed to ATN from hypotension. sCr at discharge was 2.3 mg/dL. Lasix and olmesartan d/c at discharge.    Reports that edema has not worsened since d/c of lasix; said he only took it about weekly PRN anyway.  Has increased water intake - drank about 3-4 glasses yesterday.    Significant hx includes HLD, CAD s angina, HTN, skin ulcer, BCC, sepsis, AKIs, afib    The patient denies taking NSAIDs, recreational drugs, recent episode of dehydration, diarrhea, nausea or vomiting, acute illness, hospitalization or exposure to IV radiocontrast. On Plavix and Eliquis. Says he is often on antibiotics. Takes melatonin.     Significant family hx includes: alzheimer's, HTN, skin ca, lung ca, PVD. No known renal disease.    Last renal US: 8/2020, reviewed.    Review of Systems   Constitutional: Positive for fatigue. Negative for appetite change and fever.   HENT: Negative for facial swelling.     Respiratory: Positive for shortness of breath (no worse since stopping lasix p hospital d/c).  Negative for cough and wheezing.    Cardiovascular: Positive for leg swelling. Negative for chest pain and palpitations.   Gastrointestinal: Negative for constipation, diarrhea, nausea and vomiting.   Endocrine: Positive for polydipsia and polyuria. Polyphagia.  Genitourinary: Positive for frequency and urgency. Negative for difficulty urinating, dysuria, flank pain and hematuria.   Musculoskeletal: Negative for back pain.   Skin: Positive for wound (to left leg).   Neurological: Dizziness improved.  Psychiatric/Behavioral: Sleep improved.      Objective:       Blood pressure (!) 140/62, pulse 81, weight (!) 154 kg (339 lb 8.1 oz), SpO2 96 %.  Physical Exam  Vitals signs reviewed.   Constitutional:       General: He is not in acute distress.     Appearance: Normal appearance. He is well-developed. He is obese.   Eyes:      Conjunctiva/sclera: Conjunctivae normal.   Neck:      Musculoskeletal: Neck supple.      Vascular: No JVD.   Cardiovascular:      Rate and Rhythm: Normal rate and regular rhythm.      Heart sounds: Normal heart sounds. No murmur. No friction rub. No gallop.    Pulmonary:      Effort: Pulmonary effort is normal. No respiratory distress.      Breath sounds: Normal breath sounds.   Abdominal:      General: Bowel sounds are normal. There is no distension.      Palpations: Abdomen is soft.      Tenderness: There is no abdominal tenderness. There is no right CVA tenderness or left CVA tenderness.   Musculoskeletal:         General: Swelling (trace to right leg; left leg wrapped) present.   Skin:    Redness to RLE and scabs from scratching; left leg wrapped.    Neurological:      Mental Status: He is alert and oriented to person, place, and time.   Psychiatric:         Mood and Affect: Mood normal.         Behavior: Behavior normal.         Thought Content: Thought content normal.         Judgment: Judgment  normal.           Lab Results   Component Value Date    CREATININE 2.3 (H) 08/31/2020     Prot/Creat Ratio, Ur   Date Value Ref Range Status   08/24/2020 0.12 0.00 - 0.20 Final   03/17/2017 0.15 0.00 - 0.20 Final     Lab Results   Component Value Date     08/31/2020    K 5.2 (H) 08/31/2020    CO2 23 08/31/2020     08/31/2020     Lab Results   Component Value Date    .0 (H) 12/12/2017    CALCIUM 8.9 08/31/2020    PHOS 3.9 08/31/2020     Lab Results   Component Value Date    HGB 10.6 (L) 08/31/2020    WBC 9.37 08/31/2020    HCT 33.3 (L) 08/31/2020      Lab Results   Component Value Date    HGBA1C 5.6 02/11/2020     (L) 08/31/2020    BUN 42 (H) 08/31/2020     Lab Results   Component Value Date    LDLCALC 104.0 02/11/2020         Assessment:       1. CKD (chronic kidney disease) stage 3, GFR 30-59 ml/min    2. Hypertension, unspecified type    3. NAT (acute kidney injury)        Plan:   CKD stage 3B c eGFR 42-45 mL/min - likely 2/2 HTN nephrosclerosis and atherosclerotic disease, and age-related nephron loss. Has progressed this year but cause of progression is unclear. NAT last week likely due to ATN from hypotension. sCr trending downward during admission but has not changed since discharge. Educated patient to control BP, BG, remain well-hydrated, and avoid NSAIDs to prevent progression of CKD.    UPCR Non-proteinuric. Olmesartan stopped in setting of NAT and hypotension   Acid-base WNL.   Renal osteodystrophy Ca okay. On vitamin D 1000 daily. Will repeat PTH, vit D for next visit.   Anemia Hgb at goal for CKD.   DM N/a.   Lipid Management Okay on statin per PCP.   ESRD planning Anticipatory guidance provided about timing of dialysis. Start discussions and planning when eGFR is about 20 mL/min; most patients start dialysis between 5-10 mL/min.     HTN - High today, but pt took BP medication on the way to clinic. On metoprolol succinate 25 mg. Lasix and olmesartan stopped recently after  hospitalization with hypotension and NAT. Goal is SBP < 140.    Hyperkalemia - Olmesartan already d/c at discharge. Lasix also recently stopped. Hydrate well with water and low K diet. K finder handout provided.    All questions patient had were answered.  Asked if further questions. None. F/u in clinic in 3 mos  with labs and urine prior to next visit or sooner if needed.  ER for emergency concerns.    Summary of Plan:  1. Increase hydration and repeat labs in a week  2. Low potassium diet  3. avoid NSAID/ bactrim/ IV contrast/ gadolinium/ aminoglycoside where possible  4. RTC in 3 mos

## 2020-08-28 NOTE — PROGRESS NOTES
Subjective:       Patient ID: Janusz Montgomery Jr. is a 75 y.o. male.    Chief Complaint: Wound Check    Wound Check  This is a 75 year old male referred by Dr. Kumar for evaluation and management of an open wound to the left lower leg.  He was seen by Dr. Kumar on 6/12/20 at which time he presented for evaluation of his wound which had been present for the past year. He requested and underwent a biopsy of the leg wound on that date.  The biopsy was positive for BCC.  He used efudex for about a month.  He was seen at [facility] wound care on 6/17/20 and kirsten and a coflex compression wrap was applied to the leg.  A coflex compression wrap was placed on the leg last visit.  The wound is healing as evidenced by increased granulation and wound contracture. He has neuropathy and is on gabapentin.  He is afebrile. He denies increased redness, swelling or purulent drainage.  He does not complain of pain.    Patient Active Problem List   Diagnosis    Essential hypertension    Other hyperlipidemia    Coronary artery disease involving native coronary artery of native heart without angina pectoris s/p RCA stent    Obstructive sleep apnea    GERD (gastroesophageal reflux disease)    Benign prostatic hyperplasia with urinary obstruction    Morbid obesity with body mass index of 45.0-49.9 in adult    Prostate cancer    Paroxysmal atrial fibrillation     Major depressive disorder, recurrent episode, mild    Generalized anxiety disorder    History of gout    Herpes simplex keratoconjunctivitis    Decreased range of motion of left ankle    Right leg weakness    Impaired mobility    Balance problems    PVD (peripheral vascular disease)    Bilateral leg edema    Bilateral carotid artery stenosis    Gait abnormality    Left leg weakness    Delayed sleep phase syndrome    Floppy eyelid syndrome    Dyspnea    Insomnia    Restrictive lung disease    Personal history of asbestosis    CAD  "(coronary artery disease)    Varicose veins of leg with complications    Delayed surgical wound healing    Venous stasis dermatitis of both lower extremities    Acute kidney injury superimposed on chronic kidney disease    CKD (chronic kidney disease) stage 3, GFR 30-59 ml/min    Hypotension     Past Medical History:   Diagnosis Date    NAT (acute kidney injury) 3/19/2017    ALLERGIC RHINITIS     Anemia     Anxiety     Basal cell carcinoma 10/19/2018    forehead and right medial shoulder    Basal cell carcinoma 01/09/2019    left nasal bridge and left posterior ear    Chronic rhinitis 5/3/2013    Chronic rhinitis 5/3/2013    Coronary artery disease involving native coronary artery of native heart without angina pectoris s/p RCA stent     Cortical cataract of both eyes 3/18/2016    Delayed sleep phase syndrome 3/13/2019    Depression     Erectile dysfunction 3/24/2014    Erectile dysfunction 3/24/2014    Essential hypertension     GERD (gastroesophageal reflux disease) 7/25/2012    Gout, arthritis     Grade III open fracture of left tibia and fibula s/p ex-fix on 7/1/16 and ORIF of left tibia on 7/15 7/6/2016    H/O: iritis     Helicobacter pylori (H. pylori) infection     Treated    Herpes simplex keratoconjunctivitis 9/30/2015    - on acyclovir - followed by opthalmology, Dr. Uribe     Herpes simplex keratoconjunctivitis 9/30/2015    - on acyclovir - followed by opthalmology, Dr. Uribe     Hyperkalemia 2/28/2017    Hyperlipidemia     Hypogonadism male     Hypogonadism male     Mixed anxiety and depressive disorder     Morbid obesity     Obstructive sleep apnea on CPAP     CPAP    Osteoarthritis of left knee 7/25/2012    Paroxysmal atrial fibrillation 7/6/2016    Primary osteoarthritis of left knee 7/25/2012    Prominent aorta 1/25/2016    "RESULTS: THE HEART IS MILDLY ENLARGED WITH A SLIGHTLY PROMINENT AORTA" - Xray Chest PA & Lateral 12-     Prostate cancer " "2/15/2016    - followed by urology, Dr. Young     Prostate cancer 2/15/2016    - followed by urology, Dr. 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 Conteh    CATARACT EXTRACTION W/  INTRAOCULAR LENS IMPLANT Left 4/12/2016        COLONOSCOPY N/A 7/23/2020    Procedure: COLONOSCOPY;  Surgeon: Nico Lackey MD;  Location: [facility] ([address] [place] [address]);  Service: Endoscopy;  Laterality: N/A;    ESOPHAGOGASTRODUODENOSCOPY N/A 7/23/2020    Procedure: EGD (ESOPHAGOGASTRODUODENOSCOPY);  Surgeon: Nico Lackey MD;  Location: [facility] ([address] [place] [address]);  Service: Endoscopy;  Laterality: N/A;  per Dr Lackey-Will proceed with EGD and colonoscopy on the 2nd floor due to his comorbidities including obesity, sleep apnea, restrictive lung disease, coronary artery disease.  has loop recorder      ok to hold Eliquis 2 days per Dr Sandhu-must remain    EXTERNAL FIXATION TIBIAL FRACTURE Left 07/01/2016    INSERTION OF IMPLANTABLE LOOP RECORDER  04/07/2017    LEFT HEART CATHETERIZATION Left 1/30/2020    Procedure: Left heart cath;  Surgeon: Benjamin Sandhu MD;  Location: [facility] CATH LAB;  Service: Cardiology;  Laterality: Left;  RN PREOP 1/28/20--Pt starting Plavix loading dose today (8pills)- Dr. Sandhu aware.  Pt has a bandaged "non healing area to LLE"--Dr. Sandhu aware.    LEFT HEART CATHETERIZATION Left 2/14/2020    Procedure: Left heart cath, IVUS " guided left main / LAD PCI. Noon start, radial approach;  Surgeon: Miguel Angel Brady MD;  Location: [facility] CATH LAB;  Service: Cardiology;  Laterality: Left;  RN PRE OP 2-7-2020  BMI--45.61    ORIF TIBIA FRACTURE Left 07/15/2016    RADIOACTIVE SEED IMPLANTATION OF PROSTATE N/A 8/8/2018    Procedure: INSERTION, RADIOACTIVE SEED, PROSTATE;  Surgeon: Bipin Thompson MD;  Location: [facility] OR [address] [place] [address];  Service: Urology;  Laterality: N/A;  1 hour    Squamous cell cancer removal x3 with Mohs surgery      TONSILLECTOMY      TOTAL KNEE ARTHROPLASTY  10/2012    trus/bx      ULTRASOUND GUIDANCE  2/14/2020    Procedure: Ultrasound Guidance;  Surgeon: Miguel Angel Brady MD;  Location: [facility] CATH LAB;  Service: Cardiology;;     Review of Systems    Unchanged from previous visit.  Objective:      Physical Exam  Vitals signs and nursing note reviewed.   Constitutional:       General: He is not in acute distress.     Appearance: He is well-developed. He is not diaphoretic.   HENT:      Head: Normocephalic and atraumatic.   Pulmonary:      Effort: Pulmonary effort is normal. No respiratory distress.   Musculoskeletal: Normal range of motion.         General: No tenderness.        Legs:    Skin:     General: Skin is warm and dry.      Findings: No erythema or rash.   Neurological:      Mental Status: He is alert and oriented to person, place, and time.   Psychiatric:         Behavior: Behavior normal.         Thought Content: Thought content normal.         Judgment: Judgment normal.                 Janusz was seen in the clinic room and placed in the supine position on the treatment table.  The left leg was cleansed with Easi-clense sponges and dried thoroughly.  Eucerin cream was applied to the lower legs. Calmoseptine was applied to the periwound area. Medihoney gel and a hydrofiber dressing was applied to the wound and covered with an ABD pad.  The patient's foot was positioned at a 90 degree angle.  The coflex with calamine was  applied per package instructions.  A two layered application was performed using a spiral technique beginning with the foam layer followed by the cohesive bandage avoiding creases or folds.  The wrap was started behind the first metatarsal and ended below the tibial tubercle of the knee.  There was overlap of each turn half the width of the previous turn.  The compression wrap will be changed every 7 days.    Assessment:       1. Delayed surgical wound healing, initial encounter    2. Varicose veins of leg with complications    3. Venous stasis dermatitis of both lower extremities               Plan:            Increase gabapentin to 300 mg three times daily.  Triamncinolone cream to affected area on legs.  Coflex compression wrap with calamine left lower leg as detailed above.  Patient was warned not to get the dressings wet and to use cast covers for showering.  Should the dressing become wet, he is to remove it, place a wet-to-dry dressing over the wound, cover with gauze and roll gauze and use ace wraps for compression and to secure bandages.  He should then notify this office as soon as possible to have a new dressing applied.  Return to clinic in one week.

## 2020-08-28 NOTE — PATIENT INSTRUCTIONS
Discharge Instructions for Acute Kidney Injury  You have been diagnosed with acute kidney injury. This means that your kidneys are not working properly. When both kidneys are healthy, they help filter out fluid and waste from the blood and body. Acute kidney injury has many causes. These include urinary blockages, infection, lack of enough blood supply, and medicines that can injure kidneys. In some cases, acute kidney injury is short-term (temporary), lasting several days to a few months. This is because the kidney can repair itself. But acute kidney injury can also result in chronic kidney disease or end stage renal failure. Here are some instructions for you to follow as you recover.  Home care  · Follow any instructions for eating and drinking given to you by your healthcare provider.  ¨ Drink less fluid, if instructed by your healthcare provider.  ¨ Keep a record of everything you eat and drink.  · Measure the amount of urine and stool you have each day.  · Weigh yourself every day, at the same time of day, and in the same kind of clothes. Keep a daily record of your daily weights.  · Take your temperature every day. Keep a record of the results.  · Learn to take your own blood pressure. Keep a record of your results. Ask your healthcare provider when you should seek emergency medical attention. Your provider will tell you which blood pressure reading is dangerous.  · Avoid contact with people who have infections (colds, bronchitis, or skin conditions).  · Practice good personal hygiene. This is especially important if you have a catheter in place when you leave the hospital. Doing so helps keep you safe from infection.  · Take your medicines exactly as directed.  · You may require frequent blood and urine tests to monitor your kidney function.  Follow-up care  Follow up with your healthcare provider, or as advised.  When to seek medical care  Call your healthcare provider right away if you have any of the  following:  · Signs of bladder infection (urinating more often than usual, or burning, pain, bleeding, or hesitancy when you urinate)  · Signs of infection around your catheter (redness, swelling, warmth, or drainage)  · Rapid weight loss or weight gain, such as 3 pounds or more in 24 hours or 6 pounds or more in 7 days  · Fever above 100.4°F (38.0°C) or chills  · Muscle aches  · Night sweats  · Very little or no urine output  · Swelling of your hands, legs, or feet  · Back pain  · Abdominal pain  · Extreme tiredness     © 4572-5310 [employer]. [address] [place] [address], [place] [address]. All rights reserved. This information is not intended as a substitute for professional medical care. Always follow your healthcare professional's instructions.

## 2020-08-28 NOTE — PROGRESS NOTES
C3 nurse attempted to contact patient. The following occurred:   C3 nurse attempted to contact Janusz Montgomery Jr. for a TCC post hospital discharge follow up call. The patient is unable to conduct the call @ this time. The patient requested a callback.    The patient does not have a scheduled HOSFU appointment within 7-14 days post hospital discharge date 8/27/20. Message sent to Physician staff to assist with HOSFU appointment scheduling.

## 2020-08-28 NOTE — PHARMACY MED REC
"Admission Medication Reconciliation - Pharmacy Consult Note    The home medication history was taken by NAME, TITLE.  Based on information gathered and subsequent review by the clinical pharmacist, the items below may need attention.    You may go to "Admission" then "Reconcile Home Medications" tabs to review and/or act upon these items.        No issues noted with the medication reconciliation.    Please address this information as you see fit.  Feel free to contact us if you have any questions or require assistance.    Dorina Ramirez, PharmD  [id number]                .    .          "

## 2020-08-31 ENCOUNTER — LAB VISIT (OUTPATIENT)
Dept: LAB | Facility: HOSPITAL | Age: 75
End: 2020-08-31
Attending: NURSE PRACTITIONER
Payer: MEDICARE

## 2020-08-31 DIAGNOSIS — N18.30 CHRONIC KIDNEY DISEASE, STAGE III (MODERATE): ICD-10-CM

## 2020-08-31 LAB
ALBUMIN SERPL BCP-MCNC: 3.7 G/DL (ref 3.5–5.2)
ANION GAP SERPL CALC-SCNC: 9 MMOL/L (ref 8–16)
BASOPHILS # BLD AUTO: 0.07 K/UL (ref 0–0.2)
BASOPHILS NFR BLD: 0.7 % (ref 0–1.9)
BUN SERPL-MCNC: 42 MG/DL (ref 8–23)
CALCIUM SERPL-MCNC: 8.9 MG/DL (ref 8.7–10.5)
CHLORIDE SERPL-SCNC: 109 MMOL/L (ref 95–110)
CO2 SERPL-SCNC: 23 MMOL/L (ref 23–29)
CREAT SERPL-MCNC: 2.3 MG/DL (ref 0.5–1.4)
DIFFERENTIAL METHOD: ABNORMAL
EOSINOPHIL # BLD AUTO: 0.4 K/UL (ref 0–0.5)
EOSINOPHIL NFR BLD: 4.7 % (ref 0–8)
ERYTHROCYTE [DISTWIDTH] IN BLOOD BY AUTOMATED COUNT: 13.3 % (ref 11.5–14.5)
EST. GFR  (AFRICAN AMERICAN): 31 ML/MIN/1.73 M^2
EST. GFR  (NON AFRICAN AMERICAN): 27 ML/MIN/1.73 M^2
GLUCOSE SERPL-MCNC: 104 MG/DL (ref 70–110)
HCT VFR BLD AUTO: 33.3 % (ref 40–54)
HGB BLD-MCNC: 10.6 G/DL (ref 14–18)
IMM GRANULOCYTES # BLD AUTO: 0.02 K/UL (ref 0–0.04)
IMM GRANULOCYTES NFR BLD AUTO: 0.2 % (ref 0–0.5)
LYMPHOCYTES # BLD AUTO: 2.4 K/UL (ref 1–4.8)
LYMPHOCYTES NFR BLD: 25.7 % (ref 18–48)
MCH RBC QN AUTO: 32.6 PG (ref 27–31)
MCHC RBC AUTO-ENTMCNC: 31.8 G/DL (ref 32–36)
MCV RBC AUTO: 103 FL (ref 82–98)
MONOCYTES # BLD AUTO: 0.5 K/UL (ref 0.3–1)
MONOCYTES NFR BLD: 5.3 % (ref 4–15)
NEUTROPHILS # BLD AUTO: 5.9 K/UL (ref 1.8–7.7)
NEUTROPHILS NFR BLD: 63.4 % (ref 38–73)
NRBC BLD-RTO: 0 /100 WBC
PHOSPHATE SERPL-MCNC: 3.9 MG/DL (ref 2.7–4.5)
PLATELET # BLD AUTO: 136 K/UL (ref 150–350)
PMV BLD AUTO: 11.1 FL (ref 9.2–12.9)
POTASSIUM SERPL-SCNC: 5.2 MMOL/L (ref 3.5–5.1)
RBC # BLD AUTO: 3.25 M/UL (ref 4.6–6.2)
SODIUM SERPL-SCNC: 141 MMOL/L (ref 136–145)
WBC # BLD AUTO: 9.37 K/UL (ref 3.9–12.7)

## 2020-08-31 PROCEDURE — 85025 COMPLETE CBC W/AUTO DIFF WBC: CPT | Mod: HCNC

## 2020-08-31 PROCEDURE — 36415 COLL VENOUS BLD VENIPUNCTURE: CPT | Mod: HCNC

## 2020-08-31 PROCEDURE — 80069 RENAL FUNCTION PANEL: CPT | Mod: HCNC

## 2020-09-01 ENCOUNTER — OFFICE VISIT (OUTPATIENT)
Dept: NEPHROLOGY | Facility: CLINIC | Age: 75
End: 2020-09-01
Payer: MEDICARE

## 2020-09-01 ENCOUNTER — HOSPITAL ENCOUNTER (OUTPATIENT)
Dept: RADIOLOGY | Facility: HOSPITAL | Age: 75
Discharge: HOME | End: 2020-09-01
Attending: ORTHOPAEDIC SURGERY
Payer: MEDICARE

## 2020-09-01 ENCOUNTER — OFFICE VISIT (OUTPATIENT)
Dept: ORTHOPEDICS | Facility: CLINIC | Age: 75
End: 2020-09-01
Payer: MEDICARE

## 2020-09-01 VITALS
SYSTOLIC BLOOD PRESSURE: 140 MMHG | BODY MASS INDEX: 44.79 KG/M2 | DIASTOLIC BLOOD PRESSURE: 62 MMHG | WEIGHT: 315 LBS | OXYGEN SATURATION: 96 % | HEART RATE: 81 BPM

## 2020-09-01 DIAGNOSIS — I10 HYPERTENSION, UNSPECIFIED TYPE: ICD-10-CM

## 2020-09-01 DIAGNOSIS — E87.5 HYPERKALEMIA: ICD-10-CM

## 2020-09-01 DIAGNOSIS — G89.29 CHRONIC PAIN OF RIGHT THUMB: ICD-10-CM

## 2020-09-01 DIAGNOSIS — M65.311 TRIGGER FINGER OF RIGHT THUMB: Primary | ICD-10-CM

## 2020-09-01 DIAGNOSIS — N18.30 CKD (CHRONIC KIDNEY DISEASE) STAGE 3, GFR 30-59 ML/MIN: Primary | ICD-10-CM

## 2020-09-01 DIAGNOSIS — M79.644 CHRONIC PAIN OF RIGHT THUMB: ICD-10-CM

## 2020-09-01 DIAGNOSIS — N17.9 AKI (ACUTE KIDNEY INJURY): ICD-10-CM

## 2020-09-01 PROCEDURE — 1159F MED LIST DOCD IN RCRD: CPT | Mod: HCNC,S$GLB,, | Performed by: NURSE PRACTITIONER

## 2020-09-01 PROCEDURE — 1101F PR PT FALLS ASSESS DOC 0-1 FALLS W/OUT INJ PAST YR: ICD-10-PCS | Mod: HCNC,CPTII,S$GLB, | Performed by: NURSE PRACTITIONER

## 2020-09-01 PROCEDURE — 1101F PR PT FALLS ASSESS DOC 0-1 FALLS W/OUT INJ PAST YR: ICD-10-PCS | Mod: HCNC,CPTII,S$GLB, | Performed by: ORTHOPAEDIC SURGERY

## 2020-09-01 PROCEDURE — 3077F SYST BP >= 140 MM HG: CPT | Mod: HCNC,CPTII,S$GLB, | Performed by: NURSE PRACTITIONER

## 2020-09-01 PROCEDURE — 1126F AMNT PAIN NOTED NONE PRSNT: CPT | Mod: HCNC,S$GLB,, | Performed by: NURSE PRACTITIONER

## 2020-09-01 PROCEDURE — 99499 UNLISTED E&M SERVICE: CPT | Mod: HCNC,S$GLB,, | Performed by: NURSE PRACTITIONER

## 2020-09-01 PROCEDURE — 20550 NJX 1 TENDON SHEATH/LIGAMENT: CPT | Mod: F5,HCNC,S$GLB, | Performed by: ORTHOPAEDIC SURGERY

## 2020-09-01 PROCEDURE — 99499 RISK ADDL DX/OHS AUDIT: ICD-10-PCS | Mod: HCNC,S$GLB,, | Performed by: NURSE PRACTITIONER

## 2020-09-01 PROCEDURE — 99999 PR PBB SHADOW E&M-EST. PATIENT-LVL V: CPT | Mod: PBBFAC,HCNC,, | Performed by: NURSE PRACTITIONER

## 2020-09-01 PROCEDURE — 3078F PR MOST RECENT DIASTOLIC BLOOD PRESSURE < 80 MM HG: ICD-10-PCS | Mod: HCNC,CPTII,S$GLB, | Performed by: ORTHOPAEDIC SURGERY

## 2020-09-01 PROCEDURE — 73140 X-RAY EXAM OF FINGER(S): CPT | Mod: TC,HCNC,RT

## 2020-09-01 PROCEDURE — 1101F PT FALLS ASSESS-DOCD LE1/YR: CPT | Mod: HCNC,CPTII,S$GLB, | Performed by: NURSE PRACTITIONER

## 2020-09-01 PROCEDURE — 99999 PR PBB SHADOW E&M-EST. PATIENT-LVL II: ICD-10-PCS | Mod: PBBFAC,HCNC,, | Performed by: ORTHOPAEDIC SURGERY

## 2020-09-01 PROCEDURE — 1101F PT FALLS ASSESS-DOCD LE1/YR: CPT | Mod: HCNC,CPTII,S$GLB, | Performed by: ORTHOPAEDIC SURGERY

## 2020-09-01 PROCEDURE — 3008F PR BODY MASS INDEX (BMI) DOCUMENTED: ICD-10-PCS | Mod: HCNC,CPTII,S$GLB, | Performed by: NURSE PRACTITIONER

## 2020-09-01 PROCEDURE — 99999 PR PBB SHADOW E&M-EST. PATIENT-LVL V: ICD-10-PCS | Mod: PBBFAC,HCNC,, | Performed by: NURSE PRACTITIONER

## 2020-09-01 PROCEDURE — 1159F PR MEDICATION LIST DOCUMENTED IN MEDICAL RECORD: ICD-10-PCS | Mod: HCNC,S$GLB,, | Performed by: ORTHOPAEDIC SURGERY

## 2020-09-01 PROCEDURE — 1159F PR MEDICATION LIST DOCUMENTED IN MEDICAL RECORD: ICD-10-PCS | Mod: HCNC,S$GLB,, | Performed by: NURSE PRACTITIONER

## 2020-09-01 PROCEDURE — 20550 TENDON SHEATH: R THUMB MCP: ICD-10-PCS | Mod: F5,HCNC,S$GLB, | Performed by: ORTHOPAEDIC SURGERY

## 2020-09-01 PROCEDURE — 3077F SYST BP >= 140 MM HG: CPT | Mod: HCNC,CPTII,S$GLB, | Performed by: ORTHOPAEDIC SURGERY

## 2020-09-01 PROCEDURE — 3078F PR MOST RECENT DIASTOLIC BLOOD PRESSURE < 80 MM HG: ICD-10-PCS | Mod: HCNC,CPTII,S$GLB, | Performed by: NURSE PRACTITIONER

## 2020-09-01 PROCEDURE — 3008F BODY MASS INDEX DOCD: CPT | Mod: HCNC,CPTII,S$GLB, | Performed by: NURSE PRACTITIONER

## 2020-09-01 PROCEDURE — 73140 X-RAY EXAM OF FINGER(S): CPT | Mod: 26,HCNC,RT, | Performed by: RADIOLOGY

## 2020-09-01 PROCEDURE — 99999 PR PBB SHADOW E&M-EST. PATIENT-LVL II: CPT | Mod: PBBFAC,HCNC,, | Performed by: ORTHOPAEDIC SURGERY

## 2020-09-01 PROCEDURE — 1159F MED LIST DOCD IN RCRD: CPT | Mod: HCNC,S$GLB,, | Performed by: ORTHOPAEDIC SURGERY

## 2020-09-01 PROCEDURE — 3078F DIAST BP <80 MM HG: CPT | Mod: HCNC,CPTII,S$GLB, | Performed by: NURSE PRACTITIONER

## 2020-09-01 PROCEDURE — 3078F DIAST BP <80 MM HG: CPT | Mod: HCNC,CPTII,S$GLB, | Performed by: ORTHOPAEDIC SURGERY

## 2020-09-01 PROCEDURE — 99214 PR OFFICE/OUTPT VISIT, EST, LEVL IV, 30-39 MIN: ICD-10-PCS | Mod: HCNC,S$GLB,, | Performed by: NURSE PRACTITIONER

## 2020-09-01 PROCEDURE — 73140 XR FINGER 2 OR MORE VIEWS RIGHT: ICD-10-PCS | Mod: 26,HCNC,RT, | Performed by: RADIOLOGY

## 2020-09-01 PROCEDURE — 3077F PR MOST RECENT SYSTOLIC BLOOD PRESSURE >= 140 MM HG: ICD-10-PCS | Mod: HCNC,CPTII,S$GLB, | Performed by: NURSE PRACTITIONER

## 2020-09-01 PROCEDURE — 3077F PR MOST RECENT SYSTOLIC BLOOD PRESSURE >= 140 MM HG: ICD-10-PCS | Mod: HCNC,CPTII,S$GLB, | Performed by: ORTHOPAEDIC SURGERY

## 2020-09-01 PROCEDURE — 99203 PR OFFICE/OUTPT VISIT, NEW, LEVL III, 30-44 MIN: ICD-10-PCS | Mod: 25,HCNC,S$GLB, | Performed by: ORTHOPAEDIC SURGERY

## 2020-09-01 PROCEDURE — 99214 OFFICE O/P EST MOD 30 MIN: CPT | Mod: HCNC,S$GLB,, | Performed by: NURSE PRACTITIONER

## 2020-09-01 PROCEDURE — 99203 OFFICE O/P NEW LOW 30 MIN: CPT | Mod: 25,HCNC,S$GLB, | Performed by: ORTHOPAEDIC SURGERY

## 2020-09-01 PROCEDURE — 1126F PR PAIN SEVERITY QUANTIFIED, NO PAIN PRESENT: ICD-10-PCS | Mod: HCNC,S$GLB,, | Performed by: NURSE PRACTITIONER

## 2020-09-01 RX ORDER — DEXAMETHASONE SODIUM PHOSPHATE 4 MG/ML
4 INJECTION, SOLUTION INTRA-ARTICULAR; INTRALESIONAL; INTRAMUSCULAR; INTRAVENOUS; SOFT TISSUE
Status: DISCONTINUED | OUTPATIENT
Start: 2020-09-01 | End: 2020-09-01 | Stop reason: HOSPADM

## 2020-09-01 RX ADMIN — DEXAMETHASONE SODIUM PHOSPHATE 4 MG: 4 INJECTION, SOLUTION INTRA-ARTICULAR; INTRALESIONAL; INTRAMUSCULAR; INTRAVENOUS; SOFT TISSUE at 03:09

## 2020-09-01 NOTE — PATIENT INSTRUCTIONS
"Stay hydrated! Repeat labs on Friday.  Report if your top number of BP is always over 140 or always less than 110. Keep lower number between 50-90.  Try "Sit and Get Fit."  Continue low sodium diet. And eat a low potassium diet.  Avoid NSAID (ibuprofen, advil, motrin, aleve, naprosyn, naproxen, [place], Goody's Powder). Tylenol is okay.  Avoid bactrim/ IV contrast/ gadolinium/ aminoglycoside where possible.  Avoid herbal supplements.  Stay hydrated.  Return to clinic in 3 months with labs (bloodwork and urine) or sooner if needed.  Go to the ER with any emergency concerns.      Chronic Kidney Disease (CKD)     The role of the kidneys is to remove waste products and extra water from the blood.  When the kidneys do not work as they should, waste products begin to build up in the blood. This is called chronic kidney disease (CKD). CKD means that you have kidney damage or a decrease in kidney function lasting at least 3 months. CKD allows extra water, waste, and toxins to build up in the body. This can eventually become life-threatening. You might need dialysis or a kidney transplant to stay alive. This most severe form is called end stage renal disease.  Diabetes is the leading causes of chronic renal failure. Other causes include high blood pressure, hardening of the arteries (atherosclerosis), lupus, inflammation of the blood vessels (vasculitis), and past viral or bacterial infections. Certain over-the-counter pain medicines can cause renal failure when taken often over a long period of time. These include aspirin, ibuprofen, and related anti-inflammatory medicines called NSAIDs (nonsteroidal anti-inflammatory drugs).  Home care  The following guidelines will help you care for yourself at home:  · If you have diabetes, talk with your healthcare provider about keeping your blood sugar under control. Ask if you need to make and changes to your diet, lifestyle, or medicines.  · If you have high blood pressure:  ¨ Take " prescribed medicine to lower your blood pressure to the recommended goal of less than 130/80.  ¨ Start a regular exercise program that you enjoy. Check with your healthcare provider to be sure your planned exercise program is right for you.  ¨ Eat less salt (sodium). Your healthcare provider can tell you how much salt per day is safe for you.  · If you are overweight, talk with your healthcare provider about a weight loss plan.  · If you smoke, you must quit. Smoking makes kidney disease worse. Talk with your healthcare provider about ways to help you quit.  For more information, visit the following links:  ¨ www.smokefree.gov/sites/default/files/pdf/clearing-the-air-accessible.pdf  ¨ www.smokefree.gov  ¨ www.cancer.org/healthy/stayawayfromtobacco/guidetoquittingsmoking/  · Most people with CKD need to follow a special diet.  Be sure you understand yours. In general, you will need to limit protein, salt, potassium, and phosphorus. You also need to limit how much fluid you drink.   · CKD is a risk factor for heart disease. Talk with your healthcare provider about any other risk factors you might have and what you can do to lessen them.  · Talk with your healthcare provider about any medicines you are taking to find out if they need to be reduced or stopped.  · Don't use the following over-the-counter medicines, or consult your healthcare provider before using:  ¨ Aspirin and NSAIDs such as ibuprofen or naproxen. Using acetaminophen for fever or pain is OK.  ¨ Laxatives and antacids containing magnesium or aluminum  ¨ Fleet or phospho soda enemas containing phosphorus  ¨ Certain stomach acid-blocking medicine such as cimetidine or ranitidine   ¨ Decongestants containing pseudoephedrine   ¨ Herbal supplements  Follow-up care  Follow up with your healthcare provider, or as advised. Contact one of the following for more information:  · American Association of Kidney Patients 734-772-4270 www.aakp.org  · National Kidney  Foundation 598-814-4713 www.kidney.org  · American Kidney Fund 535-529-1409 www.kidneyfund.org  · National Kidney Disease Education Program 866-4KIDNEY www.nkdep.nih.gov  If an X-ray, ECG (cardiogram), or other diagnostic test was taken, you will be told of any new findings that may affect your care.  Call 911  Call 911 if you have any of the following:  · Severe weakness, dizziness, fainting, drowsiness, or confusion  · Chest pain or shortness of breath  · Heart beating fast, slow, or irregularly  When to seek medical advice  Call your healthcare provider right away if any of these occur:  · Nausea or vomiting  · Fever of 100.4°F (38°C) or higher, or as directed by your healthcare provider  · Unexpected weight gain or swelling in the legs, ankles, or around the eyes  · Decrease or absent urine output  Date Last Reviewed: 9/1/2016  © 3074-2393 [employer]. [address] [place] [address]. All rights reserved. This information is not intended as a substitute for professional medical care. Always follow your healthcare professional's instructions.      Discharge Instructions: Eating a Low-Potassium Diet  Your health care provider has prescribed a low-potassium diet for you. This kind of diet is advised for people who have certain kidney problems. Potassium is needed for muscle function. But too much potassium is a health risk. Potassium is found in many foods. Read below to find out how to change your diet.  Foods to limit  Some foods are high in potassium. Limit your daily intake of the foods in the list below.  · Fruits: apricots (canned and fresh), bananas, cantaloupe, honeydew melon, kiwi, nectarines, pomegranates, oranges, orange juice, pears, dried fruits (apricots, dates, figs, prunes), and prune juice  · Vegetables: asparagus, avocado, artichoke, bamboo shoots, beets, brussels sprouts, cabbage, celery, chard, okra, potatoes (white and sweet), pumpkin, rutabaga, spinach (cooked), squash,  tomato, tomato sauce, tomato juice, and vegetable juice cocktail  · Legumes: black-eyed peas, chickpeas, lentils, lima beans, navy beans, red kidney beans, soybeans, and split peas  · Nuts and seeds: almonds, Brazil nuts, cashews, peanuts, peanut butter, pecans, pumpkin seeds, sunflower seeds, and walnuts  · Breads and cereals: bran and whole-grain products  · Dairy foods: milk, cheese, ice cream, yogurt  · Animal protein: all forms of animal protein  · Other: chocolate, cocoa, coconut milk, and molasses  Tips  · Ask your health care provider how much potassium you are allowed each day. This will help you figure out serving sizes for your needs.  · Check labels for potassium. It may be listed as potassium chloride.  · Do not use salt substitutes. These often have potassium in them.  · Cook frozen fruits and vegetables in water. Rinse and drain them well before eating.  · Drain liquid from all canned fruits and vegetables. Rinse them before eating.  · Reduce the potassium in potatoes. Peel them, slice thinly, and soak in water for at least 4 hours.  · Reduce the potassium in green leafy vegetables. Soak them in water for at least 4 hours.  · Eat white rice and refined white flour products. These include white bread, pasta, and grits.  Follow-up  Make a follow-up appointment as advised by our staff.  When to call your health care provider  Call your health care provider right away if you have any of the following:  · Fatigue  · Shortness of breath  · Chest pain  · Slow, irregular heartbeat  · Fainting  · Dizziness  · Lightheadedness  · Confusion   Date Last Reviewed: 6/21/2015  © 7130-2228 [employer]. [address] [place] [address], [place] [address]. All rights reserved. This information is not intended as a substitute for professional medical care. Always follow your healthcare professional's instructions.

## 2020-09-01 NOTE — PROGRESS NOTES
[facility]  History & Physical  Orthopedics    SUBJECTIVE:      COVID-19 attestation:  This patient was treated during the COVID-19 pandemic.  This was discussed with the patient, they are aware of our current policies and procedures, were given the option of delaying their visit and or switching to a virtual visit, delaying their surgery when applicable, and they elect to proceed.    Chief Complaint: right thumb triggering    Referring Provider: Miguelina Weathers MD     History of Present Illness:  Patient is a 75 y.o. right hand dominant male who presents today with complaints of right thumb triggering. Starting 3 months ago and has gotten worse. Pain over volar A1 pulley of thumb without radiation. Denies any pain in IPJ of thumb. Mechanical symptoms that require manual unlocking. No previous injuries or surgeries to RUE. Denies any numbness/tingling. Has not tried any treatments other than tylenol for his leg pain that helps mildly.     The patient is a/an retiree.    Onset of symptoms/DOI was 3 months.    Symptoms are aggravated by activity and movement.    Symptoms are alleviated by rest.    Symptoms consist of pain and decreased ROM.    The patient rates their pain as a 5/10.    Attempted treatment(s) and/or interventions include rest.     The patient denies any fevers, chills, N/V, D/C and presents for evaluation.       Past Medical History:   Diagnosis Date    NAT (acute kidney injury) 3/19/2017    ALLERGIC RHINITIS     Anemia     Anxiety     Basal cell carcinoma 10/19/2018    forehead and right medial shoulder    Basal cell carcinoma 01/09/2019    left nasal bridge and left posterior ear    Chronic rhinitis 5/3/2013    Chronic rhinitis 5/3/2013    Coronary artery disease involving native coronary artery of native heart without angina pectoris s/p RCA stent     Cortical cataract of both eyes 3/18/2016    Delayed sleep phase syndrome 3/13/2019    Depression     Erectile  "dysfunction 3/24/2014    Erectile dysfunction 3/24/2014    Essential hypertension     GERD (gastroesophageal reflux disease) 7/25/2012    Gout, arthritis     Grade III open fracture of left tibia and fibula s/p ex-fix on 7/1/16 and ORIF of left tibia on 7/15 7/6/2016    H/O: iritis     Helicobacter pylori (H. pylori) infection     Treated    Herpes simplex keratoconjunctivitis 9/30/2015    - on acyclovir - followed by opthalmology, Dr. Uribe     Herpes simplex keratoconjunctivitis 9/30/2015    - on acyclovir - followed by opthalmology, Dr. Uribe     Hyperkalemia 2/28/2017    Hyperlipidemia     Hypogonadism male     Hypogonadism male     Mixed anxiety and depressive disorder     Morbid obesity     Obstructive sleep apnea on CPAP     CPAP    Osteoarthritis of left knee 7/25/2012    Paroxysmal atrial fibrillation 7/6/2016    Primary osteoarthritis of left knee 7/25/2012    Prominent aorta 1/25/2016    "RESULTS: THE HEART IS MILDLY ENLARGED WITH A SLIGHTLY PROMINENT AORTA" - Xray Chest PA & Lateral 12-     Prostate cancer 2/15/2016    - followed by urology, Dr. Young     Prostate cancer 2/15/2016    - followed by urology, Dr. 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 Conteh    CATARACT EXTRACTION W/  INTRAOCULAR LENS IMPLANT Left 4/12/2016        COLONOSCOPY N/A 7/23/2020    Procedure: COLONOSCOPY;  " "Surgeon: Nico Lackey MD;  Location: [facility] (2ND FLR);  Service: Endoscopy;  Laterality: N/A;    ESOPHAGOGASTRODUODENOSCOPY N/A 7/23/2020    Procedure: EGD (ESOPHAGOGASTRODUODENOSCOPY);  Surgeon: Nico Lackey MD;  Location: [facility] (2ND FLR);  Service: Endoscopy;  Laterality: N/A;  per Dr Lackey-Will proceed with EGD and colonoscopy on the 2nd floor due to his comorbidities including obesity, sleep apnea, restrictive lung disease, coronary artery disease.  has loop recorder      ok to hold Eliquis 2 days per Dr Sandhu-must remain    EXTERNAL FIXATION TIBIAL FRACTURE Left 07/01/2016    INSERTION OF IMPLANTABLE LOOP RECORDER  04/07/2017    LEFT HEART CATHETERIZATION Left 1/30/2020    Procedure: Left heart cath;  Surgeon: Benjamin Sandhu MD;  Location: [facility] CATH LAB;  Service: Cardiology;  Laterality: Left;  RN PREOP 1/28/20--Pt starting Plavix loading dose today (8pills)- Dr. Sandhu aware.  Pt has a bandaged "non healing area to LLE"--Dr. Sandhu aware.    LEFT HEART CATHETERIZATION Left 2/14/2020    Procedure: Left heart cath, IVUS guided left main / LAD PCI. Noon start, radial approach;  Surgeon: Miguel Angel Brady MD;  Location: [facility] CATH LAB;  Service: Cardiology;  Laterality: Left;  RN PRE OP 2-7-2020  BMI--45.61    ORIF TIBIA FRACTURE Left 07/15/2016    RADIOACTIVE SEED IMPLANTATION OF PROSTATE N/A 8/8/2018    Procedure: INSERTION, RADIOACTIVE SEED, PROSTATE;  Surgeon: Bipin Thompson MD;  Location: [facility] OR 1ST FLR;  Service: Urology;  Laterality: N/A;  1 hour    Squamous cell cancer removal x3 with Mohs surgery      TONSILLECTOMY      TOTAL KNEE ARTHROPLASTY  10/2012    trus/bx      ULTRASOUND GUIDANCE  2/14/2020    Procedure: Ultrasound Guidance;  Surgeon: Miguel Angel Brady MD;  Location: [facility] CATH LAB;  Service: Cardiology;;     Review of patient's allergies indicates:   Allergen Reactions    Ciprofloxacin Rash     Diffuse pruritic morbilliform rash developed 3/15/2017 after dose of " cipro; previously in 2/2017 he had rash/fevers after initiation of cipro    Zosyn [piperacillin-tazobactam] Rash     Diffuse pruritic morbilliform rash developed 3/15/2017.  Then, 430am dose on 3/16 and rash worsened with SOB/tachypnea but no hypoxemia.     Bacitracin Itching and Rash     Violaceous rash in area of topical Tx.      Social History     Social History Narrative    Not on file     Family History   Problem Relation Age of Onset    Skin cancer Father     Lung cancer Father     Cancer Father         smoker,     Alzheimer's disease Mother     Hypertension Mother     Cancer Sister         colon, lung cancer     No Known Problems Sister     Cancer Brother         skin cancer, polypectomy     Peripheral vascular disease Unknown     No Known Problems Maternal Aunt     No Known Problems Maternal Uncle     No Known Problems Paternal Aunt     No Known Problems Paternal Uncle     No Known Problems Maternal Grandmother     No Known Problems Maternal Grandfather     No Known Problems Paternal Grandmother     No Known Problems Paternal Grandfather     Melanoma Neg Hx     Psoriasis Neg Hx     Lupus Neg Hx     Eczema Neg Hx     Amblyopia Neg Hx     Blindness Neg Hx     Cataracts Neg Hx     Diabetes Neg Hx     Glaucoma Neg Hx     Macular degeneration Neg Hx     Retinal detachment Neg Hx     Strabismus Neg Hx     Stroke Neg Hx     Thyroid disease Neg Hx     Acne Neg Hx          Current Outpatient Medications:     acyclovir (ZOVIRAX) 800 MG Tab, Take 1 tablet (800 mg total) by mouth 2 (two) times daily., Disp: 180 tablet, Rfl: 6    albuterol (PROVENTIL/VENTOLIN HFA) 90 mcg/actuation inhaler, Inhale 2 puffs into the lungs every 6 (six) hours as needed for Wheezing. Rescue, Disp: 18 g, Rfl: 0    apixaban (ELIQUIS) 5 mg Tab, Take 1 tablet (5 mg total) by mouth 2 (two) times daily., Disp: 180 tablet, Rfl: 4    artificial tears (ISOPTO TEARS) 0.5 % ophthalmic solution, Place 1 drop into  both eyes as needed. (Patient taking differently: Place 1 drop into both eyes as needed (for dry eyes). ), Disp: , Rfl:     atorvastatin (LIPITOR) 40 MG tablet, Take 1 tablet by mouth once daily, Disp: 90 tablet, Rfl: 0    clopidogreL (PLAVIX) 75 mg tablet, Take 1 tablet (75 mg total) by mouth once daily., Disp: 90 tablet, Rfl: 3    CYANOCOBALAMIN, VITAMIN B-12, (VITAMIN B-12 ORAL), Take 2,500 mcg by mouth once daily., Disp: , Rfl:     fluorouraciL (EFUDEX) 5 % cream, AAA left lower medial leg bid x 4 weeks, Disp: 40 g, Rfl: 1    fluticasone propionate (FLONASE) 50 mcg/actuation nasal spray, 1 spray (50 mcg total) by Each Nostril route once daily. (Patient taking differently: 1 spray by Each Nostril route once daily. As needed), Disp: 1 Bottle, Rfl: 0    gabapentin (NEURONTIN) 300 MG capsule, Take 1 capsule (300 mg total) by mouth 2 (two) times daily. (Patient taking differently: Take 300 mg by mouth 3 (three) times daily. ), Disp: 60 capsule, Rfl: 11    melatonin 5 mg Tab, Take 10 mg by mouth nightly., Disp: , Rfl:     metoprolol succinate (TOPROL-XL) 25 MG 24 hr tablet, Take 25 mg by mouth once daily. , Disp: , Rfl:     multivitamin (MULTIPLE VITAMINS) per tablet, Take 1 tablet by mouth once daily., Disp: , Rfl:     nitroGLYCERIN (NITROSTAT) 0.4 MG SL tablet, Place 1 tablet (0.4 mg total) under the tongue every 5 (five) minutes as needed for Chest pain., Disp: 25 tablet, Rfl: 11    omeprazole (PRILOSEC) 20 MG capsule, Take 1 capsule (20 mg total) by mouth daily as needed., Disp: 90 capsule, Rfl: 0    oxybutynin (DITROPAN-XL) 5 MG TR24, Take 1 tablet (5 mg total) by mouth once daily., Disp: 30 tablet, Rfl: 11    tamsulosin (FLOMAX) 0.4 mg Cap, Take 1 capsule (0.4 mg total) by mouth once daily., Disp: 90 capsule, Rfl: 3    triamcinolone acetonide 0.1% (KENALOG) 0.1 % cream, Apply topically 2 (two) times daily. Apply to affected area twice daily as directed., Disp: 453.6 g, Rfl: 0    venlafaxine  (EFFEXOR) 75 MG tablet, Take 2 tablets by mouth twice daily, Disp: 360 tablet, Rfl: 0    vitamin D 1000 units Tab, Take 1 tablet (1,000 Units total) by mouth once daily., Disp: , Rfl:       Review of Systems:  Constitutional: no fever or chills  Eyes: no visual changes  ENT: no nasal congestion or sore throat  Respiratory: no cough or shortness of breath  Cardiovascular: no chest pain  Gastrointestinal: no nausea or vomiting, tolerating diet  Musculoskeletal: arthralgias and decreased ROM    OBJECTIVE:      Vital Signs (Most Recent):  There were no vitals filed for this visit.  There is no height or weight on file to calculate BMI.      Physical Exam:  Constitutional: The patient appears well-developed and well-nourished. No distress.   Head: Normocephalic and atraumatic.   Nose: Nose normal.   Eyes: Conjunctivae and EOM are normal.   Neck: No tracheal deviation present.   Cardiovascular: Normal rate and intact distal pulses.    Pulmonary/Chest: Effort normal. No respiratory distress.   Abdominal: There is no guarding.   Neurological: The patient is alert.   Psychiatric: The patient has a normal mood and affect.     Right Hand/Wrist Examination:    Observation/Inspection:  Swelling  none    Deformity  none  Discoloration  none     Scars   none    Atrophy  none    HAND/WRIST EXAMINATION:  Finkelstein's Test   Neg  WHAT Test    Neg  Snuff box tenderness   Neg  Angela's Test    Neg  Hook of Hamate Tenderness  Neg  CMC grind    Neg  Circumduction test   Neg    Neurovascular Exam:  Digits WWP, brisk CR < 3s throughout  NVI motor/LTS to M/R/U nerves, radial pulse 2+  Tinel's Test - Carpal Tunnel  Neg  Tinel's Test - Cubital Tunnel  Neg  Phalen's Test    Neg  Median Nerve Compression Test Neg    Patient able to make a full composite fist without extensor lag. Palpable tendon nodule just proximal to A1 pulley of thumb. Mechanical locking can be elicited on exam that is painful and requires manual unlocking.     ROM  hand/wrist/elbow full, painless    RRR  CTAB  Abd S/NT/ND +BS    Diagnostic Results:     Xray - Right hand with CMC and IPJ of thumb arthritis    ASSESSMENT/PLAN:      Janusz Montgomery Jr. is a 75 y.o. male with right trigger thumb. Treatment options were discussed. Patient elected to undergo A1 pulley CSI during this visit which was performed for the patient.  Plan:   1) R thumb A1 pulley CSI  2) Continue taking NSAIDs and antiinflammatory medications  2) RTC PRN        Rad Swift M.D.     Please be aware that this note has been generated with the assistance of MModal voice-to-text.  Please excuse any spelling or grammatical errors.

## 2020-09-01 NOTE — LETTER
September 1, 2020      Miguelina Weathers MD  [address]  [facility] [address]           [facility] 5th Fl  [address]  [facility] [address]  Phone: [phone]          Patient: Janusz Montgomery Jr.   MR Number: [id number]   YOB: 1945   Date of Visit: 9/1/2020       Dear Dr. Miguelina Weathers:    Thank you for referring Janusz Montgomery to me for evaluation. Attached you will find relevant portions of my assessment and plan of care.    If you have questions, please do not hesitate to call me. I look forward to following Janusz Montgomery along with you.    Sincerely,    Rad Swift MD    Enclosure  CC:  No Recipients    If you would like to receive this communication electronically, please contact [email or web address] or [phone] to request more information on [employer] access.    For providers and/or their staff who would like to refer a patient to [facility], please contact us through our one-stop-shop provider referral line, [facility], at [phone].    If you feel you have received this communication in error or would no longer like to receive these types of communications, please e-mail [email or web address]

## 2020-09-01 NOTE — PROCEDURES
Tendon Sheath: R thumb MCP    Date/Time: 9/1/2020 3:00 PM  Performed by: Rad Swift MD  Authorized by: Rad Swift MD     Consent Done?:  Yes (Verbal)  Indications:  Pain  Site marked: the procedure site was marked    Timeout: prior to procedure the correct patient, procedure, and site was verified    Prep: patient was prepped and draped in usual sterile fashion      Local anesthesia used?: Yes    Local anesthetic: Topical spray prior to injection and 1cc 1% plain lidocaine in the injectate.  Location:  Thumb  Site:  R thumb MCP  Ultrasonic guidance for needle placement?: No    Needle size:  25 G  Approach:  Volar  Medications:  4 mg dexamethasone 4 mg/mL  Patient tolerance:  Patient tolerated the procedure well with no immediate complications

## 2020-09-02 NOTE — PROGRESS NOTES
"Reviewed chart. Per 9/1/20 nephrology note:  "Stay hydrated! Repeat labs on Friday.  Report if your top number of BP is always over 140 or always less than 110. Keep lower number between 50-90.    Patient went to ER after clinic appt with me last week to be evaluated for symptomatic hypotension. He was discharged 8/27/20. He was found to have an NAT c sCr of 2.9 mg/dL and admitted. NAT attributed to ATN from hypotension. sCr at discharge was 2.3 mg/dL. Lasix and olmesartan d/c at discharge    Goal is SBP < 140."    Updated BP goal to < 140/90 mmHg given above  Continue monitoring given recent NAT and medication changes  "

## 2020-09-03 ENCOUNTER — OFFICE VISIT (OUTPATIENT)
Dept: WOUND CARE | Facility: CLINIC | Age: 75
End: 2020-09-03
Payer: MEDICARE

## 2020-09-03 VITALS
DIASTOLIC BLOOD PRESSURE: 74 MMHG | WEIGHT: 315 LBS | HEIGHT: 73 IN | BODY MASS INDEX: 41.75 KG/M2 | TEMPERATURE: 97 F | HEART RATE: 102 BPM | SYSTOLIC BLOOD PRESSURE: 139 MMHG

## 2020-09-03 DIAGNOSIS — I83.899 VARICOSE VEINS OF LEG WITH COMPLICATIONS: ICD-10-CM

## 2020-09-03 DIAGNOSIS — I87.2 VENOUS STASIS DERMATITIS OF BOTH LOWER EXTREMITIES: ICD-10-CM

## 2020-09-03 DIAGNOSIS — T81.89XA DELAYED SURGICAL WOUND HEALING, INITIAL ENCOUNTER: Primary | ICD-10-CM

## 2020-09-03 PROCEDURE — 99499 NO LOS: ICD-10-PCS | Mod: HCNC,S$GLB,, | Performed by: NURSE PRACTITIONER

## 2020-09-03 PROCEDURE — 29580 PR STRAPPING UNNA BOOT: ICD-10-PCS | Mod: HCNC,LT,S$GLB, | Performed by: NURSE PRACTITIONER

## 2020-09-03 PROCEDURE — 29580 STRAPPING UNNA BOOT: CPT | Mod: HCNC,LT,S$GLB, | Performed by: NURSE PRACTITIONER

## 2020-09-03 PROCEDURE — 99999 PR PBB SHADOW E&M-EST. PATIENT-LVL V: ICD-10-PCS | Mod: PBBFAC,HCNC,, | Performed by: NURSE PRACTITIONER

## 2020-09-03 PROCEDURE — 99999 PR PBB SHADOW E&M-EST. PATIENT-LVL V: CPT | Mod: PBBFAC,HCNC,, | Performed by: NURSE PRACTITIONER

## 2020-09-03 PROCEDURE — 99499 UNLISTED E&M SERVICE: CPT | Mod: HCNC,S$GLB,, | Performed by: NURSE PRACTITIONER

## 2020-09-03 NOTE — PROGRESS NOTES
"Subjective:       Patient ID: Janusz Montgomery Jr. is a 75 y.o. male.    Chief Complaint: Wound Check    Wound Check     75 y.o. male referred by Dr. Kumar for evaluation and management of an open wound to the left lower leg.  He was seen by Dr. Kumar on 6/12/20 at which time he presented for evaluation of his wound which had been present for the past year. He requested and underwent a biopsy of the leg wound on that date.  The biopsy was positive for BCC.  He used efudex for about a month.  He was seen at [facility] wound care on 6/17/20 and kirsten and a coflex compression wrap was applied to the leg.  The wound is healing as evidenced by increased granulation and wound contracture. He has neuropathy and is on gabapentin.  He is afebrile. He denies increased redness, swelling or purulent drainage.  He does not complain of pain. He was in hospital last week for his kidneys and prescribed Santyl to wound, but wound care was not done during his stay, "only applied bandage over wound, did not apply Santyl."     Patient Active Problem List   Diagnosis    Essential hypertension    Other hyperlipidemia    Coronary artery disease involving native coronary artery of native heart without angina pectoris s/p RCA stent    Obstructive sleep apnea    GERD (gastroesophageal reflux disease)    Benign prostatic hyperplasia with urinary obstruction    Morbid obesity with body mass index of 45.0-49.9 in adult    Prostate cancer    Paroxysmal atrial fibrillation     Major depressive disorder, recurrent episode, mild    Generalized anxiety disorder    History of gout    Herpes simplex keratoconjunctivitis    Decreased range of motion of left ankle    Right leg weakness    Impaired mobility    Balance problems    PVD (peripheral vascular disease)    Bilateral leg edema    Bilateral carotid artery stenosis    Gait abnormality    Left leg weakness    Delayed sleep phase syndrome    Floppy eyelid " "syndrome    Dyspnea    Insomnia    Restrictive lung disease    Personal history of asbestosis    CAD (coronary artery disease)    Varicose veins of leg with complications    Delayed surgical wound healing    Venous stasis dermatitis of both lower extremities    Acute kidney injury superimposed on chronic kidney disease    CKD (chronic kidney disease) stage 3, GFR 30-59 ml/min    Hypotension     Past Medical History:   Diagnosis Date    NAT (acute kidney injury) 3/19/2017    ALLERGIC RHINITIS     Anemia     Anxiety     Basal cell carcinoma 10/19/2018    forehead and right medial shoulder    Basal cell carcinoma 01/09/2019    left nasal bridge and left posterior ear    Chronic rhinitis 5/3/2013    Chronic rhinitis 5/3/2013    Coronary artery disease involving native coronary artery of native heart without angina pectoris s/p RCA stent     Cortical cataract of both eyes 3/18/2016    Delayed sleep phase syndrome 3/13/2019    Depression     Erectile dysfunction 3/24/2014    Erectile dysfunction 3/24/2014    Essential hypertension     GERD (gastroesophageal reflux disease) 7/25/2012    Gout, arthritis     Grade III open fracture of left tibia and fibula s/p ex-fix on 7/1/16 and ORIF of left tibia on 7/15 7/6/2016    H/O: iritis     Helicobacter pylori (H. pylori) infection     Treated    Herpes simplex keratoconjunctivitis 9/30/2015    - on acyclovir - followed by opthalmology, Dr. Uribe     Herpes simplex keratoconjunctivitis 9/30/2015    - on acyclovir - followed by opthalmology, Dr. Uribe     Hyperkalemia 2/28/2017    Hyperlipidemia     Hypogonadism male     Hypogonadism male     Mixed anxiety and depressive disorder     Morbid obesity     Obstructive sleep apnea on CPAP     CPAP    Osteoarthritis of left knee 7/25/2012    Paroxysmal atrial fibrillation 7/6/2016    Primary osteoarthritis of left knee 7/25/2012    Prominent aorta 1/25/2016    "RESULTS: THE HEART IS MILDLY " "ENLARGED WITH A SLIGHTLY PROMINENT AORTA" - Xray Chest PA & Lateral 12-     Prostate cancer 2/15/2016    - followed by urology, Dr. Young     Prostate cancer 2/15/2016    - followed by urology, Dr. 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 Conteh    CATARACT EXTRACTION W/  INTRAOCULAR LENS IMPLANT Left 4/12/2016        COLONOSCOPY N/A 7/23/2020    Procedure: COLONOSCOPY;  Surgeon: Nico Lackey MD;  Location: [facility] ([address] [place] [address]);  Service: Endoscopy;  Laterality: N/A;    ESOPHAGOGASTRODUODENOSCOPY N/A 7/23/2020    Procedure: EGD (ESOPHAGOGASTRODUODENOSCOPY);  Surgeon: Nico Lackey MD;  Location: [facility] ([address] [place] [address]);  Service: Endoscopy;  Laterality: N/A;  per Dr Lackey-Will proceed with EGD and colonoscopy on the 2nd floor due to his comorbidities including obesity, sleep apnea, restrictive lung disease, coronary artery disease.  has loop recorder      ok to hold Eliquis 2 days per Dr Sandhu-must remain    EXTERNAL FIXATION TIBIAL FRACTURE Left 07/01/2016    INSERTION OF IMPLANTABLE LOOP RECORDER  04/07/2017    LEFT HEART CATHETERIZATION Left 1/30/2020    Procedure: Left heart cath;  Surgeon: Benjamin Sandhu MD;  Location: [facility] CATH LAB;  Service: Cardiology;  Laterality: Left;  RN PREOP 1/28/20--Pt starting Plavix loading dose today (8pills)- Dr. Sandhu aware.  Pt has a bandaged "non healing area to " "RICKY"--Dr. Sandhu aware.    LEFT HEART CATHETERIZATION Left 2/14/2020    Procedure: Left heart cath, IVUS guided left main / LAD PCI. Noon start, radial approach;  Surgeon: Miguel Angel Brady MD;  Location: [facility] CATH LAB;  Service: Cardiology;  Laterality: Left;  RN PRE OP 2-7-2020  BMI--45.61    ORIF TIBIA FRACTURE Left 07/15/2016    RADIOACTIVE SEED IMPLANTATION OF PROSTATE N/A 8/8/2018    Procedure: INSERTION, RADIOACTIVE SEED, PROSTATE;  Surgeon: Bipin Thompson MD;  Location: [facility] OR [address] [place] [address];  Service: Urology;  Laterality: N/A;  1 hour    Squamous cell cancer removal x3 with Mohs surgery      TONSILLECTOMY      TOTAL KNEE ARTHROPLASTY  10/2012    trus/bx      ULTRASOUND GUIDANCE  2/14/2020    Procedure: Ultrasound Guidance;  Surgeon: Miguel Angel Brady MD;  Location: [facility] CATH LAB;  Service: Cardiology;;     Review of Systems      Unchanged from previous visit.  Objective:      Physical Exam  Vitals signs and nursing note reviewed.   Constitutional:       General: He is not in acute distress.     Appearance: He is well-developed. He is not diaphoretic.   HENT:      Head: Normocephalic and atraumatic.   Pulmonary:      Effort: Pulmonary effort is normal. No respiratory distress.   Musculoskeletal: Normal range of motion.         General: No tenderness.        Legs:    Skin:     General: Skin is warm and dry.      Findings: No erythema or rash.   Neurological:      Mental Status: He is alert and oriented to person, place, and time.   Psychiatric:         Behavior: Behavior normal.         Thought Content: Thought content normal.         Judgment: Judgment normal.                     Janusz was seen in the clinic room and placed in the supine position on the treatment table.  The left leg was cleansed with Easi-clense sponges and dried thoroughly.  Eucerin cream was applied to the lower legs. Calmoseptine was applied to the periwound area. Santyl and a hydrofiber dressing was applied to the wound and covered with " an ABD pad.  The patient's foot was positioned at a 90 degree angle.  The coflex with calamine was applied per package instructions.  A two layered application was performed using a spiral technique beginning with the foam layer followed by the cohesive bandage avoiding creases or folds.  The wrap was started behind the first metatarsal and ended below the tibial tubercle of the knee.  There was overlap of each turn half the width of the previous turn.  The compression wrap will be changed every 7 days.    Assessment:       1. Delayed surgical wound healing, initial encounter    2. Varicose veins of leg with complications    3. Venous stasis dermatitis of both lower extremities               Plan:            Triamncinolone cream to affected area on legs.  Coflex compression wrap with calamine left lower leg as detailed above.  Patient was warned not to get the dressings wet and to use cast covers for showering.  Should the dressing become wet, he is to remove it, place a wet-to-dry dressing over the wound, cover with gauze and roll gauze and use ace wraps for compression and to secure bandages.  He should then notify this office as soon as possible to have a new dressing applied.  Return to clinic in one week.

## 2020-09-04 ENCOUNTER — OFFICE VISIT (OUTPATIENT)
Dept: CARDIOLOGY | Facility: CLINIC | Age: 75
End: 2020-09-04
Payer: MEDICARE

## 2020-09-04 ENCOUNTER — LAB VISIT (OUTPATIENT)
Dept: LAB | Facility: HOSPITAL | Age: 75
End: 2020-09-04
Attending: INTERNAL MEDICINE
Payer: MEDICARE

## 2020-09-04 VITALS
SYSTOLIC BLOOD PRESSURE: 98 MMHG | RESPIRATION RATE: 97 BRPM | WEIGHT: 315 LBS | BODY MASS INDEX: 41.75 KG/M2 | HEART RATE: 74 BPM | HEIGHT: 73 IN | DIASTOLIC BLOOD PRESSURE: 72 MMHG

## 2020-09-04 DIAGNOSIS — E78.49 OTHER HYPERLIPIDEMIA: ICD-10-CM

## 2020-09-04 DIAGNOSIS — I25.10 CORONARY ARTERY DISEASE INVOLVING NATIVE CORONARY ARTERY OF NATIVE HEART WITHOUT ANGINA PECTORIS: ICD-10-CM

## 2020-09-04 DIAGNOSIS — N18.30 CKD (CHRONIC KIDNEY DISEASE) STAGE 3, GFR 30-59 ML/MIN: ICD-10-CM

## 2020-09-04 DIAGNOSIS — I73.9 PVD (PERIPHERAL VASCULAR DISEASE): ICD-10-CM

## 2020-09-04 DIAGNOSIS — I10 ESSENTIAL HYPERTENSION: ICD-10-CM

## 2020-09-04 DIAGNOSIS — I65.23 BILATERAL CAROTID ARTERY STENOSIS: ICD-10-CM

## 2020-09-04 DIAGNOSIS — I48.0 PAROXYSMAL ATRIAL FIBRILLATION: ICD-10-CM

## 2020-09-04 DIAGNOSIS — J98.4 RESTRICTIVE LUNG DISEASE: Primary | ICD-10-CM

## 2020-09-04 LAB
ANION GAP SERPL CALC-SCNC: 6 MMOL/L (ref 8–16)
BUN SERPL-MCNC: 37 MG/DL (ref 8–23)
CALCIUM SERPL-MCNC: 9 MG/DL (ref 8.7–10.5)
CHLORIDE SERPL-SCNC: 113 MMOL/L (ref 95–110)
CO2 SERPL-SCNC: 23 MMOL/L (ref 23–29)
CREAT SERPL-MCNC: 1.9 MG/DL (ref 0.5–1.4)
EST. GFR  (AFRICAN AMERICAN): 39.6 ML/MIN/1.73 M^2
EST. GFR  (NON AFRICAN AMERICAN): 34.2 ML/MIN/1.73 M^2
GLUCOSE SERPL-MCNC: 100 MG/DL (ref 70–110)
POTASSIUM SERPL-SCNC: 5.2 MMOL/L (ref 3.5–5.1)
SODIUM SERPL-SCNC: 142 MMOL/L (ref 136–145)

## 2020-09-04 PROCEDURE — 1159F MED LIST DOCD IN RCRD: CPT | Mod: HCNC,S$GLB,, | Performed by: INTERNAL MEDICINE

## 2020-09-04 PROCEDURE — 99499 UNLISTED E&M SERVICE: CPT | Mod: HCNC,S$GLB,, | Performed by: INTERNAL MEDICINE

## 2020-09-04 PROCEDURE — 3074F SYST BP LT 130 MM HG: CPT | Mod: HCNC,CPTII,S$GLB, | Performed by: INTERNAL MEDICINE

## 2020-09-04 PROCEDURE — 3074F PR MOST RECENT SYSTOLIC BLOOD PRESSURE < 130 MM HG: ICD-10-PCS | Mod: HCNC,CPTII,S$GLB, | Performed by: INTERNAL MEDICINE

## 2020-09-04 PROCEDURE — 99999 PR PBB SHADOW E&M-EST. PATIENT-LVL V: CPT | Mod: PBBFAC,HCNC,, | Performed by: INTERNAL MEDICINE

## 2020-09-04 PROCEDURE — 1159F PR MEDICATION LIST DOCUMENTED IN MEDICAL RECORD: ICD-10-PCS | Mod: HCNC,S$GLB,, | Performed by: INTERNAL MEDICINE

## 2020-09-04 PROCEDURE — 3008F BODY MASS INDEX DOCD: CPT | Mod: HCNC,CPTII,S$GLB, | Performed by: INTERNAL MEDICINE

## 2020-09-04 PROCEDURE — 99214 PR OFFICE/OUTPT VISIT, EST, LEVL IV, 30-39 MIN: ICD-10-PCS | Mod: HCNC,S$GLB,, | Performed by: INTERNAL MEDICINE

## 2020-09-04 PROCEDURE — 99999 PR PBB SHADOW E&M-EST. PATIENT-LVL V: ICD-10-PCS | Mod: PBBFAC,HCNC,, | Performed by: INTERNAL MEDICINE

## 2020-09-04 PROCEDURE — 1126F AMNT PAIN NOTED NONE PRSNT: CPT | Mod: HCNC,S$GLB,, | Performed by: INTERNAL MEDICINE

## 2020-09-04 PROCEDURE — 3078F PR MOST RECENT DIASTOLIC BLOOD PRESSURE < 80 MM HG: ICD-10-PCS | Mod: HCNC,CPTII,S$GLB, | Performed by: INTERNAL MEDICINE

## 2020-09-04 PROCEDURE — 1126F PR PAIN SEVERITY QUANTIFIED, NO PAIN PRESENT: ICD-10-PCS | Mod: HCNC,S$GLB,, | Performed by: INTERNAL MEDICINE

## 2020-09-04 PROCEDURE — 1101F PR PT FALLS ASSESS DOC 0-1 FALLS W/OUT INJ PAST YR: ICD-10-PCS | Mod: HCNC,CPTII,S$GLB, | Performed by: INTERNAL MEDICINE

## 2020-09-04 PROCEDURE — 99214 OFFICE O/P EST MOD 30 MIN: CPT | Mod: HCNC,S$GLB,, | Performed by: INTERNAL MEDICINE

## 2020-09-04 PROCEDURE — 99499 RISK ADDL DX/OHS AUDIT: ICD-10-PCS | Mod: HCNC,S$GLB,, | Performed by: INTERNAL MEDICINE

## 2020-09-04 PROCEDURE — 3008F PR BODY MASS INDEX (BMI) DOCUMENTED: ICD-10-PCS | Mod: HCNC,CPTII,S$GLB, | Performed by: INTERNAL MEDICINE

## 2020-09-04 PROCEDURE — 1101F PT FALLS ASSESS-DOCD LE1/YR: CPT | Mod: HCNC,CPTII,S$GLB, | Performed by: INTERNAL MEDICINE

## 2020-09-04 PROCEDURE — 80048 BASIC METABOLIC PNL TOTAL CA: CPT | Mod: HCNC

## 2020-09-04 PROCEDURE — 36415 COLL VENOUS BLD VENIPUNCTURE: CPT | Mod: HCNC,PO

## 2020-09-04 PROCEDURE — 3078F DIAST BP <80 MM HG: CPT | Mod: HCNC,CPTII,S$GLB, | Performed by: INTERNAL MEDICINE

## 2020-09-05 ENCOUNTER — CLINICAL SUPPORT (OUTPATIENT)
Dept: CARDIOLOGY | Facility: HOSPITAL | Age: 75
End: 2020-09-05
Payer: MEDICARE

## 2020-09-05 DIAGNOSIS — Z95.818 PRESENCE OF OTHER CARDIAC IMPLANTS AND GRAFTS: ICD-10-CM

## 2020-09-05 PROCEDURE — 93298 CARDIAC DEVICE CHECK - REMOTE: ICD-10-PCS | Mod: HCNC,,, | Performed by: INTERNAL MEDICINE

## 2020-09-05 PROCEDURE — 93298 REM INTERROG DEV EVAL SCRMS: CPT | Mod: HCNC,,, | Performed by: INTERNAL MEDICINE

## 2020-09-05 PROCEDURE — G2066 INTER DEVC REMOTE 30D: HCPCS | Mod: HCNC | Performed by: INTERNAL MEDICINE

## 2020-09-06 ENCOUNTER — PATIENT OUTREACH (OUTPATIENT)
Dept: ADMINISTRATIVE | Facility: OTHER | Age: 75
End: 2020-09-06

## 2020-09-07 DIAGNOSIS — F33.0 MAJOR DEPRESSIVE DISORDER, RECURRENT EPISODE, MILD: ICD-10-CM

## 2020-09-07 DIAGNOSIS — E78.5 HYPERLIPIDEMIA, UNSPECIFIED HYPERLIPIDEMIA TYPE: ICD-10-CM

## 2020-09-08 ENCOUNTER — PATIENT MESSAGE (OUTPATIENT)
Dept: NEPHROLOGY | Facility: CLINIC | Age: 75
End: 2020-09-08

## 2020-09-08 DIAGNOSIS — R09.89 LABILE BLOOD PRESSURE: Primary | ICD-10-CM

## 2020-09-08 DIAGNOSIS — N17.9 AKI (ACUTE KIDNEY INJURY): ICD-10-CM

## 2020-09-08 RX ORDER — ATORVASTATIN CALCIUM 40 MG/1
40 TABLET, FILM COATED ORAL DAILY
Qty: 90 TABLET | Refills: 1 | Status: SHIPPED | OUTPATIENT
Start: 2020-09-08 | End: 2021-03-28 | Stop reason: SDUPTHER

## 2020-09-09 ENCOUNTER — OFFICE VISIT (OUTPATIENT)
Dept: WOUND CARE | Facility: CLINIC | Age: 75
End: 2020-09-09
Payer: MEDICARE

## 2020-09-09 VITALS
WEIGHT: 315 LBS | BODY MASS INDEX: 41.75 KG/M2 | DIASTOLIC BLOOD PRESSURE: 61 MMHG | SYSTOLIC BLOOD PRESSURE: 130 MMHG | HEART RATE: 69 BPM | TEMPERATURE: 98 F | HEIGHT: 73 IN

## 2020-09-09 DIAGNOSIS — T81.89XA DELAYED SURGICAL WOUND HEALING, INITIAL ENCOUNTER: Primary | ICD-10-CM

## 2020-09-09 DIAGNOSIS — I83.899 VARICOSE VEINS OF LEG WITH COMPLICATIONS: ICD-10-CM

## 2020-09-09 DIAGNOSIS — I48.0 PAROXYSMAL ATRIAL FIBRILLATION: ICD-10-CM

## 2020-09-09 DIAGNOSIS — I49.8 OTHER SPECIFIED CARDIAC ARRHYTHMIAS: Primary | ICD-10-CM

## 2020-09-09 PROCEDURE — 29580 PR STRAPPING UNNA BOOT: ICD-10-PCS | Mod: HCNC,LT,S$GLB, | Performed by: NURSE PRACTITIONER

## 2020-09-09 PROCEDURE — 29580 STRAPPING UNNA BOOT: CPT | Mod: HCNC,LT,S$GLB, | Performed by: NURSE PRACTITIONER

## 2020-09-09 PROCEDURE — 99499 UNLISTED E&M SERVICE: CPT | Mod: HCNC,S$GLB,, | Performed by: NURSE PRACTITIONER

## 2020-09-09 PROCEDURE — 99999 PR PBB SHADOW E&M-EST. PATIENT-LVL V: CPT | Mod: PBBFAC,HCNC,, | Performed by: NURSE PRACTITIONER

## 2020-09-09 PROCEDURE — 99499 NO LOS: ICD-10-PCS | Mod: HCNC,S$GLB,, | Performed by: NURSE PRACTITIONER

## 2020-09-09 PROCEDURE — 99999 PR PBB SHADOW E&M-EST. PATIENT-LVL V: ICD-10-PCS | Mod: PBBFAC,HCNC,, | Performed by: NURSE PRACTITIONER

## 2020-09-09 NOTE — PROGRESS NOTES
Subjective:       Patient ID: Janusz Montgomery Jr. is a 75 y.o. male.    Chief Complaint: Wound Check    Wound Check     75 y.o. male referred by Dr. Kumar for evaluation and management of an open wound to the left lower leg.  He was seen by Dr. Kumar on 6/12/20 at which time he presented for evaluation of his wound which had been present for the past year. He requested and underwent a biopsy of the leg wound on that date.  The biopsy was positive for BCC.  He used efudex for about a month.  A coflex compression wrap was placed on the leg on his last visit.  The wound is healing as evidenced by increased granulation and wound contracture. He has neuropathy and is on gabapentin.  He is afebrile. He denies increased redness, swelling or purulent drainage.  He does not complain of pain.     Patient Active Problem List   Diagnosis    Essential hypertension    Other hyperlipidemia    Coronary artery disease involving native coronary artery of native heart without angina pectoris s/p RCA stent    Obstructive sleep apnea    GERD (gastroesophageal reflux disease)    Benign prostatic hyperplasia with urinary obstruction    Morbid obesity with body mass index of 45.0-49.9 in adult    Prostate cancer    Paroxysmal atrial fibrillation     Major depressive disorder, recurrent episode, mild    Generalized anxiety disorder    History of gout    Herpes simplex keratoconjunctivitis    Decreased range of motion of left ankle    Right leg weakness    Impaired mobility    Balance problems    PVD (peripheral vascular disease)    Bilateral leg edema    Bilateral carotid artery stenosis    Gait abnormality    Left leg weakness    Delayed sleep phase syndrome    Floppy eyelid syndrome    Dyspnea    Insomnia    Restrictive lung disease    Personal history of asbestosis    CAD (coronary artery disease)    Varicose veins of leg with complications    Delayed surgical wound healing    Venous stasis  "dermatitis of both lower extremities    Acute kidney injury superimposed on chronic kidney disease    CKD (chronic kidney disease) stage 3, GFR 30-59 ml/min    Hypotension     Past Medical History:   Diagnosis Date    NAT (acute kidney injury) 3/19/2017    ALLERGIC RHINITIS     Anemia     Anxiety     Basal cell carcinoma 10/19/2018    forehead and right medial shoulder    Basal cell carcinoma 01/09/2019    left nasal bridge and left posterior ear    Chronic rhinitis 5/3/2013    Chronic rhinitis 5/3/2013    Coronary artery disease involving native coronary artery of native heart without angina pectoris s/p RCA stent     Cortical cataract of both eyes 3/18/2016    Delayed sleep phase syndrome 3/13/2019    Depression     Erectile dysfunction 3/24/2014    Erectile dysfunction 3/24/2014    Essential hypertension     GERD (gastroesophageal reflux disease) 7/25/2012    Gout, arthritis     Grade III open fracture of left tibia and fibula s/p ex-fix on 7/1/16 and ORIF of left tibia on 7/15 7/6/2016    H/O: iritis     Helicobacter pylori (H. pylori) infection     Treated    Herpes simplex keratoconjunctivitis 9/30/2015    - on acyclovir - followed by opthalmology, Dr. Uribe     Herpes simplex keratoconjunctivitis 9/30/2015    - on acyclovir - followed by opthalmology, Dr. Uribe     Hyperkalemia 2/28/2017    Hyperlipidemia     Hypogonadism male     Hypogonadism male     Mixed anxiety and depressive disorder     Morbid obesity     Obstructive sleep apnea on CPAP     CPAP    Osteoarthritis of left knee 7/25/2012    Paroxysmal atrial fibrillation 7/6/2016    Primary osteoarthritis of left knee 7/25/2012    Prominent aorta 1/25/2016    "RESULTS: THE HEART IS MILDLY ENLARGED WITH A SLIGHTLY PROMINENT AORTA" - Xray Chest PA & Lateral 12-     Prostate cancer 2/15/2016    - followed by urology, Dr. Young     Prostate cancer 2/15/2016    - followed by urology, Dr. 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 Conteh    CATARACT EXTRACTION W/  INTRAOCULAR LENS IMPLANT Left 4/12/2016        COLONOSCOPY N/A 7/23/2020    Procedure: COLONOSCOPY;  Surgeon: Nico Lackey MD;  Location: [facility] ([address] [place] [address]);  Service: Endoscopy;  Laterality: N/A;    ESOPHAGOGASTRODUODENOSCOPY N/A 7/23/2020    Procedure: EGD (ESOPHAGOGASTRODUODENOSCOPY);  Surgeon: Nico Lackey MD;  Location: [facility] ([address] [place] [address]);  Service: Endoscopy;  Laterality: N/A;  per Dr Lackey-Will proceed with EGD and colonoscopy on the 2nd floor due to his comorbidities including obesity, sleep apnea, restrictive lung disease, coronary artery disease.  has loop recorder      ok to hold Eliquis 2 days per Dr Sandhu-must remain    EXTERNAL FIXATION TIBIAL FRACTURE Left 07/01/2016    INSERTION OF IMPLANTABLE LOOP RECORDER  04/07/2017    LEFT HEART CATHETERIZATION Left 1/30/2020    Procedure: Left heart cath;  Surgeon: Benjamin Sandhu MD;  Location: [facility] CATH LAB;  Service: Cardiology;  Laterality: Left;  RN PREOP 1/28/20--Pt starting Plavix loading dose today (8pills)- Dr. Sandhu aware.  Pt has a bandaged "non healing area to LLE"--Dr. Sandhu aware.    LEFT HEART CATHETERIZATION Left 2/14/2020    Procedure: Left heart cath, IVUS guided left main / LAD PCI. Noon start, radial approach;  Surgeon: Miguel Angel Brady MD;  Location: [facility] CATH LAB;  Service: " Cardiology;  Laterality: Left;  RN PRE OP 2-7-2020  BMI--45.61    ORIF TIBIA FRACTURE Left 07/15/2016    RADIOACTIVE SEED IMPLANTATION OF PROSTATE N/A 8/8/2018    Procedure: INSERTION, RADIOACTIVE SEED, PROSTATE;  Surgeon: Bipin Thompson MD;  Location: [facility] OR [address] [place] [address];  Service: Urology;  Laterality: N/A;  1 hour    Squamous cell cancer removal x3 with Mohs surgery      TONSILLECTOMY      TOTAL KNEE ARTHROPLASTY  10/2012    trus/bx      ULTRASOUND GUIDANCE  2/14/2020    Procedure: Ultrasound Guidance;  Surgeon: Miguel Angel Brady MD;  Location: [facility] CATH LAB;  Service: Cardiology;;     Review of Systems      Unchanged from previous visit.  Objective:      Physical Exam  Vitals signs and nursing note reviewed.   Constitutional:       General: He is not in acute distress.     Appearance: He is well-developed. He is not diaphoretic.   HENT:      Head: Normocephalic and atraumatic.   Pulmonary:      Effort: Pulmonary effort is normal. No respiratory distress.   Musculoskeletal: Normal range of motion.         General: No tenderness.        Legs:    Skin:     General: Skin is warm and dry.      Findings: No erythema or rash.   Neurological:      Mental Status: He is alert and oriented to person, place, and time.   Psychiatric:         Behavior: Behavior normal.         Thought Content: Thought content normal.         Judgment: Judgment normal.               Janusz was seen in the clinic room and placed in the supine position on the treatment table.  The left leg was cleansed with Easi-clense sponges and dried thoroughly.  Eucerin cream was applied to the lower legs. Calmoseptine was applied to the periwound area. Santyl and a hydrofiber dressing was applied to the wound and covered with an ABD pad.  The patient's foot was positioned at a 90 degree angle.  The coflex with calamine was applied per package instructions.  A two layered application was performed using a spiral technique beginning with the foam layer  followed by the cohesive bandage avoiding creases or folds.  The wrap was started behind the first metatarsal and ended below the tibial tubercle of the knee.  There was overlap of each turn half the width of the previous turn.  The compression wrap will be changed every 7 days.    Assessment:       1. Delayed surgical wound healing, initial encounter    2. Varicose veins of leg with complications               Plan:          Benedryl 50 mg every 4 hours as needed for itching.  Triamncinolone cream to affected area on legs.  Coflex compression wrap with calamine left lower leg as detailed above.  Patient was warned not to get the dressings wet and to use cast covers for showering.  Should the dressing become wet, he is to remove it, place a wet-to-dry dressing over the wound, cover with gauze and roll gauze and use ace wraps for compression and to secure bandages.  He should then notify this office as soon as possible to have a new dressing applied.  Return to clinic in one week.

## 2020-09-09 NOTE — TELEPHONE ENCOUNTER
Spoke with  to schedule a overdue follow up with  office . Pt is now scheduled on Monday 09/28 with Elli . A EKG will be performed prior to the appt . Pt verbalized understanding.

## 2020-09-10 ENCOUNTER — LAB VISIT (OUTPATIENT)
Dept: LAB | Facility: HOSPITAL | Age: 75
End: 2020-09-10
Attending: NURSE PRACTITIONER
Payer: MEDICARE

## 2020-09-10 ENCOUNTER — OFFICE VISIT (OUTPATIENT)
Dept: FAMILY MEDICINE | Facility: CLINIC | Age: 75
End: 2020-09-10
Payer: MEDICARE

## 2020-09-10 VITALS
TEMPERATURE: 98 F | DIASTOLIC BLOOD PRESSURE: 54 MMHG | WEIGHT: 315 LBS | HEIGHT: 73 IN | SYSTOLIC BLOOD PRESSURE: 106 MMHG | BODY MASS INDEX: 41.75 KG/M2 | OXYGEN SATURATION: 96 % | HEART RATE: 73 BPM

## 2020-09-10 DIAGNOSIS — L97.321 CHRONIC ULCER OF LEFT ANKLE LIMITED TO BREAKDOWN OF SKIN: ICD-10-CM

## 2020-09-10 DIAGNOSIS — R09.89 LABILE BLOOD PRESSURE: ICD-10-CM

## 2020-09-10 DIAGNOSIS — I48.0 PAROXYSMAL ATRIAL FIBRILLATION: ICD-10-CM

## 2020-09-10 DIAGNOSIS — F33.0 MAJOR DEPRESSIVE DISORDER, RECURRENT EPISODE, MILD: ICD-10-CM

## 2020-09-10 DIAGNOSIS — E66.01 MORBID OBESITY WITH BODY MASS INDEX OF 45.0-49.9 IN ADULT: ICD-10-CM

## 2020-09-10 DIAGNOSIS — G47.33 OBSTRUCTIVE SLEEP APNEA: ICD-10-CM

## 2020-09-10 DIAGNOSIS — N18.30 CKD (CHRONIC KIDNEY DISEASE) STAGE 3, GFR 30-59 ML/MIN: ICD-10-CM

## 2020-09-10 DIAGNOSIS — I25.10 CORONARY ARTERY DISEASE INVOLVING NATIVE CORONARY ARTERY OF NATIVE HEART WITHOUT ANGINA PECTORIS: ICD-10-CM

## 2020-09-10 DIAGNOSIS — I73.9 PVD (PERIPHERAL VASCULAR DISEASE): ICD-10-CM

## 2020-09-10 DIAGNOSIS — Z09 HOSPITAL DISCHARGE FOLLOW-UP: Primary | ICD-10-CM

## 2020-09-10 PROCEDURE — 3008F PR BODY MASS INDEX (BMI) DOCUMENTED: ICD-10-PCS | Mod: HCNC,CPTII,S$GLB, | Performed by: NURSE PRACTITIONER

## 2020-09-10 PROCEDURE — 36415 COLL VENOUS BLD VENIPUNCTURE: CPT | Mod: HCNC,PO

## 2020-09-10 PROCEDURE — 3074F PR MOST RECENT SYSTOLIC BLOOD PRESSURE < 130 MM HG: ICD-10-PCS | Mod: HCNC,CPTII,S$GLB, | Performed by: NURSE PRACTITIONER

## 2020-09-10 PROCEDURE — 1159F PR MEDICATION LIST DOCUMENTED IN MEDICAL RECORD: ICD-10-PCS | Mod: HCNC,S$GLB,, | Performed by: NURSE PRACTITIONER

## 2020-09-10 PROCEDURE — 99499 UNLISTED E&M SERVICE: CPT | Mod: HCNC,S$GLB,, | Performed by: NURSE PRACTITIONER

## 2020-09-10 PROCEDURE — 99499 RISK ADDL DX/OHS AUDIT: ICD-10-PCS | Mod: HCNC,S$GLB,, | Performed by: NURSE PRACTITIONER

## 2020-09-10 PROCEDURE — 3078F PR MOST RECENT DIASTOLIC BLOOD PRESSURE < 80 MM HG: ICD-10-PCS | Mod: HCNC,CPTII,S$GLB, | Performed by: NURSE PRACTITIONER

## 2020-09-10 PROCEDURE — 1126F PR PAIN SEVERITY QUANTIFIED, NO PAIN PRESENT: ICD-10-PCS | Mod: HCNC,S$GLB,, | Performed by: NURSE PRACTITIONER

## 2020-09-10 PROCEDURE — 1101F PT FALLS ASSESS-DOCD LE1/YR: CPT | Mod: HCNC,CPTII,S$GLB, | Performed by: NURSE PRACTITIONER

## 2020-09-10 PROCEDURE — 3078F DIAST BP <80 MM HG: CPT | Mod: HCNC,CPTII,S$GLB, | Performed by: NURSE PRACTITIONER

## 2020-09-10 PROCEDURE — 99999 PR PBB SHADOW E&M-EST. PATIENT-LVL V: CPT | Mod: PBBFAC,HCNC,, | Performed by: NURSE PRACTITIONER

## 2020-09-10 PROCEDURE — 99999 PR PBB SHADOW E&M-EST. PATIENT-LVL V: ICD-10-PCS | Mod: PBBFAC,HCNC,, | Performed by: NURSE PRACTITIONER

## 2020-09-10 PROCEDURE — 83835 ASSAY OF METANEPHRINES: CPT | Mod: HCNC

## 2020-09-10 PROCEDURE — 1126F AMNT PAIN NOTED NONE PRSNT: CPT | Mod: HCNC,S$GLB,, | Performed by: NURSE PRACTITIONER

## 2020-09-10 PROCEDURE — 3074F SYST BP LT 130 MM HG: CPT | Mod: HCNC,CPTII,S$GLB, | Performed by: NURSE PRACTITIONER

## 2020-09-10 PROCEDURE — 3008F BODY MASS INDEX DOCD: CPT | Mod: HCNC,CPTII,S$GLB, | Performed by: NURSE PRACTITIONER

## 2020-09-10 PROCEDURE — 99214 PR OFFICE/OUTPT VISIT, EST, LEVL IV, 30-39 MIN: ICD-10-PCS | Mod: HCNC,S$GLB,, | Performed by: NURSE PRACTITIONER

## 2020-09-10 PROCEDURE — 99214 OFFICE O/P EST MOD 30 MIN: CPT | Mod: HCNC,S$GLB,, | Performed by: NURSE PRACTITIONER

## 2020-09-10 PROCEDURE — 1159F MED LIST DOCD IN RCRD: CPT | Mod: HCNC,S$GLB,, | Performed by: NURSE PRACTITIONER

## 2020-09-10 PROCEDURE — 1101F PR PT FALLS ASSESS DOC 0-1 FALLS W/OUT INJ PAST YR: ICD-10-PCS | Mod: HCNC,CPTII,S$GLB, | Performed by: NURSE PRACTITIONER

## 2020-09-10 RX ORDER — VENLAFAXINE 75 MG/1
150 TABLET ORAL 2 TIMES DAILY
Qty: 360 TABLET | Refills: 1 | Status: SHIPPED | OUTPATIENT
Start: 2020-09-10 | End: 2021-06-17 | Stop reason: SDUPTHER
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
## 2020-09-10 NOTE — PATIENT INSTRUCTIONS
Check labs that nephrology ordered  Continue all current medications  Follow up with Dr. Wetahers in 2/2021

## 2020-09-16 ENCOUNTER — OFFICE VISIT (OUTPATIENT)
Dept: WOUND CARE | Facility: CLINIC | Age: 75
End: 2020-09-16
Payer: MEDICARE

## 2020-09-16 VITALS
SYSTOLIC BLOOD PRESSURE: 153 MMHG | WEIGHT: 315 LBS | DIASTOLIC BLOOD PRESSURE: 72 MMHG | BODY MASS INDEX: 41.75 KG/M2 | TEMPERATURE: 98 F | HEIGHT: 73 IN | HEART RATE: 85 BPM

## 2020-09-16 DIAGNOSIS — I83.899 VARICOSE VEINS OF LEG WITH COMPLICATIONS: ICD-10-CM

## 2020-09-16 DIAGNOSIS — T81.89XA DELAYED SURGICAL WOUND HEALING, INITIAL ENCOUNTER: Primary | ICD-10-CM

## 2020-09-16 PROCEDURE — 99499 NO LOS: ICD-10-PCS | Mod: HCNC,S$GLB,, | Performed by: NURSE PRACTITIONER

## 2020-09-16 PROCEDURE — 99999 PR PBB SHADOW E&M-EST. PATIENT-LVL V: ICD-10-PCS | Mod: PBBFAC,HCNC,, | Performed by: NURSE PRACTITIONER

## 2020-09-16 PROCEDURE — 29580 PR STRAPPING UNNA BOOT: ICD-10-PCS | Mod: HCNC,LT,S$GLB, | Performed by: NURSE PRACTITIONER

## 2020-09-16 PROCEDURE — 99999 PR PBB SHADOW E&M-EST. PATIENT-LVL V: CPT | Mod: PBBFAC,HCNC,, | Performed by: NURSE PRACTITIONER

## 2020-09-16 PROCEDURE — 99499 UNLISTED E&M SERVICE: CPT | Mod: HCNC,S$GLB,, | Performed by: NURSE PRACTITIONER

## 2020-09-16 PROCEDURE — 29580 STRAPPING UNNA BOOT: CPT | Mod: HCNC,LT,S$GLB, | Performed by: NURSE PRACTITIONER

## 2020-09-16 NOTE — PROGRESS NOTES
Subjective:       Patient ID: Janusz Montgomery Jr. is a 75 y.o. male.    Chief Complaint: Wound Check    Wound Check     75 y.o. male referred by Dr. Kumar for evaluation and management of an open wound to the left lower leg.  He was seen by Dr. Kumar on 6/12/20 at which time he presented for evaluation of his wound which had been present for the past year. He requested and underwent a biopsy of the leg wound on that date.  The biopsy was positive for BCC.  He used efudex for about a month.  A coflex compression wrap was placed on the leg on his last visit.  The wound is healing as evidenced by increased granulation and wound contracture. He has neuropathy and is on gabapentin.  He is afebrile. He denies increased redness, swelling or purulent drainage.  He does not complain of pain.     Patient Active Problem List   Diagnosis    Essential hypertension    Other hyperlipidemia    Coronary artery disease involving native coronary artery of native heart without angina pectoris s/p RCA stent    Obstructive sleep apnea    GERD (gastroesophageal reflux disease)    Benign prostatic hyperplasia with urinary obstruction    Morbid obesity with body mass index of 45.0-49.9 in adult    Prostate cancer    Paroxysmal atrial fibrillation     Major depressive disorder, recurrent episode, mild    Generalized anxiety disorder    History of gout    Herpes simplex keratoconjunctivitis    Decreased range of motion of left ankle    Right leg weakness    Impaired mobility    Balance problems    PVD (peripheral vascular disease)    Bilateral leg edema    Bilateral carotid artery stenosis    Gait abnormality    Left leg weakness    Delayed sleep phase syndrome    Floppy eyelid syndrome    Dyspnea    Insomnia    Restrictive lung disease    Personal history of asbestosis    CAD (coronary artery disease)    Varicose veins of leg with complications    Delayed surgical wound healing    Venous stasis  "dermatitis of both lower extremities    Acute kidney injury superimposed on chronic kidney disease    CKD (chronic kidney disease) stage 3, GFR 30-59 ml/min    Hypotension     Past Medical History:   Diagnosis Date    NAT (acute kidney injury) 3/19/2017    ALLERGIC RHINITIS     Anemia     Anxiety     Basal cell carcinoma 10/19/2018    forehead and right medial shoulder    Basal cell carcinoma 01/09/2019    left nasal bridge and left posterior ear    Chronic rhinitis 5/3/2013    Chronic rhinitis 5/3/2013    Coronary artery disease involving native coronary artery of native heart without angina pectoris s/p RCA stent     Cortical cataract of both eyes 3/18/2016    Delayed sleep phase syndrome 3/13/2019    Depression     Erectile dysfunction 3/24/2014    Erectile dysfunction 3/24/2014    Essential hypertension     GERD (gastroesophageal reflux disease) 7/25/2012    Gout, arthritis     Grade III open fracture of left tibia and fibula s/p ex-fix on 7/1/16 and ORIF of left tibia on 7/15 7/6/2016    H/O: iritis     Helicobacter pylori (H. pylori) infection     Treated    Herpes simplex keratoconjunctivitis 9/30/2015    - on acyclovir - followed by opthalmology, Dr. Uribe     Herpes simplex keratoconjunctivitis 9/30/2015    - on acyclovir - followed by opthalmology, Dr. Uribe     Hyperkalemia 2/28/2017    Hyperlipidemia     Hypogonadism male     Hypogonadism male     Mixed anxiety and depressive disorder     Morbid obesity     Obstructive sleep apnea on CPAP     CPAP    Osteoarthritis of left knee 7/25/2012    Paroxysmal atrial fibrillation 7/6/2016    Primary osteoarthritis of left knee 7/25/2012    Prominent aorta 1/25/2016    "RESULTS: THE HEART IS MILDLY ENLARGED WITH A SLIGHTLY PROMINENT AORTA" - Xray Chest PA & Lateral 12-     Prostate cancer 2/15/2016    - followed by urology, Dr. Young     Prostate cancer 2/15/2016    - followed by urology, Dr. 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 Conteh    CATARACT EXTRACTION W/  INTRAOCULAR LENS IMPLANT Left 4/12/2016        COLONOSCOPY N/A 7/23/2020    Procedure: COLONOSCOPY;  Surgeon: Nico Lackey MD;  Location: [facility] ([address] [place] [address]);  Service: Endoscopy;  Laterality: N/A;    ESOPHAGOGASTRODUODENOSCOPY N/A 7/23/2020    Procedure: EGD (ESOPHAGOGASTRODUODENOSCOPY);  Surgeon: Nico Lackey MD;  Location: [facility] ([address] [place] [address]);  Service: Endoscopy;  Laterality: N/A;  per Dr Lackey-Will proceed with EGD and colonoscopy on the 2nd floor due to his comorbidities including obesity, sleep apnea, restrictive lung disease, coronary artery disease.  has loop recorder      ok to hold Eliquis 2 days per Dr Sandhu-must remain    EXTERNAL FIXATION TIBIAL FRACTURE Left 07/01/2016    INSERTION OF IMPLANTABLE LOOP RECORDER  04/07/2017    LEFT HEART CATHETERIZATION Left 1/30/2020    Procedure: Left heart cath;  Surgeon: Benjamin Sandhu MD;  Location: [facility] CATH LAB;  Service: Cardiology;  Laterality: Left;  RN PREOP 1/28/20--Pt starting Plavix loading dose today (8pills)- Dr. Sandhu aware.  Pt has a bandaged "non healing area to LLE"--Dr. Sandhu aware.    LEFT HEART CATHETERIZATION Left 2/14/2020    Procedure: Left heart cath, IVUS guided left main / LAD PCI. Noon start, radial approach;  Surgeon: Miguel Angel Brady MD;  Location: [facility] CATH LAB;  Service: " Cardiology;  Laterality: Left;  RN PRE OP 2-7-2020  BMI--45.61    ORIF TIBIA FRACTURE Left 07/15/2016    RADIOACTIVE SEED IMPLANTATION OF PROSTATE N/A 8/8/2018    Procedure: INSERTION, RADIOACTIVE SEED, PROSTATE;  Surgeon: Bipin Thompson MD;  Location: [facility] OR [address] [place] [address];  Service: Urology;  Laterality: N/A;  1 hour    Squamous cell cancer removal x3 with Mohs surgery      TONSILLECTOMY      TOTAL KNEE ARTHROPLASTY  10/2012    trus/bx      ULTRASOUND GUIDANCE  2/14/2020    Procedure: Ultrasound Guidance;  Surgeon: Miguel Angel Brady MD;  Location: [facility] CATH LAB;  Service: Cardiology;;     Review of Systems      Unchanged from previous visit.  Objective:      Physical Exam  Vitals signs and nursing note reviewed.   Constitutional:       General: He is not in acute distress.     Appearance: He is well-developed. He is not diaphoretic.   HENT:      Head: Normocephalic and atraumatic.   Pulmonary:      Effort: Pulmonary effort is normal. No respiratory distress.   Musculoskeletal: Normal range of motion.         General: No tenderness.        Legs:    Skin:     General: Skin is warm and dry.      Findings: No erythema or rash.   Neurological:      Mental Status: He is alert and oriented to person, place, and time.   Psychiatric:         Behavior: Behavior normal.         Thought Content: Thought content normal.         Judgment: Judgment normal.             Janusz was seen in the clinic room and placed in the supine position on the treatment table.  The left leg was cleansed with Easi-clense sponges and dried thoroughly.  Eucerin cream was applied to the lower legs. Calmoseptine was applied to the periwound area. Santyl and a hydrofiber dressing was applied to the wound and covered with an ABD pad.  The patient's foot was positioned at a 90 degree angle.  The coflex with calamine was applied per package instructions.  A two layered application was performed using a spiral technique beginning with the foam layer followed  by the cohesive bandage avoiding creases or folds.  The wrap was started behind the first metatarsal and ended below the tibial tubercle of the knee.  There was overlap of each turn half the width of the previous turn.  The compression wrap will be changed every 7 days.    Assessment:       1. Delayed surgical wound healing, initial encounter    2. Varicose veins of leg with complications               Plan:          Benedryl 50 mg every 4 hours as needed for itching.  Triamncinolone cream to affected area on legs.  Coflex compression wrap with calamine left lower leg as detailed above.  Patient was warned not to get the dressings wet and to use cast covers for showering.  Should the dressing become wet, he is to remove it, place a wet-to-dry dressing over the wound, cover with gauze and roll gauze and use ace wraps for compression and to secure bandages.  He should then notify this office as soon as possible to have a new dressing applied.  Return to clinic in one week.

## 2020-09-17 ENCOUNTER — PATIENT OUTREACH (OUTPATIENT)
Dept: OTHER | Facility: OTHER | Age: 75
End: 2020-09-17
Payer: MEDICARE

## 2020-09-18 ENCOUNTER — TELEPHONE (OUTPATIENT)
Dept: VASCULAR SURGERY | Facility: CLINIC | Age: 75
End: 2020-09-18

## 2020-09-18 ENCOUNTER — TELEPHONE (OUTPATIENT)
Dept: WOUND CARE | Facility: CLINIC | Age: 75
End: 2020-09-18

## 2020-09-18 DIAGNOSIS — I73.9 PVD (PERIPHERAL VASCULAR DISEASE): Primary | ICD-10-CM

## 2020-09-18 LAB
METANEPH FREE SERPL-MCNC: <25 PG/ML
METANEPHS SERPL-MCNC: 174 PG/ML
NORMETANEPH FREE SERPL-MCNC: 174 PG/ML

## 2020-09-18 NOTE — TELEPHONE ENCOUNTER
----- Message from Lc Henley sent at 9/18/2020 12:19 PM CDT -----  Regarding: Bri-Spouse  Type: Patient Call Back    Who called: Bri-Spouse    What is the request in detail: please contact the pt about a wound on his ankle that is wrapped    Can the clinic reply by [facility]? no    Would the patient rather a call back or a response via [facility]?  call  Best call back number: [phone]

## 2020-09-18 NOTE — TELEPHONE ENCOUNTER
Returned call and spoke with patient - advised patient that I scheduled his vascular lab study for Wednesday, 9/23/2020 at 1pm.  Patient instructed the morning of the appointment to unroll each layer of the compression wrap from the top going down, remove the dressing over the wound, shower with a mild soap and irrigate the wound for 5 minutes with luke warm water.  Pat dry and apply a dry gauze dressing with paper tape.  Patient also advised to come to the 5th floor of the clinic to check in for this study.  I advised the patient that I will call him again next Tuesday to review the instructions prior to his appointment.

## 2020-09-18 NOTE — TELEPHONE ENCOUNTER
----- Message from Radha Sharif RN sent at 9/18/2020  2:11 PM CDT -----  Regarding: SAMANTHA next week  Hello this patient is seeing Marzena Mancilla next week, any way he can get a SAMANTHA prior to his dressing change? He called today asking if he needed any test prior. I didn't realize he had a wound. I put in the order for [facility] but I don't have access to schedule. And if she can take pictures of the wound when the dressing is changed then Dr. Chaudhry can reveiw them without taking the dressing off. Thanks so much. Just let me know and I can contact the patient  Radha

## 2020-09-18 NOTE — TELEPHONE ENCOUNTER
Call and spoke to patient. He stated he is going to wound care for a wound and needed to know if the doctor would need to unwrap it. Asked patient if he had any wounds and he stated yes one. Explained would need another ultrasound to check his blood flow. Explained would reach out to [facility] and see if we could do it prior to his dressing change. He stated understanding.

## 2020-09-22 ENCOUNTER — TELEPHONE (OUTPATIENT)
Dept: ORTHOPEDICS | Facility: CLINIC | Age: 75
End: 2020-09-22

## 2020-09-22 NOTE — TELEPHONE ENCOUNTER
Spoke with patient's wife. Explained that today's appointment was to pick surgery date and sign consents. She stated he did not sleep well and will be calling to R/S.     ----- Message from Felicia Shin sent at 9/22/2020  8:03 AM CDT -----        Patients wife (Bri) calls - patient (Janusz)  has appt today at 2:30 and he is questioning what the purpose of todays visit is?    Patients wife can be reached @# [phone]

## 2020-09-22 NOTE — TELEPHONE ENCOUNTER
Spoke with patient. Explained that Dr. Swift operates on Wednesdays and Fridays at [facility], earliest he can have surgery after CSI is 10/14. Explained that he will need a clinic visit to sign consents and schedule covid test.    Noemy Michaels MS, [facility]  Orthopedic Clinical Assistant to Dr. Rad Swift  [facility]   ----- Message from Genesis Romero sent at 9/22/2020 12:18 PM CDT -----  Contact: pt  Please call pt at [phone]    Patient has questions about today's appt     Thank you

## 2020-09-23 ENCOUNTER — OFFICE VISIT (OUTPATIENT)
Dept: WOUND CARE | Facility: CLINIC | Age: 75
End: 2020-09-23
Payer: MEDICARE

## 2020-09-23 ENCOUNTER — HOSPITAL ENCOUNTER (OUTPATIENT)
Dept: VASCULAR SURGERY | Facility: CLINIC | Age: 75
Discharge: HOME | End: 2020-09-23
Attending: SURGERY
Payer: MEDICARE

## 2020-09-23 VITALS
HEIGHT: 73 IN | TEMPERATURE: 98 F | SYSTOLIC BLOOD PRESSURE: 136 MMHG | DIASTOLIC BLOOD PRESSURE: 70 MMHG | HEART RATE: 79 BPM | BODY MASS INDEX: 41.75 KG/M2 | WEIGHT: 315 LBS

## 2020-09-23 DIAGNOSIS — I73.9 PVD (PERIPHERAL VASCULAR DISEASE): ICD-10-CM

## 2020-09-23 DIAGNOSIS — T81.89XA DELAYED SURGICAL WOUND HEALING, INITIAL ENCOUNTER: Primary | ICD-10-CM

## 2020-09-23 DIAGNOSIS — I83.899 VARICOSE VEINS OF LEG WITH COMPLICATIONS: ICD-10-CM

## 2020-09-23 PROCEDURE — 99499 NO LOS: ICD-10-PCS | Mod: HCNC,S$GLB,, | Performed by: NURSE PRACTITIONER

## 2020-09-23 PROCEDURE — 99999 PR PBB SHADOW E&M-EST. PATIENT-LVL V: ICD-10-PCS | Mod: PBBFAC,HCNC,, | Performed by: NURSE PRACTITIONER

## 2020-09-23 PROCEDURE — 93923 UPR/LXTR ART STDY 3+ LVLS: CPT | Mod: HCNC,S$GLB,, | Performed by: SURGERY

## 2020-09-23 PROCEDURE — 29580 PR STRAPPING UNNA BOOT: ICD-10-PCS | Mod: HCNC,LT,S$GLB, | Performed by: NURSE PRACTITIONER

## 2020-09-23 PROCEDURE — 29580 STRAPPING UNNA BOOT: CPT | Mod: HCNC,LT,S$GLB, | Performed by: NURSE PRACTITIONER

## 2020-09-23 PROCEDURE — 99999 PR PBB SHADOW E&M-EST. PATIENT-LVL V: CPT | Mod: PBBFAC,HCNC,, | Performed by: NURSE PRACTITIONER

## 2020-09-23 PROCEDURE — 99499 UNLISTED E&M SERVICE: CPT | Mod: HCNC,S$GLB,, | Performed by: NURSE PRACTITIONER

## 2020-09-23 PROCEDURE — 93923 PR NON-INVASIVE PHYSIOLOGIC STUDY EXTREMITY 3 LEVELS: ICD-10-PCS | Mod: HCNC,S$GLB,, | Performed by: SURGERY

## 2020-09-23 NOTE — PROGRESS NOTES
Subjective:       Patient ID: Janusz Montgomery Jr. is a 75 y.o. male.    Chief Complaint: Wound Check    Wound Check       75 y.o. male referred by Dr. Kumar for evaluation and management of an open wound to the left lower leg.  He was seen by Dr. Kumar on 6/12/20 at which time he presented for evaluation of his wound which had been present for the past year. He requested and underwent a biopsy of the leg wound on that date.  The biopsy was positive for BCC.  He used efudex for about a month.  A coflex compression wrap was placed on the leg on his last visit.  The wound is healing as evidenced by increased granulation and wound contracture. He has neuropathy and is on gabapentin.  He is afebrile. He denies increased redness, swelling or purulent drainage.  He does not complain of pain.     Patient Active Problem List   Diagnosis    Essential hypertension    Other hyperlipidemia    Coronary artery disease involving native coronary artery of native heart without angina pectoris s/p RCA stent    Obstructive sleep apnea    GERD (gastroesophageal reflux disease)    Benign prostatic hyperplasia with urinary obstruction    Morbid obesity with body mass index of 45.0-49.9 in adult    Prostate cancer    Paroxysmal atrial fibrillation     Major depressive disorder, recurrent episode, mild    Generalized anxiety disorder    History of gout    Herpes simplex keratoconjunctivitis    Decreased range of motion of left ankle    Right leg weakness    Impaired mobility    Balance problems    PVD (peripheral vascular disease)    Bilateral leg edema    Bilateral carotid artery stenosis    Gait abnormality    Left leg weakness    Delayed sleep phase syndrome    Floppy eyelid syndrome    Dyspnea    Insomnia    Restrictive lung disease    Personal history of asbestosis    CAD (coronary artery disease)    Varicose veins of leg with complications    Delayed surgical wound healing    Venous stasis  "dermatitis of both lower extremities    Acute kidney injury superimposed on chronic kidney disease    CKD (chronic kidney disease) stage 3, GFR 30-59 ml/min    Hypotension     Past Medical History:   Diagnosis Date    NAT (acute kidney injury) 3/19/2017    ALLERGIC RHINITIS     Anemia     Anxiety     Basal cell carcinoma 10/19/2018    forehead and right medial shoulder    Basal cell carcinoma 01/09/2019    left nasal bridge and left posterior ear    Chronic rhinitis 5/3/2013    Chronic rhinitis 5/3/2013    Coronary artery disease involving native coronary artery of native heart without angina pectoris s/p RCA stent     Cortical cataract of both eyes 3/18/2016    Delayed sleep phase syndrome 3/13/2019    Depression     Erectile dysfunction 3/24/2014    Erectile dysfunction 3/24/2014    Essential hypertension     GERD (gastroesophageal reflux disease) 7/25/2012    Gout, arthritis     Grade III open fracture of left tibia and fibula s/p ex-fix on 7/1/16 and ORIF of left tibia on 7/15 7/6/2016    H/O: iritis     Helicobacter pylori (H. pylori) infection     Treated    Herpes simplex keratoconjunctivitis 9/30/2015    - on acyclovir - followed by opthalmology, Dr. Uribe     Herpes simplex keratoconjunctivitis 9/30/2015    - on acyclovir - followed by opthalmology, Dr. Uribe     Hyperkalemia 2/28/2017    Hyperlipidemia     Hypogonadism male     Hypogonadism male     Mixed anxiety and depressive disorder     Morbid obesity     Obstructive sleep apnea on CPAP     CPAP    Osteoarthritis of left knee 7/25/2012    Paroxysmal atrial fibrillation 7/6/2016    Primary osteoarthritis of left knee 7/25/2012    Prominent aorta 1/25/2016    "RESULTS: THE HEART IS MILDLY ENLARGED WITH A SLIGHTLY PROMINENT AORTA" - Xray Chest PA & Lateral 12-     Prostate cancer 2/15/2016    - followed by urology, Dr. Young     Prostate cancer 2/15/2016    - followed by urology, Dr. 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 Conteh    CATARACT EXTRACTION W/  INTRAOCULAR LENS IMPLANT Left 4/12/2016        COLONOSCOPY N/A 7/23/2020    Procedure: COLONOSCOPY;  Surgeon: Nico Lackey MD;  Location: [facility] ([address] [place] [address]);  Service: Endoscopy;  Laterality: N/A;    ESOPHAGOGASTRODUODENOSCOPY N/A 7/23/2020    Procedure: EGD (ESOPHAGOGASTRODUODENOSCOPY);  Surgeon: Ncio Lackey MD;  Location: [facility] ([address] [place] [address]);  Service: Endoscopy;  Laterality: N/A;  per Dr Lackey-Will proceed with EGD and colonoscopy on the 2nd floor due to his comorbidities including obesity, sleep apnea, restrictive lung disease, coronary artery disease.  has loop recorder      ok to hold Eliquis 2 days per Dr Sandhu-must remain    EXTERNAL FIXATION TIBIAL FRACTURE Left 07/01/2016    INSERTION OF IMPLANTABLE LOOP RECORDER  04/07/2017    LEFT HEART CATHETERIZATION Left 1/30/2020    Procedure: Left heart cath;  Surgeon: Benjamin Sandhu MD;  Location: [facility] CATH LAB;  Service: Cardiology;  Laterality: Left;  RN PREOP 1/28/20--Pt starting Plavix loading dose today (8pills)- Dr. Sandhu aware.  Pt has a bandaged "non healing area to LLE"--Dr. Sandhu aware.    LEFT HEART CATHETERIZATION Left 2/14/2020    Procedure: Left heart cath, IVUS guided left main / LAD PCI. Noon start, radial approach;  Surgeon: Miguel Angel Brady MD;  Location: [facility] CATH LAB;  Service: " Cardiology;  Laterality: Left;  RN PRE OP 2-7-2020  BMI--45.61    ORIF TIBIA FRACTURE Left 07/15/2016    RADIOACTIVE SEED IMPLANTATION OF PROSTATE N/A 8/8/2018    Procedure: INSERTION, RADIOACTIVE SEED, PROSTATE;  Surgeon: Bipin Thompson MD;  Location: [facility] OR [address] [place] [address];  Service: Urology;  Laterality: N/A;  1 hour    Squamous cell cancer removal x3 with Mohs surgery      TONSILLECTOMY      TOTAL KNEE ARTHROPLASTY  10/2012    trus/bx      ULTRASOUND GUIDANCE  2/14/2020    Procedure: Ultrasound Guidance;  Surgeon: Miguel Angel Brady MD;  Location: [facility] CATH LAB;  Service: Cardiology;;     Review of Systems      Unchanged from previous visit.  Objective:      Physical Exam  Vitals signs and nursing note reviewed.   Constitutional:       General: He is not in acute distress.     Appearance: He is well-developed. He is not diaphoretic.   HENT:      Head: Normocephalic and atraumatic.   Pulmonary:      Effort: Pulmonary effort is normal. No respiratory distress.   Musculoskeletal: Normal range of motion.         General: No tenderness.       Skin:     General: Skin is warm and dry.      Findings: No erythema or rash.   Neurological:      Mental Status: He is alert and oriented to person, place, and time.   Psychiatric:         Behavior: Behavior normal.         Thought Content: Thought content normal.         Judgment: Judgment normal.         A vascular lab study performed today revealed right leg segmental pressures and PVR waveforms revealed minimal to mild PAOD.  Left leg segmental pressures and PVR waveforms revealed mild to moderate PAOD.    Janusz was seen in the clinic room and placed in the supine position on the treatment table.  The left leg was cleansed with Easi-clense sponges and dried thoroughly.  Eucerin cream was applied to the lower legs. Calmoseptine was applied to the periwound area. Santyl and a hydrofiber dressing was applied to the wound and covered with an ABD pad.  The patient's foot was  positioned at a 90 degree angle.  The coflex with calamine was applied per package instructions.  A two layered application was performed using a spiral technique beginning with the foam layer followed by the cohesive bandage avoiding creases or folds.  The wrap was started behind the first metatarsal and ended below the tibial tubercle of the knee.  There was overlap of each turn half the width of the previous turn.  The compression wrap will be changed every 7 days.    Assessment:       1. Delayed surgical wound healing, initial encounter    2. Varicose veins of leg with complications               Plan:          Benedryl 50 mg every 4 hours as needed for itching.  Triamncinolone cream to affected area on legs.  Coflex compression wrap with calamine left lower leg as detailed above.  Patient was warned not to get the dressings wet and to use cast covers for showering.  Should the dressing become wet, he is to remove it, place a wet-to-dry dressing over the wound, cover with gauze and roll gauze and use ace wraps for compression and to secure bandages.  He should then notify this office as soon as possible to have a new dressing applied.  Return to clinic in one week.

## 2020-09-24 ENCOUNTER — OFFICE VISIT (OUTPATIENT)
Dept: VASCULAR SURGERY | Facility: CLINIC | Age: 75
End: 2020-09-24
Payer: MEDICARE

## 2020-09-24 VITALS
DIASTOLIC BLOOD PRESSURE: 78 MMHG | WEIGHT: 315 LBS | BODY MASS INDEX: 41.75 KG/M2 | SYSTOLIC BLOOD PRESSURE: 110 MMHG | HEIGHT: 73 IN

## 2020-09-24 DIAGNOSIS — I83.023 VENOUS ULCER OF ANKLE, LEFT: ICD-10-CM

## 2020-09-24 DIAGNOSIS — L97.929 VENOUS ULCER OF LEFT LEG: Primary | ICD-10-CM

## 2020-09-24 DIAGNOSIS — L97.329 VENOUS ULCER OF ANKLE, LEFT: ICD-10-CM

## 2020-09-24 DIAGNOSIS — I83.029 VENOUS ULCER OF LEFT LEG: Primary | ICD-10-CM

## 2020-09-24 DIAGNOSIS — C44.719 BASAL CELL CARCINOMA, LEG, LEFT: ICD-10-CM

## 2020-09-24 DIAGNOSIS — R60.0 BILATERAL LEG EDEMA: ICD-10-CM

## 2020-09-24 PROCEDURE — 1126F PR PAIN SEVERITY QUANTIFIED, NO PAIN PRESENT: ICD-10-PCS | Mod: HCNC,S$GLB,, | Performed by: SURGERY

## 2020-09-24 PROCEDURE — 1126F AMNT PAIN NOTED NONE PRSNT: CPT | Mod: HCNC,S$GLB,, | Performed by: SURGERY

## 2020-09-24 PROCEDURE — 3288F FALL RISK ASSESSMENT DOCD: CPT | Mod: HCNC,CPTII,S$GLB, | Performed by: SURGERY

## 2020-09-24 PROCEDURE — 3074F SYST BP LT 130 MM HG: CPT | Mod: HCNC,CPTII,S$GLB, | Performed by: SURGERY

## 2020-09-24 PROCEDURE — 1100F PTFALLS ASSESS-DOCD GE2>/YR: CPT | Mod: HCNC,CPTII,S$GLB, | Performed by: SURGERY

## 2020-09-24 PROCEDURE — 99213 OFFICE O/P EST LOW 20 MIN: CPT | Mod: HCNC,S$GLB,, | Performed by: SURGERY

## 2020-09-24 PROCEDURE — 99999 PR PBB SHADOW E&M-EST. PATIENT-LVL III: CPT | Mod: PBBFAC,HCNC,, | Performed by: SURGERY

## 2020-09-24 PROCEDURE — 3288F PR FALLS RISK ASSESSMENT DOCUMENTED: ICD-10-PCS | Mod: HCNC,CPTII,S$GLB, | Performed by: SURGERY

## 2020-09-24 PROCEDURE — 99213 PR OFFICE/OUTPT VISIT, EST, LEVL III, 20-29 MIN: ICD-10-PCS | Mod: HCNC,S$GLB,, | Performed by: SURGERY

## 2020-09-24 PROCEDURE — 99999 PR PBB SHADOW E&M-EST. PATIENT-LVL III: ICD-10-PCS | Mod: PBBFAC,HCNC,, | Performed by: SURGERY

## 2020-09-24 PROCEDURE — 3008F PR BODY MASS INDEX (BMI) DOCUMENTED: ICD-10-PCS | Mod: HCNC,CPTII,S$GLB, | Performed by: SURGERY

## 2020-09-24 PROCEDURE — 1100F PR PT FALLS ASSESS DOC 2+ FALLS/FALL W/INJURY/YR: ICD-10-PCS | Mod: HCNC,CPTII,S$GLB, | Performed by: SURGERY

## 2020-09-24 PROCEDURE — 3078F DIAST BP <80 MM HG: CPT | Mod: HCNC,CPTII,S$GLB, | Performed by: SURGERY

## 2020-09-24 PROCEDURE — 3078F PR MOST RECENT DIASTOLIC BLOOD PRESSURE < 80 MM HG: ICD-10-PCS | Mod: HCNC,CPTII,S$GLB, | Performed by: SURGERY

## 2020-09-24 PROCEDURE — 3008F BODY MASS INDEX DOCD: CPT | Mod: HCNC,CPTII,S$GLB, | Performed by: SURGERY

## 2020-09-24 PROCEDURE — 1159F PR MEDICATION LIST DOCUMENTED IN MEDICAL RECORD: ICD-10-PCS | Mod: HCNC,S$GLB,, | Performed by: SURGERY

## 2020-09-24 PROCEDURE — 3074F PR MOST RECENT SYSTOLIC BLOOD PRESSURE < 130 MM HG: ICD-10-PCS | Mod: HCNC,CPTII,S$GLB, | Performed by: SURGERY

## 2020-09-24 PROCEDURE — 1159F MED LIST DOCD IN RCRD: CPT | Mod: HCNC,S$GLB,, | Performed by: SURGERY

## 2020-09-24 NOTE — LETTER
September 24, 2020      Miguelina Weathers MD  [address] [facility]  [place] [address]           [facility]  [address]  [place] [address]  Phone: [phone]  Fax: [phone]          Patient: Janusz Montgomery Jr.   MR Number: [id number]   YOB: 1945   Date of Visit: 9/24/2020       Dear Dr. Miguelina Weathers:    Thank you for referring Janusz Montgomery to me for evaluation. Attached you will find relevant portions of my assessment and plan of care.    If you have questions, please do not hesitate to call me. I look forward to following Janusz Montgomery along with you.    Sincerely,    Sukhi Chaudhry MD    Enclosure  CC:  No Recipients    If you would like to receive this communication electronically, please contact [email or web address] or [phone] to request more information on [employer] access.    For providers and/or their staff who would like to refer a patient to [facility], please contact us through our one-stop-shop provider referral line, [facility], at [phone].    If you feel you have received this communication in error or would no longer like to receive these types of communications, please e-mail [email or web address]

## 2020-09-24 NOTE — PATIENT INSTRUCTIONS
Wound Care  Taking proper care of your wound will help it heal. Your healthcare provider may show you how to clean and dress the wound. He or she will also explain how to tell if the wound is healing normally. If you are unsure of how to take care of the wound, be sure to clarify what dressing to use and how often you should change the bandages. Here are the basic steps.     A wound that's not healing normally may be dark in color or have white streaks.   Wash your hands  Tips for washing your hands include:  · Use liquid soap and lather for 2 minutes. Scrub between your fingers and under your nails.  · Rinse with warm water, keeping your fingers pointing down.  · Use a paper towel to dry your hands and to turn off the faucet.  Remove the used dressing  Here are suggestions for removing the dressing:  · If dressing changes cause you pain, be sure to take your pain medicine as prescribed by your healthcare provider 30 minutes before dressing changes.  · Set up your supplies.  · Put on disposable gloves if youre dressing a wound for someone else or your wound is infected.  · Loosen the tape by pulling gently toward the wound.  · Gently take off the old dressing. If the dressing is stuck to the wound, moisten it with saline (if available) or clean water.  · If you have a drain or tube in the wound, be careful not to pull on it.  · Remove the dressing 1 layer at a time and put it in a plastic bag.  · Remove your gloves.  Inspect and dress the wound  Check the wound carefully:  · Each time you change the dressing, inspect the wound carefully to be sure its healing normally by making sure your wound appears to be pink and moist, and is free of infection.    · Wash your hands again. Put on a new pair of gloves.  · Clean and dress the wound as directed by your healthcare provider or nurse. Do not put anything in the wound that is not prescribed or directed by your healthcare provider. If you have a drain or tube, be  careful not to pull on it. Make sure to secure the drain or tube as well.  · Put all supplies in a plastic bag. Seal the bag and put it in the trash.  · Be sure to wash your hands again.  Call your healthcare provider  Call your healthcare provider if you see any of the following signs of a problem:  · Bleeding that soaks the dressing  · Pink fluid weeping from the wound  · Increased drainage or drainage that is yellow, yellow-green, or foul-smelling  · Increased swelling or pain, or redness or swelling in the skin around the wound  · A change in the color of the wound, or if streaks develop in a direction away from the wound  · The area between any stitches opens up  · An increase in the size of the wound  · A fever of 100.4°F (38°C) or higher, or as directed by your healthcare provider  · Chills, increased fatigue, or a loss of appetite      Date Last Reviewed: 7/30/2015  © 0005-5605 [employer]. [address] [place] [address], [place] [address]. All rights reserved. This information is not intended as a substitute for professional medical care. Always follow your healthcare professional's instructions.          Putting on Compression Stockings     Turn the stocking inside-out, then fit it over your toes and heel.          Roll the stocking up your leg.            Once stockings are on, make sure the top of the stocking is about two fingers width below the crease of the knee (or the groin if you wear thigh-high stockings).          Use equipment, such as a stocking guillermo, or wear rubber gloves to make it easier to put on compression stockings.         Elastic compression stockings are prescribed to treat many vein problems. Wearing them may be the most important thing you do to manage your symptoms. The stockings fit tightly around your ankle, gradually reducing in pressure as they go up your legs. This helps keep blood flowing to your heart. As a result, swelling is reduced. Your healthcare provider will  prescribe stockings at a safe pressure for you. He or she will also tell you how often to wear and remove the stockings. Follow these instructions closely. Also, do not buy or wear compression stockings without first seeing your healthcare provider.  Tips for wear and care  To wear stockings safely and to get the most benefit:  · Wear the length prescribed by your healthcare provider.  · Pull them to the designated height and no farther. Dont let them bunch at the top. This can restrict blood flow and increase swelling.  · Wear the stockings for the amount of time your healthcare provider recommends. Replace them when they start to feel loose. This will likely be every 3 to 6 months.  · Remove them as your healthcare provider directs. When removed, wash your legs. Then check your legs and feet for sores. Call your healthcare provider if you find a sore. Dont put the stockings back on unless your healthcare provider directs.   · Wash the stockings as instructed. They may need to be hand-washed.  Date Last Reviewed: 5/1/2016 © 2000-2017 [employer]. [address] [place] [address]. All rights reserved. This information is not intended as a substitute for professional medical care. Always follow your healthcare professional's instructions.        Tips for Using Less Salt    Most people with heart problems need to eat less salt (sodium). Reducing the amount of salt you eat may help control your blood pressure. The higher your blood pressure, the greater your risk for heart disease, stroke, blindness, and kidney problems.  At the store  · Make low-salt choices by reading labels carefully. Look for the total amount of sodium per serving.  · Use more fresh food. Buy more fruits and vegetables. Select lean meats, fish, and poultry.  · Use fewer frozen, canned, and packaged foods which often contain a lot of sodium.  · Use plain frozen vegetables without sauces or toppings. These products are often  low- or no-sodium.  · Opt for reduced-sodium or no-salt-added versions of canned vegetables and soups.  In the kitchen  · Don't add salt to food when you're cooking. Season with flavorings such as onion, garlic, pepper, salt-free herbal blends, and lemon or lime juice.  · Use a cookbook containing low-salt recipes. It can give you ideas for tasty meals that are healthy for your heart.  · Sprinkle salt-free herbal blends on vegetables and meat.  · Drain and rinse canned foods, such as canned beans and vegetables, before cooking or eating.  Eating out  · Tell the  you're on a low-salt diet. Ask questions about the menu.  · Order fish, chicken, and meat broiled, baked, poached, or grilled without salt, butter, or breading.  · Use lemon, pepper, and salt-free herb mixes to add flavor.  · Choose plain steamed rice, boiled noodles, and baked or boiled potatoes. Top potatoes with chives and a little sour cream.     Beware! Salt goes by many other names. Limit foods with these words listed as ingredients: salt, sodium, soy sauce, baking soda, baking powder, MSG, monosodium, Na (the chemical symbol for sodium). Some antacids are also high in salt.   Date Last Reviewed: 6/19/2015  © 1076-2913 [employer]. [address] [place] [address]. All rights reserved. This information is not intended as a substitute for professional medical care. Always follow your healthcare professional's instructions.        Low-Salt Diet  This diet removes foods that are high in salt. It also limits the amount of salt you use when cooking. It is most often used for people with high blood pressure, edema (fluid retention), and kidney, liver, or heart disease.  Table salt contains the mineral sodium. Your body needs sodium to work normally. But too much sodium can make your health problems worse. Your healthcare provider is recommending a low-salt (also called low-sodium) diet for you. Your total daily allowance of salt  is 1,500 to 2,300 milligrams (mg). It is less than 1 teaspoon of table salt. This means you can have only about 500 to 700 mg of sodium at each meal. People with certain health problems should limit salt intake to the lower end of the recommended range.    When you cook, dont add much salt. If you can cook without using salt, even better. Dont add salt to your food at the table.  When shopping, read food labels. Salt is often called sodium on the label. Choose foods that are salt-free, low salt, or very low salt. Note that foods with reduced salt may not lower your salt intake enough.    Beans, potatoes, and pasta  Ok: Dry beans, split peas, lentils, potatoes, rice, macaroni, pasta, spaghetti without added salt  Avoid: Potato chips, tortilla chips, and similar products  Breads and cereals  Ok: Low-sodium breads, rolls, cereals, and cakes; low-salt crackers, matzo crackers  Avoid: Salted crackers, pretzels, popcorn, Hungarian toast, pancakes, muffins  Dairy  Ok: Milk, chocolate milk, hot chocolate mix, low-salt cheeses, and yogurt  Avoid: Processed cheese and cheese spreads; Roquefort, Camembert, and cottage cheese; buttermilk, instant breakfast drink  Desserts  Ok: Ice cream, frozen yogurt, juice bars, gelatin, cookies and pies, sugar, honey, jelly, hard candy  Avoid: Most pies, cakes and cookies prepared or processed with salt; instant pudding  Drinks  Ok: Tea, coffee, fizzy (carbonated) drinks, juices  Avoid: Flavored coffees, electrolyte replacement drinks, sports drinks  Meats  Ok: All fresh meat, fish, poultry, low-salt tuna, eggs, egg substitute  Avoid: Smoked, pickled, brine-cured, or salted meats and fish. This includes gillis, chipped beef, corned beef, hot dogs, deli meats, ham, kosher meats, salt pork, sausage, canned tuna, salted codfish, smoked salmon, herring, sardines, or anchovies.  Seasonings and spices  Ok: Most seasonings are okay. Good substitutes for salt include: fresh herb blends, hot sauce,  lemon, garlic, contreras, vinegar, dry mustard, parsley, cilantro, horseradish, tomato paste, regular margarine, mayonnaise, unsalted butter, cream cheese, vegetable oil, cream, low-salt salad dressing and gravy.  Avoid: Regular ketchup, relishes, pickles, soy sauce, teriyaki sauce, Worcestershire sauce, BBQ sauce, tartar sauce, meat tenderizer, chili sauce, regular gravy, regular salad dressing, salted butter  Soups  Ok: Low-salt soups and broths made with allowed foods  Avoid: Bouillon cubes, soups with smoked or salted meats, regular soup and broth  Vegetables  Ok: Most vegetables are okay; also low-salt tomato and vegetable juices  Avoid: Sauerkraut and other brine-soaked vegetables; pickles and other pickled vegetables; tomato juice, olives  Date Last Reviewed: 8/1/2016 © 2000-2017 [employer]. [address] [place] [address]. All rights reserved. This information is not intended as a substitute for professional medical care. Always follow your healthcare professional's instructions.        Low-Salt Choices  Eating salt (sodium) can make your body retain too much water. Excess water makes your heart work harder. Canned, packaged, and frozen foods are easy to prepare, but they are often high in sodium. Here are some ideas for low-salt foods you can easily prepare yourself.    For breakfast  · Fruit or 100% fruit juice  · Whole-wheat bread or an English muffin. Compare sodium content on labels.  · Low-fat milk or yogurt  · Unsalted eggs  · Shredded wheat  · Corn tortillas  · Unsalted steamed rice  · Regular (not instant) hot cereal, made without salt  Stay away from:  · Sausage, gillis, and ham  · Flour tortillas  · Packaged muffins, pancakes, and biscuits  · Instant hot cereals  · Cottage cheese  For lunch and dinner  · Fresh fish, chicken, turkey, or meat--baked, broiled, or roasted without salt  · Dry beans, cooked without salt  · Tofu, stir-fried without salt  · Unsalted fresh fruit and  vegetables, or frozen or canned fruit and vegetables with no added salt  Stay away from:  · Lunch or deli meat that is cured or smoked  · Cheese  · Tomato juice and catsup  · Canned vegetables, soups, and fish not labeled as no-salt-added or reduced sodium  · Packaged gravies and sauces  · Olives, pickles, and relish  · Bottled salad dressings  For snacks and desserts  · Yogurt  · Unsalted, air popped popcorn  · Unsalted nuts or seeds  Stay away from:  · Pies and cakes  · Packaged dessert mixes  · Pizza  · Canned and packaged puddings  · Pretzels, chips, crackers, and nuts--unless the label says unsalted  Date Last Reviewed: 6/17/2015  © 5662-5091 [employer]. [address] [place] [address]. All rights reserved. This information is not intended as a substitute for professional medical care. Always follow your healthcare professional's instructions.          Peripheral Artery Disease (PAD)     Peripheral artery disease (PAD) occurs when the arteries that carry blood to the limbs are narrowed or blocked. This is usually due to a buildup of a fatty substance called plaque in the walls of the arteries.  PAD most often affects the arteries in the legs. When these arteries are narrowed or blocked, blood flow to the legs is reduced. This can cause leg and foot pain and other symptoms. If severe enough, reduced blood flow to the legs can lead to tissue death (gangrene) and the loss of a toe, foot, or leg. Having PAD also makes it more likely that arteries in other body areas are blocked. For instance, arteries that carry blood to the heart or brain may be affected. This raises the chances of heart attack and stroke.  Risk factors  Certain factors can make PAD more likely. They include:  · Smoking  · Diabetes  · High blood pressure  · Unhealthy cholesterol levels  · Obesity  · Inactive lifestyle  · Older age  · Family history of PAD  Symptoms  Many people with PAD have no symptoms. If symptoms do  occur, they can include:  · Pain in the muscles of the calves, thighs or hips that gets worse with activity and better with rest (intermittent claudication)  · Achy, tired, or heavy feeling in the legs  · Weakness, numbness, tingling, or loss of feeling in the legs  · Changes in skin color of the legs  · Sores on the legs and feet  · Cold leg, feet, or toes  · Pain the feet or toes even when lying down (rest pain)  Home care  PAD is a chronic (lifelong) condition. Treatment is focused on managing your condition and lowering your health risks. This may include doing the following:  · If you smoke, quit. This helps prevent further damage to your arteries and lowers your health risks. Ask your provider about medicines or products that can help you quit smoking. Also consider joining a stop-smoking program or support group.  · Be more active. This helps you lose weight and manage problems such as high blood pressure and unhealthy cholesterol levels. Start a walking program if advised to by your provider. Your provider may also help you form a safe exercise program that is right for your needs.  · Make healthy eating changes. This includes eating less fat, salt, and sugar.  · Take medicines for high blood pressure, unhealthy cholesterol levels, and diabetes as directed.  · Have your blood pressure and cholesterol levels checked as often as directed.  · If you have diabetes, try to keep your blood sugar well controlled. Test your blood sugar as directed.  · If you are overweight, talk to your provider about forming a weight-loss plan.  · Watch for cuts, scrapes, or open sores on your feet. Poor blood flow to the feet may slow healing and increase the risk of infection from these problems.   Follow-up care  Follow up with your healthcare provider, or as advised. If imaging tests such as ultrasound were done, they will be reviewed by a doctor. You will be told the results and any new findings that may affect your  care.  When to seek medical advice   Call your healthcare provider right away if any of these occur:  · Sudden severe pain in the legs or feet  · Sudden cold, pale or blue color in the legs or feet  · Weakness or numbness in the legs or feet that worsens  · Any sore or wound in the legs or feet that wont heal  · Weak pulse in your legs or feet  Know the Signs of Heart Attack and Stroke  People with PAD are at high risk for heart attack and stroke. Knowing the signs of these problems can help you protect your health and get help when you need it. Call 911 right away if you have any of the following:  Signs of a Heart Attack  · Chest discomfort, such as pain, aching, tightness, or pressure that lasts more than a few minutes, or that comes and goes  · Pain or discomfort in the arms, back, shoulders, neck, or jaw  · Shortness of breath  · Sweating (often a cold, clammy sweat)  · Nausea  · Lightheadedness  Signs of a Stroke  · Sudden numbness or weakness of the face, arms, or legs, especially on one side  · Sudden confusion or trouble speaking or understanding  · Sudden trouble seeing in one or both eyes  · Sudden trouble walking, dizziness, or loss of balance  · Sudden, severe headache with no known cause   Date Last Reviewed: 9/21/2015  © 6891-3329 [email or web address]. [address] [place] [address], [place] [address]. All rights reserved. This information is not intended as a substitute for professional medical care. Always follow your healthcare professional's instructions.

## 2020-09-28 ENCOUNTER — HOSPITAL ENCOUNTER (OUTPATIENT)
Dept: CARDIOLOGY | Facility: CLINIC | Age: 75
Discharge: HOME | End: 2020-09-28
Payer: MEDICARE

## 2020-09-28 ENCOUNTER — OFFICE VISIT (OUTPATIENT)
Dept: ELECTROPHYSIOLOGY | Facility: CLINIC | Age: 75
End: 2020-09-28
Payer: MEDICARE

## 2020-09-28 ENCOUNTER — OFFICE VISIT (OUTPATIENT)
Dept: DERMATOLOGY | Facility: CLINIC | Age: 75
End: 2020-09-28
Payer: MEDICARE

## 2020-09-28 VITALS
HEART RATE: 88 BPM | WEIGHT: 315 LBS | HEIGHT: 73 IN | BODY MASS INDEX: 41.75 KG/M2 | DIASTOLIC BLOOD PRESSURE: 58 MMHG | SYSTOLIC BLOOD PRESSURE: 122 MMHG

## 2020-09-28 DIAGNOSIS — G47.33 OBSTRUCTIVE SLEEP APNEA: ICD-10-CM

## 2020-09-28 DIAGNOSIS — Z98.890 HISTORY OF LOOP RECORDER: ICD-10-CM

## 2020-09-28 DIAGNOSIS — I49.8 OTHER SPECIFIED CARDIAC ARRHYTHMIAS: ICD-10-CM

## 2020-09-28 DIAGNOSIS — D48.5 NEOPLASM OF UNCERTAIN BEHAVIOR OF SKIN: Primary | ICD-10-CM

## 2020-09-28 DIAGNOSIS — L57.0 AK (ACTINIC KERATOSIS): ICD-10-CM

## 2020-09-28 DIAGNOSIS — I10 ESSENTIAL HYPERTENSION: ICD-10-CM

## 2020-09-28 DIAGNOSIS — L30.0 NUMMULAR ECZEMA: ICD-10-CM

## 2020-09-28 DIAGNOSIS — I48.0 PAROXYSMAL ATRIAL FIBRILLATION: Primary | ICD-10-CM

## 2020-09-28 DIAGNOSIS — Z85.828 PERSONAL HISTORY OF OTHER MALIGNANT NEOPLASM OF SKIN: ICD-10-CM

## 2020-09-28 PROCEDURE — 99499 UNLISTED E&M SERVICE: CPT | Mod: S$GLB,,, | Performed by: NURSE PRACTITIONER

## 2020-09-28 PROCEDURE — 1159F MED LIST DOCD IN RCRD: CPT | Mod: HCNC,S$GLB,, | Performed by: NURSE PRACTITIONER

## 2020-09-28 PROCEDURE — 1126F PR PAIN SEVERITY QUANTIFIED, NO PAIN PRESENT: ICD-10-PCS | Mod: HCNC,S$GLB,, | Performed by: NURSE PRACTITIONER

## 2020-09-28 PROCEDURE — 88305 TISSUE EXAM BY PATHOLOGIST: CPT | Mod: HCNC | Performed by: PATHOLOGY

## 2020-09-28 PROCEDURE — 1126F PR PAIN SEVERITY QUANTIFIED, NO PAIN PRESENT: ICD-10-PCS | Mod: HCNC,S$GLB,, | Performed by: DERMATOLOGY

## 2020-09-28 PROCEDURE — 1101F PT FALLS ASSESS-DOCD LE1/YR: CPT | Mod: HCNC,CPTII,S$GLB, | Performed by: NURSE PRACTITIONER

## 2020-09-28 PROCEDURE — 3074F SYST BP LT 130 MM HG: CPT | Mod: HCNC,CPTII,S$GLB, | Performed by: DERMATOLOGY

## 2020-09-28 PROCEDURE — 1126F AMNT PAIN NOTED NONE PRSNT: CPT | Mod: HCNC,S$GLB,, | Performed by: DERMATOLOGY

## 2020-09-28 PROCEDURE — 99214 OFFICE O/P EST MOD 30 MIN: CPT | Mod: HCNC,S$GLB,, | Performed by: NURSE PRACTITIONER

## 2020-09-28 PROCEDURE — 3078F DIAST BP <80 MM HG: CPT | Mod: HCNC,CPTII,S$GLB, | Performed by: NURSE PRACTITIONER

## 2020-09-28 PROCEDURE — 99999 PR PBB SHADOW E&M-EST. PATIENT-LVL IV: CPT | Mod: PBBFAC,HCNC,, | Performed by: NURSE PRACTITIONER

## 2020-09-28 PROCEDURE — 93005 RHYTHM STRIP: ICD-10-PCS | Mod: HCNC,S$GLB,, | Performed by: INTERNAL MEDICINE

## 2020-09-28 PROCEDURE — 1101F PR PT FALLS ASSESS DOC 0-1 FALLS W/OUT INJ PAST YR: ICD-10-PCS | Mod: HCNC,CPTII,S$GLB, | Performed by: NURSE PRACTITIONER

## 2020-09-28 PROCEDURE — 88305 TISSUE EXAM BY PATHOLOGIST: CPT | Mod: 26,HCNC,, | Performed by: PATHOLOGY

## 2020-09-28 PROCEDURE — 99999 PR PBB SHADOW E&M-EST. PATIENT-LVL IV: CPT | Mod: PBBFAC,HCNC,, | Performed by: DERMATOLOGY

## 2020-09-28 PROCEDURE — 99999 PR PBB SHADOW E&M-EST. PATIENT-LVL IV: ICD-10-PCS | Mod: PBBFAC,HCNC,, | Performed by: DERMATOLOGY

## 2020-09-28 PROCEDURE — 3074F SYST BP LT 130 MM HG: CPT | Mod: HCNC,CPTII,S$GLB, | Performed by: NURSE PRACTITIONER

## 2020-09-28 PROCEDURE — 1159F PR MEDICATION LIST DOCUMENTED IN MEDICAL RECORD: ICD-10-PCS | Mod: HCNC,S$GLB,, | Performed by: NURSE PRACTITIONER

## 2020-09-28 PROCEDURE — 1159F PR MEDICATION LIST DOCUMENTED IN MEDICAL RECORD: ICD-10-PCS | Mod: HCNC,S$GLB,, | Performed by: DERMATOLOGY

## 2020-09-28 PROCEDURE — 93005 ELECTROCARDIOGRAM TRACING: CPT | Mod: HCNC,S$GLB,, | Performed by: INTERNAL MEDICINE

## 2020-09-28 PROCEDURE — 3078F DIAST BP <80 MM HG: CPT | Mod: HCNC,CPTII,S$GLB, | Performed by: DERMATOLOGY

## 2020-09-28 PROCEDURE — 93010 RHYTHM STRIP: ICD-10-PCS | Mod: HCNC,S$GLB,, | Performed by: INTERNAL MEDICINE

## 2020-09-28 PROCEDURE — 3008F BODY MASS INDEX DOCD: CPT | Mod: HCNC,CPTII,S$GLB, | Performed by: NURSE PRACTITIONER

## 2020-09-28 PROCEDURE — 11102 PR TANGENTIAL BIOPSY, SKIN, SINGLE LESION: ICD-10-PCS | Mod: HCNC,S$GLB,, | Performed by: DERMATOLOGY

## 2020-09-28 PROCEDURE — 3008F PR BODY MASS INDEX (BMI) DOCUMENTED: ICD-10-PCS | Mod: HCNC,CPTII,S$GLB, | Performed by: NURSE PRACTITIONER

## 2020-09-28 PROCEDURE — 1159F MED LIST DOCD IN RCRD: CPT | Mod: HCNC,S$GLB,, | Performed by: DERMATOLOGY

## 2020-09-28 PROCEDURE — 1101F PT FALLS ASSESS-DOCD LE1/YR: CPT | Mod: HCNC,CPTII,S$GLB, | Performed by: DERMATOLOGY

## 2020-09-28 PROCEDURE — 3078F PR MOST RECENT DIASTOLIC BLOOD PRESSURE < 80 MM HG: ICD-10-PCS | Mod: HCNC,CPTII,S$GLB, | Performed by: NURSE PRACTITIONER

## 2020-09-28 PROCEDURE — 93010 ELECTROCARDIOGRAM REPORT: CPT | Mod: HCNC,S$GLB,, | Performed by: INTERNAL MEDICINE

## 2020-09-28 PROCEDURE — 1126F AMNT PAIN NOTED NONE PRSNT: CPT | Mod: HCNC,S$GLB,, | Performed by: NURSE PRACTITIONER

## 2020-09-28 PROCEDURE — 1101F PR PT FALLS ASSESS DOC 0-1 FALLS W/OUT INJ PAST YR: ICD-10-PCS | Mod: HCNC,CPTII,S$GLB, | Performed by: DERMATOLOGY

## 2020-09-28 PROCEDURE — 99214 PR OFFICE/OUTPT VISIT, EST, LEVL IV, 30-39 MIN: ICD-10-PCS | Mod: HCNC,S$GLB,, | Performed by: NURSE PRACTITIONER

## 2020-09-28 PROCEDURE — 99214 PR OFFICE/OUTPT VISIT, EST, LEVL IV, 30-39 MIN: ICD-10-PCS | Mod: 25,HCNC,S$GLB, | Performed by: DERMATOLOGY

## 2020-09-28 PROCEDURE — 17000 DESTRUCT PREMALG LESION: CPT | Mod: 59,HCNC,S$GLB, | Performed by: DERMATOLOGY

## 2020-09-28 PROCEDURE — 99499 RISK ADDL DX/OHS AUDIT: ICD-10-PCS | Mod: S$GLB,,, | Performed by: NURSE PRACTITIONER

## 2020-09-28 PROCEDURE — 3074F PR MOST RECENT SYSTOLIC BLOOD PRESSURE < 130 MM HG: ICD-10-PCS | Mod: HCNC,CPTII,S$GLB, | Performed by: NURSE PRACTITIONER

## 2020-09-28 PROCEDURE — 3078F PR MOST RECENT DIASTOLIC BLOOD PRESSURE < 80 MM HG: ICD-10-PCS | Mod: HCNC,CPTII,S$GLB, | Performed by: DERMATOLOGY

## 2020-09-28 PROCEDURE — 17000 PR DESTRUCTION(LASER SURGERY,CRYOSURGERY,CHEMOSURGERY),PREMALIGNANT LESIONS,FIRST LESION: ICD-10-PCS | Mod: 59,HCNC,S$GLB, | Performed by: DERMATOLOGY

## 2020-09-28 PROCEDURE — 99999 PR PBB SHADOW E&M-EST. PATIENT-LVL IV: ICD-10-PCS | Mod: PBBFAC,HCNC,, | Performed by: NURSE PRACTITIONER

## 2020-09-28 PROCEDURE — 3074F PR MOST RECENT SYSTOLIC BLOOD PRESSURE < 130 MM HG: ICD-10-PCS | Mod: HCNC,CPTII,S$GLB, | Performed by: DERMATOLOGY

## 2020-09-28 PROCEDURE — 88305 TISSUE EXAM BY PATHOLOGIST: ICD-10-PCS | Mod: 26,HCNC,, | Performed by: PATHOLOGY

## 2020-09-28 PROCEDURE — 99214 OFFICE O/P EST MOD 30 MIN: CPT | Mod: 25,HCNC,S$GLB, | Performed by: DERMATOLOGY

## 2020-09-28 PROCEDURE — 11102 TANGNTL BX SKIN SINGLE LES: CPT | Mod: HCNC,S$GLB,, | Performed by: DERMATOLOGY

## 2020-09-28 NOTE — PATIENT INSTRUCTIONS
" Shave Biopsy Wound Care    Your doctor has performed a shave biopsy today.  A band aid and vaseline ointment has been placed over the site.  This should remain in place for 24 hours.  It is recommended that you keep the area dry for the first 24 hours.  After 24 hours, you may remove the band aid and wash the area with warm soap and water and apply Vaseline jelly.  Many patients prefer to use Neosporin or Bacitracin ointment.  This is acceptable; however, know that you can develop an allergy to this medication even if you have used it safely for years.  It is important to keep the area moist.  Letting it dry out and get air slows healing time, and will worsen the scar.  Band aid is optional after first 24 hours.      If you notice increasing redness, tenderness, pain, or yellow drainage at the biopsy site, please notify your doctor.  These are signs of an infection.    If your biopsy site is bleeding, apply firm pressure for 15 minutes straight.  Repeat for another 15 minutes, if it is still bleeding.   If the surgical site continues to bleed, then please contact your doctor.      For [facility] users:   You will receive a [facility] notification after the pathologist has finished reviewing your biopsy specimen. Pathology results, however, will not be released online so you will see a "no content" message. Once your dermatologist reviews and clinically correlates your biopsy results, you will either receive a letter in the mail with the results of a phone call from your doctor's office if further explanation or treatment is warranted.       [address] [place] [address]/ [phone] FAX/ [email or web address]      CRYOSURGERY      Your doctor has used a method called cryosurgery to treat your skin condition. Cryosurgery refers to the use of very cold substances to treat a variety of skin conditions such as warts, pre-skin cancers, molluscum contagiosum, sun spots, and several benign " growths. The substance we use in cryosurgery is liquid nitrogen and is so cold (-195 degrees Celsius) that is burns when administered.     Following treatment in the office, the skin may immediately burn and become red. You may find the area around the lesion is affected as well. It is sometimes necessary to treat not only the lesion, but a small area of the surrounding normal skin to achieve a good response.     A blister, and even a blood filled blister, may form after treatment.   This is a normal response. If the blister is painful, it is acceptable to sterilize a needle and with rubbing alcohol and gently pop the blister. It is important that you gently wash the area with soap and warm water as the blister fluid may contain wart virus if a wart was treated. Do no remove the roof of the blister.     The area treated can take anywhere from 1-3 weeks to heal. Healing time depends on the kind skin lesion treated, the location, and how aggressively the lesion was treated. It is recommended that the areas treated are covered with Vaseline or bacitracin ointment and a band-aid. If a band-aid is not practical, just ointment applied several times per day will do. Keeping these areas moist will speed the healing time.    Treatment with liquid nitrogen can leave a scar. In dark skin, it may be a light or dark scar, in light skin it may be a white or pink scar. These will generally fade with time, but may never go away completely.     If you have any concerns after your treatment, please feel free to call the office.       [facility] [place] [address]/ [phone] FAX/ [email or web address]

## 2020-09-28 NOTE — PROGRESS NOTES
Subjective:       Patient ID:  Janusz Montgomery Jr. is a 75 y.o. male who presents for   Chief Complaint   Patient presents with    Lesion     follow up efudex     History of Present Illness: The patient presents for follow up of skin check.    The patient was last seen on: 6/12/20 for BCC on left lower medial leg. Treated with efudex BID x 4 weeks, good response. He follows with wound care every Wednesday, has wound cleaned and leg re-wrapped. He also had significant lower leg swelling that has also improved. He has decreased salt intake, elevated leg as well.     Patient complains of lesion(s)  Location: left medial eyelid  Duration: x months  Symptoms: asymptomatic. Some flaking  Relieving factors/Previous treatments: hx of SCC     Also has hx of itching on back, arms, and legs. No new medications. Has been using triamcinolone 0.1% cream once every few days. He tried to use OTC cerave 2 times, felt it made itching worse.     Lesion        Review of Systems   Constitutional: Negative for fever, chills and weight loss (wt # 360# - stable).   Cardiovascular: Positive for pedal edema (chronic LE edema).   Skin: Positive for itching, rash, daily sunscreen use, activity-related sunscreen use ( rarely outdoors) and wears hat ( when outdoors for long periods of time). Negative for recent sunburn.   Hematologic/Lymphatic: Bruises/bleeds easily (takes eliquis ).        Objective:    Physical Exam   Constitutional: He appears well-developed and well-nourished. He is obese.  No distress.   HENT:   Mouth/Throat: Lips normal.    Eyes: Lids are normal.  No conjunctival no injection.   Cardiovascular: There is dependent edema (legs).     Lymphadenopathy:        Head (right side): No submental, no submandibular, no preauricular, no posterior auricular and no occipital adenopathy present.        Head (left side): No submental, no submandibular, no preauricular, no posterior auricular and no occipital adenopathy present.     He  has no axillary adenopathy.   Neurological: He is alert and oriented to person, place, and time. He is not disoriented.   Psychiatric: He has a normal mood and affect.   Skin:   Areas Examined (abnormalities noted in diagram):   Scalp / Hair Palpated and Inspected  Head / Face Inspection Performed  Back Inspection Performed  RUE Inspected  LUE Inspection Performed  RLE Inspected  LLE Inspection Performed  Nails and Digits Inspection Performed                   Diagram Legend     Erythematous scaling macule/papule c/w actinic keratosis       Vascular papule c/w angioma      Pigmented verrucoid papule/plaque c/w seborrheic keratosis      Yellow umbilicated papule c/w sebaceous hyperplasia      Irregularly shaped tan macule c/w lentigo     1-2 mm smooth white papules consistent with Milia      Movable subcutaneous cyst with punctum c/w epidermal inclusion cyst      Subcutaneous movable cyst c/w pilar cyst      Firm pink to brown papule c/w dermatofibroma      Pedunculated fleshy papule(s) c/w skin tag(s)      Evenly pigmented macule c/w junctional nevus     Mildly variegated pigmented, slightly irregular-bordered macule c/w mildly atypical nevus      Flesh colored to evenly pigmented papule c/w intradermal nevus       Pink pearly papule/plaque c/w basal cell carcinoma      Erythematous hyperkeratotic cursted plaque c/w SCC      Surgical scar with no sign of skin cancer recurrence      Open and closed comedones      Inflammatory papules and pustules      Verrucoid papule consistent consistent with wart     Erythematous eczematous patches and plaques     Dystrophic onycholytic nail with subungual debris c/w onychomycosis     Umbilicated papule    Erythematous-base heme-crusted tan verrucoid plaque consistent with inflamed seborrheic keratosis     Erythematous Silvery Scaling Plaque c/w Psoriasis     See annotation      9/23/20            Assessment / Plan:      Pathology Orders:     Normal Orders This Visit    Specimen  to Pathology, Dermatology     Questions:    Procedure Type: Dermatology and skin neoplasms    Number of Specimens: 1    ------------------------: -------------------------    Spec 1 Procedure: Biopsy    Spec 1 Clinical Impression: r/o recurrent bcc    Spec 1 Source: left medial upper eyelid canthus        Neoplasm of uncertain behavior of skin  -     Specimen to Pathology, Dermatology  -Shave biopsy procedure note:    Shave biopsy performed after verbal consent including risk of infection, scar, recurrence, need for additional treatment of site. Area prepped with alcohol, anesthetized with approximately 1.0cc of 1% lidocaine with epinephrine. Lesional tissue shaved with razor blade. Hemostasis achieved with application of aluminum chloride followed by hyfrecation. No complications. Dressing applied. Wound care explained.    If biopsy positive for malignancy, refer to Dr. Mcclure for Mohs surgery consultation.    AK (actinic keratosis)  Cryosurgery Procedure Note    Verbal consent from the patient is obtained including, but not limited to, risk of hypopigmentation/hyperpigmentation, scar, recurrence of lesion. The patient is aware of the precancerous quality and need for treatment of these lesions. Liquid nitrogen cryosurgery is applied to the 1 actinic keratoses, as detailed in the physical exam, to produce a freeze injury. The patient is aware that blisters may form and is instructed on wound care with gentle cleansing and use of vaseline ointment to keep moist until healed. The patient is supplied a handout on cryosurgery and is instructed to call if lesions do not completely resolve.    Nummular eczema  Good skin care regimen discussed including limiting to one bath or shower/day, using lukewarm water with mild soap and moisturizing cream to skin 1 - 2x/day. Brochure was provided and reviewed with patient.  cerave cream, triamcinolone 0.1% cream BID to AA.     Personal history of other malignant neoplasm of  skin  Area(s) of previous NMSC evaluated with no signs of recurrence.    Upper body skin examination performed today including at least 6 points as noted in physical examination. Suspicious lesions noted.  BCC left lower leg s/p efudex treatment BID x 4 weeks, continue wound care.             Follow up in about 3 months (around 12/28/2020).
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
## 2020-09-29 ENCOUNTER — PATIENT OUTREACH (OUTPATIENT)
Dept: OTHER | Facility: OTHER | Age: 75
End: 2020-09-29

## 2020-09-29 ENCOUNTER — TELEPHONE (OUTPATIENT)
Dept: NEPHROLOGY | Facility: CLINIC | Age: 75
End: 2020-09-29

## 2020-09-29 DIAGNOSIS — I10 HYPERTENSION, UNSPECIFIED TYPE: Primary | ICD-10-CM

## 2020-09-29 NOTE — TELEPHONE ENCOUNTER
Spoke to pt's wife and gave her serum metanephrine lab results.  Mild elevation of normetanephrine. Will repeat with fasting and abstaining from tylenol for 24 hours.  Wife verbalized understanding.

## 2020-09-30 ENCOUNTER — OFFICE VISIT (OUTPATIENT)
Dept: WOUND CARE | Facility: CLINIC | Age: 75
End: 2020-09-30
Payer: MEDICARE

## 2020-09-30 VITALS
WEIGHT: 315 LBS | SYSTOLIC BLOOD PRESSURE: 121 MMHG | DIASTOLIC BLOOD PRESSURE: 66 MMHG | BODY MASS INDEX: 41.75 KG/M2 | TEMPERATURE: 98 F | HEIGHT: 73 IN | HEART RATE: 81 BPM

## 2020-09-30 DIAGNOSIS — T81.89XA DELAYED SURGICAL WOUND HEALING, INITIAL ENCOUNTER: Primary | ICD-10-CM

## 2020-09-30 DIAGNOSIS — I83.899 VARICOSE VEINS OF LEG WITH COMPLICATIONS: ICD-10-CM

## 2020-09-30 PROCEDURE — 29580 PR STRAPPING UNNA BOOT: ICD-10-PCS | Mod: HCNC,LT,S$GLB, | Performed by: NURSE PRACTITIONER

## 2020-09-30 PROCEDURE — 99499 UNLISTED E&M SERVICE: CPT | Mod: HCNC,S$GLB,, | Performed by: NURSE PRACTITIONER

## 2020-09-30 PROCEDURE — 99499 NO LOS: ICD-10-PCS | Mod: HCNC,S$GLB,, | Performed by: NURSE PRACTITIONER

## 2020-09-30 PROCEDURE — 99999 PR PBB SHADOW E&M-EST. PATIENT-LVL V: ICD-10-PCS | Mod: PBBFAC,HCNC,, | Performed by: NURSE PRACTITIONER

## 2020-09-30 PROCEDURE — 99999 PR PBB SHADOW E&M-EST. PATIENT-LVL V: CPT | Mod: PBBFAC,HCNC,, | Performed by: NURSE PRACTITIONER

## 2020-09-30 PROCEDURE — 29580 STRAPPING UNNA BOOT: CPT | Mod: HCNC,LT,S$GLB, | Performed by: NURSE PRACTITIONER

## 2020-09-30 NOTE — PROGRESS NOTES
Subjective:       Patient ID: Janusz Montgomery Jr. is a 75 y.o. male.    Chief Complaint: Wound Check    Wound Check     75 y.o. male referred by Dr. Kumar for evaluation and management of an open wound to the left lower leg.  He was seen by Dr. Kumar on 6/12/20 at which time he presented for evaluation of his wound which had been present for the past year. He requested and underwent a biopsy of the leg wound on that date.  The biopsy was positive for BCC.  He used efudex for about a month.  A coflex compression wrap was placed on the leg on his last visit.  The wound is healing as evidenced by increased granulation and wound contracture. He has neuropathy and is on gabapentin.  He is afebrile. He denies increased redness, swelling or purulent drainage.  He does not complain of pain.     Patient Active Problem List   Diagnosis    Essential hypertension    Other hyperlipidemia    Coronary artery disease involving native coronary artery of native heart without angina pectoris s/p RCA stent    Obstructive sleep apnea    GERD (gastroesophageal reflux disease)    Benign prostatic hyperplasia with urinary obstruction    Morbid obesity with body mass index of 45.0-49.9 in adult    Prostate cancer    Paroxysmal atrial fibrillation     Major depressive disorder, recurrent episode, mild    Generalized anxiety disorder    History of gout    Herpes simplex keratoconjunctivitis    Decreased range of motion of left ankle    Right leg weakness    Impaired mobility    Balance problems    PVD (peripheral vascular disease)    Bilateral leg edema    Bilateral carotid artery stenosis    Gait abnormality    Left leg weakness    Delayed sleep phase syndrome    Floppy eyelid syndrome    Dyspnea    Insomnia    Restrictive lung disease    Personal history of asbestosis    CAD (coronary artery disease)    Varicose veins of leg with complications    Delayed surgical wound healing    Venous stasis  "dermatitis of both lower extremities    Acute kidney injury superimposed on chronic kidney disease    CKD (chronic kidney disease) stage 3, GFR 30-59 ml/min    Hypotension    History of loop recorder     Past Medical History:   Diagnosis Date    NAT (acute kidney injury) 3/19/2017    ALLERGIC RHINITIS     Anemia     Anxiety     Basal cell carcinoma 10/19/2018    forehead and right medial shoulder    Basal cell carcinoma 01/09/2019    left nasal bridge and left posterior ear    Basal cell carcinoma 06/12/2020    left lower medial leg - Efudex    Chronic rhinitis 5/3/2013    Chronic rhinitis 5/3/2013    Coronary artery disease involving native coronary artery of native heart without angina pectoris s/p RCA stent     Cortical cataract of both eyes 3/18/2016    Delayed sleep phase syndrome 3/13/2019    Depression     Erectile dysfunction 3/24/2014    Erectile dysfunction 3/24/2014    Essential hypertension     GERD (gastroesophageal reflux disease) 7/25/2012    Gout, arthritis     Grade III open fracture of left tibia and fibula s/p ex-fix on 7/1/16 and ORIF of left tibia on 7/15 7/6/2016    H/O: iritis     Helicobacter pylori (H. pylori) infection     Treated    Herpes simplex keratoconjunctivitis 9/30/2015    - on acyclovir - followed by opthalmology, Dr. Uribe     Herpes simplex keratoconjunctivitis 9/30/2015    - on acyclovir - followed by opthalmology, Dr. Uribe     Hyperkalemia 2/28/2017    Hyperlipidemia     Hypogonadism male     Hypogonadism male     Mixed anxiety and depressive disorder     Morbid obesity     Obstructive sleep apnea on CPAP     CPAP    Osteoarthritis of left knee 7/25/2012    Paroxysmal atrial fibrillation 7/6/2016    Primary osteoarthritis of left knee 7/25/2012    Prominent aorta 1/25/2016    "RESULTS: THE HEART IS MILDLY ENLARGED WITH A SLIGHTLY PROMINENT AORTA" - Xray Chest PA & Lateral 12-     Prostate cancer 2/15/2016    - followed by " "urology, Dr. Young     Prostate cancer 2/15/2016    - followed by urology, Dr. 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 Conteh    CATARACT EXTRACTION W/  INTRAOCULAR LENS IMPLANT Left 4/12/2016        COLONOSCOPY N/A 7/23/2020    Procedure: COLONOSCOPY;  Surgeon: Nico Lackey MD;  Location: [facility] ([address] [place] [address]);  Service: Endoscopy;  Laterality: N/A;    ESOPHAGOGASTRODUODENOSCOPY N/A 7/23/2020    Procedure: EGD (ESOPHAGOGASTRODUODENOSCOPY);  Surgeon: Nico Lackey MD;  Location: [facility] ([address] [place] [address]);  Service: Endoscopy;  Laterality: N/A;  per Dr Lackey-Will proceed with EGD and colonoscopy on the 2nd floor due to his comorbidities including obesity, sleep apnea, restrictive lung disease, coronary artery disease.  has loop recorder      ok to hold Eliquis 2 days per Dr Sandhu-must remain    EXTERNAL FIXATION TIBIAL FRACTURE Left 07/01/2016    INSERTION OF IMPLANTABLE LOOP RECORDER  04/07/2017    LEFT HEART CATHETERIZATION Left 1/30/2020    Procedure: Left heart cath;  Surgeon: Benjamin Sandhu MD;  Location: [facility] CATH LAB;  Service: Cardiology;  Laterality: Left;  RN PREOP 1/28/20--Pt starting Plavix loading dose today (8pills)- Dr. Sandhu aware.  Pt has a bandaged "non healing area to LLE"--Dr. Sandhu aware.    LEFT HEART CATHETERIZATION Left 2/14/2020    Procedure: Left heart cath, IVUS guided left main / LAD PCI. Noon " start, radial approach;  Surgeon: Miguel Angel Brady MD;  Location: [facility] CATH LAB;  Service: Cardiology;  Laterality: Left;  RN PRE OP 2-7-2020  BMI--45.61    ORIF TIBIA FRACTURE Left 07/15/2016    RADIOACTIVE SEED IMPLANTATION OF PROSTATE N/A 8/8/2018    Procedure: INSERTION, RADIOACTIVE SEED, PROSTATE;  Surgeon: Bipin Thompson MD;  Location: [facility] OR [address] [place] [address];  Service: Urology;  Laterality: N/A;  1 hour    Squamous cell cancer removal x3 with Mohs surgery      TONSILLECTOMY      TOTAL KNEE ARTHROPLASTY  10/2012    trus/bx      ULTRASOUND GUIDANCE  2/14/2020    Procedure: Ultrasound Guidance;  Surgeon: Miguel Angel Brady MD;  Location: [facility] CATH LAB;  Service: Cardiology;;     Review of Systems      Unchanged from previous visit.  Objective:      Physical Exam  Vitals signs and nursing note reviewed.   Constitutional:       General: He is not in acute distress.     Appearance: He is well-developed. He is not diaphoretic.   HENT:      Head: Normocephalic and atraumatic.   Pulmonary:      Effort: Pulmonary effort is normal. No respiratory distress.   Musculoskeletal: Normal range of motion.         General: No tenderness.        Legs:    Skin:     General: Skin is warm and dry.      Findings: No erythema or rash.   Neurological:      Mental Status: He is alert and oriented to person, place, and time.   Psychiatric:         Behavior: Behavior normal.         Thought Content: Thought content normal.         Judgment: Judgment normal.               A vascular lab study performed on 9/18/20 revealed right leg segmental pressures and PVR waveforms revealed minimal to mild PAOD.  Left leg segmental pressures and PVR waveforms revealed mild to moderate PAOD.    Janusz was seen in the clinic room and placed in the supine position on the treatment table.  The left leg was cleansed with Easi-clense sponges and dried thoroughly.  Eucerin cream was applied to the lower legs. Calmoseptine was applied to the periwound area. Santyl  and a hydrofiber dressing was applied to the wound and covered with an ABD pad.  The patient's foot was positioned at a 90 degree angle.  The coflex with calamine was applied per package instructions.  A two layered application was performed using a spiral technique beginning with the foam layer followed by the cohesive bandage avoiding creases or folds.  The wrap was started behind the first metatarsal and ended below the tibial tubercle of the knee.  There was overlap of each turn half the width of the previous turn.  The compression wrap will be changed every 7 days.    Assessment:       1. Delayed surgical wound healing, initial encounter    2. Varicose veins of leg with complications               Plan:          Benedryl 50 mg every 4 hours as needed for itching.  Triamncinolone cream to affected area on legs.  Coflex compression wrap with calamine left lower leg as detailed above.  Patient was warned not to get the dressings wet and to use cast covers for showering.  Should the dressing become wet, he is to remove it, place a wet-to-dry dressing over the wound, cover with gauze and roll gauze and use ace wraps for compression and to secure bandages.  He should then notify this office as soon as possible to have a new dressing applied.  Return to clinic in one week.

## 2020-10-01 ENCOUNTER — IMMUNIZATION (OUTPATIENT)
Dept: PHARMACY | Facility: CLINIC | Age: 75
End: 2020-10-01

## 2020-10-01 ENCOUNTER — IMMUNIZATION (OUTPATIENT)
Dept: PHARMACY | Facility: CLINIC | Age: 75
End: 2020-10-01
Payer: MEDICARE

## 2020-10-02 LAB
FINAL PATHOLOGIC DIAGNOSIS: NORMAL
GROSS: NORMAL

## 2020-10-05 ENCOUNTER — CLINICAL SUPPORT (OUTPATIENT)
Dept: CARDIOLOGY | Facility: HOSPITAL | Age: 75
End: 2020-10-05
Payer: MEDICARE

## 2020-10-05 DIAGNOSIS — Z95.818 PRESENCE OF OTHER CARDIAC IMPLANTS AND GRAFTS: ICD-10-CM

## 2020-10-05 PROCEDURE — 93298 REM INTERROG DEV EVAL SCRMS: CPT | Mod: HCNC,,, | Performed by: INTERNAL MEDICINE

## 2020-10-05 PROCEDURE — 93298 CARDIAC DEVICE CHECK - REMOTE: ICD-10-PCS | Mod: HCNC,,, | Performed by: INTERNAL MEDICINE

## 2020-10-05 PROCEDURE — G2066 INTER DEVC REMOTE 30D: HCPCS | Mod: HCNC | Performed by: INTERNAL MEDICINE

## 2020-10-06 ENCOUNTER — TELEPHONE (OUTPATIENT)
Dept: CARDIOLOGY | Facility: CLINIC | Age: 75
End: 2020-10-06

## 2020-10-06 ENCOUNTER — OFFICE VISIT (OUTPATIENT)
Dept: ORTHOPEDICS | Facility: CLINIC | Age: 75
End: 2020-10-06
Payer: MEDICARE

## 2020-10-06 DIAGNOSIS — M65.311 TRIGGER FINGER OF RIGHT THUMB: Primary | ICD-10-CM

## 2020-10-06 DIAGNOSIS — Z01.818 PREOP TESTING: Primary | ICD-10-CM

## 2020-10-06 DIAGNOSIS — M65.311 TRIGGER THUMB, RIGHT THUMB: ICD-10-CM

## 2020-10-06 PROCEDURE — 1126F PR PAIN SEVERITY QUANTIFIED, NO PAIN PRESENT: ICD-10-PCS | Mod: HCNC,S$GLB,, | Performed by: ORTHOPAEDIC SURGERY

## 2020-10-06 PROCEDURE — 99999 PR PBB SHADOW E&M-EST. PATIENT-LVL V: ICD-10-PCS | Mod: PBBFAC,HCNC,, | Performed by: ORTHOPAEDIC SURGERY

## 2020-10-06 PROCEDURE — 1101F PR PT FALLS ASSESS DOC 0-1 FALLS W/OUT INJ PAST YR: ICD-10-PCS | Mod: HCNC,CPTII,S$GLB, | Performed by: ORTHOPAEDIC SURGERY

## 2020-10-06 PROCEDURE — 99214 PR OFFICE/OUTPT VISIT, EST, LEVL IV, 30-39 MIN: ICD-10-PCS | Mod: HCNC,S$GLB,, | Performed by: ORTHOPAEDIC SURGERY

## 2020-10-06 PROCEDURE — 1159F PR MEDICATION LIST DOCUMENTED IN MEDICAL RECORD: ICD-10-PCS | Mod: HCNC,S$GLB,, | Performed by: ORTHOPAEDIC SURGERY

## 2020-10-06 PROCEDURE — 1126F AMNT PAIN NOTED NONE PRSNT: CPT | Mod: HCNC,S$GLB,, | Performed by: ORTHOPAEDIC SURGERY

## 2020-10-06 PROCEDURE — 99214 OFFICE O/P EST MOD 30 MIN: CPT | Mod: HCNC,S$GLB,, | Performed by: ORTHOPAEDIC SURGERY

## 2020-10-06 PROCEDURE — 1101F PT FALLS ASSESS-DOCD LE1/YR: CPT | Mod: HCNC,CPTII,S$GLB, | Performed by: ORTHOPAEDIC SURGERY

## 2020-10-06 PROCEDURE — 99999 PR PBB SHADOW E&M-EST. PATIENT-LVL V: CPT | Mod: PBBFAC,HCNC,, | Performed by: ORTHOPAEDIC SURGERY

## 2020-10-06 PROCEDURE — 1159F MED LIST DOCD IN RCRD: CPT | Mod: HCNC,S$GLB,, | Performed by: ORTHOPAEDIC SURGERY

## 2020-10-06 RX ORDER — MUPIROCIN 20 MG/G
OINTMENT TOPICAL
Status: CANCELLED | OUTPATIENT
Start: 2020-10-06

## 2020-10-06 RX ORDER — SODIUM CHLORIDE 9 MG/ML
INJECTION, SOLUTION INTRAVENOUS CONTINUOUS
Status: CANCELLED | OUTPATIENT
Start: 2020-10-06

## 2020-10-06 NOTE — TELEPHONE ENCOUNTER
----- Message from Nathaly Kraus MA sent at 10/6/2020  3:48 PM CDT -----  Regarding: FW: Surgical Clearance    ----- Message -----  From: Noemy Michaels  Sent: 10/6/2020   3:42 PM CDT  To: [facility] Staff  Subject: Surgical Clearance                               Dr. Sandhu,    The attached patient is shared by you and Dr. Swift.     The patient is scheduled for trigger thumb release surgery on October 14, 2020.     Medical clearance is indicated prior to this, specifically from a cardiology standpoint. Would you be able to help the patient in that regard? The anesthesia team will require documentation in the chart regarding clearance status and perioperative medical recommendations, notably holding Eliquis for 5 days preoperatively.     Thank you for your help & let me know if I can assist in any way!    Noemy Michaels MS, OTC  Clinical Assistant to Dr. Rad Swift

## 2020-10-06 NOTE — PROGRESS NOTES
[facility]  History & Physical  Orthopedics    SUBJECTIVE:      COVID-19 attestation:  This patient was treated during the COVID-19 pandemic.  This was discussed with the patient, they are aware of our current policies and procedures, were given the option of delaying their visit and or switching to a virtual visit, delaying their surgery when applicable, and they elect to proceed.    Chief Complaint: right thumb triggering    Referring Provider: No ref. provider found     History of Present Illness:  Patient is a 75 y.o. right hand dominant male who presents today with complaints of right thumb triggering. Starting 3 months ago and has gotten worse. Pain over volar A1 pulley of thumb without radiation. Denies any pain in IPJ of thumb. Mechanical symptoms that require manual unlocking. No previous injuries or surgeries to RUE. Denies any numbness/tingling. Has not tried any treatments other than tylenol for his leg pain that helps mildly.     Interval interval history October 6, 2020:  The patient returns today for re-evaluation of his right thumb.  He notes that the prior A1 pulley injection did not help to any significant extent and that he still has significantly symptomatic triggering and pain at the A1 pulley.  He would like to discuss surgical options after a.    The patient is a/an retiree.    Onset of symptoms/DOI was 3 months.    Symptoms are aggravated by activity and movement.    Symptoms are alleviated by rest.    Symptoms consist of pain and decreased ROM.    The patient rates their pain as a 5/10.    Attempted treatment(s) and/or interventions include rest.     The patient denies any fevers, chills, N/V, D/C and presents for evaluation.       Past Medical History:   Diagnosis Date    NAT (acute kidney injury) 3/19/2017    ALLERGIC RHINITIS     Anemia     Anxiety     Basal cell carcinoma 10/19/2018    forehead and right medial shoulder    Basal cell carcinoma 01/09/2019     "left nasal bridge and left posterior ear    Basal cell carcinoma 06/12/2020    left lower medial leg - Efudex    Chronic rhinitis 5/3/2013    Chronic rhinitis 5/3/2013    Coronary artery disease involving native coronary artery of native heart without angina pectoris s/p RCA stent     Cortical cataract of both eyes 3/18/2016    Delayed sleep phase syndrome 3/13/2019    Depression     Erectile dysfunction 3/24/2014    Erectile dysfunction 3/24/2014    Essential hypertension     GERD (gastroesophageal reflux disease) 7/25/2012    Gout, arthritis     Grade III open fracture of left tibia and fibula s/p ex-fix on 7/1/16 and ORIF of left tibia on 7/15 7/6/2016    H/O: iritis     Helicobacter pylori (H. pylori) infection     Treated    Herpes simplex keratoconjunctivitis 9/30/2015    - on acyclovir - followed by opthalmology, Dr. Uribe     Herpes simplex keratoconjunctivitis 9/30/2015    - on acyclovir - followed by opthalmology, Dr. Uribe     Hyperkalemia 2/28/2017    Hyperlipidemia     Hypogonadism male     Hypogonadism male     Mixed anxiety and depressive disorder     Morbid obesity     Obstructive sleep apnea on CPAP     CPAP    Osteoarthritis of left knee 7/25/2012    Paroxysmal atrial fibrillation 7/6/2016    Primary osteoarthritis of left knee 7/25/2012    Prominent aorta 1/25/2016    "RESULTS: THE HEART IS MILDLY ENLARGED WITH A SLIGHTLY PROMINENT AORTA" - Xray Chest PA & Lateral 12-     Prostate cancer 2/15/2016    - followed by urology, Dr. Young     Prostate cancer 2/15/2016    - followed by urology, Dr. 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 Conteh    CATARACT EXTRACTION W/  INTRAOCULAR LENS IMPLANT Left 4/12/2016        COLONOSCOPY N/A 7/23/2020    Procedure: COLONOSCOPY;  Surgeon: Nico Lackey MD;  Location: [facility] (2ND FLR);  Service: Endoscopy;  Laterality: N/A;    ESOPHAGOGASTRODUODENOSCOPY N/A 7/23/2020    Procedure: EGD (ESOPHAGOGASTRODUODENOSCOPY);  Surgeon: Nico Lackey MD;  [facility]  Service: Endoscopy;  Laterality: N/A;  per Dr Lackey-Will proceed with EGD and colonoscopy on the 2nd floor due to his comorbidities including obesity, sleep apnea, restrictive lung disease, coronary artery disease.  has loop recorder      ok to hold Eliquis 2 days per Dr Sandhu-must remain    EXTERNAL FIXATION TIBIAL FRACTURE Left 07/01/2016    INSERTION OF IMPLANTABLE LOOP RECORDER  04/07/2017    LEFT HEART CATHETERIZATION Left 1/30/2020    Procedure: Left heart cath;  Surgeon: Benjamin Sandhu MD;  Location: [facility] CATH LAB;  Service: Cardiology;  Laterality: Left;  RN PREOP 1/28/20--Pt starting Plavix loading dose today (8pills)- Dr. Sandhu aware.  Pt has a bandaged "non healing area to LLE"--Dr. Sandhu aware.    LEFT HEART CATHETERIZATION Left 2/14/2020    Procedure: Left heart cath, IVUS guided left main / LAD PCI. Noon start, radial approach;  Surgeon: Miguel Angel Brady MD;  Location: [facility] CATH LAB;  Service: Cardiology;  Laterality: Left;  RN PRE OP 2-7-2020  BMI--45.61    ORIF TIBIA FRACTURE Left 07/15/2016    RADIOACTIVE SEED IMPLANTATION OF PROSTATE N/A 8/8/2018    Procedure: INSERTION, RADIOACTIVE SEED, PROSTATE;  Surgeon: Bipin Thompson MD;  Location: [facility] OR 1ST FLR;  Service: Urology;  Laterality: N/A;  1 hour    Squamous cell cancer removal x3 with Mohs " surgery      TONSILLECTOMY      TOTAL KNEE ARTHROPLASTY  10/2012    trus/bx      ULTRASOUND GUIDANCE  2/14/2020    Procedure: Ultrasound Guidance;  Surgeon: Miguel Angel Brady MD;  Location: [facility] CATH LAB;  Service: Cardiology;;     Review of patient's allergies indicates:   Allergen Reactions    Ciprofloxacin Rash     Diffuse pruritic morbilliform rash developed 3/15/2017 after dose of cipro; previously in 2/2017 he had rash/fevers after initiation of cipro    Zosyn [piperacillin-tazobactam] Rash     Diffuse pruritic morbilliform rash developed 3/15/2017.  Then, 430am dose on 3/16 and rash worsened with SOB/tachypnea but no hypoxemia.     Bacitracin Itching and Rash     Violaceous rash in area of topical Tx.      Social History     Social History Narrative    Not on file     Family History   Problem Relation Age of Onset    Skin cancer Father     Lung cancer Father     Cancer Father         smoker,     Alzheimer's disease Mother     Hypertension Mother     Cancer Sister         colon, lung cancer     No Known Problems Sister     Cancer Brother         skin cancer, polypectomy     Peripheral vascular disease Unknown     No Known Problems Maternal Aunt     No Known Problems Maternal Uncle     No Known Problems Paternal Aunt     No Known Problems Paternal Uncle     No Known Problems Maternal Grandmother     No Known Problems Maternal Grandfather     No Known Problems Paternal Grandmother     No Known Problems Paternal Grandfather     Melanoma Neg Hx     Psoriasis Neg Hx     Lupus Neg Hx     Eczema Neg Hx     Amblyopia Neg Hx     Blindness Neg Hx     Cataracts Neg Hx     Diabetes Neg Hx     Glaucoma Neg Hx     Macular degeneration Neg Hx     Retinal detachment Neg Hx     Strabismus Neg Hx     Stroke Neg Hx     Thyroid disease Neg Hx     Acne Neg Hx          Current Outpatient Medications:     acyclovir (ZOVIRAX) 800 MG Tab, Take 1 tablet (800 mg total) by mouth 2 (two) times  daily., Disp: 180 tablet, Rfl: 6    albuterol (PROVENTIL/VENTOLIN HFA) 90 mcg/actuation inhaler, Inhale 2 puffs into the lungs every 6 (six) hours as needed for Wheezing. Rescue, Disp: 18 g, Rfl: 0    apixaban (ELIQUIS) 5 mg Tab, Take 1 tablet (5 mg total) by mouth 2 (two) times daily., Disp: 180 tablet, Rfl: 4    artificial tears (ISOPTO TEARS) 0.5 % ophthalmic solution, Place 1 drop into both eyes as needed., Disp: , Rfl:     atorvastatin (LIPITOR) 40 MG tablet, Take 1 tablet (40 mg total) by mouth once daily., Disp: 90 tablet, Rfl: 1    clopidogreL (PLAVIX) 75 mg tablet, Take 1 tablet (75 mg total) by mouth once daily., Disp: 90 tablet, Rfl: 3    CYANOCOBALAMIN, VITAMIN B-12, (VITAMIN B-12 ORAL), Take 2,500 mcg by mouth once daily., Disp: , Rfl:     fluorouraciL (EFUDEX) 5 % cream, AAA left lower medial leg bid x 4 weeks, Disp: 40 g, Rfl: 1    fluticasone propionate (FLONASE) 50 mcg/actuation nasal spray, 1 spray (50 mcg total) by Each Nostril route once daily. (Patient taking differently: 1 spray by Each Nostril route once daily. As needed), Disp: 1 Bottle, Rfl: 0    gabapentin (NEURONTIN) 300 MG capsule, Take 1 capsule (300 mg total) by mouth 3 (three) times daily., Disp: 270 capsule, Rfl: 1    melatonin 5 mg Tab, Take 10 mg by mouth nightly., Disp: , Rfl:     metoprolol succinate (TOPROL-XL) 25 MG 24 hr tablet, Take 25 mg by mouth once daily. , Disp: , Rfl:     multivitamin (MULTIPLE VITAMINS) per tablet, Take 1 tablet by mouth once daily., Disp: , Rfl:     nitroGLYCERIN (NITROSTAT) 0.4 MG SL tablet, Place 1 tablet (0.4 mg total) under the tongue every 5 (five) minutes as needed for Chest pain., Disp: 25 tablet, Rfl: 11    omeprazole (PRILOSEC) 20 MG capsule, Take 1 capsule (20 mg total) by mouth daily as needed., Disp: 90 capsule, Rfl: 0    oxybutynin (DITROPAN-XL) 5 MG TR24, Take 1 tablet (5 mg total) by mouth once daily., Disp: 30 tablet, Rfl: 11    tamsulosin (FLOMAX) 0.4 mg Cap, Take 1  capsule (0.4 mg total) by mouth once daily., Disp: 90 capsule, Rfl: 3    triamcinolone acetonide 0.1% (KENALOG) 0.1 % cream, Apply topically 2 (two) times daily. Apply to affected area twice daily as directed., Disp: 453.6 g, Rfl: 0    varicella-zoster gE-AS01B, PF, (SHINGRIX, PF,) 50 mcg/0.5 mL injection, Inject into the muscle., Disp: 1 each, Rfl: 0    venlafaxine (EFFEXOR) 75 MG tablet, Take 2 tablets (150 mg total) by mouth 2 (two) times daily., Disp: 360 tablet, Rfl: 1    vitamin D 1000 units Tab, Take 1 tablet (1,000 Units total) by mouth once daily., Disp: , Rfl:       Review of Systems:  Constitutional: no fever or chills  Eyes: no visual changes  ENT: no nasal congestion or sore throat  Respiratory: no cough or shortness of breath  Cardiovascular: no chest pain  Gastrointestinal: no nausea or vomiting, tolerating diet  Musculoskeletal: arthralgias and decreased ROM    OBJECTIVE:      Vital Signs (Most Recent):  There were no vitals filed for this visit.  There is no height or weight on file to calculate BMI.      Physical Exam:  Constitutional: The patient appears well-developed and well-nourished. No distress.   Head: Normocephalic and atraumatic.   Nose: Nose normal.   Eyes: Conjunctivae and EOM are normal.   Neck: No tracheal deviation present.   Cardiovascular: Normal rate and intact distal pulses.    Pulmonary/Chest: Effort normal. No respiratory distress.   Abdominal: There is no guarding.   Neurological: The patient is alert.   Psychiatric: The patient has a normal mood and affect.     Right Hand/Wrist Examination:    Observation/Inspection:  Swelling  none    Deformity  none  Discoloration  none     Scars   none    Atrophy  None  Triggering and pain at the right thumb A1 pulley    HAND/WRIST EXAMINATION:  Finkelstein's Test   Neg  WHAT Test    Neg  Snuff box tenderness   Neg  Angela's Test    Neg  Hook of Hamate Tenderness  Neg  CMC grind    Neg  Circumduction test   Neg    Neurovascular  Exam:  Digits WWP, brisk CR < 3s throughout  NVI motor/LTS to M/R/U nerves, radial pulse 2+  Tinel's Test - Carpal Tunnel  Neg  Tinel's Test - Cubital Tunnel  Neg  Phalen's Test    Neg  Median Nerve Compression Test Neg    Patient able to make a full composite fist without extensor lag. Palpable tendon nodule just proximal to A1 pulley of thumb. Mechanical locking can be elicited on exam that is painful and requires manual unlocking.     ROM hand/wrist/elbow full, painless    RRR  CTAB  Abd S/NT/ND +BS    Diagnostic Results:     Xray - Right hand with CMC and IPJ of thumb arthritis    ASSESSMENT/PLAN:      Janusz Montgomery Jr. is a 75 y.o. male with right trigger thumb.   Treatment options were discussed.  The patient like to proceed with a right thumb A1 pulley release which I feel is reasonable.  We will set this up at his convenience.  Of note the patient has stents and is on anticoagulation.  He will obtain cardiology clearance prior to surgery.    The patient has not responded to adequate non operative treatment at this time and/or non operative treatment is not indicated. Thus, the risks, benefits and alternatives to surgery were discussed with the patient in detail.  Specific risks include but are not limited to bleeding, infection, vessel and/or nerve damage, pain, numbness, tingling, complex regional pain syndrome, compartment syndrome, failure to return to pre-injury and/or preoperative functional status, scar sensitivity, delayed healing, inability to return to work, pulley injury, tendon injury, bowstringing, partial and/or incomplete relief of symptoms, weakness, persistence of and/or worsening of symptoms, surgical failure, osteomyelitis, amputation, loss of function, stiffness, functional debility, dysfunction, decreased  strength, need for prolonged postoperative rehabilitation, need for further surgery, deep venous thrombosis, pulmonary embolism, arthritis and death.  The patient states an  understanding and wishes to proceed with surgery.   All questions were answered.  No guarantees were implied or stated.  Written informed consent was obtained.      Rad Swift M.D.     Please be aware that this note has been generated with the assistance of [employer] voice-to-text.  Please excuse any spelling or grammatical errors.

## 2020-10-06 NOTE — H&P
[facility]  History & Physical  Orthopedics     SUBJECTIVE:       COVID-19 attestation:  This patient was treated during the COVID-19 pandemic.  This was discussed with the patient, they are aware of our current policies and procedures, were given the option of delaying their visit and or switching to a virtual visit, delaying their surgery when applicable, and they elect to proceed.     Chief Complaint: right thumb triggering     Referring Provider: No ref. provider found      History of Present Illness:  Patient is a 75 y.o. right hand dominant male who presents today with complaints of right thumb triggering. Starting 3 months ago and has gotten worse. Pain over volar A1 pulley of thumb without radiation. Denies any pain in IPJ of thumb. Mechanical symptoms that require manual unlocking. No previous injuries or surgeries to RUE. Denies any numbness/tingling. Has not tried any treatments other than tylenol for his leg pain that helps mildly.      Interval interval history October 6, 2020:  The patient returns today for re-evaluation of his right thumb.  He notes that the prior A1 pulley injection did not help to any significant extent and that he still has significantly symptomatic triggering and pain at the A1 pulley.  He would like to discuss surgical options after a.     The patient is a/an retiree.     Onset of symptoms/DOI was 3 months.     Symptoms are aggravated by activity and movement.     Symptoms are alleviated by rest.     Symptoms consist of pain and decreased ROM.     The patient rates their pain as a 5/10.     Attempted treatment(s) and/or interventions include rest.     The patient denies any fevers, chills, N/V, D/C and presents for evaluation.             Past Medical History:   Diagnosis Date    NAT (acute kidney injury) 3/19/2017    ALLERGIC RHINITIS      Anemia      Anxiety      Basal cell carcinoma 10/19/2018     forehead and right medial shoulder    Basal cell  "carcinoma 01/09/2019     left nasal bridge and left posterior ear    Basal cell carcinoma 06/12/2020     left lower medial leg - Efudex    Chronic rhinitis 5/3/2013    Chronic rhinitis 5/3/2013    Coronary artery disease involving native coronary artery of native heart without angina pectoris s/p RCA stent      Cortical cataract of both eyes 3/18/2016    Delayed sleep phase syndrome 3/13/2019    Depression      Erectile dysfunction 3/24/2014    Erectile dysfunction 3/24/2014    Essential hypertension      GERD (gastroesophageal reflux disease) 7/25/2012    Gout, arthritis      Grade III open fracture of left tibia and fibula s/p ex-fix on 7/1/16 and ORIF of left tibia on 7/15 7/6/2016    H/O: iritis      Helicobacter pylori (H. pylori) infection       Treated    Herpes simplex keratoconjunctivitis 9/30/2015     - on acyclovir - followed by opthalmology, Dr. Uribe     Herpes simplex keratoconjunctivitis 9/30/2015     - on acyclovir - followed by opthalmology, Dr. Uribe     Hyperkalemia 2/28/2017    Hyperlipidemia      Hypogonadism male      Hypogonadism male      Mixed anxiety and depressive disorder      Morbid obesity      Obstructive sleep apnea on CPAP       CPAP    Osteoarthritis of left knee 7/25/2012    Paroxysmal atrial fibrillation 7/6/2016    Primary osteoarthritis of left knee 7/25/2012    Prominent aorta 1/25/2016     "RESULTS: THE HEART IS MILDLY ENLARGED WITH A SLIGHTLY PROMINENT AORTA" - Xray Chest PA & Lateral 12-     Prostate cancer 2/15/2016     - followed by urology, Dr. Young     Prostate cancer 2/15/2016     - followed by urology, Dr. 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 Conteh    CATARACT EXTRACTION W/  INTRAOCULAR LENS IMPLANT Left 4/12/2016         COLONOSCOPY N/A 7/23/2020     Procedure: COLONOSCOPY;  Surgeon: Nico Lackey MD;  Location: [facility] (2ND FLR);  Service: Endoscopy;  Laterality: N/A;    ESOPHAGOGASTRODUODENOSCOPY N/A 7/23/2020     Procedure: EGD (ESOPHAGOGASTRODUODENOSCOPY);  Surgeon: Nico Lackey MD;  Location: [facility] (2ND FLR);  Service: Endoscopy;  Laterality: N/A;  per Dr Lackey-Will proceed with EGD and colonoscopy on the 2nd floor due to his comorbidities including obesity, sleep apnea, restrictive lung disease, coronary artery disease.  has loop recorder      ok to hold Eliquis 2 days per Dr Sandhu-must remain    EXTERNAL FIXATION TIBIAL FRACTURE Left 07/01/2016    INSERTION OF IMPLANTABLE LOOP RECORDER   04/07/2017    LEFT HEART CATHETERIZATION Left 1/30/2020     Procedure: Left heart cath;  Surgeon: Benjamin Sandhu MD;  Location: [facility] CATH LAB;  Service: Cardiology;  Laterality: Left;  RN PREOP 1/28/20--Pt starting Plavix loading dose today (8pills)- Dr. Sandhu aware.  Pt has a bandaged "non healing area to LLE"--Dr. Sandhu aware.    LEFT HEART CATHETERIZATION Left 2/14/2020     Procedure: Left heart cath, IVUS guided left main / LAD PCI. Noon start, radial approach;  Surgeon: Miguel Angel Brady MD;  Location: [facility] CATH LAB;  Service: Cardiology;  Laterality: Left;  RN PRE OP 2-7-2020  BMI--45.61    ORIF TIBIA FRACTURE Left 07/15/2016    RADIOACTIVE SEED IMPLANTATION OF PROSTATE N/A 8/8/2018     Procedure: INSERTION, RADIOACTIVE SEED, PROSTATE;  Surgeon: Bipin Thompson MD;  [facility];  Service: Urology;  " Laterality: N/A;  1 hour    Squamous cell cancer removal x3 with Mohs surgery        TONSILLECTOMY        TOTAL KNEE ARTHROPLASTY   10/2012    trus/bx        ULTRASOUND GUIDANCE   2/14/2020     Procedure: Ultrasound Guidance;  Surgeon: Miguel Angel Brady MD;  Location: [facility] CATH LAB;  Service: Cardiology;;            Review of patient's allergies indicates:   Allergen Reactions    Ciprofloxacin Rash       Diffuse pruritic morbilliform rash developed 3/15/2017 after dose of cipro; previously in 2/2017 he had rash/fevers after initiation of cipro    Zosyn [piperacillin-tazobactam] Rash       Diffuse pruritic morbilliform rash developed 3/15/2017.  Then, 430am dose on 3/16 and rash worsened with SOB/tachypnea but no hypoxemia.     Bacitracin Itching and Rash       Violaceous rash in area of topical Tx.       Social History          Social History Narrative    Not on file            Family History   Problem Relation Age of Onset    Skin cancer Father      Lung cancer Father      Cancer Father           smoker,     Alzheimer's disease Mother      Hypertension Mother      Cancer Sister           colon, lung cancer     No Known Problems Sister      Cancer Brother           skin cancer, polypectomy     Peripheral vascular disease Unknown      No Known Problems Maternal Aunt      No Known Problems Maternal Uncle      No Known Problems Paternal Aunt      No Known Problems Paternal Uncle      No Known Problems Maternal Grandmother      No Known Problems Maternal Grandfather      No Known Problems Paternal Grandmother      No Known Problems Paternal Grandfather      Melanoma Neg Hx      Psoriasis Neg Hx      Lupus Neg Hx      Eczema Neg Hx      Amblyopia Neg Hx      Blindness Neg Hx      Cataracts Neg Hx      Diabetes Neg Hx      Glaucoma Neg Hx      Macular degeneration Neg Hx      Retinal detachment Neg Hx      Strabismus Neg Hx      Stroke Neg Hx      Thyroid disease Neg Hx      Acne Neg  Hx              Current Outpatient Medications:     acyclovir (ZOVIRAX) 800 MG Tab, Take 1 tablet (800 mg total) by mouth 2 (two) times daily., Disp: 180 tablet, Rfl: 6    albuterol (PROVENTIL/VENTOLIN HFA) 90 mcg/actuation inhaler, Inhale 2 puffs into the lungs every 6 (six) hours as needed for Wheezing. Rescue, Disp: 18 g, Rfl: 0    apixaban (ELIQUIS) 5 mg Tab, Take 1 tablet (5 mg total) by mouth 2 (two) times daily., Disp: 180 tablet, Rfl: 4    artificial tears (ISOPTO TEARS) 0.5 % ophthalmic solution, Place 1 drop into both eyes as needed., Disp: , Rfl:     atorvastatin (LIPITOR) 40 MG tablet, Take 1 tablet (40 mg total) by mouth once daily., Disp: 90 tablet, Rfl: 1    clopidogreL (PLAVIX) 75 mg tablet, Take 1 tablet (75 mg total) by mouth once daily., Disp: 90 tablet, Rfl: 3    CYANOCOBALAMIN, VITAMIN B-12, (VITAMIN B-12 ORAL), Take 2,500 mcg by mouth once daily., Disp: , Rfl:     fluorouraciL (EFUDEX) 5 % cream, AAA left lower medial leg bid x 4 weeks, Disp: 40 g, Rfl: 1    fluticasone propionate (FLONASE) 50 mcg/actuation nasal spray, 1 spray (50 mcg total) by Each Nostril route once daily. (Patient taking differently: 1 spray by Each Nostril route once daily. As needed), Disp: 1 Bottle, Rfl: 0    gabapentin (NEURONTIN) 300 MG capsule, Take 1 capsule (300 mg total) by mouth 3 (three) times daily., Disp: 270 capsule, Rfl: 1    melatonin 5 mg Tab, Take 10 mg by mouth nightly., Disp: , Rfl:     metoprolol succinate (TOPROL-XL) 25 MG 24 hr tablet, Take 25 mg by mouth once daily. , Disp: , Rfl:     multivitamin (MULTIPLE VITAMINS) per tablet, Take 1 tablet by mouth once daily., Disp: , Rfl:     nitroGLYCERIN (NITROSTAT) 0.4 MG SL tablet, Place 1 tablet (0.4 mg total) under the tongue every 5 (five) minutes as needed for Chest pain., Disp: 25 tablet, Rfl: 11    omeprazole (PRILOSEC) 20 MG capsule, Take 1 capsule (20 mg total) by mouth daily as needed., Disp: 90 capsule, Rfl: 0    oxybutynin  (DITROPAN-XL) 5 MG TR24, Take 1 tablet (5 mg total) by mouth once daily., Disp: 30 tablet, Rfl: 11    tamsulosin (FLOMAX) 0.4 mg Cap, Take 1 capsule (0.4 mg total) by mouth once daily., Disp: 90 capsule, Rfl: 3    triamcinolone acetonide 0.1% (KENALOG) 0.1 % cream, Apply topically 2 (two) times daily. Apply to affected area twice daily as directed., Disp: 453.6 g, Rfl: 0    varicella-zoster gE-AS01B, PF, (SHINGRIX, PF,) 50 mcg/0.5 mL injection, Inject into the muscle., Disp: 1 each, Rfl: 0    venlafaxine (EFFEXOR) 75 MG tablet, Take 2 tablets (150 mg total) by mouth 2 (two) times daily., Disp: 360 tablet, Rfl: 1    vitamin D 1000 units Tab, Take 1 tablet (1,000 Units total) by mouth once daily., Disp: , Rfl:         Review of Systems:  Constitutional: no fever or chills  Eyes: no visual changes  ENT: no nasal congestion or sore throat  Respiratory: no cough or shortness of breath  Cardiovascular: no chest pain  Gastrointestinal: no nausea or vomiting, tolerating diet  Musculoskeletal: arthralgias and decreased ROM     OBJECTIVE:       Vital Signs (Most Recent):  Vitals   There were no vitals filed for this visit.     There is no height or weight on file to calculate BMI.        Physical Exam:  Constitutional: The patient appears well-developed and well-nourished. No distress.   Head: Normocephalic and atraumatic.   Nose: Nose normal.   Eyes: Conjunctivae and EOM are normal.   Neck: No tracheal deviation present.   Cardiovascular: Normal rate and intact distal pulses.    Pulmonary/Chest: Effort normal. No respiratory distress.   Abdominal: There is no guarding.   Neurological: The patient is alert.   Psychiatric: The patient has a normal mood and affect.      Right Hand/Wrist Examination:     Observation/Inspection:  Swelling                       none                  Deformity                     none  Discoloration               none                  Scars                           none                   Atrophy                        None  Triggering and pain at the right thumb A1 pulley     HAND/WRIST EXAMINATION:  Finkelstein's Test                                Neg  WHAT Test                                         Neg  Snuff box tenderness                          Neg  Angela's Test                                     Neg  Hook of Hamate Tenderness              Neg  CMC grind                                           Neg  Circumduction test                              Neg     Neurovascular Exam:  Digits WWP, brisk CR < 3s throughout  NVI motor/LTS to M/R/U nerves, radial pulse 2+  Tinel's Test - Carpal Tunnel                Neg  Tinel's Test - Cubital Tunnel               Neg  Phalen's Test                                      Neg  Median Nerve Compression Test       Neg     Patient able to make a full composite fist without extensor lag. Palpable tendon nodule just proximal to A1 pulley of thumb. Mechanical locking can be elicited on exam that is painful and requires manual unlocking.      ROM hand/wrist/elbow full, painless     RRR  CTAB  Abd S/NT/ND +BS     Diagnostic Results:     Xray - Right hand with CMC and IPJ of thumb arthritis     ASSESSMENT/PLAN:       Janusz Montgomery Jr. is a 75 y.o. male with right trigger thumb.   Treatment options were discussed.  The patient like to proceed with a right thumb A1 pulley release which I feel is reasonable.  We will set this up at his convenience.  Of note the patient has stents and is on anticoagulation.  He will obtain cardiology clearance prior to surgery.     The patient has not responded to adequate non operative treatment at this time and/or non operative treatment is not indicated. Thus, the risks, benefits and alternatives to surgery were discussed with the patient in detail.  Specific risks include but are not limited to bleeding, infection, vessel and/or nerve damage, pain, numbness, tingling, complex regional pain syndrome, compartment syndrome,  failure to return to pre-injury and/or preoperative functional status, scar sensitivity, delayed healing, inability to return to work, pulley injury, tendon injury, bowstringing, partial and/or incomplete relief of symptoms, weakness, persistence of and/or worsening of symptoms, surgical failure, osteomyelitis, amputation, loss of function, stiffness, functional debility, dysfunction, decreased  strength, need for prolonged postoperative rehabilitation, need for further surgery, deep venous thrombosis, pulmonary embolism, arthritis and death.  The patient states an understanding and wishes to proceed with surgery.   All questions were answered.  No guarantees were implied or stated.  Written informed consent was obtained.       Rad Swift M.D.     · Please be aware that this note has been generated with the assistance of [email or web address] voice-to-text.  Please excuse any spelling or grammatical errors.

## 2020-10-06 NOTE — H&P (VIEW-ONLY)
[facility]  History & Physical  Orthopedics     SUBJECTIVE:       COVID-19 attestation:  This patient was treated during the COVID-19 pandemic.  This was discussed with the patient, they are aware of our current policies and procedures, were given the option of delaying their visit and or switching to a virtual visit, delaying their surgery when applicable, and they elect to proceed.     Chief Complaint: right thumb triggering     Referring Provider: No ref. provider found      History of Present Illness:  Patient is a 75 y.o. right hand dominant male who presents today with complaints of right thumb triggering. Starting 3 months ago and has gotten worse. Pain over volar A1 pulley of thumb without radiation. Denies any pain in IPJ of thumb. Mechanical symptoms that require manual unlocking. No previous injuries or surgeries to RUE. Denies any numbness/tingling. Has not tried any treatments other than tylenol for his leg pain that helps mildly.      Interval interval history October 6, 2020:  The patient returns today for re-evaluation of his right thumb.  He notes that the prior A1 pulley injection did not help to any significant extent and that he still has significantly symptomatic triggering and pain at the A1 pulley.  He would like to discuss surgical options after a.     The patient is a/an retiree.     Onset of symptoms/DOI was 3 months.     Symptoms are aggravated by activity and movement.     Symptoms are alleviated by rest.     Symptoms consist of pain and decreased ROM.     The patient rates their pain as a 5/10.     Attempted treatment(s) and/or interventions include rest.     The patient denies any fevers, chills, N/V, D/C and presents for evaluation.             Past Medical History:   Diagnosis Date    NAT (acute kidney injury) 3/19/2017    ALLERGIC RHINITIS      Anemia      Anxiety      Basal cell carcinoma 10/19/2018     forehead and right medial shoulder    Basal cell  "carcinoma 01/09/2019     left nasal bridge and left posterior ear    Basal cell carcinoma 06/12/2020     left lower medial leg - Efudex    Chronic rhinitis 5/3/2013    Chronic rhinitis 5/3/2013    Coronary artery disease involving native coronary artery of native heart without angina pectoris s/p RCA stent      Cortical cataract of both eyes 3/18/2016    Delayed sleep phase syndrome 3/13/2019    Depression      Erectile dysfunction 3/24/2014    Erectile dysfunction 3/24/2014    Essential hypertension      GERD (gastroesophageal reflux disease) 7/25/2012    Gout, arthritis      Grade III open fracture of left tibia and fibula s/p ex-fix on 7/1/16 and ORIF of left tibia on 7/15 7/6/2016    H/O: iritis      Helicobacter pylori (H. pylori) infection       Treated    Herpes simplex keratoconjunctivitis 9/30/2015     - on acyclovir - followed by opthalmology, Dr. Uribe     Herpes simplex keratoconjunctivitis 9/30/2015     - on acyclovir - followed by opthalmology, Dr. Uribe     Hyperkalemia 2/28/2017    Hyperlipidemia      Hypogonadism male      Hypogonadism male      Mixed anxiety and depressive disorder      Morbid obesity      Obstructive sleep apnea on CPAP       CPAP    Osteoarthritis of left knee 7/25/2012    Paroxysmal atrial fibrillation 7/6/2016    Primary osteoarthritis of left knee 7/25/2012    Prominent aorta 1/25/2016     "RESULTS: THE HEART IS MILDLY ENLARGED WITH A SLIGHTLY PROMINENT AORTA" - Xray Chest PA & Lateral 12-     Prostate cancer 2/15/2016     - followed by urology, Dr. Young     Prostate cancer 2/15/2016     - followed by urology, Dr. 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 Conteh    CATARACT EXTRACTION W/  INTRAOCULAR LENS IMPLANT Left 4/12/2016         COLONOSCOPY N/A 7/23/2020     Procedure: COLONOSCOPY;  Surgeon: Nico Lackey MD;  Location: [facility] (2ND FLR);  Service: Endoscopy;  Laterality: N/A;    ESOPHAGOGASTRODUODENOSCOPY N/A 7/23/2020     Procedure: EGD (ESOPHAGOGASTRODUODENOSCOPY);  Surgeon: Nico Lackey MD;  Location: [facility] (2ND FLR);  Service: Endoscopy;  Laterality: N/A;  per Dr Lackey-Will proceed with EGD and colonoscopy on the 2nd floor due to his comorbidities including obesity, sleep apnea, restrictive lung disease, coronary artery disease.  has loop recorder      ok to hold Eliquis 2 days per Dr Sandhu-must remain    EXTERNAL FIXATION TIBIAL FRACTURE Left 07/01/2016    INSERTION OF IMPLANTABLE LOOP RECORDER   04/07/2017    LEFT HEART CATHETERIZATION Left 1/30/2020     Procedure: Left heart cath;  Surgeon: Benjamin Sandhu MD;  Location: [facility] CATH LAB;  Service: Cardiology;  Laterality: Left;  RN PREOP 1/28/20--Pt starting Plavix loading dose today (8pills)- Dr. Sandhu aware.  Pt has a bandaged "non healing area to LLE"--Dr. Sandhu aware.    LEFT HEART CATHETERIZATION Left 2/14/2020     Procedure: Left heart cath, IVUS guided left main / LAD PCI. Noon start, radial approach;  Surgeon: Miguel Angel Brady MD;  Location: [facility] CATH LAB;  Service: Cardiology;  Laterality: Left;  RN PRE OP 2-7-2020  BMI--45.61    ORIF TIBIA FRACTURE Left 07/15/2016    RADIOACTIVE SEED IMPLANTATION OF PROSTATE N/A 8/8/2018     Procedure: INSERTION, RADIOACTIVE SEED, PROSTATE;  Surgeon: Bipin Thompson MD;  [facility];  Service: Urology;  " Laterality: N/A;  1 hour    Squamous cell cancer removal x3 with Mohs surgery        TONSILLECTOMY        TOTAL KNEE ARTHROPLASTY   10/2012    trus/bx        ULTRASOUND GUIDANCE   2/14/2020     Procedure: Ultrasound Guidance;  Surgeon: Miguel Angel Brady MD;  Location: [facility] CATH LAB;  Service: Cardiology;;            Review of patient's allergies indicates:   Allergen Reactions    Ciprofloxacin Rash       Diffuse pruritic morbilliform rash developed 3/15/2017 after dose of cipro; previously in 2/2017 he had rash/fevers after initiation of cipro    Zosyn [piperacillin-tazobactam] Rash       Diffuse pruritic morbilliform rash developed 3/15/2017.  Then, 430am dose on 3/16 and rash worsened with SOB/tachypnea but no hypoxemia.     Bacitracin Itching and Rash       Violaceous rash in area of topical Tx.       Social History          Social History Narrative    Not on file            Family History   Problem Relation Age of Onset    Skin cancer Father      Lung cancer Father      Cancer Father           smoker,     Alzheimer's disease Mother      Hypertension Mother      Cancer Sister           colon, lung cancer     No Known Problems Sister      Cancer Brother           skin cancer, polypectomy     Peripheral vascular disease Unknown      No Known Problems Maternal Aunt      No Known Problems Maternal Uncle      No Known Problems Paternal Aunt      No Known Problems Paternal Uncle      No Known Problems Maternal Grandmother      No Known Problems Maternal Grandfather      No Known Problems Paternal Grandmother      No Known Problems Paternal Grandfather      Melanoma Neg Hx      Psoriasis Neg Hx      Lupus Neg Hx      Eczema Neg Hx      Amblyopia Neg Hx      Blindness Neg Hx      Cataracts Neg Hx      Diabetes Neg Hx      Glaucoma Neg Hx      Macular degeneration Neg Hx      Retinal detachment Neg Hx      Strabismus Neg Hx      Stroke Neg Hx      Thyroid disease Neg Hx      Acne Neg  Hx              Current Outpatient Medications:     acyclovir (ZOVIRAX) 800 MG Tab, Take 1 tablet (800 mg total) by mouth 2 (two) times daily., Disp: 180 tablet, Rfl: 6    albuterol (PROVENTIL/VENTOLIN HFA) 90 mcg/actuation inhaler, Inhale 2 puffs into the lungs every 6 (six) hours as needed for Wheezing. Rescue, Disp: 18 g, Rfl: 0    apixaban (ELIQUIS) 5 mg Tab, Take 1 tablet (5 mg total) by mouth 2 (two) times daily., Disp: 180 tablet, Rfl: 4    artificial tears (ISOPTO TEARS) 0.5 % ophthalmic solution, Place 1 drop into both eyes as needed., Disp: , Rfl:     atorvastatin (LIPITOR) 40 MG tablet, Take 1 tablet (40 mg total) by mouth once daily., Disp: 90 tablet, Rfl: 1    clopidogreL (PLAVIX) 75 mg tablet, Take 1 tablet (75 mg total) by mouth once daily., Disp: 90 tablet, Rfl: 3    CYANOCOBALAMIN, VITAMIN B-12, (VITAMIN B-12 ORAL), Take 2,500 mcg by mouth once daily., Disp: , Rfl:     fluorouraciL (EFUDEX) 5 % cream, AAA left lower medial leg bid x 4 weeks, Disp: 40 g, Rfl: 1    fluticasone propionate (FLONASE) 50 mcg/actuation nasal spray, 1 spray (50 mcg total) by Each Nostril route once daily. (Patient taking differently: 1 spray by Each Nostril route once daily. As needed), Disp: 1 Bottle, Rfl: 0    gabapentin (NEURONTIN) 300 MG capsule, Take 1 capsule (300 mg total) by mouth 3 (three) times daily., Disp: 270 capsule, Rfl: 1    melatonin 5 mg Tab, Take 10 mg by mouth nightly., Disp: , Rfl:     metoprolol succinate (TOPROL-XL) 25 MG 24 hr tablet, Take 25 mg by mouth once daily. , Disp: , Rfl:     multivitamin (MULTIPLE VITAMINS) per tablet, Take 1 tablet by mouth once daily., Disp: , Rfl:     nitroGLYCERIN (NITROSTAT) 0.4 MG SL tablet, Place 1 tablet (0.4 mg total) under the tongue every 5 (five) minutes as needed for Chest pain., Disp: 25 tablet, Rfl: 11    omeprazole (PRILOSEC) 20 MG capsule, Take 1 capsule (20 mg total) by mouth daily as needed., Disp: 90 capsule, Rfl: 0    oxybutynin  (DITROPAN-XL) 5 MG TR24, Take 1 tablet (5 mg total) by mouth once daily., Disp: 30 tablet, Rfl: 11    tamsulosin (FLOMAX) 0.4 mg Cap, Take 1 capsule (0.4 mg total) by mouth once daily., Disp: 90 capsule, Rfl: 3    triamcinolone acetonide 0.1% (KENALOG) 0.1 % cream, Apply topically 2 (two) times daily. Apply to affected area twice daily as directed., Disp: 453.6 g, Rfl: 0    varicella-zoster gE-AS01B, PF, (SHINGRIX, PF,) 50 mcg/0.5 mL injection, Inject into the muscle., Disp: 1 each, Rfl: 0    venlafaxine (EFFEXOR) 75 MG tablet, Take 2 tablets (150 mg total) by mouth 2 (two) times daily., Disp: 360 tablet, Rfl: 1    vitamin D 1000 units Tab, Take 1 tablet (1,000 Units total) by mouth once daily., Disp: , Rfl:         Review of Systems:  Constitutional: no fever or chills  Eyes: no visual changes  ENT: no nasal congestion or sore throat  Respiratory: no cough or shortness of breath  Cardiovascular: no chest pain  Gastrointestinal: no nausea or vomiting, tolerating diet  Musculoskeletal: arthralgias and decreased ROM     OBJECTIVE:       Vital Signs (Most Recent):  Vitals   There were no vitals filed for this visit.     There is no height or weight on file to calculate BMI.        Physical Exam:  Constitutional: The patient appears well-developed and well-nourished. No distress.   Head: Normocephalic and atraumatic.   Nose: Nose normal.   Eyes: Conjunctivae and EOM are normal.   Neck: No tracheal deviation present.   Cardiovascular: Normal rate and intact distal pulses.    Pulmonary/Chest: Effort normal. No respiratory distress.   Abdominal: There is no guarding.   Neurological: The patient is alert.   Psychiatric: The patient has a normal mood and affect.      Right Hand/Wrist Examination:     Observation/Inspection:  Swelling                       none                  Deformity                     none  Discoloration               none                  Scars                           none                   Atrophy                        None  Triggering and pain at the right thumb A1 pulley     HAND/WRIST EXAMINATION:  Finkelstein's Test                                Neg  WHAT Test                                         Neg  Snuff box tenderness                          Neg  Angela's Test                                     Neg  Hook of Hamate Tenderness              Neg  CMC grind                                           Neg  Circumduction test                              Neg     Neurovascular Exam:  Digits WWP, brisk CR < 3s throughout  NVI motor/LTS to M/R/U nerves, radial pulse 2+  Tinel's Test - Carpal Tunnel                Neg  Tinel's Test - Cubital Tunnel               Neg  Phalen's Test                                      Neg  Median Nerve Compression Test       Neg     Patient able to make a full composite fist without extensor lag. Palpable tendon nodule just proximal to A1 pulley of thumb. Mechanical locking can be elicited on exam that is painful and requires manual unlocking.      ROM hand/wrist/elbow full, painless     RRR  CTAB  Abd S/NT/ND +BS     Diagnostic Results:     Xray - Right hand with CMC and IPJ of thumb arthritis     ASSESSMENT/PLAN:       Janusz Montgomery Jr. is a 75 y.o. male with right trigger thumb.   Treatment options were discussed.  The patient like to proceed with a right thumb A1 pulley release which I feel is reasonable.  We will set this up at his convenience.  Of note the patient has stents and is on anticoagulation.  He will obtain cardiology clearance prior to surgery.     The patient has not responded to adequate non operative treatment at this time and/or non operative treatment is not indicated. Thus, the risks, benefits and alternatives to surgery were discussed with the patient in detail.  Specific risks include but are not limited to bleeding, infection, vessel and/or nerve damage, pain, numbness, tingling, complex regional pain syndrome, compartment syndrome,  failure to return to pre-injury and/or preoperative functional status, scar sensitivity, delayed healing, inability to return to work, pulley injury, tendon injury, bowstringing, partial and/or incomplete relief of symptoms, weakness, persistence of and/or worsening of symptoms, surgical failure, osteomyelitis, amputation, loss of function, stiffness, functional debility, dysfunction, decreased  strength, need for prolonged postoperative rehabilitation, need for further surgery, deep venous thrombosis, pulmonary embolism, arthritis and death.  The patient states an understanding and wishes to proceed with surgery.   All questions were answered.  No guarantees were implied or stated.  Written informed consent was obtained.       Rad Swift M.D.     · Please be aware that this note has been generated with the assistance of [employer] voice-to-text.  Please excuse any spelling or grammatical errors.

## 2020-10-07 ENCOUNTER — HOSPITAL ENCOUNTER (OUTPATIENT)
Dept: CARDIOLOGY | Facility: HOSPITAL | Age: 75
Discharge: HOME | End: 2020-10-07
Attending: SURGERY
Payer: MEDICARE

## 2020-10-07 ENCOUNTER — PATIENT MESSAGE (OUTPATIENT)
Dept: CARDIOLOGY | Facility: CLINIC | Age: 75
End: 2020-10-07

## 2020-10-07 ENCOUNTER — OFFICE VISIT (OUTPATIENT)
Dept: WOUND CARE | Facility: CLINIC | Age: 75
End: 2020-10-07
Payer: MEDICARE

## 2020-10-07 VITALS
TEMPERATURE: 98 F | WEIGHT: 315 LBS | HEART RATE: 74 BPM | BODY MASS INDEX: 41.75 KG/M2 | SYSTOLIC BLOOD PRESSURE: 146 MMHG | HEIGHT: 73 IN | DIASTOLIC BLOOD PRESSURE: 73 MMHG

## 2020-10-07 DIAGNOSIS — I83.899 VARICOSE VEINS OF LEG WITH COMPLICATIONS: ICD-10-CM

## 2020-10-07 DIAGNOSIS — T81.89XA DELAYED SURGICAL WOUND HEALING, INITIAL ENCOUNTER: Primary | ICD-10-CM

## 2020-10-07 DIAGNOSIS — I87.2 VENOUS STASIS DERMATITIS OF BOTH LOWER EXTREMITIES: ICD-10-CM

## 2020-10-07 PROCEDURE — 29580 STRAPPING UNNA BOOT: CPT | Mod: HCNC,LT,S$GLB, | Performed by: NURSE PRACTITIONER

## 2020-10-07 PROCEDURE — 29580 PR STRAPPING UNNA BOOT: ICD-10-PCS | Mod: HCNC,LT,S$GLB, | Performed by: NURSE PRACTITIONER

## 2020-10-07 PROCEDURE — 99499 UNLISTED E&M SERVICE: CPT | Mod: HCNC,S$GLB,, | Performed by: NURSE PRACTITIONER

## 2020-10-07 PROCEDURE — 99999 PR PBB SHADOW E&M-EST. PATIENT-LVL V: ICD-10-PCS | Mod: PBBFAC,HCNC,, | Performed by: NURSE PRACTITIONER

## 2020-10-07 PROCEDURE — 93970 EXTREMITY STUDY: CPT | Mod: 26,HCNC,, | Performed by: SURGERY

## 2020-10-07 PROCEDURE — 93970 EXTREMITY STUDY: CPT | Mod: TC

## 2020-10-07 PROCEDURE — 93970 CV US LOWER VENOUS INSUFFICIENCY BILATERAL (CUPID ONLY): ICD-10-PCS | Mod: 26,HCNC,, | Performed by: SURGERY

## 2020-10-07 PROCEDURE — 99999 PR PBB SHADOW E&M-EST. PATIENT-LVL V: CPT | Mod: PBBFAC,HCNC,, | Performed by: NURSE PRACTITIONER

## 2020-10-07 PROCEDURE — 99499 NO LOS: ICD-10-PCS | Mod: HCNC,S$GLB,, | Performed by: NURSE PRACTITIONER

## 2020-10-07 NOTE — PROGRESS NOTES
Subjective:       Patient ID: Janusz Montgomery Jr. is a 75 y.o. male.    Chief Complaint: Wound Check    Wound Check     75 y.o. male referred by Dr. Kumar for evaluation and management of an open wound to the left lower leg.  He was seen by Dr. Kumar on 6/12/20 at which time he presented for evaluation of his wound which had been present for the past year. He requested and underwent a biopsy of the leg wound on that date.  The biopsy was positive for BCC.  He used efudex for about a month.  A coflex compression wrap was placed on the leg on his last visit.  The wound is healing as evidenced by increased granulation and wound contracture. He has neuropathy and is on gabapentin.  He is afebrile. He denies increased redness, swelling or purulent drainage.  He does not complain of pain.     Patient Active Problem List   Diagnosis    Essential hypertension    Other hyperlipidemia    Coronary artery disease involving native coronary artery of native heart without angina pectoris s/p RCA stent    Obstructive sleep apnea    GERD (gastroesophageal reflux disease)    Benign prostatic hyperplasia with urinary obstruction    Morbid obesity with body mass index of 45.0-49.9 in adult    Prostate cancer    Paroxysmal atrial fibrillation     Major depressive disorder, recurrent episode, mild    Generalized anxiety disorder    History of gout    Herpes simplex keratoconjunctivitis    Decreased range of motion of left ankle    Right leg weakness    Impaired mobility    Balance problems    PVD (peripheral vascular disease)    Bilateral leg edema    Bilateral carotid artery stenosis    Gait abnormality    Left leg weakness    Delayed sleep phase syndrome    Floppy eyelid syndrome    Dyspnea    Insomnia    Restrictive lung disease    Personal history of asbestosis    CAD (coronary artery disease)    Varicose veins of leg with complications    Delayed surgical wound healing    Venous stasis  "dermatitis of both lower extremities    Acute kidney injury superimposed on chronic kidney disease    CKD (chronic kidney disease) stage 3, GFR 30-59 ml/min    Hypotension    History of loop recorder     Past Medical History:   Diagnosis Date    NAT (acute kidney injury) 3/19/2017    ALLERGIC RHINITIS     Anemia     Anxiety     Basal cell carcinoma 10/19/2018    forehead and right medial shoulder    Basal cell carcinoma 01/09/2019    left nasal bridge and left posterior ear    Basal cell carcinoma 06/12/2020    left lower medial leg - Efudex    Chronic rhinitis 5/3/2013    Chronic rhinitis 5/3/2013    Coronary artery disease involving native coronary artery of native heart without angina pectoris s/p RCA stent     Cortical cataract of both eyes 3/18/2016    Delayed sleep phase syndrome 3/13/2019    Depression     Erectile dysfunction 3/24/2014    Erectile dysfunction 3/24/2014    Essential hypertension     GERD (gastroesophageal reflux disease) 7/25/2012    Gout, arthritis     Grade III open fracture of left tibia and fibula s/p ex-fix on 7/1/16 and ORIF of left tibia on 7/15 7/6/2016    H/O: iritis     Helicobacter pylori (H. pylori) infection     Treated    Herpes simplex keratoconjunctivitis 9/30/2015    - on acyclovir - followed by opthalmology, Dr. Uribe     Herpes simplex keratoconjunctivitis 9/30/2015    - on acyclovir - followed by opthalmology, Dr. Uribe     Hyperkalemia 2/28/2017    Hyperlipidemia     Hypogonadism male     Hypogonadism male     Mixed anxiety and depressive disorder     Morbid obesity     Obstructive sleep apnea on CPAP     CPAP    Osteoarthritis of left knee 7/25/2012    Paroxysmal atrial fibrillation 7/6/2016    Primary osteoarthritis of left knee 7/25/2012    Prominent aorta 1/25/2016    "RESULTS: THE HEART IS MILDLY ENLARGED WITH A SLIGHTLY PROMINENT AORTA" - Xray Chest PA & Lateral 12-     Prostate cancer 2/15/2016    - followed by " "urology, Dr. Young     Prostate cancer 2/15/2016    - followed by urology, Dr. 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 Conteh    CATARACT EXTRACTION W/  INTRAOCULAR LENS IMPLANT Left 4/12/2016        COLONOSCOPY N/A 7/23/2020    Procedure: COLONOSCOPY;  Surgeon: Nico Lackey MD;  Location: [facility] ([address] [place] [address]);  Service: Endoscopy;  Laterality: N/A;    ESOPHAGOGASTRODUODENOSCOPY N/A 7/23/2020    Procedure: EGD (ESOPHAGOGASTRODUODENOSCOPY);  Surgeon: Nico Lackey MD;  Location: [facility] ([address] [place] [address]);  Service: Endoscopy;  Laterality: N/A;  per Dr Lackey-Will proceed with EGD and colonoscopy on the 2nd floor due to his comorbidities including obesity, sleep apnea, restrictive lung disease, coronary artery disease.  has loop recorder      ok to hold Eliquis 2 days per Dr Sandhu-must remain    EXTERNAL FIXATION TIBIAL FRACTURE Left 07/01/2016    INSERTION OF IMPLANTABLE LOOP RECORDER  04/07/2017    LEFT HEART CATHETERIZATION Left 1/30/2020    Procedure: Left heart cath;  Surgeon: Benjamin Sandhu MD;  Location: [facility] CATH LAB;  Service: Cardiology;  Laterality: Left;  RN PREOP 1/28/20--Pt starting Plavix loading dose today (8pills)- Dr. Sandhu aware.  Pt has a bandaged "non healing area to LLE"--Dr. Sandhu aware.    LEFT HEART CATHETERIZATION Left 2/14/2020    Procedure: Left heart cath, IVUS guided left main / LAD PCI. Noon " start, radial approach;  Surgeon: Miguel Angel Brady MD;  Location: [facility] CATH LAB;  Service: Cardiology;  Laterality: Left;  RN PRE OP 2-7-2020  BMI--45.61    ORIF TIBIA FRACTURE Left 07/15/2016    RADIOACTIVE SEED IMPLANTATION OF PROSTATE N/A 8/8/2018    Procedure: INSERTION, RADIOACTIVE SEED, PROSTATE;  Surgeon: Bipin Thompson MD;  Location: [facility] OR [address] [place] [address];  Service: Urology;  Laterality: N/A;  1 hour    Squamous cell cancer removal x3 with Mohs surgery      TONSILLECTOMY      TOTAL KNEE ARTHROPLASTY  10/2012    trus/bx      ULTRASOUND GUIDANCE  2/14/2020    Procedure: Ultrasound Guidance;  Surgeon: Miguel Angel Brady MD;  Location: [facility] CATH LAB;  Service: Cardiology;;     Review of Systems      Unchanged from previous visit.  Objective:      Physical Exam  Vitals signs and nursing note reviewed.   Constitutional:       General: He is not in acute distress.     Appearance: He is well-developed. He is not diaphoretic.   HENT:      Head: Normocephalic and atraumatic.   Pulmonary:      Effort: Pulmonary effort is normal. No respiratory distress.   Musculoskeletal: Normal range of motion.         General: No tenderness.        Legs:    Skin:     General: Skin is warm and dry.      Findings: No erythema or rash.   Neurological:      Mental Status: He is alert and oriented to person, place, and time.   Psychiatric:         Behavior: Behavior normal.         Thought Content: Thought content normal.         Judgment: Judgment normal.             A vascular lab study performed on 9/18/20 revealed right leg segmental pressures and PVR waveforms revealed minimal to mild PAOD.  Left leg segmental pressures and PVR waveforms revealed mild to moderate PAOD.    Janusz was seen in the clinic room and placed in the supine position on the treatment table.  The left leg was cleansed with Easi-clense sponges and dried thoroughly.  Eucerin cream was applied to the lower legs. Calmoseptine was applied to the periwound area. A  hydrofiber dressing was applied to the wound and covered with an ABD pad.  The patient's foot was positioned at a 90 degree angle.  The coflex with calamine was applied per package instructions.  A two layered application was performed using a spiral technique beginning with the foam layer followed by the cohesive bandage avoiding creases or folds.  The wrap was started behind the first metatarsal and ended below the tibial tubercle of the knee.  There was overlap of each turn half the width of the previous turn.  The compression wrap will be changed every 7 days.    Assessment:       1. Delayed surgical wound healing, initial encounter    2. Varicose veins of leg with complications    3. Venous stasis dermatitis of both lower extremities               Plan:          Benedryl 50 mg every 4 hours as needed for itching.  Triamncinolone cream to affected area on legs.  Coflex compression wrap with calamine left lower leg as detailed above.  Patient was warned not to get the dressings wet and to use cast covers for showering.  Should the dressing become wet, he is to remove it, place a wet-to-dry dressing over the wound, cover with gauze and roll gauze and use ace wraps for compression and to secure bandages.  He should then notify this office as soon as possible to have a new dressing applied.  Return to clinic in one week.

## 2020-10-08 ENCOUNTER — INITIAL CONSULT (OUTPATIENT)
Dept: DERMATOLOGY | Facility: CLINIC | Age: 75
End: 2020-10-08
Payer: MEDICARE

## 2020-10-08 VITALS — HEIGHT: 73 IN | WEIGHT: 315 LBS | BODY MASS INDEX: 41.75 KG/M2

## 2020-10-08 DIAGNOSIS — C44.111 BASAL CELL CARCINOMA OF MEDIAL CANTHUS OF LEFT EYE: Primary | ICD-10-CM

## 2020-10-08 PROCEDURE — 1101F PT FALLS ASSESS-DOCD LE1/YR: CPT | Mod: HCNC,CPTII,S$GLB, | Performed by: DERMATOLOGY

## 2020-10-08 PROCEDURE — 1126F PR PAIN SEVERITY QUANTIFIED, NO PAIN PRESENT: ICD-10-PCS | Mod: HCNC,S$GLB,, | Performed by: DERMATOLOGY

## 2020-10-08 PROCEDURE — 1159F PR MEDICATION LIST DOCUMENTED IN MEDICAL RECORD: ICD-10-PCS | Mod: HCNC,S$GLB,, | Performed by: DERMATOLOGY

## 2020-10-08 PROCEDURE — 1159F MED LIST DOCD IN RCRD: CPT | Mod: HCNC,S$GLB,, | Performed by: DERMATOLOGY

## 2020-10-08 PROCEDURE — 3008F BODY MASS INDEX DOCD: CPT | Mod: HCNC,CPTII,S$GLB, | Performed by: DERMATOLOGY

## 2020-10-08 PROCEDURE — 99999 PR PBB SHADOW E&M-EST. PATIENT-LVL IV: CPT | Mod: PBBFAC,HCNC,, | Performed by: DERMATOLOGY

## 2020-10-08 PROCEDURE — 1101F PR PT FALLS ASSESS DOC 0-1 FALLS W/OUT INJ PAST YR: ICD-10-PCS | Mod: HCNC,CPTII,S$GLB, | Performed by: DERMATOLOGY

## 2020-10-08 PROCEDURE — 1126F AMNT PAIN NOTED NONE PRSNT: CPT | Mod: HCNC,S$GLB,, | Performed by: DERMATOLOGY

## 2020-10-08 PROCEDURE — 99213 OFFICE O/P EST LOW 20 MIN: CPT | Mod: HCNC,24,S$GLB, | Performed by: DERMATOLOGY

## 2020-10-08 PROCEDURE — 99213 PR OFFICE/OUTPT VISIT, EST, LEVL III, 20-29 MIN: ICD-10-PCS | Mod: HCNC,24,S$GLB, | Performed by: DERMATOLOGY

## 2020-10-08 PROCEDURE — 99999 PR PBB SHADOW E&M-EST. PATIENT-LVL IV: ICD-10-PCS | Mod: PBBFAC,HCNC,, | Performed by: DERMATOLOGY

## 2020-10-08 PROCEDURE — 3008F PR BODY MASS INDEX (BMI) DOCUMENTED: ICD-10-PCS | Mod: HCNC,CPTII,S$GLB, | Performed by: DERMATOLOGY

## 2020-10-08 NOTE — PROGRESS NOTES
REFERRING MD:  Bianca Kumar M.D.    CHIEF COMPLAINT:  New patient being consulted for Mohs' surgery evaluation.    HISTORY OF PRESENT ILLNESS:  75 y.o. male presents with a 1 year(s) history of growth on the L medial upper eyelid canthus. Concerned about how it is affecting his upper eyelid.     Negative for scabbing.  Negative for crusting.  Negative for bleeding.  Negative for itching.    Biopsy consistent with basal cell carcinoma.     No prior treatment.    Pacemaker: No  Defibrillator: No  Artificial joints: No  Artificial heart valves: No    PAST MEDICAL HISTORY:  Past Medical History:   Diagnosis Date    NAT (acute kidney injury) 3/19/2017    ALLERGIC RHINITIS     Anemia     Anxiety     Basal cell carcinoma 10/19/2018    forehead and right medial shoulder    Basal cell carcinoma 01/09/2019    left nasal bridge and left posterior ear    Basal cell carcinoma 06/12/2020    left lower medial leg - Efudex    Chronic rhinitis 5/3/2013    Chronic rhinitis 5/3/2013    Coronary artery disease involving native coronary artery of native heart without angina pectoris s/p RCA stent     Cortical cataract of both eyes 3/18/2016    Delayed sleep phase syndrome 3/13/2019    Depression     Erectile dysfunction 3/24/2014    Erectile dysfunction 3/24/2014    Essential hypertension     GERD (gastroesophageal reflux disease) 7/25/2012    Gout, arthritis     Grade III open fracture of left tibia and fibula s/p ex-fix on 7/1/16 and ORIF of left tibia on 7/15 7/6/2016    H/O: iritis     Helicobacter pylori (H. pylori) infection     Treated    Herpes simplex keratoconjunctivitis 9/30/2015    - on acyclovir - followed by opthalmology, Dr. Uribe     Herpes simplex keratoconjunctivitis 9/30/2015    - on acyclovir - followed by opthalmology, Dr. Uribe     Hyperkalemia 2/28/2017    Hyperlipidemia     Hypogonadism male     Hypogonadism male     Mixed anxiety and depressive disorder     Morbid obesity      "Obstructive sleep apnea on CPAP     CPAP    Osteoarthritis of left knee 7/25/2012    Paroxysmal atrial fibrillation 7/6/2016    Primary osteoarthritis of left knee 7/25/2012    Prominent aorta 1/25/2016    "RESULTS: THE HEART IS MILDLY ENLARGED WITH A SLIGHTLY PROMINENT AORTA" - Xray Chest PA & Lateral 12-     Prostate cancer 2/15/2016    - followed by urology, Dr. Young     Prostate cancer 2/15/2016    - followed by urology, Dr. 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 Conteh    CATARACT EXTRACTION W/  INTRAOCULAR LENS IMPLANT Left 4/12/2016        COLONOSCOPY N/A 7/23/2020    Procedure: COLONOSCOPY;  Surgeon: Nico Lackey MD;  Location: [facility] ([address] [place] [address]);  Service: Endoscopy;  Laterality: N/A;    ESOPHAGOGASTRODUODENOSCOPY N/A 7/23/2020    Procedure: EGD (ESOPHAGOGASTRODUODENOSCOPY);  Surgeon: Nico Lackey MD;  Location: [facility] ([address] [place] [address]);  Service: Endoscopy;  Laterality: N/A;  per Dr Lackey-Will proceed with EGD and colonoscopy on the 2nd floor due to his comorbidities including obesity, sleep apnea, restrictive lung disease, coronary artery disease.  has loop recorder      ok to hold Eliquis 2 days per Dr Sandhu-must remain    EXTERNAL FIXATION TIBIAL FRACTURE Left 07/01/2016    INSERTION OF IMPLANTABLE LOOP RECORDER  04/07/2017    LEFT HEART CATHETERIZATION Left 1/30/2020 " "   Procedure: Left heart cath;  Surgeon: Benjamin Sandhu MD;  Location: [facility] CATH LAB;  Service: Cardiology;  Laterality: Left;  RN PREOP 1/28/20--Pt starting Plavix loading dose today (8pills)- Dr. Sandhu aware.  Pt has a bandaged "non healing area to LLE"--Dr. Sandhu aware.    LEFT HEART CATHETERIZATION Left 2/14/2020    Procedure: Left heart cath, IVUS guided left main / LAD PCI. Noon start, radial approach;  Surgeon: Miguel Angel Brady MD;  Location: [facility] CATH LAB;  Service: Cardiology;  Laterality: Left;  RN PRE OP 2-7-2020  BMI--45.61    ORIF TIBIA FRACTURE Left 07/15/2016    RADIOACTIVE SEED IMPLANTATION OF PROSTATE N/A 8/8/2018    Procedure: INSERTION, RADIOACTIVE SEED, PROSTATE;  Surgeon: Bipin Thompson MD;  Location: [facility] OR [address] [place] [address];  Service: Urology;  Laterality: N/A;  1 hour    Squamous cell cancer removal x3 with Mohs surgery      TONSILLECTOMY      TOTAL KNEE ARTHROPLASTY  10/2012    trus/bx      ULTRASOUND GUIDANCE  2/14/2020    Procedure: Ultrasound Guidance;  Surgeon: Miguel Angel Brady MD;  Location: [facility] CATH LAB;  Service: Cardiology;;        SOCIAL HISTORY:  Dependencies:  never smoked    PERTINENT MEDICATIONS:  See medications list.  Plavix and Apixaban    ALLERGIES:  Ciprofloxacin, Zosyn [piperacillin-tazobactam], and Bacitracin    ROS:  Skin: See HPI  Constitutional: No fatigue, fever, malaise, weight loss, or night sweats.  Cardiovascular: No chest pain, palpitations, or edema.  Respiratory: No coughing, wheezing, SOB, or sputum production.    Physical Exam   Eyes:             General: Mood and affect normal. Alert and orient X3. Normal appearance.  Eyelids: L medial upper eyelid canthus with an ill-defined 7 x 9 mm pearly firm papule located 1 cm superiorly from the left medial canthus.   Head/Face:  no suspicious lesions    IMPRESSION:  Biopsy proven superficial basal cell carcinoma, L medial upper eyelid canthus, path# [id number].    PLAN:  The diagnosis and the pathology report " were discussed in detail with the patient. Treatment options were reviewed, including Mohs Micrographic Surgery, radiation, topical therapy, and standard excision.  After careful review of patient's history and physical exam, and after discussion of treatment options, the decision was made to perform Mohs micrographic surgery.    Will schedule patient for Mohs Micrographic Surgery and will coordinate reconstruction with Dr. Cooper in Oculoplastics. Risks, benefits, and alternatives of Mohs' surgery discussed with the patient. Discussed repair options including complex closure, skin flap, skin graft and second intention healing with the patient. Pre-operative instructions provided to the patient. Will defer to Dr. Cooper in regards to stopping Plavix and/or Eliquis.

## 2020-10-12 ENCOUNTER — LAB VISIT (OUTPATIENT)
Dept: FAMILY MEDICINE | Facility: CLINIC | Age: 75
End: 2020-10-12
Payer: MEDICARE

## 2020-10-12 ENCOUNTER — OFFICE VISIT (OUTPATIENT)
Dept: VASCULAR SURGERY | Facility: CLINIC | Age: 75
End: 2020-10-12
Payer: MEDICARE

## 2020-10-12 ENCOUNTER — ANESTHESIA EVENT (OUTPATIENT)
Dept: SURGERY | Facility: HOSPITAL | Age: 75
End: 2020-10-12
Payer: MEDICARE

## 2020-10-12 VITALS
WEIGHT: 315 LBS | DIASTOLIC BLOOD PRESSURE: 72 MMHG | SYSTOLIC BLOOD PRESSURE: 110 MMHG | BODY MASS INDEX: 41.75 KG/M2 | HEIGHT: 73 IN

## 2020-10-12 DIAGNOSIS — I70.248 ATHEROSCLEROSIS OF NATIVE ARTERY OF LEFT LOWER EXTREMITY WITH ULCERATION OF OTHER PART OF LOWER LEG: Primary | ICD-10-CM

## 2020-10-12 DIAGNOSIS — Z01.818 PREOP TESTING: ICD-10-CM

## 2020-10-12 PROCEDURE — 3074F PR MOST RECENT SYSTOLIC BLOOD PRESSURE < 130 MM HG: ICD-10-PCS | Mod: HCNC,CPTII,S$GLB, | Performed by: SURGERY

## 2020-10-12 PROCEDURE — 1126F PR PAIN SEVERITY QUANTIFIED, NO PAIN PRESENT: ICD-10-PCS | Mod: HCNC,S$GLB,, | Performed by: SURGERY

## 2020-10-12 PROCEDURE — U0003 INFECTIOUS AGENT DETECTION BY NUCLEIC ACID (DNA OR RNA); SEVERE ACUTE RESPIRATORY SYNDROME CORONAVIRUS 2 (SARS-COV-2) (CORONAVIRUS DISEASE [COVID-19]), AMPLIFIED PROBE TECHNIQUE, MAKING USE OF HIGH THROUGHPUT TECHNOLOGIES AS DESCRIBED BY CMS-2020-01-R: HCPCS | Mod: HCNC

## 2020-10-12 PROCEDURE — 3078F PR MOST RECENT DIASTOLIC BLOOD PRESSURE < 80 MM HG: ICD-10-PCS | Mod: HCNC,CPTII,S$GLB, | Performed by: SURGERY

## 2020-10-12 PROCEDURE — 99999 PR PBB SHADOW E&M-EST. PATIENT-LVL III: ICD-10-PCS | Mod: PBBFAC,HCNC,, | Performed by: SURGERY

## 2020-10-12 PROCEDURE — 99999 PR PBB SHADOW E&M-EST. PATIENT-LVL III: CPT | Mod: PBBFAC,HCNC,, | Performed by: SURGERY

## 2020-10-12 PROCEDURE — 3078F DIAST BP <80 MM HG: CPT | Mod: HCNC,CPTII,S$GLB, | Performed by: SURGERY

## 2020-10-12 PROCEDURE — 1101F PR PT FALLS ASSESS DOC 0-1 FALLS W/OUT INJ PAST YR: ICD-10-PCS | Mod: HCNC,CPTII,S$GLB, | Performed by: SURGERY

## 2020-10-12 PROCEDURE — 1126F AMNT PAIN NOTED NONE PRSNT: CPT | Mod: HCNC,S$GLB,, | Performed by: SURGERY

## 2020-10-12 PROCEDURE — 1159F MED LIST DOCD IN RCRD: CPT | Mod: HCNC,S$GLB,, | Performed by: SURGERY

## 2020-10-12 PROCEDURE — 99215 OFFICE O/P EST HI 40 MIN: CPT | Mod: HCNC,S$GLB,, | Performed by: SURGERY

## 2020-10-12 PROCEDURE — 3074F SYST BP LT 130 MM HG: CPT | Mod: HCNC,CPTII,S$GLB, | Performed by: SURGERY

## 2020-10-12 PROCEDURE — 3008F PR BODY MASS INDEX (BMI) DOCUMENTED: ICD-10-PCS | Mod: HCNC,CPTII,S$GLB, | Performed by: SURGERY

## 2020-10-12 PROCEDURE — 3008F BODY MASS INDEX DOCD: CPT | Mod: HCNC,CPTII,S$GLB, | Performed by: SURGERY

## 2020-10-12 PROCEDURE — 1101F PT FALLS ASSESS-DOCD LE1/YR: CPT | Mod: HCNC,CPTII,S$GLB, | Performed by: SURGERY

## 2020-10-12 PROCEDURE — 99215 PR OFFICE/OUTPT VISIT, EST, LEVL V, 40-54 MIN: ICD-10-PCS | Mod: HCNC,S$GLB,, | Performed by: SURGERY

## 2020-10-12 PROCEDURE — 1159F PR MEDICATION LIST DOCUMENTED IN MEDICAL RECORD: ICD-10-PCS | Mod: HCNC,S$GLB,, | Performed by: SURGERY

## 2020-10-12 RX ORDER — HYDROCODONE BITARTRATE AND ACETAMINOPHEN 7.5; 325 MG/1; MG/1
1 TABLET ORAL EVERY 6 HOURS PRN
Qty: 28 TABLET | Refills: 0 | Status: SHIPPED | OUTPATIENT
Start: 2020-10-12 | End: 2021-02-17

## 2020-10-12 RX ORDER — ONDANSETRON 8 MG/1
8 TABLET, ORALLY DISINTEGRATING ORAL EVERY 6 HOURS PRN
Qty: 30 TABLET | Refills: 0 | Status: SHIPPED | OUTPATIENT
Start: 2020-10-12 | End: 2020-11-24 | Stop reason: CLARIF

## 2020-10-12 NOTE — LETTER
October 12, 2020        Miguelina Weathers MD  [address] [facility]  [place] [address]             [facility] Surgery  [address]  [facility] [place] [address]  Phone: [phone]  Fax: [phone]   Patient: Janusz Montgomery Jr.   MR Number: [id number]   YOB: 1945   Date of Visit: 10/12/2020       Dear Dr. Weathers:    Thank you for referring Janusz Montgomery to me for evaluation. Below are the relevant portions of my assessment and plan of care.            If you have questions, please do not hesitate to call me. I look forward to following Janusz along with you.    Sincerely,      Sukhi Chaudhry MD           CC  No Recipients

## 2020-10-12 NOTE — PATIENT INSTRUCTIONS
Wound Care  Taking proper care of your wound will help it heal. Your healthcare provider may show you how to clean and dress the wound. He or she will also explain how to tell if the wound is healing normally. If you are unsure of how to take care of the wound, be sure to clarify what dressing to use and how often you should change the bandages. Here are the basic steps.     A wound that's not healing normally may be dark in color or have white streaks.   Wash your hands  Tips for washing your hands include:  · Use liquid soap and lather for 2 minutes. Scrub between your fingers and under your nails.  · Rinse with warm water, keeping your fingers pointing down.  · Use a paper towel to dry your hands and to turn off the faucet.  Remove the used dressing  Here are suggestions for removing the dressing:  · If dressing changes cause you pain, be sure to take your pain medicine as prescribed by your healthcare provider 30 minutes before dressing changes.  · Set up your supplies.  · Put on disposable gloves if youre dressing a wound for someone else or your wound is infected.  · Loosen the tape by pulling gently toward the wound.  · Gently take off the old dressing. If the dressing is stuck to the wound, moisten it with saline (if available) or clean water.  · If you have a drain or tube in the wound, be careful not to pull on it.  · Remove the dressing 1 layer at a time and put it in a plastic bag.  · Remove your gloves.  Inspect and dress the wound  Check the wound carefully:  · Each time you change the dressing, inspect the wound carefully to be sure its healing normally by making sure your wound appears to be pink and moist, and is free of infection.    · Wash your hands again. Put on a new pair of gloves.  · Clean and dress the wound as directed by your healthcare provider or nurse. Do not put anything in the wound that is not prescribed or directed by your healthcare provider. If you have a drain or tube, be  careful not to pull on it. Make sure to secure the drain or tube as well.  · Put all supplies in a plastic bag. Seal the bag and put it in the trash.  · Be sure to wash your hands again.  Call your healthcare provider  Call your healthcare provider if you see any of the following signs of a problem:  · Bleeding that soaks the dressing  · Pink fluid weeping from the wound  · Increased drainage or drainage that is yellow, yellow-green, or foul-smelling  · Increased swelling or pain, or redness or swelling in the skin around the wound  · A change in the color of the wound, or if streaks develop in a direction away from the wound  · The area between any stitches opens up  · An increase in the size of the wound  · A fever of 100.4°F (38°C) or higher, or as directed by your healthcare provider  · Chills, increased fatigue, or a loss of appetite      Date Last Reviewed: 7/30/2015  © 3546-7677 [employer] [address] [place] [address], [place] [address]. All rights reserved. This information is not intended as a substitute for professional medical care. Always follow your healthcare professional's instructions.

## 2020-10-12 NOTE — PROGRESS NOTES
Sukhi Chaudhry MD RPVI                       [facility]                         10/12/2020    HPI:  Janusz Montgomery Jr. is a 75 y.o. male with   Patient Active Problem List   Diagnosis    Essential hypertension    Other hyperlipidemia    Coronary artery disease involving native coronary artery of native heart without angina pectoris s/p RCA stent    Obstructive sleep apnea    GERD (gastroesophageal reflux disease)    Benign prostatic hyperplasia with urinary obstruction    Morbid obesity with body mass index of 45.0-49.9 in adult    Prostate cancer    Paroxysmal atrial fibrillation     Major depressive disorder, recurrent episode, mild    Generalized anxiety disorder    History of gout    Herpes simplex keratoconjunctivitis    Decreased range of motion of left ankle    Right leg weakness    Impaired mobility    Balance problems    PVD (peripheral vascular disease)    Bilateral leg edema    Bilateral carotid artery stenosis    Gait abnormality    Left leg weakness    Delayed sleep phase syndrome    Floppy eyelid syndrome    Dyspnea    Insomnia    Restrictive lung disease    Personal history of asbestosis    CAD (coronary artery disease)    Varicose veins of leg with complications    Delayed surgical wound healing    Venous stasis dermatitis of both lower extremities    Acute kidney injury superimposed on chronic kidney disease    CKD (chronic kidney disease) stage 3, GFR 30-59 ml/min    Hypotension    History of loop recorder    being managed by PCP and specialists who is here today for evaluation of PVD.  Presents as referral for mgmt of LLE wound; history of non healing for which bx obtained showing BCC.  Treating with cream due to poor radiation candidate.  Seeing wound care at [facility].  Patient has no complaints of claudication.  Patient states location is L medial lower leg occurring for several months.  Associated signs and symptoms include  discoloration.  Quality is heavy and severity is 6/10.  Symptoms began several months ago.  Alleviating factors include wound care.  Worsening factors include dependency.  no rest pain.  + tissue loss.  Patient is not diabetic.  Is not on Pletal.  No previous lower extremity interventions.    no MI  no Stroke  Tobacco use: no  Daily Aspirin: yes  Anticoagulation: no    10/2020: states wound is healing.      Past Medical History:   Diagnosis Date    NAT (acute kidney injury) 3/19/2017    ALLERGIC RHINITIS     Anemia     Anxiety     Basal cell carcinoma 10/19/2018    forehead and right medial shoulder    Basal cell carcinoma 01/09/2019    left nasal bridge and left posterior ear    Basal cell carcinoma 06/12/2020    left lower medial leg - Efudex    Chronic rhinitis 5/3/2013    Chronic rhinitis 5/3/2013    Coronary artery disease involving native coronary artery of native heart without angina pectoris s/p RCA stent     Cortical cataract of both eyes 3/18/2016    Delayed sleep phase syndrome 3/13/2019    Depression     Erectile dysfunction 3/24/2014    Erectile dysfunction 3/24/2014    Essential hypertension     GERD (gastroesophageal reflux disease) 7/25/2012    Gout, arthritis     Grade III open fracture of left tibia and fibula s/p ex-fix on 7/1/16 and ORIF of left tibia on 7/15 7/6/2016    H/O: iritis     Helicobacter pylori (H. pylori) infection     Treated    Herpes simplex keratoconjunctivitis 9/30/2015    - on acyclovir - followed by opthalmology, Dr. Uribe     Herpes simplex keratoconjunctivitis 9/30/2015    - on acyclovir - followed by opthalmology, Dr. Uribe     Hyperkalemia 2/28/2017    Hyperlipidemia     Hypogonadism male     Hypogonadism male     Mixed anxiety and depressive disorder     Morbid obesity     Obstructive sleep apnea on CPAP     CPAP    Osteoarthritis of left knee 7/25/2012    Paroxysmal atrial fibrillation 7/6/2016    Primary osteoarthritis of left knee  "7/25/2012    Prominent aorta 1/25/2016    "RESULTS: THE HEART IS MILDLY ENLARGED WITH A SLIGHTLY PROMINENT AORTA" - Xray Chest PA & Lateral 12-     Prostate cancer 2/15/2016    - followed by urology, Dr. Young     Prostate cancer 2/15/2016    - followed by urology, Dr. 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 Conteh    CATARACT EXTRACTION W/  INTRAOCULAR LENS IMPLANT Left 4/12/2016        COLONOSCOPY N/A 7/23/2020    Procedure: COLONOSCOPY;  Surgeon: Nico Lackey MD;  Location: [facility] ([address] [place] [address]);  Service: Endoscopy;  Laterality: N/A;    ESOPHAGOGASTRODUODENOSCOPY N/A 7/23/2020    Procedure: EGD (ESOPHAGOGASTRODUODENOSCOPY);  Surgeon: Nico Lackey MD;  Location: [facility] ([address] [place] [address]);  Service: Endoscopy;  Laterality: N/A;  per Dr Lackey-Will proceed with EGD and colonoscopy on the 2nd floor due to his comorbidities including obesity, sleep apnea, restrictive lung disease, coronary artery disease.  has loop recorder      ok to hold Eliquis 2 days per Dr Sandhu-must remain    EXTERNAL FIXATION TIBIAL FRACTURE Left 07/01/2016    INSERTION OF IMPLANTABLE LOOP RECORDER  04/07/2017    LEFT HEART CATHETERIZATION Left 1/30/2020    Procedure: Left heart cath;  Surgeon: Benjamin Sandhu MD;  Location: [facility] CATH LAB;  Service: Cardiology;  Laterality: Left;  RN PREOP 1/28/20--Pt starting Plavix loading dose today " "(8pills)- Dr. Sandhu aware.  Pt has a bandaged "non healing area to LLE"--Dr. Sandhu aware.    LEFT HEART CATHETERIZATION Left 2/14/2020    Procedure: Left heart cath, IVUS guided left main / LAD PCI. Noon start, radial approach;  Surgeon: Miguel Angel Brady MD;  Location: [facility] CATH LAB;  Service: Cardiology;  Laterality: Left;  RN PRE OP 2-7-2020  BMI--45.61    ORIF TIBIA FRACTURE Left 07/15/2016    RADIOACTIVE SEED IMPLANTATION OF PROSTATE N/A 8/8/2018    Procedure: INSERTION, RADIOACTIVE SEED, PROSTATE;  Surgeon: Bipin Thompson MD;  Location: [facility] OR [address] [place] [address];  Service: Urology;  Laterality: N/A;  1 hour    Squamous cell cancer removal x3 with Mohs surgery      TONSILLECTOMY      TOTAL KNEE ARTHROPLASTY  10/2012    trus/bx      ULTRASOUND GUIDANCE  2/14/2020    Procedure: Ultrasound Guidance;  Surgeon: Miguel Angel Brady MD;  Location: [facility] CATH LAB;  Service: Cardiology;;     Family History   Problem Relation Age of Onset    Skin cancer Father     Lung cancer Father     Cancer Father         smoker,     Alzheimer's disease Mother     Hypertension Mother     Cancer Sister         colon, lung cancer     No Known Problems Sister     Cancer Brother         skin cancer, polypectomy     Peripheral vascular disease Unknown     No Known Problems Maternal Aunt     No Known Problems Maternal Uncle     No Known Problems Paternal Aunt     No Known Problems Paternal Uncle     No Known Problems Maternal Grandmother     No Known Problems Maternal Grandfather     No Known Problems Paternal Grandmother     No Known Problems Paternal Grandfather     Melanoma Neg Hx     Psoriasis Neg Hx     Lupus Neg Hx     Eczema Neg Hx     Amblyopia Neg Hx     Blindness Neg Hx     Cataracts Neg Hx     Diabetes Neg Hx     Glaucoma Neg Hx     Macular degeneration Neg Hx     Retinal detachment Neg Hx     Strabismus Neg Hx     Stroke Neg Hx     Thyroid disease Neg Hx     Acne Neg Hx      Social History "     Socioeconomic History    Marital status:      Spouse name: Not on file    Number of children: Not on file    Years of education: Not on file    Highest education level: Not on file   Occupational History    Occupation: Retired    Social Needs    Financial resource strain: Not hard at all    Food insecurity     Worry: Never true     Inability: Never true    Transportation needs     Medical: No     Non-medical: No   Tobacco Use    Smoking status: Never Smoker    Smokeless tobacco: Never Used   Substance and Sexual Activity    Alcohol use: Yes     Frequency: 2-4 times a month     Drinks per session: 3 or 4     Binge frequency: Never     Comment: occasionally, beer    Drug use: Never    Sexual activity: Yes     Partners: Female   Lifestyle    Physical activity     Days per week: 0 days     Minutes per session: 0 min    Stress: Only a little   Relationships    Social connections     Talks on phone: Twice a week     Gets together: Once a week     Attends Christianity service: More than 4 times per year     Active member of club or organization: Yes     Attends meetings of clubs or organizations: More than 4 times per year     Relationship status:    Other Topics Concern    Not on file   Social History Narrative    Not on file       Current Outpatient Medications:     acyclovir (ZOVIRAX) 800 MG Tab, Take 1 tablet (800 mg total) by mouth 2 (two) times daily., Disp: 180 tablet, Rfl: 6    albuterol (PROVENTIL/VENTOLIN HFA) 90 mcg/actuation inhaler, Inhale 2 puffs into the lungs every 6 (six) hours as needed for Wheezing. Rescue, Disp: 18 g, Rfl: 0    artificial tears (ISOPTO TEARS) 0.5 % ophthalmic solution, Place 1 drop into both eyes as needed., Disp: , Rfl:     atorvastatin (LIPITOR) 40 MG tablet, Take 1 tablet (40 mg total) by mouth once daily., Disp: 90 tablet, Rfl: 1    clopidogreL (PLAVIX) 75 mg tablet, Take 1 tablet (75 mg total) by mouth once daily., Disp: 90 tablet, Rfl: 3     CYANOCOBALAMIN, VITAMIN B-12, (VITAMIN B-12 ORAL), Take 2,500 mcg by mouth once daily., Disp: , Rfl:     gabapentin (NEURONTIN) 300 MG capsule, Take 1 capsule (300 mg total) by mouth 3 (three) times daily., Disp: 270 capsule, Rfl: 1    melatonin 5 mg Tab, Take 10 mg by mouth nightly., Disp: , Rfl:     metoprolol succinate (TOPROL-XL) 25 MG 24 hr tablet, Take 25 mg by mouth once daily. , Disp: , Rfl:     multivitamin (MULTIPLE VITAMINS) per tablet, Take 1 tablet by mouth once daily., Disp: , Rfl:     omeprazole (PRILOSEC) 20 MG capsule, Take 1 capsule (20 mg total) by mouth daily as needed., Disp: 90 capsule, Rfl: 0    oxybutynin (DITROPAN-XL) 5 MG TR24, Take 1 tablet (5 mg total) by mouth once daily., Disp: 30 tablet, Rfl: 11    tamsulosin (FLOMAX) 0.4 mg Cap, Take 1 capsule (0.4 mg total) by mouth once daily., Disp: 90 capsule, Rfl: 3    venlafaxine (EFFEXOR) 75 MG tablet, Take 2 tablets (150 mg total) by mouth 2 (two) times daily., Disp: 360 tablet, Rfl: 1    vitamin D 1000 units Tab, Take 1 tablet (1,000 Units total) by mouth once daily., Disp: , Rfl:     apixaban (ELIQUIS) 5 mg Tab, Take 1 tablet (5 mg total) by mouth 2 (two) times daily. (Patient not taking: Reported on 10/12/2020), Disp: 180 tablet, Rfl: 4    fluorouraciL (EFUDEX) 5 % cream, AAA left lower medial leg bid x 4 weeks (Patient not taking: Reported on 10/12/2020), Disp: 40 g, Rfl: 1    fluticasone propionate (FLONASE) 50 mcg/actuation nasal spray, 1 spray (50 mcg total) by Each Nostril route once daily. (Patient not taking: Reported on 10/12/2020), Disp: 1 Bottle, Rfl: 0    nitroGLYCERIN (NITROSTAT) 0.4 MG SL tablet, Place 1 tablet (0.4 mg total) under the tongue every 5 (five) minutes as needed for Chest pain. (Patient not taking: Reported on 10/12/2020), Disp: 25 tablet, Rfl: 11    triamcinolone acetonide 0.1% (KENALOG) 0.1 % cream, Apply topically 2 (two) times daily. Apply to affected area twice daily as directed. (Patient not  taking: Reported on 10/12/2020), Disp: 453.6 g, Rfl: 0    varicella-zoster gE-AS01B, PF, (SHINGRIX, PF,) 50 mcg/0.5 mL injection, Inject into the muscle. (Patient not taking: Reported on 10/12/2020), Disp: 1 each, Rfl: 0    REVIEW OF SYSTEMS:  General: No fevers or chills; ENT: No sore throat; Allergy and Immunology: no persistent infections; Hematological and Lymphatic: No history of bleeding or easy bruising; Endocrine: negative; Respiratory: no cough, shortness of breath, or wheezing; Cardiovascular: no chest pain or dyspnea on exertion; no claudication, no rest pain; Gastrointestinal: no abdominal pain/back, change in bowel habits, or bloody stools; Genito-Urinary: no dysuria, trouble voiding, or hematuria; Musculoskeletal: negative, + wound; Neurological: no TIA or stroke symptoms; Psychiatric: no nervousness, anxiety or depression.    PHYSICAL EXAM:                General appearance:  Alert, well-appearing, and in no distress.  Oriented to person, place, and time                    Neurological: Normal speech, no focal findings noted; CN II - XII grossly intact. RLE with sensation to light touch, LLE with sensation to light touch.            Musculoskeletal: Digits/nail without cyanosis/clubbing.  Strength 5/5 BLE.                    Neck: Supple, no significant adenopathy                  Chest:  Clear to auscultation, no wheezes, rales or rhonchi, symmetric air entry. No use of accessory muscles               Cardiac: Normal rate and regular rhythm, S1 and S2 normal            Abdomen: Soft, nontender, nondistended, no masses or organomegaly, no hernia     No rebound tenderness noted; bowel sounds normal          No groin adenopathy      Extremities:2+ R femoral pulse, 2+ L femoral pulse     2+ R popliteal pulse, 2+ L popliteal pulse     1+ R PT pulse, 1+ L PT pulse     1+ R DP pulse, 1+ L DP pulse     1+ RLE edema, 2+ LLE edema    Skin: RLE no tissue loss; LLE + tissue loss    LAB RESULTS:  No results  found for: CBC  Lab Results   Component Value Date    LABPROT 10.5 01/28/2020    INR 1.0 01/28/2020     Lab Results   Component Value Date     09/04/2020    K 5.2 (H) 09/04/2020     (H) 09/04/2020    CO2 23 09/04/2020     09/04/2020    BUN 37 (H) 09/04/2020    CREATININE 1.9 (H) 09/04/2020    CALCIUM 9.0 09/04/2020    ANIONGAP 6 (L) 09/04/2020    EGFRNONAA 34.2 (A) 09/04/2020     Lab Results   Component Value Date    WBC 9.37 08/31/2020    RBC 3.25 (L) 08/31/2020    HGB 10.6 (L) 08/31/2020    HCT 33.3 (L) 08/31/2020     (H) 08/31/2020    MCH 32.6 (H) 08/31/2020    MCHC 31.8 (L) 08/31/2020    RDW 13.3 08/31/2020     (L) 08/31/2020    MPV 11.1 08/31/2020    GRAN 5.9 08/31/2020    GRAN 63.4 08/31/2020    LYMPH 2.4 08/31/2020    LYMPH 25.7 08/31/2020    MONO 0.5 08/31/2020    MONO 5.3 08/31/2020    EOS 0.4 08/31/2020    BASO 0.07 08/31/2020    EOSINOPHIL 4.7 08/31/2020    BASOPHIL 0.7 08/31/2020    DIFFMETHOD Automated 08/31/2020     .  Lab Results   Component Value Date    HGBA1C 5.6 02/11/2020       IMAGING:  All pertinent imaging has been reviewed and interpreted independently.    SAMANTHA 9/2020: 0.8/0.7    6/2020: No venous reflux or DVT    10/2020:  No reflux or DVT.    IMP/PLAN:  75 y.o. male with   Patient Active Problem List   Diagnosis    Essential hypertension    Other hyperlipidemia    Coronary artery disease involving native coronary artery of native heart without angina pectoris s/p RCA stent    Obstructive sleep apnea    GERD (gastroesophageal reflux disease)    Benign prostatic hyperplasia with urinary obstruction    Morbid obesity with body mass index of 45.0-49.9 in adult    Prostate cancer    Paroxysmal atrial fibrillation     Major depressive disorder, recurrent episode, mild    Generalized anxiety disorder    History of gout    Herpes simplex keratoconjunctivitis    Decreased range of motion of left ankle    Right leg weakness    Impaired mobility     Balance problems    PVD (peripheral vascular disease)    Bilateral leg edema    Bilateral carotid artery stenosis    Gait abnormality    Left leg weakness    Delayed sleep phase syndrome    Floppy eyelid syndrome    Dyspnea    Insomnia    Restrictive lung disease    Personal history of asbestosis    CAD (coronary artery disease)    Varicose veins of leg with complications    Delayed surgical wound healing    Venous stasis dermatitis of both lower extremities    Acute kidney injury superimposed on chronic kidney disease    CKD (chronic kidney disease) stage 3, GFR 30-59 ml/min    Hypotension    History of loop recorder    being managed by PCP and specialists who is here today for evaluation of PVD.    -LLE edema with BCC wound medial lower leg - recommend compression, elevation, dietary changes associated with water and sodium intake discussed at length with patient; cont wound care  -wound is multifactorial likely due to cancer, edema and PVD  -Mild BLE PVD with no claudication, no rest pain, + wound.  Imaging reviewed. - rec daily ASA, perfusion appears adequate to heal wound.  Cont wound care; if wound is not healing, will discuss angio with risks of MARCIA with known CKD  -Exercise  -Heart healthy lifestyle  -RTC 8 weeks    I spent 12 minutes evaluating this patient and greater than 50% of the time was spent counseling, coordinator care and discussing the plan of care.  All questions were answered and patient stated understanding with agreement with the above treatment plan.    Sukhi Chaudhry MD [facility]  Vascular and Endovascular Surgery

## 2020-10-13 ENCOUNTER — TELEPHONE (OUTPATIENT)
Dept: ORTHOPEDICS | Facility: CLINIC | Age: 75
End: 2020-10-13

## 2020-10-13 LAB
LEFT GREAT SAPHENOUS DISTAL THIGH DIA: 0.3 CM
LEFT GREAT SAPHENOUS JUNCTION DIA: 0.8 CM
LEFT GREAT SAPHENOUS KNEE DIA: 0.3 CM
LEFT GREAT SAPHENOUS MIDDLE THIGH DIA: 0.4 CM
LEFT GREAT SAPHENOUS PROXIMAL CALF DIA: 0.3 CM
LEFT SMALL SAPHENOUS KNEE DIA: 0.3 CM
LEFT SMALL SAPHENOUS SPJ DIA: 0.3 CM
RIGHT GREAT SAPHENOUS DISTAL THIGH DIA: 0.6 CM
RIGHT GREAT SAPHENOUS JUNCTION DIA: 0.7 CM
RIGHT GREAT SAPHENOUS KNEE DIA: 0.4 CM
RIGHT GREAT SAPHENOUS MIDDLE THIGH DIA: 0.5 CM
RIGHT GREAT SAPHENOUS PROXIMAL CALF DIA: 0.3 CM
RIGHT SMALL SAPHENOUS KNEE DIA: 0.2 CM
RIGHT SMALL SAPHENOUS SPJ DIA: 0.2 CM
SARS-COV-2 RNA RESP QL NAA+PROBE: NOT DETECTED

## 2020-10-14 ENCOUNTER — HOSPITAL ENCOUNTER (OUTPATIENT)
Facility: HOSPITAL | Age: 75
Discharge: HOME | End: 2020-10-14
Attending: ORTHOPAEDIC SURGERY | Admitting: ORTHOPAEDIC SURGERY
Payer: MEDICARE

## 2020-10-14 ENCOUNTER — TELEPHONE (OUTPATIENT)
Dept: DERMATOLOGY | Facility: CLINIC | Age: 75
End: 2020-10-14

## 2020-10-14 ENCOUNTER — ANESTHESIA (OUTPATIENT)
Dept: SURGERY | Facility: HOSPITAL | Age: 75
End: 2020-10-14
Payer: MEDICARE

## 2020-10-14 ENCOUNTER — OFFICE VISIT (OUTPATIENT)
Dept: WOUND CARE | Facility: CLINIC | Age: 75
End: 2020-10-14
Payer: MEDICARE

## 2020-10-14 VITALS
HEIGHT: 73 IN | OXYGEN SATURATION: 100 % | HEART RATE: 64 BPM | WEIGHT: 315 LBS | TEMPERATURE: 98 F | SYSTOLIC BLOOD PRESSURE: 128 MMHG | RESPIRATION RATE: 43 BRPM | BODY MASS INDEX: 41.75 KG/M2 | DIASTOLIC BLOOD PRESSURE: 56 MMHG

## 2020-10-14 VITALS
DIASTOLIC BLOOD PRESSURE: 67 MMHG | HEART RATE: 80 BPM | TEMPERATURE: 98 F | HEIGHT: 73 IN | RESPIRATION RATE: 18 BRPM | WEIGHT: 315 LBS | SYSTOLIC BLOOD PRESSURE: 124 MMHG | BODY MASS INDEX: 41.75 KG/M2

## 2020-10-14 DIAGNOSIS — I87.2 VENOUS STASIS DERMATITIS OF BOTH LOWER EXTREMITIES: ICD-10-CM

## 2020-10-14 DIAGNOSIS — M65.311 TRIGGER FINGER OF RIGHT THUMB: ICD-10-CM

## 2020-10-14 DIAGNOSIS — R60.0 BILATERAL LEG EDEMA: ICD-10-CM

## 2020-10-14 DIAGNOSIS — T81.89XA DELAYED SURGICAL WOUND HEALING, INITIAL ENCOUNTER: Primary | ICD-10-CM

## 2020-10-14 DIAGNOSIS — M65.311 TRIGGER THUMB, RIGHT THUMB: ICD-10-CM

## 2020-10-14 DIAGNOSIS — I83.899 VARICOSE VEINS OF LEG WITH COMPLICATIONS: ICD-10-CM

## 2020-10-14 PROCEDURE — 25000003 PHARM REV CODE 250: Performed by: ORTHOPAEDIC SURGERY

## 2020-10-14 PROCEDURE — 37000008 HC ANESTHESIA 1ST 15 MINUTES: Performed by: ORTHOPAEDIC SURGERY

## 2020-10-14 PROCEDURE — 99999 PR PBB SHADOW E&M-EST. PATIENT-LVL V: ICD-10-PCS | Mod: PBBFAC,HCNC,, | Performed by: NURSE PRACTITIONER

## 2020-10-14 PROCEDURE — D9220A PRA ANESTHESIA: ICD-10-PCS | Mod: CRNA,,, | Performed by: NURSE ANESTHETIST, CERTIFIED REGISTERED

## 2020-10-14 PROCEDURE — 99499 UNLISTED E&M SERVICE: CPT | Mod: HCNC,S$GLB,, | Performed by: NURSE PRACTITIONER

## 2020-10-14 PROCEDURE — 99499 NO LOS: ICD-10-PCS | Mod: HCNC,S$GLB,, | Performed by: NURSE PRACTITIONER

## 2020-10-14 PROCEDURE — 99900035 HC TECH TIME PER 15 MIN (STAT)

## 2020-10-14 PROCEDURE — 71000015 HC POSTOP RECOV 1ST HR: Performed by: ORTHOPAEDIC SURGERY

## 2020-10-14 PROCEDURE — 99999 PR PBB SHADOW E&M-EST. PATIENT-LVL V: CPT | Mod: PBBFAC,HCNC,, | Performed by: NURSE PRACTITIONER

## 2020-10-14 PROCEDURE — 71000033 HC RECOVERY, INTIAL HOUR: Performed by: ORTHOPAEDIC SURGERY

## 2020-10-14 PROCEDURE — 63600175 PHARM REV CODE 636 W HCPCS: Performed by: NURSE ANESTHETIST, CERTIFIED REGISTERED

## 2020-10-14 PROCEDURE — 94761 N-INVAS EAR/PLS OXIMETRY MLT: CPT

## 2020-10-14 PROCEDURE — 36000706: Performed by: ORTHOPAEDIC SURGERY

## 2020-10-14 PROCEDURE — 26055 PR INCISE FINGER TENDON SHEATH: ICD-10-PCS | Mod: F5,,, | Performed by: ORTHOPAEDIC SURGERY

## 2020-10-14 PROCEDURE — 26055 INCISE FINGER TENDON SHEATH: CPT | Mod: F5,,, | Performed by: ORTHOPAEDIC SURGERY

## 2020-10-14 PROCEDURE — 29580 STRAPPING UNNA BOOT: CPT | Mod: HCNC,LT,S$GLB, | Performed by: NURSE PRACTITIONER

## 2020-10-14 PROCEDURE — D9220A PRA ANESTHESIA: Mod: CRNA,,, | Performed by: NURSE ANESTHETIST, CERTIFIED REGISTERED

## 2020-10-14 PROCEDURE — 25000003 PHARM REV CODE 250: Performed by: STUDENT IN AN ORGANIZED HEALTH CARE EDUCATION/TRAINING PROGRAM

## 2020-10-14 PROCEDURE — 27201423 OPTIME MED/SURG SUP & DEVICES STERILE SUPPLY: Performed by: ORTHOPAEDIC SURGERY

## 2020-10-14 PROCEDURE — D9220A PRA ANESTHESIA: Mod: ANES,,, | Performed by: ANESTHESIOLOGY

## 2020-10-14 PROCEDURE — 29580 PR STRAPPING UNNA BOOT: ICD-10-PCS | Mod: HCNC,LT,S$GLB, | Performed by: NURSE PRACTITIONER

## 2020-10-14 PROCEDURE — 36000707: Performed by: ORTHOPAEDIC SURGERY

## 2020-10-14 PROCEDURE — 37000009 HC ANESTHESIA EA ADD 15 MINS: Performed by: ORTHOPAEDIC SURGERY

## 2020-10-14 PROCEDURE — 25000003 PHARM REV CODE 250: Performed by: NURSE ANESTHETIST, CERTIFIED REGISTERED

## 2020-10-14 PROCEDURE — D9220A PRA ANESTHESIA: ICD-10-PCS | Mod: ANES,,, | Performed by: ANESTHESIOLOGY

## 2020-10-14 RX ORDER — HYDROCODONE BITARTRATE AND ACETAMINOPHEN 5; 325 MG/1; MG/1
1 TABLET ORAL EVERY 4 HOURS PRN
Status: DISCONTINUED | OUTPATIENT
Start: 2020-10-14 | End: 2020-10-14 | Stop reason: HOSPADM

## 2020-10-14 RX ORDER — SODIUM CHLORIDE 0.9 % (FLUSH) 0.9 %
10 SYRINGE (ML) INJECTION
Status: DISCONTINUED | OUTPATIENT
Start: 2020-10-14 | End: 2020-10-14 | Stop reason: HOSPADM

## 2020-10-14 RX ORDER — ACETAMINOPHEN 325 MG/1
650 TABLET ORAL EVERY 4 HOURS PRN
Status: DISCONTINUED | OUTPATIENT
Start: 2020-10-14 | End: 2020-10-14 | Stop reason: HOSPADM

## 2020-10-14 RX ORDER — SODIUM CHLORIDE 0.9 % (FLUSH) 0.9 %
3 SYRINGE (ML) INJECTION
Status: DISCONTINUED | OUTPATIENT
Start: 2020-10-14 | End: 2020-10-14 | Stop reason: HOSPADM

## 2020-10-14 RX ORDER — LIDOCAINE HYDROCHLORIDE 20 MG/ML
INJECTION INTRAVENOUS
Status: DISCONTINUED | OUTPATIENT
Start: 2020-10-14 | End: 2020-10-14

## 2020-10-14 RX ORDER — PROPOFOL 10 MG/ML
VIAL (ML) INTRAVENOUS
Status: DISCONTINUED | OUTPATIENT
Start: 2020-10-14 | End: 2020-10-14

## 2020-10-14 RX ORDER — MIDAZOLAM HYDROCHLORIDE 1 MG/ML
INJECTION, SOLUTION INTRAMUSCULAR; INTRAVENOUS
Status: DISCONTINUED | OUTPATIENT
Start: 2020-10-14 | End: 2020-10-14

## 2020-10-14 RX ORDER — HYDROCODONE BITARTRATE AND ACETAMINOPHEN 10; 325 MG/1; MG/1
1 TABLET ORAL EVERY 4 HOURS PRN
Status: DISCONTINUED | OUTPATIENT
Start: 2020-10-14 | End: 2020-10-14 | Stop reason: HOSPADM

## 2020-10-14 RX ORDER — ONDANSETRON 4 MG/1
8 TABLET, ORALLY DISINTEGRATING ORAL EVERY 8 HOURS PRN
Status: DISCONTINUED | OUTPATIENT
Start: 2020-10-14 | End: 2020-10-14 | Stop reason: HOSPADM

## 2020-10-14 RX ORDER — MUPIROCIN 20 MG/G
OINTMENT TOPICAL
Status: DISCONTINUED | OUTPATIENT
Start: 2020-10-14 | End: 2020-10-14 | Stop reason: HOSPADM

## 2020-10-14 RX ORDER — LIDOCAINE HYDROCHLORIDE 10 MG/ML
1 INJECTION, SOLUTION EPIDURAL; INFILTRATION; INTRACAUDAL; PERINEURAL ONCE
Status: DISCONTINUED | OUTPATIENT
Start: 2020-10-14 | End: 2020-11-05

## 2020-10-14 RX ORDER — SODIUM CHLORIDE 9 MG/ML
INJECTION, SOLUTION INTRAVENOUS CONTINUOUS
Status: DISCONTINUED | OUTPATIENT
Start: 2020-10-14 | End: 2020-10-14 | Stop reason: HOSPADM

## 2020-10-14 RX ORDER — ACETAMINOPHEN 500 MG
1000 TABLET ORAL
Status: COMPLETED | OUTPATIENT
Start: 2020-10-14 | End: 2020-10-14

## 2020-10-14 RX ORDER — LIDOCAINE HYDROCHLORIDE 10 MG/ML
INJECTION INFILTRATION; PERINEURAL
Status: DISCONTINUED | OUTPATIENT
Start: 2020-10-14 | End: 2020-10-14 | Stop reason: HOSPADM

## 2020-10-14 RX ORDER — MUPIROCIN 20 MG/G
OINTMENT TOPICAL 2 TIMES DAILY
Status: DISCONTINUED | OUTPATIENT
Start: 2020-10-14 | End: 2020-10-14 | Stop reason: HOSPADM

## 2020-10-14 RX ADMIN — SODIUM CHLORIDE 1000 ML: 0.9 INJECTION, SOLUTION INTRAVENOUS at 07:10

## 2020-10-14 RX ADMIN — MIDAZOLAM HYDROCHLORIDE 2 MG: 1 INJECTION, SOLUTION INTRAMUSCULAR; INTRAVENOUS at 07:10

## 2020-10-14 RX ADMIN — LIDOCAINE HYDROCHLORIDE 100 MG: 20 INJECTION, SOLUTION INTRAVENOUS at 07:10

## 2020-10-14 RX ADMIN — MUPIROCIN: 20 OINTMENT TOPICAL at 07:10

## 2020-10-14 RX ADMIN — PROPOFOL 50 MG: 10 INJECTION, EMULSION INTRAVENOUS at 07:10

## 2020-10-14 RX ADMIN — ACETAMINOPHEN 1000 MG: 500 TABLET ORAL at 07:10

## 2020-10-14 NOTE — ANESTHESIA PREPROCEDURE EVALUATION
10/14/2020  Janusz Montgomery Jr. is a 75 y.o., male.    Anesthesia Evaluation    I have reviewed the Patient Summary Reports.      I have reviewed the Medications.     Review of Systems  Anesthesia Hx:  No problems with previous Anesthesia  History of prior surgery of interest to airway management or planning: Previous anesthesia: General   Social:  Non-Smoker, Social Alcohol Use    Hematology/Oncology:  Hematology Normal   Oncology Normal     EENT/Dental:EENT/Dental Normal   Cardiovascular:   Exercise tolerance: good Hypertension, well controlled CAD      Pulmonary:   Shortness of breath Sleep Apnea    Renal/:   Chronic Renal Disease    Hepatic/GI:   GERD    Musculoskeletal:   Arthritis     Neurological:  Neurology Normal    Endocrine:  Endocrine Normal    Dermatological:  Skin Normal    Psych:   Psychiatric History          Physical Exam  General:  Morbid Obesity    Airway/Jaw/Neck:  Airway Findings: Mouth Opening: Normal Tongue: Normal  General Airway Assessment: Adult  Mallampati: II  TM Distance: Normal, at least 6 cm  Jaw/Neck Findings:  Neck ROM: Normal ROM      Dental:  Dental Findings: In tact        Mental Status:  Mental Status Findings:  Cooperative, Alert and Oriented         Anesthesia Plan  Type of Anesthesia, risks & benefits discussed:  Anesthesia Type:  MAC  Patient's Preference: MAC  Intra-op Monitoring Plan: standard ASA monitors  Intra-op Monitoring Plan Comments:   Post Op Pain Control Plan: multimodal analgesia  Post Op Pain Control Plan Comments:   Induction:    Beta Blocker:  Patient is on a Beta-Blocker and has received one dose within the past 24 hours (No further documentation required).       Informed Consent: Patient understands risks and agrees with Anesthesia plan.  Questions answered. Anesthesia consent signed with patient.  ASA Score: 3     Day of Surgery Review of History &  Physical:  There are no significant changes.  H&P update referred to the surgeon.         Ready For Surgery From Anesthesia Perspective.

## 2020-10-14 NOTE — PROGRESS NOTES
Pre op completed. All concerns addressed. Patient belongings to be placed in locker. Bed in lowest position. Call light within reach. Family at beside.

## 2020-10-14 NOTE — OP NOTE
DATE OF PROCEDURE:  10/14/2020     SERVICE:  Orthopedics.     SURGEON:  Rad Swift M.D.     FIRST ASSISTANT:  SMA Xenia     PREOPERATIVE DIAGNOSIS:  Right thumb trigger finger.     POSTOPERATIVE DIAGNOSIS:  Right thumb trigger finger.     PROCEDURE PERFORMED:  A1 pulley release of right thumb.     ANESTHESIA:  Local MAC.     ESTIMATED BLOOD LOSS:  1 mL.     SPECIMENS:  None.     IMPLANTS:  None.     COMPLICATIONS:  None.     INTRAVENOUS FLUIDS:  Crystalloid.     TOURNIQUET TIME:  10 minutes at 250mmHg.     INDICATIONS FOR PROCEDURE:  The patient is a 75-year-old male who   presented to the Orthopedics Clinic complaining of a significantly symptomatic   Right thumb trigger finger.  The risks, benefits and alternatives to surgery were discussed with the patient in detail.  Specific risks discussed include but are not limited to bleeding, infection, vessel and/or nerve damage, pain, numbness, tingling, complex regional pain syndrome, compartment syndrome, failure to return to pre-injury and/or preoperative functional status, scar sensitivity, delayed healing, inability to return to work, pulley injury, tendon injury, bowstringing, partial and/or incomplete relief of symptoms, weakness, persistence of and/or worsening of symptoms, surgical failure, osteomyelitis, amputation, loss of function, stiffness, functional debility, dysfunction, decreased  strength, need for prolonged postoperative rehabilitation, need for further surgery, deep venous thrombosis, pulmonary embolism, arthritis and death.  The patient states an understanding and wishes to proceed with surgery.   All questions were answered.  No guarantees were implied or stated.  Written informed consent was obtained.        PROCEDURE IN DETAIL:  On the date of the operative intervention, the patient was   evaluated in the preoperative holding area.  With their  participation, the operative finger was marked as the operative site.  The patient was then  wheeled to the   Operating Room with the right  upper extremity placed on a hand table.  A   nonsterile tourniquet was placed on the patient's forearm.  The extremity was prepped and draped in the usual sterile fashion.  A timeout   was taken to confirm the correct patient, site and procedure.  All were in   agreement, so I proceeded.  I utilized 1% plain lidocaine for local anesthesia   in the area overlying the right thumb A1 pulley.  After adequate   analgesia, an Esmarch was utilized to exsanguinate the extremity and   then tourniquet was insufflated to 250 mmHg where it remained for the duration   of the procedure.  I then marked out an approximately 1 cm incision overlying   the patient's A1 pulley.  This incision was made   sharply with a scalpel and dissection was carried down through the skin and   subcutaneous tissues.  The neurovascular bundles were retracted both radially   and ulnarly and protected for the duration of the procedure.  Dissection was   carried down more deeply to the level of the A1 pulley.    This was identified and exposed and then a scalpel was utilized to enter the   flexor tendon sheath with a small poke hole in the pulley.  At this time,   scissor dissection was then utilized to complete both proximal and distal   division of the A1 pulley in its entirety.  After direct visual confirmation   that the pulley transection was complete, I then utilized a Ragnell retractor to   retract the flexor tendon out of the wound, which did so without any   resistance and the thumb was taken through range of motion and were noted to   glide smoothly without any evidence of further constriction.   Sedation was then discontinued and the patient was awakened.  He was asked to actively flex and extend the right thumb IP joint which he was able to do.  He confirmed that there was no further triggering or mechanical clicking and that his preoperative symptoms were resolved.  He was very pleased.  At  this time, the wound was then irrigated copiously   with sterile saline and closed with 4-0 nylon in horizontal mattress fashion.    Sterile dressings were then applied consisting of Xeroform, 4 x 4 gauze, cast   padding and loose coban.  The tourniquet was then   deflated and brisk capillary refill ensued throughout the patient's hand,   specifically to the operative finger.  The patient was then awakened from anesthesia   and returned to the Postanesthesia Care Unit in stable condition.  There were no   complications.  As the attending surgeon, I was present and performed the   critical portion of the procedure.     POSTOPERATIVE PLAN FOR THE PATIENT:  The patient will be discharged home in   stable condition.  I will reevaluate the patient in clinic in approximately 2 weeks for   suture removal and reevaluation of the postoperative plan.

## 2020-10-14 NOTE — PROGRESS NOTES
Subjective:       Patient ID: Janusz Montgomery Jr. is a 75 y.o. male.    Chief Complaint: Wound Check    Wound Check     75 y.o. male referred by Dr. Kumar for evaluation and management of an open wound to the left lower leg.  He was seen by Dr. Kumar on 6/12/20 at which time he presented for evaluation of his wound which had been present for the past year. He requested and underwent a biopsy of the leg wound on that date.  The biopsy was positive for BCC.  He used efudex for about a month.  A coflex compression wrap was placed on the leg on his last visit.  The wound is healing as evidenced by increased granulation and wound contracture. He has neuropathy and is on gabapentin.  He is afebrile. He denies increased redness, swelling or purulent drainage.  He does not complain of pain.     Patient Active Problem List   Diagnosis    Essential hypertension    Other hyperlipidemia    Coronary artery disease involving native coronary artery of native heart without angina pectoris s/p RCA stent    Obstructive sleep apnea    GERD (gastroesophageal reflux disease)    Benign prostatic hyperplasia with urinary obstruction    Morbid obesity with body mass index of 45.0-49.9 in adult    Prostate cancer    Paroxysmal atrial fibrillation     Major depressive disorder, recurrent episode, mild    Generalized anxiety disorder    History of gout    Herpes simplex keratoconjunctivitis    Decreased range of motion of left ankle    Right leg weakness    Impaired mobility    Balance problems    PVD (peripheral vascular disease)    Bilateral leg edema    Bilateral carotid artery stenosis    Gait abnormality    Left leg weakness    Delayed sleep phase syndrome    Floppy eyelid syndrome    Dyspnea    Insomnia    Restrictive lung disease    Personal history of asbestosis    CAD (coronary artery disease)    Varicose veins of leg with complications    Delayed surgical wound healing    Venous stasis  "dermatitis of both lower extremities    Acute kidney injury superimposed on chronic kidney disease    CKD (chronic kidney disease) stage 3, GFR 30-59 ml/min    Hypotension    History of loop recorder    Trigger thumb, right thumb     Past Medical History:   Diagnosis Date    NAT (acute kidney injury) 3/19/2017    ALLERGIC RHINITIS     Anemia     Anxiety     Basal cell carcinoma 10/19/2018    forehead and right medial shoulder    Basal cell carcinoma 01/09/2019    left nasal bridge and left posterior ear    Basal cell carcinoma 06/12/2020    left lower medial leg - Efudex    Chronic rhinitis 5/3/2013    Chronic rhinitis 5/3/2013    Coronary artery disease involving native coronary artery of native heart without angina pectoris s/p RCA stent     Cortical cataract of both eyes 3/18/2016    Delayed sleep phase syndrome 3/13/2019    Depression     Erectile dysfunction 3/24/2014    Erectile dysfunction 3/24/2014    Essential hypertension     GERD (gastroesophageal reflux disease) 7/25/2012    Gout, arthritis     Grade III open fracture of left tibia and fibula s/p ex-fix on 7/1/16 and ORIF of left tibia on 7/15 7/6/2016    H/O: iritis     Helicobacter pylori (H. pylori) infection     Treated    Herpes simplex keratoconjunctivitis 9/30/2015    - on acyclovir - followed by opthalmology, Dr. Uribe     Herpes simplex keratoconjunctivitis 9/30/2015    - on acyclovir - followed by opthalmology, Dr. Uribe     Hyperkalemia 2/28/2017    Hyperlipidemia     Hypogonadism male     Hypogonadism male     Mixed anxiety and depressive disorder     Morbid obesity     Obstructive sleep apnea on CPAP     CPAP    Osteoarthritis of left knee 7/25/2012    Paroxysmal atrial fibrillation 7/6/2016    Primary osteoarthritis of left knee 7/25/2012    Prominent aorta 1/25/2016    "RESULTS: THE HEART IS MILDLY ENLARGED WITH A SLIGHTLY PROMINENT AORTA" - Xray Chest PA & Lateral 12-     Prostate cancer " "2/15/2016    - followed by urology, Dr. Young     Prostate cancer 2/15/2016    - followed by urology, Dr. 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 Conteh    CATARACT EXTRACTION W/  INTRAOCULAR LENS IMPLANT Left 4/12/2016        COLONOSCOPY N/A 7/23/2020    Procedure: COLONOSCOPY;  Surgeon: Nico Lackey MD;  Location: [facility] ([address] [place] [address]);  Service: Endoscopy;  Laterality: N/A;    ESOPHAGOGASTRODUODENOSCOPY N/A 7/23/2020    Procedure: EGD (ESOPHAGOGASTRODUODENOSCOPY);  Surgeon: Nico Lackey MD;  Location: [facility] ([address] [place] [address]);  Service: Endoscopy;  Laterality: N/A;  per Dr Lackey-Will proceed with EGD and colonoscopy on the 2nd floor due to his comorbidities including obesity, sleep apnea, restrictive lung disease, coronary artery disease.  has loop recorder      ok to hold Eliquis 2 days per Dr Sandhu-must remain    EXTERNAL FIXATION TIBIAL FRACTURE Left 07/01/2016    INSERTION OF IMPLANTABLE LOOP RECORDER  04/07/2017    LEFT HEART CATHETERIZATION Left 1/30/2020    Procedure: Left heart cath;  Surgeon: Benjamin Sandhu MD;  Location: [facility] CATH LAB;  Service: Cardiology;  Laterality: Left;  RN PREOP 1/28/20--Pt starting Plavix loading dose today (8pills)- Dr. Sandhu aware.  Pt has a bandaged "non healing area to LLE"--Dr. Sandhu aware.    LEFT HEART CATHETERIZATION Left 2/14/2020    Procedure: Left heart cath, IVUS guided " left main / LAD PCI. Noon start, radial approach;  Surgeon: Miguel Angel Brady MD;  Location: [facility] CATH LAB;  Service: Cardiology;  Laterality: Left;  RN PRE OP 2-7-2020  BMI--45.61    ORIF TIBIA FRACTURE Left 07/15/2016    RADIOACTIVE SEED IMPLANTATION OF PROSTATE N/A 8/8/2018    Procedure: INSERTION, RADIOACTIVE SEED, PROSTATE;  Surgeon: Bipin Thompson MD;  Location: [facility] OR [address] [place] [address];  Service: Urology;  Laterality: N/A;  1 hour    Squamous cell cancer removal x3 with Mohs surgery      TONSILLECTOMY      TOTAL KNEE ARTHROPLASTY  10/2012    trus/bx      ULTRASOUND GUIDANCE  2/14/2020    Procedure: Ultrasound Guidance;  Surgeon: Miguel Angel Brady MD;  Location: [facility] CATH LAB;  Service: Cardiology;;     Review of Systems      Unchanged from previous visit.  Objective:      Physical Exam  Vitals signs and nursing note reviewed.   Constitutional:       General: He is not in acute distress.     Appearance: He is well-developed. He is not diaphoretic.   HENT:      Head: Normocephalic and atraumatic.   Pulmonary:      Effort: Pulmonary effort is normal. No respiratory distress.   Musculoskeletal: Normal range of motion.         General: No tenderness.        Legs:    Skin:     General: Skin is warm and dry.      Findings: No erythema or rash.   Neurological:      Mental Status: He is alert and oriented to person, place, and time.   Psychiatric:         Behavior: Behavior normal.         Thought Content: Thought content normal.         Judgment: Judgment normal.             A vascular lab study performed on 9/18/20 revealed right leg segmental pressures and PVR waveforms revealed minimal to mild PAOD.  Left leg segmental pressures and PVR waveforms revealed mild to moderate PAODNgoc Boudreaux was seen in the clinic room and placed in the supine position on the treatment table.  The left leg was cleansed with Easi-clense sponges and dried thoroughly.  Eucerin cream was applied to the lower legs. Calmoseptine was applied to the  periwound area. A hydrofiber dressing was applied to the wound and covered with an ABD pad.  The patient's foot was positioned at a 90 degree angle.  The coflex with calamine was applied per package instructions.  A two layered application was performed using a spiral technique beginning with the foam layer followed by the cohesive bandage avoiding creases or folds.  The wrap was started behind the first metatarsal and ended below the tibial tubercle of the knee.  There was overlap of each turn half the width of the previous turn.  The compression wrap will be changed every 7 days.    Assessment:       1. Delayed surgical wound healing, initial encounter    2. Varicose veins of leg with complications    3. Venous stasis dermatitis of both lower extremities    4. Bilateral leg edema               Plan:          Benedryl 50 mg every 4 hours as needed for itching.  Triamncinolone cream to affected area on legs.  Coflex compression wrap with calamine left lower leg as detailed above.  Patient was warned not to get the dressings wet and to use cast covers for showering.  Should the dressing become wet, he is to remove it, place a wet-to-dry dressing over the wound, cover with gauze and roll gauze and use ace wraps for compression and to secure bandages.  He should then notify this office as soon as possible to have a new dressing applied.  Return to clinic in one week.

## 2020-10-14 NOTE — BRIEF OP NOTE
[facility]  Brief Operative Note    Surgery Date: 10/14/2020     Surgeon(s) and Role:     * Rad Swift MD - Primary    Assisting Surgeon: None    Pre-op Diagnosis:  Trigger finger of right thumb [M65.311]    Post-op Diagnosis:  Post-Op Diagnosis Codes:     * Trigger finger of right thumb [M65.311]    Procedure(s) (LRB):  RELEASE, TRIGGER FINGER - right (Right)    Anesthesia: Local MAC    Description of the findings of the procedure(s): Right thumb TFR    Estimated Blood Loss: * No values recorded between 10/14/2020  7:58 AM and 10/14/2020  8:11 AM *         Specimens:   Specimen (12h ago, onward)    None            Discharge Note    OUTCOME: Patient tolerated treatment/procedure well without complication and is now ready for discharge.    DISPOSITION: Home or Self Care    FINAL DIAGNOSIS:  Trigger thumb, right thumb    FOLLOWUP: In clinic    DISCHARGE INSTRUCTIONS:    Discharge Procedure Orders   Diet general     Call MD for:  temperature >100.4     Call MD for:  persistent nausea and vomiting     Call MD for:  severe uncontrolled pain     Call MD for:  difficulty breathing, headache or visual disturbances     Call MD for:  redness, tenderness, or signs of infection (pain, swelling, redness, odor or green/yellow discharge around incision site)     Call MD for:  hives     Call MD for:  persistent dizziness or light-headedness     Call MD for:  extreme fatigue     Sponge bath only until clinic visit     Keep surgical extremity elevated     Ice to affected area     Lifting restrictions   Order Comments: NWB RUE     No driving, operating heavy equipment or signing legal documents while taking pain medication.     Leave dressing on - Keep it clean, dry, and intact until clinic visit     Shower on day dressing removed (No bath)

## 2020-10-14 NOTE — PATIENT INSTRUCTIONS
Elevate legs as much as possible. Do not get the dressings wet and use cast covers for showering.  Should the dressing become wet, remove it, place a wet-to-dry dressing over the wound, cover with gauze and roll gauze and use ace wraps for compression and to secure bandages.  Notify this office as soon as possible to have a new dressing applied.    You may leave the wrap on for one week.  In one week unroll each layer.  You may then shower using a mild soap such as Dove.  Irrigate the wound with lukewarm water for 5 minutes and dry thoroughly.  Apply the foam dressing to the wound, cover with cotton gauze and secure with paper tape.  Apply compression stockings first thing in the morning and remove at bedtime. Do not sleep in the hose.  Change dressing three times weekly.  Do not get the dressing wet and use cast covers for showering.  Report any signs of infection.

## 2020-10-14 NOTE — TRANSFER OF CARE
"Anesthesia Transfer of Care Note    Patient: Janusz Montgomery Jr.    Procedure(s) Performed: Procedure(s) (LRB):  RELEASE, TRIGGER FINGER - right (Right)    Patient location: PACU    Anesthesia Type: general    Transport from OR: Transported from OR on room air with adequate spontaneous ventilation    Post pain: adequate analgesia    Post assessment: no apparent anesthetic complications    Post vital signs: stable    Level of consciousness: awake    Nausea/Vomiting: no nausea/vomiting    Complications: none    Transfer of care protocol was followed      Last vitals:   Visit Vitals  /60 (BP Location: Left arm, Patient Position: Lying)   Pulse 67   Temp 36.4 °C (97.5 °F) (Temporal)   Resp 18   Ht 6' 1" (1.854 m)   Wt (!) 154.2 kg (340 lb)   SpO2 97%   BMI 44.86 kg/m²     "

## 2020-10-14 NOTE — PROGRESS NOTES
[facility] called to request post op/discharge orders for patient.  Per Dr. Swift, page Dr. Toth.    New orders to follow.

## 2020-10-14 NOTE — TELEPHONE ENCOUNTER
Contacted Mr. Montgomery he is ep. Need to schedule for Mohs and Dr. Cooper' s repair. Good Meza and coordinated Mohs and repair for 11/30/2020 need to confirm appt with .

## 2020-10-14 NOTE — INTERVAL H&P NOTE
The patient has been examined and the H&P has been reviewed:    I concur with the findings and no changes have occurred since H&P was written.    Surgery risks, benefits and alternative options discussed and understood by patient/family.          Active Hospital Problems    Diagnosis  POA    Trigger thumb, right thumb [M65.311]  Yes      Resolved Hospital Problems   No resolved problems to display.

## 2020-10-15 ENCOUNTER — TELEPHONE (OUTPATIENT)
Dept: CARDIOLOGY | Facility: HOSPITAL | Age: 75
End: 2020-10-15

## 2020-10-15 NOTE — ANESTHESIA POSTPROCEDURE EVALUATION
Anesthesia Post Evaluation    Patient: Janusz Montgomery Jr.    Procedure(s) Performed: Procedure(s) (LRB):  RELEASE, TRIGGER FINGER - right (Right)    Final Anesthesia Type: MAC    Patient location during evaluation: PACU  Patient participation: Yes- Able to Participate  Level of consciousness: awake and alert  Post-procedure vital signs: reviewed and stable  Pain management: adequate  Airway patency: patent    PONV status at discharge: No PONV  Anesthetic complications: no      Cardiovascular status: blood pressure returned to baseline  Respiratory status: unassisted  Hydration status: euvolemic  Follow-up not needed.          Vitals Value Taken Time   /57 10/14/20 0845   Temp 36.4 °C (97.5 °F) 10/14/20 0815   Pulse 57 10/14/20 0845   Resp 11 10/14/20 0845   SpO2 100 % 10/14/20 0845         Event Time   Out of Recovery 08:42:00         Pain/Serjio Score: Pain Rating Prior to Med Admin: 0 (10/14/2020  7:19 AM)  Serjio Score: 9 (10/14/2020  8:15 AM)

## 2020-10-15 NOTE — TELEPHONE ENCOUNTER
----- Message from Luis Palacio MD sent at 10/13/2020  4:02 PM CDT -----  I have tried reaching him but get no answer. I do not think further monitoring would add anything and would recommend removal or leaving it alone.  ----- Message -----  From: Princess Patrick RN  Sent: 10/5/2020   5:59 AM CDT  To: Luis Palacio MD, Rachel Jeffery RN    Alert transmission received this morning for Low Battery status. Pt has a MDT ILR implanted for suspected AF. Pt had a 4 hr episode of AF on 1/17/18 and was started on Eliquis. Last OV was on 9/28/2020 w/ GAURAV Ashraf NP. No recent AF noted on device. Device reached RRT on 10/1/2020.

## 2020-10-19 ENCOUNTER — TELEPHONE (OUTPATIENT)
Dept: DERMATOLOGY | Facility: CLINIC | Age: 75
End: 2020-10-19

## 2020-10-19 NOTE — TELEPHONE ENCOUNTER
Contacted  Ulises he is ep referred over to Mohs for BCC l medial uppere eyelid canthus. Coordinated with Dr. Cooper's office to do repair for 11/30/2020 @ 7:50am. He confirmed his appt will mail appt reminder.

## 2020-10-20 ENCOUNTER — PATIENT MESSAGE (OUTPATIENT)
Dept: WOUND CARE | Facility: CLINIC | Age: 75
End: 2020-10-20

## 2020-10-21 ENCOUNTER — OFFICE VISIT (OUTPATIENT)
Dept: WOUND CARE | Facility: CLINIC | Age: 75
End: 2020-10-21
Payer: MEDICARE

## 2020-10-21 VITALS
DIASTOLIC BLOOD PRESSURE: 74 MMHG | HEIGHT: 73 IN | BODY MASS INDEX: 41.75 KG/M2 | HEART RATE: 83 BPM | TEMPERATURE: 98 F | SYSTOLIC BLOOD PRESSURE: 166 MMHG | WEIGHT: 315 LBS

## 2020-10-21 DIAGNOSIS — T81.89XA DELAYED SURGICAL WOUND HEALING, INITIAL ENCOUNTER: Primary | ICD-10-CM

## 2020-10-21 DIAGNOSIS — I87.2 VENOUS STASIS DERMATITIS OF BOTH LOWER EXTREMITIES: ICD-10-CM

## 2020-10-21 DIAGNOSIS — R60.0 BILATERAL LEG EDEMA: ICD-10-CM

## 2020-10-21 DIAGNOSIS — I83.899 VARICOSE VEINS OF LEG WITH COMPLICATIONS: ICD-10-CM

## 2020-10-21 PROCEDURE — 29580 STRAPPING UNNA BOOT: CPT | Mod: HCNC,LT,S$GLB, | Performed by: NURSE PRACTITIONER

## 2020-10-21 PROCEDURE — 99999 PR PBB SHADOW E&M-EST. PATIENT-LVL V: ICD-10-PCS | Mod: PBBFAC,HCNC,, | Performed by: NURSE PRACTITIONER

## 2020-10-21 PROCEDURE — 29580 PR STRAPPING UNNA BOOT: ICD-10-PCS | Mod: HCNC,LT,S$GLB, | Performed by: NURSE PRACTITIONER

## 2020-10-21 PROCEDURE — 99999 PR PBB SHADOW E&M-EST. PATIENT-LVL V: CPT | Mod: PBBFAC,HCNC,, | Performed by: NURSE PRACTITIONER

## 2020-10-21 PROCEDURE — 99499 NO LOS: ICD-10-PCS | Mod: HCNC,S$GLB,, | Performed by: NURSE PRACTITIONER

## 2020-10-21 PROCEDURE — 99499 UNLISTED E&M SERVICE: CPT | Mod: HCNC,S$GLB,, | Performed by: NURSE PRACTITIONER

## 2020-10-21 NOTE — PROGRESS NOTES
Subjective:       Patient ID: Janusz Montgomery Jr. is a 75 y.o. male.    Chief Complaint: Wound Check    Wound Check       75 y.o. male referred by Dr. Kumar for evaluation and management of an open wound to the left lower leg.  He was seen by Dr. Kumar on 6/12/20 at which time he presented for evaluation of his wound which had been present for the past year. He requested and underwent a biopsy of the leg wound on that date.  The biopsy was positive for BCC.  He used efudex for about a month.  A coflex compression wrap was placed on the leg on his last visit.  The wound is healing as evidenced by increased granulation and wound contracture. He has neuropathy and is on gabapentin.  He is afebrile. He denies increased redness, swelling or purulent drainage.  He does not complain of pain.     Patient Active Problem List   Diagnosis    Essential hypertension    Other hyperlipidemia    Coronary artery disease involving native coronary artery of native heart without angina pectoris s/p RCA stent    Obstructive sleep apnea    GERD (gastroesophageal reflux disease)    Benign prostatic hyperplasia with urinary obstruction    Morbid obesity with body mass index of 45.0-49.9 in adult    Prostate cancer    Paroxysmal atrial fibrillation     Major depressive disorder, recurrent episode, mild    Generalized anxiety disorder    History of gout    Herpes simplex keratoconjunctivitis    Decreased range of motion of left ankle    Right leg weakness    Impaired mobility    Balance problems    PVD (peripheral vascular disease)    Bilateral leg edema    Bilateral carotid artery stenosis    Gait abnormality    Left leg weakness    Delayed sleep phase syndrome    Floppy eyelid syndrome    Dyspnea    Insomnia    Restrictive lung disease    Personal history of asbestosis    CAD (coronary artery disease)    Varicose veins of leg with complications    Delayed surgical wound healing    Venous stasis  "dermatitis of both lower extremities    Acute kidney injury superimposed on chronic kidney disease    CKD (chronic kidney disease) stage 3, GFR 30-59 ml/min    Hypotension    History of loop recorder    Trigger thumb, right thumb     Past Medical History:   Diagnosis Date    NAT (acute kidney injury) 3/19/2017    ALLERGIC RHINITIS     Anemia     Anxiety     Basal cell carcinoma 10/19/2018    forehead and right medial shoulder    Basal cell carcinoma 01/09/2019    left nasal bridge and left posterior ear    Basal cell carcinoma 06/12/2020    left lower medial leg - Efudex    Chronic rhinitis 5/3/2013    Chronic rhinitis 5/3/2013    Coronary artery disease involving native coronary artery of native heart without angina pectoris s/p RCA stent     Cortical cataract of both eyes 3/18/2016    Delayed sleep phase syndrome 3/13/2019    Depression     Erectile dysfunction 3/24/2014    Erectile dysfunction 3/24/2014    Essential hypertension     GERD (gastroesophageal reflux disease) 7/25/2012    Gout, arthritis     Grade III open fracture of left tibia and fibula s/p ex-fix on 7/1/16 and ORIF of left tibia on 7/15 7/6/2016    H/O: iritis     Helicobacter pylori (H. pylori) infection     Treated    Herpes simplex keratoconjunctivitis 9/30/2015    - on acyclovir - followed by opthalmology, Dr. Uribe     Herpes simplex keratoconjunctivitis 9/30/2015    - on acyclovir - followed by opthalmology, Dr. Uribe     Hyperkalemia 2/28/2017    Hyperlipidemia     Hypogonadism male     Hypogonadism male     Mixed anxiety and depressive disorder     Morbid obesity     Obstructive sleep apnea on CPAP     CPAP    Osteoarthritis of left knee 7/25/2012    Paroxysmal atrial fibrillation 7/6/2016    Primary osteoarthritis of left knee 7/25/2012    Prominent aorta 1/25/2016    "RESULTS: THE HEART IS MILDLY ENLARGED WITH A SLIGHTLY PROMINENT AORTA" - Xray Chest PA & Lateral 12-     Prostate cancer " "2/15/2016    - followed by urology, Dr. Young     Prostate cancer 2/15/2016    - followed by urology, Dr. 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 Conteh    CATARACT EXTRACTION W/  INTRAOCULAR LENS IMPLANT Left 4/12/2016        COLONOSCOPY N/A 7/23/2020    Procedure: COLONOSCOPY;  Surgeon: Nico Lackey MD;  Location: [facility] ([address] [place] [address]);  Service: Endoscopy;  Laterality: N/A;    ESOPHAGOGASTRODUODENOSCOPY N/A 7/23/2020    Procedure: EGD (ESOPHAGOGASTRODUODENOSCOPY);  Surgeon: Nico Lackey MD;  Location: [facility] ([address] [place] [address]);  Service: Endoscopy;  Laterality: N/A;  per Dr Lackey-Will proceed with EGD and colonoscopy on the 2nd floor due to his comorbidities including obesity, sleep apnea, restrictive lung disease, coronary artery disease.  has loop recorder      ok to hold Eliquis 2 days per Dr Sandhu-must remain    EXTERNAL FIXATION TIBIAL FRACTURE Left 07/01/2016    INSERTION OF IMPLANTABLE LOOP RECORDER  04/07/2017    LEFT HEART CATHETERIZATION Left 1/30/2020    Procedure: Left heart cath;  Surgeon: Benjamin Sandhu MD;  Location: [facility] CATH LAB;  Service: Cardiology;  Laterality: Left;  RN PREOP 1/28/20--Pt starting Plavix loading dose today (8pills)- Dr. Sandhu aware.  Pt has a bandaged "non healing area to LLE"--Dr. Sandhu aware.    LEFT HEART CATHETERIZATION Left 2/14/2020    Procedure: Left heart cath, IVUS guided " left main / LAD PCI. Noon start, radial approach;  Surgeon: Miguel Angel Brady MD;  Location: [facility] CATH LAB;  Service: Cardiology;  Laterality: Left;  RN PRE OP 2-7-2020  BMI--45.61    ORIF TIBIA FRACTURE Left 07/15/2016    RADIOACTIVE SEED IMPLANTATION OF PROSTATE N/A 8/8/2018    Procedure: INSERTION, RADIOACTIVE SEED, PROSTATE;  Surgeon: Bipin Thompson MD;  Location: [address];  Service: Urology;  Laterality: N/A;  1 hour    Squamous cell cancer removal x3 with Mohs surgery      TONSILLECTOMY      TOTAL KNEE ARTHROPLASTY  10/2012    TRIGGER FINGER RELEASE Right 10/14/2020    Procedure: RELEASE, TRIGGER FINGER - right;  Surgeon: Rad Swift MD;  Location: [facility];  Service: Orthopedics;  Laterality: Right;    trus/bx      ULTRASOUND GUIDANCE  2/14/2020    Procedure: Ultrasound Guidance;  Surgeon: Miguel Angel Brady MD;  Location: [facility] CATH LAB;  Service: Cardiology;;     Review of Systems      Unchanged from previous visit.  Objective:      Physical Exam  Vitals signs and nursing note reviewed.   Constitutional:       General: He is not in acute distress.     Appearance: He is well-developed. He is not diaphoretic.   HENT:      Head: Normocephalic and atraumatic.   Pulmonary:      Effort: Pulmonary effort is normal. No respiratory distress.   Musculoskeletal: Normal range of motion.         General: No tenderness.        Legs:    Skin:     General: Skin is warm and dry.      Findings: No erythema or rash.   Neurological:      Mental Status: He is alert and oriented to person, place, and time.   Psychiatric:         Behavior: Behavior normal.         Thought Content: Thought content normal.         Judgment: Judgment normal.         LLE             A vascular lab study performed on 9/18/20 revealed right leg segmental pressures and PVR waveforms revealed minimal to mild PAOD.  Left leg segmental pressures and PVR waveforms revealed mild to moderate PAOD.    Janusz was seen in the clinic room and placed in  the supine position on the treatment table.  The left leg was cleansed with Easi-clense sponges and dried thoroughly.  Eucerin cream was applied to the lower legs. Calmoseptine was applied to the periwound area. The patient's foot was positioned at a 90 degree angle.  The coflex with calamine was applied per package instructions.  A two layered application was performed using a spiral technique beginning with the foam layer followed by the cohesive bandage avoiding creases or folds.  The wrap was started behind the first metatarsal and ended below the tibial tubercle of the knee.  There was overlap of each turn half the width of the previous turn.  The compression wrap will be changed every 7 days.    Assessment:       1. Delayed surgical wound healing, initial encounter    2. Venous stasis dermatitis of both lower extremities    3. Varicose veins of leg with complications    4. Bilateral leg edema               Plan:          Triamncinolone cream to affected area on legs.  Coflex compression wrap with calamine left lower leg as detailed above.  Patient was warned not to get the dressings wet and to use cast covers for showering.  Should the dressing become wet, he is to remove it, place a wet-to-dry dressing over the wound, cover with gauze and roll gauze and use ace wraps for compression and to secure bandages.  He should then notify this office as soon as possible to have a new dressing applied.  Return to clinic in one week.

## 2020-10-22 ENCOUNTER — LAB VISIT (OUTPATIENT)
Dept: LAB | Facility: HOSPITAL | Age: 75
End: 2020-10-22
Attending: INTERNAL MEDICINE
Payer: MEDICARE

## 2020-10-22 ENCOUNTER — OFFICE VISIT (OUTPATIENT)
Dept: PULMONOLOGY | Facility: CLINIC | Age: 75
End: 2020-10-22
Payer: MEDICARE

## 2020-10-22 ENCOUNTER — TELEPHONE (OUTPATIENT)
Dept: PULMONOLOGY | Facility: CLINIC | Age: 75
End: 2020-10-22

## 2020-10-22 VITALS
HEIGHT: 73 IN | BODY MASS INDEX: 41.75 KG/M2 | HEART RATE: 73 BPM | DIASTOLIC BLOOD PRESSURE: 65 MMHG | WEIGHT: 315 LBS | OXYGEN SATURATION: 96 % | SYSTOLIC BLOOD PRESSURE: 161 MMHG

## 2020-10-22 DIAGNOSIS — R05.9 COUGH: ICD-10-CM

## 2020-10-22 DIAGNOSIS — G47.33 OSA (OBSTRUCTIVE SLEEP APNEA): ICD-10-CM

## 2020-10-22 DIAGNOSIS — D64.9 ANEMIA, UNSPECIFIED TYPE: ICD-10-CM

## 2020-10-22 DIAGNOSIS — G47.33 OBSTRUCTIVE SLEEP APNEA: ICD-10-CM

## 2020-10-22 DIAGNOSIS — R06.09 DOE (DYSPNEA ON EXERTION): Primary | ICD-10-CM

## 2020-10-22 DIAGNOSIS — N17.9 AKI (ACUTE KIDNEY INJURY): ICD-10-CM

## 2020-10-22 LAB
ANION GAP SERPL CALC-SCNC: 9 MMOL/L (ref 8–16)
BUN SERPL-MCNC: 19 MG/DL (ref 8–23)
CALCIUM SERPL-MCNC: 9.2 MG/DL (ref 8.7–10.5)
CHLORIDE SERPL-SCNC: 109 MMOL/L (ref 95–110)
CO2 SERPL-SCNC: 25 MMOL/L (ref 23–29)
CREAT SERPL-MCNC: 1.3 MG/DL (ref 0.5–1.4)
EST. GFR  (AFRICAN AMERICAN): >60 ML/MIN/1.73 M^2
EST. GFR  (NON AFRICAN AMERICAN): 54.1 ML/MIN/1.73 M^2
GLUCOSE SERPL-MCNC: 112 MG/DL (ref 70–110)
POTASSIUM SERPL-SCNC: 5.1 MMOL/L (ref 3.5–5.1)
SODIUM SERPL-SCNC: 143 MMOL/L (ref 136–145)

## 2020-10-22 PROCEDURE — 3078F PR MOST RECENT DIASTOLIC BLOOD PRESSURE < 80 MM HG: ICD-10-PCS | Mod: HCNC,CPTII,S$GLB, | Performed by: NURSE PRACTITIONER

## 2020-10-22 PROCEDURE — 1159F MED LIST DOCD IN RCRD: CPT | Mod: HCNC,S$GLB,, | Performed by: NURSE PRACTITIONER

## 2020-10-22 PROCEDURE — 3077F SYST BP >= 140 MM HG: CPT | Mod: HCNC,CPTII,S$GLB, | Performed by: NURSE PRACTITIONER

## 2020-10-22 PROCEDURE — 1101F PT FALLS ASSESS-DOCD LE1/YR: CPT | Mod: HCNC,CPTII,S$GLB, | Performed by: NURSE PRACTITIONER

## 2020-10-22 PROCEDURE — 3008F BODY MASS INDEX DOCD: CPT | Mod: HCNC,CPTII,S$GLB, | Performed by: NURSE PRACTITIONER

## 2020-10-22 PROCEDURE — 3008F PR BODY MASS INDEX (BMI) DOCUMENTED: ICD-10-PCS | Mod: HCNC,CPTII,S$GLB, | Performed by: NURSE PRACTITIONER

## 2020-10-22 PROCEDURE — 3078F DIAST BP <80 MM HG: CPT | Mod: HCNC,CPTII,S$GLB, | Performed by: NURSE PRACTITIONER

## 2020-10-22 PROCEDURE — 36415 COLL VENOUS BLD VENIPUNCTURE: CPT | Mod: HCNC,PO

## 2020-10-22 PROCEDURE — 99499 UNLISTED E&M SERVICE: CPT | Mod: S$GLB,,, | Performed by: NURSE PRACTITIONER

## 2020-10-22 PROCEDURE — 1126F PR PAIN SEVERITY QUANTIFIED, NO PAIN PRESENT: ICD-10-PCS | Mod: HCNC,S$GLB,, | Performed by: NURSE PRACTITIONER

## 2020-10-22 PROCEDURE — 1159F PR MEDICATION LIST DOCUMENTED IN MEDICAL RECORD: ICD-10-PCS | Mod: HCNC,S$GLB,, | Performed by: NURSE PRACTITIONER

## 2020-10-22 PROCEDURE — 99999 PR PBB SHADOW E&M-EST. PATIENT-LVL V: ICD-10-PCS | Mod: PBBFAC,HCNC,, | Performed by: NURSE PRACTITIONER

## 2020-10-22 PROCEDURE — 80048 BASIC METABOLIC PNL TOTAL CA: CPT | Mod: HCNC

## 2020-10-22 PROCEDURE — 99214 PR OFFICE/OUTPT VISIT, EST, LEVL IV, 30-39 MIN: ICD-10-PCS | Mod: HCNC,S$GLB,, | Performed by: NURSE PRACTITIONER

## 2020-10-22 PROCEDURE — 1126F AMNT PAIN NOTED NONE PRSNT: CPT | Mod: HCNC,S$GLB,, | Performed by: NURSE PRACTITIONER

## 2020-10-22 PROCEDURE — 1101F PR PT FALLS ASSESS DOC 0-1 FALLS W/OUT INJ PAST YR: ICD-10-PCS | Mod: HCNC,CPTII,S$GLB, | Performed by: NURSE PRACTITIONER

## 2020-10-22 PROCEDURE — 3077F PR MOST RECENT SYSTOLIC BLOOD PRESSURE >= 140 MM HG: ICD-10-PCS | Mod: HCNC,CPTII,S$GLB, | Performed by: NURSE PRACTITIONER

## 2020-10-22 PROCEDURE — 99499 RISK ADDL DX/OHS AUDIT: ICD-10-PCS | Mod: S$GLB,,, | Performed by: NURSE PRACTITIONER

## 2020-10-22 PROCEDURE — 99214 OFFICE O/P EST MOD 30 MIN: CPT | Mod: HCNC,S$GLB,, | Performed by: NURSE PRACTITIONER

## 2020-10-22 PROCEDURE — 99999 PR PBB SHADOW E&M-EST. PATIENT-LVL V: CPT | Mod: PBBFAC,HCNC,, | Performed by: NURSE PRACTITIONER
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
## 2020-10-23 ENCOUNTER — TELEPHONE (OUTPATIENT)
Dept: ELECTROPHYSIOLOGY | Facility: CLINIC | Age: 75
End: 2020-10-23

## 2020-10-23 ENCOUNTER — PES CALL (OUTPATIENT)
Dept: ADMINISTRATIVE | Facility: CLINIC | Age: 75
End: 2020-10-23

## 2020-10-23 ENCOUNTER — LAB VISIT (OUTPATIENT)
Dept: FAMILY MEDICINE | Facility: CLINIC | Age: 75
End: 2020-10-23
Payer: MEDICARE

## 2020-10-23 DIAGNOSIS — R05.9 COUGH: ICD-10-CM

## 2020-10-23 DIAGNOSIS — G47.33 OSA (OBSTRUCTIVE SLEEP APNEA): ICD-10-CM

## 2020-10-23 PROCEDURE — U0003 INFECTIOUS AGENT DETECTION BY NUCLEIC ACID (DNA OR RNA); SEVERE ACUTE RESPIRATORY SYNDROME CORONAVIRUS 2 (SARS-COV-2) (CORONAVIRUS DISEASE [COVID-19]), AMPLIFIED PROBE TECHNIQUE, MAKING USE OF HIGH THROUGHPUT TECHNOLOGIES AS DESCRIBED BY CMS-2020-01-R: HCPCS | Mod: HCNC

## 2020-10-24 LAB — SARS-COV-2 RNA RESP QL NAA+PROBE: NOT DETECTED

## 2020-10-26 ENCOUNTER — HOSPITAL ENCOUNTER (OUTPATIENT)
Dept: RADIOLOGY | Facility: HOSPITAL | Age: 75
Discharge: HOME | End: 2020-10-26
Attending: NURSE PRACTITIONER
Payer: MEDICARE

## 2020-10-26 ENCOUNTER — HOSPITAL ENCOUNTER (OUTPATIENT)
Dept: RESPIRATORY THERAPY | Facility: HOSPITAL | Age: 75
Discharge: HOME | End: 2020-10-26
Attending: INTERNAL MEDICINE
Payer: MEDICARE

## 2020-10-26 VITALS — RESPIRATION RATE: 18 BRPM | HEART RATE: 71 BPM | OXYGEN SATURATION: 96 %

## 2020-10-26 DIAGNOSIS — R06.09 DOE (DYSPNEA ON EXERTION): ICD-10-CM

## 2020-10-26 PROCEDURE — 71250 CT CHEST WITHOUT CONTRAST: ICD-10-PCS | Mod: 26,HCNC,, | Performed by: RADIOLOGY

## 2020-10-26 PROCEDURE — 94727 GAS DIL/WSHOT DETER LNG VOL: CPT

## 2020-10-26 PROCEDURE — 94729 PR C02/MEMBANE DIFFUSE CAPACITY: ICD-10-PCS | Mod: 26,HCNC,, | Performed by: INTERNAL MEDICINE

## 2020-10-26 PROCEDURE — 94729 DIFFUSING CAPACITY: CPT | Mod: 26,HCNC,, | Performed by: INTERNAL MEDICINE

## 2020-10-26 PROCEDURE — 94060 PR EVAL OF BRONCHOSPASM: ICD-10-PCS | Mod: 26,HCNC,, | Performed by: INTERNAL MEDICINE

## 2020-10-26 PROCEDURE — 94060 EVALUATION OF WHEEZING: CPT | Mod: 26,HCNC,, | Performed by: INTERNAL MEDICINE

## 2020-10-26 PROCEDURE — 94729 DIFFUSING CAPACITY: CPT

## 2020-10-26 PROCEDURE — 25000242 PHARM REV CODE 250 ALT 637 W/ HCPCS: Mod: HCNC | Performed by: INTERNAL MEDICINE

## 2020-10-26 PROCEDURE — 94010 BREATHING CAPACITY TEST: CPT

## 2020-10-26 PROCEDURE — 94727 PR PULM FUNCTION TEST BY GAS: ICD-10-PCS | Mod: 26,HCNC,, | Performed by: INTERNAL MEDICINE

## 2020-10-26 PROCEDURE — 94727 GAS DIL/WSHOT DETER LNG VOL: CPT | Mod: 26,HCNC,, | Performed by: INTERNAL MEDICINE

## 2020-10-26 PROCEDURE — 71250 CT THORAX DX C-: CPT | Mod: 26,HCNC,, | Performed by: RADIOLOGY

## 2020-10-26 PROCEDURE — 71250 CT THORAX DX C-: CPT | Mod: TC,HCNC

## 2020-10-26 RX ORDER — ALBUTEROL SULFATE 2.5 MG/.5ML
2.5 SOLUTION RESPIRATORY (INHALATION) ONCE
Status: COMPLETED | OUTPATIENT
Start: 2020-10-26 | End: 2020-10-26

## 2020-10-26 RX ADMIN — ALBUTEROL SULFATE 2.5 MG: 2.5 SOLUTION RESPIRATORY (INHALATION) at 02:10

## 2020-10-27 ENCOUNTER — TELEPHONE (OUTPATIENT)
Dept: ORTHOPEDICS | Facility: CLINIC | Age: 75
End: 2020-10-27

## 2020-10-28 ENCOUNTER — TELEPHONE (OUTPATIENT)
Dept: ORTHOPEDICS | Facility: CLINIC | Age: 75
End: 2020-10-28

## 2020-10-28 LAB
BRPFT: NORMAL
DLCO ADJ PRE: 20.79 ML/(MIN*MMHG)
DLCO SINGLE BREATH LLN: 22.05
DLCO SINGLE BREATH PRE REF: 71.7 %
DLCO SINGLE BREATH REF: 28.98
DLCOC SBVA LLN: 2.7
DLCOC SBVA PRE REF: 127 %
DLCOC SBVA REF: 3.76
DLCOC SINGLE BREATH LLN: 22.05
DLCOC SINGLE BREATH PRE REF: 71.7 %
DLCOC SINGLE BREATH REF: 28.98
DLCOVA LLN: 2.7
DLCOVA PRE REF: 127 %
DLCOVA PRE: 4.78 ML/(MIN*MMHG*L)
DLCOVA REF: 3.76
DLVAADJ PRE: 4.78 ML/(MIN*MMHG*L)
ERVN2 LLN: -16448.9
ERVN2 PRE REF: 51.1 %
ERVN2 PRE: 0.56 L
ERVN2 REF: 1.1
FEF 25 75 CHG: 12 %
FEF 25 75 LLN: 1.01
FEF 25 75 POST REF: 53.4 %
FEF 25 75 PRE REF: 47.6 %
FEF 25 75 REF: 2.43
FET100 CHG: -12.1 %
FEV1 CHG: -9.3 %
FEV1 FVC CHG: 5.3 %
FEV1 FVC LLN: 61
FEV1 FVC POST REF: 97.7 %
FEV1 FVC PRE REF: 92.7 %
FEV1 FVC REF: 75
FEV1 LLN: 2.39
FEV1 POST REF: 58.1 %
FEV1 PRE REF: 64 %
FEV1 REF: 3.37
FRCN2 LLN: 2.91
FRCN2 PRE REF: 44.4 %
FRCN2 REF: 3.9
FVC CHG: -13.9 %
FVC LLN: 3.32
FVC POST REF: 59 %
FVC PRE REF: 68.6 %
FVC REF: 4.52
IVC PRE: 2.86 L
IVC SINGLE BREATH LLN: 3.32
IVC SINGLE BREATH PRE REF: 63.2 %
IVC SINGLE BREATH REF: 4.52
PEF CHG: 26.8 %
PEF LLN: 6.19
PEF POST REF: 79.4 %
PEF PRE REF: 62.7 %
PEF REF: 8.7
POST FEF 25 75: 1.3 L/S
POST FET 100: 11.11 SEC
POST FEV1 FVC: 73.22 %
POST FEV1: 1.96 L
POST FVC: 2.67 L
POST PEF: 6.91 L/S
PRE DLCO: 20.79 ML/(MIN*MMHG)
PRE FEF 25 75: 1.16 L/S
PRE FET 100: 12.64 SEC
PRE FEV1 FVC: 69.53 %
PRE FEV1: 2.16 L
PRE FRC N2: 1.73 L
PRE FVC: 3.1 L
PRE PEF: 5.45 L/S
RVN2 LLN: 2.13
RVN2 PRE REF: 41.8 %
RVN2 PRE: 1.17 L
RVN2 REF: 2.8
RVN2TLCN2 LLN: 33.45
RVN2TLCN2 PRE REF: 67.6 %
RVN2TLCN2 PRE: 28.68 %
RVN2TLCN2 REF: 42.43
TLCN2 LLN: 6.55
TLCN2 PRE REF: 53 %
TLCN2 PRE: 4.08 L
TLCN2 REF: 7.7
VA PRE: 4.36 L
VA SINGLE BREATH LLN: 7.55
VA SINGLE BREATH PRE REF: 57.8 %
VA SINGLE BREATH REF: 7.55
VCMAXN2 LLN: 3.32
VCMAXN2 PRE REF: 64.3 %
VCMAXN2 PRE: 2.91 L
VCMAXN2 REF: 4.52

## 2020-10-29 ENCOUNTER — OFFICE VISIT (OUTPATIENT)
Dept: VASCULAR SURGERY | Facility: CLINIC | Age: 75
End: 2020-10-29
Attending: SURGERY
Payer: MEDICARE

## 2020-10-29 ENCOUNTER — OFFICE VISIT (OUTPATIENT)
Dept: ORTHOPEDICS | Facility: CLINIC | Age: 75
End: 2020-10-29
Payer: MEDICARE

## 2020-10-29 ENCOUNTER — TELEPHONE (OUTPATIENT)
Dept: ORTHOPEDICS | Facility: CLINIC | Age: 75
End: 2020-10-29

## 2020-10-29 ENCOUNTER — DOCUMENTATION ONLY (OUTPATIENT)
Dept: VASCULAR SURGERY | Facility: CLINIC | Age: 75
End: 2020-10-29

## 2020-10-29 VITALS
BODY MASS INDEX: 41.75 KG/M2 | SYSTOLIC BLOOD PRESSURE: 155 MMHG | TEMPERATURE: 97 F | DIASTOLIC BLOOD PRESSURE: 75 MMHG | WEIGHT: 315 LBS | HEART RATE: 86 BPM | HEIGHT: 73 IN

## 2020-10-29 VITALS
BODY MASS INDEX: 41.75 KG/M2 | HEART RATE: 90 BPM | HEIGHT: 73 IN | WEIGHT: 315 LBS | SYSTOLIC BLOOD PRESSURE: 141 MMHG | DIASTOLIC BLOOD PRESSURE: 74 MMHG

## 2020-10-29 DIAGNOSIS — L97.929 VENOUS ULCER OF LEFT LEG: Primary | ICD-10-CM

## 2020-10-29 DIAGNOSIS — M65.311 TRIGGER FINGER OF RIGHT THUMB: Primary | ICD-10-CM

## 2020-10-29 DIAGNOSIS — I83.029 VENOUS ULCER OF LEFT LEG: Primary | ICD-10-CM

## 2020-10-29 PROCEDURE — 99024 POSTOP FOLLOW-UP VISIT: CPT | Mod: HCNC,S$GLB,, | Performed by: PHYSICIAN ASSISTANT

## 2020-10-29 PROCEDURE — 29580 PR STRAPPING UNNA BOOT: ICD-10-PCS | Mod: HCNC,LT,S$GLB, | Performed by: SURGERY

## 2020-10-29 PROCEDURE — 99999 PR PBB SHADOW E&M-EST. PATIENT-LVL IV: CPT | Mod: PBBFAC,HCNC,, | Performed by: PHYSICIAN ASSISTANT

## 2020-10-29 PROCEDURE — 1126F PR PAIN SEVERITY QUANTIFIED, NO PAIN PRESENT: ICD-10-PCS | Mod: HCNC,S$GLB,, | Performed by: SURGERY

## 2020-10-29 PROCEDURE — 1157F ADVNC CARE PLAN IN RCRD: CPT | Mod: HCNC,S$GLB,, | Performed by: SURGERY

## 2020-10-29 PROCEDURE — 99999 PR PBB SHADOW E&M-EST. PATIENT-LVL IV: ICD-10-PCS | Mod: PBBFAC,HCNC,, | Performed by: PHYSICIAN ASSISTANT

## 2020-10-29 PROCEDURE — 3008F PR BODY MASS INDEX (BMI) DOCUMENTED: ICD-10-PCS | Mod: HCNC,CPTII,S$GLB, | Performed by: SURGERY

## 2020-10-29 PROCEDURE — 1101F PR PT FALLS ASSESS DOC 0-1 FALLS W/OUT INJ PAST YR: ICD-10-PCS | Mod: HCNC,CPTII,S$GLB, | Performed by: SURGERY

## 2020-10-29 PROCEDURE — 3078F PR MOST RECENT DIASTOLIC BLOOD PRESSURE < 80 MM HG: ICD-10-PCS | Mod: HCNC,CPTII,S$GLB, | Performed by: SURGERY

## 2020-10-29 PROCEDURE — 3077F PR MOST RECENT SYSTOLIC BLOOD PRESSURE >= 140 MM HG: ICD-10-PCS | Mod: HCNC,CPTII,S$GLB, | Performed by: SURGERY

## 2020-10-29 PROCEDURE — 29580 STRAPPING UNNA BOOT: CPT | Mod: HCNC,LT,S$GLB, | Performed by: SURGERY

## 2020-10-29 PROCEDURE — 3077F SYST BP >= 140 MM HG: CPT | Mod: HCNC,CPTII,S$GLB, | Performed by: SURGERY

## 2020-10-29 PROCEDURE — 99999 PR PBB SHADOW E&M-EST. PATIENT-LVL IV: ICD-10-PCS | Mod: PBBFAC,HCNC,, | Performed by: SURGERY

## 2020-10-29 PROCEDURE — 3078F DIAST BP <80 MM HG: CPT | Mod: HCNC,CPTII,S$GLB, | Performed by: SURGERY

## 2020-10-29 PROCEDURE — 1101F PT FALLS ASSESS-DOCD LE1/YR: CPT | Mod: HCNC,CPTII,S$GLB, | Performed by: SURGERY

## 2020-10-29 PROCEDURE — 1159F MED LIST DOCD IN RCRD: CPT | Mod: HCNC,S$GLB,, | Performed by: SURGERY

## 2020-10-29 PROCEDURE — 3008F BODY MASS INDEX DOCD: CPT | Mod: HCNC,CPTII,S$GLB, | Performed by: SURGERY

## 2020-10-29 PROCEDURE — 1159F PR MEDICATION LIST DOCUMENTED IN MEDICAL RECORD: ICD-10-PCS | Mod: HCNC,S$GLB,, | Performed by: SURGERY

## 2020-10-29 PROCEDURE — 1157F PR ADVANCE CARE PLAN OR EQUIV PRESENT IN MEDICAL RECORD: ICD-10-PCS | Mod: HCNC,S$GLB,, | Performed by: SURGERY

## 2020-10-29 PROCEDURE — 3288F FALL RISK ASSESSMENT DOCD: CPT | Mod: HCNC,CPTII,S$GLB, | Performed by: SURGERY

## 2020-10-29 PROCEDURE — 1126F AMNT PAIN NOTED NONE PRSNT: CPT | Mod: HCNC,S$GLB,, | Performed by: SURGERY

## 2020-10-29 PROCEDURE — 99024 PR POST-OP FOLLOW-UP VISIT: ICD-10-PCS | Mod: HCNC,S$GLB,, | Performed by: PHYSICIAN ASSISTANT

## 2020-10-29 PROCEDURE — 3288F PR FALLS RISK ASSESSMENT DOCUMENTED: ICD-10-PCS | Mod: HCNC,CPTII,S$GLB, | Performed by: SURGERY

## 2020-10-29 PROCEDURE — 99999 PR PBB SHADOW E&M-EST. PATIENT-LVL IV: CPT | Mod: PBBFAC,HCNC,, | Performed by: SURGERY

## 2020-10-29 NOTE — PROGRESS NOTES
Patient was seen in clinic today and placed in supine position on treatment chair.  Compression wrap removed and left leg cleansed with Easi-cleanse sponge and dried thoroughly.  Left medial wound measured 1 x 0.5 cm. Small superficial open area with no signs of infection, no erythema, no drainage noted and adriana-wound skin intact.  Eucerin cream applied to left lower leg.  Calmoseptine applied to adriana-wound area, foot positioned at a 90 degree angle.  Coflex wrap with calamine applied per package instructions - two layer application performed using a spiral technique beginning with foam layer followed by co-hensive bandage avoiding creases or folds.  Compression wrap started behind first metatarsal and ended below tibial tubercle of knee with overlap of each turn half the width of the previous turn.  Patient instructed to elevate legs as much as possible, do not get dressing wet, and use cast cover/protector for showering.  Should dressing become wet, patient instructed to unroll it, place wet to dry dressing over wound covered with gauze, roll gauze and use two ace wraps for compression to secure bandages.  Notify this office as soon as possible to have new dressing applied.  Patient has scheduled appointment for 11/6/2020 with Jonna Mancilla for follow up.

## 2020-10-29 NOTE — PROGRESS NOTES
"Mr. Montgomery is here today for a post-operative visit.  He is 15 days status post A1 pulley release of right thumb by Dr. Swift on 10/14/2020. He reports that he is doing well.  Pain is mild.  He is not taking pain medication.  He denies fever, chills, and sweats since the time of the surgery.     Physical exam:    Vitals:    10/29/20 1430   BP: (!) 141/74   Pulse: 90   Weight: (!) 153 kg (337 lb 4.9 oz)   Height: 6' 1" (1.854 m)   PainSc: 0-No pain     Vital signs are stable, patient is afebrile.  Patient is well dressed and well groomed, no acute distress.  Alert and oriented to person, place, and time.  Post op dressing taken down.  Incision is clean, dry, and intact.  There is no erythema or exudate.  There is no sign of any infection. He is NVI. Sutures removed without difficulty.      Assessment: 15 days status post A1 pulley release of right thumb    Plan:  Janusz was seen today for post-op evaluation.    Diagnoses and all orders for this visit:    Trigger finger of right thumb        - PO instruction reviewed and provided to patient  - Discussed scar massage  - Follow up in 4 weeks if needed  - Call with questions or concerns          "

## 2020-10-29 NOTE — PATIENT INSTRUCTIONS
POST OPERATIVE VISIT INSTRUCTIONS    - no soaking the incision for at least 7-10 days, you may get it wet in the shower and use regular soap and water 1-2 days after suture removal    To avoid a hard, painful scar, we recommend you use Mederma and/or Silicone Scar patches. You may also use Janet Butter or Vitamin E oil.    You may start this 1 week after stitches are removed.   - Mederma scar cream : scar massage over incision 1-2 times per day. May use topical lotion or Vitamin E oil for massage an additional few times a day to help the scar become soft and less sensitive  - Silicone Scar Patches: cut and place over the incision, then massage over the patch  to help with scarring and sensitivity. Can be reused up to 4 days if you rinse it clean, let it dry out, then reapply    -Brands:  Mepiform Silicone Scar Sheets (Recommended), Scar Away,    Target Brand or Generic Pharmacy Brand ([facility], [facility], [facility])      - Continue Range of motion exercises as instructed     - May take Tylenol 500 mg and/or Ibuprofen 400mg every 4-6 hours with food for pain as tolerated as long as you do not have an allergy or medical condition that prevents you from taking them    - Lifting Restrictions: gradually increase    - Follow up: as needed    -contact us immediately at [phone] if you have any concerns about infection. Please let them know that you are a post operative patient.

## 2020-10-29 NOTE — TELEPHONE ENCOUNTER
Offered the patient an appointment this afternoon. Patient states that he has appointments this afternoon, but he can come for 2pm. Informed the patient that I would call him again this afternoon as we do not have electricity at this time. Patient verbalized understanding.

## 2020-10-30 NOTE — PROGRESS NOTES
Please call the patient  and let them know their test results are back. Please call the patient to schedule a follow up appointment to discuss results. If Dr. Loredo is available, please schedule him for a routine follow up otherwise I  Would like to see him back soon. Thanks-Clara

## 2020-11-02 ENCOUNTER — TELEPHONE (OUTPATIENT)
Dept: PULMONOLOGY | Facility: CLINIC | Age: 75
End: 2020-11-02

## 2020-11-02 ENCOUNTER — PATIENT MESSAGE (OUTPATIENT)
Dept: PULMONOLOGY | Facility: CLINIC | Age: 75
End: 2020-11-02

## 2020-11-02 ENCOUNTER — NURSE TRIAGE (OUTPATIENT)
Dept: ADMINISTRATIVE | Facility: CLINIC | Age: 75
End: 2020-11-02

## 2020-11-02 ENCOUNTER — HOSPITAL ENCOUNTER (EMERGENCY)
Facility: HOSPITAL | Age: 75
Discharge: HOME | End: 2020-11-02
Attending: EMERGENCY MEDICINE
Payer: MEDICARE

## 2020-11-02 VITALS
BODY MASS INDEX: 41.75 KG/M2 | RESPIRATION RATE: 18 BRPM | SYSTOLIC BLOOD PRESSURE: 132 MMHG | HEART RATE: 74 BPM | HEIGHT: 73 IN | TEMPERATURE: 99 F | WEIGHT: 315 LBS | OXYGEN SATURATION: 99 % | DIASTOLIC BLOOD PRESSURE: 74 MMHG

## 2020-11-02 DIAGNOSIS — K13.79 ORAL BLEEDING: Primary | ICD-10-CM

## 2020-11-02 PROCEDURE — 25000003 PHARM REV CODE 250: Mod: HCNC | Performed by: EMERGENCY MEDICINE

## 2020-11-02 PROCEDURE — 99283 EMERGENCY DEPT VISIT LOW MDM: CPT | Mod: HCNC

## 2020-11-02 RX ADMIN — Medication 1 EACH: at 10:11

## 2020-11-02 NOTE — TELEPHONE ENCOUNTER
Three or more attempt has been made to schedule patient clinic follow up, but was unsuccessful. Message to provider. LVM, Letter sent to patient via portal and address on file Please advise thanks.

## 2020-11-02 NOTE — TELEPHONE ENCOUNTER
----- Message from Clara Howard NP sent at 10/30/2020  8:48 AM CDT -----  Please call the patient  and let them know their test results are back. Please call the patient to schedule a follow up appointment to discuss results. If Dr. Loredo is available, please schedule him for a routine follow up otherwise I  Would like to see him back soon. Thanks-Clara

## 2020-11-02 NOTE — LETTER
November 2, 2020    Janusz Montgomery Jr.  [address]  [place] [address]             [facility] - Pulmonology  [address]  [facility] [address]  Phone: [phone]  Fax: [phone] Dear Janusz Garcia,    Mr. Montgomery your provider KAVITHA Milan has request to to speak with you to discuss your results and treatment options. We have made several attempts to reach you via phone but was unsuccessful. Please give us a call at [phone] to discuss. Thank you for choosing [facility]. We look forward to speaking with you soon.    Sincerely,    Your [facility]

## 2020-11-03 ENCOUNTER — TELEPHONE (OUTPATIENT)
Dept: CARDIOLOGY | Facility: HOSPITAL | Age: 75
End: 2020-11-03

## 2020-11-03 NOTE — ED NOTES
Pt c/o bleeding gums after having 4 teeth worked on today at the dentist. Pt is A & O x 3, denies SOB,fever, chills and N/V/D. Skin is warm, dry and pink. AIDA x 3mm. BBS- CTA. Abd- SNT. PSM x 4 exts. Will continue to monitor closely.

## 2020-11-03 NOTE — TELEPHONE ENCOUNTER
Spoke with Cecelia Montgomery (wife):    Patient had dental work done today and c/o persistant bleeding.  Patient was instructed to put tea bags in his mouth and apply pressure. Dental procedure was completed > 3 hours ago and the patient is still bleeding. He reports taking Plavix and Eliquis daily. Per protocol, advised the patient to go to the ER now at [facility].     Reason for Disposition   [1] Bleeding from mouth AND [2] won't stop after 10 minutes of direct pressure    Additional Information   Negative: Severe difficulty breathing (e.g., struggling for each breath, speaks in single words, stridor)   Negative: Sounds like a life-threatening emergency to the triager   Negative: Injury to tooth or teeth   Negative: Injury to mouth   Negative: Canker sore suspected (i.e., aphthous ulcer) in the mouth   Negative: Cold sore suspected (i.e., fever blister sore) on the outer lip   Negative: Tooth is painful or swelling around a tooth   Negative: Mouth is painful   Negative: Throat is painful   Negative: Tongue swelling   Negative: Lip swelling   Negative: [1] Drooling or spitting out saliva (because can't swallow) AND [2] new onset    Protocols used: MOUTH SYMPTOMS-A-AH

## 2020-11-03 NOTE — ED PROVIDER NOTES
Encounter Date: 11/2/2020    SCRIBE #1 NOTE: I, Bri Head, am scribing for, and in the presence of,  Walter Hansen MD. I have scribed the following portions of the note - Other sections scribed: HPI, ROS, PE.       History     Chief Complaint   Patient presents with    Bleeding gums     Pt c/o bleeding from the mouth, around 1700, after having 4 teeth filled earlier today. Pt reports the dentist used a device while performing the procedure and he believes the gums were injured during the process. Pt currently takes ASA and eliquis daily.     CC: Dental problem    75-year-old male presents to the ED complaining of bleeding from his gums since 5 pm this evening. Patient states he had 4 cavities filled today. Patient believes he has a cut from the procedure. He reports attempts to stop the bleeding with paper towel without success. He takes Plavix and Eliquis daily. No other associated symptoms.     The history is provided by the patient.     Review of patient's allergies indicates:   Allergen Reactions    Ciprofloxacin Rash     Diffuse pruritic morbilliform rash developed 3/15/2017 after dose of cipro; previously in 2/2017 he had rash/fevers after initiation of cipro    Zosyn [piperacillin-tazobactam] Rash     Diffuse pruritic morbilliform rash developed 3/15/2017.  Then, 430am dose on 3/16 and rash worsened with SOB/tachypnea but no hypoxemia.     Bacitracin Itching and Rash     Violaceous rash in area of topical Tx.      Past Medical History:   Diagnosis Date    NAT (acute kidney injury) 3/19/2017    ALLERGIC RHINITIS     Anemia     Anxiety     Basal cell carcinoma 10/19/2018    forehead and right medial shoulder    Basal cell carcinoma 01/09/2019    left nasal bridge and left posterior ear    Basal cell carcinoma 06/12/2020    left lower medial leg - Efudex    Chronic rhinitis 5/3/2013    Chronic rhinitis 5/3/2013    Coronary artery disease involving native coronary artery of native heart  "without angina pectoris s/p RCA stent     Cortical cataract of both eyes 3/18/2016    Delayed sleep phase syndrome 3/13/2019    Depression     Erectile dysfunction 3/24/2014    Erectile dysfunction 3/24/2014    Essential hypertension     GERD (gastroesophageal reflux disease) 7/25/2012    Gout, arthritis     Grade III open fracture of left tibia and fibula s/p ex-fix on 7/1/16 and ORIF of left tibia on 7/15 7/6/2016    H/O: iritis     Helicobacter pylori (H. pylori) infection     Treated    Herpes simplex keratoconjunctivitis 9/30/2015    - on acyclovir - followed by opthalmology, Dr. Uribe     Herpes simplex keratoconjunctivitis 9/30/2015    - on acyclovir - followed by opthalmology, Dr. Uribe     Hyperkalemia 2/28/2017    Hyperlipidemia     Hypogonadism male     Hypogonadism male     Mixed anxiety and depressive disorder     Morbid obesity     Obstructive sleep apnea on CPAP     CPAP    Osteoarthritis of left knee 7/25/2012    Paroxysmal atrial fibrillation 7/6/2016    Primary osteoarthritis of left knee 7/25/2012    Prominent aorta 1/25/2016    "RESULTS: THE HEART IS MILDLY ENLARGED WITH A SLIGHTLY PROMINENT AORTA" - Xray Chest PA & Lateral 12-     Prostate cancer 2/15/2016    - followed by urology, Dr. Young     Prostate cancer 2/15/2016    - followed by urology, Dr. 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    chest and forehead    Squamous cell cancer of skin of nose     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 7/6/2016    Type III open fracture of left tibia and fibula with routine healing 7/6/2016    Vitamin D deficiency disease     Vitreous detachment 3/18/2016     Past Surgical History:   Procedure Laterality Date    Cardiac stenting x2      CATARACT EXTRACTION W/  INTRAOCULAR LENS IMPLANT Right 3/29/2016    Dr." "Wero    CATARACT EXTRACTION W/  INTRAOCULAR LENS IMPLANT Left 4/12/2016        COLONOSCOPY N/A 7/23/2020    Procedure: COLONOSCOPY;  Surgeon: Nico Lackey MD;  Location: [facility] (2ND FLR);  Service: Endoscopy;  Laterality: N/A;    ESOPHAGOGASTRODUODENOSCOPY N/A 7/23/2020    Procedure: EGD (ESOPHAGOGASTRODUODENOSCOPY);  Surgeon: Nico Lackey MD;  Location: [facility] (2ND FLR);  Service: Endoscopy;  Laterality: N/A;  per Dr Lackey-Will proceed with EGD and colonoscopy on the 2nd floor due to his comorbidities including obesity, sleep apnea, restrictive lung disease, coronary artery disease.  has loop recorder      ok to hold Eliquis 2 days per Dr Sandhu-must remain    EXTERNAL FIXATION TIBIAL FRACTURE Left 07/01/2016    INSERTION OF IMPLANTABLE LOOP RECORDER  04/07/2017    LEFT HEART CATHETERIZATION Left 1/30/2020    Procedure: Left heart cath;  Surgeon: Benjamin Sandhu MD;  Location: [facility] CATH LAB;  Service: Cardiology;  Laterality: Left;  RN PREOP 1/28/20--Pt starting Plavix loading dose today (8pills)- Dr. Sandhu aware.  Pt has a bandaged "non healing area to LLE"--Dr. Sandhu aware.    LEFT HEART CATHETERIZATION Left 2/14/2020    Procedure: Left heart cath, IVUS guided left main / LAD PCI. Noon start, radial approach;  Surgeon: Miguel Angel Brady MD;  Location: [facility] CATH LAB;  Service: Cardiology;  Laterality: Left;  RN PRE OP 2-7-2020  BMI--45.61    ORIF TIBIA FRACTURE Left 07/15/2016    RADIOACTIVE SEED IMPLANTATION OF PROSTATE N/A 8/8/2018    Procedure: INSERTION, RADIOACTIVE SEED, PROSTATE;  Surgeon: Bipin Thompson MD;  Location: [facility] 1ST FLR;  Service: Urology;  Laterality: N/A;  1 hour    Squamous cell cancer removal x3 with Mohs surgery      TONSILLECTOMY      TOTAL KNEE ARTHROPLASTY  10/2012    TRIGGER FINGER RELEASE Right 10/14/2020    Procedure: RELEASE, TRIGGER FINGER - right;  Surgeon: Rad Swift MD;  Location: [facility];  Service: Orthopedics;  " Laterality: Right;    trus/bx      ULTRASOUND GUIDANCE  2/14/2020    Procedure: Ultrasound Guidance;  Surgeon: Miguel Angel Brady MD;  Location: [facility] CATH LAB;  Service: Cardiology;;     Family History   Problem Relation Age of Onset    Skin cancer Father     Lung cancer Father     Cancer Father         smoker,     Alzheimer's disease Mother     Hypertension Mother     Cancer Sister         colon, lung cancer     No Known Problems Sister     Cancer Brother         skin cancer, polypectomy     Peripheral vascular disease Unknown     No Known Problems Maternal Aunt     No Known Problems Maternal Uncle     No Known Problems Paternal Aunt     No Known Problems Paternal Uncle     No Known Problems Maternal Grandmother     No Known Problems Maternal Grandfather     No Known Problems Paternal Grandmother     No Known Problems Paternal Grandfather     Melanoma Neg Hx     Psoriasis Neg Hx     Lupus Neg Hx     Eczema Neg Hx     Amblyopia Neg Hx     Blindness Neg Hx     Cataracts Neg Hx     Diabetes Neg Hx     Glaucoma Neg Hx     Macular degeneration Neg Hx     Retinal detachment Neg Hx     Strabismus Neg Hx     Stroke Neg Hx     Thyroid disease Neg Hx     Acne Neg Hx      Social History     Tobacco Use    Smoking status: Never Smoker    Smokeless tobacco: Never Used   Substance Use Topics    Alcohol use: Yes     Frequency: 2-4 times a month     Drinks per session: 3 or 4     Binge frequency: Never     Comment: occasionally, beer    Drug use: Never     Review of Systems   Constitutional: Negative for fever.   HENT: Positive for dental problem. Negative for sore throat.    Eyes: Negative for visual disturbance.   Respiratory: Negative for shortness of breath.    Cardiovascular: Negative for chest pain.   Gastrointestinal: Negative for abdominal pain.   Genitourinary: Negative for dysuria.   Musculoskeletal: Negative for back pain.   Skin: Negative for rash.   Neurological: Negative for  headaches.       Physical Exam     Initial Vitals [11/02/20 2102]   BP Pulse Resp Temp SpO2   (!) 140/64 84 18 98.6 °F (37 °C) 98 %      MAP       --         Physical Exam    Nursing note and vitals reviewed.  Constitutional: He appears well-developed and well-nourished.   HENT:   Head: Normocephalic and atraumatic.   Bleeding from back side in between tooth number 9 and 10.   Eyes: EOM are normal. Pupils are equal, round, and reactive to light.   Neck: Normal range of motion.   Cardiovascular: Normal rate and regular rhythm.   Pulmonary/Chest: No respiratory distress.   Musculoskeletal: Normal range of motion.   Neurological: He is alert and oriented to person, place, and time.   Skin: Skin is warm and dry.         ED Course   Procedures  Labs Reviewed - No data to display       Imaging Results    None          Medical Decision Making:   History:   Old Medical Records: I decided to obtain old medical records.    Pressure was held with you bag without bleeding controlled.  Hemecon oral dressing applied which did control bleeding.  Unfortunately after attempting to place a oral surgical dressing over the Hemecon.  The dressing did not adhere due to no significant ability to dry the area.  Attempts to and here the dressing hold off the Hemecon.  No active bleeding and patient is stable for discharge.          Scribe Attestation:   Scribe #1: I performed the above scribed service and the documentation accurately describes the services I performed. I attest to the accuracy of the note.                      Clinical Impression:     ICD-10-CM ICD-9-CM   1. Oral bleeding  K13.79 528.9                          ED Disposition Condition    Discharge Stable        ED Prescriptions     None        Follow-up Information     Follow up With Specialties Details Why Contact Info        call your dentist in the morning.                            I, Walter Fulton, personally performed the services described in this documentation.  All medical record entries made by the scribe were at my direction and in my presence.  I have reviewed the chart and agree that the record reflects my personal performance and is accurate and complete.             Walter Hansen MD  11/05/20 [id number]

## 2020-11-04 ENCOUNTER — CLINICAL SUPPORT (OUTPATIENT)
Dept: CARDIOLOGY | Facility: HOSPITAL | Age: 75
End: 2020-11-04
Payer: MEDICARE

## 2020-11-04 DIAGNOSIS — Z95.818 PRESENCE OF OTHER CARDIAC IMPLANTS AND GRAFTS: ICD-10-CM

## 2020-11-04 PROCEDURE — 93298 REM INTERROG DEV EVAL SCRMS: CPT | Mod: HCNC,,, | Performed by: INTERNAL MEDICINE

## 2020-11-04 PROCEDURE — 93298 CARDIAC DEVICE CHECK - REMOTE: ICD-10-PCS | Mod: HCNC,,, | Performed by: INTERNAL MEDICINE

## 2020-11-04 PROCEDURE — G2066 INTER DEVC REMOTE 30D: HCPCS | Mod: HCNC | Performed by: INTERNAL MEDICINE

## 2020-11-05 ENCOUNTER — OFFICE VISIT (OUTPATIENT)
Dept: PULMONOLOGY | Facility: CLINIC | Age: 75
End: 2020-11-05
Payer: MEDICARE

## 2020-11-05 VITALS
SYSTOLIC BLOOD PRESSURE: 146 MMHG | OXYGEN SATURATION: 95 % | WEIGHT: 315 LBS | HEART RATE: 90 BPM | BODY MASS INDEX: 41.75 KG/M2 | HEIGHT: 73 IN | DIASTOLIC BLOOD PRESSURE: 76 MMHG

## 2020-11-05 DIAGNOSIS — J61 ASBESTOSIS: Primary | ICD-10-CM

## 2020-11-05 DIAGNOSIS — G47.33 OSA ON CPAP: ICD-10-CM

## 2020-11-05 DIAGNOSIS — J98.4 SMALL AIRWAYS DISEASE: ICD-10-CM

## 2020-11-05 DIAGNOSIS — D53.9 MACROCYTIC ANEMIA: ICD-10-CM

## 2020-11-05 PROCEDURE — 3008F PR BODY MASS INDEX (BMI) DOCUMENTED: ICD-10-PCS | Mod: HCNC,CPTII,S$GLB, | Performed by: NURSE PRACTITIONER

## 2020-11-05 PROCEDURE — 3078F PR MOST RECENT DIASTOLIC BLOOD PRESSURE < 80 MM HG: ICD-10-PCS | Mod: HCNC,CPTII,S$GLB, | Performed by: NURSE PRACTITIONER

## 2020-11-05 PROCEDURE — 99214 OFFICE O/P EST MOD 30 MIN: CPT | Mod: HCNC,S$GLB,, | Performed by: NURSE PRACTITIONER

## 2020-11-05 PROCEDURE — 99499 RISK ADDL DX/OHS AUDIT: ICD-10-PCS | Mod: S$GLB,,, | Performed by: NURSE PRACTITIONER

## 2020-11-05 PROCEDURE — 99999 PR PBB SHADOW E&M-EST. PATIENT-LVL IV: ICD-10-PCS | Mod: PBBFAC,HCNC,, | Performed by: NURSE PRACTITIONER

## 2020-11-05 PROCEDURE — 99499 UNLISTED E&M SERVICE: CPT | Mod: S$GLB,,, | Performed by: NURSE PRACTITIONER

## 2020-11-05 PROCEDURE — 1159F MED LIST DOCD IN RCRD: CPT | Mod: HCNC,S$GLB,, | Performed by: NURSE PRACTITIONER

## 2020-11-05 PROCEDURE — 3077F PR MOST RECENT SYSTOLIC BLOOD PRESSURE >= 140 MM HG: ICD-10-PCS | Mod: HCNC,CPTII,S$GLB, | Performed by: NURSE PRACTITIONER

## 2020-11-05 PROCEDURE — 1126F PR PAIN SEVERITY QUANTIFIED, NO PAIN PRESENT: ICD-10-PCS | Mod: HCNC,S$GLB,, | Performed by: NURSE PRACTITIONER

## 2020-11-05 PROCEDURE — 3008F BODY MASS INDEX DOCD: CPT | Mod: HCNC,CPTII,S$GLB, | Performed by: NURSE PRACTITIONER

## 2020-11-05 PROCEDURE — 99214 PR OFFICE/OUTPT VISIT, EST, LEVL IV, 30-39 MIN: ICD-10-PCS | Mod: HCNC,S$GLB,, | Performed by: NURSE PRACTITIONER

## 2020-11-05 PROCEDURE — 99999 PR PBB SHADOW E&M-EST. PATIENT-LVL IV: CPT | Mod: PBBFAC,HCNC,, | Performed by: NURSE PRACTITIONER

## 2020-11-05 PROCEDURE — 3078F DIAST BP <80 MM HG: CPT | Mod: HCNC,CPTII,S$GLB, | Performed by: NURSE PRACTITIONER

## 2020-11-05 PROCEDURE — 1101F PT FALLS ASSESS-DOCD LE1/YR: CPT | Mod: HCNC,CPTII,S$GLB, | Performed by: NURSE PRACTITIONER

## 2020-11-05 PROCEDURE — 1101F PR PT FALLS ASSESS DOC 0-1 FALLS W/OUT INJ PAST YR: ICD-10-PCS | Mod: HCNC,CPTII,S$GLB, | Performed by: NURSE PRACTITIONER

## 2020-11-05 PROCEDURE — 3077F SYST BP >= 140 MM HG: CPT | Mod: HCNC,CPTII,S$GLB, | Performed by: NURSE PRACTITIONER

## 2020-11-05 PROCEDURE — 1159F PR MEDICATION LIST DOCUMENTED IN MEDICAL RECORD: ICD-10-PCS | Mod: HCNC,S$GLB,, | Performed by: NURSE PRACTITIONER

## 2020-11-05 PROCEDURE — 1126F AMNT PAIN NOTED NONE PRSNT: CPT | Mod: HCNC,S$GLB,, | Performed by: NURSE PRACTITIONER

## 2020-11-05 RX ORDER — FLUTICASONE FUROATE AND VILANTEROL 100; 25 UG/1; UG/1
1 POWDER RESPIRATORY (INHALATION) DAILY
Qty: 60 EACH | Refills: 3 | Status: SHIPPED | OUTPATIENT
Start: 2020-11-05 | End: 2021-06-17 | Stop reason: SDUPTHER

## 2020-11-05 RX ORDER — CLOTRIMAZOLE AND BETAMETHASONE DIPROPIONATE 10; .64 MG/G; MG/G
CREAM TOPICAL 2 TIMES DAILY
Qty: 1 TUBE | Refills: 1 | Status: CANCELLED | OUTPATIENT
Start: 2020-11-05
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
## 2020-11-06 ENCOUNTER — OFFICE VISIT (OUTPATIENT)
Dept: WOUND CARE | Facility: CLINIC | Age: 75
End: 2020-11-06
Payer: MEDICARE

## 2020-11-06 VITALS
HEART RATE: 76 BPM | WEIGHT: 315 LBS | TEMPERATURE: 98 F | DIASTOLIC BLOOD PRESSURE: 64 MMHG | HEIGHT: 73 IN | SYSTOLIC BLOOD PRESSURE: 131 MMHG | BODY MASS INDEX: 41.75 KG/M2

## 2020-11-06 DIAGNOSIS — I87.2 VENOUS STASIS DERMATITIS OF BOTH LOWER EXTREMITIES: Primary | ICD-10-CM

## 2020-11-06 PROBLEM — T81.89XA DELAYED SURGICAL WOUND HEALING: Status: RESOLVED | Noted: 2020-08-21 | Resolved: 2020-11-06

## 2020-11-06 PROBLEM — G47.21 DELAYED SLEEP PHASE SYNDROME: Status: RESOLVED | Noted: 2019-03-13 | Resolved: 2020-11-06

## 2020-11-06 PROCEDURE — 99999 PR PBB SHADOW E&M-EST. PATIENT-LVL V: ICD-10-PCS | Mod: PBBFAC,HCNC,, | Performed by: NURSE PRACTITIONER

## 2020-11-06 PROCEDURE — 1101F PR PT FALLS ASSESS DOC 0-1 FALLS W/OUT INJ PAST YR: ICD-10-PCS | Mod: HCNC,CPTII,S$GLB, | Performed by: NURSE PRACTITIONER

## 2020-11-06 PROCEDURE — 1101F PT FALLS ASSESS-DOCD LE1/YR: CPT | Mod: HCNC,CPTII,S$GLB, | Performed by: NURSE PRACTITIONER

## 2020-11-06 PROCEDURE — 3075F PR MOST RECENT SYSTOLIC BLOOD PRESS GE 130-139MM HG: ICD-10-PCS | Mod: HCNC,CPTII,S$GLB, | Performed by: NURSE PRACTITIONER

## 2020-11-06 PROCEDURE — 3008F BODY MASS INDEX DOCD: CPT | Mod: HCNC,CPTII,S$GLB, | Performed by: NURSE PRACTITIONER

## 2020-11-06 PROCEDURE — 99212 OFFICE O/P EST SF 10 MIN: CPT | Mod: HCNC,S$GLB,, | Performed by: NURSE PRACTITIONER

## 2020-11-06 PROCEDURE — 1159F MED LIST DOCD IN RCRD: CPT | Mod: HCNC,S$GLB,, | Performed by: NURSE PRACTITIONER

## 2020-11-06 PROCEDURE — 99212 PR OFFICE/OUTPT VISIT, EST, LEVL II, 10-19 MIN: ICD-10-PCS | Mod: HCNC,S$GLB,, | Performed by: NURSE PRACTITIONER

## 2020-11-06 PROCEDURE — 1159F PR MEDICATION LIST DOCUMENTED IN MEDICAL RECORD: ICD-10-PCS | Mod: HCNC,S$GLB,, | Performed by: NURSE PRACTITIONER

## 2020-11-06 PROCEDURE — 3078F PR MOST RECENT DIASTOLIC BLOOD PRESSURE < 80 MM HG: ICD-10-PCS | Mod: HCNC,CPTII,S$GLB, | Performed by: NURSE PRACTITIONER

## 2020-11-06 PROCEDURE — 3008F PR BODY MASS INDEX (BMI) DOCUMENTED: ICD-10-PCS | Mod: HCNC,CPTII,S$GLB, | Performed by: NURSE PRACTITIONER

## 2020-11-06 PROCEDURE — 99999 PR PBB SHADOW E&M-EST. PATIENT-LVL V: CPT | Mod: PBBFAC,HCNC,, | Performed by: NURSE PRACTITIONER

## 2020-11-06 PROCEDURE — 3078F DIAST BP <80 MM HG: CPT | Mod: HCNC,CPTII,S$GLB, | Performed by: NURSE PRACTITIONER

## 2020-11-06 PROCEDURE — 1126F PR PAIN SEVERITY QUANTIFIED, NO PAIN PRESENT: ICD-10-PCS | Mod: HCNC,S$GLB,, | Performed by: NURSE PRACTITIONER

## 2020-11-06 PROCEDURE — 3075F SYST BP GE 130 - 139MM HG: CPT | Mod: HCNC,CPTII,S$GLB, | Performed by: NURSE PRACTITIONER

## 2020-11-06 PROCEDURE — 1126F AMNT PAIN NOTED NONE PRSNT: CPT | Mod: HCNC,S$GLB,, | Performed by: NURSE PRACTITIONER

## 2020-11-06 NOTE — PROGRESS NOTES
Subjective:       Patient ID: Janusz Montgomery Jr. is a 75 y.o. male.    Chief Complaint: Wound Check    Wound Check       75 y.o. male referred by Dr. Kumar for evaluation and management of an open wound to the left lower leg.  He was seen by Dr. Kumar on 6/12/20 at which time he presented for evaluation of his wound which had been present for the past year. He requested and underwent a biopsy of the leg wound on that date.  The biopsy was positive for BCC.  He used efudex for about a month.  A coflex compression wrap was placed on the leg on his last visit and the wound is now healed. He is afebrile. He denies increased redness, swelling or purulent drainage.  He does not complain of pain.     Patient Active Problem List   Diagnosis    Essential hypertension    Other hyperlipidemia    Coronary artery disease involving native coronary artery of native heart without angina pectoris s/p RCA stent    Obstructive sleep apnea    GERD (gastroesophageal reflux disease)    Benign prostatic hyperplasia with urinary obstruction    Morbid obesity with body mass index of 45.0-49.9 in adult    Prostate cancer    Paroxysmal atrial fibrillation     Major depressive disorder, recurrent episode, mild    Generalized anxiety disorder    History of gout    Herpes simplex keratoconjunctivitis    Decreased range of motion of left ankle    Right leg weakness    Impaired mobility    Balance problems    PVD (peripheral vascular disease)    Bilateral leg edema    Bilateral carotid artery stenosis    Gait abnormality    Left leg weakness    Delayed sleep phase syndrome    Floppy eyelid syndrome    PURDY (dyspnea on exertion)    Insomnia    Restrictive lung disease    Personal history of asbestosis    CAD (coronary artery disease)    Varicose veins of leg with complications    Delayed surgical wound healing    Venous stasis dermatitis of both lower extremities    Acute kidney injury superimposed on  "chronic kidney disease    CKD (chronic kidney disease) stage 3, GFR 30-59 ml/min    Hypotension    History of loop recorder    Trigger thumb, right thumb    Asbestosis     Past Medical History:   Diagnosis Date    NAT (acute kidney injury) 3/19/2017    ALLERGIC RHINITIS     Anemia     Anxiety     Basal cell carcinoma 10/19/2018    forehead and right medial shoulder    Basal cell carcinoma 01/09/2019    left nasal bridge and left posterior ear    Basal cell carcinoma 06/12/2020    left lower medial leg - Efudex    Chronic rhinitis 5/3/2013    Chronic rhinitis 5/3/2013    Coronary artery disease involving native coronary artery of native heart without angina pectoris s/p RCA stent     Cortical cataract of both eyes 3/18/2016    Delayed sleep phase syndrome 3/13/2019    Depression     Erectile dysfunction 3/24/2014    Erectile dysfunction 3/24/2014    Essential hypertension     GERD (gastroesophageal reflux disease) 7/25/2012    Gout, arthritis     Grade III open fracture of left tibia and fibula s/p ex-fix on 7/1/16 and ORIF of left tibia on 7/15 7/6/2016    H/O: iritis     Helicobacter pylori (H. pylori) infection     Treated    Herpes simplex keratoconjunctivitis 9/30/2015    - on acyclovir - followed by opthalmology, Dr. Uribe     Herpes simplex keratoconjunctivitis 9/30/2015    - on acyclovir - followed by opthalmology, Dr. Uribe     Hyperkalemia 2/28/2017    Hyperlipidemia     Hypogonadism male     Hypogonadism male     Mixed anxiety and depressive disorder     Morbid obesity     Obstructive sleep apnea on CPAP     CPAP    Osteoarthritis of left knee 7/25/2012    Paroxysmal atrial fibrillation 7/6/2016    Primary osteoarthritis of left knee 7/25/2012    Prominent aorta 1/25/2016    "RESULTS: THE HEART IS MILDLY ENLARGED WITH A SLIGHTLY PROMINENT AORTA" - Xray Chest PA & Lateral 12-     Prostate cancer 2/15/2016    - followed by urology, Dr. Young     Prostate " "cancer 2/15/2016    - followed by urology, Dr. 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 Conteh    CATARACT EXTRACTION W/  INTRAOCULAR LENS IMPLANT Left 4/12/2016        COLONOSCOPY N/A 7/23/2020    Procedure: COLONOSCOPY;  Surgeon: Nico Lackey MD;  Location: [facility] ([address] [place] [address]);  Service: Endoscopy;  Laterality: N/A;    ESOPHAGOGASTRODUODENOSCOPY N/A 7/23/2020    Procedure: EGD (ESOPHAGOGASTRODUODENOSCOPY);  Surgeon: Nico Lackey MD;  Location: [facility] ([address] [place] [address]);  Service: Endoscopy;  Laterality: N/A;  per Dr Lackey-Will proceed with EGD and colonoscopy on the 2nd floor due to his comorbidities including obesity, sleep apnea, restrictive lung disease, coronary artery disease.  has loop recorder      ok to hold Eliquis 2 days per Dr Sandhu-must remain    EXTERNAL FIXATION TIBIAL FRACTURE Left 07/01/2016    INSERTION OF IMPLANTABLE LOOP RECORDER  04/07/2017    LEFT HEART CATHETERIZATION Left 1/30/2020    Procedure: Left heart cath;  Surgeon: Benjamin Sandhu MD;  Location: [facility] CATH LAB;  Service: Cardiology;  Laterality: Left;  RN PREOP 1/28/20--Pt starting Plavix loading dose today (8pills)- Dr. Sandhu aware.  Pt has a bandaged "non healing area to LLE"--Dr. Sandhu aware.    LEFT HEART CATHETERIZATION Left 2/14/2020    Procedure: Left heart cath, IVUS guided left main / LAD PCI. Noon start, radial approach;  Surgeon: " Miguel Angel Brady MD;  Location: [facility] CATH LAB;  Service: Cardiology;  Laterality: Left;  RN PRE OP 2-7-2020  BMI--45.61    ORIF TIBIA FRACTURE Left 07/15/2016    RADIOACTIVE SEED IMPLANTATION OF PROSTATE N/A 8/8/2018    Procedure: INSERTION, RADIOACTIVE SEED, PROSTATE;  Surgeon: Bipin Thompson MD;  Location: [address];  Service: Urology;  Laterality: N/A;  1 hour    Squamous cell cancer removal x3 with Mohs surgery      TONSILLECTOMY      TOTAL KNEE ARTHROPLASTY  10/2012    TRIGGER FINGER RELEASE Right 10/14/2020    Procedure: RELEASE, TRIGGER FINGER - right;  Surgeon: Rad Swift MD;  Location: [facility] OR;  Service: Orthopedics;  Laterality: Right;    trus/bx      ULTRASOUND GUIDANCE  2/14/2020    Procedure: Ultrasound Guidance;  Surgeon: Miguel Angel Brady MD;  Location: [facility] CATH LAB;  Service: Cardiology;;     Review of Systems      Unchanged from previous visit.  Objective:      Physical Exam  Vitals signs and nursing note reviewed.   Constitutional:       General: He is not in acute distress.     Appearance: He is well-developed. He is not diaphoretic.   HENT:      Head: Normocephalic and atraumatic.   Pulmonary:      Effort: Pulmonary effort is normal. No respiratory distress.   Musculoskeletal: Normal range of motion.         General: No tenderness.        Legs:    Skin:     General: Skin is warm and dry.      Findings: No erythema or rash.   Neurological:      Mental Status: He is alert and oriented to person, place, and time.   Psychiatric:         Behavior: Behavior normal.         Thought Content: Thought content normal.         Judgment: Judgment normal.         LLE         Assessment:       1. Venous stasis dermatitis of both lower extremities               Plan:          Triamcinolone cream to affected area on legs twice daily.  Return to this clinic as needed,

## 2020-11-09 ENCOUNTER — OFFICE VISIT (OUTPATIENT)
Dept: DERMATOLOGY | Facility: CLINIC | Age: 75
End: 2020-11-09
Payer: MEDICARE

## 2020-11-09 DIAGNOSIS — L30.2 AUTOECZEMATIZATION: ICD-10-CM

## 2020-11-09 DIAGNOSIS — I87.2 VENOUS STASIS DERMATITIS, UNSPECIFIED LATERALITY: Primary | ICD-10-CM

## 2020-11-09 PROBLEM — D53.9 MACROCYTIC ANEMIA: Status: ACTIVE | Noted: 2020-11-09

## 2020-11-09 PROCEDURE — 1101F PR PT FALLS ASSESS DOC 0-1 FALLS W/OUT INJ PAST YR: ICD-10-PCS | Mod: HCNC,CPTII,S$GLB, | Performed by: DERMATOLOGY

## 2020-11-09 PROCEDURE — 1159F PR MEDICATION LIST DOCUMENTED IN MEDICAL RECORD: ICD-10-PCS | Mod: HCNC,S$GLB,, | Performed by: DERMATOLOGY

## 2020-11-09 PROCEDURE — 1159F MED LIST DOCD IN RCRD: CPT | Mod: HCNC,S$GLB,, | Performed by: DERMATOLOGY

## 2020-11-09 PROCEDURE — 99999 PR PBB SHADOW E&M-EST. PATIENT-LVL III: ICD-10-PCS | Mod: PBBFAC,HCNC,,

## 2020-11-09 PROCEDURE — 1101F PT FALLS ASSESS-DOCD LE1/YR: CPT | Mod: HCNC,CPTII,S$GLB, | Performed by: DERMATOLOGY

## 2020-11-09 PROCEDURE — 99999 PR PBB SHADOW E&M-EST. PATIENT-LVL III: CPT | Mod: PBBFAC,HCNC,,

## 2020-11-09 PROCEDURE — 99213 PR OFFICE/OUTPT VISIT, EST, LEVL III, 20-29 MIN: ICD-10-PCS | Mod: HCNC,S$GLB,, | Performed by: DERMATOLOGY

## 2020-11-09 PROCEDURE — 99213 OFFICE O/P EST LOW 20 MIN: CPT | Mod: HCNC,S$GLB,, | Performed by: DERMATOLOGY

## 2020-11-09 NOTE — PROGRESS NOTES
Subjective:       Patient ID:  Janusz Montgomery Jr. is a 75 y.o. male who presents for   Chief Complaint   Patient presents with    Rash     HPI  74yo man presents to clinic for rash to LLE x 2-3 days and rash to back x 1 week.     Rash to legs started after eating salty seafood. Notes swelling to both LEs and yellow crust/fluid to left lower extremity yesterday that prompted visit today. Applied TAC 0.1% cream to the area last night and notes some improvement. Rash to right and left upper back started last week, says it is itchy. Has been applying TAC 0.1% cream to it nightly which he says has helped a little.    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 He had been following with wound care for LLE ulcer which is now healed. Currently using compression stockings daily.        Review of Systems   Constitutional: Negative for fever, chills and fatigue.   Respiratory: Positive for shortness of breath.    Skin: Positive for rash.        Objective:    Physical Exam   Constitutional: He appears well-developed and well-nourished.   Neurological: He is alert and oriented to person, place, and time.   Skin:   Areas Examined (abnormalities noted in diagram):   Head / Face Inspection Performed  Neck Inspection Performed  Abdomen Inspection Performed  Back Inspection Performed  RLE Inspected  LLE Inspection Performed              Diagram Legend     Erythematous scaling macule/papule c/w actinic keratosis       Vascular papule c/w angioma      Pigmented verrucoid papule/plaque c/w seborrheic keratosis      Yellow umbilicated papule c/w sebaceous hyperplasia      Irregularly shaped tan macule c/w lentigo     1-2 mm smooth white papules consistent with Milia      Movable subcutaneous cyst with punctum c/w epidermal inclusion cyst      Subcutaneous movable cyst c/w pilar cyst      Firm pink to brown papule c/w dermatofibroma       Pedunculated fleshy papule(s) c/w skin tag(s)      Evenly pigmented macule c/w junctional nevus     Mildly variegated pigmented, slightly irregular-bordered macule c/w mildly atypical nevus      Flesh colored to evenly pigmented papule c/w intradermal nevus       Pink pearly papule/plaque c/w basal cell carcinoma      Erythematous hyperkeratotic cursted plaque c/w SCC      Surgical scar with no sign of skin cancer recurrence      Open and closed comedones      Inflammatory papules and pustules      Verrucoid papule consistent consistent with wart     Erythematous eczematous patches and plaques     Dystrophic onycholytic nail with subungual debris c/w onychomycosis     Umbilicated papule    Erythematous-base heme-crusted tan verrucoid plaque consistent with inflamed seborrheic keratosis     Erythematous Silvery Scaling Plaque c/w Psoriasis     See annotation      Assessment / Plan:        Venous stasis dermatitis  Exacerbation due to salt intake this weekend.  -  Compression stockings during day  -  Elevation whenever possible  -  Avoid salty foods  -  TAC 0.1% ointment BID x 2 weeks (paient has at home)    Autoeczematization, back  Symmetric, guttate, eczematous patches due to venous stasis.  -  Counseled   -  TAC 0.1% cream BID to AA on back    RTC PRN           No follow-ups on file.

## 2020-11-09 NOTE — PATIENT INSTRUCTIONS
Triamcinolone skin cream, ointment, lotion, or aerosol  What is this medicine?  TRIAMCINOLONE (trye am SIN oh lone) is a corticosteroid. It is used on the skin to reduce swelling, redness, itching, and allergic reactions.  How should I use this medicine?  This medicine is for external use only. Do not take by mouth. Follow the directions on the prescription label. Wash your hands before and after use. Apply a thin film of medicine to the affected area. Do not cover with a bandage or dressing unless your doctor or health care professional tells you to. Do not use on healthy skin or over large areas of skin. Do not get this medicine in your eyes. If you do, rinse out with plenty of cool tap water. It is important not to use more medicine than prescribed. Do not use your medicine more often than directed.  Talk to your pediatrician regarding the use of this medicine in children. Special care may be needed.  Elderly patients are more likely to have damaged skin through aging, and this may increase side effects. This medicine should only be used for brief periods and infrequently in older patients.  What side effects may I notice from receiving this medicine?  Side effects that you should report to your doctor or health care professional as soon as possible:  · burning or itching of the skin  · dark red spots on the skin  · infection  · painful, red, pus filled blisters in hair follicles  · thinning of the skin, sunburn more likely especially on the face  Side effects that usually do not require medical attention (report to your doctor or health care professional if they continue or are bothersome):  · dry skin, irritation  · unusual increased growth of hair on the face or body  What may interact with this medicine?  Interactions are not expected.  What if I miss a dose?  If you miss a dose, use it as soon as you can. If it is almost time for your next dose, use only that dose. Do not use double or extra doses.  Where  should I keep my medicine?  Keep out of the reach of children.  Store at room temperature between 15 and 30 degrees C (59 and 86 degrees F). Do not freeze. Throw away any unused medicine after the expiration date.  What should I tell my health care provider before I take this medicine?  They need to know if you have any of these conditions:  · diabetes  · infection, like tuberculosis, herpes, or fungal infection  · large areas of burned or damaged skin  · skin wasting or thinning  · an unusual or allergic reaction to triamcinolone, corticosteroids, other medicines, foods, dyes, or preservatives  · pregnant or trying to get pregnant  · breast-feeding  What should I watch for while using this medicine?  Tell your doctor or health care professional if your symptoms do not start to get better within one week. Do not use for more than 14 days. Do not use on healthy skin or over large areas of skin. Tell your doctor or health care professional if you are exposed to anyone with measles or chickenpox, or if you develop sores or blisters that do not heal properly.  Do not use an airtight bandage to cover the affected area unless your doctor or health care professional tells you to. If you are to cover the area, follow the instructions carefully. Covering the area where the medicine is applied can increase the amount that passes through the skin and increases the risk of side effects.  If treating the diaper area of a child, avoid covering the treated area with tight-fitting diapers or plastic pants. This may increase the amount of medicine that passes through the skin and increase the risk of serious side effects.  NOTE:This sheet is a summary. It may not cover all possible information. If you have questions about this medicine, talk to your doctor, pharmacist, or health care provider. Copyright© 2017 [employer]

## 2020-11-11 ENCOUNTER — TELEPHONE (OUTPATIENT)
Dept: OPHTHALMOLOGY | Facility: CLINIC | Age: 75
End: 2020-11-11

## 2020-11-12 ENCOUNTER — OFFICE VISIT (OUTPATIENT)
Dept: OPHTHALMOLOGY | Facility: CLINIC | Age: 75
End: 2020-11-12
Payer: MEDICARE

## 2020-11-12 DIAGNOSIS — C44.111 BASAL CELL CARCINOMA (BCC) OF MEDIAL CANTHUS OF LEFT EYE: Primary | ICD-10-CM

## 2020-11-12 DIAGNOSIS — B00.52 HERPES SIMPLEX VIRUS STROMAL KERATITIS: ICD-10-CM

## 2020-11-12 DIAGNOSIS — H02.59 FLOPPY EYELID SYNDROME: ICD-10-CM

## 2020-11-12 PROCEDURE — 1157F PR ADVANCE CARE PLAN OR EQUIV PRESENT IN MEDICAL RECORD: ICD-10-PCS | Mod: HCNC,S$GLB,, | Performed by: OPHTHALMOLOGY

## 2020-11-12 PROCEDURE — 92285 EXTERNAL PHOTOGRAPHY - OU - BOTH EYES: ICD-10-PCS | Mod: HCNC,S$GLB,, | Performed by: OPHTHALMOLOGY

## 2020-11-12 PROCEDURE — 92014 COMPRE OPH EXAM EST PT 1/>: CPT | Mod: HCNC,S$GLB,, | Performed by: OPHTHALMOLOGY

## 2020-11-12 PROCEDURE — 1157F ADVNC CARE PLAN IN RCRD: CPT | Mod: HCNC,S$GLB,, | Performed by: OPHTHALMOLOGY

## 2020-11-12 PROCEDURE — 99999 PR PBB SHADOW E&M-EST. PATIENT-LVL III: CPT | Mod: PBBFAC,HCNC,, | Performed by: OPHTHALMOLOGY

## 2020-11-12 PROCEDURE — 99999 PR PBB SHADOW E&M-EST. PATIENT-LVL III: ICD-10-PCS | Mod: PBBFAC,HCNC,, | Performed by: OPHTHALMOLOGY

## 2020-11-12 PROCEDURE — 92285 EXTERNAL OCULAR PHOTOGRAPHY: CPT | Mod: HCNC,S$GLB,, | Performed by: OPHTHALMOLOGY

## 2020-11-12 PROCEDURE — 92014 PR EYE EXAM, EST PATIENT,COMPREHESV: ICD-10-PCS | Mod: HCNC,S$GLB,, | Performed by: OPHTHALMOLOGY

## 2020-11-12 NOTE — LETTER
November 28, 2020      Radha Mcclure MD  [address]  [place] [address]           [facility] [address]st [address]  [address]  [place] [address]  Phone: [phone]  Fax: [phone]          Patient: Janusz Montgomery Jr.   MR Number: [id number]   YOB: 1945   Date of Visit: 11/12/2020       Dear Dr. Radha Mcclure:    Thank you for referring Janusz Montgomery to me for evaluation. Attached you will find relevant portions of my assessment and plan of care.    If you have questions, please do not hesitate to call me. I look forward to following Janusz Montgomery along with you.    Sincerely,    Teresa Cooper MD    Enclosure  CC:  No Recipients    If you would like to receive this communication electronically, please contact [email or web address] or [phone] to request more information on [employer] Link access.    For providers and/or their staff who would like to refer a patient to [facility], please contact us through our one-stop-shop provider referral line, [facility], at [phone].    If you feel you have received this communication in error or would no longer like to receive these types of communications, please e-mail [email or web address]

## 2020-11-12 NOTE — PROGRESS NOTES
HPI     Pt is here today for Pre op for MOH's and reconstruction OS per Dr Mcclure.  Pt states the lesion has been there for years but did not start changing   until a few months ago.    Eye Meds:  OTC Tears prn OU    Last edited by Pollo Etienne on 11/12/2020  4:09 PM. (History)            Assessment /Plan     For exam results, see Encounter Report.    Basal cell carcinoma (BCC) of medial canthus of left eye    Floppy eyelid syndrome    Herpes simplex virus stromal keratitis      The patient is a pleasant 75-year-old male here for evaluation of left medial canthus basal cell carcinoma.  The patient has had a prior excision and reconstruction of the left medial canthus previously.  He will undergo Mohs surgery with my colleague Dr. Mcclure, any he will return to me for the reconstruction.    Patient will undergo Mohs surgery and return for reconstruction. Due to the unknown size of the potential defect, reconstructive options for eyelid and periorbital reconstruction including Bronson flaps, advancement and rotation flaps, skin grafts, direct closure, and silicone intubation of the lacrimal system were discussed. All questions were answered.    Informed consent obtained after extensive risks/benefits/alternatives were discussed with the patient including but not limited to pain, bleeding, infection, ocular injury, loss of the eye, asymmetry, need for revision in future, scarring.  Alternatives such as waiting were discussed.  All questions were answered.      Hold ASA, NSAIDS, and fish oil 5 to 7 days prior to procedure.    Needs cardiology clearance to ideally hold Plavix for 7 days and eliquis for three days. Cardiac stent placement in December of 2019.     Return for surgery

## 2020-11-13 NOTE — PROGRESS NOTES
Janusz Montgomery Jr.  11/5/2020    HPI:  Patient is a 75 y.o. male who is  being managed by PCP and specialists who is here today for evaluation of PVD.  Presents as referral for mgmt of LLE wound; history of non healing for which bx obtained showing BCC.  Treating with cream due to poor radiation candidate.  Seeing wound care at [facility].  Patient has no complaints of claudication.  Patient states location is L medial lower leg occurring for several months.  Associated signs and symptoms include discoloration.  Quality is heavy and severity is 6/10.  Symptoms began several months ago.  Alleviating factors include wound care.  Worsening factors include dependency.  no rest pain.  + tissue loss.  Patient is not diabetic.  Is not on Pletal.  No previous lower extremity interventions.         Past Medical History:   Diagnosis Date    NAT (acute kidney injury) 3/19/2017    ALLERGIC RHINITIS     Anemia     Anxiety     Basal cell carcinoma 10/19/2018    forehead and right medial shoulder    Basal cell carcinoma 01/09/2019    left nasal bridge and left posterior ear    Basal cell carcinoma 06/12/2020    left lower medial leg - Efudex    Chronic rhinitis 5/3/2013    Chronic rhinitis 5/3/2013    Coronary artery disease involving native coronary artery of native heart without angina pectoris s/p RCA stent     Cortical cataract of both eyes 3/18/2016    Delayed sleep phase syndrome 3/13/2019    Depression     Erectile dysfunction 3/24/2014    Erectile dysfunction 3/24/2014    Essential hypertension     GERD (gastroesophageal reflux disease) 7/25/2012    Gout, arthritis     Grade III open fracture of left tibia and fibula s/p ex-fix on 7/1/16 and ORIF of left tibia on 7/15 7/6/2016    H/O: iritis     Helicobacter pylori (H. pylori) infection     Treated    Herpes simplex keratoconjunctivitis 9/30/2015    - on acyclovir - followed by opthalmology, Dr. Uribe     Herpes simplex keratoconjunctivitis 9/30/2015    -  "on acyclovir - followed by opthalmology, Dr. Uribe     Hyperkalemia 2/28/2017    Hyperlipidemia     Hypogonadism male     Hypogonadism male     Mixed anxiety and depressive disorder     Morbid obesity     Obstructive sleep apnea on CPAP     CPAP    Osteoarthritis of left knee 7/25/2012    Paroxysmal atrial fibrillation 7/6/2016    Primary osteoarthritis of left knee 7/25/2012    Prominent aorta 1/25/2016    "RESULTS: THE HEART IS MILDLY ENLARGED WITH A SLIGHTLY PROMINENT AORTA" - Xray Chest PA & Lateral 12-     Prostate cancer 2/15/2016    - followed by urology, Dr. Young     Prostate cancer 2/15/2016    - followed by urology, Dr. 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 Conteh    CATARACT EXTRACTION W/  INTRAOCULAR LENS IMPLANT Left 4/12/2016        COLONOSCOPY N/A 7/23/2020    Procedure: COLONOSCOPY;  Surgeon: Nico Lackey MD;  Location: [address]);  Service: Endoscopy;  Laterality: N/A;    ESOPHAGOGASTRODUODENOSCOPY N/A 7/23/2020    Procedure: EGD (ESOPHAGOGASTRODUODENOSCOPY);  Surgeon: Nico Lackey MD;  Location: [address]);  Service: Endoscopy;  Laterality: N/A;  per Dr Lackey-Will proceed with EGD and colonoscopy on the 2nd floor due to his comorbidities including obesity, sleep apnea, restrictive lung disease, coronary artery disease.  has loop recorder      ok to hold Eliquis 2 " "days per Dr Sandhu-must remain    EXTERNAL FIXATION TIBIAL FRACTURE Left 07/01/2016    INSERTION OF IMPLANTABLE LOOP RECORDER  04/07/2017    LEFT HEART CATHETERIZATION Left 1/30/2020    Procedure: Left heart cath;  Surgeon: Benjamin Sandhu MD;  Location: [facility] CATH LAB;  Service: Cardiology;  Laterality: Left;  RN PREOP 1/28/20--Pt starting Plavix loading dose today (8pills)- Dr. Sandhu aware.  Pt has a bandaged "non healing area to LLE"--Dr. Sandhu aware.    LEFT HEART CATHETERIZATION Left 2/14/2020    Procedure: Left heart cath, IVUS guided left main / LAD PCI. Noon start, radial approach;  Surgeon: Miguel Angel Brady MD;  Location: [facility] CATH LAB;  Service: Cardiology;  Laterality: Left;  RN PRE OP 2-7-2020  BMI--45.61    ORIF TIBIA FRACTURE Left 07/15/2016    RADIOACTIVE SEED IMPLANTATION OF PROSTATE N/A 8/8/2018    Procedure: INSERTION, RADIOACTIVE SEED, PROSTATE;  Surgeon: Bipin Thompson MD;  Location: [address];  Service: Urology;  Laterality: N/A;  1 hour    Squamous cell cancer removal x3 with Mohs surgery      TONSILLECTOMY      TOTAL KNEE ARTHROPLASTY  10/2012    TRIGGER FINGER RELEASE Right 10/14/2020    Procedure: RELEASE, TRIGGER FINGER - right;  Surgeon: Rad Swift MD;  Location: [facility];  Service: Orthopedics;  Laterality: Right;    trus/bx      ULTRASOUND GUIDANCE  2/14/2020    Procedure: Ultrasound Guidance;  Surgeon: Miguel Angle Brady MD;  Location: [facility] CATH LAB;  Service: Cardiology;;     Family History   Problem Relation Age of Onset    Skin cancer Father     Lung cancer Father     Cancer Father         smoker,     Alzheimer's disease Mother     Hypertension Mother     Cancer Sister         colon, lung cancer     No Known Problems Sister     Cancer Brother         skin cancer, polypectomy     Peripheral vascular disease Unknown     No Known Problems Maternal Aunt     No Known Problems Maternal Uncle     No Known Problems Paternal Aunt     No Known Problems Paternal " Uncle     No Known Problems Maternal Grandmother     No Known Problems Maternal Grandfather     No Known Problems Paternal Grandmother     No Known Problems Paternal Grandfather     Melanoma Neg Hx     Psoriasis Neg Hx     Lupus Neg Hx     Eczema Neg Hx     Amblyopia Neg Hx     Blindness Neg Hx     Cataracts Neg Hx     Diabetes Neg Hx     Glaucoma Neg Hx     Macular degeneration Neg Hx     Retinal detachment Neg Hx     Strabismus Neg Hx     Stroke Neg Hx     Thyroid disease Neg Hx     Acne Neg Hx      Social History     Socioeconomic History    Marital status:      Spouse name: Not on file    Number of children: Not on file    Years of education: Not on file    Highest education level: Not on file   Occupational History    Occupation: Retired    Social Needs    Financial resource strain: Not hard at all    Food insecurity     Worry: Never true     Inability: Never true    Transportation needs     Medical: No     Non-medical: No   Tobacco Use    Smoking status: Never Smoker    Smokeless tobacco: Never Used   Substance and Sexual Activity    Alcohol use: Yes     Frequency: 2-4 times a month     Drinks per session: 3 or 4     Binge frequency: Never     Comment: occasionally, beer    Drug use: Never    Sexual activity: Yes     Partners: Female   Lifestyle    Physical activity     Days per week: 0 days     Minutes per session: 0 min    Stress: Only a little   Relationships    Social connections     Talks on phone: Twice a week     Gets together: Once a week     Attends Congregation service: More than 4 times per year     Active member of club or organization: Yes     Attends meetings of clubs or organizations: More than 4 times per year     Relationship status:    Other Topics Concern    Not on file   Social History Narrative    Not on file     Current Outpatient Medications on File Prior to Visit   Medication Sig    acyclovir (ZOVIRAX) 800 MG Tab Take 1 tablet (800 mg  total) by mouth 2 (two) times daily.    albuterol (PROVENTIL/VENTOLIN HFA) 90 mcg/actuation inhaler Inhale 2 puffs into the lungs every 6 (six) hours as needed for Wheezing. Rescue    apixaban (ELIQUIS) 5 mg Tab Take 1 tablet (5 mg total) by mouth 2 (two) times daily.    artificial tears (ISOPTO TEARS) 0.5 % ophthalmic solution Place 1 drop into both eyes as needed.    atorvastatin (LIPITOR) 40 MG tablet Take 1 tablet (40 mg total) by mouth once daily.    CYANOCOBALAMIN, VITAMIN B-12, (VITAMIN B-12 ORAL) Take 2,500 mcg by mouth once daily.    fluticasone propionate (FLONASE) 50 mcg/actuation nasal spray 1 spray (50 mcg total) by Each Nostril route once daily.    gabapentin (NEURONTIN) 300 MG capsule Take 1 capsule (300 mg total) by mouth 3 (three) times daily.    melatonin 5 mg Tab Take 10 mg by mouth nightly.    metoprolol succinate (TOPROL-XL) 25 MG 24 hr tablet Take 25 mg by mouth once daily.     multivitamin (MULTIPLE VITAMINS) per tablet Take 1 tablet by mouth once daily.    nitroGLYCERIN (NITROSTAT) 0.4 MG SL tablet Place 1 tablet (0.4 mg total) under the tongue every 5 (five) minutes as needed for Chest pain.    omeprazole (PRILOSEC) 20 MG capsule Take 1 capsule (20 mg total) by mouth daily as needed.    oxybutynin (DITROPAN-XL) 5 MG TR24 Take 1 tablet (5 mg total) by mouth once daily.    tamsulosin (FLOMAX) 0.4 mg Cap Take 1 capsule (0.4 mg total) by mouth once daily.    triamcinolone acetonide 0.1% (KENALOG) 0.1 % cream Apply topically 2 (two) times daily. Apply to affected area twice daily as directed.    venlafaxine (EFFEXOR) 75 MG tablet Take 2 tablets (150 mg total) by mouth 2 (two) times daily.    vitamin D 1000 units Tab Take 1 tablet (1,000 Units total) by mouth once daily.    clopidogreL (PLAVIX) 75 mg tablet Take 1 tablet (75 mg total) by mouth once daily.    HYDROcodone-acetaminophen (NORCO) 7.5-325 mg per tablet Take 1 tablet by mouth every 6 (six) hours as needed.     ondansetron (ZOFRAN-ODT) 8 MG TbDL Dissolve 1 tablet (8 mg total) by mouth every 6 (six) hours as needed.     No current facility-administered medications on file prior to visit.        REVIEW OF SYSTEMS:  General: negative; ENT: negative; Allergy and Immunology: negative; Hematological and Lymphatic: Negative; Endocrine: negative; Respiratory: no cough, shortness of breath, or wheezing; Cardiovascular: no chest pain or dyspnea on exertion; Gastrointestinal: no abdominal pain/back, change in bowel habits, or bloody stools; Genito-Urinary: no dysuria, trouble voiding, or hematuria; Musculoskeletal: negative  Neurological: no TIA or stroke symptoms    PHYSICAL EXAM:      Pulse: 86  Temp: 97 °F (36.1 °C)      General appearance:  Alert, well-appearing, and in no distress.  Oriented to person, place, and time   Neurological: Normal speech, no focal findings noted; CN II - XII grossly intact           Musculoskeletal: Digits/nail without cyanosis/clubbing.  Normal muscle strength/tone.                 Neck: Supple, no significant adenopathy; thyroid is not enlarged                  No carotid bruit can be auscultated                Chest:  Clear to auscultation, no wheezes, rales or rhonchi, symmetric air entry     No use of accessory muscles             Cardiac: Normal rate and regular rhythm, S1 and S2 normal; PMI non-displaced          Abdomen: Soft, nontender, nondistended, no masses or organomegaly     No rebound tenderness noted; bowel sounds normal     Pulsatile aortic mass is note palpable.     No groin adenopathy      Extremities:   2+ R femoral pulse, 2+ L femoral pulse                                      2+ R popliteal pulse, 2+ L popliteal pulse                                      1+ R PT pulse, 1+ L PT pulse                                      1+ R DP pulse, 1+ L DP pulse                                      1+ RLE edema, 2+ LLE edema                          Skin:   RLE no tissue loss; LLE + tissue  loss       LAB RESULTS:  Lab Results   Component Value Date    K 5.1 10/22/2020    K 5.2 (H) 09/04/2020    K 5.2 (H) 08/31/2020    CREATININE 1.3 10/22/2020    CREATININE 1.9 (H) 09/04/2020    CREATININE 2.3 (H) 08/31/2020     Lab Results   Component Value Date    WBC 10.64 10/22/2020    WBC 9.37 08/31/2020    WBC 9.17 08/27/2020    HCT 34.2 (L) 10/22/2020    HCT 33.3 (L) 08/31/2020    HCT 34.6 (L) 08/27/2020     10/22/2020     (L) 08/31/2020     (L) 08/27/2020     Lab Results   Component Value Date    HGBA1C 5.6 02/11/2020    HGBA1C 5.3 07/11/2016    HGBA1C 5.3 03/31/2015         Procedure:    Janusz was seen in the clinic room and placed in the supine position on the treatment table.  The left leg was cleansed with Easi-clense sponges and dried thoroughly.  Eucerin cream was applied to the lower legs. Calmoseptine was applied to the periwound area. A hydrofiber dressing was applied to the wound and covered with an ABD pad.  The patient's foot was positioned at a 90 degree angle.  The coflex with calamine was applied per package instructions.  A two layered application was performed using a spiral technique beginning with the foam layer followed by the cohesive bandage avoiding creases or folds.  The wrap was started behind the first metatarsal and ended below the tibial tubercle of the knee.  There was overlap of each turn half the width of the previous turn.  The compression wrap will be changed every 7 days.    IMP/PLAN:  75 year old patient of Dr. Chaudhry for wound care follow up.    Assessment:       1. Delayed surgical wound healing, initial encounter    2. Varicose veins of leg with complications    3. Venous stasis dermatitis of both lower extremities    4. Bilateral leg edema                Plan:          Benedryl 50 mg every 4 hours as needed for itching.  Triamncinolone cream to affected area on legs.  Coflex compression wrap with calamine left lower leg as detailed above.  Patient  was warned not to get the dressings wet and to use cast covers for showering.  Should the dressing become wet, he is to remove it, place a wet-to-dry dressing over the wound, cover with gauze and roll gauze and use ace wraps for compression and to secure bandages.  He should then notify this office as soon as possible to have a new dressing applied.  Return to clinic in one week.       Jerod Escalona MD  Vascular & Endovascular Surgery

## 2020-11-16 ENCOUNTER — TELEPHONE (OUTPATIENT)
Dept: OPHTHALMOLOGY | Facility: CLINIC | Age: 75
End: 2020-11-16

## 2020-11-16 DIAGNOSIS — C44.111 BASAL CELL CARCINOMA (BCC) OF EYELID, UNSPECIFIED LATERALITY: Primary | ICD-10-CM

## 2020-11-16 DIAGNOSIS — Z03.818 ENCOUNTER FOR OBSERVATION FOR SUSPECTED EXPOSURE TO OTHER BIOLOGICAL AGENTS RULED OUT: ICD-10-CM

## 2020-11-16 DIAGNOSIS — Z13.9 SCREENING FOR UNSPECIFIED CONDITION: ICD-10-CM

## 2020-11-18 ENCOUNTER — PATIENT OUTREACH (OUTPATIENT)
Dept: ADMINISTRATIVE | Facility: HOSPITAL | Age: 75
End: 2020-11-18

## 2020-11-18 NOTE — PROGRESS NOTES
[employer] self reporting blood pressure report - recent visit with a blood pressure reading within normal limits @ 131/64

## 2020-11-23 ENCOUNTER — LAB VISIT (OUTPATIENT)
Dept: LAB | Facility: HOSPITAL | Age: 75
End: 2020-11-23
Attending: NURSE PRACTITIONER
Payer: MEDICARE

## 2020-11-23 DIAGNOSIS — N18.30 CKD (CHRONIC KIDNEY DISEASE) STAGE 3, GFR 30-59 ML/MIN: ICD-10-CM

## 2020-11-23 LAB
ALBUMIN SERPL BCP-MCNC: 3.7 G/DL (ref 3.5–5.2)
ANION GAP SERPL CALC-SCNC: 9 MMOL/L (ref 8–16)
BASOPHILS # BLD AUTO: 0.1 K/UL (ref 0–0.2)
BASOPHILS NFR BLD: 0.8 % (ref 0–1.9)
BUN SERPL-MCNC: 26 MG/DL (ref 8–23)
CALCIUM SERPL-MCNC: 8.6 MG/DL (ref 8.7–10.5)
CHLORIDE SERPL-SCNC: 106 MMOL/L (ref 95–110)
CO2 SERPL-SCNC: 25 MMOL/L (ref 23–29)
CREAT SERPL-MCNC: 1.5 MG/DL (ref 0.5–1.4)
DIFFERENTIAL METHOD: ABNORMAL
EOSINOPHIL # BLD AUTO: 0.3 K/UL (ref 0–0.5)
EOSINOPHIL NFR BLD: 2.7 % (ref 0–8)
ERYTHROCYTE [DISTWIDTH] IN BLOOD BY AUTOMATED COUNT: 13.2 % (ref 11.5–14.5)
EST. GFR  (AFRICAN AMERICAN): 53 ML/MIN/1.73 M^2
EST. GFR  (NON AFRICAN AMERICAN): 46 ML/MIN/1.73 M^2
GLUCOSE SERPL-MCNC: 93 MG/DL (ref 70–110)
HCT VFR BLD AUTO: 35.2 % (ref 40–54)
HGB BLD-MCNC: 11.6 G/DL (ref 14–18)
IMM GRANULOCYTES # BLD AUTO: 0.02 K/UL (ref 0–0.04)
IMM GRANULOCYTES NFR BLD AUTO: 0.2 % (ref 0–0.5)
LYMPHOCYTES # BLD AUTO: 2.3 K/UL (ref 1–4.8)
LYMPHOCYTES NFR BLD: 19.6 % (ref 18–48)
MCH RBC QN AUTO: 33.3 PG (ref 27–31)
MCHC RBC AUTO-ENTMCNC: 33 G/DL (ref 32–36)
MCV RBC AUTO: 101 FL (ref 82–98)
MONOCYTES # BLD AUTO: 0.8 K/UL (ref 0.3–1)
MONOCYTES NFR BLD: 6.3 % (ref 4–15)
NEUTROPHILS # BLD AUTO: 8.3 K/UL (ref 1.8–7.7)
NEUTROPHILS NFR BLD: 70.4 % (ref 38–73)
NRBC BLD-RTO: 0 /100 WBC
PHOSPHATE SERPL-MCNC: 3.6 MG/DL (ref 2.7–4.5)
PLATELET # BLD AUTO: 203 K/UL (ref 150–350)
PMV BLD AUTO: 10.1 FL (ref 9.2–12.9)
POTASSIUM SERPL-SCNC: 4.5 MMOL/L (ref 3.5–5.1)
PTH-INTACT SERPL-MCNC: 214.8 PG/ML (ref 9–77)
RBC # BLD AUTO: 3.48 M/UL (ref 4.6–6.2)
SODIUM SERPL-SCNC: 140 MMOL/L (ref 136–145)
WBC # BLD AUTO: 11.86 K/UL (ref 3.9–12.7)

## 2020-11-23 PROCEDURE — 80069 RENAL FUNCTION PANEL: CPT | Mod: HCNC

## 2020-11-23 PROCEDURE — 85025 COMPLETE CBC W/AUTO DIFF WBC: CPT | Mod: HCNC

## 2020-11-23 PROCEDURE — 83970 ASSAY OF PARATHORMONE: CPT | Mod: HCNC

## 2020-11-23 PROCEDURE — 36415 COLL VENOUS BLD VENIPUNCTURE: CPT | Mod: HCNC

## 2020-11-24 ENCOUNTER — PATIENT MESSAGE (OUTPATIENT)
Dept: CARDIOLOGY | Facility: CLINIC | Age: 75
End: 2020-11-24

## 2020-11-24 RX ORDER — ASPIRIN 81 MG/1
81 TABLET ORAL DAILY
COMMUNITY
End: 2020-12-17

## 2020-11-24 NOTE — PRE-PROCEDURE INSTRUCTIONS
PREOP INSTRUCTIONS:No solid food ,milk or milk products for 8 hours prior to procedure.Clear liquids are allowed up to 2 hours before procedure.Clear liquids are:water,apple juice,gatorade & powerade.Instructed to follow the surgeon's instructions if they differ from these.Shower instructions as well as directions to the Surgery Center were given.Encouraged to wear loose fitting,comfortable clothing.Medication instructions for pm prior to and am of procedure reviewed.Instructed to avoid taking vitamins,supplements,aspirin and ibuprofen the morning of surgery. Patient's questions and concerns addressed .Patient informed of the current visitor policy and advised patient that one visitor may accompany each patient into the hospital and wait (socially distanced) until a member of the medical team provides an update at the conclusion of the procedure.When they enter the hospital both patient and visitor will have their temperature checked.All visitors are asked to arrive with a mask and to keep their mask on throughout the visit.      Patient denies any side effects or issues with anesthesia or sedation.      Patient does not know arrival time.Explained that this information comes from the surgeon's office and if they haven't heard from them by 2 or 3 pm to call the office.Patient stated an understanding.     WIFE - CHRISTINA WILL BE PROVIDING TRANSPORTATION HOME UPON DISCHARGE.    PT IS AWAITING PRE-OP ANTICOAGULANT THERAPY INSTRUCTIONS PER CARDIOLOGY

## 2020-11-27 ENCOUNTER — LAB VISIT (OUTPATIENT)
Dept: URGENT CARE | Facility: CLINIC | Age: 75
End: 2020-11-27
Payer: MEDICARE

## 2020-11-27 ENCOUNTER — TELEPHONE (OUTPATIENT)
Dept: DERMATOLOGY | Facility: CLINIC | Age: 75
End: 2020-11-27

## 2020-11-27 DIAGNOSIS — Z03.818 ENCOUNTER FOR OBSERVATION FOR SUSPECTED EXPOSURE TO OTHER BIOLOGICAL AGENTS RULED OUT: ICD-10-CM

## 2020-11-27 DIAGNOSIS — Z13.9 SCREENING FOR UNSPECIFIED CONDITION: ICD-10-CM

## 2020-11-27 PROCEDURE — 99211 PR OFFICE/OUTPT VISIT, EST, LEVL I: ICD-10-PCS | Mod: S$GLB,,, | Performed by: NURSE PRACTITIONER

## 2020-11-27 PROCEDURE — U0003 INFECTIOUS AGENT DETECTION BY NUCLEIC ACID (DNA OR RNA); SEVERE ACUTE RESPIRATORY SYNDROME CORONAVIRUS 2 (SARS-COV-2) (CORONAVIRUS DISEASE [COVID-19]), AMPLIFIED PROBE TECHNIQUE, MAKING USE OF HIGH THROUGHPUT TECHNOLOGIES AS DESCRIBED BY CMS-2020-01-R: HCPCS | Mod: HCNC

## 2020-11-27 PROCEDURE — 99211 OFF/OP EST MAY X REQ PHY/QHP: CPT | Mod: S$GLB,,, | Performed by: NURSE PRACTITIONER

## 2020-11-27 NOTE — TELEPHONE ENCOUNTER
Called pt to confirm Mohs procedure for BCC left medial upper eyelid canthus for 11/30/2020 at 7:50. Did Covid screening, negative. Okay to proceed with Mohs as planned.

## 2020-11-28 LAB — SARS-COV-2 RNA RESP QL NAA+PROBE: NOT DETECTED

## 2020-11-30 ENCOUNTER — HOSPITAL ENCOUNTER (OUTPATIENT)
Facility: HOSPITAL | Age: 75
Discharge: HOME | End: 2020-11-30
Attending: OPHTHALMOLOGY | Admitting: OPHTHALMOLOGY
Payer: MEDICARE

## 2020-11-30 ENCOUNTER — ANESTHESIA (OUTPATIENT)
Dept: SURGERY | Facility: HOSPITAL | Age: 75
End: 2020-11-30
Payer: MEDICARE

## 2020-11-30 ENCOUNTER — ANESTHESIA EVENT (OUTPATIENT)
Dept: SURGERY | Facility: HOSPITAL | Age: 75
End: 2020-11-30
Payer: MEDICARE

## 2020-11-30 ENCOUNTER — PROCEDURE VISIT (OUTPATIENT)
Dept: DERMATOLOGY | Facility: CLINIC | Age: 75
End: 2020-11-30
Payer: MEDICARE

## 2020-11-30 VITALS
RESPIRATION RATE: 20 BRPM | OXYGEN SATURATION: 95 % | SYSTOLIC BLOOD PRESSURE: 141 MMHG | BODY MASS INDEX: 41.75 KG/M2 | HEIGHT: 73 IN | HEIGHT: 73 IN | DIASTOLIC BLOOD PRESSURE: 62 MMHG | HEART RATE: 80 BPM | TEMPERATURE: 98 F | WEIGHT: 315 LBS | BODY MASS INDEX: 41.75 KG/M2 | WEIGHT: 315 LBS

## 2020-11-30 DIAGNOSIS — C44.111 BASAL CELL CARCINOMA, EYELID, LEFT: Primary | ICD-10-CM

## 2020-11-30 DIAGNOSIS — C44.111 BASAL CELL CARCINOMA (BCC) OF SKIN OF LEFT EYELID INCLUDING CANTHUS, UNSPECIFIED EYELID: ICD-10-CM

## 2020-11-30 DIAGNOSIS — C44.111 BASAL CELL CARCINOMA (BCC) OF MEDIAL CANTHUS OF LEFT EYE: Primary | ICD-10-CM

## 2020-11-30 PROCEDURE — 25000003 PHARM REV CODE 250: Mod: HCNC | Performed by: NURSE ANESTHETIST, CERTIFIED REGISTERED

## 2020-11-30 PROCEDURE — 63600175 PHARM REV CODE 636 W HCPCS: Mod: HCNC | Performed by: NURSE ANESTHETIST, CERTIFIED REGISTERED

## 2020-11-30 PROCEDURE — 63600175 PHARM REV CODE 636 W HCPCS: Mod: HCNC | Performed by: OPHTHALMOLOGY

## 2020-11-30 PROCEDURE — 36000706: Mod: HCNC | Performed by: OPHTHALMOLOGY

## 2020-11-30 PROCEDURE — 25000003 PHARM REV CODE 250: Mod: HCNC | Performed by: OPHTHALMOLOGY

## 2020-11-30 PROCEDURE — 25000003 PHARM REV CODE 250: Mod: HCNC

## 2020-11-30 PROCEDURE — 99499 UNLISTED E&M SERVICE: CPT | Mod: HCNC,S$GLB,, | Performed by: DERMATOLOGY

## 2020-11-30 PROCEDURE — 71000044 HC DOSC ROUTINE RECOVERY FIRST HOUR: Mod: HCNC | Performed by: OPHTHALMOLOGY

## 2020-11-30 PROCEDURE — 68815 PR PROBE NASOLAC DUCT,INSERT TUBE/STENT: ICD-10-PCS | Mod: 51,HCNC,LT, | Performed by: OPHTHALMOLOGY

## 2020-11-30 PROCEDURE — 37000008 HC ANESTHESIA 1ST 15 MINUTES: Mod: HCNC | Performed by: OPHTHALMOLOGY

## 2020-11-30 PROCEDURE — 17311 MOHS 1 STAGE H/N/HF/G: CPT | Mod: HCNC,S$GLB,, | Performed by: DERMATOLOGY

## 2020-11-30 PROCEDURE — 14060 PR ADJ TISS XFER LID,NOS,EAR <10 SQCM: ICD-10-PCS | Mod: 59,HCNC,, | Performed by: OPHTHALMOLOGY

## 2020-11-30 PROCEDURE — 37000009 HC ANESTHESIA EA ADD 15 MINS: Mod: HCNC | Performed by: OPHTHALMOLOGY

## 2020-11-30 PROCEDURE — D9220A PRA ANESTHESIA: ICD-10-PCS | Mod: HCNC,ANES,, | Performed by: ANESTHESIOLOGY

## 2020-11-30 PROCEDURE — 36000707: Mod: HCNC | Performed by: OPHTHALMOLOGY

## 2020-11-30 PROCEDURE — 71000015 HC POSTOP RECOV 1ST HR: Mod: HCNC | Performed by: OPHTHALMOLOGY

## 2020-11-30 PROCEDURE — 63600175 PHARM REV CODE 636 W HCPCS: Mod: HCNC | Performed by: ANESTHESIOLOGY

## 2020-11-30 PROCEDURE — 68815 PROBE NASOLACRIMAL DUCT: CPT | Mod: 51,HCNC,LT, | Performed by: OPHTHALMOLOGY

## 2020-11-30 PROCEDURE — 17312 MOHS ADDL STAGE: CPT | Mod: HCNC,S$GLB,, | Performed by: DERMATOLOGY

## 2020-11-30 PROCEDURE — 14060 TIS TRNFR E/N/E/L 10 SQ CM/<: CPT | Mod: HCNC,,, | Performed by: OPHTHALMOLOGY

## 2020-11-30 PROCEDURE — D9220A PRA ANESTHESIA: Mod: HCNC,CRNA,, | Performed by: NURSE ANESTHETIST, CERTIFIED REGISTERED

## 2020-11-30 PROCEDURE — 99499 NO LOS: ICD-10-PCS | Mod: HCNC,S$GLB,, | Performed by: DERMATOLOGY

## 2020-11-30 PROCEDURE — D9220A PRA ANESTHESIA: ICD-10-PCS | Mod: HCNC,CRNA,, | Performed by: NURSE ANESTHETIST, CERTIFIED REGISTERED

## 2020-11-30 PROCEDURE — 17312: ICD-10-PCS | Mod: HCNC,S$GLB,, | Performed by: DERMATOLOGY

## 2020-11-30 PROCEDURE — D9220A PRA ANESTHESIA: Mod: HCNC,ANES,, | Performed by: ANESTHESIOLOGY

## 2020-11-30 PROCEDURE — 17311: ICD-10-PCS | Mod: HCNC,S$GLB,, | Performed by: DERMATOLOGY

## 2020-11-30 RX ORDER — FENTANYL CITRATE 50 UG/ML
25 INJECTION, SOLUTION INTRAMUSCULAR; INTRAVENOUS
Status: DISCONTINUED | OUTPATIENT
Start: 2020-11-30 | End: 2020-11-30 | Stop reason: HOSPADM

## 2020-11-30 RX ORDER — ONDANSETRON 2 MG/ML
4 INJECTION INTRAMUSCULAR; INTRAVENOUS DAILY PRN
Status: DISCONTINUED | OUTPATIENT
Start: 2020-11-30 | End: 2020-11-30 | Stop reason: HOSPADM

## 2020-11-30 RX ORDER — HYDROMORPHONE HYDROCHLORIDE 1 MG/ML
0.2 INJECTION, SOLUTION INTRAMUSCULAR; INTRAVENOUS; SUBCUTANEOUS EVERY 5 MIN PRN
Status: DISCONTINUED | OUTPATIENT
Start: 2020-11-30 | End: 2020-11-30 | Stop reason: HOSPADM

## 2020-11-30 RX ORDER — LIDOCAINE HYDROCHLORIDE AND EPINEPHRINE 20; 10 MG/ML; UG/ML
INJECTION, SOLUTION INFILTRATION; PERINEURAL
Status: DISCONTINUED | OUTPATIENT
Start: 2020-11-30 | End: 2020-11-30 | Stop reason: HOSPADM

## 2020-11-30 RX ORDER — HYDROCODONE BITARTRATE AND ACETAMINOPHEN 5; 325 MG/1; MG/1
1 TABLET ORAL EVERY 4 HOURS PRN
Status: DISCONTINUED | OUTPATIENT
Start: 2020-11-30 | End: 2020-11-30

## 2020-11-30 RX ORDER — SODIUM CHLORIDE 0.9 % (FLUSH) 0.9 %
3 SYRINGE (ML) INJECTION
Status: DISCONTINUED | OUTPATIENT
Start: 2020-11-30 | End: 2020-11-30 | Stop reason: HOSPADM

## 2020-11-30 RX ORDER — SODIUM CHLORIDE 9 MG/ML
INJECTION, SOLUTION INTRAVENOUS CONTINUOUS
Status: DISCONTINUED | OUTPATIENT
Start: 2020-11-30 | End: 2020-11-30 | Stop reason: HOSPADM

## 2020-11-30 RX ORDER — CLINDAMYCIN PHOSPHATE 900 MG/50ML
INJECTION, SOLUTION INTRAVENOUS
Status: DISCONTINUED | OUTPATIENT
Start: 2020-11-30 | End: 2020-11-30

## 2020-11-30 RX ORDER — SUCCINYLCHOLINE CHLORIDE 20 MG/ML
INJECTION INTRAMUSCULAR; INTRAVENOUS
Status: DISCONTINUED | OUTPATIENT
Start: 2020-11-30 | End: 2020-11-30

## 2020-11-30 RX ORDER — TETRACAINE HYDROCHLORIDE 5 MG/ML
SOLUTION OPHTHALMIC
Status: DISCONTINUED
Start: 2020-11-30 | End: 2020-11-30 | Stop reason: HOSPADM

## 2020-11-30 RX ORDER — TETRACAINE HYDROCHLORIDE 5 MG/ML
1 SOLUTION OPHTHALMIC ONCE
Status: DISCONTINUED | OUTPATIENT
Start: 2020-11-30 | End: 2020-11-30 | Stop reason: HOSPADM

## 2020-11-30 RX ORDER — NEOMYCIN SULFATE, POLYMYXIN B SULFATE, AND DEXAMETHASONE 3.5; 10000; 1 MG/G; [USP'U]/G; MG/G
OINTMENT OPHTHALMIC
Qty: 3.5 G | Refills: 3 | Status: SHIPPED | OUTPATIENT
Start: 2020-11-30 | End: 2020-12-22 | Stop reason: SDUPTHER

## 2020-11-30 RX ORDER — PROPOFOL 10 MG/ML
VIAL (ML) INTRAVENOUS
Status: DISCONTINUED | OUTPATIENT
Start: 2020-11-30 | End: 2020-11-30

## 2020-11-30 RX ORDER — BUPIVACAINE HYDROCHLORIDE 5 MG/ML
INJECTION, SOLUTION EPIDURAL; INTRACAUDAL
Status: DISCONTINUED | OUTPATIENT
Start: 2020-11-30 | End: 2020-11-30 | Stop reason: HOSPADM

## 2020-11-30 RX ORDER — HYDROCODONE BITARTRATE AND ACETAMINOPHEN 7.5; 325 MG/1; MG/1
1 TABLET ORAL EVERY 6 HOURS PRN
Status: DISCONTINUED | OUTPATIENT
Start: 2020-11-30 | End: 2020-11-30 | Stop reason: HOSPADM

## 2020-11-30 RX ORDER — FENTANYL CITRATE 50 UG/ML
INJECTION, SOLUTION INTRAMUSCULAR; INTRAVENOUS
Status: DISCONTINUED | OUTPATIENT
Start: 2020-11-30 | End: 2020-11-30

## 2020-11-30 RX ORDER — CIPROFLOXACIN AND DEXAMETHASONE 3; 1 MG/ML; MG/ML
SUSPENSION/ DROPS AURICULAR (OTIC)
Status: DISCONTINUED
Start: 2020-11-30 | End: 2020-11-30 | Stop reason: WASHOUT

## 2020-11-30 RX ORDER — VASOPRESSIN 20 [USP'U]/ML
INJECTION, SOLUTION INTRAMUSCULAR; SUBCUTANEOUS
Status: DISCONTINUED | OUTPATIENT
Start: 2020-11-30 | End: 2020-11-30

## 2020-11-30 RX ORDER — LIDOCAINE HYDROCHLORIDE 10 MG/ML
1 INJECTION, SOLUTION EPIDURAL; INFILTRATION; INTRACAUDAL; PERINEURAL ONCE
Status: COMPLETED | OUTPATIENT
Start: 2020-11-30 | End: 2020-11-30

## 2020-11-30 RX ORDER — LIDOCAINE HCL/PF 100 MG/5ML
SYRINGE (ML) INTRAVENOUS
Status: DISCONTINUED | OUTPATIENT
Start: 2020-11-30 | End: 2020-11-30

## 2020-11-30 RX ORDER — FENTANYL CITRATE 50 UG/ML
50 INJECTION, SOLUTION INTRAMUSCULAR; INTRAVENOUS EVERY 5 MIN PRN
Status: DISCONTINUED | OUTPATIENT
Start: 2020-11-30 | End: 2020-11-30 | Stop reason: HOSPADM

## 2020-11-30 RX ORDER — ROCURONIUM BROMIDE 10 MG/ML
INJECTION, SOLUTION INTRAVENOUS
Status: DISCONTINUED | OUTPATIENT
Start: 2020-11-30 | End: 2020-11-30

## 2020-11-30 RX ORDER — HYDROCODONE BITARTRATE AND ACETAMINOPHEN 7.5; 325 MG/1; MG/1
1 TABLET ORAL EVERY 6 HOURS PRN
Qty: 16 TABLET | Refills: 0 | Status: SHIPPED | OUTPATIENT
Start: 2020-11-30 | End: 2021-03-16

## 2020-11-30 RX ORDER — MIDAZOLAM HYDROCHLORIDE 1 MG/ML
INJECTION INTRAMUSCULAR; INTRAVENOUS
Status: DISCONTINUED | OUTPATIENT
Start: 2020-11-30 | End: 2020-11-30

## 2020-11-30 RX ORDER — CLINDAMYCIN PHOSPHATE 900 MG/50ML
INJECTION, SOLUTION INTRAVENOUS
Status: DISCONTINUED
Start: 2020-11-30 | End: 2020-11-30 | Stop reason: HOSPADM

## 2020-11-30 RX ORDER — ONDANSETRON HYDROCHLORIDE 2 MG/ML
INJECTION, SOLUTION INTRAMUSCULAR; INTRAVENOUS
Status: DISCONTINUED | OUTPATIENT
Start: 2020-11-30 | End: 2020-11-30

## 2020-11-30 RX ORDER — EPHEDRINE SULFATE 50 MG/ML
INJECTION, SOLUTION INTRAVENOUS
Status: DISCONTINUED | OUTPATIENT
Start: 2020-11-30 | End: 2020-11-30

## 2020-11-30 RX ORDER — FENTANYL CITRATE 50 UG/ML
25 INJECTION, SOLUTION INTRAMUSCULAR; INTRAVENOUS EVERY 5 MIN PRN
Status: DISCONTINUED | OUTPATIENT
Start: 2020-11-30 | End: 2020-11-30 | Stop reason: HOSPADM

## 2020-11-30 RX ADMIN — PROPOFOL 50 MG: 10 INJECTION, EMULSION INTRAVENOUS at 04:11

## 2020-11-30 RX ADMIN — EPHEDRINE SULFATE 5 MG: 50 INJECTION INTRAVENOUS at 05:11

## 2020-11-30 RX ADMIN — FENTANYL CITRATE 25 MCG: 50 INJECTION, SOLUTION INTRAMUSCULAR; INTRAVENOUS at 06:11

## 2020-11-30 RX ADMIN — VASOPRESSIN 1 UNITS: 20 INJECTION, SOLUTION INTRAMUSCULAR; SUBCUTANEOUS at 05:11

## 2020-11-30 RX ADMIN — ROCURONIUM BROMIDE 20 MG: 10 INJECTION, SOLUTION INTRAVENOUS at 05:11

## 2020-11-30 RX ADMIN — ROCURONIUM BROMIDE 20 MG: 10 INJECTION, SOLUTION INTRAVENOUS at 04:11

## 2020-11-30 RX ADMIN — SUCCINYLCHOLINE CHLORIDE 200 MG: 20 INJECTION, SOLUTION INTRAMUSCULAR; INTRAVENOUS at 04:11

## 2020-11-30 RX ADMIN — HYDROCODONE BITARTRATE AND ACETAMINOPHEN 1 TABLET: 7.5; 325 TABLET ORAL at 07:11

## 2020-11-30 RX ADMIN — PROPOFOL 200 MG: 10 INJECTION, EMULSION INTRAVENOUS at 04:11

## 2020-11-30 RX ADMIN — MIDAZOLAM HYDROCHLORIDE 1 MG: 1 INJECTION, SOLUTION INTRAMUSCULAR; INTRAVENOUS at 04:11

## 2020-11-30 RX ADMIN — LIDOCAINE HYDROCHLORIDE 80 MG: 20 INJECTION, SOLUTION INTRAVENOUS at 04:11

## 2020-11-30 RX ADMIN — ROCURONIUM BROMIDE 20 MG: 10 INJECTION, SOLUTION INTRAVENOUS at 06:11

## 2020-11-30 RX ADMIN — EPHEDRINE SULFATE 10 MG: 50 INJECTION INTRAVENOUS at 05:11

## 2020-11-30 RX ADMIN — ROCURONIUM BROMIDE 30 MG: 10 INJECTION, SOLUTION INTRAVENOUS at 05:11

## 2020-11-30 RX ADMIN — SODIUM CHLORIDE, SODIUM GLUCONATE, SODIUM ACETATE, POTASSIUM CHLORIDE, MAGNESIUM CHLORIDE, SODIUM PHOSPHATE, DIBASIC, AND POTASSIUM PHOSPHATE: .53; .5; .37; .037; .03; .012; .00082 INJECTION, SOLUTION INTRAVENOUS at 05:11

## 2020-11-30 RX ADMIN — FENTANYL CITRATE 25 MCG: 50 INJECTION, SOLUTION INTRAMUSCULAR; INTRAVENOUS at 04:11

## 2020-11-30 RX ADMIN — LIDOCAINE HYDROCHLORIDE 0.5 MG: 10 INJECTION, SOLUTION EPIDURAL; INFILTRATION; INTRACAUDAL at 02:11

## 2020-11-30 RX ADMIN — CLINDAMYCIN PHOSPHATE 900 MG: 18 INJECTION, SOLUTION INTRAVENOUS at 05:11

## 2020-11-30 RX ADMIN — ROCURONIUM BROMIDE 10 MG: 10 INJECTION, SOLUTION INTRAVENOUS at 04:11

## 2020-11-30 RX ADMIN — FENTANYL CITRATE 25 MCG: 50 INJECTION INTRAMUSCULAR; INTRAVENOUS at 03:11

## 2020-11-30 RX ADMIN — SODIUM CHLORIDE: 0.9 INJECTION, SOLUTION INTRAVENOUS at 02:11

## 2020-11-30 RX ADMIN — SUGAMMADEX 400 MG: 100 INJECTION, SOLUTION INTRAVENOUS at 06:11

## 2020-11-30 RX ADMIN — NOREPINEPHRINE BITARTRATE 0.02 MCG/KG/MIN: 1 INJECTION, SOLUTION, CONCENTRATE INTRAVENOUS at 05:11

## 2020-11-30 RX ADMIN — ONDANSETRON 4 MG: 2 INJECTION, SOLUTION INTRAMUSCULAR; INTRAVENOUS at 06:11

## 2020-11-30 NOTE — H&P
Pre-Operative History & Physical  Ophthalmology      SUBJECTIVE:     History of Present Illness:  Patient is a 75 y.o. male with biopsy confirmed left lower eyelid basal cell carcinoma with Moh's defect status post surgical removal by Dr. Mcclure    MEDICATIONS:   PTA Medications   Medication Sig    acyclovir (ZOVIRAX) 800 MG Tab Take 1 tablet (800 mg total) by mouth 2 (two) times daily.    aspirin (ECOTRIN) 81 MG EC tablet Take 81 mg by mouth once daily.    atorvastatin (LIPITOR) 40 MG tablet Take 1 tablet (40 mg total) by mouth once daily. (Patient taking differently: Take 40 mg by mouth every evening. )    clopidogreL (PLAVIX) 75 mg tablet Take 1 tablet (75 mg total) by mouth once daily.    fluticasone furoate-vilanteroL (BREO) 100-25 mcg/dose diskus inhaler Inhale 1 puff into the lungs once daily. Controller    gabapentin (NEURONTIN) 300 MG capsule Take 1 capsule (300 mg total) by mouth 3 (three) times daily.    melatonin 5 mg Tab Take 10 mg by mouth nightly.    metoprolol succinate (TOPROL-XL) 25 MG 24 hr tablet Take 25 mg by mouth once daily.     multivitamin (MULTIPLE VITAMINS) per tablet Take 1 tablet by mouth once daily.    tamsulosin (FLOMAX) 0.4 mg Cap Take 1 capsule (0.4 mg total) by mouth once daily.    venlafaxine (EFFEXOR) 75 MG tablet Take 2 tablets (150 mg total) by mouth 2 (two) times daily.    vitamin D 1000 units Tab Take 1 tablet (1,000 Units total) by mouth once daily.    albuterol (PROVENTIL/VENTOLIN HFA) 90 mcg/actuation inhaler Inhale 2 puffs into the lungs every 6 (six) hours as needed for Wheezing. Rescue    apixaban (ELIQUIS) 5 mg Tab Take 1 tablet (5 mg total) by mouth 2 (two) times daily.    artificial tears (ISOPTO TEARS) 0.5 % ophthalmic solution Place 1 drop into both eyes as needed.    CYANOCOBALAMIN, VITAMIN B-12, (VITAMIN B-12 ORAL) Take 2,500 mcg by mouth once daily.    fluticasone propionate (FLONASE) 50 mcg/actuation nasal spray 1 spray (50 mcg total) by Each  Nostril route once daily.    HYDROcodone-acetaminophen (NORCO) 7.5-325 mg per tablet Take 1 tablet by mouth every 6 (six) hours as needed.    nitroGLYCERIN (NITROSTAT) 0.4 MG SL tablet Place 1 tablet (0.4 mg total) under the tongue every 5 (five) minutes as needed for Chest pain.    omeprazole (PRILOSEC) 20 MG capsule Take 1 capsule (20 mg total) by mouth daily as needed.    oxybutynin (DITROPAN-XL) 5 MG TR24 Take 1 tablet (5 mg total) by mouth once daily.    triamcinolone acetonide 0.1% (KENALOG) 0.1 % cream Apply topically 2 (two) times daily. Apply to affected area twice daily as directed.       ALLERGIES:   Review of patient's allergies indicates:   Allergen Reactions    Ciprofloxacin Rash     Diffuse pruritic morbilliform rash developed 3/15/2017 after dose of cipro; previously in 2/2017 he had rash/fevers after initiation of cipro    Zosyn [piperacillin-tazobactam] Rash     Diffuse pruritic morbilliform rash developed 3/15/2017.  Then, 430am dose on 3/16 and rash worsened with SOB/tachypnea but no hypoxemia.     Bacitracin Itching and Rash     Violaceous rash in area of topical Tx.        PAST MEDICAL HISTORY:   Past Medical History:   Diagnosis Date    NAT (acute kidney injury) 3/19/2017    ALLERGIC RHINITIS     Anemia     Anxiety     Basal cell carcinoma 10/19/2018    forehead and right medial shoulder    Basal cell carcinoma 01/09/2019    left nasal bridge and left posterior ear    Basal cell carcinoma 06/12/2020    left lower medial leg - Efudex    Chronic rhinitis 5/3/2013    Chronic rhinitis 5/3/2013    Coronary artery disease involving native coronary artery of native heart without angina pectoris s/p RCA stent     Cortical cataract of both eyes 3/18/2016    Delayed sleep phase syndrome 3/13/2019    Depression     Erectile dysfunction 3/24/2014    Erectile dysfunction 3/24/2014    Essential hypertension     GERD (gastroesophageal reflux disease) 7/25/2012    Gout, arthritis   "   Grade III open fracture of left tibia and fibula s/p ex-fix on 7/1/16 and ORIF of left tibia on 7/15 7/6/2016    H/O: iritis     Helicobacter pylori (H. pylori) infection     Treated    Herpes simplex keratoconjunctivitis 9/30/2015    - on acyclovir - followed by opthalmology, Dr. Uribe     Herpes simplex keratoconjunctivitis 9/30/2015    - on acyclovir - followed by opthalmology, Dr. Uribe     Hyperkalemia 2/28/2017    Hyperlipidemia     Hypogonadism male     Hypogonadism male     Mixed anxiety and depressive disorder     Morbid obesity     Obstructive sleep apnea on CPAP     CPAP    Osteoarthritis of left knee 7/25/2012    Paroxysmal atrial fibrillation 7/6/2016    Primary osteoarthritis of left knee 7/25/2012    Prominent aorta 1/25/2016    "RESULTS: THE HEART IS MILDLY ENLARGED WITH A SLIGHTLY PROMINENT AORTA" - Xray Chest PA & Lateral 12-     Prostate cancer 2/15/2016    - followed by urology, Dr. Young     Prostate cancer 2/15/2016    - followed by urology, Dr. 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   PAST SURGICAL HISTORY:   Past Surgical History:   Procedure Laterality Date    Cardiac stenting x2      CATARACT EXTRACTION W/  INTRAOCULAR LENS IMPLANT Right 3/29/2016    Dr. Conteh    CATARACT EXTRACTION W/  INTRAOCULAR LENS IMPLANT Left 4/12/2016        COLONOSCOPY N/A 7/23/2020    Procedure: COLONOSCOPY;  Surgeon: Nico Lackey MD;  Location: [facility] ([address] [place] [address]);  " "Service: Endoscopy;  Laterality: N/A;    ESOPHAGOGASTRODUODENOSCOPY N/A 7/23/2020    Procedure: EGD (ESOPHAGOGASTRODUODENOSCOPY);  Surgeon: Nico Lackey MD;  Location: [facility] F[facility]);  Service: Endoscopy;  Laterality: N/A;  per Dr Lackey-Will proceed with EGD and colonoscopy on the 2nd floor due to his comorbidities including obesity, sleep apnea, restrictive lung disease, coronary artery disease.  has loop recorder      ok to hold Eliquis 2 days per Dr Sandhu-must remain    EXTERNAL FIXATION TIBIAL FRACTURE Left 07/01/2016    INSERTION OF IMPLANTABLE LOOP RECORDER  04/07/2017    LEFT HEART CATHETERIZATION Left 1/30/2020    Procedure: Left heart cath;  Surgeon: Benjamin Sandhu MD;  Location: [facility] CATH LAB;  Service: Cardiology;  Laterality: Left;  RN PREOP 1/28/20--Pt starting Plavix loading dose today (8pills)- Dr. Sandhu aware.  Pt has a bandaged "non healing area to LLE"--Dr. Sandhu aware.    LEFT HEART CATHETERIZATION Left 2/14/2020    Procedure: Left heart cath, IVUS guided left main / LAD PCI. Noon start, radial approach;  Surgeon: Miguel Angel Brady MD;  Location: [facility] CATH LAB;  Service: Cardiology;  Laterality: Left;  RN PRE OP 2-7-2020  BMI--45.61    ORIF TIBIA FRACTURE Left 07/15/2016    RADIOACTIVE SEED IMPLANTATION OF PROSTATE N/A 8/8/2018    Procedure: INSERTION, RADIOACTIVE SEED, PROSTATE;  Surgeon: Bipin Thompson MD;  Location: [facility] 1ST FLR;  Service: Urology;  Laterality: N/A;  1 hour    Squamous cell cancer removal x3 with Mohs surgery      TONSILLECTOMY      TOTAL KNEE ARTHROPLASTY  10/2012    TRIGGER FINGER RELEASE Right 10/14/2020    Procedure: RELEASE, TRIGGER FINGER - right;  Surgeon: Rad Swift MD;  Location: [facility];  Service: Orthopedics;  Laterality: Right;    trus/bx      ULTRASOUND GUIDANCE  2/14/2020    Procedure: Ultrasound Guidance;  Surgeon: Miguel Angel Brady MD;  Location: [facility] CATH LAB;  Service: Cardiology;;     PAST FAMILY HISTORY:   Family History "   Problem Relation Age of Onset    Skin cancer Father     Lung cancer Father     Cancer Father         smoker,     Alzheimer's disease Mother     Hypertension Mother     Cancer Sister         colon, lung cancer     No Known Problems Sister     Cancer Brother         skin cancer, polypectomy     Peripheral vascular disease Unknown     No Known Problems Maternal Aunt     No Known Problems Maternal Uncle     No Known Problems Paternal Aunt     No Known Problems Paternal Uncle     No Known Problems Maternal Grandmother     No Known Problems Maternal Grandfather     No Known Problems Paternal Grandmother     No Known Problems Paternal Grandfather     Melanoma Neg Hx     Psoriasis Neg Hx     Lupus Neg Hx     Eczema Neg Hx     Amblyopia Neg Hx     Blindness Neg Hx     Cataracts Neg Hx     Diabetes Neg Hx     Glaucoma Neg Hx     Macular degeneration Neg Hx     Retinal detachment Neg Hx     Strabismus Neg Hx     Stroke Neg Hx     Thyroid disease Neg Hx     Acne Neg Hx      SOCIAL HISTORY:   Social History     Tobacco Use    Smoking status: Never Smoker    Smokeless tobacco: Never Used   Substance Use Topics    Alcohol use: Yes     Frequency: 2-4 times a month     Drinks per session: 3 or 4     Binge frequency: Never     Comment: occasionally, beer    Drug use: Never        MENTAL STATUS: Alert    REVIEW OF SYSTEMS: Negative    OBJECTIVE:     Vital Signs (Most Recent)       Physical Exam:  General: NAD  HEENT:  Atraumatic  Lungs: Adequate respirations  Heart: + pulses  Abdomen: Soft    ASSESSMENT/PLAN:     Patient is a 75 y.o. male with biopsy confirmed left lower eyelid basal cell carcinoma with Moh's defect status post surgical removal by Dr. Mcclure     - Plan for surgical correction of Moh's eyelid defect, left eye   - Risks/benefits/alternatives of the procedure including, but not limited to scarring, bleeding, infection, loss or decreased vision, and/or need for possible repeat surgery  discussed with the patient and family.   - Informed consent obtained prior to surgery and the patient/family voiced good understanding.    Radha Wallace MD  11/30/2020

## 2020-11-30 NOTE — ANESTHESIA PROCEDURE NOTES
Intubation  Performed by: Malini Cruz CRNA  Authorized by: Hakan Reyes Jr., MD     Intubation:     Induction:  Intravenous    Intubated:  Postinduction    Mask Ventilation:  Moderately difficult with oral airway    Attempts:  1    Attempted By:  CRNA    Method of Intubation:  Direct    Blade:  Lopes 2    Laryngeal View Grade: Grade IIA - cords partially seen      Difficult Airway Encountered?: No      Complications:  None    Airway Device:  Oral endotracheal tube    Airway Device Size:  7.5    Style/Cuff Inflation:  Cuffed (inflated to minimal occlusive pressure)    Tube secured:  22    Secured at:  The lips    Placement Verified By:  Capnometry    Complicating Factors:  None    Findings Post-Intubation:  BS equal bilateral

## 2020-11-30 NOTE — ANESTHESIA PREPROCEDURE EVALUATION
11/30/2020  Janusz Montgomery Jr. is a 75 y.o., male  with biopsy confirmed left lower eyelid basal cell carcinoma with Moh's defect status post surgical removal by Dr. Mcclure      - Plan for surgical correction of Moh's eyelid defect, left eye    PET scan Conclusion 2020         There is a very small sized, mild intensity  inferobasilar involving 5% of the LV myocardium.  After pharmacologic stress this defect improves indicative of non-transmural scar.    There is a very small (<5%) sized, mild intensity  inferoapical stress induced perfusion abnormality involving 2.0% of LV myocardium in the distribution of the distal PDA territory.    Whole heart absolute myocardial perfusion (cc/min/g) averaged 1.03 cc/min/g at rest (which is elevated), 1.25 cc/min/g at stress (which is mildly reduced), and 1.24 CFR (which is severely reduced in part due to elevated resting flow).    Gated perfusion images showed an ejection fraction of 65% at rest and 83% during stress.    Normal ejection fraction is greater than 51%.    Wall motion was normal at rest and during stress.    LV cavity size is normal at rest and stress.    The EKG portion of this study is negative for ischemia.    There were no arrhythmias during stress.    The patient reported no chest pain during the stress test.    When compared to the prior study on 9/26/2012, there is a new small inferobasilar fixed defect consistent with a non-transmural infarct and a small new inferoapical reversible defect. Overall whole heart absolute stress myocardial blood flow has not changed         Anesthesia Evaluation    I have reviewed the Patient Summary Reports.    I have reviewed the Nursing Notes. I have reviewed the NPO Status.   I have reviewed the Medications.     Review of Systems  Anesthesia Hx:  No problems with previous Anesthesia  History of prior  surgery of interest to airway management or planning: Previous anesthesia: General Denies Family Hx of Anesthesia complications.   Denies Personal Hx of Anesthesia complications.   Social:  Non-Smoker, Social Alcohol Use    Hematology/Oncology:  Hematology Normal   Oncology Normal     EENT/Dental:EENT/Dental Normal   Cardiovascular:   Exercise tolerance: good Hypertension, well controlled CAD asymptomatic CABG/stent  PURDY ECG has been reviewed. LM stents x3, RCA old stent in place   Pulmonary:   Shortness of breath Sleep Apnea    Renal/:   Chronic Renal Disease    Hepatic/GI:   GERD    Musculoskeletal:   Arthritis     Neurological:  Neurology Normal    Endocrine:  Endocrine Normal    Dermatological:  Skin Normal    Psych:   Psychiatric History          Physical Exam  General:  Morbid Obesity    Airway/Jaw/Neck:  Airway Findings: Mouth Opening: Normal Tongue: Normal  General Airway Assessment: Adult  Mallampati: II  TM Distance: Normal, at least 6 cm  Jaw/Neck Findings:  Micrognathia: Negative Neck ROM: Normal ROM  Neck Findings:  Large Malhotra      Dental:  Dental Findings: In tact   Chest/Lungs:  Chest/Lungs Findings: Normal Respiratory Rate, Clear to auscultation     Heart/Vascular:  Heart Findings: Rate: Normal  Rhythm: Regular Rhythm  Sounds: Normal  Heart murmur: negative    Abdomen:  Abdomen Findings:  Normal, Nontender, Soft       Mental Status:  Mental Status Findings:  Cooperative, Alert and Oriented         Anesthesia Plan  Type of Anesthesia, risks & benefits discussed:  Anesthesia Type:  general  Patient's Preference:   Intra-op Monitoring Plan: standard ASA monitors  Intra-op Monitoring Plan Comments:   Post Op Pain Control Plan: multimodal analgesia, IV/PO Opioids PRN and per primary service following discharge from PACU  Post Op Pain Control Plan Comments:   Induction:   IV  Beta Blocker:  Patient is not currently on a Beta-Blocker (No further documentation required).       Informed Consent: Patient  understands risks and agrees with Anesthesia plan.  Questions answered. Anesthesia consent signed with patient.  ASA Score: 2     Day of Surgery Review of History & Physical:    H&P update referred to the surgeon.         Ready For Surgery From Anesthesia Perspective.

## 2020-11-30 NOTE — DISCHARGE SUMMARY
Discharge Note  Short Stay      SUMMARY     Admit Date: 11/30/2020    Attending Physician: Teresa Cooper MD    Discharge Physician: Radha Wallace MD    Discharge Date: 11/30/2020 3:59 PM    Final Diagnosis: Post-Op Diagnosis Codes:     * Basal cell carcinoma (BCC) of lower eyelid [C44.111]    Hospital Course: The patient underwent uncomplicated eyelid reconstruction for a Moh's defect under general anesthesia with deep sedation and was discharged after recovery to be followed as an outpatient in the clinic.  Disposition: discharged home    Patient Instructions:   Current Discharge Medication List      START taking these medications    Details   !! HYDROcodone-acetaminophen (NORCO) 7.5-325 mg per tablet Take 1 tablet by mouth every 6 (six) hours as needed for Pain.  Qty: 16 tablet, Refills: 0    Comments: Quantity prescribed more than 7 day supply? Yes, quantity medically necessary      neomycin-polymyxin-dexamethasone (MAXITROL) 3.5 mg/g-10,000 unit/g-0.1 % Oint Apply thin ribbon to left eyelid wounds  Qty: 3.5 g, Refills: 3       !! - Potential duplicate medications found. Please discuss with provider.      CONTINUE these medications which have NOT CHANGED    Details   acyclovir (ZOVIRAX) 800 MG Tab Take 1 tablet (800 mg total) by mouth 2 (two) times daily.  Qty: 180 tablet, Refills: 6    Associated Diagnoses: Herpes simplex disciform keratitis      artificial tears (ISOPTO TEARS) 0.5 % ophthalmic solution Place 1 drop into both eyes as needed.      atorvastatin (LIPITOR) 40 MG tablet Take 1 tablet (40 mg total) by mouth once daily.  Qty: 90 tablet, Refills: 1    Associated Diagnoses: Hyperlipidemia, unspecified hyperlipidemia type      clopidogreL (PLAVIX) 75 mg tablet Take 1 tablet (75 mg total) by mouth once daily.  Qty: 90 tablet, Refills: 3      CYANOCOBALAMIN, VITAMIN B-12, (VITAMIN B-12 ORAL) Take 2,500 mcg by mouth once daily.      fluticasone furoate-vilanteroL (BREO) 100-25 mcg/dose diskus inhaler  Inhale 1 puff into the lungs once daily. Controller  Qty: 60 each, Refills: 3    Associated Diagnoses: Small airways disease      gabapentin (NEURONTIN) 300 MG capsule Take 1 capsule (300 mg total) by mouth 3 (three) times daily.  Qty: 270 capsule, Refills: 1    Associated Diagnoses: Type III open fracture of left tibia and fibula with routine healing      melatonin 5 mg Tab Take 10 mg by mouth nightly.      metoprolol succinate (TOPROL-XL) 25 MG 24 hr tablet Take 25 mg by mouth once daily.       multivitamin (MULTIPLE VITAMINS) per tablet Take 1 tablet by mouth once daily.      oxybutynin (DITROPAN-XL) 5 MG TR24 Take 1 tablet (5 mg total) by mouth once daily.  Qty: 30 tablet, Refills: 11    Associated Diagnoses: Prostate cancer      tamsulosin (FLOMAX) 0.4 mg Cap Take 1 capsule (0.4 mg total) by mouth once daily.  Qty: 90 capsule, Refills: 3    Associated Diagnoses: Urinary frequency      venlafaxine (EFFEXOR) 75 MG tablet Take 2 tablets (150 mg total) by mouth 2 (two) times daily.  Qty: 360 tablet, Refills: 1    Associated Diagnoses: Major depressive disorder, recurrent episode, mild      vitamin D 1000 units Tab Take 1 tablet (1,000 Units total) by mouth once daily.      albuterol (PROVENTIL/VENTOLIN HFA) 90 mcg/actuation inhaler Inhale 2 puffs into the lungs every 6 (six) hours as needed for Wheezing. Rescue  Qty: 18 g, Refills: 0    Associated Diagnoses: Shortness of breath      apixaban (ELIQUIS) 5 mg Tab Take 1 tablet (5 mg total) by mouth 2 (two) times daily.  Qty: 180 tablet, Refills: 4    Associated Diagnoses: Paroxysmal atrial fibrillation      aspirin (ECOTRIN) 81 MG EC tablet Take 81 mg by mouth once daily.      fluticasone propionate (FLONASE) 50 mcg/actuation nasal spray 1 spray (50 mcg total) by Each Nostril route once daily.  Qty: 1 Bottle, Refills: 0      !! HYDROcodone-acetaminophen (NORCO) 7.5-325 mg per tablet Take 1 tablet by mouth every 6 (six) hours as needed.  Qty: 28 tablet, Refills: 0     Comments: Dexter bedside delivery 10/14/20.  Quantity prescribed more than 7 day supply? No      nitroGLYCERIN (NITROSTAT) 0.4 MG SL tablet Place 1 tablet (0.4 mg total) under the tongue every 5 (five) minutes as needed for Chest pain.  Qty: 25 tablet, Refills: 11      omeprazole (PRILOSEC) 20 MG capsule Take 1 capsule (20 mg total) by mouth daily as needed.  Qty: 90 capsule, Refills: 0    Associated Diagnoses: Gastroesophageal reflux disease without esophagitis      triamcinolone acetonide 0.1% (KENALOG) 0.1 % cream Apply topically 2 (two) times daily. Apply to affected area twice daily as directed.  Qty: 453.6 g, Refills: 0    Associated Diagnoses: Venous stasis dermatitis of both lower extremities       !! - Potential duplicate medications found. Please discuss with provider.          Discharge Procedure Orders (must include Diet, Follow-up, Activity):   Discharge Procedure Orders (must include Diet, Follow-up, Activity)   Diet general     Call MD for:  temperature >100.4     Call MD for:  difficulty breathing, headache or visual disturbances     Call MD for:  redness, tenderness, or signs of infection (pain, swelling, redness, odor or green/yellow discharge around incision site)     Call MD for:  severe uncontrolled pain     No dressing needed     Activity as tolerated

## 2020-11-30 NOTE — PROGRESS NOTES
PROCEDURE: Mohs' Micrographic Surgery    INDICATION: Location in mask areas of face including central face, nose, eyelids, eyebrows, lips, chin, preauricular, temple, and ear.    REFERRING PROVIDER: Bianca Kumar M.D.    CASE NUMBER:     ANESTHETIC: 5 cc 0.5% Lidocaine with Epi 1:200,000 mixed 1:1 with 0.5% Bupivacaine    SURGICAL PREP: Ophthalmic Betadine    SURGEON: Hermelinda Mcclure MD    ASSISTANTS: Neeraj Samuels    PREOPERATIVE DIAGNOSIS: basal cell carcinoma    POSTOPERATIVE DIAGNOSIS: basal cell carcinoma    PATHOLOGIC DIAGNOSIS: basal cell carcinoma    HISTOLOGY OF SPECIMENS IN FIRST STAGE:   Tumor Type: Tumor seen. Superficial basal cell carcinoma: Foci of basaloid cells with peripheral palisading and focal retraction artifact arising along the dermoepidermal junction and extending into the papillary dermis.  Nodular basal cell carcinoma: Nodular tumor in dermis composed of basaloid cells exhibiting peripheral palisading and retraction artifact.   Depth of Invasion: epidermis and dermis      HISTOLOGY OF SPECIMENS IN SUBSEQUENT STAGES:  Tumor Type: Tumor seen. Same as in first stage.   Depth of Invasion: epidermis and dermis  Perineural Invasion: No.    STAGES OF MOHS' SURGERY PERFORMED: 4    TUMOR-FREE PLANE ACHIEVED: Yes    HEMOSTASIS: electrocoagulation     SPECIMENS: 6 (2 in stage A, 2 in stage B, 1 in stage C and 1 in stage D)    LOCATION: left medial upper eyelid canthus.  Patient verified location in mirror, and also verified with photo in Epic.    INITIAL LESION SIZE: 0.4 x 0.4 cm    FINAL DEFECT SIZE: 1.0 x 1.4 cm    WOUND REPAIR/DISPOSITION: The patient tolerated Mohs' Micrographic Surgery for a basal cell carcinoma very well. When the tumor was completely removed, the patient was referred to Dr Cooper in [facility] for repair later today.

## 2020-12-01 NOTE — PLAN OF CARE
Patient and wife state they are ready to be discharged. Instructions and prescriptions (delivered to bedside) given to patient and wife. Both verbalize understanding. Patient tolerating po liquids with no difficulty. Patient states pain is at a tolerable level for them. Anesthesia consent and surgical consent in chart upon patient's discharge from [facility].

## 2020-12-01 NOTE — TRANSFER OF CARE
"Anesthesia Transfer of Care Note    Patient: Janusz Montgomery Jr.    Procedure(s) Performed: Procedure(s) (LRB):  RECONSTRUCTION-SOFT TISSUE (Left)  PROBING, NASOLACRIMAL DUCT, (Left)  ADJACENT TISSUE TRANSFER x2  (Left)    Patient location: [facility]    Anesthesia Type: general    Transport from OR: Transported from OR on 6-10 L/min O2 by face mask with adequate spontaneous ventilation    Post pain: adequate analgesia    Post assessment: no apparent anesthetic complications and tolerated procedure well    Post vital signs: stable    Level of consciousness: awake, alert and oriented    Nausea/Vomiting: no nausea/vomiting    Complications: none    Transfer of care protocol was followed      Last vitals: 11/30/2020  Visit Vitals  /52   Pulse 80   Temp 97.9   Resp 20   Ht 6' 1" (1.854 m)   Wt (!) 156.5 kg (345 lb)   SpO2 100%   BMI 45.52 kg/m²     "

## 2020-12-01 NOTE — BRIEF OP NOTE
Brief Operative Note  Ophthalmology Service      Date of Procedure: 11/30/2020     Attending Physician: Teresa Cooper MD     Resident Physician: Radha Wallace MD    Pre-Operative Diagnosis: Left medial canthal Mohs eyelid defect due to basal cell carcinoma measuring 1.0 x 1.4 cm    Post-Operative Diagnosis: Same as pre-operative diagnosis    Treatments/Procedures:  1. Closure of MOHS procedure defect   2. Skin advancement flap/blepharoplasty left upper eyelid(66456)   3. Rhomboid/Canthal flap, left (82519)   4. Nasolacrimal duct probing, left eye (65605)    Perioperative Antibiotics: Clindamycin 900mg.     Anesthesia: General and local    Complications: None    Estimated Blood Loss: < 5 cc    Specimens: None    -------------------------------------------------------------  Full dictated Operative Report to follow.

## 2020-12-01 NOTE — OP NOTE
Operative Note  Ophthalmology Service      Date of Procedure: 11/30/2020     Attending Physician: Teresa Cooper MD     Resident Physician: Radha Wallace MD    Pre-Operative Diagnosis: Left medial canthal Mohs eyelid defect due to basal cell carcinoma measuring 1.0 x 1.4 cm    Post-Operative Diagnosis: Same as pre-operative diagnosis    Treatments/Procedures:  1. Closure of MOHS procedure defect   2. Left upper eyelid advancement (91550), [id number]  3. Rhomboid/Canthal flap, left (64436), [id number]  4. Nasolacrimal duct probing, left eye (08649)    Perioperative Antibiotics: Clindamycin 900mg.     Anesthesia: General and local    Complications: None    Estimated Blood Loss: < 5 cc    Specimens: None     Discharge: Home     Indications for surgery: 75 y.o.-year-old male with a history of basal cell carcinoma removed earlier today by Dr. Mcclure by means of Mohs surgery with resulting skin defect of left medial canthus. Patient understands the risk of pain, bleeding, infection, ocular injury, loss of the eye, asymmetry, need for revision in future, scarring.     The patient was brought to the operating room and general anesthesia was induced.  A rhomboid flap and an upper lid flap were marked adjacent to the wound prior to the injection of local anesthesia consisting of 2% lidocaine with epinephrine, 0.5 % Marcaine, and Vitrase. A total of 3.5cc of the above solution was injected. The patient was prepped and draped in the usual sterile manner for oculoplastic surgery. A corneal shield was placed in the Operative palpebral fissure.        The left medial canthal and eyelid defect was measured to be 10mm x 14 mm. The left upper punctum was dilated with a punctal dilator and probed using a 1/2 Sawant probe. A hard stop was met at the lacrimal bone without involvement of the canalicular system.     A corneal shield was placed in the left palpebral fissure. A 15 blade was used to incise the rhomboid flap to the depth of  skin and subcutaneous tissue.  The 15 blade was also used to incise the upper lid flap creating a skin only flap. Straight Hunt scissors were used to undermine the surrounding edges of the flap and the adjacent tissues. Hemostasis was obtained with electrocautery. 5-0 Vicryl on p-2 buried sutures reapproximated the rhomboid flap to the inferomedial and inferolateral edges of the wound. Next, 6-0 vicryl on s-14 buried sutures were used to anchor the upper eyelid flap to the lateral edge of the rhomboid flap.  Deep sutures inferomedially were careful not to dissect the canalicular system. The skin edges of the rhomboid flap medially were apposed with vertical mattress 5-0 prolene sutures. A combination of inturrupted 6-0 plain gut and 6.0 vicryl on S14 suture were used to close the skin in addition to a running 6-0 prolene suture. Tobradex ann was applied to the wounds. The corneal shield was removed.    The patient tolerated the procedure well without any complications.

## 2020-12-01 NOTE — ANESTHESIA POSTPROCEDURE EVALUATION
Anesthesia Post Evaluation    Patient: Janusz Montgomery Jr.    Procedure(s) Performed: Procedure(s) (LRB):  RECONSTRUCTION-SOFT TISSUE (Left)  PROBING, NASOLACRIMAL DUCT, (Left)  ADJACENT TISSUE TRANSFER x2  (Left)    Final Anesthesia Type: general    Patient location during evaluation: PACU  Patient participation: Yes- Able to Participate  Level of consciousness: awake and alert and oriented  Post-procedure vital signs: reviewed and stable  Pain management: adequate  Airway patency: patent    PONV status at discharge: No PONV  Anesthetic complications: no      Cardiovascular status: blood pressure returned to baseline  Respiratory status: unassisted  Hydration status: euvolemic  Follow-up not needed.          Vitals Value Taken Time   /62 11/30/20 1945   Temp 36.4 °C (97.5 °F) 11/30/20 1945   Pulse 80 11/30/20 1945   Resp 20 11/30/20 1945   SpO2 95 % 11/30/20 1945         No case tracking events are documented in the log.      Pain/Serjio Score: Pain Rating Prior to Med Admin: 6 (11/30/2020  7:21 PM)  Pain Rating Post Med Admin: 5 (11/30/2020  7:45 PM)  Serjio Score: 10 (11/30/2020  7:15 PM)

## 2020-12-03 NOTE — PROGRESS NOTES
Subjective:       Patient ID: Janusz Montgomery Jr. is a 75 y.o. white male who presents for follow-up evaluation of   No chief complaint on file.    HPI     Patient seen by me September 2020.  Last seen by Dr. Moctezuma in December 2017; lost to follow up since then. He was followed by Dr. 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 Moctezuma.      Patient presents for f/u of CKD and hospital follow up.  Baseline creatinine of 1.5-1.6 mg/dL since Feb 2020; prior to that, it was 1.01-1.2 mg/dL.  Appears that patient had NAT in Feb 2020 that never returned to baseline. Elevated sCr initially noted on 2/11; patient had cardiac cath on 2/14 and was stented.     Significant hx includes HLD, CAD s angina, HTN, skin ulcer, BCC, sepsis, AKIs, afib    The patient denies taking NSAIDs, recreational drugs, recent episode of dehydration, diarrhea, nausea or vomiting, acute illness, hospitalization or exposure to IV radiocontrast. On Plavix and Eliquis. Says he is often on antibiotics***. Takes melatonin.     Significant family hx includes: alzheimer's, HTN, skin ca, lung ca, PVD. No known renal disease.    Last renal US: 8/2020, reviewed.    Review of Systems   Constitutional: Positive for fatigue. Negative for appetite change and fever.   HENT: Negative for facial swelling.    Respiratory: Positive for shortness of breath (no worse since stopping lasix p hospital d/c).  Negative for cough and wheezing.    Cardiovascular: Positive for leg swelling. Negative for chest pain and palpitations.   Gastrointestinal: Negative for constipation, diarrhea, nausea and vomiting.   Endocrine: Positive for polydipsia and polyuria. Polyphagia.  Genitourinary: Positive for frequency and urgency. Negative for difficulty urinating, dysuria, flank pain and hematuria.   Musculoskeletal: Negative for back pain.   Skin: Positive for wound (to left leg).   Neurological: Dizziness improved.  Psychiatric/Behavioral: Sleep improved.       Objective:       There were no vitals taken for this visit.  Physical Exam  Vitals signs reviewed.   Constitutional:       General: He is not in acute distress.     Appearance: Normal appearance. He is well-developed. He is obese.   Eyes:      Conjunctiva/sclera: Conjunctivae normal.   Neck:      Musculoskeletal: Neck supple.      Vascular: No JVD.   Cardiovascular:      Rate and Rhythm: Normal rate and regular rhythm.      Heart sounds: Normal heart sounds. No murmur. No friction rub. No gallop.    Pulmonary:      Effort: Pulmonary effort is normal. No respiratory distress.      Breath sounds: Normal breath sounds.   Abdominal:      General: Bowel sounds are normal. There is no distension.      Palpations: Abdomen is soft.      Tenderness: There is no abdominal tenderness. There is no right CVA tenderness or left CVA tenderness.   Musculoskeletal:         General: Swelling (trace to right leg; left leg wrapped) present.   Skin:    Redness to RLE and scabs from scratching; left leg wrapped.    Neurological:      Mental Status: He is alert and oriented to person, place, and time.   Psychiatric:         Mood and Affect: Mood normal.         Behavior: Behavior normal.         Thought Content: Thought content normal.         Judgment: Judgment normal.           Lab Results   Component Value Date    CREATININE 1.5 (H) 11/23/2020     Prot/Creat Ratio, Urine   Date Value Ref Range Status   11/23/2020 0.13 0.00 - 0.20 Final   08/24/2020 0.12 0.00 - 0.20 Final   03/17/2017 0.15 0.00 - 0.20 Final     Lab Results   Component Value Date     11/23/2020    K 4.5 11/23/2020    CO2 25 11/23/2020     11/23/2020     Lab Results   Component Value Date    .8 (H) 11/23/2020    CALCIUM 8.6 (L) 11/23/2020    PHOS 3.6 11/23/2020     Lab Results   Component Value Date    HGB 11.6 (L) 11/23/2020    WBC 11.86 11/23/2020    HCT 35.2 (L) 11/23/2020      Lab Results   Component Value Date    HGBA1C 5.6 02/11/2020      11/23/2020    BUN 26 (H) 11/23/2020     Lab Results   Component Value Date    LDLCALC 104.0 02/11/2020         Assessment:       No diagnosis found.    Plan:   CKD stage 3B c eGFR 42-45 mL/min - likely 2/2 HTN nephrosclerosis and atherosclerotic disease, and age-related nephron loss. Has progressed this year but cause of progression is unclear.*** NAT last week likely due to ATN from hypotension. sCr trending downward during admission but has not changed since discharge. Educated patient to control BP, BG, remain well-hydrated, and avoid NSAIDs to prevent progression of CKD.    UPCR Non-proteinuric. Olmesartan stopped in setting of NAT and hypotension   Acid-base WNL.   Renal osteodystrophy Ca okay. On vitamin D 1000 daily. Will repeat PTH, vit D for next visit.   Anemia Hgb at goal for CKD.   DM N/a.   Lipid Management Okay on statin per PCP.   ESRD planning Anticipatory guidance provided about timing of dialysis. Start discussions and planning when eGFR is about 20 mL/min; most patients start dialysis between 5-10 mL/min.     HTN - High today, but pt took BP medication on the way to clinic. On metoprolol succinate 25 mg. Lasix and olmesartan stopped recently after hospitalization with hypotension and NAT. Goal is SBP < 140.    Hyperkalemia - Olmesartan already d/c at discharge. Lasix also recently stopped. Hydrate well with water and low K diet. K finder handout provided.    All questions patient had were answered.  Asked if further questions. None. F/u in clinic in 3 mos  with labs and urine prior to next visit or sooner if needed.  ER for emergency concerns.    Summary of Plan:  1. Increase hydration and repeat labs in a week  2. Low potassium diet  3. avoid NSAID/ bactrim/ IV contrast/ gadolinium/ aminoglycoside where possible  4. RTC in 3 mos

## 2020-12-08 ENCOUNTER — PATIENT MESSAGE (OUTPATIENT)
Dept: NEPHROLOGY | Facility: CLINIC | Age: 75
End: 2020-12-08

## 2020-12-08 ENCOUNTER — OFFICE VISIT (OUTPATIENT)
Dept: NEPHROLOGY | Facility: CLINIC | Age: 75
End: 2020-12-08
Payer: MEDICARE

## 2020-12-08 ENCOUNTER — OFFICE VISIT (OUTPATIENT)
Dept: OPHTHALMOLOGY | Facility: CLINIC | Age: 75
End: 2020-12-08
Payer: MEDICARE

## 2020-12-08 ENCOUNTER — TELEPHONE (OUTPATIENT)
Dept: OPHTHALMOLOGY | Facility: CLINIC | Age: 75
End: 2020-12-08

## 2020-12-08 VITALS
HEIGHT: 73 IN | HEART RATE: 70 BPM | DIASTOLIC BLOOD PRESSURE: 78 MMHG | BODY MASS INDEX: 41.75 KG/M2 | WEIGHT: 315 LBS | SYSTOLIC BLOOD PRESSURE: 120 MMHG | OXYGEN SATURATION: 96 %

## 2020-12-08 DIAGNOSIS — D64.9 ANEMIA, UNSPECIFIED TYPE: ICD-10-CM

## 2020-12-08 DIAGNOSIS — C44.111 BASAL CELL CARCINOMA (BCC) OF MEDIAL CANTHUS OF LEFT EYE: ICD-10-CM

## 2020-12-08 DIAGNOSIS — I10 HYPERTENSION, UNSPECIFIED TYPE: ICD-10-CM

## 2020-12-08 DIAGNOSIS — E87.5 HYPERKALEMIA: ICD-10-CM

## 2020-12-08 DIAGNOSIS — E66.01 MORBID OBESITY WITH BODY MASS INDEX OF 45.0-49.9 IN ADULT: ICD-10-CM

## 2020-12-08 DIAGNOSIS — Z98.890 POST-OPERATIVE STATE: Primary | ICD-10-CM

## 2020-12-08 DIAGNOSIS — H02.59 FLOPPY EYELID SYNDROME: ICD-10-CM

## 2020-12-08 DIAGNOSIS — R73.9 PRE-MEAL BLOOD GLUCOSE BETWEEN 12.0 AND 13.9 MMOL/L: ICD-10-CM

## 2020-12-08 DIAGNOSIS — H26.493 POSTERIOR CAPSULAR OPACIFICATION, BILATERAL: ICD-10-CM

## 2020-12-08 DIAGNOSIS — N18.32 STAGE 3B CHRONIC KIDNEY DISEASE: Primary | ICD-10-CM

## 2020-12-08 DIAGNOSIS — Z13.9 SCREENING FOR UNSPECIFIED CONDITION: Primary | ICD-10-CM

## 2020-12-08 DIAGNOSIS — T81.31XA POSTOPERATIVE WOUND DEHISCENCE, INITIAL ENCOUNTER: ICD-10-CM

## 2020-12-08 DIAGNOSIS — E21.3 HYPERPARATHYROIDISM: ICD-10-CM

## 2020-12-08 PROCEDURE — 1126F PR PAIN SEVERITY QUANTIFIED, NO PAIN PRESENT: ICD-10-PCS | Mod: HCNC,S$GLB,, | Performed by: NURSE PRACTITIONER

## 2020-12-08 PROCEDURE — 99999 PR PBB SHADOW E&M-EST. PATIENT-LVL V: CPT | Mod: PBBFAC,HCNC,, | Performed by: NURSE PRACTITIONER

## 2020-12-08 PROCEDURE — 99214 OFFICE O/P EST MOD 30 MIN: CPT | Mod: HCNC,S$GLB,, | Performed by: NURSE PRACTITIONER

## 2020-12-08 PROCEDURE — 1157F ADVNC CARE PLAN IN RCRD: CPT | Mod: HCNC,S$GLB,, | Performed by: OPHTHALMOLOGY

## 2020-12-08 PROCEDURE — 92285 EXTERNAL PHOTOGRAPHY - OU - BOTH EYES: ICD-10-PCS | Mod: HCNC,S$GLB,, | Performed by: OPHTHALMOLOGY

## 2020-12-08 PROCEDURE — 99499 RISK ADDL DX/OHS AUDIT: ICD-10-PCS | Mod: S$GLB,,, | Performed by: NURSE PRACTITIONER

## 2020-12-08 PROCEDURE — 1101F PR PT FALLS ASSESS DOC 0-1 FALLS W/OUT INJ PAST YR: ICD-10-PCS | Mod: HCNC,CPTII,S$GLB, | Performed by: NURSE PRACTITIONER

## 2020-12-08 PROCEDURE — 3288F PR FALLS RISK ASSESSMENT DOCUMENTED: ICD-10-PCS | Mod: HCNC,CPTII,S$GLB, | Performed by: NURSE PRACTITIONER

## 2020-12-08 PROCEDURE — 1157F ADVNC CARE PLAN IN RCRD: CPT | Mod: HCNC,S$GLB,, | Performed by: NURSE PRACTITIONER

## 2020-12-08 PROCEDURE — 3008F BODY MASS INDEX DOCD: CPT | Mod: HCNC,CPTII,S$GLB, | Performed by: NURSE PRACTITIONER

## 2020-12-08 PROCEDURE — 1159F PR MEDICATION LIST DOCUMENTED IN MEDICAL RECORD: ICD-10-PCS | Mod: HCNC,S$GLB,, | Performed by: NURSE PRACTITIONER

## 2020-12-08 PROCEDURE — 1101F PT FALLS ASSESS-DOCD LE1/YR: CPT | Mod: HCNC,CPTII,S$GLB, | Performed by: NURSE PRACTITIONER

## 2020-12-08 PROCEDURE — 1126F AMNT PAIN NOTED NONE PRSNT: CPT | Mod: HCNC,S$GLB,, | Performed by: NURSE PRACTITIONER

## 2020-12-08 PROCEDURE — 99024 POSTOP FOLLOW-UP VISIT: CPT | Mod: HCNC,S$GLB,, | Performed by: OPHTHALMOLOGY

## 2020-12-08 PROCEDURE — 3288F FALL RISK ASSESSMENT DOCD: CPT | Mod: HCNC,CPTII,S$GLB, | Performed by: NURSE PRACTITIONER

## 2020-12-08 PROCEDURE — 3008F PR BODY MASS INDEX (BMI) DOCUMENTED: ICD-10-PCS | Mod: HCNC,CPTII,S$GLB, | Performed by: NURSE PRACTITIONER

## 2020-12-08 PROCEDURE — 99999 PR PBB SHADOW E&M-EST. PATIENT-LVL III: ICD-10-PCS | Mod: PBBFAC,HCNC,, | Performed by: OPHTHALMOLOGY

## 2020-12-08 PROCEDURE — 1159F MED LIST DOCD IN RCRD: CPT | Mod: HCNC,S$GLB,, | Performed by: NURSE PRACTITIONER

## 2020-12-08 PROCEDURE — 92285 EXTERNAL OCULAR PHOTOGRAPHY: CPT | Mod: HCNC,S$GLB,, | Performed by: OPHTHALMOLOGY

## 2020-12-08 PROCEDURE — 3074F PR MOST RECENT SYSTOLIC BLOOD PRESSURE < 130 MM HG: ICD-10-PCS | Mod: HCNC,CPTII,S$GLB, | Performed by: NURSE PRACTITIONER

## 2020-12-08 PROCEDURE — 1157F PR ADVANCE CARE PLAN OR EQUIV PRESENT IN MEDICAL RECORD: ICD-10-PCS | Mod: HCNC,S$GLB,, | Performed by: OPHTHALMOLOGY

## 2020-12-08 PROCEDURE — 3074F SYST BP LT 130 MM HG: CPT | Mod: HCNC,CPTII,S$GLB, | Performed by: NURSE PRACTITIONER

## 2020-12-08 PROCEDURE — 99499 UNLISTED E&M SERVICE: CPT | Mod: S$GLB,,, | Performed by: NURSE PRACTITIONER

## 2020-12-08 PROCEDURE — 99999 PR PBB SHADOW E&M-EST. PATIENT-LVL V: ICD-10-PCS | Mod: PBBFAC,HCNC,, | Performed by: NURSE PRACTITIONER

## 2020-12-08 PROCEDURE — 1157F PR ADVANCE CARE PLAN OR EQUIV PRESENT IN MEDICAL RECORD: ICD-10-PCS | Mod: HCNC,S$GLB,, | Performed by: NURSE PRACTITIONER

## 2020-12-08 PROCEDURE — 99024 PR POST-OP FOLLOW-UP VISIT: ICD-10-PCS | Mod: HCNC,S$GLB,, | Performed by: OPHTHALMOLOGY

## 2020-12-08 PROCEDURE — 3078F PR MOST RECENT DIASTOLIC BLOOD PRESSURE < 80 MM HG: ICD-10-PCS | Mod: HCNC,CPTII,S$GLB, | Performed by: NURSE PRACTITIONER

## 2020-12-08 PROCEDURE — 3078F DIAST BP <80 MM HG: CPT | Mod: HCNC,CPTII,S$GLB, | Performed by: NURSE PRACTITIONER

## 2020-12-08 PROCEDURE — 99214 PR OFFICE/OUTPT VISIT, EST, LEVL IV, 30-39 MIN: ICD-10-PCS | Mod: HCNC,S$GLB,, | Performed by: NURSE PRACTITIONER

## 2020-12-08 PROCEDURE — 99999 PR PBB SHADOW E&M-EST. PATIENT-LVL III: CPT | Mod: PBBFAC,HCNC,, | Performed by: OPHTHALMOLOGY

## 2020-12-08 NOTE — PATIENT INSTRUCTIONS
Take home BPs twice a week x 1 week and send the results into me via portal.  Start taking vitamin D3 2000 daily.  Continue low sodium diet.  Avoid NSAID (ibuprofen, advil, motrin, aleve, naprosyn, naproxen, Stan[place], Goody's Powder). Tylenol is okay.  Avoid bactrim/ IV contrast/ gadolinium/ aminoglycoside where possible.  Avoid herbal supplements.  Stay hydrated.  Return to clinic in 3 months with labs (bloodwork and urine) or sooner if needed.  Go to the ER with any emergency concerns.      Chronic Kidney Disease (CKD)     The role of the kidneys is to remove waste products and extra water from the blood.  When the kidneys do not work as they should, waste products begin to build up in the blood. This is called chronic kidney disease (CKD). CKD means that you have kidney damage or a decrease in kidney function lasting at least 3 months. CKD allows extra water, waste, and toxins to build up in the body. This can eventually become life-threatening. You might need dialysis or a kidney transplant to stay alive. This most severe form is called end stage renal disease.  Diabetes is the leading causes of chronic renal failure. Other causes include high blood pressure, hardening of the arteries (atherosclerosis), lupus, inflammation of the blood vessels (vasculitis), and past viral or bacterial infections. Certain over-the-counter pain medicines can cause renal failure when taken often over a long period of time. These include aspirin, ibuprofen, and related anti-inflammatory medicines called NSAIDs (nonsteroidal anti-inflammatory drugs).  Home care  The following guidelines will help you care for yourself at home:  · If you have diabetes, talk with your healthcare provider about keeping your blood sugar under control. Ask if you need to make and changes to your diet, lifestyle, or medicines.  · If you have high blood pressure:  ¨ Take prescribed medicine to lower your blood pressure to the recommended goal of less than  130/80.  ¨ Start a regular exercise program that you enjoy. Check with your healthcare provider to be sure your planned exercise program is right for you.  ¨ Eat less salt (sodium). Your healthcare provider can tell you how much salt per day is safe for you.  · If you are overweight, talk with your healthcare provider about a weight loss plan.  · If you smoke, you must quit. Smoking makes kidney disease worse. Talk with your healthcare provider about ways to help you quit.  For more information, visit the following links:  ¨ www.smokefree.gov/sites/default/files/pdf/clearing-the-air-accessible.pdf  ¨ www.smokefree.gov  ¨ www.cancer.org/healthy/stayawayfromtobacco/guidetoquittingsmoking/  · Most people with CKD need to follow a special diet.  Be sure you understand yours. In general, you will need to limit protein, salt, potassium, and phosphorus. You also need to limit how much fluid you drink.   · CKD is a risk factor for heart disease. Talk with your healthcare provider about any other risk factors you might have and what you can do to lessen them.  · Talk with your healthcare provider about any medicines you are taking to find out if they need to be reduced or stopped.  · Don't use the following over-the-counter medicines, or consult your healthcare provider before using:  ¨ Aspirin and NSAIDs such as ibuprofen or naproxen. Using acetaminophen for fever or pain is OK.  ¨ Laxatives and antacids containing magnesium or aluminum  ¨ Fleet or phospho soda enemas containing phosphorus  ¨ Certain stomach acid-blocking medicine such as cimetidine or ranitidine   ¨ Decongestants containing pseudoephedrine   ¨ Herbal supplements  Follow-up care  Follow up with your healthcare provider, or as advised. Contact one of the following for more information:  · American Association of Kidney Patients 132-072-8955 www.aakp.org  · National Kidney Foundation 241-104-6368 www.kidney.org  · American Kidney Fund 823-125-1796  www.kidneyfund.org  · National Kidney Disease Education Program 866-4KIDNEY www.nkdep.nih.gov  If an X-ray, ECG (cardiogram), or other diagnostic test was taken, you will be told of any new findings that may affect your care.  Call 911  Call 911 if you have any of the following:  · Severe weakness, dizziness, fainting, drowsiness, or confusion  · Chest pain or shortness of breath  · Heart beating fast, slow, or irregularly  When to seek medical advice  Call your healthcare provider right away if any of these occur:  · Nausea or vomiting  · Fever of 100.4°F (38°C) or higher, or as directed by your healthcare provider  · Unexpected weight gain or swelling in the legs, ankles, or around the eyes  · Decrease or absent urine output  Date Last Reviewed: 9/1/2016  © 3982-9884 [employer]. [address] [place] [address], [place] [address]. All rights reserved. This information is not intended as a substitute for professional medical care. Always follow your healthcare professional's instructions.

## 2020-12-08 NOTE — PROGRESS NOTES
"Subjective:       Patient ID: Janusz Montgomery Jr. is a 75 y.o. white male who presents for follow-up evaluation of   No chief complaint on file.      HPI        Patient seen by me September 2020.  Last seen by Dr. Moctezuma in December 2017; lost to follow up since then. He was followed by Dr. 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 Moctezuma.       Patient presents for f/u of CKD and hospital follow up.  Baseline creatinine of 1.5-1.6 mg/dL since Feb 2020; prior to that, it was 1.01-1.2 mg/dL.  Appears that patient had NAT in Feb 2020 that never returned to baseline. Elevated sCr initially noted on 2/11; patient had cardiac cath on 2/14 and was stented.      Home BPs: elevated at home before meds     Significant hx includes HLD, CAD s angina, HTN, skin ulcer, BCC, sepsis, AKIs, afib     The patient denies taking NSAIDs, recreational drugs, recent episode of dehydration, diarrhea, nausea or vomiting, acute illness, hospitalization or exposure to IV radiocontrast. On Plavix and Eliquis. Takes melatonin.      Significant family hx includes: alzheimer's, HTN, skin ca, lung ca, PVD. No known renal disease.     Last renal US: 8/2020, reviewed.    Review of Systems   Respiratory: Positive for shortness of breath (on exertion).    Cardiovascular: Positive for leg swelling. Negative for chest pain and palpitations.   Gastrointestinal: Negative for diarrhea, nausea and vomiting.   Genitourinary: Positive for frequency. Negative for dysuria, flank pain, hematuria and urgency.   Neurological: Positive for dizziness (with postural changes occassionally).   Psychiatric/Behavioral: Positive for sleep disturbance (flipped circardian rhythm).       Objective:       Blood pressure 120/78, pulse 70, height 6' 1" (1.854 m), weight (!) 156.3 kg (344 lb 9.3 oz), SpO2 96 %.  Physical Exam  Constitutional:       Appearance: He is obese.   HENT:      Head:      Comments: Bruising around right eye p skin cancer " removal  Cardiovascular:      Rate and Rhythm: Normal rate and regular rhythm.      Heart sounds: No murmur. No gallop.    Pulmonary:      Effort: Pulmonary effort is normal. No respiratory distress.      Breath sounds: Normal breath sounds. No wheezing or rales.   Abdominal:      General: Bowel sounds are normal.      Tenderness: There is no right CVA tenderness or left CVA tenderness.   Musculoskeletal:      Right lower leg: Edema (trace) present.      Left lower leg: Edema (trace) present.   Psychiatric:         Mood and Affect: Mood normal.         Behavior: Behavior normal.         Thought Content: Thought content normal.         Judgment: Judgment normal.           Lab Results   Component Value Date    CREATININE 1.5 (H) 11/23/2020     Prot/Creat Ratio, Urine   Date Value Ref Range Status   11/23/2020 0.13 0.00 - 0.20 Final   08/24/2020 0.12 0.00 - 0.20 Final   03/17/2017 0.15 0.00 - 0.20 Final     Lab Results   Component Value Date     11/23/2020    K 4.5 11/23/2020    CO2 25 11/23/2020     11/23/2020     Lab Results   Component Value Date    .8 (H) 11/23/2020    CALCIUM 8.6 (L) 11/23/2020    PHOS 3.6 11/23/2020     Lab Results   Component Value Date    HGB 11.6 (L) 11/23/2020    WBC 11.86 11/23/2020    HCT 35.2 (L) 11/23/2020      Lab Results   Component Value Date    HGBA1C 5.6 02/11/2020     11/23/2020    BUN 26 (H) 11/23/2020     Lab Results   Component Value Date    LDLCALC 104.0 02/11/2020         Assessment:       1. Stage 3b chronic kidney disease    2. Hypertension, unspecified type    3. Hyperkalemia    4. Anemia, unspecified type    5. Hyperparathyroidism    6. Morbid obesity with body mass index of 45.0-49.9 in adult        Plan:   CKD stage 3B c eGFR 42-45 mL/min - likely 2/2 HTN nephrosclerosis and atherosclerotic disease, and age-related nephron loss. Has progressed this year but cause of progression is unclear; still at same baseline sCr since Feb. 2020.  Educated  patient to control BP, BG, remain well-hydrated, and avoid NSAIDs to prevent progression of CKD. Encouraged weight loss.     UPCR Non-proteinuric. Olmesartan stopped in setting of NAT and hypotension   Acid-base WNL.   Renal osteodystrophy Ca okay. PTH elevated. Increase vit D to 2000 daily. Will repeat PTH, vit D for next visit.   Anemia Hgb at goal for CKD.   DM N/a.   Lipid Management Okay on statin per PCP.   ESRD planning Anticipatory guidance provided about timing of dialysis. Start discussions and planning when eGFR is about 20 mL/min; most patients start dialysis between 5-10 mL/min.      HTN - WNL today but has been higher at home. On metoprolol succinate 25 mg.   - Lasix and olmesartan stopped recently after hospitalization with hypotension and NAT in Fall 2020  - continue low salt  - Monitor home BP BID x 1 week and send in; may be getting higher readings as BP medication wears off; has had extremely labile BPs in the past so will need careful titration     Hyperkalemia - WNL; continue low K diet     All questions patient had were answered.  Asked if further questions. None. F/u in clinic in 3 mos  with labs and urine prior to next visit or sooner if needed.  ER for emergency concerns.     Summary of Plan:  1. Take vitamin D3 2000 daily  2. Take home BPs.  3. avoid NSAID/ bactrim/ IV contrast/ gadolinium/ aminoglycoside where possible  4. RTC in 3 mos c CBC, RFP, UA, UPCR, PTH, VIt D

## 2020-12-09 ENCOUNTER — PATIENT MESSAGE (OUTPATIENT)
Dept: FAMILY MEDICINE | Facility: CLINIC | Age: 75
End: 2020-12-09

## 2020-12-09 ENCOUNTER — OFFICE VISIT (OUTPATIENT)
Dept: VASCULAR SURGERY | Facility: CLINIC | Age: 75
End: 2020-12-09
Payer: MEDICARE

## 2020-12-09 ENCOUNTER — PATIENT MESSAGE (OUTPATIENT)
Dept: ADMINISTRATIVE | Facility: OTHER | Age: 75
End: 2020-12-09

## 2020-12-09 ENCOUNTER — PATIENT MESSAGE (OUTPATIENT)
Dept: OPHTHALMOLOGY | Facility: CLINIC | Age: 75
End: 2020-12-09

## 2020-12-09 ENCOUNTER — PATIENT MESSAGE (OUTPATIENT)
Dept: CARDIOLOGY | Facility: CLINIC | Age: 75
End: 2020-12-09

## 2020-12-09 VITALS
DIASTOLIC BLOOD PRESSURE: 70 MMHG | WEIGHT: 315 LBS | HEIGHT: 73 IN | SYSTOLIC BLOOD PRESSURE: 122 MMHG | BODY MASS INDEX: 41.75 KG/M2

## 2020-12-09 DIAGNOSIS — I73.9 ASYMPTOMATIC PVD (PERIPHERAL VASCULAR DISEASE): ICD-10-CM

## 2020-12-09 DIAGNOSIS — I65.23 CAROTID STENOSIS, ASYMPTOMATIC, BILATERAL: ICD-10-CM

## 2020-12-09 DIAGNOSIS — I87.303 VENOUS HYPERTENSION OF LOWER EXTREMITY, BILATERAL: Primary | ICD-10-CM

## 2020-12-09 PROCEDURE — 1101F PT FALLS ASSESS-DOCD LE1/YR: CPT | Mod: HCNC,CPTII,S$GLB, | Performed by: SURGERY

## 2020-12-09 PROCEDURE — 99214 OFFICE O/P EST MOD 30 MIN: CPT | Mod: HCNC,S$GLB,, | Performed by: SURGERY

## 2020-12-09 PROCEDURE — 3074F PR MOST RECENT SYSTOLIC BLOOD PRESSURE < 130 MM HG: ICD-10-PCS | Mod: HCNC,CPTII,S$GLB, | Performed by: SURGERY

## 2020-12-09 PROCEDURE — 3008F BODY MASS INDEX DOCD: CPT | Mod: HCNC,CPTII,S$GLB, | Performed by: SURGERY

## 2020-12-09 PROCEDURE — 3288F FALL RISK ASSESSMENT DOCD: CPT | Mod: HCNC,CPTII,S$GLB, | Performed by: SURGERY

## 2020-12-09 PROCEDURE — 99999 PR PBB SHADOW E&M-EST. PATIENT-LVL IV: CPT | Mod: PBBFAC,HCNC,, | Performed by: SURGERY

## 2020-12-09 PROCEDURE — 99214 PR OFFICE/OUTPT VISIT, EST, LEVL IV, 30-39 MIN: ICD-10-PCS | Mod: HCNC,S$GLB,, | Performed by: SURGERY

## 2020-12-09 PROCEDURE — 3008F PR BODY MASS INDEX (BMI) DOCUMENTED: ICD-10-PCS | Mod: HCNC,CPTII,S$GLB, | Performed by: SURGERY

## 2020-12-09 PROCEDURE — 3288F PR FALLS RISK ASSESSMENT DOCUMENTED: ICD-10-PCS | Mod: HCNC,CPTII,S$GLB, | Performed by: SURGERY

## 2020-12-09 PROCEDURE — 3078F DIAST BP <80 MM HG: CPT | Mod: HCNC,CPTII,S$GLB, | Performed by: SURGERY

## 2020-12-09 PROCEDURE — 1159F MED LIST DOCD IN RCRD: CPT | Mod: HCNC,S$GLB,, | Performed by: SURGERY

## 2020-12-09 PROCEDURE — 1101F PR PT FALLS ASSESS DOC 0-1 FALLS W/OUT INJ PAST YR: ICD-10-PCS | Mod: HCNC,CPTII,S$GLB, | Performed by: SURGERY

## 2020-12-09 PROCEDURE — 1126F AMNT PAIN NOTED NONE PRSNT: CPT | Mod: HCNC,S$GLB,, | Performed by: SURGERY

## 2020-12-09 PROCEDURE — 3078F PR MOST RECENT DIASTOLIC BLOOD PRESSURE < 80 MM HG: ICD-10-PCS | Mod: HCNC,CPTII,S$GLB, | Performed by: SURGERY

## 2020-12-09 PROCEDURE — 1126F PR PAIN SEVERITY QUANTIFIED, NO PAIN PRESENT: ICD-10-PCS | Mod: HCNC,S$GLB,, | Performed by: SURGERY

## 2020-12-09 PROCEDURE — 1157F ADVNC CARE PLAN IN RCRD: CPT | Mod: HCNC,S$GLB,, | Performed by: SURGERY

## 2020-12-09 PROCEDURE — 1159F PR MEDICATION LIST DOCUMENTED IN MEDICAL RECORD: ICD-10-PCS | Mod: HCNC,S$GLB,, | Performed by: SURGERY

## 2020-12-09 PROCEDURE — 99999 PR PBB SHADOW E&M-EST. PATIENT-LVL IV: ICD-10-PCS | Mod: PBBFAC,HCNC,, | Performed by: SURGERY

## 2020-12-09 PROCEDURE — 1157F PR ADVANCE CARE PLAN OR EQUIV PRESENT IN MEDICAL RECORD: ICD-10-PCS | Mod: HCNC,S$GLB,, | Performed by: SURGERY

## 2020-12-09 PROCEDURE — 3074F SYST BP LT 130 MM HG: CPT | Mod: HCNC,CPTII,S$GLB, | Performed by: SURGERY

## 2020-12-09 NOTE — LETTER
December 9, 2020        Miguelina Weathers MD  [address] [facility]  [place] [address]             [facility]  [address]  [facility] [place] [place] [address]  Phone: [phone]  Fax: [phone]   Patient: Janusz Montgomery Jr.   MR Number: [id number]   YOB: 1945   Date of Visit: 12/9/2020       Dear Dr. Weathers:    Thank you for referring Janusz Montgomery to me for evaluation. Below are the relevant portions of my assessment and plan of care.            If you have questions, please do not hesitate to call me. I look forward to following Janusz along with you.    Sincerely,      Sukhi Chaudhry MD           CC  No Recipients

## 2020-12-09 NOTE — PATIENT INSTRUCTIONS
Putting on Compression Stockings     Turn the stocking inside-out, then fit it over your toes and heel.          Roll the stocking up your leg.            Once stockings are on, make sure the top of the stocking is about two fingers width below the crease of the knee (or the groin if you wear thigh-high stockings).          Use equipment, such as a stocking guillermo, or wear rubber gloves to make it easier to put on compression stockings.         Elastic compression stockings are prescribed to treat many vein problems. Wearing them may be the most important thing you do to manage your symptoms. The stockings fit tightly around your ankle, gradually reducing in pressure as they go up your legs. This helps keep blood flowing to your heart. As a result, swelling is reduced. Your healthcare provider will prescribe stockings at a safe pressure for you. He or she will also tell you how often to wear and remove the stockings. Follow these instructions closely. Also, do not buy or wear compression stockings without first seeing your healthcare provider.  Tips for wear and care  To wear stockings safely and to get the most benefit:  · Wear the length prescribed by your healthcare provider.  · Pull them to the designated height and no farther. Dont let them bunch at the top. This can restrict blood flow and increase swelling.  · Wear the stockings for the amount of time your healthcare provider recommends. Replace them when they start to feel loose. This will likely be every 3 to 6 months.  · Remove them as your healthcare provider directs. When removed, wash your legs. Then check your legs and feet for sores. Call your healthcare provider if you find a sore. Dont put the stockings back on unless your healthcare provider directs.   · Wash the stockings as instructed. They may need to be hand-washed.  Date Last Reviewed: 5/1/2016  © 0218-1625 [employer] [address] [place] [address], [place] [address]. All rights  reserved. This information is not intended as a substitute for professional medical care. Always follow your healthcare professional's instructions.        Tips for Using Less Salt    Most people with heart problems need to eat less salt (sodium). Reducing the amount of salt you eat may help control your blood pressure. The higher your blood pressure, the greater your risk for heart disease, stroke, blindness, and kidney problems.  At the store  · Make low-salt choices by reading labels carefully. Look for the total amount of sodium per serving.  · Use more fresh food. Buy more fruits and vegetables. Select lean meats, fish, and poultry.  · Use fewer frozen, canned, and packaged foods which often contain a lot of sodium.  · Use plain frozen vegetables without sauces or toppings. These products are often low- or no-sodium.  · Opt for reduced-sodium or no-salt-added versions of canned vegetables and soups.  In the kitchen  · Don't add salt to food when you're cooking. Season with flavorings such as onion, garlic, pepper, salt-free herbal blends, and lemon or lime juice.  · Use a cookbook containing low-salt recipes. It can give you ideas for tasty meals that are healthy for your heart.  · Sprinkle salt-free herbal blends on vegetables and meat.  · Drain and rinse canned foods, such as canned beans and vegetables, before cooking or eating.  Eating out  · Tell the  you're on a low-salt diet. Ask questions about the menu.  · Order fish, chicken, and meat broiled, baked, poached, or grilled without salt, butter, or breading.  · Use lemon, pepper, and salt-free herb mixes to add flavor.  · Choose plain steamed rice, boiled noodles, and baked or boiled potatoes. Top potatoes with chives and a little sour cream.     Beware! Salt goes by many other names. Limit foods with these words listed as ingredients: salt, sodium, soy sauce, baking soda, baking powder, MSG, monosodium, Na (the chemical symbol for sodium). Some  antacids are also high in salt.   Date Last Reviewed: 6/19/2015  © 7371-2355 [employer]. [address] [place] [address], [place] [address]. All rights reserved. This information is not intended as a substitute for professional medical care. Always follow your healthcare professional's instructions.        Low-Salt Diet  This diet removes foods that are high in salt. It also limits the amount of salt you use when cooking. It is most often used for people with high blood pressure, edema (fluid retention), and kidney, liver, or heart disease.  Table salt contains the mineral sodium. Your body needs sodium to work normally. But too much sodium can make your health problems worse. Your healthcare provider is recommending a low-salt (also called low-sodium) diet for you. Your total daily allowance of salt is 1,500 to 2,300 milligrams (mg). It is less than 1 teaspoon of table salt. This means you can have only about 500 to 700 mg of sodium at each meal. People with certain health problems should limit salt intake to the lower end of the recommended range.    When you cook, dont add much salt. If you can cook without using salt, even better. Dont add salt to your food at the table.  When shopping, read food labels. Salt is often called sodium on the label. Choose foods that are salt-free, low salt, or very low salt. Note that foods with reduced salt may not lower your salt intake enough.    Beans, potatoes, and pasta  Ok: Dry beans, split peas, lentils, potatoes, rice, macaroni, pasta, spaghetti without added salt  Avoid: Potato chips, tortilla chips, and similar products  Breads and cereals  Ok: Low-sodium breads, rolls, cereals, and cakes; low-salt crackers, matzo crackers  Avoid: Salted crackers, pretzels, popcorn, Nauruan toast, pancakes, muffins  Dairy  Ok: Milk, chocolate milk, hot chocolate mix, low-salt cheeses, and yogurt  Avoid: Processed cheese and cheese spreads; Roquefort, Camembert, and cottage cheese;  buttermilk, instant breakfast drink  Desserts  Ok: Ice cream, frozen yogurt, juice bars, gelatin, cookies and pies, sugar, honey, jelly, hard candy  Avoid: Most pies, cakes and cookies prepared or processed with salt; instant pudding  Drinks  Ok: Tea, coffee, fizzy (carbonated) drinks, juices  Avoid: Flavored coffees, electrolyte replacement drinks, sports drinks  Meats  Ok: All fresh meat, fish, poultry, low-salt tuna, eggs, egg substitute  Avoid: Smoked, pickled, brine-cured, or salted meats and fish. This includes gillis, chipped beef, corned beef, hot dogs, deli meats, ham, kosher meats, salt pork, sausage, canned tuna, salted codfish, smoked salmon, herring, sardines, or anchovies.  Seasonings and spices  Ok: Most seasonings are okay. Good substitutes for salt include: fresh herb blends, hot sauce, lemon, garlic, contreras, vinegar, dry mustard, parsley, cilantro, horseradish, tomato paste, regular margarine, mayonnaise, unsalted butter, cream cheese, vegetable oil, cream, low-salt salad dressing and gravy.  Avoid: Regular ketchup, relishes, pickles, soy sauce, teriyaki sauce, Worcestershire sauce, BBQ sauce, tartar sauce, meat tenderizer, chili sauce, regular gravy, regular salad dressing, salted butter  Soups  Ok: Low-salt soups and broths made with allowed foods  Avoid: Bouillon cubes, soups with smoked or salted meats, regular soup and broth  Vegetables  Ok: Most vegetables are okay; also low-salt tomato and vegetable juices  Avoid: Sauerkraut and other brine-soaked vegetables; pickles and other pickled vegetables; tomato juice, olives  Date Last Reviewed: 8/1/2016  © 1118-5821 [employer]. [address] [place] [address]. All rights reserved. This information is not intended as a substitute for professional medical care. Always follow your healthcare professional's instructions.        Low-Salt Choices  Eating salt (sodium) can make your body retain too much water. Excess water makes  your heart work harder. Canned, packaged, and frozen foods are easy to prepare, but they are often high in sodium. Here are some ideas for low-salt foods you can easily prepare yourself.    For breakfast  · Fruit or 100% fruit juice  · Whole-wheat bread or an English muffin. Compare sodium content on labels.  · Low-fat milk or yogurt  · Unsalted eggs  · Shredded wheat  · Corn tortillas  · Unsalted steamed rice  · Regular (not instant) hot cereal, made without salt  Stay away from:  · Sausage, gillis, and ham  · Flour tortillas  · Packaged muffins, pancakes, and biscuits  · Instant hot cereals  · Cottage cheese  For lunch and dinner  · Fresh fish, chicken, turkey, or meat--baked, broiled, or roasted without salt  · Dry beans, cooked without salt  · Tofu, stir-fried without salt  · Unsalted fresh fruit and vegetables, or frozen or canned fruit and vegetables with no added salt  Stay away from:  · Lunch or deli meat that is cured or smoked  · Cheese  · Tomato juice and catsup  · Canned vegetables, soups, and fish not labeled as no-salt-added or reduced sodium  · Packaged gravies and sauces  · Olives, pickles, and relish  · Bottled salad dressings  For snacks and desserts  · Yogurt  · Unsalted, air popped popcorn  · Unsalted nuts or seeds  Stay away from:  · Pies and cakes  · Packaged dessert mixes  · Pizza  · Canned and packaged puddings  · Pretzels, chips, crackers, and nuts--unless the label says unsalted  Date Last Reviewed: 6/17/2015  © 5833-1076 "[employer]. [address] [place] [address], [place] [address]. All rights reserved. This information is not intended as a substitute for professional medical care. Always follow your healthcare professional's instructions.          Carotid Artery Problems: Blockage     A healthy carotid artery lets blood flow easily to the brain.   The blood carries oxygen and nutrients throughout the body. The carotid arteries are large blood vessels that carry blood to the brain.  Certain health problems can make the inside of the carotid arteries narrow and rough. Over time, this damage increases the chances of having a stroke. A stroke is a sudden loss of brain function.   Open carotid arteries  In a healthy carotid artery, the inside of the artery is open. The lining of the artery wall is also smooth. This lets blood flow freely from the heart to the brain. The brain gets all the oxygen and nutrients it needs to function well.  Narrowed carotid arteries  Health factors, such as high blood pressure, high cholesterol, smoking, and diabetes, can damage artery walls and make them rough. This allows cholesterol and other particles in the blood to stick to the artery walls and form plaque or fatty deposits. As the plaque builds up, it can narrow the artery. Blood may also collect on the plaque and form blood clots.  How a stroke happens     Emboli can enter the bloodstream and travel to the brain. Brain tissue is damaged when emboli block arteries in the brain.   A stroke can happen when plaque in the carotid artery ruptures. This can allow small pieces of plaque to break off into the bloodstream. At the same time, rupture can make more blood clots. The pieces of plaque and tiny blood clots or emboli can flow in the blood until they get stuck in a small blood vessel in the brain. This blocks blood flow to part of the brain and causes a stroke.  Date Last Reviewed: 6/8/2015  © 4135-0251 [employer]. [address] [place] [id number]. All rights reserved. This information is not intended as a substitute for professional medical care. Always follow your healthcare professional's instructions.

## 2020-12-09 NOTE — PROGRESS NOTES
Sukhi Chaudhry MD VI                       [facility]                         12/09/2020    HPI:  Janusz Montgomery Jr. is a 75 y.o. male with   Patient Active Problem List   Diagnosis    Essential hypertension    Other hyperlipidemia    Coronary artery disease involving native coronary artery of native heart without angina pectoris s/p RCA stent    CHARISMA on CPAP    GERD (gastroesophageal reflux disease)    Benign prostatic hyperplasia with urinary obstruction    Morbid obesity with body mass index of 45.0-49.9 in adult    Prostate cancer    Paroxysmal atrial fibrillation     Major depressive disorder, recurrent episode, mild    Generalized anxiety disorder    History of gout    Herpes simplex keratoconjunctivitis    Decreased range of motion of left ankle    Right leg weakness    Impaired mobility    Balance problems    PVD (peripheral vascular disease)    Bilateral leg edema    Bilateral carotid artery stenosis    Gait abnormality    Left leg weakness    Floppy eyelid syndrome    PURDY (dyspnea on exertion)    Insomnia    Small airways disease    Personal history of asbestosis    CAD (coronary artery disease)    Venous stasis dermatitis    Acute kidney injury superimposed on chronic kidney disease    CKD (chronic kidney disease) stage 3, GFR 30-59 ml/min    Hypotension    History of loop recorder    Trigger thumb, right thumb    Asbestosis    Macrocytic anemia    Autoeczematization    Basal cell carcinoma (BCC) of medial canthus of left eye    being managed by PCP and specialists who is here today for evaluation of PVD.  Presents as referral for mgmt of LLE wound; history of non healing for which bx obtained showing BCC.  Treating with cream due to poor radiation candidate.  Seeing wound care at [facility].  Patient has no complaints of claudication.  Patient states location is L medial lower leg occurring for several months.  Associated signs and symptoms  include discoloration.  Quality is heavy and severity is 6/10.  Symptoms began several months ago.  Alleviating factors include wound care.  Worsening factors include dependency.  no rest pain.  + tissue loss.  Patient is not diabetic.  Is not on Pletal.  No previous lower extremity interventions.    no MI  no Stroke  Tobacco use: no  Daily Aspirin: yes  Anticoagulation: no    10/2020: states wound is healing.      12/2020:  States wound has healed.  C/o LLE edema after eating high Na meal    Past Medical History:   Diagnosis Date    NAT (acute kidney injury) 3/19/2017    ALLERGIC RHINITIS     Anemia     Anxiety     Basal cell carcinoma 10/19/2018    forehead and right medial shoulder    Basal cell carcinoma 01/09/2019    left nasal bridge and left posterior ear    Basal cell carcinoma 06/12/2020    left lower medial leg - Efudex    Chronic rhinitis 5/3/2013    Chronic rhinitis 5/3/2013    Coronary artery disease involving native coronary artery of native heart without angina pectoris s/p RCA stent     Cortical cataract of both eyes 3/18/2016    Delayed sleep phase syndrome 3/13/2019    Depression     Erectile dysfunction 3/24/2014    Erectile dysfunction 3/24/2014    Essential hypertension     GERD (gastroesophageal reflux disease) 7/25/2012    Gout, arthritis     Grade III open fracture of left tibia and fibula s/p ex-fix on 7/1/16 and ORIF of left tibia on 7/15 7/6/2016    H/O: iritis     Helicobacter pylori (H. pylori) infection     Treated    Herpes simplex keratoconjunctivitis 9/30/2015    - on acyclovir - followed by opthalmologyDr. Uribe     Herpes simplex keratoconjunctivitis 9/30/2015    - on acyclovir - followed by opthalmology, Dr. Urbie     Hyperkalemia 2/28/2017    Hyperlipidemia     Hypogonadism male     Hypogonadism male     Mixed anxiety and depressive disorder     Morbid obesity     Obstructive sleep apnea on CPAP     CPAP    Osteoarthritis of left knee 7/25/2012  "   Paroxysmal atrial fibrillation 7/6/2016    Primary osteoarthritis of left knee 7/25/2012    Prominent aorta 1/25/2016    "RESULTS: THE HEART IS MILDLY ENLARGED WITH A SLIGHTLY PROMINENT AORTA" - Xray Chest PA & Lateral 12-     Prostate cancer 2/15/2016    - followed by urology, Dr. Young     Prostate cancer 2/15/2016    - followed by urology, Dr. 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    chest and forehead    Squamous cell cancer of skin of nose     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 7/6/2016    Type III open fracture of left tibia and fibula with routine healing 7/6/2016    Vitamin D deficiency disease     Vitreous detachment 3/18/2016     Past Surgical History:   Procedure Laterality Date    ADJACENT TISSUE TRANSFER Left 11/30/2020    Procedure: ADJACENT TISSUE TRANSFER x2 ;  Surgeon: Teresa Cooper MD;  Location: [facility] OR [address] [place] [address];  Service: Ophthalmology;  Laterality: Left;  Left glabellar 8.5x11x3 and Left upper eyelid advancement flap [id number]      Cardiac stenting x2      CATARACT EXTRACTION W/  INTRAOCULAR LENS IMPLANT Right 3/29/2016    Dr. Conteh    CATARACT EXTRACTION W/  INTRAOCULAR LENS IMPLANT Left 4/12/2016        COLONOSCOPY N/A 7/23/2020    Procedure: COLONOSCOPY;  Surgeon: Nico Lackey MD;  Location: [address]);  Service: Endoscopy;  Laterality: N/A;    ESOPHAGOGASTRODUODENOSCOPY N/A 7/23/2020    Procedure: EGD (ESOPHAGOGASTRODUODENOSCOPY);  Surgeon: Nico Lackey MD;  Location: [address]);  Service: Endoscopy;  Laterality: N/A;  per Dr Lackey-Will proceed with EGD and colonoscopy on the 2nd floor due to his comorbidities including obesity, sleep apnea, restrictive lung disease, coronary artery disease.  has loop recorder      ok to hold Eliquis 2 days per Dr Sandhu-must remain " "   EXTERNAL FIXATION TIBIAL FRACTURE Left 07/01/2016    INSERTION OF IMPLANTABLE LOOP RECORDER  04/07/2017    LEFT HEART CATHETERIZATION Left 1/30/2020    Procedure: Left heart cath;  Surgeon: Benjamin Sandhu MD;  Location: [facility] CATH LAB;  Service: Cardiology;  Laterality: Left;  RN PREOP 1/28/20--Pt starting Plavix loading dose today (8pills)- Dr. Sandhu aware.  Pt has a bandaged "non healing area to LLE"--Dr. Sandhu aware.    LEFT HEART CATHETERIZATION Left 2/14/2020    Procedure: Left heart cath, IVUS guided left main / LAD PCI. Noon start, radial approach;  Surgeon: Miguel Angel Brady MD;  Location: [facility] CATH LAB;  Service: Cardiology;  Laterality: Left;  RN PRE OP 2-7-2020  BMI--45.61    ORIF TIBIA FRACTURE Left 07/15/2016    PROBING OF NASOLACRIMAL DUCT WITH INSERTION OF TUBE Left 11/30/2020    Procedure: PROBING, NASOLACRIMAL DUCT,;  Surgeon: Tereas Cooper MD;  Location: [facility] OR [address] [place] [address];  Service: Ophthalmology;  Laterality: Left;    RADIOACTIVE SEED IMPLANTATION OF PROSTATE N/A 8/8/2018    Procedure: INSERTION, RADIOACTIVE SEED, PROSTATE;  Surgeon: Bipin Thompson MD;  Location: [facility] OR [address] [place] [address];  Service: Urology;  Laterality: N/A;  1 hour    Squamous cell cancer removal x3 with Mohs surgery      TONSILLECTOMY      TOTAL KNEE ARTHROPLASTY  10/2012    TRIGGER FINGER RELEASE Right 10/14/2020    Procedure: RELEASE, TRIGGER FINGER - right;  Surgeon: Rad Swift MD;  Location: [facility];  Service: Orthopedics;  Laterality: Right;    trus/bx      ULTRASOUND GUIDANCE  2/14/2020    Procedure: Ultrasound Guidance;  Surgeon: Miguel Angel Brady MD;  Location: [facility] CATH LAB;  Service: Cardiology;;     Family History   Problem Relation Age of Onset    Skin cancer Father     Lung cancer Father     Cancer Father         smoker,     Alzheimer's disease Mother     Hypertension Mother     Cancer Sister         colon, lung cancer     No Known Problems Sister     Cancer Brother         skin cancer, polypectomy "     Peripheral vascular disease Unknown     No Known Problems Maternal Aunt     No Known Problems Maternal Uncle     No Known Problems Paternal Aunt     No Known Problems Paternal Uncle     No Known Problems Maternal Grandmother     No Known Problems Maternal Grandfather     No Known Problems Paternal Grandmother     No Known Problems Paternal Grandfather     Melanoma Neg Hx     Psoriasis Neg Hx     Lupus Neg Hx     Eczema Neg Hx     Amblyopia Neg Hx     Blindness Neg Hx     Cataracts Neg Hx     Diabetes Neg Hx     Glaucoma Neg Hx     Macular degeneration Neg Hx     Retinal detachment Neg Hx     Strabismus Neg Hx     Stroke Neg Hx     Thyroid disease Neg Hx     Acne Neg Hx      Social History     Socioeconomic History    Marital status:      Spouse name: Not on file    Number of children: Not on file    Years of education: Not on file    Highest education level: Not on file   Occupational History    Occupation: Retired    Social Needs    Financial resource strain: Not hard at all    Food insecurity     Worry: Never true     Inability: Never true    Transportation needs     Medical: No     Non-medical: No   Tobacco Use    Smoking status: Never Smoker    Smokeless tobacco: Never Used   Substance and Sexual Activity    Alcohol use: Yes     Frequency: 2-4 times a month     Drinks per session: 3 or 4     Binge frequency: Never     Comment: occasionally, beer    Drug use: Never    Sexual activity: Yes     Partners: Female   Lifestyle    Physical activity     Days per week: 0 days     Minutes per session: 0 min    Stress: Only a little   Relationships    Social connections     Talks on phone: Twice a week     Gets together: Once a week     Attends Sikhism service: More than 4 times per year     Active member of club or organization: Yes     Attends meetings of clubs or organizations: More than 4 times per year     Relationship status:    Other Topics Concern    Not  on file   Social History Narrative    Not on file       Current Outpatient Medications:     acyclovir (ZOVIRAX) 800 MG Tab, Take 1 tablet (800 mg total) by mouth 2 (two) times daily., Disp: 180 tablet, Rfl: 6    albuterol (PROVENTIL/VENTOLIN HFA) 90 mcg/actuation inhaler, Inhale 2 puffs into the lungs every 6 (six) hours as needed for Wheezing. Rescue, Disp: 18 g, Rfl: 0    apixaban (ELIQUIS) 5 mg Tab, Take 1 tablet (5 mg total) by mouth 2 (two) times daily., Disp: 180 tablet, Rfl: 4    artificial tears (ISOPTO TEARS) 0.5 % ophthalmic solution, Place 1 drop into both eyes as needed., Disp: , Rfl:     atorvastatin (LIPITOR) 40 MG tablet, Take 1 tablet (40 mg total) by mouth once daily. (Patient taking differently: Take 40 mg by mouth every evening. ), Disp: 90 tablet, Rfl: 1    clopidogreL (PLAVIX) 75 mg tablet, Take 1 tablet (75 mg total) by mouth once daily., Disp: 90 tablet, Rfl: 3    CYANOCOBALAMIN, VITAMIN B-12, (VITAMIN B-12 ORAL), Take 2,500 mcg by mouth once daily., Disp: , Rfl:     fluticasone furoate-vilanteroL (BREO) 100-25 mcg/dose diskus inhaler, Inhale 1 puff into the lungs once daily. Controller, Disp: 60 each, Rfl: 3    fluticasone propionate (FLONASE) 50 mcg/actuation nasal spray, 1 spray (50 mcg total) by Each Nostril route once daily., Disp: 1 Bottle, Rfl: 0    gabapentin (NEURONTIN) 300 MG capsule, Take 1 capsule (300 mg total) by mouth 3 (three) times daily., Disp: 270 capsule, Rfl: 1    HYDROcodone-acetaminophen (NORCO) 7.5-325 mg per tablet, Take 1 tablet by mouth every 6 (six) hours as needed., Disp: 28 tablet, Rfl: 0    melatonin 5 mg Tab, Take 10 mg by mouth nightly., Disp: , Rfl:     metoprolol succinate (TOPROL-XL) 25 MG 24 hr tablet, Take 25 mg by mouth once daily. , Disp: , Rfl:     multivitamin (MULTIPLE VITAMINS) per tablet, Take 1 tablet by mouth once daily., Disp: , Rfl:     neomycin-polymyxin-dexamethasone (MAXITROL) 3.5 mg/g-10,000 unit/g-0.1 % Oint, Apply thin  ribbon to left eyelid wounds, Disp: 3.5 g, Rfl: 3    omeprazole (PRILOSEC) 20 MG capsule, Take 1 capsule (20 mg total) by mouth daily as needed., Disp: 90 capsule, Rfl: 0    oxybutynin (DITROPAN-XL) 5 MG TR24, Take 1 tablet (5 mg total) by mouth once daily., Disp: 30 tablet, Rfl: 11    tamsulosin (FLOMAX) 0.4 mg Cap, Take 1 capsule (0.4 mg total) by mouth once daily., Disp: 90 capsule, Rfl: 3    triamcinolone acetonide 0.1% (KENALOG) 0.1 % cream, Apply topically 2 (two) times daily. Apply to affected area twice daily as directed., Disp: 453.6 g, Rfl: 0    venlafaxine (EFFEXOR) 75 MG tablet, Take 2 tablets (150 mg total) by mouth 2 (two) times daily., Disp: 360 tablet, Rfl: 1    vitamin D 1000 units Tab, Take 1 tablet (1,000 Units total) by mouth once daily., Disp: , Rfl:     aspirin (ECOTRIN) 81 MG EC tablet, Take 81 mg by mouth once daily., Disp: , Rfl:     HYDROcodone-acetaminophen (NORCO) 7.5-325 mg per tablet, Take 1 tablet by mouth every 6 (six) hours as needed for Pain. (Patient not taking: Reported on 12/9/2020), Disp: 16 tablet, Rfl: 0    nitroGLYCERIN (NITROSTAT) 0.4 MG SL tablet, Place 1 tablet (0.4 mg total) under the tongue every 5 (five) minutes as needed for Chest pain., Disp: 25 tablet, Rfl: 11    REVIEW OF SYSTEMS:  General: No fevers or chills; ENT: No sore throat; Allergy and Immunology: no persistent infections; Hematological and Lymphatic: No history of bleeding or easy bruising; Endocrine: negative; Respiratory: no cough, shortness of breath, or wheezing; Cardiovascular: no chest pain or dyspnea on exertion; no claudication, no rest pain; Gastrointestinal: no abdominal pain/back, change in bowel habits, or bloody stools; Genito-Urinary: no dysuria, trouble voiding, or hematuria; Musculoskeletal: negative, + wound; Neurological: no TIA or stroke symptoms; Psychiatric: no nervousness, anxiety or depression.    PHYSICAL EXAM:                General appearance:  Alert, well-appearing, and  in no distress.  Oriented to person, place, and time                    Neurological: Normal speech, no focal findings noted; CN II - XII grossly intact. RLE with sensation to light touch, LLE with sensation to light touch.            Musculoskeletal: Digits/nail without cyanosis/clubbing.  Strength 5/5 BLE.                    Neck: Supple, no significant adenopathy                  Chest:  Clear to auscultation, no wheezes, rales or rhonchi, symmetric air entry. No use of accessory muscles               Cardiac: Normal rate and regular rhythm, S1 and S2 normal            Abdomen: Soft, nontender, nondistended, no masses or organomegaly, no hernia     No rebound tenderness noted; bowel sounds normal          No groin adenopathy      Extremities:2+ R femoral pulse, 2+ L femoral pulse     2+ R popliteal pulse, 2+ L popliteal pulse     1+ R PT pulse, 1+ L PT pulse     1+ R DP pulse, 1+ L DP pulse     1+ RLE edema, 2+ LLE edema    Skin: RLE no tissue loss; LLE + tissue loss    LAB RESULTS:  No results found for: CBC  Lab Results   Component Value Date    LABPROT 10.5 01/28/2020    INR 1.0 01/28/2020     Lab Results   Component Value Date     11/23/2020    K 4.5 11/23/2020     11/23/2020    CO2 25 11/23/2020    GLU 93 11/23/2020    BUN 26 (H) 11/23/2020    CREATININE 1.5 (H) 11/23/2020    CALCIUM 8.6 (L) 11/23/2020    ANIONGAP 9 11/23/2020    EGFRNONAA 46 (A) 11/23/2020     Lab Results   Component Value Date    WBC 11.86 11/23/2020    RBC 3.48 (L) 11/23/2020    HGB 11.6 (L) 11/23/2020    HCT 35.2 (L) 11/23/2020     (H) 11/23/2020    MCH 33.3 (H) 11/23/2020    MCHC 33.0 11/23/2020    RDW 13.2 11/23/2020     11/23/2020    MPV 10.1 11/23/2020    GRAN 8.3 (H) 11/23/2020    GRAN 70.4 11/23/2020    LYMPH 2.3 11/23/2020    LYMPH 19.6 11/23/2020    MONO 0.8 11/23/2020    MONO 6.3 11/23/2020    EOS 0.3 11/23/2020    BASO 0.10 11/23/2020    EOSINOPHIL 2.7 11/23/2020    BASOPHIL 0.8 11/23/2020     DIFFMETHOD Automated 11/23/2020     .  Lab Results   Component Value Date    HGBA1C 5.6 02/11/2020       IMAGING:  All pertinent imaging has been reviewed and interpreted independently.    SAMANTHA 9/2020: 0.8/0.7    6/2020: No venous reflux or DVT    10/2020:  No reflux or DVT.    IMP/PLAN:  75 y.o. male with   Patient Active Problem List   Diagnosis    Essential hypertension    Other hyperlipidemia    Coronary artery disease involving native coronary artery of native heart without angina pectoris s/p RCA stent    CHARISMA on CPAP    GERD (gastroesophageal reflux disease)    Benign prostatic hyperplasia with urinary obstruction    Morbid obesity with body mass index of 45.0-49.9 in adult    Prostate cancer    Paroxysmal atrial fibrillation     Major depressive disorder, recurrent episode, mild    Generalized anxiety disorder    History of gout    Herpes simplex keratoconjunctivitis    Decreased range of motion of left ankle    Right leg weakness    Impaired mobility    Balance problems    PVD (peripheral vascular disease)    Bilateral leg edema    Bilateral carotid artery stenosis    Gait abnormality    Left leg weakness    Floppy eyelid syndrome    PURDY (dyspnea on exertion)    Insomnia    Small airways disease    Personal history of asbestosis    CAD (coronary artery disease)    Venous stasis dermatitis    Acute kidney injury superimposed on chronic kidney disease    CKD (chronic kidney disease) stage 3, GFR 30-59 ml/min    Hypotension    History of loop recorder    Trigger thumb, right thumb    Asbestosis    Macrocytic anemia    Autoeczematization    Basal cell carcinoma (BCC) of medial canthus of left eye    being managed by PCP and specialists who is here today for evaluation of PVD.    -LLE edema with BCC wound medial lower leg now healed - recommend compression, elevation, dietary changes associated with water and sodium intake discussed at length with patient; cont wound  care  -wound is multifactorial likely due to cancer, edema and PVD  -Mild BLE PVD with no claudication, no rest pain, healed wound.  Imaging reviewed. - rec daily ASA.    -Exercise / weight loss  -Heart healthy lifestyle  -Cont routine surveillance of carotid stenosis  -RTC 6 mo with SAMANTHA    I spent 12 minutes evaluating this patient and greater than 50% of the time was spent counseling, coordinator care and discussing the plan of care.  All questions were answered and patient stated understanding with agreement with the above treatment plan.    Sukhi Chaudhry MD [facility]  Vascular and Endovascular Surgery

## 2020-12-10 ENCOUNTER — LAB VISIT (OUTPATIENT)
Dept: LAB | Facility: HOSPITAL | Age: 75
End: 2020-12-10
Attending: NURSE PRACTITIONER
Payer: MEDICARE

## 2020-12-10 ENCOUNTER — OFFICE VISIT (OUTPATIENT)
Dept: PULMONOLOGY | Facility: CLINIC | Age: 75
End: 2020-12-10
Payer: MEDICARE

## 2020-12-10 VITALS
DIASTOLIC BLOOD PRESSURE: 72 MMHG | WEIGHT: 315 LBS | BODY MASS INDEX: 41.75 KG/M2 | TEMPERATURE: 98 F | HEIGHT: 73 IN | HEART RATE: 83 BPM | SYSTOLIC BLOOD PRESSURE: 183 MMHG | OXYGEN SATURATION: 93 %

## 2020-12-10 DIAGNOSIS — J61 ASBESTOSIS: ICD-10-CM

## 2020-12-10 DIAGNOSIS — D53.9 MACROCYTIC ANEMIA: ICD-10-CM

## 2020-12-10 DIAGNOSIS — E55.9 VITAMIN D DEFICIENCY: ICD-10-CM

## 2020-12-10 DIAGNOSIS — R06.02 SOB (SHORTNESS OF BREATH) ON EXERTION: Primary | ICD-10-CM

## 2020-12-10 PROCEDURE — 82306 VITAMIN D 25 HYDROXY: CPT | Mod: HCNC

## 2020-12-10 PROCEDURE — 1157F PR ADVANCE CARE PLAN OR EQUIV PRESENT IN MEDICAL RECORD: ICD-10-PCS | Mod: HCNC,S$GLB,, | Performed by: NURSE PRACTITIONER

## 2020-12-10 PROCEDURE — 1101F PT FALLS ASSESS-DOCD LE1/YR: CPT | Mod: HCNC,CPTII,S$GLB, | Performed by: NURSE PRACTITIONER

## 2020-12-10 PROCEDURE — 3288F FALL RISK ASSESSMENT DOCD: CPT | Mod: HCNC,CPTII,S$GLB, | Performed by: NURSE PRACTITIONER

## 2020-12-10 PROCEDURE — 99213 OFFICE O/P EST LOW 20 MIN: CPT | Mod: HCNC,S$GLB,, | Performed by: NURSE PRACTITIONER

## 2020-12-10 PROCEDURE — 3008F PR BODY MASS INDEX (BMI) DOCUMENTED: ICD-10-PCS | Mod: HCNC,CPTII,S$GLB, | Performed by: NURSE PRACTITIONER

## 2020-12-10 PROCEDURE — 1101F PR PT FALLS ASSESS DOC 0-1 FALLS W/OUT INJ PAST YR: ICD-10-PCS | Mod: HCNC,CPTII,S$GLB, | Performed by: NURSE PRACTITIONER

## 2020-12-10 PROCEDURE — 99213 PR OFFICE/OUTPT VISIT, EST, LEVL III, 20-29 MIN: ICD-10-PCS | Mod: HCNC,S$GLB,, | Performed by: NURSE PRACTITIONER

## 2020-12-10 PROCEDURE — 1159F MED LIST DOCD IN RCRD: CPT | Mod: HCNC,S$GLB,, | Performed by: NURSE PRACTITIONER

## 2020-12-10 PROCEDURE — 99499 RISK ADDL DX/OHS AUDIT: ICD-10-PCS | Mod: S$GLB,,, | Performed by: NURSE PRACTITIONER

## 2020-12-10 PROCEDURE — 99499 UNLISTED E&M SERVICE: CPT | Mod: S$GLB,,, | Performed by: NURSE PRACTITIONER

## 2020-12-10 PROCEDURE — 3288F PR FALLS RISK ASSESSMENT DOCUMENTED: ICD-10-PCS | Mod: HCNC,CPTII,S$GLB, | Performed by: NURSE PRACTITIONER

## 2020-12-10 PROCEDURE — 99999 PR PBB SHADOW E&M-EST. PATIENT-LVL V: CPT | Mod: PBBFAC,HCNC,, | Performed by: NURSE PRACTITIONER

## 2020-12-10 PROCEDURE — 99999 PR PBB SHADOW E&M-EST. PATIENT-LVL V: ICD-10-PCS | Mod: PBBFAC,HCNC,, | Performed by: NURSE PRACTITIONER

## 2020-12-10 PROCEDURE — 82607 VITAMIN B-12: CPT | Mod: HCNC

## 2020-12-10 PROCEDURE — 3077F PR MOST RECENT SYSTOLIC BLOOD PRESSURE >= 140 MM HG: ICD-10-PCS | Mod: HCNC,CPTII,S$GLB, | Performed by: NURSE PRACTITIONER

## 2020-12-10 PROCEDURE — 82746 ASSAY OF FOLIC ACID SERUM: CPT | Mod: HCNC

## 2020-12-10 PROCEDURE — 3008F BODY MASS INDEX DOCD: CPT | Mod: HCNC,CPTII,S$GLB, | Performed by: NURSE PRACTITIONER

## 2020-12-10 PROCEDURE — 36415 COLL VENOUS BLD VENIPUNCTURE: CPT | Mod: HCNC

## 2020-12-10 PROCEDURE — 3078F DIAST BP <80 MM HG: CPT | Mod: HCNC,CPTII,S$GLB, | Performed by: NURSE PRACTITIONER

## 2020-12-10 PROCEDURE — 1126F AMNT PAIN NOTED NONE PRSNT: CPT | Mod: HCNC,S$GLB,, | Performed by: NURSE PRACTITIONER

## 2020-12-10 PROCEDURE — 1126F PR PAIN SEVERITY QUANTIFIED, NO PAIN PRESENT: ICD-10-PCS | Mod: HCNC,S$GLB,, | Performed by: NURSE PRACTITIONER

## 2020-12-10 PROCEDURE — 1157F ADVNC CARE PLAN IN RCRD: CPT | Mod: HCNC,S$GLB,, | Performed by: NURSE PRACTITIONER

## 2020-12-10 PROCEDURE — 3078F PR MOST RECENT DIASTOLIC BLOOD PRESSURE < 80 MM HG: ICD-10-PCS | Mod: HCNC,CPTII,S$GLB, | Performed by: NURSE PRACTITIONER

## 2020-12-10 PROCEDURE — 1159F PR MEDICATION LIST DOCUMENTED IN MEDICAL RECORD: ICD-10-PCS | Mod: HCNC,S$GLB,, | Performed by: NURSE PRACTITIONER

## 2020-12-10 PROCEDURE — 3077F SYST BP >= 140 MM HG: CPT | Mod: HCNC,CPTII,S$GLB, | Performed by: NURSE PRACTITIONER

## 2020-12-10 NOTE — ASSESSMENT & PLAN NOTE
Noted to have rales and leg swelling at visit, recommend ECHO  Trial Breo FEF 25-75 reduced indicating small airway resistance
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
## 2020-12-11 ENCOUNTER — LAB VISIT (OUTPATIENT)
Dept: FAMILY MEDICINE | Facility: CLINIC | Age: 75
End: 2020-12-11
Payer: MEDICARE

## 2020-12-11 DIAGNOSIS — Z13.9 SCREENING FOR UNSPECIFIED CONDITION: ICD-10-CM

## 2020-12-11 LAB
25(OH)D3+25(OH)D2 SERPL-MCNC: 17 NG/ML (ref 30–96)
FOLATE SERPL-MCNC: 6.3 NG/ML (ref 4–24)
VIT B12 SERPL-MCNC: 492 PG/ML (ref 210–950)

## 2020-12-11 PROCEDURE — U0003 INFECTIOUS AGENT DETECTION BY NUCLEIC ACID (DNA OR RNA); SEVERE ACUTE RESPIRATORY SYNDROME CORONAVIRUS 2 (SARS-COV-2) (CORONAVIRUS DISEASE [COVID-19]), AMPLIFIED PROBE TECHNIQUE, MAKING USE OF HIGH THROUGHPUT TECHNOLOGIES AS DESCRIBED BY CMS-2020-01-R: HCPCS | Mod: HCNC

## 2020-12-12 LAB — SARS-COV-2 RNA RESP QL NAA+PROBE: NOT DETECTED

## 2020-12-14 ENCOUNTER — HOSPITAL ENCOUNTER (OUTPATIENT)
Dept: CARDIOLOGY | Facility: HOSPITAL | Age: 75
Discharge: HOME | End: 2020-12-14
Attending: NURSE PRACTITIONER
Payer: MEDICARE

## 2020-12-14 DIAGNOSIS — R06.02 SOB (SHORTNESS OF BREATH) ON EXERTION: ICD-10-CM

## 2020-12-14 LAB
ASCENDING AORTA: 3.06 CM
AV INDEX (PROSTH): 0.91
AV MEAN GRADIENT: 5 MMHG
AV PEAK GRADIENT: 8 MMHG
AV VALVE AREA: 4.66 CM2
AV VELOCITY RATIO: 0.92
CV ECHO LV RWT: 0.48 CM
DOP CALC AO PEAK VEL: 1.42 M/S
DOP CALC AO VTI: 34.46 CM
DOP CALC LVOT AREA: 5.1 CM2
DOP CALC LVOT DIAMETER: 2.55 CM
DOP CALC LVOT PEAK VEL: 1.3 M/S
DOP CALC LVOT STROKE VOLUME: 160.48 CM3
DOP CALCLVOT PEAK VEL VTI: 31.44 CM
E WAVE DECELERATION TIME: 218.32 MSEC
E/A RATIO: 1.86
E/E' RATIO: 17.6 M/S
ECHO LV POSTERIOR WALL: 1.27 CM (ref 0.6–1.1)
FRACTIONAL SHORTENING: 34 % (ref 28–44)
INTERVENTRICULAR SEPTUM: 1.22 CM (ref 0.6–1.1)
IVRT: 106.11 MSEC
LA MAJOR: 5.76 CM
LA MINOR: 5.76 CM
LA WIDTH: 4.79 CM
LEFT ATRIUM SIZE: 4.63 CM
LEFT ATRIUM VOLUME: 108.58 CM3
LEFT INTERNAL DIMENSION IN SYSTOLE: 3.47 CM (ref 2.1–4)
LEFT VENTRICLE DIASTOLIC VOLUME: 131.72 ML
LEFT VENTRICLE SYSTOLIC VOLUME: 49.77 ML
LEFT VENTRICULAR INTERNAL DIMENSION IN DIASTOLE: 5.24 CM (ref 3.5–6)
LEFT VENTRICULAR MASS: 265.19 G
LV LATERAL E/E' RATIO: 16.5 M/S
LV SEPTAL E/E' RATIO: 18.86 M/S
MV PEAK A VEL: 0.71 M/S
MV PEAK E VEL: 1.32 M/S
PISA TR MAX VEL: 1.23 M/S
PV PEAK VELOCITY: 1.07 CM/S
RA MAJOR: 5.11 CM
RA PRESSURE: 3 MMHG
RA WIDTH: 3.54 CM
RIGHT VENTRICULAR END-DIASTOLIC DIMENSION: 3.9 CM
RV TISSUE DOPPLER FREE WALL SYSTOLIC VELOCITY 1 (APICAL 4 CHAMBER VIEW): 13.67 CM/S
SINUS: 3.55 CM
STJ: 2.97 CM
TDI LATERAL: 0.08 M/S
TDI SEPTAL: 0.07 M/S
TDI: 0.08 M/S
TR MAX PG: 6 MMHG
TRICUSPID ANNULAR PLANE SYSTOLIC EXCURSION: 2.69 CM
TV REST PULMONARY ARTERY PRESSURE: 9 MMHG

## 2020-12-14 PROCEDURE — 93306 TTE W/DOPPLER COMPLETE: CPT | Mod: 26,HCNC,, | Performed by: INTERNAL MEDICINE

## 2020-12-14 PROCEDURE — 93306 ECHO (CUPID ONLY): ICD-10-PCS | Mod: 26,HCNC,, | Performed by: INTERNAL MEDICINE

## 2020-12-14 PROCEDURE — 93306 TTE W/DOPPLER COMPLETE: CPT | Mod: HCNC

## 2020-12-15 ENCOUNTER — PROCEDURE VISIT (OUTPATIENT)
Dept: OPHTHALMOLOGY | Facility: CLINIC | Age: 75
End: 2020-12-15
Payer: MEDICARE

## 2020-12-15 VITALS — SYSTOLIC BLOOD PRESSURE: 118 MMHG | DIASTOLIC BLOOD PRESSURE: 59 MMHG | HEART RATE: 73 BPM

## 2020-12-15 DIAGNOSIS — C44.111 BASAL CELL CARCINOMA (BCC) OF MEDIAL CANTHUS OF LEFT EYE: ICD-10-CM

## 2020-12-15 DIAGNOSIS — T81.31XD POSTOPERATIVE WOUND DEHISCENCE, SUBSEQUENT ENCOUNTER: Primary | ICD-10-CM

## 2020-12-15 DIAGNOSIS — Z98.890 POST-OPERATIVE STATE: ICD-10-CM

## 2020-12-15 DIAGNOSIS — H02.59 FLOPPY EYELID SYNDROME: ICD-10-CM

## 2020-12-15 PROCEDURE — 99499 NO LOS: ICD-10-PCS | Mod: HCNC,S$GLB,, | Performed by: OPHTHALMOLOGY

## 2020-12-15 PROCEDURE — 13160 PR SECD CLOS SURG WND EXTEN/COMPLIC: ICD-10-PCS | Mod: 78,HCNC,S$GLB, | Performed by: OPHTHALMOLOGY

## 2020-12-15 PROCEDURE — 13160 SEC CLSR SURG WND/DEHSN XTN: CPT | Mod: 78,HCNC,S$GLB, | Performed by: OPHTHALMOLOGY

## 2020-12-15 PROCEDURE — 99499 UNLISTED E&M SERVICE: CPT | Mod: HCNC,S$GLB,, | Performed by: OPHTHALMOLOGY

## 2020-12-15 RX ORDER — SULFAMETHOXAZOLE AND TRIMETHOPRIM 800; 160 MG/1; MG/1
1 TABLET ORAL 2 TIMES DAILY
Qty: 20 TABLET | Refills: 0 | Status: SHIPPED | OUTPATIENT
Start: 2020-12-15 | End: 2021-02-17

## 2020-12-15 NOTE — PROGRESS NOTES
HPI     Pt is here today for repair of left upper eyelid wound deshiscence of   MOH'S defect closure OS.    Last edited by Teresa Cooper MD on 12/15/2020 12:32 PM. (History)            Assessment /Plan     For exam results, see Encounter Report.    Postoperative wound dehiscence, subsequent encounter  -     sulfamethoxazole-trimethoprim 800-160mg (BACTRIM DS) 800-160 mg Tab; Take 1 tablet by mouth 2 (two) times daily.  Dispense: 20 tablet; Refill: 0    Post-operative state    Basal cell carcinoma (BCC) of medial canthus of left eye    Floppy eyelid syndrome      PROCEDURE NOTE:  Attending: Teresa Cooper M.D.  Medical Student: Nico Reyes, M.D.  Procedure: Left upper eyelid wound dehiscence repair (72633)  Pre-op Dx: Left upper eyelid wound dehiscence  Post-op Dx: same  Local: 2% lidocaine with epinephrine with 0.5% marcaine and vitrase   Specimens: None  Complications: None  Blood Loss: minimal     The left upper eyelid was marked and the advancement flap was extended. 4.5 cc of local anesthetic was injected. The patient was prepped and draped in the usual sterile manner for ophthalmic plastic surgery.    A 4-0 silk traction suture was placed at the eyelid margin. The hematoma and granulation tissue were removed with forceps and a 15 blade. The old sutures were removed. The eyelid flap was extended laterally with dissection in the superficial orbicularis plane with a 15 blade. Hemostasis was obtained with monopolar cautery. The skin and muscle flap measured [id number] mm. Interrupted 5-0, 6-0 and a running prolene on g-1 were used to reappose the advancement flap to the rhomboid flap.     The corneal shield was removed and traction sutures cut and removed. Tobradex ointment was applied to the wounds. The patient tolerated the procedure well. There were no complications.      Post-operative instructions were given to the patient both verbally and written. Resume plavix and aspirin tomorrow.      Return in one week PRN  sooner any worsening

## 2020-12-15 NOTE — PROGRESS NOTES
HPI     Janusz Montgomery is a/an 75 y.o. male here for 1 week post-op.  Pt denies any pain but states the wound does itch.  Date of Procedure: 11/30/20  Procedure: MOH'S closure  Oral antibiotics: none  Eye meds: Maxitrol ann TID OU      Last edited by Pollo Etienne on 12/8/2020  2:28 PM. (History)            Assessment /Plan     For exam results, see Encounter Report.    Post-operative state  -     External/Slit Lamp Photography  -     SUTURE REMOVAL    Postoperative wound dehiscence, initial encounter    Basal cell carcinoma (BCC) of medial canthus of left eye    Floppy eyelid syndrome    Posterior capsular opacification, bilateral      Patient with tearing and irritation of the left eye. Patient with left upper eyelid wound dehiscence between rhomboid flap and eyelid advancement flap.    Plan for repair of left upper eyelid wound dehiscence in the procedure room next Tuesday.     Informed consent obtained after extensive risks/benefits/alternatives were discussed with the patient including but not limited to pain, bleeding, infection, ocular injury, loss of the eye, asymmetry, need for revision in future, scarring.  Alternatives such as waiting were discussed.  All questions were answered.      Hold ASA, NSAIDS, and fish oil 5 to 7 days prior to procedure.    Return for surgery

## 2020-12-17 ENCOUNTER — OFFICE VISIT (OUTPATIENT)
Dept: DERMATOLOGY | Facility: CLINIC | Age: 75
End: 2020-12-17
Payer: MEDICARE

## 2020-12-17 DIAGNOSIS — L30.0 NUMMULAR ECZEMA: ICD-10-CM

## 2020-12-17 DIAGNOSIS — L57.0 AK (ACTINIC KERATOSIS): Primary | ICD-10-CM

## 2020-12-17 DIAGNOSIS — I87.2 VENOUS STASIS DERMATITIS OF BOTH LOWER EXTREMITIES: ICD-10-CM

## 2020-12-17 DIAGNOSIS — Z85.828 PERSONAL HISTORY OF OTHER MALIGNANT NEOPLASM OF SKIN: ICD-10-CM

## 2020-12-17 DIAGNOSIS — I87.2 VENOUS STASIS DERMATITIS, UNSPECIFIED LATERALITY: ICD-10-CM

## 2020-12-17 PROCEDURE — 17000 PR DESTRUCTION(LASER SURGERY,CRYOSURGERY,CHEMOSURGERY),PREMALIGNANT LESIONS,FIRST LESION: ICD-10-PCS | Mod: HCNC,S$GLB,, | Performed by: DERMATOLOGY

## 2020-12-17 PROCEDURE — 1159F MED LIST DOCD IN RCRD: CPT | Mod: HCNC,S$GLB,, | Performed by: DERMATOLOGY

## 2020-12-17 PROCEDURE — 17000 DESTRUCT PREMALG LESION: CPT | Mod: HCNC,S$GLB,, | Performed by: DERMATOLOGY

## 2020-12-17 PROCEDURE — 1126F PR PAIN SEVERITY QUANTIFIED, NO PAIN PRESENT: ICD-10-PCS | Mod: HCNC,S$GLB,, | Performed by: DERMATOLOGY

## 2020-12-17 PROCEDURE — 99999 PR PBB SHADOW E&M-EST. PATIENT-LVL III: CPT | Mod: PBBFAC,HCNC,, | Performed by: DERMATOLOGY

## 2020-12-17 PROCEDURE — 1101F PR PT FALLS ASSESS DOC 0-1 FALLS W/OUT INJ PAST YR: ICD-10-PCS | Mod: HCNC,CPTII,S$GLB, | Performed by: DERMATOLOGY

## 2020-12-17 PROCEDURE — 1159F PR MEDICATION LIST DOCUMENTED IN MEDICAL RECORD: ICD-10-PCS | Mod: HCNC,S$GLB,, | Performed by: DERMATOLOGY

## 2020-12-17 PROCEDURE — 1157F PR ADVANCE CARE PLAN OR EQUIV PRESENT IN MEDICAL RECORD: ICD-10-PCS | Mod: HCNC,S$GLB,, | Performed by: DERMATOLOGY

## 2020-12-17 PROCEDURE — 99214 PR OFFICE/OUTPT VISIT, EST, LEVL IV, 30-39 MIN: ICD-10-PCS | Mod: 25,HCNC,S$GLB, | Performed by: DERMATOLOGY

## 2020-12-17 PROCEDURE — 1101F PT FALLS ASSESS-DOCD LE1/YR: CPT | Mod: HCNC,CPTII,S$GLB, | Performed by: DERMATOLOGY

## 2020-12-17 PROCEDURE — 3288F PR FALLS RISK ASSESSMENT DOCUMENTED: ICD-10-PCS | Mod: HCNC,CPTII,S$GLB, | Performed by: DERMATOLOGY

## 2020-12-17 PROCEDURE — 99214 OFFICE O/P EST MOD 30 MIN: CPT | Mod: 25,HCNC,S$GLB, | Performed by: DERMATOLOGY

## 2020-12-17 PROCEDURE — 99999 PR PBB SHADOW E&M-EST. PATIENT-LVL III: ICD-10-PCS | Mod: PBBFAC,HCNC,, | Performed by: DERMATOLOGY

## 2020-12-17 PROCEDURE — 3288F FALL RISK ASSESSMENT DOCD: CPT | Mod: HCNC,CPTII,S$GLB, | Performed by: DERMATOLOGY

## 2020-12-17 PROCEDURE — 1126F AMNT PAIN NOTED NONE PRSNT: CPT | Mod: HCNC,S$GLB,, | Performed by: DERMATOLOGY

## 2020-12-17 PROCEDURE — 1157F ADVNC CARE PLAN IN RCRD: CPT | Mod: HCNC,S$GLB,, | Performed by: DERMATOLOGY

## 2020-12-17 RX ORDER — DIAZEPAM 5 MG/1
TABLET ORAL
COMMUNITY
Start: 2020-12-14 | End: 2021-02-17

## 2020-12-17 RX ORDER — TRIAMCINOLONE ACETONIDE 1 MG/G
CREAM TOPICAL 2 TIMES DAILY
Qty: 453.6 G | Refills: 2 | Status: SHIPPED | OUTPATIENT
Start: 2020-12-17 | End: 2021-02-17

## 2020-12-17 RX ORDER — TRIAMCINOLONE ACETONIDE 1 MG/G
OINTMENT TOPICAL
Qty: 454 G | Refills: 3 | Status: SHIPPED | OUTPATIENT
Start: 2020-12-17 | End: 2022-01-01

## 2020-12-17 NOTE — PROGRESS NOTES
Subjective:       Patient ID:  Janusz Montgomery Jr. is a 75 y.o. male who presents for   Chief Complaint   Patient presents with    Skin Check     History of Present Illness: The patient presents for follow up of skin check.    The patient was last seen on: 11/09/2020 by [facility] for Venous stasis dermatitis- and autoeczematization to back-treated w/TAC cream qday. (started after flare of venous stasis 2/2 eating salty seafood. Legs improved but back and arms still itchy).  Does not wear compression hose. Keeps legs elevated frequently.   And bx or BCC of the left medial upper eyelid canthus-excised by [facility] 11/30/2020.    This is a high risk patient here to check for the development of new lesions.        Also has hx of itching on back, arms, and legs. No new medications. Has been using triamcinolone 0.1% cream qday. He tried to use OTC vaseline intensive care thick lotion has rupert butter-which helps a little                 Review of Systems   Constitutional: Positive for weight loss (wt # 345# ). Negative for fever and chills.   Cardiovascular: Positive for pedal edema (chronic LE edema).   Skin: Positive for itching, rash, daily sunscreen use, activity-related sunscreen use ( rarely outdoors) and wears hat ( when outdoors for long periods of time). Negative for recent sunburn.   Hematologic/Lymphatic: Bruises/bleeds easily (takes eliquis ).        Objective:    Physical Exam   Constitutional: He appears well-developed and well-nourished. He is obese.  No distress.   HENT:   Mouth/Throat: Lips normal.    Eyes: Lids are normal.  No conjunctival no injection.   Cardiovascular: There is dependent edema (legs).     Lymphadenopathy:        Head (right side): No submental, no submandibular, no preauricular, no posterior auricular and no occipital adenopathy present.        Head (left side): No submental, no submandibular, no preauricular, no posterior auricular and no occipital adenopathy present.     He has no axillary  adenopathy.   Neurological: He is alert and oriented to person, place, and time. He is not disoriented.   Psychiatric: He has a normal mood and affect.   Skin:   Areas Examined (abnormalities noted in diagram):   Scalp / Hair Palpated and Inspected  Head / Face Inspection Performed  Neck Inspection Performed  Chest / Axilla Inspection Performed  Back Inspection Performed  RUE Inspected  LUE Inspection Performed  RLE Inspected  LLE Inspection Performed  Nails and Digits Inspection Performed                   Diagram Legend     Erythematous scaling macule/papule c/w actinic keratosis       Vascular papule c/w angioma      Pigmented verrucoid papule/plaque c/w seborrheic keratosis      Yellow umbilicated papule c/w sebaceous hyperplasia      Irregularly shaped tan macule c/w lentigo     1-2 mm smooth white papules consistent with Milia      Movable subcutaneous cyst with punctum c/w epidermal inclusion cyst      Subcutaneous movable cyst c/w pilar cyst      Firm pink to brown papule c/w dermatofibroma      Pedunculated fleshy papule(s) c/w skin tag(s)      Evenly pigmented macule c/w junctional nevus     Mildly variegated pigmented, slightly irregular-bordered macule c/w mildly atypical nevus      Flesh colored to evenly pigmented papule c/w intradermal nevus       Pink pearly papule/plaque c/w basal cell carcinoma      Erythematous hyperkeratotic cursted plaque c/w SCC      Surgical scar with no sign of skin cancer recurrence      Open and closed comedones      Inflammatory papules and pustules      Verrucoid papule consistent consistent with wart     Erythematous eczematous patches and plaques     Dystrophic onycholytic nail with subungual debris c/w onychomycosis     Umbilicated papule    Erythematous-base heme-crusted tan verrucoid plaque consistent with inflamed seborrheic keratosis     Erythematous Silvery Scaling Plaque c/w Psoriasis     See annotation      Assessment / Plan:        AK (actinic  keratosis)  Cryosurgery Procedure Note    Verbal consent from the patient is obtained including, but not limited to, risk of hypopigmentation/hyperpigmentation, scar, recurrence of lesion. The patient is aware of the precancerous quality and need for treatment of these lesions. Liquid nitrogen cryosurgery is applied to the 1 actinic keratoses, as detailed in the physical exam, to produce a freeze injury. The patient is aware that blisters may form and is instructed on wound care with gentle cleansing and use of vaseline ointment to keep moist until healed. The patient is supplied a handout on cryosurgery and is instructed to call if lesions do not completely resolve.      Nummular eczema  -     triamcinolone acetonide 0.1% (KENALOG) 0.1 % cream; Apply topically 2 (two) times daily. Apply to affected area back and arms twice daily  Dispense: 453.6 g; Refill: 2  Good skin care regimen discussed including limiting to one bath or shower/day, using lukewarm water with mild soap and moisturizing cream to skin 1 - 2x/day. Brochure was provided and reviewed with patient.  cetaphil soap qday and cerave cream bid      Personal history of other malignant neoplasm of skin  Area(s) of previous NMSC evaluated with no signs of recurrence.    Upper body skin examination performed today including at least 6 points as noted in physical examination. No lesions suspicious for malignancy noted.      Venous stasis dermatitis of both lower extremities  Recommend frequent leg elevation.    Recommend compression hose (at least 18mm HG).    -     triamcinolone acetonide 0.1% (KENALOG) 0.1 % ointment; AAA lower legs bid - occlude qhs  Dispense: 454 g; Refill: 3             Follow up in about 6 months (around 6/17/2021).

## 2020-12-17 NOTE — PROGRESS NOTES
Digital [facility]: Health  Follow-Up    Calling to f/u w/ patient - BP avg 155/69    Mr. Boudreaux recently had surgery to remove skin cancer - Had several f/u surgeries afterwards - States he's been distracted and not as mindful of lifestyle recently. Acknowledges importance and plans to get back on it.    Patient admits he struggles to remember to take readings - Patient often has 2-3 doctor's appointments per week and admits he forgets to take BP at home after monitoring in-office.   Confirms understanding that Digital Med still requires at least 1 BP reading per week to continue, Despite taking BP in office.    Patient also confirms social distancing and quarantining due to COVID19 as much as possible - Encouraged.    Patient has my number and knows to call if needed.  Will continue to monitor and f/u to further discuss lifestyle progress after the holidays.      The history is provided by the patient.             Reason for review: Blood pressure not at goal        Topics Covered on Call: device use          Diet-no change to diet    No change to diet.  Patient reports eating or drinking the following: Patient reports no changes to his dietary habits - Feels confident w/ current routine and plans to continue as he has been.          Physical Activity-Change      Additional physical activity details: Patient has been taking it easy recently due to recovering from several surgeries.       Medication Adherence-Medication Adherence not addressed.        Substance, Sleep, Stress-change  stress-assessed  Details:Mr. Boudreaux indicates stress due to recent surgeries.   Intervention(s):    Sleep-not assessed  Details:  Intervention(s):    Alcohol -not assessed  Details:  Intervention(s):    Tobacco-Not Assessed  Details:  Intervention(s):          Additional monitoring needed. Patient plans to continue submitting regular readings for us to monitor  Continue current diet/physical activity routine. Patient plans to continue  recovering from recent surgeries       Addressed patient questions and patient has my contact information if needed prior to next outreach. Patient verbalizes understanding.      Explained the importance of self-monitoring and medication adherence. Encouraged the patient to communicate with their health  for lifestyle modifications to help improve or maintain a healthy lifestyle.               There are no preventive care reminders to display for this patient.    Last 5 Patient Entered Readings                                      Current 30 Day Average: 155/69     Recent Readings 12/16/2020 12/12/2020 12/12/2020 12/10/2020 12/10/2020    SBP (mmHg) 136 151 169 153 171    DBP (mmHg) 64 65 68 70 72    Pulse 70 64 67 66 68

## 2020-12-17 NOTE — PATIENT INSTRUCTIONS
XEROSIS (DRY SKIN)        1. Definition    Xerosis is the term for dry skin.  We all have a natural oil coating over our skin produced by the skin oil glands.  If this oil is removed, the skin becomes dry which can lead to cracking, which can lead to inflammation.  Xerosis is usually a long-term problem that recurs often, especially in the winter.    2. Cause     Long hot baths or showers can remove our natural oil and lead to xerosis.  One should never take more than one bath or shower a day and for no longer than ten minutes.   Use of harsh soaps such as Zest, Dial, and Ivory can worsen and cause xerosis.   Cold winter weather worsens xerosis because the amount of moisture contained in cold air is much less than the amount of moisture in warm air.    3. Treatment     Treatment is intended to restore the natural oil to your skin.  Keep the skin lubricated.     Do not take more than one bath or shower a day.  Use lukewarm water, not hot.  Hot water dries out the skin.     Use a gentle moisturizing soap such as Cetaphil soap, Oil of Olay, Dove, Basis, Ivory moisture care, Restoraderm cleanser.     When toweling dry, dont rub.  Blot the skin so there is still some water left on the skin.  You should apply a moisturizing cream to all of the skin such as Cerave cream, Cetaphil cream, Restoraderm or Eucerin Original Formula cream.   Alpha hydroxyacid lotions, i.e., AmLactin, also work very well for preventing dry skin, but may burn when used on inflamed or reddened skin.     If you like to swim during the winter months, you should not use soap when getting out of the pool.  When you have finished swimming, rinse off the chlorine with cool to warm water.  If this will be the only shower of the day, then you may use Cetaphil or another mild soap to cleanse your skin.  After the shower, apply a moisturizing cream to all of the skin as above.        [address] [facility] [place] [address]/ [phone]  [phone] FAX/ [email or web address]  XEROSIS (DRY SKIN)        4. Definition    Xerosis is the term for dry skin.  We all have a natural oil coating over our skin produced by the skin oil glands.  If this oil is removed, the skin becomes dry which can lead to cracking, which can lead to inflammation.  Xerosis is usually a long-term problem that recurs often, especially in the winter.    5. Cause     Long hot baths or showers can remove our natural oil and lead to xerosis.  One should never take more than one bath or shower a day and for no longer than ten minutes.   Use of harsh soaps such as Zest, Dial, and Ivory can worsen and cause xerosis.   Cold winter weather worsens xerosis because the amount of moisture contained in cold air is much less than the amount of moisture in warm air.    6. Treatment     Treatment is intended to restore the natural oil to your skin.  Keep the skin lubricated.     Do not take more than one bath or shower a day.  Use lukewarm water, not hot.  Hot water dries out the skin.     Use a gentle moisturizing soap such as Cetaphil soap, Oil of Olay, Dove, Basis, Ivory moisture care, Restoraderm cleanser.     When toweling dry, dont rub.  Blot the skin so there is still some water left on the skin.  You should apply a moisturizing cream to all of the skin such as Cerave cream, Cetaphil cream, Restoraderm or Eucerin Original Formula cream.   Alpha hydroxyacid lotions, i.e., AmLactin, also work very well for preventing dry skin, but may burn when used on inflamed or reddened skin.     If you like to swim during the winter months, you should not use soap when getting out of the pool.  When you have finished swimming, rinse off the chlorine with cool to warm water.  If this will be the only shower of the day, then you may use Cetaphil or another mild soap to cleanse your skin.  After the shower, apply a moisturizing cream to all of the skin as above.        [address] [facility]  [place] [address]/ [phone] FAX/ [email or web address]  CRYOSURGERY      Your doctor has used a method called cryosurgery to treat your skin condition. Cryosurgery refers to the use of very cold substances to treat a variety of skin conditions such as warts, pre-skin cancers, molluscum contagiosum, sun spots, and several benign growths. The substance we use in cryosurgery is liquid nitrogen and is so cold (-195 degrees Celsius) that is burns when administered.     Following treatment in the office, the skin may immediately burn and become red. You may find the area around the lesion is affected as well. It is sometimes necessary to treat not only the lesion, but a small area of the surrounding normal skin to achieve a good response.     A blister, and even a blood filled blister, may form after treatment.   This is a normal response. If the blister is painful, it is acceptable to sterilize a needle and with rubbing alcohol and gently pop the blister. It is important that you gently wash the area with soap and warm water as the blister fluid may contain wart virus if a wart was treated. Do no remove the roof of the blister.     The area treated can take anywhere from 1-3 weeks to heal. Healing time depends on the kind skin lesion treated, the location, and how aggressively the lesion was treated. It is recommended that the areas treated are covered with Vaseline or bacitracin ointment and a band-aid. If a band-aid is not practical, just ointment applied several times per day will do. Keeping these areas moist will speed the healing time.    Treatment with liquid nitrogen can leave a scar. In dark skin, it may be a light or dark scar, in light skin it may be a white or pink scar. These will generally fade with time, but may never go away completely.     If you have any concerns after your treatment, please feel free to call the office.       [address] [place] [address]/ [phone]  [phone] FAX/ [email or web address]

## 2020-12-28 ENCOUNTER — PATIENT MESSAGE (OUTPATIENT)
Dept: OPHTHALMOLOGY | Facility: CLINIC | Age: 75
End: 2020-12-28

## 2020-12-30 ENCOUNTER — OFFICE VISIT (OUTPATIENT)
Dept: GASTROENTEROLOGY | Facility: CLINIC | Age: 75
End: 2020-12-30
Payer: MEDICARE

## 2020-12-30 ENCOUNTER — LAB VISIT (OUTPATIENT)
Dept: LAB | Facility: HOSPITAL | Age: 75
End: 2020-12-30
Attending: STUDENT IN AN ORGANIZED HEALTH CARE EDUCATION/TRAINING PROGRAM
Payer: MEDICARE

## 2020-12-30 VITALS
WEIGHT: 315 LBS | DIASTOLIC BLOOD PRESSURE: 56 MMHG | HEIGHT: 73 IN | HEART RATE: 83 BPM | SYSTOLIC BLOOD PRESSURE: 128 MMHG | BODY MASS INDEX: 41.75 KG/M2

## 2020-12-30 DIAGNOSIS — R14.0 BLOATING: Primary | ICD-10-CM

## 2020-12-30 DIAGNOSIS — R68.81 EARLY SATIETY: ICD-10-CM

## 2020-12-30 DIAGNOSIS — R19.8 ABDOMINAL FULLNESS: ICD-10-CM

## 2020-12-30 PROCEDURE — 1159F PR MEDICATION LIST DOCUMENTED IN MEDICAL RECORD: ICD-10-PCS | Mod: HCNC,S$GLB,, | Performed by: STUDENT IN AN ORGANIZED HEALTH CARE EDUCATION/TRAINING PROGRAM

## 2020-12-30 PROCEDURE — 99214 PR OFFICE/OUTPT VISIT, EST, LEVL IV, 30-39 MIN: ICD-10-PCS | Mod: HCNC,S$GLB,, | Performed by: STUDENT IN AN ORGANIZED HEALTH CARE EDUCATION/TRAINING PROGRAM

## 2020-12-30 PROCEDURE — 99214 OFFICE O/P EST MOD 30 MIN: CPT | Mod: HCNC,S$GLB,, | Performed by: STUDENT IN AN ORGANIZED HEALTH CARE EDUCATION/TRAINING PROGRAM

## 2020-12-30 PROCEDURE — 3008F PR BODY MASS INDEX (BMI) DOCUMENTED: ICD-10-PCS | Mod: HCNC,CPTII,S$GLB, | Performed by: STUDENT IN AN ORGANIZED HEALTH CARE EDUCATION/TRAINING PROGRAM

## 2020-12-30 PROCEDURE — 1157F ADVNC CARE PLAN IN RCRD: CPT | Mod: HCNC,S$GLB,, | Performed by: STUDENT IN AN ORGANIZED HEALTH CARE EDUCATION/TRAINING PROGRAM

## 2020-12-30 PROCEDURE — 1126F AMNT PAIN NOTED NONE PRSNT: CPT | Mod: HCNC,S$GLB,, | Performed by: STUDENT IN AN ORGANIZED HEALTH CARE EDUCATION/TRAINING PROGRAM

## 2020-12-30 PROCEDURE — 99999 PR PBB SHADOW E&M-EST. PATIENT-LVL III: ICD-10-PCS | Mod: PBBFAC,HCNC,, | Performed by: STUDENT IN AN ORGANIZED HEALTH CARE EDUCATION/TRAINING PROGRAM

## 2020-12-30 PROCEDURE — 83516 IMMUNOASSAY NONANTIBODY: CPT | Mod: 59,HCNC

## 2020-12-30 PROCEDURE — 36415 COLL VENOUS BLD VENIPUNCTURE: CPT | Mod: HCNC

## 2020-12-30 PROCEDURE — 3078F PR MOST RECENT DIASTOLIC BLOOD PRESSURE < 80 MM HG: ICD-10-PCS | Mod: HCNC,CPTII,S$GLB, | Performed by: STUDENT IN AN ORGANIZED HEALTH CARE EDUCATION/TRAINING PROGRAM

## 2020-12-30 PROCEDURE — 3078F DIAST BP <80 MM HG: CPT | Mod: HCNC,CPTII,S$GLB, | Performed by: STUDENT IN AN ORGANIZED HEALTH CARE EDUCATION/TRAINING PROGRAM

## 2020-12-30 PROCEDURE — 3074F SYST BP LT 130 MM HG: CPT | Mod: HCNC,CPTII,S$GLB, | Performed by: STUDENT IN AN ORGANIZED HEALTH CARE EDUCATION/TRAINING PROGRAM

## 2020-12-30 PROCEDURE — 1157F PR ADVANCE CARE PLAN OR EQUIV PRESENT IN MEDICAL RECORD: ICD-10-PCS | Mod: HCNC,S$GLB,, | Performed by: STUDENT IN AN ORGANIZED HEALTH CARE EDUCATION/TRAINING PROGRAM

## 2020-12-30 PROCEDURE — 1159F MED LIST DOCD IN RCRD: CPT | Mod: HCNC,S$GLB,, | Performed by: STUDENT IN AN ORGANIZED HEALTH CARE EDUCATION/TRAINING PROGRAM

## 2020-12-30 PROCEDURE — 3288F FALL RISK ASSESSMENT DOCD: CPT | Mod: HCNC,CPTII,S$GLB, | Performed by: STUDENT IN AN ORGANIZED HEALTH CARE EDUCATION/TRAINING PROGRAM

## 2020-12-30 PROCEDURE — 1126F PR PAIN SEVERITY QUANTIFIED, NO PAIN PRESENT: ICD-10-PCS | Mod: HCNC,S$GLB,, | Performed by: STUDENT IN AN ORGANIZED HEALTH CARE EDUCATION/TRAINING PROGRAM

## 2020-12-30 PROCEDURE — 1101F PR PT FALLS ASSESS DOC 0-1 FALLS W/OUT INJ PAST YR: ICD-10-PCS | Mod: HCNC,CPTII,S$GLB, | Performed by: STUDENT IN AN ORGANIZED HEALTH CARE EDUCATION/TRAINING PROGRAM

## 2020-12-30 PROCEDURE — 3008F BODY MASS INDEX DOCD: CPT | Mod: HCNC,CPTII,S$GLB, | Performed by: STUDENT IN AN ORGANIZED HEALTH CARE EDUCATION/TRAINING PROGRAM

## 2020-12-30 PROCEDURE — 99999 PR PBB SHADOW E&M-EST. PATIENT-LVL III: CPT | Mod: PBBFAC,HCNC,, | Performed by: STUDENT IN AN ORGANIZED HEALTH CARE EDUCATION/TRAINING PROGRAM

## 2020-12-30 PROCEDURE — 1101F PT FALLS ASSESS-DOCD LE1/YR: CPT | Mod: HCNC,CPTII,S$GLB, | Performed by: STUDENT IN AN ORGANIZED HEALTH CARE EDUCATION/TRAINING PROGRAM

## 2020-12-30 PROCEDURE — 3288F PR FALLS RISK ASSESSMENT DOCUMENTED: ICD-10-PCS | Mod: HCNC,CPTII,S$GLB, | Performed by: STUDENT IN AN ORGANIZED HEALTH CARE EDUCATION/TRAINING PROGRAM

## 2020-12-30 PROCEDURE — 3074F PR MOST RECENT SYSTOLIC BLOOD PRESSURE < 130 MM HG: ICD-10-PCS | Mod: HCNC,CPTII,S$GLB, | Performed by: STUDENT IN AN ORGANIZED HEALTH CARE EDUCATION/TRAINING PROGRAM

## 2020-12-30 RX ORDER — AMOXICILLIN 500 MG/1
CAPSULE ORAL
COMMUNITY
Start: 2020-12-23 | End: 2021-01-13

## 2020-12-30 NOTE — PATIENT INSTRUCTIONS
-We will check blood work for celiac disease  -You will be scheduled for a breath test to assess for SIBO            Source: [email or web address]

## 2020-12-30 NOTE — PROGRESS NOTES
[facility] Consultation Note    Reason for Consult:  The primary encounter diagnosis was Bloating. Diagnoses of Early satiety and Abdominal fullness were also pertinent to this visit.    PCP:   Miguelina Weathers       Referring MD:  No referring provider defined for this encounter.    HPI:  This is a 75 y.o. male here for a follow-up appointment regarding abdominal bloating.     The patient informs me that he has had ongoing fullness and abdominal discomfort after eating small quantities.  He notes that since losing weight, he feels he can tolerate much less food.  He eats only half of his meal and feels that his abdomen becomes distended and he cannot tolerate any additional food.  Reports his abdomen becomes distended and noticeably bloated.     He denies any nausea, vomiting, constipation, or diarrhea.      ROS:  Constitutional: No fevers, chills, No weight loss  ENT: No allergies  CV: No chest pain  Pulm: No cough, No shortness of breath  Ophtho: No vision changes  GI: see HPI  Derm: No rash  Heme: No lymphadenopathy, No bruising  MSK: No arthritis  : No dysuria, No hematuria  Endo: No hot or cold intolerance  Neuro: No syncope, No seizure  Psych: No anxiety, No depression    Medical History:  has a past medical history of NAT (acute kidney injury) (3/19/2017), ALLERGIC RHINITIS, Anemia, Anxiety, Basal cell carcinoma (10/19/2018), Basal cell carcinoma (01/09/2019), Basal cell carcinoma (06/12/2020), Basal cell carcinoma (11/30/2020), Chronic rhinitis (5/3/2013), Chronic rhinitis (5/3/2013), Coronary artery disease involving native coronary artery of native heart without angina pectoris s/p RCA stent, Cortical cataract of both eyes (3/18/2016), Delayed sleep phase syndrome (3/13/2019), Depression, Erectile dysfunction (3/24/2014), Erectile dysfunction (3/24/2014), Essential hypertension, GERD (gastroesophageal reflux disease) (7/25/2012), Gout, arthritis, Grade III open fracture of left  tibia and fibula s/p ex-fix on 7/1/16 and ORIF of left tibia on 7/15 (7/6/2016), H/O: iritis, Helicobacter pylori (H. pylori) infection, Hepatitis, Herpes simplex keratoconjunctivitis (9/30/2015), Herpes simplex keratoconjunctivitis (9/30/2015), Hyperkalemia (2/28/2017), Hyperlipidemia, Hypogonadism male, Hypogonadism male, Keloid cicatrix, Mixed anxiety and depressive disorder, Morbid obesity, Obstructive sleep apnea on CPAP, Osteoarthritis of left knee (7/25/2012), Paroxysmal atrial fibrillation (7/6/2016), Primary osteoarthritis of left knee (7/25/2012), Prominent aorta (1/25/2016), Prostate cancer (2/15/2016), Prostate cancer (2/15/2016), PVD (peripheral vascular disease), Refractive error (3/18/2016), Skin disease, Skin ulcer, 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    Surgical History:  has a past surgical history that includes Cardiac stenting x2; Squamous cell cancer removal x3 with Mohs surgery; Tonsillectomy; trus/bx; Cataract extraction w/  intraocular lens implant (Right, 3/29/2016); Cataract extraction w/  intraocular lens implant (Left, 4/12/2016); ORIF tibia fracture (Left, 07/15/2016); External fixation tibial fracture (Left, 07/01/2016); Total knee arthroplasty (10/2012); Radioactive seed implantation of prostate (N/A, 8/8/2018); Insertion of implantable loop recorder (04/07/2017); Left heart catheterization (Left, 1/30/2020); Left heart catheterization (Left, 2/14/2020); Ultrasound guidance (2/14/2020); Esophagogastroduodenoscopy (N/A, 7/23/2020); Colonoscopy (N/A, 7/23/2020); Trigger finger release (Right, 10/14/2020); Probing of nasolacrimal duct with insertion of tube (Left, 11/30/2020); Adjacent tissue transfer  (Left, 11/30/2020); and Skin biopsy.    Family History: family history includes Alzheimer's disease in his mother; Cancer in his brother, father, and sister; Hypertension in his mother; Lung cancer in his father; No Known Problems in his maternal aunt, maternal grandfather, maternal grandmother, maternal uncle, paternal aunt, paternal grandfather, paternal grandmother, paternal uncle, and sister; Peripheral vascular disease in his unknown relative; Skin cancer in his father..     Social History:  reports that he has never smoked. He has never used smokeless tobacco. He reports current alcohol use of about 2.0 standard drinks of alcohol per week. He reports that he does not use drugs.    Review of patient's allergies indicates:   Allergen Reactions    Ciprofloxacin Rash     Diffuse pruritic morbilliform rash developed 3/15/2017 after dose of cipro; previously in 2/2017 he had rash/fevers after initiation of cipro    Zosyn [piperacillin-tazobactam] Rash     Diffuse pruritic morbilliform rash developed 3/15/2017.  Then, 430am dose on 3/16 and rash worsened with SOB/tachypnea but no hypoxemia.     Bacitracin Itching and Rash     Violaceous rash in area of topical Tx.        Current Outpatient Rx   Medication Sig Dispense Refill    acyclovir (ZOVIRAX) 800 MG Tab Take 1 tablet (800 mg total) by mouth 2 (two) times daily. 180 tablet 6    albuterol (PROVENTIL/VENTOLIN HFA) 90 mcg/actuation inhaler Inhale 2 puffs into the lungs every 6 (six) hours as needed for Wheezing. Rescue 18 g 0    amoxicillin (AMOXIL) 500 MG capsule TAKE 2 CAPSULES BY MOUTH NOW THEN 1 CAPSULE EVERY 6 HOURS UNTIL GONE      apixaban (ELIQUIS) 5 mg Tab Take 1 tablet (5 mg total) by mouth 2 (two) times daily. 180 tablet 4    artificial tears (ISOPTO TEARS) 0.5 % ophthalmic solution Place 1 drop into both eyes as needed.      atorvastatin (LIPITOR) 40 MG tablet Take 1 tablet (40 mg total) by mouth once daily. (Patient taking differently: Take 40  mg by mouth every evening. ) 90 tablet 1    clopidogreL (PLAVIX) 75 mg tablet Take 1 tablet (75 mg total) by mouth once daily. 90 tablet 3    CYANOCOBALAMIN, VITAMIN B-12, (VITAMIN B-12 ORAL) Take 2,500 mcg by mouth once daily.      diazePAM (VALIUM) 5 MG tablet BRING TO PROCEDURE      fluticasone furoate-vilanteroL (BREO) 100-25 mcg/dose diskus inhaler Inhale 1 puff into the lungs once daily. Controller 60 each 3    fluticasone propionate (FLONASE) 50 mcg/actuation nasal spray 1 spray (50 mcg total) by Each Nostril route once daily. 1 Bottle 0    gabapentin (NEURONTIN) 300 MG capsule Take 1 capsule (300 mg total) by mouth 3 (three) times daily. 270 capsule 1    HYDROcodone-acetaminophen (NORCO) 7.5-325 mg per tablet Take 1 tablet by mouth every 6 (six) hours as needed. 28 tablet 0    HYDROcodone-acetaminophen (NORCO) 7.5-325 mg per tablet Take 1 tablet by mouth every 6 (six) hours as needed for Pain. 16 tablet 0    melatonin 5 mg Tab Take 10 mg by mouth nightly.      metoprolol succinate (TOPROL-XL) 25 MG 24 hr tablet Take 25 mg by mouth once daily.       multivitamin (MULTIPLE VITAMINS) per tablet Take 1 tablet by mouth once daily.      neomycin-polymyxin-dexamethasone (MAXITROL) 3.5 mg/g-10,000 unit/g-0.1 % Oint Place into the left eye As instructed. Apply thin ribbon to left eyelid wounds 2 times a day for 1 week, then 1 time a day for 1 week, then stop 3.5 g 1    omeprazole (PRILOSEC) 20 MG capsule Take 1 capsule (20 mg total) by mouth daily as needed. 90 capsule 0    oxybutynin (DITROPAN-XL) 5 MG TR24 Take 1 tablet (5 mg total) by mouth once daily. 30 tablet 11    sulfamethoxazole-trimethoprim 800-160mg (BACTRIM DS) 800-160 mg Tab Take 1 tablet by mouth 2 (two) times daily. 20 tablet 0    tamsulosin (FLOMAX) 0.4 mg Cap Take 1 capsule (0.4 mg total) by mouth once daily. 90 capsule 3    triamcinolone acetonide 0.1% (KENALOG) 0.1 % cream Apply topically 2 (two) times daily. Apply to affected area  "back and arms twice daily 453.6 g 2    triamcinolone acetonide 0.1% (KENALOG) 0.1 % ointment AAA lower legs bid - occlude qhs 454 g 3    venlafaxine (EFFEXOR) 75 MG tablet Take 2 tablets (150 mg total) by mouth 2 (two) times daily. 360 tablet 1    vitamin D 1000 units Tab Take 1 tablet (1,000 Units total) by mouth once daily.      nitroGLYCERIN (NITROSTAT) 0.4 MG SL tablet Place 1 tablet (0.4 mg total) under the tongue every 5 (five) minutes as needed for Chest pain. 25 tablet 11       Objective Findings:    Vital Signs:  BP (!) 128/56   Pulse 83   Ht 6' 1" (1.854 m)   Wt (!) 151.9 kg (334 lb 14.1 oz)   BMI 44.18 kg/m²   Body mass index is 44.18 kg/m².    Physical Exam:  General Appearance: Well appearing in no acute distress  Head:   Normocephalic, without obvious abnormality  Eyes:    No scleral icterus, EOMI  ENT: Neck supple, Lips, mucosa, and tongue normal; teeth and gums normal  Lungs: CTA bilaterally in anterior and posterior fields, no wheezes, no crackles.  Heart:  Regular rate and rhythm, S1, S2 normal, no murmurs heard  Abdomen: Soft, non tender, non distended with positive bowel sounds in all four quadrants. No hepatosplenomegaly, ascites, or mass  Extremities: 2+ pulses, no clubbing, cyanosis or edema  Skin: No rash  Neurologic: CN II-XII intact      Labs:  Lab Results   Component Value Date    WBC 11.86 11/23/2020    HGB 11.6 (L) 11/23/2020    HCT 35.2 (L) 11/23/2020     11/23/2020    CHOL 168 02/11/2020    TRIG 150 02/11/2020    HDL 34 (L) 02/11/2020    ALT 17 08/24/2020    AST 13 08/24/2020     11/23/2020    K 4.5 11/23/2020     11/23/2020    CREATININE 1.5 (H) 11/23/2020    BUN 26 (H) 11/23/2020    CO2 25 11/23/2020    TSH 6.157 (H) 03/18/2017    PSA 7.0 (H) 04/28/2017    INR 1.0 01/28/2020    HGBA1C 5.6 02/11/2020     Endoscopy:    EGD/Colonoscopy 7/23/2020  Findings:        The Z-line was irregular and was found 47 cm from the incisors.        Biopsies were taken with a " cold forceps for histology. Estimated        blood loss was minimal.        The entire examined stomach was normal. Biopsies were taken with a        cold forceps for Helicobacter pylori testing. Estimated blood loss        was minimal.        The examined duodenum was normal.   Impression:           - Z-line irregular, 47 cm from the incisors,                         biopsied.                         - Normal stomach, biopsied.     Findings:        The perianal and digital rectal examinations were normal.        The colon (entire examined portion) appeared normal.   Impression:           - The entire examined colon is normal.     1.  Stomach, biopsy:   Antral and oxyntic type gastric mucosa with mild chronic gastritis   No Helicobacter pylori organisms identified on immunohistochemical stain with   appropriate controls   2.  Esophagus, biopsy:   Glandular mucosa with moderate chronic inflammation   No Helicobacter pylori organisms identified on immunohistochemical stain with   appropriate controls   No squamous component identified   Negative for intestinal metaplasia, dysplasia, or malignancy     Assessment:  1. Bloating    2. Early satiety    3. Abdominal fullness      In summary, the patient is a 75-year-old man who presents to the GI clinic in the setting of abdominal bloating and early satiety.    The patient was previously seen by my colleague Dr. Lackey for similar complaints and underwent an upper endoscopy and colonoscopy.  His upper endoscopy was relatively unremarkable and gastric biopsies were unrevealing of any significant pathology.  For brief review of the patient's diet I suspect that his symptoms are most likely related to he is eating.  I provided him with a dietary handout for guidance that may reduced bloating.    Additionally, I recommended he be checked for celiac disease though I suspect this is unlikely given his lack of associated GI symptoms.  As well, given his chronic PPI use I will  arrange for a breath test to assess for SIBO.     Follow up if symptoms worsen or fail to improve.      Order summary:  Orders Placed This Encounter    Celiac Disease Panel         Thank you so much for allowing me to participate in the care of Janusz Menchaca MD

## 2021-01-01 ENCOUNTER — HOSPITAL ENCOUNTER (OUTPATIENT)
Dept: CARDIOLOGY | Facility: HOSPITAL | Age: 76
Discharge: HOME | End: 2021-12-01
Attending: SURGERY
Payer: MEDICARE

## 2021-01-01 ENCOUNTER — HOSPITAL ENCOUNTER (OUTPATIENT)
Dept: CARDIOLOGY | Facility: CLINIC | Age: 76
Discharge: HOME | End: 2021-12-01
Payer: MEDICARE

## 2021-01-01 ENCOUNTER — OFFICE VISIT (OUTPATIENT)
Dept: PODIATRY | Facility: CLINIC | Age: 76
End: 2021-01-01
Payer: MEDICARE

## 2021-01-01 ENCOUNTER — PATIENT MESSAGE (OUTPATIENT)
Dept: ADMINISTRATIVE | Facility: OTHER | Age: 76
End: 2021-01-01
Payer: MEDICARE

## 2021-01-01 ENCOUNTER — OFFICE VISIT (OUTPATIENT)
Dept: ELECTROPHYSIOLOGY | Facility: CLINIC | Age: 76
End: 2021-01-01
Payer: MEDICARE

## 2021-01-01 ENCOUNTER — PATIENT MESSAGE (OUTPATIENT)
Dept: PODIATRY | Facility: CLINIC | Age: 76
End: 2021-01-01
Payer: MEDICARE

## 2021-01-01 ENCOUNTER — IMMUNIZATION (OUTPATIENT)
Dept: OBSTETRICS AND GYNECOLOGY | Facility: CLINIC | Age: 76
End: 2021-01-01
Payer: MEDICARE

## 2021-01-01 ENCOUNTER — IMMUNIZATION (OUTPATIENT)
Dept: PHARMACY | Facility: CLINIC | Age: 76
End: 2021-01-01
Payer: MEDICARE

## 2021-01-01 ENCOUNTER — NUTRITION (OUTPATIENT)
Dept: NEPHROLOGY | Facility: CLINIC | Age: 76
End: 2021-01-01
Payer: MEDICARE

## 2021-01-01 ENCOUNTER — TELEPHONE (OUTPATIENT)
Dept: FAMILY MEDICINE | Facility: CLINIC | Age: 76
End: 2021-01-01
Payer: MEDICARE

## 2021-01-01 ENCOUNTER — PATIENT MESSAGE (OUTPATIENT)
Dept: ADMINISTRATIVE | Facility: HOSPITAL | Age: 76
End: 2021-01-01
Payer: MEDICARE

## 2021-01-01 ENCOUNTER — OFFICE VISIT (OUTPATIENT)
Dept: NEPHROLOGY | Facility: CLINIC | Age: 76
End: 2021-01-01
Payer: MEDICARE

## 2021-01-01 ENCOUNTER — OFFICE VISIT (OUTPATIENT)
Dept: VASCULAR SURGERY | Facility: CLINIC | Age: 76
End: 2021-01-01
Payer: MEDICARE

## 2021-01-01 ENCOUNTER — TELEPHONE (OUTPATIENT)
Dept: NEPHROLOGY | Facility: CLINIC | Age: 76
End: 2021-01-01
Payer: MEDICARE

## 2021-01-01 ENCOUNTER — PATIENT OUTREACH (OUTPATIENT)
Dept: ADMINISTRATIVE | Facility: OTHER | Age: 76
End: 2021-01-01
Payer: MEDICARE

## 2021-01-01 ENCOUNTER — RESEARCH ENCOUNTER (OUTPATIENT)
Dept: RESEARCH | Facility: HOSPITAL | Age: 76
End: 2021-01-01
Payer: MEDICARE

## 2021-01-01 VITALS
BODY MASS INDEX: 44.44 KG/M2 | DIASTOLIC BLOOD PRESSURE: 51 MMHG | HEART RATE: 79 BPM | WEIGHT: 315 LBS | SYSTOLIC BLOOD PRESSURE: 123 MMHG

## 2021-01-01 VITALS
SYSTOLIC BLOOD PRESSURE: 102 MMHG | HEART RATE: 78 BPM | HEIGHT: 73 IN | BODY MASS INDEX: 41.75 KG/M2 | WEIGHT: 315 LBS | DIASTOLIC BLOOD PRESSURE: 47 MMHG

## 2021-01-01 VITALS — HEIGHT: 73 IN | BODY MASS INDEX: 41.75 KG/M2 | WEIGHT: 315 LBS

## 2021-01-01 VITALS
WEIGHT: 315 LBS | DIASTOLIC BLOOD PRESSURE: 70 MMHG | BODY MASS INDEX: 41.75 KG/M2 | BODY MASS INDEX: 41.75 KG/M2 | SYSTOLIC BLOOD PRESSURE: 140 MMHG | OXYGEN SATURATION: 96 % | WEIGHT: 315 LBS | HEART RATE: 82 BPM | HEIGHT: 73 IN | HEIGHT: 73 IN

## 2021-01-01 DIAGNOSIS — L90.9 PLANTAR FAT PAD ATROPHY: ICD-10-CM

## 2021-01-01 DIAGNOSIS — N18.31 CHRONIC KIDNEY DISEASE, STAGE 3A: ICD-10-CM

## 2021-01-01 DIAGNOSIS — I10 HYPERTENSION, UNSPECIFIED TYPE: ICD-10-CM

## 2021-01-01 DIAGNOSIS — N18.30 STAGE 3 CHRONIC KIDNEY DISEASE, UNSPECIFIED WHETHER STAGE 3A OR 3B CKD: Primary | ICD-10-CM

## 2021-01-01 DIAGNOSIS — R60.0 BILATERAL LEG EDEMA: ICD-10-CM

## 2021-01-01 DIAGNOSIS — I49.8 OTHER SPECIFIED CARDIAC ARRHYTHMIAS: ICD-10-CM

## 2021-01-01 DIAGNOSIS — Z86.39 HISTORY OF HYPERKALEMIA: ICD-10-CM

## 2021-01-01 DIAGNOSIS — I70.248 ATHEROSCLEROSIS OF NATIVE ARTERY OF LEFT LOWER EXTREMITY WITH ULCERATION OF OTHER PART OF LOWER LEG: Primary | ICD-10-CM

## 2021-01-01 DIAGNOSIS — I87.8 VENOUS STASIS OF LOWER EXTREMITY: ICD-10-CM

## 2021-01-01 DIAGNOSIS — E83.39 HYPOPHOSPHATEMIA: ICD-10-CM

## 2021-01-01 DIAGNOSIS — R26.89 LOSS OF BALANCE: ICD-10-CM

## 2021-01-01 DIAGNOSIS — I70.248 ATHEROSCLEROSIS OF NATIVE ARTERY OF LEFT LOWER EXTREMITY WITH ULCERATION OF OTHER PART OF LOWER LEG: ICD-10-CM

## 2021-01-01 DIAGNOSIS — L85.3 XEROSIS CUTIS: ICD-10-CM

## 2021-01-01 DIAGNOSIS — I12.9 BENIGN HYPERTENSION WITH CHRONIC KIDNEY DISEASE, STAGE III: ICD-10-CM

## 2021-01-01 DIAGNOSIS — N18.30 BENIGN HYPERTENSION WITH CHRONIC KIDNEY DISEASE, STAGE III: ICD-10-CM

## 2021-01-01 DIAGNOSIS — I73.9 PVD (PERIPHERAL VASCULAR DISEASE): Primary | ICD-10-CM

## 2021-01-01 DIAGNOSIS — Z87.2 HEALED ULCER OF LEFT FOOT ON EXAMINATION: ICD-10-CM

## 2021-01-01 DIAGNOSIS — L84 PRE-ULCERATIVE CALLUSES: ICD-10-CM

## 2021-01-01 DIAGNOSIS — I87.2 VENOUS STASIS DERMATITIS OF BOTH LOWER EXTREMITIES: ICD-10-CM

## 2021-01-01 DIAGNOSIS — M25.571 PAIN IN JOINTS OF BOTH FEET: ICD-10-CM

## 2021-01-01 DIAGNOSIS — N18.32 STAGE 3B CHRONIC KIDNEY DISEASE: ICD-10-CM

## 2021-01-01 DIAGNOSIS — L97.522 TOE ULCER, LEFT, WITH FAT LAYER EXPOSED: ICD-10-CM

## 2021-01-01 DIAGNOSIS — Z23 NEED FOR VACCINATION: Primary | ICD-10-CM

## 2021-01-01 DIAGNOSIS — G47.33 OSA ON CPAP: ICD-10-CM

## 2021-01-01 DIAGNOSIS — E21.3 HYPERPARATHYROIDISM: ICD-10-CM

## 2021-01-01 DIAGNOSIS — Z98.890 HISTORY OF LOOP RECORDER: ICD-10-CM

## 2021-01-01 DIAGNOSIS — M25.572 PAIN IN JOINTS OF BOTH FEET: ICD-10-CM

## 2021-01-01 DIAGNOSIS — I48.0 PAROXYSMAL ATRIAL FIBRILLATION: Primary | ICD-10-CM

## 2021-01-01 LAB
LEFT ABI: 0.52
LEFT ABI: 0.71
LEFT ARM BP: 141 MMHG
LEFT ARM BP: 146 MMHG
LEFT DORSALIS PEDIS: 101 MMHG
LEFT DORSALIS PEDIS: 61 MMHG
LEFT POSTERIOR TIBIAL: 76 MMHG
LEFT POSTERIOR TIBIAL: 98 MMHG
LEFT TBI: 0.17
LEFT TBI: 0.3
LEFT TOE PRESSURE: 25 MMHG
LEFT TOE PRESSURE: 42 MMHG
RIGHT ABI: 0.58
RIGHT ABI: 0.66
RIGHT ARM BP: 138 MMHG
RIGHT ARM BP: 142 MMHG
RIGHT DORSALIS PEDIS: 70 MMHG
RIGHT DORSALIS PEDIS: 82 MMHG
RIGHT POSTERIOR TIBIAL: 66 MMHG
RIGHT POSTERIOR TIBIAL: 97 MMHG
RIGHT TBI: 0.23
RIGHT TBI: 0.39
RIGHT TOE PRESSURE: 34 MMHG
RIGHT TOE PRESSURE: 55 MMHG

## 2021-01-01 PROCEDURE — 3008F BODY MASS INDEX DOCD: CPT | Mod: CPTII,S$GLB,, | Performed by: PODIATRIST

## 2021-01-01 PROCEDURE — 0004A COVID-19, MRNA, LNP-S, PF, 30 MCG/0.3 ML DOSE VACCINE: CPT | Mod: PBBFAC | Performed by: FAMILY MEDICINE

## 2021-01-01 PROCEDURE — 93005 RHYTHM STRIP: ICD-10-PCS | Mod: S$GLB,,, | Performed by: INTERNAL MEDICINE

## 2021-01-01 PROCEDURE — 1126F PR PAIN SEVERITY QUANTIFIED, NO PAIN PRESENT: ICD-10-PCS | Mod: CPTII,S$GLB,, | Performed by: PODIATRIST

## 2021-01-01 PROCEDURE — 3008F PR BODY MASS INDEX (BMI) DOCUMENTED: ICD-10-PCS | Mod: CPTII,S$GLB,, | Performed by: PODIATRIST

## 2021-01-01 PROCEDURE — 99214 PR OFFICE/OUTPT VISIT, EST, LEVL IV, 30-39 MIN: ICD-10-PCS | Mod: S$GLB,,, | Performed by: NURSE PRACTITIONER

## 2021-01-01 PROCEDURE — 3288F FALL RISK ASSESSMENT DOCD: CPT | Mod: CPTII,S$GLB,, | Performed by: PODIATRIST

## 2021-01-01 PROCEDURE — 99999 PR PBB SHADOW E&M-EST. PATIENT-LVL V: CPT | Mod: PBBFAC,,, | Performed by: PODIATRIST

## 2021-01-01 PROCEDURE — 1157F PR ADVANCE CARE PLAN OR EQUIV PRESENT IN MEDICAL RECORD: ICD-10-PCS | Mod: CPTII,S$GLB,, | Performed by: PODIATRIST

## 2021-01-01 PROCEDURE — 99999 PR PBB SHADOW E&M-EST. PATIENT-LVL V: ICD-10-PCS | Mod: PBBFAC,,, | Performed by: NURSE PRACTITIONER

## 2021-01-01 PROCEDURE — 99213 PR OFFICE/OUTPT VISIT, EST, LEVL III, 20-29 MIN: ICD-10-PCS | Mod: S$GLB,,, | Performed by: PODIATRIST

## 2021-01-01 PROCEDURE — 93922 ANKLE BRACHIAL INDICES (ABI): ICD-10-PCS | Mod: 26,,, | Performed by: INTERNAL MEDICINE

## 2021-01-01 PROCEDURE — 99214 PR OFFICE/OUTPT VISIT, EST, LEVL IV, 30-39 MIN: ICD-10-PCS | Mod: S$GLB,,, | Performed by: PODIATRIST

## 2021-01-01 PROCEDURE — 1159F MED LIST DOCD IN RCRD: CPT | Mod: CPTII,S$GLB,, | Performed by: PODIATRIST

## 2021-01-01 PROCEDURE — 1157F ADVNC CARE PLAN IN RCRD: CPT | Mod: CPTII,S$GLB,, | Performed by: SURGERY

## 2021-01-01 PROCEDURE — 1160F PR REVIEW ALL MEDS BY PRESCRIBER/CLIN PHARMACIST DOCUMENTED: ICD-10-PCS | Mod: CPTII,S$GLB,, | Performed by: PODIATRIST

## 2021-01-01 PROCEDURE — 99213 OFFICE O/P EST LOW 20 MIN: CPT | Mod: S$GLB,,, | Performed by: PODIATRIST

## 2021-01-01 PROCEDURE — 3066F PR DOCUMENTATION OF TREATMENT FOR NEPHROPATHY: ICD-10-PCS | Mod: CPTII,S$GLB,, | Performed by: PODIATRIST

## 2021-01-01 PROCEDURE — 1101F PT FALLS ASSESS-DOCD LE1/YR: CPT | Mod: CPTII,S$GLB,, | Performed by: PODIATRIST

## 2021-01-01 PROCEDURE — 3066F NEPHROPATHY DOC TX: CPT | Mod: CPTII,S$GLB,, | Performed by: PODIATRIST

## 2021-01-01 PROCEDURE — 3288F PR FALLS RISK ASSESSMENT DOCUMENTED: ICD-10-PCS | Mod: CPTII,S$GLB,, | Performed by: PODIATRIST

## 2021-01-01 PROCEDURE — 99499 RISK ADDL DX/OHS AUDIT: ICD-10-PCS | Mod: S$GLB,,, | Performed by: NURSE PRACTITIONER

## 2021-01-01 PROCEDURE — 3066F NEPHROPATHY DOC TX: CPT | Mod: CPTII,S$GLB,, | Performed by: NURSE PRACTITIONER

## 2021-01-01 PROCEDURE — 4010F PR ACE/ARB THEARPY RXD/TAKEN: ICD-10-PCS | Mod: CPTII,S$GLB,, | Performed by: NURSE PRACTITIONER

## 2021-01-01 PROCEDURE — 1159F PR MEDICATION LIST DOCUMENTED IN MEDICAL RECORD: ICD-10-PCS | Mod: CPTII,S$GLB,, | Performed by: PODIATRIST

## 2021-01-01 PROCEDURE — 99214 OFFICE O/P EST MOD 30 MIN: CPT | Mod: S$GLB,,, | Performed by: NURSE PRACTITIONER

## 2021-01-01 PROCEDURE — 1159F MED LIST DOCD IN RCRD: CPT | Mod: CPTII,S$GLB,, | Performed by: SURGERY

## 2021-01-01 PROCEDURE — 3066F PR DOCUMENTATION OF TREATMENT FOR NEPHROPATHY: ICD-10-PCS | Mod: CPTII,S$GLB,, | Performed by: SURGERY

## 2021-01-01 PROCEDURE — 93922 UPR/L XTREMITY ART 2 LEVELS: CPT

## 2021-01-01 PROCEDURE — 1157F ADVNC CARE PLAN IN RCRD: CPT | Mod: CPTII,S$GLB,, | Performed by: NURSE PRACTITIONER

## 2021-01-01 PROCEDURE — 99999 PR PBB SHADOW E&M-EST. PATIENT-LVL IV: ICD-10-PCS | Mod: PBBFAC,,,

## 2021-01-01 PROCEDURE — 99499 UNLISTED E&M SERVICE: CPT | Mod: S$GLB,,, | Performed by: NURSE PRACTITIONER

## 2021-01-01 PROCEDURE — 1101F PR PT FALLS ASSESS DOC 0-1 FALLS W/OUT INJ PAST YR: ICD-10-PCS | Mod: CPTII,S$GLB,, | Performed by: SURGERY

## 2021-01-01 PROCEDURE — 1101F PR PT FALLS ASSESS DOC 0-1 FALLS W/OUT INJ PAST YR: ICD-10-PCS | Mod: CPTII,S$GLB,, | Performed by: PODIATRIST

## 2021-01-01 PROCEDURE — 1125F AMNT PAIN NOTED PAIN PRSNT: CPT | Mod: CPTII,S$GLB,, | Performed by: PODIATRIST

## 2021-01-01 PROCEDURE — 1101F PT FALLS ASSESS-DOCD LE1/YR: CPT | Mod: CPTII,S$GLB,, | Performed by: SURGERY

## 2021-01-01 PROCEDURE — 99999 PR PBB SHADOW E&M-EST. PATIENT-LVL V: ICD-10-PCS | Mod: PBBFAC,,, | Performed by: SURGERY

## 2021-01-01 PROCEDURE — 99215 PR OFFICE/OUTPT VISIT, EST, LEVL V, 40-54 MIN: ICD-10-PCS | Mod: S$GLB,,, | Performed by: SURGERY

## 2021-01-01 PROCEDURE — 3008F PR BODY MASS INDEX (BMI) DOCUMENTED: ICD-10-PCS | Mod: CPTII,S$GLB,, | Performed by: SURGERY

## 2021-01-01 PROCEDURE — 99999 PR PBB SHADOW E&M-EST. PATIENT-LVL V: CPT | Mod: PBBFAC,,, | Performed by: NURSE PRACTITIONER

## 2021-01-01 PROCEDURE — 1126F PR PAIN SEVERITY QUANTIFIED, NO PAIN PRESENT: ICD-10-PCS | Mod: CPTII,S$GLB,, | Performed by: SURGERY

## 2021-01-01 PROCEDURE — 3074F SYST BP LT 130 MM HG: CPT | Mod: CPTII,S$GLB,, | Performed by: SURGERY

## 2021-01-01 PROCEDURE — 93922 UPR/L XTREMITY ART 2 LEVELS: CPT | Mod: 26,,, | Performed by: INTERNAL MEDICINE

## 2021-01-01 PROCEDURE — 1157F ADVNC CARE PLAN IN RCRD: CPT | Mod: CPTII,S$GLB,, | Performed by: PODIATRIST

## 2021-01-01 PROCEDURE — 3288F PR FALLS RISK ASSESSMENT DOCUMENTED: ICD-10-PCS | Mod: CPTII,S$GLB,, | Performed by: SURGERY

## 2021-01-01 PROCEDURE — 3078F DIAST BP <80 MM HG: CPT | Mod: CPTII,S$GLB,, | Performed by: SURGERY

## 2021-01-01 PROCEDURE — 97802 MEDICAL NUTRITION INDIV IN: CPT | Mod: S$GLB,,,

## 2021-01-01 PROCEDURE — 99999 PR PBB SHADOW E&M-EST. PATIENT-LVL IV: CPT | Mod: PBBFAC,,,

## 2021-01-01 PROCEDURE — 1125F PR PAIN SEVERITY QUANTIFIED, PAIN PRESENT: ICD-10-PCS | Mod: CPTII,S$GLB,, | Performed by: PODIATRIST

## 2021-01-01 PROCEDURE — 1157F PR ADVANCE CARE PLAN OR EQUIV PRESENT IN MEDICAL RECORD: ICD-10-PCS | Mod: CPTII,S$GLB,, | Performed by: NURSE PRACTITIONER

## 2021-01-01 PROCEDURE — 1157F PR ADVANCE CARE PLAN OR EQUIV PRESENT IN MEDICAL RECORD: ICD-10-PCS | Mod: CPTII,S$GLB,, | Performed by: SURGERY

## 2021-01-01 PROCEDURE — 3078F PR MOST RECENT DIASTOLIC BLOOD PRESSURE < 80 MM HG: ICD-10-PCS | Mod: CPTII,S$GLB,, | Performed by: SURGERY

## 2021-01-01 PROCEDURE — 3074F PR MOST RECENT SYSTOLIC BLOOD PRESSURE < 130 MM HG: ICD-10-PCS | Mod: CPTII,S$GLB,, | Performed by: SURGERY

## 2021-01-01 PROCEDURE — 1160F RVW MEDS BY RX/DR IN RCRD: CPT | Mod: CPTII,S$GLB,, | Performed by: PODIATRIST

## 2021-01-01 PROCEDURE — 3066F PR DOCUMENTATION OF TREATMENT FOR NEPHROPATHY: ICD-10-PCS | Mod: CPTII,S$GLB,, | Performed by: NURSE PRACTITIONER

## 2021-01-01 PROCEDURE — 99999 PR PBB SHADOW E&M-EST. PATIENT-LVL V: ICD-10-PCS | Mod: PBBFAC,,, | Performed by: PODIATRIST

## 2021-01-01 PROCEDURE — 97802 PR MED NUTR THER, 1ST, INDIV, EA 15 MIN: ICD-10-PCS | Mod: S$GLB,,,

## 2021-01-01 PROCEDURE — 93010 RHYTHM STRIP: ICD-10-PCS | Mod: S$GLB,,, | Performed by: INTERNAL MEDICINE

## 2021-01-01 PROCEDURE — 1126F AMNT PAIN NOTED NONE PRSNT: CPT | Mod: CPTII,S$GLB,, | Performed by: SURGERY

## 2021-01-01 PROCEDURE — 99215 OFFICE O/P EST HI 40 MIN: CPT | Mod: S$GLB,,, | Performed by: SURGERY

## 2021-01-01 PROCEDURE — 3288F FALL RISK ASSESSMENT DOCD: CPT | Mod: CPTII,S$GLB,, | Performed by: SURGERY

## 2021-01-01 PROCEDURE — 93005 ELECTROCARDIOGRAM TRACING: CPT | Mod: S$GLB,,, | Performed by: INTERNAL MEDICINE

## 2021-01-01 PROCEDURE — 3066F NEPHROPATHY DOC TX: CPT | Mod: CPTII,S$GLB,, | Performed by: SURGERY

## 2021-01-01 PROCEDURE — 99214 OFFICE O/P EST MOD 30 MIN: CPT | Mod: S$GLB,,, | Performed by: PODIATRIST

## 2021-01-01 PROCEDURE — 1159F PR MEDICATION LIST DOCUMENTED IN MEDICAL RECORD: ICD-10-PCS | Mod: CPTII,S$GLB,, | Performed by: SURGERY

## 2021-01-01 PROCEDURE — 4010F ACE/ARB THERAPY RXD/TAKEN: CPT | Mod: CPTII,S$GLB,, | Performed by: NURSE PRACTITIONER

## 2021-01-01 PROCEDURE — 1126F AMNT PAIN NOTED NONE PRSNT: CPT | Mod: CPTII,S$GLB,, | Performed by: PODIATRIST

## 2021-01-01 PROCEDURE — 99999 PR PBB SHADOW E&M-EST. PATIENT-LVL V: CPT | Mod: PBBFAC,,, | Performed by: SURGERY

## 2021-01-01 PROCEDURE — 3008F BODY MASS INDEX DOCD: CPT | Mod: CPTII,S$GLB,, | Performed by: SURGERY

## 2021-01-01 PROCEDURE — 93010 ELECTROCARDIOGRAM REPORT: CPT | Mod: S$GLB,,, | Performed by: INTERNAL MEDICINE

## 2021-01-01 RX ORDER — OLMESARTAN MEDOXOMIL 5 MG/1
5 TABLET ORAL DAILY
Qty: 90 TABLET | Refills: 3 | Status: ON HOLD | OUTPATIENT
Start: 2021-01-01 | End: 2022-01-01 | Stop reason: SDUPTHER

## 2021-01-04 LAB
GLIADIN PEPTIDE IGA SER-ACNC: 5 UNITS
GLIADIN PEPTIDE IGG SER-ACNC: 2 UNITS
IGA SERPL-MCNC: 260 MG/DL (ref 70–400)
TTG IGA SER-ACNC: 6 UNITS
TTG IGG SER-ACNC: 3 UNITS

## 2021-01-05 ENCOUNTER — PATIENT MESSAGE (OUTPATIENT)
Dept: OPHTHALMOLOGY | Facility: CLINIC | Age: 76
End: 2021-01-05

## 2021-01-07 ENCOUNTER — IMMUNIZATION (OUTPATIENT)
Dept: PRIMARY CARE CLINIC | Facility: CLINIC | Age: 76
End: 2021-01-07
Payer: MEDICARE

## 2021-01-07 DIAGNOSIS — Z23 NEED FOR VACCINATION: ICD-10-CM

## 2021-01-07 PROCEDURE — 91300 COVID-19, MRNA, LNP-S, PF, 30 MCG/0.3 ML DOSE VACCINE: CPT | Mod: PBBFAC | Performed by: FAMILY MEDICINE

## 2021-01-13 ENCOUNTER — PATIENT MESSAGE (OUTPATIENT)
Dept: GASTROENTEROLOGY | Facility: CLINIC | Age: 76
End: 2021-01-13

## 2021-01-13 ENCOUNTER — PATIENT MESSAGE (OUTPATIENT)
Dept: FAMILY MEDICINE | Facility: CLINIC | Age: 76
End: 2021-01-13

## 2021-01-13 RX ORDER — METOPROLOL SUCCINATE 25 MG/1
25 TABLET, EXTENDED RELEASE ORAL DAILY
Qty: 90 TABLET | Refills: 0 | Status: SHIPPED | OUTPATIENT
Start: 2021-01-13 | End: 2022-01-01

## 2021-01-20 ENCOUNTER — TELEPHONE (OUTPATIENT)
Dept: FAMILY MEDICINE | Facility: CLINIC | Age: 76
End: 2021-01-20

## 2021-01-20 ENCOUNTER — TELEPHONE (OUTPATIENT)
Dept: ENDOSCOPY | Facility: HOSPITAL | Age: 76
End: 2021-01-20

## 2021-01-26 ENCOUNTER — OFFICE VISIT (OUTPATIENT)
Dept: OPHTHALMOLOGY | Facility: CLINIC | Age: 76
End: 2021-01-26
Payer: MEDICARE

## 2021-01-26 DIAGNOSIS — Z98.890 POST-OPERATIVE STATE: Primary | ICD-10-CM

## 2021-01-26 DIAGNOSIS — C44.111 BASAL CELL CARCINOMA (BCC) OF MEDIAL CANTHUS OF LEFT EYE: ICD-10-CM

## 2021-01-26 PROCEDURE — 1159F MED LIST DOCD IN RCRD: CPT | Mod: CPTII,S$GLB,, | Performed by: OPHTHALMOLOGY

## 2021-01-26 PROCEDURE — 1126F PR PAIN SEVERITY QUANTIFIED, NO PAIN PRESENT: ICD-10-PCS | Mod: CPTII,S$GLB,, | Performed by: OPHTHALMOLOGY

## 2021-01-26 PROCEDURE — 1101F PR PT FALLS ASSESS DOC 0-1 FALLS W/OUT INJ PAST YR: ICD-10-PCS | Mod: CPTII,S$GLB,, | Performed by: OPHTHALMOLOGY

## 2021-01-26 PROCEDURE — 3066F PR DOCUMENTATION OF TREATMENT FOR NEPHROPATHY: ICD-10-PCS | Mod: CPTII,S$GLB,, | Performed by: OPHTHALMOLOGY

## 2021-01-26 PROCEDURE — 3288F FALL RISK ASSESSMENT DOCD: CPT | Mod: CPTII,S$GLB,, | Performed by: OPHTHALMOLOGY

## 2021-01-26 PROCEDURE — 99024 PR POST-OP FOLLOW-UP VISIT: ICD-10-PCS | Mod: S$GLB,,, | Performed by: OPHTHALMOLOGY

## 2021-01-26 PROCEDURE — 99999 PR PBB SHADOW E&M-EST. PATIENT-LVL IV: ICD-10-PCS | Mod: PBBFAC,,, | Performed by: OPHTHALMOLOGY

## 2021-01-26 PROCEDURE — 1160F RVW MEDS BY RX/DR IN RCRD: CPT | Mod: CPTII,S$GLB,, | Performed by: OPHTHALMOLOGY

## 2021-01-26 PROCEDURE — 4010F ACE/ARB THERAPY RXD/TAKEN: CPT | Mod: CPTII,S$GLB,, | Performed by: OPHTHALMOLOGY

## 2021-01-26 PROCEDURE — 1157F ADVNC CARE PLAN IN RCRD: CPT | Mod: CPTII,S$GLB,, | Performed by: OPHTHALMOLOGY

## 2021-01-26 PROCEDURE — 1160F PR REVIEW ALL MEDS BY PRESCRIBER/CLIN PHARMACIST DOCUMENTED: ICD-10-PCS | Mod: CPTII,S$GLB,, | Performed by: OPHTHALMOLOGY

## 2021-01-26 PROCEDURE — 1126F AMNT PAIN NOTED NONE PRSNT: CPT | Mod: CPTII,S$GLB,, | Performed by: OPHTHALMOLOGY

## 2021-01-26 PROCEDURE — 99999 PR PBB SHADOW E&M-EST. PATIENT-LVL IV: CPT | Mod: PBBFAC,,, | Performed by: OPHTHALMOLOGY

## 2021-01-26 PROCEDURE — 3288F PR FALLS RISK ASSESSMENT DOCUMENTED: ICD-10-PCS | Mod: CPTII,S$GLB,, | Performed by: OPHTHALMOLOGY

## 2021-01-26 PROCEDURE — 1159F PR MEDICATION LIST DOCUMENTED IN MEDICAL RECORD: ICD-10-PCS | Mod: CPTII,S$GLB,, | Performed by: OPHTHALMOLOGY

## 2021-01-26 PROCEDURE — 4010F PR ACE/ARB THEARPY RXD/TAKEN: ICD-10-PCS | Mod: CPTII,S$GLB,, | Performed by: OPHTHALMOLOGY

## 2021-01-26 PROCEDURE — 1157F PR ADVANCE CARE PLAN OR EQUIV PRESENT IN MEDICAL RECORD: ICD-10-PCS | Mod: CPTII,S$GLB,, | Performed by: OPHTHALMOLOGY

## 2021-01-26 PROCEDURE — 99024 POSTOP FOLLOW-UP VISIT: CPT | Mod: S$GLB,,, | Performed by: OPHTHALMOLOGY

## 2021-01-26 PROCEDURE — 1101F PT FALLS ASSESS-DOCD LE1/YR: CPT | Mod: CPTII,S$GLB,, | Performed by: OPHTHALMOLOGY

## 2021-01-26 PROCEDURE — 3066F NEPHROPATHY DOC TX: CPT | Mod: CPTII,S$GLB,, | Performed by: OPHTHALMOLOGY

## 2021-01-28 ENCOUNTER — IMMUNIZATION (OUTPATIENT)
Dept: PRIMARY CARE CLINIC | Facility: CLINIC | Age: 76
End: 2021-01-28
Payer: MEDICARE

## 2021-01-28 DIAGNOSIS — Z23 NEED FOR VACCINATION: Primary | ICD-10-CM

## 2021-01-28 PROCEDURE — 0002A COVID-19, MRNA, LNP-S, PF, 30 MCG/0.3 ML DOSE VACCINE: CPT | Mod: PBBFAC | Performed by: EMERGENCY MEDICINE

## 2021-01-28 PROCEDURE — 91300 COVID-19, MRNA, LNP-S, PF, 30 MCG/0.3 ML DOSE VACCINE: CPT | Mod: PBBFAC | Performed by: EMERGENCY MEDICINE

## 2021-02-02 ENCOUNTER — PATIENT OUTREACH (OUTPATIENT)
Dept: ADMINISTRATIVE | Facility: HOSPITAL | Age: 76
End: 2021-02-02

## 2021-02-10 ENCOUNTER — PATIENT MESSAGE (OUTPATIENT)
Dept: CARDIOLOGY | Facility: CLINIC | Age: 76
End: 2021-02-10

## 2021-02-10 PROBLEM — N18.9 ACUTE KIDNEY INJURY SUPERIMPOSED ON CHRONIC KIDNEY DISEASE: Status: RESOLVED | Noted: 2020-08-24 | Resolved: 2021-02-10

## 2021-02-10 PROBLEM — N17.9 ACUTE KIDNEY INJURY SUPERIMPOSED ON CHRONIC KIDNEY DISEASE: Status: RESOLVED | Noted: 2020-08-24 | Resolved: 2021-02-10

## 2021-02-11 ENCOUNTER — TELEPHONE (OUTPATIENT)
Dept: CARDIOLOGY | Facility: CLINIC | Age: 76
End: 2021-02-11

## 2021-02-17 ENCOUNTER — OFFICE VISIT (OUTPATIENT)
Dept: FAMILY MEDICINE | Facility: CLINIC | Age: 76
End: 2021-02-17
Payer: MEDICARE

## 2021-02-17 VITALS
DIASTOLIC BLOOD PRESSURE: 52 MMHG | HEART RATE: 68 BPM | TEMPERATURE: 98 F | SYSTOLIC BLOOD PRESSURE: 98 MMHG | WEIGHT: 315 LBS | HEIGHT: 73 IN | BODY MASS INDEX: 41.75 KG/M2 | OXYGEN SATURATION: 96 %

## 2021-02-17 DIAGNOSIS — N18.30 BENIGN HYPERTENSION WITH CHRONIC KIDNEY DISEASE, STAGE III: Primary | ICD-10-CM

## 2021-02-17 DIAGNOSIS — F33.0 MAJOR DEPRESSIVE DISORDER, RECURRENT EPISODE, MILD: ICD-10-CM

## 2021-02-17 DIAGNOSIS — I25.10 CORONARY ARTERY DISEASE INVOLVING NATIVE CORONARY ARTERY OF NATIVE HEART WITHOUT ANGINA PECTORIS: ICD-10-CM

## 2021-02-17 DIAGNOSIS — C61 PROSTATE CANCER: ICD-10-CM

## 2021-02-17 DIAGNOSIS — I87.2 VENOUS STASIS DERMATITIS, UNSPECIFIED LATERALITY: ICD-10-CM

## 2021-02-17 DIAGNOSIS — N25.81 SECONDARY HYPERPARATHYROIDISM OF RENAL ORIGIN: ICD-10-CM

## 2021-02-17 DIAGNOSIS — Z23 NEED FOR SHINGLES VACCINE: ICD-10-CM

## 2021-02-17 DIAGNOSIS — I65.23 BILATERAL CAROTID ARTERY STENOSIS: ICD-10-CM

## 2021-02-17 DIAGNOSIS — J98.4 SMALL AIRWAYS DISEASE: ICD-10-CM

## 2021-02-17 DIAGNOSIS — E66.01 MORBID OBESITY WITH BODY MASS INDEX OF 45.0-49.9 IN ADULT: ICD-10-CM

## 2021-02-17 DIAGNOSIS — J61 ASBESTOSIS: ICD-10-CM

## 2021-02-17 DIAGNOSIS — R60.0 BILATERAL LEG EDEMA: ICD-10-CM

## 2021-02-17 DIAGNOSIS — I48.0 PAROXYSMAL ATRIAL FIBRILLATION: ICD-10-CM

## 2021-02-17 DIAGNOSIS — G47.33 OSA ON CPAP: ICD-10-CM

## 2021-02-17 DIAGNOSIS — I12.9 BENIGN HYPERTENSION WITH CHRONIC KIDNEY DISEASE, STAGE III: Primary | ICD-10-CM

## 2021-02-17 DIAGNOSIS — F41.1 GENERALIZED ANXIETY DISORDER: ICD-10-CM

## 2021-02-17 PROCEDURE — 99499 UNLISTED E&M SERVICE: CPT | Mod: S$GLB,,, | Performed by: INTERNAL MEDICINE

## 2021-02-17 PROCEDURE — 1126F PR PAIN SEVERITY QUANTIFIED, NO PAIN PRESENT: ICD-10-PCS | Mod: S$GLB,,, | Performed by: INTERNAL MEDICINE

## 2021-02-17 PROCEDURE — 3008F BODY MASS INDEX DOCD: CPT | Mod: CPTII,S$GLB,, | Performed by: INTERNAL MEDICINE

## 2021-02-17 PROCEDURE — 99214 OFFICE O/P EST MOD 30 MIN: CPT | Mod: S$GLB,,, | Performed by: INTERNAL MEDICINE

## 2021-02-17 PROCEDURE — 99999 PR PBB SHADOW E&M-EST. PATIENT-LVL V: ICD-10-PCS | Mod: PBBFAC,,, | Performed by: INTERNAL MEDICINE

## 2021-02-17 PROCEDURE — 3288F PR FALLS RISK ASSESSMENT DOCUMENTED: ICD-10-PCS | Mod: CPTII,S$GLB,, | Performed by: INTERNAL MEDICINE

## 2021-02-17 PROCEDURE — 3288F FALL RISK ASSESSMENT DOCD: CPT | Mod: CPTII,S$GLB,, | Performed by: INTERNAL MEDICINE

## 2021-02-17 PROCEDURE — 1101F PT FALLS ASSESS-DOCD LE1/YR: CPT | Mod: CPTII,S$GLB,, | Performed by: INTERNAL MEDICINE

## 2021-02-17 PROCEDURE — 3078F PR MOST RECENT DIASTOLIC BLOOD PRESSURE < 80 MM HG: ICD-10-PCS | Mod: CPTII,S$GLB,, | Performed by: INTERNAL MEDICINE

## 2021-02-17 PROCEDURE — 99499 RISK ADDL DX/OHS AUDIT: ICD-10-PCS | Mod: S$GLB,,, | Performed by: INTERNAL MEDICINE

## 2021-02-17 PROCEDURE — 3078F DIAST BP <80 MM HG: CPT | Mod: CPTII,S$GLB,, | Performed by: INTERNAL MEDICINE

## 2021-02-17 PROCEDURE — 1159F PR MEDICATION LIST DOCUMENTED IN MEDICAL RECORD: ICD-10-PCS | Mod: S$GLB,,, | Performed by: INTERNAL MEDICINE

## 2021-02-17 PROCEDURE — 99999 PR PBB SHADOW E&M-EST. PATIENT-LVL V: CPT | Mod: PBBFAC,,, | Performed by: INTERNAL MEDICINE

## 2021-02-17 PROCEDURE — 1159F MED LIST DOCD IN RCRD: CPT | Mod: S$GLB,,, | Performed by: INTERNAL MEDICINE

## 2021-02-17 PROCEDURE — 1101F PR PT FALLS ASSESS DOC 0-1 FALLS W/OUT INJ PAST YR: ICD-10-PCS | Mod: CPTII,S$GLB,, | Performed by: INTERNAL MEDICINE

## 2021-02-17 PROCEDURE — 1157F PR ADVANCE CARE PLAN OR EQUIV PRESENT IN MEDICAL RECORD: ICD-10-PCS | Mod: S$GLB,,, | Performed by: INTERNAL MEDICINE

## 2021-02-17 PROCEDURE — 99214 PR OFFICE/OUTPT VISIT, EST, LEVL IV, 30-39 MIN: ICD-10-PCS | Mod: S$GLB,,, | Performed by: INTERNAL MEDICINE

## 2021-02-17 PROCEDURE — 3074F PR MOST RECENT SYSTOLIC BLOOD PRESSURE < 130 MM HG: ICD-10-PCS | Mod: CPTII,S$GLB,, | Performed by: INTERNAL MEDICINE

## 2021-02-17 PROCEDURE — 3008F PR BODY MASS INDEX (BMI) DOCUMENTED: ICD-10-PCS | Mod: CPTII,S$GLB,, | Performed by: INTERNAL MEDICINE

## 2021-02-17 PROCEDURE — 1157F ADVNC CARE PLAN IN RCRD: CPT | Mod: S$GLB,,, | Performed by: INTERNAL MEDICINE

## 2021-02-17 PROCEDURE — 1126F AMNT PAIN NOTED NONE PRSNT: CPT | Mod: S$GLB,,, | Performed by: INTERNAL MEDICINE

## 2021-02-17 PROCEDURE — 3074F SYST BP LT 130 MM HG: CPT | Mod: CPTII,S$GLB,, | Performed by: INTERNAL MEDICINE

## 2021-02-22 ENCOUNTER — LAB VISIT (OUTPATIENT)
Dept: LAB | Facility: HOSPITAL | Age: 76
End: 2021-02-22
Attending: INTERNAL MEDICINE
Payer: MEDICARE

## 2021-02-22 DIAGNOSIS — I12.9 BENIGN HYPERTENSION WITH CHRONIC KIDNEY DISEASE, STAGE III: ICD-10-CM

## 2021-02-22 DIAGNOSIS — N18.30 BENIGN HYPERTENSION WITH CHRONIC KIDNEY DISEASE, STAGE III: ICD-10-CM

## 2021-02-22 LAB
ALBUMIN SERPL BCP-MCNC: 3.4 G/DL (ref 3.5–5.2)
ALP SERPL-CCNC: 112 U/L (ref 55–135)
ALT SERPL W/O P-5'-P-CCNC: 27 U/L (ref 10–44)
ANION GAP SERPL CALC-SCNC: 9 MMOL/L (ref 8–16)
AST SERPL-CCNC: 14 U/L (ref 10–40)
BILIRUB SERPL-MCNC: 0.8 MG/DL (ref 0.1–1)
BUN SERPL-MCNC: 22 MG/DL (ref 8–23)
CALCIUM SERPL-MCNC: 8.3 MG/DL (ref 8.7–10.5)
CHLORIDE SERPL-SCNC: 106 MMOL/L (ref 95–110)
CHOLEST SERPL-MCNC: 143 MG/DL (ref 120–199)
CHOLEST/HDLC SERPL: 4.5 {RATIO} (ref 2–5)
CO2 SERPL-SCNC: 25 MMOL/L (ref 23–29)
CREAT SERPL-MCNC: 1.3 MG/DL (ref 0.5–1.4)
EST. GFR  (AFRICAN AMERICAN): >60 ML/MIN/1.73 M^2
EST. GFR  (NON AFRICAN AMERICAN): 54 ML/MIN/1.73 M^2
GLUCOSE SERPL-MCNC: 98 MG/DL (ref 70–110)
HDLC SERPL-MCNC: 32 MG/DL (ref 40–75)
HDLC SERPL: 22.4 % (ref 20–50)
LDLC SERPL CALC-MCNC: 85.6 MG/DL (ref 63–159)
NONHDLC SERPL-MCNC: 111 MG/DL
POTASSIUM SERPL-SCNC: 4 MMOL/L (ref 3.5–5.1)
PROT SERPL-MCNC: 6.7 G/DL (ref 6–8.4)
SODIUM SERPL-SCNC: 140 MMOL/L (ref 136–145)
TRIGL SERPL-MCNC: 127 MG/DL (ref 30–150)

## 2021-02-22 PROCEDURE — 80061 LIPID PANEL: CPT

## 2021-02-22 PROCEDURE — 80053 COMPREHEN METABOLIC PANEL: CPT

## 2021-02-22 PROCEDURE — 36415 COLL VENOUS BLD VENIPUNCTURE: CPT | Mod: PO

## 2021-02-25 ENCOUNTER — OFFICE VISIT (OUTPATIENT)
Dept: PODIATRY | Facility: CLINIC | Age: 76
End: 2021-02-25
Payer: MEDICARE

## 2021-02-25 VITALS — WEIGHT: 315 LBS | BODY MASS INDEX: 41.75 KG/M2 | HEIGHT: 73 IN

## 2021-02-25 DIAGNOSIS — I73.9 PVD (PERIPHERAL VASCULAR DISEASE): Primary | ICD-10-CM

## 2021-02-25 DIAGNOSIS — L84 PRE-ULCERATIVE CALLUSES: ICD-10-CM

## 2021-02-25 DIAGNOSIS — I87.2 VENOUS STASIS DERMATITIS OF BOTH LOWER EXTREMITIES: ICD-10-CM

## 2021-02-25 DIAGNOSIS — I87.8 VENOUS STASIS OF LOWER EXTREMITY: ICD-10-CM

## 2021-02-25 PROCEDURE — 99213 OFFICE O/P EST LOW 20 MIN: CPT | Mod: S$GLB,,, | Performed by: PODIATRIST

## 2021-02-25 PROCEDURE — 3288F FALL RISK ASSESSMENT DOCD: CPT | Mod: CPTII,S$GLB,, | Performed by: PODIATRIST

## 2021-02-25 PROCEDURE — 3008F PR BODY MASS INDEX (BMI) DOCUMENTED: ICD-10-PCS | Mod: CPTII,S$GLB,, | Performed by: PODIATRIST

## 2021-02-25 PROCEDURE — 99213 PR OFFICE/OUTPT VISIT, EST, LEVL III, 20-29 MIN: ICD-10-PCS | Mod: S$GLB,,, | Performed by: PODIATRIST

## 2021-02-25 PROCEDURE — 1157F PR ADVANCE CARE PLAN OR EQUIV PRESENT IN MEDICAL RECORD: ICD-10-PCS | Mod: S$GLB,,, | Performed by: PODIATRIST

## 2021-02-25 PROCEDURE — 3288F PR FALLS RISK ASSESSMENT DOCUMENTED: ICD-10-PCS | Mod: CPTII,S$GLB,, | Performed by: PODIATRIST

## 2021-02-25 PROCEDURE — 1159F MED LIST DOCD IN RCRD: CPT | Mod: S$GLB,,, | Performed by: PODIATRIST

## 2021-02-25 PROCEDURE — 3008F BODY MASS INDEX DOCD: CPT | Mod: CPTII,S$GLB,, | Performed by: PODIATRIST

## 2021-02-25 PROCEDURE — 1101F PT FALLS ASSESS-DOCD LE1/YR: CPT | Mod: CPTII,S$GLB,, | Performed by: PODIATRIST

## 2021-02-25 PROCEDURE — 99999 PR PBB SHADOW E&M-EST. PATIENT-LVL IV: CPT | Mod: PBBFAC,,, | Performed by: PODIATRIST

## 2021-02-25 PROCEDURE — 1157F ADVNC CARE PLAN IN RCRD: CPT | Mod: S$GLB,,, | Performed by: PODIATRIST

## 2021-02-25 PROCEDURE — 99499 RISK ADDL DX/OHS AUDIT: ICD-10-PCS | Mod: S$GLB,,, | Performed by: PODIATRIST

## 2021-02-25 PROCEDURE — 99999 PR PBB SHADOW E&M-EST. PATIENT-LVL IV: ICD-10-PCS | Mod: PBBFAC,,, | Performed by: PODIATRIST

## 2021-02-25 PROCEDURE — 1101F PR PT FALLS ASSESS DOC 0-1 FALLS W/OUT INJ PAST YR: ICD-10-PCS | Mod: CPTII,S$GLB,, | Performed by: PODIATRIST

## 2021-02-25 PROCEDURE — 1159F PR MEDICATION LIST DOCUMENTED IN MEDICAL RECORD: ICD-10-PCS | Mod: S$GLB,,, | Performed by: PODIATRIST

## 2021-02-25 PROCEDURE — 99499 UNLISTED E&M SERVICE: CPT | Mod: S$GLB,,, | Performed by: PODIATRIST

## 2021-02-25 PROCEDURE — 1126F PR PAIN SEVERITY QUANTIFIED, NO PAIN PRESENT: ICD-10-PCS | Mod: S$GLB,,, | Performed by: PODIATRIST

## 2021-02-25 PROCEDURE — 1126F AMNT PAIN NOTED NONE PRSNT: CPT | Mod: S$GLB,,, | Performed by: PODIATRIST

## 2021-03-08 ENCOUNTER — PATIENT MESSAGE (OUTPATIENT)
Dept: NEPHROLOGY | Facility: CLINIC | Age: 76
End: 2021-03-08

## 2021-03-09 DIAGNOSIS — N18.30 STAGE 3 CHRONIC KIDNEY DISEASE, UNSPECIFIED WHETHER STAGE 3A OR 3B CKD: Primary | ICD-10-CM

## 2021-03-11 ENCOUNTER — LAB VISIT (OUTPATIENT)
Dept: LAB | Facility: HOSPITAL | Age: 76
End: 2021-03-11
Attending: NURSE PRACTITIONER
Payer: MEDICARE

## 2021-03-11 DIAGNOSIS — N18.30 STAGE 3 CHRONIC KIDNEY DISEASE, UNSPECIFIED WHETHER STAGE 3A OR 3B CKD: ICD-10-CM

## 2021-03-11 LAB
ALBUMIN SERPL BCP-MCNC: 3.8 G/DL (ref 3.5–5.2)
ANION GAP SERPL CALC-SCNC: 12 MMOL/L (ref 8–16)
BASOPHILS # BLD AUTO: 0.11 K/UL (ref 0–0.2)
BASOPHILS NFR BLD: 0.9 % (ref 0–1.9)
BUN SERPL-MCNC: 30 MG/DL (ref 8–23)
CALCIUM SERPL-MCNC: 9.1 MG/DL (ref 8.7–10.5)
CHLORIDE SERPL-SCNC: 106 MMOL/L (ref 95–110)
CO2 SERPL-SCNC: 21 MMOL/L (ref 23–29)
CREAT SERPL-MCNC: 1.4 MG/DL (ref 0.5–1.4)
DIFFERENTIAL METHOD: ABNORMAL
EOSINOPHIL # BLD AUTO: 0.3 K/UL (ref 0–0.5)
EOSINOPHIL NFR BLD: 2.4 % (ref 0–8)
ERYTHROCYTE [DISTWIDTH] IN BLOOD BY AUTOMATED COUNT: 14.1 % (ref 11.5–14.5)
EST. GFR  (AFRICAN AMERICAN): 57 ML/MIN/1.73 M^2
EST. GFR  (NON AFRICAN AMERICAN): 49 ML/MIN/1.73 M^2
GLUCOSE SERPL-MCNC: 118 MG/DL (ref 70–110)
HCT VFR BLD AUTO: 37.9 % (ref 40–54)
HGB BLD-MCNC: 12.4 G/DL (ref 14–18)
IMM GRANULOCYTES # BLD AUTO: 0.03 K/UL (ref 0–0.04)
IMM GRANULOCYTES NFR BLD AUTO: 0.3 % (ref 0–0.5)
LYMPHOCYTES # BLD AUTO: 2.9 K/UL (ref 1–4.8)
LYMPHOCYTES NFR BLD: 24.9 % (ref 18–48)
MCH RBC QN AUTO: 32.4 PG (ref 27–31)
MCHC RBC AUTO-ENTMCNC: 32.7 G/DL (ref 32–36)
MCV RBC AUTO: 99 FL (ref 82–98)
MONOCYTES # BLD AUTO: 0.8 K/UL (ref 0.3–1)
MONOCYTES NFR BLD: 6.6 % (ref 4–15)
NEUTROPHILS # BLD AUTO: 7.5 K/UL (ref 1.8–7.7)
NEUTROPHILS NFR BLD: 64.9 % (ref 38–73)
NRBC BLD-RTO: 0 /100 WBC
PHOSPHATE SERPL-MCNC: 3.3 MG/DL (ref 2.7–4.5)
PLATELET # BLD AUTO: 211 K/UL (ref 150–350)
PMV BLD AUTO: 10.2 FL (ref 9.2–12.9)
POTASSIUM SERPL-SCNC: 4.4 MMOL/L (ref 3.5–5.1)
PTH-INTACT SERPL-MCNC: 161.2 PG/ML (ref 9–77)
RBC # BLD AUTO: 3.83 M/UL (ref 4.6–6.2)
SODIUM SERPL-SCNC: 139 MMOL/L (ref 136–145)
WBC # BLD AUTO: 11.58 K/UL (ref 3.9–12.7)

## 2021-03-11 PROCEDURE — 83970 ASSAY OF PARATHORMONE: CPT | Performed by: NURSE PRACTITIONER

## 2021-03-11 PROCEDURE — 80069 RENAL FUNCTION PANEL: CPT | Performed by: NURSE PRACTITIONER

## 2021-03-11 PROCEDURE — 82306 VITAMIN D 25 HYDROXY: CPT | Performed by: NURSE PRACTITIONER

## 2021-03-11 PROCEDURE — 36415 COLL VENOUS BLD VENIPUNCTURE: CPT | Performed by: NURSE PRACTITIONER

## 2021-03-11 PROCEDURE — 85025 COMPLETE CBC W/AUTO DIFF WBC: CPT | Performed by: NURSE PRACTITIONER

## 2021-03-12 LAB — 25(OH)D3+25(OH)D2 SERPL-MCNC: 28 NG/ML (ref 30–96)

## 2021-03-16 ENCOUNTER — TELEPHONE (OUTPATIENT)
Dept: CARDIOLOGY | Facility: CLINIC | Age: 76
End: 2021-03-16

## 2021-03-16 ENCOUNTER — OFFICE VISIT (OUTPATIENT)
Dept: NEPHROLOGY | Facility: CLINIC | Age: 76
End: 2021-03-16
Payer: MEDICARE

## 2021-03-16 VITALS
OXYGEN SATURATION: 95 % | SYSTOLIC BLOOD PRESSURE: 150 MMHG | HEIGHT: 73 IN | BODY MASS INDEX: 41.75 KG/M2 | WEIGHT: 315 LBS | DIASTOLIC BLOOD PRESSURE: 70 MMHG | HEART RATE: 84 BPM

## 2021-03-16 DIAGNOSIS — E87.5 HYPERKALEMIA: ICD-10-CM

## 2021-03-16 DIAGNOSIS — R09.89 LABILE BLOOD PRESSURE: ICD-10-CM

## 2021-03-16 DIAGNOSIS — E21.3 HYPERPARATHYROIDISM: ICD-10-CM

## 2021-03-16 DIAGNOSIS — N18.30 STAGE 3 CHRONIC KIDNEY DISEASE, UNSPECIFIED WHETHER STAGE 3A OR 3B CKD: ICD-10-CM

## 2021-03-16 DIAGNOSIS — E66.01 MORBID OBESITY WITH BODY MASS INDEX OF 45.0-49.9 IN ADULT: ICD-10-CM

## 2021-03-16 DIAGNOSIS — D64.9 ANEMIA, UNSPECIFIED TYPE: ICD-10-CM

## 2021-03-16 DIAGNOSIS — I10 HYPERTENSION, UNSPECIFIED TYPE: ICD-10-CM

## 2021-03-16 DIAGNOSIS — N18.32 CHRONIC KIDNEY DISEASE, STAGE 3B: Primary | ICD-10-CM

## 2021-03-16 DIAGNOSIS — R60.9 EDEMA, UNSPECIFIED TYPE: ICD-10-CM

## 2021-03-16 PROCEDURE — 1101F PT FALLS ASSESS-DOCD LE1/YR: CPT | Mod: CPTII,S$GLB,, | Performed by: NURSE PRACTITIONER

## 2021-03-16 PROCEDURE — 1159F PR MEDICATION LIST DOCUMENTED IN MEDICAL RECORD: ICD-10-PCS | Mod: S$GLB,,, | Performed by: NURSE PRACTITIONER

## 2021-03-16 PROCEDURE — 3288F PR FALLS RISK ASSESSMENT DOCUMENTED: ICD-10-PCS | Mod: CPTII,S$GLB,, | Performed by: NURSE PRACTITIONER

## 2021-03-16 PROCEDURE — 1101F PR PT FALLS ASSESS DOC 0-1 FALLS W/OUT INJ PAST YR: ICD-10-PCS | Mod: CPTII,S$GLB,, | Performed by: NURSE PRACTITIONER

## 2021-03-16 PROCEDURE — 1157F ADVNC CARE PLAN IN RCRD: CPT | Mod: S$GLB,,, | Performed by: NURSE PRACTITIONER

## 2021-03-16 PROCEDURE — 1126F AMNT PAIN NOTED NONE PRSNT: CPT | Mod: S$GLB,,, | Performed by: NURSE PRACTITIONER

## 2021-03-16 PROCEDURE — 3008F BODY MASS INDEX DOCD: CPT | Mod: CPTII,S$GLB,, | Performed by: NURSE PRACTITIONER

## 2021-03-16 PROCEDURE — 99999 PR PBB SHADOW E&M-EST. PATIENT-LVL V: CPT | Mod: PBBFAC,,, | Performed by: NURSE PRACTITIONER

## 2021-03-16 PROCEDURE — 3077F SYST BP >= 140 MM HG: CPT | Mod: CPTII,S$GLB,, | Performed by: NURSE PRACTITIONER

## 2021-03-16 PROCEDURE — 99499 RISK ADDL DX/OHS AUDIT: ICD-10-PCS | Mod: S$GLB,,, | Performed by: NURSE PRACTITIONER

## 2021-03-16 PROCEDURE — 1159F MED LIST DOCD IN RCRD: CPT | Mod: S$GLB,,, | Performed by: NURSE PRACTITIONER

## 2021-03-16 PROCEDURE — 1126F PR PAIN SEVERITY QUANTIFIED, NO PAIN PRESENT: ICD-10-PCS | Mod: S$GLB,,, | Performed by: NURSE PRACTITIONER

## 2021-03-16 PROCEDURE — 3078F DIAST BP <80 MM HG: CPT | Mod: CPTII,S$GLB,, | Performed by: NURSE PRACTITIONER

## 2021-03-16 PROCEDURE — 99499 UNLISTED E&M SERVICE: CPT | Mod: S$GLB,,, | Performed by: NURSE PRACTITIONER

## 2021-03-16 PROCEDURE — 3078F PR MOST RECENT DIASTOLIC BLOOD PRESSURE < 80 MM HG: ICD-10-PCS | Mod: CPTII,S$GLB,, | Performed by: NURSE PRACTITIONER

## 2021-03-16 PROCEDURE — 99999 PR PBB SHADOW E&M-EST. PATIENT-LVL V: ICD-10-PCS | Mod: PBBFAC,,, | Performed by: NURSE PRACTITIONER

## 2021-03-16 PROCEDURE — 3288F FALL RISK ASSESSMENT DOCD: CPT | Mod: CPTII,S$GLB,, | Performed by: NURSE PRACTITIONER

## 2021-03-16 PROCEDURE — 3008F PR BODY MASS INDEX (BMI) DOCUMENTED: ICD-10-PCS | Mod: CPTII,S$GLB,, | Performed by: NURSE PRACTITIONER

## 2021-03-16 PROCEDURE — 3077F PR MOST RECENT SYSTOLIC BLOOD PRESSURE >= 140 MM HG: ICD-10-PCS | Mod: CPTII,S$GLB,, | Performed by: NURSE PRACTITIONER

## 2021-03-16 PROCEDURE — 99214 OFFICE O/P EST MOD 30 MIN: CPT | Mod: S$GLB,,, | Performed by: NURSE PRACTITIONER

## 2021-03-16 PROCEDURE — 1157F PR ADVANCE CARE PLAN OR EQUIV PRESENT IN MEDICAL RECORD: ICD-10-PCS | Mod: S$GLB,,, | Performed by: NURSE PRACTITIONER

## 2021-03-16 PROCEDURE — 99214 PR OFFICE/OUTPT VISIT, EST, LEVL IV, 30-39 MIN: ICD-10-PCS | Mod: S$GLB,,, | Performed by: NURSE PRACTITIONER

## 2021-03-16 RX ORDER — FUROSEMIDE 20 MG/1
20 TABLET ORAL DAILY
Qty: 90 TABLET | Refills: 3 | Status: SHIPPED | OUTPATIENT
Start: 2021-03-16 | End: 2022-01-01 | Stop reason: SDUPTHER

## 2021-03-17 ENCOUNTER — IMMUNIZATION (OUTPATIENT)
Dept: PHARMACY | Facility: CLINIC | Age: 76
End: 2021-03-17
Payer: MEDICARE

## 2021-03-22 NOTE — PROGRESS NOTES
Pt is in the hospital/Pt will call back/When discharge.   [[id number]] : cc: joint aches\par \par c/o pain in her hands and swelling. Feels like a buzzing in her hands. She made an appt with a rheumatologist, but not until 4/15/21. \par She is depressed and can't focus. Goes to bed at 9:00. Used to go to bed at [place]. Wakes up tired.

## 2021-03-23 ENCOUNTER — OFFICE VISIT (OUTPATIENT)
Dept: PULMONOLOGY | Facility: CLINIC | Age: 76
End: 2021-03-23
Payer: MEDICARE

## 2021-03-23 VITALS
WEIGHT: 315 LBS | HEIGHT: 73 IN | OXYGEN SATURATION: 96 % | TEMPERATURE: 97 F | SYSTOLIC BLOOD PRESSURE: 145 MMHG | DIASTOLIC BLOOD PRESSURE: 72 MMHG | BODY MASS INDEX: 41.75 KG/M2 | HEART RATE: 73 BPM

## 2021-03-23 DIAGNOSIS — J98.4 SMALL AIRWAYS DISEASE: ICD-10-CM

## 2021-03-23 DIAGNOSIS — G47.33 OSA ON CPAP: ICD-10-CM

## 2021-03-23 DIAGNOSIS — D53.9 MACROCYTIC ANEMIA: ICD-10-CM

## 2021-03-23 DIAGNOSIS — R06.02 SHORTNESS OF BREATH: Primary | ICD-10-CM

## 2021-03-23 DIAGNOSIS — Z87.09 PERSONAL HISTORY OF ASBESTOSIS: ICD-10-CM

## 2021-03-23 PROCEDURE — 1101F PT FALLS ASSESS-DOCD LE1/YR: CPT | Mod: CPTII,S$GLB,, | Performed by: NURSE PRACTITIONER

## 2021-03-23 PROCEDURE — 3078F DIAST BP <80 MM HG: CPT | Mod: CPTII,S$GLB,, | Performed by: NURSE PRACTITIONER

## 2021-03-23 PROCEDURE — 99499 RISK ADDL DX/OHS AUDIT: ICD-10-PCS | Mod: S$GLB,,, | Performed by: NURSE PRACTITIONER

## 2021-03-23 PROCEDURE — 3077F SYST BP >= 140 MM HG: CPT | Mod: CPTII,S$GLB,, | Performed by: NURSE PRACTITIONER

## 2021-03-23 PROCEDURE — 99999 PR PBB SHADOW E&M-EST. PATIENT-LVL IV: ICD-10-PCS | Mod: PBBFAC,,, | Performed by: NURSE PRACTITIONER

## 2021-03-23 PROCEDURE — 99213 PR OFFICE/OUTPT VISIT, EST, LEVL III, 20-29 MIN: ICD-10-PCS | Mod: S$GLB,,, | Performed by: NURSE PRACTITIONER

## 2021-03-23 PROCEDURE — 1101F PR PT FALLS ASSESS DOC 0-1 FALLS W/OUT INJ PAST YR: ICD-10-PCS | Mod: CPTII,S$GLB,, | Performed by: NURSE PRACTITIONER

## 2021-03-23 PROCEDURE — 3008F BODY MASS INDEX DOCD: CPT | Mod: CPTII,S$GLB,, | Performed by: NURSE PRACTITIONER

## 2021-03-23 PROCEDURE — 3077F PR MOST RECENT SYSTOLIC BLOOD PRESSURE >= 140 MM HG: ICD-10-PCS | Mod: CPTII,S$GLB,, | Performed by: NURSE PRACTITIONER

## 2021-03-23 PROCEDURE — 1126F PR PAIN SEVERITY QUANTIFIED, NO PAIN PRESENT: ICD-10-PCS | Mod: S$GLB,,, | Performed by: NURSE PRACTITIONER

## 2021-03-23 PROCEDURE — 3078F PR MOST RECENT DIASTOLIC BLOOD PRESSURE < 80 MM HG: ICD-10-PCS | Mod: CPTII,S$GLB,, | Performed by: NURSE PRACTITIONER

## 2021-03-23 PROCEDURE — 3288F PR FALLS RISK ASSESSMENT DOCUMENTED: ICD-10-PCS | Mod: CPTII,S$GLB,, | Performed by: NURSE PRACTITIONER

## 2021-03-23 PROCEDURE — 1159F MED LIST DOCD IN RCRD: CPT | Mod: S$GLB,,, | Performed by: NURSE PRACTITIONER

## 2021-03-23 PROCEDURE — 99213 OFFICE O/P EST LOW 20 MIN: CPT | Mod: S$GLB,,, | Performed by: NURSE PRACTITIONER

## 2021-03-23 PROCEDURE — 1157F PR ADVANCE CARE PLAN OR EQUIV PRESENT IN MEDICAL RECORD: ICD-10-PCS | Mod: S$GLB,,, | Performed by: NURSE PRACTITIONER

## 2021-03-23 PROCEDURE — 99999 PR PBB SHADOW E&M-EST. PATIENT-LVL IV: CPT | Mod: PBBFAC,,, | Performed by: NURSE PRACTITIONER

## 2021-03-23 PROCEDURE — 1126F AMNT PAIN NOTED NONE PRSNT: CPT | Mod: S$GLB,,, | Performed by: NURSE PRACTITIONER

## 2021-03-23 PROCEDURE — 99499 UNLISTED E&M SERVICE: CPT | Mod: S$GLB,,, | Performed by: NURSE PRACTITIONER

## 2021-03-23 PROCEDURE — 1159F PR MEDICATION LIST DOCUMENTED IN MEDICAL RECORD: ICD-10-PCS | Mod: S$GLB,,, | Performed by: NURSE PRACTITIONER

## 2021-03-23 PROCEDURE — 1157F ADVNC CARE PLAN IN RCRD: CPT | Mod: S$GLB,,, | Performed by: NURSE PRACTITIONER

## 2021-03-23 PROCEDURE — 3288F FALL RISK ASSESSMENT DOCD: CPT | Mod: CPTII,S$GLB,, | Performed by: NURSE PRACTITIONER

## 2021-03-23 PROCEDURE — 3008F PR BODY MASS INDEX (BMI) DOCUMENTED: ICD-10-PCS | Mod: CPTII,S$GLB,, | Performed by: NURSE PRACTITIONER

## 2021-03-23 RX ORDER — ALBUTEROL SULFATE 90 UG/1
2 AEROSOL, METERED RESPIRATORY (INHALATION) EVERY 6 HOURS PRN
Qty: 18 G | Refills: 0 | Status: SHIPPED | OUTPATIENT
Start: 2021-03-23 | End: 2021-06-17 | Stop reason: SDUPTHER

## 2021-03-24 ENCOUNTER — PATIENT MESSAGE (OUTPATIENT)
Dept: UROLOGY | Facility: CLINIC | Age: 76
End: 2021-03-24

## 2021-03-28 DIAGNOSIS — E78.5 HYPERLIPIDEMIA, UNSPECIFIED HYPERLIPIDEMIA TYPE: ICD-10-CM

## 2021-03-29 RX ORDER — ATORVASTATIN CALCIUM 40 MG/1
40 TABLET, FILM COATED ORAL DAILY
Qty: 90 TABLET | Refills: 1 | Status: SHIPPED | OUTPATIENT
Start: 2021-03-29 | End: 2021-10-04

## 2021-04-01 ENCOUNTER — PATIENT MESSAGE (OUTPATIENT)
Dept: PULMONOLOGY | Facility: CLINIC | Age: 76
End: 2021-04-01

## 2021-04-01 DIAGNOSIS — J61 ASBESTOSIS: Primary | ICD-10-CM

## 2021-04-01 DIAGNOSIS — R06.02 SHORTNESS OF BREATH: ICD-10-CM

## 2021-04-01 DIAGNOSIS — J98.4 SMALL AIRWAYS DISEASE: ICD-10-CM

## 2021-04-05 ENCOUNTER — TELEPHONE (OUTPATIENT)
Dept: UROLOGY | Facility: CLINIC | Age: 76
End: 2021-04-05

## 2021-04-05 ENCOUNTER — TELEPHONE (OUTPATIENT)
Dept: UROLOGY | Facility: HOSPITAL | Age: 76
End: 2021-04-05

## 2021-04-05 DIAGNOSIS — C61 PROSTATE CANCER: Primary | ICD-10-CM

## 2021-04-06 ENCOUNTER — LAB VISIT (OUTPATIENT)
Dept: LAB | Facility: HOSPITAL | Age: 76
End: 2021-04-06
Attending: UROLOGY
Payer: MEDICARE

## 2021-04-06 ENCOUNTER — OFFICE VISIT (OUTPATIENT)
Dept: CARDIOLOGY | Facility: CLINIC | Age: 76
End: 2021-04-06
Payer: MEDICARE

## 2021-04-06 VITALS
DIASTOLIC BLOOD PRESSURE: 72 MMHG | HEART RATE: 86 BPM | HEIGHT: 73 IN | SYSTOLIC BLOOD PRESSURE: 126 MMHG | OXYGEN SATURATION: 96 % | BODY MASS INDEX: 41.75 KG/M2 | WEIGHT: 315 LBS

## 2021-04-06 DIAGNOSIS — G47.33 OSA ON CPAP: ICD-10-CM

## 2021-04-06 DIAGNOSIS — E78.49 OTHER HYPERLIPIDEMIA: ICD-10-CM

## 2021-04-06 DIAGNOSIS — R07.9 CHEST PAIN, CARDIAC: ICD-10-CM

## 2021-04-06 DIAGNOSIS — R06.02 SHORTNESS OF BREATH: ICD-10-CM

## 2021-04-06 DIAGNOSIS — C61 PROSTATE CANCER: ICD-10-CM

## 2021-04-06 DIAGNOSIS — I73.9 PVD (PERIPHERAL VASCULAR DISEASE): ICD-10-CM

## 2021-04-06 DIAGNOSIS — I48.0 PAROXYSMAL ATRIAL FIBRILLATION: ICD-10-CM

## 2021-04-06 DIAGNOSIS — I25.10 CORONARY ARTERY DISEASE INVOLVING NATIVE CORONARY ARTERY OF NATIVE HEART WITHOUT ANGINA PECTORIS: Primary | ICD-10-CM

## 2021-04-06 PROCEDURE — 3288F FALL RISK ASSESSMENT DOCD: CPT | Mod: CPTII,S$GLB,, | Performed by: INTERNAL MEDICINE

## 2021-04-06 PROCEDURE — 1101F PT FALLS ASSESS-DOCD LE1/YR: CPT | Mod: CPTII,S$GLB,, | Performed by: INTERNAL MEDICINE

## 2021-04-06 PROCEDURE — 3074F SYST BP LT 130 MM HG: CPT | Mod: CPTII,S$GLB,, | Performed by: INTERNAL MEDICINE

## 2021-04-06 PROCEDURE — 3078F PR MOST RECENT DIASTOLIC BLOOD PRESSURE < 80 MM HG: ICD-10-PCS | Mod: CPTII,S$GLB,, | Performed by: INTERNAL MEDICINE

## 2021-04-06 PROCEDURE — 99999 PR PBB SHADOW E&M-EST. PATIENT-LVL V: ICD-10-PCS | Mod: PBBFAC,,, | Performed by: INTERNAL MEDICINE

## 2021-04-06 PROCEDURE — 1157F ADVNC CARE PLAN IN RCRD: CPT | Mod: S$GLB,,, | Performed by: INTERNAL MEDICINE

## 2021-04-06 PROCEDURE — 3008F PR BODY MASS INDEX (BMI) DOCUMENTED: ICD-10-PCS | Mod: CPTII,S$GLB,, | Performed by: INTERNAL MEDICINE

## 2021-04-06 PROCEDURE — 3078F DIAST BP <80 MM HG: CPT | Mod: CPTII,S$GLB,, | Performed by: INTERNAL MEDICINE

## 2021-04-06 PROCEDURE — 84153 ASSAY OF PSA TOTAL: CPT | Performed by: UROLOGY

## 2021-04-06 PROCEDURE — 99499 UNLISTED E&M SERVICE: CPT | Mod: S$GLB,,, | Performed by: INTERNAL MEDICINE

## 2021-04-06 PROCEDURE — 3008F BODY MASS INDEX DOCD: CPT | Mod: CPTII,S$GLB,, | Performed by: INTERNAL MEDICINE

## 2021-04-06 PROCEDURE — 1157F PR ADVANCE CARE PLAN OR EQUIV PRESENT IN MEDICAL RECORD: ICD-10-PCS | Mod: S$GLB,,, | Performed by: INTERNAL MEDICINE

## 2021-04-06 PROCEDURE — 1126F AMNT PAIN NOTED NONE PRSNT: CPT | Mod: S$GLB,,, | Performed by: INTERNAL MEDICINE

## 2021-04-06 PROCEDURE — 1101F PR PT FALLS ASSESS DOC 0-1 FALLS W/OUT INJ PAST YR: ICD-10-PCS | Mod: CPTII,S$GLB,, | Performed by: INTERNAL MEDICINE

## 2021-04-06 PROCEDURE — 36415 COLL VENOUS BLD VENIPUNCTURE: CPT | Performed by: UROLOGY

## 2021-04-06 PROCEDURE — 1126F PR PAIN SEVERITY QUANTIFIED, NO PAIN PRESENT: ICD-10-PCS | Mod: S$GLB,,, | Performed by: INTERNAL MEDICINE

## 2021-04-06 PROCEDURE — 3288F PR FALLS RISK ASSESSMENT DOCUMENTED: ICD-10-PCS | Mod: CPTII,S$GLB,, | Performed by: INTERNAL MEDICINE

## 2021-04-06 PROCEDURE — 99499 RISK ADDL DX/OHS AUDIT: ICD-10-PCS | Mod: S$GLB,,, | Performed by: INTERNAL MEDICINE

## 2021-04-06 PROCEDURE — 99214 OFFICE O/P EST MOD 30 MIN: CPT | Mod: S$GLB,,, | Performed by: INTERNAL MEDICINE

## 2021-04-06 PROCEDURE — 1159F PR MEDICATION LIST DOCUMENTED IN MEDICAL RECORD: ICD-10-PCS | Mod: S$GLB,,, | Performed by: INTERNAL MEDICINE

## 2021-04-06 PROCEDURE — 3074F PR MOST RECENT SYSTOLIC BLOOD PRESSURE < 130 MM HG: ICD-10-PCS | Mod: CPTII,S$GLB,, | Performed by: INTERNAL MEDICINE

## 2021-04-06 PROCEDURE — 99999 PR PBB SHADOW E&M-EST. PATIENT-LVL V: CPT | Mod: PBBFAC,,, | Performed by: INTERNAL MEDICINE

## 2021-04-06 PROCEDURE — 1159F MED LIST DOCD IN RCRD: CPT | Mod: S$GLB,,, | Performed by: INTERNAL MEDICINE

## 2021-04-06 PROCEDURE — 99214 PR OFFICE/OUTPT VISIT, EST, LEVL IV, 30-39 MIN: ICD-10-PCS | Mod: S$GLB,,, | Performed by: INTERNAL MEDICINE

## 2021-04-08 ENCOUNTER — TELEPHONE (OUTPATIENT)
Dept: PULMONOLOGY | Facility: CLINIC | Age: 76
End: 2021-04-08

## 2021-04-08 LAB — COMPLEXED PSA SERPL-MCNC: <0.01 NG/ML (ref 0–4)

## 2021-04-13 ENCOUNTER — PATIENT MESSAGE (OUTPATIENT)
Dept: PULMONOLOGY | Facility: CLINIC | Age: 76
End: 2021-04-13

## 2021-04-13 ENCOUNTER — NUTRITION (OUTPATIENT)
Dept: NUTRITION | Facility: CLINIC | Age: 76
End: 2021-04-13
Payer: MEDICARE

## 2021-04-13 ENCOUNTER — PATIENT MESSAGE (OUTPATIENT)
Dept: NEPHROLOGY | Facility: CLINIC | Age: 76
End: 2021-04-13

## 2021-04-13 VITALS — BODY MASS INDEX: 41.75 KG/M2 | WEIGHT: 315 LBS | HEIGHT: 73 IN

## 2021-04-13 DIAGNOSIS — Z71.3 DIETARY COUNSELING: ICD-10-CM

## 2021-04-13 DIAGNOSIS — E66.01 MORBID OBESITY WITH BMI OF 40.0-44.9, ADULT: ICD-10-CM

## 2021-04-13 DIAGNOSIS — N18.30 STAGE 3 CHRONIC KIDNEY DISEASE, UNSPECIFIED WHETHER STAGE 3A OR 3B CKD: Primary | ICD-10-CM

## 2021-04-13 DIAGNOSIS — N18.32 CHRONIC KIDNEY DISEASE, STAGE 3B: ICD-10-CM

## 2021-04-13 PROCEDURE — 99999 PR PBB SHADOW E&M-EST. PATIENT-LVL IV: CPT | Mod: PBBFAC,,,

## 2021-04-13 PROCEDURE — 97802 PR MED NUTR THER, 1ST, INDIV, EA 15 MIN: ICD-10-PCS | Mod: S$GLB,,, | Performed by: DIETITIAN, REGISTERED

## 2021-04-13 PROCEDURE — 99999 PR PBB SHADOW E&M-EST. PATIENT-LVL IV: ICD-10-PCS | Mod: PBBFAC,,,

## 2021-04-13 PROCEDURE — 97802 MEDICAL NUTRITION INDIV IN: CPT | Mod: S$GLB,,, | Performed by: DIETITIAN, REGISTERED

## 2021-04-19 ENCOUNTER — TELEPHONE (OUTPATIENT)
Dept: CARDIOLOGY | Facility: CLINIC | Age: 76
End: 2021-04-19

## 2021-04-21 ENCOUNTER — HOSPITAL ENCOUNTER (OUTPATIENT)
Dept: CARDIOLOGY | Facility: HOSPITAL | Age: 76
Discharge: HOME | End: 2021-04-21
Attending: INTERNAL MEDICINE
Payer: MEDICARE

## 2021-04-21 VITALS — BODY MASS INDEX: 41.75 KG/M2 | WEIGHT: 315 LBS | HEIGHT: 73 IN

## 2021-04-21 DIAGNOSIS — E78.49 OTHER HYPERLIPIDEMIA: ICD-10-CM

## 2021-04-21 DIAGNOSIS — I25.10 CORONARY ARTERY DISEASE INVOLVING NATIVE CORONARY ARTERY OF NATIVE HEART WITHOUT ANGINA PECTORIS: ICD-10-CM

## 2021-04-21 DIAGNOSIS — I73.9 PVD (PERIPHERAL VASCULAR DISEASE): ICD-10-CM

## 2021-04-21 DIAGNOSIS — R06.02 SHORTNESS OF BREATH: ICD-10-CM

## 2021-04-21 DIAGNOSIS — R07.9 CHEST PAIN, CARDIAC: ICD-10-CM

## 2021-04-21 DIAGNOSIS — I48.0 PAROXYSMAL ATRIAL FIBRILLATION: ICD-10-CM

## 2021-04-21 DIAGNOSIS — G47.33 OSA ON CPAP: ICD-10-CM

## 2021-04-21 LAB
CFR FLOW - ANTERIOR: 1.48
CFR FLOW - INFERIOR: 1.5
CFR FLOW - LATERAL: 1.29
CFR FLOW - MAX: 2.95
CFR FLOW - MIN: 0.7
CFR FLOW - SEPTAL: 1.67
CFR FLOW - WHOLE HEART: 1.49
CV PHARM DOSE: 60 MG
CV STRESS BASE HR: 68 BPM
DIASTOLIC BLOOD PRESSURE: 68 MMHG
END DIASTOLIC INDEX-HIGH: 170 ML/M2
END SYSTOLIC INDEX-HIGH: 70 ML/M2
NUC REST DIASTOLIC VOLUME INDEX: 102
NUC REST EJECTION FRACTION: 77
NUC REST SYSTOLIC VOLUME INDEX: 23
NUC STRESS DIASTOLIC VOLUME INDEX: 97
NUC STRESS EJECTION FRACTION: 67 %
NUC STRESS SYSTOLIC VOLUME INDEX: 32
OHS CV CPX 85 PERCENT MAX PREDICTED HEART RATE MALE: 124
OHS CV CPX MAX PREDICTED HEART RATE: 146
OHS CV CPX PATIENT IS FEMALE: 0
OHS CV CPX PATIENT IS MALE: 1
OHS CV CPX PEAK DIASTOLIC BLOOD PRESSURE: 53 MMHG
OHS CV CPX PEAK HEAR RATE: 77 BPM
OHS CV CPX PEAK RATE PRESSURE PRODUCT: NORMAL
OHS CV CPX PEAK SYSTOLIC BLOOD PRESSURE: 143 MMHG
OHS CV CPX PERCENT MAX PREDICTED HEART RATE ACHIEVED: 53
OHS CV CPX RATE PRESSURE PRODUCT PRESENTING: 9452
REST FLOW - ANTERIOR: 1.01 CC/MIN/G
REST FLOW - INFERIOR: 1.03 CC/MIN/G
REST FLOW - LATERAL: 1.36 CC/MIN/G
REST FLOW - MAX: 1.78 CC/MIN/G
REST FLOW - MIN: 0.66 CC/MIN/G
REST FLOW - SEPTAL: 0.93 CC/MIN/G
REST FLOW - WHOLE HEART: 1.08 CC/MIN/G
RETIRED EF AND QEF - SEE NOTES: 51 %
STRESS FLOW - ANTERIOR: 1.42 CC/MIN/G
STRESS FLOW - INFERIOR: 1.5 CC/MIN/G
STRESS FLOW - LATERAL: 1.72 CC/MIN/G
STRESS FLOW - MAX: 2.03 CC/MIN/G
STRESS FLOW - MIN: 0.79 CC/MIN/G
STRESS FLOW - SEPTAL: 1.45 CC/MIN/G
STRESS FLOW - WHOLE HEART: 1.52 CC/MIN/G
STRESS ST DEPRESSION: 0.8 MM
SYSTOLIC BLOOD PRESSURE: 139 MMHG

## 2021-04-21 PROCEDURE — 78434 AQMBF PET REST & RX STRESS: CPT | Mod: 26,,, | Performed by: INTERNAL MEDICINE

## 2021-04-21 PROCEDURE — 93016 CARDIAC PET SCAN STRESS (CUPID ONLY): ICD-10-PCS | Mod: ,,, | Performed by: INTERNAL MEDICINE

## 2021-04-21 PROCEDURE — 78434 CARDIAC PET SCAN STRESS (CUPID ONLY): ICD-10-PCS | Mod: 26,,, | Performed by: INTERNAL MEDICINE

## 2021-04-21 PROCEDURE — 93018 CV STRESS TEST I&R ONLY: CPT | Mod: ,,, | Performed by: INTERNAL MEDICINE

## 2021-04-21 PROCEDURE — 93016 CV STRESS TEST SUPVJ ONLY: CPT | Mod: ,,, | Performed by: INTERNAL MEDICINE

## 2021-04-21 PROCEDURE — 93018 CARDIAC PET SCAN STRESS (CUPID ONLY): ICD-10-PCS | Mod: ,,, | Performed by: INTERNAL MEDICINE

## 2021-04-21 PROCEDURE — 63600175 PHARM REV CODE 636 W HCPCS: Performed by: INTERNAL MEDICINE

## 2021-04-21 PROCEDURE — 78492 CARDIAC PET SCAN STRESS (CUPID ONLY): ICD-10-PCS | Mod: 26,,, | Performed by: INTERNAL MEDICINE

## 2021-04-21 PROCEDURE — 78434 AQMBF PET REST & RX STRESS: CPT

## 2021-04-21 PROCEDURE — 78492 MYOCRD IMG PET MLT RST&STRS: CPT | Mod: 26,,, | Performed by: INTERNAL MEDICINE

## 2021-04-21 RX ORDER — AMINOPHYLLINE 25 MG/ML
75 INJECTION, SOLUTION INTRAVENOUS ONCE
Status: COMPLETED | OUTPATIENT
Start: 2021-04-21 | End: 2021-04-21

## 2021-04-21 RX ORDER — DIPYRIDAMOLE 5 MG/ML
60 INJECTION INTRAVENOUS ONCE
Status: COMPLETED | OUTPATIENT
Start: 2021-04-21 | End: 2021-04-21

## 2021-04-21 RX ADMIN — DIPYRIDAMOLE 60 MG: 5 INJECTION INTRAVENOUS at 02:04

## 2021-04-21 RX ADMIN — AMINOPHYLLINE 75 MG: 25 INJECTION, SOLUTION INTRAVENOUS at 02:04

## 2021-05-11 ENCOUNTER — TELEPHONE (OUTPATIENT)
Dept: FAMILY MEDICINE | Facility: CLINIC | Age: 76
End: 2021-05-11

## 2021-05-12 ENCOUNTER — OFFICE VISIT (OUTPATIENT)
Dept: CARDIOLOGY | Facility: CLINIC | Age: 76
End: 2021-05-12
Payer: MEDICARE

## 2021-05-12 VITALS
HEART RATE: 62 BPM | DIASTOLIC BLOOD PRESSURE: 70 MMHG | OXYGEN SATURATION: 97 % | SYSTOLIC BLOOD PRESSURE: 124 MMHG | HEIGHT: 73 IN | BODY MASS INDEX: 41.75 KG/M2 | WEIGHT: 315 LBS

## 2021-05-12 DIAGNOSIS — R07.9 CHEST PAIN, CARDIAC: ICD-10-CM

## 2021-05-12 DIAGNOSIS — Z98.890 HISTORY OF LOOP RECORDER: ICD-10-CM

## 2021-05-12 DIAGNOSIS — I73.9 PVD (PERIPHERAL VASCULAR DISEASE): ICD-10-CM

## 2021-05-12 DIAGNOSIS — I25.10 CORONARY ARTERY DISEASE INVOLVING NATIVE CORONARY ARTERY OF NATIVE HEART WITHOUT ANGINA PECTORIS: Primary | ICD-10-CM

## 2021-05-12 DIAGNOSIS — N18.30 BENIGN HYPERTENSION WITH CHRONIC KIDNEY DISEASE, STAGE III: ICD-10-CM

## 2021-05-12 DIAGNOSIS — E78.49 OTHER HYPERLIPIDEMIA: ICD-10-CM

## 2021-05-12 DIAGNOSIS — I12.9 BENIGN HYPERTENSION WITH CHRONIC KIDNEY DISEASE, STAGE III: ICD-10-CM

## 2021-05-12 DIAGNOSIS — I65.23 BILATERAL CAROTID ARTERY STENOSIS: ICD-10-CM

## 2021-05-12 DIAGNOSIS — I25.10 CORONARY ARTERY DISEASE INVOLVING NATIVE CORONARY ARTERY OF NATIVE HEART WITHOUT ANGINA PECTORIS: ICD-10-CM

## 2021-05-12 DIAGNOSIS — I48.0 PAROXYSMAL ATRIAL FIBRILLATION: ICD-10-CM

## 2021-05-12 PROCEDURE — 3008F BODY MASS INDEX DOCD: CPT | Mod: CPTII,S$GLB,, | Performed by: INTERNAL MEDICINE

## 2021-05-12 PROCEDURE — 3008F PR BODY MASS INDEX (BMI) DOCUMENTED: ICD-10-PCS | Mod: CPTII,S$GLB,, | Performed by: INTERNAL MEDICINE

## 2021-05-12 PROCEDURE — 99999 PR PBB SHADOW E&M-EST. PATIENT-LVL V: CPT | Mod: PBBFAC,,, | Performed by: INTERNAL MEDICINE

## 2021-05-12 PROCEDURE — 1126F AMNT PAIN NOTED NONE PRSNT: CPT | Mod: S$GLB,,, | Performed by: INTERNAL MEDICINE

## 2021-05-12 PROCEDURE — 93000 EKG 12-LEAD: ICD-10-PCS | Mod: S$GLB,,, | Performed by: INTERNAL MEDICINE

## 2021-05-12 PROCEDURE — 3288F PR FALLS RISK ASSESSMENT DOCUMENTED: ICD-10-PCS | Mod: CPTII,S$GLB,, | Performed by: INTERNAL MEDICINE

## 2021-05-12 PROCEDURE — 1101F PR PT FALLS ASSESS DOC 0-1 FALLS W/OUT INJ PAST YR: ICD-10-PCS | Mod: CPTII,S$GLB,, | Performed by: INTERNAL MEDICINE

## 2021-05-12 PROCEDURE — 93000 ELECTROCARDIOGRAM COMPLETE: CPT | Mod: S$GLB,,, | Performed by: INTERNAL MEDICINE

## 2021-05-12 PROCEDURE — 1159F MED LIST DOCD IN RCRD: CPT | Mod: S$GLB,,, | Performed by: INTERNAL MEDICINE

## 2021-05-12 PROCEDURE — 1159F PR MEDICATION LIST DOCUMENTED IN MEDICAL RECORD: ICD-10-PCS | Mod: S$GLB,,, | Performed by: INTERNAL MEDICINE

## 2021-05-12 PROCEDURE — 1157F PR ADVANCE CARE PLAN OR EQUIV PRESENT IN MEDICAL RECORD: ICD-10-PCS | Mod: S$GLB,,, | Performed by: INTERNAL MEDICINE

## 2021-05-12 PROCEDURE — 99499 RISK ADDL DX/OHS AUDIT: ICD-10-PCS | Mod: S$GLB,,, | Performed by: INTERNAL MEDICINE

## 2021-05-12 PROCEDURE — 99214 PR OFFICE/OUTPT VISIT, EST, LEVL IV, 30-39 MIN: ICD-10-PCS | Mod: S$GLB,,, | Performed by: INTERNAL MEDICINE

## 2021-05-12 PROCEDURE — 3288F FALL RISK ASSESSMENT DOCD: CPT | Mod: CPTII,S$GLB,, | Performed by: INTERNAL MEDICINE

## 2021-05-12 PROCEDURE — 1101F PT FALLS ASSESS-DOCD LE1/YR: CPT | Mod: CPTII,S$GLB,, | Performed by: INTERNAL MEDICINE

## 2021-05-12 PROCEDURE — 99499 UNLISTED E&M SERVICE: CPT | Mod: S$GLB,,, | Performed by: INTERNAL MEDICINE

## 2021-05-12 PROCEDURE — 1126F PR PAIN SEVERITY QUANTIFIED, NO PAIN PRESENT: ICD-10-PCS | Mod: S$GLB,,, | Performed by: INTERNAL MEDICINE

## 2021-05-12 PROCEDURE — 99214 OFFICE O/P EST MOD 30 MIN: CPT | Mod: S$GLB,,, | Performed by: INTERNAL MEDICINE

## 2021-05-12 PROCEDURE — 1157F ADVNC CARE PLAN IN RCRD: CPT | Mod: S$GLB,,, | Performed by: INTERNAL MEDICINE

## 2021-05-12 PROCEDURE — 99999 PR PBB SHADOW E&M-EST. PATIENT-LVL V: ICD-10-PCS | Mod: PBBFAC,,, | Performed by: INTERNAL MEDICINE

## 2021-05-17 ENCOUNTER — PATIENT MESSAGE (OUTPATIENT)
Dept: OPTOMETRY | Facility: CLINIC | Age: 76
End: 2021-05-17

## 2021-05-18 ENCOUNTER — OFFICE VISIT (OUTPATIENT)
Dept: OPTOMETRY | Facility: CLINIC | Age: 76
End: 2021-05-18
Payer: COMMERCIAL

## 2021-05-18 DIAGNOSIS — H04.123 DRY EYE SYNDROME OF BOTH EYES: ICD-10-CM

## 2021-05-18 DIAGNOSIS — Z96.1 PSEUDOPHAKIA OF BOTH EYES: ICD-10-CM

## 2021-05-18 DIAGNOSIS — H52.4 PRESBYOPIA: ICD-10-CM

## 2021-05-18 DIAGNOSIS — Z01.00 ROUTINE EYE EXAM: Primary | ICD-10-CM

## 2021-05-18 DIAGNOSIS — H53.8 BLURRED VISION, BILATERAL: ICD-10-CM

## 2021-05-18 DIAGNOSIS — B00.52 HERPES SIMPLEX DISCIFORM KERATITIS: ICD-10-CM

## 2021-05-18 PROCEDURE — 92015 DETERMINE REFRACTIVE STATE: CPT | Mod: S$GLB,,, | Performed by: OPTOMETRIST

## 2021-05-18 PROCEDURE — 99999 PR PBB SHADOW E&M-EST. PATIENT-LVL III: CPT | Mod: PBBFAC,,, | Performed by: OPTOMETRIST

## 2021-05-18 PROCEDURE — 92014 COMPRE OPH EXAM EST PT 1/>: CPT | Mod: S$GLB,,, | Performed by: OPTOMETRIST

## 2021-05-18 PROCEDURE — 92015 PR REFRACTION: ICD-10-PCS | Mod: S$GLB,,, | Performed by: OPTOMETRIST

## 2021-05-18 PROCEDURE — 92014 PR EYE EXAM, EST PATIENT,COMPREHESV: ICD-10-PCS | Mod: S$GLB,,, | Performed by: OPTOMETRIST

## 2021-05-18 PROCEDURE — 99999 PR PBB SHADOW E&M-EST. PATIENT-LVL III: ICD-10-PCS | Mod: PBBFAC,,, | Performed by: OPTOMETRIST

## 2021-06-04 ENCOUNTER — OFFICE VISIT (OUTPATIENT)
Dept: OPTOMETRY | Facility: CLINIC | Age: 76
End: 2021-06-04
Payer: MEDICARE

## 2021-06-04 DIAGNOSIS — H52.4 PRESBYOPIA: ICD-10-CM

## 2021-06-04 DIAGNOSIS — H04.123 DRY EYE SYNDROME OF BOTH EYES: Primary | ICD-10-CM

## 2021-06-04 PROCEDURE — 3288F FALL RISK ASSESSMENT DOCD: CPT | Mod: CPTII,S$GLB,, | Performed by: OPTOMETRIST

## 2021-06-04 PROCEDURE — 92012 INTRM OPH EXAM EST PATIENT: CPT | Mod: S$GLB,,, | Performed by: OPTOMETRIST

## 2021-06-04 PROCEDURE — 1101F PR PT FALLS ASSESS DOC 0-1 FALLS W/OUT INJ PAST YR: ICD-10-PCS | Mod: CPTII,S$GLB,, | Performed by: OPTOMETRIST

## 2021-06-04 PROCEDURE — 1126F PR PAIN SEVERITY QUANTIFIED, NO PAIN PRESENT: ICD-10-PCS | Mod: S$GLB,,, | Performed by: OPTOMETRIST

## 2021-06-04 PROCEDURE — 1126F AMNT PAIN NOTED NONE PRSNT: CPT | Mod: S$GLB,,, | Performed by: OPTOMETRIST

## 2021-06-04 PROCEDURE — 99999 PR PBB SHADOW E&M-EST. PATIENT-LVL III: ICD-10-PCS | Mod: PBBFAC,,, | Performed by: OPTOMETRIST

## 2021-06-04 PROCEDURE — 1157F ADVNC CARE PLAN IN RCRD: CPT | Mod: S$GLB,,, | Performed by: OPTOMETRIST

## 2021-06-04 PROCEDURE — 92012 PR EYE EXAM, EST PATIENT,INTERMED: ICD-10-PCS | Mod: S$GLB,,, | Performed by: OPTOMETRIST

## 2021-06-04 PROCEDURE — 3288F PR FALLS RISK ASSESSMENT DOCUMENTED: ICD-10-PCS | Mod: CPTII,S$GLB,, | Performed by: OPTOMETRIST

## 2021-06-04 PROCEDURE — 1157F PR ADVANCE CARE PLAN OR EQUIV PRESENT IN MEDICAL RECORD: ICD-10-PCS | Mod: S$GLB,,, | Performed by: OPTOMETRIST

## 2021-06-04 PROCEDURE — 99999 PR PBB SHADOW E&M-EST. PATIENT-LVL III: CPT | Mod: PBBFAC,,, | Performed by: OPTOMETRIST

## 2021-06-04 PROCEDURE — 1101F PT FALLS ASSESS-DOCD LE1/YR: CPT | Mod: CPTII,S$GLB,, | Performed by: OPTOMETRIST

## 2021-06-08 ENCOUNTER — OFFICE VISIT (OUTPATIENT)
Dept: DERMATOLOGY | Facility: CLINIC | Age: 76
End: 2021-06-08
Payer: MEDICARE

## 2021-06-08 DIAGNOSIS — L30.9 ECZEMA, UNSPECIFIED TYPE: ICD-10-CM

## 2021-06-08 DIAGNOSIS — Z85.828 HISTORY OF SKIN CANCER: ICD-10-CM

## 2021-06-08 DIAGNOSIS — L29.9 ITCH: ICD-10-CM

## 2021-06-08 DIAGNOSIS — D49.9 NEOPLASM: Primary | ICD-10-CM

## 2021-06-08 PROCEDURE — 1157F PR ADVANCE CARE PLAN OR EQUIV PRESENT IN MEDICAL RECORD: ICD-10-PCS | Mod: S$GLB,,, | Performed by: DERMATOLOGY

## 2021-06-08 PROCEDURE — 1157F ADVNC CARE PLAN IN RCRD: CPT | Mod: S$GLB,,, | Performed by: DERMATOLOGY

## 2021-06-08 PROCEDURE — 99999 PR PBB SHADOW E&M-EST. PATIENT-LVL III: CPT | Mod: PBBFAC,,, | Performed by: DERMATOLOGY

## 2021-06-08 PROCEDURE — 99214 PR OFFICE/OUTPT VISIT, EST, LEVL IV, 30-39 MIN: ICD-10-PCS | Mod: 25,S$GLB,, | Performed by: DERMATOLOGY

## 2021-06-08 PROCEDURE — 1101F PT FALLS ASSESS-DOCD LE1/YR: CPT | Mod: CPTII,S$GLB,, | Performed by: DERMATOLOGY

## 2021-06-08 PROCEDURE — 3288F FALL RISK ASSESSMENT DOCD: CPT | Mod: CPTII,S$GLB,, | Performed by: DERMATOLOGY

## 2021-06-08 PROCEDURE — 88305 TISSUE EXAM BY PATHOLOGIST: CPT | Mod: 26,,, | Performed by: PATHOLOGY

## 2021-06-08 PROCEDURE — 11102 TANGNTL BX SKIN SINGLE LES: CPT | Mod: S$GLB,,, | Performed by: DERMATOLOGY

## 2021-06-08 PROCEDURE — 99999 PR PBB SHADOW E&M-EST. PATIENT-LVL III: ICD-10-PCS | Mod: PBBFAC,,, | Performed by: DERMATOLOGY

## 2021-06-08 PROCEDURE — 1125F PR PAIN SEVERITY QUANTIFIED, PAIN PRESENT: ICD-10-PCS | Mod: S$GLB,,, | Performed by: DERMATOLOGY

## 2021-06-08 PROCEDURE — 88305 TISSUE EXAM BY PATHOLOGIST: CPT | Performed by: PATHOLOGY

## 2021-06-08 PROCEDURE — 11102 PR TANGENTIAL BIOPSY, SKIN, SINGLE LESION: ICD-10-PCS | Mod: S$GLB,,, | Performed by: DERMATOLOGY

## 2021-06-08 PROCEDURE — 99214 OFFICE O/P EST MOD 30 MIN: CPT | Mod: 25,S$GLB,, | Performed by: DERMATOLOGY

## 2021-06-08 PROCEDURE — 1125F AMNT PAIN NOTED PAIN PRSNT: CPT | Mod: S$GLB,,, | Performed by: DERMATOLOGY

## 2021-06-08 PROCEDURE — 1101F PR PT FALLS ASSESS DOC 0-1 FALLS W/OUT INJ PAST YR: ICD-10-PCS | Mod: CPTII,S$GLB,, | Performed by: DERMATOLOGY

## 2021-06-08 PROCEDURE — 88305 TISSUE EXAM BY PATHOLOGIST: ICD-10-PCS | Mod: 26,,, | Performed by: PATHOLOGY

## 2021-06-08 PROCEDURE — 1159F MED LIST DOCD IN RCRD: CPT | Mod: S$GLB,,, | Performed by: DERMATOLOGY

## 2021-06-08 PROCEDURE — 3288F PR FALLS RISK ASSESSMENT DOCUMENTED: ICD-10-PCS | Mod: CPTII,S$GLB,, | Performed by: DERMATOLOGY

## 2021-06-08 PROCEDURE — 1159F PR MEDICATION LIST DOCUMENTED IN MEDICAL RECORD: ICD-10-PCS | Mod: S$GLB,,, | Performed by: DERMATOLOGY

## 2021-06-08 RX ORDER — FLUOCINONIDE 0.5 MG/G
CREAM TOPICAL 2 TIMES DAILY
Qty: 45 G | Refills: 0 | Status: SHIPPED | OUTPATIENT
Start: 2021-06-08 | End: 2022-01-01

## 2021-06-09 ENCOUNTER — PATIENT MESSAGE (OUTPATIENT)
Dept: NEPHROLOGY | Facility: CLINIC | Age: 76
End: 2021-06-09

## 2021-06-09 ENCOUNTER — PATIENT MESSAGE (OUTPATIENT)
Dept: ADMINISTRATIVE | Facility: OTHER | Age: 76
End: 2021-06-09

## 2021-06-09 DIAGNOSIS — N18.30 STAGE 3 CHRONIC KIDNEY DISEASE, UNSPECIFIED WHETHER STAGE 3A OR 3B CKD: Primary | ICD-10-CM

## 2021-06-11 ENCOUNTER — PATIENT MESSAGE (OUTPATIENT)
Dept: FAMILY MEDICINE | Facility: CLINIC | Age: 76
End: 2021-06-11

## 2021-06-11 ENCOUNTER — LAB VISIT (OUTPATIENT)
Dept: LAB | Facility: HOSPITAL | Age: 76
End: 2021-06-11
Attending: NURSE PRACTITIONER
Payer: MEDICARE

## 2021-06-11 DIAGNOSIS — E66.01 MORBID OBESITY WITH BODY MASS INDEX OF 45.0-49.9 IN ADULT: Primary | ICD-10-CM

## 2021-06-11 DIAGNOSIS — N18.30 STAGE 3 CHRONIC KIDNEY DISEASE, UNSPECIFIED WHETHER STAGE 3A OR 3B CKD: ICD-10-CM

## 2021-06-11 LAB
25(OH)D3+25(OH)D2 SERPL-MCNC: 27 NG/ML (ref 30–96)
ALBUMIN SERPL BCP-MCNC: 3.5 G/DL (ref 3.5–5.2)
ANION GAP SERPL CALC-SCNC: 10 MMOL/L (ref 8–16)
BASOPHILS # BLD AUTO: 0.06 K/UL (ref 0–0.2)
BASOPHILS NFR BLD: 0.5 % (ref 0–1.9)
BUN SERPL-MCNC: 24 MG/DL (ref 8–23)
CALCIUM SERPL-MCNC: 9.1 MG/DL (ref 8.7–10.5)
CHLORIDE SERPL-SCNC: 104 MMOL/L (ref 95–110)
CO2 SERPL-SCNC: 23 MMOL/L (ref 23–29)
CREAT SERPL-MCNC: 1.3 MG/DL (ref 0.5–1.4)
DIFFERENTIAL METHOD: ABNORMAL
EOSINOPHIL # BLD AUTO: 0.3 K/UL (ref 0–0.5)
EOSINOPHIL NFR BLD: 2.4 % (ref 0–8)
ERYTHROCYTE [DISTWIDTH] IN BLOOD BY AUTOMATED COUNT: 13.8 % (ref 11.5–14.5)
EST. GFR  (AFRICAN AMERICAN): >60 ML/MIN/1.73 M^2
EST. GFR  (NON AFRICAN AMERICAN): 53.8 ML/MIN/1.73 M^2
GLUCOSE SERPL-MCNC: 101 MG/DL (ref 70–110)
HCT VFR BLD AUTO: 36.8 % (ref 40–54)
HGB BLD-MCNC: 11.7 G/DL (ref 14–18)
IMM GRANULOCYTES # BLD AUTO: 0.03 K/UL (ref 0–0.04)
IMM GRANULOCYTES NFR BLD AUTO: 0.3 % (ref 0–0.5)
LYMPHOCYTES # BLD AUTO: 2.5 K/UL (ref 1–4.8)
LYMPHOCYTES NFR BLD: 22.1 % (ref 18–48)
MCH RBC QN AUTO: 32.4 PG (ref 27–31)
MCHC RBC AUTO-ENTMCNC: 31.8 G/DL (ref 32–36)
MCV RBC AUTO: 102 FL (ref 82–98)
MONOCYTES # BLD AUTO: 0.6 K/UL (ref 0.3–1)
MONOCYTES NFR BLD: 5.3 % (ref 4–15)
NEUTROPHILS # BLD AUTO: 7.7 K/UL (ref 1.8–7.7)
NEUTROPHILS NFR BLD: 69.4 % (ref 38–73)
NRBC BLD-RTO: 0 /100 WBC
PHOSPHATE SERPL-MCNC: 2.8 MG/DL (ref 2.7–4.5)
PLATELET # BLD AUTO: 193 K/UL (ref 150–450)
PMV BLD AUTO: 11.3 FL (ref 9.2–12.9)
POTASSIUM SERPL-SCNC: 4.2 MMOL/L (ref 3.5–5.1)
PTH-INTACT SERPL-MCNC: 174 PG/ML (ref 9–77)
RBC # BLD AUTO: 3.61 M/UL (ref 4.6–6.2)
SODIUM SERPL-SCNC: 137 MMOL/L (ref 136–145)
WBC # BLD AUTO: 11.14 K/UL (ref 3.9–12.7)

## 2021-06-11 PROCEDURE — 36415 COLL VENOUS BLD VENIPUNCTURE: CPT | Mod: PO | Performed by: NURSE PRACTITIONER

## 2021-06-11 PROCEDURE — 85025 COMPLETE CBC W/AUTO DIFF WBC: CPT | Performed by: NURSE PRACTITIONER

## 2021-06-11 PROCEDURE — 80069 RENAL FUNCTION PANEL: CPT | Performed by: NURSE PRACTITIONER

## 2021-06-11 PROCEDURE — 82306 VITAMIN D 25 HYDROXY: CPT | Performed by: NURSE PRACTITIONER

## 2021-06-11 PROCEDURE — 83970 ASSAY OF PARATHORMONE: CPT | Performed by: NURSE PRACTITIONER

## 2021-06-14 ENCOUNTER — OFFICE VISIT (OUTPATIENT)
Dept: NEPHROLOGY | Facility: CLINIC | Age: 76
End: 2021-06-14
Payer: MEDICARE

## 2021-06-14 VITALS
SYSTOLIC BLOOD PRESSURE: 140 MMHG | OXYGEN SATURATION: 95 % | WEIGHT: 315 LBS | BODY MASS INDEX: 41.75 KG/M2 | DIASTOLIC BLOOD PRESSURE: 68 MMHG | HEART RATE: 68 BPM | HEIGHT: 73 IN

## 2021-06-14 DIAGNOSIS — I10 HYPERTENSION, UNSPECIFIED TYPE: ICD-10-CM

## 2021-06-14 DIAGNOSIS — R60.9 EDEMA, UNSPECIFIED TYPE: ICD-10-CM

## 2021-06-14 DIAGNOSIS — N18.30 STAGE 3 CHRONIC KIDNEY DISEASE, UNSPECIFIED WHETHER STAGE 3A OR 3B CKD: Primary | ICD-10-CM

## 2021-06-14 DIAGNOSIS — E21.3 HYPERPARATHYROIDISM: ICD-10-CM

## 2021-06-14 DIAGNOSIS — E87.5 HYPERKALEMIA: ICD-10-CM

## 2021-06-14 PROCEDURE — 3008F PR BODY MASS INDEX (BMI) DOCUMENTED: ICD-10-PCS | Mod: CPTII,S$GLB,, | Performed by: NURSE PRACTITIONER

## 2021-06-14 PROCEDURE — 1157F PR ADVANCE CARE PLAN OR EQUIV PRESENT IN MEDICAL RECORD: ICD-10-PCS | Mod: S$GLB,,, | Performed by: NURSE PRACTITIONER

## 2021-06-14 PROCEDURE — 3008F BODY MASS INDEX DOCD: CPT | Mod: CPTII,S$GLB,, | Performed by: NURSE PRACTITIONER

## 2021-06-14 PROCEDURE — 3288F PR FALLS RISK ASSESSMENT DOCUMENTED: ICD-10-PCS | Mod: CPTII,S$GLB,, | Performed by: NURSE PRACTITIONER

## 2021-06-14 PROCEDURE — 99215 PR OFFICE/OUTPT VISIT, EST, LEVL V, 40-54 MIN: ICD-10-PCS | Mod: S$GLB,,, | Performed by: NURSE PRACTITIONER

## 2021-06-14 PROCEDURE — 1126F AMNT PAIN NOTED NONE PRSNT: CPT | Mod: S$GLB,,, | Performed by: NURSE PRACTITIONER

## 2021-06-14 PROCEDURE — 1126F PR PAIN SEVERITY QUANTIFIED, NO PAIN PRESENT: ICD-10-PCS | Mod: S$GLB,,, | Performed by: NURSE PRACTITIONER

## 2021-06-14 PROCEDURE — 99499 RISK ADDL DX/OHS AUDIT: ICD-10-PCS | Mod: S$GLB,,, | Performed by: NURSE PRACTITIONER

## 2021-06-14 PROCEDURE — 1159F PR MEDICATION LIST DOCUMENTED IN MEDICAL RECORD: ICD-10-PCS | Mod: S$GLB,,, | Performed by: NURSE PRACTITIONER

## 2021-06-14 PROCEDURE — 1159F MED LIST DOCD IN RCRD: CPT | Mod: S$GLB,,, | Performed by: NURSE PRACTITIONER

## 2021-06-14 PROCEDURE — 1157F ADVNC CARE PLAN IN RCRD: CPT | Mod: S$GLB,,, | Performed by: NURSE PRACTITIONER

## 2021-06-14 PROCEDURE — 1101F PT FALLS ASSESS-DOCD LE1/YR: CPT | Mod: CPTII,S$GLB,, | Performed by: NURSE PRACTITIONER

## 2021-06-14 PROCEDURE — 1101F PR PT FALLS ASSESS DOC 0-1 FALLS W/OUT INJ PAST YR: ICD-10-PCS | Mod: CPTII,S$GLB,, | Performed by: NURSE PRACTITIONER

## 2021-06-14 PROCEDURE — 3288F FALL RISK ASSESSMENT DOCD: CPT | Mod: CPTII,S$GLB,, | Performed by: NURSE PRACTITIONER

## 2021-06-14 PROCEDURE — 99499 UNLISTED E&M SERVICE: CPT | Mod: S$GLB,,, | Performed by: NURSE PRACTITIONER

## 2021-06-14 PROCEDURE — 99215 OFFICE O/P EST HI 40 MIN: CPT | Mod: S$GLB,,, | Performed by: NURSE PRACTITIONER

## 2021-06-14 PROCEDURE — 99999 PR PBB SHADOW E&M-EST. PATIENT-LVL V: CPT | Mod: PBBFAC,,, | Performed by: NURSE PRACTITIONER

## 2021-06-14 PROCEDURE — 99999 PR PBB SHADOW E&M-EST. PATIENT-LVL V: ICD-10-PCS | Mod: PBBFAC,,, | Performed by: NURSE PRACTITIONER

## 2021-06-15 ENCOUNTER — TELEPHONE (OUTPATIENT)
Dept: FAMILY MEDICINE | Facility: CLINIC | Age: 76
End: 2021-06-15

## 2021-06-16 LAB
FINAL PATHOLOGIC DIAGNOSIS: NORMAL
GROSS: NORMAL
Lab: NORMAL
MICROSCOPIC EXAM: NORMAL

## 2021-06-17 ENCOUNTER — TELEPHONE (OUTPATIENT)
Dept: DERMATOLOGY | Facility: CLINIC | Age: 76
End: 2021-06-17

## 2021-06-17 DIAGNOSIS — J98.4 SMALL AIRWAYS DISEASE: ICD-10-CM

## 2021-06-17 DIAGNOSIS — F33.0 MAJOR DEPRESSIVE DISORDER, RECURRENT EPISODE, MILD: ICD-10-CM

## 2021-06-17 RX ORDER — ALBUTEROL SULFATE 90 UG/1
2 AEROSOL, METERED RESPIRATORY (INHALATION) EVERY 6 HOURS PRN
Qty: 18 G | Refills: 1 | Status: SHIPPED | OUTPATIENT
Start: 2021-06-17 | End: 2022-01-01

## 2021-06-17 RX ORDER — FLUTICASONE FUROATE AND VILANTEROL 100; 25 UG/1; UG/1
1 POWDER RESPIRATORY (INHALATION) DAILY
Qty: 60 EACH | Refills: 3 | Status: SHIPPED | OUTPATIENT
Start: 2021-06-17 | End: 2022-01-01

## 2021-06-17 RX ORDER — VENLAFAXINE 75 MG/1
150 TABLET ORAL 2 TIMES DAILY
Qty: 360 TABLET | Refills: 0 | Status: ON HOLD | OUTPATIENT
Start: 2021-06-17 | End: 2022-01-01 | Stop reason: SDUPTHER

## 2021-06-21 ENCOUNTER — TELEPHONE (OUTPATIENT)
Dept: DERMATOLOGY | Facility: CLINIC | Age: 76
End: 2021-06-21

## 2021-06-23 DIAGNOSIS — B00.52 HERPES SIMPLEX DISCIFORM KERATITIS: ICD-10-CM

## 2021-06-24 RX ORDER — ACYCLOVIR 800 MG/1
800 TABLET ORAL 2 TIMES DAILY
Qty: 180 TABLET | Refills: 6 | Status: SHIPPED | OUTPATIENT
Start: 2021-06-24 | End: 2022-01-01

## 2021-06-28 ENCOUNTER — TELEPHONE (OUTPATIENT)
Dept: DERMATOLOGY | Facility: CLINIC | Age: 76
End: 2021-06-28

## 2021-06-29 ENCOUNTER — PROCEDURE VISIT (OUTPATIENT)
Dept: DERMATOLOGY | Facility: CLINIC | Age: 76
End: 2021-06-29
Payer: MEDICARE

## 2021-06-29 VITALS
DIASTOLIC BLOOD PRESSURE: 63 MMHG | WEIGHT: 315 LBS | BODY MASS INDEX: 41.75 KG/M2 | SYSTOLIC BLOOD PRESSURE: 118 MMHG | HEIGHT: 73 IN | HEART RATE: 69 BPM

## 2021-06-29 DIAGNOSIS — C44.622 SQUAMOUS CELL CARCINOMA OF SKIN OF RIGHT ARM, INCLUDING SHOULDER: Primary | ICD-10-CM

## 2021-06-29 PROCEDURE — 17313 MOHS 1 STAGE T/A/L: CPT | Mod: 51,S$GLB,, | Performed by: DERMATOLOGY

## 2021-06-29 PROCEDURE — 17313: ICD-10-PCS | Mod: 51,S$GLB,, | Performed by: DERMATOLOGY

## 2021-06-29 PROCEDURE — 13121 PR RECMPL WND SCALP,EXTR 2.6-7.5 CM: ICD-10-PCS | Mod: S$GLB,,, | Performed by: DERMATOLOGY

## 2021-06-29 PROCEDURE — 99499 NO LOS: ICD-10-PCS | Mod: S$GLB,,, | Performed by: DERMATOLOGY

## 2021-06-29 PROCEDURE — 99499 UNLISTED E&M SERVICE: CPT | Mod: S$GLB,,, | Performed by: DERMATOLOGY

## 2021-06-29 PROCEDURE — 13121 CMPLX RPR S/A/L 2.6-7.5 CM: CPT | Mod: S$GLB,,, | Performed by: DERMATOLOGY

## 2021-06-29 RX ORDER — HYDROCODONE BITARTRATE AND ACETAMINOPHEN 5; 325 MG/1; MG/1
1 TABLET ORAL EVERY 6 HOURS PRN
Qty: 10 TABLET | Refills: 0 | Status: SHIPPED | OUTPATIENT
Start: 2021-06-29 | End: 2022-01-01

## 2021-06-30 ENCOUNTER — OFFICE VISIT (OUTPATIENT)
Dept: PODIATRY | Facility: CLINIC | Age: 76
End: 2021-06-30
Payer: MEDICARE

## 2021-06-30 VITALS — WEIGHT: 315 LBS | BODY MASS INDEX: 41.75 KG/M2 | HEIGHT: 73 IN

## 2021-06-30 DIAGNOSIS — Z87.2 HEALED ULCER OF LEFT FOOT ON EXAMINATION: ICD-10-CM

## 2021-06-30 DIAGNOSIS — I73.9 PVD (PERIPHERAL VASCULAR DISEASE): Primary | ICD-10-CM

## 2021-06-30 DIAGNOSIS — L84 PRE-ULCERATIVE CALLUSES: ICD-10-CM

## 2021-06-30 DIAGNOSIS — I87.8 VENOUS STASIS OF LOWER EXTREMITY: ICD-10-CM

## 2021-06-30 DIAGNOSIS — L90.9 PLANTAR FAT PAD ATROPHY: ICD-10-CM

## 2021-06-30 DIAGNOSIS — I87.2 VENOUS STASIS DERMATITIS OF BOTH LOWER EXTREMITIES: ICD-10-CM

## 2021-06-30 PROCEDURE — 3008F PR BODY MASS INDEX (BMI) DOCUMENTED: ICD-10-PCS | Mod: CPTII,S$GLB,, | Performed by: PODIATRIST

## 2021-06-30 PROCEDURE — 1126F PR PAIN SEVERITY QUANTIFIED, NO PAIN PRESENT: ICD-10-PCS | Mod: S$GLB,,, | Performed by: PODIATRIST

## 2021-06-30 PROCEDURE — 99213 OFFICE O/P EST LOW 20 MIN: CPT | Mod: S$GLB,,, | Performed by: PODIATRIST

## 2021-06-30 PROCEDURE — 1157F PR ADVANCE CARE PLAN OR EQUIV PRESENT IN MEDICAL RECORD: ICD-10-PCS | Mod: S$GLB,,, | Performed by: PODIATRIST

## 2021-06-30 PROCEDURE — 1101F PR PT FALLS ASSESS DOC 0-1 FALLS W/OUT INJ PAST YR: ICD-10-PCS | Mod: CPTII,S$GLB,, | Performed by: PODIATRIST

## 2021-06-30 PROCEDURE — 1101F PT FALLS ASSESS-DOCD LE1/YR: CPT | Mod: CPTII,S$GLB,, | Performed by: PODIATRIST

## 2021-06-30 PROCEDURE — 1159F MED LIST DOCD IN RCRD: CPT | Mod: S$GLB,,, | Performed by: PODIATRIST

## 2021-06-30 PROCEDURE — 99213 PR OFFICE/OUTPT VISIT, EST, LEVL III, 20-29 MIN: ICD-10-PCS | Mod: S$GLB,,, | Performed by: PODIATRIST

## 2021-06-30 PROCEDURE — 3288F PR FALLS RISK ASSESSMENT DOCUMENTED: ICD-10-PCS | Mod: CPTII,S$GLB,, | Performed by: PODIATRIST

## 2021-06-30 PROCEDURE — 3008F BODY MASS INDEX DOCD: CPT | Mod: CPTII,S$GLB,, | Performed by: PODIATRIST

## 2021-06-30 PROCEDURE — 3288F FALL RISK ASSESSMENT DOCD: CPT | Mod: CPTII,S$GLB,, | Performed by: PODIATRIST

## 2021-06-30 PROCEDURE — 1159F PR MEDICATION LIST DOCUMENTED IN MEDICAL RECORD: ICD-10-PCS | Mod: S$GLB,,, | Performed by: PODIATRIST

## 2021-06-30 PROCEDURE — 99999 PR PBB SHADOW E&M-EST. PATIENT-LVL IV: CPT | Mod: PBBFAC,,, | Performed by: PODIATRIST

## 2021-06-30 PROCEDURE — 99999 PR PBB SHADOW E&M-EST. PATIENT-LVL IV: ICD-10-PCS | Mod: PBBFAC,,, | Performed by: PODIATRIST

## 2021-06-30 PROCEDURE — 1157F ADVNC CARE PLAN IN RCRD: CPT | Mod: S$GLB,,, | Performed by: PODIATRIST

## 2021-06-30 PROCEDURE — 1126F AMNT PAIN NOTED NONE PRSNT: CPT | Mod: S$GLB,,, | Performed by: PODIATRIST

## 2021-07-05 ENCOUNTER — PATIENT MESSAGE (OUTPATIENT)
Dept: ADMINISTRATIVE | Facility: OTHER | Age: 76
End: 2021-07-05

## 2021-07-07 ENCOUNTER — PATIENT MESSAGE (OUTPATIENT)
Dept: PULMONOLOGY | Facility: CLINIC | Age: 76
End: 2021-07-07

## 2021-07-09 ENCOUNTER — TELEPHONE (OUTPATIENT)
Dept: PULMONOLOGY | Facility: CLINIC | Age: 76
End: 2021-07-09

## 2021-07-12 ENCOUNTER — PATIENT MESSAGE (OUTPATIENT)
Dept: PULMONOLOGY | Facility: CLINIC | Age: 76
End: 2021-07-12

## 2021-07-12 ENCOUNTER — TELEPHONE (OUTPATIENT)
Dept: PULMONOLOGY | Facility: CLINIC | Age: 76
End: 2021-07-12

## 2021-07-13 ENCOUNTER — OFFICE VISIT (OUTPATIENT)
Dept: DERMATOLOGY | Facility: CLINIC | Age: 76
End: 2021-07-13
Payer: MEDICARE

## 2021-07-13 ENCOUNTER — PATIENT MESSAGE (OUTPATIENT)
Dept: PULMONOLOGY | Facility: CLINIC | Age: 76
End: 2021-07-13

## 2021-07-13 DIAGNOSIS — Z09 POSTOP CHECK: Primary | ICD-10-CM

## 2021-07-13 DIAGNOSIS — C44.529 SQUAMOUS CELL CARCINOMA, TRUNK: ICD-10-CM

## 2021-07-13 PROCEDURE — 99024 PR POST-OP FOLLOW-UP VISIT: ICD-10-PCS | Mod: S$GLB,,, | Performed by: DERMATOLOGY

## 2021-07-13 PROCEDURE — 87077 CULTURE AEROBIC IDENTIFY: CPT | Performed by: DERMATOLOGY

## 2021-07-13 PROCEDURE — 99024 POSTOP FOLLOW-UP VISIT: CPT | Mod: S$GLB,,, | Performed by: DERMATOLOGY

## 2021-07-13 PROCEDURE — 1157F PR ADVANCE CARE PLAN OR EQUIV PRESENT IN MEDICAL RECORD: ICD-10-PCS | Mod: S$GLB,,, | Performed by: DERMATOLOGY

## 2021-07-13 PROCEDURE — 87186 SC STD MICRODIL/AGAR DIL: CPT | Performed by: DERMATOLOGY

## 2021-07-13 PROCEDURE — 87070 CULTURE OTHR SPECIMN AEROBIC: CPT | Performed by: DERMATOLOGY

## 2021-07-13 PROCEDURE — 1157F ADVNC CARE PLAN IN RCRD: CPT | Mod: S$GLB,,, | Performed by: DERMATOLOGY

## 2021-07-14 RX ORDER — DOXYCYCLINE HYCLATE 100 MG
100 TABLET ORAL EVERY 12 HOURS
Qty: 28 TABLET | Refills: 0 | Status: SHIPPED | OUTPATIENT
Start: 2021-07-14 | End: 2021-07-28

## 2021-07-15 LAB — BACTERIA SPEC AEROBE CULT: ABNORMAL

## 2021-07-21 ENCOUNTER — PATIENT MESSAGE (OUTPATIENT)
Dept: DERMATOLOGY | Facility: CLINIC | Age: 76
End: 2021-07-21

## 2021-08-03 ENCOUNTER — OFFICE VISIT (OUTPATIENT)
Dept: DERMATOLOGY | Facility: CLINIC | Age: 76
End: 2021-08-03
Payer: MEDICARE

## 2021-08-03 DIAGNOSIS — C44.529 SQUAMOUS CELL CARCINOMA, TRUNK: Primary | ICD-10-CM

## 2021-08-03 PROCEDURE — 99212 OFFICE O/P EST SF 10 MIN: CPT | Mod: S$GLB,,, | Performed by: DERMATOLOGY

## 2021-08-03 PROCEDURE — 99212 PR OFFICE/OUTPT VISIT, EST, LEVL II, 10-19 MIN: ICD-10-PCS | Mod: S$GLB,,, | Performed by: DERMATOLOGY

## 2021-08-03 PROCEDURE — 1157F PR ADVANCE CARE PLAN OR EQUIV PRESENT IN MEDICAL RECORD: ICD-10-PCS | Mod: CPTII,S$GLB,, | Performed by: DERMATOLOGY

## 2021-08-03 PROCEDURE — 1157F ADVNC CARE PLAN IN RCRD: CPT | Mod: CPTII,S$GLB,, | Performed by: DERMATOLOGY

## 2021-08-11 ENCOUNTER — TELEPHONE (OUTPATIENT)
Dept: DERMATOLOGY | Facility: CLINIC | Age: 76
End: 2021-08-11

## 2021-09-10 ENCOUNTER — PATIENT MESSAGE (OUTPATIENT)
Dept: NEPHROLOGY | Facility: CLINIC | Age: 76
End: 2021-09-10

## 2021-09-13 ENCOUNTER — PATIENT OUTREACH (OUTPATIENT)
Dept: ADMINISTRATIVE | Facility: OTHER | Age: 76
End: 2021-09-13

## 2021-09-17 ENCOUNTER — TELEPHONE (OUTPATIENT)
Dept: NEPHROLOGY | Facility: CLINIC | Age: 76
End: 2021-09-17

## 2021-09-20 ENCOUNTER — OFFICE VISIT (OUTPATIENT)
Dept: DERMATOLOGY | Facility: CLINIC | Age: 76
End: 2021-09-20
Payer: MEDICARE

## 2021-09-20 DIAGNOSIS — L57.0 AK (ACTINIC KERATOSIS): Primary | ICD-10-CM

## 2021-09-20 DIAGNOSIS — Z85.828 HISTORY OF SKIN CANCER: ICD-10-CM

## 2021-09-20 DIAGNOSIS — L85.3 XEROSIS CUTIS: ICD-10-CM

## 2021-09-20 DIAGNOSIS — L82.1 SK (SEBORRHEIC KERATOSIS): ICD-10-CM

## 2021-09-20 PROBLEM — I25.10 CAD (CORONARY ARTERY DISEASE): Status: RESOLVED | Noted: 2020-02-04 | Resolved: 2021-09-20

## 2021-09-20 PROBLEM — R29.898 LEFT LEG WEAKNESS: Status: RESOLVED | Noted: 2019-02-21 | Resolved: 2021-09-20

## 2021-09-20 PROBLEM — C44.111: Chronic | Status: RESOLVED | Noted: 2020-11-30 | Resolved: 2021-09-20

## 2021-09-20 PROBLEM — L30.2: Status: RESOLVED | Noted: 2020-11-09 | Resolved: 2021-09-20

## 2021-09-20 PROBLEM — M25.672 DECREASED RANGE OF MOTION OF LEFT ANKLE: Status: RESOLVED | Noted: 2017-11-14 | Resolved: 2021-09-20

## 2021-09-20 PROBLEM — R07.9 CHEST PAIN, CARDIAC: Status: RESOLVED | Noted: 2021-04-06 | Resolved: 2021-09-20

## 2021-09-20 PROCEDURE — 1160F RVW MEDS BY RX/DR IN RCRD: CPT | Mod: CPTII,S$GLB,, | Performed by: DERMATOLOGY

## 2021-09-20 PROCEDURE — 1160F PR REVIEW ALL MEDS BY PRESCRIBER/CLIN PHARMACIST DOCUMENTED: ICD-10-PCS | Mod: CPTII,S$GLB,, | Performed by: DERMATOLOGY

## 2021-09-20 PROCEDURE — 1101F PR PT FALLS ASSESS DOC 0-1 FALLS W/OUT INJ PAST YR: ICD-10-PCS | Mod: CPTII,S$GLB,, | Performed by: DERMATOLOGY

## 2021-09-20 PROCEDURE — 3066F PR DOCUMENTATION OF TREATMENT FOR NEPHROPATHY: ICD-10-PCS | Mod: CPTII,S$GLB,, | Performed by: DERMATOLOGY

## 2021-09-20 PROCEDURE — 99213 PR OFFICE/OUTPT VISIT, EST, LEVL III, 20-29 MIN: ICD-10-PCS | Mod: 25,S$GLB,, | Performed by: DERMATOLOGY

## 2021-09-20 PROCEDURE — 17003 DESTRUCTION, PREMALIGNANT LESIONS; SECOND THROUGH 14 LESIONS: ICD-10-PCS | Mod: S$GLB,,, | Performed by: DERMATOLOGY

## 2021-09-20 PROCEDURE — 1159F MED LIST DOCD IN RCRD: CPT | Mod: CPTII,S$GLB,, | Performed by: DERMATOLOGY

## 2021-09-20 PROCEDURE — 17000 DESTRUCT PREMALG LESION: CPT | Mod: S$GLB,,, | Performed by: DERMATOLOGY

## 2021-09-20 PROCEDURE — 1126F PR PAIN SEVERITY QUANTIFIED, NO PAIN PRESENT: ICD-10-PCS | Mod: CPTII,S$GLB,, | Performed by: DERMATOLOGY

## 2021-09-20 PROCEDURE — 1157F ADVNC CARE PLAN IN RCRD: CPT | Mod: CPTII,S$GLB,, | Performed by: DERMATOLOGY

## 2021-09-20 PROCEDURE — 17000 PR DESTRUCTION(LASER SURGERY,CRYOSURGERY,CHEMOSURGERY),PREMALIGNANT LESIONS,FIRST LESION: ICD-10-PCS | Mod: S$GLB,,, | Performed by: DERMATOLOGY

## 2021-09-20 PROCEDURE — 3288F PR FALLS RISK ASSESSMENT DOCUMENTED: ICD-10-PCS | Mod: CPTII,S$GLB,, | Performed by: DERMATOLOGY

## 2021-09-20 PROCEDURE — 1101F PT FALLS ASSESS-DOCD LE1/YR: CPT | Mod: CPTII,S$GLB,, | Performed by: DERMATOLOGY

## 2021-09-20 PROCEDURE — 3288F FALL RISK ASSESSMENT DOCD: CPT | Mod: CPTII,S$GLB,, | Performed by: DERMATOLOGY

## 2021-09-20 PROCEDURE — 99213 OFFICE O/P EST LOW 20 MIN: CPT | Mod: 25,S$GLB,, | Performed by: DERMATOLOGY

## 2021-09-20 PROCEDURE — 1159F PR MEDICATION LIST DOCUMENTED IN MEDICAL RECORD: ICD-10-PCS | Mod: CPTII,S$GLB,, | Performed by: DERMATOLOGY

## 2021-09-20 PROCEDURE — 17003 DESTRUCT PREMALG LES 2-14: CPT | Mod: S$GLB,,, | Performed by: DERMATOLOGY

## 2021-09-20 PROCEDURE — 3066F NEPHROPATHY DOC TX: CPT | Mod: CPTII,S$GLB,, | Performed by: DERMATOLOGY

## 2021-09-20 PROCEDURE — 99999 PR PBB SHADOW E&M-EST. PATIENT-LVL III: CPT | Mod: PBBFAC,,, | Performed by: DERMATOLOGY

## 2021-09-20 PROCEDURE — 1157F PR ADVANCE CARE PLAN OR EQUIV PRESENT IN MEDICAL RECORD: ICD-10-PCS | Mod: CPTII,S$GLB,, | Performed by: DERMATOLOGY

## 2021-09-20 PROCEDURE — 1126F AMNT PAIN NOTED NONE PRSNT: CPT | Mod: CPTII,S$GLB,, | Performed by: DERMATOLOGY

## 2021-09-20 PROCEDURE — 99999 PR PBB SHADOW E&M-EST. PATIENT-LVL III: ICD-10-PCS | Mod: PBBFAC,,, | Performed by: DERMATOLOGY

## 2021-09-23 NOTE — PROGRESS NOTES
"HISTORY OF PRESENT ILLNESS:  Janusz Montgomery Jr. is a 74 y.o. male who presents to the clinic today for a routine physical exam. His last physical exam was approximately 1 years(s) ago.      PAST MEDICAL HISTORY:  Past Medical History:   Diagnosis Date    NAT (acute kidney injury) 3/19/2017    ALLERGIC RHINITIS     Anemia     Anxiety     Basal cell carcinoma 10/19/2018    forehead and right medial shoulder    Chronic rhinitis 5/3/2013    Chronic rhinitis 5/3/2013    Coronary artery disease involving native coronary artery of native heart without angina pectoris s/p RCA stent     Cortical cataract of both eyes 3/18/2016    Delayed sleep phase syndrome 3/13/2019    Depression     Erectile dysfunction 3/24/2014    Erectile dysfunction 3/24/2014    Essential hypertension     GERD (gastroesophageal reflux disease) 7/25/2012    Gout, arthritis     Grade III open fracture of left tibia and fibula s/p ex-fix on 7/1/16 and ORIF of left tibia on 7/15 7/6/2016    H/O: iritis     Helicobacter pylori (H. pylori) infection     Treated    Herpes simplex keratoconjunctivitis 9/30/2015    - on acyclovir - followed by opthalmology, Dr. Uribe     Herpes simplex keratoconjunctivitis 9/30/2015    - on acyclovir - followed by opthalmology, Dr. Uribe     Hyperkalemia 2/28/2017    Hyperlipidemia     Hypogonadism male     Hypogonadism male     Mixed anxiety and depressive disorder     Morbid obesity     Obstructive sleep apnea on CPAP     CPAP    Osteoarthritis of left knee 7/25/2012    Paroxysmal atrial fibrillation 7/6/2016    Primary osteoarthritis of left knee 7/25/2012    Prominent aorta 1/25/2016    "RESULTS: THE HEART IS MILDLY ENLARGED WITH A SLIGHTLY PROMINENT AORTA" - Xray Chest PA & Lateral 12-     Prostate cancer 2/15/2016    - followed by urology, Dr. Young     Prostate cancer 2/15/2016    - followed by urology, Dr. 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    chest and forehead    Squamous cell cancer of skin of nose     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 7/6/2016    Type III open fracture of left tibia and fibula with routine healing 7/6/2016    Vitamin D deficiency disease     Vitreous detachment 3/18/2016       PAST SURGICAL HISTORY:  Past Surgical History:   Procedure Laterality Date    APPLICATION-EXTERNAL FIXATION DEVICE, ankle Left 12/28/2016    Performed by [facility] at [facility] OR [facility]ND [facility]    Cardiac stenting x2      CATARACT EXTRACTION W/  INTRAOCULAR LENS IMPLANT Right 3/29/2016    Dr. Conteh    CATARACT EXTRACTION W/  INTRAOCULAR LENS IMPLANT Left 4/12/2016        DEBRIDEMENT-FOOT Left 4/13/2017    Performed by [facility] at [facility] OR [facility]    EXTERNAL FIXATION TIBIAL FRACTURE Left 07/01/2016    FULL THICKNESS SKIN GRAFT from left shoulder Left 12/8/2015    Performed by Colt Sosa [facility] at [facility] OR [facility]    INSERTION OF IMPLANTABLE LOOP RECORDER  04/07/2017    INSERTION, RADIOACTIVE SEED, PROSTATE N/A 8/8/2018    Performed by Bipin Thompson [facility] at [facility] OR [facility]    INSERTION-INTRAOCULAR LENS (IOL) Left 4/12/2016    Performed by Pool [facility]teh [facility] at [facility] OR [facility]    INSERTION-INTRAOCULAR LENS (IOL) Right 3/29/2016    Performed by Pool [facility]h [facility] at [facility] OR [facility]    MAPPING-ULTRASOUND  6/13/2018    Performed by Bipin Thompson [facility] at [facility] OR [facility]    OPEN REDUCTION INTERNAL FHIILMGG-AKAKIL-ZOWBO/FIBULA Left 4/13/2017    Performed by [facility] at [facility] OR [facility]    OPEN REDUCTION INTERNAL FIXATION-TIBIA Left 7/15/2016    Per[facility] by [facility] at [facility] OR [facility]    ORIF TIBIA FRACTURE Left 07/15/2016    PHACOEMULSIFICATION-ASPIRATION-CATARACT Left 4/12/2016    Performed by Pool [facility]teh MD at [facility] OR 1ST  [facility]R    PHACOEMULSIFICATION-ASPIRATION-CATARACT Right 3/29/2016    [facility]formed [facility] at [facility]    REDUCTION-CLOSED-TIBIA Left 12/28/2016    [facility] at [facility]    REMOVAL OF EXTERNAL FIXATION DEVICE Left 7/15/2016    [facility]    REMOVAL OF EXTERNAL FIXATION DEVICE, LEFT ANKLE C ARM Left 4/13/2017    [facility]    REMOVAL-HARDWARE-LEG Left 4/13/2017    [facility]    REPAIR-MOHS DEFECT Basal Cell Left Lateral Eyebrow Left 12/8/2015    [facility]formed [facility]t [facility] at [facility] OR [facility]ND [facility]    Squamous cell cancer removal x3 with Mohs surgery      TONSILLECTOMY      TOTAL KNEE ARTHROPLASTY  10/2012    trus/bx         SOCIAL HISTORY:  Social History     Socioeconomic History    Marital status:      Spouse name: Not on file    Number of children: Not on file    Years of education: Not on file    Highest education level: Not on file   Occupational History    Occupation: Retired    Social Needs    Financial resource strain: Not on file    Food insecurity:     Worry: Not on file     Inability: Not on file    Transportation needs:     Medical: Not on file     Non-medical: Not on file   Tobacco Use    Smoking status: Never Smoker    Smokeless tobacco: Never Used   Substance and Sexual Activity    Alcohol use: Yes     Comment: occasionally    Drug use: No    Sexual activity: Yes   Lifestyle    Physical activity:     Days per week: Not on file     Minutes per session: Not on file    Stress: Not on file   Relationships    Social connections:     Talks on phone: Not on file     Gets together: Not on file     Attends Taoism service: Not on file     Active member of club or organization: Not on file     Attends meetings of clubs or organizations: Not on file     Relationship status: Not on file   Other  Topics Concern    Not on file   Social History Narrative    Not on file       FAMILY HISTORY:  Family History   Problem Relation Age of Onset    Skin cancer Father     Lung cancer Father     Cancer Father         smoker,     Alzheimer's disease Mother     Hypertension Mother     Cancer Sister         colon, lung cancer     No Known Problems Sister     Cancer Brother         skin cancer, polypectomy     Peripheral vascular disease Unknown     No Known Problems Maternal Aunt     No Known Problems Maternal Uncle     No Known Problems Paternal Aunt     No Known Problems Paternal Uncle     No Known Problems Maternal Grandmother     No Known Problems Maternal Grandfather     No Known Problems Paternal Grandmother     No Known Problems Paternal Grandfather     Melanoma Neg Hx     Psoriasis Neg Hx     Lupus Neg Hx     Eczema Neg Hx     Amblyopia Neg Hx     Blindness Neg Hx     Cataracts Neg Hx     Diabetes Neg Hx     Glaucoma Neg Hx     Macular degeneration Neg Hx     Retinal detachment Neg Hx     Strabismus Neg Hx     Stroke Neg Hx     Thyroid disease Neg Hx     Acne Neg Hx        ALLERGIES AND MEDICATIONS: updated and reviewed.  Review of patient's allergies indicates:   Allergen Reactions    Ciprofloxacin Rash     Diffuse pruritic morbilliform rash developed 3/15/2017 after dose of cipro; previously in 2/2017 he had rash/fevers after initiation of cipro    Zosyn [piperacillin-tazobactam] Rash     Diffuse pruritic morbilliform rash developed 3/15/2017.  Then, 430am dose on 3/16 and rash worsened with SOB/tachypnea but no hypoxemia.     Bacitracin Itching and Rash     Violaceous rash in area of topical Tx.      Medication List with Changes/Refills   Current Medications    ACYCLOVIR (ZOVIRAX) 800 MG TAB    Take 1 tablet (800 mg total) by mouth 2 (two) times daily.    ALBUTEROL (PROVENTIL/VENTOLIN HFA) 90 MCG/ACTUATION INHALER    Inhale 2 puffs into the lungs every 6 (six) hours as needed  for Wheezing. Rescue    APIXABAN (ELIQUIS) 5 MG TAB    Take 1 tablet (5 mg total) by mouth 2 (two) times daily.    ARTIFICIAL TEARS (ISOPTO TEARS) 0.5 % OPHTHALMIC SOLUTION    Place 1 drop into both eyes as needed.    ASPIRIN (ECOTRIN) 81 MG EC TABLET    Take 1 tablet (81 mg total) by mouth once daily.    ATORVASTATIN (LIPITOR) 40 MG TABLET    Take 1 tablet (40 mg total) by mouth once daily. Please make a follow up visit with your doctor for more refills of this medication    AZELASTINE (ASTELIN) 137 MCG (0.1 %) NASAL SPRAY    1 spray (137 mcg total) by Nasal route 2 (two) times daily.    AZITHROMYCIN (Z-JESSICA) 250 MG TABLET    Take 2 tablets by mouth on day 1; Take 1 tablet by mouth on days 2-5    CYANOCOBALAMIN, VITAMIN B-12, (VITAMIN B-12 ORAL)    Take 2,500 mcg by mouth once daily.    ERYTHROMYCIN (ROMYCIN) OPHTHALMIC OINTMENT    Place into both eyes 3 (three) times daily.    FLUTICASONE PROPIONATE (FLONASE) 50 MCG/ACTUATION NASAL SPRAY    1 spray (50 mcg total) by Each Nare route once daily.    FUROSEMIDE (LASIX) 20 MG TABLET    Take 1 tablet (20 mg total) by mouth once daily.    GABAPENTIN (NEURONTIN) 300 MG CAPSULE    Take 1 capsule (300 mg total) by mouth 2 (two) times daily.    MELATONIN 5 MG TAB    Take 10 mg by mouth nightly.    MULTIVITAMIN (MULTIPLE VITAMINS) PER TABLET    Take 1 tablet by mouth once daily.    OMEPRAZOLE (PRILOSEC) 20 MG CAPSULE    Take 1 capsule (20 mg total) by mouth daily as needed.    OXYCODONE-ACETAMINOPHEN (PERCOCET) 5-325 MG PER TABLET    Take 1 tablet by mouth every 4 to 6 hours as needed for Pain.    PREDNISOLONE ACETATE (PRED FORTE) 1 % DRPS    Place 1 drop into both eyes 4 (four) times daily. 4x/day for 1 wk, 3x/day for 4 days, 2x/day for 4 days, 1x/day for 4 days then STOP    SILDENAFIL (VIAGRA) 100 MG TABLET    Take 1 tablet (100 mg total) by mouth daily as needed for Erectile Dysfunction.    TAMSULOSIN (FLOMAX) 0.4 MG CAP    Take 1 capsule (0.4 mg total) by mouth once daily.     VENLAFAXINE (EFFEXOR) 75 MG TABLET    TAKE 2 TABLETS BY MOUTH TWICE DAILY    VITAMIN D 1000 UNITS TAB    Take 1 tablet (1,000 Units total) by mouth once daily.   Changed and/or Refilled Medications    Modified Medication Previous Medication    OLMESARTAN (BENICAR) 20 MG TABLET olmesartan (BENICAR) 20 MG tablet       Take 1 tablet (20 mg total) by mouth once daily. (replaces ramipril for blood pressure)    Take 1 tablet (20 mg total) by mouth once daily. (replaces ramipril for blood pressure)         CARE TEAM:  Patient Care Team:  Miguelina Weathers MD as PCP - General (Internal Medicine)  Navdeep Guardado MD (Cardiology)  Luis Palacio MD as Consulting Physician (Cardiology)  Jessica Herndon MD (Inactive) (Nephrology)  Colt Sosa MD as Consulting Physician (Plastic Surgery)  AURELIANO Minor MD as Consulting Physician (Urology)  Walter Cortés MD as Consulting Physician (Orthopedic Surgery)  Miguelina Weathers MD as Hypertension Digital Medicine Responsible Provider (Internal Medicine)  Jocelyne Monge, PharmD as Hypertension Digital Medicine Clinician (Pharmacist)  Miguelina Kitchen as Digital Medicine Health   Miguel Smith MD as Consulting Physician (INTERVENTIONAL CARDIOLOGY)  Naomy Anaya LPN as Licensed Practical Nurse           SCREENING HISTORY:  Health Maintenance       Date Due Completion Date    TETANUS VACCINE 01/23/1963 ---    Shingles Vaccine (2 of 3) 07/27/2009 6/1/2009    Colonoscopy 06/21/2017 6/21/2007    Influenza Vaccine 08/01/2019 10/30/2018    Override on 4/24/2015: Declined    Override on 10/1/2013: Done    Lipid Panel 11/28/2019 11/28/2018    PROSTATE-SPECIFIC ANTIGEN 03/21/2020 3/21/2019    High Dose Statin 06/26/2020 6/26/2019            REVIEW OF SYSTEMS:   The patient reports poor dietary habits.  The patient does not exercise regularly.  Review of Systems   Constitutional: Positive for fatigue (- despite using his CPAP). Negative for chills, fever and  "unexpected weight change.   HENT: Positive for congestion (- chronic; intermittent). Negative for ear pain, sore throat and voice change.    Eyes: Negative for photophobia, pain, discharge and visual disturbance.   Respiratory: Positive for shortness of breath (- mostly with exertion/heat). Negative for cough and wheezing.    Cardiovascular: Negative for chest pain, palpitations and leg swelling.   Gastrointestinal: Negative for abdominal pain, blood in stool, constipation, diarrhea, nausea and vomiting.   Genitourinary: Negative for dysuria and frequency.   Musculoskeletal: Positive for arthralgias and back pain. Negative for gait problem, joint swelling and neck stiffness.   Skin: Negative for color change and rash.   Neurological: Positive for headaches (- ?due to neck pain/congestion; symptoms better since using allergy medication). Negative for seizures and weakness.   Hematological: Negative for adenopathy. Does not bruise/bleed easily.   Psychiatric/Behavioral: Positive for dysphoric mood and sleep disturbance (- he has poor sleep hygiene). Negative for behavioral problems. The patient is not nervous/anxious.            PHYSICAL EXAM:  Vitals:    06/26/19 1508   BP: (!) 114/56   Pulse: 86   Temp: 97.7 °F (36.5 °C)     Weight: (!) 158 kg (348 lb 5.2 oz)   Height: 6' 1" (185.4 cm)   Body mass index is 45.96 kg/m².    Physical Examination: General appearance - alert, well appearing, and in no distress and morbidly obese  Mental status - alert, oriented to person, place, and time, normal mood, behavior, speech, dress, motor activity, and thought processes  Eyes - pupils equal and reactive, extraocular eye movements intact, sclera anicteric  Ears - bilateral TM's and external ear canals normal  Nose - normal nontender sinuses  Mouth - mucous membranes moist, pharynx normal without lesions  Neck - supple, no significant adenopathy, carotids upstroke normal bilaterally, no bruits  Lymphatics - no palpable " lymphadenopathy  Chest - clear to auscultation, no wheezes, rales or rhonchi, symmetric air entry  Heart - normal rate and regular rhythm, no gallops noted  Abdomen - not examined   Male - not examined  Back exam - decreased range of motion due to body habitus; in-depth exam deferred  Neurological - alert, oriented, normal speech, no focal findings or movement disorder noted, cranial nerves II through XII intact  Musculoskeletal - no muscular tenderness noted, Moderate osteoarthritic changes noted to both knee joints. No joint effusions noted.   Extremities - pedal edema trace-1 +  Skin - normal coloration and turgor, no rashes, no suspicious skin lesions noted       ASSESSMENT AND PLAN:  1. Routine medical exam  Counseled on age appropriate medical preventative services including age appropriate cancer screenings, age appropriate eye and dental exams, over all nutritional health, need for a consistent exercise regimen, and an over all push towards maintaining a vigorous and active lifestyle.  Counseled on age appropriate vaccines and discussed upcoming health care needs based on age/gender. Discussed good sleep hygiene and stress management.     2. Essential hypertension  Discussed sodium restriction, maintaining ideal body weight and regular exercise program as physiologic means to achieve blood pressure control. The patient will strive towards this. The current medical regimen is effective;  continue present plan and medications. Recommended patient to check home readings to monitor and see me for followup as scheduled or sooner as needed. Patient was educated that both decongestant and anti-inflammatory medication may raise blood pressure.   - olmesartan (BENICAR) 20 MG tablet; Take 1 tablet (20 mg total) by mouth once daily. (replaces ramipril for blood pressure)  Dispense: 90 tablet; Refill: 1    3. Paroxysmal atrial fibrillation   Currently in NSR. Followed by cardiology.    4. Hyperlipidemia, unspecified  hyperlipidemia type  We discussed low fat diet and regular exercise.The current medical regimen is effective;  continue present plan and medications.      5. Obstructive sleep apnea on CPAP  CPAP compliance: yes.  We discussed the potential ramifications of untreated sleep apnea, which could include daytime sleepiness, hypertension, heart disease including CHF, sudden death while sleeping and/or stroke. The patient was advised to abstain from driving should they feel sleepy or drowsy.  We discussed potential treatment options, which could include weight loss, body positioning, continuous positive airway pressure (CPAP), or referral for surgical consideration.      6. Gastroesophageal reflux disease, esophagitis presence not specified  Symptoms controlled: yes. Reflux precautions discussed (eliminate tobacco if a smoker; minimize caffeine, chocolate and red/white peppermint intake; avoid heavy and spicy meals; don't lay down within 2-3 hours after eating; minimize the intake of NSAIDs). Medication as needed. Patient asked to take medication breaks, if possible - discussed chronic use can limit calcium absorption (which can lead to osteopenia/osteoporosis), increases the risk for intestinal infections, and can cause kidney damage. There are also some newer studies that show possible increased risk of mortality.     7. Prostate cancer  Stable. Followed by urology.    8. Major depressive disorder, recurrent episode, mild  The current medical regimen is effective;  continue present plan and medications.     9. Insomnia due to medical condition  We discussed sleep hygiene including going to bed at the same time every night, having a bedtime routine, avoiding bright lights in the 2-3 hours before bedtime, making sure the bedroom is completely dark when going to bed and not watching TV or reading in bed (preferably no tv in the bedroom), taking melatonin up to 10 mg 30 minutes prior to going to sleep, and regular daily  exercise. We discussed only sleeping for 7-8 hours every night and no daytime napping. Avoid caffeine after the noon hour.    He is seeing a sleep specialist.    10. PVD (peripheral vascular disease)/11. Bilateral leg edema  Stable.  Observe.  I encouraged weight loss.    12. Morbid obesity with BMI of 40.0-44.9, adult  The patient is asked to make an attempt to improve diet and exercise patterns to aid in medical management of this problem.     13. Dyspnea, unspecified type  Awaiting workup with CT scan and 2D ECHO.    14. Need for Tdap vaccination  Patient was advised to get immunization at the pharmacy.     15. Need for shingles vaccine  Patient was advised to get immunization at the pharmacy.        For review of systems:  He has been having some pain in his neck.  It has gotten better with treating his allergies.  Consider referral to Pain Management further evaluation and treatment if symptoms worsen or persist.  We did discuss ergonomics as he does a lot of playing on his computer and cell phone.      Follow up in about 6 months (around 12/26/2019), or if symptoms worsen or fail to improve, for follow up chronic medical conditions.. or sooner as needed.        Answers for HPI/ROS submitted by the patient on 6/24/2019   Back pain  Chronicity: new  Onset: more than 1 month ago  Frequency: 2 to 4 times per day  Progression since onset: waxing and waning  Pain location: thoracic spine  Pain quality: aching  Radiates to: does not radiate  Pain - numeric: 3/10  Pain is: worse during the night  Aggravated by: position  Stiffness is present: in the morning  Risk factors: history of cancer, history of osteoporosis, history of steroid use, lack of exercise, obesity, poor posture, sedentary lifestyle  Pain severity: mild  Treatments tried: NSAIDs  Improvement on treatment: moderate     Methotrexate Pregnancy And Lactation Text: This medication is Pregnancy Category X and is known to cause fetal harm. This medication is excreted in breast milk.

## 2021-09-24 ENCOUNTER — TELEPHONE (OUTPATIENT)
Dept: ADMINISTRATIVE | Facility: HOSPITAL | Age: 76
End: 2021-09-24

## 2021-09-24 ENCOUNTER — PATIENT OUTREACH (OUTPATIENT)
Dept: ADMINISTRATIVE | Facility: HOSPITAL | Age: 76
End: 2021-09-24

## 2021-09-28 ENCOUNTER — HOSPITAL ENCOUNTER (OUTPATIENT)
Dept: RADIOLOGY | Facility: HOSPITAL | Age: 76
Discharge: HOME | End: 2021-09-28
Attending: PODIATRIST
Payer: MEDICARE

## 2021-09-28 ENCOUNTER — PATIENT MESSAGE (OUTPATIENT)
Dept: PODIATRY | Facility: CLINIC | Age: 76
End: 2021-09-28

## 2021-09-28 ENCOUNTER — OFFICE VISIT (OUTPATIENT)
Dept: PODIATRY | Facility: CLINIC | Age: 76
End: 2021-09-28
Payer: MEDICARE

## 2021-09-28 VITALS — HEIGHT: 73 IN | WEIGHT: 315 LBS | BODY MASS INDEX: 41.75 KG/M2

## 2021-09-28 DIAGNOSIS — L90.9 PLANTAR FAT PAD ATROPHY: ICD-10-CM

## 2021-09-28 DIAGNOSIS — L97.522 TOE ULCER, LEFT, WITH FAT LAYER EXPOSED: ICD-10-CM

## 2021-09-28 DIAGNOSIS — I73.9 PVD (PERIPHERAL VASCULAR DISEASE): Primary | ICD-10-CM

## 2021-09-28 DIAGNOSIS — L84 PRE-ULCERATIVE CALLUSES: ICD-10-CM

## 2021-09-28 PROCEDURE — 99214 PR OFFICE/OUTPT VISIT, EST, LEVL IV, 30-39 MIN: ICD-10-PCS | Mod: S$GLB,,, | Performed by: PODIATRIST

## 2021-09-28 PROCEDURE — 1125F PR PAIN SEVERITY QUANTIFIED, PAIN PRESENT: ICD-10-PCS | Mod: CPTII,S$GLB,, | Performed by: PODIATRIST

## 2021-09-28 PROCEDURE — 1160F RVW MEDS BY RX/DR IN RCRD: CPT | Mod: CPTII,S$GLB,, | Performed by: PODIATRIST

## 2021-09-28 PROCEDURE — 1125F AMNT PAIN NOTED PAIN PRSNT: CPT | Mod: CPTII,S$GLB,, | Performed by: PODIATRIST

## 2021-09-28 PROCEDURE — 99214 OFFICE O/P EST MOD 30 MIN: CPT | Mod: S$GLB,,, | Performed by: PODIATRIST

## 2021-09-28 PROCEDURE — 1160F PR REVIEW ALL MEDS BY PRESCRIBER/CLIN PHARMACIST DOCUMENTED: ICD-10-PCS | Mod: CPTII,S$GLB,, | Performed by: PODIATRIST

## 2021-09-28 PROCEDURE — 1159F PR MEDICATION LIST DOCUMENTED IN MEDICAL RECORD: ICD-10-PCS | Mod: CPTII,S$GLB,, | Performed by: PODIATRIST

## 2021-09-28 PROCEDURE — 1101F PT FALLS ASSESS-DOCD LE1/YR: CPT | Mod: CPTII,S$GLB,, | Performed by: PODIATRIST

## 2021-09-28 PROCEDURE — 87070 CULTURE OTHR SPECIMN AEROBIC: CPT | Performed by: PODIATRIST

## 2021-09-28 PROCEDURE — 3066F NEPHROPATHY DOC TX: CPT | Mod: CPTII,S$GLB,, | Performed by: PODIATRIST

## 2021-09-28 PROCEDURE — 99999 PR PBB SHADOW E&M-EST. PATIENT-LVL V: CPT | Mod: PBBFAC,,, | Performed by: PODIATRIST

## 2021-09-28 PROCEDURE — 3008F BODY MASS INDEX DOCD: CPT | Mod: CPTII,S$GLB,, | Performed by: PODIATRIST

## 2021-09-28 PROCEDURE — 3288F PR FALLS RISK ASSESSMENT DOCUMENTED: ICD-10-PCS | Mod: CPTII,S$GLB,, | Performed by: PODIATRIST

## 2021-09-28 PROCEDURE — 3008F PR BODY MASS INDEX (BMI) DOCUMENTED: ICD-10-PCS | Mod: CPTII,S$GLB,, | Performed by: PODIATRIST

## 2021-09-28 PROCEDURE — 73630 XR FOOT COMPLETE 3 VIEW LEFT: ICD-10-PCS | Mod: 26,LT,, | Performed by: RADIOLOGY

## 2021-09-28 PROCEDURE — 99999 PR PBB SHADOW E&M-EST. PATIENT-LVL V: ICD-10-PCS | Mod: PBBFAC,,, | Performed by: PODIATRIST

## 2021-09-28 PROCEDURE — 73630 X-RAY EXAM OF FOOT: CPT | Mod: TC,FY,PO,LT

## 2021-09-28 PROCEDURE — 1101F PR PT FALLS ASSESS DOC 0-1 FALLS W/OUT INJ PAST YR: ICD-10-PCS | Mod: CPTII,S$GLB,, | Performed by: PODIATRIST

## 2021-09-28 PROCEDURE — 1157F PR ADVANCE CARE PLAN OR EQUIV PRESENT IN MEDICAL RECORD: ICD-10-PCS | Mod: CPTII,S$GLB,, | Performed by: PODIATRIST

## 2021-09-28 PROCEDURE — 1159F MED LIST DOCD IN RCRD: CPT | Mod: CPTII,S$GLB,, | Performed by: PODIATRIST

## 2021-09-28 PROCEDURE — 87075 CULTR BACTERIA EXCEPT BLOOD: CPT | Performed by: PODIATRIST

## 2021-09-28 PROCEDURE — 3288F FALL RISK ASSESSMENT DOCD: CPT | Mod: CPTII,S$GLB,, | Performed by: PODIATRIST

## 2021-09-28 PROCEDURE — 1157F ADVNC CARE PLAN IN RCRD: CPT | Mod: CPTII,S$GLB,, | Performed by: PODIATRIST

## 2021-09-28 PROCEDURE — 3066F PR DOCUMENTATION OF TREATMENT FOR NEPHROPATHY: ICD-10-PCS | Mod: CPTII,S$GLB,, | Performed by: PODIATRIST

## 2021-09-28 PROCEDURE — 73630 X-RAY EXAM OF FOOT: CPT | Mod: 26,LT,, | Performed by: RADIOLOGY

## 2021-09-29 ENCOUNTER — HOSPITAL ENCOUNTER (OUTPATIENT)
Dept: CARDIOLOGY | Facility: HOSPITAL | Age: 76
Discharge: HOME | End: 2021-09-29
Attending: SURGERY
Payer: MEDICARE

## 2021-09-29 DIAGNOSIS — I73.9 ASYMPTOMATIC PVD (PERIPHERAL VASCULAR DISEASE): ICD-10-CM

## 2021-09-29 DIAGNOSIS — I65.23 CAROTID STENOSIS, ASYMPTOMATIC, BILATERAL: ICD-10-CM

## 2021-09-29 DIAGNOSIS — I87.303 VENOUS HYPERTENSION OF LOWER EXTREMITY, BILATERAL: ICD-10-CM

## 2021-09-29 PROCEDURE — 93922 UPR/L XTREMITY ART 2 LEVELS: CPT | Mod: 26,,, | Performed by: SURGERY

## 2021-09-29 PROCEDURE — 93922 UPR/L XTREMITY ART 2 LEVELS: CPT

## 2021-09-29 PROCEDURE — 93922 ANKLE BRACHIAL INDICES (ABI): ICD-10-PCS | Mod: 26,,, | Performed by: SURGERY

## 2021-09-30 LAB — BACTERIA SPEC AEROBE CULT: NORMAL

## 2021-10-02 DIAGNOSIS — E78.5 HYPERLIPIDEMIA, UNSPECIFIED HYPERLIPIDEMIA TYPE: ICD-10-CM

## 2021-10-04 ENCOUNTER — TELEPHONE (OUTPATIENT)
Dept: FAMILY MEDICINE | Facility: CLINIC | Age: 76
End: 2021-10-04

## 2021-10-04 DIAGNOSIS — I48.0 PAROXYSMAL ATRIAL FIBRILLATION: ICD-10-CM

## 2021-10-04 RX ORDER — GENTAMICIN SULFATE 1 MG/G
OINTMENT TOPICAL
Status: ON HOLD | COMMUNITY
Start: 2021-09-19 | End: 2022-01-01 | Stop reason: HOSPADM

## 2021-10-04 RX ORDER — AMINOPHYLLINE 25 MG/ML
INJECTION, SOLUTION INTRAVENOUS
Status: ON HOLD | COMMUNITY
Start: 2021-04-21 | End: 2022-01-01 | Stop reason: HOSPADM

## 2021-10-04 RX ORDER — DIPYRIDAMOLE 5 MG/ML
INJECTION INTRAVENOUS
COMMUNITY
Start: 2021-04-21 | End: 2022-01-01

## 2021-10-04 RX ORDER — SULFAMETHOXAZOLE AND TRIMETHOPRIM 800; 160 MG/1; MG/1
1 TABLET ORAL 2 TIMES DAILY
COMMUNITY
Start: 2021-09-13 | End: 2022-01-01 | Stop reason: ALTCHOICE

## 2021-10-04 RX ORDER — DOXYCYCLINE 100 MG/1
100 CAPSULE ORAL DAILY
COMMUNITY
Start: 2021-09-16 | End: 2022-01-01 | Stop reason: ALTCHOICE

## 2021-10-04 RX ORDER — ATORVASTATIN CALCIUM 40 MG/1
40 TABLET, FILM COATED ORAL DAILY
Qty: 90 TABLET | Refills: 1 | OUTPATIENT
Start: 2021-10-04

## 2021-10-05 LAB — BACTERIA SPEC ANAEROBE CULT: NORMAL

## 2021-12-29 NOTE — PATIENT INSTRUCTIONS
Supplements for inflammation: Arnica Tabs, bromelain with tumeric, alpha lipoic acid, garlic     Over the counter pain creams: Biofreeze, Bengay, tiger balm, two old goat, lidocaine gel,  Absorbine Veterinary Liniment Gel Topical Analgesic Sore Muscle and Joint Pain Relief    Recommend lotions: eucerin, eucerin for diabetics, aquaphor, A&D ointment, gold bond for diabetics, sween, Dexter's Bees all purpose baby ointment,  urea 40 with aloe (found on amazon.com)    Shoe recommendations: (try 6pm.com, zappos[employer]Retail Convergence , nordstromrack.Retail Convergence, or shoes[employer]ail [employer] for discounted prices) you can visit [employer] in [place]  or [employer] in the [facility] (there are also several shoe brand outlets in the [facility])    Asics (GT 2000 or gel foundations), new balance stability type shoes (such as the 940 series), saucony (stabil c3),  Knight (GTS or Beast or transcend), propet (tennis shoe)    Sofft Brand (women) Jaoquin&Ike (men), clarks, crocs, aerosoles, naturalizers, SAS, ecco, born, sindy doan, rockports (dress shoes)    Vionic, burkenstocks, fitflops, propet (sandals)  Nike comfort thong sandals, crocs, propet (house shoes)    Nail Home remedy:  Vicks Vapor rub or Emuaid to nails for easier manageability    Occasional soaks for 15-20 mins in luke warm water with 1/2 cup of listerine and 1 cup of apple cider vinegar are ok You may add several drops of oil of oregano or tea tree oil as well      What Is Arthritis in the Foot?  Degenerative arthritis is a condition that slowly wears away joints, the area where bones meet and move. In the beginning, you may notice that the affected joint seems stiff. It may even ache. As the joint lining (cartilage) breaks down, the bones rub against each other, causing pain and swelling. Over time, small pieces of rough or splintered bone (bone spurs) develop, and the joints range of motion becomes limited. But movement doesnt have to cause pain. The effects of arthritis can be reduced.    The  big-toe joint  When arthritis affects your big toe, your foot hurts when it pushes off the ground. Arthritis often appears in the big-toe joint along with a bunion (a bony bump at the side of the joint) or a bone spur on top of the joint.    Other joints  When arthritis affects the rear or midfoot joints, you feel pain when you put weight on your foot. Arthritis may affect the joint where the ankle and foot meet. It may also affect other joints nearby.  Date Last Reviewed: 7/1/2016 © 2000-2017 [employer]. [address] [place] [id number]. All rights reserved. This information is not intended as a substitute for professional medical care. Always follow your healthcare professional's instructions.        Treating Arthritis in the Foot  If your symptoms are mild, medications may be enough to reduce pain and swelling. For more severe arthritis, surgery may be needed to improve the condition of the joint.    Medicine  Your doctor may prescribe medicine--pills or injections--to limit pain and swelling. Ice, aspirin, acetaminophen, or ibuprofen may help relieve mild symptoms that occur after activity.  Surgery and bone trimming  To ease movement and reduce pain, your doctor may trim damaged bone. If arthritis is severe, the joint may be fused or removed. If the bone is not damaged too badly, your doctor may simply shave away bone spurs. Any excess bone growth related to a bunion may also be trimmed.  Fusing joints  If damage is more severe, your doctor may fuse the joint to prevent the bones from rubbing. Afterward, staples, plates, or screws may hold the bones in place so they heal properly. In some cases, the joint may be removed and replaced with an implant.  After surgery  During the early stages of recovery, your foot is likely to be bandaged and immobilized for a while. For best results, follow up with your doctor as scheduled. These visits help ensure that your foot heals properly.  As you  heal  After surgery, youll be told how to care for your incision and how soon to begin walking on the foot. Until the foot can bear weight, you may need to walk with crutches or a cane.  For surgery on the big toe, your foot may be splinted to limit movement for several weeks. Despite this, you should be able to walk soon after surgery.  For surgery on rear or midfoot joints, you may need to wear a cast or surgical shoe. These joints are fairly large, so full recovery may take a few months. Once the bone has healed, any staples, plates, or screws may be removed.  Date Last Reviewed: 7/1/2016  © 5690-2993 [employer]. [address] [place] [address], [place] [address]. All rights reserved. This information is not intended as a substitute for professional medical care. Always follow your healthcare professional's instructions.        Foot Surgery: Degenerative Joint Disease    Degenerative joint disease (arthritis) often happens in the joint of a big toe. This bone growth may cause pain and stiffness in the joint. Left untreated, arthritis can break down the cartilage and destroy the joint. Your treatment choices depend on how damaged your joint is. There are many nonsurgical treatments, but if these are not helpful, surgery may be considered.    Cheilectomy  This is done when the arthritic joint and cartilage can be saved. A bone spur caused by arthritis may be symptomatic on the top of the big toe joint. The procedure involves removing this bone spur, usually with a small part of the top of the joint itself.  You will need to wear a surgical shoe for several weeks. Once the foot heals, joint movement is restored.    Fusion  In fusion, the cartilage and some bone on both sides of the joint are removed. Then, the big toe and metatarsal bones are held together with staples, screws, or a plate and screws. Your foot may be placed in a cast. While you heal, you will be asked not to bear weight on this foot. You may  also need crutches for several weeks. Because the joint has been removed, your toe will be less flexible.    Arthroplasty  During surgery, bone growth caused by the arthritis is trimmed, and part of the joint is removed. A pin can be used to align the bones and to keep them from touching. The pin is removed after several weeks. In some cases, the entire joint may be replaced with an implant. You may have to wear a splint or a surgical shoe for several weeks. When healed, the bones become connected with scar tissue.  Date Last Reviewed: 10/15/2015  © 5761-9342 [employer]. [address] [place] [address], [place] [address]. All rights reserved. This information is not intended as a substitute for professional medical care. Always follow your healthcare professional's instructions.

## 2021-12-29 NOTE — PROGRESS NOTES
Subjective:      Patient ID: Janusz Montgomery Jr. is a 76 y.o. male.    Chief Complaint: Peripheral Vascular Disease and Nail Care    Janusz Motngomery Jr. is a 76 y.o. male returns to clinic for follow up of left foot callus formation where wound was present and concern on if ulceration may be present beneath callus.   Patient has not been moisturizing skin daily.  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 No new pedal complaints.    9/28/2021  presents to the podiatry clinic for care of  left foot ulcer.   Location: plantar distal hallux Onset of the symptoms was several weeks ago. Precipitating event: none known.  History of injury: no Current symptoms include: redness. Signs of infection none   Symptoms have progressed to a point and plateaued. Patient has had prior foot problems. Evaluation to date: seen in ED and states underent I&D and placed on abx Patients rates pain 4/10 on pain scale.      10/12/2021  patient relates he has been attempting to care for toe as instructed.  He relates continued pain top the sites and increased callus formation per wife;. He relates he believes darco shoe may be too large.  Following last encounter had vascular studies.  Vascular surgery consult pending    10/28/2021 Patient relates he has been attempting to care for toe as instructed.  Following last encounter seen by vascular surgery, rendered opinion the patient should be able to heal wound.     12/29/2021 Patient relates he has been attempting to care for toe as instructed. He relates subjective healing.  He has yet to acquire any of the recommended shoe gear. He complains of frequent falls and near falls due to weakness and loss of balance.        Chief Complaint   Patient presents with    Peripheral Vascular Disease    Nail Care       Hemoglobin A1C   Date Value Ref Range Status   02/11/2020 5.6 4.0 - 5.6 % Final     Comment:     ADA Screening Guidelines:  5.7-6.4%  Consistent with prediabetes  >or=6.5%  Consistent with  diabetes  High levels of fetal hemoglobin interfere with the HbA1C  assay. Heterozygous hemoglobin variants (HbS, HgC, etc)do  not significantly interfere with this assay.   However, presence of multiple variants may affect accuracy.     07/11/2016 5.3 4.5 - 6.2 % Final     Comment:     According to ADA guidelines, hemoglobin A1C <7.0% represents  optimal control in non-pregnant diabetic patients.  Different  metrics may apply to specific populations.   Standards of Medical Care in Diabetes - 2016.  For the purpose of screening for the presence of diabetes:  <5.7%     Consistent with the absence of diabetes  5.7-6.4%  Consistent with increasing risk for diabetes   (prediabetes)  >or=6.5%  Consistent with diabetes  Currently no consensus exists for use of hemoglobin A1C  for diagnosis of diabetes for children.     03/31/2015 5.3 4.5 - 6.2 % Final         Current Outpatient Medications on File Prior to Visit   Medication Sig Dispense Refill    acyclovir (ZOVIRAX) 800 MG Tab Take 1 tablet (800 mg total) by mouth 2 (two) times daily. 180 tablet 6    albuterol (PROVENTIL/VENTOLIN HFA) 90 mcg/actuation inhaler Inhale 2 puffs into the lungs every 6 (six) hours as needed for Wheezing. Rescue 18 g 1    aminophylline 250 mg/10 mL injection       apixaban (ELIQUIS) 5 mg Tab Take 1 tablet (5 mg total) by mouth 2 (two) times daily. 180 tablet 4    artificial tears (ISOPTO TEARS) 0.5 % ophthalmic solution Place 1 drop into both eyes as needed.      atorvastatin (LIPITOR) 40 MG tablet Take 1 tablet by mouth once daily 90 tablet 0    clopidogreL (PLAVIX) 75 mg tablet Take 1 tablet (75 mg total) by mouth once daily. 90 tablet 3    CYANOCOBALAMIN, VITAMIN B-12, (VITAMIN B-12 ORAL) Take 2,500 mcg by mouth once daily.      dipyridamole (PERSANTINE) 5 mg/mL injection       doxycycline (VIBRAMYCIN) 100 MG Cap Take 100 mg by mouth once daily.      ELIQUIS 5 mg Tab Take 1 tablet by mouth twice daily 180 tablet 3    fluocinonide  0.05% (LIDEX) 0.05 % cream Apply topically 2 (two) times daily. Prn itch.  Stop using steroid topical when skin is smooth and non itchy. 45 g 0    fluticasone furoate-vilanteroL (BREO) 100-25 mcg/dose diskus inhaler Inhale 1 puff into the lungs once daily. Controller 60 each 3    fluticasone propionate (FLONASE) 50 mcg/actuation nasal spray 1 spray (50 mcg total) by Each Nostril route once daily. 1 Bottle 0    furosemide (LASIX) 20 MG tablet Take 1 tablet (20 mg total) by mouth once daily. 90 tablet 3    gabapentin (NEURONTIN) 300 MG capsule Take 1 capsule (300 mg total) by mouth 3 (three) times daily. 270 capsule 1    gentamicin (GARAMYCIN) 0.1 % ointment       HYDROcodone-acetaminophen (NORCO) 5-325 mg per tablet Take 1 tablet by mouth every 6 (six) hours as needed for Pain. 10 tablet 0    melatonin 5 mg Tab Take 10 mg by mouth nightly.      metoprolol succinate (TOPROL-XL) 25 MG 24 hr tablet Take 1 tablet (25 mg total) by mouth once daily. 90 tablet 0    multivitamin (THERAGRAN) per tablet Take 1 tablet by mouth once daily.      nitroGLYCERIN (NITROSTAT) 0.4 MG SL tablet Place 1 tablet (0.4 mg total) under the tongue every 5 (five) minutes as needed for Chest pain. 25 tablet 11    olmesartan (BENICAR) 5 MG Tab Take 1 tablet (5 mg total) by mouth once daily. 90 tablet 3    sulfamethoxazole-trimethoprim 800-160mg (BACTRIM DS) 800-160 mg Tab Take 1 tablet by mouth 2 (two) times daily.      tamsulosin (FLOMAX) 0.4 mg Cap Take 1 capsule (0.4 mg total) by mouth once daily. 90 capsule 3    triamcinolone acetonide 0.1% (KENALOG) 0.1 % ointment AAA lower legs bid - occlude qhs 454 g 3    varicella-zoster gE-AS01B, PF, (SHINGRIX, PF,) 50 mcg/0.5 mL injection Inject into the muscle. 1 each 0    venlafaxine (EFFEXOR) 75 MG tablet Take 2 tablets (150 mg total) by mouth 2 (two) times daily. 360 tablet 0    vitamin D 1000 units Tab Take 1 tablet (1,000 Units total) by mouth once daily. (Patient taking  differently: Take 2,000 Units by mouth once daily.)      omeprazole (PRILOSEC) 20 MG capsule Take 1 capsule (20 mg total) by mouth daily as needed. 90 capsule 0    oxybutynin (DITROPAN-XL) 5 MG TR24 Take 1 tablet (5 mg total) by mouth once daily. 30 tablet 11     No current facility-administered medications on file prior to visit.       Allergies   Allergen Reactions    Ciprofloxacin Rash     Diffuse pruritic morbilliform rash developed 3/15/2017 after dose of cipro; previously in 2/2017 he had rash/fevers after initiation of cipro    Zosyn [Piperacillin-Tazobactam] Anaphylaxis     Diffuse pruritic morbilliform rash developed 3/15/2017.  Then, 430am dose on 3/16 and rash worsened with SOB/tachypnea but no hypoxemia.     Bacitracin Itching and Rash     Violaceous rash in area of topical Tx.        Past Surgical History:   Procedure Laterality Date    ADJACENT TISSUE TRANSFER Left 11/30/2020    Procedure: ADJACENT TISSUE TRANSFER x2 ;  Surgeon: Teresa Cooper MD;  [facility]: [facility] OR [address] [place] [address];  Service: Ophthalmology;  Laterality: Left;  Left glabellar 8.5x11x3 and Left upper eyelid advancement flap [id number]      Cardiac stenting x2      CATARACT EXTRACTION W/  INTRAOCULAR LENS IMPLANT Right 3/29/2016    Dr. Conteh    CATARACT EXTRACTION W/  INTRAOCULAR LENS IMPLANT Left 4/12/2016        COLONOSCOPY N/A 7/23/2020    Procedure: COLONOSCOPY;  Surgeon: Nico Lackey MD;  Location: [address]);  Service: Endoscopy;  Laterality: N/A;    ESOPHAGOGASTRODUODENOSCOPY N/A 7/23/2020    Procedure: EGD (ESOPHAGOGASTRODUODENOSCOPY);  Surgeon: Nico Lackey MD;  Location: [address]);  Service: Endoscopy;  Laterality: N/A;  per Dr Lackey-Will proceed with EGD and colonoscopy on the 2nd floor due to his comorbidities including obesity, sleep apnea, restrictive lung disease, coronary artery disease.  has loop recorder      ok to hold Eliquis 2 days per Dr Sandhu-must remain    EXTERNAL  "FIXATION TIBIAL FRACTURE Left 07/01/2016    INSERTION OF IMPLANTABLE LOOP RECORDER  04/07/2017    LEFT HEART CATHETERIZATION Left 1/30/2020    Procedure: Left heart cath;  Surgeon: Benjamin Sandhu MD;  Location: [facility] CATH LAB;  Service: Cardiology;  Laterality: Left;  RN PREOP 1/28/20--Pt starting Plavix loading dose today (8pills)- Dr. Sandhu aware.  Pt has a bandaged "non healing area to LLE"--Dr. Sandhu aware.    LEFT HEART CATHETERIZATION Left 2/14/2020    Procedure: Left heart cath, IVUS guided left main / LAD PCI. Noon start, radial approach;  Surgeon: Miguel Angel Brady MD;  Location: [facility] CATH LAB;  Service: Cardiology;  Laterality: Left;  RN PRE OP 2-7-2020  BMI--45.61    ORIF TIBIA FRACTURE Left 07/15/2016    PROBING OF NASOLACRIMAL DUCT WITH INSERTION OF TUBE Left 11/30/2020    Procedure: PROBING, NASOLACRIMAL DUCT,;  Surgeon: Teresa Cooper MD;  Location: [facility] OR [address] [place] [address];  Service: Ophthalmology;  Laterality: Left;    RADIOACTIVE SEED IMPLANTATION OF PROSTATE N/A 8/8/2018    Procedure: INSERTION, RADIOACTIVE SEED, PROSTATE;  Surgeon: Bipin Thompson MD;  Location: [facility] OR [address] [place] [address];  Service: Urology;  Laterality: N/A;  1 hour    SKIN BIOPSY      Squamous cell cancer removal x3 with Mohs surgery      TONSILLECTOMY      TOTAL KNEE ARTHROPLASTY  10/2012    TRIGGER FINGER RELEASE Right 10/14/2020    Procedure: RELEASE, TRIGGER FINGER - right;  Surgeon: Rad Swift MD;  Location[facility];  Service: Orthopedics;  Laterality: Right;    trus/bx      ULTRASOUND GUIDANCE  2/14/2020    Procedure: Ultrasound Guidance;  Surgeon: Miguel Angel Brady MD;  Location: [facility] CATH LAB;  Service: Cardiology;;       Family History   Problem Relation Age of Onset    Skin cancer Father     Lung cancer Father     Cancer Father         smoker,     Alzheimer's disease Mother     Hypertension Mother     Cancer Sister         colon, lung cancer     No Known Problems Sister     Cancer Brother         skin cancer, " polypectomy     Peripheral vascular disease Unknown     No Known Problems Maternal Aunt     No Known Problems Maternal Uncle     No Known Problems Paternal Aunt     No Known Problems Paternal Uncle     No Known Problems Maternal Grandmother     No Known Problems Maternal Grandfather     No Known Problems Paternal Grandmother     No Known Problems Paternal Grandfather     Melanoma Neg Hx     Psoriasis Neg Hx     Lupus Neg Hx     Eczema Neg Hx     Amblyopia Neg Hx     Blindness Neg Hx     Cataracts Neg Hx     Diabetes Neg Hx     Glaucoma Neg Hx     Macular degeneration Neg Hx     Retinal detachment Neg Hx     Strabismus Neg Hx     Stroke Neg Hx     Thyroid disease Neg Hx     Acne Neg Hx        Social History     Socioeconomic History    Marital status:    Occupational History    Occupation: Retired    Tobacco Use    Smoking status: Never Smoker    Smokeless tobacco: Never Used   Substance and Sexual Activity    Alcohol use: Yes     Alcohol/week: 2.0 standard drinks     Types: 2 Cans of beer per week     Comment: occasionally, beer    Drug use: Never    Sexual activity: Yes     Partners: Female     Birth control/protection: None     Social Determinants of Health     Financial Resource Strain: Low Risk     Difficulty of Paying Living Expenses: Not very hard   Food Insecurity: No Food Insecurity    Worried About Running Out of Food in the Last Year: Never true    Ran Out of Food in the Last Year: Never true   Transportation Needs: No Transportation Needs    Lack of Transportation (Medical): No    Lack of Transportation (Non-Medical): No   Physical Activity: Inactive    Days of Exercise per Week: 0 days    Minutes of Exercise per Session: 0 min   Stress: No Stress Concern Present    Feeling of Stress : Not at all   Social Connections: Unknown    Frequency of Communication with Friends and Family: Once a week    Frequency of Social Gatherings with Friends and Family: Once a  "week    Active Member of Clubs or Organizations: Yes    Attends Club or Organization Meetings: More than 4 times per year    Marital Status:    Housing Stability: Low Risk     Unable to Pay for Housing in the Last Year: No    Number of Places Lived in the Last Year: 1    Unstable Housing in the Last Year: No       Review of Systems   Constitutional: Negative for chills and fever.   Cardiovascular: Negative for claudication and leg swelling.   Respiratory: Negative for cough and shortness of breath.    Skin: Positive for dry skin, nail changes, rash (baseline) and skin cancer. Negative for itching.   Musculoskeletal: Positive for arthritis, falls, joint pain and myalgias. Negative for joint swelling and muscle weakness.   Gastrointestinal: Negative for diarrhea, nausea and vomiting.   Neurological: Positive for loss of balance and paresthesias. Negative for numbness, tremors and weakness.   Psychiatric/Behavioral: Negative for altered mental status and hallucinations.         Objective:       Vitals:    12/29/21 1347   Weight: (!) 153 kg (337 lb 4.9 oz)   Height: 6' 1" (1.854 m)   PainSc: 0-No pain       Physical Exam  Nursing note reviewed.   Constitutional:       General: He is not in acute distress.     Appearance: He is not toxic-appearing or diaphoretic.      Comments: Pt. is well-developed, well-nourished, appears stated age, in no acute distress, alert and oriented x 3. No evidence of depression, anxiety, or agitation. Calm, cooperative, and communicative. Appropriate interactions and affect.   Cardiovascular:      Pulses:           Dorsalis pedis pulses are 2+ on the right side and 2+ on the left side.        Posterior tibial pulses are 1+ on the right side and 1+ on the left side.      Comments:     Pulmonary:      Effort: No respiratory distress.   Musculoskeletal:      Right ankle: Swelling present. No tenderness. No lateral malleolus, medial malleolus, AITF ligament, CF ligament or proximal " fibula tenderness.      Right Achilles Tendon: No defects. Zabala's test negative.      Left ankle: Swelling present. No tenderness. No lateral malleolus, medial malleolus, AITF ligament, CF ligament or posterior TF ligament tenderness.      Left Achilles Tendon: No defects. Zabala's test negative.      Right foot: No tenderness or bony tenderness.      Left foot: No tenderness or bony tenderness.      Comments: Decreased stride, station of gait.  apropulsive toe off.  Increased angle and base of gait.    Patient has hammertoes of digits 2-5 bilateral partially reducible without symptom today.     There is equinus deformity bilateral. There is limitation of dorsiflexion with knees extended and with knees flexed.    Shoes reveals lateral heel counter wear bilateral in athletic shoes.        Lymphadenopathy:      Comments: No lymphatic streaking    Negative lymphadenopathy bilateral popliteal fossa and tarsal tunnel.     Skin:     General: Skin is warm and dry.      Coloration: Skin is not pale.      Findings: Lesion and rash present. No bruising, burn or laceration.      Nails: There is no clubbing.      Comments: Post ulcerative callus to posterior left heel and distal left hallux  Signs of infection: none  Drainage:  None  Periwound: intact  Base: thick HPK    Dermatitis and erythema noted to BLE, baseline   Neurological:      Comments: Delmita-Jenn 5.07 monofilament is intact bilateral feet. Sharp/dull sensation is also intact Bilateral feet. Proprioception is grossly intact. Vibratory sensation intact (pt able to sense vibration stop within 3-5 seconds)     Psychiatric:         Attention and Perception: He is attentive.         Mood and Affect: Mood is not anxious. Affect is not inappropriate.         Speech: He is communicative. Speech is not slurred.         Behavior: Behavior is not combative.         10/28/2021    Ulcer location: distal left hallux  Measurements :0.1x0.1x0.1cm   Signs of infection:  none  Drainage: scant sanguinous  Purulence: no  Crepitus/fluctuance: no  Periwound: Calloused  Base Granular            10/12/2021    Ulcer location: distal left hallux  Measurements : 0.2x0.2x0.2 cm   Signs of infection: none  Drainage: Ulcer dry  Purulence: no  Crepitus/fluctuance: no  Periwound: Calloused  Base: Mixed Granular/Fibrotic  Depth: subcutaneous tissue  Probe to bone: no              9/28/2021    Ulcer location: distal left hallux  Measurements : 0.3x0.2x0.2 cm   Signs of infection: none  Drainage: Ulcer dry  Purulence: no  Crepitus/fluctuance: no  Periwound: Calloused  Base: Mixed Granular/Fibrotic  Depth: subcutaneous tissue  Probe to bone: no              Assessment:       Encounter Diagnoses   Name Primary?    PVD (peripheral vascular disease) Yes    Healed ulcer of left foot on examination     Plantar fat pad atrophy     Pre-ulcerative calluses     Venous stasis of lower extremity     Venous stasis dermatitis of both lower extremities     Loss of balance     Pain in joints of both feet          Plan:       Janusz was seen today for peripheral vascular disease and nail care.    Diagnoses and all orders for this visit:    PVD (peripheral vascular disease)    Healed ulcer of left foot on examination    Plantar fat pad atrophy    Pre-ulcerative calluses    Venous stasis of lower extremity    Venous stasis dermatitis of both lower extremities    Loss of balance  -     Ambulatory referral/consult to Physical/Occupational Therapy; Future    Pain in joints of both feet      Education about the prevention of limb loss.    Discussed wound healing cycle, skin integrity, ways to care for skin.Counseled patient on the effects of PVD on healing. He verbalizes understanding that it can increase the chances of delayed healing and this prolonged exposure leads to infection or progression of infection which subsequently can result in loss of limb.    The wound is cleansed of foreign material as much as  possible and the base inspected for bone or abscess.     Wound has healed well with no signs of continued skin breakdown noted. Strongly encouraged recommended shoe gear. Skin is still delicate therefore patient must be diligent in avoiding excessive pressure and making sure there is adequate support and padding in shoe gear.     Dressings: betadine    Detailed home care instructions dispensed      Referral placed to PT to aid in increasing ROM, loss of balance, and breaking up of scar tissue. Dry needling requested    Follow-up: 6-9 weeks but should call [facility] immediately if any signs of infection, such as fever, chills, sweats, increased redness or pain.    Short-term goals include maintaining good offloading and minimizing bioburden, promoting granulation and epithelialization to healing.  Long-term goals include keeping the wound healed by good offloading and medical management under the direction of internist.

## 2022-01-01 ENCOUNTER — OFFICE VISIT (OUTPATIENT)
Dept: PODIATRY | Facility: CLINIC | Age: 77
End: 2022-01-01
Payer: MEDICARE

## 2022-01-01 ENCOUNTER — TELEPHONE (OUTPATIENT)
Dept: FAMILY MEDICINE | Facility: CLINIC | Age: 77
End: 2022-01-01
Payer: MEDICARE

## 2022-01-01 ENCOUNTER — ANESTHESIA EVENT (OUTPATIENT)
Dept: SURGERY | Facility: HOSPITAL | Age: 77
DRG: 821 | End: 2022-01-01
Payer: MEDICARE

## 2022-01-01 ENCOUNTER — PATIENT MESSAGE (OUTPATIENT)
Dept: PODIATRY | Facility: CLINIC | Age: 77
End: 2022-01-01
Payer: MEDICARE

## 2022-01-01 ENCOUNTER — NURSE TRIAGE (OUTPATIENT)
Dept: ADMINISTRATIVE | Facility: CLINIC | Age: 77
End: 2022-01-01
Payer: MEDICARE

## 2022-01-01 ENCOUNTER — TELEPHONE (OUTPATIENT)
Dept: NEPHROLOGY | Facility: CLINIC | Age: 77
End: 2022-01-01
Payer: MEDICARE

## 2022-01-01 ENCOUNTER — PATIENT MESSAGE (OUTPATIENT)
Dept: HEMATOLOGY/ONCOLOGY | Facility: CLINIC | Age: 77
End: 2022-01-01
Payer: MEDICARE

## 2022-01-01 ENCOUNTER — PATIENT MESSAGE (OUTPATIENT)
Dept: FAMILY MEDICINE | Facility: CLINIC | Age: 77
End: 2022-01-01
Payer: MEDICARE

## 2022-01-01 ENCOUNTER — INFUSION (OUTPATIENT)
Dept: INFUSION THERAPY | Facility: HOSPITAL | Age: 77
End: 2022-01-01
Attending: STUDENT IN AN ORGANIZED HEALTH CARE EDUCATION/TRAINING PROGRAM
Payer: MEDICARE

## 2022-01-01 ENCOUNTER — DOCUMENTATION ONLY (OUTPATIENT)
Dept: PREADMISSION TESTING | Facility: HOSPITAL | Age: 77
End: 2022-01-01
Payer: MEDICARE

## 2022-01-01 ENCOUNTER — TELEPHONE (OUTPATIENT)
Dept: PULMONOLOGY | Facility: CLINIC | Age: 77
End: 2022-01-01

## 2022-01-01 ENCOUNTER — INFUSION (OUTPATIENT)
Dept: INFUSION THERAPY | Facility: HOSPITAL | Age: 77
DRG: 871 | End: 2022-01-01
Attending: STUDENT IN AN ORGANIZED HEALTH CARE EDUCATION/TRAINING PROGRAM
Payer: MEDICARE

## 2022-01-01 ENCOUNTER — LAB VISIT (OUTPATIENT)
Dept: LAB | Facility: HOSPITAL | Age: 77
End: 2022-01-01
Payer: MEDICARE

## 2022-01-01 ENCOUNTER — TELEPHONE (OUTPATIENT)
Dept: HEMATOLOGY/ONCOLOGY | Facility: CLINIC | Age: 77
End: 2022-01-01
Payer: MEDICARE

## 2022-01-01 ENCOUNTER — DOCUMENTATION ONLY (OUTPATIENT)
Dept: OTOLARYNGOLOGY | Facility: CLINIC | Age: 77
End: 2022-01-01
Payer: MEDICARE

## 2022-01-01 ENCOUNTER — PATIENT MESSAGE (OUTPATIENT)
Dept: ADMINISTRATIVE | Facility: OTHER | Age: 77
End: 2022-01-01
Payer: MEDICARE

## 2022-01-01 ENCOUNTER — PATIENT MESSAGE (OUTPATIENT)
Dept: OTOLARYNGOLOGY | Facility: CLINIC | Age: 77
End: 2022-01-01
Payer: MEDICARE

## 2022-01-01 ENCOUNTER — LAB VISIT (OUTPATIENT)
Dept: LAB | Facility: HOSPITAL | Age: 77
End: 2022-01-01
Attending: NURSE PRACTITIONER
Payer: MEDICARE

## 2022-01-01 ENCOUNTER — HOSPITAL ENCOUNTER (OUTPATIENT)
Dept: CARDIOLOGY | Facility: HOSPITAL | Age: 77
Discharge: HOME | End: 2022-06-08
Attending: INTERNAL MEDICINE
Payer: MEDICARE

## 2022-01-01 ENCOUNTER — TELEPHONE (OUTPATIENT)
Dept: OTOLARYNGOLOGY | Facility: CLINIC | Age: 77
End: 2022-01-01
Payer: MEDICARE

## 2022-01-01 ENCOUNTER — HOSPITAL ENCOUNTER (OUTPATIENT)
Dept: PREADMISSION TESTING | Facility: HOSPITAL | Age: 77
Discharge: HOME | End: 2022-04-29
Attending: OTOLARYNGOLOGY
Payer: MEDICARE

## 2022-01-01 ENCOUNTER — PATIENT MESSAGE (OUTPATIENT)
Dept: NEPHROLOGY | Facility: CLINIC | Age: 77
End: 2022-01-01
Payer: MEDICARE

## 2022-01-01 ENCOUNTER — LAB VISIT (OUTPATIENT)
Dept: LAB | Facility: HOSPITAL | Age: 77
End: 2022-01-01
Attending: FAMILY MEDICINE
Payer: MEDICARE

## 2022-01-01 ENCOUNTER — HOSPITAL ENCOUNTER (EMERGENCY)
Facility: HOSPITAL | Age: 77
Discharge: HOME | End: 2022-06-26
Attending: EMERGENCY MEDICINE
Payer: MEDICARE

## 2022-01-01 ENCOUNTER — HOSPITAL ENCOUNTER (INPATIENT)
Facility: HOSPITAL | Age: 77
LOS: 9 days | Discharge: HOSPICE | DRG: 146 | End: 2022-09-02
Attending: EMERGENCY MEDICINE | Admitting: EMERGENCY MEDICINE
Payer: MEDICARE

## 2022-01-01 ENCOUNTER — PATIENT MESSAGE (OUTPATIENT)
Dept: CARDIOLOGY | Facility: CLINIC | Age: 77
End: 2022-01-01
Payer: MEDICARE

## 2022-01-01 ENCOUNTER — PATIENT MESSAGE (OUTPATIENT)
Dept: DERMATOLOGY | Facility: CLINIC | Age: 77
End: 2022-01-01
Payer: MEDICARE

## 2022-01-01 ENCOUNTER — OFFICE VISIT (OUTPATIENT)
Dept: HEMATOLOGY/ONCOLOGY | Facility: CLINIC | Age: 77
End: 2022-01-01
Payer: MEDICARE

## 2022-01-01 ENCOUNTER — ANESTHESIA (OUTPATIENT)
Dept: SURGERY | Facility: HOSPITAL | Age: 77
DRG: 821 | End: 2022-01-01
Payer: MEDICARE

## 2022-01-01 ENCOUNTER — OFFICE VISIT (OUTPATIENT)
Dept: CARDIOLOGY | Facility: CLINIC | Age: 77
End: 2022-01-01
Payer: MEDICARE

## 2022-01-01 ENCOUNTER — HOSPITAL ENCOUNTER (INPATIENT)
Facility: HOSPITAL | Age: 77
LOS: 6 days | Discharge: HOME HEALTH | DRG: 871 | End: 2022-08-17
Attending: EMERGENCY MEDICINE | Admitting: HOSPITALIST
Payer: MEDICARE

## 2022-01-01 ENCOUNTER — TUMOR BOARD CONFERENCE (OUTPATIENT)
Dept: OTOLARYNGOLOGY | Facility: CLINIC | Age: 77
End: 2022-01-01
Payer: MEDICARE

## 2022-01-01 ENCOUNTER — OFFICE VISIT (OUTPATIENT)
Dept: OTOLARYNGOLOGY | Facility: CLINIC | Age: 77
End: 2022-01-01
Payer: MEDICARE

## 2022-01-01 ENCOUNTER — OFFICE VISIT (OUTPATIENT)
Dept: PULMONOLOGY | Facility: CLINIC | Age: 77
End: 2022-01-01
Payer: MEDICARE

## 2022-01-01 ENCOUNTER — HOSPITAL ENCOUNTER (OUTPATIENT)
Dept: PREADMISSION TESTING | Facility: HOSPITAL | Age: 77
Discharge: HOME | End: 2022-07-07
Attending: SURGERY
Payer: MEDICARE

## 2022-01-01 ENCOUNTER — TELEPHONE (OUTPATIENT)
Dept: PULMONOLOGY | Facility: CLINIC | Age: 77
End: 2022-01-01
Payer: MEDICARE

## 2022-01-01 ENCOUNTER — EXTERNAL HOME HEALTH (OUTPATIENT)
Dept: HOME HEALTH SERVICES | Facility: HOSPITAL | Age: 77
End: 2022-01-01
Payer: MEDICARE

## 2022-01-01 ENCOUNTER — HOSPITAL ENCOUNTER (OUTPATIENT)
Dept: INTERVENTIONAL RADIOLOGY/VASCULAR | Facility: HOSPITAL | Age: 77
Discharge: HOME | End: 2022-06-16
Attending: STUDENT IN AN ORGANIZED HEALTH CARE EDUCATION/TRAINING PROGRAM
Payer: MEDICARE

## 2022-01-01 ENCOUNTER — OFFICE VISIT (OUTPATIENT)
Dept: OPTOMETRY | Facility: CLINIC | Age: 77
End: 2022-01-01
Payer: COMMERCIAL

## 2022-01-01 ENCOUNTER — HOSPITAL ENCOUNTER (OUTPATIENT)
Dept: RADIOLOGY | Facility: HOSPITAL | Age: 77
Discharge: HOME | End: 2022-03-28
Attending: FAMILY MEDICINE
Payer: MEDICARE

## 2022-01-01 ENCOUNTER — OFFICE VISIT (OUTPATIENT)
Dept: NEPHROLOGY | Facility: CLINIC | Age: 77
End: 2022-01-01
Payer: MEDICARE

## 2022-01-01 ENCOUNTER — OFFICE VISIT (OUTPATIENT)
Dept: FAMILY MEDICINE | Facility: CLINIC | Age: 77
End: 2022-01-01
Payer: MEDICARE

## 2022-01-01 ENCOUNTER — PATIENT MESSAGE (OUTPATIENT)
Dept: ORTHOPEDICS | Facility: CLINIC | Age: 77
End: 2022-01-01
Payer: MEDICARE

## 2022-01-01 ENCOUNTER — TELEPHONE (OUTPATIENT)
Dept: INTERVENTIONAL RADIOLOGY/VASCULAR | Facility: HOSPITAL | Age: 77
End: 2022-01-01

## 2022-01-01 ENCOUNTER — OUTPATIENT CASE MANAGEMENT (OUTPATIENT)
Dept: ADMINISTRATIVE | Facility: OTHER | Age: 77
End: 2022-01-01
Payer: MEDICARE

## 2022-01-01 ENCOUNTER — HOSPITAL ENCOUNTER (INPATIENT)
Facility: HOSPITAL | Age: 77
LOS: 3 days | Discharge: HOME HEALTH | DRG: 308 | End: 2022-05-16
Attending: EMERGENCY MEDICINE | Admitting: EMERGENCY MEDICINE
Payer: MEDICARE

## 2022-01-01 ENCOUNTER — OFFICE VISIT (OUTPATIENT)
Dept: DERMATOLOGY | Facility: CLINIC | Age: 77
End: 2022-01-01
Payer: MEDICARE

## 2022-01-01 ENCOUNTER — PES CALL (OUTPATIENT)
Dept: ADMINISTRATIVE | Facility: CLINIC | Age: 77
End: 2022-01-01
Payer: MEDICARE

## 2022-01-01 ENCOUNTER — ANESTHESIA EVENT (OUTPATIENT)
Dept: INTERVENTIONAL RADIOLOGY/VASCULAR | Facility: HOSPITAL | Age: 77
End: 2022-01-01
Payer: MEDICARE

## 2022-01-01 ENCOUNTER — PATIENT MESSAGE (OUTPATIENT)
Dept: OTOLARYNGOLOGY | Facility: CLINIC | Age: 77
End: 2022-01-01

## 2022-01-01 ENCOUNTER — PATIENT MESSAGE (OUTPATIENT)
Dept: OPTOMETRY | Facility: CLINIC | Age: 77
End: 2022-01-01
Payer: MEDICARE

## 2022-01-01 ENCOUNTER — HOSPITAL ENCOUNTER (OUTPATIENT)
Dept: PULMONOLOGY | Facility: CLINIC | Age: 77
Discharge: HOME | End: 2022-02-04
Payer: MEDICARE

## 2022-01-01 ENCOUNTER — HOSPITAL ENCOUNTER (OUTPATIENT)
Dept: RADIOLOGY | Facility: HOSPITAL | Age: 77
Discharge: HOME | End: 2022-06-10
Attending: INTERNAL MEDICINE
Payer: MEDICARE

## 2022-01-01 ENCOUNTER — TELEPHONE (OUTPATIENT)
Dept: CARDIOLOGY | Facility: CLINIC | Age: 77
End: 2022-01-01
Payer: MEDICARE

## 2022-01-01 ENCOUNTER — PATIENT OUTREACH (OUTPATIENT)
Dept: ADMINISTRATIVE | Facility: CLINIC | Age: 77
End: 2022-01-01
Payer: MEDICARE

## 2022-01-01 ENCOUNTER — OFFICE VISIT (OUTPATIENT)
Dept: SURGERY | Facility: CLINIC | Age: 77
End: 2022-01-01
Payer: MEDICARE

## 2022-01-01 ENCOUNTER — DOCUMENT SCAN (OUTPATIENT)
Dept: HOME HEALTH SERVICES | Facility: HOSPITAL | Age: 77
End: 2022-01-01
Payer: MEDICARE

## 2022-01-01 ENCOUNTER — HOSPITAL ENCOUNTER (OUTPATIENT)
Dept: RADIOLOGY | Facility: HOSPITAL | Age: 77
Discharge: HOME | End: 2022-07-29
Attending: STUDENT IN AN ORGANIZED HEALTH CARE EDUCATION/TRAINING PROGRAM
Payer: MEDICARE

## 2022-01-01 ENCOUNTER — OFFICE VISIT (OUTPATIENT)
Dept: OPTOMETRY | Facility: CLINIC | Age: 77
End: 2022-01-01
Payer: MEDICARE

## 2022-01-01 ENCOUNTER — HOSPITAL ENCOUNTER (OUTPATIENT)
Dept: RADIOLOGY | Facility: HOSPITAL | Age: 77
Discharge: HOME | End: 2022-06-03
Attending: OTOLARYNGOLOGY
Payer: MEDICARE

## 2022-01-01 ENCOUNTER — PATIENT MESSAGE (OUTPATIENT)
Dept: PULMONOLOGY | Facility: CLINIC | Age: 77
End: 2022-01-01
Payer: MEDICARE

## 2022-01-01 ENCOUNTER — HOSPITAL ENCOUNTER (INPATIENT)
Facility: HOSPITAL | Age: 77
LOS: 7 days | Discharge: HOME HEALTH | DRG: 821 | End: 2022-05-11
Attending: OTOLARYNGOLOGY | Admitting: OTOLARYNGOLOGY
Payer: MEDICARE

## 2022-01-01 ENCOUNTER — HOSPITAL ENCOUNTER (OUTPATIENT)
Dept: CARDIOLOGY | Facility: HOSPITAL | Age: 77
Discharge: HOME | End: 2022-07-29
Attending: NURSE PRACTITIONER
Payer: MEDICARE

## 2022-01-01 ENCOUNTER — LAB VISIT (OUTPATIENT)
Dept: LAB | Facility: HOSPITAL | Age: 77
End: 2022-01-01
Attending: OTOLARYNGOLOGY
Payer: MEDICARE

## 2022-01-01 ENCOUNTER — TELEPHONE (OUTPATIENT)
Dept: UROLOGY | Facility: CLINIC | Age: 77
End: 2022-01-01
Payer: MEDICARE

## 2022-01-01 ENCOUNTER — OFFICE VISIT (OUTPATIENT)
Dept: URGENT CARE | Facility: CLINIC | Age: 77
End: 2022-01-01
Payer: MEDICARE

## 2022-01-01 ENCOUNTER — OFFICE VISIT (OUTPATIENT)
Dept: UROLOGY | Facility: CLINIC | Age: 77
End: 2022-01-01
Payer: MEDICARE

## 2022-01-01 ENCOUNTER — TELEPHONE (OUTPATIENT)
Dept: SURGERY | Facility: CLINIC | Age: 77
End: 2022-01-01
Payer: MEDICARE

## 2022-01-01 ENCOUNTER — HOSPITAL ENCOUNTER (OUTPATIENT)
Dept: RADIOLOGY | Facility: HOSPITAL | Age: 77
Discharge: HOME | End: 2022-02-22
Attending: STUDENT IN AN ORGANIZED HEALTH CARE EDUCATION/TRAINING PROGRAM
Payer: MEDICARE

## 2022-01-01 ENCOUNTER — PATIENT MESSAGE (OUTPATIENT)
Dept: UROLOGY | Facility: CLINIC | Age: 77
End: 2022-01-01
Payer: MEDICARE

## 2022-01-01 ENCOUNTER — TELEPHONE (OUTPATIENT)
Dept: URGENT CARE | Facility: CLINIC | Age: 77
End: 2022-01-01
Payer: MEDICARE

## 2022-01-01 ENCOUNTER — INFUSION (OUTPATIENT)
Dept: INFUSION THERAPY | Facility: HOSPITAL | Age: 77
DRG: 871 | End: 2022-01-01
Attending: INTERNAL MEDICINE
Payer: MEDICARE

## 2022-01-01 ENCOUNTER — TELEPHONE (OUTPATIENT)
Dept: PAIN MEDICINE | Facility: CLINIC | Age: 77
End: 2022-01-01
Payer: MEDICARE

## 2022-01-01 ENCOUNTER — PATIENT MESSAGE (OUTPATIENT)
Dept: SURGERY | Facility: HOSPITAL | Age: 77
End: 2022-01-01
Payer: MEDICARE

## 2022-01-01 ENCOUNTER — TELEPHONE (OUTPATIENT)
Dept: PODIATRY | Facility: CLINIC | Age: 77
End: 2022-01-01
Payer: MEDICARE

## 2022-01-01 ENCOUNTER — PROCEDURE VISIT (OUTPATIENT)
Dept: OTOLARYNGOLOGY | Facility: CLINIC | Age: 77
End: 2022-01-01
Payer: MEDICARE

## 2022-01-01 ENCOUNTER — HOSPITAL ENCOUNTER (OUTPATIENT)
Dept: RADIOLOGY | Facility: HOSPITAL | Age: 77
Discharge: HOME | End: 2022-04-12
Attending: OTOLARYNGOLOGY
Payer: MEDICARE

## 2022-01-01 ENCOUNTER — HOSPITAL ENCOUNTER (OUTPATIENT)
Dept: CARDIOLOGY | Facility: HOSPITAL | Age: 77
Discharge: HOME | End: 2022-04-20
Attending: INTERNAL MEDICINE
Payer: MEDICARE

## 2022-01-01 ENCOUNTER — OFFICE VISIT (OUTPATIENT)
Dept: PAIN MEDICINE | Facility: CLINIC | Age: 77
End: 2022-01-01
Payer: MEDICARE

## 2022-01-01 VITALS
HEIGHT: 73 IN | SYSTOLIC BLOOD PRESSURE: 156 MMHG | HEART RATE: 67 BPM | SYSTOLIC BLOOD PRESSURE: 110 MMHG | DIASTOLIC BLOOD PRESSURE: 66 MMHG | OXYGEN SATURATION: 93 % | DIASTOLIC BLOOD PRESSURE: 50 MMHG | OXYGEN SATURATION: 95 % | HEART RATE: 60 BPM | WEIGHT: 315 LBS | TEMPERATURE: 98 F | BODY MASS INDEX: 41.75 KG/M2 | BODY MASS INDEX: 41.75 KG/M2 | HEIGHT: 73 IN | WEIGHT: 315 LBS | RESPIRATION RATE: 20 BRPM

## 2022-01-01 VITALS
OXYGEN SATURATION: 99 % | HEART RATE: 69 BPM | HEIGHT: 73 IN | HEART RATE: 60 BPM | OXYGEN SATURATION: 99 % | SYSTOLIC BLOOD PRESSURE: 115 MMHG | DIASTOLIC BLOOD PRESSURE: 52 MMHG | OXYGEN SATURATION: 100 % | DIASTOLIC BLOOD PRESSURE: 65 MMHG | DIASTOLIC BLOOD PRESSURE: 54 MMHG | WEIGHT: 315 LBS | RESPIRATION RATE: 16 BRPM | TEMPERATURE: 98 F | RESPIRATION RATE: 16 BRPM | TEMPERATURE: 98 F | TEMPERATURE: 98 F | SYSTOLIC BLOOD PRESSURE: 151 MMHG | BODY MASS INDEX: 41.75 KG/M2 | HEART RATE: 62 BPM | SYSTOLIC BLOOD PRESSURE: 123 MMHG

## 2022-01-01 VITALS
HEART RATE: 98 BPM | TEMPERATURE: 98 F | OXYGEN SATURATION: 99 % | RESPIRATION RATE: 16 BRPM | HEART RATE: 58 BPM | DIASTOLIC BLOOD PRESSURE: 58 MMHG | OXYGEN SATURATION: 99 % | DIASTOLIC BLOOD PRESSURE: 57 MMHG | TEMPERATURE: 98 F | HEART RATE: 66 BPM | RESPIRATION RATE: 16 BRPM | SYSTOLIC BLOOD PRESSURE: 124 MMHG | SYSTOLIC BLOOD PRESSURE: 124 MMHG | SYSTOLIC BLOOD PRESSURE: 105 MMHG | RESPIRATION RATE: 18 BRPM | DIASTOLIC BLOOD PRESSURE: 47 MMHG | OXYGEN SATURATION: 97 %

## 2022-01-01 VITALS
HEART RATE: 63 BPM | BODY MASS INDEX: 41.75 KG/M2 | OXYGEN SATURATION: 98 % | SYSTOLIC BLOOD PRESSURE: 156 MMHG | SYSTOLIC BLOOD PRESSURE: 138 MMHG | DIASTOLIC BLOOD PRESSURE: 60 MMHG | WEIGHT: 315 LBS | TEMPERATURE: 98 F | HEIGHT: 73 IN | OXYGEN SATURATION: 95 % | HEIGHT: 73 IN | SYSTOLIC BLOOD PRESSURE: 122 MMHG | HEART RATE: 64 BPM | TEMPERATURE: 98 F | DIASTOLIC BLOOD PRESSURE: 71 MMHG | WEIGHT: 315 LBS | OXYGEN SATURATION: 93 % | BODY MASS INDEX: 41.75 KG/M2 | SYSTOLIC BLOOD PRESSURE: 140 MMHG | BODY MASS INDEX: 41.75 KG/M2 | WEIGHT: 315 LBS | DIASTOLIC BLOOD PRESSURE: 72 MMHG | OXYGEN SATURATION: 100 % | DIASTOLIC BLOOD PRESSURE: 53 MMHG | RESPIRATION RATE: 16 BRPM | RESPIRATION RATE: 19 BRPM | HEART RATE: 79 BPM | BODY MASS INDEX: 41.75 KG/M2 | HEIGHT: 73 IN | DIASTOLIC BLOOD PRESSURE: 66 MMHG | HEART RATE: 67 BPM | TEMPERATURE: 97 F | HEIGHT: 73 IN | RESPIRATION RATE: 15 BRPM | HEART RATE: 67 BPM | SYSTOLIC BLOOD PRESSURE: 140 MMHG | OXYGEN SATURATION: 97 % | WEIGHT: 315 LBS

## 2022-01-01 VITALS
DIASTOLIC BLOOD PRESSURE: 54 MMHG | SYSTOLIC BLOOD PRESSURE: 94 MMHG | BODY MASS INDEX: 41.75 KG/M2 | HEART RATE: 85 BPM | HEIGHT: 73 IN | RESPIRATION RATE: 19 BRPM | HEART RATE: 70 BPM | OXYGEN SATURATION: 95 % | WEIGHT: 315 LBS | HEIGHT: 73 IN | SYSTOLIC BLOOD PRESSURE: 115 MMHG | WEIGHT: 315 LBS | OXYGEN SATURATION: 97 % | DIASTOLIC BLOOD PRESSURE: 57 MMHG | BODY MASS INDEX: 41.75 KG/M2 | TEMPERATURE: 98 F | TEMPERATURE: 98 F

## 2022-01-01 VITALS
HEIGHT: 73 IN | BODY MASS INDEX: 41.75 KG/M2 | DIASTOLIC BLOOD PRESSURE: 53 MMHG | WEIGHT: 315 LBS | TEMPERATURE: 98 F | HEART RATE: 111 BPM | OXYGEN SATURATION: 100 % | BODY MASS INDEX: 41.75 KG/M2 | WEIGHT: 315 LBS | SYSTOLIC BLOOD PRESSURE: 94 MMHG | HEIGHT: 73 IN | DIASTOLIC BLOOD PRESSURE: 63 MMHG | OXYGEN SATURATION: 92 % | SYSTOLIC BLOOD PRESSURE: 126 MMHG | HEART RATE: 91 BPM

## 2022-01-01 VITALS
OXYGEN SATURATION: 98 % | TEMPERATURE: 98 F | RESPIRATION RATE: 16 BRPM | SYSTOLIC BLOOD PRESSURE: 97 MMHG | HEIGHT: 73 IN | WEIGHT: 315 LBS | HEART RATE: 87 BPM | BODY MASS INDEX: 41.75 KG/M2 | DIASTOLIC BLOOD PRESSURE: 46 MMHG

## 2022-01-01 VITALS
SYSTOLIC BLOOD PRESSURE: 134 MMHG | WEIGHT: 315 LBS | BODY MASS INDEX: 41.75 KG/M2 | WEIGHT: 315 LBS | HEART RATE: 77 BPM | HEIGHT: 73 IN | DIASTOLIC BLOOD PRESSURE: 76 MMHG | OXYGEN SATURATION: 95 % | BODY MASS INDEX: 41.75 KG/M2 | HEIGHT: 73 IN

## 2022-01-01 VITALS
WEIGHT: 315 LBS | HEART RATE: 77 BPM | HEIGHT: 73 IN | BODY MASS INDEX: 41.75 KG/M2 | OXYGEN SATURATION: 98 % | RESPIRATION RATE: 21 BRPM | DIASTOLIC BLOOD PRESSURE: 63 MMHG | SYSTOLIC BLOOD PRESSURE: 125 MMHG

## 2022-01-01 VITALS
DIASTOLIC BLOOD PRESSURE: 60 MMHG | HEART RATE: 65 BPM | OXYGEN SATURATION: 97 % | SYSTOLIC BLOOD PRESSURE: 110 MMHG | TEMPERATURE: 99 F | HEIGHT: 73 IN | WEIGHT: 315 LBS | BODY MASS INDEX: 41.75 KG/M2

## 2022-01-01 VITALS
OXYGEN SATURATION: 97 % | DIASTOLIC BLOOD PRESSURE: 51 MMHG | HEART RATE: 65 BPM | SYSTOLIC BLOOD PRESSURE: 117 MMHG | TEMPERATURE: 98 F | RESPIRATION RATE: 16 BRPM

## 2022-01-01 VITALS
BODY MASS INDEX: 41.75 KG/M2 | WEIGHT: 315 LBS | SYSTOLIC BLOOD PRESSURE: 140 MMHG | DIASTOLIC BLOOD PRESSURE: 64 MMHG | RESPIRATION RATE: 20 BRPM | HEIGHT: 73 IN | TEMPERATURE: 99 F | OXYGEN SATURATION: 97 % | HEART RATE: 82 BPM

## 2022-01-01 VITALS
WEIGHT: 315 LBS | HEIGHT: 73 IN | BODY MASS INDEX: 41.75 KG/M2 | SYSTOLIC BLOOD PRESSURE: 132 MMHG | DIASTOLIC BLOOD PRESSURE: 70 MMHG

## 2022-01-01 VITALS
DIASTOLIC BLOOD PRESSURE: 76 MMHG | RESPIRATION RATE: 18 BRPM | BODY MASS INDEX: 41.75 KG/M2 | WEIGHT: 315 LBS | HEART RATE: 76 BPM | HEART RATE: 81 BPM | SYSTOLIC BLOOD PRESSURE: 99 MMHG | SYSTOLIC BLOOD PRESSURE: 98 MMHG | DIASTOLIC BLOOD PRESSURE: 54 MMHG | OXYGEN SATURATION: 96 % | WEIGHT: 315 LBS | TEMPERATURE: 98 F | HEIGHT: 73 IN | DIASTOLIC BLOOD PRESSURE: 47 MMHG | BODY MASS INDEX: 41.75 KG/M2 | HEART RATE: 73 BPM | WEIGHT: 315 LBS | HEIGHT: 73 IN | SYSTOLIC BLOOD PRESSURE: 87 MMHG | RESPIRATION RATE: 15 BRPM | RESPIRATION RATE: 16 BRPM | BODY MASS INDEX: 41.75 KG/M2 | HEART RATE: 76 BPM | BODY MASS INDEX: 41.75 KG/M2 | DIASTOLIC BLOOD PRESSURE: 58 MMHG | RESPIRATION RATE: 16 BRPM | OXYGEN SATURATION: 94 % | OXYGEN SATURATION: 95 % | SYSTOLIC BLOOD PRESSURE: 101 MMHG | HEIGHT: 73 IN | WEIGHT: 315 LBS | HEIGHT: 73 IN

## 2022-01-01 VITALS
TEMPERATURE: 98 F | HEIGHT: 73 IN | SYSTOLIC BLOOD PRESSURE: 135 MMHG | BODY MASS INDEX: 41.75 KG/M2 | RESPIRATION RATE: 16 BRPM | DIASTOLIC BLOOD PRESSURE: 60 MMHG | OXYGEN SATURATION: 99 % | WEIGHT: 315 LBS | HEART RATE: 86 BPM

## 2022-01-01 VITALS
SYSTOLIC BLOOD PRESSURE: 126 MMHG | DIASTOLIC BLOOD PRESSURE: 64 MMHG | WEIGHT: 315 LBS | HEIGHT: 73 IN | BODY MASS INDEX: 41.75 KG/M2

## 2022-01-01 VITALS
HEIGHT: 73 IN | SYSTOLIC BLOOD PRESSURE: 138 MMHG | HEART RATE: 75 BPM | BODY MASS INDEX: 41.75 KG/M2 | OXYGEN SATURATION: 97 % | HEART RATE: 77 BPM | DIASTOLIC BLOOD PRESSURE: 86 MMHG | HEIGHT: 73 IN | TEMPERATURE: 97 F | WEIGHT: 315 LBS | RESPIRATION RATE: 20 BRPM | WEIGHT: 315 LBS | BODY MASS INDEX: 41.75 KG/M2 | OXYGEN SATURATION: 96 % | SYSTOLIC BLOOD PRESSURE: 118 MMHG | TEMPERATURE: 98 F | RESPIRATION RATE: 18 BRPM | DIASTOLIC BLOOD PRESSURE: 65 MMHG

## 2022-01-01 VITALS
OXYGEN SATURATION: 100 % | SYSTOLIC BLOOD PRESSURE: 130 MMHG | BODY MASS INDEX: 43.54 KG/M2 | HEART RATE: 59 BPM | RESPIRATION RATE: 20 BRPM | TEMPERATURE: 98 F | WEIGHT: 315 LBS | DIASTOLIC BLOOD PRESSURE: 60 MMHG

## 2022-01-01 VITALS
BODY MASS INDEX: 41.75 KG/M2 | DIASTOLIC BLOOD PRESSURE: 58 MMHG | SYSTOLIC BLOOD PRESSURE: 121 MMHG | WEIGHT: 315 LBS | HEIGHT: 73 IN | HEART RATE: 84 BPM

## 2022-01-01 VITALS — DIASTOLIC BLOOD PRESSURE: 43 MMHG | SYSTOLIC BLOOD PRESSURE: 106 MMHG

## 2022-01-01 VITALS — BODY MASS INDEX: 41.75 KG/M2 | HEIGHT: 73 IN | WEIGHT: 315 LBS

## 2022-01-01 VITALS
HEIGHT: 73 IN | SYSTOLIC BLOOD PRESSURE: 97 MMHG | DIASTOLIC BLOOD PRESSURE: 61 MMHG | OXYGEN SATURATION: 88 % | BODY MASS INDEX: 41.75 KG/M2 | WEIGHT: 315 LBS | HEART RATE: 91 BPM

## 2022-01-01 VITALS — WEIGHT: 315 LBS | BODY MASS INDEX: 41.75 KG/M2 | HEIGHT: 73 IN

## 2022-01-01 VITALS
SYSTOLIC BLOOD PRESSURE: 113 MMHG | TEMPERATURE: 99 F | HEART RATE: 83 BPM | DIASTOLIC BLOOD PRESSURE: 48 MMHG | RESPIRATION RATE: 16 BRPM | OXYGEN SATURATION: 100 %

## 2022-01-01 VITALS
SYSTOLIC BLOOD PRESSURE: 119 MMHG | DIASTOLIC BLOOD PRESSURE: 64 MMHG | OXYGEN SATURATION: 93 % | BODY MASS INDEX: 42.67 KG/M2 | WEIGHT: 315 LBS

## 2022-01-01 VITALS
TEMPERATURE: 98 F | RESPIRATION RATE: 20 BRPM | OXYGEN SATURATION: 99 % | WEIGHT: 315 LBS | HEIGHT: 73 IN | HEART RATE: 57 BPM | BODY MASS INDEX: 41.75 KG/M2 | DIASTOLIC BLOOD PRESSURE: 57 MMHG | SYSTOLIC BLOOD PRESSURE: 127 MMHG

## 2022-01-01 VITALS
SYSTOLIC BLOOD PRESSURE: 129 MMHG | DIASTOLIC BLOOD PRESSURE: 74 MMHG | BODY MASS INDEX: 41.75 KG/M2 | WEIGHT: 315 LBS | HEIGHT: 73 IN | OXYGEN SATURATION: 95 % | TEMPERATURE: 97 F | HEART RATE: 63 BPM | RESPIRATION RATE: 20 BRPM

## 2022-01-01 VITALS
DIASTOLIC BLOOD PRESSURE: 52 MMHG | RESPIRATION RATE: 16 BRPM | SYSTOLIC BLOOD PRESSURE: 123 MMHG | HEART RATE: 84 BPM | TEMPERATURE: 99 F | OXYGEN SATURATION: 96 %

## 2022-01-01 VITALS
TEMPERATURE: 98 F | DIASTOLIC BLOOD PRESSURE: 65 MMHG | SYSTOLIC BLOOD PRESSURE: 146 MMHG | OXYGEN SATURATION: 95 % | RESPIRATION RATE: 16 BRPM | HEART RATE: 65 BPM

## 2022-01-01 VITALS
SYSTOLIC BLOOD PRESSURE: 135 MMHG | DIASTOLIC BLOOD PRESSURE: 58 MMHG | HEART RATE: 71 BPM | RESPIRATION RATE: 16 BRPM | OXYGEN SATURATION: 97 %

## 2022-01-01 DIAGNOSIS — G47.33 OSA ON CPAP: ICD-10-CM

## 2022-01-01 DIAGNOSIS — I10 HYPERTENSION, UNSPECIFIED TYPE: ICD-10-CM

## 2022-01-01 DIAGNOSIS — E78.5 HYPERLIPIDEMIA, UNSPECIFIED HYPERLIPIDEMIA TYPE: ICD-10-CM

## 2022-01-01 DIAGNOSIS — L82.1 SK (SEBORRHEIC KERATOSIS): ICD-10-CM

## 2022-01-01 DIAGNOSIS — J34.0 CELLULITIS OF NOSE: ICD-10-CM

## 2022-01-01 DIAGNOSIS — C76.0 HEAD AND NECK CANCER: Primary | ICD-10-CM

## 2022-01-01 DIAGNOSIS — H52.4 ASTIGMATISM WITH PRESBYOPIA, BILATERAL: ICD-10-CM

## 2022-01-01 DIAGNOSIS — R06.09 DYSPNEA ON EXERTION: ICD-10-CM

## 2022-01-01 DIAGNOSIS — R06.02 SHORTNESS OF BREATH: ICD-10-CM

## 2022-01-01 DIAGNOSIS — I87.8 VENOUS STASIS OF LOWER EXTREMITY: ICD-10-CM

## 2022-01-01 DIAGNOSIS — I73.9 PVD (PERIPHERAL VASCULAR DISEASE): Primary | ICD-10-CM

## 2022-01-01 DIAGNOSIS — C76.0 HEAD AND NECK CANCER: ICD-10-CM

## 2022-01-01 DIAGNOSIS — G89.29 INSOMNIA SECONDARY TO CHRONIC PAIN: ICD-10-CM

## 2022-01-01 DIAGNOSIS — L90.9 PLANTAR FAT PAD ATROPHY: ICD-10-CM

## 2022-01-01 DIAGNOSIS — I48.0 PAROXYSMAL ATRIAL FIBRILLATION: ICD-10-CM

## 2022-01-01 DIAGNOSIS — I10 ESSENTIAL HYPERTENSION: ICD-10-CM

## 2022-01-01 DIAGNOSIS — R06.02 SOB (SHORTNESS OF BREATH): ICD-10-CM

## 2022-01-01 DIAGNOSIS — L84 PRE-ULCERATIVE CALLUSES: ICD-10-CM

## 2022-01-01 DIAGNOSIS — R07.0 THROAT PAIN IN ADULT: Primary | ICD-10-CM

## 2022-01-01 DIAGNOSIS — H52.4 PRESBYOPIA: ICD-10-CM

## 2022-01-01 DIAGNOSIS — R53.81 DEBILITY: ICD-10-CM

## 2022-01-01 DIAGNOSIS — G47.01 INSOMNIA SECONDARY TO CHRONIC PAIN: ICD-10-CM

## 2022-01-01 DIAGNOSIS — C80.1 CANCER: Primary | ICD-10-CM

## 2022-01-01 DIAGNOSIS — C77.0 SECONDARY MALIGNANT NEOPLASM OF CERVICAL LYMPH NODE: ICD-10-CM

## 2022-01-01 DIAGNOSIS — R22.1 MASS OF RIGHT SIDE OF NECK: ICD-10-CM

## 2022-01-01 DIAGNOSIS — Z79.899 IMMUNODEFICIENCY DUE TO DRUG THERAPY: ICD-10-CM

## 2022-01-01 DIAGNOSIS — N39.0 URINARY TRACT INFECTION WITHOUT HEMATURIA, SITE UNSPECIFIED: ICD-10-CM

## 2022-01-01 DIAGNOSIS — E78.49 OTHER HYPERLIPIDEMIA: ICD-10-CM

## 2022-01-01 DIAGNOSIS — R59.1 LYMPHADENOPATHY: ICD-10-CM

## 2022-01-01 DIAGNOSIS — C77.0 SECONDARY MALIGNANT NEOPLASM OF CERVICAL LYMPH NODE: Primary | ICD-10-CM

## 2022-01-01 DIAGNOSIS — R59.0 LYMPHADENOPATHY OF HEAD AND NECK REGION: Primary | ICD-10-CM

## 2022-01-01 DIAGNOSIS — R82.90 ABNORMAL URINALYSIS: ICD-10-CM

## 2022-01-01 DIAGNOSIS — R09.02 HYPOXIA: ICD-10-CM

## 2022-01-01 DIAGNOSIS — H92.03 EAR DISCOMFORT, BILATERAL: ICD-10-CM

## 2022-01-01 DIAGNOSIS — N17.0 ACUTE RENAL FAILURE WITH ACUTE TUBULAR NECROSIS SUPERIMPOSED ON STAGE 3A CHRONIC KIDNEY DISEASE: ICD-10-CM

## 2022-01-01 DIAGNOSIS — R35.1 NOCTURIA MORE THAN TWICE PER NIGHT: ICD-10-CM

## 2022-01-01 DIAGNOSIS — R60.9 EDEMA, UNSPECIFIED TYPE: ICD-10-CM

## 2022-01-01 DIAGNOSIS — R29.898 SHOULDER WEAKNESS: ICD-10-CM

## 2022-01-01 DIAGNOSIS — R59.1 LYMPHADENOPATHY: Primary | ICD-10-CM

## 2022-01-01 DIAGNOSIS — Z86.19 HISTORY OF CLOSTRIDIOIDES DIFFICILE COLITIS: ICD-10-CM

## 2022-01-01 DIAGNOSIS — R22.1 NECK MASS: ICD-10-CM

## 2022-01-01 DIAGNOSIS — C61 PROSTATE CANCER: ICD-10-CM

## 2022-01-01 DIAGNOSIS — Z01.818 PREOPERATIVE TESTING: Primary | ICD-10-CM

## 2022-01-01 DIAGNOSIS — I50.30 HEART FAILURE WITH PRESERVED EJECTION FRACTION, UNSPECIFIED HF CHRONICITY: ICD-10-CM

## 2022-01-01 DIAGNOSIS — I50.32 CHRONIC HEART FAILURE WITH PRESERVED EJECTION FRACTION: ICD-10-CM

## 2022-01-01 DIAGNOSIS — N39.0 UTI (URINARY TRACT INFECTION), UNCOMPLICATED: ICD-10-CM

## 2022-01-01 DIAGNOSIS — R59.0 LOCALIZED ENLARGED LYMPH NODES: ICD-10-CM

## 2022-01-01 DIAGNOSIS — J98.4 RESTRICTIVE LUNG DISEASE: ICD-10-CM

## 2022-01-01 DIAGNOSIS — D84.821 IMMUNODEFICIENCY DUE TO DRUG THERAPY: ICD-10-CM

## 2022-01-01 DIAGNOSIS — Z86.39 HISTORY OF HYPERKALEMIA: ICD-10-CM

## 2022-01-01 DIAGNOSIS — C44.82 SQUAMOUS CELL CARCINOMA OF OVERLAPPING SITES OF SKIN: ICD-10-CM

## 2022-01-01 DIAGNOSIS — I12.9 BENIGN HYPERTENSION WITH CHRONIC KIDNEY DISEASE, STAGE III: ICD-10-CM

## 2022-01-01 DIAGNOSIS — L57.0 AK (ACTINIC KERATOSIS): ICD-10-CM

## 2022-01-01 DIAGNOSIS — I65.23 BILATERAL CAROTID ARTERY STENOSIS: ICD-10-CM

## 2022-01-01 DIAGNOSIS — M79.605 LEFT LEG PAIN: ICD-10-CM

## 2022-01-01 DIAGNOSIS — I48.91 ATRIAL FIBRILLATION WITH RAPID VENTRICULAR RESPONSE: Primary | ICD-10-CM

## 2022-01-01 DIAGNOSIS — C44.82 SQUAMOUS CELL CARCINOMA OF OVERLAPPING SITES OF SKIN: Primary | ICD-10-CM

## 2022-01-01 DIAGNOSIS — D64.9 ANEMIA, UNSPECIFIED TYPE: ICD-10-CM

## 2022-01-01 DIAGNOSIS — I48.0 PAROXYSMAL ATRIAL FIBRILLATION: Primary | ICD-10-CM

## 2022-01-01 DIAGNOSIS — G89.3 CANCER RELATED PAIN: Primary | ICD-10-CM

## 2022-01-01 DIAGNOSIS — Z87.09 PERSONAL HISTORY OF ASBESTOSIS: ICD-10-CM

## 2022-01-01 DIAGNOSIS — D69.6 THROMBOCYTOPENIA, UNSPECIFIED: ICD-10-CM

## 2022-01-01 DIAGNOSIS — C61 MALIGNANT NEOPLASM OF PROSTATE: Primary | ICD-10-CM

## 2022-01-01 DIAGNOSIS — J61 ASBESTOSIS: ICD-10-CM

## 2022-01-01 DIAGNOSIS — I25.10 CORONARY ARTERY DISEASE INVOLVING NATIVE CORONARY ARTERY OF NATIVE HEART WITHOUT ANGINA PECTORIS: ICD-10-CM

## 2022-01-01 DIAGNOSIS — Z87.81 HISTORY OF TIBIAL FRACTURE: Primary | ICD-10-CM

## 2022-01-01 DIAGNOSIS — N13.8 BENIGN PROSTATIC HYPERPLASIA WITH URINARY OBSTRUCTION: ICD-10-CM

## 2022-01-01 DIAGNOSIS — F33.0 MAJOR DEPRESSIVE DISORDER, RECURRENT EPISODE, MILD: ICD-10-CM

## 2022-01-01 DIAGNOSIS — R22.1 NECK MASS: Primary | ICD-10-CM

## 2022-01-01 DIAGNOSIS — J96.11 CHRONIC RESPIRATORY FAILURE WITH HYPOXIA: ICD-10-CM

## 2022-01-01 DIAGNOSIS — E66.01 MORBID OBESITY WITH BODY MASS INDEX OF 45.0-49.9 IN ADULT: ICD-10-CM

## 2022-01-01 DIAGNOSIS — J84.9 INTERSTITIAL PULMONARY DISEASE, UNSPECIFIED: ICD-10-CM

## 2022-01-01 DIAGNOSIS — Z00.00 ROUTINE MEDICAL EXAM: Primary | ICD-10-CM

## 2022-01-01 DIAGNOSIS — C80.1 CANCER: ICD-10-CM

## 2022-01-01 DIAGNOSIS — R60.0 BILATERAL LEG EDEMA: ICD-10-CM

## 2022-01-01 DIAGNOSIS — R52 PAIN: ICD-10-CM

## 2022-01-01 DIAGNOSIS — N18.30 BENIGN HYPERTENSION WITH CHRONIC KIDNEY DISEASE, STAGE III: ICD-10-CM

## 2022-01-01 DIAGNOSIS — H90.3 SENSORINEURAL HEARING LOSS (SNHL) OF BOTH EARS: ICD-10-CM

## 2022-01-01 DIAGNOSIS — C44.42 SQUAMOUS CELL CARCINOMA OF NECK: ICD-10-CM

## 2022-01-01 DIAGNOSIS — T80.219A INFECTION OF VENOUS ACCESS PORT, INITIAL ENCOUNTER: ICD-10-CM

## 2022-01-01 DIAGNOSIS — H61.20 IMPACTED CERUMEN, UNSPECIFIED LATERALITY: Primary | ICD-10-CM

## 2022-01-01 DIAGNOSIS — N17.9 AKI (ACUTE KIDNEY INJURY): ICD-10-CM

## 2022-01-01 DIAGNOSIS — R39.9 UTI SYMPTOMS: ICD-10-CM

## 2022-01-01 DIAGNOSIS — Z71.89 GOALS OF CARE, COUNSELING/DISCUSSION: ICD-10-CM

## 2022-01-01 DIAGNOSIS — Z78.9 HISTORY OF EXCESSIVE CERUMEN: ICD-10-CM

## 2022-01-01 DIAGNOSIS — Z96.1 PSEUDOPHAKIA OF BOTH EYES: ICD-10-CM

## 2022-01-01 DIAGNOSIS — J98.4 SMALL AIRWAYS DISEASE: ICD-10-CM

## 2022-01-01 DIAGNOSIS — L58.9 RADIATION DERMATITIS: ICD-10-CM

## 2022-01-01 DIAGNOSIS — N18.31 ACUTE RENAL FAILURE WITH ACUTE TUBULAR NECROSIS SUPERIMPOSED ON STAGE 3A CHRONIC KIDNEY DISEASE: ICD-10-CM

## 2022-01-01 DIAGNOSIS — I73.9 PVD (PERIPHERAL VASCULAR DISEASE): ICD-10-CM

## 2022-01-01 DIAGNOSIS — R59.0 LAD (LYMPHADENOPATHY) OF RIGHT CERVICAL REGION: Primary | ICD-10-CM

## 2022-01-01 DIAGNOSIS — G47.33 OSA ON CPAP: Primary | ICD-10-CM

## 2022-01-01 DIAGNOSIS — R39.15 URINARY URGENCY: ICD-10-CM

## 2022-01-01 DIAGNOSIS — Z85.828 PERSONAL HISTORY OF SKIN CANCER: ICD-10-CM

## 2022-01-01 DIAGNOSIS — R22.9 MULTIPLE SKIN NODULES: ICD-10-CM

## 2022-01-01 DIAGNOSIS — G47.33 OSA (OBSTRUCTIVE SLEEP APNEA): Primary | ICD-10-CM

## 2022-01-01 DIAGNOSIS — N18.32 STAGE 3B CHRONIC KIDNEY DISEASE: ICD-10-CM

## 2022-01-01 DIAGNOSIS — I87.2 STASIS DERMATITIS OF BOTH LEGS: ICD-10-CM

## 2022-01-01 DIAGNOSIS — R39.9 UTI SYMPTOMS: Primary | ICD-10-CM

## 2022-01-01 DIAGNOSIS — N18.30 STAGE 3 CHRONIC KIDNEY DISEASE, UNSPECIFIED WHETHER STAGE 3A OR 3B CKD: ICD-10-CM

## 2022-01-01 DIAGNOSIS — I50.30 HEART FAILURE WITH PRESERVED EJECTION FRACTION, UNSPECIFIED HF CHRONICITY: Primary | ICD-10-CM

## 2022-01-01 DIAGNOSIS — E66.01 MORBID OBESITY WITH BMI OF 40.0-44.9, ADULT: ICD-10-CM

## 2022-01-01 DIAGNOSIS — L57.0 AK (ACTINIC KERATOSIS): Primary | ICD-10-CM

## 2022-01-01 DIAGNOSIS — J61 ASBESTOSIS: Primary | ICD-10-CM

## 2022-01-01 DIAGNOSIS — M79.675 PAIN OF TOE OF LEFT FOOT: ICD-10-CM

## 2022-01-01 DIAGNOSIS — M79.606 LEG PAIN: ICD-10-CM

## 2022-01-01 DIAGNOSIS — D49.9 NEOPLASM: ICD-10-CM

## 2022-01-01 DIAGNOSIS — R42 ORTHOSTATIC DIZZINESS: Primary | ICD-10-CM

## 2022-01-01 DIAGNOSIS — N18.32 STAGE 3B CHRONIC KIDNEY DISEASE: Primary | ICD-10-CM

## 2022-01-01 DIAGNOSIS — G47.01 INSOMNIA DUE TO MEDICAL CONDITION: ICD-10-CM

## 2022-01-01 DIAGNOSIS — C44.81 BASAL CELL CARCINOMA (BCC) OF OVERLAPPING SITES OF SKIN: ICD-10-CM

## 2022-01-01 DIAGNOSIS — R42 ORTHOSTATIC DIZZINESS: ICD-10-CM

## 2022-01-01 DIAGNOSIS — I47.9 TACHYCARDIA, PAROXYSMAL: ICD-10-CM

## 2022-01-01 DIAGNOSIS — Z85.828 HISTORY OF SKIN CANCER: ICD-10-CM

## 2022-01-01 DIAGNOSIS — J34.89 NASAL CRUSTING: Primary | ICD-10-CM

## 2022-01-01 DIAGNOSIS — D53.9 MACROCYTIC ANEMIA: ICD-10-CM

## 2022-01-01 DIAGNOSIS — Z97.4 WEARS HEARING AID IN BOTH EARS: ICD-10-CM

## 2022-01-01 DIAGNOSIS — R53.1 WEAKNESS: ICD-10-CM

## 2022-01-01 DIAGNOSIS — H04.123 DRY EYE SYNDROME OF BOTH EYES: ICD-10-CM

## 2022-01-01 DIAGNOSIS — N17.9 AKI (ACUTE KIDNEY INJURY): Primary | ICD-10-CM

## 2022-01-01 DIAGNOSIS — Z95.828 PORT-A-CATH IN PLACE: ICD-10-CM

## 2022-01-01 DIAGNOSIS — R59.0 CERVICAL LYMPHADENOPATHY: ICD-10-CM

## 2022-01-01 DIAGNOSIS — J96.11 CHRONIC RESPIRATORY FAILURE WITH HYPOXIA: Primary | ICD-10-CM

## 2022-01-01 DIAGNOSIS — R65.20 SEVERE SEPSIS: Primary | ICD-10-CM

## 2022-01-01 DIAGNOSIS — I48.91 A-FIB: ICD-10-CM

## 2022-01-01 DIAGNOSIS — C61 MALIGNANT NEOPLASM OF PROSTATE: ICD-10-CM

## 2022-01-01 DIAGNOSIS — A41.9 SEVERE SEPSIS: Primary | ICD-10-CM

## 2022-01-01 DIAGNOSIS — H04.123 DRY EYE SYNDROME OF BOTH EYES: Primary | ICD-10-CM

## 2022-01-01 DIAGNOSIS — D49.9 NEOPLASM: Primary | ICD-10-CM

## 2022-01-01 DIAGNOSIS — R49.0 DYSPHONIA: ICD-10-CM

## 2022-01-01 DIAGNOSIS — I82.621 ACUTE DEEP VEIN THROMBOSIS (DVT) OF RIGHT UPPER EXTREMITY, UNSPECIFIED VEIN: ICD-10-CM

## 2022-01-01 DIAGNOSIS — L02.213 CHEST WALL ABSCESS: Primary | ICD-10-CM

## 2022-01-01 DIAGNOSIS — R29.898 SHOULDER WEAKNESS: Primary | ICD-10-CM

## 2022-01-01 DIAGNOSIS — N52.9 ED (ERECTILE DYSFUNCTION) OF ORGANIC ORIGIN: ICD-10-CM

## 2022-01-01 DIAGNOSIS — I87.2 VENOUS STASIS DERMATITIS OF BOTH LOWER EXTREMITIES: ICD-10-CM

## 2022-01-01 DIAGNOSIS — R42 DIZZINESS: ICD-10-CM

## 2022-01-01 DIAGNOSIS — N40.1 BENIGN PROSTATIC HYPERPLASIA WITH URINARY OBSTRUCTION: ICD-10-CM

## 2022-01-01 DIAGNOSIS — R13.10 PAIN WITH SWALLOWING: ICD-10-CM

## 2022-01-01 DIAGNOSIS — R09.81 NASAL CONGESTION: ICD-10-CM

## 2022-01-01 DIAGNOSIS — Z01.812 PRE-PROCEDURE LAB EXAM: Primary | ICD-10-CM

## 2022-01-01 DIAGNOSIS — I87.2 VENOUS STASIS DERMATITIS, UNSPECIFIED LATERALITY: ICD-10-CM

## 2022-01-01 DIAGNOSIS — N25.81 SECONDARY HYPERPARATHYROIDISM OF RENAL ORIGIN: ICD-10-CM

## 2022-01-01 DIAGNOSIS — B00.52 HERPES SIMPLEX DISCIFORM KERATITIS: ICD-10-CM

## 2022-01-01 DIAGNOSIS — R52 PAIN: Primary | ICD-10-CM

## 2022-01-01 DIAGNOSIS — N18.31 STAGE 3A CHRONIC KIDNEY DISEASE: ICD-10-CM

## 2022-01-01 DIAGNOSIS — Z09 HOSPITAL DISCHARGE FOLLOW-UP: Primary | ICD-10-CM

## 2022-01-01 DIAGNOSIS — K59.00 CONSTIPATION, UNSPECIFIED CONSTIPATION TYPE: ICD-10-CM

## 2022-01-01 DIAGNOSIS — E21.3 HYPERPARATHYROIDISM: ICD-10-CM

## 2022-01-01 DIAGNOSIS — C76.0 RECENT DIAGNOSIS OF HEAD AND NECK CANCER: Primary | ICD-10-CM

## 2022-01-01 DIAGNOSIS — Z79.01 CHRONIC ANTICOAGULATION: ICD-10-CM

## 2022-01-01 DIAGNOSIS — Z01.00 ROUTINE EYE EXAM: Primary | ICD-10-CM

## 2022-01-01 DIAGNOSIS — E83.39 HYPOPHOSPHATEMIA: ICD-10-CM

## 2022-01-01 DIAGNOSIS — R06.02 SOB (SHORTNESS OF BREATH): Primary | ICD-10-CM

## 2022-01-01 DIAGNOSIS — N25.81 SECONDARY HYPERPARATHYROIDISM: ICD-10-CM

## 2022-01-01 DIAGNOSIS — R59.0 CERVICAL LYMPHADENOPATHY: Primary | ICD-10-CM

## 2022-01-01 DIAGNOSIS — K21.9 GASTROESOPHAGEAL REFLUX DISEASE, UNSPECIFIED WHETHER ESOPHAGITIS PRESENT: ICD-10-CM

## 2022-01-01 DIAGNOSIS — H52.203 ASTIGMATISM WITH PRESBYOPIA, BILATERAL: ICD-10-CM

## 2022-01-01 DIAGNOSIS — K14.6 TONGUE PAIN: ICD-10-CM

## 2022-01-01 DIAGNOSIS — D61.818 PANCYTOPENIA: ICD-10-CM

## 2022-01-01 DIAGNOSIS — L84 PRE-ULCERATIVE CALLUSES: Primary | ICD-10-CM

## 2022-01-01 DIAGNOSIS — R42 VERTIGO: ICD-10-CM

## 2022-01-01 DIAGNOSIS — D48.5 NEOPLASM OF UNCERTAIN BEHAVIOR OF SKIN: Primary | ICD-10-CM

## 2022-01-01 DIAGNOSIS — I47.9 TACHYCARDIA, PAROXYSMAL: Primary | ICD-10-CM

## 2022-01-01 DIAGNOSIS — Z85.46 H/O PROSTATE CANCER: ICD-10-CM

## 2022-01-01 DIAGNOSIS — R59.0 LAD (LYMPHADENOPATHY) OF RIGHT CERVICAL REGION: ICD-10-CM

## 2022-01-01 LAB
25(OH)D3+25(OH)D2 SERPL-MCNC: 32 NG/ML (ref 30–96)
25(OH)D3+25(OH)D2 SERPL-MCNC: 32 NG/ML (ref 30–96)
ABO + RH BLD: NORMAL
ALBUMIN SERPL BCP-MCNC: 2.5 G/DL (ref 3.5–5.2)
ALBUMIN SERPL BCP-MCNC: 2.6 G/DL (ref 3.5–5.2)
ALBUMIN SERPL BCP-MCNC: 2.7 G/DL (ref 3.5–5.2)
ALBUMIN SERPL BCP-MCNC: 2.8 G/DL (ref 3.5–5.2)
ALBUMIN SERPL BCP-MCNC: 2.9 G/DL (ref 3.5–5.2)
ALBUMIN SERPL BCP-MCNC: 3 G/DL (ref 3.5–5.2)
ALBUMIN SERPL BCP-MCNC: 3.1 G/DL (ref 3.5–5.2)
ALBUMIN SERPL BCP-MCNC: 3.1 G/DL (ref 3.5–5.2)
ALBUMIN SERPL BCP-MCNC: 3.2 G/DL (ref 3.5–5.2)
ALBUMIN SERPL BCP-MCNC: 3.3 G/DL (ref 3.5–5.2)
ALBUMIN SERPL BCP-MCNC: 3.3 G/DL (ref 3.5–5.2)
ALBUMIN SERPL BCP-MCNC: 3.4 G/DL (ref 3.5–5.2)
ALBUMIN SERPL BCP-MCNC: 3.5 G/DL (ref 3.5–5.2)
ALBUMIN SERPL BCP-MCNC: 3.6 G/DL (ref 3.5–5.2)
ALP SERPL-CCNC: 101 U/L (ref 55–135)
ALP SERPL-CCNC: 103 U/L (ref 55–135)
ALP SERPL-CCNC: 103 U/L (ref 55–135)
ALP SERPL-CCNC: 104 U/L (ref 55–135)
ALP SERPL-CCNC: 106 U/L (ref 55–135)
ALP SERPL-CCNC: 108 U/L (ref 55–135)
ALP SERPL-CCNC: 115 U/L (ref 55–135)
ALP SERPL-CCNC: 117 U/L (ref 55–135)
ALP SERPL-CCNC: 119 U/L (ref 55–135)
ALP SERPL-CCNC: 121 U/L (ref 55–135)
ALP SERPL-CCNC: 122 U/L (ref 55–135)
ALP SERPL-CCNC: 124 U/L (ref 55–135)
ALP SERPL-CCNC: 131 U/L (ref 55–135)
ALP SERPL-CCNC: 132 U/L (ref 55–135)
ALP SERPL-CCNC: 133 U/L (ref 55–135)
ALP SERPL-CCNC: 134 U/L (ref 55–135)
ALP SERPL-CCNC: 137 U/L (ref 55–135)
ALP SERPL-CCNC: 139 U/L (ref 55–135)
ALP SERPL-CCNC: 150 U/L (ref 55–135)
ALP SERPL-CCNC: 79 U/L (ref 55–135)
ALP SERPL-CCNC: 83 U/L (ref 55–135)
ALP SERPL-CCNC: 84 U/L (ref 55–135)
ALP SERPL-CCNC: 86 U/L (ref 55–135)
ALP SERPL-CCNC: 88 U/L (ref 55–135)
ALT SERPL W/O P-5'-P-CCNC: 12 U/L (ref 10–44)
ALT SERPL W/O P-5'-P-CCNC: 14 U/L (ref 10–44)
ALT SERPL W/O P-5'-P-CCNC: 15 U/L (ref 10–44)
ALT SERPL W/O P-5'-P-CCNC: 16 U/L (ref 10–44)
ALT SERPL W/O P-5'-P-CCNC: 17 U/L (ref 10–44)
ALT SERPL W/O P-5'-P-CCNC: 18 U/L (ref 10–44)
ALT SERPL W/O P-5'-P-CCNC: 19 U/L (ref 10–44)
ALT SERPL W/O P-5'-P-CCNC: 20 U/L (ref 10–44)
ALT SERPL W/O P-5'-P-CCNC: 21 U/L (ref 10–44)
ALT SERPL W/O P-5'-P-CCNC: 21 U/L (ref 10–44)
ALT SERPL W/O P-5'-P-CCNC: 24 U/L (ref 10–44)
ALT SERPL W/O P-5'-P-CCNC: 27 U/L (ref 10–44)
ALT SERPL W/O P-5'-P-CCNC: 28 U/L (ref 10–44)
ALT SERPL W/O P-5'-P-CCNC: 34 U/L (ref 10–44)
ALT SERPL W/O P-5'-P-CCNC: 40 U/L (ref 10–44)
ANION GAP SERPL CALC-SCNC: 10 MMOL/L (ref 8–16)
ANION GAP SERPL CALC-SCNC: 11 MMOL/L (ref 8–16)
ANION GAP SERPL CALC-SCNC: 12 MMOL/L (ref 8–16)
ANION GAP SERPL CALC-SCNC: 13 MMOL/L (ref 8–16)
ANION GAP SERPL CALC-SCNC: 6 MMOL/L (ref 8–16)
ANION GAP SERPL CALC-SCNC: 7 MMOL/L (ref 8–16)
ANION GAP SERPL CALC-SCNC: 8 MMOL/L (ref 8–16)
ANION GAP SERPL CALC-SCNC: 9 MMOL/L (ref 8–16)
AORTIC ROOT ANNULUS: 2.97 CM
AORTIC ROOT ANNULUS: 3.34 CM
AORTIC VALVE CUSP SEPERATION: 1.91 CM
AORTIC VALVE CUSP SEPERATION: 2.02 CM
APTT BLDCRRT: 126.7 SEC (ref 21–32)
APTT BLDCRRT: 141.8 SEC (ref 21–32)
APTT BLDCRRT: 23.1 SEC (ref 21–32)
APTT BLDCRRT: 34.9 SEC (ref 21–32)
APTT BLDCRRT: 36.5 SEC (ref 21–32)
APTT BLDCRRT: 68 SEC (ref 21–32)
APTT BLDCRRT: >150 SEC (ref 21–32)
APTT BLDCRRT: >150 SEC (ref 21–32)
ASCENDING AORTA: 3.89 CM
ASCENDING AORTA: 3.89 CM
AST SERPL-CCNC: 10 U/L (ref 10–40)
AST SERPL-CCNC: 11 U/L (ref 10–40)
AST SERPL-CCNC: 11 U/L (ref 10–40)
AST SERPL-CCNC: 12 U/L (ref 10–40)
AST SERPL-CCNC: 12 U/L (ref 10–40)
AST SERPL-CCNC: 13 U/L (ref 10–40)
AST SERPL-CCNC: 13 U/L (ref 10–40)
AST SERPL-CCNC: 14 U/L (ref 10–40)
AST SERPL-CCNC: 15 U/L (ref 10–40)
AST SERPL-CCNC: 16 U/L (ref 10–40)
AST SERPL-CCNC: 18 U/L (ref 10–40)
AST SERPL-CCNC: 18 U/L (ref 10–40)
AST SERPL-CCNC: 20 U/L (ref 10–40)
AST SERPL-CCNC: 22 U/L (ref 10–40)
AST SERPL-CCNC: 23 U/L (ref 10–40)
AST SERPL-CCNC: 23 U/L (ref 10–40)
AST SERPL-CCNC: 26 U/L (ref 10–40)
AST SERPL-CCNC: 33 U/L (ref 10–40)
AST SERPL-CCNC: 41 U/L (ref 10–40)
AV INDEX (PROSTH): 0.78
AV INDEX (PROSTH): 0.93
AV MEAN GRADIENT: 6 MMHG
AV MEAN GRADIENT: 6 MMHG
AV PEAK GRADIENT: 12 MMHG
AV PEAK GRADIENT: 9 MMHG
AV VALVE AREA: 3.13 CM2
AV VALVE AREA: 3.66 CM2
AV VELOCITY RATIO: 0.73
AV VELOCITY RATIO: 0.9
BACTERIA #/AREA URNS HPF: ABNORMAL /HPF
BACTERIA BLD CULT: NORMAL
BACTERIA CATH TIP CULT: NO GROWTH
BACTERIA UR CULT: ABNORMAL
BACTERIA UR CULT: NO GROWTH
BASOPHILS # BLD AUTO: 0 K/UL (ref 0–0.2)
BASOPHILS # BLD AUTO: 0.01 K/UL (ref 0–0.2)
BASOPHILS # BLD AUTO: 0.02 K/UL (ref 0–0.2)
BASOPHILS # BLD AUTO: 0.03 K/UL (ref 0–0.2)
BASOPHILS # BLD AUTO: 0.04 K/UL (ref 0–0.2)
BASOPHILS # BLD AUTO: 0.06 K/UL (ref 0–0.2)
BASOPHILS # BLD AUTO: 0.08 K/UL (ref 0–0.2)
BASOPHILS # BLD AUTO: 0.09 K/UL (ref 0–0.2)
BASOPHILS # BLD AUTO: 0.09 K/UL (ref 0–0.2)
BASOPHILS # BLD AUTO: 0.1 K/UL (ref 0–0.2)
BASOPHILS # BLD AUTO: 0.11 K/UL (ref 0–0.2)
BASOPHILS # BLD AUTO: 0.12 K/UL (ref 0–0.2)
BASOPHILS # BLD AUTO: 0.14 K/UL (ref 0–0.2)
BASOPHILS NFR BLD: 0 % (ref 0–1.9)
BASOPHILS NFR BLD: 0.1 % (ref 0–1.9)
BASOPHILS NFR BLD: 0.2 % (ref 0–1.9)
BASOPHILS NFR BLD: 0.3 % (ref 0–1.9)
BASOPHILS NFR BLD: 0.4 % (ref 0–1.9)
BASOPHILS NFR BLD: 0.5 % (ref 0–1.9)
BASOPHILS NFR BLD: 0.6 % (ref 0–1.9)
BASOPHILS NFR BLD: 0.7 % (ref 0–1.9)
BASOPHILS NFR BLD: 0.8 % (ref 0–1.9)
BASOPHILS NFR BLD: 0.9 % (ref 0–1.9)
BASOPHILS NFR BLD: 1 % (ref 0–1.9)
BASOPHILS NFR BLD: 1 % (ref 0–1.9)
BASOPHILS NFR BLD: 1.1 % (ref 0–1.9)
BILIRUB SERPL-MCNC: 0.7 MG/DL (ref 0.1–1)
BILIRUB SERPL-MCNC: 0.7 MG/DL (ref 0.1–1)
BILIRUB SERPL-MCNC: 0.8 MG/DL (ref 0.1–1)
BILIRUB SERPL-MCNC: 0.9 MG/DL (ref 0.1–1)
BILIRUB SERPL-MCNC: 1 MG/DL (ref 0.1–1)
BILIRUB SERPL-MCNC: 1.1 MG/DL (ref 0.1–1)
BILIRUB SERPL-MCNC: 1.1 MG/DL (ref 0.1–1)
BILIRUB SERPL-MCNC: 1.2 MG/DL (ref 0.1–1)
BILIRUB SERPL-MCNC: 1.4 MG/DL (ref 0.1–1)
BILIRUB SERPL-MCNC: 2.3 MG/DL (ref 0.1–1)
BILIRUB UR QL STRIP: NEGATIVE
BLD GP AB SCN CELLS X3 SERPL QL: NORMAL
BLD PROD TYP BPU: NORMAL
BLOOD UNIT EXPIRATION DATE: NORMAL
BLOOD UNIT TYPE CODE: 600
BLOOD UNIT TYPE CODE: 6200
BLOOD UNIT TYPE CODE: 6200
BLOOD UNIT TYPE: NORMAL
BNP SERPL-MCNC: 100 PG/ML (ref 0–99)
BNP SERPL-MCNC: 208 PG/ML (ref 0–99)
BNP SERPL-MCNC: 46 PG/ML (ref 0–99)
BNP SERPL-MCNC: 60 PG/ML (ref 0–99)
BNP SERPL-MCNC: 85 PG/ML (ref 0–99)
BSA FOR ECHO PROCEDURE: 2.83 M2
BUN SERPL-MCNC: 13 MG/DL (ref 8–23)
BUN SERPL-MCNC: 14 MG/DL (ref 8–23)
BUN SERPL-MCNC: 16 MG/DL (ref 8–23)
BUN SERPL-MCNC: 16 MG/DL (ref 8–23)
BUN SERPL-MCNC: 17 MG/DL (ref 8–23)
BUN SERPL-MCNC: 18 MG/DL (ref 8–23)
BUN SERPL-MCNC: 20 MG/DL (ref 8–23)
BUN SERPL-MCNC: 24 MG/DL (ref 8–23)
BUN SERPL-MCNC: 24 MG/DL (ref 8–23)
BUN SERPL-MCNC: 25 MG/DL (ref 8–23)
BUN SERPL-MCNC: 26 MG/DL (ref 8–23)
BUN SERPL-MCNC: 26 MG/DL (ref 8–23)
BUN SERPL-MCNC: 27 MG/DL (ref 8–23)
BUN SERPL-MCNC: 29 MG/DL (ref 8–23)
BUN SERPL-MCNC: 31 MG/DL (ref 8–23)
BUN SERPL-MCNC: 32 MG/DL (ref 8–23)
BUN SERPL-MCNC: 33 MG/DL (ref 8–23)
BUN SERPL-MCNC: 33 MG/DL (ref 8–23)
BUN SERPL-MCNC: 35 MG/DL (ref 8–23)
BUN SERPL-MCNC: 35 MG/DL (ref 8–23)
BUN SERPL-MCNC: 37 MG/DL (ref 8–23)
BUN SERPL-MCNC: 47 MG/DL (ref 8–23)
C DIFF GDH STL QL: POSITIVE
C DIFF TOX A+B STL QL IA: NEGATIVE
C DIFF TOX GENS STL QL NAA+PROBE: POSITIVE
CALCIUM SERPL-MCNC: 8.3 MG/DL (ref 8.7–10.5)
CALCIUM SERPL-MCNC: 8.4 MG/DL (ref 8.7–10.5)
CALCIUM SERPL-MCNC: 8.5 MG/DL (ref 8.7–10.5)
CALCIUM SERPL-MCNC: 8.6 MG/DL (ref 8.7–10.5)
CALCIUM SERPL-MCNC: 8.7 MG/DL (ref 8.7–10.5)
CALCIUM SERPL-MCNC: 8.8 MG/DL (ref 8.7–10.5)
CALCIUM SERPL-MCNC: 8.8 MG/DL (ref 8.7–10.5)
CALCIUM SERPL-MCNC: 8.9 MG/DL (ref 8.7–10.5)
CALCIUM SERPL-MCNC: 9 MG/DL (ref 8.7–10.5)
CALCIUM SERPL-MCNC: 9.1 MG/DL (ref 8.7–10.5)
CALCIUM SERPL-MCNC: 9.2 MG/DL (ref 8.7–10.5)
CALCIUM SERPL-MCNC: 9.2 MG/DL (ref 8.7–10.5)
CALCIUM SERPL-MCNC: 9.3 MG/DL (ref 8.7–10.5)
CALCIUM SERPL-MCNC: 9.4 MG/DL (ref 8.7–10.5)
CALCIUM SERPL-MCNC: 9.5 MG/DL (ref 8.7–10.5)
CALCIUM SERPL-MCNC: 9.6 MG/DL (ref 8.7–10.5)
CALCIUM SERPL-MCNC: 9.6 MG/DL (ref 8.7–10.5)
CHLORIDE SERPL-SCNC: 101 MMOL/L (ref 95–110)
CHLORIDE SERPL-SCNC: 102 MMOL/L (ref 95–110)
CHLORIDE SERPL-SCNC: 103 MMOL/L (ref 95–110)
CHLORIDE SERPL-SCNC: 104 MMOL/L (ref 95–110)
CHLORIDE SERPL-SCNC: 105 MMOL/L (ref 95–110)
CHLORIDE SERPL-SCNC: 106 MMOL/L (ref 95–110)
CHLORIDE SERPL-SCNC: 107 MMOL/L (ref 95–110)
CHLORIDE SERPL-SCNC: 107 MMOL/L (ref 95–110)
CHLORIDE SERPL-SCNC: 108 MMOL/L (ref 95–110)
CHLORIDE SERPL-SCNC: 94 MMOL/L (ref 95–110)
CHLORIDE SERPL-SCNC: 94 MMOL/L (ref 95–110)
CHLORIDE SERPL-SCNC: 95 MMOL/L (ref 95–110)
CHLORIDE SERPL-SCNC: 95 MMOL/L (ref 95–110)
CHLORIDE SERPL-SCNC: 96 MMOL/L (ref 95–110)
CHLORIDE SERPL-SCNC: 97 MMOL/L (ref 95–110)
CHLORIDE SERPL-SCNC: 98 MMOL/L (ref 95–110)
CHLORIDE SERPL-SCNC: 99 MMOL/L (ref 95–110)
CHOLEST SERPL-MCNC: 155 MG/DL (ref 120–199)
CHOLEST/HDLC SERPL: 4.8 {RATIO} (ref 2–5)
CLARITY UR: ABNORMAL
CO2 SERPL-SCNC: 25 MMOL/L (ref 23–29)
CO2 SERPL-SCNC: 26 MMOL/L (ref 23–29)
CO2 SERPL-SCNC: 27 MMOL/L (ref 23–29)
CO2 SERPL-SCNC: 29 MMOL/L (ref 23–29)
CO2 SERPL-SCNC: 30 MMOL/L (ref 23–29)
CO2 SERPL-SCNC: 31 MMOL/L (ref 23–29)
CO2 SERPL-SCNC: 31 MMOL/L (ref 23–29)
CO2 SERPL-SCNC: 35 MMOL/L (ref 23–29)
CO2 SERPL-SCNC: 36 MMOL/L (ref 23–29)
CO2 SERPL-SCNC: 36 MMOL/L (ref 23–29)
CO2 SERPL-SCNC: 37 MMOL/L (ref 23–29)
CO2 SERPL-SCNC: 37 MMOL/L (ref 23–29)
CO2 SERPL-SCNC: 38 MMOL/L (ref 23–29)
CO2 SERPL-SCNC: 38 MMOL/L (ref 23–29)
CODING SYSTEM: NORMAL
COLOR UR: YELLOW
COMPLEXED PSA SERPL-MCNC: <0.01 NG/ML (ref 0–4)
CREAT SERPL-MCNC: 1.3 MG/DL (ref 0.5–1.4)
CREAT SERPL-MCNC: 1.3 MG/DL (ref 0.5–1.4)
CREAT SERPL-MCNC: 1.4 MG/DL (ref 0.5–1.4)
CREAT SERPL-MCNC: 1.5 MG/DL (ref 0.5–1.4)
CREAT SERPL-MCNC: 1.6 MG/DL (ref 0.5–1.4)
CREAT SERPL-MCNC: 1.6 MG/DL (ref 0.5–1.4)
CREAT SERPL-MCNC: 1.7 MG/DL (ref 0.5–1.4)
CREAT SERPL-MCNC: 1.8 MG/DL (ref 0.5–1.4)
CREAT SERPL-MCNC: 1.9 MG/DL (ref 0.5–1.4)
CREAT SERPL-MCNC: 1.9 MG/DL (ref 0.5–1.4)
CREAT SERPL-MCNC: 2 MG/DL (ref 0.5–1.4)
CREAT SERPL-MCNC: 2 MG/DL (ref 0.5–1.4)
CREAT SERPL-MCNC: 2.3 MG/DL (ref 0.5–1.4)
CREAT SERPL-MCNC: 2.4 MG/DL (ref 0.5–1.4)
CREAT SERPL-MCNC: 2.5 MG/DL (ref 0.5–1.4)
CREAT SERPL-MCNC: 2.6 MG/DL (ref 0.5–1.4)
CRP SERPL-MCNC: 43.7 MG/L (ref 0–8.2)
CTP QC/QA: YES
CV ECHO LV RWT: 0.61 CM
CV ECHO LV RWT: 0.63 CM
DIFFERENTIAL METHOD: ABNORMAL
DISPENSE STATUS: NORMAL
DOHLE BOD BLD QL SMEAR: PRESENT
DOP CALC AO PEAK VEL: 1.54 M/S
DOP CALC AO PEAK VEL: 1.71 M/S
DOP CALC AO VTI: 26.57 CM
DOP CALC AO VTI: 40.34 CM
DOP CALC LVOT AREA: 3.4 CM2
DOP CALC LVOT AREA: 4.7 CM2
DOP CALC LVOT DIAMETER: 2.07 CM
DOP CALC LVOT DIAMETER: 2.44 CM
DOP CALC LVOT PEAK VEL: 1.25 M/S
DOP CALC LVOT PEAK VEL: 1.38 M/S
DOP CALC LVOT STROKE VOLUME: 147.69 CM3
DOP CALC LVOT STROKE VOLUME: 83.22 CM3
DOP CALCLVOT PEAK VEL VTI: 24.74 CM
DOP CALCLVOT PEAK VEL VTI: 31.6 CM
E WAVE DECELERATION TIME: 194.02 MSEC
E/A RATIO: 0.86
E/E' RATIO: 9.14 M/S
ECHO LV POSTERIOR WALL: 1.38 CM (ref 0.6–1.1)
ECHO LV POSTERIOR WALL: 1.55 CM (ref 0.6–1.1)
EJECTION FRACTION: 60 %
EJECTION FRACTION: 65 %
EOSINOPHIL # BLD AUTO: 0 K/UL (ref 0–0.5)
EOSINOPHIL # BLD AUTO: 0.1 K/UL (ref 0–0.5)
EOSINOPHIL # BLD AUTO: 0.2 K/UL (ref 0–0.5)
EOSINOPHIL # BLD AUTO: 0.3 K/UL (ref 0–0.5)
EOSINOPHIL # BLD AUTO: 0.4 K/UL (ref 0–0.5)
EOSINOPHIL # BLD AUTO: 0.5 K/UL (ref 0–0.5)
EOSINOPHIL # BLD AUTO: 0.6 K/UL (ref 0–0.5)
EOSINOPHIL NFR BLD: 0 % (ref 0–8)
EOSINOPHIL NFR BLD: 0 % (ref 0–8)
EOSINOPHIL NFR BLD: 0.1 % (ref 0–8)
EOSINOPHIL NFR BLD: 0.1 % (ref 0–8)
EOSINOPHIL NFR BLD: 0.2 % (ref 0–8)
EOSINOPHIL NFR BLD: 0.4 % (ref 0–8)
EOSINOPHIL NFR BLD: 0.6 % (ref 0–8)
EOSINOPHIL NFR BLD: 0.7 % (ref 0–8)
EOSINOPHIL NFR BLD: 1.3 % (ref 0–8)
EOSINOPHIL NFR BLD: 1.4 % (ref 0–8)
EOSINOPHIL NFR BLD: 1.5 % (ref 0–8)
EOSINOPHIL NFR BLD: 1.6 % (ref 0–8)
EOSINOPHIL NFR BLD: 1.6 % (ref 0–8)
EOSINOPHIL NFR BLD: 1.7 % (ref 0–8)
EOSINOPHIL NFR BLD: 1.9 % (ref 0–8)
EOSINOPHIL NFR BLD: 2 % (ref 0–8)
EOSINOPHIL NFR BLD: 2.1 % (ref 0–8)
EOSINOPHIL NFR BLD: 2.3 % (ref 0–8)
EOSINOPHIL NFR BLD: 2.3 % (ref 0–8)
EOSINOPHIL NFR BLD: 2.5 % (ref 0–8)
EOSINOPHIL NFR BLD: 2.7 % (ref 0–8)
EOSINOPHIL NFR BLD: 2.9 % (ref 0–8)
EOSINOPHIL NFR BLD: 2.9 % (ref 0–8)
EOSINOPHIL NFR BLD: 3.8 % (ref 0–8)
EOSINOPHIL NFR BLD: 3.9 % (ref 0–8)
EOSINOPHIL NFR BLD: 4.7 % (ref 0–8)
EOSINOPHIL NFR BLD: 4.7 % (ref 0–8)
ERYTHROCYTE [DISTWIDTH] IN BLOOD BY AUTOMATED COUNT: 13 % (ref 11.5–14.5)
ERYTHROCYTE [DISTWIDTH] IN BLOOD BY AUTOMATED COUNT: 13.1 % (ref 11.5–14.5)
ERYTHROCYTE [DISTWIDTH] IN BLOOD BY AUTOMATED COUNT: 13.1 % (ref 11.5–14.5)
ERYTHROCYTE [DISTWIDTH] IN BLOOD BY AUTOMATED COUNT: 13.2 % (ref 11.5–14.5)
ERYTHROCYTE [DISTWIDTH] IN BLOOD BY AUTOMATED COUNT: 13.4 % (ref 11.5–14.5)
ERYTHROCYTE [DISTWIDTH] IN BLOOD BY AUTOMATED COUNT: 13.4 % (ref 11.5–14.5)
ERYTHROCYTE [DISTWIDTH] IN BLOOD BY AUTOMATED COUNT: 13.6 % (ref 11.5–14.5)
ERYTHROCYTE [DISTWIDTH] IN BLOOD BY AUTOMATED COUNT: 13.6 % (ref 11.5–14.5)
ERYTHROCYTE [DISTWIDTH] IN BLOOD BY AUTOMATED COUNT: 13.8 % (ref 11.5–14.5)
ERYTHROCYTE [DISTWIDTH] IN BLOOD BY AUTOMATED COUNT: 13.8 % (ref 11.5–14.5)
ERYTHROCYTE [DISTWIDTH] IN BLOOD BY AUTOMATED COUNT: 13.9 % (ref 11.5–14.5)
ERYTHROCYTE [DISTWIDTH] IN BLOOD BY AUTOMATED COUNT: 14.3 % (ref 11.5–14.5)
ERYTHROCYTE [DISTWIDTH] IN BLOOD BY AUTOMATED COUNT: 14.6 % (ref 11.5–14.5)
ERYTHROCYTE [DISTWIDTH] IN BLOOD BY AUTOMATED COUNT: 15.3 % (ref 11.5–14.5)
ERYTHROCYTE [DISTWIDTH] IN BLOOD BY AUTOMATED COUNT: 17.1 % (ref 11.5–14.5)
ERYTHROCYTE [DISTWIDTH] IN BLOOD BY AUTOMATED COUNT: 17.2 % (ref 11.5–14.5)
ERYTHROCYTE [DISTWIDTH] IN BLOOD BY AUTOMATED COUNT: 17.3 % (ref 11.5–14.5)
ERYTHROCYTE [DISTWIDTH] IN BLOOD BY AUTOMATED COUNT: 17.5 % (ref 11.5–14.5)
ERYTHROCYTE [DISTWIDTH] IN BLOOD BY AUTOMATED COUNT: 17.6 % (ref 11.5–14.5)
ERYTHROCYTE [DISTWIDTH] IN BLOOD BY AUTOMATED COUNT: 17.6 % (ref 11.5–14.5)
ERYTHROCYTE [DISTWIDTH] IN BLOOD BY AUTOMATED COUNT: 17.7 % (ref 11.5–14.5)
ERYTHROCYTE [DISTWIDTH] IN BLOOD BY AUTOMATED COUNT: 17.8 % (ref 11.5–14.5)
ERYTHROCYTE [DISTWIDTH] IN BLOOD BY AUTOMATED COUNT: 18 % (ref 11.5–14.5)
ERYTHROCYTE [DISTWIDTH] IN BLOOD BY AUTOMATED COUNT: 18.1 % (ref 11.5–14.5)
ERYTHROCYTE [DISTWIDTH] IN BLOOD BY AUTOMATED COUNT: 18.1 % (ref 11.5–14.5)
ERYTHROCYTE [DISTWIDTH] IN BLOOD BY AUTOMATED COUNT: 18.5 % (ref 11.5–14.5)
ERYTHROCYTE [DISTWIDTH] IN BLOOD BY AUTOMATED COUNT: 18.7 % (ref 11.5–14.5)
ERYTHROCYTE [SEDIMENTATION RATE] IN BLOOD BY PHOTOMETRIC METHOD: 49 MM/HR (ref 0–23)
EST. GFR  (AFRICAN AMERICAN): 36.9 ML/MIN/1.73 M^2
EST. GFR  (AFRICAN AMERICAN): 36.9 ML/MIN/1.73 M^2
EST. GFR  (AFRICAN AMERICAN): 39 ML/MIN/1.73 M^2
EST. GFR  (AFRICAN AMERICAN): 42 ML/MIN/1.73 M^2
EST. GFR  (AFRICAN AMERICAN): 42 ML/MIN/1.73 M^2
EST. GFR  (AFRICAN AMERICAN): 45 ML/MIN/1.73 M^2
EST. GFR  (AFRICAN AMERICAN): 48 ML/MIN/1.73 M^2
EST. GFR  (AFRICAN AMERICAN): 48 ML/MIN/1.73 M^2
EST. GFR  (AFRICAN AMERICAN): 51.9 ML/MIN/1.73 M^2
EST. GFR  (AFRICAN AMERICAN): 52 ML/MIN/1.73 M^2
EST. GFR  (AFRICAN AMERICAN): 56 ML/MIN/1.73 M^2
EST. GFR  (NO RACE VARIABLE): 25 ML/MIN/1.73 M^2
EST. GFR  (NO RACE VARIABLE): 26 ML/MIN/1.73 M^2
EST. GFR  (NO RACE VARIABLE): 27 ML/MIN/1.73 M^2
EST. GFR  (NO RACE VARIABLE): 29 ML/MIN/1.73 M^2
EST. GFR  (NO RACE VARIABLE): 36.3 ML/MIN/1.73 M^2
EST. GFR  (NO RACE VARIABLE): 38.8 ML/MIN/1.73 M^2
EST. GFR  (NO RACE VARIABLE): 38.8 ML/MIN/1.73 M^2
EST. GFR  (NO RACE VARIABLE): 41.5 ML/MIN/1.73 M^2
EST. GFR  (NO RACE VARIABLE): 42 ML/MIN/1.73 M^2
EST. GFR  (NO RACE VARIABLE): 52.4 ML/MIN/1.73 M^2
EST. GFR  (NO RACE VARIABLE): 57.3 ML/MIN/1.73 M^2
EST. GFR  (NO RACE VARIABLE): 57.3 ML/MIN/1.73 M^2
EST. GFR  (NON AFRICAN AMERICAN): 31.9 ML/MIN/1.73 M^2
EST. GFR  (NON AFRICAN AMERICAN): 31.9 ML/MIN/1.73 M^2
EST. GFR  (NON AFRICAN AMERICAN): 34 ML/MIN/1.73 M^2
EST. GFR  (NON AFRICAN AMERICAN): 36 ML/MIN/1.73 M^2
EST. GFR  (NON AFRICAN AMERICAN): 36 ML/MIN/1.73 M^2
EST. GFR  (NON AFRICAN AMERICAN): 39 ML/MIN/1.73 M^2
EST. GFR  (NON AFRICAN AMERICAN): 41.5 ML/MIN/1.73 M^2
EST. GFR  (NON AFRICAN AMERICAN): 42 ML/MIN/1.73 M^2
EST. GFR  (NON AFRICAN AMERICAN): 44.9 ML/MIN/1.73 M^2
EST. GFR  (NON AFRICAN AMERICAN): 45 ML/MIN/1.73 M^2
EST. GFR  (NON AFRICAN AMERICAN): 49 ML/MIN/1.73 M^2
FACT X ACT/NOR PPP: 80 % (ref 75–196)
FACT X PPP CHRO-ACNC: 0.85 IU/ML (ref 0.3–0.7)
FACT X PPP CHRO-ACNC: 1.06 IU/ML (ref 0.3–0.7)
FACT X PPP CHRO-ACNC: >1.5 IU/ML (ref 0.3–0.7)
FERRITIN SERPL-MCNC: 510 NG/ML (ref 20–300)
FINAL PATHOLOGIC DIAGNOSIS: ABNORMAL
FINAL PATHOLOGIC DIAGNOSIS: ABNORMAL
FINAL PATHOLOGIC DIAGNOSIS: NORMAL
FINAL PATHOLOGIC DIAGNOSIS: NORMAL
FLOW CYTOMETRY ANTIBODIES ANALYZED - LYMPH NODE: NORMAL
FLOW CYTOMETRY COMMENT - LYMPH NODE: NORMAL
FLOW CYTOMETRY INTERPRETATION - LYMPH NODE: NORMAL
FOLATE SERPL-MCNC: 8.8 NG/ML (ref 4–24)
FRACTIONAL SHORTENING: 33 % (ref 28–44)
FRACTIONAL SHORTENING: 38 % (ref 28–44)
GLUCOSE SERPL-MCNC: 100 MG/DL (ref 70–110)
GLUCOSE SERPL-MCNC: 101 MG/DL (ref 70–110)
GLUCOSE SERPL-MCNC: 102 MG/DL (ref 70–110)
GLUCOSE SERPL-MCNC: 103 MG/DL (ref 70–110)
GLUCOSE SERPL-MCNC: 104 MG/DL (ref 70–110)
GLUCOSE SERPL-MCNC: 104 MG/DL (ref 70–110)
GLUCOSE SERPL-MCNC: 112 MG/DL (ref 70–110)
GLUCOSE SERPL-MCNC: 115 MG/DL (ref 70–110)
GLUCOSE SERPL-MCNC: 116 MG/DL (ref 70–110)
GLUCOSE SERPL-MCNC: 125 MG/DL (ref 70–110)
GLUCOSE SERPL-MCNC: 131 MG/DL (ref 70–110)
GLUCOSE SERPL-MCNC: 132 MG/DL (ref 70–110)
GLUCOSE SERPL-MCNC: 81 MG/DL (ref 70–110)
GLUCOSE SERPL-MCNC: 91 MG/DL (ref 70–110)
GLUCOSE SERPL-MCNC: 91 MG/DL (ref 70–110)
GLUCOSE SERPL-MCNC: 93 MG/DL (ref 70–110)
GLUCOSE SERPL-MCNC: 93 MG/DL (ref 70–110)
GLUCOSE SERPL-MCNC: 94 MG/DL (ref 70–110)
GLUCOSE SERPL-MCNC: 94 MG/DL (ref 70–110)
GLUCOSE SERPL-MCNC: 95 MG/DL (ref 70–110)
GLUCOSE SERPL-MCNC: 97 MG/DL (ref 70–110)
GLUCOSE SERPL-MCNC: 98 MG/DL (ref 70–110)
GLUCOSE SERPL-MCNC: 99 MG/DL (ref 70–110)
GLUCOSE UR QL STRIP: ABNORMAL
GRAN CASTS #/AREA URNS LPF: 13 /LPF
GROSS: ABNORMAL
GROSS: NORMAL
GROSS: NORMAL
HCT VFR BLD AUTO: 17.1 % (ref 40–54)
HCT VFR BLD AUTO: 20.1 % (ref 40–54)
HCT VFR BLD AUTO: 20.5 % (ref 40–54)
HCT VFR BLD AUTO: 20.5 % (ref 40–54)
HCT VFR BLD AUTO: 20.9 % (ref 40–54)
HCT VFR BLD AUTO: 21 % (ref 40–54)
HCT VFR BLD AUTO: 21.4 % (ref 40–54)
HCT VFR BLD AUTO: 21.4 % (ref 40–54)
HCT VFR BLD AUTO: 22 % (ref 40–54)
HCT VFR BLD AUTO: 22.2 % (ref 40–54)
HCT VFR BLD AUTO: 22.6 % (ref 40–54)
HCT VFR BLD AUTO: 22.7 % (ref 40–54)
HCT VFR BLD AUTO: 22.8 % (ref 40–54)
HCT VFR BLD AUTO: 23 % (ref 40–54)
HCT VFR BLD AUTO: 23.2 % (ref 40–54)
HCT VFR BLD AUTO: 23.3 % (ref 40–54)
HCT VFR BLD AUTO: 23.7 % (ref 40–54)
HCT VFR BLD AUTO: 25.6 % (ref 40–54)
HCT VFR BLD AUTO: 26 % (ref 40–54)
HCT VFR BLD AUTO: 26.4 % (ref 40–54)
HCT VFR BLD AUTO: 27.9 % (ref 40–54)
HCT VFR BLD AUTO: 28.6 % (ref 40–54)
HCT VFR BLD AUTO: 29.9 % (ref 40–54)
HCT VFR BLD AUTO: 30.8 % (ref 40–54)
HCT VFR BLD AUTO: 31 % (ref 40–54)
HCT VFR BLD AUTO: 31.2 % (ref 40–54)
HCT VFR BLD AUTO: 32.3 % (ref 40–54)
HCT VFR BLD AUTO: 32.4 % (ref 40–54)
HCT VFR BLD AUTO: 32.6 % (ref 40–54)
HCT VFR BLD AUTO: 32.7 % (ref 40–54)
HCT VFR BLD AUTO: 32.9 % (ref 40–54)
HCT VFR BLD AUTO: 33.4 % (ref 40–54)
HCT VFR BLD AUTO: 33.5 % (ref 40–54)
HCT VFR BLD AUTO: 33.7 % (ref 40–54)
HCT VFR BLD AUTO: 34 % (ref 40–54)
HCT VFR BLD AUTO: 34.1 % (ref 40–54)
HCT VFR BLD AUTO: 34.6 % (ref 40–54)
HDLC SERPL-MCNC: 32 MG/DL (ref 40–75)
HDLC SERPL: 20.6 % (ref 20–50)
HGB BLD-MCNC: 10 G/DL (ref 14–18)
HGB BLD-MCNC: 10.2 G/DL (ref 14–18)
HGB BLD-MCNC: 10.2 G/DL (ref 14–18)
HGB BLD-MCNC: 10.3 G/DL (ref 14–18)
HGB BLD-MCNC: 10.6 G/DL (ref 14–18)
HGB BLD-MCNC: 10.7 G/DL (ref 14–18)
HGB BLD-MCNC: 10.8 G/DL (ref 14–18)
HGB BLD-MCNC: 11 G/DL (ref 14–18)
HGB BLD-MCNC: 11.1 G/DL (ref 14–18)
HGB BLD-MCNC: 11.2 G/DL (ref 14–18)
HGB BLD-MCNC: 11.3 G/DL (ref 14–18)
HGB BLD-MCNC: 11.4 G/DL (ref 14–18)
HGB BLD-MCNC: 6 G/DL (ref 14–18)
HGB BLD-MCNC: 6.8 G/DL (ref 14–18)
HGB BLD-MCNC: 6.9 G/DL (ref 14–18)
HGB BLD-MCNC: 6.9 G/DL (ref 14–18)
HGB BLD-MCNC: 7 G/DL (ref 14–18)
HGB BLD-MCNC: 7.2 G/DL (ref 14–18)
HGB BLD-MCNC: 7.3 G/DL (ref 14–18)
HGB BLD-MCNC: 7.5 G/DL (ref 14–18)
HGB BLD-MCNC: 7.5 G/DL (ref 14–18)
HGB BLD-MCNC: 7.6 G/DL (ref 14–18)
HGB BLD-MCNC: 7.7 G/DL (ref 14–18)
HGB BLD-MCNC: 7.7 G/DL (ref 14–18)
HGB BLD-MCNC: 7.8 G/DL (ref 14–18)
HGB BLD-MCNC: 7.9 G/DL (ref 14–18)
HGB BLD-MCNC: 8.1 G/DL (ref 14–18)
HGB BLD-MCNC: 8.8 G/DL (ref 14–18)
HGB BLD-MCNC: 8.8 G/DL (ref 14–18)
HGB BLD-MCNC: 9.2 G/DL (ref 14–18)
HGB BLD-MCNC: 9.8 G/DL (ref 14–18)
HGB UR QL STRIP: ABNORMAL
HYALINE CASTS #/AREA URNS LPF: 27 /LPF
IMM GRANULOCYTES # BLD AUTO: 0.01 K/UL (ref 0–0.04)
IMM GRANULOCYTES # BLD AUTO: 0.02 K/UL (ref 0–0.04)
IMM GRANULOCYTES # BLD AUTO: 0.03 K/UL (ref 0–0.04)
IMM GRANULOCYTES # BLD AUTO: 0.04 K/UL (ref 0–0.04)
IMM GRANULOCYTES # BLD AUTO: 0.05 K/UL (ref 0–0.04)
IMM GRANULOCYTES # BLD AUTO: 0.05 K/UL (ref 0–0.04)
IMM GRANULOCYTES # BLD AUTO: 0.06 K/UL (ref 0–0.04)
IMM GRANULOCYTES # BLD AUTO: 0.06 K/UL (ref 0–0.04)
IMM GRANULOCYTES NFR BLD AUTO: 0.1 % (ref 0–0.5)
IMM GRANULOCYTES NFR BLD AUTO: 0.2 % (ref 0–0.5)
IMM GRANULOCYTES NFR BLD AUTO: 0.3 % (ref 0–0.5)
IMM GRANULOCYTES NFR BLD AUTO: 0.4 % (ref 0–0.5)
IMM GRANULOCYTES NFR BLD AUTO: 0.5 % (ref 0–0.5)
IMM GRANULOCYTES NFR BLD AUTO: 0.6 % (ref 0–0.5)
IMM GRANULOCYTES NFR BLD AUTO: 0.6 % (ref 0–0.5)
INR PPP: 1 (ref 0.8–1.2)
INR PPP: 1.1 (ref 0.8–1.2)
INR PPP: 1.3 (ref 0.8–1.2)
INTERVENTRICULAR SEPTUM: 1.52 CM (ref 0.6–1.1)
INTERVENTRICULAR SEPTUM: 1.62 CM (ref 0.6–1.1)
IRON SERPL-MCNC: 107 UG/DL (ref 45–160)
IRON SERPL-MCNC: 86 UG/DL (ref 45–160)
KETONES UR QL STRIP: NEGATIVE
LA MAJOR: 5.46 CM
LA MINOR: 5.45 CM
LA MINOR: 5.81 CM
LA WIDTH: 4.29 CM
LA WIDTH: 4.63 CM
LACTATE SERPL-SCNC: 2.1 MMOL/L (ref 0.5–2.2)
LACTATE SERPL-SCNC: 2.8 MMOL/L (ref 0.5–2.2)
LACTATE SERPL-SCNC: 4.2 MMOL/L (ref 0.5–2.2)
LDLC SERPL CALC-MCNC: 96 MG/DL (ref 63–159)
LEFT ATRIUM SIZE: 4.18 CM
LEFT ATRIUM SIZE: 4.85 CM
LEFT ATRIUM VOLUME: 99.56 CM3
LEFT INTERNAL DIMENSION IN SYSTOLE: 3.02 CM (ref 2.1–4)
LEFT INTERNAL DIMENSION IN SYSTOLE: 3.06 CM (ref 2.1–4)
LEFT VENTRICLE DIASTOLIC VOLUME INDEX: 35.37 ML/M2
LEFT VENTRICLE DIASTOLIC VOLUME: 112.76 ML
LEFT VENTRICLE DIASTOLIC VOLUME: 95.5 ML
LEFT VENTRICLE MASS INDEX: 104 G/M2
LEFT VENTRICLE SYSTOLIC VOLUME INDEX: 13.6 ML/M2
LEFT VENTRICLE SYSTOLIC VOLUME: 35.47 ML
LEFT VENTRICLE SYSTOLIC VOLUME: 36.67 ML
LEFT VENTRICULAR INTERNAL DIMENSION IN DIASTOLE: 4.56 CM (ref 3.5–6)
LEFT VENTRICULAR INTERNAL DIMENSION IN DIASTOLE: 4.9 CM (ref 3.5–6)
LEFT VENTRICULAR MASS: 281.2 G
LEFT VENTRICULAR MASS: 323.93 G
LEUKOCYTE ESTERASE UR QL STRIP: ABNORMAL
LV LATERAL E/E' RATIO: 8 M/S
LV SEPTAL E/E' RATIO: 10.67 M/S
LYMPHOCYTES # BLD AUTO: 0.2 K/UL (ref 1–4.8)
LYMPHOCYTES # BLD AUTO: 0.4 K/UL (ref 1–4.8)
LYMPHOCYTES # BLD AUTO: 0.5 K/UL (ref 1–4.8)
LYMPHOCYTES # BLD AUTO: 0.5 K/UL (ref 1–4.8)
LYMPHOCYTES # BLD AUTO: 0.6 K/UL (ref 1–4.8)
LYMPHOCYTES # BLD AUTO: 0.7 K/UL (ref 1–4.8)
LYMPHOCYTES # BLD AUTO: 0.8 K/UL (ref 1–4.8)
LYMPHOCYTES # BLD AUTO: 0.8 K/UL (ref 1–4.8)
LYMPHOCYTES # BLD AUTO: 0.9 K/UL (ref 1–4.8)
LYMPHOCYTES # BLD AUTO: 1 K/UL (ref 1–4.8)
LYMPHOCYTES # BLD AUTO: 1.1 K/UL (ref 1–4.8)
LYMPHOCYTES # BLD AUTO: 1.2 K/UL (ref 1–4.8)
LYMPHOCYTES # BLD AUTO: 1.2 K/UL (ref 1–4.8)
LYMPHOCYTES # BLD AUTO: 1.3 K/UL (ref 1–4.8)
LYMPHOCYTES # BLD AUTO: 1.9 K/UL (ref 1–4.8)
LYMPHOCYTES # BLD AUTO: 2 K/UL (ref 1–4.8)
LYMPHOCYTES # BLD AUTO: 2.2 K/UL (ref 1–4.8)
LYMPHOCYTES # BLD AUTO: 2.4 K/UL (ref 1–4.8)
LYMPHOCYTES # BLD AUTO: 2.4 K/UL (ref 1–4.8)
LYMPHOCYTES # BLD AUTO: 2.5 K/UL (ref 1–4.8)
LYMPHOCYTES # BLD AUTO: 2.5 K/UL (ref 1–4.8)
LYMPHOCYTES # BLD AUTO: 2.6 K/UL (ref 1–4.8)
LYMPHOCYTES # BLD AUTO: 2.7 K/UL (ref 1–4.8)
LYMPHOCYTES NFR BLD: 10.2 % (ref 18–48)
LYMPHOCYTES NFR BLD: 10.2 % (ref 18–48)
LYMPHOCYTES NFR BLD: 11.1 % (ref 18–48)
LYMPHOCYTES NFR BLD: 11.4 % (ref 18–48)
LYMPHOCYTES NFR BLD: 11.7 % (ref 18–48)
LYMPHOCYTES NFR BLD: 12.2 % (ref 18–48)
LYMPHOCYTES NFR BLD: 12.8 % (ref 18–48)
LYMPHOCYTES NFR BLD: 12.9 % (ref 18–48)
LYMPHOCYTES NFR BLD: 13 % (ref 18–48)
LYMPHOCYTES NFR BLD: 13.3 % (ref 18–48)
LYMPHOCYTES NFR BLD: 13.9 % (ref 18–48)
LYMPHOCYTES NFR BLD: 14.4 % (ref 18–48)
LYMPHOCYTES NFR BLD: 15.8 % (ref 18–48)
LYMPHOCYTES NFR BLD: 18.8 % (ref 18–48)
LYMPHOCYTES NFR BLD: 20 % (ref 18–48)
LYMPHOCYTES NFR BLD: 20.4 % (ref 18–48)
LYMPHOCYTES NFR BLD: 21.3 % (ref 18–48)
LYMPHOCYTES NFR BLD: 21.4 % (ref 18–48)
LYMPHOCYTES NFR BLD: 21.8 % (ref 18–48)
LYMPHOCYTES NFR BLD: 22.3 % (ref 18–48)
LYMPHOCYTES NFR BLD: 22.8 % (ref 18–48)
LYMPHOCYTES NFR BLD: 4.9 % (ref 18–48)
LYMPHOCYTES NFR BLD: 5.7 % (ref 18–48)
LYMPHOCYTES NFR BLD: 6.6 % (ref 18–48)
LYMPHOCYTES NFR BLD: 7.2 % (ref 18–48)
LYMPHOCYTES NFR BLD: 7.3 % (ref 18–48)
LYMPHOCYTES NFR BLD: 7.8 % (ref 18–48)
LYMPHOCYTES NFR BLD: 8.3 % (ref 18–48)
LYMPHOCYTES NFR BLD: 8.6 % (ref 18–48)
LYMPHOCYTES NFR BLD: 9.1 % (ref 18–48)
LYMPHOCYTES NFR BLD: 9.1 % (ref 18–48)
LYMPHOCYTES NFR BLD: 9.3 % (ref 18–48)
LYMPHOCYTES NFR BLD: 9.3 % (ref 18–48)
LYMPHOCYTES NFR BLD: 9.5 % (ref 18–48)
LYMPHOCYTES NFR BLD: 9.6 % (ref 18–48)
LYMPHOCYTES NFR BLD: 9.8 % (ref 18–48)
LYMPHOCYTES NFR BLD: 9.9 % (ref 18–48)
Lab: ABNORMAL
Lab: ABNORMAL
Lab: NORMAL
Lab: NORMAL
MAGNESIUM SERPL-MCNC: 1.5 MG/DL (ref 1.6–2.6)
MAGNESIUM SERPL-MCNC: 1.5 MG/DL (ref 1.6–2.6)
MAGNESIUM SERPL-MCNC: 1.6 MG/DL (ref 1.6–2.6)
MAGNESIUM SERPL-MCNC: 1.7 MG/DL (ref 1.6–2.6)
MAGNESIUM SERPL-MCNC: 1.8 MG/DL (ref 1.6–2.6)
MAGNESIUM SERPL-MCNC: 1.9 MG/DL (ref 1.6–2.6)
MAGNESIUM SERPL-MCNC: 2 MG/DL (ref 1.6–2.6)
MAGNESIUM SERPL-MCNC: 2.1 MG/DL (ref 1.6–2.6)
MCH RBC QN AUTO: 33.6 PG (ref 27–31)
MCH RBC QN AUTO: 34 PG (ref 27–31)
MCH RBC QN AUTO: 34 PG (ref 27–31)
MCH RBC QN AUTO: 34.1 PG (ref 27–31)
MCH RBC QN AUTO: 34.2 PG (ref 27–31)
MCH RBC QN AUTO: 34.3 PG (ref 27–31)
MCH RBC QN AUTO: 34.4 PG (ref 27–31)
MCH RBC QN AUTO: 34.5 PG (ref 27–31)
MCH RBC QN AUTO: 34.6 PG (ref 27–31)
MCH RBC QN AUTO: 34.7 PG (ref 27–31)
MCH RBC QN AUTO: 34.7 PG (ref 27–31)
MCH RBC QN AUTO: 34.8 PG (ref 27–31)
MCH RBC QN AUTO: 34.9 PG (ref 27–31)
MCH RBC QN AUTO: 35 PG (ref 27–31)
MCH RBC QN AUTO: 35.1 PG (ref 27–31)
MCH RBC QN AUTO: 35.2 PG (ref 27–31)
MCH RBC QN AUTO: 35.3 PG (ref 27–31)
MCH RBC QN AUTO: 35.4 PG (ref 27–31)
MCH RBC QN AUTO: 35.5 PG (ref 27–31)
MCH RBC QN AUTO: 35.6 PG (ref 27–31)
MCH RBC QN AUTO: 35.8 PG (ref 27–31)
MCH RBC QN AUTO: 35.8 PG (ref 27–31)
MCH RBC QN AUTO: 35.9 PG (ref 27–31)
MCH RBC QN AUTO: 36 PG (ref 27–31)
MCH RBC QN AUTO: 36 PG (ref 27–31)
MCH RBC QN AUTO: 36.2 PG (ref 27–31)
MCH RBC QN AUTO: 36.3 PG (ref 27–31)
MCH RBC QN AUTO: 36.4 PG (ref 27–31)
MCHC RBC AUTO-ENTMCNC: 31.7 G/DL (ref 32–36)
MCHC RBC AUTO-ENTMCNC: 31.8 G/DL (ref 32–36)
MCHC RBC AUTO-ENTMCNC: 32.4 G/DL (ref 32–36)
MCHC RBC AUTO-ENTMCNC: 32.4 G/DL (ref 32–36)
MCHC RBC AUTO-ENTMCNC: 32.7 G/DL (ref 32–36)
MCHC RBC AUTO-ENTMCNC: 32.7 G/DL (ref 32–36)
MCHC RBC AUTO-ENTMCNC: 32.8 G/DL (ref 32–36)
MCHC RBC AUTO-ENTMCNC: 32.9 G/DL (ref 32–36)
MCHC RBC AUTO-ENTMCNC: 33 G/DL (ref 32–36)
MCHC RBC AUTO-ENTMCNC: 33.1 G/DL (ref 32–36)
MCHC RBC AUTO-ENTMCNC: 33.2 G/DL (ref 32–36)
MCHC RBC AUTO-ENTMCNC: 33.3 G/DL (ref 32–36)
MCHC RBC AUTO-ENTMCNC: 33.4 G/DL (ref 32–36)
MCHC RBC AUTO-ENTMCNC: 33.5 G/DL (ref 32–36)
MCHC RBC AUTO-ENTMCNC: 33.6 G/DL (ref 32–36)
MCHC RBC AUTO-ENTMCNC: 33.6 G/DL (ref 32–36)
MCHC RBC AUTO-ENTMCNC: 33.7 G/DL (ref 32–36)
MCHC RBC AUTO-ENTMCNC: 33.7 G/DL (ref 32–36)
MCHC RBC AUTO-ENTMCNC: 33.8 G/DL (ref 32–36)
MCHC RBC AUTO-ENTMCNC: 33.8 G/DL (ref 32–36)
MCHC RBC AUTO-ENTMCNC: 34 G/DL (ref 32–36)
MCHC RBC AUTO-ENTMCNC: 34.1 G/DL (ref 32–36)
MCHC RBC AUTO-ENTMCNC: 34.3 G/DL (ref 32–36)
MCHC RBC AUTO-ENTMCNC: 34.4 G/DL (ref 32–36)
MCHC RBC AUTO-ENTMCNC: 34.4 G/DL (ref 32–36)
MCHC RBC AUTO-ENTMCNC: 34.6 G/DL (ref 32–36)
MCHC RBC AUTO-ENTMCNC: 34.7 G/DL (ref 32–36)
MCHC RBC AUTO-ENTMCNC: 34.8 G/DL (ref 32–36)
MCHC RBC AUTO-ENTMCNC: 34.8 G/DL (ref 32–36)
MCHC RBC AUTO-ENTMCNC: 35 G/DL (ref 32–36)
MCHC RBC AUTO-ENTMCNC: 35.1 G/DL (ref 32–36)
MCHC RBC AUTO-ENTMCNC: 35.1 G/DL (ref 32–36)
MCV RBC AUTO: 100 FL (ref 82–98)
MCV RBC AUTO: 101 FL (ref 82–98)
MCV RBC AUTO: 102 FL (ref 82–98)
MCV RBC AUTO: 103 FL (ref 82–98)
MCV RBC AUTO: 104 FL (ref 82–98)
MCV RBC AUTO: 105 FL (ref 82–98)
MCV RBC AUTO: 106 FL (ref 82–98)
MCV RBC AUTO: 107 FL (ref 82–98)
MCV RBC AUTO: 108 FL (ref 82–98)
MCV RBC AUTO: 108 FL (ref 82–98)
MCV RBC AUTO: 109 FL (ref 82–98)
MCV RBC AUTO: 110 FL (ref 82–98)
MCV RBC AUTO: 99 FL (ref 82–98)
MICROSCOPIC COMMENT: ABNORMAL
MICROSCOPIC EXAM: NORMAL
MONOCYTES # BLD AUTO: 0.1 K/UL (ref 0.3–1)
MONOCYTES # BLD AUTO: 0.4 K/UL (ref 0.3–1)
MONOCYTES # BLD AUTO: 0.4 K/UL (ref 0.3–1)
MONOCYTES # BLD AUTO: 0.5 K/UL (ref 0.3–1)
MONOCYTES # BLD AUTO: 0.6 K/UL (ref 0.3–1)
MONOCYTES # BLD AUTO: 0.7 K/UL (ref 0.3–1)
MONOCYTES # BLD AUTO: 0.8 K/UL (ref 0.3–1)
MONOCYTES # BLD AUTO: 0.9 K/UL (ref 0.3–1)
MONOCYTES # BLD AUTO: 1 K/UL (ref 0.3–1)
MONOCYTES # BLD AUTO: 1 K/UL (ref 0.3–1)
MONOCYTES # BLD AUTO: 1.2 K/UL (ref 0.3–1)
MONOCYTES NFR BLD: 1.9 % (ref 4–15)
MONOCYTES NFR BLD: 10.2 % (ref 4–15)
MONOCYTES NFR BLD: 10.3 % (ref 4–15)
MONOCYTES NFR BLD: 10.6 % (ref 4–15)
MONOCYTES NFR BLD: 10.7 % (ref 4–15)
MONOCYTES NFR BLD: 10.8 % (ref 4–15)
MONOCYTES NFR BLD: 11 % (ref 4–15)
MONOCYTES NFR BLD: 11.5 % (ref 4–15)
MONOCYTES NFR BLD: 11.6 % (ref 4–15)
MONOCYTES NFR BLD: 12.2 % (ref 4–15)
MONOCYTES NFR BLD: 12.4 % (ref 4–15)
MONOCYTES NFR BLD: 12.8 % (ref 4–15)
MONOCYTES NFR BLD: 3.7 % (ref 4–15)
MONOCYTES NFR BLD: 5.4 % (ref 4–15)
MONOCYTES NFR BLD: 5.9 % (ref 4–15)
MONOCYTES NFR BLD: 6.1 % (ref 4–15)
MONOCYTES NFR BLD: 6.2 % (ref 4–15)
MONOCYTES NFR BLD: 6.4 % (ref 4–15)
MONOCYTES NFR BLD: 6.4 % (ref 4–15)
MONOCYTES NFR BLD: 6.5 % (ref 4–15)
MONOCYTES NFR BLD: 6.7 % (ref 4–15)
MONOCYTES NFR BLD: 7 % (ref 4–15)
MONOCYTES NFR BLD: 7.2 % (ref 4–15)
MONOCYTES NFR BLD: 7.3 % (ref 4–15)
MONOCYTES NFR BLD: 7.4 % (ref 4–15)
MONOCYTES NFR BLD: 8.3 % (ref 4–15)
MONOCYTES NFR BLD: 8.3 % (ref 4–15)
MONOCYTES NFR BLD: 8.4 % (ref 4–15)
MONOCYTES NFR BLD: 8.5 % (ref 4–15)
MONOCYTES NFR BLD: 8.8 % (ref 4–15)
MONOCYTES NFR BLD: 9.2 % (ref 4–15)
MONOCYTES NFR BLD: 9.4 % (ref 4–15)
MONOCYTES NFR BLD: 9.7 % (ref 4–15)
MONOCYTES NFR BLD: 9.9 % (ref 4–15)
MV A" WAVE DURATION": 8.3 MSEC
MV PEAK A VEL: 0.74 M/S
MV PEAK E VEL: 0.64 M/S
MV STENOSIS PRESSURE HALF TIME: 56.27 MS
MV VALVE AREA P 1/2 METHOD: 3.91 CM2
NEUTROPHILS # BLD AUTO: 11.1 K/UL (ref 1.8–7.7)
NEUTROPHILS # BLD AUTO: 11.7 K/UL (ref 1.8–7.7)
NEUTROPHILS # BLD AUTO: 14.4 K/UL (ref 1.8–7.7)
NEUTROPHILS # BLD AUTO: 2.8 K/UL (ref 1.8–7.7)
NEUTROPHILS # BLD AUTO: 3.1 K/UL (ref 1.8–7.7)
NEUTROPHILS # BLD AUTO: 3.4 K/UL (ref 1.8–7.7)
NEUTROPHILS # BLD AUTO: 3.7 K/UL (ref 1.8–7.7)
NEUTROPHILS # BLD AUTO: 4 K/UL (ref 1.8–7.7)
NEUTROPHILS # BLD AUTO: 4.3 K/UL (ref 1.8–7.7)
NEUTROPHILS # BLD AUTO: 4.6 K/UL (ref 1.8–7.7)
NEUTROPHILS # BLD AUTO: 4.6 K/UL (ref 1.8–7.7)
NEUTROPHILS # BLD AUTO: 4.9 K/UL (ref 1.8–7.7)
NEUTROPHILS # BLD AUTO: 5 K/UL (ref 1.8–7.7)
NEUTROPHILS # BLD AUTO: 5.3 K/UL (ref 1.8–7.7)
NEUTROPHILS # BLD AUTO: 5.4 K/UL (ref 1.8–7.7)
NEUTROPHILS # BLD AUTO: 5.5 K/UL (ref 1.8–7.7)
NEUTROPHILS # BLD AUTO: 6 K/UL (ref 1.8–7.7)
NEUTROPHILS # BLD AUTO: 6.1 K/UL (ref 1.8–7.7)
NEUTROPHILS # BLD AUTO: 6.1 K/UL (ref 1.8–7.7)
NEUTROPHILS # BLD AUTO: 6.3 K/UL (ref 1.8–7.7)
NEUTROPHILS # BLD AUTO: 6.8 K/UL (ref 1.8–7.7)
NEUTROPHILS # BLD AUTO: 6.8 K/UL (ref 1.8–7.7)
NEUTROPHILS # BLD AUTO: 6.9 K/UL (ref 1.8–7.7)
NEUTROPHILS # BLD AUTO: 7 K/UL (ref 1.8–7.7)
NEUTROPHILS # BLD AUTO: 7.1 K/UL (ref 1.8–7.7)
NEUTROPHILS # BLD AUTO: 7.4 K/UL (ref 1.8–7.7)
NEUTROPHILS # BLD AUTO: 7.4 K/UL (ref 1.8–7.7)
NEUTROPHILS # BLD AUTO: 7.5 K/UL (ref 1.8–7.7)
NEUTROPHILS # BLD AUTO: 7.6 K/UL (ref 1.8–7.7)
NEUTROPHILS # BLD AUTO: 7.9 K/UL (ref 1.8–7.7)
NEUTROPHILS # BLD AUTO: 8 K/UL (ref 1.8–7.7)
NEUTROPHILS # BLD AUTO: 8.2 K/UL (ref 1.8–7.7)
NEUTROPHILS # BLD AUTO: 8.9 K/UL (ref 1.8–7.7)
NEUTROPHILS # BLD AUTO: 8.9 K/UL (ref 1.8–7.7)
NEUTROPHILS # BLD AUTO: 9.9 K/UL (ref 1.8–7.7)
NEUTROPHILS NFR BLD: 64.9 % (ref 38–73)
NEUTROPHILS NFR BLD: 66.1 % (ref 38–73)
NEUTROPHILS NFR BLD: 66.2 % (ref 38–73)
NEUTROPHILS NFR BLD: 67 % (ref 38–73)
NEUTROPHILS NFR BLD: 68.2 % (ref 38–73)
NEUTROPHILS NFR BLD: 68.7 % (ref 38–73)
NEUTROPHILS NFR BLD: 69.2 % (ref 38–73)
NEUTROPHILS NFR BLD: 69.6 % (ref 38–73)
NEUTROPHILS NFR BLD: 70.4 % (ref 38–73)
NEUTROPHILS NFR BLD: 70.4 % (ref 38–73)
NEUTROPHILS NFR BLD: 71.3 % (ref 38–73)
NEUTROPHILS NFR BLD: 73.2 % (ref 38–73)
NEUTROPHILS NFR BLD: 75.5 % (ref 38–73)
NEUTROPHILS NFR BLD: 76.3 % (ref 38–73)
NEUTROPHILS NFR BLD: 76.7 % (ref 38–73)
NEUTROPHILS NFR BLD: 76.8 % (ref 38–73)
NEUTROPHILS NFR BLD: 77 % (ref 38–73)
NEUTROPHILS NFR BLD: 77.3 % (ref 38–73)
NEUTROPHILS NFR BLD: 77.5 % (ref 38–73)
NEUTROPHILS NFR BLD: 77.7 % (ref 38–73)
NEUTROPHILS NFR BLD: 77.8 % (ref 38–73)
NEUTROPHILS NFR BLD: 77.8 % (ref 38–73)
NEUTROPHILS NFR BLD: 77.9 % (ref 38–73)
NEUTROPHILS NFR BLD: 78 % (ref 38–73)
NEUTROPHILS NFR BLD: 78.1 % (ref 38–73)
NEUTROPHILS NFR BLD: 78.8 % (ref 38–73)
NEUTROPHILS NFR BLD: 79.2 % (ref 38–73)
NEUTROPHILS NFR BLD: 80.1 % (ref 38–73)
NEUTROPHILS NFR BLD: 80.1 % (ref 38–73)
NEUTROPHILS NFR BLD: 80.8 % (ref 38–73)
NEUTROPHILS NFR BLD: 81.2 % (ref 38–73)
NEUTROPHILS NFR BLD: 82.8 % (ref 38–73)
NEUTROPHILS NFR BLD: 82.9 % (ref 38–73)
NEUTROPHILS NFR BLD: 83 % (ref 38–73)
NEUTROPHILS NFR BLD: 84.9 % (ref 38–73)
NEUTROPHILS NFR BLD: 89.2 % (ref 38–73)
NEUTROPHILS NFR BLD: 92.6 % (ref 38–73)
NITRITE UR QL STRIP: NEGATIVE
NONHDLC SERPL-MCNC: 123 MG/DL
NRBC BLD-RTO: 0 /100 WBC
NUM UNITS TRANS PACKED RBC: NORMAL
OB PNL STL: NEGATIVE
PH UR STRIP: 7 [PH] (ref 5–8)
PHOSPHATE SERPL-MCNC: 2.4 MG/DL (ref 2.7–4.5)
PHOSPHATE SERPL-MCNC: 2.4 MG/DL (ref 2.7–4.5)
PHOSPHATE SERPL-MCNC: 2.9 MG/DL (ref 2.7–4.5)
PHOSPHATE SERPL-MCNC: 3 MG/DL (ref 2.7–4.5)
PHOSPHATE SERPL-MCNC: 3.1 MG/DL (ref 2.7–4.5)
PHOSPHATE SERPL-MCNC: 3.2 MG/DL (ref 2.7–4.5)
PHOSPHATE SERPL-MCNC: 3.4 MG/DL (ref 2.7–4.5)
PHOSPHATE SERPL-MCNC: 3.6 MG/DL (ref 2.7–4.5)
PISA TR MAX VEL: 1.8 M/S
PISA TR MAX VEL: 2.2 M/S
PLATELET # BLD AUTO: 104 K/UL (ref 150–450)
PLATELET # BLD AUTO: 104 K/UL (ref 150–450)
PLATELET # BLD AUTO: 116 K/UL (ref 150–450)
PLATELET # BLD AUTO: 123 K/UL (ref 150–450)
PLATELET # BLD AUTO: 125 K/UL (ref 150–450)
PLATELET # BLD AUTO: 132 K/UL (ref 150–450)
PLATELET # BLD AUTO: 133 K/UL (ref 150–450)
PLATELET # BLD AUTO: 136 K/UL (ref 150–450)
PLATELET # BLD AUTO: 139 K/UL (ref 150–450)
PLATELET # BLD AUTO: 141 K/UL (ref 150–450)
PLATELET # BLD AUTO: 142 K/UL (ref 150–450)
PLATELET # BLD AUTO: 142 K/UL (ref 150–450)
PLATELET # BLD AUTO: 144 K/UL (ref 150–450)
PLATELET # BLD AUTO: 145 K/UL (ref 150–450)
PLATELET # BLD AUTO: 146 K/UL (ref 150–450)
PLATELET # BLD AUTO: 148 K/UL (ref 150–450)
PLATELET # BLD AUTO: 156 K/UL (ref 150–450)
PLATELET # BLD AUTO: 160 K/UL (ref 150–450)
PLATELET # BLD AUTO: 161 K/UL (ref 150–450)
PLATELET # BLD AUTO: 174 K/UL (ref 150–450)
PLATELET # BLD AUTO: 188 K/UL (ref 150–450)
PLATELET # BLD AUTO: 189 K/UL (ref 150–450)
PLATELET # BLD AUTO: 203 K/UL (ref 150–450)
PLATELET # BLD AUTO: 207 K/UL (ref 150–450)
PLATELET # BLD AUTO: 212 K/UL (ref 150–450)
PLATELET # BLD AUTO: 220 K/UL (ref 150–450)
PLATELET # BLD AUTO: 221 K/UL (ref 150–450)
PLATELET # BLD AUTO: 224 K/UL (ref 150–450)
PLATELET # BLD AUTO: 236 K/UL (ref 150–450)
PLATELET # BLD AUTO: 248 K/UL (ref 150–450)
PLATELET # BLD AUTO: 305 K/UL (ref 150–450)
PLATELET # BLD AUTO: 72 K/UL (ref 150–450)
PLATELET # BLD AUTO: 81 K/UL (ref 150–450)
PLATELET # BLD AUTO: 94 K/UL (ref 150–450)
PLATELET # BLD AUTO: 96 K/UL (ref 150–450)
PMV BLD AUTO: 10 FL (ref 9.2–12.9)
PMV BLD AUTO: 10 FL (ref 9.2–12.9)
PMV BLD AUTO: 10.1 FL (ref 9.2–12.9)
PMV BLD AUTO: 10.2 FL (ref 9.2–12.9)
PMV BLD AUTO: 10.3 FL (ref 9.2–12.9)
PMV BLD AUTO: 10.4 FL (ref 9.2–12.9)
PMV BLD AUTO: 10.5 FL (ref 9.2–12.9)
PMV BLD AUTO: 10.6 FL (ref 9.2–12.9)
PMV BLD AUTO: 10.6 FL (ref 9.2–12.9)
PMV BLD AUTO: 10.7 FL (ref 9.2–12.9)
PMV BLD AUTO: 10.9 FL (ref 9.2–12.9)
PMV BLD AUTO: 11.1 FL (ref 9.2–12.9)
PMV BLD AUTO: 11.2 FL (ref 9.2–12.9)
PMV BLD AUTO: 11.2 FL (ref 9.2–12.9)
PMV BLD AUTO: 9.8 FL (ref 9.2–12.9)
PMV BLD AUTO: 9.9 FL (ref 9.2–12.9)
POC MOLECULAR INFLUENZA A AGN: NEGATIVE
POC MOLECULAR INFLUENZA B AGN: NEGATIVE
POCT GLUCOSE: 102 MG/DL (ref 70–110)
POCT GLUCOSE: 105 MG/DL (ref 70–110)
POCT GLUCOSE: 115 MG/DL (ref 70–110)
POCT GLUCOSE: 133 MG/DL (ref 70–110)
POTASSIUM SERPL-SCNC: 3.3 MMOL/L (ref 3.5–5.1)
POTASSIUM SERPL-SCNC: 3.4 MMOL/L (ref 3.5–5.1)
POTASSIUM SERPL-SCNC: 3.4 MMOL/L (ref 3.5–5.1)
POTASSIUM SERPL-SCNC: 3.5 MMOL/L (ref 3.5–5.1)
POTASSIUM SERPL-SCNC: 3.6 MMOL/L (ref 3.5–5.1)
POTASSIUM SERPL-SCNC: 3.7 MMOL/L (ref 3.5–5.1)
POTASSIUM SERPL-SCNC: 3.8 MMOL/L (ref 3.5–5.1)
POTASSIUM SERPL-SCNC: 3.9 MMOL/L (ref 3.5–5.1)
POTASSIUM SERPL-SCNC: 3.9 MMOL/L (ref 3.5–5.1)
POTASSIUM SERPL-SCNC: 4 MMOL/L (ref 3.5–5.1)
POTASSIUM SERPL-SCNC: 4.1 MMOL/L (ref 3.5–5.1)
POTASSIUM SERPL-SCNC: 4.3 MMOL/L (ref 3.5–5.1)
POTASSIUM SERPL-SCNC: 4.4 MMOL/L (ref 3.5–5.1)
POTASSIUM SERPL-SCNC: 4.5 MMOL/L (ref 3.5–5.1)
POTASSIUM SERPL-SCNC: 4.5 MMOL/L (ref 3.5–5.1)
POTASSIUM SERPL-SCNC: 4.6 MMOL/L (ref 3.5–5.1)
POTASSIUM SERPL-SCNC: 4.6 MMOL/L (ref 3.5–5.1)
POTASSIUM SERPL-SCNC: 5.1 MMOL/L (ref 3.5–5.1)
PROT SERPL-MCNC: 5 G/DL (ref 6–8.4)
PROT SERPL-MCNC: 5.6 G/DL (ref 6–8.4)
PROT SERPL-MCNC: 5.7 G/DL (ref 6–8.4)
PROT SERPL-MCNC: 5.8 G/DL (ref 6–8.4)
PROT SERPL-MCNC: 5.9 G/DL (ref 6–8.4)
PROT SERPL-MCNC: 6 G/DL (ref 6–8.4)
PROT SERPL-MCNC: 6 G/DL (ref 6–8.4)
PROT SERPL-MCNC: 6.1 G/DL (ref 6–8.4)
PROT SERPL-MCNC: 6.2 G/DL (ref 6–8.4)
PROT SERPL-MCNC: 6.3 G/DL (ref 6–8.4)
PROT SERPL-MCNC: 6.3 G/DL (ref 6–8.4)
PROT SERPL-MCNC: 6.4 G/DL (ref 6–8.4)
PROT SERPL-MCNC: 6.6 G/DL (ref 6–8.4)
PROT SERPL-MCNC: 6.8 G/DL (ref 6–8.4)
PROT SERPL-MCNC: 6.8 G/DL (ref 6–8.4)
PROT UR QL STRIP: ABNORMAL
PROTHROMBIN TIME: 10.9 SEC (ref 9–12.5)
PROTHROMBIN TIME: 11.4 SEC (ref 9–12.5)
PROTHROMBIN TIME: 12.8 SEC (ref 9–12.5)
PTH-INTACT SERPL-MCNC: 154.7 PG/ML (ref 9–77)
PTH-INTACT SERPL-MCNC: 83.9 PG/ML (ref 9–77)
PULM VEIN S/D RATIO: 1.58
PV PEAK D VEL: 0.45 M/S
PV PEAK S VEL: 0.71 M/S
PV PEAK VELOCITY: 1.07 CM/S
PV PEAK VELOCITY: 1.39 CM/S
RA MAJOR: 4.84 CM
RA PRESSURE: 3 MMHG
RA PRESSURE: 3 MMHG
RA WIDTH: 3.73 CM
RBC # BLD AUTO: 1.73 M/UL (ref 4.6–6.2)
RBC # BLD AUTO: 1.9 M/UL (ref 4.6–6.2)
RBC # BLD AUTO: 1.91 M/UL (ref 4.6–6.2)
RBC # BLD AUTO: 1.92 M/UL (ref 4.6–6.2)
RBC # BLD AUTO: 1.93 M/UL (ref 4.6–6.2)
RBC # BLD AUTO: 2 M/UL (ref 4.6–6.2)
RBC # BLD AUTO: 2.01 M/UL (ref 4.6–6.2)
RBC # BLD AUTO: 2.05 M/UL (ref 4.6–6.2)
RBC # BLD AUTO: 2.06 M/UL (ref 4.6–6.2)
RBC # BLD AUTO: 2.11 M/UL (ref 4.6–6.2)
RBC # BLD AUTO: 2.11 M/UL (ref 4.6–6.2)
RBC # BLD AUTO: 2.12 M/UL (ref 4.6–6.2)
RBC # BLD AUTO: 2.19 M/UL (ref 4.6–6.2)
RBC # BLD AUTO: 2.22 M/UL (ref 4.6–6.2)
RBC # BLD AUTO: 2.23 M/UL (ref 4.6–6.2)
RBC # BLD AUTO: 2.25 M/UL (ref 4.6–6.2)
RBC # BLD AUTO: 2.31 M/UL (ref 4.6–6.2)
RBC # BLD AUTO: 2.46 M/UL (ref 4.6–6.2)
RBC # BLD AUTO: 2.48 M/UL (ref 4.6–6.2)
RBC # BLD AUTO: 2.54 M/UL (ref 4.6–6.2)
RBC # BLD AUTO: 2.81 M/UL (ref 4.6–6.2)
RBC # BLD AUTO: 2.83 M/UL (ref 4.6–6.2)
RBC # BLD AUTO: 2.95 M/UL (ref 4.6–6.2)
RBC # BLD AUTO: 2.96 M/UL (ref 4.6–6.2)
RBC # BLD AUTO: 3.03 M/UL (ref 4.6–6.2)
RBC # BLD AUTO: 3.06 M/UL (ref 4.6–6.2)
RBC # BLD AUTO: 3.09 M/UL (ref 4.6–6.2)
RBC # BLD AUTO: 3.13 M/UL (ref 4.6–6.2)
RBC # BLD AUTO: 3.14 M/UL (ref 4.6–6.2)
RBC # BLD AUTO: 3.15 M/UL (ref 4.6–6.2)
RBC # BLD AUTO: 3.18 M/UL (ref 4.6–6.2)
RBC # BLD AUTO: 3.18 M/UL (ref 4.6–6.2)
RBC # BLD AUTO: 3.2 M/UL (ref 4.6–6.2)
RBC # BLD AUTO: 3.23 M/UL (ref 4.6–6.2)
RBC # BLD AUTO: 3.24 M/UL (ref 4.6–6.2)
RBC # BLD AUTO: 3.28 M/UL (ref 4.6–6.2)
RBC # BLD AUTO: 3.3 M/UL (ref 4.6–6.2)
RBC #/AREA URNS HPF: 6 /HPF (ref 0–4)
RETICS/RBC NFR AUTO: 3.4 % (ref 0.4–2)
RIGHT VENTRICULAR END-DIASTOLIC DIMENSION: 2.7 CM
SARS-COV-2 AG RESP QL IA.RAPID: NEGATIVE
SARS-COV-2 RDRP RESP QL NAA+PROBE: NEGATIVE
SATURATED IRON: 41 % (ref 20–50)
SATURATED IRON: 43 % (ref 20–50)
SINUS: 3.45 CM
SINUS: 3.79 CM
SODIUM SERPL-SCNC: 137 MMOL/L (ref 136–145)
SODIUM SERPL-SCNC: 137 MMOL/L (ref 136–145)
SODIUM SERPL-SCNC: 138 MMOL/L (ref 136–145)
SODIUM SERPL-SCNC: 139 MMOL/L (ref 136–145)
SODIUM SERPL-SCNC: 140 MMOL/L (ref 136–145)
SODIUM SERPL-SCNC: 141 MMOL/L (ref 136–145)
SODIUM SERPL-SCNC: 142 MMOL/L (ref 136–145)
SODIUM SERPL-SCNC: 143 MMOL/L (ref 136–145)
SODIUM SERPL-SCNC: 144 MMOL/L (ref 136–145)
SODIUM SERPL-SCNC: 145 MMOL/L (ref 136–145)
SODIUM SERPL-SCNC: 146 MMOL/L (ref 136–145)
SODIUM SERPL-SCNC: 146 MMOL/L (ref 136–145)
SP GR UR STRIP: 1.02 (ref 1–1.03)
STJ: 2.58 CM
STJ: 2.83 CM
SUPPLEMENTAL DIAGNOSIS: ABNORMAL
SUPPLEMENTAL DIAGNOSIS: NORMAL
TDI LATERAL: 0.08 M/S
TDI SEPTAL: 0.06 M/S
TDI: 0.07 M/S
TOTAL IRON BINDING CAPACITY: 201 UG/DL (ref 250–450)
TOTAL IRON BINDING CAPACITY: 258 UG/DL (ref 250–450)
TOXIC GRANULES BLD QL SMEAR: PRESENT
TR MAX PG: 13 MMHG
TR MAX PG: 19 MMHG
TRANSFERRIN SERPL-MCNC: 136 MG/DL (ref 200–375)
TRANSFERRIN SERPL-MCNC: 174 MG/DL (ref 200–375)
TRICUSPID ANNULAR PLANE SYSTOLIC EXCURSION: 2.26 CM
TRIGL SERPL-MCNC: 135 MG/DL (ref 30–150)
TROPONIN I SERPL DL<=0.01 NG/ML-MCNC: 0.03 NG/ML (ref 0–0.03)
TROPONIN I SERPL DL<=0.01 NG/ML-MCNC: 0.05 NG/ML (ref 0–0.03)
TV REST PULMONARY ARTERY PRESSURE: 16 MMHG
TV REST PULMONARY ARTERY PRESSURE: 22 MMHG
UNIDENT CRYS URNS QL MICRO: ABNORMAL
URN SPEC COLLECT METH UR: ABNORMAL
UROBILINOGEN UR STRIP-ACNC: ABNORMAL EU/DL
VANCOMYCIN SERPL-MCNC: 13.7 UG/ML
VANCOMYCIN SERPL-MCNC: 18.5 UG/ML
VANCOMYCIN TROUGH SERPL-MCNC: 21.8 UG/ML (ref 10–22)
VIT B12 SERPL-MCNC: 800 PG/ML (ref 210–950)
WBC # BLD AUTO: 10.41 K/UL (ref 3.9–12.7)
WBC # BLD AUTO: 10.59 K/UL (ref 3.9–12.7)
WBC # BLD AUTO: 10.91 K/UL (ref 3.9–12.7)
WBC # BLD AUTO: 11.16 K/UL (ref 3.9–12.7)
WBC # BLD AUTO: 11.38 K/UL (ref 3.9–12.7)
WBC # BLD AUTO: 11.4 K/UL (ref 3.9–12.7)
WBC # BLD AUTO: 11.62 K/UL (ref 3.9–12.7)
WBC # BLD AUTO: 12.06 K/UL (ref 3.9–12.7)
WBC # BLD AUTO: 12.23 K/UL (ref 3.9–12.7)
WBC # BLD AUTO: 13.07 K/UL (ref 3.9–12.7)
WBC # BLD AUTO: 13.39 K/UL (ref 3.9–12.7)
WBC # BLD AUTO: 14.14 K/UL (ref 3.9–12.7)
WBC # BLD AUTO: 16.14 K/UL (ref 3.9–12.7)
WBC # BLD AUTO: 3.98 K/UL (ref 3.9–12.7)
WBC # BLD AUTO: 4.27 K/UL (ref 3.9–12.7)
WBC # BLD AUTO: 4.36 K/UL (ref 3.9–12.7)
WBC # BLD AUTO: 4.81 K/UL (ref 3.9–12.7)
WBC # BLD AUTO: 4.83 K/UL (ref 3.9–12.7)
WBC # BLD AUTO: 5.38 K/UL (ref 3.9–12.7)
WBC # BLD AUTO: 5.57 K/UL (ref 3.9–12.7)
WBC # BLD AUTO: 5.57 K/UL (ref 3.9–12.7)
WBC # BLD AUTO: 5.87 K/UL (ref 3.9–12.7)
WBC # BLD AUTO: 5.97 K/UL (ref 3.9–12.7)
WBC # BLD AUTO: 6.04 K/UL (ref 3.9–12.7)
WBC # BLD AUTO: 6.4 K/UL (ref 3.9–12.7)
WBC # BLD AUTO: 6.72 K/UL (ref 3.9–12.7)
WBC # BLD AUTO: 6.84 K/UL (ref 3.9–12.7)
WBC # BLD AUTO: 7.05 K/UL (ref 3.9–12.7)
WBC # BLD AUTO: 7.6 K/UL (ref 3.9–12.7)
WBC # BLD AUTO: 7.82 K/UL (ref 3.9–12.7)
WBC # BLD AUTO: 7.86 K/UL (ref 3.9–12.7)
WBC # BLD AUTO: 8.09 K/UL (ref 3.9–12.7)
WBC # BLD AUTO: 8.58 K/UL (ref 3.9–12.7)
WBC # BLD AUTO: 8.7 K/UL (ref 3.9–12.7)
WBC # BLD AUTO: 9 K/UL (ref 3.9–12.7)
WBC # BLD AUTO: 9.12 K/UL (ref 3.9–12.7)
WBC # BLD AUTO: 9.96 K/UL (ref 3.9–12.7)
WBC #/AREA URNS HPF: 38 /HPF (ref 0–5)
WBC CASTS #/AREA URNS LPF: 20 /LPF
WBC CLUMPS URNS QL MICRO: ABNORMAL

## 2022-01-01 PROCEDURE — 25000003 PHARM REV CODE 250: Performed by: INTERNAL MEDICINE

## 2022-01-01 PROCEDURE — 83735 ASSAY OF MAGNESIUM: CPT | Performed by: EMERGENCY MEDICINE

## 2022-01-01 PROCEDURE — 96376 TX/PRO/DX INJ SAME DRUG ADON: CPT

## 2022-01-01 PROCEDURE — 1160F PR REVIEW ALL MEDS BY PRESCRIBER/CLIN PHARMACIST DOCUMENTED: ICD-10-PCS | Mod: CPTII,S$GLB,, | Performed by: INTERNAL MEDICINE

## 2022-01-01 PROCEDURE — 85025 COMPLETE CBC W/AUTO DIFF WBC: CPT | Performed by: HOSPITALIST

## 2022-01-01 PROCEDURE — 99291 PR CRITICAL CARE, E/M 30-74 MINUTES: ICD-10-PCS | Mod: 25,,, | Performed by: INTERNAL MEDICINE

## 2022-01-01 PROCEDURE — 25000003 PHARM REV CODE 250: Performed by: OTOLARYNGOLOGY

## 2022-01-01 PROCEDURE — 1157F PR ADVANCE CARE PLAN OR EQUIV PRESENT IN MEDICAL RECORD: ICD-10-PCS | Mod: CPTII,S$GLB,, | Performed by: NURSE PRACTITIONER

## 2022-01-01 PROCEDURE — 83880 ASSAY OF NATRIURETIC PEPTIDE: CPT | Performed by: HOSPITALIST

## 2022-01-01 PROCEDURE — 85520 HEPARIN ASSAY: CPT | Performed by: INTERNAL MEDICINE

## 2022-01-01 PROCEDURE — 96361 HYDRATE IV INFUSION ADD-ON: CPT

## 2022-01-01 PROCEDURE — 96413 CHEMO IV INFUSION 1 HR: CPT

## 2022-01-01 PROCEDURE — 88185 FLOWCYTOMETRY/TC ADD-ON: CPT | Mod: 59 | Performed by: PATHOLOGY

## 2022-01-01 PROCEDURE — 21400001 HC TELEMETRY ROOM

## 2022-01-01 PROCEDURE — 27000221 HC OXYGEN, UP TO 24 HOURS

## 2022-01-01 PROCEDURE — 11000001 HC ACUTE MED/SURG PRIVATE ROOM

## 2022-01-01 PROCEDURE — 3078F DIAST BP <80 MM HG: CPT | Mod: CPTII,95,, | Performed by: INTERNAL MEDICINE

## 2022-01-01 PROCEDURE — 3288F FALL RISK ASSESSMENT DOCD: CPT | Mod: CPTII,S$GLB,, | Performed by: OPTOMETRIST

## 2022-01-01 PROCEDURE — 4010F PR ACE/ARB THEARPY RXD/TAKEN: ICD-10-PCS | Mod: CPTII,S$GLB,, | Performed by: INTERNAL MEDICINE

## 2022-01-01 PROCEDURE — 99215 PR OFFICE/OUTPT VISIT, EST, LEVL V, 40-54 MIN: ICD-10-PCS | Mod: S$GLB,,, | Performed by: NURSE PRACTITIONER

## 2022-01-01 PROCEDURE — 3288F FALL RISK ASSESSMENT DOCD: CPT | Mod: CPTII,S$GLB,, | Performed by: STUDENT IN AN ORGANIZED HEALTH CARE EDUCATION/TRAINING PROGRAM

## 2022-01-01 PROCEDURE — 83880 ASSAY OF NATRIURETIC PEPTIDE: CPT | Performed by: EMERGENCY MEDICINE

## 2022-01-01 PROCEDURE — 1159F PR MEDICATION LIST DOCUMENTED IN MEDICAL RECORD: ICD-10-PCS | Mod: CPTII,S$GLB,, | Performed by: PODIATRIST

## 2022-01-01 PROCEDURE — 84153 ASSAY OF PSA TOTAL: CPT | Performed by: UROLOGY

## 2022-01-01 PROCEDURE — 87086 URINE CULTURE/COLONY COUNT: CPT | Performed by: NURSE PRACTITIONER

## 2022-01-01 PROCEDURE — 25000003 PHARM REV CODE 250: Performed by: RADIOLOGY

## 2022-01-01 PROCEDURE — 93010 ELECTROCARDIOGRAM REPORT: CPT | Mod: ,,, | Performed by: INTERNAL MEDICINE

## 2022-01-01 PROCEDURE — 99900035 HC TECH TIME PER 15 MIN (STAT)

## 2022-01-01 PROCEDURE — 99999 PR PBB SHADOW E&M-EST. PATIENT-LVL III: CPT | Mod: PBBFAC,,, | Performed by: UROLOGY

## 2022-01-01 PROCEDURE — 3288F PR FALLS RISK ASSESSMENT DOCUMENTED: ICD-10-PCS | Mod: CPTII,S$GLB,, | Performed by: INTERNAL MEDICINE

## 2022-01-01 PROCEDURE — 94799 UNLISTED PULMONARY SVC/PX: CPT

## 2022-01-01 PROCEDURE — 99233 PR SUBSEQUENT HOSPITAL CARE,LEVL III: ICD-10-PCS | Mod: ,,, | Performed by: INTERNAL MEDICINE

## 2022-01-01 PROCEDURE — 3078F DIAST BP <80 MM HG: CPT | Mod: CPTII,S$GLB,, | Performed by: NURSE PRACTITIONER

## 2022-01-01 PROCEDURE — 1157F PR ADVANCE CARE PLAN OR EQUIV PRESENT IN MEDICAL RECORD: ICD-10-PCS | Mod: CPTII,,, | Performed by: PODIATRIST

## 2022-01-01 PROCEDURE — 27000646 HC AEROBIKA DEVICE

## 2022-01-01 PROCEDURE — 83735 ASSAY OF MAGNESIUM: CPT | Performed by: STUDENT IN AN ORGANIZED HEALTH CARE EDUCATION/TRAINING PROGRAM

## 2022-01-01 PROCEDURE — 96367 TX/PROPH/DG ADDL SEQ IV INF: CPT

## 2022-01-01 PROCEDURE — 94727 GAS DIL/WSHOT DETER LNG VOL: CPT | Mod: S$GLB,,, | Performed by: INTERNAL MEDICINE

## 2022-01-01 PROCEDURE — 1157F ADVNC CARE PLAN IN RCRD: CPT | Mod: CPTII,S$GLB,, | Performed by: UROLOGY

## 2022-01-01 PROCEDURE — 99499 UNLISTED E&M SERVICE: CPT | Mod: 95,,, | Performed by: INTERNAL MEDICINE

## 2022-01-01 PROCEDURE — 3074F PR MOST RECENT SYSTOLIC BLOOD PRESSURE < 130 MM HG: ICD-10-PCS | Mod: CPTII,S$GLB,, | Performed by: INTERNAL MEDICINE

## 2022-01-01 PROCEDURE — 1159F MED LIST DOCD IN RCRD: CPT | Mod: CPTII,S$GLB,, | Performed by: INTERNAL MEDICINE

## 2022-01-01 PROCEDURE — 99232 PR SUBSEQUENT HOSPITAL CARE,LEVL II: ICD-10-PCS | Mod: GC,,, | Performed by: INTERNAL MEDICINE

## 2022-01-01 PROCEDURE — 99999 PR PBB SHADOW E&M-EST. PATIENT-LVL V: CPT | Mod: PBBFAC,,, | Performed by: STUDENT IN AN ORGANIZED HEALTH CARE EDUCATION/TRAINING PROGRAM

## 2022-01-01 PROCEDURE — 99214 PR OFFICE/OUTPT VISIT, EST, LEVL IV, 30-39 MIN: ICD-10-PCS | Mod: 95,,, | Performed by: INTERNAL MEDICINE

## 2022-01-01 PROCEDURE — 99214 OFFICE O/P EST MOD 30 MIN: CPT | Mod: S$GLB,,, | Performed by: INTERNAL MEDICINE

## 2022-01-01 PROCEDURE — 99233 SBSQ HOSP IP/OBS HIGH 50: CPT | Mod: GC,,, | Performed by: STUDENT IN AN ORGANIZED HEALTH CARE EDUCATION/TRAINING PROGRAM

## 2022-01-01 PROCEDURE — 94761 N-INVAS EAR/PLS OXIMETRY MLT: CPT

## 2022-01-01 PROCEDURE — 1157F PR ADVANCE CARE PLAN OR EQUIV PRESENT IN MEDICAL RECORD: ICD-10-PCS | Mod: CPTII,S$GLB,, | Performed by: PODIATRIST

## 2022-01-01 PROCEDURE — 3288F FALL RISK ASSESSMENT DOCD: CPT | Mod: CPTII,S$GLB,, | Performed by: PODIATRIST

## 2022-01-01 PROCEDURE — 63600175 PHARM REV CODE 636 W HCPCS: Performed by: ANESTHESIOLOGY

## 2022-01-01 PROCEDURE — 3066F NEPHROPATHY DOC TX: CPT | Mod: CPTII,95,, | Performed by: INTERNAL MEDICINE

## 2022-01-01 PROCEDURE — 99233 SBSQ HOSP IP/OBS HIGH 50: CPT | Mod: ,,, | Performed by: INTERNAL MEDICINE

## 2022-01-01 PROCEDURE — 80048 BASIC METABOLIC PNL TOTAL CA: CPT | Performed by: STUDENT IN AN ORGANIZED HEALTH CARE EDUCATION/TRAINING PROGRAM

## 2022-01-01 PROCEDURE — 1157F ADVNC CARE PLAN IN RCRD: CPT | Mod: CPTII,95,, | Performed by: OTOLARYNGOLOGY

## 2022-01-01 PROCEDURE — 3288F PR FALLS RISK ASSESSMENT DOCUMENTED: ICD-10-PCS | Mod: CPTII,S$GLB,, | Performed by: PODIATRIST

## 2022-01-01 PROCEDURE — 3066F NEPHROPATHY DOC TX: CPT | Mod: CPTII,S$GLB,, | Performed by: INTERNAL MEDICINE

## 2022-01-01 PROCEDURE — 99223 PR INITIAL HOSPITAL CARE,LEVL III: ICD-10-PCS | Mod: ,,, | Performed by: INTERNAL MEDICINE

## 2022-01-01 PROCEDURE — 1125F AMNT PAIN NOTED PAIN PRSNT: CPT | Mod: CPTII,S$GLB,, | Performed by: INTERNAL MEDICINE

## 2022-01-01 PROCEDURE — A4216 STERILE WATER/SALINE, 10 ML: HCPCS | Performed by: STUDENT IN AN ORGANIZED HEALTH CARE EDUCATION/TRAINING PROGRAM

## 2022-01-01 PROCEDURE — 1125F PR PAIN SEVERITY QUANTIFIED, PAIN PRESENT: ICD-10-PCS | Mod: CPTII,95,, | Performed by: INTERNAL MEDICINE

## 2022-01-01 PROCEDURE — 99213 OFFICE O/P EST LOW 20 MIN: CPT | Mod: S$GLB,,, | Performed by: PODIATRIST

## 2022-01-01 PROCEDURE — 3288F PR FALLS RISK ASSESSMENT DOCUMENTED: ICD-10-PCS | Mod: CPTII,S$GLB,, | Performed by: OTOLARYNGOLOGY

## 2022-01-01 PROCEDURE — 1157F PR ADVANCE CARE PLAN OR EQUIV PRESENT IN MEDICAL RECORD: ICD-10-PCS | Mod: CPTII,S$GLB,, | Performed by: INTERNAL MEDICINE

## 2022-01-01 PROCEDURE — 99214 PR OFFICE/OUTPT VISIT, EST, LEVL IV, 30-39 MIN: ICD-10-PCS | Mod: S$GLB,,, | Performed by: INTERNAL MEDICINE

## 2022-01-01 PROCEDURE — 94760 N-INVAS EAR/PLS OXIMETRY 1: CPT

## 2022-01-01 PROCEDURE — 25000003 PHARM REV CODE 250: Performed by: STUDENT IN AN ORGANIZED HEALTH CARE EDUCATION/TRAINING PROGRAM

## 2022-01-01 PROCEDURE — 25000003 PHARM REV CODE 250: Performed by: EMERGENCY MEDICINE

## 2022-01-01 PROCEDURE — 3288F FALL RISK ASSESSMENT DOCD: CPT | Mod: CPTII,S$GLB,, | Performed by: UROLOGY

## 2022-01-01 PROCEDURE — 25000242 PHARM REV CODE 250 ALT 637 W/ HCPCS: Performed by: INTERNAL MEDICINE

## 2022-01-01 PROCEDURE — 80048 BASIC METABOLIC PNL TOTAL CA: CPT | Performed by: HOSPITALIST

## 2022-01-01 PROCEDURE — 31575 DIAGNOSTIC LARYNGOSCOPY: CPT | Mod: ,,, | Performed by: OTOLARYNGOLOGY

## 2022-01-01 PROCEDURE — 1101F PR PT FALLS ASSESS DOC 0-1 FALLS W/OUT INJ PAST YR: ICD-10-PCS | Mod: CPTII,S$GLB,, | Performed by: OPTOMETRIST

## 2022-01-01 PROCEDURE — 82746 ASSAY OF FOLIC ACID SERUM: CPT

## 2022-01-01 PROCEDURE — 86920 COMPATIBILITY TEST SPIN: CPT | Performed by: INTERNAL MEDICINE

## 2022-01-01 PROCEDURE — 82962 GLUCOSE BLOOD TEST: CPT | Mod: S$GLB,,,

## 2022-01-01 PROCEDURE — 99233 SBSQ HOSP IP/OBS HIGH 50: CPT | Mod: GC,,, | Performed by: INTERNAL MEDICINE

## 2022-01-01 PROCEDURE — 99499 RISK ADDL DX/OHS AUDIT: ICD-10-PCS | Mod: 95,,, | Performed by: STUDENT IN AN ORGANIZED HEALTH CARE EDUCATION/TRAINING PROGRAM

## 2022-01-01 PROCEDURE — 71046 X-RAY EXAM CHEST 2 VIEWS: CPT | Mod: TC,FY

## 2022-01-01 PROCEDURE — 63600175 PHARM REV CODE 636 W HCPCS: Performed by: EMERGENCY MEDICINE

## 2022-01-01 PROCEDURE — 94664 DEMO&/EVAL PT USE INHALER: CPT

## 2022-01-01 PROCEDURE — 71000033 HC RECOVERY, INTIAL HOUR: Performed by: OTOLARYNGOLOGY

## 2022-01-01 PROCEDURE — 85520 HEPARIN ASSAY: CPT | Performed by: STUDENT IN AN ORGANIZED HEALTH CARE EDUCATION/TRAINING PROGRAM

## 2022-01-01 PROCEDURE — 88189 FLOWCYTOMETRY/READ 16 & >: CPT | Mod: ,,, | Performed by: PATHOLOGY

## 2022-01-01 PROCEDURE — 3074F PR MOST RECENT SYSTOLIC BLOOD PRESSURE < 130 MM HG: ICD-10-PCS | Mod: CPTII,S$GLB,, | Performed by: OTOLARYNGOLOGY

## 2022-01-01 PROCEDURE — 3066F PR DOCUMENTATION OF TREATMENT FOR NEPHROPATHY: ICD-10-PCS | Mod: CPTII,S$GLB,, | Performed by: STUDENT IN AN ORGANIZED HEALTH CARE EDUCATION/TRAINING PROGRAM

## 2022-01-01 PROCEDURE — 99233 PR SUBSEQUENT HOSPITAL CARE,LEVL III: ICD-10-PCS | Mod: GC,,, | Performed by: INTERNAL MEDICINE

## 2022-01-01 PROCEDURE — 1159F PR MEDICATION LIST DOCUMENTED IN MEDICAL RECORD: ICD-10-PCS | Mod: CPTII,S$GLB,, | Performed by: OPTOMETRIST

## 2022-01-01 PROCEDURE — 1100F PR PT FALLS ASSESS DOC 2+ FALLS/FALL W/INJURY/YR: ICD-10-PCS | Mod: CPTII,S$GLB,, | Performed by: STUDENT IN AN ORGANIZED HEALTH CARE EDUCATION/TRAINING PROGRAM

## 2022-01-01 PROCEDURE — 85025 COMPLETE CBC W/AUTO DIFF WBC: CPT | Performed by: INTERNAL MEDICINE

## 2022-01-01 PROCEDURE — 93010 EKG 12-LEAD: ICD-10-PCS | Mod: ,,, | Performed by: INTERNAL MEDICINE

## 2022-01-01 PROCEDURE — 94640 AIRWAY INHALATION TREATMENT: CPT

## 2022-01-01 PROCEDURE — 3074F PR MOST RECENT SYSTOLIC BLOOD PRESSURE < 130 MM HG: ICD-10-PCS | Mod: CPTII,S$GLB,, | Performed by: NURSE PRACTITIONER

## 2022-01-01 PROCEDURE — 25000242 PHARM REV CODE 250 ALT 637 W/ HCPCS

## 2022-01-01 PROCEDURE — 1159F MED LIST DOCD IN RCRD: CPT | Mod: CPTII,S$GLB,, | Performed by: SURGERY

## 2022-01-01 PROCEDURE — 63600175 PHARM REV CODE 636 W HCPCS: Performed by: SURGERY

## 2022-01-01 PROCEDURE — 1125F AMNT PAIN NOTED PAIN PRSNT: CPT | Mod: CPTII,,, | Performed by: PODIATRIST

## 2022-01-01 PROCEDURE — 10005 FNA BX W/US GDN 1ST LES: CPT | Mod: S$GLB,,, | Performed by: OTOLARYNGOLOGY

## 2022-01-01 PROCEDURE — 3288F PR FALLS RISK ASSESSMENT DOCUMENTED: ICD-10-PCS | Mod: CPTII,95,, | Performed by: INTERNAL MEDICINE

## 2022-01-01 PROCEDURE — 1126F AMNT PAIN NOTED NONE PRSNT: CPT | Mod: CPTII,S$GLB,, | Performed by: OPTOMETRIST

## 2022-01-01 PROCEDURE — 83880 ASSAY OF NATRIURETIC PEPTIDE: CPT | Performed by: STUDENT IN AN ORGANIZED HEALTH CARE EDUCATION/TRAINING PROGRAM

## 2022-01-01 PROCEDURE — 93000 ELECTROCARDIOGRAM COMPLETE: CPT | Mod: S$GLB,,, | Performed by: INTERNAL MEDICINE

## 2022-01-01 PROCEDURE — 25000003 PHARM REV CODE 250: Performed by: NURSE PRACTITIONER

## 2022-01-01 PROCEDURE — 4010F ACE/ARB THERAPY RXD/TAKEN: CPT | Mod: CPTII,S$GLB,, | Performed by: STUDENT IN AN ORGANIZED HEALTH CARE EDUCATION/TRAINING PROGRAM

## 2022-01-01 PROCEDURE — 36415 COLL VENOUS BLD VENIPUNCTURE: CPT | Performed by: HOSPITALIST

## 2022-01-01 PROCEDURE — 1126F AMNT PAIN NOTED NONE PRSNT: CPT | Mod: CPTII,S$GLB,, | Performed by: PODIATRIST

## 2022-01-01 PROCEDURE — 1101F PR PT FALLS ASSESS DOC 0-1 FALLS W/OUT INJ PAST YR: ICD-10-PCS | Mod: CPTII,S$GLB,, | Performed by: SURGERY

## 2022-01-01 PROCEDURE — 94660 CPAP INITIATION&MGMT: CPT

## 2022-01-01 PROCEDURE — 3078F DIAST BP <80 MM HG: CPT | Mod: CPTII,S$GLB,, | Performed by: INTERNAL MEDICINE

## 2022-01-01 PROCEDURE — 3288F FALL RISK ASSESSMENT DOCD: CPT | Mod: CPTII,S$GLB,, | Performed by: INTERNAL MEDICINE

## 2022-01-01 PROCEDURE — 99999 PR PBB SHADOW E&M-EST. PATIENT-LVL V: ICD-10-PCS | Mod: PBBFAC,,, | Performed by: NURSE PRACTITIONER

## 2022-01-01 PROCEDURE — 1157F PR ADVANCE CARE PLAN OR EQUIV PRESENT IN MEDICAL RECORD: ICD-10-PCS | Mod: CPTII,S$GLB,, | Performed by: OTOLARYNGOLOGY

## 2022-01-01 PROCEDURE — 1159F PR MEDICATION LIST DOCUMENTED IN MEDICAL RECORD: ICD-10-PCS | Mod: CPTII,S$GLB,, | Performed by: DERMATOLOGY

## 2022-01-01 PROCEDURE — 1111F DSCHRG MED/CURRENT MED MERGE: CPT | Mod: CPTII,S$GLB,, | Performed by: STUDENT IN AN ORGANIZED HEALTH CARE EDUCATION/TRAINING PROGRAM

## 2022-01-01 PROCEDURE — 1125F PR PAIN SEVERITY QUANTIFIED, PAIN PRESENT: ICD-10-PCS | Mod: CPTII,S$GLB,, | Performed by: INTERNAL MEDICINE

## 2022-01-01 PROCEDURE — 3078F PR MOST RECENT DIASTOLIC BLOOD PRESSURE < 80 MM HG: ICD-10-PCS | Mod: CPTII,S$GLB,, | Performed by: NURSE PRACTITIONER

## 2022-01-01 PROCEDURE — 1159F MED LIST DOCD IN RCRD: CPT | Mod: CPTII,S$GLB,, | Performed by: PODIATRIST

## 2022-01-01 PROCEDURE — 99499 UNLISTED E&M SERVICE: CPT | Mod: S$GLB,,, | Performed by: INTERNAL MEDICINE

## 2022-01-01 PROCEDURE — 83970 ASSAY OF PARATHORMONE: CPT | Performed by: NURSE PRACTITIONER

## 2022-01-01 PROCEDURE — 63600175 PHARM REV CODE 636 W HCPCS: Performed by: INTERNAL MEDICINE

## 2022-01-01 PROCEDURE — 36415 COLL VENOUS BLD VENIPUNCTURE: CPT | Performed by: INTERNAL MEDICINE

## 2022-01-01 PROCEDURE — 99999 PR PBB SHADOW E&M-EST. PATIENT-LVL III: ICD-10-PCS | Mod: PBBFAC,,, | Performed by: STUDENT IN AN ORGANIZED HEALTH CARE EDUCATION/TRAINING PROGRAM

## 2022-01-01 PROCEDURE — 97530 THERAPEUTIC ACTIVITIES: CPT

## 2022-01-01 PROCEDURE — 20600001 HC STEP DOWN PRIVATE ROOM

## 2022-01-01 PROCEDURE — 25000003 PHARM REV CODE 250: Performed by: HOSPITALIST

## 2022-01-01 PROCEDURE — 4010F ACE/ARB THERAPY RXD/TAKEN: CPT | Mod: CPTII,S$GLB,, | Performed by: INTERNAL MEDICINE

## 2022-01-01 PROCEDURE — 27000190 HC CPAP FULL FACE MASK W/VALVE

## 2022-01-01 PROCEDURE — 63600175 PHARM REV CODE 636 W HCPCS: Performed by: STUDENT IN AN ORGANIZED HEALTH CARE EDUCATION/TRAINING PROGRAM

## 2022-01-01 PROCEDURE — 93005 ELECTROCARDIOGRAM TRACING: CPT

## 2022-01-01 PROCEDURE — 97165 OT EVAL LOW COMPLEX 30 MIN: CPT

## 2022-01-01 PROCEDURE — 63600175 PHARM REV CODE 636 W HCPCS: Performed by: RADIOLOGY

## 2022-01-01 PROCEDURE — 36415 COLL VENOUS BLD VENIPUNCTURE: CPT | Mod: PO | Performed by: OTOLARYNGOLOGY

## 2022-01-01 PROCEDURE — 1100F PR PT FALLS ASSESS DOC 2+ FALLS/FALL W/INJURY/YR: ICD-10-PCS | Mod: CPTII,S$GLB,, | Performed by: INTERNAL MEDICINE

## 2022-01-01 PROCEDURE — 99212 PR OFFICE/OUTPT VISIT, EST, LEVL II, 10-19 MIN: ICD-10-PCS | Mod: 25,S$GLB,, | Performed by: DERMATOLOGY

## 2022-01-01 PROCEDURE — 1126F PR PAIN SEVERITY QUANTIFIED, NO PAIN PRESENT: ICD-10-PCS | Mod: CPTII,S$GLB,, | Performed by: OTOLARYNGOLOGY

## 2022-01-01 PROCEDURE — 80053 COMPREHEN METABOLIC PANEL: CPT | Performed by: EMERGENCY MEDICINE

## 2022-01-01 PROCEDURE — 71046 X-RAY EXAM CHEST 2 VIEWS: CPT | Mod: 26,,, | Performed by: RADIOLOGY

## 2022-01-01 PROCEDURE — 71250 CT THORAX DX C-: CPT | Mod: 26,,, | Performed by: RADIOLOGY

## 2022-01-01 PROCEDURE — 4010F PR ACE/ARB THEARPY RXD/TAKEN: ICD-10-PCS | Mod: CPTII,95,, | Performed by: INTERNAL MEDICINE

## 2022-01-01 PROCEDURE — 25500020 PHARM REV CODE 255: Performed by: OTOLARYNGOLOGY

## 2022-01-01 PROCEDURE — 99223 PR INITIAL HOSPITAL CARE,LEVL III: ICD-10-PCS | Mod: 25,,, | Performed by: OTOLARYNGOLOGY

## 2022-01-01 PROCEDURE — 36415 COLL VENOUS BLD VENIPUNCTURE: CPT | Performed by: EMERGENCY MEDICINE

## 2022-01-01 PROCEDURE — 1157F PR ADVANCE CARE PLAN OR EQUIV PRESENT IN MEDICAL RECORD: ICD-10-PCS | Mod: CPTII,95,, | Performed by: INTERNAL MEDICINE

## 2022-01-01 PROCEDURE — 99499 RISK ADDL DX/OHS AUDIT: ICD-10-PCS | Mod: S$GLB,,, | Performed by: NURSE PRACTITIONER

## 2022-01-01 PROCEDURE — 1160F RVW MEDS BY RX/DR IN RCRD: CPT | Mod: CPTII,S$GLB,, | Performed by: FAMILY MEDICINE

## 2022-01-01 PROCEDURE — 63600175 PHARM REV CODE 636 W HCPCS

## 2022-01-01 PROCEDURE — 4010F ACE/ARB THERAPY RXD/TAKEN: CPT | Mod: CPTII,S$GLB,, | Performed by: PODIATRIST

## 2022-01-01 PROCEDURE — 99215 PR OFFICE/OUTPT VISIT, EST, LEVL V, 40-54 MIN: ICD-10-PCS | Mod: S$GLB,,, | Performed by: STUDENT IN AN ORGANIZED HEALTH CARE EDUCATION/TRAINING PROGRAM

## 2022-01-01 PROCEDURE — 1159F MED LIST DOCD IN RCRD: CPT | Mod: CPTII,S$GLB,, | Performed by: NURSE PRACTITIONER

## 2022-01-01 PROCEDURE — G0180 MD CERTIFICATION HHA PATIENT: HCPCS | Mod: ,,, | Performed by: INTERNAL MEDICINE

## 2022-01-01 PROCEDURE — 85025 COMPLETE CBC W/AUTO DIFF WBC: CPT | Performed by: EMERGENCY MEDICINE

## 2022-01-01 PROCEDURE — 1101F PR PT FALLS ASSESS DOC 0-1 FALLS W/OUT INJ PAST YR: ICD-10-PCS | Mod: CPTII,S$GLB,, | Performed by: INTERNAL MEDICINE

## 2022-01-01 PROCEDURE — 1160F RVW MEDS BY RX/DR IN RCRD: CPT | Mod: CPTII,S$GLB,,

## 2022-01-01 PROCEDURE — 3077F SYST BP >= 140 MM HG: CPT | Mod: CPTII,S$GLB,, | Performed by: INTERNAL MEDICINE

## 2022-01-01 PROCEDURE — 1126F PR PAIN SEVERITY QUANTIFIED, NO PAIN PRESENT: ICD-10-PCS | Mod: CPTII,S$GLB,, | Performed by: INTERNAL MEDICINE

## 2022-01-01 PROCEDURE — 71000039 HC RECOVERY, EACH ADD'L HOUR: Performed by: OTOLARYNGOLOGY

## 2022-01-01 PROCEDURE — 76937 US GUIDE VASCULAR ACCESS: CPT | Mod: 26,,, | Performed by: RADIOLOGY

## 2022-01-01 PROCEDURE — 85730 THROMBOPLASTIN TIME PARTIAL: CPT | Mod: 91 | Performed by: INTERNAL MEDICINE

## 2022-01-01 PROCEDURE — 92012 PR EYE EXAM, EST PATIENT,INTERMED: ICD-10-PCS | Mod: S$GLB,,, | Performed by: OPTOMETRIST

## 2022-01-01 PROCEDURE — 3288F PR FALLS RISK ASSESSMENT DOCUMENTED: ICD-10-PCS | Mod: CPTII,S$GLB,, | Performed by: DERMATOLOGY

## 2022-01-01 PROCEDURE — 1126F PR PAIN SEVERITY QUANTIFIED, NO PAIN PRESENT: ICD-10-PCS | Mod: CPTII,S$GLB,, | Performed by: NURSE PRACTITIONER

## 2022-01-01 PROCEDURE — 36000708 HC OR TIME LEV III 1ST 15 MIN: Performed by: OTOLARYNGOLOGY

## 2022-01-01 PROCEDURE — 93000 EKG 12-LEAD: ICD-10-PCS | Mod: S$GLB,,, | Performed by: INTERNAL MEDICINE

## 2022-01-01 PROCEDURE — 85610 PROTHROMBIN TIME: CPT

## 2022-01-01 PROCEDURE — 36415 COLL VENOUS BLD VENIPUNCTURE: CPT

## 2022-01-01 PROCEDURE — 88312 SPECIAL STAINS GROUP 1: CPT | Mod: 26,,, | Performed by: PATHOLOGY

## 2022-01-01 PROCEDURE — 94727 PR PULM FUNCTION TEST BY GAS: ICD-10-PCS | Mod: S$GLB,,, | Performed by: INTERNAL MEDICINE

## 2022-01-01 PROCEDURE — 3078F DIAST BP <80 MM HG: CPT | Mod: CPTII,S$GLB,, | Performed by: SURGERY

## 2022-01-01 PROCEDURE — 1157F ADVNC CARE PLAN IN RCRD: CPT | Mod: CPTII,S$GLB,, | Performed by: SURGERY

## 2022-01-01 PROCEDURE — 86920 COMPATIBILITY TEST SPIN: CPT

## 2022-01-01 PROCEDURE — 99214 PR OFFICE/OUTPT VISIT, EST, LEVL IV, 30-39 MIN: ICD-10-PCS | Mod: 95,,, | Performed by: STUDENT IN AN ORGANIZED HEALTH CARE EDUCATION/TRAINING PROGRAM

## 2022-01-01 PROCEDURE — 85025 COMPLETE CBC W/AUTO DIFF WBC: CPT | Mod: 91 | Performed by: EMERGENCY MEDICINE

## 2022-01-01 PROCEDURE — 97110 THERAPEUTIC EXERCISES: CPT

## 2022-01-01 PROCEDURE — 1111F DSCHRG MED/CURRENT MED MERGE: CPT | Mod: CPTII,S$GLB,, | Performed by: NURSE PRACTITIONER

## 2022-01-01 PROCEDURE — 63600175 PHARM REV CODE 636 W HCPCS: Performed by: HOSPITALIST

## 2022-01-01 PROCEDURE — 1111F DSCHRG MED/CURRENT MED MERGE: CPT | Mod: CPTII,S$GLB,, | Performed by: OTOLARYNGOLOGY

## 2022-01-01 PROCEDURE — 80202 ASSAY OF VANCOMYCIN: CPT | Performed by: INTERNAL MEDICINE

## 2022-01-01 PROCEDURE — 99291 CRITICAL CARE FIRST HOUR: CPT | Mod: 25

## 2022-01-01 PROCEDURE — 1100F PTFALLS ASSESS-DOCD GE2>/YR: CPT | Mod: CPTII,S$GLB,, | Performed by: INTERNAL MEDICINE

## 2022-01-01 PROCEDURE — 1101F PR PT FALLS ASSESS DOC 0-1 FALLS W/OUT INJ PAST YR: ICD-10-PCS | Mod: CPTII,S$GLB,, | Performed by: DERMATOLOGY

## 2022-01-01 PROCEDURE — 84484 ASSAY OF TROPONIN QUANT: CPT | Performed by: EMERGENCY MEDICINE

## 2022-01-01 PROCEDURE — 99499 RISK ADDL DX/OHS AUDIT: ICD-10-PCS | Mod: S$GLB,,, | Performed by: STUDENT IN AN ORGANIZED HEALTH CARE EDUCATION/TRAINING PROGRAM

## 2022-01-01 PROCEDURE — 36415 COLL VENOUS BLD VENIPUNCTURE: CPT | Performed by: STUDENT IN AN ORGANIZED HEALTH CARE EDUCATION/TRAINING PROGRAM

## 2022-01-01 PROCEDURE — 99999 PR PBB SHADOW E&M-EST. PATIENT-LVL II: CPT | Mod: PBBFAC,,, | Performed by: OTOLARYNGOLOGY

## 2022-01-01 PROCEDURE — 1160F RVW MEDS BY RX/DR IN RCRD: CPT | Mod: CPTII,S$GLB,, | Performed by: NURSE PRACTITIONER

## 2022-01-01 PROCEDURE — 82306 VITAMIN D 25 HYDROXY: CPT | Performed by: NURSE PRACTITIONER

## 2022-01-01 PROCEDURE — 1126F AMNT PAIN NOTED NONE PRSNT: CPT | Mod: CPTII,S$GLB,, | Performed by: OTOLARYNGOLOGY

## 2022-01-01 PROCEDURE — 17003 DESTRUCT PREMALG LES 2-14: CPT | Mod: S$GLB,,, | Performed by: DERMATOLOGY

## 2022-01-01 PROCEDURE — 87040 BLOOD CULTURE FOR BACTERIA: CPT | Mod: 59 | Performed by: EMERGENCY MEDICINE

## 2022-01-01 PROCEDURE — 3078F DIAST BP <80 MM HG: CPT | Mod: CPTII,S$GLB,,

## 2022-01-01 PROCEDURE — 1159F MED LIST DOCD IN RCRD: CPT | Mod: CPTII,S$GLB,, | Performed by: OPTOMETRIST

## 2022-01-01 PROCEDURE — 31526 PR LARYNGOSCOPY,DIRECT,DX,OP MICROSCOP: ICD-10-PCS | Mod: 51,,, | Performed by: OTOLARYNGOLOGY

## 2022-01-01 PROCEDURE — 25000242 PHARM REV CODE 250 ALT 637 W/ HCPCS: Performed by: STUDENT IN AN ORGANIZED HEALTH CARE EDUCATION/TRAINING PROGRAM

## 2022-01-01 PROCEDURE — 99214 PR OFFICE/OUTPT VISIT, EST, LEVL IV, 30-39 MIN: ICD-10-PCS | Mod: 25,S$GLB,, | Performed by: OTOLARYNGOLOGY

## 2022-01-01 PROCEDURE — 99499 UNLISTED E&M SERVICE: CPT | Mod: ,,, | Performed by: PODIATRIST

## 2022-01-01 PROCEDURE — D9220A PRA ANESTHESIA: Mod: ANES,,, | Performed by: ANESTHESIOLOGY

## 2022-01-01 PROCEDURE — 4010F PR ACE/ARB THEARPY RXD/TAKEN: ICD-10-PCS | Mod: CPTII,95,, | Performed by: OTOLARYNGOLOGY

## 2022-01-01 PROCEDURE — 31575 DIAGNOSTIC LARYNGOSCOPY: CPT

## 2022-01-01 PROCEDURE — 1160F PR REVIEW ALL MEDS BY PRESCRIBER/CLIN PHARMACIST DOCUMENTED: ICD-10-PCS | Mod: CPTII,S$GLB,, | Performed by: PODIATRIST

## 2022-01-01 PROCEDURE — 99999 PR PBB SHADOW E&M-EST. PATIENT-LVL II: ICD-10-PCS | Mod: PBBFAC,,, | Performed by: DERMATOLOGY

## 2022-01-01 PROCEDURE — 83735 ASSAY OF MAGNESIUM: CPT | Performed by: INTERNAL MEDICINE

## 2022-01-01 PROCEDURE — 97116 GAIT TRAINING THERAPY: CPT

## 2022-01-01 PROCEDURE — 1157F ADVNC CARE PLAN IN RCRD: CPT | Mod: CPTII,S$GLB,, | Performed by: INTERNAL MEDICINE

## 2022-01-01 PROCEDURE — 80061 LIPID PANEL: CPT | Performed by: INTERNAL MEDICINE

## 2022-01-01 PROCEDURE — 99214 OFFICE O/P EST MOD 30 MIN: CPT | Mod: 95,,, | Performed by: STUDENT IN AN ORGANIZED HEALTH CARE EDUCATION/TRAINING PROGRAM

## 2022-01-01 PROCEDURE — 99999 PR PBB SHADOW E&M-EST. PATIENT-LVL II: ICD-10-PCS | Mod: PBBFAC,,, | Performed by: OPTOMETRIST

## 2022-01-01 PROCEDURE — 83605 ASSAY OF LACTIC ACID: CPT | Performed by: PHYSICIAN ASSISTANT

## 2022-01-01 PROCEDURE — 92526 ORAL FUNCTION THERAPY: CPT

## 2022-01-01 PROCEDURE — 99499 UNLISTED E&M SERVICE: CPT | Mod: 95,,, | Performed by: STUDENT IN AN ORGANIZED HEALTH CARE EDUCATION/TRAINING PROGRAM

## 2022-01-01 PROCEDURE — 1126F PR PAIN SEVERITY QUANTIFIED, NO PAIN PRESENT: ICD-10-PCS | Mod: CPTII,S$GLB,, | Performed by: PODIATRIST

## 2022-01-01 PROCEDURE — 3077F SYST BP >= 140 MM HG: CPT | Mod: CPTII,S$GLB,, | Performed by: STUDENT IN AN ORGANIZED HEALTH CARE EDUCATION/TRAINING PROGRAM

## 2022-01-01 PROCEDURE — 99215 OFFICE O/P EST HI 40 MIN: CPT | Mod: S$GLB,,, | Performed by: NURSE PRACTITIONER

## 2022-01-01 PROCEDURE — 99999 PR PBB SHADOW E&M-EST. PATIENT-LVL V: CPT | Mod: PBBFAC,,, | Performed by: INTERNAL MEDICINE

## 2022-01-01 PROCEDURE — 3078F PR MOST RECENT DIASTOLIC BLOOD PRESSURE < 80 MM HG: ICD-10-PCS | Mod: CPTII,S$GLB,, | Performed by: INTERNAL MEDICINE

## 2022-01-01 PROCEDURE — 84100 ASSAY OF PHOSPHORUS: CPT | Performed by: STUDENT IN AN ORGANIZED HEALTH CARE EDUCATION/TRAINING PROGRAM

## 2022-01-01 PROCEDURE — 85045 AUTOMATED RETICULOCYTE COUNT: CPT

## 2022-01-01 PROCEDURE — 27201423 OPTIME MED/SURG SUP & DEVICES STERILE SUPPLY: Performed by: OTOLARYNGOLOGY

## 2022-01-01 PROCEDURE — 1111F PR DISCHARGE MEDS RECONCILED W/ CURRENT OUTPATIENT MED LIST: ICD-10-PCS | Mod: CPTII,S$GLB,, | Performed by: INTERNAL MEDICINE

## 2022-01-01 PROCEDURE — 1160F RVW MEDS BY RX/DR IN RCRD: CPT | Mod: CPTII,S$GLB,, | Performed by: INTERNAL MEDICINE

## 2022-01-01 PROCEDURE — 4010F PR ACE/ARB THEARPY RXD/TAKEN: ICD-10-PCS | Mod: CPTII,S$GLB,, | Performed by: STUDENT IN AN ORGANIZED HEALTH CARE EDUCATION/TRAINING PROGRAM

## 2022-01-01 PROCEDURE — 1160F RVW MEDS BY RX/DR IN RCRD: CPT | Mod: CPTII,,, | Performed by: PODIATRIST

## 2022-01-01 PROCEDURE — 4010F PR ACE/ARB THEARPY RXD/TAKEN: ICD-10-PCS | Mod: CPTII,S$GLB,, | Performed by: NURSE PRACTITIONER

## 2022-01-01 PROCEDURE — U0002 COVID-19 LAB TEST NON-CDC: HCPCS | Performed by: EMERGENCY MEDICINE

## 2022-01-01 PROCEDURE — 1101F PT FALLS ASSESS-DOCD LE1/YR: CPT | Mod: CPTII,S$GLB,, | Performed by: DERMATOLOGY

## 2022-01-01 PROCEDURE — 3074F SYST BP LT 130 MM HG: CPT | Mod: CPTII,S$GLB,, | Performed by: INTERNAL MEDICINE

## 2022-01-01 PROCEDURE — 93306 TTE W/DOPPLER COMPLETE: CPT | Mod: 26,,, | Performed by: INTERNAL MEDICINE

## 2022-01-01 PROCEDURE — 88305 TISSUE EXAM BY PATHOLOGIST: CPT | Performed by: PATHOLOGY

## 2022-01-01 PROCEDURE — 3288F FALL RISK ASSESSMENT DOCD: CPT | Mod: CPTII,S$GLB,, | Performed by: DERMATOLOGY

## 2022-01-01 PROCEDURE — 1100F PTFALLS ASSESS-DOCD GE2>/YR: CPT | Mod: CPTII,S$GLB,, | Performed by: OTOLARYNGOLOGY

## 2022-01-01 PROCEDURE — 1159F MED LIST DOCD IN RCRD: CPT | Mod: CPTII,S$GLB,, | Performed by: DERMATOLOGY

## 2022-01-01 PROCEDURE — 70491 CT SOFT TISSUE NECK W/DYE: CPT | Mod: TC

## 2022-01-01 PROCEDURE — 71046 XR CHEST PA AND LATERAL: ICD-10-PCS | Mod: 26,,, | Performed by: RADIOLOGY

## 2022-01-01 PROCEDURE — 88309 TISSUE EXAM BY PATHOLOGIST: CPT | Mod: 26,,, | Performed by: PATHOLOGY

## 2022-01-01 PROCEDURE — 97110 THERAPEUTIC EXERCISES: CPT | Mod: CQ

## 2022-01-01 PROCEDURE — 85025 COMPLETE CBC W/AUTO DIFF WBC: CPT | Performed by: STUDENT IN AN ORGANIZED HEALTH CARE EDUCATION/TRAINING PROGRAM

## 2022-01-01 PROCEDURE — U0002 COVID-19 LAB TEST NON-CDC: HCPCS

## 2022-01-01 PROCEDURE — 1100F PR PT FALLS ASSESS DOC 2+ FALLS/FALL W/INJURY/YR: ICD-10-PCS | Mod: CPTII,S$GLB,, | Performed by: PODIATRIST

## 2022-01-01 PROCEDURE — 92610 EVALUATE SWALLOWING FUNCTION: CPT

## 2022-01-01 PROCEDURE — 99214 PR OFFICE/OUTPT VISIT, EST, LEVL IV, 30-39 MIN: ICD-10-PCS | Mod: 24,S$GLB,, | Performed by: OTOLARYNGOLOGY

## 2022-01-01 PROCEDURE — 1101F PR PT FALLS ASSESS DOC 0-1 FALLS W/OUT INJ PAST YR: ICD-10-PCS | Mod: CPTII,S$GLB,, | Performed by: OTOLARYNGOLOGY

## 2022-01-01 PROCEDURE — 3066F PR DOCUMENTATION OF TREATMENT FOR NEPHROPATHY: ICD-10-PCS | Mod: CPTII,S$GLB,, | Performed by: PODIATRIST

## 2022-01-01 PROCEDURE — 92012 INTRM OPH EXAM EST PATIENT: CPT | Mod: S$GLB,,, | Performed by: OPTOMETRIST

## 2022-01-01 PROCEDURE — 94729 DIFFUSING CAPACITY: CPT | Mod: S$GLB,,, | Performed by: INTERNAL MEDICINE

## 2022-01-01 PROCEDURE — 37000008 HC ANESTHESIA 1ST 15 MINUTES: Performed by: OTOLARYNGOLOGY

## 2022-01-01 PROCEDURE — 99999 PR PBB SHADOW E&M-EST. PATIENT-LVL III: CPT | Mod: PBBFAC,,, | Performed by: STUDENT IN AN ORGANIZED HEALTH CARE EDUCATION/TRAINING PROGRAM

## 2022-01-01 PROCEDURE — D9220A PRA ANESTHESIA: ICD-10-PCS | Mod: CRNA,,, | Performed by: STUDENT IN AN ORGANIZED HEALTH CARE EDUCATION/TRAINING PROGRAM

## 2022-01-01 PROCEDURE — 99215 OFFICE O/P EST HI 40 MIN: CPT | Mod: S$GLB,,, | Performed by: STUDENT IN AN ORGANIZED HEALTH CARE EDUCATION/TRAINING PROGRAM

## 2022-01-01 PROCEDURE — 1159F MED LIST DOCD IN RCRD: CPT | Mod: CPTII,S$GLB,, | Performed by: OTOLARYNGOLOGY

## 2022-01-01 PROCEDURE — 76536 US EXAM OF HEAD AND NECK: CPT | Mod: 26,,, | Performed by: RADIOLOGY

## 2022-01-01 PROCEDURE — 36591 DRAW BLOOD OFF VENOUS DEVICE: CPT

## 2022-01-01 PROCEDURE — 87811 SARS-COV-2 COVID19 W/OPTIC: CPT | Mod: GC,S$GLB,, | Performed by: STUDENT IN AN ORGANIZED HEALTH CARE EDUCATION/TRAINING PROGRAM

## 2022-01-01 PROCEDURE — 1125F PR PAIN SEVERITY QUANTIFIED, PAIN PRESENT: ICD-10-PCS | Mod: CPTII,S$GLB,, | Performed by: NURSE PRACTITIONER

## 2022-01-01 PROCEDURE — 3074F SYST BP LT 130 MM HG: CPT | Mod: CPTII,S$GLB,, | Performed by: STUDENT IN AN ORGANIZED HEALTH CARE EDUCATION/TRAINING PROGRAM

## 2022-01-01 PROCEDURE — A4550 SURGICAL TRAYS: HCPCS

## 2022-01-01 PROCEDURE — 1126F PR PAIN SEVERITY QUANTIFIED, NO PAIN PRESENT: ICD-10-PCS | Mod: CPTII,S$GLB,, | Performed by: UROLOGY

## 2022-01-01 PROCEDURE — 3066F PR DOCUMENTATION OF TREATMENT FOR NEPHROPATHY: ICD-10-PCS | Mod: CPTII,S$GLB,, | Performed by: INTERNAL MEDICINE

## 2022-01-01 PROCEDURE — 84100 ASSAY OF PHOSPHORUS: CPT | Performed by: NURSE PRACTITIONER

## 2022-01-01 PROCEDURE — 3078F PR MOST RECENT DIASTOLIC BLOOD PRESSURE < 80 MM HG: ICD-10-PCS | Mod: CPTII,S$GLB,, | Performed by: STUDENT IN AN ORGANIZED HEALTH CARE EDUCATION/TRAINING PROGRAM

## 2022-01-01 PROCEDURE — 25000003 PHARM REV CODE 250

## 2022-01-01 PROCEDURE — 99999 PR PBB SHADOW E&M-EST. PATIENT-LVL II: CPT | Mod: PBBFAC,,, | Performed by: DERMATOLOGY

## 2022-01-01 PROCEDURE — 93306 TTE W/DOPPLER COMPLETE: CPT

## 2022-01-01 PROCEDURE — 1159F PR MEDICATION LIST DOCUMENTED IN MEDICAL RECORD: ICD-10-PCS | Mod: CPTII,S$GLB,, | Performed by: INTERNAL MEDICINE

## 2022-01-01 PROCEDURE — 99223 1ST HOSP IP/OBS HIGH 75: CPT | Mod: 25,,, | Performed by: OTOLARYNGOLOGY

## 2022-01-01 PROCEDURE — 1159F MED LIST DOCD IN RCRD: CPT | Mod: CPTII,S$GLB,, | Performed by: UROLOGY

## 2022-01-01 PROCEDURE — 1159F PR MEDICATION LIST DOCUMENTED IN MEDICAL RECORD: ICD-10-PCS | Mod: CPTII,S$GLB,, | Performed by: UROLOGY

## 2022-01-01 PROCEDURE — 99499 UNLISTED E&M SERVICE: CPT | Mod: S$GLB,,, | Performed by: STUDENT IN AN ORGANIZED HEALTH CARE EDUCATION/TRAINING PROGRAM

## 2022-01-01 PROCEDURE — 99285 PR EMERGENCY DEPT VISIT,LEVEL V: ICD-10-PCS | Mod: CS,,, | Performed by: EMERGENCY MEDICINE

## 2022-01-01 PROCEDURE — 1100F PR PT FALLS ASSESS DOC 2+ FALLS/FALL W/INJURY/YR: ICD-10-PCS | Mod: CPTII,S$GLB,, | Performed by: NURSE PRACTITIONER

## 2022-01-01 PROCEDURE — 82607 VITAMIN B-12: CPT

## 2022-01-01 PROCEDURE — 1160F RVW MEDS BY RX/DR IN RCRD: CPT | Mod: CPTII,95,, | Performed by: STUDENT IN AN ORGANIZED HEALTH CARE EDUCATION/TRAINING PROGRAM

## 2022-01-01 PROCEDURE — 3077F PR MOST RECENT SYSTOLIC BLOOD PRESSURE >= 140 MM HG: ICD-10-PCS | Mod: CPTII,S$GLB,, | Performed by: STUDENT IN AN ORGANIZED HEALTH CARE EDUCATION/TRAINING PROGRAM

## 2022-01-01 PROCEDURE — 99999 PR PBB SHADOW E&M-EST. PATIENT-LVL III: ICD-10-PCS | Mod: PBBFAC,,, | Performed by: UROLOGY

## 2022-01-01 PROCEDURE — 3288F FALL RISK ASSESSMENT DOCD: CPT | Mod: CPTII,S$GLB,, | Performed by: OTOLARYNGOLOGY

## 2022-01-01 PROCEDURE — 99214 PR OFFICE/OUTPT VISIT, EST, LEVL IV, 30-39 MIN: ICD-10-PCS | Mod: S$GLB,,, | Performed by: UROLOGY

## 2022-01-01 PROCEDURE — 1157F ADVNC CARE PLAN IN RCRD: CPT | Mod: CPTII,S$GLB,, | Performed by: STUDENT IN AN ORGANIZED HEALTH CARE EDUCATION/TRAINING PROGRAM

## 2022-01-01 PROCEDURE — 99214 OFFICE O/P EST MOD 30 MIN: CPT | Mod: 95,,, | Performed by: OTOLARYNGOLOGY

## 2022-01-01 PROCEDURE — 1125F AMNT PAIN NOTED PAIN PRSNT: CPT | Mod: CPTII,95,, | Performed by: INTERNAL MEDICINE

## 2022-01-01 PROCEDURE — 27000207 HC ISOLATION

## 2022-01-01 PROCEDURE — 99499 UNLISTED E&M SERVICE: CPT | Mod: S$GLB,,, | Performed by: PODIATRIST

## 2022-01-01 PROCEDURE — 3078F PR MOST RECENT DIASTOLIC BLOOD PRESSURE < 80 MM HG: ICD-10-PCS | Mod: CPTII,95,, | Performed by: INTERNAL MEDICINE

## 2022-01-01 PROCEDURE — 97535 SELF CARE MNGMENT TRAINING: CPT

## 2022-01-01 PROCEDURE — 1157F PR ADVANCE CARE PLAN OR EQUIV PRESENT IN MEDICAL RECORD: ICD-10-PCS | Mod: CPTII,S$GLB,, | Performed by: STUDENT IN AN ORGANIZED HEALTH CARE EDUCATION/TRAINING PROGRAM

## 2022-01-01 PROCEDURE — 99283 EMERGENCY DEPT VISIT LOW MDM: CPT | Mod: 25

## 2022-01-01 PROCEDURE — 3066F NEPHROPATHY DOC TX: CPT | Mod: CPTII,S$GLB,, | Performed by: NURSE PRACTITIONER

## 2022-01-01 PROCEDURE — 1111F DSCHRG MED/CURRENT MED MERGE: CPT | Mod: CPTII,S$GLB,, | Performed by: INTERNAL MEDICINE

## 2022-01-01 PROCEDURE — 1157F ADVNC CARE PLAN IN RCRD: CPT | Mod: CPTII,S$GLB,, | Performed by: DERMATOLOGY

## 2022-01-01 PROCEDURE — 3288F FALL RISK ASSESSMENT DOCD: CPT | Mod: CPTII,S$GLB,, | Performed by: NURSE PRACTITIONER

## 2022-01-01 PROCEDURE — 1101F PT FALLS ASSESS-DOCD LE1/YR: CPT | Mod: CPTII,S$GLB,, | Performed by: STUDENT IN AN ORGANIZED HEALTH CARE EDUCATION/TRAINING PROGRAM

## 2022-01-01 PROCEDURE — 31526 DX LARYNGOSCOPY W/OPER SCOPE: CPT | Mod: 51,,, | Performed by: OTOLARYNGOLOGY

## 2022-01-01 PROCEDURE — 99999 PR PBB SHADOW E&M-EST. PATIENT-LVL III: CPT | Mod: PBBFAC,,, | Performed by: PODIATRIST

## 2022-01-01 PROCEDURE — 1111F PR DISCHARGE MEDS RECONCILED W/ CURRENT OUTPATIENT MED LIST: ICD-10-PCS | Mod: CPTII,S$GLB,, | Performed by: NURSE PRACTITIONER

## 2022-01-01 PROCEDURE — 17000 DESTRUCT PREMALG LESION: CPT | Mod: S$GLB,,, | Performed by: DERMATOLOGY

## 2022-01-01 PROCEDURE — 1160F PR REVIEW ALL MEDS BY PRESCRIBER/CLIN PHARMACIST DOCUMENTED: ICD-10-PCS | Mod: CPTII,S$GLB,, | Performed by: NURSE PRACTITIONER

## 2022-01-01 PROCEDURE — 99214 PR OFFICE/OUTPT VISIT, EST, LEVL IV, 30-39 MIN: ICD-10-PCS | Mod: GC,S$GLB,, | Performed by: STUDENT IN AN ORGANIZED HEALTH CARE EDUCATION/TRAINING PROGRAM

## 2022-01-01 PROCEDURE — 1126F AMNT PAIN NOTED NONE PRSNT: CPT | Mod: CPTII,S$GLB,, | Performed by: FAMILY MEDICINE

## 2022-01-01 PROCEDURE — 1101F PT FALLS ASSESS-DOCD LE1/YR: CPT | Mod: CPTII,S$GLB,, | Performed by: INTERNAL MEDICINE

## 2022-01-01 PROCEDURE — 4010F ACE/ARB THERAPY RXD/TAKEN: CPT | Mod: CPTII,S$GLB,, | Performed by: NURSE PRACTITIONER

## 2022-01-01 PROCEDURE — 86901 BLOOD TYPING SEROLOGIC RH(D): CPT | Performed by: HOSPITALIST

## 2022-01-01 PROCEDURE — 97166 OT EVAL MOD COMPLEX 45 MIN: CPT

## 2022-01-01 PROCEDURE — 99223 1ST HOSP IP/OBS HIGH 75: CPT | Mod: ,,, | Performed by: INTERNAL MEDICINE

## 2022-01-01 PROCEDURE — 99999 PR PBB SHADOW E&M-EST. PATIENT-LVL V: ICD-10-PCS | Mod: PBBFAC,,, | Performed by: STUDENT IN AN ORGANIZED HEALTH CARE EDUCATION/TRAINING PROGRAM

## 2022-01-01 PROCEDURE — 99497 ADVNCD CARE PLAN 30 MIN: CPT | Mod: S$GLB,,, | Performed by: STUDENT IN AN ORGANIZED HEALTH CARE EDUCATION/TRAINING PROGRAM

## 2022-01-01 PROCEDURE — 99999 PR PBB SHADOW E&M-EST. PATIENT-LVL V: CPT | Mod: PBBFAC,,, | Performed by: SURGERY

## 2022-01-01 PROCEDURE — 1159F PR MEDICATION LIST DOCUMENTED IN MEDICAL RECORD: ICD-10-PCS | Mod: CPTII,S$GLB,, | Performed by: NURSE PRACTITIONER

## 2022-01-01 PROCEDURE — 31575 PR LARYNGOSCOPY, FLEXIBLE; DIAGNOSTIC: ICD-10-PCS | Mod: S$GLB,,, | Performed by: OTOLARYNGOLOGY

## 2022-01-01 PROCEDURE — 99999 PR PBB SHADOW E&M-EST. PATIENT-LVL III: CPT | Mod: PBBFAC,,, | Performed by: INTERNAL MEDICINE

## 2022-01-01 PROCEDURE — 1101F PT FALLS ASSESS-DOCD LE1/YR: CPT | Mod: CPTII,S$GLB,, | Performed by: NURSE PRACTITIONER

## 2022-01-01 PROCEDURE — 77001 CHG FLUOROGUIDE CNTRL VEN ACCESS,PLACE,REPLACE,REMOVE: ICD-10-PCS | Mod: 26,,, | Performed by: RADIOLOGY

## 2022-01-01 PROCEDURE — 99214 OFFICE O/P EST MOD 30 MIN: CPT | Mod: 95,,, | Performed by: INTERNAL MEDICINE

## 2022-01-01 PROCEDURE — 36415 COLL VENOUS BLD VENIPUNCTURE: CPT | Performed by: NURSE PRACTITIONER

## 2022-01-01 PROCEDURE — 36561 IR TUNNELED CATH PLACEMENT WITH PORT: ICD-10-PCS | Mod: RT,,, | Performed by: RADIOLOGY

## 2022-01-01 PROCEDURE — 88333 PATH CONSLTJ SURG CYTO XM 1: CPT | Mod: 26,,, | Performed by: PATHOLOGY

## 2022-01-01 PROCEDURE — 1126F AMNT PAIN NOTED NONE PRSNT: CPT | Mod: CPTII,S$GLB,, | Performed by: UROLOGY

## 2022-01-01 PROCEDURE — 99417 PR PROLONGED SVC, OUTPT, W/WO DIRECT PT CONTACT,  EA ADDTL 15 MIN: ICD-10-PCS | Mod: S$GLB,,, | Performed by: NURSE PRACTITIONER

## 2022-01-01 PROCEDURE — 84100 ASSAY OF PHOSPHORUS: CPT | Performed by: EMERGENCY MEDICINE

## 2022-01-01 PROCEDURE — 88333 PR  INTRAOPERATIVE CYTO PATH CONSULT, INITIAL SITE: ICD-10-PCS | Mod: 26,,, | Performed by: PATHOLOGY

## 2022-01-01 PROCEDURE — 25000242 PHARM REV CODE 250 ALT 637 W/ HCPCS: Performed by: OTOLARYNGOLOGY

## 2022-01-01 PROCEDURE — 69210 PR REMOVAL IMPACTED CERUMEN REQUIRING INSTRUMENTATION, UNILATERAL: ICD-10-PCS | Mod: S$GLB,,, | Performed by: OTOLARYNGOLOGY

## 2022-01-01 PROCEDURE — 37000009 HC ANESTHESIA EA ADD 15 MINS: Performed by: OTOLARYNGOLOGY

## 2022-01-01 PROCEDURE — 1100F PR PT FALLS ASSESS DOC 2+ FALLS/FALL W/INJURY/YR: ICD-10-PCS | Mod: CPTII,S$GLB,, | Performed by: OTOLARYNGOLOGY

## 2022-01-01 PROCEDURE — 4010F PR ACE/ARB THEARPY RXD/TAKEN: ICD-10-PCS | Mod: CPTII,S$GLB,, | Performed by: DERMATOLOGY

## 2022-01-01 PROCEDURE — 1159F PR MEDICATION LIST DOCUMENTED IN MEDICAL RECORD: ICD-10-PCS | Mod: CPTII,S$GLB,, | Performed by: STUDENT IN AN ORGANIZED HEALTH CARE EDUCATION/TRAINING PROGRAM

## 2022-01-01 PROCEDURE — G0180 PR HOME HEALTH MD CERTIFICATION: ICD-10-PCS | Mod: ,,, | Performed by: INTERNAL MEDICINE

## 2022-01-01 PROCEDURE — 3078F DIAST BP <80 MM HG: CPT | Mod: CPTII,S$GLB,, | Performed by: FAMILY MEDICINE

## 2022-01-01 PROCEDURE — 80048 BASIC METABOLIC PNL TOTAL CA: CPT | Performed by: OTOLARYNGOLOGY

## 2022-01-01 PROCEDURE — 1159F PR MEDICATION LIST DOCUMENTED IN MEDICAL RECORD: ICD-10-PCS | Mod: CPTII,S$GLB,, | Performed by: OTOLARYNGOLOGY

## 2022-01-01 PROCEDURE — 4010F ACE/ARB THERAPY RXD/TAKEN: CPT | Mod: CPTII,95,, | Performed by: INTERNAL MEDICINE

## 2022-01-01 PROCEDURE — 3078F DIAST BP <80 MM HG: CPT | Mod: CPTII,S$GLB,, | Performed by: STUDENT IN AN ORGANIZED HEALTH CARE EDUCATION/TRAINING PROGRAM

## 2022-01-01 PROCEDURE — 63600175 PHARM REV CODE 636 W HCPCS: Performed by: PHYSICIAN ASSISTANT

## 2022-01-01 PROCEDURE — 1101F PT FALLS ASSESS-DOCD LE1/YR: CPT | Mod: CPTII,S$GLB,, | Performed by: OTOLARYNGOLOGY

## 2022-01-01 PROCEDURE — 77001 FLUOROGUIDE FOR VEIN DEVICE: CPT | Mod: TC | Performed by: RADIOLOGY

## 2022-01-01 PROCEDURE — 1157F PR ADVANCE CARE PLAN OR EQUIV PRESENT IN MEDICAL RECORD: ICD-10-PCS | Mod: CPTII,95,, | Performed by: OTOLARYNGOLOGY

## 2022-01-01 PROCEDURE — 4010F ACE/ARB THERAPY RXD/TAKEN: CPT | Mod: CPTII,95,, | Performed by: STUDENT IN AN ORGANIZED HEALTH CARE EDUCATION/TRAINING PROGRAM

## 2022-01-01 PROCEDURE — 1100F PTFALLS ASSESS-DOCD GE2>/YR: CPT | Mod: CPTII,S$GLB,, | Performed by: STUDENT IN AN ORGANIZED HEALTH CARE EDUCATION/TRAINING PROGRAM

## 2022-01-01 PROCEDURE — 3288F PR FALLS RISK ASSESSMENT DOCUMENTED: ICD-10-PCS | Mod: CPTII,S$GLB,, | Performed by: NURSE PRACTITIONER

## 2022-01-01 PROCEDURE — 1126F PR PAIN SEVERITY QUANTIFIED, NO PAIN PRESENT: ICD-10-PCS | Mod: CPTII,S$GLB,, | Performed by: FAMILY MEDICINE

## 2022-01-01 PROCEDURE — 1125F PR PAIN SEVERITY QUANTIFIED, PAIN PRESENT: ICD-10-PCS | Mod: CPTII,S$GLB,,

## 2022-01-01 PROCEDURE — 85730 THROMBOPLASTIN TIME PARTIAL: CPT | Performed by: INTERNAL MEDICINE

## 2022-01-01 PROCEDURE — 3066F NEPHROPATHY DOC TX: CPT | Mod: CPTII,S$GLB,, | Performed by: STUDENT IN AN ORGANIZED HEALTH CARE EDUCATION/TRAINING PROGRAM

## 2022-01-01 PROCEDURE — 1160F PR REVIEW ALL MEDS BY PRESCRIBER/CLIN PHARMACIST DOCUMENTED: ICD-10-PCS | Mod: CPTII,S$GLB,, | Performed by: DERMATOLOGY

## 2022-01-01 PROCEDURE — 4010F PR ACE/ARB THEARPY RXD/TAKEN: ICD-10-PCS | Mod: CPTII,,, | Performed by: PODIATRIST

## 2022-01-01 PROCEDURE — 1101F PR PT FALLS ASSESS DOC 0-1 FALLS W/OUT INJ PAST YR: ICD-10-PCS | Mod: CPTII,S$GLB,, | Performed by: NURSE PRACTITIONER

## 2022-01-01 PROCEDURE — 85520 HEPARIN ASSAY: CPT | Mod: 91 | Performed by: INTERNAL MEDICINE

## 2022-01-01 PROCEDURE — 85730 THROMBOPLASTIN TIME PARTIAL: CPT | Mod: 91 | Performed by: STUDENT IN AN ORGANIZED HEALTH CARE EDUCATION/TRAINING PROGRAM

## 2022-01-01 PROCEDURE — 25000242 PHARM REV CODE 250 ALT 637 W/ HCPCS: Performed by: HOSPITALIST

## 2022-01-01 PROCEDURE — 80053 COMPREHEN METABOLIC PANEL: CPT | Performed by: STUDENT IN AN ORGANIZED HEALTH CARE EDUCATION/TRAINING PROGRAM

## 2022-01-01 PROCEDURE — 1125F AMNT PAIN NOTED PAIN PRSNT: CPT | Mod: CPTII,S$GLB,,

## 2022-01-01 PROCEDURE — 96360 HYDRATION IV INFUSION INIT: CPT | Mod: 59

## 2022-01-01 PROCEDURE — 99213 OFFICE O/P EST LOW 20 MIN: CPT | Mod: S$GLB,,, | Performed by: NURSE PRACTITIONER

## 2022-01-01 PROCEDURE — 99999 PR PBB SHADOW E&M-EST. PATIENT-LVL III: ICD-10-PCS | Mod: PBBFAC,,, | Performed by: NURSE PRACTITIONER

## 2022-01-01 PROCEDURE — 88309 PR  SURG PATH,LEVEL VI: ICD-10-PCS | Mod: 26,,, | Performed by: PATHOLOGY

## 2022-01-01 PROCEDURE — 84466 ASSAY OF TRANSFERRIN: CPT

## 2022-01-01 PROCEDURE — 4010F PR ACE/ARB THEARPY RXD/TAKEN: ICD-10-PCS | Mod: CPTII,S$GLB,, | Performed by: OTOLARYNGOLOGY

## 2022-01-01 PROCEDURE — P9016 RBC LEUKOCYTES REDUCED: HCPCS

## 2022-01-01 PROCEDURE — 3074F PR MOST RECENT SYSTOLIC BLOOD PRESSURE < 130 MM HG: ICD-10-PCS | Mod: CPTII,S$GLB,, | Performed by: FAMILY MEDICINE

## 2022-01-01 PROCEDURE — 1126F PR PAIN SEVERITY QUANTIFIED, NO PAIN PRESENT: ICD-10-PCS | Mod: CPTII,S$GLB,, | Performed by: SURGERY

## 2022-01-01 PROCEDURE — 25000003 PHARM REV CODE 250: Performed by: NURSE ANESTHETIST, CERTIFIED REGISTERED

## 2022-01-01 PROCEDURE — 99214 OFFICE O/P EST MOD 30 MIN: CPT | Mod: 24,S$GLB,, | Performed by: OTOLARYNGOLOGY

## 2022-01-01 PROCEDURE — 1157F ADVNC CARE PLAN IN RCRD: CPT | Mod: CPTII,S$GLB,, | Performed by: FAMILY MEDICINE

## 2022-01-01 PROCEDURE — 3074F SYST BP LT 130 MM HG: CPT | Mod: CPTII,S$GLB,, | Performed by: FAMILY MEDICINE

## 2022-01-01 PROCEDURE — 11102 TANGNTL BX SKIN SINGLE LES: CPT | Mod: S$GLB,,, | Performed by: DERMATOLOGY

## 2022-01-01 PROCEDURE — 37000009 HC ANESTHESIA EA ADD 15 MINS

## 2022-01-01 PROCEDURE — 1159F MED LIST DOCD IN RCRD: CPT | Mod: CPTII,S$GLB,, | Performed by: STUDENT IN AN ORGANIZED HEALTH CARE EDUCATION/TRAINING PROGRAM

## 2022-01-01 PROCEDURE — 1157F ADVNC CARE PLAN IN RCRD: CPT | Mod: CPTII,S$GLB,, | Performed by: NURSE PRACTITIONER

## 2022-01-01 PROCEDURE — 97530 THERAPEUTIC ACTIVITIES: CPT | Mod: CQ

## 2022-01-01 PROCEDURE — 96366 THER/PROPH/DIAG IV INF ADDON: CPT

## 2022-01-01 PROCEDURE — 36561 INSERT TUNNELED CV CATH: CPT | Mod: RT | Performed by: RADIOLOGY

## 2022-01-01 PROCEDURE — 99204 PR OFFICE/OUTPT VISIT, NEW, LEVL IV, 45-59 MIN: ICD-10-PCS | Mod: S$GLB,,, | Performed by: STUDENT IN AN ORGANIZED HEALTH CARE EDUCATION/TRAINING PROGRAM

## 2022-01-01 PROCEDURE — 94010 BREATHING CAPACITY TEST: ICD-10-PCS | Mod: S$GLB,,, | Performed by: INTERNAL MEDICINE

## 2022-01-01 PROCEDURE — D9220A PRA ANESTHESIA: ICD-10-PCS | Mod: ANES,,, | Performed by: ANESTHESIOLOGY

## 2022-01-01 PROCEDURE — 3075F PR MOST RECENT SYSTOLIC BLOOD PRESS GE 130-139MM HG: ICD-10-PCS | Mod: CPTII,S$GLB,, | Performed by: OTOLARYNGOLOGY

## 2022-01-01 PROCEDURE — 86920 COMPATIBILITY TEST SPIN: CPT | Performed by: HOSPITALIST

## 2022-01-01 PROCEDURE — 3077F PR MOST RECENT SYSTOLIC BLOOD PRESSURE >= 140 MM HG: ICD-10-PCS | Mod: CPTII,S$GLB,, | Performed by: SURGERY

## 2022-01-01 PROCEDURE — 1157F ADVNC CARE PLAN IN RCRD: CPT | Mod: CPTII,S$GLB,, | Performed by: OPTOMETRIST

## 2022-01-01 PROCEDURE — 70491 CT SOFT TISSUE NECK WITH CONTRAST: ICD-10-PCS | Mod: 26,,, | Performed by: RADIOLOGY

## 2022-01-01 PROCEDURE — 3078F PR MOST RECENT DIASTOLIC BLOOD PRESSURE < 80 MM HG: ICD-10-PCS | Mod: CPTII,S$GLB,, | Performed by: SURGERY

## 2022-01-01 PROCEDURE — 1160F RVW MEDS BY RX/DR IN RCRD: CPT | Mod: CPTII,95,, | Performed by: INTERNAL MEDICINE

## 2022-01-01 PROCEDURE — 99285 EMERGENCY DEPT VISIT HI MDM: CPT | Mod: CS,,, | Performed by: EMERGENCY MEDICINE

## 2022-01-01 PROCEDURE — 3074F PR MOST RECENT SYSTOLIC BLOOD PRESSURE < 130 MM HG: ICD-10-PCS | Mod: CPTII,S$GLB,,

## 2022-01-01 PROCEDURE — 78815 PET IMAGE W/CT SKULL-THIGH: CPT | Mod: TC

## 2022-01-01 PROCEDURE — 3077F SYST BP >= 140 MM HG: CPT | Mod: CPTII,S$GLB,, | Performed by: SURGERY

## 2022-01-01 PROCEDURE — 1159F MED LIST DOCD IN RCRD: CPT | Mod: CPTII,S$GLB,, | Performed by: FAMILY MEDICINE

## 2022-01-01 PROCEDURE — 3066F PR DOCUMENTATION OF TREATMENT FOR NEPHROPATHY: ICD-10-PCS | Mod: CPTII,S$GLB,, | Performed by: NURSE PRACTITIONER

## 2022-01-01 PROCEDURE — 99215 PR OFFICE/OUTPT VISIT, EST, LEVL V, 40-54 MIN: ICD-10-PCS | Mod: S$GLB,,, | Performed by: INTERNAL MEDICINE

## 2022-01-01 PROCEDURE — 1157F ADVNC CARE PLAN IN RCRD: CPT | Mod: CPTII,95,, | Performed by: INTERNAL MEDICINE

## 2022-01-01 PROCEDURE — 99999 PR PBB SHADOW E&M-EST. PATIENT-LVL V: CPT | Mod: PBBFAC,,, | Performed by: NURSE PRACTITIONER

## 2022-01-01 PROCEDURE — 1160F PR REVIEW ALL MEDS BY PRESCRIBER/CLIN PHARMACIST DOCUMENTED: ICD-10-PCS | Mod: CPTII,S$GLB,, | Performed by: SURGERY

## 2022-01-01 PROCEDURE — 3066F PR DOCUMENTATION OF TREATMENT FOR NEPHROPATHY: ICD-10-PCS | Mod: CPTII,S$GLB,, | Performed by: DERMATOLOGY

## 2022-01-01 PROCEDURE — 80053 COMPREHEN METABOLIC PANEL: CPT | Performed by: INTERNAL MEDICINE

## 2022-01-01 PROCEDURE — 31575 DIAGNOSTIC LARYNGOSCOPY: CPT | Mod: S$GLB,,, | Performed by: OTOLARYNGOLOGY

## 2022-01-01 PROCEDURE — 92014 COMPRE OPH EXAM EST PT 1/>: CPT | Mod: S$GLB,,, | Performed by: OPTOMETRIST

## 2022-01-01 PROCEDURE — 36430 TRANSFUSION BLD/BLD COMPNT: CPT

## 2022-01-01 PROCEDURE — 1157F PR ADVANCE CARE PLAN OR EQUIV PRESENT IN MEDICAL RECORD: ICD-10-PCS | Mod: CPTII,95,, | Performed by: STUDENT IN AN ORGANIZED HEALTH CARE EDUCATION/TRAINING PROGRAM

## 2022-01-01 PROCEDURE — 1101F PR PT FALLS ASSESS DOC 0-1 FALLS W/OUT INJ PAST YR: ICD-10-PCS | Mod: CPTII,S$GLB,, | Performed by: STUDENT IN AN ORGANIZED HEALTH CARE EDUCATION/TRAINING PROGRAM

## 2022-01-01 PROCEDURE — 1160F RVW MEDS BY RX/DR IN RCRD: CPT | Mod: CPTII,S$GLB,, | Performed by: PODIATRIST

## 2022-01-01 PROCEDURE — 87077 CULTURE AEROBIC IDENTIFY: CPT | Performed by: NURSE PRACTITIONER

## 2022-01-01 PROCEDURE — 94010 BREATHING CAPACITY TEST: CPT | Mod: S$GLB,,, | Performed by: INTERNAL MEDICINE

## 2022-01-01 PROCEDURE — 1101F PT FALLS ASSESS-DOCD LE1/YR: CPT | Mod: CPTII,S$GLB,, | Performed by: UROLOGY

## 2022-01-01 PROCEDURE — 99999 PR PBB SHADOW E&M-EST. PATIENT-LVL V: ICD-10-PCS | Mod: PBBFAC,,, | Performed by: SURGERY

## 2022-01-01 PROCEDURE — 85025 COMPLETE CBC W/AUTO DIFF WBC: CPT | Performed by: NURSE PRACTITIONER

## 2022-01-01 PROCEDURE — 1126F AMNT PAIN NOTED NONE PRSNT: CPT | Mod: CPTII,S$GLB,, | Performed by: STUDENT IN AN ORGANIZED HEALTH CARE EDUCATION/TRAINING PROGRAM

## 2022-01-01 PROCEDURE — 88312 SPECIAL STAINS GROUP 1: CPT | Performed by: PATHOLOGY

## 2022-01-01 PROCEDURE — 99499 NO LOS: ICD-10-PCS | Mod: ,,, | Performed by: PODIATRIST

## 2022-01-01 PROCEDURE — 96523 IRRIG DRUG DELIVERY DEVICE: CPT

## 2022-01-01 PROCEDURE — 3078F PR MOST RECENT DIASTOLIC BLOOD PRESSURE < 80 MM HG: ICD-10-PCS | Mod: CPTII,S$GLB,, | Performed by: FAMILY MEDICINE

## 2022-01-01 PROCEDURE — 99213 PR OFFICE/OUTPT VISIT, EST, LEVL III, 20-29 MIN: ICD-10-PCS | Mod: S$GLB,,, | Performed by: FAMILY MEDICINE

## 2022-01-01 PROCEDURE — 1111F PR DISCHARGE MEDS RECONCILED W/ CURRENT OUTPATIENT MED LIST: ICD-10-PCS | Mod: CPTII,S$GLB,, | Performed by: STUDENT IN AN ORGANIZED HEALTH CARE EDUCATION/TRAINING PROGRAM

## 2022-01-01 PROCEDURE — 99223 PR INITIAL HOSPITAL CARE,LEVL III: ICD-10-PCS | Mod: GC,,, | Performed by: INTERNAL MEDICINE

## 2022-01-01 PROCEDURE — 76536 US SOFT TISSUE HEAD NECK THYROID: ICD-10-PCS | Mod: 26,,, | Performed by: RADIOLOGY

## 2022-01-01 PROCEDURE — 78815 NM PET CT ROUTINE: ICD-10-PCS | Mod: 26,PI,, | Performed by: RADIOLOGY

## 2022-01-01 PROCEDURE — 1157F ADVNC CARE PLAN IN RCRD: CPT | Mod: CPTII,S$GLB,,

## 2022-01-01 PROCEDURE — 96375 TX/PRO/DX INJ NEW DRUG ADDON: CPT

## 2022-01-01 PROCEDURE — 87040 BLOOD CULTURE FOR BACTERIA: CPT | Performed by: PHYSICIAN ASSISTANT

## 2022-01-01 PROCEDURE — 1159F PR MEDICATION LIST DOCUMENTED IN MEDICAL RECORD: ICD-10-PCS | Mod: CPTII,S$GLB,, | Performed by: FAMILY MEDICINE

## 2022-01-01 PROCEDURE — 1160F PR REVIEW ALL MEDS BY PRESCRIBER/CLIN PHARMACIST DOCUMENTED: ICD-10-PCS | Mod: CPTII,95,, | Performed by: STUDENT IN AN ORGANIZED HEALTH CARE EDUCATION/TRAINING PROGRAM

## 2022-01-01 PROCEDURE — 1160F PR REVIEW ALL MEDS BY PRESCRIBER/CLIN PHARMACIST DOCUMENTED: ICD-10-PCS | Mod: CPTII,95,, | Performed by: INTERNAL MEDICINE

## 2022-01-01 PROCEDURE — 3075F SYST BP GE 130 - 139MM HG: CPT | Mod: CPTII,S$GLB,, | Performed by: OTOLARYNGOLOGY

## 2022-01-01 PROCEDURE — 86580 TB INTRADERMAL TEST: CPT

## 2022-01-01 PROCEDURE — 17000 DESTRUCT PREMALG LESION: CPT | Mod: 59,S$GLB,, | Performed by: DERMATOLOGY

## 2022-01-01 PROCEDURE — 99232 SBSQ HOSP IP/OBS MODERATE 35: CPT | Mod: GC,,, | Performed by: INTERNAL MEDICINE

## 2022-01-01 PROCEDURE — 3288F FALL RISK ASSESSMENT DOCD: CPT | Mod: CPTII,S$GLB,, | Performed by: SURGERY

## 2022-01-01 PROCEDURE — 99499 RISK ADDL DX/OHS AUDIT: ICD-10-PCS | Mod: S$GLB,,, | Performed by: INTERNAL MEDICINE

## 2022-01-01 PROCEDURE — 76937 PR  US GUIDE, VASCULAR ACCESS: ICD-10-PCS | Mod: 26,,, | Performed by: RADIOLOGY

## 2022-01-01 PROCEDURE — 1126F AMNT PAIN NOTED NONE PRSNT: CPT | Mod: CPTII,S$GLB,, | Performed by: INTERNAL MEDICINE

## 2022-01-01 PROCEDURE — 1157F ADVNC CARE PLAN IN RCRD: CPT | Mod: CPTII,S$GLB,, | Performed by: PODIATRIST

## 2022-01-01 PROCEDURE — 3008F BODY MASS INDEX DOCD: CPT | Mod: CPTII,S$GLB,, | Performed by: INTERNAL MEDICINE

## 2022-01-01 PROCEDURE — 85610 PROTHROMBIN TIME: CPT | Performed by: FAMILY MEDICINE

## 2022-01-01 PROCEDURE — 3074F SYST BP LT 130 MM HG: CPT | Mod: CPTII,S$GLB,,

## 2022-01-01 PROCEDURE — 71250 CT CHEST WITHOUT CONTRAST: ICD-10-PCS | Mod: 26,,, | Performed by: RADIOLOGY

## 2022-01-01 PROCEDURE — 3078F PR MOST RECENT DIASTOLIC BLOOD PRESSURE < 80 MM HG: ICD-10-PCS | Mod: CPTII,S$GLB,,

## 2022-01-01 PROCEDURE — 1159F MED LIST DOCD IN RCRD: CPT | Mod: CPTII,95,, | Performed by: STUDENT IN AN ORGANIZED HEALTH CARE EDUCATION/TRAINING PROGRAM

## 2022-01-01 PROCEDURE — 1159F MED LIST DOCD IN RCRD: CPT | Mod: CPTII,95,, | Performed by: INTERNAL MEDICINE

## 2022-01-01 PROCEDURE — 92015 DETERMINE REFRACTIVE STATE: CPT | Mod: S$GLB,,, | Performed by: OPTOMETRIST

## 2022-01-01 PROCEDURE — 1125F PR PAIN SEVERITY QUANTIFIED, PAIN PRESENT: ICD-10-PCS | Mod: CPTII,S$GLB,, | Performed by: STUDENT IN AN ORGANIZED HEALTH CARE EDUCATION/TRAINING PROGRAM

## 2022-01-01 PROCEDURE — 1157F PR ADVANCE CARE PLAN OR EQUIV PRESENT IN MEDICAL RECORD: ICD-10-PCS | Mod: CPTII,S$GLB,, | Performed by: OPTOMETRIST

## 2022-01-01 PROCEDURE — 97161 PT EVAL LOW COMPLEX 20 MIN: CPT

## 2022-01-01 PROCEDURE — 1100F PTFALLS ASSESS-DOCD GE2>/YR: CPT | Mod: CPTII,S$GLB,, | Performed by: PODIATRIST

## 2022-01-01 PROCEDURE — 17000 PR DESTRUCTION(LASER SURGERY,CRYOSURGERY,CHEMOSURGERY),PREMALIGNANT LESIONS,FIRST LESION: ICD-10-PCS | Mod: 59,S$GLB,, | Performed by: DERMATOLOGY

## 2022-01-01 PROCEDURE — 99213 OFFICE O/P EST LOW 20 MIN: CPT | Mod: S$GLB,,, | Performed by: FAMILY MEDICINE

## 2022-01-01 PROCEDURE — 99999 PR PBB SHADOW E&M-EST. PATIENT-LVL II: ICD-10-PCS | Mod: PBBFAC,,, | Performed by: OTOLARYNGOLOGY

## 2022-01-01 PROCEDURE — 85025 COMPLETE CBC W/AUTO DIFF WBC: CPT | Performed by: FAMILY MEDICINE

## 2022-01-01 PROCEDURE — 88342 CHG IMMUNOCYTOCHEMISTRY: ICD-10-PCS | Mod: 26,,, | Performed by: PATHOLOGY

## 2022-01-01 PROCEDURE — 85025 COMPLETE CBC W/AUTO DIFF WBC: CPT | Performed by: OTOLARYNGOLOGY

## 2022-01-01 PROCEDURE — 25000003 PHARM REV CODE 250: Performed by: PHYSICIAN ASSISTANT

## 2022-01-01 PROCEDURE — 82272 OCCULT BLD FECES 1-3 TESTS: CPT | Performed by: INTERNAL MEDICINE

## 2022-01-01 PROCEDURE — 85025 COMPLETE CBC W/AUTO DIFF WBC: CPT | Performed by: PHYSICIAN ASSISTANT

## 2022-01-01 PROCEDURE — 80069 RENAL FUNCTION PANEL: CPT | Performed by: NURSE PRACTITIONER

## 2022-01-01 PROCEDURE — 4010F PR ACE/ARB THEARPY RXD/TAKEN: ICD-10-PCS | Mod: CPTII,S$GLB,, | Performed by: SURGERY

## 2022-01-01 PROCEDURE — 87186 SC STD MICRODIL/AGAR DIL: CPT | Performed by: NURSE PRACTITIONER

## 2022-01-01 PROCEDURE — 36415 COLL VENOUS BLD VENIPUNCTURE: CPT | Performed by: OTOLARYNGOLOGY

## 2022-01-01 PROCEDURE — 86901 BLOOD TYPING SEROLOGIC RH(D): CPT | Performed by: OTOLARYNGOLOGY

## 2022-01-01 PROCEDURE — 1111F DSCHRG MED/CURRENT MED MERGE: CPT | Mod: CPTII,S$GLB,, | Performed by: DERMATOLOGY

## 2022-01-01 PROCEDURE — 1160F PR REVIEW ALL MEDS BY PRESCRIBER/CLIN PHARMACIST DOCUMENTED: ICD-10-PCS | Mod: CPTII,S$GLB,, | Performed by: FAMILY MEDICINE

## 2022-01-01 PROCEDURE — 1160F RVW MEDS BY RX/DR IN RCRD: CPT | Mod: CPTII,S$GLB,, | Performed by: UROLOGY

## 2022-01-01 PROCEDURE — 97116 GAIT TRAINING THERAPY: CPT | Mod: CQ

## 2022-01-01 PROCEDURE — 3077F PR MOST RECENT SYSTOLIC BLOOD PRESSURE >= 140 MM HG: ICD-10-PCS | Mod: CPTII,S$GLB,, | Performed by: INTERNAL MEDICINE

## 2022-01-01 PROCEDURE — 1126F PR PAIN SEVERITY QUANTIFIED, NO PAIN PRESENT: ICD-10-PCS | Mod: CPTII,S$GLB,, | Performed by: DERMATOLOGY

## 2022-01-01 PROCEDURE — 1157F ADVNC CARE PLAN IN RCRD: CPT | Mod: CPTII,,, | Performed by: PODIATRIST

## 2022-01-01 PROCEDURE — 99499 RISK ADDL DX/OHS AUDIT: ICD-10-PCS | Mod: S$GLB,,, | Performed by: PODIATRIST

## 2022-01-01 PROCEDURE — 31575 PR LARYNGOSCOPY, FLEXIBLE; DIAGNOSTIC: ICD-10-PCS | Mod: ,,, | Performed by: OTOLARYNGOLOGY

## 2022-01-01 PROCEDURE — 93306 ECHO (CUPID ONLY): ICD-10-PCS | Mod: 26,,, | Performed by: INTERNAL MEDICINE

## 2022-01-01 PROCEDURE — 92015 PR REFRACTION: ICD-10-PCS | Mod: S$GLB,,, | Performed by: OPTOMETRIST

## 2022-01-01 PROCEDURE — D9220A PRA ANESTHESIA: ICD-10-PCS | Mod: ANES,,, | Performed by: SURGERY

## 2022-01-01 PROCEDURE — 20000000 HC ICU ROOM

## 2022-01-01 PROCEDURE — 99499 UNLISTED E&M SERVICE: CPT | Mod: S$GLB,,, | Performed by: NURSE PRACTITIONER

## 2022-01-01 PROCEDURE — 76937 US GUIDE VASCULAR ACCESS: CPT | Mod: TC | Performed by: RADIOLOGY

## 2022-01-01 PROCEDURE — 99204 PR OFFICE/OUTPT VISIT, NEW, LEVL IV, 45-59 MIN: ICD-10-PCS | Mod: S$GLB,,, | Performed by: SURGERY

## 2022-01-01 PROCEDURE — 3288F PR FALLS RISK ASSESSMENT DOCUMENTED: ICD-10-PCS | Mod: CPTII,S$GLB,, | Performed by: STUDENT IN AN ORGANIZED HEALTH CARE EDUCATION/TRAINING PROGRAM

## 2022-01-01 PROCEDURE — 1101F PT FALLS ASSESS-DOCD LE1/YR: CPT | Mod: CPTII,,, | Performed by: PODIATRIST

## 2022-01-01 PROCEDURE — 1125F PR PAIN SEVERITY QUANTIFIED, PAIN PRESENT: ICD-10-PCS | Mod: CPTII,,, | Performed by: PODIATRIST

## 2022-01-01 PROCEDURE — 99999 PR PBB SHADOW E&M-EST. PATIENT-LVL III: ICD-10-PCS | Mod: PBBFAC,,, | Performed by: INTERNAL MEDICINE

## 2022-01-01 PROCEDURE — 36415 COLL VENOUS BLD VENIPUNCTURE: CPT | Mod: PO | Performed by: FAMILY MEDICINE

## 2022-01-01 PROCEDURE — 1126F AMNT PAIN NOTED NONE PRSNT: CPT | Mod: CPTII,S$GLB,, | Performed by: DERMATOLOGY

## 2022-01-01 PROCEDURE — D9220A PRA ANESTHESIA: ICD-10-PCS | Mod: CRNA,,, | Performed by: NURSE ANESTHETIST, CERTIFIED REGISTERED

## 2022-01-01 PROCEDURE — 99204 OFFICE O/P NEW MOD 45 MIN: CPT | Mod: S$GLB,,, | Performed by: STUDENT IN AN ORGANIZED HEALTH CARE EDUCATION/TRAINING PROGRAM

## 2022-01-01 PROCEDURE — D9220A PRA ANESTHESIA: Mod: CRNA,,, | Performed by: STUDENT IN AN ORGANIZED HEALTH CARE EDUCATION/TRAINING PROGRAM

## 2022-01-01 PROCEDURE — 38720 REMOVAL OF LYMPH NODES NECK: CPT | Mod: RT,,, | Performed by: OTOLARYNGOLOGY

## 2022-01-01 PROCEDURE — 88305 TISSUE EXAM BY PATHOLOGIST: ICD-10-PCS | Mod: 26,,, | Performed by: PATHOLOGY

## 2022-01-01 PROCEDURE — 36415 COLL VENOUS BLD VENIPUNCTURE: CPT | Performed by: UROLOGY

## 2022-01-01 PROCEDURE — 4010F ACE/ARB THERAPY RXD/TAKEN: CPT | Mod: CPTII,S$GLB,, | Performed by: OTOLARYNGOLOGY

## 2022-01-01 PROCEDURE — 4010F PR ACE/ARB THEARPY RXD/TAKEN: ICD-10-PCS | Mod: CPTII,95,, | Performed by: STUDENT IN AN ORGANIZED HEALTH CARE EDUCATION/TRAINING PROGRAM

## 2022-01-01 PROCEDURE — 99999 PR PBB SHADOW E&M-EST. PATIENT-LVL III: ICD-10-PCS | Mod: PBBFAC,,, | Performed by: PODIATRIST

## 2022-01-01 PROCEDURE — 87502 INFLUENZA DNA AMP PROBE: CPT

## 2022-01-01 PROCEDURE — 3074F SYST BP LT 130 MM HG: CPT | Mod: CPTII,S$GLB,, | Performed by: NURSE PRACTITIONER

## 2022-01-01 PROCEDURE — 99999 PR PBB SHADOW E&M-EST. PATIENT-LVL IV: ICD-10-PCS | Mod: PBBFAC,,, | Performed by: INTERNAL MEDICINE

## 2022-01-01 PROCEDURE — 3074F PR MOST RECENT SYSTOLIC BLOOD PRESSURE < 130 MM HG: ICD-10-PCS | Mod: CPTII,S$GLB,, | Performed by: STUDENT IN AN ORGANIZED HEALTH CARE EDUCATION/TRAINING PROGRAM

## 2022-01-01 PROCEDURE — 1100F PTFALLS ASSESS-DOCD GE2>/YR: CPT | Mod: CPTII,95,, | Performed by: INTERNAL MEDICINE

## 2022-01-01 PROCEDURE — 99999 PR PBB SHADOW E&M-EST. PATIENT-LVL III: CPT | Mod: PBBFAC,GC,, | Performed by: STUDENT IN AN ORGANIZED HEALTH CARE EDUCATION/TRAINING PROGRAM

## 2022-01-01 PROCEDURE — 1157F ADVNC CARE PLAN IN RCRD: CPT | Mod: CPTII,S$GLB,, | Performed by: OTOLARYNGOLOGY

## 2022-01-01 PROCEDURE — 99999 PR PBB SHADOW E&M-EST. PATIENT-LVL II: CPT | Mod: PBBFAC,,, | Performed by: OPTOMETRIST

## 2022-01-01 PROCEDURE — 4010F ACE/ARB THERAPY RXD/TAKEN: CPT | Mod: CPTII,,, | Performed by: PODIATRIST

## 2022-01-01 PROCEDURE — 88309 TISSUE EXAM BY PATHOLOGIST: CPT | Performed by: PATHOLOGY

## 2022-01-01 PROCEDURE — 3066F NEPHROPATHY DOC TX: CPT | Mod: CPTII,S$GLB,, | Performed by: DERMATOLOGY

## 2022-01-01 PROCEDURE — 3074F PR MOST RECENT SYSTOLIC BLOOD PRESSURE < 130 MM HG: ICD-10-PCS | Mod: CPTII,95,, | Performed by: INTERNAL MEDICINE

## 2022-01-01 PROCEDURE — 85652 RBC SED RATE AUTOMATED: CPT | Performed by: EMERGENCY MEDICINE

## 2022-01-01 PROCEDURE — 99214 OFFICE O/P EST MOD 30 MIN: CPT | Mod: S$GLB,,, | Performed by: UROLOGY

## 2022-01-01 PROCEDURE — 1101F PR PT FALLS ASSESS DOC 0-1 FALLS W/OUT INJ PAST YR: ICD-10-PCS | Mod: CPTII,,, | Performed by: PODIATRIST

## 2022-01-01 PROCEDURE — 36415 COLL VENOUS BLD VENIPUNCTURE: CPT | Performed by: FAMILY MEDICINE

## 2022-01-01 PROCEDURE — 17999 PR NON-COVERED FOOT CARE: ICD-10-PCS | Mod: CSM,S$GLB,, | Performed by: PODIATRIST

## 2022-01-01 PROCEDURE — 1125F AMNT PAIN NOTED PAIN PRSNT: CPT | Mod: CPTII,S$GLB,, | Performed by: STUDENT IN AN ORGANIZED HEALTH CARE EDUCATION/TRAINING PROGRAM

## 2022-01-01 PROCEDURE — 1100F PTFALLS ASSESS-DOCD GE2>/YR: CPT | Mod: CPTII,S$GLB,, | Performed by: OPTOMETRIST

## 2022-01-01 PROCEDURE — 81000 URINALYSIS NONAUTO W/SCOPE: CPT | Performed by: PHYSICIAN ASSISTANT

## 2022-01-01 PROCEDURE — 1125F PR PAIN SEVERITY QUANTIFIED, PAIN PRESENT: ICD-10-PCS | Mod: CPTII,S$GLB,, | Performed by: PODIATRIST

## 2022-01-01 PROCEDURE — 99214 OFFICE O/P EST MOD 30 MIN: CPT | Mod: 25,S$GLB,, | Performed by: OTOLARYNGOLOGY

## 2022-01-01 PROCEDURE — 4010F ACE/ARB THERAPY RXD/TAKEN: CPT | Mod: CPTII,95,, | Performed by: OTOLARYNGOLOGY

## 2022-01-01 PROCEDURE — 3066F NEPHROPATHY DOC TX: CPT | Mod: CPTII,95,, | Performed by: STUDENT IN AN ORGANIZED HEALTH CARE EDUCATION/TRAINING PROGRAM

## 2022-01-01 PROCEDURE — 96365 THER/PROPH/DIAG IV INF INIT: CPT

## 2022-01-01 PROCEDURE — 36000709 HC OR TIME LEV III EA ADD 15 MIN: Performed by: OTOLARYNGOLOGY

## 2022-01-01 PROCEDURE — 1126F PR PAIN SEVERITY QUANTIFIED, NO PAIN PRESENT: ICD-10-PCS | Mod: CPTII,S$GLB,, | Performed by: OPTOMETRIST

## 2022-01-01 PROCEDURE — D9220A PRA ANESTHESIA: Mod: ANES,,, | Performed by: SURGERY

## 2022-01-01 PROCEDURE — 87811 SARS CORONAVIRUS 2 ANTIGEN POCT, MANUAL READ: ICD-10-PCS | Mod: GC,S$GLB,, | Performed by: STUDENT IN AN ORGANIZED HEALTH CARE EDUCATION/TRAINING PROGRAM

## 2022-01-01 PROCEDURE — 88173 PR  INTERPRETATION OF FNA SMEAR: ICD-10-PCS | Mod: 26,,, | Performed by: PATHOLOGY

## 2022-01-01 PROCEDURE — 3066F PR DOCUMENTATION OF TREATMENT FOR NEPHROPATHY: ICD-10-PCS | Mod: CPTII,95,, | Performed by: STUDENT IN AN ORGANIZED HEALTH CARE EDUCATION/TRAINING PROGRAM

## 2022-01-01 PROCEDURE — 78815 PET IMAGE W/CT SKULL-THIGH: CPT | Mod: 26,PI,, | Performed by: RADIOLOGY

## 2022-01-01 PROCEDURE — 1111F PR DISCHARGE MEDS RECONCILED W/ CURRENT OUTPATIENT MED LIST: ICD-10-PCS | Mod: CPTII,S$GLB,, | Performed by: OTOLARYNGOLOGY

## 2022-01-01 PROCEDURE — 1125F AMNT PAIN NOTED PAIN PRSNT: CPT | Mod: CPTII,S$GLB,, | Performed by: PODIATRIST

## 2022-01-01 PROCEDURE — 1101F PT FALLS ASSESS-DOCD LE1/YR: CPT | Mod: CPTII,S$GLB,, | Performed by: SURGERY

## 2022-01-01 PROCEDURE — 1111F PR DISCHARGE MEDS RECONCILED W/ CURRENT OUTPATIENT MED LIST: ICD-10-PCS | Mod: CPTII,S$GLB,, | Performed by: DERMATOLOGY

## 2022-01-01 PROCEDURE — 99213 OFFICE O/P EST LOW 20 MIN: CPT | Mod: S$GLB,,,

## 2022-01-01 PROCEDURE — 86850 RBC ANTIBODY SCREEN: CPT

## 2022-01-01 PROCEDURE — 3288F PR FALLS RISK ASSESSMENT DOCUMENTED: ICD-10-PCS | Mod: CPTII,S$GLB,, | Performed by: OPTOMETRIST

## 2022-01-01 PROCEDURE — 87040 BLOOD CULTURE FOR BACTERIA: CPT | Mod: 59 | Performed by: STUDENT IN AN ORGANIZED HEALTH CARE EDUCATION/TRAINING PROGRAM

## 2022-01-01 PROCEDURE — 87088 URINE BACTERIA CULTURE: CPT | Performed by: NURSE PRACTITIONER

## 2022-01-01 PROCEDURE — 99212 OFFICE O/P EST SF 10 MIN: CPT | Mod: 25,S$GLB,, | Performed by: DERMATOLOGY

## 2022-01-01 PROCEDURE — 1100F PR PT FALLS ASSESS DOC 2+ FALLS/FALL W/INJURY/YR: ICD-10-PCS | Mod: CPTII,95,, | Performed by: INTERNAL MEDICINE

## 2022-01-01 PROCEDURE — 99497 PR ADVNCD CARE PLAN 30 MIN: ICD-10-PCS | Mod: S$GLB,,, | Performed by: STUDENT IN AN ORGANIZED HEALTH CARE EDUCATION/TRAINING PROGRAM

## 2022-01-01 PROCEDURE — 88333 PATH CONSLTJ SURG CYTO XM 1: CPT | Performed by: PATHOLOGY

## 2022-01-01 PROCEDURE — 99214 PR OFFICE/OUTPT VISIT, EST, LEVL IV, 30-39 MIN: ICD-10-PCS | Mod: 95,,, | Performed by: OTOLARYNGOLOGY

## 2022-01-01 PROCEDURE — 4010F ACE/ARB THERAPY RXD/TAKEN: CPT | Mod: CPTII,S$GLB,,

## 2022-01-01 PROCEDURE — 3078F PR MOST RECENT DIASTOLIC BLOOD PRESSURE < 80 MM HG: ICD-10-PCS | Mod: CPTII,S$GLB,, | Performed by: OTOLARYNGOLOGY

## 2022-01-01 PROCEDURE — 71250 CT THORAX DX C-: CPT | Mod: TC

## 2022-01-01 PROCEDURE — 17003 DESTRUCTION, PREMALIGNANT LESIONS; SECOND THROUGH 14 LESIONS: ICD-10-PCS | Mod: S$GLB,,, | Performed by: DERMATOLOGY

## 2022-01-01 PROCEDURE — 99213 PR OFFICE/OUTPT VISIT, EST, LEVL III, 20-29 MIN: ICD-10-PCS | Mod: S$GLB,,,

## 2022-01-01 PROCEDURE — 99499 UNLISTED E&M SERVICE: CPT | Mod: S$GLB,,, | Performed by: OTOLARYNGOLOGY

## 2022-01-01 PROCEDURE — 1126F PR PAIN SEVERITY QUANTIFIED, NO PAIN PRESENT: ICD-10-PCS | Mod: CPTII,S$GLB,, | Performed by: STUDENT IN AN ORGANIZED HEALTH CARE EDUCATION/TRAINING PROGRAM

## 2022-01-01 PROCEDURE — 1159F PR MEDICATION LIST DOCUMENTED IN MEDICAL RECORD: ICD-10-PCS | Mod: CPTII,95,, | Performed by: INTERNAL MEDICINE

## 2022-01-01 PROCEDURE — 99205 PR OFFICE/OUTPT VISIT, NEW, LEVL V, 60-74 MIN: ICD-10-PCS | Mod: S$GLB,,, | Performed by: STUDENT IN AN ORGANIZED HEALTH CARE EDUCATION/TRAINING PROGRAM

## 2022-01-01 PROCEDURE — U0002 COVID-19 LAB TEST NON-CDC: HCPCS | Performed by: PHYSICIAN ASSISTANT

## 2022-01-01 PROCEDURE — 99999 PR PBB SHADOW E&M-EST. PATIENT-LVL V: ICD-10-PCS | Mod: PBBFAC,,, | Performed by: INTERNAL MEDICINE

## 2022-01-01 PROCEDURE — 4010F ACE/ARB THERAPY RXD/TAKEN: CPT | Mod: CPTII,S$GLB,, | Performed by: DERMATOLOGY

## 2022-01-01 PROCEDURE — 1126F AMNT PAIN NOTED NONE PRSNT: CPT | Mod: CPTII,S$GLB,, | Performed by: SURGERY

## 2022-01-01 PROCEDURE — 83605 ASSAY OF LACTIC ACID: CPT | Performed by: EMERGENCY MEDICINE

## 2022-01-01 PROCEDURE — 1125F AMNT PAIN NOTED PAIN PRSNT: CPT | Mod: CPTII,S$GLB,, | Performed by: NURSE PRACTITIONER

## 2022-01-01 PROCEDURE — 88312 PR  SPECIAL STAINS,GROUP I: ICD-10-PCS | Mod: 26,,, | Performed by: PATHOLOGY

## 2022-01-01 PROCEDURE — 88173 CYTOPATH EVAL FNA REPORT: CPT | Performed by: PATHOLOGY

## 2022-01-01 PROCEDURE — 3066F NEPHROPATHY DOC TX: CPT | Mod: CPTII,S$GLB,, | Performed by: PODIATRIST

## 2022-01-01 PROCEDURE — D9220A PRA ANESTHESIA: Mod: CRNA,,, | Performed by: NURSE ANESTHETIST, CERTIFIED REGISTERED

## 2022-01-01 PROCEDURE — 99213 PR OFFICE/OUTPT VISIT, EST, LEVL III, 20-29 MIN: ICD-10-PCS | Mod: S$GLB,,, | Performed by: PODIATRIST

## 2022-01-01 PROCEDURE — 1160F PR REVIEW ALL MEDS BY PRESCRIBER/CLIN PHARMACIST DOCUMENTED: ICD-10-PCS | Mod: CPTII,S$GLB,, | Performed by: UROLOGY

## 2022-01-01 PROCEDURE — 99285 EMERGENCY DEPT VISIT HI MDM: CPT | Mod: 25

## 2022-01-01 PROCEDURE — 94618 PULMONARY STRESS TESTING: ICD-10-PCS | Mod: S$GLB,,, | Performed by: INTERNAL MEDICINE

## 2022-01-01 PROCEDURE — 83735 ASSAY OF MAGNESIUM: CPT | Performed by: PHYSICIAN ASSISTANT

## 2022-01-01 PROCEDURE — 80053 COMPREHEN METABOLIC PANEL: CPT | Performed by: OTOLARYNGOLOGY

## 2022-01-01 PROCEDURE — 82962 POCT GLUCOSE, HAND-HELD DEVICE: ICD-10-PCS | Mod: S$GLB,,,

## 2022-01-01 PROCEDURE — 82728 ASSAY OF FERRITIN: CPT | Performed by: EMERGENCY MEDICINE

## 2022-01-01 PROCEDURE — 17000 PR DESTRUCTION(LASER SURGERY,CRYOSURGERY,CHEMOSURGERY),PREMALIGNANT LESIONS,FIRST LESION: ICD-10-PCS | Mod: S$GLB,,, | Performed by: DERMATOLOGY

## 2022-01-01 PROCEDURE — 36561 INSERT TUNNELED CV CATH: CPT | Mod: RT,,, | Performed by: RADIOLOGY

## 2022-01-01 PROCEDURE — 88305 TISSUE EXAM BY PATHOLOGIST: CPT | Mod: 26,,, | Performed by: PATHOLOGY

## 2022-01-01 PROCEDURE — 1157F PR ADVANCE CARE PLAN OR EQUIV PRESENT IN MEDICAL RECORD: ICD-10-PCS | Mod: CPTII,S$GLB,, | Performed by: UROLOGY

## 2022-01-01 PROCEDURE — 87070 CULTURE OTHR SPECIMN AEROBIC: CPT | Performed by: STUDENT IN AN ORGANIZED HEALTH CARE EDUCATION/TRAINING PROGRAM

## 2022-01-01 PROCEDURE — 1159F MED LIST DOCD IN RCRD: CPT | Mod: CPTII,,, | Performed by: PODIATRIST

## 2022-01-01 PROCEDURE — 3288F FALL RISK ASSESSMENT DOCD: CPT | Mod: CPTII,,, | Performed by: PODIATRIST

## 2022-01-01 PROCEDURE — 3008F PR BODY MASS INDEX (BMI) DOCUMENTED: ICD-10-PCS | Mod: CPTII,S$GLB,, | Performed by: INTERNAL MEDICINE

## 2022-01-01 PROCEDURE — 94618 PULMONARY STRESS TESTING: CPT | Mod: S$GLB,,, | Performed by: INTERNAL MEDICINE

## 2022-01-01 PROCEDURE — 3066F PR DOCUMENTATION OF TREATMENT FOR NEPHROPATHY: ICD-10-PCS | Mod: CPTII,95,, | Performed by: INTERNAL MEDICINE

## 2022-01-01 PROCEDURE — 1126F AMNT PAIN NOTED NONE PRSNT: CPT | Mod: CPTII,S$GLB,, | Performed by: NURSE PRACTITIONER

## 2022-01-01 PROCEDURE — 1157F PR ADVANCE CARE PLAN OR EQUIV PRESENT IN MEDICAL RECORD: ICD-10-PCS | Mod: CPTII,S$GLB,,

## 2022-01-01 PROCEDURE — 85610 PROTHROMBIN TIME: CPT | Performed by: STUDENT IN AN ORGANIZED HEALTH CARE EDUCATION/TRAINING PROGRAM

## 2022-01-01 PROCEDURE — 1159F PR MEDICATION LIST DOCUMENTED IN MEDICAL RECORD: ICD-10-PCS | Mod: CPTII,S$GLB,,

## 2022-01-01 PROCEDURE — 99213 OFFICE O/P EST LOW 20 MIN: CPT | Mod: 25,S$GLB,, | Performed by: DERMATOLOGY

## 2022-01-01 PROCEDURE — 1101F PT FALLS ASSESS-DOCD LE1/YR: CPT | Mod: CPTII,S$GLB,, | Performed by: OPTOMETRIST

## 2022-01-01 PROCEDURE — 99999 PR PBB SHADOW E&M-EST. PATIENT-LVL III: CPT | Mod: PBBFAC,,, | Performed by: NURSE PRACTITIONER

## 2022-01-01 PROCEDURE — 87086 URINE CULTURE/COLONY COUNT: CPT | Performed by: PHYSICIAN ASSISTANT

## 2022-01-01 PROCEDURE — 80053 COMPREHEN METABOLIC PANEL: CPT | Performed by: PHYSICIAN ASSISTANT

## 2022-01-01 PROCEDURE — 1101F PR PT FALLS ASSESS DOC 0-1 FALLS W/OUT INJ PAST YR: ICD-10-PCS | Mod: CPTII,S$GLB,, | Performed by: UROLOGY

## 2022-01-01 PROCEDURE — 1157F ADVNC CARE PLAN IN RCRD: CPT | Mod: CPTII,95,, | Performed by: STUDENT IN AN ORGANIZED HEALTH CARE EDUCATION/TRAINING PROGRAM

## 2022-01-01 PROCEDURE — 99215 OFFICE O/P EST HI 40 MIN: CPT | Mod: S$GLB,,, | Performed by: INTERNAL MEDICINE

## 2022-01-01 PROCEDURE — 99204 OFFICE O/P NEW MOD 45 MIN: CPT | Mod: S$GLB,,, | Performed by: SURGERY

## 2022-01-01 PROCEDURE — 1160F PR REVIEW ALL MEDS BY PRESCRIBER/CLIN PHARMACIST DOCUMENTED: ICD-10-PCS | Mod: CPTII,,, | Performed by: PODIATRIST

## 2022-01-01 PROCEDURE — 96360 HYDRATION IV INFUSION INIT: CPT

## 2022-01-01 PROCEDURE — 1159F MED LIST DOCD IN RCRD: CPT | Mod: CPTII,S$GLB,,

## 2022-01-01 PROCEDURE — 99397 PR PREVENTIVE VISIT,EST,65 & OVER: ICD-10-PCS | Mod: S$GLB,,, | Performed by: INTERNAL MEDICINE

## 2022-01-01 PROCEDURE — 99417 PROLNG OP E/M EACH 15 MIN: CPT | Mod: S$GLB,,, | Performed by: NURSE PRACTITIONER

## 2022-01-01 PROCEDURE — 1157F PR ADVANCE CARE PLAN OR EQUIV PRESENT IN MEDICAL RECORD: ICD-10-PCS | Mod: CPTII,S$GLB,, | Performed by: SURGERY

## 2022-01-01 PROCEDURE — 87449 NOS EACH ORGANISM AG IA: CPT | Performed by: EMERGENCY MEDICINE

## 2022-01-01 PROCEDURE — 88184 FLOWCYTOMETRY/ TC 1 MARKER: CPT | Performed by: PATHOLOGY

## 2022-01-01 PROCEDURE — 1157F PR ADVANCE CARE PLAN OR EQUIV PRESENT IN MEDICAL RECORD: ICD-10-PCS | Mod: CPTII,S$GLB,, | Performed by: FAMILY MEDICINE

## 2022-01-01 PROCEDURE — 3288F FALL RISK ASSESSMENT DOCD: CPT | Mod: CPTII,95,, | Performed by: INTERNAL MEDICINE

## 2022-01-01 PROCEDURE — 3288F PR FALLS RISK ASSESSMENT DOCUMENTED: ICD-10-PCS | Mod: CPTII,S$GLB,, | Performed by: SURGERY

## 2022-01-01 PROCEDURE — 99999 PR PBB SHADOW E&M-EST. PATIENT-LVL V: CPT | Mod: PBBFAC,,, | Performed by: PODIATRIST

## 2022-01-01 PROCEDURE — 99499 NO LOS: ICD-10-PCS | Mod: S$GLB,,, | Performed by: OTOLARYNGOLOGY

## 2022-01-01 PROCEDURE — 99499 RISK ADDL DX/OHS AUDIT: ICD-10-PCS | Mod: 95,,, | Performed by: INTERNAL MEDICINE

## 2022-01-01 PROCEDURE — 99223 1ST HOSP IP/OBS HIGH 75: CPT | Mod: GC,,, | Performed by: INTERNAL MEDICINE

## 2022-01-01 PROCEDURE — 1157F PR ADVANCE CARE PLAN OR EQUIV PRESENT IN MEDICAL RECORD: ICD-10-PCS | Mod: CPTII,S$GLB,, | Performed by: DERMATOLOGY

## 2022-01-01 PROCEDURE — 3066F NEPHROPATHY DOC TX: CPT | Mod: CPTII,S$GLB,, | Performed by: SURGERY

## 2022-01-01 PROCEDURE — 3288F PR FALLS RISK ASSESSMENT DOCUMENTED: ICD-10-PCS | Mod: CPTII,,, | Performed by: PODIATRIST

## 2022-01-01 PROCEDURE — 99999 PR PBB SHADOW E&M-EST. PATIENT-LVL IV: CPT | Mod: PBBFAC,,, | Performed by: INTERNAL MEDICINE

## 2022-01-01 PROCEDURE — 1160F PR REVIEW ALL MEDS BY PRESCRIBER/CLIN PHARMACIST DOCUMENTED: ICD-10-PCS | Mod: CPTII,S$GLB,,

## 2022-01-01 PROCEDURE — 99999 PR PBB SHADOW E&M-EST. PATIENT-LVL V: ICD-10-PCS | Mod: PBBFAC,,, | Performed by: PODIATRIST

## 2022-01-01 PROCEDURE — 99999 PR PBB SHADOW E&M-EST. PATIENT-LVL IV: CPT | Mod: PBBFAC,,, | Performed by: DERMATOLOGY

## 2022-01-01 PROCEDURE — 1159F PR MEDICATION LIST DOCUMENTED IN MEDICAL RECORD: ICD-10-PCS | Mod: CPTII,S$GLB,, | Performed by: SURGERY

## 2022-01-01 PROCEDURE — 88173 CYTOPATH EVAL FNA REPORT: CPT | Mod: 26,,, | Performed by: PATHOLOGY

## 2022-01-01 PROCEDURE — 1100F PR PT FALLS ASSESS DOC 2+ FALLS/FALL W/INJURY/YR: ICD-10-PCS | Mod: CPTII,S$GLB,, | Performed by: OPTOMETRIST

## 2022-01-01 PROCEDURE — 99205 OFFICE O/P NEW HI 60 MIN: CPT | Mod: S$GLB,,, | Performed by: STUDENT IN AN ORGANIZED HEALTH CARE EDUCATION/TRAINING PROGRAM

## 2022-01-01 PROCEDURE — 88342 IMHCHEM/IMCYTCHM 1ST ANTB: CPT | Performed by: PATHOLOGY

## 2022-01-01 PROCEDURE — 88189 PR  FLOWCYTOMETRY/READ, 16 & > MARKERS: ICD-10-PCS | Mod: ,,, | Performed by: PATHOLOGY

## 2022-01-01 PROCEDURE — 88342 IMHCHEM/IMCYTCHM 1ST ANTB: CPT | Mod: 26,,, | Performed by: PATHOLOGY

## 2022-01-01 PROCEDURE — 87493 C DIFF AMPLIFIED PROBE: CPT | Performed by: EMERGENCY MEDICINE

## 2022-01-01 PROCEDURE — 36415 COLL VENOUS BLD VENIPUNCTURE: CPT | Mod: PO | Performed by: NURSE PRACTITIONER

## 2022-01-01 PROCEDURE — 86140 C-REACTIVE PROTEIN: CPT | Performed by: EMERGENCY MEDICINE

## 2022-01-01 PROCEDURE — 99291 CRITICAL CARE FIRST HOUR: CPT | Mod: 25,,, | Performed by: INTERNAL MEDICINE

## 2022-01-01 PROCEDURE — 94729 PR C02/MEMBANE DIFFUSE CAPACITY: ICD-10-PCS | Mod: S$GLB,,, | Performed by: INTERNAL MEDICINE

## 2022-01-01 PROCEDURE — P9016 RBC LEUKOCYTES REDUCED: HCPCS | Performed by: HOSPITALIST

## 2022-01-01 PROCEDURE — 85520 HEPARIN ASSAY: CPT | Mod: 91

## 2022-01-01 PROCEDURE — 70491 CT SOFT TISSUE NECK W/DYE: CPT | Mod: 26,,, | Performed by: RADIOLOGY

## 2022-01-01 PROCEDURE — 96361 HYDRATE IV INFUSION ADD-ON: CPT | Mod: 59

## 2022-01-01 PROCEDURE — 99233 PR SUBSEQUENT HOSPITAL CARE,LEVL III: ICD-10-PCS | Mod: GC,,, | Performed by: STUDENT IN AN ORGANIZED HEALTH CARE EDUCATION/TRAINING PROGRAM

## 2022-01-01 PROCEDURE — 99214 OFFICE O/P EST MOD 30 MIN: CPT | Mod: GC,S$GLB,, | Performed by: STUDENT IN AN ORGANIZED HEALTH CARE EDUCATION/TRAINING PROGRAM

## 2022-01-01 PROCEDURE — 85260 CLOT FACTOR X STUART-POWER: CPT

## 2022-01-01 PROCEDURE — 4010F ACE/ARB THERAPY RXD/TAKEN: CPT | Mod: CPTII,S$GLB,, | Performed by: SURGERY

## 2022-01-01 PROCEDURE — 1100F PTFALLS ASSESS-DOCD GE2>/YR: CPT | Mod: CPTII,S$GLB,, | Performed by: NURSE PRACTITIONER

## 2022-01-01 PROCEDURE — 96374 THER/PROPH/DIAG INJ IV PUSH: CPT

## 2022-01-01 PROCEDURE — 63600175 PHARM REV CODE 636 W HCPCS: Performed by: FAMILY MEDICINE

## 2022-01-01 PROCEDURE — 3074F SYST BP LT 130 MM HG: CPT | Mod: CPTII,95,, | Performed by: INTERNAL MEDICINE

## 2022-01-01 PROCEDURE — 99999 PR PBB SHADOW E&M-EST. PATIENT-LVL IV: ICD-10-PCS | Mod: PBBFAC,,, | Performed by: DERMATOLOGY

## 2022-01-01 PROCEDURE — 1160F RVW MEDS BY RX/DR IN RCRD: CPT | Mod: CPTII,S$GLB,, | Performed by: DERMATOLOGY

## 2022-01-01 PROCEDURE — 69210 REMOVE IMPACTED EAR WAX UNI: CPT | Mod: S$GLB,,, | Performed by: OTOLARYNGOLOGY

## 2022-01-01 PROCEDURE — 4010F PR ACE/ARB THEARPY RXD/TAKEN: ICD-10-PCS | Mod: CPTII,S$GLB,,

## 2022-01-01 PROCEDURE — 1125F PR PAIN SEVERITY QUANTIFIED, PAIN PRESENT: ICD-10-PCS | Mod: CPTII,S$GLB,, | Performed by: OTOLARYNGOLOGY

## 2022-01-01 PROCEDURE — 10005 PR FINE NEEDLE ASP BIOPSY, W/US GUIDANCE, 1ST LESION: ICD-10-PCS | Mod: S$GLB,,, | Performed by: OTOLARYNGOLOGY

## 2022-01-01 PROCEDURE — 3074F SYST BP LT 130 MM HG: CPT | Mod: CPTII,S$GLB,, | Performed by: OTOLARYNGOLOGY

## 2022-01-01 PROCEDURE — 1125F AMNT PAIN NOTED PAIN PRSNT: CPT | Mod: CPTII,S$GLB,, | Performed by: OTOLARYNGOLOGY

## 2022-01-01 PROCEDURE — 99999 PR PBB SHADOW E&M-EST. PATIENT-LVL III: ICD-10-PCS | Mod: PBBFAC,GC,, | Performed by: STUDENT IN AN ORGANIZED HEALTH CARE EDUCATION/TRAINING PROGRAM

## 2022-01-01 PROCEDURE — 38720 PR REMOVAL NODES, NECK,CERV CMPLT: ICD-10-PCS | Mod: RT,,, | Performed by: OTOLARYNGOLOGY

## 2022-01-01 PROCEDURE — 3066F PR DOCUMENTATION OF TREATMENT FOR NEPHROPATHY: ICD-10-PCS | Mod: CPTII,S$GLB,, | Performed by: SURGERY

## 2022-01-01 PROCEDURE — 3078F DIAST BP <80 MM HG: CPT | Mod: CPTII,S$GLB,, | Performed by: OTOLARYNGOLOGY

## 2022-01-01 PROCEDURE — 63600175 PHARM REV CODE 636 W HCPCS: Performed by: NURSE ANESTHETIST, CERTIFIED REGISTERED

## 2022-01-01 PROCEDURE — 92014 PR EYE EXAM, EST PATIENT,COMPREHESV: ICD-10-PCS | Mod: S$GLB,,, | Performed by: OPTOMETRIST

## 2022-01-01 PROCEDURE — 80048 BASIC METABOLIC PNL TOTAL CA: CPT | Performed by: NURSE PRACTITIONER

## 2022-01-01 PROCEDURE — 84466 ASSAY OF TRANSFERRIN: CPT | Performed by: EMERGENCY MEDICINE

## 2022-01-01 PROCEDURE — 3288F PR FALLS RISK ASSESSMENT DOCUMENTED: ICD-10-PCS | Mod: CPTII,S$GLB,, | Performed by: UROLOGY

## 2022-01-01 PROCEDURE — 85520 HEPARIN ASSAY: CPT

## 2022-01-01 PROCEDURE — 1159F PR MEDICATION LIST DOCUMENTED IN MEDICAL RECORD: ICD-10-PCS | Mod: CPTII,,, | Performed by: PODIATRIST

## 2022-01-01 PROCEDURE — 37000008 HC ANESTHESIA 1ST 15 MINUTES

## 2022-01-01 PROCEDURE — 76536 US EXAM OF HEAD AND NECK: CPT | Mod: TC

## 2022-01-01 PROCEDURE — 84484 ASSAY OF TROPONIN QUANT: CPT | Performed by: PHYSICIAN ASSISTANT

## 2022-01-01 PROCEDURE — 17999 UNLISTD PX SKN MUC MEMB SUBQ: CPT | Mod: CSM,S$GLB,, | Performed by: PODIATRIST

## 2022-01-01 PROCEDURE — 30200315 PPD INTRADERMAL TEST REV CODE 302

## 2022-01-01 PROCEDURE — 99213 PR OFFICE/OUTPT VISIT, EST, LEVL III, 20-29 MIN: ICD-10-PCS | Mod: S$GLB,,, | Performed by: NURSE PRACTITIONER

## 2022-01-01 PROCEDURE — 4010F PR ACE/ARB THEARPY RXD/TAKEN: ICD-10-PCS | Mod: CPTII,S$GLB,, | Performed by: PODIATRIST

## 2022-01-01 PROCEDURE — 1160F RVW MEDS BY RX/DR IN RCRD: CPT | Mod: CPTII,S$GLB,, | Performed by: SURGERY

## 2022-01-01 PROCEDURE — 77001 FLUOROGUIDE FOR VEIN DEVICE: CPT | Mod: 26,,, | Performed by: RADIOLOGY

## 2022-01-01 PROCEDURE — 1159F PR MEDICATION LIST DOCUMENTED IN MEDICAL RECORD: ICD-10-PCS | Mod: CPTII,95,, | Performed by: STUDENT IN AN ORGANIZED HEALTH CARE EDUCATION/TRAINING PROGRAM

## 2022-01-01 PROCEDURE — 99213 PR OFFICE/OUTPT VISIT, EST, LEVL III, 20-29 MIN: ICD-10-PCS | Mod: 25,S$GLB,, | Performed by: DERMATOLOGY

## 2022-01-01 PROCEDURE — 99397 PER PM REEVAL EST PAT 65+ YR: CPT | Mod: S$GLB,,, | Performed by: INTERNAL MEDICINE

## 2022-01-01 PROCEDURE — 11102 PR TANGENTIAL BIOPSY, SKIN, SINGLE LESION: ICD-10-PCS | Mod: S$GLB,,, | Performed by: DERMATOLOGY

## 2022-01-01 RX ORDER — HEPARIN 100 UNIT/ML
SYRINGE INTRAVENOUS CODE/TRAUMA/SEDATION MEDICATION
Status: DISCONTINUED | OUTPATIENT
Start: 2022-01-01 | End: 2022-01-01 | Stop reason: HOSPADM

## 2022-01-01 RX ORDER — ENOXAPARIN SODIUM 150 MG/ML
1 INJECTION SUBCUTANEOUS
Status: DISCONTINUED | OUTPATIENT
Start: 2022-01-01 | End: 2022-01-01

## 2022-01-01 RX ORDER — HYDROCODONE BITARTRATE AND ACETAMINOPHEN 7.5; 325 MG/1; MG/1
1 TABLET ORAL EVERY 6 HOURS PRN
Status: DISCONTINUED | OUTPATIENT
Start: 2022-01-01 | End: 2022-01-01 | Stop reason: HOSPADM

## 2022-01-01 RX ORDER — HEPARIN 100 UNIT/ML
500 SYRINGE INTRAVENOUS
Status: CANCELLED | OUTPATIENT
Start: 2022-01-01

## 2022-01-01 RX ORDER — SODIUM CHLORIDE 0.9 % (FLUSH) 0.9 %
10 SYRINGE (ML) INJECTION
Status: CANCELLED | OUTPATIENT
Start: 2022-01-01

## 2022-01-01 RX ORDER — PANTOPRAZOLE SODIUM 40 MG/1
40 TABLET, DELAYED RELEASE ORAL DAILY
Qty: 30 TABLET | Refills: 11 | Status: SHIPPED | OUTPATIENT
Start: 2022-01-01 | End: 2022-01-01 | Stop reason: SDUPTHER

## 2022-01-01 RX ORDER — ROCURONIUM BROMIDE 10 MG/ML
INJECTION, SOLUTION INTRAVENOUS
Status: DISCONTINUED | OUTPATIENT
Start: 2022-01-01 | End: 2022-01-01

## 2022-01-01 RX ORDER — METOPROLOL SUCCINATE 25 MG/1
25 TABLET, EXTENDED RELEASE ORAL DAILY
Status: DISCONTINUED | OUTPATIENT
Start: 2022-01-01 | End: 2022-01-01 | Stop reason: HOSPADM

## 2022-01-01 RX ORDER — ATORVASTATIN CALCIUM 40 MG/1
40 TABLET, FILM COATED ORAL NIGHTLY
Status: DISCONTINUED | OUTPATIENT
Start: 2022-01-01 | End: 2022-01-01 | Stop reason: HOSPADM

## 2022-01-01 RX ORDER — ACETAMINOPHEN 500 MG
1000 TABLET ORAL
Status: DISCONTINUED | OUTPATIENT
Start: 2022-01-01 | End: 2022-01-01 | Stop reason: HOSPADM

## 2022-01-01 RX ORDER — LIDOCAINE HYDROCHLORIDE 10 MG/ML
INJECTION INFILTRATION; PERINEURAL CODE/TRAUMA/SEDATION MEDICATION
Status: COMPLETED | OUTPATIENT
Start: 2022-01-01 | End: 2022-01-01

## 2022-01-01 RX ORDER — SILVER SULFADIAZINE 10 G/1000G
CREAM TOPICAL
COMMUNITY
Start: 2022-01-01

## 2022-01-01 RX ORDER — SODIUM CHLORIDE 0.9 % (FLUSH) 0.9 %
10 SYRINGE (ML) INJECTION
Status: DISCONTINUED | OUTPATIENT
Start: 2022-01-01 | End: 2022-01-01 | Stop reason: HOSPADM

## 2022-01-01 RX ORDER — IBUPROFEN 200 MG
16 TABLET ORAL
Status: DISCONTINUED | OUTPATIENT
Start: 2022-01-01 | End: 2022-01-01 | Stop reason: HOSPADM

## 2022-01-01 RX ORDER — MECLIZINE HCL 25MG 25 MG/1
25 TABLET, CHEWABLE ORAL
Status: COMPLETED | OUTPATIENT
Start: 2022-01-01 | End: 2022-01-01

## 2022-01-01 RX ORDER — OXYMETAZOLINE HCL 0.05 %
SPRAY, NON-AEROSOL (ML) NASAL
Status: DISCONTINUED | OUTPATIENT
Start: 2022-01-01 | End: 2022-01-01 | Stop reason: HOSPADM

## 2022-01-01 RX ORDER — FUROSEMIDE 10 MG/ML
40 INJECTION INTRAMUSCULAR; INTRAVENOUS ONCE
Status: COMPLETED | OUTPATIENT
Start: 2022-01-01 | End: 2022-01-01

## 2022-01-01 RX ORDER — AMIODARONE HYDROCHLORIDE 200 MG/1
200 TABLET ORAL DAILY
Qty: 90 TABLET | Refills: 3 | Status: SHIPPED | OUTPATIENT
Start: 2022-01-01 | End: 2023-06-23

## 2022-01-01 RX ORDER — HEPARIN 100 UNIT/ML
500 SYRINGE INTRAVENOUS
Status: DISCONTINUED | OUTPATIENT
Start: 2022-01-01 | End: 2022-01-01 | Stop reason: HOSPADM

## 2022-01-01 RX ORDER — LOSARTAN POTASSIUM 25 MG/1
50 TABLET ORAL NIGHTLY
Status: DISCONTINUED | OUTPATIENT
Start: 2022-01-01 | End: 2022-01-01 | Stop reason: HOSPADM

## 2022-01-01 RX ORDER — GLY/CARB H.POLYMR A/POT HYDROX
SOLUTION, ORAL MUCOUS MEMBRANE
Status: ON HOLD | COMMUNITY
End: 2022-01-01 | Stop reason: HOSPADM

## 2022-01-01 RX ORDER — METOPROLOL TARTRATE 1 MG/ML
5 INJECTION, SOLUTION INTRAVENOUS
Status: DISCONTINUED | OUTPATIENT
Start: 2022-01-01 | End: 2022-01-01

## 2022-01-01 RX ORDER — CLOPIDOGREL BISULFATE 75 MG/1
75 TABLET ORAL DAILY
Status: DISCONTINUED | OUTPATIENT
Start: 2022-01-01 | End: 2022-01-01

## 2022-01-01 RX ORDER — ONDANSETRON 2 MG/ML
4 INJECTION INTRAMUSCULAR; INTRAVENOUS EVERY 6 HOURS PRN
Status: DISCONTINUED | OUTPATIENT
Start: 2022-01-01 | End: 2022-01-01 | Stop reason: HOSPADM

## 2022-01-01 RX ORDER — CEFAZOLIN SODIUM 1 G/50ML
SOLUTION INTRAVENOUS
Status: COMPLETED | OUTPATIENT
Start: 2022-01-01 | End: 2022-01-01

## 2022-01-01 RX ORDER — IPRATROPIUM BROMIDE AND ALBUTEROL SULFATE 2.5; .5 MG/3ML; MG/3ML
3 SOLUTION RESPIRATORY (INHALATION) EVERY 4 HOURS PRN
Status: DISCONTINUED | OUTPATIENT
Start: 2022-01-01 | End: 2022-01-01

## 2022-01-01 RX ORDER — AMOXICILLIN 250 MG
1 CAPSULE ORAL 2 TIMES DAILY PRN
Status: DISCONTINUED | OUTPATIENT
Start: 2022-01-01 | End: 2022-01-01 | Stop reason: HOSPADM

## 2022-01-01 RX ORDER — AMOXICILLIN 250 MG
1 CAPSULE ORAL 2 TIMES DAILY PRN
Qty: 20 TABLET | Refills: 0 | Status: SHIPPED | OUTPATIENT
Start: 2022-01-01

## 2022-01-01 RX ORDER — HYDRALAZINE HYDROCHLORIDE 20 MG/ML
10 INJECTION INTRAMUSCULAR; INTRAVENOUS EVERY 6 HOURS PRN
Status: DISCONTINUED | OUTPATIENT
Start: 2022-01-01 | End: 2022-01-01 | Stop reason: HOSPADM

## 2022-01-01 RX ORDER — ONDANSETRON HYDROCHLORIDE 8 MG/1
8 TABLET, FILM COATED ORAL EVERY 8 HOURS PRN
Qty: 30 TABLET | Refills: 3 | Status: ON HOLD | OUTPATIENT
Start: 2022-01-01 | End: 2022-01-01 | Stop reason: HOSPADM

## 2022-01-01 RX ORDER — METOPROLOL SUCCINATE 50 MG/1
50 TABLET, EXTENDED RELEASE ORAL DAILY
Status: DISCONTINUED | OUTPATIENT
Start: 2022-01-01 | End: 2022-01-01 | Stop reason: HOSPADM

## 2022-01-01 RX ORDER — DEXAMETHASONE SODIUM PHOSPHATE 4 MG/ML
INJECTION, SOLUTION INTRA-ARTICULAR; INTRALESIONAL; INTRAMUSCULAR; INTRAVENOUS; SOFT TISSUE
Status: DISCONTINUED | OUTPATIENT
Start: 2022-01-01 | End: 2022-01-01

## 2022-01-01 RX ORDER — FUROSEMIDE 20 MG/1
40 TABLET ORAL DAILY
Qty: 180 TABLET | Refills: 3 | Status: SHIPPED | OUTPATIENT
Start: 2022-01-01 | End: 2023-08-17

## 2022-01-01 RX ORDER — OLMESARTAN MEDOXOMIL 5 MG/1
1 TABLET ORAL
COMMUNITY
Start: 2022-01-01 | End: 2022-01-01

## 2022-01-01 RX ORDER — HYDROCODONE BITARTRATE AND ACETAMINOPHEN 7.5; 325 MG/15ML; MG/15ML
15 SOLUTION ORAL EVERY 8 HOURS PRN
Qty: 473 ML | Refills: 0 | Status: ON HOLD | OUTPATIENT
Start: 2022-01-01 | End: 2022-01-01 | Stop reason: SDUPTHER

## 2022-01-01 RX ORDER — OXYBUTYNIN CHLORIDE 5 MG/1
5 TABLET, EXTENDED RELEASE ORAL DAILY
Status: DISCONTINUED | OUTPATIENT
Start: 2022-01-01 | End: 2022-01-01 | Stop reason: HOSPADM

## 2022-01-01 RX ORDER — VENLAFAXINE 75 MG/1
75 TABLET ORAL NIGHTLY
Status: DISCONTINUED | OUTPATIENT
Start: 2022-01-01 | End: 2022-01-01 | Stop reason: HOSPADM

## 2022-01-01 RX ORDER — MIDAZOLAM HYDROCHLORIDE 1 MG/ML
INJECTION, SOLUTION INTRAMUSCULAR; INTRAVENOUS
Status: DISCONTINUED | OUTPATIENT
Start: 2022-01-01 | End: 2022-01-01

## 2022-01-01 RX ORDER — TALC
10 POWDER (GRAM) TOPICAL NIGHTLY
Status: DISCONTINUED | OUTPATIENT
Start: 2022-01-01 | End: 2022-01-01 | Stop reason: HOSPADM

## 2022-01-01 RX ORDER — AMIODARONE HYDROCHLORIDE 200 MG/1
200 TABLET ORAL 2 TIMES DAILY
Qty: 60 TABLET | Refills: 11 | Status: SHIPPED | OUTPATIENT
Start: 2022-01-01 | End: 2022-01-01 | Stop reason: SDUPTHER

## 2022-01-01 RX ORDER — FUROSEMIDE 20 MG/1
20 TABLET ORAL DAILY
Status: DISCONTINUED | OUTPATIENT
Start: 2022-01-01 | End: 2022-01-01 | Stop reason: HOSPADM

## 2022-01-01 RX ORDER — OXYCODONE AND ACETAMINOPHEN 5; 325 MG/1; MG/1
2 TABLET ORAL EVERY 6 HOURS PRN
Status: DISPENSED | OUTPATIENT
Start: 2022-01-01 | End: 2022-01-01

## 2022-01-01 RX ORDER — CLOPIDOGREL BISULFATE 75 MG/1
75 TABLET ORAL DAILY
Status: DISCONTINUED | OUTPATIENT
Start: 2022-01-01 | End: 2022-01-01 | Stop reason: HOSPADM

## 2022-01-01 RX ORDER — AMOXICILLIN 250 MG
2 CAPSULE ORAL 2 TIMES DAILY
Status: DISCONTINUED | OUTPATIENT
Start: 2022-01-01 | End: 2022-01-01 | Stop reason: HOSPADM

## 2022-01-01 RX ORDER — DOXYCYCLINE HYCLATE 100 MG
100 TABLET ORAL
COMMUNITY
Start: 2022-01-01 | End: 2022-01-01

## 2022-01-01 RX ORDER — ENOXAPARIN SODIUM 150 MG/ML
150 INJECTION SUBCUTANEOUS
Status: DISCONTINUED | OUTPATIENT
Start: 2022-01-01 | End: 2022-01-01

## 2022-01-01 RX ORDER — FUROSEMIDE 40 MG/1
40 TABLET ORAL DAILY
Status: DISCONTINUED | OUTPATIENT
Start: 2022-01-01 | End: 2022-01-01 | Stop reason: HOSPADM

## 2022-01-01 RX ORDER — VENLAFAXINE 37.5 MG/1
150 TABLET ORAL 2 TIMES DAILY
Status: DISCONTINUED | OUTPATIENT
Start: 2022-01-01 | End: 2022-01-01 | Stop reason: HOSPADM

## 2022-01-01 RX ORDER — DOXYCYCLINE HYCLATE 100 MG
100 TABLET ORAL EVERY 12 HOURS
Qty: 10 TABLET | Refills: 0 | Status: SHIPPED | OUTPATIENT
Start: 2022-01-01 | End: 2022-01-01

## 2022-01-01 RX ORDER — SILDENAFIL 100 MG/1
100 TABLET, FILM COATED ORAL DAILY PRN
Qty: 10 TABLET | Refills: 11 | Status: ON HOLD | OUTPATIENT
Start: 2022-01-01 | End: 2022-01-01 | Stop reason: HOSPADM

## 2022-01-01 RX ORDER — HYDROMORPHONE HYDROCHLORIDE 1 MG/ML
0.2 INJECTION, SOLUTION INTRAMUSCULAR; INTRAVENOUS; SUBCUTANEOUS EVERY 5 MIN PRN
Status: DISCONTINUED | OUTPATIENT
Start: 2022-01-01 | End: 2022-01-01 | Stop reason: HOSPADM

## 2022-01-01 RX ORDER — METRONIDAZOLE 500 MG/1
500 TABLET ORAL EVERY 8 HOURS
Status: DISCONTINUED | OUTPATIENT
Start: 2022-01-01 | End: 2022-01-01

## 2022-01-01 RX ORDER — LANOLIN ALCOHOL/MO/W.PET/CERES
400 CREAM (GRAM) TOPICAL ONCE
Status: COMPLETED | OUTPATIENT
Start: 2022-01-01 | End: 2022-01-01

## 2022-01-01 RX ORDER — LIDOCAINE HYDROCHLORIDE 10 MG/ML
1 INJECTION, SOLUTION EPIDURAL; INFILTRATION; INTRACAUDAL; PERINEURAL ONCE
Status: DISCONTINUED | OUTPATIENT
Start: 2022-01-01 | End: 2022-01-01 | Stop reason: HOSPADM

## 2022-01-01 RX ORDER — CHOLECALCIFEROL (VITAMIN D3) 25 MCG
2000 TABLET ORAL DAILY
Status: DISCONTINUED | OUTPATIENT
Start: 2022-01-01 | End: 2022-01-01 | Stop reason: HOSPADM

## 2022-01-01 RX ORDER — METOPROLOL TARTRATE 1 MG/ML
2.5 INJECTION, SOLUTION INTRAVENOUS
Status: COMPLETED | OUTPATIENT
Start: 2022-01-01 | End: 2022-01-01

## 2022-01-01 RX ORDER — HEPARIN SODIUM,PORCINE/D5W 25000/250
0-40 INTRAVENOUS SOLUTION INTRAVENOUS CONTINUOUS
Status: DISCONTINUED | OUTPATIENT
Start: 2022-01-01 | End: 2022-01-01

## 2022-01-01 RX ORDER — TRAZODONE HYDROCHLORIDE 50 MG/1
50 TABLET ORAL NIGHTLY PRN
Status: DISCONTINUED | OUTPATIENT
Start: 2022-01-01 | End: 2022-01-01 | Stop reason: HOSPADM

## 2022-01-01 RX ORDER — DOXYCYCLINE HYCLATE 100 MG
100 TABLET ORAL EVERY 12 HOURS
Status: DISCONTINUED | OUTPATIENT
Start: 2022-01-01 | End: 2022-01-01 | Stop reason: HOSPADM

## 2022-01-01 RX ORDER — OXYCODONE AND ACETAMINOPHEN 10; 325 MG/1; MG/1
1 TABLET ORAL EVERY 8 HOURS PRN
Status: DISCONTINUED | OUTPATIENT
Start: 2022-01-01 | End: 2022-01-01 | Stop reason: HOSPADM

## 2022-01-01 RX ORDER — OXYCODONE AND ACETAMINOPHEN 5; 325 MG/1; MG/1
1 TABLET ORAL EVERY 4 HOURS PRN
Qty: 10 TABLET | Refills: 0 | Status: SHIPPED | OUTPATIENT
Start: 2022-01-01 | End: 2022-01-01

## 2022-01-01 RX ORDER — DOXYCYCLINE 100 MG/1
100 CAPSULE ORAL EVERY 12 HOURS
Qty: 10 CAPSULE | Refills: 0 | Status: SHIPPED | OUTPATIENT
Start: 2022-01-01 | End: 2022-01-01

## 2022-01-01 RX ORDER — NALOXONE HCL 0.4 MG/ML
0.02 VIAL (ML) INJECTION
Status: DISCONTINUED | OUTPATIENT
Start: 2022-01-01 | End: 2022-01-01 | Stop reason: HOSPADM

## 2022-01-01 RX ORDER — MAG HYDROX/ALUMINUM HYD/SIMETH 200-200-20
30 SUSPENSION, ORAL (FINAL DOSE FORM) ORAL 4 TIMES DAILY PRN
Status: DISCONTINUED | OUTPATIENT
Start: 2022-01-01 | End: 2022-01-01 | Stop reason: HOSPADM

## 2022-01-01 RX ORDER — FUROSEMIDE 20 MG/1
20 TABLET ORAL DAILY
Qty: 90 TABLET | Refills: 3 | Status: SHIPPED | OUTPATIENT
Start: 2022-01-01 | End: 2022-01-01

## 2022-01-01 RX ORDER — EPHEDRINE SULFATE 50 MG/ML
INJECTION, SOLUTION INTRAVENOUS
Status: DISCONTINUED | OUTPATIENT
Start: 2022-01-01 | End: 2022-01-01

## 2022-01-01 RX ORDER — HYDROCODONE BITARTRATE AND ACETAMINOPHEN 7.5; 325 MG/15ML; MG/15ML
15 SOLUTION ORAL EVERY 8 HOURS PRN
Qty: 473 ML | Refills: 0 | Status: SHIPPED | OUTPATIENT
Start: 2022-01-01 | End: 2022-01-01 | Stop reason: HOSPADM

## 2022-01-01 RX ORDER — SODIUM,POTASSIUM PHOSPHATES 280-250MG
2 POWDER IN PACKET (EA) ORAL
Status: COMPLETED | OUTPATIENT
Start: 2022-01-01 | End: 2022-01-01

## 2022-01-01 RX ORDER — DEXAMETHASONE 4 MG/1
TABLET ORAL
Qty: 24 TABLET | Refills: 1 | Status: ON HOLD | OUTPATIENT
Start: 2022-01-01 | End: 2022-01-01 | Stop reason: HOSPADM

## 2022-01-01 RX ORDER — ONDANSETRON 2 MG/ML
8 INJECTION INTRAMUSCULAR; INTRAVENOUS
Status: DISCONTINUED | OUTPATIENT
Start: 2022-01-01 | End: 2022-01-01

## 2022-01-01 RX ORDER — AMIODARONE HYDROCHLORIDE 200 MG/1
200 TABLET ORAL DAILY
Status: DISCONTINUED | OUTPATIENT
Start: 2022-01-01 | End: 2022-01-01 | Stop reason: HOSPADM

## 2022-01-01 RX ORDER — GABAPENTIN 300 MG/1
300 CAPSULE ORAL 3 TIMES DAILY
Status: DISCONTINUED | OUTPATIENT
Start: 2022-01-01 | End: 2022-01-01 | Stop reason: HOSPADM

## 2022-01-01 RX ORDER — ONDANSETRON 2 MG/ML
8 INJECTION INTRAMUSCULAR; INTRAVENOUS EVERY 8 HOURS
Status: DISCONTINUED | OUTPATIENT
Start: 2022-01-01 | End: 2022-01-01

## 2022-01-01 RX ORDER — CEFPODOXIME PROXETIL 100 MG/1
400 TABLET, FILM COATED ORAL EVERY 12 HOURS
Qty: 48 TABLET | Refills: 0 | Status: SHIPPED | OUTPATIENT
Start: 2022-01-01 | End: 2022-01-01

## 2022-01-01 RX ORDER — DOXYCYCLINE HYCLATE 100 MG
100 TABLET ORAL EVERY 12 HOURS
Qty: 14 TABLET | Refills: 0 | Status: SHIPPED | OUTPATIENT
Start: 2022-01-01 | End: 2022-01-01 | Stop reason: SDUPTHER

## 2022-01-01 RX ORDER — IPRATROPIUM BROMIDE AND ALBUTEROL SULFATE 2.5; .5 MG/3ML; MG/3ML
3 SOLUTION RESPIRATORY (INHALATION) EVERY 4 HOURS PRN
Status: DISCONTINUED | OUTPATIENT
Start: 2022-01-01 | End: 2022-01-01 | Stop reason: HOSPADM

## 2022-01-01 RX ORDER — ACYCLOVIR 200 MG/1
200 CAPSULE ORAL 2 TIMES DAILY
Qty: 60 CAPSULE | Refills: 11 | Status: CANCELLED | OUTPATIENT
Start: 2022-01-01 | End: 2023-08-30

## 2022-01-01 RX ORDER — SODIUM CHLORIDE, SODIUM LACTATE, POTASSIUM CHLORIDE, CALCIUM CHLORIDE 600; 310; 30; 20 MG/100ML; MG/100ML; MG/100ML; MG/100ML
INJECTION, SOLUTION INTRAVENOUS CONTINUOUS
Status: DISCONTINUED | OUTPATIENT
Start: 2022-01-01 | End: 2022-01-01

## 2022-01-01 RX ORDER — CEFDINIR 300 MG/1
300 CAPSULE ORAL 2 TIMES DAILY
Qty: 28 CAPSULE | Refills: 0 | Status: SHIPPED | OUTPATIENT
Start: 2022-01-01 | End: 2022-01-01

## 2022-01-01 RX ORDER — DEXMEDETOMIDINE HYDROCHLORIDE 100 UG/ML
INJECTION, SOLUTION INTRAVENOUS
Status: DISCONTINUED | OUTPATIENT
Start: 2022-01-01 | End: 2022-01-01

## 2022-01-01 RX ORDER — FENTANYL CITRATE 50 UG/ML
100 INJECTION, SOLUTION INTRAMUSCULAR; INTRAVENOUS ONCE
Status: CANCELLED | OUTPATIENT
Start: 2022-01-01 | End: 2022-01-01

## 2022-01-01 RX ORDER — HYDROCODONE BITARTRATE AND ACETAMINOPHEN 500; 5 MG/1; MG/1
TABLET ORAL
Status: DISCONTINUED | OUTPATIENT
Start: 2022-01-01 | End: 2022-01-01

## 2022-01-01 RX ORDER — TAMSULOSIN HYDROCHLORIDE 0.4 MG/1
0.4 CAPSULE ORAL DAILY
Status: DISCONTINUED | OUTPATIENT
Start: 2022-01-01 | End: 2022-01-01 | Stop reason: HOSPADM

## 2022-01-01 RX ORDER — IPRATROPIUM BROMIDE AND ALBUTEROL SULFATE 2.5; .5 MG/3ML; MG/3ML
3 SOLUTION RESPIRATORY (INHALATION) EVERY 6 HOURS
Status: DISCONTINUED | OUTPATIENT
Start: 2022-01-01 | End: 2022-01-01 | Stop reason: HOSPADM

## 2022-01-01 RX ORDER — PANTOPRAZOLE SODIUM 40 MG/1
40 TABLET, DELAYED RELEASE ORAL DAILY
Status: DISCONTINUED | OUTPATIENT
Start: 2022-01-01 | End: 2022-01-01 | Stop reason: HOSPADM

## 2022-01-01 RX ORDER — ACETAMINOPHEN 325 MG/1
650 TABLET ORAL EVERY 8 HOURS PRN
Status: DISCONTINUED | OUTPATIENT
Start: 2022-01-01 | End: 2022-01-01 | Stop reason: HOSPADM

## 2022-01-01 RX ORDER — FLUTICASONE PROPIONATE 50 MCG
1 SPRAY, SUSPENSION (ML) NASAL DAILY
Status: DISCONTINUED | OUTPATIENT
Start: 2022-01-01 | End: 2022-01-01 | Stop reason: HOSPADM

## 2022-01-01 RX ORDER — OXYCODONE AND ACETAMINOPHEN 10; 325 MG/1; MG/1
1 TABLET ORAL EVERY 6 HOURS PRN
Status: DISCONTINUED | OUTPATIENT
Start: 2022-01-01 | End: 2022-01-01

## 2022-01-01 RX ORDER — MUPIROCIN 20 MG/G
OINTMENT TOPICAL 2 TIMES DAILY
Qty: 1 EACH | Refills: 0 | Status: SHIPPED | OUTPATIENT
Start: 2022-01-01 | End: 2022-01-01

## 2022-01-01 RX ORDER — FENTANYL CITRATE 50 UG/ML
INJECTION, SOLUTION INTRAMUSCULAR; INTRAVENOUS
Status: DISCONTINUED | OUTPATIENT
Start: 2022-01-01 | End: 2022-01-01

## 2022-01-01 RX ORDER — CLINDAMYCIN HYDROCHLORIDE 300 MG/1
300 CAPSULE ORAL 2 TIMES DAILY
Qty: 10 CAPSULE | Refills: 0 | Status: SHIPPED | OUTPATIENT
Start: 2022-01-01 | End: 2022-01-01 | Stop reason: HOSPADM

## 2022-01-01 RX ORDER — VASOPRESSIN 20 [USP'U]/ML
INJECTION, SOLUTION INTRAMUSCULAR; SUBCUTANEOUS
Status: DISCONTINUED | OUTPATIENT
Start: 2022-01-01 | End: 2022-01-01

## 2022-01-01 RX ORDER — ACETAMINOPHEN 325 MG/1
650 TABLET ORAL EVERY 6 HOURS PRN
Status: DISCONTINUED | OUTPATIENT
Start: 2022-01-01 | End: 2022-01-01 | Stop reason: HOSPADM

## 2022-01-01 RX ORDER — POLYETHYLENE GLYCOL 3350 17 G/17G
17 POWDER, FOR SOLUTION ORAL DAILY
Status: DISCONTINUED | OUTPATIENT
Start: 2022-01-01 | End: 2022-01-01 | Stop reason: HOSPADM

## 2022-01-01 RX ORDER — SODIUM CHLORIDE 0.9 % (FLUSH) 0.9 %
10 SYRINGE (ML) INJECTION EVERY 12 HOURS PRN
Status: DISCONTINUED | OUTPATIENT
Start: 2022-01-01 | End: 2022-01-01 | Stop reason: HOSPADM

## 2022-01-01 RX ORDER — ONDANSETRON 2 MG/ML
INJECTION INTRAMUSCULAR; INTRAVENOUS
Status: DISCONTINUED | OUTPATIENT
Start: 2022-01-01 | End: 2022-01-01

## 2022-01-01 RX ORDER — ACETAMINOPHEN 10 MG/ML
1000 INJECTION, SOLUTION INTRAVENOUS ONCE
Status: COMPLETED | OUTPATIENT
Start: 2022-01-01 | End: 2022-01-01

## 2022-01-01 RX ORDER — ONDANSETRON 8 MG/1
8 TABLET, ORALLY DISINTEGRATING ORAL EVERY 8 HOURS PRN
Status: DISCONTINUED | OUTPATIENT
Start: 2022-01-01 | End: 2022-01-01 | Stop reason: HOSPADM

## 2022-01-01 RX ORDER — SUCRALFATE 1 G/10ML
1 SUSPENSION ORAL
Start: 2022-01-01

## 2022-01-01 RX ORDER — VANCOMYCIN HCL IN 5 % DEXTROSE 1G/250ML
1000 PLASTIC BAG, INJECTION (ML) INTRAVENOUS
Status: COMPLETED | OUTPATIENT
Start: 2022-01-01 | End: 2022-01-01

## 2022-01-01 RX ORDER — SUCRALFATE 1 G/10ML
SUSPENSION ORAL
Status: ON HOLD | COMMUNITY
Start: 2022-01-01 | End: 2022-01-01 | Stop reason: SDUPTHER

## 2022-01-01 RX ORDER — FUROSEMIDE 10 MG/ML
20 INJECTION INTRAMUSCULAR; INTRAVENOUS ONCE
Status: COMPLETED | OUTPATIENT
Start: 2022-01-01 | End: 2022-01-01

## 2022-01-01 RX ORDER — ENOXAPARIN SODIUM 150 MG/ML
120 INJECTION SUBCUTANEOUS EVERY 12 HOURS
Qty: 48 ML | Refills: 0 | Status: SHIPPED | OUTPATIENT
Start: 2022-01-01 | End: 2022-01-01 | Stop reason: HOSPADM

## 2022-01-01 RX ORDER — TAMSULOSIN HYDROCHLORIDE 0.4 MG/1
1 CAPSULE ORAL DAILY
Status: DISCONTINUED | OUTPATIENT
Start: 2022-01-01 | End: 2022-01-01 | Stop reason: HOSPADM

## 2022-01-01 RX ORDER — GABAPENTIN 100 MG/1
100 CAPSULE ORAL 2 TIMES DAILY
Status: DISCONTINUED | OUTPATIENT
Start: 2022-01-01 | End: 2022-01-01 | Stop reason: HOSPADM

## 2022-01-01 RX ORDER — VANCOMYCIN HCL IN 5 % DEXTROSE 1G/250ML
1000 PLASTIC BAG, INJECTION (ML) INTRAVENOUS
Status: DISCONTINUED | OUTPATIENT
Start: 2022-01-01 | End: 2022-01-01

## 2022-01-01 RX ORDER — GLUCAGON 1 MG
1 KIT INJECTION
Status: DISCONTINUED | OUTPATIENT
Start: 2022-01-01 | End: 2022-01-01 | Stop reason: HOSPADM

## 2022-01-01 RX ORDER — ONDANSETRON 2 MG/ML
4 INJECTION INTRAMUSCULAR; INTRAVENOUS EVERY 8 HOURS PRN
Status: DISCONTINUED | OUTPATIENT
Start: 2022-01-01 | End: 2022-01-01

## 2022-01-01 RX ORDER — IBUPROFEN 200 MG
24 TABLET ORAL
Status: DISCONTINUED | OUTPATIENT
Start: 2022-01-01 | End: 2022-01-01 | Stop reason: HOSPADM

## 2022-01-01 RX ORDER — LEVALBUTEROL INHALATION SOLUTION 0.63 MG/3ML
0.63 SOLUTION RESPIRATORY (INHALATION) EVERY 8 HOURS PRN
Status: DISCONTINUED | OUTPATIENT
Start: 2022-01-01 | End: 2022-01-01 | Stop reason: HOSPADM

## 2022-01-01 RX ORDER — SODIUM CHLORIDE 0.9 % (FLUSH) 0.9 %
10 SYRINGE (ML) INJECTION
Status: DISCONTINUED | OUTPATIENT
Start: 2022-01-01 | End: 2022-01-01

## 2022-01-01 RX ORDER — MORPHINE SULFATE 4 MG/ML
2 INJECTION, SOLUTION INTRAMUSCULAR; INTRAVENOUS EVERY 6 HOURS PRN
Status: DISCONTINUED | OUTPATIENT
Start: 2022-01-01 | End: 2022-01-01

## 2022-01-01 RX ORDER — VENLAFAXINE 37.5 MG/1
75 TABLET ORAL 2 TIMES DAILY
Status: DISCONTINUED | OUTPATIENT
Start: 2022-01-01 | End: 2022-01-01 | Stop reason: HOSPADM

## 2022-01-01 RX ORDER — ENOXAPARIN SODIUM 150 MG/ML
120 INJECTION SUBCUTANEOUS
Status: DISCONTINUED | OUTPATIENT
Start: 2022-01-01 | End: 2022-01-01

## 2022-01-01 RX ORDER — GABAPENTIN 100 MG/1
200 CAPSULE ORAL DAILY
Status: DISCONTINUED | OUTPATIENT
Start: 2022-01-01 | End: 2022-01-01 | Stop reason: HOSPADM

## 2022-01-01 RX ORDER — FENTANYL CITRATE 50 UG/ML
25 INJECTION, SOLUTION INTRAMUSCULAR; INTRAVENOUS EVERY 5 MIN PRN
Status: DISCONTINUED | OUTPATIENT
Start: 2022-01-01 | End: 2022-01-01 | Stop reason: HOSPADM

## 2022-01-01 RX ORDER — ALBUTEROL SULFATE 90 UG/1
2 AEROSOL, METERED RESPIRATORY (INHALATION) EVERY 6 HOURS PRN
Qty: 6.7 G | Refills: 0 | Status: SHIPPED | OUTPATIENT
Start: 2022-01-01 | End: 2022-01-01

## 2022-01-01 RX ORDER — ATORVASTATIN CALCIUM 40 MG/1
40 TABLET, FILM COATED ORAL DAILY
Qty: 90 TABLET | Refills: 1 | Status: ON HOLD | OUTPATIENT
Start: 2022-01-01 | End: 2022-01-01 | Stop reason: HOSPADM

## 2022-01-01 RX ORDER — MORPHINE SULFATE 4 MG/ML
4 INJECTION, SOLUTION INTRAMUSCULAR; INTRAVENOUS EVERY 4 HOURS PRN
Status: DISCONTINUED | OUTPATIENT
Start: 2022-01-01 | End: 2022-01-01

## 2022-01-01 RX ORDER — ACETAMINOPHEN 325 MG/1
650 TABLET ORAL EVERY 4 HOURS PRN
Status: DISCONTINUED | OUTPATIENT
Start: 2022-01-01 | End: 2022-01-01 | Stop reason: HOSPADM

## 2022-01-01 RX ORDER — CEPHALEXIN 500 MG/1
500 CAPSULE ORAL EVERY 6 HOURS
Qty: 36 CAPSULE | Refills: 0 | Status: CANCELLED | OUTPATIENT
Start: 2022-01-01 | End: 2022-01-01

## 2022-01-01 RX ORDER — HYDROCODONE BITARTRATE AND ACETAMINOPHEN 500; 5 MG/1; MG/1
TABLET ORAL
Status: DISCONTINUED | OUTPATIENT
Start: 2022-01-01 | End: 2022-01-01 | Stop reason: HOSPADM

## 2022-01-01 RX ORDER — PROPOFOL 10 MG/ML
VIAL (ML) INTRAVENOUS
Status: DISCONTINUED | OUTPATIENT
Start: 2022-01-01 | End: 2022-01-01

## 2022-01-01 RX ORDER — MUPIROCIN 20 MG/G
OINTMENT TOPICAL 2 TIMES DAILY
Status: DISCONTINUED | OUTPATIENT
Start: 2022-01-01 | End: 2022-01-01 | Stop reason: HOSPADM

## 2022-01-01 RX ORDER — ATORVASTATIN CALCIUM 40 MG/1
40 TABLET, FILM COATED ORAL NIGHTLY
Status: CANCELLED
Start: 2022-01-01

## 2022-01-01 RX ORDER — LIDOCAINE HYDROCHLORIDE 10 MG/ML
1 INJECTION, SOLUTION EPIDURAL; INFILTRATION; INTRACAUDAL; PERINEURAL ONCE
Status: CANCELLED | OUTPATIENT
Start: 2022-01-01 | End: 2022-01-01

## 2022-01-01 RX ORDER — SUCCINYLCHOLINE CHLORIDE 20 MG/ML
INJECTION INTRAMUSCULAR; INTRAVENOUS
Status: DISCONTINUED | OUTPATIENT
Start: 2022-01-01 | End: 2022-01-01

## 2022-01-01 RX ORDER — CHOLECALCIFEROL (VITAMIN D3) 25 MCG
2000 TABLET ORAL DAILY
Status: CANCELLED | COMMUNITY
Start: 2022-01-01

## 2022-01-01 RX ORDER — LIDOCAINE AND PRILOCAINE 25; 25 MG/G; MG/G
CREAM TOPICAL
Qty: 30 G | Refills: 2 | Status: SHIPPED | OUTPATIENT
Start: 2022-01-01

## 2022-01-01 RX ORDER — OLMESARTAN MEDOXOMIL 5 MG/1
20 TABLET ORAL DAILY
Qty: 360 TABLET | Refills: 3 | Status: SHIPPED | OUTPATIENT
Start: 2022-01-01 | End: 2022-01-01 | Stop reason: SDUPTHER

## 2022-01-01 RX ORDER — ATORVASTATIN CALCIUM 20 MG/1
40 TABLET, FILM COATED ORAL NIGHTLY
Status: DISCONTINUED | OUTPATIENT
Start: 2022-01-01 | End: 2022-01-01 | Stop reason: HOSPADM

## 2022-01-01 RX ORDER — DOXYCYCLINE 100 MG/1
CAPSULE ORAL
Qty: 28 CAPSULE | Refills: 0 | Status: SHIPPED | OUTPATIENT
Start: 2022-01-01 | End: 2022-01-01

## 2022-01-01 RX ORDER — SIMETHICONE 80 MG
1 TABLET,CHEWABLE ORAL 4 TIMES DAILY PRN
Status: DISCONTINUED | OUTPATIENT
Start: 2022-01-01 | End: 2022-01-01 | Stop reason: HOSPADM

## 2022-01-01 RX ORDER — MECLIZINE HCL 12.5 MG 12.5 MG/1
12.5 TABLET ORAL 3 TIMES DAILY PRN
Qty: 30 TABLET | Refills: 0 | Status: SHIPPED | OUTPATIENT
Start: 2022-01-01 | End: 2022-01-01

## 2022-01-01 RX ORDER — GABAPENTIN 100 MG/1
200 CAPSULE ORAL DAILY
Qty: 30 CAPSULE | Refills: 0 | Status: CANCELLED | OUTPATIENT
Start: 2022-01-01

## 2022-01-01 RX ORDER — ONDANSETRON 2 MG/ML
4 INJECTION INTRAMUSCULAR; INTRAVENOUS EVERY 12 HOURS PRN
Status: DISCONTINUED | OUTPATIENT
Start: 2022-01-01 | End: 2022-01-01 | Stop reason: HOSPADM

## 2022-01-01 RX ORDER — IPRATROPIUM BROMIDE AND ALBUTEROL SULFATE 2.5; .5 MG/3ML; MG/3ML
3 SOLUTION RESPIRATORY (INHALATION) EVERY 4 HOURS PRN
Qty: 75 ML | Refills: 0 | Status: ON HOLD | OUTPATIENT
Start: 2022-01-01 | End: 2022-01-01 | Stop reason: SDUPTHER

## 2022-01-01 RX ORDER — TALC
6 POWDER (GRAM) TOPICAL NIGHTLY PRN
Status: DISCONTINUED | OUTPATIENT
Start: 2022-01-01 | End: 2022-01-01

## 2022-01-01 RX ORDER — HYDROCODONE BITARTRATE AND ACETAMINOPHEN 5; 325 MG/1; MG/1
1 TABLET ORAL EVERY 6 HOURS PRN
Qty: 20 TABLET | Refills: 0 | Status: SHIPPED | OUTPATIENT
Start: 2022-01-01 | End: 2022-01-01

## 2022-01-01 RX ORDER — LOSARTAN POTASSIUM 25 MG/1
25 TABLET ORAL DAILY
Status: DISCONTINUED | OUTPATIENT
Start: 2022-01-01 | End: 2022-01-01

## 2022-01-01 RX ORDER — KETAMINE HYDROCHLORIDE 100 MG/ML
INJECTION, SOLUTION INTRAMUSCULAR; INTRAVENOUS
Status: DISCONTINUED | OUTPATIENT
Start: 2022-01-01 | End: 2022-01-01

## 2022-01-01 RX ORDER — POTASSIUM CHLORIDE 20 MEQ/1
40 TABLET, EXTENDED RELEASE ORAL ONCE
Status: DISCONTINUED | OUTPATIENT
Start: 2022-01-01 | End: 2022-01-01

## 2022-01-01 RX ORDER — DOXYCYCLINE HYCLATE 100 MG
100 TABLET ORAL EVERY 12 HOURS
Status: DISCONTINUED | OUTPATIENT
Start: 2022-01-01 | End: 2022-01-01

## 2022-01-01 RX ORDER — LACTULOSE 10 G/15ML
10 SOLUTION ORAL 3 TIMES DAILY
Status: DISCONTINUED | OUTPATIENT
Start: 2022-01-01 | End: 2022-01-01

## 2022-01-01 RX ORDER — MUPIROCIN 20 MG/G
OINTMENT TOPICAL
Qty: 30 G | Refills: 1 | Status: ON HOLD | OUTPATIENT
Start: 2022-01-01 | End: 2022-01-01 | Stop reason: HOSPADM

## 2022-01-01 RX ORDER — PHENYLEPHRINE HYDROCHLORIDE 10 MG/ML
INJECTION INTRAVENOUS
Status: DISCONTINUED | OUTPATIENT
Start: 2022-01-01 | End: 2022-01-01

## 2022-01-01 RX ORDER — HYDROCODONE BITARTRATE AND ACETAMINOPHEN 7.5; 325 MG/1; MG/1
1 TABLET ORAL EVERY 6 HOURS PRN
Qty: 60 TABLET | Refills: 0 | Status: SHIPPED | OUTPATIENT
Start: 2022-01-01

## 2022-01-01 RX ORDER — MIDAZOLAM HYDROCHLORIDE 1 MG/ML
2 INJECTION INTRAMUSCULAR; INTRAVENOUS ONCE
Status: CANCELLED | OUTPATIENT
Start: 2022-01-01 | End: 2022-01-01

## 2022-01-01 RX ORDER — ENOXAPARIN SODIUM 150 MG/ML
120 INJECTION SUBCUTANEOUS
Status: DISCONTINUED | OUTPATIENT
Start: 2022-01-01 | End: 2022-01-01 | Stop reason: HOSPADM

## 2022-01-01 RX ORDER — OXYCODONE AND ACETAMINOPHEN 5; 325 MG/1; MG/1
2 TABLET ORAL EVERY 6 HOURS PRN
Qty: 25 TABLET | Refills: 0 | Status: SHIPPED | OUTPATIENT
Start: 2022-01-01 | End: 2022-01-01 | Stop reason: SINTOL

## 2022-01-01 RX ORDER — METOPROLOL SUCCINATE 25 MG/1
25 TABLET, EXTENDED RELEASE ORAL DAILY
Status: DISCONTINUED | OUTPATIENT
Start: 2022-01-01 | End: 2022-01-01

## 2022-01-01 RX ORDER — POLYETHYLENE GLYCOL 3350 17 G/17G
17 POWDER, FOR SOLUTION ORAL 2 TIMES DAILY
Status: DISCONTINUED | OUTPATIENT
Start: 2022-01-01 | End: 2022-01-01 | Stop reason: HOSPADM

## 2022-01-01 RX ORDER — TALC
9 POWDER (GRAM) TOPICAL NIGHTLY
Status: DISCONTINUED | OUTPATIENT
Start: 2022-01-01 | End: 2022-01-01 | Stop reason: HOSPADM

## 2022-01-01 RX ORDER — FUROSEMIDE 10 MG/ML
40 INJECTION INTRAMUSCULAR; INTRAVENOUS DAILY
Status: DISCONTINUED | OUTPATIENT
Start: 2022-01-01 | End: 2022-01-01

## 2022-01-01 RX ORDER — TAMSULOSIN HYDROCHLORIDE 0.4 MG/1
1 CAPSULE ORAL NIGHTLY
Status: DISCONTINUED | OUTPATIENT
Start: 2022-01-01 | End: 2022-01-01 | Stop reason: HOSPADM

## 2022-01-01 RX ORDER — IPRATROPIUM BROMIDE AND ALBUTEROL SULFATE 2.5; .5 MG/3ML; MG/3ML
3 SOLUTION RESPIRATORY (INHALATION) EVERY 4 HOURS
Status: DISCONTINUED | OUTPATIENT
Start: 2022-01-01 | End: 2022-01-01 | Stop reason: HOSPADM

## 2022-01-01 RX ORDER — LIDOCAINE HYDROCHLORIDE 20 MG/ML
INJECTION INTRAVENOUS
Status: DISCONTINUED | OUTPATIENT
Start: 2022-01-01 | End: 2022-01-01

## 2022-01-01 RX ORDER — CEPHALEXIN 500 MG/1
500 CAPSULE ORAL EVERY 6 HOURS
Qty: 28 CAPSULE | Refills: 0 | Status: SHIPPED | OUTPATIENT
Start: 2022-01-01 | End: 2022-01-01 | Stop reason: HOSPADM

## 2022-01-01 RX ORDER — IPRATROPIUM BROMIDE AND ALBUTEROL SULFATE 2.5; .5 MG/3ML; MG/3ML
3 SOLUTION RESPIRATORY (INHALATION) EVERY 6 HOURS PRN
Qty: 75 ML | Refills: 0 | Status: SHIPPED | OUTPATIENT
Start: 2022-01-01 | End: 2022-01-01

## 2022-01-01 RX ORDER — SODIUM CHLORIDE 9 MG/ML
INJECTION, SOLUTION INTRAVENOUS CONTINUOUS
Status: DISCONTINUED | OUTPATIENT
Start: 2022-01-01 | End: 2022-01-01 | Stop reason: HOSPADM

## 2022-01-01 RX ORDER — CLINDAMYCIN HYDROCHLORIDE 150 MG/1
300 CAPSULE ORAL EVERY 6 HOURS
Status: DISCONTINUED | OUTPATIENT
Start: 2022-01-01 | End: 2022-01-01

## 2022-01-01 RX ORDER — PROMETHAZINE HYDROCHLORIDE 25 MG/1
25 TABLET ORAL EVERY 6 HOURS PRN
Status: DISCONTINUED | OUTPATIENT
Start: 2022-01-01 | End: 2022-01-01 | Stop reason: HOSPADM

## 2022-01-01 RX ORDER — OXYBUTYNIN CHLORIDE 5 MG/1
5 TABLET, EXTENDED RELEASE ORAL DAILY
Qty: 30 TABLET | Refills: 11 | Status: ON HOLD | OUTPATIENT
Start: 2022-01-01 | End: 2022-01-01 | Stop reason: SDUPTHER

## 2022-01-01 RX ORDER — VENLAFAXINE 75 MG/1
75 TABLET ORAL NIGHTLY
Start: 2022-01-01

## 2022-01-01 RX ORDER — SODIUM CHLORIDE 9 MG/ML
INJECTION, SOLUTION INTRAVENOUS CONTINUOUS
Status: CANCELLED | OUTPATIENT
Start: 2022-01-01

## 2022-01-01 RX ORDER — PANTOPRAZOLE SODIUM 40 MG/1
40 TABLET, DELAYED RELEASE ORAL DAILY
Qty: 90 TABLET | Refills: 3 | Status: SHIPPED | OUTPATIENT
Start: 2022-01-01 | End: 2023-06-23

## 2022-01-01 RX ORDER — IBUPROFEN 400 MG/1
400 TABLET ORAL ONCE
Status: COMPLETED | OUTPATIENT
Start: 2022-01-01 | End: 2022-01-01

## 2022-01-01 RX ORDER — MORPHINE SULFATE 4 MG/ML
2 INJECTION, SOLUTION INTRAMUSCULAR; INTRAVENOUS EVERY 4 HOURS PRN
Status: DISCONTINUED | OUTPATIENT
Start: 2022-01-01 | End: 2022-01-01

## 2022-01-01 RX ORDER — OXYBUTYNIN CHLORIDE 5 MG/1
5 TABLET, EXTENDED RELEASE ORAL NIGHTLY
Status: DISCONTINUED | OUTPATIENT
Start: 2022-01-01 | End: 2022-01-01 | Stop reason: HOSPADM

## 2022-01-01 RX ORDER — ONDANSETRON 2 MG/ML
4 INJECTION INTRAMUSCULAR; INTRAVENOUS
Status: COMPLETED | OUTPATIENT
Start: 2022-01-01 | End: 2022-01-01

## 2022-01-01 RX ORDER — SODIUM CHLORIDE 9 MG/ML
INJECTION, SOLUTION INTRAVENOUS CONTINUOUS
Status: DISCONTINUED | OUTPATIENT
Start: 2022-01-01 | End: 2022-01-01

## 2022-01-01 RX ORDER — ONDANSETRON 2 MG/ML
4 INJECTION INTRAMUSCULAR; INTRAVENOUS DAILY PRN
Status: DISCONTINUED | OUTPATIENT
Start: 2022-01-01 | End: 2022-01-01 | Stop reason: HOSPADM

## 2022-01-01 RX ORDER — ACYCLOVIR 200 MG/1
200 CAPSULE ORAL 2 TIMES DAILY
Status: DISCONTINUED | OUTPATIENT
Start: 2022-01-01 | End: 2022-01-01 | Stop reason: HOSPADM

## 2022-01-01 RX ORDER — DOXYCYCLINE HYCLATE 100 MG
100 TABLET ORAL EVERY 12 HOURS
Qty: 12 TABLET | Refills: 0 | Status: SHIPPED | OUTPATIENT
Start: 2022-01-01 | End: 2022-01-01

## 2022-01-01 RX ORDER — CEPHALEXIN 500 MG/1
500 CAPSULE ORAL EVERY 6 HOURS
Status: DISCONTINUED | OUTPATIENT
Start: 2022-01-01 | End: 2022-01-01

## 2022-01-01 RX ORDER — DOXYCYCLINE HYCLATE 100 MG
100 TABLET ORAL EVERY 12 HOURS
Qty: 18 TABLET | Refills: 0 | Status: CANCELLED | OUTPATIENT
Start: 2022-01-01 | End: 2022-01-01

## 2022-01-01 RX ORDER — MAGNESIUM SULFATE HEPTAHYDRATE 40 MG/ML
2 INJECTION, SOLUTION INTRAVENOUS ONCE
Status: COMPLETED | OUTPATIENT
Start: 2022-01-01 | End: 2022-01-01

## 2022-01-01 RX ORDER — ONDANSETRON 2 MG/ML
8 INJECTION INTRAMUSCULAR; INTRAVENOUS EVERY 8 HOURS PRN
Status: DISCONTINUED | OUTPATIENT
Start: 2022-01-01 | End: 2022-01-01 | Stop reason: HOSPADM

## 2022-01-01 RX ORDER — HYDROCODONE BITARTRATE AND ACETAMINOPHEN 7.5; 325 MG/15ML; MG/15ML
15 SOLUTION ORAL EVERY 8 HOURS PRN
Status: DISCONTINUED | OUTPATIENT
Start: 2022-01-01 | End: 2022-01-01 | Stop reason: HOSPADM

## 2022-01-01 RX ORDER — ACYCLOVIR 200 MG/1
800 CAPSULE ORAL 2 TIMES DAILY
Status: DISCONTINUED | OUTPATIENT
Start: 2022-01-01 | End: 2022-01-01 | Stop reason: HOSPADM

## 2022-01-01 RX ORDER — OXYBUTYNIN CHLORIDE 5 MG/1
5 TABLET, EXTENDED RELEASE ORAL NIGHTLY
Qty: 30 TABLET | Refills: 11
Start: 2022-01-01 | End: 2023-08-17

## 2022-01-01 RX ORDER — ACYCLOVIR 800 MG/1
200 TABLET ORAL 2 TIMES DAILY
COMMUNITY

## 2022-01-01 RX ORDER — OLMESARTAN MEDOXOMIL 5 MG/1
5 TABLET ORAL DAILY
Qty: 90 TABLET | Refills: 3 | Status: SHIPPED | OUTPATIENT
Start: 2022-01-01 | End: 2022-01-01

## 2022-01-01 RX ORDER — CLINDAMYCIN PHOSPHATE 900 MG/50ML
900 INJECTION, SOLUTION INTRAVENOUS
Status: COMPLETED | OUTPATIENT
Start: 2022-01-01 | End: 2022-01-01

## 2022-01-01 RX ORDER — ONDANSETRON 2 MG/ML
8 INJECTION INTRAMUSCULAR; INTRAVENOUS
Status: DISCONTINUED | OUTPATIENT
Start: 2022-01-01 | End: 2022-01-01 | Stop reason: HOSPADM

## 2022-01-01 RX ORDER — ACETAMINOPHEN 325 MG/1
650 TABLET ORAL EVERY 4 HOURS PRN
Status: DISCONTINUED | OUTPATIENT
Start: 2022-01-01 | End: 2022-01-01

## 2022-01-01 RX ORDER — DILTIAZEM HYDROCHLORIDE 5 MG/ML
25 INJECTION INTRAVENOUS EVERY 5 MIN PRN
Status: DISCONTINUED | OUTPATIENT
Start: 2022-01-01 | End: 2022-01-01 | Stop reason: HOSPADM

## 2022-01-01 RX ORDER — METOPROLOL SUCCINATE 25 MG/1
25 TABLET, EXTENDED RELEASE ORAL
Status: COMPLETED | OUTPATIENT
Start: 2022-01-01 | End: 2022-01-01

## 2022-01-01 RX ORDER — OXYCODONE AND ACETAMINOPHEN 10; 325 MG/1; MG/1
1 TABLET ORAL EVERY 6 HOURS PRN
Qty: 120 TABLET | Refills: 0 | Status: ON HOLD | OUTPATIENT
Start: 2022-01-01 | End: 2022-01-01 | Stop reason: HOSPADM

## 2022-01-01 RX ORDER — AMIODARONE HYDROCHLORIDE 200 MG/1
200 TABLET ORAL 2 TIMES DAILY
Status: DISCONTINUED | OUTPATIENT
Start: 2022-01-01 | End: 2022-01-01 | Stop reason: HOSPADM

## 2022-01-01 RX ORDER — IPRATROPIUM BROMIDE AND ALBUTEROL SULFATE 2.5; .5 MG/3ML; MG/3ML
3 SOLUTION RESPIRATORY (INHALATION) EVERY 6 HOURS PRN
Status: DISCONTINUED | OUTPATIENT
Start: 2022-01-01 | End: 2022-01-01

## 2022-01-01 RX ORDER — TRAZODONE HYDROCHLORIDE 50 MG/1
50 TABLET ORAL NIGHTLY PRN
Qty: 30 TABLET | Refills: 2 | Status: ON HOLD | OUTPATIENT
Start: 2022-01-01 | End: 2022-01-01 | Stop reason: HOSPADM

## 2022-01-01 RX ADMIN — LOSARTAN POTASSIUM 12.5 MG: 25 TABLET, FILM COATED ORAL at 08:05

## 2022-01-01 RX ADMIN — Medication 9 MG: at 07:05

## 2022-01-01 RX ADMIN — VANCOMYCIN HYDROCHLORIDE 750 MG: 750 INJECTION, POWDER, LYOPHILIZED, FOR SOLUTION INTRAVENOUS at 09:08

## 2022-01-01 RX ADMIN — DEXAMETHASONE SODIUM PHOSPHATE 8 MG: 4 INJECTION, SOLUTION INTRAMUSCULAR; INTRAVENOUS at 10:05

## 2022-01-01 RX ADMIN — Medication 125 MG: at 05:08

## 2022-01-01 RX ADMIN — CHOLECALCIFEROL TAB 25 MCG (1000 UNIT) 2000 UNITS: 25 TAB at 09:05

## 2022-01-01 RX ADMIN — GABAPENTIN 100 MG: 100 CAPSULE ORAL at 10:08

## 2022-01-01 RX ADMIN — POLYETHYLENE GLYCOL 3350 17 G: 17 POWDER, FOR SOLUTION ORAL at 09:08

## 2022-01-01 RX ADMIN — IPRATROPIUM BROMIDE AND ALBUTEROL SULFATE 3 ML: 2.5; .5 SOLUTION RESPIRATORY (INHALATION) at 07:08

## 2022-01-01 RX ADMIN — GABAPENTIN 100 MG: 100 CAPSULE ORAL at 08:08

## 2022-01-01 RX ADMIN — MUPIROCIN: 20 OINTMENT TOPICAL at 09:05

## 2022-01-01 RX ADMIN — PHENYLEPHRINE HYDROCHLORIDE 100 MCG: 10 INJECTION INTRAVENOUS at 01:05

## 2022-01-01 RX ADMIN — VENLAFAXINE 150 MG: 37.5 TABLET ORAL at 09:05

## 2022-01-01 RX ADMIN — APIXABAN 5 MG: 5 TABLET, FILM COATED ORAL at 12:08

## 2022-01-01 RX ADMIN — APIXABAN 5 MG: 5 TABLET, FILM COATED ORAL at 08:08

## 2022-01-01 RX ADMIN — Medication 10 ML: at 04:06

## 2022-01-01 RX ADMIN — VANCOMYCIN HYDROCHLORIDE 750 MG: 750 INJECTION, POWDER, LYOPHILIZED, FOR SOLUTION INTRAVENOUS at 01:08

## 2022-01-01 RX ADMIN — SENNOSIDES AND DOCUSATE SODIUM 1 TABLET: 50; 8.6 TABLET ORAL at 12:08

## 2022-01-01 RX ADMIN — FUROSEMIDE 40 MG: 40 TABLET ORAL at 09:08

## 2022-01-01 RX ADMIN — HEPARIN 500 UNITS: 100 SYRINGE at 02:07

## 2022-01-01 RX ADMIN — APIXABAN 5 MG: 5 TABLET, FILM COATED ORAL at 09:05

## 2022-01-01 RX ADMIN — IPRATROPIUM BROMIDE AND ALBUTEROL SULFATE 3 ML: 2.5; .5 SOLUTION RESPIRATORY (INHALATION) at 08:08

## 2022-01-01 RX ADMIN — GABAPENTIN 200 MG: 100 CAPSULE ORAL at 09:08

## 2022-01-01 RX ADMIN — METOPROLOL SUCCINATE 50 MG: 50 TABLET, EXTENDED RELEASE ORAL at 09:05

## 2022-01-01 RX ADMIN — ROCURONIUM BROMIDE 20 MG: 10 INJECTION, SOLUTION INTRAVENOUS at 10:05

## 2022-01-01 RX ADMIN — ACYCLOVIR 200 MG: 200 CAPSULE ORAL at 08:08

## 2022-01-01 RX ADMIN — OXYBUTYNIN CHLORIDE 5 MG: 5 TABLET, EXTENDED RELEASE ORAL at 03:08

## 2022-01-01 RX ADMIN — ACYCLOVIR 800 MG: 200 CAPSULE ORAL at 09:05

## 2022-01-01 RX ADMIN — ONDANSETRON 4 MG: 2 INJECTION, SOLUTION INTRAMUSCULAR; INTRAVENOUS at 03:05

## 2022-01-01 RX ADMIN — TRAZODONE HYDROCHLORIDE 50 MG: 50 TABLET ORAL at 09:05

## 2022-01-01 RX ADMIN — ONDANSETRON 8 MG: 2 INJECTION INTRAMUSCULAR; INTRAVENOUS at 05:08

## 2022-01-01 RX ADMIN — DOXYCYCLINE HYCLATE 100 MG: 100 TABLET, FILM COATED ORAL at 09:05

## 2022-01-01 RX ADMIN — CLOPIDOGREL 75 MG: 75 TABLET, FILM COATED ORAL at 08:05

## 2022-01-01 RX ADMIN — Medication 10 ML: at 02:07

## 2022-01-01 RX ADMIN — ONDANSETRON 8 MG: 2 INJECTION INTRAMUSCULAR; INTRAVENOUS at 09:09

## 2022-01-01 RX ADMIN — FLUTICASONE PROPIONATE 50 MCG: 50 SPRAY, METERED NASAL at 09:05

## 2022-01-01 RX ADMIN — METOPROLOL SUCCINATE 25 MG: 25 TABLET, EXTENDED RELEASE ORAL at 09:05

## 2022-01-01 RX ADMIN — VENLAFAXINE 150 MG: 37.5 TABLET ORAL at 08:05

## 2022-01-01 RX ADMIN — CARBOPLATIN 230 MG: 10 INJECTION, SOLUTION INTRAVENOUS at 01:07

## 2022-01-01 RX ADMIN — VENLAFAXINE 75 MG: 37.5 TABLET ORAL at 08:05

## 2022-01-01 RX ADMIN — VASOPRESSIN 1 UNITS: 20 INJECTION INTRAVENOUS at 11:05

## 2022-01-01 RX ADMIN — IPRATROPIUM BROMIDE AND ALBUTEROL SULFATE 3 ML: 2.5; .5 SOLUTION RESPIRATORY (INHALATION) at 09:09

## 2022-01-01 RX ADMIN — VENLAFAXINE 75 MG: 75 TABLET ORAL at 03:08

## 2022-01-01 RX ADMIN — Medication 9 MG: at 09:08

## 2022-01-01 RX ADMIN — POLYETHYLENE GLYCOL 3350 17 G: 17 POWDER, FOR SOLUTION ORAL at 12:08

## 2022-01-01 RX ADMIN — ACETAMINOPHEN 1000 MG: 10 INJECTION INTRAVENOUS at 04:05

## 2022-01-01 RX ADMIN — METOPROLOL SUCCINATE 25 MG: 25 TABLET, EXTENDED RELEASE ORAL at 08:08

## 2022-01-01 RX ADMIN — TRAZODONE HYDROCHLORIDE 50 MG: 50 TABLET ORAL at 10:05

## 2022-01-01 RX ADMIN — AMIODARONE HYDROCHLORIDE 200 MG: 200 TABLET ORAL at 08:08

## 2022-01-01 RX ADMIN — ACETAMINOPHEN 650 MG: 325 TABLET ORAL at 09:05

## 2022-01-01 RX ADMIN — VENLAFAXINE 75 MG: 75 TABLET ORAL at 09:08

## 2022-01-01 RX ADMIN — CEFTRIAXONE 1 G: 1 INJECTION, SOLUTION INTRAVENOUS at 12:08

## 2022-01-01 RX ADMIN — AMIODARONE HYDROCHLORIDE 200 MG: 200 TABLET ORAL at 09:05

## 2022-01-01 RX ADMIN — TAMSULOSIN HYDROCHLORIDE 0.4 MG: 0.4 CAPSULE ORAL at 11:05

## 2022-01-01 RX ADMIN — LEVALBUTEROL HYDROCHLORIDE 0.63 MG: 0.63 SOLUTION RESPIRATORY (INHALATION) at 11:08

## 2022-01-01 RX ADMIN — ATORVASTATIN CALCIUM 40 MG: 20 TABLET, FILM COATED ORAL at 08:08

## 2022-01-01 RX ADMIN — DEXAMETHASONE SODIUM PHOSPHATE 0.25 MG: 10 INJECTION, SOLUTION INTRAMUSCULAR; INTRAVENOUS at 12:07

## 2022-01-01 RX ADMIN — LEVALBUTEROL HYDROCHLORIDE 0.63 MG: 0.63 SOLUTION RESPIRATORY (INHALATION) at 04:08

## 2022-01-01 RX ADMIN — METOPROLOL SUCCINATE 25 MG: 25 TABLET, EXTENDED RELEASE ORAL at 09:08

## 2022-01-01 RX ADMIN — PHENYLEPHRINE HYDROCHLORIDE 200 MCG: 10 INJECTION INTRAVENOUS at 01:05

## 2022-01-01 RX ADMIN — LOSARTAN POTASSIUM 50 MG: 25 TABLET, FILM COATED ORAL at 02:05

## 2022-01-01 RX ADMIN — FUROSEMIDE 40 MG: 40 TABLET ORAL at 08:08

## 2022-01-01 RX ADMIN — LIDOCAINE HYDROCHLORIDE 15 ML: 20 SOLUTION ORAL; TOPICAL at 08:08

## 2022-01-01 RX ADMIN — ATORVASTATIN CALCIUM 40 MG: 40 TABLET, FILM COATED ORAL at 09:05

## 2022-01-01 RX ADMIN — ENOXAPARIN SODIUM 120 MG: 150 INJECTION SUBCUTANEOUS at 09:09

## 2022-01-01 RX ADMIN — VENLAFAXINE 75 MG: 75 TABLET ORAL at 08:08

## 2022-01-01 RX ADMIN — VANCOMYCIN HYDROCHLORIDE 2000 MG: 500 INJECTION, POWDER, LYOPHILIZED, FOR SOLUTION INTRAVENOUS at 02:08

## 2022-01-01 RX ADMIN — PROPOFOL 50 MG: 10 INJECTION, EMULSION INTRAVENOUS at 10:05

## 2022-01-01 RX ADMIN — ACYCLOVIR 200 MG: 200 CAPSULE ORAL at 08:09

## 2022-01-01 RX ADMIN — VENLAFAXINE 75 MG: 37.5 TABLET ORAL at 09:05

## 2022-01-01 RX ADMIN — ACYCLOVIR 200 MG: 200 CAPSULE ORAL at 09:09

## 2022-01-01 RX ADMIN — LOSARTAN POTASSIUM 12.5 MG: 25 TABLET, FILM COATED ORAL at 09:05

## 2022-01-01 RX ADMIN — IPRATROPIUM BROMIDE AND ALBUTEROL SULFATE 3 ML: 2.5; .5 SOLUTION RESPIRATORY (INHALATION) at 01:05

## 2022-01-01 RX ADMIN — VANCOMYCIN HYDROCHLORIDE 125 MG: KIT at 09:08

## 2022-01-01 RX ADMIN — SENNOSIDES AND DOCUSATE SODIUM 2 TABLET: 50; 8.6 TABLET ORAL at 12:05

## 2022-01-01 RX ADMIN — CARBOPLATIN 210 MG: 10 INJECTION INTRAVENOUS at 03:06

## 2022-01-01 RX ADMIN — VANCOMYCIN HYDROCHLORIDE 125 MG: KIT at 11:08

## 2022-01-01 RX ADMIN — VANCOMYCIN HYDROCHLORIDE 125 MG: KIT at 08:08

## 2022-01-01 RX ADMIN — METOPROLOL SUCCINATE 25 MG: 25 TABLET, EXTENDED RELEASE ORAL at 03:05

## 2022-01-01 RX ADMIN — IPRATROPIUM BROMIDE AND ALBUTEROL SULFATE 3 ML: 2.5; .5 SOLUTION RESPIRATORY (INHALATION) at 10:05

## 2022-01-01 RX ADMIN — ONDANSETRON 8 MG: 2 INJECTION INTRAMUSCULAR; INTRAVENOUS at 02:08

## 2022-01-01 RX ADMIN — TAMSULOSIN HYDROCHLORIDE 0.4 MG: 0.4 CAPSULE ORAL at 08:05

## 2022-01-01 RX ADMIN — FUROSEMIDE 40 MG: 10 INJECTION, SOLUTION INTRAMUSCULAR; INTRAVENOUS at 07:09

## 2022-01-01 RX ADMIN — ONDANSETRON 8 MG: 2 INJECTION INTRAMUSCULAR; INTRAVENOUS at 12:08

## 2022-01-01 RX ADMIN — PHENYLEPHRINE HYDROCHLORIDE 100 MCG: 10 INJECTION INTRAVENOUS at 03:05

## 2022-01-01 RX ADMIN — FUROSEMIDE 20 MG: 20 TABLET ORAL at 09:08

## 2022-01-01 RX ADMIN — GABAPENTIN 300 MG: 300 CAPSULE ORAL at 09:05

## 2022-01-01 RX ADMIN — SODIUM CHLORIDE 500 ML: 0.9 INJECTION, SOLUTION INTRAVENOUS at 02:07

## 2022-01-01 RX ADMIN — ENOXAPARIN SODIUM 150 MG: 150 INJECTION SUBCUTANEOUS at 11:08

## 2022-01-01 RX ADMIN — GABAPENTIN 300 MG: 300 CAPSULE ORAL at 04:05

## 2022-01-01 RX ADMIN — GABAPENTIN 300 MG: 300 CAPSULE ORAL at 08:05

## 2022-01-01 RX ADMIN — OXYBUTYNIN CHLORIDE 5 MG: 5 TABLET, EXTENDED RELEASE ORAL at 08:08

## 2022-01-01 RX ADMIN — LOSARTAN POTASSIUM 50 MG: 25 TABLET, FILM COATED ORAL at 09:05

## 2022-01-01 RX ADMIN — PANTOPRAZOLE SODIUM 40 MG: 40 TABLET, DELAYED RELEASE ORAL at 08:09

## 2022-01-01 RX ADMIN — APIXABAN 5 MG: 5 TABLET, FILM COATED ORAL at 08:05

## 2022-01-01 RX ADMIN — VANCOMYCIN HYDROCHLORIDE 1000 MG: 1 INJECTION, POWDER, LYOPHILIZED, FOR SOLUTION INTRAVENOUS at 11:06

## 2022-01-01 RX ADMIN — CLOPIDOGREL 75 MG: 75 TABLET, FILM COATED ORAL at 09:05

## 2022-01-01 RX ADMIN — GABAPENTIN 100 MG: 100 CAPSULE ORAL at 09:08

## 2022-01-01 RX ADMIN — GUAIFENESIN AND DEXTROMETHORPHAN HYDROBROMIDE 1 TABLET: 600; 30 TABLET, EXTENDED RELEASE ORAL at 08:05

## 2022-01-01 RX ADMIN — METOPROLOL SUCCINATE 25 MG: 25 TABLET, EXTENDED RELEASE ORAL at 11:05

## 2022-01-01 RX ADMIN — APIXABAN 5 MG: 5 TABLET, FILM COATED ORAL at 09:08

## 2022-01-01 RX ADMIN — IPRATROPIUM BROMIDE AND ALBUTEROL SULFATE 3 ML: 2.5; .5 SOLUTION RESPIRATORY (INHALATION) at 11:05

## 2022-01-01 RX ADMIN — MIDAZOLAM HYDROCHLORIDE 1 MG: 1 INJECTION, SOLUTION INTRAMUSCULAR; INTRAVENOUS at 10:05

## 2022-01-01 RX ADMIN — Medication 9 MG: at 08:05

## 2022-01-01 RX ADMIN — AMIODARONE HYDROCHLORIDE 200 MG: 200 TABLET ORAL at 09:08

## 2022-01-01 RX ADMIN — OXYCODONE AND ACETAMINOPHEN 1 TABLET: 10; 325 TABLET ORAL at 09:05

## 2022-01-01 RX ADMIN — IOHEXOL 85 ML: 350 INJECTION, SOLUTION INTRAVENOUS at 02:04

## 2022-01-01 RX ADMIN — FUROSEMIDE 40 MG: 40 TABLET ORAL at 09:09

## 2022-01-01 RX ADMIN — SODIUM CHLORIDE 225 MG: 9 INJECTION, SOLUTION INTRAVENOUS at 01:07

## 2022-01-01 RX ADMIN — IPRATROPIUM BROMIDE AND ALBUTEROL SULFATE 3 ML: 2.5; .5 SOLUTION RESPIRATORY (INHALATION) at 03:09

## 2022-01-01 RX ADMIN — GABAPENTIN 200 MG: 100 CAPSULE ORAL at 08:08

## 2022-01-01 RX ADMIN — IPRATROPIUM BROMIDE AND ALBUTEROL SULFATE 3 ML: 2.5; .5 SOLUTION RESPIRATORY (INHALATION) at 12:05

## 2022-01-01 RX ADMIN — OXYBUTYNIN CHLORIDE 5 MG: 5 TABLET, EXTENDED RELEASE ORAL at 09:08

## 2022-01-01 RX ADMIN — TRAZODONE HYDROCHLORIDE 50 MG: 50 TABLET ORAL at 08:05

## 2022-01-01 RX ADMIN — Medication 9 MG: at 08:08

## 2022-01-01 RX ADMIN — TRAZODONE HYDROCHLORIDE 50 MG: 50 TABLET ORAL at 12:05

## 2022-01-01 RX ADMIN — Medication 10 ML: at 04:08

## 2022-01-01 RX ADMIN — AMIODARONE HYDROCHLORIDE 1 MG/MIN: 1.8 INJECTION, SOLUTION INTRAVENOUS at 12:05

## 2022-01-01 RX ADMIN — FLUTICASONE PROPIONATE 50 MCG: 50 SPRAY, METERED NASAL at 04:05

## 2022-01-01 RX ADMIN — IPRATROPIUM BROMIDE AND ALBUTEROL SULFATE 3 ML: 2.5; .5 SOLUTION RESPIRATORY (INHALATION) at 02:09

## 2022-01-01 RX ADMIN — TAMSULOSIN HYDROCHLORIDE 0.4 MG: 0.4 CAPSULE ORAL at 03:08

## 2022-01-01 RX ADMIN — Medication 9 MG: at 09:05

## 2022-01-01 RX ADMIN — POLYETHYLENE GLYCOL 3350 17 G: 17 POWDER, FOR SOLUTION ORAL at 12:05

## 2022-01-01 RX ADMIN — FUROSEMIDE 40 MG: 10 INJECTION, SOLUTION INTRAMUSCULAR; INTRAVENOUS at 01:05

## 2022-01-01 RX ADMIN — OXYBUTYNIN CHLORIDE 5 MG: 5 TABLET, EXTENDED RELEASE ORAL at 09:05

## 2022-01-01 RX ADMIN — HEPARIN 500 UNITS: 100 SYRINGE at 01:07

## 2022-01-01 RX ADMIN — OXYBUTYNIN CHLORIDE 5 MG: 5 TABLET, EXTENDED RELEASE ORAL at 08:05

## 2022-01-01 RX ADMIN — CEPHALEXIN 500 MG: 500 CAPSULE ORAL at 06:08

## 2022-01-01 RX ADMIN — METOPROLOL SUCCINATE 25 MG: 25 TABLET, EXTENDED RELEASE ORAL at 08:05

## 2022-01-01 RX ADMIN — CEPHALEXIN 500 MG: 500 CAPSULE ORAL at 03:08

## 2022-01-01 RX ADMIN — KETAMINE HYDROCHLORIDE 10 MG: 100 INJECTION, SOLUTION, CONCENTRATE INTRAMUSCULAR; INTRAVENOUS at 01:05

## 2022-01-01 RX ADMIN — ATORVASTATIN CALCIUM 40 MG: 40 TABLET, FILM COATED ORAL at 08:05

## 2022-01-01 RX ADMIN — FUROSEMIDE 20 MG: 20 TABLET ORAL at 08:05

## 2022-01-01 RX ADMIN — ACYCLOVIR 800 MG: 200 CAPSULE ORAL at 08:05

## 2022-01-01 RX ADMIN — TAMSULOSIN HYDROCHLORIDE 0.4 MG: 0.4 CAPSULE ORAL at 08:08

## 2022-01-01 RX ADMIN — IPRATROPIUM BROMIDE AND ALBUTEROL SULFATE 3 ML: 2.5; .5 SOLUTION RESPIRATORY (INHALATION) at 12:08

## 2022-01-01 RX ADMIN — HEPARIN SODIUM 18 UNITS/KG/HR: 5000 INJECTION INTRAVENOUS; SUBCUTANEOUS at 04:08

## 2022-01-01 RX ADMIN — CEPHALEXIN 500 MG: 500 CAPSULE ORAL at 05:08

## 2022-01-01 RX ADMIN — CEPHALEXIN 500 MG: 500 CAPSULE ORAL at 11:08

## 2022-01-01 RX ADMIN — PHENYLEPHRINE HYDROCHLORIDE 100 MCG: 10 INJECTION INTRAVENOUS at 10:05

## 2022-01-01 RX ADMIN — GUAIFENESIN AND DEXTROMETHORPHAN HYDROBROMIDE 1 TABLET: 600; 30 TABLET, EXTENDED RELEASE ORAL at 09:05

## 2022-01-01 RX ADMIN — ATORVASTATIN CALCIUM 40 MG: 40 TABLET, FILM COATED ORAL at 08:08

## 2022-01-01 RX ADMIN — SUCCINYLCHOLINE CHLORIDE 140 MG: 20 INJECTION, SOLUTION INTRAMUSCULAR; INTRAVENOUS at 10:05

## 2022-01-01 RX ADMIN — CLOPIDOGREL 75 MG: 75 TABLET, FILM COATED ORAL at 03:05

## 2022-01-01 RX ADMIN — FUROSEMIDE 20 MG: 20 TABLET ORAL at 09:05

## 2022-01-01 RX ADMIN — GABAPENTIN 300 MG: 300 CAPSULE ORAL at 05:05

## 2022-01-01 RX ADMIN — LIDOCAINE HYDROCHLORIDE 15 ML: 20 SOLUTION ORAL; TOPICAL at 09:08

## 2022-01-01 RX ADMIN — SODIUM CHLORIDE: 0.9 INJECTION, SOLUTION INTRAVENOUS at 10:06

## 2022-01-01 RX ADMIN — ONDANSETRON 8 MG: 2 INJECTION INTRAMUSCULAR; INTRAVENOUS at 04:08

## 2022-01-01 RX ADMIN — PHENYLEPHRINE HYDROCHLORIDE 200 MCG: 10 INJECTION INTRAVENOUS at 11:05

## 2022-01-01 RX ADMIN — ONDANSETRON 4 MG: 2 INJECTION INTRAMUSCULAR; INTRAVENOUS at 07:08

## 2022-01-01 RX ADMIN — AMIODARONE HYDROCHLORIDE 200 MG: 200 TABLET ORAL at 09:09

## 2022-01-01 RX ADMIN — LIDOCAINE HYDROCHLORIDE 100 MG: 20 INJECTION, SOLUTION INTRAVENOUS at 10:05

## 2022-01-01 RX ADMIN — SENNOSIDES AND DOCUSATE SODIUM 2 TABLET: 50; 8.6 TABLET ORAL at 09:05

## 2022-01-01 RX ADMIN — ATORVASTATIN CALCIUM 40 MG: 20 TABLET, FILM COATED ORAL at 02:08

## 2022-01-01 RX ADMIN — SODIUM CHLORIDE 1000 ML: 0.9 INJECTION, SOLUTION INTRAVENOUS at 02:08

## 2022-01-01 RX ADMIN — KETAMINE HYDROCHLORIDE 30 MG: 100 INJECTION, SOLUTION, CONCENTRATE INTRAMUSCULAR; INTRAVENOUS at 11:05

## 2022-01-01 RX ADMIN — EPHEDRINE SULFATE 10 MG: 50 INJECTION INTRAVENOUS at 11:05

## 2022-01-01 RX ADMIN — POLYETHYLENE GLYCOL 3350 17 G: 17 POWDER, FOR SOLUTION ORAL at 09:09

## 2022-01-01 RX ADMIN — IPRATROPIUM BROMIDE AND ALBUTEROL SULFATE 3 ML: 2.5; .5 SOLUTION RESPIRATORY (INHALATION) at 07:05

## 2022-01-01 RX ADMIN — VENLAFAXINE 75 MG: 75 TABLET ORAL at 09:09

## 2022-01-01 RX ADMIN — FUROSEMIDE 20 MG: 20 TABLET ORAL at 03:05

## 2022-01-01 RX ADMIN — IPRATROPIUM BROMIDE AND ALBUTEROL SULFATE 3 ML: 2.5; .5 SOLUTION RESPIRATORY (INHALATION) at 08:05

## 2022-01-01 RX ADMIN — CLOPIDOGREL 75 MG: 75 TABLET, FILM COATED ORAL at 08:08

## 2022-01-01 RX ADMIN — SODIUM CHLORIDE 500 ML: 0.9 INJECTION, SOLUTION INTRAVENOUS at 11:07

## 2022-01-01 RX ADMIN — IPRATROPIUM BROMIDE AND ALBUTEROL SULFATE 3 ML: 2.5; .5 SOLUTION RESPIRATORY (INHALATION) at 09:05

## 2022-01-01 RX ADMIN — DEXMEDETOMIDINE HYDROCHLORIDE 12 MCG: 100 INJECTION, SOLUTION, CONCENTRATE INTRAVENOUS at 11:06

## 2022-01-01 RX ADMIN — ATORVASTATIN CALCIUM 40 MG: 40 TABLET, FILM COATED ORAL at 10:08

## 2022-01-01 RX ADMIN — SODIUM CHLORIDE, SODIUM LACTATE, POTASSIUM CHLORIDE, AND CALCIUM CHLORIDE 1000 ML: .6; .31; .03; .02 INJECTION, SOLUTION INTRAVENOUS at 11:08

## 2022-01-01 RX ADMIN — CLOPIDOGREL 75 MG: 75 TABLET, FILM COATED ORAL at 09:08

## 2022-01-01 RX ADMIN — GABAPENTIN 200 MG: 100 CAPSULE ORAL at 09:09

## 2022-01-01 RX ADMIN — AMIODARONE HYDROCHLORIDE 200 MG: 200 TABLET ORAL at 08:05

## 2022-01-01 RX ADMIN — IPRATROPIUM BROMIDE AND ALBUTEROL SULFATE 3 ML: 2.5; .5 SOLUTION RESPIRATORY (INHALATION) at 04:05

## 2022-01-01 RX ADMIN — IPRATROPIUM BROMIDE AND ALBUTEROL SULFATE 3 ML: 2.5; .5 SOLUTION RESPIRATORY (INHALATION) at 02:08

## 2022-01-01 RX ADMIN — Medication 125 MG: at 12:08

## 2022-01-01 RX ADMIN — OXYCODONE AND ACETAMINOPHEN 1 TABLET: 10; 325 TABLET ORAL at 11:05

## 2022-01-01 RX ADMIN — PROPOFOL 150 MG: 10 INJECTION, EMULSION INTRAVENOUS at 10:05

## 2022-01-01 RX ADMIN — IPRATROPIUM BROMIDE AND ALBUTEROL SULFATE 3 ML: 2.5; .5 SOLUTION RESPIRATORY (INHALATION) at 11:08

## 2022-01-01 RX ADMIN — TAMSULOSIN HYDROCHLORIDE 0.4 MG: 0.4 CAPSULE ORAL at 09:08

## 2022-01-01 RX ADMIN — HEPARIN 500 UNITS: 100 SYRINGE at 03:06

## 2022-01-01 RX ADMIN — PANTOPRAZOLE SODIUM 40 MG: 40 TABLET, DELAYED RELEASE ORAL at 08:08

## 2022-01-01 RX ADMIN — DEXAMETHASONE SODIUM PHOSPHATE 0.25 MG: 10 INJECTION, SOLUTION INTRAMUSCULAR; INTRAVENOUS at 01:07

## 2022-01-01 RX ADMIN — LEVALBUTEROL HYDROCHLORIDE 0.63 MG: 0.63 SOLUTION RESPIRATORY (INHALATION) at 05:08

## 2022-01-01 RX ADMIN — HEPARIN 500 UNITS: 100 SYRINGE at 12:07

## 2022-01-01 RX ADMIN — ATORVASTATIN CALCIUM 40 MG: 20 TABLET, FILM COATED ORAL at 09:08

## 2022-01-01 RX ADMIN — ENOXAPARIN SODIUM 120 MG: 150 INJECTION SUBCUTANEOUS at 10:09

## 2022-01-01 RX ADMIN — Medication 10 ML: at 03:07

## 2022-01-01 RX ADMIN — OXYCODONE AND ACETAMINOPHEN 1 TABLET: 5; 325 TABLET ORAL at 05:05

## 2022-01-01 RX ADMIN — SODIUM CHLORIDE 1000 ML: 0.9 INJECTION, SOLUTION INTRAVENOUS at 07:05

## 2022-01-01 RX ADMIN — Medication 9 MG: at 02:08

## 2022-01-01 RX ADMIN — MECLIZINE HCL 25MG 25 MG: 25 TABLET, CHEWABLE ORAL at 02:05

## 2022-01-01 RX ADMIN — SODIUM CHLORIDE 500 ML: 9 INJECTION, SOLUTION INTRAVENOUS at 08:05

## 2022-01-01 RX ADMIN — PANTOPRAZOLE SODIUM 40 MG: 40 TABLET, DELAYED RELEASE ORAL at 09:08

## 2022-01-01 RX ADMIN — CEFAZOLIN SODIUM 1 G: 1 SOLUTION INTRAVENOUS at 03:08

## 2022-01-01 RX ADMIN — SODIUM CHLORIDE, SODIUM LACTATE, POTASSIUM CHLORIDE, AND CALCIUM CHLORIDE: .6; .31; .03; .02 INJECTION, SOLUTION INTRAVENOUS at 08:05

## 2022-01-01 RX ADMIN — APIXABAN 5 MG: 5 TABLET, FILM COATED ORAL at 10:05

## 2022-01-01 RX ADMIN — HYDROCODONE BITARTRATE AND ACETAMINOPHEN 1 TABLET: 7.5; 325 TABLET ORAL at 02:08

## 2022-01-01 RX ADMIN — POTASSIUM BICARBONATE 25 MEQ: 978 TABLET, EFFERVESCENT ORAL at 02:08

## 2022-01-01 RX ADMIN — FUROSEMIDE 20 MG: 10 INJECTION, SOLUTION INTRAMUSCULAR; INTRAVENOUS at 10:08

## 2022-01-01 RX ADMIN — HEPARIN 500 UNITS: 100 SYRINGE at 03:07

## 2022-01-01 RX ADMIN — ATORVASTATIN CALCIUM 40 MG: 40 TABLET, FILM COATED ORAL at 09:08

## 2022-01-01 RX ADMIN — PHENYLEPHRINE HYDROCHLORIDE 100 MCG: 10 INJECTION INTRAVENOUS at 02:05

## 2022-01-01 RX ADMIN — CARBOPLATIN 220 MG: 10 INJECTION INTRAVENOUS at 02:06

## 2022-01-01 RX ADMIN — Medication 10 ML: at 12:07

## 2022-01-01 RX ADMIN — Medication 10 ML: at 02:08

## 2022-01-01 RX ADMIN — LIDOCAINE HYDROCHLORIDE 15 ML: 20 SOLUTION ORAL; TOPICAL at 02:08

## 2022-01-01 RX ADMIN — APIXABAN 5 MG: 5 TABLET, FILM COATED ORAL at 10:08

## 2022-01-01 RX ADMIN — OXYBUTYNIN CHLORIDE 5 MG: 5 TABLET, EXTENDED RELEASE ORAL at 09:09

## 2022-01-01 RX ADMIN — Medication 125 MG: at 11:08

## 2022-01-01 RX ADMIN — HEPARIN SODIUM (PORCINE) LOCK FLUSH IV SOLN 100 UNIT/ML 5 ML: 100 SOLUTION at 11:06

## 2022-01-01 RX ADMIN — METOPROLOL SUCCINATE 25 MG: 25 TABLET, EXTENDED RELEASE ORAL at 09:09

## 2022-01-01 RX ADMIN — DOXYCYCLINE HYCLATE 100 MG: 100 TABLET, FILM COATED ORAL at 08:05

## 2022-01-01 RX ADMIN — DEXAMETHASONE SODIUM PHOSPHATE 0.25 MG: 10 INJECTION, SOLUTION INTRAMUSCULAR; INTRAVENOUS at 02:06

## 2022-01-01 RX ADMIN — IPRATROPIUM BROMIDE AND ALBUTEROL SULFATE 3 ML: 2.5; .5 SOLUTION RESPIRATORY (INHALATION) at 02:05

## 2022-01-01 RX ADMIN — AMIODARONE HYDROCHLORIDE 0.5 MG/MIN: 1.8 INJECTION, SOLUTION INTRAVENOUS at 05:05

## 2022-01-01 RX ADMIN — POLYETHYLENE GLYCOL 3350 17 G: 17 POWDER, FOR SOLUTION ORAL at 08:08

## 2022-01-01 RX ADMIN — IBUPROFEN 400 MG: 400 TABLET ORAL at 02:05

## 2022-01-01 RX ADMIN — AMIODARONE HYDROCHLORIDE 200 MG: 200 TABLET ORAL at 08:09

## 2022-01-01 RX ADMIN — DOXYCYCLINE HYCLATE 100 MG: 100 TABLET, COATED ORAL at 08:08

## 2022-01-01 RX ADMIN — Medication 2 PACKET: at 11:05

## 2022-01-01 RX ADMIN — ENOXAPARIN SODIUM 120 MG: 150 INJECTION SUBCUTANEOUS at 10:08

## 2022-01-01 RX ADMIN — METOPROLOL TARTRATE 2.5 MG: 5 INJECTION, SOLUTION INTRAVENOUS at 08:05

## 2022-01-01 RX ADMIN — POLYETHYLENE GLYCOL 3350 17 G: 17 POWDER, FOR SOLUTION ORAL at 09:05

## 2022-01-01 RX ADMIN — ONDANSETRON 8 MG: 2 INJECTION INTRAMUSCULAR; INTRAVENOUS at 11:08

## 2022-01-01 RX ADMIN — LOSARTAN POTASSIUM 25 MG: 25 TABLET, FILM COATED ORAL at 09:05

## 2022-01-01 RX ADMIN — VANCOMYCIN HYDROCHLORIDE 125 MG: KIT at 10:08

## 2022-01-01 RX ADMIN — VANCOMYCIN HYDROCHLORIDE 125 MG: KIT at 08:09

## 2022-01-01 RX ADMIN — POLYETHYLENE GLYCOL 3350 17 G: 17 POWDER, FOR SOLUTION ORAL at 08:05

## 2022-01-01 RX ADMIN — SODIUM CHLORIDE 500 ML: 9 INJECTION, SOLUTION INTRAVENOUS at 03:06

## 2022-01-01 RX ADMIN — SODIUM CHLORIDE, SODIUM LACTATE, POTASSIUM CHLORIDE, AND CALCIUM CHLORIDE: .6; .31; .03; .02 INJECTION, SOLUTION INTRAVENOUS at 11:05

## 2022-01-01 RX ADMIN — GABAPENTIN 300 MG: 300 CAPSULE ORAL at 03:05

## 2022-01-01 RX ADMIN — LIDOCAINE HYDROCHLORIDE 3 ML: 10 INJECTION, SOLUTION INFILTRATION; PERINEURAL at 03:08

## 2022-01-01 RX ADMIN — SODIUM CHLORIDE, SODIUM LACTATE, POTASSIUM CHLORIDE, AND CALCIUM CHLORIDE: .6; .31; .03; .02 INJECTION, SOLUTION INTRAVENOUS at 02:05

## 2022-01-01 RX ADMIN — FUROSEMIDE 40 MG: 10 INJECTION, SOLUTION INTRAMUSCULAR; INTRAVENOUS at 08:08

## 2022-01-01 RX ADMIN — OXYCODONE AND ACETAMINOPHEN 1 TABLET: 10; 325 TABLET ORAL at 07:05

## 2022-01-01 RX ADMIN — HEPARIN 500 UNITS: 100 SYRINGE at 02:08

## 2022-01-01 RX ADMIN — Medication 400 MG: at 08:05

## 2022-01-01 RX ADMIN — Medication 2 PACKET: at 04:05

## 2022-01-01 RX ADMIN — SODIUM CHLORIDE: 0.9 INJECTION, SOLUTION INTRAVENOUS at 09:08

## 2022-01-01 RX ADMIN — ACYCLOVIR 200 MG: 200 CAPSULE ORAL at 09:08

## 2022-01-01 RX ADMIN — CHOLECALCIFEROL TAB 25 MCG (1000 UNIT) 2000 UNITS: 25 TAB at 03:05

## 2022-01-01 RX ADMIN — POLYETHYLENE GLYCOL 3350 17 G: 17 POWDER, FOR SOLUTION ORAL at 11:08

## 2022-01-01 RX ADMIN — ATORVASTATIN CALCIUM 40 MG: 20 TABLET, FILM COATED ORAL at 10:08

## 2022-01-01 RX ADMIN — SODIUM CHLORIDE: 0.9 INJECTION, SOLUTION INTRAVENOUS at 02:08

## 2022-01-01 RX ADMIN — Medication 9 MG: at 10:08

## 2022-01-01 RX ADMIN — HEPARIN 500 UNITS: 100 SYRINGE at 03:08

## 2022-01-01 RX ADMIN — GABAPENTIN 200 MG: 100 CAPSULE ORAL at 08:09

## 2022-01-01 RX ADMIN — VASOPRESSIN 0.5 UNITS: 20 INJECTION INTRAVENOUS at 02:05

## 2022-01-01 RX ADMIN — VANCOMYCIN HYDROCHLORIDE 750 MG: 750 INJECTION, POWDER, LYOPHILIZED, FOR SOLUTION INTRAVENOUS at 09:09

## 2022-01-01 RX ADMIN — DEXAMETHASONE SODIUM PHOSPHATE 0.25 MG: 10 INJECTION, SOLUTION INTRAMUSCULAR; INTRAVENOUS at 01:08

## 2022-01-01 RX ADMIN — VANCOMYCIN HYDROCHLORIDE 2000 MG: 500 INJECTION, POWDER, LYOPHILIZED, FOR SOLUTION INTRAVENOUS at 11:08

## 2022-01-01 RX ADMIN — FUROSEMIDE 40 MG: 40 TABLET ORAL at 08:09

## 2022-01-01 RX ADMIN — VASOPRESSIN 0.5 UNITS: 20 INJECTION INTRAVENOUS at 11:05

## 2022-01-01 RX ADMIN — ENOXAPARIN SODIUM 120 MG: 150 INJECTION SUBCUTANEOUS at 11:08

## 2022-01-01 RX ADMIN — SODIUM CHLORIDE: 0.9 INJECTION, SOLUTION INTRAVENOUS at 01:08

## 2022-01-01 RX ADMIN — VANCOMYCIN HYDROCHLORIDE 1000 MG: 1 INJECTION, POWDER, LYOPHILIZED, FOR SOLUTION INTRAVENOUS at 12:08

## 2022-01-01 RX ADMIN — Medication 10 ML: at 01:07

## 2022-01-01 RX ADMIN — ONDANSETRON 4 MG: 2 INJECTION INTRAMUSCULAR; INTRAVENOUS at 09:08

## 2022-01-01 RX ADMIN — ENOXAPARIN SODIUM 150 MG: 150 INJECTION SUBCUTANEOUS at 01:08

## 2022-01-01 RX ADMIN — CHOLECALCIFEROL TAB 25 MCG (1000 UNIT) 2000 UNITS: 25 TAB at 08:05

## 2022-01-01 RX ADMIN — HEPARIN 500 UNITS: 100 SYRINGE at 04:08

## 2022-01-01 RX ADMIN — VANCOMYCIN HYDROCHLORIDE 125 MG: KIT at 09:09

## 2022-01-01 RX ADMIN — VANCOMYCIN HYDROCHLORIDE 125 MG: KIT at 02:09

## 2022-01-01 RX ADMIN — Medication 10 ML: at 03:06

## 2022-01-01 RX ADMIN — METRONIDAZOLE 500 MG: 500 TABLET ORAL at 06:08

## 2022-01-01 RX ADMIN — DOXYCYCLINE HYCLATE 100 MG: 100 TABLET, FILM COATED ORAL at 03:05

## 2022-01-01 RX ADMIN — CLOPIDOGREL 75 MG: 75 TABLET, FILM COATED ORAL at 10:05

## 2022-01-01 RX ADMIN — TAMSULOSIN HYDROCHLORIDE 0.4 MG: 0.4 CAPSULE ORAL at 10:08

## 2022-01-01 RX ADMIN — TUBERCULIN PURIFIED PROTEIN DERIVATIVE 5 UNITS: 5 INJECTION, SOLUTION INTRADERMAL at 11:08

## 2022-01-01 RX ADMIN — OXYCODONE AND ACETAMINOPHEN 1 TABLET: 10; 325 TABLET ORAL at 08:05

## 2022-01-01 RX ADMIN — DEXAMETHASONE SODIUM PHOSPHATE 0.25 MG: 10 INJECTION, SOLUTION INTRAMUSCULAR; INTRAVENOUS at 01:06

## 2022-01-01 RX ADMIN — Medication 10 ML: at 03:08

## 2022-01-01 RX ADMIN — KETAMINE HYDROCHLORIDE 10 MG: 100 INJECTION, SOLUTION, CONCENTRATE INTRAMUSCULAR; INTRAVENOUS at 02:05

## 2022-01-01 RX ADMIN — GABAPENTIN 300 MG: 300 CAPSULE ORAL at 02:05

## 2022-01-01 RX ADMIN — METOPROLOL SUCCINATE 25 MG: 25 TABLET, EXTENDED RELEASE ORAL at 08:09

## 2022-01-01 RX ADMIN — TAMSULOSIN HYDROCHLORIDE 0.4 MG: 0.4 CAPSULE ORAL at 09:05

## 2022-01-01 RX ADMIN — GABAPENTIN 300 MG: 300 CAPSULE ORAL at 07:05

## 2022-01-01 RX ADMIN — LACTULOSE 10 G: 20 SOLUTION ORAL at 08:08

## 2022-01-01 RX ADMIN — ONDANSETRON 8 MG: 2 INJECTION INTRAMUSCULAR; INTRAVENOUS at 06:08

## 2022-01-01 RX ADMIN — SODIUM CHLORIDE 1000 ML: 0.9 INJECTION, SOLUTION INTRAVENOUS at 01:08

## 2022-01-01 RX ADMIN — MUPIROCIN: 20 OINTMENT TOPICAL at 08:05

## 2022-01-01 RX ADMIN — CEFTRIAXONE 1 G: 1 INJECTION, SOLUTION INTRAVENOUS at 07:09

## 2022-01-01 RX ADMIN — VANCOMYCIN HYDROCHLORIDE 1000 MG: 1 INJECTION, POWDER, LYOPHILIZED, FOR SOLUTION INTRAVENOUS at 08:08

## 2022-01-01 RX ADMIN — CARBOPLATIN 195 MG: 10 INJECTION, SOLUTION INTRAVENOUS at 02:08

## 2022-01-01 RX ADMIN — OXYCODONE AND ACETAMINOPHEN 1 TABLET: 10; 325 TABLET ORAL at 10:05

## 2022-01-01 RX ADMIN — SODIUM CHLORIDE 500 ML: 0.9 INJECTION, SOLUTION INTRAVENOUS at 02:06

## 2022-01-01 RX ADMIN — ONDANSETRON 4 MG: 2 INJECTION INTRAMUSCULAR; INTRAVENOUS at 11:08

## 2022-01-01 RX ADMIN — ACYCLOVIR 200 MG: 200 CAPSULE ORAL at 10:08

## 2022-01-01 RX ADMIN — HEPARIN SODIUM 18 UNITS/KG/HR: 5000 INJECTION INTRAVENOUS; SUBCUTANEOUS at 11:08

## 2022-01-01 RX ADMIN — HEPARIN SODIUM 18 UNITS/KG/HR: 5000 INJECTION INTRAVENOUS; SUBCUTANEOUS at 05:08

## 2022-01-01 RX ADMIN — POTASSIUM BICARBONATE 50 MEQ: 978 TABLET, EFFERVESCENT ORAL at 12:08

## 2022-01-01 RX ADMIN — AMIODARONE HYDROCHLORIDE 0.5 MG/MIN: 1.8 INJECTION, SOLUTION INTRAVENOUS at 06:05

## 2022-01-01 RX ADMIN — PHENYLEPHRINE HYDROCHLORIDE 30 MCG/MIN: 10 INJECTION INTRAVENOUS at 11:05

## 2022-01-01 RX ADMIN — SENNOSIDES AND DOCUSATE SODIUM 1 TABLET: 50; 8.6 TABLET ORAL at 10:08

## 2022-01-01 RX ADMIN — CEFTRIAXONE 1 G: 1 INJECTION, SOLUTION INTRAVENOUS at 11:08

## 2022-01-01 RX ADMIN — Medication 9 MG: at 09:09

## 2022-01-01 RX ADMIN — IPRATROPIUM BROMIDE AND ALBUTEROL SULFATE 3 ML: 2.5; .5 SOLUTION RESPIRATORY (INHALATION) at 03:05

## 2022-01-01 RX ADMIN — LEVALBUTEROL HYDROCHLORIDE 0.63 MG: 0.63 SOLUTION RESPIRATORY (INHALATION) at 02:08

## 2022-01-01 RX ADMIN — PANTOPRAZOLE SODIUM 40 MG: 40 TABLET, DELAYED RELEASE ORAL at 09:09

## 2022-01-01 RX ADMIN — ONDANSETRON 8 MG: 2 INJECTION INTRAMUSCULAR; INTRAVENOUS at 07:08

## 2022-01-01 RX ADMIN — AMIODARONE HYDROCHLORIDE 150 MG: 1.5 INJECTION, SOLUTION INTRAVENOUS at 12:05

## 2022-01-01 RX ADMIN — ACETAMINOPHEN 650 MG: 325 TABLET ORAL at 12:05

## 2022-01-01 RX ADMIN — ROCURONIUM BROMIDE 20 MG: 10 INJECTION, SOLUTION INTRAVENOUS at 12:05

## 2022-01-01 RX ADMIN — FUROSEMIDE 40 MG: 10 INJECTION, SOLUTION INTRAMUSCULAR; INTRAVENOUS at 02:08

## 2022-01-01 RX ADMIN — PHENYLEPHRINE HYDROCHLORIDE 200 MCG: 10 INJECTION INTRAVENOUS at 10:05

## 2022-01-01 RX ADMIN — EPHEDRINE SULFATE 10 MG: 50 INJECTION INTRAVENOUS at 10:05

## 2022-01-01 RX ADMIN — ENOXAPARIN SODIUM 120 MG: 150 INJECTION SUBCUTANEOUS at 09:08

## 2022-01-01 RX ADMIN — LIDOCAINE HYDROCHLORIDE 15 ML: 20 SOLUTION ORAL; TOPICAL at 03:08

## 2022-01-01 RX ADMIN — IPRATROPIUM BROMIDE AND ALBUTEROL SULFATE 3 ML: 2.5; .5 SOLUTION RESPIRATORY (INHALATION) at 01:09

## 2022-01-01 RX ADMIN — KETAMINE HYDROCHLORIDE 10 MG: 100 INJECTION, SOLUTION, CONCENTRATE INTRAMUSCULAR; INTRAVENOUS at 12:05

## 2022-01-01 RX ADMIN — HEPARIN 500 UNITS: 100 SYRINGE at 12:06

## 2022-01-01 RX ADMIN — DEXAMETHASONE SODIUM PHOSPHATE 0.25 MG: 10 INJECTION, SOLUTION INTRAMUSCULAR; INTRAVENOUS at 02:07

## 2022-01-01 RX ADMIN — VENLAFAXINE 75 MG: 37.5 TABLET ORAL at 10:05

## 2022-01-01 RX ADMIN — IPRATROPIUM BROMIDE AND ALBUTEROL SULFATE 3 ML: 2.5; .5 SOLUTION RESPIRATORY (INHALATION) at 08:09

## 2022-01-01 RX ADMIN — IPRATROPIUM BROMIDE AND ALBUTEROL SULFATE 3 ML: 2.5; .5 SOLUTION RESPIRATORY (INHALATION) at 12:09

## 2022-01-01 RX ADMIN — LACTULOSE 10 G: 20 SOLUTION ORAL at 09:09

## 2022-01-01 RX ADMIN — HEPARIN 500 UNITS: 100 SYRINGE at 04:06

## 2022-01-01 RX ADMIN — OXYBUTYNIN CHLORIDE 5 MG: 5 TABLET, EXTENDED RELEASE ORAL at 10:08

## 2022-01-01 RX ADMIN — PANTOPRAZOLE SODIUM 40 MG: 40 TABLET, DELAYED RELEASE ORAL at 08:05

## 2022-01-01 RX ADMIN — VENLAFAXINE 75 MG: 75 TABLET ORAL at 10:08

## 2022-01-01 RX ADMIN — ONDANSETRON 8 MG: 2 INJECTION INTRAMUSCULAR; INTRAVENOUS at 09:08

## 2022-01-01 RX ADMIN — ATORVASTATIN CALCIUM 40 MG: 20 TABLET, FILM COATED ORAL at 09:09

## 2022-01-01 RX ADMIN — SUGAMMADEX 50 MG: 100 INJECTION, SOLUTION INTRAVENOUS at 03:05

## 2022-01-01 RX ADMIN — CEFTRIAXONE 1 G: 1 INJECTION, SOLUTION INTRAVENOUS at 08:08

## 2022-01-01 RX ADMIN — VANCOMYCIN HYDROCHLORIDE 1250 MG: 1.25 INJECTION, POWDER, LYOPHILIZED, FOR SOLUTION INTRAVENOUS at 04:08

## 2022-01-01 RX ADMIN — IPRATROPIUM BROMIDE AND ALBUTEROL SULFATE 3 ML: 2.5; .5 SOLUTION RESPIRATORY (INHALATION) at 07:09

## 2022-01-01 RX ADMIN — TAMSULOSIN HYDROCHLORIDE 0.4 MG: 0.4 CAPSULE ORAL at 09:09

## 2022-01-01 RX ADMIN — SENNOSIDES AND DOCUSATE SODIUM 2 TABLET: 50; 8.6 TABLET ORAL at 08:05

## 2022-01-01 RX ADMIN — LEVALBUTEROL HYDROCHLORIDE 0.63 MG: 0.63 SOLUTION RESPIRATORY (INHALATION) at 08:08

## 2022-01-01 RX ADMIN — METOPROLOL TARTRATE 2.5 MG: 5 INJECTION, SOLUTION INTRAVENOUS at 10:05

## 2022-01-01 RX ADMIN — CARBOPLATIN 225 MG: 10 INJECTION, SOLUTION INTRAVENOUS at 01:07

## 2022-01-01 RX ADMIN — FENTANYL CITRATE 50 MCG: 50 INJECTION, SOLUTION INTRAMUSCULAR; INTRAVENOUS at 10:05

## 2022-01-01 RX ADMIN — Medication 10 ML: at 12:06

## 2022-01-01 RX ADMIN — ENOXAPARIN SODIUM 150 MG: 150 INJECTION SUBCUTANEOUS at 12:08

## 2022-01-01 RX ADMIN — SODIUM CHLORIDE 225 MG: 9 INJECTION, SOLUTION INTRAVENOUS at 02:07

## 2022-01-01 RX ADMIN — ROCURONIUM BROMIDE 20 MG: 10 INJECTION, SOLUTION INTRAVENOUS at 01:05

## 2022-01-01 RX ADMIN — MAGNESIUM SULFATE HEPTAHYDRATE 2 G: 40 INJECTION, SOLUTION INTRAVENOUS at 06:08

## 2022-01-01 RX ADMIN — OXYCODONE AND ACETAMINOPHEN 2 TABLET: 5; 325 TABLET ORAL at 03:05

## 2022-01-01 RX ADMIN — DOXYCYCLINE HYCLATE 100 MG: 100 TABLET, COATED ORAL at 09:08

## 2022-01-01 RX ADMIN — PHENYLEPHRINE HYDROCHLORIDE 100 MCG: 10 INJECTION INTRAVENOUS at 11:05

## 2022-01-01 RX ADMIN — SODIUM CHLORIDE 500 ML: 0.9 INJECTION, SOLUTION INTRAVENOUS at 01:08

## 2022-01-01 RX ADMIN — LEVALBUTEROL HYDROCHLORIDE 0.63 MG: 0.63 SOLUTION RESPIRATORY (INHALATION) at 01:08

## 2022-01-01 RX ADMIN — CLINDAMYCIN PHOSPHATE 900 MG: 18 INJECTION, SOLUTION INTRAVENOUS at 10:05

## 2022-01-01 RX ADMIN — SODIUM CHLORIDE, SODIUM LACTATE, POTASSIUM CHLORIDE, AND CALCIUM CHLORIDE 500 ML: .6; .31; .03; .02 INJECTION, SOLUTION INTRAVENOUS at 10:05

## 2022-01-01 RX ADMIN — ACYCLOVIR 800 MG: 200 CAPSULE ORAL at 07:05

## 2022-01-04 NOTE — TELEPHONE ENCOUNTER
----- Message from Joanie Simons sent at 1/4/2022 10:10 AM CST -----  Type: Patient Call Back    Who called:pt    What is the request in detail:pt requesting psa order to be placed. Call pt    Can the clinic reply by [facility]?    Would the patient rather a call back or a response via [facility]? call    Best call back number:[phone] (home)       Additional Information:

## 2022-01-10 NOTE — PROGRESS NOTES
Subjective:       Patient ID:  Janusz Montgomery Jr. is a 76 y.o. male who presents for   Chief Complaint   Patient presents with    Skin Check     TBSE ; flaky spot on nose      History of Present Illness: The patient presents for follow up of skin check.    The patient was last seen on: 9/20/21 for cryosurgery to actinic keratoses which have resolved.   This is a high risk patient here to check for the development of new lesions.    Other skin complaints: several areas of concern on face etc    Patient with new complaint of lesion(s)  Location: nose  Duration: 1 month  Symptoms: dark and flaky  Relieving factors/Previous treatments: none          Review of Systems   Skin: Positive for activity-related sunscreen use and wears hat. Negative for daily sunscreen use and recent sunburn.   Hematologic/Lymphatic: Bruises/bleeds easily (on eliquis).        Objective:    Physical Exam   Constitutional: He appears well-developed and well-nourished. He is obese.  No distress.   HENT:   Mouth/Throat: Lips normal.    Eyes: Lids are normal.  No conjunctival no injection.   Cardiovascular: There is dependent edema (legs).     Lymphadenopathy:        Head (right side): No submental, no submandibular, no preauricular, no posterior auricular and no occipital adenopathy present.        Head (left side): No submental, no submandibular, no preauricular, no posterior auricular and no occipital adenopathy present.     He has no axillary adenopathy.   Neurological: He is alert and oriented to person, place, and time. He is not disoriented.   Psychiatric: He has a normal mood and affect.   Skin:   Areas Examined (abnormalities noted in diagram):   Head / Face Inspection Performed  Chest / Axilla Inspection Performed  Back Inspection Performed  LLE Inspection Performed                   Diagram Legend     Erythematous scaling macule/papule c/w actinic keratosis       Vascular papule c/w angioma      Pigmented verrucoid papule/plaque c/w  seborrheic keratosis      Yellow umbilicated papule c/w sebaceous hyperplasia      Irregularly shaped tan macule c/w lentigo     1-2 mm smooth white papules consistent with Milia      Movable subcutaneous cyst with punctum c/w epidermal inclusion cyst      Subcutaneous movable cyst c/w pilar cyst      Firm pink to brown papule c/w dermatofibroma      Pedunculated fleshy papule(s) c/w skin tag(s)      Evenly pigmented macule c/w junctional nevus     Mildly variegated pigmented, slightly irregular-bordered macule c/w mildly atypical nevus      Flesh colored to evenly pigmented papule c/w intradermal nevus       Pink pearly papule/plaque c/w basal cell carcinoma      Erythematous hyperkeratotic cursted plaque c/w SCC      Surgical scar with no sign of skin cancer recurrence      Open and closed comedones      Inflammatory papules and pustules      Verrucoid papule consistent consistent with wart     Erythematous eczematous patches and plaques     Dystrophic onycholytic nail with subungual debris c/w onychomycosis     Umbilicated papule    Erythematous-base heme-crusted tan verrucoid plaque consistent with inflamed seborrheic keratosis     Erythematous Silvery Scaling Plaque c/w Psoriasis     See annotation              Assessment / Plan:      Pathology Orders:     Normal Orders This Visit    Specimen to Pathology, Dermatology     Questions:    Procedure Type: Dermatology and skin neoplasms    Number of Specimens: 1    ------------------------: -------------------------    Spec 1 Procedure: Biopsy    Spec 1 Clinical Impression: r/o scc vs other    Spec 1 Source: left lateral ankle    Release to patient: Immediate        Neoplasm of uncertain behavior of skin  -     Specimen to Pathology, Dermatology    Shave biopsy procedure note:    Shave biopsy performed after verbal consent including risk of infection, scar, recurrence, need for additional treatment of site. Area prepped with alcohol, anesthetized with approximately  1.0cc of 1% lidocaine with epinephrine. Lesional tissue shaved with razor blade. Hemostasis achieved with application of aluminum chloride followed by hyfrecation. No complications. Dressing applied. Wound care explained.    Given the location and pt's h/o PVD, discussed risk of non wound healing/ulceration following bx    AK (actinic keratosis)  Cryosurgery Procedure Note    Verbal consent from the patient is obtained including, but not limited to, risk of hypopigmentation/hyperpigmentation, scar, recurrence of lesion. The patient is aware of the precancerous quality and need for treatment of these lesions. Liquid nitrogen cryosurgery is applied to the 5 actinic keratoses, as detailed in the physical exam, to produce a freeze injury. The patient is aware that blisters may form and is instructed on wound care with gentle cleansing and use of vaseline ointment to keep moist until healed. The patient is supplied a handout on cryosurgery and is instructed to call if lesions do not completely resolve.    SK (seborrheic keratosis)  These are benign inherited growths without a malignant potential. Reassurance given to patient. No treatment is necessary.                Follow up in about 6 months (around 7/10/2022).

## 2022-01-10 NOTE — PATIENT INSTRUCTIONS
Shave Biopsy Wound Care    Your doctor has performed a shave biopsy today.  A band aid and vaseline ointment has been placed over the site.  This should remain in place for NO LONGER THAN 48 hours.  It is fine to remove the bandaid after 24 hours, if the area is no longer bleeding. It is recommended that you keep the area dry (do not wet)) for the first 24 hours.  After 24 hours, wash the area with warm soap and water and apply Vaseline jelly.  Many patients prefer to use Neosporin or Bacitracin ointment.  This is acceptable; however, know that you can develop an allergy to this medication even if you have used it safely for years.  It is important to keep the area moist.  Letting it dry out and get air slows healing time, and will worsen the scar.        If you notice increasing redness, tenderness, pain, or yellow drainage at the biopsy site, please notify your doctor.  These are signs of an infection.    If your biopsy site is bleeding, apply firm pressure for 15 minutes straight.  Repeat for another 15 minutes, if it is still bleeding.   If the surgical site continues to bleed, then please contact your doctor.      For [facility] users:   You will receive your biopsy results in [facility] as soon as they are available. Please be assured that your physician/provider will review your results and will then determine what further treatment, evaluation, or planning is required. You should be contacted by your physician's/provider's office within 5 business days of receiving your results; If not, please reach out to directly. This is one more way [facility] is putting you first.     [facility] [place] [address]/ [phone] FAX/ [email or web address]      CRYOSURGERY      Your doctor has used a method called cryosurgery to treat your skin condition. Cryosurgery refers to the use of very cold substances to treat a variety of skin conditions such as warts, pre-skin cancers, molluscum  contagiosum, sun spots, and several benign growths. The substance we use in cryosurgery is liquid nitrogen and is so cold (-195 degrees Celsius) that is burns when administered.     Following treatment in the office, the skin may immediately burn and become red. You may find the area around the lesion is affected as well. It is sometimes necessary to treat not only the lesion, but a small area of the surrounding normal skin to achieve a good response.     A blister, and even a blood filled blister, may form after treatment.   This is a normal response. If the blister is painful, it is acceptable to sterilize a needle and with rubbing alcohol and gently pop the blister. It is important that you gently wash the area with soap and warm water as the blister fluid may contain wart virus if a wart was treated. Do no remove the roof of the blister.     The area treated can take anywhere from 1-3 weeks to heal. Healing time depends on the kind skin lesion treated, the location, and how aggressively the lesion was treated. It is recommended that the areas treated are covered with Vaseline or bacitracin ointment and a band-aid. If a band-aid is not practical, just ointment applied several times per day will do. Keeping these areas moist will speed the healing time.    Treatment with liquid nitrogen can leave a scar. In dark skin, it may be a light or dark scar, in light skin it may be a white or pink scar. These will generally fade with time, but may never go away completely.     If you have any concerns after your treatment, please feel free to call the office.       [facility] [place] [address]/ [phone] FAX/ [email or web address]    SEBORRHEIC KERATOSES        What causes seborrheic keratoses?    Seborrheic keratoses are harmless, common skin growths that first appear during adult life.  As time goes by, more growths appear.  Some persons have a very large number of them.  Seborrheic keratoses  appear on both covered and uncovered parts of the body; they are not caused by sunlight.  The tendency to develop seborrheic keratoses is inherited.    Seborrheic keratoses are harmless and never become malignant.  They begin as slightly raised, light brown spots.  Gradually they thicken and take on a rough wartlike surface.  They slowly darken and may turn black.  These color changes are harmless.  Seborrheic keratoses are superficial and look as if they were stuck on the skin.  Persons who have had several seborrheic keratoses can usually recognize this type of benign growth.  However, if you are concerned or unsure about any growth, consult me.    Treatment    Seborrheic keratoses can easily be removed in the office.  The only reason for removing a seborrheic keratosis is your wish to get rid of it.
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
## 2022-01-12 NOTE — TELEPHONE ENCOUNTER
----- Message from Malini Pagan NP sent at 1/12/2022  1:50 PM CST -----  Pls call pt with the below questions:    I am trying to interpret his bloodwork he just got and need these answers:    What are your blood pressures at home? I only see a couple recently.  Have you had any dizziness or been sick at all recently?    Thanks,  Malini

## 2022-01-13 NOTE — TELEPHONE ENCOUNTER
----- Message from Yuly Fneton MA sent at 1/13/2022  9:04 AM CST -----  Austin Nayak,    Not sure if you saw message from yesterday.    Pt. States no sickness,dizziness.  Pain above the ankle and back of leg.    1/11/22  147/58    1/12/22  110/60
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
## 2022-02-03 NOTE — TELEPHONE ENCOUNTER
I spoke to patient to schedule an appointment with Dr Welch tomorrow for 2:30pm with pft's and 6mwt prior. Patient to come for 1pm for Rapid COVID test prior to pft's. Mr Montgomery stated with any exertion he is sob. He stated he has an inhaler for daily use and one inhaler for rescue. I advise patient with difficulty breathing and sob he should be evaluated by the ED. Patient verbalzied understanding.

## 2022-02-04 PROBLEM — I50.30 (HFPEF) HEART FAILURE WITH PRESERVED EJECTION FRACTION: Status: ACTIVE | Noted: 2022-01-01

## 2022-02-04 PROBLEM — J96.91 RESPIRATORY FAILURE, UNSPECIFIED WITH HYPOXIA: Status: ACTIVE | Noted: 2022-01-01

## 2022-02-04 NOTE — ASSESSMENT & PLAN NOTE
Following with sleep clinic, needs new mask; he will follow up with Sleep  Discussed possibility that weight loss will assist with symptoms, will see Bariatrics again for medical therapies.

## 2022-02-04 NOTE — ASSESSMENT & PLAN NOTE
Pleural plaques, + small L-sided effusion with no hx thoracentesis  -repeat CT Chest ordered; last done over 12 months prior and symptoms worsened

## 2022-02-04 NOTE — ASSESSMENT & PLAN NOTE
Appears slightly overloaded on this visit, recommended patient to discuss with his cardiologist increasing dose of daily lasix. Focused on salt intake, patient and his wife don't cook so very limited in terms of food options, trying to eat salads and sandwiches at home.   Continue home ARB, BB.

## 2022-02-04 NOTE — PROGRESS NOTES
Subjective:       Patient ID: Janusz Montgomery Jr. is a 77 y.o. male.    Chief Complaint: Shortness of Breath    Mr. Montgomery is a 76 yo with PMH of asbestos exposure, HFpEF on lasix, CAD s/p 3 stents, PAF on eliquis and metoprolol, carotid stenosis, CKD, basal cell carcinoma, prostate cancer s/p brachytherapy in 2018, GERD, CHARISMA on CPAP, BCC left eye s/p removal who presents for follow-up visit to this clinic; last seen in 2019. He has undergone cardiac rehab; and partially went through pulmonary rehab, and was also prescribed Breo since then.     He reports on this visit: worsened SOB lately, trouble controlling salt. Thinks he has lost about 20lbs over the past few years. He has not been using his inhalers, because he does not feel a difference in symptoms. Is very adherent to to CPAP and medication use other than inhalers.     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 Still using old one with nasal pillow mask. Seeing Dr. Loredo and SHAKEEL Howard for Sleep medicine.   Symptoms: SOB with any activity including ADLS, no coughing, minimal GERD, no wheezing, no nasal congestion,+ pitting edema  Smoking: never  Medications: Breo, doesn't use it    PFTs today  FEV1/FVC: 72  FEV1: 61  FVC: 62  T  RV: 46  DLCO: 50  Impression: No obstruction, moderate restriction, moderate reduction in DLCO    6MWT today:  Dropped from 98 % to 88% with activity, normal hemodynamic response.     Prior Studies:  Echo: 2020:  · The left ventricle is normal in size with normal systolic function. The estimated ejection fraction is 55%.  · There is mild left ventricular concentric hypertrophy.  · Indeterminate diastolic function.  · Normal right ventricular size with normal right ventricular systolic function.  · Moderate left atrial enlargement.  · Normal central venous pressure (3 mmHg).  · The estimated PA systolic pressure is 9 mmHg.    CT Chest  10/2020:  Impression:  No acute findings.  Mild left lower lobe volume loss with a partially loculated pleural fluid collection, similar to the 07/23/2019 exam.  Scattered calcified pleural plaques, suggesting asbestosis related pleural disease, stable    Review of Systems   Constitutional: Positive for weight loss. Negative for fever, fatigue and night sweats.   HENT: Negative.  Negative for rhinorrhea, sinus pressure and congestion.    Eyes: Negative.    Respiratory: Positive for shortness of breath and dyspnea on extertion. Negative for cough, hemoptysis, sputum production, wheezing, orthopnea and somnolence.    Cardiovascular: Positive for leg swelling.   Genitourinary: Negative.    Endocrine: endocrine negative   Musculoskeletal: Negative.    Skin: Negative.    Gastrointestinal: Negative.    Neurological: Negative.    Psychiatric/Behavioral: Negative.        Objective:      Physical Exam   Constitutional: He is oriented to person, place, and time. He appears well-developed and well-nourished. He is obese.   HENT:   Right Ear: External ear normal.   Left Ear: External ear normal.   Nose: Nose normal.   Mouth/Throat: Oropharynx is clear and moist.   Cardiovascular: Normal rate, regular rhythm and normal heart sounds. Exam reveals no gallop and no friction rub.   No murmur heard.  Pulmonary/Chest: Normal expansion, symmetric chest wall expansion and effort normal. He has no wheezes. He has no rhonchi.   Scattered soft crackles throughout, no wheezing, no coughing   Abdominal: Soft. Bowel sounds are normal. He exhibits no distension. There is no abdominal tenderness.   Musculoskeletal:         General: Edema present. No deformity.      Cervical back: Normal range of motion and neck supple.   Lymphadenopathy:     He has no cervical adenopathy.   Neurological: He is alert and oriented to person, place, and time. Gait normal.   Skin: Skin is warm and dry. No cyanosis. Nails show no clubbing.   Psychiatric: He has a  "normal mood and affect. His behavior is normal.     Personal Diagnostic Review  Pulmonary function tests: reviewed, see above      Assessment:       1. CHARISMA on CPAP    2. Morbid obesity with body mass index of 45.0-49.9 in adult    3. Asbestosis    4. Interstitial pulmonary disease, unspecified    5. Heart failure with preserved ejection fraction, unspecified HF chronicity    6. Chronic respiratory failure with hypoxia    7. Small airways disease          Orders Placed This Encounter   Procedures    OXYGEN FOR HOME USE     Order Specific Question:   Liter Flow     Answer:   2     Order Specific Question:   Duration     Answer:   With activity     Comments:   and with sleep     Order Specific Question:   Qualifying Test Performed at:     Answer:   Activity     Order Specific Question:   Oxygen saturation at rest     Answer:   98     Order Specific Question:   Oxygen saturation with activity     Answer:   88     Order Specific Question:   Oxygen saturation with activity on oxygen     Answer:   95     Order Specific Question:   Portable mode:     Answer:   continuous     Order Specific Question:   Route     Answer:   nasal cannula     Order Specific Question:   Device:     Answer:   home concentrator with portable concentrator     Order Specific Question:   Length of need (in months):     Answer:   99 mos     Order Specific Question:   Patient condition with qualifying saturation     Answer:   CHF     Order Specific Question:   Height:     Answer:   6' 1" (1.854 m)     Order Specific Question:   Weight:     Answer:   158.6 kg (349 lb 10.4 oz)     Order Specific Question:   Alternative treatment measures have been tried or considered and deemed clinically ineffective.     Answer:   Yes     Order Specific Question:   Vendor:     Answer:   Other (use comments)     Order Specific Question:   Expected Date of Delivery:     Answer:   2/4/2022    CT Chest Without Contrast     Standing Status:   Future     Standing Expiration " Date:   2/4/2023     Order Specific Question:   May the Radiologist modify the order per protocol to meet the clinical needs of the patient?     Answer:   Yes    Ambulatory referral/consult to Bariatric Medicine     Standing Status:   Future     Standing Expiration Date:   3/4/2023     Referral Priority:   Routine     Referral Type:   Consultation     Referral Reason:   Specialty Services Required     Requested Specialty:   Bariatrics     Number of Visits Requested:   1    Ambulatory referral/consult to Pulmonary Rehab     Standing Status:   Future     Standing Expiration Date:   3/4/2023     Referral Priority:   Routine     Referral Type:   Consultation     Referral Reason:   Specialty Services Required     Requested Specialty:   Pulmonary Disease     Number of Visits Requested:   1       Plan:       Problem List Items Addressed This Visit        Pulmonary    Small airways disease    Current Assessment & Plan     No indication for inhalers currently, does not have any improvement in symptoms and no obstruction on PFTs. Denies cough or wheezing.          Asbestosis    Current Assessment & Plan     Pleural plaques, + small L-sided effusion with no hx thoracentesis  -repeat CT Chest ordered; last done over 12 months prior and symptoms worsened         Respiratory failure, unspecified with hypoxia    Current Assessment & Plan     6MWT done today, with worsened findings than prior, now drops from 98% to 88% with exertion, required 2.5L to come up to 95% with activity.   Although no resting hypoxia observed, will order portable 02 as significant symptoms of hypoxia which are limiting activity and likely worsening deconditioning. In addition, severe CAHRISMA with minimal efficacy of nasal pillow mask, would likely benefit from overnight 02.  Referred again to Pulmonary Rehab            Cardiac/Vascular    (HFpEF) heart failure with preserved ejection fraction    Current Assessment & Plan     Appears slightly overloaded on  this visit, recommended patient to discuss with his cardiologist increasing dose of daily lasix. Focused on salt intake, patient and his wife don't cook so very limited in terms of food options, trying to eat salads and sandwiches at home.   Continue home ARB, BB.            Endocrine    Morbid obesity with body mass index of 45.0-49.9 in adult    Current Assessment & Plan     Will re-refer to bariatric clinic- discussed medical therapy options to assist with his weight loss, as this is likely worsening both CHARISMA and symptoms of dyspnea, as well as restriction on PFTs.             Other    CHARISMA on CPAP - Primary    Overview     Severe ahi of 29. apap 14-16  Using and benefiting         Current Assessment & Plan     Following with sleep clinic, needs new mask; he will follow up with Sleep  Discussed possibility that weight loss will assist with symptoms, will see Bariatrics again for medical therapies.            Other Visit Diagnoses     Interstitial pulmonary disease, unspecified            Discussed with attending Dr. Marmolejo    RTC 3 months    Blanca Welch MD  PCCM Fellow

## 2022-02-04 NOTE — ASSESSMENT & PLAN NOTE
Spoke with pt and his wife about coupons for [employer].  All coupons we have are , will have to wait until rep comes in with new ones.  Pt verbalized understanding.   Will re-refer to bariatric clinic- discussed medical therapy options to assist with his weight loss, as this is likely worsening both CHARISMA and symptoms of dyspnea, as well as restriction on PFTs.

## 2022-02-04 NOTE — PROGRESS NOTES
77 year old man with a history of asbestos exposure and pleural plaques presenting for follow up.  Non smoker, CHARISMA on CPAP  Restriction on PFTs; suspect his restriction is related to obesity and heart disease.  Significant pedal edema on exam today.  6MWT with desaturation with exertion.    Repeat CT Chest to assess parenchyma.  Obesity Medicine Referral.  Recommended follow up with cardiology regarding volume status.  Instructed to follow up with sleep medicine regarding cpap machine recall.

## 2022-02-04 NOTE — ASSESSMENT & PLAN NOTE
6MWT done today, with worsened findings than prior, now drops from 98% to 88% with exertion, required 2.5L to come up to 95% with activity.   Although no resting hypoxia observed, will order portable 02 as significant symptoms of hypoxia which are limiting activity and likely worsening deconditioning. In addition, severe CHRAISMA with minimal efficacy of nasal pillow mask, would likely benefit from overnight 02.  Referred again to Pulmonary Rehab

## 2022-02-04 NOTE — PROCEDURES
Janusz Montgomery Jr. is a 77 y.o.  male patient, who presents for a 6 minute walk test ordered by MD Rebekah.  The diagnosis is Shortness of Breath; Oxygen Qualification.  The patient's BMI is 46.1 kg/m2. Predicted distance (lower limit of normal) is 207.88 meters.    Test Results:    The test was completed without stopping.  The total time walked was 360 seconds.  During walking, the patient reported:  Dyspnea,Lightheadedness.  The patient used no assistive devices during testing.     02/04/2022---------Distance: 275.84 meters (905 feet)     O2 Sat % Supplemental Oxygen Heart Rate Blood Pressure Fernando Scale   Pre-exercise  (Resting) 98 % Room Air 85 bpm 116/81 mmHg 3   During Exercise 88 % Room Air 135 bpm 186/74 mmHg 10   Post-exercise   97 % Room Air  99 bpm       Recovery Time: 93 seconds    Oxygen Qualification:     O2 Sat % Supplemental Oxygen Heart Rate Blood Pressure Fernando Scale   Pre-exercise  (Resting) 98 % 2 L/M  88 bpm  121/60 mmHg 2    During Exercise   95 %  2 L/M  116 bpm  135/60 mmHg 5-6    Post-exercise   98 %  2 L/M  90 bpm        Performing nurse/tech: Alverto ALTMAN      PREVIOUS STUDY:   06/21/2019---------Distance: 243.84 meters (800 feet)       O2 Sat % Supplemental Oxygen Heart Rate Blood Pressure Fernando Scale   Pre-exercise  (Resting) 95 % Room Air 95 bpm 133/61 mmHg 1   During Exercise 92 % Room Air 126 bpm 156/70 mmHg 4   Post-exercise  (Recovery) 95 % Room Air  110 bpm           CLINICAL INTERPRETATION:  Six minute walk distance is 275.84 meters (905 feet) with maximal severe dyspnea.  During exercise, there was significant desaturation while breathing room air.  Both blood pressure and heart rate increased significantly with walking.  This may represent a hypertensive and a tachycardic response to exercise.  The patient reported non-pulmonary symptoms during exercise.  Significant exercise impairment is likely due to desaturation, cardiovascular causes and subjective symptoms.  The  patient did complete the study, walking 360 seconds of the 360 second test.  The patient may benefit from using supplemental oxygen during exertion.  Since the previous study in June 2019, exercise capacity may be somewhat improved.  Based upon age and body mass index, exercise capacity may be normal.   Oxygen saturation did improve while breathing supplemental oxygen.

## 2022-02-09 NOTE — TELEPHONE ENCOUNTER
Left message to call office back to set up an appt.    CHRISTY Bonilla  Pulm/Sleep [facility] [place]  [phone]

## 2022-02-10 NOTE — TELEPHONE ENCOUNTER
----- Message from Delicia Mcfarlane sent at 2/10/2022 12:50 PM CST -----  Regarding: Delicia with [employer]  I received an order for oxygen, but I do not have a valid diagnosis on the prescription to process.  Please advise.

## 2022-02-21 NOTE — PROGRESS NOTES
"CC:  HPI:Mr. Montgomery is a 77-year-old treated gentleman with a history of PAT, chronic rhinitis, allergic rhinitis, coronary artery disease status post RCA stenting, CHARISMA, herpes simplex keratoconjunctivitis, respiratory failure and squamous cell cancer of the skin of the nose among others who was fit for hearing aids at an outside facility.  He presents today for ear cleaning procedures.   He relates that his granddaughter's dog mutilated his hearing aids and he had to obtain another pair; he got rid of the dog.  He indicates a " covering" feeling of his right ear.   He was last evaluated by me in early June 2020. He was diagnosed with otitis externa of the right ear canal and right ear discomfort;  he was treated with tobramycin ophthalmic drops twice a day for 5-7 days.  He was advised to utilize white vinegar installation of his right ear canal for several weeks thereafter.  I suggested right ear cleaning in 3 months then.  I had irrigated his right ear canal for extraction of a rind of skin and cerumen during an exam dated 02/20/2020.    Past Medical History:   Diagnosis Date    (HFpEF) heart failure with preserved ejection fraction 2/4/2022    NAT (acute kidney injury) 3/19/2017    ALLERGIC RHINITIS     Anemia     Anxiety     Basal cell carcinoma 10/19/2018    forehead and right medial shoulder    Basal cell carcinoma 01/09/2019    left nasal bridge and left posterior ear    Basal cell carcinoma 06/12/2020    left lower medial leg - Efudex    Basal cell carcinoma 11/30/2020    left medial upper eyelid canthus superficial BCC    Chronic rhinitis 5/3/2013    Chronic rhinitis 5/3/2013    Coronary artery disease involving native coronary artery of native heart without angina pectoris s/p RCA stent     Cortical cataract of both eyes 3/18/2016    Delayed sleep phase syndrome 3/13/2019    Depression     Erectile dysfunction 3/24/2014    Erectile dysfunction 3/24/2014    Essential hypertension     " "GERD (gastroesophageal reflux disease) 7/25/2012    Gout, arthritis     Grade III open fracture of left tibia and fibula s/p ex-fix on 7/1/16 and ORIF of left tibia on 7/15 7/6/2016    H/O: iritis     Helicobacter pylori (H. pylori) infection     Treated    Hepatitis     Herpes simplex keratoconjunctivitis 9/30/2015    - on acyclovir - followed by opthalmology, Dr. Uribe     Herpes simplex keratoconjunctivitis 9/30/2015    - on acyclovir - followed by opthalmology, Dr. Uribe     Hyperkalemia 2/28/2017    Hyperlipidemia     Hypogonadism male     Hypogonadism male     Keloid cicatrix     Mixed anxiety and depressive disorder     Morbid obesity     Obstructive sleep apnea on CPAP     CPAP    Osteoarthritis of left knee 7/25/2012    Paroxysmal atrial fibrillation 7/6/2016    Primary osteoarthritis of left knee 7/25/2012    Prominent aorta 1/25/2016    "RESULTS: THE HEART IS MILDLY ENLARGED WITH A SLIGHTLY PROMINENT AORTA" - Xray Chest PA & Lateral 12-     Prostate cancer 2/15/2016    - followed by urology, Dr. Young     Prostate cancer 2/15/2016    - followed by urology, Dr. 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    chest and forehead    Squamous cell cancer of skin of nose     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 7/6/2016    Type III open fracture of left tibia and fibula with routine healing 7/6/2016    Vitamin D deficiency disease     Vitreous detachment 3/18/2016   ALL; Cipro, Zosyn, Bacitracin  Past Surgical History:   Procedure Laterality Date    ADJACENT TISSUE TRANSFER Left 11/30/2020    Procedure: ADJACENT TISSUE TRANSFER x2 ;  Surgeon: Teresa Cooper MD;  Location: [facility] OR [address] [place] [address];  Service: " "Ophthalmology;  Laterality: Left;  Left glabellar 8.5x11x3 and Left upper eyelid advancement flap [id number]      Cardiac stenting x2      CATARACT EXTRACTION W/  INTRAOCULAR LENS IMPLANT Right 3/29/2016    Dr. Conteh    CATARACT EXTRACTION W/  INTRAOCULAR LENS IMPLANT Left 4/12/2016        COLONOSCOPY N/A 7/23/2020    Procedure: COLONOSCOPY;  Surgeon: Nico Lackey MD;  Location: [facility] (2ND FLR);  Service: Endoscopy;  Laterality: N/A;    ESOPHAGOGASTRODUODENOSCOPY N/A 7/23/2020    Procedure: EGD (ESOPHAGOGASTRODUODENOSCOPY);  Surgeon: Nico Lackey MD;  Location: [facility] (2ND FLR);  Service: Endoscopy;  Laterality: N/A;  per Dr Lackey-Will proceed with EGD and colonoscopy on the 2nd floor due to his comorbidities including obesity, sleep apnea, restrictive lung disease, coronary artery disease.  has loop recorder      ok to hold Eliquis 2 days per Dr Sandhu-must remain    EXTERNAL FIXATION TIBIAL FRACTURE Left 07/01/2016    INSERTION OF IMPLANTABLE LOOP RECORDER  04/07/2017    LEFT HEART CATHETERIZATION Left 1/30/2020    Procedure: Left heart cath;  Surgeon: Benjamin Sandhu MD;  Location: [facility] CATH LAB;  Service: Cardiology;  Laterality: Left;  RN PREOP 1/28/20--Pt starting Plavix loading dose today (8pills)- Dr. Sandhu aware.  Pt has a bandaged "non healing area to LLE"--Dr. Sandhu aware.    LEFT HEART CATHETERIZATION Left 2/14/2020    Procedure: Left heart cath, IVUS guided left main / LAD PCI. Noon start, radial approach;  Surgeon: Miguel Angel Brady MD;  Location: [facility] CATH LAB;  Service: Cardiology;  Laterality: Left;  RN PRE OP 2-7-2020  BMI--45.61    ORIF TIBIA FRACTURE Left 07/15/2016    PROBING OF NASOLACRIMAL DUCT WITH INSERTION OF TUBE Left 11/30/2020    Procedure: PROBING, NASOLACRIMAL DUCT,;  Surgeon: Teresa Cooper MD;  Location: [facility] OR 1ST FLR;  Service: Ophthalmology;  Laterality: Left;    RADIOACTIVE SEED IMPLANTATION OF PROSTATE N/A 8/8/2018    Procedure: " INSERTION, RADIOACTIVE SEED, PROSTATE;  Surgeon: Bipin Thompson MD;  Location: [address];  Service: Urology;  Laterality: N/A;  1 hour    SKIN BIOPSY      Squamous cell cancer removal x3 with Mohs surgery      TONSILLECTOMY      TOTAL KNEE ARTHROPLASTY  10/2012    TRIGGER FINGER RELEASE Right 10/14/2020    Procedure: RELEASE, TRIGGER FINGER - right;  Surgeon: Rad Swift MD;  Location: [facility] OR;  Service: Orthopedics;  Laterality: Right;    trus/bx      ULTRASOUND GUIDANCE  2/14/2020    Procedure: Ultrasound Guidance;  Surgeon: Miguel Angel Brady MD;  Location: [facility] CATH LAB;  Service: Cardiology;;         PE:  General:  Alert and oriented gentleman wearing bilateral hearing aids in no acute distress; he removes them the examination and procedures today.  Procedures:  Several pieces cerumen a carefully extracted from the floor of the right ear canal the blunt curette.  The right TM is slightly sclerotic.  A  Veneer of  cerumen is removed from the floor of the left ear canal with a curette.  Left TM is intact.  There is no evidence of otitis externa of either ear canal.  There is no evidence of otomycosis in either ear canal.  Audiometry was not completed today.      DIAGNOSIS:     ICD-10-CM ICD-9-CM    1. Impacted cerumen, unspecified laterality  H61.20 380.4    2. Wears hearing aid in both ears  Z97.4 [id number]    3. Ear discomfort, bilateral  H92.03 388.70    4. History of excessive cerumen  Z78.9 V49.89        PLAN: Scant amount of cerumen removed from both patent eacs with curette  RTC at least yearly for ear cleaning

## 2022-02-22 NOTE — PATIENT INSTRUCTIONS
Scant amount of cerumen removed from both patent eacs with curette  RTC at least yearly for ear cleaning

## 2022-03-02 NOTE — TELEPHONE ENCOUNTER
I sent Mr Montgomery's Oxygen to [facility] on 2-7-22. I have refaxed it and will call [facility]. I let Mr Montgomery know this and sent his message for CT results to Dr Welch. Elizabeth Mccartney LPN

## 2022-03-03 NOTE — PROGRESS NOTES
HPI     Last eye exam was 6/4/21 with Dr. Tobias.  Patient states vision has gotten blurrier and seeing more streaks of light   at night.  Patient denies diplopia, headaches, flashes/floaters, and pain.    AT liquidgel QHS OU    Last edited by Melani Mackenzie MA on 3/2/2022  2:58 PM. (History)            Assessment /Plan     For exam results, see Encounter Report.    Routine eye exam    Astigmatism with presbyopia, bilateral    Pseudophakia of both eyes    Dry eye syndrome of both eyes    Herpes simplex disciform keratitis            1-4.  Patient reports difficulty driving at night.  Does wear alex glasses but currently not working.  Educated pt to start using artificial tears at least 2-3x/day OU and gel at night.  Sill using CPAP machine.  RTC 2-3 weeks for dry eye check and refraction.  If need will prescribe driving glasses.  5.  Continue Acyclovir 800mg PO BID--no signs of flare-up.   Retina flat and intact OU--no holes, tears, breaks, or RDs.  Eye health normal OU.

## 2022-03-14 NOTE — TELEPHONE ENCOUNTER
----- Message from Ashli Cheek sent at 3/14/2022 11:21 AM CDT -----  Type: Patient Call Back    Who called: Bri wife     What is the request in detail: Patient's wife would like to speak with Dr. Gary's nurse her 's upcoming appointment.     Can the clinic reply by [facility]? No     Would the patient rather a call back or a response via [facility]? Call back      Best call back number: [phone]
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
## 2022-03-16 NOTE — ASSESSMENT & PLAN NOTE
-evidence of volume overload on exam.  Currently on lasix 20 mg daily.  Will escalate to 40 mg daily x 1 week.  Patient will notify us if dyspnea improve with escaltion of lasix.

## 2022-03-19 NOTE — PROGRESS NOTES
Subjective:      Patient ID: Janusz Montgomery Jr. is a 77 y.o. male.    Chief Complaint: Nail Care, Foot Problem (Bilateral foot pain ), and Foot Pain    Janusz Montgomery Jr. is a 77 y.o. male returns to clinic for follow up of left foot callus formation where wound was present and concern on if ulceration may be present beneath callus.   Patient has not been moisturizing skin daily.  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 No new pedal complaints.    9/28/2021  presents to the podiatry clinic for care of  left foot ulcer.   Location: plantar distal hallux Onset of the symptoms was several weeks ago. Precipitating event: none known.  History of injury: no Current symptoms include: redness. Signs of infection none   Symptoms have progressed to a point and plateaued. Patient has had prior foot problems. Evaluation to date: seen in ED and states underent I&D and placed on abx Patients rates pain 4/10 on pain scale.      10/12/2021  patient relates he has been attempting to care for toe as instructed.  He relates continued pain top the sites and increased callus formation per wife;. He relates he believes darco shoe may be too large.  Following last encounter had vascular studies.  Vascular surgery consult pending    10/28/2021 Patient relates he has been attempting to care for toe as instructed.  Following last encounter seen by vascular surgery, rendered opinion the patient should be able to heal wound.     12/29/2021 Patient relates he has been attempting to care for toe as instructed. He relates subjective healing.  He has yet to acquire any of the recommended shoe gear. He complains of frequent falls and near falls due to weakness and loss of balance.    03/16/2022 Patient relates he has been attempting to care for toe as instructed. He relates subjective healing.  He has yet to acquire any of the recommended shoe gear. Presents in [facility]    Chief Complaint   Patient presents with    Nail Care    Foot  Problem     Bilateral foot pain     Foot Pain       Hemoglobin A1C   Date Value Ref Range Status   02/11/2020 5.6 4.0 - 5.6 % Final     Comment:     ADA Screening Guidelines:  5.7-6.4%  Consistent with prediabetes  >or=6.5%  Consistent with diabetes  High levels of fetal hemoglobin interfere with the HbA1C  assay. Heterozygous hemoglobin variants (HbS, HgC, etc)do  not significantly interfere with this assay.   However, presence of multiple variants may affect accuracy.     07/11/2016 5.3 4.5 - 6.2 % Final     Comment:     According to ADA guidelines, hemoglobin A1C <7.0% represents  optimal control in non-pregnant diabetic patients.  Different  metrics may apply to specific populations.   Standards of Medical Care in Diabetes - 2016.  For the purpose of screening for the presence of diabetes:  <5.7%     Consistent with the absence of diabetes  5.7-6.4%  Consistent with increasing risk for diabetes   (prediabetes)  >or=6.5%  Consistent with diabetes  Currently no consensus exists for use of hemoglobin A1C  for diagnosis of diabetes for children.     03/31/2015 5.3 4.5 - 6.2 % Final         Current Outpatient Medications on File Prior to Visit   Medication Sig Dispense Refill    acyclovir (ZOVIRAX) 800 MG Tab Take 1 tablet (800 mg total) by mouth 2 (two) times daily. 180 tablet 6    aminophylline 250 mg/10 mL injection       apixaban (ELIQUIS) 5 mg Tab Take 1 tablet (5 mg total) by mouth 2 (two) times daily. 180 tablet 4    artificial tears (ISOPTO TEARS) 0.5 % ophthalmic solution Place 1 drop into both eyes as needed.      atorvastatin (LIPITOR) 40 MG tablet Take 1 tablet (40 mg total) by mouth once daily. 90 tablet 0    clopidogreL (PLAVIX) 75 mg tablet Take 1 tablet (75 mg total) by mouth once daily. 90 tablet 3    CYANOCOBALAMIN, VITAMIN B-12, (VITAMIN B-12 ORAL) Take 2,500 mcg by mouth once daily.      dipyridamole (PERSANTINE) 5 mg/mL injection       doxycycline (VIBRAMYCIN) 100 MG Cap Take 1 po bid  with food and not within 1 hour prior to lying down 28 capsule 0    ELIQUIS 5 mg Tab Take 1 tablet by mouth twice daily 180 tablet 3    fluocinonide 0.05% (LIDEX) 0.05 % cream Apply topically 2 (two) times daily. Prn itch.  Stop using steroid topical when skin is smooth and non itchy. 45 g 0    fluticasone propionate (FLONASE) 50 mcg/actuation nasal spray 1 spray (50 mcg total) by Each Nostril route once daily. 1 Bottle 0    gabapentin (NEURONTIN) 300 MG capsule Take 1 capsule (300 mg total) by mouth 3 (three) times daily. 270 capsule 1    gentamicin (GARAMYCIN) 0.1 % ointment       HYDROcodone-acetaminophen (NORCO) 5-325 mg per tablet Take 1 tablet by mouth every 6 (six) hours as needed for Pain. 10 tablet 0    melatonin 5 mg Tab Take 10 mg by mouth nightly.      metoprolol succinate (TOPROL-XL) 25 MG 24 hr tablet Take 1 tablet by mouth once daily 90 tablet 3    multivitamin (THERAGRAN) per tablet Take 1 tablet by mouth once daily.      mupirocin (BACTROBAN) 2 % ointment AAA lower leg bid after gentle cleansing 30 g 1    nitroGLYCERIN (NITROSTAT) 0.4 MG SL tablet Place 1 tablet (0.4 mg total) under the tongue every 5 (five) minutes as needed for Chest pain. 25 tablet 11    olmesartan (BENICAR) 5 MG Tab Take 1 tablet (5 mg total) by mouth once daily. 90 tablet 3    oxybutynin (DITROPAN-XL) 5 MG TR24 Take 1 tablet (5 mg total) by mouth once daily. 30 tablet 11    sildenafiL (VIAGRA) 100 MG tablet Take 1 tablet (100 mg total) by mouth daily as needed. 10 tablet 11    tamsulosin (FLOMAX) 0.4 mg Cap Take 1 capsule (0.4 mg total) by mouth once daily. 90 capsule 3    triamcinolone acetonide 0.1% (KENALOG) 0.1 % ointment AAA lower legs bid - occlude qhs 454 g 3    venlafaxine (EFFEXOR) 75 MG tablet Take 2 tablets (150 mg total) by mouth 2 (two) times daily. 360 tablet 0    vitamin D 1000 units Tab Take 1 tablet (1,000 Units total) by mouth once daily. (Patient taking differently: Take 2,000 Units by mouth  once daily.)      furosemide (LASIX) 20 MG tablet Take 1 tablet (20 mg total) by mouth once daily. 90 tablet 3    omeprazole (PRILOSEC) 20 MG capsule Take 1 capsule (20 mg total) by mouth daily as needed. 90 capsule 0     No current facility-administered medications on file prior to visit.       Allergies   Allergen Reactions    Ciprofloxacin Rash     Diffuse pruritic morbilliform rash developed 3/15/2017 after dose of cipro; previously in 2/2017 he had rash/fevers after initiation of cipro    Zosyn [Piperacillin-Tazobactam] Anaphylaxis     Diffuse pruritic morbilliform rash developed 3/15/2017.  Then, 430am dose on 3/16 and rash worsened with SOB/tachypnea but no hypoxemia.     Bacitracin Itching and Rash     Violaceous rash in area of topical Tx.        Past Surgical History:   Procedure Laterality Date    ADJACENT TISSUE TRANSFER Left 11/30/2020    Procedure: ADJACENT TISSUE TRANSFER x2 ;  Surgeon: Teresa Cooper MD;  Location: [facility] OR [address] [place] [address];  Service: Ophthalmology;  Laterality: Left;  Left glabellar 8.5x11x3 and Left upper eyelid advancement flap [id number]      Cardiac stenting x2      CATARACT EXTRACTION W/  INTRAOCULAR LENS IMPLANT Right 3/29/2016    Dr. Conteh    CATARACT EXTRACTION W/  INTRAOCULAR LENS IMPLANT Left 4/12/2016        COLONOSCOPY N/A 7/23/2020    Procedure: COLONOSCOPY;  Surgeon: Nico Lackey MD;  Location: [facility] ([address] [place] [address]);  Service: Endoscopy;  Laterality: N/A;    ESOPHAGOGASTRODUODENOSCOPY N/A 7/23/2020    Procedure: EGD (ESOPHAGOGASTRODUODENOSCOPY);  Surgeon: Nico Lackey MD;  Location: [address]);  Service: Endoscopy;  Laterality: N/A;  per Dr Lackey-Will proceed with EGD and colonoscopy on the 2nd floor due to his comorbidities including obesity, sleep apnea, restrictive lung disease, coronary artery disease.  has loop recorder      ok to hold Eliquis 2 days per Dr Sandhu-must remain    EXTERNAL FIXATION TIBIAL FRACTURE Left 07/01/2016  "   INSERTION OF IMPLANTABLE LOOP RECORDER  04/07/2017    LEFT HEART CATHETERIZATION Left 1/30/2020    Procedure: Left heart cath;  Surgeon: Benjamin Sandhu MD;  Location: [facility] CATH LAB;  Service: Cardiology;  Laterality: Left;  RN PREOP 1/28/20--Pt starting Plavix loading dose today (8pills)- Dr. Sandhu aware.  Pt has a bandaged "non healing area to LLE"--Dr. Sandhu aware.    LEFT HEART CATHETERIZATION Left 2/14/2020    Procedure: Left heart cath, IVUS guided left main / LAD PCI. Noon start, radial approach;  Surgeon: Miguel Angel Brady MD;  Location: [facility] CATH LAB;  Service: Cardiology;  Laterality: Left;  RN PRE OP 2-7-2020  BMI--45.61    ORIF TIBIA FRACTURE Left 07/15/2016    PROBING OF NASOLACRIMAL DUCT WITH INSERTION OF TUBE Left 11/30/2020    Procedure: PROBING, NASOLACRIMAL DUCT,;  Surgeon: Teresa Cooper MD;  Location: [facility] OR [address] [place] [address];  Service: Ophthalmology;  Laterality: Left;    RADIOACTIVE SEED IMPLANTATION OF PROSTATE N/A 8/8/2018    Procedure: INSERTION, RADIOACTIVE SEED, PROSTATE;  Surgeon: Bipin Thompson MD;  [facility];  Service: Urology;  Laterality: N/A;  1 hour    SKIN BIOPSY      Squamous cell cancer removal x3 with Mohs surgery      TONSILLECTOMY      TOTAL KNEE ARTHROPLASTY  10/2012    TRIGGER FINGER RELEASE Right 10/14/2020    Procedure: RELEASE, TRIGGER FINGER - right;  Surgeon: Rad Swift MD;  Location: [facility];  Service: Orthopedics;  Laterality: Right;    trus/bx      ULTRASOUND GUIDANCE  2/14/2020    Procedure: Ultrasound Guidance;  Surgeon: Miguel Angel Brady MD;  Location: [facility] CATH LAB;  Service: Cardiology;;       Family History   Problem Relation Age of Onset    Skin cancer Father     Lung cancer Father     Cancer Father         smoker,     Alzheimer's disease Mother     Hypertension Mother     Cancer Sister         colon, lung cancer     No Known Problems Sister     Cancer Brother         skin cancer, polypectomy     Peripheral vascular disease " Unknown     No Known Problems Maternal Aunt     No Known Problems Maternal Uncle     No Known Problems Paternal Aunt     No Known Problems Paternal Uncle     No Known Problems Maternal Grandmother     No Known Problems Maternal Grandfather     No Known Problems Paternal Grandmother     No Known Problems Paternal Grandfather     Melanoma Neg Hx     Psoriasis Neg Hx     Lupus Neg Hx     Eczema Neg Hx     Amblyopia Neg Hx     Blindness Neg Hx     Cataracts Neg Hx     Diabetes Neg Hx     Glaucoma Neg Hx     Macular degeneration Neg Hx     Retinal detachment Neg Hx     Strabismus Neg Hx     Stroke Neg Hx     Thyroid disease Neg Hx     Acne Neg Hx        Social History     Socioeconomic History    Marital status:    Occupational History    Occupation: Retired    Tobacco Use    Smoking status: Never Smoker    Smokeless tobacco: Never Used   Substance and Sexual Activity    Alcohol use: Yes     Alcohol/week: 2.0 standard drinks     Types: 2 Cans of beer per week     Comment: occasionally, beer    Drug use: Never    Sexual activity: Yes     Partners: Female     Birth control/protection: None     Social Determinants of Health     Financial Resource Strain: Low Risk     Difficulty of Paying Living Expenses: Not hard at all   Food Insecurity: No Food Insecurity    Worried About Running Out of Food in the Last Year: Never true    Ran Out of Food in the Last Year: Never true   Transportation Needs: No Transportation Needs    Lack of Transportation (Medical): No    Lack of Transportation (Non-Medical): No   Physical Activity: Inactive    Days of Exercise per Week: 0 days    Minutes of Exercise per Session: 0 min   Stress: No Stress Concern Present    Feeling of Stress : Not at all   Social Connections: Unknown    Frequency of Communication with Friends and Family: Once a week    Frequency of Social Gatherings with Friends and Family: Once a week    Active Member of Clubs or  "Organizations: Yes    Attends Club or Organization Meetings: More than 4 times per year    Marital Status:    Housing Stability: Low Risk     Unable to Pay for Housing in the Last Year: No    Number of Places Lived in the Last Year: 1    Unstable Housing in the Last Year: No       Review of Systems   Constitutional: Negative for chills and fever.   Cardiovascular: Negative for claudication and leg swelling.   Respiratory: Negative for cough and shortness of breath.    Skin: Positive for dry skin, nail changes, rash (baseline) and skin cancer. Negative for itching.   Musculoskeletal: Positive for arthritis, falls, joint pain and myalgias. Negative for joint swelling and muscle weakness.   Gastrointestinal: Negative for diarrhea, nausea and vomiting.   Neurological: Positive for loss of balance and paresthesias. Negative for numbness, tremors and weakness.   Psychiatric/Behavioral: Negative for altered mental status and hallucinations.         Objective:       Vitals:    03/16/22 0917   Weight: (!) 158.3 kg (349 lb)   Height: 6' 1" (1.854 m)   PainSc:   4   PainLoc: Foot       Physical Exam  Nursing note reviewed.   Constitutional:       General: He is not in acute distress.     Appearance: He is not toxic-appearing or diaphoretic.      Comments: Pt. is well-developed, well-nourished, appears stated age, in no acute distress, alert and oriented x 3. No evidence of depression, anxiety, or agitation. Calm, cooperative, and communicative. Appropriate interactions and affect.   Cardiovascular:      Pulses:           Dorsalis pedis pulses are 2+ on the right side and 2+ on the left side.        Posterior tibial pulses are 1+ on the right side and 1+ on the left side.      Comments:     Pulmonary:      Effort: No respiratory distress.   Musculoskeletal:      Right ankle: Swelling present. No tenderness. No lateral malleolus, medial malleolus, AITF ligament, CF ligament or proximal fibula tenderness.      Right " Achilles Tendon: No defects. Zabala's test negative.      Left ankle: Swelling present. No tenderness. No lateral malleolus, medial malleolus, AITF ligament, CF ligament or posterior TF ligament tenderness.      Left Achilles Tendon: No defects. Zabala's test negative.      Right foot: No tenderness or bony tenderness.      Left foot: No tenderness or bony tenderness.      Comments: Decreased stride, station of gait.  apropulsive toe off.  Increased angle and base of gait.    Patient has hammertoes of digits 2-5 bilateral partially reducible without symptom today.     There is equinus deformity bilateral. There is limitation of dorsiflexion with knees extended and with knees flexed.    Shoes reveals lateral heel counter wear bilateral in athletic shoes.        Lymphadenopathy:      Comments: No lymphatic streaking    Negative lymphadenopathy bilateral popliteal fossa and tarsal tunnel.     Skin:     General: Skin is warm and dry.      Coloration: Skin is not pale.      Findings: Lesion and rash present. No bruising, burn or laceration.      Nails: There is no clubbing.      Comments: Post ulcerative callus to posterior left heel and distal left hallux  Signs of infection: none  Drainage:  None  Periwound: intact  Base: thick HPK    Dermatitis and erythema noted to BLE, baseline   Neurological:      Comments: [employer] 5.07 monofilament is intact bilateral feet. Sharp/dull sensation is also intact Bilateral feet. Proprioception is grossly intact. Vibratory sensation intact (pt able to sense vibration stop within 3-5 seconds)     Psychiatric:         Attention and Perception: He is attentive.         Mood and Affect: Mood is not anxious. Affect is not inappropriate.         Speech: He is communicative. Speech is not slurred.         Behavior: Behavior is not combative.         10/28/2021    Ulcer location: distal left hallux  Measurements :0.1x0.1x0.1cm   Signs of infection: none  Drainage: scant  sanguinous  Purulence: no  Crepitus/fluctuance: no  Periwound: Calloused  Base Granular            10/12/2021    Ulcer location: distal left hallux  Measurements : 0.2x0.2x0.2 cm   Signs of infection: none  Drainage: Ulcer dry  Purulence: no  Crepitus/fluctuance: no  Periwound: Calloused  Base: Mixed Granular/Fibrotic  Depth: subcutaneous tissue  Probe to bone: no              9/28/2021    Ulcer location: distal left hallux  Measurements : 0.3x0.2x0.2 cm   Signs of infection: none  Drainage: Ulcer dry  Purulence: no  Crepitus/fluctuance: no  Periwound: Calloused  Base: Mixed Granular/Fibrotic  Depth: subcutaneous tissue  Probe to bone: no              Assessment:       Encounter Diagnoses   Name Primary?    PVD (peripheral vascular disease) Yes    Plantar fat pad atrophy     Pre-ulcerative calluses     Venous stasis of lower extremity     Venous stasis dermatitis of both lower extremities          Plan:       Janusz was seen today for nail care, foot problem and foot pain.    Diagnoses and all orders for this visit:    PVD (peripheral vascular disease)    Plantar fat pad atrophy    Pre-ulcerative calluses    Venous stasis of lower extremity    Venous stasis dermatitis of both lower extremities      Education about the prevention of limb loss.    Discussed wound healing cycle, skin integrity, ways to care for skin.Counseled patient on the effects of PVD on healing. He verbalizes understanding that it can increase the chances of delayed healing and this prolonged exposure leads to infection or progression of infection which subsequently can result in loss of limb.    Wound has healed well with no signs of continued skin breakdown noted. Strongly encouraged recommended shoe gear. Skin is still delicate therefore patient must be diligent in avoiding excessive pressure and making sure there is adequate support and padding in shoe gear.     Detailed home care instructions dispensed    Continue aggressive stretching  and ROM exercise    Follow-up:9-12 weeks but should call [facility] immediately if any signs of infection, such as fever, chills, sweats, increased redness or pain.    Short-term goals include maintaining good offloading and minimizing bioburden, promoting granulation and epithelialization to healing.  Long-term goals include keeping the wound healed by good offloading and medical management under the direction of internist.

## 2022-03-21 PROBLEM — Z91.81 HISTORY OF FALL: Status: ACTIVE | Noted: 2022-01-01

## 2022-03-23 NOTE — PROGRESS NOTES
HPI     Dry Eye      Additional comments: 3 wk POLO and MR ck              Comments     Last eye exam was 3/2/22 with Dr. Tobias.  Patient states dry eye and vision seems to be much better since increase   drops.     AT's BID OU  AT gel QHS OU          Last edited by Melani Mackenzie MA on 3/23/2022 10:15 AM. (History)            Assessment /Plan     For exam results, see Encounter Report.    Dry eye syndrome of both eyes    Presbyopia          1.  Continue artificial tears bid OU and gel at night OU.  2.  Bifocal and distance rx given.

## 2022-03-25 NOTE — PATIENT INSTRUCTIONS
Try to:  A year ultrasound scheduled soon, I will give you a call when I get the results.    General Discharge Instructions   PLEASE READ YOUR DISCHARGE INSTRUCTIONS ENTIRELY AS IT CONTAINS IMPORTANT INFORMATION.  If you were prescribed a narcotic or controlled medication, do not drive or operate heavy equipment or machinery while taking these medications.  If you were prescribed antibiotics, please take them to completion.  You must understand that you've received an Urgent Care treatment only and that you may be released before all your medical problems are known or treated. You, the patient, will arrange for follow up care as instructed.    OVER THE COUNTER RECOMMENDATIONS/SUGGESTIONS.    Make sure to stay well hydrated.    Use Nasal Saline to mechanically move any post nasal drip from your eustachian tube or from the back of your throat.    Use warm salt water gargles to ease your throat pain. Warm salt water gargles as needed for sore throat- 1/2 tsp salt to 1 cup warm water, gargle as desired.    Use an antihistamine such as Claritin, Zyrtec or Allegra to dry you out.    Use pseudoephedrine (behind the counter) to decongest. Pseudoephedrine 30 mg up to 240 mg /day. It can raise your blood pressure and give you palpitations.    Use mucinex (guaifenesin) to break up mucous up to 2400mg/day to loosen any mucous.    The mucinex DM pill has a cough suppressant that can be sedating. It can be used at night to stop the tickle at the back of your throat.    You can use Mucinex D (it has guaifenesin and a high dose of pseudoephedrine) in the mornings to help decongest.    Use Afrin in each nare for no longer than 3 days, as it is addictive. It can also dry out your mucous membranes and cause elevated blood pressure. This is especially useful if you are flying.    Use Flonase 1-2 sprays/nostril per day. It is a local acting steroid nasal spray, if you develop a bloody nose, stop using the medication  immediately.    Sometimes Nyquil at night is beneficial to help you get some rest, however it is sedating and it does have an antihistamine, and tylenol.    Honey is a natural cough suppressant that can be used.    Tylenol up to 4,000 mg a day is safe for short periods and can be used for body aches, pain, and fever. However in high doses and prolonged use it can cause liver irritation.    Ibuprofen is a non-steroidal anti-inflammatory that can be used for body aches, pain, and fever.However it can also cause stomach irritation if over used.     Follow up with your PCP or specialty clinic as instructed in the next 2-3 days if not improved or as needed. You can call [phone] to schedule an appointment with appropriate provider.      If you condition worsens, we recommend that you receive another evaluation at the emergency room immediately or contact your primary medical clinic's after hours call service to discuss your concerns.      Please return here or go to the Emergency Department for any concerns or worsening condition.   You can also call [phone] to schedule an appointment with the appropriate provider.    Please return here or go to the Emergency Department for any concerns or worsening of condition.    Thank you for choosing [facility]!    Our goal in the Urgent Care is to always provide outstanding medical care. You may receive a survey by mail or e-mail in the next week regarding your experience today. We would greatly appreciate you completing and returning the survey. Your feedback provides us with a way to recognize our staff who provide very good care, and it helps us learn how to improve when your experience was below our aspiration of excellence.      We appreciate you trusting us with your medical care. We hope you feel better soon. We will be happy to take care of you for all of your future medical needs.    Sincerely,    WINIFRED Hoffmann

## 2022-03-25 NOTE — PROGRESS NOTES
"Subjective:       Patient ID: Janusz Montgomery Jr. is a 77 y.o. male.    Vitals:  height is 6' 1" (1.854 m) and weight is 156.5 kg (345 lb) (abnormal). His temperature is 97.2 °F (36.2 °C). His blood pressure is 129/74 and his pulse is 63. His respiration is 20 and oxygen saturation is 95%.     Chief Complaint: Mass    Patient stated the bump on his neck started a few weeks ago.  He stated it is starting to hurt more around it.  He does not have any redness or swelling by the area from the outside of his neck but he has knot on his right lower neck.  He stated he has stage 3 kidney disease.   Provider note begins below:  Past Medical History:  2/4/2022: (HFpEF) heart failure with preserved ejection fraction  3/19/2017: NAT (acute kidney injury)  No date: ALLERGIC RHINITIS  No date: Anemia  No date: Anxiety  10/19/2018: Basal cell carcinoma      Comment:  forehead and right medial shoulder  01/09/2019: Basal cell carcinoma      Comment:  left nasal bridge and left posterior ear  06/12/2020: Basal cell carcinoma      Comment:  left lower medial leg - Efudex  11/30/2020: Basal cell carcinoma      Comment:  left medial upper eyelid canthus superficial BCC  5/3/2013: Chronic rhinitis  5/3/2013: Chronic rhinitis  No date: Coronary artery disease involving native coronary artery of   native heart without angina pectoris s/p RCA stent  3/18/2016: Cortical cataract of both eyes  3/13/2019: Delayed sleep phase syndrome  No date: Depression  3/24/2014: Erectile dysfunction  3/24/2014: Erectile dysfunction  No date: Essential hypertension  7/25/2012: GERD (gastroesophageal reflux disease)  No date: Gout, arthritis  7/6/2016: Grade III open fracture of left tibia and fibula s/p ex-fix   on 7/1/16 and ORIF of left tibia on 7/15  No date: H/O: iritis  No date: Helicobacter pylori (H. pylori) infection      Comment:  Treated  No date: Hepatitis  9/30/2015: Herpes simplex keratoconjunctivitis      Comment:  - on acyclovir - followed " "by opthalmology, Dr. Uribe   9/30/2015: Herpes simplex keratoconjunctivitis      Comment:  - on acyclovir - followed by opthalmologyDr. Uribe   2/28/2017: Hyperkalemia  No date: Hyperlipidemia  No date: Hypogonadism male  No date: Hypogonadism male  No date: Keloid cicatrix  No date: Mixed anxiety and depressive disorder  No date: Morbid obesity  No date: Obstructive sleep apnea on CPAP      Comment:  CPAP  7/25/2012: Osteoarthritis of left knee  7/6/2016: Paroxysmal atrial fibrillation  7/25/2012: Primary osteoarthritis of left knee  1/25/2016: Prominent aorta      Comment:  "RESULTS: THE HEART IS MILDLY ENLARGED WITH A SLIGHTLY                PROMINENT AORTA" - Xray Chest PA & Lateral 12-   2/15/2016: Prostate cancer      Comment:  - followed by urology, Dr. Young   2/15/2016: Prostate cancer      Comment:  - followed by urology, Dr. Young   No date: PVD (peripheral vascular disease)  3/18/2016: Refractive error  2/4/2022: Respiratory failure, unspecified with hypoxia  06/29/2021: SCC (squamous cell carcinoma)      Comment:  R trap  No date: Skin disease  No date: Skin ulcer  10/2015: Squamous cell cancer of buccal mucosa      Comment:  chest and forehead  No date: Squamous cell cancer of skin of nose  7/6/2016: Traumatic type III open fracture of shaft of left tibia and   fibula with nonunion  7/6/2016: Type III open fracture of left tibia and fibula with   routine healing  No date: Vitamin D deficiency disease  3/18/2016: Vitreous detachment   Patient with above medical history reports for past 3 weeks he has noticed a bump on the right side of his neck, he notices it is painful, reports a history of prostate cancer denies any fever, chills, body aches.  Denies any recent weight loss.  He does have a history of kidney disease.  denies any lymph node swelling in his arm pits or any other areas that he is aware of.    Mass  This is a new problem. The current episode started 1 to 4 weeks ago. The " problem has been gradually worsening. Associated symptoms include neck pain. Pertinent negatives include no abdominal pain, anorexia, arthralgias, change in bowel habit, chest pain, chills, congestion, coughing, diaphoresis, fatigue, fever, headaches, joint swelling, myalgias, nausea, numbness, rash, sore throat, swollen glands, urinary symptoms, vertigo, visual change, vomiting or weakness. He has tried nothing for the symptoms. The treatment provided no relief.       Constitution: Negative for activity change, appetite change, chills, sweating, fatigue, fever, unexpected weight change and generalized weakness.   HENT: Negative for congestion and sore throat.    Neck: Positive for neck pain and painful lymph nodes.   Cardiovascular: Negative for chest pain.   Respiratory: Negative for cough.    Gastrointestinal: Negative for abdominal pain, nausea and vomiting.   Musculoskeletal: Negative for joint pain, joint swelling and muscle ache.   Skin: Negative for rash.   Neurological: Negative for history of vertigo, headaches and numbness.   Hematologic/Lymphatic: Positive for swollen lymph nodes.       Objective:      Physical Exam   Constitutional: He is oriented to person, place, and time. He appears well-developed.  Non-toxic appearance. He does not appear ill. No distress. obesity  HENT:   Head: Normocephalic and atraumatic.   Ears:   Right Ear: External ear normal.   Left Ear: External ear normal.   Nose: Nose normal.   Mouth/Throat: Oropharynx is clear and moist.   Eyes: Conjunctivae, EOM and lids are normal. Pupils are equal, round, and reactive to light.   Neck: Trachea normal and phonation normal. Neck supple.       Musculoskeletal: Normal range of motion.         General: Normal range of motion.   Lymphadenopathy:     He has cervical adenopathy.        Right cervical: Deep cervical adenopathy present.   Neurological: He is alert and oriented to person, place, and time.   Skin: Skin is warm, dry, intact and not  diaphoretic.   Psychiatric: His speech is normal and behavior is normal. Judgment and thought content normal.   Nursing note and vitals reviewed.        Assessment:       1. LAD (lymphadenopathy) of right cervical region          Plan:       Given all other comorbidities I will hold off on any antibiotics considering no fever, chills, body aches, redness, warmth at this time, symptoms x 3 weeks, we discussed ordering an ultrasound, and he would like me to proceed with ordering this for him.  He will notify his PCP on Monday as well of complaint, but notes difficulty getting in with PCP.     Discussed results/diagnosis/plan with patient in clinic. Strict precautions given to patient to monitor for worsening signs and symptoms. Advised to follow up with PCP or specialist.    Explained side effects of medications prescribed with patient and informed him/her to discontinue use if he/she has any side effects and to inform UC or PCP if this occurs. All questions answered. Strict ED verses clinic return precautions stressed and given in depth. Advised if symptoms worsens of fail to improve he/she should go to the Emergency Room. Discharge and follow-up instructions given verbally/printed with the patient who expressed understanding and willingness to comply with my recommendations. Patient voiced understanding and in agreement with current treatment plan. Patient exits the exam room in no acute distress. Conversant and engaged during discharge discussion, verbalized understanding.      LAD (lymphadenopathy) of right cervical region  -     US Soft Tissue Head Neck Thyroid; Future; Expected date: 03/25/2022                 Patient Instructions   Try to:  A year ultrasound scheduled soon, I will give you a call when I get the results.    General Discharge Instructions   PLEASE READ YOUR DISCHARGE INSTRUCTIONS ENTIRELY AS IT CONTAINS IMPORTANT INFORMATION.  If you were prescribed a narcotic or controlled medication, do not  drive or operate heavy equipment or machinery while taking these medications.  If you were prescribed antibiotics, please take them to completion.  You must understand that you've received an Urgent Care treatment only and that you may be released before all your medical problems are known or treated. You, the patient, will arrange for follow up care as instructed.    OVER THE COUNTER RECOMMENDATIONS/SUGGESTIONS.    Make sure to stay well hydrated.    Use Nasal Saline to mechanically move any post nasal drip from your eustachian tube or from the back of your throat.    Use warm salt water gargles to ease your throat pain. Warm salt water gargles as needed for sore throat- 1/2 tsp salt to 1 cup warm water, gargle as desired.    Use an antihistamine such as Claritin, Zyrtec or Allegra to dry you out.    Use pseudoephedrine (behind the counter) to decongest. Pseudoephedrine 30 mg up to 240 mg /day. It can raise your blood pressure and give you palpitations.    Use mucinex (guaifenesin) to break up mucous up to 2400mg/day to loosen any mucous.    The mucinex DM pill has a cough suppressant that can be sedating. It can be used at night to stop the tickle at the back of your throat.    You can use Mucinex D (it has guaifenesin and a high dose of pseudoephedrine) in the mornings to help decongest.    Use Afrin in each nare for no longer than 3 days, as it is addictive. It can also dry out your mucous membranes and cause elevated blood pressure. This is especially useful if you are flying.    Use Flonase 1-2 sprays/nostril per day. It is a local acting steroid nasal spray, if you develop a bloody nose, stop using the medication immediately.    Sometimes Nyquil at night is beneficial to help you get some rest, however it is sedating and it does have an antihistamine, and tylenol.    Honey is a natural cough suppressant that can be used.    Tylenol up to 4,000 mg a day is safe for short periods and can be used for body aches,  pain, and fever. However in high doses and prolonged use it can cause liver irritation.    Ibuprofen is a non-steroidal anti-inflammatory that can be used for body aches, pain, and fever.However it can also cause stomach irritation if over used.     Follow up with your PCP or specialty clinic as instructed in the next 2-3 days if not improved or as needed. You can call [phone] to schedule an appointment with appropriate provider.      If you condition worsens, we recommend that you receive another evaluation at the emergency room immediately or contact your primary medical clinic's after hours call service to discuss your concerns.      Please return here or go to the Emergency Department for any concerns or worsening condition.   You can also call [phone] to schedule an appointment with the appropriate provider.    Please return here or go to the Emergency Department for any concerns or worsening of condition.    Thank you for choosing [facility]!    Our goal in the Urgent Care is to always provide outstanding medical care. You may receive a survey by mail or e-mail in the next week regarding your experience today. We would greatly appreciate you completing and returning the survey. Your feedback provides us with a way to recognize our staff who provide very good care, and it helps us learn how to improve when your experience was below our aspiration of excellence.      We appreciate you trusting us with your medical care. We hope you feel better soon. We will be happy to take care of you for all of your future medical needs.    Sincerely,    WINIFRED Hoffmann

## 2022-03-29 PROBLEM — J96.91 RESPIRATORY FAILURE, UNSPECIFIED WITH HYPOXIA: Status: RESOLVED | Noted: 2022-01-01 | Resolved: 2022-01-01

## 2022-03-29 NOTE — TELEPHONE ENCOUNTER
Spoke with NP Bhavana Ambrosio with UC she states pt had an concerning lymph node on US of neck and wanted advice from pt's PCP on the next steps to take.PCP wasn't in clinic at the moment she was able to speak with another provider for assistance.

## 2022-03-29 NOTE — TELEPHONE ENCOUNTER
Spoke with pt's wife informed her labs were ordered for pt.An appt for labs were scheduled for today.Also informed her a referral was placed for ENT and referrals dept will reach out to schedule appt.

## 2022-03-29 NOTE — TELEPHONE ENCOUNTER
Received message from urgent care.     Patient with abnormal appearing lymph node in neck, recommended CT w contrast. Patient has CKD3. I recommend that we obtain CBC, flow cytometry, and refer to ENT for possible biopsy/evaluation. Will defer to ENT for radiologic imaging.

## 2022-03-29 NOTE — TELEPHONE ENCOUNTER
US Soft Tissue Head Neck Thyroid    Result Date: 3/28/2022  EXAMINATION: US SOFT TISSUE HEAD NECK THYROID CLINICAL HISTORY: Localized enlarged lymph nodes TECHNIQUE: Ultrasound of the thyroid and cervical lymph nodes was performed. COMPARISON: None. FINDINGS: Palpable area of concern right lateral lower neck, adjacent lymph nodes of 1.8 x 1.6 x 1.8, with abnormal central, limited increased vascularity and architecture.  2.9 x 2.3 x 2.2 cm with abnormal central architecture no unusual vascularity.  And 0.9 cm greatest dimension, normal appearing.  Contralateral node for comparison 1 x 0.4 x 1 cm.     Two abnormal appearing enlarged lymph nodes right neck base of uncertain etiology and significance.  No corresponding abnormality noted on CT scan of chest 02/22/2022 and 2020. Etiologies include inflammatory, infectious and neoplastic change including lymphoma. Consideration for CT scan of neck with IV contrast suggested for further evaluation. This report was flagged in Epic as abnormal. Electronically signed by: Baldemar Burgos MD Date:    03/28/2022 Time:    16:14    Called and discussed ultrasound results with patient's wife, she verified name and date of birth, she was aware of the results from the portal and reached out to PCP already.  I will place a referral to ENT, patient does have a history of chronic kidney disease, it is recommended for him to have a CT with contrast.  I have also reached out to his PCP Dr. Weathers for further evaluation.  My number was left with her office staff for a call back, for further recommendations.  I did call clinic  as well to try and schedule an appointment with ENT for this patient, no available appointments at this time that her urgent, triage nurse will contact patient with further evaluation and recommendations.

## 2022-03-29 NOTE — TELEPHONE ENCOUNTER
Received a call from 1 of Dr. Weathers's  colleagues Dr. Cannon, reviewed the ultrasound with him, labs will be ordered by this physician.  I updated patient and spoke Ms. Gregory and she verbalized understanding, will try to get the blood work today.   Pt with mild oral dysphagia characterized by increased oral processing/mastication with moist solid consistencies, likely 2/2 debility.  Pharyngeal phase of swallow WFL with no overt signs & symptoms of airway protection deficits across consistencies tested.  Pt deemed safe to resume po diet with aspiration precautions.

## 2022-03-30 NOTE — PROGRESS NOTES
The patient location is:  Patient Home.  The chief complaint leading to consultation is: Results (Ultrasound of neck)   .  Visit type: Virtual visit with synchronous audio and video.  Total time spent with patient and wife: 10 minutes.  Each patient to whom he or she provides medical services by telemedicine is:  (1) informed of the relationship between the physician and patient and the respective role of any other health care provider with respect to management of the patient; and (2) notified that he or she may decline to receive medical services by telemedicine and may withdraw from such care at any time.      HISTORY OF PRESENT ILLNESS:  Janusz Montgomery Jr. is a 77 y.o. male who was seen virtually today for Results (Ultrasound of neck)  .  The patient was seen today for a virtual visit to discuss results of recent ultrasound of his neck.  The patient states that a few weeks ago he noticed a lump in the right supraclavicular region.  Initially it was a little uncomfortable when he slept on that area, but now the pain seems to be present all the time.  He states the area is non mobile, slightly warm, but not red.  There has been no discharge.  Went to urgent care for further evaluation.  They did an ultrasound which revealed two abnormal appearing enlarged lymph nodes right neck base of uncertain etiology and significance.  He has known asbestosis and history of prostate cancer.  He denies any recent fevers, night sweats, or unexplained changes in his weight.  He is otherwise asymptomatic.  Ultrasound that was recently done recommended CT scan with contrast.  Patient has known chronic kidney disease stage 3.         PAST MEDICAL HISTORY:  Past Medical History:   Diagnosis Date    (HFpEF) heart failure with preserved ejection fraction 2/4/2022    NAT (acute kidney injury) 3/19/2017    ALLERGIC RHINITIS     Anemia     Anxiety     Basal cell carcinoma 10/19/2018    forehead and right medial shoulder    Basal  "cell carcinoma 01/09/2019    left nasal bridge and left posterior ear    Basal cell carcinoma 06/12/2020    left lower medial leg - Efudex    Basal cell carcinoma 11/30/2020    left medial upper eyelid canthus superficial BCC    Chronic rhinitis 5/3/2013    Chronic rhinitis 5/3/2013    Coronary artery disease involving native coronary artery of native heart without angina pectoris s/p RCA stent     Cortical cataract of both eyes 3/18/2016    Delayed sleep phase syndrome 3/13/2019    Depression     Erectile dysfunction 3/24/2014    Erectile dysfunction 3/24/2014    Essential hypertension     GERD (gastroesophageal reflux disease) 7/25/2012    Gout, arthritis     Grade III open fracture of left tibia and fibula s/p ex-fix on 7/1/16 and ORIF of left tibia on 7/15 7/6/2016    H/O: iritis     Helicobacter pylori (H. pylori) infection     Treated    Hepatitis     Herpes simplex keratoconjunctivitis 9/30/2015    - on acyclovir - followed by opthalmologyDr. Uribe     Herpes simplex keratoconjunctivitis 9/30/2015    - on acyclovir - followed by opthalmology, Dr. Uribe     Hyperkalemia 2/28/2017    Hyperlipidemia     Hypogonadism male     Hypogonadism male     Keloid cicatrix     Mixed anxiety and depressive disorder     Morbid obesity     Obstructive sleep apnea on CPAP     CPAP    Osteoarthritis of left knee 7/25/2012    Paroxysmal atrial fibrillation 7/6/2016    Primary osteoarthritis of left knee 7/25/2012    Prominent aorta 1/25/2016    "RESULTS: THE HEART IS MILDLY ENLARGED WITH A SLIGHTLY PROMINENT AORTA" - Xray Chest PA & Lateral 12-     Prostate cancer 2/15/2016    - followed by urology, Dr. Young     Prostate cancer 2/15/2016    - followed by urology, Dr. Young     PVD (peripheral vascular disease)     Refractive error 3/18/2016    Respiratory failure, unspecified with hypoxia 2/4/2022    SCC (squamous cell carcinoma) 06/29/2021    R trap    Skin disease     " "Skin ulcer     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    chest and forehead    Squamous cell cancer of skin of nose     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 7/6/2016    Type III open fracture of left tibia and fibula with routine healing 7/6/2016    Vitamin D deficiency disease     Vitreous detachment 3/18/2016       PAST SURGICAL HISTORY:  Past Surgical History:   Procedure Laterality Date    ADJACENT TISSUE TRANSFER Left 11/30/2020    Procedure: ADJACENT TISSUE TRANSFER x2 ;  Surgeon: Teresa Cooper MD;  [facility];  Service: Ophthalmology;  Laterality: Left;  Left glabellar 8.5x11x3 and Left upper eyelid advancement flap [id number]      Cardiac stenting x2      CATARACT EXTRACTION W/  INTRAOCULAR LENS IMPLANT Right 3/29/2016    Dr. Conteh    CATARACT EXTRACTION W/  INTRAOCULAR LENS IMPLANT Left 4/12/2016        COLONOSCOPY N/A 7/23/2020    Procedure: COLONOSCOPY;  Surgeon: Nico Lackey MD;  [facility]  Service: Endoscopy;  Laterality: N/A;    ESOPHAGOGASTRODUODENOSCOPY N/A 7/23/2020    Procedure: EGD (ESOPHAGOGASTRODUODENOSCOPY);  Surgeon: Nico Lackey MD;  [facility]  Service: Endoscopy;  Laterality: N/A;  per Dr Lackey-Will proceed with EGD and colonoscopy on the 2nd floor due to his comorbidities including obesity, sleep apnea, restrictive lung disease, coronary artery disease.  has loop recorder      ok to hold Eliquis 2 days per Dr Sandhu-must remain    EXTERNAL FIXATION TIBIAL FRACTURE Left 07/01/2016    INSERTION OF IMPLANTABLE LOOP RECORDER  04/07/2017    LEFT HEART CATHETERIZATION Left 1/30/2020    Procedure: Left heart cath;  Surgeon: Benjamin Sandhu MD;  Location[facility] LAB;  Service: Cardiology;  Laterality: Left;  RN PREOP 1/28/20--Pt starting Plavix loading dose today (8pills)- Dr. Sandhu aware.  Pt has a bandaged "non healing area to LLE"--Dr. Sandhu " aware.    LEFT HEART CATHETERIZATION Left 2/14/2020    Procedure: Left heart cath, IVUS guided left main / LAD PCI. Noon start, radial approach;  Surgeon: Miguel Angel Brady MD;  [facility] C[facility];  Service: Cardiology;  Laterality: Left;  RN PRE OP 2-7-2020  BMI--45.61    ORIF TIBIA FRACTURE Left 07/15/2016    PROBING OF NASOLACRIMAL DUCT WITH INSERTION OF TUBE Left 11/30/2020    Procedure: PROBING, NASOLACRIMAL DUCT,;  Surgeon: Teresa Cooper MD;  Location: [address];  Service: Ophthalmology;  Laterality: Left;    RADIOACTIVE SEED IMPLANTATION OF PROSTATE N/A 8/8/2018    Procedure: INSERTION, RADIOACTIVE SEED, PROSTATE;  Surgeon: Bipin Thompson MD;  Location: [facility] OR [address] [place] [address];  Service: Urology;  Laterality: N/A;  1 hour    SKIN BIOPSY      Squamous cell cancer removal x3 with Mohs surgery      TONSILLECTOMY      TOTAL KNEE ARTHROPLASTY  10/2012    TRIGGER FINGER RELEASE Right 10/14/2020    Procedure: RELEASE, TRIGGER FINGER - right;  Surgeon: Rad Swift MD;  Location: [facility];  Service: Orthopedics;  Laterality: Right;    trus/bx      ULTRASOUND GUIDANCE  2/14/2020    Procedure: Ultrasound Guidance;  Surgeon: Miguel Angel Brady MD;  Location[facility] C[facility] LAB;  Service: Cardiology;;       SOCIAL HISTORY:  Social History     Socioeconomic History    Marital status:    Occupational History    Occupation: Retired    Tobacco Use    Smoking status: Never Smoker    Smokeless tobacco: Never Used   Substance and Sexual Activity    Alcohol use: Yes     Alcohol/week: 2.0 standard drinks     Types: 2 Cans of beer per week     Comment: occasionally, beer    Drug use: Never    Sexual activity: Yes     Partners: Female     Birth control/protection: None     Social Determinants of Health     Financial Resource Strain: Low Risk     Difficulty of Paying Living Expenses: Not hard at all   Food Insecurity: No Food Insecurity    Worried About Running Out of Food in the Last Year: Never true    Ran  Out of Food in the Last Year: Never true   Transportation Needs: No Transportation Needs    Lack of Transportation (Medical): No    Lack of Transportation (Non-Medical): No   Physical Activity: Inactive    Days of Exercise per Week: 0 days    Minutes of Exercise per Session: 0 min   Stress: No Stress Concern Present    Feeling of Stress : Not at all   Social Connections: Unknown    Frequency of Communication with Friends and Family: Twice a week    Frequency of Social Gatherings with Friends and Family: Twice a week    Active Member of Clubs or Organizations: Yes    Attends Club or Organization Meetings: More than 4 times per year    Marital Status:    Housing Stability: Low Risk     Unable to Pay for Housing in the Last Year: No    Number of Places Lived in the Last Year: 1    Unstable Housing in the Last Year: No       FAMILY HISTORY:  Family History   Problem Relation Age of Onset    Skin cancer Father     Lung cancer Father     Cancer Father         smoker,     Alzheimer's disease Mother     Hypertension Mother     Cancer Sister         colon, lung cancer     No Known Problems Sister     Cancer Brother         skin cancer, polypectomy     Peripheral vascular disease Unknown     No Known Problems Maternal Aunt     No Known Problems Maternal Uncle     No Known Problems Paternal Aunt     No Known Problems Paternal Uncle     No Known Problems Maternal Grandmother     No Known Problems Maternal Grandfather     No Known Problems Paternal Grandmother     No Known Problems Paternal Grandfather     Melanoma Neg Hx     Psoriasis Neg Hx     Lupus Neg Hx     Eczema Neg Hx     Amblyopia Neg Hx     Blindness Neg Hx     Cataracts Neg Hx     Diabetes Neg Hx     Glaucoma Neg Hx     Macular degeneration Neg Hx     Retinal detachment Neg Hx     Strabismus Neg Hx     Stroke Neg Hx     Thyroid disease Neg Hx     Acne Neg Hx        ALLERGIES AND MEDICATIONS: updated and reviewed.    Review of patient's allergies indicates:   Allergen Reactions    Ciprofloxacin Rash     Diffuse pruritic morbilliform rash developed 3/15/2017 after dose of cipro; previously in 2/2017 he had rash/fevers after initiation of cipro    Zosyn [piperacillin-tazobactam] Rash     Diffuse pruritic morbilliform rash developed 3/15/2017.  Then, 430am dose on 3/16 and rash worsened with SOB/tachypnea but no hypoxemia.     Bacitracin Itching and Rash     Violaceous rash in area of topical Tx.      Medication List with Changes/Refills   Current Medications    ACYCLOVIR (ZOVIRAX) 800 MG TAB    Take 1 tablet (800 mg total) by mouth 2 (two) times daily.    AMINOPHYLLINE 250 MG/10 ML INJECTION        APIXABAN (ELIQUIS) 5 MG TAB    Take 1 tablet (5 mg total) by mouth 2 (two) times daily.    ARTIFICIAL TEARS (ISOPTO TEARS) 0.5 % OPHTHALMIC SOLUTION    Place 1 drop into both eyes as needed.    ATORVASTATIN (LIPITOR) 40 MG TABLET    Take 1 tablet (40 mg total) by mouth once daily.    CLOPIDOGREL (PLAVIX) 75 MG TABLET    Take 1 tablet (75 mg total) by mouth once daily.    CYANOCOBALAMIN, VITAMIN B-12, (VITAMIN B-12 ORAL)    Take 2,500 mcg by mouth once daily.    DIPYRIDAMOLE (PERSANTINE) 5 MG/ML INJECTION        DOXYCYCLINE (VIBRAMYCIN) 100 MG CAP    Take 1 po bid with food and not within 1 hour prior to lying down    ELIQUIS 5 MG TAB    Take 1 tablet by mouth twice daily    FLUOCINONIDE 0.05% (LIDEX) 0.05 % CREAM    Apply topically 2 (two) times daily. Prn itch.  Stop using steroid topical when skin is smooth and non itchy.    FLUTICASONE PROPIONATE (FLONASE) 50 MCG/ACTUATION NASAL SPRAY    1 spray (50 mcg total) by Each Nostril route once daily.    FUROSEMIDE (LASIX) 20 MG TABLET    Take 1 tablet (20 mg total) by mouth once daily.    GABAPENTIN (NEURONTIN) 300 MG CAPSULE    Take 1 capsule (300 mg total) by mouth 3 (three) times daily.    GENTAMICIN (GARAMYCIN) 0.1 % OINTMENT        HYDROCODONE-ACETAMINOPHEN (NORCO) 5-325 MG PER  TABLET    Take 1 tablet by mouth every 6 (six) hours as needed for Pain.    MELATONIN 5 MG TAB    Take 10 mg by mouth nightly.    METOPROLOL SUCCINATE (TOPROL-XL) 25 MG 24 HR TABLET    Take 1 tablet by mouth once daily    MULTIVITAMIN (THERAGRAN) PER TABLET    Take 1 tablet by mouth once daily.    MUPIROCIN (BACTROBAN) 2 % OINTMENT    AAA lower leg bid after gentle cleansing    NITROGLYCERIN (NITROSTAT) 0.4 MG SL TABLET    Place 1 tablet (0.4 mg total) under the tongue every 5 (five) minutes as needed for Chest pain.    OLMESARTAN (BENICAR) 5 MG TAB    Take 1 tablet (5 mg total) by mouth once daily.    OMEPRAZOLE (PRILOSEC) 20 MG CAPSULE    Take 1 capsule (20 mg total) by mouth daily as needed.    OXYBUTYNIN (DITROPAN-XL) 5 MG TR24    Take 1 tablet (5 mg total) by mouth once daily.    SILDENAFIL (VIAGRA) 100 MG TABLET    Take 1 tablet (100 mg total) by mouth daily as needed.    TAMSULOSIN (FLOMAX) 0.4 MG CAP    Take 1 capsule (0.4 mg total) by mouth once daily.    TRIAMCINOLONE ACETONIDE 0.1% (KENALOG) 0.1 % OINTMENT    AAA lower legs bid - occlude qhs    VENLAFAXINE (EFFEXOR) 75 MG TABLET    Take 2 tablets (150 mg total) by mouth 2 (two) times daily.    VITAMIN D 1000 UNITS TAB    Take 1 tablet (1,000 Units total) by mouth once daily.          CARE TEAM:  Patient Care Team:  Miguelina Weathers MD as PCP - General (Internal Medicine)  Navdeep Guardado MD (Cardiology)  Luis Palacio MD as Consulting Physician (Cardiology)  Jessica Herndon MD (Inactive) (Nephrology)  Colt Sosa MD as Consulting Physician (Plastic Surgery)  AURELIANO Minor MD as Consulting Physician (Urology)  Walter Cortés MD as Consulting Physician (Orthopedic Surgery)  Miguelina Weathers MD as Hypertension Digital Medicine Responsible Provider (Internal Medicine)  Jocelyne Monge, PharmD as Hypertension Digital Medicine Clinician (Pharmacist)  Miguel Smith MD as Consulting Physician (Interventional Cardiology)  Naomy RECIO  Donley, LPN as Licensed Practical Nurse  Jose Francisco Sawyer MD as Consulting Physician (Rheumatology)  Sandra Gary DPM as Consulting Physician (Podiatry)  MOE Smalls (Inactive) as Audiologist (Audiology)  Fredo Manrique MD (Inactive) as Resident (Pulmonary Disease)  Fabio Knutson III, MD as Consulting Physician (Otolaryngology)  Hermelinda Mcclure MD as Consulting Physician (Dermatology)  Dayanara Reed as Digital Medicine Health   Radha Leroy RD as Dietitian (Nutrition)  Astrid Sapp Managed Medicare as Hypertension Digital Medicine Contract  Suzie CrossD as Hyperlipidemia Digital Medicine Clinician (Pharmacist)  Miguelina Weathers MD as Hyperlipidemia Digital Medicine Responsible Provider (Internal Medicine)         REVIEW OF SYSTEMS:  Review of Systems   Constitutional: Negative for activity change and unexpected weight change.   HENT: Negative for hearing loss, rhinorrhea and trouble swallowing.    Eyes: Negative for discharge and visual disturbance.   Respiratory: Negative for chest tightness and wheezing.    Cardiovascular: Negative for chest pain and palpitations.   Gastrointestinal: Negative for blood in stool, constipation, diarrhea and vomiting.   Endocrine: Negative for polydipsia and polyuria.   Genitourinary: Negative for difficulty urinating, hematuria and urgency.   Musculoskeletal: Positive for neck pain. Negative for arthralgias and joint swelling.   Neurological: Negative for weakness and headaches.   Psychiatric/Behavioral: Negative for confusion and dysphoric mood.         PHYSICAL EXAM:   General appearance - alert, well appearing, and in no distress  Psychiatric - alert, oriented to person, place and time, normal speech  Neck - he appeared to have fairly good range of motion of his neck  Unable to perform remainder of PE as this was a video visit     LABS:  No labs needed at this time    ASSESSMENT AND PLAN:  1. Lymphadenopathy of head and neck  region  I discussed with the patient and his wife that I am unsure of the etiology of these lymph nodes.  They may be inflammatory or infectious.  We cannot rule out malignancy at this time.  I discussed that the recommendation is to do a CT scan with contrast, but I am concerned about his decreased kidney function.  It appears that the lymph nodes are in an area that might be easily amenable to biopsy.  I will therefore refer him to a general surgeon to discuss if this is a possibility.  If not, then we will proceed with CT scan with contrast.  The patient and wife voiced understanding and agreement with our plan for further workup.  - Ambulatory referral/consult to General Surgery; Future    2. Stage 3b chronic kidney disease  Stable decreased kidney function. Observe. Patient counseled to avoid/minimize the use of anti-inflammatory  Medication. Discussed to stay well hydrated. Also discussed with patient that good control of blood pressure and/or diabetes, if present, will help to prevent progression.    3. Asbestosis  Stable. Asymptomatic. Observe.    4. H/O prostate cancer  Doing well status post pellet treatment in 2018.  Followed by urology.           Follow up in about 3 months (around 6/30/2022), or if symptoms worsen or fail to improve, for follow up chronic medical conditions.. or sooner as needed.

## 2022-03-31 NOTE — PROGRESS NOTES
OTOLARYNGOLOGY CLINIC NOTE  Date:  03/31/2022     Chief complaint:  Chief Complaint   Patient presents with    Lymphadenopathy     Right side of neck lymphadenopathy ,painful .        History of Present Illness  Janusz Montgomery Jr. is a 77 y.o. male  presenting today for a new evaluation and treatment of right neck mass. The mass has been getting more sore, but he is  not sure if enlarging. Has history of many skin cancers (multiple types he thinks squamous and basal cell but not sure if /does not think he has had melanoma or merkel cell ca) in the head and neck region.     Sometimes has slight sore throat which he attributes to use of sleep apnea machine however he has noted that the sore throat has been happening  more regularly as of recent. No issue with swallowing. No hemoptysis. No weight loss.     Review of medical records and prior documentation  Past medical records were reviewed with data pertinent to the chief complaint summarized in the HPI. Information obtained from review of medical records is attributed to respective sources in the HPI with reference to sources of information at their mention. Records reviewed included all recent notes from referring provider, primary care, and related subspecialty evaluations as available. This review of records was performed and additional data obtained to supplement history obtained from the patient and further inform medical decision making involved in formulating a plan of care accounting for all history and treatment relevant to the issues addressed.    Past Medical History  Past Medical History:   Diagnosis Date    (HFpEF) heart failure with preserved ejection fraction 2/4/2022    NAT (acute kidney injury) 3/19/2017    ALLERGIC RHINITIS     Anemia     Anxiety     Basal cell carcinoma 10/19/2018    forehead and right medial shoulder    Basal cell carcinoma 01/09/2019    left nasal bridge and left posterior ear    Basal cell carcinoma 06/12/2020    left  "lower medial leg - Efudex    Basal cell carcinoma 11/30/2020    left medial upper eyelid canthus superficial BCC    Chronic rhinitis 5/3/2013    Chronic rhinitis 5/3/2013    Coronary artery disease involving native coronary artery of native heart without angina pectoris s/p RCA stent     Cortical cataract of both eyes 3/18/2016    Delayed sleep phase syndrome 3/13/2019    Depression     Erectile dysfunction 3/24/2014    Erectile dysfunction 3/24/2014    Essential hypertension     GERD (gastroesophageal reflux disease) 7/25/2012    Gout, arthritis     Grade III open fracture of left tibia and fibula s/p ex-fix on 7/1/16 and ORIF of left tibia on 7/15 7/6/2016    H/O: iritis     Helicobacter pylori (H. pylori) infection     Treated    Hepatitis     Herpes simplex keratoconjunctivitis 9/30/2015    - on acyclovir - followed by opthalmology, Dr. Uribe     Herpes simplex keratoconjunctivitis 9/30/2015    - on acyclovir - followed by opthalmology, Dr. Uribe     Hyperkalemia 2/28/2017    Hyperlipidemia     Hypogonadism male     Hypogonadism male     Keloid cicatrix     Mixed anxiety and depressive disorder     Morbid obesity     Obstructive sleep apnea on CPAP     CPAP    Osteoarthritis of left knee 7/25/2012    Paroxysmal atrial fibrillation 7/6/2016    Primary osteoarthritis of left knee 7/25/2012    Prominent aorta 1/25/2016    "RESULTS: THE HEART IS MILDLY ENLARGED WITH A SLIGHTLY PROMINENT AORTA" - Xray Chest PA & Lateral 12-     Prostate cancer 2/15/2016    - followed by urology, Dr. Young     Prostate cancer 2/15/2016    - followed by urology, Dr. 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    chest and forehead    Squamous cell cancer of skin " "of nose     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 7/6/2016    Type III open fracture of left tibia and fibula with routine healing 7/6/2016    Vitamin D deficiency disease     Vitreous detachment 3/18/2016        Past Surgical History  Past Surgical History:   Procedure Laterality Date    ADJACENT TISSUE TRANSFER Left 11/30/2020    Procedure: ADJACENT TISSUE TRANSFER x2 ;  Surgeon: Teresa Cooper MD;  Location: [facility] OR 1ST FLR;  Service: Ophthalmology;  Laterality: Left;  Left glabellar 8.5x11x3 and Left upper eyelid advancement flap [id number]      Cardiac stenting x2      CATARACT EXTRACTION W/  INTRAOCULAR LENS IMPLANT Right 3/29/2016    Dr. Conteh    CATARACT EXTRACTION W/  INTRAOCULAR LENS IMPLANT Left 4/12/2016        COLONOSCOPY N/A 7/23/2020    Procedure: COLONOSCOPY;  Surgeon: Nico Lackey MD;  Location: [facility] (2ND FLR);  Service: Endoscopy;  Laterality: N/A;    ESOPHAGOGASTRODUODENOSCOPY N/A 7/23/2020    Procedure: EGD (ESOPHAGOGASTRODUODENOSCOPY);  Surgeon: Nico Lackey MD;  [facility]  Service: Endoscopy;  Laterality: N/A;  per Dr Lackey-Will proceed with EGD and colonoscopy on the 2nd floor due to his comorbidities including obesity, sleep apnea, restrictive lung disease, coronary artery disease.  has loop recorder      ok to hold Eliquis 2 days per Dr Sandhu-must remain    EXTERNAL FIXATION TIBIAL FRACTURE Left 07/01/2016    INSERTION OF IMPLANTABLE LOOP RECORDER  04/07/2017    LEFT HEART CATHETERIZATION Left 1/30/2020    Procedure: Left heart cath;  Surgeon: Benjamin Sandhu MD;  Location: [facility] CATH LAB;  Service: Cardiology;  Laterality: Left;  RN PREOP 1/28/20--Pt starting Plavix loading dose today (8pills)- Dr. Sandhu aware.  Pt has a bandaged "non healing area to LLE"--Dr. Sandhu aware.    LEFT HEART CATHETERIZATION Left 2/14/2020    Procedure: Left heart cath, IVUS guided left main / LAD PCI. " Noon start, radial approach;  Surgeon: Miguel Angel Brady MD;  Location: [facility] CATH LAB;  Service: Cardiology;  Laterality: Left;  RN PRE OP 2-7-2020  BMI--45.61    ORIF TIBIA FRACTURE Left 07/15/2016    PROBING OF NASOLACRIMAL DUCT WITH INSERTION OF TUBE Left 11/30/2020    Procedure: PROBING, NASOLACRIMAL DUCT,;  Surgeon: Teresa Cooper MD;  Location: [facility] OR [address] [place] [address];  Service: Ophthalmology;  Laterality: Left;    RADIOACTIVE SEED IMPLANTATION OF PROSTATE N/A 8/8/2018    Procedure: INSERTION, RADIOACTIVE SEED, PROSTATE;  Surgeon: Bipin Thompson MD;  [facility];  Service: Urology;  Laterality: N/A;  1 hour    SKIN BIOPSY      Squamous cell cancer removal x3 with Mohs surgery      TONSILLECTOMY      TOTAL KNEE ARTHROPLASTY  10/2012    TRIGGER FINGER RELEASE Right 10/14/2020    Procedure: RELEASE, TRIGGER FINGER - right;  Surgeon: Rad Swift MD;  Location: [facility];  Service: Orthopedics;  Laterality: Right;    trus/bx      ULTRASOUND GUIDANCE  2/14/2020    Procedure: Ultrasound Guidance;  Surgeon: Miguel Angel Brady MD;  Location: [facility] CATH LAB;  Service: Cardiology;;        Medications  Current Outpatient Medications on File Prior to Visit   Medication Sig Dispense Refill    acyclovir (ZOVIRAX) 800 MG Tab Take 1 tablet (800 mg total) by mouth 2 (two) times daily. 180 tablet 6    aminophylline 250 mg/10 mL injection       apixaban (ELIQUIS) 5 mg Tab Take 1 tablet (5 mg total) by mouth 2 (two) times daily. 180 tablet 4    artificial tears (ISOPTO TEARS) 0.5 % ophthalmic solution Place 1 drop into both eyes as needed.      atorvastatin (LIPITOR) 40 MG tablet Take 1 tablet (40 mg total) by mouth once daily. 90 tablet 0    clopidogreL (PLAVIX) 75 mg tablet Take 1 tablet (75 mg total) by mouth once daily. 90 tablet 3    CYANOCOBALAMIN, VITAMIN B-12, (VITAMIN B-12 ORAL) Take 2,500 mcg by mouth once daily.      dipyridamole (PERSANTINE) 5 mg/mL injection       doxycycline (VIBRAMYCIN) 100 MG  Cap Take 1 po bid with food and not within 1 hour prior to lying down 28 capsule 0    ELIQUIS 5 mg Tab Take 1 tablet by mouth twice daily 180 tablet 3    fluocinonide 0.05% (LIDEX) 0.05 % cream Apply topically 2 (two) times daily. Prn itch.  Stop using steroid topical when skin is smooth and non itchy. 45 g 0    fluticasone propionate (FLONASE) 50 mcg/actuation nasal spray 1 spray (50 mcg total) by Each Nostril route once daily. 1 Bottle 0    furosemide (LASIX) 20 MG tablet Take 1 tablet (20 mg total) by mouth once daily. 90 tablet 3    gabapentin (NEURONTIN) 300 MG capsule Take 1 capsule (300 mg total) by mouth 3 (three) times daily. 270 capsule 1    gentamicin (GARAMYCIN) 0.1 % ointment       HYDROcodone-acetaminophen (NORCO) 5-325 mg per tablet Take 1 tablet by mouth every 6 (six) hours as needed for Pain. (Patient not taking: Reported on 3/30/2022) 10 tablet 0    melatonin 5 mg Tab Take 10 mg by mouth nightly.      metoprolol succinate (TOPROL-XL) 25 MG 24 hr tablet Take 1 tablet by mouth once daily 90 tablet 3    multivitamin (THERAGRAN) per tablet Take 1 tablet by mouth once daily.      mupirocin (BACTROBAN) 2 % ointment AAA lower leg bid after gentle cleansing 30 g 1    nitroGLYCERIN (NITROSTAT) 0.4 MG SL tablet Place 1 tablet (0.4 mg total) under the tongue every 5 (five) minutes as needed for Chest pain. 25 tablet 11    olmesartan (BENICAR) 5 MG Tab Take 1 tablet (5 mg total) by mouth once daily. 90 tablet 3    omeprazole (PRILOSEC) 20 MG capsule Take 1 capsule (20 mg total) by mouth daily as needed. 90 capsule 0    oxybutynin (DITROPAN-XL) 5 MG TR24 Take 1 tablet (5 mg total) by mouth once daily. 30 tablet 11    sildenafiL (VIAGRA) 100 MG tablet Take 1 tablet (100 mg total) by mouth daily as needed. (Patient not taking: Reported on 3/30/2022) 10 tablet 11    tamsulosin (FLOMAX) 0.4 mg Cap Take 1 capsule (0.4 mg total) by mouth once daily. 90 capsule 3    triamcinolone acetonide 0.1%  (KENALOG) 0.1 % ointment AAA lower legs bid - occlude qhs 454 g 3    venlafaxine (EFFEXOR) 75 MG tablet Take 2 tablets (150 mg total) by mouth 2 (two) times daily. 360 tablet 0    vitamin D 1000 units Tab Take 1 tablet (1,000 Units total) by mouth once daily. (Patient taking differently: Take 2,000 Units by mouth once daily.)       No current facility-administered medications on file prior to visit.       Review of Systems  Review of Systems   Constitutional: Negative for fever and weight loss.   HENT: Positive for sore throat (intermittent).         No voice changes, no otalgia   Respiratory: Negative for hemoptysis.    Musculoskeletal: Positive for neck pain.   Endo/Heme/Allergies:        +lymph node enlargement        Social History   reports that he has never smoked. He has never used smokeless tobacco. He reports current alcohol use of about 2.0 standard drinks of alcohol per week. He reports that he does not use drugs.     Family History  Family History   Problem Relation Age of Onset    Skin cancer Father     Lung cancer Father     Cancer Father         smoker,     Alzheimer's disease Mother     Hypertension Mother     Cancer Sister         colon, lung cancer     No Known Problems Sister     Cancer Brother         skin cancer, polypectomy     Peripheral vascular disease Unknown     No Known Problems Maternal Aunt     No Known Problems Maternal Uncle     No Known Problems Paternal Aunt     No Known Problems Paternal Uncle     No Known Problems Maternal Grandmother     No Known Problems Maternal Grandfather     No Known Problems Paternal Grandmother     No Known Problems Paternal Grandfather     Melanoma Neg Hx     Psoriasis Neg Hx     Lupus Neg Hx     Eczema Neg Hx     Amblyopia Neg Hx     Blindness Neg Hx     Cataracts Neg Hx     Diabetes Neg Hx     Glaucoma Neg Hx     Macular degeneration Neg Hx     Retinal detachment Neg Hx     Strabismus Neg Hx     Stroke Neg Hx     Thyroid  disease Neg Hx     Acne Neg Hx         Physical Exam   Vitals:    03/31/22 1553   BP: 132/70    Body mass index is 46.12 kg/m².          GENERAL: no acute distress.  HEAD: normocephalic. multiple scars form skin cancer removals on both sides of face and neck   EYES: lids and lashes normal. No scleral icterus  EARS: external ear without lesion, normal pinna shape and position.  External auditory canal with normal cerumen, tympanic membrane fully visible, no perforation , no retraction. No middle ear effusion. Ossicles intact.   NOSE: external nose without significant bony abnormality  ORAL CAVITY/OROPHARYNX: tongue midline and mobile. Symmetric palate rise. Uvula midline.   NECK: trachea midline.   LYMPH NODES:firm tender level 5 mass x2 mobile slightly upper, slightly fixed lower ; scar adjacent to node ( patient is not sure if this was from a prior skin cancer excision however)  Scar adjacent on enck   RESPIRATORY: no stridor, no stertor. Voice normal. Respirations nonlabored.  NEURO: alert, responds to questions appropriately.   Cranial nerve exam as indicated in above sections and additionally showed facial movement symmetric with good eye closure and symmetric smile.   PSYCH:mood appropriate    PROCEDURE NOTE  NAME OF PROCEDURE: Flexible Laryngoscopy, diagnostic  INDICATIONS: gag reflex precludes mirror exam, throat pain in adult, neck mass  FINDINGS: no malignancy noted  velopharynx collapse/narrowing and pharyngeal redundancy typical anatomy in patients with obstructive sleep apnea, turbinate hypertrophy and deviated septum     Consent: After procedure was explained in detail and all questions answered, verbal consent was obtained for performing flexible laryngoscopy.  Anesthesia: topical 4% lidocaine and neosynephrine  Procedure: With patient in seated position, the scope was inserted into the bilateral nasal passageway and advanced atraumatically into the nasopharynx to examine the following  structures:  Nasal cavity: Turbinates with mild hypertrophy. Deviated septum  No purulent drainage.   Nasopharynx: no mass or lesion noted in nasopharynx.   velopharynx collapse/narrowing and pharyngeal redundancy typical anatomy in patients with obstructive sleep apnea  Oropharynx: base of tongue without  mass or ulceration. Lingual tonsils normal in appearance  Hypopharynx: posterior pharyngeal wall without mass or lesion. No pooling of secretions. Pyriform sinuses visible without mass or lesion  Larynx: epiglottis normal without lesion. False vocal folds without edema/erythema/lesion. True vocal folds mobile and without lesion. Mild interarytenoid edema no erythema . Postcricoid region with minimal to no edema no lesion   Subglottis: visualized portion of subglottis normal in appearance    After examination performed, the scope was removed atraumatically . The patient tolerated the procedure well. Photodocumentation obtained with representative images below, all images and/or videos uploaded in media section of epic.  Right turbinate hypertrophy and caudal deflection of septum to left    Left nasal cavity showing Deviated septum , dry nasal tissue, no active bleeding but pinpoint spots of blood    nasopharynx    velopharynx and pharyngeal redundancy    Right base of tongue without mass or lesion     Left base of tongue without mass or lesion     Vocal folds abducted - thick mucous on vocal fold      Vocal folds adducted    Vocal folds abducted with clearance of thick mucous    Imaging:  The patient does have any pertinent and/or recent imaging of the head and neck. Ultrasound images and report reviewed.   Representative image screenshot below      Labs:  CBC  Recent Labs   Lab 06/11/21  1311 12/01/21  1324 03/29/22  1400   WBC 11.14 12.22 11.38   Hemoglobin 11.7 L 13.0 L 11.4 L   Hematocrit 36.8 L 39.8 L 34.1 L    H 104 H 104 H   Platelets 193 183 174     BMP  Recent Labs   Lab 06/22/20  1306  08/17/20  1448 08/25/20  0349 08/26/20  0335 08/26/20  1226 08/27/20  0607 08/31/20  1418 03/11/21  1439 06/11/21  1311 12/01/21  1324 01/10/22  1332 01/28/22  1455   Glucose 95   < > 86 103   < > 96   < > 118 H 101 97 97  97 98   Sodium 139   < > 142 143   < > 142   < > 139 137 141 137  137 141   Potassium 5.2 H   < > 5.1 5.3 H   < > 4.9   < > 4.4 4.2 3.9 4.6  4.6 4.3   Chloride 107   < > 111 H 113 H   < > 109   < > 106 104 102 103  103 106   CO2 26   < > 22 L 20 L   < > 26   < > 21 L 23 25 25  25 25   BUN 32 H   < > 55 H 46 H   < > 40 H   < > 30 H 24 H 23 27 H  27 H 26 H   Creatinine 1.6 H   < > 2.9 H 2.6 H   < > 2.3 H   < > 1.4 1.3 1.5 H 2.0 H  2.0 H 1.5 H   Calcium 9.1   < > 8.1 L 8.7   < > 8.8   < > 9.1 9.1 9.4 9.6  9.6 8.9   Phosphorus  --    < > 5.1 H 4.5  --  4.3   < > 3.3 2.8 2.6 L  --   --    Magnesium  --    < > 2.1 2.0  --  1.8  --   --   --   --   --   --    Prealbumin 18 L  --   --   --   --   --   --   --   --   --   --   --     < > = values in this interval not displayed.     COAGS  Recent Labs   Lab 01/28/20  1508   INR 1.0       Assessment  1. Cervical lymphadenopathy  - BASIC METABOLIC PANEL; Future  - IR US FNA Biopsy, 1st Lesion; Future  - CT Soft Tissue Neck With Contrast; Future    2. Throat pain in adult    3. CHARISMA on CPAP    4. Personal history of skin cancer       Plan:  Discussed plan of care with patient in detail and all questions answered. Patient reported understanding of plan of care.   Sore throat, intermittent: agree with patient could be due to drying issue from sleep apnea machine- tissues dry and mucous dry on scope. Can try pectin lozenges. Increase hydration and decrease caffeine    Neck mass: FNA ordered for workup. I think he is going to need surgical intervention - either excisional node biopsy if concern for lymphoma vs neck dissection if metastatic cutaneous carcinoma. I am much more suspicious of metastatic cutaneous carcinoma . Potentially could also be primary  pharyngeal/oral/ laryngeal primary but would be  Extremely unusual - scope exam negative ( although hpv disease can present with primary that is difficult/unable to be found on scope) so negative scope  in and of itself would not rule out hpv related primary with cervical mets; suspect hpv primary  is much less likely given isolated level 5 nodes ( extremely unusual for primary laryngopharyng disease from hpv + or negative disease to present with isolated level 5 nodes) and significant cutaneous cancer history; much more common for this to present with isolated level 5 nodes. Will consider operative direct laryngoscopy depending on imaging and fna - has redundant tissue from CHARISMA but no overt lesion noted, however DL can provide much better views and ability for palpation     Discussed with him importance of CT neck with contrast for workup and treatment planning. Per american academy of radiology and nephrology guidelines, ok for contrast with EGFR over 30 but I have also contacted his nephrologist per his request to confirm it is ok for contrast. there is much more expansive dataset in the literature  regarding CT imaging of the cervical nodes in the neck in adults in evaluation for malignancy compared to ultrasound . However his ultrasound does have concerning features for malignancy such as lack of vascular core. There is debate in size criteria in literature for ultrasound and when to perform FNA , this has been better established with ct scan . Discussed with patient that based on ultrasound findings as noted above and exam coupled with his history that he needs expeditious workup for malignancy as I do not suspect that this is infectious source ( which is highly unusual in adults anyways) . We discussed that CT would also allow for better evaluation of pharynx/larynx /oropharynx ( this is recommended in addition to scope that was performed) as well as for surgical planning - see above.     F/u urgently after  FNA and ct completed.     I spent a total of 35 minutes on the day of the visit.  This includes face to face time and non-face to face time preparing to see the patient (eg, review of tests), obtaining and/or reviewing separately obtained history, documenting clinical information in the electronic or other health record, independently interpreting results and communicating results to the patient/family/caregiver, or care coordinator.    Please be aware that this note has been generated with the assistance of MModal voice-to-text.  Please excuse any spelling or grammatical errors.

## 2022-03-31 NOTE — PATIENT INSTRUCTIONS
Can try sugar free lozenges with pectin ,  such as halls fruit breezers    Clearing your throat leads to more swelling in the voice box.  This will worsen your symptoms.  You should try to minimize throat clearing as much as possible.    Avoid mouthwash with alcohol such as listerine. You should use biotene mouth wash    You should aim to drink 2 to 3 liters of water daily if you are drinking one cup of coffee.  If you have trouble drinking water, you can cut back or cut out caffeine.

## 2022-04-01 NOTE — TELEPHONE ENCOUNTER
No new care gaps identified.  Powered by [employer]. Reference number: [id number].   4/01/2022 5:39:46 AM CDT

## 2022-04-04 NOTE — TELEPHONE ENCOUNTER
----- Message from Malini Pagan NP sent at 4/4/2022 10:10 AM CDT -----  Regarding: RE: CT scan  Hello,    I would recommend excellent hydration for 24 hours before and after the contrast is administered. Also remind him to be avoiding NSAIDs, especially for 48 hours before contrast administration.  He should have creatinine checked prior to administration to make sure it remains within his baseline range of 1.3-1.5.  If he has an NAT, contrast should be held.  Risks and benefits of using contrast should also be discussed with patient.    ThanksMalini  ----- Message -----  From: Kelley Cueva LPN  Sent: 3/31/2022   4:31 PM CDT  To: Malini Pagan NP, #  Subject: CT scan                                          Good Afternoon    The attached patient was seen today in our office. Dr Higgins would like him to have a CT scan. She wants to know if it is okay for him to have a CT with contrast.      Thanks  Kelley

## 2022-04-04 NOTE — PROGRESS NOTES
Spoke with Jose Alberto in Ct dept. about scheduling patient with Ct w/ contrast. Ct does not give IV fluids before procedure. Will refuse to perform procedure if GFR is less than 30. Reach ou to Ej via staff message see her response below.        I would recommend excellent hydration for 24 hours before and after the contrast is administered. Also remind him to be avoiding NSAIDs, especially for 48 hours before contrast administration.  He should have creatinine checked prior to administration to make sure it remains within his baseline range of 1.3-1.5.  If he has an NAT, contrast should be held.  Risks and benefits of using contrast should also be discussed with patient.

## 2022-04-05 NOTE — TELEPHONE ENCOUNTER
----- Message from Kristie Newby MA sent at 4/5/2022  1:08 PM CDT -----  Type: Patient Call Back    Who called:wife Ruth Gregory    What is the request in detail:states pt. Is still in a lot of pain with his neck.. she is asking what else can he take..     Can the clinic reply by [facility]?no    Would the patient rather a call back or a response via [facility]? yes    Best call back number:[phone] (home)

## 2022-04-05 NOTE — TELEPHONE ENCOUNTER
Spoke with patient's wife she states patient is having a dull throbbing pain in his neck. She states he is currently taking tylenol for the pain with isn't helping. I advised that I would forward message to provider.

## 2022-04-05 NOTE — TELEPHONE ENCOUNTER
I do not think he would be able to take ibuprofen with his kidney disease.  I do not normally do long term narcotic management but I will send something in and refer him to pain management as I am concerned about malignancy and I do not think his pain will be controlled until we can get additional information and treatment started.

## 2022-04-05 NOTE — TELEPHONE ENCOUNTER
SPOKE WITH PATIENT'S WIFE TO INFORM HER DR PENA SENT A PRESCRIPTION FOR PAIN MEDICATION (NORCO) TO [facility] AND ALSO REFERRED HIM TO PAIN MANAGEMENT.

## 2022-04-07 NOTE — TELEPHONE ENCOUNTER
Attempted to call patient to schedule IR procedure. Unable to reach patient, a voicemail was left with our contact information ([phone]).

## 2022-04-08 NOTE — TELEPHONE ENCOUNTER
LEFT [id number] FOR PATIENT TO CALL THE OFFICE. IR SCHEDULED FNA WITH PATIENT FOR 4/28 AND WE SCHEDULED FOLLOW UP APPTS FOR 5/9 AND 5/13 FOR RESULTS. ONE APPT NEEDS TO BE CANCELED BASED ON WHEN THE FNA RESULTS ARE IN.
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
## 2022-04-13 NOTE — PROCEDURES
OTOLARYNGOLOGY PROCEDURE NOTE       Ultrasound guided fine needle aspiration of right cervical adenopathy    Date/Time: 4/13/2022 4:15 PM  Performed by: Etta Higgins MD      Consent Done?:  Yes (Written)  Local anesthesia used?: Yes    Anesthesia:  Local infiltration  Local anesthetic:  Lidocaine 1% with epinephrine  Anesthetic total (ml):  2    Indication for procedure: This is a 77 year old male with firm painful neck mass , palpable on right side. CT scan showed necrotic lymphadenopathy concerning for malignancy. We discussed pros/cons/risks/alternatives and indications for procedure including but not limited to nondiagnostic sample requiring repeat FNA, need for further surgery and/or treatment, hematoma, infection, bleeding, pain.     Procedure in detail: after prepping the skin , the ultrasound probe was used to identify cervical lymphadenopathy in level 5. The skin was marked at the area of planned needle sample. The skin was infiltrated with 1 cc of 1% lidocaine with epi. Next under ultrasound guidance, 1 cc of lidocaine with epi was injected around the projected path of needle biopsy.    An 18 gauge needle was used for sample to be placed in RPMI solution. Under ultrasound guidance 3 passes were performed, one using capillary technique and 2 using aspiration technique. New needle and syringe used for each pass. After each pass specimen was ejected into RPMI and needle rinsed and ejected back into RPMI solution.     For sample with slides, a 22 gauge needle was used. Again this was done using ultrasound guidance to confirm placement of needle into lymph node in non-necrotic appearing regions. 3 passes performed, one with capillary technique and 2 with aspiration technique. Aspirate was placed on a total of 4 slides and placed in fixative material.     Compression was held at the needle site for 5 minutes. There was no swelling nor bleeding.     Bleeding:  Minimal    Patient tolerance:  Patient  tolerated the procedure well with no immediate complications    Counseled on after care and si/sx to notify me of . I will see him back next week to review results. Will likely need surgical mgmt - discussed if squamous cell cancer, likely would need neck dissection, if lymphoma suspected may need excisional node biopsy

## 2022-04-19 NOTE — TELEPHONE ENCOUNTER
SPOKE WITH PATIENT EXPLAINED TO HIM THAT HIS RESULTS WERE NOT IN YET FOR THE FNA HE HAD THAT WE NEED TO PUSH HIS APPOINTMENT BACK UNTIL NEXT WEEK. RESCHEDULED IT FOR 4/26 AT 4PM WITH DR PENA. WILL REACH OUT SOONER IF RESULTS COME IN PRIOR IN WHICH HIS APPOINTMENT WILL NEED TO BE MOVED UP.

## 2022-04-21 NOTE — TELEPHONE ENCOUNTER
Called to discuss results of fna and tumor board recs from tumor board presentation today. Discussed next steps and will discuss in further detail when he sees me on Tuesday

## 2022-04-22 NOTE — TELEPHONE ENCOUNTER
----- Message from Colt Lester sent at 4/22/2022  3:37 PM CDT -----  Patient called to speak w/ someone in regards to advice on alternate options for pain medication. Requesting callback [phone]

## 2022-04-22 NOTE — TELEPHONE ENCOUNTER
Tramadol can be an option, dose is limited in the setting of kidney disease but can be an option. Hydrocodone- acetaminophen can be used in the setting of kidney disease.   Malini will be back in the office early next week so will defer your specific question to her but from kidney standpoint, it is ok to take current pain medicine.     Thanks.    Message text

## 2022-04-22 NOTE — PROGRESS NOTES
Cardiac clearance request for surgery for poss May 4th. maría elena walked clearance letter down to dr maryjane CHISHOLM

## 2022-04-22 NOTE — PROGRESS NOTES
Presenting Hospital / Clinic: [facility]  Virtual Tumor Board Conference: Virtual  Presenter: Dr. Higgins  Date Presented to Tumor Board: 4/21/2022  Specialties Present: Medical Oncology; Radiation Oncology; Nutrition; Head and Neck; Speech Pathology  Presentation at Cancer Conference: Prospective  Cancer Type: Head and neck cancer  Recommended Plan Note: neck dissection (level II-V), DL

## 2022-04-23 NOTE — TELEPHONE ENCOUNTER
Pt states he was recently diagnosed with cancer in neck and lymph nodes. Has appt Tuesday, 4/27/22 with pain management. Pt states he is taking norco 5/325, but that they only relieve the pain temporarily, and wear off quickly.  States he wants a stronger pain medication.     Pt reports that he is currently sitting outside of an UC, where he attempted to obtain a medication. States he was told him that they are unable to write him any scripts and to report to the ED.  Explained to pt that we are unable to obtain scripts after hrs for controlled substances, and to follow  advice to report to ED for treatment plan. Verbalizes understanding.     Reason for Disposition   Caller requesting a CONTROLLED substance prescription refill (e.g., narcotics, ADHD medicines)    Protocols used: MEDICATION REFILL AND RENEWAL CALL-A-AH

## 2022-04-25 NOTE — PROGRESS NOTES
Cardiac clearance from Dr Sandhu for patient's surgery on TBD poss 5/4 . Cleared moderated risk ok to hold Eliquis 3 days & Plavix 5 days.

## 2022-04-25 NOTE — TELEPHONE ENCOUNTER
----- Message from Maria Ines Anderson sent at 4/25/2022  1:56 PM CDT -----  Type: Patient Call Back    Who called: Wife Ruth Gregory    What is the request in detail: requesting to speak with doctor in regards to  pain medication. Please advise    Can the clinic reply by [facility]? No     Would the patient rather a call back or a response via [facility]? Call     Best call back number:.[phone]

## 2022-04-25 NOTE — TELEPHONE ENCOUNTER
Pt sent in request for plavix but it has already been filled yesterday by Dr. Sandhu, so I refused it.

## 2022-04-25 NOTE — TELEPHONE ENCOUNTER
Spoke with patient's wife she states the patient is out of his pain medication and it did not control the pain. Informed her Dr Higgins was prescribing a small quantity of a different pain medication until patient sees pain management on Wednesday.

## 2022-04-26 NOTE — TELEPHONE ENCOUNTER
Spoke with patient to move his appt from today to tomorrow 4/27 due to Dr. Higgins being stuck in Surgery. Reschedule patient to a virtual appointment tomorrow.    Patient was asking about different pain medicine. Stating the medicine you prescribed him yesterday (percocet 5-325) is not working. explaiend to him that you couldn't precribe anything stronger that, he would need to wait for his pain management appointment tomorrow. States that he spoke with them when his appointment was scheduled about pain medicine. They states that they don't have a doctor to prescribe medicine as per the patient. Patient knows that you are stuck in surgery until 7pm tonight. Please advise

## 2022-04-26 NOTE — TELEPHONE ENCOUNTER
This message is for Mr. Montgomery in regards to his/her appointment 04/27/2022 at 3:30pm    [facility] Policy: For the safety of all patients and staff members. Please review [facility] Patient Visitor policy below.    Patient Visitor Policy:     Due to social distancing and limited seating, the clinic staff asks that all patients arrive on time for their scheduled appointment.  During this visit, we ask all patients to only have one visitor over the age of 18yrs old to accompany them to be seen by Dr. Pito Claros.  If the patient does not require assistance with their visit, all visitors remain in the waiting area.  Upon arriving, patients and visitors are required to wear a face mask.  If the patient or visitor does not have a face mask, one will be provided to you when entering the facility.      [facility] providers and Mid-levels offer interventional, procedure-based options to treat chronic pain.  The goal is to manage chronic pain by reducing pain frequency and intensity and address your functional goals for activities of daily living while simultaneously reducing or eliminating your reliance on medications. Please bring any records or imaging from prior treatments to visit for provider review.  You will be presented with a multi-modal treatment plan that may or may not include imaging, interventional procedures, Physical/occupational/aqua therapy, pain creams, and non-narcotic pain medications used for the treatments of chronic pain.    Address: [facility] [place] [address]    Thank you,  Interventional Pain Staff  [phone]    Patient verbalized understanding

## 2022-04-26 NOTE — PROGRESS NOTES
Chronic Pain - New Consult    Referring Physician: Etta Higgins MD    Date: 04/27/2022     Re: Janusz Montgomery Jr.  MR#: [id number]  YOB: 1945  Age: 77 y.o.    Chief Complaint:   Chief Complaint   Patient presents with    Shoulder Pain     Right      **This note is dictated using the M*Modal Fluency Direct word recognition program. There are word recognition mistakes that are occasionally missed on review.**    ASSESSMENT: 77 y.o. year old male with cancer pain, consistent with     1. Cancer related pain  oxyCODONE-acetaminophen (PERCOCET)  mg per tablet   2. Pain  Ambulatory referral/consult to Pain Clinic   3. Neck mass  Ambulatory referral/consult to Pain Clinic   4. Head and neck cancer  oxyCODONE-acetaminophen (PERCOCET)  mg per tablet   5. Insomnia secondary to chronic pain  traZODone (DESYREL) 50 MG tablet   6. Stage 3 chronic kidney disease, unspecified whether stage 3a or 3b CKD     7. CHARISMA on CPAP     8. Morbid obesity with body mass index of 45.0-49.9 in adult     9. Coronary artery disease involving native coronary artery of native heart without angina pectoris s/p RCA stent           PLAN:     Cancer related pain  - ENT does not want to provide script for higher dose opioids at this time.  He has surgery scheduled on Wednesday.  Will provide course of Percocet  QID PRN until pain resolves after surgery, or until oncology has a chance to meet with the patient and take over script.   -patient instructed not to accept opioids prescribed by ENT after the surgery, he should stick with the medications I have prescribed for him as the dose should be sufficient for his post-op pain.  -risks of opioids in CHARISMA discussed at length  -pain is especially bad at night and makes it difficult to sleep longer than 2 hours.    Insomnia 2/2 pain  -trial Trazodone QHS  -risks of sleep medications + opioids + CHARISMA discussed    - RTC 1 month  - Counseled patient regarding the importance of  weight loss and activity modification and physical therapy.    The above plan and management options were discussed at length with patient. Patient is in agreement with the above and verbalized understanding. It will be communicated with the referring physician via electronic record, fax, or mail.  Lab/study reports reviewed were important and necessary because subsequent medical and treatment recommendations required review of the above lab/study reports. Images viewed/reviewed above were important and necessary because subsequent medical and treatment recommendations required review of the reviewed image(s).     Electronically signed by:  Pito Claros DO  04/27/2022    =========================================================================================================    SUBJECTIVE:  Has hx of CKD, CAD, PAF on Eliquis.      Janusz Montgomery Jr. is a 77 y.o. male presents to the clinic for the evaluation of right shoudler pain. The pain started a month ago following skin cancer removed  and symptoms have been worsening.  The patient states that his pain is located around the throat.  He was recently diagnosed with non-small cell cancer suggestive of squamous cell carcinoma.    He is planning on neck dissection on Wednesday. Depending on what the result is after surgery, then he will be having chemo and or radiation.  He has not met with oncology or radiation oncology yet.    Pain Description:    The pain is located in the shoulder  area and radiates to the right side of neck.    At BEST  1/10   At WORST  8/10 on the WORST day.    On average pain is rated as 6/10.   Today the pain is rated as 7/10  The pain is continuous.  The pain is described as burning    Symptoms interfere with daily activity and sleeping.   Exacerbating factors: Touching, Coughing/Sneezing, Eating, Night Time, Morning, Extension, Flexing, Lifting and Getting out of bed/chair.    Mitigating factors medications.   He reports 2 hours  of sleep per night.    Physical Therapy/Home Exercise: No, not currently in physical therapy or home exercise program    Current Pain Medications:    - percocet 5mg-325mg helps but only lasts about 2 hours.    Failed Pain Medications:    - percocet 5mg, tylenol    Pain Treatment Therapies:    none    Patient denies urinary incontinence, bowel incontinence, significant motor weakness and loss of sensations.  Patient denies any suicidal or homicidal ideations     report:  Reviewed and consistent with medication use as prescribed.    Imaging:   CT neck 03/0222:    FINDINGS:  There are adjacent necrotic appearing level 2 lymph nodes on the right.  The larger of these measures 3.8 x 2.5 cm and is deep to the sternocleidomastoid muscle, posterior to the internal jugular vein.  There is a smaller, similar appearing adjacent node also noted.  Remaining lymph node stations on the right show no concerning masses.  No significant left cervical adenopathy.     Bilateral parotid glands and submandibular glands are symmetric and without mass.  No drainable fluid collections.     Osseous structures are intact.  No evidence of mastoid effusion.  No acute fracture or destructive lesion.  There is a bone island of the right scapula.  There are degenerative changes of the cervical spine.     Limited evaluation of the intracranial contents show no acute process.  Visualized lungs are clear.     Impression:     Necrotic appearing right level 2 cervical lymph nodes.  There are 2 adjacent lymph nodes, larger of which measures 3.8 x 2.5 cm axial diameter.    Past Medical History:   Diagnosis Date    (HFpEF) heart failure with preserved ejection fraction 2/4/2022    NAT (acute kidney injury) 3/19/2017    ALLERGIC RHINITIS     Anemia     Anxiety     Basal cell carcinoma 10/19/2018    forehead and right medial shoulder    Basal cell carcinoma 01/09/2019    left nasal bridge and left posterior ear    Basal cell carcinoma 06/12/2020  "   left lower medial leg - Efudex    Basal cell carcinoma 11/30/2020    left medial upper eyelid canthus superficial BCC    Chronic rhinitis 5/3/2013    Chronic rhinitis 5/3/2013    Coronary artery disease involving native coronary artery of native heart without angina pectoris s/p RCA stent     Cortical cataract of both eyes 3/18/2016    Delayed sleep phase syndrome 3/13/2019    Depression     Erectile dysfunction 3/24/2014    Erectile dysfunction 3/24/2014    Essential hypertension     GERD (gastroesophageal reflux disease) 7/25/2012    Gout, arthritis     Grade III open fracture of left tibia and fibula s/p ex-fix on 7/1/16 and ORIF of left tibia on 7/15 7/6/2016    H/O: iritis     Helicobacter pylori (H. pylori) infection     Treated    Hepatitis     Herpes simplex keratoconjunctivitis 9/30/2015    - on acyclovir - followed by opthalmology, Dr. Uribe     Herpes simplex keratoconjunctivitis 9/30/2015    - on acyclovir - followed by opthalmology, Dr. Uribe     Hyperkalemia 2/28/2017    Hyperlipidemia     Hypogonadism male     Hypogonadism male     Keloid cicatrix     Mixed anxiety and depressive disorder     Morbid obesity     Obstructive sleep apnea on CPAP     CPAP    Osteoarthritis of left knee 7/25/2012    Paroxysmal atrial fibrillation 7/6/2016    Primary osteoarthritis of left knee 7/25/2012    Prominent aorta 1/25/2016    "RESULTS: THE HEART IS MILDLY ENLARGED WITH A SLIGHTLY PROMINENT AORTA" - Xray Chest PA & Lateral 12-     Prostate cancer 2/15/2016    - followed by urology, Dr. Young     Prostate cancer 2/15/2016    - followed by urology, Dr. 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    chest and forehead    Squamous cell cancer " "of skin of nose     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 7/6/2016    Type III open fracture of left tibia and fibula with routine healing 7/6/2016    Vitamin D deficiency disease     Vitreous detachment 3/18/2016     Past Surgical History:   Procedure Laterality Date    ADJACENT TISSUE TRANSFER Left 11/30/2020    Procedure: ADJACENT TISSUE TRANSFER x2 ;  Surgeon: Teresa Cooper MD;  [facility];  Service: Ophthalmology;  Laterality: Left;  Left glabellar 8.5x11x3 and Left upper eyelid advancement flap [id number]      Cardiac stenting x2      CATARACT EXTRACTION W/  INTRAOCULAR LENS IMPLANT Right 3/29/2016    Dr. Conteh    CATARACT EXTRACTION W/  INTRAOCULAR LENS IMPLANT Left 4/12/2016        COLONOSCOPY N/A 7/23/2020    Procedure: COLONOSCOPY;  Surgeon: Nico Lackey MD;  Location: [facility] (2ND FLR);  Service: Endoscopy;  Laterality: N/A;    ESOPHAGOGASTRODUODENOSCOPY N/A 7/23/2020    Procedure: EGD (ESOPHAGOGASTRODUODENOSCOPY);  Surgeon: Nico Lackey MD;  [facility]  Service: Endoscopy;  Laterality: N/A;  per Dr Lackey-Will proceed with EGD and colonoscopy on the 2nd floor due to his comorbidities including obesity, sleep apnea, restrictive lung disease, coronary artery disease.  has loop recorder      ok to hold Eliquis 2 days per Dr Sandhu-must remain    EXTERNAL FIXATION TIBIAL FRACTURE Left 07/01/2016    INSERTION OF IMPLANTABLE LOOP RECORDER  04/07/2017    LEFT HEART CATHETERIZATION Left 1/30/2020    Procedure: Left heart cath;  Surgeon: Benjamin Sandhu MD;  Location: [facility] CATH LAB;  Service: Cardiology;  Laterality: Left;  RN PREOP 1/28/20--Pt starting Plavix loading dose today (8pills)- Dr. Sandhu aware.  Pt has a bandaged "non healing area to LLE"--Dr. Sandhu aware.    LEFT HEART CATHETERIZATION Left 2/14/2020    Procedure: Left heart cath, IVUS guided left main / LAD PCI. Noon start, radial " approach;  Surgeon: Miguel Angel Brady MD;  Location: [facility] CATH LAB;  Service: Cardiology;  Laterality: Left;  RN PRE OP 2-7-2020  BMI--45.61    ORIF TIBIA FRACTURE Left 07/15/2016    PROBING OF NASOLACRIMAL DUCT WITH INSERTION OF TUBE Left 11/30/2020    Procedure: PROBING, NASOLACRIMAL DUCT,;  Surgeon: Teresa Cooper MD;  Location: [facility] OR [address] [place] [address];  Service: Ophthalmology;  Laterality: Left;    RADIOACTIVE SEED IMPLANTATION OF PROSTATE N/A 8/8/2018    Procedure: INSERTION, RADIOACTIVE SEED, PROSTATE;  Surgeon: Bipin Thompson MD;  [facility];  Service: Urology;  Laterality: N/A;  1 hour    SKIN BIOPSY      Squamous cell cancer removal x3 with Mohs surgery      TONSILLECTOMY      TOTAL KNEE ARTHROPLASTY  10/2012    TRIGGER FINGER RELEASE Right 10/14/2020    Procedure: RELEASE, TRIGGER FINGER - right;  Surgeon: Rad Swift MD;  Location: [facility] OR;  Service: Orthopedics;  Laterality: Right;    trus/bx      ULTRASOUND GUIDANCE  2/14/2020    Procedure: Ultrasound Guidance;  Surgeon: Miguel Angel Brady MD;  Location: [facility] CATH LAB;  Service: Cardiology;;     Social History     Socioeconomic History    Marital status:    Occupational History    Occupation: Retired    Tobacco Use    Smoking status: Never Smoker    Smokeless tobacco: Never Used   Substance and Sexual Activity    Alcohol use: Yes     Alcohol/week: 2.0 standard drinks     Types: 2 Cans of beer per week     Comment: occasionally, beer    Drug use: Never    Sexual activity: Yes     Partners: Female     Birth control/protection: None     Social Determinants of Health     Financial Resource Strain: Low Risk     Difficulty of Paying Living Expenses: Not hard at all   Food Insecurity: No Food Insecurity    Worried About Running Out of Food in the Last Year: Never true    Ran Out of Food in the Last Year: Never true   Transportation Needs: No Transportation Needs    Lack of Transportation (Medical): No    Lack of  Transportation (Non-Medical): No   Physical Activity: Inactive    Days of Exercise per Week: 0 days    Minutes of Exercise per Session: 0 min   Stress: No Stress Concern Present    Feeling of Stress : Not at all   Social Connections: Unknown    Frequency of Communication with Friends and Family: Once a week    Frequency of Social Gatherings with Friends and Family: Once a week    Active Member of Clubs or Organizations: Yes    Attends Club or Organization Meetings: More than 4 times per year    Marital Status:    Housing Stability: Low Risk     Unable to Pay for Housing in the Last Year: No    Number of Places Lived in the Last Year: 1    Unstable Housing in the Last Year: No     Family History   Problem Relation Age of Onset    Skin cancer Father     Lung cancer Father     Cancer Father         smoker,     Alzheimer's disease Mother     Hypertension Mother     Cancer Sister         colon, lung cancer     No Known Problems Sister     Cancer Brother         skin cancer, polypectomy     Peripheral vascular disease Unknown     No Known Problems Maternal Aunt     No Known Problems Maternal Uncle     No Known Problems Paternal Aunt     No Known Problems Paternal Uncle     No Known Problems Maternal Grandmother     No Known Problems Maternal Grandfather     No Known Problems Paternal Grandmother     No Known Problems Paternal Grandfather     Melanoma Neg Hx     Psoriasis Neg Hx     Lupus Neg Hx     Eczema Neg Hx     Amblyopia Neg Hx     Blindness Neg Hx     Cataracts Neg Hx     Diabetes Neg Hx     Glaucoma Neg Hx     Macular degeneration Neg Hx     Retinal detachment Neg Hx     Strabismus Neg Hx     Stroke Neg Hx     Thyroid disease Neg Hx     Acne Neg Hx        Review of patient's allergies indicates:   Allergen Reactions    Ciprofloxacin Rash     Diffuse pruritic morbilliform rash developed 3/15/2017 after dose of cipro; previously in 2/2017 he had rash/fevers after  initiation of cipro    Zosyn [piperacillin-tazobactam] Rash     Diffuse pruritic morbilliform rash developed 3/15/2017.  Then, 430am dose on 3/16 and rash worsened with SOB/tachypnea but no hypoxemia.     Bacitracin Itching and Rash     Violaceous rash in area of topical Tx.        Current Outpatient Medications   Medication Sig    acyclovir (ZOVIRAX) 800 MG Tab Take 1 tablet (800 mg total) by mouth 2 (two) times daily.    aminophylline 250 mg/10 mL injection     apixaban (ELIQUIS) 5 mg Tab Take 1 tablet (5 mg total) by mouth 2 (two) times daily.    artificial tears (ISOPTO TEARS) 0.5 % ophthalmic solution Place 1 drop into both eyes as needed.    atorvastatin (LIPITOR) 40 MG tablet Take 1 tablet (40 mg total) by mouth once daily.    clopidogreL (PLAVIX) 75 mg tablet Take 1 tablet by mouth once daily    CYANOCOBALAMIN, VITAMIN B-12, (VITAMIN B-12 ORAL) Take 2,500 mcg by mouth once daily.    dipyridamole (PERSANTINE) 5 mg/mL injection     doxycycline (VIBRAMYCIN) 100 MG Cap Take 1 po bid with food and not within 1 hour prior to lying down    ELIQUIS 5 mg Tab Take 1 tablet by mouth twice daily    fluocinonide 0.05% (LIDEX) 0.05 % cream Apply topically 2 (two) times daily. Prn itch.  Stop using steroid topical when skin is smooth and non itchy.    fluticasone propionate (FLONASE) 50 mcg/actuation nasal spray 1 spray (50 mcg total) by Each Nostril route once daily.    furosemide (LASIX) 20 MG tablet Take 1 tablet (20 mg total) by mouth once daily.    gabapentin (NEURONTIN) 300 MG capsule Take 1 capsule (300 mg total) by mouth 3 (three) times daily.    gentamicin (GARAMYCIN) 0.1 % ointment     melatonin 5 mg Tab Take 10 mg by mouth nightly.    metoprolol succinate (TOPROL-XL) 25 MG 24 hr tablet Take 1 tablet by mouth once daily    multivitamin (THERAGRAN) per tablet Take 1 tablet by mouth once daily.    mupirocin (BACTROBAN) 2 % ointment AAA lower leg bid after gentle cleansing    nitroGLYCERIN  (NITROSTAT) 0.4 MG SL tablet Place 1 tablet (0.4 mg total) under the tongue every 5 (five) minutes as needed for Chest pain.    olmesartan (BENICAR) 5 MG Tab Take 1 tablet (5 mg total) by mouth once daily.    omeprazole (PRILOSEC) 20 MG capsule Take 1 capsule (20 mg total) by mouth daily as needed.    oxybutynin (DITROPAN-XL) 5 MG TR24 Take 1 tablet (5 mg total) by mouth once daily.    sildenafiL (VIAGRA) 100 MG tablet Take 1 tablet (100 mg total) by mouth daily as needed.    tamsulosin (FLOMAX) 0.4 mg Cap Take 1 capsule (0.4 mg total) by mouth once daily.    triamcinolone acetonide 0.1% (KENALOG) 0.1 % ointment AAA lower legs bid - occlude qhs    venlafaxine (EFFEXOR) 75 MG tablet Take 2 tablets (150 mg total) by mouth 2 (two) times daily.    vitamin D 1000 units Tab Take 1 tablet (1,000 Units total) by mouth once daily. (Patient taking differently: Take 2,000 Units by mouth once daily.)    oxyCODONE-acetaminophen (PERCOCET)  mg per tablet Take 1 tablet by mouth every 6 (six) hours as needed for Pain.    traZODone (DESYREL) 50 MG tablet Take 1 tablet (50 mg total) by mouth nightly as needed for Insomnia.     Current Facility-Administered Medications   Medication    sodium chloride 0.9% flush 10 mL       REVIEW OF SYSTEMS:    GENERAL:  No weight loss, malaise or fevers.  HEENT:   No recent changes in vision or hearing   NECK:  Negative for lumps, no difficulty with swallowing. +difficulty swallowing   RESPIRATORY:  Negative for cough, wheezing or shortness of breath, patient denies any recent URI. +shortness of breath  CARDIOVASCULAR:  Negative for chest pain, leg swelling or palpitations.   GI:  Negative for abdominal discomfort, blood in stools or black stools or change in bowel habits.  MUSCULOSKELETAL:  See HPI.  SKIN:  Negative for lesions, rash, and itching.  PSYCH:  No mood disorder or recent psychosocial stressors.  Patients sleep is not disturbed secondary to pain.  HEMATOLOGY/LYMPHOLOGY:   "Negative for prolonged bleeding, bruising easily or swollen nodes.  Patient is not currently taking any anti-coagulants + bruising easily   NEURO:   No history of headaches, syncope, paralysis, seizures or tremors. + headaches   All other reviewed and negative other than HPI.    OBJECTIVE:    BP (!) 87/47   Pulse 76   Resp 18   Ht 6' 1" (1.854 m)   Wt (!) 157.4 kg (347 lb)   BMI 45.78 kg/m²     PHYSICAL EXAMINATION:    GENERAL: Well appearing, in no acute distress, alert and oriented x3.  PSYCH:  Mood and affect appropriate.  SKIN: Skin color, texture, turgor normal, no rashes or lesions.  HEAD/FACE:  Normocephalic, atraumatic. Cranial nerves grossly intact.    NECK:   - Positive pain to palpation over lateral and anterior neck  - Spurling  Negative.  - right sided scar is well healed    CV: RRR with palpation of the radial artery.  PULM: CTAB. No evidence of respiratory difficulty, symmetric chest rise.  GI:  Soft and non-tender.    NEUROLOGICAL EXAM:  MENTAL STATUS: A x O x 3, good concentration, speech is fluent and goal directed  MEMORY: recent and remote are intact  CN: CN2-12 grossly intact  MOTOR: 5/5 in all muscle groups  DTRs: 2+ intact symmetric  "

## 2022-04-26 NOTE — TELEPHONE ENCOUNTER
I can see him, but let him know that when patient's have active cancer; oncology always writes for the pain medications.  We need to clarify who his oncologist is, and find out when his next appointment is with them first before scheduling the appointment with me.  Thank you.

## 2022-04-27 NOTE — H&P (VIEW-ONLY)
OTOLARYNGOLOGY CLINIC NOTE-VIRTUAL VISIT  Date:  04/27/2022     Chief complaint:  Follow up neck cancer    History of Present Illness  Janusz Montgomery Jr. is a 77 y.o. male  presenting today for a follow up discussion of surgical planning. Case presented at tumor board last week and called patient to discuss recommendations. I have been working on getting him cardiac clearance and a surgical date that is outside of my block time as my block time is full and urgent need for case. He has been able to see pain medicine physician.     Because of the COVID-19 pandemic, we offered the patient a virtual visit. The patient elected to have a virtual visit.The patient's virtual visit today was conducted via telemedicine (video visit) as indicated to reduce risk of transmission of Covid-19 given the current pandemic and safety measures related to this.      The location of the patient for the virtual visit is : home     Past Medical History  Past Medical History:   Diagnosis Date    (HFpEF) heart failure with preserved ejection fraction 2/4/2022    NAT (acute kidney injury) 3/19/2017    ALLERGIC RHINITIS     Anemia     Anxiety     Basal cell carcinoma 10/19/2018    forehead and right medial shoulder    Basal cell carcinoma 01/09/2019    left nasal bridge and left posterior ear    Basal cell carcinoma 06/12/2020    left lower medial leg - Efudex    Basal cell carcinoma 11/30/2020    left medial upper eyelid canthus superficial BCC    Chronic rhinitis 5/3/2013    Chronic rhinitis 5/3/2013    Coronary artery disease involving native coronary artery of native heart without angina pectoris s/p RCA stent     Cortical cataract of both eyes 3/18/2016    Delayed sleep phase syndrome 3/13/2019    Depression     Erectile dysfunction 3/24/2014    Erectile dysfunction 3/24/2014    Essential hypertension     GERD (gastroesophageal reflux disease) 7/25/2012    Gout, arthritis     Grade III open fracture of left tibia and  "fibula s/p ex-fix on 7/1/16 and ORIF of left tibia on 7/15 7/6/2016    H/O: iritis     Helicobacter pylori (H. pylori) infection     Treated    Hepatitis     Herpes simplex keratoconjunctivitis 9/30/2015    - on acyclovir - followed by opthalmology, Dr. Uribe     Herpes simplex keratoconjunctivitis 9/30/2015    - on acyclovir - followed by opthalmology, Dr. Uribe     Hyperkalemia 2/28/2017    Hyperlipidemia     Hypogonadism male     Hypogonadism male     Keloid cicatrix     Mixed anxiety and depressive disorder     Morbid obesity     Obstructive sleep apnea on CPAP     CPAP    Osteoarthritis of left knee 7/25/2012    Paroxysmal atrial fibrillation 7/6/2016    Primary osteoarthritis of left knee 7/25/2012    Prominent aorta 1/25/2016    "RESULTS: THE HEART IS MILDLY ENLARGED WITH A SLIGHTLY PROMINENT AORTA" - Xray Chest PA & Lateral 12-     Prostate cancer 2/15/2016    - followed by urology, Dr. Young     Prostate cancer 2/15/2016    - followed by urology, Dr. 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    chest and forehead    Squamous cell cancer of skin of nose     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 7/6/2016    Type III open fracture of left tibia and fibula with routine healing 7/6/2016    Vitamin D deficiency disease     Vitreous detachment 3/18/2016        Past Surgical History  Past Surgical History:   Procedure Laterality Date    ADJACENT TISSUE TRANSFER Left 11/30/2020    Procedure: ADJACENT TISSUE TRANSFER x2 ;  Surgeon: Teresa Cooper MD;  Location: [facility] OR [address] [place] [address];  Service: Ophthalmology;  Laterality: Left;  Left glabellar 8.5x11x3 and Left upper eyelid advancement flap [id number]      Cardiac " "stenting x2      CATARACT EXTRACTION W/  INTRAOCULAR LENS IMPLANT Right 3/29/2016    Dr. Conteh    CATARACT EXTRACTION W/  INTRAOCULAR LENS IMPLANT Left 4/12/2016        COLONOSCOPY N/A 7/23/2020    Procedure: COLONOSCOPY;  Surgeon: Nico Lackey MD;  [facility]  Service: Endoscopy;  Laterality: N/A;    ESOPHAGOGASTRODUODENOSCOPY N/A 7/23/2020    Procedure: EGD (ESOPHAGOGASTRODUODENOSCOPY);  Surgeon: Nico Lackey MD;  [facility] ([facility]);  Service: Endoscopy;  Laterality: N/A;  per Dr Lackey-Will proceed with EGD and colonoscopy on the 2nd floor due to his comorbidities including obesity, sleep apnea, restrictive lung disease, coronary artery disease.  has loop recorder      ok to hold Eliquis 2 days per Dr Sandhu-must remain    EXTERNAL FIXATION TIBIAL FRACTURE Left 07/01/2016    INSERTION OF IMPLANTABLE LOOP RECORDER  04/07/2017    LEFT HEART CATHETERIZATION Left 1/30/2020    Procedure: Left heart cath;  Surgeon: Benjamin Sandhu MD;  [facility];  Service: Cardiology;  Laterality: Left;  RN PREOP 1/28/20--Pt starting Plavix loading dose today (8pills)- Dr. Sandhu aware.  Pt has a bandaged "non healing area to LLE"--Dr. Sandhu aware.    LEFT HEART CATHETERIZATION Left 2/14/2020    Procedure: Left heart cath, IVUS guided left main / LAD PCI. Noon start, radial approach;  Surgeon: Miguel Angel Brady MD;  Location: [facility] C[facility];  Service: Cardiology;  Laterality: Left;  RN PRE OP 2-7-2020  BMI--45.61    ORIF TIBIA FRACTURE Left 07/15/2016    PROBING OF NASOLACRIMAL DUCT WITH INSERTION OF TUBE Left 11/30/2020    Procedure: PROBING, NASOLACRIMAL DUCT,;  Surgeon: Teresa Cooper MD;  [facility];  Service: Ophthalmology;  Laterality: Left;    RADIOACTIVE SEED IMPLANTATION OF PROSTATE N/A 8/8/2018    Procedure: INSERTION, RADIOACTIVE SEED, PROSTATE;  Surgeon: Bipin Thompson MD;  Location: [facility] OR [address] [place] [address];  Service: Urology;  " Laterality: N/A;  1 hour    SKIN BIOPSY      Squamous cell cancer removal x3 with Mohs surgery      TONSILLECTOMY      TOTAL KNEE ARTHROPLASTY  10/2012    TRIGGER FINGER RELEASE Right 10/14/2020    Procedure: RELEASE, TRIGGER FINGER - right;  Surgeon: Rad Swift MD;  Location: [facility] OR;  Service: Orthopedics;  Laterality: Right;    trus/bx      ULTRASOUND GUIDANCE  2/14/2020    Procedure: Ultrasound Guidance;  Surgeon: Miguel Angel Brady MD;  Location: [facility] CATH LAB;  Service: Cardiology;;        Medications  Current Outpatient Medications on File Prior to Visit   Medication Sig Dispense Refill    acyclovir (ZOVIRAX) 800 MG Tab Take 1 tablet (800 mg total) by mouth 2 (two) times daily. 180 tablet 6    aminophylline 250 mg/10 mL injection       apixaban (ELIQUIS) 5 mg Tab Take 1 tablet (5 mg total) by mouth 2 (two) times daily. 180 tablet 4    artificial tears (ISOPTO TEARS) 0.5 % ophthalmic solution Place 1 drop into both eyes as needed.      atorvastatin (LIPITOR) 40 MG tablet Take 1 tablet (40 mg total) by mouth once daily. 90 tablet 1    clopidogreL (PLAVIX) 75 mg tablet Take 1 tablet by mouth once daily 90 tablet 3    CYANOCOBALAMIN, VITAMIN B-12, (VITAMIN B-12 ORAL) Take 2,500 mcg by mouth once daily.      dipyridamole (PERSANTINE) 5 mg/mL injection       doxycycline (VIBRAMYCIN) 100 MG Cap Take 1 po bid with food and not within 1 hour prior to lying down 28 capsule 0    ELIQUIS 5 mg Tab Take 1 tablet by mouth twice daily 180 tablet 3    fluocinonide 0.05% (LIDEX) 0.05 % cream Apply topically 2 (two) times daily. Prn itch.  Stop using steroid topical when skin is smooth and non itchy. 45 g 0    fluticasone propionate (FLONASE) 50 mcg/actuation nasal spray 1 spray (50 mcg total) by Each Nostril route once daily. 1 Bottle 0    furosemide (LASIX) 20 MG tablet Take 1 tablet (20 mg total) by mouth once daily. 90 tablet 3    gabapentin (NEURONTIN) 300 MG capsule Take 1 capsule (300 mg total) by  mouth 3 (three) times daily. 270 capsule 1    gentamicin (GARAMYCIN) 0.1 % ointment       melatonin 5 mg Tab Take 10 mg by mouth nightly.      metoprolol succinate (TOPROL-XL) 25 MG 24 hr tablet Take 1 tablet by mouth once daily 90 tablet 3    multivitamin (THERAGRAN) per tablet Take 1 tablet by mouth once daily.      mupirocin (BACTROBAN) 2 % ointment AAA lower leg bid after gentle cleansing 30 g 1    nitroGLYCERIN (NITROSTAT) 0.4 MG SL tablet Place 1 tablet (0.4 mg total) under the tongue every 5 (five) minutes as needed for Chest pain. 25 tablet 11    olmesartan (BENICAR) 5 MG Tab Take 1 tablet (5 mg total) by mouth once daily. 90 tablet 3    omeprazole (PRILOSEC) 20 MG capsule Take 1 capsule (20 mg total) by mouth daily as needed. 90 capsule 0    oxybutynin (DITROPAN-XL) 5 MG TR24 Take 1 tablet (5 mg total) by mouth once daily. 30 tablet 11    oxyCODONE-acetaminophen (PERCOCET)  mg per tablet Take 1 tablet by mouth every 6 (six) hours as needed for Pain. 120 tablet 0    sildenafiL (VIAGRA) 100 MG tablet Take 1 tablet (100 mg total) by mouth daily as needed. 10 tablet 11    tamsulosin (FLOMAX) 0.4 mg Cap Take 1 capsule (0.4 mg total) by mouth once daily. 90 capsule 3    traZODone (DESYREL) 50 MG tablet Take 1 tablet (50 mg total) by mouth nightly as needed for Insomnia. 30 tablet 2    triamcinolone acetonide 0.1% (KENALOG) 0.1 % ointment AAA lower legs bid - occlude qhs 454 g 3    venlafaxine (EFFEXOR) 75 MG tablet Take 2 tablets (150 mg total) by mouth 2 (two) times daily. 360 tablet 0    vitamin D 1000 units Tab Take 1 tablet (1,000 Units total) by mouth once daily. (Patient taking differently: Take 2,000 Units by mouth once daily.)      [DISCONTINUED] oxyCODONE-acetaminophen (PERCOCET) 5-325 mg per tablet Take 1 tablet by mouth every 4 (four) hours as needed for Pain. 10 tablet 0     No current facility-administered medications on file prior to visit.       Review of Systems  Review of  Systems   Constitutional: Negative.    HENT: Negative.    Eyes: Negative.    Respiratory: Positive for shortness of breath and wheezing.    Cardiovascular: Negative.    Gastrointestinal: Negative.    Genitourinary: Negative.    Skin: Negative.    Neurological: Positive for dizziness and headaches.   Psychiatric/Behavioral: Positive for depression. The patient is nervous/anxious.     Answers for HPI/ROS submitted by the patient on 4/27/2022  Sleep Apnea?: Yes  Muscle aches / pain?: Yes  Light-headedness: Yes  decreased concentration: Yes  sleep disturbance: Yes    Social History   reports that he has never smoked. He has never used smokeless tobacco. He reports current alcohol use of about 2.0 standard drinks of alcohol per week. He reports that he does not use drugs.     Family History  Family History   Problem Relation Age of Onset    Skin cancer Father     Lung cancer Father     Cancer Father         smoker,     Alzheimer's disease Mother     Hypertension Mother     Cancer Sister         colon, lung cancer     No Known Problems Sister     Cancer Brother         skin cancer, polypectomy     Peripheral vascular disease Unknown     No Known Problems Maternal Aunt     No Known Problems Maternal Uncle     No Known Problems Paternal Aunt     No Known Problems Paternal Uncle     No Known Problems Maternal Grandmother     No Known Problems Maternal Grandfather     No Known Problems Paternal Grandmother     No Known Problems Paternal Grandfather     Melanoma Neg Hx     Psoriasis Neg Hx     Lupus Neg Hx     Eczema Neg Hx     Amblyopia Neg Hx     Blindness Neg Hx     Cataracts Neg Hx     Diabetes Neg Hx     Glaucoma Neg Hx     Macular degeneration Neg Hx     Retinal detachment Neg Hx     Strabismus Neg Hx     Stroke Neg Hx     Thyroid disease Neg Hx     Acne Neg Hx         Physical Exam -limited due to nature of virtual visit, unable to obtain vital signs    GENERAL: no acute distress.  HEAD:  normocephalic.   EYES: No scleral icterus  EARS: external ear without obvious lesion, normal pinna shape and position.   NOSE: external nose without significant bony abnormality  ORAL CAVITY/OROPHARYNX: tongue  mobile.   NECK: trachea midline.   RESPIRATORY: no stridor, no stertor. Voice normal. Respirations nonlabored.  NEURO: alert, responds to questions appropriately.   Facial muscles symmetric at rest  PSYCH:mood appropriate      Imaging:  The patient does not have any pertinent and/or recent imaging of the head and neck.     Labs:  CBC  Recent Labs   Lab 06/11/21  1311 12/01/21  1324 03/29/22  1400   WBC 11.14 12.22 11.38   Hemoglobin 11.7 L 13.0 L 11.4 L   Hematocrit 36.8 L 39.8 L 34.1 L    H 104 H 104 H   Platelets 193 183 174     BMP  Recent Labs   Lab 06/22/20  1306 08/17/20  1448 08/25/20  0349 08/26/20  0335 08/26/20  1226 08/27/20  0607 08/31/20  1418 03/11/21  1439 06/11/21  1311 12/01/21  1324 01/10/22  1332 01/28/22  1455 04/06/22  1442 04/20/22  1337   Glucose 95   < > 86 103   < > 96   < > 118 H 101 97   < > 98 98 98   Sodium 139   < > 142 143   < > 142   < > 139 137 141   < > 141 142 140   Potassium 5.2 H   < > 5.1 5.3 H   < > 4.9   < > 4.4 4.2 3.9   < > 4.3 4.3 4.7   Chloride 107   < > 111 H 113 H   < > 109   < > 106 104 102   < > 106 104 105   CO2 26   < > 22 L 20 L   < > 26   < > 21 L 23 25   < > 25 25 24   BUN 32 H   < > 55 H 46 H   < > 40 H   < > 30 H 24 H 23   < > 26 H 24 H 24 H   Creatinine 1.6 H   < > 2.9 H 2.6 H   < > 2.3 H   < > 1.4 1.3 1.5 H   < > 1.5 H 1.6 H 1.5 H   Calcium 9.1   < > 8.1 L 8.7   < > 8.8   < > 9.1 9.1 9.4   < > 8.9 9.1 9.2   Phosphorus  --    < > 5.1 H 4.5  --  4.3   < > 3.3 2.8 2.6 L  --   --   --   --    Magnesium  --    < > 2.1 2.0  --  1.8  --   --   --   --   --   --   --   --    Prealbumin 18 L  --   --   --   --   --   --   --   --   --   --   --   --   --     < > = values in this interval not displayed.     KAREN  Recent Labs   Lab 01/28/20  1508   INR 1.0        Assessment  1. Cervical lymphadenopathy    2. Personal history of skin cancer    3. Chronic anticoagulation       Plan:  Discussed plan of care with patient in detail and all questions answered. Patient reported understanding of plan of care.   He will needs level 2-5 neck dissection. Suspect source from skin cancer ( scca in supraclavicular fossa region in 2021- see chart note for lesion photo from derm note 6-8-21)  Complications included will have permanent numbness of ear lobe  Temporary or permanent nerve injury that can result in the following  Tongue weakness   Shoulder weakness where unable to lift arm well and can cause chronic pain and muscle aches  Lower lip weakness that can cause crooked smile/drooling  Need for further treatment including radiation or additional surgery in the future    Possibility that open neck and and cancer unable to be safely removed and procedure must be aborted  Complications of anesthesia/postoperative complications such as stroke, heart attack. Wound infection blood clot in wound requiring wound to be washed out, respiratory failure requiring breathing tube to be in place for period of time after surgery , flare up of congestive heart failure      Tentatively planned for 5-4-22 in my nonblock time- awaiting approval from surgery department    Has cardio apptmt for clearance tomorrow- recs for preop cessation of anticoag and how long can hold postop requested    Surgery is for direct laryngoscopy , biopsy and neck dissection, length of procedure approximately 4 hours. He will be admitted overnight and may require 2 night hospital stay    I spent a total of 30 minutes on the day of the visit.  This includes face to face time and non-face to face time preparing to see the patient (eg, review of tests), obtaining and/or reviewing separately obtained history, documenting clinical information in the electronic or other health record, independently interpreting results and  communicating results to the patient/family/caregiver, or care coordinator.    Please be aware that this note has been generated with the assistance of MModal voice-to-text.  Please excuse any spelling or grammatical errors.

## 2022-04-27 NOTE — PROGRESS NOTES
OTOLARYNGOLOGY CLINIC NOTE-VIRTUAL VISIT  Date:  04/27/2022     Chief complaint:  Follow up neck cancer    History of Present Illness  Janusz Montgomery Jr. is a 77 y.o. male  presenting today for a follow up discussion of surgical planning. Case presented at tumor board last week and called patient to discuss recommendations. I have been working on getting him cardiac clearance and a surgical date that is outside of my block time as my block time is full and urgent need for case. He has been able to see pain medicine physician.     Because of the COVID-19 pandemic, we offered the patient a virtual visit. The patient elected to have a virtual visit.The patient's virtual visit today was conducted via telemedicine (video visit) as indicated to reduce risk of transmission of Covid-19 given the current pandemic and safety measures related to this.      The location of the patient for the virtual visit is : home     Past Medical History  Past Medical History:   Diagnosis Date    (HFpEF) heart failure with preserved ejection fraction 2/4/2022    NAT (acute kidney injury) 3/19/2017    ALLERGIC RHINITIS     Anemia     Anxiety     Basal cell carcinoma 10/19/2018    forehead and right medial shoulder    Basal cell carcinoma 01/09/2019    left nasal bridge and left posterior ear    Basal cell carcinoma 06/12/2020    left lower medial leg - Efudex    Basal cell carcinoma 11/30/2020    left medial upper eyelid canthus superficial BCC    Chronic rhinitis 5/3/2013    Chronic rhinitis 5/3/2013    Coronary artery disease involving native coronary artery of native heart without angina pectoris s/p RCA stent     Cortical cataract of both eyes 3/18/2016    Delayed sleep phase syndrome 3/13/2019    Depression     Erectile dysfunction 3/24/2014    Erectile dysfunction 3/24/2014    Essential hypertension     GERD (gastroesophageal reflux disease) 7/25/2012    Gout, arthritis     Grade III open fracture of left tibia and  "fibula s/p ex-fix on 7/1/16 and ORIF of left tibia on 7/15 7/6/2016    H/O: iritis     Helicobacter pylori (H. pylori) infection     Treated    Hepatitis     Herpes simplex keratoconjunctivitis 9/30/2015    - on acyclovir - followed by opthalmology, Dr. Uribe     Herpes simplex keratoconjunctivitis 9/30/2015    - on acyclovir - followed by opthalmology, Dr. Uribe     Hyperkalemia 2/28/2017    Hyperlipidemia     Hypogonadism male     Hypogonadism male     Keloid cicatrix     Mixed anxiety and depressive disorder     Morbid obesity     Obstructive sleep apnea on CPAP     CPAP    Osteoarthritis of left knee 7/25/2012    Paroxysmal atrial fibrillation 7/6/2016    Primary osteoarthritis of left knee 7/25/2012    Prominent aorta 1/25/2016    "RESULTS: THE HEART IS MILDLY ENLARGED WITH A SLIGHTLY PROMINENT AORTA" - Xray Chest PA & Lateral 12-     Prostate cancer 2/15/2016    - followed by urology, Dr. Young     Prostate cancer 2/15/2016    - followed by urology, Dr. 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    chest and forehead    Squamous cell cancer of skin of nose     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 7/6/2016    Type III open fracture of left tibia and fibula with routine healing 7/6/2016    Vitamin D deficiency disease     Vitreous detachment 3/18/2016        Past Surgical History  Past Surgical History:   Procedure Laterality Date    ADJACENT TISSUE TRANSFER Left 11/30/2020    Procedure: ADJACENT TISSUE TRANSFER x2 ;  Surgeon: Teresa Cooper MD;  Location: [facility] OR [address] [place] [address];  Service: Ophthalmology;  Laterality: Left;  Left glabellar 8.5x11x3 and Left upper eyelid advancement flap [id number]      Cardiac " "stenting x2      CATARACT EXTRACTION W/  INTRAOCULAR LENS IMPLANT Right 3/29/2016    Dr. Conteh    CATARACT EXTRACTION W/  INTRAOCULAR LENS IMPLANT Left 4/12/2016        COLONOSCOPY N/A 7/23/2020    Procedure: COLONOSCOPY;  Surgeon: Nico Lackey MD;  Location: [facility] (2ND FLR);  Service: Endoscopy;  Laterality: N/A;    ESOPHAGOGASTRODUODENOSCOPY N/A 7/23/2020    Procedure: EGD (ESOPHAGOGASTRODUODENOSCOPY);  Surgeon: Nico Lackey MD;  Location: [facility] (2ND FLR);  Service: Endoscopy;  Laterality: N/A;  per Dr Lackey-Will proceed with EGD and colonoscopy on the 2nd floor due to his comorbidities including obesity, sleep apnea, restrictive lung disease, coronary artery disease.  has loop recorder      ok to hold Eliquis 2 days per Dr Sandhu-must remain    EXTERNAL FIXATION TIBIAL FRACTURE Left 07/01/2016    INSERTION OF IMPLANTABLE LOOP RECORDER  04/07/2017    LEFT HEART CATHETERIZATION Left 1/30/2020    Procedure: Left heart cath;  Surgeon: Benjamin Sandhu MD;  Location: [facility] CATH LAB;  Service: Cardiology;  Laterality: Left;  RN PREOP 1/28/20--Pt starting Plavix loading dose today (8pills)- Dr. Sandhu aware.  Pt has a bandaged "non healing area to LLE"--Dr. Sandhu aware.    LEFT HEART CATHETERIZATION Left 2/14/2020    Procedure: Left heart cath, IVUS guided left main / LAD PCI. Noon start, radial approach;  Surgeon: Miguel Angel Brady MD;  Location: [facility] CATH LAB;  Service: Cardiology;  Laterality: Left;  RN PRE OP 2-7-2020  BMI--45.61    ORIF TIBIA FRACTURE Left 07/15/2016    PROBING OF NASOLACRIMAL DUCT WITH INSERTION OF TUBE Left 11/30/2020    Procedure: PROBING, NASOLACRIMAL DUCT,;  Surgeon: Teresa Cooper MD;  [facility];  Service: Ophthalmology;  Laterality: Left;    RADIOACTIVE SEED IMPLANTATION OF PROSTATE N/A 8/8/2018    Procedure: INSERTION, RADIOACTIVE SEED, PROSTATE;  Surgeon: Bipin Thompson MD;  Location: [facility] OR [address] [place] [address];  Service: Urology;  " Laterality: N/A;  1 hour    SKIN BIOPSY      Squamous cell cancer removal x3 with Mohs surgery      TONSILLECTOMY      TOTAL KNEE ARTHROPLASTY  10/2012    TRIGGER FINGER RELEASE Right 10/14/2020    Procedure: RELEASE, TRIGGER FINGER - right;  Surgeon: Rad Swift MD;  Location: [facility] OR;  Service: Orthopedics;  Laterality: Right;    trus/bx      ULTRASOUND GUIDANCE  2/14/2020    Procedure: Ultrasound Guidance;  Surgeon: Miguel Angel Brady MD;  Location: [facility] CATH LAB;  Service: Cardiology;;        Medications  Current Outpatient Medications on File Prior to Visit   Medication Sig Dispense Refill    acyclovir (ZOVIRAX) 800 MG Tab Take 1 tablet (800 mg total) by mouth 2 (two) times daily. 180 tablet 6    aminophylline 250 mg/10 mL injection       apixaban (ELIQUIS) 5 mg Tab Take 1 tablet (5 mg total) by mouth 2 (two) times daily. 180 tablet 4    artificial tears (ISOPTO TEARS) 0.5 % ophthalmic solution Place 1 drop into both eyes as needed.      atorvastatin (LIPITOR) 40 MG tablet Take 1 tablet (40 mg total) by mouth once daily. 90 tablet 1    clopidogreL (PLAVIX) 75 mg tablet Take 1 tablet by mouth once daily 90 tablet 3    CYANOCOBALAMIN, VITAMIN B-12, (VITAMIN B-12 ORAL) Take 2,500 mcg by mouth once daily.      dipyridamole (PERSANTINE) 5 mg/mL injection       doxycycline (VIBRAMYCIN) 100 MG Cap Take 1 po bid with food and not within 1 hour prior to lying down 28 capsule 0    ELIQUIS 5 mg Tab Take 1 tablet by mouth twice daily 180 tablet 3    fluocinonide 0.05% (LIDEX) 0.05 % cream Apply topically 2 (two) times daily. Prn itch.  Stop using steroid topical when skin is smooth and non itchy. 45 g 0    fluticasone propionate (FLONASE) 50 mcg/actuation nasal spray 1 spray (50 mcg total) by Each Nostril route once daily. 1 Bottle 0    furosemide (LASIX) 20 MG tablet Take 1 tablet (20 mg total) by mouth once daily. 90 tablet 3    gabapentin (NEURONTIN) 300 MG capsule Take 1 capsule (300 mg total) by  mouth 3 (three) times daily. 270 capsule 1    gentamicin (GARAMYCIN) 0.1 % ointment       melatonin 5 mg Tab Take 10 mg by mouth nightly.      metoprolol succinate (TOPROL-XL) 25 MG 24 hr tablet Take 1 tablet by mouth once daily 90 tablet 3    multivitamin (THERAGRAN) per tablet Take 1 tablet by mouth once daily.      mupirocin (BACTROBAN) 2 % ointment AAA lower leg bid after gentle cleansing 30 g 1    nitroGLYCERIN (NITROSTAT) 0.4 MG SL tablet Place 1 tablet (0.4 mg total) under the tongue every 5 (five) minutes as needed for Chest pain. 25 tablet 11    olmesartan (BENICAR) 5 MG Tab Take 1 tablet (5 mg total) by mouth once daily. 90 tablet 3    omeprazole (PRILOSEC) 20 MG capsule Take 1 capsule (20 mg total) by mouth daily as needed. 90 capsule 0    oxybutynin (DITROPAN-XL) 5 MG TR24 Take 1 tablet (5 mg total) by mouth once daily. 30 tablet 11    oxyCODONE-acetaminophen (PERCOCET)  mg per tablet Take 1 tablet by mouth every 6 (six) hours as needed for Pain. 120 tablet 0    sildenafiL (VIAGRA) 100 MG tablet Take 1 tablet (100 mg total) by mouth daily as needed. 10 tablet 11    tamsulosin (FLOMAX) 0.4 mg Cap Take 1 capsule (0.4 mg total) by mouth once daily. 90 capsule 3    traZODone (DESYREL) 50 MG tablet Take 1 tablet (50 mg total) by mouth nightly as needed for Insomnia. 30 tablet 2    triamcinolone acetonide 0.1% (KENALOG) 0.1 % ointment AAA lower legs bid - occlude qhs 454 g 3    venlafaxine (EFFEXOR) 75 MG tablet Take 2 tablets (150 mg total) by mouth 2 (two) times daily. 360 tablet 0    vitamin D 1000 units Tab Take 1 tablet (1,000 Units total) by mouth once daily. (Patient taking differently: Take 2,000 Units by mouth once daily.)      [DISCONTINUED] oxyCODONE-acetaminophen (PERCOCET) 5-325 mg per tablet Take 1 tablet by mouth every 4 (four) hours as needed for Pain. 10 tablet 0     No current facility-administered medications on file prior to visit.       Review of Systems  Review of  Systems   Constitutional: Negative.    HENT: Negative.    Eyes: Negative.    Respiratory: Positive for shortness of breath and wheezing.    Cardiovascular: Negative.    Gastrointestinal: Negative.    Genitourinary: Negative.    Skin: Negative.    Neurological: Positive for dizziness and headaches.   Psychiatric/Behavioral: Positive for depression. The patient is nervous/anxious.     Answers for HPI/ROS submitted by the patient on 4/27/2022  Sleep Apnea?: Yes  Muscle aches / pain?: Yes  Light-headedness: Yes  decreased concentration: Yes  sleep disturbance: Yes    Social History   reports that he has never smoked. He has never used smokeless tobacco. He reports current alcohol use of about 2.0 standard drinks of alcohol per week. He reports that he does not use drugs.     Family History  Family History   Problem Relation Age of Onset    Skin cancer Father     Lung cancer Father     Cancer Father         smoker,     Alzheimer's disease Mother     Hypertension Mother     Cancer Sister         colon, lung cancer     No Known Problems Sister     Cancer Brother         skin cancer, polypectomy     Peripheral vascular disease Unknown     No Known Problems Maternal Aunt     No Known Problems Maternal Uncle     No Known Problems Paternal Aunt     No Known Problems Paternal Uncle     No Known Problems Maternal Grandmother     No Known Problems Maternal Grandfather     No Known Problems Paternal Grandmother     No Known Problems Paternal Grandfather     Melanoma Neg Hx     Psoriasis Neg Hx     Lupus Neg Hx     Eczema Neg Hx     Amblyopia Neg Hx     Blindness Neg Hx     Cataracts Neg Hx     Diabetes Neg Hx     Glaucoma Neg Hx     Macular degeneration Neg Hx     Retinal detachment Neg Hx     Strabismus Neg Hx     Stroke Neg Hx     Thyroid disease Neg Hx     Acne Neg Hx         Physical Exam -limited due to nature of virtual visit, unable to obtain vital signs    GENERAL: no acute distress.  HEAD:  normocephalic.   EYES: No scleral icterus  EARS: external ear without obvious lesion, normal pinna shape and position.   NOSE: external nose without significant bony abnormality  ORAL CAVITY/OROPHARYNX: tongue  mobile.   NECK: trachea midline.   RESPIRATORY: no stridor, no stertor. Voice normal. Respirations nonlabored.  NEURO: alert, responds to questions appropriately.   Facial muscles symmetric at rest  PSYCH:mood appropriate      Imaging:  The patient does not have any pertinent and/or recent imaging of the head and neck.     Labs:  CBC  Recent Labs   Lab 06/11/21  1311 12/01/21  1324 03/29/22  1400   WBC 11.14 12.22 11.38   Hemoglobin 11.7 L 13.0 L 11.4 L   Hematocrit 36.8 L 39.8 L 34.1 L    H 104 H 104 H   Platelets 193 183 174     BMP  Recent Labs   Lab 06/22/20  1306 08/17/20  1448 08/25/20  0349 08/26/20  0335 08/26/20  1226 08/27/20  0607 08/31/20  1418 03/11/21  1439 06/11/21  1311 12/01/21  1324 01/10/22  1332 01/28/22  1455 04/06/22  1442 04/20/22  1337   Glucose 95   < > 86 103   < > 96   < > 118 H 101 97   < > 98 98 98   Sodium 139   < > 142 143   < > 142   < > 139 137 141   < > 141 142 140   Potassium 5.2 H   < > 5.1 5.3 H   < > 4.9   < > 4.4 4.2 3.9   < > 4.3 4.3 4.7   Chloride 107   < > 111 H 113 H   < > 109   < > 106 104 102   < > 106 104 105   CO2 26   < > 22 L 20 L   < > 26   < > 21 L 23 25   < > 25 25 24   BUN 32 H   < > 55 H 46 H   < > 40 H   < > 30 H 24 H 23   < > 26 H 24 H 24 H   Creatinine 1.6 H   < > 2.9 H 2.6 H   < > 2.3 H   < > 1.4 1.3 1.5 H   < > 1.5 H 1.6 H 1.5 H   Calcium 9.1   < > 8.1 L 8.7   < > 8.8   < > 9.1 9.1 9.4   < > 8.9 9.1 9.2   Phosphorus  --    < > 5.1 H 4.5  --  4.3   < > 3.3 2.8 2.6 L  --   --   --   --    Magnesium  --    < > 2.1 2.0  --  1.8  --   --   --   --   --   --   --   --    Prealbumin 18 L  --   --   --   --   --   --   --   --   --   --   --   --   --     < > = values in this interval not displayed.     KAREN  Recent Labs   Lab 01/28/20  1508   INR 1.0        Assessment  1. Cervical lymphadenopathy    2. Personal history of skin cancer    3. Chronic anticoagulation       Plan:  Discussed plan of care with patient in detail and all questions answered. Patient reported understanding of plan of care.   He will needs level 2-5 neck dissection. Suspect source from skin cancer ( scca in supraclavicular fossa region in 2021- see chart note for lesion photo from derm note 6-8-21)  Complications included will have permanent numbness of ear lobe  Temporary or permanent nerve injury that can result in the following  Tongue weakness   Shoulder weakness where unable to lift arm well and can cause chronic pain and muscle aches  Lower lip weakness that can cause crooked smile/drooling  Need for further treatment including radiation or additional surgery in the future    Possibility that open neck and and cancer unable to be safely removed and procedure must be aborted  Complications of anesthesia/postoperative complications such as stroke, heart attack. Wound infection blood clot in wound requiring wound to be washed out, respiratory failure requiring breathing tube to be in place for period of time after surgery , flare up of congestive heart failure      Tentatively planned for 5-4-22 in my nonblock time- awaiting approval from surgery department    Has cardio apptmt for clearance tomorrow- recs for preop cessation of anticoag and how long can hold postop requested    Surgery is for direct laryngoscopy , biopsy and neck dissection, length of procedure approximately 4 hours. He will be admitted overnight and may require 2 night hospital stay    I spent a total of 30 minutes on the day of the visit.  This includes face to face time and non-face to face time preparing to see the patient (eg, review of tests), obtaining and/or reviewing separately obtained history, documenting clinical information in the electronic or other health record, independently interpreting results and  communicating results to the patient/family/caregiver, or care coordinator.    Please be aware that this note has been generated with the assistance of MModal voice-to-text.  Please excuse any spelling or grammatical errors.
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
## 2022-04-29 NOTE — ANESTHESIA PREPROCEDURE EVALUATION
04/29/2022  Janusz Montgomery Jr. is a 77 y.o., male scheduled for  DISSECTION, NECK (Right Neck) - TO FOLLOW DR ALMAZAN       LARYNGOSCOPY, DIRECT possible biopsy (N/A Abdomen) on 5/4/2022.        Past Medical History:   Diagnosis Date    (HFpEF) heart failure with preserved ejection fraction 2/4/2022    NAT (acute kidney injury) 3/19/2017    ALLERGIC RHINITIS     Anemia     Anxiety     Basal cell carcinoma 10/19/2018    forehead and right medial shoulder    Basal cell carcinoma 01/09/2019    left nasal bridge and left posterior ear    Basal cell carcinoma 06/12/2020    left lower medial leg - Efudex    Basal cell carcinoma 11/30/2020    left medial upper eyelid canthus superficial BCC    Chronic rhinitis 5/3/2013    Chronic rhinitis 5/3/2013    Coronary artery disease involving native coronary artery of native heart without angina pectoris s/p RCA stent     Cortical cataract of both eyes 3/18/2016    Delayed sleep phase syndrome 3/13/2019    Depression     Erectile dysfunction 3/24/2014    Erectile dysfunction 3/24/2014    Essential hypertension     GERD (gastroesophageal reflux disease) 7/25/2012    Gout, arthritis     Grade III open fracture of left tibia and fibula s/p ex-fix on 7/1/16 and ORIF of left tibia on 7/15 7/6/2016    H/O: iritis     Helicobacter pylori (H. pylori) infection     Treated    Hepatitis     Herpes simplex keratoconjunctivitis 9/30/2015    - on acyclovir - followed by opthalmology, Dr. Uribe     Herpes simplex keratoconjunctivitis 9/30/2015    - on acyclovir - followed by opthalmology, Dr. Uribe     Hyperkalemia 2/28/2017    Hyperlipidemia     Hypogonadism male     Hypogonadism male     Keloid cicatrix     Mixed anxiety and depressive disorder     Morbid obesity     Obstructive sleep apnea on CPAP     CPAP    Osteoarthritis of left knee 7/25/2012     "Paroxysmal atrial fibrillation 7/6/2016    Primary osteoarthritis of left knee 7/25/2012    Prominent aorta 1/25/2016    "RESULTS: THE HEART IS MILDLY ENLARGED WITH A SLIGHTLY PROMINENT AORTA" - Xray Chest PA & Lateral 12-     Prostate cancer 2/15/2016    - followed by urology, Dr. Young     Prostate cancer 2/15/2016    - followed by urology, Dr. 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    chest and forehead    Squamous cell cancer of skin of nose     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 7/6/2016    Type III open fracture of left tibia and fibula with routine healing 7/6/2016    Vitamin D deficiency disease     Vitreous detachment 3/18/2016       Past Surgical History:   Procedure Laterality Date    ADJACENT TISSUE TRANSFER Left 11/30/2020    Procedure: ADJACENT TISSUE TRANSFER x2 ;  Surgeon: Teresa Cooper MD;  Location: [facility] OR [address] [place] [address];  Service: Ophthalmology;  Laterality: Left;  Left glabellar 8.5x11x3 and Left upper eyelid advancement flap [id number]      Cardiac stenting x2      CATARACT EXTRACTION W/  INTRAOCULAR LENS IMPLANT Right 3/29/2016    Dr. Conteh    CATARACT EXTRACTION W/  INTRAOCULAR LENS IMPLANT Left 4/12/2016        COLONOSCOPY N/A 7/23/2020    Procedure: COLONOSCOPY;  Surgeon: Nico Lackey MD;  Location: [facility] ([address] [place] [address]);  Service: Endoscopy;  Laterality: N/A;    ESOPHAGOGASTRODUODENOSCOPY N/A 7/23/2020    Procedure: EGD (ESOPHAGOGASTRODUODENOSCOPY);  Surgeon: Nico Lackey MD;  Location: [facility] ([address] [place] [address]);  Service: Endoscopy;  Laterality: N/A;  per Dr Lackey-Will proceed with EGD and colonoscopy on the 2nd floor due to his comorbidities including obesity, sleep " "apnea, restrictive lung disease, coronary artery disease.  has loop recorder      ok to hold Eliquis 2 days per Dr Sandhu-must remain    EXTERNAL FIXATION TIBIAL FRACTURE Left 07/01/2016    INSERTION OF IMPLANTABLE LOOP RECORDER  04/07/2017    LEFT HEART CATHETERIZATION Left 1/30/2020    Procedure: Left heart cath;  Surgeon: Benjamin Sandhu MD;  Location: [facility] CATH LAB;  Service: Cardiology;  Laterality: Left;  RN PREOP 1/28/20--Pt starting Plavix loading dose today (8pills)- Dr. Sandhu aware.  Pt has a bandaged "non healing area to LLE"--Dr. Sandhu aware.    LEFT HEART CATHETERIZATION Left 2/14/2020    Procedure: Left heart cath, IVUS guided left main / LAD PCI. Noon start, radial approach;  Surgeon: Miguel Angel Brady MD;  Location: [facility] CATH LAB;  Service: Cardiology;  Laterality: Left;  RN PRE OP 2-7-2020  BMI--45.61    ORIF TIBIA FRACTURE Left 07/15/2016    PROBING OF NASOLACRIMAL DUCT WITH INSERTION OF TUBE Left 11/30/2020    Procedure: PROBING, NASOLACRIMAL DUCT,;  Surgeon: Teresa Cooper MD;  Location: [address];  Service: Ophthalmology;  Laterality: Left;    RADIOACTIVE SEED IMPLANTATION OF PROSTATE N/A 8/8/2018    Procedure: INSERTION, RADIOACTIVE SEED, PROSTATE;  Surgeon: Bipin Thompson MD;  Location: [address];  Service: Urology;  Laterality: N/A;  1 hour    SKIN BIOPSY      Squamous cell cancer removal x3 with Mohs surgery      TONSILLECTOMY      TOTAL KNEE ARTHROPLASTY  10/2012    TRIGGER FINGER RELEASE Right 10/14/2020    Procedure: RELEASE, TRIGGER FINGER - right;  Surgeon: Rad Swift MD;  Location: [facility] OR;  Service: Orthopedics;  Laterality: Right;    trus/bx      ULTRASOUND GUIDANCE  2/14/2020    Procedure: Ultrasound Guidance;  Surgeon: Miguel Angel Brady MD;  Location: [facility] CATH LAB;  Service: Cardiology;;     CR Chest 2/4/22:  Impression:     1. Symmetric coarse calcified pleural plaques of the bilateral hanh thoraces sparing the mediastinal pleura associated with " loculated pleural effusion, subpleural fibrosis and volume loss in the left lower lobe, not significantly changed and most compatible with asbestosis.  2. Fusiform aneurysmal degeneration of the ascending aorta up to 4.2-cm without evidence of impending rupture, not significantly changed.  3. Symmetric diffuse mild enlargement of the pulmonary arteries suggesting a component of pulmonary arterial hypertension, not significantly changed.        Electronically signed by: Álvaro Comer  Date:                                            02/22/2022  Time:                                           14:14      Pre-op Assessment    I have reviewed the Patient Summary Reports.     I have reviewed the Nursing Notes. I have reviewed the NPO Status.   I have reviewed the Medications.     Review of Systems  Anesthesia Hx:  No problems with previous Anesthesia  Denies Family Hx of Anesthesia complications.   Denies Personal Hx of Anesthesia complications.   Social:  Non-Smoker, Social Alcohol Use    Hematology/Oncology:  Hematology Normal      Current/Recent Cancer.   Cardiovascular:   Hypertension CAD  Dysrhythmias atrial fibrillation  Denies Angina. PURDY ECHO 4/20/22:  · The left ventricle is normal in size with concentric hypertrophy and normal systolic function.  · The estimated ejection fraction is 60%.  · Normal left ventricular diastolic function.  · Normal right ventricular size with normal right ventricular systolic function.  · Moderate left atrial enlargement.  · Mild-to-moderate aortic regurgitation.  · Normal central venous pressure (3 mmHg).  · The estimated PA systolic pressure is 22 mmHg.    EKG 4/11/22:  Normal sinus rhythm   Normal ECG   When compared with ECG of 01-DEC-2021 13:27,   No significant change was found   Confirmed by Ramón Joyner MD ([id number]) on 4/12/2022 5:35:09 PM    Pulmonary:   Shortness of breath Sleep Apnea SOB is not new   Renal/:   Chronic Renal Disease, CRI    Hepatic/GI:   GERD Reports  "hepatitis in high school unsure which one   Musculoskeletal:   Arthritis     Neurological:  Neurology Normal    Endocrine:  Endocrine Normal    Dermatological:  Skin Normal    Psych:   Psychiatric History depression          Physical Exam  General: Well nourished    Airway:  Mallampati: II / I  Mouth Opening: Small, but > 3cm  Tongue: Normal  Neck ROM: Normal ROM  Neck: Girth Increased  Large beard.  Hx of moderately difficult mask ventilation  Dental:  Intact    Chest/Lungs:  Clear to auscultation    Heart:  Rate: Normal  Rhythm: Regular Rhythm  Sounds: Normal        Anesthesia Plan  Type of Anesthesia, risks & benefits discussed:    Anesthesia Type: Gen ETT  Intra-op Monitoring Plan: Standard ASA Monitors  Post Op Pain Control Plan: multimodal analgesia  Airway Plan: Video, Post-Induction  Informed Consent: Informed consent signed with the Patient and all parties understand the risks and agree with anesthesia plan.  All questions answered. Patient consented to blood products? Yes  ASA Score: 3  Anesthesia Plan Notes: Followed by Dr. Sandhu PMHx significant for CAD - LM/ostial LAD POLO 2/14/20 POLO to RCA > 20 years ago, PAF on eliquis ,[employer] ILR, obesity, HTN, HLD, HF, bilateral carotid artery stenosis, PURDY, PVD  "Cleared for neck surgery at moderate cardiac risk  Ok to hold eliquis 3 days and plavix 5 days for neck surgery - resume both post-op when cleared by ENT"      Per pt eliquis stopped 3 days ago and plavix 5 days ago.      Ready For Surgery From Anesthesia Perspective.     .      "

## 2022-04-29 NOTE — PRE-PROCEDURE INSTRUCTIONS
Pre-operative instructions, medication directives and pain scales reviewed with patient. All questions the patient had were answered. Re-assurance about surgical procedure and day of surgery routine given as needed, patient verbalized understanding of the pre-op instructions.    Pt instructed to maintain NPO status starting the midnight before surgery (AM meds OK with a sip of water) and to avoid NSAIDs and vitamin/herbal supplements until after surgery. He was instructed to HOLD his Eliquis for 3 days and to HOLD Plavix for 5 days before surgery per Dr. Sandhu. Pt was given Hibiclens bath and instructed on its use. Both he and his wife verbalized understanding of these instructions. Pre-Op Center contact info has been given to pt for any additional questions.     His wife will accompany him on the day of surgery.     Will need T&S on day of surgery. Order placed.

## 2022-04-29 NOTE — DISCHARGE INSTRUCTIONS
Before 7 AM, enter through the Emergency Entrance..   After 7 AM enter through the Main Entrance.      Your procedure  is scheduled for Wednesday, May 4, 2022.    Call [phone] between 2pm and 5pm on Tuesday, May 3, 2022 to find out your arrival time for the day of surgery.    You may use the main entrance to the hospital on the [facility] side, or the entrance that is next to the garage.    You may have two visitors.  Visiting hours for non-COVID-19 patients expanded to 24/7 (still restricted to one visitor)  Youth visitation changed from age 18 to age 12.      You will be going to the Same Day Surgery Unit on the 2nd floor of the hospital.    Important instructions:  Do not eat anything after midnight.  You may have plain water, non carbonated.  You may also have Gatorade or Powerade after midnight.    Stop all fluids 2 hours before your surgery.    It is okay to brush your teeth.  Do not have gum, candy or mints.    SEE MEDICATION SHEET.   TAKE MEDICATIONS AS DIRECTED WITH SIPS OF WATER.      STOP taking  Ibuprofen,  Advil, Motrin, Mobic(meloxicam), Aleve (naproxen), Fish oil, and Vitamin E for at least 7 days before your surgery.     You may take Tylenol if needed which is not a blood thinner.    Please shower the night before and the morning of your surgery.      Follow any Prep Instructions given by your surgeon.      Use Hibiclens soap as instructed by your pre op nurse.   Please place clean linens on your bed the night before surgery. Please wear fresh clean clothing after each shower.    No shaving of procedural area at least 4-5 days before surgery due to increased risk of skin irritation and/or possible infection.    Contact lenses and removable denture work may not be worn during your procedure.    You may wear deodorant only. If you are having breast surgery, do not wear deodorant on the operative side.    Do not wear powder, body lotion, perfume/cologne or make-up.    Do not wear any  jewelry or have any metal on your body.    You will be asked to remove any dentures or partials for the procedure.    If you are going home on the same day of surgery, you must arrange for a family member or a friend to drive you home.  Public transportation is prohibited.  You will not be able to drive home if you were given anesthesia or sedation.    Patients who want to have their Post-op prescriptions filled from our in-house [facility], bring a Credit/Debit Card  or cash with you. A co-pay may be required.  The pharmacy closes at 5:30 pm.    Children under 18 years of age require a parent/guardian present the entire time that they are here.    Wear loose fitting clothes allowing for bandages.    Please leave money and valuables home.      You may bring your cell phone.    Call the doctor if fever or illness should occur before your surgery.    Call [phone] to contact us here if needed.

## 2022-05-03 NOTE — PROGRESS NOTES
Pt presents for routine foot care earlier than insurance allows due to pain in callus affecting ambulation    Presents in flexible reebok to the right foot and darco shoe to the left    Focal hyperkeratotic lesion consisting entirely of hyperkeratotic tissue without open skin, drainage, pus, fluctuance, malodor, or signs of infection: posterior heel and distal hallux    The encounter diagnosis was Pre-ulcerative calluses.     After cleansing the  area w/ alcohol prep pad the above mentioned hyperkeratosis was trimmed utilizing No 15 scapel, to a smooth base with out incident. Patient tolerated this  well and reported comfort to the area of  posterior heel and distal hallux    Strongly advised he acquire recommended shoe gear. Skin is still delicate therefore patient must be diligent in avoiding excessive pressure and making sure there is adequate support and padding in shoe gear.      Patient tolerated well and related relief. RTC for usual routine visit as scheduled

## 2022-05-03 NOTE — PATIENT INSTRUCTIONS
Over the counter pain creams: Voltaren Gel, Biofreeze, Bengay, tiger balm, two old goat, lidocaine gel,  Absorbine Veterinary Liniment Gel Topical Analgesic Sore Muscle and Joint Pain Relief    Recommend lotions: eucerin, eucerin for diabetics, aquaphor, A&D ointment, gold bond for diabetics, sween, Callender's Bees all purpose baby ointment,  urea 40 with aloe (found on amazon.com)    Shoe recommendations: (try 6pm.com, zappos.com , nordstromrack.United Maps, or shoes.United Maps for discounted prices) you can visit DSW shoes in [place]  or [place], [place] in the [place] (there are also several shoe brand outlets in the [place])    Asics (GT 2000 or gel foundations), new balance stability type shoes (such as the 940 series), saucony (stabil c3),  Knight (GTS or Beast or transcend), propet, carola, Hoka One Bondi 7 (tennis shoe)    Trang Santiago (women) Joaquin&Ike (men), clarks, crocs, aerosoles, naturalizers, SAS, ecco, born, sindy doan, rockports (dress shoes)    Vionic, burkenstocks, fitflops, propet (sandals)  Nike comfort thong sandals, crocs, propet (house shoes)    Nail Home remedy:  Vicks Vapor rub or Emuaid to nails for easier manageability    Betadine twice daily to previous wound sites        Step-by-Step:  Inspecting Your Feet     Date Last Reviewed: 10/1/2016  © 9917-2896 [employer]. [address] [place] [address]. All rights reserved. This information is not intended as a substitute for professional medical care. Always follow your healthcare professional's instructions.

## 2022-05-04 NOTE — TRANSFER OF CARE
Anesthesia Transfer of Care Note    Patient: Janusz Montgomery Jr.    Procedure(s) Performed: Procedure(s) (LRB):  RADICAL NECK DISSECTION (Right)  LARYNGOSCOPY, DIRECT possible biopsy (N/A)    Patient location: PACU    Anesthesia Type: general    Transport from OR: Transported from OR on 100% O2 by closed face mask with adequate spontaneous ventilation    Post pain: adequate analgesia    Post assessment: no apparent anesthetic complications and tolerated procedure well    Post vital signs: stable    Level of consciousness: awake and alert    Nausea/Vomiting: no nausea/vomiting    Complications: none    Transfer of care protocol was followed      Last vitals:   Visit Vitals  /61 (BP Location: Left arm, Patient Position: Lying)   Pulse 92   Temp 36.8 °C (98.3 °F) (Axillary)   Resp 17   Wt (!) 158.8 kg (350 lb)   SpO2 97%   BMI 46.18 kg/m²

## 2022-05-04 NOTE — BRIEF OP NOTE
[facility] - Surgery  Brief Operative Note    SUMMARY     Surgery Date: 5/4/2022     Surgeon(s) and Role:     * Etta Pena MD - Primary    Assisting Surgeon: None    Pre-op Diagnosis:  Head and neck cancer [C76.0]    Post-op Diagnosis:  Post-Op Diagnosis Codes:     * Head and neck cancer [C76.0]    Procedure(s) (LRB):  RADICAL NECK DISSECTION (Right)  LARYNGOSCOPY, DIRECT possible biopsy (N/A)    Anesthesia: General    Operative Findings: aggressive tumor invading accessory nerve, scm and internal jugular vein extending to floor of neck, able to carefully dissect off of phrenic nerve.     Estimated Blood Loss: 200 mL             Specimens:   Specimen (24h ago, onward)             Start     Ordered    05/04/22 1530  Specimen to Pathology, Surgery ENT  Once        Comments: Pre-op Diagnosis: Head and neck cancer [C76.0]Procedure(s):DISSECTION, NECKLARYNGOSCOPY, DIRECT possible biopsy Number of specimens: 1Name of specimens: Neck dissection level 2-5     References:    Click here for ordering Quick Tip   Question Answer Comment   Procedure Type: ENT    Specimen Class: Routine/Screening    Which provider would you like to cc? ETTA PENA    Release to patient Immediate        05/04/22 0698                [id number]

## 2022-05-04 NOTE — PLAN OF CARE
Report called to 4th floor rn , pt transported to room 431 via bed, arouses to voice, vital signs stable, denies pain at this time , incision to neck clean intact , staples intact, no drainage noted, erika drain sutured to anterior neck insicion, no distress noted pt denies any needs at this time,

## 2022-05-05 PROBLEM — C76.0 HEAD AND NECK CANCER: Status: ACTIVE | Noted: 2022-01-01

## 2022-05-05 NOTE — OP NOTE
[facility]  Surgery Department  Operative Note    SUMMARY     Date of Procedure: 5/4/2022     Procedure: Procedure(s) (LRB):  RADICAL NECK DISSECTION (Right)   Level 2-5 with sacrifice of portion of SCM, internal jugular vein and accessory nerve due to tumor invasion   LARYNGOSCOPY, DIRECT possible biopsy (N/A)     Surgeon(s) and Role:     * Etta Higgins MD - Primary    Assisting Surgeon: None    Pre-Operative Diagnosis: Head and neck cancer [C76.0]    Post-Operative Diagnosis: Post-Op Diagnosis Codes:     * Head and neck cancer [C76.0]    Anesthesia: General    Operative Findings (including complications, if any): aggressive tumor invading accessory nerve, scm and internal jugular vein extending to floor of neck, able to carefully dissect off of phrenic nerve. intimately associated with transverse cervical artery which was ligated. 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 Present at multidisciplinary tumor board, recommended 2-5 neck dissection and DL    Operative report in detail: The patient was identified in the holding area per routine. He was taken to the operating room and placed supine on the operating table. The patient was intubated transorally by the anesthesiology service, and an adequate level of general endotracheal anesthesia was achieved. The head of the patient's bed was rotated 90 degrees away from anesthesia. His eyes were protected with tape, and a maxillary tooth guard  was placed. The patient was draped in the standard fashion for laryngoscopy, and a timeout was performed and the correct patient and procedure were identified.    The dedo laryngoscope was inserted in the patient's oral cavity and  advanced to visualize the posterior oropharynx, hypopharynx, and endolarynx, with the above findings noted.  Visualization was optimized with the 0 degree telescope. Photodocumentation was obtained. No overt lesion, had some thrush type material throughout pharynx that was easily removed with suction.     The scope and tooth protector were then removed, and this portion of the procedure terminated.     We then prepared the patient for the neck dissection portion of the procedure. A modified J incision was made on the right neck and skin and subcutaneous tissues incised using bovie electrocautery. The platysma was divided and subplatysmal flaps were raised. The fibroadipose tissue of levels 2a,2b,3 and 4  and 5 were removed. This was achieved with the following steps: The fascia underneath the submandibular gland was incised to identify the digastric muscle. This was followed posteriorly to the location of the internal jugular vein. The fascia anterior to the sternocleidomastoid muscle (SCM) was then skeletonized off of the muscle from this location inferiorly to the level of the omohyoid muscle. The accessory nerve was identified and skeletonized as it approached the internal jugular vein . Fibrofatty tissue was idssected down to the floor of the neck and then rotated medially. The large tumor was extending from level 5 . The accessory nerve was followed through the sternocleidomastoid muscled and was entering the tumor in several areas and could not be safely dissociated from the cancer. The fibrofatty tissue between the trapezius and posterior scm was incised and carried forward to the tumor mass. This was a large mass that extended deep into the neck and required meticulous care to safely dissect from the floor of the neck. The transverse cervical artery was encountered and ligated. The tumor was carefully dissected from the scalene muscles. Attempt was made to roll the mass under the scm but it was fairly fixed.  mobilization was provided with resection of portion of scm that appeared to have tumor invasion. Sacrifice of accessory nerve was required as there were several locations where tumor was encasing the nerve. The tumor was initimately associated with the internal jugular vein and was unable to mobilize to remove without sacrificing the internal jugular vein. This was done at superior and inferior extents .  There was still difficulty with mobilizing the packet with inferior ligation however after superior ligation, packet mobilizing and with extremely careful dissection was able to be freed from carotid artery fascia at the inferior portion of the packet.  Additional fibrofatty tissue between the anterior jugular vein and internal jugular vein was removed with the packet and the packet was dissected carefully with 15 blade and bipolar for the internal jugular vein. The specimen was passed off of the field. The wound bed was irrigated copiously and valsalva performed. There was excellent hemostasis. A 10 flat VENU drain was then placed into the wound bed and through a separate stab incision in the neck. The drain was sutured to the skin with 3-0 silk suture. The incision was then closed using 3-0 and 4-0 vicryl suture for deep closure and the skin was closed with staples. The patient was handed over to anesthesia for extubation which was done without issue.     Complications: None    Disposition: to recovery     Significant Surgical Tasks Conducted by the Assistant(s), if Applicable: none    Estimated Blood Loss (EBL): 200 mL           Implants:none  Drain: 10 flat VENU drain   Specimens:   Specimen (24h ago, onward)             Start     Ordered    05/04/22 1530  Specimen to Pathology, Surgery ENT  Once        Comments: Pre-op Diagnosis: Head and neck cancer [C76.0]Procedure(s):DISSECTION, NECKLARYNGOSCOPY, DIRECT possible biopsy Number of specimens: 1Name of specimens: Neck dissection level 2-5     References:    Click  here for ordering Quick Tip   Question Answer Comment   Procedure Type: ENT    Specimen Class: Routine/Screening    Which provider would you like to cc? CHANDLER PENA    Release to patient Immediate        05/04/22 7536                        Condition: Good    Disposition: PACU - hemodynamically stable.    Attestation: I was present and scrubbed for the entire procedure.

## 2022-05-05 NOTE — PLAN OF CARE
Problem: Adult Inpatient Plan of Care  Goal: Plan of Care Review  Outcome: Ongoing, Progressing  Goal: Patient-Specific Goal (Individualized)  Outcome: Ongoing, Progressing  Goal: Absence of Hospital-Acquired Illness or Injury  Outcome: Ongoing, Progressing  Goal: Optimal Comfort and Wellbeing  Outcome: Ongoing, Progressing  Goal: Readiness for Transition of Care  Outcome: Ongoing, Progressing     Problem: Bariatric Environmental Safety  Goal: Safety Maintained with Care  Outcome: Ongoing, Progressing     Problem: Infection  Goal: Absence of Infection Signs and Symptoms  Outcome: Ongoing, Progressing       Patient remained free from falls/injury throughout shift.  No acute changes in status.  No complaints of pain.  Bed locked in lowest position.  Side rails up x2.  Call bell within reach.  Purposeful rounding maintained throughout shift.

## 2022-05-05 NOTE — PROGRESS NOTES
[facility]  Otorhinolaryngology-Head & Neck Surgery  Progress Note    Patient Name: Janusz Montgomery Jr.  MRN: [id number]  Code Status: Prior  Admission Date: 5/4/2022  Hospital Length of Stay: 1 days  Attending Physician: Etta Higgins MD  Primary Care Provider: Miguelina Weathers MD    Subjective:     Interval History: having some mild sob. No chest pain. Pinky and forefinger numbness resolved. Mild shoulder pain. Pain is better than preop however. No issue with arm movement     Post-Op Info:  Procedure(s) (LRB):  RADICAL NECK DISSECTION (Right)  LARYNGOSCOPY, DIRECT possible biopsy (N/A)   1 Day Post-Op  Hospital Day: 2      Medications:  Continuous Infusions:  Scheduled Meds:   acyclovir  800 mg Oral BID    furosemide  20 mg Oral Daily    gabapentin  300 mg Oral TID    losartan  12.5 mg Oral Daily    melatonin  9 mg Oral Nightly    metoprolol succinate  25 mg Oral Daily    oxybutynin  5 mg Oral Daily    tamsulosin  1 capsule Oral Daily    venlafaxine  150 mg Oral BID     PRN Meds:albuterol-ipratropium, artificial tears, morphine, ondansetron, oxyCODONE-acetaminophen, sodium chloride 0.9%, sodium chloride 0.9%, traZODone     Objective:     Vital Signs (24h Range):  Temp:  [97.6 °F (36.4 °C)-98.4 °F (36.9 °C)] 97.6 °F (36.4 °C)  Pulse:  [] 94  Resp:  [15-24] 20  SpO2:  [92 %-97 %] 97 %  BP: ()/(45-63) 129/57  Arterial Line BP: ()/(28-44) 110/44     Date 05/05/22 0700 - 05/06/22 0659   Shift 9060-8451 4044-2114 8095-1283 24 Hour Total   INTAKE   P.O. 240   240   Shift Total(mL/kg) 240(1.5)   240(1.5)   OUTPUT   Urine(mL/kg/hr) 1175   1175   Drains 30   30   Shift Total(mL/kg) 1205(7.6)   1205(7.6)   Weight (kg) 158.8 158.8 158.8 158.8     Lines/Drains/Airways     Drain  Duration                Urethral Catheter 05/04/22 1036 Double-lumen;Latex 16 Fr. 1 day         Closed/Suction Drain 05/04/22 1535 Right Neck Bulb 10 Fr. <1 day          Peripheral Intravenous Line  Duration                 Peripheral IV - Single Lumen 10/14/20 0716 22 G Left Wrist 568 days         Peripheral IV - Single Lumen 11/30/20 1355 20 G Left Forearm 520 days         Peripheral IV - Single Lumen 05/04/22 0810 20 G Left;Posterior Forearm 1 day                Physical Exam  Vitals reviewed.   HENT:      Head: Normocephalic.   Neck:      Trachea: Trachea normal.      Comments: ERIKA drain with sang fluid  Neck incision c/d/i with staples  Musculoskeletal:      Comments: Strong hand , able to lift arm and bend at elbow with good strength    Neurological:      Mental Status: He is alert.   Psychiatric:         Mood and Affect: Mood normal.         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

## 2022-05-05 NOTE — NURSING
"Patient called the  c/o SOB. At same time, monitor tech called and reported patient O2 was 66% and then increased back up to 96%.  Upon assessment, Pt reports that he ambulated to and from bathroom and upon sitting down on edge of bed he started feeling "winded" and having difficulty breathing. Oxygen currently 98% on 3L nasal cannula.    Breath sounds stable. RT called and prn treatment requested. Dr. Higgins contacted and made aware.     Pt currently resting in bed getting neb treatment.   "

## 2022-05-05 NOTE — PLAN OF CARE
[facility]  Initial Discharge Assessment    Patient from home and lives with wife. Pt wife at bedside. Pt stated he had been taking care of his wife, and would benefit from home health upon discharge. Pt has had [facility] with no issues.     Currently he uses a bsc, rw and cpap at home. He had not received home oxygen when he was discharge from a hospital stay a couple months ago. Also stated his cpap is suppose to be replaced, but has not heard any updated. Pt believed his cpap is set up through [facility].        Primary Care Provider: Miguelina Weathers MD    Admission Diagnosis: Head and neck cancer [C76.0]    Admission Date: 5/4/2022  Expected Discharge Date:     Discharge Barriers Identified: None    Payor: [employer] MEDICARE / Plan: [employer] MEDICARE HMO / Product Type: Capitation /     Extended Emergency Contact Information  Primary Emergency Contact: Bri Montgomery  Address: [address] [place] [address] [facility]  Home Phone: [phone]  Mobile Phone: [phone]  Relation: Spouse    Discharge Plan A: Home Health  Discharge Plan B: Home with family      [facility] [address] [place], [place] [address] [facility]  [address] [facility] [address]  Phone: [phone] Fax: [phone]      Initial Assessment (most recent)     Adult Discharge Assessment - 05/05/22 1508        Discharge Assessment    Assessment Type Discharge Planning Assessment     Confirmed/corrected address, phone number and insurance Yes     Confirmed Demographics Correct on Facesheet     Source of Information patient;family;health record     Does patient/caregiver understand observation status No     Was observation education provided? No     Communicated KIARA with patient/caregiver Date not available/Unable to determine     Reason For Admission Head and neck cancer     Lives With spouse     Facility Arrived From: Home     Do you expect to return to your current living situation? Yes      Do you have help at home or someone to help you manage your care at home? No     Prior to hospitilization cognitive status: Alert/Oriented     Current cognitive status: Alert/Oriented     Walking or Climbing Stairs Difficulty ambulation difficulty, requires equipment     Dressing/Bathing Difficulty none     Equipment Currently Used at Home walker, rolling;bedside commode;CPAP     Readmission within 30 days? No     Patient currently being followed by outpatient case management? No     Do you currently have service(s) that help you manage your care at home? No     Do you take prescription medications? Yes     Do you have prescription coverage? Yes     Coverage [employer]     Do you have any problems affording any of your prescribed medications? No     Is the patient taking medications as prescribed? yes     Who is going to help you get home at discharge? UlisesBri (Spouse)   [phone]     How do you get to doctors appointments? car, drives self     Are you on dialysis? No     Do you take coumadin? No     Discharge Plan A Home Health     Discharge Plan B Home with family     DME Needed Upon Discharge  --   TBD    Discharge Plan discussed with: Spouse/sig other;Patient     Name(s) and Number(s) Bri Montgomery (Spouse)   [phone]     Discharge Barriers Identified None        Relationship/Environment    Name(s) of Who Lives With Patient UlisesBri (Spouse)   [phone]

## 2022-05-05 NOTE — CONSULTS
Consult for home health as requested by patient. TN informed therapy recommendations for outpt therapy, explained differences in level of care for HH vs out patient therapy.     Patient prefers home health. Sent referral packet sent to [facility] via care port, pending plan of care orders upon discharge.TN to continue to follow for discharge needs.

## 2022-05-05 NOTE — PLAN OF CARE
Problem: Physical Therapy  Goal: Physical Therapy Goal  Description: Goals to be met by: 5/19/22    Patient will increase functional independence with mobility by performing:    Supine to sit with [place]  Sit to supine with [place]  Sit to stand transfer with Modified [place]  Bed to chair transfer with Modified [place] using Rolling Walker  Gait  x 200 feet with Modified [place] using Rolling Walker.   Stand for 4 minutes with Modified [place] using Rolling Walker while maintaining spo2 > 90%  Lower extremity exercise program x10 reps per handout, with independence    Outcome: Ongoing, Progressing   Pt is AAOx4, and is currently: CGA c supine>sit, sit>stand from bed, bed>chair, and ambulation ( x 244' c RW). Acute services are needed c PT recommending O2 and Outpatient PT  upon d/c.     O2  3L upon PT entry: 94%  On RA while standing: dsat to 86% c pt unable to recover to 90  Pt placed back on 3L: maintained > 94% while ambulating and throughout remainder of session.

## 2022-05-05 NOTE — PLAN OF CARE
Problem: Occupational Therapy  Goal: Occupational Therapy Goal  Description: Goals to be met by: 5/19/22     Patient will increase functional independence with ADLs by performing:    LE Dressing with Modified [place].  Grooming while standing at sink with Modified [place].  Toileting from toilet with Modified [place] for hygiene and clothing management.   Supine to sit with Modified [place].  Step transfer with Modified [place]  Toilet transfer to toilet with Modified [place].  Upper extremity exercise program x15 reps per handout, with independence.  Implementation of PLB and energy conservation strategies into daily routines w/ (I).     Outcome: Ongoing, Progressing    Pt very pleasant and willing to participate in tx session this date; pt was able to perform bed mobility w/ SBA to stand and ambulate household distances w/ CGA and use of RW. Pt w/ SPO2 87-88% on RA; requiring 3L O2 NC for maintaining SPO2>92%. Pt's BUE ROM and strength are WFL, denies numbness and tingling. Pt will continue to benefit from skilled acute OT services to maximize functional capacity for safe performance w/ ADLs and functional mobility.
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
## 2022-05-05 NOTE — NURSING
Patient resting in bed, family at bedside. Patient reports that breathing has improved, denies any SOB at this time. Pt denies pain, just slight discomfort at incision site. Drain still in place and draining. Site C/D/I.     Pt ambulated in [place]avez with Physical therapy today and then up to bathroom independently. Pt sat in chair for most of the shift. Ate meals independently and without difficulty.

## 2022-05-05 NOTE — PT/OT/SLP EVAL
Occupational Therapy   Evaluation    Name: Janusz Montgomery Jr.  MRN: [id number]  Admitting Diagnosis:  Head and neck cancer  Recent Surgery: Procedure(s) (LRB):  RADICAL NECK DISSECTION (Right)  LARYNGOSCOPY, DIRECT possible biopsy (N/A) 1 Day Post-Op    Recommendations:     Discharge Recommendations: outpatient PT, outpatient OT  Discharge Equipment Recommendations:   (Oxygen?)  Barriers to discharge:  None    Assessment:     Janusz Montgomery Jr. is a 77 y.o. male with a medical diagnosis of Head and neck cancer. Performance deficits affecting function: weakness, impaired endurance, impaired self care skills, impaired functional mobilty, gait instability, impaired balance, decreased upper extremity function, decreased lower extremity function, decreased safety awareness, decreased ROM, impaired skin, edema, impaired cardiopulmonary response to activity.      Pt very pleasant and willing to participate in tx session this date; pt was able to perform bed mobility w/ SBA to stand and ambulate household distances w/ CGA and use of RW. Pt w/ SPO2 87-88% on RA; requiring 3L O2 NC for maintaining SPO2>92%. Pt's BUE ROM and strength are WFL, denies numbness and tingling. Pt will continue to benefit from skilled acute OT services to maximize functional capacity for safe performance w/ ADLs and functional mobility.     Rehab Prognosis: Good; patient would benefit from acute skilled OT services to address these deficits and reach maximum level of function.       Plan:     Patient to be seen 3 x/week, 5 x/week to address the above listed problems via self-care/home management, therapeutic activities, therapeutic exercises  · Plan of Care Expires: 05/19/22  · Plan of Care Reviewed with: patient, spouse, daughter    Subjective     Chief Complaint: Pt w/ reports of feeling unsteady.   Patient/Family Comments/goals: To return to PLOF.     Occupational Profile:  Living Environment: Pt lives w/ spouse in [facility] w/ 0 [address]ON[address]; pt uses a walk-in  tub w/ built in bench.   Previous level of function: Pt was (I) w/ ADLs and functional mobility PTA.   Roles and Routines: Pt reports he mostly stays home on the computer. Pt is retired.   Equipment Used at Home:  walker, rolling, bedside commode (Hurry Cane)  Assistance upon Discharge: Pt will have assistance from dtr and spouse.     Pain/Comfort:  · Pain Rating 1: 0/10  · Pain Rating Post-Intervention 1: 0/10    Patients cultural, spiritual, Yazidi conflicts given the current situation: no    Objective:     Communicated with: Nurse prior to session.  Patient found HOB elevated with telemetry, VENU drain, oxygen upon OT entry to room.    General Precautions: Standard, fall   Orthopedic Precautions:N/A   Braces: N/A  Respiratory Status: Nasal cannula, flow 3 L/min; pt ambulated household distances on RA w/ SPO2 88%, requiring 3L O2 tank for maintaining SPO2 >92%    Occupational Performance:    Bed Mobility:    · Patient completed Scooting/Bridging with stand by assistance and contact guard assistance  · Patient completed Supine to Sit with stand by assistance and contact guard assistance    Functional Mobility/Transfers:  · Patient completed Sit <> Stand Transfer with contact guard assistance  with  rolling walker   · Patient completed Bed <> Chair Transfer using Step Transfer technique with contact guard assistance with rolling walker  · Functional Mobility: Pt was able to ambulate household distances w/ CGA and use of RW; pt required ~3 standing rest breaks for catching breath. Pt initiated ambulation on RA and desat to upper 80s but quickly recovered to lower 90s w/ PLB and 3L O2 NC.     Activities of Daily Living:  · Upper Body Dressing: minimum assistance for donning gown over back    Cognitive/Visual Perceptual:  Cognitive/Psychosocial Skills:     -       Oriented to: Person, Place, Time and Situation   -       Follows Commands/attention:Follows multistep  commands  -       Communication: clear/fluent  -        Memory: No Deficits noted  -       Safety awareness/insight to disability: intact     Physical Exam:  Balance:    -       good sitting balance; fair + standing balance  Postural examination/scapula alignment:    -       Rounded shoulders  -       Forward head  Skin integrity: Incision to neck dry; minimal dried blood   Edema:  Mild R side of neck; VENU drain intact; incision from behind ear to mid neck    Sensation:    -       Intact  Upper Extremity Range of Motion:     -       Right Upper Extremity: WFL  -       Left Upper Extremity: WFL  Upper Extremity Strength:    -       Right Upper Extremity: WFL  -       Left Upper Extremity: WFL   Strength:    -       Right Upper Extremity: WFL  -       Left Upper Extremity: WFL    AMPAC 6 Click ADL:  AMPAC Total Score: 24    Treatment & Education:  -Initial eval complete.   -Pt educate on role of OT and POC.   -Pt educated on safe functional mobility.   -Pt educated on gentle BUE AROM w/ focus on shoulders and trapezius. Pt performed the following for 1 set x 10 reps while seated in chair:    -shoulder flexion    -shoulder abd   -scapular elevation    -scapular retraction/protraction    -forward/reverse shoulder rotation   -elbow flexion   -Pt inquired about incentive spirometer; pt was educated on use and was able to perform x 10 breaths. Pt w/ reports of having difficulty for fully expanding lungs. Pt was encouraged to perform at least 3x/day for 10 reps each.   -Questions and concerns addressed within OT scope.  Education:    Patient left up in chair with all lines intact, call button in reach and family present    GOALS:   Multidisciplinary Problems     Occupational Therapy Goals        Problem: Occupational Therapy    Goal Priority Disciplines Outcome Interventions   Occupational Therapy Goal     OT, PT/OT Ongoing, Progressing    Description: Goals to be met by: 5/19/22     Patient will increase functional independence with ADLs by performing:    LE Dressing with  Modified [place].  Grooming while standing at sink with Modified [place].  Toileting from toilet with Modified [place] for hygiene and clothing management.   Supine to sit with Modified [place].  Step transfer with Modified [place]  Toilet transfer to toilet with Modified [place].  Upper extremity exercise program x15 reps per handout, with independence.  Implementation of PLB and energy conservation strategies into daily routines w/ (I).                      History:     Past Medical History:   Diagnosis Date    (HFpEF) heart failure with preserved ejection fraction 2/4/2022    NAT (acute kidney injury) 3/19/2017    ALLERGIC RHINITIS     Anemia     Anxiety     Basal cell carcinoma 10/19/2018    forehead and right medial shoulder    Basal cell carcinoma 01/09/2019    left nasal bridge and left posterior ear    Basal cell carcinoma 06/12/2020    left lower medial leg - Efudex    Basal cell carcinoma 11/30/2020    left medial upper eyelid canthus superficial BCC    Chronic rhinitis 5/3/2013    Chronic rhinitis 5/3/2013    Coronary artery disease involving native coronary artery of native heart without angina pectoris s/p RCA stent     Cortical cataract of both eyes 3/18/2016    Delayed sleep phase syndrome 3/13/2019    Depression     Erectile dysfunction 3/24/2014    Erectile dysfunction 3/24/2014    Essential hypertension     GERD (gastroesophageal reflux disease) 7/25/2012    Gout, arthritis     Grade III open fracture of left tibia and fibula s/p ex-fix on 7/1/16 and ORIF of left tibia on 7/15 7/6/2016    H/O: iritis     Helicobacter pylori (H. pylori) infection     Treated    Hepatitis     Herpes simplex keratoconjunctivitis 9/30/2015    - on acyclovir - followed by opthalmology, Dr. Uribe     Herpes simplex keratoconjunctivitis 9/30/2015    - on acyclovir - followed by opthalmology, Dr. Uribe     Hyperkalemia 2/28/2017    Hyperlipidemia     Hypogonadism  "male     Hypogonadism male     Keloid cicatrix     Mixed anxiety and depressive disorder     Morbid obesity     Obstructive sleep apnea on CPAP     CPAP    Osteoarthritis of left knee 7/25/2012    Paroxysmal atrial fibrillation 7/6/2016    Primary osteoarthritis of left knee 7/25/2012    Prominent aorta 1/25/2016    "RESULTS: THE HEART IS MILDLY ENLARGED WITH A SLIGHTLY PROMINENT AORTA" - Xray Chest PA & Lateral 12-     Prostate cancer 2/15/2016    - followed by urology, Dr. Young     Prostate cancer 2/15/2016    - followed by urology, Dr. 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    chest and forehead    Squamous cell cancer of skin of nose     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 7/6/2016    Type III open fracture of left tibia and fibula with routine healing 7/6/2016    Vitamin D deficiency disease     Vitreous detachment 3/18/2016       Past Surgical History:   Procedure Laterality Date    ADJACENT TISSUE TRANSFER Left 11/30/2020    Procedure: ADJACENT TISSUE TRANSFER x2 ;  Surgeon: Teresa Cooper MD;  Location: [facility] OR [address] [place] [address];  Service: Ophthalmology;  Laterality: Left;  Left glabellar 8.5x11x3 and Left upper eyelid advancement flap [id number]      Cardiac stenting x2      CATARACT EXTRACTION W/  INTRAOCULAR LENS IMPLANT Right 3/29/2016    Dr. Conteh    CATARACT EXTRACTION W/  INTRAOCULAR LENS IMPLANT Left 4/12/2016        COLONOSCOPY N/A 7/23/2020    Procedure: COLONOSCOPY;  Surgeon: Nico Lackey MD;  Location: [facility] (2ND FLR);  Service: Endoscopy;  Laterality: N/A;    ESOPHAGOGASTRODUODENOSCOPY N/A 7/23/2020    Procedure: EGD (ESOPHAGOGASTRODUODENOSCOPY);  Surgeon: Nico PARSONS" "MD Ziyad;  Location: [facility] (2ND FLR);  Service: Endoscopy;  Laterality: N/A;  per Dr Lackey-Will proceed with EGD and colonoscopy on the 2nd floor due to his comorbidities including obesity, sleep apnea, restrictive lung disease, coronary artery disease.  has loop recorder      ok to hold Eliquis 2 days per Dr Sandhu-must remain    EXTERNAL FIXATION TIBIAL FRACTURE Left 07/01/2016    INSERTION OF IMPLANTABLE LOOP RECORDER  04/07/2017    LEFT HEART CATHETERIZATION Left 1/30/2020    Procedure: Left heart cath;  Surgeon: Benjamin Sandhu MD;  Location: [facility] CATH LAB;  Service: Cardiology;  Laterality: Left;  RN PREOP 1/28/20--Pt starting Plavix loading dose today (8pills)- Dr. Sandhu aware.  Pt has a bandaged "non healing area to LLE"--Dr. Sandhu aware.    LEFT HEART CATHETERIZATION Left 2/14/2020    Procedure: Left heart cath, IVUS guided left main / LAD PCI. Noon start, radial approach;  Surgeon: Miguel Angel Brady MD;  Location: [facility] CATH LAB;  Service: Cardiology;  Laterality: Left;  RN PRE OP 2-7-2020  BMI--45.61    ORIF TIBIA FRACTURE Left 07/15/2016    PROBING OF NASOLACRIMAL DUCT WITH INSERTION OF TUBE Left 11/30/2020    Procedure: PROBING, NASOLACRIMAL DUCT,;  Surgeon: Teresa Cooper MD;  Location: [address];  Service: Ophthalmology;  Laterality: Left;    RADIOACTIVE SEED IMPLANTATION OF PROSTATE N/A 8/8/2018    Procedure: INSERTION, RADIOACTIVE SEED, PROSTATE;  Surgeon: Bipin Thompson MD;  Location: [address];  Service: Urology;  Laterality: N/A;  1 hour    SKIN BIOPSY      Squamous cell cancer removal x3 with Mohs surgery      TONSILLECTOMY      TOTAL KNEE ARTHROPLASTY  10/2012    TRIGGER FINGER RELEASE Right 10/14/2020    Procedure: RELEASE, TRIGGER FINGER - right;  Surgeon: Rad Swift MD;  Location: [facility];  Service: Orthopedics;  Laterality: Right;    trus/bx      ULTRASOUND GUIDANCE  2/14/2020    Procedure: Ultrasound Guidance;  Surgeon: Miguel Angel Brady MD;  Location: " [facility] CATH LAB;  Service: Cardiology;;       Time Tracking:     OT Date of Treatment: 05/05/22  OT Start Time: 1014  OT Stop Time: 1045  OT Total Time (min): 31 min    Billable Minutes:Evaluation 15  Therapeutic Exercise 8  Total Time 38 (co-eval w/ PT)     5/5/2022

## 2022-05-06 PROBLEM — K59.00 CONSTIPATION: Status: ACTIVE | Noted: 2022-01-01

## 2022-05-06 PROBLEM — R09.02 HYPOXIA: Status: ACTIVE | Noted: 2022-01-01

## 2022-05-06 PROBLEM — I50.32 CHRONIC HEART FAILURE WITH PRESERVED EJECTION FRACTION: Status: ACTIVE | Noted: 2022-01-01

## 2022-05-06 NOTE — ASSESSMENT & PLAN NOTE
Body mass index is 46.18 kg/m². Morbid obesity complicates all aspects of disease management from diagnostic modalities to treatment. Weight loss encouraged and health benefits explained to patient.

## 2022-05-06 NOTE — ASSESSMENT & PLAN NOTE
Follows with Pulmonary  On APAP 14-16 at home  Currently respiratory is adding O2 to this- may need to do this on discharge  Morbid obesity contributes to CHARISMA

## 2022-05-06 NOTE — ASSESSMENT & PLAN NOTE
No signs of acute exacerbation at this time. No edema.   BNP  Recent Labs   Lab 05/06/22  1311   BNP 46     Last TTE 4/2022 with EF 60%  Continue home lasix

## 2022-05-06 NOTE — ASSESSMENT & PLAN NOTE
CXR with pleural plaques. No effusions noted.   PFTs 2020 with restrictive lung disease. Per Pulmonary, restrictive physiology is more likely due to habitus

## 2022-05-06 NOTE — NURSING
Patient currently resting in bed, reports that SOB has subsided. States that he feels comfortable now.

## 2022-05-06 NOTE — ASSESSMENT & PLAN NOTE
Long standing dyspnea on exertion due to HFpEF, obesity, PHTN, CHARISMA, asbestosis  - PFTs 2020 with restrictive physiology- likely due to habitus  - TTE with EF 60% on last check 2022. He has had diastolic dysfunction in past. Does not appear volume overloaded currently  - reports he was ordered home O2 but it did not arrive    Continue home PO lasix  Incentive spirometer  Home APAP (at bedside)  Test for home O2- nursing aware, communication placed. Will order after test completed.   Pulmonary follow up on discharge

## 2022-05-06 NOTE — SUBJECTIVE & OBJECTIVE
"Past Medical History:   Diagnosis Date    (HFpEF) heart failure with preserved ejection fraction 2/4/2022    NAT (acute kidney injury) 3/19/2017    ALLERGIC RHINITIS     Anemia     Anxiety     Basal cell carcinoma 10/19/2018    forehead and right medial shoulder    Basal cell carcinoma 01/09/2019    left nasal bridge and left posterior ear    Basal cell carcinoma 06/12/2020    left lower medial leg - Efudex    Basal cell carcinoma 11/30/2020    left medial upper eyelid canthus superficial BCC    Chronic rhinitis 5/3/2013    Chronic rhinitis 5/3/2013    Coronary artery disease involving native coronary artery of native heart without angina pectoris s/p RCA stent     Cortical cataract of both eyes 3/18/2016    Delayed sleep phase syndrome 3/13/2019    Depression     Erectile dysfunction 3/24/2014    Erectile dysfunction 3/24/2014    Essential hypertension     GERD (gastroesophageal reflux disease) 7/25/2012    Gout, arthritis     Grade III open fracture of left tibia and fibula s/p ex-fix on 7/1/16 and ORIF of left tibia on 7/15 7/6/2016    H/O: iritis     Helicobacter pylori (H. pylori) infection     Treated    Hepatitis     Herpes simplex keratoconjunctivitis 9/30/2015    - on acyclovir - followed by opthalmology, Dr. Uribe     Herpes simplex keratoconjunctivitis 9/30/2015    - on acyclovir - followed by opthalmology, Dr. Uribe     Hyperkalemia 2/28/2017    Hyperlipidemia     Hypogonadism male     Hypogonadism male     Keloid cicatrix     Mixed anxiety and depressive disorder     Morbid obesity     Obstructive sleep apnea on CPAP     CPAP    Osteoarthritis of left knee 7/25/2012    Paroxysmal atrial fibrillation 7/6/2016    Primary osteoarthritis of left knee 7/25/2012    Prominent aorta 1/25/2016    "RESULTS: THE HEART IS MILDLY ENLARGED WITH A SLIGHTLY PROMINENT AORTA" - Xray Chest PA & Lateral 12-     Prostate cancer 2/15/2016    - followed by urology, Dr. Young     Prostate cancer 2/15/2016    - " followed by urology, Dr. 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    chest and forehead    Squamous cell cancer of skin of nose     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 7/6/2016    Type III open fracture of left tibia and fibula with routine healing 7/6/2016    Vitamin D deficiency disease     Vitreous detachment 3/18/2016       Past Surgical History:   Procedure Laterality Date    ADJACENT TISSUE TRANSFER Left 11/30/2020    Procedure: ADJACENT TISSUE TRANSFER x2 ;  Surgeon: Teresa Cooper MD;  Location: [facility] OR 1ST FLR;  Service: Ophthalmology;  Laterality: Left;  Left glabellar 8.5x11x3 and Left upper eyelid advancement flap [id number]      Cardiac stenting x2      CATARACT EXTRACTION W/  INTRAOCULAR LENS IMPLANT Right 3/29/2016    Dr. Conteh    CATARACT EXTRACTION W/  INTRAOCULAR LENS IMPLANT Left 4/12/2016        COLONOSCOPY N/A 7/23/2020    Procedure: COLONOSCOPY;  Surgeon: Nico Lackey MD;  Location: [facility] (2ND FLR);  Service: Endoscopy;  Laterality: N/A;    DIRECT LARYNGOSCOPY N/A 5/4/2022    Procedure: LARYNGOSCOPY, DIRECT possible biopsy;  Surgeon: Etta Higgins MD;  Location: [facility] OR;  Service: ENT;  Laterality: N/A;  RN PREOP ON 4/29/22. NEEDS T&S DAY OF SURGERY--NF  CONSENTS DAY OF SURGERY    DISSECTION OF NECK Right 5/4/2022    Procedure: RADICAL NECK DISSECTION;  Surgeon: Etta Higgins MD;  Location: [facility] OR;  Service: ENT;  Laterality: Right;  TO FOLLOW DR ALMAZAN    ESOPHAGOGASTRODUODENOSCOPY N/A 7/23/2020    Procedure: EGD (ESOPHAGOGASTRODUODENOSCOPY);  Surgeon: Nico Lackey MD;  Location: [facility] (2ND FLR);  Service: Endoscopy;  Laterality: N/A;  per Dr Lackey-Will proceed with EGD and  "colonoscopy on the 2nd floor due to his comorbidities including obesity, sleep apnea, restrictive lung disease, coronary artery disease.  has loop recorder      ok to hold Eliquis 2 days per Dr Sandhu-must remain    EXTERNAL FIXATION TIBIAL FRACTURE Left 07/01/2016    INSERTION OF IMPLANTABLE LOOP RECORDER  04/07/2017    LEFT HEART CATHETERIZATION Left 1/30/2020    Procedure: Left heart cath;  Surgeon: Benjamin Sandhu MD;  Location: [facility] CATH LAB;  Service: Cardiology;  Laterality: Left;  RN PREOP 1/28/20--Pt starting Plavix loading dose today (8pills)- Dr. Sandhu aware.  Pt has a bandaged "non healing area to LLE"--Dr. Sandhu aware.    LEFT HEART CATHETERIZATION Left 2/14/2020    Procedure: Left heart cath, IVUS guided left main / LAD PCI. Noon start, radial approach;  Surgeon: Miguel Angel Brady MD;  Location: [facility] CATH LAB;  Service: Cardiology;  Laterality: Left;  RN PRE OP 2-7-2020  BMI--45.61    ORIF TIBIA FRACTURE Left 07/15/2016    PROBING OF NASOLACRIMAL DUCT WITH INSERTION OF TUBE Left 11/30/2020    Procedure: PROBING, NASOLACRIMAL DUCT,;  Surgeon: Teresa Cooper MD;  Location: [facility] OR [address] [place] [address];  Service: Ophthalmology;  Laterality: Left;    RADIOACTIVE SEED IMPLANTATION OF PROSTATE N/A 8/8/2018    Procedure: INSERTION, RADIOACTIVE SEED, PROSTATE;  Surgeon: Bipin Thompson MD;  Location: [facility] OR [address] [place] [address];  Service: Urology;  Laterality: N/A;  1 hour    SKIN BIOPSY      Squamous cell cancer removal x3 with Mohs surgery      TONSILLECTOMY      TOTAL KNEE ARTHROPLASTY  10/2012    TRIGGER FINGER RELEASE Right 10/14/2020    Procedure: RELEASE, TRIGGER FINGER - right;  Surgeon: Rad Swift MD;  Location: [facility] OR;  Service: Orthopedics;  Laterality: Right;    trus/bx      ULTRASOUND GUIDANCE  2/14/2020    Procedure: Ultrasound Guidance;  Surgeon: Miguel Angel Brady MD;  Location: [facility] CATH LAB;  Service: Cardiology;;       Review of patient's allergies indicates:   Allergen Reactions    Ciprofloxacin Rash     Diffuse " pruritic morbilliform rash developed 3/15/2017 after dose of cipro; previously in 2/2017 he had rash/fevers after initiation of cipro    Zosyn [piperacillin-tazobactam] Rash     Diffuse pruritic morbilliform rash developed 3/15/2017.  Then, 430am dose on 3/16 and rash worsened with SOB/tachypnea but no hypoxemia.     Bacitracin Itching and Rash     Violaceous rash in area of topical Tx.        No current facility-administered medications on file prior to encounter.     Current Outpatient Medications on File Prior to Encounter   Medication Sig    acyclovir (ZOVIRAX) 800 MG Tab Take 1 tablet (800 mg total) by mouth 2 (two) times daily.    atorvastatin (LIPITOR) 40 MG tablet Take 1 tablet (40 mg total) by mouth once daily.    furosemide (LASIX) 20 MG tablet Take 1 tablet (20 mg total) by mouth once daily.    melatonin 5 mg Tab Take 10 mg by mouth nightly.    metoprolol succinate (TOPROL-XL) 25 MG 24 hr tablet Take 1 tablet by mouth once daily    olmesartan (BENICAR) 5 MG Tab Take 1 tablet (5 mg total) by mouth once daily.    oxybutynin (DITROPAN-XL) 5 MG TR24 Take 1 tablet (5 mg total) by mouth once daily.    oxyCODONE-acetaminophen (PERCOCET)  mg per tablet Take 1 tablet by mouth every 6 (six) hours as needed for Pain.    tamsulosin (FLOMAX) 0.4 mg Cap Take 1 capsule (0.4 mg total) by mouth once daily.    traZODone (DESYREL) 50 MG tablet Take 1 tablet (50 mg total) by mouth nightly as needed for Insomnia.    venlafaxine (EFFEXOR) 75 MG tablet Take 2 tablets (150 mg total) by mouth 2 (two) times daily.    aminophylline 250 mg/10 mL injection     apixaban (ELIQUIS) 5 mg Tab Take 1 tablet (5 mg total) by mouth 2 (two) times daily.    artificial tears (ISOPTO TEARS) 0.5 % ophthalmic solution Place 1 drop into both eyes as needed.    clopidogreL (PLAVIX) 75 mg tablet Take 1 tablet by mouth once daily    CYANOCOBALAMIN, VITAMIN B-12, (VITAMIN B-12 ORAL) Take 2,500 mcg by mouth once daily.    ELIQUIS 5 mg Tab Take 1  tablet by mouth twice daily    fluocinonide 0.05% (LIDEX) 0.05 % cream Apply topically 2 (two) times daily. Prn itch.  Stop using steroid topical when skin is smooth and non itchy.    fluticasone propionate (FLONASE) 50 mcg/actuation nasal spray 1 spray (50 mcg total) by Each Nostril route once daily.    gabapentin (NEURONTIN) 300 MG capsule Take 1 capsule (300 mg total) by mouth 3 (three) times daily.    gentamicin (GARAMYCIN) 0.1 % ointment     multivitamin (THERAGRAN) per tablet Take 1 tablet by mouth once daily.    mupirocin (BACTROBAN) 2 % ointment AAA lower leg bid after gentle cleansing    nitroGLYCERIN (NITROSTAT) 0.4 MG SL tablet Place 1 tablet (0.4 mg total) under the tongue every 5 (five) minutes as needed for Chest pain.    omeprazole (PRILOSEC) 20 MG capsule Take 1 capsule (20 mg total) by mouth daily as needed.    sildenafiL (VIAGRA) 100 MG tablet Take 1 tablet (100 mg total) by mouth daily as needed.    triamcinolone acetonide 0.1% (KENALOG) 0.1 % ointment AAA lower legs bid - occlude qhs    vitamin D 1000 units Tab Take 1 tablet (1,000 Units total) by mouth once daily. (Patient taking differently: Take 2,000 Units by mouth once daily.)     Family History       Problem Relation (Age of Onset)    Alzheimer's disease Mother    Cancer Father, Sister, Brother    Hypertension Mother    Lung cancer Father    No Known Problems Sister, Maternal Aunt, Maternal Uncle, Paternal Aunt, Paternal Uncle, Maternal Grandmother, Maternal Grandfather, Paternal Grandmother, Paternal Grandfather    Peripheral vascular disease     Skin cancer Father          Tobacco Use    Smoking status: Never Smoker    Smokeless tobacco: Never Used   Substance and Sexual Activity    Alcohol use: Yes     Alcohol/week: 2.0 standard drinks     Types: 2 Cans of beer per week     Comment: occasionally, beer    Drug use: Never    Sexual activity: Yes     Partners: Female     Birth control/protection: None     Review of Systems   Constitutional:   Negative for appetite change, chills and fever.   Respiratory:  Positive for cough and shortness of breath. Negative for chest tightness and wheezing.    Cardiovascular:  Negative for chest pain, palpitations and leg swelling.   Gastrointestinal:  Positive for constipation. Negative for abdominal pain, diarrhea, nausea and vomiting.   Genitourinary:  Negative for difficulty urinating.   Musculoskeletal:  Positive for neck pain. Negative for arthralgias.   Skin:  Positive for wound.   Neurological:  Negative for weakness and numbness.   Psychiatric/Behavioral:  Negative for confusion.    Objective:     Vital Signs (Most Recent):  Temp: 97.9 °F (36.6 °C) (05/06/22 1149)  Pulse: 70 (05/06/22 1149)  Resp: 20 (05/06/22 1149)  BP: (!) 152/65 (05/06/22 1149)  SpO2: 98 % (05/06/22 1149)   Vital Signs (24h Range):  Temp:  [97.7 °F (36.5 °C)-98.4 °F (36.9 °C)] 97.9 °F (36.6 °C)  Pulse:  [66-89] 70  Resp:  [16-22] 20  SpO2:  [96 %-100 %] 98 %  BP: (138-162)/(62-75) 152/65     Weight: (!) 158.8 kg (350 lb)  Body mass index is 46.18 kg/m².    Physical Exam  Vitals and nursing note reviewed.   Constitutional:       General: He is not in acute distress.     Appearance: He is obese. He is not toxic-appearing.   HENT:      Head: Normocephalic and atraumatic.      Nose: Nose normal.      Mouth/Throat:      Mouth: Mucous membranes are moist.   Neck:      Comments: Staples along surgical incision on R neck intact, clean and dry. Drain in place with serosanguinous output  Cardiovascular:      Rate and Rhythm: Normal rate and regular rhythm.      Pulses: Normal pulses.      Heart sounds: Normal heart sounds. No murmur heard.    No gallop.   Pulmonary:      Effort: Pulmonary effort is normal. No respiratory distress.      Breath sounds: No wheezing or rales.      Comments: Diminished inspiratory effort. Weaned from 4L NC to 1L NC with SpO2 staying >92% the entire time  Abdominal:      General: Bowel sounds are normal. There is  distension.      Palpations: Abdomen is soft. There is no mass.      Tenderness: There is no abdominal tenderness. There is no guarding.   Musculoskeletal:      Right lower leg: No edema.      Left lower leg: No edema.   Skin:     General: Skin is warm and dry.   Neurological:      Mental Status: He is alert and oriented to person, place, and time.       Significant Labs: All pertinent labs within the past 24 hours have been reviewed.    Significant Imaging: I have reviewed all pertinent imaging results/findings within the past 24 hours.

## 2022-05-06 NOTE — NURSING
Patient had an episode of dyspnea on exertion while changing position from sitting on edge of bed and back into bed. Patient daughter reports that he was sitting at edge of bed eating breakfast and upon completion of breakfast the patient wanted to lay back in bed. Daughter reports he used arms and legs to pull himself back but became short of breath.    RN called and in room.  O2 sat on monitor 77%. Increased O2 from 3L to 5 L nasal cannula.Checked with portable pulse ox in room and O2 increased to 92%. Called RT to administer a prn neb treatment. Assessed patient neck for any signs of bleeding or hematoma near incision and assessed drain; both intact.     Dr. Higgins notified of patient episode.

## 2022-05-06 NOTE — HPI
Mr Janusz Montgomery Jr. is a 77 y.o. man with CAD, HFpEF, CHARISMA on APAP, asbestosis, and head/neck SCC admitted to ENT. He is s/p RADICAL NECK DISSECTION (Right) Level 2-5 with sacrifice of portion of SCM, internal jugular vein and accessory nerve due to tumor invasion on 5/4/2022. Hospital medicine consulted for shortness of breath.     Patient states he has had intermittent shortness of breath and dyspnea on exertion with lower extremity edema for 2 years. He has a history of CAD s/p stenting and diastolic CHF with last TTE 2022 EF 60% on home lasix. 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 He has PFTs from 2020 with moderate restriction. He has CHARISMA on APAP 14-16 at home. He states he was previously tested for home oxygen and was supposed to receive it but did not. He has had a couple episodes of shortness of breath/dyspnea on exertion since admission after surgery. He has a cough productive of sputum. No fever. No chest pain. No leg swelling.

## 2022-05-06 NOTE — ANESTHESIA POSTPROCEDURE EVALUATION
Anesthesia Post Evaluation    Patient: Janusz Montgomery Jr.    Procedure(s) Performed: Procedure(s) (LRB):  RADICAL NECK DISSECTION (Right)  LARYNGOSCOPY, DIRECT possible biopsy (N/A)    Final Anesthesia Type: general      Patient location during evaluation: PACU  Patient participation: Yes- Able to Participate  Level of consciousness: awake and alert and oriented  Post-procedure vital signs: reviewed and stable  Pain management: adequate  Airway patency: patent    PONV status at discharge: No PONV  Anesthetic complications: no      Cardiovascular status: blood pressure returned to baseline, hemodynamically stable and stable  Respiratory status: unassisted, spontaneous ventilation and room air  Hydration status: euvolemic  Follow-up not needed.          Vitals Value Taken Time   /65 05/06/22 0520   Temp 36.9 °C (98.4 °F) 05/06/22 0520   Pulse 72 05/06/22 0520   Resp 16 05/06/22 0520   SpO2 99 % 05/06/22 0520         Event Time   Out of Recovery 17:44:23         Pain/Serjio Score: Pain Rating Prior to Med Admin: 4 (5/5/2022  9:13 AM)

## 2022-05-06 NOTE — PROGRESS NOTES
[facility] Surg  Otorhinolaryngology-Head & Neck Surgery  Progress Note    Patient Name: Janusz Montgomery Jr.  MRN: [id number]  Code Status: Prior  Admission Date: 5/4/2022  Hospital Length of Stay: 2 days  Attending Physician: Etta Higgins MD  Primary Care Provider: Miguelina Weathers MD    Subjective:     Interval History: had more sob overnight and this am. Pt seen this am and afternoon  Pain controlled    Post-Op Info:  Procedure(s) (LRB):  RADICAL NECK DISSECTION (Right)  LARYNGOSCOPY, DIRECT possible biopsy (N/A)   2 Days Post-Op  Hospital Day: 3      Medications:  Continuous Infusions:  Scheduled Meds:   acyclovir  800 mg Oral BID    apixaban  5 mg Oral BID    clopidogreL  75 mg Oral Daily    furosemide  20 mg Oral Daily    gabapentin  300 mg Oral TID    losartan  12.5 mg Oral Daily    melatonin  9 mg Oral Nightly    metoprolol succinate  25 mg Oral Daily    oxybutynin  5 mg Oral Daily    tamsulosin  1 capsule Oral Daily    venlafaxine  150 mg Oral BID     PRN Meds:albuterol-ipratropium, artificial tears, morphine, ondansetron, oxyCODONE-acetaminophen, sodium chloride 0.9%, sodium chloride 0.9%, traZODone     Objective:     Vital Signs (24h Range):  Temp:  [97.7 °F (36.5 °C)-98.4 °F (36.9 °C)] 97.9 °F (36.6 °C)  Pulse:  [66-89] 70  Resp:  [16-22] 20  SpO2:  [96 %-100 %] 98 %  BP: (109-162)/(59-75) 152/65     Date 05/06/22 0700 - 05/07/22 0659   Shift 7525-6176 3807-3240 8596-1368 24 Hour Total   INTAKE   Shift Total(mL/kg)       OUTPUT   Urine(mL/kg/hr) 1025(0.8)   1025   Drains 30   30   Shift Total(mL/kg) 1055(6.6)   1055(6.6)   Weight (kg) 158.8 158.8 158.8 158.8     Lines/Drains/Airways     Drain  Duration                Closed/Suction Drain 05/04/22 1535 Right Neck Bulb 10 Fr. 2 days          Peripheral Intravenous Line  Duration                Peripheral IV - Single Lumen 05/04/22 0810 20 G Left;Posterior Forearm 2 days                Physical Exam  Vitals reviewed.   HENT:      Head:  Normocephalic.   Neck:      Comments: Incision c/d/i with staples  erika drain serosang   Neurological:      Mental Status: He is alert.             Assessment/Plan:     Active Diagnoses:    Diagnosis Date Noted POA    PRINCIPAL PROBLEM:  Head and neck cancer [C76.0] 05/05/2022 Unknown      Problems Resolved During this Admission:     Medicine consult for sob and desats   Incentive spirometer  ambulate  Wean o2 as tolerated  Prn duonebs  erika drain monitor  Restart anticoags  Needs continued inpatient care due to respiratory issues    Etta Higgins MD  Otorhinolaryngology-Head & Neck Surgery  [facility]

## 2022-05-06 NOTE — ASSESSMENT & PLAN NOTE
Patient with Paroxysmal (<7 days) atrial fibrillation which is controlled currently with Beta Blocker. Patient is currently in sinus rhythm.[id number] Score: 3. Anticoagulation indicated. Anticoagulation done with eliquis.

## 2022-05-06 NOTE — CONSULTS
[facility] Medicine  Consult Note    Patient Name: Janusz Montgomery Jr.  MRN: [id number]  Admission Date: 5/4/2022  Hospital Length of Stay: 2 days  Attending Physician: Etta Higgins MD   Primary Care Provider: Miguelina Weathers MD           Patient information was obtained from patient, past medical records and ER records.     Consults  Subjective:     Principal Problem: Head and neck cancer    Chief Complaint: shortness of breath      HPI: Mr Janusz Montgomery Jr. is a 77 y.o. man with CAD, HFpEF, CHARISMA on APAP, asbestosis, and head/neck SCC admitted to ENT. He is s/p RADICAL NECK DISSECTION (Right) Level 2-5 with sacrifice of portion of SCM, internal jugular vein and accessory nerve due to tumor invasion on 5/4/2022. Hospital medicine consulted for shortness of breath.     Patient states he has had intermittent shortness of breath and dyspnea on exertion with lower extremity edema for 2 years. He has a history of CAD s/p stenting and diastolic CHF with last TTE 2022 EF 60% on home lasix. 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 He has PFTs from 2020 with moderate restriction. He has CHARISMA on APAP 14-16 at home. He states he was previously tested for home oxygen and was supposed to receive it but did not. He has had a couple episodes of shortness of breath/dyspnea on exertion since admission after surgery. He has a cough productive of sputum. No fever. No chest pain. No leg swelling.        Past Medical History:   Diagnosis Date    (HFpEF) heart failure with preserved ejection fraction 2/4/2022    NAT (acute kidney injury) 3/19/2017    ALLERGIC RHINITIS     Anemia     Anxiety     Basal cell carcinoma 10/19/2018    forehead and right medial shoulder    Basal cell carcinoma 01/09/2019    left nasal bridge and left posterior ear    Basal cell carcinoma 06/12/2020    left lower medial leg - Efudex    Basal  "cell carcinoma 11/30/2020    left medial upper eyelid canthus superficial BCC    Chronic rhinitis 5/3/2013    Chronic rhinitis 5/3/2013    Coronary artery disease involving native coronary artery of native heart without angina pectoris s/p RCA stent     Cortical cataract of both eyes 3/18/2016    Delayed sleep phase syndrome 3/13/2019    Depression     Erectile dysfunction 3/24/2014    Erectile dysfunction 3/24/2014    Essential hypertension     GERD (gastroesophageal reflux disease) 7/25/2012    Gout, arthritis     Grade III open fracture of left tibia and fibula s/p ex-fix on 7/1/16 and ORIF of left tibia on 7/15 7/6/2016    H/O: iritis     Helicobacter pylori (H. pylori) infection     Treated    Hepatitis     Herpes simplex keratoconjunctivitis 9/30/2015    - on acyclovir - followed by opthalmology, Dr. Uribe     Herpes simplex keratoconjunctivitis 9/30/2015    - on acyclovir - followed by opthalmology, Dr. Uribe     Hyperkalemia 2/28/2017    Hyperlipidemia     Hypogonadism male     Hypogonadism male     Keloid cicatrix     Mixed anxiety and depressive disorder     Morbid obesity     Obstructive sleep apnea on CPAP     CPAP    Osteoarthritis of left knee 7/25/2012    Paroxysmal atrial fibrillation 7/6/2016    Primary osteoarthritis of left knee 7/25/2012    Prominent aorta 1/25/2016    "RESULTS: THE HEART IS MILDLY ENLARGED WITH A SLIGHTLY PROMINENT AORTA" - Xray Chest PA & Lateral 12-     Prostate cancer 2/15/2016    - followed by urology, Dr. Young     Prostate cancer 2/15/2016    - followed by urology, Dr. 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    chest and forehead    Squamous cell cancer of skin of nose     Traumatic type III " open fracture of shaft of left tibia and fibula with nonunion 7/6/2016    Type III open fracture of left tibia and fibula with routine healing 7/6/2016    Vitamin D deficiency disease     Vitreous detachment 3/18/2016       Past Surgical History:   Procedure Laterality Date    ADJACENT TISSUE TRANSFER Left 11/30/2020    Procedure: ADJACENT TISSUE TRANSFER x2 ;  Surgeon: Teresa Cooper MD;  Location: [facility] 1ST FLR;  Service: Ophthalmology;  Laterality: Left;  Left glabellar 8.5x11x3 and Left upper eyelid advancement flap [id number]      Cardiac stenting x2      CATARACT EXTRACTION W/  INTRAOCULAR LENS IMPLANT Right 3/29/2016    Dr. Conteh    CATARACT EXTRACTION W/  INTRAOCULAR LENS IMPLANT Left 4/12/2016        COLONOSCOPY N/A 7/23/2020    Procedure: COLONOSCOPY;  Surgeon: Nico Lackey MD;  Location: [facility] (2ND FLR);  Service: Endoscopy;  Laterality: N/A;    DIRECT LARYNGOSCOPY N/A 5/4/2022    Procedure: LARYNGOSCOPY, DIRECT possible biopsy;  Surgeon: Etta Higgins MD;  Location: [facility] OR;  Service: ENT;  Laterality: N/A;  RN PREOP ON 4/29/22. NEEDS T&S DAY OF SURGERY--NF  CONSENTS DAY OF SURGERY    DISSECTION OF NECK Right 5/4/2022    Procedure: RADICAL NECK DISSECTION;  Surgeon: Etta Higgins MD;  Location: [facility] OR;  Service: ENT;  Laterality: Right;  TO FOLLOW DR ALMAZAN    ESOPHAGOGASTRODUODENOSCOPY N/A 7/23/2020    Procedure: EGD (ESOPHAGOGASTRODUODENOSCOPY);  Surgeon: Nico Lackey MD;  Location: [facility] (2ND FLR);  Service: Endoscopy;  Laterality: N/A;  per Dr Lackey-Will proceed with EGD and colonoscopy on the 2nd floor due to his comorbidities including obesity, sleep apnea, restrictive lung disease, coronary artery disease.  has loop recorder      ok to hold Eliquis 2 days per Dr Sandhu-must remain    EXTERNAL FIXATION TIBIAL FRACTURE Left 07/01/2016    INSERTION OF IMPLANTABLE LOOP RECORDER  04/07/2017    LEFT HEART CATHETERIZATION Left 1/30/2020     "Procedure: Left heart cath;  Surgeon: Benjamin Sandhu MD;  Location: [facility] CATH LAB;  Service: Cardiology;  Laterality: Left;  RN PREOP 1/28/20--Pt starting Plavix loading dose today (8pills)- Dr. Sandhu aware.  Pt has a bandaged "non healing area to LLE"--Dr. Sandhu aware.    LEFT HEART CATHETERIZATION Left 2/14/2020    Procedure: Left heart cath, IVUS guided left main / LAD PCI. Noon start, radial approach;  Surgeon: Miguel Angel Brady MD;  Location: [facility] CATH LAB;  Service: Cardiology;  Laterality: Left;  RN PRE OP 2-7-2020  BMI--45.61    ORIF TIBIA FRACTURE Left 07/15/2016    PROBING OF NASOLACRIMAL DUCT WITH INSERTION OF TUBE Left 11/30/2020    Procedure: PROBING, NASOLACRIMAL DUCT,;  Surgeon: Teresa Cooper MD;  Location: [address];  Service: Ophthalmology;  Laterality: Left;    RADIOACTIVE SEED IMPLANTATION OF PROSTATE N/A 8/8/2018    Procedure: INSERTION, RADIOACTIVE SEED, PROSTATE;  Surgeon: Bipin Thompson MD;  Location: [facility] OR [address] [place] [address];  Service: Urology;  Laterality: N/A;  1 hour    SKIN BIOPSY      Squamous cell cancer removal x3 with Mohs surgery      TONSILLECTOMY      TOTAL KNEE ARTHROPLASTY  10/2012    TRIGGER FINGER RELEASE Right 10/14/2020    Procedure: RELEASE, TRIGGER FINGER - right;  Surgeon: Rad Swift MD;  Location: [facility];  Service: Orthopedics;  Laterality: Right;    trus/bx      ULTRASOUND GUIDANCE  2/14/2020    Procedure: Ultrasound Guidance;  Surgeon: Miguel Angel Brady MD;  Location: [facility] CATH LAB;  Service: Cardiology;;       Review of patient's allergies indicates:   Allergen Reactions    Ciprofloxacin Rash     Diffuse pruritic morbilliform rash developed 3/15/2017 after dose of cipro; previously in 2/2017 he had rash/fevers after initiation of cipro    Zosyn [piperacillin-tazobactam] Rash     Diffuse pruritic morbilliform rash developed 3/15/2017.  Then, 430am dose on 3/16 and rash worsened with SOB/tachypnea but no hypoxemia.     Bacitracin Itching and Rash     " Violaceous rash in area of topical Tx.        No current facility-administered medications on file prior to encounter.     Current Outpatient Medications on File Prior to Encounter   Medication Sig    acyclovir (ZOVIRAX) 800 MG Tab Take 1 tablet (800 mg total) by mouth 2 (two) times daily.    atorvastatin (LIPITOR) 40 MG tablet Take 1 tablet (40 mg total) by mouth once daily.    furosemide (LASIX) 20 MG tablet Take 1 tablet (20 mg total) by mouth once daily.    melatonin 5 mg Tab Take 10 mg by mouth nightly.    metoprolol succinate (TOPROL-XL) 25 MG 24 hr tablet Take 1 tablet by mouth once daily    olmesartan (BENICAR) 5 MG Tab Take 1 tablet (5 mg total) by mouth once daily.    oxybutynin (DITROPAN-XL) 5 MG TR24 Take 1 tablet (5 mg total) by mouth once daily.    oxyCODONE-acetaminophen (PERCOCET)  mg per tablet Take 1 tablet by mouth every 6 (six) hours as needed for Pain.    tamsulosin (FLOMAX) 0.4 mg Cap Take 1 capsule (0.4 mg total) by mouth once daily.    traZODone (DESYREL) 50 MG tablet Take 1 tablet (50 mg total) by mouth nightly as needed for Insomnia.    venlafaxine (EFFEXOR) 75 MG tablet Take 2 tablets (150 mg total) by mouth 2 (two) times daily.    aminophylline 250 mg/10 mL injection     apixaban (ELIQUIS) 5 mg Tab Take 1 tablet (5 mg total) by mouth 2 (two) times daily.    artificial tears (ISOPTO TEARS) 0.5 % ophthalmic solution Place 1 drop into both eyes as needed.    clopidogreL (PLAVIX) 75 mg tablet Take 1 tablet by mouth once daily    CYANOCOBALAMIN, VITAMIN B-12, (VITAMIN B-12 ORAL) Take 2,500 mcg by mouth once daily.    ELIQUIS 5 mg Tab Take 1 tablet by mouth twice daily    fluocinonide 0.05% (LIDEX) 0.05 % cream Apply topically 2 (two) times daily. Prn itch.  Stop using steroid topical when skin is smooth and non itchy.    fluticasone propionate (FLONASE) 50 mcg/actuation nasal spray 1 spray (50 mcg total) by Each Nostril route once daily.    gabapentin (NEURONTIN) 300  MG capsule Take 1 capsule (300 mg total) by mouth 3 (three) times daily.    gentamicin (GARAMYCIN) 0.1 % ointment     multivitamin (THERAGRAN) per tablet Take 1 tablet by mouth once daily.    mupirocin (BACTROBAN) 2 % ointment AAA lower leg bid after gentle cleansing    nitroGLYCERIN (NITROSTAT) 0.4 MG SL tablet Place 1 tablet (0.4 mg total) under the tongue every 5 (five) minutes as needed for Chest pain.    omeprazole (PRILOSEC) 20 MG capsule Take 1 capsule (20 mg total) by mouth daily as needed.    sildenafiL (VIAGRA) 100 MG tablet Take 1 tablet (100 mg total) by mouth daily as needed.    triamcinolone acetonide 0.1% (KENALOG) 0.1 % ointment AAA lower legs bid - occlude qhs    vitamin D 1000 units Tab Take 1 tablet (1,000 Units total) by mouth once daily. (Patient taking differently: Take 2,000 Units by mouth once daily.)     Family History       Problem Relation (Age of Onset)    Alzheimer's disease Mother    Cancer Father, Sister, Brother    Hypertension Mother    Lung cancer Father    No Known Problems Sister, Maternal Aunt, Maternal Uncle, Paternal Aunt, Paternal Uncle, Maternal Grandmother, Maternal Grandfather, Paternal Grandmother, Paternal Grandfather    Peripheral vascular disease     Skin cancer Father          Tobacco Use    Smoking status: Never Smoker    Smokeless tobacco: Never Used   Substance and Sexual Activity    Alcohol use: Yes     Alcohol/week: 2.0 standard drinks     Types: 2 Cans of beer per week     Comment: occasionally, beer    Drug use: Never    Sexual activity: Yes     Partners: Female     Birth control/protection: None     Review of Systems   Constitutional:  Negative for appetite change, chills and fever.   Respiratory:  Positive for cough and shortness of breath. Negative for chest tightness and wheezing.    Cardiovascular:  Negative for chest pain, palpitations and leg swelling.   Gastrointestinal:  Positive for constipation. Negative for abdominal pain, diarrhea,  nausea and vomiting.   Genitourinary:  Negative for difficulty urinating.   Musculoskeletal:  Positive for neck pain. Negative for arthralgias.   Skin:  Positive for wound.   Neurological:  Negative for weakness and numbness.   Psychiatric/Behavioral:  Negative for confusion.    Objective:     Vital Signs (Most Recent):  Temp: 97.9 °F (36.6 °C) (05/06/22 1149)  Pulse: 70 (05/06/22 1149)  Resp: 20 (05/06/22 1149)  BP: (!) 152/65 (05/06/22 1149)  SpO2: 98 % (05/06/22 1149)   Vital Signs (24h Range):  Temp:  [97.7 °F (36.5 °C)-98.4 °F (36.9 °C)] 97.9 °F (36.6 °C)  Pulse:  [66-89] 70  Resp:  [16-22] 20  SpO2:  [96 %-100 %] 98 %  BP: (138-162)/(62-75) 152/65     Weight: (!) 158.8 kg (350 lb)  Body mass index is 46.18 kg/m².    Physical Exam  Vitals and nursing note reviewed.   Constitutional:       General: He is not in acute distress.     Appearance: He is obese. He is not toxic-appearing.   HENT:      Head: Normocephalic and atraumatic.      Nose: Nose normal.      Mouth/Throat:      Mouth: Mucous membranes are moist.   Neck:      Comments: Staples along surgical incision on R neck intact, clean and dry. Drain in place with serosanguinous output  Cardiovascular:      Rate and Rhythm: Normal rate and regular rhythm.      Pulses: Normal pulses.      Heart sounds: Normal heart sounds. No murmur heard.    No gallop.   Pulmonary:      Effort: Pulmonary effort is normal. No respiratory distress.      Breath sounds: No wheezing or rales.      Comments: Diminished inspiratory effort. Weaned from 4L NC to 1L NC with SpO2 staying >92% the entire time  Abdominal:      General: Bowel sounds are normal. There is distension.      Palpations: Abdomen is soft. There is no mass.      Tenderness: There is no abdominal tenderness. There is no guarding.   Musculoskeletal:      Right lower leg: No edema.      Left lower leg: No edema.   Skin:     General: Skin is warm and dry.   Neurological:      Mental Status: He is alert and oriented to  person, place, and time.       Significant Labs: All pertinent labs within the past 24 hours have been reviewed.    Significant Imaging: I have reviewed all pertinent imaging results/findings within the past 24 hours.    Assessment/Plan:     * Head and neck cancer  S/p dissection - see op note  Management per ENT      Hypoxia  Long standing dyspnea on exertion due to HFpEF, obesity, PHTN, CHARISMA, asbestosis  - PFTs 2020 with restrictive physiology- likely due to habitus  - TTE with EF 60% on last check 2022. He has had diastolic dysfunction in past. Does not appear volume overloaded currently  - reports he was ordered home O2 but it did not arrive    Continue home PO lasix  Incentive spirometer  Home APAP (at bedside)  Test for home O2- nursing aware, communication placed. Will order after test completed.   Pulmonary follow up on discharge       Constipation  Bowel regimen ordered- no BM since surgery      Chronic heart failure with preserved ejection fraction  No signs of acute exacerbation at this time. No edema.   BNP  Recent Labs   Lab 05/06/22  1311   BNP 46     Last TTE 4/2022 with EF 60%  Continue home lasix      Macrocytic anemia  Noted.   CBC in AM      Asbestosis  CXR with pleural plaques. No effusions noted.   PFTs 2020 with restrictive lung disease. Per Pulmonary, restrictive physiology is more likely due to habitus      CKD (chronic kidney disease) stage 3, GFR 30-59 ml/min  Cr 1.5, at baseline      Paroxysmal atrial fibrillation   Patient with Paroxysmal (<7 days) atrial fibrillation which is controlled currently with Beta Blocker. Patient is currently in sinus rhythm.[id number] Score: 3. Anticoagulation indicated. Anticoagulation done with eliquis.        Morbid obesity with body mass index of 45.0-49.9 in adult  Body mass index is 46.18 kg/m². Morbid obesity complicates all aspects of disease management from diagnostic modalities to treatment. Weight loss encouraged and health benefits explained to  patient.         CHARISMA on CPAP  Follows with Pulmonary  On APAP 14-16 at home  Currently respiratory is adding O2 to this- may need to do this on discharge  Morbid obesity contributes to CHARISMA      Coronary artery disease involving native coronary artery of native heart without angina pectoris s/p RCA stent  Noted. No angina. Continue home Rx        VTE Risk Mitigation (From admission, onward)         Ordered     apixaban tablet 5 mg  2 times daily         05/06/22 8959                    Thank you for your consult. I will follow-up with patient. Please contact us if you have any additional questions.    Kari Villarreal MD  Department of Hospital Medicine   [facility] [place] [facility]

## 2022-05-07 PROBLEM — R09.02 HYPOXIA: Status: RESOLVED | Noted: 2022-01-01 | Resolved: 2022-01-01

## 2022-05-07 PROBLEM — J98.4 RESTRICTIVE LUNG DISEASE: Status: ACTIVE | Noted: 2022-01-01

## 2022-05-07 PROBLEM — J96.11 CHRONIC RESPIRATORY FAILURE WITH HYPOXIA: Status: ACTIVE | Noted: 2022-01-01

## 2022-05-07 NOTE — PLAN OF CARE
Order placed for home nebulizer; [facility]; [facility] review       05/07/22 1044   Post-Acute Status   Post-Acute Authorization HME  (Home Nebulizer)   [facility] Status Referrals Sent   Coverage [facility] [place]   Discharge Delays None known at this time   Discharge Plan   Discharge Plan A Home

## 2022-05-07 NOTE — PROGRESS NOTES
[facility] Medicine  Progress Note    Patient Name: Janusz Montgomery Jr.  MRN: [id number]  Patient Class: IP- Inpatient   Admission Date: 5/4/2022  Length of Stay: 3 days  Attending Physician: Etta Higgins MD  Primary Care Provider: Miguelina Weathers MD        Subjective:     Principal Problem:Head and neck cancer        HPI:  Mr Janusz Montgomery Jr. is a 77 y.o. man with CAD, HFpEF, CHARISMA on APAP, asbestosis, and head/neck SCC admitted to ENT. He is s/p RADICAL NECK DISSECTION (Right) Level 2-5 with sacrifice of portion of SCM, internal jugular vein and accessory nerve due to tumor invasion on 5/4/2022. Hospital medicine consulted for shortness of breath.     Patient states he has had intermittent shortness of breath and dyspnea on exertion with lower extremity edema for 2 years. He has a history of CAD s/p stenting and diastolic CHF with last TTE 2022 EF 60% on home lasix. 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 He has PFTs from 2020 with moderate restriction. He has CHARISMA on APAP 14-16 at home. He states he was previously tested for home oxygen and was supposed to receive it but did not. He has had a couple episodes of shortness of breath/dyspnea on exertion since admission after surgery. He has a cough productive of sputum. No fever. No chest pain. No leg swelling.        Overview/Hospital Course:  No notes on file    Interval History: Feeling short of breath with movement. This happens at home too. At home he is short of breath even with just showering. It is worse here in the hospital than at home. No fever, cough, swelling.     Review of Systems   Constitutional:  Negative for chills and fever.   Respiratory:  Positive for shortness of breath. Negative for cough and wheezing.    Cardiovascular:  Negative for chest pain, palpitations and leg swelling.   Gastrointestinal:  Positive for abdominal distention.  Negative for abdominal pain, diarrhea, nausea and vomiting.   Genitourinary:  Negative for difficulty urinating.   Neurological:  Negative for weakness and numbness.   Psychiatric/Behavioral:  Negative for confusion.    Objective:     Vital Signs (Most Recent):  Temp: 97.5 °F (36.4 °C) (05/07/22 1142)  Pulse: 75 (05/07/22 1142)  Resp: 20 (05/07/22 1142)  BP: (!) 102/50 (05/07/22 1142)  SpO2: 98 % (05/07/22 1142)   Vital Signs (24h Range):  Temp:  [97.4 °F (36.3 °C)-97.8 °F (36.6 °C)] 97.5 °F (36.4 °C)  Pulse:  [62-85] 75  Resp:  [14-22] 20  SpO2:  [94 %-98 %] 98 %  BP: (102-150)/(49-65) 102/50     Weight: (!) 158.8 kg (350 lb)  Body mass index is 46.18 kg/m².    Intake/Output Summary (Last 24 hours) at 5/7/2022 1325  Last data filed at 5/7/2022 0825  Gross per 24 hour   Intake 720 ml   Output 1320 ml   Net -600 ml      Physical Exam  Vitals and nursing note reviewed.   Constitutional:       General: He is not in acute distress.     Appearance: He is obese. He is not toxic-appearing.   HENT:      Head: Normocephalic and atraumatic.      Nose: Nose normal.      Mouth/Throat:      Mouth: Mucous membranes are moist.   Neck:      Comments: Neck incision staples and drain in place  Cardiovascular:      Rate and Rhythm: Normal rate and regular rhythm.      Pulses: Normal pulses.      Heart sounds: Normal heart sounds. No murmur heard.    No gallop.   Pulmonary:      Effort: No respiratory distress.      Breath sounds: No wheezing or rales.      Comments: Increased effort. 4L NC  Abdominal:      General: Bowel sounds are normal. There is distension.      Palpations: Abdomen is soft.      Tenderness: There is no abdominal tenderness. There is no guarding.   Musculoskeletal:      Right lower leg: No edema.      Left lower leg: No edema.   Skin:     General: Skin is warm and dry.   Neurological:      Mental Status: He is alert and oriented to person, place, and time.       Significant Labs: All pertinent labs within the past  "24 hours have been reviewed.    Significant Imaging: I have reviewed all pertinent imaging results/findings within the past 24 hours.      Assessment/Plan:      * Head and neck cancer  S/p dissection - see op note  Management per ENT      Restrictive lung disease  Noted on prior PFTs 2020  See resp failure       Constipation  Bowel regimen ordered    Chronic respiratory failure with hypoxia  Patient admitted with Hypoxic which is Chronic.  he is not on home oxygen because it was never delivered. Signs/symptoms of respiratory failure include- increased work of breathing present on admission and still present with ambulation  - Labs and images were reviewed.  - Supplemental oxygen was provided and noted- Nasal Cannula 4 LPM and APAP QHS  - Respiratory failure is due to- CHF, Obesity Hypoventilation and restrictive lung disease, morbid obesity, pulmonary hypertension  - O2 ordered for home  - already has APAP  - neb ordered for home  - will trial 40mg IV lasix x1 today to see if that improves his symptoms  - Pulmonary follow up on discharge       Chronic heart failure with preserved ejection fraction  No edema.   BNP may be underestimated due to morbid obesity   Recent Labs   Lab 05/06/22  1311   BNP 46     Last TTE 4/2022 with EF 60%  Continue home lasix  Will dose 40mg IV lasix x1 today to see if he has improvement in respiratory symptoms       Macrocytic anemia  Noted. Stable post op.       Asbestosis  PFTs 2020 with restrictive lung disease. Last CT 2/2022 showing "Symmetric coarse calcified pleural plaques of the bilateral hanh thoraces sparing the mediastinal pleura associated with loculated pleural effusion, subpleural fibrosis and volume loss in the left lower lobe, not significantly changed and most compatible with asbestosis."  - may need repeat PFTs as outpatient   - follow up with Pulmonary  - Per last Pulmonary note, restrictive physiology is more likely due to habitus      CKD (chronic kidney disease) " stage 3, GFR 30-59 ml/min  Cr 1.5, at baseline      Paroxysmal atrial fibrillation   Patient with Paroxysmal (<7 days) atrial fibrillation which is controlled currently with Beta Blocker. Patient is currently in sinus rhythm.[id number] Score: 3. Anticoagulation indicated. Anticoagulation done with eliquis.        Morbid obesity with body mass index of 45.0-49.9 in adult  Body mass index is 46.18 kg/m². Morbid obesity complicates all aspects of disease management from diagnostic modalities to treatment. Weight loss encouraged and health benefits explained to patient.         CHARISMA on CPAP  Follows with Pulmonary  On APAP 14-16 at home  Currently respiratory is adding O2 to this- will need to do this on discharge  Morbid obesity contributes to CHARISMA      Coronary artery disease involving native coronary artery of native heart without angina pectoris s/p RCA stent  Noted. No angina. Continue home Rx        VTE Risk Mitigation (From admission, onward)         Ordered     apixaban tablet 5 mg  2 times daily         05/06/22 0950                Discharge Planning   KIARA: 5/7/2022     Code Status: Full Code   Is the patient medically ready for discharge?:     Reason for patient still in hospital (select all that apply): Patient trending condition  Discharge Plan A: Home   Discharge Delays: None known at this time    I will continue to follow. Recommend monitor in hospital for 1 more night given degree of hypoxia when walking.       Kari Villarreal MD  Department of Hospital Medicine   [facility]

## 2022-05-07 NOTE — SUBJECTIVE & OBJECTIVE
Interval History: Feeling short of breath with movement. This happens at home too. At home he is short of breath even with just showering. It is worse here in the hospital than at home. No fever, cough, swelling.     Review of Systems   Constitutional:  Negative for chills and fever.   Respiratory:  Positive for shortness of breath. Negative for cough and wheezing.    Cardiovascular:  Negative for chest pain, palpitations and leg swelling.   Gastrointestinal:  Positive for abdominal distention. Negative for abdominal pain, diarrhea, nausea and vomiting.   Genitourinary:  Negative for difficulty urinating.   Neurological:  Negative for weakness and numbness.   Psychiatric/Behavioral:  Negative for confusion.    Objective:     Vital Signs (Most Recent):  Temp: 97.5 °F (36.4 °C) (05/07/22 1142)  Pulse: 75 (05/07/22 1142)  Resp: 20 (05/07/22 1142)  BP: (!) 102/50 (05/07/22 1142)  SpO2: 98 % (05/07/22 1142)   Vital Signs (24h Range):  Temp:  [97.4 °F (36.3 °C)-97.8 °F (36.6 °C)] 97.5 °F (36.4 °C)  Pulse:  [62-85] 75  Resp:  [14-22] 20  SpO2:  [94 %-98 %] 98 %  BP: (102-150)/(49-65) 102/50     Weight: (!) 158.8 kg (350 lb)  Body mass index is 46.18 kg/m².    Intake/Output Summary (Last 24 hours) at 5/7/2022 1325  Last data filed at 5/7/2022 0825  Gross per 24 hour   Intake 720 ml   Output 1320 ml   Net -600 ml      Physical Exam  Vitals and nursing note reviewed.   Constitutional:       General: He is not in acute distress.     Appearance: He is obese. He is not toxic-appearing.   HENT:      Head: Normocephalic and atraumatic.      Nose: Nose normal.      Mouth/Throat:      Mouth: Mucous membranes are moist.   Neck:      Comments: Neck incision staples and drain in place  Cardiovascular:      Rate and Rhythm: Normal rate and regular rhythm.      Pulses: Normal pulses.      Heart sounds: Normal heart sounds. No murmur heard.    No gallop.   Pulmonary:      Effort: No respiratory distress.      Breath sounds: No wheezing or  rales.      Comments: Increased effort. 4L NC  Abdominal:      General: Bowel sounds are normal. There is distension.      Palpations: Abdomen is soft.      Tenderness: There is no abdominal tenderness. There is no guarding.   Musculoskeletal:      Right lower leg: No edema.      Left lower leg: No edema.   Skin:     General: Skin is warm and dry.   Neurological:      Mental Status: He is alert and oriented to person, place, and time.       Significant Labs: All pertinent labs within the past 24 hours have been reviewed.    Significant Imaging: I have reviewed all pertinent imaging results/findings within the past 24 hours.

## 2022-05-07 NOTE — NURSING
Home Oxygen Evaluation       Date Performed:        1)         Patient's O2 Sat on room air, while at rest:  90%                                  If O2 sats on room air at rest are 88% or below, patient qualifies. Document how many liters patients needs to maintain oxygen level >88%. Document N/A in steps 2 and 3. If 89% or above, complete steps 2.           2)         Patient's O2 Sat on room air while exercisin%                                 If O2 sats on room air while exercising remain 89% or above patient does not qualify, no further testing needed Document N/A in step 3. If O2 sats on room air while exercising are 88% or below, continue to step 3.           3)         Patient's O2 Sat while exercising on _4__L O2:  88%                                             (Must show improvement from #2 for patients to qualify)       If O2 sats improve on oxygen, patient qualifies for portable oxygen. If not, the patient does not qualify.

## 2022-05-07 NOTE — DISCHARGE INSTRUCTIONS
Ok to shower Do not stand with incision directly under shower head but allow soapy water to run over incision line to keep wound clean    If you have any problems or questions please call the following numbers :  Please call IMMMEDIATELY if you have:  Temperature of 102° F or greater  Swelling of neck or foul smelling drainage  Chest swelling  Difficulty breathing  Pain not relieved by your prescribed pain medication    Office hours:  Weekdays 8:00 am to 5:00 pm.  Please call [phone] and ask to speak with  my medical assistant, Lizzie, or my nurse Kelley, at the [facility].    After-hours & weekends:  Please call [phone] and ask to speak with the ENT resident doctor.      MEDICATIONS:  Most people need pain medication for after surgery.  If you need the narcotic pain medication, substitute this out for the acetaminophen. You should try to only use the narcotic medication if you are having trouble sleeping. This medication has a high addiction potential and we do not have a way to know who is at risk for getting addicted to narcotic pain medication.     Some people have problems with bowel movements after surgery. If you have NOT had a bowel movement 3 days after surgery, go to your local pharmacy and purchase an over the counter stool softener such as COLACE. You can also ask the pharmacist for his or her recommendation. If you still do not have a bowel movement after starting the softener, please call the office.     ACTIVITY:  It is important to walk around often while at home to keep your blood circulating and prevent blood clots.      RESTRICTED ACTIVITIES AFTER SURGERY:  AVOID all heavy lifting, straining or bending for 2 weeks.   AVOID semi-contact sports or vigorous exercising for 3-4 weeks.   Do NOT operate a motor vehicle or any type of heavy machinery within 24 hours of taking pain medication.  DO NOT smoke or be around smokers.         [employer]S   [facility] on  A[facility] - Main Office  address: [address] [place] [address] [place] [address]  Hours: Opens 8:30AM Mon  Phone: [phone]

## 2022-05-07 NOTE — ASSESSMENT & PLAN NOTE
Patient admitted with Hypoxic which is Chronic.  he is not on home oxygen because it was never delivered. Signs/symptoms of respiratory failure include- increased work of breathing present on admission and still present with ambulation  - Labs and images were reviewed.  - Supplemental oxygen was provided and noted- Nasal Cannula 4 LPM and APAP QHS  - Respiratory failure is due to- CHF, Obesity Hypoventilation and restrictive lung disease, morbid obesity, pulmonary hypertension  - O2 ordered for home  - already has APAP  - neb ordered for home  - will trial 40mg IV lasix x1 today to see if that improves his symptoms  - Pulmonary follow up on discharge

## 2022-05-07 NOTE — ASSESSMENT & PLAN NOTE
Patient with Paroxysmal (<7 days) atrial fibrillation which is controlled currently with Beta Blocker. Patient is currently in sinus rhythm.[id number] Score: 3. Anticoagulation indicated. Anticoagulation done with eliquis.

## 2022-05-07 NOTE — ASSESSMENT & PLAN NOTE
"PFTs 2020 with restrictive lung disease. Last CT 2/2022 showing "Symmetric coarse calcified pleural plaques of the bilateral hanh thoraces sparing the mediastinal pleura associated with loculated pleural effusion, subpleural fibrosis and volume loss in the left lower lobe, not significantly changed and most compatible with asbestosis."  - may need repeat PFTs as outpatient   - follow up with Pulmonary  - Per last Pulmonary note, restrictive physiology is more likely due to habitus    "

## 2022-05-07 NOTE — ASSESSMENT & PLAN NOTE
Follows with Pulmonary  On APAP 14-16 at home  Currently respiratory is adding O2 to this- will need to do this on discharge  Morbid obesity contributes to CHARISMA

## 2022-05-07 NOTE — ASSESSMENT & PLAN NOTE
Last refill on Metformin 7 18 2017 #120 with 5 refills  Last HgbA1c on 11 6 2017 -7.3 No edema.   BNP may be underestimated due to morbid obesity   Recent Labs   Lab 05/06/22  1311   BNP 46     Last TTE 4/2022 with EF 60%  Continue home lasix  Will dose 40mg IV lasix x1 today to see if he has improvement in respiratory symptoms

## 2022-05-08 NOTE — ASSESSMENT & PLAN NOTE
Patient with Paroxysmal (<7 days) atrial fibrillation which is controlled currently with Beta Blocker. Patient is currently in sinus rhythm.[id number] Score: 3. Anticoagulation indicated. Anticoagulation done with eliquis.

## 2022-05-08 NOTE — ASSESSMENT & PLAN NOTE
"PFTs 2020 with restrictive lung disease, but Pulmonary note suggests this is more due to morbid obesity. Last CT 2/2022 showing "Symmetric coarse calcified pleural plaques of the bilateral hanh thoraces sparing the mediastinal pleura associated with loculated pleural effusion, subpleural fibrosis and volume loss in the left lower lobe, not significantly changed and most compatible with asbestosis."  - may need repeat PFTs as outpatient   - follow up with Pulmonary    "

## 2022-05-08 NOTE — NURSING
[facility]  Nurses Note -- 4 Eyes      5/8/2022       Skin assessed on: 05/07/2022      [x] No Pressure Injuries Present    []Prevention Measures Documented    [x] Yes LDA Previously documented    -Surgical incision to right neck with staples and VENU drain  [] Yes New Pressure Injury Discovered   [] LDA Added      Attending RN:  Rochelle Flores, RN     Second PCT:  Isis Montgomery PCT

## 2022-05-08 NOTE — ASSESSMENT & PLAN NOTE
No edema.   BNP may be underestimated due to morbid obesity   Recent Labs   Lab 05/06/22  1311   BNP 46     Last TTE 4/2022 with EF 60%  Continue home lasix

## 2022-05-08 NOTE — ASSESSMENT & PLAN NOTE
Patient admitted with Hypoxic which is Chronic.  he is not on home oxygen because it was never delivered. Signs/symptoms of respiratory failure include- increased work of breathing, wheezing, dyspnea on exertion present on admission and still present with ambulation  - Labs and images were reviewed.  - Supplemental oxygen was provided and noted- Nasal Cannula 4 LPM and APAP QHS  - Respiratory failure is due to- CHF, Obesity Hypoventilation and restrictive lung disease, morbid obesity, pulmonary hypertension  - O2 ordered for home  - already has APAP at home  - neb ordered for home- need to make sure he has duoneb Rx too  - continue home lasix  - no signs of pneumonia despite sputum production. Increase nebs to Q4, continue mucinex, add acapella  - Pulmonary follow up on discharge

## 2022-05-08 NOTE — PROGRESS NOTES
[facility] Medicine  Progress Note    Patient Name: Janusz Montgomery Jr.  MRN: [id number]  Patient Class: IP- Inpatient   Admission Date: 5/4/2022  Length of Stay: 4 days  Attending Physician: Etta Higgins MD  Primary Care Provider: Miguelina Weathers MD        Subjective:     Principal Problem:Head and neck cancer        HPI:  Mr Janusz Montgomery Jr. is a 77 y.o. man with CAD, HFpEF, CHARISMA on APAP, asbestosis, and head/neck SCC admitted to ENT. He is s/p RADICAL NECK DISSECTION (Right) Level 2-5 with sacrifice of portion of SCM, internal jugular vein and accessory nerve due to tumor invasion on 5/4/2022. Hospital medicine consulted for shortness of breath.     Patient states he has had intermittent shortness of breath and dyspnea on exertion with lower extremity edema for 2 years. He has a history of CAD s/p stenting and diastolic CHF with last TTE 2022 EF 60% on home lasix. 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 He has PFTs from 2020 with moderate restriction. He has CHARISMA on APAP 14-16 at home. He states he was previously tested for home oxygen and was supposed to receive it but did not. He has had a couple episodes of shortness of breath/dyspnea on exertion since admission after surgery. He has a cough productive of sputum. No fever. No chest pain. No leg swelling.        Overview/Hospital Course:  No notes on file    Interval History: Feeling short of breath and wheezing just with moving from chair to bed. Has cough productive of green sputum, more productive today than yesterday. No fevers. Noticed improved breathing after sputum production.  Family at bedside.     Review of Systems   Constitutional:  Negative for chills and fever.   Respiratory:  Positive for cough, shortness of breath and wheezing.    Cardiovascular:  Negative for chest pain, palpitations and leg swelling.   Gastrointestinal:  Positive  for abdominal distention. Negative for abdominal pain, diarrhea, nausea and vomiting.   Genitourinary:  Negative for difficulty urinating.   Neurological:  Negative for weakness and numbness.   Psychiatric/Behavioral:  Negative for confusion.    Objective:     Vital Signs (Most Recent):  Temp: 97.9 °F (36.6 °C) (05/08/22 1112)  Pulse: 72 (05/08/22 1112)  Resp: (!) 24 (05/08/22 1112)  BP: (!) 117/56 (05/08/22 1112)  SpO2: 96 % (05/08/22 1112)   Vital Signs (24h Range):  Temp:  [97.3 °F (36.3 °C)-98.1 °F (36.7 °C)] 97.9 °F (36.6 °C)  Pulse:  [71-75] 72  Resp:  [20-24] 24  SpO2:  [90 %-98 %] 96 %  BP: (102-153)/(50-62) 117/56     Weight: (!) 158.8 kg (350 lb)  Body mass index is 46.18 kg/m².    Intake/Output Summary (Last 24 hours) at 5/8/2022 1139  Last data filed at 5/8/2022 0814  Gross per 24 hour   Intake 1080 ml   Output 3315 ml   Net -2235 ml        Physical Exam  Vitals and nursing note reviewed.   Constitutional:       General: He is not in acute distress.     Appearance: He is obese. He is not toxic-appearing.   HENT:      Head: Normocephalic and atraumatic.      Nose: Nose normal.      Mouth/Throat:      Mouth: Mucous membranes are moist.   Neck:      Comments: Neck incision staples and drain in place  Cardiovascular:      Rate and Rhythm: Normal rate and regular rhythm.      Pulses: Normal pulses.      Heart sounds: Normal heart sounds. No murmur heard.    No gallop.   Pulmonary:      Effort: No respiratory distress.      Breath sounds: Wheezing present. No rales.      Comments: Increased effort. 4L NC  Abdominal:      General: Bowel sounds are normal. There is distension.      Palpations: Abdomen is soft.      Tenderness: There is no abdominal tenderness. There is no guarding.   Musculoskeletal:      Right lower leg: No edema.      Left lower leg: No edema.   Skin:     General: Skin is warm and dry.   Neurological:      Mental Status: He is alert and oriented to person, place, and time.       Significant  "Labs: All pertinent labs within the past 24 hours have been reviewed.    Significant Imaging: I have reviewed all pertinent imaging results/findings within the past 24 hours.      Assessment/Plan:      * Head and neck cancer  S/p dissection - see op note  Management per ENT      Restrictive lung disease  Noted on prior PFTs 2020  See resp failure       Constipation  Bowel regimen ordered    Chronic respiratory failure with hypoxia  Patient admitted with Hypoxic which is Chronic.  he is not on home oxygen because it was never delivered. Signs/symptoms of respiratory failure include- increased work of breathing, wheezing, dyspnea on exertion present on admission and still present with ambulation  - Labs and images were reviewed.  - Supplemental oxygen was provided and noted- Nasal Cannula 4 LPM and APAP QHS  - Respiratory failure is due to- CHF, Obesity Hypoventilation and restrictive lung disease, morbid obesity, pulmonary hypertension  - O2 ordered for home  - already has APAP at home  - neb ordered for home- need to make sure he has duoneb Rx too  - continue home lasix  - no signs of pneumonia despite sputum production. Increase nebs to Q4, continue mucinex, add acapella  - Pulmonary follow up on discharge       Chronic heart failure with preserved ejection fraction  No edema.   BNP may be underestimated due to morbid obesity   Recent Labs   Lab 05/06/22  1311   BNP 46     Last TTE 4/2022 with EF 60%  Continue home lasix      Macrocytic anemia  Noted. Stable post op.       Asbestosis  PFTs 2020 with restrictive lung disease, but Pulmonary note suggests this is more due to morbid obesity. Last CT 2/2022 showing "Symmetric coarse calcified pleural plaques of the bilateral hanh thoraces sparing the mediastinal pleura associated with loculated pleural effusion, subpleural fibrosis and volume loss in the left lower lobe, not significantly changed and most compatible with asbestosis."  - may need repeat PFTs as " outpatient   - follow up with Pulmonary      CKD (chronic kidney disease) stage 3, GFR 30-59 ml/min  Cr 1.5, at baseline      Paroxysmal atrial fibrillation   Patient with Paroxysmal (<7 days) atrial fibrillation which is controlled currently with Beta Blocker. Patient is currently in sinus rhythm.[id number] Score: 3. Anticoagulation indicated. Anticoagulation done with eliquis.        Morbid obesity with body mass index of 45.0-49.9 in adult  Body mass index is 46.18 kg/m². Morbid obesity complicates all aspects of disease management from diagnostic modalities to treatment. Weight loss encouraged and health benefits explained to patient.   - discussed need for weight loss with patient at length         CHARISMA on CPAP  Follows with Pulmonary  On APAP 14-16 at home  Morbid obesity contributes to CHARISMA      Coronary artery disease involving native coronary artery of native heart without angina pectoris s/p RCA stent  Noted. No angina. Continue home Rx        VTE Risk Mitigation (From admission, onward)         Ordered     apixaban tablet 5 mg  2 times daily         05/06/22 0950                Discharge Planning   KIARA: 5/7/2022     Code Status: Full Code   Is the patient medically ready for discharge?:     Reason for patient still in hospital (select all that apply): Patient trending condition  Discharge Plan A: Home   Discharge Delays: None known at this time        I will continue to follow.       Kari Villarreal MD  Department of Hospital Medicine   [facility]

## 2022-05-08 NOTE — ASSESSMENT & PLAN NOTE
Body mass index is 46.18 kg/m². Morbid obesity complicates all aspects of disease management from diagnostic modalities to treatment. Weight loss encouraged and health benefits explained to patient.   - discussed need for weight loss with patient at length

## 2022-05-08 NOTE — SUBJECTIVE & OBJECTIVE
Interval History: Feeling short of breath and wheezing just with moving from chair to bed. Has cough productive of green sputum, more productive today than yesterday. No fevers. Noticed improved breathing after sputum production.  Family at bedside.     Review of Systems   Constitutional:  Negative for chills and fever.   Respiratory:  Positive for cough, shortness of breath and wheezing.    Cardiovascular:  Negative for chest pain, palpitations and leg swelling.   Gastrointestinal:  Positive for abdominal distention. Negative for abdominal pain, diarrhea, nausea and vomiting.   Genitourinary:  Negative for difficulty urinating.   Neurological:  Negative for weakness and numbness.   Psychiatric/Behavioral:  Negative for confusion.    Objective:     Vital Signs (Most Recent):  Temp: 97.9 °F (36.6 °C) (05/08/22 1112)  Pulse: 72 (05/08/22 1112)  Resp: (!) 24 (05/08/22 1112)  BP: (!) 117/56 (05/08/22 1112)  SpO2: 96 % (05/08/22 1112)   Vital Signs (24h Range):  Temp:  [97.3 °F (36.3 °C)-98.1 °F (36.7 °C)] 97.9 °F (36.6 °C)  Pulse:  [71-75] 72  Resp:  [20-24] 24  SpO2:  [90 %-98 %] 96 %  BP: (102-153)/(50-62) 117/56     Weight: (!) 158.8 kg (350 lb)  Body mass index is 46.18 kg/m².    Intake/Output Summary (Last 24 hours) at 5/8/2022 1139  Last data filed at 5/8/2022 0814  Gross per 24 hour   Intake 1080 ml   Output 3315 ml   Net -2235 ml        Physical Exam  Vitals and nursing note reviewed.   Constitutional:       General: He is not in acute distress.     Appearance: He is obese. He is not toxic-appearing.   HENT:      Head: Normocephalic and atraumatic.      Nose: Nose normal.      Mouth/Throat:      Mouth: Mucous membranes are moist.   Neck:      Comments: Neck incision staples and drain in place  Cardiovascular:      Rate and Rhythm: Normal rate and regular rhythm.      Pulses: Normal pulses.      Heart sounds: Normal heart sounds. No murmur heard.    No gallop.   Pulmonary:      Effort: No respiratory distress.       Breath sounds: Wheezing present. No rales.      Comments: Increased effort. 4L NC  Abdominal:      General: Bowel sounds are normal. There is distension.      Palpations: Abdomen is soft.      Tenderness: There is no abdominal tenderness. There is no guarding.   Musculoskeletal:      Right lower leg: No edema.      Left lower leg: No edema.   Skin:     General: Skin is warm and dry.   Neurological:      Mental Status: He is alert and oriented to person, place, and time.       Significant Labs: All pertinent labs within the past 24 hours have been reviewed.    Significant Imaging: I have reviewed all pertinent imaging results/findings within the past 24 hours.

## 2022-05-09 NOTE — PLAN OF CARE
TN spoke with patient and pt's sister at bedside regarding discharge planning. Continuing to wean oxygen. VENU drain in place.     Informed patient of therapy recommendation for outpatient PT/OT, and explained level of care for outpt vs home health therapy. Pt verbalized understanding, but will prefer home health due to upcoming radiation therapy. Pt stated home health would be a better option for him and his wife at this time.     Pt accepting to [facility] home health upon medically clear to [place], update message sent via care port.     Pt will need nebulizer and oxygen at [place], [facility] notified.     Message sent for pulmonology to schedule follow up. Referred to outpatient case management     Senior resource guide and printout for Meals on Wheels reviewed and given to pt at bedside.    05/09/22 1212   Discharge Reassessment   Assessment Type Discharge Planning Reassessment   Did the patient's condition or plan change since previous assessment? No   Discharge Plan discussed with: Patient   Communicated KIARA with patient/caregiver Date not available/Unable to determine   Discharge Plan A Home Health   Discharge Plan B Home with family   DME Needed Upon Discharge  oxygen;nebulizer   Discharge Barriers Identified None   Why the patient remains in the hospital Requires continued medical care   Post-Acute Status   Post-Acute Authorization HME;Home Health   HME Status Pending medical clearance/testing   Home Health Status Pending medical clearance/testing   Coverage [employer] Managed medicare   Discharge Delays None known at this time

## 2022-05-09 NOTE — PLAN OF CARE
05/09/22 1205   Medicare Message   Important Message from Medicare regarding Discharge Appeal Rights Given to patient/caregiver;Explained to patient/caregiver;Signed/date by patient/caregiver   Date IMM was signed 05/09/22   Time IMM was signed 1209

## 2022-05-09 NOTE — PROGRESS NOTES
[facility]  Otorhinolaryngology-Head & Neck Surgery  Progress Note    Patient Name: Janusz Montgomery Jr.  MRN: [id number]  Code Status: Full Code  Admission Date: 5/4/2022  Hospital Length of Stay: 5 days  Attending Physician: Etta Higgins MD  Primary Care Provider: Miguelina Weathers MD    Subjective:   Delayed note entry from 5-7-22  Interval History: still having some sob with lying flat due to chronic lung issues. Pending walk test     Post-Op Info:  Procedure(s) (LRB):  RADICAL NECK DISSECTION (Right)  LARYNGOSCOPY, DIRECT possible biopsy (N/A)   5 Days Post-Op  Hospital Day: 6      Medications:  Continuous Infusions:  Scheduled Meds:   acyclovir  800 mg Oral BID    albuterol-ipratropium  3 mL Nebulization Q4H    apixaban  5 mg Oral BID    clopidogreL  75 mg Oral Daily    dextromethorphan-guaiFENesin  mg  1 tablet Oral BID    furosemide  20 mg Oral Daily    gabapentin  300 mg Oral TID    losartan  12.5 mg Oral Daily    melatonin  9 mg Oral Nightly    metoprolol succinate  25 mg Oral Daily    oxybutynin  5 mg Oral Daily    polyethylene glycol  17 g Oral Daily    senna-docusate 8.6-50 mg  2 tablet Oral BID    tamsulosin  1 capsule Oral Daily    venlafaxine  150 mg Oral BID     PRN Meds:artificial tears, ondansetron, oxyCODONE-acetaminophen, sodium chloride 0.9%, traZODone     Objective:     Vital Signs (24h Range):  Temp:  [97.5 °F (36.4 °C)-97.9 °F (36.6 °C)] 97.8 °F (36.6 °C)  Pulse:  [70-85] 78  Resp:  [18-24] 22  SpO2:  [93 %-99 %] 96 %  BP: (117-172)/(54-82) 172/77       Lines/Drains/Airways     Drain  Duration                Closed/Suction Drain 05/04/22 1535 Right Neck Bulb 10 Fr. 4 days          Peripheral Intravenous Line  Duration                Peripheral IV - Single Lumen 05/04/22 0810 20 G Left;Posterior Forearm 5 days                Physical Exam  Vitals reviewed.   Neck:      Comments: Incision c/d/i   erika drain with serosang material   Neurological:      Mental Status:  He is alert.           Assessment/Plan:     Active Diagnoses:    Diagnosis Date Noted POA    PRINCIPAL PROBLEM:  Head and neck cancer [C76.0] 05/05/2022 Yes    Restrictive lung disease [J98.4] 05/07/2022 Yes    Constipation [K59.00] 05/06/2022 Yes    Chronic heart failure with preserved ejection fraction [I50.32] 02/04/2022 Yes    Chronic respiratory failure with hypoxia [J96.11] 02/04/2022 Yes    Macrocytic anemia [D53.9] 11/09/2020 Yes    Asbestosis [J61] 11/05/2020 Yes    CKD (chronic kidney disease) stage 3, GFR 30-59 ml/min [N18.30] 08/24/2020 Yes    Paroxysmal atrial fibrillation  [I48.0] 07/06/2016 Yes    Morbid obesity with body mass index of 45.0-49.9 in adult [E66.01, Z68.42] 08/14/2015 Not Applicable    CHARISMA on CPAP [G47.33, Z99.89]  Not Applicable    Coronary artery disease involving native coronary artery of native heart without angina pectoris s/p RCA stent [I25.10]  Yes      Problems Resolved During this Admission:    Diagnosis Date Noted Date Resolved POA    Hypoxia [R09.02] 05/06/2022 05/07/2022 Yes     Dc home with oxygen and nebulizer once supplies ready  F/u outpt for erika removal- still with 100 cc output unable to remove     Etta Higgins MD  Otorhinolaryngology-Head & Neck Surgery  [facility]

## 2022-05-09 NOTE — PT/OT/SLP PROGRESS
Physical Therapy Treatment    Patient Name:  Janusz Montgomery Jr.   MRN:  [id number]    Recommendations:     Discharge Recommendations:  outpatient PT   Discharge Equipment Recommendations: oxygen   Barriers to discharge: None    Assessment:     Janusz Montgomery Jr. is a 77 y.o. male admitted with a medical diagnosis of Head and neck cancer.  He presents with the following impairments/functional limitations:  weakness, impaired endurance, gait instability, decreased lower extremity function, decreased safety awareness, impaired cardiopulmonary response to activity, impaired balance, decreased upper extremity function . Pt required rest breaks and VC for pursed lip breathing technique throughout therapy treatment, SpO2 at rest 98% ,desated to low 70s ( despite on 4L O2NC  ) on exertions per telemetry monitor , required rest break to recover to 90% . Pt ambulated back to room on 6 L(unable to placed on 5L  dues to portable tank w/o 5L option) , SpO2 maintains upper 90's ,  100 % at the end of treatment.     Rehab Prognosis: Fair; patient would benefit from acute skilled PT services to address these deficits and reach maximum level of function.    Recent Surgery: Procedure(s) (LRB):  RADICAL NECK DISSECTION (Right)  LARYNGOSCOPY, DIRECT possible biopsy (N/A) 5 Days Post-Op    Plan:     During this hospitalization, patient to be seen  (3-5x) to address the identified rehab impairments via gait training, therapeutic activities, therapeutic exercises and progress toward the following goals:    · Plan of Care Expires:  05/19/22    Subjective     Chief Complaint: none   Patient/Family Comments/goals: pt is very pleasant and motivated to participate in therapy treatment.   Pain/Comfort:  · Pain Rating 1: 0/10  · Pain Rating Post-Intervention 1: 0/10      Objective:     Communicated with nurse  prior to session.  Patient found up in chair with oxygen, telemetry, VENU drain, pulse ox (continuous) upon PT entry to room.     General  Precautions: Standard, fall, respiratory   Orthopedic Precautions:N/A   Braces: N/A  Respiratory Status: Nasal cannula, flow 5 L/min     Functional Mobility:  · Transfers:     · Sit to Stand: x 2 trials  supervision with no AD and rolling walker  · Gait:  Pt ambulated 250 ft with RW, SBA . Pt required 4 standing rest breaks during gait training 2* to SOB. Noted with decreased sonali, min lateral swaying however no LOB. V/T cues for safety, pursed lip breathing technique and RW management.   · Balance:good         AM-PAC 6 CLICK MOBILITY  Turning over in bed (including adjusting bedclothes, sheets and blankets)?: 4  Sitting down on and standing up from a chair with arms (e.g., wheelchair, bedside commode, etc.): 4  Moving from lying on back to sitting on the side of the bed?: 4  Moving to and from a bed to a chair (including a wheelchair)?: 3  Need to walk in hospital room?: 3  Climbing 3-5 steps with a railing?: 3  Basic Mobility Total Score: 21       Therapeutic Activities and Exercises:  BLE HEP (in supine, sitting, standing)  given with instructions and demonstrations (pt and pt's daughter verbalized understanding). Encouraged pt to perform BLE ex's per handout throughout the day. Educated pt on pursed lip breathing technique and energy conservation techniques  , handouts provided.     Patient left up in chair with all lines intact, call button in reach and nurse and family  present..    GOALS:   Multidisciplinary Problems     Physical Therapy Goals        Problem: Physical Therapy    Goal Priority Disciplines Outcome Goal Variances Interventions   Physical Therapy Goal     PT, PT/OT Ongoing, Progressing     Description: Goals to be met by: 5/19/22    Patient will increase functional independence with mobility by performing:    Supine to sit with Sandy  Sit to supine with Sandy  Sit to stand transfer with Modified Sandy  Bed to chair transfer with Modified Sandy using Rolling  Walker  Gait  x 200 feet with Modified King using Rolling Walker.   Stand for 4 minutes with Modified [place] using Rolling Walker while maintaining spo2 > 90%  Lower extremity exercise program x10 reps per handout, with independence                     Time Tracking:     PT Received On: 05/09/22  PT Start Time: 1510     PT Stop Time: 1535  PT Total Time (min): 25 min     Billable Minutes: Gait Training 15 and Therapeutic Activity 10    Treatment Type: Treatment  PT/PTA: PTA     PTA Visit Number: 1     05/09/2022

## 2022-05-09 NOTE — PLAN OF CARE
Pt with SOB on exertion, with short transfer from chair to bed, remains on 4L NC to keep sats >90%. Audible wheezing, especially after transfers. Expectorating thick, green sputum. No fall/injury through shift, OOB to chair for meals.

## 2022-05-09 NOTE — SUBJECTIVE & OBJECTIVE
Interval History: Sitting up in chair this morning. Says he may be feeling a bit better. Denies cough/sputum. No fever. Had a BM.    Review of Systems   Constitutional:  Negative for chills and fever.   Respiratory:  Positive for shortness of breath and wheezing. Negative for cough.    Cardiovascular:  Negative for chest pain, palpitations and leg swelling.   Gastrointestinal:  Negative for abdominal distention, abdominal pain, diarrhea, nausea and vomiting.   Genitourinary:  Negative for difficulty urinating.   Neurological:  Negative for weakness and numbness.   Psychiatric/Behavioral:  Negative for confusion.    Objective:     Vital Signs (Most Recent):  Temp: 97.8 °F (36.6 °C) (05/09/22 0732)  Pulse: 78 (05/09/22 0732)  Resp: 19 (05/09/22 0848)  BP: (!) 172/77 (05/09/22 0732)  SpO2: 96 % (05/09/22 0732)   Vital Signs (24h Range):  Temp:  [97.5 °F (36.4 °C)-97.9 °F (36.6 °C)] 97.8 °F (36.6 °C)  Pulse:  [70-85] 78  Resp:  [18-24] 19  SpO2:  [93 %-99 %] 96 %  BP: (117-172)/(54-82) 172/77     Weight: (!) 158.8 kg (350 lb)  Body mass index is 46.18 kg/m².    Intake/Output Summary (Last 24 hours) at 5/9/2022 0922  Last data filed at 5/8/2022 1859  Gross per 24 hour   Intake 480 ml   Output 1720 ml   Net -1240 ml        Physical Exam  Vitals and nursing note reviewed.   Constitutional:       General: He is not in acute distress.     Appearance: He is obese. He is not toxic-appearing.   HENT:      Head: Normocephalic and atraumatic.      Nose: Nose normal.      Mouth/Throat:      Mouth: Mucous membranes are moist.   Neck:      Comments: Neck incision staples and drain in place  Cardiovascular:      Rate and Rhythm: Normal rate and regular rhythm.      Pulses: Normal pulses.      Heart sounds: Normal heart sounds. No murmur heard.    No gallop.   Pulmonary:      Effort: Pulmonary effort is normal. No respiratory distress.      Breath sounds: No wheezing or rales.      Comments: 4L NC  Abdominal:      General: Bowel  sounds are normal. There is distension.      Palpations: Abdomen is soft.      Tenderness: There is no abdominal tenderness. There is no guarding.   Musculoskeletal:      Right lower leg: No edema.      Left lower leg: No edema.   Skin:     General: Skin is warm and dry.   Neurological:      Mental Status: He is alert and oriented to person, place, and time.       Significant Labs: All pertinent labs within the past 24 hours have been reviewed.    Significant Imaging: I have reviewed all pertinent imaging results/findings within the past 24 hours.

## 2022-05-09 NOTE — NURSING
[facility] MEWS TRIGGER FOLLOW UP       MEWS Monitoring, Score is: 3  Indication for review: RR 30    Bedside Nurse, Silvia contacted, no concerns verbalized at this time, instructed to call [phone] for further concerns or assistance. RRN viewed pt in bed AAO X 4; states he gets SOB when he moves, but he stated he was comfortable..

## 2022-05-09 NOTE — PLAN OF CARE
Problem: Physical Therapy  Goal: Physical Therapy Goal  Description: Goals to be met by: 5/19/22    Patient will increase functional independence with mobility by performing:    Supine to sit with [place]  Sit to supine with [place]  Sit to stand transfer with Modified [place]  Bed to chair transfer with Modified [place] using Rolling Walker  Gait  x 200 feet with Modified [place] using Rolling Walker.   Stand for 4 minutes with Modified [place] using Rolling Walker while maintaining spo2 > 90%  Lower extremity exercise program x10 reps per handout, with independence    Outcome: Ongoing, Progressing

## 2022-05-09 NOTE — PLAN OF CARE
Patient remains free from injury and falls this shift. Incision to right neck open to air with staples. Still having SOB from moving from chair to bed. Patient resting comfortably. Plan of care continued.

## 2022-05-09 NOTE — PROGRESS NOTES
[facility]  Otorhinolaryngology-Head & Neck Surgery  Progress Note    Patient Name: Janusz Montgomery Jr.  MRN: [id number]  Code Status: Full Code  Admission Date: 5/4/2022  Hospital Length of Stay: 5 days  Attending Physician: Etta Higgins MD  Primary Care Provider: Miguelina Weathers MD    Subjective:     Interval History: had planned to try to dc home Saturday but difficulty with getting oxygen and then yesterday pt noted to have increased cough and sputum therefore continued monitoring to trend his condition.     Post-Op Info:  Procedure(s) (LRB):  RADICAL NECK DISSECTION (Right)  LARYNGOSCOPY, DIRECT possible biopsy (N/A)   5 Days Post-Op  Hospital Day: 6      Medications:  Continuous Infusions:  Scheduled Meds:   acyclovir  800 mg Oral BID    albuterol-ipratropium  3 mL Nebulization Q4H    apixaban  5 mg Oral BID    clopidogreL  75 mg Oral Daily    dextromethorphan-guaiFENesin  mg  1 tablet Oral BID    furosemide  20 mg Oral Daily    gabapentin  300 mg Oral TID    losartan  12.5 mg Oral Daily    melatonin  9 mg Oral Nightly    metoprolol succinate  25 mg Oral Daily    oxybutynin  5 mg Oral Daily    polyethylene glycol  17 g Oral Daily    senna-docusate 8.6-50 mg  2 tablet Oral BID    tamsulosin  1 capsule Oral Daily    venlafaxine  150 mg Oral BID     PRN Meds:artificial tears, ondansetron, oxyCODONE-acetaminophen, sodium chloride 0.9%, traZODone     Objective:     Vital Signs (24h Range):  Temp:  [97.5 °F (36.4 °C)-97.9 °F (36.6 °C)] 97.8 °F (36.6 °C)  Pulse:  [70-85] 78  Resp:  [18-24] 22  SpO2:  [93 %-99 %] 96 %  BP: (117-172)/(54-82) 172/77       Lines/Drains/Airways     Drain  Duration                Closed/Suction Drain 05/04/22 1535 Right Neck Bulb 10 Fr. 4 days          Peripheral Intravenous Line  Duration                Peripheral IV - Single Lumen 05/04/22 0810 20 G Left;Posterior Forearm 5 days                Physical Exam  Vitals reviewed.   HENT:      Head:       Comments: Scant sero sang material in erika drain  Incision c/d/i with staples, mild crusting on incision line    Neck:      Trachea: Trachea normal.   Pulmonary:      Breath sounds: No stridor.      Comments: Voice normal  Neurological:      Mental Status: He is alert.         Significant Labs:  CBC:   Recent Labs   Lab 05/08/22  0406   WBC 10.41   RBC 3.09*   HGB 10.6*   HCT 32.3*      *   MCH 34.3*   MCHC 32.8     CMP:   Recent Labs   Lab 05/05/22  0349 05/07/22  0559 05/08/22  0406   *   < > 98   CALCIUM 9.3   < > 8.6*   ALBUMIN 3.3*  --   --    PROT 6.6  --   --       < > 138   K 5.1   < > 4.3   CO2 26   < > 26      < > 102   BUN 25*   < > 33*   CREATININE 1.5*   < > 1.5*   ALKPHOS 119  --   --    ALT 14  --   --    AST 12  --   --    BILITOT 0.8  --   --     < > = values in this interval not displayed.         Assessment/Plan:     Active Diagnoses:    Diagnosis Date Noted POA    PRINCIPAL PROBLEM:  Head and neck cancer [C76.0] 05/05/2022 Yes    Restrictive lung disease [J98.4] 05/07/2022 Yes    Constipation [K59.00] 05/06/2022 Yes    Chronic heart failure with preserved ejection fraction [I50.32] 02/04/2022 Yes    Chronic respiratory failure with hypoxia [J96.11] 02/04/2022 Yes    Macrocytic anemia [D53.9] 11/09/2020 Yes    Asbestosis [J61] 11/05/2020 Yes    CKD (chronic kidney disease) stage 3, GFR 30-59 ml/min [N18.30] 08/24/2020 Yes    Paroxysmal atrial fibrillation  [I48.0] 07/06/2016 Yes    Morbid obesity with body mass index of 45.0-49.9 in adult [E66.01, Z68.42] 08/14/2015 Not Applicable    CHARISMA on CPAP [G47.33, Z99.89]  Not Applicable    Coronary artery disease involving native coronary artery of native heart without angina pectoris s/p RCA stent [I25.10]  Yes      Problems Resolved During this Admission:    Diagnosis Date Noted Date Resolved POA    Hypoxia [R09.02] 05/06/2022 05/07/2022 Yes     Will defer to hospitalist regarding when to dc   Continue erika drain  monitoring    Etta Higgins MD  Otorhinolaryngology-Head & Neck Surgery  [facility]

## 2022-05-09 NOTE — TELEPHONE ENCOUNTER
Scheduled an appointment to see provider.    CHRISTY Bonilla                ----- Message from Yesica Gold RN sent at 5/9/2022 12:19 PM CDT -----  Patient will need pulm follow up upon discharge. Please contact patient to schedule.       Thank you

## 2022-05-09 NOTE — NURSING
Ambulated with patient in the hallway; walked half the nursing unit and back to patient room.    Pt tolerated ok. SOB was minimal however SPO2 on monitor dropped and sustained to about 74% on 4L NC. When pausing in the hallway O2 began rising back into 80's.     Patient assisted by standby assist back to room and sat in chair. O2 increased to 93% on 4L NC.

## 2022-05-09 NOTE — ASSESSMENT & PLAN NOTE
Patient with Paroxysmal (<7 days) atrial fibrillation which is controlled currently with Beta Blocker. Patient is currently in sinus rhythm.[id number] Score: 3. Anticoagulation indicated. Anticoagulation done with eliquis.

## 2022-05-09 NOTE — PT/OT/SLP PROGRESS
Occupational Therapy   Treatment    Name: Janusz Montgomery Jr.  MRN: [id number]  Admitting Diagnosis:  Head and neck cancer  5 Days Post-Op    Recommendations:     Discharge Recommendations: home health PT, outpatient OT  Discharge Equipment Recommendations:  oxygen  Barriers to discharge:  None    Assessment:     Janusz Montgomery Jr. is a 77 y.o. male with a medical diagnosis of Head and neck cancer.  Performance deficits affecting function are weakness, impaired endurance, impaired self care skills, impaired functional mobilty, gait instability, decreased upper extremity function, decreased lower extremity function, decreased safety awareness, impaired cardiopulmonary response to activity.     Rehab Prognosis:  Good; patient would benefit from acute skilled OT services to address these deficits and reach maximum level of function.       Plan:     Patient to be seen 3 x/week, 5 x/week to address the above listed problems via self-care/home management, therapeutic activities, therapeutic exercises  · Plan of Care Expires: 05/19/22  · Plan of Care Reviewed with: patient    Subjective     Pain/Comfort:  · Pain Rating 1: 0/10  · Pain Rating Post-Intervention 1: 0/10    Objective:     Communicated with: Nurse prior to session.  Patient found HOB elevated with oxygen, telemetry, VENU drain, pulse ox (continuous) upon OT entry to room.    General Precautions: Standard, fall, respiratory   Orthopedic Precautions:N/A   Braces: N/A  Respiratory Status: Nasal cannula, flow 4 L/min     Occupational Performance:     Bed Mobility:    · Pt found w/ HOB elevated; just returned to bed w/ nsg prior to nursing     Functional Mobility/Transfers:  · N/T as noted above    Activities of Daily Living:  · Pt denied needs at this time      [facility] 6 Click ADL: 24    Treatment & Education:  -Pt performed the following BUE TE w/ use of 5.5 lb dowel madonna for increasing UB strength and endurance for safe performance w/ ADLs and functional mobility; pt  tolerated 1 set x 15 reps w/ rest breaks as needed:    -bicep flexion     -chest press    -forward shoulder rows    -reverse shoulder rows    -shoulder flexion    -overhead tricep ext  -Dtr at side inquiring about what to use at home; encouraged dtr to avoid anythign greater than 5 lbs to prevent injury/straining. Suggested 2-2.5 lb wrist weight or dumbbells.   -Pt demo'd good PLB w/ cueing and positive feedback.  -Questions and concerns addressed within OT scope.     Patient left HOB elevated with all lines intact, call button in reach, bed alarm on and family presentEducation:      GOALS:   Multidisciplinary Problems     Occupational Therapy Goals        Problem: Occupational Therapy    Goal Priority Disciplines Outcome Interventions   Occupational Therapy Goal     OT, PT/OT Ongoing, Progressing    Description: Goals to be met by: 5/19/22     Patient will increase functional independence with ADLs by performing:    LE Dressing with Modified [place].  Grooming while standing at sink with Modified [place].  Toileting from toilet with Modified [place] for hygiene and clothing management.   Supine to sit with Modified [place].  Step transfer with Modified [place]  Toilet transfer to toilet with Modified [place].  Upper extremity exercise program x15 reps per handout, with independence.  Implementation of PLB and energy conservation strategies into daily routines w/ (I).                      Time Tracking:     OT Date of Treatment: 05/09/22  OT Start Time: 1631  OT Stop Time: 1648  OT Total Time (min): 17 min    Billable Minutes:Therapeutic Exercise 17  Total Time 17    OT/COSTA: OT          5/9/2022

## 2022-05-09 NOTE — ASSESSMENT & PLAN NOTE
Patient admitted with Hypoxic which is Chronic.  he is not on home oxygen because it was never delivered. Signs/symptoms of respiratory failure include- increased work of breathing, wheezing, dyspnea on exertion present on admission and still present with ambulation  - Labs and images were reviewed.  - Supplemental oxygen was provided and noted- Nasal Cannula 4 LPM and APAP QHS  - Respiratory failure is due to- CHF, Obesity Hypoventilation and restrictive lung disease, morbid obesity, pulmonary hypertension  - O2 ordered for home  - already has APAP at home  - neb ordered for home- need to make sure he has duoneb Rx too  - continue home lasix  - no signs of pneumonia.   - continue nebs, mucinex, acapella  - will observe breathing with activity today. If not yet improved, will repeat workup with CBC, procal, BNP, CT chest, TTE  - Pulmonary follow up on discharge      no abnormal vaginal bleeding

## 2022-05-09 NOTE — PLAN OF CARE
Problem: Occupational Therapy  Goal: Occupational Therapy Goal  Description: Goals to be met by: 5/19/22     Patient will increase functional independence with ADLs by performing:    LE Dressing with Modified [place].  Grooming while standing at sink with Modified [place].  Toileting from toilet with Modified [place] for hygiene and clothing management.   Supine to sit with Modified [place].  Step transfer with Modified [place]  Toilet transfer to toilet with Modified [place].  Upper extremity exercise program x15 reps per handout, with independence.  Implementation of PLB and energy conservation strategies into daily routines w/ (I).     Outcome: Ongoing, Progressing     Pt very pleasant and willing to participate in tx session this date; pt had just returned to bed after being up in chair majority of the day and ambulating x 2 trials but was willing to participate in BUE TE/AROM w/ use of dowel madonna for increasing overall UB strength, endurance and mobility. Pt tolerated well w/o concerns, SPO2 >95% on 5L O2 NC. Pt will continue to benefit from skilled acute OT services to maximize functional capacity for safe performance w/ ADLs and functional mobility.

## 2022-05-10 NOTE — PLAN OF CARE
Problem: Adult Inpatient Plan of Care  Goal: Plan of Care Review  Outcome: Ongoing, Progressing  Flowsheets (Taken 5/10/2022 0321)  Plan of Care Reviewed With: patient    Patient remains free from injury and falls this shift. Incision to neck with staples intact. SOB noted when moving from chair to bed. Recovery time improving somewhat. Patient medicated for pain once this shift with adequate relief. Patient remains on 5 liters nasal cannula. Sleeping with CPAP. Current plan of care reviewed with patient and continued.

## 2022-05-10 NOTE — HPI
Janusz Montgomery is a 77 year old male with asbestos exposure (calcified pleural plaques and chronic loculated small left pleural effusion), HFpEF, CAD (s/p 3 stents), PAF (on eliquis), CKD, basal cell carcinoma, prostate cancer (brachytherapy in 8/2018), GERD, CHARISMA on CPAP (compliant with treatment), basal cell carcinoma, chronic dyspnea on exertion, morbid obesity, and currently admitted with head and neck SCC for radical neck dissection on the right level 2-5 with sacrifice of portion of SCM, Internal jugular vein and accessory nerve due to tumor invasion on 5/4/2022.    He had a distant exposure to asbestos in high school.  He is a never smoker.  Formerly prescribed breo, but this didn't help him so it was discontinued.  PFTs performed 2/2022 showed restriction (TLC 53%, FVC 62%), and reduced DLCO (50%).  Currently requiring 4-5 LPM via NC to maintain saturations.  He states his biggest issue at this time is the acute onset of wheezing, and shortness of breath that has occurred while eating.  He was diagnosed as requiring supplemental O2 in February and has had difficulty obtaining his equipment.  I suspect he has been hypoxic and requires O2 for quite some time.  He has been aggressively diuresed during this stay, and has normal BNP.  He states his breathing is improving daily, and he was able to ambulate throughout the unit today with PT.     CT Chest 2/2020:  Stable disease since 2019.  LLL loculated pleural effusion.  Bilateral calcified pleural plaques.

## 2022-05-10 NOTE — PROGRESS NOTES
[facility]  Otorhinolaryngology-Head & Neck Surgery  Progress Note    Patient Name: Janusz Montgomery Jr.  MRN: [id number]  Code Status: Full Code  Admission Date: 5/4/2022  Hospital Length of Stay: 6 days  Attending Physician: Etta Higgins MD  Primary Care Provider: Miguelina Weathers MD    Subjective:     Interval History: sputum improving. Sob only slight after dinner last night      Post-Op Info:  Procedure(s) (LRB):  RADICAL NECK DISSECTION (Right)  LARYNGOSCOPY, DIRECT possible biopsy (N/A)   6 Days Post-Op  Hospital Day: 7      Medications:  Continuous Infusions:  Scheduled Meds:   acyclovir  800 mg Oral BID    albuterol-ipratropium  3 mL Nebulization Q4H    apixaban  5 mg Oral BID    clopidogreL  75 mg Oral Daily    dextromethorphan-guaiFENesin  mg  1 tablet Oral BID    furosemide  20 mg Oral Daily    gabapentin  300 mg Oral TID    losartan  12.5 mg Oral Daily    melatonin  9 mg Oral Nightly    metoprolol succinate  25 mg Oral Daily    oxybutynin  5 mg Oral Daily    polyethylene glycol  17 g Oral Daily    senna-docusate 8.6-50 mg  2 tablet Oral BID    tamsulosin  1 capsule Oral Daily    venlafaxine  150 mg Oral BID     PRN Meds:artificial tears, ondansetron, oxyCODONE-acetaminophen, sodium chloride 0.9%, traZODone     Objective:     Vital Signs (24h Range):  Temp:  [97 °F (36.1 °C)-98 °F (36.7 °C)] 97 °F (36.1 °C)  Pulse:  [58-87] 58  Resp:  [17-30] 20  SpO2:  [92 %-100 %] 95 %  BP: (132-157)/(58-70) 134/58       Lines/Drains/Airways     Drain  Duration                Closed/Suction Drain 05/04/22 1535 Right Neck Bulb 10 Fr. 5 days          Peripheral Intravenous Line  Duration                Peripheral IV - Single Lumen 05/04/22 0810 20 G Left;Posterior Forearm 6 days                Physical Exam  Vitals reviewed.   HENT:      Head: Normocephalic.   Neck:      Trachea: Trachea normal.      Comments: Incision c/d/i with staples  No hematoma nor seroma  nontender to  palpation  Scant serosang drainage in erika , strips well   Pulmonary:      Breath sounds: No stridor.   Neurological:      Mental Status: He is alert.         Significant Labs:  CBC:   Recent Labs   Lab 05/08/22  0406   WBC 10.41   RBC 3.09*   HGB 10.6*   HCT 32.3*      *   MCH 34.3*   MCHC 32.8     CMP:   Recent Labs   Lab 05/05/22  0349 05/07/22  0559 05/08/22  0406   *   < > 98   CALCIUM 9.3   < > 8.6*   ALBUMIN 3.3*  --   --    PROT 6.6  --   --       < > 138   K 5.1   < > 4.3   CO2 26   < > 26      < > 102   BUN 25*   < > 33*   CREATININE 1.5*   < > 1.5*   ALKPHOS 119  --   --    ALT 14  --   --    AST 12  --   --    BILITOT 0.8  --   --     < > = values in this interval not displayed.         Assessment/Plan:     Active Diagnoses:    Diagnosis Date Noted POA    PRINCIPAL PROBLEM:  Head and neck cancer [C76.0] 05/05/2022 Yes    Restrictive lung disease [J98.4] 05/07/2022 Yes    Constipation [K59.00] 05/06/2022 Yes    Chronic heart failure with preserved ejection fraction [I50.32] 02/04/2022 Yes    Chronic respiratory failure with hypoxia [J96.11] 02/04/2022 Yes    Macrocytic anemia [D53.9] 11/09/2020 Yes    Asbestosis [J61] 11/05/2020 Yes    CKD (chronic kidney disease) stage 3, GFR 30-59 ml/min [N18.30] 08/24/2020 Yes    Paroxysmal atrial fibrillation  [I48.0] 07/06/2016 Yes    Morbid obesity with body mass index of 45.0-49.9 in adult [E66.01, Z68.42] 08/14/2015 Not Applicable    CHARISMA on CPAP [G47.33, Z99.89]  Not Applicable    Coronary artery disease involving native coronary artery of native heart without angina pectoris s/p RCA stent [I25.10]  Yes      Problems Resolved During this Admission:    Diagnosis Date Noted Date Resolved POA    Hypoxia [R09.02] 05/06/2022 05/07/2022 Yes   Walk test completed yesterday; recommended by medicine team for additional 24 hour monitoring to ensure course remains improved since Sunday  Will defer to hospital medicine team about  discharge plans; from surgical recovery standpoint, ok to dc but would like to ensure cleared for dc by medicine team ; will keep inpatient if recommended to continue monitoring  erika drain decreasing in amount but still too much to remove  Continue pt/ot for shoulder /sacrifice of CN XI due to cancer involvement    Etta Higgins MD  Otorhinolaryngology-Head & Neck Surgery  [facility]

## 2022-05-10 NOTE — ASSESSMENT & PLAN NOTE
Recent evaluation in February shows no interval change since 2019.    - if symptoms persist, would repeat CT thorax, otherwise, repeat as per outpatient pulm recs.

## 2022-05-10 NOTE — PT/OT/SLP PROGRESS
Physical Therapy      Patient Name:  Janusz Montgomery Jr.   MRN:  [id number]    Patient not seen today secondary to Other (pt just ambulated the hallway with OT), second attempt , pt is receiving breathing treatment ) . Will follow-up as able later hour/day .

## 2022-05-10 NOTE — SUBJECTIVE & OBJECTIVE
"Past Medical History:   Diagnosis Date    (HFpEF) heart failure with preserved ejection fraction 2/4/2022    NAT (acute kidney injury) 3/19/2017    ALLERGIC RHINITIS     Anemia     Anxiety     Basal cell carcinoma 10/19/2018    forehead and right medial shoulder    Basal cell carcinoma 01/09/2019    left nasal bridge and left posterior ear    Basal cell carcinoma 06/12/2020    left lower medial leg - Efudex    Basal cell carcinoma 11/30/2020    left medial upper eyelid canthus superficial BCC    Chronic rhinitis 5/3/2013    Chronic rhinitis 5/3/2013    Coronary artery disease involving native coronary artery of native heart without angina pectoris s/p RCA stent     Cortical cataract of both eyes 3/18/2016    Delayed sleep phase syndrome 3/13/2019    Depression     Erectile dysfunction 3/24/2014    Erectile dysfunction 3/24/2014    Essential hypertension     GERD (gastroesophageal reflux disease) 7/25/2012    Gout, arthritis     Grade III open fracture of left tibia and fibula s/p ex-fix on 7/1/16 and ORIF of left tibia on 7/15 7/6/2016    H/O: iritis     Helicobacter pylori (H. pylori) infection     Treated    Hepatitis     Herpes simplex keratoconjunctivitis 9/30/2015    - on acyclovir - followed by opthalmology, Dr. Uribe     Herpes simplex keratoconjunctivitis 9/30/2015    - on acyclovir - followed by opthalmology, Dr. Uribe     Hyperkalemia 2/28/2017    Hyperlipidemia     Hypogonadism male     Hypogonadism male     Keloid cicatrix     Mixed anxiety and depressive disorder     Morbid obesity     Obstructive sleep apnea on CPAP     CPAP    Osteoarthritis of left knee 7/25/2012    Paroxysmal atrial fibrillation 7/6/2016    Primary osteoarthritis of left knee 7/25/2012    Prominent aorta 1/25/2016    "RESULTS: THE HEART IS MILDLY ENLARGED WITH A SLIGHTLY PROMINENT AORTA" - Xray Chest PA & Lateral 12-     Prostate cancer 2/15/2016    - followed by urology, Dr. Young  "    Prostate cancer 2/15/2016    - followed by urology, Dr. 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    chest and forehead    Squamous cell cancer of skin of nose     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 7/6/2016    Type III open fracture of left tibia and fibula with routine healing 7/6/2016    Vitamin D deficiency disease     Vitreous detachment 3/18/2016       Past Surgical History:   Procedure Laterality Date    ADJACENT TISSUE TRANSFER Left 11/30/2020    Procedure: ADJACENT TISSUE TRANSFER x2 ;  Surgeon: Teresa Cooper MD;  Location: [facility] 1ST FLR;  Service: Ophthalmology;  Laterality: Left;  Left glabellar 8.5x11x3 and Left upper eyelid advancement flap [id number]      Cardiac stenting x2      CATARACT EXTRACTION W/  INTRAOCULAR LENS IMPLANT Right 3/29/2016    Dr. Conteh    CATARACT EXTRACTION W/  INTRAOCULAR LENS IMPLANT Left 4/12/2016        COLONOSCOPY N/A 7/23/2020    Procedure: COLONOSCOPY;  Surgeon: Nico Lackey MD;  Location: [facility] (2ND FLR);  Service: Endoscopy;  Laterality: N/A;    DIRECT LARYNGOSCOPY N/A 5/4/2022    Procedure: LARYNGOSCOPY, DIRECT possible biopsy;  Surgeon: Etta Higgins MD;  Location: [facility] OR;  Service: ENT;  Laterality: N/A;  RN PREOP ON 4/29/22. NEEDS T&S DAY OF SURGERY--NF  CONSENTS DAY OF SURGERY    DISSECTION OF NECK Right 5/4/2022    Procedure: RADICAL NECK DISSECTION;  Surgeon: Etta Higgins MD;  Location: [facility] OR;  Service: ENT;  Laterality: Right;  TO FOLLOW DR ALMAZAN    ESOPHAGOGASTRODUODENOSCOPY N/A 7/23/2020    Procedure: EGD (ESOPHAGOGASTRODUODENOSCOPY);  Surgeon: Nico Lackey MD;  Location: [facility] (2ND FLR);  Service: Endoscopy;   "Laterality: N/A;  per Dr Lackey-Will proceed with EGD and colonoscopy on the 2nd floor due to his comorbidities including obesity, sleep apnea, restrictive lung disease, coronary artery disease.  has loop recorder      ok to hold Eliquis 2 days per Dr Sandhu-must remain    EXTERNAL FIXATION TIBIAL FRACTURE Left 07/01/2016    INSERTION OF IMPLANTABLE LOOP RECORDER  04/07/2017    LEFT HEART CATHETERIZATION Left 1/30/2020    Procedure: Left heart cath;  Surgeon: Benjamin Sandhu MD;  Location: [facility] CATH LAB;  Service: Cardiology;  Laterality: Left;  RN PREOP 1/28/20--Pt starting Plavix loading dose today (8pills)- Dr. Sandhu aware.  Pt has a bandaged "non healing area to LLE"--Dr. Sandhu aware.    LEFT HEART CATHETERIZATION Left 2/14/2020    Procedure: Left heart cath, IVUS guided left main / LAD PCI. Noon start, radial approach;  Surgeon: Miguel Angel Brady MD;  Location: [facility] CATH LAB;  Service: Cardiology;  Laterality: Left;  RN PRE OP 2-7-2020  BMI--45.61    ORIF TIBIA FRACTURE Left 07/15/2016    PROBING OF NASOLACRIMAL DUCT WITH INSERTION OF TUBE Left 11/30/2020    Procedure: PROBING, NASOLACRIMAL DUCT,;  Surgeon: Teresa Cooper MD;  Location: [facility] OR [address] [place] [address];  Service: Ophthalmology;  Laterality: Left;    RADIOACTIVE SEED IMPLANTATION OF PROSTATE N/A 8/8/2018    Procedure: INSERTION, RADIOACTIVE SEED, PROSTATE;  Surgeon: Bipin Thompson MD;  Location: [facility] OR [address] [place] [address];  Service: Urology;  Laterality: N/A;  1 hour    SKIN BIOPSY      Squamous cell cancer removal x3 with Mohs surgery      TONSILLECTOMY      TOTAL KNEE ARTHROPLASTY  10/2012    TRIGGER FINGER RELEASE Right 10/14/2020    Procedure: RELEASE, TRIGGER FINGER - right;  Surgeon: Rad Swift MD;  Location: [facility] OR;  Service: Orthopedics;  Laterality: Right;    trus/bx      ULTRASOUND GUIDANCE  2/14/2020    Procedure: Ultrasound Guidance;  Surgeon: Miguel Angel Brady MD;  Location: [facility] CATH LAB;  Service: Cardiology;;       Review of patient's " allergies indicates:   Allergen Reactions    Ciprofloxacin Rash     Diffuse pruritic morbilliform rash developed 3/15/2017 after dose of cipro; previously in 2/2017 he had rash/fevers after initiation of cipro    Zosyn [piperacillin-tazobactam] Rash     Diffuse pruritic morbilliform rash developed 3/15/2017.  Then, 430am dose on 3/16 and rash worsened with SOB/tachypnea but no hypoxemia.     Bacitracin Itching and Rash     Violaceous rash in area of topical Tx.        Family History       Problem Relation (Age of Onset)    Alzheimer's disease Mother    Cancer Father, Sister, Brother    Hypertension Mother    Lung cancer Father    No Known Problems Sister, Maternal Aunt, Maternal Uncle, Paternal Aunt, Paternal Uncle, Maternal Grandmother, Maternal Grandfather, Paternal Grandmother, Paternal Grandfather    Peripheral vascular disease     Skin cancer Father          Tobacco Use    Smoking status: Never Smoker    Smokeless tobacco: Never Used   Substance and Sexual Activity    Alcohol use: Yes     Alcohol/week: 2.0 standard drinks     Types: 2 Cans of beer per week     Comment: occasionally, beer    Drug use: Never    Sexual activity: Yes     Partners: Female     Birth control/protection: None         Review of Systems   Constitutional:  Positive for activity change and fatigue. Negative for appetite change and fever.   HENT:  Positive for trouble swallowing and voice change. Negative for congestion and postnasal drip.    Eyes: Negative.    Respiratory:  Positive for apnea, cough, shortness of breath and wheezing. Negative for choking.    Cardiovascular:  Negative for chest pain and leg swelling.   Gastrointestinal:  Negative for abdominal distention, abdominal pain, constipation and diarrhea.   Endocrine: Negative.    Genitourinary: Negative.    Musculoskeletal: Negative.    Skin: Negative.    Allergic/Immunologic: Negative.    Neurological: Negative.    Hematological: Negative.    Psychiatric/Behavioral:  Negative.     Objective:     Vital Signs (Most Recent):  Temp: 96.5 °F (35.8 °C) (05/10/22 1205)  Pulse: 74 (05/10/22 1232)  Resp: 18 (05/10/22 1232)  BP: 128/61 (05/10/22 1205)  SpO2: 96 % (05/10/22 1300) Vital Signs (24h Range):  Temp:  [96.5 °F (35.8 °C)-97.8 °F (36.6 °C)] 96.5 °F (35.8 °C)  Pulse:  [58-85] 74  Resp:  [17-30] 18  SpO2:  [95 %-100 %] 96 %  BP: (128-157)/(58-70) 128/61     Weight: (!) 158.8 kg (350 lb)  Body mass index is 46.18 kg/m².      Intake/Output Summary (Last 24 hours) at 5/10/2022 1539  Last data filed at 5/10/2022 1215  Gross per 24 hour   Intake 1200 ml   Output 2250 ml   Net -1050 ml       Physical Exam  Constitutional:       General: He is not in acute distress.     Appearance: Normal appearance. He is obese. He is not ill-appearing, toxic-appearing or diaphoretic.   HENT:      Head: Normocephalic.      Nose: Nose normal.      Mouth/Throat:      Mouth: Mucous membranes are moist.   Eyes:      General: No scleral icterus.     Extraocular Movements: Extraocular movements intact.      Pupils: Pupils are equal, round, and reactive to light.   Neck:      Comments: Right radical dissection with clean and dry incision held together with staples.  Healing well.  VENU drain present with serosanguinous fluid present.  Cardiovascular:      Rate and Rhythm: Normal rate and regular rhythm.      Heart sounds: No murmur heard.    No friction rub. No gallop.   Pulmonary:      Effort: Pulmonary effort is normal. No respiratory distress.      Breath sounds: No wheezing.      Comments: Creased LLL air sounds.  No wheezing, no crackles.  Abdominal:      General: Abdomen is flat. Bowel sounds are normal. There is no distension.      Palpations: Abdomen is soft.      Tenderness: There is no abdominal tenderness. There is no guarding.   Musculoskeletal:         General: Normal range of motion.      Comments: Race bilateral edema   Skin:     General: Skin is warm.      Capillary Refill: Capillary refill takes  less than 2 seconds.   Neurological:      General: No focal deficit present.      Mental Status: He is alert and oriented to person, place, and time. Mental status is at baseline.   Psychiatric:         Mood and Affect: Mood normal.         Behavior: Behavior normal.         Thought Content: Thought content normal.         Judgment: Judgment normal.   All pertinent labs within the past 24 hours have been reviewed.    Vents:  Oxygen Concentration (%): 40 (05/10/22 0421)    Lines/Drains/Airways       Drain  Duration                  Closed/Suction Drain 05/04/22 1535 Right Neck Bulb 10 Fr. 6 days              Peripheral Intravenous Line  Duration                  Peripheral IV - Single Lumen 05/04/22 0810 20 G Left;Posterior Forearm 6 days                    Significant Labs:    CBC/Anemia Profile:  No results for input(s): WBC, HGB, HCT, PLT, MCV, RDW, IRON, FERRITIN, RETIC, FOLATE, [id number], OCCULTBLOOD in the last 48 hours.     Chemistries:  No results for input(s): NA, K, CL, CO2, BUN, CREATININE, CALCIUM, ALBUMIN, PROT, BILITOT, ALKPHOS, ALT, AST, GLUCOSE, MG, PHOS in the last 48 hours.    All pertinent labs within the past 24 hours have been reviewed.    Significant Imaging:   I have reviewed all pertinent imaging results/findings within the past 24 hours.

## 2022-05-10 NOTE — ASSESSMENT & PLAN NOTE
Ensure he has home O2 with concentrator, attachment for CPAP, and portable tanks before discharge.  - I am concerned that he may be experiencing aspiration events that explain his wheezing and worsening hypoxia during meals.  I recommend Speech therapy consult for further evaluation.  He is high risk for aspiration due to recent surgery, endotracheal intubation. Recommend this before discharge.  - if speech work up is unrevealing, would repeat CT thorax  - ensure continued PT/OT, IS, acapella valve, progressive mobility, nightly CPAP, daily out of bed to chair, and minimization of narcotic use.  - low risk for PE given use of eliquis. Continue.  - reasonable to continue nebulizer therapy at home, if patient perceives a benefit from this treatment.

## 2022-05-10 NOTE — SUBJECTIVE & OBJECTIVE
Interval History: sitting in chair eating lunch. Daughter and wife at bedside. Walked around today and did better with breathing, but daughter concerned for wheezing when pushing air out. Otherwise doing okay. Pulmonology consulted.    Review of Systems   Constitutional:  Negative for chills and fever.   Respiratory:  Positive for shortness of breath and wheezing. Negative for cough.    Cardiovascular:  Negative for chest pain, palpitations and leg swelling.   Gastrointestinal:  Negative for abdominal distention, abdominal pain, diarrhea, nausea and vomiting.   Genitourinary:  Negative for difficulty urinating.   Neurological:  Negative for weakness and numbness.   Psychiatric/Behavioral:  Negative for confusion.    Objective:     Vital Signs (Most Recent):  Temp: 96.5 °F (35.8 °C) (05/10/22 1205)  Pulse: 74 (05/10/22 1232)  Resp: 18 (05/10/22 1232)  BP: 128/61 (05/10/22 1205)  SpO2: 96 % (05/10/22 1300)   Vital Signs (24h Range):  Temp:  [96.5 °F (35.8 °C)-97.8 °F (36.6 °C)] 96.5 °F (35.8 °C)  Pulse:  [58-85] 74  Resp:  [17-30] 18  SpO2:  [95 %-100 %] 96 %  BP: (128-157)/(58-70) 128/61     Weight: (!) 158.8 kg (350 lb)  Body mass index is 46.18 kg/m².    Intake/Output Summary (Last 24 hours) at 5/10/2022 1618  Last data filed at 5/10/2022 1614  Gross per 24 hour   Intake 1200 ml   Output 2560 ml   Net -1360 ml        Physical Exam  Vitals and nursing note reviewed.   Constitutional:       General: He is not in acute distress.     Appearance: He is obese. He is not toxic-appearing.   HENT:      Head: Normocephalic and atraumatic.      Nose: Nose normal.      Mouth/Throat:      Mouth: Mucous membranes are moist.   Neck:      Comments: Neck incision staples and drain in place  Cardiovascular:      Rate and Rhythm: Normal rate and regular rhythm.      Pulses: Normal pulses.      Heart sounds: Normal heart sounds. No murmur heard.    No gallop.   Pulmonary:      Effort: Pulmonary effort is normal. No respiratory  distress.      Breath sounds: No wheezing or rales.      Comments: 4L NC  Abdominal:      General: Bowel sounds are normal. There is distension.      Palpations: Abdomen is soft.      Tenderness: There is no abdominal tenderness. There is no guarding.   Musculoskeletal:      Right lower leg: No edema.      Left lower leg: No edema.   Skin:     General: Skin is warm and dry.   Neurological:      Mental Status: He is alert and oriented to person, place, and time.       Significant Labs: All pertinent labs within the past 24 hours have been reviewed.    Significant Imaging: I have reviewed all pertinent imaging results/findings within the past 24 hours.

## 2022-05-10 NOTE — PT/OT/SLP PROGRESS
Occupational Therapy   Treatment    Name: Janusz Montgomery Jr.  MRN: [id number]  Admitting Diagnosis:  Head and neck cancer  6 Days Post-Op    Recommendations:     Discharge Recommendations: outpatient OT, outpatient PT  Discharge Equipment Recommendations:  oxygen  Barriers to discharge:  None    Assessment:     Janusz Montgomery Jr. is a 77 y.o. male with a medical diagnosis of Head and neck cancer.  Performance deficits affecting function are weakness, impaired endurance, impaired self care skills, gait instability, impaired functional mobilty, decreased lower extremity function, decreased ROM, impaired skin, edema, impaired cardiopulmonary response to activity.     Pt very pleasant and willing to participate in tx session this date; pt was able to ambulate household/community distances w/ SBA and use of RW and 4L O2 in tow, SPO2 87-90% throughout but recovered to 95% w/ rest. Pt was able to statically stand ~6 min for performing BUE TE, SPO2 90% throughout on 4L. Pt tolerated tx session well and will continue to benefit from skilled acute OT services to maximize functional capacity for safe performance w/ ADLs and functional mobility.     Rehab Prognosis:  Good; patient would benefit from acute skilled OT services to address these deficits and reach maximum level of function.       Plan:     Patient to be seen 3 x/week, 5 x/week to address the above listed problems via self-care/home management, therapeutic exercises, therapeutic activities  · Plan of Care Expires: 05/19/22  · Plan of Care Reviewed with: patient    Subjective     Pain/Comfort:  · Pain Rating 1: 0/10  · Pain Rating Post-Intervention 1: 0/10    Objective:     Communicated with: Nurse prior to session.  Patient found HOB elevated with oxygen, telemetry, VENU drain, pulse ox (continuous) upon OT entry to room.    General Precautions: Standard, fall, respiratory   Orthopedic Precautions:N/A   Braces: N/A  Respiratory Status: Nasal cannula, flow 4 L/min      Occupational Performance:     Bed Mobility:    · Patient found/left in chair     Functional Mobility/Transfers:  · Patient completed Sit <> Stand Transfer x 2 trials from EOB with stand by assistance  with  rolling walker   · Functional Mobility: Pt was able to ambulate household/community distances throughout nursing unit w/ SBA and use RW. Pt w/ SOB but was able to tolerated w/ PLB; SPO2 87-90% throughout w/ HR in low 110s.     Activities of Daily Living:  · Upper Body Dressing: minimum assistance for donning new gown over front and back in standing w/ SBA for balance    [facility] 6 Click ADL: 24    Treatment & Education:  -Pt performed ADL and functional mobility as noted above.   -Pt educated on safe functional mobility w/ emphasis on energy conservation and PLB for preventing over-exertion.   -Pt performed the following BUE TE in standing, facing wall, w/ use of towel to increased overall mobility, strength and endurance in BUEs, w/ regard to overall standing tolerance/endurance; pt was able to tolerated ~6 min of static standing for performing the following for 1 set x 20 reps:    -shoulder flexion   -overhead horizontal abd/add   -clockwise circles    -counter clockwise circles   -Pt used unilateral support on RW for stability as needed.   -Pt was able to stop in between each set for catching breath and implementing PLB.   -Questions and concerns addressed within OT scope.     Patient left up in chair with all lines intact, call button in reach, nurse notified and family presentEducation:      GOALS:   Multidisciplinary Problems     Occupational Therapy Goals        Problem: Occupational Therapy    Goal Priority Disciplines Outcome Interventions   Occupational Therapy Goal     OT, PT/OT Ongoing, Progressing    Description: Goals to be met by: 5/19/22     Patient will increase functional independence with ADLs by performing:    LE Dressing with Modified [place].  Grooming while standing at sink with  Modified [place].  Toileting from toilet with Modified [place] for hygiene and clothing management.   Supine to sit with Modified [place].  Step transfer with Modified [place]  Toilet transfer to toilet with Modified [place].  Upper extremity exercise program x15 reps per handout, with independence.  Implementation of PLB and energy conservation strategies into daily routines w/ (I).                      Time Tracking:     OT Date of Treatment: 05/10/22  OT Start Time: 1118  OT Stop Time: 1146  OT Total Time (min): 28 min    Billable Minutes:Therapeutic Activity 15  Therapeutic Exercise 13  Total Time 28      OT/COSTA: OT          5/10/2022

## 2022-05-10 NOTE — CONSULTS
[facility] Surg  Pulmonology  Consult Note    Patient Name: Janusz Montgomery Jr.  MRN: [id number]  Admission Date: 5/4/2022  Hospital Length of Stay: 6 days  Code Status: Full Code  Attending Physician: Etta Higgins MD  Primary Care Provider: Miguelina Weathers MD   Principal Problem: Head and neck cancer    [unfilled]  Subjective:     HPI:  Janusz Montgomery is a 77 year old male with asbestos exposure (calcified pleural plaques and chronic loculated small left pleural effusion), HFpEF, CAD (s/p 3 stents), PAF (on eliquis), CKD, basal cell carcinoma, prostate cancer (brachytherapy in 8/2018), GERD, CHARISMA on CPAP (compliant with treatment), basal cell carcinoma, chronic dyspnea on exertion, morbid obesity, and currently admitted with head and neck SCC for radical neck dissection on the right level 2-5 with sacrifice of portion of SCM, Internal jugular vein and accessory nerve due to tumor invasion on 5/4/2022.    He had a distant exposure to asbestos in high school.  He is a never smoker.  Formerly prescribed breo, but this didn't help him so it was discontinued.  PFTs performed 2/2022 showed restriction (TLC 53%, FVC 62%), and reduced DLCO (50%).  Currently requiring 4-5 LPM via NC to maintain saturations.  He states his biggest issue at this time is the acute onset of wheezing, and shortness of breath that has occurred while eating.  He was diagnosed as requiring supplemental O2 in February and has had difficulty obtaining his equipment.  I suspect he has been hypoxic and requires O2 for quite some time.  He has been aggressively diuresed during this stay, and has normal BNP.  He states his breathing is improving daily, and he was able to ambulate throughout the unit today with PT.     CT Chest 2/2020:  Stable disease since 2019.  LLL loculated pleural effusion.  Bilateral calcified pleural plaques.        Past Medical History:   Diagnosis Date    (HFpEF) heart failure with preserved ejection fraction 2/4/2022  "   NAT (acute kidney injury) 3/19/2017    ALLERGIC RHINITIS     Anemia     Anxiety     Basal cell carcinoma 10/19/2018    forehead and right medial shoulder    Basal cell carcinoma 01/09/2019    left nasal bridge and left posterior ear    Basal cell carcinoma 06/12/2020    left lower medial leg - Efudex    Basal cell carcinoma 11/30/2020    left medial upper eyelid canthus superficial BCC    Chronic rhinitis 5/3/2013    Chronic rhinitis 5/3/2013    Coronary artery disease involving native coronary artery of native heart without angina pectoris s/p RCA stent     Cortical cataract of both eyes 3/18/2016    Delayed sleep phase syndrome 3/13/2019    Depression     Erectile dysfunction 3/24/2014    Erectile dysfunction 3/24/2014    Essential hypertension     GERD (gastroesophageal reflux disease) 7/25/2012    Gout, arthritis     Grade III open fracture of left tibia and fibula s/p ex-fix on 7/1/16 and ORIF of left tibia on 7/15 7/6/2016    H/O: iritis     Helicobacter pylori (H. pylori) infection     Treated    Hepatitis     Herpes simplex keratoconjunctivitis 9/30/2015    - on acyclovir - followed by opthalmology, Dr. Uribe     Herpes simplex keratoconjunctivitis 9/30/2015    - on acyclovir - followed by opthalmology, Dr. Uribe     Hyperkalemia 2/28/2017    Hyperlipidemia     Hypogonadism male     Hypogonadism male     Keloid cicatrix     Mixed anxiety and depressive disorder     Morbid obesity     Obstructive sleep apnea on CPAP     CPAP    Osteoarthritis of left knee 7/25/2012    Paroxysmal atrial fibrillation 7/6/2016    Primary osteoarthritis of left knee 7/25/2012    Prominent aorta 1/25/2016    "RESULTS: THE HEART IS MILDLY ENLARGED WITH A SLIGHTLY PROMINENT AORTA" - Xray Chest PA & Lateral 12-     Prostate cancer 2/15/2016    - followed by urology, Dr. Young     Prostate cancer 2/15/2016    - followed by urology, Dr. Young     PVD (peripheral vascular " disease)     Refractive error 3/18/2016    Respiratory failure, unspecified with hypoxia 2/4/2022    SCC (squamous cell carcinoma) 06/29/2021    R trap    Skin disease     Skin ulcer     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    chest and forehead    Squamous cell cancer of skin of nose     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 7/6/2016    Type III open fracture of left tibia and fibula with routine healing 7/6/2016    Vitamin D deficiency disease     Vitreous detachment 3/18/2016       Past Surgical History:   Procedure Laterality Date    ADJACENT TISSUE TRANSFER Left 11/30/2020    Procedure: ADJACENT TISSUE TRANSFER x2 ;  Surgeon: Teresa Cooper MD;  Location: [facility] OR 1ST FLR;  Service: Ophthalmology;  Laterality: Left;  Left glabellar 8.5x11x3 and Left upper eyelid advancement flap [id number]      Cardiac stenting x2      CATARACT EXTRACTION W/  INTRAOCULAR LENS IMPLANT Right 3/29/2016    Dr. Conteh    CATARACT EXTRACTION W/  INTRAOCULAR LENS IMPLANT Left 4/12/2016        COLONOSCOPY N/A 7/23/2020    Procedure: COLONOSCOPY;  Surgeon: Nico Lackey MD;  Location: [facility] (2ND FLR);  Service: Endoscopy;  Laterality: N/A;    DIRECT LARYNGOSCOPY N/A 5/4/2022    Procedure: LARYNGOSCOPY, DIRECT possible biopsy;  Surgeon: Etta Higgins MD;  Location: [facility] OR;  Service: ENT;  Laterality: N/A;  RN PREOP ON 4/29/22. NEEDS T&S DAY OF SURGERY--NF  CONSENTS DAY OF SURGERY    DISSECTION OF NECK Right 5/4/2022    Procedure: RADICAL NECK DISSECTION;  Surgeon: Etta Higgins MD;  Location: [facility] OR;  Service: ENT;  Laterality: Right;  TO FOLLOW DR ALMAZAN    ESOPHAGOGASTRODUODENOSCOPY N/A 7/23/2020    Procedure: EGD (ESOPHAGOGASTRODUODENOSCOPY);  Surgeon: Nico Lackey MD;  Location: [facility] (2ND FLR);  Service: Endoscopy;  Laterality: N/A;  per Dr Lackey-Will proceed with EGD and colonoscopy on the 2nd floor due to his  "comorbidities including obesity, sleep apnea, restrictive lung disease, coronary artery disease.  has loop recorder      ok to hold Eliquis 2 days per Dr Sandhu-must remain    EXTERNAL FIXATION TIBIAL FRACTURE Left 07/01/2016    INSERTION OF IMPLANTABLE LOOP RECORDER  04/07/2017    LEFT HEART CATHETERIZATION Left 1/30/2020    Procedure: Left heart cath;  Surgeon: Benjamin Sandhu MD;  Location: [facility] CATH LAB;  Service: Cardiology;  Laterality: Left;  RN PREOP 1/28/20--Pt starting Plavix loading dose today (8pills)- Dr. Sandhu aware.  Pt has a bandaged "non healing area to LLE"--Dr. Sandhu aware.    LEFT HEART CATHETERIZATION Left 2/14/2020    Procedure: Left heart cath, IVUS guided left main / LAD PCI. Noon start, radial approach;  Surgeon: Miguel Angel Brady MD;  Location: [facility] CATH LAB;  Service: Cardiology;  Laterality: Left;  RN PRE OP 2-7-2020  BMI--45.61    ORIF TIBIA FRACTURE Left 07/15/2016    PROBING OF NASOLACRIMAL DUCT WITH INSERTION OF TUBE Left 11/30/2020    Procedure: PROBING, NASOLACRIMAL DUCT,;  Surgeon: Teresa Cooper MD;  Location: [facility] OR [address] [place] [address];  Service: Ophthalmology;  Laterality: Left;    RADIOACTIVE SEED IMPLANTATION OF PROSTATE N/A 8/8/2018    Procedure: INSERTION, RADIOACTIVE SEED, PROSTATE;  Surgeon: Bipin Thompson MD;  Location: [facility] OR [address] [place] [address];  Service: Urology;  Laterality: N/A;  1 hour    SKIN BIOPSY      Squamous cell cancer removal x3 with Mohs surgery      TONSILLECTOMY      TOTAL KNEE ARTHROPLASTY  10/2012    TRIGGER FINGER RELEASE Right 10/14/2020    Procedure: RELEASE, TRIGGER FINGER - right;  Surgeon: Rad Swift MD;  Location: [facility] OR;  Service: Orthopedics;  Laterality: Right;    trus/bx      ULTRASOUND GUIDANCE  2/14/2020    Procedure: Ultrasound Guidance;  Surgeon: Miguel Angel Brady MD;  Location: [facility] CATH LAB;  Service: Cardiology;;       Review of patient's allergies indicates:   Allergen Reactions    Ciprofloxacin Rash     Diffuse pruritic morbilliform " rash developed 3/15/2017 after dose of cipro; previously in 2/2017 he had rash/fevers after initiation of cipro    Zosyn [piperacillin-tazobactam] Rash     Diffuse pruritic morbilliform rash developed 3/15/2017.  Then, 430am dose on 3/16 and rash worsened with SOB/tachypnea but no hypoxemia.     Bacitracin Itching and Rash     Violaceous rash in area of topical Tx.        Family History       Problem Relation (Age of Onset)    Alzheimer's disease Mother    Cancer Father, Sister, Brother    Hypertension Mother    Lung cancer Father    No Known Problems Sister, Maternal Aunt, Maternal Uncle, Paternal Aunt, Paternal Uncle, Maternal Grandmother, Maternal Grandfather, Paternal Grandmother, Paternal Grandfather    Peripheral vascular disease     Skin cancer Father          Tobacco Use    Smoking status: Never Smoker    Smokeless tobacco: Never Used   Substance and Sexual Activity    Alcohol use: Yes     Alcohol/week: 2.0 standard drinks     Types: 2 Cans of beer per week     Comment: occasionally, beer    Drug use: Never    Sexual activity: Yes     Partners: Female     Birth control/protection: None         Review of Systems   Constitutional:  Positive for activity change and fatigue. Negative for appetite change and fever.   HENT:  Positive for trouble swallowing and voice change. Negative for congestion and postnasal drip.    Eyes: Negative.    Respiratory:  Positive for apnea, cough, shortness of breath and wheezing. Negative for choking.    Cardiovascular:  Negative for chest pain and leg swelling.   Gastrointestinal:  Negative for abdominal distention, abdominal pain, constipation and diarrhea.   Endocrine: Negative.    Genitourinary: Negative.    Musculoskeletal: Negative.    Skin: Negative.    Allergic/Immunologic: Negative.    Neurological: Negative.    Hematological: Negative.    Psychiatric/Behavioral: Negative.     Objective:     Vital Signs (Most Recent):  Temp: 96.5 °F (35.8 °C) (05/10/22 1205)  Pulse:  74 (05/10/22 1232)  Resp: 18 (05/10/22 1232)  BP: 128/61 (05/10/22 1205)  SpO2: 96 % (05/10/22 1300) Vital Signs (24h Range):  Temp:  [96.5 °F (35.8 °C)-97.8 °F (36.6 °C)] 96.5 °F (35.8 °C)  Pulse:  [58-85] 74  Resp:  [17-30] 18  SpO2:  [95 %-100 %] 96 %  BP: (128-157)/(58-70) 128/61     Weight: (!) 158.8 kg (350 lb)  Body mass index is 46.18 kg/m².      Intake/Output Summary (Last 24 hours) at 5/10/2022 1539  Last data filed at 5/10/2022 1215  Gross per 24 hour   Intake 1200 ml   Output 2250 ml   Net -1050 ml       Physical Exam  Constitutional:       General: He is not in acute distress.     Appearance: Normal appearance. He is obese. He is not ill-appearing, toxic-appearing or diaphoretic.   HENT:      Head: Normocephalic.      Nose: Nose normal.      Mouth/Throat:      Mouth: Mucous membranes are moist.   Eyes:      General: No scleral icterus.     Extraocular Movements: Extraocular movements intact.      Pupils: Pupils are equal, round, and reactive to light.   Neck:      Comments: Right radical dissection with clean and dry incision held together with staples.  Healing well.  VENU drain present with serosanguinous fluid present.  Cardiovascular:      Rate and Rhythm: Normal rate and regular rhythm.      Heart sounds: No murmur heard.    No friction rub. No gallop.   Pulmonary:      Effort: Pulmonary effort is normal. No respiratory distress.      Breath sounds: No wheezing.      Comments: Creased LLL air sounds.  No wheezing, no crackles.  Abdominal:      General: Abdomen is flat. Bowel sounds are normal. There is no distension.      Palpations: Abdomen is soft.      Tenderness: There is no abdominal tenderness. There is no guarding.   Musculoskeletal:         General: Normal range of motion.      Comments: Race bilateral edema   Skin:     General: Skin is warm.      Capillary Refill: Capillary refill takes less than 2 seconds.   Neurological:      General: No focal deficit present.      Mental Status: He is  alert and oriented to person, place, and time. Mental status is at baseline.   Psychiatric:         Mood and Affect: Mood normal.         Behavior: Behavior normal.         Thought Content: Thought content normal.         Judgment: Judgment normal.   All pertinent labs within the past 24 hours have been reviewed.    Vents:  Oxygen Concentration (%): 40 (05/10/22 0421)    Lines/Drains/Airways       Drain  Duration                  Closed/Suction Drain 05/04/22 1535 Right Neck Bulb 10 Fr. 6 days              Peripheral Intravenous Line  Duration                  Peripheral IV - Single Lumen 05/04/22 0810 20 G Left;Posterior Forearm 6 days                    Significant Labs:    CBC/Anemia Profile:  No results for input(s): WBC, HGB, HCT, PLT, MCV, RDW, IRON, FERRITIN, RETIC, FOLATE, [id number], OCCULTBLOOD in the last 48 hours.     Chemistries:  No results for input(s): NA, K, CL, CO2, BUN, CREATININE, CALCIUM, ALBUMIN, PROT, BILITOT, ALKPHOS, ALT, AST, GLUCOSE, MG, PHOS in the last 48 hours.    All pertinent labs within the past 24 hours have been reviewed.    Significant Imaging:   I have reviewed all pertinent imaging results/findings within the past 24 hours.      ABG  No results for input(s): PH, PO2, PCO2, HCO3, BE in the last 168 hours.  Assessment/Plan:     * Head and neck cancer  Per ENT.      Restrictive lung disease  Counseled on weight loss.  Continue to follow outpatient with pulm  Also contribution from asbestosis.  Monitor for progression, or increase in pleural fluid.  If this arises, may need thoracentesis.    Chronic respiratory failure with hypoxia  Ensure he has home O2 with concentrator, attachment for CPAP, and portable tanks before discharge.  - I am concerned that he may be experiencing aspiration events that explain his wheezing and worsening hypoxia during meals.  I recommend Speech therapy consult for further evaluation.  He is high risk for aspiration due to recent surgery, endotracheal  intubation. Recommend this before discharge.  - if speech work up is unrevealing, would repeat CT thorax  - ensure continued PT/OT, IS, acapella valve, progressive mobility, nightly CPAP, daily out of bed to chair, and minimization of narcotic use.  - low risk for PE given use of eliquis. Continue.  - reasonable to continue nebulizer therapy at home, if patient perceives a benefit from this treatment.      Chronic heart failure with preserved ejection fraction  Continue to ensure euvolemia.  Continue diuresis as written    Asbestosis  Recent evaluation in February shows no interval change since 2019.    - if symptoms persist, would repeat CT thorax, otherwise, repeat as per outpatient pulm recs.    Morbid obesity with body mass index of 45.0-49.9 in adult  Counseled on weight loss and the impact this has on his CHARISMA and shortness of breath.  He understands and is motivated to work on this as possible.    GERD (gastroesophageal reflux disease)  PPI    CHARISMA on CPAP  Ensure to continue CPAP use as prescribed.  Follow up with Dr. Loredo as directed.          Thank you for your consult. I will follow-up with patient. Please contact us if you have any additional questions.     Fredo Manrique MD  Pulmonology  [facility] [place] - Med Surg

## 2022-05-10 NOTE — PLAN OF CARE
Problem: Occupational Therapy  Goal: Occupational Therapy Goal  Description: Goals to be met by: 5/19/22     Patient will increase functional independence with ADLs by performing:    LE Dressing with Modified [place].  Grooming while standing at sink with Modified [place].  Toileting from toilet with Modified [place] for hygiene and clothing management.   Supine to sit with Modified [place].  Step transfer with Modified [place]  Toilet transfer to toilet with Modified [place].  Upper extremity exercise program x15 reps per handout, with independence.  Implementation of PLB and energy conservation strategies into daily routines w/ (I).     Outcome: Ongoing, Progressing     Pt very pleasant and willing to participate in tx session this date; pt was able to ambulate household/community distances w/ SBA and use of RW and 4L O2 in tow, SPO2 87-90% throughout but recovered to 95% w/ rest. Pt was able to statically stand ~6 min for performing BUE TE, SPO2 90% throughout on 4L. Pt tolerated tx session well and will continue to benefit from skilled acute OT services to maximize functional capacity for safe performance w/ ADLs and functional mobility.

## 2022-05-10 NOTE — ASSESSMENT & PLAN NOTE
Counseled on weight loss and the impact this has on his CHARISMA and shortness of breath.  He understands and is motivated to work on this as possible.

## 2022-05-10 NOTE — ASSESSMENT & PLAN NOTE
Patient admitted with Hypoxic which is Chronic.  he is not on home oxygen because it was never delivered. Signs/symptoms of respiratory failure include- increased work of breathing, wheezing, dyspnea on exertion present on admission and still present with ambulation  - Labs and images were reviewed.  - Supplemental oxygen was provided and noted- Nasal Cannula 4 LPM and APAP QHS  - Respiratory failure is due to- CHF, Obesity Hypoventilation and restrictive lung disease, morbid obesity, pulmonary hypertension  - O2 ordered for home  - already has APAP at home  - neb ordered for home- need to make sure he has duoneb Rx too  - continue home lasix  - no signs of pneumonia.   - continue nebs, mucinex, acapella  - breathing appears improved  - Pulmonary consulted today

## 2022-05-10 NOTE — PROGRESS NOTES
[facility]  Progress Note    Patient Name: Janusz Montgomery Jr.  MRN: [id number]  Patient Class: IP- Inpatient   Admission Date: 5/4/2022  Length of Stay: 6 days  Attending Physician: Etta Higgins MD  Primary Care Provider: Miguelina Weathers MD        Subjective:     Principal Problem:Head and neck cancer        HPI:  Mr Janusz Montgomery Jr. is a 77 y.o. man with CAD, HFpEF, CHARISMA on APAP, asbestosis, and head/neck SCC admitted to ENT. He is s/p RADICAL NECK DISSECTION (Right) Level 2-5 with sacrifice of portion of SCM, internal jugular vein and accessory nerve due to tumor invasion on 5/4/2022. Hospital medicine consulted for shortness of breath.     Patient states he has had intermittent shortness of breath and dyspnea on exertion with lower extremity edema for 2 years. He has a history of CAD s/p stenting and diastolic CHF with last TTE 2022 EF 60% on home lasix. 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 He has PFTs from 2020 with moderate restriction. He has CHARISMA on APAP 14-16 at home. He states he was previously tested for home oxygen and was supposed to receive it but did not. He has had a couple episodes of shortness of breath/dyspnea on exertion since admission after surgery. He has a cough productive of sputum. No fever. No chest pain. No leg swelling.        Overview/Hospital Course:  No notes on file    Interval History: sitting in chair eating lunch. Daughter and wife at bedside. Walked around today and did better with breathing, but daughter concerned for wheezing when pushing air out. Otherwise doing okay. Pulmonology consulted.    Review of Systems   Constitutional:  Negative for chills and fever.   Respiratory:  Positive for shortness of breath and wheezing. Negative for cough.    Cardiovascular:  Negative for chest pain, palpitations and leg swelling.   Gastrointestinal:  Negative for  abdominal distention, abdominal pain, diarrhea, nausea and vomiting.   Genitourinary:  Negative for difficulty urinating.   Neurological:  Negative for weakness and numbness.   Psychiatric/Behavioral:  Negative for confusion.    Objective:     Vital Signs (Most Recent):  Temp: 96.5 °F (35.8 °C) (05/10/22 1205)  Pulse: 74 (05/10/22 1232)  Resp: 18 (05/10/22 1232)  BP: 128/61 (05/10/22 1205)  SpO2: 96 % (05/10/22 1300)   Vital Signs (24h Range):  Temp:  [96.5 °F (35.8 °C)-97.8 °F (36.6 °C)] 96.5 °F (35.8 °C)  Pulse:  [58-85] 74  Resp:  [17-30] 18  SpO2:  [95 %-100 %] 96 %  BP: (128-157)/(58-70) 128/61     Weight: (!) 158.8 kg (350 lb)  Body mass index is 46.18 kg/m².    Intake/Output Summary (Last 24 hours) at 5/10/2022 1618  Last data filed at 5/10/2022 1614  Gross per 24 hour   Intake 1200 ml   Output 2560 ml   Net -1360 ml        Physical Exam  Vitals and nursing note reviewed.   Constitutional:       General: He is not in acute distress.     Appearance: He is obese. He is not toxic-appearing.   HENT:      Head: Normocephalic and atraumatic.      Nose: Nose normal.      Mouth/Throat:      Mouth: Mucous membranes are moist.   Neck:      Comments: Neck incision staples and drain in place  Cardiovascular:      Rate and Rhythm: Normal rate and regular rhythm.      Pulses: Normal pulses.      Heart sounds: Normal heart sounds. No murmur heard.    No gallop.   Pulmonary:      Effort: Pulmonary effort is normal. No respiratory distress.      Breath sounds: No wheezing or rales.      Comments: 4L NC  Abdominal:      General: Bowel sounds are normal. There is distension.      Palpations: Abdomen is soft.      Tenderness: There is no abdominal tenderness. There is no guarding.   Musculoskeletal:      Right lower leg: No edema.      Left lower leg: No edema.   Skin:     General: Skin is warm and dry.   Neurological:      Mental Status: He is alert and oriented to person, place, and time.       Significant Labs: All pertinent  "labs within the past 24 hours have been reviewed.    Significant Imaging: I have reviewed all pertinent imaging results/findings within the past 24 hours.      Assessment/Plan:      * Head and neck cancer  S/p dissection - see op note  Management per ENT      Restrictive lung disease  Noted on prior PFTs 2020  See resp failure       Constipation  Bowel regimen ordered  Resolved.     Chronic respiratory failure with hypoxia  Patient admitted with Hypoxic which is Chronic.  he is not on home oxygen because it was never delivered. Signs/symptoms of respiratory failure include- increased work of breathing, wheezing, dyspnea on exertion present on admission and still present with ambulation  - Labs and images were reviewed.  - Supplemental oxygen was provided and noted- Nasal Cannula 4 LPM and APAP QHS  - Respiratory failure is due to- CHF, Obesity Hypoventilation and restrictive lung disease, morbid obesity, pulmonary hypertension  - O2 ordered for home  - already has APAP at home  - neb ordered for home- need to make sure he has duoneb Rx too  - continue home lasix  - no signs of pneumonia.   - continue nebs, mucinex, acapella  - breathing appears improved  - Pulmonary consulted today      Chronic heart failure with preserved ejection fraction  No edema.   BNP may be underestimated due to morbid obesity   Recent Labs   Lab 05/06/22  1311   BNP 46     Last TTE 4/2022 with EF 60%  Continue home lasix      Macrocytic anemia  Noted. Stable post op.       Asbestosis  PFTs 2020 with restrictive lung disease, but Pulmonary note suggests this is more due to morbid obesity. Last CT 2/2022 showing "Symmetric coarse calcified pleural plaques of the bilateral hanh thoraces sparing the mediastinal pleura associated with loculated pleural effusion, subpleural fibrosis and volume loss in the left lower lobe, not significantly changed and most compatible with asbestosis."  - may need repeat PFTs as outpatient   - follow up with " Pulmonary      CKD (chronic kidney disease) stage 3, GFR 30-59 ml/min  Cr 1.5, at baseline      Paroxysmal atrial fibrillation   Patient with Paroxysmal (<7 days) atrial fibrillation which is controlled currently with Beta Blocker. Patient is currently in sinus rhythm.[id number] Score: 3. Anticoagulation indicated. Anticoagulation done with eliquis.        Morbid obesity with body mass index of 45.0-49.9 in adult  Body mass index is 46.18 kg/m². Morbid obesity complicates all aspects of disease management from diagnostic modalities to treatment. Weight loss encouraged and health benefits explained to patient.   - discussed need for weight loss with patient at length         GERD (gastroesophageal reflux disease)  - start on PPI      CHARISMA on CPAP  Follows with Pulmonary  On APAP 14-16 at home  Morbid obesity contributes to CHARISMA      Coronary artery disease involving native coronary artery of native heart without angina pectoris s/p RCA stent  Noted. No angina. Continue home Rx        VTE Risk Mitigation (From admission, onward)         Ordered     apixaban tablet 5 mg  2 times daily         05/06/22 0950                Discharge Planning   KIARA: 5/7/2022     Code Status: Full Code   Is the patient medically ready for discharge?:     Reason for patient still in hospital (select all that apply): Treatment  Discharge Plan A: Home Health   Discharge Delays: None known at this time              Romero Martin MD  Department of Hospital Medicine   [facility] - Med Surg

## 2022-05-11 NOTE — PLAN OF CARE
[facility] - Med Surg      HOME HEALTH ORDERS  FACE TO FACE ENCOUNTER    Patient Name: Janusz Montgomery Jr.  YOB: 1945    PCP: Miguelina Weathers MD   PCP Address: [address] / [place] [address]  PCP Phone Number: [phone]  PCP Fax: [phone]    Encounter Date: 4/27/22    Admit to Home Health    Diagnoses:  Active Hospital Problems    Diagnosis  POA    *Head and neck cancer [C76.0]  Yes    Restrictive lung disease [J98.4]  Yes    Constipation [K59.00]  Yes    Chronic heart failure with preserved ejection fraction [I50.32]  Yes    Chronic respiratory failure with hypoxia [J96.11]  Yes    Macrocytic anemia [D53.9]  Yes     Recommend multivitamin with b complex      Asbestosis [J61]  Yes    CKD (chronic kidney disease) stage 3, GFR 30-59 ml/min [N18.30]  Yes     -followed by nephrology.          Paroxysmal atrial fibrillation  [I48.0]  Yes     - on ASA 325mg daily  - followed by cardiology, Dr. Guardado      Morbid obesity with body mass index of 45.0-49.9 in adult [E66.01, Z68.42]  Not Applicable     -gaining weight despite dietary restriction.        GERD (gastroesophageal reflux disease) [K21.9]  Yes    CHARISMA on CPAP [G47.33, Z99.89]  Not Applicable     -Severe ahi of 29. apap 14-16  -Using and benefiting  -we discussed at length about Respironics recall.  Patient already registered his apap.  Risk and benefits discussed.  Per patient, he had spoken with [employer] and they will replace his machine if order is written.       Coronary artery disease involving native coronary artery of native heart without angina pectoris s/p RCA stent [I25.10]  Yes      Resolved Hospital Problems    Diagnosis Date Resolved POA    Hypoxia [R09.02] 05/07/2022 Yes       Follow Up Appointments:  Future Appointments   Date Time Provider Department Center   5/13/2022  1:45 PM Etta Higgins MD [facility] ENT [facility] Cli   5/25/2022 10:30 AM Sandra Gary DPM [facility] POD [place]   5/31/2022  2:30 PM Pito  DO [facility] PAINMG [place]   6/6/2022  1:40 PM LAB, APPOINTMENT [facility]ED [facility] IM [facility] PCW   6/6/2022  1:50 PM LAB, SPECIMEN [facility]ED [facility] SPLABIM [facility] PCW   6/8/2022  1:00 PM Chapito Loredo MD [facility] PULSM [facility]   6/10/2022 10:00 AM Bianca Kumar MD [facility]ERM [facility]   6/13/2022  2:30 PM Malini Pagan, NP [facility] NEPHRO [facility]   7/11/2022  2:20 PM Miguelina Weathers MD [facility] FAM MED [place]   8/1/2022  2:30 PM Benjamin Sandhu MD [facility] CARDIO [place]       Allergies:  Review of patient's allergies indicates:   Allergen Reactions    Ciprofloxacin Rash     Diffuse pruritic morbilliform rash developed 3/15/2017 after dose of cipro; previously in 2/2017 he had rash/fevers after initiation of cipro    Zosyn [piperacillin-tazobactam] Rash     Diffuse pruritic morbilliform rash developed 3/15/2017.  Then, 430am dose on 3/16 and rash worsened with SOB/tachypnea but no hypoxemia.     Bacitracin Itching and Rash     Violaceous rash in area of topical Tx.        Medications: Review discharge medications with patient and family and provide education.      I have seen and examined this patient within the last 30 days. My clinical findings that support the need for the home health skilled services and home bound status are the following:no   Weakness/numbness causing balance and gait disturbance due to Malignancy/Cancer making it taxing to leave home.     Diet:   2 gram sodium diet    Labs:  n/a    Referrals/ Consults  Aide to provide assistance with personal care, ADLs, and vital signs.    Activities:   activity as tolerated    Nursing:   Agency to admit patient within 24 hours of hospital discharge unless specified on physician order or at patient request    SN to complete comprehensive assessment including routine vital signs. Instruct on disease process and s/s of complications to report to MD. Review/verify medication list sent home with the patient at time of discharge  and  instruct patient/caregiver as needed. Frequency may be adjusted depending on start of care date.     Skilled nurse to perform up to 3 visits PRN for symptoms related to diagnosis    Notify MD if SBP > 160 or < 90; DBP > 90 or < 50; HR > 120 or < 50; Temp > 101; O2 < 88%; Other:       Ok to schedule additional visits based on staff availability and patient request on consecutive days within the home health episode.    Miscellaneous       Home Health Aide:  Nursing     Wound Care Orders  no    I certify that this patient is confined to his home and needs intermittent skilled nursing care.

## 2022-05-11 NOTE — PROGRESS NOTES
[facility] S[facility]  Otorhinolaryngology-Head & Neck Surgery  Progress Note    Patient Name: Janusz Montgomery Jr.  MRN: [id number]  Code Status: Full Code  Admission Date: 5/4/2022  Hospital Length of Stay: 7 days  Attending Physician: Etta Higgins MD  Primary Care Provider: Miguelina Weathers MD    Subjective:     Interval History: continues to improve regarding pulm status. Pain controlled      Post-Op Info:  Procedure(s) (LRB):  RADICAL NECK DISSECTION (Right)  LARYNGOSCOPY, DIRECT possible biopsy (N/A)   7 Days Post-Op  Hospital Day: 8      Medications:  Continuous Infusions:  Scheduled Meds:   acyclovir  800 mg Oral BID    albuterol-ipratropium  3 mL Nebulization Q4H    apixaban  5 mg Oral BID    clopidogreL  75 mg Oral Daily    dextromethorphan-guaiFENesin  mg  1 tablet Oral BID    furosemide  20 mg Oral Daily    gabapentin  300 mg Oral TID    losartan  12.5 mg Oral Daily    melatonin  9 mg Oral Nightly    metoprolol succinate  25 mg Oral Daily    oxybutynin  5 mg Oral Daily    pantoprazole  40 mg Oral Daily    polyethylene glycol  17 g Oral Daily    senna-docusate 8.6-50 mg  2 tablet Oral BID    tamsulosin  1 capsule Oral Daily    venlafaxine  150 mg Oral BID     PRN Meds:artificial tears, ondansetron, oxyCODONE-acetaminophen, sodium chloride 0.9%, traZODone     Objective:     Vital Signs (24h Range):  Temp:  [96.5 °F (35.8 °C)-98.4 °F (36.9 °C)] 97.8 °F (36.6 °C)  Pulse:  [] 80  Resp:  [18-20] 18  SpO2:  [91 %-98 %] 93 %  BP: (128-167)/(61-68) 150/65     Date 05/11/22 0700 - 05/12/22 0659   Shift 3619-8627 5242-3986 4912-5111 24 Hour Total   INTAKE   P.O. 120   120   Shift Total(mL/kg) 120(0.8)   120(0.8)   OUTPUT   Urine(mL/kg/hr) 675   675   Drains 10   10   Shift Total(mL/kg) 685(4.3)   685(4.3)   Weight (kg) 158.8 158.8 158.8 158.8     Lines/Drains/Airways     Drain  Duration                Closed/Suction Drain 05/04/22 1535 Right Neck Bulb 10 Fr. 6 days          Peripheral  Intravenous Line  Duration                Peripheral IV - Single Lumen 05/04/22 0810 20 G Left;Posterior Forearm 7 days                Physical Exam  Vitals reviewed.   HENT:      Head: Normocephalic.   Neck:      Comments: Incision c/d/i with staples - removed, crusting cleansed with peroxide. erika drain with scant material 25 cc in 24 hours- removed and dressing placed  Pulmonary:      Breath sounds: No stridor.   Neurological:      Mental Status: He is alert.         Significant Labs:  CBC:   Recent Labs   Lab 05/08/22  0406   WBC 10.41   RBC 3.09*   HGB 10.6*   HCT 32.3*      *   MCH 34.3*   MCHC 32.8     CMP:   Recent Labs   Lab 05/05/22  0349 05/07/22  0559 05/08/22  0406   *   < > 98   CALCIUM 9.3   < > 8.6*   ALBUMIN 3.3*  --   --    PROT 6.6  --   --       < > 138   K 5.1   < > 4.3   CO2 26   < > 26      < > 102   BUN 25*   < > 33*   CREATININE 1.5*   < > 1.5*   ALKPHOS 119  --   --    ALT 14  --   --    AST 12  --   --    BILITOT 0.8  --   --     < > = values in this interval not displayed.         Assessment/Plan:     Active Diagnoses:    Diagnosis Date Noted POA    PRINCIPAL PROBLEM:  Head and neck cancer [C76.0] 05/05/2022 Yes    Restrictive lung disease [J98.4] 05/07/2022 Yes    Constipation [K59.00] 05/06/2022 Yes    Chronic heart failure with preserved ejection fraction [I50.32] 02/04/2022 Yes    Chronic respiratory failure with hypoxia [J96.11] 02/04/2022 Yes    Macrocytic anemia [D53.9] 11/09/2020 Yes    Asbestosis [J61] 11/05/2020 Yes    CKD (chronic kidney disease) stage 3, GFR 30-59 ml/min [N18.30] 08/24/2020 Yes    Paroxysmal atrial fibrillation  [I48.0] 07/06/2016 Yes    Morbid obesity with body mass index of 45.0-49.9 in adult [E66.01, Z68.42] 08/14/2015 Not Applicable    GERD (gastroesophageal reflux disease) [K21.9] 07/25/2012 Yes    CHARISMA on CPAP [G47.33, Z99.89]  Not Applicable    Coronary artery disease involving native coronary artery of native  heart without angina pectoris s/p RCA stent [I25.10]  Yes      Problems Resolved During this Admission:    Diagnosis Date Noted Date Resolved POA    Hypoxia [R09.02] 05/06/2022 05/07/2022 Yes   reviewed pulm recs  Ok for dc after seen by speech   erika drain with low output, removed today  Staples removed  Wound care recs reviewed  Discussed outpt plan and reviewed path results    Etta Higgins MD  Otorhinolaryngology-Head & Neck Surgery  [facility]

## 2022-05-11 NOTE — NURSING
Patient cleared for discharge from MD and .     PIV removed per order. Reviewed AVS and instructions with patient and family; both verbalized understanding of instructions at this time.     Home portable O2 delivered and at bedside. Patient connected to portable O2.

## 2022-05-11 NOTE — PLAN OF CARE
Patient now weaned to 3.5 liters of O2 this AM.  Seems to be doing well.  He is eating well and VENU drainage is improving.  Pulm/CC will sign off at this time, no new additional recs.  Please continue to follow outpatient with pulm and sleep as previously scheduled.      Please re-consult if any new questions arise.    Fredo Manrique MD  [facility]

## 2022-05-11 NOTE — PLAN OF CARE
Problem: Adult Inpatient Plan of Care  Goal: Plan of Care Review  Outcome: Ongoing, Progressing  Flowsheets (Taken 5/11/2022 0444)  Plan of Care Reviewed With: patient    Patient remains free from injury and falls this shift. SOB slightly improving on movement from chair to bed. Patient on 3.5 liters, weaned by respiratory therapist. Incision to neck open to air with staples intact. VENU with serosanguinous output. Patient sleeping comfortably wearing CPAP. Able to make needs known. Plan of care continued.

## 2022-05-11 NOTE — PROGRESS NOTES
[facility] Medicine  Progress Note    Patient Name: Janusz Montgomery Jr.  MRN: [id number]  Patient Class: IP- Inpatient   Admission Date: 5/4/2022  Length of Stay: 7 days  Attending Physician: Etta Higgins MD  Primary Care Provider: Miguelina Weathers MD        Subjective:     Principal Problem:Head and neck cancer        HPI:  Mr Janusz Montgomery Jr. is a 77 y.o. man with CAD, HFpEF, CHARISMA on APAP, asbestosis, and head/neck SCC admitted to ENT. He is s/p RADICAL NECK DISSECTION (Right) Level 2-5 with sacrifice of portion of SCM, internal jugular vein and accessory nerve due to tumor invasion on 5/4/2022. Hospital medicine consulted for shortness of breath.     Patient states he has had intermittent shortness of breath and dyspnea on exertion with lower extremity edema for 2 years. He has a history of CAD s/p stenting and diastolic CHF with last TTE 2022 EF 60% on home lasix. 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 He has PFTs from 2020 with moderate restriction. He has CHARISMA on APAP 14-16 at home. He states he was previously tested for home oxygen and was supposed to receive it but did not. He has had a couple episodes of shortness of breath/dyspnea on exertion since admission after surgery. He has a cough productive of sputum. No fever. No chest pain. No leg swelling.        Overview/Hospital Course:  No notes on file    Interval History: doing well today, plan for d/c    Review of Systems   Constitutional:  Negative for chills and fever.   Respiratory:  Positive for wheezing. Negative for cough and shortness of breath.    Cardiovascular:  Negative for chest pain, palpitations and leg swelling.   Gastrointestinal:  Negative for abdominal distention, abdominal pain, diarrhea, nausea and vomiting.   Genitourinary:  Negative for difficulty urinating.   Neurological:  Negative for weakness and numbness.    Psychiatric/Behavioral:  Negative for confusion.    Objective:     Vital Signs (Most Recent):  Temp: 97.6 °F (36.4 °C) (05/11/22 1146)  Pulse: 77 (05/11/22 1200)  Resp: 18 (05/11/22 1200)  BP: 138/86 (05/11/22 1146)  SpO2: 96 % (05/11/22 1200)   Vital Signs (24h Range):  Temp:  [97.6 °F (36.4 °C)-98.4 °F (36.9 °C)] 97.6 °F (36.4 °C)  Pulse:  [] 77  Resp:  [18-20] 18  SpO2:  [91 %-98 %] 96 %  BP: (137-167)/(63-86) 138/86     Weight: (!) 158.8 kg (350 lb)  Body mass index is 46.18 kg/m².    Intake/Output Summary (Last 24 hours) at 5/11/2022 1552  Last data filed at 5/11/2022 0906  Gross per 24 hour   Intake 840 ml   Output 2225 ml   Net -1385 ml        Physical Exam  Vitals and nursing note reviewed.   Constitutional:       General: He is not in acute distress.     Appearance: He is obese. He is not toxic-appearing.   HENT:      Head: Normocephalic and atraumatic.      Nose: Nose normal.      Mouth/Throat:      Mouth: Mucous membranes are moist.   Neck:      Comments: Neck incision staples and drain in place  Cardiovascular:      Rate and Rhythm: Normal rate and regular rhythm.      Pulses: Normal pulses.      Heart sounds: Normal heart sounds. No murmur heard.    No gallop.   Pulmonary:      Effort: Pulmonary effort is normal. No respiratory distress.      Breath sounds: No wheezing or rales.      Comments: 4L NC  Abdominal:      General: Bowel sounds are normal. There is distension.      Palpations: Abdomen is soft.      Tenderness: There is no abdominal tenderness. There is no guarding.   Musculoskeletal:      Right lower leg: No edema.      Left lower leg: No edema.   Skin:     General: Skin is warm and dry.   Neurological:      Mental Status: He is alert and oriented to person, place, and time.       Significant Labs: All pertinent labs within the past 24 hours have been reviewed.    Significant Imaging: I have reviewed all pertinent imaging results/findings within the past 24 hours.      Assessment/Plan:  "     * Head and neck cancer  S/p dissection - see op note  Management per ENT      Restrictive lung disease  Noted on prior PFTs 2020  See resp failure       Constipation  Bowel regimen ordered  Resolved.     Chronic respiratory failure with hypoxia  Patient admitted with Hypoxic which is Chronic.  he is not on home oxygen because it was never delivered. Signs/symptoms of respiratory failure include- increased work of breathing, wheezing, dyspnea on exertion present on admission and still present with ambulation  - Labs and images were reviewed.  - Supplemental oxygen was provided and noted- Nasal Cannula 4 LPM and APAP QHS  - Respiratory failure is due to- CHF, Obesity Hypoventilation and restrictive lung disease, morbid obesity, pulmonary hypertension  - O2 ordered for home  - already has APAP at home  - neb ordered for home- need to make sure he has duoneb Rx too  - continue home lasix  - no signs of pneumonia.   - continue nebs, mucinex, acapella  - breathing appears improved  - Pulmonary consulted and appreciate recommendations      Chronic heart failure with preserved ejection fraction  No edema.   BNP may be underestimated due to morbid obesity   Recent Labs   Lab 05/06/22  1311   BNP 46     Last TTE 4/2022 with EF 60%  Continue home lasix      Macrocytic anemia  Noted. Stable post op.       Asbestosis  PFTs 2020 with restrictive lung disease, but Pulmonary note suggests this is more due to morbid obesity. Last CT 2/2022 showing "Symmetric coarse calcified pleural plaques of the bilateral hanh thoraces sparing the mediastinal pleura associated with loculated pleural effusion, subpleural fibrosis and volume loss in the left lower lobe, not significantly changed and most compatible with asbestosis."  - may need repeat PFTs as outpatient   - follow up with Pulmonary      CKD (chronic kidney disease) stage 3, GFR 30-59 ml/min  Cr 1.5, at baseline      Paroxysmal atrial fibrillation   Patient with Paroxysmal (<7 " days) atrial fibrillation which is controlled currently with Beta Blocker. Patient is currently in sinus rhythm.[id number] Score: 3. Anticoagulation indicated. Anticoagulation done with eliquis.        Morbid obesity with body mass index of 45.0-49.9 in adult  Body mass index is 46.18 kg/m². Morbid obesity complicates all aspects of disease management from diagnostic modalities to treatment. Weight loss encouraged and health benefits explained to patient.   - discussed need for weight loss with patient at length         GERD (gastroesophageal reflux disease)  - start on PPI      CHARISMA on CPAP  Follows with Pulmonary  On APAP 14-16 at home  Morbid obesity contributes to CHARISMA      Coronary artery disease involving native coronary artery of native heart without angina pectoris s/p RCA stent  Noted. No angina. Continue home Rx        VTE Risk Mitigation (From admission, onward)         Ordered     apixaban tablet 5 mg  2 times daily         05/06/22 0950                Discharge Planning   KIARA: 5/11/2022     Code Status: Full Code   Is the patient medically ready for discharge?:     Reason for patient still in hospital (select all that apply): Treatment  Discharge Plan A: Home Health   Discharge Delays: None known at this time              Romero Martin MD  Department of Hospital Medicine   [facility] Surg

## 2022-05-11 NOTE — ASSESSMENT & PLAN NOTE
Patient with Paroxysmal (<7 days) atrial fibrillation which is controlled currently with Beta Blocker. Patient is currently in sinus rhythm.[id number] Score: 3. Anticoagulation indicated. Anticoagulation done with eliquis.

## 2022-05-11 NOTE — TELEPHONE ENCOUNTER
"----- Message from Malini Pagan NP sent at 5/11/2022 12:49 PM CDT -----  Contact: Patient daughter  In response to the questions:    1. Avoid the "no salt" because it is high in potassium. A better option would be [employer] Magic No Salt seasoning. Limit sodium to now more than 2,000 mg (2 grams) of sodium per day.    2. I can send some kidney websites over the portal, but I do not have any specific websites I recommend frequently.  I also do not know of any meal services off the top of my head. [employer] may have resources though.    3. The Heart Healthy meals should be okay on sodium. His potassium levels have been okay recently, but he has also been on a low potassium diet. I would try to limit potassium consumption to no more than 3,000 mg per day and will send a resource for this over the portal. As long as the heart healthy meals are not very heavy in high potassium foods, they should be okay.  At the last visit, I instructed him not to restrict his phosphorous, so they do not need to be strict on high phosphorous foods.    ThanksMalini  ----- Message -----  From: Yuly Fenton MA  Sent: 5/11/2022   9:40 AM CDT  To: Malini Pagan NP    Daughter knows pt. Is on a renal diet prior to him being in the hosp., assuming he will need to continue when discharged.  There is a product called " no salt", she wants to know if she can use this. She said the dietician was not helpful. What can you recommend?,web sites etc... also what is his max sodium intake per day. Her dads insurance, [employer] will be sending out Renal diet meals for 14 days after d/c'd. And would be sending out an additional  20 days of a heart healthy meals, she wants to know if pt. Can have these meals in substitute, or need to stick to renal diet.  And also are there any local or web sites that they can order from (renal friendly) that they can use.  ----- Message -----  From: Malini Pagan NP  Sent: " 5/11/2022   8:18 AM CDT  To: Yuly Fenton MA    Pls call and ask her what her questions are. Thanks  ----- Message -----  From: Yuly Fenton MA  Sent: 5/10/2022   1:18 PM CDT  To: Malini Pagan NP      ----- Message -----  From: Jason Jacobs  Sent: 5/10/2022  11:51 AM CDT  To: Ej Nayak Staff    Patient daughter requesting call back in regards to having questions about the patient diet. She also would like to notify the doctor the the patient been admitted into the hospital since Wednesday.      Patient daughter@ [phone]

## 2022-05-11 NOTE — PLAN OF CARE
Per Oriana with [facility], clear to pull 2 e-tanks and nebulizer. [place] notified respiratory to be delivered to bedside.     Home health plan of care orders sent to [facility]  via care port.

## 2022-05-11 NOTE — PLAN OF CARE
Problem: Adult Inpatient Plan of Care  Goal: Plan of Care Review  Outcome: Met  Goal: Patient-Specific Goal (Individualized)  Outcome: Met  Goal: Absence of Hospital-Acquired Illness or Injury  Outcome: Met  Goal: Optimal Comfort and Wellbeing  Outcome: Met  Goal: Readiness for Transition of Care  Outcome: Met     Problem: Bariatric Environmental Safety  Goal: Safety Maintained with Care  Outcome: Met     Problem: Infection  Goal: Absence of Infection Signs and Symptoms  Outcome: Met     Problem: Fall Injury Risk  Goal: Absence of Fall and Fall-Related Injury  Outcome: Met     Problem: Skin Injury Risk Increased  Goal: Skin Health and Integrity  Outcome: Met

## 2022-05-11 NOTE — PROGRESS NOTES
Home Oxygen Evaluation    Date Performed: 2022    1) Patient's Home O2 Sat on room air, while at rest: 90%        If O2 sats on room air at rest are 88% or below, patient qualifies. No additional testing needed. Document N/A in steps 2 and 3. If 89% or above, complete steps 2.      2) Patient's O2 Sat on room air while exercisin%        If O2 sats on room air while exercising remain 89% or above patient does not qualify, no further testing needed Document N/A in step 3. If O2 sats on room air while exercising are 88% or below, continue to step 3.      3) Patient's O2 Sat while exercising on O2: 93% at 4 LPM         (Must show improvement from #2 for patients to qualify)    If O2 sats improve on oxygen, patient qualifies for portable oxygen. If not, the patient does not qualify.

## 2022-05-11 NOTE — PLAN OF CARE
Safe to d/c on current diet. Needs baseline modified barium swallow prior to radiation (please note fluro equipment at main campus will best accommodate Pt's body habitus.)

## 2022-05-11 NOTE — ASSESSMENT & PLAN NOTE
Patient admitted with Hypoxic which is Chronic.  he is not on home oxygen because it was never delivered. Signs/symptoms of respiratory failure include- increased work of breathing, wheezing, dyspnea on exertion present on admission and still present with ambulation  - Labs and images were reviewed.  - Supplemental oxygen was provided and noted- Nasal Cannula 4 LPM and APAP QHS  - Respiratory failure is due to- CHF, Obesity Hypoventilation and restrictive lung disease, morbid obesity, pulmonary hypertension  - O2 ordered for home  - already has APAP at home  - neb ordered for home- need to make sure he has duoneb Rx too  - continue home lasix  - no signs of pneumonia.   - continue nebs, mucinex, acapella  - breathing appears improved  - Pulmonary consulted and appreciate recommendations

## 2022-05-11 NOTE — PT/OT/SLP EVAL
Speech Language Pathology Evaluation  Bedside Swallow    Patient Name:  Janusz Montgomery Jr.   MRN:  [id number]  Admitting Diagnosis: Head and neck cancer    Recommendations:                 General Recommendations:  outpatient baseline modified barium swallow prior to radiation  Diet recommendations:  Regular, Thin   Aspiration Precautions: take frequent rest breaks during meals, small sips   General Precautions: Standard,    Communication strategies:  none    History:     Past Medical History:   Diagnosis Date    (HFpEF) heart failure with preserved ejection fraction 2/4/2022    NAT (acute kidney injury) 3/19/2017    ALLERGIC RHINITIS     Anemia     Anxiety     Basal cell carcinoma 10/19/2018    forehead and right medial shoulder    Basal cell carcinoma 01/09/2019    left nasal bridge and left posterior ear    Basal cell carcinoma 06/12/2020    left lower medial leg - Efudex    Basal cell carcinoma 11/30/2020    left medial upper eyelid canthus superficial BCC    Chronic rhinitis 5/3/2013    Chronic rhinitis 5/3/2013    Coronary artery disease involving native coronary artery of native heart without angina pectoris s/p RCA stent     Cortical cataract of both eyes 3/18/2016    Delayed sleep phase syndrome 3/13/2019    Depression     Erectile dysfunction 3/24/2014    Erectile dysfunction 3/24/2014    Essential hypertension     GERD (gastroesophageal reflux disease) 7/25/2012    Gout, arthritis     Grade III open fracture of left tibia and fibula s/p ex-fix on 7/1/16 and ORIF of left tibia on 7/15 7/6/2016    H/O: iritis     Helicobacter pylori (H. pylori) infection     Treated    Hepatitis     Herpes simplex keratoconjunctivitis 9/30/2015    - on acyclovir - followed by opthalmologyDr. Uribe     Herpes simplex keratoconjunctivitis 9/30/2015    - on acyclovir - followed by opthalmologyDr. Uribe     Hyperkalemia 2/28/2017    Hyperlipidemia     Hypogonadism male     Hypogonadism male      "Keloid cicatrix     Mixed anxiety and depressive disorder     Morbid obesity     Obstructive sleep apnea on CPAP     CPAP    Osteoarthritis of left knee 7/25/2012    Paroxysmal atrial fibrillation 7/6/2016    Primary osteoarthritis of left knee 7/25/2012    Prominent aorta 1/25/2016    "RESULTS: THE HEART IS MILDLY ENLARGED WITH A SLIGHTLY PROMINENT AORTA" - Xray Chest PA & Lateral 12-     Prostate cancer 2/15/2016    - followed by urology, Dr. Young     Prostate cancer 2/15/2016    - followed by urology, Dr. 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    chest and forehead    Squamous cell cancer of skin of nose     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 7/6/2016    Type III open fracture of left tibia and fibula with routine healing 7/6/2016    Vitamin D deficiency disease     Vitreous detachment 3/18/2016       Past Surgical History:   Procedure Laterality Date    ADJACENT TISSUE TRANSFER Left 11/30/2020    Procedure: ADJACENT TISSUE TRANSFER x2 ;  Surgeon: Teresa Cooper MD;  [address]  Service: Ophthalmology;  Laterality: Left;  Left glabellar 8.5x11x3 and Left upper eyelid advancement flap [id number]      Cardiac stenting x2      CATARACT EXTRACTION W/  INTRAOCULAR LENS IMPLANT Right 3/29/2016    Dr. Conteh    CATARACT EXTRACTION W/  INTRAOCULAR LENS IMPLANT Left 4/12/2016        COLONOSCOPY N/A 7/23/2020    Procedure: COLONOSCOPY;  Surgeon: Nico Lackey MD;  Location: [facility] (2ND FLR);  Service: Endoscopy;  Laterality: N/A;    DIRECT LARYNGOSCOPY N/A 5/4/2022    Procedure: LARYNGOSCOPY, DIRECT possible biopsy;  Surgeon: Etta Higgins MD;  Location: [facility] OR;  " "Service: ENT;  Laterality: N/A;  RN PREOP ON 4/29/22. NEEDS T&S DAY OF SURGERY--NF  CONSENTS DAY OF SURGERY    DISSECTION OF NECK Right 5/4/2022    Procedure: RADICAL NECK DISSECTION;  Surgeon: Etta Higgins MD;  Location: [facility] OR;  Service: ENT;  Laterality: Right;  TO FOLLOW DR ALMAZAN    ESOPHAGOGASTRODUODENOSCOPY N/A 7/23/2020    Procedure: EGD (ESOPHAGOGASTRODUODENOSCOPY);  Surgeon: Nico Lackey MD;  Location: [facility] (2ND FLR);  Service: Endoscopy;  Laterality: N/A;  per Dr Lackey-Will proceed with EGD and colonoscopy on the 2nd floor due to his comorbidities including obesity, sleep apnea, restrictive lung disease, coronary artery disease.  has loop recorder      ok to hold Eliquis 2 days per Dr Sandhu-must remain    EXTERNAL FIXATION TIBIAL FRACTURE Left 07/01/2016    INSERTION OF IMPLANTABLE LOOP RECORDER  04/07/2017    LEFT HEART CATHETERIZATION Left 1/30/2020    Procedure: Left heart cath;  Surgeon: Benjamin Sandhu MD;  Location: [facility] CATH LAB;  Service: Cardiology;  Laterality: Left;  RN PREOP 1/28/20--Pt starting Plavix loading dose today (8pills)- Dr. Sandhu aware.  Pt has a bandaged "non healing area to LLE"--Dr. Sandhu aware.    LEFT HEART CATHETERIZATION Left 2/14/2020    Procedure: Left heart cath, IVUS guided left main / LAD PCI. Noon start, radial approach;  Surgeon: Miguel Angel Brady MD;  Location: [facility] CATH LAB;  Service: Cardiology;  Laterality: Left;  RN PRE OP 2-7-2020  BMI--45.61    ORIF TIBIA FRACTURE Left 07/15/2016    PROBING OF NASOLACRIMAL DUCT WITH INSERTION OF TUBE Left 11/30/2020    Procedure: PROBING, NASOLACRIMAL DUCT,;  Surgeon: Teresa Cooper MD;  Location: [facility] OR 1ST FLR;  Service: Ophthalmology;  Laterality: Left;    RADIOACTIVE SEED IMPLANTATION OF PROSTATE N/A 8/8/2018    Procedure: INSERTION, RADIOACTIVE SEED, PROSTATE;  Surgeon: Bipin Thompson MD;  Location: [facility] OR 1ST FLR;  Service: Urology;  Laterality: N/A;  1 hour    SKIN BIOPSY      Squamous cell " cancer removal x3 with Mohs surgery      TONSILLECTOMY      TOTAL KNEE ARTHROPLASTY  10/2012    TRIGGER FINGER RELEASE Right 10/14/2020    Procedure: RELEASE, TRIGGER FINGER - right;  Surgeon: Rad Swift MD;  [facility] OR;  Service: Orthopedics;  Laterality: Right;    trus/bx      ULTRASOUND GUIDANCE  2/14/2020    Procedure: Ultrasound Guidance;  Surgeon: Miguel Angel Brady MD;  Location: [facility] CATH LAB;  Service: Cardiology;;       Social History: Patient (I) for ADLs.    Chest X-Rays: 5/5 Reconfirmed scattered calcified pleural plaques.  Low lung volumes.  No focal infiltrates, obvious pleural fluid blunting right or left costophrenic sulci, or pneumothorax.     Prior diet: unrestricted    Occupation/hobbies/homemaking: works with computers    Subjective   Pt reporting after meals he frequently becomes SOB. He reports ENT POC includes aggressive radiation to neck.   Patient goals: safe po    Pain/Comfort:  · Pain Rating 1: 0/10    Respiratory Status: Nasal cannula, flow 3.5 L/min    Objective:     Oral Musculature Evaluation  · Oral Musculature: WFL  · Dentition: present and adequate  · Secretion Management: adequate  · Mucosal Quality: good  · Mandibular Strength and Mobility: WFL  · Oral Labial Strength and Mobility: WFL  · Lingual Strength and Mobility: WFL  · Voice Prior to PO Intake: wfl  · Oral Musculature Comments: wfl    Bedside Swallow Eval:   Consistencies Assessed:  · Thin liquids mutliple trials via straw across ~3 oz  · Solids multiple self presented trials     Oral Phase:   · WFL    Pharyngeal Phase:   · no overt clinical signs/symptoms of aspiration    Compensatory Strategies  · None    Treatment: please note silent aspiration cannot be r/o at bedside. Pt and spouse were extensively educated on normal swallow function and likely negative impact of radiation to swallow. Pt was instructed to start safe swallow habits of alternating small bites and sips, eat slowly, and take frequent breaks  during meals. They were educated on ST's recommendation for baseline MBS prior to radiation and anticipated need for outpatient dysphagia therapy as well as the importance of good oral care. They report intended compliance. ENT also informed of ST's recs via secure chat.     Assessment:     Janusz Montgomery Jr. is a 77 y.o. male with dx of head and neck cancer he presents with functional swallow at this time. Pt was educated to anticipate dysphagia s/p radiation to neck and ST's role in ongoing treatment.     Goals:   Multidisciplinary Problems     SLP Goals     Not on file          Multidisciplinary Problems (Resolved)        Problem: SLP    Goal Priority Disciplines Outcome   SLP Goal   (Resolved)    Low SLP Met   Description: Pt will participate in clinical swallow eval.  Pt will demonstrate understanding of anticipated dysphagia post radiation to head and neck and safe swallow habits to begin prior.                    Plan:     · Plan of Care expires:  05/11/22  · Plan of Care reviewed with:  patient   · SLP Follow-Up:  No       Discharge recommendations:  outpatient speech therapy   Barriers to Discharge:  None    Time Tracking:     SLP Treatment Date:   05/11/22  Speech Start Time:  1235  Speech Stop Time:  1320     Speech Total Time (min):  45 min    Billable Minutes: Eval Swallow and Oral Function 15 and Self Care/Home Management Training 30    05/11/2022

## 2022-05-11 NOTE — SUBJECTIVE & OBJECTIVE
Interval History: doing well today, plan for d/c    Review of Systems   Constitutional:  Negative for chills and fever.   Respiratory:  Positive for wheezing. Negative for cough and shortness of breath.    Cardiovascular:  Negative for chest pain, palpitations and leg swelling.   Gastrointestinal:  Negative for abdominal distention, abdominal pain, diarrhea, nausea and vomiting.   Genitourinary:  Negative for difficulty urinating.   Neurological:  Negative for weakness and numbness.   Psychiatric/Behavioral:  Negative for confusion.    Objective:     Vital Signs (Most Recent):  Temp: 97.6 °F (36.4 °C) (05/11/22 1146)  Pulse: 77 (05/11/22 1200)  Resp: 18 (05/11/22 1200)  BP: 138/86 (05/11/22 1146)  SpO2: 96 % (05/11/22 1200)   Vital Signs (24h Range):  Temp:  [97.6 °F (36.4 °C)-98.4 °F (36.9 °C)] 97.6 °F (36.4 °C)  Pulse:  [] 77  Resp:  [18-20] 18  SpO2:  [91 %-98 %] 96 %  BP: (137-167)/(63-86) 138/86     Weight: (!) 158.8 kg (350 lb)  Body mass index is 46.18 kg/m².    Intake/Output Summary (Last 24 hours) at 5/11/2022 1552  Last data filed at 5/11/2022 0906  Gross per 24 hour   Intake 840 ml   Output 2225 ml   Net -1385 ml        Physical Exam  Vitals and nursing note reviewed.   Constitutional:       General: He is not in acute distress.     Appearance: He is obese. He is not toxic-appearing.   HENT:      Head: Normocephalic and atraumatic.      Nose: Nose normal.      Mouth/Throat:      Mouth: Mucous membranes are moist.   Neck:      Comments: Neck incision staples and drain in place  Cardiovascular:      Rate and Rhythm: Normal rate and regular rhythm.      Pulses: Normal pulses.      Heart sounds: Normal heart sounds. No murmur heard.    No gallop.   Pulmonary:      Effort: Pulmonary effort is normal. No respiratory distress.      Breath sounds: No wheezing or rales.      Comments: 4L NC  Abdominal:      General: Bowel sounds are normal. There is distension.      Palpations: Abdomen is soft.       Tenderness: There is no abdominal tenderness. There is no guarding.   Musculoskeletal:      Right lower leg: No edema.      Left lower leg: No edema.   Skin:     General: Skin is warm and dry.   Neurological:      Mental Status: He is alert and oriented to person, place, and time.       Significant Labs: All pertinent labs within the past 24 hours have been reviewed.    Significant Imaging: I have reviewed all pertinent imaging results/findings within the past 24 hours.

## 2022-05-11 NOTE — PLAN OF CARE
[place] - Med Surg  Discharge Final Note    Patient clear to discharge from case management stand point. Follow up appointments reviewed with patient at bedside, listed on avs. Pt accepting to [facility]. Pt stated his sister will be helping him home at discharge.     Primary Care Provider: Miguelina Weathers MD    Expected Discharge Date: 5/11/2022    Final Discharge Note (most recent)     Final Note - 05/11/22 1639        Final Note    Assessment Type Final Discharge Note     Anticipated Discharge Disposition Home-Health Care [facility]   [facility]    What phone number can be called within the next 1-3 days to see how you are doing after discharge? [phone]        Post-Acute Status    Post-Acute Authorization Home Health;HME     HME Status Set-up Complete/Auth obtained     Home Health Status Set-up Complete/Auth obtained     Coverage [employer] Managed medicare     Discharge Delays None known at this time                 Important Message from Medicare  Important Message from Medicare regarding Discharge Appeal Rights: Given to patient/caregiver, Explained to patient/caregiver, Signed/date by patient/caregiver     Date IMM was signed: 05/09/22  Time IMM was signed: 1205    Contact Info     Miguelina Weathers MD   Specialty: Internal Medicine, Pediatrics   Relationship: PCP - General  Hypertension Digital Medicine Responsible Provider  Hyperlipidemia Digital Medicine Responsible Provider    [address] [facility] [address]   Phone: [phone]       Next Steps: Schedule an appointment as soon as possible for a visit in 1 week(s)    [facility] [address]  [facility] [address]   Phone: [phone]       Next Steps: Follow up    Instructions: Home health    [facility]   Specialty: DME Provider    [address] [place] [address] A  [facility] [address]   Phone: [phone]       Next Steps: Follow up    Instructions: Oxygen for home use and nebulizer

## 2022-05-12 NOTE — TELEPHONE ENCOUNTER
----- Message from Myrna Ambriz sent at 5/12/2022  8:59 AM CDT -----  Regarding: wife  .Type:  Patient Returning Call    Who Called:  Bri - wife     Who Left Message for Patient: not sure     Does the patient know what this is regarding?:     Would the patient rather a call back or a response via [facility]?  Call   Best Call Back :  [phone]

## 2022-05-12 NOTE — TELEPHONE ENCOUNTER
Spoke with patient's wife she was returning Dr Higgins phone call from this morning.We called to confirm his appointment.

## 2022-05-13 NOTE — CONSULTS
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 questions.    Benjamin Sandhu MD  Cardiology   [facility] - Emergency Dept

## 2022-05-13 NOTE — TELEPHONE ENCOUNTER
Pulse was 133 -168 at 5:30am today, pts only complaints is he feels sob.  Denies any chest pain at this time.  Care advice states to go to ED now.  Pts daughter in law will drive him to [facility].  All questions answered.     Reason for Disposition   Difficulty breathing    Additional Information   Negative: Passed out (i.e., lost consciousness, collapsed and was not responding)   Negative: Shock suspected (e.g., cold/pale/clammy skin, too weak to stand, low BP, rapid pulse)   Negative: Difficult to awaken or acting confused (e.g., disoriented, slurred speech)   Negative: Visible sweat on face or sweat dripping down face   Negative: Unable to walk, or can only walk with assistance (e.g., requires support)   Negative: [1] Received SHOCK from implantable cardiac defibrillator AND [2] persisting symptoms (i.e., palpitations, lightheadedness)   Negative: [1] Dizziness, lightheadedness, or weakness AND [2] heart beating very rapidly (e.g., > 140 / minute)   Negative: [1] Dizziness, lightheadedness, or weakness AND [2] heart beating very slowly  (e.g., < 50 / minute)   Negative: Sounds like a life-threatening emergency to the triager   Negative: Chest pain    Protocols used: HEART RATE AND HEARTBEAT [id number]

## 2022-05-13 NOTE — PHARMACY MED REC
"Admission Medication History     The home medication history was taken by Josephine Leos CPhT.    You may go to "Admission" then "Reconcile Home Medications" tabs to review and/or act upon these items.      The home medication list has been updated by the Pharmacy department.    Please read ALL comments highlighted in yellow.    Please address this information as you see fit.     Feel free to contact us if you have any questions or require assistance.      The medications listed below were removed from the home medication list. Please reorder if appropriate:  Patient reports no longer taking the following medication(s):   Zovirax 800 mg tab   Lidex 0.05% cream   Kenalog 0.1 % ointment    Medications listed below were obtained from: Patient/family and Analytic software- [employer]  (Not in a hospital admission)      Potential issues to be addressed PRIOR TO DISCHARGE  Patient reported not taking the following medications: (omeprazole ( Prilosec ) 20 mg capsule). These medications remain on the home medication list. Please address accordingly.     Josephine Leos CPhT.  [phone]                    .          "

## 2022-05-13 NOTE — H&P
[facility]  History & Physical    Patient Name: Janusz Montgomery Jr.  MRN: [id number]  Patient Class: IP- Inpatient  Admission Date: 5/13/2022  Attending Physician: Bhaskar Soliz MD   Primary Care Provider: Miguelina Weathers MD         Patient information was obtained from patient, past medical records and ER records.     Subjective:     Principal Problem:Paroxysmal atrial fibrillation    Chief Complaint:   Chief Complaint   Patient presents with    Shortness of Breath     Patient comes in with increased SOB, states hard to keep a breath feels as if needs to take another before releasing previous breath. Patient had right neck lymph nodes removed due to Ssca on Wednesday, denies difficulty swallowing.         HPI: Mr. Montgomery is a 77-year-old gentleman with morbid obesity (BMI 45), CAD status post 3 stents, hypertension, PAD, paroxysmal atrial fibrillation on Eliquis, CHARISMA on CPAP q.h.s. (14 cm H2O), asbestosis, CKD 3, history of prostate cancer status post resection, depression, head and neck tumor status post radical neck dissection 1.5 weeks ago.  He presented to our emergency department for elevated heart rate and progressive dyspnea with exertion.  Patient was discharged after neck dissection 1.5 weeks ago and since then has experienced increased dyspnea.  Patient took his SpO2 via pulse oximeter today and found that it was normal but found that his heart rate was elevated to the 170s.  He was directed by an on-call nurse to present to our emergency department.  Reports associated fatigue, orthopnea, cough.  Patient used albuterol multiple times this morning.  Denies PND, lower extremity edema, fever, chills.  Patient has been on a beta-blocker and Eliquis for many years related to the paroxysmal atrial fibrillation.  Though has not been in documented atrial fibrillation for years.  He also notes some redness at his incision site on his right upper chest adjacent to  neck.    In the emergency department he was tachycardic to the 230s and hypotensive as low as 70/35.  Labs revealed stable macrocytic anemia.  Leukocyte count below recent admission.  Creatinine elevated to 1.8, slightly above baseline.  Troponin elevated to 0.046.  BNP within normal limits.  Lactate not elevated.  Chest x-ray without acute changes.  Patient started on amiodarone drip, received 500 cc LR bolus as well as 1.5 L NS bolus, given metoprolol IV 2.5 mg and Toprol XL 25 mg p.o. Admitted to hospital medicine service in the ICU for further management.      Past medical history:  As above  Past surgical history, recent right neck dissection  Family history:  Mother with CAD, father had lung cancer  Social history:  Lives at home with wife.  Independent in ADLs.  Never smoker.  History of asbestos exposure.  Minimal alcohol usage.  Never illicit usage.  Allergies:  Ciprofloxacin and Zosyn  Medications:  See medication reconciliation      Review of Systems   Constitutional:  Positive for activity change. Negative for appetite change, chills, fatigue and fever.   HENT:  Negative for congestion, rhinorrhea, sinus pressure, sinus pain and sore throat.    Eyes:  Negative for photophobia, pain and visual disturbance.   Respiratory:  Positive for shortness of breath. Negative for cough, chest tightness and wheezing.    Cardiovascular:  Negative for chest pain, palpitations and leg swelling.   Gastrointestinal:  Negative for abdominal distention, abdominal pain, constipation, diarrhea, nausea and vomiting.   Genitourinary:  Negative for dysuria, hematuria and urgency.   Musculoskeletal:  Negative for arthralgias, joint swelling and myalgias.   Skin:  Positive for color change and wound. Negative for pallor.   Neurological:  Negative for dizziness, weakness, numbness and headaches.   Hematological:  Does not bruise/bleed easily.   Psychiatric/Behavioral:  Negative for behavioral problems, confusion and hallucinations.     Objective:      Vital Signs (Most Recent):  Temp: (P) 97.7 °F (36.5 °C) (05/14/22 0800)  Pulse: 76 (05/14/22 0900)  Resp: (!) 24 (05/14/22 0900)  BP: (!) 179/74 (05/14/22 0911)  SpO2: 99 % (05/14/22 0900)    Vital Signs (24h Range):  Temp:  [97.7 °F (36.5 °C)-98.5 °F (36.9 °C)] (P) 97.7 °F (36.5 °C)  Pulse:  [] 76  Resp:  [11-34] 24  SpO2:  [88 %-100 %] 99 %  BP: ()/(44-83) 179/74      Weight: (!) 155.3 kg (342 lb 6 oz)  Body mass index is 45.17 kg/m².     Intake/Output Summary (Last 24 hours) at 5/14/2022 1026  Last data filed at 5/14/2022 0900      Gross per 24 hour   Intake 756.2 ml   Output 1626 ml   Net -869.8 ml      Physical Exam  Vitals and nursing note reviewed.   Constitutional:       General:  Acute distress     Appearance: He is well-developed. He is obese.   HENT:      Head: Normocephalic and atraumatic.      Right Ear: External ear normal.      Left Ear: External ear normal.      Nose: Nose normal.   Eyes:      Conjunctiva/sclera: Conjunctivae normal.      Pupils: Pupils are equal, round, and reactive to light.   Cardiovascular:      Rate and Rhythm:  Tachycardic.  Irregular rhythm  Pulmonary:      Effort:  Respiratory distress     Breath sounds: Normal breath sounds. No wheezing or rales.      Comments: Limited lung volumes  Abdominal:      General: Bowel sounds are normal. There is no distension.      Palpations: Abdomen is soft.      Tenderness: There is no abdominal tenderness.   Musculoskeletal:         General: No tenderness. Normal range of motion.      Cervical back: Normal range of motion and neck supple.      Right lower leg: No edema.      Left lower leg: No edema.   Skin:     General: Skin is warm and dry.      Comments: Erythema at right chest adjacent to right neck wound   Neurological:      Mental Status: He is alert and oriented to person, place, and time.   Psychiatric:         Thought Content: Thought content normal.     Assessment/Plan:     * Paroxysmal atrial  fibrillation   Patient with Paroxysmal (<7 days) atrial fibrillation which is uncontrolled currently with Beta Blocker and Amiodarone. Patient is currently in atrial fibrillation.[id number] Score: 3. Anticoagulation indicated. Anticoagulation done with eliquis.  Will titrate metoprolol as able given BP.  Appreciate cardiology recs.  Started on amiodarone drip, rate improved.  NPO after midnight for possible cardioversion.        Cellulitis  -some erythema and minimal drainage at wound site of recent neck dissection  -started Bactrim.  Would plan for 10-14 day course  -ENT consulted to evaluate      Prostate cancer  -status post radiation by pellet placement in 2018  -holding home Flomax and Ditropan given hypotension  -monitor urine output and symptoms of retention.  P.r.n. straight cath      Asbestosis  Pleural plaques noted on chest x-ray, unchanged      CKD (chronic kidney disease) stage 3, GFR 30-59 ml/min  -does not appear to be in acute renal failure.  Creatinine clearance sufficient at present so as unlikely to interfere with medication dosing.  Will continue to monitor      CHARISMA on CPAP  Patient's wife to bring machine in.  Apparently uses APAP with pressures of 14-16.  Will provide our machine as backup if his is not available.      Coronary artery disease involving native coronary artery of native heart without angina pectoris s/p RCA stent  Continue home Plavix, atorvastatin.  Continue home Toprol-XL if blood pressure tolerates  Holding home Benicar in order to allow for titration of beta-blocker      Other hyperlipidemia  Continue home statin        VTE Risk Mitigation (From admission, onward)         Ordered     apixaban tablet 5 mg  2 times daily         05/13/22 1353     IP VTE HIGH RISK PATIENT  Once         05/13/22 1353     Place sequential compression device  Until discontinued         05/13/22 1353              Critical care time spent on the evaluation and treatment of severe organ dysfunction,  review of pertinent labs and imaging studies, discussions with consulting providers and discussions with patient/family: 40 minutes.     Bhaskar Soliz MD  Department of Hospital Medicine   [facility] [place] - Intensive Care

## 2022-05-13 NOTE — ED NOTES
Unable to register BP in triage will place in bed obtain EKG and BP in room. Per family was sent home on 4LNC, she increased to 5LNC when he complained of SOB. States H/O AFIB

## 2022-05-13 NOTE — ED TRIAGE NOTES
Pt to the ED with complaints of worsening shortness of breath since this morning. Pt states it feels like he couldn't get a full breath of air. Pt is on home use O2 at 4L. Pt's daughter raised pt's O2 at home to 5L, she checked his O2 sat and it was 99%. Pt's daughter reports that pt's heart rate was also between 133-170bpm. Pt had surgery 10 days ago to remove lymph nodes from right side of neck due to squamous cell carcinoma. Pt denies chest pain, abdominal pain, N/V/D, fever/chills, weakness.

## 2022-05-13 NOTE — ASSESSMENT & PLAN NOTE
Continue home Plavix, atorvastatin.  Continue home Toprol-XL if blood pressure tolerates  Holding home Benicar in order to allow for titration of beta-blocker

## 2022-05-13 NOTE — ASSESSMENT & PLAN NOTE
-status post radiation by pellet placement in 2018  -holding home Flomax and Ditropan given hypotension  -monitor urine output and symptoms of retention.  P.r.n. straight cath

## 2022-05-13 NOTE — PROGRESS NOTES
Full consult note to follow  Pt seen and examined this am    S/p recent radical neck dissection  Increased erythema and swelling lower part of incision from time of discharge     Recommend MRSA coverage iv abx for now

## 2022-05-13 NOTE — PROGRESS NOTES
.Presenting Hospital / Clinic: [facility]  Virtual Tumor Board Conference: Virtual  Presenter: Dr. Higgins  Date Presented to Tumor Board: 5/12/2022  Specialties Present: Medical Oncology; Radiation Oncology; Head and Neck; Nutrition; Speech Pathology; Radiology  Presentation at Cancer Conference: Prospective  Cancer Type: Head and neck cancer  Recommended Plan Note: chemoradiation

## 2022-05-13 NOTE — ASSESSMENT & PLAN NOTE
Patient's wife to bring machine in.  Apparently uses APAP with pressures of 14-16.  Will provide our machine as backup if his is not available.

## 2022-05-13 NOTE — ACP (ADVANCE CARE PLANNING)
Advance Care Planning     Date: 05/13/2022      Code Status: Full Code     Patient confirms an interest in undergoing CPR and/or intubation if these measures were needed for life saving/extending purposes.  This seems to be at odds with previous documentation from a living will that he signed in 2012. This issue may need to be revisited, especially if clinical situation worsens.      Length of ACP   conversation in minutes: 10 minutes

## 2022-05-13 NOTE — SUBJECTIVE & OBJECTIVE
"Past Medical History:   Diagnosis Date    (HFpEF) heart failure with preserved ejection fraction 2/4/2022    NAT (acute kidney injury) 3/19/2017    ALLERGIC RHINITIS     Anemia     Anxiety     Basal cell carcinoma 10/19/2018    forehead and right medial shoulder    Basal cell carcinoma 01/09/2019    left nasal bridge and left posterior ear    Basal cell carcinoma 06/12/2020    left lower medial leg - Efudex    Basal cell carcinoma 11/30/2020    left medial upper eyelid canthus superficial BCC    Chronic rhinitis 5/3/2013    Chronic rhinitis 5/3/2013    Coronary artery disease involving native coronary artery of native heart without angina pectoris s/p RCA stent     Cortical cataract of both eyes 3/18/2016    Delayed sleep phase syndrome 3/13/2019    Depression     Erectile dysfunction 3/24/2014    Erectile dysfunction 3/24/2014    Essential hypertension     GERD (gastroesophageal reflux disease) 7/25/2012    Gout, arthritis     Grade III open fracture of left tibia and fibula s/p ex-fix on 7/1/16 and ORIF of left tibia on 7/15 7/6/2016    H/O: iritis     Helicobacter pylori (H. pylori) infection     Treated    Hepatitis     Herpes simplex keratoconjunctivitis 9/30/2015    - on acyclovir - followed by opthalmology, Dr. Uribe     Herpes simplex keratoconjunctivitis 9/30/2015    - on acyclovir - followed by opthalmology, Dr. Uribe     Hyperkalemia 2/28/2017    Hyperlipidemia     Hypogonadism male     Hypogonadism male     Keloid cicatrix     Mixed anxiety and depressive disorder     Morbid obesity     Obstructive sleep apnea on CPAP     CPAP    Osteoarthritis of left knee 7/25/2012    Paroxysmal atrial fibrillation 7/6/2016    Primary osteoarthritis of left knee 7/25/2012    Prominent aorta 1/25/2016    "RESULTS: THE HEART IS MILDLY ENLARGED WITH A SLIGHTLY PROMINENT AORTA" - Xray Chest PA & Lateral 12-     Prostate cancer 2/15/2016    - followed by urology, Dr. Young     Prostate cancer 2/15/2016    - " followed by urology, Dr. 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    chest and forehead    Squamous cell cancer of skin of nose     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 7/6/2016    Type III open fracture of left tibia and fibula with routine healing 7/6/2016    Vitamin D deficiency disease     Vitreous detachment 3/18/2016       Past Surgical History:   Procedure Laterality Date    ADJACENT TISSUE TRANSFER Left 11/30/2020    Procedure: ADJACENT TISSUE TRANSFER x2 ;  Surgeon: Teresa Cooper MD;  Location: [facility] OR 1ST FLR;  Service: Ophthalmology;  Laterality: Left;  Left glabellar 8.5x11x3 and Left upper eyelid advancement flap 13m52s3      Cardiac stenting x2      CATARACT EXTRACTION W/  INTRAOCULAR LENS IMPLANT Right 3/29/2016    Dr. Conteh    CATARACT EXTRACTION W/  INTRAOCULAR LENS IMPLANT Left 4/12/2016        COLONOSCOPY N/A 7/23/2020    Procedure: COLONOSCOPY;  Surgeon: Nico Lackey MD;  Location: [facility] (2ND FLR);  Service: Endoscopy;  Laterality: N/A;    DIRECT LARYNGOSCOPY N/A 5/4/2022    Procedure: LARYNGOSCOPY, DIRECT possible biopsy;  Surgeon: Etta Higgins MD;  Location: [facility] OR;  Service: ENT;  Laterality: N/A;  RN PREOP ON 4/29/22. NEEDS T&S DAY OF SURGERY--NF  CONSENTS DAY OF SURGERY    DISSECTION OF NECK Right 5/4/2022    Procedure: RADICAL NECK DISSECTION;  Surgeon: Etta Higgins MD;  Location: [facility] OR;  Service: ENT;  Laterality: Right;  TO FOLLOW DR ALMAZAN    ESOPHAGOGASTRODUODENOSCOPY N/A 7/23/2020    Procedure: EGD (ESOPHAGOGASTRODUODENOSCOPY);  Surgeon: Nico Lackey MD;  Location: [facility] (2ND FLR);  Service: Endoscopy;  Laterality: N/A;  per Dr Lackey-Will proceed with EGD and  "colonoscopy on the 2nd floor due to his comorbidities including obesity, sleep apnea, restrictive lung disease, coronary artery disease.  has loop recorder      ok to hold Eliquis 2 days per Dr Sandhu-must remain    EXTERNAL FIXATION TIBIAL FRACTURE Left 07/01/2016    INSERTION OF IMPLANTABLE LOOP RECORDER  04/07/2017    LEFT HEART CATHETERIZATION Left 1/30/2020    Procedure: Left heart cath;  Surgeon: Benjamin Sandhu MD;  Location: [facility] CATH LAB;  Service: Cardiology;  Laterality: Left;  RN PREOP 1/28/20--Pt starting Plavix loading dose today (8pills)- Dr. Sandhu aware.  Pt has a bandaged "non healing area to LLE"--Dr. Sandhu aware.    LEFT HEART CATHETERIZATION Left 2/14/2020    Procedure: Left heart cath, IVUS guided left main / LAD PCI. Noon start, radial approach;  Surgeon: Miguel Angel Brady MD;  Location: [facility] CATH LAB;  Service: Cardiology;  Laterality: Left;  RN PRE OP 2-7-2020  BMI--45.61    ORIF TIBIA FRACTURE Left 07/15/2016    PROBING OF NASOLACRIMAL DUCT WITH INSERTION OF TUBE Left 11/30/2020    Procedure: PROBING, NASOLACRIMAL DUCT,;  Surgeon: Teresa Cooper MD;  Location: [facility] OR [address] [place] [address];  Service: Ophthalmology;  Laterality: Left;    RADIOACTIVE SEED IMPLANTATION OF PROSTATE N/A 8/8/2018    Procedure: INSERTION, RADIOACTIVE SEED, PROSTATE;  Surgeon: Bipin Thompson MD;  Location: [facility] OR [address] [place] [address];  Service: Urology;  Laterality: N/A;  1 hour    SKIN BIOPSY      Squamous cell cancer removal x3 with Mohs surgery      TONSILLECTOMY      TOTAL KNEE ARTHROPLASTY  10/2012    TRIGGER FINGER RELEASE Right 10/14/2020    Procedure: RELEASE, TRIGGER FINGER - right;  Surgeon: Rad Swift MD;  Location: [facility] OR;  Service: Orthopedics;  Laterality: Right;    trus/bx      ULTRASOUND GUIDANCE  2/14/2020    Procedure: Ultrasound Guidance;  Surgeon: Miguel Angel Brady MD;  Location: [facility] CATH LAB;  Service: Cardiology;;       Review of patient's allergies indicates:   Allergen Reactions    Ciprofloxacin Rash     Diffuse " pruritic morbilliform rash developed 3/15/2017 after dose of cipro; previously in 2/2017 he had rash/fevers after initiation of cipro    Zosyn [piperacillin-tazobactam] Rash     Diffuse pruritic morbilliform rash developed 3/15/2017.  Then, 430am dose on 3/16 and rash worsened with SOB/tachypnea but no hypoxemia.     Bacitracin Itching and Rash     Violaceous rash in area of topical Tx.        No current facility-administered medications on file prior to encounter.     Current Outpatient Medications on File Prior to Encounter   Medication Sig    albuterol (VENTOLIN HFA) 90 mcg/actuation inhaler Inhale 2 puffs into the lungs every 6 (six) hours as needed for Wheezing. Rescue    albuterol-ipratropium (DUO-NEB) 2.5 mg-0.5 mg/3 mL nebulizer solution Take 3 mLs by nebulization every 4 (four) hours as needed for Wheezing or Shortness of Breath. Rescue    artificial tears (ISOPTO TEARS) 0.5 % ophthalmic solution Place 1 drop into both eyes as needed.    atorvastatin (LIPITOR) 40 MG tablet Take 1 tablet (40 mg total) by mouth once daily. (Patient taking differently: Take 40 mg by mouth.)    clopidogreL (PLAVIX) 75 mg tablet Take 1 tablet by mouth once daily    CYANOCOBALAMIN, VITAMIN B-12, (VITAMIN B-12 ORAL) Take 2,500 mcg by mouth once daily.    dextromethorphan-guaiFENesin  mg (MUCINEX DM)  mg per 12 hr tablet Take 1 tablet by mouth 2 (two) times daily. for 10 days    ELIQUIS 5 mg Tab Take 1 tablet by mouth twice daily    fluticasone propionate (FLONASE) 50 mcg/actuation nasal spray 1 spray (50 mcg total) by Each Nostril route once daily.    furosemide (LASIX) 20 MG tablet Take 1 tablet (20 mg total) by mouth once daily.    gabapentin (NEURONTIN) 300 MG capsule Take 1 capsule (300 mg total) by mouth 3 (three) times daily.    melatonin 5 mg Tab Take 10 mg by mouth nightly.    metoprolol succinate (TOPROL-XL) 25 MG 24 hr tablet Take 1 tablet by mouth once daily    nitroGLYCERIN (NITROSTAT) 0.4 MG SL tablet  Place 1 tablet (0.4 mg total) under the tongue every 5 (five) minutes as needed for Chest pain.    olmesartan (BENICAR) 5 MG Tab Take 1 tablet (5 mg total) by mouth once daily.    oxybutynin (DITROPAN-XL) 5 MG TR24 Take 1 tablet (5 mg total) by mouth once daily.    oxyCODONE-acetaminophen (PERCOCET) 5-325 mg per tablet Take 2 tablets by mouth every 6 (six) hours as needed for Pain.    pantoprazole (PROTONIX) 40 MG tablet Take 1 tablet (40 mg total) by mouth once daily.    sildenafiL (VIAGRA) 100 MG tablet Take 1 tablet (100 mg total) by mouth daily as needed.    tamsulosin (FLOMAX) 0.4 mg Cap Take 1 capsule (0.4 mg total) by mouth once daily.    traZODone (DESYREL) 50 MG tablet Take 1 tablet (50 mg total) by mouth nightly as needed for Insomnia.    venlafaxine (EFFEXOR) 75 MG tablet Take 2 tablets (150 mg total) by mouth 2 (two) times daily.    vitamin D 1000 units Tab Take 1 tablet (1,000 Units total) by mouth once daily. (Patient taking differently: Take 2,000 Units by mouth once daily.)    [DISCONTINUED] acyclovir (ZOVIRAX) 800 MG Tab Take 1 tablet (800 mg total) by mouth 2 (two) times daily.    [DISCONTINUED] fluocinonide 0.05% (LIDEX) 0.05 % cream Apply topically 2 (two) times daily. Prn itch.  Stop using steroid topical when skin is smooth and non itchy.    [DISCONTINUED] omeprazole (PRILOSEC) 20 MG capsule Take 1 capsule (20 mg total) by mouth daily as needed.    [DISCONTINUED] triamcinolone acetonide 0.1% (KENALOG) 0.1 % ointment AAA lower legs bid - occlude qhs     Family History       Problem Relation (Age of Onset)    Alzheimer's disease Mother    Cancer Father, Sister, Brother    Hypertension Mother    Lung cancer Father    No Known Problems Sister, Maternal Aunt, Maternal Uncle, Paternal Aunt, Paternal Uncle, Maternal Grandmother, Maternal Grandfather, Paternal Grandmother, Paternal Grandfather    Peripheral vascular disease     Skin cancer Father          Tobacco Use    Smoking status: Never Smoker     Smokeless tobacco: Never Used   Substance and Sexual Activity    Alcohol use: Yes     Alcohol/week: 2.0 standard drinks     Types: 2 Cans of beer per week     Comment: occasionally, beer    Drug use: Never    Sexual activity: Yes     Partners: Female     Birth control/protection: None     Review of Systems   Unable to perform ROS: Acuity of condition   Objective:     Vital Signs (Most Recent):  Temp: 98.1 °F (36.7 °C) (05/13/22 0703)  Pulse: 99 (05/13/22 1252)  Resp: (!) 24 (05/13/22 1252)  BP: (!) 95/49 (05/13/22 1252)  SpO2: 97 % (05/13/22 1252)   Vital Signs (24h Range):  Temp:  [98.1 °F (36.7 °C)] 98.1 °F (36.7 °C)  Pulse:  [] 99  Resp:  [16-26] 24  SpO2:  [97 %-100 %] 97 %  BP: ()/(33-62) 95/49     Weight: (!) 158.8 kg (350 lb)  Body mass index is 47.47 kg/m².    SpO2: 97 %  O2 Device (Oxygen Therapy): room air      Intake/Output Summary (Last 24 hours) at 5/13/2022 1304  Last data filed at 5/13/2022 1221  Gross per 24 hour   Intake 2000 ml   Output --   Net 2000 ml       Lines/Drains/Airways       Peripheral Intravenous Line  Duration                  Peripheral IV - Single Lumen 05/13/22 0729 20 G Right Antecubital <1 day         Peripheral IV - Single Lumen 05/13/22 0744 20 G Left Antecubital <1 day                    Physical Exam  Constitutional:       Appearance: He is well-developed.   HENT:      Head: Normocephalic and atraumatic.   Eyes:      Conjunctiva/sclera: Conjunctivae normal.      Pupils: Pupils are equal, round, and reactive to light.   Cardiovascular:      Rate and Rhythm: Tachycardia present. Rhythm irregular.      Pulses: Intact distal pulses.      Heart sounds: Normal heart sounds.   Pulmonary:      Effort: Pulmonary effort is normal.      Breath sounds: Wheezing present.   Abdominal:      General: Bowel sounds are normal.      Palpations: Abdomen is soft.   Musculoskeletal:         General: Normal range of motion.      Cervical back: Normal range of motion and neck supple.       Right lower leg: Edema present.      Left lower leg: Edema present.   Skin:     General: Skin is warm and dry.   Neurological:      Mental Status: He is alert and oriented to person, place, and time.       Significant Labs: All pertinent lab results from the last 24 hours have been reviewed.    Significant Imaging: Echocardiogram: 2D echo with color flow doppler: No results found. However, due to the size of the patient record, not all encounters were searched. Please check Results Review for a complete set of results.

## 2022-05-13 NOTE — ED PROVIDER NOTES
Encounter Date: 5/13/2022    SCRIBE #1 NOTE: I, Annika Booth, am scribing for, and in the presence of,  Jaziel Mata Jr., MD. I have scribed the following portions of the note - Other sections scribed: HPI, ROS.       History     Chief Complaint   Patient presents with    Shortness of Breath     Patient comes in with increased SOB, states hard to keep a breath feels as if needs to take another before releasing previous breath. Patient had right neck lymph nodes removed due to Ssca on Wednesday, denies difficulty swallowing.      77 year old male, with pertinent medical history of HLD, HTN, CAD, PVD, peroxysmal atrial fibrillation, basal cell carcinoma, and squamous cell carcinoma, presents to the ED accompanied by his step daughter and relative to evaluate his worsening shortness of breath that began this morning at 5:30 AM. Patient's step-daughter states that he had surgery on Wednesday for his SCC and was discharged with no concerns. Patient had been doing well until this morning. Heart rate was measured by family on pulse oximeter, ranging from 126 to 177 bpm. Patient states he cannot feel the palpitations or increased heart rate. He reports an intermittent cough. Patient attempted treatment with an inhaler and breating treatment every 4 hours but no relief. Patient denies pain at surgery site or any new redness. Patient was prescribed with new medications post-surgery and has been compliant. Patient's step-daughter reports that the patient forgot to take other medication yesterday morning and only took those scheduled for yesterday night. Patient's heart device for Afib has been out of battery and is no longer in use. He denies chest pain, fever, chills, N/V, abdominal pain, leg swelling, or other associated symptoms. No other exacerbating or alleviating factors.    The history is provided by the patient and a relative. No  was used.     Review of patient's allergies indicates:    Allergen Reactions    Ciprofloxacin Rash     Diffuse pruritic morbilliform rash developed 3/15/2017 after dose of cipro; previously in 2/2017 he had rash/fevers after initiation of cipro    Zosyn [piperacillin-tazobactam] Rash     Diffuse pruritic morbilliform rash developed 3/15/2017.  Then, 430am dose on 3/16 and rash worsened with SOB/tachypnea but no hypoxemia.     Bacitracin Itching and Rash     Violaceous rash in area of topical Tx.      Past Medical History:   Diagnosis Date    (HFpEF) heart failure with preserved ejection fraction 2/4/2022    NAT (acute kidney injury) 3/19/2017    ALLERGIC RHINITIS     Anemia     Anxiety     Basal cell carcinoma 10/19/2018    forehead and right medial shoulder    Basal cell carcinoma 01/09/2019    left nasal bridge and left posterior ear    Basal cell carcinoma 06/12/2020    left lower medial leg - Efudex    Basal cell carcinoma 11/30/2020    left medial upper eyelid canthus superficial BCC    Chronic rhinitis 5/3/2013    Chronic rhinitis 5/3/2013    Coronary artery disease involving native coronary artery of native heart without angina pectoris s/p RCA stent     Cortical cataract of both eyes 3/18/2016    Delayed sleep phase syndrome 3/13/2019    Depression     Erectile dysfunction 3/24/2014    Erectile dysfunction 3/24/2014    Essential hypertension     GERD (gastroesophageal reflux disease) 7/25/2012    Gout, arthritis     Grade III open fracture of left tibia and fibula s/p ex-fix on 7/1/16 and ORIF of left tibia on 7/15 7/6/2016    H/O: iritis     Helicobacter pylori (H. pylori) infection     Treated    Hepatitis     Herpes simplex keratoconjunctivitis 9/30/2015    - on acyclovir - followed by opthalmology, Dr. Uribe     Herpes simplex keratoconjunctivitis 9/30/2015    - on acyclovir - followed by opthalmologyDr. Uribe     Hyperkalemia 2/28/2017    Hyperlipidemia     Hypogonadism male     Hypogonadism male     Keloid cicatrix      "Mixed anxiety and depressive disorder     Morbid obesity     Obstructive sleep apnea on CPAP     CPAP    Osteoarthritis of left knee 7/25/2012    Paroxysmal atrial fibrillation 7/6/2016    Primary osteoarthritis of left knee 7/25/2012    Prominent aorta 1/25/2016    "RESULTS: THE HEART IS MILDLY ENLARGED WITH A SLIGHTLY PROMINENT AORTA" - Xray Chest PA & Lateral 12-     Prostate cancer 2/15/2016    - followed by urology, Dr. Young     Prostate cancer 2/15/2016    - followed by urology, Dr. 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    chest and forehead    Squamous cell cancer of skin of nose     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 7/6/2016    Type III open fracture of left tibia and fibula with routine healing 7/6/2016    Vitamin D deficiency disease     Vitreous detachment 3/18/2016     Past Surgical History:   Procedure Laterality Date    ADJACENT TISSUE TRANSFER Left 11/30/2020    Procedure: ADJACENT TISSUE TRANSFER x2 ;  Surgeon: Teresa Cooper MD;  Location: [facility] 1ST FLR;  Service: Ophthalmology;  Laterality: Left;  Left glabellar 8.5x11x3 and Left upper eyelid advancement flap [id number]      Cardiac stenting x2      CATARACT EXTRACTION W/  INTRAOCULAR LENS IMPLANT Right 3/29/2016    Dr. Conteh    CATARACT EXTRACTION W/  INTRAOCULAR LENS IMPLANT Left 4/12/2016        COLONOSCOPY N/A 7/23/2020    Procedure: COLONOSCOPY;  Surgeon: Nico Lackey MD;  Location: [facility] (2ND FLR);  Service: Endoscopy;  Laterality: N/A;    DIRECT LARYNGOSCOPY N/A 5/4/2022    Procedure: LARYNGOSCOPY, DIRECT possible biopsy;  Surgeon: Etta Higgins MD;  Location: [facility] OR;  Service: ENT;  Laterality: " "N/A;  RN PREOP ON 4/29/22. NEEDS T&S DAY OF SURGERY--NF  CONSENTS DAY OF SURGERY    DISSECTION OF NECK Right 5/4/2022    Procedure: RADICAL NECK DISSECTION;  Surgeon: Etta Higgins MD;  Location: [facility] OR;  Service: ENT;  Laterality: Right;  TO FOLLOW DR ALMAZAN    ESOPHAGOGASTRODUODENOSCOPY N/A 7/23/2020    Procedure: EGD (ESOPHAGOGASTRODUODENOSCOPY);  Surgeon: Nico Lackey MD;  Location: [facility] (2ND FLR);  Service: Endoscopy;  Laterality: N/A;  per Dr Lackey-Will proceed with EGD and colonoscopy on the 2nd floor due to his comorbidities including obesity, sleep apnea, restrictive lung disease, coronary artery disease.  has loop recorder      ok to hold Eliquis 2 days per Dr Sandhu-must remain    EXTERNAL FIXATION TIBIAL FRACTURE Left 07/01/2016    INSERTION OF IMPLANTABLE LOOP RECORDER  04/07/2017    LEFT HEART CATHETERIZATION Left 1/30/2020    Procedure: Left heart cath;  Surgeon: Benjamin Sandhu MD;  Location: [facility] CATH LAB;  Service: Cardiology;  Laterality: Left;  RN PREOP 1/28/20--Pt starting Plavix loading dose today (8pills)- Dr. Sandhu aware.  Pt has a bandaged "non healing area to LLE"--Dr. Sandhu aware.    LEFT HEART CATHETERIZATION Left 2/14/2020    Procedure: Left heart cath, IVUS guided left main / LAD PCI. Noon start, radial approach;  Surgeon: Miguel Angel Brady MD;  Location: [facility] CATH LAB;  Service: Cardiology;  Laterality: Left;  RN PRE OP 2-7-2020  BMI--45.61    ORIF TIBIA FRACTURE Left 07/15/2016    PROBING OF NASOLACRIMAL DUCT WITH INSERTION OF TUBE Left 11/30/2020    Procedure: PROBING, NASOLACRIMAL DUCT,;  Surgeon: Teresa Cooper MD;  Location: [facility] OR 1ST FLR;  Service: Ophthalmology;  Laterality: Left;    RADIOACTIVE SEED IMPLANTATION OF PROSTATE N/A 8/8/2018    Procedure: INSERTION, RADIOACTIVE SEED, PROSTATE;  Surgeon: Bipin Thompson MD;  Location: [facility] OR 1ST FLR;  Service: Urology;  Laterality: N/A;  1 hour    SKIN BIOPSY      Squamous cell cancer removal x3 with " Mohs surgery      TONSILLECTOMY      TOTAL KNEE ARTHROPLASTY  10/2012    TRIGGER FINGER RELEASE Right 10/14/2020    Procedure: RELEASE, TRIGGER FINGER - right;  Surgeon: Rad Swift MD;  Location: [facility] OR;  Service: Orthopedics;  Laterality: Right;    trus/bx      ULTRASOUND GUIDANCE  2/14/2020    Procedure: Ultrasound Guidance;  Surgeon: Miguel Angel Brady MD;  Location: [facility] CATH LAB;  Service: Cardiology;;     Family History   Problem Relation Age of Onset    Skin cancer Father     Lung cancer Father     Cancer Father         smoker,     Alzheimer's disease Mother     Hypertension Mother     Cancer Sister         colon, lung cancer     No Known Problems Sister     Cancer Brother         skin cancer, polypectomy     Peripheral vascular disease Unknown     No Known Problems Maternal Aunt     No Known Problems Maternal Uncle     No Known Problems Paternal Aunt     No Known Problems Paternal Uncle     No Known Problems Maternal Grandmother     No Known Problems Maternal Grandfather     No Known Problems Paternal Grandmother     No Known Problems Paternal Grandfather     Melanoma Neg Hx     Psoriasis Neg Hx     Lupus Neg Hx     Eczema Neg Hx     Amblyopia Neg Hx     Blindness Neg Hx     Cataracts Neg Hx     Diabetes Neg Hx     Glaucoma Neg Hx     Macular degeneration Neg Hx     Retinal detachment Neg Hx     Strabismus Neg Hx     Stroke Neg Hx     Thyroid disease Neg Hx     Acne Neg Hx      Social History     Tobacco Use    Smoking status: Never Smoker    Smokeless tobacco: Never Used   Substance Use Topics    Alcohol use: Yes     Alcohol/week: 2.0 standard drinks     Types: 2 Cans of beer per week     Comment: occasionally, beer    Drug use: Never     Review of Systems   Constitutional: Negative for chills and fever.   Respiratory: Positive for cough and shortness of breath. Negative for chest tightness.    Cardiovascular: Negative for chest pain, palpitations and leg  swelling.   Gastrointestinal: Negative for abdominal pain, nausea and vomiting.   All other systems reviewed and are negative.      Physical Exam     Initial Vitals   BP Pulse Resp Temp SpO2   05/13/22 0721 05/13/22 0703 05/13/22 0703 05/13/22 0703 05/13/22 0703   (!) 123/55 (!) 238 (!) 22 98.1 °F (36.7 °C) 97 %      MAP       --                Physical Exam    Nursing note and vitals reviewed.  Constitutional: He appears well-developed and well-nourished. He is not diaphoretic. No distress.   HENT:   Head: Normocephalic and atraumatic.   Right Ear: External ear normal.   Left Ear: External ear normal.   Nose: Nose normal.   Mouth/Throat: Oropharynx is clear and moist.   Eyes: Conjunctivae and EOM are normal. Pupils are equal, round, and reactive to light. Right eye exhibits no discharge. Left eye exhibits no discharge. No scleral icterus.   Neck: Neck supple. No JVD present.   There is a well-healing surgical vertical wound to the lateral portion of the neck.  There is no active drainage from the area.  There is a portion of erythematous skin inferiorly adjacent to the wound (daughter says this is unchanged since surgery.   Normal range of motion.  Cardiovascular: Normal heart sounds and intact distal pulses. Exam reveals no gallop and no friction rub.    No murmur heard.  Tachycardic, irregularly irregular rhythm.   Pulmonary/Chest: Breath sounds normal. No stridor. No respiratory distress. He has no wheezes. He has no rhonchi. He has no rales. He exhibits no tenderness.   There are scattered rales in the bilateral lower lobes.   Abdominal: Abdomen is soft. Bowel sounds are normal. He exhibits no distension and no mass. There is no abdominal tenderness. There is no rebound and no guarding.   Musculoskeletal:         General: No tenderness or edema. Normal range of motion.      Cervical back: Normal range of motion and neck supple.     Neurological: He is alert and oriented to person, place, and time. He has normal  strength. No cranial nerve deficit or sensory deficit.   Skin: Skin is warm and dry. No rash noted. No erythema. No pallor.   Psychiatric: He has a normal mood and affect. His behavior is normal. Judgment and thought content normal.               ED Course   Critical Care    Date/Time: 5/13/2022 1:27 PM  Performed by: Jaziel Mata Jr., MD  Authorized by: Bhaskar Soliz MD   Direct patient critical care time: 10 minutes  Additional history critical care time: 10 minutes  Ordering / reviewing critical care time: 5 minutes  Documentation critical care time: 5 minutes  Consulting other physicians critical care time: 5 minutes  Consult with family critical care time: 5 minutes  Total critical care time (exclusive of procedural time) : 40 minutes  Critical care was necessary to treat or prevent imminent or life-threatening deterioration of the following conditions: circulatory failure.  Critical care was time spent personally by me on the following activities: development of treatment plan with patient or surrogate, discussions with consultants, interpretation of cardiac output measurements, evaluation of patient's response to treatment, examination of patient, obtaining history from patient or surrogate, ordering and performing treatments and interventions, ordering and review of radiographic studies, ordering and review of laboratory studies, pulse oximetry, re-evaluation of patient's condition and review of old charts.        Labs Reviewed   COMPREHENSIVE METABOLIC PANEL - Abnormal; Notable for the following components:       Result Value    Glucose 116 (*)     BUN 25 (*)     Creatinine 1.8 (*)     Albumin 3.2 (*)     eGFR if  42 (*)     eGFR if non  36 (*)     All other components within normal limits   CBC W/ AUTO DIFFERENTIAL - Abnormal; Notable for the following components:    RBC 3.30 (*)     Hemoglobin 11.3 (*)     Hematocrit 34.6 (*)      (*)     MCH 34.2 (*)      "Gran # (ANC) 8.2 (*)     All other components within normal limits   TROPONIN I - Abnormal; Notable for the following components:    Troponin I 0.046 (*)     All other components within normal limits   CULTURE, BLOOD   CULTURE, BLOOD   B-TYPE NATRIURETIC PEPTIDE   LACTIC ACID, PLASMA   SARS-COV-2 RDRP GENE     EKG Readings: (Independently Interpreted)   Initial Reading: No STEMI. Ectopy: No Ectopy.   EKG reviewed and interpreted by me shows atrial fibrillation with rapid ventricular response at a rate of 131.  The QRS and QTC intervals are normal.  There is normal axis.  There is normal R-wave progression across the precordium.  There are ST depressions in the inferior leads.         Imaging Results          X-Ray Chest AP Portable (Final result)  Result time 05/13/22 07:52:00    Final result by Walker Silva MD (05/13/22 07:52:00)                 Impression:      No detrimental change when compared with 05/05/2022.      Electronically signed by: Walker Silva MD  Date:    05/13/2022  Time:    07:52             Narrative:    EXAMINATION:  XR CHEST AP PORTABLE    CLINICAL HISTORY:  Provided history is "  Shortness of breath".    TECHNIQUE:  One view of the chest.    COMPARISON:  05/05/2022.    FINDINGS:  Cardiac wires overlie the chest.  Cardiac loop recorder device overlies the left lower chest wall.  Surgical clips overlie the right neck.  Cardiac silhouette is stable.  There are coarsened bibasilar interstitial lung markings and scattered calcified pleural plaques.  Persistent left basilar subsegmental atelectasis versus pleural effusion with blunting of the left costophrenic angle.  No sizable right pleural effusion.  No pneumothorax.                              X-Rays:   Independently Interpreted Readings:   Other Readings:  Chest x-ray reviewed by me reveals no convincing evidence of significant new infiltrate, consolidation, pulmonary edema, pleural effusion, or pneumothorax.    Medications "   amiodarone 360 mg/200 mL (1.8 mg/mL) infusion (1 mg/min Intravenous New Bag 5/13/22 1253)   amiodarone 360 mg/200 mL (1.8 mg/mL) infusion (has no administration in time range)   sodium chloride 0.9% bolus 1,000 mL (0 mLs Intravenous Stopped 5/13/22 0845)   metoprolol injection 2.5 mg (2.5 mg Intravenous Given 5/13/22 0851)   sodium chloride 0.9% bolus 500 mL (0 mLs Intravenous Stopped 5/13/22 0938)   metoprolol injection 2.5 mg (2.5 mg Intravenous Given 5/13/22 1022)   lactated ringers bolus 500 mL (0 mLs Intravenous Stopped 5/13/22 1221)   metoprolol succinate (TOPROL-XL) 24 hr tablet 25 mg (25 mg Oral Given 5/13/22 1113)   amiodarone in dextrose 150 mg/100 mL (1.5 mg/mL) loading dose 150 mg (0 mg Intravenous Stopped 5/13/22 1317)     Medical Decision Making:   History:   Old Medical Records: I decided to obtain old medical records.  Independently Interpreted Test(s):   I have ordered and independently interpreted EKG Reading(s) - see prior notes  Clinical Tests:   Lab Tests: Ordered  Medical Tests: Ordered  ED Management:  This is the emergent evaluation of a 77-year-old male presents emergency department for evaluation of shortness of breath started this morning.  Patient states that he feels that it is hard to take a deep breath.  Differential diagnosis at the time of initial evaluation included, but was not limited to:  Cardiac dysrhythmia, CHF exacerbation, pneumonia, pneumothorax.  Also considered, but doubt pulmonary embolus.   Patient has a slight increase in creatinine.  He does not have any evidence to suggest acute coronary syndrome.  He does have atrial fibrillation with rapid ventricular response.  Initially, he was hypotensive.  He had a appeared volume depleted.  I gave him IV fluids.  I reviewed his most recent echocardiogram revealed no systolic or diastolic dysfunction.  Chest x-ray revealed no acute abnormalities.  I discussed the case with Dr. Sandhu.  He advised giving small doses of  Lopressor for rate control.  I did this once the blood pressure had improved.   There were no sustained improvement in heart rate despite multiple doses of Lopressor.  I also gave the patient his home dose of Toprol-XL.  After further discussion with Dr. Sandhu, decision made to admit this patient on amiodarone drip.  I also discussed the case with Dr. Higgins, who has seen and evaluated the patient in the emergency department.  Recommends antibiotics for redness around surgical wound though is not convinced that is definitely infection.  I have discussed all of the above with Dr. Soliz who will admit the patient to the ICU.  After discussion of antibiotic choice, this has been deferred to the admitting team.  Patient and family updated at bedside.          Scribe Attestation:   Scribe #1: I performed the above scribed service and the documentation accurately describes the services I performed. I attest to the accuracy of the note.                 Clinical Impression:   Final diagnoses:  [R06.02] Shortness of breath  [I48.91] Atrial fibrillation with rapid ventricular response (Primary)          ED Disposition Condition    Admit           I, Jaziel Mata MD, personally performed the services described in this documentation. All medical record entries made by the scribe were at my direction and in my presence. I have reviewed the chart and agree that the record reflects my personal performance and is accurate and complete.     Jaziel Mata Jr., MD  05/13/22 [id number]

## 2022-05-13 NOTE — ASSESSMENT & PLAN NOTE
-some erythema and minimal drainage at wound site of recent neck dissection  -started Bactrim.  Would plan for 10-14 day course  -ENT consulted to evaluate

## 2022-05-13 NOTE — ASSESSMENT & PLAN NOTE
Hx PAF on eliquis. Currently still with A-fib on IV amiodarone. Borderline low BP. Will discuss MICHELLE/CV if A-fib persists - needs MICHELLE due to interruption of eliquis for recent neck surgery. Check echo

## 2022-05-13 NOTE — ASSESSMENT & PLAN NOTE
Patient with Paroxysmal (<7 days) atrial fibrillation which is uncontrolled currently with Beta Blocker and Amiodarone. Patient is currently in atrial fibrillation.[id number] Score: 3. Anticoagulation indicated. Anticoagulation done with eliquis.  Will titrate metoprolol as able given BP.  Appreciate cardiology recs.  Started on amiodarone drip, rate improved.  NPO after midnight for possible cardioversion.

## 2022-05-13 NOTE — ASSESSMENT & PLAN NOTE
-does not appear to be in acute renal failure.  Creatinine clearance sufficient at present so as unlikely to interfere with medication dosing.  Will continue to monitor
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
## 2022-05-14 PROBLEM — J96.01 ACUTE HYPOXEMIC RESPIRATORY FAILURE: Status: ACTIVE | Noted: 2022-01-01

## 2022-05-14 NOTE — NURSING
Patient is in the supine position.   The body was positioned using the following devices: safety strap.  The head was positioned using the following devices: regular pillow.  The left arm was positioned using the following devices: arm board.  The right arm was positioned using the following devices: arm board.  The left leg was positioned using the following devices: blanket.  The right leg was positioned using the following devices: blanket.   Pt claims he is having difficulty breathing. NC at 4 liters and O2 sats are 94%. Called RT for a breathing treatment. Will continue to monitor.

## 2022-05-14 NOTE — ASSESSMENT & PLAN NOTE
Patient with Hypoxic Respiratory failure which is Acute.  he is not on home oxygen. Supplemental oxygen was provided and noted- Oxygen Concentration (%):  [36-40] 40.   Signs/symptoms of respiratory failure include- tachypnea, increased work of breathing and respiratory distress. Contributing diagnoses includes - CHF and Obesity Hypoventilation Labs and images were reviewed. Patient Has not had a recent ABG. Will treat underlying causes and adjust management of respiratory failure as follows-   -patient with restrictive lung disease due to obesity and pleural changes related to asbestos exposure  -currently requiring supplemental O2 to maintain normal SpO2.  Had desaturation episode to mid 80s while at rest  -will need walk test for evaluation for home O2

## 2022-05-14 NOTE — PT/OT/SLP PROGRESS
Occupational Therapy      Patient Name:  Janusz Montgomery Jr.   MRN:  [id number]    Patient not seen today secondary to patient c/o difficulty breathing.  Will follow-up 05/15/2022.  5/14/2022

## 2022-05-14 NOTE — ASSESSMENT & PLAN NOTE
-some erythema and minimal drainage at wound site of recent neck dissection  -started doxycycline.  Would plan for 10-14 day course  -ENT consulted to evaluate

## 2022-05-14 NOTE — NURSING
Pt is aaox4. VSS. Afib. Amio gtt infusing at 0.5mg. Pt had a c/o of headache was given tylenol. Prn duoneb ordered for c/o wheezing and sob. Pt wore cpap for the night. Pt sats were >88% overnight. Pt urinated 750ml. Call light in reach. Safety maintained. Urinal in reach.

## 2022-05-14 NOTE — PROVIDER TRANSFER
77-year-old gentleman with morbid obesity (BMI 45), CAD status post 3 stents, hypertension, PAD, paroxysmal atrial fibrillation on Eliquis, CHARISMA on CPAP q.h.s. (14 cm H2O), asbestosis, CKD 3, history of prostate cancer status post resection, depression, head and neck tumor status post radical neck dissection 1.5 weeks ago. Patient admitted for atrial fibrillation with rapid ventricular response.  Evaluated by Cardiology.  Started on amiodarone drip.  Spontaneously converted to sinus rhythm at around 6:00 p.m. on 05/13.  Amiodarone switched to p.o..  Patient developed hypoxic respiratory failure, requiring low-flow supplemental O2 via nasal cannula to maintain normal SpO2.   Apparently, patient was requiring supplemental O2 at home after his recent surgery but this is a new development within the past couple of weeks.  I advised him against inhaled beta agonist usage as this likely helped precipitate his AFib RVR.  Patient seems to have significant anxiety that contributes to his dyspnea.  On home venlafaxine.  Patient has erythema at recent neck dissection wound site consistent with cellulitis.  Started on doxycycline.  ENT is consulted.

## 2022-05-14 NOTE — SUBJECTIVE & OBJECTIVE
Interval History:  Complaining of worse dyspnea this morning.  Denies palpitations.    Review of Systems   Constitutional:  Positive for activity change. Negative for appetite change, chills, fatigue and fever.   HENT:  Negative for congestion, rhinorrhea, sinus pressure, sinus pain and sore throat.    Eyes:  Negative for photophobia, pain and visual disturbance.   Respiratory:  Positive for shortness of breath. Negative for cough, chest tightness and wheezing.    Cardiovascular:  Negative for chest pain, palpitations and leg swelling.   Gastrointestinal:  Negative for abdominal distention, abdominal pain, constipation, diarrhea, nausea and vomiting.   Genitourinary:  Negative for dysuria, hematuria and urgency.   Musculoskeletal:  Negative for arthralgias, joint swelling and myalgias.   Skin:  Positive for color change and wound. Negative for pallor.   Neurological:  Negative for dizziness, weakness, numbness and headaches.   Hematological:  Does not bruise/bleed easily.   Psychiatric/Behavioral:  Negative for behavioral problems, confusion and hallucinations.    Objective:     Vital Signs (Most Recent):  Temp: (P) 97.7 °F (36.5 °C) (05/14/22 0800)  Pulse: 76 (05/14/22 0900)  Resp: (!) 24 (05/14/22 0900)  BP: (!) 179/74 (05/14/22 0911)  SpO2: 99 % (05/14/22 0900)   Vital Signs (24h Range):  Temp:  [97.7 °F (36.5 °C)-98.5 °F (36.9 °C)] (P) 97.7 °F (36.5 °C)  Pulse:  [] 76  Resp:  [11-34] 24  SpO2:  [88 %-100 %] 99 %  BP: ()/(44-83) 179/74     Weight: (!) 155.3 kg (342 lb 6 oz)  Body mass index is 45.17 kg/m².    Intake/Output Summary (Last 24 hours) at 5/14/2022 1026  Last data filed at 5/14/2022 0900  Gross per 24 hour   Intake 756.2 ml   Output 1626 ml   Net -869.8 ml      Physical Exam  Vitals and nursing note reviewed.   Constitutional:       General: He is not in acute distress.     Appearance: He is well-developed. He is obese.   HENT:      Head: Normocephalic and atraumatic.      Right Ear:  External ear normal.      Left Ear: External ear normal.      Nose: Nose normal.   Eyes:      Conjunctiva/sclera: Conjunctivae normal.      Pupils: Pupils are equal, round, and reactive to light.   Cardiovascular:      Rate and Rhythm: Normal rate and regular rhythm.   Pulmonary:      Effort: Pulmonary effort is normal. No respiratory distress.      Breath sounds: Normal breath sounds. No wheezing or rales.      Comments: Limited lung volumes  Abdominal:      General: Bowel sounds are normal. There is no distension.      Palpations: Abdomen is soft.      Tenderness: There is no abdominal tenderness.   Musculoskeletal:         General: No tenderness. Normal range of motion.      Cervical back: Normal range of motion and neck supple.      Right lower leg: No edema.      Left lower leg: No edema.   Skin:     General: Skin is warm and dry.      Comments: Erythema at right chest adjacent to right neck wound   Neurological:      Mental Status: He is alert and oriented to person, place, and time.   Psychiatric:         Thought Content: Thought content normal.       Significant Labs: All pertinent labs within the past 24 hours have been reviewed.    Significant Imaging: I have reviewed all pertinent imaging results/findings within the past 24 hours.

## 2022-05-14 NOTE — NURSING
Pt arrive to the unit by wheelchair accompanied by transport and Marco, ICU RN   Assisted pt to bed. Tele monitoring initiated. Pt is AAOx4.  NO distress noted.

## 2022-05-14 NOTE — HOSPITAL COURSE
Patient admitted for atrial fibrillation with rapid ventricular response.  Evaluated by Cardiology.  Started on amiodarone drip.  Spontaneously converted to sinus rhythm at around 6:00 p.m. on 05/13.  Amiodarone switched to p.o..  Patient still with acute on chronic hypoxic respiratory failure, requiring low-flow supplemental O2 via nasal cannula to maintain normal SpO2.  Patient has erythema at recent neck dissection wound site consistent with cellulitis.  Started on doxycycline. This has improved and appears stable. He was weaned down to 3 liters NC. Overall, he was feeling better and at baseline. Discussed with patient and family again about chronic shortness of breath being multi-factorial and this taking a long time to improve. Patient is motivated and has Pulmonary follow up. All questions answered, patient ambulating well and tolerating meals. He was discharged home to continue with Home health and follow up appointments.

## 2022-05-14 NOTE — NURSING
Upon entering room, pt found to have increased work of breathing. No prn breathing tx available. Notified eicu for new orders. Pt remains on 4LNC. Sats 100%.

## 2022-05-14 NOTE — PLAN OF CARE
"[facility] - Intensive Care  Initial Discharge Assessment       Primary Care Provider: Miguelina Weathers MD    Admission Diagnosis: Shortness of breath [R06.02]  A-fib [I48.91]  Atrial fibrillation with rapid ventricular response [I48.91]    Admission Date: 5/13/2022  Expected Discharge Date:     Discharge Barriers Identified: None    Payor: [employer] MANAGED MEDICARE / Plan: [employer] MEDICARE HMO / Product Type: Capitation /     Extended Emergency Contact Information  Primary Emergency Contact: Bri Montgomery  Address: [address] [place] [address] [facility]  Home Phone: [phone]  Mobile Phone: [phone]  Relation: Spouse    Discharge Plan A: Home Health  Discharge Plan B: Home with family      [facility] [id number]  [place] [address]  [address]  [place] [address]  Phone: [phone] Fax: [phone]    [facility] [address]  [address]   [place] [address]  Phone: [phone] Fax: [phone]      Initial Assessment (most recent)     Adult Discharge Assessment - 05/14/22 1556        Discharge Assessment    Assessment Type Discharge Planning Assessment     Confirmed/corrected address, phone number and insurance Yes     Confirmed Demographics Correct on Facesheet     Source of Information family     If unable to respond/provide information was family/caregiver contacted? Yes     Contact Name/Number Alexander Montgomery [phone]     When was your last doctors appointment? --   "About 3 mo ago"    Communicated KIARA with patient/caregiver Date not available/Unable to determine     Reason For Admission Paroxysmal atrial fibrillation     Lives With spouse     Facility Arrived From: Home     Do you expect to return to your current living situation? Yes     Do you have help at home or someone to help you manage your care at home? Yes     Who are your caregiver(s) and their phone number(s)? Wife Bri Montgomery [phone]     Prior " "to hospitilization cognitive status: Alert/Oriented     Current cognitive status: Unable to Assess     Walking or Climbing Stairs Difficulty none     Dressing/Bathing Difficulty none     Home Accessibility wheelchair accessible     Home Layout Able to live on 1st floor     Equipment Currently Used at Home none     Readmission within 30 days? No     Patient currently being followed by outpatient case management? No     Do you currently have service(s) that help you manage your care at home? No     Do you take prescription medications? Yes     Do you have prescription coverage? Yes     Coverage [employer]     Do you have any problems affording any of your prescribed medications? No     Is the patient taking medications as prescribed? no     Who is going to help you get home at discharge? Wife Bri Montgomery [phone]     How do you get to doctors appointments? family or friend will provide     Are you on dialysis? No     Do you take coumadin? No     Discharge Plan A Home Health     Discharge Plan B Home with family     DME Needed Upon Discharge  --   TBD    Discharge Plan discussed with: Spouse/sig other     Name(s) and Number(s) Wife Bri Montgomery [phone]     Discharge Barriers Identified None        Relationship/Environment    Name(s) of Who Lives With Patient Wife Bri Montgomery [phone]               Pt lives with his wife in a one story home with no steps to enter. Pt was independent prior to hospitalization, uses no DME, is not on dialysis or Coumadin. Pt has had home health in the past, but it was "years ago" and Pt's wife cannot recall the name of the agency.    Pt is reportedly non-compliant with medications, as he sometimes forgets to take them.     SW will follow for any discharge needs.     SHANDA King, LMSW [facility]  [id number]               "

## 2022-05-14 NOTE — ASSESSMENT & PLAN NOTE
Hx PAF on eliquis. Currently still with A-fib on IV amiodarone. Borderline low BP. Will discuss MICHELLE/CV if A-fib persists - needs MICHELLE due to interruption of eliquis for recent neck surgery. Check echo    5/14/22 Appears back in NSR - will check EKG to confirm. Change amiodarone to po. Ok for telemetry. Echo stable. Will f/u prn

## 2022-05-14 NOTE — ASSESSMENT & PLAN NOTE
Body mass index is 45.17 kg/m². Morbid obesity complicates all aspects of disease management from diagnostic modalities to treatment. Weight loss encouraged and health benefits explained to patient.  -patient reports that he has explored bariatric surgery in the past but was deemed not to be a good candidate given cardiovascular risk factors and age  -consider initiation of semaglutide at discharge for assistance with weight loss

## 2022-05-14 NOTE — ASSESSMENT & PLAN NOTE
Patient with Paroxysmal (<7 days) atrial fibrillation which is uncontrolled currently with Beta Blocker and Amiodarone. Patient is currently in atrial fibrillation.[id number] Score: 3. Anticoagulation indicated. Anticoagulation done with eliquis.  Will titrate metoprolol as able given BP.  Appreciate cardiology recs.  Started on amiodarone drip, rate improved.  Amiodarone switched to p.o..  Resumed metoprolol succinate at higher rate.

## 2022-05-14 NOTE — ASSESSMENT & PLAN NOTE
Continue home Plavix, atorvastatin.  Resumed Toprol-XL.  Dose increased  Started losartan in place of home olmesartan which is non formulary

## 2022-05-14 NOTE — SUBJECTIVE & OBJECTIVE
Interval History:     Review of Systems   Unable to perform ROS: Acuity of condition   Objective:     Vital Signs (Most Recent):  Temp: 98.5 °F (36.9 °C) (05/14/22 0315)  Pulse: 86 (05/14/22 0740)  Resp: 16 (05/14/22 0740)  BP: 139/62 (05/14/22 0600)  SpO2: 97 % (05/14/22 0740) Vital Signs (24h Range):  Temp:  [97.8 °F (36.6 °C)-98.5 °F (36.9 °C)] 98.5 °F (36.9 °C)  Pulse:  [] 86  Resp:  [12-32] 16  SpO2:  [94 %-100 %] 97 %  BP: ()/(44-82) 139/62     Weight: (!) 155.3 kg (342 lb 6 oz)  Body mass index is 45.17 kg/m².     SpO2: 97 %  O2 Device (Oxygen Therapy): CPAP      Intake/Output Summary (Last 24 hours) at 5/14/2022 0842  Last data filed at 5/13/2022 2100  Gross per 24 hour   Intake 2136.2 ml   Output 1051 ml   Net 1085.2 ml       Lines/Drains/Airways       Peripheral Intravenous Line  Duration                  Peripheral IV - Single Lumen 05/13/22 0729 20 G Right Antecubital 1 day         Peripheral IV - Single Lumen 05/13/22 0744 20 G Left Antecubital 1 day                    Physical Exam  Constitutional:       Appearance: He is well-developed.   HENT:      Head: Normocephalic and atraumatic.   Eyes:      Conjunctiva/sclera: Conjunctivae normal.      Pupils: Pupils are equal, round, and reactive to light.   Cardiovascular:      Rate and Rhythm: Normal rate.      Pulses: Intact distal pulses.      Heart sounds: Normal heart sounds.   Pulmonary:      Effort: Pulmonary effort is normal.      Breath sounds: Normal breath sounds.   Abdominal:      General: Bowel sounds are normal.      Palpations: Abdomen is soft.   Musculoskeletal:         General: Normal range of motion.      Cervical back: Normal range of motion and neck supple.      Right lower leg: Edema present.      Left lower leg: Edema present.   Skin:     General: Skin is warm and dry.   Neurological:      Mental Status: He is alert and oriented to person, place, and time.       Significant Labs: All pertinent lab results from the last 24  hours have been reviewed.    Significant Imaging: Echocardiogram: 2D echo with color flow doppler: No results found. However, due to the size of the patient record, not all encounters were searched. Please check Results Review for a complete set of results.

## 2022-05-14 NOTE — EICU
Rounding (Video Assessment):  No    Intervention Initiated From:  Bedside    Syed Communicated with Bedside Nurse regarding:  Medication and Respiratory    Nurse Notified:  No    Doctor Notified:  Yes    Comments: bedside nurse called to get resp treatments ordered for pt.  He did have them ordered previously but must of dropped off MAR.  Informed Dr. Robbins.

## 2022-05-14 NOTE — ASSESSMENT & PLAN NOTE
-status post radiation by pellet placement in 2018  -resumed home Flomax.  Can likely resume Ditropan as well now that normotensive/hypertensive  -monitor urine output and symptoms of retention.  P.rjonathon hernandez cath

## 2022-05-14 NOTE — HOSPITAL COURSE
5/14/22 Appears back in NSR 70s. Breathing stable on nasal CPAP    Echo 5/13/22  The left ventricle is normal in size with concentric hypertrophy and normal systolic function.  The estimated ejection fraction is 65%.  Normal left ventricular diastolic function.  Normal right ventricular size with normal right ventricular systolic function.  Mild left atrial enlargement.  Mild-to-moderate aortic regurgitation.  Mild tricuspid regurgitation.  Normal central venous pressure (3 mmHg).  The estimated PA systolic pressure is 16 mmHg.  Very TDS

## 2022-05-14 NOTE — PROGRESS NOTES
[facility] Intensive Care  Cardiology  Progress Note    Patient Name: Janusz Montgomery Jr.  MRN: [id number]  Admission Date: 5/13/2022  Hospital Length of Stay: 1 days  Code Status: Full Code   Attending Physician: Bhaskar Soliz MD   Primary Care Physician: Miguelina Weathers MD  Expected Discharge Date:   Principal Problem:Paroxysmal atrial fibrillation    Subjective:     Hospital Course:   5/14/22 Appears back in NSR 70s. Breathing stable on nasal CPAP    Echo 5/13/22  · The left ventricle is normal in size with concentric hypertrophy and normal systolic function.  · The estimated ejection fraction is 65%.  · Normal left ventricular diastolic function.  · Normal right ventricular size with normal right ventricular systolic function.  · Mild left atrial enlargement.  · Mild-to-moderate aortic regurgitation.  · Mild tricuspid regurgitation.  · Normal central venous pressure (3 mmHg).  · The estimated PA systolic pressure is 16 mmHg.  · Very TDS        Interval History:     Review of Systems   Unable to perform ROS: Acuity of condition   Objective:     Vital Signs (Most Recent):  Temp: 98.5 °F (36.9 °C) (05/14/22 0315)  Pulse: 86 (05/14/22 0740)  Resp: 16 (05/14/22 0740)  BP: 139/62 (05/14/22 0600)  SpO2: 97 % (05/14/22 0740) Vital Signs (24h Range):  Temp:  [97.8 °F (36.6 °C)-98.5 °F (36.9 °C)] 98.5 °F (36.9 °C)  Pulse:  [] 86  Resp:  [12-32] 16  SpO2:  [94 %-100 %] 97 %  BP: ()/(44-82) 139/62     Weight: (!) 155.3 kg (342 lb 6 oz)  Body mass index is 45.17 kg/m².     SpO2: 97 %  O2 Device (Oxygen Therapy): CPAP      Intake/Output Summary (Last 24 hours) at 5/14/2022 0842  Last data filed at 5/13/2022 2100  Gross per 24 hour   Intake 2136.2 ml   Output 1051 ml   Net 1085.2 ml       Lines/Drains/Airways       Peripheral Intravenous Line  Duration                  Peripheral IV - Single Lumen 05/13/22 0729 20 G Right Antecubital 1 day         Peripheral IV - Single Lumen 05/13/22 0744 20 G Left Antecubital  1 day                    Physical Exam  Constitutional:       Appearance: He is well-developed.   HENT:      Head: Normocephalic and atraumatic.   Eyes:      Conjunctiva/sclera: Conjunctivae normal.      Pupils: Pupils are equal, round, and reactive to light.   Cardiovascular:      Rate and Rhythm: Normal rate.      Pulses: Intact distal pulses.      Heart sounds: Normal heart sounds.   Pulmonary:      Effort: Pulmonary effort is normal.      Breath sounds: Normal breath sounds.   Abdominal:      General: Bowel sounds are normal.      Palpations: Abdomen is soft.   Musculoskeletal:         General: Normal range of motion.      Cervical back: Normal range of motion and neck supple.      Right lower leg: Edema present.      Left lower leg: Edema present.   Skin:     General: Skin is warm and dry.   Neurological:      Mental Status: He is alert and oriented to person, place, and time.       Significant Labs: All pertinent lab results from the last 24 hours have been reviewed.    Significant Imaging: Echocardiogram: 2D echo with color flow doppler: No results found. However, due to the size of the patient record, not all encounters were searched. Please check Results Review for a complete set of results.    Assessment and Plan:     Brief HPI:     * Paroxysmal atrial fibrillation   Hx PAF on eliquis. Currently still with A-fib on IV amiodarone. Borderline low BP. Will discuss MICHELLE/CV if A-fib persists - needs MICHELLE due to interruption of eliquis for recent neck surgery. Check echo    5/14/22 Appears back in NSR - will check EKG to confirm. Change amiodarone to po. Ok for telemetry. Echo stable. Will f/u prn    Asbestosis  Per primary    CHARISMA on CPAP  Per primary    Coronary artery disease involving native coronary artery of native heart without angina pectoris s/p RCA stent  Denies CP. Continue plavix    Other hyperlipidemia  On statin        VTE Risk Mitigation (From admission, onward)         Ordered     apixaban tablet 5  mg  2 times daily         05/13/22 1353     IP VTE HIGH RISK PATIENT  Once         05/13/22 1353     Place sequential compression device  Until discontinued         05/13/22 1353                Benjamin Sandhu MD  Cardiology  [facility] - Intensive Care

## 2022-05-14 NOTE — PROGRESS NOTES
[facility] Medicine  Progress Note    Patient Name: Janusz Montgomery Jr.  MRN: [id number]  Patient Class: IP- Inpatient   Admission Date: 5/13/2022  Length of Stay: 1 days  Attending Physician: Bhaskar Soliz MD  Primary Care Provider: Miguelina Weathers MD        Subjective:     Principal Problem:Paroxysmal atrial fibrillation        HPI:  Mr. Montgomery is a 77-year-old gentleman with morbid obesity (BMI 45), CAD status post 3 stents, hypertension, PAD, paroxysmal atrial fibrillation on Eliquis, CHARISMA on CPAP q.h.s. (14 cm H2O), asbestosis, CKD 3, history of prostate cancer status post resection, depression, head and neck tumor status post radical neck dissection 1.5 weeks ago.  He presented to our emergency department for elevated heart rate and progressive dyspnea with exertion.  Patient was discharged after neck dissection 1.5 weeks ago and since then has experienced increased dyspnea.  Patient took his SpO2 via pulse oximeter today and found that it was normal but found that his heart rate was elevated to the 170s.  He was directed by an on-call nurse to present to our emergency department.  Reports associated fatigue, orthopnea, cough.  Patient used albuterol multiple times this morning.  Denies PND, lower extremity edema, fever, chills.  Patient has been on a beta-blocker and Eliquis for many years related to the paroxysmal atrial fibrillation.  Though has not been in documented atrial fibrillation for years.  He also notes some redness at his incision site on his right upper chest adjacent to neck.    In the emergency department he was tachycardic to the 230s and hypotensive as low as 70/35.  Labs revealed stable macrocytic anemia.  Leukocyte count below recent admission.  Creatinine elevated to 1.8, slightly above baseline.  Troponin elevated to 0.046.  BNP within normal limits.  Lactate not elevated.  Chest x-ray without acute changes.  Patient started on amiodarone drip, received 500  cc LR bolus as well as 1.5 L NS bolus, given metoprolol IV 2.5 mg and Toprol XL 25 mg p.o. Admitted to hospital medicine service in the ICU for further management.      Overview/Hospital Course:  Patient admitted for atrial fibrillation with rapid ventricular response.  Evaluated by Cardiology.  Started on amiodarone drip.  Spontaneously converted to sinus rhythm at around 6:00 p.m. on 05/13.  Amiodarone switched to p.o..  Patient developed hypoxic respiratory failure, requiring low-flow supplemental O2 via nasal cannula to maintain normal SpO2.  Patient has erythema at recent neck dissection wound site consistent with cellulitis.  Started on doxycycline.  ENT is consulted.      Interval History:  Complaining of worse dyspnea this morning.  Denies palpitations.    Review of Systems   Constitutional:  Positive for activity change. Negative for appetite change, chills, fatigue and fever.   HENT:  Negative for congestion, rhinorrhea, sinus pressure, sinus pain and sore throat.    Eyes:  Negative for photophobia, pain and visual disturbance.   Respiratory:  Positive for shortness of breath. Negative for cough, chest tightness and wheezing.    Cardiovascular:  Negative for chest pain, palpitations and leg swelling.   Gastrointestinal:  Negative for abdominal distention, abdominal pain, constipation, diarrhea, nausea and vomiting.   Genitourinary:  Negative for dysuria, hematuria and urgency.   Musculoskeletal:  Negative for arthralgias, joint swelling and myalgias.   Skin:  Positive for color change and wound. Negative for pallor.   Neurological:  Negative for dizziness, weakness, numbness and headaches.   Hematological:  Does not bruise/bleed easily.   Psychiatric/Behavioral:  Negative for behavioral problems, confusion and hallucinations.    Objective:     Vital Signs (Most Recent):  Temp: (P) 97.7 °F (36.5 °C) (05/14/22 0800)  Pulse: 76 (05/14/22 0900)  Resp: (!) 24 (05/14/22 0900)  BP: (!) 179/74 (05/14/22  0911)  SpO2: 99 % (05/14/22 0900)   Vital Signs (24h Range):  Temp:  [97.7 °F (36.5 °C)-98.5 °F (36.9 °C)] (P) 97.7 °F (36.5 °C)  Pulse:  [] 76  Resp:  [11-34] 24  SpO2:  [88 %-100 %] 99 %  BP: ()/(44-83) 179/74     Weight: (!) 155.3 kg (342 lb 6 oz)  Body mass index is 45.17 kg/m².    Intake/Output Summary (Last 24 hours) at 5/14/2022 1026  Last data filed at 5/14/2022 0900  Gross per 24 hour   Intake 756.2 ml   Output 1626 ml   Net -869.8 ml      Physical Exam  Vitals and nursing note reviewed.   Constitutional:       General: He is not in acute distress.     Appearance: He is well-developed. He is obese.   HENT:      Head: Normocephalic and atraumatic.      Right Ear: External ear normal.      Left Ear: External ear normal.      Nose: Nose normal.   Eyes:      Conjunctiva/sclera: Conjunctivae normal.      Pupils: Pupils are equal, round, and reactive to light.   Cardiovascular:      Rate and Rhythm: Normal rate and regular rhythm.   Pulmonary:      Effort: Pulmonary effort is normal. No respiratory distress.      Breath sounds: Normal breath sounds. No wheezing or rales.      Comments: Limited lung volumes  Abdominal:      General: Bowel sounds are normal. There is no distension.      Palpations: Abdomen is soft.      Tenderness: There is no abdominal tenderness.   Musculoskeletal:         General: No tenderness. Normal range of motion.      Cervical back: Normal range of motion and neck supple.      Right lower leg: No edema.      Left lower leg: No edema.   Skin:     General: Skin is warm and dry.      Comments: Erythema at right chest adjacent to right neck wound   Neurological:      Mental Status: He is alert and oriented to person, place, and time.   Psychiatric:         Thought Content: Thought content normal.       Significant Labs: All pertinent labs within the past 24 hours have been reviewed.    Significant Imaging: I have reviewed all pertinent imaging results/findings within the past 24  hours.      Assessment/Plan:      * Paroxysmal atrial fibrillation   Patient with Paroxysmal (<7 days) atrial fibrillation which is uncontrolled currently with Beta Blocker and Amiodarone. Patient is currently in atrial fibrillation.[id number] Score: 3. Anticoagulation indicated. Anticoagulation done with eliquis.  Will titrate metoprolol as able given BP.  Appreciate cardiology recs.  Started on amiodarone drip, rate improved.  Amiodarone switched to p.o..  Resumed metoprolol succinate at higher rate.      Acute hypoxemic respiratory failure  Patient with Hypoxic Respiratory failure which is Acute.  he is not on home oxygen. Supplemental oxygen was provided and noted- Oxygen Concentration (%):  [36-40] 40.   Signs/symptoms of respiratory failure include- tachypnea, increased work of breathing and respiratory distress. Contributing diagnoses includes - CHF and Obesity Hypoventilation Labs and images were reviewed. Patient Has not had a recent ABG. Will treat underlying causes and adjust management of respiratory failure as follows-   -patient with restrictive lung disease due to obesity and pleural changes related to asbestos exposure  -currently requiring supplemental O2 to maintain normal SpO2.  Had desaturation episode to mid 80s while at rest  -will need walk test for evaluation for home O2    Cellulitis  -some erythema and minimal drainage at wound site of recent neck dissection  -started doxycycline.  Would plan for 10-14 day course  -ENT consulted to evaluate      Prostate cancer  -status post radiation by pellet placement in 2018  -resumed home Flomax.  Can likely resume Ditropan as well now that normotensive/hypertensive  -monitor urine output and symptoms of retention.  P.r.n. straight cath      Asbestosis  Pleural plaques noted on chest x-ray, unchanged      CKD (chronic kidney disease) stage 3, GFR 30-59 ml/min  -does not appear to be in acute renal failure.  Creatinine clearance sufficient at present so  as unlikely to interfere with medication dosing.  Will continue to monitor      Morbid obesity  Body mass index is 45.17 kg/m². Morbid obesity complicates all aspects of disease management from diagnostic modalities to treatment. Weight loss encouraged and health benefits explained to patient.  -patient reports that he has explored bariatric surgery in the past but was deemed not to be a good candidate given cardiovascular risk factors and age  -consider initiation of semaglutide at discharge for assistance with weight loss         CHARISMA on CPAP  Using home machine.  Apparently uses APAP with pressures of 14-16.      Coronary artery disease involving native coronary artery of native heart without angina pectoris s/p RCA stent  Continue home Plavix, atorvastatin.  Resumed Toprol-XL.  Dose increased  Started losartan in place of home olmesartan which is non formulary      Other hyperlipidemia  Continue home statin        VTE Risk Mitigation (From admission, onward)         Ordered     apixaban tablet 5 mg  2 times daily         05/13/22 1353     IP VTE HIGH RISK PATIENT  Once         05/13/22 1353     Place sequential compression device  Until discontinued         05/13/22 1353                Discharge Planning   KIARA:      Code Status: Full Code   Is the patient medically ready for discharge?:     Reason for patient still in hospital (select all that apply): Patient trending condition, Treatment and PT / OT recommendations               Critical care time spent on the evaluation and treatment of severe organ dysfunction, review of pertinent labs and imaging studies, discussions with consulting providers and discussions with patient/family: 40 minutes.      Bhaskar Soliz MD  Department of Hospital Medicine   [facility] [place] - Intensive Care

## 2022-05-15 NOTE — PROGRESS NOTES
[facility] Medicine  Progress Note    Patient Name: Janusz oMntgomery Jr.  MRN: [id number]  Patient Class: IP- Inpatient   Admission Date: 5/13/2022  Length of Stay: 2 days  Attending Physician: Romero Martin MD  Primary Care Provider: Miguelina Weathers MD        Subjective:     Principal Problem:Paroxysmal atrial fibrillation        HPI:  Mr. Montgomery is a 77-year-old gentleman with morbid obesity (BMI 45), CAD status post 3 stents, hypertension, PAD, paroxysmal atrial fibrillation on Eliquis, CHARISMA on CPAP q.h.s. (14 cm H2O), asbestosis, CKD 3, history of prostate cancer status post resection, depression, head and neck tumor status post radical neck dissection 1.5 weeks ago.  He presented to our emergency department for elevated heart rate and progressive dyspnea with exertion.  Patient was discharged after neck dissection 1.5 weeks ago and since then has experienced increased dyspnea.  Patient took his SpO2 via pulse oximeter today and found that it was normal but found that his heart rate was elevated to the 170s.  He was directed by an on-call nurse to present to our emergency department.  Reports associated fatigue, orthopnea, cough.  Patient used albuterol multiple times this morning.  Denies PND, lower extremity edema, fever, chills.  Patient has been on a beta-blocker and Eliquis for many years related to the paroxysmal atrial fibrillation.  Though has not been in documented atrial fibrillation for years.  He also notes some redness at his incision site on his right upper chest adjacent to neck.    In the emergency department he was tachycardic to the 230s and hypotensive as low as 70/35.  Labs revealed stable macrocytic anemia.  Leukocyte count below recent admission.  Creatinine elevated to 1.8, slightly above baseline.  Troponin elevated to 0.046.  BNP within normal limits.  Lactate not elevated.  Chest x-ray without acute changes.  Patient started on amiodarone drip, received 500 cc LR  bolus as well as 1.5 L NS bolus, given metoprolol IV 2.5 mg and Toprol XL 25 mg p.o. Admitted to hospital medicine service in the ICU for further management.      Overview/Hospital Course:  Patient admitted for atrial fibrillation with rapid ventricular response.  Evaluated by Cardiology.  Started on amiodarone drip.  Spontaneously converted to sinus rhythm at around 6:00 p.m. on 05/13.  Amiodarone switched to p.o..  Patient developed hypoxic respiratory failure, requiring low-flow supplemental O2 via nasal cannula to maintain normal SpO2.  Patient has erythema at recent neck dissection wound site consistent with cellulitis.  Started on doxycycline.  ENT is consulted.      Interval History:  Still having some shortness of breath, had long discussion with patient and daughter about his chronic issues.     Review of Systems   Constitutional:  Positive for activity change. Negative for appetite change, chills, fatigue and fever.   HENT:  Negative for congestion, rhinorrhea, sinus pressure, sinus pain and sore throat.    Eyes:  Negative for photophobia, pain and visual disturbance.   Respiratory:  Positive for shortness of breath. Negative for cough, chest tightness and wheezing.    Cardiovascular:  Negative for chest pain, palpitations and leg swelling.   Gastrointestinal:  Negative for abdominal distention, abdominal pain, constipation, diarrhea, nausea and vomiting.   Genitourinary:  Negative for dysuria, hematuria and urgency.   Musculoskeletal:  Negative for arthralgias, joint swelling and myalgias.   Skin:  Positive for color change and wound. Negative for pallor.   Neurological:  Negative for dizziness, weakness, numbness and headaches.   Hematological:  Does not bruise/bleed easily.   Psychiatric/Behavioral:  Negative for behavioral problems, confusion and hallucinations.    Objective:     Vital Signs (Most Recent):  Temp: 97.7 °F (36.5 °C) (05/15/22 1132)  Pulse: 63 (05/15/22 1132)  Resp: 16 (05/15/22  1132)  BP: 139/64 (05/15/22 1132)  SpO2: 97 % (05/15/22 1132)   Vital Signs (24h Range):  Temp:  [97.7 °F (36.5 °C)-98.6 °F (37 °C)] 97.7 °F (36.5 °C)  Pulse:  [63-78] 63  Resp:  [10-34] 16  SpO2:  [91 %-100 %] 97 %  BP: (104-181)/() 139/64     Weight: (!) 155.3 kg (342 lb 6 oz)  Body mass index is 45.17 kg/m².    Intake/Output Summary (Last 24 hours) at 5/15/2022 1218  Last data filed at 5/15/2022 0900  Gross per 24 hour   Intake 382.44 ml   Output 2775 ml   Net -2392.56 ml        Physical Exam  Vitals and nursing note reviewed.   Constitutional:       General: He is not in acute distress.     Appearance: He is well-developed. He is obese.   HENT:      Head: Normocephalic and atraumatic.      Right Ear: External ear normal.      Left Ear: External ear normal.      Nose: Nose normal.   Eyes:      Conjunctiva/sclera: Conjunctivae normal.      Pupils: Pupils are equal, round, and reactive to light.   Neck:      Comments: Swelling to right neck  Cardiovascular:      Rate and Rhythm: Normal rate and regular rhythm.   Pulmonary:      Effort: Pulmonary effort is normal. No respiratory distress.      Breath sounds: Normal breath sounds. No wheezing.      Comments: Limited lung volumes  Mild crackles at left base  Abdominal:      General: Bowel sounds are normal. There is no distension.      Palpations: Abdomen is soft.      Tenderness: There is no abdominal tenderness.   Musculoskeletal:         General: No tenderness. Normal range of motion.      Cervical back: Normal range of motion and neck supple.      Right lower leg: No edema.      Left lower leg: No edema.   Skin:     General: Skin is warm and dry.      Comments: Erythema at right chest adjacent to right neck wound   Neurological:      Mental Status: He is alert and oriented to person, place, and time.   Psychiatric:         Thought Content: Thought content normal.       Significant Labs: All pertinent labs within the past 24 hours have been  reviewed.    Significant Imaging: I have reviewed all pertinent imaging results/findings within the past 24 hours.      Assessment/Plan:      * Paroxysmal atrial fibrillation   Patient with Paroxysmal (<7 days) atrial fibrillation which is uncontrolled currently with Beta Blocker and Amiodarone. Patient is currently in atrial fibrillation.[id number] Score: 3. Anticoagulation indicated. Anticoagulation done with eliquis.  Will titrate metoprolol as able given BP.  Appreciate cardiology recs.  Started on amiodarone drip, rate improved.  Amiodarone switched to p.o..  Resumed metoprolol succinate at higher rate.      Acute hypoxemic respiratory failure  Patient with Hypoxic Respiratory failure which is Acute.  he is not on home oxygen. Supplemental oxygen was provided and noted-  .   Signs/symptoms of respiratory failure include- tachypnea, increased work of breathing and respiratory distress. Contributing diagnoses includes - CHF and Obesity Hypoventilation Labs and images were reviewed. Patient Has not had a recent ABG. Will treat underlying causes and adjust management of respiratory failure as follows-   -patient with restrictive lung disease due to obesity and pleural changes related to asbestos exposure  -currently requiring supplemental O2 to maintain normal SpO2.  Had desaturation episode to mid 80s while at rest  -will need walk test for evaluation for home O2  - multifactorial, and chronic - working towards home    Asbestosis  Pleural plaques noted on chest x-ray, unchanged      CKD (chronic kidney disease) stage 3, GFR 30-59 ml/min  -does not appear to be in acute renal failure.  Creatinine clearance sufficient at present so as unlikely to interfere with medication dosing.  Will continue to monitor  - stable    Cellulitis  -some erythema and minimal drainage at wound site of recent neck dissection  -started doxycycline.  Would plan for 10-14 day course  -ENT consulted to evaluate  - appears  stable      Prostate cancer  -status post radiation by pellet placement in 2018  -resumed home Flomax.  Can likely resume Ditropan as well now that normotensive/hypertensive  -monitor urine output and symptoms of retention.  P.r.n. straight cath      Morbid obesity  Body mass index is 45.17 kg/m². Morbid obesity complicates all aspects of disease management from diagnostic modalities to treatment. Weight loss encouraged and health benefits explained to patient.  -patient reports that he has explored bariatric surgery in the past but was deemed not to be a good candidate given cardiovascular risk factors and age  -consider of semaglutide at discharge for assistance with weight loss -  Patient and family plan to discuss with PCP        CHARISMA on CPAP  Using home machine.  Apparently uses APAP with pressures of 14-16.  - compliant      Coronary artery disease involving native coronary artery of native heart without angina pectoris s/p RCA stent  Continue home Plavix, atorvastatin.  Resumed Toprol-XL.  Dose increased  Started losartan in place of home olmesartan which is non formulary      Other hyperlipidemia  Continue home statin        VTE Risk Mitigation (From admission, onward)         Ordered     apixaban tablet 5 mg  2 times daily         05/13/22 1353     IP VTE HIGH RISK PATIENT  Once         05/13/22 1353     Place sequential compression device  Until discontinued         05/13/22 1353                Discharge Planning   KIARA:      Code Status: Full Code   Is the patient medically ready for discharge?:     Reason for patient still in hospital (select all that apply): Treatment  Discharge Plan A: Home Health                  Romero Martin MD  Department of Hospital Medicine   [facility] [place] [facility]

## 2022-05-15 NOTE — PLAN OF CARE
Problem: Occupational Therapy  Goal: Occupational Therapy Goal  Outcome: Adequate for Care Transition     Patient evaluated today by OT.  Patient requires no further acute care OT services.  D/C to home with outpatient rehab.  DME: No needs.  Patient is able to ambulate to and from bed/chair/bathroom with assistance from nursing staff and use of O2.  OT orders discontinued.

## 2022-05-15 NOTE — PT/OT/SLP EVAL
"Physical Therapy Evaluation and Discharge Note    Patient Name:  Janusz Montgomery Jr.   MRN:  [id number]    Recommendations:     Discharge Recommendations:   (Outpatient cardiopulmonary rehab)   Discharge Equipment Recommendations: none   Barriers to discharge: None    Assessment:     Janusz Montgomery Jr. is a 77 y.o. male admitted with a medical diagnosis of Paroxysmal atrial fibrillation. .  At this time, patient is functioning at their prior level of function and does not require further acute PT services.     Recent Surgery: * No surgery found *      Plan:     During this hospitalization, patient does not require further acute PT services.  Please re-consult if situation changes.      Subjective     Chief Complaint: SOB, doesn't feel as if he can inhale enough  Patient/Family Comments/goals: to  Not feel SOB  Pain/Comfort:  · Pain Rating 1: 0/10    Patients cultural, spiritual, Islam conflicts given the current situation: no    Living Environment:  Pt lives with his spouse in a [facility] with no concerns.  Prior to admission, patients level of function was Modified independent with RW for ambulation.  Per PT note dated 5/10/22 Pt ambulated 20' with RW and SBA, but required 4 standing rest breaks 2/2 SOB and Decreased SPO2.  Equipment used at home: bath bench, walker, rolling, bedside commode ("HurryCane")CPAP.  DME owned (not currently used): none.  Upon discharge, patient will have assistance from [employer].    Objective:     Communicated with nsg prior to session.  Patient found up in chair with oxygen, telemetry upon PT entry to room.    General Precautions: Standard, fall   Orthopedic Precautions:N/A   Braces: N/A   Respiratory Status: Nasal cannula, flow 3 L/min    Exams:  · Cognitive Exam:  Patient is oriented to Person, Place, Time and Situation  · Gross Motor Coordination:  impaired 2/2 gen weakness, deconditioning, and body habitus  · Postural Exam:  Patient presented with the following abnormalities:    · -     "   Rounded shoulders  · -       Forward head  · Skin Integrity/Edema:      · -       Skin integrity: Visible skin intact  · RLE ROM: WFL  · RLE Strength: WFL  · LLE ROM: WFL  · LLE Strength: WFL    Functional Mobility:  · Transfers:     · Sit to Stand:  stand by assistance with no AD  · Gait: Pt ambulated with RW and Supervision approx 2x150' with standing rest break in between  · Balance: Good-/Fair+ sit and stand    AM-PAC 6 CLICK MOBILITY  Total Score:18       Therapeutic Activities and Exercises:   Handout provided to pt and Educated pt on pursed lip breathing throughout treatment session 2/2 SOB.  Pt able to return demonstration with VC.  SPO2 97% on 3L O2 at rest and 91% on 3L O2 with ambulation    AM-PAC 6 CLICK MOBILITY  Total Score:18     Patient left up in chair with all lines intact, call button in reach and nsg notified.    GOALS:   Multidisciplinary Problems     Physical Therapy Goals     Not on file          Multidisciplinary Problems (Resolved)        Problem: Physical Therapy    Goal Priority Disciplines Outcome Goal Variances Interventions   Physical Therapy Goal   (Resolved)     PT, PT/OT Met                     History:     Past Medical History:   Diagnosis Date    (HFpEF) heart failure with preserved ejection fraction 2/4/2022    NAT (acute kidney injury) 3/19/2017    ALLERGIC RHINITIS     Anemia     Anxiety     Basal cell carcinoma 10/19/2018    forehead and right medial shoulder    Basal cell carcinoma 01/09/2019    left nasal bridge and left posterior ear    Basal cell carcinoma 06/12/2020    left lower medial leg - Efudex    Basal cell carcinoma 11/30/2020    left medial upper eyelid canthus superficial BCC    Chronic rhinitis 5/3/2013    Chronic rhinitis 5/3/2013    Coronary artery disease involving native coronary artery of native heart without angina pectoris s/p RCA stent     Cortical cataract of both eyes 3/18/2016    Delayed sleep phase syndrome 3/13/2019    Depression   "   Erectile dysfunction 3/24/2014    Erectile dysfunction 3/24/2014    Essential hypertension     GERD (gastroesophageal reflux disease) 7/25/2012    Gout, arthritis     Grade III open fracture of left tibia and fibula s/p ex-fix on 7/1/16 and ORIF of left tibia on 7/15 7/6/2016    H/O: iritis     Helicobacter pylori (H. pylori) infection     Treated    Hepatitis     Herpes simplex keratoconjunctivitis 9/30/2015    - on acyclovir - followed by opthalmology, Dr. Uribe     Herpes simplex keratoconjunctivitis 9/30/2015    - on acyclovir - followed by opthalmology, Dr. Uribe     Hyperkalemia 2/28/2017    Hyperlipidemia     Hypogonadism male     Hypogonadism male     Keloid cicatrix     Mixed anxiety and depressive disorder     Morbid obesity     Obstructive sleep apnea on CPAP     CPAP    Osteoarthritis of left knee 7/25/2012    Paroxysmal atrial fibrillation 7/6/2016    Primary osteoarthritis of left knee 7/25/2012    Prominent aorta 1/25/2016    "RESULTS: THE HEART IS MILDLY ENLARGED WITH A SLIGHTLY PROMINENT AORTA" - Xray Chest PA & Lateral 12-     Prostate cancer 2/15/2016    - followed by urology, Dr. Young     Prostate cancer 2/15/2016    - followed by urology, Dr. 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    chest and forehead    Squamous cell cancer of skin of nose     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 7/6/2016    Type III open fracture of left tibia and fibula with routine healing 7/6/2016    Vitamin D deficiency disease     Vitreous detachment 3/18/2016       Past Surgical History:   Procedure Laterality Date    ADJACENT TISSUE TRANSFER Left 11/30/2020    Procedure: ADJACENT TISSUE TRANSFER " "x2 ;  Surgeon: Teresa Cooper MD;  Location: [facility] 1ST FLR;  Service: Ophthalmology;  Laterality: Left;  Left glabellar 8.5x11x3 and Left upper eyelid advancement flap [id number]      Cardiac stenting x2      CATARACT EXTRACTION W/  INTRAOCULAR LENS IMPLANT Right 3/29/2016    Dr. Conteh    CATARACT EXTRACTION W/  INTRAOCULAR LENS IMPLANT Left 4/12/2016        COLONOSCOPY N/A 7/23/2020    Procedure: COLONOSCOPY;  Surgeon: Nico Lackey MD;  Location: [facility] (2ND FLR);  Service: Endoscopy;  Laterality: N/A;    DIRECT LARYNGOSCOPY N/A 5/4/2022    Procedure: LARYNGOSCOPY, DIRECT possible biopsy;  Surgeon: Etta Higgins MD;  Location: [facility] OR;  Service: ENT;  Laterality: N/A;  RN PREOP ON 4/29/22. NEEDS T&S DAY OF SURGERY--NF  CONSENTS DAY OF SURGERY    DISSECTION OF NECK Right 5/4/2022    Procedure: RADICAL NECK DISSECTION;  Surgeon: Etta Higgins MD;  Location: [facility] OR;  Service: ENT;  Laterality: Right;  TO FOLLOW DR ALMAZAN    ESOPHAGOGASTRODUODENOSCOPY N/A 7/23/2020    Procedure: EGD (ESOPHAGOGASTRODUODENOSCOPY);  Surgeon: Nico Lackey MD;  Location: [facility] (2ND FLR);  Service: Endoscopy;  Laterality: N/A;  per Dr Lackey-Will proceed with EGD and colonoscopy on the 2nd floor due to his comorbidities including obesity, sleep apnea, restrictive lung disease, coronary artery disease.  has loop recorder      ok to hold Eliquis 2 days per Dr Sandhu-must remain    EXTERNAL FIXATION TIBIAL FRACTURE Left 07/01/2016    INSERTION OF IMPLANTABLE LOOP RECORDER  04/07/2017    LEFT HEART CATHETERIZATION Left 1/30/2020    Procedure: Left heart cath;  Surgeon: Benjamin Sandhu MD;  Location: [facility] CATH LAB;  Service: Cardiology;  Laterality: Left;  RN PREOP 1/28/20--Pt starting Plavix loading dose today (8pills)- Dr. Sandhu aware.  Pt has a bandaged "non healing area to LLE"--Dr. Sandhu aware.    LEFT HEART CATHETERIZATION Left 2/14/2020    Procedure: Left heart cath, IVUS guided " left main / LAD PCI. Noon start, radial approach;  Surgeon: Miguel Angel Brady MD;  Location: [facility] CATH LAB;  Service: Cardiology;  Laterality: Left;  RN PRE OP 2-7-2020  BMI--45.61    ORIF TIBIA FRACTURE Left 07/15/2016    PROBING OF NASOLACRIMAL DUCT WITH INSERTION OF TUBE Left 11/30/2020    Procedure: PROBING, NASOLACRIMAL DUCT,;  Surgeon: Teresa Cooper MD;  Location: [facility] OR [address] [place] [address];  Service: Ophthalmology;  Laterality: Left;    RADIOACTIVE SEED IMPLANTATION OF PROSTATE N/A 8/8/2018    Procedure: INSERTION, RADIOACTIVE SEED, PROSTATE;  Surgeon: Bipin Thompson MD;  Location: [facility] OR [address] [place] [address];  Service: Urology;  Laterality: N/A;  1 hour    SKIN BIOPSY      Squamous cell cancer removal x3 with Mohs surgery      TONSILLECTOMY      TOTAL KNEE ARTHROPLASTY  10/2012    TRIGGER FINGER RELEASE Right 10/14/2020    Procedure: RELEASE, TRIGGER FINGER - right;  Surgeon: Rad Swift MD;  Location: [facility];  Service: Orthopedics;  Laterality: Right;    trus/bx      ULTRASOUND GUIDANCE  2/14/2020    Procedure: Ultrasound Guidance;  Surgeon: Miguel Angel Brady MD;  Location: [facility] CATH LAB;  Service: Cardiology;;       Time Tracking:     PT Received On: 05/15/22  PT Start Time: 1034     PT Stop Time: 1058  PT Total Time (min): 24 min     Billable Minutes: Evaluation 15 and Therapeutic Activity 9 co-evaluation with OT      05/15/2022

## 2022-05-15 NOTE — PT/OT/SLP EVAL
"Occupational Therapy   Evaluation and Discharge Note    Name: Janusz Montgomery Jr.  MRN: [id number]  Admitting Diagnosis:  Paroxysmal atrial fibrillation   Recent Surgery: * No surgery found *      Recommendations:     Discharge Recommendations: other (see comments) (Outpatient CP rehab)  Discharge Equipment Recommendations: none  Barriers to Discharge: None    Assessment:     Janusz Montgomery Jr. is a 77 y.o. male with a medical diagnosis of paroxysmal atrial fibrillation.  At this time, patient is functioning at his prior level of function and does not require further acute OT services.     Plan:     During this hospitalization, patient does not require further acute OT services.  Please re-consult if situation changes.    · Plan of Care Reviewed with: patient    Subjective     Chief Complaint: Patient reports that he feels as though he can't get enough air in his lungs.   Patient/Family Comments/Goals: To D/C to home at [facility] is his goal.     Occupational Profile:  Living Environment: Patient lives with his wife in a [facility].  There are no steps to enter.  The bathroom has a built-in bath bench.      Previous Level of Function: Patient was Mod I for T/F's and ADL's with use of a RW.  He reports that he had a BSC but did not use it.  Roles and Routines: Patient reports that he was independent, normally helped his wife with things as needed, and still drove.   Equipment Used at Home: Bath bench, rolling walker, bedside commode, and "Hurrycane"; patient also stated that he was normally on 4 lpm of O2 at home.   Assistance upon Discharge: Patient will have limited assistance from his spouse upon D/C.     Pain/Comfort:  · Pain Rating 1: 0/10  · Pain Rating Post-Intervention 1: 0/10    Patients cultural, spiritual, Congregation conflicts discussed given the current situation: No    Objective:     Communicated with: Nursing and the assigned PT prior to session.  Patient found upright in armchair with oxygen and telemetry upon OT " entry to room.    General Precautions: Standard, fall   Orthopedic Precautions: N/A   Braces: N/A  Respiratory Status: NC at 3 lpm      Occupational Performance:    Bed Mobility:    · N/A as the patient was not treated from bed today.     Functional Mobility/Transfers:  · Transfers: Patient was SBA to stand from armchair without use of RW; he was Supervision for functional T/F's with use of RW.    · Functional Mobility: Patient ambulated with RW and PT/OT with Supervision in room and hallway.     Activities of Daily Living:  · UE Dressing: Set-Up A to SBA for closing gown  · LE Dressing: Set-Up A for donning/doffing socks     Cognitive/Visual Perceptual:  Cognitive/Psychosocial Skills:     -       Oriented to: Person, Place, Time, and Situation   -       Follows Commands/Attention: Follows multi-step commands  -       Communication: Clear/Fluent  -       Memory: No deficits noted  -       Safety Awareness/Insight to Disability: Intact/mildly impaired; patient attempted to ambulate without RW and had to be reminded to use it by PT and OT.    -       Mood/Affect/Coping Skills/Emotional Control: Appropriate to Situation, Cooperative, and Pleasant  Visual/Perceptual:      -Intact    Physical Exam:  Balance: Intact for sitting balance; min deficits noted for standing balance; patient requires use of a RW.  Edema: Edema noted at LLE (distal aspect, near sock line)  Sensation:    -       Impaired: Patient reports some LE numbness.  Patient c/o sensation issues at bilateral hands, especially at Digit V of each hand.  Upper Extremity Range of Motion:     -       Right Upper Extremity: WNL  -       Left Upper Extremity: WNL  Upper Extremity Strength:    -       Right Upper Extremity: 4-/5  -       Left Upper Extremity: 4-/5   Strength:    -       Right Upper Extremity: Decreased  strength  -       Left Upper Extremity: Decreased  strength    AMPAC 6 Click ADL:  AMPAC Total Score: 22    Treatment &  Education:  Patient evaluated today by OT.  Patient requires no further acute care OT services.  Patient is able to ambulate to and from bed/chair/bathroom with assistance from nursing staff and use of O2.  OT orders discontinued.  Rec's as per above.    Education:    Patient left sitting upright in armchair with call button within reach, all lines intact, phone within reach, and nursing informed.     GOALS:   Multidisciplinary Problems     Occupational Therapy Goals        Problem: Occupational Therapy    Goal Priority Disciplines Outcome Interventions   Occupational Therapy Goal     OT, PT/OT Adequate for Care Transition                    History:     Past Medical History:   Diagnosis Date    (HFpEF) heart failure with preserved ejection fraction 2/4/2022    NAT (acute kidney injury) 3/19/2017    ALLERGIC RHINITIS     Anemia     Anxiety     Basal cell carcinoma 10/19/2018    forehead and right medial shoulder    Basal cell carcinoma 01/09/2019    left nasal bridge and left posterior ear    Basal cell carcinoma 06/12/2020    left lower medial leg - Efudex    Basal cell carcinoma 11/30/2020    left medial upper eyelid canthus superficial BCC    Chronic rhinitis 5/3/2013    Chronic rhinitis 5/3/2013    Coronary artery disease involving native coronary artery of native heart without angina pectoris s/p RCA stent     Cortical cataract of both eyes 3/18/2016    Delayed sleep phase syndrome 3/13/2019    Depression     Erectile dysfunction 3/24/2014    Erectile dysfunction 3/24/2014    Essential hypertension     GERD (gastroesophageal reflux disease) 7/25/2012    Gout, arthritis     Grade III open fracture of left tibia and fibula s/p ex-fix on 7/1/16 and ORIF of left tibia on 7/15 7/6/2016    H/O: iritis     Helicobacter pylori (H. pylori) infection     Treated    Hepatitis     Herpes simplex keratoconjunctivitis 9/30/2015    - on acyclovir - followed by opthalmology, Dr. Uribe     Herpes  "simplex keratoconjunctivitis 9/30/2015    - on acyclovir - followed by opthalmology, Dr. Uribe     Hyperkalemia 2/28/2017    Hyperlipidemia     Hypogonadism male     Hypogonadism male     Keloid cicatrix     Mixed anxiety and depressive disorder     Morbid obesity     Obstructive sleep apnea on CPAP     CPAP    Osteoarthritis of left knee 7/25/2012    Paroxysmal atrial fibrillation 7/6/2016    Primary osteoarthritis of left knee 7/25/2012    Prominent aorta 1/25/2016    "RESULTS: THE HEART IS MILDLY ENLARGED WITH A SLIGHTLY PROMINENT AORTA" - Xray Chest PA & Lateral 12-     Prostate cancer 2/15/2016    - followed by urology, Dr. Young     Prostate cancer 2/15/2016    - followed by urology, Dr. 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    chest and forehead    Squamous cell cancer of skin of nose     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 7/6/2016    Type III open fracture of left tibia and fibula with routine healing 7/6/2016    Vitamin D deficiency disease     Vitreous detachment 3/18/2016       Past Surgical History:   Procedure Laterality Date    ADJACENT TISSUE TRANSFER Left 11/30/2020    Procedure: ADJACENT TISSUE TRANSFER x2 ;  Surgeon: Teresa Cooper MD;  Location: [address];  Service: Ophthalmology;  Laterality: Left;  Left glabellar 8.5x11x3 and Left upper eyelid advancement flap [id number]      Cardiac stenting x2      CATARACT EXTRACTION W/  INTRAOCULAR LENS IMPLANT Right 3/29/2016    Dr. Conteh    CATARACT EXTRACTION W/  INTRAOCULAR LENS IMPLANT Left 4/12/2016        COLONOSCOPY N/A 7/23/2020    Procedure: COLONOSCOPY;  Surgeon: Nico Lackey MD;  Location: [facility] ([facility]" "FL[facility]);  Service: Endoscopy;  Laterality: N/A;    DIRECT LARYNGOSCOPY N/A 5/4/2022    Procedure: LARYNGOSCOPY, DIRECT possible biopsy;  Surgeon: Etta Higgins MD;  Location: [facility] OR;  Service: ENT;  Laterality: N/A;  RN PREOP ON 4/29/22. NEEDS T&S DAY OF SURGERY--NF  CONSENTS DAY OF SURGERY    DISSECTION OF NECK Right 5/4/2022    Procedure: RADICAL NECK DISSECTION;  Surgeon: Etta Higgins MD;  Location: [facility] OR;  Service: ENT;  Laterality: Right;  TO FOLLOW DR ALMAZAN    ESOPHAGOGASTRODUODENOSCOPY N/A 7/23/2020    Procedure: EGD (ESOPHAGOGASTRODUODENOSCOPY);  Surgeon: Nico Lackey MD;  Location: [facility] (2ND FLR);  Service: Endoscopy;  Laterality: N/A;  per Dr Lackey-Will proceed with EGD and colonoscopy on the 2nd floor due to his comorbidities including obesity, sleep apnea, restrictive lung disease, coronary artery disease.  has loop recorder      ok to hold Eliquis 2 days per Dr Sandhu-must remain    EXTERNAL FIXATION TIBIAL FRACTURE Left 07/01/2016    INSERTION OF IMPLANTABLE LOOP RECORDER  04/07/2017    LEFT HEART CATHETERIZATION Left 1/30/2020    Procedure: Left heart cath;  Surgeon: Benjamin Sandhu MD;  Location: [facility] CATH LAB;  Service: Cardiology;  Laterality: Left;  RN PREOP 1/28/20--Pt starting Plavix loading dose today (8pills)- Dr. Sandhu aware.  Pt has a bandaged "non healing area to LLE"--Dr. Sandhu aware.    LEFT HEART CATHETERIZATION Left 2/14/2020    Procedure: Left heart cath, IVUS guided left main / LAD PCI. Noon start, radial approach;  Surgeon: Miguel Angel Brady MD;  Location: [facility] CATH LAB;  Service: Cardiology;  Laterality: Left;  RN PRE OP 2-7-2020  BMI--45.61    ORIF TIBIA FRACTURE Left 07/15/2016    PROBING OF NASOLACRIMAL DUCT WITH INSERTION OF TUBE Left 11/30/2020    Procedure: PROBING, NASOLACRIMAL DUCT,;  Surgeon: Teresa Cooper MD;  Location: [facility] 1ST FLR;  Service: Ophthalmology;  Laterality: Left;    RADIOACTIVE SEED IMPLANTATION OF PROSTATE N/A 8/8/2018    " Procedure: INSERTION, RADIOACTIVE SEED, PROSTATE;  Surgeon: Bipin Thompson MD;  Location: [address];  Service: Urology;  Laterality: N/A;  1 hour    SKIN BIOPSY      Squamous cell cancer removal x3 with Mohs surgery      TONSILLECTOMY      TOTAL KNEE ARTHROPLASTY  10/2012    TRIGGER FINGER RELEASE Right 10/14/2020    Procedure: RELEASE, TRIGGER FINGER - right;  Surgeon: Rad Swift MD;  Location: [facility] OR;  Service: Orthopedics;  Laterality: Right;    trus/bx      ULTRASOUND GUIDANCE  2/14/2020    Procedure: Ultrasound Guidance;  Surgeon: Miguel Angel Brady MD;  Location: [facility] CATH LAB;  Service: Cardiology;;       Time Tracking:     OT Date of Treatment: 05/15/22  OT Start Time: 1035  OT Stop Time: 1058  OT Total Time (min): 23 min    Billable Minutes:Evaluation 15 mins  Self Care/Home Management 8 mins    5/15/2022

## 2022-05-15 NOTE — PLAN OF CARE
Problem: Physical Therapy  Goal: Physical Therapy Goal  Outcome: Met   Initial PT evaluation performed today.  Pt OK for D/C home from PT standpoint.  No DME needs.  Outpatient Cardiopulmonary rehab recommended.  PT to sign off.  OK for OOB to chair and ambulation with nursing staff and O2.

## 2022-05-15 NOTE — SUBJECTIVE & OBJECTIVE
Interval History:  Still having some shortness of breath, had long discussion with patient and daughter about his chronic issues.     Review of Systems   Constitutional:  Positive for activity change. Negative for appetite change, chills, fatigue and fever.   HENT:  Negative for congestion, rhinorrhea, sinus pressure, sinus pain and sore throat.    Eyes:  Negative for photophobia, pain and visual disturbance.   Respiratory:  Positive for shortness of breath. Negative for cough, chest tightness and wheezing.    Cardiovascular:  Negative for chest pain, palpitations and leg swelling.   Gastrointestinal:  Negative for abdominal distention, abdominal pain, constipation, diarrhea, nausea and vomiting.   Genitourinary:  Negative for dysuria, hematuria and urgency.   Musculoskeletal:  Negative for arthralgias, joint swelling and myalgias.   Skin:  Positive for color change and wound. Negative for pallor.   Neurological:  Negative for dizziness, weakness, numbness and headaches.   Hematological:  Does not bruise/bleed easily.   Psychiatric/Behavioral:  Negative for behavioral problems, confusion and hallucinations.    Objective:     Vital Signs (Most Recent):  Temp: 97.7 °F (36.5 °C) (05/15/22 1132)  Pulse: 63 (05/15/22 1132)  Resp: 16 (05/15/22 1132)  BP: 139/64 (05/15/22 1132)  SpO2: 97 % (05/15/22 1132)   Vital Signs (24h Range):  Temp:  [97.7 °F (36.5 °C)-98.6 °F (37 °C)] 97.7 °F (36.5 °C)  Pulse:  [63-78] 63  Resp:  [10-34] 16  SpO2:  [91 %-100 %] 97 %  BP: (104-181)/() 139/64     Weight: (!) 155.3 kg (342 lb 6 oz)  Body mass index is 45.17 kg/m².    Intake/Output Summary (Last 24 hours) at 5/15/2022 1218  Last data filed at 5/15/2022 0900  Gross per 24 hour   Intake 382.44 ml   Output 2775 ml   Net -2392.56 ml        Physical Exam  Vitals and nursing note reviewed.   Constitutional:       General: He is not in acute distress.     Appearance: He is well-developed. He is obese.   HENT:      Head: Normocephalic and  atraumatic.      Right Ear: External ear normal.      Left Ear: External ear normal.      Nose: Nose normal.   Eyes:      Conjunctiva/sclera: Conjunctivae normal.      Pupils: Pupils are equal, round, and reactive to light.   Neck:      Comments: Swelling to right neck  Cardiovascular:      Rate and Rhythm: Normal rate and regular rhythm.   Pulmonary:      Effort: Pulmonary effort is normal. No respiratory distress.      Breath sounds: Normal breath sounds. No wheezing.      Comments: Limited lung volumes  Mild crackles at left base  Abdominal:      General: Bowel sounds are normal. There is no distension.      Palpations: Abdomen is soft.      Tenderness: There is no abdominal tenderness.   Musculoskeletal:         General: No tenderness. Normal range of motion.      Cervical back: Normal range of motion and neck supple.      Right lower leg: No edema.      Left lower leg: No edema.   Skin:     General: Skin is warm and dry.      Comments: Erythema at right chest adjacent to right neck wound   Neurological:      Mental Status: He is alert and oriented to person, place, and time.   Psychiatric:         Thought Content: Thought content normal.       Significant Labs: All pertinent labs within the past 24 hours have been reviewed.    Significant Imaging: I have reviewed all pertinent imaging results/findings within the past 24 hours.

## 2022-05-15 NOTE — ASSESSMENT & PLAN NOTE
Body mass index is 45.17 kg/m². Morbid obesity complicates all aspects of disease management from diagnostic modalities to treatment. Weight loss encouraged and health benefits explained to patient.  -patient reports that he has explored bariatric surgery in the past but was deemed not to be a good candidate given cardiovascular risk factors and age  -consider of semaglutide at discharge for assistance with weight loss -  Patient and family plan to discuss with PCP

## 2022-05-15 NOTE — ASSESSMENT & PLAN NOTE
Patient with Paroxysmal (<7 days) atrial fibrillation which is uncontrolled currently with Beta Blocker and Amiodarone. Patient is currently in atrial fibrillation.[id number] Score: 3. Anticoagulation indicated. Anticoagulation done with eliquis.  Will titrate metoprolol as able given BP.  Appreciate cardiology recs.  Started on amiodarone drip, rate improved.  Amiodarone switched to p.o..  Resumed metoprolol succinate at higher rate.

## 2022-05-15 NOTE — ASSESSMENT & PLAN NOTE
-some erythema and minimal drainage at wound site of recent neck dissection  -started doxycycline.  Would plan for 10-14 day course  -ENT consulted to evaluate  - appears stable

## 2022-05-15 NOTE — ASSESSMENT & PLAN NOTE
Patient with Hypoxic Respiratory failure which is Acute.  he is not on home oxygen. Supplemental oxygen was provided and noted-  .   Signs/symptoms of respiratory failure include- tachypnea, increased work of breathing and respiratory distress. Contributing diagnoses includes - CHF and Obesity Hypoventilation Labs and images were reviewed. Patient Has not had a recent ABG. Will treat underlying causes and adjust management of respiratory failure as follows-   -patient with restrictive lung disease due to obesity and pleural changes related to asbestos exposure  -currently requiring supplemental O2 to maintain normal SpO2.  Had desaturation episode to mid 80s while at rest  -will need walk test for evaluation for home O2  - multifactorial, and chronic - working towards home

## 2022-05-15 NOTE — ASSESSMENT & PLAN NOTE
-does not appear to be in acute renal failure.  Creatinine clearance sufficient at present so as unlikely to interfere with medication dosing.  Will continue to monitor  - stable

## 2022-05-16 NOTE — DISCHARGE SUMMARY
[facility] Medicine  Discharge Summary      Patient Name: Janusz Montgomery Jr.  MRN: [id number]  Patient Class: IP- Inpatient  Admission Date: 5/13/2022  Hospital Length of Stay: 3 days  Discharge Date and Time: No discharge date for patient encounter.  Attending Physician: Romero Martin MD   Discharging Provider: Romero Martin MD  Primary Care Provider: Miguelina Weathers MD      HPI:   Mr. Montgomery is a 77-year-old gentleman with morbid obesity (BMI 45), CAD status post 3 stents, hypertension, PAD, paroxysmal atrial fibrillation on Eliquis, CHARISMA on CPAP q.h.s. (14 cm H2O), asbestosis, CKD 3, history of prostate cancer status post resection, depression, head and neck tumor status post radical neck dissection 1.5 weeks ago.  He presented to our emergency department for elevated heart rate and progressive dyspnea with exertion.  Patient was discharged after neck dissection 1.5 weeks ago and since then has experienced increased dyspnea.  Patient took his SpO2 via pulse oximeter today and found that it was normal but found that his heart rate was elevated to the 170s.  He was directed by an on-call nurse to present to our emergency department.  Reports associated fatigue, orthopnea, cough.  Patient used albuterol multiple times this morning.  Denies PND, lower extremity edema, fever, chills.  Patient has been on a beta-blocker and Eliquis for many years related to the paroxysmal atrial fibrillation.  Though has not been in documented atrial fibrillation for years.  He also notes some redness at his incision site on his right upper chest adjacent to neck.    In the emergency department he was tachycardic to the 230s and hypotensive as low as 70/35.  Labs revealed stable macrocytic anemia.  Leukocyte count below recent admission.  Creatinine elevated to 1.8, slightly above baseline.  Troponin elevated to 0.046.  BNP within normal limits.  Lactate not elevated.  Chest x-ray without acute changes.  Patient  started on amiodarone drip, received 500 cc LR bolus as well as 1.5 L NS bolus, given metoprolol IV 2.5 mg and Toprol XL 25 mg p.o. Admitted to hospital medicine service in the ICU for further management.      * No surgery found *      Hospital Course:   Patient admitted for atrial fibrillation with rapid ventricular response.  Evaluated by Cardiology.  Started on amiodarone drip.  Spontaneously converted to sinus rhythm at around 6:00 p.m. on 05/13.  Amiodarone switched to p.o..  Patient still with acute on chronic hypoxic respiratory failure, requiring low-flow supplemental O2 via nasal cannula to maintain normal SpO2.  Patient has erythema at recent neck dissection wound site consistent with cellulitis.  Started on doxycycline. This has improved and appears stable. He was weaned down to 3 liters NC. Overall, he was feeling better and at baseline. Discussed with patient and family again about chronic shortness of breath being multi-factorial and this taking a long time to improve. Patient is motivated and has Pulmonary follow up. All questions answered, patient ambulating well and tolerating meals. He was discharged home to continue with Home health and follow up appointments.       Goals of Care Treatment Preferences:  Code Status: Full Code    Living Will: Yes              Consults:   Consults (From admission, onward)        Status Ordering Provider     Inpatient consult to Cardiology  Once        Provider:  Benjamin Sandhu MD    Acknowledged RADHA SHIPLEY     Inpatient consult to ENT  Once        Provider:  Etta Higgins MD    Acknowledged RADHA SHIPLEY          No new Assessment & Plan notes have been filed under this hospital service since the last note was generated.  Service: Hospital Medicine    Final Active Diagnoses:    Diagnosis Date Noted POA    PRINCIPAL PROBLEM:  Paroxysmal atrial fibrillation  [I48.0] 07/06/2016 Yes    Acute hypoxemic respiratory failure [J96.01] 05/14/2022 Yes     Asbestosis [J61] 11/05/2020 Yes    CKD (chronic kidney disease) stage 3, GFR 30-59 ml/min [N18.30] 08/24/2020 Yes    Cellulitis [L03.90] 03/15/2017 Yes    Prostate cancer [C61] 02/15/2016 Yes    Morbid obesity [E66.01] 08/14/2015 Yes    Coronary artery disease involving native coronary artery of native heart without angina pectoris s/p RCA stent [I25.10]  Yes    CHARISMA on CPAP [G47.33, Z99.89]  Not Applicable    Other hyperlipidemia [E78.49]  Yes      Problems Resolved During this Admission:       Discharged Condition: stable    Disposition: Home or Self Care    Follow Up:   Follow-up Information     Pito Pop NP. Go on 5/18/2022.    Specialty: Family Medicine  Why: Appointment scheduled for 3:30 p.m.  Contact information:  [address] [facility] [place] [address]  [phone]             Chapito Loredo MD. Go on 6/8/2022.    Specialties: Pulmonary Disease, Sleep Medicine  Why: Appointment scheduled for 1:00 p.m. Placed on waitlist; if an earlier appoitnment is available, the office will reach out to you.  Contact information:  [address]  [address]  [facility] [id number] [phone]             Benjamin Sandhu MD. Go on 6/28/2022.    Specialty: Cardiology  Why: Appointment scheduled for 1:00 p.m. Placed on waitlist; if an earlier appointment is available, the office will reach out to you.  Contact information:  [address]  [address]  [facility] [id number] [phone]             [facility] Follow up.    Why: Home Health agency will reach out to you to schedule first visit. First visit planned for 5/18.  Contact information:  [address] [place] [address] [phone]                     Patient Instructions:      Diet Cardiac     Notify your health care provider if you experience any of the following:  temperature >100.4     Notify your health care provider if you experience any of the following:  persistent nausea and vomiting or diarrhea     Notify your health care  provider if you experience any of the following:  severe uncontrolled pain     Notify your health care provider if you experience any of the following:  difficulty breathing or increased cough     Notify your health care provider if you experience any of the following:  redness, tenderness, or signs of infection (pain, swelling, redness, odor or green/yellow discharge around incision site)     Notify your health care provider if you experience any of the following:  severe persistent headache     Notify your health care provider if you experience any of the following:  worsening rash     Notify your health care provider if you experience any of the following:  persistent dizziness, light-headedness, or visual disturbances     Notify your health care provider if you experience any of the following:  increased confusion or weakness     Activity as tolerated       Significant Diagnostic Studies: Labs: All labs within the past 24 hours have been reviewed    Pending Diagnostic Studies:     None         Medications:  Reconciled Home Medications:      Medication List      START taking these medications    amiodarone 200 MG Tab  Commonly known as: PACERONE  Take 1 tablet (200 mg total) by mouth 2 (two) times daily.     doxycycline 100 MG tablet  Commonly known as: VIBRA-TABS  Take 1 tablet (100 mg total) by mouth every 12 (twelve) hours. for 5 days        CHANGE how you take these medications    albuterol-ipratropium 2.5 mg-0.5 mg/3 mL nebulizer solution  Commonly known as: DUO-NEB  Take 3 mLs by nebulization every 6 (six) hours as needed for Wheezing. Rescue  What changed:   · when to take this  · reasons to take this     atorvastatin 40 MG tablet  Commonly known as: LIPITOR  Take 1 tablet (40 mg total) by mouth once daily.  What changed: when to take this     olmesartan 5 MG Tab  Commonly known as: BENICAR  Take 4 tablets (20 mg total) by mouth once daily.  What changed: how much to take     venlafaxine 75 MG  tablet  Commonly known as: EFFEXOR  Take 2 tablets (150 mg total) by mouth 2 (two) times daily.  What changed: how much to take     vitamin D 1000 units Tab  Commonly known as: VITAMIN D3  Take 1 tablet (1,000 Units total) by mouth once daily.  What changed: how much to take        CONTINUE taking these medications    artificial tears 0.5 % ophthalmic solution  Commonly known as: ISOPTO TEARS  Place 1 drop into both eyes as needed.     clopidogreL 75 mg tablet  Commonly known as: PLAVIX  Take 1 tablet by mouth once daily     dextromethorphan-guaiFENesin  mg  mg per 12 hr tablet  Commonly known as: MUCINEX DM  Take 1 tablet by mouth 2 (two) times daily. for 10 days     ELIQUIS 5 mg Tab  Generic drug: apixaban  Take 1 tablet by mouth twice daily     fluticasone propionate 50 mcg/actuation nasal spray  Commonly known as: FLONASE  1 spray (50 mcg total) by Each Nostril route once daily.     furosemide 20 MG tablet  Commonly known as: LASIX  Take 1 tablet (20 mg total) by mouth once daily.     gabapentin 300 MG capsule  Commonly known as: NEURONTIN  Take 1 capsule (300 mg total) by mouth 3 (three) times daily.     melatonin 5 mg  Commonly known as: MELATIN  Take 10 mg by mouth nightly.     metoprolol succinate 25 MG 24 hr tablet  Commonly known as: TOPROL-XL  Take 1 tablet by mouth once daily     nitroGLYCERIN 0.4 MG SL tablet  Commonly known as: NITROSTAT  Place 1 tablet (0.4 mg total) under the tongue every 5 (five) minutes as needed for Chest pain.     oxybutynin 5 MG Tr24  Commonly known as: DITROPAN-XL  Take 1 tablet (5 mg total) by mouth once daily.     oxyCODONE-acetaminophen 5-325 mg per tablet  Commonly known as: PERCOCET  Take 2 tablets by mouth every 6 (six) hours as needed for Pain.     pantoprazole 40 MG tablet  Commonly known as: PROTONIX  Take 1 tablet (40 mg total) by mouth once daily.     sildenafiL 100 MG tablet  Commonly known as: VIAGRA  Take 1 tablet (100 mg total) by mouth daily as  needed.     tamsulosin 0.4 mg Cap  Commonly known as: FLOMAX  Take 1 capsule (0.4 mg total) by mouth once daily.     traZODone 50 MG tablet  Commonly known as: DESYREL  Take 1 tablet (50 mg total) by mouth nightly as needed for Insomnia.        STOP taking these medications    albuterol 90 mcg/actuation inhaler  Commonly known as: VENTOLIN HFA     omeprazole 20 MG capsule  Commonly known as: PRILOSEC     VITAMIN B-12 ORAL            Indwelling Lines/Drains at time of discharge:   Lines/Drains/Airways     None                 Time spent on the discharge of patient: >35 minutes         Romero Martin MD  Department of Hospital Medicine  [facility]

## 2022-05-16 NOTE — TELEPHONE ENCOUNTER
Spoke with Radha in regards to patient's preference for Radiation Oncology specialist. Patient would like to be seen by Dr Moser at [facility].

## 2022-05-16 NOTE — PROGRESS NOTES
Consult called in this morning, notified staff this is a patient of dr arboleda and she seen him Friday afternoon when he was admitted. Please see notes in chart from 5/13.

## 2022-05-16 NOTE — PLAN OF CARE
[facility] - Telemetry  Discharge Final Note    Primary Care Provider: Miguelina Weathers MD    Expected Discharge Date: 5/16/2022  SW gave patient follow up appointment for PCP, cardiology, pulmonary, and home health. SW discussed patient responsibilities for MANAGING YOUR HEALTH AT HOME  EDUCATION:  Things You are responsible For To Manage Your Care At Home:  1.    Getting your prescriptions filled   2.    Taking your medications as directed, DO NOT MISS ANY DOSES!  3.    Going to your follow-up doctor appointment. This is important because it  allow the doctor to monitor your progress and determine if  any changes need to made to your treatment plan.  Call [facility] number for new or repeated problems / symptoms   Call 911 for CP and / or SOB  SW informed nurse, Cedric, that patient is ready for discharge from case management standpoint.     Final Discharge Note (most recent)       Final Note - 05/16/22 1201          Final Note    Assessment Type Final Discharge Note     Anticipated Discharge Disposition Home-Health Care Svc     What phone number can be called within the next 1-3 days to see how you are doing after discharge? [phone]     Hospital Resources/Appts/Education Provided Appointments scheduled and added to AVS;Post-Acute resouces added to AVS        Post-Acute Status    Post-Acute Authorization Home Health     Home Health Status Set-up Complete/Auth obtained     Coverage [employer] Medicare     Discharge Delays None known at this time                     Important Message from Medicare  Important Message from Medicare regarding Discharge Appeal Rights: Given to patient/caregiver, Explained to patient/caregiver, Signed/date by patient/caregiver (SW explained IMM to patient. Patient expressed understanding and signed IMM. Copy of IMM given to patient.)     Date IMM was signed: 05/16/22  Time IMM was signed: 1118      Follow-up Information     Pito Pop NP. Go on 5/18/2022.    Specialty:  Family Medicine  Why: Appointment scheduled for 3:30 p.m.  Contact information:  [address] [place] [id number]  [phone]             Chapito Loredo MD. Go on 6/8/2022.    Specialties: Pulmonary Disease, Sleep Medicine  Why: Appointment scheduled for 1:00 p.m. Placed on waitlist; if an earlier appoitnment is available, the office will reach out to you.  Contact information:  [address] [facility]  [address]  [facility] [address]  [phone]             Benjamin Sandhu MD. Go on 6/28/2022.    Specialty: Cardiology  Why: Appointment scheduled for 1:00 p.m. Placed on waitlist; if an earlier appointment is available, the office will reach out to you.  Contact information:  [address]  [address]  [facility] [id number] [phone]             [place] [facility] Follow up.    Why: Home Health agency will reach out to you to schedule first visit. First visit planned for 5/18.  Contact information:  [address] [place] [address] [phone]

## 2022-05-16 NOTE — TELEPHONE ENCOUNTER
----- Message from Isaac Yanez sent at 5/16/2022  9:04 AM CDT -----  Type: Patient Call Back    Who called: ISAAC (Pts Step Daughter)    What is the request in detail: needs info for the oncologist Dr rodriguez    Can the clinic reply by [facility]?no    Would the patient rather a call back or a response via [facility]? yes    Best call back number: [phone]    She said he will need a ct but Janusz ricketts dye

## 2022-05-16 NOTE — TELEPHONE ENCOUNTER
3rd floor nurses station called in consult for mr galindo this morning. notified them that  You have seen the patent Friday afternoon, that we know mr galindo due to him being our patient but I will notify you again. They know that you are out the office today.

## 2022-05-16 NOTE — PROGRESS NOTES
Faxed radiation referral to [facility] Reagan Moser's office fax at [phone]. Office telephone number is [phone]

## 2022-05-16 NOTE — PLAN OF CARE
[facility] CASE MANAGEMENT                  WRITTEN DISCHARGE INFORMATION      APPOINTMENTS AND RESOURCES TO HELP YOU MANAGE YOUR CARE AT HOME BASED ON YOUR PREFERENCES:   Follow-up Information     Pito Pop NP. Go on 5/18/2022.    Specialty: Family Medicine  Why: Appointment scheduled for 3:30 p.m.  Contact information:  [address] [facility]  [place] [address]  [phone]             Chapito Loredo MD. Go on 6/8/2022.    Specialties: Pulmonary Disease, Sleep Medicine  Why: Appointment scheduled for 1:00 p.m. Placed on waitlist; if an earlier appoitnment is available, the office will reach out to you.  Contact information:  [address] [facility]  [address]  [place] [id number] [phone]             Benjamin Sandhu MD. Go on 6/28/2022.    Specialty: Cardiology  Why: Appointment scheduled for 1:00 p.m. Placed on waitlist; if an earlier appointment is available, the office will reach out to you.  Contact information:  [address]  [address]  [facility] [address] [phone]             [facility] Follow up.    Why: Home Health agency will reach out to you to schedule first visit. First visit planned for 5/18.  Contact information:  [address] [place] [address] [phone]                       Healthy Living Instructions to HELP MANAGE YOUR CARE AT HOME:  Things You are responsible for:  1.    Getting your prescriptions filled   2.    Taking your medications as directed, DO NOT MISS ANY DOSES!  3.    Following the diet and exercise recommended by your doctor  4.    Going to your follow-up doctor appointment. This is important because it allows the doctor to monitor your progress and determine if any changes need to made to your treatment plan.  5. If you have any questions about MANAGING YOUR CARE AT HOME Call the Nurse Care Line for 24/7 Assistance [phone]       Please answer any calls you may receive from [facility]. We want to  continue to support you as you manage your healthcare needs. [facility] is happy to have the opportunity to serve you.      Thank you for choosing [facility] for your healthcare needs!  Your [facility] Case Management Team,    Kayleigh Stahl   II  Care Management

## 2022-05-16 NOTE — PLAN OF CARE
05/16/22 1146   Post-Acute Status   Post-Acute Authorization Home Health   Home Health Status Referrals Sent   Coverage [employer] Medicare   Discharge Delays (!) Post-Acute Set-up   Discharge Plan   Discharge Plan A Home Health   Discharge Plan B Home with family     RACHID spoke with pt regarding HH recommendation. Pt in agreement, stated his preference is [facility]. RACHID sent referral via [facility] to [facility].

## 2022-05-16 NOTE — PLAN OF CARE
[facility] - Telemetry      HOME HEALTH ORDERS  FACE TO FACE ENCOUNTER    Patient Name: Janusz Montgomery Jr.  YOB: 1945    PCP: Miguelina Weathers MD   PCP Address: [address] [place] [address]  PCP Phone Number: [phone]  PCP Fax: [phone]    Encounter Date: 5/13/22    Admit to Home Health    Diagnoses:  Active Hospital Problems    Diagnosis  POA    *Paroxysmal atrial fibrillation  [I48.0]  Yes     - on ASA 325mg daily  - followed by cardiology, Dr. Guardado      Acute hypoxemic respiratory failure [J96.01]  Yes    Asbestosis [J61]  Yes    CKD (chronic kidney disease) stage 3, GFR 30-59 ml/min [N18.30]  Yes     -followed by nephrology.          Cellulitis [L03.90]  Yes    Prostate cancer [C61]  Yes     - followed by urology, Dr. Young  - s/p pellets summer 2018      Morbid obesity [E66.01]  Yes     -gaining weight despite dietary restriction.        Coronary artery disease involving native coronary artery of native heart without angina pectoris s/p RCA stent [I25.10]  Yes    CHARISMA on CPAP [G47.33, Z99.89]  Not Applicable     -Severe ahi of 29. apap 14-16  -Using and benefiting  -we discussed at length about Respironics recall.  Patient already registered his apap.  Risk and benefits discussed.  Per patient, he had spoken with [employer] and they will replace his machine if order is written.       Other hyperlipidemia [E78.49]  Yes      Resolved Hospital Problems   No resolved problems to display.       Follow Up Appointments:  Future Appointments   Date Time Provider Department Center   5/16/2022  2:00 PM Pito Pop NP [facility] FAM MED [place]   5/25/2022 10:30 AM [facility] POD [place]   5/31/2022  2:30 PM Pito Claros DO [facility] PAINMG [place]   6/6/2022  1:40 PM LAB, APPOINTMENT [facility] NOM LAB IM [address] [facility]   6/6/2022  1:50 PM LAB, SPECIMEN [facility] INTMED NOM SPLABIM [address]   6/8/2022  1:00 PM Cahpito Loredo MD [facility]   6/10/2022  10:00 AM Bianca Kumar MD [facility] DERM [address]   6/13/2022  2:30 PM Malini Pagan NP [facility] NEPHRO [address]   7/11/2022  2:20 PM Miguelina Weathers MD [facility] FAM MED [place]   8/1/2022  2:30 PM Benjamin Sandhu MD [facility] CARDIO [place]       Allergies:  Review of patient's allergies indicates:   Allergen Reactions    Ciprofloxacin Rash     Diffuse pruritic morbilliform rash developed 3/15/2017 after dose of cipro; previously in 2/2017 he had rash/fevers after initiation of cipro    Zosyn [piperacillin-tazobactam] Rash     Diffuse pruritic morbilliform rash developed 3/15/2017.  Then, 430am dose on 3/16 and rash worsened with SOB/tachypnea but no hypoxemia.     Bacitracin Itching and Rash     Violaceous rash in area of topical Tx.        Medications: Review discharge medications with patient and family and provide education.      I have seen and examined this patient within the last 30 days. My clinical findings that support the need for the home health skilled services and home bound status are the following:no   Weakness/numbness causing balance and gait disturbance due to Malignancy/Cancer and Surgery making it taxing to leave home.     Diet:   2 gram sodium diet    Labs:  n/a    Referrals/ Consults  Aide to provide assistance with personal care, ADLs, and vital signs.    Activities:   activity as tolerated    Nursing:   Agency to admit patient within 24 hours of hospital discharge unless specified on physician order or at patient request    SN to complete comprehensive assessment including routine vital signs. Instruct on disease process and s/s of complications to report to MD. Review/verify medication list sent home with the patient at time of discharge  and instruct patient/caregiver as needed. Frequency may be adjusted depending on start of care date.     Skilled nurse to perform up to 3 visits PRN for symptoms related to diagnosis    Notify MD if SBP > 160 or < 90; DBP > 90 or < 50; HR > 120 or < 50;  Temp > 101; O2 < 88%; Other:       Ok to schedule additional visits based on staff availability and patient request on consecutive days within the home health episode.    Home Health Aide:  Nursing     Wound Care Orders  no    I certify that this patient is confined to his home and needs intermittent skilled nursing care.

## 2022-05-16 NOTE — PLAN OF CARE
05/16/22 1145   Medicare Message   Important Message from Medicare regarding Discharge Appeal Rights Given to patient/caregiver;Explained to patient/caregiver;Signed/date by patient/caregiver  (SW explained IMM to patient. Patient expressed understanding and signed IMM. Copy of IMM given to patient.)   Date IMM was signed 05/16/22   Time IMM was signed 8830

## 2022-05-17 NOTE — TELEPHONE ENCOUNTER
Spoke with patient's daughter she states an order needs to be sent to the cancer center for chemo. I place the order for Dr Shanti hurd the received the fax. Do you know what she is referring to?

## 2022-05-17 NOTE — TELEPHONE ENCOUNTER
----- Message from Irene Jeffery MA sent at 5/13/2022  3:51 PM CDT -----  Joanie Uriarte V Staff  Caller: Unspecified (Today,  3:44 PM)  Type: Patient Call Back     Who called:pt's step daughter rj [phone]     What is the request in detail:daughter calling to inform doctor that dad is in hospital and is having problems breathing. Saturation is normal but cant catch breath. Requesting doctor to come visit in hospital this weekend at [facility]. Call rj     Can the clinic reply by [facility]?     Would the patient rather a call back or a response via [facility]? call     Best call back number:[phone] (home)       Additional Information:

## 2022-05-17 NOTE — TELEPHONE ENCOUNTER
Step daughter called back with another question. Would like to know if it would beneficial for a PET Scan to be done on Mr. Boudreaux. Explained to her that we had a Head/ neck CT along with he had a CT chest done recently as well. Please advsie

## 2022-05-17 NOTE — TELEPHONE ENCOUNTER
----- Message from Kristie Newby MA sent at 5/17/2022 11:35 AM CDT -----  Type: Patient Call Back    Who called:Lino Garcia    What is the request in detail:she remembered she had a few more questions to ask Nurse Kelley    Can the clinic reply by [facility]?no    Would the patient rather a call back or a response via [facility]? yes    Best call back number:[phone]

## 2022-05-17 NOTE — DISCHARGE SUMMARY
[facility]  Otorhinolaryngology-Head & Neck Surgery  Discharge Summary      Patient Name: Janusz Montgomery Jr.  MRN: [id number]  Admission Date: 5/4/2022  Hospital Length of Stay: 7 days  Discharge Date and Time: 5/11/2022  5:50 PM  Attending Physician: No att. providers found   Discharging Provider: Chandler Pena MD  Primary Care Provider: Miguelina Weathers MD         Procedure(s) (LRB):  RADICAL NECK DISSECTION (Right)  LARYNGOSCOPY, DIRECT possible biopsy (N/A)     Hospital Course:pt  Admitted postop, had issues with breathing which required prolonged hospital stay. No wound issues while in hospital. Was tolerating pain and diet prior to dc and had improvement in breathing    Consults:   Consults (From admission, onward)        Status Ordering Provider     Inpatient consult to Pulmonology  Once        Provider:  Fredo Manrique MD    Completed VERONICA BURGOS     Inpatient consult to Social Work  Once        Provider:  (Not yet assigned)    Completed CHANDLER PENA     Inpatient consult to Social Work  Once        Provider:  (Not yet assigned)    Completed CHANDLER PENA          Significant Diagnostic Studies:chest xray     Pending Diagnostic Studies:     None        Final Active Diagnoses:    Diagnosis Date Noted POA    PRINCIPAL PROBLEM:  Head and neck cancer [C76.0] 05/05/2022 Yes    Restrictive lung disease [J98.4] 05/07/2022 Yes    Constipation [K59.00] 05/06/2022 Yes    Chronic heart failure with preserved ejection fraction [I50.32] 02/04/2022 Yes    Chronic respiratory failure with hypoxia [J96.11] 02/04/2022 Yes    Macrocytic anemia [D53.9] 11/09/2020 Yes    Asbestosis [J61] 11/05/2020 Yes    CKD (chronic kidney disease) stage 3, GFR 30-59 ml/min [N18.30] 08/24/2020 Yes    Paroxysmal atrial fibrillation  [I48.0] 07/06/2016 Yes    Morbid obesity [E66.01] 08/14/2015 Not Applicable    GERD (gastroesophageal reflux disease) [K21.9] 07/25/2012 Yes    CHARISMA on CPAP [G47.33, Z99.89]   "Not Applicable    Coronary artery disease involving native coronary artery of native heart without angina pectoris s/p RCA stent [I25.10]  Yes      Problems Resolved During this Admission:    Diagnosis Date Noted Date Resolved POA    Hypoxia [R09.02] 05/06/2022 05/07/2022 Yes      Discharged Condition: good    Disposition: Home or Self Care    Follow Up:   Follow-up Information     Miguelina Weathers MD. Schedule an appointment as soon as possible for a visit in 1 week(s).    Specialties: Internal Medicine, Pediatrics  Contact information:  [address] [facility] [id number] [phone]             [facility] Follow up.    Why: Home health  Contact information:  [address] [address] [phone]           [facility] Follow up.    Specialty: DME Provider  Why: Oxygen for home use and nebulizer  Contact information:  [address] [facility] [address]  [facility] [address]  [phone]                       Patient Instructions:      NEBULIZER KIT (SUPPLIES) FOR HOME USE     Order Specific Question Answer Comments   Height: 6' 1" (1.854 m)    Weight: 158.8 kg (350 lb)    Does patient have medical equipment at home? walker, rolling    Does patient have medical equipment at home? bedside commode    Does patient have medical equipment at home? CPAP    Length of need (1-99 months): 99    Mask or Mouthpiece? Mouthpiece      NEBULIZER KIT (SUPPLIES) FOR HOME USE     Order Specific Question Answer Comments   Height: 6' 1" (1.854 m)    Weight: 158.8 kg (350 lb)    Does patient have medical equipment at home? walker, rolling    Does patient have medical equipment at home? bedside commode    Does patient have medical equipment at home? CPAP    Length of need (1-99 months): 99    Mask or Mouthpiece? Mouthpiece      OXYGEN FOR HOME USE     Order Specific Question Answer Comments   Liter Flow 4    Duration With activity    Qualifying Test Performed at: Activity    Oxygen " "saturation at rest 90    Oxygen saturation with activity 84    Oxygen saturation with activity on oxygen 93    Portable mode: continuous    Route nasal cannula    Device: home concentrator with portable tanks    Length of need (in months): 99 mos    Patient condition with qualifying saturation Other - List qualifying diagnosis and code    Select a diagnosis & list the code in the comments Asbestosis [6514225]    Select a diagnosis & list the code in the comments Restrictive lung disease [401371]    Height: 6' 1" (1.854 m)    Weight: 158.8 kg (350 lb)    Alternative treatment measures have been tried or considered and deemed clinically ineffective. Yes      Ambulatory referral/consult to Outpatient Case Management   Referral Priority: Routine Referral Type: Consultation   Referral Reason: Specialty Services Required   Number of Visits Requested: 1     Ambulatory referral/consult to Physical/Occupational Therapy   Standing Status: Future   Referral Priority: Routine Referral Type: Physical Medicine   Referral Reason: Specialty Services Required   Number of Visits Requested: 1     HOME HEALTH ORDERS   Order Comments: Subsequent Home Health Orders    Current Medications:  Current Facility-Administered Medications:  acyclovir capsule 800 mg, 800 mg, Oral, BID, Etta Higgins MD, 800 mg at 05/06/22 0921  albuterol-ipratropium 2.5 mg-0.5 mg/3 mL nebulizer solution 3 mL, 3 mL, Nebulization, Q4H PRN, Etta Higgins MD, 3 mL at 05/06/22 0932  apixaban tablet 5 mg, 5 mg, Oral, BID, Etta Higgins MD, 5 mg at 05/06/22 1051  artificial tears 0.5 % ophthalmic solution 1 drop, 1 drop, Both Eyes, PRN, Etta Higgins MD  clopidogreL tablet 75 mg, 75 mg, Oral, Daily, Etta Higgins MD, 75 mg at 05/06/22 1051  furosemide tablet 20 mg, 20 mg, Oral, Daily, Etta Higgins MD, 20 mg at 05/06/22 0921  gabapentin capsule 300 mg, 300 mg, Oral, TID, Etta Higgins MD, 300 mg at 05/06/22 1440  losartan split tablet " 12.5 mg, 12.5 mg, Oral, Daily, Etta Higgins MD, 12.5 mg at 05/06/22 0924  melatonin tablet 9 mg, 9 mg, Oral, Nightly, Etta Higgins MD, 9 mg at 05/05/22 2027  metoprolol succinate (TOPROL-XL) 24 hr tablet 25 mg, 25 mg, Oral, Daily, Etta Higgins MD, 25 mg at 05/06/22 0924  morphine injection 2 mg, 2 mg, Intravenous, Q6H PRN, Etta Higgins MD  ondansetron injection 4 mg, 4 mg, Intravenous, Q12H PRN, Etta Higgins MD  oxybutynin 24 hr tablet 5 mg, 5 mg, Oral, Daily, Etta Higgins MD, 5 mg at 05/06/22 0921  oxyCODONE-acetaminophen  mg per tablet 1 tablet, 1 tablet, Oral, Q6H PRN, Etta Higgins MD, 1 tablet at 05/06/22 0921  sodium chloride 0.9% flush 10 mL, 10 mL, Intravenous, PRN, Radhika Coombs MD  sodium chloride 0.9% flush 10 mL, 10 mL, Intravenous, PRN, Etta Higgins MD  tamsulosin 24 hr capsule 0.4 mg, 1 capsule, Oral, Daily, Etta Higgins MD, 0.4 mg at 05/06/22 0924  traZODone tablet 50 mg, 50 mg, Oral, Nightly PRN, Etat Higgins MD  venlafaxine tablet 150 mg, 150 mg, Oral, BID, Etta Higgins MD, 150 mg at 05/06/22 0925        Nursing:   Home Oxygen:  Assess oxygen saturation via pulse oximeter as needed for increase in SOB.    Diet:   regular diet    Activities:   ambulate in house         Referrals/ Consults  Physical Therapy to evaluate and treat. Evaluate for home safety and equipment needs; Establish/upgrade home exercise program. Perform / instruct on therapeutic exercises, gait training, transfer training, and Range of Motion.  Occupational Therapy to evaluate and treat. Evaluate home environment for safety and equipment needs. Perform/Instruct on transfers, ADL training, ROM, and therapeutic exercises.    Home Health Aide:  Physical Therapy Three times weekly and Occupational Therapy three times weekly     Order Specific Question Answer Comments   What Home Health Agency is the patient currently using? [facility]      No  dressing needed   Order Comments: Ok to shower, can place gauze over drain hole until stops draining/closes to prevent drainage from getting on clothing     Weight bearing restrictions (specify):   Order Comments: No lifting over 5 pounds     Medications:  Reconciled Home Medications:      Medication List      START taking these medications    dextromethorphan-guaiFENesin  mg  mg per 12 hr tablet  Commonly known as: MUCINEX DM  Take 1 tablet by mouth 2 (two) times daily. for 10 days     oxyCODONE-acetaminophen 5-325 mg per tablet  Commonly known as: PERCOCET  Take 2 tablets by mouth every 6 (six) hours as needed for Pain.  Replaces: oxyCODONE-acetaminophen  mg per tablet     pantoprazole 40 MG tablet  Commonly known as: PROTONIX  Take 1 tablet (40 mg total) by mouth once daily.        CHANGE how you take these medications    atorvastatin 40 MG tablet  Commonly known as: LIPITOR  Take 1 tablet (40 mg total) by mouth once daily.  What changed: when to take this     ELIQUIS 5 mg Tab  Generic drug: apixaban  Take 1 tablet by mouth twice daily  What changed: Another medication with the same name was removed. Continue taking this medication, and follow the directions you see here.     venlafaxine 75 MG tablet  Commonly known as: EFFEXOR  Take 2 tablets (150 mg total) by mouth 2 (two) times daily.  What changed: how much to take     vitamin D 1000 units Tab  Commonly known as: VITAMIN D3  Take 1 tablet (1,000 Units total) by mouth once daily.  What changed: how much to take        CONTINUE taking these medications    artificial tears 0.5 % ophthalmic solution  Commonly known as: ISOPTO TEARS  Place 1 drop into both eyes as needed.     clopidogreL 75 mg tablet  Commonly known as: PLAVIX  Take 1 tablet by mouth once daily     fluticasone propionate 50 mcg/actuation nasal spray  Commonly known as: FLONASE  1 spray (50 mcg total) by Each Nostril route once daily.     furosemide 20 MG tablet  Commonly known  as: LASIX  Take 1 tablet (20 mg total) by mouth once daily.     gabapentin 300 MG capsule  Commonly known as: NEURONTIN  Take 1 capsule (300 mg total) by mouth 3 (three) times daily.     melatonin 5 mg  Commonly known as: MELATIN  Take 10 mg by mouth nightly.     metoprolol succinate 25 MG 24 hr tablet  Commonly known as: TOPROL-XL  Take 1 tablet by mouth once daily     nitroGLYCERIN 0.4 MG SL tablet  Commonly known as: NITROSTAT  Place 1 tablet (0.4 mg total) under the tongue every 5 (five) minutes as needed for Chest pain.     oxybutynin 5 MG Tr24  Commonly known as: DITROPAN-XL  Take 1 tablet (5 mg total) by mouth once daily.     sildenafiL 100 MG tablet  Commonly known as: VIAGRA  Take 1 tablet (100 mg total) by mouth daily as needed.     tamsulosin 0.4 mg Cap  Commonly known as: FLOMAX  Take 1 capsule (0.4 mg total) by mouth once daily.     traZODone 50 MG tablet  Commonly known as: DESYREL  Take 1 tablet (50 mg total) by mouth nightly as needed for Insomnia.        STOP taking these medications    aminophylline 250 mg/10 mL injection     gentamicin 0.1 % ointment  Commonly known as: GARAMYCIN     multivitamin per tablet  Commonly known as: THERAGRAN     mupirocin 2 % ointment  Commonly known as: BACTROBAN     omeprazole 20 MG capsule  Commonly known as: PRILOSEC     oxyCODONE-acetaminophen  mg per tablet  Commonly known as: PERCOCET  Replaced by: oxyCODONE-acetaminophen 5-325 mg per tablet     VITAMIN B-12 ORAL            Etta Higgins MD  Otorhinolaryngology-Head & Neck Surgery  [facility] Surg

## 2022-05-17 NOTE — TELEPHONE ENCOUNTER
----- Message from Radha Joclydeemmanuel sent at 5/17/2022 11:03 AM CDT -----  Type: Patient Call Back    Who called: Radha     What is the request in detail:  need to send order to cancer center for chemo    Can the clinic reply by [facility]? no    Would the patient rather a call back or a response via [facility]? yes    Best call back number;  [phone]

## 2022-05-18 NOTE — TELEPHONE ENCOUNTER
Spoke with patient he was returning a telephone he thought was from our clinic. Patient was advised to call Dr Pop's office considering he has an appointment with their clinic this afternoon.

## 2022-05-18 NOTE — TELEPHONE ENCOUNTER
----- Message from Norma Riley sent at 5/18/2022  8:22 AM CDT -----  Type: Patient Call Back    Who called: wife     What is the request in detail: returning call     Can the clinic reply by [facility]?    Would the patient rather a call back or a response via [facility]? Call     Best call back number: [phone] (home)

## 2022-05-23 NOTE — TELEPHONE ENCOUNTER
----- Message from Malini Pagan NP sent at 5/23/2022  4:00 PM CDT -----  Regarding: RE: Call Back Request  They are due for follow up in June.    We can trial off the renal diet and stick to the plain low sodium and see how his labs look when I see him back. Please make sure he gets scheduled for June though.    ----- Message -----  From: Yuly Fenton MA  Sent: 5/23/2022   3:23 PM CDT  To: Malini Pagan NP  Subject: FW: Call Back Request                            Hi, I spoke with pt's daughter again. So her questions are concerning the amount of sodium in the meals they  are currently receiving through the insurance.  She said she has located a company called [employer] that has an low sodium menu and would like to know if she can substitute this for the renal diet. And last she would like to know if she can possibly see another dietician, there is one on the [facility] that she would like to consult. Please advise.  ----- Message -----  From: Coco Hodge  Sent: 5/23/2022   2:47 PM CDT  To: Ej Nayak Staff  Subject: Call Back Request                                Has questions regarding diet  Asking for a call back to discus   [phone]

## 2022-05-24 NOTE — PROGRESS NOTES
Outpatient Care Management   - Low Risk Patient Assessment    Patient: Janusz Montgomery Jr.  MRN:  [id number]  Date of Service:  5/24/2022  Completed by:  Flavia Castillo LMSW  Referral Date: 05/09/2022  Program: OPCM Low Risk    Reason for Visit   Patient presents with    OPCM SW First Assessment Attempt     05/23/2022    Social Work Assessment - Low/Mod Risk     05/24/2022    Plan Of Care     05/24/2022    Case Closure     05/24/2022       Brief Summary: Patient gave spouse permission to complete assessment on his behalf. Spouse reports she and patient reside together. Spouse reports she assist patient with ADL's(bathing). Spouse denied patient uses any assisted devices to ambulate. Spouse denied patient needs assistance with food, shelter, medication or medical. Spouse provides transportation to and from appointments. Spouse requesting [employer] OTC catalog. Spouse reports patient has an MPOA and or POA, however document not on file. Patient will provide copy on next appointment. Spouse denied any current symptoms mailed all available resources as requested.       Patient Summary     OPCM Social Work Assessment (Low/Moderate Risk)    General  Level of Caregiver support: Caregiver currently provides assistance  Have you had to make a decision between paying for any of the following in the last 2 months?: None  Transportation means: Family  Employment status: Retired and not working  Current symptoms: None  Assessments  Was the PHQ Depression Screening completed this visit?: No (Comment: completed within last 30 days)         Complex Care Plan     Care plan was discussed and completed today with input from patient and/or caregiver.    Patient Instructions     No follow-ups on file.    Todays OPCM Self-Management Care Plan was developed with the patients/caregivers input and was based on identified barriers from todays assessment.  Goals were written today with the patient/caregiver and the patient  has agreed to work towards these goals to improve his/her overall well-being. Patient verbalized understanding of the care plan, goals, and all of today's instructions. Encouraged patient/caregiver to communicate with his/her physician and health care team about health conditions and the treatment plan.  Provided my contact information today and encouraged patient/caregiver to call me with any questions as needed.

## 2022-05-24 NOTE — TELEPHONE ENCOUNTER
----- Message from Alise Yarbrough RN sent at 5/24/2022  3:18 PM CDT -----  Regarding: RE: Scheduling  Appt made FYI Dr Miguel is leaving [facility]  Dr Delgado will be seeing the H/N patients  ----- Message -----  From: Candi Qureshi MA  Sent: 5/24/2022   3:11 PM CDT  To: Lamont Lanza Staff  Subject: Scheduling                                       Hello,    A referral has been placed for attached patient to be scheduled. Would you be able to assist with scheduling this patient.          Thanks In Advance,  KATHRINE Christopher.

## 2022-05-24 NOTE — TELEPHONE ENCOUNTER
Spoke with Ms Montgomery in regards to Dr Moser not taking patient's insurance. Dr Higgins put in a internal referral for radiation oncology. Called to inform patient of the changes patient's wife Bri [place] him to his appointments. She states she is talking with his insurance company to make changes to his plan. They would like to continue seeing Dr Moser act [facility]. Patient's wife states she will give me a call back once she speaks with his insurance company.

## 2022-05-25 NOTE — PROGRESS NOTES
"Subjective:       Patient ID: Janusz Montgomery Jr. is a 77 y.o. male.    Vitals:  height is 6' 1" (1.854 m) and weight is 151.5 kg (334 lb) (abnormal). His temporal temperature is 97.2 °F (36.2 °C). His blood pressure is 118/65 and his pulse is 75. His respiration is 20 and oxygen saturation is 97%.     Chief Complaint: Dizziness and Facial Swelling (Sore on iside of nose)    Patient is a 77-year-old male with medical history listed below presents with dizziness the past 2-3 days.  States that he feels off balance and that room is spinning.  States that dizziness is triggered by sudden changes position, such as when he suddenly sits up from a lying position.  Also notes that he has not been drinking as much fluids these past few days.  The dizziness resolves after he steadies himself or sits down after 1 or 2 minutes.  Also complaining of swelling, tenderness, redness to his nose for about past 2-3 days. Hx of Basal cell carcinoma and squamous cell carcinoma. Denies any chest pain, worsening shortness of breath, focal weakness or sensation loss, numbness, tingling, headache, acute vision changes, syncope.    Dizziness:   Chronicity:  New  Onset:  Yesterday  Progression since onset:  Unchanged  Duration:  1 minute  Dizziness characteristics:  Off-balanceno ear pain, no fever, no nausea, no palpitations, no facial weakness and no chest pain.  Aggravated by:  Getting up  Treatments tried:  Nothing   PMH includes: ear infections.no ear trauma, no ear surgery and no ear tubes.      Constitution: Negative for chills and fever.   HENT: Negative for ear pain, ear discharge, congestion and sore throat.    Neck: Negative for neck pain and neck stiffness.   Cardiovascular: Negative for chest pain, leg swelling, palpitations and sob on exertion.   Respiratory: Positive for shortness of breath (chronic). Negative for chest tightness.    Gastrointestinal: Negative for nausea.   Skin: Positive for rash and erythema.   Neurological: " Positive for dizziness. Negative for history of vertigo, passing out, numbness and tingling.       Objective:      Physical Exam   Constitutional: He is oriented to person, place, and time. He appears well-developed.  Non-toxic appearance. He does not appear ill. No distress.   HENT:   Head: Normocephalic and atraumatic.          Comments: Negative dina hallpike  Ears:   Right Ear: Hearing, tympanic membrane, external ear and ear canal normal.   Left Ear: Hearing, tympanic membrane, external ear and ear canal normal.   Nose: Nose normal. No mucosal edema, rhinorrhea or nasal deformity. No epistaxis. Right sinus exhibits no maxillary sinus tenderness and no frontal sinus tenderness. Left sinus exhibits no maxillary sinus tenderness and no frontal sinus tenderness.   Mouth/Throat: Uvula is midline, oropharynx is clear and moist and mucous membranes are normal. No trismus in the jaw. Normal dentition. No uvula swelling. No posterior oropharyngeal erythema.   Eyes: Conjunctivae, EOM and lids are normal. Pupils are equal, round, and reactive to light. No scleral icterus.   Neck: Trachea normal and phonation normal. Neck supple. No neck rigidity present.   Cardiovascular: Normal rate, regular rhythm, normal heart sounds and normal pulses.   Pulmonary/Chest: Effort normal and breath sounds normal. No respiratory distress.   Abdominal: Normal appearance and bowel sounds are normal. He exhibits no distension. Soft. There is no abdominal tenderness.   Musculoskeletal: Normal range of motion.         General: No deformity. Normal range of motion.   Neurological: no focal deficit. He is alert and oriented to person, place, and time. No cranial nerve deficit. He exhibits normal muscle tone. Coordination normal.      Comments: Alert, oriented x 3. EOMI, PERRLA. Cranial nerves intact: facial expressions (smile, raising eyebrows, shutting eyes, pursed lips) symmetric. Shoulder shrug strength 5/5; sternocleidomastoid muscle strength  5/5 bilaterally. Jaw is midline without deviation. Tongue protrudes at midline without fasciculations.  Uvula rises at midline. Sensation to face in distribution of CN V1, V2, and V3 intact. Sensation to upper and lower extremities intact. Finger to nose, rapid rhythmic alternating movements are intact and smooth bilaterally. Patient ambulates unassisted without rigidity or ataxia. Romberg negative. Voice quality, comprehension, articulation, coherence assessed as appropriate.   Bilateral shoulders, elbows, wrists, knees exhibit full range of motion and 5/5 strength.   strength 5/5 bilaterally.   Skin: Skin is warm, dry, intact, not diaphoretic and not pale. Capillary refill takes less than 2 seconds. erythema   Psychiatric: His speech is normal and behavior is normal. Judgment and thought content normal.   Nursing note and vitals reviewed.          Orthostatics:  1st laying        2nd standing    3rd standing   BP: 109/65     BP: 92/53         BP: 110/68   P: 70              P: 81                 P: 80    Results for orders placed or performed in visit on 05/25/22   POCT Glucose, Hand-Held Device   Result Value Ref Range    POC Glucose 112 (A) 70 - 110 MG/DL     *Note: Due to a large number of results and/or encounters for the requested time period, some results have not been displayed. A complete set of results can be found in Results Review.         Assessment:       1. Orthostatic dizziness    2. Vertigo    3. Cellulitis of nose          Plan:         Orthostatic dizziness  -     Orthostatic vital signs  -     meclizine tablet 25 mg  -     POCT Glucose, Hand-Held Device    Vertigo  -     meclizine (ANTIVERT) 12.5 mg tablet; Take 1 tablet (12.5 mg total) by mouth 3 (three) times daily as needed for Dizziness.  Dispense: 30 tablet; Refill: 0    Cellulitis of nose  -     doxycycline (VIBRAMYCIN) 100 MG Cap; Take 1 capsule (100 mg total) by mouth every 12 (twelve) hours. for 5 days  Dispense: 10 capsule; Refill:  0    Patient is a 77-year-old male with medical history listed below presents with dizziness the past 2-3 days. No CP. Also presents with pain and redness to nose. Vitals reassuring. On exam, pt nontoxic and afebrile. Lungs CTAB. RRR. Neurologically intact without FNDs. Erythema, tenderness, swelling note to L of nasal dorsum. POCT glucose wnl. +orthostatics. Considered but low suspicion for cardiac etiology. Discussed with pt to increase fluids and change positions slowly. Pt also given meclizine in clinic with improvement in sxs. Lesion concerning for cellulitis vs malignancy. Will start on doxycycline for possible cellulitis. Recommend f/up with ENT. Strict ED precautions given. Pt verbalizes understanding and agrees with plan.             Patient Instructions   - Increase fluids  - Meclizine for symptoms of vertigo    - Follow up with your PCP or specialty clinic as directed in the next 1-2 weeks if not improved or as needed.  You can call [phone] to schedule an appointment with the appropriate provider.    - Go to the ER or seek medical attention immediately if you develop new or worsening symptoms.    - You must understand that you have received an Urgent Care treatment only and that you may be released before all of your medical problems are known or treated.   - You, the patient, will arrange for follow up care as instructed.   - If your condition worsens or fails to improve we recommend that you receive another evaluation at the ER immediately or contact your PCP to discuss your concerns or return here.

## 2022-05-25 NOTE — TELEPHONE ENCOUNTER
----- Message from Ashli Cheek sent at 5/25/2022  3:43 PM CDT -----  Type:  Sooner Appointment Request    Patient is requesting a sooner appointment.  Patient declined first available appointment listed as well as another facility and provider .  Patient will not accept being placed on the waitlist and is requesting a message be sent to doctor.    Name of Caller: Radha Torres/ caregiver     When is the first available appointment? 8/4     Symptoms: Spot on nose     Would the patient rather a call back or a response via [facility]? Call back     Best Call Back Number: [phone]

## 2022-05-25 NOTE — TELEPHONE ENCOUNTER
Spoke with pt's daughter informed her per provide the referring provider needs to send request to [facility]. Daughter verbalized understanding.

## 2022-05-25 NOTE — TELEPHONE ENCOUNTER
Called and spoke to Radha. She stated pt has a red, inflamed spot on his nose that is painful, and that this was the same way the spot on his neck popped up. The spot on the nose has been there for about 3 days now. Scheduled pt 5/27

## 2022-05-25 NOTE — TELEPHONE ENCOUNTER
----- Message from Aaliyah Montgomery sent at 5/25/2022 12:05 PM CDT -----      Name of Who is Calling: JACKIE MONTGOMERY JR. [[id number]]      What is the request in detail: Pt wife called to speak with the office regarding pt.Please contact to further discuss and advise.          Can the clinic reply by [facility]: N      What [facility]: [phone]

## 2022-05-25 NOTE — TELEPHONE ENCOUNTER
"----- Message from Erlinda Ambriz sent at 5/25/2022 10:26 AM CDT -----  Regarding: Bri "wife" : .[phone]  .Type: Patient Call Back    Who called Bri "wife"    What is the request in detail: requesting to get authorization to be sent to [facility] for radiation treatment to be approved for him to get it done at     Can the clinic reply by [facility]? Call back     Would the patient rather a call back or a response via [facility]?  Call back     Best call back number: .[phone]          "

## 2022-05-25 NOTE — PATIENT INSTRUCTIONS
- Increase fluids  - Meclizine for symptoms of vertigo    - Follow up with your PCP or specialty clinic as directed in the next 1-2 weeks if not improved or as needed.  You can call [phone] to schedule an appointment with the appropriate provider.    - Go to the ER or seek medical attention immediately if you develop new or worsening symptoms.    - You must understand that you have received an Urgent Care treatment only and that you may be released before all of your medical problems are known or treated.   - You, the patient, will arrange for follow up care as instructed.   - If your condition worsens or fails to improve we recommend that you receive another evaluation at the ER immediately or contact your PCP to discuss your concerns or return here.

## 2022-05-27 NOTE — PROGRESS NOTES
OTOLARYNGOLOGY CLINIC NOTE  Date:  05/27/2022     Chief complaint:  Chief Complaint   Patient presents with    Other     Spot on nose        History of Present Illness  Janusz Montgomery Jr. is a 77 y.o. male  presenting today for a followup - scheduled for evaluation of nose and dizziness. He is s/p neck dissection 5-4-22.    Nose feeling better , no lesion any further  On doxycycline and nose has gotten better.    Went to urgent care for nose  Has had some dizziness, no room spinning. Happens when stands from sitting or prolonged walking  Felt crusting and dry and some mild bleeding from left nose      Past Medical History  Past Medical History:   Diagnosis Date    (HFpEF) heart failure with preserved ejection fraction 2/4/2022    NAT (acute kidney injury) 3/19/2017    ALLERGIC RHINITIS     Anemia     Anxiety     Basal cell carcinoma 10/19/2018    forehead and right medial shoulder    Basal cell carcinoma 01/09/2019    left nasal bridge and left posterior ear    Basal cell carcinoma 06/12/2020    left lower medial leg - Efudex    Basal cell carcinoma 11/30/2020    left medial upper eyelid canthus superficial BCC    Chronic rhinitis 5/3/2013    Chronic rhinitis 5/3/2013    Coronary artery disease involving native coronary artery of native heart without angina pectoris s/p RCA stent     Cortical cataract of both eyes 3/18/2016    Delayed sleep phase syndrome 3/13/2019    Depression     Erectile dysfunction 3/24/2014    Erectile dysfunction 3/24/2014    Essential hypertension     GERD (gastroesophageal reflux disease) 7/25/2012    Gout, arthritis     Grade III open fracture of left tibia and fibula s/p ex-fix on 7/1/16 and ORIF of left tibia on 7/15 7/6/2016    H/O: iritis     Helicobacter pylori (H. pylori) infection     Treated    Hepatitis     Herpes simplex keratoconjunctivitis 9/30/2015    - on acyclovir - followed by opthalmology, Dr. Uribe     Herpes simplex keratoconjunctivitis  "9/30/2015    - on acyclovir - followed by opthalmology, Dr. Uribe     Hyperkalemia 2/28/2017    Hyperlipidemia     Hypogonadism male     Hypogonadism male     Keloid cicatrix     Mixed anxiety and depressive disorder     Morbid obesity     Obstructive sleep apnea on CPAP     CPAP    Osteoarthritis of left knee 7/25/2012    Paroxysmal atrial fibrillation 7/6/2016    Primary osteoarthritis of left knee 7/25/2012    Prominent aorta 1/25/2016    "RESULTS: THE HEART IS MILDLY ENLARGED WITH A SLIGHTLY PROMINENT AORTA" - Xray Chest PA & Lateral 12-     Prostate cancer 2/15/2016    - followed by urology, Dr. Young     Prostate cancer 2/15/2016    - followed by urology, Dr. 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    chest and forehead    Squamous cell cancer of skin of nose     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 7/6/2016    Type III open fracture of left tibia and fibula with routine healing 7/6/2016    Vitamin D deficiency disease     Vitreous detachment 3/18/2016        Past Surgical History  Past Surgical History:   Procedure Laterality Date    ADJACENT TISSUE TRANSFER Left 11/30/2020    Procedure: ADJACENT TISSUE TRANSFER x2 ;  Surgeon: Teresa Cooper MD;  [facility];  Service: Ophthalmology;  Laterality: Left;  Left glabellar 8.5x11x3 and Left upper eyelid advancement flap [id number]      Cardiac stenting x2      CATARACT EXTRACTION W/  INTRAOCULAR LENS IMPLANT Right 3/29/2016    Dr. Conteh    CATARACT EXTRACTION W/  INTRAOCULAR LENS IMPLANT Left 4/12/2016        COLONOSCOPY N/A 7/23/2020    Procedure: COLONOSCOPY;  Surgeon: Nico Lackey MD;  Location: [facility] ENDO (2ND FLR);  " "Service: Endoscopy;  Laterality: N/A;    DIRECT LARYNGOSCOPY N/A 5/4/2022    Procedure: LARYNGOSCOPY, DIRECT possible biopsy;  Surgeon: Etta Higgins MD;  Location: [facility] OR;  Service: ENT;  Laterality: N/A;  RN PREOP ON 4/29/22. NEEDS T&S DAY OF SURGERY--NF  CONSENTS DAY OF SURGERY    DISSECTION OF NECK Right 5/4/2022    Procedure: RADICAL NECK DISSECTION;  Surgeon: Etta Higgins MD;  Location: [facility] OR;  Service: ENT;  Laterality: Right;  TO FOLLOW DR ALMAZAN    ESOPHAGOGASTRODUODENOSCOPY N/A 7/23/2020    Procedure: EGD (ESOPHAGOGASTRODUODENOSCOPY);  Surgeon: Nico Lackey MD;  Location: [facility] ([address] [place] [address]);  Service: Endoscopy;  Laterality: N/A;  per Dr Lackey-Will proceed with EGD and colonoscopy on the 2nd floor due to his comorbidities including obesity, sleep apnea, restrictive lung disease, coronary artery disease.  has loop recorder      ok to hold Eliquis 2 days per Dr Sandhu-must remain    EXTERNAL FIXATION TIBIAL FRACTURE Left 07/01/2016    INSERTION OF IMPLANTABLE LOOP RECORDER  04/07/2017    LEFT HEART CATHETERIZATION Left 1/30/2020    Procedure: Left heart cath;  Surgeon: Benjamin Sandhu MD;  Location: [facility] CATH LAB;  Service: Cardiology;  Laterality: Left;  RN PREOP 1/28/20--Pt starting Plavix loading dose today (8pills)- Dr. Sandhu aware.  Pt has a bandaged "non healing area to LLE"--Dr. Sandhu aware.    LEFT HEART CATHETERIZATION Left 2/14/2020    Procedure: Left heart cath, IVUS guided left main / LAD PCI. Noon start, radial approach;  Surgeon: Miguel Angel Brady MD;  Location: [facility] CATH LAB;  Service: Cardiology;  Laterality: Left;  RN PRE OP 2-7-2020  BMI--45.61    ORIF TIBIA FRACTURE Left 07/15/2016    PROBING OF NASOLACRIMAL DUCT WITH INSERTION OF TUBE Left 11/30/2020    Procedure: PROBING, NASOLACRIMAL DUCT,;  Surgeon: Teresa Cooper MD;  [address];  Service: Ophthalmology;  Laterality: Left;    RADIOACTIVE SEED IMPLANTATION OF PROSTATE N/A 8/8/2018    " Procedure: INSERTION, RADIOACTIVE SEED, PROSTATE;  Surgeon: Bipin Thompson MD;  Location: [address];  Service: Urology;  Laterality: N/A;  1 hour    SKIN BIOPSY      Squamous cell cancer removal x3 with Mohs surgery      TONSILLECTOMY      TOTAL KNEE ARTHROPLASTY  10/2012    TRIGGER FINGER RELEASE Right 10/14/2020    Procedure: RELEASE, TRIGGER FINGER - right;  Surgeon: Rad Swift MD;  Location: [facility] OR;  Service: Orthopedics;  Laterality: Right;    trus/bx      ULTRASOUND GUIDANCE  2/14/2020    Procedure: Ultrasound Guidance;  Surgeon: Miguel Angel Brady MD;  Location: [facility] CATH LAB;  Service: Cardiology;;        Medications  Current Outpatient Medications on File Prior to Visit   Medication Sig Dispense Refill    albuterol-ipratropium (DUO-NEB) 2.5 mg-0.5 mg/3 mL nebulizer solution Take 3 mLs by nebulization every 6 (six) hours as needed for Wheezing. Rescue 75 mL 0    amiodarone (PACERONE) 200 MG Tab Take 1 tablet (200 mg total) by mouth 2 (two) times daily. 60 tablet 11    artificial tears (ISOPTO TEARS) 0.5 % ophthalmic solution Place 1 drop into both eyes as needed.      atorvastatin (LIPITOR) 40 MG tablet Take 1 tablet (40 mg total) by mouth once daily. (Patient taking differently: Take 40 mg by mouth.) 90 tablet 1    clopidogreL (PLAVIX) 75 mg tablet Take 1 tablet by mouth once daily 90 tablet 3    doxycycline (VIBRAMYCIN) 100 MG Cap Take 1 capsule (100 mg total) by mouth every 12 (twelve) hours. for 5 days 10 capsule 0    ELIQUIS 5 mg Tab Take 1 tablet by mouth twice daily 180 tablet 3    fluticasone propionate (FLONASE) 50 mcg/actuation nasal spray 1 spray (50 mcg total) by Each Nostril route once daily. 1 Bottle 0    furosemide (LASIX) 20 MG tablet Take 1 tablet (20 mg total) by mouth once daily. 90 tablet 3    gabapentin (NEURONTIN) 300 MG capsule Take 1 capsule (300 mg total) by mouth 3 (three) times daily. 270 capsule 1    meclizine (ANTIVERT) 12.5 mg tablet Take 1 tablet  (12.5 mg total) by mouth 3 (three) times daily as needed for Dizziness. 30 tablet 0    melatonin 5 mg Tab Take 10 mg by mouth nightly.      metoprolol succinate (TOPROL-XL) 25 MG 24 hr tablet Take 1 tablet by mouth once daily 90 tablet 3    nitroGLYCERIN (NITROSTAT) 0.4 MG SL tablet Place 1 tablet (0.4 mg total) under the tongue every 5 (five) minutes as needed for Chest pain. 25 tablet 11    olmesartan (BENICAR) 5 MG Tab Take 1 tablet (5 mg total) by mouth once daily. 90 tablet 3    oxybutynin (DITROPAN-XL) 5 MG TR24 Take 1 tablet (5 mg total) by mouth once daily. 30 tablet 11    oxyCODONE-acetaminophen (PERCOCET) 5-325 mg per tablet Take 2 tablets by mouth every 6 (six) hours as needed for Pain. 25 tablet 0    pantoprazole (PROTONIX) 40 MG tablet Take 1 tablet (40 mg total) by mouth once daily. 30 tablet 11    sildenafiL (VIAGRA) 100 MG tablet Take 1 tablet (100 mg total) by mouth daily as needed. 10 tablet 11    tamsulosin (FLOMAX) 0.4 mg Cap Take 1 capsule (0.4 mg total) by mouth once daily. 90 capsule 3    traZODone (DESYREL) 50 MG tablet Take 1 tablet (50 mg total) by mouth nightly as needed for Insomnia. 30 tablet 2    venlafaxine (EFFEXOR) 75 MG tablet Take 2 tablets (150 mg total) by mouth 2 (two) times daily. (Patient taking differently: Take 75 mg by mouth 2 (two) times daily.) 360 tablet 0    vitamin D 1000 units Tab Take 1 tablet (1,000 Units total) by mouth once daily. (Patient taking differently: Take 2,000 Units by mouth once daily.)      [DISCONTINUED] albuterol (VENTOLIN HFA) 90 mcg/actuation inhaler Inhale 2 puffs into the lungs every 6 (six) hours as needed for Wheezing. Rescue 6.7 g 0    [DISCONTINUED] omeprazole (PRILOSEC) 20 MG capsule Take 1 capsule (20 mg total) by mouth daily as needed. 90 capsule 0     No current facility-administered medications on file prior to visit.       Review of Systems  Review of Systems   Constitutional: Negative for fever.   HENT: Positive for  "hearing loss (chronic) and nosebleeds.    Respiratory: Negative for hemoptysis.    Cardiovascular: Positive for chest pain (muscular).   Musculoskeletal: Negative for falls.   Skin: Negative for rash.   Neurological: Positive for dizziness.        Social History   reports that he has never smoked. He has never used smokeless tobacco. He reports current alcohol use of about 2.0 standard drinks of alcohol per week. He reports that he does not use drugs.     Family History  Family History   Problem Relation Age of Onset    Skin cancer Father     Lung cancer Father     Cancer Father         smoker,     Alzheimer's disease Mother     Hypertension Mother     Cancer Sister         colon, lung cancer     No Known Problems Sister     Cancer Brother         skin cancer, polypectomy     Peripheral vascular disease Unknown     No Known Problems Maternal Aunt     No Known Problems Maternal Uncle     No Known Problems Paternal Aunt     No Known Problems Paternal Uncle     No Known Problems Maternal Grandmother     No Known Problems Maternal Grandfather     No Known Problems Paternal Grandmother     No Known Problems Paternal Grandfather     Melanoma Neg Hx     Psoriasis Neg Hx     Lupus Neg Hx     Eczema Neg Hx     Amblyopia Neg Hx     Blindness Neg Hx     Cataracts Neg Hx     Diabetes Neg Hx     Glaucoma Neg Hx     Macular degeneration Neg Hx     Retinal detachment Neg Hx     Strabismus Neg Hx     Stroke Neg Hx     Thyroid disease Neg Hx     Acne Neg Hx         Physical Exam   There were no vitals filed for this visit. Body mass index is 44.2 kg/m².  Weight: (!) 151.9 kg (334 lb 15.8 oz)   Height: 6' 1" (185.4 cm)     GENERAL: no acute distress.  HEAD: normocephalic.   EYES: lids and lashes normal. No scleral icterus  NOSE: external nose without significant bony abnormality; well healed scar, do not see any lesion or swelling; crusting caudal left septum and in mid nasal cavity ; wearing nasal " cannula  ORAL CAVITY/OROPHARYNX: tongue  mobile.   NECK: trachea midline. Incision c/d/i, healing well  LYMPH NODES:No cervical lymphadenopathy.  RESPIRATORY: no stridor, no stertor. Voice normal. Respirations nonlabored.  NEURO: alert, responds to questions appropriately.  Facial movement symmetric at rest   PSYCH:mood appropriate      Imaging:  The patient does not have any new imaging of the head and neck since last visit.     Labs:  CBC  Recent Labs   Lab 05/14/22  0226 05/15/22  0415 05/16/22  0509   WBC 10.91 11.40 13.07 H   Hemoglobin 10.3 L 10.7 L 10.8 L   Hematocrit 31.2 L 32.7 L 32.4 L    H 104 H 103 H   Platelets 220 212 221     BMP  Recent Labs   Lab 06/22/20  1306 08/17/20  1448 05/14/22  0226 05/15/22  0415 05/16/22  0509   Glucose 95   < > 115 H 94 99   Sodium 139   < > 139 140 138   Potassium 5.2 H   < > 4.4 4.5 3.9   Chloride 107   < > 105 105 101   CO2 26   < > 26 26 29   BUN 32 H   < > 25 H 24 H 26 H   Creatinine 1.6 H   < > 1.4 1.4 1.4   Calcium 9.1   < > 8.4 L 8.9 9.1   Phosphorus  --    < > 2.4 L 3.4 3.0   Magnesium  --    < > 1.9 1.8 1.7   Prealbumin 18 L  --   --   --   --     < > = values in this interval not displayed.     COAGS  Recent Labs   Lab 01/28/20  1508   INR 1.0       Assessment  1. Nasal crusting    2. Shoulder weakness  - Ambulatory referral/consult to Physical/Occupational Therapy; Future    3. Head and neck cancer    4. CHARISMA on CPAP    5. Dizziness       Plan:  Discussed plan of care with patient in detail and all questions answered. Patient reported understanding of plan of care.     Daughter Will message with info about where to send letter so can get rads at [facility]. [place] to go [place] due to multifactorial social issues/transportation problems  Pet requested by dr. Perez ordered  Have discussed case with dr perez.    Saline and bactroban for nasal crusting avoid digital manipulation    Has pulm f/u 6-8 and seeing derm in 2 weeks; external nose looks ok to me  today but notify me or derm if issue recurs on outside after abx completed. If inside of nose with problem notify me and will do scope but suspected source of issue from internal complaint seen on anterior rhinoscopy today     Needs to wear pressure stockings. Recommend against meclizine. Has chronic anemia which could be contributing as well . Deconditioned. Physical therapy order placed for deconditioning and for shoulder exercises    Ensure for nutrition optimization    F/u 6 weeks post treatment and any time between now and then for any acute issues.    I spent a total of 30 minutes on the day of the visit.  This includes face to face time (over 50% of listed time) and non-face to face time preparing to see the patient (eg, review of tests), obtaining and/or reviewing separately obtained history, documenting clinical information in the electronic or other health record, independently interpreting results and communicating results to the patient/family/caregiver, or care coordinator.    Please be aware that this note has been generated with the assistance of MModal voice-to-text.  Please excuse any spelling or grammatical errors.

## 2022-05-27 NOTE — PATIENT INSTRUCTIONS
Information and instructions from your visit with me today:      Start nasal irrigations with saline solution- you can either use a rinse or a mist spray:    NASAL SALINE SPRAY ( simply saline and arm and hammer are examples) There are several different brands found in the cold and flu aisle of the pharmacy. You can use any brand of saline spray - this will deliver the saline by a gentle mist ( if you have difficulty or discomfort with nasal rinse/ a lot of fluid in the nose, this will be more comfortable).       Always rinse your nose with saline prior to using medication sprays and wait a couple of hours before using again. You can use the saline throughout the day to help with stuffy nose or dry nose.    Do not use nasal decongestant sprays such as Afrin or similar products long term ( over 3 days) .  This can cause long term physical nasal addiction. Afrin should only be used if having nose bleeds, severe nasal congestion , or severe ear pain/fullness and should not be used for more than 2-3 days in a row . It is a not a medication that should be used for a long period of time.     It was nice meeting you today, and I look forward to helping you feel better soon. Please don't hesitate to call if you have any other questions or concerns, or if I can be of any assistance in the meantime.      Etta Higgins MD    [facility]     Phone  [phone]    Fax      [phone]        Etta Higgins MD  Otorhinolaryngology

## 2022-05-30 NOTE — TELEPHONE ENCOUNTER
----- Message from Bryce Serna sent at 5/30/2022 10:38 AM CDT -----  Type: Patient Call Back    Who called: daughter-     What is the request in detail:Pt's daughter states that the doctor put in an order for outpatient PT, however the pt needs home physical therapy. She would like to know if another order can be placed.Please call    Can the clinic reply by [facility]?no    Would the patient rather a call back or a response via [facility]? call    Best call back number:[phone] (home) [phone] (work)

## 2022-05-30 NOTE — TELEPHONE ENCOUNTER
Spoke with Radha in regards to placing a home health order for PT. Order has been for home health. Letter has been faxed to [facility] concerning treat with Dr Moser's office.

## 2022-06-01 NOTE — TELEPHONE ENCOUNTER
Spoke with Radha (stepdaughter) concerning rescheduling patient's PET for a sooner day was able to get him scheduled for 6/3/2022. Also faxed a revised letter to [employer] at [phone]

## 2022-06-01 NOTE — TELEPHONE ENCOUNTER
----- Message from Jamila Kay sent at 6/1/2022 12:26 PM CDT -----  Regarding: call back from Ms. Benson in regards to PT scan for patient  Type: Patient Call Back    Who called:Radha     What is the request in detail: be Garcia of the patient is requesting a call back from Ms. eBnson. She would like to discuss rescheduling the patient's Pt scan. Please return call at earliest convenience.    Can the clinic reply by [facility]?no    Would the patient rather a call back or a response via [facility]? Call back    Best call back number:[phone]

## 2022-06-03 NOTE — TELEPHONE ENCOUNTER
Spoke with ms rjdelmer is stating that the letter they received states [facility]. Explained to her that it does not state that and read the letter to her. She was gonna call dr perez office

## 2022-06-03 NOTE — TELEPHONE ENCOUNTER
----- Message from Charli Calwdell sent at 6/3/2022  2:45 PM CDT -----  Type: Patient Call Back    Who called:Self    What is the request in detail: Requesting a call back from [facility]    Can the clinic reply by [facility]? no    Would the patient rather a call back or a response via [facility]? Call back    Best call back number: [phone] (work)

## 2022-06-06 NOTE — TELEPHONE ENCOUNTER
"----- Message from Erlinda Ambriz sent at 6/6/2022 10:55 AM CDT -----  Regarding: Radha "daughter"  .Type: Patient Call Back    Who called: Radha "daughter"    What is the request in detail: Requesting results fro PET scan     Can the clinic reply by [facility]? Call back     Would the patient rather a call back or a response via [facility]? Call back     Best call back number: .[phone]          "

## 2022-06-06 NOTE — TELEPHONE ENCOUNTER
----- Message from Kip Foss sent at 6/6/2022 12:55 PM CDT -----  Type:  Needs Medical Advice    Who Called: daughter  Reason:starting home PT   Would the patient rather a call back or a response via [facility]? call  Best Call Back Number: [phone]  Additional Information:none

## 2022-06-06 NOTE — TELEPHONE ENCOUNTER
There was no distant mets noted. There was a little inflammation in his colon - has he had issues with bowel recently? I do not think it was cancer or anything like that though. Have them make sure they get a copy of the images on a disc for dr perez to review also. The report should also go to the [facility]. Please let me know if they have specific questions about the report but I did not anything on my read. He does have some activity in the neck but likely from surgery but that is also why I want him to get the radiation because we know that the cancer was outside of the lymph node. thanks

## 2022-06-06 NOTE — TELEPHONE ENCOUNTER
Spoke with Radha patient's stepdaughter in regards to getting a copy of the patient's PET scan imaging for Dr Moser. She also was informing she is still waiting on PT to call her back.

## 2022-06-06 NOTE — PROGRESS NOTES
Subjective:       Patient ID: Janusz Montgomery Jr. is a 77 y.o. white male who presents for follow-up evaluation of   No chief complaint on file.      HPI     Patient seen by me March 2021.  Last seen by Dr. Moctezuma in December 2017; lost to follow up since then. He was followed by Dr. 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 Moctezuma.       Patient presents today with wife for f/u of CKD.  Baseline creatinine of 1.5-1.6 mg/dL since Feb 2020; prior to that, it was 1.01-1.2 mg/dL.  Appears that patient had NAT in Feb 2020 that never returned to baseline. Elevated sCr initially noted on 2/11; patient had cardiac cath on 2/14 and was stented.      Has many questions about low K diet today.  Home SBPs on digital HTN: 150s.     Significant hx includes HLD, CAD s angina, HTN, skin ulcer, BCC, sepsis, AKIs, afib     The patient denies taking NSAIDs; no recent hospitaliztions.     Significant family hx includes: alzheimer's, HTN, skin ca, lung ca, PVD. No known renal disease.     Last renal US: 8/2020, reviewed.    Update 12/13/21:  Last seen by me June 2021.  Returns for f/u of CKD.  Baseline sCr: 1.3-1.5 mg/dL.  Home BPs: 130-150/50-60s on digital monitoring.  Was on Bactrim in September.   Has many questions about diet.  Says he has had excessive thirst his whole life.    Update 6/6/22:  Returns for f/u of CKD. Presents with step daughter,  Radha. Wife is on the phone.  Olmesartan restarted at last visit at 5 mg.    Baseline sCr appears to be 1.4-1.5 with occasional increases to 1.8-1.9. Most recent sCr was 1.9.  Home BPs: Mostly 100s-130s/40-60s on digital monitoring prior to taking medication, but he has notably had SBP 90s- low 100s at recent appointments    Had radical neck dissection on 5/4/22 - 5/11/22 for lymphadenopathy/ head and neck cancer thought to be 2/2 previous skin cancer. Had issues with breathing which required prolonged hospital stay. Planning for chemoradiation; being seen by  "Todd Clarke MD, Dr. Moser, but has not started yet. Had PET scan on Friday.   They have had many questions about cardiac and renal diet during and since admission for surgery.    He was admitted with afib RVR on 5/13/22 at [facility]. HR was 130-170s c hypotension.  Hospital f/u c Dr. Pop was 5/18. Per her note, he was started on amiodarone.    5/25/22 - seen at urgent care with orthostatic dizziness. Says he had not been drinking as much fluid the few days prior to presentation. Encouraged to increase PO fluid intake and take meclizine. Also was given doxycycline for rash to nose.      Review of Systems   Respiratory: Positive for shortness of breath.    Cardiovascular: Positive for leg swelling (occassionally).   Gastrointestinal: Negative for diarrhea, nausea and vomiting.   Genitourinary: Negative for difficulty urinating, dysuria and hematuria.   Neurological: Positive for dizziness and light-headedness.       Objective:       Blood pressure (!) 110/50, pulse 60, height 6' 1" (1.854 m), weight (!) 151.2 kg (333 lb 5.4 oz), SpO2 95 %.  Physical Exam  Constitutional:       Appearance: Normal appearance. He is obese.   Cardiovascular:      Rate and Rhythm: Normal rate and regular rhythm.      Heart sounds: No murmur heard.    No gallop.   Pulmonary:      Effort: Pulmonary effort is normal. No respiratory distress.      Breath sounds: Normal breath sounds. No wheezing or rales.   Abdominal:      Tenderness: There is no right CVA tenderness or left CVA tenderness.   Musculoskeletal:      Right lower leg: No edema.      Left lower leg: No edema.   Neurological:      Mental Status: He is alert.   Psychiatric:         Mood and Affect: Mood normal.         Behavior: Behavior normal.         Thought Content: Thought content normal.         Judgment: Judgment normal.           Lab Results   Component Value Date    CREATININE 1.9 (H) 06/02/2022     Prot/Creat Ratio, Urine   Date Value Ref Range Status "   06/02/2022 0.07 0.00 - 0.20 Final   12/01/2021 0.15 0.00 - 0.20 Final   06/11/2021 0.20 0.00 - 0.20 Final     Lab Results   Component Value Date     06/02/2022    K 4.3 06/02/2022    CO2 27 06/02/2022     06/02/2022     Lab Results   Component Value Date    PTH 83.9 (H) 06/02/2022    CALCIUM 9.5 06/02/2022    PHOS 2.9 06/02/2022     Lab Results   Component Value Date    HGB 11.0 (L) 06/02/2022    WBC 10.59 06/02/2022    HCT 34.0 (L) 06/02/2022      Lab Results   Component Value Date    HGBA1C 5.6 02/11/2020     06/02/2022    BUN 27 (H) 06/02/2022     Lab Results   Component Value Date    LDLCALC 96.0 01/10/2022         Assessment:       1. Stage 3b chronic kidney disease    2. NAT (acute kidney injury)    3. Essential hypertension    4. Hyperparathyroidism    5. Orthostatic dizziness    6. Edema, unspecified type    7. History of hyperkalemia    8. Morbid obesity with BMI of 40.0-44.9, adult        Plan:   CKD stage 3B c eGFR 42-49 mL/min c superimposed NAT - likely 2/2 HTN nephrosclerosis and atherosclerotic disease, and age-related nephron loss. Recently with NAT possibly due to renal hypoperfusion (has been having orthostatic symptoms p starting amiodarone). Moderate risk of progression to ESRD.    Many questions about diet - will refer back to dietician.    UPCR Non-proteinuric. Has been on ARB but stopping today due to hypotension and NAT.   Acid-base WNL.   Renal osteodystrophy Ca okay; phos okay. PTH improved recently. On vit D daily.   Anemia Hgb at goal for CKD.   DM N/a.   Lipid Management On statin per PCP.   ESRD planning Anticipatory guidance provided about timing of dialysis. Start discussions and planning when eGFR is about 20 mL/min; most patients start dialysis between 5-10 mL/min.      HTN - Okay before medication but seems to drop low after medication.  on amiodarone 200 mg BID, furosemide 20 mg, metoprolol succinate 25 mg  - Will d/c olmesartan 5 mg due to NAT and  hypotension. Has been having orthostatic dizziness since starting amiodarone    H/o Hyperkalemia - WNL recently. Has been on low K diet.    Edema - okay today. continue lasix 20 mg daily     Obesity - he has been working on weight loss     All questions patient had were answered.  Asked if further questions. None. F/u in clinic in 3 mos  with labs and urine prior to next visit or sooner if needed.  ER for emergency concerns.     Melvin pt; enrolled March 2021. (lost card)    Summary of Plan:  1. Stop olmesartan due to hypotension/NAT  2. Refer back to dietician  3. BMP on Friday  4. Spot check labs in 6 weeks  5. RTC in 3 mos    75 minutes of total time spent on the encounter, which includes face to face time and non-face to face time preparing to see the patient (eg, review of tests), Obtaining and/or reviewing separately obtained history, documenting clinical information in the electronic or other health record, independently interpreting results (not separately reported) and communicating results to the patient/family/caregiver, or Care coordination (not separately reported).                    normal for race

## 2022-06-07 NOTE — TELEPHONE ENCOUNTER
----- Message from Norma Riley sent at 6/7/2022 10:06 AM CDT -----  Type: Patient Call Back  Anthony     Who called: Radha Floreznes, step-daughter     What is the request in detail: Asking for a call back about Pt testing (PET scan ) asap     Can the clinic reply by [facility]?    Would the patient rather a call back or a response via [facility]? Call     Best call back number:  [phone]

## 2022-06-07 NOTE — TELEPHONE ENCOUNTER
Spoke with Ms. Radha, her mom Bri would like to speak with you in regards of her  Mr. Boudreaux PET Scan that was done. Please call Radha phone at [phone]  Thanks

## 2022-06-07 NOTE — TELEPHONE ENCOUNTER
Received call from dr. Moser assistant, she was inauiring if patient has upcoming appointment for has been seen by our office. I advised no and no referral sent to our office. She stated per dr. Moser patient needed to be seen for head and neck cancer and to contact pt daughter, rj chaparro, for scheduling. Reached out to dr. arboleda office to ask to please place referral. Referral was placed and patient information given to nurse navigator to schedule patient.

## 2022-06-07 NOTE — TELEPHONE ENCOUNTER
I sent this yesterday to rosita  Not sure if rosita got message or called? If she called and patient still wants me to call let me know . I am confused. In my note to rosita I said the following:    There was no distant mets noted. There was a little inflammation in his colon - has he had issues with bowel recently? I do not think it was cancer or anything like that though. Have them make sure they get a copy of the images on a disc for dr perez to review also. The report should also go to the [facility]. Please let me know if they have specific questions about the report but I did not anything on my read. He does have some activity in the neck but likely from surgery but that is also why I want him to get the radiation because we know that the cancer was outside of the lymph node. thanks

## 2022-06-08 PROBLEM — H90.3 SENSORINEURAL HEARING LOSS (SNHL) OF BOTH EARS: Status: ACTIVE | Noted: 2022-01-01

## 2022-06-08 PROBLEM — N18.31 STAGE 3A CHRONIC KIDNEY DISEASE: Status: ACTIVE | Noted: 2020-08-24

## 2022-06-08 PROBLEM — C77.0 SECONDARY MALIGNANT NEOPLASM OF CERVICAL LYMPH NODE: Status: ACTIVE | Noted: 2022-01-01

## 2022-06-08 PROBLEM — C44.82 SQUAMOUS CELL CARCINOMA OF OVERLAPPING SITES OF SKIN: Status: ACTIVE | Noted: 2022-01-01

## 2022-06-08 NOTE — ASSESSMENT & PLAN NOTE
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  Reviewed with family that cutaneous origin is thought to be less likely with his p16 positivity.  Nonetheless, even if this were a cutaneous metastatic site, recommendations for adjuvant chemoradiation for MOHAN would still be the same.  -not a candidate for cisplatin with his renal dysfunction  -consented for carboplatin  --tentative start date 6/20/22.  Baseline labs 6/17/22  -will need port; referral to IR for port placement. He is on plavix and apixaban as anticoagulants.  -labs on Fridays weekly at the [facility]  -Follow up with me during 2nd or 3rd week.

## 2022-06-08 NOTE — ASSESSMENT & PLAN NOTE
Reviewed risk of treatment with carboplatin including potential risk of worsening his hearing loss  Reviewed with him that if he feels his hearing aids are not working as well after chemotherapy, we will need to repeat his audiogram

## 2022-06-08 NOTE — Clinical Note
Patient is father of Bri who is a  for interventional radiology on [facility] Chemo needs expedited auth and scheduling when approved.  Start date 6/20 Labs needed: port draw labs cmp, cbc, magnesium starting 6/17 and then weekly thereafter on fridays for 7 weeks total. Chemotherapy Regimen:  Next Treatment Date Upcoming Treatment Dates - OP CARBOPLATIN WEEKLY 6/20/2022      CARBOplatin (PARAPLATIN) in sodium chloride 0.9% 250 mL chemo infusion 6/27/2022      CARBOplatin (PARAPLATIN) in sodium chloride 0.9% 250 mL chemo infusion 7/4/2022      CARBOplatin (PARAPLATIN) in sodium chloride 0.9% 250 mL chemo infusion  Provider: Beena

## 2022-06-08 NOTE — PLAN OF CARE
START ON PATHWAY REGIMEN - Head and Neck    [id number]        Carboplatin           Additional Orders: Chemotherapy given concurrently with radiation.    **Always confirm dose/schedule in your pharmacy ordering system**    Patient Characteristics:  Oropharynx, HPV Positive, Postoperative without Neoadjuvant Therapy (Pathologic   Staging), pT0-4, pN1-2 or pT3-4, pN0, MOHAN or Positive Margins  Disease Classification: Oropharynx  HPV Status: Positive (+)  Therapeutic Status: Postoperative without Neoadjuvant Therapy (Pathologic   Staging)  AJCC T Category: pT0  AJCC 8 Stage Grouping: II  AJCC N Category: pN2  AJCC M Category: cM0  Extranodal Extension and Margin Status: MOHAN or Positive Margins  Intent of Therapy:  Curative Intent, Discussed with Patient
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
## 2022-06-08 NOTE — ASSESSMENT & PLAN NOTE
No wheezing on exam. Requires supplemental oxygen at baseline  -continue supplemental O2 and inhalers

## 2022-06-08 NOTE — ASSESSMENT & PLAN NOTE
-Patient declined ER referral during clinic visitn.  EKG confirmed afib and rvr with rate 130s.  I contacted patient over the phone with EKG result.  Patient stated that he is feeling better since being home.  History suggestive uncontrol PAF.  Recommend for patient to make appointment with cardiology, Dr. Sandhu, asap.  Patient expressed understanding.

## 2022-06-08 NOTE — PROGRESS NOTES
PATIENT: Janusz Montgomery Jr.  MRN: [id number]  DATE: 6/8/2022      Diagnosis:   1. Head and neck cancer    2. Secondary malignant neoplasm of cervical lymph node    3. Basal cell carcinoma (BCC) of overlapping sites of skin    4. Squamous cell carcinoma of overlapping sites of skin    5. Stage 3a chronic kidney disease    6. Chronic respiratory failure with hypoxia    7. Personal history of asbestosis    8. Sensorineural hearing loss (SNHL) of both ears        Chief Complaint: Consult      Oncologic History:    Oncologic History 1. Cutaneous squamous cell carcinoma  2. Cutaneous basal cell carcinoma  3. 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 Multiple shave biopsies  3. 5/5/22 radical neck dissection    Pathology         Subjective:    Interval History: Mr. Montgomery is here for new patient consultation.    77 year old man with multiple co-morbidities is here for new diagnosis of squamous cell carcinoma of the right neck.  3/31/22 presented to ENT with painful right neck swelling. 4/12/22 CT neck showed multiple enlarged level 2 lymph nodes.  4/13/22 FNA revealed squamous cell carcinoma.  Case reviewed at head neck tumor board with recommendations for DL and neck dissection.  5/5/22 underwent DL (did not see other lesions) and right neck dissection, final pathology 7.5 cm lymph node, p16+ squamous cell carcinoma with MOHAN.  He was seen by Dr. Moser ([facility] radiation oncology) and referred here for evaluation for concurrent chemotherapy.    He is healing well post-operatively with residual post-surgical numbness in the area.  Denies any bleeding or discharge from the surgical incision sites.    He has baseline hearing deficit and uses hearing aids.  He reports difficult peripheral IV access (veins roll)  His family accompanies him at this visit.      Past Medical History:   Past Medical History:   Diagnosis Date    (HFpEF) heart failure with preserved ejection fraction  "2/4/2022    NAT (acute kidney injury) 3/19/2017    ALLERGIC RHINITIS     Anemia     Anxiety     Basal cell carcinoma 10/19/2018    forehead and right medial shoulder    Basal cell carcinoma 01/09/2019    left nasal bridge and left posterior ear    Basal cell carcinoma 06/12/2020    left lower medial leg - Efudex    Basal cell carcinoma 11/30/2020    left medial upper eyelid canthus superficial BCC    Chronic rhinitis 5/3/2013    Chronic rhinitis 5/3/2013    Coronary artery disease involving native coronary artery of native heart without angina pectoris s/p RCA stent     Cortical cataract of both eyes 3/18/2016    Delayed sleep phase syndrome 3/13/2019    Depression     Erectile dysfunction 3/24/2014    Erectile dysfunction 3/24/2014    Essential hypertension     GERD (gastroesophageal reflux disease) 7/25/2012    Gout, arthritis     Grade III open fracture of left tibia and fibula s/p ex-fix on 7/1/16 and ORIF of left tibia on 7/15 7/6/2016    H/O: iritis     Helicobacter pylori (H. pylori) infection     Treated    Hepatitis     Herpes simplex keratoconjunctivitis 9/30/2015    - on acyclovir - followed by opthalmology, Dr. Uribe     Herpes simplex keratoconjunctivitis 9/30/2015    - on acyclovir - followed by opthalmology, Dr. Uribe     Hyperkalemia 2/28/2017    Hyperlipidemia     Hypogonadism male     Hypogonadism male     Keloid cicatrix     Mixed anxiety and depressive disorder     Morbid obesity     Obstructive sleep apnea on CPAP     CPAP    Osteoarthritis of left knee 7/25/2012    Paroxysmal atrial fibrillation 7/6/2016    Primary osteoarthritis of left knee 7/25/2012    Prominent aorta 1/25/2016    "RESULTS: THE HEART IS MILDLY ENLARGED WITH A SLIGHTLY PROMINENT AORTA" - Xray Chest PA & Lateral 12-     Prostate cancer 2/15/2016    - followed by urology, Dr. Young     Prostate cancer 2/15/2016    - followed by urology, Dr. Young     PVD (peripheral " vascular disease)     Refractive error 3/18/2016    Respiratory failure, unspecified with hypoxia 2/4/2022    SCC (squamous cell carcinoma) 06/29/2021    R trap    Skin disease     Skin ulcer     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    chest and forehead    Squamous cell cancer of skin of nose     Traumatic type III open fracture of shaft of left tibia and fibula with nonunion 7/6/2016    Type III open fracture of left tibia and fibula with routine healing 7/6/2016    Vitamin D deficiency disease     Vitreous detachment 3/18/2016       Past Surgical HIstory:   Past Surgical History:   Procedure Laterality Date    ADJACENT TISSUE TRANSFER Left 11/30/2020    Procedure: ADJACENT TISSUE TRANSFER x2 ;  Surgeon: Teresa Cooper MD;  Location: [facility] OR 1ST FLR;  Service: Ophthalmology;  Laterality: Left;  Left glabellar 8.5x11x3 and Left upper eyelid advancement flap [id number]      Cardiac stenting x2      CATARACT EXTRACTION W/  INTRAOCULAR LENS IMPLANT Right 3/29/2016    Dr. Conteh    CATARACT EXTRACTION W/  INTRAOCULAR LENS IMPLANT Left 4/12/2016        COLONOSCOPY N/A 7/23/2020    Procedure: COLONOSCOPY;  Surgeon: Nico Lackey MD;  Location: [facility] (2ND FLR);  Service: Endoscopy;  Laterality: N/A;    DIRECT LARYNGOSCOPY N/A 5/4/2022    Procedure: LARYNGOSCOPY, DIRECT possible biopsy;  Surgeon: Etta Higgins MD;  Location: [facility] OR;  Service: ENT;  Laterality: N/A;  RN PREOP ON 4/29/22. NEEDS T&S DAY OF SURGERY--NF  CONSENTS DAY OF SURGERY    DISSECTION OF NECK Right 5/4/2022    Procedure: RADICAL NECK DISSECTION;  Surgeon: Etta Higgins MD;  Location: [facility] OR;  Service: ENT;  Laterality: Right;  TO FOLLOW DR ALMAZAN    ESOPHAGOGASTRODUODENOSCOPY N/A 7/23/2020    Procedure: EGD (ESOPHAGOGASTRODUODENOSCOPY);  Surgeon: Nico Lackey MD;  Location: [facility] ENDO (2ND FLR);  Service: Endoscopy;  Laterality: N/A;  per Dr Lackey-Will proceed with EGD and colonoscopy on  "the 2nd floor due to his comorbidities including obesity, sleep apnea, restrictive lung disease, coronary artery disease.  has loop recorder      ok to hold Eliquis 2 days per Dr Sandhu-must remain    EXTERNAL FIXATION TIBIAL FRACTURE Left 07/01/2016    INSERTION OF IMPLANTABLE LOOP RECORDER  04/07/2017    LEFT HEART CATHETERIZATION Left 1/30/2020    Procedure: Left heart cath;  Surgeon: Benjamin Sandhu MD;  Location: [facility] CATH LAB;  Service: Cardiology;  Laterality: Left;  RN PREOP 1/28/20--Pt starting Plavix loading dose today (8pills)- Dr. Sandhu aware.  Pt has a bandaged "non healing area to LLE"--Dr. Sandhu aware.    LEFT HEART CATHETERIZATION Left 2/14/2020    Procedure: Left heart cath, IVUS guided left main / LAD PCI. Noon start, radial approach;  Surgeon: Miguel Angel Brady MD;  Location: [facility] CATH LAB;  Service: Cardiology;  Laterality: Left;  RN PRE OP 2-7-2020  BMI--45.61    ORIF TIBIA FRACTURE Left 07/15/2016    PROBING OF NASOLACRIMAL DUCT WITH INSERTION OF TUBE Left 11/30/2020    Procedure: PROBING, NASOLACRIMAL DUCT,;  Surgeon: Teresa Cooper MD;  Location: [facility] OR [address] [place] [address];  Service: Ophthalmology;  Laterality: Left;    RADIOACTIVE SEED IMPLANTATION OF PROSTATE N/A 8/8/2018    Procedure: INSERTION, RADIOACTIVE SEED, PROSTATE;  Surgeon: Bipin Thompson MD;  Location: [facility] OR [address] [place] [address];  Service: Urology;  Laterality: N/A;  1 hour    SKIN BIOPSY      Squamous cell cancer removal x3 with Mohs surgery      TONSILLECTOMY      TOTAL KNEE ARTHROPLASTY  10/2012    TRIGGER FINGER RELEASE Right 10/14/2020    Procedure: RELEASE, TRIGGER FINGER - right;  Surgeon: Rad Swift MD;  Location: [facility] OR;  Service: Orthopedics;  Laterality: Right;    trus/bx      ULTRASOUND GUIDANCE  2/14/2020    Procedure: Ultrasound Guidance;  Surgeon: Miguel Angel Brady MD;  Location: [facility] CATH LAB;  Service: Cardiology;;       Family History:   Family History   Problem Relation Age of Onset    Skin cancer Father     " Lung cancer Father     Cancer Father         smoker,     Alzheimer's disease Mother     Hypertension Mother     Cancer Sister         colon, lung cancer     No Known Problems Sister     Cancer Brother         skin cancer, polypectomy     Peripheral vascular disease Unknown     No Known Problems Maternal Aunt     No Known Problems Maternal Uncle     No Known Problems Paternal Aunt     No Known Problems Paternal Uncle     No Known Problems Maternal Grandmother     No Known Problems Maternal Grandfather     No Known Problems Paternal Grandmother     No Known Problems Paternal Grandfather     Melanoma Neg Hx     Psoriasis Neg Hx     Lupus Neg Hx     Eczema Neg Hx     Amblyopia Neg Hx     Blindness Neg Hx     Cataracts Neg Hx     Diabetes Neg Hx     Glaucoma Neg Hx     Macular degeneration Neg Hx     Retinal detachment Neg Hx     Strabismus Neg Hx     Stroke Neg Hx     Thyroid disease Neg Hx     Acne Neg Hx        Social History:  reports that he has never smoked. He has never used smokeless tobacco. He reports current alcohol use of about 2.0 standard drinks of alcohol per week. He reports that he does not use drugs.    Allergies:  Review of patient's allergies indicates:   Allergen Reactions    Ciprofloxacin Rash     Diffuse pruritic morbilliform rash developed 3/15/2017 after dose of cipro; previously in 2/2017 he had rash/fevers after initiation of cipro    Zosyn [piperacillin-tazobactam] Rash     Diffuse pruritic morbilliform rash developed 3/15/2017.  Then, 430am dose on 3/16 and rash worsened with SOB/tachypnea but no hypoxemia.     Bacitracin Itching and Rash     Violaceous rash in area of topical Tx.        Medications:  Current Outpatient Medications   Medication Sig Dispense Refill    albuterol-ipratropium (DUO-NEB) 2.5 mg-0.5 mg/3 mL nebulizer solution Take 3 mLs by nebulization every 6 (six) hours as needed for Wheezing. Rescue 75 mL 0    amiodarone (PACERONE) 200 MG Tab  Take 1 tablet (200 mg total) by mouth 2 (two) times daily. 60 tablet 11    artificial tears (ISOPTO TEARS) 0.5 % ophthalmic solution Place 1 drop into both eyes as needed.      atorvastatin (LIPITOR) 40 MG tablet Take 1 tablet (40 mg total) by mouth once daily. (Patient taking differently: Take 40 mg by mouth.) 90 tablet 1    clopidogreL (PLAVIX) 75 mg tablet Take 1 tablet by mouth once daily 90 tablet 3    ELIQUIS 5 mg Tab Take 1 tablet by mouth twice daily 180 tablet 3    fluticasone propionate (FLONASE) 50 mcg/actuation nasal spray 1 spray (50 mcg total) by Each Nostril route once daily. 1 Bottle 0    furosemide (LASIX) 20 MG tablet Take 1 tablet (20 mg total) by mouth once daily. 90 tablet 3    gabapentin (NEURONTIN) 300 MG capsule Take 1 capsule (300 mg total) by mouth 3 (three) times daily. 270 capsule 1    meclizine (ANTIVERT) 12.5 mg tablet Take 1 tablet (12.5 mg total) by mouth 3 (three) times daily as needed for Dizziness. 30 tablet 0    melatonin 5 mg Tab Take 10 mg by mouth nightly.      metoprolol succinate (TOPROL-XL) 25 MG 24 hr tablet Take 1 tablet by mouth once daily 90 tablet 3    mupirocin (BACTROBAN) 2 % ointment Apply topically 2 (two) times daily. Apply to qtip and gently roll around on inside of nose, clean with saline for 14 days 1 each 0    nitroGLYCERIN (NITROSTAT) 0.4 MG SL tablet Place 1 tablet (0.4 mg total) under the tongue every 5 (five) minutes as needed for Chest pain. 25 tablet 11    oxybutynin (DITROPAN-XL) 5 MG TR24 Take 1 tablet (5 mg total) by mouth once daily. 30 tablet 11    oxyCODONE-acetaminophen (PERCOCET) 5-325 mg per tablet Take 2 tablets by mouth every 6 (six) hours as needed for Pain. 25 tablet 0    pantoprazole (PROTONIX) 40 MG tablet Take 1 tablet (40 mg total) by mouth once daily. 30 tablet 11    sildenafiL (VIAGRA) 100 MG tablet Take 1 tablet (100 mg total) by mouth daily as needed. 10 tablet 11    tamsulosin (FLOMAX) 0.4 mg Cap Take 1 capsule (0.4  mg total) by mouth once daily. 90 capsule 3    traZODone (DESYREL) 50 MG tablet Take 1 tablet (50 mg total) by mouth nightly as needed for Insomnia. 30 tablet 2    venlafaxine (EFFEXOR) 75 MG tablet Take 2 tablets (150 mg total) by mouth 2 (two) times daily. (Patient taking differently: Take 75 mg by mouth 2 (two) times daily.) 360 tablet 0    vitamin D 1000 units Tab Take 1 tablet (1,000 Units total) by mouth once daily. (Patient taking differently: Take 2,000 Units by mouth once daily.)       No current facility-administered medications for this visit.       Review of Systems   Constitutional: Negative for activity change, appetite change, chills, diaphoresis, fatigue, fever and unexpected weight change.   HENT: Positive for hearing loss. Negative for nosebleeds and trouble swallowing.    Eyes: Negative for visual disturbance.   Respiratory: Positive for shortness of breath. Negative for cough, chest tightness and wheezing.    Cardiovascular: Negative for chest pain and leg swelling.   Gastrointestinal: Negative for abdominal distention, abdominal pain, blood in stool, constipation, diarrhea, nausea and vomiting.   Endocrine: Negative for cold intolerance and heat intolerance.   Genitourinary: Negative for difficulty urinating and dysuria.   Musculoskeletal: Negative for arthralgias and back pain.   Skin: Negative for color change.   Neurological: Positive for weakness and numbness. Negative for dizziness, light-headedness and headaches.   Hematological: Negative for adenopathy. Does not bruise/bleed easily.   Psychiatric/Behavioral: Negative for confusion.       ECOG Performance Status:     ECOG SCORE    1 - Restricted in strenuous activity-ambulatory and able to carry out work of a light nature         Objective:      Vitals:   Vitals:    06/08/22 0848   BP: (!) 156/66   BP Location: Right arm   Patient Position: Sitting   BP Method: Medium (Automatic)   Pulse: 67   Resp: 20   Temp: 98.4 °F (36.9 °C)  "  TempSrc: Oral   SpO2: (!) 93%   Weight: (!) 150.6 kg (332 lb 0.2 oz)   Height: 6' 1" (1.854 m)     BMI: Body mass index is 43.8 kg/m².    Physical Exam  Constitutional:       General: He is not in acute distress.     Appearance: He is obese. He is not ill-appearing.   HENT:      Head: Normocephalic and atraumatic.      Mouth/Throat:      Pharynx: No oropharyngeal exudate or posterior oropharyngeal erythema.   Eyes:      General: No scleral icterus.     Extraocular Movements: Extraocular movements intact.      Conjunctiva/sclera: Conjunctivae normal.      Pupils: Pupils are equal, round, and reactive to light.   Neck:      Comments: Right neck surgical site c/d/i without erythema. Palpable firmness presumed to be post-surgical scarring.  Cardiovascular:      Rate and Rhythm: Normal rate and regular rhythm.      Heart sounds: No murmur heard.    No friction rub. No gallop.   Pulmonary:      Effort: Pulmonary effort is normal. No respiratory distress.      Breath sounds: No stridor. No wheezing, rhonchi or rales.   Abdominal:      General: Bowel sounds are normal. There is no distension.      Palpations: Abdomen is soft. There is no mass.      Tenderness: There is no abdominal tenderness. There is no guarding or rebound.   Musculoskeletal:         General: Normal range of motion.      Cervical back: Normal range of motion and neck supple. No rigidity or tenderness.      Right lower leg: No edema.      Left lower leg: No edema.   Skin:     General: Skin is warm and dry.   Neurological:      General: No focal deficit present.      Mental Status: He is alert.      Sensory: Sensory deficit present.         Laboratory Data:   [facility]  Lab Results   Component Value Date     06/02/2022    K 4.3 06/02/2022     06/02/2022    CO2 27 06/02/2022    BUN 27 (H) 06/02/2022    CREATININE 1.9 (H) 06/02/2022    CALCIUM 9.5 06/02/2022    ANIONGAP 7 (L) 06/02/2022    ESTGFRAFRICA 39 (A) 06/02/2022    EGFRNONAA 34 (A) 06/02/2022 " "    Lab Results   Component Value Date    WBC 10.59 2022    HGB 11.0 (L) 2022    HCT 34.0 (L) 2022     (H) 2022     2022           Imagin/12/22 CT neck with contrast     COMPARISON:  Ultrasound dated 2022     FINDINGS:  There are adjacent necrotic appearing level 2 lymph nodes on the right.  The larger of these measures 3.8 x 2.5 cm and is deep to the sternocleidomastoid muscle, posterior to the internal jugular vein.  There is a smaller, similar appearing adjacent node also noted.  Remaining lymph node stations on the right show no concerning masses.  No significant left cervical adenopathy.     Bilateral parotid glands and submandibular glands are symmetric and without mass.  No drainable fluid collections.     Osseous structures are intact.  No evidence of mastoid effusion.  No acute fracture or destructive lesion.  There is a bone island of the right scapula.  There are degenerative changes of the cervical spine.     Limited evaluation of the intracranial contents show no acute process.  Visualized lungs are clear.     Impression:     Necrotic appearing right level 2 cervical lymph nodes.  There are 2 adjacent lymph nodes, larger of which measures 3.8 x 2.5 cm axial diameter.     This report was flagged in Epic as abnormal.         X-Ray Chest AP Portable    Result Date: 2022  EXAMINATION: XR CHEST AP PORTABLE CLINICAL HISTORY: Provided history is "  Shortness of breath". TECHNIQUE: One view of the chest. COMPARISON: 2022. FINDINGS: Cardiac wires overlie the chest.  Cardiac loop recorder device overlies the left lower chest wall.  Surgical clips overlie the right neck.  Cardiac silhouette is stable.  There are coarsened bibasilar interstitial lung markings and scattered calcified pleural plaques.  Persistent left basilar subsegmental atelectasis versus pleural effusion with blunting of the left costophrenic angle.  No sizable right pleural " effusion.  No pneumothorax.     No detrimental change when compared with 05/05/2022. Electronically signed by: Walker Silva MD Date:    05/13/2022 Time:    07:52    NM PET CT Routine Skull to Mid Thigh    Result Date: 6/5/2022  EXAMINATION: NM PET CT ROUTINE CLINICAL HISTORY: metastatic/recurrent squamous cell cancer to neck ; previous cutaneous scca, s/p neck dissection; Malignant neoplasm of head, face and neck TECHNIQUE: 16.22 mCi of F18-FDG was administered intravenously in the left antecubital fossa.  After an approximately 60 min distribution time, PET/CT images were acquired from the skull base to mid thigh.  Transmission images were acquired to correct for attenuation using a whole body low-dose CT scan without IV contrast with the arms positioned above the head. Glycemia at the time of injection was 115 mg/dL. Additional images were acquired from the skull vertex through the upper chest. COMPARISON: CT soft tissue neck 04/12/2022, CT chest 02/22/2022 FINDINGS: Quality of the study: CT images of the neck are degraded by dense streak artifact from dental amalgam. In the head and neck, multifocal areas of hypermetabolic activity seen throughout the right-sided neck, for example on fused image 58 and fused image 70 of the neck series.  Ill-defined soft tissue attenuation seen within the surgical bed (series 2, image 67) and discrete focal lesions are difficult to delineate due to lack of IV contrast.. In the chest, there are no hypermetabolic lesions worrisome for malignancy.  There are no concerning pulmonary nodules or masses, and there are no pathologically enlarged or hypermetabolic lymph nodes.  Stable bilateral calcified pleural plaques.  Stable left lower lobe atelectasis.  Stable right lower lobe pulmonary micronodule (series 2, image 66). In the abdomen and pelvis, there is physiologic tracer distribution within the abdominal organs and excretion into the genitourinary system.  There is a focal area  of uptake within the small bowel in the mid abdomen with no focal corresponding lesions on CT (axial fused image 128 - 142).  Punctate hepatic calcifications at the dome.  Stable left renal cyst.  Prostatic brachytherapy seeds with symmetric uptake in the prostate, with an SUV max up to 4.2. In the bones, there are no hypermetabolic lesions worrisome for malignancy.  Mildly increased uptake throughout the bone marrow of the axial skeleton, most prominent within the L1 vertebral body without CT correlate and likely relating to hyperplastic marrow.  Mildly increased uptake at the bilateral C7-T1 facet joints, likely degenerative in nature.  Similarly in the left L3-L4 facet joint there is degenerative uptake.  Right fractures of the left ribs 5 through 9. In the extremities, there are no hypermetabolic lesions worrisome for malignancy.     In this patient with metastatic squamous cell carcinoma of the neck there are postoperative changes of right-sided neck dissection.  Multifocal areas of increased uptake the left-sided neck, felt likely postoperative in nature however local disease is difficult to exclude.  Ill-defined soft tissue attenuation in the surgical bed is seen, discrete focal lesions are difficult to delineate on this noncontrast study.  Recommend correlation with surgical margins and attention on follow-up. Prostatic brachytherapy beads with symmetric uptake in the prostate.  Differential considerations include residual/recurrent tumor versus inflammation within benign tissue.  Recommend correlation with PSA and further evaluation with MRI prostate as clinically indicated. Nonspecific prominent metabolic activity within a short segment of small bowel without corresponding lesion on CT.  Recommend correlation with history and physical examination for signs symptoms of enteritis and consideration of additional investigation such as enterography as clinically indicated. Mild increased uptake throughout the  bone marrow of the axial skeleton, most prominent within L1 and likely indicating hyperplastic marrow. I, Guero Chamorro MD, attest that I reviewed and interpreted the images. Electronically signed by resident: Ronald Dorman Date:    06/03/2022 Time:    16:52 Electronically signed by: Guero Chamorro Date:    06/05/2022 Time:    17:02    Echo    Result Date: 5/13/2022  · The left ventricle is normal in size with concentric hypertrophy and normal systolic function. · The estimated ejection fraction is 65%. · Normal left ventricular diastolic function. · Normal right ventricular size with normal right ventricular systolic function. · Mild left atrial enlargement. · Mild-to-moderate aortic regurgitation. · Mild tricuspid regurgitation. · Normal central venous pressure (3 mmHg). · The estimated PA systolic pressure is 16 mmHg. · Very TDS      Assessment:       1. Head and neck cancer    2. Secondary malignant neoplasm of cervical lymph node    3. Basal cell carcinoma (BCC) of overlapping sites of skin    4. Squamous cell carcinoma of overlapping sites of skin           Plan:       Problem List Items Addressed This Visit        Oncology    Basal cell carcinoma (BCC) of overlapping sites of skin    Overview     9/12/13 right anterior shoulder shave biopsy basal cell carcinoma, superficial type  1/31/14 mid forehead shave biopsy inflamed and irritated seborrheic keratosis  7/22/14 left proximal cheek shave biopsy basal cell carcinoma, ulcerated  right lateral cheek shave biopsy irritated seborrheic keratosis  10/13/15 left lateral eyebrow shave biopsy basal cell carcinoma, superficial type  10/9/18 skin forehead shave biopsy basal cell carcinoma  1/9/19 left nasal bridge shave biopsy basal cell carcinoma, nodular type  6/12/20 left lower medial leg punch biopsy basal cell carcinoma  9/28/2020 left medial upper eyelid canthus superficial basal cell carcinoma  9/20/21 cryosurgery for AK           Head and neck cancer - Primary     Overview     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  5/5/22 radical neck dissection level 2-5, sacrifice portion of SCM and accessory nerve due to tumor invasion.  DL was performed; no obvious lesion to biopsy.  Node at least 7 cm with MOHAN. p16 weak-moderate positive.  6/3/22 PET CT with hypermetabolic uptake at right neck (site of surgery). Nonspecific uptake in small bowel (without CT mass correlate), axial skeleton (likely hyperplastic marrow), and prostate (brachytherapy beads present).           Current Assessment & Plan     TxN3M0, at least stage III p16+ squamous cell carcinoma, if presumptive origin head and neck/oropharyngeal.  Reviewed with family that cutaneous origin is thought to be less likely with his p16 positivity.  Nonetheless, even if this were a cutaneous metastatic site, recommendations for adjuvant chemoradiation for MOHAN would still be the same.  -not a candidate for cisplatin with his renal dysfunction  -consented for carboplatin  --tentative start date 6/20/22.  Baseline labs 6/17/22  -will need port; referral to IR for port placement. He is on plavix and apixaban as anticoagulants.  -labs on Fridays weekly at the [facility] [place]  -Follow up with me during 2nd or 3rd week.           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  Then the patient received detailed information about the importance of designating a Health Care Power of  (HCPOA). he was instructed to communicate with this person about their wishes for future healthcare, should he become sick and lose decision-making capacity. The patient has not previously appointed a HCPOA. After our discussion, the patient has decided to complete a HCPOA and has appointed his daughter  I spent a total time of 10 minutes discussing this issue with the patient.       Robert Hargrove MD  Hematology Oncology    I, Dr. Hargrove, personally spent 90 minutes during this encounter.  Time includes face-to-face time and direct counseling, advanced care planning, and/or coordination of care, and non-face-to-face time including review of results including labs and imaging, documentation, orders, and scheduling.

## 2022-06-10 NOTE — PROGRESS NOTES
Subjective:       Patient ID:  Janusz Montgomery Jr. is a 77 y.o. male who presents for   Chief Complaint   Patient presents with    Skin Check     TBSE    Lesion     scalp     History of Present Illness: The patient presents for follow up of skin check.    The patient was last seen on: 1/10/2022 for cryosurgery to actinic keratoses which have resolved, bx SCC left lateral ankle recheck today.  This is a high risk patient here to check for the development of new lesions.    In 6/2021 had SCC right lower neck excised by SSW with + LN involvement (dx 4/22) - starting chemo and radiation    Other skin complaints: lesion to scalp  Patient with new complaint of lesion(s)  Location: scalp  Duration: 6 months  Symptoms: rough  Relieving factors/Previous treatments: cryo      Patient with new complaint of lesion(s)  Location: nose  Duration: 1 month  Symptoms: dark and flaky  Relieving factors/Previous treatments: none          Review of Systems   Skin: Positive for activity-related sunscreen use and wears hat. Negative for daily sunscreen use and recent sunburn.   Hematologic/Lymphatic: Bruises/bleeds easily (on eliquis).        Objective:    Physical Exam   Constitutional: He appears well-developed and well-nourished. He is obese.  No distress.   HENT:   Mouth/Throat: Lips normal.    Eyes: Lids are normal.  No conjunctival no injection.   Cardiovascular: There is dependent edema (legs).     Lymphadenopathy:        Head (right side): No submental, no submandibular, no preauricular, no posterior auricular and no occipital adenopathy present.        Head (left side): No submental, no submandibular, no preauricular, no posterior auricular and no occipital adenopathy present.     He has no axillary adenopathy.   Neurological: He is alert and oriented to person, place, and time. He is not disoriented.   Psychiatric: He has a normal mood and affect.   Skin:   Areas Examined (abnormalities noted in diagram):   Scalp / Hair  Palpated and Inspected  Head / Face Inspection Performed  Neck Inspection Performed  Chest / Axilla Inspection Performed  Back Inspection Performed  RUE Inspected  LUE Inspection Performed  RLE Inspected  LLE Inspection Performed                   Diagram Legend     Erythematous scaling macule/papule c/w actinic keratosis       Vascular papule c/w angioma      Pigmented verrucoid papule/plaque c/w seborrheic keratosis      Yellow umbilicated papule c/w sebaceous hyperplasia      Irregularly shaped tan macule c/w lentigo     1-2 mm smooth white papules consistent with Milia      Movable subcutaneous cyst with punctum c/w epidermal inclusion cyst      Subcutaneous movable cyst c/w pilar cyst      Firm pink to brown papule c/w dermatofibroma      Pedunculated fleshy papule(s) c/w skin tag(s)      Evenly pigmented macule c/w junctional nevus     Mildly variegated pigmented, slightly irregular-bordered macule c/w mildly atypical nevus      Flesh colored to evenly pigmented papule c/w intradermal nevus       Pink pearly papule/plaque c/w basal cell carcinoma      Erythematous hyperkeratotic cursted plaque c/w SCC      Surgical scar with no sign of skin cancer recurrence      Open and closed comedones      Inflammatory papules and pustules      Verrucoid papule consistent consistent with wart     Erythematous eczematous patches and plaques     Dystrophic onycholytic nail with subungual debris c/w onychomycosis     Umbilicated papule    Erythematous-base heme-crusted tan verrucoid plaque consistent with inflamed seborrheic keratosis     Erythematous Silvery Scaling Plaque c/w Psoriasis     See annotation      Assessment / Plan:        AK (actinic keratosis)  Cryosurgery Procedure Note    Verbal consent from the patient is obtained including, but not limited to, risk of hypopigmentation/hyperpigmentation, scar, recurrence of lesion. The patient is aware of the precancerous quality and need for treatment of these lesions.  Liquid nitrogen cryosurgery is applied to the 4 actinic keratoses, as detailed in the physical exam, to produce a freeze injury. The patient is aware that blisters may form and is instructed on wound care with gentle cleansing and use of vaseline ointment to keep moist until healed. The patient is supplied a handout on cryosurgery and is instructed to call if lesions do not completely resolve.      SK (seborrheic keratosis)  These are benign inherited growths without a malignant potential. Reassurance given to patient. No treatment is necessary.       History of skin cancer with SCC mets to neck  Area(s) of previous NMSC evaluated with no signs of recurrence.    Upper body skin examination performed today including at least 6 points as noted in physical examination. No lesions suspicious for malignancy noted.    Recommend daily sun protection/avoidance and use of at least SPF 30, broad spectrum sunscreen (OTC drug).       Stasis dermatitis of both legs  Elevate legs  moisturization             Follow up in about 6 months (around 12/10/2022).

## 2022-06-10 NOTE — PATIENT INSTRUCTIONS

## 2022-06-14 NOTE — TELEPHONE ENCOUNTER
----- Message from Evi Shelton sent at 6/14/2022 10:15 AM CDT -----  Regarding: Janusz Montgomery Radiation Therapist Estela Nance with [facility] calling to find out when the pt is starting Chemo and she would like a call back this morning at [phone]  Patient Advice:    Janusz Montgomery Radiation Therapist Estela Nance with [facility] calling to find out when the pt is starting Chemo and she would like a call back this morning at [phone]

## 2022-06-14 NOTE — TELEPHONE ENCOUNTER
"----- Message from Selena Kilpatrick sent at 6/14/2022 11:58 AM CDT -----  Regarding: Speak with office  Contact: Radha  Consult/Advisory:       Name Of Caller:Radha Torres(Stepdaughter)      Contact Preference?:[phone]       Does patient feel the need to be seen today?      What is the nature of the call?: pt is getting port put in on 06/16/22 and would like to get peg tube put in on same day as well.       Additional Notes:  "Thank you for all that you do for our patients'"      "

## 2022-06-15 NOTE — TELEPHONE ENCOUNTER
Patient states he will call back for appt change if needed after he speaks with chemo   Note Text (......Xxx Chief Complaint.): This diagnosis correlates with the Detail Level: Zone Render Risk Assessment In Note?: yes

## 2022-06-15 NOTE — PRE-PROCEDURE INSTRUCTIONS
PRE-OP INSTRUCTIONS:  Instructed patient to have no food,milk or milk products after midnight   It is ok to take AM medications with a few sips of water   Medication instructions for pm prior to and am of surgery reviewed.  Instructed patient to avoid taking vitamins,supplements,aspirin or ibuprofen the am of surgery.  Shower instructions provided    Patient denies any side effects or issues with anesthesia or sedation.     PT WEIGHT 332#  LOOP RECORDER(NOT FUNCTIONAL)

## 2022-06-15 NOTE — TELEPHONE ENCOUNTER
----- Message from Radha Yanez sent at 6/15/2022  2:30 PM CDT -----  Type:  Sooner Appointment Request    Patient is requesting a sooner appointment.  Patient declined first available appointment listed as well as another facility and provider .  Patient will not accept being placed on the waitlist and is requesting a message be sent to doctor.    Name of Caller: Self    When is the first available appointment?  He is sched 7/11 but cannot make....   Sept    Symptoms:  needs sooner jerry he has chemo set up and needs sooner appt    Would the patient rather a call back or a response via [facility]? call    Best Call Back Number: .[phone]

## 2022-06-16 NOTE — TRANSFER OF CARE
"Anesthesia Transfer of Care Note    Patient: Janusz Montgomery Jr.    Procedure(s) Performed: * No procedures listed *    Patient location: [facility]    Anesthesia Type: MAC    Transport from OR: Transported from OR on 6-10 L/min O2 by face mask with adequate spontaneous ventilation    Post pain: adequate analgesia    Post assessment: no apparent anesthetic complications and tolerated procedure well    Post vital signs: stable    Level of consciousness: awake, alert and oriented    Nausea/Vomiting: no nausea/vomiting    Complications: none    Transfer of care protocol was followed      Last vitals:   Visit Vitals  BP (!) 157/67 (BP Location: Right arm, Patient Position: Lying)   Pulse 61   Temp 36.7 °C (98 °F) (Temporal)   Resp 18   Ht 6' 1" (1.854 m)   Wt (!) 152 kg (335 lb)   SpO2 99%   BMI 44.20 kg/m²     "

## 2022-06-16 NOTE — ANESTHESIA PREPROCEDURE EVALUATION
06/16/2022  Janusz Montgomery Jr. is a 77 y.o., male. Scheduled for port placement. History of CHARISMA, Obesity, HLD, afib, GERD, CAD, HTN    Hgb 11  Platelets 203  INR 1  Creatinien 1.6    · The left ventricle is normal in size with concentric hypertrophy and normal systolic function.  · The estimated ejection fraction is 65%.  · Normal left ventricular diastolic function.  · Normal right ventricular size with normal right ventricular systolic function.  · Mild left atrial enlargement.  · Mild-to-moderate aortic regurgitation.  · Mild tricuspid regurgitation.  · Normal central venous pressure (3 mmHg).  · The estimated PA systolic pressure is 16 mmHg.  · Very TDS    Patient had a neck dissection a few weeks ago. Per record review, there were no complications. However, he developed afib and went to the ER a few days post-op and was discharged from the ED. He denies associated chest pain but does complain of siginficant shortness of breath. He had stents placed over 1 year ago. Is on 4L of oxygen at home and says he always feels SOB and has trouble laying flat.     Pre-op Assessment    I have reviewed the Patient Summary Reports.     I have reviewed the Nursing Notes. I have reviewed the NPO Status.   I have reviewed the Medications.     Review of Systems  Anesthesia Hx:   Denies Personal Hx of Anesthesia complications.   Cardiovascular:   Hypertension Denies MI. CAD    Denies Dysrhythmias.     Pulmonary:   Denies COPD.  Denies Asthma. Shortness of breath Sleep Apnea    Renal/:   Chronic Renal Disease    Hepatic/GI:   GERD Liver Disease,    Neurological:   Denies TIA. Denies CVA. Denies Seizures.    Endocrine:   Denies Diabetes. Denies Hypothyroidism. Denies Hyperthyroidism.        Physical Exam  General: Well nourished, Cooperative, Alert and Oriented    Airway:  Mallampati: II / I  TM Distance: Normal  Tongue:  Normal  Neck ROM: Normal ROM        Anesthesia Plan  Type of Anesthesia, risks & benefits discussed:    Anesthesia Type: MAC  Intra-op Monitoring Plan: Standard ASA Monitors  Post Op Pain Control Plan: multimodal analgesia  Induction:  IV  Informed Consent: Informed consent signed with the Patient and all parties understand the risks and agree with anesthesia plan.  All questions answered.   ASA Score: 4    Ready For Surgery From Anesthesia Perspective.     .

## 2022-06-16 NOTE — DISCHARGE INSTRUCTIONS
POSTOPERATIVE INSTRUCTIONS FOLLOWING PORT-A-CATH PLACMENT    The following are post-operative instructions that will help you to recover from your surgery.  Please read over these instructions carefully and contact us if we can answer any of your questions or concerns.    Dressing  You may shower after 2 days.  Do not take a tub bath and do not soak the surgical site. Please do not remove the white strips of tape (steri-strips) that cover your incision.  They will be removed at your clinic visit.    Activity   You should be able to return to your regular activities 2 days after your surgery.  However, do not engage in strenuous activities in which you use your upper body such as:  golf, tennis, aerobics, washing windows, raking the yard, mopping, vacuuming, heavy lifting (e.g children) until you are seen for your follow-up appointment in clinic.    Medication for pain  You may find that over the counter pain medications may be sufficient for your pain.  You will be given a prescription for pain medication for more severe pain.  You should not drive or operate machinery while taking these.  Please take narcotics with food.  Narcotics can cause, or worse, constipation.  You will need to increase your fluid intake, eat high fiber foods (such as fruits and bran) and make sure that you are up and walking. You may need to take an over the counter stool softener for constipation.    Please report the following:  Temperature greater than 101 degrees  Discharge or bad odor from the wound  Excessive bleeding, such as bloody dressing or extreme bruising  Redness at incision and/or drain sites  Swelling or buildup of fluid around incision  Shortness of breath

## 2022-06-16 NOTE — H&P
Inpatient Radiology Pre-procedure Note    History of Present Illness:  Janusz Montgomery Jr. is a 77 y.o. male with pmhx of stage III squamous cell carcinoma, if presumptive origin head and neck/oropharyngeal who presented for port placement for chemotherapy.    Admission H&P reviewed.    Past Medical History:   Diagnosis Date    (HFpEF) heart failure with preserved ejection fraction 02/04/2022    NAT (acute kidney injury) 03/19/2017    ALLERGIC RHINITIS     Anemia     Anxiety     Basal cell carcinoma 10/19/2018    forehead and right medial shoulder    Basal cell carcinoma 01/09/2019    left nasal bridge and left posterior ear    Basal cell carcinoma 06/12/2020    left lower medial leg - Efudex    Basal cell carcinoma 11/30/2020    left medial upper eyelid canthus superficial BCC    Chronic rhinitis 05/03/2013    Chronic rhinitis 05/03/2013    Coronary artery disease involving native coronary artery of native heart without angina pectoris s/p RCA stent     Cortical cataract of both eyes 03/18/2016    Delayed sleep phase syndrome 03/13/2019    Depression     Erectile dysfunction 03/24/2014    Erectile dysfunction 03/24/2014    Essential hypertension     GERD (gastroesophageal reflux disease) 07/25/2012    Gout, arthritis     Grade III open fracture of left tibia and fibula s/p ex-fix on 7/1/16 and ORIF of left tibia on 7/15 07/06/2016    H/O: iritis     Helicobacter pylori (H. pylori) infection     Treated    Hepatitis     Herpes simplex keratoconjunctivitis 09/30/2015    - on acyclovir - followed by opthalmology, Dr. Uribe     Herpes simplex keratoconjunctivitis 09/30/2015    - on acyclovir - followed by opthalmology, Dr. Uribe     Hyperkalemia 02/28/2017    Hyperlipidemia     Hypogonadism male     Hypogonadism male     Keloid cicatrix     Mixed anxiety and depressive disorder     Morbid obesity     Obstructive sleep apnea on CPAP     CPAP    Osteoarthritis of left knee 07/25/2012  "   Paroxysmal atrial fibrillation 07/06/2016    Primary osteoarthritis of left knee 07/25/2012    Prominent aorta 01/25/2016    "RESULTS: THE HEART IS MILDLY ENLARGED WITH A SLIGHTLY PROMINENT AORTA" - Xray Chest PA & Lateral 12-     Prostate cancer 02/15/2016    - followed by urology, Dr. Young     Prostate cancer 02/15/2016    - followed by urology, Dr. 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    chest and forehead    Squamous cell cancer of skin of nose     Traumatic type III open fracture of shaft of left tibia and fibula with nonunion 07/06/2016    Type III open fracture of left tibia and fibula with routine healing 07/06/2016    Vitamin D deficiency disease     Vitreous detachment 03/18/2016     Past Surgical History:   Procedure Laterality Date    ADJACENT TISSUE TRANSFER Left 11/30/2020    Procedure: ADJACENT TISSUE TRANSFER x2 ;  Surgeon: Teresa Cooper MD;  Location: [facility] 1ST FLR;  Service: Ophthalmology;  Laterality: Left;  Left glabellar 8.5x11x3 and Left upper eyelid advancement flap [id number]      Cardiac stenting x2      CATARACT EXTRACTION W/  INTRAOCULAR LENS IMPLANT Right 3/29/2016    Dr. Conteh    CATARACT EXTRACTION W/  INTRAOCULAR LENS IMPLANT Left 4/12/2016        COLONOSCOPY N/A 7/23/2020    Procedure: COLONOSCOPY;  Surgeon: Nico Lackey MD;  Location: [facility] ENDO (2ND FLR);  Service: Endoscopy;  Laterality: N/A;    DIRECT LARYNGOSCOPY N/A 5/4/2022    Procedure: LARYNGOSCOPY, DIRECT possible biopsy;  Surgeon: Etta Higgins MD;  Location: [facility] OR;  Service: ENT;  Laterality: N/A;  RN PREOP ON 4/29/22. NEEDS T&S DAY OF SURGERY--NF  CONSENTS " "DAY OF SURGERY    DISSECTION OF NECK Right 5/4/2022    Procedure: RADICAL NECK DISSECTION;  Surgeon: Etta Higgins MD;  Location: [facility] OR;  Service: ENT;  Laterality: Right;  TO FOLLOW DR ALMAZAN    ESOPHAGOGASTRODUODENOSCOPY N/A 7/23/2020    Procedure: EGD (ESOPHAGOGASTRODUODENOSCOPY);  Surgeon: Nico Lackey MD;  Location: [facility] (2ND FLR);  Service: Endoscopy;  Laterality: N/A;  per Dr Lackey-Will proceed with EGD and colonoscopy on the 2nd floor due to his comorbidities including obesity, sleep apnea, restrictive lung disease, coronary artery disease.  has loop recorder      ok to hold Eliquis 2 days per Dr Sandhu-must remain    EXTERNAL FIXATION TIBIAL FRACTURE Left 07/01/2016    INSERTION OF IMPLANTABLE LOOP RECORDER  04/07/2017    LEFT HEART CATHETERIZATION Left 1/30/2020    Procedure: Left heart cath;  Surgeon: Benjamin Sandhu MD;  Location: [facility] CATH LAB;  Service: Cardiology;  Laterality: Left;  RN PREOP 1/28/20--Pt starting Plavix loading dose today (8pills)- Dr. Sandhu aware.  Pt has a bandaged "non healing area to LLE"--Dr. Sandhu aware.    LEFT HEART CATHETERIZATION Left 2/14/2020    Procedure: Left heart cath, IVUS guided left main / LAD PCI. Noon start, radial approach;  Surgeon: Miguel Angel Brady MD;  Location: [facility] CATH LAB;  Service: Cardiology;  Laterality: Left;  RN PRE OP 2-7-2020  BMI--45.61    ORIF TIBIA FRACTURE Left 07/15/2016    PROBING OF NASOLACRIMAL DUCT WITH INSERTION OF TUBE Left 11/30/2020    Procedure: PROBING, NASOLACRIMAL DUCT,;  Surgeon: Teresa Cooper MD;  Location: [facility] OR 1ST FLR;  Service: Ophthalmology;  Laterality: Left;    RADIOACTIVE SEED IMPLANTATION OF PROSTATE N/A 8/8/2018    Procedure: INSERTION, RADIOACTIVE SEED, PROSTATE;  Surgeon: Bipin Thompson MD;  Location: [facility] OR 1ST FLR;  Service: Urology;  Laterality: N/A;  1 hour    SKIN BIOPSY      Squamous cell cancer removal x3 with Mohs surgery      TONSILLECTOMY      TOTAL KNEE ARTHROPLASTY  " 10/2012    TRIGGER FINGER RELEASE Right 10/14/2020    Procedure: RELEASE, TRIGGER FINGER - right;  Surgeon: Rad Swift MD;  Location: [facility] OR;  Service: Orthopedics;  Laterality: Right;    trus/bx      ULTRASOUND GUIDANCE  2/14/2020    Procedure: Ultrasound Guidance;  Surgeon: Miguel Angel Brady MD;  Location: [facility] CATH LAB;  Service: Cardiology;;       Review of Systems:   As documented in primary team H&P    Home Meds:   Prior to Admission medications    Medication Sig Start Date End Date Taking? Authorizing Provider   acyclovir (ZOVIRAX) 800 MG Tab Take 200 mg by mouth 2 (two) times daily.    Historical Provider   albuterol-ipratropium (DUO-NEB) 2.5 mg-0.5 mg/3 mL nebulizer solution Take 3 mLs by nebulization every 6 (six) hours as needed for Wheezing. Rescue 5/16/22 5/16/23  Romero Martin MD   amiodarone (PACERONE) 200 MG Tab Take 1 tablet (200 mg total) by mouth 2 (two) times daily. 5/16/22 5/16/23  Romero Martin MD   artificial tears (ISOPTO TEARS) 0.5 % ophthalmic solution Place 1 drop into both eyes as needed. 1/23/17   Mike Hendrix, SHAKEEL   atorvastatin (LIPITOR) 40 MG tablet Take 1 tablet (40 mg total) by mouth once daily.  Patient taking differently: Take 40 mg by mouth every evening. 4/1/22   Miguelina Weathers MD   clopidogreL (PLAVIX) 75 mg tablet Take 1 tablet by mouth once daily  Patient taking differently: Take by mouth once daily. 4/24/22   Benjamin Sandhu MD   dexAMETHasone (DECADRON) 4 MG Tab 4mg (1 tablet) by mouth twice a day for two days starting the day after chemo.  Repeat after each dose of chemo. 6/8/22   Robert Hargrove MD   ELIQUIS 5 mg Tab Take 1 tablet by mouth twice daily 10/4/21   Benjamin Sandhu MD   fluticasone propionate (FLONASE) 50 mcg/actuation nasal spray 1 spray (50 mcg total) by Each Nostril route once daily. 1/2/20   Miguelina Weathers MD   furosemide (LASIX) 20 MG tablet Take 1 tablet (20 mg total) by mouth once daily.  Patient taking differently: Take 20 mg by  mouth every morning. 4/8/22 4/3/23  Malini Pagan, NP   gabapentin (NEURONTIN) 300 MG capsule Take 1 capsule (300 mg total) by mouth 3 (three) times daily.  Patient taking differently: Take 300 mg by mouth 3 (three) times daily as needed. 9/3/20   Miguelina Weathers MD   LIDOcaine-prilocaine (EMLA) cream Apply generously to port site 30-60 min prior to chemo and then cover with saran wrap. 6/8/22   Robert Hargrove MD   meclizine (ANTIVERT) 12.5 mg tablet Take 1 tablet (12.5 mg total) by mouth 3 (three) times daily as needed for Dizziness. 5/25/22   Last Cannon PA-C   melatonin 5 mg Tab Take 10 mg by mouth nightly.    Historical Provider   metoprolol succinate (TOPROL-XL) 25 MG 24 hr tablet Take 1 tablet by mouth once daily  Patient taking differently: Take by mouth every morning. 1/14/22   Benjamin Sandhu MD   nitroGLYCERIN (NITROSTAT) 0.4 MG SL tablet Place 1 tablet (0.4 mg total) under the tongue every 5 (five) minutes as needed for Chest pain. 6/29/21 6/29/22  Benjamin Sandhu MD   ondansetron (ZOFRAN) 8 MG tablet Take 1 tablet (8 mg total) by mouth every 8 (eight) hours as needed for Nausea. 6/8/22   Robert Hargrove MD   oxybutynin (DITROPAN-XL) 5 MG TR24 Take 1 tablet (5 mg total) by mouth once daily.  Patient taking differently: Take 5 mg by mouth every evening. 2/2/22 2/2/23  AURELIANO Minor MD   oxyCODONE-acetaminophen (PERCOCET) 5-325 mg per tablet Take 2 tablets by mouth every 6 (six) hours as needed for Pain. 5/11/22   Etta Higgins MD   pantoprazole (PROTONIX) 40 MG tablet Take 1 tablet (40 mg total) by mouth once daily.  Patient taking differently: Take 40 mg by mouth every evening. 5/12/22 5/12/23  Roemro Martin MD   sildenafiL (VIAGRA) 100 MG tablet Take 1 tablet (100 mg total) by mouth daily as needed. 2/2/22   AURELIANO Minor MD   tamsulosin (FLOMAX) 0.4 mg Cap Take 1 capsule (0.4 mg total) by mouth once daily.  Patient taking differently: Take 1 capsule by mouth every evening. 6/17/21    AURELIANO Minor MD   traZODone (DESYREL) 50 MG tablet Take 1 tablet (50 mg total) by mouth nightly as needed for Insomnia. 4/27/22 7/26/22  Pito Claros DO   venlafaxine (EFFEXOR) 75 MG tablet Take 2 tablets (150 mg total) by mouth 2 (two) times daily.  Patient taking differently: Take 75 mg by mouth every morning. 6/17/21   Miguelina Weathers MD   vitamin D 1000 units Tab Take 1 tablet (1,000 Units total) by mouth once daily.  Patient taking differently: Take 2,000 Units by mouth once daily. 8/5/16   Shivani Espinosa MD   albuterol (VENTOLIN HFA) 90 mcg/actuation inhaler Inhale 2 puffs into the lungs every 6 (six) hours as needed for Wheezing. Rescue 5/7/22 5/16/22  Etta Higgins MD   omeprazole (PRILOSEC) 20 MG capsule Take 1 capsule (20 mg total) by mouth daily as needed. 4/17/20 5/7/22  Miguelina Weathers MD     Scheduled Meds:   Continuous Infusions:   PRN Meds:  Anticoagulants/Antiplatelets: Plavix and Eliquis    Allergies:   Review of patient's allergies indicates:   Allergen Reactions    Ciprofloxacin Rash     Diffuse pruritic morbilliform rash developed 3/15/2017 after dose of cipro; previously in 2/2017 he had rash/fevers after initiation of cipro    Zosyn [piperacillin-tazobactam] Rash     Diffuse pruritic morbilliform rash developed 3/15/2017.  Then, 430am dose on 3/16 and rash worsened with SOB/tachypnea but no hypoxemia.     Bacitracin Itching and Rash     Violaceous rash in area of topical Tx.      Sedation Hx: have not been any systemic reactions    Labs:  Recent Labs   Lab 06/15/22  1154   INR 1.0     No results for input(s): WBC, HGB, HCT, MCV, PLT in the last 168 hours.   Recent Labs   Lab 06/10/22  1111   GLU 90      K 4.5      CO2 29   BUN 23   CREATININE 1.6*   CALCIUM 9.0         Vitals:        Physical Exam:  ASA: Per anesthesia  Mallampati: Per anesthesia    General: no acute distress  Mental Status: alert and oriented to person, place and time  HEENT:  normocephalic, atraumatic  Chest: unlabored breathing  Heart: regular heart rate  Abdomen: nondistended  Extremity: moves all extremities      Plan:  Sedation Plan: Per anesthesia.  Patient will undergo port placement .          Ni Moreno MD  Radiology Resident PGY- 2  [facility]

## 2022-06-16 NOTE — PLAN OF CARE
Pt arrived to  189 for chest port plcmnt. Pt oriented to unit and staff. Plan of care reviewed with patient, patient verbalizes understanding. Comfort measures utilized. Pt safely transferred from stretcher to procedural table. Fall risk reviewed with patient, fall risk interventions maintained. Safety strap applied, positioner pillows utilized to minimize pressure points. Blankets applied. Pt prepped and draped utilizing standard sterile technique. Patient placed on continuous monitoring and monitored per anesthesia. Timeouts completed utilizing standard universal time-out, per department and facility policy. RN to remain at bedside. Pt resting comfortably. Denies pain/discomfort. Will continue to monitor. See flow sheets for monitoring, medication administration, and updates.

## 2022-06-17 NOTE — ANESTHESIA POSTPROCEDURE EVALUATION
Anesthesia Post Evaluation    Patient: Janusz Montgomery Jr.    Procedure(s) Performed: * No procedures listed *    Final Anesthesia Type: MAC      Patient location during evaluation: PACU  Patient participation: Yes- Able to Participate  Level of consciousness: awake and alert  Post-procedure vital signs: reviewed and stable  Pain management: adequate  Airway patency: patent    PONV status at discharge: No PONV  Anesthetic complications: no      Cardiovascular status: blood pressure returned to baseline  Respiratory status: unassisted and spontaneous ventilation  Hydration status: euvolemic  Follow-up not needed.          Vitals Value Taken Time   /66 06/16/22 1315   Temp 36.8 °C (98.2 °F) 06/16/22 1214   Pulse 60 06/16/22 1315   Resp 22 06/16/22 1315   SpO2 100 % 06/16/22 1315         No case tracking events are documented in the log.      Pain/Serjio Score: Serjio Score: 9 (6/16/2022  1:45 PM)

## 2022-06-17 NOTE — TELEPHONE ENCOUNTER
Patient requesting Chemotherappy care from the [facility]. Told him I would forward message to Dr. Hargrove and someone would follow up with him on Monday.

## 2022-06-17 NOTE — TELEPHONE ENCOUNTER
"----- Message from Sofi Frias sent at 6/17/2022  3:40 PM CDT -----  Name Of Caller: self    Contact Preference?: [phone]    What is the nature of the call?: requesting a call back to discuss getting on [facility]- chemotherpay care           Additional Notes:  "Thank you for all that you do for our patients'"     "

## 2022-06-17 NOTE — PLAN OF CARE
Patient arrived to unit in w/c  for PAC labs accompanied by family member. Pt with 4L O2 per n/c. Port placed 6/16, transparent drsg noted, minor swelling at incision site. Plan of care reviewed, patient agreeable to plan. Steri strips in place, + blood return noted, flushes easily. Heparin administered to dwell. Patient tolerated procedure well. VSS. Discharge instructions reviewed, patient instructed to return 6/20 for Start of Radiation therapy/Carbo. Patient left off unit in w/c  escorted by family member. Patient in NAD at time of discharge.

## 2022-06-24 NOTE — PLAN OF CARE
Pt's weekly labs for upcoming tx drawn. No complaints to report. Tolerated PAC blood draw well. Understands to return Monday for tx. Discharged from unit in NAD.

## 2022-06-26 NOTE — ED TRIAGE NOTES
Patient reports felt a pop yesterday to left ankle, states had previous SX to left with previous madonna displacement, denies pain,is able to move ankle, state hurts to bear weight and was hesitant to bear weight due to previous injury.

## 2022-06-26 NOTE — ED PROVIDER NOTES
"Encounter Date: 6/26/2022    SCRIBE #1 NOTE: I, Lady Pace, am scribing for, and in the presence of, Lexie Bedolla MD.       History     Chief Complaint   Patient presents with    Leg Problem     Pt reporting left leg madonna possibly broke. Pt reporting "something protruding" x last night. He is unsure if the madonna is broken. Pt has not put any weigh ton leg. Pt ambulating in triage with walker. Pt on 4 L of oxygen at BL.     Patient is a 77 y.o. male with PMHx of titanium madonna placement, PVD, CAD, HLP, and NAT, presenting to the ED complaining of LLE pain that began and subsided last night. The patient reports he felt something "sticking out of his leg", as well as pain with dorsiflexion and weight bearing, but notes his complains subsided since last night. Patient reports no associated sx. He does report having a fall 3 weeks ago. No medications taken PTA. No alleviating or exacerbating factors noted.  Allergies are Ciprofloxacin, Zosyn, and Bacitracin.    The history is provided by the patient. No  was used.     Review of patient's allergies indicates:   Allergen Reactions    Ciprofloxacin Rash     Diffuse pruritic morbilliform rash developed 3/15/2017 after dose of cipro; previously in 2/2017 he had rash/fevers after initiation of cipro    Zosyn [piperacillin-tazobactam] Rash     Diffuse pruritic morbilliform rash developed 3/15/2017.  Then, 430am dose on 3/16 and rash worsened with SOB/tachypnea but no hypoxemia.     Bacitracin Itching and Rash     Violaceous rash in area of topical Tx.      Past Medical History:   Diagnosis Date    (HFpEF) heart failure with preserved ejection fraction 02/04/2022    NAT (acute kidney injury) 03/19/2017    ALLERGIC RHINITIS     Anemia     Anxiety     Basal cell carcinoma 10/19/2018    forehead and right medial shoulder    Basal cell carcinoma 01/09/2019    left nasal bridge and left posterior ear    Basal cell carcinoma 06/12/2020    left " "lower medial leg - Efudex    Basal cell carcinoma 11/30/2020    left medial upper eyelid canthus superficial BCC    Chronic rhinitis 05/03/2013    Chronic rhinitis 05/03/2013    Coronary artery disease involving native coronary artery of native heart without angina pectoris s/p RCA stent     Cortical cataract of both eyes 03/18/2016    Delayed sleep phase syndrome 03/13/2019    Depression     Erectile dysfunction 03/24/2014    Erectile dysfunction 03/24/2014    Essential hypertension     GERD (gastroesophageal reflux disease) 07/25/2012    Gout, arthritis     Grade III open fracture of left tibia and fibula s/p ex-fix on 7/1/16 and ORIF of left tibia on 7/15 07/06/2016    H/O: iritis     Helicobacter pylori (H. pylori) infection     Treated    Hepatitis     Herpes simplex keratoconjunctivitis 09/30/2015    - on acyclovir - followed by opthalmology, Dr. Uribe     Herpes simplex keratoconjunctivitis 09/30/2015    - on acyclovir - followed by opthalmology, Dr. Uribe     Hyperkalemia 02/28/2017    Hyperlipidemia     Hypogonadism male     Hypogonadism male     Keloid cicatrix     Mixed anxiety and depressive disorder     Morbid obesity     Obstructive sleep apnea on CPAP     CPAP    Osteoarthritis of left knee 07/25/2012    Paroxysmal atrial fibrillation 07/06/2016    Primary osteoarthritis of left knee 07/25/2012    Prominent aorta 01/25/2016    "RESULTS: THE HEART IS MILDLY ENLARGED WITH A SLIGHTLY PROMINENT AORTA" - Xray Chest PA & Lateral 12-     Prostate cancer 02/15/2016    - followed by urology, Dr. Young     Prostate cancer 02/15/2016    - followed by urology, Dr. Young     PVD (peripheral vascular disease)     Refractive error 03/18/2016    Respiratory failure, unspecified with hypoxia 02/04/2022    SCC (squamous cell carcinoma) 06/29/2021    R trap    SCC (squamous cell carcinoma) 01/10/2022    left lateral ankle    Skin disease     Skin ulcer     Squamous " cell cancer of buccal mucosa 10/2015    chest and forehead    Squamous cell cancer of skin of nose     Traumatic type III open fracture of shaft of left tibia and fibula with nonunion 07/06/2016    Type III open fracture of left tibia and fibula with routine healing 07/06/2016    Vitamin D deficiency disease     Vitreous detachment 03/18/2016     Past Surgical History:   Procedure Laterality Date    ADJACENT TISSUE TRANSFER Left 11/30/2020    Procedure: ADJACENT TISSUE TRANSFER x2 ;  Surgeon: Teresa Cooper MD;  Location: [facility] 1ST FLR;  Service: Ophthalmology;  Laterality: Left;  Left glabellar 8.5x11x3 and Left upper eyelid advancement flap [id number]      Cardiac stenting x2      CATARACT EXTRACTION W/  INTRAOCULAR LENS IMPLANT Right 3/29/2016    Dr. Conteh    CATARACT EXTRACTION W/  INTRAOCULAR LENS IMPLANT Left 4/12/2016        COLONOSCOPY N/A 7/23/2020    Procedure: COLONOSCOPY;  Surgeon: Nico Lackey MD;  Location: [facility] (2ND FLR);  Service: Endoscopy;  Laterality: N/A;    DIRECT LARYNGOSCOPY N/A 5/4/2022    Procedure: LARYNGOSCOPY, DIRECT possible biopsy;  Surgeon: Etta Higgins MD;  Location: [facility] OR;  Service: ENT;  Laterality: N/A;  RN PREOP ON 4/29/22. NEEDS T&S DAY OF SURGERY--NF  CONSENTS DAY OF SURGERY    DISSECTION OF NECK Right 5/4/2022    Procedure: RADICAL NECK DISSECTION;  Surgeon: Etta Higgins MD;  Location: [facility] OR;  Service: ENT;  Laterality: Right;  TO FOLLOW DR ALMAZAN    ESOPHAGOGASTRODUODENOSCOPY N/A 7/23/2020    Procedure: EGD (ESOPHAGOGASTRODUODENOSCOPY);  Surgeon: Nico Lackey MD;  Location: [facility] (2ND FLR);  Service: Endoscopy;  Laterality: N/A;  per Dr Lackey-Will proceed with EGD and colonoscopy on the 2nd floor due to his comorbidities including obesity, sleep apnea, restrictive lung disease, coronary artery disease.  has loop recorder      ok to hold Eliquis 2 days per Dr Sandhu-must remain    EXTERNAL FIXATION TIBIAL  "FRACTURE Left 07/01/2016    INSERTION OF IMPLANTABLE LOOP RECORDER  04/07/2017    LEFT HEART CATHETERIZATION Left 1/30/2020    Procedure: Left heart cath;  Surgeon: Benjamin Sandhu MD;  Location: [facility] CATH LAB;  Service: Cardiology;  Laterality: Left;  RN PREOP 1/28/20--Pt starting Plavix loading dose today (8pills)- Dr. Sandhu aware.  Pt has a bandaged "non healing area to LLE"--Dr. Sandhu aware.    LEFT HEART CATHETERIZATION Left 2/14/2020    Procedure: Left heart cath, IVUS guided left main / LAD PCI. Noon start, radial approach;  Surgeon: Miguel Angel Brady MD;  Location: [facility] CATH LAB;  Service: Cardiology;  Laterality: Left;  RN PRE OP 2-7-2020  BMI--45.61    ORIF TIBIA FRACTURE Left 07/15/2016    PROBING OF NASOLACRIMAL DUCT WITH INSERTION OF TUBE Left 11/30/2020    Procedure: PROBING, NASOLACRIMAL DUCT,;  Surgeon: Teresa Cooper MD;  Location: [facility] OR [address] [place] [address];  Service: Ophthalmology;  Laterality: Left;    RADIOACTIVE SEED IMPLANTATION OF PROSTATE N/A 8/8/2018    Procedure: INSERTION, RADIOACTIVE SEED, PROSTATE;  Surgeon: Bipin Thompson MD;  Location: [facility] OR [address] [place] [address];  Service: Urology;  Laterality: N/A;  1 hour    SKIN BIOPSY      Squamous cell cancer removal x3 with Mohs surgery      TONSILLECTOMY      TOTAL KNEE ARTHROPLASTY  10/2012    TRIGGER FINGER RELEASE Right 10/14/2020    Procedure: RELEASE, TRIGGER FINGER - right;  Surgeon: Rad Swift MD;  Location: [facility];  Service: Orthopedics;  Laterality: Right;    trus/bx      ULTRASOUND GUIDANCE  2/14/2020    Procedure: Ultrasound Guidance;  Surgeon: Miguel Angel Brady MD;  Location: [facility] CATH LAB;  Service: Cardiology;;     Family History   Problem Relation Age of Onset    Skin cancer Father     Lung cancer Father     Cancer Father         smoker,     Alzheimer's disease Mother     Hypertension Mother     Cancer Sister         colon, lung cancer     No Known Problems Sister     Cancer Brother         skin cancer, polypectomy     " Peripheral vascular disease Unknown     No Known Problems Maternal Aunt     No Known Problems Maternal Uncle     No Known Problems Paternal Aunt     No Known Problems Paternal Uncle     No Known Problems Maternal Grandmother     No Known Problems Maternal Grandfather     No Known Problems Paternal Grandmother     No Known Problems Paternal Grandfather     Melanoma Neg Hx     Psoriasis Neg Hx     Lupus Neg Hx     Eczema Neg Hx     Amblyopia Neg Hx     Blindness Neg Hx     Cataracts Neg Hx     Diabetes Neg Hx     Glaucoma Neg Hx     Macular degeneration Neg Hx     Retinal detachment Neg Hx     Strabismus Neg Hx     Stroke Neg Hx     Thyroid disease Neg Hx     Acne Neg Hx      Social History     Tobacco Use    Smoking status: Never Smoker    Smokeless tobacco: Never Used   Substance Use Topics    Alcohol use: Yes     Alcohol/week: 2.0 standard drinks     Types: 2 Cans of beer per week     Comment: occasionally, beer    Drug use: Never     Review of Systems   Constitutional: Negative for diaphoresis and fever.   HENT: Negative for sore throat.    Eyes: Negative for pain.   Respiratory: Negative for shortness of breath.    Cardiovascular: Negative for chest pain.   Gastrointestinal: Negative for abdominal pain and nausea.   Genitourinary: Negative for dysuria.   Musculoskeletal: Positive for myalgias (LLE). Negative for back pain.   Skin: Negative for rash.   Neurological: Negative for weakness.       Physical Exam     Initial Vitals [06/26/22 1525]   BP Pulse Resp Temp SpO2   (!) 142/63 69 18 97.6 °F (36.4 °C) 96 %      MAP       --         Physical Exam    Nursing note and vitals reviewed.  Constitutional: He is not diaphoretic. No distress.   HENT:   Head: Normocephalic and atraumatic.   Mouth/Throat: Oropharynx is clear and moist.   Eyes: Conjunctivae and EOM are normal. No scleral icterus.   Neck: Neck supple. No tracheal deviation present.   Normal range of motion.  Cardiovascular: Normal  rate, regular rhythm and intact distal pulses.   Pulmonary/Chest: No stridor. No respiratory distress. He has no wheezes. He has no rhonchi.   Abdominal: Abdomen is soft. He exhibits no distension. There is no abdominal tenderness.   Musculoskeletal:         General: No tenderness or edema. Normal range of motion.      Cervical back: Normal range of motion and neck supple.      Comments: Bony prominence to the left distal fibula with no tenderness, fluctuance or wound. No lower extremity edema.      Neurological: He is alert. He has normal strength. No cranial nerve deficit or sensory deficit. GCS score is 15. GCS eye subscore is 4. GCS verbal subscore is 5. GCS motor subscore is 6.   Skin: Skin is warm and dry.   Psychiatric: He has a normal mood and affect.         ED Course   Procedures  Labs Reviewed - No data to display       Imaging Results          X-Ray Tibia Fibula 2 View Left (Final result)  Result time 06/26/22 16:07:59    Final result by Jassi Hankins MD (06/26/22 16:07:59)                 Impression:      Postoperative changes as above.    No evidence of acute fracture or dislocation.      Electronically signed by: Jassi Hankins MD  Date:    06/26/2022  Time:    16:07             Narrative:    EXAMINATION:  XR TIBIA FIBULA 2 VIEW LEFT    CLINICAL HISTORY:  Pain in leg, unspecified    TECHNIQUE:  AP and lateral views of the left tibia and fibula were performed.    COMPARISON:  03/15/2017.    FINDINGS:  There has been revision of the previous arthrodesis involving the left tibia.  There is a lateral buttressing plate along with screw fixations involving the left tibia.  The partially visualized hardware of total left knee arthroplasty is within normal limits.    There is a healed fracture of the left distal fibula with some component of offset.  There is fusion across the left distal tibia-fibula syndesmosis with significant heterotopic bone formation.  There is chronic periostitis of the left proximal  fibula.    There is no evidence of acute fracture of dislocation.                                 Medications - No data to display  Medical Decision Making:   History:   Old Medical Records: I decided to obtain old medical records.  Differential Diagnosis:   This includes, but is not limited to: sprain, strain, fracture, dislocation, hardware complication.  Clinical Tests:   Radiological Study: Ordered and Reviewed          Scribe Attestation:   Scribe #1: I performed the above scribed service and the documentation accurately describes the services I performed. I attest to the accuracy of the note.       I, Lexie Bedolla , personally performed the services described in this documentation. All medical record entries made by the scribe were at my direction and in my presence. I have reviewed the chart and agree that the record reflects my personal performance and is accurate and complete.  ED Course as of 06/26/22 1716   Sun Jun 26, 2022   1657 The patient is afebrile and no acute process on history and physical.  There is no erythema, edema, tenderness to the left tibia/fibula.  X-ray is without hardware failure or loosening.  The area where the patient complained of having felt pain and is concerned about being able to feel a screw corresponds to the location on the x-ray where there is some heterotopic bone formation.  Patient reports that he does not currently have pain and is able to ambulate without difficulty.  He remains clinically stable in the emergency department does not appear to require emergent intervention.  He is fit for discharge to follow up with his operating surgeon as well as primary physician. counseled on supportive care, appropriate medication usage, concerning symptoms for which to return to ER and the importance of follow up. Understanding and agreement with treatment plan was expressed.  [SG]      ED Course User Index  [SG] Lexie Bedolla MD        This chart was completed using  dictation software, as a result there may be some transcription errors.         Clinical Impression:   Final diagnoses:  [M79.606] Leg pain  [Z87.81] History of tibial fracture (Primary)          ED Disposition Condition    Discharge Stable        ED Prescriptions     None        Follow-up Information     Follow up With Specialties Details Why Contact Info    Miguelina Weathers MD Internal Medicine, Pediatrics Schedule an appointment as soon as possible for a visit   [address] [facility] [address]  [phone]      your surgeon  Schedule an appointment as soon as possible for a visit              Lexie Bedolla MD  06/26/22 [id number]

## 2022-06-26 NOTE — DISCHARGE INSTRUCTIONS
X-rays do not show hardware complication or fracture.  Urine symptoms were likely related to bone healing at the area of the previous fracture.  Follow-up with your operating surgeon.  Return to the emergency department for any new, worsening or significantly concerning symptoms.    Thank you for coming to our Emergency Department today. It is important to remember that some problems are difficult to diagnose and may not be found during your first visit. Be sure to follow up with your primary care doctor and review any labs/imaging that was performed with them. If you do not have a primary care doctor, you may contact the one listed on your discharge paperwork or you may also call the [facility] Appointment Desk at [phone] to schedule an appointment with one.     All medications may potentially have side effects and it is impossible to predict which medications may give you side effects. If you feel that you are having a negative effect of any medication you should immediately stop taking them and seek medical attention.    Return to the ER with any questions/concerns, new/concerning symptoms, worsening or failure to improve. Do not drive or make any important decisions for 24 hours if you have received any pain medications, sedatives or mood altering drugs during your ER visit.

## 2022-06-29 NOTE — ASSESSMENT & PLAN NOTE
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 Currently receiving chemoradiation for MOHAN+.  Currently experiencing sore throat and constipation.  -reviewed with him he may take miralax QID PRN for constipation and can contact me if he needs something stronger  -referral to gen surg for peg tube placement (he is on apixaban and will need to hold prior to procedure)  -not a candidate for cisplatin with his renal dysfunction  -s/p port placement  -labs on Fridays weekly at the [facility]  -Follow up with me in 2 weeks

## 2022-06-29 NOTE — PROGRESS NOTES
PATIENT: Janusz Montgomery Jr.  MRN: [id number]  DATE: 6/29/2022      Diagnosis:   1. Squamous cell carcinoma of overlapping sites of skin    2. Head and neck cancer    3. Secondary malignant neoplasm of cervical lymph node        Chief Complaint: No chief complaint on file.      Oncologic History:    Oncologic History 1. Cutaneous squamous cell carcinoma  2. Cutaneous basal cell carcinoma  3. 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 Multiple shave biopsies  3. 5/5/22 radical neck dissection  4. 6/20/22 chemoradiation with carboplatin (cisplatin ineligible)    Pathology         Subjective:    Interval History: Mr. Montgomery is here for follow up.    77 year old man with multiple co-morbidities is here for follow up of squamous cell carcinoma of the right neck.  3/31/22 presented to ENT with painful right neck swelling. 4/12/22 CT neck showed multiple enlarged level 2 lymph nodes.  4/13/22 FNA revealed squamous cell carcinoma.  Case reviewed at head neck tumor board with recommendations for DL and neck dissection.  5/5/22 underwent DL (did not see other lesions) and right neck dissection, final pathology 7.5 cm lymph node, p16 (weakly positive) squamous cell carcinoma with MOHAN.  With his history of cutaneous squamous cell carcinoma, origin is thought to be cutaneous vs oropharyngeal.  He was seen by Dr. Moser ([facility] [place] radiation oncology) and started chemoradiation 6/20/22.  --  No immediate symptoms after chemotherapy; no worsening of his numbness or hearing.  No specific nausea, vomiting, or diarrhea.  He is having constipation and has not had a bowel movement in almost a week.  He has been having sore throat since starting radiation and is interested in getting a peg tube.    He is healing well post-operatively with residual post-surgical numbness in the area.  Denies any bleeding or discharge from the surgical incision sites.    He has baseline hearing deficit and uses hearing  "aids.  He reports difficult peripheral IV access (veins roll). He has a port.  He is alone at this telemedicine visit.    Past Medical History:   Past Medical History:   Diagnosis Date    (HFpEF) heart failure with preserved ejection fraction 02/04/2022    NAT (acute kidney injury) 03/19/2017    ALLERGIC RHINITIS     Anemia     Anxiety     Basal cell carcinoma 10/19/2018    forehead and right medial shoulder    Basal cell carcinoma 01/09/2019    left nasal bridge and left posterior ear    Basal cell carcinoma 06/12/2020    left lower medial leg - Efudex    Basal cell carcinoma 11/30/2020    left medial upper eyelid canthus superficial BCC    Chronic rhinitis 05/03/2013    Chronic rhinitis 05/03/2013    Coronary artery disease involving native coronary artery of native heart without angina pectoris s/p RCA stent     Cortical cataract of both eyes 03/18/2016    Delayed sleep phase syndrome 03/13/2019    Depression     Erectile dysfunction 03/24/2014    Erectile dysfunction 03/24/2014    Essential hypertension     GERD (gastroesophageal reflux disease) 07/25/2012    Gout, arthritis     Grade III open fracture of left tibia and fibula s/p ex-fix on 7/1/16 and ORIF of left tibia on 7/15 07/06/2016    H/O: iritis     Helicobacter pylori (H. pylori) infection     Treated    Hepatitis     Herpes simplex keratoconjunctivitis 09/30/2015    - on acyclovir - followed by opthalmology, Dr. Uribe     Herpes simplex keratoconjunctivitis 09/30/2015    - on acyclovir - followed by opthalmology, Dr. Uribe     Hyperkalemia 02/28/2017    Hyperlipidemia     Hypogonadism male     Hypogonadism male     Keloid cicatrix     Mixed anxiety and depressive disorder     Morbid obesity     Obstructive sleep apnea on CPAP     CPAP    Osteoarthritis of left knee 07/25/2012    Paroxysmal atrial fibrillation 07/06/2016    Primary osteoarthritis of left knee 07/25/2012    Prominent aorta 01/25/2016    "RESULTS: " "THE HEART IS MILDLY ENLARGED WITH A SLIGHTLY PROMINENT AORTA" - Xray Chest PA & Lateral 12-     Prostate cancer 02/15/2016    - followed by urology, Dr. Young     Prostate cancer 02/15/2016    - followed by urology, Dr. 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    chest and forehead    Squamous cell cancer of skin of nose     Traumatic type III open fracture of shaft of left tibia and fibula with nonunion 07/06/2016    Type III open fracture of left tibia and fibula with routine healing 07/06/2016    Vitamin D deficiency disease     Vitreous detachment 03/18/2016       Past Surgical HIstory:   Past Surgical History:   Procedure Laterality Date    ADJACENT TISSUE TRANSFER Left 11/30/2020    Procedure: ADJACENT TISSUE TRANSFER x2 ;  Surgeon: Teresa Cooper MD;  [address]  Service: Ophthalmology;  Laterality: Left;  Left glabellar 8.5x11x3 and Left upper eyelid advancement flap [id number]      Cardiac stenting x2      CATARACT EXTRACTION W/  INTRAOCULAR LENS IMPLANT Right 3/29/2016    Dr. Conteh    CATARACT EXTRACTION W/  INTRAOCULAR LENS IMPLANT Left 4/12/2016        COLONOSCOPY N/A 7/23/2020    Procedure: COLONOSCOPY;  Surgeon: Nico Lackey MD;  Location: [address]);  Service: Endoscopy;  Laterality: N/A;    DIRECT LARYNGOSCOPY N/A 5/4/2022    Procedure: LARYNGOSCOPY, DIRECT possible biopsy;  Surgeon: Etta Higgins MD;  Location: [facility];  Service: ENT;  Laterality: N/A;  RN PREOP ON 4/29/22. NEEDS T&S DAY OF SURGERY--NF  CONSENTS DAY OF SURGERY    DISSECTION OF NECK Right 5/4/2022    Procedure: RADICAL NECK DISSECTION;  Surgeon: Etta" "MD Ronaldo;  Location: [facility] OR;  Service: ENT;  Laterality: Right;  TO FOLLOW DR ALMAZAN    ESOPHAGOGASTRODUODENOSCOPY N/A 7/23/2020    Procedure: EGD (ESOPHAGOGASTRODUODENOSCOPY);  Surgeon: Nico Lackey MD;  Location: [facility] (2ND FLR);  Service: Endoscopy;  Laterality: N/A;  per Dr Lackey-Will proceed with EGD and colonoscopy on the 2nd floor due to his comorbidities including obesity, sleep apnea, restrictive lung disease, coronary artery disease.  has loop recorder      ok to hold Eliquis 2 days per Dr Sandhu-must remain    EXTERNAL FIXATION TIBIAL FRACTURE Left 07/01/2016    INSERTION OF IMPLANTABLE LOOP RECORDER  04/07/2017    LEFT HEART CATHETERIZATION Left 1/30/2020    Procedure: Left heart cath;  Surgeon: Benjamin Sandhu MD;  Location: [facility] CATH LAB;  Service: Cardiology;  Laterality: Left;  RN PREOP 1/28/20--Pt starting Plavix loading dose today (8pills)- Dr. Sandhu aware.  Pt has a bandaged "non healing area to LLE"--Dr. Sandhu aware.    LEFT HEART CATHETERIZATION Left 2/14/2020    Procedure: Left heart cath, IVUS guided left main / LAD PCI. Noon start, radial approach;  Surgeon: Miguel Angel Brady MD;  Location: [facility] CATH LAB;  Service: Cardiology;  Laterality: Left;  RN PRE OP 2-7-2020  BMI--45.61    ORIF TIBIA FRACTURE Left 07/15/2016    PROBING OF NASOLACRIMAL DUCT WITH INSERTION OF TUBE Left 11/30/2020    Procedure: PROBING, NASOLACRIMAL DUCT,;  Surgeon: Teresa Cooper MD;  Location: [facility] OR 1ST FLR;  Service: Ophthalmology;  Laterality: Left;    RADIOACTIVE SEED IMPLANTATION OF PROSTATE N/A 8/8/2018    Procedure: INSERTION, RADIOACTIVE SEED, PROSTATE;  Surgeon: Bipin Thompson MD;  [facility];  Service: Urology;  Laterality: N/A;  1 hour    SKIN BIOPSY      Squamous cell cancer removal x3 with Mohs surgery      TONSILLECTOMY      TOTAL KNEE ARTHROPLASTY  10/2012    TRIGGER FINGER RELEASE Right 10/14/2020    Procedure: RELEASE, TRIGGER FINGER - right;  Surgeon: Rad" MARKIE Swift MD;  Location: [facility];  Service: Orthopedics;  Laterality: Right;    trus/bx      ULTRASOUND GUIDANCE  2/14/2020    Procedure: Ultrasound Guidance;  Surgeon: Miguel Angel Brady MD;  Location: [facility] CATH LAB;  Service: Cardiology;;       Family History:   Family History   Problem Relation Age of Onset    Skin cancer Father     Lung cancer Father     Cancer Father         smoker,     Alzheimer's disease Mother     Hypertension Mother     Cancer Sister         colon, lung cancer     No Known Problems Sister     Cancer Brother         skin cancer, polypectomy     Peripheral vascular disease Unknown     No Known Problems Maternal Aunt     No Known Problems Maternal Uncle     No Known Problems Paternal Aunt     No Known Problems Paternal Uncle     No Known Problems Maternal Grandmother     No Known Problems Maternal Grandfather     No Known Problems Paternal Grandmother     No Known Problems Paternal Grandfather     Melanoma Neg Hx     Psoriasis Neg Hx     Lupus Neg Hx     Eczema Neg Hx     Amblyopia Neg Hx     Blindness Neg Hx     Cataracts Neg Hx     Diabetes Neg Hx     Glaucoma Neg Hx     Macular degeneration Neg Hx     Retinal detachment Neg Hx     Strabismus Neg Hx     Stroke Neg Hx     Thyroid disease Neg Hx     Acne Neg Hx        Social History:  reports that he has never smoked. He has never used smokeless tobacco. He reports current alcohol use of about 2.0 standard drinks of alcohol per week. He reports that he does not use drugs.    Allergies:  Review of patient's allergies indicates:   Allergen Reactions    Ciprofloxacin Rash     Diffuse pruritic morbilliform rash developed 3/15/2017 after dose of cipro; previously in 2/2017 he had rash/fevers after initiation of cipro    Zosyn [piperacillin-tazobactam] Rash     Diffuse pruritic morbilliform rash developed 3/15/2017.  Then, 430am dose on 3/16 and rash worsened with SOB/tachypnea but no hypoxemia.     Bacitracin  Itching and Rash     Violaceous rash in area of topical Tx.        Medications:  Current Outpatient Medications   Medication Sig Dispense Refill    acyclovir (ZOVIRAX) 800 MG Tab Take 200 mg by mouth 2 (two) times daily.      albuterol-ipratropium (DUO-NEB) 2.5 mg-0.5 mg/3 mL nebulizer solution Take 3 mLs by nebulization every 6 (six) hours as needed for Wheezing. Rescue 75 mL 0    amiodarone (PACERONE) 200 MG Tab Take 1 tablet (200 mg total) by mouth once daily. 90 tablet 3    artificial tears (ISOPTO TEARS) 0.5 % ophthalmic solution Place 1 drop into both eyes as needed.      atorvastatin (LIPITOR) 40 MG tablet Take 1 tablet (40 mg total) by mouth once daily. (Patient taking differently: Take 40 mg by mouth every evening.) 90 tablet 1    clopidogreL (PLAVIX) 75 mg tablet Take 1 tablet by mouth once daily (Patient taking differently: Take by mouth once daily.) 90 tablet 3    dexAMETHasone (DECADRON) 4 MG Tab 4mg (1 tablet) by mouth twice a day for two days starting the day after chemo.  Repeat after each dose of chemo. 24 tablet 1    ELIQUIS 5 mg Tab Take 1 tablet by mouth twice daily 180 tablet 3    fluticasone propionate (FLONASE) 50 mcg/actuation nasal spray 1 spray (50 mcg total) by Each Nostril route once daily. 1 Bottle 0    furosemide (LASIX) 20 MG tablet Take 1 tablet (20 mg total) by mouth once daily. (Patient taking differently: Take 20 mg by mouth every morning.) 90 tablet 3    gabapentin (NEURONTIN) 300 MG capsule Take 1 capsule (300 mg total) by mouth 3 (three) times daily. (Patient taking differently: Take 300 mg by mouth 3 (three) times daily as needed.) 270 capsule 1    LIDOcaine-prilocaine (EMLA) cream Apply generously to port site 30-60 min prior to chemo and then cover with saran wrap. 30 g 2    melatonin 5 mg Tab Take 10 mg by mouth nightly.      metoprolol succinate (TOPROL-XL) 25 MG 24 hr tablet Take 1 tablet by mouth once daily (Patient taking differently: Take by mouth every  morning.) 90 tablet 3    nitroGLYCERIN (NITROSTAT) 0.4 MG SL tablet Place 1 tablet (0.4 mg total) under the tongue every 5 (five) minutes as needed for Chest pain. 25 tablet 11    ondansetron (ZOFRAN) 8 MG tablet Take 1 tablet (8 mg total) by mouth every 8 (eight) hours as needed for Nausea. 30 tablet 3    oxybutynin (DITROPAN-XL) 5 MG TR24 Take 1 tablet (5 mg total) by mouth once daily. (Patient taking differently: Take 5 mg by mouth every evening.) 30 tablet 11    oxyCODONE-acetaminophen (PERCOCET) 5-325 mg per tablet Take 2 tablets by mouth every 6 (six) hours as needed for Pain. 25 tablet 0    pantoprazole (PROTONIX) 40 MG tablet Take 1 tablet (40 mg total) by mouth once daily. 90 tablet 3    sildenafiL (VIAGRA) 100 MG tablet Take 1 tablet (100 mg total) by mouth daily as needed. 10 tablet 11    tamsulosin (FLOMAX) 0.4 mg Cap Take 1 capsule (0.4 mg total) by mouth once daily. (Patient taking differently: Take 1 capsule by mouth every evening.) 90 capsule 3    traZODone (DESYREL) 50 MG tablet Take 1 tablet (50 mg total) by mouth nightly as needed for Insomnia. 30 tablet 2    venlafaxine (EFFEXOR) 75 MG tablet Take 2 tablets (150 mg total) by mouth 2 (two) times daily. (Patient taking differently: Take 75 mg by mouth every morning.) 360 tablet 0    vitamin D 1000 units Tab Take 1 tablet (1,000 Units total) by mouth once daily. (Patient taking differently: Take 2,000 Units by mouth once daily.)       No current facility-administered medications for this visit.     Facility-Administered Medications Ordered in Other Visits   Medication Dose Route Frequency Provider Last Rate Last Admin    heparin, porcine (PF) 100 unit/mL injection flush 500 Units  500 Units Intravenous PRN Robert Hargrove MD   500 Units at 06/20/22 1530    sodium chloride 0.9% flush 10 mL  10 mL Intravenous PRN Robert Hargrove MD   10 mL at 06/20/22 1530       Review of Systems   Constitutional: Negative for activity change, appetite change,  chills, diaphoresis, fatigue, fever and unexpected weight change.   HENT: Positive for hearing loss and sore throat. Negative for nosebleeds and trouble swallowing.    Eyes: Negative for visual disturbance.   Respiratory: Positive for shortness of breath. Negative for cough, chest tightness and wheezing.    Cardiovascular: Negative for chest pain and leg swelling.   Gastrointestinal: Positive for constipation. Negative for abdominal distention, abdominal pain, blood in stool, diarrhea, nausea and vomiting.   Endocrine: Negative for cold intolerance and heat intolerance.   Genitourinary: Negative for difficulty urinating and dysuria.   Musculoskeletal: Negative for arthralgias and back pain.   Skin: Negative for color change.   Neurological: Positive for weakness and numbness. Negative for dizziness, light-headedness and headaches.   Hematological: Negative for adenopathy. Does not bruise/bleed easily.   Psychiatric/Behavioral: Negative for confusion.       ECOG Performance Status:     ECOG SCORE    1 - Restricted in strenuous activity-ambulatory and able to carry out work of a light nature         Objective:      Vitals:   There were no vitals filed for this visit.  BMI: There is no height or weight on file to calculate BMI.virtual visit    Physical Exam  Constitutional:       General: He is not in acute distress.     Appearance: He is obese. He is not ill-appearing.   HENT:      Head: Normocephalic and atraumatic.      Nose: Nose normal.      Mouth/Throat:      Mouth: Mucous membranes are moist.      Pharynx: Oropharynx is clear. No oropharyngeal exudate or posterior oropharyngeal erythema.   Eyes:      General: No scleral icterus.     Extraocular Movements: Extraocular movements intact.      Conjunctiva/sclera: Conjunctivae normal.      Pupils: Pupils are equal, round, and reactive to light.   Pulmonary:      Effort: Pulmonary effort is normal. No respiratory distress.   Musculoskeletal:         General: Normal  range of motion.      Cervical back: Normal range of motion.   Skin:     General: Skin is warm and dry.      Coloration: Skin is not jaundiced.   Neurological:      General: No focal deficit present.      Mental Status: He is alert.   Psychiatric:         Mood and Affect: Mood normal.         Laboratory Data:   BMP  Lab Results   Component Value Date     06/24/2022    K 3.8 06/24/2022     06/24/2022    CO2 29 06/24/2022    BUN 29 (H) 06/24/2022    CREATININE 1.7 (H) 06/24/2022    CALCIUM 8.6 (L) 06/24/2022    ANIONGAP 8 06/24/2022    ESTGFRAFRICA 45 (A) 06/24/2022    EGFRNONAA 39 (A) 06/24/2022     Lab Results   Component Value Date    WBC 14.14 (H) 06/24/2022    HGB 11.0 (L) 06/24/2022    HCT 32.6 (L) 06/24/2022     (H) 06/24/2022     06/24/2022           Imaging:         Assessment:       1. Squamous cell carcinoma of overlapping sites of skin    2. Head and neck cancer    3. Secondary malignant neoplasm of cervical lymph node           Plan:       Problem List Items Addressed This Visit        Oncology    Head and neck cancer    Overview     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  5/5/22 radical neck dissection level 2-5, sacrifice portion of SCM and accessory nerve due to tumor invasion.  DL was performed; no obvious lesion to biopsy.  Node at least 7 cm with MOHAN. p16 weak-moderate positive.  6/3/22 PET CT with hypermetabolic uptake at right neck (site of surgery). Nonspecific uptake in small bowel (without CT mass correlate), axial skeleton (likely hyperplastic marrow), and prostate (brachytherapy beads present).  6/20/22 started chemoradiation with carboplatin (cisplatin ineligible)           Current Assessment & Plan     Squamous cell carcinoma, oropharyngeal vs cutaneous origin. Currently receiving chemoradiation for MOHAN+.  Currently experiencing sore throat and constipation.  -reviewed with him he may take miralax QID PRN for constipation and can contact me if he  needs something stronger  -referral to gen surg for peg tube placement (he is on apixaban and will need to hold prior to procedure)  -not a candidate for cisplatin with his renal dysfunction  -s/p port placement  -labs on Fridays weekly at the [facility]  -Follow up with me in 2 weeks           Relevant Orders    Ambulatory referral/consult to General Surgery    Secondary malignant neoplasm of cervical lymph node    Squamous cell carcinoma of overlapping sites of skin - Primary    Overview     4/5/16 right chest shave biopsy invasive squamous cell carcinoma, well differentiated, crateriform, keratocanthomatous type  6/8/21 Skin, right trap, shave biopsy: invasive squamous cell carcinoma  1/10/22 left lateral ankle shave biopsy squamous cell carcinoma in situ / bowen's disease           Relevant Orders    Ambulatory referral/consult to General Surgery          Orders Placed This Encounter   Procedures    Ambulatory referral/consult to General Surgery         Advance Care Planning I initiated the process of advance care planning at his initial visit and explained the importance of this process to the patient.  Then the patient received detailed information about the importance of designating a Health Care Power of  (HCPOA). he was instructed to communicate with this person about their wishes for future healthcare, should he become sick and lose decision-making capacity. The patient has not previously appointed a HCPOA. After our discussion, the patient has decided to complete a HCPOA and has appointed his daughter        Robert Hargrove MD  Hematology Oncology      The patient location is: home  The chief complaint leading to consultation is: head neck cancer    Visit type: audiovisual    Face to Face time with patient: 10 minutes  30 minutes of total time spent on the encounter, which includes face to face time and non-face to face time preparing to see the patient (eg, review of tests), Obtaining and/or reviewing  separately obtained history, Documenting clinical information in the electronic or other health record, Independently interpreting results (not separately reported) and communicating results to the patient/family/caregiver, or Care coordination (not separately reported).         Each patient to whom he or she provides medical services by telemedicine is:  (1) informed of the relationship between the physician and patient and the respective role of any other health care provider with respect to management of the patient; and (2) notified that he or she may decline to receive medical services by telemedicine and may withdraw from such care at any time.    Notes:

## 2022-06-30 NOTE — PROGRESS NOTES
History and Physical Exam    Patient ID: Janusz Montgomery Jr. is a 77 y.o. male.    Chief Complaint: Consult (Consult peg tube )      HPI:  76 y/o man with history of head and neck cancer needs PEG tube      Review of Systems   Constitutional: Positive for fatigue. Negative for chills and unexpected weight change.   HENT: Negative for congestion, ear pain and sore throat.    Eyes: Negative for pain and redness.   Respiratory: Positive for shortness of breath. Negative for cough.    Cardiovascular: Negative for chest pain and palpitations.   Gastrointestinal: Negative for abdominal distention, abdominal pain and constipation.   Endocrine: Negative for cold intolerance and heat intolerance.   Genitourinary: Negative for dysuria and frequency.   Musculoskeletal: Negative for back pain and neck pain.   Skin: Negative for pallor and rash.   Neurological: Negative for syncope, light-headedness and headaches.   Hematological: Negative for adenopathy. Does not bruise/bleed easily.   Psychiatric/Behavioral: Negative for confusion and hallucinations.       Current Outpatient Medications   Medication Sig Dispense Refill    acyclovir (ZOVIRAX) 800 MG Tab Take 200 mg by mouth 2 (two) times daily.      amiodarone (PACERONE) 200 MG Tab Take 1 tablet (200 mg total) by mouth once daily. 90 tablet 3    artificial tears (ISOPTO TEARS) 0.5 % ophthalmic solution Place 1 drop into both eyes as needed.      atorvastatin (LIPITOR) 40 MG tablet Take 1 tablet (40 mg total) by mouth once daily. (Patient taking differently: Take 40 mg by mouth every evening.) 90 tablet 1    clopidogreL (PLAVIX) 75 mg tablet Take 1 tablet by mouth once daily (Patient taking differently: Take by mouth once daily.) 90 tablet 3    dexAMETHasone (DECADRON) 4 MG Tab 4mg (1 tablet) by mouth twice a day for two days starting the day after chemo.  Repeat after each dose of chemo. 24 tablet 1    ELIQUIS 5 mg Tab Take 1 tablet by mouth twice daily 180 tablet 3     furosemide (LASIX) 20 MG tablet Take 1 tablet (20 mg total) by mouth once daily. (Patient taking differently: Take 20 mg by mouth every morning.) 90 tablet 3    gabapentin (NEURONTIN) 300 MG capsule Take 1 capsule (300 mg total) by mouth 3 (three) times daily. (Patient taking differently: Take 300 mg by mouth 3 (three) times daily as needed.) 270 capsule 1    LIDOcaine-prilocaine (EMLA) cream Apply generously to port site 30-60 min prior to chemo and then cover with saran wrap. 30 g 2    melatonin 5 mg Tab Take 10 mg by mouth nightly.      metoprolol succinate (TOPROL-XL) 25 MG 24 hr tablet Take 1 tablet by mouth once daily (Patient taking differently: Take by mouth every morning.) 90 tablet 3    ondansetron (ZOFRAN) 8 MG tablet Take 1 tablet (8 mg total) by mouth every 8 (eight) hours as needed for Nausea. 30 tablet 3    oxybutynin (DITROPAN-XL) 5 MG TR24 Take 1 tablet (5 mg total) by mouth once daily. (Patient taking differently: Take 5 mg by mouth every evening.) 30 tablet 11    oxyCODONE-acetaminophen (PERCOCET) 5-325 mg per tablet Take 2 tablets by mouth every 6 (six) hours as needed for Pain. 25 tablet 0    pantoprazole (PROTONIX) 40 MG tablet Take 1 tablet (40 mg total) by mouth once daily. 90 tablet 3    sildenafiL (VIAGRA) 100 MG tablet Take 1 tablet (100 mg total) by mouth daily as needed. 10 tablet 11    tamsulosin (FLOMAX) 0.4 mg Cap Take 1 capsule (0.4 mg total) by mouth once daily. (Patient taking differently: Take 1 capsule by mouth every evening.) 90 capsule 3    traZODone (DESYREL) 50 MG tablet Take 1 tablet (50 mg total) by mouth nightly as needed for Insomnia. 30 tablet 2    venlafaxine (EFFEXOR) 75 MG tablet Take 2 tablets (150 mg total) by mouth 2 (two) times daily. (Patient taking differently: Take 75 mg by mouth every morning.) 360 tablet 0    vitamin D 1000 units Tab Take 1 tablet (1,000 Units total) by mouth once daily. (Patient taking differently: Take 2,000 Units by mouth once  daily.)      nitroGLYCERIN (NITROSTAT) 0.4 MG SL tablet Place 1 tablet (0.4 mg total) under the tongue every 5 (five) minutes as needed for Chest pain. 25 tablet 11     No current facility-administered medications for this visit.     Facility-Administered Medications Ordered in Other Visits   Medication Dose Route Frequency Provider Last Rate Last Admin    heparin, porcine (PF) 100 unit/mL injection flush 500 Units  500 Units Intravenous PRN Robert Hargrove MD   500 Units at 06/20/22 1530    sodium chloride 0.9% flush 10 mL  10 mL Intravenous PRN Robert Hargrove MD   10 mL at 06/20/22 1530       Review of patient's allergies indicates:   Allergen Reactions    Ciprofloxacin Rash     Diffuse pruritic morbilliform rash developed 3/15/2017 after dose of cipro; previously in 2/2017 he had rash/fevers after initiation of cipro    Zosyn [piperacillin-tazobactam] Rash     Diffuse pruritic morbilliform rash developed 3/15/2017.  Then, 430am dose on 3/16 and rash worsened with SOB/tachypnea but no hypoxemia.     Bacitracin Itching and Rash     Violaceous rash in area of topical Tx.        Past Medical History:   Diagnosis Date    (HFpEF) heart failure with preserved ejection fraction 02/04/2022    NAT (acute kidney injury) 03/19/2017    ALLERGIC RHINITIS     Anemia     Anxiety     Basal cell carcinoma 10/19/2018    forehead and right medial shoulder    Basal cell carcinoma 01/09/2019    left nasal bridge and left posterior ear    Basal cell carcinoma 06/12/2020    left lower medial leg - Efudex    Basal cell carcinoma 11/30/2020    left medial upper eyelid canthus superficial BCC    Chronic rhinitis 05/03/2013    Chronic rhinitis 05/03/2013    Coronary artery disease involving native coronary artery of native heart without angina pectoris s/p RCA stent     Cortical cataract of both eyes 03/18/2016    Delayed sleep phase syndrome 03/13/2019    Depression     Erectile dysfunction 03/24/2014    Erectile  "dysfunction 03/24/2014    Essential hypertension     GERD (gastroesophageal reflux disease) 07/25/2012    Gout, arthritis     Grade III open fracture of left tibia and fibula s/p ex-fix on 7/1/16 and ORIF of left tibia on 7/15 07/06/2016    H/O: iritis     Helicobacter pylori (H. pylori) infection     Treated    Hepatitis     Herpes simplex keratoconjunctivitis 09/30/2015    - on acyclovir - followed by opthalmology, Dr. Uribe     Herpes simplex keratoconjunctivitis 09/30/2015    - on acyclovir - followed by opthalmology, Dr. Uribe     Hyperkalemia 02/28/2017    Hyperlipidemia     Hypogonadism male     Hypogonadism male     Keloid cicatrix     Mixed anxiety and depressive disorder     Morbid obesity     Obstructive sleep apnea on CPAP     CPAP    Osteoarthritis of left knee 07/25/2012    Paroxysmal atrial fibrillation 07/06/2016    Primary osteoarthritis of left knee 07/25/2012    Prominent aorta 01/25/2016    "RESULTS: THE HEART IS MILDLY ENLARGED WITH A SLIGHTLY PROMINENT AORTA" - Xray Chest PA & Lateral 12-     Prostate cancer 02/15/2016    - followed by urology, Dr. Young     Prostate cancer 02/15/2016    - followed by urology, Dr. 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    chest and forehead    Squamous cell cancer of skin of nose     Traumatic type III open fracture of shaft of left tibia and fibula with nonunion 07/06/2016    Type III open fracture of left tibia and fibula with routine healing 07/06/2016    Vitamin D deficiency disease     Vitreous detachment 03/18/2016       Past Surgical History:   Procedure Laterality Date    ADJACENT TISSUE TRANSFER Left " "11/30/2020    Procedure: ADJACENT TISSUE TRANSFER x2 ;  Surgeon: Teresa Cooper MD;  Location: [facility] 1ST FLR;  Service: Ophthalmology;  Laterality: Left;  Left glabellar 8.5x11x3 and Left upper eyelid advancement flap [id number]      Cardiac stenting x2      CATARACT EXTRACTION W/  INTRAOCULAR LENS IMPLANT Right 3/29/2016    Dr. Conteh    CATARACT EXTRACTION W/  INTRAOCULAR LENS IMPLANT Left 4/12/2016        COLONOSCOPY N/A 7/23/2020    Procedure: COLONOSCOPY;  Surgeon: Nico Lackey MD;  Location: [facility] (2ND FLR);  Service: Endoscopy;  Laterality: N/A;    DIRECT LARYNGOSCOPY N/A 5/4/2022    Procedure: LARYNGOSCOPY, DIRECT possible biopsy;  Surgeon: Etta Higgins MD;  Location: [facility] OR;  Service: ENT;  Laterality: N/A;  RN PREOP ON 4/29/22. NEEDS T&S DAY OF SURGERY--NF  CONSENTS DAY OF SURGERY    DISSECTION OF NECK Right 5/4/2022    Procedure: RADICAL NECK DISSECTION;  Surgeon: Etta Higgins MD;  Location: [facility] OR;  Service: ENT;  Laterality: Right;  TO FOLLOW DR ALMAZAN    ESOPHAGOGASTRODUODENOSCOPY N/A 7/23/2020    Procedure: EGD (ESOPHAGOGASTRODUODENOSCOPY);  Surgeon: Nico Lackey MD;  Location: [facility] (2ND FLR);  Service: Endoscopy;  Laterality: N/A;  per Dr Lackey-Will proceed with EGD and colonoscopy on the 2nd floor due to his comorbidities including obesity, sleep apnea, restrictive lung disease, coronary artery disease.  has loop recorder      ok to hold Eliquis 2 days per Dr Sandhu-must remain    EXTERNAL FIXATION TIBIAL FRACTURE Left 07/01/2016    INSERTION OF IMPLANTABLE LOOP RECORDER  04/07/2017    LEFT HEART CATHETERIZATION Left 1/30/2020    Procedure: Left heart cath;  Surgeon: Benjamin Sandhu MD;  Location: [facility] CATH LAB;  Service: Cardiology;  Laterality: Left;  RN PREOP 1/28/20--Pt starting Plavix loading dose today (8pills)- Dr. Sandhu aware.  Pt has a bandaged "non healing area to LLE"--Dr. Sandhu aware.    LEFT HEART CATHETERIZATION Left " 2/14/2020    Procedure: Left heart cath, IVUS guided left main / LAD PCI. Noon start, radial approach;  Surgeon: Miguel Angel Brady MD;  Location: [facility] CATH LAB;  Service: Cardiology;  Laterality: Left;  RN PRE OP 2-7-2020  BMI--45.61    ORIF TIBIA FRACTURE Left 07/15/2016    PROBING OF NASOLACRIMAL DUCT WITH INSERTION OF TUBE Left 11/30/2020    Procedure: PROBING, NASOLACRIMAL DUCT,;  Surgeon: Teresa Cooper MD;  Location: [facility] OR [address] [place] [address];  Service: Ophthalmology;  Laterality: Left;    RADIOACTIVE SEED IMPLANTATION OF PROSTATE N/A 8/8/2018    Procedure: INSERTION, RADIOACTIVE SEED, PROSTATE;  Surgeon: Bipin Thompson MD;  Location: [facility] OR [address] [place] [address];  Service: Urology;  Laterality: N/A;  1 hour    SKIN BIOPSY      Squamous cell cancer removal x3 with Mohs surgery      TONSILLECTOMY      TOTAL KNEE ARTHROPLASTY  10/2012    TRIGGER FINGER RELEASE Right 10/14/2020    Procedure: RELEASE, TRIGGER FINGER - right;  Surgeon: Rad Swift MD;  Location: [facility];  Service: Orthopedics;  Laterality: Right;    trus/bx      ULTRASOUND GUIDANCE  2/14/2020    Procedure: Ultrasound Guidance;  Surgeon: Miguel Angel Brady MD;  Location: [facility] CATH LAB;  Service: Cardiology;;       Family History   Problem Relation Age of Onset    Skin cancer Father     Lung cancer Father     Cancer Father         smoker,     Alzheimer's disease Mother     Hypertension Mother     Cancer Sister         colon, lung cancer     No Known Problems Sister     Cancer Brother         skin cancer, polypectomy     Peripheral vascular disease Unknown     No Known Problems Maternal Aunt     No Known Problems Maternal Uncle     No Known Problems Paternal Aunt     No Known Problems Paternal Uncle     No Known Problems Maternal Grandmother     No Known Problems Maternal Grandfather     No Known Problems Paternal Grandmother     No Known Problems Paternal Grandfather     Melanoma Neg Hx     Psoriasis Neg Hx     Lupus Neg Hx     Eczema Neg Hx      Amblyopia Neg Hx     Blindness Neg Hx     Cataracts Neg Hx     Diabetes Neg Hx     Glaucoma Neg Hx     Macular degeneration Neg Hx     Retinal detachment Neg Hx     Strabismus Neg Hx     Stroke Neg Hx     Thyroid disease Neg Hx     Acne Neg Hx        Social History     Socioeconomic History    Marital status:    Occupational History    Occupation: Retired    Tobacco Use    Smoking status: Never Smoker    Smokeless tobacco: Never Used   Substance and Sexual Activity    Alcohol use: Yes     Alcohol/week: 2.0 standard drinks     Types: 2 Cans of beer per week     Comment: occasionally, beer    Drug use: Never    Sexual activity: Yes     Partners: Female     Birth control/protection: None     Social Determinants of Health     Financial Resource Strain: Low Risk     Difficulty of Paying Living Expenses: Not hard at all   Food Insecurity: No Food Insecurity    Worried About Running Out of Food in the Last Year: Never true    Ran Out of Food in the Last Year: Never true   Transportation Needs: No Transportation Needs    Lack of Transportation (Medical): No    Lack of Transportation (Non-Medical): No   Physical Activity: Inactive    Days of Exercise per Week: 0 days    Minutes of Exercise per Session: 0 min   Stress: No Stress Concern Present    Feeling of Stress : Only a little   Social Connections: Unknown    Frequency of Communication with Friends and Family: More than three times a week    Frequency of Social Gatherings with Friends and Family: Twice a week    Active Member of Clubs or Organizations: Yes    Attends Club or Organization Meetings: More than 4 times per year    Marital Status:    Housing Stability: Low Risk     Unable to Pay for Housing in the Last Year: No    Number of Places Lived in the Last Year: 1    Unstable Housing in the Last Year: No       Vitals:    06/30/22 1347   BP: (!) 140/71   Pulse: 67   Temp: 97.4 °F (36.3 °C)       Physical  Exam  Constitutional:       Appearance: He is well-developed. He is obese.   HENT:      Head: Normocephalic and atraumatic.   Eyes:      Pupils: Pupils are equal, round, and reactive to light.   Cardiovascular:      Rate and Rhythm: Normal rate and regular rhythm.      Heart sounds: No murmur heard.  Pulmonary:      Effort: Pulmonary effort is normal.      Breath sounds: Normal breath sounds. No wheezing.   Abdominal:      General: There is no distension.      Palpations: Abdomen is soft.      Tenderness: There is no abdominal tenderness.   Musculoskeletal:         General: Normal range of motion.      Cervical back: Normal range of motion and neck supple.   Skin:     General: Skin is warm and dry.      Capillary Refill: Capillary refill takes less than 2 seconds.      Findings: No rash.   Neurological:      Mental Status: He is alert and oriented to person, place, and time.   Psychiatric:         Judgment: Judgment normal.         Assessment & Plan:    head and neck cancer, needs peg tube.     Risks, benefits, alternatives to the procedure were discussed with the patient, who appears to understand and agree with the treatment plan.

## 2022-07-01 PROBLEM — J96.01 ACUTE HYPOXEMIC RESPIRATORY FAILURE: Status: RESOLVED | Noted: 2022-01-01 | Resolved: 2022-01-01

## 2022-07-01 PROBLEM — L03.90 CELLULITIS: Status: RESOLVED | Noted: 2017-03-15 | Resolved: 2022-01-01

## 2022-07-01 NOTE — PLAN OF CARE
Patient arrived to unit for labs from Port. Plan of care reviewed, patient agreeable to plan. Patient tolerated lab draw from port well. VSS. Discharge instructions reviewed, patient instructed to return 7/5. Patient left off unit accompanied by daughter. Patient in NAD at time of discharge.

## 2022-07-07 NOTE — TELEPHONE ENCOUNTER
----- Message from Bryce Serna sent at 7/7/2022 10:07 AM CDT -----  Type: Patient Call Back    Who called:self    What is the request in detail:Pt would like to cancel surgery for 7/13.    Can the clinic reply by [facility]?no    Would the patient rather a call back or a response via [facility]? call    Best call back number:[phone]

## 2022-07-08 PROBLEM — D84.821 IMMUNODEFICIENCY DUE TO DRUG THERAPY: Status: ACTIVE | Noted: 2022-01-01

## 2022-07-08 PROBLEM — Z79.899 IMMUNODEFICIENCY DUE TO DRUG THERAPY: Status: ACTIVE | Noted: 2022-01-01

## 2022-07-08 NOTE — PLAN OF CARE
Pt arrived to unit. Tolerated port flush with labs. Pt has next appts. Pt discharged from unit in wheelchair, accompanied by wife. No distress noted.

## 2022-07-08 NOTE — ASSESSMENT & PLAN NOTE
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 Currently receiving chemoradiation for MOHAN+.  Currently experiencing odynophagia, dysgeusia, and some dysphagia.  He is hesitant to get a peg tube.   -labs reviewed; ok to proceed with week 4  --renally adjust carboplatin  -sucralfate and magic mouthwash for sore throat  -hold on peg tube at this time  -not a candidate for cisplatin with his renal dysfunction  -s/p port placement  -[place] [facility] [place]  -Follow up with me in 3 weeks

## 2022-07-10 NOTE — ASSESSMENT & PLAN NOTE
Gradually worsening anemia as expected with carboplatin  -continue monitoring cbc  -transfuse for hg <7

## 2022-07-10 NOTE — PROGRESS NOTES
PATIENT: Janusz Montgomery Jr.  MRN: [id number]  DATE: 7/10/2022      Diagnosis:   1. Secondary malignant neoplasm of cervical lymph node    2. Head and neck cancer    3. Sensorineural hearing loss (SNHL) of both ears    4. Stage 3a chronic kidney disease    5. Immunodeficiency due to drug therapy    6. Macrocytic anemia        Chief Complaint: Head and Neck Cancer      Oncologic History:    Oncologic History 1. Cutaneous squamous cell carcinoma  2. Cutaneous basal cell carcinoma  3. 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 Multiple shave biopsies  3. 5/5/22 radical neck dissection  4. 6/20/22 chemoradiation with carboplatin (cisplatin ineligible)    Pathology         Subjective:    Interval History: Mr. Montgomery is here for follow up.    77 year old man with multiple co-morbidities is here for follow up of squamous cell carcinoma of the right neck.  3/31/22 presented to ENT with painful right neck swelling. 4/12/22 CT neck showed multiple enlarged level 2 lymph nodes.  4/13/22 FNA revealed squamous cell carcinoma.  Case reviewed at head neck tumor board with recommendations for DL and neck dissection.  5/5/22 underwent DL (did not see other lesions) and right neck dissection, final pathology 7.5 cm lymph node, p16 (weakly positive) squamous cell carcinoma with MOHAN.  With his history of cutaneous squamous cell carcinoma, origin is thought to be cutaneous vs oropharyngeal.  He was seen by Dr. Moser ([facility] radiation oncology) and started chemoradiation 6/20/22.  --  Since his last visit he is having expected symptoms of odynophagia, dysgeusia, some loss of taste, dysphagia, and associated weight loss.  He and his wife are concerned about the peg tube and potential complications so as long as he is able to eat they would prefer to hold off on getting the peg tube at this time.    Denies worsening hearing loss or worsening neuropathy.    He has baseline hearing deficit and uses  hearing aids.  He reports difficult peripheral IV access (veins roll). He has a port.  His wife and daughter accompany him at this visit.    Past Medical History:   Past Medical History:   Diagnosis Date    (HFpEF) heart failure with preserved ejection fraction 02/04/2022    NAT (acute kidney injury) 03/19/2017    ALLERGIC RHINITIS     Anemia     Anxiety     Basal cell carcinoma 10/19/2018    forehead and right medial shoulder    Basal cell carcinoma 01/09/2019    left nasal bridge and left posterior ear    Basal cell carcinoma 06/12/2020    left lower medial leg - Efudex    Basal cell carcinoma 11/30/2020    left medial upper eyelid canthus superficial BCC    Chronic rhinitis 05/03/2013    Chronic rhinitis 05/03/2013    Coronary artery disease involving native coronary artery of native heart without angina pectoris s/p RCA stent     Cortical cataract of both eyes 03/18/2016    Delayed sleep phase syndrome 03/13/2019    Depression     Erectile dysfunction 03/24/2014    Erectile dysfunction 03/24/2014    Essential hypertension     GERD (gastroesophageal reflux disease) 07/25/2012    Gout, arthritis     Grade III open fracture of left tibia and fibula s/p ex-fix on 7/1/16 and ORIF of left tibia on 7/15 07/06/2016    H/O: iritis     Helicobacter pylori (H. pylori) infection     Treated    Hepatitis     Herpes simplex keratoconjunctivitis 09/30/2015    - on acyclovir - followed by opthalmology, Dr. Uribe     Herpes simplex keratoconjunctivitis 09/30/2015    - on acyclovir - followed by opthalmology, Dr. Uribe     Hyperkalemia 02/28/2017    Hyperlipidemia     Hypogonadism male     Hypogonadism male     Immunodeficiency due to drug therapy 7/8/2022    Keloid cicatrix     Mixed anxiety and depressive disorder     Morbid obesity     Obstructive sleep apnea on CPAP     CPAP    Osteoarthritis of left knee 07/25/2012    Paroxysmal atrial fibrillation 07/06/2016    Primary osteoarthritis  "of left knee 07/25/2012    Prominent aorta 01/25/2016    "RESULTS: THE HEART IS MILDLY ENLARGED WITH A SLIGHTLY PROMINENT AORTA" - Xray Chest PA & Lateral 12-     Prostate cancer 02/15/2016    - followed by urology, Dr. Young     Prostate cancer 02/15/2016    - followed by urology, Dr. 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    chest and forehead    Squamous cell cancer of skin of nose     Traumatic type III open fracture of shaft of left tibia and fibula with nonunion 07/06/2016    Type III open fracture of left tibia and fibula with routine healing 07/06/2016    Vitamin D deficiency disease     Vitreous detachment 03/18/2016       Past Surgical HIstory:   Past Surgical History:   Procedure Laterality Date    ADJACENT TISSUE TRANSFER Left 11/30/2020    Procedure: ADJACENT TISSUE TRANSFER x2 ;  Surgeon: Teresa Cooper MD;  Location: [facility] 1ST FLR;  Service: Ophthalmology;  Laterality: Left;  Left glabellar 8.5x11x3 and Left upper eyelid advancement flap [id number]      Cardiac stenting x2      CATARACT EXTRACTION W/  INTRAOCULAR LENS IMPLANT Right 3/29/2016    Dr. Conteh    CATARACT EXTRACTION W/  INTRAOCULAR LENS IMPLANT Left 4/12/2016        COLONOSCOPY N/A 7/23/2020    Procedure: COLONOSCOPY;  Surgeon: Nico Lackey MD;  Location: [facility] (2ND FLR);  Service: Endoscopy;  Laterality: N/A;    DIRECT LARYNGOSCOPY N/A 5/4/2022    Procedure: LARYNGOSCOPY, DIRECT possible biopsy;  Surgeon: Etta Higgins MD;  Location: [facility];  Service: ENT;  Laterality: N/A;  RN PREOP ON 4/29/22. NEEDS T&S DAY OF SURGERY--NF  CONSENTS DAY OF SURGERY    DISSECTION OF NECK Right " "5/4/2022    Procedure: RADICAL NECK DISSECTION;  Surgeon: Etta Higgins MD;  Location: [facility] OR;  Service: ENT;  Laterality: Right;  TO FOLLOW DR ALMAZAN    ESOPHAGOGASTRODUODENOSCOPY N/A 7/23/2020    Procedure: EGD (ESOPHAGOGASTRODUODENOSCOPY);  Surgeon: Nico Lackey MD;  Location: [facility] (2ND FLR);  Service: Endoscopy;  Laterality: N/A;  per Dr Lackey-Will proceed with EGD and colonoscopy on the 2nd floor due to his comorbidities including obesity, sleep apnea, restrictive lung disease, coronary artery disease.  has loop recorder      ok to hold Eliquis 2 days per Dr Sandhu-must remain    EXTERNAL FIXATION TIBIAL FRACTURE Left 07/01/2016    INSERTION OF IMPLANTABLE LOOP RECORDER  04/07/2017    LEFT HEART CATHETERIZATION Left 1/30/2020    Procedure: Left heart cath;  Surgeon: Benjamin Sandhu MD;  Location: [facility] CATH LAB;  Service: Cardiology;  Laterality: Left;  RN PREOP 1/28/20--Pt starting Plavix loading dose today (8pills)- Dr. Sandhu aware.  Pt has a bandaged "non healing area to LLE"--Dr. Sandhu aware.    LEFT HEART CATHETERIZATION Left 2/14/2020    Procedure: Left heart cath, IVUS guided left main / LAD PCI. Noon start, radial approach;  Surgeon: Miguel Angel Brady MD;  Location: [facility] CATH LAB;  Service: Cardiology;  Laterality: Left;  RN PRE OP 2-7-2020  BMI--45.61    ORIF TIBIA FRACTURE Left 07/15/2016    PROBING OF NASOLACRIMAL DUCT WITH INSERTION OF TUBE Left 11/30/2020    Procedure: PROBING, NASOLACRIMAL DUCT,;  Surgeon: Teresa Cooper MD;  [address];  Service: Ophthalmology;  Laterality: Left;    RADIOACTIVE SEED IMPLANTATION OF PROSTATE N/A 8/8/2018    Procedure: INSERTION, RADIOACTIVE SEED, PROSTATE;  Surgeon: Bipin Thompson MD;  Location: [facility] OR 1ST FLR;  Service: Urology;  Laterality: N/A;  1 hour    SKIN BIOPSY      Squamous cell cancer removal x3 with Mohs surgery      TONSILLECTOMY      TOTAL KNEE ARTHROPLASTY  10/2012    TRIGGER FINGER RELEASE Right " 10/14/2020    Procedure: RELEASE, TRIGGER FINGER - right;  Surgeon: Rad Swift MD;  Location: [facility] OR;  Service: Orthopedics;  Laterality: Right;    trus/bx      ULTRASOUND GUIDANCE  2/14/2020    Procedure: Ultrasound Guidance;  Surgeon: Miguel Angel Brady MD;  Location: [facility] CATH LAB;  Service: Cardiology;;       Family History:   Family History   Problem Relation Age of Onset    Skin cancer Father     Lung cancer Father     Cancer Father         smoker,     Alzheimer's disease Mother     Hypertension Mother     Cancer Sister         colon, lung cancer     No Known Problems Sister     Cancer Brother         skin cancer, polypectomy     Peripheral vascular disease Unknown     No Known Problems Maternal Aunt     No Known Problems Maternal Uncle     No Known Problems Paternal Aunt     No Known Problems Paternal Uncle     No Known Problems Maternal Grandmother     No Known Problems Maternal Grandfather     No Known Problems Paternal Grandmother     No Known Problems Paternal Grandfather     Melanoma Neg Hx     Psoriasis Neg Hx     Lupus Neg Hx     Eczema Neg Hx     Amblyopia Neg Hx     Blindness Neg Hx     Cataracts Neg Hx     Diabetes Neg Hx     Glaucoma Neg Hx     Macular degeneration Neg Hx     Retinal detachment Neg Hx     Strabismus Neg Hx     Stroke Neg Hx     Thyroid disease Neg Hx     Acne Neg Hx        Social History:  reports that he has never smoked. He has never used smokeless tobacco. He reports current alcohol use of about 2.0 standard drinks of alcohol per week. He reports that he does not use drugs.    Allergies:  Review of patient's allergies indicates:   Allergen Reactions    Ciprofloxacin Rash     Diffuse pruritic morbilliform rash developed 3/15/2017 after dose of cipro; previously in 2/2017 he had rash/fevers after initiation of cipro    Zosyn [piperacillin-tazobactam] Rash     Diffuse pruritic morbilliform rash developed 3/15/2017.  Then, 430am dose on 3/16  and rash worsened with SOB/tachypnea but no hypoxemia.     Bacitracin Itching and Rash     Violaceous rash in area of topical Tx.        Medications:  Current Outpatient Medications   Medication Sig Dispense Refill    acyclovir (ZOVIRAX) 800 MG Tab Take 200 mg by mouth 2 (two) times daily.      amiodarone (PACERONE) 200 MG Tab Take 1 tablet (200 mg total) by mouth once daily. 90 tablet 3    artificial tears (ISOPTO TEARS) 0.5 % ophthalmic solution Place 1 drop into both eyes as needed.      atorvastatin (LIPITOR) 40 MG tablet Take 1 tablet (40 mg total) by mouth once daily. (Patient taking differently: Take 40 mg by mouth every evening.) 90 tablet 1    clopidogreL (PLAVIX) 75 mg tablet Take 1 tablet by mouth once daily (Patient taking differently: Take by mouth once daily.) 90 tablet 3    dexAMETHasone (DECADRON) 4 MG Tab 4mg (1 tablet) by mouth twice a day for two days starting the day after chemo.  Repeat after each dose of chemo. 24 tablet 1    ELIQUIS 5 mg Tab Take 1 tablet by mouth twice daily 180 tablet 3    furosemide (LASIX) 20 MG tablet Take 1 tablet (20 mg total) by mouth once daily. (Patient taking differently: Take 20 mg by mouth every morning.) 90 tablet 3    gabapentin (NEURONTIN) 100 MG capsule 2 capsules once daily 30 capsule 0    gabapentin (NEURONTIN) 300 MG capsule Take 1 capsule (300 mg total) by mouth 3 (three) times daily. (Patient taking differently: Take 300 mg by mouth 3 (three) times daily as needed.) 270 capsule 1    LIDOcaine-prilocaine (EMLA) cream Apply generously to port site 30-60 min prior to chemo and then cover with saran wrap. 30 g 2    melatonin 5 mg Tab Take 10 mg by mouth nightly.      metoprolol succinate (TOPROL-XL) 25 MG 24 hr tablet Take 1 tablet by mouth once daily (Patient taking differently: Take by mouth every morning.) 90 tablet 3    ondansetron (ZOFRAN) 8 MG tablet Take 1 tablet (8 mg total) by mouth every 8 (eight) hours as needed for Nausea. 30 tablet  3    oxybutynin (DITROPAN-XL) 5 MG TR24 Take 1 tablet (5 mg total) by mouth once daily. (Patient taking differently: Take 5 mg by mouth every evening.) 30 tablet 11    oxyCODONE-acetaminophen (PERCOCET) 5-325 mg per tablet Take 2 tablets by mouth every 6 (six) hours as needed for Pain. 25 tablet 0    pantoprazole (PROTONIX) 40 MG tablet Take 1 tablet (40 mg total) by mouth once daily. 90 tablet 3    sildenafiL (VIAGRA) 100 MG tablet Take 1 tablet (100 mg total) by mouth daily as needed. 10 tablet 11    tamsulosin (FLOMAX) 0.4 mg Cap Take 1 capsule (0.4 mg total) by mouth once daily. (Patient taking differently: Take 1 capsule by mouth every evening.) 90 capsule 3    traZODone (DESYREL) 50 MG tablet Take 1 tablet (50 mg total) by mouth nightly as needed for Insomnia. 30 tablet 2    venlafaxine (EFFEXOR) 75 MG tablet Take 2 tablets (150 mg total) by mouth 2 (two) times daily. (Patient taking differently: Take 75 mg by mouth every morning.) 360 tablet 0    vitamin D 1000 units Tab Take 1 tablet (1,000 Units total) by mouth once daily. (Patient taking differently: Take 2,000 Units by mouth once daily.)      nitroGLYCERIN (NITROSTAT) 0.4 MG SL tablet Place 1 tablet (0.4 mg total) under the tongue every 5 (five) minutes as needed for Chest pain. 25 tablet 11    sucralfate (CARAFATE) 100 mg/mL suspension        No current facility-administered medications for this visit.       Review of Systems   Constitutional: Positive for activity change and unexpected weight change. Negative for appetite change, chills, diaphoresis, fatigue and fever.   HENT: Positive for hearing loss, sore throat and trouble swallowing. Negative for nosebleeds.    Eyes: Negative for visual disturbance.   Respiratory: Positive for shortness of breath. Negative for cough, chest tightness and wheezing.    Cardiovascular: Negative for chest pain and leg swelling.   Gastrointestinal: Positive for constipation. Negative for abdominal distention,  "abdominal pain, blood in stool, diarrhea, nausea and vomiting.   Endocrine: Negative for cold intolerance and heat intolerance.   Genitourinary: Negative for difficulty urinating and dysuria.   Musculoskeletal: Negative for arthralgias and back pain.   Skin: Negative for color change.   Neurological: Positive for weakness and numbness. Negative for dizziness, light-headedness and headaches.   Hematological: Negative for adenopathy. Does not bruise/bleed easily.   Psychiatric/Behavioral: Negative for confusion.       ECOG Performance Status:     ECOG SCORE    1 - Restricted in strenuous activity-ambulatory and able to carry out work of a light nature         Objective:      Vitals:   Vitals:    07/08/22 1304   BP: (!) 151/65   BP Location: Left arm   Patient Position: Sitting   Pulse: 60   Temp: 98.1 °F (36.7 °C)   TempSrc: Oral   SpO2: 99%   Weight: (!) 146.5 kg (322 lb 15.6 oz)   Height: 6' 1" (1.854 m)     BMI: Body mass index is 42.61 kg/m².    Physical Exam  Constitutional:       General: He is not in acute distress.     Appearance: He is obese. He is not ill-appearing.   HENT:      Head: Normocephalic and atraumatic.      Nose: Nose normal.      Mouth/Throat:      Mouth: Mucous membranes are moist.      Pharynx: Oropharyngeal exudate present. No posterior oropharyngeal erythema.   Eyes:      General: No scleral icterus.     Extraocular Movements: Extraocular movements intact.      Conjunctiva/sclera: Conjunctivae normal.      Pupils: Pupils are equal, round, and reactive to light.   Cardiovascular:      Rate and Rhythm: Normal rate.      Heart sounds: No murmur heard.  Pulmonary:      Effort: Pulmonary effort is normal. No respiratory distress.   Abdominal:      General: There is distension.      Palpations: Abdomen is soft.      Tenderness: There is no abdominal tenderness.   Musculoskeletal:         General: Normal range of motion.      Cervical back: Normal range of motion.   Skin:     General: Skin is warm " and dry.      Coloration: Skin is not jaundiced.   Neurological:      General: No focal deficit present.      Mental Status: He is alert.   Psychiatric:         Mood and Affect: Mood normal.         Laboratory Data:   Recent Results (from the past 168 hour(s))   CBC Auto Differential    Collection Time: 07/08/22  2:00 PM   Result Value Ref Range    WBC 11.62 3.90 - 12.70 K/uL    RBC 3.06 (L) 4.60 - 6.20 M/uL    Hemoglobin 10.7 (L) 14.0 - 18.0 g/dL    Hematocrit 30.8 (L) 40.0 - 54.0 %     (H) 82 - 98 fL    MCH 35.0 (H) 27.0 - 31.0 pg    MCHC 34.7 32.0 - 36.0 g/dL    RDW 13.9 11.5 - 14.5 %    Platelets 189 150 - 450 K/uL    MPV 10.2 9.2 - 12.9 fL    Immature Granulocytes 0.5 0.0 - 0.5 %    Gran # (ANC) 9.9 (H) 1.8 - 7.7 K/uL    Immature Grans (Abs) 0.06 (H) 0.00 - 0.04 K/uL    Lymph # 0.7 (L) 1.0 - 4.8 K/uL    Mono # 1.0 0.3 - 1.0 K/uL    Eos # 0.0 0.0 - 0.5 K/uL    Baso # 0.01 0.00 - 0.20 K/uL    nRBC 0 0 /100 WBC    Gran % 84.9 (H) 38.0 - 73.0 %    Lymph % 5.7 (L) 18.0 - 48.0 %    Mono % 8.8 4.0 - 15.0 %    Eosinophil % 0.0 0.0 - 8.0 %    Basophil % 0.1 0.0 - 1.9 %    Differential Method Automated    Comprehensive Metabolic Panel    Collection Time: 07/08/22  2:00 PM   Result Value Ref Range    Sodium 139 136 - 145 mmol/L    Potassium 4.4 3.5 - 5.1 mmol/L    Chloride 103 95 - 110 mmol/L    CO2 29 23 - 29 mmol/L    Glucose 81 70 - 110 mg/dL    BUN 31 (H) 8 - 23 mg/dL    Creatinine 1.5 (H) 0.5 - 1.4 mg/dL    Calcium 9.0 8.7 - 10.5 mg/dL    Total Protein 6.1 6.0 - 8.4 g/dL    Albumin 3.3 (L) 3.5 - 5.2 g/dL    Total Bilirubin 0.9 0.1 - 1.0 mg/dL    Alkaline Phosphatase 83 55 - 135 U/L    AST 11 10 - 40 U/L    ALT 18 10 - 44 U/L    Anion Gap 7 (L) 8 - 16 mmol/L    eGFR if African American 52 (A) >60 mL/min/1.73 m^2    eGFR if non African American 45 (A) >60 mL/min/1.73 m^2   Magnesium    Collection Time: 07/08/22  2:00 PM   Result Value Ref Range    Magnesium 2.0 1.6 - 2.6 mg/dL           Imaging:          Assessment:       1. Secondary malignant neoplasm of cervical lymph node    2. Head and neck cancer    3. Sensorineural hearing loss (SNHL) of both ears    4. Stage 3a chronic kidney disease    5. Immunodeficiency due to drug therapy    6. Macrocytic anemia           Plan:       Problem List Items Addressed This Visit        ENT    Sensorineural hearing loss (SNHL) of both ears    Overview     Hearing aids at baseline           Current Assessment & Plan     Denies worsening hearing loss  -continue monitoring              Renal/    Stage 3a chronic kidney disease    Overview     -followed by nephrology.               Current Assessment & Plan     Creatinine stable, monitor cmp              Immunology/Multi System    Immunodeficiency due to drug therapy    Current Assessment & Plan     Afebrile, ANC sufficient for treatment  -monitor cbc              Oncology    Macrocytic anemia    Current Assessment & Plan     Gradually worsening anemia as expected with carboplatin  -continue monitoring cbc  -transfuse for hg <7           Head and neck cancer    Overview     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  5/5/22 radical neck dissection level 2-5, sacrifice portion of SCM and accessory nerve due to tumor invasion.  DL was performed; no obvious lesion to biopsy.  Node at least 7 cm with MOHAN. p16 weak-moderate positive.  6/3/22 PET CT with hypermetabolic uptake at right neck (site of surgery). Nonspecific uptake in small bowel (without CT mass correlate), axial skeleton (likely hyperplastic marrow), and prostate (brachytherapy beads present).  6/20/22 started chemoradiation with carboplatin (cisplatin ineligible)           Current Assessment & Plan     Squamous cell carcinoma, oropharyngeal vs cutaneous origin. Currently receiving chemoradiation for MOHAN+.  Currently experiencing odynophagia, dysgeusia, and some dysphagia.  He is hesitant to get a peg tube.   -labs reviewed; ok to proceed with week  4  --renally adjust carboplatin  -sucralfate and magic mouthwash for sore throat  -hold on peg tube at this time  -not a candidate for cisplatin with his renal dysfunction  -s/p port placement  -labs on Fridays weekly at the [facility] [place]  -Follow up with me in 3 weeks           Secondary malignant neoplasm of cervical lymph node - Primary    Current Assessment & Plan                        No orders of the defined types were placed in this encounter.        Advance Care Planning I initiated the process of advance care planning at his initial visit and explained the importance of this process to the patient.  Then the patient received detailed information about the importance of designating a Health Care Power of  (HCPOA). he was instructed to communicate with this person about their wishes for future healthcare, should he become sick and lose decision-making capacity. The patient has not previously appointed a HCPOA. After our discussion, the patient has decided to complete a HCPOA and has appointed his daughter        Robert Hargrove MD  Hematology Oncology

## 2022-07-11 NOTE — TELEPHONE ENCOUNTER
Called wife, GAVIN for keyshawn and informed her that pt's appt was changed to virtual at 4:20 pm.

## 2022-07-11 NOTE — TELEPHONE ENCOUNTER
----- Message from Norma Riley sent at 7/11/2022 12:34 PM CDT -----  Type: Patient Call Back    Who called: wife     What is the request in detail: pt would like the virtual appt for today instead of coming in     Can the clinic reply by [facility]?    Would the patient rather a call back or a response via [facility]? Call     Best call back number: [phone] (home)

## 2022-07-11 NOTE — PROGRESS NOTES
"  {CHANGE CHIEF COMPLAINT   :17807::"   "}274}  The patient location is:  Patient Home.  The chief complaint leading to consultation is: pain with swallowing   .  Visit type: Virtual visit with synchronous audio and video.  Total time spent with patient: 20 minutes.  Each patient to whom he or she provides medical services by telemedicine is:  (1) informed of the relationship between the physician and patient and the respective role of any other health care provider with respect to management of the patient; and (2) notified that he or she may decline to receive medical services by telemedicine and may withdraw from such care at any time.      HISTORY OF PRESENT ILLNESS:  Janusz Montgomery Jr. is a 77 y.o. male who was seen virtually today for pain with swallowing  .  The patient was seen today for a virtual visit.  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  The radiation therapy is causing him some tongue pain and pain with swallowing.  He has some medicine that he has been given to help with the pain, but he has been using it sparingly.  He also reports some congestion and has difficulty clearing his nose and throat.  He was seen today by the radiation therapist and was told there is no infection at this time.  He was advised to use some Mucinex to help thin secretions.  He is overall feeling okay.  He reports about a 10 lb weight loss.  He is trying to consume soft foods.  There was some discussion about the possibility of a feeding to, but for now they have put that on hold.        PAST MEDICAL HISTORY:  Past Medical History:   Diagnosis Date    (HFpEF) heart failure with preserved ejection fraction 02/04/2022    NAT (acute kidney injury) 03/19/2017    ALLERGIC RHINITIS     Anemia     Anxiety     Basal cell carcinoma 10/19/2018    forehead and right medial " "shoulder    Basal cell carcinoma 01/09/2019    left nasal bridge and left posterior ear    Basal cell carcinoma 06/12/2020    left lower medial leg - Efudex    Basal cell carcinoma 11/30/2020    left medial upper eyelid canthus superficial BCC    Chronic rhinitis 05/03/2013    Chronic rhinitis 05/03/2013    Coronary artery disease involving native coronary artery of native heart without angina pectoris s/p RCA stent     Cortical cataract of both eyes 03/18/2016    Delayed sleep phase syndrome 03/13/2019    Depression     Erectile dysfunction 03/24/2014    Erectile dysfunction 03/24/2014    Essential hypertension     GERD (gastroesophageal reflux disease) 07/25/2012    Gout, arthritis     Grade III open fracture of left tibia and fibula s/p ex-fix on 7/1/16 and ORIF of left tibia on 7/15 07/06/2016    H/O: iritis     Helicobacter pylori (H. pylori) infection     Treated    Hepatitis     Herpes simplex keratoconjunctivitis 09/30/2015    - on acyclovir - followed by opthalmology, Dr. Uribe     Herpes simplex keratoconjunctivitis 09/30/2015    - on acyclovir - followed by opthalmology, Dr. Uribe     Hyperkalemia 02/28/2017    Hyperlipidemia     Hypogonadism male     Hypogonadism male     Immunodeficiency due to drug therapy 7/8/2022    Keloid cicatrix     Mixed anxiety and depressive disorder     Morbid obesity     Obstructive sleep apnea on CPAP     CPAP    Osteoarthritis of left knee 07/25/2012    Paroxysmal atrial fibrillation 07/06/2016    Primary osteoarthritis of left knee 07/25/2012    Prominent aorta 01/25/2016    "RESULTS: THE HEART IS MILDLY ENLARGED WITH A SLIGHTLY PROMINENT AORTA" - Xray Chest PA & Lateral 12-     Prostate cancer 02/15/2016    - followed by urology, Dr. Young     Prostate cancer 02/15/2016    - followed by urology, Dr. Young     PVD (peripheral vascular disease)     Refractive error 03/18/2016    Respiratory failure, unspecified with " hypoxia 02/04/2022    SCC (squamous cell carcinoma) 06/29/2021    R trap    SCC (squamous cell carcinoma) 01/10/2022    left lateral ankle    Skin disease     Skin ulcer     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    chest and forehead    Squamous cell cancer of skin of nose     Traumatic type III open fracture of shaft of left tibia and fibula with nonunion 07/06/2016    Type III open fracture of left tibia and fibula with routine healing 07/06/2016    Vitamin D deficiency disease     Vitreous detachment 03/18/2016       PAST SURGICAL HISTORY:  Past Surgical History:   Procedure Laterality Date    ADJACENT TISSUE TRANSFER Left 11/30/2020    Procedure: ADJACENT TISSUE TRANSFER x2 ;  Surgeon: Teresa Cooper MD;  Location: [facility] OR 1ST FLR;  Service: Ophthalmology;  Laterality: Left;  Left glabellar 8.5x11x3 and Left upper eyelid advancement flap [id number]      Cardiac stenting x2      CATARACT EXTRACTION W/  INTRAOCULAR LENS IMPLANT Right 3/29/2016    Dr. Conteh    CATARACT EXTRACTION W/  INTRAOCULAR LENS IMPLANT Left 4/12/2016        COLONOSCOPY N/A 7/23/2020    Procedure: COLONOSCOPY;  Surgeon: Nico Lackey MD;  Location: [facility] (2ND FLR);  Service: Endoscopy;  Laterality: N/A;    DIRECT LARYNGOSCOPY N/A 5/4/2022    Procedure: LARYNGOSCOPY, DIRECT possible biopsy;  Surgeon: Etta Higgins MD;  Location: [facility] OR;  Service: ENT;  Laterality: N/A;  RN PREOP ON 4/29/22. NEEDS T&S DAY OF SURGERY--NF  CONSENTS DAY OF SURGERY    DISSECTION OF NECK Right 5/4/2022    Procedure: RADICAL NECK DISSECTION;  Surgeon: Etta Higgins MD;  Location: [facility] OR;  Service: ENT;  Laterality: Right;  TO FOLLOW DR ALMAZAN    ESOPHAGOGASTRODUODENOSCOPY N/A 7/23/2020    Procedure: EGD (ESOPHAGOGASTRODUODENOSCOPY);  Surgeon: Nico Lackey MD;  Location: [facility] ENDO (2ND FLR);  Service: Endoscopy;  Laterality: N/A;  per Dr Lackey-Will proceed with EGD and colonoscopy on the 2nd floor due to  "his comorbidities including obesity, sleep apnea, restrictive lung disease, coronary artery disease.  has loop recorder      ok to hold Eliquis 2 days per Dr Sandhu-must remain    EXTERNAL FIXATION TIBIAL FRACTURE Left 07/01/2016    INSERTION OF IMPLANTABLE LOOP RECORDER  04/07/2017    LEFT HEART CATHETERIZATION Left 1/30/2020    Procedure: Left heart cath;  Surgeon: Benjamin Sandhu MD;  Location: [facility] CATH LAB;  Service: Cardiology;  Laterality: Left;  RN PREOP 1/28/20--Pt starting Plavix loading dose today (8pills)- Dr. Sandhu aware.  Pt has a bandaged "non healing area to LLE"--Dr. Sandhu aware.    LEFT HEART CATHETERIZATION Left 2/14/2020    Procedure: Left heart cath, IVUS guided left main / LAD PCI. Noon start, radial approach;  Surgeon: Miguel Angel Brady MD;  Location: [facility] CATH LAB;  Service: Cardiology;  Laterality: Left;  RN PRE OP 2-7-2020  BMI--45.61    ORIF TIBIA FRACTURE Left 07/15/2016    PROBING OF NASOLACRIMAL DUCT WITH INSERTION OF TUBE Left 11/30/2020    Procedure: PROBING, NASOLACRIMAL DUCT,;  Surgeon: Teresa Cooper MD;  Location: [facility] OR [address] [place] [address];  Service: Ophthalmology;  Laterality: Left;    RADIOACTIVE SEED IMPLANTATION OF PROSTATE N/A 8/8/2018    Procedure: INSERTION, RADIOACTIVE SEED, PROSTATE;  Surgeon: Bipin Thompson MD;  Location: [address];  Service: Urology;  Laterality: N/A;  1 hour    SKIN BIOPSY      Squamous cell cancer removal x3 with Mohs surgery      TONSILLECTOMY      TOTAL KNEE ARTHROPLASTY  10/2012    TRIGGER FINGER RELEASE Right 10/14/2020    Procedure: RELEASE, TRIGGER FINGER - right;  Surgeon: Rad Swift MD;  Location: [facility] OR;  Service: Orthopedics;  Laterality: Right;    trus/bx      ULTRASOUND GUIDANCE  2/14/2020    Procedure: Ultrasound Guidance;  Surgeon: Miguel Angel Brady MD;  Location: [facility] CATH LAB;  Service: Cardiology;;       SOCIAL HISTORY:  Social History     Socioeconomic History    Marital status:    Occupational History    " Occupation: Retired    Tobacco Use    Smoking status: Never Smoker    Smokeless tobacco: Never Used   Substance and Sexual Activity    Alcohol use: Yes     Alcohol/week: 2.0 standard drinks     Types: 2 Cans of beer per week     Comment: occasionally, beer    Drug use: Never    Sexual activity: Yes     Partners: Female     Birth control/protection: None     Social Determinants of Health     Financial Resource Strain: Low Risk     Difficulty of Paying Living Expenses: Not hard at all   Food Insecurity: No Food Insecurity    Worried About Running Out of Food in the Last Year: Never true    Ran Out of Food in the Last Year: Never true   Transportation Needs: No Transportation Needs    Lack of Transportation (Medical): No    Lack of Transportation (Non-Medical): No   Physical Activity: Inactive    Days of Exercise per Week: 0 days    Minutes of Exercise per Session: 0 min   Stress: No Stress Concern Present    Feeling of Stress : Not at all   Social Connections: Unknown    Frequency of Communication with Friends and Family: More than three times a week    Frequency of Social Gatherings with Friends and Family: Twice a week    Active Member of Clubs or Organizations: Yes    Attends Club or Organization Meetings: More than 4 times per year    Marital Status:    Housing Stability: Low Risk     Unable to Pay for Housing in the Last Year: No    Number of Places Lived in the Last Year: 1    Unstable Housing in the Last Year: No       FAMILY HISTORY:  Family History   Problem Relation Age of Onset    Skin cancer Father     Lung cancer Father     Cancer Father         smoker,     Alzheimer's disease Mother     Hypertension Mother     Cancer Sister         colon, lung cancer     No Known Problems Sister     Cancer Brother         skin cancer, polypectomy     Peripheral vascular disease Unknown     No Known Problems Maternal Aunt     No Known Problems Maternal Uncle     No Known Problems  Paternal Aunt     No Known Problems Paternal Uncle     No Known Problems Maternal Grandmother     No Known Problems Maternal Grandfather     No Known Problems Paternal Grandmother     No Known Problems Paternal Grandfather     Melanoma Neg Hx     Psoriasis Neg Hx     Lupus Neg Hx     Eczema Neg Hx     Amblyopia Neg Hx     Blindness Neg Hx     Cataracts Neg Hx     Diabetes Neg Hx     Glaucoma Neg Hx     Macular degeneration Neg Hx     Retinal detachment Neg Hx     Strabismus Neg Hx     Stroke Neg Hx     Thyroid disease Neg Hx     Acne Neg Hx        ALLERGIES AND MEDICATIONS: updated and reviewed.   Review of patient's allergies indicates:   Allergen Reactions    Ciprofloxacin Rash     Diffuse pruritic morbilliform rash developed 3/15/2017 after dose of cipro; previously in 2/2017 he had rash/fevers after initiation of cipro    Zosyn [piperacillin-tazobactam] Rash     Diffuse pruritic morbilliform rash developed 3/15/2017.  Then, 430am dose on 3/16 and rash worsened with SOB/tachypnea but no hypoxemia.     Bacitracin Itching and Rash     Violaceous rash in area of topical Tx.      Medication List with Changes/Refills   Current Medications    ACYCLOVIR (ZOVIRAX) 800 MG TAB    Take 200 mg by mouth 2 (two) times daily.    AMIODARONE (PACERONE) 200 MG TAB    Take 1 tablet (200 mg total) by mouth once daily.    ARTIFICIAL TEARS (ISOPTO TEARS) 0.5 % OPHTHALMIC SOLUTION    Place 1 drop into both eyes as needed.    ATORVASTATIN (LIPITOR) 40 MG TABLET    Take 1 tablet (40 mg total) by mouth once daily.    CLOPIDOGREL (PLAVIX) 75 MG TABLET    Take 1 tablet by mouth once daily    DEXAMETHASONE (DECADRON) 4 MG TAB    4mg (1 tablet) by mouth twice a day for two days starting the day after chemo.  Repeat after each dose of chemo.    ELIQUIS 5 MG TAB    Take 1 tablet by mouth twice daily    FUROSEMIDE (LASIX) 20 MG TABLET    Take 1 tablet (20 mg total) by mouth once daily.    GABAPENTIN (NEURONTIN) 100 MG  CAPSULE    2 capsules once daily    GABAPENTIN (NEURONTIN) 300 MG CAPSULE    Take 1 capsule (300 mg total) by mouth 3 (three) times daily.    LIDOCAINE-PRILOCAINE (EMLA) CREAM    Apply generously to port site 30-60 min prior to chemo and then cover with saran wrap.    MELATONIN 5 MG TAB    Take 10 mg by mouth nightly.    METOPROLOL SUCCINATE (TOPROL-XL) 25 MG 24 HR TABLET    Take 1 tablet by mouth once daily    NITROGLYCERIN (NITROSTAT) 0.4 MG SL TABLET    Place 1 tablet (0.4 mg total) under the tongue every 5 (five) minutes as needed for Chest pain.    ONDANSETRON (ZOFRAN) 8 MG TABLET    Take 1 tablet (8 mg total) by mouth every 8 (eight) hours as needed for Nausea.    OXYBUTYNIN (DITROPAN-XL) 5 MG TR24    Take 1 tablet (5 mg total) by mouth once daily.    OXYCODONE-ACETAMINOPHEN (PERCOCET) 5-325 MG PER TABLET    Take 2 tablets by mouth every 6 (six) hours as needed for Pain.    PANTOPRAZOLE (PROTONIX) 40 MG TABLET    Take 1 tablet (40 mg total) by mouth once daily.    SILDENAFIL (VIAGRA) 100 MG TABLET    Take 1 tablet (100 mg total) by mouth daily as needed.    SUCRALFATE (CARAFATE) 100 MG/ML SUSPENSION        TAMSULOSIN (FLOMAX) 0.4 MG CAP    Take 1 capsule (0.4 mg total) by mouth once daily.    TRAZODONE (DESYREL) 50 MG TABLET    Take 1 tablet (50 mg total) by mouth nightly as needed for Insomnia.    VENLAFAXINE (EFFEXOR) 75 MG TABLET    Take 2 tablets (150 mg total) by mouth 2 (two) times daily.    VITAMIN D 1000 UNITS TAB    Take 1 tablet (1,000 Units total) by mouth once daily.          CARE TEAM:  Patient Care Team:  Miguelina Weathers MD as PCP - General (Internal Medicine)  Navdeep Guardado MD (Cardiology)  Luis Palacio MD as Consulting Physician (Cardiology)  Jessica Herndon MD (Inactive) (Nephrology)  Colt Sosa MD as Consulting Physician (Plastic Surgery)  AURELIANO Minor MD as Consulting Physician (Urology)  Walter Cortés MD as Consulting Physician (Orthopedic Surgery)  Miguelina LAND  MD Jasiel as Hypertension Digital Medicine Responsible Provider (Internal Medicine)  Suzie CrossD as Hypertension Digital Medicine Clinician (Pharmacist)  Miguel Smith MD (Inactive) as Consulting Physician (Interventional Cardiology)  Naomy Anaya LPN as Licensed Practical Nurse  Jose Francisco Sawyer MD as Consulting Physician (Rheumatology)  Sandra Gary DPM as Consulting Physician (Podiatry)  MOE Smalls (Inactive) as Audiologist (Audiology)  Fredo Manrique MD (Inactive) as Resident (Pulmonary Disease)  Fabio Knutson III, MD as Consulting Physician (Otolaryngology)  Hermelinda Mcclure MD as Consulting Physician (Dermatology)  Dayanara Reed as Digital Medicine Health   Radha Leroy RD as Dietitian (Nutrition)  Astrid Sapp Managed Medicare as Hypertension Digital Medicine Contract  Jocelyne Monge PharmD as Hyperlipidemia Digital Medicine Clinician (Pharmacist)  Miguelina Weathers MD as Hyperlipidemia Digital Medicine Responsible Provider (Internal Medicine)         REVIEW OF SYSTEMS:  Review of Systems   Constitutional: Negative for activity change and unexpected weight change.   HENT: Positive for congestion and trouble swallowing. Negative for hearing loss and rhinorrhea.    Eyes: Negative for discharge and visual disturbance.   Respiratory: Positive for wheezing. Negative for chest tightness.    Cardiovascular: Negative for chest pain and palpitations.   Gastrointestinal: Negative for blood in stool, constipation, diarrhea and vomiting.   Endocrine: Negative for polydipsia and polyuria.   Genitourinary: Negative for difficulty urinating, hematuria and urgency.   Musculoskeletal: Negative for arthralgias, joint swelling and neck pain.   Neurological: Negative for weakness and headaches.   Psychiatric/Behavioral: Negative for confusion and dysphoric mood.         PHYSICAL EXAM:   General appearance - alert and in no distress  Psychiatric - alert, oriented to  person, place and time, normal speech  Unable to perform remainder of PE as this was a video visit     LABS:  No labs needed at this time    ASSESSMENT AND PLAN:  1. Squamous cell carcinoma of overlapping sites of skin/2. Secondary malignant neoplasm of cervical lymph node  The current medical regimen is effective;  continue present plan and medications.   Followed by: Hematology/Oncology.   His wife reports that he will be referred to speech therapy in the future to help with tongue mobility.    3. Tongue pain/4. Pain with swallowing  Expected complication from his treatment. We discussed to focus on soft foods high in protein (yoghurt, cheese, humus, guacamole, protein shakes). He will start using his pain medication. Weight loss is not significant and agree to hold off on PEG tube for now. Overall kidney function remaining stable in the last month - will continue to focus on fluid intake.    5. Nasal congestion  Ok to use mucinex/mucinex DM to help thin secretions.     6. Benign hypertension with chronic kidney disease, stage III  Discussed sodium restriction, maintaining ideal body weight and regular exercise program as physiologic means to achieve blood pressure control. The patient will strive towards this.   The current medical regimen is effective;  continue present plan and medications. Recommended patient to check home readings to monitor and see me for followup as scheduled or sooner as needed.   Patient was educated that both decongestant and anti-inflammatory medication may raise blood pressure.  Stable decreased kidney function. Observe. Patient counseled to avoid/minimize the use of anti-inflammatory  Medication. Discussed to stay well hydrated. Also discussed with patient that good control of blood pressure and/or diabetes, if present, will help to prevent progression.  The patient is active on the digital hypertension program.           Follow up in about 3 months (around 10/11/2022), or if symptoms  worsen or fail to improve, for follow up chronic medical conditions.. or sooner as needed.

## 2022-07-11 NOTE — PLAN OF CARE
Pt arrived to unit for C1D22 of Carboplatin. Labs reviewed from this past Friday.  Pt endorsing generalized fatigue along with weakness. Endorses pain to his mouth due to mouth sores from radiation. Has magic mouthwash at home to use. VSS. Received pre-med of Aloxi/Dex. Carboplatin then given over 1 hour with additional 500ml NS bolus. Tolerated well. Understands to return Friday for labs from port. Discharged from unit in NAD.

## 2022-07-11 NOTE — TELEPHONE ENCOUNTER
Spoke to pt's wife Bri to confirm they want to switch to a virtual appt. Wife stated he won't be able to do the 2:20 appt as he's at chemo till 3 pm. He is willing to log on later today or at a different day.
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
## 2022-07-19 NOTE — PLAN OF CARE
Pt arrived to unit. VSS. Pt has lost 15lbs recently. Refusing feeding tube at this time. Rash to right neck from radiation. Decreased appetite. Tolerated chemo and IVF. Pt has next appts. Pt discharged from unit in wheelchair, accompanied by family. No distress noted.

## 2022-07-21 NOTE — TELEPHONE ENCOUNTER
Care Companion Intervention    Reason for intervention: Questionnaire response  Comment: Nausea and dizziness    Intervention: Other intervention (comment)  Comment: Portal message sent to evlaute for dehydration and current use of anti-emetics.

## 2022-07-21 NOTE — HPI
Mr. Montgomery is a 77-year-old gentleman with morbid obesity (BMI 45), CAD status post 3 stents, hypertension, PAD, paroxysmal atrial fibrillation on Eliquis, CHARISMA on CPAP q.h.s. (14 cm H2O), asbestosis, CKD 3, history of prostate cancer status post resection, depression, head and neck tumor status post radical neck dissection 1.5 weeks ago.  He presented to our emergency department for elevated heart rate and progressive dyspnea with exertion.  Patient was discharged after neck dissection 1.5 weeks ago and since then has experienced increased dyspnea.  Patient took his SpO2 via pulse oximeter today and found that it was normal but found that his heart rate was elevated to the 170s.  He was directed by an on-call nurse to present to our emergency department.  Reports associated fatigue, orthopnea, cough.  Patient used albuterol multiple times this morning.  Denies PND, lower extremity edema, fever, chills.  Patient has been on a beta-blocker and Eliquis for many years related to the paroxysmal atrial fibrillation.  Though has not been in documented atrial fibrillation for years.  He also notes some redness at his incision site on his right upper chest adjacent to neck.    In the emergency department he was tachycardic to the 230s and hypotensive as low as 70/35.  Labs revealed stable macrocytic anemia.  Leukocyte count below recent admission.  Creatinine elevated to 1.8, slightly above baseline.  Troponin elevated to 0.046.  BNP within normal limits.  Lactate not elevated.  Chest x-ray without acute changes.  Patient started on amiodarone drip, received 500 cc LR bolus as well as 1.5 L NS bolus, given metoprolol IV 2.5 mg and Toprol XL 25 mg p.o. Admitted to hospital medicine service in the ICU for further management.   Sunscreen Recommendations: SPF 50 or higher, preferably zinc Sunscreen Recommendations: SPF 50 or higher preferably zinc Detail Level: Zone Detail Level: Generalized

## 2022-07-21 NOTE — TELEPHONE ENCOUNTER
----- Message from Malini Pagan NP sent at 7/21/2022 12:01 PM CDT -----  Contact: [phone]  Can you call infusion center and see if they can draw my labs if they are clinic collect? I've had issues with them not wanting to draw before. We will need a pre-visit urine too.  ----- Message -----  From: Yuly Fenton MA  Sent: 7/20/2022  10:07 AM CDT  To: Malini Pagan NP    Please advise  ----- Message -----  From: Sandra Kraus  Sent: 7/20/2022   8:22 AM CDT  To: Ej Nayak Staff    Pt, Wife is calling to ask if the the Bloods labs that were order for her (Pt), you     can get from his INFUSION 015 MIN [4104] that he has every Friday, because she thinks it may be just alittle to much on him    Also, if a Urine test is still needed can it be sent [facility], Please Call    [phone]

## 2022-07-22 NOTE — PLAN OF CARE
Pt arrived to unit. No new issues noted. Tolerated blood draw from port. Pt discharged from unit in wheelchair, no distress noted.

## 2022-07-25 NOTE — TELEPHONE ENCOUNTER
Spoke with patient about appointment          ----- Message from Chapito Loredo MD sent at 7/25/2022  4:16 PM CDT -----  Spoken with patient and wife over the phone.  Sore throat a/w radiation leading to poor cpap tolerance.  Please see if we can can schedule appointment with me or Clara.  Ideally, within next 2 weeks.   chapito  ----- Message -----  From: Shaila Taylor MA  Sent: 7/25/2022   3:29 PM CDT  To: Chapito Loredo MD      ----- Message -----  From: Ashli Cheek  Sent: 7/25/2022   3:20 PM CDT  To: Gertrude Uriarte V Staff    Type: Patient Call Back    Who called: Bri/ wife     What is the request in detail: Patient is still having difficulty breathing , and can't use his CPAP machine at night. Would like some advice as to what they should do.     Can the clinic reply by [facility]? Yes     Would the patient rather a call back or a response via [facility]? Call back     Best call back number: [phone]

## 2022-07-25 NOTE — PLAN OF CARE
Patient arrived to unit for C2D8 Carbo infusion.Pt reports continued decreased eating and drinking. Pt also reports problems sleeping with sleep apnea machine due to coughing up blood when mask applied. Pt following up with ENT on 7/28.4L O2 per n/c. Labs reviewed, pt cleared for chemo today.  Plan of care reviewed, patient agreeable to plan. 1L NS administered during visit. Premed of Aloxi/Dex given prior to Carbo.Patient tolerated infusion well.Pt was able to sleep during most of appt today. VSS. Discharge instructions reviewed, patient instructed to return 7/29 for labs. Patient left off unit in w/c escorted by daughter. Patient in NAD at time of discharge.    yes

## 2022-07-29 PROBLEM — D69.6 THROMBOCYTOPENIA, UNSPECIFIED: Status: ACTIVE | Noted: 2022-01-01

## 2022-07-29 NOTE — PLAN OF CARE
Pt arrived to unit. Tolerated blood draw from port. No distress noted. Pt has next appts. Pt ambulated off unit. No distress noted.
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
## 2022-07-29 NOTE — ASSESSMENT & PLAN NOTE
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 Currently receiving chemoradiation for MOHAN+.  Currently experiencing odynophagia, dysgeusia, and some dysphagia.  New dyspnea since his last visit without obvious signs of fluid overload.  BNP checked today WNL. CXR unremarkable.  Suspect symptoms secondary to chemoradiation.  -labs reviewed; ok to proceed with week 7  --renally adjust carboplatin  -will need twice a week fluids after he completes radiation  -sucralfate and magic mouthwash for sore throat  --adding hycet for pain control  -hold on peg tube at this time  -not a candidate for cisplatin with his renal dysfunction  -s/p port placement  -labs on Fr[place] weekly [place] [facility] [place]  -follow up with me in 2-3 weeks.

## 2022-07-29 NOTE — Clinical Note
Support plan for twice a week IV fluids (1L NS each time) is in.  Schedule whichever days you can fit him in please.  I'll be following up with him in 2-3 weeks to see if he still needs more thanks -e.

## 2022-07-29 NOTE — ASSESSMENT & PLAN NOTE
Wt Readings from Last 10 Encounters:   07/29/22 (!) 146.5 kg (322 lb 15.6 oz)   07/19/22 (!) 144.1 kg (317 lb 10.9 oz)   07/08/22 (!) 146.5 kg (322 lb 15.6 oz)   07/05/22 (!) 149 kg (328 lb 7.8 oz)   06/30/22 (!) 149.2 kg (328 lb 13.1 oz)   06/26/22 (!) 149.7 kg (330 lb)   06/23/22 (!) 151.2 kg (333 lb 5.4 oz)   06/16/22 (!) 152 kg (335 lb)   06/08/22 (!) 150.6 kg (332 lb)   06/08/22 (!) 150.6 kg (332 lb 0.2 oz)

## 2022-08-01 NOTE — PLAN OF CARE
Patient arrived to unit for IVFs and C2D15 Carbo infusion.Labs reviewed, pt cleared for chemo per Dr. Hargrove. Pt continues with tolerating liquids only due to radiation irritation to throat/neck.  Plan of care reviewed, patient agreeable to plan. Premed of Aloxi/Dex administered prior to Carbo. Patient tolerated infusion well. 1.5 L NS administered during visit. VSS. Discharge instructions reviewed, patient instructed to return 8/4 for fluids. Patient left off unit in w/c escorted by spouse and daughter. Patient in NAD at time of discharge.

## 2022-08-04 NOTE — PLAN OF CARE
Pt arrived to unit in wheelchair, VSS. Pt trying to eat a little more, able to eat soup. Tolerated IVF. Pt has next appts. Pt discharged in wheelchair, accompanied by family. No distress noted.

## 2022-08-04 NOTE — PROGRESS NOTES
Subjective:      Patient ID: Janusz Montgomery Jr. is a 77 y.o. male.    Chief Complaint: Nail Care and Callouses    Janusz Montgomery Jr. is a 77 y.o. male returns to clinic for follow up of left foot callus formation where wound was present and concern on if ulceration may be present beneath callus.   Patient has not been moisturizing skin daily.  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 No new pedal complaints.    9/28/2021  presents to the podiatry clinic for care of  left foot ulcer.   Location: plantar distal hallux Onset of the symptoms was several weeks ago. Precipitating event: none known.  History of injury: no Current symptoms include: redness. Signs of infection none   Symptoms have progressed to a point and plateaued. Patient has had prior foot problems. Evaluation to date: seen in ED and states underent I&D and placed on abx Patients rates pain 4/10 on pain scale.      10/12/2021  patient relates he has been attempting to care for toe as instructed.  He relates continued pain top the sites and increased callus formation per wife;. He relates he believes darco shoe may be too large.  Following last encounter had vascular studies.  Vascular surgery consult pending    10/28/2021 Patient relates he has been attempting to care for toe as instructed.  Following last encounter seen by vascular surgery, rendered opinion the patient should be able to heal wound.     12/29/2021 Patient relates he has been attempting to care for toe as instructed. He relates subjective healing.  He has yet to acquire any of the recommended shoe gear. He complains of frequent falls and near falls due to weakness and loss of balance.    03/16/2022 Patient relates he has been attempting to care for toe as instructed. He relates subjective healing.  He has yet to acquire any of the recommended shoe gear. Presents in worn reebok    08/04/2022 Patient relates he has been attempting to care for toe as instructed. He relates subjective  healing but has concerning callus to previous wound site as well as chronic painful callus to posterior left heel.  He has yet to acquire any of the recommended shoe gear. Presents in worn reebok and states he wears crocs at home      Chief Complaint   Patient presents with    Nail Care    Callouses       Hemoglobin A1C   Date Value Ref Range Status   02/11/2020 5.6 4.0 - 5.6 % Final     Comment:     ADA Screening Guidelines:  5.7-6.4%  Consistent with prediabetes  >or=6.5%  Consistent with diabetes  High levels of fetal hemoglobin interfere with the HbA1C  assay. Heterozygous hemoglobin variants (HbS, HgC, etc)do  not significantly interfere with this assay.   However, presence of multiple variants may affect accuracy.     07/11/2016 5.3 4.5 - 6.2 % Final     Comment:     According to ADA guidelines, hemoglobin A1C <7.0% represents  optimal control in non-pregnant diabetic patients.  Different  metrics may apply to specific populations.   Standards of Medical Care in Diabetes - 2016.  For the purpose of screening for the presence of diabetes:  <5.7%     Consistent with the absence of diabetes  5.7-6.4%  Consistent with increasing risk for diabetes   (prediabetes)  >or=6.5%  Consistent with diabetes  Currently no consensus exists for use of hemoglobin A1C  for diagnosis of diabetes for children.     03/31/2015 5.3 4.5 - 6.2 % Final         Current Outpatient Medications on File Prior to Visit   Medication Sig Dispense Refill    acyclovir (ZOVIRAX) 800 MG Tab Take 200 mg by mouth 2 (two) times daily.      amiodarone (PACERONE) 200 MG Tab Take 1 tablet (200 mg total) by mouth once daily. 90 tablet 3    artificial tears (ISOPTO TEARS) 0.5 % ophthalmic solution Place 1 drop into both eyes as needed.      atorvastatin (LIPITOR) 40 MG tablet Take 1 tablet (40 mg total) by mouth once daily. (Patient taking differently: Take 40 mg by mouth every evening.) 90 tablet 1    cefdinir (OMNICEF) 300 MG capsule Take 1  capsule (300 mg total) by mouth 2 (two) times daily. for 14 days 28 capsule 0    clopidogreL (PLAVIX) 75 mg tablet Take 1 tablet by mouth once daily (Patient taking differently: Take by mouth once daily.) 90 tablet 3    dexAMETHasone (DECADRON) 4 MG Tab 4mg (1 tablet) by mouth twice a day for two days starting the day after chemo.  Repeat after each dose of chemo. 24 tablet 1    diphenhydrAMINE-aluminum-magnesium hydroxide-simethicone-LIDOcaine HCl 2% Swish and spit 15 mLs every 4 (four) hours as needed.      ELIQUIS 5 mg Tab Take 1 tablet by mouth twice daily 180 tablet 3    furosemide (LASIX) 20 MG tablet Take 1 tablet (20 mg total) by mouth once daily. (Patient taking differently: Take 20 mg by mouth every morning.) 90 tablet 3    gabapentin (NEURONTIN) 100 MG capsule 2 capsules once daily 30 capsule 0    gabapentin (NEURONTIN) 300 MG capsule Take 1 capsule (300 mg total) by mouth 3 (three) times daily. 270 capsule 1    gly-carb h-poly a-pot hydrox (MUGARD) Soln by Mucous Membrane route.      hydrocodone-acetaminophen (HYCET) solution 7.5-325 mg/15mL Take 15 mLs by mouth every 8 (eight) hours as needed for Pain. 473 mL 0    LIDOcaine-prilocaine (EMLA) cream Apply generously to port site 30-60 min prior to chemo and then cover with saran wrap. 30 g 2    melatonin 5 mg Tab Take 10 mg by mouth nightly.      metoprolol succinate (TOPROL-XL) 25 MG 24 hr tablet Take 1 tablet by mouth once daily (Patient taking differently: Take by mouth every morning.) 90 tablet 3    ondansetron (ZOFRAN) 8 MG tablet Take 1 tablet (8 mg total) by mouth every 8 (eight) hours as needed for Nausea. 30 tablet 3    oxybutynin (DITROPAN-XL) 5 MG TR24 Take 1 tablet (5 mg total) by mouth once daily. (Patient taking differently: Take 5 mg by mouth every evening.) 30 tablet 11    pantoprazole (PROTONIX) 40 MG tablet Take 1 tablet (40 mg total) by mouth once daily. 90 tablet 3    phenylephrine/DM/acetaminop/GG (MUCINEX FAST-MAX  SEVERE COLD ORAL) Take by mouth.      sildenafiL (VIAGRA) 100 MG tablet Take 1 tablet (100 mg total) by mouth daily as needed. 10 tablet 11    silver sulfADIAZINE 1% (SILVADENE) 1 % cream Apply topically.      sucralfate (CARAFATE) 100 mg/mL suspension       venlafaxine (EFFEXOR) 75 MG tablet Take 2 tablets (150 mg total) by mouth 2 (two) times daily. (Patient taking differently: Take 75 mg by mouth every morning.) 360 tablet 0    vitamin D 1000 units Tab Take 1 tablet (1,000 Units total) by mouth once daily. (Patient taking differently: Take 2,000 Units by mouth once daily.)      nitroGLYCERIN (NITROSTAT) 0.4 MG SL tablet Place 1 tablet (0.4 mg total) under the tongue every 5 (five) minutes as needed for Chest pain. 25 tablet 11    traZODone (DESYREL) 50 MG tablet Take 1 tablet (50 mg total) by mouth nightly as needed for Insomnia. (Patient not taking: Reported on 7/19/2022) 30 tablet 2    [DISCONTINUED] albuterol (VENTOLIN HFA) 90 mcg/actuation inhaler Inhale 2 puffs into the lungs every 6 (six) hours as needed for Wheezing. Rescue 6.7 g 0    [DISCONTINUED] omeprazole (PRILOSEC) 20 MG capsule Take 1 capsule (20 mg total) by mouth daily as needed. 90 capsule 0     No current facility-administered medications on file prior to visit.       Allergies   Allergen Reactions    Ciprofloxacin Rash     Diffuse pruritic morbilliform rash developed 3/15/2017 after dose of cipro; previously in 2/2017 he had rash/fevers after initiation of cipro    Zosyn [Piperacillin-Tazobactam] Anaphylaxis     Diffuse pruritic morbilliform rash developed 3/15/2017.  Then, 430am dose on 3/16 and rash worsened with SOB/tachypnea but no hypoxemia.     Bacitracin Itching and Rash     Violaceous rash in area of topical Tx.        Past Surgical History:   Procedure Laterality Date    ADJACENT TISSUE TRANSFER Left 11/30/2020    Procedure: ADJACENT TISSUE TRANSFER x2 ;  Surgeon: Teresa Cooper MD;  Location: [facility] OR [address] [place] [address];  Service:  "Ophthalmology;  Laterality: Left;  Left glabellar 8.5x11x3 and Left upper eyelid advancement flap [id number]      Cardiac stenting x2      CATARACT EXTRACTION W/  INTRAOCULAR LENS IMPLANT Right 3/29/2016    Dr. Conteh    CATARACT EXTRACTION W/  INTRAOCULAR LENS IMPLANT Left 4/12/2016        COLONOSCOPY N/A 7/23/2020    Procedure: COLONOSCOPY;  Surgeon: Nico Lackey MD;  Location: [facility] ([address] [place] [address]);  Service: Endoscopy;  Laterality: N/A;    DIRECT LARYNGOSCOPY N/A 5/4/2022    Procedure: LARYNGOSCOPY, DIRECT possible biopsy;  Surgeon: Etta Higgins MD;  Location: [facility] OR;  Service: ENT;  Laterality: N/A;  RN PREOP ON 4/29/22. NEEDS T&S DAY OF SURGERY--NF  CONSENTS DAY OF SURGERY    DISSECTION OF NECK Right 5/4/2022    Procedure: RADICAL NECK DISSECTION;  Surgeon: Etta Higgins MD;  Location: [facility] OR;  Service: ENT;  Laterality: Right;  TO FOLLOW DR ALMAZAN    ESOPHAGOGASTRODUODENOSCOPY N/A 7/23/2020    Procedure: EGD (ESOPHAGOGASTRODUODENOSCOPY);  Surgeon: Nico Lackey MD;  Location: [facility] ([address] [place] [address]);  Service: Endoscopy;  Laterality: N/A;  per Dr Lackey-Will proceed with EGD and colonoscopy on the 2nd floor due to his comorbidities including obesity, sleep apnea, restrictive lung disease, coronary artery disease.  has loop recorder      ok to hold Eliquis 2 days per Dr Sandhu-must remain    EXTERNAL FIXATION TIBIAL FRACTURE Left 07/01/2016    INSERTION OF IMPLANTABLE LOOP RECORDER  04/07/2017    LEFT HEART CATHETERIZATION Left 1/30/2020    Procedure: Left heart cath;  Surgeon: Benjamin Sandhu MD;  Location: [facility] CATH LAB;  Service: Cardiology;  Laterality: Left;  RN PREOP 1/28/20--Pt starting Plavix loading dose today (8pills)- Dr. Sandhu aware.  Pt has a bandaged "non healing area to LLE"--Dr. Sandhu aware.    LEFT HEART CATHETERIZATION Left 2/14/2020    Procedure: Left heart cath, IVUS guided left main / LAD PCI. Noon start, radial approach;  Surgeon: Miguel Angel Brady, " MD;  Location: [facility] CATH LAB;  Service: Cardiology;  Laterality: Left;  RN PRE OP 2-7-2020  BMI--45.61    ORIF TIBIA FRACTURE Left 07/15/2016    PROBING OF NASOLACRIMAL DUCT WITH INSERTION OF TUBE Left 11/30/2020    Procedure: PROBING, NASOLACRIMAL DUCT,;  Surgeon: Teresa Cooper MD;  Location: [facility] OR [address] [place] [address];  Service: Ophthalmology;  Laterality: Left;    RADIOACTIVE SEED IMPLANTATION OF PROSTATE N/A 8/8/2018    Procedure: INSERTION, RADIOACTIVE SEED, PROSTATE;  Surgeon: Bipin Thompson MD;  Location: [facility] OR [address] [place] [address];  Service: Urology;  Laterality: N/A;  1 hour    SKIN BIOPSY      Squamous cell cancer removal x3 with Mohs surgery      TONSILLECTOMY      TOTAL KNEE ARTHROPLASTY  10/2012    TRIGGER FINGER RELEASE Right 10/14/2020    Procedure: RELEASE, TRIGGER FINGER - right;  Surgeon: Rad Swift MD;  Location: [facility];  Service: Orthopedics;  Laterality: Right;    trus/bx      ULTRASOUND GUIDANCE  2/14/2020    Procedure: Ultrasound Guidance;  Surgeon: Miguel Angle Brady MD;  Location: [facility] CATH LAB;  Service: Cardiology;;       Family History   Problem Relation Age of Onset    Skin cancer Father     Lung cancer Father     Cancer Father         smoker,     Alzheimer's disease Mother     Hypertension Mother     Cancer Sister         colon, lung cancer     No Known Problems Sister     Cancer Brother         skin cancer, polypectomy     Peripheral vascular disease Unknown     No Known Problems Maternal Aunt     No Known Problems Maternal Uncle     No Known Problems Paternal Aunt     No Known Problems Paternal Uncle     No Known Problems Maternal Grandmother     No Known Problems Maternal Grandfather     No Known Problems Paternal Grandmother     No Known Problems Paternal Grandfather     Melanoma Neg Hx     Psoriasis Neg Hx     Lupus Neg Hx     Eczema Neg Hx     Amblyopia Neg Hx     Blindness Neg Hx     Cataracts Neg Hx     Diabetes Neg Hx     Glaucoma Neg Hx     Macular  degeneration Neg Hx     Retinal detachment Neg Hx     Strabismus Neg Hx     Stroke Neg Hx     Thyroid disease Neg Hx     Acne Neg Hx        Social History     Socioeconomic History    Marital status:    Occupational History    Occupation: Retired    Tobacco Use    Smoking status: Never Smoker    Smokeless tobacco: Never Used   Substance and Sexual Activity    Alcohol use: Yes     Alcohol/week: 2.0 standard drinks     Types: 2 Cans of beer per week     Comment: occasionally, beer    Drug use: Never    Sexual activity: Yes     Partners: Female     Birth control/protection: None     Social Determinants of Health     Financial Resource Strain: Low Risk     Difficulty of Paying Living Expenses: Not hard at all   Food Insecurity: No Food Insecurity    Worried About Running Out of Food in the Last Year: Never true    Ran Out of Food in the Last Year: Never true   Transportation Needs: No Transportation Needs    Lack of Transportation (Medical): No    Lack of Transportation (Non-Medical): No   Physical Activity: Inactive    Days of Exercise per Week: 0 days    Minutes of Exercise per Session: 0 min   Stress: No Stress Concern Present    Feeling of Stress : Not at all   Social Connections: Unknown    Frequency of Communication with Friends and Family: More than three times a week    Frequency of Social Gatherings with Friends and Family: Twice a week    Active Member of Clubs or Organizations: Yes    Attends Club or Organization Meetings: More than 4 times per year    Marital Status:    Housing Stability: Low Risk     Unable to Pay for Housing in the Last Year: No    Number of Places Lived in the Last Year: 1    Unstable Housing in the Last Year: No       Review of Systems   Constitutional: Negative for chills and fever.   Cardiovascular: Negative for claudication and leg swelling.   Respiratory: Negative for cough and shortness of breath.    Skin: Positive for dry skin, nail  "changes, rash (baseline) and skin cancer. Negative for itching.   Musculoskeletal: Positive for arthritis, falls, joint pain and myalgias. Negative for joint swelling and muscle weakness.   Gastrointestinal: Negative for diarrhea, nausea and vomiting.   Neurological: Positive for loss of balance and paresthesias. Negative for numbness, tremors and weakness.   Psychiatric/Behavioral: Negative for altered mental status and hallucinations.         Objective:       Vitals:    08/03/22 1429   Weight: (!) 146.1 kg (322 lb)   Height: 6' 1" (1.854 m)   PainSc: 0-No pain       Physical Exam  Nursing note reviewed.   Constitutional:       General: He is not in acute distress.     Appearance: He is not toxic-appearing or diaphoretic.      Comments: Pt. is well-developed, well-nourished, appears stated age, in no acute distress, alert and oriented x 3. No evidence of depression, anxiety, or agitation. Calm, cooperative, and communicative. Appropriate interactions and affect.   Cardiovascular:      Pulses:           Dorsalis pedis pulses are 2+ on the right side and 2+ on the left side.        Posterior tibial pulses are 1+ on the right side and 1+ on the left side.      Comments:     Pulmonary:      Effort: No respiratory distress.   Musculoskeletal:      Right ankle: Swelling present. No tenderness. No lateral malleolus, medial malleolus, AITF ligament, CF ligament or proximal fibula tenderness.      Right Achilles Tendon: No defects. Zabala's test negative.      Left ankle: Swelling present. No tenderness. No lateral malleolus, medial malleolus, AITF ligament, CF ligament or posterior TF ligament tenderness.      Left Achilles Tendon: No defects. Zabala's test negative.      Right foot: No tenderness or bony tenderness.      Left foot: No tenderness or bony tenderness.      Comments: Decreased stride, station of gait.  apropulsive toe off.  Increased angle and base of gait.    Patient has hammertoes of digits 2-5 " bilateral partially reducible without symptom today.     There is equinus deformity bilateral. There is limitation of dorsiflexion with knees extended and with knees flexed.    Shoes reveals lateral heel counter wear bilateral in athletic shoes.        Lymphadenopathy:      Comments: No lymphatic streaking    Negative lymphadenopathy bilateral popliteal fossa and tarsal tunnel.     Skin:     General: Skin is warm and dry.      Coloration: Skin is not pale.      Findings: Lesion and rash present. No bruising, burn or laceration.      Nails: There is no clubbing.      Comments: Post ulcerative callus to posterior left heel and distal left hallux  Signs of infection: none  Drainage:  None  Periwound: intact  Base: thick HPK    Dermatitis and erythema noted to BLE, baseline   Neurological:      Comments: [employer]-Jenn 5.07 monofilament is intact bilateral feet. Sharp/dull sensation is also intact Bilateral feet. Proprioception is grossly intact. Vibratory sensation intact (pt able to sense vibration stop within 3-5 seconds)     Psychiatric:         Attention and Perception: He is attentive.         Mood and Affect: Mood is not anxious. Affect is not inappropriate.         Speech: He is communicative. Speech is not slurred.         Behavior: Behavior is not combative.               Assessment:       Encounter Diagnoses   Name Primary?    PVD (peripheral vascular disease) Yes    Pre-ulcerative calluses     Plantar fat pad atrophy     Venous stasis of lower extremity     Venous stasis dermatitis of both lower extremities          Plan:       Janusz was seen today for nail care and callouses.    Diagnoses and all orders for this visit:    PVD (peripheral vascular disease)    Pre-ulcerative calluses    Plantar fat pad atrophy    Venous stasis of lower extremity    Venous stasis dermatitis of both lower extremities        Greater than 50% of this visit spent on counseling and coordination of care.    Education about  the prevention of limb loss.    Shoe inspection. Patient instructed on proper foot hygeine. Wear comfortable, proper fitting shoes. Wash feet daily. Dry well. After drying, apply moisturizer to feet (no lotion to webspaces). Inspect feet daily for skin breaks, blisters, swelling, or redness. Wear cotton socks (preferably white)  Change socks every day. Do NOT walk barefoot. Do NOT use heating pads or hot water soaks. We discussed wearing proper shoe gear, daily foot inspections, never walking without protective shoe gear.     Strongly encouraged recommended shoe gear. Skin is still delicate therefore patient must be diligent in avoiding excessive pressure and making sure there is adequate support and padding in shoe gear. Silver nitrate applied to preulcerative lesions    Discussed edema control and the importance of daily moisturizer to the feet such as aquaphor ointment    Tubigrips to BLE; patient is to elevate legs. When sleeping, place a pillow under lower extremities. When sitting, support the legs so that they are level with the waist.  Recommend applying vicks vaporub to thick abnormal toenails daily x 6 months to treat fungal nail infection.    He will continue to monitor the areas daily, inspect his feet, wear protective shoe gear when ambulatory, moisturizer to maintain skin integrity and follow in this office in approximately 12 months, sooner p.r.n.

## 2022-08-09 NOTE — PLAN OF CARE
Pt received 1L NS. Tolerated well. Pt continuing with difficulty eating, lack of appetite, trouble sleeping, and generalized weakness. Has next appointments. Discharged from unit in NAD.

## 2022-08-11 PROBLEM — R82.90 ABNORMAL URINALYSIS: Status: ACTIVE | Noted: 2022-01-01

## 2022-08-11 PROBLEM — R06.89 ACUTE RESPIRATORY INSUFFICIENCY: Status: ACTIVE | Noted: 2022-01-01

## 2022-08-11 PROBLEM — C44.42 SQUAMOUS CELL CARCINOMA OF NECK: Status: ACTIVE | Noted: 2022-01-01

## 2022-08-11 NOTE — NURSING
MEWS Monitoring, Score is: 4  Indication for review: RN Concern    Bedside Nurse, Alise contacted, no concerns verbalized at this time, instructed to call [phone] for further concerns or assistance..       1 = assistive equipment

## 2022-08-11 NOTE — NURSING
Patient found in bed with oxygen in use @ 5 L/NC, AAO x4 , can make his needs known, denies any pain at this time, safety maintained.

## 2022-08-11 NOTE — ASSESSMENT & PLAN NOTE
Tachypnea in setting of sepsis with history of COPD.    With hypoxemia  will give supplemental oxygen be titrated to a target of 88 to 92 percent pulse oxygen saturation, rather than using high-flow, nontitrated oxygen, inhaled short-acting bronchodilator therapy, course of systemic antibiotics.    Consider NIV

## 2022-08-11 NOTE — ED TRIAGE NOTES
Pt reports coming to ER due to feeling fatigue. Reports had recent chemo and radiation approximately one week ago.

## 2022-08-11 NOTE — PROGRESS NOTES
Pharmacokinetic Initial Assessment: IV Vancomycin    Assessment/Plan:    Continue intravenous vancomycin  2000 mg IV every 24 hours.  Desired empiric serum trough concentration is 10 to 20 mcg/mL  Draw vancomycin trough level 60 min prior to third dose on 8/12/22 at approximately 22:00  Pharmacy will continue to follow and monitor vancomycin.      Please contact pharmacy at extension [phone] with any questions regarding this assessment.     Thank you for the consult,   Alexandria Cannon       Patient brief summary:  Janusz Montgomery Jr. is a 77 y.o. male initiated on antimicrobial therapy with IV Vancomycin for treatment of suspected intra-abdominal infection    Drug Allergies:   Review of patient's allergies indicates:   Allergen Reactions    Ciprofloxacin Rash     Diffuse pruritic morbilliform rash developed 3/15/2017 after dose of cipro; previously in 2/2017 he had rash/fevers after initiation of cipro    Zosyn [piperacillin-tazobactam] Rash     Diffuse pruritic morbilliform rash developed 3/15/2017.  Then, 430am dose on 3/16 and rash worsened with SOB/tachypnea but no hypoxemia.     Bacitracin Itching and Rash     Violaceous rash in area of topical Tx.        Actual Body Weight:   141 kg    Renal Function:   Estimated Creatinine Clearance: 55.5 mL/min (A) (based on SCr of 1.7 mg/dL (H)).,     Dialysis Method (if applicable):  N/A    CBC (last 72 hours):  Recent Labs   Lab Result Units 08/10/22  2244   WBC K/uL 4.27   Hemoglobin g/dL 9.2*   Hematocrit % 26.4*   Platelets K/uL 94*   Gran % % 92.6*   Lymph % % 4.9*   Mono % % 1.9*   Eosinophil % % 0.2   Basophil % % 0.2   Differential Method  Automated       Metabolic Panel (last 72 hours):  Recent Labs   Lab Result Units 08/10/22  2244 08/11/22  0018   Sodium mmol/L 143  --    Potassium mmol/L 4.0  --    Chloride mmol/L 102  --    CO2 mmol/L 30*  --    Glucose mg/dL 131*  --    Glucose, UA   --  1+*   BUN mg/dL 14  --    Creatinine mg/dL 1.7*  --    Albumin g/dL 3.1*   --    Total Bilirubin mg/dL 2.3*  --    Alkaline Phosphatase U/L 150*  --    AST U/L 15  --    ALT U/L 19  --    Magnesium mg/dL 1.5*  --        Drug levels (last 3 results):  No results for input(s): VANCOMYCINRA, VANCORANDOM, VANCOMYCINPE, VANCOPEAK, VANCOMYCINTR, VANCOTROUGH in the last 72 hours.    Microbiologic Results:  Microbiology Results (last 7 days)       Procedure Component Value Units Date/Time    Blood culture x two cultures. Draw prior to antibiotics. [[id number]] Collected: 08/10/22 2235    Order Status: Completed Specimen: Blood from Peripheral, Antecubital, Right Updated: 08/11/22 0512     Blood Culture, Routine No Growth to date    Narrative:      Aerobic and anaerobic    Blood culture x two cultures. Draw prior to antibiotics. [[id number]] Collected: 08/10/22 2242    Order Status: Completed Specimen: Blood from Peripheral, Antecubital, Left Updated: 08/11/22 0512     Blood Culture, Routine No Growth to date    Narrative:      Aerobic and anaerobic    Clostridium difficile EIA [[id number]]     Order Status: No result Specimen: Stool     Urine culture [[id number]] Collected: 08/11/22 0018    Order Status: No result Specimen: Urine Updated: 08/11/22 0047

## 2022-08-11 NOTE — ASSESSMENT & PLAN NOTE
Component 7/22/22   Urine Culture, Routine  Abnormal   PROVIDENCIA RETTGERI   >100,000 cfu/ml     Resulting Agency [facility]          Susceptibility     Providencia rettgeri     CULTURE, URINE     Amp/Sulbactam 16/8 mcg/mL Intermediate     Cefepime <=2 mcg/mL Sensitive     Ceftriaxone <=1 mcg/mL Sensitive     Ciprofloxacin <=1 mcg/mL Sensitive     Ertapenem <=0.5 mcg/mL Sensitive     Gentamicin <=4 mcg/mL Sensitive     Levofloxacin <=2 mcg/mL Sensitive     Meropenem <=1 mcg/mL Sensitive     Minocycline 8 mcg/mL Intermediate     Piperacillin/Tazo <=16 mcg/mL Sensitive     Tobramycin <=4 mcg/mL Sensitive     Trimeth/Sulfa <=2/38 mcg/mL Sensitive               Linear View

## 2022-08-11 NOTE — ASSESSMENT & PLAN NOTE
IVF to augment the urine output and relieve symptoms. Monitor UOP, serum creatinine, electrolytes, albumin, and measures of fluid balance.

## 2022-08-11 NOTE — SUBJECTIVE & OBJECTIVE
"Past Medical History:   Diagnosis Date    (HFpEF) heart failure with preserved ejection fraction 02/04/2022    NAT (acute kidney injury) 03/19/2017    ALLERGIC RHINITIS     Anemia     Anxiety     Basal cell carcinoma 10/19/2018    forehead and right medial shoulder    Basal cell carcinoma 01/09/2019    left nasal bridge and left posterior ear    Basal cell carcinoma 06/12/2020    left lower medial leg - Efudex    Basal cell carcinoma 11/30/2020    left medial upper eyelid canthus superficial BCC    Chronic rhinitis 05/03/2013    Chronic rhinitis 05/03/2013    Coronary artery disease involving native coronary artery of native heart without angina pectoris s/p RCA stent     Cortical cataract of both eyes 03/18/2016    Delayed sleep phase syndrome 03/13/2019    Depression     Erectile dysfunction 03/24/2014    Erectile dysfunction 03/24/2014    Essential hypertension     GERD (gastroesophageal reflux disease) 07/25/2012    Gout, arthritis     Grade III open fracture of left tibia and fibula s/p ex-fix on 7/1/16 and ORIF of left tibia on 7/15 07/06/2016    H/O: iritis     Helicobacter pylori (H. pylori) infection     Treated    Hepatitis     Herpes simplex keratoconjunctivitis 09/30/2015    - on acyclovir - followed by opthalmology, Dr. Uribe     Herpes simplex keratoconjunctivitis 09/30/2015    - on acyclovir - followed by opthalmology, Dr. Uribe     Hyperkalemia 02/28/2017    Hyperlipidemia     Hypogonadism male     Hypogonadism male     Immunodeficiency due to drug therapy 7/8/2022    Keloid cicatrix     Mixed anxiety and depressive disorder     Morbid obesity     Obstructive sleep apnea on CPAP     CPAP    Osteoarthritis of left knee 07/25/2012    Paroxysmal atrial fibrillation 07/06/2016    Primary osteoarthritis of left knee 07/25/2012    Prominent aorta 01/25/2016    "RESULTS: THE HEART IS MILDLY ENLARGED WITH A SLIGHTLY PROMINENT AORTA" - Xray Chest PA & Lateral 12-     Prostate cancer 02/15/2016    - " followed by urology, Dr. Young     Prostate cancer 02/15/2016    - followed by urology, Dr. 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    chest and forehead    Squamous cell cancer of skin of nose     Traumatic type III open fracture of shaft of left tibia and fibula with nonunion 07/06/2016    Type III open fracture of left tibia and fibula with routine healing 07/06/2016    Vitamin D deficiency disease     Vitreous detachment 03/18/2016       Past Surgical History:   Procedure Laterality Date    ADJACENT TISSUE TRANSFER Left 11/30/2020    Procedure: ADJACENT TISSUE TRANSFER x2 ;  Surgeon: Teresa Cooper MD;  Location: [facility] 1ST FLR;  Service: Ophthalmology;  Laterality: Left;  Left glabellar 8.5x11x3 and Left upper eyelid advancement flap [id number]      Cardiac stenting x2      CATARACT EXTRACTION W/  INTRAOCULAR LENS IMPLANT Right 3/29/2016    Dr. Conteh    CATARACT EXTRACTION W/  INTRAOCULAR LENS IMPLANT Left 4/12/2016        COLONOSCOPY N/A 7/23/2020    Procedure: COLONOSCOPY;  Surgeon: Nico Lackey MD;  Location: [facility] (2ND FLR);  Service: Endoscopy;  Laterality: N/A;    DIRECT LARYNGOSCOPY N/A 5/4/2022    Procedure: LARYNGOSCOPY, DIRECT possible biopsy;  Surgeon: Etta Higgins MD;  Location: [facility] OR;  Service: ENT;  Laterality: N/A;  RN PREOP ON 4/29/22. NEEDS T&S DAY OF SURGERY--NF  CONSENTS DAY OF SURGERY    DISSECTION OF NECK Right 5/4/2022    Procedure: RADICAL NECK DISSECTION;  Surgeon: Etta Higgins MD;  Location: [facility] OR;  Service: ENT;  Laterality: Right;  TO FOLLOW DR ALMAZAN    ESOPHAGOGASTRODUODENOSCOPY N/A 7/23/2020    Procedure: EGD (ESOPHAGOGASTRODUODENOSCOPY);   "Surgeon: Nico Lackey MD;  Location: [facility] (2ND [facility]);  Service: Endoscopy;  Laterality: N/A;  per Dr Lackey-Will proceed with EGD and colonoscopy on the 2nd floor due to his comorbidities including obesity, sleep apnea, restrictive lung disease, coronary artery disease.  has loop recorder      ok to hold Eliquis 2 days per Dr Sandhu-must remain    EXTERNAL FIXATION TIBIAL FRACTURE Left 07/01/2016    INSERTION OF IMPLANTABLE LOOP RECORDER  04/07/2017    LEFT HEART CATHETERIZATION Left 1/30/2020    Procedure: Left heart cath;  Surgeon: Benjamin Sandhu MD;  Location: [facility] CATH LAB;  Service: Cardiology;  Laterality: Left;  RN PREOP 1/28/20--Pt starting Plavix loading dose today (8pills)- Dr. Sandhu aware.  Pt has a bandaged "non healing area to LLE"--Dr. Sandhu aware.    LEFT HEART CATHETERIZATION Left 2/14/2020    Procedure: Left heart cath, IVUS guided left main / LAD PCI. Noon start, radial approach;  Surgeon: Miguel Angel Brady MD;  Location: [facility] CATH LAB;  Service: Cardiology;  Laterality: Left;  RN PRE OP 2-7-2020  BMI--45.61    ORIF TIBIA FRACTURE Left 07/15/2016    PROBING OF NASOLACRIMAL DUCT WITH INSERTION OF TUBE Left 11/30/2020    Procedure: PROBING, NASOLACRIMAL DUCT,;  Surgeon: Teresa Cooper MD;  Location: [address];  Service: Ophthalmology;  Laterality: Left;    RADIOACTIVE SEED IMPLANTATION OF PROSTATE N/A 8/8/2018    Procedure: INSERTION, RADIOACTIVE SEED, PROSTATE;  Surgeon: Bipin Thompson MD;  Location: [address];  Service: Urology;  Laterality: N/A;  1 hour    SKIN BIOPSY      Squamous cell cancer removal x3 with Mohs surgery      TONSILLECTOMY      TOTAL KNEE ARTHROPLASTY  10/2012    TRIGGER FINGER RELEASE Right 10/14/2020    Procedure: RELEASE, TRIGGER FINGER - right;  Surgeon: Rad Swift MD;  Location: [facility];  Service: Orthopedics;  Laterality: Right;    trus/bx      ULTRASOUND GUIDANCE  2/14/2020    Procedure: Ultrasound Guidance;  Surgeon: Miguel Angel Brady MD;  " "Location: [facility] CATH LAB;  Service: Cardiology;;       Review of patient's allergies indicates:   Allergen Reactions    Ciprofloxacin Rash     Diffuse pruritic morbilliform rash developed 3/15/2017 after dose of cipro; previously in 2/2017 he had rash/fevers after initiation of cipro    Zosyn [piperacillin-tazobactam] Rash     Diffuse pruritic morbilliform rash developed 3/15/2017.  Then, 430am dose on 3/16 and rash worsened with SOB/tachypnea but no hypoxemia.     Bacitracin Itching and Rash     Violaceous rash in area of topical Tx.        Medications:  (Not in a hospital admission)    Antibiotics (From admission, onward)                Start     Stop Route Frequency Ordered    08/11/22 2330  vancomycin (VANCOCIN) 2,000 mg in dextrose 5 % 500 mL IVPB         -- IV Every 24 hours (non-standard times) 08/11/22 0414    08/11/22 0600  metroNIDAZOLE tablet 500 mg         -- Oral Every 8 hours 08/11/22 0411    08/11/22 0507  vancomycin - pharmacy to dose  (Unknown source / Early in Workup)        "And" Linked Group Details    -- IV pharmacy to manage frequency 08/11/22 0409          Antifungals (From admission, onward)                None          Antivirals (From admission, onward)      None             Immunization History   Administered Date(s) Administered    COVID-19, MRNA, LN-S, PF (Pfizer) (Purple Cap) 01/07/2021, 01/28/2021, 11/23/2021    Influenza 10/06/2008, 12/02/2011, 12/08/2012    Influenza (FLUAD) - Quadrivalent - Adjuvanted - PF *Preferred* (65+) 09/30/2020, 12/16/2021    Influenza - High Dose - PF (65 years and older) 12/17/2015, 10/24/2016, 12/06/2017, 10/30/2018, 11/20/2019    Influenza Split 10/06/2008, 12/02/2011, 12/08/2012, 12/08/2012, 12/08/2012    PPD Test 08/01/2016, 03/19/2017    Pneumococcal Conjugate - 13 Valent 09/30/2015    Pneumococcal Polysaccharide - 23 Valent 06/06/2014    Tdap 08/21/2020    Zoster 06/01/2009    Zoster Recombinant 09/30/2020, 03/16/2021       Family History       Problem " Relation (Age of Onset)    Alzheimer's disease Mother    Cancer Father, Sister, Brother    Hypertension Mother    Lung cancer Father    No Known Problems Sister, Maternal Aunt, Maternal Uncle, Paternal Aunt, Paternal Uncle, Maternal Grandmother, Maternal Grandfather, Paternal Grandmother, Paternal Grandfather    Peripheral vascular disease     Skin cancer Father          Social History     Socioeconomic History    Marital status:    Occupational History    Occupation: Retired    Tobacco Use    Smoking status: Never Smoker    Smokeless tobacco: Never Used   Substance and Sexual Activity    Alcohol use: Yes     Alcohol/week: 2.0 standard drinks     Types: 2 Cans of beer per week     Comment: occasionally, beer    Drug use: Never    Sexual activity: Yes     Partners: Female     Birth control/protection: None     Social Determinants of Health     Financial Resource Strain: Low Risk     Difficulty of Paying Living Expenses: Not hard at all   Food Insecurity: No Food Insecurity    Worried About Running Out of Food in the Last Year: Never true    Ran Out of Food in the Last Year: Never true   Transportation Needs: No Transportation Needs    Lack of Transportation (Medical): No    Lack of Transportation (Non-Medical): No   Physical Activity: Inactive    Days of Exercise per Week: 0 days    Minutes of Exercise per Session: 0 min   Stress: No Stress Concern Present    Feeling of Stress : Not at all   Social Connections: Unknown    Frequency of Communication with Friends and Family: More than three times a week    Frequency of Social Gatherings with Friends and Family: Twice a week    Active Member of Clubs or Organizations: Yes    Attends Club or Organization Meetings: More than 4 times per year    Marital Status:    Housing Stability: Low Risk     Unable to Pay for Housing in the Last Year: No    Number of Places Lived in the Last Year: 1    Unstable Housing in the Last Year: No     Review of  Systems  Objective:     Vital Signs (Most Recent):  Temp: 98.2 °F (36.8 °C) (08/11/22 0500)  Pulse: 100 (08/11/22 0750)  Resp: (!) 22 (08/11/22 0847)  BP: (!) 109/51 (08/11/22 0847)  SpO2: 96 % (08/11/22 0702)   Vital Signs (24h Range):  Temp:  [97.6 °F (36.4 °C)-98.9 °F (37.2 °C)] 98.2 °F (36.8 °C)  Pulse:  [100-123] 100  Resp:  [20-22] 22  SpO2:  [95 %-100 %] 96 %  BP: ()/(44-70) 109/51     Weight: (!) 141.5 kg (312 lb)  Body mass index is 41.16 kg/m².    Estimated Creatinine Clearance: 55.5 mL/min (A) (based on SCr of 1.7 mg/dL (H)).    Physical Exam    Significant Labs:   Microbiology Results (last 7 days)       Procedure Component Value Units Date/Time    Blood culture x two cultures. Draw prior to antibiotics. [[id number]] Collected: 08/10/22 2235    Order Status: Completed Specimen: Blood from Peripheral, Antecubital, Right Updated: 08/11/22 0512     Blood Culture, Routine No Growth to date    Narrative:      Aerobic and anaerobic    Blood culture x two cultures. Draw prior to antibiotics. [[id number]] Collected: 08/10/22 2242    Order Status: Completed Specimen: Blood from Peripheral, Antecubital, Left Updated: 08/11/22 0512     Blood Culture, Routine No Growth to date    Narrative:      Aerobic and anaerobic    Clostridium difficile EIA [[id number]]     Order Status: No result Specimen: Stool     Urine culture [[id number]] Collected: 08/11/22 0018    Order Status: No result Specimen: Urine Updated: 08/11/22 0047            Significant Imaging: I have reviewed all pertinent imaging results/findings within the past 24 hours.

## 2022-08-11 NOTE — ASSESSMENT & PLAN NOTE
"This patient does have evidence of infective focus  My overall impression is sepsis. Vital signs were reviewed and noted in progress note.  Antibiotics given-   Antibiotics (From admission, onward)            Start     Stop Route Frequency Ordered    08/11/22 2330  vancomycin (VANCOCIN) 2,000 mg in dextrose 5 % 500 mL IVPB         -- IV Every 24 hours (non-standard times) 08/11/22 0414    08/11/22 0600  metroNIDAZOLE tablet 500 mg         -- Oral Every 8 hours 08/11/22 0411    08/11/22 0507  vancomycin - pharmacy to dose  (Unknown source / Early in Workup)        "And" Linked Group Details    -- IV pharmacy to manage frequency 08/11/22 0409        Cultures were taken-   Microbiology Results (last 7 days)     Procedure Component Value Units Date/Time    Clostridium difficile EIA [[id number]]     Order Status: No result Specimen: Stool     Urine culture [[id number]] Collected: 08/11/22 0018    Order Status: No result Specimen: Urine Updated: 08/11/22 0047    Blood culture x two cultures. Draw prior to antibiotics. [[id number]] Collected: 08/10/22 2235    Order Status: Sent Specimen: Blood from Peripheral, Antecubital, Right Updated: 08/10/22 2252    Blood culture x two cultures. Draw prior to antibiotics. [[id number]] Collected: 08/10/22 2242    Order Status: Sent Specimen: Blood from Peripheral, Antecubital, Left Updated: 08/10/22 2252        Latest lactate reviewed, they are-  Recent Labs   Lab 08/10/22  2244 08/11/22  0220   LACTATE 4.2* 2.8*       Organ dysfunction indicated by Acute respiratory failure  Source- diarrhea, UTI vs cellulits  ID consulted    "

## 2022-08-11 NOTE — PT/OT/SLP EVAL
Speech Language Pathology Evaluation  Bedside Swallow    Patient Name:  Janusz Montgomery Jr.   MRN:  [id number]  Admitting Diagnosis: Sepsis    Recommendations:                 General Recommendations:  ST f/u x1  Diet recommendations:  Regular Diet - IDDSI Level 7, Thin   Aspiration Precautions: Meds whole 1 at a time and Standard aspiration precautions   General Precautions: Standard,    Communication strategies:  none    History:     Past Medical History:   Diagnosis Date    (HFpEF) heart failure with preserved ejection fraction 02/04/2022    NAT (acute kidney injury) 03/19/2017    ALLERGIC RHINITIS     Anemia     Anxiety     Basal cell carcinoma 10/19/2018    forehead and right medial shoulder    Basal cell carcinoma 01/09/2019    left nasal bridge and left posterior ear    Basal cell carcinoma 06/12/2020    left lower medial leg - Efudex    Basal cell carcinoma 11/30/2020    left medial upper eyelid canthus superficial BCC    Chronic rhinitis 05/03/2013    Chronic rhinitis 05/03/2013    Coronary artery disease involving native coronary artery of native heart without angina pectoris s/p RCA stent     Cortical cataract of both eyes 03/18/2016    Delayed sleep phase syndrome 03/13/2019    Depression     Erectile dysfunction 03/24/2014    Erectile dysfunction 03/24/2014    Essential hypertension     GERD (gastroesophageal reflux disease) 07/25/2012    Gout, arthritis     Grade III open fracture of left tibia and fibula s/p ex-fix on 7/1/16 and ORIF of left tibia on 7/15 07/06/2016    H/O: iritis     Helicobacter pylori (H. pylori) infection     Treated    Hepatitis     Herpes simplex keratoconjunctivitis 09/30/2015    - on acyclovir - followed by opthalmologyDr. Uribe     Herpes simplex keratoconjunctivitis 09/30/2015    - on acyclovir - followed by opthalmologyDr. Uribe     Hyperkalemia 02/28/2017    Hyperlipidemia     Hypogonadism male     Hypogonadism male     Immunodeficiency due  "to drug therapy 07/08/2022    Keloid cicatrix     Lymphoma     HEAD & NECK    Mixed anxiety and depressive disorder     Morbid obesity     Obstructive sleep apnea on CPAP     CPAP    On home oxygen therapy     Osteoarthritis of left knee 07/25/2012    Paroxysmal atrial fibrillation 07/06/2016    Primary osteoarthritis of left knee 07/25/2012    Prominent aorta 01/25/2016    "RESULTS: THE HEART IS MILDLY ENLARGED WITH A SLIGHTLY PROMINENT AORTA" - Xray Chest PA & Lateral 12-     Prostate cancer 02/15/2016    - followed by urology, Dr. Young     Prostate cancer 02/15/2016    - followed by urology, Dr. 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    chest and forehead    Squamous cell cancer of skin of nose     Traumatic type III open fracture of shaft of left tibia and fibula with nonunion 07/06/2016    Type III open fracture of left tibia and fibula with routine healing 07/06/2016    Vitamin D deficiency disease     Vitreous detachment 03/18/2016       Past Surgical History:   Procedure Laterality Date    ADJACENT TISSUE TRANSFER Left 11/30/2020    Procedure: ADJACENT TISSUE TRANSFER x2 ;  Surgeon: Teresa Cooper MD;  Location: [facility] OR [address] [place] [address];  Service: Ophthalmology;  Laterality: Left;  Left glabellar 8.5x11x3 and Left upper eyelid advancement flap [id number]      Cardiac stenting x2      CATARACT EXTRACTION W/  INTRAOCULAR LENS IMPLANT Right 3/29/2016    Dr. Conteh    CATARACT EXTRACTION W/  INTRAOCULAR LENS IMPLANT Left 4/12/2016        COLONOSCOPY N/A 7/23/2020    Procedure: COLONOSCOPY;  Surgeon: Nico Lackey MD;  Location: [facility] ENDO (2ND FLR);  Service: " "Endoscopy;  Laterality: N/A;    DIRECT LARYNGOSCOPY N/A 5/4/2022    Procedure: LARYNGOSCOPY, DIRECT possible biopsy;  Surgeon: Etta Higgins MD;  Location: [facility] OR;  Service: ENT;  Laterality: N/A;  RN PREOP ON 4/29/22. NEEDS T&S DAY OF SURGERY--NF  CONSENTS DAY OF SURGERY    DISSECTION OF NECK Right 5/4/2022    Procedure: RADICAL NECK DISSECTION;  Surgeon: Etta Higgins MD;  Location: [facility] OR;  Service: ENT;  Laterality: Right;  TO FOLLOW DR ALMAZAN    ESOPHAGOGASTRODUODENOSCOPY N/A 7/23/2020    Procedure: EGD (ESOPHAGOGASTRODUODENOSCOPY);  Surgeon: Nico Lackey MD;  Location: [facility] ([address] [place] [address]);  Service: Endoscopy;  Laterality: N/A;  per Dr Lackey-Will proceed with EGD and colonoscopy on the 2nd floor due to his comorbidities including obesity, sleep apnea, restrictive lung disease, coronary artery disease.  has loop recorder      ok to hold Eliquis 2 days per Dr Sandhu-must remain    EXTERNAL FIXATION TIBIAL FRACTURE Left 07/01/2016    INSERTION OF IMPLANTABLE LOOP RECORDER  04/07/2017    LEFT HEART CATHETERIZATION Left 1/30/2020    Procedure: Left heart cath;  Surgeon: Benjamin Sandhu MD;  Location: [facility] CATH LAB;  Service: Cardiology;  Laterality: Left;  RN PREOP 1/28/20--Pt starting Plavix loading dose today (8pills)- Dr. Sandhu aware.  Pt has a bandaged "non healing area to LLE"--Dr. Sandhu aware.    LEFT HEART CATHETERIZATION Left 2/14/2020    Procedure: Left heart cath, IVUS guided left main / LAD PCI. Noon start, radial approach;  Surgeon: Miguel Angel Brady MD;  Location: [facility] CATH LAB;  Service: Cardiology;  Laterality: Left;  RN PRE OP 2-7-2020  BMI--45.61    ORIF TIBIA FRACTURE Left 07/15/2016    PROBING OF NASOLACRIMAL DUCT WITH INSERTION OF TUBE Left 11/30/2020    Procedure: PROBING, NASOLACRIMAL DUCT,;  Surgeon: Teresa Cooper MD;  Location: [facility] 1ST FLR;  Service: Ophthalmology;  Laterality: Left;    RADIOACTIVE SEED IMPLANTATION OF PROSTATE N/A 8/8/2018    Procedure: " "INSERTION, RADIOACTIVE SEED, PROSTATE;  Surgeon: Bipin Thompson MD;  Location: [address];  Service: Urology;  Laterality: N/A;  1 hour    SKIN BIOPSY      Squamous cell cancer removal x3 with Mohs surgery      TONSILLECTOMY      TOTAL KNEE ARTHROPLASTY  10/2012    TRIGGER FINGER RELEASE Right 10/14/2020    Procedure: RELEASE, TRIGGER FINGER - right;  Surgeon: Rad Swift MD;  Location: [facility] OR;  Service: Orthopedics;  Laterality: Right;    trus/bx      ULTRASOUND GUIDANCE  2/14/2020    Procedure: Ultrasound Guidance;  Surgeon: Miguel Angel Brady MD;  Location: [facility] CATH LAB;  Service: Cardiology;;       Social History: Patient lives with family, and requires A for ADL's 2/2 hx of chemo and radiation for lymphoma. Pt states pain when swallowing, so he has not been able to eat much in the past few months, since the start of chemo.     Prior Intubation HX:  none    Modified Barium Swallow: none    Chest X-Rays:   8/10/22    Impression:     No acute cardiopulmonary process identified.     Prior diet: "Softer" food items 2/2 painful swallow due to radiation.    Subjective     Pt was asleep- lying in bed upon ST entrance, however, easily arouse to ST's greeting. Pt with dtr and sister present at b/s. Pt was agreeable to eval. Pt stating that since beginning of chemo/radiation a few months prior, he has experiences pain with swallowing, therefore, his diet has consisted of mostly eggs ans grits. Pt requesting to cont diet of cheese eggs, grits, and is open to chicken noodle soup as well.     Patient goals: Increase po intake     Pain/Comfort:  · Pain Rating 1: 0/10    Respiratory Status: Nasal cannula, flow 5 L/min    Objective:     Oral Musculature Evaluation  · Oral Musculature: WFL  · Dentition: present and adequate  · Secretion Management: adequate  · Mucosal Quality: adequate (Pt expressing dry oral cavity since start of radiation)  · Oral Labial Strength and Mobility: WFL  · Lingual Strength and " Mobility: WFL  · Voice Prior to PO Intake: Clear and audible    Bedside Swallow Eval:   Consistencies Assessed:  · Thin liquids -x5 single straw sips of water  · Puree -x6 tsp bites of pudding     Oral Phase:   · WFL    Pharyngeal Phase:   · no overt clinical signs/symptoms of aspiration  · no overt clinical signs/symptoms of pharyngeal dysphagia    Compensatory Strategies  · None    Treatment: It should be noted that silent aspiration cannot be r/o via b/s assessment. ST educated the pt regarding recs to cont current diet, and that ST will list all preferred items in diet order. Pt was agreeable to softer items.    ST notified pt's nurse regarding diet recs.     Assessment:     Janusz Montgomery Jr. is a 77 y.o. male with a dx of Sepsis who presents with painful swallows with solid foods 2/2 hx of radiation. ST recs cont current diet, with pt's preferred items. ST will f/u x1.    Goals:   Multidisciplinary Problems     SLP Goals        Problem: SLP    Goal Priority Disciplines Outcome   SLP Goal    Low SLP Ongoing, Progressing   Description: Goals:  1. Pt will tolerate regular diet with thin liquids w/o overt s/s of aspiration across x2 consecutive sessions (1 of 2 met 8/11).                   Plan:     · Patient to be seen:  2 x/week   · Plan of Care expires:     · Plan of Care reviewed with:  patient, daughter, sibling   · SLP Follow-Up:  Yes       Discharge recommendations:      Barriers to Discharge:  None    Time Tracking:     SLP Treatment Date:   08/11/22  Speech Start Time:  1345  Speech Stop Time:  1401     Speech Total Time (min):  16 min    Billable Minutes: Eval Swallow and Oral Function 16    08/11/2022

## 2022-08-11 NOTE — ED PROVIDER NOTES
Encounter Date: 8/10/2022       History     Chief Complaint   Patient presents with    Fatigue     Patient presents to ED via [facility] secondary to generalized weakness s/p chemo and radiation treatments for lymphoma. States was having a bowel movement earlier and had to lay on floor afterwards because of weakness. Denies fall or injury. Also reports frequent dehydration. C/o back pain, nausea and dizziness (orthostatic). Hx of lymphoma. Wears 5l O2 NC at all times.      77-year-old male presents to the ER via EMS for evaluation of generalized malaise and weakness.  The patient has head and neck cancer.  He is currently on chemotherapy.  His last dose of chemotherapy was last week on Monday approximately 10 days ago.  He states over the course of last week his weakness has gotten progressively worse.  He has had 2 falls secondary to the weakness but without injury.  He is chronically on oxygen at home.  Upon arrival to the ER slightly tachycardic and complaining of nausea.  On examination he does not appear to be toxic or septic.  He is afebrile.  He has a port to the chest.  He reports persistent emesis.  Denies any abdominal pain.  No changes in his bowels or bladder.  No fever or chills.  Awake alert and oriented, GCS 15.        Review of patient's allergies indicates:   Allergen Reactions    Ciprofloxacin Rash     Diffuse pruritic morbilliform rash developed 3/15/2017 after dose of cipro; previously in 2/2017 he had rash/fevers after initiation of cipro    Zosyn [piperacillin-tazobactam] Rash     Diffuse pruritic morbilliform rash developed 3/15/2017.  Then, 430am dose on 3/16 and rash worsened with SOB/tachypnea but no hypoxemia.     Bacitracin Itching and Rash     Violaceous rash in area of topical Tx.      Past Medical History:   Diagnosis Date    (HFpEF) heart failure with preserved ejection fraction 02/04/2022    NAT (acute kidney injury) 03/19/2017    ALLERGIC RHINITIS     Anemia     Anxiety      "Basal cell carcinoma 10/19/2018    forehead and right medial shoulder    Basal cell carcinoma 01/09/2019    left nasal bridge and left posterior ear    Basal cell carcinoma 06/12/2020    left lower medial leg - Efudex    Basal cell carcinoma 11/30/2020    left medial upper eyelid canthus superficial BCC    Chronic rhinitis 05/03/2013    Chronic rhinitis 05/03/2013    Coronary artery disease involving native coronary artery of native heart without angina pectoris s/p RCA stent     Cortical cataract of both eyes 03/18/2016    Delayed sleep phase syndrome 03/13/2019    Depression     Erectile dysfunction 03/24/2014    Erectile dysfunction 03/24/2014    Essential hypertension     GERD (gastroesophageal reflux disease) 07/25/2012    Gout, arthritis     Grade III open fracture of left tibia and fibula s/p ex-fix on 7/1/16 and ORIF of left tibia on 7/15 07/06/2016    H/O: iritis     Helicobacter pylori (H. pylori) infection     Treated    Hepatitis     Herpes simplex keratoconjunctivitis 09/30/2015    - on acyclovir - followed by opthalmology, Dr. Uribe     Herpes simplex keratoconjunctivitis 09/30/2015    - on acyclovir - followed by opthalmology, Dr. Uribe     Hyperkalemia 02/28/2017    Hyperlipidemia     Hypogonadism male     Hypogonadism male     Immunodeficiency due to drug therapy 7/8/2022    Keloid cicatrix     Mixed anxiety and depressive disorder     Morbid obesity     Obstructive sleep apnea on CPAP     CPAP    Osteoarthritis of left knee 07/25/2012    Paroxysmal atrial fibrillation 07/06/2016    Primary osteoarthritis of left knee 07/25/2012    Prominent aorta 01/25/2016    "RESULTS: THE HEART IS MILDLY ENLARGED WITH A SLIGHTLY PROMINENT AORTA" - Xray Chest PA & Lateral 12-     Prostate cancer 02/15/2016    - followed by urology, Dr. Young     Prostate cancer 02/15/2016    - followed by urology, Dr. Young     PVD (peripheral vascular disease)     Refractive " error 03/18/2016    Respiratory failure, unspecified with hypoxia 02/04/2022    SCC (squamous cell carcinoma) 06/29/2021    R trap    SCC (squamous cell carcinoma) 01/10/2022    left lateral ankle    Skin disease     Skin ulcer     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    chest and forehead    Squamous cell cancer of skin of nose     Traumatic type III open fracture of shaft of left tibia and fibula with nonunion 07/06/2016    Type III open fracture of left tibia and fibula with routine healing 07/06/2016    Vitamin D deficiency disease     Vitreous detachment 03/18/2016     Past Surgical History:   Procedure Laterality Date    ADJACENT TISSUE TRANSFER Left 11/30/2020    Procedure: ADJACENT TISSUE TRANSFER x2 ;  Surgeon: Teresa Cooper MD;  Location: [facility] 1ST FLR;  Service: Ophthalmology;  Laterality: Left;  Left glabellar 8.5x11x3 and Left upper eyelid advancement flap 40x69y3      Cardiac stenting x2      CATARACT EXTRACTION W/  INTRAOCULAR LENS IMPLANT Right 3/29/2016    Dr. Conteh    CATARACT EXTRACTION W/  INTRAOCULAR LENS IMPLANT Left 4/12/2016        COLONOSCOPY N/A 7/23/2020    Procedure: COLONOSCOPY;  Surgeon: Nico Lackey MD;  Location: [facility] (2ND FLR);  Service: Endoscopy;  Laterality: N/A;    DIRECT LARYNGOSCOPY N/A 5/4/2022    Procedure: LARYNGOSCOPY, DIRECT possible biopsy;  Surgeon: Etta Higgins MD;  Location: [facility] OR;  Service: ENT;  Laterality: N/A;  RN PREOP ON 4/29/22. NEEDS T&S DAY OF SURGERY--NF  CONSENTS DAY OF SURGERY    DISSECTION OF NECK Right 5/4/2022    Procedure: RADICAL NECK DISSECTION;  Surgeon: Etta Higgins MD;  Location: [facility] OR;  Service: ENT;  Laterality: Right;  TO FOLLOW DR ALMAZAN    ESOPHAGOGASTRODUODENOSCOPY N/A 7/23/2020    Procedure: EGD (ESOPHAGOGASTRODUODENOSCOPY);  Surgeon: Nico Lackey MD;  Location: [facility] ENDO (2ND FLR);  Service: Endoscopy;  Laterality: N/A;  per Dr Lackey-Will proceed with EGD and  "colonoscopy on the 2nd floor due to his comorbidities including obesity, sleep apnea, restrictive lung disease, coronary artery disease.  has loop recorder      ok to hold Eliquis 2 days per Dr Sandhu-must remain    EXTERNAL FIXATION TIBIAL FRACTURE Left 07/01/2016    INSERTION OF IMPLANTABLE LOOP RECORDER  04/07/2017    LEFT HEART CATHETERIZATION Left 1/30/2020    Procedure: Left heart cath;  Surgeon: Benjamin Sandhu MD;  Location: [facility] CATH LAB;  Service: Cardiology;  Laterality: Left;  RN PREOP 1/28/20--Pt starting Plavix loading dose today (8pills)- Dr. Sandhu aware.  Pt has a bandaged "non healing area to LLE"--Dr. Sandhu aware.    LEFT HEART CATHETERIZATION Left 2/14/2020    Procedure: Left heart cath, IVUS guided left main / LAD PCI. Noon start, radial approach;  Surgeon: Miguel Angel Brady MD;  Location: [facility] CATH LAB;  Service: Cardiology;  Laterality: Left;  RN PRE OP 2-7-2020  BMI--45.61    ORIF TIBIA FRACTURE Left 07/15/2016    PROBING OF NASOLACRIMAL DUCT WITH INSERTION OF TUBE Left 11/30/2020    Procedure: PROBING, NASOLACRIMAL DUCT,;  Surgeon: Teresa Cooper MD;  Location: [facility] OR [address] [place] [address];  Service: Ophthalmology;  Laterality: Left;    RADIOACTIVE SEED IMPLANTATION OF PROSTATE N/A 8/8/2018    Procedure: INSERTION, RADIOACTIVE SEED, PROSTATE;  Surgeon: Bipin Thompson MD;  Location: [facility] OR [address] [place] [address];  Service: Urology;  Laterality: N/A;  1 hour    SKIN BIOPSY      Squamous cell cancer removal x3 with Mohs surgery      TONSILLECTOMY      TOTAL KNEE ARTHROPLASTY  10/2012    TRIGGER FINGER RELEASE Right 10/14/2020    Procedure: RELEASE, TRIGGER FINGER - right;  Surgeon: Rad Swift MD;  Location: [facility] OR;  Service: Orthopedics;  Laterality: Right;    trus/bx      ULTRASOUND GUIDANCE  2/14/2020    Procedure: Ultrasound Guidance;  Surgeon: Miguel Angel Brady MD;  Location: [facility] CATH LAB;  Service: Cardiology;;     Family History   Problem Relation Age of Onset    Skin cancer Father     Lung " cancer Father     Cancer Father         smoker,     Alzheimer's disease Mother     Hypertension Mother     Cancer Sister         colon, lung cancer     No Known Problems Sister     Cancer Brother         skin cancer, polypectomy     Peripheral vascular disease Unknown     No Known Problems Maternal Aunt     No Known Problems Maternal Uncle     No Known Problems Paternal Aunt     No Known Problems Paternal Uncle     No Known Problems Maternal Grandmother     No Known Problems Maternal Grandfather     No Known Problems Paternal Grandmother     No Known Problems Paternal Grandfather     Melanoma Neg Hx     Psoriasis Neg Hx     Lupus Neg Hx     Eczema Neg Hx     Amblyopia Neg Hx     Blindness Neg Hx     Cataracts Neg Hx     Diabetes Neg Hx     Glaucoma Neg Hx     Macular degeneration Neg Hx     Retinal detachment Neg Hx     Strabismus Neg Hx     Stroke Neg Hx     Thyroid disease Neg Hx     Acne Neg Hx      Social History     Tobacco Use    Smoking status: Never Smoker    Smokeless tobacco: Never Used   Substance Use Topics    Alcohol use: Yes     Alcohol/week: 2.0 standard drinks     Types: 2 Cans of beer per week     Comment: occasionally, beer    Drug use: Never     Review of Systems   Constitutional: Positive for fatigue. Negative for fever.        Weakness   HENT: Negative for sore throat.    Respiratory: Negative for shortness of breath.    Cardiovascular: Negative for chest pain.   Gastrointestinal: Positive for nausea.   Genitourinary: Negative for dysuria.   Musculoskeletal: Negative for back pain.   Skin: Negative for rash.   Neurological: Negative for weakness.   Hematological: Does not bruise/bleed easily.       Physical Exam     Initial Vitals [08/10/22 2201]   BP Pulse Resp Temp SpO2   (!) 112/56 (!) 115 20 97.6 °F (36.4 °C) 100 %      MAP       --         Physical Exam    Constitutional: Vital signs are normal. He appears well-developed and well-nourished.   HENT:   Head:  Normocephalic and atraumatic.   Right Ear: Hearing normal.   Left Ear: Hearing normal.   No acute significant findings   Eyes: Conjunctivae are normal.   Equally round and reactive to light   Cardiovascular: Regular rhythm.   Tachycardic on arrival   Pulmonary/Chest:   Clear on exam  Port present,   Large wounds noted to the upper chest around the neck secondary to his malignancy unchanged from their typical appearance   Abdominal: Abdomen is soft. Bowel sounds are normal.   Soft and without tenderness   Musculoskeletal:         General: Normal range of motion.      Comments: Moving everything well, no pain to the extremities     Neurological: He is alert and oriented to person, place, and time.   Normal neurological exam   Skin: Skin is warm and intact.   No obvious rashes or lesions identified   Psychiatric: He has a normal mood and affect. His speech is normal and behavior is normal. Cognition and memory are normal.         ED Course   Critical Care    Date/Time: 8/11/2022 3:19 AM  Performed by: Osiel Barajas PA-C  Authorized by: Walter Hansen MD   Direct patient critical care time: 20 minutes  Additional history critical care time: 5 minutes  Ordering / reviewing critical care time: 5 minutes  Documentation critical care time: 5 minutes  Consulting other physicians critical care time: 5 minutes  Consult with family critical care time: 5 minutes  Total critical care time (exclusive of procedural time) : 45 minutes  Critical care was necessary to treat or prevent imminent or life-threatening deterioration of the following conditions: elevated lactate.  Critical care was time spent personally by me on the following activities: review of old charts and re-evaluation of patient's condition.        Labs Reviewed   CBC W/ AUTO DIFFERENTIAL - Abnormal; Notable for the following components:       Result Value    RBC 2.54 (*)     Hemoglobin 9.2 (*)     Hematocrit 26.4 (*)      (*)     MCH 36.2 (*)      RDW 17.1 (*)     Platelets 94 (*)     Lymph # 0.2 (*)     Mono # 0.1 (*)     Gran % 92.6 (*)     Lymph % 4.9 (*)     Mono % 1.9 (*)     All other components within normal limits   COMPREHENSIVE METABOLIC PANEL - Abnormal; Notable for the following components:    CO2 30 (*)     Glucose 131 (*)     Creatinine 1.7 (*)     Albumin 3.1 (*)     Total Bilirubin 2.3 (*)     Alkaline Phosphatase 150 (*)     eGFR 42 (*)     All other components within normal limits   LACTIC ACID, PLASMA - Abnormal; Notable for the following components:    Lactate (Lactic Acid) 4.2 (*)     All other components within normal limits    Narrative:     Lactic Acid critical result(s) called and verbal readback obtained   from Cyndy BEDOYA RN ED  by TORI 08/10/2022 23:11   URINALYSIS, REFLEX TO URINE CULTURE - Abnormal; Notable for the following components:    Appearance, UA Hazy (*)     Protein, UA 1+ (*)     Glucose, UA 1+ (*)     Occult Blood UA Trace (*)     Urobilinogen, UA 2.0-3.0 (*)     Leukocytes, UA 2+ (*)     All other components within normal limits    Narrative:     Specimen Source->Urine   MAGNESIUM - Abnormal; Notable for the following components:    Magnesium 1.5 (*)     All other components within normal limits   TROPONIN I - Abnormal; Notable for the following components:    Troponin I 0.028 (*)     All other components within normal limits   LACTIC ACID, PLASMA - Abnormal; Notable for the following components:    Lactate (Lactic Acid) 2.8 (*)     All other components within normal limits    Narrative:     Complete after fluids   URINALYSIS MICROSCOPIC - Abnormal; Notable for the following components:    RBC, UA 6 (*)     WBC, UA 38 (*)     Hyaline Casts, UA 27 (*)     WBC Casts, UA 20 (*)     Granular Casts, UA 13 (*)     All other components within normal limits    Narrative:     Specimen Source->Urine   CULTURE, BLOOD   CULTURE, BLOOD   CULTURE, URINE   POCT INFLUENZA A/B MOLECULAR   SARS-COV-2 RDRP GENE          Imaging Results           X-Ray Chest AP Portable (Final result)  Result time 08/10/22 23:48:01    Final result by Boom Wilson MD (08/10/22 23:48:01)                 Impression:      No acute cardiopulmonary process identified.      Electronically signed by: Boom Wilson MD  Date:    08/10/2022  Time:    23:48             Narrative:    EXAMINATION:  XR CHEST AP PORTABLE    CLINICAL HISTORY:  Sepsis;    TECHNIQUE:  Single frontal view of the chest was performed.    COMPARISON:  07/29/2022.    FINDINGS:  Cardiac silhouette is normal in size.  Right-sided chest port is visualized in stable position.  Loop recorder device is noted.  Lungs are hypoinflated.  Calcified pleural plaques are seen.  No evidence of new focal consolidative process, pneumothorax, or significant pleural effusion.  No acute osseous abnormality identified.                                 Medications   lactated ringers bolus 1,000 mL (0 mLs Intravenous Stopped 8/11/22 0102)   ondansetron injection 4 mg (4 mg Intravenous Given 8/10/22 2306)   lactated ringers bolus 1,000 mL (0 mLs Intravenous Stopped 8/11/22 0219)   vancomycin (VANCOCIN) 2,000 mg in dextrose 5 % 500 mL IVPB (0 mg Intravenous Stopped 8/11/22 0210)     Medical Decision Making:   History:   Old Medical Records: I decided to obtain old medical records.  Old Records Summarized: records from clinic visits.  Initial Assessment:   77-year-old male on chemotherapy presenting with weakness and nausea  Differential Diagnosis:   Dehydration, electrolyte derangement, chemo induced emesis  Independently Interpreted Test(s):   I have ordered and independently interpreted X-rays - see summary below.       <> Summary of X-Ray Reading(s): No pulmonary edema, no pneumonia  Clinical Tests:   Lab Tests: Ordered and Reviewed  Radiological Study: Ordered and Reviewed  Medical Tests: Ordered and Reviewed  ED Management:  Plan:   Septic workup including labs, blood cultures and chest x-ray.  Patient does not appear to be  "septic on exam but given his chemotherapy state we will evaluate.  I will treat in the ED with fluids and Zofran.  Initial lactate greater than 4.  Patient was given 2 L of LR and vancomycin.  He has a penicillin allergy, allergy reviewed.  Persistently tachycardic while in the ED, improved from the 130s to 114 after fluids.  Lactate improved to 2.8, remains slightly tachycardic.  Excessive emesis while in the ED controlled with Zofran.  No identifiable source of an infectious process, vancomycin given out of an abundance of caution secondary to his immune compromised state with tachycardia and elevated lactate.  On reassessment at 3:00 a.m., patient resting comfortably.  I suspect his lactate may elevated secondary to dehydration and emesis.  I have discussed the case with case management and with Hospital Medicine.  The patient will be admitted.              Sepsis Perfusion Assessment: "I attest a sepsis perfusion exam was performed within 6 hours of sepsis, severe sepsis, or septic shock presentation, following fluid resuscitation."        Clinical Impression:   Final diagnoses:  [R53.1] Weakness          ED Disposition Condition    Admit               Osiel Barajas PA-C  08/11/22 6519    "

## 2022-08-11 NOTE — CARE UPDATE
Patient seen and examined.  Please see H/P, treatment and plan per Dr. Watson.  Patient presents with weakness and shortness of breath.  Admitted with apparent sepsis with tachycardia and elevated lactic acid.  Patient afebrile with normal WBC.  Abnormal UA, but patient with no urinary symptoms.    Elevated lactic acid possibly related to volume depletion.  Volume depletion probably secondary to low oral intake from dysphagia.  Patient's symptoms may be related to head and neck cancer/treatment complications.  Will consult Heme/Onc and ENT.  IVF hydration.  ST evaluation.  Repeat labs in Am.

## 2022-08-11 NOTE — PROGRESS NOTES
Pharmacokinetic Initial Assessment: IV Vancomycin    Assessment/Plan:    Initiate intravenous vancomycin with loading dose of 2000 mg once followed by a maintenance dose of vancomycin 2000 mg IV every 24 hours  Desired empiric serum trough concentration is 10 to 20 mcg/mL  Draw vancomycin trough level 60 min prior to third dose on 8/12 at approximately 2230  Pharmacy will continue to follow and monitor vancomycin.      Please contact pharmacy at extension [phone] with any questions regarding this assessment.     Thank you for the consult,   Abdoulaye Light       Patient brief summary:  Janusz Montgomery Jr. is a 77 y.o. male initiated on antimicrobial therapy with IV Vancomycin for treatment of suspected sepsis    Drug Allergies:   Review of patient's allergies indicates:   Allergen Reactions    Ciprofloxacin Rash     Diffuse pruritic morbilliform rash developed 3/15/2017 after dose of cipro; previously in 2/2017 he had rash/fevers after initiation of cipro    Zosyn [piperacillin-tazobactam] Rash     Diffuse pruritic morbilliform rash developed 3/15/2017.  Then, 430am dose on 3/16 and rash worsened with SOB/tachypnea but no hypoxemia.     Bacitracin Itching and Rash     Violaceous rash in area of topical Tx.        Actual Body Weight:   141 kg    Renal Function:   Estimated Creatinine Clearance: 55.5 mL/min (A) (based on SCr of 1.7 mg/dL (H)).,     Dialysis Method (if applicable):  N/A    CBC (last 72 hours):  Recent Labs   Lab Result Units 08/10/22  2244   WBC K/uL 4.27   Hemoglobin g/dL 9.2*   Hematocrit % 26.4*   Platelets K/uL 94*   Gran % % 92.6*   Lymph % % 4.9*   Mono % % 1.9*   Eosinophil % % 0.2   Basophil % % 0.2   Differential Method  Automated       Metabolic Panel (last 72 hours):  Recent Labs   Lab Result Units 08/10/22  2244 08/11/22  0018   Sodium mmol/L 143  --    Potassium mmol/L 4.0  --    Chloride mmol/L 102  --    CO2 mmol/L 30*  --    Glucose mg/dL 131*  --    Glucose, UA   --  1+*   BUN mg/dL 14   --    Creatinine mg/dL 1.7*  --    Albumin g/dL 3.1*  --    Total Bilirubin mg/dL 2.3*  --    Alkaline Phosphatase U/L 150*  --    AST U/L 15  --    ALT U/L 19  --    Magnesium mg/dL 1.5*  --        Drug levels (last 3 results):  No results for input(s): VANCOMYCINRA, VANCORANDOM, VANCOMYCINPE, VANCOPEAK, VANCOMYCINTR, VANCOTROUGH in the last 72 hours.    Microbiologic Results:  Microbiology Results (last 7 days)       Procedure Component Value Units Date/Time    Clostridium difficile EIA [[id number]]     Order Status: No result Specimen: Stool     Urine culture [[id number]] Collected: 08/11/22 0018    Order Status: No result Specimen: Urine Updated: 08/11/22 0047    Blood culture x two cultures. Draw prior to antibiotics. [[id number]] Collected: 08/10/22 2235    Order Status: Sent Specimen: Blood from Peripheral, Antecubital, Right Updated: 08/10/22 2252    Blood culture x two cultures. Draw prior to antibiotics. [[id number]] Collected: 08/10/22 2242    Order Status: Sent Specimen: Blood from Peripheral, Antecubital, Left Updated: 08/10/22 2252

## 2022-08-11 NOTE — PLAN OF CARE
"  Problem: SLP  Goal: SLP Goal  Description: Goals:  1. Pt will tolerate regular diet with thin liquids w/o overt s/s of aspiration across x2 consecutive sessions (1 of 2 met 8/11).  Outcome: Ongoing, Progressing    Pt is safe to cont regular diet with thin liquids, however, pt is requesting certain "softer" items which were listed in diet order. ST will f/u x1.        "

## 2022-08-11 NOTE — ASSESSMENT & PLAN NOTE
Patient with Paroxysmal (<7 days) atrial fibrillation which is controlled currently with Amiodarone. Patient is currently in sinus rhythm.[id number] Score: 3. HASBLED Score: Unable to calculate. Anticoagulation indicated. Anticoagulation done with Eliquis.

## 2022-08-11 NOTE — SUBJECTIVE & OBJECTIVE
VIRTUAL TELENOTE    Start time:4:15pm  Chief complaint: Weakness  The patient location is [address]   The patient arrived at: 4:21pm  Present with the patient at the time of the telemed/virtual assessment:wife    End time:  4:35pm     Total time spent with patient: 20 min    The attending portion of this evaluation, treatment, and documentation was performed per Crystal Arias MD via Telemedicine AudioVisual using the secure [employer] software platform with 2 way audio/video. The provider was located off-site and the patient is located in the hospital. The aforementioned video software was utilized to document the relevant history and physical exam.     Oncology Treatment Plan:   OP CARBOPLATIN WEEKLY    Medications:  Continuous Infusions:   sodium chloride 0.9% 125 mL/hr at 08/11/22 1421     Scheduled Meds:   amiodarone  200 mg Oral Daily    apixaban  5 mg Oral BID    atorvastatin  40 mg Oral QHS    clopidogreL  75 mg Oral Daily    gabapentin  100 mg Oral BID    melatonin  9 mg Oral Nightly    metoprolol succinate  25 mg Oral Daily    vancomycin (VANCOCIN) IVPB  15 mg/kg Intravenous Q24H     PRN Meds:acetaminophen, albuterol-ipratropium, dextrose 10%, dextrose 10%, glucagon (human recombinant), glucose, glucose, hydrocodone-apap 7.5-325 MG/15 ML, morphine, ondansetron, sodium chloride 0.9%, Pharmacy to dose Vancomycin consult **AND** vancomycin - pharmacy to dose     Review of patient's allergies indicates:   Allergen Reactions    Ciprofloxacin Rash     Diffuse pruritic morbilliform rash developed 3/15/2017 after dose of cipro; previously in 2/2017 he had rash/fevers after initiation of cipro    Zosyn [piperacillin-tazobactam] Rash     Diffuse pruritic morbilliform rash developed 3/15/2017.  Then, 430am dose on 3/16 and rash worsened with SOB/tachypnea but no hypoxemia.     Bacitracin Itching and Rash     Violaceous rash in area of topical Tx.         Past Medical History:   Diagnosis Date    (HFpEF) heart failure  "with preserved ejection fraction 02/04/2022    NAT (acute kidney injury) 03/19/2017    ALLERGIC RHINITIS     Anemia     Anxiety     Basal cell carcinoma 10/19/2018    forehead and right medial shoulder    Basal cell carcinoma 01/09/2019    left nasal bridge and left posterior ear    Basal cell carcinoma 06/12/2020    left lower medial leg - Efudex    Basal cell carcinoma 11/30/2020    left medial upper eyelid canthus superficial BCC    Chronic rhinitis 05/03/2013    Chronic rhinitis 05/03/2013    Coronary artery disease involving native coronary artery of native heart without angina pectoris s/p RCA stent     Cortical cataract of both eyes 03/18/2016    Delayed sleep phase syndrome 03/13/2019    Depression     Erectile dysfunction 03/24/2014    Erectile dysfunction 03/24/2014    Essential hypertension     GERD (gastroesophageal reflux disease) 07/25/2012    Gout, arthritis     Grade III open fracture of left tibia and fibula s/p ex-fix on 7/1/16 and ORIF of left tibia on 7/15 07/06/2016    H/O: iritis     Helicobacter pylori (H. pylori) infection     Treated    Hepatitis     Herpes simplex keratoconjunctivitis 09/30/2015    - on acyclovir - followed by opthalmology, Dr. Uribe     Herpes simplex keratoconjunctivitis 09/30/2015    - on acyclovir - followed by opthalmology, Dr. Uribe     Hyperkalemia 02/28/2017    Hyperlipidemia     Hypogonadism male     Hypogonadism male     Immunodeficiency due to drug therapy 07/08/2022    Keloid cicatrix     Lymphoma     HEAD & NECK    Mixed anxiety and depressive disorder     Morbid obesity     Obstructive sleep apnea on CPAP     CPAP    On home oxygen therapy     Osteoarthritis of left knee 07/25/2012    Paroxysmal atrial fibrillation 07/06/2016    Primary osteoarthritis of left knee 07/25/2012    Prominent aorta 01/25/2016    "RESULTS: THE HEART IS MILDLY ENLARGED WITH A SLIGHTLY PROMINENT AORTA" - Xray Chest PA & Lateral 12-     Prostate cancer 02/15/2016    - followed " by urology, Dr. Young     Prostate cancer 02/15/2016    - followed by urology, Dr. 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    chest and forehead    Squamous cell cancer of skin of nose     Traumatic type III open fracture of shaft of left tibia and fibula with nonunion 07/06/2016    Type III open fracture of left tibia and fibula with routine healing 07/06/2016    Vitamin D deficiency disease     Vitreous detachment 03/18/2016     Past Surgical History:   Procedure Laterality Date    ADJACENT TISSUE TRANSFER Left 11/30/2020    Procedure: ADJACENT TISSUE TRANSFER x2 ;  Surgeon: Teresa Cooper MD;  Location: [address];  Service: Ophthalmology;  Laterality: Left;  Left glabellar 8.5x11x3 and Left upper eyelid advancement flap [id number]      Cardiac stenting x2      CATARACT EXTRACTION W/  INTRAOCULAR LENS IMPLANT Right 3/29/2016    Dr. Conteh    CATARACT EXTRACTION W/  INTRAOCULAR LENS IMPLANT Left 4/12/2016        COLONOSCOPY N/A 7/23/2020    Procedure: COLONOSCOPY;  Surgeon: Nico Laceky MD;  Location: [facility] (2ND FLR);  Service: Endoscopy;  Laterality: N/A;    DIRECT LARYNGOSCOPY N/A 5/4/2022    Procedure: LARYNGOSCOPY, DIRECT possible biopsy;  Surgeon: Etta Higgins MD;  Location: [facility] OR;  Service: ENT;  Laterality: N/A;  RN PREOP ON 4/29/22. NEEDS T&S DAY OF SURGERY--NF  CONSENTS DAY OF SURGERY    DISSECTION OF NECK Right 5/4/2022    Procedure: RADICAL NECK DISSECTION;  Surgeon: Etta Higgins MD;  Location: [facility] OR;  Service: ENT;  Laterality: Right;  TO FOLLOW DR ALMAZAN    ESOPHAGOGASTRODUODENOSCOPY N/A 7/23/2020    Procedure: EGD (ESOPHAGOGASTRODUODENOSCOPY);  Surgeon: Nico  "ISSA Lackey MD;  Location: [facility] (2ND FLR);  Service: Endoscopy;  Laterality: N/A;  per Dr Lackey-Will proceed with EGD and colonoscopy on the 2nd floor due to his comorbidities including obesity, sleep apnea, restrictive lung disease, coronary artery disease.  has loop recorder      ok to hold Eliquis 2 days per Dr Sandhu-must remain    EXTERNAL FIXATION TIBIAL FRACTURE Left 07/01/2016    INSERTION OF IMPLANTABLE LOOP RECORDER  04/07/2017    LEFT HEART CATHETERIZATION Left 1/30/2020    Procedure: Left heart cath;  Surgeon: Benjamin Sandhu MD;  Location: [facility] CATH LAB;  Service: Cardiology;  Laterality: Left;  RN PREOP 1/28/20--Pt starting Plavix loading dose today (8pills)- Dr. Sandhu aware.  Pt has a bandaged "non healing area to LLE"--Dr. Sandhu aware.    LEFT HEART CATHETERIZATION Left 2/14/2020    Procedure: Left heart cath, IVUS guided left main / LAD PCI. Noon start, radial approach;  Surgeon: Miguel Angel Brady MD;  Location: [facility] CATH LAB;  Service: Cardiology;  Laterality: Left;  RN PRE OP 2-7-2020  BMI--45.61    ORIF TIBIA FRACTURE Left 07/15/2016    PROBING OF NASOLACRIMAL DUCT WITH INSERTION OF TUBE Left 11/30/2020    Procedure: PROBING, NASOLACRIMAL DUCT,;  Surgeon: Teresa Cooper MD;  Location: [address];  Service: Ophthalmology;  Laterality: Left;    RADIOACTIVE SEED IMPLANTATION OF PROSTATE N/A 8/8/2018    Procedure: INSERTION, RADIOACTIVE SEED, PROSTATE;  Surgeon: Bipin Thompson MD;  [address];  Service: Urology;  Laterality: N/A;  1 hour    SKIN BIOPSY      Squamous cell cancer removal x3 with Mohs surgery      TONSILLECTOMY      TOTAL KNEE ARTHROPLASTY  10/2012    TRIGGER FINGER RELEASE Right 10/14/2020    Procedure: RELEASE, TRIGGER FINGER - right;  Surgeon: Rad Swift MD;  Location: [facility];  Service: Orthopedics;  Laterality: Right;    trus/bx      ULTRASOUND GUIDANCE  2/14/2020    Procedure: Ultrasound Guidance;  Surgeon: Miguel Angel Brady MD;  Location: [facility]" LAB;  Service: Cardiology;;     Family History       Problem Relation (Age of Onset)    Alzheimer's disease Mother    Cancer Father, Sister, Brother    Hypertension Mother    Lung cancer Father    No Known Problems Sister, Maternal Aunt, Maternal Uncle, Paternal Aunt, Paternal Uncle, Maternal Grandmother, Maternal Grandfather, Paternal Grandmother, Paternal Grandfather    Peripheral vascular disease     Skin cancer Father          Tobacco Use    Smoking status: Never Smoker    Smokeless tobacco: Never Used   Substance and Sexual Activity    Alcohol use: Yes     Alcohol/week: 2.0 standard drinks     Types: 2 Cans of beer per week     Comment: occasionally, beer    Drug use: Never    Sexual activity: Not on file       Review of Systems   Constitutional:  Positive for activity change, appetite change and fatigue.   HENT:  Positive for trouble swallowing. Negative for mouth sores.    Respiratory:  Positive for shortness of breath (mild bird, chronic ,stable). Negative for cough.    Cardiovascular:  Negative for chest pain.   Gastrointestinal:  Positive for diarrhea, nausea and vomiting. Negative for blood in stool.   Genitourinary:  Negative for hematuria.   Neurological:  Positive for weakness. Negative for syncope.   Objective:     Vital Signs (Most Recent):  Temp: 97.9 °F (36.6 °C) (08/11/22 1629)  Pulse: (!) 112 (08/11/22 1629)  Resp: (!) 24 (08/11/22 1629)  BP: (!) 117/55 (08/11/22 1629)  SpO2: 100 % (08/11/22 1629)   Vital Signs (24h Range):  Temp:  [97.6 °F (36.4 °C)-98.9 °F (37.2 °C)] 97.9 °F (36.6 °C)  Pulse:  [] 112  Resp:  [20-24] 24  SpO2:  [95 %-100 %] 100 %  BP: ()/(44-70) 117/55     Weight: (!) 141.5 kg (312 lb)  Body mass index is 41.16 kg/m².  Body surface area is 2.7 meters squared.      Intake/Output Summary (Last 24 hours) at 8/11/2022 1633  Last data filed at 8/11/2022 0757  Gross per 24 hour   Intake 2550 ml   Output --   Net 2550 ml       Physical Exam    Significant Labs:   All  pertinent labs within the past 24 hours have been reviewed     I have reviewed and interpreted all pertinent imaging results/findings within the past 24 hours

## 2022-08-11 NOTE — ASSESSMENT & PLAN NOTE
Somewhat atypical and possibly related to CKD  Patient has mild troponin elevation without any new EKG changes  Currently chest pain-free with advanced age.  Will currently pursue conservative treatment    Further medical treatment such as ACE, beta blockade, antiplatelet therapy per day inpatient team

## 2022-08-11 NOTE — H&P
"[facility] [place] [facility]  [facility]  History & Physical    Patient Name: Janusz Montgomery Jr.  MRN: [id number]  Patient Class: IP- Inpatient  Admission Date: 8/10/2022  Attending Physician: Srinivasa Miranda MD  Primary Care Provider: Miguelina Weathers MD         Patient information was obtained from patient, spouse/SO, past medical records and ER records.     Subjective:     Principal Problem:Sepsis    Chief Complaint:   Chief Complaint   Patient presents with    Fatigue     Patient presents to ED via [facility] secondary to generalized weakness s/p chemo and radiation treatments for lymphoma. States was having a bowel movement earlier and had to lay on floor afterwards because of weakness. Denies fall or injury. Also reports frequent dehydration. C/o back pain, nausea and dizziness (orthostatic). Hx of lymphoma. Wears 5l O2 NC at all times.        HPI: 77 y.o. male PMHx CHF EF 65%, CKD, BCC s/p chemo presents with moderate to severe generalized weakness and profuse diarrhea x 1 day. Patient states he could "barely get off the toilet." Symptoms associated with nausea and nonbloody emesis.  Patient has also noted some skin changes around his neck.  He sometimes has changes around his neck and chest after chemo. Last chemotherapy was Monday 1 week ago.  Wife states that he has been very difficult to care for secondary to his worsening weakness.  Rest of history limited by critical illness.    ED course: Presented tachycardic and afebrile. Lactic acid 4.2, WBC 4, Hg 9, Cr 1.7, Mg 1.5,  UA abnormal. Tn 0.028. EKG shows sinus tachycardia without STEMI. CXR NAD. Patient aas given 2 L of LR, and vancomycin. Blood cultures collected. Lactate improved to 2.8.  Tachycardia improved from the 130s to 115.        Admitted to Hospital Medicine for further evaluation    May 2022 Echo Summary    · The left ventricle is normal in size with concentric hypertrophy and normal systolic function.  · The estimated ejection fraction is " 65%.  · Normal left ventricular diastolic function.  · Normal right ventricular size with normal right ventricular systolic function.  · Mild left atrial enlargement.  · Mild-to-moderate aortic regurgitation.  · Mild tricuspid regurgitation.  · Normal central venous pressure (3 mmHg).  · The estimated PA systolic pressure is 16 mmHg.  · Very TDS    Past Medical History:   Diagnosis Date    (HFpEF) heart failure with preserved ejection fraction 02/04/2022    NAT (acute kidney injury) 03/19/2017    ALLERGIC RHINITIS     Anemia     Anxiety     Basal cell carcinoma 10/19/2018    forehead and right medial shoulder    Basal cell carcinoma 01/09/2019    left nasal bridge and left posterior ear    Basal cell carcinoma 06/12/2020    left lower medial leg - Efudex    Basal cell carcinoma 11/30/2020    left medial upper eyelid canthus superficial BCC    Chronic rhinitis 05/03/2013    Chronic rhinitis 05/03/2013    Coronary artery disease involving native coronary artery of native heart without angina pectoris s/p RCA stent     Cortical cataract of both eyes 03/18/2016    Delayed sleep phase syndrome 03/13/2019    Depression     Erectile dysfunction 03/24/2014    Erectile dysfunction 03/24/2014    Essential hypertension     GERD (gastroesophageal reflux disease) 07/25/2012    Gout, arthritis     Grade III open fracture of left tibia and fibula s/p ex-fix on 7/1/16 and ORIF of left tibia on 7/15 07/06/2016    H/O: iritis     Helicobacter pylori (H. pylori) infection     Treated    Hepatitis     Herpes simplex keratoconjunctivitis 09/30/2015    - on acyclovir - followed by opthalmologyDr. Uribe     Herpes simplex keratoconjunctivitis 09/30/2015    - on acyclovir - followed by opthalmology, Dr. Uribe     Hyperkalemia 02/28/2017    Hyperlipidemia     Hypogonadism male     Hypogonadism male     Immunodeficiency due to drug therapy 7/8/2022    Keloid cicatrix     Mixed anxiety and depressive  "disorder     Morbid obesity     Obstructive sleep apnea on CPAP     CPAP    Osteoarthritis of left knee 07/25/2012    Paroxysmal atrial fibrillation 07/06/2016    Primary osteoarthritis of left knee 07/25/2012    Prominent aorta 01/25/2016    "RESULTS: THE HEART IS MILDLY ENLARGED WITH A SLIGHTLY PROMINENT AORTA" - Xray Chest PA & Lateral 12-     Prostate cancer 02/15/2016    - followed by urology, Dr. Young     Prostate cancer 02/15/2016    - followed by urology, Dr. 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    chest and forehead    Squamous cell cancer of skin of nose     Traumatic type III open fracture of shaft of left tibia and fibula with nonunion 07/06/2016    Type III open fracture of left tibia and fibula with routine healing 07/06/2016    Vitamin D deficiency disease     Vitreous detachment 03/18/2016     Past Surgical History:   Procedure Laterality Date    ADJACENT TISSUE TRANSFER Left 11/30/2020    Procedure: ADJACENT TISSUE TRANSFER x2 ;  Surgeon: Teresa Cooper MD;  Location: [facility] OR [address] [place] [address];  Service: Ophthalmology;  Laterality: Left;  Left glabellar 8.5x11x3 and Left upper eyelid advancement flap [id number]      Cardiac stenting x2      CATARACT EXTRACTION W/  INTRAOCULAR LENS IMPLANT Right 3/29/2016    Dr. Conteh    CATARACT EXTRACTION W/  INTRAOCULAR LENS IMPLANT Left 4/12/2016        COLONOSCOPY N/A 7/23/2020    Procedure: COLONOSCOPY;  Surgeon: Nico Lackey MD;  Location: [facility] (2ND FLR);  Service: Endoscopy;  Laterality: N/A;    DIRECT LARYNGOSCOPY N/A 5/4/2022    Procedure: LARYNGOSCOPY, DIRECT possible biopsy;  Surgeon: Etta Higgins, " "MD;  Location: [facility] OR;  Service: ENT;  Laterality: N/A;  RN PREOP ON 4/29/22. NEEDS T&S DAY OF SURGERY--NF  CONSENTS DAY OF SURGERY    DISSECTION OF NECK Right 5/4/2022    Procedure: RADICAL NECK DISSECTION;  Surgeon: Etta Higgins MD;  Location: [facility] OR;  Service: ENT;  Laterality: Right;  TO FOLLOW DR ALMAZAN    ESOPHAGOGASTRODUODENOSCOPY N/A 7/23/2020    Procedure: EGD (ESOPHAGOGASTRODUODENOSCOPY);  Surgeon: Nico Lackey MD;  Location: [facility] (2ND FLR);  Service: Endoscopy;  Laterality: N/A;  per Dr Lackey-Will proceed with EGD and colonoscopy on the 2nd floor due to his comorbidities including obesity, sleep apnea, restrictive lung disease, coronary artery disease.  has loop recorder      ok to hold Eliquis 2 days per Dr Sandhu-must remain    EXTERNAL FIXATION TIBIAL FRACTURE Left 07/01/2016    INSERTION OF IMPLANTABLE LOOP RECORDER  04/07/2017    LEFT HEART CATHETERIZATION Left 1/30/2020    Procedure: Left heart cath;  Surgeon: Benjamin Sandhu MD;  Location: [facility] CATH LAB;  Service: Cardiology;  Laterality: Left;  RN PREOP 1/28/20--Pt starting Plavix loading dose today (8pills)- Dr. Sandhu aware.  Pt has a bandaged "non healing area to LLE"--Dr. Sandhu aware.    LEFT HEART CATHETERIZATION Left 2/14/2020    Procedure: Left heart cath, IVUS guided left main / LAD PCI. Noon start, radial approach;  Surgeon: Miguel Angel Brady MD;  Location: [facility] CATH LAB;  Service: Cardiology;  Laterality: Left;  RN PRE OP 2-7-2020  BMI--45.61    ORIF TIBIA FRACTURE Left 07/15/2016    PROBING OF NASOLACRIMAL DUCT WITH INSERTION OF TUBE Left 11/30/2020    Procedure: PROBING, NASOLACRIMAL DUCT,;  Surgeon: Teresa Cooper MD;  Location: [facility] OR 1ST FLR;  Service: Ophthalmology;  Laterality: Left;    RADIOACTIVE SEED IMPLANTATION OF PROSTATE N/A 8/8/2018    Procedure: INSERTION, RADIOACTIVE SEED, PROSTATE;  Surgeon: Bipin Thompson MD;  Location: [facility] OR 1ST FLR;  Service: Urology;  Laterality: N/A;  1 hour    SKIN " BIOPSY      Squamous cell cancer removal x3 with Mohs surgery      TONSILLECTOMY      TOTAL KNEE ARTHROPLASTY  10/2012    TRIGGER FINGER RELEASE Right 10/14/2020    Procedure: RELEASE, TRIGGER FINGER - right;  Surgeon: Rad Swift MD;  Location: [facility] OR;  Service: Orthopedics;  Laterality: Right;    trus/bx      ULTRASOUND GUIDANCE  2/14/2020    Procedure: Ultrasound Guidance;  Surgeon: Miguel Angel Brady MD;  Location: [facility] CATH LAB;  Service: Cardiology;;     Social History     Tobacco Use    Smoking status: Never Smoker    Smokeless tobacco: Never Used   Substance Use Topics    Alcohol use: Yes     Alcohol/week: 2.0 standard drinks     Types: 2 Cans of beer per week     Comment: occasionally, beer    Drug use: Never     Family History   Problem Relation Age of Onset    Skin cancer Father     Lung cancer Father     Cancer Father         smoker,     Alzheimer's disease Mother     Hypertension Mother     Cancer Sister         colon, lung cancer     No Known Problems Sister     Cancer Brother         skin cancer, polypectomy     Peripheral vascular disease Unknown     No Known Problems Maternal Aunt     No Known Problems Maternal Uncle     No Known Problems Paternal Aunt     No Known Problems Paternal Uncle     No Known Problems Maternal Grandmother     No Known Problems Maternal Grandfather     No Known Problems Paternal Grandmother     No Known Problems Paternal Grandfather     Melanoma Neg Hx     Psoriasis Neg Hx     Lupus Neg Hx     Eczema Neg Hx     Amblyopia Neg Hx     Blindness Neg Hx     Cataracts Neg Hx     Diabetes Neg Hx     Glaucoma Neg Hx     Macular degeneration Neg Hx     Retinal detachment Neg Hx     Strabismus Neg Hx     Stroke Neg Hx     Thyroid disease Neg Hx     Acne Neg Hx      Review of patient's allergies indicates:   Allergen Reactions    Ciprofloxacin Rash     Diffuse pruritic morbilliform rash developed 3/15/2017 after dose of cipro; previously in  2/2017 he had rash/fevers after initiation of cipro    Zosyn [piperacillin-tazobactam] Rash     Diffuse pruritic morbilliform rash developed 3/15/2017.  Then, 430am dose on 3/16 and rash worsened with SOB/tachypnea but no hypoxemia.     Bacitracin Itching and Rash     Violaceous rash in area of topical Tx.        Current Outpatient Medications:     acyclovir (ZOVIRAX) 800 MG Tab, Take 200 mg by mouth 2 (two) times daily., Disp: , Rfl:     amiodarone (PACERONE) 200 MG Tab, Take 1 tablet (200 mg total) by mouth once daily., Disp: 90 tablet, Rfl: 3    artificial tears (ISOPTO TEARS) 0.5 % ophthalmic solution, Place 1 drop into both eyes as needed., Disp: , Rfl:     atorvastatin (LIPITOR) 40 MG tablet, Take 1 tablet (40 mg total) by mouth once daily. (Patient taking differently: Take 40 mg by mouth every evening.), Disp: 90 tablet, Rfl: 1    clopidogreL (PLAVIX) 75 mg tablet, Take 1 tablet by mouth once daily (Patient taking differently: Take by mouth once daily.), Disp: 90 tablet, Rfl: 3    dexAMETHasone (DECADRON) 4 MG Tab, 4mg (1 tablet) by mouth twice a day for two days starting the day after chemo.  Repeat after each dose of chemo., Disp: 24 tablet, Rfl: 1    diphenhydrAMINE-aluminum-magnesium hydroxide-simethicone-LIDOcaine HCl 2%, Swish and spit 15 mLs every 4 (four) hours as needed., Disp: , Rfl:     ELIQUIS 5 mg Tab, Take 1 tablet by mouth twice daily, Disp: 180 tablet, Rfl: 3    furosemide (LASIX) 20 MG tablet, Take 1 tablet (20 mg total) by mouth once daily. (Patient taking differently: Take 20 mg by mouth every morning.), Disp: 90 tablet, Rfl: 3    gabapentin (NEURONTIN) 100 MG capsule, 2 capsules once daily, Disp: 30 capsule, Rfl: 0    gabapentin (NEURONTIN) 300 MG capsule, Take 1 capsule (300 mg total) by mouth 3 (three) times daily., Disp: 270 capsule, Rfl: 1    gly-carb h-poly a-pot hydrox (MUGARD) Soln, by Mucous Membrane route., Disp: , Rfl:     hydrocodone-acetaminophen (HYCET) solution  7.5-325 mg/15mL, Take 15 mLs by mouth every 8 (eight) hours as needed for Pain., Disp: 473 mL, Rfl: 0    LIDOcaine-prilocaine (EMLA) cream, Apply generously to port site 30-60 min prior to chemo and then cover with saran wrap., Disp: 30 g, Rfl: 2    melatonin 5 mg Tab, Take 10 mg by mouth nightly., Disp: , Rfl:     metoprolol succinate (TOPROL-XL) 25 MG 24 hr tablet, Take 1 tablet by mouth once daily (Patient taking differently: Take by mouth every morning.), Disp: 90 tablet, Rfl: 3    nitroGLYCERIN (NITROSTAT) 0.4 MG SL tablet, Place 1 tablet (0.4 mg total) under the tongue every 5 (five) minutes as needed for Chest pain., Disp: 25 tablet, Rfl: 11    ondansetron (ZOFRAN) 8 MG tablet, Take 1 tablet (8 mg total) by mouth every 8 (eight) hours as needed for Nausea., Disp: 30 tablet, Rfl: 3    oxybutynin (DITROPAN-XL) 5 MG TR24, Take 1 tablet (5 mg total) by mouth once daily. (Patient taking differently: Take 5 mg by mouth every evening.), Disp: 30 tablet, Rfl: 11    pantoprazole (PROTONIX) 40 MG tablet, Take 1 tablet (40 mg total) by mouth once daily., Disp: 90 tablet, Rfl: 3    phenylephrine/DM/acetaminop/GG (MUCINEX FAST-MAX SEVERE COLD ORAL), Take by mouth., Disp: , Rfl:     sildenafiL (VIAGRA) 100 MG tablet, Take 1 tablet (100 mg total) by mouth daily as needed., Disp: 10 tablet, Rfl: 11    silver sulfADIAZINE 1% (SILVADENE) 1 % cream, Apply topically., Disp: , Rfl:     sucralfate (CARAFATE) 100 mg/mL suspension, , Disp: , Rfl:     tamsulosin (FLOMAX) 0.4 mg Cap, Take 1 capsule (0.4 mg total) by mouth every evening., Disp: 90 capsule, Rfl: 3    traZODone (DESYREL) 50 MG tablet, Take 1 tablet (50 mg total) by mouth nightly as needed for Insomnia. (Patient not taking: Reported on 7/19/2022), Disp: 30 tablet, Rfl: 2    venlafaxine (EFFEXOR) 75 MG tablet, Take 2 tablets (150 mg total) by mouth 2 (two) times daily. (Patient taking differently: Take 75 mg by mouth every morning.), Disp: 360 tablet, Rfl:  "0    vitamin D 1000 units Tab, Take 1 tablet (1,000 Units total) by mouth once daily. (Patient taking differently: Take 2,000 Units by mouth once daily.), Disp: , Rfl:          Review of Systems as per HPI  Review of Systems   Unable to perform ROS: Critical illness       Physical Exam     ED Triage Vitals [08/10/22 2201]   Enc Vitals Group      BP (!) 112/56      Pulse (!) 115      Resp 20      Temp 97.6 °F (36.4 °C)      Temp src Oral      SpO2 100 %      Weight (!) 312 lb      Height 6' 1"      Head Circumference       Peak Flow       Pain Score       Pain Loc       Pain Edu?       Excl. in GC?      Vitals:    08/11/22 0200 08/11/22 0230 08/11/22 0300 08/11/22 0400   BP: 121/60 (!) 124/49 (!) 109/49 (!) 106/51   Pulse: (!) 120 (!) 116 (!) 114 (!) 112   Resp: 20 20 20 20   Temp: 98.6 °F (37 °C)   98.9 °F (37.2 °C)   TempSrc: Oral   Oral   SpO2: 99% 99% 99% 99%   Weight:       Height:           Vital signs and nursing assessment noted: tachycardia    GEN:   Mild distress, A & Ox3, atraumatic,  Ill and toxic appearing  HEENT:  PERRLA, EOMI, moist membranes, nl conjunctiva, no scleral icterus, no nystagmus, no nodes/nodules, soft, supple, FROM, no trachial deviation  CV:   Tachcyardia, regular rhythm,  no m/r/g, 2+ radial pulses, <2sec cap refill  Physical Exam  Pulmonary:      Effort: Tachypnea and respiratory distress present. No bradypnea, accessory muscle usage, prolonged expiration or retractions.      Breath sounds: Decreased air movement present. No stridor or transmitted upper airway sounds. Decreased breath sounds present. No wheezing or rhonchi.   Skin:     General: Skin is warm.      Capillary Refill: Capillary refill takes less than 2 seconds.      Findings: Erythema and rash (sloughing of skin around neck and upper chest) present. No acne, bruising, burn, ecchymosis or signs of injury.         ABD:   soft, Nontender, Nondistended, +BS, no guarding/rebound  :   Deferred  EXT:   FROM, BRANDT x 4, no " swelling, no edema, no calf tenderness, no bony tenderness, no warmth or redness, no crepitus, no obvious deformity  LYMPH:  no gross adenopathy  NEURO:  GCS 15, CN II-XII grossly intact, no obvious motor/sensory deficit, no tremor  PSYCH:   no SI/HI, no anxiety, nl mood/affect, nl judgement/thought process     Tests   Significant Labs and/or Imaging: I have reviewed all pertinent results/findings within the past 24 hours.  Labs Reviewed   CBC W/ AUTO DIFFERENTIAL - Abnormal; Notable for the following components:       Result Value    RBC 2.54 (*)     Hemoglobin 9.2 (*)     Hematocrit 26.4 (*)      (*)     MCH 36.2 (*)     RDW 17.1 (*)     Platelets 94 (*)     Lymph # 0.2 (*)     Mono # 0.1 (*)     Gran % 92.6 (*)     Lymph % 4.9 (*)     Mono % 1.9 (*)     All other components within normal limits   COMPREHENSIVE METABOLIC PANEL - Abnormal; Notable for the following components:    CO2 30 (*)     Glucose 131 (*)     Creatinine 1.7 (*)     Albumin 3.1 (*)     Total Bilirubin 2.3 (*)     Alkaline Phosphatase 150 (*)     eGFR 42 (*)     All other components within normal limits   LACTIC ACID, PLASMA - Abnormal; Notable for the following components:    Lactate (Lactic Acid) 4.2 (*)     All other components within normal limits    Narrative:     Lactic Acid critical result(s) called and verbal readback obtained   from Cyndy BEDOYA RN ED  by 1CD 08/10/2022 23:11   URINALYSIS, REFLEX TO URINE CULTURE - Abnormal; Notable for the following components:    Appearance, UA Hazy (*)     Protein, UA 1+ (*)     Glucose, UA 1+ (*)     Occult Blood UA Trace (*)     Urobilinogen, UA 2.0-3.0 (*)     Leukocytes, UA 2+ (*)     All other components within normal limits    Narrative:     Specimen Source->Urine   MAGNESIUM - Abnormal; Notable for the following components:    Magnesium 1.5 (*)     All other components within normal limits   TROPONIN I - Abnormal; Notable for the following components:    Troponin I 0.028 (*)     All other  components within normal limits   LACTIC ACID, PLASMA - Abnormal; Notable for the following components:    Lactate (Lactic Acid) 2.8 (*)     All other components within normal limits    Narrative:     Complete after fluids   URINALYSIS MICROSCOPIC - Abnormal; Notable for the following components:    RBC, UA 6 (*)     WBC, UA 38 (*)     Hyaline Casts, UA 27 (*)     WBC Casts, UA 20 (*)     Granular Casts, UA 13 (*)     All other components within normal limits    Narrative:     Specimen Source->Urine   CULTURE, BLOOD   CULTURE, BLOOD   CULTURE, URINE   CLOSTRIDIUM DIFFICILE   POCT INFLUENZA A/B MOLECULAR   SARS-COV-2 RDRP GENE   POCT GLUCOSE MONITORING CONTINUOUS     X-Ray Chest AP Portable   Final Result      No acute cardiopulmonary process identified.         Electronically signed by: Boom Wilson MD   Date:    08/10/2022   Time:    23:48        Results for orders placed or performed during the hospital encounter of 07/29/22   SCHEDULED EKG 12-LEAD (to Muse)    Collection Time: 07/29/22 12:44 PM    Narrative    Test Reason : I47.9,    Vent. Rate : 118 BPM     Atrial Rate : 000 BPM     P-R Int : 000 ms          QRS Dur : 096 ms      QT Int : 326 ms       P-R-T Axes : 000 048 077 degrees     QTc Int : 456 ms    Atrial fibrillation with rapid ventricular response  Abnormal ECG  When compared with ECG of 19-JUL-2022 14:11,  Significant changes have occurred  Confirmed by Ramón Joyner MD [id number]) on 8/1/2022 7:09:34 AM    Referred By: LEIGH GARCIA           Confirmed By:Ramón Joyner MD     EKG    No sTEMI  sinus tachycardia with    occasional Premature ventricular complexes  Septal infarct ,age undetermined  Abnormal ECG  When compared with ECG of 29-JUL-*    Assessment/Plan:     * Sepsis  This patient does have evidence of infective focus  My overall impression is sepsis. Vital signs were reviewed and noted in progress note.  Antibiotics given-   Antibiotics (From admission, onward)            Start     Stop  "Route Frequency Ordered    08/11/22 2330  vancomycin (VANCOCIN) 2,000 mg in dextrose 5 % 500 mL IVPB         -- IV Every 24 hours (non-standard times) 08/11/22 0414    08/11/22 0600  metroNIDAZOLE tablet 500 mg         -- Oral Every 8 hours 08/11/22 0411    08/11/22 0507  vancomycin - pharmacy to dose  (Unknown source / Early in Workup)        "And" Linked Group Details    -- IV pharmacy to manage frequency 08/11/22 0409        Cultures were taken-   Microbiology Results (last 7 days)     Procedure Component Value Units Date/Time    Clostridium difficile EIA [[id number]]     Order Status: No result Specimen: Stool     Urine culture [[id number]] Collected: 08/11/22 0018    Order Status: No result Specimen: Urine Updated: 08/11/22 0047    Blood culture x two cultures. Draw prior to antibiotics. [[id number]] Collected: 08/10/22 2235    Order Status: Sent Specimen: Blood from Peripheral, Antecubital, Right Updated: 08/10/22 2252    Blood culture x two cultures. Draw prior to antibiotics. [[id number]] Collected: 08/10/22 2242    Order Status: Sent Specimen: Blood from Peripheral, Antecubital, Left Updated: 08/10/22 2252        Latest lactate reviewed, they are-  Recent Labs   Lab 08/10/22  2244 08/11/22  0220   LACTATE 4.2* 2.8*       Organ dysfunction indicated by Acute respiratory failure  Source- diarrhea, UTI vs cellulits  ID consulted      Acute respiratory insufficiency  Tachypnea in setting of sepsis with history of COPD.    With hypoxemia  will give supplemental oxygen be titrated to a target of 88 to 92 percent pulse oxygen saturation, rather than using high-flow, nontitrated oxygen, inhaled short-acting bronchodilator therapy, course of systemic antibiotics.    Consider NIV    Abnormal urinalysis  Component 7/22/22   Urine Culture, Routine  Abnormal   PROVIDENCIA RETTGERI   >100,000 cfu/ml     Resulting Agency [facility]          Susceptibility     Providencia rettgeri     CULTURE, URINE     Amp/Sulbactam 16/8 mcg/mL " Intermediate     Cefepime <=2 mcg/mL Sensitive     Ceftriaxone <=1 mcg/mL Sensitive     Ciprofloxacin <=1 mcg/mL Sensitive     Ertapenem <=0.5 mcg/mL Sensitive     Gentamicin <=4 mcg/mL Sensitive     Levofloxacin <=2 mcg/mL Sensitive     Meropenem <=1 mcg/mL Sensitive     Minocycline 8 mcg/mL Intermediate     Piperacillin/Tazo <=16 mcg/mL Sensitive     Tobramycin <=4 mcg/mL Sensitive     Trimeth/Sulfa <=2/38 mcg/mL Sensitive               Linear View                    Stage 3a chronic kidney disease  IVF to augment the urine output and relieve symptoms. Monitor UOP, serum creatinine, electrolytes, albumin, and measures of fluid balance.     Paroxysmal atrial fibrillation   Patient with Paroxysmal (<7 days) atrial fibrillation which is controlled currently with Amiodarone. Patient is currently in sinus rhythm.[id number] Score: 3. HASBLED Score: Unable to calculate. Anticoagulation indicated. Anticoagulation done with Eliquis.        CHARISMA on CPAP  Monitor.      Coronary artery disease involving native coronary artery of native heart without angina pectoris s/p RCA stent      Somewhat atypical and possibly related to CKD  Patient has mild troponin elevation without any new EKG changes  Currently chest pain-free with advanced age.  Will currently pursue conservative treatment    Further medical treatment such as ACE, beta blockade, antiplatelet therapy per day inpatient team    VTE Risk Mitigation (From admission, onward)         Ordered     apixaban tablet 5 mg  2 times daily         08/11/22 0409     IP VTE HIGH RISK PATIENT  Once         08/11/22 0409     Place sequential compression device  Until discontinued         08/11/22 0409                   Redd Watson MD  Department of Hospital Medicine   [facility] - [place] - Emergency Dept

## 2022-08-12 PROBLEM — D61.818 PANCYTOPENIA: Status: ACTIVE | Noted: 2022-01-01

## 2022-08-12 PROBLEM — R82.90 ABNORMAL URINALYSIS: Status: RESOLVED | Noted: 2022-01-01 | Resolved: 2022-01-01

## 2022-08-12 NOTE — PLAN OF CARE
Problem: Adult Inpatient Plan of Care  Goal: Plan of Care Review  Outcome: Ongoing, Progressing  Goal: Patient-Specific Goal (Individualized)  Outcome: Ongoing, Progressing  Goal: Absence of Hospital-Acquired Illness or Injury  Outcome: Ongoing, Progressing  Goal: Optimal Comfort and Wellbeing  Outcome: Ongoing, Progressing  Goal: Readiness for Transition of Care  Outcome: Ongoing, Progressing     Problem: Bariatric Environmental Safety  Goal: Safety Maintained with Care  Outcome: Ongoing, Progressing     Problem: Fall Injury Risk  Goal: Absence of Fall and Fall-Related Injury  Outcome: Ongoing, Progressing      Patient remained free from falls/injury throughout shift.  No acute changes in status.  Bed locked in lowest position.  Side rails up x2.  Call bell within reach.  Purposeful rounding maintained throughout shift.

## 2022-08-12 NOTE — PROGRESS NOTES
"[facility]  Hematology/Oncology  Progress Note    Patient Name: Janusz Montgomery Jr.  Admission Date: 8/10/2022  Hospital Length of Stay: 1 days  Code Status: Full Code     Subjective:     HPI:  77 y.o. 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 He reports profuse watery diarrhea x 1 day. He had a couple of episodes of vomiting.Patient states he could "barely get off the toilet." He reports intermittent nausea and episode of vomiting. He has intermittent dysphagia ,odynophagia and loss of taste He reports pain well-controlled with current pain regimen . He is hearing impaired. CXR unremarkable. He is admitted with sepsis. Consulted for head and neck cancer not tolerating treatment.         Interval History: Feels better since getting IV fluids.  Still having intermittent nausea, particularly after eating.  Has not tried pre-medicating prior to meals.      Oncology Treatment Plan:   OP CARBOPLATIN WEEKLY    Medications:  Continuous Infusions:   sodium chloride 0.9% 125 mL/hr at 08/12/22 0933     Scheduled Meds:   amiodarone  200 mg Oral Daily    apixaban  5 mg Oral BID    atorvastatin  40 mg Oral QHS    clopidogreL  75 mg Oral Daily    gabapentin  100 mg Oral BID    magic mouthwash (diphenhydrAMINE 12.5 mg/5 mL 20 mL, aluminum & magnesium hydroxide-simethicone (MYLANTA) 20 mL, LIDOcaine HCl 2% (XYLOCAINE) 20 mL) solution  15 mL Swish & Spit Q6H WAKE    melatonin  9 mg Oral Nightly    metoprolol succinate  25 mg Oral Daily    ondansetron  8 mg Intravenous Q8H     PRN Meds:acetaminophen, albuterol-ipratropium, dextrose 10%, dextrose 10%, glucagon (human recombinant), glucose, glucose, hydrocodone-apap 7.5-325 MG/15 ML, morphine, sodium chloride 0.9%     Review of Systems   Constitutional:  Positive for appetite change.   HENT:  Positive for sore throat.    Gastrointestinal:  Positive for " nausea. Negative for diarrhea.   Genitourinary:  Negative for hematuria.   Neurological:  Positive for weakness.   Psychiatric/Behavioral:  Negative for confusion.    Objective:     Vital Signs (Most Recent):  Temp: 97.7 °F (36.5 °C) (08/12/22 1130)  Pulse: 78 (08/12/22 1130)  Resp: 20 (08/12/22 1130)  BP: (!) 109/57 (08/12/22 1130)  SpO2: 96 % (08/12/22 1130)   Vital Signs (24h Range):  Temp:  [97.7 °F (36.5 °C)-98.1 °F (36.7 °C)] 97.7 °F (36.5 °C)  Pulse:  [] 78  Resp:  [20-24] 20  SpO2:  [93 %-100 %] 96 %  BP: (109-152)/(47-63) 109/57     Weight: (!) 141.5 kg (312 lb)  Body mass index is 41.16 kg/m².  Body surface area is 2.7 meters squared.      Intake/Output Summary (Last 24 hours) at 8/12/2022 1438  Last data filed at 8/12/2022 0821  Gross per 24 hour   Intake 2471.6 ml   Output 1600 ml   Net 871.6 ml       Physical Exam  Constitutional:       General: He is not in acute distress.     Appearance: He is obese.   HENT:      Nose: Nose normal.   Eyes:      Extraocular Movements: Extraocular movements intact.      Conjunctiva/sclera: Conjunctivae normal.      Pupils: Pupils are equal, round, and reactive to light.   Cardiovascular:      Rate and Rhythm: Normal rate.   Pulmonary:      Effort: Pulmonary effort is normal.   Abdominal:      General: There is distension.   Musculoskeletal:         General: Normal range of motion.      Cervical back: Normal range of motion.   Skin:     General: Skin is dry.   Neurological:      General: No focal deficit present.      Mental Status: He is alert.       Significant Labs:   CBC:   Recent Labs   Lab 08/10/22  2244 08/12/22  0529 08/12/22  1312   WBC 4.27 4.81 5.87   HGB 9.2* 6.9* 7.0*   HCT 26.4* 20.5* 20.5*   PLT 94* 72* 81*    and CMP:   Recent Labs   Lab 08/10/22  2244 08/12/22  0529    142   K 4.0 4.0    104   CO2 30* 30*   * 125*   BUN 14 18   CREATININE 1.7* 2.3*   CALCIUM 9.4 8.3*   PROT 6.3 5.0*   ALBUMIN 3.1* 2.5*   BILITOT 2.3* 1.2*    ALKPHOS 150* 101   AST 15 20   ALT 19 19   ANIONGAP 11 8       Diagnostic Results:  I have reviewed all pertinent imaging results/findings within the past 24 hours.  X-Ray Chest PA And Lateral    Result Date: 7/29/2022  EXAMINATION: XR CHEST PA AND LATERAL CLINICAL HISTORY: shortness of breath; Shortness of breath TECHNIQUE: PA and lateral views of the chest were performed. COMPARISON: Chest radiograph 06/10/2022 FINDINGS: Interval placement of an RIJV-approach infusion port catheter with terminus projecting over the expected location of the caval atrial junction. Cardiomediastinal silhouette is within normal limits. Thin persistent trace loculated pleural effusion extends along the posterior aspect of the left hemithorax, unchanged.  No appreciable new focal consolidation, overt interstitial edema, sizable pleural effusion or pneumothorax.  Bilateral calcified pleural plaques, unchanged. Multilevel degenerative changes of the imaged spine.     1. No acute cardiopulmonary process appreciated. Electronically signed by: Álvaro Comer Date:    07/29/2022 Time:    12:45    X-Ray Chest AP Portable    Result Date: 8/10/2022  EXAMINATION: XR CHEST AP PORTABLE CLINICAL HISTORY: Sepsis; TECHNIQUE: Single frontal view of the chest was performed. COMPARISON: 07/29/2022. FINDINGS: Cardiac silhouette is normal in size.  Right-sided chest port is visualized in stable position.  Loop recorder device is noted.  Lungs are hypoinflated.  Calcified pleural plaques are seen.  No evidence of new focal consolidative process, pneumothorax, or significant pleural effusion.  No acute osseous abnormality identified.     No acute cardiopulmonary process identified. Electronically signed by: Boom Wilson MD Date:    08/10/2022 Time:    23:48       Assessment/Plan:     Pancytopenia  Secondary to chemoradiation.  Decline in nutrition may also be contributing  -monior cbc daily  --transfuse for Hg <7 and plt <10 or uncontrolled  bleeding.  -scheduled zofran to help with nausea and improve caloric intake    Squamous cell carcinoma of neck  S/p chemoradiation.  Cont supportive care  Cont IVF hydration  Replete lytes  Scheduled magic mouth wash for sore throat  Cont current pain regimen with  hycet for mod pain and IV morphine severe pain              Thank you for your consult. I will follow-up with patient. Please contact us if you have any additional questions.     Robert Hargrove MD  Hematology/Oncology  [facility]

## 2022-08-12 NOTE — ASSESSMENT & PLAN NOTE
Pt f/b Dr. Hargrove  Pt undergoing chemoradiation with carboplatin  Last carbo 8/1/22  Cont supportive care  Cont IVF hydration  Replete lytes  Plan magic mouthwash for sore throat  Cont current pain regimen with  hycet for mod pain and IV morphine severe pain    Consult nutritionist  Dr. Hargrove will follow-up with pt   Consult palliative care        Consult palliative care

## 2022-08-12 NOTE — PROGRESS NOTES
[facility] Hospital Medicine  Progress Note    Patient Name: Janusz Montgomery Jr.  MRN: [id number]  Patient Class: IP- Inpatient   Admission Date: 8/10/2022  Length of Stay: 1 days  Attending Physician: Gonzalo Miranda MD  Primary Care Provider: Miguelina Weathers MD        Subjective:     Principal Problem:Sepsis        HPI:  No notes on file    Overview/Hospital Course:  78 y/o male with hx of head and neck cancer on chemo treatment presents with progressive weakness.  Noted to be tachycardic and with elevated lactic acid.  Initially admitted with possible sepsis and started on broad spectrum ABx's.  Also with dehydration and started on IVF hydration.        Interval History: feeling better.    Review of Systems   HENT:  Negative for ear discharge and ear pain.    Eyes:  Negative for discharge and itching.   Endocrine: Negative for cold intolerance and heat intolerance.   Neurological:  Negative for seizures and syncope.   Objective:     Vital Signs (Most Recent):  Temp: 97.7 °F (36.5 °C) (08/12/22 1130)  Pulse: 78 (08/12/22 1130)  Resp: 20 (08/12/22 1130)  BP: (!) 109/57 (08/12/22 1130)  SpO2: 96 % (08/12/22 1130)   Vital Signs (24h Range):  Temp:  [97.7 °F (36.5 °C)-98.1 °F (36.7 °C)] 97.7 °F (36.5 °C)  Pulse:  [] 78  Resp:  [20-24] 20  SpO2:  [93 %-100 %] 96 %  BP: (109-152)/(47-63) 109/57     Weight: (!) 141.5 kg (312 lb)  Body mass index is 41.16 kg/m².    Intake/Output Summary (Last 24 hours) at 8/12/2022 1559  Last data filed at 8/12/2022 0821  Gross per 24 hour   Intake 2471.6 ml   Output 1600 ml   Net 871.6 ml      Physical Exam  Constitutional:       General: He is not in acute distress.     Appearance: He is not diaphoretic.   HENT:      Head: Normocephalic and atraumatic.      Mouth/Throat:      Mouth: Mucous membranes are dry.      Pharynx: No oropharyngeal exudate or posterior oropharyngeal erythema.   Cardiovascular:      Rate and Rhythm: Normal rate and regular rhythm.   Pulmonary:       Comments: Slight tachypnea.  Slight bilateral expiratory wheezing.  Abdominal:      General: Bowel sounds are normal.      Palpations: Abdomen is soft.   Musculoskeletal:      Right lower leg: Edema present.      Left lower leg: Edema present.   Skin:     Comments: Dry, scaly, erythema around neckline.  Erythema to bilateral lower extremities.  No warmth.   Neurological:      Mental Status: He is oriented to person, place, and time.      Cranial Nerves: No cranial nerve deficit.       Significant Labs: All pertinent labs within the past 24 hours have been reviewed.  BMP:   Recent Labs   Lab 08/12/22  0529   *      K 4.0      CO2 30*   BUN 18   CREATININE 2.3*   CALCIUM 8.3*   MG 1.8     CBC:   Recent Labs   Lab 08/10/22  2244 08/12/22  0529 08/12/22  1312   WBC 4.27 4.81 5.87   HGB 9.2* 6.9* 7.0*   HCT 26.4* 20.5* 20.5*   PLT 94* 72* 81*       Significant Imaging: I have reviewed all pertinent imaging results/findings within the past 24 hours.      Assessment/Plan:      * Sepsis  Patient presented with tachycardia and elevated lactic acid.  Empirically started on ABx's.  Abnormal UA, but no urinary symptoms.  All cultures negative so far.  Stop all ABx's.  Continue on IVF hydration.  Pending Cdiff PCR, but patient with just one bowel movement today.  Afebrile     Pancytopenia  Noted drop in H/H and Platelets.  Could be related to hemodilution.  Anemia and thrombocytopenia secondary to chemotherapy.  No evidence of bleeding.  Repeat H/H in Am and transfuse if lower or if patient symptomatic.      Weakness        Squamous cell carcinoma of neck  Patient with decreasing oral intake and worsening generalized weakness.  Concerned about possible deterioration of functional and nutrition status and presentation related to cancer +/- treatment.  Heme/Onc consulted.      Acute respiratory insufficiency  Patient with chronic hypoxemic respiratory failure on home oxygen.  Seems to be at baseline.    Stage 3a  chronic kidney disease  Baseline Creat around 1.5.  Presents with Creat of 1.7, increased to 2.3 today.  Continue IVF hydration.  Avoid nephrotoxic medications.  Repeat labs in Am.  Closely monitor renal function.  Nephrology consult in Am if Creat higher.    Paroxysmal atrial fibrillation   Patient with Paroxysmal (<7 days) atrial fibrillation which is controlled currently with Amiodarone. Patient is currently in sinus rhythm.[id number] Score: 3. HASBLED Score: Unable to calculate. Anticoagulation indicated. Anticoagulation done with Eliquis.        CHARISMA on CPAP  Nightly CPAP      Coronary artery disease involving native coronary artery of native heart without angina pectoris s/p RCA stent  Chest pain free.  Continue Plavix.      VTE Risk Mitigation (From admission, onward)         Ordered     apixaban tablet 5 mg  2 times daily         08/11/22 0409     IP VTE HIGH RISK PATIENT  Once         08/11/22 0409     Place sequential compression device  Until discontinued         08/11/22 0409                Discharge Planning   KIARA:      Code Status: Full Code   Is the patient medically ready for discharge?:     Reason for patient still in hospital (select all that apply): Patient unstable  Discharge Plan A: Home with family                  Gonzalo Miranda MD  Department of Hospital Medicine   [facility]

## 2022-08-12 NOTE — PLAN OF CARE
Problem: SLP  Goal: SLP Goal  Description: Goals:  1. Pt will tolerate regular diet with thin liquids w/o overt s/s of aspiration across x2 consecutive sessions (2 of 2 met 8/12).  Outcome: Met

## 2022-08-12 NOTE — ASSESSMENT & PLAN NOTE
Noted drop in H/H and Platelets.  Could be related to hemodilution.  Anemia and thrombocytopenia secondary to chemotherapy.  No evidence of bleeding.  Repeat H/H in Am and transfuse if lower or if patient symptomatic.

## 2022-08-12 NOTE — SUBJECTIVE & OBJECTIVE
Interval History: Feels better since getting IV fluids.  Still having intermittent nausea, particularly after eating.  Has not tried pre-medicating prior to meals.      Oncology Treatment Plan:   OP CARBOPLATIN WEEKLY    Medications:  Continuous Infusions:   sodium chloride 0.9% 125 mL/hr at 08/12/22 0933     Scheduled Meds:   amiodarone  200 mg Oral Daily    apixaban  5 mg Oral BID    atorvastatin  40 mg Oral QHS    clopidogreL  75 mg Oral Daily    gabapentin  100 mg Oral BID    magic mouthwash (diphenhydrAMINE 12.5 mg/5 mL 20 mL, aluminum & magnesium hydroxide-simethicone (MYLANTA) 20 mL, LIDOcaine HCl 2% (XYLOCAINE) 20 mL) solution  15 mL Swish & Spit Q6H WAKE    melatonin  9 mg Oral Nightly    metoprolol succinate  25 mg Oral Daily    ondansetron  8 mg Intravenous Q8H     PRN Meds:acetaminophen, albuterol-ipratropium, dextrose 10%, dextrose 10%, glucagon (human recombinant), glucose, glucose, hydrocodone-apap 7.5-325 MG/15 ML, morphine, sodium chloride 0.9%     Review of Systems   Constitutional:  Positive for appetite change.   HENT:  Positive for sore throat.    Gastrointestinal:  Positive for nausea. Negative for diarrhea.   Genitourinary:  Negative for hematuria.   Neurological:  Positive for weakness.   Psychiatric/Behavioral:  Negative for confusion.    Objective:     Vital Signs (Most Recent):  Temp: 97.7 °F (36.5 °C) (08/12/22 1130)  Pulse: 78 (08/12/22 1130)  Resp: 20 (08/12/22 1130)  BP: (!) 109/57 (08/12/22 1130)  SpO2: 96 % (08/12/22 1130)   Vital Signs (24h Range):  Temp:  [97.7 °F (36.5 °C)-98.1 °F (36.7 °C)] 97.7 °F (36.5 °C)  Pulse:  [] 78  Resp:  [20-24] 20  SpO2:  [93 %-100 %] 96 %  BP: (109-152)/(47-63) 109/57     Weight: (!) 141.5 kg (312 lb)  Body mass index is 41.16 kg/m².  Body surface area is 2.7 meters squared.      Intake/Output Summary (Last 24 hours) at 8/12/2022 1438  Last data filed at 8/12/2022 0821  Gross per 24 hour   Intake 2471.6 ml   Output 1600 ml   Net 871.6 ml        Physical Exam  Constitutional:       General: He is not in acute distress.     Appearance: He is obese.   HENT:      Nose: Nose normal.   Eyes:      Extraocular Movements: Extraocular movements intact.      Conjunctiva/sclera: Conjunctivae normal.      Pupils: Pupils are equal, round, and reactive to light.   Cardiovascular:      Rate and Rhythm: Normal rate.   Pulmonary:      Effort: Pulmonary effort is normal.   Abdominal:      General: There is distension.   Musculoskeletal:         General: Normal range of motion.      Cervical back: Normal range of motion.   Skin:     General: Skin is dry.   Neurological:      General: No focal deficit present.      Mental Status: He is alert.       Significant Labs:   CBC:   Recent Labs   Lab 08/10/22  2244 08/12/22  0529 08/12/22  1312   WBC 4.27 4.81 5.87   HGB 9.2* 6.9* 7.0*   HCT 26.4* 20.5* 20.5*   PLT 94* 72* 81*    and CMP:   Recent Labs   Lab 08/10/22  2244 08/12/22  0529    142   K 4.0 4.0    104   CO2 30* 30*   * 125*   BUN 14 18   CREATININE 1.7* 2.3*   CALCIUM 9.4 8.3*   PROT 6.3 5.0*   ALBUMIN 3.1* 2.5*   BILITOT 2.3* 1.2*   ALKPHOS 150* 101   AST 15 20   ALT 19 19   ANIONGAP 11 8       Diagnostic Results:  I have reviewed all pertinent imaging results/findings within the past 24 hours.  X-Ray Chest PA And Lateral    Result Date: 7/29/2022  EXAMINATION: XR CHEST PA AND LATERAL CLINICAL HISTORY: shortness of breath; Shortness of breath TECHNIQUE: PA and lateral views of the chest were performed. COMPARISON: Chest radiograph 06/10/2022 FINDINGS: Interval placement of an RIJV-approach infusion port catheter with terminus projecting over the expected location of the caval atrial junction. Cardiomediastinal silhouette is within normal limits. Thin persistent trace loculated pleural effusion extends along the posterior aspect of the left hemithorax, unchanged.  No appreciable new focal consolidation, overt interstitial edema, sizable pleural  effusion or pneumothorax.  Bilateral calcified pleural plaques, unchanged. Multilevel degenerative changes of the imaged spine.     1. No acute cardiopulmonary process appreciated. Electronically signed by: Álvaro Comer Date:    07/29/2022 Time:    12:45    X-Ray Chest AP Portable    Result Date: 8/10/2022  EXAMINATION: XR CHEST AP PORTABLE CLINICAL HISTORY: Sepsis; TECHNIQUE: Single frontal view of the chest was performed. COMPARISON: 07/29/2022. FINDINGS: Cardiac silhouette is normal in size.  Right-sided chest port is visualized in stable position.  Loop recorder device is noted.  Lungs are hypoinflated.  Calcified pleural plaques are seen.  No evidence of new focal consolidative process, pneumothorax, or significant pleural effusion.  No acute osseous abnormality identified.     No acute cardiopulmonary process identified. Electronically signed by: Boom Wilson MD Date:    08/10/2022 Time:    23:48

## 2022-08-12 NOTE — PT/OT/SLP PROGRESS
Speech Language Pathology Treatment    Patient Name:  Janusz Montgomery Jr.   MRN:  [id number]  Admitting Diagnosis: Sepsis    Recommendations:                 General Recommendations:  HH ST to address swallowing with cont chemo/radiation  Diet recommendations:  Regular Diet - IDDSI Level 7, Liquid Diet Level: Thin   Aspiration Precautions: Alternating bites/sips, HOB to 90 degrees, Meds whole 1 at a time and Small bites/sips   General Precautions: Standard,    Communication strategies:  none    Subjective     Upon ST entrance, pt was seated EOB consuming breakfast tray, with wife present at b/s. Pt greeted ST and was agreeable to tx session.     Pain/Comfort:  · Pain Rating 1: 0/10    Respiratory Status: Nasal cannula, flow 5 L/min    Objective:     Has the patient been evaluated by SLP for swallowing?   Yes  Keep patient NPO? No   Current Respiratory Status:        The pt consumed minimal portions of breakfast tray which included grits and eggs, with single straw sips of juice. Pt with adequate oral prep across soft consistencies. Pt expressing dcr pain when swallowing today, although general dry oral cavity and throat persist. No overt s/s of aspiration across consistencies. Pt expressing dcr appetite, and early satiation. ST educated the pt on attempting to consume 4-5 smaller meals throughout the day to A with appetite, rather than 3 larger meals. Pt was agreeable to all information presented.     Pt expressing previous physician recommending ST once chemo/radiation began, however, ST was never scheduled. Pt stated he would like to cont ST upon discharge. ST messaged MD via secure chat in regards to recs for HH ST to address swallowing as the pt cont chemo/radiation.     Assessment:     Janusz Montgomery Jr. is a 77 y.o. 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 ST recs cont current softer diet, with specific food items listed in diet  order. No further ST is needed in the acute setting, however, ST recs HH ST upon d/c to address swallowing with cont chemo/radiation.     Goals:   Multidisciplinary Problems     SLP Goals     Not on file          Multidisciplinary Problems (Resolved)        Problem: SLP    Goal Priority Disciplines Outcome   SLP Goal   (Resolved)    Low SLP Met   Description: Goals:  1. Pt will tolerate regular diet with thin liquids w/o overt s/s of aspiration across x2 consecutive sessions (2 of 2 met 8/12).                   Plan:     · Patient to be seen:  2 x/week   · Plan of Care expires:     · Plan of Care reviewed with:  patient, daughter, sibling   · SLP Follow-Up:  No       Discharge recommendations:    Home health ST  Barriers to Discharge:  None    Time Tracking:     SLP Treatment Date:   08/12/22  Speech Start Time:  0930  Speech Stop Time:  0950     Speech Total Time (min):  20 min    Billable Minutes: Treatment Swallowing Dysfunction 20 08/12/2022

## 2022-08-12 NOTE — CONSULTS
"[facility] Surg  Hematology/Oncology  Consult Note    Patient Name: Janusz Montgomery Jr.  MRN: [id number]  Admission Date: 8/10/2022  Hospital Length of Stay: 0 days  Code Status: Full Code   Attending Provider: Gonzalo Miranda MD  Consulting Provider: Crystal Arias MD  Primary Care Physician: Miguelina Weathers MD  Principal Problem:Sepsis    Consults  Subjective:     HPI:  77 y.o. 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 He reports profuse watery diarrhea x 1 day. He had a couple of episodes of vomiting.Patient states he could "barely get off the toilet." He reports intermittent nausea and episode of vomiting. He has intermittent dysphagia ,odynophagia and loss of taste He reports pain well-controlled with current pain regimen . He is hearing impaired. CXR unremarkable. He is admitted with sepsis. Consulted for head and neck cancer not tolerating treatment.           VIRTUAL TELENOTE    Start time:4:15pm  Chief complaint: Weakness  The patient location is [facility]   The patient arrived at: 4:21pm  Present with the patient at the time of the telemed/virtual assessment:wife    End time:  4:35pm     Total time spent with patient: 20 min    The attending portion of this evaluation, treatment, and documentation was performed per Crystal Arias MD via Telemedicine AudioVisual using the secure [employer] software platform with 2 way audio/video. The provider was located off-site and the patient is located in the hospital. The aforementioned video software was utilized to document the relevant history and physical exam.     Oncology Treatment Plan:   OP CARBOPLATIN WEEKLY    Medications:  Continuous Infusions:   sodium chloride 0.9% 125 mL/hr at 08/11/22 1421     Scheduled Meds:   amiodarone  200 mg Oral Daily    apixaban  5 mg Oral BID    atorvastatin  40 mg Oral QHS    clopidogreL  75 mg Oral Daily    " gabapentin  100 mg Oral BID    melatonin  9 mg Oral Nightly    metoprolol succinate  25 mg Oral Daily    vancomycin (VANCOCIN) IVPB  15 mg/kg Intravenous Q24H     PRN Meds:acetaminophen, albuterol-ipratropium, dextrose 10%, dextrose 10%, glucagon (human recombinant), glucose, glucose, hydrocodone-apap 7.5-325 MG/15 ML, morphine, ondansetron, sodium chloride 0.9%, Pharmacy to dose Vancomycin consult **AND** vancomycin - pharmacy to dose     Review of patient's allergies indicates:   Allergen Reactions    Ciprofloxacin Rash     Diffuse pruritic morbilliform rash developed 3/15/2017 after dose of cipro; previously in 2/2017 he had rash/fevers after initiation of cipro    Zosyn [piperacillin-tazobactam] Rash     Diffuse pruritic morbilliform rash developed 3/15/2017.  Then, 430am dose on 3/16 and rash worsened with SOB/tachypnea but no hypoxemia.     Bacitracin Itching and Rash     Violaceous rash in area of topical Tx.         Past Medical History:   Diagnosis Date    (HFpEF) heart failure with preserved ejection fraction 02/04/2022    NAT (acute kidney injury) 03/19/2017    ALLERGIC RHINITIS     Anemia     Anxiety     Basal cell carcinoma 10/19/2018    forehead and right medial shoulder    Basal cell carcinoma 01/09/2019    left nasal bridge and left posterior ear    Basal cell carcinoma 06/12/2020    left lower medial leg - Efudex    Basal cell carcinoma 11/30/2020    left medial upper eyelid canthus superficial BCC    Chronic rhinitis 05/03/2013    Chronic rhinitis 05/03/2013    Coronary artery disease involving native coronary artery of native heart without angina pectoris s/p RCA stent     Cortical cataract of both eyes 03/18/2016    Delayed sleep phase syndrome 03/13/2019    Depression     Erectile dysfunction 03/24/2014    Erectile dysfunction 03/24/2014    Essential hypertension     GERD (gastroesophageal reflux disease) 07/25/2012    Gout, arthritis     Grade III open fracture of  "left tibia and fibula s/p ex-fix on 7/1/16 and ORIF of left tibia on 7/15 07/06/2016    H/O: iritis     Helicobacter pylori (H. pylori) infection     Treated    Hepatitis     Herpes simplex keratoconjunctivitis 09/30/2015    - on acyclovir - followed by opthalmology, Dr. Uribe     Herpes simplex keratoconjunctivitis 09/30/2015    - on acyclovir - followed by opthalmology, Dr. Uribe     Hyperkalemia 02/28/2017    Hyperlipidemia     Hypogonadism male     Hypogonadism male     Immunodeficiency due to drug therapy 07/08/2022    Keloid cicatrix     Lymphoma     HEAD & NECK    Mixed anxiety and depressive disorder     Morbid obesity     Obstructive sleep apnea on CPAP     CPAP    On home oxygen therapy     Osteoarthritis of left knee 07/25/2012    Paroxysmal atrial fibrillation 07/06/2016    Primary osteoarthritis of left knee 07/25/2012    Prominent aorta 01/25/2016    "RESULTS: THE HEART IS MILDLY ENLARGED WITH A SLIGHTLY PROMINENT AORTA" - Xray Chest PA & Lateral 12-     Prostate cancer 02/15/2016    - followed by urology, Dr. Young     Prostate cancer 02/15/2016    - followed by urology, Dr. 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    chest and forehead    Squamous cell cancer of skin of nose     Traumatic type III open fracture of shaft of left tibia and fibula with nonunion 07/06/2016    Type III open fracture of left tibia and fibula with routine healing 07/06/2016    Vitamin D deficiency disease     Vitreous detachment 03/18/2016     Past Surgical History:   Procedure Laterality Date    ADJACENT TISSUE TRANSFER Left 11/30/2020    Procedure: ADJACENT TISSUE TRANSFER x2 ;  " "Surgeon: Teresa Cooper MD;  Location: [facility] 1ST FLR;  Service: Ophthalmology;  Laterality: Left;  Left glabellar 8.5x11x3 and Left upper eyelid advancement flap 88b12s3      Cardiac stenting x2      CATARACT EXTRACTION W/  INTRAOCULAR LENS IMPLANT Right 3/29/2016    Dr. Conteh    CATARACT EXTRACTION W/  INTRAOCULAR LENS IMPLANT Left 4/12/2016        COLONOSCOPY N/A 7/23/2020    Procedure: COLONOSCOPY;  Surgeon: Nico Laceky MD;  Location: [facility] (2ND FLR);  Service: Endoscopy;  Laterality: N/A;    DIRECT LARYNGOSCOPY N/A 5/4/2022    Procedure: LARYNGOSCOPY, DIRECT possible biopsy;  Surgeon: Etta Higgins MD;  Location: [facility] OR;  Service: ENT;  Laterality: N/A;  RN PREOP ON 4/29/22. NEEDS T&S DAY OF SURGERY--NF  CONSENTS DAY OF SURGERY    DISSECTION OF NECK Right 5/4/2022    Procedure: RADICAL NECK DISSECTION;  Surgeon: Etta Higgins MD;  Location: [facility] OR;  Service: ENT;  Laterality: Right;  TO FOLLOW DR ALMAZAN    ESOPHAGOGASTRODUODENOSCOPY N/A 7/23/2020    Procedure: EGD (ESOPHAGOGASTRODUODENOSCOPY);  Surgeon: Nico Lackey MD;  Location: [facility] (2ND FLR);  Service: Endoscopy;  Laterality: N/A;  per Dr Lackey-Will proceed with EGD and colonoscopy on the 2nd floor due to his comorbidities including obesity, sleep apnea, restrictive lung disease, coronary artery disease.  has loop recorder      ok to hold Eliquis 2 days per Dr Sandhu-must remain    EXTERNAL FIXATION TIBIAL FRACTURE Left 07/01/2016    INSERTION OF IMPLANTABLE LOOP RECORDER  04/07/2017    LEFT HEART CATHETERIZATION Left 1/30/2020    Procedure: Left heart cath;  Surgeon: Benjamin Sandhu MD;  Location: [facility] CATH LAB;  Service: Cardiology;  Laterality: Left;  RN PREOP 1/28/20--Pt starting Plavix loading dose today (8pills)- Dr. Sandhu aware.  Pt has a bandaged "non healing area to LLE"--Dr. Sandhu aware.    LEFT HEART CATHETERIZATION Left 2/14/2020    Procedure: Left heart cath, IVUS guided left main " / LAD PCI. Noon start, radial approach;  Surgeon: Miguel Angel Brady MD;  Location: [facility] CATH LAB;  Service: Cardiology;  Laterality: Left;  RN PRE OP 2-7-2020  BMI--45.61    ORIF TIBIA FRACTURE Left 07/15/2016    PROBING OF NASOLACRIMAL DUCT WITH INSERTION OF TUBE Left 11/30/2020    Procedure: PROBING, NASOLACRIMAL DUCT,;  Surgeon: Teresa Cooper MD;  Location: [facility] OR [address] [place] [address];  Service: Ophthalmology;  Laterality: Left;    RADIOACTIVE SEED IMPLANTATION OF PROSTATE N/A 8/8/2018    Procedure: INSERTION, RADIOACTIVE SEED, PROSTATE;  Surgeon: Bipin Thompson MD;  [facility];  Service: Urology;  Laterality: N/A;  1 hour    SKIN BIOPSY      Squamous cell cancer removal x3 with Mohs surgery      TONSILLECTOMY      TOTAL KNEE ARTHROPLASTY  10/2012    TRIGGER FINGER RELEASE Right 10/14/2020    Procedure: RELEASE, TRIGGER FINGER - right;  Surgeon: Rad Swift MD;  Location: [facility];  Service: Orthopedics;  Laterality: Right;    trus/bx      ULTRASOUND GUIDANCE  2/14/2020    Procedure: Ultrasound Guidance;  Surgeon: Miguel Angel Brady MD;  Location: [facility] CATH LAB;  Service: Cardiology;;     Family History       Problem Relation (Age of Onset)    Alzheimer's disease Mother    Cancer Father, Sister, Brother    Hypertension Mother    Lung cancer Father    No Known Problems Sister, Maternal Aunt, Maternal Uncle, Paternal Aunt, Paternal Uncle, Maternal Grandmother, Maternal Grandfather, Paternal Grandmother, Paternal Grandfather    Peripheral vascular disease     Skin cancer Father          Tobacco Use    Smoking status: Never Smoker    Smokeless tobacco: Never Used   Substance and Sexual Activity    Alcohol use: Yes     Alcohol/week: 2.0 standard drinks     Types: 2 Cans of beer per week     Comment: occasionally, beer    Drug use: Never    Sexual activity: Not on file       Review of Systems   Constitutional:  Positive for activity change, appetite change and fatigue.   HENT:  Positive for trouble  swallowing. Negative for mouth sores.    Respiratory:  Positive for shortness of breath (mild bird, chronic ,stable). Negative for cough.    Cardiovascular:  Negative for chest pain.   Gastrointestinal:  Positive for diarrhea, nausea and vomiting. Negative for blood in stool.   Genitourinary:  Negative for hematuria.   Neurological:  Positive for weakness. Negative for syncope.   Objective:     Vital Signs (Most Recent):  Temp: 97.9 °F (36.6 °C) (08/11/22 1629)  Pulse: (!) 112 (08/11/22 1629)  Resp: (!) 24 (08/11/22 1629)  BP: (!) 117/55 (08/11/22 1629)  SpO2: 100 % (08/11/22 1629)   Vital Signs (24h Range):  Temp:  [97.6 °F (36.4 °C)-98.9 °F (37.2 °C)] 97.9 °F (36.6 °C)  Pulse:  [] 112  Resp:  [20-24] 24  SpO2:  [95 %-100 %] 100 %  BP: ()/(44-70) 117/55     Weight: (!) 141.5 kg (312 lb)  Body mass index is 41.16 kg/m².  Body surface area is 2.7 meters squared.      Intake/Output Summary (Last 24 hours) at 8/11/2022 1633  Last data filed at 8/11/2022 0757  Gross per 24 hour   Intake 2550 ml   Output --   Net 2550 ml       Physical Exam    Significant Labs:   All pertinent labs within the past 24 hours have been reviewed     I have reviewed and interpreted all pertinent imaging results/findings within the past 24 hours     Assessment/Plan:     Squamous cell carcinoma of neck   Pt f/b Dr. Hargrove  Pt undergoing chemoradiation with carboplatin  Last carbo 8/1/22  Cont supportive care  Cont IVF hydration  Replete lytes  Plan magic mouthwash for sore throat  Cont current pain regimen with  hycet for mod pain and IV morphine severe pain    Consult nutritionist  Dr. Hargrove will follow-up with pt   Consult palliative care     Sepsis  Defer mgmt to primary team and ID  ID workup reveals UTI   Cont abx   Blood counts do not reveal neutropenia      Thank you for your consult. Will follow with you     Crystal Arias MD  Hematology/Oncology  [facility] Surg

## 2022-08-12 NOTE — HPI
"77 y.o. 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 He reports profuse watery diarrhea x 1 day. He had a couple of episodes of vomiting.Patient states he could "barely get off the toilet." He reports intermittent nausea and episode of vomiting. He has intermittent dysphagia ,odynophagia and loss of taste He reports pain well-controlled with current pain regimen . He is hearing impaired. CXR unremarkable. He is admitted with sepsis. Consulted for head and neck cancer not tolerating treatment.     "

## 2022-08-12 NOTE — HOSPITAL COURSE
76 y/o male with hx of head and neck cancer on chemo treatment presents with progressive weakness.  Noted to be tachycardic and with elevated lactic acid.  Initially admitted with possible sepsis and started on broad spectrum ABx's.  Also with dehydration and started on IVF hydration.  No evidence of infection and ABx's stopped.  Noted to have decreasing H/H.  Recent chemo and Heme/Onc consulted.  Also noted to have increase in Creat.  Creat higher than baseline, but stable and patient with adequate urine output.  Patient complained of loose stool prior to admit.  Minimal bowel movements during hospital stay, but Cdiff PCR collected on admit is positive.  Started on oral Vanc.  Hgb down to 6.8 and patient complaining of severe fatigue.

## 2022-08-12 NOTE — CONSULTS
[facility]  Otorhinolaryngology-Head & Neck Surgery  Consult Note    Patient Name: Janusz Montgomery Jr.  MRN: [id number]  Code Status: Full Code  Admission Date: 8/10/2022  Hospital Length of Stay: 1 days  Attending Physician: Gonzalo Miranda MD  Primary Care Provider: Miguelina Weathers MD    Inpatient consult to ENT  Consult performed by: Etta Higgins MD  Consult ordered by: Gonzalo Mrianda MD  Reason for consult: dysphagia, head and neck cancer   Assessment/Recommendations: Scope exam negative for thrush nor vocal cord dysfunction nor mass.   Dysphagia expected with post CRT, needs aggressive SLP ; appreciate SLP recs and eval and asisstance to get home SLP visits set up  Recommend continuing lotion that was given to him by rad onc  Saline prior to flonase bid for nasal congestion ; can add astelin in the future if need be  Humidify oxygen to prevent nasal dryness and crusting   Has long term pulm dysfunction and deconditioning and has been struggling with this since surgery ; no upper airway issue, recommend defcer to pulm for further workup, tx and recs   Needs outpatient follow up with me  Counseled pt and family that will continue to have CRT side effects over the next month or so ( finished treatment early august)         Subjective:         History of Present Illness: pt known to me- 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 Admitted to hospital and ent consulted for eval for dysphagia and difficulty breathing. Has been having a bit more of an issues with breathing recently. Finds that swallowing issues are intermittent and do improve when he drinks enough water. Having difficulty wearing cpap machine because nose gets congested. Doing flonase but no saline     Medications:  Continuous Infusions:   sodium chloride 0.9% 125 mL/hr at 08/12/22 0933     Scheduled Meds:   amiodarone  200 mg Oral Daily    apixaban  5 mg Oral BID     atorvastatin  40 mg Oral QHS    clopidogreL  75 mg Oral Daily    gabapentin  100 mg Oral BID    magic mouthwash (diphenhydrAMINE 12.5 mg/5 mL 20 mL, aluminum & magnesium hydroxide-simethicone (MYLANTA) 20 mL, LIDOcaine HCl 2% (XYLOCAINE) 20 mL) solution  15 mL Swish & Spit Q6H WAKE    melatonin  9 mg Oral Nightly    metoprolol succinate  25 mg Oral Daily    ondansetron  8 mg Intravenous Q8H     PRN Meds:acetaminophen, albuterol-ipratropium, dextrose 10%, dextrose 10%, glucagon (human recombinant), glucose, glucose, hydrocodone-apap 7.5-325 MG/15 ML, morphine, sodium chloride 0.9%     No current facility-administered medications on file prior to encounter.     Current Outpatient Medications on File Prior to Encounter   Medication Sig    acyclovir (ZOVIRAX) 800 MG Tab Take 200 mg by mouth 2 (two) times daily.    amiodarone (PACERONE) 200 MG Tab Take 1 tablet (200 mg total) by mouth once daily.    artificial tears (ISOPTO TEARS) 0.5 % ophthalmic solution Place 1 drop into both eyes as needed.    atorvastatin (LIPITOR) 40 MG tablet Take 1 tablet (40 mg total) by mouth once daily. (Patient taking differently: Take 40 mg by mouth every evening.)    clopidogreL (PLAVIX) 75 mg tablet Take 1 tablet by mouth once daily (Patient taking differently: Take by mouth once daily.)    dexAMETHasone (DECADRON) 4 MG Tab 4mg (1 tablet) by mouth twice a day for two days starting the day after chemo.  Repeat after each dose of chemo.    diphenhydrAMINE-aluminum-magnesium hydroxide-simethicone-LIDOcaine HCl 2% Swish and spit 15 mLs every 4 (four) hours as needed.    ELIQUIS 5 mg Tab Take 1 tablet by mouth twice daily    furosemide (LASIX) 20 MG tablet Take 1 tablet (20 mg total) by mouth once daily. (Patient taking differently: Take 20 mg by mouth every morning.)    gabapentin (NEURONTIN) 100 MG capsule 2 capsules once daily    gabapentin (NEURONTIN) 300 MG capsule Take 1 capsule (300 mg total) by mouth 3 (three) times  daily.    gly-carb h-poly a-pot hydrox (MUGARD) Soln by Mucous Membrane route.    hydrocodone-acetaminophen (HYCET) solution 7.5-325 mg/15mL Take 15 mLs by mouth every 8 (eight) hours as needed for Pain.    LIDOcaine-prilocaine (EMLA) cream Apply generously to port site 30-60 min prior to chemo and then cover with saran wrap.    melatonin 5 mg Tab Take 10 mg by mouth nightly.    metoprolol succinate (TOPROL-XL) 25 MG 24 hr tablet Take 1 tablet by mouth once daily (Patient taking differently: Take by mouth every morning.)    nitroGLYCERIN (NITROSTAT) 0.4 MG SL tablet Place 1 tablet (0.4 mg total) under the tongue every 5 (five) minutes as needed for Chest pain.    ondansetron (ZOFRAN) 8 MG tablet Take 1 tablet (8 mg total) by mouth every 8 (eight) hours as needed for Nausea.    oxybutynin (DITROPAN-XL) 5 MG TR24 Take 1 tablet (5 mg total) by mouth once daily. (Patient taking differently: Take 5 mg by mouth every evening.)    pantoprazole (PROTONIX) 40 MG tablet Take 1 tablet (40 mg total) by mouth once daily.    phenylephrine/DM/acetaminop/GG (MUCINEX FAST-MAX SEVERE COLD ORAL) Take by mouth.    sildenafiL (VIAGRA) 100 MG tablet Take 1 tablet (100 mg total) by mouth daily as needed.    silver sulfADIAZINE 1% (SILVADENE) 1 % cream Apply topically.    sucralfate (CARAFATE) 100 mg/mL suspension     tamsulosin (FLOMAX) 0.4 mg Cap Take 1 capsule (0.4 mg total) by mouth every evening.    traZODone (DESYREL) 50 MG tablet Take 1 tablet (50 mg total) by mouth nightly as needed for Insomnia. (Patient not taking: Reported on 7/19/2022)    venlafaxine (EFFEXOR) 75 MG tablet Take 2 tablets (150 mg total) by mouth 2 (two) times daily. (Patient taking differently: Take 75 mg by mouth every morning.)    vitamin D 1000 units Tab Take 1 tablet (1,000 Units total) by mouth once daily. (Patient taking differently: Take 2,000 Units by mouth once daily.)    [DISCONTINUED] albuterol (VENTOLIN HFA) 90 mcg/actuation inhaler  Inhale 2 puffs into the lungs every 6 (six) hours as needed for Wheezing. Rescue    [DISCONTINUED] omeprazole (PRILOSEC) 20 MG capsule Take 1 capsule (20 mg total) by mouth daily as needed.       Review of patient's allergies indicates:   Allergen Reactions    Ciprofloxacin Rash     Diffuse pruritic morbilliform rash developed 3/15/2017 after dose of cipro; previously in 2/2017 he had rash/fevers after initiation of cipro    Zosyn [piperacillin-tazobactam] Rash     Diffuse pruritic morbilliform rash developed 3/15/2017.  Then, 430am dose on 3/16 and rash worsened with SOB/tachypnea but no hypoxemia.     Bacitracin Itching and Rash     Violaceous rash in area of topical Tx.        Past Medical History:   Diagnosis Date    (HFpEF) heart failure with preserved ejection fraction 02/04/2022    NAT (acute kidney injury) 03/19/2017    ALLERGIC RHINITIS     Anemia     Anxiety     Basal cell carcinoma 10/19/2018    forehead and right medial shoulder    Basal cell carcinoma 01/09/2019    left nasal bridge and left posterior ear    Basal cell carcinoma 06/12/2020    left lower medial leg - Efudex    Basal cell carcinoma 11/30/2020    left medial upper eyelid canthus superficial BCC    Chronic rhinitis 05/03/2013    Chronic rhinitis 05/03/2013    Coronary artery disease involving native coronary artery of native heart without angina pectoris s/p RCA stent     Cortical cataract of both eyes 03/18/2016    Delayed sleep phase syndrome 03/13/2019    Depression     Erectile dysfunction 03/24/2014    Erectile dysfunction 03/24/2014    Essential hypertension     GERD (gastroesophageal reflux disease) 07/25/2012    Gout, arthritis     Grade III open fracture of left tibia and fibula s/p ex-fix on 7/1/16 and ORIF of left tibia on 7/15 07/06/2016    H/O: iritis     Helicobacter pylori (H. pylori) infection     Treated    Hepatitis     Herpes simplex keratoconjunctivitis 09/30/2015    - on acyclovir - followed  "by opthalmology, Dr. Uribe     Herpes simplex keratoconjunctivitis 09/30/2015    - on acyclovir - followed by opthalmology, Dr. Uribe     Hyperkalemia 02/28/2017    Hyperlipidemia     Hypogonadism male     Hypogonadism male     Immunodeficiency due to drug therapy 07/08/2022    Keloid cicatrix     Lymphoma     HEAD & NECK    Mixed anxiety and depressive disorder     Morbid obesity     Obstructive sleep apnea on CPAP     CPAP    On home oxygen therapy     Osteoarthritis of left knee 07/25/2012    Paroxysmal atrial fibrillation 07/06/2016    Primary osteoarthritis of left knee 07/25/2012    Prominent aorta 01/25/2016    "RESULTS: THE HEART IS MILDLY ENLARGED WITH A SLIGHTLY PROMINENT AORTA" - Xray Chest PA & Lateral 12-     Prostate cancer 02/15/2016    - followed by urology, Dr. Young     Prostate cancer 02/15/2016    - followed by urology, Dr. 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    chest and forehead    Squamous cell cancer of skin of nose     Traumatic type III open fracture of shaft of left tibia and fibula with nonunion 07/06/2016    Type III open fracture of left tibia and fibula with routine healing 07/06/2016    Vitamin D deficiency disease     Vitreous detachment 03/18/2016     Past Surgical History:   Procedure Laterality Date    ADJACENT TISSUE TRANSFER Left 11/30/2020    Procedure: ADJACENT TISSUE TRANSFER x2 ;  Surgeon: Teresa Cooper MD;  Location: [facility] OR [address] [place] [address];  Service: Ophthalmology;  Laterality: Left;  Left glabellar 8.5x11x3 and Left upper eyelid advancement flap [id number]      Cardiac stenting x2      CATARACT EXTRACTION W/  INTRAOCULAR LENS IMPLANT " "Right 3/29/2016    Dr. Conteh    CATARACT EXTRACTION W/  INTRAOCULAR LENS IMPLANT Left 4/12/2016        COLONOSCOPY N/A 7/23/2020    Procedure: COLONOSCOPY;  Surgeon: Nico Lackey MD;  Location: [facility] (2ND FLR);  Service: Endoscopy;  Laterality: N/A;    DIRECT LARYNGOSCOPY N/A 5/4/2022    Procedure: LARYNGOSCOPY, DIRECT possible biopsy;  Surgeon: Etta Higgins MD;  Location: [facility] OR;  Service: ENT;  Laterality: N/A;  RN PREOP ON 4/29/22. NEEDS T&S DAY OF SURGERY--NF  CONSENTS DAY OF SURGERY    DISSECTION OF NECK Right 5/4/2022    Procedure: RADICAL NECK DISSECTION;  Surgeon: Etta Higgins MD;  Location: [facility] OR;  Service: ENT;  Laterality: Right;  TO FOLLOW DR ALMAZAN    ESOPHAGOGASTRODUODENOSCOPY N/A 7/23/2020    Procedure: EGD (ESOPHAGOGASTRODUODENOSCOPY);  Surgeon: Nico Lackey MD;  Location: [facility] ([address] [place] [address]);  Service: Endoscopy;  Laterality: N/A;  per Dr Lackey-Will proceed with EGD and colonoscopy on the 2nd floor due to his comorbidities including obesity, sleep apnea, restrictive lung disease, coronary artery disease.  has loop recorder      ok to hold Eliquis 2 days per Dr Sandhu-must remain    EXTERNAL FIXATION TIBIAL FRACTURE Left 07/01/2016    INSERTION OF IMPLANTABLE LOOP RECORDER  04/07/2017    LEFT HEART CATHETERIZATION Left 1/30/2020    Procedure: Left heart cath;  Surgeon: Benjamin Sandhu MD;  Location: [facility] CATH LAB;  Service: Cardiology;  Laterality: Left;  RN PREOP 1/28/20--Pt starting Plavix loading dose today (8pills)- Dr. Sandhu aware.  Pt has a bandaged "non healing area to LLE"--Dr. Sandhu aware.    LEFT HEART CATHETERIZATION Left 2/14/2020    Procedure: Left heart cath, IVUS guided left main / LAD PCI. Noon start, radial approach;  Surgeon: Miguel Angel Brady MD;  Location: [facility] CATH LAB;  Service: Cardiology;  Laterality: Left;  RN PRE OP 2-7-2020  BMI--45.61    ORIF TIBIA FRACTURE Left 07/15/2016    PROBING OF NASOLACRIMAL DUCT WITH " INSERTION OF TUBE Left 11/30/2020    Procedure: PROBING, NASOLACRIMAL DUCT,;  Surgeon: Teresa Cooper MD;  Location: [facility] OR [address] [place] [address];  Service: Ophthalmology;  Laterality: Left;    RADIOACTIVE SEED IMPLANTATION OF PROSTATE N/A 8/8/2018    Procedure: INSERTION, RADIOACTIVE SEED, PROSTATE;  Surgeon: Bipin Thompson MD;  [facility];  Service: Urology;  Laterality: N/A;  1 hour    SKIN BIOPSY      Squamous cell cancer removal x3 with Mohs surgery      TONSILLECTOMY      TOTAL KNEE ARTHROPLASTY  10/2012    TRIGGER FINGER RELEASE Right 10/14/2020    Procedure: RELEASE, TRIGGER FINGER - right;  Surgeon: Rad Swift MD;  Location: [facility] OR;  Service: Orthopedics;  Laterality: Right;    trus/bx      ULTRASOUND GUIDANCE  2/14/2020    Procedure: Ultrasound Guidance;  Surgeon: Miguel Angel Brady MD;  Location: [facility] CATH LAB;  Service: Cardiology;;     Family History     Problem Relation (Age of Onset)    Alzheimer's disease Mother    Cancer Father, Sister, Brother    Hypertension Mother    Lung cancer Father    No Known Problems Sister, Maternal Aunt, Maternal Uncle, Paternal Aunt, Paternal Uncle, Maternal Grandmother, Maternal Grandfather, Paternal Grandmother, Paternal Grandfather    Peripheral vascular disease     Skin cancer Father        Tobacco Use    Smoking status: Never Smoker    Smokeless tobacco: Never Used   Substance and Sexual Activity    Alcohol use: Yes     Alcohol/week: 2.0 standard drinks     Types: 2 Cans of beer per week     Comment: occasionally, beer    Drug use: Never    Sexual activity: Not on file     Review of Systems   HENT: Positive for trouble swallowing. Negative for sore throat and voice change.    Respiratory: Positive for shortness of breath.    Cardiovascular: Positive for leg swelling.   Skin: Positive for color change and wound.   Allergic/Immunologic: Positive for environmental allergies.     Objective:     Vital Signs (Most Recent):  Temp: 97.7 °F (36.5 °C)  (08/12/22 1130)  Pulse: 78 (08/12/22 1130)  Resp: 20 (08/12/22 1130)  BP: (!) 109/57 (08/12/22 1130)  SpO2: 96 % (08/12/22 1130) Vital Signs (24h Range):  Temp:  [97.7 °F (36.5 °C)-98.1 °F (36.7 °C)] 97.7 °F (36.5 °C)  Pulse:  [] 78  Resp:  [20-24] 20  SpO2:  [93 %-100 %] 96 %  BP: (109-152)/(47-63) 109/57     Weight: (!) 141.5 kg (312 lb)  Body mass index is 41.16 kg/m².    Date 08/12/22 0700 - 08/13/22 0659   Shift 4680-1931 0040-8077 9748-5675 24 Hour Total   INTAKE   P.O. 120   120   Shift Total(mL/kg) 120(0.8)   120(0.8)   OUTPUT   Urine(mL/kg/hr) 600   600   Shift Total(mL/kg) 600(4.2)   600(4.2)   Weight (kg) 141.5 141.5 141.5 141.5       Physical Exam  Constitutional:       Appearance: He is obese.   HENT:      Nose: Nose normal.      Mouth/Throat:      Mouth: Mucous membranes are dry.      Comments: No thrush  Neck:      Trachea: Trachea normal.     Pulmonary:      Comments: Voice normal   Neurological:      Mental Status: He is alert.       PROCEDURE NOTE  NAME OF PROCEDURE: Flexible Laryngoscopy, diagnostic  INDICATIONS: gag reflex precludes mirror exam,dysphagia, trouble breathing  FINDINGS: no mass, post rt changes , dried blood in left nasal cavity     Consent: After procedure was explained in detail and all questions answered, verbal consent was obtained for performing flexible laryngoscopy.  Anesthesia: topical 4% lidocaine and neosynephrine  Procedure: With patient in seated position, the scope was inserted into the bilateral nasal passageway and advanced atraumatically into the nasopharynx to examine the following structures:  Nasal cavity: Turbinates with hypertrophy.  No purulent drainage. Dried blood in left nasal cavity   Nasopharynx: no mass or lesion noted in nasopharynx.   Oropharynx: base of tongue without  mass or ulceration. Lingual tonsils normal in appearance  Hypopharynx: posterior pharyngeal wall without mass or lesion. No pooling of secretions. Pyriform sinuses visible without  mass or lesion  Larynx: epiglottis normal without lesion. False vocal folds without edema/erythema/lesion. True vocal folds mobile and without lesion. Mild interarytenoid edema no erythema . Postcricoid region with mild edema no lesion   Subglottis: visualized portion of subglottis normal in appearance    After examination performed, the scope was removed atraumatically . The patient tolerated the procedure well.   Significant Labs:  CBC:   Recent Labs   Lab 08/12/22  0529   WBC 4.81   RBC 1.92*   HGB 6.9*   HCT 20.5*   PLT 72*   *   MCH 35.9*   MCHC 33.7     CMP:   Recent Labs   Lab 08/12/22  0529   *   CALCIUM 8.3*   ALBUMIN 2.5*   PROT 5.0*      K 4.0   CO2 30*      BUN 18   CREATININE 2.3*   ALKPHOS 101   ALT 19   AST 20   BILITOT 1.2*         Assessment/Plan:     Active Diagnoses:    Diagnosis Date Noted POA    PRINCIPAL PROBLEM:  Sepsis [A41.9] 03/16/2017 Yes    Abnormal urinalysis [R82.90] 08/11/2022 Yes    Acute respiratory insufficiency [R06.89] 08/11/2022 Yes    Squamous cell carcinoma of neck [C44.42] 08/11/2022 Unknown    Weakness [R53.1]  Unknown    Urinary tract infection without hematuria [N39.0]  Unknown    Stage 3a chronic kidney disease [N18.31] 08/24/2020 Yes    Paroxysmal atrial fibrillation  [I48.0] 07/06/2016 Yes    CHARISMA on CPAP [G47.33, Z99.89]  Not Applicable    Coronary artery disease involving native coronary artery of native heart without angina pectoris s/p RCA stent [I25.10]  Yes      Problems Resolved During this Admission:     VTE Risk Mitigation (From admission, onward)         Ordered     apixaban tablet 5 mg  2 times daily         08/11/22 0409     IP VTE HIGH RISK PATIENT  Once         08/11/22 0409     Place sequential compression device  Until discontinued         08/11/22 0409                Thank you for your consult. I will sign off. Please contact us if you have any additional questions.    Etta Higgins MD  Otorhinolaryngology-Head &  Neck Surgery  [facility]

## 2022-08-12 NOTE — SUBJECTIVE & OBJECTIVE
Interval History: feeling better.    Review of Systems   HENT:  Negative for ear discharge and ear pain.    Eyes:  Negative for discharge and itching.   Endocrine: Negative for cold intolerance and heat intolerance.   Neurological:  Negative for seizures and syncope.   Objective:     Vital Signs (Most Recent):  Temp: 97.7 °F (36.5 °C) (08/12/22 1130)  Pulse: 78 (08/12/22 1130)  Resp: 20 (08/12/22 1130)  BP: (!) 109/57 (08/12/22 1130)  SpO2: 96 % (08/12/22 1130)   Vital Signs (24h Range):  Temp:  [97.7 °F (36.5 °C)-98.1 °F (36.7 °C)] 97.7 °F (36.5 °C)  Pulse:  [] 78  Resp:  [20-24] 20  SpO2:  [93 %-100 %] 96 %  BP: (109-152)/(47-63) 109/57     Weight: (!) 141.5 kg (312 lb)  Body mass index is 41.16 kg/m².    Intake/Output Summary (Last 24 hours) at 8/12/2022 1559  Last data filed at 8/12/2022 0821  Gross per 24 hour   Intake 2471.6 ml   Output 1600 ml   Net 871.6 ml      Physical Exam  Constitutional:       General: He is not in acute distress.     Appearance: He is not diaphoretic.   HENT:      Head: Normocephalic and atraumatic.      Mouth/Throat:      Mouth: Mucous membranes are dry.      Pharynx: No oropharyngeal exudate or posterior oropharyngeal erythema.   Cardiovascular:      Rate and Rhythm: Normal rate and regular rhythm.   Pulmonary:      Comments: Slight tachypnea.  Slight bilateral expiratory wheezing.  Abdominal:      General: Bowel sounds are normal.      Palpations: Abdomen is soft.   Musculoskeletal:      Right lower leg: Edema present.      Left lower leg: Edema present.   Skin:     Comments: Dry, scaly, erythema around neckline.  Erythema to bilateral lower extremities.  No warmth.   Neurological:      Mental Status: He is oriented to person, place, and time.      Cranial Nerves: No cranial nerve deficit.       Significant Labs: All pertinent labs within the past 24 hours have been reviewed.  BMP:   Recent Labs   Lab 08/12/22  0529   *      K 4.0      CO2 30*   BUN 18    CREATININE 2.3*   CALCIUM 8.3*   MG 1.8     CBC:   Recent Labs   Lab 08/10/22  2244 08/12/22  0529 08/12/22  1312   WBC 4.27 4.81 5.87   HGB 9.2* 6.9* 7.0*   HCT 26.4* 20.5* 20.5*   PLT 94* 72* 81*       Significant Imaging: I have reviewed all pertinent imaging results/findings within the past 24 hours.

## 2022-08-12 NOTE — ASSESSMENT & PLAN NOTE
Patient with decreasing oral intake and worsening generalized weakness.  Concerned about possible deterioration of functional and nutrition status and presentation related to cancer +/- treatment.  Heme/Onc consulted.

## 2022-08-12 NOTE — ASSESSMENT & PLAN NOTE
Baseline Creat around 1.5.  Presents with Creat of 1.7, increased to 2.3 today.  Continue IVF hydration.  Avoid nephrotoxic medications.  Repeat labs in Am.  Closely monitor renal function.  Nephrology consult in Am if Creat higher.

## 2022-08-12 NOTE — PLAN OF CARE
Problem: Fall Injury Risk  Goal: Absence of Fall and Fall-Related Injury  8/12/2022 1744 by Alise Smallwood RN  Outcome: Ongoing, Progressing  8/12/2022 1744 by Alise Smallwood RN  Outcome: Ongoing, Progressing     Problem: Skin Injury Risk Increased  Goal: Skin Health and Integrity  8/12/2022 1744 by Alise Smallwood RN  Outcome: Ongoing, Progressing  8/12/2022 1744 by Alise Smallwood RN  Outcome: Ongoing, Progressing     Problem: Infection  Goal: Absence of Infection Signs and Symptoms  Outcome: Ongoing, Progressing

## 2022-08-12 NOTE — ASSESSMENT & PLAN NOTE
Secondary to chemoradiation.  Decline in nutrition may also be contributing  -monior cbc daily  --transfuse for Hg <7 and plt <10 or uncontrolled bleeding.  -scheduled zofran to help with nausea and improve caloric intake

## 2022-08-12 NOTE — ASSESSMENT & PLAN NOTE
S/p chemoradiation.  Cont supportive care  Cont IVF hydration  Replete lytes  Scheduled magic mouth wash for sore throat  Cont current pain regimen with  hycet for mod pain and IV morphine severe pain

## 2022-08-12 NOTE — ASSESSMENT & PLAN NOTE
Patient presented with tachycardia and elevated lactic acid.  Empirically started on ABx's.  Abnormal UA, but no urinary symptoms.  All cultures negative so far.  Stop all ABx's.  Continue on IVF hydration.  Pending Cdiff PCR, but patient with just one bowel movement today.  Afebrile

## 2022-08-12 NOTE — ASSESSMENT & PLAN NOTE
Patient with Paroxysmal (<7 days) atrial fibrillation which is controlled currently with Amiodarone. Patient is currently in sinus rhythm.[id number] Score: 3. HASBLED Score: Unable to calculate. Anticoagulation indicated. Anticoagulation done with Eliquis.

## 2022-08-12 NOTE — PLAN OF CARE
[place] [facility]  Initial Discharge Assessment    Patient from home and lives with spouse, who will be his help at home. Pt currently has cpap, rw and home oxygen. Pt stated he would benefit from home health and wheelchair at discharge if appropriate. Pt request transportation assistance with [facility] van upon discharge. [place] to continue to follow for discharge needs.        Primary Care Provider: Miguelina Weathers MD    Admission Diagnosis: Weakness [R53.1]    Admission Date: 8/10/2022  Expected Discharge Date:     Discharge Barriers Identified: None    Payor: [employer] MEDICARE / Plan: [employer] MEDICARE HMO / Product Type: Capitation /     Extended Emergency Contact Information  Primary Emergency Contact: Bri Montgomery  Address: [address] [place] [address] [facility]  Home Phone: [phone]  Mobile Phone: [phone]  Relation: Spouse  Secondary Emergency Contact: Angie Ortiz  Mobile Phone: [phone]  Relation: Daughter  Preferred language: English   needed? No    Discharge Plan A: Home with family  Discharge Plan B: Home Health (If appropriate at discharge)      [place] [id number]  [place] [facility] [address] [facility]  [address] [facility]  [place] [address]  Phone: [phone] Fax: [phone]    [facility] [address]  [address] [place] [address]  Phone: [phone] Fax: [phone]      Initial Assessment (most recent)     Adult Discharge Assessment - 08/12/22 1434        Discharge Assessment    Assessment Type Discharge Planning Assessment     Confirmed/corrected address, phone number and insurance Yes     Confirmed Demographics Correct on Facesheet     Source of Information patient;family;health record     When was your last doctors appointment? --   last month    Does patient/caregiver understand observation status No     Was observation education provided? No     Communicated KIARA with patient/caregiver Date not  available/Unable to determine     Reason For Admission Sepsis     Lives With spouse     Facility Arrived From: Home     Do you expect to return to your current living situation? Yes     Do you have help at home or someone to help you manage your care at home? Yes     Who are your caregiver(s) and their phone number(s)? Bri Montgomery (Spouse)   [phone]     Prior to hospitilization cognitive status: Alert/Oriented     Current cognitive status: Alert/Oriented     Walking or Climbing Stairs Difficulty ambulation difficulty, requires equipment;ambulation difficulty, assistance 1 person;transferring difficulty, requires equipment;transferring difficulty, assistance 1 person     Dressing/Bathing Difficulty bathing difficulty, requires equipment;bathing difficulty, assistance 1 person     Equipment Currently Used at Home bedside commode;bath bench;cane, straight;walker, rolling;oxygen;CPAP     Readmission within 30 days? No     Patient currently being followed by outpatient case management? No     Do you currently have service(s) that help you manage your care at home? No     Do you take prescription medications? Yes     Do you have prescription coverage? Yes     Coverage [employer] Managed medicare     Do you have any problems affording any of your prescribed medications? No     Is the patient taking medications as prescribed? yes     Who is going to help you get home at discharge? Bri Montgomery (Spouse)   [phone]     How do you get to doctors appointments? health plan transportation;public transportation     Are you on dialysis? No     Do you take coumadin? No     Discharge Plan A Home with family     Discharge Plan B Home Health   If appropriate at discharge    DME Needed Upon Discharge  other (see comments)   TBD    Discharge Plan discussed with: Spouse/sig other;Patient     Name(s) and Number(s) Bri Montgomery (Spouse)   [phone]     Discharge Barriers Identified None        Relationship/Environment    Name(s) of  Who Lives With Patient Bri Montgomery (Spouse)   [phone]

## 2022-08-13 NOTE — SUBJECTIVE & OBJECTIVE
Interval History: no new complaints.    Review of Systems   HENT:  Negative for ear discharge and ear pain.    Eyes:  Negative for discharge and itching.   Endocrine: Negative for cold intolerance and heat intolerance.   Neurological:  Negative for seizures and syncope.   Objective:     Vital Signs (Most Recent):  Temp: 98.2 °F (36.8 °C) (08/13/22 1158)  Pulse: 78 (08/13/22 1158)  Resp: 20 (08/13/22 1158)  BP: (!) 143/63 (08/13/22 1158)  SpO2: 100 % (08/13/22 1158)   Vital Signs (24h Range):  Temp:  [97.9 °F (36.6 °C)-98.3 °F (36.8 °C)] 98.2 °F (36.8 °C)  Pulse:  [71-82] 78  Resp:  [20-24] 20  SpO2:  [91 %-100 %] 100 %  BP: (119-192)/(56-82) 143/63     Weight: (!) 146.7 kg (323 lb 6.6 oz)  Body mass index is 42.67 kg/m².    Intake/Output Summary (Last 24 hours) at 8/13/2022 1337  Last data filed at 8/13/2022 0500  Gross per 24 hour   Intake 3067.38 ml   Output 925 ml   Net 2142.38 ml        Physical Exam  Constitutional:       General: He is not in acute distress.     Appearance: He is not diaphoretic.   HENT:      Head: Normocephalic and atraumatic.      Mouth/Throat:      Mouth: Mucous membranes are dry.      Pharynx: No oropharyngeal exudate or posterior oropharyngeal erythema.   Cardiovascular:      Rate and Rhythm: Normal rate and regular rhythm.   Pulmonary:      Comments: Slight tachypnea.  Slight bilateral expiratory wheezing.  Abdominal:      General: Bowel sounds are normal.      Palpations: Abdomen is soft.   Musculoskeletal:      Right lower leg: Edema present.      Left lower leg: Edema present.   Skin:     Comments: Scaly erythema around neckline.  Erythema to bilateral lower extremities.  No warmth.   Neurological:      Mental Status: He is oriented to person, place, and time.      Cranial Nerves: No cranial nerve deficit.       Significant Labs: All pertinent labs within the past 24 hours have been reviewed.  BMP:   Recent Labs   Lab 08/13/22  0331         K 3.7      CO2 30*   BUN  17   CREATININE 2.4*   CALCIUM 8.6*   MG 1.8       CBC:   Recent Labs   Lab 08/12/22  0529 08/12/22  1312 08/13/22  0331   WBC 4.81 5.87 5.38   HGB 6.9* 7.0* 7.2*   HCT 20.5* 20.5* 21.4*   PLT 72* 81* 96*         Significant Imaging: I have reviewed all pertinent imaging results/findings within the past 24 hours.

## 2022-08-13 NOTE — ASSESSMENT & PLAN NOTE
Baseline Creat around 1.5.  With ARF  Avoid nephrotoxic medications.  Closely monitor renal function.  Creat of 2.4.  Patient with adequate urine output.  Will continue to closely monitor.

## 2022-08-13 NOTE — NURSING
Pt had episode of lavern down to 38-40, was noted in bed by charge nurse sleeping, aroused pt, asymptomatic, now 72 NSR, Dr. Miranda notified, awaiting response

## 2022-08-13 NOTE — ASSESSMENT & PLAN NOTE
Patient with decreasing oral intake and worsening generalized weakness.  Concerned about possible deterioration of functional and nutrition status and presentation related to cancer +/- treatment.  Appreciate Heme/Onc input.

## 2022-08-13 NOTE — PROGRESS NOTES
[facility]  Progress Note    Patient Name: Janusz Montgomery Jr.  MRN: [id number]  Patient Class: IP- Inpatient   Admission Date: 8/10/2022  Length of Stay: 2 days  Attending Physician: Gonzalo Miranda MD  Primary Care Provider: Miguelina Weathers MD        Subjective:     Principal Problem:Sepsis        HPI:  No notes on file    Overview/Hospital Course:  76 y/o male with hx of head and neck cancer on chemo treatment presents with progressive weakness.  Noted to be tachycardic and with elevated lactic acid.  Initially admitted with possible sepsis and started on broad spectrum ABx's.  Also with dehydration and started on IVF hydration.  No evidence of infection and ABx's stopped.  Noted to have decreasing H/H.  Recent chemo and Heme/Onc consulted.  Also noted to have increase in Creat.      Interval History: no new complaints.    Review of Systems   HENT:  Negative for ear discharge and ear pain.    Eyes:  Negative for discharge and itching.   Endocrine: Negative for cold intolerance and heat intolerance.   Neurological:  Negative for seizures and syncope.   Objective:     Vital Signs (Most Recent):  Temp: 98.2 °F (36.8 °C) (08/13/22 1158)  Pulse: 78 (08/13/22 1158)  Resp: 20 (08/13/22 1158)  BP: (!) 143/63 (08/13/22 1158)  SpO2: 100 % (08/13/22 1158)   Vital Signs (24h Range):  Temp:  [97.9 °F (36.6 °C)-98.3 °F (36.8 °C)] 98.2 °F (36.8 °C)  Pulse:  [71-82] 78  Resp:  [20-24] 20  SpO2:  [91 %-100 %] 100 %  BP: (119-192)/(56-82) 143/63     Weight: (!) 146.7 kg (323 lb 6.6 oz)  Body mass index is 42.67 kg/m².    Intake/Output Summary (Last 24 hours) at 8/13/2022 1337  Last data filed at 8/13/2022 0500  Gross per 24 hour   Intake 3067.38 ml   Output 925 ml   Net 2142.38 ml        Physical Exam  Constitutional:       General: He is not in acute distress.     Appearance: He is not diaphoretic.   HENT:      Head: Normocephalic and atraumatic.      Mouth/Throat:      Mouth: Mucous membranes are dry.       Pharynx: No oropharyngeal exudate or posterior oropharyngeal erythema.   Cardiovascular:      Rate and Rhythm: Normal rate and regular rhythm.   Pulmonary:      Comments: Slight tachypnea.  Slight bilateral expiratory wheezing.  Abdominal:      General: Bowel sounds are normal.      Palpations: Abdomen is soft.   Musculoskeletal:      Right lower leg: Edema present.      Left lower leg: Edema present.   Skin:     Comments: Scaly erythema around neckline.  Erythema to bilateral lower extremities.  No warmth.   Neurological:      Mental Status: He is oriented to person, place, and time.      Cranial Nerves: No cranial nerve deficit.       Significant Labs: All pertinent labs within the past 24 hours have been reviewed.  BMP:   Recent Labs   Lab 08/13/22  0331         K 3.7      CO2 30*   BUN 17   CREATININE 2.4*   CALCIUM 8.6*   MG 1.8       CBC:   Recent Labs   Lab 08/12/22  0529 08/12/22  1312 08/13/22  0331   WBC 4.81 5.87 5.38   HGB 6.9* 7.0* 7.2*   HCT 20.5* 20.5* 21.4*   PLT 72* 81* 96*         Significant Imaging: I have reviewed all pertinent imaging results/findings within the past 24 hours.      Assessment/Plan:      * Sepsis  Patient presented with tachycardia and elevated lactic acid.  Empirically started on ABx's.  Abnormal UA, but no urinary symptoms.  All cultures negative so far.  Stopped all ABx's.   Pending Cdiff PCR, but patient with no BM in 24 hours.  Afebrile     Pancytopenia  Noted drop in H/H and Platelets.  Could be related to hemodilution.  Anemia and thrombocytopenia secondary to chemotherapy.  No evidence of bleeding.  H/H low, but stable.  Hold off on transfusion for now.      Weakness  PT/OT evaluation.      Squamous cell carcinoma of neck  Patient with decreasing oral intake and worsening generalized weakness.  Concerned about possible deterioration of functional and nutrition status and presentation related to cancer +/- treatment.  Appreciate Heme/Onc  input.      Acute respiratory insufficiency  Patient with chronic hypoxemic respiratory failure on home oxygen.  Seems to be at baseline.    Stage 3a chronic kidney disease  Baseline Creat around 1.5.  With ARF  Avoid nephrotoxic medications.  Closely monitor renal function.  Creat of 2.4.  Patient with adequate urine output.  Will continue to closely monitor.    Paroxysmal atrial fibrillation   Patient with Paroxysmal (<7 days) atrial fibrillation which is controlled currently with Amiodarone. Patient is currently in sinus rhythm.[id number] Score: 3. HASBLED Score: Unable to calculate. Anticoagulation indicated. Anticoagulation done with Eliquis.        CHARISMA on CPAP  Nightly CPAP      Coronary artery disease involving native coronary artery of native heart without angina pectoris s/p RCA stent  Chest pain free.  Continue Plavix.      VTE Risk Mitigation (From admission, onward)         Ordered     apixaban tablet 5 mg  2 times daily         08/11/22 0409     IP VTE HIGH RISK PATIENT  Once         08/11/22 0409     Place sequential compression device  Until discontinued         08/11/22 0409                Discharge Planning   KIARA:      Code Status: Full Code   Is the patient medically ready for discharge?:     Reason for patient still in hospital (select all that apply): Patient trending condition  Discharge Plan A: Home with family                  Gonzalo Miranda MD  Department of Hospital Medicine   [facility] [place] [facility]

## 2022-08-13 NOTE — ASSESSMENT & PLAN NOTE
Patient with Paroxysmal (<7 days) atrial fibrillation which is controlled currently with Amiodarone. Patient is currently in sinus rhythm.[id number] Score: 3. HASBLED Score: Unable to calculate. Anticoagulation indicated. Anticoagulation done with Eliquis.

## 2022-08-13 NOTE — PLAN OF CARE
Pt remained free from falls and injury throughout shift. Denies any pain. Pt with SOB on exertion and wheezing, resp tx changed to Xopenex to decrease chances of tachycardia. Awaiting cdif PCR.   Problem: Adult Inpatient Plan of Care  Goal: Plan of Care Review  Outcome: Ongoing, Progressing  Goal: Patient-Specific Goal (Individualized)  Outcome: Ongoing, Progressing  Goal: Absence of Hospital-Acquired Illness or Injury  Outcome: Ongoing, Progressing  Goal: Optimal Comfort and Wellbeing  Outcome: Ongoing, Progressing  Goal: Readiness for Transition of Care  Outcome: Ongoing, Progressing     Problem: Bariatric Environmental Safety  Goal: Safety Maintained with Care  Outcome: Ongoing, Progressing     Problem: Fall Injury Risk  Goal: Absence of Fall and Fall-Related Injury  Outcome: Ongoing, Progressing     Problem: Skin Injury Risk Increased  Goal: Skin Health and Integrity  Outcome: Ongoing, Progressing     Problem: Adjustment to Illness (Sepsis/Septic Shock)  Goal: Optimal Coping  Outcome: Ongoing, Progressing     Problem: Bleeding (Sepsis/Septic Shock)  Goal: Absence of Bleeding  Outcome: Ongoing, Progressing     Problem: Glycemic Control Impaired (Sepsis/Septic Shock)  Goal: Blood Glucose Level Within Desired Range  Outcome: Ongoing, Progressing     Problem: Infection Progression (Sepsis/Septic Shock)  Goal: Absence of Infection Signs and Symptoms  Outcome: Ongoing, Progressing     Problem: Nutrition Impaired (Sepsis/Septic Shock)  Goal: Optimal Nutrition Intake  Outcome: Ongoing, Progressing     Problem: Infection  Goal: Absence of Infection Signs and Symptoms  Outcome: Ongoing, Progressing

## 2022-08-13 NOTE — ASSESSMENT & PLAN NOTE
Patient presented with tachycardia and elevated lactic acid.  Empirically started on ABx's.  Abnormal UA, but no urinary symptoms.  All cultures negative so far.  Stopped all ABx's.   Pending Cdiff PCR, but patient with no BM in 24 hours.  Afebrile

## 2022-08-13 NOTE — ASSESSMENT & PLAN NOTE
Noted drop in H/H and Platelets.  Could be related to hemodilution.  Anemia and thrombocytopenia secondary to chemotherapy.  No evidence of bleeding.  H/H low, but stable.  Hold off on transfusion for now.

## 2022-08-14 NOTE — ASSESSMENT & PLAN NOTE
Patient presented with tachycardia and elevated lactic acid.  Empirically started on ABx's.  Abnormal UA, but no urinary symptoms.  All cultures negative so far.  Stopped all ABx's.   Cdiff Ag +, but toxin negative.  Patient with minimal bowel movements.  PCR came back positive.  Will start oral Vanc for possible Cdiff infection.  Doubt that sepsis associated to Cdiff infection.  Afebrile

## 2022-08-14 NOTE — PT/OT/SLP EVAL
"Occupational Therapy   Evaluation    Name: Janusz Montgomery Jr.  MRN: [id number]  Admitting Diagnosis:  Sepsis  Recent Surgery: * No surgery found *      Recommendations:     Discharge Recommendations: home health OT  Discharge Equipment Recommendations:  wheelchair  Barriers to discharge:  None    Assessment:     Janusz Montgomery Jr. is a 77 y.o. male with a medical diagnosis of Sepsis.   Performance deficits affecting function: weakness, impaired endurance, impaired self care skills, gait instability, impaired functional mobility, impaired balance, decreased upper extremity function, decreased safety awareness, decreased lower extremity function, decreased coordination, impaired cardiopulmonary response to activity, edema, impaired skin.      Pt very pleasant and willing to participate in tx session this date; pt w/ complaints of weakness and SOB w/ activity but was able to ambulate functional distances in room for multiple trials w/ CGA-SBA and use or RW w/ SPO2 >95% on 4L O2 NC. Pt tolerated tx session well and will continue to benefit from skilled acute OT services to maximize functional capacity for safe performance w/ ADLs and functional mobility.     Rehab Prognosis: Good; patient would benefit from acute skilled OT services to address these deficits and reach maximum level of function.       Plan:     Patient to be seen 5 x/week to address the above listed problems via self-care/home management, therapeutic activities, therapeutic exercises  · Plan of Care Expires: 08/28/22  · Plan of Care Reviewed with: patient    Subjective     Chief Complaint: "Up until last week I was fine; now I get so tired when I have to walk far."  Patient/Family Comments/goals: Wants a W/C; having difficulty ambulating far distances in the community     Occupational Profile:  Living Environment: Patient lives with his wife in a [facility].  There are no steps to enter.  The bathroom has a built-in bath bench.      Previous Level of Function: " Patient was Mod I for T/F's and ADL's with use of a RW.  He reports that he had a BSC but did not use it.  Roles and Routines: Patient reports that he was mod I w/ use of his RW. Does not drive.    Equipment Used at Home:  CPAP, bedside commode, bath bench, oxygen, walker, rolling, cane, straight  Assistance upon Discharge: Spouse; dtr     Pain/Comfort:  · Location - Side 1: Left  · Location 1:  (toes)    Patients cultural, spiritual, Confucianist conflicts given the current situation: no    Objective:     Communicated with: Nurse prior to session.  Patient found HOB elevated with oxygen, SCD, telemetry upon OT entry to room.    General Precautions: Standard, fall, contact   Orthopedic Precautions:N/A   Braces: N/A  Respiratory Status: Nasal cannula, flow 4 L/min    Occupational Performance:    Bed Mobility:    · Patient completed Scooting/Bridging with stand by assistance  · Patient completed Supine to Sit with stand by assistance    Functional Mobility/Transfers:  · Patient completed Sit <> Stand Transfer with stand by assistance and contact guard assistance  with  rolling walker   · Functional Mobility: Pt was able to ambulate functional distance in room for multiple trials w/ CGA-SBA and use of RW.     Activities of Daily Living:  · Upper Body Dressing: minimum assistance for donning gown over back   · Lower Body Dressing: total assistance for doffing soiled brief and donning new one after pt had mild diarrhea    · Toileting: total assistance for performing standing adriana-care w/ PT providing steadying assist while OT assisted w/ care    Cognitive/Visual Perceptual:  Cognitive/Psychosocial Skills:     -       Oriented to: Person, Place, Time and Situation   -       Follows Commands/attention:Follows multistep  commands  -       Communication: clear/fluent  -       Memory: No Deficits noted  -       Safety awareness/insight to disability: intact     Physical Exam:  Balance:    -       good sitting balance; fair +  standing   Postural examination/scapula alignment:    -       Rounded shoulders  -       Forward head  Skin integrity: dry, flaky skin to R side of neck; mildly red; pt w/ old wound to inside of L heel; pt w/ pain to 2nd toe w/ small wound noted   Edema:  Moderate BLEs  Sensation:    -       Intact  Upper Extremity Range of Motion:     -       Right Upper Extremity: WFL  -       Left Upper Extremity: WFL  Upper Extremity Strength:    -       Right Upper Extremity: WFL  -       Left Upper Extremity: WFL   Strength:    -       Right Upper Extremity: WFL  -       Left Upper Extremity: WFL    AMPAC 6 Click ADL:  AMPAC Total Score: 17    Treatment & Education:  -Initial eval complete.   -Pt educate on role of OT and POC.   -Pt educated on safe functional mobility.   -Pt encouraged to sit up on chair for preventing debility and weakness due to excessive bed rest.   -Pt educated on importance of PLB w/ exertion.   -Questions and concerns addressed within OT scope.  Education:    Patient left up in chair with all lines intact, call button in reach and bed alarm on    GOALS:   Multidisciplinary Problems     Occupational Therapy Goals        Problem: Occupational Therapy    Goal Priority Disciplines Outcome Interventions   Occupational Therapy Goal     OT, PT/OT Ongoing, Progressing    Description: Goals to be met by: 8/28/22    Patient will increase functional independence with ADLs by performing:    LE Dressing with Modified [place].  Grooming while standing at sink with Modified [place].  Toileting from toilet with Modified [place] for hygiene and clothing management.   Supine to sit with Modified [place].  Step transfer with Modified [place]  Toilet transfer to toilet with Modified [place].  Upper extremity exercise program x15 reps per handout, with independence.  Implementation of PLB and energy conservation strategies into daily routines w/ (I).                      History:     Past  "Medical History:   Diagnosis Date    (HFpEF) heart failure with preserved ejection fraction 02/04/2022    NAT (acute kidney injury) 03/19/2017    ALLERGIC RHINITIS     Anemia     Anxiety     Basal cell carcinoma 10/19/2018    forehead and right medial shoulder    Basal cell carcinoma 01/09/2019    left nasal bridge and left posterior ear    Basal cell carcinoma 06/12/2020    left lower medial leg - Efudex    Basal cell carcinoma 11/30/2020    left medial upper eyelid canthus superficial BCC    Chronic rhinitis 05/03/2013    Chronic rhinitis 05/03/2013    Coronary artery disease involving native coronary artery of native heart without angina pectoris s/p RCA stent     Cortical cataract of both eyes 03/18/2016    Delayed sleep phase syndrome 03/13/2019    Depression     Erectile dysfunction 03/24/2014    Erectile dysfunction 03/24/2014    Essential hypertension     GERD (gastroesophageal reflux disease) 07/25/2012    Gout, arthritis     Grade III open fracture of left tibia and fibula s/p ex-fix on 7/1/16 and ORIF of left tibia on 7/15 07/06/2016    H/O: iritis     Helicobacter pylori (H. pylori) infection     Treated    Hepatitis     Herpes simplex keratoconjunctivitis 09/30/2015    - on acyclovir - followed by opthalmology, Dr. Uribe     Herpes simplex keratoconjunctivitis 09/30/2015    - on acyclovir - followed by opthalmology, Dr. Uribe     Hyperkalemia 02/28/2017    Hyperlipidemia     Hypogonadism male     Hypogonadism male     Immunodeficiency due to drug therapy 07/08/2022    Keloid cicatrix     Lymphoma     HEAD & NECK    Mixed anxiety and depressive disorder     Morbid obesity     Obstructive sleep apnea on CPAP     CPAP    On home oxygen therapy     Osteoarthritis of left knee 07/25/2012    Paroxysmal atrial fibrillation 07/06/2016    Primary osteoarthritis of left knee 07/25/2012    Prominent aorta 01/25/2016    "RESULTS: THE HEART IS MILDLY ENLARGED WITH A " "SLIGHTLY PROMINENT AORTA" - Xray Chest PA & Lateral 12-     Prostate cancer 02/15/2016    - followed by urology, Dr. Young     Prostate cancer 02/15/2016    - followed by urology, Dr. 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    chest and forehead    Squamous cell cancer of skin of nose     Traumatic type III open fracture of shaft of left tibia and fibula with nonunion 07/06/2016    Type III open fracture of left tibia and fibula with routine healing 07/06/2016    Vitamin D deficiency disease     Vitreous detachment 03/18/2016       Past Surgical History:   Procedure Laterality Date    ADJACENT TISSUE TRANSFER Left 11/30/2020    Procedure: ADJACENT TISSUE TRANSFER x2 ;  Surgeon: Teresa Cooper MD;  Location: [facility] 1ST FLR;  Service: Ophthalmology;  Laterality: Left;  Left glabellar 8.5x11x3 and Left upper eyelid advancement flap [id number]      Cardiac stenting x2      CATARACT EXTRACTION W/  INTRAOCULAR LENS IMPLANT Right 3/29/2016    Dr. Conteh    CATARACT EXTRACTION W/  INTRAOCULAR LENS IMPLANT Left 4/12/2016        COLONOSCOPY N/A 7/23/2020    Procedure: COLONOSCOPY;  Surgeon: Nico Lackey MD;  Location: [facility] (2ND FLR);  Service: Endoscopy;  Laterality: N/A;    DIRECT LARYNGOSCOPY N/A 5/4/2022    Procedure: LARYNGOSCOPY, DIRECT possible biopsy;  Surgeon: Etta Higgins MD;  Location: [facility];  Service: ENT;  Laterality: N/A;  RN PREOP ON 4/29/22. NEEDS T&S DAY OF SURGERY--NF  CONSENTS DAY OF SURGERY    DISSECTION OF NECK Right 5/4/2022    Procedure: RADICAL NECK DISSECTION;  Surgeon: Etta Higgins MD;  Location: [facility] OR;  Service: ENT;  Laterality: " "Right;  TO FOLLOW DR ALMAZAN    ESOPHAGOGASTRODUODENOSCOPY N/A 7/23/2020    Procedure: EGD (ESOPHAGOGASTRODUODENOSCOPY);  Surgeon: Nico Lackey MD;  Location: [address]);  Service: Endoscopy;  Laterality: N/A;  per Dr Lackey-Will proceed with EGD and colonoscopy on the 2nd floor due to his comorbidities including obesity, sleep apnea, restrictive lung disease, coronary artery disease.  has loop recorder      ok to hold Eliquis 2 days per Dr Sandhu-must remain    EXTERNAL FIXATION TIBIAL FRACTURE Left 07/01/2016    INSERTION OF IMPLANTABLE LOOP RECORDER  04/07/2017    LEFT HEART CATHETERIZATION Left 1/30/2020    Procedure: Left heart cath;  Surgeon: Benjamin Sandhu MD;  Location: [facility] CATH LAB;  Service: Cardiology;  Laterality: Left;  RN PREOP 1/28/20--Pt starting Plavix loading dose today (8pills)- Dr. Sandhu aware.  Pt has a bandaged "non healing area to LLE"--Dr. Sandhu aware.    LEFT HEART CATHETERIZATION Left 2/14/2020    Procedure: Left heart cath, IVUS guided left main / LAD PCI. Noon start, radial approach;  Surgeon: Miguel Angel Brady MD;  Location: [facility] CATH LAB;  Service: Cardiology;  Laterality: Left;  RN PRE OP 2-7-2020  BMI--45.61    ORIF TIBIA FRACTURE Left 07/15/2016    PROBING OF NASOLACRIMAL DUCT WITH INSERTION OF TUBE Left 11/30/2020    Procedure: PROBING, NASOLACRIMAL DUCT,;  Surgeon: Teresa Cooper MD;  Location: [address];  Service: Ophthalmology;  Laterality: Left;    RADIOACTIVE SEED IMPLANTATION OF PROSTATE N/A 8/8/2018    Procedure: INSERTION, RADIOACTIVE SEED, PROSTATE;  Surgeon: Bipin Thompson MD;  [facility];  Service: Urology;  Laterality: N/A;  1 hour    SKIN BIOPSY      Squamous cell cancer removal x3 with Mohs surgery      TONSILLECTOMY      TOTAL KNEE ARTHROPLASTY  10/2012    TRIGGER FINGER RELEASE Right 10/14/2020    Procedure: RELEASE, TRIGGER FINGER - right;  Surgeon: Rad Swift MD;  Location: [facility];  Service: Orthopedics;  " Laterality: Right;    trus/bx      ULTRASOUND GUIDANCE  2/14/2020    Procedure: Ultrasound Guidance;  Surgeon: Miguel Angel Brady MD;  Location: [facility] CATH LAB;  Service: Cardiology;;       Time Tracking:     OT Date of Treatment: 08/14/22  OT Start Time: 1045  OT Stop Time: 1109  OT Total Time (min): 24 min    Billable Minutes:Evaluation 15  Self Care/Home Management 9  Total Time 24 (co-eval w/ PT)       8/14/2022

## 2022-08-14 NOTE — PLAN OF CARE
Chart check complete, pt AAOx4, able to verbalize needs.  Tele box monitored.  IV flushed and saline locked.  Urinal in reach for use, no BM this shift.  Pt on 4L oxygen, tolerating well, home CPAP used at night.  Diet tolerated well, pt denies N/V.  NO pain reported.  Swelling noted to bilateral legs and right arm.  SCD's in use.  No acute distress noted, pt free from falls or injury this shift.  Bed on low position, wheels locked, bed alarm on, call light in reach for assistance, will continue to monitor.        Problem: Adult Inpatient Plan of Care  Goal: Plan of Care Review  Outcome: Ongoing, Progressing     Problem: Adult Inpatient Plan of Care  Goal: Patient-Specific Goal (Individualized)  Outcome: Ongoing, Progressing     Problem: Adult Inpatient Plan of Care  Goal: Absence of Hospital-Acquired Illness or Injury  Outcome: Ongoing, Progressing     Problem: Adult Inpatient Plan of Care  Goal: Optimal Comfort and Wellbeing  Outcome: Ongoing, Progressing     Problem: Bariatric Environmental Safety  Goal: Safety Maintained with Care  Outcome: Ongoing, Progressing     Problem: Fall Injury Risk  Goal: Absence of Fall and Fall-Related Injury  Outcome: Ongoing, Progressing     Problem: Glycemic Control Impaired (Sepsis/Septic Shock)  Goal: Blood Glucose Level Within Desired Range  Outcome: Ongoing, Progressing     Problem: Infection Progression (Sepsis/Septic Shock)  Goal: Absence of Infection Signs and Symptoms  Outcome: Ongoing, Progressing     Problem: Nutrition Impaired (Sepsis/Septic Shock)  Goal: Optimal Nutrition Intake  Outcome: Ongoing, Progressing

## 2022-08-14 NOTE — PLAN OF CARE
Problem: Physical Therapy  Goal: Physical Therapy Goal  Description: Goals to be met by: 22     Patient will increase functional independence with mobility by performin. Supine to sit with Set-up [place]  2. Rolling to Left and Right with Supervision.  3. Sit to stand transfer with Supervision  4. Gait  x 300 feet with Supervision using Rolling Walker.   5. Wheelchair propulsion x300 feet with Supervision using bilateral uppper extremities    Outcome: Ongoing, Progressing     PT evaluation completed today. Pt presents on 4.5 L O2. Pt performs bed mobility w/ set up assist. Pt transfers sit<>stand w/ RW and CGAx1. Pt amb 40 ft within room w/ RW and cues for pacing. Pt transfers to chair w/ RW and CGAx1. Pt SOB upon conclusion of gait assessment however once seated and cued for pursed lip breathing, pt's SpO2 returned to 98%. Pt would benefit from skilled therapy to maximize strength, endurance and cardiovascular response to activity in order to return to mobility PLOF. Pt would benefit from HHPT and a w/c upon d/c.

## 2022-08-14 NOTE — PROGRESS NOTES
[facility] Medicine  Progress Note    Patient Name: Janusz Montgomery Jr.  MRN: [id number]  Patient Class: IP- Inpatient   Admission Date: 8/10/2022  Length of Stay: 3 days  Attending Physician: Gonzalo Miranda MD  Primary Care Provider: Miguelina Weathers MD        Subjective:     Principal Problem:Sepsis        HPI:  No notes on file    Overview/Hospital Course:  76 y/o male with hx of head and neck cancer on chemo treatment presents with progressive weakness.  Noted to be tachycardic and with elevated lactic acid.  Initially admitted with possible sepsis and started on broad spectrum ABx's.  Also with dehydration and started on IVF hydration.  No evidence of infection and ABx's stopped.  Noted to have decreasing H/H.  Recent chemo and Heme/Onc consulted.  Also noted to have increase in Creat.      Interval History: weak and tired.    Review of Systems   HENT:  Negative for ear discharge and ear pain.    Eyes:  Negative for discharge and itching.   Endocrine: Negative for cold intolerance and heat intolerance.   Neurological:  Negative for seizures and syncope.   Objective:     Vital Signs (Most Recent):  Temp: 97.9 °F (36.6 °C) (08/14/22 1216)  Pulse: 65 (08/14/22 1216)  Resp: 20 (08/14/22 1216)  BP: (!) 124/55 (08/14/22 1216)  SpO2: 97 % (08/14/22 1216)   Vital Signs (24h Range):  Temp:  [97.8 °F (36.6 °C)-98.5 °F (36.9 °C)] 97.9 °F (36.6 °C)  Pulse:  [] 65  Resp:  [18-22] 20  SpO2:  [92 %-100 %] 97 %  BP: (124-180)/(55-83) 124/55     Weight: (!) 146.7 kg (323 lb 6.6 oz)  Body mass index is 42.67 kg/m².    Intake/Output Summary (Last 24 hours) at 8/14/2022 1424  Last data filed at 8/14/2022 0600  Gross per 24 hour   Intake 1666.47 ml   Output 1000 ml   Net 666.47 ml        Physical Exam  Constitutional:       General: He is not in acute distress.     Appearance: He is not diaphoretic.   HENT:      Head: Normocephalic and atraumatic.      Mouth/Throat:      Mouth: Mucous membranes are dry.       Pharynx: No oropharyngeal exudate or posterior oropharyngeal erythema.   Cardiovascular:      Rate and Rhythm: Normal rate and regular rhythm.   Pulmonary:      Comments: Slight tachypnea.  Slight bilateral expiratory wheezing.  Abdominal:      General: Bowel sounds are normal.      Palpations: Abdomen is soft.   Musculoskeletal:      Right lower leg: Edema present.      Left lower leg: Edema present.   Skin:     Comments: Scaly erythema around neckline.  Erythema to bilateral lower extremities.  No warmth.   Neurological:      Mental Status: He is oriented to person, place, and time.      Cranial Nerves: No cranial nerve deficit.       Significant Labs: All pertinent labs within the past 24 hours have been reviewed.  BMP:   Recent Labs   Lab 08/14/22  0354      *   K 3.9      CO2 27   BUN 16   CREATININE 2.4*   CALCIUM 8.9   MG 1.8       CBC:   Recent Labs   Lab 08/13/22  0331 08/14/22  0354   WBC 5.38 5.57   HGB 7.2* 7.3*   HCT 21.4* 23.0*   PLT 96* 116*         Significant Imaging: I have reviewed all pertinent imaging results/findings within the past 24 hours.      Assessment/Plan:      * Sepsis  Patient presented with tachycardia and elevated lactic acid.  Empirically started on ABx's.  Abnormal UA, but no urinary symptoms.  All cultures negative so far.  Stopped all ABx's.   Cdiff Ag +, but toxin negative.  Patient with minimal bowel movements.  PCR came back positive.  Will start oral Vanc for possible Cdiff infection.  Doubt that sepsis associated to Cdiff infection.  Afebrile     Pancytopenia  Noted drop in H/H and Platelets.  Could be related to hemodilution.  Anemia and thrombocytopenia secondary to chemotherapy.  No evidence of bleeding.  H/H low, but stable.  Hold off on transfusion for now.      Weakness  PT/OT evaluation.      Squamous cell carcinoma of neck  Patient with decreasing oral intake and worsening generalized weakness.  Concerned about possible deterioration of  functional and nutrition status and presentation related to cancer +/- treatment.  Appreciate Heme/Onc input.      Acute respiratory insufficiency  Patient with chronic hypoxemic respiratory failure on home oxygen.  Seems to be at baseline.    Stage 3a chronic kidney disease  Baseline Creat around 1.5.  With ARF  Avoid nephrotoxic medications.  Closely monitor renal function.  Creat of 2.4.  Patient with adequate urine output.  Will continue to closely monitor.    Paroxysmal atrial fibrillation   Patient with Paroxysmal (<7 days) atrial fibrillation which is controlled currently with Amiodarone. Patient is currently in sinus rhythm.[id number] Score: 3. HASBLED Score: Unable to calculate. Anticoagulation indicated. Anticoagulation done with Eliquis.        CHARISMA on CPAP  Nightly CPAP      Coronary artery disease involving native coronary artery of native heart without angina pectoris s/p RCA stent  Chest pain free.  Continue Plavix.      VTE Risk Mitigation (From admission, onward)         Ordered     apixaban tablet 5 mg  2 times daily         08/11/22 0409     IP VTE HIGH RISK PATIENT  Once         08/11/22 0409     Place sequential compression device  Until discontinued         08/11/22 0409                Discharge Planning   KIARA:      Code Status: Full Code   Is the patient medically ready for discharge?:     Reason for patient still in hospital (select all that apply): Patient trending condition  Discharge Plan A: Home with family                  Gonzalo Miranda MD  Department of Hospital Medicine   [facility]

## 2022-08-14 NOTE — ASSESSMENT & PLAN NOTE
Patient with Paroxysmal (<7 days) atrial fibrillation which is controlled currently with Amiodarone. Patient is currently in sinus rhythm.[id number] Score: 3. HASBLED Score: Unable to calculate. Anticoagulation indicated. Anticoagulation done with Eliquis.

## 2022-08-14 NOTE — PLAN OF CARE
Problem: Occupational Therapy  Goal: Occupational Therapy Goal  Description: Goals to be met by: 8/28/22    Patient will increase functional independence with ADLs by performing:    LE Dressing with Modified [place].  Grooming while standing at sink with Modified [place].  Toileting from toilet with Modified [place] for hygiene and clothing management.   Supine to sit with Modified [place].  Step transfer with Modified [place]  Toilet transfer to toilet with Modified [place].  Upper extremity exercise program x15 reps per handout, with independence.  Implementation of PLB and energy conservation strategies into daily routines w/ (I).     8/14/2022 1353 by Stalin Leon OT  Outcome: Ongoing, Progressing    Pt very pleasant and willing to participate in tx session this date; pt w/ complaints of weakness and SOB w/ activity but was able to ambulate functional distances in room for multiple trials w/ CGA-SBA and use or RW w/ SPO2 >95% on 4L O2 NC. Pt tolerated tx session well and will continue to benefit from skilled acute OT services to maximize functional capacity for safe performance w/ ADLs and functional mobility.

## 2022-08-14 NOTE — PLAN OF CARE
Pt remained free from falls and injury throughout shift. C Diff PRC positive, started on PO Vanc. Thorough education session completed with pt and spouse regarding C.DIFF infection, infection control, mode of transmission, hand washing, laundry. No resp distress noted, denies any pain, up in chair most of shift.   Problem: Adult Inpatient Plan of Care  Goal: Plan of Care Review  Outcome: Ongoing, Progressing  Goal: Patient-Specific Goal (Individualized)  Outcome: Ongoing, Progressing  Goal: Absence of Hospital-Acquired Illness or Injury  Outcome: Ongoing, Progressing  Goal: Optimal Comfort and Wellbeing  Outcome: Ongoing, Progressing  Goal: Readiness for Transition of Care  Outcome: Ongoing, Progressing     Problem: Bariatric Environmental Safety  Goal: Safety Maintained with Care  Outcome: Ongoing, Progressing     Problem: Fall Injury Risk  Goal: Absence of Fall and Fall-Related Injury  Outcome: Ongoing, Progressing     Problem: Skin Injury Risk Increased  Goal: Skin Health and Integrity  Outcome: Ongoing, Progressing     Problem: Adjustment to Illness (Sepsis/Septic Shock)  Goal: Optimal Coping  Outcome: Ongoing, Progressing     Problem: Bleeding (Sepsis/Septic Shock)  Goal: Absence of Bleeding  Outcome: Ongoing, Progressing     Problem: Glycemic Control Impaired (Sepsis/Septic Shock)  Goal: Blood Glucose Level Within Desired Range  Outcome: Ongoing, Progressing     Problem: Infection Progression (Sepsis/Septic Shock)  Goal: Absence of Infection Signs and Symptoms  Outcome: Ongoing, Progressing     Problem: Nutrition Impaired (Sepsis/Septic Shock)  Goal: Optimal Nutrition Intake  Outcome: Ongoing, Progressing     Problem: Infection  Goal: Absence of Infection Signs and Symptoms  Outcome: Ongoing, Progressing

## 2022-08-14 NOTE — SUBJECTIVE & OBJECTIVE
Interval History: weak and tired.    Review of Systems   HENT:  Negative for ear discharge and ear pain.    Eyes:  Negative for discharge and itching.   Endocrine: Negative for cold intolerance and heat intolerance.   Neurological:  Negative for seizures and syncope.   Objective:     Vital Signs (Most Recent):  Temp: 97.9 °F (36.6 °C) (08/14/22 1216)  Pulse: 65 (08/14/22 1216)  Resp: 20 (08/14/22 1216)  BP: (!) 124/55 (08/14/22 1216)  SpO2: 97 % (08/14/22 1216)   Vital Signs (24h Range):  Temp:  [97.8 °F (36.6 °C)-98.5 °F (36.9 °C)] 97.9 °F (36.6 °C)  Pulse:  [] 65  Resp:  [18-22] 20  SpO2:  [92 %-100 %] 97 %  BP: (124-180)/(55-83) 124/55     Weight: (!) 146.7 kg (323 lb 6.6 oz)  Body mass index is 42.67 kg/m².    Intake/Output Summary (Last 24 hours) at 8/14/2022 1424  Last data filed at 8/14/2022 0600  Gross per 24 hour   Intake 1666.47 ml   Output 1000 ml   Net 666.47 ml        Physical Exam  Constitutional:       General: He is not in acute distress.     Appearance: He is not diaphoretic.   HENT:      Head: Normocephalic and atraumatic.      Mouth/Throat:      Mouth: Mucous membranes are dry.      Pharynx: No oropharyngeal exudate or posterior oropharyngeal erythema.   Cardiovascular:      Rate and Rhythm: Normal rate and regular rhythm.   Pulmonary:      Comments: Slight tachypnea.  Slight bilateral expiratory wheezing.  Abdominal:      General: Bowel sounds are normal.      Palpations: Abdomen is soft.   Musculoskeletal:      Right lower leg: Edema present.      Left lower leg: Edema present.   Skin:     Comments: Scaly erythema around neckline.  Erythema to bilateral lower extremities.  No warmth.   Neurological:      Mental Status: He is oriented to person, place, and time.      Cranial Nerves: No cranial nerve deficit.       Significant Labs: All pertinent labs within the past 24 hours have been reviewed.  BMP:   Recent Labs   Lab 08/14/22  0354      *   K 3.9      CO2 27   BUN 16    CREATININE 2.4*   CALCIUM 8.9   MG 1.8       CBC:   Recent Labs   Lab 08/13/22  0331 08/14/22  0354   WBC 5.38 5.57   HGB 7.2* 7.3*   HCT 21.4* 23.0*   PLT 96* 116*         Significant Imaging: I have reviewed all pertinent imaging results/findings within the past 24 hours.

## 2022-08-14 NOTE — PT/OT/SLP EVAL
Physical Therapy Evaluation    Patient Name:  Janusz Montgomery Jr.   MRN:  [id number]    Recommendations:     Discharge Recommendations:  home health PT   Discharge Equipment Recommendations: wheelchair   Barriers to discharge: trending medical condition    Assessment:     Janusz Montgomery Jr. is a 77 y.o. male admitted with a medical diagnosis of Sepsis.  He presents with the following impairments/functional limitations:  weakness, impaired endurance, impaired self care skills, impaired functional mobility, gait instability, impaired balance, decreased lower extremity function, impaired skin, impaired cardiopulmonary response to activity Pt presents on 4.5 L O2. Pt performs bed mobility w/ set up assist. Pt transfers sit<>stand w/ RW and CGAx1. Pt amb 40 ft within room w/ RW and cues for pacing. Pt transfers to chair w/ RW and CGAx1. Pt SOB upon conclusion of gait assessment however once seated and cued for pursed lip breathing, pt's SpO2 returned to 98%. Pt would benefit from skilled therapy to maximize strength, endurance and cardiovascular response to activity in order to return to mobility PLOF.    Rehab Prognosis: Good; patient would benefit from acute skilled PT services to address these deficits and reach maximum level of function.    Recent Surgery: * No surgery found *      Plan:     During this hospitalization, patient to be seen 5 x/week to address the identified rehab impairments via gait training, neuromuscular re-education, therapeutic activities, therapeutic exercises, wheelchair management/training and progress toward the following goals:    · Plan of Care Expires:  08/28/22    Subjective     Chief Complaint: pain in R heel, SOB on exertion   Patient/Family Comments/goals: regain strength/ endurance to maximize independence w/ mobility  Pain/Comfort:  Pain Rating 1: 0/10  Pain Rating Post-Intervention 1: 0/10    Patients cultural, spiritual, Yazdanism conflicts given the current situation: no    Living  Environment:  Pt lives w/ spouse in H w/ 0 JONATHAN; pt uses a walk-in tub w/ built in bench.   Prior to admission, patients level of function was modified independence w/ mobility/ ADL's. Pt limited in community ambulation 2/2 SOB, would benefit from w/c for doctors appointments.  Equipment used at home: CPAP, bedside commode, bath bench, oxygen, walker, rolling, cane, straight.  DME owned (not currently used): none.  Upon discharge, patient will have assistance from his wife.    Objective:     Communicated with Hyun killian, prior to session.  Patient found HOB elevated with oxygen, SCD, telemetry  upon PT entry to room.    General Precautions: Standard, fall, contact   Orthopedic Precautions:N/A   Braces: N/A  Respiratory Status: Nasal cannula, flow 4.5 L/min    Exams:  · Cognitive Exam:  Patient is oriented to Person, Place, Time and Situation  · Skin Integrity/Edema:      · -       Skin integrity: Wound on L plantar 1st, 3rd toes and on R heel    and redness to R heel  · RLE ROM: WFL  · RLE Strength: WFL  · LLE ROM: WFL  · LLE Strength: WFL    Functional Mobility:  · Bed Mobility:     · Rolling Left:  stand by assistance  · Scooting: stand by assistance  · Bridging: stand by assistance  · Supine to Sit: stand by assistance  · Sit to Supine: contact guard assistance  · Transfers:     · Sit to Stand:  contact guard assistance with rolling walker  · Gait: pt amb total of 40 ft w/ RW and CGAx1. Pt cued for pacing  · Balance: Good in sitting, fair in standing    Therapeutic Activities and Exercises:   Pt educated on AP's to be performed while seated in chair and when SCD's are not on    AM-PAC 6 CLICK MOBILITY  Total Score:17     Patient left up in chair with all lines intact, call button in reach and nsg notified.    GOALS:   Multidisciplinary Problems     Physical Therapy Goals        Problem: Physical Therapy    Goal Priority Disciplines Outcome Goal Variances Interventions   Physical Therapy Goal     PT, PT/OT  Ongoing, Progressing     Description: Goals to be met by: 22     Patient will increase functional independence with mobility by performin. Supine to sit with Set-up [place]  2. Rolling to Left and Right with Supervision.  3. Sit to stand transfer with Supervision  4. Gait  x 300 feet with Supervision using Rolling Walker.   5. Wheelchair propulsion x300 feet with Supervision using bilateral uppper extremities                     History:     Past Medical History:   Diagnosis Date    (HFpEF) heart failure with preserved ejection fraction 2022    NAT (acute kidney injury) 2017    ALLERGIC RHINITIS     Anemia     Anxiety     Basal cell carcinoma 10/19/2018    forehead and right medial shoulder    Basal cell carcinoma 2019    left nasal bridge and left posterior ear    Basal cell carcinoma 2020    left lower medial leg - Efudex    Basal cell carcinoma 2020    left medial upper eyelid canthus superficial BCC    Chronic rhinitis 2013    Chronic rhinitis 2013    Coronary artery disease involving native coronary artery of native heart without angina pectoris s/p RCA stent     Cortical cataract of both eyes 2016    Delayed sleep phase syndrome 2019    Depression     Erectile dysfunction 2014    Erectile dysfunction 2014    Essential hypertension     GERD (gastroesophageal reflux disease) 2012    Gout, arthritis     Grade III open fracture of left tibia and fibula s/p ex-fix on 16 and ORIF of left tibia on 7/15 2016    H/O: iritis     Helicobacter pylori (H. pylori) infection     Treated    Hepatitis     Herpes simplex keratoconjunctivitis 2015    - on acyclovir - followed by opthalmology, Dr. Uribe     Herpes simplex keratoconjunctivitis 2015    - on acyclovir - followed by opthalmology, Dr. Uirbe     Hyperkalemia 2017    Hyperlipidemia     Hypogonadism male     Hypogonadism  "male     Immunodeficiency due to drug therapy 07/08/2022    Keloid cicatrix     Lymphoma     HEAD & NECK    Mixed anxiety and depressive disorder     Morbid obesity     Obstructive sleep apnea on CPAP     CPAP    On home oxygen therapy     Osteoarthritis of left knee 07/25/2012    Paroxysmal atrial fibrillation 07/06/2016    Primary osteoarthritis of left knee 07/25/2012    Prominent aorta 01/25/2016    "RESULTS: THE HEART IS MILDLY ENLARGED WITH A SLIGHTLY PROMINENT AORTA" - Xray Chest PA & Lateral 12-     Prostate cancer 02/15/2016    - followed by urology, Dr. Young     Prostate cancer 02/15/2016    - followed by urology, Dr. 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    chest and forehead    Squamous cell cancer of skin of nose     Traumatic type III open fracture of shaft of left tibia and fibula with nonunion 07/06/2016    Type III open fracture of left tibia and fibula with routine healing 07/06/2016    Vitamin D deficiency disease     Vitreous detachment 03/18/2016       Past Surgical History:   Procedure Laterality Date    ADJACENT TISSUE TRANSFER Left 11/30/2020    Procedure: ADJACENT TISSUE TRANSFER x2 ;  Surgeon: Teresa Cooper MD;  Location: [address];  Service: Ophthalmology;  Laterality: Left;  Left glabellar 8.5x11x3 and Left upper eyelid advancement flap [id number]      Cardiac stenting x2      CATARACT EXTRACTION W/  INTRAOCULAR LENS IMPLANT Right 3/29/2016    Dr. Conteh    CATARACT EXTRACTION W/  INTRAOCULAR LENS IMPLANT Left 4/12/2016        COLONOSCOPY N/A 7/23/2020    Procedure: COLONOSCOPY;  Surgeon: Nico Lackey MD;  Location: [facility]" "(2ND FLR);  Service: Endoscopy;  Laterality: N/A;    DIRECT LARYNGOSCOPY N/A 5/4/2022    Procedure: LARYNGOSCOPY, DIRECT possible biopsy;  Surgeon: Etta Higgins MD;  Location: [facility] OR;  Service: ENT;  Laterality: N/A;  RN PREOP ON 4/29/22. NEEDS T&S DAY OF SURGERY--NF  CONSENTS DAY OF SURGERY    DISSECTION OF NECK Right 5/4/2022    Procedure: RADICAL NECK DISSECTION;  Surgeon: Etta Higgins MD;  Location: [facility] OR;  Service: ENT;  Laterality: Right;  TO FOLLOW DR ALMAZAN    ESOPHAGOGASTRODUODENOSCOPY N/A 7/23/2020    Procedure: EGD (ESOPHAGOGASTRODUODENOSCOPY);  Surgeon: Nico Lackey MD;  Location: [facility] (2ND FLR);  Service: Endoscopy;  Laterality: N/A;  per Dr Lackey-Will proceed with EGD and colonoscopy on the 2nd floor due to his comorbidities including obesity, sleep apnea, restrictive lung disease, coronary artery disease.  has loop recorder      ok to hold Eliquis 2 days per Dr Sandhu-must remain    EXTERNAL FIXATION TIBIAL FRACTURE Left 07/01/2016    INSERTION OF IMPLANTABLE LOOP RECORDER  04/07/2017    LEFT HEART CATHETERIZATION Left 1/30/2020    Procedure: Left heart cath;  Surgeon: Benjamin Sandhu MD;  Location: [facility] CATH LAB;  Service: Cardiology;  Laterality: Left;  RN PREOP 1/28/20--Pt starting Plavix loading dose today (8pills)- Dr. Sandhu aware.  Pt has a bandaged "non healing area to LLE"--Dr. Sandhu aware.    LEFT HEART CATHETERIZATION Left 2/14/2020    Procedure: Left heart cath, IVUS guided left main / LAD PCI. Noon start, radial approach;  Surgeon: Miguel Angel Brady MD;  Location: [facility] CATH LAB;  Service: Cardiology;  Laterality: Left;  RN PRE OP 2-7-2020  BMI--45.61    ORIF TIBIA FRACTURE Left 07/15/2016    PROBING OF NASOLACRIMAL DUCT WITH INSERTION OF TUBE Left 11/30/2020    Procedure: PROBING, NASOLACRIMAL DUCT,;  Surgeon: Teresa Cooper MD;  Location: [facility] 1ST FLR;  Service: Ophthalmology;  Laterality: Left;    RADIOACTIVE SEED IMPLANTATION OF PROSTATE N/A " 8/8/2018    Procedure: INSERTION, RADIOACTIVE SEED, PROSTATE;  Surgeon: Bipin Thompson MD;  Location: [address];  Service: Urology;  Laterality: N/A;  1 hour    SKIN BIOPSY      Squamous cell cancer removal x3 with Mohs surgery      TONSILLECTOMY      TOTAL KNEE ARTHROPLASTY  10/2012    TRIGGER FINGER RELEASE Right 10/14/2020    Procedure: RELEASE, TRIGGER FINGER - right;  Surgeon: Rad Swift MD;  Location[facility] OR;  Service: Orthopedics;  Laterality: Right;    trus/bx      ULTRASOUND GUIDANCE  2/14/2020    Procedure: Ultrasound Guidance;  Surgeon: Miguel Angel Brady MD;  Location: [facility] CATH LAB;  Service: Cardiology;;       Time Tracking:     PT Received On: 08/14/22  PT Start Time: 1045     PT Stop Time: 1110  PT Total Time (min): 25 min     Billable Minutes: Evaluation 10 and Therapeutic Activity 15      08/14/2022

## 2022-08-15 NOTE — PLAN OF CARE
Spoke with patient's spouse and daughter, Radha regarding discharge planning. Pt lives with spouse. And is ccepting to home health, did not have preferred agency. Explained private paid sitter options. Provided senior resource guide for private paid services and pace information for review. Recommendation for WC at discharge. Pt has rw. [place] to continue to monitor for discharge needs.    08/15/22 1215   Discharge Reassessment   Assessment Type Discharge Planning Reassessment   Did the patient's condition or plan change since previous assessment? No   Discharge Plan discussed with: Spouse/sig other;Adult children   Name(s) and Number(s) Bri Montgomery (Spouse) and Radha Torres (daughter) [phone]   Communicated KIARA with patient/caregiver Date not available/Unable to determine   Discharge Plan A Home Health   Discharge Plan B Home with family   DME Needed Upon Discharge  wheelchair   Discharge Barriers Identified None   Why the patient remains in the hospital Requires continued medical care   Post-Acute Status   Post-Acute Authorization Home Health   Home Health Status Pending medical clearance/testing   Coverage [employer] Managed medicare   Discharge Delays None known at this time

## 2022-08-15 NOTE — ASSESSMENT & PLAN NOTE
Patient with Paroxysmal (<7 days) atrial fibrillation which is controlled currently with Amiodarone. Patient is currently in sinus rhythm.[id number] Score: 3. HASBLED Score: Unable to calculate. Anticoagulation indicated. Anticoagulation done with Eliquis.

## 2022-08-15 NOTE — PLAN OF CARE
08/15/22 1215   Medicare Message   Important Message from Medicare regarding Discharge Appeal Rights Given to patient/caregiver;Explained to patient/caregiver;Signed/date by patient/caregiver   Date IMM was signed 08/15/22   Time IMM was signed 1211

## 2022-08-15 NOTE — ASSESSMENT & PLAN NOTE
Noted drop in H/H and Platelets.  Could be related to hemodilution.  Anemia and thrombocytopenia secondary to chemotherapy.  No evidence of bleeding.  Hgb of 6.8.  Patient symptomatic with severe fatigue.  Will transfuse one unit of blood.

## 2022-08-15 NOTE — PLAN OF CARE
Problem: Physical Therapy  Goal: Physical Therapy Goal  Description: Goals to be met by: 22     Patient will increase functional independence with mobility by performin. Supine to sit with Set-up [place]  2. Rolling to Left and Right with Supervision.  3. Sit to stand transfer with Supervision  4. Gait  x 300 feet with Supervision using Rolling Walker.   5. Wheelchair propulsion x300 feet with Supervision using bilateral uppper extremities    Outcome: Ongoing, Progressing   Patient ambulated ~45  feet on level tile with Rolling Walker with Stand-by Assistance on 4.5L/min Oxygen NC. Pt with demonstrating a  reciprocal gait with decreased sonali and decreased step length. Impairments contributing to gait deviations include impaired balance, decreased flexibility and decreased strength. Good Dynamic Sitting and Fair+ Dynamic Standing Balance. Pt performed Sit <> Stand x 2 trials from bed and BS Chair with stand by assistance using rolling walker. SpO2 maintained well throughout treatment SpO2 %. Pt presented with a skin rash on his (R) shoulder by the clavicle area, feeling numb and denied having pain, nurse notified.

## 2022-08-15 NOTE — SUBJECTIVE & OBJECTIVE
Interval History: no new complaints.    Review of Systems   HENT:  Negative for ear discharge and ear pain.    Eyes:  Negative for discharge and itching.   Endocrine: Negative for cold intolerance and heat intolerance.   Neurological:  Negative for seizures and syncope.   Objective:     Vital Signs (Most Recent):  Temp: 98.1 °F (36.7 °C) (08/15/22 1141)  Pulse: 65 (08/15/22 1143)  Resp: 20 (08/15/22 1141)  BP: (!) 108/53 (08/15/22 1143)  SpO2: 98 % (08/15/22 0814)   Vital Signs (24h Range):  Temp:  [97.6 °F (36.4 °C)-98.1 °F (36.7 °C)] 98.1 °F (36.7 °C)  Pulse:  [64-86] 65  Resp:  [18-20] 20  SpO2:  [95 %-100 %] 98 %  BP: (108-164)/(49-67) 108/53     Weight: (!) 146.7 kg (323 lb 6.6 oz)  Body mass index is 42.67 kg/m².    Intake/Output Summary (Last 24 hours) at 8/15/2022 1201  Last data filed at 8/15/2022 0900  Gross per 24 hour   Intake 600 ml   Output 1400 ml   Net -800 ml        Physical Exam  Constitutional:       General: He is not in acute distress.     Appearance: He is not diaphoretic.   HENT:      Head: Normocephalic and atraumatic.      Mouth/Throat:      Mouth: Mucous membranes are dry.      Pharynx: No oropharyngeal exudate or posterior oropharyngeal erythema.   Cardiovascular:      Rate and Rhythm: Normal rate and regular rhythm.   Pulmonary:      Comments: Slight tachypnea.  Slight bilateral expiratory wheezing.  Abdominal:      General: Bowel sounds are normal.      Palpations: Abdomen is soft.   Musculoskeletal:      Right lower leg: Edema present.      Left lower leg: Edema present.   Skin:     Comments: Scaly erythema around neckline.  Erythema to bilateral lower extremities.  No warmth.   Neurological:      Mental Status: He is oriented to person, place, and time.      Cranial Nerves: No cranial nerve deficit.       Significant Labs: All pertinent labs within the past 24 hours have been reviewed.  BMP:   Recent Labs   Lab 08/15/22  0525      *   K 4.1      CO2 30*   BUN 17    CREATININE 2.4*   CALCIUM 9.0   MG 1.7       CBC:   Recent Labs   Lab 08/14/22  0354 08/15/22  0525   WBC 5.57 3.98   HGB 7.3* 6.8*   HCT 23.0* 21.0*   * 104*         Significant Imaging: I have reviewed all pertinent imaging results/findings within the past 24 hours.

## 2022-08-15 NOTE — CONSULTS
[facility] Surg  Wound Care    Patient Name:  Janusz Montgomery Jr.   MRN:  [id number]  Date: 8/15/2022  Diagnosis: Sepsis    History:     Past Medical History:   Diagnosis Date    (HFpEF) heart failure with preserved ejection fraction 02/04/2022    NAT (acute kidney injury) 03/19/2017    ALLERGIC RHINITIS     Anemia     Anxiety     Basal cell carcinoma 10/19/2018    forehead and right medial shoulder    Basal cell carcinoma 01/09/2019    left nasal bridge and left posterior ear    Basal cell carcinoma 06/12/2020    left lower medial leg - Efudex    Basal cell carcinoma 11/30/2020    left medial upper eyelid canthus superficial BCC    Chronic rhinitis 05/03/2013    Chronic rhinitis 05/03/2013    Coronary artery disease involving native coronary artery of native heart without angina pectoris s/p RCA stent     Cortical cataract of both eyes 03/18/2016    Delayed sleep phase syndrome 03/13/2019    Depression     Erectile dysfunction 03/24/2014    Erectile dysfunction 03/24/2014    Essential hypertension     GERD (gastroesophageal reflux disease) 07/25/2012    Gout, arthritis     Grade III open fracture of left tibia and fibula s/p ex-fix on 7/1/16 and ORIF of left tibia on 7/15 07/06/2016    H/O: iritis     Helicobacter pylori (H. pylori) infection     Treated    Hepatitis     Herpes simplex keratoconjunctivitis 09/30/2015    - on acyclovir - followed by opthalmology, Dr. Uribe     Herpes simplex keratoconjunctivitis 09/30/2015    - on acyclovir - followed by opthalmology, Dr. Uribe     Hyperkalemia 02/28/2017    Hyperlipidemia     Hypogonadism male     Hypogonadism male     Immunodeficiency due to drug therapy 07/08/2022    Keloid cicatrix     Lymphoma     HEAD & NECK    Mixed anxiety and depressive disorder     Morbid obesity     Obstructive sleep apnea on CPAP     CPAP    On home oxygen therapy     Osteoarthritis of left knee 07/25/2012    Paroxysmal atrial fibrillation  "07/06/2016    Primary osteoarthritis of left knee 07/25/2012    Prominent aorta 01/25/2016    "RESULTS: THE HEART IS MILDLY ENLARGED WITH A SLIGHTLY PROMINENT AORTA" - Xray Chest PA & Lateral 12-     Prostate cancer 02/15/2016    - followed by urology, Dr. Young     Prostate cancer 02/15/2016    - followed by urology, Dr. 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    chest and forehead    Squamous cell cancer of skin of nose     Traumatic type III open fracture of shaft of left tibia and fibula with nonunion 07/06/2016    Type III open fracture of left tibia and fibula with routine healing 07/06/2016    Vitamin D deficiency disease     Vitreous detachment 03/18/2016       Social History     Socioeconomic History    Marital status:    Occupational History    Occupation: Retired    Tobacco Use    Smoking status: Never Smoker    Smokeless tobacco: Never Used   Substance and Sexual Activity    Alcohol use: Yes     Alcohol/week: 2.0 standard drinks     Types: 2 Cans of beer per week     Comment: occasionally, beer    Drug use: Never     Social Determinants of Health     Financial Resource Strain: Low Risk     Difficulty of Paying Living Expenses: Not hard at all   Food Insecurity: No Food Insecurity    Worried About Running Out of Food in the Last Year: Never true    Ran Out of Food in the Last Year: Never true   Transportation Needs: No Transportation Needs    Lack of Transportation (Medical): No    Lack of Transportation (Non-Medical): No   Physical Activity: Inactive    Days of Exercise per Week: 0 days    Minutes of Exercise per Session: 0 min   Stress: No Stress Concern Present "    Feeling of Stress : Not at all   Social Connections: Unknown    Frequency of Communication with Friends and Family: More than three times a week    Frequency of Social Gatherings with Friends and Family: Twice a week    Active Member of Clubs or Organizations: Yes    Attends Club or Organization Meetings: More than 4 times per year    Marital Status:    Housing Stability: Low Risk     Unable to Pay for Housing in the Last Year: No    Number of Places Lived in the Last Year: 1    Unstable Housing in the Last Year: No       Precautions:     Allergies as of 08/10/2022 - Reviewed 08/10/2022   Allergen Reaction Noted    Ciprofloxacin Rash 03/15/2017    Zosyn [piperacillin-tazobactam] Rash 03/17/2017    Bacitracin Itching and Rash 05/03/2012       WOC Assessment Details/Treatment   Consulted for wounds on neck  A 77 year old male admitted 8/10/22 from home with sepsis; acute respiratory insufficiency; abnormal urinalysis; Stage 3a CKD; paroxysmal atrial fibrillation; CHARISMA on CPAP; coronary artery disease  8/15 WBC 3.98 Hgb 6.8 Hct 21.0 Platelets 104K Alb 2.6 Weight 323 lb   On Isoflex mattress; Isidoro score 18  Assessment:  Photodocumentation    Right neck- site of radiation; dry crusty material on top of denuded area 9 cm x 9 cm. Has prescribed Strata XRT. Completed radiation treatments at [facility] August 1st.  Treatment:  Cleansed with Vashe. Applied Duoderm hydrogel to site of crusty material and under chin where site is uncomfortable.   Nursing to apply hydrogel daily and prn.   Discussed treatment plan with nursing and patient.  Orders placed.     08/15/2022

## 2022-08-15 NOTE — PT/OT/SLP PROGRESS
Physical Therapy Treatment    Patient Name:  Janusz Montgomery Jr.   MRN:  [id number]    Recommendations:     Discharge Recommendations:  home health PT   Discharge Equipment Recommendations: wheelchair   Barriers to discharge: trending medical condition    Assessment:     Janusz Montgomery Jr. is a 77 y.o. male admitted with a medical diagnosis of Sepsis.  He presents with the following impairments/functional limitations:  weakness, impaired endurance, impaired sensation, impaired self care skills, impaired functional mobility, gait instability, impaired balance, decreased coordination, decreased lower extremity function, decreased safety awareness, impaired cardiopulmonary response to activity, impaired skin, edema .    Pt agreeable to work with Therapist. Patient ambulated ~45  feet on level tile with Rolling Walker with Stand-by Assistance on 4.5L/min Oxygen NC. Pt with demonstrating a  reciprocal gait with decreased sonali and decreased step length. Impairments contributing to gait deviations include impaired balance, decreased flexibility and decreased strength. Good Dynamic Sitting and Fair+ Dynamic Standing Balance. Pt performed Sit <> Stand x 2 trials from bed and BS Chair with stand by assistance using rolling walker. SpO2 maintained well throughout treatment SpO2 %. Pt presented with a skin rash on his (R) shoulder by the clavicle area, feeling numb and denied having pain, nurse notified.      Rehab Prognosis: Good; patient would benefit from acute skilled PT services to address these deficits and reach maximum level of function.    Recent Surgery: * No surgery found *      Plan:     During this hospitalization, patient to be seen 5 x/week to address the identified rehab impairments via gait training, therapeutic activities, therapeutic exercises, neuromuscular re-education, wheelchair management/training and progress toward the following goals:    · Plan of Care Expires:  08/28/22    Subjective     Chief  Complaint: Pt has no c/o today.  Patient/Family Comments/goals: to get stronger  Pain/Comfort:  · Pain Rating 1: 0/10      Objective:     Communicated with nurse Marni prior to session.  Patient found HOB elevated with telemetry, oxygen upon PT entry to room.     General Precautions: Standard, airborne, contact (C-diff)   Orthopedic Precautions:N/A   Braces: N/A  Respiratory Status: Nasal cannula, flow 4.5 L/min     Functional Mobility:  · Bed Mobility:     · Rolling Left:  supervision  · Scooting: anterior scoot to EOB with stand by assistance  · Supine to Sit: stand by assistance  · Transfers:     · Sit to Stand: x 2 trials from bed and BS Chair with stand by assistance using rolling walker  · Chair transfer: stand by assistance with rolling walker using  Step Transfer  · Gait: Patient ambulated ~45 feet on level tile with Rolling Walker with Stand-by Assistance on 4.5L/min Oxygen NC. Pt with demonstrating a  reciprocal gait with decreased sonali and decreased step length. Impairments contributing to gait deviations include impaired balance, decreased flexibility and decreased strength.  · Balance: Good Dynamic Sitting and Fair+ Dynamic Standing      AM-PAC 6 CLICK MOBILITY  Turning over in bed (including adjusting bedclothes, sheets and blankets)?: 4  Sitting down on and standing up from a chair with arms (e.g., wheelchair, bedside commode, etc.): 3  Moving from lying on back to sitting on the side of the bed?: 4  Moving to and from a bed to a chair (including a wheelchair)?: 3  Need to walk in hospital room?: 3  Climbing 3-5 steps with a railing?: 2  Basic Mobility Total Score: 19       Therapeutic Activities and Exercises: Pt was provided and educated on HEPs handout. Pt performed HEPs in Sitting on EOB on 4.5L/min Oxygen NC, SpO2 %   AP 10 reps  LAQ 10 reps  Marching 10 reps  Pillow Squeezes 10 reps 3 sec hold  Hand Squeezes 10 reps 3 sec hold    Patient left up in chair on pillow for pressure relief  with tray table, all lines intact, call button in reach, nurse Marni notified and Daughter and Wife present..    GOALS:   Multidisciplinary Problems     Physical Therapy Goals        Problem: Physical Therapy    Goal Priority Disciplines Outcome Goal Variances Interventions   Physical Therapy Goal     PT, PT/OT Ongoing, Progressing     Description: Goals to be met by: 22     Patient will increase functional independence with mobility by performin. Supine to sit with Set-up [place]  2. Rolling to Left and Right with Supervision.  3. Sit to stand transfer with Supervision  4. Gait  x 300 feet with Supervision using Rolling Walker.   5. Wheelchair propulsion x300 feet with Supervision using bilateral uppper extremities                     Time Tracking:     PT Received On:    PT Start Time: 0956     PT Stop Time: 1035  PT Total Time (min): 39 min     Billable Minutes: Gait Training 8 min, Therapeutic Activity 16 min and Therapeutic Exercise 15 min    Treatment Type: Treatment  PT/PTA: PTA     PTA Visit Number: 1     08/15/2022

## 2022-08-15 NOTE — ASSESSMENT & PLAN NOTE
Patient presented with tachycardia and elevated lactic acid.  Empirically started on ABx's.  Abnormal UA, but no urinary symptoms.  All cultures negative so far.  Stopped all ABx's.   Cdiff Ag +, but toxin negative.  Patient with minimal bowel movements.  PCR came back positive.  Started oral Vanc for possible Cdiff infection.  Doubt true sepsis associated to Cdiff infection.  Afebrile

## 2022-08-15 NOTE — PROGRESS NOTES
[facility] Medicine  Progress Note    Patient Name: Janusz Montgomery Jr.  MRN: [id number]  Patient Class: IP- Inpatient   Admission Date: 8/10/2022  Length of Stay: 4 days  Attending Physician: Gonzalo Miranda MD  Primary Care Provider: Miguelina Weathers MD        Subjective:     Principal Problem:Sepsis        HPI:  No notes on file    Overview/Hospital Course:  78 y/o male with hx of head and neck cancer on chemo treatment presents with progressive weakness.  Noted to be tachycardic and with elevated lactic acid.  Initially admitted with possible sepsis and started on broad spectrum ABx's.  Also with dehydration and started on IVF hydration.  No evidence of infection and ABx's stopped.  Noted to have decreasing H/H.  Recent chemo and Heme/Onc consulted.  Also noted to have increase in Creat.  Creat higher than baseline, but stable and patient with adequate urine output.  Patient complained of loose stool prior to admit.  Minimal bowel movements during hospital stay, but Cdiff PCR collected on admit is positive.  Started on oral Vanc.  Hgb down to 6.8 and patient complaining of severe fatigue.      Interval History: no new complaints.    Review of Systems   HENT:  Negative for ear discharge and ear pain.    Eyes:  Negative for discharge and itching.   Endocrine: Negative for cold intolerance and heat intolerance.   Neurological:  Negative for seizures and syncope.   Objective:     Vital Signs (Most Recent):  Temp: 98.1 °F (36.7 °C) (08/15/22 1141)  Pulse: 65 (08/15/22 1143)  Resp: 20 (08/15/22 1141)  BP: (!) 108/53 (08/15/22 1143)  SpO2: 98 % (08/15/22 0814)   Vital Signs (24h Range):  Temp:  [97.6 °F (36.4 °C)-98.1 °F (36.7 °C)] 98.1 °F (36.7 °C)  Pulse:  [64-86] 65  Resp:  [18-20] 20  SpO2:  [95 %-100 %] 98 %  BP: (108-164)/(49-67) 108/53     Weight: (!) 146.7 kg (323 lb 6.6 oz)  Body mass index is 42.67 kg/m².    Intake/Output Summary (Last 24 hours) at 8/15/2022 1201  Last data filed at  8/15/2022 0900  Gross per 24 hour   Intake 600 ml   Output 1400 ml   Net -800 ml        Physical Exam  Constitutional:       General: He is not in acute distress.     Appearance: He is not diaphoretic.   HENT:      Head: Normocephalic and atraumatic.      Mouth/Throat:      Mouth: Mucous membranes are dry.      Pharynx: No oropharyngeal exudate or posterior oropharyngeal erythema.   Cardiovascular:      Rate and Rhythm: Normal rate and regular rhythm.   Pulmonary:      Comments: Slight tachypnea.  Slight bilateral expiratory wheezing.  Abdominal:      General: Bowel sounds are normal.      Palpations: Abdomen is soft.   Musculoskeletal:      Right lower leg: Edema present.      Left lower leg: Edema present.   Skin:     Comments: Scaly erythema around neckline.  Erythema to bilateral lower extremities.  No warmth.   Neurological:      Mental Status: He is oriented to person, place, and time.      Cranial Nerves: No cranial nerve deficit.       Significant Labs: All pertinent labs within the past 24 hours have been reviewed.  BMP:   Recent Labs   Lab 08/15/22  0525      *   K 4.1      CO2 30*   BUN 17   CREATININE 2.4*   CALCIUM 9.0   MG 1.7       CBC:   Recent Labs   Lab 08/14/22  0354 08/15/22  0525   WBC 5.57 3.98   HGB 7.3* 6.8*   HCT 23.0* 21.0*   * 104*         Significant Imaging: I have reviewed all pertinent imaging results/findings within the past 24 hours.      Assessment/Plan:      * Sepsis  Patient presented with tachycardia and elevated lactic acid.  Empirically started on ABx's.  Abnormal UA, but no urinary symptoms.  All cultures negative so far.  Stopped all ABx's.   Cdiff Ag +, but toxin negative.  Patient with minimal bowel movements.  PCR came back positive.  Started oral Vanc for possible Cdiff infection.  Doubt true sepsis associated to Cdiff infection.  Afebrile     Pancytopenia  Noted drop in H/H and Platelets.  Could be related to hemodilution.  Anemia and  thrombocytopenia secondary to chemotherapy.  No evidence of bleeding.  Hgb of 6.8.  Patient symptomatic with severe fatigue.  Will transfuse one unit of blood.      Weakness  PT/OT evaluation.      Squamous cell carcinoma of neck  Patient with decreasing oral intake and worsening generalized weakness.  Concerned about possible deterioration of functional and nutrition status and presentation related to cancer +/- treatment.  Appreciate Heme/Onc input.      Acute respiratory insufficiency  Patient with chronic hypoxemic respiratory failure on home oxygen.  Seems to be at baseline.    Stage 3a chronic kidney disease  Baseline Creat around 1.5.  With ARF  Avoid nephrotoxic medications.  Closely monitor renal function.  Creat of 2.4.  Patient with adequate urine output.  Will continue to closely monitor.    Paroxysmal atrial fibrillation   Patient with Paroxysmal (<7 days) atrial fibrillation which is controlled currently with Amiodarone. Patient is currently in sinus rhythm.[id number] Score: 3. HASBLED Score: Unable to calculate. Anticoagulation indicated. Anticoagulation done with Eliquis.        CHARISMA on CPAP  Nightly CPAP      Coronary artery disease involving native coronary artery of native heart without angina pectoris s/p RCA stent  Chest pain free.  Continue Plavix.      VTE Risk Mitigation (From admission, onward)         Ordered     apixaban tablet 5 mg  2 times daily         08/11/22 0409     IP VTE HIGH RISK PATIENT  Once         08/11/22 0409     Place sequential compression device  Until discontinued         08/11/22 0409                Discharge Planning   KIARA:      Code Status: Full Code   Is the patient medically ready for discharge?:     Reason for patient still in hospital (select all that apply): Patient trending condition  Discharge Plan A: Home with family                  Gonzalo Miranda MD  Department of Hospital Medicine   [facility]

## 2022-08-16 PROBLEM — A49.8 CLOSTRIDIUM DIFFICILE INFECTION: Status: ACTIVE | Noted: 2022-01-01

## 2022-08-16 PROBLEM — N17.9 ACUTE RENAL FAILURE SUPERIMPOSED ON STAGE 3 CHRONIC KIDNEY DISEASE: Status: ACTIVE | Noted: 2020-08-24

## 2022-08-16 PROBLEM — J96.11 CHRONIC RESPIRATORY FAILURE WITH HYPOXIA: Status: ACTIVE | Noted: 2022-01-01

## 2022-08-16 NOTE — NURSING
Pt resting in bed asleep. Scheduled medications given. Pt has Cpap on. IV saline lock. Isolation precautions maintained. Tele #[phone]. Pt was up in chair during shift but currently in bed resting, asleep. No complaints. Vitals assessed. Safety measures maintained. Will cont to monitor

## 2022-08-16 NOTE — PT/OT/SLP PROGRESS
Physical Therapy Treatment    Patient Name:  Janusz Montgomery Jr.   MRN:  [id number]    Recommendations:     Discharge Recommendations:  home health PT   Discharge Equipment Recommendations: wheelchair   Barriers to discharge: trending medical condition    Assessment:     Janusz Montgomery Jr. is a 77 y.o. male admitted with a medical diagnosis of Sepsis.  He presents with the following impairments/functional limitations:  weakness, impaired endurance, impaired self care skills, impaired functional mobility, gait instability, impaired balance, decreased upper extremity function, decreased lower extremity function, decreased safety awareness, impaired skin, edema, impaired cardiopulmonary response to activity .    Pt agreeable to work with Therapist. Patient ambulated ~65  feet on level tile with Rolling Walker with CGA on 4.5L/min Oxygen NC. Pt with demonstrating a  reciprocal gait with decreased sonali and decreased step length. Impairments contributing to gait deviations include impaired balance, decreased flexibility and decreased strength. Pt demo min wobbling during his gait training, had 3 LOB with turning and was able to regain his balance. Good Dynamic Sitting and Fair Dynamic Standing Balance. Pt performed Sit <> Stand x 2 trials from bed and BS Chair with SBA/CGA using RW. Pt had min SOB during gait training and a slightly drop on SpO2 level to 84% when sitting on EOB today, v/c for pursed lip breathing and SpO2 increased to 95%    Rehab Prognosis: Fair+; patient would benefit from acute skilled PT services to address these deficits and reach maximum level of function.    Recent Surgery: * No surgery found *      Plan:     During this hospitalization, patient to be seen 5 x/week to address the identified rehab impairments via gait training, therapeutic activities, therapeutic exercises, neuromuscular re-education, wheelchair management/training and progress toward the following goals:    · Plan of Care Expires:   "08/28/22    Subjective     Chief Complaint: "I could not sleep last night. I just took a nap earlier which helped me a lot."  Patient/Family Comments/goals: n/a  Pain/Comfort:  · Pain Rating 1: 0/10      Objective:     Communicated with nurse Marni prior to session.  Patient found up in chair with telemetry, oxygen upon PT entry to room.     General Precautions: Standard, airborne, contact (C-diff)   Orthopedic Precautions:N/A   Braces: N/A  Respiratory Status: Nasal cannula, flow 4.5 L/min     Functional Mobility:  · Bed Mobility:     · Sit to Supine: stand by assistance  · Transfers:     · Sit to Stand:  contact guard assistance with rolling walker  · Gait: Patient ambulated ~65  feet on level tile with Rolling Walker with CGA on 4.5L/min Oxygen NC. Pt with demonstrating a reciprocal gait with decreased sonali and decreased step length. Impairments contributing to gait deviations include impaired balance, decreased flexibility and decreased strength. Pt demo min wobbling during his gait training, had min SOB, v/c for pursed lip breathing tech; had 3 LOB with turning and was able to regain his balance.   Balance: Good Dynamic Sitting and Fair Dynamic Standing      AM-PAC 6 CLICK MOBILITY  Turning over in bed (including adjusting bedclothes, sheets and blankets)?: 4  Sitting down on and standing up from a chair with arms (e.g., wheelchair, bedside commode, etc.): 3  Moving from lying on back to sitting on the side of the bed?: 3  Moving to and from a bed to a chair (including a wheelchair)?: 3  Need to walk in hospital room?: 3  Climbing 3-5 steps with a railing?: 2  Basic Mobility Total Score: 18       Therapeutic Activities and Exercises: Pt performed ex in Seated position on EOB. Pt had min/mod edema to his BUE & BLE, educated to do AP, hand squeezes, and elevate above heart level. Pt verbalized understanding.   AP 10 reps  LAQ 10 reps  Marching 10 reps  Pillow Squeezes 10 reps 3 sec hold  Bilateral Hand Squeezes " 10 reps 3 sec hold    Patient left HOB elevated with BLE offloaded, BUE elevated with pillows, tray table at bedside, all lines intact, call button in reach, bed alarm on and nurse Marni notified..    GOALS:   Multidisciplinary Problems     Physical Therapy Goals        Problem: Physical Therapy    Goal Priority Disciplines Outcome Goal Variances Interventions   Physical Therapy Goal     PT, PT/OT Ongoing, Progressing     Description: Goals to be met by: 22     Patient will increase functional independence with mobility by performin. Supine to sit with Set-up [place]  2. Rolling to Left and Right with Supervision.  3. Sit to stand transfer with Supervision  4. Gait  x 300 feet with Supervision using Rolling Walker.   5. Wheelchair propulsion x300 feet with Supervision using bilateral uppper extremities                     Time Tracking:     PT Received On:    PT Start Time: 1440     PT Stop Time: 1505  PT Total Time (min): 25 min     Billable Minutes: Gait Training 12 min and Therapeutic Exercise 13 min    Treatment Type: Treatment  PT/PTA: PTA     PTA Visit Number: 2     2022

## 2022-08-16 NOTE — PLAN OF CARE
Problem: Physical Therapy  Goal: Physical Therapy Goal  Description: Goals to be met by: 22     Patient will increase functional independence with mobility by performin. Supine to sit with Set-up [place]  2. Rolling to Left and Right with Supervision.  3. Sit to stand transfer with Supervision  4. Gait  x 300 feet with Supervision using Rolling Walker.   5. Wheelchair propulsion x300 feet with Supervision using bilateral uppper extremities    Outcome: Ongoing, Progressing   Patient ambulated ~65  feet on level tile with Rolling Walker with CGA on 4.5L/min Oxygen NC. Pt with demonstrating a  reciprocal gait with decreased sonali and decreased step length. Impairments contributing to gait deviations include impaired balance, decreased flexibility and decreased strength. Pt demo min wobbling during his gait training, had 3 LOB with turning and was able to regain his balance. Good Dynamic Sitting and Fair Dynamic Standing Balance. Pt performed Sit <> Stand x 2 trials from bed and BS Chair with SBA/CGA using RW. Pt had min SOB during gait training and a slightly drop on SpO2 level to 84% when sitting on EOB today, v/c for pursed lip breathing and SpO2 increased to 95%

## 2022-08-16 NOTE — PT/OT/SLP PROGRESS
Occupational Therapy      Patient Name:  Janusz Montgomery Jr.   MRN:  [id number]    Patient not treated today secondary to Unarousable x2 trials. Patient encountered 2nd visit (initial arrival 2:04 ->2:11 OT exited room, RN present assessing Patient). Patient OOB, resting in bedside chair. Patient not responding to verbal, tactile cues. RN notified promptly for wellness assessment. Patient deemed stable however lethargic.  OT will follow-up 08/17/22.    8/16/2022

## 2022-08-17 NOTE — PT/OT/SLP PROGRESS
Occupational Therapy      Patient Name:  Janusz Montgomery Jr.   MRN:  [id number]    Patient not seen today secondary to pt reports he just finished eating and would like to participate in afternoon. Will follow-up this PM.     8/17/2022

## 2022-08-17 NOTE — CONSULTS
Dr. Handley at bedside with patient, spouse and daughter. In depth discussion regarding discharge planning for SNF vs. HH vs. longterm placement. Awaiting therapy's final eval.     CM remained in room to further discuss discharge options. Explained in depth differences in level of care for HH vs SNF vs. longterm placement. Reviewed recommendation for hh at this time. Explain snf placement would require the recommended level of care for snf, insurance authorization and patient would need to be accepted medically by a facility. Confirmed the current plan for discharge is home with home health based on recommendation and awaiting re-eval from therapy. Pt and family not accepting to [facility] placement at this time.     Reviewed resources for  [facility] for longer term options. Emailed pt's daughter list of home health agencies. [email or web address]).

## 2022-08-17 NOTE — NURSING
Pt ready for discharge per order. PIV removed & AVS reviewed w/patient. Medication to be picked up from outpatient pharmacy. Pt leaving in stable condition with belongings in hand. No further needs at this time.

## 2022-08-17 NOTE — ASSESSMENT & PLAN NOTE
-Suspect thrombocytopenai and anemia reactive to his chemotherapy and malignancy  -No evidence of bleeding  -Not neutropenic  -Required 1 unit PRBC 8/15 with appropriate improvement in Hb  -Platelets improving and > 100  -Repeat cbc in AM.

## 2022-08-17 NOTE — PLAN OF CARE
Per PT/OT notes, recommendation for home health and wc for home, physician notified.     Referral packet sent to [facility]. Willing to accept patient.     Hospital follow up appointment scheduled and listed on avs. Pt to keep previously scheduled hem/onc appointment with Dr. Hargrove.     4:00pm Spoke with patient's daughter regarding updated therapy notes. Inform her of recommendation for home health and wc, accepting to [facility]. Reviewed follow up appointments with pt's daughter, listed on avs. Pt's daughter stated she will be bringing the patient home and will bring his home oxygen.

## 2022-08-17 NOTE — PLAN OF CARE
Problem: Adult Inpatient Plan of Care  Goal: Plan of Care Review  Outcome: Ongoing, Progressing  Goal: Optimal Comfort and Wellbeing  Outcome: Ongoing, Progressing     Problem: Fall Injury Risk  Goal: Absence of Fall and Fall-Related Injury  Outcome: Ongoing, Progressing     Problem: Infection  Goal: Absence of Infection Signs and Symptoms  Outcome: Ongoing, Progressing

## 2022-08-17 NOTE — PT/OT/SLP PROGRESS
"Physical Therapy Treatment    Patient Name:  Janusz Montgomery Jr.   MRN:  [id number]    Recommendations:     Discharge Recommendations:  home health PT   Discharge Equipment Recommendations: wheelchair   Barriers to discharge: trending medical condition    Assessment:     Janusz Montgomery Jr. is a 77 y.o. male admitted with a medical diagnosis of Sepsis.  He presents with the following impairments/functional limitations:  weakness, impaired endurance, impaired self care skills, impaired functional mobility, gait instability, impaired balance, decreased upper extremity function, decreased lower extremity function, decreased safety awareness, pain, impaired skin, edema, impaired cardiopulmonary response to activity .    Pt agreeable to work with Therapist. Patient ambulated ~80  feet on level tile with Rolling Walker with CGA/SBA on 4 L/min Oxygen NC. Pt demo improvement on his overall strength in gait training today, still had min wobbling while ambulating; v/c to slow down and to allow self corrections; had SOB and required v/c for pursed lip breathing tech; had 1 LOB with turning and was able to regain his balance. SpO2 level maintained well throughout treatment SpO2 90-97%    Rehab Prognosis: Good; patient would benefit from acute skilled PT services to address these deficits and reach maximum level of function.    Recent Surgery: * No surgery found *      Plan:     During this hospitalization, patient to be seen 5 x/week to address the identified rehab impairments via gait training, therapeutic activities, therapeutic exercises, neuromuscular re-education, wheelchair management/training and progress toward the following goals:    · Plan of Care Expires:  08/28/22    Subjective     Chief Complaint: "I think I pulled my back earlier trying to sit up this morning, feeling a little pain on the Right side of my Back."  Patient/Family Comments/goals: n/a  Pain/Comfort:  · Pain Rating 1:  (not rate)  · Location - Side 1: " Right  · Location 1: back  · Pain Addressed 1: Nurse notified      Objective:     Communicated with nurse Abdul prior to session.  Patient found HOB elevated with telemetry, oxygen upon PT entry to room.     General Precautions: Standard, airborne, contact (C-diff), respiratory  Orthopedic Precautions:N/A   Braces: N/A  Respiratory Status: Nasal cannula, flow 4 L/min     Functional Mobility:  · Transfers:     · Sit to Stand:  stand by assistance with rolling walker  · Gait: Patient ambulated ~80  feet on level tile with Rolling Walker with CGA/SBA on 4 L/min Oxygen NC. Pt with demonstrating a reciprocal gait with decreased sonali and decreased step length. Impairments contributing to gait deviations include impaired balance, decreased flexibility and decreased strength. Pt demo improvement on his overall strength in gait training today, still had min wobbling while ambulating; v/c to slow down and to allow self corrections; had SOB and required v/c for pursed lip breathing tech; had 1 LOB with turning and was able to regain his balance.   · Balance: Good Dynamic Sitting and Fair Dynamic Standing      AM-PAC 6 CLICK MOBILITY  Turning over in bed (including adjusting bedclothes, sheets and blankets)?: 4  Sitting down on and standing up from a chair with arms (e.g., wheelchair, bedside commode, etc.): 3  Moving from lying on back to sitting on the side of the bed?: 3  Moving to and from a bed to a chair (including a wheelchair)?: 3  Need to walk in hospital room?: 3  Climbing 3-5 steps with a railing?: 2  Basic Mobility Total Score: 18       Therapeutic Activities and Exercises: Pt performed ex in Seated position on EOB. Pt had min/mod edema to his BUE & BLE, educated to do AP, hand squeezes, and elevate above heart level. Pt verbalized understanding.   AP 2 x 10 reps  LAQ 2 x 10 reps  Marching 2 x 10 reps  Pillow Squeezes 2 x 10 reps 3 sec hold  Bilateral Hand Squeezes 2 x 10 reps 3 sec hold    Patient left up in  chair with handoff to OT, oxygen, all lines intact, call button in reach, nurse Chantell notified and OT present..    GOALS:   Multidisciplinary Problems     Physical Therapy Goals        Problem: Physical Therapy    Goal Priority Disciplines Outcome Goal Variances Interventions   Physical Therapy Goal     PT, PT/OT Ongoing, Progressing     Description: Goals to be met by: 22     Patient will increase functional independence with mobility by performin. Supine to sit with Set-up [place]  2. Rolling to Left and Right with Supervision.  3. Sit to stand transfer with Supervision  4. Gait  x 300 feet with Supervision using Rolling Walker.   5. Wheelchair propulsion x300 feet with Supervision using bilateral uppper extremities                     Time Tracking:     PT Received On:    PT Start Time: 1403     PT Stop Time: 1430  PT Total Time (min): 27 min     Billable Minutes: Gait Training 12 min and Therapeutic Exercise 15 min    Treatment Type: Treatment  PT/PTA: PTA     PTA Visit Number: 3     2022

## 2022-08-17 NOTE — DISCHARGE INSTRUCTIONS
Take all medications as prescribed.  Eat a strict low salt cardiac and renal diet.  Follow up with your physicians as scheduled - pcp within 1 week, Dr. Hargrove within 2 weeks, Dr. Higgins within 1 week and Nephrology within 1-2 weeks.  Thank you for trusting [facility] and Dr. Handley with your care.  We are honored that you entrusted us with your healthcare needs. Your satisfaction is very important to us and we hope you have been very pleased with your experience at [facility]. After your discharge you may receive a survey asking you to rate your hospital experience. We encourage you to take the time to complete the survey as your feedback allows us to identify areas for improvement as well as recognize our staff.   We hope that you have received the very best care possible during your hospitalization at [facility], as your satisfaction is our top priority.

## 2022-08-17 NOTE — ASSESSMENT & PLAN NOTE
-Admitted to inpatient status  -On admit tachycardic with lactic acidosis and diarrhea.  No leukocytosis  -Noted abnormal UA but no urinary symptoms and urine culture was negative  -Blood cultures negative  -C.diff Ag + but Toxin negative.  PCR resulted positive  -Stopped IV antibiotics and initiated oral vancomycin - now on day 3 of treatment  -Continue in isolation status  -Diarrhea has resolved.  -Medically stable for discharge pending NH placement.

## 2022-08-17 NOTE — ASSESSMENT & PLAN NOTE
-Patient with progressive weakness and multiple recent falls at home  -His wife is no longer able to care for him at home  -PT/OT consulted and recommended HH, but given that he cannot return home will require SNF vs intermediate placement.

## 2022-08-17 NOTE — PLAN OF CARE
Problem: Physical Therapy  Goal: Physical Therapy Goal  Description: Goals to be met by: 22     Patient will increase functional independence with mobility by performin. Supine to sit with Set-up [place]  2. Rolling to Left and Right with Supervision.  3. Sit to stand transfer with Supervision  4. Gait  x 300 feet with Supervision using Rolling Walker.   5. Wheelchair propulsion x300 feet with Supervision using bilateral uppper extremities    Outcome: Ongoing, Progressing   Patient ambulated ~80  feet on level tile with Rolling Walker with CGA/SBA on 4 L/min Oxygen NC. Pt demo improvement on his overall strength in gait training today, still had min wobbling while ambulating; v/c to slow down and to allow self corrections; had SOB and required v/c for pursed lip breathing tech; had 1 LOB with turning and was able to regain his balance. SpO2 level maintained well throughout treatment SpO2 90-97%

## 2022-08-17 NOTE — PLAN OF CARE
[facility] Surg      HOME HEALTH ORDERS  FACE TO FACE ENCOUNTER    Patient Name: Janusz Montgomery Jr.  YOB: 1945    PCP: Miguelina Weathers MD   PCP Address: [address] / [place]  PCP Phone Number: [phone]  PCP Fax: [phone]    Encounter Date: 8/10/22    Admit to Home Health    Diagnoses:  Active Hospital Problems    Diagnosis  POA    *Sepsis [A41.9]  Yes     Priority: 1 - High    Clostridium difficile infection [A49.8]  Yes     Priority: 2     Paroxysmal atrial fibrillation  [I48.0]  Yes     Priority: 3      - on ASA 325mg daily  - followed by cardiology, Dr. Sandhu  -currently on amiodarone, toprol, eliquis and plavix      Weakness [R53.1]  Yes     Priority: 4     Acute renal failure superimposed on stage 3 chronic kidney disease [N17.9, N18.30]  Yes     Priority: 5      -followed by nephrology.          Chronic respiratory failure with hypoxia [J96.11]  Yes     Priority: 6     CHARISMA on CPAP [G47.33, Z99.89]  Not Applicable     Priority: 7      -Severe ahi of 29. apap 14-16  -Using and benefiting  -we discussed at length about Respironics recall.  Patient already registered his apap.  Risk and benefits discussed.  Per patient, he had spoken with [employer] and they will replace his machine if order is written.       Squamous cell carcinoma of neck [C44.42]  Yes     Priority: 8              Pancytopenia [D61.818]  Yes     Priority: 9     Morbid obesity [E66.01]  Yes     -gaining weight despite dietary restriction.        Coronary artery disease involving native coronary artery of native heart without angina pectoris s/p RCA stent [I25.10]  Yes      Resolved Hospital Problems    Diagnosis Date Resolved POA    Abnormal urinalysis [R82.90] 08/12/2022 Yes    Urinary tract infection without hematuria [N39.0] 08/12/2022 Yes       Follow Up Appointments:  Future Appointments   Date Time Provider Department Center   8/19/2022  2:00 PM CHAIR 03 WB WB CHEMO [facility]    8/22/2022  3:30 PM Pito Pop NP [facility] FAM MED [place]   8/23/2022  2:00 PM CHAIR 04 [facility] CHEMO [facility]   8/26/2022  8:30 AM Robert Hargrove MD [facility] HEM ONC [facility]   8/26/2022  2:00 PM CHAIR 03 [facility] CHEMO [facility]   8/30/2022  1:00 PM [facility] CT1 LIMIT 400 LBS [facility] CT SCAN [facility]   10/5/2022  2:00 PM Miguelina Weathers MD [facility] FAM MED [place]   10/12/2022  2:30 PM Sandra Gary DPM [facility] POD [place]   10/13/2022  1:00 PM Benjamin Sandhu MD [facility] CARDIO [place]       Allergies:  Review of patient's allergies indicates:   Allergen Reactions    Ciprofloxacin Rash     Diffuse pruritic morbilliform rash developed 3/15/2017 after dose of cipro; previously in 2/2017 he had rash/fevers after initiation of cipro    Zosyn [piperacillin-tazobactam] Rash     Diffuse pruritic morbilliform rash developed 3/15/2017.  Then, 430am dose on 3/16 and rash worsened with SOB/tachypnea but no hypoxemia.     Bacitracin Itching and Rash     Violaceous rash in area of topical Tx.        Medications: Review discharge medications with patient and family and provide education.    Current Discharge Medication List      START taking these medications    Details   vancomycin 125 mg/5 mL Soln Take 5 mLs (125 mg total) by mouth every 6 (six) hours. for 7 days         CONTINUE these medications which have CHANGED    Details   oxybutynin (DITROPAN-XL) 5 MG TR24 Take 1 tablet (5 mg total) by mouth every evening.  Qty: 30 tablet, Refills: 11    Associated Diagnoses: Prostate cancer      sucralfate (CARAFATE) 100 mg/mL suspension Take 10 mLs (1 g total) by mouth 4 (four) times daily before meals and nightly.      venlafaxine (EFFEXOR) 75 MG tablet Take 1 tablet (75 mg total) by mouth every evening.    Associated Diagnoses: Major depressive disorder, recurrent episode, mild         CONTINUE these medications which have NOT CHANGED    Details   acyclovir (ZOVIRAX) 800 MG Tab Take 200 mg by mouth 2 (two) times daily.       amiodarone (PACERONE) 200 MG Tab Take 1 tablet (200 mg total) by mouth once daily.  Qty: 90 tablet, Refills: 3      artificial tears (ISOPTO TEARS) 0.5 % ophthalmic solution Place 1 drop into both eyes as needed.      atorvastatin (LIPITOR) 40 MG tablet Take 1 tablet (40 mg total) by mouth once daily.  Qty: 90 tablet, Refills: 1    Associated Diagnoses: Hyperlipidemia, unspecified hyperlipidemia type      clopidogreL (PLAVIX) 75 mg tablet Take 1 tablet by mouth once daily  Qty: 90 tablet, Refills: 3      dexAMETHasone (DECADRON) 4 MG Tab 4mg (1 tablet) by mouth twice a day for two days starting the day after chemo.  Repeat after each dose of chemo.  Qty: 24 tablet, Refills: 1    Associated Diagnoses: Head and neck cancer; Secondary malignant neoplasm of cervical lymph node      diphenhydrAMINE-aluminum-magnesium hydroxide-simethicone-LIDOcaine HCl 2% Swish and spit 15 mLs every 4 (four) hours as needed.      ELIQUIS 5 mg Tab Take 1 tablet by mouth twice daily  Qty: 180 tablet, Refills: 3    Associated Diagnoses: Paroxysmal atrial fibrillation      furosemide (LASIX) 20 MG tablet Take 1 tablet (20 mg total) by mouth once daily.  Qty: 90 tablet, Refills: 3    Associated Diagnoses: Edema, unspecified type      gabapentin (NEURONTIN) 100 MG capsule 2 capsules once daily  Qty: 30 capsule, Refills: 0    Associated Diagnoses: Left leg pain      gly-carb h-poly a-pot hydrox (MUGARD) Soln by Mucous Membrane route.      hydrocodone-acetaminophen (HYCET) solution 7.5-325 mg/15mL Take 15 mLs by mouth every 8 (eight) hours as needed for Pain.  Qty: 473 mL, Refills: 0    Comments: Quantity prescribed more than 7 day supply? Yes, quantity medically necessary  Associated Diagnoses: Squamous cell carcinoma of overlapping sites of skin      LIDOcaine-prilocaine (EMLA) cream Apply generously to port site 30-60 min prior to chemo and then cover with saran wrap.  Qty: 30 g, Refills: 2    Associated Diagnoses: Head and neck cancer;  Secondary malignant neoplasm of cervical lymph node      melatonin 5 mg Tab Take 10 mg by mouth nightly.      metoprolol succinate (TOPROL-XL) 25 MG 24 hr tablet Take 1 tablet by mouth once daily  Qty: 90 tablet, Refills: 3      nitroGLYCERIN (NITROSTAT) 0.4 MG SL tablet Place 1 tablet (0.4 mg total) under the tongue every 5 (five) minutes as needed for Chest pain.  Qty: 25 tablet, Refills: 11      pantoprazole (PROTONIX) 40 MG tablet Take 1 tablet (40 mg total) by mouth once daily.  Qty: 90 tablet, Refills: 3      silver sulfADIAZINE 1% (SILVADENE) 1 % cream Apply topically.      tamsulosin (FLOMAX) 0.4 mg Cap Take 1 capsule (0.4 mg total) by mouth every evening.  Qty: 90 capsule, Refills: 3    Associated Diagnoses: Urinary frequency      vitamin D 1000 units Tab Take 1 tablet (1,000 Units total) by mouth once daily.         STOP taking these medications       albuterol (VENTOLIN HFA) 90 mcg/actuation inhaler Comments:   Reason for Stopping:         omeprazole (PRILOSEC) 20 MG capsule Comments:   Reason for Stopping:         ondansetron (ZOFRAN) 8 MG tablet Comments:   Reason for Stopping:         phenylephrine/DM/acetaminop/GG (MUCINEX FAST-MAX SEVERE COLD ORAL) Comments:   Reason for Stopping:         sildenafiL (VIAGRA) 100 MG tablet Comments:   Reason for Stopping:         traZODone (DESYREL) 50 MG tablet Comments:   Reason for Stopping:                 I have seen and examined this patient within the last 30 days. My clinical findings that support the need for the home health skilled services and home bound status are the following:no   Weakness/numbness causing balance and gait disturbance due to Weakness/Debility making it taxing to leave home.  Medical restrictions requiring assistance of another human to leave home due to  Home oxygen requirement.     Diet:   cardiac diet and renal diet    Labs:  SN to perform labs:  BMP: Weekly; 3 week(s)    Referrals/ Consults  Physical Therapy to evaluate and treat.  Evaluate for home safety and equipment needs; Establish/upgrade home exercise program. Perform / instruct on therapeutic exercises, gait training, transfer training, and Range of Motion.  Occupational Therapy to evaluate and treat. Evaluate home environment for safety and equipment needs. Perform/Instruct on transfers, ADL training, ROM, and therapeutic exercises.  Speech Therapy  to evaluate and treat for  Swallowing.   to evaluate for community resources/long-range planning.  Aide to provide assistance with personal care, ADLs, and vital signs.    Activities:   ambulate in house with assistance    Nursing:   Agency to admit patient within 24 hours of hospital discharge unless specified on physician order or at patient request    SN to complete comprehensive assessment including routine vital signs. Instruct on disease process and s/s of complications to report to MD. Review/verify medication list sent home with the patient at time of discharge  and instruct patient/caregiver as needed. Frequency may be adjusted depending on start of care date.     Skilled nurse to perform up to 3 visits PRN for symptoms related to diagnosis    Notify MD if SBP > 160 or < 90; DBP > 90 or < 50; HR > 120 or < 50; Temp > 101; O2 < 88%; Other:       Ok to schedule additional visits based on staff availability and patient request on consecutive days within the home health episode.    When multiple disciplines ordered:    Start of Care occurs on Sunday - Wednesday schedule remaining discipline evaluations as ordered on separate consecutive days following the start of care.    Thursday SOC -schedule subsequent evaluations Friday and Monday the following week.     Friday - Saturday SOC - schedule subsequent discipline evaluations on consecutive days starting Monday of the following week.    For all post-discharge communication and subsequent orders please contact patient's primary care physician.     Miscellaneous   Home  Oxygen:  No change    Home Health Aide:  Nursing Twice weekly, Physical Therapy Three times weekly, Occupational Therapy Three times weekly, Speech Language Pathology Twice weekly, Medical Social Work Weekly, Respiratory Therapy Twice weekly and Home Health Aide Twice weekly    Wound Care Orders  Local wound care to radiation burn daily and prn- Cleanse with Vashe. Apply hydrogel to open wound and site of discomfort.      I certify that this patient is confined to his home and needs intermittent skilled nursing care, physical therapy, speech therapy and occupational therapy.

## 2022-08-17 NOTE — PLAN OF CARE
[facility] - Med Surg  Discharge Final Note    Patient clear to discharge from case management stand point.     Primary Care Provider: Miguelina Weathers MD    Expected Discharge Date: 8/17/2022    Final Discharge Note (most recent)     Final Note - 08/17/22 1642        Final Note    Assessment Type Final Discharge Note     Anticipated Discharge Disposition Hospice/Home   [facility]     What phone number can be called within the next 1-3 days to see how you are doing after discharge? [phone]     Hospital Resources/Appts/Education Provided Provided patient/caregiver with written discharge plan information;Appointments scheduled and added to AVS        Post-Acute Status    Post-Acute Authorization Home Health     Home Health Status Set-up Complete/Auth obtained     Coverage [employer] Managed medicare     Discharge Delays None known at this time                 Important Message from Medicare  Important Message from Medicare regarding Discharge Appeal Rights: Given to patient/caregiver, Explained to patient/caregiver, Signed/date by patient/caregiver     Date IMM was signed: 08/15/22  Time IMM was signed: 1215    Contact Info     Miguelina Weathers MD   Specialty: Internal Medicine, Pediatrics   Relationship: PCP - General  Hypertension Digital Medicine Responsible Provider  Hyperlipidemia Digital Medicine Responsible Provider    [address] [facility]  [place] [address]   Phone: [phone]       Next Steps: Follow up in 1 week(s)    Etta Higgins MD   Specialty: Otolaryngology    [address]  [place] [address]   Phone: [phone]       Next Steps: Schedule an appointment as soon as possible for a visit in 1 week(s)    Instructions: Clinic to contact patient to schedule appointment    [address]  [facility] [address]   Phone: [phone]       Next Steps: Follow up    Instructions: Home Health    [facility]   Specialty: DME Provider    [address] [place] [address] A  [facility]  [place] [address]   Phone: [phone]       Next Steps: Follow up    Instructions: DME: Wheelchair

## 2022-08-17 NOTE — TELEPHONE ENCOUNTER
Called back. They are requesting an appt. As directed by hospitalist. Will have MA call to schedule.   ----- Message from Yuly Fenton MA sent at 8/17/2022 12:08 PM CDT -----  Contact: [phone] Smith Torres  Please advise  ----- Message -----  From: Kristen Parham MA  Sent: 8/17/2022  11:59 AM CDT  To: [facility] Staff    Patient daughter is calling, father was admitted. She requesting the doctor take a look at the labs and give her a call. Also, requesting to be seen by the doctor. Please advise.

## 2022-08-17 NOTE — PT/OT/SLP PROGRESS
Occupational Therapy   Treatment    Name: Janusz Montgomery Jr.  MRN: [id number]  Admitting Diagnosis:  Sepsis       Recommendations:     Discharge Recommendations: home health OT (w/ family support)  Discharge Equipment Recommendations:  wheelchair (for safe community mobility/long distances)  Barriers to discharge:  None    Assessment:     Janusz Montgomery Jr. is a 77 y.o. male with a medical diagnosis of Sepsis.  Performance deficits affecting function are weakness, impaired endurance, impaired self care skills, gait instability, impaired balance, decreased upper extremity function, decreased lower extremity function, impaired functional mobility, decreased safety awareness, impaired cardiopulmonary response to activity, impaired skin, edema.     Pt very pleasant and willing to participate in tx session this date; pt tolerated seated BUE AROM/TE this date w/ rest breaks as needed for increasing UB strength and cardiovascular endurance. Pt ambulated functional distances in room for multiple trials (~60 ft) w/ SBA and use of RW; pt w/ mild SOB but tolerated well w/ standing rest as needed; SPO2 >92% throughout on 4L O2 NC. Pt tolerated tx session well and is progressing well towards goals; pt will benefit from skilled acute OT services to maximize functional capacity for safe performance w/ ADLs and functional mobility.     Rehab Prognosis:  Good; patient would benefit from acute skilled OT services to address these deficits and reach maximum level of function.       Plan:     Patient to be seen 5 x/week to address the above listed problems via self-care/home management, therapeutic exercises, therapeutic activities  · Plan of Care Expires: 08/28/22  · Plan of Care Reviewed with: patient    Subjective     Pain/Comfort:  Pain Rating 1: 0/10  Pain Rating Post-Intervention 1: 0/10    Objective:     Communicated with: Nurse prior to session.  Patient found up in chair with telemetry, oxygen upon OT entry to room.    General  Precautions: Standard, airborne, contact   Orthopedic Precautions:N/A   Braces: N/A  Respiratory Status: Nasal cannula, flow 4 L/min     Occupational Performance:     Bed Mobility:    · Pt found/left in chair     Functional Mobility/Transfers:  · Patient completed Sit <> Stand Transfer x 1 trial from EOB with stand by assistance  with  rolling walker   · Functional Mobility: Pt was able to ambulate functional distances in room for multiple trials w/ SBA and use of RW; pt required x 2 brief rest breaks but tolerated well w/o LOB.     Activities of Daily Living:  · Upper Body Dressing: minimum assistance for donning gown over back   · Lower Body Dressing: dependence for doffing socks    [facility] 6 Click ADL: 21    Treatment & Education:  -Pt re-educated on role of OT and POC.   -Pt educated on safe functional mobility.   -Pt performed the following for 3 sets x 5 reps w/ emphasis on taking rest breaks in between for catching breath and implementing PLB; pt performed the following w/ use of 2lb wrist weight in each UE:    -elbow flex   -shoulder abd/add (w/ elbow flexed)   -shoulder flexion    -forearm supination/pronation    -forward punches   -Questions and concerns addressed within OT scope.       Patient left up in chair with all lines intact and call button in reachEducation:      GOALS:   Multidisciplinary Problems     Occupational Therapy Goals        Problem: Occupational Therapy    Goal Priority Disciplines Outcome Interventions   Occupational Therapy Goal     OT, PT/OT Ongoing, Progressing    Description: Goals to be met by: 8/28/22    Patient will increase functional independence with ADLs by performing:    LE Dressing with Modified [place].  Grooming while standing at sink with Modified [place].  Toileting from toilet with Modified [place] for hygiene and clothing management.   Supine to sit with Modified [place].  Step transfer with Modified [place]  Toilet transfer to toilet with  Modified [place].  Upper extremity exercise program x15 reps per handout, with independence.  Implementation of PLB and energy conservation strategies into daily routines w/ (I).                      Time Tracking:     OT Date of Treatment: 08/17/22  OT Start Time: 1432  OT Stop Time: 1504  OT Total Time (min): 32 min    Billable Minutes:Therapeutic Activity 18  Therapeutic Exercise 14  Total Time 32    OT/COSTA: OT          8/17/2022

## 2022-08-17 NOTE — SUBJECTIVE & OBJECTIVE
Interval History: No acute events overnight.  States he felt disoriented and weak this morning.  No further diarrhea.  While PT/OT recommend HH, lengthy discussion with patient and his daughter reveal multiple falls at home and that his wife is not able to care for him any longer.  Will need to pursue SNF vs [facility] placement.  Patient in agreement.    Review of Systems   HENT:  Negative for ear discharge and ear pain.    Eyes:  Negative for discharge and itching.   Gastrointestinal: Negative.    Endocrine: Negative for cold intolerance and heat intolerance.   Neurological:  Positive for weakness. Negative for seizures and syncope.   Psychiatric/Behavioral:  Positive for confusion.    Objective:     Vital Signs (Most Recent):  Temp: 98.1 °F (36.7 °C) (08/16/22 1549)  Pulse: 60 (08/16/22 1549)  Resp: 20 (08/16/22 1549)  BP: 128/68 (08/16/22 1549)  SpO2: 99 % (08/16/22 1549)   Vital Signs (24h Range):  Temp:  [97.7 °F (36.5 °C)-98.2 °F (36.8 °C)] 98.1 °F (36.7 °C)  Pulse:  [56-75] 60  Resp:  [18-20] 20  SpO2:  [94 %-100 %] 99 %  BP: (110-166)/(51-70) 128/68     Weight: (!) 146.7 kg (323 lb 6.6 oz)  Body mass index is 42.67 kg/m².    Intake/Output Summary (Last 24 hours) at 8/16/2022 1908  Last data filed at 8/16/2022 1800  Gross per 24 hour   Intake 720 ml   Output 1550 ml   Net -830 ml        Physical Exam  Constitutional:       General: He is not in acute distress.     Appearance: He is not ill-appearing, toxic-appearing or diaphoretic.   HENT:      Head: Normocephalic and atraumatic.      Right Ear: External ear normal.      Left Ear: External ear normal.      Nose: Nose normal.      Mouth/Throat:      Mouth: Mucous membranes are moist.      Pharynx: No oropharyngeal exudate or posterior oropharyngeal erythema.   Eyes:      Extraocular Movements: Extraocular movements intact.      Conjunctiva/sclera: Conjunctivae normal.   Cardiovascular:      Rate and Rhythm: Normal rate and regular rhythm.   Pulmonary:       Effort: Pulmonary effort is normal. No respiratory distress.      Breath sounds: Normal breath sounds.   Abdominal:      General: Bowel sounds are normal.      Palpations: Abdomen is soft.   Musculoskeletal:      Right lower leg: Edema present.      Left lower leg: Edema present.   Skin:     Comments: Scaly erythema around neckline.  Erythema to bilateral lower extremities.  No warmth.   Neurological:      Mental Status: He is alert and oriented to person, place, and time.      Cranial Nerves: No cranial nerve deficit.      Comments: Diffusely weak   Psychiatric:         Mood and Affect: Mood normal.     Significant Labs: All pertinent labs within the past 24 hours have been reviewed.    Significant Imaging: I have reviewed all pertinent imaging results/findings within the past 24 hours.

## 2022-08-17 NOTE — ASSESSMENT & PLAN NOTE
Body mass index is 42.67 kg/m². Morbid obesity complicates all aspects of disease management from diagnostic modalities to treatment. Weight loss encouraged and health benefits explained to patient.

## 2022-08-17 NOTE — ASSESSMENT & PLAN NOTE
-Baseline Creat around 1.5.  -On admit Cr 2.3  -Avoid nephrotoxic agents and renally dose meds  -Patient is at least 5 L fluid positive since admit and Cr remains essentially stable  -Resume home oral lasix  -Repeat BMP in AM.

## 2022-08-17 NOTE — PLAN OF CARE
Per Radha with [facility], patient will require a heavy duty wc and will be delivered to patient's home today.     Confirmed with therapy, patient can safely discharge home with wc planned for delivery.

## 2022-08-17 NOTE — ASSESSMENT & PLAN NOTE
-History and treatment reviewed  -Follows with Dr. Hargrove outpatient  -Noted last carboplatin 8/1  -Heme/onc consulted and input appreciated

## 2022-08-17 NOTE — ASSESSMENT & PLAN NOTE
-Patient with Paroxysmal (<7 days) atrial fibrillation which is controlled currently with Amiodarone.   -Patient is currently in sinus rhythm.[id number] Score: 3.   Anticoagulation indicated. Anticoagulation done with apixaban

## 2022-08-17 NOTE — CONSULTS
[facility] willing to accept patient, plan of care sent via [employer]. Hospital follow up for pcp scheduled and listed on avs. Pt to keep previously scheduled hem/onc appt with Dr Hargrove. Message sent for clinic to contact patient with Dr. Higgins with ENT within 1 week. [facility] referred, per physician.

## 2022-08-17 NOTE — ASSESSMENT & PLAN NOTE
-Patient with chronic hypoxemic respiratory failure on home oxygen.  -Seems to be at baseline.  -Continue supplemental O2

## 2022-08-17 NOTE — PLAN OF CARE
Problem: Occupational Therapy  Goal: Occupational Therapy Goal  Description: Goals to be met by: 8/28/22    Patient will increase functional independence with ADLs by performing:    LE Dressing with Modified [place].  Grooming while standing at sink with Modified [place].  Toileting from toilet with Modified [place] for hygiene and clothing management.   Supine to sit with Modified [place].  Step transfer with Modified [place]  Toilet transfer to toilet with Modified [place].  Upper extremity exercise program x15 reps per handout, with independence.  Implementation of PLB and energy conservation strategies into daily routines w/ (I).     Outcome: Ongoing, Progressing    Pt very pleasant and willing to participate in tx session this date; pt tolerated seated BUE AROM/TE this date w/ rest breaks as needed for increasing UB strength and cardiovascular endurance. Pt ambulated functional distances in room for multiple trials (~60 ft) w/ SBA and use of RW; pt w/ mild SOB but tolerated well w/ standing rest as needed; SPO2 >92% throughout on 4L O2 NC. Pt tolerated tx session well and is progressing well towards goals; pt will benefit from skilled acute OT services to maximize functional capacity for safe performance w/ ADLs and functional mobility.

## 2022-08-17 NOTE — PROGRESS NOTES
[facility]  Progress Note    Patient Name: Janusz Montgomery Jr.  MRN: [id number]  Patient Class: IP- Inpatient   Admission Date: 8/10/2022  Length of Stay: 5 days  Attending Physician: Al Handley MD  Primary Care Provider: Miguelina Weathers MD        Subjective:     Principal Problem:Sepsis        HPI:  No notes on file    Overview/Hospital Course:  78 y/o male with hx of head and neck cancer on chemo treatment presents with progressive weakness.  Noted to be tachycardic and with elevated lactic acid.  Initially admitted with possible sepsis and started on broad spectrum ABx's.  Also with dehydration and started on IVF hydration.  No evidence of infection and ABx's stopped.  Noted to have decreasing H/H.  Recent chemo and Heme/Onc consulted.  Also noted to have increase in Creat.  Creat higher than baseline, but stable and patient with adequate urine output.  Patient complained of loose stool prior to admit.  Minimal bowel movements during hospital stay, but Cdiff PCR collected on admit is positive.  Started on oral Vanc.  Hgb down to 6.8 and patient complaining of severe fatigue.      Interval History: No acute events overnight.  States he felt disoriented and weak this morning.  No further diarrhea.  While PT/OT recommend HH, lengthy discussion with patient and his daughter reveal multiple falls at home and that his wife is not able to care for him any longer.  Will need to pursue SNF vs [facility] placement.  Patient in agreement.    Review of Systems   HENT:  Negative for ear discharge and ear pain.    Eyes:  Negative for discharge and itching.   Gastrointestinal: Negative.    Endocrine: Negative for cold intolerance and heat intolerance.   Neurological:  Positive for weakness. Negative for seizures and syncope.   Psychiatric/Behavioral:  Positive for confusion.    Objective:     Vital Signs (Most Recent):  Temp: 98.1 °F (36.7 °C) (08/16/22 1549)  Pulse: 60 (08/16/22 1549)  Resp: 20  (08/16/22 1549)  BP: 128/68 (08/16/22 1549)  SpO2: 99 % (08/16/22 1549)   Vital Signs (24h Range):  Temp:  [97.7 °F (36.5 °C)-98.2 °F (36.8 °C)] 98.1 °F (36.7 °C)  Pulse:  [56-75] 60  Resp:  [18-20] 20  SpO2:  [94 %-100 %] 99 %  BP: (110-166)/(51-70) 128/68     Weight: (!) 146.7 kg (323 lb 6.6 oz)  Body mass index is 42.67 kg/m².    Intake/Output Summary (Last 24 hours) at 8/16/2022 1908  Last data filed at 8/16/2022 1800  Gross per 24 hour   Intake 720 ml   Output 1550 ml   Net -830 ml        Physical Exam  Constitutional:       General: He is not in acute distress.     Appearance: He is not ill-appearing, toxic-appearing or diaphoretic.   HENT:      Head: Normocephalic and atraumatic.      Right Ear: External ear normal.      Left Ear: External ear normal.      Nose: Nose normal.      Mouth/Throat:      Mouth: Mucous membranes are moist.      Pharynx: No oropharyngeal exudate or posterior oropharyngeal erythema.   Eyes:      Extraocular Movements: Extraocular movements intact.      Conjunctiva/sclera: Conjunctivae normal.   Cardiovascular:      Rate and Rhythm: Normal rate and regular rhythm.   Pulmonary:      Effort: Pulmonary effort is normal. No respiratory distress.      Breath sounds: Normal breath sounds.   Abdominal:      General: Bowel sounds are normal.      Palpations: Abdomen is soft.   Musculoskeletal:      Right lower leg: Edema present.      Left lower leg: Edema present.   Skin:     Comments: Scaly erythema around neckline.  Erythema to bilateral lower extremities.  No warmth.   Neurological:      Mental Status: He is alert and oriented to person, place, and time.      Cranial Nerves: No cranial nerve deficit.      Comments: Diffusely weak   Psychiatric:         Mood and Affect: Mood normal.     Significant Labs: All pertinent labs within the past 24 hours have been reviewed.    Significant Imaging: I have reviewed all pertinent imaging results/findings within the past 24  hours.      Assessment/Plan:      * Sepsis  -Admitted to inpatient status  -On admit tachycardic with lactic acidosis and diarrhea.  No leukocytosis  -Noted abnormal UA but no urinary symptoms and urine culture was negative  -Blood cultures negative  -C.diff Ag + but Toxin negative.  PCR resulted positive  -Stopped IV antibiotics and initiated oral vancomycin - now on day 3 of treatment  -Continue in isolation status  -Diarrhea has resolved.  -Medically stable for discharge pending NH placement.    Clostridium difficile infection  -Treatment as above.    Paroxysmal atrial fibrillation   -Patient with Paroxysmal (<7 days) atrial fibrillation which is controlled currently with Amiodarone.   -Patient is currently in sinus rhythm.[id number] Score: 3.   Anticoagulation indicated. Anticoagulation done with apixaban    Weakness  -Patient with progressive weakness and multiple recent falls at home  -His wife is no longer able to care for him at home  -PT/OT consulted and recommended HH, but given that he cannot return home will require SNF vs [facility] placement.    Acute renal failure superimposed on stage 3 chronic kidney disease  -Baseline Creat around 1.5.  -On admit Cr 2.3  -Avoid nephrotoxic agents and renally dose meds  -Patient is at least 5 L fluid positive since admit and Cr remains essentially stable  -Resume home oral lasix  -Repeat BMP in AM.    Chronic respiratory failure with hypoxia  -Patient with chronic hypoxemic respiratory failure on home oxygen.  -Seems to be at baseline.  -Continue supplemental O2    CHARISMA on CPAP  -Continue Nightly CPAP    Squamous cell carcinoma of neck  -History and treatment reviewed  -Follows with Dr. Hargrove outpatient  -Noted last carboplatin 8/1  -Heme/onc consulted and input appreciated    Pancytopenia  -Suspect thrombocytopenai and anemia reactive to his chemotherapy and malignancy  -No evidence of bleeding  -Not neutropenic  -Required 1 unit PRBC 8/15 with appropriate improvement  in Hb  -Platelets improving and > 100  -Repeat cbc in AM.    Morbid obesity  Body mass index is 42.67 kg/m². Morbid obesity complicates all aspects of disease management from diagnostic modalities to treatment. Weight loss encouraged and health benefits explained to patient.     Coronary artery disease involving native coronary artery of native heart without angina pectoris s/p RCA stent  -History noted  -Chest pain free.  -Continue plavix, metoprolol and statin.      VTE Risk Mitigation (From admission, onward)         Ordered     apixaban tablet 5 mg  2 times daily         08/11/22 0409     IP VTE HIGH RISK PATIENT  Once         08/11/22 0409     Place sequential compression device  Until discontinued         08/11/22 0409                Discharge Planning   KIARA:      Code Status: Full Code   Is the patient medically ready for discharge?:     Reason for patient still in hospital (select all that apply): PT / OT recommendations and Pending disposition  Discharge Plan A: Home Health   Discharge Delays: None known at this time              Al Handley MD  Department of Hospital Medicine   [facility] - Med Surg

## 2022-08-17 NOTE — PLAN OF CARE
Problem: Adult Inpatient Plan of Care  Goal: Plan of Care Review  Outcome: Ongoing, Progressing  Goal: Patient-Specific Goal (Individualized)  Outcome: Ongoing, Progressing  Goal: Absence of Hospital-Acquired Illness or Injury  Outcome: Ongoing, Progressing  Goal: Optimal Comfort and Wellbeing  Outcome: Ongoing, Progressing  Goal: Readiness for Transition of Care  Outcome: Ongoing, Progressing     Pt resting comfortably in bed with call light in hand.

## 2022-08-18 NOTE — TELEPHONE ENCOUNTER
----- Message from ElieserDesi Benny sent at 8/18/2022  3:30 PM CDT -----  Regarding: Call  Type: Patient Call Back    Who called:Patient / Radha Torres- Lino Daughter [phone]    What is the request in detail: Patient is requesting a call back. He needs to be seen asap. Hospital provider suggested to be seen as soon as possible. (Within 2 weeks). Please advise.    Can the clinic reply by [facility]? No    Would the patient rather a call back or a response via [facility]? Call    Best call back number: [phone]    Additional Information:    Thanks

## 2022-08-18 NOTE — TELEPHONE ENCOUNTER
Spoke with Radha in regards to patient's appointment. Patient is schedule with Dr Higgins on 9/19. [place] per dr Higgins

## 2022-08-19 NOTE — TELEPHONE ENCOUNTER
Patient had surgery May 4 for cancerous tumor in his neck. Was dehydrated last week and hospitalized for dehydration. Cg stated pt was blowing his nose today and big clot came out. Pt is currently still bleeding. Tissue currently in his nostril. Pt instructed on how to hold his nose and apply pressure. Pt is also taking blood thinners. Care advice recommend pt see MD today. Cg offered and accepted ready responders. Please call cg with any additional care advice. [phone]    Reason for Disposition   Taking Coumadin (warfarin) or other strong blood thinner, or known bleeding disorder (e.g., thrombocytopenia)    Additional Information   Negative: Fainted (passed out), or too weak to stand following large blood loss   Negative: Sounds like a life-threatening emergency to the triager   Negative: Bleeding present > 30 minutes and using correct method of direct pressure   Negative: Bleeding now and second call after being instructed in correct technique of direct pressure   Negative: Lightheadedness or dizziness   Negative: Pale skin (pallor) of new-onset or worsening   Negative: Has nasal packing (inserted by health care provider to control bleeding) and now has new rash   Negative: Has nasal packing and now has bleeding around the packing (Exception: few drops or ooze)   Negative: Patient sounds very sick or weak to the triager   Negative: Large amount of blood has been lost (e.g., one cup)   Negative: Bleeding recurs 3 or more times in 24 hours despite direct pressure    Protocols used: NOSEBLEED-A-OH

## 2022-08-22 NOTE — Clinical Note
Hello, [place] pt. Family is concerned he was discharged home inappropriately from recent hospitalization. I think he is waiting for SNF placement. No need to admit from nephro perspective, but they are concerned about ability to care for him and prevent falls at home. Just wanted to loop you in.  Best, SR

## 2022-08-22 NOTE — PROGRESS NOTES
Subjective:       Patient ID: Janusz Montgomery Jr. is a 77 y.o. white male who presents for follow-up evaluation of   No chief complaint on file.      HPI     Patient seen by me March 2021.  Last seen by Dr. Moctezuma in December 2017; lost to follow up since then. He was followed by Dr. 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 Moctezuma.       Patient presents today with wife for f/u of CKD.  Baseline creatinine of 1.5-1.6 mg/dL since Feb 2020; prior to that, it was 1.01-1.2 mg/dL.  Appears that patient had NAT in Feb 2020 that never returned to baseline. Elevated sCr initially noted on 2/11; patient had cardiac cath on 2/14 and was stented.      Has many questions about low K diet today.  Home SBPs on digital HTN: 150s.     Significant hx includes HLD, CAD s angina, HTN, skin ulcer, BCC, sepsis, AKIs, afib     The patient denies taking NSAIDs; no recent hospitaliztions.     Significant family hx includes: alzheimer's, HTN, skin ca, lung ca, PVD. No known renal disease.     Last renal US: 8/2020, reviewed.    Update 12/13/21:  Last seen by me June 2021.  Returns for f/u of CKD.  Baseline sCr: 1.3-1.5 mg/dL.  Home BPs: 130-150/50-60s on digital monitoring.  Was on Bactrim in September.   Has many questions about diet.  Says he has had excessive thirst his whole life.    Update 6/6/22:  Returns for f/u of CKD. Presents with step daughter,  Radha. Wife is on the phone.  Olmesartan restarted at last visit at 5 mg.    Baseline sCr appears to be 1.4-1.5 with occasional increases to 1.8-1.9. Most recent sCr was 1.9.  Home BPs: Mostly 100s-130s/40-60s on digital monitoring prior to taking medication, but he has notably had SBP 90s- low 100s at recent appointments    Had radical neck dissection on 5/4/22 - 5/11/22 for lymphadenopathy/ head and neck cancer thought to be 2/2 previous skin cancer. Had issues with breathing which required prolonged hospital stay. Planning for chemoradiation; being seen by  "Todd Clarke MD, Dr. Moser, but has not started yet. Had PET scan on Friday.   They have had many questions about cardiac and renal diet during and since admission for surgery.    He was admitted with afib RVR on 5/13/22 at [facility]. HR was 130-170s c hypotension.  Hospital f/u c Dr. Pop was 5/18. Per her note, he was started on amiodarone.    5/25/22 - seen at urgent care with orthostatic dizziness. Says he had not been drinking as much fluid the few days prior to presentation. Encouraged to increase PO fluid intake and take meclizine. Also was given doxycycline for rash to nose.    Update 8/22/22:  Returns for hospital follow-up. Presents with step daughter,  Radha. Wife also present. Unfortunately there was traffic, and they arrived 30 minutes late to a 30 minute appointment. Seen at the end of morning clinic.    Recently hospitalized. From HPI: "77 y.o. male PMHx CHF EF 65%, CKD, BCC s/p chemo presents with moderate to severe generalized weakness and profuse diarrhea x 1 day. Patient states he could "barely get off the toilet." Symptoms associated with nausea and nonbloody emesis.  Patient has also noted some skin changes around his neck.  He sometimes has changes around his neck and chest after chemo. Last chemotherapy was Monday 1 week ago.  Wife states that he has been very difficult to care for secondary to his worsening weakness.  Rest of history limited by critical illness."    From last progress note: "76 y/o male with hx of head and neck cancer on chemo treatment presents with progressive weakness.  Noted to be tachycardic and with elevated lactic acid.  Initially admitted with possible sepsis and started on broad spectrum ABx's.  Also with dehydration and started on IVF hydration.  No evidence of infection and ABx's stopped.  Noted to have decreasing H/H.  Recent chemo and Heme/Onc consulted.  Also noted to have increase in Creat.  Creat higher than baseline, but stable and patient with " "adequate urine output.  Patient complained of loose stool prior to admit.  Minimal bowel movements during hospital stay, but Cdiff PCR collected on admit is positive.  Started on oral Vanc.  Hgb down to 6.8 and patient complaining of severe fatigue."    Diarrhea resolved prior to discharge. Discharged to home while waiting for placement to at SNF.    Baseline sCr appears to be labile at 1.5-1.9 mg/dL. Trended up to 2.3 -->2.6 during admission.   Home BPs: okay on digital monitoring recently.    Reports right hand/arm swelling since Friday that has not gone down.  Nosebleed on Friday "real bad." They discussed with ENT over phone. [facility] came to house.  They are concerned that his wife is not able to care for him at home due to his weakness. They are very concerned about the BLE edema. They are taking daily weights. Report weight of 318 on Friday; 314 today (on their home scale). Patient does not believe he is having same urinary response to lasix as he previously did.  Daughter reports that home  and  reported surprise that he was discharged home. They would like him to be readmitted since they are having trouble taking care of him at home.      Review of Systems   Respiratory: Positive for shortness of breath.    Cardiovascular: Positive for leg swelling (occassionally).   Gastrointestinal: Negative for diarrhea, nausea and vomiting.   Genitourinary: Negative for difficulty urinating, dysuria and hematuria.   Neurological: Positive for dizziness and light-headedness.   Wife reports redness from radiation/ over port is spreading.       Objective:       Blood pressure 119/64, weight (!) 146.7 kg (323 lb 6.6 oz), SpO2 (!) 93 %.  Physical Exam  Constitutional:       Appearance: Normal appearance. He is obese.   Cardiovascular:      Rate and Rhythm: Normal rate and regular rhythm.      Heart sounds: No murmur heard.    No gallop.   Pulmonary:      Effort: Pulmonary effort is normal. No " respiratory distress.      Breath sounds: No wheezing. Mild rales bilateral lungs.  On 4 L nasal cannula  Abdominal:      Tenderness: There is no right CVA tenderness or left CVA tenderness.   Musculoskeletal:   Edema to right arm     Right lower leg: +1 pedal edema; +2 edema to shin      Left lower leg: +1 pedal edema; +2 edema to shin   Neurological:      Mental Status: He is alert.   Skin - reddened area to right upper chest; reports this is from radiation. Skin does not feel concerningly warm over that area.  Psychiatric:         Mood and Affect: Mood normal.         Behavior: Behavior normal.         Thought Content: Thought content normal.         Judgment: Judgment normal.           Lab Results   Component Value Date    CREATININE 1.8 (H) 08/22/2022     Prot/Creat Ratio, Urine   Date Value Ref Range Status   06/02/2022 0.07 0.00 - 0.20 Final   12/01/2021 0.15 0.00 - 0.20 Final   06/11/2021 0.20 0.00 - 0.20 Final     Lab Results   Component Value Date     08/22/2022    K 3.6 08/22/2022    CO2 34 (H) 08/22/2022    CL 99 08/22/2022     Lab Results   Component Value Date    .7 (H) 07/22/2022    CALCIUM 8.9 08/22/2022    PHOS 2.9 08/22/2022     Lab Results   Component Value Date    HGB 7.4 (L) 08/22/2022    WBC 6.06 08/22/2022    HCT 22.1 (L) 08/22/2022      Lab Results   Component Value Date    HGBA1C 5.6 02/11/2020     (L) 08/22/2022    BUN 13 08/22/2022     Lab Results   Component Value Date    LDLCALC 96.0 01/10/2022         Assessment:       1. NAT (acute kidney injury)    2. Edema, unspecified type    3. Stage 3 chronic kidney disease, unspecified whether stage 3a or 3b CKD    4. Orthostatic dizziness    5. Secondary hyperparathyroidism    6. History of hyperkalemia    7. Morbid obesity with BMI of 40.0-44.9, adult    8. Hypertension, unspecified type    9. Anemia, unspecified type    10. Pancytopenia        Plan:   CKD stage 3B c eGFR 42-49 mL/min c superimposed NAT - likely 2/2 HTN  nephrosclerosis and atherosclerotic disease, and age-related nephron loss. Recently with NAT possibly initially due to volume depletion 2/2 C.diff diarrhea. Subsequently became volume overloaded. sCr was trending up at discharge. Needs repeat labs.  At this point, there reason from a nephrology standpoint to recommend admission.    Moderate risk of progression to ESRD.    Uroscopy 8/22/22: Rare granular cast. 1 RBC seen. [place] overall.    UPCR Non-proteinuric.    Acid-base WNL.   Renal osteodystrophy Ca okay; phos okay. PTH elevated.    Anemia Hgb low. Pancytopenia thought to be 2/2 chemotherapy.  Defer to hem/onc.   DM N/a.   Lipid Management On statin per PCP.   ESRD planning Anticipatory guidance provided about timing of dialysis. Start discussions and planning when eGFR is about 20 mL/min (outside setting of NAT); most patients start dialysis between 5-10 mL/min.      HTN - WNL on amiodarone 200 mg BID, furosemide 20 mg, metoprolol succinate 25 mg  -olmesartan 5 mg previously d/cu due to NAT and hypotension and orthostatic dizziness.     H/o Hyperkalemia - WNL recently. On furosemide 20 mg. Has been on low K diet.    Edema - Moderate to legs today. Increase lasix to 40 mg daily; advised that increased dose could lower BP further. Reported that he gets up slowly to avoid dizziness.  Low Na diet; elevate legs  - Continue daily weights.  - Right arm edema - recommend f/u with PCP    All questions patient had were answered.  Asked if further questions. None. F/u in clinic in 2 weeks  with labs and urine prior to next visit or sooner if needed.  ER for emergency concerns.     [facility]vin pt; enrolled March 2021. (lost card)    Summary of Plan:  1. CBC, RFP, UPCR  2. avoid NSAID/ bactrim/ IV contrast/ gadolinium/ aminoglycoside where possible  3. RTC in 2 weeks    77 minutes of total time spent on the encounter, which includes face to face time and non-face to face time preparing to see the patient (eg, review of tests),  Obtaining and/or reviewing separately obtained history, documenting clinical information in the electronic or other health record, independently interpreting results (not separately reported) and communicating results to the patient/family/caregiver, or Care coordination (not separately reported).

## 2022-08-23 NOTE — TELEPHONE ENCOUNTER
Tc to pt  Spoke w/ dgt rj  Advsied her that Dr Hargrove request they present to ER to have port evaluated now  SShe acknowledged understanding and stated she will go now

## 2022-08-23 NOTE — TELEPHONE ENCOUNTER
Left VM informing patient that he has Paroxsymal Atrial fibrillation with RVR and to follow up with cardiology as recommended alternatively we have advised him to go to ER if symptomatic. Informed by nephrology that patient's oxygen saturation is low normal despite high flow oxygen, recommend pt obtains ABG for further workup and follow up after test to consider alternative PAP therapy if patient hypercapnic.

## 2022-08-23 NOTE — TELEPHONE ENCOUNTER
----- Message from Aaliyah Montgomery sent at 8/23/2022  1:13 PM CDT -----      Name of Who is Calling: JACKIE MONTGOMERY JR. [[id number]]      What is the request in detail: Pt stepdaughter called to speak with the office regarding pt.Please contact to further discuss and advise.          Can the clinic reply by [facility]: N      What Number to [facility]: Radha [phone]

## 2022-08-23 NOTE — TELEPHONE ENCOUNTER
----- Message from Libby Lambert sent at 8/22/2022  1:39 PM CDT -----  Regarding: Daughter [phone]  Type: Patient Call Back    Who called: step daughter Radha    What is the request in detail: was just released from hospital [facility] after he came home, home health nurse and  came out and told them he shouldn't have been released from hospital and she would like to see what to do.     Can the clinic reply by [facility]? no    Would the patient rather a call back or a response via [facility]?  Call back    Best call back number: [phone]

## 2022-08-23 NOTE — TELEPHONE ENCOUNTER
I spoke with patient's daughter  His port was placed at [facility] by IR  She denies any temp or drainage at this time   She said this has been like this X 3 days She sent a picture per attachment for you to evaluate  Please advise if you need me to do anything  Thanks Rachel  ===View-only below this line===  ----- Message -----  From: Elizabeth Lackey RN  Sent: 8/23/2022   1:17 PM CDT  To: Rachel Lawrence RN  Subject: Port site                                        ----- Message from Elizabeth Lackey RN sent at 8/23/2022  1:17 PM CDT -----       ----- Message from Janusz Montgomery Jr. to Robert Hargrove MD sent at 8/23/2022  1:15 PM -----   He went to [facility] & had the procedure done. He was given anesthesia. I assume a surgeon did it. It was done in IR.       ----- Message -----       From:Nurse Rachel       Sent:8/23/2022  8:52 AM CDT         To:Janusz Montgomery Jr.    Subject:Port site    Who placed his port  Was it placed by the surgeon or in radiology?  Thanks Rachel Lawrence RN      ----- Message -----       From:Janusz Montgomery Jr.       Sent:8/22/2022 11:09 PM CDT         To:Robert Hargrove MD    Subject:Port site    This is a picture of Janusz Montgomery's port site. It's very red & has bumps around the site. Is this normal? We gave an appointment with you Friday, August 26th.

## 2022-08-23 NOTE — TELEPHONE ENCOUNTER
Patient is going to ER, spoke with patient's daughter. She states that she will call once patient is released from ER.

## 2022-08-24 PROBLEM — I50.9 ACUTE DECOMPENSATED HEART FAILURE: Status: ACTIVE | Noted: 2022-01-01

## 2022-08-24 PROBLEM — T80.219A INFECTION OF VENOUS ACCESS PORT: Status: ACTIVE | Noted: 2022-01-01

## 2022-08-24 NOTE — HPI
Janusz Montgomery is a 77 y.o. 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 Follows with Dr. Hargrove. Completed chemoradiation therapy on 8/1/22. Reports some redness to right neck which he attributes to radiation. However, has since developed redness, swelling, and tenderness overlying right chest port concerning for possible infection over last 2-3 days. Denies fever or chills. Increasing edema in extremities worse in RUE over last 4-5 days and generalized weakness causing difficulty ambulating. Recently hospitalized with dehydration, NAT, weakness, C dif. Completed vancomycin PO 7 days and diarrhea resolved. Rehydrated and concern that he then became overloaded. Cr uptrending on discharge. Recently saw nephro 8/22 for hospital f/u, noted worsening Cr baseline and increased Lasix to 40 mg daily.

## 2022-08-24 NOTE — PROGRESS NOTES
" Admit Assessment    Patient Identification  Janusz Montgomery Jr.   :  1945  Admit Date:  2022  Attending Provider:  Mirlande Gupta MD              Referral:   Pt was admitted to  with a diagnosis of Infection of venous access port, and was admitted this hospital stay due to Shortness of breath [R06.02], Radiation dermatitis [L58.9], Chest wall abscess [L02.213], Port-A-Cath in place [Z95.828].       is involved.  Mr. Montgomery was referred to the Social Work Department via routine referal.  Patient presents as a 77 y.o. year old  male.    Persons interviewed: Patient and with his permission, pt's sister, bedside.     Living Situation:      Resides [address]  [place] [id number], phone: [phone] (home).  Pt lives at home with his wife.  He said she is "fragile" and needs help caring for herself.  She is not able to assist pt.     (RETIRED) Functional Status Prior  Ambulation Prior: 3-->assistive equipment and person  Transferring: 3-->assistive equipment and person  Toileting: 3-->assistive equipment and person    Current or Past Agencies and Description of Services/Supplies    DME   Agency Name: [facility]  Agency Phone Number: [phone]       Home Health  Agency Name: [facility]  Agency Phone Number: [phone]  Services: SN/PT/OT    IV Infusion: No    Nutrition: Oral/Kidney    Outpatient Pharmacy:     [address] [address]  Phone: [phone] Fax: [phone]    [facility] [address]  [address]   [place] [id number]  Phone: [phone] Fax: [phone]      Patient Preference of agencies include: Patient's daughter Radha asked that pt be referred to [facility], [facility], [facility], and [facility] if he should need to go to SNF.      Patient/Caregiver informed of right to choose providers or agencies.  Patient provides " permission to release any necessary information to [facility] and to [facility] agencies as needed to facilitate patient care, treatment planning, and patient discharge planning.  Written and verbal resources provided.    Coping: Patient and his wife have strong support, but no one who can provide the level of care they are currently requiring.  Pt and his family agree they need a SNF placement.      Adjustment to Diagnosis and Treatment:  Appropriate    Emotional/Behavioral/Cognitive Issues: None noted.     History/Current Symptoms of Anxiety/Depression: No  History/Current Substance Use:   Social History     Tobacco Use    Smoking status: Never Smoker    Smokeless tobacco: Never Used   Substance and Sexual Activity    Alcohol use: Yes     Alcohol/week: 2.0 standard drinks     Types: 2 Cans of beer per week     Comment: occasionally, beer    Drug use: Never    Sexual activity: Not on file       Indications of Abuse/Neglect: No  Abuse Screen: Not indicated.   Feels Unsafe at Home or Work/School: No  Feels Threatened by Someone: no  Does Anyone Try to Keep You From Having Contact with Others or Doing Things Outside Your Home?: no  Physical Signs of Abuse Present: no    Financial:  Payer/Plan Subscr  Sex Relation Sub. Ins. ID Effective Group Num   1. [employer] MANAGE* JACKIE GERMAIN * 1945 Male Self [id number] 1/1/[birth year] [id number]                                   [address] [address]   2. [employer] PPO [facility] * JACKIE GERMAIN * 1945 Male Self [id number]                                   [address] [address]      Other identified concerns/needs:  Patient reported that he had multiple falls at home and feels it is dangerous for him to return without some strengthening first.      Plan:  To be determined.     Interventions/Referrals:  outlined social work services available through the [facility] and provided name and contact information for any future patient needs.      Patient/caregiver  engaged in treatment planning process.     providing psychosocial and supportive counseling, resources, education, assistance and discharge planning as appropriate.  Patient/caregiver state understanding of  available resources,  following, remains available.

## 2022-08-24 NOTE — H&P
[facility] - Emergency Dept  Hematology/Oncology  H&P    Patient Name: Jaunsz Montgomery Jr.  MRN: [id number]  Admission Date: 8/23/2022  Code Status: Full Code   Attending Provider: Bhavana Abdalla, *  Primary Care Physician: Miguelina Weathers MD  Principal Problem:Infection of venous access port    Subjective:     HPI: Janusz Montgomery is a 77 y.o. 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 Follows with Dr. Hargrove. Completed chemoradiation therapy on 8/1/22. Reports some redness to right neck which he attributes to radiation. However, has since developed redness, swelling, and tenderness overlying right chest port concerning for possible infection over last 2-3 days. Denies fever or chills. Increasing edema in extremities worse in RUE over last 4-5 days and generalized weakness causing difficulty ambulating. Recently hospitalized with dehydration, NAT, weakness, C dif. Completed vancomycin PO 7 days and diarrhea resolved. Rehydrated and concern that he then became overloaded. Cr uptrending on discharge. Recently saw nephro 8/22 for hospital f/u, noted worsening Cr baseline and increased Lasix to 40 mg daily.       Oncology Treatment Plan:   OP CARBOPLATIN WEEKLY    Medications:  Continuous Infusions:  Scheduled Meds:   [START ON 8/24/2022] vancomycin (VANCOCIN) IVPB  2,000 mg Intravenous ED 1 Time     PRN Meds:     Review of patient's allergies indicates:   Allergen Reactions    Ciprofloxacin Rash     Diffuse pruritic morbilliform rash developed 3/15/2017 after dose of cipro; previously in 2/2017 he had rash/fevers after initiation of cipro    Zosyn [piperacillin-tazobactam] Rash     Diffuse pruritic morbilliform rash developed 3/15/2017.  Then, 430am dose on 3/16 and rash worsened with  SOB/tachypnea but no hypoxemia.     Bacitracin Itching and Rash     Violaceous rash in area of topical Tx.         Past Medical History:   Diagnosis Date    (HFpEF) heart failure with preserved ejection fraction 02/04/2022    NAT (acute kidney injury) 03/19/2017    ALLERGIC RHINITIS     Anemia     Anxiety     Basal cell carcinoma 10/19/2018    forehead and right medial shoulder    Basal cell carcinoma 01/09/2019    left nasal bridge and left posterior ear    Basal cell carcinoma 06/12/2020    left lower medial leg - Efudex    Basal cell carcinoma 11/30/2020    left medial upper eyelid canthus superficial BCC    Chronic rhinitis 05/03/2013    Chronic rhinitis 05/03/2013    Coronary artery disease involving native coronary artery of native heart without angina pectoris s/p RCA stent     Cortical cataract of both eyes 03/18/2016    Delayed sleep phase syndrome 03/13/2019    Depression     Erectile dysfunction 03/24/2014    Erectile dysfunction 03/24/2014    Essential hypertension     GERD (gastroesophageal reflux disease) 07/25/2012    Gout, arthritis     Grade III open fracture of left tibia and fibula s/p ex-fix on 7/1/16 and ORIF of left tibia on 7/15 07/06/2016    H/O: iritis     Helicobacter pylori (H. pylori) infection     Treated    Hepatitis     Herpes simplex keratoconjunctivitis 09/30/2015    - on acyclovir - followed by opthalmology, Dr. Uribe     Herpes simplex keratoconjunctivitis 09/30/2015    - on acyclovir - followed by opthalmology, Dr. Uribe     Hyperkalemia 02/28/2017    Hyperlipidemia     Hypogonadism male     Hypogonadism male     Immunodeficiency due to drug therapy 07/08/2022    Keloid cicatrix     Lymphoma     HEAD & NECK    Mixed anxiety and depressive disorder     Morbid obesity     Obstructive sleep apnea on CPAP     CPAP    On home oxygen therapy     Osteoarthritis of left knee 07/25/2012    Paroxysmal atrial fibrillation 07/06/2016    Primary  "osteoarthritis of left knee 07/25/2012    Prominent aorta 01/25/2016    "RESULTS: THE HEART IS MILDLY ENLARGED WITH A SLIGHTLY PROMINENT AORTA" - Xray Chest PA & Lateral 12-     Prostate cancer 02/15/2016    - followed by urology, Dr. Young     Prostate cancer 02/15/2016    - followed by urology, Dr. 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    chest and forehead    Squamous cell cancer of skin of nose     Traumatic type III open fracture of shaft of left tibia and fibula with nonunion 07/06/2016    Type III open fracture of left tibia and fibula with routine healing 07/06/2016    Vitamin D deficiency disease     Vitreous detachment 03/18/2016     Past Surgical History:   Procedure Laterality Date    ADJACENT TISSUE TRANSFER Left 11/30/2020    Procedure: ADJACENT TISSUE TRANSFER x2 ;  Surgeon: Teresa Cooper MD;  Location: [facility] 1ST FLR;  Service: Ophthalmology;  Laterality: Left;  Left glabellar 8.5x11x3 and Left upper eyelid advancement flap [id number]      Cardiac stenting x2      CATARACT EXTRACTION W/  INTRAOCULAR LENS IMPLANT Right 3/29/2016    Dr. Conteh    CATARACT EXTRACTION W/  INTRAOCULAR LENS IMPLANT Left 4/12/2016        COLONOSCOPY N/A 7/23/2020    Procedure: COLONOSCOPY;  Surgeon: Nico Lackey MD;  Location: [facility] (2ND FLR);  Service: Endoscopy;  Laterality: N/A;    DIRECT LARYNGOSCOPY N/A 5/4/2022    Procedure: LARYNGOSCOPY, DIRECT possible biopsy;  Surgeon: Etta Higgins MD;  Location: [facility];  Service: ENT;  Laterality: N/A;  RN PREOP ON 4/29/22. NEEDS T&S DAY OF SURGERY--NF  CONSENTS DAY OF SURGERY    DISSECTION OF NECK Right 5/4/2022    " "Procedure: RADICAL NECK DISSECTION;  Surgeon: Etta Higgins MD;  Location: [facility] OR;  Service: ENT;  Laterality: Right;  TO FOLLOW DR ALMAZAN    ESOPHAGOGASTRODUODENOSCOPY N/A 7/23/2020    Procedure: EGD (ESOPHAGOGASTRODUODENOSCOPY);  Surgeon: Nico Lackey MD;  Location: [facility]2ND FLR);  Service: Endoscopy;  Laterality: N/A;  per Dr Lackey-Will proceed with EGD and colonoscopy on the 2nd floor due to his comorbidities including obesity, sleep apnea, restrictive lung disease, coronary artery disease.  has loop recorder      ok to hold Eliquis 2 days per Dr Sandhu-must remain    EXTERNAL FIXATION TIBIAL FRACTURE Left 07/01/2016    INSERTION OF IMPLANTABLE LOOP RECORDER  04/07/2017    LEFT HEART CATHETERIZATION Left 1/30/2020    Procedure: Left heart cath;  Surgeon: Benjamin Sandhu MD;  Location: [facility] CATH LAB;  Service: Cardiology;  Laterality: Left;  RN PREOP 1/28/20--Pt starting Plavix loading dose today (8pills)- Dr. Sandhu aware.  Pt has a bandaged "non healing area to LLE"--Dr. Sandhu aware.    LEFT HEART CATHETERIZATION Left 2/14/2020    Procedure: Left heart cath, IVUS guided left main / LAD PCI. Noon start, radial approach;  Surgeon: Miguel Angel Brady MD;  Location: [facility] CATH LAB;  Service: Cardiology;  Laterality: Left;  RN PRE OP 2-7-2020  BMI--45.61    ORIF TIBIA FRACTURE Left 07/15/2016    PROBING OF NASOLACRIMAL DUCT WITH INSERTION OF TUBE Left 11/30/2020    Procedure: PROBING, NASOLACRIMAL DUCT,;  Surgeon: Teresa Cooper MD;  Location: [address];  Service: Ophthalmology;  Laterality: Left;    RADIOACTIVE SEED IMPLANTATION OF PROSTATE N/A 8/8/2018    Procedure: INSERTION, RADIOACTIVE SEED, PROSTATE;  Surgeon: Bipin Thompson MD;  Location: [address];  Service: Urology;  Laterality: N/A;  1 hour    SKIN BIOPSY      Squamous cell cancer removal x3 with Mohs surgery      TONSILLECTOMY      TOTAL KNEE ARTHROPLASTY  10/2012    TRIGGER FINGER RELEASE Right 10/14/2020    " Procedure: RELEASE, TRIGGER FINGER - right;  Surgeon: Rad Swift MD;  Location: [facility] OR;  Service: Orthopedics;  Laterality: Right;    trus/bx      ULTRASOUND GUIDANCE  2/14/2020    Procedure: Ultrasound Guidance;  Surgeon: Miguel Angel Brady MD;  Location: [facility] CATH LAB;  Service: Cardiology;;     Family History       Problem Relation (Age of Onset)    Alzheimer's disease Mother    Cancer Father, Sister, Brother    Hypertension Mother    Lung cancer Father    No Known Problems Sister, Maternal Aunt, Maternal Uncle, Paternal Aunt, Paternal Uncle, Maternal Grandmother, Maternal Grandfather, Paternal Grandmother, Paternal Grandfather    Peripheral vascular disease     Skin cancer Father          Tobacco Use    Smoking status: Never Smoker    Smokeless tobacco: Never Used   Substance and Sexual Activity    Alcohol use: Yes     Alcohol/week: 2.0 standard drinks     Types: 2 Cans of beer per week     Comment: occasionally, beer    Drug use: Never    Sexual activity: Not on file       Review of Systems   Constitutional:  Positive for chills and fatigue.   HENT:  Negative for sore throat.    Respiratory:  Negative for cough and shortness of breath.    Cardiovascular:  Positive for leg swelling. Negative for chest pain.   Gastrointestinal:  Negative for abdominal pain, constipation, diarrhea, nausea and vomiting.   Genitourinary:  Negative for dysuria.   Musculoskeletal:  Negative for myalgias.   Skin:  Positive for color change.   Neurological:  Positive for weakness. Negative for dizziness and headaches.   Psychiatric/Behavioral:  Negative for confusion.    Objective:     Vital Signs (Most Recent):  Temp: 98.2 °F (36.8 °C) (08/23/22 2134)  Pulse: 72 (08/23/22 2134)  Resp: 18 (08/23/22 2134)  BP: (!) 118/55 (08/23/22 2134)  SpO2: 98 % (08/23/22 2134)   Vital Signs (24h Range):  Temp:  [98.1 °F (36.7 °C)-98.2 °F (36.8 °C)] 98.2 °F (36.8 °C)  Pulse:  [72-74] 72  Resp:  [18-21] 18  SpO2:  [98 %] 98 %  BP:  (118-122)/(55-60) 118/55     Weight: (!) 146.7 kg (323 lb 6.6 oz)  Body mass index is 42.67 kg/m².  Body surface area is 2.75 meters squared.    No intake or output data in the 24 hours ending 08/23/22 2445    Physical Exam  Constitutional:       General: He is not in acute distress.     Appearance: He is ill-appearing.   HENT:      Head: Normocephalic and atraumatic.      Mouth/Throat:      Mouth: Mucous membranes are moist.      Pharynx: Oropharynx is clear.   Eyes:      Extraocular Movements: Extraocular movements intact.      Pupils: Pupils are equal, round, and reactive to light.   Neck:      Comments: Right neck erythema  Cardiovascular:      Rate and Rhythm: Normal rate and regular rhythm.      Pulses: Normal pulses.      Heart sounds: Normal heart sounds.   Pulmonary:      Effort: Pulmonary effort is normal. No respiratory distress.      Breath sounds: Normal breath sounds.   Abdominal:      General: Abdomen is flat. Bowel sounds are normal.      Palpations: Abdomen is soft.   Musculoskeletal:      Cervical back: Neck supple.      Right lower leg: Edema present.      Left lower leg: Edema present.   Skin:     General: Skin is warm and dry.      Capillary Refill: Capillary refill takes less than 2 seconds.      Findings: Erythema (erythema, edema, tenderness, warmth overlying right chest port with two indurated bumps) present.   Neurological:      General: No focal deficit present.      Mental Status: He is alert and oriented to person, place, and time. Mental status is at baseline.   Psychiatric:         Mood and Affect: Mood normal.         Behavior: Behavior normal.         Thought Content: Thought content normal.         Judgment: Judgment normal.       Significant Labs:   BMP:   Recent Labs   Lab 08/22/22  1240 08/23/22 2044   GLU 96 98    142   K 3.6 4.1   CL 99 99   CO2 34* 35*   BUN 13 13   CREATININE 1.8* 1.8*   CALCIUM 8.9 9.1   , CBC:   Recent Labs   Lab 08/22/22  1240 08/23/22 2044   WBC  6.06 6.84   HGB 7.4* 8.1*   HCT 22.1* 23.3*   * 145*   , and CMP:   Recent Labs   Lab 08/22/22  1240 08/23/22  2044    142   K 3.6 4.1   CL 99 99   CO2 34* 35*   GLU 96 98   BUN 13 13   CREATININE 1.8* 1.8*   CALCIUM 8.9 9.1   PROT  --  6.4   ALBUMIN 2.9* 2.8*   BILITOT  --  0.9   ALKPHOS  --  134   AST  --  33   ALT  --  18   ANIONGAP 8 8       Diagnostic Results:  I have reviewed all pertinent imaging results/findings within the past 24 hours.    Assessment/Plan:     * Infection of venous access port  -- Suspect likely infection of right chest venous access port  -- No evidence of sepsis  -- Hemodynamically stable  -- Slightly elevated inflammatory markers  -- Two small indurated bumps concerning for possible abscesses    PLAN:  -- Continue vancomycin. De-escalate as indicated  -- F/u BCx (although drawn after administration of vanc)  -- Consider consulting ID, especially if BCx positive for Staphylococcus aureus, Pseudomonas aeruginosa, drug-resistant gram-negative bacilli, or Candida spp  -- Catheter removal may be warranted in the setting of subcutaneous port reservoir infection  -- Soft tissue ultrasound to eval for possible abscesses. May need I&D   -- Closely monitor hemodynamics and fever curve    Acute decompensated heart failure  -- Worsening edema, seems to have progressed more since fluid resuscitation on recent hospitalization.   -- Requiring recent increases in Lasix 20 mg from PRN to daily to 40 mg daily  -- BNP upper level normal. Although likely falsely low in setting of obesity  -- Volume overloaded on exam  -- Lasix 40 mg IV daily. Adjust as clinically indicated  -- Low sodium diet, which he has some non-adherence to per recent cardiology visit  -- Follows closely with cardiology. Scheduled to see 8/29  -- RUE US to rule out DVT. Edema worse c/w LUE    Chronic respiratory failure with hypoxia  Patient with Hypoxic Respiratory failure which is Chronic.  he is on home oxygen at 4 LPM.  Supplemental oxygen was provided and noted-  .   Signs/symptoms of respiratory failure include- lethargy. Contributing diagnoses includes - CHF, restrictive lung disease. Labs and images were reviewed. Patient Has not had a recent ABG. Will treat underlying causes and adjust management of respiratory failure as follows- Continue home 4L O2    Head and neck cancer  -- Squamous cell carcinoma of the right head and neck s/p radical neck dissection and chemoradiation with carboplatin, completed 8/1/22. Follows with Dr. Hargrove.     Paroxysmal atrial fibrillation   -- Continue home amiodarone 200 mg daily, metoprolol succinate 25 mg daily, and Eliquis 5 mg BID  -- F/u with his cardiologist (Dr. Sandhu) as scheduled        Jamila Kennedy DO  Hematology/Oncology  [facility] - Emergency Dept

## 2022-08-24 NOTE — H&P
Inpatient Radiology Pre-procedure Note    History of Present Illness:  Janusz Montgomery Jr. is a 77 y.o. 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 US imaging showed fluid collection adjacent to the port, concerning for hematoma vs abscess. DVT study showed thrombosis of RIJ and R subclavian vein. IR consulted for port removal.     Admission H&P reviewed.    Past Medical History:   Diagnosis Date    (HFpEF) heart failure with preserved ejection fraction 02/04/2022    NAT (acute kidney injury) 03/19/2017    ALLERGIC RHINITIS     Anemia     Anxiety     Basal cell carcinoma 10/19/2018    forehead and right medial shoulder    Basal cell carcinoma 01/09/2019    left nasal bridge and left posterior ear    Basal cell carcinoma 06/12/2020    left lower medial leg - Efudex    Basal cell carcinoma 11/30/2020    left medial upper eyelid canthus superficial BCC    Chronic rhinitis 05/03/2013    Chronic rhinitis 05/03/2013    Coronary artery disease involving native coronary artery of native heart without angina pectoris s/p RCA stent     Cortical cataract of both eyes 03/18/2016    Delayed sleep phase syndrome 03/13/2019    Depression     Erectile dysfunction 03/24/2014    Erectile dysfunction 03/24/2014    Essential hypertension     GERD (gastroesophageal reflux disease) 07/25/2012    Gout, arthritis     Grade III open fracture of left tibia and fibula s/p ex-fix on 7/1/16 and ORIF of left tibia on 7/15 07/06/2016    H/O: iritis     Helicobacter pylori (H. pylori) infection     Treated    Hepatitis     Herpes simplex keratoconjunctivitis 09/30/2015    - on acyclovir - followed by opthalmology, Dr. Uribe     Herpes simplex keratoconjunctivitis 09/30/2015    - on acyclovir - followed by opthalmology, Dr. Uribe     Hyperkalemia 02/28/2017     "Hyperlipidemia     Hypogonadism male     Hypogonadism male     Immunodeficiency due to drug therapy 07/08/2022    Keloid cicatrix     Lymphoma     HEAD & NECK    Mixed anxiety and depressive disorder     Morbid obesity     Obstructive sleep apnea on CPAP     CPAP    On home oxygen therapy     Osteoarthritis of left knee 07/25/2012    Paroxysmal atrial fibrillation 07/06/2016    Primary osteoarthritis of left knee 07/25/2012    Prominent aorta 01/25/2016    "RESULTS: THE HEART IS MILDLY ENLARGED WITH A SLIGHTLY PROMINENT AORTA" - Xray Chest PA & Lateral 12-     Prostate cancer 02/15/2016    - followed by urology, Dr. Young     Prostate cancer 02/15/2016    - followed by urology, Dr. 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    chest and forehead    Squamous cell cancer of skin of nose     Traumatic type III open fracture of shaft of left tibia and fibula with nonunion 07/06/2016    Type III open fracture of left tibia and fibula with routine healing 07/06/2016    Vitamin D deficiency disease     Vitreous detachment 03/18/2016     Past Surgical History:   Procedure Laterality Date    ADJACENT TISSUE TRANSFER Left 11/30/2020    Procedure: ADJACENT TISSUE TRANSFER x2 ;  Surgeon: Teresa Cooper MD;  Location: [facility] OR [address] [place] [address];  Service: Ophthalmology;  Laterality: Left;  Left glabellar 8.5x11x3 and Left upper eyelid advancement flap [id number]      Cardiac stenting x2      CATARACT EXTRACTION W/  INTRAOCULAR LENS IMPLANT Right 3/29/2016    Dr. Conteh    CATARACT EXTRACTION W/  INTRAOCULAR LENS IMPLANT Left 4/12/2016        COLONOSCOPY N/A 7/23/2020    Procedure: " "COLONOSCOPY;  Surgeon: Nico Lackey MD;  Location: [facility] (2ND FLR);  Service: Endoscopy;  Laterality: N/A;    DIRECT LARYNGOSCOPY N/A 5/4/2022    Procedure: LARYNGOSCOPY, DIRECT possible biopsy;  Surgeon: Etta Higgins MD;  Location: [facility] OR;  Service: ENT;  Laterality: N/A;  RN PREOP ON 4/29/22. NEEDS T&S DAY OF SURGERY--NF  CONSENTS DAY OF SURGERY    DISSECTION OF NECK Right 5/4/2022    Procedure: RADICAL NECK DISSECTION;  Surgeon: Etta Higgins MD;  Location: [facility] OR;  Service: ENT;  Laterality: Right;  TO FOLLOW DR ALMAZAN    ESOPHAGOGASTRODUODENOSCOPY N/A 7/23/2020    Procedure: EGD (ESOPHAGOGASTRODUODENOSCOPY);  Surgeon: Nico Lackey MD;  Location: [facility] (2ND FLR);  Service: Endoscopy;  Laterality: N/A;  per Dr Lackey-Will proceed with EGD and colonoscopy on the 2nd floor due to his comorbidities including obesity, sleep apnea, restrictive lung disease, coronary artery disease.  has loop recorder      ok to hold Eliquis 2 days per Dr Sandhu-must remain    EXTERNAL FIXATION TIBIAL FRACTURE Left 07/01/2016    INSERTION OF IMPLANTABLE LOOP RECORDER  04/07/2017    LEFT HEART CATHETERIZATION Left 1/30/2020    Procedure: Left heart cath;  Surgeon: Benjamin Sandhu MD;  Location: [facility] CATH LAB;  Service: Cardiology;  Laterality: Left;  RN PREOP 1/28/20--Pt starting Plavix loading dose today (8pills)- Dr. Sandhu aware.  Pt has a bandaged "non healing area to LLE"--Dr. Sandhu aware.    LEFT HEART CATHETERIZATION Left 2/14/2020    Procedure: Left heart cath, IVUS guided left main / LAD PCI. Noon start, radial approach;  Surgeon: Miguel Angel Brady MD;  Location: [facility] CATH LAB;  Service: Cardiology;  Laterality: Left;  RN PRE OP 2-7-2020  BMI--45.61    ORIF TIBIA FRACTURE Left 07/15/2016    PROBING OF NASOLACRIMAL DUCT WITH INSERTION OF TUBE Left 11/30/2020    Procedure: PROBING, NASOLACRIMAL DUCT,;  Surgeon: Teresa Cooper MD;  Location: [facility] 1ST FLR;  Service: Ophthalmology;  " Laterality: Left;    RADIOACTIVE SEED IMPLANTATION OF PROSTATE N/A 8/8/2018    Procedure: INSERTION, RADIOACTIVE SEED, PROSTATE;  Surgeon: Bipin Thompson MD;  Location: [address];  Service: Urology;  Laterality: N/A;  1 hour    SKIN BIOPSY      Squamous cell cancer removal x3 with Mohs surgery      TONSILLECTOMY      TOTAL KNEE ARTHROPLASTY  10/2012    TRIGGER FINGER RELEASE Right 10/14/2020    Procedure: RELEASE, TRIGGER FINGER - right;  Surgeon: Rad Swift MD;  Location: [facility] OR;  Service: Orthopedics;  Laterality: Right;    trus/bx      ULTRASOUND GUIDANCE  2/14/2020    Procedure: Ultrasound Guidance;  Surgeon: Miguel Angel Brady MD;  Location: [facility] CATH LAB;  Service: Cardiology;;       Review of Systems:   As documented in primary team H&P    Home Meds:   Prior to Admission medications    Medication Sig Start Date End Date Taking? Authorizing Provider   acyclovir (ZOVIRAX) 800 MG Tab Take 200 mg by mouth 2 (two) times daily.   Yes Historical Provider   amiodarone (PACERONE) 200 MG Tab Take 1 tablet (200 mg total) by mouth once daily. 6/23/22 6/23/23 Yes Benjamin Sandhu MD   artificial tears (ISOPTO TEARS) 0.5 % ophthalmic solution Place 1 drop into both eyes as needed. 1/23/17  Yes Mike Hendrix NP   atorvastatin (LIPITOR) 40 MG tablet Take 1 tablet (40 mg total) by mouth once daily.  Patient taking differently: Take 40 mg by mouth every evening. 4/1/22  Yes Miguelina Weathers MD   clopidogreL (PLAVIX) 75 mg tablet Take 1 tablet by mouth once daily  Patient taking differently: Take by mouth once daily. 4/24/22  Yes Benjamin Sandhu MD   ELIQUIS 5 mg Tab Take 1 tablet by mouth twice daily 10/4/21  Yes Benjamin Sandhu MD   furosemide (LASIX) 20 MG tablet Take 2 tablets (40 mg total) by mouth once daily. 8/22/22 8/17/23 Yes Malini Pagan, NP   gabapentin (NEURONTIN) 100 MG capsule 2 capsules once daily 7/6/22  Yes Shantel Gunter PA-C   melatonin 5 mg Tab Take 10 mg by mouth  nightly.   Yes Historical Provider   metoprolol succinate (TOPROL-XL) 25 MG 24 hr tablet Take 1 tablet by mouth once daily  Patient taking differently: Take by mouth every morning. 1/14/22  Yes Benjamin Sandhu MD   oxybutynin (DITROPAN-XL) 5 MG TR24 Take 1 tablet (5 mg total) by mouth every evening. 8/17/22 8/17/23 Yes Al Handley MD   tamsulosin (FLOMAX) 0.4 mg Cap Take 1 capsule (0.4 mg total) by mouth every evening. 8/3/22  Yes AURELIANO Minor MD   vancomycin 125 mg/5 mL Soln Take 5 mLs (125 mg total) by mouth every 6 (six) hours. for 7 days 8/17/22 8/24/22 Yes Al Handley MD   venlafaxine (EFFEXOR) 75 MG tablet Take 1 tablet (75 mg total) by mouth every evening. 8/17/22  Yes Al Handley MD   vitamin D 1000 units Tab Take 1 tablet (1,000 Units total) by mouth once daily.  Patient taking differently: Take 2,000 Units by mouth once daily. 8/5/16  Yes Shivani Espinosa MD   dexAMETHasone (DECADRON) 4 MG Tab 4mg (1 tablet) by mouth twice a day for two days starting the day after chemo.  Repeat after each dose of chemo. 6/8/22   Robert Hargrove MD   diphenhydrAMINE-aluminum-magnesium hydroxide-simethicone-LIDOcaine HCl 2% Swish and spit 15 mLs every 4 (four) hours as needed.    Historical Provider   gly-carb h-poly a-pot hydrox (MUGARD) Soln by Mucous Membrane route.    Historical Provider   hydrocodone-acetaminophen (HYCET) solution 7.5-325 mg/15mL Take 15 mLs by mouth every 8 (eight) hours as needed for Pain. 7/29/22   Robert Hargrove MD   LIDOcaine-prilocaine (EMLA) cream Apply generously to port site 30-60 min prior to chemo and then cover with saran wrap. 6/8/22   Robert Hargrove MD   nitroGLYCERIN (NITROSTAT) 0.4 MG SL tablet Place 1 tablet (0.4 mg total) under the tongue every 5 (five) minutes as needed for Chest pain. 6/29/21 6/29/22  Benjamin Sandhu MD   pantoprazole (PROTONIX) 40 MG tablet Take 1 tablet (40 mg total) by mouth once daily. 6/23/22 6/23/23  Benjamin Sandhu MD   silver sulfADIAZINE 1%  (SILVADENE) 1 % cream Apply topically. 7/18/22   Historical Provider   sucralfate (CARAFATE) 100 mg/mL suspension Take 10 mLs (1 g total) by mouth 4 (four) times daily before meals and nightly. 8/17/22   Al Handley MD   albuterol (VENTOLIN HFA) 90 mcg/actuation inhaler Inhale 2 puffs into the lungs every 6 (six) hours as needed for Wheezing. Rescue 5/7/22 5/16/22  Etta Higgins MD   omeprazole (PRILOSEC) 20 MG capsule Take 1 capsule (20 mg total) by mouth daily as needed. 4/17/20 5/7/22  Miguelina Weathers MD   sildenafiL (VIAGRA) 100 MG tablet Take 1 tablet (100 mg total) by mouth daily as needed. 2/2/22 8/17/22  AURELIANO Minor MD   traZODone (DESYREL) 50 MG tablet Take 1 tablet (50 mg total) by mouth nightly as needed for Insomnia.  Patient not taking: Reported on 7/19/2022 4/27/22 8/17/22  Pito Claros DO     Scheduled Meds:    acyclovir  200 mg Oral BID    amiodarone  200 mg Oral Daily    atorvastatin  40 mg Oral QHS    cefTRIAXone (ROCEPHIN) IVPB  1 g Intravenous Q24H    clopidogreL  75 mg Oral Daily    enoxaparin  1 mg/kg Subcutaneous Q12H    furosemide (LASIX) injection  40 mg Intravenous Daily    gabapentin  200 mg Oral Daily    melatonin  9 mg Oral QHS    metoprolol succinate  25 mg Oral Daily    oxybutynin  5 mg Oral QHS    pantoprazole  40 mg Oral Daily    tamsulosin  1 capsule Oral QHS    venlafaxine  75 mg Oral QHS     Continuous Infusions:   PRN Meds:acetaminophen, aluminum-magnesium hydroxide-simethicone, dextrose 10%, dextrose 10%, glucagon (human recombinant), glucose, glucose, HYDROcodone-acetaminophen, naloxone, ondansetron, promethazine, senna-docusate 8.6-50 mg, simethicone, sodium chloride 0.9%, Pharmacy to dose Vancomycin consult **AND** vancomycin - pharmacy to dose  Anticoagulants/Antiplatelets: Plavix, Lovenox and eliquis    Allergies:   Review of patient's allergies indicates:   Allergen Reactions    Ciprofloxacin Rash     Diffuse pruritic morbilliform  rash developed 3/15/2017 after dose of cipro; previously in 2/2017 he had rash/fevers after initiation of cipro    Zosyn [piperacillin-tazobactam] Rash     Diffuse pruritic morbilliform rash developed 3/15/2017.  Then, 430am dose on 3/16 and rash worsened with SOB/tachypnea but no hypoxemia.     Bacitracin Itching and Rash     Violaceous rash in area of topical Tx.      Sedation Hx: have not been any systemic reactions    Labs:  No results for input(s): INR in the last 168 hours.    Invalid input(s):  PT,  PTT    Recent Labs   Lab 08/24/22  0303   WBC 6.72   HGB 7.6*   HCT 22.7*   *   *      Recent Labs   Lab 08/24/22  0303         K 3.8   CL 98   CO2 36*   BUN 14   CREATININE 1.9*   CALCIUM 9.1   MG 1.5*   ALT 16   AST 18   ALBUMIN 2.8*   BILITOT 1.0         Vitals:  Temp: 97.4 °F (36.3 °C) (08/24/22 1130)  Pulse: 76 (08/24/22 1130)  Resp: 18 (08/24/22 1130)  BP: (!) 107/49 (08/24/22 1130)  SpO2: 97 % (08/24/22 1130)     Physical Exam:  ASA: III   Mallampati: II    General: no acute distress  Mental Status: alert and oriented to person, place and time  Chest: unlabored breathing  Heart: regular heart rate  Abdomen: nondistended  Extremity: moves all extremities      Plan:  Sedation Plan: up to moderate.    1. Please switch patient to heparin drip for possible procedure.   2. Please hold plavix.  3. We will continue to follow patients blood cultures and infectious disease recommendations moving forwards.       Tavia Gillespie MD  Radiology Resident PGY- 2  [facility]

## 2022-08-24 NOTE — ASSESSMENT & PLAN NOTE
Patient with Hypoxic Respiratory failure which is Chronic.  he is on home oxygen at 4 LPM. Supplemental oxygen was provided and noted-  .   Signs/symptoms of respiratory failure include- lethargy. Contributing diagnoses includes - CHF, restrictive lung disease. Labs and images were reviewed. Patient Has not had a recent ABG. Will treat underlying causes and adjust management of respiratory failure as follows- Continue home 4L O2

## 2022-08-24 NOTE — SUBJECTIVE & OBJECTIVE
"Past Medical History:   Diagnosis Date    (HFpEF) heart failure with preserved ejection fraction 02/04/2022    NAT (acute kidney injury) 03/19/2017    ALLERGIC RHINITIS     Anemia     Anxiety     Basal cell carcinoma 10/19/2018    forehead and right medial shoulder    Basal cell carcinoma 01/09/2019    left nasal bridge and left posterior ear    Basal cell carcinoma 06/12/2020    left lower medial leg - Efudex    Basal cell carcinoma 11/30/2020    left medial upper eyelid canthus superficial BCC    Chronic rhinitis 05/03/2013    Chronic rhinitis 05/03/2013    Coronary artery disease involving native coronary artery of native heart without angina pectoris s/p RCA stent     Cortical cataract of both eyes 03/18/2016    Delayed sleep phase syndrome 03/13/2019    Depression     Erectile dysfunction 03/24/2014    Erectile dysfunction 03/24/2014    Essential hypertension     GERD (gastroesophageal reflux disease) 07/25/2012    Gout, arthritis     Grade III open fracture of left tibia and fibula s/p ex-fix on 7/1/16 and ORIF of left tibia on 7/15 07/06/2016    H/O: iritis     Helicobacter pylori (H. pylori) infection     Treated    Hepatitis     Herpes simplex keratoconjunctivitis 09/30/2015    - on acyclovir - followed by opthalmology, Dr. Uribe     Herpes simplex keratoconjunctivitis 09/30/2015    - on acyclovir - followed by opthalmology, Dr. Uribe     Hyperkalemia 02/28/2017    Hyperlipidemia     Hypogonadism male     Hypogonadism male     Immunodeficiency due to drug therapy 07/08/2022    Keloid cicatrix     Lymphoma     HEAD & NECK    Mixed anxiety and depressive disorder     Morbid obesity     Obstructive sleep apnea on CPAP     CPAP    On home oxygen therapy     Osteoarthritis of left knee 07/25/2012    Paroxysmal atrial fibrillation 07/06/2016    Primary osteoarthritis of left knee 07/25/2012    Prominent aorta 01/25/2016    "RESULTS: THE HEART IS MILDLY ENLARGED WITH A SLIGHTLY PROMINENT AORTA" - Xray Chest PA & " Lateral 12-     Prostate cancer 02/15/2016    - followed by urology, Dr. Young     Prostate cancer 02/15/2016    - followed by urology, Dr. 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    chest and forehead    Squamous cell cancer of skin of nose     Traumatic type III open fracture of shaft of left tibia and fibula with nonunion 07/06/2016    Type III open fracture of left tibia and fibula with routine healing 07/06/2016    Vitamin D deficiency disease     Vitreous detachment 03/18/2016       Past Surgical History:   Procedure Laterality Date    ADJACENT TISSUE TRANSFER Left 11/30/2020    Procedure: ADJACENT TISSUE TRANSFER x2 ;  Surgeon: Teresa Cooper MD;  Location: [facility];  Service: Ophthalmology;  Laterality: Left;  Left glabellar 8.5x11x3 and Left upper eyelid advancement flap [id number]      Cardiac stenting x2      CATARACT EXTRACTION W/  INTRAOCULAR LENS IMPLANT Right 3/29/2016    Dr. Conteh    CATARACT EXTRACTION W/  INTRAOCULAR LENS IMPLANT Left 4/12/2016        COLONOSCOPY N/A 7/23/2020    Procedure: COLONOSCOPY;  Surgeon: Nico Lackey MD;  Location: [facility] (2ND FLR);  Service: Endoscopy;  Laterality: N/A;    DIRECT LARYNGOSCOPY N/A 5/4/2022    Procedure: LARYNGOSCOPY, DIRECT possible biopsy;  Surgeon: Etta Higgins MD;  Location: [facility];  Service: ENT;  Laterality: N/A;  RN PREOP ON 4/29/22. NEEDS T&S DAY OF SURGERY--NF  CONSENTS DAY OF SURGERY    DISSECTION OF NECK Right 5/4/2022    Procedure: RADICAL NECK DISSECTION;  Surgeon: Etta Higgins MD;  Location: [facility] OR;  Service: ENT;  Laterality: Right;  TO FOLLOW DR ALMAZAN    ESOPHAGOGASTRODUODENOSCOPY N/A 7/23/2020  "   Procedure: EGD (ESOPHAGOGASTRODUODENOSCOPY);  Surgeon: Nico Lackey MD;  Location: [facility] ([address] [place] [address]);  Service: Endoscopy;  Laterality: N/A;  per Dr Lackey-Will proceed with EGD and colonoscopy on the 2nd floor due to his comorbidities including obesity, sleep apnea, restrictive lung disease, coronary artery disease.  has loop recorder      ok to hold Eliquis 2 days per Dr Sandhu-must remain    EXTERNAL FIXATION TIBIAL FRACTURE Left 07/01/2016    INSERTION OF IMPLANTABLE LOOP RECORDER  04/07/2017    LEFT HEART CATHETERIZATION Left 1/30/2020    Procedure: Left heart cath;  Surgeon: Benjamin Sandhu MD;  Location: [facility] CATH LAB;  Service: Cardiology;  Laterality: Left;  RN PREOP 1/28/20--Pt starting Plavix loading dose today (8pills)- Dr. Sandhu aware.  Pt has a bandaged "non healing area to LLE"--Dr. Sandhu aware.    LEFT HEART CATHETERIZATION Left 2/14/2020    Procedure: Left heart cath, IVUS guided left main / LAD PCI. Noon start, radial approach;  Surgeon: Miguel Angel Brady MD;  Location: [facility] CATH LAB;  Service: Cardiology;  Laterality: Left;  RN PRE OP 2-7-2020  BMI--45.61    ORIF TIBIA FRACTURE Left 07/15/2016    PROBING OF NASOLACRIMAL DUCT WITH INSERTION OF TUBE Left 11/30/2020    Procedure: PROBING, NASOLACRIMAL DUCT,;  Surgeon: Teresa Cooper MD;  Location: [address];  Service: Ophthalmology;  Laterality: Left;    RADIOACTIVE SEED IMPLANTATION OF PROSTATE N/A 8/8/2018    Procedure: INSERTION, RADIOACTIVE SEED, PROSTATE;  Surgeon: Bipin Thompson MD;  Location: [address];  Service: Urology;  Laterality: N/A;  1 hour    SKIN BIOPSY      Squamous cell cancer removal x3 with Mohs surgery      TONSILLECTOMY      TOTAL KNEE ARTHROPLASTY  10/2012    TRIGGER FINGER RELEASE Right 10/14/2020    Procedure: RELEASE, TRIGGER FINGER - right;  Surgeon: Rad Swift MD;  Location: [facility];  Service: Orthopedics;  Laterality: Right;    trus/bx      ULTRASOUND GUIDANCE  2/14/2020    Procedure: " Ultrasound Guidance;  Surgeon: Miguel Angel Brady MD;  Location: [facility] CATH LAB;  Service: Cardiology;;       Review of patient's allergies indicates:   Allergen Reactions    Ciprofloxacin Rash     Diffuse pruritic morbilliform rash developed 3/15/2017 after dose of cipro; previously in 2/2017 he had rash/fevers after initiation of cipro    Zosyn [piperacillin-tazobactam] Rash     Diffuse pruritic morbilliform rash developed 3/15/2017.  Then, 430am dose on 3/16 and rash worsened with SOB/tachypnea but no hypoxemia.     Bacitracin Itching and Rash     Violaceous rash in area of topical Tx.        Medications:  Medications Prior to Admission   Medication Sig    acyclovir (ZOVIRAX) 800 MG Tab Take 200 mg by mouth 2 (two) times daily.    amiodarone (PACERONE) 200 MG Tab Take 1 tablet (200 mg total) by mouth once daily.    artificial tears (ISOPTO TEARS) 0.5 % ophthalmic solution Place 1 drop into both eyes as needed.    atorvastatin (LIPITOR) 40 MG tablet Take 1 tablet (40 mg total) by mouth once daily. (Patient taking differently: Take 40 mg by mouth every evening.)    clopidogreL (PLAVIX) 75 mg tablet Take 1 tablet by mouth once daily (Patient taking differently: Take by mouth once daily.)    ELIQUIS 5 mg Tab Take 1 tablet by mouth twice daily    furosemide (LASIX) 20 MG tablet Take 2 tablets (40 mg total) by mouth once daily.    gabapentin (NEURONTIN) 100 MG capsule 2 capsules once daily    melatonin 5 mg Tab Take 10 mg by mouth nightly.    metoprolol succinate (TOPROL-XL) 25 MG 24 hr tablet Take 1 tablet by mouth once daily (Patient taking differently: Take by mouth every morning.)    oxybutynin (DITROPAN-XL) 5 MG TR24 Take 1 tablet (5 mg total) by mouth every evening.    tamsulosin (FLOMAX) 0.4 mg Cap Take 1 capsule (0.4 mg total) by mouth every evening.    vancomycin 125 mg/5 mL Soln Take 5 mLs (125 mg total) by mouth every 6 (six) hours. for 7 days    venlafaxine (EFFEXOR) 75 MG tablet Take 1 tablet (75 mg total) by  "mouth every evening.    vitamin D 1000 units Tab Take 1 tablet (1,000 Units total) by mouth once daily. (Patient taking differently: Take 2,000 Units by mouth once daily.)    dexAMETHasone (DECADRON) 4 MG Tab 4mg (1 tablet) by mouth twice a day for two days starting the day after chemo.  Repeat after each dose of chemo.    diphenhydrAMINE-aluminum-magnesium hydroxide-simethicone-LIDOcaine HCl 2% Swish and spit 15 mLs every 4 (four) hours as needed.    gly-carb h-poly a-pot hydrox (MUGARD) Soln by Mucous Membrane route.    hydrocodone-acetaminophen (HYCET) solution 7.5-325 mg/15mL Take 15 mLs by mouth every 8 (eight) hours as needed for Pain.    LIDOcaine-prilocaine (EMLA) cream Apply generously to port site 30-60 min prior to chemo and then cover with saran wrap.    nitroGLYCERIN (NITROSTAT) 0.4 MG SL tablet Place 1 tablet (0.4 mg total) under the tongue every 5 (five) minutes as needed for Chest pain.    pantoprazole (PROTONIX) 40 MG tablet Take 1 tablet (40 mg total) by mouth once daily.    silver sulfADIAZINE 1% (SILVADENE) 1 % cream Apply topically.    sucralfate (CARAFATE) 100 mg/mL suspension Take 10 mLs (1 g total) by mouth 4 (four) times daily before meals and nightly.     Antibiotics (From admission, onward)                Start     Stop Route Frequency Ordered    08/24/22 1215  cefTRIAXone (ROCEPHIN) 1 g/50 mL D5W IVPB         -- IV Every 24 hours (non-standard times) 08/24/22 1113    08/24/22 0151  vancomycin - pharmacy to dose  (vancomycin IVPB)        "And" Linked Group Details    -- IV pharmacy to manage frequency 08/24/22 0051          Antifungals (From admission, onward)                None          Antivirals (From admission, onward)          Stop Route Frequency     acyclovir         -- Oral 2 times daily             Immunization History   Administered Date(s) Administered    COVID-19, MRNA, LN-S, PF (Pfizer) (Purple Cap) 01/07/2021, 01/28/2021, 11/23/2021    Influenza 10/06/2008, 12/02/2011, " 12/08/2012    Influenza (FLUAD) - Quadrivalent - Adjuvanted - PF *Preferred* (65+) 09/30/2020, 12/16/2021    Influenza - High Dose - PF (65 years and older) 12/17/2015, 10/24/2016, 12/06/2017, 10/30/2018, 11/20/2019    Influenza Split 10/06/2008, 12/02/2011, 12/08/2012, 12/08/2012, 12/08/2012    PPD Test 08/01/2016, 03/19/2017    Pneumococcal Conjugate - 13 Valent 09/30/2015    Pneumococcal Polysaccharide - 23 Valent 06/06/2014    Tdap 08/21/2020    Zoster 06/01/2009    Zoster Recombinant 09/30/2020, 03/16/2021       Family History       Problem Relation (Age of Onset)    Alzheimer's disease Mother    Cancer Father, Sister, Brother    Hypertension Mother    Lung cancer Father    No Known Problems Sister, Maternal Aunt, Maternal Uncle, Paternal Aunt, Paternal Uncle, Maternal Grandmother, Maternal Grandfather, Paternal Grandmother, Paternal Grandfather    Peripheral vascular disease     Skin cancer Father          Social History     Socioeconomic History    Marital status:    Occupational History    Occupation: Retired    Tobacco Use    Smoking status: Never Smoker    Smokeless tobacco: Never Used   Substance and Sexual Activity    Alcohol use: Yes     Alcohol/week: 2.0 standard drinks     Types: 2 Cans of beer per week     Comment: occasionally, beer    Drug use: Never     Social Determinants of Health     Financial Resource Strain: Low Risk     Difficulty of Paying Living Expenses: Not hard at all   Food Insecurity: No Food Insecurity    Worried About Running Out of Food in the Last Year: Never true    Ran Out of Food in the Last Year: Never true   Transportation Needs: No Transportation Needs    Lack of Transportation (Medical): No    Lack of Transportation (Non-Medical): No   Physical Activity: Inactive    Days of Exercise per Week: 0 days    Minutes of Exercise per Session: 0 min   Stress: No Stress Concern Present    Feeling of Stress : Not at all   Social Connections: Unknown    Frequency of  Communication with Friends and Family: More than three times a week    Frequency of Social Gatherings with Friends and Family: Twice a week    Active Member of Clubs or Organizations: Yes    Attends Club or Organization Meetings: More than 4 times per year    Marital Status:    Housing Stability: Low Risk     Unable to Pay for Housing in the Last Year: No    Number of Places Lived in the Last Year: 1    Unstable Housing in the Last Year: No     Review of Systems   Constitutional:  Positive for appetite change. Negative for activity change, chills and fever.   HENT:  Negative for congestion, dental problem, ear pain and rhinorrhea.    Eyes:  Negative for pain and discharge.   Respiratory:  Negative for cough and shortness of breath.    Cardiovascular:  Negative for chest pain and leg swelling.   Gastrointestinal:  Negative for abdominal distention, abdominal pain, constipation, diarrhea, nausea and vomiting.   Genitourinary:  Negative for difficulty urinating and dysuria.   Musculoskeletal:  Negative for arthralgias, back pain and joint swelling.   Skin:  Positive for color change. Negative for rash and wound.        L heel chronic wound   Allergic/Immunologic: Positive for immunocompromised state.   Neurological:  Negative for dizziness, light-headedness and headaches.   Psychiatric/Behavioral:  Negative for behavioral problems and confusion.    Objective:     Vital Signs (Most Recent):  Temp: 97.8 °F (36.6 °C) (08/24/22 1450)  Pulse: 69 (08/24/22 1450)  Resp: 19 (08/24/22 1450)  BP: (!) 105/49 (08/24/22 1450)  SpO2: 97 % (08/24/22 1450)   Vital Signs (24h Range):  Temp:  [96.1 °F (35.6 °C)-98.2 °F (36.8 °C)] 97.8 °F (36.6 °C)  Pulse:  [] 69  Resp:  [18-21] 19  SpO2:  [92 %-100 %] 97 %  BP: (105-160)/(49-74) 105/49     Weight: (!) 143.8 kg (317 lb 0.3 oz)  Body mass index is 41.83 kg/m².    Estimated Creatinine Clearance: 50.1 mL/min (A) (based on SCr of 1.9 mg/dL (H)).    Physical Exam  Constitutional:        General: He is not in acute distress.     Appearance: Normal appearance. He is well-developed. He is not ill-appearing or diaphoretic.   HENT:      Head: Normocephalic and atraumatic.      Right Ear: External ear normal.      Left Ear: External ear normal.      Nose: Nose normal.   Eyes:      General: No scleral icterus.        Right eye: No discharge.         Left eye: No discharge.      Extraocular Movements: Extraocular movements intact.      Conjunctiva/sclera: Conjunctivae normal.   Neck:      Comments: R neck erythema   Pulmonary:      Effort: Pulmonary effort is normal. No respiratory distress.      Breath sounds: No stridor.   Musculoskeletal:      Right lower leg: Edema present.      Left lower leg: Edema present.      Comments: B/l LE erythema R>L, warmth and tenderness   Skin:     General: Skin is dry.      Coloration: Skin is not jaundiced or pale.      Findings: Erythema and lesion present.      Comments: Erythema of R chest overlying port  No fluctuance  Atypical appearing nodular skin lesion present   Neurological:      General: No focal deficit present.      Mental Status: He is alert and oriented to person, place, and time. Mental status is at baseline.   Psychiatric:         Mood and Affect: Mood normal.         Behavior: Behavior normal.         Thought Content: Thought content normal.         Judgment: Judgment normal.       Significant Labs: CBC:   Recent Labs   Lab 08/23/22 2044 08/24/22  0303   WBC 6.84 6.72   HGB 8.1* 7.6*   HCT 23.3* 22.7*   * 148*     CMP:   Recent Labs   Lab 08/23/22 2044 08/24/22  0303    141   K 4.1 3.8   CL 99 98   CO2 35* 36*   GLU 98 102   BUN 13 14   CREATININE 1.8* 1.9*   CALCIUM 9.1 9.1   PROT 6.4 6.0   ALBUMIN 2.8* 2.8*   BILITOT 0.9 1.0   ALKPHOS 134 139*   AST 33 18   ALT 18 16   ANIONGAP 8 7*     Microbiology Results (last 7 days)       Procedure Component Value Units Date/Time    Blood culture [[id number]] Collected: 08/24/22 0112    Order  Status: Completed Specimen: Blood from Peripheral, Hand, Right Updated: 08/24/22 0915     Blood Culture, Routine No Growth to date    Blood culture [[id number]] Collected: 08/24/22 0113    Order Status: Completed Specimen: Blood from Peripheral, Lower Arm, Right Updated: 08/24/22 0915     Blood Culture, Routine No Growth to date            Significant Imaging: I have reviewed all pertinent imaging results/findings within the past 24 hours.

## 2022-08-24 NOTE — ASSESSMENT & PLAN NOTE
-- Worsening edema, seems to have progressed more since fluid resuscitation on recent hospitalization.   -- Requiring recent increases in Lasix 20 mg from PRN to daily to 40 mg daily  -- BNP upper level normal. Although likely falsely low in setting of obesity  -- Volume overloaded on exam  -- Lasix 40 mg IV daily. Adjust as clinically indicated  -- Low sodium diet, which he has some non-adherence to per recent cardiology visit  -- Follows closely with cardiology. Scheduled to see 8/29  -- RUE US to rule out DVT. Edema worse c/w LUE

## 2022-08-24 NOTE — TELEPHONE ENCOUNTER
----- Message from Irene Jeffery MA sent at 8/24/2022  7:59 AM CDT -----  Please schedule pt for ABG then pulmonary follow up. Please have patient follow up with cardiologist for heart arrhythmias. Thank you   Routing comment     Clara Howard NP 14 hours ago (5:09 PM)             Left VM informing patient that he has Paroxsymal Atrial fibrillation with RVR and to follow up with cardiology as recommended alternatively we have advised him to go to ER if symptomatic. Informed by nephrology that patient's oxygen saturation is low normal despite high flow oxygen, recommend pt obtains ABG for further workup and follow up after test to consider alternative PAP therapy if patient hypercapnic.

## 2022-08-24 NOTE — ED PROVIDER NOTES
Encounter Date: 8/23/2022       History     Chief Complaint   Patient presents with    Rash     Rash to right shoulder around his port access, denies itching or pain. Pt last radiation treatment 8/1/22. Mass palpable to right neck.      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 Pt has had redness to R neck related to his radiation therapy but developed new redness overlying his port with swelling a few days ago. It is tender. Pt reports chills. He also notes increasing edema to his arms and legs.  Also reports is causing him weakness and that he almost fell prior to arrival today.  His stepdaughter is at bedside and able to provide much of the history.        Review of patient's allergies indicates:   Allergen Reactions    Ciprofloxacin Rash     Diffuse pruritic morbilliform rash developed 3/15/2017 after dose of cipro; previously in 2/2017 he had rash/fevers after initiation of cipro    Zosyn [piperacillin-tazobactam] Rash     Diffuse pruritic morbilliform rash developed 3/15/2017.  Then, 430am dose on 3/16 and rash worsened with SOB/tachypnea but no hypoxemia.     Bacitracin Itching and Rash     Violaceous rash in area of topical Tx.      Past Medical History:   Diagnosis Date    (HFpEF) heart failure with preserved ejection fraction 02/04/2022    NAT (acute kidney injury) 03/19/2017    ALLERGIC RHINITIS     Anemia     Anxiety     Basal cell carcinoma 10/19/2018    forehead and right medial shoulder    Basal cell carcinoma 01/09/2019    left nasal bridge and left posterior ear    Basal cell carcinoma 06/12/2020    left lower medial leg - Efudex    Basal cell carcinoma 11/30/2020    left medial upper eyelid canthus superficial BCC  "   Chronic rhinitis 05/03/2013    Chronic rhinitis 05/03/2013    Coronary artery disease involving native coronary artery of native heart without angina pectoris s/p RCA stent     Cortical cataract of both eyes 03/18/2016    Delayed sleep phase syndrome 03/13/2019    Depression     Erectile dysfunction 03/24/2014    Erectile dysfunction 03/24/2014    Essential hypertension     GERD (gastroesophageal reflux disease) 07/25/2012    Gout, arthritis     Grade III open fracture of left tibia and fibula s/p ex-fix on 7/1/16 and ORIF of left tibia on 7/15 07/06/2016    H/O: iritis     Helicobacter pylori (H. pylori) infection     Treated    Hepatitis     Herpes simplex keratoconjunctivitis 09/30/2015    - on acyclovir - followed by opthalmology, Dr. Uribe     Herpes simplex keratoconjunctivitis 09/30/2015    - on acyclovir - followed by opthalmology, Dr. Uribe     Hyperkalemia 02/28/2017    Hyperlipidemia     Hypogonadism male     Hypogonadism male     Immunodeficiency due to drug therapy 07/08/2022    Keloid cicatrix     Lymphoma     HEAD & NECK    Mixed anxiety and depressive disorder     Morbid obesity     Obstructive sleep apnea on CPAP     CPAP    On home oxygen therapy     Osteoarthritis of left knee 07/25/2012    Paroxysmal atrial fibrillation 07/06/2016    Primary osteoarthritis of left knee 07/25/2012    Prominent aorta 01/25/2016    "RESULTS: THE HEART IS MILDLY ENLARGED WITH A SLIGHTLY PROMINENT AORTA" - Xray Chest PA & Lateral 12-     Prostate cancer 02/15/2016    - followed by urology, Dr. Young     Prostate cancer 02/15/2016    - followed by urology, Dr. Young     PVD (peripheral vascular disease)     Refractive error 03/18/2016    Respiratory failure, unspecified with hypoxia 02/04/2022    SCC (squamous cell carcinoma) 06/29/2021    R trap    SCC (squamous cell carcinoma) 01/10/2022    left lateral ankle    Skin disease     Skin ulcer     Squamous cell " cancer of buccal mucosa 10/2015    chest and forehead    Squamous cell cancer of skin of nose     Traumatic type III open fracture of shaft of left tibia and fibula with nonunion 07/06/2016    Type III open fracture of left tibia and fibula with routine healing 07/06/2016    Vitamin D deficiency disease     Vitreous detachment 03/18/2016     Past Surgical History:   Procedure Laterality Date    ADJACENT TISSUE TRANSFER Left 11/30/2020    Procedure: ADJACENT TISSUE TRANSFER x2 ;  Surgeon: Teresa Cooper MD;  Location: [facility] OR 1ST FLR;  Service: Ophthalmology;  Laterality: Left;  Left glabellar 8.5x11x3 and Left upper eyelid advancement flap [id number]      Cardiac stenting x2      CATARACT EXTRACTION W/  INTRAOCULAR LENS IMPLANT Right 3/29/2016    Dr. Conteh    CATARACT EXTRACTION W/  INTRAOCULAR LENS IMPLANT Left 4/12/2016        COLONOSCOPY N/A 7/23/2020    Procedure: COLONOSCOPY;  Surgeon: Nico Lackey MD;  Location: [facility] (2ND FLR);  Service: Endoscopy;  Laterality: N/A;    DIRECT LARYNGOSCOPY N/A 5/4/2022    Procedure: LARYNGOSCOPY, DIRECT possible biopsy;  Surgeon: Etta Higgins MD;  Location: [facility] OR;  Service: ENT;  Laterality: N/A;  RN PREOP ON 4/29/22. NEEDS T&S DAY OF SURGERY--NF  CONSENTS DAY OF SURGERY    DISSECTION OF NECK Right 5/4/2022    Procedure: RADICAL NECK DISSECTION;  Surgeon: Etta Higgins MD;  Location: [facility] OR;  Service: ENT;  Laterality: Right;  TO FOLLOW DR ALMAZAN    ESOPHAGOGASTRODUODENOSCOPY N/A 7/23/2020    Procedure: EGD (ESOPHAGOGASTRODUODENOSCOPY);  Surgeon: Nico Lackey MD;  Location: [facility] (2ND FLR);  Service: Endoscopy;  Laterality: N/A;  per Dr Lackey-Will proceed with EGD and colonoscopy on the 2nd floor due to his comorbidities including obesity, sleep apnea, restrictive lung disease, coronary artery disease.  has loop recorder      ok to hold Eliquis 2 days per Dr Sandhu-must remain    EXTERNAL FIXATION TIBIAL FRACTURE  "Left 07/01/2016    INSERTION OF IMPLANTABLE LOOP RECORDER  04/07/2017    LEFT HEART CATHETERIZATION Left 1/30/2020    Procedure: Left heart cath;  Surgeon: Benjamin Sandhu MD;  Location: [facility] CATH LAB;  Service: Cardiology;  Laterality: Left;  RN PREOP 1/28/20--Pt starting Plavix loading dose today (8pills)- Dr. Sandhu aware.  Pt has a bandaged "non healing area to LLE"--Dr. Sandhu aware.    LEFT HEART CATHETERIZATION Left 2/14/2020    Procedure: Left heart cath, IVUS guided left main / LAD PCI. Noon start, radial approach;  Surgeon: Miguel Angel Brady MD;  Location: [facility] CATH LAB;  Service: Cardiology;  Laterality: Left;  RN PRE OP 2-7-2020  BMI--45.61    ORIF TIBIA FRACTURE Left 07/15/2016    PROBING OF NASOLACRIMAL DUCT WITH INSERTION OF TUBE Left 11/30/2020    Procedure: PROBING, NASOLACRIMAL DUCT,;  Surgeon: Teresa Cooper MD;  Location: [facility] OR [address] [place] [address];  Service: Ophthalmology;  Laterality: Left;    RADIOACTIVE SEED IMPLANTATION OF PROSTATE N/A 8/8/2018    Procedure: INSERTION, RADIOACTIVE SEED, PROSTATE;  Surgeon: Bipin Thompson MD;  Location: [facility] OR [address] [place] [address];  Service: Urology;  Laterality: N/A;  1 hour    SKIN BIOPSY      Squamous cell cancer removal x3 with Mohs surgery      TONSILLECTOMY      TOTAL KNEE ARTHROPLASTY  10/2012    TRIGGER FINGER RELEASE Right 10/14/2020    Procedure: RELEASE, TRIGGER FINGER - right;  Surgeon: Rad Swift MD;  Location: [facility];  Service: Orthopedics;  Laterality: Right;    trus/bx      ULTRASOUND GUIDANCE  2/14/2020    Procedure: Ultrasound Guidance;  Surgeon: Miguel Angel Brady MD;  Location: [facility] CATH LAB;  Service: Cardiology;;     Family History   Problem Relation Age of Onset    Skin cancer Father     Lung cancer Father     Cancer Father         smoker,     Alzheimer's disease Mother     Hypertension Mother     Cancer Sister         colon, lung cancer     No Known Problems Sister     Cancer Brother         skin cancer, polypectomy     Peripheral " vascular disease Unknown     No Known Problems Maternal Aunt     No Known Problems Maternal Uncle     No Known Problems Paternal Aunt     No Known Problems Paternal Uncle     No Known Problems Maternal Grandmother     No Known Problems Maternal Grandfather     No Known Problems Paternal Grandmother     No Known Problems Paternal Grandfather     Melanoma Neg Hx     Psoriasis Neg Hx     Lupus Neg Hx     Eczema Neg Hx     Amblyopia Neg Hx     Blindness Neg Hx     Cataracts Neg Hx     Diabetes Neg Hx     Glaucoma Neg Hx     Macular degeneration Neg Hx     Retinal detachment Neg Hx     Strabismus Neg Hx     Stroke Neg Hx     Thyroid disease Neg Hx     Acne Neg Hx      Social History     Tobacco Use    Smoking status: Never Smoker    Smokeless tobacco: Never Used   Substance Use Topics    Alcohol use: Yes     Alcohol/week: 2.0 standard drinks     Types: 2 Cans of beer per week     Comment: occasionally, beer    Drug use: Never     Review of Systems  General: + chills.No fever.    Eyes: No visual changes.  Head: No headache.    Integument: No rashes or lesions.  Chest: + shortness of breath.  Cardiovascular: No chest pain.  Abdomen: No abdominal pain.  No nausea or vomiting.  Urinary: No abnormal urination.  Neurologic: No focal weakness.  No numbness.  Hematologic: No easy bruising.  Endocrine: No excessive thirst or urination.    Physical Exam     Initial Vitals [08/23/22 1749]   BP Pulse Resp Temp SpO2   122/60 74 (!) 21 98.1 °F (36.7 °C) 98 %      MAP       --         Physical Exam    Nursing note and vitals reviewed.  Constitutional: He appears well-developed and well-nourished. He is not diaphoretic. No distress.   HENT:   Head: Normocephalic and atraumatic.   Eyes: Conjunctivae and EOM are normal.   Neck: Neck supple.   Normal range of motion.  Cardiovascular: Normal rate and regular rhythm.   Pulmonary/Chest: No respiratory distress.   Redness to R neck  R chest wall with redness and 3  indurated masses overlying the R chest wall port   Abdominal: Abdomen is soft. He exhibits no distension.   Musculoskeletal:         General: Edema present. Normal range of motion.      Cervical back: Normal range of motion and neck supple.     Neurological: He is alert and oriented to person, place, and time. GCS score is 15. GCS eye subscore is 4. GCS verbal subscore is 5. GCS motor subscore is 6.   Skin: Skin is warm and dry.   Psychiatric: He has a normal mood and affect. His behavior is normal. Judgment and thought content normal.             ED Course   Procedures  Labs Reviewed   COMPREHENSIVE METABOLIC PANEL - Abnormal; Notable for the following components:       Result Value    CO2 35 (*)     Creatinine 1.8 (*)     Albumin 2.8 (*)     eGFR 38.8 (*)     All other components within normal limits   CBC W/ AUTO DIFFERENTIAL - Abnormal; Notable for the following components:    RBC 2.23 (*)     Hemoglobin 8.1 (*)     Hematocrit 23.3 (*)      (*)     MCH 36.3 (*)     RDW 17.6 (*)     Platelets 145 (*)     Lymph # 0.6 (*)     Gran % 77.7 (*)     Lymph % 9.1 (*)     All other components within normal limits   SEDIMENTATION RATE - Abnormal; Notable for the following components:    Sed Rate 49 (*)     All other components within normal limits   C-REACTIVE PROTEIN - Abnormal; Notable for the following components:    CRP 43.7 (*)     All other components within normal limits   B-TYPE NATRIURETIC PEPTIDE   SARS-COV-2 RNA AMPLIFICATION, QUAL        ECG Results          EKG 12-lead (Final result)  Result time 08/24/22 17:18:11    Final result by Interface, Lab In [facility] (08/24/22 17:18:11)                 Narrative:    Test Reason : R06.02,    Vent. Rate : 071 BPM     Atrial Rate : 071 BPM     P-R Int : 222 ms          QRS Dur : 094 ms      QT Int : 420 ms       P-R-T Axes : 094 040 -50 degrees     QTc Int : 456 ms    Sinus rhythm with 1st degree A-V block  T wave abnormality, consider inferior ischemia  Low septal  forces  Abnormal ECG  When compared with ECG of 10-AUG-2022 23:12,  Vent. rate has decreased BY  46 BPM  T wave inversion now evident in Inferior leads  Confirmed by Mo EAGLE MD ([id number]) on 8/24/2022 5:17:55 PM    Referred By: SELINA   SELF           Confirmed By:Mo EAGLE MD                            Imaging Results           US Chest Mediastinum (Final result)  Result time 08/24/22 01:03:37    Final result by Abdirizak Valadez MD (08/24/22 01:03:37)                 Impression:      1. Heterogeneous collection about the right chest port, could represent hematoma.  This report was flagged in Epic as abnormal.    Electronically signed by resident: Stacey Wall  Date:    08/24/2022  Time:    00:42    Electronically signed by: Abdirizak Valadez MD  Date:    08/24/2022  Time:    01:03             Narrative:    EXAMINATION:  US CHEST MEDIASTINUM    CLINICAL HISTORY:  swelling overlying port in R chest wall;.    COMPARISON:  Chest radiograph 08/23/2022.  CT chest 02/22/2022.    TECHNIQUE:  Limited ultrasound evaluation of right chest wall    FINDINGS:  Heterogeneous collection about the right chest port measures approximately 3.8 x 1.5 x 2.8 cm.                               X-Ray Chest AP Portable (Final result)  Result time 08/23/22 21:36:10    Final result by Abdirizak Valadez MD (08/23/22 21:36:10)                 Impression:      Lordotic positioning. Right-sided port catheter tip overlies the right atrium. Loop recorder device overlies the left hemithorax.    Underinflated lungs with hypoventilatory change.  Subsegmental atelectasis at the lung bases. Calcified pleural plaques, similar to prior.      Electronically signed by: Abdirizak Valadez MD  Date:    08/23/2022  Time:    21:36             Narrative:    EXAMINATION:  XR CHEST AP PORTABLE    CLINICAL HISTORY:  shortness of breath, edema;    TECHNIQUE:  Single frontal view of the chest was performed.    COMPARISON:  08/10/2022.    FINDINGS:  Lordotic  positioning.  Right-sided port catheter tip overlies the right atrium.  Loop recorder device overlies the left hemithorax.    Underinflated lungs with hypoventilatory change.  Subsegmental atelectasis at the lung bases.  Calcified pleural plaques, similar to prior.    Heart and lungs otherwise appear unchanged when allowing for differences in technique and positioning.                                 Medications   sodium chloride 0.9% flush 10 mL (has no administration in time range)   naloxone 0.4 mg/mL injection 0.02 mg (has no administration in time range)   glucose chewable tablet 16 g (has no administration in time range)   glucose chewable tablet 24 g (has no administration in time range)   dextrose 10% bolus 125 mL (has no administration in time range)   dextrose 10% bolus 250 mL (has no administration in time range)   glucagon (human recombinant) injection 1 mg (has no administration in time range)   acetaminophen tablet 650 mg (has no administration in time range)   senna-docusate 8.6-50 mg per tablet 1 tablet (1 tablet Oral Given 8/24/22 1243)   ondansetron injection 4 mg (has no administration in time range)   promethazine tablet 25 mg (has no administration in time range)   simethicone chewable tablet 80 mg (has no administration in time range)   aluminum-magnesium hydroxide-simethicone 200-200-20 mg/5 mL suspension 30 mL (has no administration in time range)   vancomycin - pharmacy to dose (has no administration in time range)   furosemide injection 40 mg (40 mg Intravenous Given 8/24/22 0245)   acyclovir capsule 200 mg (200 mg Oral Given 8/24/22 2036)   amiodarone tablet 200 mg (200 mg Oral Given 8/24/22 0900)   atorvastatin tablet 40 mg (40 mg Oral Given 8/24/22 2036)   gabapentin capsule 200 mg (200 mg Oral Given 8/24/22 0900)   HYDROcodone-acetaminophen 7.5-325 mg per tablet 1 tablet (has no administration in time range)   melatonin tablet 9 mg (9 mg Oral Given 8/24/22 2036)   metoprolol succinate  (TOPROL-XL) 24 hr tablet 25 mg (25 mg Oral Given 8/24/22 0900)   oxybutynin 24 hr tablet 5 mg (5 mg Oral Given 8/24/22 2037)   pantoprazole EC tablet 40 mg (40 mg Oral Given 8/24/22 0859)   tamsulosin 24 hr capsule 0.4 mg (0.4 mg Oral Given 8/24/22 2037)   venlafaxine tablet 75 mg (75 mg Oral Given 8/24/22 2037)   cefTRIAXone (ROCEPHIN) 1 g/50 mL D5W IVPB (0 g Intravenous Stopped 8/24/22 1315)   heparin 25,000 units in dextrose 5% 250 mL (100 units/mL) infusion HIGH INTENSITY nomogram Anti- Xa (18 Units/kg/hr × 105.5 kg (Adjusted) Intravenous New Bag 8/24/22 1705)   heparin 25,000 units in dextrose 5% (100 units/ml) IV bolus from bag - ADDITIONAL PRN BOLUS - 60 units/kg (has no administration in time range)   heparin 25,000 units in dextrose 5% (100 units/ml) IV bolus from bag - ADDITIONAL PRN BOLUS - 30 units/kg (has no administration in time range)   artificial tears 0.5 % ophthalmic solution 1 drop (has no administration in time range)   vancomycin 2 g in dextrose 5 % 500 mL IVPB (0 mg Intravenous Stopped 8/24/22 0158)   heparin 25,000 units in dextrose 5% (100 units/ml) IV bolus from bag INITIAL BOLUS (8,440 Units Intravenous Bolus from Bag 8/24/22 1707)     Medical Decision Making:   History:   Old Medical Records: I decided to obtain old medical records.  Old Records Summarized: records from clinic visits and records from previous admission(s).  Differential Diagnosis:   Abscess, scarring, dermatitis, phlebitis  Clinical Tests:   Lab Tests: Ordered and Reviewed  Radiological Study: Ordered  ED Management:  Admitted to [facility] onc for likely abscess overlying port a cath to R chest wall  Last had chemo 8/1/22  Pt does not appear toxic or septic                      Clinical Impression:   Final diagnoses:  [R06.02] Shortness of breath  [L58.9] Radiation dermatitis  [L02.213] Chest wall abscess (Primary)  [Z95.828] Port-A-Cath in place          ED Disposition Condition    Admit               Bhavana Abdalla  MD  08/24/22 2927

## 2022-08-24 NOTE — FIRST PROVIDER EVALUATION
Medical screening exam completed.  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  He also has a right chest wall port.  He is presenting for a new rash over his right chest wall and neck past few days.  Patient also notes increasing swelling to his hands and legs.  It looks like through the EMR there are multiple communications with patient's providers and they were worried given the location of the rash and it is associated with his port that maybe it was a port infection    Vitals:    08/23/22 1749   BP: 122/60   Pulse: 74   Resp: (!) 21   Temp: 98.1 °F (36.7 °C)   TempSrc: Oral   SpO2: 98%       Pertinent physical exam:  Patient with significant erythema, warmth over his right neck, and right chest wall, including the area over his port.  This area is mildly tender    Brief workup plan:  Unclear whether this is cellulitis or extension of his underlying squamous cell carcinoma or some sort of radiation dermatitis.  Will order labs, ESR, CRP    Preliminary workup initiated; this workup will be continued and followed by the physician or advanced practice provider that is assigned to the patient when roomed.

## 2022-08-24 NOTE — ASSESSMENT & PLAN NOTE
-- Suspect likely infection of right chest venous access port  -- No evidence of sepsis  -- Hemodynamically stable  -- Slightly elevated inflammatory markers  -- Two small indurated bumps concerning for possible abscesses    PLAN:  -- Continue vancomycin. De-escalate as indicated  -- F/u BCx (although drawn after administration of vanc)  -- Consider consulting ID, especially if BCx positive for Staphylococcus aureus, Pseudomonas aeruginosa, drug-resistant gram-negative bacilli, or Candida spp  -- Catheter removal may be warranted in the setting of subcutaneous port reservoir infection  -- Soft tissue ultrasound to eval for possible abscesses. May need I&D   -- Closely monitor hemodynamics and fever curve

## 2022-08-24 NOTE — HPI
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 Completed chemoradiation therapy on 8/1/22. Pt endorses some erythema to R side of neck that he attributed to radiation treatments, however states this progressed to involve the area overlying his port, with sudden development of tender nodular lesion on chest. He denies any fevers at home. Also endorses increased redness of R>L lower extremities w/ increased swelling and tenderness. Of note, recently completed a 7 day course of PO vancomycin for c. Diff infection, not currently complaining of diarrhea. Blood cultures obtained, and patient has been started on vanc and ceftriaxone for possible port infection. US of chest shows fluid collection surrounding port. ID consulted for assistance.

## 2022-08-24 NOTE — PROGRESS NOTES
Pharmacokinetic Initial Assessment: IV Vancomycin    Assessment/Plan:    Initiate intravenous vancomycin with loading dose of 2000 mg once with subsequent doses when random concentrations are less than 20 mcg/mL  Desired empiric serum trough concentration is 10 to 15 mcg/mL  Draw vancomycin random level on 8/24/2022 at 2200.  Pharmacy will continue to follow and monitor vancomycin.      Please contact pharmacy at extension [phone] with any questions regarding this assessment.     Thank you for the consult,   Nii Lozada, Pharm.D.  Inpatient Pharmacist      Patient brief summary:  Janusz Montgomery Jr. is a 77 y.o. male initiated on antimicrobial therapy with IV Vancomycin for treatment of suspected skin & soft tissue infection -- infection of right chest venous access port    Drug Allergies:   Review of patient's allergies indicates:   Allergen Reactions    Ciprofloxacin Rash     Diffuse pruritic morbilliform rash developed 3/15/2017 after dose of cipro; previously in 2/2017 he had rash/fevers after initiation of cipro    Zosyn [piperacillin-tazobactam] Rash     Diffuse pruritic morbilliform rash developed 3/15/2017.  Then, 430am dose on 3/16 and rash worsened with SOB/tachypnea but no hypoxemia.     Bacitracin Itching and Rash     Violaceous rash in area of topical Tx.        Actual Body Weight:   143 kg  BMI: 41.83    Renal Function:   Estimated Creatinine Clearance: 50.1 mL/min (A) (based on SCr of 1.9 mg/dL (H)).,     Dialysis Method (if applicable):  N/A    CBC (last 72 hours):  Recent Labs   Lab Result Units 08/22/22  1240 08/23/22  2044 08/24/22  0303   WBC K/uL 6.06 6.84 6.72   Hemoglobin g/dL 7.4* 8.1* 7.6*   Hematocrit % 22.1* 23.3* 22.7*   Platelets K/uL 135* 145* 148*   Gran % % 79.9* 77.7* 81.2*   Lymph % % 9.4* 9.1* 8.3*   Mono % % 8.6 11.0 8.3   Eosinophil % % 1.5 1.3 1.5   Basophil % % 0.3 0.6 0.4   Differential Method  Automated Automated Automated       Metabolic Panel (last 72 hours):  Recent  Labs   Lab Result Units 08/22/22  1227 08/22/22  1240 08/23/22  2044 08/24/22  0303   Sodium mmol/L  --  141 142 141   Potassium mmol/L  --  3.6 4.1 3.8   Chloride mmol/L  --  99 99 98   CO2 mmol/L  --  34* 35* 36*   Glucose mg/dL  --  96 98 102   Glucose, UA  Negative  --   --   --    BUN mg/dL  --  13 13 14   Creatinine mg/dL  --  1.8* 1.8* 1.9*   Creatinine, Urine mg/dL 117.0  --   --   --    Albumin g/dL  --  2.9* 2.8* 2.8*   Total Bilirubin mg/dL  --   --  0.9 1.0   Alkaline Phosphatase U/L  --   --  134 139*   AST U/L  --   --  33 18   ALT U/L  --   --  18 16   Magnesium mg/dL  --   --   --  1.5*   Phosphorus mg/dL  --  2.9  --  2.9       Drug levels (last 3 results):  No results for input(s): VANCOMYCINRA, VANCORANDOM, VANCOMYCINPE, VANCOPEAK, VANCOMYCINTR, VANCOTROUGH in the last 72 hours.    Microbiologic Results:  Microbiology Results (last 7 days)     Procedure Component Value Units Date/Time    Blood culture [[id number]] Collected: 08/24/22 0113    Order Status: Sent Specimen: Blood from Peripheral, Lower Arm, Right Updated: 08/24/22 0119    Blood culture [[id number]] Collected: 08/24/22 0112    Order Status: Sent Specimen: Blood from Peripheral, Hand, Right Updated: 08/24/22 0119

## 2022-08-24 NOTE — CONSULTS
[facility] ([facility])  Infectious Disease  Consult Note    Patient Name: Janusz Montgomery Jr.  MRN: [id number]  Admission Date: 8/23/2022  Hospital Length of Stay: 0 days  Attending Physician: Mirlande Gupta MD  Primary Care Provider: Miguelina Weathers MD     Isolation Status: No active isolations    Patient information was obtained from patient, relative(s) and ER records.      Inpatient consult to Infectious Diseases  Consult performed by: Melida Santana DO  Consult ordered by: Jamila Kennedy DO        Assessment/Plan:     * Infection of venous access port  77M hx squamous cell H/N cancer s/p radical neck dissection and chemoradiation who presents w/ possible port infection. R chest port w/ overlying erythema and new nodular skin lesions present. Nodular lesions are atypical, and concerning for malignancy vs. Atypical infection (nocardia, fungal, mycobacterial). Blood cx NGTD. Chest US w/ fluid collection surrounding port. Pt on vanc and ceftriaxone.     Recommendations:  - recommend port removal, send cultures from swab of pocket at time of removal - gram stain, aerobic, anaerobic, fungal, AFB, and modified AFB  - would also obtain tissue biopsy of nodular lesion to send for path given atypical appearance - r/o malignancy, send for GMS and AFB stains  - follow up blood cultures  - continue vanc and ceftriaxone for now  - no diarrhea - would consider restarting PO vancomycin 125mg BID while on systemic antibiotics to prevent c. Diff recurrence      Discussed with med/onc team    Thank you for your consult. I will follow-up with patient. Please contact us if you have any additional questions.    Sharon Santana DO  Transplant Infectious Disease    Time: 70 minutes   50% of time spent on face-to-face counseling and coordination of care. Counseling included review of test results, diagnosis, and treatment plan with patient and/or family.        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 Completed chemoradiation therapy on 8/1/22. Pt endorses some erythema to R side of neck that he attributed to radiation treatments, however states this progressed to involve the area overlying his port, with sudden development of tender nodular lesion on chest. He denies any fevers at home. Also endorses increased redness of R>L lower extremities w/ increased swelling and tenderness. Of note, recently completed a 7 day course of PO vancomycin for c. Diff infection, not currently complaining of diarrhea. Blood cultures obtained, and patient has been started on vanc and ceftriaxone for possible port infection. US of chest shows fluid collection surrounding port. ID consulted for assistance.       Past Medical History:   Diagnosis Date    (HFpEF) heart failure with preserved ejection fraction 02/04/2022    NAT (acute kidney injury) 03/19/2017    ALLERGIC RHINITIS     Anemia     Anxiety     Basal cell carcinoma 10/19/2018    forehead and right medial shoulder    Basal cell carcinoma 01/09/2019    left nasal bridge and left posterior ear    Basal cell carcinoma 06/12/2020    left lower medial leg - Efudex    Basal cell carcinoma 11/30/2020    left medial upper eyelid canthus superficial BCC    Chronic rhinitis 05/03/2013    Chronic rhinitis 05/03/2013    Coronary artery disease involving native coronary artery of native heart without angina pectoris s/p RCA stent     Cortical cataract of both eyes 03/18/2016    Delayed sleep phase syndrome 03/13/2019    Depression     Erectile dysfunction 03/24/2014    Erectile dysfunction 03/24/2014    Essential hypertension     GERD (gastroesophageal reflux disease) 07/25/2012    Gout, arthritis     Grade III open fracture of left tibia and fibula s/p ex-fix on 7/1/16  "and ORIF of left tibia on 7/15 07/06/2016    H/O: iritis     Helicobacter pylori (H. pylori) infection     Treated    Hepatitis     Herpes simplex keratoconjunctivitis 09/30/2015    - on acyclovir - followed by opthalmology, Dr. Uribe     Herpes simplex keratoconjunctivitis 09/30/2015    - on acyclovir - followed by opthalmologyDr. Uribe     Hyperkalemia 02/28/2017    Hyperlipidemia     Hypogonadism male     Hypogonadism male     Immunodeficiency due to drug therapy 07/08/2022    Keloid cicatrix     Lymphoma     HEAD & NECK    Mixed anxiety and depressive disorder     Morbid obesity     Obstructive sleep apnea on CPAP     CPAP    On home oxygen therapy     Osteoarthritis of left knee 07/25/2012    Paroxysmal atrial fibrillation 07/06/2016    Primary osteoarthritis of left knee 07/25/2012    Prominent aorta 01/25/2016    "RESULTS: THE HEART IS MILDLY ENLARGED WITH A SLIGHTLY PROMINENT AORTA" - Xray Chest PA & Lateral 12-     Prostate cancer 02/15/2016    - followed by urology, Dr. Young     Prostate cancer 02/15/2016    - followed by urology, Dr. 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    chest and forehead    Squamous cell cancer of skin of nose     Traumatic type III open fracture of shaft of left tibia and fibula with nonunion 07/06/2016    Type III open fracture of left tibia and fibula with routine healing 07/06/2016    Vitamin D deficiency disease     Vitreous detachment 03/18/2016       Past Surgical History:   Procedure Laterality Date    ADJACENT TISSUE TRANSFER Left 11/30/2020    Procedure: ADJACENT TISSUE TRANSFER x2 ;  Surgeon: Teresa Cooper MD;  Location: [facility]" "OR 1ST FLR;  Service: Ophthalmology;  Laterality: Left;  Left glabellar 8.5x11x3 and Left upper eyelid advancement flap [id number]      Cardiac stenting x2      CATARACT EXTRACTION W/  INTRAOCULAR LENS IMPLANT Right 3/29/2016    Dr. Conteh    CATARACT EXTRACTION W/  INTRAOCULAR LENS IMPLANT Left 4/12/2016        COLONOSCOPY N/A 7/23/2020    Procedure: COLONOSCOPY;  Surgeon: Nico Lackey MD;  Location: [facility] (2ND FLR);  Service: Endoscopy;  Laterality: N/A;    DIRECT LARYNGOSCOPY N/A 5/4/2022    Procedure: LARYNGOSCOPY, DIRECT possible biopsy;  Surgeon: Etta Higgins MD;  Location: [facility] OR;  Service: ENT;  Laterality: N/A;  RN PREOP ON 4/29/22. NEEDS T&S DAY OF SURGERY--NF  CONSENTS DAY OF SURGERY    DISSECTION OF NECK Right 5/4/2022    Procedure: RADICAL NECK DISSECTION;  Surgeon: Etta Higgins MD;  Location: [facility] OR;  Service: ENT;  Laterality: Right;  TO FOLLOW DR ALMAZAN    ESOPHAGOGASTRODUODENOSCOPY N/A 7/23/2020    Procedure: EGD (ESOPHAGOGASTRODUODENOSCOPY);  Surgeon: Nico Lackey MD;  Location: [facility] (2ND FLR);  Service: Endoscopy;  Laterality: N/A;  per Dr Lackey-Will proceed with EGD and colonoscopy on the 2nd floor due to his comorbidities including obesity, sleep apnea, restrictive lung disease, coronary artery disease.  has loop recorder      ok to hold Eliquis 2 days per Dr Sandhu-must remain    EXTERNAL FIXATION TIBIAL FRACTURE Left 07/01/2016    INSERTION OF IMPLANTABLE LOOP RECORDER  04/07/2017    LEFT HEART CATHETERIZATION Left 1/30/2020    Procedure: Left heart cath;  Surgeon: Benjamin Sandhu MD;  Location: [facility] CATH LAB;  Service: Cardiology;  Laterality: Left;  RN PREOP 1/28/20--Pt starting Plavix loading dose today (8pills)- Dr. Sanduh aware.  Pt has a bandaged "non healing area to LLE"--Dr. Sandhu aware.    LEFT HEART CATHETERIZATION Left 2/14/2020    Procedure: Left heart cath, IVUS guided left main / LAD PCI. Noon start, radial approach;  " Surgeon: Miguel Angel Brady MD;  Location: [facility] CATH LAB;  Service: Cardiology;  Laterality: Left;  RN PRE OP 2-7-2020  BMI--45.61    ORIF TIBIA FRACTURE Left 07/15/2016    PROBING OF NASOLACRIMAL DUCT WITH INSERTION OF TUBE Left 11/30/2020    Procedure: PROBING, NASOLACRIMAL DUCT,;  Surgeon: Teresa Cooper MD;  [facility];  Service: Ophthalmology;  Laterality: Left;    RADIOACTIVE SEED IMPLANTATION OF PROSTATE N/A 8/8/2018    Procedure: INSERTION, RADIOACTIVE SEED, PROSTATE;  Surgeon: Bipin Thompson MD;  [facility];  Service: Urology;  Laterality: N/A;  1 hour    SKIN BIOPSY      Squamous cell cancer removal x3 with Mohs surgery      TONSILLECTOMY      TOTAL KNEE ARTHROPLASTY  10/2012    TRIGGER FINGER RELEASE Right 10/14/2020    Procedure: RELEASE, TRIGGER FINGER - right;  Surgeon: Rad Swift MD;  Location: [facility] OR;  Service: Orthopedics;  Laterality: Right;    trus/bx      ULTRASOUND GUIDANCE  2/14/2020    Procedure: Ultrasound Guidance;  Surgeon: Miguel Angel Brady MD;  Location: [facility] CATH LAB;  Service: Cardiology;;       Review of patient's allergies indicates:   Allergen Reactions    Ciprofloxacin Rash     Diffuse pruritic morbilliform rash developed 3/15/2017 after dose of cipro; previously in 2/2017 he had rash/fevers after initiation of cipro    Zosyn [piperacillin-tazobactam] Rash     Diffuse pruritic morbilliform rash developed 3/15/2017.  Then, 430am dose on 3/16 and rash worsened with SOB/tachypnea but no hypoxemia.     Bacitracin Itching and Rash     Violaceous rash in area of topical Tx.        Medications:  Medications Prior to Admission   Medication Sig    acyclovir (ZOVIRAX) 800 MG Tab Take 200 mg by mouth 2 (two) times daily.    amiodarone (PACERONE) 200 MG Tab Take 1 tablet (200 mg total) by mouth once daily.    artificial tears (ISOPTO TEARS) 0.5 % ophthalmic solution Place 1 drop into both eyes as needed.    atorvastatin (LIPITOR) 40 MG tablet Take  1 tablet (40 mg total) by mouth once daily. (Patient taking differently: Take 40 mg by mouth every evening.)    clopidogreL (PLAVIX) 75 mg tablet Take 1 tablet by mouth once daily (Patient taking differently: Take by mouth once daily.)    ELIQUIS 5 mg Tab Take 1 tablet by mouth twice daily    furosemide (LASIX) 20 MG tablet Take 2 tablets (40 mg total) by mouth once daily.    gabapentin (NEURONTIN) 100 MG capsule 2 capsules once daily    melatonin 5 mg Tab Take 10 mg by mouth nightly.    metoprolol succinate (TOPROL-XL) 25 MG 24 hr tablet Take 1 tablet by mouth once daily (Patient taking differently: Take by mouth every morning.)    oxybutynin (DITROPAN-XL) 5 MG TR24 Take 1 tablet (5 mg total) by mouth every evening.    tamsulosin (FLOMAX) 0.4 mg Cap Take 1 capsule (0.4 mg total) by mouth every evening.    vancomycin 125 mg/5 mL Soln Take 5 mLs (125 mg total) by mouth every 6 (six) hours. for 7 days    venlafaxine (EFFEXOR) 75 MG tablet Take 1 tablet (75 mg total) by mouth every evening.    vitamin D 1000 units Tab Take 1 tablet (1,000 Units total) by mouth once daily. (Patient taking differently: Take 2,000 Units by mouth once daily.)    dexAMETHasone (DECADRON) 4 MG Tab 4mg (1 tablet) by mouth twice a day for two days starting the day after chemo.  Repeat after each dose of chemo.    diphenhydrAMINE-aluminum-magnesium hydroxide-simethicone-LIDOcaine HCl 2% Swish and spit 15 mLs every 4 (four) hours as needed.    gly-carb h-poly a-pot hydrox (MUGARD) Soln by Mucous Membrane route.    hydrocodone-acetaminophen (HYCET) solution 7.5-325 mg/15mL Take 15 mLs by mouth every 8 (eight) hours as needed for Pain.    LIDOcaine-prilocaine (EMLA) cream Apply generously to port site 30-60 min prior to chemo and then cover with saran wrap.    nitroGLYCERIN (NITROSTAT) 0.4 MG SL tablet Place 1 tablet (0.4 mg total) under the tongue every 5 (five) minutes as needed for Chest pain.    pantoprazole (PROTONIX) 40 MG  "tablet Take 1 tablet (40 mg total) by mouth once daily.    silver sulfADIAZINE 1% (SILVADENE) 1 % cream Apply topically.    sucralfate (CARAFATE) 100 mg/mL suspension Take 10 mLs (1 g total) by mouth 4 (four) times daily before meals and nightly.     Antibiotics (From admission, onward)                Start     Stop Route Frequency Ordered    08/24/22 1215  cefTRIAXone (ROCEPHIN) 1 g/50 mL D5W IVPB         -- IV Every 24 hours (non-standard times) 08/24/22 1113    08/24/22 0151  vancomycin - pharmacy to dose  (vancomycin IVPB)        "And" Linked Group Details    -- IV pharmacy to manage frequency 08/24/22 0051          Antifungals (From admission, onward)                None          Antivirals (From admission, onward)          Stop Route Frequency     acyclovir         -- Oral 2 times daily             Immunization History   Administered Date(s) Administered    COVID-19, MRNA, LN-S, PF (Pfizer) (Purple Cap) 01/07/2021, 01/28/2021, 11/23/2021    Influenza 10/06/2008, 12/02/2011, 12/08/2012    Influenza (FLUAD) - Quadrivalent - Adjuvanted - PF *Preferred* (65+) 09/30/2020, 12/16/2021    Influenza - High Dose - PF (65 years and older) 12/17/2015, 10/24/2016, 12/06/2017, 10/30/2018, 11/20/2019    Influenza Split 10/06/2008, 12/02/2011, 12/08/2012, 12/08/2012, 12/08/2012    PPD Test 08/01/2016, 03/19/2017    Pneumococcal Conjugate - 13 Valent 09/30/2015    Pneumococcal Polysaccharide - 23 Valent 06/06/2014    Tdap 08/21/2020    Zoster 06/01/2009    Zoster Recombinant 09/30/2020, 03/16/2021       Family History       Problem Relation (Age of Onset)    Alzheimer's disease Mother    Cancer Father, Sister, Brother    Hypertension Mother    Lung cancer Father    No Known Problems Sister, Maternal Aunt, Maternal Uncle, Paternal Aunt, Paternal Uncle, Maternal Grandmother, Maternal Grandfather, Paternal Grandmother, Paternal Grandfather    Peripheral vascular disease     Skin cancer Father          Social History "     Socioeconomic History    Marital status:    Occupational History    Occupation: Retired    Tobacco Use    Smoking status: Never Smoker    Smokeless tobacco: Never Used   Substance and Sexual Activity    Alcohol use: Yes     Alcohol/week: 2.0 standard drinks     Types: 2 Cans of beer per week     Comment: occasionally, beer    Drug use: Never     Social Determinants of Health     Financial Resource Strain: Low Risk     Difficulty of Paying Living Expenses: Not hard at all   Food Insecurity: No Food Insecurity    Worried About Running Out of Food in the Last Year: Never true    Ran Out of Food in the Last Year: Never true   Transportation Needs: No Transportation Needs    Lack of Transportation (Medical): No    Lack of Transportation (Non-Medical): No   Physical Activity: Inactive    Days of Exercise per Week: 0 days    Minutes of Exercise per Session: 0 min   Stress: No Stress Concern Present    Feeling of Stress : Not at all   Social Connections: Unknown    Frequency of Communication with Friends and Family: More than three times a week    Frequency of Social Gatherings with Friends and Family: Twice a week    Active Member of Clubs or Organizations: Yes    Attends Club or Organization Meetings: More than 4 times per year    Marital Status:    Housing Stability: Low Risk     Unable to Pay for Housing in the Last Year: No    Number of Places Lived in the Last Year: 1    Unstable Housing in the Last Year: No     Review of Systems   Constitutional:  Positive for appetite change. Negative for activity change, chills and fever.   HENT:  Negative for congestion, dental problem, ear pain and rhinorrhea.    Eyes:  Negative for pain and discharge.   Respiratory:  Negative for cough and shortness of breath.    Cardiovascular:  Negative for chest pain and leg swelling.   Gastrointestinal:  Negative for abdominal distention, abdominal pain, constipation, diarrhea, nausea and vomiting.    Genitourinary:  Negative for difficulty urinating and dysuria.   Musculoskeletal:  Negative for arthralgias, back pain and joint swelling.   Skin:  Positive for color change. Negative for rash and wound.        L heel chronic wound   Allergic/Immunologic: Positive for immunocompromised state.   Neurological:  Negative for dizziness, light-headedness and headaches.   Psychiatric/Behavioral:  Negative for behavioral problems and confusion.    Objective:     Vital Signs (Most Recent):  Temp: 97.8 °F (36.6 °C) (08/24/22 1450)  Pulse: 69 (08/24/22 1450)  Resp: 19 (08/24/22 1450)  BP: (!) 105/49 (08/24/22 1450)  SpO2: 97 % (08/24/22 1450)   Vital Signs (24h Range):  Temp:  [96.1 °F (35.6 °C)-98.2 °F (36.8 °C)] 97.8 °F (36.6 °C)  Pulse:  [] 69  Resp:  [18-21] 19  SpO2:  [92 %-100 %] 97 %  BP: (105-160)/(49-74) 105/49     Weight: (!) 143.8 kg (317 lb 0.3 oz)  Body mass index is 41.83 kg/m².    Estimated Creatinine Clearance: 50.1 mL/min (A) (based on SCr of 1.9 mg/dL (H)).    Physical Exam  Constitutional:       General: He is not in acute distress.     Appearance: Normal appearance. He is well-developed. He is not ill-appearing or diaphoretic.   HENT:      Head: Normocephalic and atraumatic.      Right Ear: External ear normal.      Left Ear: External ear normal.      Nose: Nose normal.   Eyes:      General: No scleral icterus.        Right eye: No discharge.         Left eye: No discharge.      Extraocular Movements: Extraocular movements intact.      Conjunctiva/sclera: Conjunctivae normal.   Neck:      Comments: R neck erythema   Pulmonary:      Effort: Pulmonary effort is normal. No respiratory distress.      Breath sounds: No stridor.   Musculoskeletal:      Right lower leg: Edema present.      Left lower leg: Edema present.      Comments: B/l LE erythema R>L, warmth and tenderness   Skin:     General: Skin is dry.      Coloration: Skin is not jaundiced or pale.      Findings: Erythema and lesion present.       Comments: Erythema of R chest overlying port  No fluctuance  Atypical appearing nodular skin lesion present   Neurological:      General: No focal deficit present.      Mental Status: He is alert and oriented to person, place, and time. Mental status is at baseline.   Psychiatric:         Mood and Affect: Mood normal.         Behavior: Behavior normal.         Thought Content: Thought content normal.         Judgment: Judgment normal.       Significant Labs: CBC:   Recent Labs   Lab 08/23/22 2044 08/24/22  0303   WBC 6.84 6.72   HGB 8.1* 7.6*   HCT 23.3* 22.7*   * 148*     CMP:   Recent Labs   Lab 08/23/22 2044 08/24/22  0303    141   K 4.1 3.8   CL 99 98   CO2 35* 36*   GLU 98 102   BUN 13 14   CREATININE 1.8* 1.9*   CALCIUM 9.1 9.1   PROT 6.4 6.0   ALBUMIN 2.8* 2.8*   BILITOT 0.9 1.0   ALKPHOS 134 139*   AST 33 18   ALT 18 16   ANIONGAP 8 7*     Microbiology Results (last 7 days)       Procedure Component Value Units Date/Time    Blood culture [[id number]] Collected: 08/24/22 0112    Order Status: Completed Specimen: Blood from Peripheral, Hand, Right Updated: 08/24/22 0915     Blood Culture, Routine No Growth to date    Blood culture [[id number]] Collected: 08/24/22 0113    Order Status: Completed Specimen: Blood from Peripheral, Lower Arm, Right Updated: 08/24/22 0915     Blood Culture, Routine No Growth to date            Significant Imaging: I have reviewed all pertinent imaging results/findings within the past 24 hours.

## 2022-08-24 NOTE — CONSULTS
Consult acknowledged, HPI to follow    Tavia Gillespie MD  PGY-II  Diagnostic and Interventional Radiology  [facility]

## 2022-08-24 NOTE — ASSESSMENT & PLAN NOTE
-- Squamous cell carcinoma of the right head and neck s/p radical neck dissection and chemoradiation with carboplatin, completed 8/1/22. Follows with Dr. Hargrove.

## 2022-08-24 NOTE — ASSESSMENT & PLAN NOTE
77M hx squamous cell H/N cancer s/p radical neck dissection and chemoradiation who presents w/ possible port infection. R chest port w/ overlying erythema and new nodular skin lesions present. Nodular lesions are atypical, and concerning for malignancy vs. Atypical infection (nocardia, fungal, mycobacterial). Blood cx NGTD. Chest US w/ fluid collection surrounding port. Pt on vanc and ceftriaxone.     Recommendations:  - recommend port removal, send cultures from swab of pocket at time of removal - gram stain, aerobic, anaerobic, fungal, AFB, and modified AFB  - would also obtain tissue biopsy of nodular lesion to send for path given atypical appearance - r/o malignancy, send for GMS and AFB stains  - follow up blood cultures  - continue vanc and ceftriaxone for now  - no diarrhea - would consider restarting PO vancomycin 125mg BID while on systemic antibiotics to present c. Diff recurrence

## 2022-08-24 NOTE — SUBJECTIVE & OBJECTIVE
Oncology Treatment Plan:   OP CARBOPLATIN WEEKLY    Medications:  Continuous Infusions:  Scheduled Meds:   [START ON 8/24/2022] vancomycin (VANCOCIN) IVPB  2,000 mg Intravenous ED 1 Time     PRN Meds:     Review of patient's allergies indicates:   Allergen Reactions    Ciprofloxacin Rash     Diffuse pruritic morbilliform rash developed 3/15/2017 after dose of cipro; previously in 2/2017 he had rash/fevers after initiation of cipro    Zosyn [piperacillin-tazobactam] Rash     Diffuse pruritic morbilliform rash developed 3/15/2017.  Then, 430am dose on 3/16 and rash worsened with SOB/tachypnea but no hypoxemia.     Bacitracin Itching and Rash     Violaceous rash in area of topical Tx.         Past Medical History:   Diagnosis Date    (HFpEF) heart failure with preserved ejection fraction 02/04/2022    NAT (acute kidney injury) 03/19/2017    ALLERGIC RHINITIS     Anemia     Anxiety     Basal cell carcinoma 10/19/2018    forehead and right medial shoulder    Basal cell carcinoma 01/09/2019    left nasal bridge and left posterior ear    Basal cell carcinoma 06/12/2020    left lower medial leg - Efudex    Basal cell carcinoma 11/30/2020    left medial upper eyelid canthus superficial BCC    Chronic rhinitis 05/03/2013    Chronic rhinitis 05/03/2013    Coronary artery disease involving native coronary artery of native heart without angina pectoris s/p RCA stent     Cortical cataract of both eyes 03/18/2016    Delayed sleep phase syndrome 03/13/2019    Depression     Erectile dysfunction 03/24/2014    Erectile dysfunction 03/24/2014    Essential hypertension     GERD (gastroesophageal reflux disease) 07/25/2012    Gout, arthritis     Grade III open fracture of left tibia and fibula s/p ex-fix on 7/1/16 and ORIF of left tibia on 7/15 07/06/2016    H/O: iritis     Helicobacter pylori (H. pylori) infection     Treated    Hepatitis     Herpes simplex keratoconjunctivitis 09/30/2015    - on acyclovir - followed by opthalmology,  "Dr. Uribe     Herpes simplex keratoconjunctivitis 09/30/2015    - on acyclovir - followed by opthalmology, Dr. Uribe     Hyperkalemia 02/28/2017    Hyperlipidemia     Hypogonadism male     Hypogonadism male     Immunodeficiency due to drug therapy 07/08/2022    Keloid cicatrix     Lymphoma     HEAD & NECK    Mixed anxiety and depressive disorder     Morbid obesity     Obstructive sleep apnea on CPAP     CPAP    On home oxygen therapy     Osteoarthritis of left knee 07/25/2012    Paroxysmal atrial fibrillation 07/06/2016    Primary osteoarthritis of left knee 07/25/2012    Prominent aorta 01/25/2016    "RESULTS: THE HEART IS MILDLY ENLARGED WITH A SLIGHTLY PROMINENT AORTA" - Xray Chest PA & Lateral 12-     Prostate cancer 02/15/2016    - followed by urology, Dr. Young     Prostate cancer 02/15/2016    - followed by urology, Dr. 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    chest and forehead    Squamous cell cancer of skin of nose     Traumatic type III open fracture of shaft of left tibia and fibula with nonunion 07/06/2016    Type III open fracture of left tibia and fibula with routine healing 07/06/2016    Vitamin D deficiency disease     Vitreous detachment 03/18/2016     Past Surgical History:   Procedure Laterality Date    ADJACENT TISSUE TRANSFER Left 11/30/2020    Procedure: ADJACENT TISSUE TRANSFER x2 ;  Surgeon: Teresa Cooper MD;  Location: [facility] OR [address] [place] [address];  Service: Ophthalmology;  Laterality: Left;  Left glabellar 8.5x11x3 and Left upper eyelid advancement flap [id number]      Cardiac stenting x2      CATARACT EXTRACTION W/  INTRAOCULAR LENS IMPLANT Right 3/29/2016    Dr. Conteh    CATARACT " "EXTRACTION W/  INTRAOCULAR LENS IMPLANT Left 4/12/2016        COLONOSCOPY N/A 7/23/2020    Procedure: COLONOSCOPY;  Surgeon: Nico Lackey MD;  [facility]  Service: Endoscopy;  Laterality: N/A;    DIRECT LARYNGOSCOPY N/A 5/4/2022    Procedure: LARYNGOSCOPY, DIRECT possible biopsy;  Surgeon: Etta Higgins MD;  Location: [facility] OR;  Service: ENT;  Laterality: N/A;  RN PREOP ON 4/29/22. NEEDS T&S DAY OF SURGERY--NF  CONSENTS DAY OF SURGERY    DISSECTION OF NECK Right 5/4/2022    Procedure: RADICAL NECK DISSECTION;  Surgeon: Etta Higgins MD;  Location: [facility] OR;  Service: ENT;  Laterality: Right;  TO FOLLOW DR ALMAZAN    ESOPHAGOGASTRODUODENOSCOPY N/A 7/23/2020    Procedure: EGD (ESOPHAGOGASTRODUODENOSCOPY);  Surgeon: Nico Lackey MD;  Location: [facility] ([address] [place] [address]);  Service: Endoscopy;  Laterality: N/A;  per Dr Lackey-Will proceed with EGD and colonoscopy on the 2nd floor due to his comorbidities including obesity, sleep apnea, restrictive lung disease, coronary artery disease.  has loop recorder      ok to hold Eliquis 2 days per Dr Sandhu-must remain    EXTERNAL FIXATION TIBIAL FRACTURE Left 07/01/2016    INSERTION OF IMPLANTABLE LOOP RECORDER  04/07/2017    LEFT HEART CATHETERIZATION Left 1/30/2020    Procedure: Left heart cath;  Surgeon: Benjamin Sandhu MD;  Location: [facility] CATH LAB;  Service: Cardiology;  Laterality: Left;  RN PREOP 1/28/20--Pt starting Plavix loading dose today (8pills)- Dr. Sandhu aware.  Pt has a bandaged "non healing area to LLE"--Dr. Sandhu aware.    LEFT HEART CATHETERIZATION Left 2/14/2020    Procedure: Left heart cath, IVUS guided left main / LAD PCI. Noon start, radial approach;  Surgeon: Miguel Angel Brady MD;  Location: [facility] CATH LAB;  Service: Cardiology;  Laterality: Left;  RN PRE OP 2-7-2020  BMI--45.61    ORIF TIBIA FRACTURE Left 07/15/2016    PROBING OF NASOLACRIMAL DUCT WITH INSERTION OF TUBE Left 11/30/2020    Procedure: PROBING, " NASOLACRIMAL DUCT,;  Surgeon: Teresa Cooper MD;  [facility];  Service: Ophthalmology;  Laterality: Left;    RADIOACTIVE SEED IMPLANTATION OF PROSTATE N/A 8/8/2018    Procedure: INSERTION, RADIOACTIVE SEED, PROSTATE;  Surgeon: Bipin Thompson MD;  [facility];  Service: Urology;  Laterality: N/A;  1 hour    SKIN BIOPSY      Squamous cell cancer removal x3 with Mohs surgery      TONSILLECTOMY      TOTAL KNEE ARTHROPLASTY  10/2012    TRIGGER FINGER RELEASE Right 10/14/2020    Procedure: RELEASE, TRIGGER FINGER - right;  Surgeon: Rad Swift MD;  Location: [facility] OR;  Service: Orthopedics;  Laterality: Right;    trus/bx      ULTRASOUND GUIDANCE  2/14/2020    Procedure: Ultrasound Guidance;  Surgeon: Miguel Angel Brady MD;  Location: [facility] CATH LAB;  Service: Cardiology;;     Family History       Problem Relation (Age of Onset)    Alzheimer's disease Mother    Cancer Father, Sister, Brother    Hypertension Mother    Lung cancer Father    No Known Problems Sister, Maternal Aunt, Maternal Uncle, Paternal Aunt, Paternal Uncle, Maternal Grandmother, Maternal Grandfather, Paternal Grandmother, Paternal Grandfather    Peripheral vascular disease     Skin cancer Father          Tobacco Use    Smoking status: Never Smoker    Smokeless tobacco: Never Used   Substance and Sexual Activity    Alcohol use: Yes     Alcohol/week: 2.0 standard drinks     Types: 2 Cans of beer per week     Comment: occasionally, beer    Drug use: Never    Sexual activity: Not on file       Review of Systems   Constitutional:  Positive for chills and fatigue.   HENT:  Negative for sore throat.    Respiratory:  Negative for cough and shortness of breath.    Cardiovascular:  Positive for leg swelling. Negative for chest pain.   Gastrointestinal:  Negative for abdominal pain, constipation, diarrhea, nausea and vomiting.   Genitourinary:  Negative for dysuria.   Musculoskeletal:  Negative for myalgias.   Skin:  Positive for color  change.   Neurological:  Positive for weakness. Negative for dizziness and headaches.   Psychiatric/Behavioral:  Negative for confusion.    Objective:     Vital Signs (Most Recent):  Temp: 98.2 °F (36.8 °C) (08/23/22 2134)  Pulse: 72 (08/23/22 2134)  Resp: 18 (08/23/22 2134)  BP: (!) 118/55 (08/23/22 2134)  SpO2: 98 % (08/23/22 2134)   Vital Signs (24h Range):  Temp:  [98.1 °F (36.7 °C)-98.2 °F (36.8 °C)] 98.2 °F (36.8 °C)  Pulse:  [72-74] 72  Resp:  [18-21] 18  SpO2:  [98 %] 98 %  BP: (118-122)/(55-60) 118/55     Weight: (!) 146.7 kg (323 lb 6.6 oz)  Body mass index is 42.67 kg/m².  Body surface area is 2.75 meters squared.    No intake or output data in the 24 hours ending 08/23/22 2355    Physical Exam  Constitutional:       General: He is not in acute distress.     Appearance: He is ill-appearing.   HENT:      Head: Normocephalic and atraumatic.      Mouth/Throat:      Mouth: Mucous membranes are moist.      Pharynx: Oropharynx is clear.   Eyes:      Extraocular Movements: Extraocular movements intact.      Pupils: Pupils are equal, round, and reactive to light.   Neck:      Comments: Right neck erythema  Cardiovascular:      Rate and Rhythm: Normal rate and regular rhythm.      Pulses: Normal pulses.      Heart sounds: Normal heart sounds.   Pulmonary:      Effort: Pulmonary effort is normal. No respiratory distress.      Breath sounds: Normal breath sounds.   Abdominal:      General: Abdomen is flat. Bowel sounds are normal.      Palpations: Abdomen is soft.   Musculoskeletal:      Cervical back: Neck supple.      Right lower leg: Edema present.      Left lower leg: Edema present.   Skin:     General: Skin is warm and dry.      Capillary Refill: Capillary refill takes less than 2 seconds.      Findings: Erythema (erythema, edema, tenderness, warmth overlying right chest port with two indurated bumps) present.   Neurological:      General: No focal deficit present.      Mental Status: He is alert and oriented  to person, place, and time. Mental status is at baseline.   Psychiatric:         Mood and Affect: Mood normal.         Behavior: Behavior normal.         Thought Content: Thought content normal.         Judgment: Judgment normal.       Significant Labs:   BMP:   Recent Labs   Lab 08/22/22  1240 08/23/22 2044   GLU 96 98    142   K 3.6 4.1   CL 99 99   CO2 34* 35*   BUN 13 13   CREATININE 1.8* 1.8*   CALCIUM 8.9 9.1   , CBC:   Recent Labs   Lab 08/22/22 1240 08/23/22 2044   WBC 6.06 6.84   HGB 7.4* 8.1*   HCT 22.1* 23.3*   * 145*   , and CMP:   Recent Labs   Lab 08/22/22 1240 08/23/22 2044    142   K 3.6 4.1   CL 99 99   CO2 34* 35*   GLU 96 98   BUN 13 13   CREATININE 1.8* 1.8*   CALCIUM 8.9 9.1   PROT  --  6.4   ALBUMIN 2.9* 2.8*   BILITOT  --  0.9   ALKPHOS  --  134   AST  --  33   ALT  --  18   ANIONGAP 8 8       Diagnostic Results:  I have reviewed all pertinent imaging results/findings within the past 24 hours.

## 2022-08-24 NOTE — ASSESSMENT & PLAN NOTE
-- Continue home amiodarone 200 mg daily, metoprolol succinate 25 mg daily, and Eliquis 5 mg BID  -- F/u with his cardiologist (Dr. Sandhu) as scheduled

## 2022-08-24 NOTE — ED NOTES
Pt resting quietly on stretcher. Pt denies pain at this time; no acute distress or discomfort reported or observed. Pt denies restroom needs at this time; is able to reposition self on stretcher. Bed locked in lowest position; side rails up and locked x 2; call light, bedside table, and personal belongings within reach. Plan of care discussed. Pt denies needs at this time.

## 2022-08-24 NOTE — ED NOTES
Pt is AAOx4. Pt is not the best historian regarding specific medical history which his daughter provided at bedside. Pt presents to ED via EMS w/ c/o rash to right shoulder and surrounding port near right shoulder. Pt denies pain or itching. Pt has edema and erythema to right shoulder, chest and neck. Pt reports last radiation on 8/1/22. Pt states some of the redness is due to radiation therapy, however, new redness has developed over patient's port area. Endorses tenderness to palpation. Also endorses progressively worsening edema to bilateral upper and lower extremities. Bed low and locked; side rails up x2.

## 2022-08-24 NOTE — PLAN OF CARE
77 y.o. M with history of HFpEF, chronic hypoxia on 4 L NC, CAD s/p RCA PCI, pAF, CKD 3b, CHARISMA on CPAP, HTN, GERD, depression, anxiety, and squamous cell carcinoma of the right head and neck s/p radical neck dissection and chemo (carboplatin), radiation presenting with port infection. Also with RIJ/R subclavian DVT and possible  RLE cellulitis.       Interval Plan:  - Started Heparin infusion for DVT   - Stopped Plavix for port removal by IR planned for this admission   - Started Vanc and Ceftriaxone

## 2022-08-25 PROBLEM — I47.9 TACHYCARDIA, PAROXYSMAL: Status: ACTIVE | Noted: 2022-01-01

## 2022-08-25 PROBLEM — I82.409 ACUTE DVT (DEEP VENOUS THROMBOSIS): Status: ACTIVE | Noted: 2022-01-01

## 2022-08-25 NOTE — TELEPHONE ENCOUNTER
Spoke to pt to scheduled virtual HFU. PT stated he doesn't know if he'll be out by then but he will take the appt in case he is. Scheduled pt.

## 2022-08-25 NOTE — PROGRESS NOTES
[facility] - Oncology ([facility])  Hematology/Oncology  Progress Note    Patient Name: Janusz Montgomery Jr.  Admission Date: 8/23/2022  Hospital Length of Stay: 1 days  Code Status: Full Code     Subjective:     HPI:  Janusz Montgomery is a 77 y.o. 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 Follows with Dr. Hargrove. Completed chemoradiation therapy on 8/1/22. Reports some redness to right neck which he attributes to radiation. However, has since developed redness, swelling, and tenderness overlying right chest port concerning for possible infection over last 2-3 days. Denies fever or chills. Increasing edema in extremities worse in RUE over last 4-5 days and generalized weakness causing difficulty ambulating. Recently hospitalized with dehydration, NAT, weakness, C dif. Completed vancomycin PO 7 days and diarrhea resolved. Rehydrated and concern that he then became overloaded. Cr uptrending on discharge. Recently saw nephro 8/22 for hospital f/u, noted worsening Cr baseline and increased Lasix to 40 mg daily.      Interval History: stable. No overnight issues. Remains on antibiotics. His APTT was supra therapeutic and heparin was held. We will defer port removal and biopsy til tomorrow to reduce risk of bleeding.     Oncology Treatment Plan:   OP CARBOPLATIN WEEKLY    Medications:  Continuous Infusions:   heparin (porcine) in D5W Stopped (08/25/22 1209)     Scheduled Meds:   acyclovir  200 mg Oral BID    amiodarone  200 mg Oral Daily    atorvastatin  40 mg Oral QHS    cefTRIAXone (ROCEPHIN) IVPB  1 g Intravenous Q24H    furosemide (LASIX) injection  40 mg Intravenous Daily    gabapentin  200 mg Oral Daily    melatonin  9 mg Oral QHS    metoprolol succinate  25 mg Oral Daily    oxybutynin  5  mg Oral QHS    pantoprazole  40 mg Oral Daily    polyethylene glycol  17 g Oral BID    tamsulosin  1 capsule Oral QHS    vancomycin  125 mg Oral Q12H    venlafaxine  75 mg Oral QHS     PRN Meds:acetaminophen, aluminum-magnesium hydroxide-simethicone, artificial tears, dextrose 10%, dextrose 10%, glucagon (human recombinant), glucose, glucose, heparin (PORCINE), heparin (PORCINE), HYDROcodone-acetaminophen, naloxone, ondansetron, promethazine, senna-docusate 8.6-50 mg, simethicone, sodium chloride 0.9%, Pharmacy to dose Vancomycin consult **AND** vancomycin - pharmacy to dose       Objective:     Vital Signs (Most Recent):  Temp: 97.7 °F (36.5 °C) (08/25/22 1536)  Pulse: 69 (08/25/22 1536)  Resp: 18 (08/25/22 1536)  BP: (!) 128/58 (08/25/22 1536)  SpO2: 99 % (08/25/22 1536)   Vital Signs (24h Range):  Temp:  [97.7 °F (36.5 °C)-98.3 °F (36.8 °C)] 97.7 °F (36.5 °C)  Pulse:  [67-76] 69  Resp:  [16-18] 18  SpO2:  [96 %-99 %] 99 %  BP: (107-142)/(51-59) 128/58     Weight: (!) 143.8 kg (317 lb 0.3 oz)  Body mass index is 41.83 kg/m².  Body surface area is 2.72 meters squared.      Intake/Output Summary (Last 24 hours) at 8/25/2022 1652  Last data filed at 8/25/2022 1209  Gross per 24 hour   Intake 250 ml   Output 2165 ml   Net -1915 ml       Physical Exam  Constitutional:       General: He is not in acute distress.     Appearance: Normal appearance. He is not toxic-appearing.   HENT:      Head: Normocephalic and atraumatic.      Nose: Nose normal.      Mouth/Throat:      Mouth: Mucous membranes are moist.      Pharynx: Oropharynx is clear.   Eyes:      General: No scleral icterus.     Conjunctiva/sclera: Conjunctivae normal.   Neck:      Comments: Right sided port with nodule erythematous lesions and warmth/tenderness on palpation   Cardiovascular:      Rate and Rhythm: Normal rate.   Pulmonary:      Effort: Pulmonary effort is normal. No respiratory distress.      Breath sounds: No wheezing.   Abdominal:       General: There is no distension.      Palpations: Abdomen is soft.      Tenderness: There is no abdominal tenderness.   Musculoskeletal:         General: No tenderness.      Cervical back: Normal range of motion.      Right lower leg: Edema present.      Left lower leg: Edema present.   Skin:     General: Skin is warm and dry.      Findings: No rash.   Neurological:      Mental Status: He is alert and oriented to person, place, and time.      Motor: No weakness.   Psychiatric:         Mood and Affect: Mood normal.         Behavior: Behavior normal.         Thought Content: Thought content normal.       Significant Labs:   All pertinent labs from the last 24 hours have been reviewed.    Diagnostic Results:  I have reviewed all pertinent imaging results/findings within the past 24 hours.    Assessment/Plan:     * Infection of venous access port  -- Suspect likely infection of right chest venous access port  -- No evidence of sepsis  -- Hemodynamically stable  -- Slightly elevated inflammatory markers  -- Two small indurated bumps concerning for possible abscesses    PLAN:  -- Continue vancomycin and ceftriaxone   -- F/u BCx   -- ID on board, plan for port removal     Acute DVT (deep venous thrombosis)  On heparin drip  Adjust based on APTT levels     Acute decompensated heart failure  -- Worsening edema, seems to have progressed more since fluid resuscitation on recent hospitalization.   -- Requiring recent increases in Lasix 20 mg from PRN to daily to 40 mg daily  -- BNP upper level normal. Although likely falsely low in setting of obesity  -- Volume overloaded on exam  -- Lasix 40 mg IV daily. Adjust as clinically indicated  -- Low sodium diet, which he has some non-adherence to per recent cardiology visit  -- Follows closely with cardiology. Scheduled to see 8/29      Chronic respiratory failure with hypoxia  Patient with Hypoxic Respiratory failure which is Chronic.  he is on home oxygen at 4 LPM. Supplemental  oxygen was provided and noted-  .   Signs/symptoms of respiratory failure include- lethargy. Contributing diagnoses includes - CHF, restrictive lung disease. Labs and images were reviewed. Patient Has not had a recent ABG. Will treat underlying causes and adjust management of respiratory failure as follows- Continue home 4L O2    Head and neck cancer  -- Squamous cell carcinoma of the right head and neck s/p radical neck dissection and chemoradiation with carboplatin, completed 8/1/22. Follows with Dr. Hargrove.     Paroxysmal atrial fibrillation   -- Continue home amiodarone 200 mg daily, metoprolol succinate 25 mg daily, and Eliquis 5 mg BID  -- F/u with his cardiologist (Dr. Sandhu) as scheduled             Art Carvalho MD  Hematology/Oncology  [facility] - Oncology ([facility])

## 2022-08-25 NOTE — ASSESSMENT & PLAN NOTE
-- Suspect likely infection of right chest venous access port  -- No evidence of sepsis  -- Hemodynamically stable  -- Slightly elevated inflammatory markers  -- Two small indurated bumps concerning for possible abscesses    PLAN:  -- Continue vancomycin and ceftriaxone   -- F/u BCx   -- ID on board, plan for port removal

## 2022-08-25 NOTE — PROGRESS NOTES
[facility] Infectious Diseases Progress Note    Assessment/Plan:     Janusz Montgomery 77M hx squamous cell H/N cancer s/p radical neck dissection and chemoradiation who presents w/ possible port infection. R chest port w/ overlying erythema and new nodular skin lesions present. Nodular lesions are atypical, and concerning for malignancy vs. Atypical infection (nocardia, fungal, mycobacterial). Blood cx NGTD. Chest US w/ fluid collection surrounding port. Pt on vanc and ceftriaxone.     Infection of venous access port  - right chest US ordered, will follow-up  - recommend port removal, send cultures from swab of pocket at time of removal - gram stain, aerobic, anaerobic, fungal, AFB, and modified AFB  - would also obtain tissue biopsy of nodular lesion to send for path given atypical appearance - r/o malignancy, send for GMS and AFB stains  - IR consulted -- plans for removal on 8/26 AM    - 8/24 BCx NG so far  - continue vanc and ceftriaxone for now  - no diarrhea - would consider restarting PO vancomycin 125mg BID while on systemic antibiotics to prevent C. Diff recurrence     Louie Fuentes MD  [facility] Infectious Diseases  Cell: [phone]    Thank you for allowing us to participate in the care of this patient. We will continue to follow along. Case has been discussed with consult staff, who is in agreement with assessment and plan. ID will continue to follow.    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 Completed chemoradiation therapy on 8/1/22. Pt endorses some erythema to R side of neck that he attributed to radiation treatments, however states this progressed to involve the area overlying his port, with sudden development of tender nodular lesion on chest. He denies any fevers at home. Also endorses  increased redness of R>L lower extremities w/ increased swelling and tenderness. Of note, recently completed a 7 day course of PO vancomycin for c. Diff infection, not currently complaining of diarrhea. Blood cultures obtained, and patient has been started on vanc and ceftriaxone for possible port infection. US of chest shows fluid collection surrounding port. ID consulted for assistance.     Interval events: Some more discomfort at port area, denies f/c/ns/nv     Objective:   Last 24 Hour Vital Signs:  BP  Min: 105/49  Max: 142/59  Temp  Av.9 °F (36.6 °C)  Min: 97.4 °F (36.3 °C)  Max: 98.3 °F (36.8 °C)  Pulse  Av.4  Min: 67  Max: 84  Resp  Av.1  Min: 18  Max: 19  SpO2  Av.4 %  Min: 96 %  Max: 97 %  I/O last 3 completed shifts:  In: 976.2 [P.O.:930; IV Piggyback:46.2]  Out: 1375 [Urine:1375]    Physical Exam  Constitutional:       General: He is not in acute distress.     Appearance: Normal appearance. He is well-developed. He is not ill-appearing or diaphoretic.   HENT:      Head: Normocephalic and atraumatic.      Right Ear: External ear normal.      Left Ear: External ear normal.      Nose: Nose normal.   Eyes:      General: No scleral icterus.        Right eye: No discharge.         Left eye: No discharge.      Extraocular Movements: Extraocular movements intact.      Conjunctiva/sclera: Conjunctivae normal.   Neck:      Comments: R neck erythema   Pulmonary:      Effort: Pulmonary effort is normal. No respiratory distress.      Breath sounds: No stridor.   Musculoskeletal:      Right lower leg: Edema present.      Left lower leg: Edema present.      Comments: B/l LE erythema R>L, warmth and tenderness   Skin:     General: Skin is dry.      Coloration: Skin is not jaundiced or pale.      Findings: Erythema and lesion present.      Comments: Erythema of R chest overlying port  No fluctuance  Atypical appearing nodular skin lesion present   Neurological:      General: No focal deficit present.  "     Mental Status: He is alert and oriented to person, place, and time. Mental status is at baseline.   Psychiatric:         Mood and Affect: Mood normal.         Behavior: Behavior normal.         Thought Content: Thought content normal.         Judgment: Judgment normal.     Laboratory:  Laboratory Data Reviewed: yes  Pertinent Findings:  Recent Labs   Lab 08/23/22 2044 08/24/22  0303 08/25/22  0406   WBC 6.84 6.72 5.97   HGB 8.1* 7.6* 7.2*   HCT 23.3* 22.7* 21.4*   * 148* 142*   * 108* 104*   RDW 17.6* 17.5* 17.3*    141 141   K 4.1 3.8 3.3*   CL 99 98 96   CO2 35* 36* 37*   BUN 13 14 16   CREATININE 1.8* 1.9* 1.8*   GLU 98 102 98   PROT 6.4 6.0 6.0   ALBUMIN 2.8* 2.8* 2.7*   BILITOT 0.9 1.0 1.0   AST 33 18 14   ALKPHOS 134 139* 124   ALT 18 16 15         Microbiology Data:  Microbiology Results (last 7 days)     Procedure Component Value Units Date/Time    Blood culture [[id number]] Collected: 08/24/22 0113    Order Status: Completed Specimen: Blood from Peripheral, Lower Arm, Right Updated: 08/25/22 0613     Blood Culture, Routine No Growth to date      No Growth to date    Blood culture [[id number]] Collected: 08/24/22 0112    Order Status: Completed Specimen: Blood from Peripheral, Hand, Right Updated: 08/25/22 0613     Blood Culture, Routine No Growth to date      No Growth to date            Antimicrobials:  Antibiotics (From admission, onward)            Start     Stop Route Frequency Ordered    08/24/22 1215  cefTRIAXone (ROCEPHIN) 1 g/50 mL D5W IVPB         -- IV Every 24 hours (non-standard times) 08/24/22 1113    08/24/22 0151  vancomycin - pharmacy to dose  (vancomycin IVPB)        "And" Linked Group Details    -- IV pharmacy to manage frequency 08/24/22 0051        Antifungals (From admission, onward)            None        Antivirals (From admission, onward)        Stop Route Frequency     acyclovir         -- Oral 2 times daily            Other Results:  Radiology " Results:  X-Ray Chest PA And Lateral    Result Date: 7/29/2022  EXAMINATION: XR CHEST PA AND LATERAL CLINICAL HISTORY: shortness of breath; Shortness of breath TECHNIQUE: PA and lateral views of the chest were performed. COMPARISON: Chest radiograph 06/10/2022 FINDINGS: Interval placement of an RIJV-approach infusion port catheter with terminus projecting over the expected location of the caval atrial junction. Cardiomediastinal silhouette is within normal limits. Thin persistent trace loculated pleural effusion extends along the posterior aspect of the left hemithorax, unchanged.  No appreciable new focal consolidation, overt interstitial edema, sizable pleural effusion or pneumothorax.  Bilateral calcified pleural plaques, unchanged. Multilevel degenerative changes of the imaged spine.     1. No acute cardiopulmonary process appreciated. Electronically signed by: Álvaro Comer Date:    07/29/2022 Time:    12:45    US Chest Mediastinum    Result Date: 8/24/2022  EXAMINATION: US CHEST MEDIASTINUM CLINICAL HISTORY: swelling overlying port in R chest wall;. COMPARISON: Chest radiograph 08/23/2022.  CT chest 02/22/2022. TECHNIQUE: Limited ultrasound evaluation of right chest wall FINDINGS: Heterogeneous collection about the right chest port measures approximately 3.8 x 1.5 x 2.8 cm.     1. Heterogeneous collection about the right chest port, could represent hematoma. This report was flagged in Epic as abnormal. Electronically signed by resident: Stacey Wall Date:    08/24/2022 Time:    00:42 Electronically signed by: Abdirizak Valadez MD Date:    08/24/2022 Time:    01:03    X-Ray Chest AP Portable    Result Date: 8/23/2022  EXAMINATION: XR CHEST AP PORTABLE CLINICAL HISTORY: shortness of breath, edema; TECHNIQUE: Single frontal view of the chest was performed. COMPARISON: 08/10/2022. FINDINGS: Lordotic positioning.  Right-sided port catheter tip overlies the right atrium.  Loop recorder device overlies the left  hemithorax. Underinflated lungs with hypoventilatory change.  Subsegmental atelectasis at the lung bases.  Calcified pleural plaques, similar to prior. Heart and lungs otherwise appear unchanged when allowing for differences in technique and positioning.     Lordotic positioning. Right-sided port catheter tip overlies the right atrium. Loop recorder device overlies the left hemithorax. Underinflated lungs with hypoventilatory change.  Subsegmental atelectasis at the lung bases. Calcified pleural plaques, similar to prior. Electronically signed by: Abdirizak Valadez MD Date:    08/23/2022 Time:    21:36    X-Ray Chest AP Portable    Result Date: 8/10/2022  EXAMINATION: XR CHEST AP PORTABLE CLINICAL HISTORY: Sepsis; TECHNIQUE: Single frontal view of the chest was performed. COMPARISON: 07/29/2022. FINDINGS: Cardiac silhouette is normal in size.  Right-sided chest port is visualized in stable position.  Loop recorder device is noted.  Lungs are hypoinflated.  Calcified pleural plaques are seen.  No evidence of new focal consolidative process, pneumothorax, or significant pleural effusion.  No acute osseous abnormality identified.     No acute cardiopulmonary process identified. Electronically signed by: Boom Wilson MD Date:    08/10/2022 Time:    23:48    US Upper Extremity Veins Right    Result Date: 8/24/2022  EXAMINATION: US UPPER EXTREMITY VEINS RIGHT CLINICAL HISTORY: rule out DVT; TECHNIQUE: Duplex and color flow Doppler evaluation and dynamic compression was performed of the right upper extremity veins. COMPARISON: CT soft tissue neck 04/12/2022. FINDINGS: Central veins: Thrombosis in the right internal jugular vein.  Partially occlusive thrombus in the right subclavian vein.  The axillary vein is patent and free of thrombus. Arm veins: The brachial, basilic, and cephalic veins are patent and compressible. Contralateral subclavian/internal jugular veins: The left subclavian and internal jugular veins are patent  and free of thrombus. Other findings: None.     Deep vein thrombosis in the right internal jugular vein and right subclavian vein. This report was flagged in Epic as abnormal. COMMUNICATION This critical result was discovered/received at 05:30.  The critical information above was relayed directly by me by telephone to Jamila Kennedy DO on 08/24/2022 at 05:30. Electronically signed by resident: Stacey Wall Date:    08/24/2022 Time:    05:31 Electronically signed by: Isidro Uribe Date:    08/24/2022 Time:    06:05      Current Medications:     Infusions:   heparin (porcine) in D5W 14 Units/kg/hr (08/25/22 0712)        Scheduled:   acyclovir  200 mg Oral BID    amiodarone  200 mg Oral Daily    atorvastatin  40 mg Oral QHS    cefTRIAXone (ROCEPHIN) IVPB  1 g Intravenous Q24H    furosemide (LASIX) injection  40 mg Intravenous Daily    gabapentin  200 mg Oral Daily    melatonin  9 mg Oral QHS    metoprolol succinate  25 mg Oral Daily    oxybutynin  5 mg Oral QHS    pantoprazole  40 mg Oral Daily    tamsulosin  1 capsule Oral QHS    venlafaxine  75 mg Oral QHS        PRN:  acetaminophen, aluminum-magnesium hydroxide-simethicone, artificial tears, dextrose 10%, dextrose 10%, glucagon (human recombinant), glucose, glucose, heparin (PORCINE), heparin (PORCINE), HYDROcodone-acetaminophen, naloxone, ondansetron, promethazine, senna-docusate 8.6-50 mg, simethicone, sodium chloride 0.9%, Pharmacy to dose Vancomycin consult **AND** vancomycin - pharmacy to dose

## 2022-08-25 NOTE — ASSESSMENT & PLAN NOTE
-- Worsening edema, seems to have progressed more since fluid resuscitation on recent hospitalization.   -- Requiring recent increases in Lasix 20 mg from PRN to daily to 40 mg daily  -- BNP upper level normal. Although likely falsely low in setting of obesity  -- Volume overloaded on exam  -- Lasix 40 mg IV daily. Adjust as clinically indicated  -- Low sodium diet, which he has some non-adherence to per recent cardiology visit  -- Follows closely with cardiology. Scheduled to see 8/29

## 2022-08-25 NOTE — TELEPHONE ENCOUNTER
----- Message from Miguelina Weathesr MD sent at 8/23/2022  1:43 PM CDT -----  Can you schedule him for a virtual hospital follow up visit for Friday pm?

## 2022-08-25 NOTE — PLAN OF CARE
Problem: Adult Inpatient Plan of Care  Goal: Plan of Care Review  Outcome: Ongoing, Not Progressing  Goal: Patient-Specific Goal (Individualized)  Outcome: Ongoing, Not Progressing  Goal: Absence of Hospital-Acquired Illness or Injury  Outcome: Ongoing, Not Progressing  Goal: Optimal Comfort and Wellbeing  Outcome: Ongoing, Not Progressing  Goal: Readiness for Transition of Care  Outcome: Ongoing, Not Progressing     Problem: Bariatric Environmental Safety  Goal: Safety Maintained with Care  Outcome: Ongoing, Not Progressing     Problem: Infection  Goal: Absence of Infection Signs and Symptoms  Outcome: Ongoing, Not Progressing     Problem: Impaired Wound Healing  Goal: Optimal Wound Healing  Outcome: Ongoing, Not Progressing     Problem: Adjustment to Illness (Sepsis/Septic Shock)  Goal: Optimal Coping  Outcome: Ongoing, Not Progressing     Problem: Bleeding (Sepsis/Septic Shock)  Goal: Absence of Bleeding  Outcome: Ongoing, Not Progressing     Problem: Glycemic Control Impaired (Sepsis/Septic Shock)  Goal: Blood Glucose Level Within Desired Range  Outcome: Ongoing, Not Progressing     Problem: Infection Progression (Sepsis/Septic Shock)  Goal: Absence of Infection Signs and Symptoms  Outcome: Ongoing, Not Progressing     Problem: Nutrition Impaired (Sepsis/Septic Shock)  Goal: Optimal Nutrition Intake  Outcome: Ongoing, Not Progressing     Problem: Fluid and Electrolyte Imbalance (Acute Kidney Injury/Impairment)  Goal: Fluid and Electrolyte Balance  Outcome: Ongoing, Not Progressing     Problem: Oral Intake Inadequate (Acute Kidney Injury/Impairment)  Goal: Optimal Nutrition Intake  Outcome: Ongoing, Not Progressing     Problem: Renal Function Impairment (Acute Kidney Injury/Impairment)  Goal: Effective Renal Function  Outcome: Ongoing, Not Progressing

## 2022-08-25 NOTE — NURSING
Patient A/Ox 4 during shift. Remains on 4l NC which is home amount. Pt. Remains on heparin gtt. Issue with lab collecting specimen at correct time therefore adjustment was delayed. Heparin gtt on hold per protocol until 0700. No signs or symptoms of bleeding. Pt. Needed second line placed due to having vanco and it not being compatible with heparin gtt. Numerous attempts to place second line with no success. Contacted MD and order placed for midline. ICU nurse was able to come and obtain second line and vanco was started but delayed.

## 2022-08-25 NOTE — PLAN OF CARE
ID and IR consulted, plan is to d/c PO anticoagulant therapy - team started subQ lovenox then decided to d/c and start a IV heparin drip. APTT ordered for 0105. Pt did not have a BM during shift - notified RN that pt would like PRN sennaclt. Artificial tears ordered for pt per request of pt. Pt rounded on hourly and educated to turn in bed to prevent bed sores. Pt to have port removed once DVT under control per request of IR.

## 2022-08-25 NOTE — SUBJECTIVE & OBJECTIVE
Interval History: stable. No overnight issues. Remains on antibiotics. His APTT was supra therapeutic and heparin was held. We will defer port removal and biopsy til tomorrow to reduce risk of bleeding.     Oncology Treatment Plan:   OP CARBOPLATIN WEEKLY    Medications:  Continuous Infusions:   heparin (porcine) in D5W Stopped (08/25/22 1209)     Scheduled Meds:   acyclovir  200 mg Oral BID    amiodarone  200 mg Oral Daily    atorvastatin  40 mg Oral QHS    cefTRIAXone (ROCEPHIN) IVPB  1 g Intravenous Q24H    furosemide (LASIX) injection  40 mg Intravenous Daily    gabapentin  200 mg Oral Daily    melatonin  9 mg Oral QHS    metoprolol succinate  25 mg Oral Daily    oxybutynin  5 mg Oral QHS    pantoprazole  40 mg Oral Daily    polyethylene glycol  17 g Oral BID    tamsulosin  1 capsule Oral QHS    vancomycin  125 mg Oral Q12H    venlafaxine  75 mg Oral QHS     PRN Meds:acetaminophen, aluminum-magnesium hydroxide-simethicone, artificial tears, dextrose 10%, dextrose 10%, glucagon (human recombinant), glucose, glucose, heparin (PORCINE), heparin (PORCINE), HYDROcodone-acetaminophen, naloxone, ondansetron, promethazine, senna-docusate 8.6-50 mg, simethicone, sodium chloride 0.9%, Pharmacy to dose Vancomycin consult **AND** vancomycin - pharmacy to dose       Objective:     Vital Signs (Most Recent):  Temp: 97.7 °F (36.5 °C) (08/25/22 1536)  Pulse: 69 (08/25/22 1536)  Resp: 18 (08/25/22 1536)  BP: (!) 128/58 (08/25/22 1536)  SpO2: 99 % (08/25/22 1536)   Vital Signs (24h Range):  Temp:  [97.7 °F (36.5 °C)-98.3 °F (36.8 °C)] 97.7 °F (36.5 °C)  Pulse:  [67-76] 69  Resp:  [16-18] 18  SpO2:  [96 %-99 %] 99 %  BP: (107-142)/(51-59) 128/58     Weight: (!) 143.8 kg (317 lb 0.3 oz)  Body mass index is 41.83 kg/m².  Body surface area is 2.72 meters squared.      Intake/Output Summary (Last 24 hours) at 8/25/2022 1652  Last data filed at 8/25/2022 1209  Gross per 24 hour   Intake 250 ml   Output 2165 ml   Net -1915 ml        Physical Exam  Constitutional:       General: He is not in acute distress.     Appearance: Normal appearance. He is not toxic-appearing.   HENT:      Head: Normocephalic and atraumatic.      Nose: Nose normal.      Mouth/Throat:      Mouth: Mucous membranes are moist.      Pharynx: Oropharynx is clear.   Eyes:      General: No scleral icterus.     Conjunctiva/sclera: Conjunctivae normal.   Neck:      Comments: Right sided port with nodule erythematous lesions and warmth/tenderness on palpation   Cardiovascular:      Rate and Rhythm: Normal rate.   Pulmonary:      Effort: Pulmonary effort is normal. No respiratory distress.      Breath sounds: No wheezing.   Abdominal:      General: There is no distension.      Palpations: Abdomen is soft.      Tenderness: There is no abdominal tenderness.   Musculoskeletal:         General: No tenderness.      Cervical back: Normal range of motion.      Right lower leg: Edema present.      Left lower leg: Edema present.   Skin:     General: Skin is warm and dry.      Findings: No rash.   Neurological:      Mental Status: He is alert and oriented to person, place, and time.      Motor: No weakness.   Psychiatric:         Mood and Affect: Mood normal.         Behavior: Behavior normal.         Thought Content: Thought content normal.       Significant Labs:   All pertinent labs from the last 24 hours have been reviewed.    Diagnostic Results:  I have reviewed all pertinent imaging results/findings within the past 24 hours.

## 2022-08-25 NOTE — PROGRESS NOTES
Pharmacokinetic Assessment Follow Up: IV Vancomycin    Vancomycin serum concentration assessment(s):    The random level was drawn correctly and can be used to guide therapy at this time. The measurement is within the desired definitive target range of 10 to 20 mcg/mL.   - The random level was drawn ~23 hours after previous dose and resulted at 13.7.    Vancomycin Regimen Plan:    Will re-dose once now with vanc 1250 mg given level <20.  - Re-dose when the random level is less than 20 mcg/mL, next level to be drawn with AM labs on 8/26.  - Mr. Montgomery has AIN and CKD3 at baseline. Per outpatient nephro notes= on 8/22, baseline  Scr 1.5-1.6 since 2020. No RRT at this time.  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    Drug levels (last 3 results):  Recent Labs   Lab Result Units 08/24/22  2217   Vancomycin, Random ug/mL 13.7       Pharmacy will continue to follow and monitor vancomycin.    Please contact pharmacy at extension [phone] for questions regarding this assessment.    Thank you for the consult,   Dorina Ramirez       Patient brief summary:  Janusz Montgomery Jr. is a 77 y.o. male initiated on antimicrobial therapy with IV Vancomycin for treatment of infection of right chest venous access port    The patient's current regimen is pulse dosing in the setting of NAT.    Actual Body Weight:   143.8 kg    Renal Function:   Estimated Creatinine Clearance: 50.1 mL/min (A) (based on SCr of 1.9 mg/dL (H)).     Dialysis Method (if applicable):  N/A

## 2022-08-25 NOTE — PROGRESS NOTES
Received call from daughter, Radha, in regards to facilities that they would like referrals to for SNF placement. She listed [facility], [facility] and [facility]. Awaiting PT and OT evals for recommendations of level of care in order to start referrals.

## 2022-08-26 NOTE — ASSESSMENT & PLAN NOTE
-- Continue home amiodarone 200 mg daily, metoprolol succinate 25 mg daily  -- F/u with his cardiologist (Dr. Sandhu) as scheduled  -- Eliquis 5mg transitioned  To  Heparin gtt for DVT on Eliquis

## 2022-08-26 NOTE — PT/OT/SLP EVAL
"Occupational Therapy   Co-Evaluation and Tx  CoEval and Tx performed to optimize pt participation and assessment of full functional capacity.     Name: Janusz Montgomery Jr.  MRN: [id number]  Admitting Diagnosis:  Infection of venous access port  Recent Surgery: * No surgery found *      Recommendations:     Discharge Recommendations: nursing facility, skilled  Discharge Equipment Recommendations:  none  Barriers to discharge:  Decreased caregiver support    Assessment:     Janusz Montgomery Jr. is a 77 y.o. male with a medical diagnosis of Infection of venous access port.  He presents agreeable to therapy upon entry to room. Pt w fair tolerance to session w / observed SOB, decreased endurance and decreased functional mobility 2/2 BLE weakness. Pt with c/o dizziness when standing w/ slight buckling in knees and arms. Performance deficits affecting function: weakness, impaired endurance, impaired self care skills, impaired functional mobility, gait instability, impaired balance, decreased upper extremity function, decreased lower extremity function, decreased ROM, impaired cardiopulmonary response to activity.      Rehab Prognosis: Fair; patient would benefit from acute skilled OT services to address these deficits and reach maximum level of function.       Plan:     Patient to be seen 3 x/week to address the above listed problems via self-care/home management, therapeutic activities, therapeutic exercises  · Plan of Care Expires: 09/26/22  · Plan of Care Reviewed with: patient, family    Subjective     Chief Complaint: Weakness   Patient/Family Comments/goals: "I just start shaking sometimes when Im standing"    Occupational Profile:  Living Environment: pt lives wife in H, no JONATHAN and TS combo in restroom.  Previous level of function: mod ind w/ ADLs  Roles and Routines: father,   Equipment Used at Home:  bedside commode, bath bench, CPAP, oxygen, cane, straight, walker, rolling  Assistance upon Discharge: wife " will be at home but has decreased ability to assist.    Pain/Comfort:  · Pain Rating 1: 0/10    Patients cultural, spiritual, Church conflicts given the current situation: no    Objective:     Communicated with: RN prior to session.  Patient found supine with peripheral IV upon OT entry to room.    General Precautions: Standard, fall   Orthopedic Precautions:N/A   Braces: N/A  Respiratory Status: Nasal cannula, flow 4 L/min    Occupational Performance:    Bed Mobility:    · Patient completed Rolling/Turning to Left with  stand by assistance  · Patient completed Supine to Sit with stand by assistance  · Patient completed Sit to Supine with stand by assistance    Functional Mobility/Transfers:  · Patient completed Sit <> Stand Transfer with contact guard assistance  with  rolling walker   · Functional Mobility: Not completed this session 2/2 pt w decreased BLE weakness and c/o dizziness.     Activities of Daily Living:  · Grooming: supervision washed face seated EOB     Cognitive/Visual Perceptual:  Cognitive/Psychosocial Skills:     -       Oriented to: Person, Place, Time and Situation   -       Follows Commands/attention:Follows multistep  commands  -       Communication: clear/fluent  -       Memory: No Deficits noted  -       Safety awareness/insight to disability: intact   -       Mood/Affect/Coping skills/emotional control: Cooperative and Pleasant    Physical Exam:  Upper Extremity Range of Motion:     -       Right Upper Extremity: WFL  -       Left Upper Extremity: Deficits: decreased ROM 2/2 hx of frozen shoulder  Upper Extremity Strength:    -       Right Upper Extremity: WFL  -       Left Upper Extremity: WFL   Strength:    -       Right Upper Extremity: WFL  -       Left Upper Extremity: WFL    AMPAC 6 Click ADL:  AMPAC Total Score: 16    Treatment & Education:  Pt educated on scope of practice and importance of daily functional mobility.   Pt educated on safety precautions during transfer  Pt  educated on breathing techniques  White board updated to reflect pt status and visual reminder included on board instructing her to call for nurse as needed.  Education:    Patient left supine with all lines intact, call button in reach, RN notified and family present    GOALS:   Multidisciplinary Problems     Occupational Therapy Goals        Problem: Occupational Therapy    Goal Priority Disciplines Outcome Interventions   Occupational Therapy Goal     OT, PT/OT Ongoing, Progressing    Description: Goals to be met by: 9/9/2022    Patient will increase functional independence with ADLs by performing:    UE Dressing with Stand-by Assistance.  LE Dressing with Contact Guard Assistance.  Grooming while standing with Supervision.  Toileting from bedside commode with Stand-by Assistance for hygiene and clothing management.   Toilet transfer to bedside commode with Stand-by Assistance using LRAD.                     History:     Past Medical History:   Diagnosis Date    (HFpEF) heart failure with preserved ejection fraction 02/04/2022    NAT (acute kidney injury) 03/19/2017    ALLERGIC RHINITIS     Anemia     Anxiety     Basal cell carcinoma 10/19/2018    forehead and right medial shoulder    Basal cell carcinoma 01/09/2019    left nasal bridge and left posterior ear    Basal cell carcinoma 06/12/2020    left lower medial leg - Efudex    Basal cell carcinoma 11/30/2020    left medial upper eyelid canthus superficial BCC    Chronic rhinitis 05/03/2013    Chronic rhinitis 05/03/2013    Coronary artery disease involving native coronary artery of native heart without angina pectoris s/p RCA stent     Cortical cataract of both eyes 03/18/2016    Delayed sleep phase syndrome 03/13/2019    Depression     Erectile dysfunction 03/24/2014    Erectile dysfunction 03/24/2014    Essential hypertension     GERD (gastroesophageal reflux disease) 07/25/2012    Gout, arthritis     Grade III open fracture of left  "tibia and fibula s/p ex-fix on 7/1/16 and ORIF of left tibia on 7/15 07/06/2016    H/O: iritis     Helicobacter pylori (H. pylori) infection     Treated    Hepatitis     Herpes simplex keratoconjunctivitis 09/30/2015    - on acyclovir - followed by opthalmology, Dr. Uribe     Herpes simplex keratoconjunctivitis 09/30/2015    - on acyclovir - followed by opthalmology, Dr. Uribe     Hyperkalemia 02/28/2017    Hyperlipidemia     Hypogonadism male     Hypogonadism male     Immunodeficiency due to drug therapy 07/08/2022    Keloid cicatrix     Lymphoma     HEAD & NECK    Mixed anxiety and depressive disorder     Morbid obesity     Obstructive sleep apnea on CPAP     CPAP    On home oxygen therapy     Osteoarthritis of left knee 07/25/2012    Paroxysmal atrial fibrillation 07/06/2016    Primary osteoarthritis of left knee 07/25/2012    Prominent aorta 01/25/2016    "RESULTS: THE HEART IS MILDLY ENLARGED WITH A SLIGHTLY PROMINENT AORTA" - Xray Chest PA & Lateral 12-     Prostate cancer 02/15/2016    - followed by urology, Dr. Young     Prostate cancer 02/15/2016    - followed by urology, Dr. 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    chest and forehead    Squamous cell cancer of skin of nose     Traumatic type III open fracture of shaft of left tibia and fibula with nonunion 07/06/2016    Type III open fracture of left tibia and fibula with routine healing 07/06/2016    Vitamin D deficiency disease     Vitreous detachment 03/18/2016       Past Surgical History:   Procedure Laterality Date    ADJACENT TISSUE TRANSFER Left 11/30/2020    Procedure: ADJACENT TISSUE TRANSFER x2 ;  " "Surgeon: Teresa Cooper MD;  Location: [facility] 1ST FLR;  Service: Ophthalmology;  Laterality: Left;  Left glabellar 8.5x11x3 and Left upper eyelid advancement flap [id number]      Cardiac stenting x2      CATARACT EXTRACTION W/  INTRAOCULAR LENS IMPLANT Right 3/29/2016    Dr. Conteh    CATARACT EXTRACTION W/  INTRAOCULAR LENS IMPLANT Left 4/12/2016        COLONOSCOPY N/A 7/23/2020    Procedure: COLONOSCOPY;  Surgeon: Nico Lackey MD;  Location: [facility] (2ND FLR);  Service: Endoscopy;  Laterality: N/A;    DIRECT LARYNGOSCOPY N/A 5/4/2022    Procedure: LARYNGOSCOPY, DIRECT possible biopsy;  Surgeon: Etta Higgins MD;  Location: [facility] OR;  Service: ENT;  Laterality: N/A;  RN PREOP ON 4/29/22. NEEDS T&S DAY OF SURGERY--NF  CONSENTS DAY OF SURGERY    DISSECTION OF NECK Right 5/4/2022    Procedure: RADICAL NECK DISSECTION;  Surgeon: Etta Higgins MD;  Location: [facility] OR;  Service: ENT;  Laterality: Right;  TO FOLLOW DR ALMAZAN    ESOPHAGOGASTRODUODENOSCOPY N/A 7/23/2020    Procedure: EGD (ESOPHAGOGASTRODUODENOSCOPY);  Surgeon: Nico Lackey MD;  Location: [facility] (2ND FLR);  Service: Endoscopy;  Laterality: N/A;  per Dr Lackey-Will proceed with EGD and colonoscopy on the 2nd floor due to his comorbidities including obesity, sleep apnea, restrictive lung disease, coronary artery disease.  has loop recorder      ok to hold Eliquis 2 days per Dr Sandhu-must remain    EXTERNAL FIXATION TIBIAL FRACTURE Left 07/01/2016    INSERTION OF IMPLANTABLE LOOP RECORDER  04/07/2017    LEFT HEART CATHETERIZATION Left 1/30/2020    Procedure: Left heart cath;  Surgeon: Benjamin Sandhu MD;  Location: [facility] CATH LAB;  Service: Cardiology;  Laterality: Left;  RN PREOP 1/28/20--Pt starting Plavix loading dose today (8pills)- Dr. Sandhu aware.  Pt has a bandaged "non healing area to LLE"--Dr. Sandhu aware.    LEFT HEART CATHETERIZATION Left 2/14/2020    Procedure: Left heart cath, IVUS guided left main " / LAD PCI. Noon start, radial approach;  Surgeon: Miguel Angel Brady MD;  Location: [facility] CATH LAB;  Service: Cardiology;  Laterality: Left;  RN PRE OP 2-7-2020  BMI--45.61    ORIF TIBIA FRACTURE Left 07/15/2016    PROBING OF NASOLACRIMAL DUCT WITH INSERTION OF TUBE Left 11/30/2020    Procedure: PROBING, NASOLACRIMAL DUCT,;  Surgeon: Teresa Cooper MD;  Location: [facility] OR [address] [place] [address];  Service: Ophthalmology;  Laterality: Left;    RADIOACTIVE SEED IMPLANTATION OF PROSTATE N/A 8/8/2018    Procedure: INSERTION, RADIOACTIVE SEED, PROSTATE;  Surgeon: Bipin Thompson MD;  Location: [facility] OR [address] [place] [address];  Service: Urology;  Laterality: N/A;  1 hour    SKIN BIOPSY      Squamous cell cancer removal x3 with Mohs surgery      TONSILLECTOMY      TOTAL KNEE ARTHROPLASTY  10/2012    TRIGGER FINGER RELEASE Right 10/14/2020    Procedure: RELEASE, TRIGGER FINGER - right;  Surgeon: Rad Swift MD;  Location: [facility];  Service: Orthopedics;  Laterality: Right;    trus/bx      ULTRASOUND GUIDANCE  2/14/2020    Procedure: Ultrasound Guidance;  Surgeon: Miguel Angel Brady MD;  Location: [facility] CATH LAB;  Service: Cardiology;;       Time Tracking:     OT Date of Treatment: 08/26/22  OT Start Time: 0951  OT Stop Time: 1026  OT Total Time (min): 35 min    Billable Minutes:Evaluation 10  Self Care/Home Management 10  Therapeutic Activity 15    8/26/2022

## 2022-08-26 NOTE — PLAN OF CARE
Problem: Occupational Therapy  Goal: Occupational Therapy Goal  Description: Goals to be met by: 9/9/2022    Patient will increase functional independence with ADLs by performing:    UE Dressing with Stand-by Assistance.  LE Dressing with Contact Guard Assistance.  Grooming while standing with Supervision.  Toileting from bedside commode with Stand-by Assistance for hygiene and clothing management.   Toilet transfer to bedside commode with Stand-by Assistance using LRAD.    Outcome: Ongoing, Progressing

## 2022-08-26 NOTE — HPI
Janusz Montgomery is a 77 y.o. 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 Dermatology consulted for rash to port site.     Patient had SCC to right shoulder treated with mohs 6/2021. In 3/2022, he reported painful right neck swelling and in 4/2022 had +LN for SCC. He had radical neck dissection in 5/22 and started chemoradiation in 6/22. He completed his chemoradiation 8/1/22. He reports redness to his right neck/shoulder area which has been present since the radiation treatment. He reports that 4 days ago he noticed redness and swelling overlying the port site. Was initially not tender, but has become tender over the past day or so. Denies fever/chills.

## 2022-08-26 NOTE — CONSULTS
[facility] - Oncology ([facility])  Dermatology  Consult Note    Patient Name: Janusz Montgomery Jr.  MRN: [id number]  Admission Date: 8/23/2022  Hospital Length of Stay: 2 days  Attending Physician: Mirlande Gupta MD  Primary Care Provider: Miguelina Weathers MD     Subjective:     Principal Problem:Infection of venous access port    HPI: Janusz Montgomery is a 77 y.o. 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 Dermatology consulted for rash to port site.     Patient had SCC to right shoulder treated with mohs 6/2021. In 3/2022, he reported painful right neck swelling and in 4/2022 had +LN for SCC. He had radical neck dissection in 5/22 and started chemoradiation in 6/22. He completed his chemoradiation 8/1/22. He reports redness to his right neck/shoulder area which has been present since the radiation treatment. He reports that 4 days ago he noticed redness overlying the port site and then the next day (3 days ago) noticed 2 swollen nodules develop. Was initially not tender, but has become tender over the past day or so. Denies fever/chills.     Medications:  Continuous Infusions:   heparin (porcine) in D5W Stopped (08/26/22 1050)     Scheduled Meds:   acyclovir  200 mg Oral BID    amiodarone  200 mg Oral Daily    atorvastatin  40 mg Oral QHS    cefTRIAXone (ROCEPHIN) IVPB  1 g Intravenous Q24H    [START ON 8/27/2022] furosemide  40 mg Oral Daily    gabapentin  200 mg Oral Daily    melatonin  9 mg Oral QHS    metoprolol succinate  25 mg Oral Daily    oxybutynin  5 mg Oral QHS    pantoprazole  40 mg Oral Daily    polyethylene glycol  17 g Oral BID    tamsulosin  1 capsule Oral QHS    vancomycin  125 mg Oral Q12H    vancomycin (VANCOCIN) IVPB  1,000 mg Intravenous Q24H    venlafaxine   75 mg Oral QHS     PRN Meds:acetaminophen, aluminum-magnesium hydroxide-simethicone, artificial tears, dextrose 10%, dextrose 10%, glucagon (human recombinant), glucose, glucose, heparin (PORCINE), heparin (PORCINE), HYDROcodone-acetaminophen, naloxone, ondansetron, promethazine, senna-docusate 8.6-50 mg, simethicone, sodium chloride 0.9%, Pharmacy to dose Vancomycin consult **AND** vancomycin - pharmacy to dose    Review of Systems   Constitutional:  Negative for fever and chills.   Skin:  Positive for rash.   Objective:     Vital Signs (Most Recent):  Temp: 98 °F (36.7 °C) (08/26/22 1119)  Pulse: 67 (08/26/22 1119)  Resp: 16 (08/26/22 0818)  BP: (!) 120/53 (08/26/22 1119)  SpO2: 96 % (08/26/22 1119)   Vital Signs (24h Range):  Temp:  [97 °F (36.1 °C)-98.5 °F (36.9 °C)] 98 °F (36.7 °C)  Pulse:  [67-79] 67  Resp:  [16-20] 16  SpO2:  [91 %-99 %] 96 %  BP: (107-134)/(53-60) 120/53     Weight: (!) 143.8 kg (317 lb 0.3 oz)  Body mass index is 41.83 kg/m².    Physical Exam   Constitutional: He appears well-developed and well-nourished.   Neurological: He is alert and oriented to person, place, and time.   Psychiatric: He has a normal mood and affect.   Skin:   Areas Examined (abnormalities noted in diagram):   Head / Face Inspection Performed  Neck Inspection Performed  Chest / Axilla Inspection Performed  Abdomen Inspection Performed  Annular erythematous plaque and 2 erythematous nodules overlying right chest port site   Faint erythematous patches to right neck and shoulder with some overlying serous crust              Significant Labs: Blood Culture: No results for input(s): LABBLOO in the last 48 hours.  CBC:   Recent Labs   Lab 08/25/22  0406 08/26/22  0232   WBC 5.97 5.57   HGB 7.2* 7.5*   HCT 21.4* 22.2*   * 142*     CMP:   Recent Labs   Lab 08/25/22  0406 08/26/22  0231    140   K 3.3* 3.5   CL 96 94*   CO2 37* 38*   GLU 98 100   BUN 16 14   CREATININE 1.8* 1.7*   CALCIUM 9.0 9.3   PROT 6.0 6.2    ALBUMIN 2.7* 2.9*   BILITOT 1.0 0.9   ALKPHOS 124 137*   AST 14 14   ALT 15 12   ANIONGAP 8 8       Significant Imaging: I have reviewed all pertinent imaging results/findings within the past 24 hours.      Assessment/Plan:     * Infection of venous access port  The acute nature of the presentation of the clinical findings are not consistent with malignancy; skin cancer does not acutely present in 3 days  Findings on exam (erythema and abscesses) and ultrasound support infection   Patient is having the port removed with IR and they are performing culture swabs   Antibiotics per ID  If lesion does not improve with appropriate antibiotics per culture findings/sensitivities, then can re-assess need for biopsy for tissue culture or consider generally surgery consult for incision and drainage with cultures      Humera Giron MD  Dermatology  [facility] - Oncology ([facility])

## 2022-08-26 NOTE — ASSESSMENT & PLAN NOTE
-- Suspect likely infection of right chest venous access port  -- No evidence of sepsis  -- Hemodynamically stable  -- Slightly elevated inflammatory markers  -- Two small indurated bumps concerning for possible abscesses    PLAN:  -- Continue vancomycin and ceftriaxone   -- F/u BCx   -- ID on board  -- Port removed by IR 8/26

## 2022-08-26 NOTE — PT/OT/SLP EVAL
Physical Therapy Co-Evaluation    Patient Name:  Janusz Montgomery Jr.   MRN:  [id number]    Recommendations:     Discharge Recommendations:  nursing facility, skilled   Discharge Equipment Recommendations: none   Barriers to discharge: Decreased caregiver support    Assessment:     Janusz Montgomery Jr. is a 77 y.o. male admitted with a medical diagnosis of Infection of venous access port.  He presents with the following impairments/functional limitations:  weakness, impaired endurance, impaired functional mobility, impaired self care skills, gait instability, impaired balance, decreased upper extremity function, decreased lower extremity function, impaired cardiopulmonary response to activity. Patient and family in room upon PT entry. Patient agreeable to PT session. Patient able to perform bed mobility with SBA. He was able to sit to stand transfer with CGA and RW. Patient was able to maintain static standing balance for approx. 10 seconds with CGA x2 and UE support on RW before reporting dizziness and unsteadiness. Therapist had patient attempt to take 2 side-steps towards HOB, but patient unable to at this time. At end of session, educated patient on seated LE exercises to perform for overall strengthening and conditioning of LEs including seated marching x10 on each leg, seated LAQs x10 on each leg, and seated hip adduction squeezes with pillow x10. Patient would continue to benefit from skilled acute PT services in order to improve overall strengthening and improve functional tolerance to standing for ADL and mobility skills.      Rehab Prognosis: Fair; patient would benefit from acute skilled PT services to address these deficits and reach maximum level of function.    Recent Surgery: * No surgery found *      Plan:     During this hospitalization, patient to be seen 4 x/week to address the identified rehab impairments via gait training, therapeutic activities, therapeutic exercises, neuromuscular re-education and  "progress toward the following goals:    · Plan of Care Expires:  09/25/22    Subjective     Chief Complaint: "My legs feel like they just give out"  Patient/Family Comments/goals: to be more independent and to be able to go home  Pain/Comfort:  · Pain Rating 1:  (Patient did not rate)    Patients cultural, spiritual, Anglican conflicts given the current situation: no    Living Environment:  Patient lives with spouse in [facility] with 0 JONATHAN. Patient has a walk-in tub with built-in bath bench.  Prior to admission, patients level of function was approx. Mod A for ADL's and mobility per family and patient report.  Equipment used at home: bedside commode, bath bench, cane, straight, CPAP, oxygen, walker, rolling. Upon discharge, patient and family are unaware of who patient will have assistance from, due to spouse being unable to assist and patient's current level.    Objective:     Communicated with nursing and OT prior to session.  Patient found supine with peripheral IV (Port)  upon PT entry to room.    General Precautions: Standard, fall   Orthopedic Precautions:N/A   Braces: N/A  Respiratory Status: Nasal cannula, flow 4 L/min    Exams:  · RUE ROM: limited due to R port cath  · RUE Strength: not tested due to R port cath  · LUE ROM: WFL  · LUE Strength: WFL  · RLE ROM: WFL  · RLE Strength: limited. Patient unable to take minimal resistance  · LLE ROM: WFL  · LLE Strength: limited. Patient unable to take minimal resistance    Functional Mobility:  · Bed Mobility:     · Rolling Left:  stand by assistance  · Scooting: stand by assistance  · Supine to Sit: stand by assistance  · Sit to Supine: stand by assistance  · Transfers:  Sit to Stand:  contact guard assistance with rolling walker  · Balance: Patient was able to maintain static standing balance for approx. 10 seconds with CGA x2 and UE support on RW before reporting dizziness and unsteadiness. Therapist had patient attempt to take 2 side-steps towards HOB, but " patient unable to at this time.     Therapeutic Activities and Exercises:   Educated patient and family on importance of PT role and PT POC during acute care stay. Also educated patient on pursed lip breathing for SOB during mobility. At end of session, educated patient on seated LE exercises to perform for overall strengthening and conditioning of LEs including seated marching x10 on each leg, seated LAQs x10 on each leg, and seated hip adduction squeezes with pillow x10.     AM-PAC 6 CLICK MOBILITY  Total Score:17     Patient left supine with all lines intact, call button in reach and family present.    GOALS:   Multidisciplinary Problems     Physical Therapy Goals        Problem: Physical Therapy    Goal Priority Disciplines Outcome Goal Variances Interventions   Physical Therapy Goal     PT, PT/OT Ongoing, Progressing     Description: Goals to be met by: 2022    Patient will increase functional independence with mobility by performin. Supine to sit with [place]  2. Sit to supine with [place]  3. Sit to stand transfer with [place] and RW.  4. Gait  x 20' feet with Contact Guard Assistance using Rolling Walker.   5. Lower extremity exercise program x10 reps per handout, with independence                     History:     Past Medical History:   Diagnosis Date    (HFpEF) heart failure with preserved ejection fraction 2022    NAT (acute kidney injury) 2017    ALLERGIC RHINITIS     Anemia     Anxiety     Basal cell carcinoma 10/19/2018    forehead and right medial shoulder    Basal cell carcinoma 2019    left nasal bridge and left posterior ear    Basal cell carcinoma 2020    left lower medial leg - Efudex    Basal cell carcinoma 2020    left medial upper eyelid canthus superficial BCC    Chronic rhinitis 2013    Chronic rhinitis 2013    Coronary artery disease involving native coronary artery of native heart without angina pectoris s/p  "RCA stent     Cortical cataract of both eyes 03/18/2016    Delayed sleep phase syndrome 03/13/2019    Depression     Erectile dysfunction 03/24/2014    Erectile dysfunction 03/24/2014    Essential hypertension     GERD (gastroesophageal reflux disease) 07/25/2012    Gout, arthritis     Grade III open fracture of left tibia and fibula s/p ex-fix on 7/1/16 and ORIF of left tibia on 7/15 07/06/2016    H/O: iritis     Helicobacter pylori (H. pylori) infection     Treated    Hepatitis     Herpes simplex keratoconjunctivitis 09/30/2015    - on acyclovir - followed by opthalmology, Dr. Uribe     Herpes simplex keratoconjunctivitis 09/30/2015    - on acyclovir - followed by opthalmology, Dr. Uribe     Hyperkalemia 02/28/2017    Hyperlipidemia     Hypogonadism male     Hypogonadism male     Immunodeficiency due to drug therapy 07/08/2022    Keloid cicatrix     Lymphoma     HEAD & NECK    Mixed anxiety and depressive disorder     Morbid obesity     Obstructive sleep apnea on CPAP     CPAP    On home oxygen therapy     Osteoarthritis of left knee 07/25/2012    Paroxysmal atrial fibrillation 07/06/2016    Primary osteoarthritis of left knee 07/25/2012    Prominent aorta 01/25/2016    "RESULTS: THE HEART IS MILDLY ENLARGED WITH A SLIGHTLY PROMINENT AORTA" - Xray Chest PA & Lateral 12-     Prostate cancer 02/15/2016    - followed by urology, Dr. Young     Prostate cancer 02/15/2016    - followed by urology, Dr. 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    chest and forehead    Squamous cell cancer of skin of nose     Traumatic type III open fracture of shaft of left tibia and fibula " with nonunion 07/06/2016    Type III open fracture of left tibia and fibula with routine healing 07/06/2016    Vitamin D deficiency disease     Vitreous detachment 03/18/2016       Past Surgical History:   Procedure Laterality Date    ADJACENT TISSUE TRANSFER Left 11/30/2020    Procedure: ADJACENT TISSUE TRANSFER x2 ;  Surgeon: Teresa Cooper MD;  Location: [facility] 1ST FLR;  Service: Ophthalmology;  Laterality: Left;  Left glabellar 8.5x11x3 and Left upper eyelid advancement flap [id number]      Cardiac stenting x2      CATARACT EXTRACTION W/  INTRAOCULAR LENS IMPLANT Right 3/29/2016    Dr. Conteh    CATARACT EXTRACTION W/  INTRAOCULAR LENS IMPLANT Left 4/12/2016        COLONOSCOPY N/A 7/23/2020    Procedure: COLONOSCOPY;  Surgeon: Nico Lackey MD;  Location: [facility] (2ND FLR);  Service: Endoscopy;  Laterality: N/A;    DIRECT LARYNGOSCOPY N/A 5/4/2022    Procedure: LARYNGOSCOPY, DIRECT possible biopsy;  Surgeon: Etta Higgins MD;  Location: [facility] OR;  Service: ENT;  Laterality: N/A;  RN PREOP ON 4/29/22. NEEDS T&S DAY OF SURGERY--NF  CONSENTS DAY OF SURGERY    DISSECTION OF NECK Right 5/4/2022    Procedure: RADICAL NECK DISSECTION;  Surgeon: Etta Higgins MD;  Location: [facility] OR;  Service: ENT;  Laterality: Right;  TO FOLLOW DR ALMAZAN    ESOPHAGOGASTRODUODENOSCOPY N/A 7/23/2020    Procedure: EGD (ESOPHAGOGASTRODUODENOSCOPY);  Surgeon: Nico Lackey MD;  Location: [facility] (2ND FLR);  Service: Endoscopy;  Laterality: N/A;  per Dr Lackey-Will proceed with EGD and colonoscopy on the 2nd floor due to his comorbidities including obesity, sleep apnea, restrictive lung disease, coronary artery disease.  has loop recorder      ok to hold Eliquis 2 days per Dr Sandhu-must remain    EXTERNAL FIXATION TIBIAL FRACTURE Left 07/01/2016    INSERTION OF IMPLANTABLE LOOP RECORDER  04/07/2017    LEFT HEART CATHETERIZATION Left 1/30/2020    Procedure: Left heart cath;  Surgeon: Benjamin HATFIELD  "MD Edson;  Location: [facility] CATH LAB;  Service: Cardiology;  Laterality: Left;  RN PREOP 1/28/20--Pt starting Plavix loading dose today (8pills)- Dr. Sandhu aware.  Pt has a bandaged "non healing area to LLE"--Dr. Sandhu aware.    LEFT HEART CATHETERIZATION Left 2/14/2020    Procedure: Left heart cath, IVUS guided left main / LAD PCI. Noon start, radial approach;  Surgeon: Miguel Angel Brady MD;  Location: [facility] CATH LAB;  Service: Cardiology;  Laterality: Left;  RN PRE OP 2-7-2020  BMI--45.61    ORIF TIBIA FRACTURE Left 07/15/2016    PROBING OF NASOLACRIMAL DUCT WITH INSERTION OF TUBE Left 11/30/2020    Procedure: PROBING, NASOLACRIMAL DUCT,;  Surgeon: Teresa Cooper MD;  Location: [address];  Service: Ophthalmology;  Laterality: Left;    RADIOACTIVE SEED IMPLANTATION OF PROSTATE N/A 8/8/2018    Procedure: INSERTION, RADIOACTIVE SEED, PROSTATE;  Surgeon: Bipin Thompson MD;  Location: [address];  Service: Urology;  Laterality: N/A;  1 hour    SKIN BIOPSY      Squamous cell cancer removal x3 with Mohs surgery      TONSILLECTOMY      TOTAL KNEE ARTHROPLASTY  10/2012    TRIGGER FINGER RELEASE Right 10/14/2020    Procedure: RELEASE, TRIGGER FINGER - right;  Surgeon: Rad Swift MD;  Location: [facility];  Service: Orthopedics;  Laterality: Right;    trus/bx      ULTRASOUND GUIDANCE  2/14/2020    Procedure: Ultrasound Guidance;  Surgeon: Miguel Angel Brady MD;  Location: [facility] CATH LAB;  Service: Cardiology;;       Time Tracking:     PT Received On: 08/26/22  PT Start Time: 0954     PT Stop Time: 1026  PT Total Time (min): 32 min     Billable Minutes: Evaluation 9 minutes, Therapeutic Activity 11 minutes and Therapeutic Exercise 11 minutes      08/26/2022  "

## 2022-08-26 NOTE — PLAN OF CARE
Problem: Physical Therapy  Goal: Physical Therapy Goal  Description: Goals to be met by: 2022    Patient will increase functional independence with mobility by performin. Supine to sit with [place]  2. Sit to supine with [place]  3. Sit to stand transfer with [place] and RW.  4. Gait  x 20' feet with Contact Guard Assistance using Rolling Walker.   5. Lower extremity exercise program x10 reps per handout, with independence    Outcome: Ongoing, Progressing     Patient evaluation completed. Goals appropriate.

## 2022-08-26 NOTE — TELEPHONE ENCOUNTER
----- Message from Norma Riley sent at 8/23/2022  1:08 PM CDT -----  Type: Patient Call Back    Who called Radha     What is the request in detail:Pt daughter is asking for a call back no one called her back yesterday she states it important     Can the clinic reply by [facility]?    Would the patient rather a call back or a response via [facility]?     Best call back number: [phone]

## 2022-08-26 NOTE — HOSPITAL COURSE
Here he was found to havea suspected port infection. ID was consulted and started him on vanc/ceftriaxone. ID was also c/f the atypical appearance  of the nodules adjacent to the port and recommended skin biopsy for met workup vs atypical pathogen nodule. IR was consulted to remove port. Derm was consulted for skin biopsy but did not agree  that it was indicated. Pathology was consulted for possible  FNA if IR was unable to biopsy while removing port. Patient also found to have  DVT of the  RIJ and subclavian. Was started on heparin gtt per IR recs. Intermittently supra therapeutic .  Port removed 8/26 by IR. Duonebs ordered for SOB and wheezing. Patient had worsening cellulitis while transitioned from PO keflex and doxy, switched back to IV antibiotics.       Patient decided against SNF, now deciding between home hospice and home health or [facility]IM program. Family and patient ultimately decided on home with home hospice.  Consulted ID and will discharge on PO doxy and cefpodoxime. Discussed ongoing anticoagulation with patient. Will continue apixaban as previously noted DVT likely due to instrumentation; family also prefers ease of PO medication.

## 2022-08-26 NOTE — TELEPHONE ENCOUNTER
OBC TO PT SPOKE WITH STEP DAUGHTER ISAAC, ADVISERD PT IS CURRENTLY HOSPITALIZED AND HAS APPT ON MONDAY AT w/ DR JONES, WANTED TO KNOW IF PHYSICIAN

## 2022-08-26 NOTE — PROGRESS NOTES
[facility] Infectious Diseases Progress Note    Assessment/Plan:     Janusz Montgomery 77M hx squamous cell H/N cancer s/p radical neck dissection and chemoradiation who presents w/ possible port infection. R chest port w/ overlying erythema and new nodular skin lesions present. Nodular lesions are atypical, and concerning for malignancy vs. Atypical infection (nocardia, fungal, mycobacterial). Blood cx NGTD. Chest US w/ fluid collection surrounding port. Pt on vanc and ceftriaxone. 8/24 RUE US DVT.    Infection of venous access port  - 8/26 CT neck: Rounded soft tissue attenuation nodules are seen around the patient's right chest port    - recommend port removal, send cultures from swab of pocket at time of removal - gram stain, aerobic, anaerobic, fungal, AFB, and modified AFB  - would also obtain tissue biopsy of nodular lesion to send for path given atypical appearance - r/o malignancy, send for GMS and AFB stains  - removal line and obtainment of the aforementioned studies (8/26)    - 8/24 BCx NG so far  - continue IV vanc and ceftriaxone for now  - C. Diff Ag+ -- 8/25 restarted PO vancomycin 125mg BID     Louie Fuentes MD  [facility] Infectious Diseases  Cell: [phone]    Thank you for allowing us to participate in the care of this patient. We will continue to follow along. Case has been discussed with consult staff, who is in agreement with assessment and plan. ID will continue to follow.    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 Completed chemoradiation therapy on 8/1/22. Pt endorses some erythema to R side of neck that he attributed to radiation treatments, however states this progressed to involve the area overlying his port, with sudden development of tender nodular lesion on chest. He denies any  fevers at home. Also endorses increased redness of R>L lower extremities w/ increased swelling and tenderness. Of note, recently completed a 7 day course of PO vancomycin for c. Diff infection, not currently complaining of diarrhea. Blood cultures obtained, and patient has been started on vanc and ceftriaxone for possible port infection. US of chest shows fluid collection surrounding port. ID consulted for assistance.     Interval events: Some more discomfort at port area, denies f/c/ns/nv     Objective:   Last 24 Hour Vital Signs:  BP  Min: 107/53  Max: 134/60  Temp  Av.8 °F (36.6 °C)  Min: 97 °F (36.1 °C)  Max: 98.5 °F (36.9 °C)  Pulse  Av.4  Min: 69  Max: 79  Resp  Av  Min: 16  Max: 20  SpO2  Av.7 %  Min: 91 %  Max: 99 %  I/O last 3 completed shifts:  In: 720 [P.O.:720]  Out: 2900 [Urine:2900]    Physical Exam  Constitutional:       General: He is not in acute distress.     Appearance: Normal appearance. He is well-developed. He is not ill-appearing or diaphoretic.   HENT:      Head: Normocephalic and atraumatic.      Right Ear: External ear normal.      Left Ear: External ear normal.      Nose: Nose normal.   Eyes:      General: No scleral icterus.        Right eye: No discharge.         Left eye: No discharge.      Extraocular Movements: Extraocular movements intact.      Conjunctiva/sclera: Conjunctivae normal.   Neck:      Comments: R neck erythema   Pulmonary:      Effort: Pulmonary effort is normal. No respiratory distress.      Breath sounds: No stridor.   Musculoskeletal:      Right lower leg: Edema present.      Left lower leg: Edema present.      Comments: B/l LE erythema R>L, warmth and tenderness   Skin:     General: Skin is dry.      Coloration: Skin is not jaundiced or pale.      Findings: Erythema and lesion present.      Comments: Erythema of R chest overlying port  No fluctuance  Atypical appearing nodular skin lesion present  Neurological:      General: No focal deficit  present.      Mental Status: He is alert and oriented to person, place, and time. Mental status is at baseline.   Psychiatric:         Mood and Affect: Mood normal.         Behavior: Behavior normal.         Thought Content: Thought content normal.         Judgment: Judgment normal.     Laboratory:  Laboratory Data Reviewed: yes  Pertinent Findings:  Recent Labs   Lab 08/24/22  0303 08/25/22  0406 08/26/22  0231 08/26/22  0232   WBC 6.72 5.97  --  5.57   HGB 7.6* 7.2*  --  7.5*   HCT 22.7* 21.4*  --  22.2*   * 142*  --  142*   * 104*  --  108*   RDW 17.5* 17.3*  --  17.5*    141 140  --    K 3.8 3.3* 3.5  --    CL 98 96 94*  --    CO2 36* 37* 38*  --    BUN 14 16 14  --    CREATININE 1.9* 1.8* 1.7*  --     98 100  --    PROT 6.0 6.0 6.2  --    ALBUMIN 2.8* 2.7* 2.9*  --    BILITOT 1.0 1.0 0.9  --    AST 18 14 14  --    ALKPHOS 139* 124 137*  --    ALT 16 15 12  --          Microbiology Data:  Microbiology Results (last 7 days)     Procedure Component Value Units Date/Time    Blood culture [[id number]] Collected: 08/24/22 0113    Order Status: Completed Specimen: Blood from Peripheral, Lower Arm, Right Updated: 08/26/22 0613     Blood Culture, Routine No Growth to date      No Growth to date      No Growth to date    Blood culture [[id number]] Collected: 08/24/22 0112    Order Status: Completed Specimen: Blood from Peripheral, Hand, Right Updated: 08/26/22 0613     Blood Culture, Routine No Growth to date      No Growth to date      No Growth to date            Antimicrobials:  Antibiotics (From admission, onward)            Start     Stop Route Frequency Ordered    08/26/22 0830  vancomycin in dextrose 5 % 1 gram/250 mL IVPB 1,000 mg         -- IV Every 24 hours (non-standard times) 08/26/22 0744    08/25/22 1015  vancomycin 125 mg/5 mL oral solution 125 mg  (C. difficile Infection (CDI) Treatment Order Panel)         -- Oral Every 12 hours 08/25/22 0904    08/24/22 1215  cefTRIAXone  "(ROCEPHIN) 1 g/50 mL D5W IVPB         -- IV Every 24 hours (non-standard times) 08/24/22 1113    08/24/22 0151  vancomycin - pharmacy to dose  (vancomycin IVPB)        "And" Linked Group Details    -- IV pharmacy to manage frequency 08/24/22 0051        Antifungals (From admission, onward)            None        Antivirals (From admission, onward)        Stop Route Frequency     acyclovir         -- Oral 2 times daily            Other Results:  Radiology Results:  X-Ray Chest PA And Lateral    Result Date: 7/29/2022  EXAMINATION: XR CHEST PA AND LATERAL CLINICAL HISTORY: shortness of breath; Shortness of breath TECHNIQUE: PA and lateral views of the chest were performed. COMPARISON: Chest radiograph 06/10/2022 FINDINGS: Interval placement of an RIJV-approach infusion port catheter with terminus projecting over the expected location of the caval atrial junction. Cardiomediastinal silhouette is within normal limits. Thin persistent trace loculated pleural effusion extends along the posterior aspect of the left hemithorax, unchanged.  No appreciable new focal consolidation, overt interstitial edema, sizable pleural effusion or pneumothorax.  Bilateral calcified pleural plaques, unchanged. Multilevel degenerative changes of the imaged spine.     1. No acute cardiopulmonary process appreciated. Electronically signed by: Álvaro Comer Date:    07/29/2022 Time:    12:45    US Chest Mediastinum    Result Date: 8/24/2022  EXAMINATION: US CHEST MEDIASTINUM CLINICAL HISTORY: swelling overlying port in R chest wall;. COMPARISON: Chest radiograph 08/23/2022.  CT chest 02/22/2022. TECHNIQUE: Limited ultrasound evaluation of right chest wall FINDINGS: Heterogeneous collection about the right chest port measures approximately 3.8 x 1.5 x 2.8 cm.     1. Heterogeneous collection about the right chest port, could represent hematoma. This report was flagged in Epic as abnormal. Electronically signed by resident: Stacey Wall " Date:    08/24/2022 Time:    00:42 Electronically signed by: Abdirizak Valadez MD Date:    08/24/2022 Time:    01:03    X-Ray Chest AP Portable    Result Date: 8/23/2022  EXAMINATION: XR CHEST AP PORTABLE CLINICAL HISTORY: shortness of breath, edema; TECHNIQUE: Single frontal view of the chest was performed. COMPARISON: 08/10/2022. FINDINGS: Lordotic positioning.  Right-sided port catheter tip overlies the right atrium.  Loop recorder device overlies the left hemithorax. Underinflated lungs with hypoventilatory change.  Subsegmental atelectasis at the lung bases.  Calcified pleural plaques, similar to prior. Heart and lungs otherwise appear unchanged when allowing for differences in technique and positioning.     Lordotic positioning. Right-sided port catheter tip overlies the right atrium. Loop recorder device overlies the left hemithorax. Underinflated lungs with hypoventilatory change.  Subsegmental atelectasis at the lung bases. Calcified pleural plaques, similar to prior. Electronically signed by: Abdirizak Valadez MD Date:    08/23/2022 Time:    21:36    X-Ray Chest AP Portable    Result Date: 8/10/2022  EXAMINATION: XR CHEST AP PORTABLE CLINICAL HISTORY: Sepsis; TECHNIQUE: Single frontal view of the chest was performed. COMPARISON: 07/29/2022. FINDINGS: Cardiac silhouette is normal in size.  Right-sided chest port is visualized in stable position.  Loop recorder device is noted.  Lungs are hypoinflated.  Calcified pleural plaques are seen.  No evidence of new focal consolidative process, pneumothorax, or significant pleural effusion.  No acute osseous abnormality identified.     No acute cardiopulmonary process identified. Electronically signed by: Boom Wilson MD Date:    08/10/2022 Time:    23:48    US Upper Extremity Veins Right    Result Date: 8/24/2022  EXAMINATION: US UPPER EXTREMITY VEINS RIGHT CLINICAL HISTORY: rule out DVT; TECHNIQUE: Duplex and color flow Doppler evaluation and dynamic compression was  performed of the right upper extremity veins. COMPARISON: CT soft tissue neck 04/12/2022. FINDINGS: Central veins: Thrombosis in the right internal jugular vein.  Partially occlusive thrombus in the right subclavian vein.  The axillary vein is patent and free of thrombus. Arm veins: The brachial, basilic, and cephalic veins are patent and compressible. Contralateral subclavian/internal jugular veins: The left subclavian and internal jugular veins are patent and free of thrombus. Other findings: None.     Deep vein thrombosis in the right internal jugular vein and right subclavian vein. This report was flagged in Epic as abnormal. COMMUNICATION This critical result was discovered/received at 05:30.  The critical information above was relayed directly by me by telephone to Jamila Kennedy DO on 08/24/2022 at 05:30. Electronically signed by resident: Stacey Wall Date:    08/24/2022 Time:    05:31 Electronically signed by: Isidro Uribe Date:    08/24/2022 Time:    06:05      Current Medications:     Infusions:   heparin (porcine) in D5W 18 Units/kg/hr (08/26/22 0452)        Scheduled:   acyclovir  200 mg Oral BID    amiodarone  200 mg Oral Daily    atorvastatin  40 mg Oral QHS    cefTRIAXone (ROCEPHIN) IVPB  1 g Intravenous Q24H    furosemide (LASIX) injection  40 mg Intravenous Daily    gabapentin  200 mg Oral Daily    melatonin  9 mg Oral QHS    metoprolol succinate  25 mg Oral Daily    oxybutynin  5 mg Oral QHS    pantoprazole  40 mg Oral Daily    polyethylene glycol  17 g Oral BID    tamsulosin  1 capsule Oral QHS    vancomycin  125 mg Oral Q12H    vancomycin (VANCOCIN) IVPB  1,000 mg Intravenous Q24H    venlafaxine  75 mg Oral QHS        PRN:  acetaminophen, aluminum-magnesium hydroxide-simethicone, artificial tears, dextrose 10%, dextrose 10%, glucagon (human recombinant), glucose, glucose, heparin (PORCINE), heparin (PORCINE), HYDROcodone-acetaminophen, naloxone, ondansetron, promethazine,  senna-docusate 8.6-50 mg, simethicone, sodium chloride 0.9%, Pharmacy to dose Vancomycin consult **AND** vancomycin - pharmacy to dose

## 2022-08-26 NOTE — NURSING
Received report from LORIRN   changed pt linens in his room educated him about ambulating with gripper socks on to promote safety.    0300 Paged provider about fixing Heparin medication order as the daytime providers switched from monitoring  labs from anti-XA to APTT.  0324 paged pharmacy for medication refill

## 2022-08-26 NOTE — PROGRESS NOTES
[facility] ([facility])  Hematology/Oncology  Progress Note    Patient Name: Janusz Montgomery Jr.  Admission Date: 8/23/2022  Hospital Length of Stay: 2 days  Code Status: Full Code     Subjective:     HPI:  Janusz Montgomery is a 77 y.o. 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 Follows with Dr. Hargrove. Completed chemoradiation therapy on 8/1/22. Reports some redness to right neck which he attributes to radiation. However, has since developed redness, swelling, and tenderness overlying right chest port concerning for possible infection over last 2-3 days. Denies fever or chills. Increasing edema in extremities worse in RUE over last 4-5 days and generalized weakness causing difficulty ambulating. Recently hospitalized with dehydration, NAT, weakness, C dif. Completed vancomycin PO 7 days and diarrhea resolved. Rehydrated and concern that he then became overloaded. Cr uptrending on discharge. Recently saw nephro 8/22 for hospital f/u, noted worsening Cr baseline and increased Lasix to 40 mg daily.      Interval History: NAEON.  Patient denies any worsening pain or fever. No N/V. Plan for port removal  today    Oncology Treatment Plan:   OP CARBOPLATIN WEEKLY    Medications:  Continuous Infusions:   ceFAZolin 1 g/50ml Dextrose IVPB      heparin (porcine) in D5W Stopped (08/26/22 1050)     Scheduled Meds:   acyclovir  200 mg Oral BID    amiodarone  200 mg Oral Daily    atorvastatin  40 mg Oral QHS    cefTRIAXone (ROCEPHIN) IVPB  1 g Intravenous Q24H    [START ON 8/27/2022] furosemide  40 mg Oral Daily    gabapentin  200 mg Oral Daily    melatonin  9 mg Oral QHS    metoprolol succinate  25 mg Oral Daily    oxybutynin  5 mg Oral QHS    pantoprazole  40 mg Oral Daily     polyethylene glycol  17 g Oral BID    tamsulosin  1 capsule Oral QHS    vancomycin  125 mg Oral Q12H    vancomycin (VANCOCIN) IVPB  1,000 mg Intravenous Q24H    venlafaxine  75 mg Oral QHS     PRN Meds:acetaminophen, aluminum-magnesium hydroxide-simethicone, artificial tears, ceFAZolin 1 g/50ml Dextrose IVPB, dextrose 10%, dextrose 10%, glucagon (human recombinant), glucose, glucose, heparin (PORCINE), heparin (PORCINE), HYDROcodone-acetaminophen, LIDOcaine HCL 10 mg/ml (1%), naloxone, ondansetron, promethazine, senna-docusate 8.6-50 mg, simethicone, sodium chloride 0.9%, Pharmacy to dose Vancomycin consult **AND** vancomycin - pharmacy to dose     Review of Systems   Constitutional:  Positive for chills and fatigue.   HENT:  Negative for sore throat.    Respiratory:  Negative for cough and shortness of breath.    Cardiovascular:  Positive for leg swelling. Negative for chest pain.   Gastrointestinal:  Negative for abdominal pain, constipation, diarrhea, nausea and vomiting.   Genitourinary:  Negative for dysuria.   Musculoskeletal:  Negative for myalgias.   Skin:  Positive for color change.   Neurological:  Positive for weakness. Negative for dizziness and headaches.   Psychiatric/Behavioral:  Negative for confusion.    Objective:     Vital Signs (Most Recent):  Temp: 98 °F (36.7 °C) (08/26/22 1119)  Pulse: 67 (08/26/22 1119)  Resp: 16 (08/26/22 0818)  BP: (!) 120/53 (08/26/22 1119)  SpO2: 96 % (08/26/22 1119)   Vital Signs (24h Range):  Temp:  [97 °F (36.1 °C)-98.5 °F (36.9 °C)] 98 °F (36.7 °C)  Pulse:  [67-79] 67  Resp:  [16-20] 16  SpO2:  [91 %-98 %] 96 %  BP: (107-134)/(53-60) 120/53     Weight: (!) 143.8 kg (317 lb 0.3 oz)  Body mass index is 41.83 kg/m².  Body surface area is 2.72 meters squared.      Intake/Output Summary (Last 24 hours) at 8/26/2022 1609  Last data filed at 8/26/2022 1225  Gross per 24 hour   Intake 360 ml   Output 2350 ml   Net -1990 ml       Physical Exam  Constitutional:        General: He is not in acute distress.     Appearance: He is ill-appearing.   HENT:      Head: Normocephalic and atraumatic.      Mouth/Throat:      Mouth: Mucous membranes are moist.      Pharynx: Oropharynx is clear.   Eyes:      Extraocular Movements: Extraocular movements intact.      Pupils: Pupils are equal, round, and reactive to light.   Neck:      Comments: Right neck erythema  Cardiovascular:      Rate and Rhythm: Normal rate and regular rhythm.      Pulses: Normal pulses.      Heart sounds: Normal heart sounds.   Pulmonary:      Effort: Pulmonary effort is normal. No respiratory distress.      Breath sounds: Normal breath sounds.   Abdominal:      General: Abdomen is flat. Bowel sounds are normal.      Palpations: Abdomen is soft.   Musculoskeletal:      Cervical back: Neck supple.      Right lower leg: Edema present.      Left lower leg: Edema present.   Skin:     General: Skin is warm and dry.      Capillary Refill: Capillary refill takes less than 2 seconds.      Findings: Erythema (erythema, edema, tenderness, warmth overlying right chest port with two indurated bumps) present.   Neurological:      General: No focal deficit present.      Mental Status: He is alert and oriented to person, place, and time. Mental status is at baseline.   Psychiatric:         Mood and Affect: Mood normal.         Behavior: Behavior normal.         Thought Content: Thought content normal.         Judgment: Judgment normal.       Significant Labs:   Recent Lab Results  (Last 5 results in the past 24 hours)        08/26/22  1107   08/26/22  0232   08/26/22  0231   08/26/22  0009   08/25/22  1710        Albumin     2.9           Alkaline Phosphatase     137           ALT     12           Anion Gap     8           aPTT >150.0  Comment: aPTT therapeutic range = 39-69 seconds  APTT critical result(s) called and verbal readback obtained from   Rachel Green RN by [email or web address] 08/26/2022 11:47     36.5  Comment: aPTT therapeutic  range = 39-69 seconds     34.9  Comment: aPTT therapeutic range = 39-69 seconds   68.0  Comment: aPTT therapeutic range = 39-69 seconds       AST     14           Baso #   0.03             Basophil %   0.5             BILIRUBIN TOTAL     0.9  Comment: For infants and newborns, interpretation of results should be based  on gestational age, weight and in agreement with clinical  observations.    Premature Infant recommended reference ranges:  Up to 24 hours.............<8.0 mg/dL  Up to 48 hours............<12.0 mg/dL  3-5 days..................<15.0 mg/dL  6-29 days.................<15.0 mg/dL             BUN     14           Calcium     9.3           Chloride     94           CO2     38           Creatinine     1.7           Differential Method   Automated             eGFR     41.5           Eos #   0.1             Eosinophil %   1.4             Glucose     100           Gran # (ANC)   4.3             Gran %   77.8             Hematocrit   22.2             Hemoglobin   7.5             Immature Grans (Abs)   0.01  Comment: Mild elevation in immature granulocytes is non specific and   can be seen in a variety of conditions including stress response,   acute inflammation, trauma and pregnancy. Correlation with other   laboratory and clinical findings is essential.               Immature Granulocytes   0.2             INR   1.1  Comment: Coumadin Therapy:  2.0 - 3.0 for INR for all indicators except mechanical heart valves  and antiphospholipid syndromes which should use 2.5 - 3.5.               Lymph #   0.5             Lymph %   8.6             Magnesium     1.6           MCH   36.4             MCHC   33.8             MCV   108             Mono #   0.6             Mono %   11.5             MPV   10.6             nRBC   0             Phosphorus     3.1           Platelets   142             Potassium     3.5           PROTEIN TOTAL     6.2           Protime   11.4             RBC   2.06             RDW   17.5              Sodium     140           Vancomycin, Random     18.5           WBC   5.57                                    Diagnostic Results:  I have reviewed all pertinent imaging results/findings within the past 24 hours.    Assessment/Plan:     * Infection of venous access port  -- Suspect likely infection of right chest venous access port  -- No evidence of sepsis  -- Hemodynamically stable  -- Slightly elevated inflammatory markers  -- Two small indurated bumps concerning for possible abscesses    PLAN:  -- Continue vancomycin and ceftriaxone   -- F/u BCx   -- ID on board  -- Port removed by IR 8/26     Acute DVT (deep venous thrombosis)  On heparin drip  Adjust based on APTT levels   Currently held for port removal    Acute decompensated heart failure  -- Worsening edema, seems to have progressed more since fluid resuscitation on recent hospitalization.   -- Requiring recent increases in Lasix 20 mg from PRN to daily to 40 mg daily  -- BNP upper level normal. Although likely falsely low in setting of obesity  -- Volume overloaded on exam  -- Lasix 40 mg IV transitioned to 40mg PO  -- Low sodium diet, which he has some non-adherence to per recent cardiology visit  -- Follows closely with cardiology. Scheduled to see 8/29      Chronic respiratory failure with hypoxia  Patient with Hypoxic Respiratory failure which is Chronic.  he is on home oxygen at 4 LPM. Supplemental oxygen was provided and noted-  .   Signs/symptoms of respiratory failure include- lethargy. Contributing diagnoses includes - CHF, restrictive lung disease. Labs and images were reviewed. Patient Has not had a recent ABG. Will treat underlying causes and adjust management of respiratory failure as follows- Continue home 4L O2    Head and neck cancer  -- Squamous cell carcinoma of the right head and neck s/p radical neck dissection and chemoradiation with carboplatin, completed 8/1/22. Follows with Dr. Hargrove.     Paroxysmal atrial fibrillation   -- Continue  home amiodarone 200 mg daily, metoprolol succinate 25 mg daily  -- F/u with his cardiologist (Dr. Sandhu) as scheduled  -- Eliquis 5mg transitioned  To  Heparin gtt for DVT on Eliquis              Ilan Cueva DO  Hematology/Oncology  [facility] ([facility])

## 2022-08-26 NOTE — PLAN OF CARE
Pt arrived to 190 for right chest port removal with local anesthesia only. Pt oriented to unit and staff. Plan of care reviewed with patient, patient verbalizes understanding. Comfort measures utilized. Pt safely transferred from stretcher to procedural table. Fall risk reviewed with patient, fall risk interventions maintained with arm boards and direct observation/attendance.  positioner pillows utilized to minimize pressure points. Blankets applied. Pt prepped and draped utilizing standard sterile technique. Patient placed on continuous monitoring, as required by sedation policy. Timeouts completed utilizing standard universal time-out, per department and facility policy. RN to remain at bedside, continuous monitoring maintained. Pt resting comfortably. Denies pain/discomfort. Will continue to monitor. See flow sheets for monitoring, medication administration, and updates.

## 2022-08-26 NOTE — H&P
Inpatient Radiology Pre-procedure Note    History of Present Illness:  Janusz Montgomery Jr. is a 77 y.o. 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 US imaging showed fluid collection adjacent to the port, concerning for hematoma vs abscess. DVT study showed thrombosis of RIJ and R subclavian vein. IR consulted for port removal per ID recs.     Admission H&P reviewed.    Past Medical History:   Diagnosis Date    (HFpEF) heart failure with preserved ejection fraction 02/04/2022    NAT (acute kidney injury) 03/19/2017    ALLERGIC RHINITIS     Anemia     Anxiety     Basal cell carcinoma 10/19/2018    forehead and right medial shoulder    Basal cell carcinoma 01/09/2019    left nasal bridge and left posterior ear    Basal cell carcinoma 06/12/2020    left lower medial leg - Efudex    Basal cell carcinoma 11/30/2020    left medial upper eyelid canthus superficial BCC    Chronic rhinitis 05/03/2013    Chronic rhinitis 05/03/2013    Coronary artery disease involving native coronary artery of native heart without angina pectoris s/p RCA stent     Cortical cataract of both eyes 03/18/2016    Delayed sleep phase syndrome 03/13/2019    Depression     Erectile dysfunction 03/24/2014    Erectile dysfunction 03/24/2014    Essential hypertension     GERD (gastroesophageal reflux disease) 07/25/2012    Gout, arthritis     Grade III open fracture of left tibia and fibula s/p ex-fix on 7/1/16 and ORIF of left tibia on 7/15 07/06/2016    H/O: iritis     Helicobacter pylori (H. pylori) infection     Treated    Hepatitis     Herpes simplex keratoconjunctivitis 09/30/2015    - on acyclovir - followed by opthalmology, Dr. Uribe     Herpes simplex keratoconjunctivitis 09/30/2015    - on acyclovir - followed by opthalmologyDr. Uribe     Hyperkalemia 02/28/2017    Hyperlipidemia     Hypogonadism male      "Hypogonadism male     Immunodeficiency due to drug therapy 07/08/2022    Keloid cicatrix     Lymphoma     HEAD & NECK    Mixed anxiety and depressive disorder     Morbid obesity     Obstructive sleep apnea on CPAP     CPAP    On home oxygen therapy     Osteoarthritis of left knee 07/25/2012    Paroxysmal atrial fibrillation 07/06/2016    Primary osteoarthritis of left knee 07/25/2012    Prominent aorta 01/25/2016    "RESULTS: THE HEART IS MILDLY ENLARGED WITH A SLIGHTLY PROMINENT AORTA" - Xray Chest PA & Lateral 12-     Prostate cancer 02/15/2016    - followed by urology, Dr. Young     Prostate cancer 02/15/2016    - followed by urology, Dr. 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    chest and forehead    Squamous cell cancer of skin of nose     Traumatic type III open fracture of shaft of left tibia and fibula with nonunion 07/06/2016    Type III open fracture of left tibia and fibula with routine healing 07/06/2016    Vitamin D deficiency disease     Vitreous detachment 03/18/2016     Past Surgical History:   Procedure Laterality Date    ADJACENT TISSUE TRANSFER Left 11/30/2020    Procedure: ADJACENT TISSUE TRANSFER x2 ;  Surgeon: Teresa Cooper MD;  Location: [address];  Service: Ophthalmology;  Laterality: Left;  Left glabellar 8.5x11x3 and Left upper eyelid advancement flap [id number]      Cardiac stenting x2      CATARACT EXTRACTION W/  INTRAOCULAR LENS IMPLANT Right 3/29/2016    Dr. Conteh    CATARACT EXTRACTION W/  INTRAOCULAR LENS IMPLANT Left 4/12/2016        COLONOSCOPY N/A 7/23/2020    Procedure: COLONOSCOPY;  Surgeon: Nico Lackey MD;  " "Location: [facility] (2ND FLR);  Service: Endoscopy;  Laterality: N/A;    DIRECT LARYNGOSCOPY N/A 5/4/2022    Procedure: LARYNGOSCOPY, DIRECT possible biopsy;  Surgeon: Etta Higgins MD;  Location: [facility] OR;  Service: ENT;  Laterality: N/A;  RN PREOP ON 4/29/22. NEEDS T&S DAY OF SURGERY--NF  CONSENTS DAY OF SURGERY    DISSECTION OF NECK Right 5/4/2022    Procedure: RADICAL NECK DISSECTION;  Surgeon: Etta Higgins MD;  Location: [facility] OR;  Service: ENT;  Laterality: Right;  TO FOLLOW DR ALMAZAN    ESOPHAGOGASTRODUODENOSCOPY N/A 7/23/2020    Procedure: EGD (ESOPHAGOGASTRODUODENOSCOPY);  Surgeon: Nico Lackey MD;  Location: [facility] (2ND FLR);  Service: Endoscopy;  Laterality: N/A;  per Dr Lackey-Will proceed with EGD and colonoscopy on the 2nd floor due to his comorbidities including obesity, sleep apnea, restrictive lung disease, coronary artery disease.  has loop recorder      ok to hold Eliquis 2 days per Dr Sandhu-must remain    EXTERNAL FIXATION TIBIAL FRACTURE Left 07/01/2016    INSERTION OF IMPLANTABLE LOOP RECORDER  04/07/2017    LEFT HEART CATHETERIZATION Left 1/30/2020    Procedure: Left heart cath;  Surgeon: Benjamin Sandhu MD;  Location: [facility] CATH LAB;  Service: Cardiology;  Laterality: Left;  RN PREOP 1/28/20--Pt starting Plavix loading dose today (8pills)- Dr. Sandhu aware.  Pt has a bandaged "non healing area to LLE"--Dr. Sandhu aware.    LEFT HEART CATHETERIZATION Left 2/14/2020    Procedure: Left heart cath, IVUS guided left main / LAD PCI. Noon start, radial approach;  Surgeon: Miguel Angel Brady MD;  Location: [facility] CATH LAB;  Service: Cardiology;  Laterality: Left;  RN PRE OP 2-7-2020  BMI--45.61    ORIF TIBIA FRACTURE Left 07/15/2016    PROBING OF NASOLACRIMAL DUCT WITH INSERTION OF TUBE Left 11/30/2020    Procedure: PROBING, NASOLACRIMAL DUCT,;  Surgeon: Teresa Cooper MD;  Location: [facility] 1ST FLR;  Service: Ophthalmology;  Laterality: Left;    RADIOACTIVE SEED IMPLANTATION OF " PROSTATE N/A 8/8/2018    Procedure: INSERTION, RADIOACTIVE SEED, PROSTATE;  Surgeon: Bipin Thompson MD;  Location: [facility] OR [address] [place] [address];  Service: Urology;  Laterality: N/A;  1 hour    SKIN BIOPSY      Squamous cell cancer removal x3 with Mohs surgery      TONSILLECTOMY      TOTAL KNEE ARTHROPLASTY  10/2012    TRIGGER FINGER RELEASE Right 10/14/2020    Procedure: RELEASE, TRIGGER FINGER - right;  Surgeon: Rad Swift MD;  Location: [facility] OR;  Service: Orthopedics;  Laterality: Right;    trus/bx      ULTRASOUND GUIDANCE  2/14/2020    Procedure: Ultrasound Guidance;  Surgeon: Miguel Angel Brady MD;  Location: [facility] CATH LAB;  Service: Cardiology;;       Review of Systems:   As documented in primary team H&P    Home Meds:   Prior to Admission medications    Medication Sig Start Date End Date Taking? Authorizing Provider   acyclovir (ZOVIRAX) 800 MG Tab Take 200 mg by mouth 2 (two) times daily.   Yes Historical Provider   amiodarone (PACERONE) 200 MG Tab Take 1 tablet (200 mg total) by mouth once daily. 6/23/22 6/23/23 Yes Benjamin Sandhu MD   artificial tears (ISOPTO TEARS) 0.5 % ophthalmic solution Place 1 drop into both eyes as needed. 1/23/17  Yes Mike Hendrix NP   atorvastatin (LIPITOR) 40 MG tablet Take 1 tablet (40 mg total) by mouth once daily.  Patient taking differently: Take 40 mg by mouth every evening. 4/1/22  Yes Miguelina Weathers MD   clopidogreL (PLAVIX) 75 mg tablet Take 1 tablet by mouth once daily  Patient taking differently: Take by mouth once daily. 4/24/22  Yes Benjamin Sandhu MD   ELIQUIS 5 mg Tab Take 1 tablet by mouth twice daily 10/4/21  Yes Benjamin Sandhu MD   furosemide (LASIX) 20 MG tablet Take 2 tablets (40 mg total) by mouth once daily. 8/22/22 8/17/23 Yes Malini Pagan, NP   gabapentin (NEURONTIN) 100 MG capsule 2 capsules once daily 7/6/22  Yes Shantel Gunter PA-C   melatonin 5 mg Tab Take 10 mg by mouth nightly.   Yes Historical Provider   metoprolol succinate  (TOPROL-XL) 25 MG 24 hr tablet Take 1 tablet by mouth once daily  Patient taking differently: Take by mouth every morning. 1/14/22  Yes Benjamin Sandhu MD   oxybutynin (DITROPAN-XL) 5 MG TR24 Take 1 tablet (5 mg total) by mouth every evening. 8/17/22 8/17/23 Yes Al Handley MD   tamsulosin (FLOMAX) 0.4 mg Cap Take 1 capsule (0.4 mg total) by mouth every evening. 8/3/22  Yes AURELIANO Minor MD   venlafaxine (EFFEXOR) 75 MG tablet Take 1 tablet (75 mg total) by mouth every evening. 8/17/22  Yes Al Handley MD   vitamin D 1000 units Tab Take 1 tablet (1,000 Units total) by mouth once daily.  Patient taking differently: Take 2,000 Units by mouth once daily. 8/5/16  Yes Shivani Espinosa MD   dexAMETHasone (DECADRON) 4 MG Tab 4mg (1 tablet) by mouth twice a day for two days starting the day after chemo.  Repeat after each dose of chemo. 6/8/22   Robert Hargrove MD   diphenhydrAMINE-aluminum-magnesium hydroxide-simethicone-LIDOcaine HCl 2% Swish and spit 15 mLs every 4 (four) hours as needed.    Historical Provider   gly-carb h-poly a-pot hydrox (MUGARD) Soln by Mucous Membrane route.    Historical Provider   hydrocodone-acetaminophen (HYCET) solution 7.5-325 mg/15mL Take 15 mLs by mouth every 8 (eight) hours as needed for Pain. 7/29/22   Robert Hargrove MD   LIDOcaine-prilocaine (EMLA) cream Apply generously to port site 30-60 min prior to chemo and then cover with saran wrap. 6/8/22   Robert Hargrove MD   nitroGLYCERIN (NITROSTAT) 0.4 MG SL tablet Place 1 tablet (0.4 mg total) under the tongue every 5 (five) minutes as needed for Chest pain. 6/29/21 6/29/22  Benjamin Sandhu MD   pantoprazole (PROTONIX) 40 MG tablet Take 1 tablet (40 mg total) by mouth once daily. 6/23/22 6/23/23  Benjamin Sandhu MD   silver sulfADIAZINE 1% (SILVADENE) 1 % cream Apply topically. 7/18/22   Historical Provider   sucralfate (CARAFATE) 100 mg/mL suspension Take 10 mLs (1 g total) by mouth 4 (four) times daily before meals and nightly. 8/17/22    Al Handley MD   albuterol (VENTOLIN HFA) 90 mcg/actuation inhaler Inhale 2 puffs into the lungs every 6 (six) hours as needed for Wheezing. Rescue 5/7/22 5/16/22  Etta Higgins MD   omeprazole (PRILOSEC) 20 MG capsule Take 1 capsule (20 mg total) by mouth daily as needed. 4/17/20 5/7/22  Miguelina Weathers MD   sildenafiL (VIAGRA) 100 MG tablet Take 1 tablet (100 mg total) by mouth daily as needed. 2/2/22 8/17/22  AURELIANO Minor MD   traZODone (DESYREL) 50 MG tablet Take 1 tablet (50 mg total) by mouth nightly as needed for Insomnia.  Patient not taking: Reported on 7/19/2022 4/27/22 8/17/22  Pito Claros DO     Scheduled Meds:    acyclovir  200 mg Oral BID    amiodarone  200 mg Oral Daily    atorvastatin  40 mg Oral QHS    cefTRIAXone (ROCEPHIN) IVPB  1 g Intravenous Q24H    furosemide (LASIX) injection  40 mg Intravenous Daily    gabapentin  200 mg Oral Daily    melatonin  9 mg Oral QHS    metoprolol succinate  25 mg Oral Daily    oxybutynin  5 mg Oral QHS    pantoprazole  40 mg Oral Daily    polyethylene glycol  17 g Oral BID    tamsulosin  1 capsule Oral QHS    vancomycin  125 mg Oral Q12H    vancomycin (VANCOCIN) IVPB  1,000 mg Intravenous Q24H    venlafaxine  75 mg Oral QHS     Continuous Infusions:    heparin (porcine) in D5W 18 Units/kg/hr (08/26/22 0452)     PRN Meds:acetaminophen, aluminum-magnesium hydroxide-simethicone, artificial tears, dextrose 10%, dextrose 10%, glucagon (human recombinant), glucose, glucose, heparin (PORCINE), heparin (PORCINE), HYDROcodone-acetaminophen, naloxone, ondansetron, promethazine, senna-docusate 8.6-50 mg, simethicone, sodium chloride 0.9%, Pharmacy to dose Vancomycin consult **AND** vancomycin - pharmacy to dose  Anticoagulants/Antiplatelets: Heparin drip    Allergies:   Review of patient's allergies indicates:   Allergen Reactions    Ciprofloxacin Rash     Diffuse pruritic morbilliform rash developed 3/15/2017 after dose of cipro;  previously in 2/2017 he had rash/fevers after initiation of cipro    Zosyn [piperacillin-tazobactam] Rash     Diffuse pruritic morbilliform rash developed 3/15/2017.  Then, 430am dose on 3/16 and rash worsened with SOB/tachypnea but no hypoxemia.     Bacitracin Itching and Rash     Violaceous rash in area of topical Tx.      Sedation Hx: have not been any systemic reactions    Labs:  Recent Labs   Lab 08/26/22 0232   INR 1.1       Recent Labs   Lab 08/26/22 0232   WBC 5.57   HGB 7.5*   HCT 22.2*   *   *      Recent Labs   Lab 08/26/22  0231         K 3.5   CL 94*   CO2 38*   BUN 14   CREATININE 1.7*   CALCIUM 9.3   MG 1.6   ALT 12   AST 14   ALBUMIN 2.9*   BILITOT 0.9         Vitals:  Temp: 98.1 °F (36.7 °C) (08/26/22 0818)  Pulse: 71 (08/26/22 0818)  Resp: 16 (08/26/22 0818)  BP: (!) 122/58 (08/26/22 0818)  SpO2: 96 % (08/26/22 0818)     Physical Exam:  ASA: III  Mallampati: II    General: no acute distress  Mental Status: alert and oriented to person, place and time  HEENT: normocephalic, atraumatic  Chest: unlabored breathing  Heart: regular heart rate  Abdomen: nondistended  Extremity: moves all extremities      Plan:  Sedation Plan: local anesthesia.   Patient will undergo port removal with local anesthetic.          Tavia Gillespie MD  Radiology Resident PGY- 2  [facility]

## 2022-08-26 NOTE — PLAN OF CARE
Problem: Adult Inpatient Plan of Care  Goal: Plan of Care Review  Outcome: Ongoing, Progressing  Goal: Patient-Specific Goal (Individualized)  Outcome: Ongoing, Progressing  Goal: Absence of Hospital-Acquired Illness or Injury  Outcome: Ongoing, Progressing  Goal: Optimal Comfort and Wellbeing  Outcome: Ongoing, Progressing  Goal: Readiness for Transition of Care  Outcome: Ongoing, Progressing     Problem: Bariatric Environmental Safety  Goal: Safety Maintained with Care  Outcome: Ongoing, Progressing     Problem: Infection  Goal: Absence of Infection Signs and Symptoms  Outcome: Ongoing, Progressing     Problem: Impaired Wound Healing  Goal: Optimal Wound Healing  Outcome: Ongoing, Progressing     Problem: Adjustment to Illness (Sepsis/Septic Shock)  Goal: Optimal Coping  Outcome: Ongoing, Progressing     Problem: Bleeding (Sepsis/Septic Shock)  Goal: Absence of Bleeding  Outcome: Ongoing, Progressing     Problem: Glycemic Control Impaired (Sepsis/Septic Shock)  Goal: Blood Glucose Level Within Desired Range  Outcome: Ongoing, Progressing     Problem: Infection Progression (Sepsis/Septic Shock)  Goal: Absence of Infection Signs and Symptoms  Outcome: Ongoing, Progressing     Problem: Nutrition Impaired (Sepsis/Septic Shock)  Goal: Optimal Nutrition Intake  Outcome: Ongoing, Progressing     Problem: Fluid and Electrolyte Imbalance (Acute Kidney Injury/Impairment)  Goal: Fluid and Electrolyte Balance  Outcome: Ongoing, Progressing     Problem: Oral Intake Inadequate (Acute Kidney Injury/Impairment)  Goal: Optimal Nutrition Intake  Outcome: Ongoing, Progressing     Problem: Renal Function Impairment (Acute Kidney Injury/Impairment)  Goal: Effective Renal Function  Outcome: Ongoing, Progressing

## 2022-08-26 NOTE — SUBJECTIVE & OBJECTIVE
Interval History: NAEON.  Patient denies any worsening pain or fever. No N/V. Plan for port removal  today    Oncology Treatment Plan:   OP CARBOPLATIN WEEKLY    Medications:  Continuous Infusions:   ceFAZolin 1 g/50ml Dextrose IVPB      heparin (porcine) in D5W Stopped (08/26/22 1050)     Scheduled Meds:   acyclovir  200 mg Oral BID    amiodarone  200 mg Oral Daily    atorvastatin  40 mg Oral QHS    cefTRIAXone (ROCEPHIN) IVPB  1 g Intravenous Q24H    [START ON 8/27/2022] furosemide  40 mg Oral Daily    gabapentin  200 mg Oral Daily    melatonin  9 mg Oral QHS    metoprolol succinate  25 mg Oral Daily    oxybutynin  5 mg Oral QHS    pantoprazole  40 mg Oral Daily    polyethylene glycol  17 g Oral BID    tamsulosin  1 capsule Oral QHS    vancomycin  125 mg Oral Q12H    vancomycin (VANCOCIN) IVPB  1,000 mg Intravenous Q24H    venlafaxine  75 mg Oral QHS     PRN Meds:acetaminophen, aluminum-magnesium hydroxide-simethicone, artificial tears, ceFAZolin 1 g/50ml Dextrose IVPB, dextrose 10%, dextrose 10%, glucagon (human recombinant), glucose, glucose, heparin (PORCINE), heparin (PORCINE), HYDROcodone-acetaminophen, LIDOcaine HCL 10 mg/ml (1%), naloxone, ondansetron, promethazine, senna-docusate 8.6-50 mg, simethicone, sodium chloride 0.9%, Pharmacy to dose Vancomycin consult **AND** vancomycin - pharmacy to dose     Review of Systems   Constitutional:  Positive for chills and fatigue.   HENT:  Negative for sore throat.    Respiratory:  Negative for cough and shortness of breath.    Cardiovascular:  Positive for leg swelling. Negative for chest pain.   Gastrointestinal:  Negative for abdominal pain, constipation, diarrhea, nausea and vomiting.   Genitourinary:  Negative for dysuria.   Musculoskeletal:  Negative for myalgias.   Skin:  Positive for color change.   Neurological:  Positive for weakness. Negative for dizziness and headaches.   Psychiatric/Behavioral:  Negative for confusion.    Objective:     Vital Signs (Most  Recent):  Temp: 98 °F (36.7 °C) (08/26/22 1119)  Pulse: 67 (08/26/22 1119)  Resp: 16 (08/26/22 0818)  BP: (!) 120/53 (08/26/22 1119)  SpO2: 96 % (08/26/22 1119)   Vital Signs (24h Range):  Temp:  [97 °F (36.1 °C)-98.5 °F (36.9 °C)] 98 °F (36.7 °C)  Pulse:  [67-79] 67  Resp:  [16-20] 16  SpO2:  [91 %-98 %] 96 %  BP: (107-134)/(53-60) 120/53     Weight: (!) 143.8 kg (317 lb 0.3 oz)  Body mass index is 41.83 kg/m².  Body surface area is 2.72 meters squared.      Intake/Output Summary (Last 24 hours) at 8/26/2022 1609  Last data filed at 8/26/2022 1225  Gross per 24 hour   Intake 360 ml   Output 2350 ml   Net -1990 ml       Physical Exam  Constitutional:       General: He is not in acute distress.     Appearance: He is ill-appearing.   HENT:      Head: Normocephalic and atraumatic.      Mouth/Throat:      Mouth: Mucous membranes are moist.      Pharynx: Oropharynx is clear.   Eyes:      Extraocular Movements: Extraocular movements intact.      Pupils: Pupils are equal, round, and reactive to light.   Neck:      Comments: Right neck erythema  Cardiovascular:      Rate and Rhythm: Normal rate and regular rhythm.      Pulses: Normal pulses.      Heart sounds: Normal heart sounds.   Pulmonary:      Effort: Pulmonary effort is normal. No respiratory distress.      Breath sounds: Normal breath sounds.   Abdominal:      General: Abdomen is flat. Bowel sounds are normal.      Palpations: Abdomen is soft.   Musculoskeletal:      Cervical back: Neck supple.      Right lower leg: Edema present.      Left lower leg: Edema present.   Skin:     General: Skin is warm and dry.      Capillary Refill: Capillary refill takes less than 2 seconds.      Findings: Erythema (erythema, edema, tenderness, warmth overlying right chest port with two indurated bumps) present.   Neurological:      General: No focal deficit present.      Mental Status: He is alert and oriented to person, place, and time. Mental status is at baseline.   Psychiatric:          Mood and Affect: Mood normal.         Behavior: Behavior normal.         Thought Content: Thought content normal.         Judgment: Judgment normal.       Significant Labs:   Recent Lab Results  (Last 5 results in the past 24 hours)        08/26/22  1107   08/26/22  0232   08/26/22  0231   08/26/22  0009   08/25/22  1710        Albumin     2.9           Alkaline Phosphatase     137           ALT     12           Anion Gap     8           aPTT >150.0  Comment: aPTT therapeutic range = 39-69 seconds  APTT critical result(s) called and verbal readback obtained from   Rachel Green RN by JCShaun 08/26/2022 11:47     36.5  Comment: aPTT therapeutic range = 39-69 seconds     34.9  Comment: aPTT therapeutic range = 39-69 seconds   68.0  Comment: aPTT therapeutic range = 39-69 seconds       AST     14           Baso #   0.03             Basophil %   0.5             BILIRUBIN TOTAL     0.9  Comment: For infants and newborns, interpretation of results should be based  on gestational age, weight and in agreement with clinical  observations.    Premature Infant recommended reference ranges:  Up to 24 hours.............<8.0 mg/dL  Up to 48 hours............<12.0 mg/dL  3-5 days..................<15.0 mg/dL  6-29 days.................<15.0 mg/dL             BUN     14           Calcium     9.3           Chloride     94           CO2     38           Creatinine     1.7           Differential Method   Automated             eGFR     41.5           Eos #   0.1             Eosinophil %   1.4             Glucose     100           Gran # (ANC)   4.3             Gran %   77.8             Hematocrit   22.2             Hemoglobin   7.5             Immature Grans (Abs)   0.01  Comment: Mild elevation in immature granulocytes is non specific and   can be seen in a variety of conditions including stress response,   acute inflammation, trauma and pregnancy. Correlation with other   laboratory and clinical findings is essential.                Immature Granulocytes   0.2             INR   1.1  Comment: Coumadin Therapy:  2.0 - 3.0 for INR for all indicators except mechanical heart valves  and antiphospholipid syndromes which should use 2.5 - 3.5.               Lymph #   0.5             Lymph %   8.6             Magnesium     1.6           MCH   36.4             MCHC   33.8             MCV   108             Mono #   0.6             Mono %   11.5             MPV   10.6             nRBC   0             Phosphorus     3.1           Platelets   142             Potassium     3.5           PROTEIN TOTAL     6.2           Protime   11.4             RBC   2.06             RDW   17.5             Sodium     140           Vancomycin, Random     18.5           WBC   5.57                                    Diagnostic Results:  I have reviewed all pertinent imaging results/findings within the past 24 hours.

## 2022-08-26 NOTE — SUBJECTIVE & OBJECTIVE
Subjective:     Principal Problem:Infection of venous access port    Interval History: *    Medications:  Continuous Infusions:   heparin (porcine) in D5W Stopped (08/26/22 1050)     Scheduled Meds:   acyclovir  200 mg Oral BID    amiodarone  200 mg Oral Daily    atorvastatin  40 mg Oral QHS    cefTRIAXone (ROCEPHIN) IVPB  1 g Intravenous Q24H    [START ON 8/27/2022] furosemide  40 mg Oral Daily    gabapentin  200 mg Oral Daily    melatonin  9 mg Oral QHS    metoprolol succinate  25 mg Oral Daily    oxybutynin  5 mg Oral QHS    pantoprazole  40 mg Oral Daily    polyethylene glycol  17 g Oral BID    tamsulosin  1 capsule Oral QHS    vancomycin  125 mg Oral Q12H    vancomycin (VANCOCIN) IVPB  1,000 mg Intravenous Q24H    venlafaxine  75 mg Oral QHS     PRN Meds:acetaminophen, aluminum-magnesium hydroxide-simethicone, artificial tears, dextrose 10%, dextrose 10%, glucagon (human recombinant), glucose, glucose, heparin (PORCINE), heparin (PORCINE), HYDROcodone-acetaminophen, naloxone, ondansetron, promethazine, senna-docusate 8.6-50 mg, simethicone, sodium chloride 0.9%, Pharmacy to dose Vancomycin consult **AND** vancomycin - pharmacy to dose    Review of Systems   Constitutional:  Negative for fever and chills.   Skin:  Positive for rash.   Objective:     Vital Signs (Most Recent):  Temp: 98 °F (36.7 °C) (08/26/22 1119)  Pulse: 67 (08/26/22 1119)  Resp: 16 (08/26/22 0818)  BP: (!) 120/53 (08/26/22 1119)  SpO2: 96 % (08/26/22 1119)   Vital Signs (24h Range):  Temp:  [97 °F (36.1 °C)-98.5 °F (36.9 °C)] 98 °F (36.7 °C)  Pulse:  [67-79] 67  Resp:  [16-20] 16  SpO2:  [91 %-99 %] 96 %  BP: (107-134)/(53-60) 120/53     Weight: (!) 143.8 kg (317 lb 0.3 oz)  Body mass index is 41.83 kg/m².    Physical Exam   Constitutional: He appears well-developed and well-nourished.   Neurological: He is alert and oriented to person, place, and time.   Psychiatric: He has a normal mood and affect.   Skin:   Areas Examined (abnormalities  noted in diagram):   Head / Face Inspection Performed  Neck Inspection Performed  Chest / Axilla Inspection Performed  Abdomen Inspection Performed  Annular erythematous plaque and 2 erythematous nodules overlying right chest port site   Faint erythematous patches to right neck and shoulder with some overlying serous crust              Significant Labs: Blood Culture: No results for input(s): LABBLOO in the last 48 hours.  CBC:   Recent Labs   Lab 08/25/22  0406 08/26/22  0232   WBC 5.97 5.57   HGB 7.2* 7.5*   HCT 21.4* 22.2*   * 142*     CMP:   Recent Labs   Lab 08/25/22  0406 08/26/22  0231    140   K 3.3* 3.5   CL 96 94*   CO2 37* 38*   GLU 98 100   BUN 16 14   CREATININE 1.8* 1.7*   CALCIUM 9.0 9.3   PROT 6.0 6.2   ALBUMIN 2.7* 2.9*   BILITOT 1.0 0.9   ALKPHOS 124 137*   AST 14 14   ALT 15 12   ANIONGAP 8 8       Significant Imaging: I have reviewed all pertinent imaging results/findings within the past 24 hours.

## 2022-08-26 NOTE — NURSING
Infected right chest port removal complete. Pt tolerated well. VSS. No signs or symptoms of distress noted  Pt will be transferred to MPU bed after site cleansed and dressing on.  Report to MPU RN and called to floor RN..

## 2022-08-26 NOTE — PROGRESS NOTES
Pharmacokinetic Assessment Follow Up: IV Vancomycin    Vancomycin serum concentration assessment(s)/plan:    - Random level came back at 18.5 mcg/mL indicating it is appropriate to re-dose  - Given patient's RF remains stable and close to baseline, will schedule a regimen   - Initiate intravenous vancomycin 1000mg Q24hr  - Goal 15-20mcg/mL  - Trough due on 8/28 at ~08:00      Drug levels (last 3 results):  Recent Labs   Lab Result Units 08/24/22  2217 08/26/22  0231   Vancomycin, Random ug/mL 13.7 18.5       Pharmacy will continue to follow and monitor vancomycin.    Please contact pharmacy at extension [phone] for questions regarding this assessment.    Thank you for the consult,   Marge Ryan       Patient brief summary:  Janusz Montgomery Jr. is a 77 y.o. male initiated on antimicrobial therapy with IV Vancomycin for treatment of bacteremia    Drug Allergies:   Review of patient's allergies indicates:   Allergen Reactions    Ciprofloxacin Rash     Diffuse pruritic morbilliform rash developed 3/15/2017 after dose of cipro; previously in 2/2017 he had rash/fevers after initiation of cipro    Zosyn [piperacillin-tazobactam] Rash     Diffuse pruritic morbilliform rash developed 3/15/2017.  Then, 430am dose on 3/16 and rash worsened with SOB/tachypnea but no hypoxemia.     Bacitracin Itching and Rash     Violaceous rash in area of topical Tx.        Actual Body Weight:   143.8kg    Renal Function:   Estimated Creatinine Clearance: 56 mL/min (A) (based on SCr of 1.7 mg/dL (H)).,     Dialysis Method (if applicable):  N/A    CBC (last 72 hours):  Recent Labs   Lab Result Units 08/23/22  2044 08/24/22  0303 08/25/22  0406 08/26/22  0232   WBC K/uL 6.84 6.72 5.97 5.57   Hemoglobin g/dL 8.1* 7.6* 7.2* 7.5*   Hematocrit % 23.3* 22.7* 21.4* 22.2*   Platelets K/uL 145* 148* 142* 142*   Gran % % 77.7* 81.2* 77.0* 77.8*   Lymph % % 9.1* 8.3* 11.7* 8.6*   Mono % % 11.0 8.3 8.5 11.5   Eosinophil % % 1.3 1.5 2.0 1.4   Basophil % %  0.6 0.4 0.5 0.5   Differential Method  Automated Automated Automated Automated       Metabolic Panel (last 72 hours):  Recent Labs   Lab Result Units 08/23/22  2044 08/24/22  0303 08/25/22  0406 08/26/22  0231   Sodium mmol/L 142 141 141 140   Potassium mmol/L 4.1 3.8 3.3* 3.5   Chloride mmol/L 99 98 96 94*   CO2 mmol/L 35* 36* 37* 38*   Glucose mg/dL 98 102 98 100   BUN mg/dL 13 14 16 14   Creatinine mg/dL 1.8* 1.9* 1.8* 1.7*   Albumin g/dL 2.8* 2.8* 2.7* 2.9*   Total Bilirubin mg/dL 0.9 1.0 1.0 0.9   Alkaline Phosphatase U/L 134 139* 124 137*   AST U/L 33 18 14 14   ALT U/L 18 16 15 12   Magnesium mg/dL  --  1.5* 1.6 1.6   Phosphorus mg/dL  --  2.9 3.2 3.1       Vancomycin Administrations:  vancomycin given in the last 96 hours                   vancomycin 125 mg/5 mL oral solution 125 mg (mg) 125 mg Given 08/26/22 0839     125 mg Given 08/25/22 2128     125 mg Given  1129    vancomycin 1.25 g in dextrose 5% 250 mL IVPB (ready to mix) (mg) 1,250 mg New Bag 08/25/22 0449    vancomycin 2 g in dextrose 5 % 500 mL IVPB (mg) 2,000 mg New Bag 08/23/22 2358                Microbiologic Results:  Microbiology Results (last 7 days)     Procedure Component Value Units Date/Time    Blood culture [[id number]] Collected: 08/24/22 0113    Order Status: Completed Specimen: Blood from Peripheral, Lower Arm, Right Updated: 08/26/22 0613     Blood Culture, Routine No Growth to date      No Growth to date      No Growth to date    Blood culture [[id number]] Collected: 08/24/22 0112    Order Status: Completed Specimen: Blood from Peripheral, Hand, Right Updated: 08/26/22 0613     Blood Culture, Routine No Growth to date      No Growth to date      No Growth to date

## 2022-08-26 NOTE — ASSESSMENT & PLAN NOTE
The acute nature of the presentation of the clinical findings are not consistent with malignancy; skin cancer does not acutely present in 4 days  Findings on exam and ultrasound support infection   Patient is having the port removed with IR and they are performing culture swabs   Antibiotics per ID  If lesion does not improve with appropriate antibiotics per culture findings/sensitivities, then can re-assess need for biopsy for tissue culture

## 2022-08-26 NOTE — PLAN OF CARE
Patient continues on 4L of oxygen via NC. Denies pin. Patient continues on IV abx.   Port removed today. Patient stable, no acute changes.   Heparin gtt put on hold for procedure- awaiting PTT results to restart.     Problem: Adult Inpatient Plan of Care  Goal: Plan of Care Review  Outcome: Ongoing, Progressing  Goal: Patient-Specific Goal (Individualized)  Outcome: Ongoing, Progressing  Goal: Absence of Hospital-Acquired Illness or Injury  Outcome: Ongoing, Progressing  Goal: Optimal Comfort and Wellbeing  Outcome: Ongoing, Progressing  Goal: Readiness for Transition of Care  Outcome: Ongoing, Progressing     Problem: Bariatric Environmental Safety  Goal: Safety Maintained with Care  Outcome: Ongoing, Progressing     Problem: Infection  Goal: Absence of Infection Signs and Symptoms  Outcome: Ongoing, Progressing     Problem: Impaired Wound Healing  Goal: Optimal Wound Healing  Outcome: Ongoing, Progressing     Problem: Adjustment to Illness (Sepsis/Septic Shock)  Goal: Optimal Coping  Outcome: Ongoing, Progressing     Problem: Bleeding (Sepsis/Septic Shock)  Goal: Absence of Bleeding  Outcome: Ongoing, Progressing     Problem: Glycemic Control Impaired (Sepsis/Septic Shock)  Goal: Blood Glucose Level Within Desired Range  Outcome: Ongoing, Progressing     Problem: Infection Progression (Sepsis/Septic Shock)  Goal: Absence of Infection Signs and Symptoms  Outcome: Ongoing, Progressing     Problem: Nutrition Impaired (Sepsis/Septic Shock)  Goal: Optimal Nutrition Intake  Outcome: Ongoing, Progressing     Problem: Fluid and Electrolyte Imbalance (Acute Kidney Injury/Impairment)  Goal: Fluid and Electrolyte Balance  Outcome: Ongoing, Progressing     Problem: Oral Intake Inadequate (Acute Kidney Injury/Impairment)  Goal: Optimal Nutrition Intake  Outcome: Ongoing, Progressing     Problem: Renal Function Impairment (Acute Kidney Injury/Impairment)  Goal: Effective Renal Function  Outcome: Ongoing, Progressing

## 2022-08-26 NOTE — NURSING
Patient monitored in MPU s/p port removal for procedural relative hypotension. Patient received 1L fluid bolus prior to arrival in MPU. BP has been at or above patient's baseline throughout recovery. Stable for transfer back to [facility].

## 2022-08-26 NOTE — ASSESSMENT & PLAN NOTE
-- Worsening edema, seems to have progressed more since fluid resuscitation on recent hospitalization.   -- Requiring recent increases in Lasix 20 mg from PRN to daily to 40 mg daily  -- BNP upper level normal. Although likely falsely low in setting of obesity  -- Volume overloaded on exam  -- Lasix 40 mg IV transitioned to 40mg PO  -- Low sodium diet, which he has some non-adherence to per recent cardiology visit  -- Follows closely with cardiology. Scheduled to see 8/29

## 2022-08-27 NOTE — SUBJECTIVE & OBJECTIVE
Interval History: NAEON. Patient is doing well and continues to be  afebrile. No bleeding, fever, chills, N/V, HA    Oncology Treatment Plan:   OP CARBOPLATIN WEEKLY    Medications:  Continuous Infusions:   heparin (porcine) in D5W 14.2 Units/kg/hr (08/27/22 1100)     Scheduled Meds:   acyclovir  200 mg Oral BID    amiodarone  200 mg Oral Daily    atorvastatin  40 mg Oral QHS    cefTRIAXone (ROCEPHIN) IVPB  1 g Intravenous Q24H    furosemide  40 mg Oral Daily    gabapentin  200 mg Oral Daily    melatonin  9 mg Oral QHS    metoprolol succinate  25 mg Oral Daily    oxybutynin  5 mg Oral QHS    pantoprazole  40 mg Oral Daily    polyethylene glycol  17 g Oral BID    tamsulosin  1 capsule Oral QHS    vancomycin  125 mg Oral Q12H    vancomycin (VANCOCIN) IVPB  1,000 mg Intravenous Q24H    venlafaxine  75 mg Oral QHS     PRN Meds:acetaminophen, aluminum-magnesium hydroxide-simethicone, artificial tears, dextrose 10%, dextrose 10%, glucagon (human recombinant), glucose, glucose, heparin (PORCINE), heparin (PORCINE), HYDROcodone-acetaminophen, naloxone, ondansetron, promethazine, senna-docusate 8.6-50 mg, simethicone, sodium chloride 0.9%, Pharmacy to dose Vancomycin consult **AND** vancomycin - pharmacy to dose     Review of Systems   Constitutional:  Positive for chills and fatigue.   HENT:  Negative for sore throat.    Respiratory:  Negative for cough and shortness of breath.    Cardiovascular:  Positive for leg swelling. Negative for chest pain.   Gastrointestinal:  Negative for abdominal pain, constipation, diarrhea, nausea and vomiting.   Genitourinary:  Negative for dysuria.   Musculoskeletal:  Negative for myalgias.   Skin:  Positive for color change.   Neurological:  Positive for weakness. Negative for dizziness and headaches.   Psychiatric/Behavioral:  Negative for confusion.    Objective:     Vital Signs (Most Recent):  Temp: 98.3 °F (36.8 °C) (08/27/22 1543)  Pulse: 79 (08/27/22 1543)  Resp: 17 (08/27/22  1543)  BP: (!) 120/52 (08/27/22 1543)  SpO2: 100 % (08/27/22 1543)   Vital Signs (24h Range):  Temp:  [97.7 °F (36.5 °C)-99 °F (37.2 °C)] 98.3 °F (36.8 °C)  Pulse:  [69-80] 79  Resp:  [15-20] 17  SpO2:  [96 %-100 %] 100 %  BP: ()/(47-72) 120/52     Weight: (!) 143.8 kg (317 lb 0.3 oz)  Body mass index is 41.83 kg/m².  Body surface area is 2.72 meters squared.      Intake/Output Summary (Last 24 hours) at 8/27/2022 1548  Last data filed at 8/27/2022 0500  Gross per 24 hour   Intake 1240 ml   Output 750 ml   Net 490 ml       Physical Exam  Constitutional:       General: He is not in acute distress.     Appearance: He is not ill-appearing.   HENT:      Head: Normocephalic and atraumatic.      Mouth/Throat:      Mouth: Mucous membranes are moist.      Pharynx: Oropharynx is clear.   Eyes:      Extraocular Movements: Extraocular movements intact.      Pupils: Pupils are equal, round, and reactive to light.   Neck:      Comments: Right neck erythema  Cardiovascular:      Rate and Rhythm: Normal rate and regular rhythm.      Pulses: Normal pulses.      Heart sounds: Normal heart sounds.   Pulmonary:      Effort: Pulmonary effort is normal. No respiratory distress.      Breath sounds: Normal breath sounds.   Abdominal:      General: Abdomen is flat. Bowel sounds are normal.      Palpations: Abdomen is soft.   Musculoskeletal:      Cervical back: Neck supple.      Right lower leg: Edema present.      Left lower leg: Edema present.   Skin:     General: Skin is warm and dry.      Capillary Refill: Capillary refill takes less than 2 seconds.      Comments: Incision from  port removal that is CDI with  improvement in erythema. No active bleeding.    Neurological:      General: No focal deficit present.      Mental Status: He is alert and oriented to person, place, and time. Mental status is at baseline.   Psychiatric:         Mood and Affect: Mood normal.         Behavior: Behavior normal.         Thought Content: Thought  content normal.         Judgment: Judgment normal.       Significant Labs:   Recent Lab Results         08/27/22  0628   08/27/22  0355   08/26/22  1847   08/26/22  1603        Aerobic Culture - Cath tip       No growth  [P]       aPTT 141.8  Comment: aPTT therapeutic range = 39-69 seconds     23.1  Comment: aPTT therapeutic range = 39-69 seconds         Baso #   0.03           Basophil %   0.5           Differential Method   Automated           Eos #   0.1           Eosinophil %   1.6           Gran # (ANC)   5.0           Gran %   77.5           Hematocrit   20.9           Hemoglobin   7.2           Immature Grans (Abs)   0.02  Comment: Mild elevation in immature granulocytes is non specific and   can be seen in a variety of conditions including stress response,   acute inflammation, trauma and pregnancy. Correlation with other   laboratory and clinical findings is essential.             Immature Granulocytes   0.3           Lymph #   0.6           Lymph %   9.5           MCH   36.0           MCHC   34.4           MCV   105           Mono #   0.7           Mono %   10.6           MPV   10.4           nRBC   0           Platelets   141           RBC   2.00           RDW   17.1           WBC   6.40                    [P] - Preliminary Result               Diagnostic Results:  I have reviewed all pertinent imaging results/findings within the past 24 hours.

## 2022-08-27 NOTE — ASSESSMENT & PLAN NOTE
-Hold heparin today.   - Plan to start Lovenox 8/28.   - Check anti Xa after  3-4 doses of Lovenox (Afternoon of 8/29 or 30th)

## 2022-08-27 NOTE — PROGRESS NOTES
[facility] - Oncology ([facility])  Hematology/Oncology  Progress Note    Patient Name: Janusz Montgomery Jr.  Admission Date: 8/23/2022  Hospital Length of Stay: 3 days  Code Status: Full Code     Subjective:     HPI:  Janusz Montgomery is a 77 y.o. 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 Follows with Dr. Hargrove. Completed chemoradiation therapy on 8/1/22. Reports some redness to right neck which he attributes to radiation. However, has since developed redness, swelling, and tenderness overlying right chest port concerning for possible infection over last 2-3 days. Denies fever or chills. Increasing edema in extremities worse in RUE over last 4-5 days and generalized weakness causing difficulty ambulating. Recently hospitalized with dehydration, NAT, weakness, C dif. Completed vancomycin PO 7 days and diarrhea resolved. Rehydrated and concern that he then became overloaded. Cr uptrending on discharge. Recently saw nephro 8/22 for hospital f/u, noted worsening Cr baseline and increased Lasix to 40 mg daily.      Interval History: NAEON. Patient is doing well and continues to be  afebrile. No bleeding, fever, chills, N/V, HA    Oncology Treatment Plan:   OP CARBOPLATIN WEEKLY    Medications:  Continuous Infusions:   heparin (porcine) in D5W 14.2 Units/kg/hr (08/27/22 1100)     Scheduled Meds:   acyclovir  200 mg Oral BID    amiodarone  200 mg Oral Daily    atorvastatin  40 mg Oral QHS    cefTRIAXone (ROCEPHIN) IVPB  1 g Intravenous Q24H    furosemide  40 mg Oral Daily    gabapentin  200 mg Oral Daily    melatonin  9 mg Oral QHS    metoprolol succinate  25 mg Oral Daily    oxybutynin  5 mg Oral QHS    pantoprazole  40 mg Oral Daily    polyethylene glycol  17 g Oral BID    tamsulosin  1  capsule Oral QHS    vancomycin  125 mg Oral Q12H    vancomycin (VANCOCIN) IVPB  1,000 mg Intravenous Q24H    venlafaxine  75 mg Oral QHS     PRN Meds:acetaminophen, aluminum-magnesium hydroxide-simethicone, artificial tears, dextrose 10%, dextrose 10%, glucagon (human recombinant), glucose, glucose, heparin (PORCINE), heparin (PORCINE), HYDROcodone-acetaminophen, naloxone, ondansetron, promethazine, senna-docusate 8.6-50 mg, simethicone, sodium chloride 0.9%, Pharmacy to dose Vancomycin consult **AND** vancomycin - pharmacy to dose     Review of Systems   Constitutional:  Positive for chills and fatigue.   HENT:  Negative for sore throat.    Respiratory:  Negative for cough and shortness of breath.    Cardiovascular:  Positive for leg swelling. Negative for chest pain.   Gastrointestinal:  Negative for abdominal pain, constipation, diarrhea, nausea and vomiting.   Genitourinary:  Negative for dysuria.   Musculoskeletal:  Negative for myalgias.   Skin:  Positive for color change.   Neurological:  Positive for weakness. Negative for dizziness and headaches.   Psychiatric/Behavioral:  Negative for confusion.    Objective:     Vital Signs (Most Recent):  Temp: 98.3 °F (36.8 °C) (08/27/22 1543)  Pulse: 79 (08/27/22 1543)  Resp: 17 (08/27/22 1543)  BP: (!) 120/52 (08/27/22 1543)  SpO2: 100 % (08/27/22 1543)   Vital Signs (24h Range):  Temp:  [97.7 °F (36.5 °C)-99 °F (37.2 °C)] 98.3 °F (36.8 °C)  Pulse:  [69-80] 79  Resp:  [15-20] 17  SpO2:  [96 %-100 %] 100 %  BP: ()/(47-72) 120/52     Weight: (!) 143.8 kg (317 lb 0.3 oz)  Body mass index is 41.83 kg/m².  Body surface area is 2.72 meters squared.      Intake/Output Summary (Last 24 hours) at 8/27/2022 1548  Last data filed at 8/27/2022 0500  Gross per 24 hour   Intake 1240 ml   Output 750 ml   Net 490 ml       Physical Exam  Constitutional:       General: He is not in acute distress.     Appearance: He is not ill-appearing.   HENT:      Head: Normocephalic and  atraumatic.      Mouth/Throat:      Mouth: Mucous membranes are moist.      Pharynx: Oropharynx is clear.   Eyes:      Extraocular Movements: Extraocular movements intact.      Pupils: Pupils are equal, round, and reactive to light.   Neck:      Comments: Right neck erythema  Cardiovascular:      Rate and Rhythm: Normal rate and regular rhythm.      Pulses: Normal pulses.      Heart sounds: Normal heart sounds.   Pulmonary:      Effort: Pulmonary effort is normal. No respiratory distress.      Breath sounds: Normal breath sounds.   Abdominal:      General: Abdomen is flat. Bowel sounds are normal.      Palpations: Abdomen is soft.   Musculoskeletal:      Cervical back: Neck supple.      Right lower leg: Edema present.      Left lower leg: Edema present.   Skin:     General: Skin is warm and dry.      Capillary Refill: Capillary refill takes less than 2 seconds.      Comments: Incision from  port removal that is CDI with  improvement in erythema. No active bleeding.    Neurological:      General: No focal deficit present.      Mental Status: He is alert and oriented to person, place, and time. Mental status is at baseline.   Psychiatric:         Mood and Affect: Mood normal.         Behavior: Behavior normal.         Thought Content: Thought content normal.         Judgment: Judgment normal.       Significant Labs:   Recent Lab Results         08/27/22  0628   08/27/22  0355   08/26/22  1847   08/26/22  1603        Aerobic Culture - Cath tip       No growth  [P]       aPTT 141.8  Comment: aPTT therapeutic range = 39-69 seconds     23.1  Comment: aPTT therapeutic range = 39-69 seconds         Baso #   0.03           Basophil %   0.5           Differential Method   Automated           Eos #   0.1           Eosinophil %   1.6           Gran # (ANC)   5.0           Gran %   77.5           Hematocrit   20.9           Hemoglobin   7.2           Immature Grans (Abs)   0.02  Comment: Mild elevation in immature granulocytes  is non specific and   can be seen in a variety of conditions including stress response,   acute inflammation, trauma and pregnancy. Correlation with other   laboratory and clinical findings is essential.             Immature Granulocytes   0.3           Lymph #   0.6           Lymph %   9.5           MCH   36.0           MCHC   34.4           MCV   105           Mono #   0.7           Mono %   10.6           MPV   10.4           nRBC   0           Platelets   141           RBC   2.00           RDW   17.1           WBC   6.40                    [P] - Preliminary Result               Diagnostic Results:  I have reviewed all pertinent imaging results/findings within the past 24 hours.    Assessment/Plan:     * Infection of venous access port  -- Suspect likely infection of right chest venous access port  -- No evidence of sepsis  -- Hemodynamically stable  -- Slightly elevated inflammatory markers  -- Two small indurated bumps concerning for possible abscesses    PLAN:  -- Continue vancomycin and ceftriaxone per ID recs  -- F/u Blood, tissue, port Cx   -- F/u tissue pathology  -- ID on board  -- Port removed by IR 8/26     Acute DVT (deep venous thrombosis)  -Hold heparin today.   - Plan to start Lovenox 8/28.   - Check anti Xa after  3-4 doses of Lovenox (Afternoon of 8/29 or 30th)    Acute decompensated heart failure  -- Worsening edema, seems to have progressed more since fluid resuscitation on recent hospitalization.   -- Requiring recent increases in Lasix 20 mg from PRN to daily to 40 mg daily  -- BNP upper level normal. Although likely falsely low in setting of obesity  -- Volume overloaded on exam  -- Lasix 40 mg IV transitioned to 40mg PO  -- Low sodium diet, which he has some non-adherence to per recent cardiology visit  -- Follows closely with cardiology. Scheduled to see 8/29      Chronic respiratory failure with hypoxia  Patient with Hypoxic Respiratory failure which is Chronic.  he is on home oxygen at 4  LPM. Supplemental oxygen was provided and noted-  .   Signs/symptoms of respiratory failure include- lethargy. Contributing diagnoses includes - CHF, restrictive lung disease. Labs and images were reviewed. Patient Has not had a recent ABG. Will treat underlying causes and adjust management of respiratory failure as follows- Continue home 4L O2    Head and neck cancer  -- Squamous cell carcinoma of the right head and neck s/p radical neck dissection and chemoradiation with carboplatin, completed 8/1/22. Follows with Dr. Hargrove.     Paroxysmal atrial fibrillation   -- Continue home amiodarone 200 mg daily, metoprolol succinate 25 mg daily  -- F/u with his cardiologist (Dr. Sandhu) as scheduled  -- Eliquis 5mg transitioned  To  Heparin gtt for DVT on Eliquis              Ilan Cueva DO  Hematology/Oncology  [facility] - Oncology ([facility])

## 2022-08-27 NOTE — ASSESSMENT & PLAN NOTE
-- Suspect likely infection of right chest venous access port  -- No evidence of sepsis  -- Hemodynamically stable  -- Slightly elevated inflammatory markers  -- Two small indurated bumps concerning for possible abscesses    PLAN:  -- Continue vancomycin and ceftriaxone per ID recs  -- F/u Blood, tissue, port Cx   -- F/u tissue pathology  -- ID on board  -- Port removed by IR 8/26

## 2022-08-27 NOTE — PLAN OF CARE
Problem: Adult Inpatient Plan of Care  Goal: Plan of Care Review  Outcome: Ongoing, Not Progressing  Goal: Patient-Specific Goal (Individualized)  Outcome: Ongoing, Not Progressing  Goal: Absence of Hospital-Acquired Illness or Injury  Outcome: Ongoing, Not Progressing  Goal: Optimal Comfort and Wellbeing  Outcome: Ongoing, Not Progressing  Goal: Readiness for Transition of Care  Outcome: Ongoing, Not Progressing     Problem: Bariatric Environmental Safety  Goal: Safety Maintained with Care  Outcome: Ongoing, Not Progressing     Problem: Infection  Goal: Absence of Infection Signs and Symptoms  Outcome: Ongoing, Not Progressing     Problem: Impaired Wound Healing  Goal: Optimal Wound Healing  Outcome: Ongoing, Not Progressing     Problem: Adjustment to Illness (Sepsis/Septic Shock)  Goal: Optimal Coping  Outcome: Ongoing, Not Progressing     Problem: Bleeding (Sepsis/Septic Shock)  Goal: Absence of Bleeding  Outcome: Ongoing, Not Progressing     Problem: Glycemic Control Impaired (Sepsis/Septic Shock)  Goal: Blood Glucose Level Within Desired Range  Outcome: Ongoing, Not Progressing     Problem: Infection Progression (Sepsis/Septic Shock)  Goal: Absence of Infection Signs and Symptoms  Outcome: Ongoing, Not Progressing     Problem: Nutrition Impaired (Sepsis/Septic Shock)  Goal: Optimal Nutrition Intake  Outcome: Ongoing, Not Progressing     Problem: Fluid and Electrolyte Imbalance (Acute Kidney Injury/Impairment)  Goal: Fluid and Electrolyte Balance  Outcome: Ongoing, Not Progressing     Problem: Oral Intake Inadequate (Acute Kidney Injury/Impairment)  Goal: Optimal Nutrition Intake  Outcome: Ongoing, Not Progressing     Problem: Renal Function Impairment (Acute Kidney Injury/Impairment)  Goal: Effective Renal Function  Outcome: Ongoing, Not Progressing     Problem: Fall Injury Risk  Goal: Absence of Fall and Fall-Related Injury  Outcome: Ongoing, Not Progressing

## 2022-08-27 NOTE — PROGRESS NOTES
[facility] Infectious Diseases Progress Note    Assessment/Plan:     Janusz Montgomery 77M hx squamous cell H/N cancer s/p radical neck dissection and chemoradiation who presents w/ possible port infection. R chest port w/ overlying erythema and new nodular skin lesions present. Nodular lesions are atypical, and concerning for malignancy vs. Atypical infection (nocardia, fungal, mycobacterial). Blood cx NGTD. Chest US w/ fluid collection surrounding port. Pt on vanc and ceftriaxone. 8/24 RUE US DVT. 8/26 CT neck: Rounded soft tissue attenuation nodules are seen around the patient's right chest port,    Infection of venous access port  - 8/26 SP chest port removal (cultures pending)  - tissue biopsy of nodular lesion (path pending)   - 8/24 BCx NG so far    - continue IV vanc and ceftriaxone for now  - C. Diff Ag+ -- 8/25 restarted PO vancomycin 125mg BID     Louie Fuentes MD  [facility] Infectious Diseases  Cell: [phone]    Thank you for allowing us to participate in the care of this patient. We will continue to follow along. Case has been discussed with consult staff, who is in agreement with assessment and plan. ID will continue to follow.    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 Completed chemoradiation therapy on 8/1/22. Pt endorses some erythema to R side of neck that he attributed to radiation treatments, however states this progressed to involve the area overlying his port, with sudden development of tender nodular lesion on chest. He denies any fevers at home. Also endorses increased redness of R>L lower extremities w/ increased swelling and tenderness. Of note, recently completed a 7 day course of PO vancomycin for c. Diff infection, not currently complaining of diarrhea. Blood cultures obtained,  and patient has been started on vanc and ceftriaxone for possible port infection. US of chest shows fluid collection surrounding port. ID consulted for assistance.     Interval events: Sp R- chest port removal, no issues overnight, denies f/c/ns/nv     Objective:   Last 24 Hour Vital Signs:  BP  Min: 99/47  Max: 163/67  Temp  Av.1 °F (36.7 °C)  Min: 97.7 °F (36.5 °C)  Max: 99 °F (37.2 °C)  Pulse  Av.5  Min: 67  Max: 80  Resp  Av  Min: 15  Max: 20  SpO2  Av.5 %  Min: 96 %  Max: 100 %  I/O last 3 completed shifts:  In: 1360 [P.O.:360; I.V.:1000]  Out: 2800 [Urine:2800]    Physical Exam  Constitutional:       General: He is not in acute distress.     Appearance: Normal appearance. He is well-developed. He is not ill-appearing or diaphoretic.   HENT:      Head: Normocephalic and atraumatic.      Right Ear: External ear normal.      Left Ear: External ear normal.      Nose: Nose normal.   Eyes:      General: No scleral icterus.        Right eye: No discharge.         Left eye: No discharge.      Extraocular Movements: Extraocular movements intact.      Conjunctiva/sclera: Conjunctivae normal.   Neck:      Comments: R neck erythema   Pulmonary:      Effort: Pulmonary effort is normal. No respiratory distress.      Breath sounds: No stridor.   Musculoskeletal:      Right lower leg: Edema present.      Left lower leg: Edema present.      Comments: B/l LE erythema R>L, warmth and tenderness   Skin:     General: Skin is dry.      Coloration: Skin is not jaundiced or pale.      Findings: Erythema and lesion present.      Comments: Erythema of R chest overlying port  No fluctuance  Atypical appearing nodular skin lesion present  Neurological:      General: No focal deficit present.      Mental Status: He is alert and oriented to person, place, and time. Mental status is at baseline.   Psychiatric:         Mood and Affect: Mood normal.         Behavior: Behavior normal.         Thought Content: Thought  content normal.         Judgment: Judgment normal.     Laboratory:  Laboratory Data Reviewed: yes  Pertinent Findings:  Recent Labs   Lab 08/24/22  0303 08/25/22  0406 08/26/22  0231 08/26/22  0232 08/27/22  0355   WBC 6.72 5.97  --  5.57 6.40   HGB 7.6* 7.2*  --  7.5* 7.2*   HCT 22.7* 21.4*  --  22.2* 20.9*   * 142*  --  142* 141*   * 104*  --  108* 105*   RDW 17.5* 17.3*  --  17.5* 17.1*    141 140  --   --    K 3.8 3.3* 3.5  --   --    CL 98 96 94*  --   --    CO2 36* 37* 38*  --   --    BUN 14 16 14  --   --    CREATININE 1.9* 1.8* 1.7*  --   --     98 100  --   --    PROT 6.0 6.0 6.2  --   --    ALBUMIN 2.8* 2.7* 2.9*  --   --    BILITOT 1.0 1.0 0.9  --   --    AST 18 14 14  --   --    ALKPHOS 139* 124 137*  --   --    ALT 16 15 12  --   --          Microbiology Data:  Microbiology Results (last 7 days)       Procedure Component Value Units Date/Time    IV catheter culture [[id number]] Collected: 08/26/22 1603    Order Status: Completed Specimen: Abscess Updated: 08/27/22 0721     Aerobic Culture - Cath tip No growth    Blood culture [[id number]] Collected: 08/24/22 0113    Order Status: Completed Specimen: Blood from Peripheral, Lower Arm, Right Updated: 08/27/22 0612     Blood Culture, Routine No Growth to date      No Growth to date      No Growth to date      No Growth to date    Blood culture [[id number]] Collected: 08/24/22 0112    Order Status: Completed Specimen: Blood from Peripheral, Hand, Right Updated: 08/27/22 0612     Blood Culture, Routine No Growth to date      No Growth to date      No Growth to date      No Growth to date    Tissue culture [[id number]] Collected: 08/26/22 1609    Order Status: Canceled Specimen: Tissue from Chest, Right     Direct AFB stain [[id number]] Collected: 08/26/22 1607    Order Status: Canceled Specimen: Abscess from Chest, Right     Modified Acid Fast Stain For Nocardia [[id number]] Collected: 08/26/22 1607    Order Status: Canceled Specimen:  Abscess from Chest, Right     AFB Culture & Smear [[id number]] Collected: 08/26/22 1607    Order Status: Canceled Specimen: Abscess from Chest, Right     Culture, Anaerobe [[id number]] Collected: 08/26/22 1607    Order Status: Canceled Specimen: Abscess from Chest, Right     Fungus culture [[id number]] Collected: 08/26/22 1607    Order Status: Canceled Specimen: Abscess from Chest, Right     Tissue culture [[id number]]     Order Status: Canceled Specimen: Tissue from Chest, Right     Tissue culture [[id number]] Collected: 08/26/22 1603    Order Status: Canceled Specimen: Abscess from Chest, Right     Gram stain [[id number]]     Order Status: Sent Specimen: Chest     Culture, Anaerobe [[id number]]     Order Status: Canceled Specimen: Chest     Aerobic culture [[id number]]     Order Status: Sent Specimen: Chest     Fungus culture [[id number]]     Order Status: Canceled Specimen: Wound from Chest, Right     AFB Culture & Smear [[id number]]     Order Status: Canceled Specimen: Skin from Chest, Right               Antimicrobials:  Antibiotics (From admission, onward)      Start     Stop Route Frequency Ordered    08/26/22 0830  vancomycin in dextrose 5 % 1 gram/250 mL IVPB 1,000 mg         -- IV Every 24 hours (non-standard times) 08/26/22 0744    08/25/22 1015  vancomycin 125 mg/5 mL oral solution 125 mg  (C. difficile Infection (CDI) Treatment Order Panel)         -- Oral Every 12 hours 08/25/22 0904    08/24/22 1215  cefTRIAXone (ROCEPHIN) 1 g/50 mL D5W IVPB         -- IV Every 24 hours (non-standard times) 08/24/22 1113    08/24/22 0151  vancomycin - pharmacy to dose  (vancomycin IVPB)        See Hyperspace for full Linked Orders Report.    -- IV pharmacy to manage frequency 08/24/22 0051          Antifungals (From admission, onward)      None          Antivirals (From admission, onward)          Stop Route Frequency     acyclovir         -- Oral 2 times daily              Other Results:  Radiology Results:  X-Ray Chest PA And  Lateral    Result Date: 7/29/2022  EXAMINATION: XR CHEST PA AND LATERAL CLINICAL HISTORY: shortness of breath; Shortness of breath TECHNIQUE: PA and lateral views of the chest were performed. COMPARISON: Chest radiograph 06/10/2022 FINDINGS: Interval placement of an RIJV-approach infusion port catheter with terminus projecting over the expected location of the caval atrial junction. Cardiomediastinal silhouette is within normal limits. Thin persistent trace loculated pleural effusion extends along the posterior aspect of the left hemithorax, unchanged.  No appreciable new focal consolidation, overt interstitial edema, sizable pleural effusion or pneumothorax.  Bilateral calcified pleural plaques, unchanged. Multilevel degenerative changes of the imaged spine.     1. No acute cardiopulmonary process appreciated. Electronically signed by: Álvaro Comer Date:    07/29/2022 Time:    12:45    US Chest Mediastinum    Result Date: 8/24/2022  EXAMINATION: US CHEST MEDIASTINUM CLINICAL HISTORY: swelling overlying port in R chest wall;. COMPARISON: Chest radiograph 08/23/2022.  CT chest 02/22/2022. TECHNIQUE: Limited ultrasound evaluation of right chest wall FINDINGS: Heterogeneous collection about the right chest port measures approximately 3.8 x 1.5 x 2.8 cm.     1. Heterogeneous collection about the right chest port, could represent hematoma. This report was flagged in Epic as abnormal. Electronically signed by resident: Stacey Wall Date:    08/24/2022 Time:    00:42 Electronically signed by: Abdirizak Valadez MD Date:    08/24/2022 Time:    01:03    X-Ray Chest AP Portable    Result Date: 8/23/2022  EXAMINATION: XR CHEST AP PORTABLE CLINICAL HISTORY: shortness of breath, edema; TECHNIQUE: Single frontal view of the chest was performed. COMPARISON: 08/10/2022. FINDINGS: Lordotic positioning.  Right-sided port catheter tip overlies the right atrium.  Loop recorder device overlies the left hemithorax. Underinflated lungs  with hypoventilatory change.  Subsegmental atelectasis at the lung bases.  Calcified pleural plaques, similar to prior. Heart and lungs otherwise appear unchanged when allowing for differences in technique and positioning.     Lordotic positioning. Right-sided port catheter tip overlies the right atrium. Loop recorder device overlies the left hemithorax. Underinflated lungs with hypoventilatory change.  Subsegmental atelectasis at the lung bases. Calcified pleural plaques, similar to prior. Electronically signed by: Abdirizak Valadez MD Date:    08/23/2022 Time:    21:36    X-Ray Chest AP Portable    Result Date: 8/10/2022  EXAMINATION: XR CHEST AP PORTABLE CLINICAL HISTORY: Sepsis; TECHNIQUE: Single frontal view of the chest was performed. COMPARISON: 07/29/2022. FINDINGS: Cardiac silhouette is normal in size.  Right-sided chest port is visualized in stable position.  Loop recorder device is noted.  Lungs are hypoinflated.  Calcified pleural plaques are seen.  No evidence of new focal consolidative process, pneumothorax, or significant pleural effusion.  No acute osseous abnormality identified.     No acute cardiopulmonary process identified. Electronically signed by: Boom Wilson MD Date:    08/10/2022 Time:    23:48    US Upper Extremity Veins Right    Result Date: 8/24/2022  EXAMINATION: US UPPER EXTREMITY VEINS RIGHT CLINICAL HISTORY: rule out DVT; TECHNIQUE: Duplex and color flow Doppler evaluation and dynamic compression was performed of the right upper extremity veins. COMPARISON: CT soft tissue neck 04/12/2022. FINDINGS: Central veins: Thrombosis in the right internal jugular vein.  Partially occlusive thrombus in the right subclavian vein.  The axillary vein is patent and free of thrombus. Arm veins: The brachial, basilic, and cephalic veins are patent and compressible. Contralateral subclavian/internal jugular veins: The left subclavian and internal jugular veins are patent and free of thrombus. Other  findings: None.     Deep vein thrombosis in the right internal jugular vein and right subclavian vein. This report was flagged in Epic as abnormal. COMMUNICATION This critical result was discovered/received at 05:30.  The critical information above was relayed directly by me by telephone to Jamila Kennedy DO on 08/24/2022 at 05:30. Electronically signed by resident: Stacey Wall Date:    08/24/2022 Time:    05:31 Electronically signed by: Isidro Uribe Date:    08/24/2022 Time:    06:05      Current Medications:     Infusions:   heparin (porcine) in D5W 18 Units/kg/hr (08/27/22 0400)        Scheduled:   acyclovir  200 mg Oral BID    amiodarone  200 mg Oral Daily    atorvastatin  40 mg Oral QHS    cefTRIAXone (ROCEPHIN) IVPB  1 g Intravenous Q24H    furosemide  40 mg Oral Daily    gabapentin  200 mg Oral Daily    melatonin  9 mg Oral QHS    metoprolol succinate  25 mg Oral Daily    oxybutynin  5 mg Oral QHS    pantoprazole  40 mg Oral Daily    polyethylene glycol  17 g Oral BID    tamsulosin  1 capsule Oral QHS    vancomycin  125 mg Oral Q12H    vancomycin (VANCOCIN) IVPB  1,000 mg Intravenous Q24H    venlafaxine  75 mg Oral QHS        PRN:  acetaminophen, aluminum-magnesium hydroxide-simethicone, artificial tears, dextrose 10%, dextrose 10%, glucagon (human recombinant), glucose, glucose, heparin (PORCINE), heparin (PORCINE), HYDROcodone-acetaminophen, naloxone, ondansetron, promethazine, senna-docusate 8.6-50 mg, simethicone, sodium chloride 0.9%, Pharmacy to dose Vancomycin consult **AND** vancomycin - pharmacy to dose

## 2022-08-28 PROBLEM — L58.9 RADIATION DERMATITIS: Status: ACTIVE | Noted: 2022-01-01

## 2022-08-28 PROBLEM — D62 ACUTE BLOOD LOSS ANEMIA: Status: ACTIVE | Noted: 2022-01-01

## 2022-08-28 PROBLEM — L02.213 CHEST WALL ABSCESS: Status: ACTIVE | Noted: 2022-01-01

## 2022-08-28 PROBLEM — R22.9 MULTIPLE SKIN NODULES: Status: ACTIVE | Noted: 2022-01-01

## 2022-08-28 NOTE — ASSESSMENT & PLAN NOTE
HGB 6.7  Antiplatelet/anticoagulation: Prev Eliquis, currently Lovenox 150mg BID     Plan:  - Transfuse 1 unit PRBC 8/28   - CBCs daily  - Transfuse with goal Hb > 7

## 2022-08-28 NOTE — ASSESSMENT & PLAN NOTE
77M hx squamous cell H/N cancer s/p radical neck dissection and chemoradiation who presents w/ possible port infection. R chest port w/ overlying erythema and new nodular skin lesions present. Nodular lesions are atypical, and concerning for malignancy vs. Atypical infection (nocardia, fungal, mycobacterial). Blood cx NGTD. Chest US w/ fluid collection surrounding port. Pt on vanc and ceftriaxone.     Recommendations:  - port removed - only tip sent to micro for cultures. No other specimens received by micro lab. Core needle tissue biopsy sent to path, unclear if this will be adequate for evaluation  - recommend re-biopsy of nodular skin lesion by either dermatology or general surgery - send for path, and to micro for aerobic, anaerobic, AFB, fungal and modified AFB cultures  - continue vanc and ceftriaxone  - PO vancomycin 125mg BID while on systemic antibiotics to prevent c. Diff recurrence    Will follow

## 2022-08-28 NOTE — PROGRESS NOTES
Pharmacokinetic Assessment Follow Up: IV Vancomycin    Vancomycin serum concentration assessment(s):    The trough level was drawn correctly and can be used to guide therapy at this time. The measurement is slightly above the desired definitive target range of 15 to 20 mcg/mL.    Vancomycin Regimen Plan:    Change regimen to Vancomycin 750 mg IV every 24 hours with next serum trough concentration measured at 60 minutes  prior to the dose on 8/31 at ~1100  Preemptively decreasing dose due to concern for accumulation    Drug levels (last 3 results):  Recent Labs   Lab Result Units 08/26/22  0231 08/28/22  1131   Vancomycin, Random ug/mL 18.5  --    Vancomycin-Trough ug/mL  --  21.8       Pharmacy will continue to follow and monitor vancomycin.    Please contact pharmacy at extension [phone] for questions regarding this assessment.    Thank you for the consult,   Marge Ryan       Patient brief summary:  Janusz Montgomery Jr. is a 77 y.o. male initiated on antimicrobial therapy with IV Vancomycin for treatment of bacteremia      Drug Allergies:   Review of patient's allergies indicates:   Allergen Reactions    Ciprofloxacin Rash     Diffuse pruritic morbilliform rash developed 3/15/2017 after dose of cipro; previously in 2/2017 he had rash/fevers after initiation of cipro    Zosyn [piperacillin-tazobactam] Rash     Diffuse pruritic morbilliform rash developed 3/15/2017.  Then, 430am dose on 3/16 and rash worsened with SOB/tachypnea but no hypoxemia.     Bacitracin Itching and Rash     Violaceous rash in area of topical Tx.        Actual Body Weight:   143.9kg    Renal Function:   Estimated Creatinine Clearance: 68 mL/min (based on SCr of 1.4 mg/dL).,     Dialysis Method (if applicable):  N/A    CBC (last 72 hours):  Recent Labs   Lab Result Units 08/26/22  0232 08/27/22  0355 08/28/22  0516   WBC K/uL 5.57 6.40 6.04   Hemoglobin g/dL 7.5* 7.2* 6.9*   Hematocrit % 22.2* 20.9* 20.1*   Platelets K/uL 142* 141* 144*   Gran %  % 77.8* 77.5* 75.5*   Lymph % % 8.6* 9.5* 9.9*   Mono % % 11.5 10.6 11.6   Eosinophil % % 1.4 1.6 2.0   Basophil % % 0.5 0.5 0.7   Differential Method  Automated Automated Automated       Metabolic Panel (last 72 hours):  Recent Labs   Lab Result Units 08/26/22  0231 08/28/22  0516   Sodium mmol/L 140 141   Potassium mmol/L 3.5 3.6   Chloride mmol/L 94* 97   CO2 mmol/L 38* 36*   Glucose mg/dL 100 95   BUN mg/dL 14 14   Creatinine mg/dL 1.7* 1.4   Albumin g/dL 2.9* 2.7*   Total Bilirubin mg/dL 0.9 0.9   Alkaline Phosphatase U/L 137* 133   AST U/L 14 22   ALT U/L 12 19   Magnesium mg/dL 1.6  --    Phosphorus mg/dL 3.1  --        Vancomycin Administrations:  vancomycin given in the last 96 hours                     vancomycin in dextrose 5 % 1 gram/250 mL IVPB 1,000 mg (mg) 1,000 mg New Bag 08/28/22 1248     1,000 mg New Bag 08/27/22 1214     1,000 mg New Bag 08/26/22 0849    vancomycin 125 mg/5 mL oral solution 125 mg (mg) 125 mg Given 08/28/22 0813     125 mg Given 08/27/22 2148     125 mg Given  0901     125 mg Given 08/26/22 2227     125 mg Given  0839     125 mg Given 08/25/22 2128     125 mg Given  1129    vancomycin 1.25 g in dextrose 5% 250 mL IVPB (ready to mix) (mg) 1,250 mg New Bag 08/25/22 0449                    Microbiologic Results:  Microbiology Results (last 7 days)       Procedure Component Value Units Date/Time    Blood culture [[id number]] Collected: 08/24/22 0112    Order Status: Completed Specimen: Blood from Peripheral, Hand, Right Updated: 08/28/22 0612     Blood Culture, Routine No Growth to date      No Growth to date      No Growth to date      No Growth to date      No Growth to date    Blood culture [[id number]] Collected: 08/24/22 0113    Order Status: Completed Specimen: Blood from Peripheral, Lower Arm, Right Updated: 08/28/22 0612     Blood Culture, Routine No Growth to date      No Growth to date      No Growth to date      No Growth to date      No Growth to date    IV catheter culture  [[id number]] Collected: 08/26/22 1603    Order Status: Completed Specimen: Abscess Updated: 08/27/22 0721     Aerobic Culture - Cath tip No growth    Tissue culture [[id number]] Collected: 08/26/22 1609    Order Status: Canceled Specimen: Tissue from Chest, Right     Direct AFB stain [[id number]] Collected: 08/26/22 1607    Order Status: Canceled Specimen: Abscess from Chest, Right     Modified Acid Fast Stain For Nocardia [[id number]] Collected: 08/26/22 1607    Order Status: Canceled Specimen: Abscess from Chest, Right     AFB Culture & Smear [[id number]] Collected: 08/26/22 1607    Order Status: Canceled Specimen: Abscess from Chest, Right     Culture, Anaerobe [[id number]] Collected: 08/26/22 1607    Order Status: Canceled Specimen: Abscess from Chest, Right     Fungus culture [[id number]] Collected: 08/26/22 1607    Order Status: Canceled Specimen: Abscess from Chest, Right     Tissue culture [[id number]]     Order Status: Canceled Specimen: Tissue from Chest, Right     Tissue culture [[id number]] Collected: 08/26/22 1603    Order Status: Canceled Specimen: Abscess from Chest, Right     Gram stain [[id number]]     Order Status: Sent Specimen: Chest     Culture, Anaerobe [[id number]]     Order Status: Canceled Specimen: Chest     Aerobic culture [[id number]]     Order Status: Sent Specimen: Chest     Fungus culture [[id number]]     Order Status: Canceled Specimen: Wound from Chest, Right     AFB Culture & Smear [[id number]]     Order Status: Canceled Specimen: Skin from Chest, Right

## 2022-08-28 NOTE — SUBJECTIVE & OBJECTIVE
Interval History: no events, breathing appears worse today w/ increased work of breathing/accessory muscle use. Discussed w/ micro - unfortunately the only specimen they received for culture was the line tip. No tissue cultures were received.     Review of Systems   Constitutional:  Positive for appetite change. Negative for activity change, chills and fever.   HENT:  Negative for congestion, dental problem, ear pain and rhinorrhea.    Eyes:  Negative for pain and discharge.   Respiratory:  Positive for shortness of breath. Negative for cough.    Cardiovascular:  Negative for chest pain and leg swelling.   Gastrointestinal:  Negative for abdominal distention, abdominal pain, constipation, diarrhea, nausea and vomiting.   Genitourinary:  Negative for difficulty urinating and dysuria.   Musculoskeletal:  Negative for arthralgias, back pain and joint swelling.   Skin:  Positive for color change. Negative for rash and wound.        L heel chronic wound   Allergic/Immunologic: Positive for immunocompromised state.   Neurological:  Negative for dizziness, light-headedness and headaches.   Psychiatric/Behavioral:  Negative for behavioral problems and confusion.    Objective:     Vital Signs (Most Recent):  Temp: 97.7 °F (36.5 °C) (08/28/22 1549)  Pulse: 76 (08/28/22 1549)  Resp: 18 (08/28/22 1549)  BP: (!) 110/59 (08/28/22 1549)  SpO2: 100 % (08/28/22 1549)   Vital Signs (24h Range):  Temp:  [97.7 °F (36.5 °C)-98.7 °F (37.1 °C)] 97.7 °F (36.5 °C)  Pulse:  [75-98] 76  Resp:  [16-20] 18  SpO2:  [94 %-100 %] 100 %  BP: (110-147)/(52-62) 110/59     Weight: (!) 143.9 kg (317 lb 3.9 oz)  Body mass index is 41.86 kg/m².    Estimated Creatinine Clearance: 68 mL/min (based on SCr of 1.4 mg/dL).    Physical Exam  Constitutional:       General: He is not in acute distress.     Appearance: Normal appearance. He is well-developed. He is not ill-appearing or diaphoretic.   HENT:      Head: Normocephalic and atraumatic.      Right Ear:  External ear normal.      Left Ear: External ear normal.      Nose: Nose normal.   Eyes:      General: No scleral icterus.        Right eye: No discharge.         Left eye: No discharge.      Extraocular Movements: Extraocular movements intact.      Conjunctiva/sclera: Conjunctivae normal.   Neck:      Comments: R neck erythema   Pulmonary:      Effort: Pulmonary effort is normal. No respiratory distress.      Breath sounds: No stridor.   Musculoskeletal:      Right lower leg: Edema present.      Left lower leg: Edema present.      Comments: B/l LE erythema R>L, warmth and tenderness   Skin:     General: Skin is dry.      Coloration: Skin is not jaundiced or pale.      Findings: Erythema and lesion present.      Comments: Erythema of R chest overlying port  No fluctuance  Atypical appearing nodular skin lesion present   Neurological:      General: No focal deficit present.      Mental Status: He is alert and oriented to person, place, and time. Mental status is at baseline.   Psychiatric:         Mood and Affect: Mood normal.         Behavior: Behavior normal.         Thought Content: Thought content normal.         Judgment: Judgment normal.       Significant Labs: CBC:   Recent Labs   Lab 08/27/22  0355 08/28/22  0516   WBC 6.40 6.04   HGB 7.2* 6.9*   HCT 20.9* 20.1*   * 144*     CMP:   Recent Labs   Lab 08/28/22  0516      K 3.6   CL 97   CO2 36*   GLU 95   BUN 14   CREATININE 1.4   CALCIUM 8.7   PROT 5.8*   ALBUMIN 2.7*   BILITOT 0.9   ALKPHOS 133   AST 22   ALT 19   ANIONGAP 8     Microbiology Results (last 7 days)       Procedure Component Value Units Date/Time    Blood culture [[id number]] Collected: 08/24/22 0112    Order Status: Completed Specimen: Blood from Peripheral, Hand, Right Updated: 08/28/22 0612     Blood Culture, Routine No Growth to date      No Growth to date      No Growth to date      No Growth to date      No Growth to date    Blood culture [[id number]] Collected: 08/24/22 0113     Order Status: Completed Specimen: Blood from Peripheral, Lower Arm, Right Updated: 08/28/22 0612     Blood Culture, Routine No Growth to date      No Growth to date      No Growth to date      No Growth to date      No Growth to date    IV catheter culture [[id number]] Collected: 08/26/22 1603    Order Status: Completed Specimen: Abscess Updated: 08/27/22 0721     Aerobic Culture - Cath tip No growth    Tissue culture [[id number]] Collected: 08/26/22 1609    Order Status: Canceled Specimen: Tissue from Chest, Right     Direct AFB stain [[id number]] Collected: 08/26/22 1607    Order Status: Canceled Specimen: Abscess from Chest, Right     Modified Acid Fast Stain For Nocardia [[id number]] Collected: 08/26/22 1607    Order Status: Canceled Specimen: Abscess from Chest, Right     AFB Culture & Smear [[id number]] Collected: 08/26/22 1607    Order Status: Canceled Specimen: Abscess from Chest, Right     Culture, Anaerobe [[id number]] Collected: 08/26/22 1607    Order Status: Canceled Specimen: Abscess from Chest, Right     Fungus culture [[id number]] Collected: 08/26/22 1607    Order Status: Canceled Specimen: Abscess from Chest, Right     Tissue culture [[id number]]     Order Status: Canceled Specimen: Tissue from Chest, Right     Tissue culture [[id number]] Collected: 08/26/22 1603    Order Status: Canceled Specimen: Abscess from Chest, Right     Gram stain [[id number]]     Order Status: Sent Specimen: Chest     Culture, Anaerobe [[id number]]     Order Status: Canceled Specimen: Chest     Aerobic culture [[id number]]     Order Status: Sent Specimen: Chest     Fungus culture [[id number]]     Order Status: Canceled Specimen: Wound from Chest, Right     AFB Culture & Smear [[id number]]     Order Status: Canceled Specimen: Skin from Chest, Right             Significant Imaging: I have reviewed all pertinent imaging results/findings within the past 24 hours.

## 2022-08-28 NOTE — ASSESSMENT & PLAN NOTE
Patient with Hypoxic Respiratory failure which is Chronic.  he is on home oxygen at 4 LPM. Supplemental oxygen was provided and noted-  .   Signs/symptoms of respiratory failure include- lethargy. Contributing diagnoses includes - CHF, restrictive lung disease. Labs and images were reviewed. Patient Has not had a recent ABG. Will treat underlying causes and adjust management of respiratory failure as follows- Continue home 4L O2    - Duonebs PRN for mild wheezing on 8/28   - repeat CXR negative for acute findings

## 2022-08-28 NOTE — PROGRESS NOTES
[facility] - Oncology ([facility])  Hematology/Oncology  Progress Note    Patient Name: Janusz Montogmery Jr.  Admission Date: 8/23/2022  Hospital Length of Stay: 4 days  Code Status: Full Code     Subjective:     HPI:  Janusz Montgomery is a 77 y.o. 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 Follows with Dr. Hargrove. Completed chemoradiation therapy on 8/1/22. Reports some redness to right neck which he attributes to radiation. However, has since developed redness, swelling, and tenderness overlying right chest port concerning for possible infection over last 2-3 days. Denies fever or chills. Increasing edema in extremities worse in RUE over last 4-5 days and generalized weakness causing difficulty ambulating. Recently hospitalized with dehydration, NAT, weakness, C dif. Completed vancomycin PO 7 days and diarrhea resolved. Rehydrated and concern that he then became overloaded. Cr uptrending on discharge. Recently saw nephro 8/22 for hospital f/u, noted worsening Cr baseline and increased Lasix to 40 mg daily.      Interval History: NAEON. He reports new sore throat, SOB and wheezing. He reports he  has had these sxs in the past and associates it with  dry air. He reports he has used DuoNebs in  the past with improvement.     Oncology Treatment Plan:   OP CARBOPLATIN WEEKLY    Medications:  Continuous Infusions:  Scheduled Meds:   acyclovir  200 mg Oral BID    albuterol-ipratropium  3 mL Nebulization Q6H    amiodarone  200 mg Oral Daily    atorvastatin  40 mg Oral QHS    cefTRIAXone (ROCEPHIN) IVPB  1 g Intravenous Q24H    enoxaparin  150 mg Subcutaneous Q12H    furosemide  40 mg Oral Daily    gabapentin  200 mg Oral Daily    melatonin  9 mg Oral QHS    metoprolol succinate  25 mg Oral Daily     oxybutynin  5 mg Oral QHS    pantoprazole  40 mg Oral Daily    polyethylene glycol  17 g Oral BID    tamsulosin  1 capsule Oral QHS    vancomycin  125 mg Oral Q12H    [START ON 8/29/2022] vancomycin (VANCOCIN) IVPB  750 mg Intravenous Q24H    venlafaxine  75 mg Oral QHS     PRN Meds:sodium chloride, acetaminophen, aluminum-magnesium hydroxide-simethicone, artificial tears, dextrose 10%, dextrose 10%, glucagon (human recombinant), glucose, glucose, HYDROcodone-acetaminophen, naloxone, ondansetron, promethazine, senna-docusate 8.6-50 mg, simethicone, sodium chloride 0.9%, Pharmacy to dose Vancomycin consult **AND** vancomycin - pharmacy to dose     Review of Systems   Constitutional:  Negative for chills, diaphoresis, fatigue and fever.   HENT:  Positive for sore throat. Negative for congestion, rhinorrhea and trouble swallowing.    Respiratory:  Positive for shortness of breath. Negative for cough, chest tightness and wheezing.    Cardiovascular:  Negative for chest pain, palpitations and leg swelling.   Gastrointestinal:  Negative for abdominal distention, abdominal pain, blood in stool, diarrhea, nausea and vomiting.   Genitourinary:  Negative for difficulty urinating, dysuria, flank pain and hematuria.   Musculoskeletal:  Negative for arthralgias, back pain and neck pain.   Skin:  Negative for rash and wound.   Neurological:  Negative for dizziness, syncope, weakness, light-headedness, numbness and headaches.   Objective:     Vital Signs (Most Recent):  Temp: 98.5 °F (36.9 °C) (08/28/22 1107)  Pulse: 86 (08/28/22 1107)  Resp: 20 (08/28/22 1107)  BP: 123/62 (08/28/22 1107)  SpO2: 98 % (08/28/22 1107) Vital Signs (24h Range):  Temp:  [97.9 °F (36.6 °C)-98.7 °F (37.1 °C)] 98.5 °F (36.9 °C)  Pulse:  [77-98] 86  Resp:  [16-20] 20  SpO2:  [94 %-100 %] 98 %  BP: (120-147)/(52-62) 123/62     Weight: (!) 143.9 kg (317 lb 3.9 oz)  Body mass index is 41.86 kg/m².  Body surface area is 2.72 meters  squared.      Intake/Output Summary (Last 24 hours) at 8/28/2022 1427  Last data filed at 8/28/2022 0545  Gross per 24 hour   Intake 240 ml   Output 1300 ml   Net -1060 ml       Physical Exam  Constitutional:       General: He is not in acute distress.     Appearance: He is not ill-appearing.   HENT:      Head: Normocephalic and atraumatic.      Mouth/Throat:      Mouth: Mucous membranes are moist.      Pharynx: Oropharynx is clear.   Eyes:      Extraocular Movements: Extraocular movements intact.      Pupils: Pupils are equal, round, and reactive to light.   Neck:      Comments: Right neck erythema  Cardiovascular:      Rate and Rhythm: Normal rate and regular rhythm.      Pulses: Normal pulses.      Heart sounds: Normal heart sounds.   Pulmonary:      Effort: Pulmonary effort is normal. No respiratory distress.      Breath sounds: Wheezing present.   Abdominal:      General: Abdomen is flat. Bowel sounds are normal.      Palpations: Abdomen is soft.   Musculoskeletal:      Cervical back: Neck supple.      Right lower leg: Edema present.      Left lower leg: Edema present.   Skin:     General: Skin is warm and dry.      Capillary Refill: Capillary refill takes less than 2 seconds.      Comments: Incision from  port removal that is CDI with  improvement in erythema. No active bleeding.    Neurological:      General: No focal deficit present.      Mental Status: He is alert and oriented to person, place, and time. Mental status is at baseline.   Psychiatric:         Mood and Affect: Mood normal.         Behavior: Behavior normal.         Thought Content: Thought content normal.         Judgment: Judgment normal.       Significant Labs:   Recent Lab Results         08/28/22  1131   08/28/22  0516   08/27/22  1843        Unit Blood Type Code 6200  [P]           Unit Expiration [id number]  [P]           Unit Blood Type A POS  [P]           Albumin   2.7         Alkaline Phosphatase   133         ALT   19         Anion  Gap   8         aPTT     126.7  Comment: aPTT therapeutic range = 39-69 seconds       AST   22         Baso #   0.04         Basophil %   0.7         BILIRUBIN TOTAL   0.9  Comment: For infants and newborns, interpretation of results should be based  on gestational age, weight and in agreement with clinical  observations.    Premature Infant recommended reference ranges:  Up to 24 hours.............<8.0 mg/dL  Up to 48 hours............<12.0 mg/dL  3-5 days..................<15.0 mg/dL  6-29 days.................<15.0 mg/dL           BUN   14         Calcium   8.7         Chloride   97         CO2   36         CODING SYSTEM [id number]  [P]           Creatinine   1.4         Differential Method   Automated         DISPENSE STATUS ISSUED  [P]           eGFR   52.4         Eos #   0.1         Eosinophil %   2.0         Glucose   95         Gran # (ANC)   4.6         Gran %   75.5         Group & Rh A POS           Hematocrit   20.1         Hemoglobin   6.9         Immature Grans (Abs)   0.02  Comment: Mild elevation in immature granulocytes is non specific and   can be seen in a variety of conditions including stress response,   acute inflammation, trauma and pregnancy. Correlation with other   laboratory and clinical findings is essential.           Immature Granulocytes   0.3         INDIRECT KANU NEG           Lymph #   0.6         Lymph %   9.9         MCH   36.3         MCHC   34.3         MCV   106         Mono #   0.7         Mono %   11.6         MPV   10.5         nRBC   0         Platelets   144         Potassium   3.6         Product Code [id number]  [P]           PROTEIN TOTAL   5.8         RBC   1.90         RDW   17.2         Sodium   141         UNIT NUMBER [id number]  [P]           Vancomycin-Trough 21.8           WBC   6.04                  [P] - Preliminary Result               Diagnostic Results:  I have reviewed all pertinent imaging results/findings within the past 24 hours.    Assessment/Plan:      * Infection of venous access port  -- Suspect likely infection of right chest venous access port  -- No evidence of sepsis  -- Hemodynamically stable  -- Slightly elevated inflammatory markers  -- Two small indurated bumps concerning for possible abscesses    PLAN:  -- Continue vancomycin and ceftriaxone per ID recs  -- F/u Blood, tissue, port Cx   -- F/u tissue pathology  -- ID on board  -- Port removed by IR 8/26     Acute blood loss anemia  HGB 6.7  Antiplatelet/anticoagulation: Prev Eliquis, currently Lovenox 150mg BID     Plan:  - Transfuse 1 unit PRBC 8/28   - CBCs daily  - Transfuse with goal Hb > 7      Acute DVT (deep venous thrombosis)  - Plan to start Lovenox 8/28.   - Check anti Xa after  3 doses of Lovenox 8/29 at 1400    Acute decompensated heart failure  -- Worsening edema, seems to have progressed more since fluid resuscitation on recent hospitalization.   -- Requiring recent increases in Lasix 20 mg from PRN to daily to 40 mg daily  -- BNP upper level normal. Although likely falsely low in setting of obesity  -- Volume overloaded on exam  -- Lasix 40 mg IV transitioned to 40mg PO  -- Low sodium diet, which he has some non-adherence to per recent cardiology visit  -- Follows closely with cardiology. Scheduled to see 8/29      Chronic respiratory failure with hypoxia  Patient with Hypoxic Respiratory failure which is Chronic.  he is on home oxygen at 4 LPM. Supplemental oxygen was provided and noted-  .   Signs/symptoms of respiratory failure include- lethargy. Contributing diagnoses includes - CHF, restrictive lung disease. Labs and images were reviewed. Patient Has not had a recent ABG. Will treat underlying causes and adjust management of respiratory failure as follows- Continue home 4L O2    - Duonebs PRN for mild wheezing on 8/28   - repeat CXR negative for acute findings     Head and neck cancer  -- Squamous cell carcinoma of the right head and neck s/p radical neck dissection and  chemoradiation with carboplatin, completed 8/1/22. Follows with Dr. Hargrove.     Paroxysmal atrial fibrillation   -- Continue home amiodarone 200 mg daily, metoprolol succinate 25 mg daily  -- F/u with his cardiologist (Dr. Sandhu) as scheduled  -- Eliquis 5mg transitioned  To  Heparin gtt for DVT on Eliquis              Ilan Cueva,   Hematology/Oncology  [facility] - Oncology ([facility])

## 2022-08-28 NOTE — SUBJECTIVE & OBJECTIVE
Interval History: NAEON. He reports new sore throat, SOB and wheezing. He reports he  has had these sxs in the past and associates it with  dry air. He reports he has used DuoNebs in  the past with improvement.     Oncology Treatment Plan:   OP CARBOPLATIN WEEKLY    Medications:  Continuous Infusions:  Scheduled Meds:   acyclovir  200 mg Oral BID    albuterol-ipratropium  3 mL Nebulization Q6H    amiodarone  200 mg Oral Daily    atorvastatin  40 mg Oral QHS    cefTRIAXone (ROCEPHIN) IVPB  1 g Intravenous Q24H    enoxaparin  150 mg Subcutaneous Q12H    furosemide  40 mg Oral Daily    gabapentin  200 mg Oral Daily    melatonin  9 mg Oral QHS    metoprolol succinate  25 mg Oral Daily    oxybutynin  5 mg Oral QHS    pantoprazole  40 mg Oral Daily    polyethylene glycol  17 g Oral BID    tamsulosin  1 capsule Oral QHS    vancomycin  125 mg Oral Q12H    [START ON 8/29/2022] vancomycin (VANCOCIN) IVPB  750 mg Intravenous Q24H    venlafaxine  75 mg Oral QHS     PRN Meds:sodium chloride, acetaminophen, aluminum-magnesium hydroxide-simethicone, artificial tears, dextrose 10%, dextrose 10%, glucagon (human recombinant), glucose, glucose, HYDROcodone-acetaminophen, naloxone, ondansetron, promethazine, senna-docusate 8.6-50 mg, simethicone, sodium chloride 0.9%, Pharmacy to dose Vancomycin consult **AND** vancomycin - pharmacy to dose     Review of Systems   Constitutional:  Negative for chills, diaphoresis, fatigue and fever.   HENT:  Positive for sore throat. Negative for congestion, rhinorrhea and trouble swallowing.    Respiratory:  Positive for shortness of breath. Negative for cough, chest tightness and wheezing.    Cardiovascular:  Negative for chest pain, palpitations and leg swelling.   Gastrointestinal:  Negative for abdominal distention, abdominal pain, blood in stool, diarrhea, nausea and vomiting.   Genitourinary:  Negative for difficulty urinating, dysuria, flank pain and hematuria.   Musculoskeletal:  Negative for  arthralgias, back pain and neck pain.   Skin:  Negative for rash and wound.   Neurological:  Negative for dizziness, syncope, weakness, light-headedness, numbness and headaches.   Objective:     Vital Signs (Most Recent):  Temp: 98.5 °F (36.9 °C) (08/28/22 1107)  Pulse: 86 (08/28/22 1107)  Resp: 20 (08/28/22 1107)  BP: 123/62 (08/28/22 1107)  SpO2: 98 % (08/28/22 1107) Vital Signs (24h Range):  Temp:  [97.9 °F (36.6 °C)-98.7 °F (37.1 °C)] 98.5 °F (36.9 °C)  Pulse:  [77-98] 86  Resp:  [16-20] 20  SpO2:  [94 %-100 %] 98 %  BP: (120-147)/(52-62) 123/62     Weight: (!) 143.9 kg (317 lb 3.9 oz)  Body mass index is 41.86 kg/m².  Body surface area is 2.72 meters squared.      Intake/Output Summary (Last 24 hours) at 8/28/2022 1427  Last data filed at 8/28/2022 0545  Gross per 24 hour   Intake 240 ml   Output 1300 ml   Net -1060 ml       Physical Exam  Constitutional:       General: He is not in acute distress.     Appearance: He is not ill-appearing.   HENT:      Head: Normocephalic and atraumatic.      Mouth/Throat:      Mouth: Mucous membranes are moist.      Pharynx: Oropharynx is clear.   Eyes:      Extraocular Movements: Extraocular movements intact.      Pupils: Pupils are equal, round, and reactive to light.   Neck:      Comments: Right neck erythema  Cardiovascular:      Rate and Rhythm: Normal rate and regular rhythm.      Pulses: Normal pulses.      Heart sounds: Normal heart sounds.   Pulmonary:      Effort: Pulmonary effort is normal. No respiratory distress.      Breath sounds: Wheezing present.   Abdominal:      General: Abdomen is flat. Bowel sounds are normal.      Palpations: Abdomen is soft.   Musculoskeletal:      Cervical back: Neck supple.      Right lower leg: Edema present.      Left lower leg: Edema present.   Skin:     General: Skin is warm and dry.      Capillary Refill: Capillary refill takes less than 2 seconds.      Comments: Incision from  port removal that is CDI with  improvement in  erythema. No active bleeding.    Neurological:      General: No focal deficit present.      Mental Status: He is alert and oriented to person, place, and time. Mental status is at baseline.   Psychiatric:         Mood and Affect: Mood normal.         Behavior: Behavior normal.         Thought Content: Thought content normal.         Judgment: Judgment normal.       Significant Labs:   Recent Lab Results         08/28/22  1131   08/28/22  0516   08/27/22  1843        Unit Blood Type Code 6200  [P]           Unit Expiration [id number]  [P]           Unit Blood Type A POS  [P]           Albumin   2.7         Alkaline Phosphatase   133         ALT   19         Anion Gap   8         aPTT     126.7  Comment: aPTT therapeutic range = 39-69 seconds       AST   22         Baso #   0.04         Basophil %   0.7         BILIRUBIN TOTAL   0.9  Comment: For infants and newborns, interpretation of results should be based  on gestational age, weight and in agreement with clinical  observations.    Premature Infant recommended reference ranges:  Up to 24 hours.............<8.0 mg/dL  Up to 48 hours............<12.0 mg/dL  3-5 days..................<15.0 mg/dL  6-29 days.................<15.0 mg/dL           BUN   14         Calcium   8.7         Chloride   97         CO2   36         CODING SYSTEM [id number]  [P]           Creatinine   1.4         Differential Method   Automated         DISPENSE STATUS ISSUED  [P]           eGFR   52.4         Eos #   0.1         Eosinophil %   2.0         Glucose   95         Gran # (ANC)   4.6         Gran %   75.5         Group & Rh A POS           Hematocrit   20.1         Hemoglobin   6.9         Immature Grans (Abs)   0.02  Comment: Mild elevation in immature granulocytes is non specific and   can be seen in a variety of conditions including stress response,   acute inflammation, trauma and pregnancy. Correlation with other   laboratory and clinical findings is essential.           Immature  Granulocytes   0.3         INDIRECT KANU NEG           Lymph #   0.6         Lymph %   9.9         MCH   36.3         MCHC   34.3         MCV   106         Mono #   0.7         Mono %   11.6         MPV   10.5         nRBC   0         Platelets   144         Potassium   3.6         Product Code [id number]  [P]           PROTEIN TOTAL   5.8         RBC   1.90         RDW   17.2         Sodium   141         UNIT NUMBER [id number]  [P]           Vancomycin-Trough 21.8           WBC   6.04                  [P] - Preliminary Result               Diagnostic Results:  I have reviewed all pertinent imaging results/findings within the past 24 hours.

## 2022-08-28 NOTE — PLAN OF CARE
Pt Aox4, VS remained stable throughout shift, no PRN meds given. Pt to [facility] with assist. Educated on POC, resting comfortably, bed low and locked, call light within reach, will continue to monitor.     Problem: Adult Inpatient Plan of Care  Goal: Plan of Care Review  Outcome: Ongoing, Progressing     Problem: Adult Inpatient Plan of Care  Goal: Patient-Specific Goal (Individualized)  Outcome: Ongoing, Progressing     Problem: Adult Inpatient Plan of Care  Goal: Absence of Hospital-Acquired Illness or Injury  Outcome: Ongoing, Progressing     Problem: Adult Inpatient Plan of Care  Goal: Optimal Comfort and Wellbeing  Outcome: Ongoing, Progressing     Problem: Adult Inpatient Plan of Care  Goal: Readiness for Transition of Care  Outcome: Ongoing, Progressing

## 2022-08-28 NOTE — PROGRESS NOTES
[facility] Hemphill - Oncology ([facility])  Infectious Disease  Progress Note    Patient Name: Janusz Montgomery Jr.  MRN: [id number]  Admission Date: 8/23/2022  Length of Stay: 4 days  Attending Physician: Mirlande Gupta MD  Primary Care Provider: Miguelina Weathers MD    Isolation Status: No active isolations  Assessment/Plan:      * Infection of venous access port  77M hx squamous cell H/N cancer s/p radical neck dissection and chemoradiation who presents w/ possible port infection. R chest port w/ overlying erythema and new nodular skin lesions present. Nodular lesions are atypical, and concerning for malignancy vs. Atypical infection (nocardia, fungal, mycobacterial). Blood cx NGTD. Chest US w/ fluid collection surrounding port. Pt on vanc and ceftriaxone.     Recommendations:  - port removed - only tip sent to micro for cultures. No other specimens received by micro lab. Core needle tissue biopsy sent to path, unclear if this will be adequate for evaluation  - recommend re-biopsy of nodular skin lesion by either dermatology or general surgery - send for path, and to micro for aerobic, anaerobic, AFB, fungal and modified AFB cultures  - continue vanc and ceftriaxone  - PO vancomycin 125mg BID while on systemic antibiotics to prevent c. Diff recurrence    Will follow      Anticipated Disposition: TBD    Thank you for your consult. I will follow-up with patient. Please contact us if you have any additional questions.    Sharon Santana DO  Transplant Infectious Disease    Time: 35 minutes   50% of time spent on face-to-face counseling and coordination of care. Counseling included review of test results, diagnosis, and treatment plan with patient and/or family.        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 Completed  chemoradiation therapy on 8/1/22. Pt endorses some erythema to R side of neck that he attributed to radiation treatments, however states this progressed to involve the area overlying his port, with sudden development of tender nodular lesion on chest. He denies any fevers at home. Also endorses increased redness of R>L lower extremities w/ increased swelling and tenderness. Of note, recently completed a 7 day course of PO vancomycin for c. Diff infection, not currently complaining of diarrhea. Blood cultures obtained, and patient has been started on vanc and ceftriaxone for possible port infection. US of chest shows fluid collection surrounding port. ID consulted for assistance.     Interval History: no events, breathing appears worse today w/ increased work of breathing/accessory muscle use. Discussed w/ micro - unfortunately the only specimen they received for culture was the line tip. No tissue cultures were received.     Review of Systems   Constitutional:  Positive for appetite change. Negative for activity change, chills and fever.   HENT:  Negative for congestion, dental problem, ear pain and rhinorrhea.    Eyes:  Negative for pain and discharge.   Respiratory:  Positive for shortness of breath. Negative for cough.    Cardiovascular:  Negative for chest pain and leg swelling.   Gastrointestinal:  Negative for abdominal distention, abdominal pain, constipation, diarrhea, nausea and vomiting.   Genitourinary:  Negative for difficulty urinating and dysuria.   Musculoskeletal:  Negative for arthralgias, back pain and joint swelling.   Skin:  Positive for color change. Negative for rash and wound.        L heel chronic wound   Allergic/Immunologic: Positive for immunocompromised state.   Neurological:  Negative for dizziness, light-headedness and headaches.   Psychiatric/Behavioral:  Negative for behavioral problems and confusion.    Objective:     Vital Signs (Most Recent):  Temp: 97.7 °F (36.5 °C) (08/28/22  1549)  Pulse: 76 (08/28/22 1549)  Resp: 18 (08/28/22 1549)  BP: (!) 110/59 (08/28/22 1549)  SpO2: 100 % (08/28/22 1549)   Vital Signs (24h Range):  Temp:  [97.7 °F (36.5 °C)-98.7 °F (37.1 °C)] 97.7 °F (36.5 °C)  Pulse:  [75-98] 76  Resp:  [16-20] 18  SpO2:  [94 %-100 %] 100 %  BP: (110-147)/(52-62) 110/59     Weight: (!) 143.9 kg (317 lb 3.9 oz)  Body mass index is 41.86 kg/m².    Estimated Creatinine Clearance: 68 mL/min (based on SCr of 1.4 mg/dL).    Physical Exam  Constitutional:       General: He is not in acute distress.     Appearance: Normal appearance. He is well-developed. He is not ill-appearing or diaphoretic.   HENT:      Head: Normocephalic and atraumatic.      Right Ear: External ear normal.      Left Ear: External ear normal.      Nose: Nose normal.   Eyes:      General: No scleral icterus.        Right eye: No discharge.         Left eye: No discharge.      Extraocular Movements: Extraocular movements intact.      Conjunctiva/sclera: Conjunctivae normal.   Neck:      Comments: R neck erythema   Pulmonary:      Effort: Pulmonary effort is normal. No respiratory distress.      Breath sounds: No stridor.   Musculoskeletal:      Right lower leg: Edema present.      Left lower leg: Edema present.      Comments: B/l LE erythema R>L, warmth and tenderness   Skin:     General: Skin is dry.      Coloration: Skin is not jaundiced or pale.      Findings: Erythema and lesion present.      Comments: Erythema of R chest overlying port  No fluctuance  Atypical appearing nodular skin lesion present   Neurological:      General: No focal deficit present.      Mental Status: He is alert and oriented to person, place, and time. Mental status is at baseline.   Psychiatric:         Mood and Affect: Mood normal.         Behavior: Behavior normal.         Thought Content: Thought content normal.         Judgment: Judgment normal.       Significant Labs: CBC:   Recent Labs   Lab 08/27/22  0355 08/28/22  0516   WBC 6.40  6.04   HGB 7.2* 6.9*   HCT 20.9* 20.1*   * 144*     CMP:   Recent Labs   Lab 08/28/22  0516      K 3.6   CL 97   CO2 36*   GLU 95   BUN 14   CREATININE 1.4   CALCIUM 8.7   PROT 5.8*   ALBUMIN 2.7*   BILITOT 0.9   ALKPHOS 133   AST 22   ALT 19   ANIONGAP 8     Microbiology Results (last 7 days)       Procedure Component Value Units Date/Time    Blood culture [[id number]] Collected: 08/24/22 0112    Order Status: Completed Specimen: Blood from Peripheral, Hand, Right Updated: 08/28/22 0612     Blood Culture, Routine No Growth to date      No Growth to date      No Growth to date      No Growth to date      No Growth to date    Blood culture [[id number]] Collected: 08/24/22 0113    Order Status: Completed Specimen: Blood from Peripheral, Lower Arm, Right Updated: 08/28/22 0612     Blood Culture, Routine No Growth to date      No Growth to date      No Growth to date      No Growth to date      No Growth to date    IV catheter culture [[id number]] Collected: 08/26/22 1603    Order Status: Completed Specimen: Abscess Updated: 08/27/22 0721     Aerobic Culture - Cath tip No growth    Tissue culture [[id number]] Collected: 08/26/22 1609    Order Status: Canceled Specimen: Tissue from Chest, Right     Direct AFB stain [[id number]] Collected: 08/26/22 1607    Order Status: Canceled Specimen: Abscess from Chest, Right     Modified Acid Fast Stain For Nocardia [[id number]] Collected: 08/26/22 1607    Order Status: Canceled Specimen: Abscess from Chest, Right     AFB Culture & Smear [[id number]] Collected: 08/26/22 1607    Order Status: Canceled Specimen: Abscess from Chest, Right     Culture, Anaerobe [[id number]] Collected: 08/26/22 1607    Order Status: Canceled Specimen: Abscess from Chest, Right     Fungus culture [[id number]] Collected: 08/26/22 1607    Order Status: Canceled Specimen: Abscess from Chest, Right     Tissue culture [[id number]]     Order Status: Canceled Specimen: Tissue from Chest, Right     Tissue  culture [[id number]] Collected: 08/26/22 1603    Order Status: Canceled Specimen: Abscess from Chest, Right     Gram stain [[id number]]     Order Status: Sent Specimen: Chest     Culture, Anaerobe [[id number]]     Order Status: Canceled Specimen: Chest     Aerobic culture [[id number]]     Order Status: Sent Specimen: Chest     Fungus culture [[id number]]     Order Status: Canceled Specimen: Wound from Chest, Right     AFB Culture & Smear [[id number]]     Order Status: Canceled Specimen: Skin from Chest, Right             Significant Imaging: I have reviewed all pertinent imaging results/findings within the past 24 hours.

## 2022-08-29 PROBLEM — I50.9 ACUTE DECOMPENSATED HEART FAILURE: Status: RESOLVED | Noted: 2022-01-01 | Resolved: 2022-01-01

## 2022-08-29 NOTE — ASSESSMENT & PLAN NOTE
-Patient with Paroxysmal (<7 days) atrial fibrillation which is controlled currently with Amiodarone.   -Patient is currently in sinus rhythm.[id number] Score: 3.   Anticoagulation indicated. Anticoagulation done with apixaban

## 2022-08-29 NOTE — PLAN OF CARE
Patient A/O x4 during shift. No complaints to report over night. No PRN medications needed. Patient remains on 4L NC which is home o2 amount. CPAP wore during the night. VSS. Call light and personal belongings remained in reach. Bed locked and in lowest position throughout shift.     Problem: Adult Inpatient Plan of Care  Goal: Absence of Hospital-Acquired Illness or Injury  Outcome: Ongoing, Progressing  Goal: Readiness for Transition of Care  Outcome: Ongoing, Progressing     Problem: Infection  Goal: Absence of Infection Signs and Symptoms  Outcome: Ongoing, Progressing     Problem: Oral Intake Inadequate (Acute Kidney Injury/Impairment)  Goal: Optimal Nutrition Intake  Outcome: Ongoing, Progressing     Problem: Fall Injury Risk  Goal: Absence of Fall and Fall-Related Injury  Outcome: Ongoing, Progressing

## 2022-08-29 NOTE — SUBJECTIVE & OBJECTIVE
Interval History: no events overnight. Remains on 4 L of oxygen. Continues on duo nebs.     Pathology from right sided chest area resulted as squamous cell carcinoma. His CT Chest showed pulmonary nodules. Overall picture consistent with disease progression.     Discussed with patient and family at bedside. He's currently not a candidate for systemic therapy. Planned for discharge to SNF and follow up with his primary oncologist as OP.     Oncology Treatment Plan:   OP CARBOPLATIN WEEKLY    Medications:  Continuous Infusions:  Scheduled Meds:   acyclovir  200 mg Oral BID    albuterol-ipratropium  3 mL Nebulization Q6H    amiodarone  200 mg Oral Daily    atorvastatin  40 mg Oral QHS    cefTRIAXone (ROCEPHIN) IVPB  1 g Intravenous Q24H    enoxaparin  150 mg Subcutaneous Q12H    furosemide  40 mg Oral Daily    gabapentin  200 mg Oral Daily    melatonin  9 mg Oral QHS    metoprolol succinate  25 mg Oral Daily    oxybutynin  5 mg Oral QHS    pantoprazole  40 mg Oral Daily    polyethylene glycol  17 g Oral BID    tamsulosin  1 capsule Oral QHS    vancomycin  125 mg Oral Q12H    vancomycin (VANCOCIN) IVPB  750 mg Intravenous Q24H    venlafaxine  75 mg Oral QHS     PRN Meds:sodium chloride, acetaminophen, aluminum-magnesium hydroxide-simethicone, artificial tears, dextrose 10%, dextrose 10%, glucagon (human recombinant), glucose, glucose, HYDROcodone-acetaminophen, naloxone, ondansetron, promethazine, senna-docusate 8.6-50 mg, simethicone, sodium chloride 0.9%, Pharmacy to dose Vancomycin consult **AND** vancomycin - pharmacy to dose     Review of Systems   Constitutional:  Negative for chills, diaphoresis, fatigue and fever.   HENT:  Negative for congestion, rhinorrhea, sore throat and trouble swallowing.    Respiratory:  Negative for cough, chest tightness, shortness of breath and wheezing.    Cardiovascular:  Negative for chest pain, palpitations and leg swelling.   Gastrointestinal:  Negative for abdominal distention,  abdominal pain, blood in stool, diarrhea, nausea and vomiting.   Genitourinary:  Negative for difficulty urinating, dysuria, flank pain and hematuria.   Musculoskeletal:  Negative for arthralgias, back pain and neck pain.   Skin:  Negative for rash and wound.   Neurological:  Negative for dizziness, syncope, weakness, light-headedness, numbness and headaches.   Objective:     Vital Signs (Most Recent):  Temp: 98.1 °F (36.7 °C) (08/29/22 0820)  Pulse: 84 (08/29/22 0820)  Resp: 18 (08/29/22 0820)  BP: (!) 121/53 (08/29/22 0820)  SpO2: 98 % (08/29/22 0820) Vital Signs (24h Range):  Temp:  [97.7 °F (36.5 °C)-98.1 °F (36.7 °C)] 98.1 °F (36.7 °C)  Pulse:  [75-89] 84  Resp:  [18-23] 18  SpO2:  [93 %-100 %] 98 %  BP: (110-174)/(50-76) 121/53     Weight: (!) 141.5 kg (311 lb 15.2 oz)  Body mass index is 41.16 kg/m².  Body surface area is 2.7 meters squared.      Intake/Output Summary (Last 24 hours) at 8/29/2022 1348  Last data filed at 8/29/2022 0600  Gross per 24 hour   Intake 580 ml   Output 1300 ml   Net -720 ml         Physical Exam  Constitutional:       General: He is not in acute distress.     Appearance: He is not ill-appearing.   HENT:      Head: Normocephalic and atraumatic.      Mouth/Throat:      Mouth: Mucous membranes are moist.      Pharynx: Oropharynx is clear.   Eyes:      Extraocular Movements: Extraocular movements intact.      Pupils: Pupils are equal, round, and reactive to light.   Neck:      Comments: Right neck erythema  Cardiovascular:      Rate and Rhythm: Normal rate and regular rhythm.      Pulses: Normal pulses.      Heart sounds: Normal heart sounds.   Pulmonary:      Effort: Pulmonary effort is normal. No respiratory distress.      Breath sounds: No wheezing.   Abdominal:      General: Abdomen is flat. Bowel sounds are normal.      Palpations: Abdomen is soft.   Musculoskeletal:      Cervical back: Neck supple.      Right lower leg: Edema present.      Left lower leg: Edema present.   Skin:      General: Skin is warm and dry.      Capillary Refill: Capillary refill takes less than 2 seconds.      Comments: Incision from  port removal that is CDI with  improvement in erythema. No active bleeding.    Neurological:      General: No focal deficit present.      Mental Status: He is alert and oriented to person, place, and time. Mental status is at baseline.   Psychiatric:         Mood and Affect: Mood normal.         Behavior: Behavior normal.         Thought Content: Thought content normal.         Judgment: Judgment normal.       Significant Labs:   Recent Lab Results         08/29/22  0803        Albumin 2.6       Alkaline Phosphatase 122       ALT 21       Anion Gap 7       AST 23       Baso # 0.04       Basophil % 0.6       BILIRUBIN TOTAL 0.7  Comment: For infants and newborns, interpretation of results should be based  on gestational age, weight and in agreement with clinical  observations.    Premature Infant recommended reference ranges:  Up to 24 hours.............<8.0 mg/dL  Up to 48 hours............<12.0 mg/dL  3-5 days..................<15.0 mg/dL  6-29 days.................<15.0 mg/dL         BUN 14       Calcium 8.8       Chloride 96       CO2 37       Creatinine 1.4       Differential Method Automated       eGFR 52.4       Eos # 0.1       Eosinophil % 1.7       Glucose 97       Gran # (ANC) 5.4       Gran % 76.3       Hematocrit 22.6       Hemoglobin 7.7       Immature Grans (Abs) 0.03  Comment: Mild elevation in immature granulocytes is non specific and   can be seen in a variety of conditions including stress response,   acute inflammation, trauma and pregnancy. Correlation with other   laboratory and clinical findings is essential.         Immature Granulocytes 0.4       Lymph # 0.7       Lymph % 10.2       Magnesium 1.7       MCH 34.2       MCHC 34.1              Mono # 0.8       Mono % 10.8       MPV 10.1       nRBC 0       Platelets 139       Potassium 3.4       PROTEIN TOTAL 5.7        RBC 2.25       RDW 18.0       Sodium 140       WBC 7.05               Diagnostic Results:  I have reviewed all pertinent imaging results/findings within the past 24 hours.

## 2022-08-29 NOTE — ASSESSMENT & PLAN NOTE
-- Squamous cell carcinoma of the right head and neck s/p radical neck dissection and chemoradiation with carboplatin, completed 8/1/22. Follows with Dr. Hargrove.     bx of the right sided chest area results as squamous cell carcinoma. Ct chest showed pulmonary nodules and CT neck showed soft tissue mass at site of previous malignancy. Overall picture consistent with disease progression.    Plan for discharge to SNF to improve performance status and follow up with oncology as outpatient

## 2022-08-29 NOTE — PROGRESS NOTES
Reji Hemphill - Oncology ([facility])  Infectious Disease  Progress Note    Patient Name: Janusz Montgomery Jr.  MRN: [id number]  Admission Date: 8/23/2022  Length of Stay: 5 days  Attending Physician: Merly Romero MD  Primary Care Provider: Miguelina Weathers MD    Isolation Status: No active isolations  Assessment/Plan:      * Infection of venous access port  77M hx squamous cell H/N cancer s/p radical neck dissection and chemoradiation who presents w/ possible port infection. R chest port w/ overlying erythema and new nodular skin lesions present. Nodular lesions are atypical, and concerning for malignancy vs. Atypical infection (nocardia, fungal, mycobacterial). Blood cx NGTD. Chest US w/ fluid collection surrounding port. Biopsy + squamous cell carcinoma    Recommendations:  - core needle biopsy + squamous cell carcinoma  - transition abx to PO doxy and keflex to complete 14d course  - continue PO vancomycin 125mg BID while on antibiotics to prevent c. Diff recurrence    Will follow      Anticipated Disposition: TBD    Thank you for your consult. I will sign off. Please contact us if you have any additional questions.    Sharon Santana DO  Transplant Infectious Disease    Time: 35 minutes   50% of time spent on face-to-face counseling and coordination of care. Counseling included review of test results, diagnosis, and treatment plan with patient and/or family.        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 Completed chemoradiation therapy on 8/1/22. Pt endorses some erythema to R side of neck that he attributed to radiation treatments, however states this progressed to involve the area overlying his port, with sudden development of tender nodular lesion on chest. He denies any fevers at home. Also endorses  increased redness of R>L lower extremities w/ increased swelling and tenderness. Of note, recently completed a 7 day course of PO vancomycin for c. Diff infection, not currently complaining of diarrhea. Blood cultures obtained, and patient has been started on vanc and ceftriaxone for possible port infection. US of chest shows fluid collection surrounding port. ID consulted for assistance.     Interval History: no events overnight, breathing slightly better today, discussed biopsy results     Review of Systems   Constitutional:  Positive for appetite change. Negative for activity change, chills and fever.   HENT:  Negative for congestion, dental problem, ear pain and rhinorrhea.    Eyes:  Negative for pain and discharge.   Respiratory:  Positive for shortness of breath. Negative for cough.    Cardiovascular:  Negative for chest pain and leg swelling.   Gastrointestinal:  Negative for abdominal distention, abdominal pain, constipation, diarrhea, nausea and vomiting.   Genitourinary:  Negative for difficulty urinating and dysuria.   Musculoskeletal:  Negative for arthralgias, back pain and joint swelling.   Skin:  Positive for color change. Negative for rash and wound.        L heel chronic wound   Allergic/Immunologic: Positive for immunocompromised state.   Neurological:  Negative for dizziness, light-headedness and headaches.   Psychiatric/Behavioral:  Negative for behavioral problems and confusion.    Objective:     Vital Signs (Most Recent):  Temp: 98 °F (36.7 °C) (08/29/22 1230)  Pulse: 82 (08/29/22 1418)  Resp: 18 (08/29/22 1432)  BP: (!) 123/58 (08/29/22 1230)  SpO2: 99 % (08/29/22 1418)   Vital Signs (24h Range):  Temp:  [97.7 °F (36.5 °C)-98.1 °F (36.7 °C)] 98 °F (36.7 °C)  Pulse:  [75-89] 82  Resp:  [18-23] 18  SpO2:  [93 %-100 %] 99 %  BP: (110-174)/(50-76) 123/58     Weight: (!) 141.5 kg (311 lb 15.2 oz)  Body mass index is 41.16 kg/m².    Estimated Creatinine Clearance: 67.4 mL/min (based on SCr of 1.4  mg/dL).    Physical Exam  Constitutional:       General: He is not in acute distress.     Appearance: Normal appearance. He is well-developed. He is not ill-appearing or diaphoretic.   HENT:      Head: Normocephalic and atraumatic.      Right Ear: External ear normal.      Left Ear: External ear normal.      Nose: Nose normal.   Eyes:      General: No scleral icterus.        Right eye: No discharge.         Left eye: No discharge.      Extraocular Movements: Extraocular movements intact.      Conjunctiva/sclera: Conjunctivae normal.   Neck:      Comments: R neck erythema   Pulmonary:      Effort: Pulmonary effort is normal. No respiratory distress.      Breath sounds: No stridor.   Musculoskeletal:      Right lower leg: Edema present.      Left lower leg: Edema present.      Comments: B/l LE erythema R>L, warmth and tenderness   Skin:     General: Skin is dry.      Coloration: Skin is not jaundiced or pale.      Findings: Erythema and lesion present.      Comments: Erythema of R chest overlying port  No fluctuance  Atypical appearing nodular skin lesion present  R leg erythema resolved   Neurological:      General: No focal deficit present.      Mental Status: He is alert and oriented to person, place, and time. Mental status is at baseline.   Psychiatric:         Mood and Affect: Mood normal.         Behavior: Behavior normal.         Thought Content: Thought content normal.         Judgment: Judgment normal.       Significant Labs: CBC:   Recent Labs   Lab 08/28/22  0516 08/29/22  0803   WBC 6.04 7.05   HGB 6.9* 7.7*   HCT 20.1* 22.6*   * 139*       CMP:   Recent Labs   Lab 08/28/22  0516 08/29/22  0803    140   K 3.6 3.4*   CL 97 96   CO2 36* 37*   GLU 95 97   BUN 14 14   CREATININE 1.4 1.4   CALCIUM 8.7 8.8   PROT 5.8* 5.7*   ALBUMIN 2.7* 2.6*   BILITOT 0.9 0.7   ALKPHOS 133 122   AST 22 23   ALT 19 21   ANIONGAP 8 7*       Microbiology Results (last 7 days)       Procedure Component Value Units  Date/Time    IV catheter culture [[id number]] Collected: 08/26/22 1603    Order Status: Completed Specimen: Abscess Updated: 08/29/22 0828     Aerobic Culture - Cath tip No growth    Blood culture [[id number]] Collected: 08/24/22 0113    Order Status: Completed Specimen: Blood from Peripheral, Lower Arm, Right Updated: 08/29/22 0612     Blood Culture, Routine No growth after 5 days.    Blood culture [[id number]] Collected: 08/24/22 0112    Order Status: Completed Specimen: Blood from Peripheral, Hand, Right Updated: 08/29/22 0612     Blood Culture, Routine No growth after 5 days.    Tissue culture [[id number]] Collected: 08/26/22 1609    Order Status: Canceled Specimen: Tissue from Chest, Right     Direct AFB stain [[id number]] Collected: 08/26/22 1607    Order Status: Canceled Specimen: Abscess from Chest, Right     Modified Acid Fast Stain For Nocardia [[id number]] Collected: 08/26/22 1607    Order Status: Canceled Specimen: Abscess from Chest, Right     AFB Culture & Smear [[id number]] Collected: 08/26/22 1607    Order Status: Canceled Specimen: Abscess from Chest, Right     Culture, Anaerobe [[id number]] Collected: 08/26/22 1607    Order Status: Canceled Specimen: Abscess from Chest, Right     Fungus culture [[id number]] Collected: 08/26/22 1607    Order Status: Canceled Specimen: Abscess from Chest, Right     Tissue culture [[id number]]     Order Status: Canceled Specimen: Tissue from Chest, Right     Tissue culture [[id number]] Collected: 08/26/22 1603    Order Status: Canceled Specimen: Abscess from Chest, Right     Gram stain [[id number]]     Order Status: Sent Specimen: Chest     Culture, Anaerobe [[id number]]     Order Status: Canceled Specimen: Chest     Aerobic culture [[id number]]     Order Status: Sent Specimen: Chest     Fungus culture [[id number]]     Order Status: Canceled Specimen: Wound from Chest, Right     AFB Culture & Smear [[id number]]     Order Status: Canceled Specimen: Skin from Chest, Right              Significant Imaging: I have reviewed all pertinent imaging results/findings within the past 24 hours.

## 2022-08-29 NOTE — PLAN OF CARE
Patient in bed awake and alert, no signs of distress noted, patient complains of discomfort to right foot, norco administered, tolerated well, able to reposition self in bed, tolerates scheduled meds well by mouth, swelling to lower extremities shows improvement, will continue to monitor.    Problem: Adult Inpatient Plan of Care  Goal: Plan of Care Review  Outcome: Ongoing, Progressing  Flowsheets (Taken 8/29/2022 1733)  Plan of Care Reviewed With:   patient   daughter  Goal: Absence of Hospital-Acquired Illness or Injury  Intervention: Identify and Manage Fall Risk  Flowsheets (Taken 8/29/2022 1733)  Safety Promotion/Fall Prevention:   assistive device/personal item within reach   side rails raised x 3   medications reviewed  Goal: Optimal Comfort and Wellbeing  Outcome: Ongoing, Progressing     Problem: Infection  Goal: Absence of Infection Signs and Symptoms  Outcome: Ongoing, Progressing  Intervention: Prevent or Manage Infection  Flowsheets (Taken 8/29/2022 1733)  Isolation Precautions: precautions maintained

## 2022-08-29 NOTE — ASSESSMENT & PLAN NOTE
-- Continue home amiodarone 200 mg daily, metoprolol succinate 25 mg daily  -- F/u with his cardiologist (Dr. Sandhu) as scheduled

## 2022-08-29 NOTE — ASSESSMENT & PLAN NOTE
77M hx squamous cell H/N cancer s/p radical neck dissection and chemoradiation who presents w/ possible port infection. R chest port w/ overlying erythema and new nodular skin lesions present. Nodular lesions are atypical, and concerning for malignancy vs. Atypical infection (nocardia, fungal, mycobacterial). Blood cx NGTD. Chest US w/ fluid collection surrounding port. Biopsy + squamous cell carcinoma    Recommendations:  - core needle biopsy + squamous cell carcinoma  - transition abx to PO doxy and keflex to complete 14d course  - continue PO vancomycin 125mg BID while on antibiotics to prevent c. Diff recurrence    Will follow

## 2022-08-29 NOTE — PROGRESS NOTES
Reji Hemphill - Oncology ([facility])  Hematology/Oncology  Progress Note    Patient Name: Janusz Montgomery Jr.  Admission Date: 8/23/2022  Hospital Length of Stay: 5 days  Code Status: Full Code     Subjective:     HPI:  Janusz Montgomery is a 77 y.o. 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 Follows with Dr. Hargrove. Completed chemoradiation therapy on 8/1/22. Reports some redness to right neck which he attributes to radiation. However, has since developed redness, swelling, and tenderness overlying right chest port concerning for possible infection over last 2-3 days. Denies fever or chills. Increasing edema in extremities worse in RUE over last 4-5 days and generalized weakness causing difficulty ambulating. Recently hospitalized with dehydration, NAT, weakness, C dif. Completed vancomycin PO 7 days and diarrhea resolved. Rehydrated and concern that he then became overloaded. Cr uptrending on discharge. Recently saw nephro 8/22 for hospital f/u, noted worsening Cr baseline and increased Lasix to 40 mg daily.      Interval History: no events overnight. Remains on 4 L of oxygen. Continues on duo nebs.     Pathology from right sided chest area resulted as squamous cell carcinoma. His CT Chest showed pulmonary nodules. Overall picture consistent with disease progression.     Discussed with patient and family at bedside. He's currently not a candidate for systemic therapy. Planned for discharge to SNF and follow up with his primary oncologist as OP.     Oncology Treatment Plan:   OP CARBOPLATIN WEEKLY    Medications:  Continuous Infusions:  Scheduled Meds:   acyclovir  200 mg Oral BID    albuterol-ipratropium  3 mL Nebulization Q6H    amiodarone  200 mg Oral Daily    atorvastatin  40 mg Oral QHS     cefTRIAXone (ROCEPHIN) IVPB  1 g Intravenous Q24H    enoxaparin  150 mg Subcutaneous Q12H    furosemide  40 mg Oral Daily    gabapentin  200 mg Oral Daily    melatonin  9 mg Oral QHS    metoprolol succinate  25 mg Oral Daily    oxybutynin  5 mg Oral QHS    pantoprazole  40 mg Oral Daily    polyethylene glycol  17 g Oral BID    tamsulosin  1 capsule Oral QHS    vancomycin  125 mg Oral Q12H    vancomycin (VANCOCIN) IVPB  750 mg Intravenous Q24H    venlafaxine  75 mg Oral QHS     PRN Meds:sodium chloride, acetaminophen, aluminum-magnesium hydroxide-simethicone, artificial tears, dextrose 10%, dextrose 10%, glucagon (human recombinant), glucose, glucose, HYDROcodone-acetaminophen, naloxone, ondansetron, promethazine, senna-docusate 8.6-50 mg, simethicone, sodium chloride 0.9%, Pharmacy to dose Vancomycin consult **AND** vancomycin - pharmacy to dose     Review of Systems   Constitutional:  Negative for chills, diaphoresis, fatigue and fever.   HENT:  Negative for congestion, rhinorrhea, sore throat and trouble swallowing.    Respiratory:  Negative for cough, chest tightness, shortness of breath and wheezing.    Cardiovascular:  Negative for chest pain, palpitations and leg swelling.   Gastrointestinal:  Negative for abdominal distention, abdominal pain, blood in stool, diarrhea, nausea and vomiting.   Genitourinary:  Negative for difficulty urinating, dysuria, flank pain and hematuria.   Musculoskeletal:  Negative for arthralgias, back pain and neck pain.   Skin:  Negative for rash and wound.   Neurological:  Negative for dizziness, syncope, weakness, light-headedness, numbness and headaches.   Objective:     Vital Signs (Most Recent):  Temp: 98.1 °F (36.7 °C) (08/29/22 0820)  Pulse: 84 (08/29/22 0820)  Resp: 18 (08/29/22 0820)  BP: (!) 121/53 (08/29/22 0820)  SpO2: 98 % (08/29/22 0820) Vital Signs (24h Range):  Temp:  [97.7 °F (36.5 °C)-98.1 °F (36.7 °C)] 98.1 °F (36.7 °C)  Pulse:  [75-89] 84  Resp:   [18-23] 18  SpO2:  [93 %-100 %] 98 %  BP: (110-174)/(50-76) 121/53     Weight: (!) 141.5 kg (311 lb 15.2 oz)  Body mass index is 41.16 kg/m².  Body surface area is 2.7 meters squared.      Intake/Output Summary (Last 24 hours) at 8/29/2022 1348  Last data filed at 8/29/2022 0600  Gross per 24 hour   Intake 580 ml   Output 1300 ml   Net -720 ml         Physical Exam  Constitutional:       General: He is not in acute distress.     Appearance: He is not ill-appearing.   HENT:      Head: Normocephalic and atraumatic.      Mouth/Throat:      Mouth: Mucous membranes are moist.      Pharynx: Oropharynx is clear.   Eyes:      Extraocular Movements: Extraocular movements intact.      Pupils: Pupils are equal, round, and reactive to light.   Neck:      Comments: Right neck erythema  Cardiovascular:      Rate and Rhythm: Normal rate and regular rhythm.      Pulses: Normal pulses.      Heart sounds: Normal heart sounds.   Pulmonary:      Effort: Pulmonary effort is normal. No respiratory distress.      Breath sounds: No wheezing.   Abdominal:      General: Abdomen is flat. Bowel sounds are normal.      Palpations: Abdomen is soft.   Musculoskeletal:      Cervical back: Neck supple.      Right lower leg: Edema present.      Left lower leg: Edema present.   Skin:     General: Skin is warm and dry.      Capillary Refill: Capillary refill takes less than 2 seconds.      Comments: Incision from  port removal that is CDI with  improvement in erythema. No active bleeding.    Neurological:      General: No focal deficit present.      Mental Status: He is alert and oriented to person, place, and time. Mental status is at baseline.   Psychiatric:         Mood and Affect: Mood normal.         Behavior: Behavior normal.         Thought Content: Thought content normal.         Judgment: Judgment normal.       Significant Labs:   Recent Lab Results         08/29/22  0803        Albumin 2.6       Alkaline Phosphatase 122       ALT 21        Anion Gap 7       AST 23       Baso # 0.04       Basophil % 0.6       BILIRUBIN TOTAL 0.7  Comment: For infants and newborns, interpretation of results should be based  on gestational age, weight and in agreement with clinical  observations.    Premature Infant recommended reference ranges:  Up to 24 hours.............<8.0 mg/dL  Up to 48 hours............<12.0 mg/dL  3-5 days..................<15.0 mg/dL  6-29 days.................<15.0 mg/dL         BUN 14       Calcium 8.8       Chloride 96       CO2 37       Creatinine 1.4       Differential Method Automated       eGFR 52.4       Eos # 0.1       Eosinophil % 1.7       Glucose 97       Gran # (ANC) 5.4       Gran % 76.3       Hematocrit 22.6       Hemoglobin 7.7       Immature Grans (Abs) 0.03  Comment: Mild elevation in immature granulocytes is non specific and   can be seen in a variety of conditions including stress response,   acute inflammation, trauma and pregnancy. Correlation with other   laboratory and clinical findings is essential.         Immature Granulocytes 0.4       Lymph # 0.7       Lymph % 10.2       Magnesium 1.7       MCH 34.2       MCHC 34.1              Mono # 0.8       Mono % 10.8       MPV 10.1       nRBC 0       Platelets 139       Potassium 3.4       PROTEIN TOTAL 5.7       RBC 2.25       RDW 18.0       Sodium 140       WBC 7.05               Diagnostic Results:  I have reviewed all pertinent imaging results/findings within the past 24 hours.    Assessment/Plan:     * Infection of venous access port  -- ID on board, cultures have been negative so far. Will transition abx to PO with augmentin and doxycycline for total of 14 days  -- Port removed by IR 8/26     Acute blood loss anemia  HGB 6.7  Antiplatelet/anticoagulation: Prev Eliquis, currently Lovenox 150mg BID     Plan:  - Transfuse 1 unit PRBC 8/28   - CBCs daily  - Transfuse with goal Hb > 7      Acute DVT (deep venous thrombosis)  - Lovenox resumed 8/28.   - monitor anti Xa  levels     Chronic respiratory failure with hypoxia  Patient with Hypoxic Respiratory failure which is Chronic.  he is on home oxygen at 4 LPM. Supplemental oxygen was provided and noted-  .   Signs/symptoms of respiratory failure include- lethargy. Contributing diagnoses includes - CHF, restrictive lung disease. Labs and images were reviewed. Patient Has not had a recent ABG. Will treat underlying causes and adjust management of respiratory failure as follows- Continue home 4L O2    - Duonebs PRN for mild wheezing on 8/28   - repeat CXR negative for acute findings     Head and neck cancer  -- Squamous cell carcinoma of the right head and neck s/p radical neck dissection and chemoradiation with carboplatin, completed 8/1/22. Follows with Dr. Hargrove.     bx of the right sided chest area results as squamous cell carcinoma. Ct chest showed pulmonary nodules and CT neck showed soft tissue mass at site of previous malignancy. Overall picture consistent with disease progression.    Plan for discharge to SNF to improve performance status and follow up with oncology as outpatient      Paroxysmal atrial fibrillation   -- Continue home amiodarone 200 mg daily, metoprolol succinate 25 mg daily  -- F/u with his cardiologist (Dr. Sandhu) as scheduled             Art Carvalho MD  Hematology/Oncology  [facility] - Oncology ([facility])

## 2022-08-29 NOTE — ASSESSMENT & PLAN NOTE
-Patient with progressive weakness and multiple recent falls at home  -His wife is no longer able to care for him at home  -PT/OT consulted and recommended HH, but given that he cannot return home will require SNF vs correction placement.

## 2022-08-29 NOTE — PROGRESS NOTES
Outpatient Care Management  Patient Not Qualified    Patient: Janusz Montgomery Jr.  MRN:  [id number]  Date of Service:  8/29/2022  Completed by:  Amie Dickey RN    Chief Complaint   Patient presents with    OPCM Chart Review    OPCM Enrollment Call       Patient Summary     Program:  OPCM High Risk  Qualification Status:  No  Reason Not Qualified:  Currently inpatient

## 2022-08-29 NOTE — DISCHARGE SUMMARY
"[facility] Medicine  Discharge Summary      Patient Name: Janusz Montgomery Jr.  MRN: [id number]  Patient Class: IP- Inpatient  Admission Date: 8/10/2022  Hospital Length of Stay: 6 days  Discharge Date and Time: 8/17/2022  6:15 PM  Attending Physician: No att. providers found   Discharging Provider: Joni Handley MD  Primary Care Provider: Miguelina Weathers MD      HPI: 77 y.o. male PMHx CHF EF 65%, CKD, BCC s/p chemo presents with moderate to severe generalized weakness and profuse diarrhea x 1 day. Patient states he could "barely get off the toilet." Symptoms associated with nausea and nonbloody emesis.  Patient has also noted some skin changes around his neck.  He sometimes has changes around his neck and chest after chemo. Last chemotherapy was Monday 1 week ago.  Wife states that he has been very difficult to care for secondary to his worsening weakness.  Rest of history limited by critical illness.     ED course: Presented tachycardic and afebrile. Lactic acid 4.2, WBC 4, Hg 9, Cr 1.7, Mg 1.5,  UA abnormal. Tn 0.028. EKG shows sinus tachycardia without STEMI. CXR NAD. Patient aas given 2 L of LR, and vancomycin. Blood cultures collected. Lactate improved to 2.8.  Tachycardia improved from the 130s to 115.      Goals of Care Treatment Preferences:  Code Status: Full Code    Living Will: Yes              Consults:   Consults (From admission, onward)        Status Ordering Provider     Inpatient consult to Social Work  Once        Provider:  (Not yet assigned)    Completed JONI HANDLEY     Inpatient consult to Social Work  Once        Provider:  (Not yet assigned)    Completed JONI HANDLEY     Inpatient consult to ENT  Once        Provider:  Etta Higgins MD    Completed RANDI GONZALEZ        Hospital Course By Problem:   * Sepsis  -Admitted to inpatient status  -On admit tachycardic with lactic acidosis and diarrhea.  No leukocytosis  -Noted abnormal UA but no urinary symptoms and " urine culture was negative  -Blood cultures negative  -C.diff Ag + but Toxin negative.  PCR resulted positive  -Source of sepsis was C.diff infection  -Stopped broad spectrum IV antibiotics and initiated oral vancomycin with good clinical improvement and resolution of diarrhea.  Stable for discharge home with HH today.  Will complete course of oral vancomycin.  Daughter has my cellphone number incase of any additional post-discharge needs.    Clostridium difficile infection  -Treatment as above.    Paroxysmal atrial fibrillation   -Patient with Paroxysmal (<7 days) atrial fibrillation which is controlled currently with Amiodarone.   -Patient is currently in sinus rhythm.[id number] Score: 3.   -Anticoagulation indicated. Anticoagulation done with apixaban    Weakness  -Patient with progressive weakness and multiple recent falls at home  -His wife is no longer able to care for him at home  -PT/OT consulted and recommended HH.  Family desired SNF placement which was not supported by PT/OT notes.  Options were detention NH placement or home health.  Family and patient declined detention placement and appealed discharge which ultimately was not supported.  Patient was discharged to home with     Acute renal failure superimposed on stage 3 chronic kidney disease  -Baseline Creat around 1.5.  -On admit Cr 2.3  -Avoid nephrotoxic agents and renally dose meds  -Patient is at least 5 L fluid positive since admit and Cr remains essentially stable  -Cr 2.6 on discharge  -Resume home oral lasix  -Follow up with nephrology - referral placed    Chronic respiratory failure with hypoxia  -Patient with chronic hypoxemic respiratory failure on home oxygen.  -Seems to be at baseline.  -Continue supplemental O2    CHARISMA on CPAP  -Continue Nightly CPAP    Squamous cell carcinoma of neck  -History and treatment reviewed  -Follows with Dr. Hargrove outpatient  -Noted last carboplatin 8/1  -Heme/onc consulted and input  appreciated    Pancytopenia  -Suspect thrombocytopenai and anemia reactive to his chemotherapy and malignancy  -No evidence of bleeding  -Not neutropenic  -Required 1 unit PRBC 8/15 with appropriate improvement in Hb  -Platelets improving and 123 at discharge    Morbid obesity  Body mass index is 42.67 kg/m². Morbid obesity complicates all aspects of disease management from diagnostic modalities to treatment. Weight loss encouraged and health benefits explained to patient.     Coronary artery disease involving native coronary artery of native heart without angina pectoris s/p RCA stent  -History noted  -Chest pain free.  -Continue plavix, metoprolol and statin.      Final Active Diagnoses:    Diagnosis Date Noted POA    PRINCIPAL PROBLEM:  Sepsis [A41.9] 03/16/2017 Yes    Clostridium difficile infection [A49.8] 08/16/2022 Yes    Paroxysmal atrial fibrillation  [I48.0] 07/06/2016 Yes    Weakness [R53.1]  Yes    Acute renal failure superimposed on stage 3 chronic kidney disease [N17.9, N18.30] 08/24/2020 Yes    Chronic respiratory failure with hypoxia [J96.11] 08/11/2022 Yes    CHARISMA on CPAP [G47.33, Z99.89]  Not Applicable    Squamous cell carcinoma of neck [C44.42] 08/11/2022 Yes    Pancytopenia [D61.818] 08/12/2022 Yes    Morbid obesity [E66.01] 08/14/2015 Yes    Coronary artery disease involving native coronary artery of native heart without angina pectoris s/p RCA stent [I25.10]  Yes      Problems Resolved During this Admission:    Diagnosis Date Noted Date Resolved POA    Abnormal urinalysis [R82.90] 08/11/2022 08/12/2022 Yes    Urinary tract infection without hematuria [N39.0]  08/12/2022 Yes       Discharged Condition: stable    Disposition: Home-Health Care c    Follow Up:   Follow-up Information     Miguelina Weathers MD Follow up in 1 week(s).    Specialties: Internal Medicine, Pediatrics  Contact information:  [address] [place] [address]  [place] [address] [phone]             Etta Higgins MD.  "Schedule an appointment as soon as possible for a visit in 1 week(s).    Specialty: Otolaryngology  Why: Clinic to contact patient to schedule appointment  Contact information:  [address] [facility] [address] [facility]  [place] [address]  [phone]             [facility] Follow up.    Why: Home Health  Contact information:  [address] [facility] [address]  [facility] [address]  [phone]           [facility] Follow up.    Specialty: DME Provider  Why: DME: Wheelchair  Contact information:  [address]  [place]  [facility] [address]  [phone]                       Patient Instructions:      WHEELCHAIR FOR HOME USE     Order Specific Question Answer Comments   Hours in W/C per day: 10    Type of Wheelchair: Standard    Size(Width): 22" Heavy duty   Leg Support: STD footrests    Lap Belt: Velcro    Accessories: Front brakes    Cushion: Basic    Height: 6' 1" (1.854 m)    Weight: 146.7 kg (323 lb 6.6 oz)    Does patient have medical equipment at home? CPAP    Does patient have medical equipment at home? bedside commode    Does patient have medical equipment at home? bath bench    Does patient have medical equipment at home? walker, rolling    Does patient have medical equipment at home? cane, straight    Does patient have medical equipment at home? oxygen    Length of need (1-99 months): 99    Please check all that apply: Caregiver is capable and willing to operate wheelchair safely.    Please check all that apply: Patient's upper body strength is sufficient for propulsion.      Ambulatory referral/consult to Outpatient Case Management   Referral Priority: Routine Referral Type: Consultation   Referral Reason: Specialty Services Required   Number of Visits Requested: 1     Ambulatory referral/consult to Nephrology   Standing Status: Future   Referral Priority: Routine Referral Type: Consultation   Referral Reason: Specialty Services Required   Requested Specialty: Nephrology   Number of Visits Requested: 1 "     Ambulatory referral/consult to [facility]   Standing Status: Future   Referral Priority: Routine Referral Type: Consultation   Referral Reason: Specialty Services Required   Number of Visits Requested: 1     Diet renal     Diet Cardiac     Notify your health care provider if you experience any of the following:  increased confusion or weakness     Notify your health care provider if you experience any of the following:  persistent dizziness, light-headedness, or visual disturbances     Notify your health care provider if you experience any of the following:  worsening rash     Notify your health care provider if you experience any of the following:  severe persistent headache     Notify your health care provider if you experience any of the following:  difficulty breathing or increased cough     Notify your health care provider if you experience any of the following:  severe uncontrolled pain     Notify your health care provider if you experience any of the following:  persistent nausea and vomiting or diarrhea     Notify your health care provider if you experience any of the following:  temperature >100.4     Activity as tolerated       Significant Diagnostic Studies: Labs:   BMP:   Recent Labs   Lab 08/28/22  0516 08/29/22  0803   GLU 95 97    140   K 3.6 3.4*   CL 97 96   CO2 36* 37*   BUN 14 14   CREATININE 1.4 1.4   CALCIUM 8.7 8.8   MG  --  1.7   , CMP   Recent Labs   Lab 08/28/22  0516 08/29/22  0803    140   K 3.6 3.4*   CL 97 96   CO2 36* 37*   GLU 95 97   BUN 14 14   CREATININE 1.4 1.4   CALCIUM 8.7 8.8   PROT 5.8* 5.7*   ALBUMIN 2.7* 2.6*   BILITOT 0.9 0.7   ALKPHOS 133 122   AST 22 23   ALT 19 21   ANIONGAP 8 7*   , CBC   Recent Labs   Lab 08/28/22  0516 08/29/22  0803   WBC 6.04 7.05   HGB 6.9* 7.7*   HCT 20.1* 22.6*   * 139*   , INR   Lab Results   Component Value Date    INR 1.1 08/26/2022    INR 1.3 (H) 08/24/2022    INR 1.0 06/15/2022   , Lipid  Panel   Lab Results   Component Value Date    CHOL 155 01/10/2022    HDL 32 (L) 01/10/2022    LDLCALC 96.0 01/10/2022    TRIG 135 01/10/2022    CHOLHDL 20.6 01/10/2022   , Troponin No results for input(s): TROPONINI in the last 168 hours. and A1C: No results for input(s): HGBA1C in the last 4320 hours.    Pending Diagnostic Studies:     None         Medications:  Reconciled Home Medications:      Medication List      CHANGE how you take these medications    atorvastatin 40 MG tablet  Commonly known as: LIPITOR  Take 1 tablet (40 mg total) by mouth once daily.  What changed: when to take this     clopidogreL 75 mg tablet  Commonly known as: PLAVIX  Take 1 tablet by mouth once daily  What changed: how much to take     gabapentin 100 MG capsule  Commonly known as: NEURONTIN  2 capsules once daily  What changed: Another medication with the same name was removed. Continue taking this medication, and follow the directions you see here.     metoprolol succinate 25 MG 24 hr tablet  Commonly known as: TOPROL-XL  Take 1 tablet by mouth once daily  What changed:   · how much to take  · when to take this     sucralfate 100 mg/mL suspension  Commonly known as: CARAFATE  Take 10 mLs (1 g total) by mouth 4 (four) times daily before meals and nightly.  What changed:   · how much to take  · how to take this  · when to take this     venlafaxine 75 MG tablet  Commonly known as: EFFEXOR  Take 1 tablet (75 mg total) by mouth every evening.  What changed:   · how much to take  · when to take this     vitamin D 1000 units Tab  Commonly known as: VITAMIN D3  Take 1 tablet (1,000 Units total) by mouth once daily.  What changed: how much to take        CONTINUE taking these medications    acyclovir 800 MG Tab  Commonly known as: ZOVIRAX  Take 200 mg by mouth 2 (two) times daily.     amiodarone 200 MG Tab  Commonly known as: PACERONE  Take 1 tablet (200 mg total) by mouth once daily.     artificial tears 0.5 % ophthalmic solution  Commonly  known as: ISOPTO TEARS  Place 1 drop into both eyes as needed.     dexAMETHasone 4 MG Tab  Commonly known as: DECADRON  4mg (1 tablet) by mouth twice a day for two days starting the day after chemo.  Repeat after each dose of chemo.     diphenhydrAMINE-aluminum-magnesium hydroxide-simethicone-LIDOcaine HCl 2%  Swish and spit 15 mLs every 4 (four) hours as needed.     ELIQUIS 5 mg Tab  Generic drug: apixaban  Take 1 tablet by mouth twice daily     hydrocodone-apap 7.5-325 MG/15 ML oral solution  Commonly known as: HYCET  Take 15 mLs by mouth every 8 (eight) hours as needed for Pain.     LIDOcaine-prilocaine cream  Commonly known as: EMLA  Apply generously to port site 30-60 min prior to chemo and then cover with saran wrap.     melatonin 5 mg  Commonly known as: MELATIN  Take 10 mg by mouth nightly.     MUGARD Soln  Generic drug: gly-carb h-poly a-pot hydrox  by Mucous Membrane route.     nitroGLYCERIN 0.4 MG SL tablet  Commonly known as: NITROSTAT  Place 1 tablet (0.4 mg total) under the tongue every 5 (five) minutes as needed for Chest pain.     oxybutynin 5 MG Tr24  Commonly known as: DITROPAN-XL  Take 1 tablet (5 mg total) by mouth every evening.     pantoprazole 40 MG tablet  Commonly known as: PROTONIX  Take 1 tablet (40 mg total) by mouth once daily.     silver sulfADIAZINE 1% 1 % cream  Commonly known as: SILVADENE  Apply topically.     tamsulosin 0.4 mg Cap  Commonly known as: FLOMAX  Take 1 capsule (0.4 mg total) by mouth every evening.        STOP taking these medications    albuterol 90 mcg/actuation inhaler  Commonly known as: VENTOLIN HFA     furosemide 20 MG tablet  Commonly known as: LASIX     MUCINEX FAST-MAX SEVERE COLD ORAL     omeprazole 20 MG capsule  Commonly known as: PRILOSEC     ondansetron 8 MG tablet  Commonly known as: ZOFRAN     sildenafiL 100 MG tablet  Commonly known as: VIAGRA     traZODone 50 MG tablet  Commonly known as: DESYREL        ASK your doctor about these medications     vancomycin 125 mg/5 mL Soln  Take 5 mLs (125 mg total) by mouth every 6 (six) hours. for 7 days  Ask about: Should I take this medication?            Indwelling Lines/Drains at time of discharge:   Lines/Drains/Airways     None                 Time spent on the discharge of patient: 35 minutes         Al Handley MD  Department of Hospital Medicine  [facility]

## 2022-08-29 NOTE — PROGRESS NOTES
Sent referral packets to the following facilities for placement: [facility], [facility].

## 2022-08-29 NOTE — ASSESSMENT & PLAN NOTE
-- ID on board, cultures have been negative so far. Will transition abx to PO with augmentin and doxycycline for total of 14 days  -- Port removed by IR 8/26

## 2022-08-29 NOTE — SUBJECTIVE & OBJECTIVE
Interval History: no events overnight, breathing slightly better today, discussed biopsy results     Review of Systems   Constitutional:  Positive for appetite change. Negative for activity change, chills and fever.   HENT:  Negative for congestion, dental problem, ear pain and rhinorrhea.    Eyes:  Negative for pain and discharge.   Respiratory:  Positive for shortness of breath. Negative for cough.    Cardiovascular:  Negative for chest pain and leg swelling.   Gastrointestinal:  Negative for abdominal distention, abdominal pain, constipation, diarrhea, nausea and vomiting.   Genitourinary:  Negative for difficulty urinating and dysuria.   Musculoskeletal:  Negative for arthralgias, back pain and joint swelling.   Skin:  Positive for color change. Negative for rash and wound.        L heel chronic wound   Allergic/Immunologic: Positive for immunocompromised state.   Neurological:  Negative for dizziness, light-headedness and headaches.   Psychiatric/Behavioral:  Negative for behavioral problems and confusion.    Objective:     Vital Signs (Most Recent):  Temp: 98 °F (36.7 °C) (08/29/22 1230)  Pulse: 82 (08/29/22 1418)  Resp: 18 (08/29/22 1432)  BP: (!) 123/58 (08/29/22 1230)  SpO2: 99 % (08/29/22 1418)   Vital Signs (24h Range):  Temp:  [97.7 °F (36.5 °C)-98.1 °F (36.7 °C)] 98 °F (36.7 °C)  Pulse:  [75-89] 82  Resp:  [18-23] 18  SpO2:  [93 %-100 %] 99 %  BP: (110-174)/(50-76) 123/58     Weight: (!) 141.5 kg (311 lb 15.2 oz)  Body mass index is 41.16 kg/m².    Estimated Creatinine Clearance: 67.4 mL/min (based on SCr of 1.4 mg/dL).    Physical Exam  Constitutional:       General: He is not in acute distress.     Appearance: Normal appearance. He is well-developed. He is not ill-appearing or diaphoretic.   HENT:      Head: Normocephalic and atraumatic.      Right Ear: External ear normal.      Left Ear: External ear normal.      Nose: Nose normal.   Eyes:      General: No scleral icterus.        Right eye: No  discharge.         Left eye: No discharge.      Extraocular Movements: Extraocular movements intact.      Conjunctiva/sclera: Conjunctivae normal.   Neck:      Comments: R neck erythema   Pulmonary:      Effort: Pulmonary effort is normal. No respiratory distress.      Breath sounds: No stridor.   Musculoskeletal:      Right lower leg: Edema present.      Left lower leg: Edema present.      Comments: B/l LE erythema R>L, warmth and tenderness   Skin:     General: Skin is dry.      Coloration: Skin is not jaundiced or pale.      Findings: Erythema and lesion present.      Comments: Erythema of R chest overlying port  No fluctuance  Atypical appearing nodular skin lesion present  R leg erythema resolved   Neurological:      General: No focal deficit present.      Mental Status: He is alert and oriented to person, place, and time. Mental status is at baseline.   Psychiatric:         Mood and Affect: Mood normal.         Behavior: Behavior normal.         Thought Content: Thought content normal.         Judgment: Judgment normal.       Significant Labs: CBC:   Recent Labs   Lab 08/28/22  0516 08/29/22  0803   WBC 6.04 7.05   HGB 6.9* 7.7*   HCT 20.1* 22.6*   * 139*       CMP:   Recent Labs   Lab 08/28/22  0516 08/29/22  0803    140   K 3.6 3.4*   CL 97 96   CO2 36* 37*   GLU 95 97   BUN 14 14   CREATININE 1.4 1.4   CALCIUM 8.7 8.8   PROT 5.8* 5.7*   ALBUMIN 2.7* 2.6*   BILITOT 0.9 0.7   ALKPHOS 133 122   AST 22 23   ALT 19 21   ANIONGAP 8 7*       Microbiology Results (last 7 days)       Procedure Component Value Units Date/Time    IV catheter culture [[id number]] Collected: 08/26/22 1603    Order Status: Completed Specimen: Abscess Updated: 08/29/22 0828     Aerobic Culture - Cath tip No growth    Blood culture [[id number]] Collected: 08/24/22 0113    Order Status: Completed Specimen: Blood from Peripheral, Lower Arm, Right Updated: 08/29/22 0612     Blood Culture, Routine No growth after 5 days.    Blood  culture [[id number]] Collected: 08/24/22 0112    Order Status: Completed Specimen: Blood from Peripheral, Hand, Right Updated: 08/29/22 0612     Blood Culture, Routine No growth after 5 days.    Tissue culture [[id number]] Collected: 08/26/22 1609    Order Status: Canceled Specimen: Tissue from Chest, Right     Direct AFB stain [[id number]] Collected: 08/26/22 1607    Order Status: Canceled Specimen: Abscess from Chest, Right     Modified Acid Fast Stain For Nocardia [[id number]] Collected: 08/26/22 1607    Order Status: Canceled Specimen: Abscess from Chest, Right     AFB Culture & Smear [[id number]] Collected: 08/26/22 1607    Order Status: Canceled Specimen: Abscess from Chest, Right     Culture, Anaerobe [[id number]] Collected: 08/26/22 1607    Order Status: Canceled Specimen: Abscess from Chest, Right     Fungus culture [[id number]] Collected: 08/26/22 1607    Order Status: Canceled Specimen: Abscess from Chest, Right     Tissue culture [[id number]]     Order Status: Canceled Specimen: Tissue from Chest, Right     Tissue culture [[id number]] Collected: 08/26/22 1603    Order Status: Canceled Specimen: Abscess from Chest, Right     Gram stain [[id number]]     Order Status: Sent Specimen: Chest     Culture, Anaerobe [[id number]]     Order Status: Canceled Specimen: Chest     Aerobic culture [[id number]]     Order Status: Sent Specimen: Chest     Fungus culture [[id number]]     Order Status: Canceled Specimen: Wound from Chest, Right     AFB Culture & Smear [[id number]]     Order Status: Canceled Specimen: Skin from Chest, Right             Significant Imaging: I have reviewed all pertinent imaging results/findings within the past 24 hours.

## 2022-08-30 PROBLEM — D62 ACUTE BLOOD LOSS ANEMIA: Status: RESOLVED | Noted: 2022-01-01 | Resolved: 2022-01-01

## 2022-08-30 NOTE — ADDENDUM NOTE
Addended by: MAXIMINO REECE on: 8/30/2022 04:51 PM     Modules accepted: Orders, Level of Service

## 2022-08-30 NOTE — PLAN OF CARE
Problem: Physical Therapy  Goal: Physical Therapy Goal  Description: Goals to be met by: 2022    Patient will increase functional independence with mobility by performin. Supine to sit with Mod-Phillips- MET 2022  2. Sit to supine with Phillips  3. Sit to stand transfer with Phillips and RW.  4. Gait  x 20' feet with Contact Guard Assistance using Rolling Walker. MET- 2022  5. Lower extremity exercise program x10 reps per handout, with independence  6. Pt received gait training x 50' with CGA using RW with O2.    Outcome: Ongoing, Progressing   PT updated goals for content and are appropriate.

## 2022-08-30 NOTE — PLAN OF CARE
Patient A/Ox4 during shift. Patient on 4L NC which is home o2 amount. No complications throughout the night. Personal belongings and call light remained in reach. Bed in lowest position and locked throughout shift.     Problem: Adult Inpatient Plan of Care  Goal: Optimal Comfort and Wellbeing  Outcome: Ongoing, Progressing     Problem: Infection  Goal: Absence of Infection Signs and Symptoms  Outcome: Ongoing, Progressing     Problem: Fall Injury Risk  Goal: Absence of Fall and Fall-Related Injury  Outcome: Ongoing, Progressing

## 2022-08-30 NOTE — ASSESSMENT & PLAN NOTE
-- ID on board, cultures have been negative so far. abx transitioned to PO with keflex and doxycycline for total of 14 days  -- Port removed by IR 8/26

## 2022-08-30 NOTE — PT/OT/SLP PROGRESS
Physical Therapy Treatment    Patient Name:  Janusz Montgomery Jr.   MRN:  [id number]    Recommendations:     Discharge Recommendations:  nursing facility, skilled   Discharge Equipment Recommendations:  (will determine closer to discharge)   Barriers to discharge: Decreased caregiver support    Assessment:     Janusz Montgomery Jr. is a 77 y.o. male admitted with a medical diagnosis of Infection of venous access port.  He presents with the following impairments/functional limitations:  weakness, impaired endurance, impaired functional mobility, gait instability, decreased lower extremity function. Pt tolerated PT better due to being able to begin gait training. Pt is still appropriated for skilled PT to allow return to prior function. Pt will need SNF placement to maximize rehab potential.    Rehab Prognosis: Good; patient would benefit from acute skilled PT services to address these deficits and reach maximum level of function.    Recent Surgery: * No surgery found *      Plan:     During this hospitalization, patient to be seen 4 x/week to address the identified rehab impairments via gait training, therapeutic activities, therapeutic exercises and progress toward the following goals:    Plan of Care Expires:  09/25/22    Subjective     Chief Complaint: c/o shortness of breathe and weakness  Patient/Family Comments/goals: pt wants to be able to walk farther  Pain/Comfort:  Pain Rating 1: 0/10  Pain Rating Post-Intervention 1: 0/10      Objective:     Communicated with nurse prior to session.  Patient found supine with oxygen (hep lock IV) upon PT entry to room.     General Precautions: Standard, fall   Orthopedic Precautions:N/A   Braces:    Respiratory Status: Nasal cannula, flow 4 L/min     Functional Mobility:  Bed Mobility:   pt received verbal cues for hand placement and sequencing.  Rolling Left:  modified independence  Supine to Sit: modified independence    Transfers:     Sit to Stand:  stand by assistance with  rolling walker    Gait: 20'x2 with rest period due to SOB with RW and O2 and CGA. Pt needed verbal cues for safe walker placement for stand to sit.      AM-PAC 6 CLICK MOBILITY  Turning over in bed (including adjusting bedclothes, sheets and blankets)?: 4  Sitting down on and standing up from a chair with arms (e.g., wheelchair, bedside commode, etc.): 3  Moving from lying on back to sitting on the side of the bed?: 4  Moving to and from a bed to a chair (including a wheelchair)?: 3  Need to walk in hospital room?: 3  Climbing 3-5 steps with a railing?: 1  Basic Mobility Total Score: 18       Therapeutic Activities and Exercises:   Pt and wife were verbally educated by PT about POC and verbally expressed understanding.    Pt performed AROM BLE LAQ, toe and heel raises x 10 reps.    Patient left up in chair with all lines intact and call button in reach..    GOALS:   Multidisciplinary Problems       Physical Therapy Goals          Problem: Physical Therapy    Goal Priority Disciplines Outcome Goal Variances Interventions   Physical Therapy Goal     PT, PT/OT Ongoing, Progressing     Description: Goals to be met by: 2022    Patient will increase functional independence with mobility by performin. Supine to sit with Mod-[place]- MET 2022  2. Sit to supine with [place]  3. Sit to stand transfer with [place] and RW.  4. Gait  x 20' feet with Contact Guard Assistance using Rolling Walker. MET- 2022  5. Lower extremity exercise program x10 reps per handout, with independence  6. Pt received gait training x 50' with CGA using RW with O2.                         Time Tracking:     PT Received On: 22  PT Start Time: 901     PT Stop Time: 924  PT Total Time (min): 23 min     Billable Minutes: Gait Training 15 minutes and Therapeutic Exercise 8 minutes    Treatment Type: Treatment  PT/PTA: PT     PTA Visit Number: 0     2022   no

## 2022-08-30 NOTE — PROGRESS NOTES
"Please delete "ambulatory referral/consult to physical/occupational therapy" this consult was placed in error.  "

## 2022-08-30 NOTE — SUBJECTIVE & OBJECTIVE
Interval History: no events overnight. Remains on 4 L of oxygen. Tolerated PO abx without issues.     Pending SNF placement. Likely discharge tomorrow     Oncology Treatment Plan:   OP CARBOPLATIN WEEKLY    Medications:  Continuous Infusions:  Scheduled Meds:   acyclovir  200 mg Oral BID    albuterol-ipratropium  3 mL Nebulization Q6H    amiodarone  200 mg Oral Daily    atorvastatin  40 mg Oral QHS    cephALEXin  500 mg Oral Q6H    doxycycline  100 mg Oral Q12H    enoxaparin  120 mg Subcutaneous Q12H    furosemide  40 mg Oral Daily    gabapentin  200 mg Oral Daily    melatonin  9 mg Oral QHS    metoprolol succinate  25 mg Oral Daily    oxybutynin  5 mg Oral QHS    pantoprazole  40 mg Oral Daily    polyethylene glycol  17 g Oral BID    tamsulosin  1 capsule Oral QHS    vancomycin  125 mg Oral Q12H    venlafaxine  75 mg Oral QHS     PRN Meds:sodium chloride, acetaminophen, aluminum-magnesium hydroxide-simethicone, artificial tears, dextrose 10%, dextrose 10%, glucagon (human recombinant), glucose, glucose, HYDROcodone-acetaminophen, naloxone, ondansetron, promethazine, senna-docusate 8.6-50 mg, simethicone, sodium chloride 0.9%     Review of Systems   Constitutional:  Negative for chills, diaphoresis, fatigue and fever.   HENT:  Negative for congestion, rhinorrhea, sore throat and trouble swallowing.    Respiratory:  Negative for cough, chest tightness, shortness of breath and wheezing.    Cardiovascular:  Negative for chest pain, palpitations and leg swelling.   Gastrointestinal:  Negative for abdominal distention, abdominal pain, blood in stool, diarrhea, nausea and vomiting.   Genitourinary:  Negative for difficulty urinating, dysuria, flank pain and hematuria.   Musculoskeletal:  Negative for arthralgias, back pain and neck pain.   Skin:  Negative for rash and wound.   Neurological:  Negative for dizziness, syncope, weakness, light-headedness, numbness and headaches.   Objective:     Vital Signs (Most  Recent):  Temp: 98.4 °F (36.9 °C) (08/30/22 1558)  Pulse: 84 (08/30/22 1558)  Resp: 15 (08/30/22 1558)  BP: (!) 99/44 (08/30/22 1558)  SpO2: 99 % (08/30/22 1558) Vital Signs (24h Range):  Temp:  [98.2 °F (36.8 °C)-98.4 °F (36.9 °C)] 98.4 °F (36.9 °C)  Pulse:  [80-88] 84  Resp:  [15-22] 15  SpO2:  [94 %-99 %] 99 %  BP: ()/(44-84) 99/44     Weight: (!) 142.7 kg (314 lb 9.5 oz)  Body mass index is 41.51 kg/m².  Body surface area is 2.71 meters squared.      Intake/Output Summary (Last 24 hours) at 8/30/2022 1753  Last data filed at 8/30/2022 0800  Gross per 24 hour   Intake --   Output 812 ml   Net -812 ml         Physical Exam  Constitutional:       General: He is not in acute distress.     Appearance: He is not ill-appearing.   HENT:      Head: Normocephalic and atraumatic.      Mouth/Throat:      Mouth: Mucous membranes are moist.      Pharynx: Oropharynx is clear.   Eyes:      Extraocular Movements: Extraocular movements intact.      Pupils: Pupils are equal, round, and reactive to light.   Neck:      Comments: Right neck erythema  Cardiovascular:      Rate and Rhythm: Normal rate and regular rhythm.      Pulses: Normal pulses.      Heart sounds: Normal heart sounds.   Pulmonary:      Effort: Pulmonary effort is normal. No respiratory distress.      Breath sounds: No wheezing.   Abdominal:      General: Abdomen is flat. Bowel sounds are normal.      Palpations: Abdomen is soft.   Musculoskeletal:      Cervical back: Neck supple.      Right lower leg: Edema present.      Left lower leg: Edema present.   Skin:     General: Skin is warm and dry.      Capillary Refill: Capillary refill takes less than 2 seconds.      Comments: Incision from  port removal that is CDI with  improvement in erythema. No active bleeding.    Neurological:      General: No focal deficit present.      Mental Status: He is alert and oriented to person, place, and time. Mental status is at baseline.   Psychiatric:         Mood and Affect:  Mood normal.         Behavior: Behavior normal.         Thought Content: Thought content normal.         Judgment: Judgment normal.       Significant Labs:   Recent Lab Results         08/30/22  1025   08/30/22  0316        Albumin   2.8       Alkaline Phosphatase   131       ALT   24       Anion Gap   9       AST   23       Baso #   0.03       Basophil %   0.4       BILIRUBIN TOTAL   0.8  Comment: For infants and newborns, interpretation of results should be based  on gestational age, weight and in agreement with clinical  observations.    Premature Infant recommended reference ranges:  Up to 24 hours.............<8.0 mg/dL  Up to 48 hours............<12.0 mg/dL  3-5 days..................<15.0 mg/dL  6-29 days.................<15.0 mg/dL         BUN   18       Calcium   9.1       Chloride   94       CO2   38       Creatinine   1.3       Differential Method   Automated       eGFR   57.3       Eos #   0.1       Eosinophil %   1.5       Glucose   101       Gran # (ANC)   6.1       Gran %   78.0       Hematocrit   22.8       Hemoglobin   7.9       Immature Grans (Abs)   0.02  Comment: Mild elevation in immature granulocytes is non specific and   can be seen in a variety of conditions including stress response,   acute inflammation, trauma and pregnancy. Correlation with other   laboratory and clinical findings is essential.         Immature Granulocytes   0.3       Lymph #   0.7       Lymph %   9.1       Magnesium   1.7       MCH   34.2       MCHC   34.6       MCV   99       Mono #   0.8       Mono %   10.7       MPV   10.0       nRBC   0       Platelets   144       Potassium   3.8       PROTEIN TOTAL   6.1       RBC   2.31       RDW   17.5       SARS-CoV-2 RNA, Amplification, Qual Negative  Comment: This test utilizes isothermal nucleic acid amplification   technology to detect the SARS-CoV-2 RdRp nucleic acid segment.   The analytical sensitivity (limit of detection) is 125 genome   equivalents/mL.     A POSITIVE  result implies infection with the SARS-CoV-2 virus;  the patient is presumed to be contagious.    A NEGATIVE result means that SARS-CoV-2 nucleic acids are not  present above the limit of detection. A NEGATIVE result should be   treated as presumptive. It does not rule out the possibility of   COVID-19 and should not be the sole basis for treatment decisions.   If COVID-19 is strongly suspected based on clinical and exposure   history, re-testing using an alternate molecular assay should be   considered.       This test is only for use under the Food and Drug   Administration s Emergency Use Authorization (EUA).   Commercial kits are provided by [employer].   Performance characteristics of the EUA have been independently  verified by [facility] Department of  Pathology and Laboratory Medicine.   _________________________________________________________________  The ID NOW COVID-19 Letter of Authorization, along with the   authorized Fact Sheet for Healthcare Providers, the authorized Fact  Sheet for Patients, and authorized labeling are available on the FDA   website:  www.fda.gov/MedicalDevices/Safety/EmergencySituations/wvc776596.htm           Sodium   141       WBC   7.82               Diagnostic Results:  I have reviewed all pertinent imaging results/findings within the past 24 hours.

## 2022-08-30 NOTE — PROGRESS NOTES
Patient is feeling better. Up in the chair today. Long discussion. They have questions about options. Discussed anticancer treatment (chemo, immunotherapy) indefinitely vs focusing on quality of life and palliative care. For him, hospice may be at a alf nursing home. They will discuss with family and let us know.

## 2022-08-30 NOTE — PLAN OF CARE
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  5/5/22 radical neck dissection level 2-5, sacrifice portion of SCM and accessory nerve due to tumor invasion.  DL was performed; no obvious lesion to biopsy.  Node at least 7 cm with MOHAN. p16 weak-moderate positive.  6/3/22 PET CT with hypermetabolic uptake at right neck (site of surgery). Nonspecific uptake in small bowel (without CT mass correlate), axial skeleton (likely hyperplastic marrow), and prostate (brachytherapy beads present).  6/20/22-8/2/22 chemoradiation with carboplatin (cisplatin ineligible)      Paroxysmal atrial fibrillation  [I48.0]  Yes     - on ASA 325mg daily  - followed by cardiology, Dr. Sandhu  -currently on amiodarone, toprol, eliquis and plavix        Resolved Hospital Problems    Diagnosis Date Resolved POA    Acute decompensated heart failure [I50.9] 08/29/2022 Yes       Patient is homebound due to:  Infection of venous access port    Allergies:  Review of patient's allergies indicates:   Allergen Reactions    Ciprofloxacin Rash     Diffuse pruritic morbilliform rash developed 3/15/2017 after dose of cipro; previously in 2/2017 he had rash/fevers after initiation of cipro    Zosyn [piperacillin-tazobactam] Rash     Diffuse pruritic morbilliform rash developed 3/15/2017.  Then, 430am dose on 3/16 and rash worsened with SOB/tachypnea but no  hypoxemia.     Bacitracin Itching and Rash     Violaceous rash in area of topical Tx.        Vitals:  Routine    Diet: renal diet    Activities:   Activity as tolerated    Goals of Care Treatment Preferences:  Code Status: Full Code    Living Will: Yes              Nursing Precautions:  Fall    Consults:   PT to evaluate and treat- 3 times a week and OT to evaluate and treat- 3 times a week       Medications: Discontinue all previous medication orders, if any. See new list below.     Medication List        START taking these medications      cephALEXin 500 MG capsule  Commonly known as: KEFLEX  Take 1 capsule (500 mg total) by mouth every 6 (six) hours. for 7 days  Start taking on: August 31, 2022     doxycycline 100 MG tablet  Commonly known as: VIBRA-TABS  Take 1 tablet (100 mg total) by mouth every 12 (twelve) hours. for 7 days  Start taking on: August 31, 2022     enoxaparin 120 mg/0.8 mL Syrg  Commonly known as: LOVENOX  Inject 0.8 mLs (120 mg total) into the skin every 12 (twelve) hours.     senna-docusate 8.6-50 mg 8.6-50 mg per tablet  Commonly known as: PERICOLACE  Take 1 tablet by mouth 2 (two) times daily as needed for Constipation.            CHANGE how you take these medications      atorvastatin 40 MG tablet  Commonly known as: LIPITOR  Take 1 tablet (40 mg total) by mouth once daily.  What changed: when to take this     metoprolol succinate 25 MG 24 hr tablet  Commonly known as: TOPROL-XL  Take 1 tablet by mouth once daily  What changed:   how much to take  when to take this     vancomycin 125 mg/5 mL Soln  Take 5 mLs (125 mg total) by mouth every 12 (twelve) hours. for 7 days  Start taking on: August 31, 2022  What changed: when to take this     vitamin D 1000 units Tab  Commonly known as: VITAMIN D3  Take 1 tablet (1,000 Units total) by mouth once daily.  What changed: how much to take            CONTINUE taking these medications      acyclovir 800 MG Tab  Commonly known as: ZOVIRAX  Take 200 mg by  mouth 2 (two) times daily.     amiodarone 200 MG Tab  Commonly known as: PACERONE  Take 1 tablet (200 mg total) by mouth once daily.     artificial tears 0.5 % ophthalmic solution  Commonly known as: ISOPTO TEARS  Place 1 drop into both eyes as needed.     dexAMETHasone 4 MG Tab  Commonly known as: DECADRON  4mg (1 tablet) by mouth twice a day for two days starting the day after chemo.  Repeat after each dose of chemo.     furosemide 20 MG tablet  Commonly known as: LASIX  Take 2 tablets (40 mg total) by mouth once daily.     gabapentin 100 MG capsule  Commonly known as: NEURONTIN  2 capsules once daily     hydrocodone-apap 7.5-325 MG/15 ML oral solution  Commonly known as: HYCET  Take 15 mLs by mouth every 8 (eight) hours as needed for Pain.     LIDOcaine-prilocaine cream  Commonly known as: EMLA  Apply generously to port site 30-60 min prior to chemo and then cover with saran wrap.     melatonin 5 mg  Commonly known as: MELATIN  Take 10 mg by mouth nightly.     nitroGLYCERIN 0.4 MG SL tablet  Commonly known as: NITROSTAT  Place 1 tablet (0.4 mg total) under the tongue every 5 (five) minutes as needed for Chest pain.     oxybutynin 5 MG Tr24  Commonly known as: DITROPAN-XL  Take 1 tablet (5 mg total) by mouth every evening.     pantoprazole 40 MG tablet  Commonly known as: PROTONIX  Take 1 tablet (40 mg total) by mouth once daily.     silver sulfADIAZINE 1% 1 % cream  Commonly known as: SILVADENE  Apply topically.     sucralfate 100 mg/mL suspension  Commonly known as: CARAFATE  Take 10 mLs (1 g total) by mouth 4 (four) times daily before meals and nightly.     tamsulosin 0.4 mg Cap  Commonly known as: FLOMAX  Take 1 capsule (0.4 mg total) by mouth every evening.     venlafaxine 75 MG tablet  Commonly known as: EFFEXOR  Take 1 tablet (75 mg total) by mouth every evening.            STOP taking these medications      albuterol 90 mcg/actuation inhaler  Commonly known as: VENTOLIN HFA     clopidogreL 75 mg  tablet  Commonly known as: PLAVIX     diphenhydrAMINE-aluminum-magnesium hydroxide-simethicone-LIDOcaine HCl 2%     ELIQUIS 5 mg Tab  Generic drug: apixaban     MUGARD Soln  Generic drug: gly-carb h-poly a-pot hydrox     omeprazole 20 MG capsule  Commonly known as: PRILOSEC     sildenafiL 100 MG tablet  Commonly known as: VIAGRA     traZODone 50 MG tablet  Commonly known as: DESYREL                Immunizations Administered as of 8/30/2022       Name Date Dose VIS Date Route Exp Date    COVID-19, MRNA, LN-S, PF (Pfizer) (Purple Cap) 11/23/2021 12:58 PM 0.3 mL 12/12/2020 Intramuscular 2/28/2022    Site: Left deltoid     Given By: Francia Tom RN     : [employer]     Lot: [id number]     COVID-19, MRNA, LN-S, PF (Pfizer) (Purple Cap) 1/28/2021  3:35 PM 0.3 mL 12/12/2020 Intramuscular 5/31/2021    Site: Left deltoid     Given By: Nancy Beckwith MA     : [employer]     Lot: [id number]     COVID-19, MRNA, LN-S, PF (Pfizer) (Purple Cap) 1/7/2021  3:41 PM 0.3 mL 12/12/2020 Intramuscular 4/30/2021    Site: Left deltoid     Given By: Mya Null RN     : [employer]     Lot: [id number]             This patient has had both covid vaccinations    Some patients may experience side effects after vaccination.  These may include fever, headache, muscle or joint aches.  Most symptoms resolve with 24-48 hours and do not require urgent medical evaluation unless they persist for more than 72 hours or symptoms are concerning for an unrelated medical condition.          _________________________________  Art Carvalho MD  08/30/2022

## 2022-08-30 NOTE — PLAN OF CARE
Problem: Adult Inpatient Plan of Care  Goal: Plan of Care Review  Outcome: Ongoing, Progressing   POC reviewed with patient and family. He is tolerating po antibiotics . Up in chair most of the day. VSS and afebrile. Plan to dc to snf tomorrow

## 2022-08-30 NOTE — PROGRESS NOTES
Spoke with daughter, Radha, to update her in regards to the acceptance at [facility]. She discussed that scans showed cancer has come back. Patient and wife discussing whether he would want to pursue further treatment or hospice care. Discussed placement as  either skilled vs snf with placement. She is familiar with the process. She will let me know what family decides. Received call from Deyanira with [facility] they were about to get authorization from [facility] for skilled services. Called and let wife and daughter know.

## 2022-08-30 NOTE — PROGRESS NOTES
Followed up with [facility] but there was no answer and left message. Also followed up with [facility] and [facility] but there was no answer so I left message. Spoke to admissions at [facility]. They can accept patient. They will reach out to the family to discuss financials. Requested Tb skin test and Covid test. Called in [facility] and faxed in PASSAR. Should be able to transfer to facility tomorrow.

## 2022-08-31 PROBLEM — L08.9 SOFT TISSUE INFECTION: Status: ACTIVE | Noted: 2022-01-01

## 2022-08-31 NOTE — PROGRESS NOTES
Notified [facility] of need to move discharge till tomorrow. Will also fax over new orders once they have been updated. Also went bedside to discuss with patient and family.

## 2022-08-31 NOTE — PT/OT/SLP PROGRESS
Physical Therapy Treatment    Patient Name:  Janusz Montgomery Jr.   MRN:  [id number]    Recommendations:     Discharge Recommendations:  nursing facility, skilled   Discharge Equipment Recommendations:  (TBD)   Barriers to discharge: None    Assessment:     Janusz Montgomery Jr. is a 77 y.o. male admitted with a medical diagnosis of Infection of venous access port.  He presents with the following impairments/functional limitations:  weakness, impaired endurance, impaired functional mobility, gait instability, decreased lower extremity function. Pt tolerated session and has been progressing in gait distance CGA to 140' total (20' , 120') with 1 sitting rest break and portable O2; pt's R knee buckled in first gait trial, and improved in second gait trial. Pt continues to present with SOB during gait. Pt still requires skilled PT services to improve functional mobility and recommend discharge to SNF.    Rehab Prognosis: Good; patient would benefit from acute skilled PT services to address these deficits and reach maximum level of function.    Recent Surgery: * No surgery found *      Plan:     During this hospitalization, patient to be seen 4 x/week to address the identified rehab impairments via gait training, therapeutic activities, therapeutic exercises and progress toward the following goals:    Plan of Care Expires:  09/25/22    Subjective     Chief Complaint: weakness and SOB  Patient/Family Comments/goals: R knee buckling  Pain/Comfort:  Pain Rating 1: 0/10      Objective:     Communicated with nursing prior to session.  Patient found HOB elevated with peripheral IV, oxygen upon PT entry to room.     General Precautions: Standard, fall   Orthopedic Precautions:N/A   Braces: N/A  Respiratory Status: Nasal cannula, flow 4 L/min     Functional Mobility:  Bed Mobility:     Supine to Sit: modified independence  Sit to Supine: modified independence  Transfers:     Sit to Stand:  stand by assistance with no AD; cuing to reach  back for chair when sitting  Gait: CGA with RW 20' , 120' with 1 sitting rest break in rolling bedside chair and portable O2; R knee buckling in 1st gait trial; decreased step length; decreased B heel strike   Balance:   Static sitting EOB: good, SBA  Static standing: good with RW support   Dynamic standing: good with RW support    AM-PAC 6 CLICK MOBILITY  Turning over in bed (including adjusting bedclothes, sheets and blankets)?: 4  Sitting down on and standing up from a chair with arms (e.g., wheelchair, bedside commode, etc.): 3  Moving from lying on back to sitting on the side of the bed?: 4  Moving to and from a bed to a chair (including a wheelchair)?: 3  Need to walk in hospital room?: 3  Climbing 3-5 steps with a railing?: 1  Basic Mobility Total Score: 18       Therapeutic Activities and Exercises:  Sitting EOB AROM LE exercises: AILYN Fuentes x 10; SOB present  Pt educated about call bell and safety with mobilization from chair to bed. Updated pt on progress and discharge planning.    Patient left up in chair with all lines intact and call button in reach..    GOALS:   Multidisciplinary Problems       Physical Therapy Goals          Problem: Physical Therapy    Goal Priority Disciplines Outcome Goal Variances Interventions   Physical Therapy Goal     PT, PT/OT Ongoing, Progressing     Description: Goals to be met by: 2022    Patient will increase functional independence with mobility by performin. Supine to sit with Mod-Todd- MET 2022  2. Sit to supine with Todd  3. Sit to stand transfer with Todd and RW.  4. Gait  x 20' feet with Contact Guard Assistance using Rolling Walker. MET- 2022  5. Lower extremity exercise program x10 reps per handout, with independence  6. Pt received gait training x 50' with CGA using RW with O2.                         Time Tracking:     PT Received On: 22  PT Start Time: 913     PT Stop Time: 936  PT Total Time (min): 23  min     Billable Minutes: Gait Training 15 min and Therapeutic Activity 8 min    Treatment Type: Treatment  PT/PTA: PT     PTA Visit Number: 0     08/31/2022

## 2022-08-31 NOTE — SUBJECTIVE & OBJECTIVE
Interval History: complained of worsening erythema and swelling of the right lower leg. US venous neg for DVT. ID recommended to switch back to IV abx.    Hemoglobin is back at 6 today. No signs of bleeding. Will check fecal occult blood. One unit of PRBC today. Will postpone discharge to tomorrow     Oncology Treatment Plan:   OP CARBOPLATIN WEEKLY    Medications:  Continuous Infusions:  Scheduled Meds:   acyclovir  200 mg Oral BID    albuterol-ipratropium  3 mL Nebulization Q6H    amiodarone  200 mg Oral Daily    atorvastatin  40 mg Oral QHS    cefTRIAXone (ROCEPHIN) IVPB  1 g Intravenous Q24H    enoxaparin  120 mg Subcutaneous Q12H    furosemide  40 mg Oral Daily    gabapentin  200 mg Oral Daily    lactulose  10 g Oral TID    melatonin  9 mg Oral QHS    metoprolol succinate  25 mg Oral Daily    oxybutynin  5 mg Oral QHS    pantoprazole  40 mg Oral Daily    polyethylene glycol  17 g Oral BID    tamsulosin  1 capsule Oral QHS    vancomycin  125 mg Oral Q12H    venlafaxine  75 mg Oral QHS     PRN Meds:sodium chloride, sodium chloride, acetaminophen, aluminum-magnesium hydroxide-simethicone, artificial tears, dextrose 10%, dextrose 10%, glucagon (human recombinant), glucose, glucose, HYDROcodone-acetaminophen, naloxone, ondansetron, promethazine, senna-docusate 8.6-50 mg, simethicone, sodium chloride 0.9%, Pharmacy to dose Vancomycin consult **AND** vancomycin - pharmacy to dose     Review of Systems   Constitutional:  Negative for chills, diaphoresis, fatigue and fever.   HENT:  Negative for congestion, rhinorrhea, sore throat and trouble swallowing.    Respiratory:  Negative for cough, chest tightness, shortness of breath and wheezing.    Cardiovascular:  Negative for chest pain, palpitations and leg swelling.   Gastrointestinal:  Negative for abdominal distention, abdominal pain, blood in stool, diarrhea, nausea and vomiting.   Genitourinary:  Negative for difficulty urinating, dysuria, flank pain and hematuria.    Musculoskeletal:  Negative for arthralgias, back pain and neck pain.   Skin:  Negative for rash and wound.   Neurological:  Negative for dizziness, syncope, weakness, light-headedness, numbness and headaches.   Objective:     Vital Signs (Most Recent):  Temp: 97.7 °F (36.5 °C) (08/31/22 1214)  Pulse: 82 (08/31/22 1248)  Resp: 18 (08/31/22 1248)  BP: (!) 130/58 (08/31/22 1214)  SpO2: 98 % (08/31/22 0800) Vital Signs (24h Range):  Temp:  [97.7 °F (36.5 °C)-99.9 °F (37.7 °C)] 97.7 °F (36.5 °C)  Pulse:  [78-89] 82  Resp:  [10-20] 18  SpO2:  [94 %-98 %] 98 %  BP: (109-156)/(51-66) 130/58     Weight: (!) 142.9 kg (315 lb 0.6 oz)  Body mass index is 41.56 kg/m².  Body surface area is 2.71 meters squared.      Intake/Output Summary (Last 24 hours) at 8/31/2022 1723  Last data filed at 8/31/2022 1500  Gross per 24 hour   Intake 1290 ml   Output 1100 ml   Net 190 ml         Physical Exam  Constitutional:       General: He is not in acute distress.     Appearance: He is not ill-appearing.   HENT:      Head: Normocephalic and atraumatic.      Mouth/Throat:      Mouth: Mucous membranes are moist.      Pharynx: Oropharynx is clear.   Eyes:      Extraocular Movements: Extraocular movements intact.      Pupils: Pupils are equal, round, and reactive to light.   Neck:      Comments: Right neck erythema  Cardiovascular:      Rate and Rhythm: Normal rate and regular rhythm.      Pulses: Normal pulses.      Heart sounds: Normal heart sounds.   Pulmonary:      Effort: Pulmonary effort is normal. No respiratory distress.      Breath sounds: No wheezing.   Abdominal:      General: Abdomen is flat. Bowel sounds are normal.      Palpations: Abdomen is soft.   Musculoskeletal:      Cervical back: Neck supple.      Right lower leg: Edema present.      Left lower leg: Edema present.      Comments: RLE erythema   Skin:     General: Skin is warm and dry.      Capillary Refill: Capillary refill takes less than 2 seconds.      Comments: Incision  from  port removal that is CDI with  improvement in erythema. No active bleeding.    Neurological:      General: No focal deficit present.      Mental Status: He is alert and oriented to person, place, and time. Mental status is at baseline.   Psychiatric:         Mood and Affect: Mood normal.         Behavior: Behavior normal.         Thought Content: Thought content normal.         Judgment: Judgment normal.       Significant Labs:   Recent Lab Results         08/31/22  0907        Albumin 2.8       Alkaline Phosphatase 117       ALT 28       Anion Gap 9       AST 26       Baso # 0.02       Basophil % 0.2       BILIRUBIN TOTAL 0.9  Comment: For infants and newborns, interpretation of results should be based  on gestational age, weight and in agreement with clinical  observations.    Premature Infant recommended reference ranges:  Up to 24 hours.............<8.0 mg/dL  Up to 48 hours............<12.0 mg/dL  3-5 days..................<15.0 mg/dL  6-29 days.................<15.0 mg/dL         BUN 17       Calcium 9.0       Chloride 95       CO2 35       Creatinine 1.3       Differential Method Automated       eGFR 57.3       Eos # 0.1       Eosinophil % 1.5       Glucose 104       Gran # (ANC) 6.8       Gran % 78.1       Hematocrit 17.1  Comment: HCT   critical result(s) called and verbal readback obtained from   Carole Mullen RN. by MICHOACANO 08/31/2022 12:01         Hemoglobin 6.0       Immature Grans (Abs) 0.05  Comment: Mild elevation in immature granulocytes is non specific and   can be seen in a variety of conditions including stress response,   acute inflammation, trauma and pregnancy. Correlation with other   laboratory and clinical findings is essential.         Immature Granulocytes 0.6       Lymph # 0.8       Lymph % 9.3       MCH 34.7       MCHC 35.1       MCV 99       Mono # 0.9       Mono % 10.3       MPV 11.2       nRBC 0       Platelets 156       Potassium 3.4       PROTEIN TOTAL 6.3       RBC 1.73        RDW 17.1       Sodium 139       WBC 8.70               Diagnostic Results:  I have reviewed all pertinent imaging results/findings within the past 24 hours.

## 2022-08-31 NOTE — PT/OT/SLP PROGRESS
"Occupational Therapy   Treatment    Name: Janusz Montgomery Jr.  MRN: [id number]  Admitting Diagnosis:  Infection of venous access port       Recommendations:     Discharge Recommendations: nursing facility, skilled  Discharge Equipment Recommendations:   (TBD)  Barriers to discharge:  Decreased caregiver support    Assessment:     Janusz Montgomery Jr. is a 77 y.o. male with a medical diagnosis of Infection of venous access port.  He presents w fair tolerance to session. Upon entry to room pt observed to have profuse coughing stating a piece of meat from his lunch went down the wrong way. Pt states he had been having difficulty with swallowing the past couple of days. RN notified of incident stating speech has been consulted for swallow study. Pt repositioned and requested small sips of water to assist with coughing. Performance deficits affecting function are weakness, impaired endurance, impaired cardiopulmonary response to activity, impaired self care skills, impaired functional mobility, gait instability, decreased lower extremity function.   Pt motivated to return home however not at baseline for ADL and functional mobility performance in addition to concerns of fall risk and would benefit from continued OT services at this time.    Rehab Prognosis:  Fair; patient would benefit from acute skilled OT services to address these deficits and reach maximum level of function.       Plan:     Patient to be seen 3 x/week to address the above listed problems via self-care/home management, therapeutic activities, therapeutic exercises  Plan of Care Expires: 09/26/22  Plan of Care Reviewed with: patient    Subjective   "Every time I try and take a breath it makes me start to cough again"  Pain/Comfort:  Pain Rating 1: 0/10    Objective:     Communicated with: RN prior to session.  Patient found up in chair with oxygen upon OT entry to room.    General Precautions: Standard, fall, aspiration   Orthopedic Precautions:N/A   Braces: " N/A  Respiratory Status: Nasal cannula, flow 3.5 L/min     Occupational Performance:     Bed Mobility:    Patient completed Sit to Supine with stand by assistance   Pt w SBA for anterior scoot/bridge towards HOB  Functional Mobility/Transfers:  Patient completed Sit <> Stand Transfer with stand by assistance  with  no assistive device   Patient completed Bed <> Chair Transfer using Stand Pivot technique with stand by assistance with no assistive device  Functional Mobility: pt completed functional ambulation t~ 4 steps from chair to bed to simulate household mobility requiring SBA and no AD    Activities of Daily Living:  Not completed this session       [facility] 6 Click ADL: 20    Treatment & Education:  Pt educated on scope of practice and importance of daily functional mobility.  Pt updated and family updated on POC  RN notified of coughing and choking concerns   Pt educated on safety precautions during transfers    Patient left supine with call button in reach, RN notified, and family present    GOALS:   Multidisciplinary Problems       Occupational Therapy Goals          Problem: Occupational Therapy    Goal Priority Disciplines Outcome Interventions   Occupational Therapy Goal     OT, PT/OT Ongoing, Progressing    Description: Goals to be met by: 9/9/2022    Patient will increase functional independence with ADLs by performing:    UE Dressing with Stand-by Assistance.  LE Dressing with Contact Guard Assistance.  Grooming while standing with Supervision.  Toileting from bedside commode with Stand-by Assistance for hygiene and clothing management.   Toilet transfer to bedside commode with Stand-by Assistance using LRAD.                         Time Tracking:     OT Date of Treatment: 08/31/22  OT Start Time: 1340  OT Stop Time: 1354  OT Total Time (min): 14 min    Billable Minutes:Therapeutic Activity 14    OT/COSTA: OT          8/31/2022

## 2022-08-31 NOTE — PROGRESS NOTES
Pharmacokinetic Initial Assessment: IV Vancomycin    Assessment/Plan:    Patient previous on vancomycin 750 mg IV every 24 hours. Will continue dose with this new consult.  Desired empiric serum trough concentration is 10 to 20 mcg/mL  Draw vancomycin trough level 60 min prior to third dose on 9/2 at approximately 1800  Pharmacy will continue to follow and monitor vancomycin.      Please contact pharmacy at extension [phone] with any questions regarding this assessment.     Thank you for the consult,   Stella Parkeren       Patient brief summary:  Janusz Montgomery Jr. is a 77 y.o. male initiated on antimicrobial therapy with IV Vancomycin for treatment of suspected skin & soft tissue infection    Drug Allergies:   Review of patient's allergies indicates:   Allergen Reactions    Ciprofloxacin Rash     Diffuse pruritic morbilliform rash developed 3/15/2017 after dose of cipro; previously in 2/2017 he had rash/fevers after initiation of cipro    Zosyn [piperacillin-tazobactam] Rash     Diffuse pruritic morbilliform rash developed 3/15/2017.  Then, 430am dose on 3/16 and rash worsened with SOB/tachypnea but no hypoxemia.     Bacitracin Itching and Rash     Violaceous rash in area of topical Tx.        Actual Body Weight:   142.9 kg    Renal Function:   Estimated Creatinine Clearance: 73 mL/min (based on SCr of 1.3 mg/dL).,     Dialysis Method (if applicable):  N/A    CBC (last 72 hours):  Recent Labs   Lab Result Units 08/29/22  0803 08/30/22  0316 08/31/22  0907   WBC K/uL 7.05 7.82 8.70   Hemoglobin g/dL 7.7* 7.9* 6.0*   Hematocrit % 22.6* 22.8* 17.1*   Platelets K/uL 139* 144* 156   Gran % % 76.3* 78.0* 78.1*   Lymph % % 10.2* 9.1* 9.3*   Mono % % 10.8 10.7 10.3   Eosinophil % % 1.7 1.5 1.5   Basophil % % 0.6 0.4 0.2   Differential Method  Automated Automated Automated       Metabolic Panel (last 72 hours):  Recent Labs   Lab Result Units 08/29/22  0803 08/30/22  0316 08/31/22  0907   Sodium mmol/L 140 141 139   Potassium  mmol/L 3.4* 3.8 3.4*   Chloride mmol/L 96 94* 95   CO2 mmol/L 37* 38* 35*   Glucose mg/dL 97 101 104   BUN mg/dL 14 18 17   Creatinine mg/dL 1.4 1.3 1.3   Albumin g/dL 2.6* 2.8* 2.8*   Total Bilirubin mg/dL 0.7 0.8 0.9   Alkaline Phosphatase U/L 122 131 117   AST U/L 23 23 26   ALT U/L 21 24 28   Magnesium mg/dL 1.7 1.7  --        Drug levels (last 3 results):  No results for input(s): VANCOMYCINRA, VANCORANDOM, VANCOMYCINPE, VANCOPEAK, VANCOMYCINTR, VANCOTROUGH in the last 72 hours.    Microbiologic Results:  Microbiology Results (last 7 days)       Procedure Component Value Units Date/Time    IV catheter culture [[id number]] Collected: 08/26/22 1603    Order Status: Completed Specimen: Abscess Updated: 08/29/22 0828     Aerobic Culture - Cath tip No growth    Blood culture [[id number]] Collected: 08/24/22 0113    Order Status: Completed Specimen: Blood from Peripheral, Lower Arm, Right Updated: 08/29/22 0612     Blood Culture, Routine No growth after 5 days.    Blood culture [[id number]] Collected: 08/24/22 0112    Order Status: Completed Specimen: Blood from Peripheral, Hand, Right Updated: 08/29/22 0612     Blood Culture, Routine No growth after 5 days.    Tissue culture [[id number]] Collected: 08/26/22 1609    Order Status: Canceled Specimen: Tissue from Chest, Right     Direct AFB stain [[id number]] Collected: 08/26/22 1607    Order Status: Canceled Specimen: Abscess from Chest, Right     Modified Acid Fast Stain For Nocardia [[id number]] Collected: 08/26/22 1607    Order Status: Canceled Specimen: Abscess from Chest, Right     AFB Culture & Smear [[id number]] Collected: 08/26/22 1607    Order Status: Canceled Specimen: Abscess from Chest, Right     Culture, Anaerobe [[id number]] Collected: 08/26/22 1607    Order Status: Canceled Specimen: Abscess from Chest, Right     Fungus culture [[id number]] Collected: 08/26/22 1607    Order Status: Canceled Specimen: Abscess from Chest, Right     Tissue culture [[id number]]      Order Status: Canceled Specimen: Tissue from Chest, Right     Tissue culture [[id number]] Collected: 08/26/22 1603    Order Status: Canceled Specimen: Abscess from Chest, Right     Gram stain [[id number]]     Order Status: Canceled Specimen: Chest     Culture, Anaerobe [[id number]]     Order Status: Canceled Specimen: Chest     Aerobic culture [[id number]]     Order Status: Canceled Specimen: Chest     Fungus culture [[id number]]     Order Status: Canceled Specimen: Wound from Chest, Right     AFB Culture & Smear [[id number]]     Order Status: Canceled Specimen: Skin from Chest, Right

## 2022-08-31 NOTE — PLAN OF CARE
Patient A/O x4 during shift. No complications to note. Patient remains on home o2 of 4L NC without difficulties. Tolerated po antibiotics. All personal belongings and call light within reach during shift. Bed in lowest position and locked throughout the night.     Problem: Adult Inpatient Plan of Care  Goal: Readiness for Transition of Care  Outcome: Adequate for Care Transition     Problem: Bariatric Environmental Safety  Goal: Safety Maintained with Care  Outcome: Adequate for Care Transition     Problem: Adjustment to Illness (Sepsis/Septic Shock)  Goal: Optimal Coping  Outcome: Adequate for Care Transition     Problem: Adult Inpatient Plan of Care  Goal: Optimal Comfort and Wellbeing  Outcome: Ongoing, Progressing     Problem: Impaired Wound Healing  Goal: Optimal Wound Healing  Outcome: Ongoing, Progressing

## 2022-08-31 NOTE — PROGRESS NOTES
[facility] - Oncology ([facility])  Hematology/Oncology  Progress Note    Patient Name: Janusz Montgomery Jr.  Admission Date: 8/23/2022  Hospital Length of Stay: 7 days  Code Status: Full Code     Subjective:     HPI:  Janusz Montgomery is a 77 y.o. 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 Follows with Dr. Hargrove. Completed chemoradiation therapy on 8/1/22. Reports some redness to right neck which he attributes to radiation. However, has since developed redness, swelling, and tenderness overlying right chest port concerning for possible infection over last 2-3 days. Denies fever or chills. Increasing edema in extremities worse in RUE over last 4-5 days and generalized weakness causing difficulty ambulating. Recently hospitalized with dehydration, NAT, weakness, C dif. Completed vancomycin PO 7 days and diarrhea resolved. Rehydrated and concern that he then became overloaded. Cr uptrending on discharge. Recently saw nephro 8/22 for hospital f/u, noted worsening Cr baseline and increased Lasix to 40 mg daily.      Interval History: complained of worsening erythema and swelling of the right lower leg. US venous neg for DVT. ID recommended to switch back to IV abx.    Hemoglobin is back at 6 today. No signs of bleeding. Will check fecal occult blood. One unit of PRBC today. Will postpone discharge to tomorrow     Oncology Treatment Plan:   OP CARBOPLATIN WEEKLY    Medications:  Continuous Infusions:  Scheduled Meds:   acyclovir  200 mg Oral BID    albuterol-ipratropium  3 mL Nebulization Q6H    amiodarone  200 mg Oral Daily    atorvastatin  40 mg Oral QHS    cefTRIAXone (ROCEPHIN) IVPB  1 g Intravenous Q24H    enoxaparin  120 mg Subcutaneous Q12H    furosemide  40 mg Oral Daily    gabapentin   200 mg Oral Daily    lactulose  10 g Oral TID    melatonin  9 mg Oral QHS    metoprolol succinate  25 mg Oral Daily    oxybutynin  5 mg Oral QHS    pantoprazole  40 mg Oral Daily    polyethylene glycol  17 g Oral BID    tamsulosin  1 capsule Oral QHS    vancomycin  125 mg Oral Q12H    venlafaxine  75 mg Oral QHS     PRN Meds:sodium chloride, sodium chloride, acetaminophen, aluminum-magnesium hydroxide-simethicone, artificial tears, dextrose 10%, dextrose 10%, glucagon (human recombinant), glucose, glucose, HYDROcodone-acetaminophen, naloxone, ondansetron, promethazine, senna-docusate 8.6-50 mg, simethicone, sodium chloride 0.9%, Pharmacy to dose Vancomycin consult **AND** vancomycin - pharmacy to dose     Review of Systems   Constitutional:  Negative for chills, diaphoresis, fatigue and fever.   HENT:  Negative for congestion, rhinorrhea, sore throat and trouble swallowing.    Respiratory:  Negative for cough, chest tightness, shortness of breath and wheezing.    Cardiovascular:  Negative for chest pain, palpitations and leg swelling.   Gastrointestinal:  Negative for abdominal distention, abdominal pain, blood in stool, diarrhea, nausea and vomiting.   Genitourinary:  Negative for difficulty urinating, dysuria, flank pain and hematuria.   Musculoskeletal:  Negative for arthralgias, back pain and neck pain.   Skin:  Negative for rash and wound.   Neurological:  Negative for dizziness, syncope, weakness, light-headedness, numbness and headaches.   Objective:     Vital Signs (Most Recent):  Temp: 97.7 °F (36.5 °C) (08/31/22 1214)  Pulse: 82 (08/31/22 1248)  Resp: 18 (08/31/22 1248)  BP: (!) 130/58 (08/31/22 1214)  SpO2: 98 % (08/31/22 0800) Vital Signs (24h Range):  Temp:  [97.7 °F (36.5 °C)-99.9 °F (37.7 °C)] 97.7 °F (36.5 °C)  Pulse:  [78-89] 82  Resp:  [10-20] 18  SpO2:  [94 %-98 %] 98 %  BP: (109-156)/(51-66) 130/58     Weight: (!) 142.9 kg (315 lb 0.6 oz)  Body mass index is 41.56 kg/m².  Body surface  area is 2.71 meters squared.      Intake/Output Summary (Last 24 hours) at 8/31/2022 1723  Last data filed at 8/31/2022 1500  Gross per 24 hour   Intake 1290 ml   Output 1100 ml   Net 190 ml         Physical Exam  Constitutional:       General: He is not in acute distress.     Appearance: He is not ill-appearing.   HENT:      Head: Normocephalic and atraumatic.      Mouth/Throat:      Mouth: Mucous membranes are moist.      Pharynx: Oropharynx is clear.   Eyes:      Extraocular Movements: Extraocular movements intact.      Pupils: Pupils are equal, round, and reactive to light.   Neck:      Comments: Right neck erythema  Cardiovascular:      Rate and Rhythm: Normal rate and regular rhythm.      Pulses: Normal pulses.      Heart sounds: Normal heart sounds.   Pulmonary:      Effort: Pulmonary effort is normal. No respiratory distress.      Breath sounds: No wheezing.   Abdominal:      General: Abdomen is flat. Bowel sounds are normal.      Palpations: Abdomen is soft.   Musculoskeletal:      Cervical back: Neck supple.      Right lower leg: Edema present.      Left lower leg: Edema present.      Comments: RLE erythema   Skin:     General: Skin is warm and dry.      Capillary Refill: Capillary refill takes less than 2 seconds.      Comments: Incision from  port removal that is CDI with  improvement in erythema. No active bleeding.    Neurological:      General: No focal deficit present.      Mental Status: He is alert and oriented to person, place, and time. Mental status is at baseline.   Psychiatric:         Mood and Affect: Mood normal.         Behavior: Behavior normal.         Thought Content: Thought content normal.         Judgment: Judgment normal.       Significant Labs:   Recent Lab Results         08/31/22  0907        Albumin 2.8       Alkaline Phosphatase 117       ALT 28       Anion Gap 9       AST 26       Baso # 0.02       Basophil % 0.2       BILIRUBIN TOTAL 0.9  Comment: For infants and newborns,  interpretation of results should be based  on gestational age, weight and in agreement with clinical  observations.    Premature Infant recommended reference ranges:  Up to 24 hours.............<8.0 mg/dL  Up to 48 hours............<12.0 mg/dL  3-5 days..................<15.0 mg/dL  6-29 days.................<15.0 mg/dL         BUN 17       Calcium 9.0       Chloride 95       CO2 35       Creatinine 1.3       Differential Method Automated       eGFR 57.3       Eos # 0.1       Eosinophil % 1.5       Glucose 104       Gran # (ANC) 6.8       Gran % 78.1       Hematocrit 17.1  Comment: HCT   critical result(s) called and verbal readback obtained from   Carole Mullen RN. by MICHOACANO 08/31/2022 12:01         Hemoglobin 6.0       Immature Grans (Abs) 0.05  Comment: Mild elevation in immature granulocytes is non specific and   can be seen in a variety of conditions including stress response,   acute inflammation, trauma and pregnancy. Correlation with other   laboratory and clinical findings is essential.         Immature Granulocytes 0.6       Lymph # 0.8       Lymph % 9.3       MCH 34.7       MCHC 35.1       MCV 99       Mono # 0.9       Mono % 10.3       MPV 11.2       nRBC 0       Platelets 156       Potassium 3.4       PROTEIN TOTAL 6.3       RBC 1.73       RDW 17.1       Sodium 139       WBC 8.70               Diagnostic Results:  I have reviewed all pertinent imaging results/findings within the past 24 hours.    Assessment/Plan:     * Soft tissue infection  -- ID on board, cultures have been negative so far. Right sided chest area erythema was likely malignancy and not infection  -- today RLE erythema worsened while on PO abx. ID recommended switching to IV abx. US LE duplex neg for DVT  -- Port removed by IR 8/26     Acute blood loss anemia  HGB 6  Unclear if patient is actively bleeding. No gross signs of bleeding.     Plan:  - Transfuse 1 unit PRBC  - CBCs daily  - Transfuse with goal Hb > 7  - Fecal occult blood  today       Acute DVT (deep venous thrombosis)  - Lovenox resumed 8/28.   - monitor anti Xa levels     Chronic respiratory failure with hypoxia  Patient with Hypoxic Respiratory failure which is Chronic.  he is on home oxygen at 4 LPM. Supplemental oxygen was provided and noted-  .   Signs/symptoms of respiratory failure include- lethargy. Contributing diagnoses includes - CHF, restrictive lung disease. Labs and images were reviewed. Patient Has not had a recent ABG. Will treat underlying causes and adjust management of respiratory failure as follows- Continue home 4L O2    - Duonebs PRN for mild wheezing on 8/28   - repeat CXR negative for acute findings     Head and neck cancer  -- Squamous cell carcinoma of the right head and neck s/p radical neck dissection and chemoradiation with carboplatin, completed 8/1/22. Follows with Dr. Hargrove.     bx of the right sided chest area results as squamous cell carcinoma. Ct chest showed pulmonary nodules and CT neck showed soft tissue mass at site of previous malignancy. Overall picture consistent with disease progression.    Plan for discharge to SNF to improve performance status and follow up with oncology as outpatient      Paroxysmal atrial fibrillation   -- Continue home amiodarone 200 mg daily, metoprolol succinate 25 mg daily  -- F/u with his cardiologist (Dr. Sandhu) as scheduled             Art Carvalho MD  Hematology/Oncology  [facility] - Oncology ([facility])

## 2022-08-31 NOTE — PLAN OF CARE
Problem: Adult Inpatient Plan of Care  Goal: Plan of Care Review  Outcome: Ongoing, Progressing  POC reviewed with patient and family. Discharge held today for marked change in Heme/ Hct repeat labs are pending. Also patient went for ultrasound of bilateral lower extremities. Occult blood stool sample pending collection. VSS and afebrile this shift  . Speech swallow eval pending , talked to team,  they will see him tomorrow. Plan is to discharge to SNF for rehab when all completed and necessary adjustments made .  Bed remains in low locked position , call light is in place . Frequent checks for care and safety made

## 2022-08-31 NOTE — ASSESSMENT & PLAN NOTE
-- ID on board, cultures have been negative so far. Right sided chest area erythema was likely malignancy and not infection  -- today RLE erythema worsened while on PO abx. ID recommended switching to IV abx. US LE duplex neg for DVT  -- Port removed by IR 8/26

## 2022-08-31 NOTE — ASSESSMENT & PLAN NOTE
HGB 6  Unclear if patient is actively bleeding. No gross signs of bleeding.     Plan:  - Transfuse 1 unit PRBC  - CBCs daily  - Transfuse with goal Hb > 7  - Fecal occult blood today

## 2022-09-01 NOTE — ASSESSMENT & PLAN NOTE
Patient with Hypoxic Respiratory failure which is Chronic.  he is on home oxygen at 4 LPM. Supplemental oxygen was provided and noted-  .   Signs/symptoms of respiratory failure include- lethargy. Contributing diagnoses includes - CHF, restrictive lung disease. Labs and images were reviewed. Patient Has not had a recent ABG. Will treat underlying causes and adjust management of respiratory failure as follows- Continue home 4L O2    ADHF vs metastatic disease. No LE edema and rales on lung exam but given patient's history of HF will treat HF first and if symptoms don't improve then will evaluate for worsening lung metastatic disease     - Duonebs PRN for mild wheezing on 8/28   - repeat CXR showed worsening edema, one time dose IV lasix

## 2022-09-01 NOTE — ASSESSMENT & PLAN NOTE
-- Squamous cell carcinoma of the right head and neck s/p radical neck dissection and chemoradiation with carboplatin, completed 8/1/22. Follows with Dr. Hargrove.     bx of the right sided chest area results as squamous cell carcinoma. Ct chest showed pulmonary nodules and CT neck showed soft tissue mass at site of previous malignancy. Overall picture consistent with disease progression.    Patient decided against SNF, now deciding between home hospice and home health or [facility] program.

## 2022-09-01 NOTE — PROGRESS NOTES
Spoke with admissions at [facility]. Confirmed that he will be able to be discharged today. Requesting updated clinicals as well as orders that include the IV antibiotics. Will fax them once orders completed and available.

## 2022-09-01 NOTE — ASSESSMENT & PLAN NOTE
-- Patient initally had concerns for port infection, Port removed by IR 8/26. ID on board, cultures have been negative so far. Right sided chest area erythema was likely malignancy and not infection. Patient also has RLE cellulitis with US LE duplex neg for DVT. Was on IV abx. Cellulitis worsened on PO keflex and doxy. Transitioned back to IV vanc and ceftrixone. Will discharge home health/home hospice on PO doxy and cefpodoxime

## 2022-09-01 NOTE — SUBJECTIVE & OBJECTIVE
Interval History: Patient decided against SNF, now deciding between home hospice and home health or [facility] program. Will follow up with patient and family on final plan. Consulted ID and will discharge on PO doxy and cefpodoxime. CXR ordered showing more pulmonary edema, will try a one time dose of IV lasix in the setting of worsening SOB and will monitor. If patient does not improve then possibly due to CA, and will consider ordering CT chest.     Oncology Treatment Plan:   OP CARBOPLATIN WEEKLY    Medications:  Continuous Infusions:  Scheduled Meds:   acyclovir  200 mg Oral BID    albuterol-ipratropium  3 mL Nebulization Q6H    amiodarone  200 mg Oral Daily    atorvastatin  40 mg Oral QHS    cefTRIAXone (ROCEPHIN) IVPB  1 g Intravenous Q24H    enoxaparin  120 mg Subcutaneous Q12H    furosemide (LASIX) injection  40 mg Intravenous Once    furosemide  40 mg Oral Daily    gabapentin  200 mg Oral Daily    melatonin  9 mg Oral QHS    metoprolol succinate  25 mg Oral Daily    oxybutynin  5 mg Oral QHS    pantoprazole  40 mg Oral Daily    polyethylene glycol  17 g Oral BID    tamsulosin  1 capsule Oral QHS    vancomycin  125 mg Oral Q12H    vancomycin (VANCOCIN) IVPB  750 mg Intravenous Q24H    venlafaxine  75 mg Oral QHS     PRN Meds:sodium chloride, acetaminophen, aluminum-magnesium hydroxide-simethicone, artificial tears, dextrose 10%, dextrose 10%, glucagon (human recombinant), glucose, glucose, HYDROcodone-acetaminophen, naloxone, ondansetron, promethazine, senna-docusate 8.6-50 mg, simethicone, sodium chloride 0.9%, Pharmacy to dose Vancomycin consult **AND** vancomycin - pharmacy to dose     Review of Systems   Constitutional:  Negative for chills, diaphoresis, fatigue and fever.   HENT:  Negative for congestion, rhinorrhea, sore throat and trouble swallowing.    Respiratory:  Positive for shortness of breath. Negative for cough, chest tightness and wheezing.    Cardiovascular:  Negative for chest pain, palpitations  and leg swelling.   Gastrointestinal:  Negative for abdominal distention, abdominal pain, blood in stool, diarrhea, nausea and vomiting.   Genitourinary:  Negative for difficulty urinating, dysuria, flank pain and hematuria.   Musculoskeletal:  Negative for arthralgias, back pain and neck pain.   Skin:  Negative for rash and wound.   Neurological:  Negative for dizziness, syncope, weakness, light-headedness, numbness and headaches.   Hematological:  Bruises/bleeds easily.   Objective:     Vital Signs (Most Recent):  Temp: 98.1 °F (36.7 °C) (09/01/22 1200)  Pulse: (!) 119 (09/01/22 1523)  Resp: 16 (09/01/22 1523)  BP: (!) 144/64 (09/01/22 1200)  SpO2: (!) 91 % (09/01/22 1523)   Vital Signs (24h Range):  Temp:  [96.2 °F (35.7 °C)-98.7 °F (37.1 °C)] 98.1 °F (36.7 °C)  Pulse:  [] 119  Resp:  [16-21] 16  SpO2:  [91 %-100 %] 91 %  BP: (107-144)/(52-64) 144/64     Weight: (!) 142.9 kg (315 lb 0.6 oz)  Body mass index is 41.56 kg/m².  Body surface area is 2.71 meters squared.      Intake/Output Summary (Last 24 hours) at 9/1/2022 1603  Last data filed at 9/1/2022 0427  Gross per 24 hour   Intake --   Output 1701 ml   Net -1701 ml       Physical Exam  Constitutional:       General: He is not in acute distress.     Appearance: He is not ill-appearing.   HENT:      Head: Normocephalic and atraumatic.      Right Ear: External ear normal.      Left Ear: External ear normal.      Nose: Nose normal.      Mouth/Throat:      Mouth: Mucous membranes are moist.      Pharynx: Oropharynx is clear.   Eyes:      Extraocular Movements: Extraocular movements intact.      Pupils: Pupils are equal, round, and reactive to light.   Neck:      Comments: Right neck erythema  Cardiovascular:      Rate and Rhythm: Normal rate and regular rhythm.      Pulses: Normal pulses.      Heart sounds: Normal heart sounds.   Pulmonary:      Effort: Pulmonary effort is normal. No respiratory distress.      Breath sounds: No wheezing.   Abdominal:       General: Abdomen is flat. Bowel sounds are normal.      Palpations: Abdomen is soft.   Musculoskeletal:      Cervical back: Neck supple.      Right lower leg: No edema.      Left lower leg: No edema.      Comments: RLE erythema   Skin:     General: Skin is warm and dry.      Capillary Refill: Capillary refill takes less than 2 seconds.      Comments: Incision from  port removal that is CDI with  improvement in erythema. No active bleeding.    Neurological:      General: No focal deficit present.      Mental Status: He is alert and oriented to person, place, and time. Mental status is at baseline.   Psychiatric:         Mood and Affect: Mood normal.         Behavior: Behavior normal.         Thought Content: Thought content normal.         Judgment: Judgment normal.       Significant Labs:   BMP:   Recent Labs   Lab 08/31/22  0907 09/01/22  0302    93    138   K 3.4* 4.1   CL 95 95   CO2 35* 35*   BUN 17 20   CREATININE 1.3 1.4   CALCIUM 9.0 9.3    and CBC:   Recent Labs   Lab 08/31/22  0907 08/31/22  1426 09/01/22  0302   WBC 8.70 8.58 7.86   HGB 6.0* 8.8* 7.5*   HCT 17.1* 25.6* 22.0*    146* 133*       Diagnostic Results:  I have reviewed and interpreted all pertinent imaging results/findings within the past 24 hours.

## 2022-09-01 NOTE — PLAN OF CARE
Problem: SLP  Goal: SLP Goal  Outcome: Ongoing, Progressing    SLP Clinical Swallow Evaluation completed. See note for details.

## 2022-09-01 NOTE — PLAN OF CARE
Antibiotic plan    If patient will be discharged to SNF: can continue vanc and ceftriaxone with end date of 9/8 to complete course for cellulitis    If patient plans to discharge home w/ hospice: can transition IV abx to PO doxycycline and cefpodoxime for duration of therapy    Follow up w/ ID as needed    Sharonelia Ontiveroso DO  Transplant Infectious Disease

## 2022-09-01 NOTE — PLAN OF CARE
VSS. Remained free from falls. BM x1. Slept well, remains on 3.5L O2. No complaints of pain.       Problem: Adult Inpatient Plan of Care  Goal: Plan of Care Review  Outcome: Ongoing, Progressing  Goal: Patient-Specific Goal (Individualized)  Outcome: Ongoing, Progressing  Goal: Absence of Hospital-Acquired Illness or Injury  Outcome: Ongoing, Progressing  Goal: Optimal Comfort and Wellbeing  Outcome: Ongoing, Progressing  Goal: Readiness for Transition of Care  Outcome: Ongoing, Progressing     Problem: Bariatric Environmental Safety  Goal: Safety Maintained with Care  Outcome: Ongoing, Progressing     Problem: Infection  Goal: Absence of Infection Signs and Symptoms  Outcome: Ongoing, Progressing     Problem: Impaired Wound Healing  Goal: Optimal Wound Healing  Outcome: Ongoing, Progressing     Problem: Adjustment to Illness (Sepsis/Septic Shock)  Goal: Optimal Coping  Outcome: Ongoing, Progressing     Problem: Bleeding (Sepsis/Septic Shock)  Goal: Absence of Bleeding  Outcome: Ongoing, Progressing     Problem: Glycemic Control Impaired (Sepsis/Septic Shock)  Goal: Blood Glucose Level Within Desired Range  Outcome: Ongoing, Progressing     Problem: Infection Progression (Sepsis/Septic Shock)  Goal: Absence of Infection Signs and Symptoms  Outcome: Ongoing, Progressing     Problem: Nutrition Impaired (Sepsis/Septic Shock)  Goal: Optimal Nutrition Intake  Outcome: Ongoing, Progressing     Problem: Fluid and Electrolyte Imbalance (Acute Kidney Injury/Impairment)  Goal: Fluid and Electrolyte Balance  Outcome: Ongoing, Progressing     Problem: Oral Intake Inadequate (Acute Kidney Injury/Impairment)  Goal: Optimal Nutrition Intake  Outcome: Ongoing, Progressing     Problem: Renal Function Impairment (Acute Kidney Injury/Impairment)  Goal: Effective Renal Function  Outcome: Ongoing, Progressing     Problem: Fall Injury Risk  Goal: Absence of Fall and Fall-Related Injury  Outcome: Ongoing, Progressing

## 2022-09-01 NOTE — ASSESSMENT & PLAN NOTE
HGB 6  Unclear if patient is actively bleeding. No gross signs of bleeding. Fecal occult blood negative    Plan:  - Transfuse 1 unit PRBC  - CBCs daily  - Transfuse with goal Hb > 7  - Pending folate, B12, TIBC, Ferratin

## 2022-09-01 NOTE — PROGRESS NOTES
Met with patient, wife and 2 of his stepdaughters at the bedside. They have decided not to pursue skilled at the nursing home. Patient understands that he cancer progressed despite the treatment he recently received. He does not want to spend what limited days he has at a skilled facility. He would rather spend time at home with his wife. He feels he wants to focus on quality of life. Long discussion about home health, home health with [facility] program and hospice. They are open to have someone come and speak to them about hospice services. They are unsure which avenue they would like pursue. They do not have a preference on hospice provider to come and provide information. Daria with [facility] contacted. She will come and speak to patient and family to provide information. Received call back from Daria she met with family today. Family and patient want time to think over all that has been presented before making a decision.

## 2022-09-01 NOTE — CARE UPDATE
RAPID RESPONSE NURSE ROUND       Rounding completed with charge RNCarole for MEWS check. CN report no concerns at this time. Pt will be discharged today. Instructed to call [phone] for further concerns or assistance.

## 2022-09-01 NOTE — PT/OT/SLP EVAL
Speech Language Pathology Evaluation  Bedside Swallow  Discharge Summary    Patient Name:  Janusz Montgomery Jr.   MRN:  [id number]  Admitting Diagnosis: Soft tissue infection    Recommendations:                 General Recommendations:  Follow-up not indicated  Diet recommendations:  Regular, Thin   Aspiration Precautions: 1 bite/sip at a time, Alternating bites/sips, HOB to 90 degrees, Meds crushed in puree, Small bites/sips, and Strict aspiration precautions, continue good oral care  General Precautions: Standard, fall  Communication strategies:  none    History:     Past Medical History:   Diagnosis Date    (HFpEF) heart failure with preserved ejection fraction 02/04/2022    NAT (acute kidney injury) 03/19/2017    ALLERGIC RHINITIS     Anemia     Anxiety     Basal cell carcinoma 10/19/2018    forehead and right medial shoulder    Basal cell carcinoma 01/09/2019    left nasal bridge and left posterior ear    Basal cell carcinoma 06/12/2020    left lower medial leg - Efudex    Basal cell carcinoma 11/30/2020    left medial upper eyelid canthus superficial BCC    Chronic rhinitis 05/03/2013    Chronic rhinitis 05/03/2013    Coronary artery disease involving native coronary artery of native heart without angina pectoris s/p RCA stent     Cortical cataract of both eyes 03/18/2016    Delayed sleep phase syndrome 03/13/2019    Depression     Erectile dysfunction 03/24/2014    Erectile dysfunction 03/24/2014    Essential hypertension     GERD (gastroesophageal reflux disease) 07/25/2012    Gout, arthritis     Grade III open fracture of left tibia and fibula s/p ex-fix on 7/1/16 and ORIF of left tibia on 7/15 07/06/2016    H/O: iritis     Helicobacter pylori (H. pylori) infection     Treated    Hepatitis     Herpes simplex keratoconjunctivitis 09/30/2015    - on acyclovir - followed by opthalmology, Dr. Uribe     Herpes simplex keratoconjunctivitis 09/30/2015    - on acyclovir - followed by opthalmologyDr. Uribe      "Hyperkalemia 02/28/2017    Hyperlipidemia     Hypogonadism male     Hypogonadism male     Immunodeficiency due to drug therapy 07/08/2022    Keloid cicatrix     Lymphoma     HEAD & NECK    Mixed anxiety and depressive disorder     Morbid obesity     Obstructive sleep apnea on CPAP     CPAP    On home oxygen therapy     Osteoarthritis of left knee 07/25/2012    Paroxysmal atrial fibrillation 07/06/2016    Primary osteoarthritis of left knee 07/25/2012    Prominent aorta 01/25/2016    "RESULTS: THE HEART IS MILDLY ENLARGED WITH A SLIGHTLY PROMINENT AORTA" - Xray Chest PA & Lateral 12-     Prostate cancer 02/15/2016    - followed by urology, Dr. Young     Prostate cancer 02/15/2016    - followed by urology, Dr. 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    chest and forehead    Squamous cell cancer of skin of nose     Traumatic type III open fracture of shaft of left tibia and fibula with nonunion 07/06/2016    Type III open fracture of left tibia and fibula with routine healing 07/06/2016    Vitamin D deficiency disease     Vitreous detachment 03/18/2016       Past Surgical History:   Procedure Laterality Date    ADJACENT TISSUE TRANSFER Left 11/30/2020    Procedure: ADJACENT TISSUE TRANSFER x2 ;  Surgeon: Teresa Cooper MD;  Location: [facility] OR [address] [place] [address];  Service: Ophthalmology;  Laterality: Left;  Left glabellar 8.5x11x3 and Left upper eyelid advancement flap [id number]      Cardiac stenting x2      CATARACT EXTRACTION W/  INTRAOCULAR LENS IMPLANT Right 3/29/2016    Dr. Conteh    CATARACT EXTRACTION W/  INTRAOCULAR LENS IMPLANT Left 4/12/2016        COLONOSCOPY N/A 7/23/2020    Procedure: COLONOSCOPY;  " "Surgeon: Nico Lackey MD;  Location: [facility] (2ND FLR);  Service: Endoscopy;  Laterality: N/A;    DIRECT LARYNGOSCOPY N/A 5/4/2022    Procedure: LARYNGOSCOPY, DIRECT possible biopsy;  Surgeon: Etta Higgins MD;  Location: [facility] OR;  Service: ENT;  Laterality: N/A;  RN PREOP ON 4/29/22. NEEDS T&S DAY OF SURGERY--NF  CONSENTS DAY OF SURGERY    DISSECTION OF NECK Right 5/4/2022    Procedure: RADICAL NECK DISSECTION;  Surgeon: Etta Higgins MD;  Location: [facility] OR;  Service: ENT;  Laterality: Right;  TO FOLLOW DR ALMAZAN    ESOPHAGOGASTRODUODENOSCOPY N/A 7/23/2020    Procedure: EGD (ESOPHAGOGASTRODUODENOSCOPY);  Surgeon: Nico Lackey MD;  Location: [facility] (2ND FLR);  Service: Endoscopy;  Laterality: N/A;  per Dr Lackey-Will proceed with EGD and colonoscopy on the 2nd floor due to his comorbidities including obesity, sleep apnea, restrictive lung disease, coronary artery disease.  has loop recorder      ok to hold Eliquis 2 days per Dr Sandhu-must remain    EXTERNAL FIXATION TIBIAL FRACTURE Left 07/01/2016    INSERTION OF IMPLANTABLE LOOP RECORDER  04/07/2017    LEFT HEART CATHETERIZATION Left 1/30/2020    Procedure: Left heart cath;  Surgeon: Benjamin Sandhu MD;  Location: [facility] CATH LAB;  Service: Cardiology;  Laterality: Left;  RN PREOP 1/28/20--Pt starting Plavix loading dose today (8pills)- Dr. Sandhu aware.  Pt has a bandaged "non healing area to LLE"--Dr. Sandhu aware.    LEFT HEART CATHETERIZATION Left 2/14/2020    Procedure: Left heart cath, IVUS guided left main / LAD PCI. Noon start, radial approach;  Surgeon: Miguel Angel Brady MD;  Location: [facility] CATH LAB;  Service: Cardiology;  Laterality: Left;  RN PRE OP 2-7-2020  BMI--45.61    ORIF TIBIA FRACTURE Left 07/15/2016    PROBING OF NASOLACRIMAL DUCT WITH INSERTION OF TUBE Left 11/30/2020    Procedure: PROBING, NASOLACRIMAL DUCT,;  Surgeon: Teresa Cooper MD;  Location: [facility] 1ST FLR;  Service: Ophthalmology;  Laterality: Left;    " RADIOACTIVE SEED IMPLANTATION OF PROSTATE N/A 8/8/2018    Procedure: INSERTION, RADIOACTIVE SEED, PROSTATE;  Surgeon: Bipin Thompson MD;  Location: [address];  Service: Urology;  Laterality: N/A;  1 hour    SKIN BIOPSY      Squamous cell cancer removal x3 with Mohs surgery      TONSILLECTOMY      TOTAL KNEE ARTHROPLASTY  10/2012    TRIGGER FINGER RELEASE Right 10/14/2020    Procedure: RELEASE, TRIGGER FINGER - right;  Surgeon: Rad Swift MD;  Location: [facility] OR;  Service: Orthopedics;  Laterality: Right;    trus/bx      ULTRASOUND GUIDANCE  2/14/2020    Procedure: Ultrasound Guidance;  Surgeon: Miguel Angel Brady MD;  Location: [facility] CATH LAB;  Service: Cardiology;;       Social History: Patient lives with wife    Chest X-Rays: 8/28/22 interval removal right-sided Port-A-Cath with surgical clips right upper neck soft tissue again seen. Left-sided implanted Holter monitor unchanged. Continued ill-defined lung opacities bilaterally which may represent calcified pleural plaques this is most pronounced in the mid right lung.  No significant new lung opacity. Poor definition left lung base cannot exclude superimposed effusion. No large pneumothorax. Clinical correlation and follow-up advised.    Prior diet: regular    Subjective   Awake & alert. Wife & stepdaughter at bedside. Poor appetite.     Daughter reports they have decided to go home, they would like any diet recs.    Pain/Comfort:  Pain Rating 1: 0/10  Pain Rating Post-Intervention 2: 0/10    Respiratory Status: Nasal cannula    Objective:     Daughter reports that starting a month ago when he started doing the chemo & radiation he has a intermittent hacking cough. His appetite is poor since treatment. He has reflux & takes protonics as needed.     Pt reports that lately when he takes pills with water, the pill comes back up.     Oral Musculature Evaluation  Oral Musculature: WFL  Dentition: present and adequate  Oral Labial Strength and Mobility:  "WFL  Lingual Strength and Mobility: WFL  Volitional Cough: adequate  Volitional Swallow: unable to elicit "too dry"  Voice Prior to PO Intake: clear    Bedside Swallow Eval:   Consistencies Assessed:  Thin liquids small cup sips of thin x5  Solids 3/4 yuliana cracker taken in small bites      Oral Phase:   Prolonged mastication & pt reports solid becomes pasty in mouth, it helps him to alternate bites/sips to get it down    Pharyngeal Phase:   no overt clinical signs/symptoms of aspiration    Pt with no overt s/s aspiration during this session yet pt & family were educated on importance of following swallow precautions & strategies & to monitor for any s/s aspiration & how to modify diet once home if needed. Pt would benefit from having pills crushed in puree, buried in puree or cut in smaller pieces as needed.     Assessment:     Janusz Montgomery Jr. is a 77 y.o. male with oropharyngeal swallow appears WFL. Pt is having some intermittent coughing & some difficulty swallowing pills but does not appear at high risk of aspiration if following precautions & strategies. SLP feels pt to benefit from remaining on regular diet to have no limitations to what might be "appetizing" as long as following precautions. Please re-consult if any concerns.     Goals:   Multidisciplinary Problems       SLP Goals          Problem: SLP    Goal Priority Disciplines Outcome   SLP Goal     SLP Ongoing, Progressing                       Plan:     Patient to be seen:      Plan of Care expires:     Plan of Care reviewed with:  patient, spouse, daughter   SLP Follow-Up:  No       Discharge recommendations:   (tbd)     Time Tracking:     SLP Treatment Date:   09/01/22  Speech Start Time:  1310  Speech Stop Time:  1328     Speech Total Time (min):  18 min    Billable Minutes: Eval Swallow and Oral Function 10 and Self Care/Home Management Training 8    09/01/2022         "

## 2022-09-01 NOTE — PROGRESS NOTES
[facility] - Oncology ([facility])  Hematology/Oncology  Progress Note    Patient Name: Janusz Montgomery Jr.  Admission Date: 8/23/2022  Hospital Length of Stay: 8 days  Code Status: Full Code     Subjective:     HPI:  Janusz Montgomery is a 77 y.o. 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 Follows with Dr. Hargrove. Completed chemoradiation therapy on 8/1/22. Reports some redness to right neck which he attributes to radiation. However, has since developed redness, swelling, and tenderness overlying right chest port concerning for possible infection over last 2-3 days. Denies fever or chills. Increasing edema in extremities worse in RUE over last 4-5 days and generalized weakness causing difficulty ambulating. Recently hospitalized with dehydration, NAT, weakness, C dif. Completed vancomycin PO 7 days and diarrhea resolved. Rehydrated and concern that he then became overloaded. Cr uptrending on discharge. Recently saw nephro 8/22 for hospital f/u, noted worsening Cr baseline and increased Lasix to 40 mg daily.      Interval History: Patient decided against SNF, now deciding between home hospice and home health or AIM program. Will follow up with patient and family on final plan. Consulted ID and will discharge on PO doxy and cefpodoxime. CXR ordered showing more pulmonary edema, will try a one time dose of IV lasix in the setting of worsening SOB and will monitor. If patient does not improve then possibly due to CA, and will consider ordering CT chest.     Oncology Treatment Plan:   OP CARBOPLATIN WEEKLY    Medications:  Continuous Infusions:  Scheduled Meds:   acyclovir  200 mg Oral BID    albuterol-ipratropium  3 mL Nebulization Q6H    amiodarone  200 mg Oral Daily    atorvastatin  40 mg Oral  QHS    cefTRIAXone (ROCEPHIN) IVPB  1 g Intravenous Q24H    enoxaparin  120 mg Subcutaneous Q12H    furosemide (LASIX) injection  40 mg Intravenous Once    furosemide  40 mg Oral Daily    gabapentin  200 mg Oral Daily    melatonin  9 mg Oral QHS    metoprolol succinate  25 mg Oral Daily    oxybutynin  5 mg Oral QHS    pantoprazole  40 mg Oral Daily    polyethylene glycol  17 g Oral BID    tamsulosin  1 capsule Oral QHS    vancomycin  125 mg Oral Q12H    vancomycin (VANCOCIN) IVPB  750 mg Intravenous Q24H    venlafaxine  75 mg Oral QHS     PRN Meds:sodium chloride, acetaminophen, aluminum-magnesium hydroxide-simethicone, artificial tears, dextrose 10%, dextrose 10%, glucagon (human recombinant), glucose, glucose, HYDROcodone-acetaminophen, naloxone, ondansetron, promethazine, senna-docusate 8.6-50 mg, simethicone, sodium chloride 0.9%, Pharmacy to dose Vancomycin consult **AND** vancomycin - pharmacy to dose     Review of Systems   Constitutional:  Negative for chills, diaphoresis, fatigue and fever.   HENT:  Negative for congestion, rhinorrhea, sore throat and trouble swallowing.    Respiratory:  Positive for shortness of breath. Negative for cough, chest tightness and wheezing.    Cardiovascular:  Negative for chest pain, palpitations and leg swelling.   Gastrointestinal:  Negative for abdominal distention, abdominal pain, blood in stool, diarrhea, nausea and vomiting.   Genitourinary:  Negative for difficulty urinating, dysuria, flank pain and hematuria.   Musculoskeletal:  Negative for arthralgias, back pain and neck pain.   Skin:  Negative for rash and wound.   Neurological:  Negative for dizziness, syncope, weakness, light-headedness, numbness and headaches.   Hematological:  Bruises/bleeds easily.   Objective:     Vital Signs (Most Recent):  Temp: 98.1 °F (36.7 °C) (09/01/22 1200)  Pulse: (!) 119 (09/01/22 1523)  Resp: 16 (09/01/22 1523)  BP: (!) 144/64 (09/01/22 1200)  SpO2: (!) 91 % (09/01/22  1523)   Vital Signs (24h Range):  Temp:  [96.2 °F (35.7 °C)-98.7 °F (37.1 °C)] 98.1 °F (36.7 °C)  Pulse:  [] 119  Resp:  [16-21] 16  SpO2:  [91 %-100 %] 91 %  BP: (107-144)/(52-64) 144/64     Weight: (!) 142.9 kg (315 lb 0.6 oz)  Body mass index is 41.56 kg/m².  Body surface area is 2.71 meters squared.      Intake/Output Summary (Last 24 hours) at 9/1/2022 1603  Last data filed at 9/1/2022 0427  Gross per 24 hour   Intake --   Output 1701 ml   Net -1701 ml       Physical Exam  Constitutional:       General: He is not in acute distress.     Appearance: He is not ill-appearing.   HENT:      Head: Normocephalic and atraumatic.      Right Ear: External ear normal.      Left Ear: External ear normal.      Nose: Nose normal.      Mouth/Throat:      Mouth: Mucous membranes are moist.      Pharynx: Oropharynx is clear.   Eyes:      Extraocular Movements: Extraocular movements intact.      Pupils: Pupils are equal, round, and reactive to light.   Neck:      Comments: Right neck erythema  Cardiovascular:      Rate and Rhythm: Normal rate and regular rhythm.      Pulses: Normal pulses.      Heart sounds: Normal heart sounds.   Pulmonary:      Effort: Pulmonary effort is normal. No respiratory distress.      Breath sounds: No wheezing.   Abdominal:      General: Abdomen is flat. Bowel sounds are normal.      Palpations: Abdomen is soft.   Musculoskeletal:      Cervical back: Neck supple.      Right lower leg: No edema.      Left lower leg: No edema.      Comments: RLE erythema   Skin:     General: Skin is warm and dry.      Capillary Refill: Capillary refill takes less than 2 seconds.      Comments: Incision from  port removal that is CDI with  improvement in erythema. No active bleeding.    Neurological:      General: No focal deficit present.      Mental Status: He is alert and oriented to person, place, and time. Mental status is at baseline.   Psychiatric:         Mood and Affect: Mood normal.         Behavior:  Behavior normal.         Thought Content: Thought content normal.         Judgment: Judgment normal.       Significant Labs:   BMP:   Recent Labs   Lab 08/31/22  0907 09/01/22  0302    93    138   K 3.4* 4.1   CL 95 95   CO2 35* 35*   BUN 17 20   CREATININE 1.3 1.4   CALCIUM 9.0 9.3    and CBC:   Recent Labs   Lab 08/31/22  0907 08/31/22  1426 09/01/22  0302   WBC 8.70 8.58 7.86   HGB 6.0* 8.8* 7.5*   HCT 17.1* 25.6* 22.0*    146* 133*       Diagnostic Results:  I have reviewed and interpreted all pertinent imaging results/findings within the past 24 hours.    Assessment/Plan:     * Soft tissue infection  -- Patient initally had concerns for port infection, Port removed by IR 8/26. ID on board, cultures have been negative so far. Right sided chest area erythema was likely malignancy and not infection. Patient also has RLE cellulitis with US LE duplex neg for DVT. Was on IV abx. Cellulitis worsened on PO keflex and doxy. Transitioned back to IV vanc and ceftrixone. Will discharge home health/home hospice on PO doxy and cefpodoxime      Acute blood loss anemia  HGB 6  Unclear if patient is actively bleeding. No gross signs of bleeding. Fecal occult blood negative    Plan:  - Transfuse 1 unit PRBC  - CBCs daily  - Transfuse with goal Hb > 7  - Pending folate, B12, TIBC, Ferratin      Acute DVT (deep venous thrombosis)  - Lovenox resumed 8/28.   - monitor anti Xa levels     Chronic respiratory failure with hypoxia  Patient with Hypoxic Respiratory failure which is Chronic.  he is on home oxygen at 4 LPM. Supplemental oxygen was provided and noted-  .   Signs/symptoms of respiratory failure include- lethargy. Contributing diagnoses includes - CHF, restrictive lung disease. Labs and images were reviewed. Patient Has not had a recent ABG. Will treat underlying causes and adjust management of respiratory failure as follows- Continue home 4L O2    ADHF vs metastatic disease. No LE edema and rales on lung  exam but given patient's history of HF will treat HF first and if symptoms don't improve then will evaluate for worsening lung metastatic disease     - Travis PRN for mild wheezing on 8/28   - repeat CXR showed worsening edema, one time dose IV lasix     Head and neck cancer  -- Squamous cell carcinoma of the right head and neck s/p radical neck dissection and chemoradiation with carboplatin, completed 8/1/22. Follows with Dr. Hargrove.     bx of the right sided chest area results as squamous cell carcinoma. Ct chest showed pulmonary nodules and CT neck showed soft tissue mass at site of previous malignancy. Overall picture consistent with disease progression.    Patient decided against SNF, now deciding between home hospice and home health or AIM program.       Paroxysmal atrial fibrillation   -- Continue home amiodarone 200 mg daily, metoprolol succinate 25 mg daily  -- F/u with his cardiologist (Dr. Sandhu) as scheduled             Heather Vogel DO  Hematology/Oncology  [facility] - Oncology ([facility])

## 2022-09-02 NOTE — ASSESSMENT & PLAN NOTE
-- Continue home amiodarone 200 mg daily, metoprolol succinate 25 mg daily  -- F/u with his cardiologist (Dr. Sandhu) as scheduled, although patient has opted for hospice

## 2022-09-02 NOTE — DISCHARGE SUMMARY
[facility] - Oncology ([facility])  Hematology/Oncology  Discharge Summary      Patient Name: Janusz Montgomery Jr.  MRN: [id number]  Admission Date: 8/23/2022  Hospital Length of Stay: 9 days  Discharge Date and Time:  09/02/2022 2:07 PM  Attending Physician: Merly Romero MD   Discharging Provider: Johnny Espinosa MD  Primary Care Provider: Miguelina Weathers MD    HPI: Janusz Montgomery is a 77 y.o. 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 Follows with Dr. Hargrove. Completed chemoradiation therapy on 8/1/22. Reports some redness to right neck which he attributes to radiation. However, has since developed redness, swelling, and tenderness overlying right chest port concerning for possible infection over last 2-3 days. Denies fever or chills. Increasing edema in extremities worse in RUE over last 4-5 days and generalized weakness causing difficulty ambulating. Recently hospitalized with dehydration, NAT, weakness, C dif. Completed vancomycin PO 7 days and diarrhea resolved. Rehydrated and concern that he then became overloaded. Cr uptrending on discharge. Recently saw nephro 8/22 for hospital f/u, noted worsening Cr baseline and increased Lasix to 40 mg daily.      * No surgery found *     Hospital Course: Here he was found to havea suspected port infection. ID was consulted and started him on vanc/ceftriaxone. ID was also c/f the atypical appearance  of the nodules adjacent to the port and recommended skin biopsy for met workup vs atypical pathogen nodule. IR was consulted to remove port. Derm was consulted for skin biopsy but did not agree  that it was indicated. Pathology was consulted for possible  FNA if IR was unable to biopsy while removing port. Patient also found to have  DVT of the  RIJ and  subclavian. Was started on heparin gtt per IR recs. Intermittently supra therapeutic .  Port removed 8/26 by IR. Duonebs ordered for SOB and wheezing. Patient had worsening cellulitis while transitioned from PO keflex and doxy, switched back to IV antibiotics.       Patient decided against SNF, now deciding between home hospice and home health or AIM program. Family and patient ultimately decided on home with home hospice.  Consulted ID and will discharge on PO doxy and cefpodoxime. Discussed ongoing anticoagulation with patient. Will continue apixaban as previously noted DVT likely due to instrumentation; family also prefers ease of PO medication.       Goals of Care Treatment Preferences:  Code Status: Full Code    Living Will: Yes              Consults:   Consults (From admission, onward)        Status Ordering Provider     Pharmacy to dose Vancomycin consult  Once        Provider:  (Not yet assigned)   [facility] for full Linked Orders Report.    Acknowledged JOSÉ MANZO     Inpatient consult to Dermatology  Once        Provider:  (Not yet assigned)    Completed KIRBY POTTER     Inpatient consult to Interventional Radiology  Once        Provider:  (Not yet assigned)    Completed KIRBY POTTER     Inpatient consult to Infectious Diseases  Once        Provider:  (Not yet assigned)    Completed PATRICIA RAUSCH          Significant Diagnostic Studies: Labs:   CMP   Recent Labs   Lab 09/01/22  0302 09/02/22  0754    139   K 4.1 3.7   CL 95 96   CO2 35* 31*   GLU 93 103   BUN 20 18   CREATININE 1.4 1.4   CALCIUM 9.3 9.2   PROT 6.1  --    ALBUMIN 2.8*  --    BILITOT 0.8  --    ALKPHOS 132  --    AST 41*  --    ALT 40  --    ANIONGAP 8 12    and CBC   Recent Labs   Lab 08/31/22  1426 09/01/22  0302 09/02/22  0754   WBC 8.58 7.86 9.00   HGB 8.8* 7.5* 7.8*   HCT 25.6* 22.0* 23.7*   * 133* 144*       Pending Diagnostic Studies:     None        Final Active Diagnoses:    Diagnosis Date  Noted POA    PRINCIPAL PROBLEM:  Soft tissue infection [L08.9] 08/24/2022 Yes    Radiation dermatitis [L58.9] 08/28/2022 Yes    Multiple skin nodules [R22.9] 08/28/2022 Yes    Acute blood loss anemia [D62] 08/28/2022 No    Acute DVT (deep venous thrombosis) [I82.409] 08/25/2022 Yes    Chronic respiratory failure with hypoxia [J96.11] 08/11/2022 Yes    Head and neck cancer [C76.0] 05/05/2022 Yes    Paroxysmal atrial fibrillation  [I48.0] 07/06/2016 Yes      Problems Resolved During this Admission:    Diagnosis Date Noted Date Resolved POA    Acute decompensated heart failure [I50.9] 08/24/2022 08/29/2022 Yes      Discharged Condition: stable    Disposition: Hospice/Home    Follow Up:    Patient Instructions:   No discharge procedures on file.  Medications:  Reconciled Home Medications:      Medication List      START taking these medications    cefpodoxime 100 MG tablet  Commonly known as: VANTIN  Take 4 tablets (400 mg total) by mouth every 12 (twelve) hours. for 6 days     doxycycline 100 MG tablet  Commonly known as: VIBRA-TABS  Take 1 tablet (100 mg total) by mouth every 12 (twelve) hours. for 6 days     HYDROcodone-acetaminophen 7.5-325 mg per tablet  Commonly known as: NORCO  Take 1 tablet by mouth every 6 (six) hours as needed for Pain.  Replaces: hydrocodone-apap 7.5-325 MG/15 ML oral solution     senna-docusate 8.6-50 mg 8.6-50 mg per tablet  Commonly known as: PERICOLACE  Take 1 tablet by mouth 2 (two) times daily as needed for Constipation.     vancomycin 25 mg/mL solution  Take 5 mLs (125 mg total) by mouth every 12 (twelve) hours. for 7 days (Discard any remainder)        CHANGE how you take these medications    apixaban 5 mg Tab  Commonly known as: ELIQUIS  Take 1 tablet (5 mg total) by mouth 2 (two) times daily.  What changed: how much to take     metoprolol succinate 25 MG 24 hr tablet  Commonly known as: TOPROL-XL  Take 1 tablet by mouth once daily  What changed:   · how much to take  · when  to take this        CONTINUE taking these medications    acyclovir 800 MG Tab  Commonly known as: ZOVIRAX  Take 200 mg by mouth 2 (two) times daily.     amiodarone 200 MG Tab  Commonly known as: PACERONE  Take 1 tablet (200 mg total) by mouth once daily.     artificial tears 0.5 % ophthalmic solution  Commonly known as: ISOPTO TEARS  Place 1 drop into both eyes as needed.     furosemide 20 MG tablet  Commonly known as: LASIX  Take 2 tablets (40 mg total) by mouth once daily.     gabapentin 100 MG capsule  Commonly known as: NEURONTIN  2 capsules once daily     LIDOcaine-prilocaine cream  Commonly known as: EMLA  Apply generously to port site 30-60 min prior to chemo and then cover with saran wrap.     melatonin 5 mg  Commonly known as: MELATIN  Take 10 mg by mouth nightly.     nitroGLYCERIN 0.4 MG SL tablet  Commonly known as: NITROSTAT  Place 1 tablet (0.4 mg total) under the tongue every 5 (five) minutes as needed for Chest pain.     oxybutynin 5 MG Tr24  Commonly known as: DITROPAN-XL  Take 1 tablet (5 mg total) by mouth every evening.     pantoprazole 40 MG tablet  Commonly known as: PROTONIX  Take 1 tablet (40 mg total) by mouth once daily.     silver sulfADIAZINE 1% 1 % cream  Commonly known as: SILVADENE  Apply topically.     sucralfate 100 mg/mL suspension  Commonly known as: CARAFATE  Take 10 mLs (1 g total) by mouth 4 (four) times daily before meals and nightly.     tamsulosin 0.4 mg Cap  Commonly known as: FLOMAX  Take 1 capsule (0.4 mg total) by mouth every evening.     venlafaxine 75 MG tablet  Commonly known as: EFFEXOR  Take 1 tablet (75 mg total) by mouth every evening.        STOP taking these medications    albuterol 90 mcg/actuation inhaler  Commonly known as: VENTOLIN HFA     atorvastatin 40 MG tablet  Commonly known as: LIPITOR     clopidogreL 75 mg tablet  Commonly known as: PLAVIX     dexAMETHasone 4 MG Tab  Commonly known as: DECADRON     diphenhydrAMINE-aluminum-magnesium  hydroxide-simethicone-LIDOcaine HCl 2%     hydrocodone-apap 7.5-325 MG/15 ML oral solution  Commonly known as: HYCET  Replaced by: HYDROcodone-acetaminophen 7.5-325 mg per tablet     IAN Wilson  Generic drug: gly-carb h-poly a-pot hydrox     omeprazole 20 MG capsule  Commonly known as: PRILOSEC     sildenafiL 100 MG tablet  Commonly known as: VIAGRA     traZODone 50 MG tablet  Commonly known as: DESYREL     vancomycin 125 mg/5 mL Soln     vitamin D 1000 units Tab  Commonly known as: VITAMIN D3            Johnny Espinosa MD  Hematology/Oncology  [facility] - Oncology ([facility])

## 2022-09-02 NOTE — PROGRESS NOTES
Followed up with the family and they have opted to go with home hospice care. They would like to go with [facility]. They do not feel they will need any equipment. They feel they would be able to transport him home. She will bring oxygen tank to the home. Notified the team and Daria with [facility].

## 2022-09-02 NOTE — PLAN OF CARE
[facility]  Department of Hospital Medicine  [address] [place] [address]  [phone] after hours  [phone] fax    HOSPICE  ORDERS    09/02/2022    Admit to Hospice:  Home Service     Diagnoses:   Active Hospital Problems    Diagnosis  POA    *Soft tissue infection [L08.9]  Yes    Radiation dermatitis [L58.9]  Yes    Multiple skin nodules [R22.9]  Yes    Acute blood loss anemia [D62]  No    Acute DVT (deep venous thrombosis) [I82.409]  Yes    Chronic respiratory failure with hypoxia [J96.11]  Yes    Head and neck cancer [C76.0]  Yes     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  5/5/22 radical neck dissection level 2-5, sacrifice portion of SCM and accessory nerve due to tumor invasion.  DL was performed; no obvious lesion to biopsy.  Node at least 7 cm with MOHAN. p16 weak-moderate positive.  6/3/22 PET CT with hypermetabolic uptake at right neck (site of surgery). Nonspecific uptake in small bowel (without CT mass correlate), axial skeleton (likely hyperplastic marrow), and prostate (brachytherapy beads present).  6/20/22-8/2/22 chemoradiation with carboplatin (cisplatin ineligible)      Paroxysmal atrial fibrillation  [I48.0]  Yes     - on ASA 325mg daily  - followed by cardiology, Dr. Sandhu  -currently on amiodarone, toprol, eliquis and plavix        Resolved Hospital Problems    Diagnosis Date Resolved POA    Acute decompensated heart failure [I50.9] 08/29/2022 Yes       Hospice Qualifying Diagnoses:        Patient has a life expectancy < 6 months due to:  Primary Hospice Diagnosis:  metastatic squamous cell carcinoma of right head and neck    Comorbid Conditions Contributing to Decline:  HFpEF, CAD, CKD,     Vital Signs: Routine per Hospice Protocol.    Code Status: full    Allergies:   Review of patient's allergies indicates:   Allergen Reactions    Ciprofloxacin Rash     Diffuse pruritic morbilliform rash developed 3/15/2017 after dose of  cipro; previously in 2/2017 he had rash/fevers after initiation of cipro    Zosyn [piperacillin-tazobactam] Rash     Diffuse pruritic morbilliform rash developed 3/15/2017.  Then, 430am dose on 3/16 and rash worsened with SOB/tachypnea but no hypoxemia.     Bacitracin Itching and Rash     Violaceous rash in area of topical Tx.        Diet: adult regular    Activities: As tolerated    Goals of Care Treatment Preferences:  Code Status: Full Code    Living Will: Yes              Nursing: Per Hospice Routine.        Routine Skin for Bedridden Patients: Apply moisture barrier cream to all skin folds         Oxygen: 4-5 L Nasal cannula    Medications:        Medication List        START taking these medications      cefpodoxime 100 MG tablet  Commonly known as: VANTIN  Take 4 tablets (400 mg total) by mouth every 12 (twelve) hours. for 6 days     doxycycline 100 MG tablet  Commonly known as: VIBRA-TABS  Take 1 tablet (100 mg total) by mouth every 12 (twelve) hours. for 6 days     HYDROcodone-acetaminophen 7.5-325 mg per tablet  Commonly known as: NORCO  Take 1 tablet by mouth every 6 (six) hours as needed for Pain.  Replaces: hydrocodone-apap 7.5-325 MG/15 ML oral solution     senna-docusate 8.6-50 mg 8.6-50 mg per tablet  Commonly known as: PERICOLACE  Take 1 tablet by mouth 2 (two) times daily as needed for Constipation.     vancomycin 25 mg/mL solution  Take 5 mLs (125 mg total) by mouth every 12 (twelve) hours. for 7 days (Discard any remainder)            CHANGE how you take these medications      apixaban 5 mg Tab  Commonly known as: ELIQUIS  Take 1 tablet (5 mg total) by mouth 2 (two) times daily.  What changed: how much to take     metoprolol succinate 25 MG 24 hr tablet  Commonly known as: TOPROL-XL  Take 1 tablet by mouth once daily  What changed:   how much to take  when to take this            CONTINUE taking these medications      acyclovir 800 MG Tab  Commonly known as: ZOVIRAX  Take 200 mg by mouth 2 (two)  times daily.     amiodarone 200 MG Tab  Commonly known as: PACERONE  Take 1 tablet (200 mg total) by mouth once daily.     artificial tears 0.5 % ophthalmic solution  Commonly known as: ISOPTO TEARS  Place 1 drop into both eyes as needed.     furosemide 20 MG tablet  Commonly known as: LASIX  Take 2 tablets (40 mg total) by mouth once daily.     gabapentin 100 MG capsule  Commonly known as: NEURONTIN  2 capsules once daily     LIDOcaine-prilocaine cream  Commonly known as: EMLA  Apply generously to port site 30-60 min prior to chemo and then cover with saran wrap.     melatonin 5 mg  Commonly known as: MELATIN  Take 10 mg by mouth nightly.     nitroGLYCERIN 0.4 MG SL tablet  Commonly known as: NITROSTAT  Place 1 tablet (0.4 mg total) under the tongue every 5 (five) minutes as needed for Chest pain.     oxybutynin 5 MG Tr24  Commonly known as: DITROPAN-XL  Take 1 tablet (5 mg total) by mouth every evening.     pantoprazole 40 MG tablet  Commonly known as: PROTONIX  Take 1 tablet (40 mg total) by mouth once daily.     silver sulfADIAZINE 1% 1 % cream  Commonly known as: SILVADENE  Apply topically.     sucralfate 100 mg/mL suspension  Commonly known as: CARAFATE  Take 10 mLs (1 g total) by mouth 4 (four) times daily before meals and nightly.     tamsulosin 0.4 mg Cap  Commonly known as: FLOMAX  Take 1 capsule (0.4 mg total) by mouth every evening.     venlafaxine 75 MG tablet  Commonly known as: EFFEXOR  Take 1 tablet (75 mg total) by mouth every evening.            STOP taking these medications      albuterol 90 mcg/actuation inhaler  Commonly known as: VENTOLIN HFA     atorvastatin 40 MG tablet  Commonly known as: LIPITOR     clopidogreL 75 mg tablet  Commonly known as: PLAVIX     dexAMETHasone 4 MG Tab  Commonly known as: DECADRON     diphenhydrAMINE-aluminum-magnesium hydroxide-simethicone-LIDOcaine HCl 2%     hydrocodone-apap 7.5-325 MG/15 ML oral solution  Commonly known as: HYCET  Replaced by:  HYDROcodone-acetaminophen 7.5-325 mg per tablet     IAN Wilson  Generic drug: gly-carb h-poly a-pot hydrox     omeprazole 20 MG capsule  Commonly known as: PRILOSEC     sildenafiL 100 MG tablet  Commonly known as: VIAGRA     traZODone 50 MG tablet  Commonly known as: DESYREL     vancomycin 125 mg/5 mL Soln     vitamin D 1000 units Tab  Commonly known as: VITAMIN D3                Future Orders:  Hospice Medical Director may dictate new orders for comfortable care measures & sign death certificate.        _________________________________  Johnny Espinosa MD  09/02/2022

## 2022-09-02 NOTE — ASSESSMENT & PLAN NOTE
Patient with Hypoxic Respiratory failure which is Chronic.  he is on home oxygen at 4 LPM. Supplemental oxygen was provided and noted-  .   Signs/symptoms of respiratory failure include- lethargy. Contributing diagnoses includes - CHF, restrictive lung disease. Labs and images were reviewed. Patient Has not had a recent ABG. Will treat underlying causes and adjust management of respiratory failure as follows- Continue home 4L O2    ADHF vs metastatic disease. No LE edema and rales on lung exam but given patient's history of HF will treat HF first and if symptoms don't improve then will evaluate for worsening lung metastatic disease     - Travis PRN for mild wheezing on 8/28   - Possibly more congestion in chest, will do lasix as needed for fluid overload

## 2022-09-02 NOTE — SUBJECTIVE & OBJECTIVE
Interval History: Patient reports that he feels at his baseline. Him and his family have decided to go home with hospice services rather than go to SNF. Will discuss with patient his ongoing anticoagulation and place hospice orders in preparation for discharge.     Oncology Treatment Plan:   OP CARBOPLATIN WEEKLY    Medications:  Continuous Infusions:  Scheduled Meds:   acyclovir  200 mg Oral BID    albuterol-ipratropium  3 mL Nebulization Q6H    amiodarone  200 mg Oral Daily    atorvastatin  40 mg Oral QHS    cefTRIAXone (ROCEPHIN) IVPB  1 g Intravenous Q24H    enoxaparin  120 mg Subcutaneous Q12H    furosemide  40 mg Oral Daily    gabapentin  200 mg Oral Daily    melatonin  9 mg Oral QHS    metoprolol succinate  25 mg Oral Daily    oxybutynin  5 mg Oral QHS    pantoprazole  40 mg Oral Daily    polyethylene glycol  17 g Oral BID    tamsulosin  1 capsule Oral QHS    vancomycin  125 mg Oral Q12H    vancomycin (VANCOCIN) IVPB  750 mg Intravenous Q24H    venlafaxine  75 mg Oral QHS     PRN Meds:sodium chloride, acetaminophen, aluminum-magnesium hydroxide-simethicone, artificial tears, dextrose 10%, dextrose 10%, glucagon (human recombinant), glucose, glucose, HYDROcodone-acetaminophen, naloxone, ondansetron, promethazine, senna-docusate 8.6-50 mg, simethicone, sodium chloride 0.9%, Pharmacy to dose Vancomycin consult **AND** vancomycin - pharmacy to dose     Review of Systems   Constitutional:  Negative for chills, diaphoresis, fatigue and fever.   HENT:  Negative for congestion, rhinorrhea, sore throat and trouble swallowing.    Respiratory:  Positive for shortness of breath. Negative for cough, chest tightness and wheezing.    Cardiovascular:  Negative for chest pain, palpitations and leg swelling.   Gastrointestinal:  Negative for abdominal distention, abdominal pain, blood in stool, diarrhea, nausea and vomiting.   Genitourinary:  Negative for difficulty urinating, dysuria, flank pain and hematuria.    Musculoskeletal:  Negative for arthralgias, back pain and neck pain.   Skin:  Negative for rash and wound.   Neurological:  Negative for dizziness, syncope, weakness, light-headedness, numbness and headaches.   Hematological:  Bruises/bleeds easily.     Objective:     Vital Signs (Most Recent):  Temp: 98.2 °F (36.8 °C) (09/02/22 0724)  Pulse: 85 (09/02/22 0724)  Resp: 18 (09/02/22 0724)  BP: 128/60 (09/02/22 0710)  SpO2: 97 % (09/02/22 0724) Vital Signs (24h Range):  Temp:  [97.8 °F (36.6 °C)-98.2 °F (36.8 °C)] 98.2 °F (36.8 °C)  Pulse:  [] 85  Resp:  [16-18] 18  SpO2:  [91 %-98 %] 97 %  BP: (102-177)/(56-81) 128/60     Weight: (!) 142.9 kg (315 lb 0.6 oz)  Body mass index is 41.56 kg/m².  Body surface area is 2.71 meters squared.      Intake/Output Summary (Last 24 hours) at 9/2/2022 1115  Last data filed at 9/2/2022 1014  Gross per 24 hour   Intake 2290 ml   Output 2300 ml   Net -10 ml       Physical Exam  Constitutional:       General: He is not in acute distress.     Appearance: He is not ill-appearing.   HENT:      Head: Normocephalic and atraumatic.      Right Ear: External ear normal.      Left Ear: External ear normal.      Nose: Nose normal.      Mouth/Throat:      Mouth: Mucous membranes are moist.      Pharynx: Oropharynx is clear.   Eyes:      Extraocular Movements: Extraocular movements intact.      Pupils: Pupils are equal, round, and reactive to light.   Neck:      Comments: Right neck erythema  Cardiovascular:      Rate and Rhythm: Normal rate and regular rhythm.      Pulses: Normal pulses.      Heart sounds: Normal heart sounds.   Pulmonary:      Effort: Pulmonary effort is normal. No respiratory distress.      Breath sounds: No wheezing.   Abdominal:      General: Abdomen is flat. Bowel sounds are normal.      Palpations: Abdomen is soft.   Musculoskeletal:      Cervical back: Neck supple.      Right lower leg: No edema.      Left lower leg: No edema.      Comments: RLE erythema   Skin:      General: Skin is warm and dry.      Capillary Refill: Capillary refill takes less than 2 seconds.      Comments: Incision from  port removal that is CDI with  improvement in erythema. No active bleeding.    Neurological:      General: No focal deficit present.      Mental Status: He is alert and oriented to person, place, and time. Mental status is at baseline.   Psychiatric:         Mood and Affect: Mood normal.         Behavior: Behavior normal.         Thought Content: Thought content normal.         Judgment: Judgment normal.       Significant Labs:   All pertinent labs from the last 24 hours have been reviewed.    Diagnostic Results:  I have reviewed all pertinent imaging results/findings within the past 24 hours.

## 2022-09-02 NOTE — ASSESSMENT & PLAN NOTE
HGB 6  Unclear if patient is actively bleeding. No gross signs of bleeding. Fecal occult blood negative    Plan:  - Hemoglobin has been stable  - Continue to monitor with cbc

## 2022-09-02 NOTE — ASSESSMENT & PLAN NOTE
-- Squamous cell carcinoma of the right head and neck s/p radical neck dissection and chemoradiation with carboplatin, completed 8/1/22. Follows with Dr. Hargrove.     bx of the right sided chest area results as squamous cell carcinoma. Ct chest showed pulmonary nodules and CT neck showed soft tissue mass at site of previous malignancy. Overall picture consistent with disease progression.    Patient decided against SNF, wanted to go home with hospice services

## 2022-09-02 NOTE — PROGRESS NOTES
[facility] - Oncology ([facility])  Hematology/Oncology  Progress Note    Patient Name: Janusz Montgomery Jr.  Admission Date: 8/23/2022  Hospital Length of Stay: 9 days  Code Status: Full Code     Subjective:     HPI:  Janusz Montgomery is a 77 y.o. 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 Follows with Dr. Hargrove. Completed chemoradiation therapy on 8/1/22. Reports some redness to right neck which he attributes to radiation. However, has since developed redness, swelling, and tenderness overlying right chest port concerning for possible infection over last 2-3 days. Denies fever or chills. Increasing edema in extremities worse in RUE over last 4-5 days and generalized weakness causing difficulty ambulating. Recently hospitalized with dehydration, NAT, weakness, C dif. Completed vancomycin PO 7 days and diarrhea resolved. Rehydrated and concern that he then became overloaded. Cr uptrending on discharge. Recently saw nephro 8/22 for hospital f/u, noted worsening Cr baseline and increased Lasix to 40 mg daily.      Interval History: Patient reports that he feels at his baseline. Him and his family have decided to go home with hospice services rather than go to SNF. Will discuss with patient his ongoing anticoagulation and place hospice orders in preparation for discharge.     Oncology Treatment Plan:   OP CARBOPLATIN WEEKLY    Medications:  Continuous Infusions:  Scheduled Meds:   acyclovir  200 mg Oral BID    albuterol-ipratropium  3 mL Nebulization Q6H    amiodarone  200 mg Oral Daily    atorvastatin  40 mg Oral QHS    cefTRIAXone (ROCEPHIN) IVPB  1 g Intravenous Q24H    enoxaparin  120 mg Subcutaneous Q12H    furosemide  40 mg Oral Daily    gabapentin  200 mg Oral Daily    melatonin  9 mg  Oral QHS    metoprolol succinate  25 mg Oral Daily    oxybutynin  5 mg Oral QHS    pantoprazole  40 mg Oral Daily    polyethylene glycol  17 g Oral BID    tamsulosin  1 capsule Oral QHS    vancomycin  125 mg Oral Q12H    vancomycin (VANCOCIN) IVPB  750 mg Intravenous Q24H    venlafaxine  75 mg Oral QHS     PRN Meds:sodium chloride, acetaminophen, aluminum-magnesium hydroxide-simethicone, artificial tears, dextrose 10%, dextrose 10%, glucagon (human recombinant), glucose, glucose, HYDROcodone-acetaminophen, naloxone, ondansetron, promethazine, senna-docusate 8.6-50 mg, simethicone, sodium chloride 0.9%, Pharmacy to dose Vancomycin consult **AND** vancomycin - pharmacy to dose     Review of Systems   Constitutional:  Negative for chills, diaphoresis, fatigue and fever.   HENT:  Negative for congestion, rhinorrhea, sore throat and trouble swallowing.    Respiratory:  Positive for shortness of breath. Negative for cough, chest tightness and wheezing.    Cardiovascular:  Negative for chest pain, palpitations and leg swelling.   Gastrointestinal:  Negative for abdominal distention, abdominal pain, blood in stool, diarrhea, nausea and vomiting.   Genitourinary:  Negative for difficulty urinating, dysuria, flank pain and hematuria.   Musculoskeletal:  Negative for arthralgias, back pain and neck pain.   Skin:  Negative for rash and wound.   Neurological:  Negative for dizziness, syncope, weakness, light-headedness, numbness and headaches.   Hematological:  Bruises/bleeds easily.     Objective:     Vital Signs (Most Recent):  Temp: 98.2 °F (36.8 °C) (09/02/22 0724)  Pulse: 85 (09/02/22 0724)  Resp: 18 (09/02/22 0724)  BP: 128/60 (09/02/22 0710)  SpO2: 97 % (09/02/22 0724) Vital Signs (24h Range):  Temp:  [97.8 °F (36.6 °C)-98.2 °F (36.8 °C)] 98.2 °F (36.8 °C)  Pulse:  [] 85  Resp:  [16-18] 18  SpO2:  [91 %-98 %] 97 %  BP: (102-177)/(56-81) 128/60     Weight: (!) 142.9 kg (315 lb 0.6 oz)  Body mass index is  41.56 kg/m².  Body surface area is 2.71 meters squared.      Intake/Output Summary (Last 24 hours) at 9/2/2022 1115  Last data filed at 9/2/2022 1014  Gross per 24 hour   Intake 2290 ml   Output 2300 ml   Net -10 ml       Physical Exam  Constitutional:       General: He is not in acute distress.     Appearance: He is not ill-appearing.   HENT:      Head: Normocephalic and atraumatic.      Right Ear: External ear normal.      Left Ear: External ear normal.      Nose: Nose normal.      Mouth/Throat:      Mouth: Mucous membranes are moist.      Pharynx: Oropharynx is clear.   Eyes:      Extraocular Movements: Extraocular movements intact.      Pupils: Pupils are equal, round, and reactive to light.   Neck:      Comments: Right neck erythema  Cardiovascular:      Rate and Rhythm: Normal rate and regular rhythm.      Pulses: Normal pulses.      Heart sounds: Normal heart sounds.   Pulmonary:      Effort: Pulmonary effort is normal. No respiratory distress.      Breath sounds: No wheezing.   Abdominal:      General: Abdomen is flat. Bowel sounds are normal.      Palpations: Abdomen is soft.   Musculoskeletal:      Cervical back: Neck supple.      Right lower leg: No edema.      Left lower leg: No edema.      Comments: RLE erythema   Skin:     General: Skin is warm and dry.      Capillary Refill: Capillary refill takes less than 2 seconds.      Comments: Incision from  port removal that is CDI with  improvement in erythema. No active bleeding.    Neurological:      General: No focal deficit present.      Mental Status: He is alert and oriented to person, place, and time. Mental status is at baseline.   Psychiatric:         Mood and Affect: Mood normal.         Behavior: Behavior normal.         Thought Content: Thought content normal.         Judgment: Judgment normal.       Significant Labs:   All pertinent labs from the last 24 hours have been reviewed.    Diagnostic Results:  I have reviewed all pertinent imaging  results/findings within the past 24 hours.    Assessment/Plan:     * Soft tissue infection  -- Patient initally had concerns for port infection, Port removed by IR 8/26. ID on board, cultures have been negative so far. Right sided chest area erythema was likely malignancy and not infection. Patient also has RLE cellulitis with US LE duplex neg for DVT. Was on IV abx. Cellulitis worsened on PO keflex and doxy. Transitioned back to IV vanc and ceftrixone. Will discharge home health/home hospice on PO doxy and cefpodoxime      Acute blood loss anemia  HGB 6  Unclear if patient is actively bleeding. No gross signs of bleeding. Fecal occult blood negative    Plan:  - Hemoglobin has been stable  - Continue to monitor with cbc      Acute DVT (deep venous thrombosis)  - Lovenox resumed 8/28.   - monitor anti Xa levels   - Will determine if this is possible with hospice    Chronic respiratory failure with hypoxia  Patient with Hypoxic Respiratory failure which is Chronic.  he is on home oxygen at 4 LPM. Supplemental oxygen was provided and noted-  .   Signs/symptoms of respiratory failure include- lethargy. Contributing diagnoses includes - CHF, restrictive lung disease. Labs and images were reviewed. Patient Has not had a recent ABG. Will treat underlying causes and adjust management of respiratory failure as follows- Continue home 4L O2    ADHF vs metastatic disease. No LE edema and rales on lung exam but given patient's history of HF will treat HF first and if symptoms don't improve then will evaluate for worsening lung metastatic disease     - Duonebs PRN for mild wheezing on 8/28   - Possibly more congestion in chest, will do lasix as needed for fluid overload    Head and neck cancer  -- Squamous cell carcinoma of the right head and neck s/p radical neck dissection and chemoradiation with carboplatin, completed 8/1/22. Follows with Dr. Hargrove.     bx of the right sided chest area results as squamous cell carcinoma. Ct  chest showed pulmonary nodules and CT neck showed soft tissue mass at site of previous malignancy. Overall picture consistent with disease progression.    Patient decided against SNF, wanted to go home with hospice services      Paroxysmal atrial fibrillation   -- Continue home amiodarone 200 mg daily, metoprolol succinate 25 mg daily  -- F/u with his cardiologist (Dr. Sandhu) as scheduled, although patient has opted for hospice             Johnny Espinosa MD  Hematology/Oncology  [facility] - Oncology ([facility])

## 2022-09-02 NOTE — PLAN OF CARE
VSS on 4L at rest and CPAP when sleeping. Remained free from falls. Received 40 of lasix per order. Good intake and output. Slept well. No complaints of pain or nausea.       Problem: Adult Inpatient Plan of Care  Goal: Plan of Care Review  Outcome: Ongoing, Progressing  Goal: Patient-Specific Goal (Individualized)  Outcome: Ongoing, Progressing  Goal: Absence of Hospital-Acquired Illness or Injury  Outcome: Ongoing, Progressing  Goal: Optimal Comfort and Wellbeing  Outcome: Ongoing, Progressing  Goal: Readiness for Transition of Care  Outcome: Ongoing, Progressing     Problem: Bariatric Environmental Safety  Goal: Safety Maintained with Care  Outcome: Ongoing, Progressing     Problem: Infection  Goal: Absence of Infection Signs and Symptoms  Outcome: Ongoing, Progressing     Problem: Impaired Wound Healing  Goal: Optimal Wound Healing  Outcome: Ongoing, Progressing     Problem: Adjustment to Illness (Sepsis/Septic Shock)  Goal: Optimal Coping  Outcome: Ongoing, Progressing     Problem: Bleeding (Sepsis/Septic Shock)  Goal: Absence of Bleeding  Outcome: Ongoing, Progressing     Problem: Glycemic Control Impaired (Sepsis/Septic Shock)  Goal: Blood Glucose Level Within Desired Range  Outcome: Ongoing, Progressing     Problem: Infection Progression (Sepsis/Septic Shock)  Goal: Absence of Infection Signs and Symptoms  Outcome: Ongoing, Progressing     Problem: Nutrition Impaired (Sepsis/Septic Shock)  Goal: Optimal Nutrition Intake  Outcome: Ongoing, Progressing     Problem: Fluid and Electrolyte Imbalance (Acute Kidney Injury/Impairment)  Goal: Fluid and Electrolyte Balance  Outcome: Ongoing, Progressing     Problem: Oral Intake Inadequate (Acute Kidney Injury/Impairment)  Goal: Optimal Nutrition Intake  Outcome: Ongoing, Progressing     Problem: Renal Function Impairment (Acute Kidney Injury/Impairment)  Goal: Effective Renal Function  Outcome: Ongoing, Progressing     Problem: Fall Injury Risk  Goal: Absence of Fall  and Fall-Related Injury  Outcome: Ongoing, Progressing     Problem: Skin Injury Risk Increased  Goal: Skin Health and Integrity  Outcome: Ongoing, Progressing

## 2022-09-02 NOTE — ASSESSMENT & PLAN NOTE
- Lovenox resumed 8/28.   - monitor anti Xa levels   - Will determine if this is possible with hospice

## 2022-09-10 NOTE — PLAN OF CARE
Discharge instructions and prescriptions given and explained to pt.  Pt. verbalized understanding with no further questions.  Peripheral IV D/C'd with catheter tip intact.  VS WDL.  Patient is awaiting ride home by daughter.      ROAD TEST  O=SpO2 94% on 4L  A=Ambulating around room and hallway  D=IV D/C'd  T=Tolerating regular diet  E=Voids  S=Performs self care independently  T=Teaching on wounds care complete, NA   2

## 2022-09-12 NOTE — TELEPHONE ENCOUNTER
Tc returned to [facility] at 8:37 am, 9:39 am, 11:48 am, and 3:17 pm  Message left on   each time for her to return call

## 2022-09-12 NOTE — TELEPHONE ENCOUNTER
----- Message from Evi Shelton sent at 9/12/2022  8:17 AM CDT -----  Regarding: Pt's Wife Bri Jackson called to speak to the nurse due to the pt being in Hospice Care and she would like a call back today  Patient Advice:    Pt's Wife Bri Jackson called to speak to the nurse due to the pt being in Hospice Care and she would like a call back today asap.    Ms Gregory can be reached at [phone]

## 2022-10-13 ENCOUNTER — TELEPHONE (OUTPATIENT)
Dept: FAMILY MEDICINE | Facility: CLINIC | Age: 77
End: 2022-10-13
Payer: MEDICARE

## 2022-10-13 NOTE — TELEPHONE ENCOUNTER
"----- Message from Erlinda Ambriz sent at 10/13/2022  8:04 AM CDT -----  Regarding: Bri "wife"  .Type: Patient Call Back    Who called: Bri "wife"    What is the request in detail: Requesting to have the  death certificate signed asap    Can the clinic reply by [facility]? Call back     Would the patient rather a call back or a response via [facility]? Call back     Best call back number: [phone]        "

## 2022-10-13 NOTE — TELEPHONE ENCOUNTER
Spoke with pt's wife informed her that hospice Dr needs to sign death certificate. Wife verbalized understanding.

## 2022-10-13 NOTE — TELEPHONE ENCOUNTER
"----- Message from Erlinda Ambriz sent at 10/13/2022  8:04 AM CDT -----  Regarding: Bri "wife"  .Type: Patient Call Back    Who called: Bir "wife"    What is the request in detail: Requesting to have the  death certificate signed asap    Can the clinic reply by [facility]? Call back     Would the patient rather a call back or a response via [facility]? Call back     Best call back number: [phone]        "

## 2023-03-06 NOTE — TELEPHONE ENCOUNTER
Spoke with the pt and rescheduled his appt.  Pt didn't want to see another provider.  Patient verbalized understandings.   2-point gait

## 2024-01-08 NOTE — PROGRESS NOTES
HPI:  Mr. 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  He is doing well and still has no pain.  His heel ulcer is essentially healed at this point and he continues to ambulate with no assistive devices.   He does have one small area of irritated/abraded skin on the medial side of the ankle.  No drainage.  No constitutional symptoms.      ROS:  Patient denies constitutional symptoms, cardiac symptoms, respiratory symptoms, GI symptoms.  The remainder of the musculoskeletal ROS is included in the HPI.    PE:    AA&O x 4.  NAD  HEENT:  NCAT, sclera nonicteric  Lungs:  Respirations are equal and unlabored.  CV:  2+ bilateral upper and lower extremity pulses.    PE:  Incisions well healed throughout.  There is one dime sized medial area with very superficial skin abrasion/irritation that appears to be only epidermal.  No underlying fluctuance or surrounding erythema..  Heel ulcer essentially healed with small hardened area remaining.      Rads:  Hwr intact.  Good alignment.  Fibula healed and tibia with bridging bone, much more than last films.  No varus.    A/P:  1.5 years out now from revision of his fixation and he is overall doing very well.  The medial skin continues to give him problems only in that one spot.  I gave him a prescription for Medihoney to put there.  He has a little callus at the heel and is going to see his wound care person for this.  He will also ask them for any suggestions for the medial skin as well.  Continue activity as tolerated.    
General Sunscreen Counseling: I recommended a broad spectrum sunscreen with a SPF of 30 or higher.  I explained that SPF 30 sunscreens block approximately 97 percent of the sun's harmful rays.  Sunscreens should be applied at least 15 minutes prior to expected sun exposure and then every 2 hours after that as long as sun exposure continues. If swimming or exercising sunscreen should be reapplied every 45 minutes to an hour after getting wet or sweating.  One ounce, or the equivalent of a shot glass full of sunscreen, is adequate to protect the skin not covered by a bathing suit. I also recommended a lip balm with a sunscreen as well. Sun protective clothing can be used in lieu of sunscreen but must be worn the entire time you are exposed to the sun's rays.
Detail Level: Detailed

## 2024-02-21 NOTE — TELEPHONE ENCOUNTER
Recommend the medication be increased to 2 tablets twice a day.  
Spoke w/patient and spouse informed of recommendation.verbalized understanding.  
PAST MEDICAL HISTORY:  No pertinent past medical history

## 2024-06-03 NOTE — PROGRESS NOTES
Pt here for picc line d/c. Pt instructed to keep pressure dressing on for 24hrs then to cover site with a band aid until healed . Pt tolerated well and left in NAD   [Ataxic] : not ataxic [de-identified] : Examination of the cervical spine reveals no midline or paraspinal tenderness to palpation. No cervical lymphadenopathy. Decreased range of motion with respect to flexion, extension, rotation, and lateral bending.  Positive Spurlings. Negative Lhermitte's. Full range of motion bilateral shoulders without evidence of impingement. No instability of bilateral upper extremities.  Cranial nerves II through XII grossly intact. Intact sensation bilateral upper extremities.  Grossly preserved strength with the exception of 4 out of 5 right tricep and wrist flexion.  Symmetric reflexes with the exception of an absent triceps reflex on the right. Negative Simpson's. 2+ radial pulse. Negative Tinel's over the cubital and carpal tunnel. No skin lesions on the right and left upper extremities. [de-identified] : Review of his cervical spine MRI does reveal multilevel degenerative foraminal stenosis.  He does have a right-sided C6-7 disc extrusion with foraminal stenosis at that level

## 2024-08-28 NOTE — TELEPHONE ENCOUNTER
Left message stating patient missed their 3:00 PM appointment with Dr. Tobias today and needs to call back to reschedule.      (V5) oriented

## 2025-06-24 NOTE — ASSESSMENT & PLAN NOTE
- weight 330 # currently (150 kg)     ----- Message from Satnam Murphy MD sent at 6/20/2025  3:44 PM CDT -----  The pathology results demonstrated Hyperplastic.

## 2025-07-15 NOTE — TELEPHONE ENCOUNTER
----- Message from Bianca Kumar MD sent at 6/24/2020  3:50 PM CDT -----  Regarding: FW: Pt  Ok. He needs to discuss his labs with the doctor that ordered them, dr. Ghotra. JAM  ----- Message -----  From: Radha Klein LPN  Sent: 6/24/2020   3:25 PM CDT  To: Bianca Kumar MD  Subject: FW: Pt                                           Patient is having pain in the area of his cancer and wanted you to know.  Says this is not new.  Also said his lab work on [facility] is really out of range and is very concerned and wanted to talk to you about it.    ----- Message -----  From: Jenna Turcios  Sent: 6/24/2020   3:19 PM CDT  To: [facility] Staff  Subject: Pt                                               Reason: Pt calling to speak with nurse regarding a few questions he has.. Please call       Communication: [phone]         patient

## (undated) DEVICE — CORD FOR BIPOLAR FORCEPS 12

## (undated) DEVICE — SUT 4-0 ETHILON 18 PS-2

## (undated) DEVICE — PAD CAST 2 IN X 4YDS STERILE

## (undated) DEVICE — ELECTRODE BLADE INSULATED 1 IN

## (undated) DEVICE — GUIDEWIRE RUNTHROUGH EF 180CM

## (undated) DEVICE — ELECTRODE REM PLYHSV RETURN 9

## (undated) DEVICE — APPLICATOR STERILE 3IN

## (undated) DEVICE — DRESSING EYE OVAL LF

## (undated) DEVICE — BLANKET LOWER BODY 55.9X40.2IN

## (undated) DEVICE — SUT VICRYL PLUS 3-0 SH 18IN

## (undated) DEVICE — VALVE CONTROL COPILOT

## (undated) DEVICE — UNDERGLOVES BIOGEL PI SIZE 8

## (undated) DEVICE — PAD EYE OVAL CNTOUR 1.62X2.62

## (undated) DEVICE — DRESSING TELFA N ADH 3X8

## (undated) DEVICE — SYR 50ML CATH TIP

## (undated) DEVICE — DRESSING MEPILEX BORDER 4 X 4

## (undated) DEVICE — APPLIER CLIP LIAGCLIP 9.375IN

## (undated) DEVICE — SUPPORT ULNA NERVE PROTECTOR

## (undated) DEVICE — GLOVE BIOGEL PI MICRO SZ 6

## (undated) DEVICE — SYR 10CC LUER LOCK

## (undated) DEVICE — KIT MANIFOLD LOW PRESS TUBING

## (undated) DEVICE — SOL WATER STRL IRR 1000ML

## (undated) DEVICE — WIRE GUIDE SAFE-T-J .035 260CM

## (undated) DEVICE — DRAPE INCISE IOBAN 2 23X23IN

## (undated) DEVICE — SOL BSS BALANCED SALT

## (undated) DEVICE — TOWEL OR DISP STRL BLUE 4/PK

## (undated) DEVICE — COVER OVERHEAD SURG LT BLUE

## (undated) DEVICE — STOCKINETTE DBL PLY ST 4X

## (undated) DEVICE — CATH EAGLE EYE PLATINUM

## (undated) DEVICE — BANDAGE ACE NON LATEX 2IN

## (undated) DEVICE — SHEET DRAPE FAN-FOLDED 3/4

## (undated) DEVICE — GLOVE BIOGEL 7.5

## (undated) DEVICE — DRAPE PLASTIC U 60X72

## (undated) DEVICE — GAUZE SPONGE 4X4 12PLY

## (undated) DEVICE — SUCTION SURGICAL STR 7FR

## (undated) DEVICE — GUIDE LAUNCHER 6FR JL 4.0

## (undated) DEVICE — TAPE SURG TRANSPORE 1 SNG USE

## (undated) DEVICE — SEE MEDLINE ITEM 157194

## (undated) DEVICE — BLLN NC EUPHORA 4X8MM

## (undated) DEVICE — INTRODUCER CATH 6F 11CM

## (undated) DEVICE — GOWN SMARTGOWN LVL4 X-LONG XL

## (undated) DEVICE — SEE MEDLINE ITEM 157110

## (undated) DEVICE — SEE MEDLINE ITEM 152764

## (undated) DEVICE — KIT GLIDESHEATH SLEND 6FR 10CM

## (undated) DEVICE — KIT HAND CONTROL HIGH PRESSUR

## (undated) DEVICE — PAD DEFIB CADENCE ADULT R2

## (undated) DEVICE — SKINMARKER & RULER REGULAR X-F

## (undated) DEVICE — SEE MEDLINE ITEM 156946

## (undated) DEVICE — SUT 4-0 12-18IN SILK BLACK

## (undated) DEVICE — PACK CATH LAB

## (undated) DEVICE — SHEET EENT SPLIT

## (undated) DEVICE — TRAY CYSTO BASIN

## (undated) DEVICE — HEMOSTAT VASC BAND LONG 27CM

## (undated) DEVICE — CATH DXTERITY JR40 100CM 6FR

## (undated) DEVICE — TUBING SUC UNIV W/CONN 12FT

## (undated) DEVICE — DRAPE STERI-DRAPE 1000 17X11IN

## (undated) DEVICE — SOL 9P NACL IRR PIC IL

## (undated) DEVICE — APPLIER LIGACLIP SM 9.38IN

## (undated) DEVICE — POSITIONER HEAD DONUT 9IN FOAM

## (undated) DEVICE — CONTRAST VISIPAQUE 150ML

## (undated) DEVICE — Device

## (undated) DEVICE — GLOVE SURGICAL LATEX SZ 6.5

## (undated) DEVICE — CATH DXTERITY NOTO 100CM 6FR

## (undated) DEVICE — SUT 2-0 12-18IN SILK

## (undated) DEVICE — SUT SILK 3-0 SH 18IN BLACK

## (undated) DEVICE — SEE MEDLINE ITEM 154981

## (undated) DEVICE — DROPPER MEDICINE

## (undated) DEVICE — SEE MEDLINE ITEM 152487

## (undated) DEVICE — GUIDEWIRE SAFE T J TIP 145X2.5

## (undated) DEVICE — KIT ANTIFOG

## (undated) DEVICE — SET PROSTATE STABILIZATION 18G

## (undated) DEVICE — PACK CYSTO

## (undated) DEVICE — NDL HYPO A BEVEL 30X1/2

## (undated) DEVICE — CATH DXTERITY JL40 100CM 6FR

## (undated) DEVICE — GUIDE LAUNCHER 6FR EBU 3.5

## (undated) DEVICE — SUT SILK 2-0 SH 18IN BLACK

## (undated) DEVICE — OMNIPAQUE 350MG 150ML VIAL

## (undated) DEVICE — BANDAGE ELASTIC 2X5 VELCRO ST

## (undated) DEVICE — KIT ESSENTIALS W/ Y ADAPTER

## (undated) DEVICE — SYS LABLNG CORECT MED 4 FLG

## (undated) DEVICE — SPONGE LAP 18X18 PREWASHED

## (undated) DEVICE — CORD BIPOLAR 12 FOOT

## (undated) DEVICE — PAD RADI FEMORAL

## (undated) DEVICE — DRESSING AQUACEL AG SILVER 4X4

## (undated) DEVICE — COVER LIGHT HANDLE 80/CA

## (undated) DEVICE — NDL STRAIGHT 4CM LEIBINGER

## (undated) DEVICE — TOURNIQUET SB QC DP 18X4IN

## (undated) DEVICE — DRAPE STERI INSTRUMENT 1018

## (undated) DEVICE — GLOVE PI ULTRA TOUCH G SURGEON

## (undated) DEVICE — NDL 22GA X1 1/2 REG BEVEL

## (undated) DEVICE — ADHESIVE DERMABOND MINI HV

## (undated) DEVICE — CLEANER TIP ELECSURG 2X2IN

## (undated) DEVICE — GLOVE BIOGEL ECLIPSE SZ 7.5

## (undated) DEVICE — PENCIL ROCKER SWITCH 10FT CORD

## (undated) DEVICE — SEE MEDLINE ITEM 152529

## (undated) DEVICE — SEE MEDLINE ITEM 152622

## (undated) DEVICE — SYR 50CC LL

## (undated) DEVICE — PLUG CATHETER STERILE FOLEY

## (undated) DEVICE — CUP MEDICINE STERILE 2OZ

## (undated) DEVICE — CONTAINER SPECIMEN STRL 4OZ

## (undated) DEVICE — INTRODUCER CATH 8F 11CM

## (undated) DEVICE — SOL BETADINE 5%

## (undated) DEVICE — KIT SYR REUSABLE

## (undated) DEVICE — STAPLER SKIN ROTATING HEAD

## (undated) DEVICE — BLLN ENDOCAVITY 2X14CM

## (undated) DEVICE — GOWN SURGICAL X-LARGE

## (undated) DEVICE — INSTRUMENT SURG SUCT FRZR W/C

## (undated) DEVICE — SEE MEDLINE ITEM 146313

## (undated) DEVICE — TOURNIQUET SB QC SP 34X4IN

## (undated) DEVICE — PROTECTOR CORNEAL CROUCH ST

## (undated) DEVICE — TRAY MUSCLE LID EYE

## (undated) DEVICE — FORCEP CURVED DISP

## (undated) DEVICE — DRESSING XEROFORM FOIL PK 1X8

## (undated) DEVICE — DRESSING AQUACEL FOAM 3 X 3

## (undated) DEVICE — INFLATOR ADVANTAGE ENCORE 26

## (undated) DEVICE — COVER TRANSDUCER LATEX N/STERI

## (undated) DEVICE — SUT 3-0 12-18IN SILK

## (undated) DEVICE — BLADE ELECTRO EDGE INSULATED

## (undated) DEVICE — SEE L#120831

## (undated) DEVICE — SPONGE PATTY SURGICAL .5X3IN

## (undated) DEVICE — PACK HEAD & NECK

## (undated) DEVICE — SYR 5CC LUER LOCK W/O NDL

## (undated) DEVICE — SUT MONOCRYL 5-0 P-3 UND 18

## (undated) DEVICE — PADDING CAST 2IN DELTA ROLL

## (undated) DEVICE — SPONGE KITTNER 1/4X 5/8 L STRL

## (undated) DEVICE — BLADE SURG #15 CARBON STEEL

## (undated) DEVICE — DEVICE PERCLOSE SUT CLSR 6FR

## (undated) DEVICE — CANISTER SUCTION 2 LTR

## (undated) DEVICE — CATH DXTERITY PIGSTR 110CM 6FR

## (undated) DEVICE — HOOK LONE STAR BLUNT 12MM

## (undated) DEVICE — CATH NC QUANTUM APEX MR 4X12

## (undated) DEVICE — SPONGE GAUZE 16PLY 4X4

## (undated) DEVICE — SEE MEDLINE ITEM 107746